# Patient Record
Sex: MALE | Race: WHITE | Employment: OTHER | URBAN - METROPOLITAN AREA
[De-identification: names, ages, dates, MRNs, and addresses within clinical notes are randomized per-mention and may not be internally consistent; named-entity substitution may affect disease eponyms.]

---

## 2017-01-18 ENCOUNTER — OFFICE VISIT (OUTPATIENT)
Dept: BARIATRICS/WEIGHT MGMT | Age: 47
End: 2017-01-18

## 2017-01-18 VITALS
HEIGHT: 66 IN | SYSTOLIC BLOOD PRESSURE: 139 MMHG | DIASTOLIC BLOOD PRESSURE: 79 MMHG | HEART RATE: 118 BPM | OXYGEN SATURATION: 97 % | BODY MASS INDEX: 32.14 KG/M2 | WEIGHT: 199.96 LBS

## 2017-01-18 DIAGNOSIS — L72.3 INFECTED SEBACEOUS CYST OF SKIN: ICD-10-CM

## 2017-01-18 DIAGNOSIS — Z89.511 STATUS POST BELOW KNEE AMPUTATION OF RIGHT LOWER EXTREMITY (HCC): Primary | ICD-10-CM

## 2017-01-18 DIAGNOSIS — L08.9 INFECTED SEBACEOUS CYST OF SKIN: ICD-10-CM

## 2017-01-18 PROCEDURE — 99213 OFFICE O/P EST LOW 20 MIN: CPT | Performed by: SURGERY

## 2017-01-18 RX ORDER — SULFAMETHOXAZOLE AND TRIMETHOPRIM 800; 160 MG/1; MG/1
1 TABLET ORAL 2 TIMES DAILY
Qty: 14 TABLET | Refills: 0 | Status: SHIPPED | OUTPATIENT
Start: 2017-01-18 | End: 2017-01-25

## 2017-01-18 RX ORDER — IBUPROFEN 200 MG
TABLET ORAL
Qty: 1 TUBE | Refills: 2 | Status: ON HOLD | OUTPATIENT
Start: 2017-01-18 | End: 2017-07-22 | Stop reason: ALTCHOICE

## 2017-01-24 ASSESSMENT — ENCOUNTER SYMPTOMS
WHEEZING: 0
TROUBLE SWALLOWING: 0
DIARRHEA: 0
SHORTNESS OF BREATH: 0
ABDOMINAL PAIN: 0
COLOR CHANGE: 0
BLOOD IN STOOL: 0
CONSTIPATION: 0
NAUSEA: 0
SORE THROAT: 0
PHOTOPHOBIA: 0
ANAL BLEEDING: 0
VOICE CHANGE: 0
COUGH: 0
VOMITING: 0

## 2017-02-01 ENCOUNTER — OFFICE VISIT (OUTPATIENT)
Dept: BARIATRICS/WEIGHT MGMT | Age: 47
End: 2017-02-01

## 2017-02-01 VITALS — RESPIRATION RATE: 16 BRPM | SYSTOLIC BLOOD PRESSURE: 118 MMHG | DIASTOLIC BLOOD PRESSURE: 72 MMHG | HEART RATE: 68 BPM

## 2017-02-01 DIAGNOSIS — Z89.511 STATUS POST BELOW KNEE AMPUTATION OF RIGHT LOWER EXTREMITY (HCC): Primary | ICD-10-CM

## 2017-02-01 DIAGNOSIS — L72.3 SEBACEOUS CYST: ICD-10-CM

## 2017-02-01 PROCEDURE — 99214 OFFICE O/P EST MOD 30 MIN: CPT | Performed by: SURGERY

## 2017-02-06 ENCOUNTER — TELEPHONE (OUTPATIENT)
Dept: BARIATRICS/WEIGHT MGMT | Age: 47
End: 2017-02-06

## 2017-02-06 ASSESSMENT — ENCOUNTER SYMPTOMS
COLOR CHANGE: 0
WHEEZING: 0
VOMITING: 0
SORE THROAT: 0
CONSTIPATION: 0
ABDOMINAL PAIN: 0
ANAL BLEEDING: 0
DIARRHEA: 0
VOICE CHANGE: 0
COUGH: 0
SHORTNESS OF BREATH: 0
TROUBLE SWALLOWING: 0
BLOOD IN STOOL: 0
PHOTOPHOBIA: 0
NAUSEA: 0

## 2017-02-22 ENCOUNTER — OFFICE VISIT (OUTPATIENT)
Dept: BARIATRICS/WEIGHT MGMT | Age: 47
End: 2017-02-22

## 2017-02-22 VITALS
DIASTOLIC BLOOD PRESSURE: 66 MMHG | SYSTOLIC BLOOD PRESSURE: 108 MMHG | BODY MASS INDEX: 35.36 KG/M2 | HEART RATE: 90 BPM | WEIGHT: 220.02 LBS | HEIGHT: 66 IN

## 2017-02-22 DIAGNOSIS — L72.3 INFECTED SEBACEOUS CYST: Primary | ICD-10-CM

## 2017-02-22 DIAGNOSIS — L08.9 INFECTED SEBACEOUS CYST: Primary | ICD-10-CM

## 2017-02-22 PROCEDURE — 99213 OFFICE O/P EST LOW 20 MIN: CPT | Performed by: SURGERY

## 2017-02-23 ENCOUNTER — OFFICE VISIT (OUTPATIENT)
Dept: BARIATRICS/WEIGHT MGMT | Age: 47
End: 2017-02-23

## 2017-02-23 VITALS
TEMPERATURE: 98.5 F | DIASTOLIC BLOOD PRESSURE: 76 MMHG | SYSTOLIC BLOOD PRESSURE: 137 MMHG | HEART RATE: 109 BPM | BODY MASS INDEX: 35.36 KG/M2 | RESPIRATION RATE: 16 BRPM | WEIGHT: 220 LBS

## 2017-02-23 DIAGNOSIS — L03.811 CELLULITIS OF HEAD EXCEPT FACE: Primary | ICD-10-CM

## 2017-02-23 DIAGNOSIS — L72.3 SEBACEOUS CYST: ICD-10-CM

## 2017-02-23 PROCEDURE — 99024 POSTOP FOLLOW-UP VISIT: CPT | Performed by: SURGERY

## 2017-02-23 RX ORDER — DOXYCYCLINE HYCLATE 100 MG/1
100 CAPSULE ORAL 2 TIMES DAILY
Qty: 20 CAPSULE | Refills: 0 | Status: SHIPPED | OUTPATIENT
Start: 2017-02-23 | End: 2017-03-01 | Stop reason: SDUPTHER

## 2017-03-01 ENCOUNTER — OFFICE VISIT (OUTPATIENT)
Dept: BARIATRICS/WEIGHT MGMT | Age: 47
End: 2017-03-01

## 2017-03-01 VITALS
HEART RATE: 90 BPM | DIASTOLIC BLOOD PRESSURE: 78 MMHG | HEIGHT: 66 IN | BODY MASS INDEX: 35.36 KG/M2 | WEIGHT: 220.02 LBS | SYSTOLIC BLOOD PRESSURE: 130 MMHG

## 2017-03-01 DIAGNOSIS — L72.3 SEBACEOUS CYST: Primary | ICD-10-CM

## 2017-03-01 DIAGNOSIS — L03.811 CELLULITIS OF HEAD EXCEPT FACE: ICD-10-CM

## 2017-03-01 PROCEDURE — 99213 OFFICE O/P EST LOW 20 MIN: CPT | Performed by: SURGERY

## 2017-03-01 RX ORDER — DOXYCYCLINE HYCLATE 100 MG/1
100 CAPSULE ORAL 2 TIMES DAILY
Qty: 20 CAPSULE | Refills: 0 | Status: SHIPPED | OUTPATIENT
Start: 2017-03-01 | End: 2017-03-11

## 2017-03-09 ENCOUNTER — HOSPITAL ENCOUNTER (OUTPATIENT)
Dept: PHYSICAL THERAPY | Age: 47
Discharge: HOME | End: 2017-03-31
Attending: SURGERY | Admitting: SURGERY

## 2017-03-09 DIAGNOSIS — Z89.511 STATUS POST BELOW KNEE AMPUTATION OF RIGHT LOWER EXTREMITY (HCC): ICD-10-CM

## 2017-03-09 DIAGNOSIS — R26.9 GAIT DISTURBANCE: Primary | ICD-10-CM

## 2017-03-13 ENCOUNTER — TELEPHONE (OUTPATIENT)
Dept: BARIATRICS/WEIGHT MGMT | Age: 47
End: 2017-03-13

## 2017-03-15 ENCOUNTER — OFFICE VISIT (OUTPATIENT)
Dept: BARIATRICS/WEIGHT MGMT | Age: 47
End: 2017-03-15

## 2017-03-15 VITALS
SYSTOLIC BLOOD PRESSURE: 124 MMHG | HEART RATE: 80 BPM | BODY MASS INDEX: 35.36 KG/M2 | HEIGHT: 66 IN | WEIGHT: 220.02 LBS | DIASTOLIC BLOOD PRESSURE: 76 MMHG

## 2017-03-15 DIAGNOSIS — L72.3 SEBACEOUS CYST: ICD-10-CM

## 2017-03-15 DIAGNOSIS — E11.42 TYPE 2 DIABETES MELLITUS WITH DIABETIC POLYNEUROPATHY, WITH LONG-TERM CURRENT USE OF INSULIN (HCC): Primary | ICD-10-CM

## 2017-03-15 DIAGNOSIS — Z89.511 STATUS POST BELOW KNEE AMPUTATION OF RIGHT LOWER EXTREMITY (HCC): ICD-10-CM

## 2017-03-15 DIAGNOSIS — L97.519 DIABETIC ULCER OF RIGHT FOOT ASSOCIATED WITH TYPE 2 DIABETES MELLITUS (HCC): ICD-10-CM

## 2017-03-15 DIAGNOSIS — E11.621 DIABETIC ULCER OF RIGHT FOOT ASSOCIATED WITH TYPE 2 DIABETES MELLITUS (HCC): ICD-10-CM

## 2017-03-15 DIAGNOSIS — Z79.4 TYPE 2 DIABETES MELLITUS WITH DIABETIC POLYNEUROPATHY, WITH LONG-TERM CURRENT USE OF INSULIN (HCC): Primary | ICD-10-CM

## 2017-03-15 PROCEDURE — 99213 OFFICE O/P EST LOW 20 MIN: CPT | Performed by: SURGERY

## 2017-04-01 ENCOUNTER — HOSPITAL ENCOUNTER (OUTPATIENT)
Dept: OTHER | Age: 47
Discharge: HOME | End: 2017-04-30
Attending: SURGERY | Admitting: SURGERY

## 2017-07-22 ENCOUNTER — HOSPITAL ENCOUNTER (INPATIENT)
Age: 47
LOS: 16 days | Discharge: HOME | DRG: 885 | End: 2017-08-07
Attending: PSYCHIATRY & NEUROLOGY | Admitting: PSYCHIATRY & NEUROLOGY
Payer: MEDICARE

## 2017-07-22 LAB — GLUCOSE BLD-MCNC: 286 MG/DL (ref 70–108)

## 2017-07-22 PROCEDURE — 6370000000 HC RX 637 (ALT 250 FOR IP): Performed by: PSYCHIATRY & NEUROLOGY

## 2017-07-22 PROCEDURE — 82948 REAGENT STRIP/BLOOD GLUCOSE: CPT

## 2017-07-22 PROCEDURE — 6370000000 HC RX 637 (ALT 250 FOR IP)

## 2017-07-22 PROCEDURE — 1240000000 HC EMOTIONAL WELLNESS R&B

## 2017-07-22 RX ORDER — SERTRALINE HYDROCHLORIDE 100 MG/1
100 TABLET, FILM COATED ORAL DAILY
Status: DISCONTINUED | OUTPATIENT
Start: 2017-07-23 | End: 2017-08-04

## 2017-07-22 RX ORDER — HYDROXYZINE PAMOATE 25 MG/1
25 CAPSULE ORAL 3 TIMES DAILY PRN
Status: DISCONTINUED | OUTPATIENT
Start: 2017-07-22 | End: 2017-08-07 | Stop reason: HOSPADM

## 2017-07-22 RX ORDER — DOXYCYCLINE HYCLATE 100 MG
100 TABLET ORAL EVERY 12 HOURS SCHEDULED
Status: COMPLETED | OUTPATIENT
Start: 2017-07-22 | End: 2017-07-27

## 2017-07-22 RX ORDER — TRAZODONE HYDROCHLORIDE 50 MG/1
TABLET ORAL
Status: COMPLETED
Start: 2017-07-22 | End: 2017-07-22

## 2017-07-22 RX ORDER — METHOCARBAMOL 500 MG/1
750 TABLET, FILM COATED ORAL 4 TIMES DAILY
Status: DISCONTINUED | OUTPATIENT
Start: 2017-07-22 | End: 2017-08-07 | Stop reason: HOSPADM

## 2017-07-22 RX ORDER — DEXTROSE MONOHYDRATE 50 MG/ML
100 INJECTION, SOLUTION INTRAVENOUS PRN
Status: DISCONTINUED | OUTPATIENT
Start: 2017-07-22 | End: 2017-08-07 | Stop reason: HOSPADM

## 2017-07-22 RX ORDER — ACETAMINOPHEN 325 MG/1
650 TABLET ORAL EVERY 4 HOURS PRN
Status: DISCONTINUED | OUTPATIENT
Start: 2017-07-22 | End: 2017-08-07 | Stop reason: HOSPADM

## 2017-07-22 RX ORDER — MAGNESIUM HYDROXIDE/ALUMINUM HYDROXICE/SIMETHICONE 120; 1200; 1200 MG/30ML; MG/30ML; MG/30ML
30 SUSPENSION ORAL PRN
Status: DISCONTINUED | OUTPATIENT
Start: 2017-07-22 | End: 2017-08-07 | Stop reason: HOSPADM

## 2017-07-22 RX ORDER — NICOTINE POLACRILEX 4 MG
15 LOZENGE BUCCAL PRN
Status: DISCONTINUED | OUTPATIENT
Start: 2017-07-22 | End: 2017-08-07 | Stop reason: HOSPADM

## 2017-07-22 RX ORDER — DEXTROSE MONOHYDRATE 25 G/50ML
12.5 INJECTION, SOLUTION INTRAVENOUS PRN
Status: DISCONTINUED | OUTPATIENT
Start: 2017-07-22 | End: 2017-08-07 | Stop reason: HOSPADM

## 2017-07-22 RX ORDER — OXYCODONE HYDROCHLORIDE AND ACETAMINOPHEN 5; 325 MG/1; MG/1
1 TABLET ORAL EVERY 4 HOURS PRN
Status: ON HOLD | COMMUNITY
End: 2017-08-18 | Stop reason: HOSPADM

## 2017-07-22 RX ORDER — TRAZODONE HYDROCHLORIDE 50 MG/1
50 TABLET ORAL ONCE
Status: COMPLETED | OUTPATIENT
Start: 2017-07-23 | End: 2017-07-22

## 2017-07-22 RX ORDER — METHOCARBAMOL 750 MG/1
750 TABLET, FILM COATED ORAL 4 TIMES DAILY
Status: ON HOLD | COMMUNITY
End: 2017-09-01 | Stop reason: HOSPADM

## 2017-07-22 RX ORDER — TRAZODONE HYDROCHLORIDE 50 MG/1
50 TABLET ORAL NIGHTLY PRN
Status: DISCONTINUED | OUTPATIENT
Start: 2017-07-23 | End: 2017-07-31

## 2017-07-22 RX ORDER — OXYCODONE HYDROCHLORIDE AND ACETAMINOPHEN 5; 325 MG/1; MG/1
1 TABLET ORAL EVERY 4 HOURS PRN
Status: DISCONTINUED | OUTPATIENT
Start: 2017-07-22 | End: 2017-07-22 | Stop reason: SDUPTHER

## 2017-07-22 RX ORDER — ARIPIPRAZOLE 5 MG/1
5 TABLET ORAL DAILY
Status: DISCONTINUED | OUTPATIENT
Start: 2017-07-23 | End: 2017-07-26

## 2017-07-22 RX ORDER — OXYCODONE HYDROCHLORIDE AND ACETAMINOPHEN 5; 325 MG/1; MG/1
2 TABLET ORAL EVERY 4 HOURS PRN
Status: DISCONTINUED | OUTPATIENT
Start: 2017-07-22 | End: 2017-08-07 | Stop reason: HOSPADM

## 2017-07-22 RX ORDER — INSULIN GLARGINE 100 [IU]/ML
15 INJECTION, SOLUTION SUBCUTANEOUS NIGHTLY
Status: DISCONTINUED | OUTPATIENT
Start: 2017-07-22 | End: 2017-08-07 | Stop reason: HOSPADM

## 2017-07-22 RX ORDER — OXYCODONE HYDROCHLORIDE AND ACETAMINOPHEN 5; 325 MG/1; MG/1
1 TABLET ORAL EVERY 4 HOURS PRN
Status: DISCONTINUED | OUTPATIENT
Start: 2017-07-22 | End: 2017-08-07 | Stop reason: HOSPADM

## 2017-07-22 RX ADMIN — OXYCODONE HYDROCHLORIDE AND ACETAMINOPHEN 2 TABLET: 5; 325 TABLET ORAL at 22:38

## 2017-07-22 RX ADMIN — TRAZODONE HYDROCHLORIDE 50 MG: 50 TABLET ORAL at 23:45

## 2017-07-22 RX ADMIN — METHOCARBAMOL 750 MG: 500 TABLET ORAL at 22:35

## 2017-07-22 RX ADMIN — INSULIN GLARGINE 15 UNITS: 100 INJECTION, SOLUTION SUBCUTANEOUS at 22:33

## 2017-07-22 RX ADMIN — DOXYCYCLINE HYCLATE 100 MG: 100 TABLET, COATED ORAL at 22:36

## 2017-07-22 RX ADMIN — Medication 2 UNITS: at 22:33

## 2017-07-22 ASSESSMENT — SLEEP AND FATIGUE QUESTIONNAIRES
AVERAGE NUMBER OF SLEEP HOURS: 4
SLEEP PATTERN: DIFFICULTY FALLING ASLEEP;DISTURBED/INTERRUPTED SLEEP
DO YOU USE A SLEEP AID: NO
RESTFUL SLEEP: NO
DIFFICULTY FALLING ASLEEP: YES
DO YOU HAVE DIFFICULTY SLEEPING: YES
DIFFICULTY ARISING: YES
DIFFICULTY STAYING ASLEEP: YES

## 2017-07-22 ASSESSMENT — PAIN DESCRIPTION - DESCRIPTORS: DESCRIPTORS: ACHING;CONSTANT;DISCOMFORT

## 2017-07-22 ASSESSMENT — PAIN DESCRIPTION - FREQUENCY: FREQUENCY: CONTINUOUS

## 2017-07-22 ASSESSMENT — PATIENT HEALTH QUESTIONNAIRE - PHQ9: SUM OF ALL RESPONSES TO PHQ QUESTIONS 1-9: 18

## 2017-07-22 ASSESSMENT — PAIN SCALES - GENERAL
PAINLEVEL_OUTOF10: 10
PAINLEVEL_OUTOF10: 5

## 2017-07-22 ASSESSMENT — PAIN DESCRIPTION - LOCATION: LOCATION: SHOULDER

## 2017-07-22 ASSESSMENT — PAIN DESCRIPTION - ORIENTATION: ORIENTATION: LEFT

## 2017-07-22 ASSESSMENT — PAIN DESCRIPTION - ONSET: ONSET: ON-GOING

## 2017-07-22 ASSESSMENT — PAIN DESCRIPTION - PAIN TYPE: TYPE: ACUTE PAIN

## 2017-07-22 ASSESSMENT — PAIN DESCRIPTION - PROGRESSION: CLINICAL_PROGRESSION: NOT CHANGED

## 2017-07-22 ASSESSMENT — LIFESTYLE VARIABLES: HISTORY_ALCOHOL_USE: NO

## 2017-07-23 LAB
GLUCOSE BLD-MCNC: 158 MG/DL (ref 70–108)
GLUCOSE BLD-MCNC: 205 MG/DL (ref 70–108)
GLUCOSE BLD-MCNC: 276 MG/DL (ref 70–108)
GLUCOSE BLD-MCNC: 284 MG/DL (ref 70–108)

## 2017-07-23 PROCEDURE — 82948 REAGENT STRIP/BLOOD GLUCOSE: CPT

## 2017-07-23 PROCEDURE — 6370000000 HC RX 637 (ALT 250 FOR IP): Performed by: PSYCHIATRY & NEUROLOGY

## 2017-07-23 PROCEDURE — 1240000000 HC EMOTIONAL WELLNESS R&B

## 2017-07-23 PROCEDURE — 90792 PSYCH DIAG EVAL W/MED SRVCS: CPT | Performed by: PSYCHIATRY & NEUROLOGY

## 2017-07-23 RX ADMIN — METHOCARBAMOL 750 MG: 500 TABLET ORAL at 17:12

## 2017-07-23 RX ADMIN — INSULIN GLARGINE 15 UNITS: 100 INJECTION, SOLUTION SUBCUTANEOUS at 21:15

## 2017-07-23 RX ADMIN — METHOCARBAMOL 750 MG: 500 TABLET ORAL at 08:22

## 2017-07-23 RX ADMIN — OXYCODONE HYDROCHLORIDE AND ACETAMINOPHEN 2 TABLET: 5; 325 TABLET ORAL at 21:24

## 2017-07-23 RX ADMIN — SERTRALINE 100 MG: 100 TABLET, FILM COATED ORAL at 08:23

## 2017-07-23 RX ADMIN — INSULIN LISPRO 3 UNITS: 100 INJECTION, SOLUTION INTRAVENOUS; SUBCUTANEOUS at 17:09

## 2017-07-23 RX ADMIN — METHOCARBAMOL 750 MG: 500 TABLET ORAL at 21:13

## 2017-07-23 RX ADMIN — OXYCODONE HYDROCHLORIDE AND ACETAMINOPHEN 2 TABLET: 5; 325 TABLET ORAL at 08:29

## 2017-07-23 RX ADMIN — INSULIN LISPRO 1 UNITS: 100 INJECTION, SOLUTION INTRAVENOUS; SUBCUTANEOUS at 08:20

## 2017-07-23 RX ADMIN — DOXYCYCLINE HYCLATE 100 MG: 100 TABLET, COATED ORAL at 21:14

## 2017-07-23 RX ADMIN — ARIPIPRAZOLE 5 MG: 5 TABLET ORAL at 08:23

## 2017-07-23 RX ADMIN — INSULIN LISPRO 2 UNITS: 100 INJECTION, SOLUTION INTRAVENOUS; SUBCUTANEOUS at 12:21

## 2017-07-23 RX ADMIN — METHOCARBAMOL 750 MG: 500 TABLET ORAL at 12:23

## 2017-07-23 RX ADMIN — DOXYCYCLINE HYCLATE 100 MG: 100 TABLET, COATED ORAL at 08:23

## 2017-07-23 RX ADMIN — Medication 2 UNITS: at 21:15

## 2017-07-23 ASSESSMENT — PAIN DESCRIPTION - LOCATION: LOCATION: SHOULDER

## 2017-07-23 ASSESSMENT — PAIN SCALES - GENERAL
PAINLEVEL_OUTOF10: 9
PAINLEVEL_OUTOF10: 3
PAINLEVEL_OUTOF10: 10
PAINLEVEL_OUTOF10: 9

## 2017-07-23 ASSESSMENT — SLEEP AND FATIGUE QUESTIONNAIRES
AVERAGE NUMBER OF SLEEP HOURS: 4
DIFFICULTY STAYING ASLEEP: YES
DO YOU HAVE DIFFICULTY SLEEPING: YES
DO YOU USE A SLEEP AID: NO
RESTFUL SLEEP: NO
DIFFICULTY FALLING ASLEEP: YES
SLEEP PATTERN: DIFFICULTY FALLING ASLEEP;DISTURBED/INTERRUPTED SLEEP
DIFFICULTY ARISING: YES

## 2017-07-23 ASSESSMENT — PAIN DESCRIPTION - DESCRIPTORS: DESCRIPTORS: ACHING;CONSTANT;DISCOMFORT

## 2017-07-23 ASSESSMENT — PAIN DESCRIPTION - FREQUENCY: FREQUENCY: CONTINUOUS

## 2017-07-23 ASSESSMENT — PAIN DESCRIPTION - ORIENTATION: ORIENTATION: LEFT

## 2017-07-23 ASSESSMENT — PAIN DESCRIPTION - PAIN TYPE: TYPE: ACUTE PAIN

## 2017-07-24 LAB
GLUCOSE BLD-MCNC: 205 MG/DL (ref 70–108)
GLUCOSE BLD-MCNC: 271 MG/DL (ref 70–108)
GLUCOSE BLD-MCNC: 305 MG/DL (ref 70–108)
GLUCOSE BLD-MCNC: 327 MG/DL (ref 70–108)

## 2017-07-24 PROCEDURE — 99231 SBSQ HOSP IP/OBS SF/LOW 25: CPT | Performed by: PHYSICIAN ASSISTANT

## 2017-07-24 PROCEDURE — 82948 REAGENT STRIP/BLOOD GLUCOSE: CPT

## 2017-07-24 PROCEDURE — 6370000000 HC RX 637 (ALT 250 FOR IP): Performed by: PSYCHIATRY & NEUROLOGY

## 2017-07-24 PROCEDURE — 1240000000 HC EMOTIONAL WELLNESS R&B

## 2017-07-24 RX ADMIN — METHOCARBAMOL 750 MG: 500 TABLET ORAL at 18:18

## 2017-07-24 RX ADMIN — SERTRALINE 100 MG: 100 TABLET, FILM COATED ORAL at 08:57

## 2017-07-24 RX ADMIN — METHOCARBAMOL 750 MG: 500 TABLET ORAL at 20:23

## 2017-07-24 RX ADMIN — METHOCARBAMOL 750 MG: 500 TABLET ORAL at 08:57

## 2017-07-24 RX ADMIN — METHOCARBAMOL 750 MG: 500 TABLET ORAL at 12:42

## 2017-07-24 RX ADMIN — INSULIN LISPRO 3 UNITS: 100 INJECTION, SOLUTION INTRAVENOUS; SUBCUTANEOUS at 18:10

## 2017-07-24 RX ADMIN — OXYCODONE HYDROCHLORIDE AND ACETAMINOPHEN 2 TABLET: 5; 325 TABLET ORAL at 14:39

## 2017-07-24 RX ADMIN — INSULIN LISPRO 2 UNITS: 100 INJECTION, SOLUTION INTRAVENOUS; SUBCUTANEOUS at 08:57

## 2017-07-24 RX ADMIN — DOXYCYCLINE HYCLATE 100 MG: 100 TABLET, COATED ORAL at 20:26

## 2017-07-24 RX ADMIN — Medication 2 UNITS: at 20:27

## 2017-07-24 RX ADMIN — ARIPIPRAZOLE 5 MG: 5 TABLET ORAL at 08:57

## 2017-07-24 RX ADMIN — DOXYCYCLINE HYCLATE 100 MG: 100 TABLET, COATED ORAL at 08:57

## 2017-07-24 RX ADMIN — OXYCODONE HYDROCHLORIDE AND ACETAMINOPHEN 2 TABLET: 5; 325 TABLET ORAL at 09:01

## 2017-07-24 RX ADMIN — INSULIN LISPRO 4 UNITS: 100 INJECTION, SOLUTION INTRAVENOUS; SUBCUTANEOUS at 12:42

## 2017-07-24 RX ADMIN — OXYCODONE HYDROCHLORIDE AND ACETAMINOPHEN 2 TABLET: 5; 325 TABLET ORAL at 20:32

## 2017-07-24 RX ADMIN — TRAZODONE HYDROCHLORIDE 50 MG: 50 TABLET ORAL at 20:32

## 2017-07-24 RX ADMIN — INSULIN GLARGINE 15 UNITS: 100 INJECTION, SOLUTION SUBCUTANEOUS at 20:26

## 2017-07-24 ASSESSMENT — PAIN DESCRIPTION - ORIENTATION: ORIENTATION: LEFT

## 2017-07-24 ASSESSMENT — PAIN SCALES - GENERAL
PAINLEVEL_OUTOF10: 9
PAINLEVEL_OUTOF10: 7
PAINLEVEL_OUTOF10: 7
PAINLEVEL_OUTOF10: 6
PAINLEVEL_OUTOF10: 7

## 2017-07-24 ASSESSMENT — PAIN DESCRIPTION - PAIN TYPE: TYPE: SURGICAL PAIN

## 2017-07-24 ASSESSMENT — PAIN DESCRIPTION - LOCATION: LOCATION: SHOULDER

## 2017-07-25 LAB
GLUCOSE BLD-MCNC: 207 MG/DL (ref 70–108)
GLUCOSE BLD-MCNC: 246 MG/DL (ref 70–108)
GLUCOSE BLD-MCNC: 334 MG/DL (ref 70–108)
GLUCOSE BLD-MCNC: 345 MG/DL (ref 70–108)

## 2017-07-25 PROCEDURE — 1240000000 HC EMOTIONAL WELLNESS R&B

## 2017-07-25 PROCEDURE — 6370000000 HC RX 637 (ALT 250 FOR IP): Performed by: PSYCHIATRY & NEUROLOGY

## 2017-07-25 PROCEDURE — 82948 REAGENT STRIP/BLOOD GLUCOSE: CPT

## 2017-07-25 PROCEDURE — 99231 SBSQ HOSP IP/OBS SF/LOW 25: CPT | Performed by: NURSE PRACTITIONER

## 2017-07-25 RX ADMIN — SERTRALINE 100 MG: 100 TABLET, FILM COATED ORAL at 08:42

## 2017-07-25 RX ADMIN — METHOCARBAMOL 750 MG: 500 TABLET ORAL at 17:11

## 2017-07-25 RX ADMIN — OXYCODONE HYDROCHLORIDE AND ACETAMINOPHEN 2 TABLET: 5; 325 TABLET ORAL at 08:45

## 2017-07-25 RX ADMIN — METHOCARBAMOL 750 MG: 500 TABLET ORAL at 22:00

## 2017-07-25 RX ADMIN — INSULIN GLARGINE 15 UNITS: 100 INJECTION, SOLUTION SUBCUTANEOUS at 21:59

## 2017-07-25 RX ADMIN — INSULIN LISPRO 2 UNITS: 100 INJECTION, SOLUTION INTRAVENOUS; SUBCUTANEOUS at 08:00

## 2017-07-25 RX ADMIN — INSULIN LISPRO 2 UNITS: 100 INJECTION, SOLUTION INTRAVENOUS; SUBCUTANEOUS at 12:10

## 2017-07-25 RX ADMIN — OXYCODONE HYDROCHLORIDE AND ACETAMINOPHEN 2 TABLET: 5; 325 TABLET ORAL at 22:04

## 2017-07-25 RX ADMIN — ARIPIPRAZOLE 5 MG: 5 TABLET ORAL at 08:42

## 2017-07-25 RX ADMIN — Medication 2 UNITS: at 21:58

## 2017-07-25 RX ADMIN — DOXYCYCLINE HYCLATE 100 MG: 100 TABLET, COATED ORAL at 08:42

## 2017-07-25 RX ADMIN — TRAZODONE HYDROCHLORIDE 50 MG: 50 TABLET ORAL at 22:01

## 2017-07-25 RX ADMIN — METHOCARBAMOL 750 MG: 500 TABLET ORAL at 12:12

## 2017-07-25 RX ADMIN — INSULIN LISPRO 4 UNITS: 100 INJECTION, SOLUTION INTRAVENOUS; SUBCUTANEOUS at 17:11

## 2017-07-25 RX ADMIN — METHOCARBAMOL 750 MG: 500 TABLET ORAL at 08:42

## 2017-07-25 RX ADMIN — DOXYCYCLINE HYCLATE 100 MG: 100 TABLET, COATED ORAL at 22:00

## 2017-07-25 ASSESSMENT — PAIN SCALES - GENERAL
PAINLEVEL_OUTOF10: 9
PAINLEVEL_OUTOF10: 3
PAINLEVEL_OUTOF10: 9
PAINLEVEL_OUTOF10: 9

## 2017-07-25 ASSESSMENT — PAIN DESCRIPTION - PAIN TYPE: TYPE: ACUTE PAIN

## 2017-07-25 ASSESSMENT — PAIN DESCRIPTION - FREQUENCY: FREQUENCY: CONTINUOUS

## 2017-07-25 ASSESSMENT — PAIN DESCRIPTION - DESCRIPTORS: DESCRIPTORS: ACHING;DISCOMFORT

## 2017-07-25 ASSESSMENT — PAIN DESCRIPTION - ORIENTATION: ORIENTATION: LEFT

## 2017-07-25 ASSESSMENT — PAIN DESCRIPTION - LOCATION: LOCATION: SHOULDER

## 2017-07-25 ASSESSMENT — PAIN DESCRIPTION - PROGRESSION: CLINICAL_PROGRESSION: NOT CHANGED

## 2017-07-25 ASSESSMENT — PAIN DESCRIPTION - ONSET: ONSET: ON-GOING

## 2017-07-26 PROBLEM — E11.65 UNCONTROLLED TYPE 2 DIABETES MELLITUS WITH HYPERGLYCEMIA, WITH LONG-TERM CURRENT USE OF INSULIN (HCC): Chronic | Status: ACTIVE | Noted: 2017-07-26

## 2017-07-26 PROBLEM — Z79.4 UNCONTROLLED TYPE 2 DIABETES MELLITUS WITH HYPERGLYCEMIA, WITH LONG-TERM CURRENT USE OF INSULIN (HCC): Chronic | Status: ACTIVE | Noted: 2017-07-26

## 2017-07-26 LAB
GLUCOSE BLD-MCNC: 222 MG/DL (ref 70–108)
GLUCOSE BLD-MCNC: 288 MG/DL (ref 70–108)
GLUCOSE BLD-MCNC: 295 MG/DL (ref 70–108)
GLUCOSE BLD-MCNC: 451 MG/DL (ref 70–108)

## 2017-07-26 PROCEDURE — 82948 REAGENT STRIP/BLOOD GLUCOSE: CPT

## 2017-07-26 PROCEDURE — 6370000000 HC RX 637 (ALT 250 FOR IP): Performed by: PHYSICIAN ASSISTANT

## 2017-07-26 PROCEDURE — 6370000000 HC RX 637 (ALT 250 FOR IP): Performed by: NURSE PRACTITIONER

## 2017-07-26 PROCEDURE — 1240000000 HC EMOTIONAL WELLNESS R&B

## 2017-07-26 PROCEDURE — 6370000000 HC RX 637 (ALT 250 FOR IP): Performed by: PSYCHIATRY & NEUROLOGY

## 2017-07-26 PROCEDURE — 99253 IP/OBS CNSLTJ NEW/EST LOW 45: CPT | Performed by: NURSE PRACTITIONER

## 2017-07-26 PROCEDURE — 99231 SBSQ HOSP IP/OBS SF/LOW 25: CPT | Performed by: PHYSICIAN ASSISTANT

## 2017-07-26 RX ORDER — ARIPIPRAZOLE 5 MG/1
5 TABLET ORAL ONCE
Status: COMPLETED | OUTPATIENT
Start: 2017-07-26 | End: 2017-07-26

## 2017-07-26 RX ORDER — ARIPIPRAZOLE 10 MG/1
10 TABLET ORAL DAILY
Status: DISCONTINUED | OUTPATIENT
Start: 2017-07-27 | End: 2017-08-07 | Stop reason: HOSPADM

## 2017-07-26 RX ADMIN — DOXYCYCLINE HYCLATE 100 MG: 100 TABLET, COATED ORAL at 21:18

## 2017-07-26 RX ADMIN — METHOCARBAMOL 750 MG: 500 TABLET ORAL at 12:08

## 2017-07-26 RX ADMIN — INSULIN GLARGINE 15 UNITS: 100 INJECTION, SOLUTION SUBCUTANEOUS at 21:16

## 2017-07-26 RX ADMIN — OXYCODONE HYDROCHLORIDE AND ACETAMINOPHEN 2 TABLET: 5; 325 TABLET ORAL at 18:41

## 2017-07-26 RX ADMIN — METHOCARBAMOL 750 MG: 500 TABLET ORAL at 08:40

## 2017-07-26 RX ADMIN — TRAZODONE HYDROCHLORIDE 50 MG: 50 TABLET ORAL at 21:17

## 2017-07-26 RX ADMIN — INSULIN LISPRO 9 UNITS: 100 INJECTION, SOLUTION INTRAVENOUS; SUBCUTANEOUS at 17:58

## 2017-07-26 RX ADMIN — ARIPIPRAZOLE 5 MG: 5 TABLET ORAL at 08:40

## 2017-07-26 RX ADMIN — METHOCARBAMOL 750 MG: 500 TABLET ORAL at 18:11

## 2017-07-26 RX ADMIN — OXYCODONE HYDROCHLORIDE AND ACETAMINOPHEN 2 TABLET: 5; 325 TABLET ORAL at 08:40

## 2017-07-26 RX ADMIN — HYDROXYZINE PAMOATE 25 MG: 25 CAPSULE ORAL at 21:17

## 2017-07-26 RX ADMIN — INSULIN LISPRO 6 UNITS: 100 INJECTION, SOLUTION INTRAVENOUS; SUBCUTANEOUS at 12:07

## 2017-07-26 RX ADMIN — SERTRALINE 100 MG: 100 TABLET, FILM COATED ORAL at 08:40

## 2017-07-26 RX ADMIN — INSULIN LISPRO 5 UNITS: 100 INJECTION, SOLUTION INTRAVENOUS; SUBCUTANEOUS at 21:15

## 2017-07-26 RX ADMIN — HYDROXYZINE PAMOATE 25 MG: 25 CAPSULE ORAL at 08:40

## 2017-07-26 RX ADMIN — METHOCARBAMOL 750 MG: 500 TABLET ORAL at 21:17

## 2017-07-26 RX ADMIN — ARIPIPRAZOLE 5 MG: 5 TABLET ORAL at 12:07

## 2017-07-26 RX ADMIN — DOXYCYCLINE HYCLATE 100 MG: 100 TABLET, COATED ORAL at 08:40

## 2017-07-26 RX ADMIN — OXYCODONE HYDROCHLORIDE AND ACETAMINOPHEN 2 TABLET: 5; 325 TABLET ORAL at 21:19

## 2017-07-26 RX ADMIN — INSULIN LISPRO 2 UNITS: 100 INJECTION, SOLUTION INTRAVENOUS; SUBCUTANEOUS at 08:37

## 2017-07-26 ASSESSMENT — PAIN DESCRIPTION - DESCRIPTORS: DESCRIPTORS: ACHING;DISCOMFORT

## 2017-07-26 ASSESSMENT — PAIN DESCRIPTION - FREQUENCY: FREQUENCY: CONTINUOUS

## 2017-07-26 ASSESSMENT — PAIN DESCRIPTION - PROGRESSION: CLINICAL_PROGRESSION: NOT CHANGED

## 2017-07-26 ASSESSMENT — PAIN DESCRIPTION - ORIENTATION: ORIENTATION: LEFT

## 2017-07-26 ASSESSMENT — ACTIVITIES OF DAILY LIVING (ADL): EFFECT OF PAIN ON DAILY ACTIVITIES: DECREASED COMFORT

## 2017-07-26 ASSESSMENT — PAIN DESCRIPTION - ONSET: ONSET: ON-GOING

## 2017-07-26 ASSESSMENT — PAIN SCALES - GENERAL
PAINLEVEL_OUTOF10: 7
PAINLEVEL_OUTOF10: 0
PAINLEVEL_OUTOF10: 9
PAINLEVEL_OUTOF10: 9

## 2017-07-26 ASSESSMENT — PAIN DESCRIPTION - LOCATION: LOCATION: SHOULDER

## 2017-07-26 ASSESSMENT — PAIN DESCRIPTION - PAIN TYPE: TYPE: CHRONIC PAIN

## 2017-07-27 LAB
GLUCOSE BLD-MCNC: 172 MG/DL (ref 70–108)
GLUCOSE BLD-MCNC: 217 MG/DL (ref 70–108)
GLUCOSE BLD-MCNC: 258 MG/DL (ref 70–108)
GLUCOSE BLD-MCNC: 273 MG/DL (ref 70–108)

## 2017-07-27 PROCEDURE — 1240000000 HC EMOTIONAL WELLNESS R&B

## 2017-07-27 PROCEDURE — 99233 SBSQ HOSP IP/OBS HIGH 50: CPT | Performed by: NURSE PRACTITIONER

## 2017-07-27 PROCEDURE — 99232 SBSQ HOSP IP/OBS MODERATE 35: CPT | Performed by: PHYSICIAN ASSISTANT

## 2017-07-27 PROCEDURE — 6370000000 HC RX 637 (ALT 250 FOR IP): Performed by: NURSE PRACTITIONER

## 2017-07-27 PROCEDURE — 82948 REAGENT STRIP/BLOOD GLUCOSE: CPT

## 2017-07-27 PROCEDURE — 6370000000 HC RX 637 (ALT 250 FOR IP): Performed by: PHYSICIAN ASSISTANT

## 2017-07-27 PROCEDURE — 6370000000 HC RX 637 (ALT 250 FOR IP): Performed by: PSYCHIATRY & NEUROLOGY

## 2017-07-27 RX ORDER — LANCETS 30 GAUGE
EACH MISCELLANEOUS
Qty: 200 EACH | Refills: 2 | Status: ON HOLD | OUTPATIENT
Start: 2017-07-27 | End: 2018-03-29

## 2017-07-27 RX ORDER — INSULIN GLARGINE 100 [IU]/ML
15 INJECTION, SOLUTION SUBCUTANEOUS NIGHTLY
Qty: 1 VIAL | Refills: 3 | Status: SHIPPED | OUTPATIENT
Start: 2017-07-27 | End: 2018-03-24 | Stop reason: DRUGHIGH

## 2017-07-27 RX ORDER — BLOOD-GLUCOSE METER
1 KIT MISCELLANEOUS
Qty: 1 KIT | Refills: 0 | Status: SHIPPED | OUTPATIENT
Start: 2017-07-27 | End: 2018-08-23

## 2017-07-27 RX ADMIN — INSULIN LISPRO 6 UNITS: 100 INJECTION, SOLUTION INTRAVENOUS; SUBCUTANEOUS at 12:16

## 2017-07-27 RX ADMIN — METHOCARBAMOL 750 MG: 500 TABLET ORAL at 13:08

## 2017-07-27 RX ADMIN — Medication 6 UNITS: at 17:46

## 2017-07-27 RX ADMIN — ARIPIPRAZOLE 10 MG: 10 TABLET ORAL at 08:27

## 2017-07-27 RX ADMIN — INSULIN GLARGINE 15 UNITS: 100 INJECTION, SOLUTION SUBCUTANEOUS at 21:25

## 2017-07-27 RX ADMIN — DOXYCYCLINE HYCLATE 100 MG: 100 TABLET, COATED ORAL at 21:24

## 2017-07-27 RX ADMIN — INSULIN LISPRO 3 UNITS: 100 INJECTION, SOLUTION INTRAVENOUS; SUBCUTANEOUS at 21:25

## 2017-07-27 RX ADMIN — INSULIN LISPRO 3 UNITS: 100 INJECTION, SOLUTION INTRAVENOUS; SUBCUTANEOUS at 08:21

## 2017-07-27 RX ADMIN — METHOCARBAMOL 750 MG: 500 TABLET ORAL at 17:49

## 2017-07-27 RX ADMIN — METHOCARBAMOL 750 MG: 500 TABLET ORAL at 08:26

## 2017-07-27 RX ADMIN — SERTRALINE 100 MG: 100 TABLET, FILM COATED ORAL at 08:28

## 2017-07-27 RX ADMIN — METHOCARBAMOL 750 MG: 500 TABLET ORAL at 21:24

## 2017-07-27 RX ADMIN — INSULIN LISPRO 15 UNITS: 100 INJECTION, SOLUTION INTRAVENOUS; SUBCUTANEOUS at 17:49

## 2017-07-27 RX ADMIN — HYDROXYZINE PAMOATE 25 MG: 25 CAPSULE ORAL at 21:32

## 2017-07-27 RX ADMIN — TRAZODONE HYDROCHLORIDE 50 MG: 50 TABLET ORAL at 21:32

## 2017-07-27 RX ADMIN — OXYCODONE HYDROCHLORIDE AND ACETAMINOPHEN 2 TABLET: 5; 325 TABLET ORAL at 20:08

## 2017-07-27 RX ADMIN — DOXYCYCLINE HYCLATE 100 MG: 100 TABLET, COATED ORAL at 08:27

## 2017-07-27 ASSESSMENT — ACTIVITIES OF DAILY LIVING (ADL): EFFECT OF PAIN ON DAILY ACTIVITIES: DECREASED COMFORT

## 2017-07-27 ASSESSMENT — PAIN SCALES - GENERAL
PAINLEVEL_OUTOF10: 0
PAINLEVEL_OUTOF10: 3
PAINLEVEL_OUTOF10: 9
PAINLEVEL_OUTOF10: 9

## 2017-07-27 ASSESSMENT — PAIN DESCRIPTION - PROGRESSION: CLINICAL_PROGRESSION: NOT CHANGED

## 2017-07-27 ASSESSMENT — PAIN DESCRIPTION - ONSET: ONSET: ON-GOING

## 2017-07-27 ASSESSMENT — PAIN DESCRIPTION - LOCATION: LOCATION: SHOULDER

## 2017-07-27 ASSESSMENT — PAIN DESCRIPTION - FREQUENCY: FREQUENCY: CONTINUOUS

## 2017-07-27 ASSESSMENT — PAIN DESCRIPTION - DESCRIPTORS: DESCRIPTORS: ACHING;DISCOMFORT

## 2017-07-27 ASSESSMENT — PAIN DESCRIPTION - ORIENTATION: ORIENTATION: LEFT

## 2017-07-27 ASSESSMENT — PAIN DESCRIPTION - PAIN TYPE: TYPE: ACUTE PAIN

## 2017-07-28 LAB
GLUCOSE BLD-MCNC: 175 MG/DL (ref 70–108)
GLUCOSE BLD-MCNC: 201 MG/DL (ref 70–108)
GLUCOSE BLD-MCNC: 215 MG/DL (ref 70–108)
GLUCOSE BLD-MCNC: 222 MG/DL (ref 70–108)

## 2017-07-28 PROCEDURE — 6370000000 HC RX 637 (ALT 250 FOR IP): Performed by: PSYCHIATRY & NEUROLOGY

## 2017-07-28 PROCEDURE — 99231 SBSQ HOSP IP/OBS SF/LOW 25: CPT | Performed by: PSYCHIATRY & NEUROLOGY

## 2017-07-28 PROCEDURE — 6370000000 HC RX 637 (ALT 250 FOR IP): Performed by: PHYSICIAN ASSISTANT

## 2017-07-28 PROCEDURE — 82948 REAGENT STRIP/BLOOD GLUCOSE: CPT

## 2017-07-28 PROCEDURE — 1240000000 HC EMOTIONAL WELLNESS R&B

## 2017-07-28 RX ADMIN — METHOCARBAMOL 750 MG: 500 TABLET ORAL at 22:01

## 2017-07-28 RX ADMIN — INSULIN LISPRO 15 UNITS: 100 INJECTION, SOLUTION INTRAVENOUS; SUBCUTANEOUS at 17:30

## 2017-07-28 RX ADMIN — ARIPIPRAZOLE 10 MG: 10 TABLET ORAL at 09:16

## 2017-07-28 RX ADMIN — TRAZODONE HYDROCHLORIDE 50 MG: 50 TABLET ORAL at 22:01

## 2017-07-28 RX ADMIN — METHOCARBAMOL 750 MG: 500 TABLET ORAL at 17:30

## 2017-07-28 RX ADMIN — INSULIN LISPRO 2 UNITS: 100 INJECTION, SOLUTION INTRAVENOUS; SUBCUTANEOUS at 22:01

## 2017-07-28 RX ADMIN — INSULIN GLARGINE 15 UNITS: 100 INJECTION, SOLUTION SUBCUTANEOUS at 22:01

## 2017-07-28 RX ADMIN — INSULIN LISPRO 15 UNITS: 100 INJECTION, SOLUTION INTRAVENOUS; SUBCUTANEOUS at 12:22

## 2017-07-28 RX ADMIN — Medication 4 UNITS: at 12:20

## 2017-07-28 RX ADMIN — METHOCARBAMOL 750 MG: 500 TABLET ORAL at 09:16

## 2017-07-28 RX ADMIN — METHOCARBAMOL 750 MG: 500 TABLET ORAL at 12:22

## 2017-07-28 RX ADMIN — SERTRALINE 100 MG: 100 TABLET, FILM COATED ORAL at 09:16

## 2017-07-28 RX ADMIN — INSULIN LISPRO 15 UNITS: 100 INJECTION, SOLUTION INTRAVENOUS; SUBCUTANEOUS at 09:18

## 2017-07-28 RX ADMIN — Medication 2 UNITS: at 17:28

## 2017-07-28 RX ADMIN — Medication 4 UNITS: at 09:17

## 2017-07-28 ASSESSMENT — PAIN SCALES - GENERAL: PAINLEVEL_OUTOF10: 8

## 2017-07-29 LAB
GLUCOSE BLD-MCNC: 171 MG/DL (ref 70–108)
GLUCOSE BLD-MCNC: 232 MG/DL (ref 70–108)
GLUCOSE BLD-MCNC: 278 MG/DL (ref 70–108)
GLUCOSE BLD-MCNC: 287 MG/DL (ref 70–108)

## 2017-07-29 PROCEDURE — 99231 SBSQ HOSP IP/OBS SF/LOW 25: CPT | Performed by: NURSE PRACTITIONER

## 2017-07-29 PROCEDURE — 82948 REAGENT STRIP/BLOOD GLUCOSE: CPT

## 2017-07-29 PROCEDURE — 6370000000 HC RX 637 (ALT 250 FOR IP): Performed by: PSYCHIATRY & NEUROLOGY

## 2017-07-29 PROCEDURE — 6370000000 HC RX 637 (ALT 250 FOR IP): Performed by: PHYSICIAN ASSISTANT

## 2017-07-29 PROCEDURE — 1240000000 HC EMOTIONAL WELLNESS R&B

## 2017-07-29 RX ADMIN — METHOCARBAMOL 750 MG: 500 TABLET ORAL at 17:17

## 2017-07-29 RX ADMIN — INSULIN LISPRO 15 UNITS: 100 INJECTION, SOLUTION INTRAVENOUS; SUBCUTANEOUS at 12:12

## 2017-07-29 RX ADMIN — INSULIN LISPRO 15 UNITS: 100 INJECTION, SOLUTION INTRAVENOUS; SUBCUTANEOUS at 08:59

## 2017-07-29 RX ADMIN — Medication 2 UNITS: at 12:11

## 2017-07-29 RX ADMIN — METHOCARBAMOL 750 MG: 500 TABLET ORAL at 21:42

## 2017-07-29 RX ADMIN — OXYCODONE HYDROCHLORIDE AND ACETAMINOPHEN 2 TABLET: 5; 325 TABLET ORAL at 16:33

## 2017-07-29 RX ADMIN — TRAZODONE HYDROCHLORIDE 50 MG: 50 TABLET ORAL at 21:42

## 2017-07-29 RX ADMIN — SERTRALINE 100 MG: 100 TABLET, FILM COATED ORAL at 09:00

## 2017-07-29 RX ADMIN — Medication 6 UNITS: at 08:58

## 2017-07-29 RX ADMIN — ARIPIPRAZOLE 10 MG: 10 TABLET ORAL at 09:00

## 2017-07-29 RX ADMIN — INSULIN GLARGINE 15 UNITS: 100 INJECTION, SOLUTION SUBCUTANEOUS at 21:42

## 2017-07-29 RX ADMIN — HYDROXYZINE PAMOATE 25 MG: 25 CAPSULE ORAL at 21:42

## 2017-07-29 RX ADMIN — INSULIN LISPRO 15 UNITS: 100 INJECTION, SOLUTION INTRAVENOUS; SUBCUTANEOUS at 17:19

## 2017-07-29 RX ADMIN — Medication 4 UNITS: at 17:17

## 2017-07-29 RX ADMIN — METHOCARBAMOL 750 MG: 500 TABLET ORAL at 12:15

## 2017-07-29 RX ADMIN — INSULIN LISPRO 3 UNITS: 100 INJECTION, SOLUTION INTRAVENOUS; SUBCUTANEOUS at 21:42

## 2017-07-29 RX ADMIN — METHOCARBAMOL 750 MG: 500 TABLET ORAL at 09:01

## 2017-07-29 ASSESSMENT — PAIN DESCRIPTION - ORIENTATION: ORIENTATION: RIGHT;LEFT

## 2017-07-29 ASSESSMENT — PAIN DESCRIPTION - PROGRESSION: CLINICAL_PROGRESSION: NOT CHANGED

## 2017-07-29 ASSESSMENT — PAIN DESCRIPTION - FREQUENCY: FREQUENCY: INTERMITTENT

## 2017-07-29 ASSESSMENT — PAIN DESCRIPTION - LOCATION: LOCATION: SHOULDER

## 2017-07-29 ASSESSMENT — PAIN SCALES - GENERAL
PAINLEVEL_OUTOF10: 8
PAINLEVEL_OUTOF10: 8

## 2017-07-29 ASSESSMENT — PAIN DESCRIPTION - DESCRIPTORS: DESCRIPTORS: ACHING

## 2017-07-29 ASSESSMENT — PAIN DESCRIPTION - ONSET: ONSET: ON-GOING

## 2017-07-29 ASSESSMENT — PAIN DESCRIPTION - PAIN TYPE: TYPE: CHRONIC PAIN

## 2017-07-30 LAB
GLUCOSE BLD-MCNC: 226 MG/DL (ref 70–108)
GLUCOSE BLD-MCNC: 230 MG/DL (ref 70–108)
GLUCOSE BLD-MCNC: 239 MG/DL (ref 70–108)
GLUCOSE BLD-MCNC: 336 MG/DL (ref 70–108)

## 2017-07-30 PROCEDURE — A6222 GAUZE <=16 IN NO W/SAL W/O B: HCPCS

## 2017-07-30 PROCEDURE — 99231 SBSQ HOSP IP/OBS SF/LOW 25: CPT | Performed by: NURSE PRACTITIONER

## 2017-07-30 PROCEDURE — 6370000000 HC RX 637 (ALT 250 FOR IP): Performed by: PSYCHIATRY & NEUROLOGY

## 2017-07-30 PROCEDURE — 82948 REAGENT STRIP/BLOOD GLUCOSE: CPT

## 2017-07-30 PROCEDURE — 1240000000 HC EMOTIONAL WELLNESS R&B

## 2017-07-30 PROCEDURE — 6370000000 HC RX 637 (ALT 250 FOR IP): Performed by: PHYSICIAN ASSISTANT

## 2017-07-30 RX ADMIN — METHOCARBAMOL 750 MG: 500 TABLET ORAL at 21:38

## 2017-07-30 RX ADMIN — OXYCODONE HYDROCHLORIDE AND ACETAMINOPHEN 2 TABLET: 5; 325 TABLET ORAL at 20:22

## 2017-07-30 RX ADMIN — INSULIN GLARGINE 15 UNITS: 100 INJECTION, SOLUTION SUBCUTANEOUS at 20:22

## 2017-07-30 RX ADMIN — TRAZODONE HYDROCHLORIDE 50 MG: 50 TABLET ORAL at 21:38

## 2017-07-30 RX ADMIN — METHOCARBAMOL 750 MG: 500 TABLET ORAL at 12:38

## 2017-07-30 RX ADMIN — ARIPIPRAZOLE 10 MG: 10 TABLET ORAL at 09:02

## 2017-07-30 RX ADMIN — Medication 6 UNITS: at 12:35

## 2017-07-30 RX ADMIN — Medication 4 UNITS: at 17:07

## 2017-07-30 RX ADMIN — INSULIN LISPRO 15 UNITS: 100 INJECTION, SOLUTION INTRAVENOUS; SUBCUTANEOUS at 12:36

## 2017-07-30 RX ADMIN — HYDROXYZINE PAMOATE 25 MG: 25 CAPSULE ORAL at 21:37

## 2017-07-30 RX ADMIN — INSULIN LISPRO 15 UNITS: 100 INJECTION, SOLUTION INTRAVENOUS; SUBCUTANEOUS at 09:05

## 2017-07-30 RX ADMIN — METHOCARBAMOL 750 MG: 500 TABLET ORAL at 17:09

## 2017-07-30 RX ADMIN — SERTRALINE 100 MG: 100 TABLET, FILM COATED ORAL at 09:02

## 2017-07-30 RX ADMIN — INSULIN LISPRO 4 UNITS: 100 INJECTION, SOLUTION INTRAVENOUS; SUBCUTANEOUS at 20:22

## 2017-07-30 RX ADMIN — INSULIN LISPRO 15 UNITS: 100 INJECTION, SOLUTION INTRAVENOUS; SUBCUTANEOUS at 17:08

## 2017-07-30 RX ADMIN — METHOCARBAMOL 750 MG: 500 TABLET ORAL at 09:03

## 2017-07-30 RX ADMIN — Medication 4 UNITS: at 09:03

## 2017-07-30 ASSESSMENT — PAIN DESCRIPTION - PROGRESSION
CLINICAL_PROGRESSION: NOT CHANGED
CLINICAL_PROGRESSION: NOT CHANGED

## 2017-07-30 ASSESSMENT — PAIN DESCRIPTION - FREQUENCY
FREQUENCY: INTERMITTENT
FREQUENCY: INTERMITTENT

## 2017-07-30 ASSESSMENT — PAIN DESCRIPTION - DESCRIPTORS
DESCRIPTORS: ACHING;DISCOMFORT
DESCRIPTORS: ACHING;DISCOMFORT

## 2017-07-30 ASSESSMENT — PAIN DESCRIPTION - LOCATION
LOCATION: SHOULDER
LOCATION: SHOULDER

## 2017-07-30 ASSESSMENT — PAIN DESCRIPTION - ORIENTATION
ORIENTATION: RIGHT;LEFT
ORIENTATION: LEFT

## 2017-07-30 ASSESSMENT — PAIN DESCRIPTION - PAIN TYPE
TYPE: ACUTE PAIN
TYPE: CHRONIC PAIN

## 2017-07-30 ASSESSMENT — PAIN DESCRIPTION - ONSET
ONSET: ON-GOING
ONSET: ON-GOING

## 2017-07-30 ASSESSMENT — PAIN SCALES - GENERAL
PAINLEVEL_OUTOF10: 10
PAINLEVEL_OUTOF10: 10
PAINLEVEL_OUTOF10: 8

## 2017-07-31 LAB
GLUCOSE BLD-MCNC: 197 MG/DL (ref 70–108)
GLUCOSE BLD-MCNC: 214 MG/DL (ref 70–108)
GLUCOSE BLD-MCNC: 217 MG/DL (ref 70–108)
GLUCOSE BLD-MCNC: 232 MG/DL (ref 70–108)

## 2017-07-31 PROCEDURE — 6370000000 HC RX 637 (ALT 250 FOR IP): Performed by: PHYSICIAN ASSISTANT

## 2017-07-31 PROCEDURE — 6370000000 HC RX 637 (ALT 250 FOR IP): Performed by: PSYCHIATRY & NEUROLOGY

## 2017-07-31 PROCEDURE — 99232 SBSQ HOSP IP/OBS MODERATE 35: CPT | Performed by: PHYSICIAN ASSISTANT

## 2017-07-31 PROCEDURE — 6370000000 HC RX 637 (ALT 250 FOR IP): Performed by: NURSE PRACTITIONER

## 2017-07-31 PROCEDURE — 82948 REAGENT STRIP/BLOOD GLUCOSE: CPT

## 2017-07-31 PROCEDURE — 1240000000 HC EMOTIONAL WELLNESS R&B

## 2017-07-31 RX ORDER — MIRTAZAPINE 15 MG/1
15 TABLET, FILM COATED ORAL NIGHTLY
Status: DISCONTINUED | OUTPATIENT
Start: 2017-07-31 | End: 2017-08-07 | Stop reason: HOSPADM

## 2017-07-31 RX ADMIN — Medication 2 UNITS: at 18:33

## 2017-07-31 RX ADMIN — HYDROXYZINE PAMOATE 25 MG: 25 CAPSULE ORAL at 21:02

## 2017-07-31 RX ADMIN — INSULIN LISPRO 15 UNITS: 100 INJECTION, SOLUTION INTRAVENOUS; SUBCUTANEOUS at 13:25

## 2017-07-31 RX ADMIN — INSULIN LISPRO 15 UNITS: 100 INJECTION, SOLUTION INTRAVENOUS; SUBCUTANEOUS at 18:36

## 2017-07-31 RX ADMIN — INSULIN GLARGINE 15 UNITS: 100 INJECTION, SOLUTION SUBCUTANEOUS at 21:00

## 2017-07-31 RX ADMIN — METHOCARBAMOL 750 MG: 500 TABLET ORAL at 08:27

## 2017-07-31 RX ADMIN — SERTRALINE 100 MG: 100 TABLET, FILM COATED ORAL at 08:27

## 2017-07-31 RX ADMIN — OXYCODONE HYDROCHLORIDE AND ACETAMINOPHEN 2 TABLET: 5; 325 TABLET ORAL at 20:58

## 2017-07-31 RX ADMIN — Medication 4 UNITS: at 08:28

## 2017-07-31 RX ADMIN — Medication 4 UNITS: at 13:26

## 2017-07-31 RX ADMIN — METHOCARBAMOL 750 MG: 500 TABLET ORAL at 18:37

## 2017-07-31 RX ADMIN — INSULIN LISPRO 2 UNITS: 100 INJECTION, SOLUTION INTRAVENOUS; SUBCUTANEOUS at 21:00

## 2017-07-31 RX ADMIN — ARIPIPRAZOLE 10 MG: 10 TABLET ORAL at 08:27

## 2017-07-31 RX ADMIN — MIRTAZAPINE 15 MG: 15 TABLET, FILM COATED ORAL at 20:57

## 2017-07-31 RX ADMIN — INSULIN LISPRO 15 UNITS: 100 INJECTION, SOLUTION INTRAVENOUS; SUBCUTANEOUS at 08:24

## 2017-07-31 RX ADMIN — METHOCARBAMOL 750 MG: 500 TABLET ORAL at 20:58

## 2017-07-31 RX ADMIN — METHOCARBAMOL 750 MG: 500 TABLET ORAL at 14:14

## 2017-07-31 ASSESSMENT — PAIN DESCRIPTION - LOCATION: LOCATION: SHOULDER

## 2017-07-31 ASSESSMENT — PAIN DESCRIPTION - ONSET: ONSET: ON-GOING

## 2017-07-31 ASSESSMENT — PAIN DESCRIPTION - DESCRIPTORS: DESCRIPTORS: ACHING;DISCOMFORT

## 2017-07-31 ASSESSMENT — PAIN DESCRIPTION - PAIN TYPE: TYPE: SURGICAL PAIN

## 2017-07-31 ASSESSMENT — PAIN DESCRIPTION - ORIENTATION: ORIENTATION: LEFT

## 2017-07-31 ASSESSMENT — PAIN SCALES - GENERAL
PAINLEVEL_OUTOF10: 9
PAINLEVEL_OUTOF10: 9
PAINLEVEL_OUTOF10: 3

## 2017-07-31 ASSESSMENT — PAIN DESCRIPTION - PROGRESSION: CLINICAL_PROGRESSION: NOT CHANGED

## 2017-07-31 ASSESSMENT — PAIN DESCRIPTION - FREQUENCY: FREQUENCY: CONTINUOUS

## 2017-07-31 ASSESSMENT — ACTIVITIES OF DAILY LIVING (ADL): EFFECT OF PAIN ON DAILY ACTIVITIES: DECREASED COMFORT

## 2017-08-01 LAB
GLUCOSE BLD-MCNC: 124 MG/DL (ref 70–108)
GLUCOSE BLD-MCNC: 148 MG/DL (ref 70–108)
GLUCOSE BLD-MCNC: 229 MG/DL (ref 70–108)
GLUCOSE BLD-MCNC: 295 MG/DL (ref 70–108)
GLUCOSE BLD-MCNC: 316 MG/DL (ref 70–108)

## 2017-08-01 PROCEDURE — 6370000000 HC RX 637 (ALT 250 FOR IP): Performed by: PHYSICIAN ASSISTANT

## 2017-08-01 PROCEDURE — 1240000000 HC EMOTIONAL WELLNESS R&B

## 2017-08-01 PROCEDURE — 82948 REAGENT STRIP/BLOOD GLUCOSE: CPT

## 2017-08-01 PROCEDURE — 6370000000 HC RX 637 (ALT 250 FOR IP): Performed by: PSYCHIATRY & NEUROLOGY

## 2017-08-01 PROCEDURE — 99231 SBSQ HOSP IP/OBS SF/LOW 25: CPT | Performed by: PSYCHIATRY & NEUROLOGY

## 2017-08-01 RX ADMIN — INSULIN LISPRO 15 UNITS: 100 INJECTION, SOLUTION INTRAVENOUS; SUBCUTANEOUS at 08:00

## 2017-08-01 RX ADMIN — METHOCARBAMOL 750 MG: 500 TABLET ORAL at 20:29

## 2017-08-01 RX ADMIN — METHOCARBAMOL 750 MG: 500 TABLET ORAL at 17:08

## 2017-08-01 RX ADMIN — INSULIN LISPRO 15 UNITS: 100 INJECTION, SOLUTION INTRAVENOUS; SUBCUTANEOUS at 17:04

## 2017-08-01 RX ADMIN — METHOCARBAMOL 750 MG: 500 TABLET ORAL at 08:06

## 2017-08-01 RX ADMIN — MIRTAZAPINE 15 MG: 15 TABLET, FILM COATED ORAL at 20:29

## 2017-08-01 RX ADMIN — METHOCARBAMOL 750 MG: 500 TABLET ORAL at 12:48

## 2017-08-01 RX ADMIN — SERTRALINE 100 MG: 100 TABLET, FILM COATED ORAL at 08:06

## 2017-08-01 RX ADMIN — INSULIN LISPRO 3 UNITS: 100 INJECTION, SOLUTION INTRAVENOUS; SUBCUTANEOUS at 20:23

## 2017-08-01 RX ADMIN — INSULIN GLARGINE 15 UNITS: 100 INJECTION, SOLUTION SUBCUTANEOUS at 20:26

## 2017-08-01 RX ADMIN — OXYCODONE HYDROCHLORIDE AND ACETAMINOPHEN 2 TABLET: 5; 325 TABLET ORAL at 20:38

## 2017-08-01 RX ADMIN — ARIPIPRAZOLE 10 MG: 10 TABLET ORAL at 08:06

## 2017-08-01 RX ADMIN — Medication 4 UNITS: at 08:08

## 2017-08-01 RX ADMIN — Medication 8 UNITS: at 17:01

## 2017-08-01 RX ADMIN — Medication 2 UNITS: at 12:43

## 2017-08-01 ASSESSMENT — PAIN SCALES - GENERAL
PAINLEVEL_OUTOF10: 0
PAINLEVEL_OUTOF10: 8

## 2017-08-01 ASSESSMENT — PAIN DESCRIPTION - ORIENTATION: ORIENTATION: LEFT

## 2017-08-01 ASSESSMENT — PAIN DESCRIPTION - LOCATION: LOCATION: SHOULDER

## 2017-08-02 LAB
GLUCOSE BLD-MCNC: 129 MG/DL (ref 70–108)
GLUCOSE BLD-MCNC: 173 MG/DL (ref 70–108)
GLUCOSE BLD-MCNC: 179 MG/DL (ref 70–108)
GLUCOSE BLD-MCNC: 235 MG/DL (ref 70–108)

## 2017-08-02 PROCEDURE — 6370000000 HC RX 637 (ALT 250 FOR IP): Performed by: PHYSICIAN ASSISTANT

## 2017-08-02 PROCEDURE — 82948 REAGENT STRIP/BLOOD GLUCOSE: CPT

## 2017-08-02 PROCEDURE — 99231 SBSQ HOSP IP/OBS SF/LOW 25: CPT | Performed by: PHYSICIAN ASSISTANT

## 2017-08-02 PROCEDURE — A6222 GAUZE <=16 IN NO W/SAL W/O B: HCPCS

## 2017-08-02 PROCEDURE — 1240000000 HC EMOTIONAL WELLNESS R&B

## 2017-08-02 PROCEDURE — 6370000000 HC RX 637 (ALT 250 FOR IP): Performed by: PSYCHIATRY & NEUROLOGY

## 2017-08-02 RX ORDER — SERTRALINE HYDROCHLORIDE 100 MG/1
100 TABLET, FILM COATED ORAL DAILY
Qty: 14 TABLET | Refills: 1 | Status: SHIPPED | OUTPATIENT
Start: 2017-08-02 | End: 2017-08-07 | Stop reason: HOSPADM

## 2017-08-02 RX ORDER — ARIPIPRAZOLE 10 MG/1
10 TABLET ORAL DAILY
Qty: 14 TABLET | Refills: 1 | Status: ON HOLD | OUTPATIENT
Start: 2017-08-02 | End: 2017-09-01

## 2017-08-02 RX ADMIN — METHOCARBAMOL 750 MG: 500 TABLET ORAL at 08:46

## 2017-08-02 RX ADMIN — Medication 2 UNITS: at 08:47

## 2017-08-02 RX ADMIN — METHOCARBAMOL 750 MG: 500 TABLET ORAL at 18:41

## 2017-08-02 RX ADMIN — METHOCARBAMOL 750 MG: 500 TABLET ORAL at 12:31

## 2017-08-02 RX ADMIN — SERTRALINE 100 MG: 100 TABLET, FILM COATED ORAL at 08:46

## 2017-08-02 RX ADMIN — ARIPIPRAZOLE 10 MG: 10 TABLET ORAL at 08:46

## 2017-08-02 RX ADMIN — HYDROXYZINE PAMOATE 25 MG: 25 CAPSULE ORAL at 20:57

## 2017-08-02 RX ADMIN — MIRTAZAPINE 15 MG: 15 TABLET, FILM COATED ORAL at 20:57

## 2017-08-02 RX ADMIN — OXYCODONE HYDROCHLORIDE AND ACETAMINOPHEN 2 TABLET: 5; 325 TABLET ORAL at 20:57

## 2017-08-02 RX ADMIN — INSULIN LISPRO 15 UNITS: 100 INJECTION, SOLUTION INTRAVENOUS; SUBCUTANEOUS at 08:48

## 2017-08-02 RX ADMIN — INSULIN LISPRO 2 UNITS: 100 INJECTION, SOLUTION INTRAVENOUS; SUBCUTANEOUS at 21:09

## 2017-08-02 RX ADMIN — Medication 2 UNITS: at 12:30

## 2017-08-02 RX ADMIN — METHOCARBAMOL 750 MG: 500 TABLET ORAL at 20:57

## 2017-08-02 RX ADMIN — INSULIN LISPRO 15 UNITS: 100 INJECTION, SOLUTION INTRAVENOUS; SUBCUTANEOUS at 18:41

## 2017-08-02 RX ADMIN — OXYCODONE HYDROCHLORIDE AND ACETAMINOPHEN 2 TABLET: 5; 325 TABLET ORAL at 08:46

## 2017-08-02 RX ADMIN — INSULIN GLARGINE 15 UNITS: 100 INJECTION, SOLUTION SUBCUTANEOUS at 21:09

## 2017-08-02 RX ADMIN — INSULIN LISPRO 15 UNITS: 100 INJECTION, SOLUTION INTRAVENOUS; SUBCUTANEOUS at 12:31

## 2017-08-02 ASSESSMENT — PAIN SCALES - GENERAL
PAINLEVEL_OUTOF10: 6
PAINLEVEL_OUTOF10: 10
PAINLEVEL_OUTOF10: 8
PAINLEVEL_OUTOF10: 8

## 2017-08-02 ASSESSMENT — PAIN DESCRIPTION - LOCATION
LOCATION: SHOULDER
LOCATION: INCISION;SHOULDER

## 2017-08-02 ASSESSMENT — ACTIVITIES OF DAILY LIVING (ADL): EFFECT OF PAIN ON DAILY ACTIVITIES: DECREASED COMFORT

## 2017-08-02 ASSESSMENT — PAIN DESCRIPTION - ORIENTATION
ORIENTATION: LEFT
ORIENTATION: LEFT

## 2017-08-02 ASSESSMENT — PAIN DESCRIPTION - PAIN TYPE
TYPE: SURGICAL PAIN
TYPE: CHRONIC PAIN

## 2017-08-02 ASSESSMENT — PAIN DESCRIPTION - ONSET: ONSET: ON-GOING

## 2017-08-02 ASSESSMENT — PAIN DESCRIPTION - DESCRIPTORS: DESCRIPTORS: ACHING;DISCOMFORT

## 2017-08-02 ASSESSMENT — PAIN DESCRIPTION - PROGRESSION: CLINICAL_PROGRESSION: NOT CHANGED

## 2017-08-02 ASSESSMENT — PAIN DESCRIPTION - FREQUENCY: FREQUENCY: CONTINUOUS

## 2017-08-03 LAB
GLUCOSE BLD-MCNC: 165 MG/DL (ref 70–108)
GLUCOSE BLD-MCNC: 174 MG/DL (ref 70–108)
GLUCOSE BLD-MCNC: 179 MG/DL (ref 70–108)
GLUCOSE BLD-MCNC: 223 MG/DL (ref 70–108)

## 2017-08-03 PROCEDURE — 1240000000 HC EMOTIONAL WELLNESS R&B

## 2017-08-03 PROCEDURE — 82948 REAGENT STRIP/BLOOD GLUCOSE: CPT

## 2017-08-03 PROCEDURE — 6370000000 HC RX 637 (ALT 250 FOR IP): Performed by: PSYCHIATRY & NEUROLOGY

## 2017-08-03 PROCEDURE — 99231 SBSQ HOSP IP/OBS SF/LOW 25: CPT | Performed by: PHYSICIAN ASSISTANT

## 2017-08-03 PROCEDURE — 6370000000 HC RX 637 (ALT 250 FOR IP): Performed by: PHYSICIAN ASSISTANT

## 2017-08-03 RX ADMIN — METHOCARBAMOL 750 MG: 500 TABLET ORAL at 18:49

## 2017-08-03 RX ADMIN — METHOCARBAMOL 750 MG: 500 TABLET ORAL at 09:21

## 2017-08-03 RX ADMIN — INSULIN LISPRO 15 UNITS: 100 INJECTION, SOLUTION INTRAVENOUS; SUBCUTANEOUS at 18:49

## 2017-08-03 RX ADMIN — Medication 2 UNITS: at 09:19

## 2017-08-03 RX ADMIN — MIRTAZAPINE 15 MG: 15 TABLET, FILM COATED ORAL at 21:15

## 2017-08-03 RX ADMIN — INSULIN LISPRO 15 UNITS: 100 INJECTION, SOLUTION INTRAVENOUS; SUBCUTANEOUS at 09:20

## 2017-08-03 RX ADMIN — Medication 2 UNITS: at 13:09

## 2017-08-03 RX ADMIN — INSULIN LISPRO 15 UNITS: 100 INJECTION, SOLUTION INTRAVENOUS; SUBCUTANEOUS at 13:16

## 2017-08-03 RX ADMIN — METHOCARBAMOL 750 MG: 500 TABLET ORAL at 13:08

## 2017-08-03 RX ADMIN — SERTRALINE 100 MG: 100 TABLET, FILM COATED ORAL at 09:20

## 2017-08-03 RX ADMIN — INSULIN GLARGINE 15 UNITS: 100 INJECTION, SOLUTION SUBCUTANEOUS at 21:15

## 2017-08-03 RX ADMIN — ARIPIPRAZOLE 10 MG: 10 TABLET ORAL at 09:20

## 2017-08-03 RX ADMIN — METHOCARBAMOL 750 MG: 500 TABLET ORAL at 21:15

## 2017-08-03 RX ADMIN — Medication 2 UNITS: at 18:47

## 2017-08-03 RX ADMIN — INSULIN LISPRO 2 UNITS: 100 INJECTION, SOLUTION INTRAVENOUS; SUBCUTANEOUS at 21:16

## 2017-08-04 LAB
GLUCOSE BLD-MCNC: 136 MG/DL (ref 70–108)
GLUCOSE BLD-MCNC: 189 MG/DL (ref 70–108)
GLUCOSE BLD-MCNC: 191 MG/DL (ref 70–108)
GLUCOSE BLD-MCNC: 265 MG/DL (ref 70–108)

## 2017-08-04 PROCEDURE — 82948 REAGENT STRIP/BLOOD GLUCOSE: CPT

## 2017-08-04 PROCEDURE — 99231 SBSQ HOSP IP/OBS SF/LOW 25: CPT | Performed by: PHYSICIAN ASSISTANT

## 2017-08-04 PROCEDURE — 6370000000 HC RX 637 (ALT 250 FOR IP): Performed by: PSYCHIATRY & NEUROLOGY

## 2017-08-04 PROCEDURE — 1240000000 HC EMOTIONAL WELLNESS R&B

## 2017-08-04 PROCEDURE — 6370000000 HC RX 637 (ALT 250 FOR IP): Performed by: PHYSICIAN ASSISTANT

## 2017-08-04 RX ADMIN — ARIPIPRAZOLE 10 MG: 10 TABLET ORAL at 08:12

## 2017-08-04 RX ADMIN — SERTRALINE 100 MG: 100 TABLET, FILM COATED ORAL at 08:12

## 2017-08-04 RX ADMIN — METHOCARBAMOL 750 MG: 500 TABLET ORAL at 12:37

## 2017-08-04 RX ADMIN — OXYCODONE HYDROCHLORIDE AND ACETAMINOPHEN 2 TABLET: 5; 325 TABLET ORAL at 08:23

## 2017-08-04 RX ADMIN — MIRTAZAPINE 15 MG: 15 TABLET, FILM COATED ORAL at 21:18

## 2017-08-04 RX ADMIN — METHOCARBAMOL 750 MG: 500 TABLET ORAL at 08:12

## 2017-08-04 RX ADMIN — Medication 6 UNITS: at 12:35

## 2017-08-04 RX ADMIN — INSULIN LISPRO 15 UNITS: 100 INJECTION, SOLUTION INTRAVENOUS; SUBCUTANEOUS at 17:13

## 2017-08-04 RX ADMIN — INSULIN LISPRO 15 UNITS: 100 INJECTION, SOLUTION INTRAVENOUS; SUBCUTANEOUS at 12:37

## 2017-08-04 RX ADMIN — INSULIN LISPRO 15 UNITS: 100 INJECTION, SOLUTION INTRAVENOUS; SUBCUTANEOUS at 08:15

## 2017-08-04 RX ADMIN — Medication 2 UNITS: at 08:14

## 2017-08-04 RX ADMIN — INSULIN GLARGINE 15 UNITS: 100 INJECTION, SOLUTION SUBCUTANEOUS at 21:19

## 2017-08-04 RX ADMIN — METHOCARBAMOL 750 MG: 500 TABLET ORAL at 21:18

## 2017-08-04 RX ADMIN — METHOCARBAMOL 750 MG: 500 TABLET ORAL at 17:11

## 2017-08-04 RX ADMIN — Medication 2 UNITS: at 17:14

## 2017-08-04 ASSESSMENT — PAIN SCALES - GENERAL
PAINLEVEL_OUTOF10: 9
PAINLEVEL_OUTOF10: 6

## 2017-08-05 LAB
GLUCOSE BLD-MCNC: 181 MG/DL (ref 70–108)
GLUCOSE BLD-MCNC: 236 MG/DL (ref 70–108)
GLUCOSE BLD-MCNC: 285 MG/DL (ref 70–108)
GLUCOSE BLD-MCNC: 286 MG/DL (ref 70–108)

## 2017-08-05 PROCEDURE — 6370000000 HC RX 637 (ALT 250 FOR IP): Performed by: PHYSICIAN ASSISTANT

## 2017-08-05 PROCEDURE — 99231 SBSQ HOSP IP/OBS SF/LOW 25: CPT | Performed by: PSYCHIATRY & NEUROLOGY

## 2017-08-05 PROCEDURE — 82948 REAGENT STRIP/BLOOD GLUCOSE: CPT

## 2017-08-05 PROCEDURE — 1240000000 HC EMOTIONAL WELLNESS R&B

## 2017-08-05 PROCEDURE — 6370000000 HC RX 637 (ALT 250 FOR IP): Performed by: PSYCHIATRY & NEUROLOGY

## 2017-08-05 RX ADMIN — ARIPIPRAZOLE 10 MG: 10 TABLET ORAL at 09:13

## 2017-08-05 RX ADMIN — INSULIN LISPRO 15 UNITS: 100 INJECTION, SOLUTION INTRAVENOUS; SUBCUTANEOUS at 17:22

## 2017-08-05 RX ADMIN — INSULIN LISPRO 15 UNITS: 100 INJECTION, SOLUTION INTRAVENOUS; SUBCUTANEOUS at 12:30

## 2017-08-05 RX ADMIN — INSULIN LISPRO 3 UNITS: 100 INJECTION, SOLUTION INTRAVENOUS; SUBCUTANEOUS at 21:18

## 2017-08-05 RX ADMIN — Medication 6 UNITS: at 09:10

## 2017-08-05 RX ADMIN — Medication 4 UNITS: at 17:19

## 2017-08-05 RX ADMIN — METHOCARBAMOL 750 MG: 500 TABLET ORAL at 17:17

## 2017-08-05 RX ADMIN — INSULIN GLARGINE 15 UNITS: 100 INJECTION, SOLUTION SUBCUTANEOUS at 21:17

## 2017-08-05 RX ADMIN — METHOCARBAMOL 750 MG: 500 TABLET ORAL at 21:17

## 2017-08-05 RX ADMIN — METHOCARBAMOL 750 MG: 500 TABLET ORAL at 09:13

## 2017-08-05 RX ADMIN — METHOCARBAMOL 750 MG: 500 TABLET ORAL at 12:31

## 2017-08-05 RX ADMIN — SERTRALINE 150 MG: 100 TABLET, FILM COATED ORAL at 09:13

## 2017-08-05 RX ADMIN — Medication 2 UNITS: at 12:29

## 2017-08-05 RX ADMIN — INSULIN LISPRO 15 UNITS: 100 INJECTION, SOLUTION INTRAVENOUS; SUBCUTANEOUS at 09:12

## 2017-08-05 RX ADMIN — MIRTAZAPINE 15 MG: 15 TABLET, FILM COATED ORAL at 21:17

## 2017-08-05 ASSESSMENT — PAIN SCALES - GENERAL: PAINLEVEL_OUTOF10: 0

## 2017-08-06 LAB
GLUCOSE BLD-MCNC: 187 MG/DL (ref 70–108)
GLUCOSE BLD-MCNC: 197 MG/DL (ref 70–108)
GLUCOSE BLD-MCNC: 240 MG/DL (ref 70–108)
GLUCOSE BLD-MCNC: 248 MG/DL (ref 70–108)

## 2017-08-06 PROCEDURE — 6370000000 HC RX 637 (ALT 250 FOR IP): Performed by: NURSE PRACTITIONER

## 2017-08-06 PROCEDURE — 99231 SBSQ HOSP IP/OBS SF/LOW 25: CPT | Performed by: NURSE PRACTITIONER

## 2017-08-06 PROCEDURE — 6370000000 HC RX 637 (ALT 250 FOR IP): Performed by: PSYCHIATRY & NEUROLOGY

## 2017-08-06 PROCEDURE — 82948 REAGENT STRIP/BLOOD GLUCOSE: CPT

## 2017-08-06 PROCEDURE — 6370000000 HC RX 637 (ALT 250 FOR IP): Performed by: PHYSICIAN ASSISTANT

## 2017-08-06 PROCEDURE — 1240000000 HC EMOTIONAL WELLNESS R&B

## 2017-08-06 RX ADMIN — Medication 4 UNITS: at 09:13

## 2017-08-06 RX ADMIN — METHOCARBAMOL 750 MG: 500 TABLET ORAL at 17:46

## 2017-08-06 RX ADMIN — METHOCARBAMOL 750 MG: 500 TABLET ORAL at 13:24

## 2017-08-06 RX ADMIN — METHOCARBAMOL 750 MG: 500 TABLET ORAL at 09:14

## 2017-08-06 RX ADMIN — METHOCARBAMOL 750 MG: 500 TABLET ORAL at 21:19

## 2017-08-06 RX ADMIN — MIRTAZAPINE 15 MG: 15 TABLET, FILM COATED ORAL at 21:19

## 2017-08-06 RX ADMIN — ARIPIPRAZOLE 10 MG: 10 TABLET ORAL at 09:14

## 2017-08-06 RX ADMIN — INSULIN LISPRO 15 UNITS: 100 INJECTION, SOLUTION INTRAVENOUS; SUBCUTANEOUS at 13:23

## 2017-08-06 RX ADMIN — INSULIN LISPRO 2 UNITS: 100 INJECTION, SOLUTION INTRAVENOUS; SUBCUTANEOUS at 21:20

## 2017-08-06 RX ADMIN — SERTRALINE 150 MG: 100 TABLET, FILM COATED ORAL at 09:14

## 2017-08-06 RX ADMIN — HYDROXYZINE PAMOATE 25 MG: 25 CAPSULE ORAL at 23:11

## 2017-08-06 RX ADMIN — Medication 2 UNITS: at 13:21

## 2017-08-06 RX ADMIN — INSULIN LISPRO 15 UNITS: 100 INJECTION, SOLUTION INTRAVENOUS; SUBCUTANEOUS at 17:45

## 2017-08-06 RX ADMIN — INSULIN LISPRO 15 UNITS: 100 INJECTION, SOLUTION INTRAVENOUS; SUBCUTANEOUS at 09:13

## 2017-08-06 RX ADMIN — HYDROXYZINE PAMOATE 25 MG: 25 CAPSULE ORAL at 01:19

## 2017-08-06 RX ADMIN — Medication 2 UNITS: at 17:44

## 2017-08-06 RX ADMIN — INSULIN GLARGINE 15 UNITS: 100 INJECTION, SOLUTION SUBCUTANEOUS at 21:20

## 2017-08-06 ASSESSMENT — PAIN SCALES - GENERAL: PAINLEVEL_OUTOF10: 0

## 2017-08-07 VITALS
TEMPERATURE: 98.1 F | OXYGEN SATURATION: 98 % | HEART RATE: 99 BPM | DIASTOLIC BLOOD PRESSURE: 66 MMHG | RESPIRATION RATE: 18 BRPM | HEIGHT: 66 IN | SYSTOLIC BLOOD PRESSURE: 124 MMHG

## 2017-08-07 LAB
GLUCOSE BLD-MCNC: 202 MG/DL (ref 70–108)
GLUCOSE BLD-MCNC: 241 MG/DL (ref 70–108)

## 2017-08-07 PROCEDURE — 6370000000 HC RX 637 (ALT 250 FOR IP): Performed by: PHYSICIAN ASSISTANT

## 2017-08-07 PROCEDURE — 5130000000 HC BRIDGE APPOINTMENT

## 2017-08-07 PROCEDURE — 6370000000 HC RX 637 (ALT 250 FOR IP): Performed by: PSYCHIATRY & NEUROLOGY

## 2017-08-07 PROCEDURE — 82948 REAGENT STRIP/BLOOD GLUCOSE: CPT

## 2017-08-07 PROCEDURE — 99238 HOSP IP/OBS DSCHRG MGMT 30/<: CPT | Performed by: PSYCHIATRY & NEUROLOGY

## 2017-08-07 RX ORDER — MIRTAZAPINE 15 MG/1
15 TABLET, FILM COATED ORAL NIGHTLY
Qty: 30 TABLET | Refills: 0 | Status: ON HOLD | OUTPATIENT
Start: 2017-08-07 | End: 2017-09-01

## 2017-08-07 RX ADMIN — Medication 4 UNITS: at 08:53

## 2017-08-07 RX ADMIN — SERTRALINE 150 MG: 100 TABLET, FILM COATED ORAL at 08:54

## 2017-08-07 RX ADMIN — INSULIN LISPRO 15 UNITS: 100 INJECTION, SOLUTION INTRAVENOUS; SUBCUTANEOUS at 12:27

## 2017-08-07 RX ADMIN — METHOCARBAMOL 750 MG: 500 TABLET ORAL at 08:54

## 2017-08-07 RX ADMIN — METHOCARBAMOL 750 MG: 500 TABLET ORAL at 07:50

## 2017-08-07 RX ADMIN — INSULIN LISPRO 15 UNITS: 100 INJECTION, SOLUTION INTRAVENOUS; SUBCUTANEOUS at 08:51

## 2017-08-07 RX ADMIN — METHOCARBAMOL 750 MG: 500 TABLET ORAL at 12:38

## 2017-08-07 RX ADMIN — Medication 4 UNITS: at 12:25

## 2017-08-07 RX ADMIN — ARIPIPRAZOLE 10 MG: 10 TABLET ORAL at 08:54

## 2017-08-17 ENCOUNTER — HOSPITAL ENCOUNTER (INPATIENT)
Age: 47
LOS: 7 days | Discharge: HOME | DRG: 908 | End: 2017-08-24
Attending: ORTHOPAEDIC SURGERY | Admitting: ORTHOPAEDIC SURGERY
Payer: MEDICARE

## 2017-08-17 ENCOUNTER — APPOINTMENT (OUTPATIENT)
Dept: MRI IMAGING | Age: 47
DRG: 908 | End: 2017-08-17
Attending: ORTHOPAEDIC SURGERY
Payer: MEDICARE

## 2017-08-17 LAB — GLUCOSE BLD-MCNC: 308 MG/DL (ref 70–108)

## 2017-08-17 PROCEDURE — 87075 CULTR BACTERIA EXCEPT BLOOD: CPT

## 2017-08-17 PROCEDURE — 73221 MRI JOINT UPR EXTREM W/O DYE: CPT

## 2017-08-17 PROCEDURE — 2580000003 HC RX 258: Performed by: PHYSICIAN ASSISTANT

## 2017-08-17 PROCEDURE — 1200000000 HC SEMI PRIVATE

## 2017-08-17 PROCEDURE — 6370000000 HC RX 637 (ALT 250 FOR IP): Performed by: PHYSICIAN ASSISTANT

## 2017-08-17 PROCEDURE — 82948 REAGENT STRIP/BLOOD GLUCOSE: CPT

## 2017-08-17 PROCEDURE — 87070 CULTURE OTHR SPECIMN AEROBIC: CPT

## 2017-08-17 PROCEDURE — 87205 SMEAR GRAM STAIN: CPT

## 2017-08-17 RX ORDER — MORPHINE SULFATE 4 MG/ML
4 INJECTION, SOLUTION INTRAMUSCULAR; INTRAVENOUS
Status: DISCONTINUED | OUTPATIENT
Start: 2017-08-17 | End: 2017-08-24 | Stop reason: HOSPADM

## 2017-08-17 RX ORDER — SODIUM CHLORIDE 9 MG/ML
INJECTION, SOLUTION INTRAVENOUS CONTINUOUS
Status: DISCONTINUED | OUTPATIENT
Start: 2017-08-17 | End: 2017-08-24 | Stop reason: HOSPADM

## 2017-08-17 RX ORDER — ACETAMINOPHEN 325 MG/1
650 TABLET ORAL EVERY 4 HOURS PRN
Status: DISCONTINUED | OUTPATIENT
Start: 2017-08-17 | End: 2017-08-24 | Stop reason: HOSPADM

## 2017-08-17 RX ORDER — DIAZEPAM 5 MG/1
5 TABLET ORAL ONCE
Status: COMPLETED | OUTPATIENT
Start: 2017-08-17 | End: 2017-08-17

## 2017-08-17 RX ORDER — OXYCODONE HYDROCHLORIDE AND ACETAMINOPHEN 5; 325 MG/1; MG/1
2 TABLET ORAL EVERY 4 HOURS PRN
Status: DISCONTINUED | OUTPATIENT
Start: 2017-08-17 | End: 2017-08-24 | Stop reason: HOSPADM

## 2017-08-17 RX ORDER — BLOOD-GLUCOSE METER
1 KIT MISCELLANEOUS
Status: DISCONTINUED | OUTPATIENT
Start: 2017-08-17 | End: 2017-08-17 | Stop reason: CLARIF

## 2017-08-17 RX ORDER — SERTRALINE HYDROCHLORIDE 100 MG/1
200 TABLET, FILM COATED ORAL DAILY
Status: DISCONTINUED | OUTPATIENT
Start: 2017-08-17 | End: 2017-08-24 | Stop reason: HOSPADM

## 2017-08-17 RX ORDER — OXYCODONE HYDROCHLORIDE AND ACETAMINOPHEN 5; 325 MG/1; MG/1
1 TABLET ORAL EVERY 4 HOURS PRN
Status: DISCONTINUED | OUTPATIENT
Start: 2017-08-17 | End: 2017-08-24 | Stop reason: HOSPADM

## 2017-08-17 RX ORDER — MIRTAZAPINE 15 MG/1
15 TABLET, FILM COATED ORAL NIGHTLY
Status: DISCONTINUED | OUTPATIENT
Start: 2017-08-17 | End: 2017-08-24 | Stop reason: HOSPADM

## 2017-08-17 RX ORDER — SODIUM CHLORIDE 0.9 % (FLUSH) 0.9 %
10 SYRINGE (ML) INJECTION PRN
Status: DISCONTINUED | OUTPATIENT
Start: 2017-08-17 | End: 2017-08-24 | Stop reason: HOSPADM

## 2017-08-17 RX ORDER — ONDANSETRON 2 MG/ML
4 INJECTION INTRAMUSCULAR; INTRAVENOUS EVERY 6 HOURS PRN
Status: DISCONTINUED | OUTPATIENT
Start: 2017-08-17 | End: 2017-08-24 | Stop reason: HOSPADM

## 2017-08-17 RX ORDER — MORPHINE SULFATE 2 MG/ML
2 INJECTION, SOLUTION INTRAMUSCULAR; INTRAVENOUS
Status: DISCONTINUED | OUTPATIENT
Start: 2017-08-17 | End: 2017-08-24 | Stop reason: HOSPADM

## 2017-08-17 RX ORDER — INSULIN GLARGINE 100 [IU]/ML
15 INJECTION, SOLUTION SUBCUTANEOUS NIGHTLY
Status: DISCONTINUED | OUTPATIENT
Start: 2017-08-17 | End: 2017-08-24 | Stop reason: HOSPADM

## 2017-08-17 RX ORDER — SODIUM CHLORIDE 0.9 % (FLUSH) 0.9 %
10 SYRINGE (ML) INJECTION EVERY 12 HOURS SCHEDULED
Status: DISCONTINUED | OUTPATIENT
Start: 2017-08-17 | End: 2017-08-24 | Stop reason: HOSPADM

## 2017-08-17 RX ORDER — HYDROCODONE BITARTRATE AND ACETAMINOPHEN 5; 325 MG/1; MG/1
1 TABLET ORAL EVERY 4 HOURS PRN
Status: DISCONTINUED | OUTPATIENT
Start: 2017-08-17 | End: 2017-08-17

## 2017-08-17 RX ORDER — HYDROCODONE BITARTRATE AND ACETAMINOPHEN 5; 325 MG/1; MG/1
2 TABLET ORAL EVERY 4 HOURS PRN
Status: DISCONTINUED | OUTPATIENT
Start: 2017-08-17 | End: 2017-08-17

## 2017-08-17 RX ORDER — ARIPIPRAZOLE 10 MG/1
10 TABLET ORAL DAILY
Status: DISCONTINUED | OUTPATIENT
Start: 2017-08-17 | End: 2017-08-18

## 2017-08-17 RX ADMIN — MIRTAZAPINE 15 MG: 15 TABLET, FILM COATED ORAL at 21:38

## 2017-08-17 RX ADMIN — ARIPIPRAZOLE 10 MG: 10 TABLET ORAL at 21:38

## 2017-08-17 RX ADMIN — INSULIN GLARGINE 15 UNITS: 100 INJECTION, SOLUTION SUBCUTANEOUS at 21:51

## 2017-08-17 RX ADMIN — SODIUM CHLORIDE: 9 INJECTION, SOLUTION INTRAVENOUS at 21:39

## 2017-08-17 RX ADMIN — DIAZEPAM 5 MG: 5 TABLET ORAL at 21:39

## 2017-08-17 RX ADMIN — Medication 15 UNITS: at 21:52

## 2017-08-17 NOTE — IP AVS SNAPSHOT
Recommended Labs or Other Treatments After Discharge: ***    Physician Certification: I certify the above information and transfer of Odalis Bach  is necessary for the continuing treatment of the diagnosis listed and that he requires {Admit to Appropriate Level of Care:90528} for {GREATER/LESS:391670397} 30 days. Update Admission H&P: {CHP DME Changes in MALIKA:452890357}    PHYSICIAN SIGNATURE:  Electronically signed by Lew Almanza MD on 8/23/17 at 11:04 AM    CASE MANAGEMENT/SOCIAL WORK SECTION    Inpatient Status Date: ***    [facility] Readmission Risk Assessment Score:  Risk Score: 17   (Score > 14= high risk for readmission)    Discharging to Facility/ Agency   · Name:   · Address:  · Phone:  · Fax:    Dialysis Facility (if applicable)   · Name:  · Address:  · Dialysis Schedule:  · Phone:  · Fax:    / signature: {Esignature:[id number]}          Diet Instructions     ? Good nutrition is important when healing from an illness, injury, or surgery. Follow any nutrition recommendations given to you during your hospital stay. ? If you were given an oral nutrition supplement while in the hospital, continue to take this supplement at home. You can take it with meals, in-between meals, and/or before bedtime. These supplements can be purchased at most local grocery stores, pharmacies, and chain [employer]-stores. ? If you have any questions about your diet or nutrition, call the hospital and ask for the dietitian. Important information for a smoker       SMOKING: QUIT SMOKING. THIS IS THE MOST IMPORTANT ACTION YOU CAN TAKE TO IMPROVE YOUR CURRENT AND FUTURE HEALTH. Call the [facility] NOW ([phone])    Smoking harms nonsmokers. When nonsmokers are around people who smoke, they absorb nicotine, carbon monoxide, and other ingredients of tobacco smoke.      DO NOT SMOKE AROUND CHILDREN

## 2017-08-17 NOTE — H&P
History and Physical        CHIEF COMPLAINT:  Left Shoulder Pain with drainage from wound    HISTORY OF PRESENT ILLNESS:      The patient is a 50 y.o. male  who presents with ongoing left shoulder pain and drainage from prior surgical wound of left shoulder. Patient was initially seen by us in June 2017 for infected left shoulder. He was then brought back to the OR on July 20 2017 for closure of this wound. He had been doing well however recently noticed pus from the incision. Denies any fever or chills. No numbness or tingling. He has been in the psych unit here at [facility] secondary to suicidal tendencies. Patient was at [facility] where they evaluated his left shoulder. Determined the patient would need transferred to [facility] for definitive care by Dr Kunal Bone. Past Medical History:    Past Medical History:   Diagnosis Date    Bipolar 1 disorder ([facility] [address]     follows with therapist Lakshmi Douglas    Diabetes [facility])     dx  age 20- follows with Dr Godwin Sprague Diabetes mellitus [facility])     Diabetic foot ulcer ([facility] [address]     Diabetic ulcer of right foot associated with type 2 diabetes mellitus ([facility] [address] 12/10/2015    Heart murmur     denies any chest pain or palpitations    Hypertension     \"never been on b/p medication that I know of\"    WD-Skin ulcer of fourth toe of right foot with necrosis of bone ([facility] [address] 6/29/2016       Past Surgical History:    Past Surgical History:   Procedure Laterality Date    ABSCESS DRAINAGE Right     foot    FOOT DEBRIDEMENT Right 07/01/2016    I & D    LEG AMPUTATION BELOW KNEE Right 07/20/2016    OTHER SURGICAL HISTORY Right 1/14/15    sole of foot I&D    TOE AMPUTATION Right 1/16/15    2nd toe with wound vac applied    WISDOM TOOTH EXTRACTION  ? when       Medications Prior to Admission:   Prior to Admission medications    Medication Sig Start Date End Date Taking?  Authorizing Provider   mirtazapine (REMERON) 15 MG tablet Take 1 tablet by mouth nightly 8/7/17  Yes Celeste VALENZUELA

## 2017-08-18 ENCOUNTER — ANESTHESIA (OUTPATIENT)
Dept: OPERATING ROOM | Age: 47
DRG: 908 | End: 2017-08-18
Payer: MEDICARE

## 2017-08-18 ENCOUNTER — ANESTHESIA EVENT (OUTPATIENT)
Dept: OPERATING ROOM | Age: 47
DRG: 908 | End: 2017-08-18
Payer: MEDICARE

## 2017-08-18 VITALS
SYSTOLIC BLOOD PRESSURE: 100 MMHG | OXYGEN SATURATION: 100 % | DIASTOLIC BLOOD PRESSURE: 58 MMHG | RESPIRATION RATE: 12 BRPM

## 2017-08-18 LAB
ANION GAP SERPL CALCULATED.3IONS-SCNC: 12 MEQ/L (ref 8–16)
ANISOCYTOSIS: ABNORMAL
AVERAGE GLUCOSE: 183 MG/DL (ref 70–126)
BASOPHILS # BLD: 0.4 %
BASOPHILS ABSOLUTE: 0 THOU/MM3 (ref 0–0.1)
BUN BLDV-MCNC: 18 MG/DL (ref 7–22)
CALCIUM SERPL-MCNC: 9.2 MG/DL (ref 8.5–10.5)
CHLORIDE BLD-SCNC: 100 MEQ/L (ref 98–111)
CO2: 25 MEQ/L (ref 23–33)
CREAT SERPL-MCNC: 0.6 MG/DL (ref 0.4–1.2)
EOSINOPHIL # BLD: 4.5 %
EOSINOPHILS ABSOLUTE: 0.4 THOU/MM3 (ref 0–0.4)
GFR SERPL CREATININE-BSD FRML MDRD: > 90 ML/MIN/1.73M2
GLUCOSE BLD-MCNC: 142 MG/DL (ref 70–108)
GLUCOSE BLD-MCNC: 207 MG/DL (ref 70–108)
GLUCOSE BLD-MCNC: 223 MG/DL (ref 70–108)
HBA1C MFR BLD: 8.1 % (ref 4.4–6.4)
HCT VFR BLD CALC: 37.8 % (ref 42–52)
HEMOGLOBIN: 12.7 GM/DL (ref 14–18)
LYMPHOCYTES # BLD: 29.9 %
LYMPHOCYTES ABSOLUTE: 2.9 THOU/MM3 (ref 1–4.8)
MCH RBC QN AUTO: 28 PG (ref 27–31)
MCHC RBC AUTO-ENTMCNC: 33.6 GM/DL (ref 33–37)
MCV RBC AUTO: 83.4 FL (ref 80–94)
MONOCYTES # BLD: 10.8 %
MONOCYTES ABSOLUTE: 1 THOU/MM3 (ref 0.4–1.3)
NUCLEATED RED BLOOD CELLS: 0 /100 WBC
PDW BLD-RTO: 16.8 % (ref 11.5–14.5)
PLATELET # BLD: 297 THOU/MM3 (ref 130–400)
PMV BLD AUTO: 7.5 MCM (ref 7.4–10.4)
POTASSIUM SERPL-SCNC: 4.2 MEQ/L (ref 3.5–5.2)
RBC # BLD: 4.53 MILL/MM3 (ref 4.7–6.1)
RBC # BLD: NORMAL 10*6/UL
SEG NEUTROPHILS: 54.4 %
SEGMENTED NEUTROPHILS ABSOLUTE COUNT: 5.2 THOU/MM3 (ref 1.8–7.7)
SODIUM BLD-SCNC: 137 MEQ/L (ref 135–145)
WBC # BLD: 9.6 THOU/MM3 (ref 4.8–10.8)

## 2017-08-18 PROCEDURE — 1200000000 HC SEMI PRIVATE

## 2017-08-18 PROCEDURE — 7100000000 HC PACU RECOVERY - FIRST 15 MIN: Performed by: ORTHOPAEDIC SURGERY

## 2017-08-18 PROCEDURE — 6370000000 HC RX 637 (ALT 250 FOR IP): Performed by: NURSE PRACTITIONER

## 2017-08-18 PROCEDURE — 83036 HEMOGLOBIN GLYCOSYLATED A1C: CPT

## 2017-08-18 PROCEDURE — 3700000000 HC ANESTHESIA ATTENDED CARE: Performed by: ORTHOPAEDIC SURGERY

## 2017-08-18 PROCEDURE — 2580000003 HC RX 258: Performed by: PHYSICIAN ASSISTANT

## 2017-08-18 PROCEDURE — 3600000002 HC SURGERY LEVEL 2 BASE: Performed by: ORTHOPAEDIC SURGERY

## 2017-08-18 PROCEDURE — 7100000001 HC PACU RECOVERY - ADDTL 15 MIN: Performed by: ORTHOPAEDIC SURGERY

## 2017-08-18 PROCEDURE — A6446 CONFORM BAND S W>=3" <5"/YD: HCPCS | Performed by: ORTHOPAEDIC SURGERY

## 2017-08-18 PROCEDURE — 87070 CULTURE OTHR SPECIMN AEROBIC: CPT

## 2017-08-18 PROCEDURE — 3600000012 HC SURGERY LEVEL 2 ADDTL 15MIN: Performed by: ORTHOPAEDIC SURGERY

## 2017-08-18 PROCEDURE — 87102 FUNGUS ISOLATION CULTURE: CPT

## 2017-08-18 PROCEDURE — 6360000002 HC RX W HCPCS: Performed by: NURSE ANESTHETIST, CERTIFIED REGISTERED

## 2017-08-18 PROCEDURE — 99221 1ST HOSP IP/OBS SF/LOW 40: CPT | Performed by: PHYSICIAN ASSISTANT

## 2017-08-18 PROCEDURE — 0RBK0ZZ EXCISION OF LEFT SHOULDER JOINT, OPEN APPROACH: ICD-10-PCS | Performed by: ORTHOPAEDIC SURGERY

## 2017-08-18 PROCEDURE — 6370000000 HC RX 637 (ALT 250 FOR IP): Performed by: PHYSICIAN ASSISTANT

## 2017-08-18 PROCEDURE — 36415 COLL VENOUS BLD VENIPUNCTURE: CPT

## 2017-08-18 PROCEDURE — 2580000003 HC RX 258: Performed by: NURSE ANESTHETIST, CERTIFIED REGISTERED

## 2017-08-18 PROCEDURE — 85025 COMPLETE CBC W/AUTO DIFF WBC: CPT

## 2017-08-18 PROCEDURE — 2500000003 HC RX 250 WO HCPCS: Performed by: NURSE ANESTHETIST, CERTIFIED REGISTERED

## 2017-08-18 PROCEDURE — 2500000003 HC RX 250 WO HCPCS: Performed by: NURSE PRACTITIONER

## 2017-08-18 PROCEDURE — 87075 CULTR BACTERIA EXCEPT BLOOD: CPT

## 2017-08-18 PROCEDURE — 3700000001 HC ADD 15 MINUTES (ANESTHESIA): Performed by: ORTHOPAEDIC SURGERY

## 2017-08-18 PROCEDURE — 93005 ELECTROCARDIOGRAM TRACING: CPT

## 2017-08-18 PROCEDURE — 87205 SMEAR GRAM STAIN: CPT

## 2017-08-18 PROCEDURE — 82948 REAGENT STRIP/BLOOD GLUCOSE: CPT

## 2017-08-18 PROCEDURE — 80048 BASIC METABOLIC PNL TOTAL CA: CPT

## 2017-08-18 RX ORDER — DEXTROSE MONOHYDRATE 25 G/50ML
12.5 INJECTION, SOLUTION INTRAVENOUS PRN
Status: DISCONTINUED | OUTPATIENT
Start: 2017-08-18 | End: 2017-08-24 | Stop reason: HOSPADM

## 2017-08-18 RX ORDER — SODIUM CHLORIDE 9 MG/ML
INJECTION, SOLUTION INTRAVENOUS CONTINUOUS PRN
Status: DISCONTINUED | OUTPATIENT
Start: 2017-08-18 | End: 2017-08-18 | Stop reason: SDUPTHER

## 2017-08-18 RX ORDER — FENTANYL CITRATE 50 UG/ML
INJECTION, SOLUTION INTRAMUSCULAR; INTRAVENOUS PRN
Status: DISCONTINUED | OUTPATIENT
Start: 2017-08-18 | End: 2017-08-18 | Stop reason: SDUPTHER

## 2017-08-18 RX ORDER — DOXYCYCLINE HYCLATE 100 MG
100 TABLET ORAL EVERY 12 HOURS SCHEDULED
Status: DISCONTINUED | OUTPATIENT
Start: 2017-08-18 | End: 2017-08-24 | Stop reason: HOSPADM

## 2017-08-18 RX ORDER — LIDOCAINE HYDROCHLORIDE 20 MG/ML
INJECTION, SOLUTION INFILTRATION; PERINEURAL PRN
Status: DISCONTINUED | OUTPATIENT
Start: 2017-08-18 | End: 2017-08-18 | Stop reason: SDUPTHER

## 2017-08-18 RX ORDER — DIPHENHYDRAMINE HYDROCHLORIDE 50 MG/ML
25 INJECTION INTRAMUSCULAR; INTRAVENOUS EVERY 6 HOURS PRN
Status: DISCONTINUED | OUTPATIENT
Start: 2017-08-18 | End: 2017-08-24 | Stop reason: HOSPADM

## 2017-08-18 RX ORDER — PROPOFOL 10 MG/ML
INJECTION, EMULSION INTRAVENOUS PRN
Status: DISCONTINUED | OUTPATIENT
Start: 2017-08-18 | End: 2017-08-18 | Stop reason: SDUPTHER

## 2017-08-18 RX ORDER — OXYCODONE HYDROCHLORIDE AND ACETAMINOPHEN 5; 325 MG/1; MG/1
1-2 TABLET ORAL EVERY 4 HOURS PRN
Qty: 80 TABLET | Refills: 0 | Status: ON HOLD | OUTPATIENT
Start: 2017-08-18 | End: 2017-09-01 | Stop reason: HOSPADM

## 2017-08-18 RX ORDER — CLINDAMYCIN PHOSPHATE 150 MG/ML
INJECTION, SOLUTION INTRAVENOUS PRN
Status: DISCONTINUED | OUTPATIENT
Start: 2017-08-18 | End: 2017-08-18 | Stop reason: SDUPTHER

## 2017-08-18 RX ORDER — MIDAZOLAM HYDROCHLORIDE 1 MG/ML
INJECTION INTRAMUSCULAR; INTRAVENOUS PRN
Status: DISCONTINUED | OUTPATIENT
Start: 2017-08-18 | End: 2017-08-18 | Stop reason: SDUPTHER

## 2017-08-18 RX ORDER — ARIPIPRAZOLE 15 MG/1
15 TABLET ORAL DAILY
Status: DISCONTINUED | OUTPATIENT
Start: 2017-08-19 | End: 2017-08-24

## 2017-08-18 RX ORDER — NICOTINE POLACRILEX 4 MG
15 LOZENGE BUCCAL PRN
Status: DISCONTINUED | OUTPATIENT
Start: 2017-08-18 | End: 2017-08-24 | Stop reason: HOSPADM

## 2017-08-18 RX ORDER — CLINDAMYCIN PHOSPHATE 600 MG/50ML
600 INJECTION INTRAVENOUS
Status: DISCONTINUED | OUTPATIENT
Start: 2017-08-18 | End: 2017-08-18

## 2017-08-18 RX ORDER — DEXTROSE MONOHYDRATE 50 MG/ML
100 INJECTION, SOLUTION INTRAVENOUS PRN
Status: DISCONTINUED | OUTPATIENT
Start: 2017-08-18 | End: 2017-08-24 | Stop reason: HOSPADM

## 2017-08-18 RX ADMIN — PHENYLEPHRINE HYDROCHLORIDE 100 MCG: 10 INJECTION INTRAMUSCULAR; INTRAVENOUS; SUBCUTANEOUS at 13:36

## 2017-08-18 RX ADMIN — PHENYLEPHRINE HYDROCHLORIDE 100 MCG: 10 INJECTION INTRAMUSCULAR; INTRAVENOUS; SUBCUTANEOUS at 13:41

## 2017-08-18 RX ADMIN — MIDAZOLAM HYDROCHLORIDE 2 MG: 1 INJECTION INTRAMUSCULAR; INTRAVENOUS at 13:20

## 2017-08-18 RX ADMIN — Medication 10 ML: at 09:22

## 2017-08-18 RX ADMIN — PHENYLEPHRINE HYDROCHLORIDE 100 MCG: 10 INJECTION INTRAMUSCULAR; INTRAVENOUS; SUBCUTANEOUS at 13:29

## 2017-08-18 RX ADMIN — DOXYCYCLINE HYCLATE 100 MG: 100 TABLET, COATED ORAL at 21:32

## 2017-08-18 RX ADMIN — INSULIN GLARGINE 15 UNITS: 100 INJECTION, SOLUTION SUBCUTANEOUS at 21:32

## 2017-08-18 RX ADMIN — MIRTAZAPINE 15 MG: 15 TABLET, FILM COATED ORAL at 21:33

## 2017-08-18 RX ADMIN — LIDOCAINE HYDROCHLORIDE 50 MG: 20 INJECTION, SOLUTION INFILTRATION; PERINEURAL at 13:24

## 2017-08-18 RX ADMIN — SODIUM CHLORIDE: 9 INJECTION, SOLUTION INTRAVENOUS at 13:19

## 2017-08-18 RX ADMIN — CLINDAMYCIN PHOSPHATE 600 MG: 12 INJECTION, SOLUTION INTRAVENOUS at 19:08

## 2017-08-18 RX ADMIN — PROPOFOL 160 MG: 10 INJECTION, EMULSION INTRAVENOUS at 13:24

## 2017-08-18 RX ADMIN — FENTANYL CITRATE 50 MCG: 50 INJECTION INTRAMUSCULAR; INTRAVENOUS at 13:24

## 2017-08-18 RX ADMIN — SODIUM CHLORIDE: 9 INJECTION, SOLUTION INTRAVENOUS at 19:03

## 2017-08-18 RX ADMIN — CLINDAMYCIN PHOSPHATE 600 MG: 150 INJECTION, SOLUTION INTRAVENOUS at 13:34

## 2017-08-18 ASSESSMENT — PULMONARY FUNCTION TESTS
PIF_VALUE: 4
PIF_VALUE: 5
PIF_VALUE: 5
PIF_VALUE: 4
PIF_VALUE: 14
PIF_VALUE: 4
PIF_VALUE: 5
PIF_VALUE: 4
PIF_VALUE: 0
PIF_VALUE: 15
PIF_VALUE: 4
PIF_VALUE: 16
PIF_VALUE: 14
PIF_VALUE: 4
PIF_VALUE: 0
PIF_VALUE: 4
PIF_VALUE: 15
PIF_VALUE: 12
PIF_VALUE: 1
PIF_VALUE: 0
PIF_VALUE: 4
PIF_VALUE: 3
PIF_VALUE: 12
PIF_VALUE: 3
PIF_VALUE: 6
PIF_VALUE: 4
PIF_VALUE: 16
PIF_VALUE: 5
PIF_VALUE: 15
PIF_VALUE: 5
PIF_VALUE: 4
PIF_VALUE: 2
PIF_VALUE: 4

## 2017-08-18 ASSESSMENT — PAIN SCALES - GENERAL: PAINLEVEL_OUTOF10: 0

## 2017-08-18 NOTE — CONSULTS
pending, MRI of left shoulder pending, possible I&D per ortho surgery. Infectious disease consulted per surgery  2. DM-2, uncontrolled--Hgb A1c 10.1 on 6/30/16. Will obtain updated Hgb A1c. Continue Lantus 15 units nightly, sliding scale. No scheduled Humalog bolus as NPO. Add hypoglycemia protocol, glucose checks. 3. Normocytic Anemia--Hgb 12.7 with MCV of 83.4. Baseline 12.7-13.5.   4. S/P BKA, right sided 7/20/16  5. Bipolar Disorder--recently admitted to [address] [place] [address],  for suicidal ideations, discharged on 8/7/17. Current suicidal ideations, consult psychiatry. Dispo: EKG obtained and reviewed with Dr. Camron Lucas, no ischemic changes noted. Patient had moderate risk for surgery with known diabetes and infection; however the patient has had two prior surgeries on this shoulder from June 2017-July 2017 and tolerated them well. No rosita-operative complications per patient. May proceed with surgery. Thank you for the consultation.     I have discussed this patient's care and plan with Dr. Camron Lucas  Electronically signed by Marilou Loredo PA-C on 8/18/2017 at 6:59 AM

## 2017-08-18 NOTE — CONSULTS
Psychiatry Consult    Reason for Consult:   Concern Threat to self requiring 24 hour professional observation  The primary source(s) of information include(s):  patient:  and electronic record. HISTORY OF PRESENT ILLNESS:       Per ED note the patient is a 50 y.o. male  who presents with ongoing left shoulder pain and drainage from prior surgical wound of left shoulder. Patient was initially seen by us in June 2017 for infected left shoulder. He was then brought back to the OR on July 20 2017 for closure of this wound. He had been doing well however recently noticed pus from the incision. Denies any fever or chills. No numbness or tingling. He has been in the psych unit here at [address] [place] [address] secondary to suicidal tendencies. Patient was at [facility] where they evaluated his left shoulder. Determined the patient would need transferred to [address] [place] [address] for definitive care by Dr Angy Ramsey  Patient is a 50 y.o.single  male with significant past psych history of Bipolar Disorder who was seen today for suicidal thoughts with plan to slice his wrist. Patient was seen in [facility] in July of this year. Reported that he was at [facility] seeking help with abscessed  wound on his left shoulder. Had surgery on that shoulder in June at this hospital and have had to have additional 3 surgeries to drain the wound. Patient says he is here to have another surgery for same reason. While at [facility] he started to have thoughts to hurt self and was sent to [facility] behavioral for follow up before being finally transferred to [address] [place] [address] to follow up with his surgeons. Patient reported that he has been having worsening depressive symptoms since his was discharged from [facility]. Stated that his stressors stems from a mixture of everything ranging from his health issues to financial issues and to his living condition.  Reported sad feelings, constant thought to hurt by slicing wrist, anhedonia, low energy, no motivation, too much sleep and hopelessness. Reported that he takes all his medications as ordered but does not think that they are working well for him. Hx of mood swings but he denies any hallucinations or delusions. He denies any PTSD or OCD. He reported that he is currently suicidal and will harm self when he leaves here. Patient however is pleasant, smiling and making jokes, therefore mood is incongruent. The patient is not currently receiving care for the above psychiatric illness.     Past mental health outpatient care includes:  3 treatment centers.  Recovery center in [facility]     Past mental health hospitalizations: 6 admissions     [facility]          Anahi or Hypomania:  yes -      Panic Attacks:  yes -      Phobias:  no     Obsessions and Compulsions:  no     Body or Vocal Tics:  no     Hallucinations:  no     Delusions:  no     Past psychiatric medications include:  Zoloft, Abilify, Remeron     Adverse reactions from psychotropic medications:  none       Medications:   clindamycin (CLEOCIN) IV  600 mg Intravenous 60 Min Pre-Op    sodium chloride flush  10 mL Intravenous 2 times per day    ARIPiprazole  10 mg Oral Daily    insulin glargine  15 Units Subcutaneous Nightly    insulin lispro  15 Units Subcutaneous TID AC    mirtazapine  15 mg Oral Nightly    sertraline  200 mg Oral Daily     Continuous Infusions:   dextrose      sodium chloride 100 mL/hr at 08/17/17 2139     PRN Meds:glucose, dextrose, glucagon (rDNA), dextrose, diphenhydrAMINE, sodium chloride flush, acetaminophen, magnesium hydroxide, ondansetron, morphine **OR** morphine, oxyCODONE-acetaminophen, oxyCODONE-acetaminophen    Allergies:    Pcn [penicillins] and Clindamycin/lincomycin    Social History:  TOBACCO:  Never used tobacco   ETOH:  Never drank alcohol  DRUGS:  Never used recreational drugs  SEXUAL HISTORY:  Orientation:  Heterosexual  ACTIVITIES OF DAILY LIVING:  Patient is able to perform all

## 2017-08-18 NOTE — PROGRESS NOTES
motion without deformity. Pt can flex and extend Left fingers. Left shoulder drsg changed now dry and intact. Skin: Skin color, texture, turgor normal.  No rashes or lesions. Neurologic:  Neurovascularly intact without any focal sensory/motor deficits. Cranial nerves: II-XII intact. Sensation intact. Psychiatric: Thought content appropriate, normal insight  Capillary Refill: Brisk,< 3 seconds   Peripheral Pulses: +2 palpable, equal bilaterally    Data  CBC:   Lab Results   Component Value Date    WBC 9.6 08/18/2017    HGB 12.7 08/18/2017     08/18/2017     BMP:  Lab Results   Component Value Date     08/18/2017    K 4.2 08/18/2017     08/18/2017    CO2 25 08/18/2017    BUN 18 08/18/2017    CREATININE 0.6 08/18/2017    CALCIUM 9.2 08/18/2017    GLUCOSE 207 08/18/2017     Uric Acid:  No components found for: URIC  PT/INR:    Lab Results   Component Value Date    PROTIME 12.2 07/19/2016    INR 1.07 07/19/2016     Troponin:  No results found for: TROPONINI  Urine Culture:  No components found for: CURINE    ASSESSMENT:  Active Hospital Problems    Diagnosis Date Noted    Uncontrolled type 2 diabetes mellitus with hyperglycemia, with long-term current use of insulin ([facility]) [E11.65, Z79.4] 07/26/2017    Bipolar affective disorder, current episode depressed ([facility] [address] [F31.30]     Status post below knee amputation of right lower extremity ([facility] [address] [Z89.511] 07/26/2016       PLAN as per Dr. Kay Guzman  1. Dry drsg changes as needed   2. WBAT LUE  3. Continue current medical management   4. Plan for I&D shoulder today 1400  5. Consent Left shoulder I&D   6. NPO now  7.  ID consult for ABX left shoulder infection       Electronically signed by Renée Newby CNP on 8/18/2017 at 10:19 AM

## 2017-08-18 NOTE — PROGRESS NOTES
Nutrition Assessment    Type and Reason for Visit: Initial, Positive Nutrition Screen (wounds)    Nutrition Recommendations: Start oral diet post-op as able. RD will start ONS TID once diet is FL or greater. Recommend multivitamin. Malnutrition Assessment:  · Malnutrition Status: At risk for malnutrition    Nutrition Diagnosis:   · Problem: Inadequate oral intake  · Etiology: related to Acute injury/trauma     Signs and symptoms:  as evidenced by NPO status due to medical condition    Nutrition Assessment:  · Subjective Assessment: Patient admitted with postoperative infection, sequela. Patient currently in OR for drainage for incision and drainage of should wound (OR 6/20/17 for closure of  shoulder wound). Note patient with BKA 7/2016. Review records and patient was last seen by RD on 8/7/17 and was getting Glucerna TID and was acceptable of these. Importance of diet compliance was reinforced and patient did refuse diet review. Current meds include: Lantus, Humalog, Remeron, Zoloft. Current labs: Hbg A1C: 8.1, POC: 308 (8/17/17).     · Wound Type: Surgical Wound (shoulder closure 7/2017; incision and drainage 8/18/17)  · Current Nutrition Therapies:  · Oral Diet Orders: NPO   · Oral Diet intake: NPO  · Oral Nutrition Supplement (ONS) Orders: None  · ONS intake: NPO  · Anthropometric Measures:  · Ht: 5' 6\" (167.6 cm)   · Current Body Wt: 211 lb (95.7 kg)  · Admission Body Wt: 211 lb (95.7 kg)  · Usual Body Wt:  (~220# per EHR)  · Ideal Body Wt: 137 lb (62.1 kg) (adjusted for BKA), % Ideal Body 154%  · Adjusted Body Wt: 156 lb (70.8 kg), body weight adjusted for BKA, Obesity  · BMI Classification: BMI 35.0 - 39.9 Obese Class II (35.9 (adjusted for BKA))  · Comparative Standards (Estimated Nutrition Needs):  · Estimated Daily Total Kcal: 9044-9108  · Estimated Daily Protein (g): 107-142    Estimated Intake vs Estimated Needs: Intake Less Than Needs    Nutrition Risk Level: High    Nutrition

## 2017-08-18 NOTE — CARE COORDINATION
DISCHARGE BARRIERS  8/18/17, 9:44 AM    Reason for Referral:  Discharge plan  Mental Status: alert, oriented   Has sitter due to suicide precautions  Decision Making: makes own decisions   Family/Social/Home Environment:  Patient is from [place], has family in [place], [place], but they are not able to provide support. He lives alone, mentions he manages his own care and decisions. He has few supportive relationships. He has been at [facility] and hopes to return there at discharge. Current Services:  [facility]  Current Equipment: none   Payment Source: [employer] [place]  Concerns or Barriers to Discharge: wants to return to [facility] Financial of ECF/HH were provided: declined     Teach Back Method used with patient regarding care plan and discharge plan  Patient verbalizes understanding of the plan of care and contributes to goal setting. Anticipated Needs/Discharge Plan: spoke with patient about discharge plan. He hopes to return to [facility] at discharge. [facility]        Electronically signed by NEFTALI Carty on 8/18/2017 at 9:44 AM

## 2017-08-19 LAB
ANION GAP SERPL CALCULATED.3IONS-SCNC: 17 MEQ/L (ref 8–16)
ANISOCYTOSIS: ABNORMAL
BASOPHILS # BLD: 0.5 %
BASOPHILS ABSOLUTE: 0 THOU/MM3 (ref 0–0.1)
BUN BLDV-MCNC: 16 MG/DL (ref 7–22)
CALCIUM SERPL-MCNC: 9 MG/DL (ref 8.5–10.5)
CHLORIDE BLD-SCNC: 102 MEQ/L (ref 98–111)
CO2: 23 MEQ/L (ref 23–33)
CREAT SERPL-MCNC: 0.6 MG/DL (ref 0.4–1.2)
EOSINOPHIL # BLD: 4 %
EOSINOPHILS ABSOLUTE: 0.4 THOU/MM3 (ref 0–0.4)
GFR SERPL CREATININE-BSD FRML MDRD: > 90 ML/MIN/1.73M2
GLUCOSE BLD-MCNC: 134 MG/DL (ref 70–108)
GLUCOSE BLD-MCNC: 159 MG/DL (ref 70–108)
GLUCOSE BLD-MCNC: 187 MG/DL (ref 70–108)
GLUCOSE BLD-MCNC: 239 MG/DL (ref 70–108)
HCT VFR BLD CALC: 36.7 % (ref 42–52)
HEMOGLOBIN: 12.3 GM/DL (ref 14–18)
LYMPHOCYTES # BLD: 28.5 %
LYMPHOCYTES ABSOLUTE: 2.6 THOU/MM3 (ref 1–4.8)
MCH RBC QN AUTO: 28.2 PG (ref 27–31)
MCHC RBC AUTO-ENTMCNC: 33.6 GM/DL (ref 33–37)
MCV RBC AUTO: 84 FL (ref 80–94)
MONOCYTES # BLD: 9.4 %
MONOCYTES ABSOLUTE: 0.8 THOU/MM3 (ref 0.4–1.3)
NUCLEATED RED BLOOD CELLS: 0 /100 WBC
PDW BLD-RTO: 16.9 % (ref 11.5–14.5)
PLATELET # BLD: 287 THOU/MM3 (ref 130–400)
PMV BLD AUTO: 7.2 MCM (ref 7.4–10.4)
POTASSIUM SERPL-SCNC: 4.5 MEQ/L (ref 3.5–5.2)
RBC # BLD: 4.37 MILL/MM3 (ref 4.7–6.1)
RBC # BLD: NORMAL 10*6/UL
SEG NEUTROPHILS: 57.6 %
SEGMENTED NEUTROPHILS ABSOLUTE COUNT: 5.2 THOU/MM3 (ref 1.8–7.7)
SODIUM BLD-SCNC: 142 MEQ/L (ref 135–145)
WBC # BLD: 9 THOU/MM3 (ref 4.8–10.8)

## 2017-08-19 PROCEDURE — 6370000000 HC RX 637 (ALT 250 FOR IP): Performed by: PHYSICIAN ASSISTANT

## 2017-08-19 PROCEDURE — 82948 REAGENT STRIP/BLOOD GLUCOSE: CPT

## 2017-08-19 PROCEDURE — 36415 COLL VENOUS BLD VENIPUNCTURE: CPT

## 2017-08-19 PROCEDURE — 99232 SBSQ HOSP IP/OBS MODERATE 35: CPT | Performed by: NURSE PRACTITIONER

## 2017-08-19 PROCEDURE — 2580000003 HC RX 258: Performed by: PHYSICIAN ASSISTANT

## 2017-08-19 PROCEDURE — 6370000000 HC RX 637 (ALT 250 FOR IP): Performed by: NURSE PRACTITIONER

## 2017-08-19 PROCEDURE — 85025 COMPLETE CBC W/AUTO DIFF WBC: CPT

## 2017-08-19 PROCEDURE — 80048 BASIC METABOLIC PNL TOTAL CA: CPT

## 2017-08-19 PROCEDURE — 1200000000 HC SEMI PRIVATE

## 2017-08-19 RX ADMIN — SODIUM CHLORIDE: 9 INJECTION, SOLUTION INTRAVENOUS at 03:06

## 2017-08-19 RX ADMIN — MIRTAZAPINE 15 MG: 15 TABLET, FILM COATED ORAL at 21:45

## 2017-08-19 RX ADMIN — ARIPIPRAZOLE 15 MG: 15 TABLET ORAL at 08:31

## 2017-08-19 RX ADMIN — SERTRALINE 200 MG: 100 TABLET, FILM COATED ORAL at 08:31

## 2017-08-19 RX ADMIN — INSULIN GLARGINE 15 UNITS: 100 INJECTION, SOLUTION SUBCUTANEOUS at 21:51

## 2017-08-19 RX ADMIN — DOXYCYCLINE HYCLATE 100 MG: 100 TABLET, COATED ORAL at 21:45

## 2017-08-19 RX ADMIN — Medication 15 UNITS: at 21:46

## 2017-08-19 RX ADMIN — Medication 10 ML: at 21:47

## 2017-08-19 RX ADMIN — SODIUM CHLORIDE: 9 INJECTION, SOLUTION INTRAVENOUS at 12:17

## 2017-08-19 RX ADMIN — Medication 15 UNITS: at 16:33

## 2017-08-19 RX ADMIN — DOXYCYCLINE HYCLATE 100 MG: 100 TABLET, COATED ORAL at 08:31

## 2017-08-19 ASSESSMENT — PAIN SCALES - GENERAL: PAINLEVEL_OUTOF10: 0

## 2017-08-19 NOTE — OP NOTE
[address] [place] [address]                               OPERATIVE REPORT    PATIENT NAME: Justyn Serra                                :       1970  MED REC NO:   [id number]                                      ROOM:      0025  ACCOUNT NO:   [de-identified]                                      ADMISSION  DATE:  2017  PROVIDER:     Merlin Peak. Vinny Penaloza M.D.    Kasie Underwood:  2017    PREOPERATIVE DIAGNOSIS:  Septic left shoulder. POSTOPERATIVE DIAGNOSIS:  Septic left shoulder. OPERATION PERFORMED:  Arthrotomy, left shoulder for infection. SURGEON:  Merlin Peak. Vinny Penaloza MD    ASSISTANT:  Nikkie Mullen PA-C. ANESTHESIA:  General.    COMPLICATIONS:  None. INDICATIONS:  The patient is a 25-year-old, multiple medical  comorbidities, has had an abscess from the [facility] joint, glenohumeral  joint and debridements. We had wound closure, still has a little bit  of sinus. He would probably benefit from a repeat incision and  drainage with arthrotomy of the joint. The patient agreed. OPERATIVE PROCEDURE:  The patient was brought to the operating room  where he underwent general anesthetic. Left shoulder is prepped and  draped in normal sterile fashion. A timeout was taken. Consent was  confirmed. Using a sharp blade we lifted up that area down the joint. It was right down into the glenohumeral joint. We opened it up a bit  more, washed it out and thoroughly irrigated. Placed the Hemovac  drain, immobilized. Skin closed with nylon suture and dry dressings. The patient was then awakened, returned to recovery room in good  condition. POSTOPERATIVE PLAN:  Weightbearing as tolerated on that side. Dry  dressings p.r.n. His initial MRI did not show any signs of  osteomyelitis, but if his joint does not clear we will have to be  little more aggressive.   He also has significant pysch history, most  likely need to

## 2017-08-19 NOTE — PROGRESS NOTES
the clinical examination. There is a small fluid collection within the subcutaneous tissues    extending medially from the skin defect measuring 0.7 x 2.8 x 3.0 cm in longitudinal, transverse and AP dimensions. This fluid collection has a thin wall without adjacent inflammation. A seroma could have this appearance. No abscess is less likely. There    is subcutaneous edema along the superior aspect of the shoulder which may be reactive or related to a cellulitis. 2. There is resection of the distal left clavicle and acromion. 3. There is moderate nonspecific subcortical edema at the greater tuberosity suggesting a bone bruise. There is no soft tissue wound, cortical destruction or periostitis within this region to suggest osteomyelitis. 4. There is mild subchondral edema along the inferior margin of the humeral head which most likely is degenerative. There is no osseous destruction or periostitis to suggest osteomyelitis. 5. There is a high-grade bursal surface partial tear of the posterior margin of the distal supraspinatus tendon which is almost complete with a thin residual portion of tendon along the articular surface. There is tendinosis of the supraspinatus tendon. There is no significant retraction of the musculotendinous junction of the supraspinatus. 6. There is tendinosis of the subscapularis tendon. 7. There is a large amount of fluid within the long head of the biceps tendon sheath with an associated synovitis and thin septation consistent with a stenosing tenosynovitis. 8. There is a tear of the superior glenoid labrum, anterior to posterior. 9. There is a small joint effusion at the glenohumeral articulation with an associated synovitis. 10. There is a small amount of fluid within the subacromial-subdeltoid bursa with an associated synovitis. **This report has been created using voice recognition software.   It may contain minor errors which are inherent in voice recognition technology. **      Final report electronically signed by Dr. Navjot rBown on 8/18/2017 8:19 AM          Diet: DIET CARB CONTROL; Carb Control: 5 carbs/meal (approximate 2000 kcals/day)    DVT prophylaxis: [] Lovenox                                 [x] SCDs                                 [] SQ Heparin                                 [] Encourage ambulation           [] Already on Anticoagulation     Disposition:    [] Home       [] TCU       [] Rehab       [x] Psych       [] SNF       [] [place]       [] Other-    Code Status: Full Code    Assessment/Plan:    Anticipated Discharge in : Monday    PERRY/Marlen Espinoza 1106 Problems    Diagnosis Date Noted    Uncontrolled type 2 diabetes mellitus with hyperglycemia, with long-term current use of insulin ([facility] [address] [E11.65, Z79.4] 07/26/2017    Bipolar affective disorder, current episode depressed ([facility] [address] [F31.30]     Status post below knee amputation of right lower extremity ([facility] [address] [Z89.511] 07/26/2016       1. Septic arthritis left shoulder--I&D 8/18 with drain placement per ortho; cont doxycycline; sling; ID consulted per ortho  2. Osteomyelitis left shoulder--humeral head, lateral clavicle and acromion; doxy; ID consult  3. Uncontrolled type 2 DM with long term use of insulin--A1c 8.1 which is much improved from previous 10.1 (6/2016); cont lantus, prandial humalog; hypoglycemic protocol; carb control diet  4. Bipolar disorder, current episode depressed--patient was in psychiatric facility in [place]; current SI with plans to use a knife; psych consulted; suicide precautions; will need psychiatric admission, probably on Monday  5.  S/P right BKA--related to DM; has prosthesis    Dispo: await ID recommendations; follow    Electronically signed by Mark Adamson NP on 8/19/2017 at 2:11 PM

## 2017-08-20 LAB
GLUCOSE BLD-MCNC: 137 MG/DL (ref 70–108)
GLUCOSE BLD-MCNC: 140 MG/DL (ref 70–108)
GLUCOSE BLD-MCNC: 159 MG/DL (ref 70–108)
GLUCOSE BLD-MCNC: 178 MG/DL (ref 70–108)

## 2017-08-20 PROCEDURE — 6360000002 HC RX W HCPCS: Performed by: INTERNAL MEDICINE

## 2017-08-20 PROCEDURE — 2580000003 HC RX 258: Performed by: PHYSICIAN ASSISTANT

## 2017-08-20 PROCEDURE — 6370000000 HC RX 637 (ALT 250 FOR IP): Performed by: NURSE PRACTITIONER

## 2017-08-20 PROCEDURE — 82948 REAGENT STRIP/BLOOD GLUCOSE: CPT

## 2017-08-20 PROCEDURE — 99232 SBSQ HOSP IP/OBS MODERATE 35: CPT | Performed by: NURSE PRACTITIONER

## 2017-08-20 PROCEDURE — 1200000000 HC SEMI PRIVATE

## 2017-08-20 PROCEDURE — 6370000000 HC RX 637 (ALT 250 FOR IP): Performed by: PHYSICIAN ASSISTANT

## 2017-08-20 RX ADMIN — DOXYCYCLINE HYCLATE 100 MG: 100 TABLET, COATED ORAL at 21:35

## 2017-08-20 RX ADMIN — INSULIN GLARGINE 15 UNITS: 100 INJECTION, SOLUTION SUBCUTANEOUS at 21:42

## 2017-08-20 RX ADMIN — SERTRALINE 200 MG: 100 TABLET, FILM COATED ORAL at 08:59

## 2017-08-20 RX ADMIN — DOXYCYCLINE HYCLATE 100 MG: 100 TABLET, COATED ORAL at 08:59

## 2017-08-20 RX ADMIN — CEFAZOLIN SODIUM 1 G: 1 INJECTION, SOLUTION INTRAVENOUS at 21:36

## 2017-08-20 RX ADMIN — Medication 10 ML: at 21:36

## 2017-08-20 RX ADMIN — MIRTAZAPINE 15 MG: 15 TABLET, FILM COATED ORAL at 21:35

## 2017-08-20 RX ADMIN — ARIPIPRAZOLE 15 MG: 15 TABLET ORAL at 08:59

## 2017-08-20 RX ADMIN — Medication 15 UNITS: at 09:48

## 2017-08-20 RX ADMIN — Medication 15 UNITS: at 17:02

## 2017-08-20 ASSESSMENT — PAIN SCALES - GENERAL: PAINLEVEL_OUTOF10: 0

## 2017-08-20 NOTE — PROGRESS NOTES
Hospitalist Progress Note    Patient:  Christina Mccrary      Unit/Bed:7K-25/025-A    YOB: 1970    MRN: [id number]       Acct: [de-identified]     PCP: Neida Felix MD    Date of Admission: 8/17/2017    Chief Complaint:  Left shoulder pain/infection     Hospital Course: Transferred from [facility] in [place] to the care of Dr. Sara Roberson. Patient was in psychiatric facility in [place] for Pargi 1. He had initial shoulder surgery for septic shoulder 6/2017 with repeat surgery 7/20/17. He was treated at [facility] for 3 days of IV ATB without improvement and was sent here to his primary surgeon. MRI shows osteo left humeral head, lateral clavicle and acromion. Taken to surgery 8/18 for I&D of shoulder. He is on doxycycline po.  8/20--ID has not been called so will be recalled today. States suicidal thoughts are not as strong. Subjective: In chair with sitter in room. States pain to left shoulder is not too bad and suicidal thoughts are not as strong. No other pain or discomfort and no needs.        Medications:  Reviewed    Infusion Medications    dextrose      sodium chloride 100 mL/hr at 08/19/17 1217     Scheduled Medications    ARIPiprazole  15 mg Oral Daily    doxycycline hyclate  100 mg Oral 2 times per day    sodium chloride flush  10 mL Intravenous 2 times per day    insulin glargine  15 Units Subcutaneous Nightly    insulin lispro  15 Units Subcutaneous TID AC    mirtazapine  15 mg Oral Nightly    sertraline  200 mg Oral Daily     PRN Meds: glucose, dextrose, glucagon (rDNA), dextrose, diphenhydrAMINE, sodium chloride flush, acetaminophen, magnesium hydroxide, ondansetron, morphine **OR** morphine, oxyCODONE-acetaminophen, oxyCODONE-acetaminophen      Intake/Output Summary (Last 24 hours) at 08/20/17 1723  Last data filed at 08/20/17 1357   Gross per 24 hour   Intake             1670 ml   Output              315 ml   Net             1355 ml       Diet:  DIET CARB CONTROL; Carb Control: 5 carbs/meal a skin defect along the superior aspect of the left shoulder which may represent a wound or postoperative change. Correlation should be made with the clinical examination. There is a small fluid collection within the subcutaneous tissues    extending medially from the skin defect measuring 0.7 x 2.8 x 3.0 cm in longitudinal, transverse and AP dimensions. This fluid collection has a thin wall without adjacent inflammation. A seroma could have this appearance. No abscess is less likely. There    is subcutaneous edema along the superior aspect of the shoulder which may be reactive or related to a cellulitis. 2. There is resection of the distal left clavicle and acromion. 3. There is moderate nonspecific subcortical edema at the greater tuberosity suggesting a bone bruise. There is no soft tissue wound, cortical destruction or periostitis within this region to suggest osteomyelitis. 4. There is mild subchondral edema along the inferior margin of the humeral head which most likely is degenerative. There is no osseous destruction or periostitis to suggest osteomyelitis. 5. There is a high-grade bursal surface partial tear of the posterior margin of the distal supraspinatus tendon which is almost complete with a thin residual portion of tendon along the articular surface. There is tendinosis of the supraspinatus tendon. There is no significant retraction of the musculotendinous junction of the supraspinatus. 6. There is tendinosis of the subscapularis tendon. 7. There is a large amount of fluid within the long head of the biceps tendon sheath with an associated synovitis and thin septation consistent with a stenosing tenosynovitis. 8. There is a tear of the superior glenoid labrum, anterior to posterior. 9. There is a small joint effusion at the glenohumeral articulation with an associated synovitis.    10. There is a small amount of fluid within the subacromial-subdeltoid bursa with an associated synovitis. **This report has been created using voice recognition software. It may contain minor errors which are inherent in voice recognition technology. **      Final report electronically signed by Dr. Tatyana Bruner on 8/18/2017 8:19 AM          Diet: DIET CARB CONTROL; Carb Control: 5 carbs/meal (approximate 2000 kcals/day)    DVT prophylaxis: [] Lovenox                                 [x] SCDs                                 [] SQ Heparin                                 [] Encourage ambulation           [] Already on Anticoagulation     Disposition:    [] Home       [] TCU       [] Rehab       [x] Psych       [] SNF       [] [place]       [] Other-    Code Status: Full Code    Assessment/Plan:    Anticipated Discharge in : tomorrow    Active Hospital Problems    Diagnosis Date Noted    Uncontrolled type 2 diabetes mellitus with hyperglycemia, with long-term current use of insulin ([facility] [E11.65, Z79.4] 07/26/2017    Bipolar affective disorder, currently depressed, moderate ([address] [F31.32]     Status post below knee amputation of right lower extremity ([facility] [Z89.511] 07/26/2016     1. Septic arthritis left shoulder--I&D 8/18 with drain placement per ortho; cont doxycycline; sling; ID consulted per ortho  2. Osteomyelitis left shoulder--humeral head, lateral clavicle and acromion; doxy; ID consult  3. Uncontrolled type 2 DM with long term use of insulin--A1c 8.1 which is much improved from previous 10.1 (6/2016); cont lantus, prandial humalog; hypoglycemic protocol; carb control diet; BS last 24 hours 137-159.  4. Bipolar disorder, current episode depressed--patient was in psychiatric facility in [place]; suicidal thoughts are not as strong; suicide precautions; will need psychiatric admission, probably on Monday-patient hopes to return to [place].   5. S/P right BKA--related to DM; has prosthesis    Dispo:  Patient is stable from DM standpoint; will need psych admission for SI; will sign

## 2017-08-21 LAB
C-REACTIVE PROTEIN: 0.81 MG/DL (ref 0–1)
GLUCOSE BLD-MCNC: 154 MG/DL (ref 70–108)
GLUCOSE BLD-MCNC: 163 MG/DL (ref 70–108)
GLUCOSE BLD-MCNC: 236 MG/DL (ref 70–108)
GLUCOSE BLD-MCNC: 276 MG/DL (ref 70–108)

## 2017-08-21 PROCEDURE — 6370000000 HC RX 637 (ALT 250 FOR IP): Performed by: NURSE PRACTITIONER

## 2017-08-21 PROCEDURE — 6360000002 HC RX W HCPCS: Performed by: INTERNAL MEDICINE

## 2017-08-21 PROCEDURE — 6370000000 HC RX 637 (ALT 250 FOR IP): Performed by: PHYSICIAN ASSISTANT

## 2017-08-21 PROCEDURE — 86140 C-REACTIVE PROTEIN: CPT

## 2017-08-21 PROCEDURE — 36415 COLL VENOUS BLD VENIPUNCTURE: CPT

## 2017-08-21 PROCEDURE — 1200000000 HC SEMI PRIVATE

## 2017-08-21 PROCEDURE — 2580000003 HC RX 258: Performed by: PHYSICIAN ASSISTANT

## 2017-08-21 PROCEDURE — 82948 REAGENT STRIP/BLOOD GLUCOSE: CPT

## 2017-08-21 RX ORDER — CEPHALEXIN 500 MG/1
500 CAPSULE ORAL 4 TIMES DAILY
Qty: 112 CAPSULE | Refills: 0 | Status: ON HOLD | OUTPATIENT
Start: 2017-08-21 | End: 2017-09-01 | Stop reason: HOSPADM

## 2017-08-21 RX ADMIN — INSULIN GLARGINE 15 UNITS: 100 INJECTION, SOLUTION SUBCUTANEOUS at 20:51

## 2017-08-21 RX ADMIN — ARIPIPRAZOLE 15 MG: 15 TABLET ORAL at 10:23

## 2017-08-21 RX ADMIN — Medication 15 UNITS: at 10:23

## 2017-08-21 RX ADMIN — DOXYCYCLINE HYCLATE 100 MG: 100 TABLET, COATED ORAL at 10:23

## 2017-08-21 RX ADMIN — CEFAZOLIN SODIUM 1 G: 1 INJECTION, SOLUTION INTRAVENOUS at 10:28

## 2017-08-21 RX ADMIN — SERTRALINE 200 MG: 100 TABLET, FILM COATED ORAL at 10:23

## 2017-08-21 RX ADMIN — Medication 10 ML: at 10:24

## 2017-08-21 RX ADMIN — DOXYCYCLINE HYCLATE 100 MG: 100 TABLET, COATED ORAL at 20:41

## 2017-08-21 RX ADMIN — CEFAZOLIN SODIUM 1 G: 1 INJECTION, SOLUTION INTRAVENOUS at 20:48

## 2017-08-21 RX ADMIN — Medication 10 ML: at 20:43

## 2017-08-21 RX ADMIN — MIRTAZAPINE 15 MG: 15 TABLET, FILM COATED ORAL at 20:41

## 2017-08-21 RX ADMIN — CEFAZOLIN SODIUM 1 G: 1 INJECTION, SOLUTION INTRAVENOUS at 03:45

## 2017-08-21 NOTE — PROGRESS NOTES
Reinforced suicide precautions with patient. Reinforced that visitors will be screened and may be limited at the discretion of the nurse. Visitor belongings are subject to be searched. Belongings may not be allowed into the patient room. Reviewed any personal belongings from the previous shift believed to be a threat to patient safety removed from room. Belongings removed. Room screened for safety, items removed to create a safe environment include:   Blood pressure cuff   Loose or extra cords, tubing (not of medical necessity)   Extra Linens   Telephone   Toiletries   Trash Liners   Patient's cell phone and charging cord (must be removed from room)      Safety tray ordered: yes    Expectations were discussed with sitter (unit support aide). Sitter positioned near exit. Sitter reminded that patient should be observed at all times including toileting and bathing. Sitter has security radio and documentation sheet. Patient and sitter included in hourly rounds.

## 2017-08-21 NOTE — PROGRESS NOTES
Reinforced suicide precautions with patient. Reinforced that visitors will be screened and may be limited at the discretion of the nurse. Visitor belongings are subject to be searched. Belongings may not be allowed into the patient room. Reviewed any personal belongings from the previous shift believed to be a threat to patient safety removed from room. Room screened for safety, items removed to create a safe environment include:   Blood pressure cuff   Loose or extra cords, tubing (not of medical necessity)   Extra Linens   Telephone   Toiletries   Trash Liners   Patient's cell phone and charging cord (must be removed from room)      Safety tray ordered: yes  Expectations were discussed with sitter (unit support aide). Sitter positioned near exit. Sitter reminded that patient should be observed at all times including toileting and bathing. Sitter has security radio and documentation sheet. Patient and sitter included in hourly rounds.

## 2017-08-21 NOTE — CONSULTS
Acute blood loss anemia D62    Gait disturbance R26.9    Sebaceous cyst L72.3    Bipolar affective disorder, currently depressed, moderate ([facility]) F31.32    Uncontrolled type 2 diabetes mellitus with hyperglycemia, with long-term current use of insulin ([facility]) E11.65, Z79.4    Severe bipolar II disorder, recent episode major depressive, remission ([facility]) F31.81           ASSESSMENT AND PLAN   Left shoulder infection had incision and drainage. The culture is negative. We will place patient on oral Keflex 500 mg 4 times a day for 4 weeks. We will check his CRP  Will schedule follow-up visit at the wound clinic upon discharge. Thank you Rendall Cooks, MD for allowing me to participate in this patient's care.     Lynford Spatz, MD,FACP 8/21/2017 10:27 AM

## 2017-08-22 LAB
AEROBIC CULTURE: NORMAL
ANAEROBIC CULTURE: NORMAL
GLUCOSE BLD-MCNC: 181 MG/DL (ref 70–108)
GLUCOSE BLD-MCNC: 207 MG/DL (ref 70–108)
GLUCOSE BLD-MCNC: 219 MG/DL (ref 70–108)
GLUCOSE BLD-MCNC: 244 MG/DL (ref 70–108)
GRAM STAIN RESULT: NORMAL

## 2017-08-22 PROCEDURE — 6370000000 HC RX 637 (ALT 250 FOR IP): Performed by: NURSE PRACTITIONER

## 2017-08-22 PROCEDURE — 6370000000 HC RX 637 (ALT 250 FOR IP): Performed by: PHYSICIAN ASSISTANT

## 2017-08-22 PROCEDURE — 6360000002 HC RX W HCPCS: Performed by: INTERNAL MEDICINE

## 2017-08-22 PROCEDURE — 2580000003 HC RX 258: Performed by: PHYSICIAN ASSISTANT

## 2017-08-22 PROCEDURE — 1200000000 HC SEMI PRIVATE

## 2017-08-22 PROCEDURE — 82948 REAGENT STRIP/BLOOD GLUCOSE: CPT

## 2017-08-22 RX ADMIN — Medication 15 UNITS: at 16:50

## 2017-08-22 RX ADMIN — DOXYCYCLINE HYCLATE 100 MG: 100 TABLET, COATED ORAL at 09:51

## 2017-08-22 RX ADMIN — Medication 15 UNITS: at 09:52

## 2017-08-22 RX ADMIN — SERTRALINE 200 MG: 100 TABLET, FILM COATED ORAL at 09:52

## 2017-08-22 RX ADMIN — MIRTAZAPINE 15 MG: 15 TABLET, FILM COATED ORAL at 20:47

## 2017-08-22 RX ADMIN — ARIPIPRAZOLE 15 MG: 15 TABLET ORAL at 20:47

## 2017-08-22 RX ADMIN — CEFAZOLIN SODIUM 1 G: 1 INJECTION, SOLUTION INTRAVENOUS at 14:33

## 2017-08-22 RX ADMIN — Medication 10 ML: at 04:40

## 2017-08-22 RX ADMIN — CEFAZOLIN SODIUM 1 G: 1 INJECTION, SOLUTION INTRAVENOUS at 20:47

## 2017-08-22 RX ADMIN — Medication 15 UNITS: at 14:32

## 2017-08-22 RX ADMIN — INSULIN GLARGINE 15 UNITS: 100 INJECTION, SOLUTION SUBCUTANEOUS at 20:52

## 2017-08-22 RX ADMIN — DOXYCYCLINE HYCLATE 100 MG: 100 TABLET, COATED ORAL at 20:47

## 2017-08-22 RX ADMIN — CEFAZOLIN SODIUM 1 G: 1 INJECTION, SOLUTION INTRAVENOUS at 04:40

## 2017-08-22 NOTE — PROGRESS NOTES
Progress note: Infectious diseases    Patient - Carl Hankins,  Age - 50 y.o.    - 1970      Room Number - 7K-25/025-A   MRN -  [id number]   Acct # - [de-identified]  Date of Admission -  2017  5:01 PM    SUBJECTIVE:   No new complaints. Awaiting placement. OBJECTIVE   VITALS    height is 5' 6\" (1.676 m) and weight is 211 lb (95.7 kg). His oral temperature is 97 °F (36.1 °C). His blood pressure is 129/74 and his pulse is 96. His respiration is 18 and oxygen saturation is 99%. Wt Readings from Last 3 Encounters:   17 211 lb (95.7 kg)   03/15/17 220 lb 0.3 oz (99.8 kg)   17 220 lb 0.3 oz (99.8 kg)       I/O (24 Hours)    Intake/Output Summary (Last 24 hours) at 17 1040  Last data filed at 17 2318   Gross per 24 hour   Intake             3030 ml   Output              800 ml   Net             2230 ml       General Appearance  Awake, alert, oriented,  not  In acute distress  HEENT - normocephalic, atraumatic, pink conjunctiva,  anicteric sclera  Neck - Supple, no mass  Lungs -  Bilateral good air entry, no rhonchi, no wheeze  Cardiovascular - Heart sounds are normal.   Abdomen - soft, not distended, nontender,   Neurologic -Oriented  Skin - No bruising or bleeding  Extremities - clean dressing on his left shoulder. He had his prosthesis on the right leg below knee.     MEDICATIONS:      ceFAZolin  1 g Intravenous Q8H    ARIPiprazole  15 mg Oral Daily    doxycycline hyclate  100 mg Oral 2 times per day    sodium chloride flush  10 mL Intravenous 2 times per day    insulin glargine  15 Units Subcutaneous Nightly    insulin lispro  15 Units Subcutaneous TID AC    mirtazapine  15 mg Oral Nightly    sertraline  200 mg Oral Daily      dextrose      sodium chloride 100 mL/hr at 17 1217     glucose, dextrose, glucagon (rDNA), dextrose, diphenhydrAMINE, sodium chloride flush,

## 2017-08-22 NOTE — PROGRESS NOTES
PRN  ondansetron (ZOFRAN) injection 4 mg, 4 mg, Intravenous, Q6H PRN  0.9 % sodium chloride infusion, , Intravenous, Continuous  morphine injection 2 mg, 2 mg, Intravenous, Q2H PRN **OR** morphine (PF) injection 4 mg, 4 mg, Intravenous, Q2H PRN  insulin glargine (LANTUS) injection vial 15 Units, 15 Units, Subcutaneous, Nightly  insulin lispro (HUMALOG) injection vial 15 Units, 15 Units, Subcutaneous, TID AC  mirtazapine (REMERON) tablet 15 mg, 15 mg, Oral, Nightly  sertraline (ZOLOFT) tablet 200 mg, 200 mg, Oral, Daily  oxyCODONE-acetaminophen (PERCOCET) 5-325 MG per tablet 1 tablet, 1 tablet, Oral, Q4H PRN  oxyCODONE-acetaminophen (PERCOCET) 5-325 MG per tablet 2 tablet, 2 tablet, Oral, Q4H PRN        PLAN    Awaiting ECF. PT/OT for ROM.

## 2017-08-22 NOTE — PROGRESS NOTES
[facility] notified of results of the C-SSRS screening and the need for further evaluation of patient. Discussed suicide precautions with patient before implementation. Patient notified that they will be observed at all times, including toileting/bathing. Visitors will be screened and may be limited at the discretion of the nurse. Visitor belongings are subject to be searched. Belongings may not be allowed into the patient room. Personal belongings checked by Toni Shrestha RN in the presence of the patient. Belongings believed to be a threat to patient safety removed from room. Room screened for safety, items removed to create a safe environment include:   Blood pressure cuff   Loose or extra cords, tubing (not of medical necessity)   Extra Linens   Telephone   Toiletries   Trash liners   Patient's cell phone and charging cord (must be removed from room)       Safety tray ordered: yes     Expectations were discussed with sitter (unit support aide). Sitter positioned near exit. Sitter reminded that patient should be observed at all times including toileting and bathing. Sitter has security radio and documentation sheet. Patient and sitter included in hourly rounds.

## 2017-08-22 NOTE — PROGRESS NOTES
Nutrition Assessment    Type and Reason for Visit: Reassess (ONS FU)    Nutrition Recommendations: Continue diet and ONS as ordered. Malnutrition Assessment:  · Malnutrition Status: At risk for malnutrition    Nutrition Diagnosis:   · Problem: Increased nutrient needs  · Etiology: related to Acute injury/trauma     Signs and symptoms:  as evidenced by Presence of wounds    Nutrition Assessment:  · Subjective Assessment: Pt. seen; sitter at bedside; great appetite; pt. likes the glucerna but not the Pascual so will discontinue; chemstick 219, A1c 8.1%; pt. bipolar and awaiting placement; pt. states \"I know what to eat for my diabetes I just don't like to do it\"; encouraged healthy selections with extra protein for healing; meds include lantus,humalog,remeron. · Wound Type: Surgical Wound (shoulder closure 7/2017; incision and drainage 8/18/17)  · Current Nutrition Therapies:  · Oral Diet Orders: Carb Control 5 Carbs/Meal   · Oral Diet intake: %  · Oral Nutrition Supplement (ONS) Orders: Diabetic Oral Supplement (Glucerna TID (220 kcals, 10 grams protein/serving) - stopping the BID Pascual per pt. request today)  · ONS intake: % (Glucerna but none of the Pascual)  · Anthropometric Measures:  · Ht: 5' 6\" (167.6 cm)   · Current Body Wt: 211 lb (95.7 kg)  · Admission Body Wt: 211 lb (95.7 kg)  · Usual Body Wt:  (~220# per EHR)  · Ideal Body Wt: 137 lb (62.1 kg) (adjusted for BKA), % Ideal Body 154%  · Adjusted Body Wt: 156 lb (70.8 kg), body weight adjusted for BKA, Obesity  · BMI Classification: BMI 35.0 - 39.9 Obese Class II (35.9 (adjusted for BKA))  · Comparative Standards (Estimated Nutrition Needs):  · Estimated Daily Total Kcal: 4489-2442  · Estimated Daily Protein (g): 107-142    Estimated Intake vs Estimated Needs: Intake Meets Needs    Nutrition Risk Level:  Moderate    Nutrition Interventions:   Continue current diet, Modify current ONS  Continued Inpatient Monitoring, Education Initiated (encouraged healthy menu selections 8/22)    Nutrition Evaluation:   · Evaluation: Progressing toward goals   · Goals: Pt. will consume greater than 75% of meals during LOS    · Monitoring: Meal Intake, Supplement Intake, Diet Tolerance, Skin Integrity, Wound Healing, Weight, Pertinent Labs    See Adult Nutrition Doc Flowsheet for more detail.      Electronically signed by Shira Bean RD, LD on 8/22/17 at 2:31 PM    Contact Number: [phone]

## 2017-08-23 LAB
AEROBIC CULTURE: NORMAL
ANAEROBIC CULTURE: NORMAL
GLUCOSE BLD-MCNC: 172 MG/DL (ref 70–108)
GLUCOSE BLD-MCNC: 187 MG/DL (ref 70–108)
GLUCOSE BLD-MCNC: 217 MG/DL (ref 70–108)
GLUCOSE BLD-MCNC: 237 MG/DL (ref 70–108)
GRAM STAIN RESULT: NORMAL

## 2017-08-23 PROCEDURE — 2580000003 HC RX 258: Performed by: PHYSICIAN ASSISTANT

## 2017-08-23 PROCEDURE — 82948 REAGENT STRIP/BLOOD GLUCOSE: CPT

## 2017-08-23 PROCEDURE — 6370000000 HC RX 637 (ALT 250 FOR IP): Performed by: NURSE PRACTITIONER

## 2017-08-23 PROCEDURE — 6370000000 HC RX 637 (ALT 250 FOR IP): Performed by: PHYSICIAN ASSISTANT

## 2017-08-23 PROCEDURE — 6360000002 HC RX W HCPCS: Performed by: INTERNAL MEDICINE

## 2017-08-23 PROCEDURE — 1200000000 HC SEMI PRIVATE

## 2017-08-23 PROCEDURE — 6370000000 HC RX 637 (ALT 250 FOR IP): Performed by: INTERNAL MEDICINE

## 2017-08-23 RX ORDER — CEPHALEXIN 500 MG/1
500 CAPSULE ORAL EVERY 8 HOURS SCHEDULED
Status: DISCONTINUED | OUTPATIENT
Start: 2017-08-23 | End: 2017-08-24 | Stop reason: HOSPADM

## 2017-08-23 RX ADMIN — Medication 10 ML: at 08:42

## 2017-08-23 RX ADMIN — Medication 15 UNITS: at 13:10

## 2017-08-23 RX ADMIN — Medication 10 ML: at 13:19

## 2017-08-23 RX ADMIN — CEFAZOLIN SODIUM 1 G: 1 INJECTION, SOLUTION INTRAVENOUS at 13:19

## 2017-08-23 RX ADMIN — SERTRALINE 200 MG: 100 TABLET, FILM COATED ORAL at 08:42

## 2017-08-23 RX ADMIN — INSULIN GLARGINE 15 UNITS: 100 INJECTION, SOLUTION SUBCUTANEOUS at 21:44

## 2017-08-23 RX ADMIN — DOXYCYCLINE HYCLATE 100 MG: 100 TABLET, COATED ORAL at 08:42

## 2017-08-23 RX ADMIN — CEPHALEXIN 500 MG: 500 CAPSULE ORAL at 21:43

## 2017-08-23 RX ADMIN — DOXYCYCLINE HYCLATE 100 MG: 100 TABLET, COATED ORAL at 21:43

## 2017-08-23 RX ADMIN — MIRTAZAPINE 15 MG: 15 TABLET, FILM COATED ORAL at 21:43

## 2017-08-23 RX ADMIN — CEFAZOLIN SODIUM 1 G: 1 INJECTION, SOLUTION INTRAVENOUS at 04:26

## 2017-08-23 RX ADMIN — Medication 15 UNITS: at 17:12

## 2017-08-23 RX ADMIN — ARIPIPRAZOLE 15 MG: 15 TABLET ORAL at 08:42

## 2017-08-23 RX ADMIN — Medication 15 UNITS: at 09:26

## 2017-08-23 ASSESSMENT — PAIN SCALES - GENERAL
PAINLEVEL_OUTOF10: 0
PAINLEVEL_OUTOF10: 0

## 2017-08-23 NOTE — PLAN OF CARE
Problem: Nutrition  Goal: Optimal nutrition therapy  Outcome: Ongoing  Nutrition Problem: Inadequate oral intake  Intervention: Food and/or Nutrient Delivery:  (Sart oral diet post-op as able.   RD to start ONS TID once diet is FL or greater)  Nutritional Goals: Patient will receive adequate nutrition in 1-4 days
needs are met  Outcome: Ongoing  Plan is for patient to go to [facility] in [address]   is following on this. Problem: Falls - Risk of  Goal: Absence of falls  Outcome: Ongoing  Patient remains free from falls during shift. Continue with current interventions. Comments:   Care plan reviewed with patient. Patient verbalizes understanding of the plan of care and contributes to goal setting.

## 2017-08-24 ENCOUNTER — HOSPITAL ENCOUNTER (INPATIENT)
Age: 47
LOS: 9 days | Discharge: HOME | DRG: 908 | End: 2017-09-02
Attending: PSYCHIATRY & NEUROLOGY | Admitting: PSYCHIATRY & NEUROLOGY
Payer: MEDICARE

## 2017-08-24 VITALS
HEIGHT: 66 IN | BODY MASS INDEX: 33.91 KG/M2 | WEIGHT: 211 LBS | HEART RATE: 91 BPM | RESPIRATION RATE: 16 BRPM | DIASTOLIC BLOOD PRESSURE: 79 MMHG | TEMPERATURE: 97.9 F | SYSTOLIC BLOOD PRESSURE: 126 MMHG | OXYGEN SATURATION: 95 %

## 2017-08-24 DIAGNOSIS — F31.81 BIPOLAR II DISORDER (HCC): Primary | ICD-10-CM

## 2017-08-24 LAB
GLUCOSE BLD-MCNC: 155 MG/DL (ref 70–108)
GLUCOSE BLD-MCNC: 195 MG/DL (ref 70–108)
GLUCOSE BLD-MCNC: 219 MG/DL (ref 70–108)
GLUCOSE BLD-MCNC: 229 MG/DL (ref 70–108)

## 2017-08-24 PROCEDURE — 6370000000 HC RX 637 (ALT 250 FOR IP): Performed by: PHYSICIAN ASSISTANT

## 2017-08-24 PROCEDURE — 6370000000 HC RX 637 (ALT 250 FOR IP): Performed by: ORTHOPAEDIC SURGERY

## 2017-08-24 PROCEDURE — 82948 REAGENT STRIP/BLOOD GLUCOSE: CPT

## 2017-08-24 PROCEDURE — 6370000000 HC RX 637 (ALT 250 FOR IP): Performed by: INTERNAL MEDICINE

## 2017-08-24 PROCEDURE — 99221 1ST HOSP IP/OBS SF/LOW 40: CPT | Performed by: PSYCHIATRY & NEUROLOGY

## 2017-08-24 PROCEDURE — 6370000000 HC RX 637 (ALT 250 FOR IP): Performed by: NURSE PRACTITIONER

## 2017-08-24 PROCEDURE — 1240000000 HC EMOTIONAL WELLNESS R&B

## 2017-08-24 RX ORDER — INSULIN GLARGINE 100 [IU]/ML
15 INJECTION, SOLUTION SUBCUTANEOUS NIGHTLY
Status: DISCONTINUED | OUTPATIENT
Start: 2017-08-24 | End: 2017-09-02 | Stop reason: HOSPADM

## 2017-08-24 RX ORDER — DOXYCYCLINE HYCLATE 100 MG
100 TABLET ORAL EVERY 12 HOURS SCHEDULED
Status: DISCONTINUED | OUTPATIENT
Start: 2017-08-24 | End: 2017-08-24 | Stop reason: ALTCHOICE

## 2017-08-24 RX ORDER — ARIPIPRAZOLE 10 MG/1
20 TABLET ORAL DAILY
Status: DISCONTINUED | OUTPATIENT
Start: 2017-08-24 | End: 2017-08-24 | Stop reason: HOSPADM

## 2017-08-24 RX ORDER — MIRTAZAPINE 15 MG/1
15 TABLET, FILM COATED ORAL NIGHTLY
Status: CANCELLED | OUTPATIENT
Start: 2017-08-24

## 2017-08-24 RX ORDER — OXYCODONE HYDROCHLORIDE AND ACETAMINOPHEN 5; 325 MG/1; MG/1
1 TABLET ORAL EVERY 4 HOURS PRN
Status: CANCELLED | OUTPATIENT
Start: 2017-08-24

## 2017-08-24 RX ORDER — CEPHALEXIN 500 MG/1
500 CAPSULE ORAL EVERY 8 HOURS SCHEDULED
Status: DISCONTINUED | OUTPATIENT
Start: 2017-08-24 | End: 2017-09-02 | Stop reason: HOSPADM

## 2017-08-24 RX ORDER — MIRTAZAPINE 15 MG/1
15 TABLET, FILM COATED ORAL NIGHTLY
Status: DISCONTINUED | OUTPATIENT
Start: 2017-08-24 | End: 2017-09-02 | Stop reason: HOSPADM

## 2017-08-24 RX ORDER — INSULIN GLARGINE 100 [IU]/ML
15 INJECTION, SOLUTION SUBCUTANEOUS NIGHTLY
Status: CANCELLED | OUTPATIENT
Start: 2017-08-24

## 2017-08-24 RX ORDER — ACETAMINOPHEN 325 MG/1
650 TABLET ORAL EVERY 4 HOURS PRN
Status: DISCONTINUED | OUTPATIENT
Start: 2017-08-24 | End: 2017-09-02 | Stop reason: HOSPADM

## 2017-08-24 RX ORDER — OXYCODONE HYDROCHLORIDE AND ACETAMINOPHEN 5; 325 MG/1; MG/1
1 TABLET ORAL EVERY 4 HOURS PRN
Status: DISCONTINUED | OUTPATIENT
Start: 2017-08-24 | End: 2017-09-02 | Stop reason: HOSPADM

## 2017-08-24 RX ORDER — ARIPIPRAZOLE 15 MG/1
15 TABLET ORAL DAILY
Status: CANCELLED | OUTPATIENT
Start: 2017-08-24

## 2017-08-24 RX ORDER — CEPHALEXIN 500 MG/1
500 CAPSULE ORAL EVERY 8 HOURS SCHEDULED
Status: CANCELLED | OUTPATIENT
Start: 2017-08-24 | End: 2017-09-21

## 2017-08-24 RX ORDER — OXYCODONE HYDROCHLORIDE AND ACETAMINOPHEN 5; 325 MG/1; MG/1
2 TABLET ORAL EVERY 4 HOURS PRN
Status: DISCONTINUED | OUTPATIENT
Start: 2017-08-24 | End: 2017-09-02 | Stop reason: HOSPADM

## 2017-08-24 RX ORDER — DOXYCYCLINE HYCLATE 100 MG
100 TABLET ORAL EVERY 12 HOURS SCHEDULED
Status: CANCELLED | OUTPATIENT
Start: 2017-08-24

## 2017-08-24 RX ORDER — OXYCODONE HYDROCHLORIDE AND ACETAMINOPHEN 5; 325 MG/1; MG/1
2 TABLET ORAL EVERY 4 HOURS PRN
Status: CANCELLED | OUTPATIENT
Start: 2017-08-24

## 2017-08-24 RX ORDER — ACETAMINOPHEN 325 MG/1
650 TABLET ORAL EVERY 4 HOURS PRN
Status: CANCELLED | OUTPATIENT
Start: 2017-08-24

## 2017-08-24 RX ORDER — SERTRALINE HYDROCHLORIDE 100 MG/1
200 TABLET, FILM COATED ORAL DAILY
Status: CANCELLED | OUTPATIENT
Start: 2017-08-25

## 2017-08-24 RX ORDER — SERTRALINE HYDROCHLORIDE 100 MG/1
200 TABLET, FILM COATED ORAL DAILY
Status: DISCONTINUED | OUTPATIENT
Start: 2017-08-25 | End: 2017-09-02 | Stop reason: HOSPADM

## 2017-08-24 RX ADMIN — DOXYCYCLINE HYCLATE 100 MG: 100 TABLET, COATED ORAL at 09:15

## 2017-08-24 RX ADMIN — INSULIN GLARGINE 15 UNITS: 100 INJECTION, SOLUTION SUBCUTANEOUS at 21:39

## 2017-08-24 RX ADMIN — MIRTAZAPINE 15 MG: 15 TABLET, FILM COATED ORAL at 21:34

## 2017-08-24 RX ADMIN — SERTRALINE 200 MG: 100 TABLET, FILM COATED ORAL at 09:15

## 2017-08-24 RX ADMIN — Medication 15 UNITS: at 09:15

## 2017-08-24 RX ADMIN — CEPHALEXIN 500 MG: 500 CAPSULE ORAL at 21:34

## 2017-08-24 RX ADMIN — Medication 15 UNITS: at 12:58

## 2017-08-24 RX ADMIN — CEPHALEXIN 500 MG: 500 CAPSULE ORAL at 13:00

## 2017-08-24 RX ADMIN — Medication 15 UNITS: at 18:52

## 2017-08-24 RX ADMIN — CEPHALEXIN 500 MG: 500 CAPSULE ORAL at 05:23

## 2017-08-24 ASSESSMENT — PATIENT HEALTH QUESTIONNAIRE - PHQ9: SUM OF ALL RESPONSES TO PHQ QUESTIONS 1-9: 18

## 2017-08-24 ASSESSMENT — SLEEP AND FATIGUE QUESTIONNAIRES
DIFFICULTY ARISING: YES
SLEEP PATTERN: DIFFICULTY FALLING ASLEEP;DISTURBED/INTERRUPTED SLEEP
DO YOU HAVE DIFFICULTY SLEEPING: YES
DIFFICULTY FALLING ASLEEP: YES
DIFFICULTY STAYING ASLEEP: YES
DO YOU USE A SLEEP AID: YES
RESTFUL SLEEP: NO
AVERAGE NUMBER OF SLEEP HOURS: 4

## 2017-08-24 ASSESSMENT — LIFESTYLE VARIABLES: HISTORY_ALCOHOL_USE: NO

## 2017-08-24 ASSESSMENT — PAIN SCALES - GENERAL: PAINLEVEL_OUTOF10: 0

## 2017-08-24 NOTE — PROGRESS NOTES
Progress note: Infectious diseases    Patient - Soheila Madrid,  Age - 50 y.o.    - 1970      Room Number - 7K-25/025-A   MRN -  [id number]   Acct # - [de-identified]  Date of Admission -  2017  5:01 PM    SUBJECTIVE:   No new complaints. OBJECTIVE   VITALS    height is 5' 6\" (1.676 m) and weight is 211 lb (95.7 kg). His oral temperature is 97.9 °F (36.6 °C). His blood pressure is 126/79 and his pulse is 91. His respiration is 16 and oxygen saturation is 95%. Wt Readings from Last 3 Encounters:   17 211 lb (95.7 kg)   03/15/17 220 lb 0.3 oz (99.8 kg)   17 220 lb 0.3 oz (99.8 kg)       I/O (24 Hours)    Intake/Output Summary (Last 24 hours) at 17 1307  Last data filed at 17 8095   Gross per 24 hour   Intake             2860 ml   Output                2 ml   Net             2858 ml       General Appearance  Awake, alert, oriented,  not  In acute distress  HEENT - normocephalic, atraumatic, pink conjunctiva,  anicteric sclera  Neck - Supple, no mass  Lungs -  Bilateral good air entry, no rhonchi, no wheeze  Cardiovascular - Heart sounds are normal.   Abdomen - soft, not distended, nontender,   Neurologic -Oriented  Skin - No bruising or bleeding  Extremities - Left shoulder wound is clean and no drainage was noted.     MEDICATIONS:      cephALEXin  500 mg Oral 3 times per day    ARIPiprazole  15 mg Oral Daily    doxycycline hyclate  100 mg Oral 2 times per day    sodium chloride flush  10 mL Intravenous 2 times per day    insulin glargine  15 Units Subcutaneous Nightly    insulin lispro  15 Units Subcutaneous TID AC    mirtazapine  15 mg Oral Nightly    sertraline  200 mg Oral Daily      dextrose      sodium chloride 100 mL/hr at 17 1217     glucose, dextrose, glucagon (rDNA), dextrose, diphenhydrAMINE, sodium chloride flush, acetaminophen, magnesium hydroxide, ondansetron,

## 2017-08-24 NOTE — IP AVS SNAPSHOT
After Visit Summary    This summary was created for you. Thank you for entrusting your care to us. The following information includes details about your hospital/visit stay along with steps you should take to help with your recovery once you leave the hospital.  In this packet, you will find information about the topics listed below:    · Instructions about your medications including a list of your home medications  · A summary of your hospital visit  · Follow-up appointments once you have left the hospital  · Your care plan at home      You may receive a survey regarding the care you received during your stay. Your input is valuable to us. We encourage you to complete and return your survey in the envelope provided. We hope you will choose us in the future for your healthcare needs. Basic Information     Date Of Birth Sex Race Ethnicity Preferred Language Preferred Written Language    1970 Male White Non-/Non  English English      Vital Signs     Blood Pressure Pulse Temperature Respirations Height Weight    121/59 98 97.9 °F (36.6 °C) (Oral) 18 5' 6\" (1.676 m) 229 lb (103.9 kg)    Oxygen Saturation Body Mass Index Smoking Status             98% 36.96 kg/m2 Former Smoker         Care Provided at:     Name Address Phone       [address] [phone]       Psychiatric Advance Directive          Most Recent Value    Psychiatric Advance Directive  No    Power of  for Health Care             Your Visit    Here you will find information about your visit, including the reason for your visit. Please take this sheet with you when you visit your doctor or other health care provider in the future. It will help determine the best possible medical care for you at that time. If you have any questions once you leave the hospital, please call the department phone number listed below. ? If you were given an oral nutrition supplement while in the hospital, continue to take this supplement at home. You can take it with meals, in-between meals, and/or before bedtime. These supplements can be purchased at most local grocery stores, pharmacies, and chain super-stores. ? If you have any questions about your diet or nutrition, call the hospital and ask for the dietitian. Activity Instructions     As tolerated             Discharge Instructions       Keep all follow-up appointments and take medications as directed. Call the hope line if needed at :  [facility], and [facility] [phone]. [facility] and [place] [phone]. [facility] [phone]. [facility] [place] [address] [phone]. Roxana Lemus, [employer], and [place] [phone]    Symptoms to report to your Doctor:  Depression  Inability to eat, sleep, or have a bowel movement  Increased sleepiness and lethargy  Voices in your head  Any thoughts of harming self or others    Things to avoid:  Caffeine  Alcohol  No street drugs  Over the counter medications unless Ok'd by your physician or pharmacist.  Driving or operating machinery until full effects of your medications are known. Driving or operating machinery if dizzy or drowsy from medications. Use journal as directed. Education:  Illness and medication teaching was completed. Discharge Disposition: Patient was discharged to shelter and was transported by cab. Patient was accompanied by self.       Information sent to next level of care:    ____Admission orders to [facility] [address]    __x__Discharge instructions    ____Behavioral Services Assessment    ____Hand off Summary    ____History and Physical    ____Last dose MAR    ____Patient transfer form    ____Other         Important Information    If your condition worsens or if you have any concerns, call your doctor or 9. Click Sign Up. You can now view your medical record. Additional Information  If you have questions, please contact the physician practice where you receive care. Remember, [employer] is NOT to be used for urgent needs. For medical emergencies, dial 911. For questions regarding your [employer] account call [phone]. If you have a clinical question, please call your doctor's office. View your information online  ? Review your current list of  medications, immunization, and allergies. ? Review your future test results online . ? Review your discharge instructions provided by your caregivers at discharge    Certain functionality such as prescription refills, scheduling appointments or sending messages to your provider are not activated if your provider does not use [employer] in his/her office    For questions regarding your [employer] account call [phone]. If you have a clinical question, please call your doctor's office.

## 2017-08-24 NOTE — PROGRESS NOTES
Report called to [facility] RN on 4E, awaiting transport to arrive, patient to be escorted by security and floor staff member.

## 2017-08-25 LAB
EKG ATRIAL RATE: 85 BPM
EKG P AXIS: 35 DEGREES
EKG P-R INTERVAL: 166 MS
EKG Q-T INTERVAL: 384 MS
EKG QRS DURATION: 80 MS
EKG QTC CALCULATION (BAZETT): 456 MS
EKG R AXIS: 4 DEGREES
EKG T AXIS: 35 DEGREES
EKG VENTRICULAR RATE: 85 BPM
GLUCOSE BLD-MCNC: 126 MG/DL (ref 70–108)
GLUCOSE BLD-MCNC: 173 MG/DL (ref 70–108)
GLUCOSE BLD-MCNC: 207 MG/DL (ref 70–108)
GLUCOSE BLD-MCNC: 238 MG/DL (ref 70–108)

## 2017-08-25 PROCEDURE — 99231 SBSQ HOSP IP/OBS SF/LOW 25: CPT | Performed by: PSYCHIATRY & NEUROLOGY

## 2017-08-25 PROCEDURE — 1240000000 HC EMOTIONAL WELLNESS R&B

## 2017-08-25 PROCEDURE — 6370000000 HC RX 637 (ALT 250 FOR IP): Performed by: ORTHOPAEDIC SURGERY

## 2017-08-25 PROCEDURE — 97165 OT EVAL LOW COMPLEX 30 MIN: CPT

## 2017-08-25 PROCEDURE — 82948 REAGENT STRIP/BLOOD GLUCOSE: CPT

## 2017-08-25 RX ADMIN — MIRTAZAPINE 15 MG: 15 TABLET, FILM COATED ORAL at 21:09

## 2017-08-25 RX ADMIN — CEPHALEXIN 500 MG: 500 CAPSULE ORAL at 15:19

## 2017-08-25 RX ADMIN — Medication 15 UNITS: at 12:06

## 2017-08-25 RX ADMIN — CEPHALEXIN 500 MG: 500 CAPSULE ORAL at 06:39

## 2017-08-25 RX ADMIN — SERTRALINE 200 MG: 100 TABLET, FILM COATED ORAL at 09:44

## 2017-08-25 RX ADMIN — Medication 15 UNITS: at 09:45

## 2017-08-25 RX ADMIN — CEPHALEXIN 500 MG: 500 CAPSULE ORAL at 21:09

## 2017-08-25 RX ADMIN — INSULIN GLARGINE 15 UNITS: 100 INJECTION, SOLUTION SUBCUTANEOUS at 21:09

## 2017-08-25 ASSESSMENT — LIFESTYLE VARIABLES: HISTORY_ALCOHOL_USE: YES

## 2017-08-25 NOTE — PATIENT CARE CONFERENCE
[facility]  Initial Interdisciplinary Treatment Plan NOTE    Review Date & Time:  August 25, 2017    Patient was in treatment team    Admission Type:   Admission Type: Voluntary    Reason for admission:  Reason for Admission: suicidal ideation      Estimated Length of Stay Update:  3-5 Days  Estimated Discharge Date Update: TBD    PATIENT STRENGTHS:  Patient Strengths Strengths: Positive Support, Connection to output provider  Patient Strengths and Limitations:Limitations: Lacks leisure interests, Inappropriate/potentially harmful leisure interests  Addictive Behavior:Addictive Behavior  In the past 3 months, have you felt or has someone told you that you have a problem with:  : None  Do you have a history of Chemical Use?: No  Do you have a history of Alcohol Use?: No  Do you have a history of Street Drug Abuse?: No  Histroy of Prescripton Drug Abuse?: No  Medical Problems:  Past Medical History:   Diagnosis Date    Bipolar 1 disorder ([facility] [address]     follows with therapist Levi Silver    Diabetes [facility])     dx  age 20- follows with Dr Jerry Traylor Diabetes mellitus ([facility] [address]     Diabetic foot ulcer ([facility] [address]     Diabetic ulcer of right foot associated with type 2 diabetes mellitus ([facility] [address] 12/10/2015    Heart murmur     denies any chest pain or palpitations    Hypertension     \"never been on b/p medication that I know of\"    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 What is the most important thing that you need help with while you are here? \"Getting my mind right. \"    2. Who is your support system? Friends  3. Where will you be residing when you leave the hospital? Patient plans on getting transferred to [facility]. 4. Do you have follow-up providers?   Patient does have service with [facility], but will be looking for something in [place]. PLAN/TREATMENT RECOMMENDATIONS UPDATE: Psychiatry Services, Medication Management, Group Therapy, Group Psychotherapy, Nursing Education Groups, and Recreational Therapy.       SHORT-TERM GOALS UPDATE:   Time frame for Short-Term Goals: Daily and Ongoing    LONG-TERM GOALS UPDATE:   Time frame for Long-Term Goals: Ongoing  Members Present in Team Meeting: See Signature Sheet      Nino Beauchamp [facility]

## 2017-08-25 NOTE — BH NOTE
BHI Biopsychosocial Assessment    Current Level of Psychosocial Functioning     Independent XX  Dependent    Minimal Assist     Comments:      Psychosocial High Risk Factors (check all that apply)    Unable to obtain meds   Chronic illness/pain    Substance abuse   Lack of Family Support   Financial stress   Isolation   Inadequate Community Resources  Suicide attempt(s)  Not taking medications   Victim of crime   Developmental Delay  Unable to manage personal needs    Age 72 or older   Homeless XX  No transportation XX  Readmission within 30 days XX  Unemployment  Traumatic Event    Comments:   Sexual Orientation: Heterosexual    Patient Strengths: Patient is able to verbalize treatment options and patient is motivated. Patient Barriers: Patient is homeless at this time. Plan of Care: Medication management, group/individual therapies, family meetings, psycho -education, treatment team meetings to assist with stabilization    Initial Discharge Plan: Patient plans to get [facility] to [facility] in [place] (Transfer date to be determined but this clinician was informed that this may happen tomorrow, 08/26/2017). Clinical Summary:  Patient is a 50 y.o., single,  male, that currently is homeless at this time. Patient was transferred to the unit from the 7th floor after having had surgery on her shoulder. Patient had a big abscess in July and he had surgery to remove it, but then the abscess came back, and then the patient had another surgery because it was still infected. Patient reports that he was having suicidal thoughts while on the 7th floor and he reports that he is just \"tired of my health. \"  Patient reports that he is going to go to college.   Patient is very frustrated with his ongoing medical issues, with the loss of his mom a couple of years ago, problems with past legal issues still affecting his life today, as well as being homeless. Patient does wish to go back to school to get himself focused on his future. Patient does report feeling sad and depressed, having thoughts of suicide, having problems with feelings of hopelessness and worthlessness, and lack of energy. Patient denies problems with sleep and/or eating. Patient does not appear to have problems with concentration. Patient denies hallucinations and does not appear to be responding to any external stimuli. Patient does not present as delusional.  Patient's facial expressions are sad/flat and his affect is constricted. Patient's thoughts are circumstantial in nature.

## 2017-08-25 NOTE — BH NOTE
INPATIENT RECREATIONAL THERAPY  ADULT BEHAVIORAL SERVICES  EVALUATION    REFERRING PHYSICIAN:   Dr. Gabby Diallo  DIAGNOSIS:    Suicidal Ideation  PRECAUTIONS:  suicide precautions    HISTORY OF PRESENT ILLNESS/INJURY:  Patient was admitted to the unit due to suicidal ideation. Patient reported that he was having thoughts of cutting his wrists prior to admission. Patient stated that he has left shoulder pain from having an abscess after surgery. Patient also reported financial stress. Patient reported that he is also  starting at [employer] [facility] [place]. Patient was cooperative and pleasant at time of evaluation. PMH:  Please see medical chart for prior medical history, allergies, and medication    HISTORY OF PSYCHIATRIC TREATMENT:  IP: [facility]  OP:  PCP  DATE OF BIRTH:   [birth year]  GENDER:   male  MARITAL STATUS:       EMPLOYMENT STATUS:  disability  LIVING SITUATION/SUPPORT:  lives alone  EDUCATIONAL LEVEL:  Some college. Patient also reported that he is beginning college at [employer]. MEDICATION/DRUG USE:  History of noncompliance with medications. LEISURE INTERESTS:   Coloring and other arts/crafts, listening to [employer], activities with friends, reading, spiritual activities, watching TV/Movies, pet care  ACTIVITY PREFERENCE:  No preference  ACTIVITY TYPES:   Passive. Active. Indoor. Outdoor. COGNITION:  A&Ox4    COPING:  poor  ATTENTION:  fair  RELAXATION:   Appeared calm. SELF-ESTEEM:  poor  MOTIVATION:   fair    SOCIAL SKILLS:   Fair - guiarded  FRUSTRATION TOLERANCE:   No violent behavior noted in chart.   ATTENTION SEEKING:  None noted  COOPERATION:  cooperative  AFFECT:  flat  APPEARANCE:  appropriate    HEARING:   no problems noted  VISION:   No problems  VERBAL COMMUNICATION:  guarded  WRITTEN COMMUNICATION:   No problems noted    COORDINATION:   No problems noted   MOBILITY:  Ambulates independently   GOALS:   Increase socialization by attending at least 3 groups on the unit

## 2017-08-25 NOTE — H&P
mixture of everything ranging from his health issues to financial issues and to his living condition. Reported sad feelings, constant thought to hurt by slicing wrist, anhedonia, low energy, no motivation, too much sleep and hopelessness. Reported that he takes all his medications as ordered but does not think that they are working well for him. Hx of mood swings but he denies any hallucinations or delusions. He denies any PTSD or OCD. He reported that he is currently suicidal and will harm self when he leaves here. Patient however is pleasant, smiling and making jokes, therefore mood is incongruent.      On arrival to  :  Patient seen today and continues to state that he is depressed and still suicidal with plan to cut self with knife. Patient stating that he is sad because of his life's situation--is financially not okay and does not have any place to live. He reports that he is not sleeping as well, is taking his medications and does not feel that they are helping him. Patient is smiling as always--incongruent mood. He terra any hallucinations.        OBJECTIVE    Physical  /69  Pulse 101  Temp 97.7 °F (36.5 °C) (Oral)   Resp 22  SpO2 100%    Mental Status Examination:  Level of consciousness:  within normal limits  Appearance:  hospital attire, in chair, fair grooming and fair hygiene  Behavior/Motor:  no abnormalities noted  Attitude toward examiner:  cooperative, attentive and good eye contact  Speech:  normal rate, normal volume and well articulated  Mood:  Euthymic but says he is sad  Affect:  REactive  Thought processes:  coherent  Thought content:  Homocidal ideation denies  Suicidal Ideation:  active and with plan to cut  Delusions:  no evidence of delusions  Perceptual Disturbance:  denies any perceptual disturbance  Cognition:  oriented to person, place, and time  Concentration succeeded  Memory intact  IJudgment/Insight: poor     Medications  Current Facility-Administered Medications: acetaminophen (TYLENOL) tablet 650 mg, 650 mg, Oral, Q4H PRN  magnesium hydroxide (MILK OF MAGNESIA) 400 MG/5ML suspension 30 mL, 30 mL, Oral, Daily PRN  cephALEXin (KEFLEX) capsule 500 mg, 500 mg, Oral, 3 times per day  insulin glargine (LANTUS) injection vial 15 Units, 15 Units, Subcutaneous, Nightly  insulin lispro (HUMALOG) injection vial 15 Units, 15 Units, Subcutaneous, TID AC  mirtazapine (REMERON) tablet 15 mg, 15 mg, Oral, Nightly  oxyCODONE-acetaminophen (PERCOCET) 5-325 MG per tablet 1 tablet, 1 tablet, Oral, Q4H PRN  oxyCODONE-acetaminophen (PERCOCET) 5-325 MG per tablet 2 tablet, 2 tablet, Oral, Q4H PRN  [START ON 8/25/2017] sertraline (ZOLOFT) tablet 200 mg, 200 mg, Oral, Daily    ASSESSMENT  Bipolar II disorder; depressed episode    PLAN  Continue with present medications as adjusted while he was on medical floor  (Abilfy to 20 mg daily)  Encouraged to attend groups    Time Spent:  30 minutes    Physicians Signature:  Electronically signed by Gianni Kwong MD

## 2017-08-26 LAB
GLUCOSE BLD-MCNC: 203 MG/DL (ref 70–108)
GLUCOSE BLD-MCNC: 203 MG/DL (ref 70–108)
GLUCOSE BLD-MCNC: 206 MG/DL (ref 70–108)
GLUCOSE BLD-MCNC: 241 MG/DL (ref 70–108)

## 2017-08-26 PROCEDURE — 99231 SBSQ HOSP IP/OBS SF/LOW 25: CPT | Performed by: NURSE PRACTITIONER

## 2017-08-26 PROCEDURE — 1240000000 HC EMOTIONAL WELLNESS R&B

## 2017-08-26 PROCEDURE — 82948 REAGENT STRIP/BLOOD GLUCOSE: CPT

## 2017-08-26 PROCEDURE — 6370000000 HC RX 637 (ALT 250 FOR IP): Performed by: ORTHOPAEDIC SURGERY

## 2017-08-26 RX ADMIN — MIRTAZAPINE 15 MG: 15 TABLET, FILM COATED ORAL at 20:40

## 2017-08-26 RX ADMIN — INSULIN GLARGINE 15 UNITS: 100 INJECTION, SOLUTION SUBCUTANEOUS at 20:40

## 2017-08-26 RX ADMIN — Medication 15 UNITS: at 08:30

## 2017-08-26 RX ADMIN — CEPHALEXIN 500 MG: 500 CAPSULE ORAL at 13:40

## 2017-08-26 RX ADMIN — CEPHALEXIN 500 MG: 500 CAPSULE ORAL at 20:40

## 2017-08-26 RX ADMIN — Medication 15 UNITS: at 12:42

## 2017-08-26 RX ADMIN — SERTRALINE 200 MG: 100 TABLET, FILM COATED ORAL at 08:30

## 2017-08-26 RX ADMIN — CEPHALEXIN 500 MG: 500 CAPSULE ORAL at 06:21

## 2017-08-26 ASSESSMENT — PAIN SCALES - GENERAL: PAINLEVEL_OUTOF10: 0

## 2017-08-26 NOTE — PLAN OF CARE
planning  Outcome: Met This Shift  Patient is compliant with medications, unit protocols, and setting daily goals for improved mental and emotional health. Problem: Suicide risk  Goal: Provide patient with safe environment  Provide patient with safe environment   Outcome: Ongoing  Patient is in safe secured environment. He contracts to not act out on self harm but having passive suicidal thoughts no plan and or intent to act out on same. See above. Problem: Discharge Planning:  Goal: Discharged to appropriate level of care  Discharged to appropriate level of care   Outcome: Ongoing  Working with multidisciplinary treatment team for ongoing continuity of care and relapse prevention. Problem: Serum Glucose Level - Abnormal:  Goal: Ability to maintain appropriate glucose levels will improve  Ability to maintain appropriate glucose levels will improve   Outcome: Ongoing  Chem stick this am was 203. Problem: Falls - Risk of:  Goal: Will remain free from falls  Will remain free from falls   Outcome: Ongoing  Patient's gait is steady and slow and free of falls this shift and will continue to monitor. Problem: KNOWLEDGE DEFICIT  Goal: Knowledge - personal safety  Outcome: Ongoing  Patient is working toward completing their safety plan and their recovery program and resiliency. Problem: Anxiety:  Goal: Level of anxiety will decrease  Level of anxiety will decrease   Outcome: Ongoing  He is able to watch TV and distract self from anxiety. Problem: Skin Integrity:  Goal: Will show no infection signs and symptoms  Will show no infection signs and symptoms   Outcome: Ongoing  No signs or symptoms of infection, incision has small amount of drainage, no odor or infection noted. Problem: Nutrition  Goal: Optimal nutrition therapy  Outcome: Met This Shift  HE drank 360 cc and ate 100% of breakfast this am.    Comments:   Care plan reviewed with patient.   Patient verbalized understanding of the plan of care and contribute to goal setting.

## 2017-08-26 NOTE — PLAN OF CARE
Anxiety:  Goal: Level of anxiety will decrease  Level of anxiety will decrease   Outcome: Not Met This Shift  states he is feeling anxious this shift    Problem: Skin Integrity:  Goal: Will show no infection signs and symptoms  Will show no infection signs and symptoms   Outcome: Ongoing  Incision as scant amount of drainage with no odor    Problem: Nutrition  Goal: Optimal nutrition therapy  Outcome: Not Met This Shift

## 2017-08-26 NOTE — BH NOTE
Pt did not attend 0900 community meeting and goal group. Pt attended briefly but asked to meet with Nurse Practitioner. Pt did not attend 0930 healthy living and wellness group. Pt offered daily goal in 1:1 session:  \"To get my mind right\"

## 2017-08-26 NOTE — PROGRESS NOTES
Group Therapy Note    Date: 8/26/2017  Start Time: 2:30p  End Time:  3:00p  Number of Participants: 5    Type of Group: Relaxation    Wellness Binder Information  Module Name:    Session Number:      Patient's Goal:  Pt. Couldn't identify and goals     Notes:  Pt. Reports he is very tired     Status After Intervention:  Unchanged    Participation Level:  Active Listener    Participation Quality: Appropriate, Attentive and Supportive      Speech:  Clear      Thought Process/Content: Logical      Affective Functioning: Congruent      Mood: euphoric      Level of consciousness:  Drowsy      Response to Learning: Able to verbalize current knowledge/experience, Able to verbalize/acknowledge new learning, Able to retain information and Capable of insight      Endings: None Reported    Modes of Intervention: Socialization and Exploration      Discipline Responsible: /Counselor      Signature:  NEFTALI Betts

## 2017-08-26 NOTE — PROGRESS NOTES
Samantha Lopez PA-C from Dr. Hassie Kayser staff here to assess patient earlier in shift. Assessed patient's incision site and was not concerned at this point regarding the drainage. No new orders at this time.

## 2017-08-27 LAB
GLUCOSE BLD-MCNC: 188 MG/DL (ref 70–108)
GLUCOSE BLD-MCNC: 203 MG/DL (ref 70–108)
GLUCOSE BLD-MCNC: 224 MG/DL (ref 70–108)
GLUCOSE BLD-MCNC: 230 MG/DL (ref 70–108)

## 2017-08-27 PROCEDURE — 6370000000 HC RX 637 (ALT 250 FOR IP): Performed by: ORTHOPAEDIC SURGERY

## 2017-08-27 PROCEDURE — 1240000000 HC EMOTIONAL WELLNESS R&B

## 2017-08-27 PROCEDURE — 82948 REAGENT STRIP/BLOOD GLUCOSE: CPT

## 2017-08-27 RX ADMIN — CEPHALEXIN 500 MG: 500 CAPSULE ORAL at 06:50

## 2017-08-27 RX ADMIN — Medication 15 UNITS: at 12:00

## 2017-08-27 RX ADMIN — CEPHALEXIN 500 MG: 500 CAPSULE ORAL at 13:44

## 2017-08-27 RX ADMIN — OXYCODONE HYDROCHLORIDE AND ACETAMINOPHEN 2 TABLET: 5; 325 TABLET ORAL at 21:16

## 2017-08-27 RX ADMIN — INSULIN GLARGINE 15 UNITS: 100 INJECTION, SOLUTION SUBCUTANEOUS at 21:12

## 2017-08-27 RX ADMIN — CEPHALEXIN 500 MG: 500 CAPSULE ORAL at 21:11

## 2017-08-27 RX ADMIN — Medication 15 UNITS: at 17:15

## 2017-08-27 RX ADMIN — MIRTAZAPINE 15 MG: 15 TABLET, FILM COATED ORAL at 21:11

## 2017-08-27 RX ADMIN — Medication 15 UNITS: at 08:21

## 2017-08-27 RX ADMIN — SERTRALINE 200 MG: 100 TABLET, FILM COATED ORAL at 08:21

## 2017-08-27 ASSESSMENT — PAIN SCALES - GENERAL
PAINLEVEL_OUTOF10: 8
PAINLEVEL_OUTOF10: 0
PAINLEVEL_OUTOF10: 8

## 2017-08-27 ASSESSMENT — PAIN DESCRIPTION - FREQUENCY: FREQUENCY: CONTINUOUS

## 2017-08-27 ASSESSMENT — PAIN DESCRIPTION - LOCATION: LOCATION: SHOULDER

## 2017-08-27 ASSESSMENT — PAIN DESCRIPTION - PAIN TYPE: TYPE: CHRONIC PAIN

## 2017-08-27 ASSESSMENT — PAIN DESCRIPTION - PROGRESSION: CLINICAL_PROGRESSION: NOT CHANGED

## 2017-08-27 ASSESSMENT — PAIN DESCRIPTION - ORIENTATION: ORIENTATION: LEFT

## 2017-08-27 ASSESSMENT — PAIN DESCRIPTION - ONSET: ONSET: ON-GOING

## 2017-08-27 ASSESSMENT — PAIN DESCRIPTION - DESCRIPTORS: DESCRIPTORS: ACHING;DISCOMFORT

## 2017-08-27 NOTE — BH NOTE
Group Therapy Note    Date: 8/27/2017  Start Time: 0930  End Time:  1000  Number of Participants: 7    Type of Group: Healthy Living/Wellness    Wellness Binder Information  Module Name:  N/a  Session Number:  N/a    Patient's Goal:  Pt to identify at least 3 positive self-care activities to utilize as coping mechanisms and to increase leisure awareness. Notes:  Pt guarded during group but engaged more appropriately during this group than previous group. Pt able to identify 1 self-care activity. Status After Intervention:  Improved    Participation Level:  Active Listener and Interactive    Participation Quality: Appropriate, Attentive and Sharing      Speech:  normal and hesitant      Thought Process/Content: Logical      Affective Functioning: Flat      Mood: depressed      Level of consciousness:  Alert, Oriented x4 and Attentive      Response to Learning: Able to verbalize current knowledge/experience, Able to retain information, Capable of insight and Progressing to goal      Endings: None Reported    Modes of Intervention: Education, Support, Socialization, Exploration and Activity      Discipline Responsible: Psychoeducational Specialist      Signature:  Justin Guevara

## 2017-08-27 NOTE — BH NOTE
Group Therapy Note    Date: 8/27/2017  Start Time: 1500  End Time:  0174  Number of Participants: 15    Type of Group: Healthy Living/Wellness    Status After Intervention:  Improved    Participation Level:  Active Listener and Interactive    Participation Quality: Appropriate, Attentive, Sharing and Supportive      Speech:  normal      Thought Process/Content: Logical      Affective Functioning: Congruent and Flat      Mood: anxious and depressed      Level of consciousness:  Alert and Attentive      Response to Learning: Able to verbalize current knowledge/experience, Able to verbalize/acknowledge new learning, Able to retain information, Capable of insight, Able to change behavior and Progressing to goal      Endings: None Reported    Modes of Intervention: Education, Support, Exploration, Clarifying and Problem-solving      Discipline Responsible: Registered Nurse      Signature:  Mark Frias RN

## 2017-08-28 LAB
GLUCOSE BLD-MCNC: 192 MG/DL (ref 70–108)
GLUCOSE BLD-MCNC: 195 MG/DL (ref 70–108)
GLUCOSE BLD-MCNC: 221 MG/DL (ref 70–108)
GLUCOSE BLD-MCNC: 274 MG/DL (ref 70–108)

## 2017-08-28 PROCEDURE — 6370000000 HC RX 637 (ALT 250 FOR IP): Performed by: ORTHOPAEDIC SURGERY

## 2017-08-28 PROCEDURE — 99231 SBSQ HOSP IP/OBS SF/LOW 25: CPT | Performed by: PSYCHIATRY & NEUROLOGY

## 2017-08-28 PROCEDURE — 97110 THERAPEUTIC EXERCISES: CPT

## 2017-08-28 PROCEDURE — 82948 REAGENT STRIP/BLOOD GLUCOSE: CPT

## 2017-08-28 PROCEDURE — 1240000000 HC EMOTIONAL WELLNESS R&B

## 2017-08-28 RX ADMIN — CEPHALEXIN 500 MG: 500 CAPSULE ORAL at 20:48

## 2017-08-28 RX ADMIN — MIRTAZAPINE 15 MG: 15 TABLET, FILM COATED ORAL at 20:47

## 2017-08-28 RX ADMIN — Medication 15 UNITS: at 08:59

## 2017-08-28 RX ADMIN — Medication 15 UNITS: at 11:44

## 2017-08-28 RX ADMIN — CEPHALEXIN 500 MG: 500 CAPSULE ORAL at 15:16

## 2017-08-28 RX ADMIN — CEPHALEXIN 500 MG: 500 CAPSULE ORAL at 06:45

## 2017-08-28 RX ADMIN — INSULIN GLARGINE 15 UNITS: 100 INJECTION, SOLUTION SUBCUTANEOUS at 20:48

## 2017-08-28 RX ADMIN — SERTRALINE 200 MG: 100 TABLET, FILM COATED ORAL at 08:59

## 2017-08-28 NOTE — PROGRESS NOTES
Group Therapy Note    Date: 8/28/2017  Start Time: 10:00  End Time:  10:45  Number of Participants: 6    Type of Group: Psychotherapy    Wellness Binder Information  Module Name:    Session Number:    Patient's Goal:  To engage in the group process and identify healthy relationships. Notes:  Patient was engaged in the group process and identified the need to continue care for himself and improve communication. Status After Intervention:  Improved    Participation Level:  Active Listener and Interactive    Participation Quality: Appropriate, Attentive, Sharing and Supportive      Speech:  normal      Thought Process/Content: Logical      Affective Functioning: Congruent      Mood: euthymic      Level of consciousness:  Alert and Oriented x4      Response to Learning: Able to verbalize current knowledge/experience      Endings: None Reported    Modes of Intervention: Support, Socialization, Exploration, Clarifying and Problem-solving      Discipline Responsible: /Counselor      Signature:  NEFTALI Knowles

## 2017-08-28 NOTE — PROGRESS NOTES
ok'ed to leave HEP handout.  Suicidal precautions remained in place at [facility].)    Plan:  Times per week: 5x  Current Treatment Recommendations: ROM, Self-Care / ADL, Safety Education & Training    Goals:  Patient goals : decrease pain & stiffness LUE  Short term goals  Time Frame for Short term goals: 1 week  Short term goal 1: Complete supine dowel HEP with min vcs for technique to increase AROM L shoulder flexion >50 degrees  Short term goal 2: Complete UB dressing with adaptative strategies prn to increase ease & decrease pain with dressing  Long term goals  Time Frame for Long term goals : No LTG set d/t short ELOS

## 2017-08-28 NOTE — BH NOTE
The Group read Teo Claudio 11:28-30    The group members were asked to share one burden on their heart that would ask God to talk about. Castro Wadeami said he wanted God to take away his burden of fear. He said he worried about not knowing what tomorrow will be. He said the Bible test helped him know that he need to trust God and turn over his worry. At the end of the group, prayer was offered for Castro Patterson for God's peace to be upon him as he worked through his worry.

## 2017-08-28 NOTE — PROGRESS NOTES
living condition. Reported sad feelings, constant thought to hurt by slicing wrist, anhedonia, low energy, no motivation, too much sleep and hopelessness. Reported that he takes all his medications as ordered but does not think that they are working well for him. Hx of mood swings but he denies any hallucinations or delusions. He denies any PTSD or OCD. He reported that he is currently suicidal and will harm self when he leaves here. Patient however is pleasant, smiling and making jokes, therefore mood is incongruent.       On arrival to  :  Patient seen today and continues to state that he is depressed and still suicidal with plan to cut self with knife. Patient stating that he is sad because of his life's situation--is financially not okay and does not have any place to live. He reports that he is not sleeping as well, is taking his medications and does not feel that they are helping him. Patient is smiling as always--incongruent mood. He terra any hallucinations.        Progress: Joseph Me reports he continues to feel  Depressed but feeling better than before. He says he is getting close to being ready for discharge and making phone calls into where he can be discharged to. Mood appears good, he is sleeping and eating well, denies feelings of self harm today, taking his meds. Denies side effects. Verified slept 8.0 hours contniuous. States he is attending groups, .      MSE:  Level of consciousness: Alert  Appearance: hospital attire, in chair and fair grooming   Behavior/Motor: no abnormalities noted   Attitude toward examiner: cooperative   Speech: Normal volume, goal directed, NRR  Mood:  I feel better   Affect: Reactive  Thought processes: Linear and goal directed   Suicidal Ideation: denied  feelings of self harm. Homicidal ideation: Denies homicidal ideations  Delusions: No evidence of delusions is observed  Perceptual Disturbance: Denies AH/VH;  No evidence of psychosis is observed.   Cognition: Oriented to person, place, time and situation   Concentration fair   Memory intact   Insight: better   Judgment:fair      Assessment:  Bipolar II D/O Depressed     Plan:  Continue current meds as ordered  Continue to encourage group attendance.   For possible discharge in 1-2 days     Electronically signed by Margarette Espinosa on 8/28/2017 at 1:53 PM

## 2017-08-28 NOTE — PROGRESS NOTES
This RN has reviewed and agrees with Jodie Green LPN's data collection and has collaborated with this LPN regarding the patient's care plan.

## 2017-08-29 LAB
GLUCOSE BLD-MCNC: 184 MG/DL (ref 70–108)
GLUCOSE BLD-MCNC: 199 MG/DL (ref 70–108)
GLUCOSE BLD-MCNC: 251 MG/DL (ref 70–108)

## 2017-08-29 PROCEDURE — 99231 SBSQ HOSP IP/OBS SF/LOW 25: CPT | Performed by: NURSE PRACTITIONER

## 2017-08-29 PROCEDURE — 6370000000 HC RX 637 (ALT 250 FOR IP): Performed by: ORTHOPAEDIC SURGERY

## 2017-08-29 PROCEDURE — 82948 REAGENT STRIP/BLOOD GLUCOSE: CPT

## 2017-08-29 PROCEDURE — 97110 THERAPEUTIC EXERCISES: CPT

## 2017-08-29 PROCEDURE — 1240000000 HC EMOTIONAL WELLNESS R&B

## 2017-08-29 RX ADMIN — SERTRALINE 200 MG: 100 TABLET, FILM COATED ORAL at 08:12

## 2017-08-29 RX ADMIN — CEPHALEXIN 500 MG: 500 CAPSULE ORAL at 06:32

## 2017-08-29 RX ADMIN — MIRTAZAPINE 15 MG: 15 TABLET, FILM COATED ORAL at 21:47

## 2017-08-29 RX ADMIN — CEPHALEXIN 500 MG: 500 CAPSULE ORAL at 15:03

## 2017-08-29 RX ADMIN — Medication 15 UNITS: at 18:44

## 2017-08-29 RX ADMIN — CEPHALEXIN 500 MG: 500 CAPSULE ORAL at 21:47

## 2017-08-29 RX ADMIN — Medication 15 UNITS: at 08:12

## 2017-08-29 RX ADMIN — INSULIN GLARGINE 15 UNITS: 100 INJECTION, SOLUTION SUBCUTANEOUS at 21:48

## 2017-08-29 ASSESSMENT — PAIN SCALES - GENERAL: PAINLEVEL_OUTOF10: 8

## 2017-08-29 ASSESSMENT — PAIN DESCRIPTION - ORIENTATION: ORIENTATION: LEFT

## 2017-08-29 ASSESSMENT — PAIN DESCRIPTION - LOCATION: LOCATION: SHOULDER

## 2017-08-29 NOTE — PROGRESS NOTES
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 when           Subjective  Subjective: RN ok'd session. Pt. supine in bed extra time to wake up agreeable to Ot treatment       Pain:  Pain Assessment  Patient Currently in Pain: Yes  Pain Level: 8  Pain Location: Shoulder  Pain Orientation: Left       Objective  Overall Cognitive Status: WFL          Bed mobility  Supine to Sit: Independent  Sit to Supine: Independent            Additional Activities: Pt. reports he was able to don his shirt without assist    Comment: Issued and reviewed Parselyel naty HEP, patient completed x8 reps x1 set of BUE shoulder flexion/extension, chest press, horizontal ab/aduction, elbow flexion, minimal ER/IE in order to improve ROM for UB ADLs. Activity Tolerance:  Activity Tolerance: Patient limited by pain    Assessment:     Performance deficits / Impairments: Decreased ADL status, Decreased ROM, Decreased strength  Prognosis: Fair  Discharge Recommendations: Continue to assess pending progress    Patient Education:  Patient Education: dowel naty HEP  Barriers to Learning: none    Equipment Recommendations:  Equipment Needed: No    Safety:  Safety Devices in place: Yes  Type of devices:  All fall risk precautions in place, Left in bed    Plan:  Times per week: 5x  Current Treatment Recommendations: ROM, Self-Care / ADL, Safety Education & Training    Goals:  Patient goals : decrease pain & stiffness LUE    Short term goals  Time Frame for Short term goals: 1 week  Short term goal 1: Complete supine dowel HEP with min vcs for technique to increase AROM L shoulder flexion >50 degrees  Short term goal 2: Complete UB dressing with adaptative strategies prn to increase ease & decrease pain with dressing  Long term goals  Time Frame for Long term goals : No LTG set d/t short ELOS

## 2017-08-29 NOTE — PLAN OF CARE
Problem: Altered Mood, Depressive Behavior  Goal: LTG-Able to verbalize and/or display a decrease in depressive symptoms  Outcome: Ongoing  Patient is blunt and flat but brightens with interaction, he has good eye contact and states he is ready to go home. When asked if he is depressed he says he isn't. He states he is hopeful for his future and rates his mood 8 on scale of 1-10 with 10 happiest.   Goal: STG-Able to verbalize suicidal ideations  Outcome: Ongoing  Patient denies suicidal ideation, no plan or intent to harm self or others. Patient remains on 15 minute visual checks and was encouraged to seek this nurse if he has any thoughts of self harm. Goal: LTG-Absence of self-harm  Outcome: Ongoing  Patient maintained in a safe and secure environment with no injuries to self or others. Patient denies suicidal ideations at this time. Goal: Participation in care planning  Outcome: Ongoing  Care plan reviewed with patient. Patient verbalized understanding of the plan of care and contributed to goal setting. Problem: Discharge Planning:  Goal: Discharged to appropriate level of care  Discharged to appropriate level of care   Outcome: Ongoing  Discharge planner working with patient to achieve optimal discharge plan, specific to the needs of this patient. Problem: Serum Glucose Level - Abnormal:  Goal: Ability to maintain appropriate glucose levels will improve  Ability to maintain appropriate glucose levels will improve   Outcome: Ongoing  Patient's glucose levels are maintained with insulin and diet. Problem: Falls - Risk of:  Goal: Will remain free from falls  Will remain free from falls   Outcome: Ongoing  Steady gait with prosthetic leg and no falls this shift. Problem: Anxiety:  Goal: Level of anxiety will decrease  Level of anxiety will decrease   Outcome: Ongoing  No complaints of anxiety this shift.     Problem: Skin Integrity:  Goal: Will show no infection signs and

## 2017-08-29 NOTE — PROGRESS NOTES
This RN has reviewed and agrees with Bogdan Zhang LPN's data collection and has collaborated with this LPN regarding the patient's care plan.

## 2017-08-29 NOTE — PLAN OF CARE
Problem: Social interaction  Goal: Increased social interaction  Outcome: Ongoing  Patient has attended at least 2 groups this shift so far so he is working toward his socialization goal for today. Patient has been out of his room for social interaction with others today.

## 2017-08-29 NOTE — PROGRESS NOTES
Psychiatry Progress Note                                                                                      8-                                                                                                                                     HPI:  Per Consult Note      Per ED note the patient is a 50 y.o. male  who presents with ongoing left shoulder pain and drainage from prior surgical wound of left shoulder.  Patient was initially seen by us in June 2017 for infected left shoulder.  He was then brought back to the OR on July 20 2017 for closure of this wound. Eda Carlson had been doing well however recently noticed pus from the incision.  Denies any fever or chills.  No numbness or tingling.  He has been in the psych unit here at [facility] secondary to suicidal tendencies. Daljit Guevara was at [facility] where they evaluated his left shoulder.  Determined the patient would need transferred to [facility] for definitive care by Dr Butch Carranza:   Patient is a 50 y.o.single  male with significant past psych history of Bipolar Disorder who was seen today for suicidal thoughts with plan to slice his wrist. Patient was seen in [facility] E in July of this year. Reported that he was at [facility] seeking help with abscessed  wound on his left shoulder. Had surgery on that shoulder in June at this hospital and have had to have additional 3 surgeries to drain the wound. Patient says he is here to have another surgery for same reason.       While at [facility] he started to have thoughts to hurt self and was sent to [facility] behavioral for follow up before being finally transferred to [facility] to follow up with his surgeons. Patient reported that he has been having worsening depressive symptoms since his was discharged from [facility].  Stated that his stressors stems from a mixture of everything ranging from his health issues to financial issues

## 2017-08-29 NOTE — PROGRESS NOTES
Group Therapy Note    Date: 8/28/2017  Start Time: 2000  End Time:  2020    Type of Group: Wrap-Up/relaxation    Patient's Goal:  \"Talk to Pat to go home. \"    Notes:  Met goal    Status After Intervention:  Unchanged    Participation Level: Minimal    Participation Quality: Appropriate      Speech:  normal      Thought Process/Content: Linear      Affective Functioning: Congruent and Blunted      Mood: euthymic      Level of consciousness:  Alert, Oriented x4 and Attentive      Response to Learning: Able to verbalize current knowledge/experience      Endings: None Reported    Modes of Intervention: Education and Support      Discipline Responsible: Registered Nurse      Signature:  Yoel Kelley RN

## 2017-08-30 LAB
GLUCOSE BLD-MCNC: 177 MG/DL (ref 70–108)
GLUCOSE BLD-MCNC: 205 MG/DL (ref 70–108)
GLUCOSE BLD-MCNC: 214 MG/DL (ref 70–108)
GLUCOSE BLD-MCNC: 250 MG/DL (ref 70–108)

## 2017-08-30 PROCEDURE — 82948 REAGENT STRIP/BLOOD GLUCOSE: CPT

## 2017-08-30 PROCEDURE — 6370000000 HC RX 637 (ALT 250 FOR IP): Performed by: ORTHOPAEDIC SURGERY

## 2017-08-30 PROCEDURE — 97110 THERAPEUTIC EXERCISES: CPT

## 2017-08-30 PROCEDURE — 1240000000 HC EMOTIONAL WELLNESS R&B

## 2017-08-30 PROCEDURE — 99231 SBSQ HOSP IP/OBS SF/LOW 25: CPT | Performed by: NURSE PRACTITIONER

## 2017-08-30 RX ADMIN — SERTRALINE 200 MG: 100 TABLET, FILM COATED ORAL at 08:33

## 2017-08-30 RX ADMIN — Medication 15 UNITS: at 17:17

## 2017-08-30 RX ADMIN — CEPHALEXIN 500 MG: 500 CAPSULE ORAL at 13:07

## 2017-08-30 RX ADMIN — OXYCODONE HYDROCHLORIDE AND ACETAMINOPHEN 2 TABLET: 5; 325 TABLET ORAL at 09:57

## 2017-08-30 RX ADMIN — CEPHALEXIN 500 MG: 500 CAPSULE ORAL at 06:04

## 2017-08-30 RX ADMIN — Medication 15 UNITS: at 08:32

## 2017-08-30 RX ADMIN — INSULIN GLARGINE 15 UNITS: 100 INJECTION, SOLUTION SUBCUTANEOUS at 21:41

## 2017-08-30 RX ADMIN — Medication 15 UNITS: at 13:07

## 2017-08-30 RX ADMIN — MIRTAZAPINE 15 MG: 15 TABLET, FILM COATED ORAL at 21:39

## 2017-08-30 RX ADMIN — CEPHALEXIN 500 MG: 500 CAPSULE ORAL at 21:39

## 2017-08-30 ASSESSMENT — PAIN SCALES - GENERAL
PAINLEVEL_OUTOF10: 9
PAINLEVEL_OUTOF10: 0

## 2017-08-30 NOTE — PLAN OF CARE
Problem: Altered Mood, Depressive Behavior  Goal: LTG-Able to verbalize acceptance of life and situations over which he or she has no control  Outcome: Ongoing  Working toward acceptance   Goal: LTG-Able to verbalize and/or display a decrease in depressive symptoms  Outcome: Completed Date Met:  08/30/17  Pt. Blunt and flat. Good peer interaction noted. Goal: STG-Able to verbalize suicidal ideations  Outcome: Met This Shift  Denies thoughts. Goal: LTG-Absence of self-harm  Outcome: Met This Shift  Denies thoughts of self harm. Goal: Patient Specific Goal  Outcome: Ongoing  Pt. Able to set a goal.   Goal: Participation in care planning  Outcome: Ongoing  Takes part in care planning. Problem: Discharge Planning:  Goal: Discharged to appropriate level of care  Mamta Locke is scheduled for follow up care on 09/13/17 with Clearance Angus at [address] and [facility] will assist pt with housing needs as well. Outcome: Not Met This Shift  Ongoing. Problem: Serum Glucose Level - Abnormal:  Goal: Ability to maintain appropriate glucose levels will improve  Ability to maintain appropriate glucose levels will improve   Outcome: Ongoing  See labs     Problem: Falls - Risk of:  Goal: Will remain free from falls  Will remain free from falls   Outcome: Met This Shift  Up ad regan, gait steady. Problem: KNOWLEDGE DEFICIT  Goal: Knowledge - personal safety  Outcome: Ongoing    Problem: Anxiety:  Goal: Level of anxiety will decrease  Level of anxiety will decrease   Outcome: Met This Shift  Denies anxiety. Problem: Skin Integrity:  Goal: Will show no infection signs and symptoms  Will show no infection signs and symptoms   Outcome: Not Met This Shift  Moderate amount of serosanguinous drainage noted from left shoulder incision. See skin under head to toe for details. Problem: Nutrition  Goal: Optimal nutrition therapy  Outcome: Ongoing  Good appetite noted.     Problem: Pain:  Goal: Pain level will

## 2017-08-30 NOTE — PROGRESS NOTES
Group Therapy Note    Date: 8/30/2017  Start Time: 10:00  End Time:  10:45  Number of Participants: 10    Type of Group: Psychotherapy    Wellness Binder Information  Module Name:    Session Number:      Patient's Goal:  To engage in the group process and identify healthy relationships. Notes:  Patient began group therapy but was requested by the medical provider so he did not have the opportunity to share with peers. Status After Intervention:  Improved    Participation Level:  Active Listener and Interactive    Participation Quality: Appropriate, Attentive, Sharing and Supportive      Speech:  normal      Thought Process/Content: Logical      Affective Functioning: Congruent      Mood: anxious      Level of consciousness:  Alert and Oriented x4      Response to Learning: Able to verbalize current knowledge/experience      Endings: None Reported    Modes of Intervention: Support, Socialization, Exploration, Clarifying and Problem-solving      Discipline Responsible: /Counselor      Signature:  NEFTALI Contreras

## 2017-08-30 NOTE — PROGRESS NOTES
and to his living condition. Reported sad feelings, constant thought to hurt by slicing wrist, anhedonia, low energy, no motivation, too much sleep and hopelessness. Reported that he takes all his medications as ordered but does not think that they are working well for him. Hx of mood swings but he denies any hallucinations or delusions. He denies any PTSD or OCD. He reported that he is currently suicidal and will harm self when he leaves here. Patient however is pleasant, smiling and making jokes, therefore mood is incongruent.       On arrival to  :  Patient seen today and continues to state that he is depressed and still suicidal with plan to cut self with knife. Patient stating that he is sad because of his life's situation--is financially not okay and does not have any place to live. He reports that he is not sleeping as well, is taking his medications and does not feel that they are helping him. Patient is smiling as always--incongruent mood. He terra any hallucinations.        Progress: Dru Uribe reports he feels okay today. Rates his mood to be 7/10. Denies any thoughts to hurt self or depression. He says he is waiting to receive a call from his friend regarding moving in with him. Will wait for him to confirm that today before discharge. He is eating and sleeping well, and verified 7.5 hours of sleep last night. He is taking his medications and denies any side effects. States he is attending groups, .      MSE:  Level of consciousness: Alert  Appearance: hospital attire, in chair and fair grooming   Behavior/Motor: no abnormalities noted   Attitude toward examiner: cooperative   Speech: Normal volume, goal directed, NRR  Mood:  \"I am okay\"   Affect: Reactive  Thought processes: Linear and goal directed   Suicidal Ideation: denied  feelings of self harm.   Homicidal ideation: Denies homicidal ideations  Delusions: No evidence of delusions is observed  Perceptual Disturbance: Denies AH/VH;  No evidence of

## 2017-08-31 ENCOUNTER — APPOINTMENT (OUTPATIENT)
Dept: MRI IMAGING | Age: 47
DRG: 908 | End: 2017-08-31
Attending: PSYCHIATRY & NEUROLOGY
Payer: MEDICARE

## 2017-08-31 LAB
GLUCOSE BLD-MCNC: 168 MG/DL (ref 70–108)
GLUCOSE BLD-MCNC: 200 MG/DL (ref 70–108)
GLUCOSE BLD-MCNC: 209 MG/DL (ref 70–108)
GLUCOSE BLD-MCNC: 239 MG/DL (ref 70–108)

## 2017-08-31 PROCEDURE — 6370000000 HC RX 637 (ALT 250 FOR IP): Performed by: ORTHOPAEDIC SURGERY

## 2017-08-31 PROCEDURE — 99232 SBSQ HOSP IP/OBS MODERATE 35: CPT | Performed by: NURSE PRACTITIONER

## 2017-08-31 PROCEDURE — 6370000000 HC RX 637 (ALT 250 FOR IP): Performed by: PSYCHIATRY & NEUROLOGY

## 2017-08-31 PROCEDURE — 0H9CXZZ DRAINAGE OF LEFT UPPER ARM SKIN, EXTERNAL APPROACH: ICD-10-PCS | Performed by: PHYSICIAN ASSISTANT

## 2017-08-31 PROCEDURE — 73221 MRI JOINT UPR EXTREM W/O DYE: CPT

## 2017-08-31 PROCEDURE — 82948 REAGENT STRIP/BLOOD GLUCOSE: CPT

## 2017-08-31 PROCEDURE — 1240000000 HC EMOTIONAL WELLNESS R&B

## 2017-08-31 RX ORDER — LORAZEPAM 2 MG/1
2 TABLET ORAL ONCE
Status: COMPLETED | OUTPATIENT
Start: 2017-08-31 | End: 2017-08-31

## 2017-08-31 RX ADMIN — LORAZEPAM 2 MG: 2 TABLET ORAL at 14:01

## 2017-08-31 RX ADMIN — Medication 15 UNITS: at 08:16

## 2017-08-31 RX ADMIN — CEPHALEXIN 500 MG: 500 CAPSULE ORAL at 21:50

## 2017-08-31 RX ADMIN — Medication 15 UNITS: at 17:17

## 2017-08-31 RX ADMIN — CEPHALEXIN 500 MG: 500 CAPSULE ORAL at 06:38

## 2017-08-31 RX ADMIN — Medication 15 UNITS: at 12:06

## 2017-08-31 RX ADMIN — CEPHALEXIN 500 MG: 500 CAPSULE ORAL at 14:01

## 2017-08-31 RX ADMIN — MIRTAZAPINE 15 MG: 15 TABLET, FILM COATED ORAL at 21:50

## 2017-08-31 RX ADMIN — INSULIN GLARGINE 15 UNITS: 100 INJECTION, SOLUTION SUBCUTANEOUS at 21:50

## 2017-08-31 RX ADMIN — SERTRALINE 200 MG: 100 TABLET, FILM COATED ORAL at 08:16

## 2017-08-31 ASSESSMENT — PAIN SCALES - GENERAL: PAINLEVEL_OUTOF10: 0

## 2017-08-31 NOTE — PROGRESS NOTES
This RN has reviewed and agrees with Analy Mix LPN's data collection and has collaborated with this LPN regarding the patient's care plan.

## 2017-08-31 NOTE — BH NOTE
PLAN OF CARE:     Start Time: 0845  End Time:   0930    Group Topic:  Daily Goals    Group Type:   Goal Group    Intervention/Goal:  To increase support and identify daily goals    Attendance:  attended    Affect: flat    Behavior: cooperative but guarded    Response:  appropriate    Daily Goal:    Find somewhere to stay. Talk to friends.     Progress:  Progressing to goal

## 2017-08-31 NOTE — PLAN OF CARE
Problem: Altered Mood, Depressive Behavior  Goal: LTG-Able to verbalize acceptance of life and situations over which he or she has no control  Outcome: Ongoing  Pt is working towards acceptance of situations over which he has no control. Encouraged patient to attend group therapy. Goal: STG-Able to verbalize suicidal ideations  Outcome: Ongoing  Pt denies suicidal ideations, no plan or intent to harm self. Pt remains on suicidal precautions 15 checks for safety. Instructed to seek staff as needed for thoughts of self harm. Goal: LTG-Absence of self-harm  Outcome: Ongoing  No self harm behaviors were observed or reported so far this shift. Remains on every 15 minutes precautions for safety. Goal: Patient Specific Goal  Outcome: Ongoing  Pt reports goal for today is to \"talk to friends and find somewhere to live\". This was not met because pt has not found anywhere to live. Goal: Participation in care planning  Outcome: Ongoing  Pt discussed treatment plan with physician/medical staff, attending group, and complaint with medications. Problem: Discharge Planning:  Goal: Discharged to appropriate level of care  Jessika Hamilton is scheduled for follow up care on 09/13/17 with Al Armstrong at [address] [place] [address] and Wellness will assist pt with housing needs as well. Outcome: Ongoing  Pt voices he needs to find somewhere to live before discharge. Discharge planner working with patient to achieve optimal discharge plans, specific to individual needs. Problem: Serum Glucose Level - Abnormal:  Goal: Ability to maintain appropriate glucose levels will improve  Ability to maintain appropriate glucose levels will improve   Outcome: Ongoing  Pt's blood sugars ranging in low 200's this shift.     Problem: Falls - Risk of:  Goal: Will remain free from falls  Will remain free from falls   Outcome: Ongoing  No falls were observed or reported so far this shift, gait steady when ambulating and wears own

## 2017-08-31 NOTE — PROGRESS NOTES
Ortho    Patient was seen yesterday PM, well known to our practice    S/p left shoulder I and D    Continues to drain fluid from the left shoulder    Stitches in      D/W Dr Galan Linn who recommends MRI left shoulder to evaluate for osteomyelitis

## 2017-08-31 NOTE — PROGRESS NOTES
Group Therapy Note    Date: 8/31/2017  Start Time: 10:00  End Time:  10:45  Number of Participants: 10    Type of Group: Psychotherapy    Wellness Binder Information  Module Name:    Session Number:      Patient's Goal:  To engage in the group process and identify healthy relationships. Notes:  Patient was engaged in the group process and identified the need to care for himself and improve interaction with others. Status After Intervention:  Improved    Participation Level:  Active Listener and Interactive    Participation Quality: Appropriate, Attentive, Sharing and Supportive      Speech:  normal      Thought Process/Content: Logical      Affective Functioning: Congruent      Mood: anxious      Level of consciousness:  Alert and Oriented x4      Response to Learning: Able to verbalize current knowledge/experience      Endings: None Reported    Modes of Intervention: Support, Socialization, Exploration, Clarifying and Problem-solving      Discipline Responsible: /Counselor      Signature:  NEFTALI Arceo

## 2017-08-31 NOTE — FLOWSHEET NOTE
08/31/17 1335   Provider Notification   Reason for Communication Review case  (need order for ativan, for MRI)   Provider Name Dr. Luciana Haney   Provider Notification Physician   Method of Communication Call   Response See orders   Notification Time [phone]   Spoke with Dr. Luciana Haney about pt needing something for sedation for  MRI, orders received to give  Ativan 2 mg po x 1.

## 2017-09-01 VITALS
DIASTOLIC BLOOD PRESSURE: 59 MMHG | BODY MASS INDEX: 36.8 KG/M2 | HEIGHT: 66 IN | HEART RATE: 98 BPM | TEMPERATURE: 97.9 F | RESPIRATION RATE: 18 BRPM | SYSTOLIC BLOOD PRESSURE: 121 MMHG | WEIGHT: 229 LBS | OXYGEN SATURATION: 98 %

## 2017-09-01 LAB
GLUCOSE BLD-MCNC: 137 MG/DL (ref 70–108)
GLUCOSE BLD-MCNC: 181 MG/DL (ref 70–108)
GLUCOSE BLD-MCNC: 194 MG/DL (ref 70–108)
GLUCOSE BLD-MCNC: 196 MG/DL (ref 70–108)

## 2017-09-01 PROCEDURE — 1240000000 HC EMOTIONAL WELLNESS R&B

## 2017-09-01 PROCEDURE — 99231 SBSQ HOSP IP/OBS SF/LOW 25: CPT | Performed by: PSYCHIATRY & NEUROLOGY

## 2017-09-01 PROCEDURE — 6370000000 HC RX 637 (ALT 250 FOR IP): Performed by: ORTHOPAEDIC SURGERY

## 2017-09-01 PROCEDURE — 5130000000 HC BRIDGE APPOINTMENT

## 2017-09-01 PROCEDURE — 82948 REAGENT STRIP/BLOOD GLUCOSE: CPT

## 2017-09-01 RX ORDER — CEPHALEXIN 500 MG/1
500 CAPSULE ORAL 3 TIMES DAILY
Qty: 15 CAPSULE | Refills: 0 | Status: SHIPPED | OUTPATIENT
Start: 2017-09-01 | End: 2017-09-06

## 2017-09-01 RX ORDER — ARIPIPRAZOLE 10 MG/1
10 TABLET ORAL DAILY
Qty: 30 TABLET | Refills: 0 | Status: ON HOLD | OUTPATIENT
Start: 2017-09-01 | End: 2018-03-29

## 2017-09-01 RX ORDER — MIRTAZAPINE 15 MG/1
15 TABLET, FILM COATED ORAL NIGHTLY
Qty: 30 TABLET | Refills: 0 | Status: ON HOLD | OUTPATIENT
Start: 2017-09-01 | End: 2018-03-29

## 2017-09-01 RX ADMIN — CEPHALEXIN 500 MG: 500 CAPSULE ORAL at 21:57

## 2017-09-01 RX ADMIN — CEPHALEXIN 500 MG: 500 CAPSULE ORAL at 06:35

## 2017-09-01 RX ADMIN — Medication 15 UNITS: at 17:08

## 2017-09-01 RX ADMIN — MIRTAZAPINE 15 MG: 15 TABLET, FILM COATED ORAL at 21:57

## 2017-09-01 RX ADMIN — INSULIN GLARGINE 15 UNITS: 100 INJECTION, SOLUTION SUBCUTANEOUS at 21:57

## 2017-09-01 RX ADMIN — Medication 15 UNITS: at 08:04

## 2017-09-01 RX ADMIN — Medication 15 UNITS: at 12:26

## 2017-09-01 RX ADMIN — SERTRALINE 200 MG: 100 TABLET, FILM COATED ORAL at 08:06

## 2017-09-01 RX ADMIN — CEPHALEXIN 500 MG: 500 CAPSULE ORAL at 13:34

## 2017-09-01 ASSESSMENT — PAIN SCALES - GENERAL: PAINLEVEL_OUTOF10: 0

## 2017-09-02 LAB
GLUCOSE BLD-MCNC: 183 MG/DL (ref 70–108)
GLUCOSE BLD-MCNC: 187 MG/DL (ref 70–108)
GLUCOSE BLD-MCNC: 231 MG/DL (ref 70–108)

## 2017-09-02 PROCEDURE — A6446 CONFORM BAND S W>=3" <5"/YD: HCPCS

## 2017-09-02 PROCEDURE — 6370000000 HC RX 637 (ALT 250 FOR IP): Performed by: PSYCHIATRY & NEUROLOGY

## 2017-09-02 PROCEDURE — 82948 REAGENT STRIP/BLOOD GLUCOSE: CPT

## 2017-09-02 PROCEDURE — 99238 HOSP IP/OBS DSCHRG MGMT 30/<: CPT | Performed by: NURSE PRACTITIONER

## 2017-09-02 PROCEDURE — 6370000000 HC RX 637 (ALT 250 FOR IP): Performed by: ORTHOPAEDIC SURGERY

## 2017-09-02 PROCEDURE — 5130000000 HC BRIDGE APPOINTMENT

## 2017-09-02 RX ADMIN — CEPHALEXIN 500 MG: 500 CAPSULE ORAL at 15:43

## 2017-09-02 RX ADMIN — Medication 15 UNITS: at 11:01

## 2017-09-02 RX ADMIN — Medication 15 UNITS: at 15:43

## 2017-09-02 RX ADMIN — CEPHALEXIN 500 MG: 500 CAPSULE ORAL at 06:15

## 2017-09-02 RX ADMIN — SERTRALINE 200 MG: 100 TABLET, FILM COATED ORAL at 11:01

## 2017-09-02 NOTE — PROGRESS NOTES
Clinician made a second phone call to the [facility]. A message was left asking for a phone call back.

## 2017-09-02 NOTE — PROGRESS NOTES
Behavioral Health   Discharge Note    Pt discharged with followings belongings:   Dentures: None  Vision - Corrective Lenses: None  Hearing Aid: None  Jewelry: None  Body Piercings Removed: N/A  Clothing: Other (Comment)  Were All Patient Medications Collected?: Not Applicable  Other Valuables: None   Valuables sent home with N/A. Valuables retrieved from safe, Security envelope number:  N/A and returned to patient. Patient left department with Departure Mode: By self via Mobility at Departure: Ambulatory, discharged to Discharged to: Private Residence. \"An Important Message from [employer] About Your Rights\" form reviewed, signed yes. Patient education on aftercare instructions: yes  Bridge Appointment completed: Reviewed Discharge Instructions with patient. Patient verbalizes understanding and agreement with the discharge plan using the teachback method. Information faxed to [address] [place] [address] and [facility] by  Patient verbalize understanding of AVS:  yes. Status EXAM upon discharge:  Status and Exam  Normal: Yes  Facial Expression: Flat  Affect: Appropriate  Level of Consciousness: Alert  Mood:Normal: No  Mood: Depressed  Motor Activity:Normal: Yes  Motor Activity: Decreased  Interview Behavior: Cooperative  Preception: Rhodes to Person, Missy Latham to Time, Rhodes to Place, Rhodes to Situation  Attention:Normal: Yes  Attention: Distractible  Thought Processes: Circumstantial  Thought Content:Normal: Yes  Thought Content: Poverty of Content  Hallucinations: None  Delusions: No  Memory:Normal: No  Memory: Poor Recent  Insight and Judgment: No  Insight and Judgment: Poor Judgment, Poor Insight  Present Suicidal Ideation: No  Present Homicidal Ideation: No    Nadia Garcia LPN

## 2017-09-02 NOTE — BH NOTE
Group Therapy Note    Date: 9/2/2017  Start Time: 1500  End Time:  4885  Number of Participants: 12    Type of Group: Psychoeducation      Group Goal:  Increase Self Esteem and increase insight into positive affirmations. Notes:  Patient says \"pass\" when asked to given one positive self trait. Status After Intervention:  Improved    Participation Level: Active Listener    Participation Quality: Appropriate and Attentive and does participate in the discussion. Speech:  Normal quiet      Thought Process/Content: Logical      Affective Functioning: Blunted, Flat and Constricted/Restricted      Mood: depressed      Level of consciousness:  Alert and Attentive      Response to Learning: Able to verbalize current knowledge/experience, capable of improved insight and retain some content. Endings: None Reported    Modes of Intervention: Education, Support and Socialization      Discipline Responsible: Registered Nurse    Gave positive praise for coming to group today.      Signature:  EMMANUEL BeltranN

## 2017-09-02 NOTE — PROGRESS NOTES
Clinician had spoken with the patient about the discharge status. Patient did give an address where he could be taken to, [address] [place] [address], [facility] [place] [address]. Clinician notes that at this time, he called patient's insurance provider at [phone] to attempt to arrange transportation for the patient out of the hospital.  Manolo Munson with call center answered the phone. They could not pull him up in the system. Clinician then called for a [id number] ride for the patient to be dropped-off at the above address.

## 2017-09-25 LAB
FUNGUS IDENTIFIED: NORMAL
FUNGUS SMEAR: NORMAL

## 2018-03-24 ENCOUNTER — APPOINTMENT (OUTPATIENT)
Dept: GENERAL RADIOLOGY | Age: 48
DRG: 872 | End: 2018-03-24
Payer: MEDICARE

## 2018-03-24 ENCOUNTER — HOSPITAL ENCOUNTER (INPATIENT)
Age: 48
LOS: 5 days | Discharge: HOME HEALTH | DRG: 872 | End: 2018-03-29
Attending: INTERNAL MEDICINE | Admitting: INTERNAL MEDICINE
Payer: MEDICARE

## 2018-03-24 DIAGNOSIS — R65.10 SIRS (SYSTEMIC INFLAMMATORY RESPONSE SYNDROME) (HCC): Primary | ICD-10-CM

## 2018-03-24 DIAGNOSIS — E86.0 DEHYDRATION: ICD-10-CM

## 2018-03-24 PROBLEM — A41.9 SEPSIS (HCC): Status: ACTIVE | Noted: 2018-03-24

## 2018-03-24 LAB
ALBUMIN SERPL-MCNC: 3.7 G/DL (ref 3.5–5.1)
ALP BLD-CCNC: 99 U/L (ref 38–126)
ALT SERPL-CCNC: 14 U/L (ref 11–66)
ANION GAP SERPL CALCULATED.3IONS-SCNC: 19 MEQ/L (ref 8–16)
AST SERPL-CCNC: 24 U/L (ref 5–40)
AVERAGE GLUCOSE: 261 MG/DL (ref 70–126)
BACTERIA: ABNORMAL /HPF
BASOPHILS # BLD: 0.1 %
BASOPHILS ABSOLUTE: 0 THOU/MM3 (ref 0–0.1)
BETA-HYDROXYBUTYRATE: 0.85 MG/DL (ref 0.2–2.81)
BILIRUB SERPL-MCNC: 0.9 MG/DL (ref 0.3–1.2)
BILIRUBIN URINE: NEGATIVE
BLOOD, URINE: ABNORMAL
BUN BLDV-MCNC: 12 MG/DL (ref 7–22)
CALCIUM SERPL-MCNC: 9.3 MG/DL (ref 8.5–10.5)
CASTS 2: ABNORMAL /LPF
CASTS UA: ABNORMAL /LPF
CHARACTER, URINE: CLEAR
CHLORIDE BLD-SCNC: 88 MEQ/L (ref 98–111)
CO2: 22 MEQ/L (ref 23–33)
COLOR: YELLOW
CREAT SERPL-MCNC: 1 MG/DL (ref 0.4–1.2)
CRYSTALS, UA: ABNORMAL
EOSINOPHIL # BLD: 0.1 %
EOSINOPHILS ABSOLUTE: 0 THOU/MM3 (ref 0–0.4)
EPITHELIAL CELLS, UA: ABNORMAL /HPF
FLU A ANTIGEN: NEGATIVE
FLU B ANTIGEN: NEGATIVE
GFR SERPL CREATININE-BSD FRML MDRD: 79 ML/MIN/1.73M2
GLUCOSE BLD-MCNC: 286 MG/DL (ref 70–108)
GLUCOSE BLD-MCNC: 306 MG/DL (ref 70–108)
GLUCOSE BLD-MCNC: 322 MG/DL (ref 70–108)
GLUCOSE BLD-MCNC: 352 MG/DL (ref 70–108)
GLUCOSE BLD-MCNC: 440 MG/DL
GLUCOSE BLD-MCNC: 440 MG/DL (ref 70–108)
GLUCOSE URINE: >= 1000 MG/DL
HBA1C MFR BLD: 10.7 % (ref 4.4–6.4)
HCT VFR BLD CALC: 43.4 % (ref 42–52)
HEMOGLOBIN: 14.8 GM/DL (ref 14–18)
KETONES, URINE: ABNORMAL
LACTIC ACID, SEPSIS: 4.1 MMOL/L (ref 0.5–1.9)
LACTIC ACID: 2.2 MMOL/L (ref 0.5–2.2)
LEUKOCYTE ESTERASE, URINE: NEGATIVE
LIPASE: 15.4 U/L (ref 5.6–51.3)
LYMPHOCYTES # BLD: 9.9 %
LYMPHOCYTES ABSOLUTE: 1.1 THOU/MM3 (ref 1–4.8)
MAGNESIUM: 2 MG/DL (ref 1.6–2.4)
MCH RBC QN AUTO: 29.3 PG (ref 27–31)
MCHC RBC AUTO-ENTMCNC: 34 GM/DL (ref 33–37)
MCV RBC AUTO: 86 FL (ref 80–94)
MISCELLANEOUS 2: ABNORMAL
MONOCYTES # BLD: 6.6 %
MONOCYTES ABSOLUTE: 0.8 THOU/MM3 (ref 0.4–1.3)
NITRITE, URINE: NEGATIVE
NUCLEATED RED BLOOD CELLS: 0 /100 WBC
OSMOLALITY CALCULATION: 272.8 MOSMOL/KG (ref 275–300)
PDW BLD-RTO: 14.1 % (ref 11.5–14.5)
PH UA: 5.5
PH VENOUS: 7.35 (ref 7.33–7.43)
PLATELET # BLD: 253 THOU/MM3 (ref 130–400)
PMV BLD AUTO: 8 FL (ref 7.4–10.4)
POTASSIUM SERPL-SCNC: 4.3 MEQ/L (ref 3.5–5.2)
PRO-BNP: 85.8 PG/ML (ref 0–900)
PROCALCITONIN: 0.35 NG/ML (ref 0.01–0.09)
PROTEIN UA: NEGATIVE
RBC # BLD: 5.05 MILL/MM3 (ref 4.7–6.1)
RBC URINE: ABNORMAL /HPF
RENAL EPITHELIAL, UA: ABNORMAL
SEG NEUTROPHILS: 83.3 %
SEGMENTED NEUTROPHILS ABSOLUTE COUNT: 9.7 THOU/MM3 (ref 1.8–7.7)
SODIUM BLD-SCNC: 129 MEQ/L (ref 135–145)
SPECIFIC GRAVITY, URINE: > 1.03 (ref 1–1.03)
TOTAL PROTEIN: 8.6 G/DL (ref 6.1–8)
TROPONIN T: < 0.01 NG/ML
UROBILINOGEN, URINE: 1 EU/DL
WBC # BLD: 11.6 THOU/MM3 (ref 4.8–10.8)
WBC UA: ABNORMAL /HPF
YEAST: ABNORMAL

## 2018-03-24 PROCEDURE — 2580000003 HC RX 258: Performed by: PHYSICIAN ASSISTANT

## 2018-03-24 PROCEDURE — 71046 X-RAY EXAM CHEST 2 VIEWS: CPT

## 2018-03-24 PROCEDURE — 93005 ELECTROCARDIOGRAM TRACING: CPT | Performed by: INTERNAL MEDICINE

## 2018-03-24 PROCEDURE — 99285 EMERGENCY DEPT VISIT HI MDM: CPT

## 2018-03-24 PROCEDURE — 6370000000 HC RX 637 (ALT 250 FOR IP): Performed by: PHYSICIAN ASSISTANT

## 2018-03-24 PROCEDURE — 84145 PROCALCITONIN (PCT): CPT

## 2018-03-24 PROCEDURE — 6370000000 HC RX 637 (ALT 250 FOR IP): Performed by: INTERNAL MEDICINE

## 2018-03-24 PROCEDURE — 94640 AIRWAY INHALATION TREATMENT: CPT

## 2018-03-24 PROCEDURE — 87804 INFLUENZA ASSAY W/OPTIC: CPT

## 2018-03-24 PROCEDURE — 96365 THER/PROPH/DIAG IV INF INIT: CPT

## 2018-03-24 PROCEDURE — 80053 COMPREHEN METABOLIC PANEL: CPT

## 2018-03-24 PROCEDURE — 83036 HEMOGLOBIN GLYCOSYLATED A1C: CPT

## 2018-03-24 PROCEDURE — 82800 BLOOD PH: CPT

## 2018-03-24 PROCEDURE — 83735 ASSAY OF MAGNESIUM: CPT

## 2018-03-24 PROCEDURE — 83880 ASSAY OF NATRIURETIC PEPTIDE: CPT

## 2018-03-24 PROCEDURE — 83605 ASSAY OF LACTIC ACID: CPT

## 2018-03-24 PROCEDURE — 1200000000 HC SEMI PRIVATE

## 2018-03-24 PROCEDURE — 96361 HYDRATE IV INFUSION ADD-ON: CPT

## 2018-03-24 PROCEDURE — 2580000003 HC RX 258: Performed by: INTERNAL MEDICINE

## 2018-03-24 PROCEDURE — 84484 ASSAY OF TROPONIN QUANT: CPT

## 2018-03-24 PROCEDURE — 80307 DRUG TEST PRSMV CHEM ANLYZR: CPT

## 2018-03-24 PROCEDURE — 6360000002 HC RX W HCPCS: Performed by: INTERNAL MEDICINE

## 2018-03-24 PROCEDURE — 99223 1ST HOSP IP/OBS HIGH 75: CPT | Performed by: INTERNAL MEDICINE

## 2018-03-24 PROCEDURE — 82948 REAGENT STRIP/BLOOD GLUCOSE: CPT

## 2018-03-24 PROCEDURE — 6360000002 HC RX W HCPCS: Performed by: PHYSICIAN ASSISTANT

## 2018-03-24 PROCEDURE — 82010 KETONE BODYS QUAN: CPT

## 2018-03-24 PROCEDURE — 85025 COMPLETE CBC W/AUTO DIFF WBC: CPT

## 2018-03-24 PROCEDURE — G0480 DRUG TEST DEF 1-7 CLASSES: HCPCS

## 2018-03-24 PROCEDURE — 81001 URINALYSIS AUTO W/SCOPE: CPT

## 2018-03-24 PROCEDURE — 87040 BLOOD CULTURE FOR BACTERIA: CPT

## 2018-03-24 PROCEDURE — 36415 COLL VENOUS BLD VENIPUNCTURE: CPT

## 2018-03-24 PROCEDURE — 96372 THER/PROPH/DIAG INJ SC/IM: CPT

## 2018-03-24 PROCEDURE — 83690 ASSAY OF LIPASE: CPT

## 2018-03-24 RX ORDER — GUAIFENESIN/DEXTROMETHORPHAN 100-10MG/5
5 SYRUP ORAL EVERY 4 HOURS PRN
Status: DISCONTINUED | OUTPATIENT
Start: 2018-03-24 | End: 2018-03-29 | Stop reason: HOSPADM

## 2018-03-24 RX ORDER — INSULIN GLARGINE 100 [IU]/ML
20 INJECTION, SOLUTION SUBCUTANEOUS DAILY
Status: ON HOLD | COMMUNITY
End: 2018-03-29

## 2018-03-24 RX ORDER — SODIUM CHLORIDE 0.9 % (FLUSH) 0.9 %
10 SYRINGE (ML) INJECTION PRN
Status: DISCONTINUED | OUTPATIENT
Start: 2018-03-24 | End: 2018-03-29 | Stop reason: HOSPADM

## 2018-03-24 RX ORDER — DEXTROSE MONOHYDRATE 50 MG/ML
100 INJECTION, SOLUTION INTRAVENOUS PRN
Status: DISCONTINUED | OUTPATIENT
Start: 2018-03-24 | End: 2018-03-29 | Stop reason: HOSPADM

## 2018-03-24 RX ORDER — ONDANSETRON 2 MG/ML
4 INJECTION INTRAMUSCULAR; INTRAVENOUS EVERY 6 HOURS PRN
Status: DISCONTINUED | OUTPATIENT
Start: 2018-03-24 | End: 2018-03-24 | Stop reason: RX

## 2018-03-24 RX ORDER — SODIUM CHLORIDE 9 MG/ML
INJECTION, SOLUTION INTRAVENOUS CONTINUOUS
Status: DISCONTINUED | OUTPATIENT
Start: 2018-03-24 | End: 2018-03-24 | Stop reason: SDUPTHER

## 2018-03-24 RX ORDER — INSULIN GLARGINE 100 [IU]/ML
20 INJECTION, SOLUTION SUBCUTANEOUS NIGHTLY
Status: DISCONTINUED | OUTPATIENT
Start: 2018-03-24 | End: 2018-03-25

## 2018-03-24 RX ORDER — 0.9 % SODIUM CHLORIDE 0.9 %
1300 INTRAVENOUS SOLUTION INTRAVENOUS ONCE
Status: COMPLETED | OUTPATIENT
Start: 2018-03-24 | End: 2018-03-24

## 2018-03-24 RX ORDER — NICOTINE POLACRILEX 4 MG
15 LOZENGE BUCCAL PRN
Status: DISCONTINUED | OUTPATIENT
Start: 2018-03-24 | End: 2018-03-29 | Stop reason: HOSPADM

## 2018-03-24 RX ORDER — 0.9 % SODIUM CHLORIDE 0.9 %
2000 INTRAVENOUS SOLUTION INTRAVENOUS ONCE
Status: COMPLETED | OUTPATIENT
Start: 2018-03-24 | End: 2018-03-24

## 2018-03-24 RX ORDER — MIRTAZAPINE 15 MG/1
15 TABLET, FILM COATED ORAL NIGHTLY
Status: DISCONTINUED | OUTPATIENT
Start: 2018-03-24 | End: 2018-03-29 | Stop reason: HOSPADM

## 2018-03-24 RX ORDER — DEXTROSE MONOHYDRATE 25 G/50ML
12.5 INJECTION, SOLUTION INTRAVENOUS PRN
Status: DISCONTINUED | OUTPATIENT
Start: 2018-03-24 | End: 2018-03-29 | Stop reason: HOSPADM

## 2018-03-24 RX ORDER — ACETAMINOPHEN 500 MG
1000 TABLET ORAL ONCE
Status: COMPLETED | OUTPATIENT
Start: 2018-03-24 | End: 2018-03-24

## 2018-03-24 RX ORDER — ARIPIPRAZOLE 10 MG/1
10 TABLET ORAL DAILY
Status: DISCONTINUED | OUTPATIENT
Start: 2018-03-24 | End: 2018-03-29 | Stop reason: HOSPADM

## 2018-03-24 RX ORDER — ONDANSETRON 4 MG/1
4 TABLET, ORALLY DISINTEGRATING ORAL EVERY 6 HOURS PRN
Status: DISCONTINUED | OUTPATIENT
Start: 2018-03-24 | End: 2018-03-29 | Stop reason: HOSPADM

## 2018-03-24 RX ORDER — ACETAMINOPHEN 325 MG/1
650 TABLET ORAL EVERY 4 HOURS PRN
Status: DISCONTINUED | OUTPATIENT
Start: 2018-03-24 | End: 2018-03-29 | Stop reason: HOSPADM

## 2018-03-24 RX ORDER — SODIUM CHLORIDE 0.9 % (FLUSH) 0.9 %
10 SYRINGE (ML) INJECTION EVERY 12 HOURS SCHEDULED
Status: DISCONTINUED | OUTPATIENT
Start: 2018-03-24 | End: 2018-03-29 | Stop reason: HOSPADM

## 2018-03-24 RX ORDER — IPRATROPIUM BROMIDE AND ALBUTEROL SULFATE 2.5; .5 MG/3ML; MG/3ML
1 SOLUTION RESPIRATORY (INHALATION) ONCE
Status: COMPLETED | OUTPATIENT
Start: 2018-03-24 | End: 2018-03-24

## 2018-03-24 RX ORDER — SODIUM CHLORIDE 9 MG/ML
INJECTION, SOLUTION INTRAVENOUS CONTINUOUS
Status: DISCONTINUED | OUTPATIENT
Start: 2018-03-24 | End: 2018-03-27

## 2018-03-24 RX ADMIN — MIRTAZAPINE 15 MG: 15 TABLET, FILM COATED ORAL at 23:37

## 2018-03-24 RX ADMIN — INSULIN HUMAN 4 UNITS: 100 INJECTION, SOLUTION PARENTERAL at 17:39

## 2018-03-24 RX ADMIN — ARIPIPRAZOLE 10 MG: 10 TABLET ORAL at 23:37

## 2018-03-24 RX ADMIN — ACETAMINOPHEN 1000 MG: 500 TABLET ORAL at 14:50

## 2018-03-24 RX ADMIN — SODIUM CHLORIDE 1300 ML: 9 INJECTION, SOLUTION INTRAVENOUS at 18:15

## 2018-03-24 RX ADMIN — CEFEPIME HYDROCHLORIDE 2 G: 2 INJECTION, POWDER, FOR SOLUTION INTRAVENOUS at 18:37

## 2018-03-24 RX ADMIN — IPRATROPIUM BROMIDE AND ALBUTEROL SULFATE 1 AMPULE: .5; 3 SOLUTION RESPIRATORY (INHALATION) at 15:53

## 2018-03-24 RX ADMIN — VANCOMYCIN HYDROCHLORIDE 1500 MG: 1 INJECTION, POWDER, LYOPHILIZED, FOR SOLUTION INTRAVENOUS at 17:16

## 2018-03-24 RX ADMIN — Medication 3 UNITS: at 23:37

## 2018-03-24 RX ADMIN — ENOXAPARIN SODIUM 40 MG: 40 INJECTION SUBCUTANEOUS at 23:48

## 2018-03-24 RX ADMIN — SODIUM CHLORIDE 1000 ML: 9 INJECTION, SOLUTION INTRAVENOUS at 17:13

## 2018-03-24 RX ADMIN — ACETAMINOPHEN 650 MG: 325 TABLET ORAL at 23:36

## 2018-03-24 RX ADMIN — SODIUM CHLORIDE 1000 ML: 9 INJECTION, SOLUTION INTRAVENOUS at 16:06

## 2018-03-24 RX ADMIN — SODIUM CHLORIDE: 9 INJECTION, SOLUTION INTRAVENOUS at 23:36

## 2018-03-24 RX ADMIN — SODIUM CHLORIDE 1000 ML: 9 INJECTION, SOLUTION INTRAVENOUS at 15:06

## 2018-03-24 RX ADMIN — INSULIN GLARGINE 20 UNITS: 100 INJECTION, SOLUTION SUBCUTANEOUS at 23:48

## 2018-03-24 RX ADMIN — SERTRALINE 150 MG: 100 TABLET, FILM COATED ORAL at 23:37

## 2018-03-24 ASSESSMENT — ENCOUNTER SYMPTOMS
RHINORRHEA: 0
EYE REDNESS: 0
COUGH: 1
NAUSEA: 0
EYE DISCHARGE: 0
DIARRHEA: 0
VOMITING: 0
SHORTNESS OF BREATH: 1
ABDOMINAL PAIN: 0
SORE THROAT: 0
BACK PAIN: 0
WHEEZING: 0

## 2018-03-24 ASSESSMENT — PAIN DESCRIPTION - PAIN TYPE: TYPE: ACUTE PAIN

## 2018-03-24 ASSESSMENT — PAIN SCALES - GENERAL
PAINLEVEL_OUTOF10: 0
PAINLEVEL_OUTOF10: 0
PAINLEVEL_OUTOF10: 8

## 2018-03-24 ASSESSMENT — PAIN DESCRIPTION - LOCATION: LOCATION: CHEST;ARM

## 2018-03-24 ASSESSMENT — PAIN DESCRIPTION - DESCRIPTORS: DESCRIPTORS: PRESSURE

## 2018-03-24 ASSESSMENT — PAIN DESCRIPTION - ORIENTATION: ORIENTATION: MID;RIGHT

## 2018-03-24 ASSESSMENT — PAIN DESCRIPTION - FREQUENCY: FREQUENCY: CONTINUOUS

## 2018-03-25 PROBLEM — R65.10 SIRS (SYSTEMIC INFLAMMATORY RESPONSE SYNDROME) (HCC): Status: ACTIVE | Noted: 2018-03-24

## 2018-03-25 LAB
ADENOVIRUS F 40 41 PCR: NOT DETECTED
ANION GAP SERPL CALCULATED.3IONS-SCNC: 17 MEQ/L (ref 8–16)
ASTROVIRUS PCR: NOT DETECTED
BUN BLDV-MCNC: 11 MG/DL (ref 7–22)
CALCIUM SERPL-MCNC: 8.1 MG/DL (ref 8.5–10.5)
CAMPYLOBACTER PCR: NOT DETECTED
CHLORIDE BLD-SCNC: 96 MEQ/L (ref 98–111)
CLOSTRIDIUM DIFFICILE, PCR: NOT DETECTED
CO2: 19 MEQ/L (ref 23–33)
CREAT SERPL-MCNC: 0.8 MG/DL (ref 0.4–1.2)
CRYPTOSPORIDIUM PCR: NOT DETECTED
CYCLOSPORA CAYETANENSIS PCR: NOT DETECTED
E COLI 0157 PCR: NORMAL
E COLI ENTEROAGGREGATIVE PCR: NOT DETECTED
E COLI ENTEROPATHOGENIC PCR: NOT DETECTED
E COLI ENTEROTOXIGENIC PCR: NOT DETECTED
E COLI SHIGA LIKE TOXIN PCR: NOT DETECTED
E COLI SHIGELLA/ENTEROINVASIVE PCR: NOT DETECTED
E HISTOLYTICA GI FILM ARRAY: NOT DETECTED
EKG ATRIAL RATE: 129 BPM
EKG P AXIS: 64 DEGREES
EKG P-R INTERVAL: 146 MS
EKG Q-T INTERVAL: 296 MS
EKG QRS DURATION: 74 MS
EKG QTC CALCULATION (BAZETT): 433 MS
EKG R AXIS: 59 DEGREES
EKG T AXIS: 62 DEGREES
EKG VENTRICULAR RATE: 129 BPM
GFR SERPL CREATININE-BSD FRML MDRD: > 90 ML/MIN/1.73M2
GIARDIA LAMBLIA PCR: NOT DETECTED
GLUCOSE BLD-MCNC: 220 MG/DL (ref 70–108)
GLUCOSE BLD-MCNC: 225 MG/DL (ref 70–108)
GLUCOSE BLD-MCNC: 304 MG/DL (ref 70–108)
GLUCOSE BLD-MCNC: 313 MG/DL (ref 70–108)
GLUCOSE BLD-MCNC: 329 MG/DL (ref 70–108)
HCT VFR BLD CALC: 36.1 % (ref 42–52)
HEMOGLOBIN: 12.4 GM/DL (ref 14–18)
LACTIC ACID, SEPSIS: 1.5 MMOL/L (ref 0.5–1.9)
LACTIC ACID, SEPSIS: 1.6 MMOL/L (ref 0.5–1.9)
MCH RBC QN AUTO: 29.7 PG (ref 27–31)
MCHC RBC AUTO-ENTMCNC: 34.4 GM/DL (ref 33–37)
MCV RBC AUTO: 86.5 FL (ref 80–94)
NOROVIRUS GI GII PCR: NOT DETECTED
PDW BLD-RTO: 13.9 % (ref 11.5–14.5)
PLATELET # BLD: 215 THOU/MM3 (ref 130–400)
PLESIOMONAS SHIGELLOIDES PCR: NOT DETECTED
PMV BLD AUTO: 7.8 FL (ref 7.4–10.4)
POTASSIUM REFLEX MAGNESIUM: 4.2 MEQ/L (ref 3.5–5.2)
RBC # BLD: 4.17 MILL/MM3 (ref 4.7–6.1)
ROTAVIRUS A PCR: NOT DETECTED
SALMONELLA PCR: NOT DETECTED
SAPOVIRUS PCR: NOT DETECTED
SODIUM BLD-SCNC: 132 MEQ/L (ref 135–145)
VIBRIO CHOLERAE PCR: NOT DETECTED
VIBRIO PCR: NOT DETECTED
WBC # BLD: 14.6 THOU/MM3 (ref 4.8–10.8)
YERSINIA ENTEROCOLITICA PCR: NOT DETECTED

## 2018-03-25 PROCEDURE — 6370000000 HC RX 637 (ALT 250 FOR IP): Performed by: FAMILY MEDICINE

## 2018-03-25 PROCEDURE — 6360000002 HC RX W HCPCS: Performed by: INTERNAL MEDICINE

## 2018-03-25 PROCEDURE — 6360000002 HC RX W HCPCS

## 2018-03-25 PROCEDURE — 6370000000 HC RX 637 (ALT 250 FOR IP): Performed by: INTERNAL MEDICINE

## 2018-03-25 PROCEDURE — 36415 COLL VENOUS BLD VENIPUNCTURE: CPT

## 2018-03-25 PROCEDURE — 80048 BASIC METABOLIC PNL TOTAL CA: CPT

## 2018-03-25 PROCEDURE — 99253 IP/OBS CNSLTJ NEW/EST LOW 45: CPT | Performed by: PSYCHIATRY & NEUROLOGY

## 2018-03-25 PROCEDURE — 87070 CULTURE OTHR SPECIMN AEROBIC: CPT

## 2018-03-25 PROCEDURE — 99233 SBSQ HOSP IP/OBS HIGH 50: CPT | Performed by: FAMILY MEDICINE

## 2018-03-25 PROCEDURE — 1200000003 HC TELEMETRY R&B

## 2018-03-25 PROCEDURE — 87077 CULTURE AEROBIC IDENTIFY: CPT

## 2018-03-25 PROCEDURE — 85027 COMPLETE CBC AUTOMATED: CPT

## 2018-03-25 PROCEDURE — 87507 IADNA-DNA/RNA PROBE TQ 12-25: CPT

## 2018-03-25 PROCEDURE — 2580000003 HC RX 258

## 2018-03-25 PROCEDURE — 93010 ELECTROCARDIOGRAM REPORT: CPT | Performed by: NUCLEAR MEDICINE

## 2018-03-25 PROCEDURE — 87186 SC STD MICRODIL/AGAR DIL: CPT

## 2018-03-25 PROCEDURE — 82948 REAGENT STRIP/BLOOD GLUCOSE: CPT

## 2018-03-25 PROCEDURE — 87205 SMEAR GRAM STAIN: CPT

## 2018-03-25 PROCEDURE — 2580000003 HC RX 258: Performed by: INTERNAL MEDICINE

## 2018-03-25 PROCEDURE — 83605 ASSAY OF LACTIC ACID: CPT

## 2018-03-25 RX ORDER — NICOTINE 21 MG/24HR
1 PATCH, TRANSDERMAL 24 HOURS TRANSDERMAL DAILY
Status: DISCONTINUED | OUTPATIENT
Start: 2018-03-25 | End: 2018-03-29 | Stop reason: HOSPADM

## 2018-03-25 RX ORDER — INSULIN GLARGINE 100 [IU]/ML
40 INJECTION, SOLUTION SUBCUTANEOUS DAILY
Status: DISCONTINUED | OUTPATIENT
Start: 2018-03-25 | End: 2018-03-27

## 2018-03-25 RX ORDER — SODIUM HYPOCHLORITE 1.25 MG/ML
SOLUTION TOPICAL DAILY
Status: DISCONTINUED | OUTPATIENT
Start: 2018-03-25 | End: 2018-03-29 | Stop reason: HOSPADM

## 2018-03-25 RX ADMIN — VANCOMYCIN HYDROCHLORIDE 1500 MG: 1 INJECTION, POWDER, LYOPHILIZED, FOR SOLUTION INTRAVENOUS at 05:30

## 2018-03-25 RX ADMIN — INSULIN LISPRO 10 UNITS: 100 INJECTION, SOLUTION INTRAVENOUS; SUBCUTANEOUS at 12:11

## 2018-03-25 RX ADMIN — CEFEPIME HYDROCHLORIDE 2 G: 2 INJECTION, POWDER, FOR SOLUTION INTRAVENOUS at 03:00

## 2018-03-25 RX ADMIN — INSULIN GLARGINE 40 UNITS: 100 INJECTION, SOLUTION SUBCUTANEOUS at 10:36

## 2018-03-25 RX ADMIN — INSULIN LISPRO 4 UNITS: 100 INJECTION, SOLUTION INTRAVENOUS; SUBCUTANEOUS at 16:36

## 2018-03-25 RX ADMIN — INSULIN LISPRO 10 UNITS: 100 INJECTION, SOLUTION INTRAVENOUS; SUBCUTANEOUS at 16:43

## 2018-03-25 RX ADMIN — VANCOMYCIN HYDROCHLORIDE 1500 MG: 1 INJECTION, POWDER, LYOPHILIZED, FOR SOLUTION INTRAVENOUS at 16:41

## 2018-03-25 RX ADMIN — MIRTAZAPINE 15 MG: 15 TABLET, FILM COATED ORAL at 20:20

## 2018-03-25 RX ADMIN — CEFEPIME HYDROCHLORIDE 2 G: 2 INJECTION, POWDER, FOR SOLUTION INTRAVENOUS at 10:53

## 2018-03-25 RX ADMIN — ENOXAPARIN SODIUM 40 MG: 40 INJECTION SUBCUTANEOUS at 20:23

## 2018-03-25 RX ADMIN — INSULIN LISPRO 10 UNITS: 100 INJECTION, SOLUTION INTRAVENOUS; SUBCUTANEOUS at 10:36

## 2018-03-25 RX ADMIN — CEFEPIME HYDROCHLORIDE 2 G: 2 INJECTION, POWDER, FOR SOLUTION INTRAVENOUS at 18:20

## 2018-03-25 RX ADMIN — ARIPIPRAZOLE 10 MG: 10 TABLET ORAL at 10:53

## 2018-03-25 RX ADMIN — Medication 2 UNITS: at 20:25

## 2018-03-25 RX ADMIN — INSULIN LISPRO 8 UNITS: 100 INJECTION, SOLUTION INTRAVENOUS; SUBCUTANEOUS at 12:07

## 2018-03-25 RX ADMIN — SERTRALINE 150 MG: 100 TABLET, FILM COATED ORAL at 10:53

## 2018-03-25 RX ADMIN — DAKIN'S SOLUTION 0.125% (QUARTER STRENGTH): 0.12 SOLUTION at 15:37

## 2018-03-25 ASSESSMENT — PAIN SCALES - GENERAL
PAINLEVEL_OUTOF10: 0
PAINLEVEL_OUTOF10: 0

## 2018-03-26 LAB
ANION GAP SERPL CALCULATED.3IONS-SCNC: 14 MEQ/L (ref 8–16)
BASOPHILS # BLD: 0.3 %
BASOPHILS ABSOLUTE: 0 THOU/MM3 (ref 0–0.1)
BUN BLDV-MCNC: 8 MG/DL (ref 7–22)
CALCIUM SERPL-MCNC: 8.4 MG/DL (ref 8.5–10.5)
CHLORIDE BLD-SCNC: 102 MEQ/L (ref 98–111)
CO2: 21 MEQ/L (ref 23–33)
CREAT SERPL-MCNC: 0.6 MG/DL (ref 0.4–1.2)
EOSINOPHIL # BLD: 2.4 %
EOSINOPHILS ABSOLUTE: 0.3 THOU/MM3 (ref 0–0.4)
GFR SERPL CREATININE-BSD FRML MDRD: > 90 ML/MIN/1.73M2
GLUCOSE BLD-MCNC: 163 MG/DL (ref 70–108)
GLUCOSE BLD-MCNC: 182 MG/DL (ref 70–108)
GLUCOSE BLD-MCNC: 222 MG/DL (ref 70–108)
GLUCOSE BLD-MCNC: 234 MG/DL (ref 70–108)
GLUCOSE BLD-MCNC: 246 MG/DL (ref 70–108)
HCT VFR BLD CALC: 33 % (ref 42–52)
HEMOGLOBIN: 11.6 GM/DL (ref 14–18)
LYMPHOCYTES # BLD: 16 %
LYMPHOCYTES ABSOLUTE: 1.9 THOU/MM3 (ref 1–4.8)
MCH RBC QN AUTO: 29.7 PG (ref 27–31)
MCHC RBC AUTO-ENTMCNC: 35.1 GM/DL (ref 33–37)
MCV RBC AUTO: 84.8 FL (ref 80–94)
MONOCYTES # BLD: 10.2 %
MONOCYTES ABSOLUTE: 1.2 THOU/MM3 (ref 0.4–1.3)
NUCLEATED RED BLOOD CELLS: 0 /100 WBC
PDW BLD-RTO: 14.3 % (ref 11.5–14.5)
PLATELET # BLD: 213 THOU/MM3 (ref 130–400)
PMV BLD AUTO: 8.1 FL (ref 7.4–10.4)
POTASSIUM SERPL-SCNC: 3.5 MEQ/L (ref 3.5–5.2)
RBC # BLD: 3.9 MILL/MM3 (ref 4.7–6.1)
SEG NEUTROPHILS: 71.1 %
SEGMENTED NEUTROPHILS ABSOLUTE COUNT: 8.5 THOU/MM3 (ref 1.8–7.7)
SODIUM BLD-SCNC: 137 MEQ/L (ref 135–145)
TSH SERPL DL<=0.05 MIU/L-ACNC: 1.31 UIU/ML (ref 0.4–4.2)
VANCOMYCIN TROUGH: 9.7 UG/ML (ref 5–15)
WBC # BLD: 12 THOU/MM3 (ref 4.8–10.8)

## 2018-03-26 PROCEDURE — 84443 ASSAY THYROID STIM HORMONE: CPT

## 2018-03-26 PROCEDURE — 80048 BASIC METABOLIC PNL TOTAL CA: CPT

## 2018-03-26 PROCEDURE — 1200000003 HC TELEMETRY R&B

## 2018-03-26 PROCEDURE — 6360000002 HC RX W HCPCS

## 2018-03-26 PROCEDURE — 2580000003 HC RX 258

## 2018-03-26 PROCEDURE — 85025 COMPLETE CBC W/AUTO DIFF WBC: CPT

## 2018-03-26 PROCEDURE — 6360000002 HC RX W HCPCS: Performed by: INTERNAL MEDICINE

## 2018-03-26 PROCEDURE — 2580000003 HC RX 258: Performed by: INTERNAL MEDICINE

## 2018-03-26 PROCEDURE — 82948 REAGENT STRIP/BLOOD GLUCOSE: CPT

## 2018-03-26 PROCEDURE — 99232 SBSQ HOSP IP/OBS MODERATE 35: CPT | Performed by: FAMILY MEDICINE

## 2018-03-26 PROCEDURE — 99231 SBSQ HOSP IP/OBS SF/LOW 25: CPT | Performed by: PHYSICIAN ASSISTANT

## 2018-03-26 PROCEDURE — 6370000000 HC RX 637 (ALT 250 FOR IP): Performed by: FAMILY MEDICINE

## 2018-03-26 PROCEDURE — 36415 COLL VENOUS BLD VENIPUNCTURE: CPT

## 2018-03-26 PROCEDURE — 80202 ASSAY OF VANCOMYCIN: CPT

## 2018-03-26 PROCEDURE — 6370000000 HC RX 637 (ALT 250 FOR IP): Performed by: INTERNAL MEDICINE

## 2018-03-26 RX ADMIN — SODIUM CHLORIDE: 9 INJECTION, SOLUTION INTRAVENOUS at 00:11

## 2018-03-26 RX ADMIN — SODIUM CHLORIDE: 9 INJECTION, SOLUTION INTRAVENOUS at 22:42

## 2018-03-26 RX ADMIN — INSULIN LISPRO 4 UNITS: 100 INJECTION, SOLUTION INTRAVENOUS; SUBCUTANEOUS at 07:50

## 2018-03-26 RX ADMIN — DAKIN'S SOLUTION 0.125% (QUARTER STRENGTH): 0.12 SOLUTION at 12:08

## 2018-03-26 RX ADMIN — INSULIN GLARGINE 40 UNITS: 100 INJECTION, SOLUTION SUBCUTANEOUS at 07:52

## 2018-03-26 RX ADMIN — ARIPIPRAZOLE 10 MG: 10 TABLET ORAL at 07:53

## 2018-03-26 RX ADMIN — SODIUM CHLORIDE: 9 INJECTION, SOLUTION INTRAVENOUS at 07:53

## 2018-03-26 RX ADMIN — CEFEPIME HYDROCHLORIDE 2 G: 2 INJECTION, POWDER, FOR SOLUTION INTRAVENOUS at 20:50

## 2018-03-26 RX ADMIN — MIRTAZAPINE 15 MG: 15 TABLET, FILM COATED ORAL at 20:14

## 2018-03-26 RX ADMIN — INSULIN LISPRO 10 UNITS: 100 INJECTION, SOLUTION INTRAVENOUS; SUBCUTANEOUS at 12:07

## 2018-03-26 RX ADMIN — INSULIN LISPRO 2 UNITS: 100 INJECTION, SOLUTION INTRAVENOUS; SUBCUTANEOUS at 11:56

## 2018-03-26 RX ADMIN — VANCOMYCIN HYDROCHLORIDE 1500 MG: 1 INJECTION, POWDER, LYOPHILIZED, FOR SOLUTION INTRAVENOUS at 04:37

## 2018-03-26 RX ADMIN — CEFEPIME HYDROCHLORIDE 2 G: 2 INJECTION, POWDER, FOR SOLUTION INTRAVENOUS at 10:47

## 2018-03-26 RX ADMIN — ENOXAPARIN SODIUM 40 MG: 40 INJECTION SUBCUTANEOUS at 22:03

## 2018-03-26 RX ADMIN — CEFEPIME HYDROCHLORIDE 2 G: 2 INJECTION, POWDER, FOR SOLUTION INTRAVENOUS at 02:14

## 2018-03-26 RX ADMIN — INSULIN LISPRO 10 UNITS: 100 INJECTION, SOLUTION INTRAVENOUS; SUBCUTANEOUS at 16:56

## 2018-03-26 RX ADMIN — SERTRALINE 150 MG: 100 TABLET, FILM COATED ORAL at 07:53

## 2018-03-26 RX ADMIN — Medication 2 UNITS: at 20:17

## 2018-03-26 RX ADMIN — DEXTROSE MONOHYDRATE 1750 MG: 50 INJECTION, SOLUTION INTRAVENOUS at 16:20

## 2018-03-26 RX ADMIN — INSULIN LISPRO 4 UNITS: 100 INJECTION, SOLUTION INTRAVENOUS; SUBCUTANEOUS at 16:54

## 2018-03-26 RX ADMIN — INSULIN LISPRO 10 UNITS: 100 INJECTION, SOLUTION INTRAVENOUS; SUBCUTANEOUS at 07:52

## 2018-03-26 RX ADMIN — ONDANSETRON 4 MG: 4 TABLET, ORALLY DISINTEGRATING ORAL at 19:52

## 2018-03-26 ASSESSMENT — PAIN SCALES - GENERAL: PAINLEVEL_OUTOF10: 0

## 2018-03-27 LAB
ANION GAP SERPL CALCULATED.3IONS-SCNC: 12 MEQ/L (ref 8–16)
BASOPHILS # BLD: 0.4 %
BASOPHILS ABSOLUTE: 0 THOU/MM3 (ref 0–0.1)
BUN BLDV-MCNC: 6 MG/DL (ref 7–22)
CALCIUM SERPL-MCNC: 8.1 MG/DL (ref 8.5–10.5)
CHLORIDE BLD-SCNC: 103 MEQ/L (ref 98–111)
CO2: 24 MEQ/L (ref 23–33)
CREAT SERPL-MCNC: 0.7 MG/DL (ref 0.4–1.2)
EOSINOPHIL # BLD: 3.1 %
EOSINOPHILS ABSOLUTE: 0.3 THOU/MM3 (ref 0–0.4)
GFR SERPL CREATININE-BSD FRML MDRD: > 90 ML/MIN/1.73M2
GLUCOSE BLD-MCNC: 145 MG/DL (ref 70–108)
GLUCOSE BLD-MCNC: 147 MG/DL (ref 70–108)
GLUCOSE BLD-MCNC: 202 MG/DL (ref 70–108)
GLUCOSE BLD-MCNC: 209 MG/DL (ref 70–108)
GLUCOSE BLD-MCNC: 229 MG/DL (ref 70–108)
HCT VFR BLD CALC: 33.4 % (ref 42–52)
HEMOGLOBIN: 11.7 GM/DL (ref 14–18)
LYMPHOCYTES # BLD: 20.2 %
LYMPHOCYTES ABSOLUTE: 2.1 THOU/MM3 (ref 1–4.8)
MCH RBC QN AUTO: 30.3 PG (ref 27–31)
MCHC RBC AUTO-ENTMCNC: 35.2 GM/DL (ref 33–37)
MCV RBC AUTO: 86.2 FL (ref 80–94)
MONOCYTES # BLD: 9.1 %
MONOCYTES ABSOLUTE: 0.9 THOU/MM3 (ref 0.4–1.3)
NUCLEATED RED BLOOD CELLS: 0 /100 WBC
PDW BLD-RTO: 13.9 % (ref 11.5–14.5)
PLATELET # BLD: 228 THOU/MM3 (ref 130–400)
PMV BLD AUTO: 8.1 FL (ref 7.4–10.4)
POTASSIUM SERPL-SCNC: 3.7 MEQ/L (ref 3.5–5.2)
RBC # BLD: 3.87 MILL/MM3 (ref 4.7–6.1)
SEG NEUTROPHILS: 67.2 %
SEGMENTED NEUTROPHILS ABSOLUTE COUNT: 7 THOU/MM3 (ref 1.8–7.7)
SODIUM BLD-SCNC: 139 MEQ/L (ref 135–145)
WBC # BLD: 10.4 THOU/MM3 (ref 4.8–10.8)

## 2018-03-27 PROCEDURE — 85025 COMPLETE CBC W/AUTO DIFF WBC: CPT

## 2018-03-27 PROCEDURE — 2580000003 HC RX 258: Performed by: INTERNAL MEDICINE

## 2018-03-27 PROCEDURE — 36415 COLL VENOUS BLD VENIPUNCTURE: CPT

## 2018-03-27 PROCEDURE — 1200000003 HC TELEMETRY R&B

## 2018-03-27 PROCEDURE — 82948 REAGENT STRIP/BLOOD GLUCOSE: CPT

## 2018-03-27 PROCEDURE — 80048 BASIC METABOLIC PNL TOTAL CA: CPT

## 2018-03-27 PROCEDURE — 6360000002 HC RX W HCPCS: Performed by: INTERNAL MEDICINE

## 2018-03-27 PROCEDURE — 99232 SBSQ HOSP IP/OBS MODERATE 35: CPT | Performed by: FAMILY MEDICINE

## 2018-03-27 PROCEDURE — 6370000000 HC RX 637 (ALT 250 FOR IP): Performed by: FAMILY MEDICINE

## 2018-03-27 PROCEDURE — 99231 SBSQ HOSP IP/OBS SF/LOW 25: CPT | Performed by: PSYCHIATRY & NEUROLOGY

## 2018-03-27 PROCEDURE — 6370000000 HC RX 637 (ALT 250 FOR IP): Performed by: INTERNAL MEDICINE

## 2018-03-27 RX ORDER — INSULIN GLARGINE 100 [IU]/ML
45 INJECTION, SOLUTION SUBCUTANEOUS DAILY
Status: DISCONTINUED | OUTPATIENT
Start: 2018-03-27 | End: 2018-03-28

## 2018-03-27 RX ADMIN — INSULIN LISPRO 4 UNITS: 100 INJECTION, SOLUTION INTRAVENOUS; SUBCUTANEOUS at 07:56

## 2018-03-27 RX ADMIN — CEFEPIME HYDROCHLORIDE 2 G: 2 INJECTION, POWDER, FOR SOLUTION INTRAVENOUS at 13:11

## 2018-03-27 RX ADMIN — DEXTROSE MONOHYDRATE 1750 MG: 50 INJECTION, SOLUTION INTRAVENOUS at 17:41

## 2018-03-27 RX ADMIN — Medication 2 UNITS: at 20:03

## 2018-03-27 RX ADMIN — INSULIN GLARGINE 45 UNITS: 100 INJECTION, SOLUTION SUBCUTANEOUS at 08:10

## 2018-03-27 RX ADMIN — INSULIN LISPRO 2 UNITS: 100 INJECTION, SOLUTION INTRAVENOUS; SUBCUTANEOUS at 11:58

## 2018-03-27 RX ADMIN — ARIPIPRAZOLE 10 MG: 10 TABLET ORAL at 07:55

## 2018-03-27 RX ADMIN — INSULIN LISPRO 2 UNITS: 100 INJECTION, SOLUTION INTRAVENOUS; SUBCUTANEOUS at 16:40

## 2018-03-27 RX ADMIN — CEFEPIME HYDROCHLORIDE 2 G: 2 INJECTION, POWDER, FOR SOLUTION INTRAVENOUS at 04:44

## 2018-03-27 RX ADMIN — Medication 10 ML: at 21:22

## 2018-03-27 RX ADMIN — CEFEPIME HYDROCHLORIDE 2 G: 2 INJECTION, POWDER, FOR SOLUTION INTRAVENOUS at 21:24

## 2018-03-27 RX ADMIN — DAKIN'S SOLUTION 0.125% (QUARTER STRENGTH): 0.12 SOLUTION at 10:42

## 2018-03-27 RX ADMIN — DEXTROSE MONOHYDRATE 1750 MG: 50 INJECTION, SOLUTION INTRAVENOUS at 05:22

## 2018-03-27 RX ADMIN — ENOXAPARIN SODIUM 40 MG: 40 INJECTION SUBCUTANEOUS at 21:22

## 2018-03-27 RX ADMIN — MIRTAZAPINE 15 MG: 15 TABLET, FILM COATED ORAL at 21:22

## 2018-03-27 RX ADMIN — SERTRALINE 150 MG: 100 TABLET, FILM COATED ORAL at 07:55

## 2018-03-28 PROBLEM — L03.90 SEPSIS DUE TO CELLULITIS (HCC): Status: RESOLVED | Noted: 2018-03-24 | Resolved: 2018-03-28

## 2018-03-28 PROBLEM — A41.9 SEPSIS (HCC): Status: RESOLVED | Noted: 2018-03-24 | Resolved: 2018-03-28

## 2018-03-28 LAB
AEROBIC CULTURE: ABNORMAL
GLUCOSE BLD-MCNC: 133 MG/DL (ref 70–108)
GLUCOSE BLD-MCNC: 144 MG/DL (ref 70–108)
GLUCOSE BLD-MCNC: 171 MG/DL (ref 70–108)
GLUCOSE BLD-MCNC: 207 MG/DL (ref 70–108)
GRAM STAIN RESULT: ABNORMAL
ORGANISM: ABNORMAL
ORGANISM: ABNORMAL

## 2018-03-28 PROCEDURE — 82948 REAGENT STRIP/BLOOD GLUCOSE: CPT

## 2018-03-28 PROCEDURE — 6370000000 HC RX 637 (ALT 250 FOR IP): Performed by: INTERNAL MEDICINE

## 2018-03-28 PROCEDURE — 99233 SBSQ HOSP IP/OBS HIGH 50: CPT | Performed by: INTERNAL MEDICINE

## 2018-03-28 PROCEDURE — 6360000002 HC RX W HCPCS: Performed by: INTERNAL MEDICINE

## 2018-03-28 PROCEDURE — 6370000000 HC RX 637 (ALT 250 FOR IP): Performed by: FAMILY MEDICINE

## 2018-03-28 PROCEDURE — 2580000003 HC RX 258: Performed by: INTERNAL MEDICINE

## 2018-03-28 PROCEDURE — 1200000003 HC TELEMETRY R&B

## 2018-03-28 RX ORDER — POTASSIUM CHLORIDE 20MEQ/15ML
40 LIQUID (ML) ORAL PRN
Status: DISCONTINUED | OUTPATIENT
Start: 2018-03-28 | End: 2018-03-29 | Stop reason: HOSPADM

## 2018-03-28 RX ORDER — LINEZOLID 600 MG/1
600 TABLET, FILM COATED ORAL EVERY 12 HOURS SCHEDULED
Status: DISCONTINUED | OUTPATIENT
Start: 2018-03-28 | End: 2018-03-29 | Stop reason: HOSPADM

## 2018-03-28 RX ORDER — POTASSIUM CHLORIDE 7.45 MG/ML
10 INJECTION INTRAVENOUS PRN
Status: DISCONTINUED | OUTPATIENT
Start: 2018-03-28 | End: 2018-03-29 | Stop reason: HOSPADM

## 2018-03-28 RX ORDER — POTASSIUM CHLORIDE 20 MEQ/1
40 TABLET, EXTENDED RELEASE ORAL PRN
Status: DISCONTINUED | OUTPATIENT
Start: 2018-03-28 | End: 2018-03-29 | Stop reason: HOSPADM

## 2018-03-28 RX ORDER — INSULIN GLARGINE 100 [IU]/ML
50 INJECTION, SOLUTION SUBCUTANEOUS DAILY
Status: DISCONTINUED | OUTPATIENT
Start: 2018-03-29 | End: 2018-03-29

## 2018-03-28 RX ADMIN — SERTRALINE 150 MG: 100 TABLET, FILM COATED ORAL at 08:49

## 2018-03-28 RX ADMIN — CEFEPIME HYDROCHLORIDE 2 G: 2 INJECTION, POWDER, FOR SOLUTION INTRAVENOUS at 05:37

## 2018-03-28 RX ADMIN — Medication 10 ML: at 08:50

## 2018-03-28 RX ADMIN — INSULIN LISPRO 2 UNITS: 100 INJECTION, SOLUTION INTRAVENOUS; SUBCUTANEOUS at 11:44

## 2018-03-28 RX ADMIN — INSULIN LISPRO 4 UNITS: 100 INJECTION, SOLUTION INTRAVENOUS; SUBCUTANEOUS at 08:47

## 2018-03-28 RX ADMIN — DEXTROSE MONOHYDRATE 1750 MG: 50 INJECTION, SOLUTION INTRAVENOUS at 06:28

## 2018-03-28 RX ADMIN — ENOXAPARIN SODIUM 40 MG: 40 INJECTION SUBCUTANEOUS at 20:25

## 2018-03-28 RX ADMIN — INSULIN LISPRO 2 UNITS: 100 INJECTION, SOLUTION INTRAVENOUS; SUBCUTANEOUS at 16:57

## 2018-03-28 RX ADMIN — MIRTAZAPINE 15 MG: 15 TABLET, FILM COATED ORAL at 20:25

## 2018-03-28 RX ADMIN — Medication 10 ML: at 20:25

## 2018-03-28 RX ADMIN — INSULIN GLARGINE 45 UNITS: 100 INJECTION, SOLUTION SUBCUTANEOUS at 08:47

## 2018-03-28 RX ADMIN — LINEZOLID 600 MG: 600 TABLET, FILM COATED ORAL at 20:24

## 2018-03-28 RX ADMIN — DAKIN'S SOLUTION 0.125% (QUARTER STRENGTH): 0.12 SOLUTION at 08:49

## 2018-03-28 RX ADMIN — ARIPIPRAZOLE 10 MG: 10 TABLET ORAL at 08:49

## 2018-03-28 RX ADMIN — ACETAMINOPHEN 650 MG: 325 TABLET ORAL at 23:55

## 2018-03-28 ASSESSMENT — PAIN SCALES - GENERAL: PAINLEVEL_OUTOF10: 10

## 2018-03-29 ENCOUNTER — APPOINTMENT (OUTPATIENT)
Dept: GENERAL RADIOLOGY | Age: 48
DRG: 872 | End: 2018-03-29
Payer: MEDICARE

## 2018-03-29 VITALS
OXYGEN SATURATION: 98 % | BODY MASS INDEX: 35.6 KG/M2 | TEMPERATURE: 98.1 F | RESPIRATION RATE: 16 BRPM | WEIGHT: 221.5 LBS | HEIGHT: 66 IN | HEART RATE: 88 BPM | SYSTOLIC BLOOD PRESSURE: 127 MMHG | DIASTOLIC BLOOD PRESSURE: 69 MMHG

## 2018-03-29 LAB
ALBUMIN SERPL-MCNC: 2.9 G/DL (ref 3.5–5.1)
ALP BLD-CCNC: 86 U/L (ref 38–126)
ALT SERPL-CCNC: 7 U/L (ref 11–66)
ANION GAP SERPL CALCULATED.3IONS-SCNC: 15 MEQ/L (ref 8–16)
AST SERPL-CCNC: 15 U/L (ref 5–40)
BASOPHILS # BLD: 0.5 %
BASOPHILS ABSOLUTE: 0.1 THOU/MM3 (ref 0–0.1)
BILIRUB SERPL-MCNC: 0.4 MG/DL (ref 0.3–1.2)
BUN BLDV-MCNC: 7 MG/DL (ref 7–22)
CALCIUM SERPL-MCNC: 8.6 MG/DL (ref 8.5–10.5)
CHLORIDE BLD-SCNC: 94 MEQ/L (ref 98–111)
CO2: 25 MEQ/L (ref 23–33)
CREAT SERPL-MCNC: 0.6 MG/DL (ref 0.4–1.2)
EOSINOPHIL # BLD: 1 %
EOSINOPHILS ABSOLUTE: 0.2 THOU/MM3 (ref 0–0.4)
GFR SERPL CREATININE-BSD FRML MDRD: > 90 ML/MIN/1.73M2
GLUCOSE BLD-MCNC: 172 MG/DL (ref 70–108)
GLUCOSE BLD-MCNC: 215 MG/DL (ref 70–108)
GLUCOSE BLD-MCNC: 235 MG/DL (ref 70–108)
HCT VFR BLD CALC: 36 % (ref 42–52)
HEMOGLOBIN: 12.4 GM/DL (ref 14–18)
LACTIC ACID: 1.3 MMOL/L (ref 0.5–2.2)
LYMPHOCYTES # BLD: 9 %
LYMPHOCYTES ABSOLUTE: 1.4 THOU/MM3 (ref 1–4.8)
MAGNESIUM: 2 MG/DL (ref 1.6–2.4)
MCH RBC QN AUTO: 29.3 PG (ref 27–31)
MCHC RBC AUTO-ENTMCNC: 34.3 GM/DL (ref 33–37)
MCV RBC AUTO: 85.3 FL (ref 80–94)
MONOCYTES # BLD: 6.2 %
MONOCYTES ABSOLUTE: 1 THOU/MM3 (ref 0.4–1.3)
NUCLEATED RED BLOOD CELLS: 0 /100 WBC
PDW BLD-RTO: 13.8 % (ref 11.5–14.5)
PHOSPHORUS: 3.7 MG/DL (ref 2.4–4.7)
PLATELET # BLD: 341 THOU/MM3 (ref 130–400)
PMV BLD AUTO: 7.6 FL (ref 7.4–10.4)
POTASSIUM SERPL-SCNC: 3.8 MEQ/L (ref 3.5–5.2)
RBC # BLD: 4.22 MILL/MM3 (ref 4.7–6.1)
SEG NEUTROPHILS: 83.3 %
SEGMENTED NEUTROPHILS ABSOLUTE COUNT: 13.1 THOU/MM3 (ref 1.8–7.7)
SODIUM BLD-SCNC: 134 MEQ/L (ref 135–145)
TOTAL PROTEIN: 7.3 G/DL (ref 6.1–8)
WBC # BLD: 15.7 THOU/MM3 (ref 4.8–10.8)

## 2018-03-29 PROCEDURE — G8987 SELF CARE CURRENT STATUS: HCPCS

## 2018-03-29 PROCEDURE — G8978 MOBILITY CURRENT STATUS: HCPCS

## 2018-03-29 PROCEDURE — 97530 THERAPEUTIC ACTIVITIES: CPT

## 2018-03-29 PROCEDURE — 6360000002 HC RX W HCPCS: Performed by: INTERNAL MEDICINE

## 2018-03-29 PROCEDURE — 2580000003 HC RX 258: Performed by: INTERNAL MEDICINE

## 2018-03-29 PROCEDURE — 6370000000 HC RX 637 (ALT 250 FOR IP): Performed by: INTERNAL MEDICINE

## 2018-03-29 PROCEDURE — 85025 COMPLETE CBC W/AUTO DIFF WBC: CPT

## 2018-03-29 PROCEDURE — 97535 SELF CARE MNGMENT TRAINING: CPT

## 2018-03-29 PROCEDURE — 82948 REAGENT STRIP/BLOOD GLUCOSE: CPT

## 2018-03-29 PROCEDURE — 83735 ASSAY OF MAGNESIUM: CPT

## 2018-03-29 PROCEDURE — 36415 COLL VENOUS BLD VENIPUNCTURE: CPT

## 2018-03-29 PROCEDURE — 74018 RADEX ABDOMEN 1 VIEW: CPT

## 2018-03-29 PROCEDURE — G8988 SELF CARE GOAL STATUS: HCPCS

## 2018-03-29 PROCEDURE — 80053 COMPREHEN METABOLIC PANEL: CPT

## 2018-03-29 PROCEDURE — G8979 MOBILITY GOAL STATUS: HCPCS

## 2018-03-29 PROCEDURE — 83605 ASSAY OF LACTIC ACID: CPT

## 2018-03-29 PROCEDURE — 99239 HOSP IP/OBS DSCHRG MGMT >30: CPT | Performed by: INTERNAL MEDICINE

## 2018-03-29 PROCEDURE — 84100 ASSAY OF PHOSPHORUS: CPT

## 2018-03-29 PROCEDURE — 97166 OT EVAL MOD COMPLEX 45 MIN: CPT

## 2018-03-29 PROCEDURE — 97162 PT EVAL MOD COMPLEX 30 MIN: CPT

## 2018-03-29 RX ORDER — LANCETS 30 GAUGE
EACH MISCELLANEOUS
Qty: 200 EACH | Refills: 1 | Status: SHIPPED | OUTPATIENT
Start: 2018-03-29 | End: 2018-04-23 | Stop reason: SDUPTHER

## 2018-03-29 RX ORDER — NICOTINE 21 MG/24HR
1 PATCH, TRANSDERMAL 24 HOURS TRANSDERMAL DAILY
Qty: 14 PATCH | Refills: 0 | Status: SHIPPED | OUTPATIENT
Start: 2018-03-30 | End: 2018-08-23

## 2018-03-29 RX ORDER — INSULIN GLARGINE 100 [IU]/ML
30 INJECTION, SOLUTION SUBCUTANEOUS 2 TIMES DAILY
Qty: 3 VIAL | Refills: 0 | Status: ON HOLD | OUTPATIENT
Start: 2018-03-29 | End: 2018-04-05 | Stop reason: HOSPADM

## 2018-03-29 RX ORDER — ONDANSETRON 2 MG/ML
4 INJECTION INTRAMUSCULAR; INTRAVENOUS EVERY 6 HOURS PRN
Status: DISCONTINUED | OUTPATIENT
Start: 2018-03-29 | End: 2018-03-29

## 2018-03-29 RX ORDER — SERTRALINE HYDROCHLORIDE 100 MG/1
150 TABLET, FILM COATED ORAL DAILY
Qty: 45 TABLET | Refills: 0 | Status: SHIPPED | OUTPATIENT
Start: 2018-03-29 | End: 2018-04-23 | Stop reason: SDUPTHER

## 2018-03-29 RX ORDER — ARIPIPRAZOLE 10 MG/1
10 TABLET ORAL DAILY
Qty: 30 TABLET | Refills: 0 | Status: ON HOLD | OUTPATIENT
Start: 2018-03-29 | End: 2018-04-05 | Stop reason: HOSPADM

## 2018-03-29 RX ORDER — INSULIN GLARGINE 100 [IU]/ML
30 INJECTION, SOLUTION SUBCUTANEOUS 2 TIMES DAILY
Status: DISCONTINUED | OUTPATIENT
Start: 2018-03-29 | End: 2018-03-29 | Stop reason: HOSPADM

## 2018-03-29 RX ORDER — LINEZOLID 600 MG/1
600 TABLET, FILM COATED ORAL EVERY 12 HOURS SCHEDULED
Qty: 10 TABLET | Refills: 0 | Status: ON HOLD | OUTPATIENT
Start: 2018-03-29 | End: 2018-04-05 | Stop reason: HOSPADM

## 2018-03-29 RX ORDER — MIRTAZAPINE 15 MG/1
15 TABLET, FILM COATED ORAL NIGHTLY
Qty: 30 TABLET | Refills: 0 | Status: SHIPPED | OUTPATIENT
Start: 2018-03-29 | End: 2018-04-23 | Stop reason: SDUPTHER

## 2018-03-29 RX ADMIN — INSULIN LISPRO 4 UNITS: 100 INJECTION, SOLUTION INTRAVENOUS; SUBCUTANEOUS at 08:59

## 2018-03-29 RX ADMIN — Medication 10 ML: at 08:59

## 2018-03-29 RX ADMIN — LINEZOLID 600 MG: 600 TABLET, FILM COATED ORAL at 08:58

## 2018-03-29 RX ADMIN — SERTRALINE 150 MG: 100 TABLET, FILM COATED ORAL at 08:58

## 2018-03-29 RX ADMIN — DAKIN'S SOLUTION 0.125% (QUARTER STRENGTH): 0.12 SOLUTION at 08:59

## 2018-03-29 RX ADMIN — INSULIN GLARGINE 30 UNITS: 100 INJECTION, SOLUTION SUBCUTANEOUS at 09:01

## 2018-03-29 RX ADMIN — INSULIN LISPRO 2 UNITS: 100 INJECTION, SOLUTION INTRAVENOUS; SUBCUTANEOUS at 11:59

## 2018-03-29 RX ADMIN — HYDROMORPHONE HYDROCHLORIDE 1 MG: 1 INJECTION, SOLUTION INTRAMUSCULAR; INTRAVENOUS; SUBCUTANEOUS at 01:56

## 2018-03-29 RX ADMIN — ONDANSETRON 4 MG: 4 TABLET, ORALLY DISINTEGRATING ORAL at 00:28

## 2018-03-29 RX ADMIN — ARIPIPRAZOLE 10 MG: 10 TABLET ORAL at 08:58

## 2018-03-29 ASSESSMENT — PAIN SCALES - GENERAL
PAINLEVEL_OUTOF10: 9
PAINLEVEL_OUTOF10: 9

## 2018-03-30 LAB
BLOOD CULTURE, ROUTINE: NORMAL
BLOOD CULTURE, ROUTINE: NORMAL
URINE DRUG PROFILE: NORMAL

## 2018-04-03 ENCOUNTER — APPOINTMENT (OUTPATIENT)
Dept: GENERAL RADIOLOGY | Age: 48
DRG: 885 | End: 2018-04-03
Payer: MEDICARE

## 2018-04-03 ENCOUNTER — HOSPITAL ENCOUNTER (INPATIENT)
Age: 48
LOS: 2 days | Discharge: HOME HEALTH | DRG: 885 | End: 2018-04-05
Attending: FAMILY MEDICINE | Admitting: INTERNAL MEDICINE
Payer: MEDICARE

## 2018-04-03 ENCOUNTER — TELEPHONE (OUTPATIENT)
Dept: INTERNAL MEDICINE CLINIC | Age: 48
End: 2018-04-03

## 2018-04-03 DIAGNOSIS — T87.43 INFECTION OF ABOVE KNEE AMPUTATION STUMP OF RIGHT LEG (HCC): Primary | ICD-10-CM

## 2018-04-03 DIAGNOSIS — D72.829 LEUKOCYTOSIS, UNSPECIFIED TYPE: ICD-10-CM

## 2018-04-03 DIAGNOSIS — R06.09 OTHER FORM OF DYSPNEA: ICD-10-CM

## 2018-04-03 PROBLEM — L03.90 SEPSIS DUE TO CELLULITIS (HCC): Status: ACTIVE | Noted: 2018-04-03

## 2018-04-03 PROBLEM — L03.90 SEPSIS DUE TO CELLULITIS (HCC): Status: ACTIVE | Noted: 2018-03-24

## 2018-04-03 PROBLEM — R65.10 SIRS (SYSTEMIC INFLAMMATORY RESPONSE SYNDROME) (HCC): Status: ACTIVE | Noted: 2018-04-03

## 2018-04-03 PROBLEM — A41.9 SEPSIS DUE TO CELLULITIS (HCC): Status: ACTIVE | Noted: 2018-04-03

## 2018-04-03 LAB
ALLEN TEST: POSITIVE
ANION GAP SERPL CALCULATED.3IONS-SCNC: 16 MEQ/L (ref 8–16)
BASE EXCESS (CALCULATED): 2.7 MMOL/L (ref -2.5–2.5)
BASOPHILS # BLD: 0.4 %
BASOPHILS ABSOLUTE: 0.1 THOU/MM3 (ref 0–0.1)
BETA-HYDROXYBUTYRATE: 0.46 MG/DL (ref 0.2–2.81)
BUN BLDV-MCNC: 11 MG/DL (ref 7–22)
CALCIUM SERPL-MCNC: 8.9 MG/DL (ref 8.5–10.5)
CHLORIDE BLD-SCNC: 93 MEQ/L (ref 98–111)
CO2: 25 MEQ/L (ref 23–33)
COLLECTED BY:: ABNORMAL
CREAT SERPL-MCNC: 0.8 MG/DL (ref 0.4–1.2)
DEVICE: ABNORMAL
EKG ATRIAL RATE: 93 BPM
EKG P AXIS: 25 DEGREES
EKG P-R INTERVAL: 162 MS
EKG Q-T INTERVAL: 370 MS
EKG QRS DURATION: 82 MS
EKG QTC CALCULATION (BAZETT): 460 MS
EKG R AXIS: 30 DEGREES
EKG T AXIS: 38 DEGREES
EKG VENTRICULAR RATE: 93 BPM
EOSINOPHIL # BLD: 2 %
EOSINOPHILS ABSOLUTE: 0.3 THOU/MM3 (ref 0–0.4)
FLU A ANTIGEN: NEGATIVE
FLU B ANTIGEN: NEGATIVE
GFR SERPL CREATININE-BSD FRML MDRD: > 90 ML/MIN/1.73M2
GLUCOSE BLD-MCNC: 397 MG/DL (ref 70–108)
HCO3: 27 MMOL/L (ref 23–28)
HCT VFR BLD CALC: 33.7 % (ref 42–52)
HEMOGLOBIN: 11.5 GM/DL (ref 14–18)
LACTIC ACID, SEPSIS: 3.3 MMOL/L (ref 0.5–1.9)
LYMPHOCYTES # BLD: 15.4 %
LYMPHOCYTES ABSOLUTE: 2.5 THOU/MM3 (ref 1–4.8)
MCH RBC QN AUTO: 28.9 PG (ref 27–31)
MCHC RBC AUTO-ENTMCNC: 34 GM/DL (ref 33–37)
MCV RBC AUTO: 84.8 FL (ref 80–94)
MONOCYTES # BLD: 6.9 %
MONOCYTES ABSOLUTE: 1.1 THOU/MM3 (ref 0.4–1.3)
NUCLEATED RED BLOOD CELLS: 0 /100 WBC
O2 SATURATION: 94 %
OSMOLALITY CALCULATION: 284.2 MOSMOL/KG (ref 275–300)
PCO2: 38 MMHG (ref 35–45)
PDW BLD-RTO: 14.3 % (ref 11.5–14.5)
PH BLOOD GAS: 7.46 (ref 7.35–7.45)
PLATELET # BLD: 388 THOU/MM3 (ref 130–400)
PMV BLD AUTO: 7.3 FL (ref 7.4–10.4)
PO2: 66 MMHG (ref 71–104)
POTASSIUM SERPL-SCNC: 3.7 MEQ/L (ref 3.5–5.2)
PRO-BNP: 77.4 PG/ML (ref 0–900)
RBC # BLD: 3.97 MILL/MM3 (ref 4.7–6.1)
SEG NEUTROPHILS: 75.3 %
SEGMENTED NEUTROPHILS ABSOLUTE COUNT: 12 THOU/MM3 (ref 1.8–7.7)
SODIUM BLD-SCNC: 134 MEQ/L (ref 135–145)
SOURCE, BLOOD GAS: ABNORMAL
TROPONIN T: < 0.01 NG/ML
WBC # BLD: 16 THOU/MM3 (ref 4.8–10.8)

## 2018-04-03 PROCEDURE — 83880 ASSAY OF NATRIURETIC PEPTIDE: CPT

## 2018-04-03 PROCEDURE — 71045 X-RAY EXAM CHEST 1 VIEW: CPT

## 2018-04-03 PROCEDURE — 99223 1ST HOSP IP/OBS HIGH 75: CPT | Performed by: INTERNAL MEDICINE

## 2018-04-03 PROCEDURE — 6360000002 HC RX W HCPCS: Performed by: FAMILY MEDICINE

## 2018-04-03 PROCEDURE — 36600 WITHDRAWAL OF ARTERIAL BLOOD: CPT

## 2018-04-03 PROCEDURE — 85025 COMPLETE CBC W/AUTO DIFF WBC: CPT

## 2018-04-03 PROCEDURE — 1200000003 HC TELEMETRY R&B

## 2018-04-03 PROCEDURE — 99284 EMERGENCY DEPT VISIT MOD MDM: CPT

## 2018-04-03 PROCEDURE — 87804 INFLUENZA ASSAY W/OPTIC: CPT

## 2018-04-03 PROCEDURE — 82803 BLOOD GASES ANY COMBINATION: CPT

## 2018-04-03 PROCEDURE — 36415 COLL VENOUS BLD VENIPUNCTURE: CPT

## 2018-04-03 PROCEDURE — 80048 BASIC METABOLIC PNL TOTAL CA: CPT

## 2018-04-03 PROCEDURE — 2580000003 HC RX 258: Performed by: FAMILY MEDICINE

## 2018-04-03 PROCEDURE — 93005 ELECTROCARDIOGRAM TRACING: CPT | Performed by: FAMILY MEDICINE

## 2018-04-03 PROCEDURE — 82010 KETONE BODYS QUAN: CPT

## 2018-04-03 PROCEDURE — 83605 ASSAY OF LACTIC ACID: CPT

## 2018-04-03 PROCEDURE — 87040 BLOOD CULTURE FOR BACTERIA: CPT

## 2018-04-03 PROCEDURE — 84484 ASSAY OF TROPONIN QUANT: CPT

## 2018-04-03 RX ORDER — 0.9 % SODIUM CHLORIDE 0.9 %
1000 INTRAVENOUS SOLUTION INTRAVENOUS ONCE
Status: COMPLETED | OUTPATIENT
Start: 2018-04-03 | End: 2018-04-03

## 2018-04-03 RX ORDER — ONDANSETRON 2 MG/ML
4 INJECTION INTRAMUSCULAR; INTRAVENOUS EVERY 6 HOURS PRN
Status: DISCONTINUED | OUTPATIENT
Start: 2018-04-03 | End: 2018-04-05 | Stop reason: HOSPADM

## 2018-04-03 RX ORDER — NICOTINE 21 MG/24HR
1 PATCH, TRANSDERMAL 24 HOURS TRANSDERMAL DAILY
Status: DISCONTINUED | OUTPATIENT
Start: 2018-04-04 | End: 2018-04-05 | Stop reason: HOSPADM

## 2018-04-03 RX ORDER — SODIUM CHLORIDE 0.9 % (FLUSH) 0.9 %
10 SYRINGE (ML) INJECTION PRN
Status: DISCONTINUED | OUTPATIENT
Start: 2018-04-03 | End: 2018-04-05 | Stop reason: HOSPADM

## 2018-04-03 RX ORDER — ACETAMINOPHEN 325 MG/1
650 TABLET ORAL EVERY 4 HOURS PRN
Status: DISCONTINUED | OUTPATIENT
Start: 2018-04-03 | End: 2018-04-05 | Stop reason: HOSPADM

## 2018-04-03 RX ORDER — DEXTROSE MONOHYDRATE 50 MG/ML
100 INJECTION, SOLUTION INTRAVENOUS PRN
Status: DISCONTINUED | OUTPATIENT
Start: 2018-04-03 | End: 2018-04-05 | Stop reason: HOSPADM

## 2018-04-03 RX ORDER — LINEZOLID 2 MG/ML
600 INJECTION, SOLUTION INTRAVENOUS EVERY 12 HOURS
Status: DISCONTINUED | OUTPATIENT
Start: 2018-04-04 | End: 2018-04-05 | Stop reason: HOSPADM

## 2018-04-03 RX ORDER — SODIUM CHLORIDE 0.9 % (FLUSH) 0.9 %
10 SYRINGE (ML) INJECTION EVERY 12 HOURS SCHEDULED
Status: DISCONTINUED | OUTPATIENT
Start: 2018-04-04 | End: 2018-04-05 | Stop reason: HOSPADM

## 2018-04-03 RX ORDER — MIRTAZAPINE 15 MG/1
15 TABLET, FILM COATED ORAL NIGHTLY
Status: DISCONTINUED | OUTPATIENT
Start: 2018-04-04 | End: 2018-04-05 | Stop reason: HOSPADM

## 2018-04-03 RX ORDER — ARIPIPRAZOLE 10 MG/1
10 TABLET ORAL DAILY
Status: DISCONTINUED | OUTPATIENT
Start: 2018-04-04 | End: 2018-04-04

## 2018-04-03 RX ORDER — IPRATROPIUM BROMIDE AND ALBUTEROL SULFATE 2.5; .5 MG/3ML; MG/3ML
1 SOLUTION RESPIRATORY (INHALATION) EVERY 4 HOURS PRN
Status: DISCONTINUED | OUTPATIENT
Start: 2018-04-03 | End: 2018-04-05 | Stop reason: HOSPADM

## 2018-04-03 RX ORDER — DEXTROSE MONOHYDRATE 25 G/50ML
12.5 INJECTION, SOLUTION INTRAVENOUS PRN
Status: DISCONTINUED | OUTPATIENT
Start: 2018-04-03 | End: 2018-04-05 | Stop reason: HOSPADM

## 2018-04-03 RX ORDER — INSULIN GLARGINE 100 [IU]/ML
30 INJECTION, SOLUTION SUBCUTANEOUS 2 TIMES DAILY
Status: DISCONTINUED | OUTPATIENT
Start: 2018-04-04 | End: 2018-04-05 | Stop reason: HOSPADM

## 2018-04-03 RX ORDER — NICOTINE POLACRILEX 4 MG
15 LOZENGE BUCCAL PRN
Status: DISCONTINUED | OUTPATIENT
Start: 2018-04-03 | End: 2018-04-05 | Stop reason: HOSPADM

## 2018-04-03 RX ORDER — SODIUM CHLORIDE 9 MG/ML
INJECTION, SOLUTION INTRAVENOUS CONTINUOUS
Status: ACTIVE | OUTPATIENT
Start: 2018-04-04 | End: 2018-04-05

## 2018-04-03 RX ORDER — 0.9 % SODIUM CHLORIDE 0.9 %
20 INTRAVENOUS SOLUTION INTRAVENOUS ONCE
Status: DISCONTINUED | OUTPATIENT
Start: 2018-04-03 | End: 2018-04-04

## 2018-04-03 RX ADMIN — VANCOMYCIN HYDROCHLORIDE 1500 MG: 1 INJECTION, POWDER, LYOPHILIZED, FOR SOLUTION INTRAVENOUS at 23:18

## 2018-04-03 RX ADMIN — SODIUM CHLORIDE 1986 ML: 9 INJECTION, SOLUTION INTRAVENOUS at 23:06

## 2018-04-03 RX ADMIN — SODIUM CHLORIDE 1000 ML: 9 INJECTION, SOLUTION INTRAVENOUS at 22:35

## 2018-04-03 ASSESSMENT — ENCOUNTER SYMPTOMS
WHEEZING: 0
SORE THROAT: 0
VOMITING: 0
EYE DISCHARGE: 0
SHORTNESS OF BREATH: 0
COUGH: 1
BACK PAIN: 0
ABDOMINAL PAIN: 0
DIARRHEA: 0
EYE REDNESS: 0
RHINORRHEA: 0
NAUSEA: 0

## 2018-04-03 ASSESSMENT — PAIN SCALES - GENERAL: PAINLEVEL_OUTOF10: 0

## 2018-04-04 ENCOUNTER — APPOINTMENT (OUTPATIENT)
Dept: CT IMAGING | Age: 48
DRG: 885 | End: 2018-04-04
Payer: MEDICARE

## 2018-04-04 LAB
ALBUMIN SERPL-MCNC: 2.8 G/DL (ref 3.5–5.1)
ANION GAP SERPL CALCULATED.3IONS-SCNC: 15 MEQ/L (ref 8–16)
ANISOCYTOSIS: ABNORMAL
AVERAGE GLUCOSE: 249 MG/DL (ref 70–126)
BASOPHILS # BLD: 0.2 %
BASOPHILS ABSOLUTE: 0 THOU/MM3 (ref 0–0.1)
BUN BLDV-MCNC: 8 MG/DL (ref 7–22)
CALCIUM SERPL-MCNC: 8.4 MG/DL (ref 8.5–10.5)
CHLORIDE BLD-SCNC: 96 MEQ/L (ref 98–111)
CO2: 23 MEQ/L (ref 23–33)
CREAT SERPL-MCNC: 0.7 MG/DL (ref 0.4–1.2)
EOSINOPHIL # BLD: 2.7 %
EOSINOPHILS ABSOLUTE: 0.4 THOU/MM3 (ref 0–0.4)
GFR SERPL CREATININE-BSD FRML MDRD: > 90 ML/MIN/1.73M2
GLUCOSE BLD-MCNC: 199 MG/DL (ref 70–108)
GLUCOSE BLD-MCNC: 221 MG/DL (ref 70–108)
GLUCOSE BLD-MCNC: 260 MG/DL (ref 70–108)
GLUCOSE BLD-MCNC: 294 MG/DL (ref 70–108)
GLUCOSE BLD-MCNC: 303 MG/DL (ref 70–108)
GLUCOSE BLD-MCNC: 368 MG/DL (ref 70–108)
HBA1C MFR BLD: 10.3 % (ref 4.4–6.4)
HCT VFR BLD CALC: 32.6 % (ref 42–52)
HEMOGLOBIN: 11.3 GM/DL (ref 14–18)
LACTIC ACID: 1.9 MMOL/L (ref 0.5–2.2)
LYMPHOCYTES # BLD: 14.1 %
LYMPHOCYTES ABSOLUTE: 2 THOU/MM3 (ref 1–4.8)
MAGNESIUM: 2.1 MG/DL (ref 1.6–2.4)
MCH RBC QN AUTO: 29.2 PG (ref 27–31)
MCHC RBC AUTO-ENTMCNC: 34.6 GM/DL (ref 33–37)
MCV RBC AUTO: 84.5 FL (ref 80–94)
MONOCYTES # BLD: 7.1 %
MONOCYTES ABSOLUTE: 1 THOU/MM3 (ref 0.4–1.3)
NUCLEATED RED BLOOD CELLS: 0 /100 WBC
PDW BLD-RTO: 14.6 % (ref 11.5–14.5)
PHOSPHORUS: 2.6 MG/DL (ref 2.4–4.7)
PLATELET # BLD: 369 THOU/MM3 (ref 130–400)
PMV BLD AUTO: 7.2 FL (ref 7.4–10.4)
POTASSIUM SERPL-SCNC: 3.5 MEQ/L (ref 3.5–5.2)
RBC # BLD: 3.85 MILL/MM3 (ref 4.7–6.1)
SEG NEUTROPHILS: 75.9 %
SEGMENTED NEUTROPHILS ABSOLUTE COUNT: 10.9 THOU/MM3 (ref 1.8–7.7)
SODIUM BLD-SCNC: 134 MEQ/L (ref 135–145)
WBC # BLD: 14.4 THOU/MM3 (ref 4.8–10.8)

## 2018-04-04 PROCEDURE — 6360000002 HC RX W HCPCS: Performed by: INTERNAL MEDICINE

## 2018-04-04 PROCEDURE — 93010 ELECTROCARDIOGRAM REPORT: CPT | Performed by: INTERNAL MEDICINE

## 2018-04-04 PROCEDURE — 85025 COMPLETE CBC W/AUTO DIFF WBC: CPT

## 2018-04-04 PROCEDURE — 36415 COLL VENOUS BLD VENIPUNCTURE: CPT

## 2018-04-04 PROCEDURE — 99253 IP/OBS CNSLTJ NEW/EST LOW 45: CPT | Performed by: PSYCHIATRY & NEUROLOGY

## 2018-04-04 PROCEDURE — 83735 ASSAY OF MAGNESIUM: CPT

## 2018-04-04 PROCEDURE — 6370000000 HC RX 637 (ALT 250 FOR IP): Performed by: PSYCHIATRY & NEUROLOGY

## 2018-04-04 PROCEDURE — 6370000000 HC RX 637 (ALT 250 FOR IP): Performed by: INTERNAL MEDICINE

## 2018-04-04 PROCEDURE — 83036 HEMOGLOBIN GLYCOSYLATED A1C: CPT

## 2018-04-04 PROCEDURE — 80069 RENAL FUNCTION PANEL: CPT

## 2018-04-04 PROCEDURE — 82948 REAGENT STRIP/BLOOD GLUCOSE: CPT

## 2018-04-04 PROCEDURE — 6370000000 HC RX 637 (ALT 250 FOR IP): Performed by: FAMILY MEDICINE

## 2018-04-04 PROCEDURE — 73701 CT LOWER EXTREMITY W/DYE: CPT

## 2018-04-04 PROCEDURE — 6360000004 HC RX CONTRAST MEDICATION: Performed by: INTERNAL MEDICINE

## 2018-04-04 PROCEDURE — 83605 ASSAY OF LACTIC ACID: CPT

## 2018-04-04 PROCEDURE — 1200000003 HC TELEMETRY R&B

## 2018-04-04 PROCEDURE — 99233 SBSQ HOSP IP/OBS HIGH 50: CPT | Performed by: FAMILY MEDICINE

## 2018-04-04 PROCEDURE — 2580000003 HC RX 258: Performed by: INTERNAL MEDICINE

## 2018-04-04 RX ORDER — PANTOPRAZOLE SODIUM 40 MG/1
40 TABLET, DELAYED RELEASE ORAL
Status: DISCONTINUED | OUTPATIENT
Start: 2018-04-04 | End: 2018-04-05 | Stop reason: HOSPADM

## 2018-04-04 RX ORDER — BENZONATATE 100 MG/1
100 CAPSULE ORAL 3 TIMES DAILY PRN
Status: DISCONTINUED | OUTPATIENT
Start: 2018-04-04 | End: 2018-04-05 | Stop reason: HOSPADM

## 2018-04-04 RX ORDER — QUETIAPINE FUMARATE 100 MG/1
100 TABLET, FILM COATED ORAL NIGHTLY
Status: DISCONTINUED | OUTPATIENT
Start: 2018-04-04 | End: 2018-04-05 | Stop reason: HOSPADM

## 2018-04-04 RX ADMIN — BENZONATATE 100 MG: 100 CAPSULE ORAL at 09:48

## 2018-04-04 RX ADMIN — Medication 2 UNITS: at 16:45

## 2018-04-04 RX ADMIN — INSULIN LISPRO 5 UNITS: 100 INJECTION, SOLUTION INTRAVENOUS; SUBCUTANEOUS at 01:28

## 2018-04-04 RX ADMIN — Medication 6 UNITS: at 07:27

## 2018-04-04 RX ADMIN — Medication 17 UNITS: at 16:45

## 2018-04-04 RX ADMIN — ARIPIPRAZOLE 10 MG: 10 TABLET ORAL at 09:03

## 2018-04-04 RX ADMIN — Medication 17 UNITS: at 11:29

## 2018-04-04 RX ADMIN — LINEZOLID 600 MG: 600 INJECTION, SOLUTION INTRAVENOUS at 12:59

## 2018-04-04 RX ADMIN — IOPAMIDOL 85 ML: 755 INJECTION, SOLUTION INTRAVENOUS at 08:34

## 2018-04-04 RX ADMIN — SODIUM CHLORIDE: 9 INJECTION, SOLUTION INTRAVENOUS at 14:11

## 2018-04-04 RX ADMIN — ENOXAPARIN SODIUM 40 MG: 40 INJECTION SUBCUTANEOUS at 09:03

## 2018-04-04 RX ADMIN — Medication 6 UNITS: at 11:28

## 2018-04-04 RX ADMIN — PANTOPRAZOLE SODIUM 40 MG: 40 TABLET, DELAYED RELEASE ORAL at 09:48

## 2018-04-04 RX ADMIN — SERTRALINE 150 MG: 100 TABLET, FILM COATED ORAL at 09:03

## 2018-04-04 RX ADMIN — INSULIN GLARGINE 30 UNITS: 100 INJECTION, SOLUTION SUBCUTANEOUS at 01:28

## 2018-04-04 RX ADMIN — INSULIN GLARGINE 30 UNITS: 100 INJECTION, SOLUTION SUBCUTANEOUS at 21:16

## 2018-04-04 RX ADMIN — Medication 10 ML: at 21:15

## 2018-04-04 RX ADMIN — INSULIN LISPRO 2 UNITS: 100 INJECTION, SOLUTION INTRAVENOUS; SUBCUTANEOUS at 21:16

## 2018-04-04 RX ADMIN — Medication 17 UNITS: at 07:26

## 2018-04-04 RX ADMIN — LINEZOLID 600 MG: 600 INJECTION, SOLUTION INTRAVENOUS at 01:32

## 2018-04-04 RX ADMIN — INSULIN GLARGINE 30 UNITS: 100 INJECTION, SOLUTION SUBCUTANEOUS at 07:26

## 2018-04-04 RX ADMIN — QUETIAPINE FUMARATE 100 MG: 100 TABLET ORAL at 21:15

## 2018-04-04 RX ADMIN — MIRTAZAPINE 15 MG: 15 TABLET, FILM COATED ORAL at 21:15

## 2018-04-04 RX ADMIN — MIRTAZAPINE 15 MG: 15 TABLET, FILM COATED ORAL at 01:25

## 2018-04-04 ASSESSMENT — PAIN SCALES - GENERAL: PAINLEVEL_OUTOF10: 0

## 2018-04-05 ENCOUNTER — TELEPHONE (OUTPATIENT)
Dept: INTERNAL MEDICINE CLINIC | Age: 48
End: 2018-04-05

## 2018-04-05 VITALS
TEMPERATURE: 98.2 F | HEART RATE: 91 BPM | RESPIRATION RATE: 14 BRPM | BODY MASS INDEX: 34.97 KG/M2 | HEIGHT: 66 IN | WEIGHT: 217.6 LBS | DIASTOLIC BLOOD PRESSURE: 63 MMHG | OXYGEN SATURATION: 97 % | SYSTOLIC BLOOD PRESSURE: 115 MMHG

## 2018-04-05 LAB
ANION GAP SERPL CALCULATED.3IONS-SCNC: 13 MEQ/L (ref 8–16)
BASOPHILS # BLD: 0.6 %
BASOPHILS ABSOLUTE: 0.1 THOU/MM3 (ref 0–0.1)
BUN BLDV-MCNC: 9 MG/DL (ref 7–22)
CALCIUM SERPL-MCNC: 8.9 MG/DL (ref 8.5–10.5)
CHLORIDE BLD-SCNC: 102 MEQ/L (ref 98–111)
CO2: 23 MEQ/L (ref 23–33)
CREAT SERPL-MCNC: 0.7 MG/DL (ref 0.4–1.2)
EOSINOPHIL # BLD: 3.8 %
EOSINOPHILS ABSOLUTE: 0.5 THOU/MM3 (ref 0–0.4)
GFR SERPL CREATININE-BSD FRML MDRD: > 90 ML/MIN/1.73M2
GLUCOSE BLD-MCNC: 241 MG/DL (ref 70–108)
GLUCOSE BLD-MCNC: 247 MG/DL (ref 70–108)
GLUCOSE BLD-MCNC: 283 MG/DL (ref 70–108)
HCT VFR BLD CALC: 33.2 % (ref 42–52)
HEMOGLOBIN: 11.4 GM/DL (ref 14–18)
LYMPHOCYTES # BLD: 22.7 %
LYMPHOCYTES ABSOLUTE: 2.7 THOU/MM3 (ref 1–4.8)
MCH RBC QN AUTO: 29.3 PG (ref 27–31)
MCHC RBC AUTO-ENTMCNC: 34.3 GM/DL (ref 33–37)
MCV RBC AUTO: 85.5 FL (ref 80–94)
MONOCYTES # BLD: 6.1 %
MONOCYTES ABSOLUTE: 0.7 THOU/MM3 (ref 0.4–1.3)
NUCLEATED RED BLOOD CELLS: 0 /100 WBC
PDW BLD-RTO: 14.4 % (ref 11.5–14.5)
PLATELET # BLD: 355 THOU/MM3 (ref 130–400)
PMV BLD AUTO: 7.3 FL (ref 7.4–10.4)
POTASSIUM SERPL-SCNC: 3.7 MEQ/L (ref 3.5–5.2)
RBC # BLD: 3.89 MILL/MM3 (ref 4.7–6.1)
SEG NEUTROPHILS: 66.8 %
SEGMENTED NEUTROPHILS ABSOLUTE COUNT: 8.1 THOU/MM3 (ref 1.8–7.7)
SODIUM BLD-SCNC: 138 MEQ/L (ref 135–145)
WBC # BLD: 12.1 THOU/MM3 (ref 4.8–10.8)

## 2018-04-05 PROCEDURE — 82948 REAGENT STRIP/BLOOD GLUCOSE: CPT

## 2018-04-05 PROCEDURE — 80048 BASIC METABOLIC PNL TOTAL CA: CPT

## 2018-04-05 PROCEDURE — 2580000003 HC RX 258: Performed by: INTERNAL MEDICINE

## 2018-04-05 PROCEDURE — 6370000000 HC RX 637 (ALT 250 FOR IP): Performed by: INTERNAL MEDICINE

## 2018-04-05 PROCEDURE — 6370000000 HC RX 637 (ALT 250 FOR IP): Performed by: FAMILY MEDICINE

## 2018-04-05 PROCEDURE — 36415 COLL VENOUS BLD VENIPUNCTURE: CPT

## 2018-04-05 PROCEDURE — 99239 HOSP IP/OBS DSCHRG MGMT >30: CPT | Performed by: FAMILY MEDICINE

## 2018-04-05 PROCEDURE — 6360000002 HC RX W HCPCS: Performed by: INTERNAL MEDICINE

## 2018-04-05 PROCEDURE — 85025 COMPLETE CBC W/AUTO DIFF WBC: CPT

## 2018-04-05 RX ORDER — BENZONATATE 100 MG/1
100 CAPSULE ORAL 3 TIMES DAILY PRN
Qty: 30 CAPSULE | Refills: 0 | Status: SHIPPED | OUTPATIENT
Start: 2018-04-05 | End: 2018-04-15

## 2018-04-05 RX ORDER — PANTOPRAZOLE SODIUM 40 MG/1
40 TABLET, DELAYED RELEASE ORAL
Qty: 30 TABLET | Refills: 0 | Status: SHIPPED | OUTPATIENT
Start: 2018-04-06 | End: 2018-04-23 | Stop reason: ALTCHOICE

## 2018-04-05 RX ORDER — QUETIAPINE FUMARATE 100 MG/1
100 TABLET, FILM COATED ORAL NIGHTLY
Qty: 30 TABLET | Refills: 0 | Status: SHIPPED | OUTPATIENT
Start: 2018-04-05 | End: 2018-04-23 | Stop reason: SDUPTHER

## 2018-04-05 RX ADMIN — LINEZOLID 600 MG: 600 INJECTION, SOLUTION INTRAVENOUS at 01:00

## 2018-04-05 RX ADMIN — LINEZOLID 600 MG: 600 INJECTION, SOLUTION INTRAVENOUS at 12:11

## 2018-04-05 RX ADMIN — Medication 4 UNITS: at 07:29

## 2018-04-05 RX ADMIN — Medication 6 UNITS: at 11:34

## 2018-04-05 RX ADMIN — ENOXAPARIN SODIUM 40 MG: 40 INJECTION SUBCUTANEOUS at 08:48

## 2018-04-05 RX ADMIN — Medication 17 UNITS: at 11:35

## 2018-04-05 RX ADMIN — Medication 17 UNITS: at 07:30

## 2018-04-05 RX ADMIN — INSULIN GLARGINE 30 UNITS: 100 INJECTION, SOLUTION SUBCUTANEOUS at 07:28

## 2018-04-05 RX ADMIN — PANTOPRAZOLE SODIUM 40 MG: 40 TABLET, DELAYED RELEASE ORAL at 05:00

## 2018-04-05 RX ADMIN — SERTRALINE 150 MG: 100 TABLET, FILM COATED ORAL at 08:47

## 2018-04-05 RX ADMIN — Medication 10 ML: at 08:47

## 2018-04-05 ASSESSMENT — PAIN SCALES - GENERAL: PAINLEVEL_OUTOF10: 0

## 2018-04-09 LAB
BLOOD CULTURE, ROUTINE: NORMAL
BLOOD CULTURE, ROUTINE: NORMAL

## 2018-04-11 ENCOUNTER — PRE-PROCEDURE TELEPHONE (OUTPATIENT)
Dept: WOUND CARE | Age: 48
End: 2018-04-11

## 2018-04-17 ENCOUNTER — OFFICE VISIT (OUTPATIENT)
Dept: INTERNAL MEDICINE CLINIC | Age: 48
End: 2018-04-17
Payer: MEDICAID

## 2018-04-17 VITALS
HEIGHT: 66 IN | HEART RATE: 94 BPM | WEIGHT: 221 LBS | DIASTOLIC BLOOD PRESSURE: 80 MMHG | SYSTOLIC BLOOD PRESSURE: 120 MMHG | BODY MASS INDEX: 35.52 KG/M2

## 2018-04-17 DIAGNOSIS — L60.2 OVERGROWN TOENAILS: ICD-10-CM

## 2018-04-17 DIAGNOSIS — F31.9 BIPOLAR 1 DISORDER (HCC): ICD-10-CM

## 2018-04-17 DIAGNOSIS — L03.90 WOUND CELLULITIS: ICD-10-CM

## 2018-04-17 DIAGNOSIS — Z89.511 HX OF RIGHT BKA (HCC): ICD-10-CM

## 2018-04-17 DIAGNOSIS — D72.829 LEUKOCYTOSIS, UNSPECIFIED TYPE: ICD-10-CM

## 2018-04-17 LAB — GLUCOSE BLD-MCNC: 315 MG/DL

## 2018-04-17 PROCEDURE — 99495 TRANSJ CARE MGMT MOD F2F 14D: CPT | Performed by: NURSE PRACTITIONER

## 2018-04-17 PROCEDURE — 36416 COLLJ CAPILLARY BLOOD SPEC: CPT | Performed by: NURSE PRACTITIONER

## 2018-04-17 PROCEDURE — 82962 GLUCOSE BLOOD TEST: CPT | Performed by: NURSE PRACTITIONER

## 2018-04-17 RX ORDER — GLUCOSAMINE HCL/CHONDROITIN SU 500-400 MG
CAPSULE ORAL
Qty: 400 STRIP | Refills: 1 | Status: SHIPPED | OUTPATIENT
Start: 2018-04-17 | End: 2018-08-23

## 2018-04-17 RX ORDER — BENZONATATE 100 MG/1
100 CAPSULE ORAL 3 TIMES DAILY PRN
COMMUNITY
End: 2018-08-23

## 2018-04-17 RX ORDER — LANCETS 30 GAUGE
EACH MISCELLANEOUS
Qty: 100 EACH | Refills: 3 | Status: SHIPPED | OUTPATIENT
Start: 2018-04-17 | End: 2018-08-23

## 2018-04-17 ASSESSMENT — PATIENT HEALTH QUESTIONNAIRE - PHQ9
SUM OF ALL RESPONSES TO PHQ QUESTIONS 1-9: 0
SUM OF ALL RESPONSES TO PHQ9 QUESTIONS 1 & 2: 0
1. LITTLE INTEREST OR PLEASURE IN DOING THINGS: 0
2. FEELING DOWN, DEPRESSED OR HOPELESS: 0

## 2018-04-17 ASSESSMENT — ENCOUNTER SYMPTOMS
WHEEZING: 0
ABDOMINAL PAIN: 0
CHEST TIGHTNESS: 0
PHOTOPHOBIA: 0
SHORTNESS OF BREATH: 0
STRIDOR: 0
COUGH: 0
EYE PAIN: 0
ABDOMINAL DISTENTION: 0

## 2018-04-18 ENCOUNTER — TELEPHONE (OUTPATIENT)
Dept: FAMILY MEDICINE CLINIC | Age: 48
End: 2018-04-18

## 2018-04-18 ENCOUNTER — HOSPITAL ENCOUNTER (OUTPATIENT)
Dept: WOUND CARE | Age: 48
Discharge: HOME | End: 2018-04-18
Payer: MEDICARE

## 2018-04-18 ENCOUNTER — TELEPHONE (OUTPATIENT)
Dept: INTERNAL MEDICINE CLINIC | Age: 48
End: 2018-04-18

## 2018-04-18 VITALS
DIASTOLIC BLOOD PRESSURE: 74 MMHG | HEART RATE: 109 BPM | OXYGEN SATURATION: 100 % | TEMPERATURE: 98.2 F | RESPIRATION RATE: 18 BRPM | SYSTOLIC BLOOD PRESSURE: 171 MMHG

## 2018-04-18 PROCEDURE — 99213 OFFICE O/P EST LOW 20 MIN: CPT

## 2018-04-18 ASSESSMENT — PAIN DESCRIPTION - ORIENTATION: ORIENTATION: RIGHT

## 2018-04-18 ASSESSMENT — PAIN DESCRIPTION - ONSET: ONSET: ON-GOING

## 2018-04-18 ASSESSMENT — PAIN DESCRIPTION - PROGRESSION: CLINICAL_PROGRESSION: NOT CHANGED

## 2018-04-18 ASSESSMENT — PAIN DESCRIPTION - PAIN TYPE: TYPE: CHRONIC PAIN

## 2018-04-18 ASSESSMENT — PAIN DESCRIPTION - FREQUENCY: FREQUENCY: CONTINUOUS

## 2018-04-18 ASSESSMENT — PAIN DESCRIPTION - LOCATION: LOCATION: LEG

## 2018-04-18 ASSESSMENT — PAIN SCALES - GENERAL: PAINLEVEL_OUTOF10: 9

## 2018-04-18 ASSESSMENT — PAIN DESCRIPTION - DESCRIPTORS: DESCRIPTORS: ACHING

## 2018-04-20 ENCOUNTER — TELEPHONE (OUTPATIENT)
Dept: INTERNAL MEDICINE CLINIC | Age: 48
End: 2018-04-20

## 2018-04-23 ENCOUNTER — TELEPHONE (OUTPATIENT)
Dept: INTERNAL MEDICINE CLINIC | Age: 48
End: 2018-04-23

## 2018-04-23 ENCOUNTER — OFFICE VISIT (OUTPATIENT)
Dept: INTERNAL MEDICINE CLINIC | Age: 48
End: 2018-04-23
Payer: MEDICAID

## 2018-04-23 VITALS
BODY MASS INDEX: 36.08 KG/M2 | HEIGHT: 66 IN | RESPIRATION RATE: 16 BRPM | HEART RATE: 99 BPM | WEIGHT: 224.5 LBS | DIASTOLIC BLOOD PRESSURE: 60 MMHG | SYSTOLIC BLOOD PRESSURE: 128 MMHG

## 2018-04-23 DIAGNOSIS — E11.65 POORLY CONTROLLED TYPE 2 DIABETES MELLITUS (HCC): Primary | ICD-10-CM

## 2018-04-23 DIAGNOSIS — F31.81 BIPOLAR 2 DISORDER (HCC): ICD-10-CM

## 2018-04-23 PROCEDURE — 1036F TOBACCO NON-USER: CPT | Performed by: NURSE PRACTITIONER

## 2018-04-23 PROCEDURE — 2022F DILAT RTA XM EVC RTNOPTHY: CPT | Performed by: NURSE PRACTITIONER

## 2018-04-23 PROCEDURE — G8417 CALC BMI ABV UP PARAM F/U: HCPCS | Performed by: NURSE PRACTITIONER

## 2018-04-23 PROCEDURE — 3017F COLORECTAL CA SCREEN DOC REV: CPT | Performed by: NURSE PRACTITIONER

## 2018-04-23 PROCEDURE — G8427 DOCREV CUR MEDS BY ELIG CLIN: HCPCS | Performed by: NURSE PRACTITIONER

## 2018-04-23 PROCEDURE — 1111F DSCHRG MED/CURRENT MED MERGE: CPT | Performed by: NURSE PRACTITIONER

## 2018-04-23 PROCEDURE — 3046F HEMOGLOBIN A1C LEVEL >9.0%: CPT | Performed by: NURSE PRACTITIONER

## 2018-04-23 PROCEDURE — 99214 OFFICE O/P EST MOD 30 MIN: CPT | Performed by: NURSE PRACTITIONER

## 2018-04-23 RX ORDER — MIRTAZAPINE 15 MG/1
15 TABLET, FILM COATED ORAL NIGHTLY
Qty: 30 TABLET | Refills: 0 | Status: SHIPPED | OUTPATIENT
Start: 2018-04-23 | End: 2019-04-01 | Stop reason: ALTCHOICE

## 2018-04-23 RX ORDER — SERTRALINE HYDROCHLORIDE 100 MG/1
150 TABLET, FILM COATED ORAL DAILY
Qty: 45 TABLET | Refills: 0 | Status: ON HOLD | OUTPATIENT
Start: 2018-04-23 | End: 2019-04-30 | Stop reason: SDUPTHER

## 2018-04-23 RX ORDER — QUETIAPINE FUMARATE 100 MG/1
100 TABLET, FILM COATED ORAL NIGHTLY
Qty: 30 TABLET | Refills: 0 | Status: SHIPPED | OUTPATIENT
Start: 2018-04-23 | End: 2018-08-23

## 2018-04-23 ASSESSMENT — ENCOUNTER SYMPTOMS
DIARRHEA: 1
SHORTNESS OF BREATH: 0
CHEST TIGHTNESS: 0
WHEEZING: 0
ABDOMINAL PAIN: 0
EYE PAIN: 0
PHOTOPHOBIA: 0
BLURRED VISION: 0
STRIDOR: 0
COUGH: 0
ABDOMINAL DISTENTION: 0

## 2018-04-24 ENCOUNTER — TELEPHONE (OUTPATIENT)
Dept: INTERNAL MEDICINE CLINIC | Age: 48
End: 2018-04-24

## 2018-04-24 DIAGNOSIS — E11.65 POORLY CONTROLLED TYPE 2 DIABETES MELLITUS (HCC): Primary | ICD-10-CM

## 2018-04-24 PROBLEM — A41.9 SEPSIS DUE TO CELLULITIS (HCC): Status: RESOLVED | Noted: 2018-03-24 | Resolved: 2018-04-24

## 2018-04-24 PROBLEM — L03.90 SEPSIS DUE TO CELLULITIS (HCC): Status: RESOLVED | Noted: 2018-03-24 | Resolved: 2018-04-24

## 2018-04-24 PROBLEM — A41.9 SEPSIS DUE TO CELLULITIS (HCC): Status: RESOLVED | Noted: 2018-04-03 | Resolved: 2018-04-24

## 2018-04-24 PROBLEM — L03.90 SEPSIS DUE TO CELLULITIS (HCC): Status: RESOLVED | Noted: 2018-04-03 | Resolved: 2018-04-24

## 2018-04-24 RX ORDER — PANTOPRAZOLE SODIUM 40 MG/1
40 TABLET, DELAYED RELEASE ORAL DAILY
Qty: 90 TABLET | Refills: 0 | Status: SHIPPED | OUTPATIENT
Start: 2018-04-24 | End: 2018-08-23

## 2018-04-26 ENCOUNTER — TELEPHONE (OUTPATIENT)
Dept: INTERNAL MEDICINE CLINIC | Age: 48
End: 2018-04-26

## 2018-05-15 ENCOUNTER — TELEPHONE (OUTPATIENT)
Dept: INTERNAL MEDICINE CLINIC | Age: 48
End: 2018-05-15

## 2018-05-25 ENCOUNTER — TELEPHONE (OUTPATIENT)
Dept: WOUND CARE | Age: 48
End: 2018-05-25

## 2018-08-23 ENCOUNTER — HOSPITAL ENCOUNTER (EMERGENCY)
Age: 48
Discharge: HOME | End: 2018-08-23
Payer: MEDICARE

## 2018-08-23 VITALS
DIASTOLIC BLOOD PRESSURE: 47 MMHG | TEMPERATURE: 98.3 F | HEART RATE: 97 BPM | WEIGHT: 224 LBS | SYSTOLIC BLOOD PRESSURE: 110 MMHG | BODY MASS INDEX: 36 KG/M2 | HEIGHT: 66 IN | OXYGEN SATURATION: 97 % | RESPIRATION RATE: 18 BRPM

## 2018-08-23 DIAGNOSIS — R73.9 HYPERGLYCEMIA: Primary | ICD-10-CM

## 2018-08-23 DIAGNOSIS — E11.65 POORLY CONTROLLED TYPE 2 DIABETES MELLITUS (HCC): ICD-10-CM

## 2018-08-23 DIAGNOSIS — E11.65 POORLY CONTROLLED DIABETES MELLITUS (HCC): ICD-10-CM

## 2018-08-23 DIAGNOSIS — Z91.14 HISTORY OF MEDICATION NONCOMPLIANCE: ICD-10-CM

## 2018-08-23 DIAGNOSIS — R74.8 ELEVATED LIPASE: ICD-10-CM

## 2018-08-23 LAB
ALBUMIN SERPL-MCNC: 4 G/DL (ref 3.5–5.1)
ALP BLD-CCNC: 66 U/L (ref 38–126)
ALT SERPL-CCNC: 21 U/L (ref 11–66)
AMPHETAMINE+METHAMPHETAMINE URINE SCREEN: NEGATIVE
ANION GAP SERPL CALCULATED.3IONS-SCNC: 18 MEQ/L (ref 8–16)
AST SERPL-CCNC: 23 U/L (ref 5–40)
AVERAGE GLUCOSE: 306 MG/DL (ref 70–126)
BACTERIA: ABNORMAL /HPF
BARBITURATE QUANTITATIVE URINE: NEGATIVE
BASE EXCESS MIXED: 0.2 MMOL/L (ref -2–3)
BASOPHILS # BLD: 0.4 %
BASOPHILS ABSOLUTE: 0 THOU/MM3 (ref 0–0.1)
BENZODIAZEPINE QUANTITATIVE URINE: NEGATIVE
BETA-HYDROXYBUTYRATE: 1.43 MG/DL (ref 0.2–2.81)
BILIRUB SERPL-MCNC: 0.3 MG/DL (ref 0.3–1.2)
BILIRUBIN DIRECT: < 0.2 MG/DL (ref 0–0.3)
BILIRUBIN URINE: NEGATIVE
BLOOD, URINE: NEGATIVE
BUN BLDV-MCNC: 18 MG/DL (ref 7–22)
CALCIUM SERPL-MCNC: 9.4 MG/DL (ref 8.5–10.5)
CANNABINOID QUANTITATIVE URINE: NEGATIVE
CASTS UA: ABNORMAL /LPF
CHARACTER, URINE: CLEAR
CHLORIDE BLD-SCNC: 93 MEQ/L (ref 98–111)
CO2: 21 MEQ/L (ref 23–33)
COCAINE METABOLITE QUANTITATIVE URINE: NEGATIVE
COLLECTED BY:: ABNORMAL
COLOR: YELLOW
CREAT SERPL-MCNC: 0.9 MG/DL (ref 0.4–1.2)
CRYSTALS, UA: ABNORMAL
DEVICE: ABNORMAL
EOSINOPHIL # BLD: 3 %
EOSINOPHILS ABSOLUTE: 0.3 THOU/MM3 (ref 0–0.4)
EPITHELIAL CELLS, UA: ABNORMAL /HPF
ERYTHROCYTE [DISTWIDTH] IN BLOOD BY AUTOMATED COUNT: 14.2 % (ref 11.5–14.5)
ERYTHROCYTE [DISTWIDTH] IN BLOOD BY AUTOMATED COUNT: 45.2 FL (ref 35–45)
ETHYL ALCOHOL, SERUM: < 0.01 %
GFR SERPL CREATININE-BSD FRML MDRD: 89 ML/MIN/1.73M2
GLUCOSE BLD-MCNC: 358 MG/DL (ref 70–108)
GLUCOSE BLD-MCNC: 379 MG/DL (ref 70–108)
GLUCOSE BLD-MCNC: 523 MG/DL (ref 70–108)
GLUCOSE BLD-MCNC: 531 MG/DL (ref 70–108)
GLUCOSE URINE: >= 1000 MG/DL
HBA1C MFR BLD: 12.2 % (ref 4.4–6.4)
HCO3, MIXED: 25 MMOL/L (ref 23–28)
HCT VFR BLD CALC: 42.1 % (ref 42–52)
HEMOGLOBIN: 14.8 GM/DL (ref 14–18)
IMMATURE GRANS (ABS): 0.09 THOU/MM3 (ref 0–0.07)
IMMATURE GRANULOCYTES: 0.8 %
KETONES, URINE: NEGATIVE
LEUKOCYTE ESTERASE, URINE: NEGATIVE
LIPASE: 265.4 U/L (ref 5.6–51.3)
LYMPHOCYTES # BLD: 22.5 %
LYMPHOCYTES ABSOLUTE: 2.6 THOU/MM3 (ref 1–4.8)
MAGNESIUM: 2 MG/DL (ref 1.6–2.4)
MCH RBC QN AUTO: 30.5 PG (ref 26–33)
MCHC RBC AUTO-ENTMCNC: 35.2 GM/DL (ref 32.2–35.5)
MCV RBC AUTO: 86.8 FL (ref 80–94)
MONOCYTES # BLD: 8.2 %
MONOCYTES ABSOLUTE: 1 THOU/MM3 (ref 0.4–1.3)
NITRITE, URINE: NEGATIVE
NUCLEATED RED BLOOD CELLS: 0 /100 WBC
O2 SAT, MIXED: 78 %
OPIATES, URINE: NEGATIVE
OSMOLALITY CALCULATION: 290 MOSMOL/KG (ref 275–300)
OXYCODONE: NEGATIVE
PCO2, MIXED VENOUS: 40 MMHG (ref 41–51)
PH UA: 5
PH, MIXED: 7.4 (ref 7.31–7.41)
PHENCYCLIDINE QUANTITATIVE URINE: NEGATIVE
PLATELET # BLD: 254 THOU/MM3 (ref 130–400)
PMV BLD AUTO: 10 FL (ref 9.4–12.4)
PO2 MIXED: 42 MMHG (ref 25–40)
POTASSIUM SERPL-SCNC: 4.4 MEQ/L (ref 3.5–5.2)
PROTEIN UA: NEGATIVE
RBC # BLD: 4.85 MILL/MM3 (ref 4.7–6.1)
RBC URINE: ABNORMAL /HPF
SEG NEUTROPHILS: 65.1 %
SEGMENTED NEUTROPHILS ABSOLUTE COUNT: 7.6 THOU/MM3 (ref 1.8–7.7)
SODIUM BLD-SCNC: 132 MEQ/L (ref 135–145)
SPECIFIC GRAVITY, URINE: > 1.03 (ref 1–1.03)
TOTAL PROTEIN: 8 G/DL (ref 6.1–8)
UROBILINOGEN, URINE: 0.2 EU/DL
WBC # BLD: 11.6 THOU/MM3 (ref 4.8–10.8)
WBC UA: ABNORMAL /HPF
YEAST: ABNORMAL

## 2018-08-23 PROCEDURE — 81001 URINALYSIS AUTO W/SCOPE: CPT

## 2018-08-23 PROCEDURE — 85025 COMPLETE CBC W/AUTO DIFF WBC: CPT

## 2018-08-23 PROCEDURE — 36415 COLL VENOUS BLD VENIPUNCTURE: CPT

## 2018-08-23 PROCEDURE — 83735 ASSAY OF MAGNESIUM: CPT

## 2018-08-23 PROCEDURE — 83036 HEMOGLOBIN GLYCOSYLATED A1C: CPT

## 2018-08-23 PROCEDURE — 82010 KETONE BODYS QUAN: CPT

## 2018-08-23 PROCEDURE — 6370000000 HC RX 637 (ALT 250 FOR IP): Performed by: PHYSICIAN ASSISTANT

## 2018-08-23 PROCEDURE — 82248 BILIRUBIN DIRECT: CPT

## 2018-08-23 PROCEDURE — 96374 THER/PROPH/DIAG INJ IV PUSH: CPT

## 2018-08-23 PROCEDURE — 83690 ASSAY OF LIPASE: CPT

## 2018-08-23 PROCEDURE — 99283 EMERGENCY DEPT VISIT LOW MDM: CPT

## 2018-08-23 PROCEDURE — 2580000003 HC RX 258: Performed by: PHYSICIAN ASSISTANT

## 2018-08-23 PROCEDURE — G0480 DRUG TEST DEF 1-7 CLASSES: HCPCS

## 2018-08-23 PROCEDURE — 82948 REAGENT STRIP/BLOOD GLUCOSE: CPT

## 2018-08-23 PROCEDURE — 80053 COMPREHEN METABOLIC PANEL: CPT

## 2018-08-23 PROCEDURE — 80307 DRUG TEST PRSMV CHEM ANLYZR: CPT

## 2018-08-23 RX ORDER — BLOOD-GLUCOSE METER
1 KIT MISCELLANEOUS
Qty: 1 KIT | Refills: 0 | Status: SHIPPED | OUTPATIENT
Start: 2018-08-23 | End: 2018-10-11 | Stop reason: SDUPTHER

## 2018-08-23 RX ORDER — LANCETS 30 GAUGE
EACH MISCELLANEOUS
Qty: 100 EACH | Refills: 3 | Status: ON HOLD | OUTPATIENT
Start: 2018-08-23 | End: 2019-04-30 | Stop reason: SDUPTHER

## 2018-08-23 RX ORDER — 0.9 % SODIUM CHLORIDE 0.9 %
1000 INTRAVENOUS SOLUTION INTRAVENOUS ONCE
Status: COMPLETED | OUTPATIENT
Start: 2018-08-23 | End: 2018-08-23

## 2018-08-23 RX ORDER — GLUCOSAMINE HCL/CHONDROITIN SU 500-400 MG
CAPSULE ORAL
Qty: 400 STRIP | Refills: 1 | Status: SHIPPED | OUTPATIENT
Start: 2018-08-23 | End: 2018-10-23 | Stop reason: SDUPTHER

## 2018-08-23 RX ADMIN — SODIUM CHLORIDE 1000 ML: 9 INJECTION, SOLUTION INTRAVENOUS at 17:23

## 2018-08-23 RX ADMIN — Medication 10 UNITS: at 17:28

## 2018-08-23 ASSESSMENT — ENCOUNTER SYMPTOMS
VOMITING: 0
CONSTIPATION: 0
EYE DISCHARGE: 0
COUGH: 0
DIARRHEA: 0
NAUSEA: 0
BACK PAIN: 0
WHEEZING: 0
EYE REDNESS: 0
SHORTNESS OF BREATH: 0
COLOR CHANGE: 0
ABDOMINAL PAIN: 0
SORE THROAT: 0
RHINORRHEA: 0

## 2018-08-23 NOTE — ED PROVIDER NOTES
[facility] EMERGENCY DEPT      CHIEF COMPLAINT       Chief Complaint   Patient presents with    Blood Sugar Problem       Nurses Notes reviewed and I agree except as noted in the HPI. HISTORY OF PRESENT ILLNESS    Ashwin Vickers is a 46 y.o. male who presents to the emergency department for evaluation of his blood sugar. The patient reports a history of diabetes for 25 years. He states that he has not checked his blood sugar for the past 3 months. He states that he has not been using his insulin medications for the past 2 months because he states that he is out of his medications and he does not have a glucose meter. The patient states that for the past couple of days, he has been very thirsty, has had a dry mouth, has been urinating more frequently, has been sweaty and fatigued, and has had a decreased appetite. He denies any chest pain, shortness of breath, abdominal pain, nausea, vomiting, diarrhea, constipation, fevers, or headaches. 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 He has no additional complaints at this time. REVIEW OF SYSTEMS     Review of Systems   Constitutional: Positive for appetite change, diaphoresis and fatigue (decreased). Negative for chills and fever. HENT: Negative for congestion, ear pain, rhinorrhea and sore throat. Eyes: Negative for discharge and redness. Respiratory: Negative for cough, shortness of breath and wheezing. Cardiovascular: Negative for chest pain and palpitations. Gastrointestinal: Negative for abdominal pain, constipation, diarrhea, nausea and vomiting. Endocrine: Positive for polydipsia and polyuria. Negative for polyphagia. Genitourinary: Positive for frequency. Negative for decreased urine volume, difficulty urinating and dysuria. Musculoskeletal: Negative for arthralgias, back pain, myalgias, neck pain and neck stiffness.    Skin: Negative for HISTORY     indicated that his mother is . He reported the following about his father: unknown. He indicated that the status of his maternal grandmother is unknown. He indicated that the status of his maternal grandfather is unknown.    family history includes Arthritis in his maternal grandfather; Depression in his mother; Diabetes in his mother; Early Death in his mother; Heart Disease in his maternal grandfather; High Blood Pressure in his mother; High Cholesterol in his mother; Other in his mother; Vision Loss in his maternal grandmother. SOCIAL HISTORY      reports that he quit smoking about 4 months ago. He has a 6.50 pack-year smoking history. He has never used smokeless tobacco. He reports that he does not drink alcohol or use drugs. PHYSICAL EXAM     INITIAL VITALS:  height is 5' 6\" (1.676 m) and weight is 224 lb (101.6 kg). His oral temperature is 98.3 °F (36.8 °C). His blood pressure is 110/47 (abnormal) and his pulse is 97. His respiration is 18 and oxygen saturation is 97%. Physical Exam   Constitutional: He is oriented to person, place, and time. He appears well-developed and well-nourished. No distress. HENT:   Head: Normocephalic and atraumatic. Right Ear: External ear normal.   Left Ear: External ear normal.   Nose: Nose normal.   Mouth/Throat: Oropharynx is clear and moist.   Eyes: Pupils are equal, round, and reactive to light. Conjunctivae and EOM are normal. Right eye exhibits no discharge. Left eye exhibits no discharge. No scleral icterus. Neck: Normal range of motion. Neck supple. Cardiovascular: Normal rate, regular rhythm and normal heart sounds. Exam reveals no gallop and no friction rub. No murmur heard. Pulmonary/Chest: Effort normal and breath sounds normal. No respiratory distress. He has no wheezes. He has no rales. He exhibits no tenderness. Abdominal: Soft. Bowel sounds are normal. He exhibits no distension and no mass. There is no tenderness.  There is no rebound and no guarding. Musculoskeletal: Normal range of motion. He exhibits no edema. Lymphadenopathy:     He has no cervical adenopathy. Neurological: He is alert and oriented to person, place, and time. He exhibits normal muscle tone. Coordination normal. GCS eye subscore is 4. GCS verbal subscore is 5. GCS motor subscore is 6. Skin: Skin is warm and dry. No rash noted. He is not diaphoretic. No erythema. No pallor. Psychiatric: He has a normal mood and affect. His behavior is normal. Thought content normal.   Nursing note and vitals reviewed. DIFFERENTIAL DIAGNOSIS:   Includes but not limited to: Medication noncompliance, poorly controlled diabetes, DKA    DIAGNOSTIC RESULTS     EKG: All EKG's are interpreted by the Emergency Department Physician who either signs or Co-signs this chart in the absence of a cardiologist.  None    RADIOLOGY: non-plain film images(s) such as CT, Ultrasound and MRI are read by the radiologist.  Plain radiographic images are visualized and preliminarily interpreted by the emergency physician unless otherwise stated below.   No orders to display       LABS:   Labs Reviewed   CBC WITH AUTO DIFFERENTIAL - Abnormal; Notable for the following:        Result Value    WBC 11.6 (*)     RDW-SD 45.2 (*)     Immature Grans (Abs) 0.09 (*)     All other components within normal limits   BASIC METABOLIC PANEL - Abnormal; Notable for the following:     Sodium 132 (*)     Chloride 93 (*)     CO2 21 (*)     Glucose 523 (*)     All other components within normal limits   LIPASE - Abnormal; Notable for the following:     Lipase 265.4 (*)     All other components within normal limits   BLOOD GAS, VENOUS - Abnormal; Notable for the following:     PCO2, MIXED VENOUS 40 (*)     PO2, Mixed 42 (*)     All other components within normal limits   HEMOGLOBIN A1C - Abnormal; Notable for the following:     Hemoglobin A1C 12.2 (*)     AVERAGE GLUCOSE 306 (*)     All other components within normal limits   ANION GAP - Abnormal; Notable for the following: Anion Gap 18.0 (*)     All other components within normal limits   GLOMERULAR FILTRATION RATE, ESTIMATED - Abnormal; Notable for the following:     Est, Glom Filt Rate 89 (*)     All other components within normal limits   URINE WITH REFLEXED MICRO - Abnormal; Notable for the following:     Glucose, Ur >= 1000 (*)     Specific Gravity, Urine > 1.030 (*)     All other components within normal limits   POCT GLUCOSE - Abnormal; Notable for the following:     POC Glucose 531 (*)     All other components within normal limits   POCT GLUCOSE - Abnormal; Notable for the following:     POC Glucose 379 (*)     All other components within normal limits   POCT GLUCOSE - Abnormal; Notable for the following:     POC Glucose 358 (*)     All other components within normal limits   HEPATIC FUNCTION PANEL   MAGNESIUM   URINE DRUG SCREEN   ETHANOL   BETA-HYDROXYBUTYRATE   OSMOLALITY       EMERGENCY DEPARTMENT COURSE:   Vitals:    Vitals:    08/23/18 1708 08/23/18 1816 08/23/18 1851   BP: 138/83 (!) 112/54 (!) 110/47   Pulse: 107 101 97   Resp: 20 18 18   Temp: 98.3 °F (36.8 °C)     TempSrc: Oral     SpO2: 98% 98% 97%   Weight: 224 lb (101.6 kg)     Height: 5' 6\" (1.676 m)       The patient was seen and evaluated within the ED today with hyperglycemia. Within the department, I observed the patient's vital signs to be within acceptable range, and he was afebrile. On exam, I appreciated clear lung sounds. There was no chest wall or abdominal tenderness. Laboratory work revealed a lipase of 265.4, but he has no abdominal pain or tenderness, nausea, or vomiting, and he tolerates PO fluids well. BG on arrival was 531, followed by repeat readings of 379 and 358 following insulin administration. Beta-hydroxybutyrate is 1.43. Hgb A1C is 12.2. Within the department, the patient was treated with IV saline and 10 units of insulin.   I observed the patient's condition to improve during the duration of the stay. I explained my proposed course of treatment to the patient, who was amenable to my treatment and discharge decisions. He was discharged home in stable condition with prescriptions for Basalar Kwikpen, Humalog, and BG meter, lancets, and test strips, and the patient will return to the ED if the symptoms become more severe in nature or otherwise change. He was instructed to check his BG and take his medications regularly as prescribed. CRITICAL CARE:   None    CONSULTS:  Discussed the case with my attending physician in the Emergency Department, who agreed with my workup, treatment, and disposition decisions. PROCEDURES:  None    FINAL IMPRESSION      1. Hyperglycemia    2. Poorly controlled diabetes mellitus ([address]    3. History of medication noncompliance    4. Uncontrolled type 2 diabetes mellitus with insulin therapy ([address]    5. Poorly controlled type 2 diabetes mellitus (HCC)    6. Elevated lipase          DISPOSITION/PLAN     1. Hyperglycemia    2. Poorly controlled diabetes mellitus ([address]    3. History of medication noncompliance    4. Uncontrolled type 2 diabetes mellitus with insulin therapy ([address]    5. Poorly controlled type 2 diabetes mellitus (HCC)    6. Elevated lipase       I have given the patient strict written and verbal instructions about care at home, follow-up, and signs and symptoms of worsening of condition, and the patient did verbalize understanding of these instructions. Patient was discharged in stable condition. Will return if symptoms change or worsen, or for any sign or symptom deemed emergent by the patient or family members. Follow up as an outpatient or sooner if symptoms warrant.      PATIENT REFERRED TO:  [address] [place] [address]  [phone]  Call in 3 days  As needed, If symptoms worsen, For re-check      DISCHARGE MEDICATIONS:  Current Discharge Medication List          (Please note that portions of this note were completed with a voice recognition program.  Efforts were made to edit the dictations but occasionally words are mis-transcribed.)    CATHI Marie PA-C  08/23/18 [address] Ramirez PA-C  08/23/18 7610

## 2018-09-17 ENCOUNTER — APPOINTMENT (OUTPATIENT)
Dept: GENERAL RADIOLOGY | Age: 48
End: 2018-09-17
Payer: MEDICARE

## 2018-09-17 ENCOUNTER — HOSPITAL ENCOUNTER (EMERGENCY)
Age: 48
Discharge: HOME | End: 2018-09-17
Payer: MEDICARE

## 2018-09-17 ENCOUNTER — APPOINTMENT (OUTPATIENT)
Dept: CT IMAGING | Age: 48
End: 2018-09-17
Payer: MEDICARE

## 2018-09-17 VITALS
OXYGEN SATURATION: 100 % | BODY MASS INDEX: 35.84 KG/M2 | HEART RATE: 118 BPM | HEIGHT: 66 IN | DIASTOLIC BLOOD PRESSURE: 80 MMHG | SYSTOLIC BLOOD PRESSURE: 135 MMHG | WEIGHT: 223 LBS | TEMPERATURE: 98.5 F | RESPIRATION RATE: 18 BRPM

## 2018-09-17 DIAGNOSIS — Z86.2 HISTORY OF LEUKOCYTOSIS: ICD-10-CM

## 2018-09-17 DIAGNOSIS — Z79.4 UNCONTROLLED TYPE 2 DIABETES MELLITUS WITH HYPERGLYCEMIA, WITH LONG-TERM CURRENT USE OF INSULIN (HCC): Primary | ICD-10-CM

## 2018-09-17 DIAGNOSIS — E11.65 TYPE 2 DIABETES MELLITUS WITH HYPERGLYCEMIA, UNSPECIFIED WHETHER LONG TERM INSULIN USE (HCC): ICD-10-CM

## 2018-09-17 DIAGNOSIS — E11.65 UNCONTROLLED TYPE 2 DIABETES MELLITUS WITH HYPERGLYCEMIA, WITH LONG-TERM CURRENT USE OF INSULIN (HCC): Primary | ICD-10-CM

## 2018-09-17 DIAGNOSIS — E11.65 POORLY CONTROLLED DIABETES MELLITUS (HCC): ICD-10-CM

## 2018-09-17 LAB
ALBUMIN SERPL-MCNC: 3.7 G/DL (ref 3.5–5.1)
ALP BLD-CCNC: 87 U/L (ref 38–126)
ALT SERPL-CCNC: 7 U/L (ref 11–66)
ANION GAP SERPL CALCULATED.3IONS-SCNC: 21 MEQ/L (ref 8–16)
AST SERPL-CCNC: 9 U/L (ref 5–40)
BACTERIA: ABNORMAL /HPF
BASE EXCESS MIXED: -1.2 MMOL/L (ref -2–3)
BASOPHILS # BLD: 0.2 %
BASOPHILS ABSOLUTE: 0 THOU/MM3 (ref 0–0.1)
BETA-HYDROXYBUTYRATE: 1.9 MG/DL (ref 0.2–2.81)
BILIRUB SERPL-MCNC: 0.7 MG/DL (ref 0.3–1.2)
BILIRUBIN URINE: NEGATIVE
BLOOD, URINE: ABNORMAL
BUN BLDV-MCNC: 18 MG/DL (ref 7–22)
CALCIUM SERPL-MCNC: 9 MG/DL (ref 8.5–10.5)
CASTS 2: ABNORMAL /LPF
CASTS UA: ABNORMAL /LPF
CHARACTER, URINE: CLEAR
CHLORIDE BLD-SCNC: 88 MEQ/L (ref 98–111)
CO2: 18 MEQ/L (ref 23–33)
COLLECTED BY:: ABNORMAL
COLOR: YELLOW
CREAT SERPL-MCNC: 0.9 MG/DL (ref 0.4–1.2)
CRYSTALS, UA: ABNORMAL
D-DIMER QUANTITATIVE: 1227 NG/ML FEU (ref 0–500)
EKG ATRIAL RATE: 128 BPM
EKG P AXIS: 38 DEGREES
EKG P-R INTERVAL: 150 MS
EKG Q-T INTERVAL: 308 MS
EKG QRS DURATION: 74 MS
EKG QTC CALCULATION (BAZETT): 449 MS
EKG R AXIS: 21 DEGREES
EKG T AXIS: 58 DEGREES
EKG VENTRICULAR RATE: 128 BPM
EOSINOPHIL # BLD: 0.4 %
EOSINOPHILS ABSOLUTE: 0.1 THOU/MM3 (ref 0–0.4)
EPITHELIAL CELLS, UA: ABNORMAL /HPF
ERYTHROCYTE [DISTWIDTH] IN BLOOD BY AUTOMATED COUNT: 13.7 % (ref 11.5–14.5)
ERYTHROCYTE [DISTWIDTH] IN BLOOD BY AUTOMATED COUNT: 41.7 FL (ref 35–45)
ETHYL ALCOHOL, SERUM: < 0.01 %
GFR SERPL CREATININE-BSD FRML MDRD: 89 ML/MIN/1.73M2
GLUCOSE BLD-MCNC: 357 MG/DL (ref 70–108)
GLUCOSE BLD-MCNC: 404 MG/DL (ref 70–108)
GLUCOSE BLD-MCNC: 465 MG/DL (ref 70–108)
GLUCOSE BLD-MCNC: 653 MG/DL (ref 70–108)
GLUCOSE BLD-MCNC: > 600 MG/DL (ref 70–108)
GLUCOSE URINE: >= 1000 MG/DL
HCO3, MIXED: 23 MMOL/L (ref 23–28)
HCT VFR BLD CALC: 40.6 % (ref 42–52)
HEMOGLOBIN: 14.6 GM/DL (ref 14–18)
IMMATURE GRANS (ABS): 0.16 THOU/MM3 (ref 0–0.07)
IMMATURE GRANULOCYTES: 0.9 %
KETONES, URINE: 15
LEUKOCYTE ESTERASE, URINE: ABNORMAL
LYMPHOCYTES # BLD: 6.3 %
LYMPHOCYTES ABSOLUTE: 1.1 THOU/MM3 (ref 1–4.8)
MCH RBC QN AUTO: 30.4 PG (ref 26–33)
MCHC RBC AUTO-ENTMCNC: 36 GM/DL (ref 32.2–35.5)
MCV RBC AUTO: 84.4 FL (ref 80–94)
MISCELLANEOUS 2: ABNORMAL
MONOCYTES # BLD: 6.7 %
MONOCYTES ABSOLUTE: 1.2 THOU/MM3 (ref 0.4–1.3)
NITRITE, URINE: NEGATIVE
NUCLEATED RED BLOOD CELLS: 0 /100 WBC
O2 SAT, MIXED: 70 %
OSMOLALITY CALCULATION: 287.9 MOSMOL/KG (ref 275–300)
PCO2, MIXED VENOUS: 38 MMHG (ref 41–51)
PH UA: 5
PH, MIXED: 7.4 (ref 7.31–7.41)
PLATELET # BLD: 213 THOU/MM3 (ref 130–400)
PMV BLD AUTO: 10.1 FL (ref 9.4–12.4)
PO2 MIXED: 36 MMHG (ref 25–40)
POTASSIUM SERPL-SCNC: 4.3 MEQ/L (ref 3.5–5.2)
PROCALCITONIN: 0.39 NG/ML (ref 0.01–0.09)
PROTEIN UA: NEGATIVE
RBC # BLD: 4.81 MILL/MM3 (ref 4.7–6.1)
RBC URINE: ABNORMAL /HPF
RENAL EPITHELIAL, UA: ABNORMAL
SEG NEUTROPHILS: 85.5 %
SEGMENTED NEUTROPHILS ABSOLUTE COUNT: 15.3 THOU/MM3 (ref 1.8–7.7)
SODIUM BLD-SCNC: 127 MEQ/L (ref 135–145)
SPECIFIC GRAVITY, URINE: > 1.03 (ref 1–1.03)
TOTAL PROTEIN: 8.1 G/DL (ref 6.1–8)
TROPONIN T: < 0.01 NG/ML
UROBILINOGEN, URINE: 0.2 EU/DL
WBC # BLD: 17.9 THOU/MM3 (ref 4.8–10.8)
WBC UA: ABNORMAL /HPF
YEAST: ABNORMAL

## 2018-09-17 PROCEDURE — 99285 EMERGENCY DEPT VISIT HI MDM: CPT

## 2018-09-17 PROCEDURE — 36415 COLL VENOUS BLD VENIPUNCTURE: CPT

## 2018-09-17 PROCEDURE — 82010 KETONE BODYS QUAN: CPT

## 2018-09-17 PROCEDURE — 85379 FIBRIN DEGRADATION QUANT: CPT

## 2018-09-17 PROCEDURE — 84484 ASSAY OF TROPONIN QUANT: CPT

## 2018-09-17 PROCEDURE — 71275 CT ANGIOGRAPHY CHEST: CPT

## 2018-09-17 PROCEDURE — 2580000003 HC RX 258: Performed by: PHYSICIAN ASSISTANT

## 2018-09-17 PROCEDURE — 71046 X-RAY EXAM CHEST 2 VIEWS: CPT

## 2018-09-17 PROCEDURE — 87077 CULTURE AEROBIC IDENTIFY: CPT

## 2018-09-17 PROCEDURE — 81001 URINALYSIS AUTO W/SCOPE: CPT

## 2018-09-17 PROCEDURE — 84145 PROCALCITONIN (PCT): CPT

## 2018-09-17 PROCEDURE — 6360000004 HC RX CONTRAST MEDICATION: Performed by: PHYSICIAN ASSISTANT

## 2018-09-17 PROCEDURE — 87086 URINE CULTURE/COLONY COUNT: CPT

## 2018-09-17 PROCEDURE — 82948 REAGENT STRIP/BLOOD GLUCOSE: CPT

## 2018-09-17 PROCEDURE — 85025 COMPLETE CBC W/AUTO DIFF WBC: CPT

## 2018-09-17 PROCEDURE — 93005 ELECTROCARDIOGRAM TRACING: CPT | Performed by: PHYSICIAN ASSISTANT

## 2018-09-17 PROCEDURE — 80053 COMPREHEN METABOLIC PANEL: CPT

## 2018-09-17 PROCEDURE — 6370000000 HC RX 637 (ALT 250 FOR IP): Performed by: PHYSICIAN ASSISTANT

## 2018-09-17 PROCEDURE — 80305 DRUG TEST PRSMV DIR OPT OBS: CPT

## 2018-09-17 PROCEDURE — G0480 DRUG TEST DEF 1-7 CLASSES: HCPCS

## 2018-09-17 RX ORDER — 0.9 % SODIUM CHLORIDE 0.9 %
1000 INTRAVENOUS SOLUTION INTRAVENOUS ONCE
Status: COMPLETED | OUTPATIENT
Start: 2018-09-17 | End: 2018-09-17

## 2018-09-17 RX ADMIN — SODIUM CHLORIDE 1000 ML: 9 INJECTION, SOLUTION INTRAVENOUS at 13:34

## 2018-09-17 RX ADMIN — IOPAMIDOL 80 ML: 755 INJECTION, SOLUTION INTRAVENOUS at 13:25

## 2018-09-17 RX ADMIN — SODIUM CHLORIDE 1000 ML: 9 INJECTION, SOLUTION INTRAVENOUS at 11:42

## 2018-09-17 RX ADMIN — Medication 10 UNITS: at 12:17

## 2018-09-17 ASSESSMENT — ENCOUNTER SYMPTOMS
SHORTNESS OF BREATH: 1
COUGH: 1
EYE DISCHARGE: 0
VOMITING: 0
ABDOMINAL PAIN: 0
DIARRHEA: 0
EYE REDNESS: 0
SORE THROAT: 0
RHINORRHEA: 0
WHEEZING: 0
NAUSEA: 1
BACK PAIN: 0

## 2018-09-17 NOTE — ED PROVIDER NOTES
I observed the patient's condition to improve during the duration of the stay. I explained my proposed course of treatment to the patient, who was amenable to my treatment and discharge decisions. He was discharged home in stable condition, and the patient will return to the ED if the symptoms become more severe in nature or otherwise change. He was counseled on the importance of being compliant with his diabetic medications.      Genevieve Weir PA-C  9/17/18  [id number]       Genevieve Weir PA-C  09/17/18 7420

## 2018-09-17 NOTE — ED TRIAGE NOTES
Pt to ED with c/o hyperglycemia of 428 this morning after taking his scheduled insulin. Pt also reports on Saturday he was having chest pain/palpitations. Pt reports he does not watch what he eats very well and that he was just started on insulin a few weeks ago.  Denies pain today, c/o sob with some lightheadedness

## 2018-09-17 NOTE — ED NOTES
radha Amezcua updated on pts blood sugar.  Second bolus ordered     Malinda Cooper RN  09/17/18 2637

## 2018-09-17 NOTE — CARE COORDINATION
Brief ED Social Work Assessment  9/17/18, 12:08 PM    Current services: None  Current equipment: None  Insurance: Has  Current PCP: Requested and provided an SRPs list  Is there anything that you need that would help you be successful at home? No  Information/referrals provided: No  Basic needs met:  Yes      Any issues affording medications?: No    Patient has transportation

## 2018-09-17 NOTE — ED PROVIDER NOTES
Negative for dizziness, syncope, weakness and numbness. Hematological: Negative for adenopathy. Psychiatric/Behavioral: Negative for confusion and suicidal ideas. The patient is not nervous/anxious. PAST MEDICAL HISTORY    has a past medical history of Bipolar 2 disorder ([facility] Diabetes mellitus type 2, uncontrolled ([facility] Diabetic foot ulcer ([facility] Diabetic polyneuropathy ([facility] Diabetic ulcer of right foot associated with type 2 diabetes mellitus ([facility] Essential hypertension; GERD (gastroesophageal reflux disease); Hammer toe of left foot; Heart murmur; History of tobacco abuse; Hx of BKA, right ([facility] Macular edema, diabetic, bilateral ([facility] Marijuana abuse in remission; Onychomycosis; and WD-Skin ulcer of fourth toe of right foot with necrosis of bone ([facility] SURGICAL HISTORY      has a past surgical history that includes other surgical history (Right, 1/14/15); Abscess Drainage (Right); Toe amputation (Right, 1/16/15); Foot Debridement (Right, 07/01/2016); Leg amputation below knee (Right, 07/20/2016); Lowber tooth extraction (?when); and pr drain infect shoulder bursa (Left, 8/18/2017).     CURRENT MEDICATIONS       Previous Medications    BLOOD GLUCOSE MONITOR STRIPS    Test blood sugars 4 times daily DX:E11.65    BLOOD GLUCOSE MONITORING SUPPL LIANNE    Use to test blood sugars DX:E11.65    GLUCOSE MONITORING KIT (FREESTYLE) MONITORING KIT    1 kit by Does not apply route 4 times daily (after meals and at bedtime)    INSULIN GLARGINE (BASAGLAR KWIKPEN) 100 UNIT/ML INJECTION PEN    Inject 40 Units into the skin 2 times daily    INSULIN LISPRO (HUMALOG) 100 UNIT/ML INJECTION VIAL    Inject 20 Units into the skin 3 times daily (before meals) Patient also said he does additional units per a sliding scale if needed    INSULIN PEN NEEDLE 32G X 4 MM MISC    1 each by Does not apply route daily    INSULIN SYRINGE-NEEDLE U-100 29G X 1/2\" 0.3 ML MISC    1 each by Does not apply route 4 times daily (after film images(s) such as CT, Ultrasound and MRI are read by the radiologist.  Plain radiographic images are visualized and preliminarily interpreted by the emergency physician unless otherwise stated below. CTA Chest W WO Contrast   Final Result       No pulmonary emboli or pulmonary infiltrates. **This report has been created using voice recognition software. It may contain minor errors which are inherent in voice recognition technology. **      Final report electronically signed by Dr. Argenis Garrett on 9/17/2018 1:54 PM      XR CHEST STANDARD (2 VW)   Final Result   No acute disease. **This report has been created using voice recognition software. It may contain minor errors which are inherent in voice recognition technology. **      Final report electronically signed by Dr. Argenis Garrett on 9/17/2018 12:18 PM          LABS:   Labs Reviewed   CBC WITH AUTO DIFFERENTIAL - Abnormal; Notable for the following:        Result Value    WBC 17.9 (*)     Hematocrit 40.6 (*)     MCHC 36.0 (*)     Segs Absolute 15.3 (*)     Immature Grans (Abs) 0.16 (*)     All other components within normal limits   COMPREHENSIVE METABOLIC PANEL - Abnormal; Notable for the following:     Glucose 653 (*)     Sodium 127 (*)     Chloride 88 (*)     CO2 18 (*)     Total Protein 8.1 (*)     ALT 7 (*)     All other components within normal limits   PROCALCITONIN - Abnormal; Notable for the following:     Procalcitonin 0.39 (*)     All other components within normal limits   D-DIMER, QUANTITATIVE - Abnormal; Notable for the following:     D-Dimer, Quant 1227.00 (*)     All other components within normal limits   URINE WITH REFLEXED MICRO - Abnormal; Notable for the following:     Glucose, Ur >= 1000 (*)     Ketones, Urine 15 (*)     Specific Gravity, Urine > 1.030 (*)     Blood, Urine TRACE (*)     Leukocyte Esterase, Urine SMALL (*)     All other components within normal limits   ANION GAP - Abnormal; Notable for the following: Anion Gap 21.0 (*)     All other components within normal limits   GLOMERULAR FILTRATION RATE, ESTIMATED - Abnormal; Notable for the following:     Est, Glom Filt Rate 89 (*)     All other components within normal limits   POCT GLUCOSE - Abnormal; Notable for the following:     POC Glucose 404 (*)     All other components within normal limits   POCT GLUCOSE - Abnormal; Notable for the following:     POC Glucose >600 (*)     All other components within normal limits   POCT GLUCOSE - Abnormal; Notable for the following:     POC Glucose 465 (*)     All other components within normal limits   URINE CULTURE, REFLEXED    Narrative:     Source: urine, clean catch       Site: clean void          Current Antibiotics: not stated   TROPONIN   ETHANOL   RAPID DRUG SCREEN, URINE   OSMOLALITY   BETA-HYDROXYBUTYRATE   POCT GLUCOSE   POCT GLUCOSE   POCT GLUCOSE       EMERGENCY DEPARTMENT COURSE:   Vitals:    Vitals:    09/17/18 1116 09/17/18 1221 09/17/18 1334   BP: (!) 148/83  132/69   Pulse: 126 116 112   Resp: 18 18 18   Temp: 98.5 °F (36.9 °C)     TempSrc: Oral     SpO2: 98% 98% 98%   Weight: 223 lb (101.2 kg)     Height: 5' 6\" (1.676 m)       Patient seen initially by me for multiple complaints. Accu check was noted at 404. Workup was ordered including insulin and saline. At the end of my shift, I transferred the patient over to one of my colleagues Arti Piper PA-C for further care. I discussed with her his case and she was amenable to take over care. CRITICAL CARE:   None    CONSULTS:  None    PROCEDURES:  None    FINAL IMPRESSION      1. Uncontrolled type 2 diabetes mellitus with hyperglycemia, with long-term current use of insulin ([facility])          DISPOSITION/PLAN     1. Uncontrolled type 2 diabetes mellitus with hyperglycemia, with long-term current use of insulin ([facility])        PATIENT REFERRED TO:  No follow-up provider specified.     DISCHARGE MEDICATIONS:  New Prescriptions    No medications on file (Please note that portions of this note were completed with a voice recognition program.  Efforts were made to edit the dictations but occasionally words are mis-transcribed.)    Scribe:  Cortney Senior 9/17/18 11:43 AM Scribing for and in the presence of CATHI Cortes. Signed by: Denia Gates, 09/17/18 2:18 PM    Provider:  I personally performed the services described in the documentation, reviewed and edited the documentation which was dictated to the scribe in my presence, and it accurately records my words and actions.     CATHI Cortes 09/17/18 2:18 PM    CATHI Cortes [place]  09/17/18 7882

## 2018-09-17 NOTE — ED NOTES
[address], pa made aware of blood sugar 465, second bolus started. Pt back from CT.  No new orders at this time      Saad Longoria, BEA  09/17/18 7011

## 2018-09-18 ENCOUNTER — APPOINTMENT (OUTPATIENT)
Dept: GENERAL RADIOLOGY | Age: 48
DRG: 854 | End: 2018-09-18
Payer: MEDICARE

## 2018-09-18 ENCOUNTER — NURSE TRIAGE (OUTPATIENT)
Dept: ADMINISTRATIVE | Age: 48
End: 2018-09-18

## 2018-09-18 ENCOUNTER — HOSPITAL ENCOUNTER (INPATIENT)
Age: 48
LOS: 3 days | Discharge: NURSING, REHAB OR LONG-TERM CARE | DRG: 854 | End: 2018-09-21
Attending: EMERGENCY MEDICINE | Admitting: INTERNAL MEDICINE
Payer: MEDICARE

## 2018-09-18 DIAGNOSIS — A41.9 SEPSIS, DUE TO UNSPECIFIED ORGANISM: Primary | ICD-10-CM

## 2018-09-18 DIAGNOSIS — L03.90 WOUND CELLULITIS: ICD-10-CM

## 2018-09-18 LAB
ALBUMIN SERPL-MCNC: 3.2 G/DL (ref 3.5–5.1)
ALP BLD-CCNC: 88 U/L (ref 38–126)
ALT SERPL-CCNC: < 5 U/L (ref 11–66)
AMPHETAMINE+METHAMPHETAMINE URINE SCREEN: NEGATIVE
ANION GAP SERPL CALCULATED.3IONS-SCNC: 16 MEQ/L (ref 8–16)
AST SERPL-CCNC: 18 U/L (ref 5–40)
AVERAGE GLUCOSE: 270 MG/DL (ref 70–126)
BACTERIA: ABNORMAL
BARBITURATE QUANTITATIVE URINE: NEGATIVE
BASOPHILS # BLD: 0.4 %
BASOPHILS ABSOLUTE: 0.1 THOU/MM3 (ref 0–0.1)
BENZODIAZEPINE QUANTITATIVE URINE: NEGATIVE
BILIRUB SERPL-MCNC: 0.9 MG/DL (ref 0.3–1.2)
BILIRUBIN DIRECT: < 0.2 MG/DL (ref 0–0.3)
BILIRUBIN URINE: NEGATIVE
BLOOD, URINE: ABNORMAL
BUN BLDV-MCNC: 9 MG/DL (ref 7–22)
CALCIUM SERPL-MCNC: 8 MG/DL (ref 8.5–10.5)
CANNABINOID QUANTITATIVE URINE: NEGATIVE
CASTS: ABNORMAL /LPF
CASTS: ABNORMAL /LPF
CHARACTER, URINE: CLEAR
CHLORIDE BLD-SCNC: 93 MEQ/L (ref 98–111)
CO2: 20 MEQ/L (ref 23–33)
COCAINE METABOLITE QUANTITATIVE URINE: NEGATIVE
COLOR: YELLOW
CREAT SERPL-MCNC: 0.7 MG/DL (ref 0.4–1.2)
CRYSTALS: ABNORMAL
EKG ATRIAL RATE: 121 BPM
EKG P AXIS: 42 DEGREES
EKG P-R INTERVAL: 146 MS
EKG Q-T INTERVAL: 324 MS
EKG QRS DURATION: 80 MS
EKG QTC CALCULATION (BAZETT): 460 MS
EKG R AXIS: 24 DEGREES
EKG T AXIS: 51 DEGREES
EKG VENTRICULAR RATE: 121 BPM
EOSINOPHIL # BLD: 0.2 %
EOSINOPHILS ABSOLUTE: 0 THOU/MM3 (ref 0–0.4)
EPITHELIAL CELLS, UA: ABNORMAL /HPF
ERYTHROCYTE [DISTWIDTH] IN BLOOD BY AUTOMATED COUNT: 13.5 % (ref 11.5–14.5)
ERYTHROCYTE [DISTWIDTH] IN BLOOD BY AUTOMATED COUNT: 42 FL (ref 35–45)
GFR SERPL CREATININE-BSD FRML MDRD: > 90 ML/MIN/1.73M2
GLUCOSE BLD-MCNC: 296 MG/DL (ref 70–108)
GLUCOSE BLD-MCNC: 306 MG/DL (ref 70–108)
GLUCOSE BLD-MCNC: 347 MG/DL (ref 70–108)
GLUCOSE BLD-MCNC: 349 MG/DL (ref 70–108)
GLUCOSE, URINE: >= 1000 MG/DL
HBA1C MFR BLD: 11 % (ref 4.4–6.4)
HCT VFR BLD CALC: 37.9 % (ref 42–52)
HEMOGLOBIN: 13.3 GM/DL (ref 14–18)
IMMATURE GRANS (ABS): 0.22 THOU/MM3 (ref 0–0.07)
IMMATURE GRANULOCYTES: 1.2 %
KETONES, URINE: 15
LACTIC ACID, SEPSIS: 2.2 MMOL/L (ref 0.5–1.9)
LACTIC ACID: 1.8 MMOL/L (ref 0.5–2.2)
LEUKOCYTE ESTERASE, URINE: ABNORMAL
LIPASE: 55.4 U/L (ref 5.6–51.3)
LYMPHOCYTES # BLD: 6.8 %
LYMPHOCYTES ABSOLUTE: 1.3 THOU/MM3 (ref 1–4.8)
MCH RBC QN AUTO: 30.2 PG (ref 26–33)
MCHC RBC AUTO-ENTMCNC: 35.1 GM/DL (ref 32.2–35.5)
MCV RBC AUTO: 85.9 FL (ref 80–94)
MISCELLANEOUS LAB TEST RESULT: ABNORMAL
MONOCYTES # BLD: 9 %
MONOCYTES ABSOLUTE: 1.7 THOU/MM3 (ref 0.4–1.3)
NITRITE, URINE: NEGATIVE
NUCLEATED RED BLOOD CELLS: 0 /100 WBC
OPIATES, URINE: NEGATIVE
ORGANISM: ABNORMAL
OSMOLALITY CALCULATION: 271.5 MOSMOL/KG (ref 275–300)
OXYCODONE: NEGATIVE
PH UA: 5.5
PHENCYCLIDINE QUANTITATIVE URINE: NEGATIVE
PLATELET # BLD: 208 THOU/MM3 (ref 130–400)
PMV BLD AUTO: 10.1 FL (ref 9.4–12.4)
POTASSIUM SERPL-SCNC: 4.5 MEQ/L (ref 3.5–5.2)
PROCALCITONIN: 0.43 NG/ML (ref 0.01–0.09)
PROTEIN UA: ABNORMAL MG/DL
RBC # BLD: 4.41 MILL/MM3 (ref 4.7–6.1)
RBC URINE: ABNORMAL /HPF
RENAL EPITHELIAL, UA: ABNORMAL
SEDIMENTATION RATE, ERYTHROCYTE: 104 MM/HR (ref 0–10)
SEG NEUTROPHILS: 82.4 %
SEGMENTED NEUTROPHILS ABSOLUTE COUNT: 15.6 THOU/MM3 (ref 1.8–7.7)
SODIUM BLD-SCNC: 129 MEQ/L (ref 135–145)
SPECIFIC GRAVITY UA: > 1.03 (ref 1–1.03)
TOTAL PROTEIN: 6.6 G/DL (ref 6.1–8)
TROPONIN T: < 0.01 NG/ML
URINE CULTURE REFLEX: ABNORMAL
UROBILINOGEN, URINE: 1 EU/DL
WBC # BLD: 18.9 THOU/MM3 (ref 4.8–10.8)
WBC UA: ABNORMAL /HPF
YEAST: ABNORMAL

## 2018-09-18 PROCEDURE — 84484 ASSAY OF TROPONIN QUANT: CPT

## 2018-09-18 PROCEDURE — 83036 HEMOGLOBIN GLYCOSYLATED A1C: CPT

## 2018-09-18 PROCEDURE — 73552 X-RAY EXAM OF FEMUR 2/>: CPT

## 2018-09-18 PROCEDURE — 71045 X-RAY EXAM CHEST 1 VIEW: CPT

## 2018-09-18 PROCEDURE — 87070 CULTURE OTHR SPECIMN AEROBIC: CPT

## 2018-09-18 PROCEDURE — 84145 PROCALCITONIN (PCT): CPT

## 2018-09-18 PROCEDURE — 73590 X-RAY EXAM OF LOWER LEG: CPT

## 2018-09-18 PROCEDURE — 83690 ASSAY OF LIPASE: CPT

## 2018-09-18 PROCEDURE — 6370000000 HC RX 637 (ALT 250 FOR IP): Performed by: INTERNAL MEDICINE

## 2018-09-18 PROCEDURE — 87186 SC STD MICRODIL/AGAR DIL: CPT

## 2018-09-18 PROCEDURE — 36415 COLL VENOUS BLD VENIPUNCTURE: CPT

## 2018-09-18 PROCEDURE — 99285 EMERGENCY DEPT VISIT HI MDM: CPT

## 2018-09-18 PROCEDURE — 87147 CULTURE TYPE IMMUNOLOGIC: CPT

## 2018-09-18 PROCEDURE — 2580000003 HC RX 258: Performed by: EMERGENCY MEDICINE

## 2018-09-18 PROCEDURE — 87075 CULTR BACTERIA EXCEPT BLOOD: CPT

## 2018-09-18 PROCEDURE — 87205 SMEAR GRAM STAIN: CPT

## 2018-09-18 PROCEDURE — 96366 THER/PROPH/DIAG IV INF ADDON: CPT

## 2018-09-18 PROCEDURE — 1200000000 HC SEMI PRIVATE

## 2018-09-18 PROCEDURE — 87077 CULTURE AEROBIC IDENTIFY: CPT

## 2018-09-18 PROCEDURE — 2709999900 HC NON-CHARGEABLE SUPPLY

## 2018-09-18 PROCEDURE — 6360000002 HC RX W HCPCS: Performed by: INTERNAL MEDICINE

## 2018-09-18 PROCEDURE — 82248 BILIRUBIN DIRECT: CPT

## 2018-09-18 PROCEDURE — 99221 1ST HOSP IP/OBS SF/LOW 40: CPT | Performed by: SURGERY

## 2018-09-18 PROCEDURE — 81001 URINALYSIS AUTO W/SCOPE: CPT

## 2018-09-18 PROCEDURE — 85651 RBC SED RATE NONAUTOMATED: CPT

## 2018-09-18 PROCEDURE — 2580000003 HC RX 258: Performed by: INTERNAL MEDICINE

## 2018-09-18 PROCEDURE — 82948 REAGENT STRIP/BLOOD GLUCOSE: CPT

## 2018-09-18 PROCEDURE — 94760 N-INVAS EAR/PLS OXIMETRY 1: CPT

## 2018-09-18 PROCEDURE — 85025 COMPLETE CBC W/AUTO DIFF WBC: CPT

## 2018-09-18 PROCEDURE — 93010 ELECTROCARDIOGRAM REPORT: CPT | Performed by: INTERNAL MEDICINE

## 2018-09-18 PROCEDURE — 87040 BLOOD CULTURE FOR BACTERIA: CPT

## 2018-09-18 PROCEDURE — 83605 ASSAY OF LACTIC ACID: CPT

## 2018-09-18 PROCEDURE — 96365 THER/PROPH/DIAG IV INF INIT: CPT

## 2018-09-18 PROCEDURE — 6370000000 HC RX 637 (ALT 250 FOR IP): Performed by: EMERGENCY MEDICINE

## 2018-09-18 PROCEDURE — 99223 1ST HOSP IP/OBS HIGH 75: CPT | Performed by: INTERNAL MEDICINE

## 2018-09-18 PROCEDURE — 6360000002 HC RX W HCPCS: Performed by: EMERGENCY MEDICINE

## 2018-09-18 PROCEDURE — 80053 COMPREHEN METABOLIC PANEL: CPT

## 2018-09-18 PROCEDURE — 93005 ELECTROCARDIOGRAM TRACING: CPT | Performed by: EMERGENCY MEDICINE

## 2018-09-18 RX ORDER — INSULIN GLARGINE 100 [IU]/ML
40 INJECTION, SOLUTION SUBCUTANEOUS 2 TIMES DAILY
Status: DISCONTINUED | OUTPATIENT
Start: 2018-09-18 | End: 2018-09-20

## 2018-09-18 RX ORDER — POTASSIUM CHLORIDE 20 MEQ/1
40 TABLET, EXTENDED RELEASE ORAL PRN
Status: DISCONTINUED | OUTPATIENT
Start: 2018-09-18 | End: 2018-09-21 | Stop reason: HOSPADM

## 2018-09-18 RX ORDER — SODIUM CHLORIDE 0.9 % (FLUSH) 0.9 %
10 SYRINGE (ML) INJECTION PRN
Status: DISCONTINUED | OUTPATIENT
Start: 2018-09-18 | End: 2018-09-21 | Stop reason: HOSPADM

## 2018-09-18 RX ORDER — POTASSIUM CHLORIDE 7.45 MG/ML
10 INJECTION INTRAVENOUS PRN
Status: DISCONTINUED | OUTPATIENT
Start: 2018-09-18 | End: 2018-09-21 | Stop reason: HOSPADM

## 2018-09-18 RX ORDER — ACETAMINOPHEN 325 MG/1
650 TABLET ORAL EVERY 4 HOURS PRN
Status: DISCONTINUED | OUTPATIENT
Start: 2018-09-18 | End: 2018-09-21 | Stop reason: HOSPADM

## 2018-09-18 RX ORDER — 0.9 % SODIUM CHLORIDE 0.9 %
30 INTRAVENOUS SOLUTION INTRAVENOUS ONCE
Status: COMPLETED | OUTPATIENT
Start: 2018-09-18 | End: 2018-09-18

## 2018-09-18 RX ORDER — SODIUM CHLORIDE 0.9 % (FLUSH) 0.9 %
10 SYRINGE (ML) INJECTION EVERY 12 HOURS SCHEDULED
Status: DISCONTINUED | OUTPATIENT
Start: 2018-09-18 | End: 2018-09-21 | Stop reason: HOSPADM

## 2018-09-18 RX ORDER — NICOTINE POLACRILEX 4 MG
15 LOZENGE BUCCAL PRN
Status: DISCONTINUED | OUTPATIENT
Start: 2018-09-18 | End: 2018-09-21 | Stop reason: HOSPADM

## 2018-09-18 RX ORDER — NICOTINE 21 MG/24HR
1 PATCH, TRANSDERMAL 24 HOURS TRANSDERMAL EVERY 24 HOURS
Status: DISCONTINUED | OUTPATIENT
Start: 2018-09-18 | End: 2018-09-21 | Stop reason: HOSPADM

## 2018-09-18 RX ORDER — TRAMADOL HYDROCHLORIDE 50 MG/1
50 TABLET ORAL EVERY 6 HOURS PRN
Status: DISCONTINUED | OUTPATIENT
Start: 2018-09-18 | End: 2018-09-21 | Stop reason: HOSPADM

## 2018-09-18 RX ORDER — DEXTROSE MONOHYDRATE 50 MG/ML
100 INJECTION, SOLUTION INTRAVENOUS PRN
Status: DISCONTINUED | OUTPATIENT
Start: 2018-09-18 | End: 2018-09-21 | Stop reason: HOSPADM

## 2018-09-18 RX ORDER — SODIUM CHLORIDE 9 MG/ML
INJECTION, SOLUTION INTRAVENOUS CONTINUOUS
Status: DISCONTINUED | OUTPATIENT
Start: 2018-09-18 | End: 2018-09-21

## 2018-09-18 RX ORDER — ACETAMINOPHEN 500 MG
1000 TABLET ORAL ONCE
Status: COMPLETED | OUTPATIENT
Start: 2018-09-18 | End: 2018-09-18

## 2018-09-18 RX ORDER — POTASSIUM CHLORIDE 20MEQ/15ML
40 LIQUID (ML) ORAL PRN
Status: DISCONTINUED | OUTPATIENT
Start: 2018-09-18 | End: 2018-09-21 | Stop reason: HOSPADM

## 2018-09-18 RX ORDER — BENZONATATE 100 MG/1
200 CAPSULE ORAL 3 TIMES DAILY PRN
Status: DISCONTINUED | OUTPATIENT
Start: 2018-09-18 | End: 2018-09-21 | Stop reason: HOSPADM

## 2018-09-18 RX ORDER — LACTOBACILLUS RHAMNOSUS GG 10B CELL
1 CAPSULE ORAL
Status: DISCONTINUED | OUTPATIENT
Start: 2018-09-19 | End: 2018-09-21 | Stop reason: HOSPADM

## 2018-09-18 RX ORDER — ONDANSETRON 2 MG/ML
4 INJECTION INTRAMUSCULAR; INTRAVENOUS EVERY 6 HOURS PRN
Status: DISCONTINUED | OUTPATIENT
Start: 2018-09-18 | End: 2018-09-21 | Stop reason: HOSPADM

## 2018-09-18 RX ORDER — DEXTROSE MONOHYDRATE 25 G/50ML
12.5 INJECTION, SOLUTION INTRAVENOUS PRN
Status: DISCONTINUED | OUTPATIENT
Start: 2018-09-18 | End: 2018-09-21 | Stop reason: HOSPADM

## 2018-09-18 RX ORDER — ACETAMINOPHEN 500 MG
1000 TABLET ORAL EVERY 6 HOURS PRN
Status: ON HOLD | COMMUNITY
End: 2019-06-06

## 2018-09-18 RX ORDER — MIRTAZAPINE 15 MG/1
15 TABLET, FILM COATED ORAL NIGHTLY
Status: DISCONTINUED | OUTPATIENT
Start: 2018-09-18 | End: 2018-09-21 | Stop reason: HOSPADM

## 2018-09-18 RX ADMIN — INSULIN GLARGINE 40 UNITS: 100 INJECTION, SOLUTION SUBCUTANEOUS at 20:22

## 2018-09-18 RX ADMIN — SODIUM CHLORIDE: 9 INJECTION, SOLUTION INTRAVENOUS at 16:55

## 2018-09-18 RX ADMIN — Medication 10 ML: at 20:20

## 2018-09-18 RX ADMIN — VANCOMYCIN HYDROCHLORIDE 2000 MG: 1 INJECTION, POWDER, LYOPHILIZED, FOR SOLUTION INTRAVENOUS at 12:16

## 2018-09-18 RX ADMIN — Medication 20 UNITS: at 18:18

## 2018-09-18 RX ADMIN — Medication 10 UNITS: at 13:50

## 2018-09-18 RX ADMIN — MIRTAZAPINE 15 MG: 15 TABLET, FILM COATED ORAL at 20:19

## 2018-09-18 RX ADMIN — ACETAMINOPHEN 650 MG: 325 TABLET ORAL at 16:56

## 2018-09-18 RX ADMIN — VANCOMYCIN HYDROCHLORIDE 1250 MG: 5 INJECTION, POWDER, LYOPHILIZED, FOR SOLUTION INTRAVENOUS at 20:35

## 2018-09-18 RX ADMIN — ACETAMINOPHEN 1000 MG: 500 TABLET, FILM COATED ORAL at 11:19

## 2018-09-18 RX ADMIN — SODIUM CHLORIDE 4395 ML: 9 INJECTION, SOLUTION INTRAVENOUS at 11:19

## 2018-09-18 RX ADMIN — ACETAMINOPHEN 650 MG: 325 TABLET ORAL at 23:32

## 2018-09-18 RX ADMIN — MEROPENEM 500 MG: 1 INJECTION, POWDER, FOR SOLUTION INTRAVENOUS at 17:00

## 2018-09-18 ASSESSMENT — PAIN DESCRIPTION - PAIN TYPE
TYPE: ACUTE PAIN

## 2018-09-18 ASSESSMENT — PAIN DESCRIPTION - LOCATION
LOCATION: LEG

## 2018-09-18 ASSESSMENT — PAIN DESCRIPTION - ORIENTATION
ORIENTATION: RIGHT

## 2018-09-18 ASSESSMENT — ENCOUNTER SYMPTOMS
ABDOMINAL PAIN: 0
BACK PAIN: 0
WHEEZING: 0
SORE THROAT: 0
COUGH: 0
RHINORRHEA: 0
EYE DISCHARGE: 0
DIARRHEA: 0
EYE REDNESS: 0
VOMITING: 0
NAUSEA: 0
SHORTNESS OF BREATH: 0

## 2018-09-18 ASSESSMENT — PAIN DESCRIPTION - DESCRIPTORS: DESCRIPTORS: ACHING;DISCOMFORT

## 2018-09-18 ASSESSMENT — PAIN SCALES - GENERAL
PAINLEVEL_OUTOF10: 5
PAINLEVEL_OUTOF10: 0
PAINLEVEL_OUTOF10: 3
PAINLEVEL_OUTOF10: 0
PAINLEVEL_OUTOF10: 10
PAINLEVEL_OUTOF10: 0

## 2018-09-18 ASSESSMENT — PAIN DESCRIPTION - FREQUENCY: FREQUENCY: INTERMITTENT

## 2018-09-18 NOTE — PROGRESS NOTES
Pharmacy Note  Vancomycin Consult    Pierre Mccann is a 46 y.o. male started on Vancomycin for sepsis secondary to diabetic foot ulcer cellulitis; consult received from Dr. Cecile Fuentes to manage therapy. Also receiving the following antibiotics: Merrem. Patient Active Problem List   Diagnosis    Status post below knee amputation of right lower extremity (HCC)    Acute blood loss anemia    Gait disturbance    Sebaceous cyst    Uncontrolled type 2 diabetes mellitus with insulin therapy ([facility]    Severe bipolar II disorder, recent episode major depressive, remission ([facility]    Bipolar II disorder with melancholic features ([facility]    Leukocytosis    Lactic acidosis    Normocytic anemia    Obesity (BMI 30-39. 9)    Infection of above knee amputation stump of right leg (HCC)    History of noncompliance with medical treatment    Essential hypertension    Sepsis ([facility]       Allergies:  Pcn [penicillins] and Clindamycin/lincomycin     Temp max: 102.1    Recent Labs      09/17/18   1159  09/18/18   1205   BUN  18  9       Recent Labs      09/17/18   1159  09/18/18   1205   CREATININE  0.9  0.7       Recent Labs      09/17/18   1159  09/18/18   1205   WBC  17.9*  18.9*       No intake or output data in the 24 hours ending 09/18/18 1600    Culture Date Source Results   9/18/18 Blood x 2 sent   9/18/18 Leg sent       Ht Readings from Last 1 Encounters:   09/17/18 5' 6\" (1.676 m)        Wt Readings from Last 1 Encounters:   09/18/18 (!) 323 lb (146.5 kg)         Body mass index is 52.13 kg/m². CrCl: 113.9 mL/min (ideal body weight)      Assessment/Plan:  Patient received loading dose of vancomycin 2000 mg IVPB x 1 dose in ER 9/18 @ 1216. Will initiate vancomycin 1250 mg IV every 8 hours per protocol for obese patient < 48years old with adequate renal function. Will order trough prior to 4th overall dose of vancomycin 9/19/18 @ 1200. Thank you for the consult. Will continue to follow.     Nick Buckley, PharmD,

## 2018-09-18 NOTE — CONSULTS
shortness of breath. No abdominal pain. No other skin lesions or wounds that he is aware of. Recent x-rays did not demonstrate any osteomyelitis. Past Medical History:      Diagnosis Date    Bipolar 2 disorder ([address]     previously followed with Dr. Ha Mesa and Gabrielle Eng in [facility] Diabetes mellitus type 2, uncontrolled ([address]     Diabetic foot ulcer ([address]     Diabetic polyneuropathy ([address]     Diabetic ulcer of right foot associated with type 2 diabetes mellitus ([address] 12/10/2015    Essential hypertension     \"never been on b/p medication that I know of\"    GERD (gastroesophageal reflux disease)     Hammer toe of left foot     Heart murmur     denies any chest pain or palpitations    History of tobacco abuse     Hx of BKA, right (HCC)     Macular edema, diabetic, bilateral ([address] 05/04/2018    Dr. 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 when     Medications:  Prior to Admission medications    Medication Sig Start Date End Date Taking?  Authorizing Provider   acetaminophen (TYLENOL) 500 MG tablet Take 1,000 mg by mouth every 6 hours as needed for Pain   Yes Historical Provider, MD   insulin lispro (HUMALOG) 100 UNIT/ML injection vial Inject 20 Units into the skin 3 times daily (before meals) Patient also said he does additional units per a sliding scale if needed 18  Current Pulse Ox (24h):  SpO2: 97 %  Pulse Ox Range (24h):  SpO2  Av %  Min: 96 %  Max: 98 %  Oxygen Amount and Delivery:    CONSTITUTIONAL: Alert and oriented times 3, no acute distress and cooperative to examination with proper mood and affect. SKIN: Skin color, texture, turgor normal. No rashes or lesions. LYMPH: no cervical nodes, no inguinal nodes  HEENT: Head is normocephalic, atraumatic. EOMI, PERRLA. NECK: Supple, symmetrical, trachea midline, no adenopathy, thyroid symmetric, not enlarged and no tenderness, skin normal.  CHEST/LUNGS: chest symmetric with normal A/P diameter, normal respiratory rate and rhythm, lungs clear to auscultation without wheezes, rales or rhonchi. No accessory muscle use. Scars None   CARDIOVASCULAR: Heart sounds are normal.  Regular rate and rhythm. Normal S1 and S2.  ABDOMEN: Normal shape. Normal bowel sounds. Soft, nondistended, no masses or organomegaly. No evidence of incarcerated/strangulated hernia. Percussion: Normal without hepatosplenomegally. Tenderness: absent. RECTAL: deferred, not clinically indicated  NEUROLOGIC: There are no new acute focalizing motor or sensory deficits. CN II-XII are grossly intact. EXTREMITIES: no cyanosis, no clubbing. Right lower extremity BKA stump swollen and slightly erythematous. Mild yellow drainage from anterior and lateral wounds. Some necrosis on wounds. No obvious abscess/fluid collection seen. Mild foul smell. No current bleeding.   LABS:     Recent Labs      18   1158  18   1159  18   1205   WBC   --   17.9*  18.9*   HGB   --   14.6  13.3*   HCT   --   40.6*  37.9*   PLT   --   213  208   NA   --   127*  129*   K   --   4.3  4.5   CL   --   88*  93*   CO2   --   18*  20*   BUN   --   18  9   CREATININE   --   0.9  0.7   CALCIUM   --   9.0  8.0*   AST   --   9  18   ALT   --   7*  <5*   BILITOT   --   0.7  0.9   BILIDIR   --    --   <0.2   LIPASE   --    --   55.4*   NITRU  NEGATIVE   --    -- COLORU  YELLOW   --    --    BACTERIA  NONE   --    --      Cultures: Pending  RADIOLOGY:   I have personally reviewed the following films:    Narrative   PROCEDURE: XR CHEST PORTABLE       CLINICAL INFORMATION: fever, .       COMPARISON: Multiple previous most recent 9/17/2018       TECHNIQUE: AP upright view of the chest.       FINDINGS: Heart size is normal. There is no definite infiltrate.               Impression   Unremarkable portable chest.         Narrative   PROCEDURE: XR FEMUR RIGHT (MIN 2 VIEWS), XR TIBIA FIBULA RIGHT (2 VIEWS)       CLINICAL INFORMATION: osteomyelitis,  .       COMPARISON: No prior study.       TECHNIQUE: AP and lateral projections of the femur and remaining portion of the tibia and fibula       FINDINGS: Prior amputation below-knee. Images are underpenetrated. There is no evidence of acute fracture or bone destruction. There is questionable subcutaneous edema or soft tissue swelling around the distal soft tissues. There is no periostitis or    cortical bone destruction to suggest active osseous myelitis. There is a very small suprapatellar effusion.           Impression   Possible soft tissue swelling distal tibia fibula/scalp. No standard radiographic evidence of active osteomyelitis     Narrative   PROCEDURE: XR FEMUR RIGHT (MIN 2 VIEWS), XR TIBIA FIBULA RIGHT (2 VIEWS)       CLINICAL INFORMATION: osteomyelitis,  .       COMPARISON: No prior study.       TECHNIQUE: AP and lateral projections of the femur and remaining portion of the tibia and fibula       FINDINGS: Prior amputation below-knee. Images are underpenetrated. There is no evidence of acute fracture or bone destruction. There is questionable subcutaneous edema or soft tissue swelling around the distal soft tissues. There is no periostitis or    cortical bone destruction to suggest active osseous myelitis.  There is a very small suprapatellar effusion.           Impression   Possible soft tissue swelling distal tibia fibula/scalp. No standard radiographic evidence of active osteomyelitis           Thank you for the interesting evaluation. Further recommendations to follow.     Electronically signed by Annie Ahuja MD on 9/18/2018 at 5:54 PM

## 2018-09-18 NOTE — ED TRIAGE NOTES
Pt to ED room 6 per EMS with c/o an infected right stump. He reports that he was here yesterday with hyperglycemia and noted his BG to be over 300 this AM. He states that he did not correct his glucose at all today. 2 Areas of purulent drainage open noted on his right lower stump.

## 2018-09-18 NOTE — ED PROVIDER NOTES
[facility]  eMERGENCY dEPARTMENT eNCOUnter          CHIEF COMPLAINT       Chief Complaint   Patient presents with    Fever    Wound Infection     Right stump       Nurses Notes reviewed and I agree except as noted in the HPI. HISTORY OF PRESENT ILLNESS    Irma Up is a 46 y.o. male who presents to the Emergency Department via EMS for the evaluation of fever and infection of wounds on right stump. Patient reports that he has not been evaluated by anyone for wounds on right stump. He states that he noticed he was febrile (102) this morning. Patient reports purulent drainage from wounds. He denies any nausea, emesis, or chills. He rates his pain at 5/10 in severity. Patient reports that he was evaluate in the ED yesterday for uncontrolled diabetes. He was discharged home in stable condition. Patient denies any further complaints at this time. The HPI was provided by the patient. REVIEW OF SYSTEMS     Review of Systems   Constitutional: Positive for fever (102). Negative for appetite change, chills and fatigue. HENT: Negative for congestion, ear pain, rhinorrhea and sore throat. Eyes: Negative for discharge, redness and visual disturbance. Respiratory: Negative for cough, shortness of breath and wheezing. Cardiovascular: Negative for chest pain, palpitations and leg swelling. Gastrointestinal: Negative for abdominal pain, diarrhea, nausea and vomiting. Musculoskeletal: Negative for arthralgias, back pain, joint swelling and neck pain. Skin: Positive for wound (Right amputated lower limb). Negative for pallor and rash. Drainage from wounds   Allergic/Immunologic: Negative for environmental allergies. Neurological: Negative for dizziness, syncope, weakness, light-headedness, numbness and headaches. Hematological: Negative for adenopathy. Psychiatric/Behavioral: Negative for confusion and suicidal ideas. The patient is not nervous/anxious.         PAST MEDICAL HISTORY    has a past medical history of Bipolar 2 disorder ([facility] Diabetes mellitus type 2, uncontrolled ([facility] Diabetic foot ulcer ([facility] Diabetic polyneuropathy ([facility] Diabetic ulcer of right foot associated with type 2 diabetes mellitus ([facility] Essential hypertension; GERD (gastroesophageal reflux disease); Hammer toe of left foot; Heart murmur; History of tobacco abuse; Hx of BKA, right ([facility] Macular edema, diabetic, bilateral ([facility] Marijuana abuse in remission; Onychomycosis; and 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 SURGICAL HISTORY      has a past surgical history that includes other surgical history (Right, 1/14/15); Abscess Drainage (Right); Toe amputation (Right, 1/16/15); Foot Debridement (Right, 07/01/2016); Leg amputation below knee (Right, 07/20/2016); [place] tooth extraction (?when); and pr drain infect shoulder bursa (Left, 8/18/2017).     CURRENT MEDICATIONS       Current Discharge Medication List      CONTINUE these medications which have NOT CHANGED    Details   acetaminophen (TYLENOL) 500 MG tablet Take 1,000 mg by mouth every 6 hours as needed for Pain      insulin lispro (HUMALOG) 100 UNIT/ML injection vial Inject 20 Units into the skin 3 times daily (before meals) Patient also said he does additional units per a sliding scale if needed  Qty: 3 vial, Refills: 0    Associated Diagnoses: Poorly controlled type 2 diabetes mellitus (HCC)      insulin glargine (BASAGLAR KWIKPEN) 100 UNIT/ML injection pen Inject 40 Units into the skin 2 times daily  Qty: 5 pen, Refills: 0    Associated Diagnoses: Poorly controlled type 2 diabetes mellitus (HCC)      mirtazapine (REMERON) 15 MG tablet Take 1 tablet by mouth nightly  Qty: 30 tablet, Refills: 0    Associated Diagnoses: Bipolar 2 disorder (HCC)      sertraline (ZOLOFT) 100 MG tablet Take 1.5 tablets by mouth daily  Qty: 45 tablet, Refills: 0    Associated Diagnoses: Bipolar 2 disorder (HCC)      Lancets MISC Use to test blood sugars 4 times daily DX:E11.65  Qty: 100 each, Refills: 3    Associated Diagnoses: Uncontrolled type 2 diabetes mellitus with insulin therapy ([facility])      Insulin Syringe-Needle U-100 29G X 1/2\" 0.3 ML MISC 1 each by Does not apply route 4 times daily (after meals and at bedtime)  Qty: 100 each, Refills: 1      Insulin Pen Needle 32G X 4 MM MISC 1 each by Does not apply route daily  Qty: 100 each, Refills: 0      glucose monitoring kit (FREESTYLE) monitoring kit 1 kit by Does not apply route 4 times daily (after meals and at bedtime)  Qty: 1 kit, Refills: 0      blood glucose monitor strips Test blood sugars 4 times daily DX:E11.65  Qty: 400 strip, Refills: 1    Associated Diagnoses: Uncontrolled type 2 diabetes mellitus with insulin therapy (HCC)      Blood Glucose Monitoring Suppl LIANNE Use to test blood sugars DX:E11.65  Qty: 1 Device, Refills: 0    Associated Diagnoses: Uncontrolled type 2 diabetes mellitus with insulin therapy ([facility]             ALLERGIES     is allergic to pcn [penicillins] and clindamycin/lincomycin. FAMILY HISTORY     indicated that his mother is . He reported the following about his father: unknown. He indicated that the status of his maternal grandmother is unknown. He indicated that the status of his maternal grandfather is unknown.    family history includes Arthritis in his maternal grandfather; Depression in his mother; Diabetes in his mother; Early Death in his mother; Heart Disease in his maternal grandfather; High Blood Pressure in his mother; High Cholesterol in his mother; Other in his mother; Vision Loss in his maternal grandmother. SOCIAL HISTORY      reports that he has been smoking Cigars. He has a 65.00 pack-year smoking history. He has never used smokeless tobacco. He reports that he does not drink alcohol or use drugs. PHYSICAL EXAM     INITIAL VITALS:  height is 5' 6\" (1.676 m) and weight is 235 lb (106.6 kg).  His oral temperature is 100 °F (37.8 °C). His blood pressure is 138/72 and his pulse is 120. His respiration is 16 and oxygen saturation is 98%. Physical Exam   Constitutional: He is oriented to person, place, and time. He appears well-developed and well-nourished. Non-toxic appearance. HENT:   Head: Normocephalic and atraumatic. Right Ear: Tympanic membrane and external ear normal.   Left Ear: Tympanic membrane and external ear normal.   Nose: Nose normal.   Mouth/Throat: Oropharynx is clear and moist and mucous membranes are normal. No oropharyngeal exudate, posterior oropharyngeal edema or posterior oropharyngeal erythema. Eyes: Conjunctivae and EOM are normal.   Neck: Normal range of motion. Neck supple. No JVD present. Cardiovascular: Normal rate, regular rhythm, normal heart sounds, intact distal pulses and normal pulses. Exam reveals no gallop and no friction rub. No murmur heard. Pulmonary/Chest: Effort normal and breath sounds normal. No respiratory distress. He has no decreased breath sounds. He has no wheezes. He has no rhonchi. He has no rales. Abdominal: Soft. Bowel sounds are normal. He exhibits no distension. There is no tenderness. There is no rebound, no guarding and no CVA tenderness. Musculoskeletal: Normal range of motion. He exhibits no edema. Neurological: He is alert and oriented to person, place, and time. He exhibits normal muscle tone. Coordination normal.   Skin: Skin is warm and dry. No rash noted. He is not diaphoretic. Patient has three open wounds on right stump that are circular in shape with purulent drainage and surrounding erythema. Nursing note and vitals reviewed. DIFFERENTIAL DIAGNOSIS:   Including but not limited to: osteomyelitis, cellulitis     DIAGNOSTIC RESULTS     EKG: All EKG's are interpreted by the Emergency Department Physician who either signs or Co-signs this chart in the absence of a cardiologist.  EKG interpreted by Terra Harada, DO:  [place].  Rate: 121 bpm  MI interval: 146 ms  QRS duration: 80 ms  QTc: 460 ms  P-R-T axes: 42, 24, 51  Sinus tachycardia  Compared to old EKG on 17-Sep-2018      RADIOLOGY: non-plain film images(s) such as CT, Ultrasound and MRI are read by the radiologist.    XR CHEST PORTABLE   Final Result   Unremarkable portable chest.            **This report has been created using voice recognition software. It may contain minor errors which are inherent in voice recognition technology. **      Final report electronically signed by Dr. Tonya Felix on 9/18/2018 1:20 PM      XR FEMUR RIGHT (MIN 2 VIEWS)   Final Result   Possible soft tissue swelling distal tibia fibula/scalp. No standard radiographic evidence of active osteomyelitis            **This report has been created using voice recognition software. It may contain minor errors which are inherent in voice recognition technology. **      Final report electronically signed by Dr. Stanford Jordan on 9/18/2018 12:20 PM      XR TIBIA FIBULA RIGHT (2 VIEWS)   Final Result   Possible soft tissue swelling distal tibia fibula/scalp. No standard radiographic evidence of active osteomyelitis            **This report has been created using voice recognition software. It may contain minor errors which are inherent in voice recognition technology. **      Final report electronically signed by Dr. Stanford Jordan on 9/18/2018 12:20 PM          LABS:   Labs Reviewed   CBC WITH AUTO DIFFERENTIAL - Abnormal; Notable for the following:        Result Value    WBC 18.9 (*)     RBC 4.41 (*)     Hemoglobin 13.3 (*)     Hematocrit 37.9 (*)     Segs Absolute 15.6 (*)     Monocytes # 1.7 (*)     Immature Grans (Abs) 0.22 (*)     All other components within normal limits   BASIC METABOLIC PANEL - Abnormal; Notable for the following:     Sodium 129 (*)     Chloride 93 (*)     CO2 20 (*)     Glucose 349 (*)     Calcium 8.0 (*)     All other components within normal limits   HEPATIC FUNCTION PANEL - Abnormal; Notable for the Efforts were made to edit the dictations but occasionally words are mis-transcribed.)    Scribe:  Mike Jacobo 9/18/18 11:16 AM Scribing for and in the presence of Mike Jacobo DO. Signed by: Denia Teran, 09/18/18 8:23 PM    Provider:  I personally performed the services described in the documentation, reviewed and edited the documentation which was dictated to the scribe in my presence, and it accurately records my words and actions.     Mike Jacobo DO 9/18/18 8:23 PM       Mike Jacobo DO  09/18/18 2023

## 2018-09-18 NOTE — H&P
meals and at bedtime) 8/23/18   Josué Khan PA-C   Insulin Pen Needle 32G X 4 MM MISC 1 each by Does not apply route daily 8/23/18   Josué Khan PA-C   insulin lispro (HUMALOG) 100 UNIT/ML injection vial Inject 20 Units into the skin 3 times daily (before meals) Patient also said he does additional units per a sliding scale if needed 8/23/18   Josué Khan PA-C   insulin glargine (BASAGLAR KWIKPEN) 100 UNIT/ML injection pen Inject 40 Units into the skin 2 times daily 8/23/18   Josué Khan PA-C   glucose monitoring kit (FREESTYLE) monitoring kit 1 kit by Does not apply route 4 times daily (after meals and at bedtime) 8/23/18   Josué Khan PA-C   blood glucose monitor strips Test blood sugars 4 times daily DX:E11.65 8/23/18   Josué Khan PA-C   mirtazapine (REMERON) 15 MG tablet Take 1 tablet by mouth nightly 4/23/18   Garr Bosworth, APRN - CNP   sertraline (ZOLOFT) 100 MG tablet Take 1.5 tablets by mouth daily 4/23/18   Garr Bosworth, APRN - CNP   Blood Glucose Monitoring Suppl LIANNE Use to test blood sugars DX:E11.65 4/17/18   Garr Bosworth, APRN - CNP         Allergies     Pcn [penicillins] and Clindamycin/lincomycin      Family History       Family History   Problem Relation Age of Onset    Diabetes Mother     Other Mother         pneumonia, H1N1    Depression Mother     Early Death Mother     High Blood Pressure Mother     High Cholesterol Mother     Vision Loss Maternal Grandmother     Arthritis Maternal Grandfather     Heart Disease Maternal Grandfather        Social History       Social History     Social History    Marital status:      Spouse name: N/A    Number of children: 2    Years of education: 15     Social History Main Topics    Smoking status: Current Every Day Smoker     Packs/day: 5.00     Years: 13.00     Types: Cigars     Last attempt to quit: 4/9/2018    Smokeless tobacco: Never Used      Comment: 5 cigars a day    Alcohol use No      Comment: last drink \"several years ago\"    Drug use: No      Comment: as a teenager only    Sexual activity: Yes     Partners: Female     Other Topics Concern    None     Social History Narrative    None         TOBACCO:   reports that he has been smoking Cigars. He has a 65.00 pack-year smoking history. He has never used smokeless tobacco.  ETOH:   reports that he does not drink alcohol. Review of systems   Pertinent positives as noted in the HPI. All other systems reviewed and negative. ROS    Physical examination     /74   Pulse 114   Temp 102.1 °F (38.9 °C) (Oral)   Resp 18   Wt (!) 323 lb (146.5 kg)   SpO2 97%   BMI 52.13 kg/m²     General appearance:  No apparent distress, appears stated age and cooperative. Patient is sitting up in bed eating and does not appear to be in acute distress. HEENT:  Normocephalic. Pupils equal, round, and reactive to light. Extraocular motion intact. Nose symmetric without evidence of discharge. Oral mucosa slightly dry. Neck: Supple, with full range of motion. No lymph node swelling. Cardiovascular:  Tachycardia. S1/S2 heard. I was unable to appreciate any murmurs, rubs or gallops. Respiratory:  Normal respiratory effort. Clear to auscultation, bilaterally without Rales/Wheezes/Rhonchi. Abdomen: Soft, non-tender, non-distended with normal bowel sounds. Musculoskeletal: Right below the knee amputation. Range of motion preserved at the right knee and hip joint. Neurologic: No focal sensory/motor deficits. Cranial nerves: II-XII intact, grossly non-focal.  Lymphatic: No cervical lymphadenopathy. Psychiatric:  Alert and oriented. Normal judgement. Conversational.  Vascular: Dorsalis pedis and radial pulses palpable bilaterally 2+. Brisk capillary refill. <3 seconds at the bilateral radial pulses. Diminished left dorsalis pedis pulse. Genitourinary: Deferred.    Skin: Increased erythema, edema tenderness to palpation at the right anterior lower extremity raised ulcer just distal to knee  with foul-smelling odor emanating from it. Dark colored drainage noted. At least 1 cm in diameter  Lateral raised  ulcer at right lower extremity with granulomatous tissue. Labs and imaging     Recent Labs      09/17/18   1159  09/18/18   1205   WBC  17.9*  18.9*   HGB  14.6  13.3*   HCT  40.6*  37.9*   PLT  213  208     Recent Labs      09/17/18   1159  09/18/18   1205   NA  127*  129*   K  4.3  4.5   CL  88*  93*   CO2  18*  20*   BUN  18  9   CREATININE  0.9  0.7   CALCIUM  9.0  8.0*     Recent Labs      09/17/18   1159  09/18/18   1205   AST  9  18   ALT  7*  <5*   BILIDIR   --   <0.2   BILITOT  0.7  0.9   ALKPHOS  87  88     No results for input(s): INR in the last 72 hours. No results for input(s): Nicolle Nap in the last 72 hours. Urinalysis:      Lab Results   Component Value Date    NITRU NEGATIVE 09/17/2018    WBCUA 25-50 09/17/2018    BACTERIA NONE 09/17/2018    RBCUA 0-2 09/17/2018    BLOODU TRACE 09/17/2018    SPECGRAV 1.021 08/24/2016    GLUCOSEU >= 1000 09/17/2018       Radiology:     CXR: I have reviewed the CXR with the following interpretation: No acute cardiopulmonary process with  atelactasis at the base of lungs. EKG:  I have reviewed the EKG with the following interpretation: Sinus tachycardia with HR to 121. Xr Chest Standard (2 Vw)    Result Date: 9/17/2018  PROCEDURE: XR CHEST (2 VW) CLINICAL INFORMATION: Cough and shortness of breath, . COMPARISON: Multiple previous most recent 4/3/18 TECHNIQUE: PA and lateral views the chest. FINDINGS: Mildly low lung volumes. Heart size appears normal for technique. No pleural effusions. No acute infiltrate. No acute disease. **This report has been created using voice recognition software. It may contain minor errors which are inherent in voice recognition technology. ** Final report electronically signed by Dr. Dilma Collins on 9/17/2018 12:18 PM    Xr Femur Right (min 2 Views)    Result Date: 9/18/2018  PROCEDURE: XR FEMUR RIGHT (MIN 2 VIEWS), XR TIBIA FIBULA RIGHT (2 VIEWS) CLINICAL INFORMATION: osteomyelitis,  . COMPARISON: No prior study. TECHNIQUE: AP and lateral projections of the femur and remaining portion of the tibia and fibula FINDINGS: Prior amputation below-knee. Images are underpenetrated. There is no evidence of acute fracture or bone destruction. There is questionable subcutaneous edema or soft tissue swelling around the distal soft tissues. There is no periostitis or cortical bone destruction to suggest active osseous myelitis. There is a very small suprapatellar effusion. Possible soft tissue swelling distal tibia fibula/scalp. No standard radiographic evidence of active osteomyelitis **This report has been created using voice recognition software. It may contain minor errors which are inherent in voice recognition technology. ** Final report electronically signed by Dr. Leon Han on 9/18/2018 12:20 PM    Xr Tibia Fibula Right (2 Views)    Result Date: 9/18/2018  PROCEDURE: XR FEMUR RIGHT (MIN 2 VIEWS), XR TIBIA FIBULA RIGHT (2 VIEWS) CLINICAL INFORMATION: osteomyelitis,  . COMPARISON: No prior study. TECHNIQUE: AP and lateral projections of the femur and remaining portion of the tibia and fibula FINDINGS: Prior amputation below-knee. Images are underpenetrated. There is no evidence of acute fracture or bone destruction. There is questionable subcutaneous edema or soft tissue swelling around the distal soft tissues. There is no periostitis or cortical bone destruction to suggest active osseous myelitis. There is a very small suprapatellar effusion. Possible soft tissue swelling distal tibia fibula/scalp. No standard radiographic evidence of active osteomyelitis **This report has been created using voice recognition software. It may contain minor errors which are inherent in voice recognition technology. ** Final report electronically signed by Dr. Leon Han on 9/18/2018 12:20 PM    Cta Chest W Wo Contrast    Result Date: 9/17/2018  PROCEDURE: CTA CHEST W WO CONTRAST CLINICAL INFORMATION: intermittent chest pain and SOB, elevated d-dimer, attention PE. COMPARISON: Chest x-ray 9/17/2018 TECHNIQUE: 3 mm axial images were obtained through the chest after the administration of IV contrast.  A non-contrast localizer was obtained. 3D reconstructions were performed on the scanner to include MIP images through the right and left pulmonary arteries and sagittal images through the chest. Isovue was the intravenous contrast utilized. All CT scans at this facility use dose modulation, iterative reconstruction, and/or weight-based dosing when appropriate to reduce radiation dose to as low as reasonably achievable. FINDINGS: Pulmonary artery enhancement is mildly suboptimal. There are no gross pulmonary emboli. The aorta is within acceptable limits. The heart size is normal. There is no pericardial or pleural effusion. There is no mediastinal, axillary or hilar adenopathy. Dependent atelectasis is suggested. No definite confluent infiltrate. No suspicious osseous lesions are present. There are no suspicious findings in the imaged aspects of the upper abdomen. No pulmonary emboli or pulmonary infiltrates. **This report has been created using voice recognition software. It may contain minor errors which are inherent in voice recognition technology. ** Final report electronically signed by Dr. Harsh Okeefe on 9/17/2018 1:54 PM    Xr Chest Portable    Result Date: 9/18/2018  PROCEDURE: XR CHEST PORTABLE CLINICAL INFORMATION: fever, . COMPARISON: Multiple previous most recent 9/17/2018 TECHNIQUE: AP upright view of the chest. FINDINGS: Heart size is normal. There is no definite infiltrate. Unremarkable portable chest. **This report has been created using voice recognition software. It may contain minor errors which are inherent in voice recognition technology. ** Final report electronically

## 2018-09-18 NOTE — CONSULTS
Consults                                      CONSULTATION NOTE :ID       Patient - Alec Tapia,  Age - 46 y.o.    - 1970      Room Number - 6L-12/937-A   N -  [id number]   [facility]  Date of Admission -  2018 10:59 AM  Patient's PCP: No primary care provider on file. Requesting Physician: Shikha Sharif MD    REASON FOR CONSULTATION   prulent drainage from the right BKA stump    HISTORY OF PRESENT ILLNESS       This is a very pleasant 46 y.o. male who was admitted to the hospital with a chief complaints of drainage from the right BKA. He is known to me from his past hospitalization. Compliance has been an issue, he had a wound on the stump before related to the prosthesis. He was never taking off his prosthesis as the result had wound and draiange. He has pain, the stump has open wound and was noted to have prulent drainage. Started on iv antibiotic.  Seen by surgeon    PAST MEDICAL AND SURGICAL HISTORY       Past Medical History:   Diagnosis Date    Bipolar 2 disorder ([address]     previously followed with Dr. Anayeli Ferris and Dequan Fernandez in \[facility] [place] [facility]\"" Diabetes mellitus type 2, uncontrolled ([address]     Diabetic foot ulcer ([address]     Diabetic polyneuropathy ([address]     Diabetic ulcer of right foot associated with type 2 diabetes mellitus ([address] 12/10/2015    Essential hypertension     \"never been on b/p medication that I know of\"    GERD (gastroesophageal reflux disease)     Hammer toe of left foot     Heart murmur     denies any chest pain or palpitations    History of tobacco abuse     Hx of BKA, right (HCC)     Macular edema, diabetic, bilateral ([address] 2018    Dr. Marylin Rosa referred to retina specialist for 2nd opinion    Marijuana abuse in remission     Onychomycosis     WD-Skin ulcer of fourth toe of right foot with necrosis of bone ([address] 2016       Past Surgical History:   Procedure Laterality Date    ABSCESS DRAINAGE Right     foot    FOOT last 72 hours. Glucose:  Recent Labs      09/17/18   1441  09/18/18   1348  09/18/18   1717   POCGLU  357*  347*  306*     HgbA1C:   Recent Labs      09/18/18   1741   LABA1C  11.0*     INR: No results for input(s): INR in the last 72 hours. Hepatic:   Recent Labs      09/18/18   1205   ALKPHOS  88   ALT  <5*   AST  18   PROT  6.6   BILITOT  0.9   BILIDIR  <0.2   LABALBU  3.2*     Amylase and Lipase:  Recent Labs      09/18/18   1741   LACTA  1.8     Lactic Acid:   Recent Labs      09/18/18   1741   LACTA  1.8     Troponin: No results for input(s): CKTOTAL, CKMB, TROPONINI in the last 72 hours. BNP: No results for input(s): BNP in the last 72 hours. Lipids: No results for input(s): CHOL, TRIG, HDL, LDLCALC in the last 72 hours.     Invalid input(s): LDL  ABGs: No results found for: PHART, PO2ART, ADH6YXH, OJN6XMA, BEART    Cultures:      CXR:       UA:   Recent Labs      09/17/18   1158  09/18/18   1713   SPECGRAV   --   >1.030*   PHUR  5.0  5.5   COLORU  YELLOW  YELLOW   PROTEINU  NEGATIVE  TRACE*   BLOODU  TRACE*  TRACE*   RBCUA  0-2  0-2   WBCUA  25-50  50-75   BACTERIA  NONE  NONE   NITRU  NEGATIVE  NEGATIVE   GLUCOSEU  >= 1000*   --    BILIRUBINUR  NEGATIVE  NEGATIVE   UROBILINOGEN  0.2  1.0   KETUA  15*  15*   LABCAST   --   NONE SEEN  NONE SEEN         IMAGING:    Micro:   Lab Results   Component Value Date    BC No growth-preliminary  No growth   04/03/2018       Problem list of patient      Patient Active Problem List   Diagnosis Code    Status post below knee amputation of right lower extremity (HCC) Z89.511    Acute blood loss anemia D62    Gait disturbance R26.9    Sebaceous cyst L72.3    Uncontrolled type 2 diabetes mellitus with insulin therapy ([facility]) E11.65, Z79.4    Severe bipolar II disorder, recent episode major depressive, remission ([facility]) F31.81    Bipolar affective disorder ([facility]) F31.9    Leukocytosis D72.829    Lactic acidosis E87.2    Dehydration E86.0    Hyponatremia E87.1   

## 2018-09-19 ENCOUNTER — TELEPHONE (OUTPATIENT)
Dept: FAMILY MEDICINE CLINIC | Age: 48
End: 2018-09-19

## 2018-09-19 ENCOUNTER — TELEPHONE (OUTPATIENT)
Dept: INTERNAL MEDICINE | Age: 48
End: 2018-09-19

## 2018-09-19 LAB
ANION GAP SERPL CALCULATED.3IONS-SCNC: 12 MEQ/L (ref 8–16)
BASOPHILS # BLD: 0.4 %
BASOPHILS ABSOLUTE: 0.1 THOU/MM3 (ref 0–0.1)
BUN BLDV-MCNC: 7 MG/DL (ref 7–22)
CALCIUM SERPL-MCNC: 7.4 MG/DL (ref 8.5–10.5)
CHLORIDE BLD-SCNC: 98 MEQ/L (ref 98–111)
CO2: 18 MEQ/L (ref 23–33)
CREAT SERPL-MCNC: 0.7 MG/DL (ref 0.4–1.2)
DOHLE BODIES: PRESENT
EOSINOPHIL # BLD: 0.3 %
EOSINOPHILS ABSOLUTE: 0.1 THOU/MM3 (ref 0–0.4)
ERYTHROCYTE [DISTWIDTH] IN BLOOD BY AUTOMATED COUNT: 13.6 % (ref 11.5–14.5)
ERYTHROCYTE [DISTWIDTH] IN BLOOD BY AUTOMATED COUNT: 42.9 FL (ref 35–45)
GFR SERPL CREATININE-BSD FRML MDRD: > 90 ML/MIN/1.73M2
GLUCOSE BLD-MCNC: 235 MG/DL (ref 70–108)
GLUCOSE BLD-MCNC: 238 MG/DL (ref 70–108)
GLUCOSE BLD-MCNC: 246 MG/DL (ref 70–108)
GLUCOSE BLD-MCNC: 287 MG/DL (ref 70–108)
GLUCOSE BLD-MCNC: 294 MG/DL (ref 70–108)
HCT VFR BLD CALC: 37 % (ref 42–52)
HEMOGLOBIN: 13 GM/DL (ref 14–18)
IMMATURE GRANS (ABS): 0.19 THOU/MM3 (ref 0–0.07)
IMMATURE GRANULOCYTES: 1.1 %
LACTIC ACID: 1.4 MMOL/L (ref 0.5–2.2)
LYMPHOCYTES # BLD: 10.2 %
LYMPHOCYTES ABSOLUTE: 1.8 THOU/MM3 (ref 1–4.8)
MCH RBC QN AUTO: 30.4 PG (ref 26–33)
MCHC RBC AUTO-ENTMCNC: 35.1 GM/DL (ref 32.2–35.5)
MCV RBC AUTO: 86.4 FL (ref 80–94)
MONOCYTES # BLD: 10.3 %
MONOCYTES ABSOLUTE: 1.8 THOU/MM3 (ref 0.4–1.3)
NUCLEATED RED BLOOD CELLS: 0 /100 WBC
PATHOLOGIST REVIEW: ABNORMAL
PLATELET # BLD: 207 THOU/MM3 (ref 130–400)
PMV BLD AUTO: 9.7 FL (ref 9.4–12.4)
POTASSIUM SERPL-SCNC: 3.7 MEQ/L (ref 3.5–5.2)
RBC # BLD: 4.28 MILL/MM3 (ref 4.7–6.1)
SCAN OF BLOOD SMEAR: NORMAL
SEG NEUTROPHILS: 77.7 %
SEGMENTED NEUTROPHILS ABSOLUTE COUNT: 13.5 THOU/MM3 (ref 1.8–7.7)
SODIUM BLD-SCNC: 128 MEQ/L (ref 135–145)
VANCOMYCIN TROUGH: 14.1 UG/ML (ref 5–15)
WBC # BLD: 17.4 THOU/MM3 (ref 4.8–10.8)

## 2018-09-19 PROCEDURE — APPSS30 APP SPLIT SHARED TIME 16-30 MINUTES: Performed by: NURSE PRACTITIONER

## 2018-09-19 PROCEDURE — 83605 ASSAY OF LACTIC ACID: CPT

## 2018-09-19 PROCEDURE — G8988 SELF CARE GOAL STATUS: HCPCS

## 2018-09-19 PROCEDURE — 99233 SBSQ HOSP IP/OBS HIGH 50: CPT | Performed by: INTERNAL MEDICINE

## 2018-09-19 PROCEDURE — 85025 COMPLETE CBC W/AUTO DIFF WBC: CPT

## 2018-09-19 PROCEDURE — 93010 ELECTROCARDIOGRAM REPORT: CPT | Performed by: INTERNAL MEDICINE

## 2018-09-19 PROCEDURE — 80048 BASIC METABOLIC PNL TOTAL CA: CPT

## 2018-09-19 PROCEDURE — 82948 REAGENT STRIP/BLOOD GLUCOSE: CPT

## 2018-09-19 PROCEDURE — 2709999900 HC NON-CHARGEABLE SUPPLY

## 2018-09-19 PROCEDURE — 97166 OT EVAL MOD COMPLEX 45 MIN: CPT

## 2018-09-19 PROCEDURE — 6360000002 HC RX W HCPCS: Performed by: INTERNAL MEDICINE

## 2018-09-19 PROCEDURE — G8978 MOBILITY CURRENT STATUS: HCPCS

## 2018-09-19 PROCEDURE — 99232 SBSQ HOSP IP/OBS MODERATE 35: CPT | Performed by: SURGERY

## 2018-09-19 PROCEDURE — 6370000000 HC RX 637 (ALT 250 FOR IP): Performed by: INTERNAL MEDICINE

## 2018-09-19 PROCEDURE — 97535 SELF CARE MNGMENT TRAINING: CPT

## 2018-09-19 PROCEDURE — 1200000000 HC SEMI PRIVATE

## 2018-09-19 PROCEDURE — G8979 MOBILITY GOAL STATUS: HCPCS

## 2018-09-19 PROCEDURE — 36415 COLL VENOUS BLD VENIPUNCTURE: CPT

## 2018-09-19 PROCEDURE — 6370000000 HC RX 637 (ALT 250 FOR IP): Performed by: FAMILY MEDICINE

## 2018-09-19 PROCEDURE — 80202 ASSAY OF VANCOMYCIN: CPT

## 2018-09-19 PROCEDURE — 2580000003 HC RX 258: Performed by: INTERNAL MEDICINE

## 2018-09-19 PROCEDURE — 97162 PT EVAL MOD COMPLEX 30 MIN: CPT

## 2018-09-19 PROCEDURE — 97530 THERAPEUTIC ACTIVITIES: CPT

## 2018-09-19 PROCEDURE — G8987 SELF CARE CURRENT STATUS: HCPCS

## 2018-09-19 RX ORDER — OYSTER SHELL CALCIUM WITH VITAMIN D 500; 200 MG/1; [IU]/1
1 TABLET, FILM COATED ORAL 2 TIMES DAILY
Status: DISCONTINUED | OUTPATIENT
Start: 2018-09-19 | End: 2018-09-21 | Stop reason: HOSPADM

## 2018-09-19 RX ADMIN — VANCOMYCIN HYDROCHLORIDE 1250 MG: 5 INJECTION, POWDER, LYOPHILIZED, FOR SOLUTION INTRAVENOUS at 03:50

## 2018-09-19 RX ADMIN — TRAMADOL HYDROCHLORIDE 50 MG: 50 TABLET, FILM COATED ORAL at 12:15

## 2018-09-19 RX ADMIN — ACETAMINOPHEN 650 MG: 325 TABLET ORAL at 08:16

## 2018-09-19 RX ADMIN — CALCIUM CARBONATE-VITAMIN D TAB 500 MG-200 UNIT 1 TABLET: 500-200 TAB at 15:54

## 2018-09-19 RX ADMIN — ENOXAPARIN SODIUM 40 MG: 40 INJECTION SUBCUTANEOUS at 15:58

## 2018-09-19 RX ADMIN — INSULIN GLARGINE 40 UNITS: 100 INJECTION, SOLUTION SUBCUTANEOUS at 22:46

## 2018-09-19 RX ADMIN — ACETAMINOPHEN 650 MG: 325 TABLET ORAL at 20:39

## 2018-09-19 RX ADMIN — MEROPENEM 500 MG: 1 INJECTION, POWDER, FOR SOLUTION INTRAVENOUS at 08:30

## 2018-09-19 RX ADMIN — SODIUM CHLORIDE: 9 INJECTION, SOLUTION INTRAVENOUS at 01:23

## 2018-09-19 RX ADMIN — ONDANSETRON 4 MG: 2 INJECTION INTRAMUSCULAR; INTRAVENOUS at 20:39

## 2018-09-19 RX ADMIN — VANCOMYCIN HYDROCHLORIDE 1250 MG: 5 INJECTION, POWDER, LYOPHILIZED, FOR SOLUTION INTRAVENOUS at 13:00

## 2018-09-19 RX ADMIN — BENZONATATE 200 MG: 100 CAPSULE ORAL at 22:46

## 2018-09-19 RX ADMIN — TRAMADOL HYDROCHLORIDE 50 MG: 50 TABLET, FILM COATED ORAL at 22:46

## 2018-09-19 RX ADMIN — Medication 20 UNITS: at 12:37

## 2018-09-19 RX ADMIN — Medication 10 ML: at 08:30

## 2018-09-19 RX ADMIN — CALCIUM CARBONATE-VITAMIN D TAB 500 MG-200 UNIT 1 TABLET: 500-200 TAB at 22:46

## 2018-09-19 RX ADMIN — BENZONATATE 200 MG: 100 CAPSULE ORAL at 01:19

## 2018-09-19 RX ADMIN — INSULIN GLARGINE 40 UNITS: 100 INJECTION, SOLUTION SUBCUTANEOUS at 08:25

## 2018-09-19 RX ADMIN — SODIUM CHLORIDE: 9 INJECTION, SOLUTION INTRAVENOUS at 16:47

## 2018-09-19 RX ADMIN — SERTRALINE 150 MG: 100 TABLET, FILM COATED ORAL at 08:30

## 2018-09-19 RX ADMIN — MEROPENEM 500 MG: 1 INJECTION, POWDER, FOR SOLUTION INTRAVENOUS at 17:24

## 2018-09-19 RX ADMIN — Medication 20 UNITS: at 16:46

## 2018-09-19 RX ADMIN — Medication 20 UNITS: at 08:23

## 2018-09-19 RX ADMIN — Medication 1 CAPSULE: at 08:30

## 2018-09-19 RX ADMIN — MEROPENEM 500 MG: 1 INJECTION, POWDER, FOR SOLUTION INTRAVENOUS at 01:19

## 2018-09-19 RX ADMIN — ACETAMINOPHEN 650 MG: 325 TABLET ORAL at 13:43

## 2018-09-19 RX ADMIN — MIRTAZAPINE 15 MG: 15 TABLET, FILM COATED ORAL at 22:46

## 2018-09-19 ASSESSMENT — PAIN DESCRIPTION - ORIENTATION
ORIENTATION: RIGHT

## 2018-09-19 ASSESSMENT — PAIN DESCRIPTION - FREQUENCY
FREQUENCY: INTERMITTENT
FREQUENCY: INTERMITTENT

## 2018-09-19 ASSESSMENT — PAIN DESCRIPTION - DESCRIPTORS
DESCRIPTORS: ACHING;DISCOMFORT
DESCRIPTORS: ACHING;DISCOMFORT

## 2018-09-19 ASSESSMENT — PAIN SCALES - GENERAL
PAINLEVEL_OUTOF10: 8
PAINLEVEL_OUTOF10: 7
PAINLEVEL_OUTOF10: 9
PAINLEVEL_OUTOF10: 9
PAINLEVEL_OUTOF10: 0
PAINLEVEL_OUTOF10: 8
PAINLEVEL_OUTOF10: 9
PAINLEVEL_OUTOF10: 6
PAINLEVEL_OUTOF10: 3
PAINLEVEL_OUTOF10: 3

## 2018-09-19 ASSESSMENT — PAIN DESCRIPTION - PAIN TYPE
TYPE: ACUTE PAIN

## 2018-09-19 ASSESSMENT — PAIN DESCRIPTION - PROGRESSION: CLINICAL_PROGRESSION: GRADUALLY WORSENING

## 2018-09-19 ASSESSMENT — PAIN DESCRIPTION - LOCATION
LOCATION: LEG

## 2018-09-19 ASSESSMENT — PAIN DESCRIPTION - ONSET: ONSET: ON-GOING

## 2018-09-19 NOTE — PROGRESS NOTES
Restrictions/Precautions:  Fall Risk                    Other position/activity restrictions: R BKA with drainage from stump, don't use prosthesis until plan determined       Subjective:  Chart Reviewed: Yes  Patient assessed for rehabilitation services?: Yes  Family / Caregiver Present: No  Subjective: RN approved PT session. Patient sitting in bed upon PT arrival, agreeable to therapy with encouragement. General:  Overall Orientation Status: Within Normal Limits  Follows Commands: Within Functional Limits    Vision: Within Functional Limits    Hearing: Within functional limits         Pain:  Yes. Pain Assessment  Pain Level: 9  Pain Type: Acute pain  Pain Location: Leg  Pain Orientation: Right       Social/Functional History:    Lives With: Alone  Type of Home: Facility  Home Layout: One level  Home Access: Ramped entrance     Bathroom Shower/Tub: Tub/Shower unit  Bathroom Equipment: Shower chair       ADL Assistance: Independent  Homemaking Assistance: Independent  Ambulation Assistance: Independent  Transfer Assistance: Independent    Active : Yes     Additional Comments: Patient staying at [facility] prior to admit, which he states he has been there the past year and plans on going back there. He has a prosthesis in which he states that he wears all the time and does not otherwise use an assistive device for mobility.       Objective:  RLE AROM: WNL  LLE AROM : WNL      Strength RLE:  (strength grossly 4-/5)    Strength LLE:  (strength grossly 4/5)    Sensation  Overall Sensation Status: Impaired  Light Touch: Partial deficits in the LLE (pt reports constant numbness in his L leg)    RLE Tone: Normotonic  LLE Tone: Normotonic       Balance  Posture: Good  Sitting - Static: Good  Sitting - Dynamic: Good    Supine to Sit: Stand by assistance (with HOB raised + use of bed rail)    Transfers  Sit to Stand: Contact guard assistance (verbal cues for hand placement, and for upright posture once Training, Patient/Caregiver Education & Training    Goals:  Patient goals : Go home  Short term goals  Time Frame for Short term goals: 2 weeks  Short term goal 1: Patient will transfer supine <--> sit with mod I in order to get into/out of bed. Short term goal 2: Patient will transfer sit <--> stand with mod I in order to get up to ambulate. Short term goal 3: Patient will tolerate a gait assessment when able/agreeable. Long term goals  Time Frame for Long term goals : No LTGs due to short ELOS    Evaluation Complexity: Based on the findings of patient history, examination, clinical presentation, and decision making during this evaluation, the evaluation of Alec Tapia  is of medium complexity. PT G-Codes  Functional Limitation: Mobility: Walking and moving around  Mobility: Walking and Moving Around Current Status (): At least 40 percent but less than 60 percent impaired, limited or restricted  Mobility: Walking and Moving Around Goal Status ():  At least 1 percent but less than 20 percent impaired, limited or restricted       AM-PAC Inpatient Mobility without Stair Climbing Raw Score : 15  AM-PAC Inpatient without Stair Climbing T-Scale Score : 43.03  Mobility Inpatient CMS 0-100% Score: 47.43  Mobility Inpatient without Stair CMS G-Code Modifier : ANTONINA Sumner, PT, DPT

## 2018-09-19 NOTE — PROGRESS NOTES
Nutrition Assessment    Type and Reason for Visit: Initial, Positive Nutrition Screen, Consult, Patient Education (wound, diet education)    Nutrition Recommendations: Consider a multivitamin to assist with wound healing. Continue current diet and ONS. Malnutrition Assessment:  · Malnutrition Status: No malnutrition    Nutrition Diagnosis:   · Problem: Increased nutrient needs  · Etiology: related to Increased demand for energy/nutrients due to (wound healing)     Signs and symptoms:  as evidenced by Presence of wounds    Nutrition Assessment:  · Subjective Assessment: Pt seen, known to writer from previous admit 4/4/18, carb controlled, cardiac, weight reduction diet was reinforced with pt at that time. Pt seen today, HgbA1c 11% 9/18/18. He resides at [facility], he can choose the foods he wants to eat. Appetite good. Weight~ 220#. Medications: lantus, humalog, merrem, vancomycin, culturelle, remeron. He agreed to Pascual BID to assist with wound healing and yogurt TID for extra protein/probioitic benefit. Diet reinforced again today. · Wound Type:  (coccyx, right stump)  · Current Nutrition Therapies:  · Oral Diet Orders:  (Carb Control)   · Oral Diet intake: %  · Oral Nutrition Supplement (ONS) Orders: Wound Healing Supplement (Pascual BID and yogurt TID)  · ONS intake: Unable to assess (just initiated)  · Anthropometric Measures:  · Ht: 5' 6\" (167.6 cm)   · Current Body Wt: 235 lb (106.6 kg) (9/18/18 +2 RLE)  · Admission Body Wt: 235 lb (106.6 kg) (9/18/18 +2 RLE)  · Usual Body Wt:  (~ 220# per pt. Per EMR: 4/3/18: 220#1. 6oz)  · Ideal Body Wt: 134 lb (60.8 kg) (adjusted for BKA),   · BMI Classification: BMI > or equal to 40.0 Obese Class III (40 adjusted for BKA)  · Comparative Standards (Estimated Nutrition Needs):  · Estimated Daily Total Kcal: 1646-9258 kcals  · Estimated Daily Protein (g): 73 grams or more    Estimated Intake vs Estimated Needs: Intake Improving    Nutrition Risk Level: Moderate    Nutrition Interventions:   Continue current diet, Continue current ONS  Continued Inpatient Monitoring, Education Completed, Coordination of Care (Carb controlled, cardiac, weight reduction diet reinforced, handouts provided as well 9/19/18. )    Nutrition Evaluation:   · Evaluation: Goals set   · Goals: Pt will consume 75% of more of meals to assist with wound healing during LOS. · Monitoring: Meal Intake, Supplement Intake, Diet Tolerance, Wound Healing, Ascites/Edema, Weight, Pertinent Labs    See Adult Nutrition Doc Flowsheet for more detail.      Electronically signed by Hong Dominique RD, LD on 9/19/18 at 12:19 PM    Contact Number: [phone]

## 2018-09-19 NOTE — PROGRESS NOTES
[facility]  Daily Progress Note    Pt Name: Samuel Cristobal Record Number: [id number]  Date of Birth 1970   Today's Date: 9/19/2018    Hospital day # 1     ASSESSMENT   1. Right lower extremity BKA stump infected wounds  2. Uncontrolled diabetes mellitus  3. Morbid obesity (BMI 52)  4. Fevers  5. Sepsis  6. Tobacco dependence/abuse  7. History osteomyelitis   has a past medical history of Bipolar 2 disorder ([address] Diabetes mellitus type 2, uncontrolled ([address] Diabetic foot ulcer ([address] Diabetic polyneuropathy ([address] Diabetic ulcer of right foot associated with type 2 diabetes mellitus ([address] Essential hypertension; GERD (gastroesophageal reflux disease); Hammer toe of left foot; Heart murmur; History of tobacco abuse; Hx of BKA, right ([address] Macular edema, diabetic, bilateral ([address] Marijuana abuse in remission; Onychomycosis; and 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 PLAN   1. Diet as tolerated. NPO at midnight. 2. Hospitalist following. Okay to proceed with surgery from their standpoint. 3. IV hydration  4. Pain control with Ultram   5. Close monitoring of blood sugars   6. Nicotine patch   7. Antibiotic therapy. Following wound cultures. ID following. 8. Informed consent  9. Labs in am   10. Routine wound care  11. At this time infectious disease would like us to proceed with excisional debridement. Surgical intervention discussed with patient. Patient is okay with proceeding. Plan is tomorrow afternoon. Questions answered. SUBJECTIVE   Chief complaint: Right BKA stump pain     Patient was stable overnight. Chart reviewed. Fevers better. WBC better than yesterday. Updated by nursing staff. Denies chest discomfort or dyspnea. No N/V; (+) belching, flatus. Watery stools. Tolerating DIET CARB CONTROL;   Dietary Nutrition Supplements: Other Oral Supplement (see comment)  Dietary Nutrition Supplements: Wound Hospital Lab   Streptococcus species       RADIOLOGY     PROCEDURE: XR CHEST PORTABLE       CLINICAL INFORMATION: fever, .       COMPARISON: Multiple previous most recent 9/17/2018       TECHNIQUE: AP upright view of the chest.       FINDINGS: Heart size is normal. There is no definite infiltrate.               Impression   Unremarkable portable chest.               **This report has been created using voice recognition software. It may contain minor errors which are inherent in voice recognition technology. **       Final report electronically signed by Dr. Tonya Felix on 9/18/2018 1:20 PM     Electronically signed by MARILYN Jamil CNP on 9/19/2018 at 4:06 PM     Patient seen and examined independently by me earlier today. Above discussed and I agree with Brenna Gooden CNP. See my additional comments below for updated orders and plan. Labs, cultures, and radiographs where available were reviewed. I discussed patient concerns with the patient's nurse and instructions were given. Please see our orders for the updated patient care plan. - Nothing by mouth after midnight. Consent. Risks, benefits and alternatives discussed with patient in regards to I&D with debridement. All questions answered. He agrees to proceed with surgery. Plan on surgery tomorrow. Antibiotics per infectious disease.     Electronically signed by Kyaw Garcia MD on 9/19/2018 at 4:20 PM

## 2018-09-19 NOTE — FLOWSHEET NOTE
09/18/18 1597   Provider Notification   Reason for Communication New orders   Provider Name Dr. China Baldwin   Provider Notification Physician   Method of Communication Secure Message   Response See orders   Notification Time Richard Broad Dr. China Baldwin that patient has non-productive cough and is requesting some medication for it. Orders placed per Dr. China Baldwin.

## 2018-09-19 NOTE — PROGRESS NOTES
Barringtonchance Cherelle 58  INPATIENT OCCUPATIONAL THERAPY  [facility] ONC MED 5K  EVALUATION    Time:  Time In: 2442  Time Out: 1421  Timed Code Treatment Minutes: 15 Minutes  Minutes: 28          Date: 2018  Patient Name: Dena Ireland,   Gender: male      MRN: [id number]  : 1970  (46 y.o.)  Referring Practitioner: Dr. Samanta Miller  Diagnosis: sepsis  Additional Pertinent Hx: 45 y/o male admitted with sepsis secondary to right LE infected diabetic ulcer. No evidence for osteomyelitis. History of R BKA.      Restrictions/Precautions:  Fall Risk         Other position/activity restrictions: R BKA with drainage from stump, don't use prosthesis until plan determined       Past Medical History:   Diagnosis Date    Bipolar 2 disorder ([address]     previously followed with Dr. Sharon Fonseca and Joaquina Lepe in [facility] Diabetes mellitus type 2, uncontrolled ([address]     Diabetic foot ulcer ([address]     Diabetic polyneuropathy ([address]     Diabetic ulcer of right foot associated with type 2 diabetes mellitus ([address] 12/10/2015    Essential hypertension     \"never been on b/p medication that I know of\"    GERD (gastroesophageal reflux disease)     Hammer toe of left foot     Heart murmur     denies any chest pain or palpitations    History of tobacco abuse     Hx of BKA, right (HCC)     Macular edema, diabetic, bilateral ([address] 2018    Dr. Rajni Deal referred to retina specialist for 2nd opinion    Marijuana abuse in remission     Onychomycosis     WD-Skin ulcer of fourth toe of right foot with necrosis of bone ([address] 2016     Past Surgical History:   Procedure Laterality Date    ABSCESS DRAINAGE Right     foot    FOOT DEBRIDEMENT Right 2016    I & D    LEG AMPUTATION BELOW KNEE Right 2016    OTHER SURGICAL HISTORY Right 1/14/15    sole of foot I&D    SC DRAIN INFECT SHOULDER BURSA Left 2017    LEFT SHOULDER INCISION AND DRAINAGE performed by Kell Carver MD at [address] [place] [address] Recommendations:  Equipment Needed: Yes  Other: Pt would benefit from w/c & LH AE    Safety:  Safety Devices in place: Yes  Type of devices: All fall risk precautions in place, Gait belt, Call light within reach, Bed alarm in place, Nurse notified    Plan:  Times per week: 3-5x  Current Treatment Recommendations: Balance Training, Functional Mobility Training, Endurance Training, Safety Education & Training, Self-Care / ADL    Goals:  Patient goals : get back to PLOF    Short term goals  Time Frame for Short term goals: 2 weeks  Short term goal 1: Complete 2 min standing with CGA & alternating 1 UE support to increase balance for standing during toileting  Short term goal 2: Complete sit-pivot t/f to w/c & drop arm BSC with S & 0-2 vcs for safety  Short term goal 3: Complete BUE green theraband HEP with S & 0 vcs for technique to increase UB strength to wheel w/c  Short term goal 4: Complete LE dressing with min A & LH AE prn  Long term goals  Time Frame for Long term goals : No LTG set d/t short ELOS    Evaluation Complexity: Based on the findings of patient history, examination, clinical presentation, and decision making during this evaluation, this patient is of medium complexity. OT G-codes  Functional Limitation: Self care  Self Care Current Status (): At least 40 percent but less than 60 percent impaired, limited or restricted  Self Care Goal Status ():  At least 40 percent but less than 60 percent impaired, limited or restricted  AM-[facility] Inpatient Daily Activity Raw Score: 15  AM-PAC Inpatient ADL T-Scale Score : 34.69  ADL Inpatient CMS 0-100% Score: 56.46  ADL Inpatient CMS G-Code Modifier : ANTONINA

## 2018-09-19 NOTE — CARE COORDINATION
Kiki Winter to address. Note on chart. Smoking status:  reports that he has been smoking Cigars. He has a 65.00 pack-year smoking history. He has never used smokeless tobacco.   Influenza Vaccination Screening Completed: n/a  Pneumonia Vaccination Screening Completed: yes  PCP: No primary care provider on file. Readmission: No  Readmission Risk Score: 31%  Discharge Planning  Current Residence:  Private Residence  Living Arrangements:  Alone   Support Systems:  Family Members, [employer]  Current Services PTA:     Potential Assistance Needed:  Home Care  Potential Assistance Purchasing Medications:  No  Does patient want to participate in local refill/ meds to beds program?  Yes  Type of Home Care Services:  None  Patient expects to be discharged to:  home alone  Expected Discharge date:  09/25/18  Follow Up Appointment: Best Day/ Time: Tuesday AM  Discharge Plan: Met with Joceline Kee. He states he resides at [facility] with 13 other men. He plans to return there at discharge. Joceline Kee states he has no PCP and gets his prescriptions filled here at [facility]. He has been seen by Jonah Bean CNP in the past and has a hospital follow-up appointment scheduled for him at discharge with her. He would like to get into [facility]. He has been a \"no show\" in the past with one of the [facility] physicians and therefore the  states she will have the office call this CM to notify if they will accept patient to their practice or not. Joceline Kee states he has had [facility] in the past and he would like to have [facility] at discharge.     Evaluation: yes

## 2018-09-19 NOTE — PROGRESS NOTES
Progress note: Infectious diseases    Patient - Dennise Mendes,  Age - 46 y.o.    - 1970      Room Number - 6P-01/778-G   MRN -  [id number]   [facility]  Date of Admission -  2018 10:59 AM    SUBJECTIVE:   No new issues  Plan for surgery tomorrow  Has prulent drainage from the stump  OBJECTIVE   VITALS    height is 5' 6\" (1.676 m) and weight is 235 lb (106.6 kg). His oral temperature is 98.9 °F (37.2 °C). His blood pressure is 141/80 (abnormal) and his pulse is 106. His respiration is 16 and oxygen saturation is 96%.        Wt Readings from Last 3 Encounters:   18 235 lb (106.6 kg)   18 223 lb (101.2 kg)   18 224 lb (101.6 kg)       I/O (24 Hours)    Intake/Output Summary (Last 24 hours) at 18 1759  Last data filed at 18 1726   Gross per 24 hour   Intake         09100.32 ml   Output             2825 ml   Net          9542.32 ml       General Appearance  Awake, alert, oriented,  not  In acute distress  HEENT - normocephalic, atraumatic, pink conjunctiva,  anicteric sclera  Neck - Supple, no mass  Lungs -  Bilateral good air entry, no rhonchi, no wheeze  Cardiovascular - Heart sounds are normal.    Abdomen - soft, not distended, nontender,   Neurologic -oriented  Skin - No bruising or bleeding  Extremities - right BKA stump with open wound with prulent drainage    MEDICATIONS:      enoxaparin  40 mg Subcutaneous Daily    calcium-vitamin D  1 tablet Oral BID    mirtazapine  15 mg Oral Nightly    sertraline  150 mg Oral Daily    insulin lispro  20 Units Subcutaneous TID AC    insulin glargine  40 Units Subcutaneous BID    sodium chloride flush  10 mL Intravenous 2 times per day    meropenem  500 mg Intravenous Q8H    lactobacillus  1 capsule Oral Daily with breakfast    nicotine  1 patch Transdermal Q24H      dextrose      sodium chloride 100 mL/hr at 18 2499

## 2018-09-19 NOTE — CARE COORDINATION
DISCHARGE BARRIERS  9/19/18, 11:20 AM    Reason for Referral: will probably need wound/HH care set up  Mental Status: alert and oriented  Decision Making: patient is able to make his own decisions. Family/Social/Home Environment: Spoke with patient re: home situation and possible discharge needs. Patient is a 46year old,  male. He states that he has been a resident of [facility] for approximately 1 year. There are 13 other residents in the home. He states that home is 2 story with stairs to enter from the front and a ramp from the back. Patient has his own room which is located on the first floor. Residents share a bathroom (also located on the first floor). It has a tub/shower combination. Patient states that he is able to get in and out without difficulty and uses a shower chair when completing personal care. Patient states that there is a cook who prepares the evening meal for residents. They are on their own for the other 2 meals but, are able to have their own food items and use the kitchen facilities. He is responsible for keeping his own room clean and doing his own laundry. Patient states that he is able to drive and has his own vehicle. Patient admitted due to an infected diabetic ulcer on his right stump. He does not have a PCP and has been set up to be seen by ALEJANDRO Leon for a discharge follow up appointment. Current Services: none PTA  Current Equipment: shower chair  Payment Source: medicare and medicaid-patient states that he gets his prescriptions filled at [facility] and co-pays are minimal.   Concerns or Barriers to Discharge: none indicated  Collabrative List of ECF/HH were provided: N/A-patient has used [facility] in the past and states that his preference is to use them again. Teach Back Method used with patient regarding care plan   Patient verbalize understanding of the plan of care and contribute to goal setting.        Anticipated Needs/Discharge Plan: patient plans home at discharge. Will need home health for wound care needs and is agreeable.      Electronically signed by NEFTALI Kimbrough on 9/19/2018 at 11:20 AM

## 2018-09-19 NOTE — TELEPHONE ENCOUNTER
9/19/18 . Transition of Care visit scheduled.  9/25/18  ]Patient is being discharged to home from [facility]

## 2018-09-19 NOTE — PROGRESS NOTES
Hospitalist Progress Note    Patient:  Parveen Rodrigez      Unit/Bed:5K-17/017-A    YOB: 1970    MRN: [id number]       Acct: [de-identified]     PCP: No primary care provider on file. Date of Admission: 9/18/2018    Chief Complaint: right lower leg wound, fever    Patient presented to the emergency room with chief complaint of fever at home 102, and also has wound on the right stumpWith discharge that started on the day of presentation. Did not have any chest pain or shortness of breath and has been having pain in the stump 5/10. Patient was evaluated one a prior in the emergency room for elevated blood sugar In 400s and was started on insulin just a few weeks ago. Interestingly in the emergency room the wound was not noted  And 4 elevated d-dimer CT scan was done and was negative for PE.and the patient also stated that he did not notice discharge until the day of presentation but has been having worsening Pain though. Patient uses a prosthetic leg and walks. Admits to smoking Cigars. Patient had fever of 102.1, Heart rate 122, blood pressure 142/76, Labs showed sodium 1129, blood sugar 349, WBC 18,900,     Hospital Course:   Admitted to the hospital and started on IV fluids, antibiotics and x-ray of the right femurs shows soft tissue swelling distal to feet tibia fibula. Infectious disease was consulted and felt that the patient has Infected right BKA stump with abscess and general surgery is also on board for incision and drainage. IND is being planned for 9/20.   Patient currently having soft stools are not watery, will monitor        Subjective:  Having soft stools several, not watery, no abdominal pain      Medications:  Reviewed    Infusion Medications    dextrose      sodium chloride 100 mL/hr at 09/19/18 0123     Scheduled Medications    mirtazapine  15 mg Oral Nightly    sertraline  150 mg Oral Daily    insulin lispro  20 Units Subcutaneous TID AC    insulin glargine 129*  128*   K  4.3  4.5  3.7   CL  88*  93*  98   CO2  18*  20*  18*   BUN  18  9  7   CREATININE  0.9  0.7  0.7   CALCIUM  9.0  8.0*  7.4*     Recent Labs      09/17/18   1159  09/18/18   1205   AST  9  18   ALT  7*  <5*   BILIDIR   --   <0.2   BILITOT  0.7  0.9   ALKPHOS  87  88     No results for input(s): INR in the last 72 hours. No results for input(s): Dena Sneddon in the last 72 hours. Urinalysis:    Lab Results   Component Value Date    NITRU NEGATIVE 09/18/2018    WBCUA 50-75 09/18/2018    BACTERIA NONE 09/18/2018    RBCUA 0-2 09/18/2018    BLOODU TRACE 09/18/2018    SPECGRAV >1.030 09/18/2018    GLUCOSEU >= 1000 09/17/2018       Radiology:  XR CHEST PORTABLE   Final Result   Unremarkable portable chest.            **This report has been created using voice recognition software. It may contain minor errors which are inherent in voice recognition technology. **      Final report electronically signed by Dr. Elo Smith on 9/18/2018 1:20 PM      XR FEMUR RIGHT (MIN 2 VIEWS)   Final Result   Possible soft tissue swelling distal tibia fibula/scalp. No standard radiographic evidence of active osteomyelitis            **This report has been created using voice recognition software. It may contain minor errors which are inherent in voice recognition technology. **      Final report electronically signed by Dr. Timothy Vasquez on 9/18/2018 12:20 PM      XR TIBIA FIBULA RIGHT (2 VIEWS)   Final Result   Possible soft tissue swelling distal tibia fibula/scalp. No standard radiographic evidence of active osteomyelitis            **This report has been created using voice recognition software. It may contain minor errors which are inherent in voice recognition technology. **      Final report electronically signed by Dr. Timothy Vasquez on 9/18/2018 12:20 PM          Diet: DIET CARB CONTROL;    DVT prophylaxis: [x] Lovenox                                 [] SCDs                                 [] SQ Heparin

## 2018-09-19 NOTE — TELEPHONE ENCOUNTER
Per office policy we do not accept patients that have been discharged for new patient no show. Please approve or deny.

## 2018-09-19 NOTE — FLOWSHEET NOTE
Report handed off to Gavin Green Pt in bed watching tv, with call light within reach.     Electronically signed by Kathy Senior on 9/19/2018 at 2:19 PM

## 2018-09-20 ENCOUNTER — ANESTHESIA EVENT (OUTPATIENT)
Dept: OPERATING ROOM | Age: 48
DRG: 854 | End: 2018-09-20
Payer: MEDICARE

## 2018-09-20 ENCOUNTER — ANESTHESIA (OUTPATIENT)
Dept: OPERATING ROOM | Age: 48
DRG: 854 | End: 2018-09-20
Payer: MEDICARE

## 2018-09-20 VITALS
TEMPERATURE: 100.4 F | SYSTOLIC BLOOD PRESSURE: 105 MMHG | OXYGEN SATURATION: 96 % | DIASTOLIC BLOOD PRESSURE: 65 MMHG | RESPIRATION RATE: 4 BRPM

## 2018-09-20 LAB
AEROBIC CULTURE: ABNORMAL
ANION GAP SERPL CALCULATED.3IONS-SCNC: 16 MEQ/L (ref 8–16)
BUN BLDV-MCNC: 7 MG/DL (ref 7–22)
CALCIUM SERPL-MCNC: 8.6 MG/DL (ref 8.5–10.5)
CHLORIDE BLD-SCNC: 102 MEQ/L (ref 98–111)
CO2: 19 MEQ/L (ref 23–33)
CREAT SERPL-MCNC: 0.6 MG/DL (ref 0.4–1.2)
ERYTHROCYTE [DISTWIDTH] IN BLOOD BY AUTOMATED COUNT: 14 % (ref 11.5–14.5)
ERYTHROCYTE [DISTWIDTH] IN BLOOD BY AUTOMATED COUNT: 47 FL (ref 35–45)
GFR SERPL CREATININE-BSD FRML MDRD: > 90 ML/MIN/1.73M2
GLUCOSE BLD-MCNC: 216 MG/DL (ref 70–108)
GLUCOSE BLD-MCNC: 220 MG/DL (ref 70–108)
GLUCOSE BLD-MCNC: 232 MG/DL (ref 70–108)
GLUCOSE BLD-MCNC: 234 MG/DL (ref 70–108)
GLUCOSE BLD-MCNC: 271 MG/DL (ref 70–108)
GLUCOSE BLD-MCNC: 333 MG/DL (ref 70–108)
GRAM STAIN RESULT: ABNORMAL
HCT VFR BLD CALC: 38.9 % (ref 42–52)
HEMOGLOBIN: 12.6 GM/DL (ref 14–18)
MAGNESIUM: 2.2 MG/DL (ref 1.6–2.4)
MCH RBC QN AUTO: 29.8 PG (ref 26–33)
MCHC RBC AUTO-ENTMCNC: 32.4 GM/DL (ref 32.2–35.5)
MCV RBC AUTO: 92 FL (ref 80–94)
ORGANISM: ABNORMAL
PLATELET # BLD: 233 THOU/MM3 (ref 130–400)
PMV BLD AUTO: 10.2 FL (ref 9.4–12.4)
POTASSIUM SERPL-SCNC: 3.8 MEQ/L (ref 3.5–5.2)
RBC # BLD: 4.23 MILL/MM3 (ref 4.7–6.1)
SODIUM BLD-SCNC: 137 MEQ/L (ref 135–145)
WBC # BLD: 14.7 THOU/MM3 (ref 4.8–10.8)

## 2018-09-20 PROCEDURE — 7100000000 HC PACU RECOVERY - FIRST 15 MIN: Performed by: SURGERY

## 2018-09-20 PROCEDURE — 99232 SBSQ HOSP IP/OBS MODERATE 35: CPT | Performed by: INTERNAL MEDICINE

## 2018-09-20 PROCEDURE — 97110 THERAPEUTIC EXERCISES: CPT

## 2018-09-20 PROCEDURE — 87070 CULTURE OTHR SPECIMN AEROBIC: CPT

## 2018-09-20 PROCEDURE — 87186 SC STD MICRODIL/AGAR DIL: CPT

## 2018-09-20 PROCEDURE — 6370000000 HC RX 637 (ALT 250 FOR IP): Performed by: INTERNAL MEDICINE

## 2018-09-20 PROCEDURE — 3600000013 HC SURGERY LEVEL 3 ADDTL 15MIN: Performed by: SURGERY

## 2018-09-20 PROCEDURE — 87075 CULTR BACTERIA EXCEPT BLOOD: CPT

## 2018-09-20 PROCEDURE — 99231 SBSQ HOSP IP/OBS SF/LOW 25: CPT | Performed by: SURGERY

## 2018-09-20 PROCEDURE — 3700000000 HC ANESTHESIA ATTENDED CARE: Performed by: SURGERY

## 2018-09-20 PROCEDURE — 2580000003 HC RX 258: Performed by: INTERNAL MEDICINE

## 2018-09-20 PROCEDURE — 6360000002 HC RX W HCPCS: Performed by: INTERNAL MEDICINE

## 2018-09-20 PROCEDURE — 83735 ASSAY OF MAGNESIUM: CPT

## 2018-09-20 PROCEDURE — 2709999900 HC NON-CHARGEABLE SUPPLY

## 2018-09-20 PROCEDURE — 6360000002 HC RX W HCPCS

## 2018-09-20 PROCEDURE — 2500000003 HC RX 250 WO HCPCS

## 2018-09-20 PROCEDURE — 36415 COLL VENOUS BLD VENIPUNCTURE: CPT

## 2018-09-20 PROCEDURE — 6370000000 HC RX 637 (ALT 250 FOR IP)

## 2018-09-20 PROCEDURE — 1200000000 HC SEMI PRIVATE

## 2018-09-20 PROCEDURE — 3700000001 HC ADD 15 MINUTES (ANESTHESIA): Performed by: SURGERY

## 2018-09-20 PROCEDURE — 0KBT0ZZ EXCISION OF LEFT LOWER LEG MUSCLE, OPEN APPROACH: ICD-10-PCS | Performed by: SURGERY

## 2018-09-20 PROCEDURE — 85027 COMPLETE CBC AUTOMATED: CPT

## 2018-09-20 PROCEDURE — 2709999900 HC NON-CHARGEABLE SUPPLY: Performed by: SURGERY

## 2018-09-20 PROCEDURE — 82948 REAGENT STRIP/BLOOD GLUCOSE: CPT

## 2018-09-20 PROCEDURE — 6360000002 HC RX W HCPCS: Performed by: ANESTHESIOLOGY

## 2018-09-20 PROCEDURE — 3600000003 HC SURGERY LEVEL 3 BASE: Performed by: SURGERY

## 2018-09-20 PROCEDURE — 87077 CULTURE AEROBIC IDENTIFY: CPT

## 2018-09-20 PROCEDURE — 11043 DBRDMT MUSC&/FSCA 1ST 20/<: CPT | Performed by: SURGERY

## 2018-09-20 PROCEDURE — 7100000001 HC PACU RECOVERY - ADDTL 15 MIN: Performed by: SURGERY

## 2018-09-20 PROCEDURE — APPSS30 APP SPLIT SHARED TIME 16-30 MINUTES: Performed by: NURSE PRACTITIONER

## 2018-09-20 PROCEDURE — 99024 POSTOP FOLLOW-UP VISIT: CPT | Performed by: NURSE PRACTITIONER

## 2018-09-20 PROCEDURE — 87205 SMEAR GRAM STAIN: CPT

## 2018-09-20 PROCEDURE — 97530 THERAPEUTIC ACTIVITIES: CPT

## 2018-09-20 PROCEDURE — 2580000003 HC RX 258

## 2018-09-20 PROCEDURE — 80048 BASIC METABOLIC PNL TOTAL CA: CPT

## 2018-09-20 RX ORDER — ROCURONIUM BROMIDE 10 MG/ML
INJECTION, SOLUTION INTRAVENOUS PRN
Status: DISCONTINUED | OUTPATIENT
Start: 2018-09-20 | End: 2018-09-20 | Stop reason: SDUPTHER

## 2018-09-20 RX ORDER — FENTANYL CITRATE 50 UG/ML
50 INJECTION, SOLUTION INTRAMUSCULAR; INTRAVENOUS EVERY 5 MIN PRN
Status: DISCONTINUED | OUTPATIENT
Start: 2018-09-20 | End: 2018-09-20 | Stop reason: HOSPADM

## 2018-09-20 RX ORDER — LIDOCAINE HYDROCHLORIDE 20 MG/ML
INJECTION, SOLUTION EPIDURAL; INFILTRATION; INTRACAUDAL; PERINEURAL PRN
Status: DISCONTINUED | OUTPATIENT
Start: 2018-09-20 | End: 2018-09-20 | Stop reason: SDUPTHER

## 2018-09-20 RX ORDER — HYDROCODONE BITARTRATE AND ACETAMINOPHEN 5; 325 MG/1; MG/1
2 TABLET ORAL EVERY 4 HOURS PRN
Status: DISCONTINUED | OUTPATIENT
Start: 2018-09-20 | End: 2018-09-21 | Stop reason: HOSPADM

## 2018-09-20 RX ORDER — ONDANSETRON 2 MG/ML
4 INJECTION INTRAMUSCULAR; INTRAVENOUS
Status: DISCONTINUED | OUTPATIENT
Start: 2018-09-20 | End: 2018-09-20 | Stop reason: HOSPADM

## 2018-09-20 RX ORDER — PROPOFOL 10 MG/ML
INJECTION, EMULSION INTRAVENOUS PRN
Status: DISCONTINUED | OUTPATIENT
Start: 2018-09-20 | End: 2018-09-20 | Stop reason: SDUPTHER

## 2018-09-20 RX ORDER — IPRATROPIUM BROMIDE AND ALBUTEROL SULFATE 2.5; .5 MG/3ML; MG/3ML
1 SOLUTION RESPIRATORY (INHALATION) ONCE
Status: COMPLETED | OUTPATIENT
Start: 2018-09-20 | End: 2018-09-20

## 2018-09-20 RX ORDER — SODIUM CHLORIDE, SODIUM LACTATE, POTASSIUM CHLORIDE, CALCIUM CHLORIDE 600; 310; 30; 20 MG/100ML; MG/100ML; MG/100ML; MG/100ML
INJECTION, SOLUTION INTRAVENOUS CONTINUOUS PRN
Status: DISCONTINUED | OUTPATIENT
Start: 2018-09-20 | End: 2018-09-20 | Stop reason: SDUPTHER

## 2018-09-20 RX ORDER — MEPERIDINE HYDROCHLORIDE 25 MG/ML
12.5 INJECTION INTRAMUSCULAR; INTRAVENOUS; SUBCUTANEOUS EVERY 5 MIN PRN
Status: DISCONTINUED | OUTPATIENT
Start: 2018-09-20 | End: 2018-09-20 | Stop reason: HOSPADM

## 2018-09-20 RX ORDER — INSULIN GLARGINE 100 [IU]/ML
25 INJECTION, SOLUTION SUBCUTANEOUS ONCE
Status: DISCONTINUED | OUTPATIENT
Start: 2018-09-20 | End: 2018-09-21

## 2018-09-20 RX ORDER — NEOSTIGMINE METHYLSULFATE 5 MG/5 ML
SYRINGE (ML) INTRAVENOUS PRN
Status: DISCONTINUED | OUTPATIENT
Start: 2018-09-20 | End: 2018-09-20 | Stop reason: SDUPTHER

## 2018-09-20 RX ORDER — HYDROCODONE BITARTRATE AND ACETAMINOPHEN 5; 325 MG/1; MG/1
1 TABLET ORAL EVERY 4 HOURS PRN
Status: DISCONTINUED | OUTPATIENT
Start: 2018-09-20 | End: 2018-09-21 | Stop reason: HOSPADM

## 2018-09-20 RX ORDER — ONDANSETRON 2 MG/ML
INJECTION INTRAMUSCULAR; INTRAVENOUS PRN
Status: DISCONTINUED | OUTPATIENT
Start: 2018-09-20 | End: 2018-09-20 | Stop reason: SDUPTHER

## 2018-09-20 RX ORDER — MORPHINE SULFATE 4 MG/ML
4 INJECTION, SOLUTION INTRAMUSCULAR; INTRAVENOUS
Status: DISCONTINUED | OUTPATIENT
Start: 2018-09-20 | End: 2018-09-21 | Stop reason: HOSPADM

## 2018-09-20 RX ORDER — MIDAZOLAM HYDROCHLORIDE 1 MG/ML
INJECTION INTRAMUSCULAR; INTRAVENOUS PRN
Status: DISCONTINUED | OUTPATIENT
Start: 2018-09-20 | End: 2018-09-20 | Stop reason: SDUPTHER

## 2018-09-20 RX ORDER — DEXAMETHASONE SODIUM PHOSPHATE 4 MG/ML
INJECTION, SOLUTION INTRA-ARTICULAR; INTRALESIONAL; INTRAMUSCULAR; INTRAVENOUS; SOFT TISSUE PRN
Status: DISCONTINUED | OUTPATIENT
Start: 2018-09-20 | End: 2018-09-20 | Stop reason: SDUPTHER

## 2018-09-20 RX ORDER — FENTANYL CITRATE 50 UG/ML
INJECTION, SOLUTION INTRAMUSCULAR; INTRAVENOUS PRN
Status: DISCONTINUED | OUTPATIENT
Start: 2018-09-20 | End: 2018-09-20 | Stop reason: SDUPTHER

## 2018-09-20 RX ORDER — MORPHINE SULFATE 2 MG/ML
2 INJECTION, SOLUTION INTRAMUSCULAR; INTRAVENOUS
Status: DISCONTINUED | OUTPATIENT
Start: 2018-09-20 | End: 2018-09-21 | Stop reason: HOSPADM

## 2018-09-20 RX ORDER — IPRATROPIUM BROMIDE AND ALBUTEROL SULFATE 2.5; .5 MG/3ML; MG/3ML
SOLUTION RESPIRATORY (INHALATION)
Status: COMPLETED
Start: 2018-09-20 | End: 2018-09-20

## 2018-09-20 RX ORDER — LABETALOL HYDROCHLORIDE 5 MG/ML
5 INJECTION, SOLUTION INTRAVENOUS EVERY 10 MIN PRN
Status: DISCONTINUED | OUTPATIENT
Start: 2018-09-20 | End: 2018-09-20 | Stop reason: HOSPADM

## 2018-09-20 RX ORDER — INSULIN GLARGINE 100 [IU]/ML
50 INJECTION, SOLUTION SUBCUTANEOUS 2 TIMES DAILY
Status: DISCONTINUED | OUTPATIENT
Start: 2018-09-20 | End: 2018-09-20

## 2018-09-20 RX ORDER — GLYCOPYRROLATE 1 MG/5 ML
SYRINGE (ML) INTRAVENOUS PRN
Status: DISCONTINUED | OUTPATIENT
Start: 2018-09-20 | End: 2018-09-20 | Stop reason: SDUPTHER

## 2018-09-20 RX ORDER — INSULIN GLARGINE 100 [IU]/ML
50 INJECTION, SOLUTION SUBCUTANEOUS 2 TIMES DAILY
Status: DISCONTINUED | OUTPATIENT
Start: 2018-09-20 | End: 2018-09-21 | Stop reason: HOSPADM

## 2018-09-20 RX ADMIN — PHENYLEPHRINE HYDROCHLORIDE 200 MCG: 10 INJECTION INTRAVENOUS at 13:27

## 2018-09-20 RX ADMIN — FENTANYL CITRATE 50 MCG: 50 INJECTION INTRAMUSCULAR; INTRAVENOUS at 13:30

## 2018-09-20 RX ADMIN — DEXAMETHASONE SODIUM PHOSPHATE 4 MG: 4 INJECTION, SOLUTION INTRAMUSCULAR; INTRAVENOUS at 13:25

## 2018-09-20 RX ADMIN — MIDAZOLAM HYDROCHLORIDE 2 MG: 1 INJECTION, SOLUTION INTRAMUSCULAR; INTRAVENOUS at 13:00

## 2018-09-20 RX ADMIN — PROPOFOL 200 MG: 10 INJECTION, EMULSION INTRAVENOUS at 13:11

## 2018-09-20 RX ADMIN — CALCIUM CARBONATE-VITAMIN D TAB 500 MG-200 UNIT 1 TABLET: 500-200 TAB at 21:36

## 2018-09-20 RX ADMIN — SODIUM CHLORIDE, POTASSIUM CHLORIDE, SODIUM LACTATE AND CALCIUM CHLORIDE: 600; 310; 30; 20 INJECTION, SOLUTION INTRAVENOUS at 13:04

## 2018-09-20 RX ADMIN — METOPROLOL TARTRATE 12.5 MG: 25 TABLET ORAL at 21:36

## 2018-09-20 RX ADMIN — MEROPENEM 500 MG: 1 INJECTION, POWDER, FOR SOLUTION INTRAVENOUS at 00:38

## 2018-09-20 RX ADMIN — SODIUM CHLORIDE: 9 INJECTION, SOLUTION INTRAVENOUS at 03:58

## 2018-09-20 RX ADMIN — FENTANYL CITRATE 50 MCG: 50 INJECTION INTRAMUSCULAR; INTRAVENOUS at 14:28

## 2018-09-20 RX ADMIN — SODIUM CHLORIDE: 9 INJECTION, SOLUTION INTRAVENOUS at 21:37

## 2018-09-20 RX ADMIN — FENTANYL CITRATE 50 MCG: 50 INJECTION INTRAMUSCULAR; INTRAVENOUS at 14:23

## 2018-09-20 RX ADMIN — TRAMADOL HYDROCHLORIDE 50 MG: 50 TABLET, FILM COATED ORAL at 10:35

## 2018-09-20 RX ADMIN — INSULIN LISPRO 3 UNITS: 100 INJECTION, SOLUTION INTRAVENOUS; SUBCUTANEOUS at 17:26

## 2018-09-20 RX ADMIN — ROCURONIUM BROMIDE 30 MG: 10 INJECTION INTRAVENOUS at 13:10

## 2018-09-20 RX ADMIN — IPRATROPIUM BROMIDE AND ALBUTEROL SULFATE 1 AMPULE: 2.5; .5 SOLUTION RESPIRATORY (INHALATION) at 13:50

## 2018-09-20 RX ADMIN — FENTANYL CITRATE 100 MCG: 50 INJECTION INTRAMUSCULAR; INTRAVENOUS at 13:23

## 2018-09-20 RX ADMIN — IPRATROPIUM BROMIDE AND ALBUTEROL SULFATE 1 AMPULE: .5; 3 SOLUTION RESPIRATORY (INHALATION) at 13:50

## 2018-09-20 RX ADMIN — Medication 0.8 MG: at 13:36

## 2018-09-20 RX ADMIN — ONDANSETRON HYDROCHLORIDE 4 MG: 4 INJECTION, SOLUTION INTRAMUSCULAR; INTRAVENOUS at 13:10

## 2018-09-20 RX ADMIN — LIDOCAINE HYDROCHLORIDE 80 MG: 20 INJECTION, SOLUTION EPIDURAL; INFILTRATION; INTRACAUDAL; PERINEURAL at 13:05

## 2018-09-20 RX ADMIN — Medication 5 MG: at 13:36

## 2018-09-20 RX ADMIN — INSULIN GLARGINE 50 UNITS: 100 INJECTION, SOLUTION SUBCUTANEOUS at 10:37

## 2018-09-20 RX ADMIN — MEROPENEM 500 MG: 1 INJECTION, POWDER, FOR SOLUTION INTRAVENOUS at 17:54

## 2018-09-20 RX ADMIN — METOPROLOL TARTRATE 12.5 MG: 25 TABLET ORAL at 08:42

## 2018-09-20 RX ADMIN — FENTANYL CITRATE 50 MCG: 50 INJECTION INTRAMUSCULAR; INTRAVENOUS at 14:37

## 2018-09-20 RX ADMIN — INSULIN GLARGINE 50 UNITS: 100 INJECTION, SOLUTION SUBCUTANEOUS at 21:36

## 2018-09-20 RX ADMIN — MEROPENEM 500 MG: 1 INJECTION, POWDER, FOR SOLUTION INTRAVENOUS at 10:05

## 2018-09-20 RX ADMIN — FENTANYL CITRATE 100 MCG: 50 INJECTION INTRAMUSCULAR; INTRAVENOUS at 13:00

## 2018-09-20 RX ADMIN — MIRTAZAPINE 15 MG: 15 TABLET, FILM COATED ORAL at 21:36

## 2018-09-20 ASSESSMENT — PULMONARY FUNCTION TESTS
PIF_VALUE: 32
PIF_VALUE: 28
PIF_VALUE: 4
PIF_VALUE: 28
PIF_VALUE: 28
PIF_VALUE: 27
PIF_VALUE: 28
PIF_VALUE: 24
PIF_VALUE: 27
PIF_VALUE: 1
PIF_VALUE: 28
PIF_VALUE: 43
PIF_VALUE: 2
PIF_VALUE: 27
PIF_VALUE: 17
PIF_VALUE: 27
PIF_VALUE: 8
PIF_VALUE: 0
PIF_VALUE: 2
PIF_VALUE: 27
PIF_VALUE: 27
PIF_VALUE: 2
PIF_VALUE: 2
PIF_VALUE: 28
PIF_VALUE: 29
PIF_VALUE: 27
PIF_VALUE: 28
PIF_VALUE: 28
PIF_VALUE: 3
PIF_VALUE: 27
PIF_VALUE: 27
PIF_VALUE: 28
PIF_VALUE: 35
PIF_VALUE: 29
PIF_VALUE: 28
PIF_VALUE: 28
PIF_VALUE: 25
PIF_VALUE: 4
PIF_VALUE: 27

## 2018-09-20 ASSESSMENT — PAIN DESCRIPTION - PROGRESSION
CLINICAL_PROGRESSION: GRADUALLY IMPROVING
CLINICAL_PROGRESSION: NOT CHANGED
CLINICAL_PROGRESSION: NOT CHANGED

## 2018-09-20 ASSESSMENT — PAIN DESCRIPTION - LOCATION
LOCATION: LEG
LOCATION: LEG

## 2018-09-20 ASSESSMENT — PAIN SCALES - GENERAL
PAINLEVEL_OUTOF10: 10
PAINLEVEL_OUTOF10: 0
PAINLEVEL_OUTOF10: 9
PAINLEVEL_OUTOF10: 0
PAINLEVEL_OUTOF10: 3
PAINLEVEL_OUTOF10: 0
PAINLEVEL_OUTOF10: 5
PAINLEVEL_OUTOF10: 9
PAINLEVEL_OUTOF10: 0
PAINLEVEL_OUTOF10: 9
PAINLEVEL_OUTOF10: 10
PAINLEVEL_OUTOF10: 6
PAINLEVEL_OUTOF10: 10
PAINLEVEL_OUTOF10: 3

## 2018-09-20 ASSESSMENT — PAIN DESCRIPTION - PAIN TYPE
TYPE: SURGICAL PAIN
TYPE: ACUTE PAIN

## 2018-09-20 ASSESSMENT — PAIN DESCRIPTION - ONSET
ONSET: ON-GOING

## 2018-09-20 ASSESSMENT — PAIN DESCRIPTION - ORIENTATION
ORIENTATION: RIGHT
ORIENTATION: RIGHT;LOWER

## 2018-09-20 ASSESSMENT — PAIN DESCRIPTION - DESCRIPTORS: DESCRIPTORS: ACHING

## 2018-09-20 ASSESSMENT — PAIN DESCRIPTION - FREQUENCY: FREQUENCY: INTERMITTENT

## 2018-09-20 NOTE — PROGRESS NOTES
capsule Oral Daily with breakfast    nicotine  1 patch Transdermal Q24H      dextrose      sodium chloride 100 mL/hr at 09/20/18 0358     glucose, dextrose, glucagon (rDNA), dextrose, sodium chloride flush, magnesium hydroxide, ondansetron, potassium chloride **OR** potassium chloride **OR** potassium chloride, potassium chloride, magnesium sulfate, acetaminophen, traMADol, benzonatate      LABS:     CBC:   Recent Labs      09/18/18   1205  09/19/18   0340  09/20/18   0548   WBC  18.9*  17.4*  14.7*   HGB  13.3*  13.0*  12.6*   PLT  208  207  233     BMP:    Recent Labs      09/17/18   1159  09/18/18   1205  09/19/18   0340   NA  127*  129*  128*   K  4.3  4.5  3.7   CL  88*  93*  98   CO2  18*  20*  18*   BUN  18  9  7   CREATININE  0.9  0.7  0.7   GLUCOSE  653*  349*  246*     Calcium:  Recent Labs      09/19/18   0340   CALCIUM  7.4*     Ionized Calcium:No results for input(s): IONCA in the last 72 hours. Magnesium:No results for input(s): MG in the last 72 hours. Phosphorus:No results for input(s): PHOS in the last 72 hours. BNP:No results for input(s): BNP in the last 72 hours. Glucose:  Recent Labs      09/19/18   1621  09/19/18   2000  09/20/18   0740   POCGLU  294*  238*  232*     HgbA1C:   Recent Labs      09/18/18   1741   LABA1C  11.0*     INR: No results for input(s): INR in the last 72 hours. Hepatic:   Recent Labs      09/17/18   1159  09/18/18   1205   ALKPHOS  87  88   ALT  7*  <5*   AST  9  18   PROT  8.1*  6.6   BILITOT  0.7  0.9   BILIDIR   --   <0.2   LABALBU  3.7  3.2*     Amylase and Lipase:  Recent Labs      09/19/18   0340   LACTA  1.4     Lactic Acid:   Recent Labs      09/19/18   0340   LACTA  1.4     Troponin: No results for input(s): CKTOTAL, CKMB, TROPONINI in the last 72 hours. BNP: No results for input(s): BNP in the last 72 hours.     CULTURES:   UA:   Recent Labs      09/17/18   1158  09/18/18   1713   SPECGRAV   --   >1.030*   PHUR  5.0  5.5   COLORU  YELLOW  YELLOW

## 2018-09-20 NOTE — PROGRESS NOTES
[facility]  Daily Progress Note    Pt Name: Deejay Noble Record Number: [id number]  Date of Birth 1970   Today's Date: 9/20/2018    Hospital day # 2     ASSESSMENT   1. Right lower extremity BKA stump infected wounds  2. Wound with streptococcus species   3. Uncontrolled diabetes mellitus  4. Morbid obesity (BMI 52)  5. Fevers  6. Sepsis  7. Tobacco dependence/abuse  8. History osteomyelitis   has a past medical history of Bipolar 2 disorder ([address] Diabetes mellitus type 2, uncontrolled ([address] Diabetic foot ulcer ([address] Diabetic polyneuropathy ([address] Diabetic ulcer of right foot associated with type 2 diabetes mellitus ([address] Essential hypertension; GERD (gastroesophageal reflux disease); Hammer toe of left foot; Heart murmur; History of tobacco abuse; Hx of BKA, right ([address] Macular edema, diabetic, bilateral ([address] Marijuana abuse in remission; Onychomycosis; and 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 PLAN   1. NPO for surgery  2. Hospitalist following. Okay to proceed with surgery from their standpoint. 3. IV hydration  4. Pain control with Ultram   5. Close monitoring of blood sugars   6. Nicotine patch   7. DVT prophylaxis - hold Lovenox today   8. Antibiotic therapy. Following wound cultures. ID following. 9. Informed consent  10. Labs in process  11. Routine wound care  12. Plan for excisional debridement this afternoon. Hospitalist and ID on board. No questions. Patient would like to proceed with surgery. SUBJECTIVE   Chief complaint: Right BKA stump pain     Patient was stable overnight. Chart reviewed. Fevers off and on. WBC in process. Updated by nursing staff. Denies chest discomfort or dyspnea. No N/V; (+) belching, flatus. Watery stools.  Tolerating Dietary Nutrition Supplements: Other Oral Supplement (see comment)  Dietary Nutrition Supplements: Wound Healing Oral Supplement  Diet NPO, After pairs and chains. Organism  (Abnormal) 09/18/2018  6:50  [facility]   Streptococcus species       RADIOLOGY   No new imaging    Electronically signed by MARILYN Smyth - CNP on 9/20/2018 at 8:13 AM     Patient seen and examined independently by me earlier this AM. Above discussed and I agree with Isabell Pino CNP. See my additional comments below for updated orders and plan. Labs, cultures, and radiographs where available were reviewed. I discussed patient concerns with the patient's nurse and instructions were given. Please see our orders for the updated patient care plan. - Planning I&D with possible debridement later today. Nothing by mouth. Consent. Risks, benefits and alternatives discussed and patient. He agrees to proceed. Antibiotic per infectious disease.     Electronically signed by Marli Troncoso MD on 9/20/2018 at 10:28 AM

## 2018-09-20 NOTE — PROGRESS NOTES
organomegaly, BS+  Neurological - alert, oriented, normal speech, sensations intact and able to move all extremities , right BKA- bandage present- pictures by wound care  Extremities - peripheral pulses normal, no pedal edema,  Capillary refill less than 3 sec  Skin - normal coloration and turgor, no rashes        Labs:   Recent Labs      09/18/18   1205  09/19/18   0340  09/20/18   0548   WBC  18.9*  17.4*  14.7*   HGB  13.3*  13.0*  12.6*   HCT  37.9*  37.0*  38.9*   PLT  208  207  233     Recent Labs      09/18/18   1205  09/19/18   0340  09/20/18   0945   NA  129*  128*  137   K  4.5  3.7  3.8   CL  93*  98  102   CO2  20*  18*  19*   BUN  9  7  7   CREATININE  0.7  0.7  0.6   CALCIUM  8.0*  7.4*  8.6     Recent Labs      09/18/18   1205   AST  18   ALT  <5*   BILIDIR  <0.2   BILITOT  0.9   ALKPHOS  88     No results for input(s): INR in the last 72 hours. No results for input(s): [place] in the last 72 hours. Urinalysis:      Lab Results   Component Value Date    NITRU NEGATIVE 09/18/2018    WBCUA 50-75 09/18/2018    BACTERIA NONE 09/18/2018    RBCUA 0-2 09/18/2018    BLOODU TRACE 09/18/2018    SPECGRAV >1.030 09/18/2018    GLUCOSEU >= 1000 09/17/2018       Radiology:  XR CHEST PORTABLE   Final Result   Unremarkable portable chest.            **This report has been created using voice recognition software. It may contain minor errors which are inherent in voice recognition technology. **      Final report electronically signed by Dr. Tonya Felix on 9/18/2018 1:20 PM      XR FEMUR RIGHT (MIN 2 VIEWS)   Final Result   Possible soft tissue swelling distal tibia fibula/scalp. No standard radiographic evidence of active osteomyelitis            **This report has been created using voice recognition software. It may contain minor errors which are inherent in voice recognition technology. **      Final report electronically signed by Dr. Stanford Jordan on 9/18/2018 12:20 PM      XR TIBIA FIBULA RIGHT (2 VIEWS)   Final Result   Possible soft tissue swelling distal tibia fibula/scalp. No standard radiographic evidence of active osteomyelitis            **This report has been created using voice recognition software. It may contain minor errors which are inherent in voice recognition technology. **      Final report electronically signed by Dr. Stanford Jordan on 9/18/2018 12:20 PM          Diet: Dietary Nutrition Supplements: Other Oral Supplement (see comment)  Dietary Nutrition Supplements: Wound Healing Oral Supplement  Diet NPO, After Midnight    DVT prophylaxis: [x] Lovenox                                 [] SCDs                                 [] SQ Heparin                                 [] Encourage ambulation           [] Already on Anticoagulation     Disposition:    [] Home       [] TCU       [] Rehab       [] Psych       [x] SNF       [] [place]       [] Other-    Code Status: Full Code    PT/OT Eval Status:     Assessment/Plan:    Anticipated Discharge in : ongoing       Sepsis secondary to infected amputation stump with abscess with necrotic wound: Continue antibiotics vancomycin and meropenem, infectious disease and general surgery  Did I&D right lower extremity BKA stump abscess with excisional debridement skin, subcutaneous tissue & muscle/fascia necrotic wound (3x4 cm)      Diabetes mellitus type 2, uncontrolled  With hyperglycemia, insulin-dependent, Peripheral neuropathy  Lab Results   Component Value Date    LABA1C 11.0 (H) 09/18/2018     Lab Results   Component Value Date     06/30/2016     increase Lantus 50 units twice a day along with pre-meal insulin,18 Units BID, SSI    Dehydration and hyponatremia : Continue IV fluids    Tobacco abuse- currently smoking cigars, counseling done    Gastroesophageal reflux disease- Not needing any medications currently    Bipolar disorder, in remission- Continue Remeron and Zoloft    History of marijuana use    History of osteomyelitis of

## 2018-09-20 NOTE — ANESTHESIA PRE PROCEDURE
Department of Anesthesiology  Preprocedure Note       Name:  Tracy Damon   Age:  46 y.o.  :  1970                                          MRN:  [id number]         Date:  2018      Surgeon: Zac Forbes):  Silver Darden MD    Procedure: Procedure(s):  EXCISIONAL DEBRIDEMENT RIGHT BKA STUMP    Medications prior to admission:   Prior to Admission medications    Medication Sig Start Date End Date Taking?  Authorizing Provider   acetaminophen (TYLENOL) 500 MG tablet Take 1,000 mg by mouth every 6 hours as needed for Pain   Yes Historical Provider, MD   insulin lispro (HUMALOG) 100 UNIT/ML injection vial Inject 20 Units into the skin 3 times daily (before meals) Patient also said he does additional units per a sliding scale if needed 18  Yes Court Melendez PA-C   insulin glargine (BASAGLAR KWIKPEN) 100 UNIT/ML injection pen Inject 40 Units into the skin 2 times daily 18  Yes Goldie Alvarado PA-C   mirtazapine (REMERON) 15 MG tablet Take 1 tablet by mouth nightly 18  Yes MARILYN Foy CNP   sertraline (ZOLOFT) 100 MG tablet Take 1.5 tablets by mouth daily 18  Yes MARILYN Foy CNP   Lancets MISC Use to test blood sugars 4 times daily DX:E11.65 18   Court Melendez PA-C   Insulin Syringe-Needle U-100 29G X 1/2\" 0.3 ML MISC 1 each by Does not apply route 4 times daily (after meals and at bedtime) 18   Court Melendez PA-C   Insulin Pen Needle 32G X 4 MM MISC 1 each by Does not apply route daily 18   Court Melendez PA-C   glucose monitoring kit (FREESTYLE) monitoring kit 1 kit by Does not apply route 4 times daily (after meals and at bedtime) 18   Court Melendez PA-C   blood glucose monitor strips Test blood sugars 4 times daily DX:E11.65 18   Court Melendez PA-C   Blood Glucose Monitoring Suppl LIANNE Use to test blood sugars DX:E11.65 18   MARILYN Foy CNP       Current medications:    Current Facility-Administered Medications Medication Dose Route Frequency Provider Last Rate Last Dose    metoprolol tartrate (LOPRESSOR) tablet 12.5 mg  12.5 mg Oral BID mAanda Hernandez MD   12.5 mg at 09/20/18 0842    insulin lispro (HUMALOG) injection vial 18 Units  18 Units Subcutaneous TID AC Amanda Hernandez MD        insulin lispro (HUMALOG) injection vial 0-6 Units  0-6 Units Subcutaneous TID WC Amanda Hernandez MD        insulin lispro (HUMALOG) injection vial 18 Units  18 Units Subcutaneous Once Jefferson Michael MD        insulin glargine (LANTUS) injection vial 25 Units  25 Units Subcutaneous Once Jefferson Michael MD        insulin glargine (LANTUS) injection vial 50 Units  50 Units Subcutaneous BID Amanda Hernandez MD   50 Units at 09/20/18 1037    enoxaparin (LOVENOX) injection 40 mg  40 mg Subcutaneous Daily Amanda Hernandez MD   40 mg at 09/19/18 1558    calcium-vitamin D (OSCAL-500) 500-200 MG-UNIT per tablet 1 tablet  1 tablet Oral BID Jefferson Michael MD   1 tablet at 09/19/18 2246    mirtazapine (REMERON) tablet 15 mg  15 mg Oral Nightly Roselyn Davenport MD   15 mg at 09/19/18 2246    sertraline (ZOLOFT) tablet 150 mg  150 mg Oral Daily Roselyn Davenport MD   150 mg at 09/19/18 0830    glucose (GLUTOSE) 40 % oral gel 15 g  15 g Oral PRN Roselyn Davenport MD        dextrose 50 % solution 12.5 g  12.5 g Intravenous PRN Roselyn Davenport MD        glucagon (rDNA) injection 1 mg  1 mg Intramuscular PRN Roselyn Davenport MD        dextrose 5 % solution  100 mL/hr Intravenous PRN Roselyn Davenport MD        sodium chloride flush 0.9 % injection 10 mL  10 mL Intravenous 2 times per day Roselyn Davenport MD   10 mL at 09/19/18 0830    sodium chloride flush 0.9 % injection 10 mL  10 mL Intravenous PRN Roselyn Davenport MD        magnesium hydroxide (MILK OF MAGNESIA) 400 MG/5ML suspension 30 mL  30 mL Oral Daily PRN Roselyn Davenport MD        ondansetron (ZOFRAN) injection 4 mg  4 mg Intravenous Q6H PRN Roselyn Davenport MD   4 mg at 09/19/18 2039    potassium chloride (KLOR-CON M) extended release tablet 40 mEq  40 mEq Oral PRN Roxann Tate MD        Or    potassium chloride 20 MEQ/15ML (10%) oral solution 40 mEq  40 mEq Oral PRN Roxann Tate MD        Or   Irma Antonio potassium chloride 10 mEq/100 mL IVPB (Peripheral Line)  10 mEq Intravenous PRN Roxann Tate MD        potassium chloride 10 mEq/100 mL IVPB (Peripheral Line)  10 mEq Intravenous PRN Roxann Tate MD        magnesium sulfate 1 g in dextrose 5 % 100 mL IVPB  1 g Intravenous PRN Roxann Tate MD        meropenem (MERREM) 500 mg in sodium chloride 0.9 % 100 mL IVPB  500 mg Intravenous Nadeen Serna MD   Stopped at 09/20/18 1043    lactobacillus (CULTURELLE) capsule 1 capsule  1 capsule Oral Daily with breakfast Roxann Tate MD   1 capsule at 09/19/18 0830    0.9 % sodium chloride infusion   Intravenous Continuous Roxann Tate  mL/hr at 09/20/18 0358      nicotine (NICODERM CQ) 21 MG/24HR 1 patch  1 patch Transdermal Q24H Roxann Tate MD        acetaminophen (TYLENOL) tablet 650 mg  650 mg Oral Q4H PRN Roxann Tate MD   650 mg at 09/19/18 2039    traMADol (ULTRAM) tablet 50 mg  50 mg Oral Q6H PRN Roxann Tate MD   50 mg at 09/20/18 1035    benzonatate (TESSALON) capsule 200 mg  200 mg Oral TID PRN Court Udeagbala, DO   200 mg at 09/19/18 2246       Allergies:     Allergies   Allergen Reactions    Pcn [Penicillins] Shortness Of Breath, Other (See Comments) and Nausea And Vomiting     Does not remember reactions    Clindamycin/Lincomycin Itching       Problem List:    Patient Active Problem List   Diagnosis Code    Status post below knee amputation of right lower extremity ([facility]) Z89.511    Acute blood loss anemia D62    Gait disturbance R26.9    Sebaceous cyst L72.3    Uncontrolled type 2 diabetes mellitus with hyperglycemia, with long-term current use of insulin ([facility]) E11.65, Z79.4    Severe bipolar II disorder, recent episode major depressive, remission ([facility]) F31.81    Bipolar affective disorder ([address] F31.9    Leukocytosis D72.829    Lactic acidosis E87.2    Dehydration E86.0    Dehydration with hyponatremia E87.1    Normocytic anemia D64.9    Obesity (BMI 30-39. 9) E66.9    Infection of below knee amputation stump ([facility]) T87.40    History of noncompliance with medical treatment Z91.19    Essential hypertension I10    Sepsis ([address] A41.9    Diabetic ulcer of lower leg ([facility]) E11.622, L97.909    Uncontrolled type 2 diabetes mellitus with foot ulcer, with long-term current use of insulin ([facility]) E11.621, E11.65, L97.509, Z79.4    History of right below knee amputation ([address] Z89.511    Tobacco dependence F17.200    History of osteomyelitis Z87.39    Gastroesophageal reflux disease without esophagitis K21.9    History of marijuana use Z87.898    Infection due to Gram positive bacteria A49.9    Abscess of right leg L02.415       Past Medical History:        Diagnosis Date    Bipolar 2 disorder ([facility])     previously followed with Dr. Lorna Selby and Ac[facility] Diabetes mellitus type 2, uncontrolled ([address]     Diabetic foot ulcer ([address]     Diabetic polyneuropathy ([address]     Diabetic ulcer of right foot associated with type 2 diabetes mellitus ([address] 12/10/2015    Essential hypertension     \"never been on b/p medication that I know of\"    GERD (gastroesophageal reflux disease)     Hammer toe of left foot     Heart murmur     denies any chest pain or palpitations    History of tobacco abuse     Hx of BKA, right ([facility])     Macular edema, diabetic, bilateral ([address] 05/04/2018    Dr. Peyman Martinez referred to retina specialist for 2nd opinion    Marijuana abuse in remission     Onychomycosis     WD-Skin ulcer of fourth toe of right foot with necrosis of bone ([address] 6/29/2016       Past Surgical History:        Procedure Laterality Date    ABSCESS DRAINAGE Right     foot    FOOT DEBRIDEMENT Right 07/01/2016 I & D    LEG AMPUTATION BELOW KNEE Right 07/20/2016    OTHER SURGICAL HISTORY Right 1/14/15    sole of foot I&D    MS DRAIN INFECT SHOULDER BURSA Left 8/18/2017    LEFT SHOULDER INCISION AND DRAINAGE performed by Jamie Bradford MD at [address] [place] [address] Drive Right 1/16/15    2nd toe with wound vac applied    WISDOM TOOTH EXTRACTION  ? when       Social History:    Social History   Substance Use Topics    Smoking status: Current Every Day Smoker     Packs/day: 5.00     Years: 13.00     Types: Cigars     Last attempt to quit: 4/9/2018    Smokeless tobacco: Never Used      Comment: 5 cigars a day    Alcohol use No      Comment: last drink \"several years ago\"                                Ready to quit: Not Answered  Counseling given: No      Vital Signs (Current):   Vitals:    09/19/18 2116 09/20/18 0354 09/20/18 0800 09/20/18 1130   BP: (!) 152/84 133/77 (!) 143/89 125/72   Pulse: 110 100 102 94   Resp: 20 18 18 8   Temp: 100 °F (37.8 °C) 98.8 °F (37.1 °C) 99 °F (37.2 °C) 97.6 °F (36.4 °C)   TempSrc: Oral Oral Oral Oral   SpO2: 95% 93% 93% 94%   Weight:       Height:                                                  BP Readings from Last 3 Encounters:   09/20/18 125/72   09/17/18 135/80   08/23/18 (!) 110/47       NPO Status:                                                                                 BMI:   Wt Readings from Last 3 Encounters:   09/18/18 235 lb (106.6 kg)   09/17/18 223 lb (101.2 kg)   08/23/18 224 lb (101.6 kg)     Body mass index is 37.93 kg/m².     CBC:   Lab Results   Component Value Date    WBC 14.7 09/20/2018    RBC 4.23 09/20/2018    HGB 12.6 09/20/2018    HCT 38.9 09/20/2018    MCV 92.0 09/20/2018    RDW 14.4 04/05/2018     09/20/2018       CMP:   Lab Results   Component Value Date     09/20/2018    K 3.8 09/20/2018    K 4.2 03/25/2018     09/20/2018    CO2 19 09/20/2018    BUN 7 09/20/2018    CREATININE 0.6 09/20/2018    GFRAA >60 08/24/2016    LABGLOM >90 09/20/2018    GLUCOSE 234 09/20/2018    PROT 6.6 09/18/2018    CALCIUM 8.6 09/20/2018    BILITOT 0.9 09/18/2018    ALKPHOS 88 09/18/2018    AST 18 09/18/2018    ALT <5 09/18/2018       POC Tests:   Recent Labs      09/20/18   1129   POCGLU  220*       Coags:   Lab Results   Component Value Date    PROTIME 12.2 07/19/2016    INR 1.07 07/19/2016    APTT 28.6 07/19/2016       HCG (If Applicable): No results found for: PREGTESTUR, PREGSERUM, HCG, HCGQUANT     ABGs: No results found for: PHART, PO2ART, CSJ0MWO, ZEV5ZFR, BEART, S5NJPLQV     Type & Screen (If Applicable):  No results found for: LABABO, [address]    Anesthesia Evaluation  Patient summary reviewed and Nursing notes reviewed no history of anesthetic complications:   Airway: Mallampati: II  TM distance: >3 FB   Neck ROM: full  Mouth opening: > = 3 FB Dental:    (+) edentulous      Pulmonary:Negative Pulmonary ROS   (+) decreased breath sounds,                             Cardiovascular:  Exercise tolerance: good (>4 METS),   (+) hypertension:,       ECG reviewed                        Neuro/Psych:   (+) neuromuscular disease:, psychiatric history:            GI/Hepatic/Renal:   (+) GERD:,           Endo/Other:    (+) DiabetesType II DM, poorly controlled, using insulin, . Pt had no PAT visit       Abdominal:   (+) obese,     Abdomen: soft. Vascular: negative vascular ROS. Anesthesia Plan      general     ASA 3       Induction: intravenous and rapid sequence. MIPS: Postoperative opioids intended and Prophylactic antiemetics administered. Anesthetic plan and risks discussed with patient. Plan discussed with CRNA.                   Domenic Gore DO   9/20/2018

## 2018-09-20 NOTE — PROGRESS NOTES
Other position/activity restrictions: R BKA with drainage from stump, don't use prosthesis until plan determined       Prior Level of Function:  ADL Assistance: Independent  Homemaking Assistance: Independent  Ambulation Assistance: Independent  Transfer Assistance: Independent  Additional Comments: Patient staying at [facility] prior to admit, which he states he has been there the past year and plans on going back there. He has a prosthesis in which he states that he wears all the time and does not otherwise use an assistive device for mobility. Subjective       Subjective: Josef Sims RN stating pt is to leave around noon for surgery. Pt lying in bed and agreeabel to UE ex only since he is leaving for surgery soon. Pain:  Pain Assessment  Patient Currently in Pain: Yes  Pain Level: 9  Pain Type: Acute pain  Pain Location: Leg  Pain Orientation: Right  Pain Descriptors: Aching  Pain Frequency: Intermittent  Pain Intervention(s): RN notified       Objective                                                                                                                                              Comment: Initiated education on UE strengthening HEP with green theraband and handout. Pt demo each ex with vcs for tech. Pt educated on completing HEP at least 1x day with pt verbalizing understanding.

## 2018-09-20 NOTE — PROGRESS NOTES
[address] [address]  INPATIENT PHYSICAL THERAPY  DAILYNOTE  [facility] ONC MED 5K - 5K-17/017-A    Time In: 1013  Time Out: 1036  Timed Code Treatment Minutes: 23 Minutes  Minutes: 23          Date: 2018  Patient Name: Jonathan Ball,  Gender:  male        MRN: [id number]  : 1970  (46 y.o.)  Referral Date : 18  Referring Practitioner: Esperanza Cardenas MD  Diagnosis: Sepsis  Additional Pertinent Hx: 47 y/o male admitted with sepsis secondary to right LE infected diabetic ulcer. No evidence for osteomyelitis. History of R BKA. Past Medical History:   Diagnosis Date    Bipolar 2 disorder ([address]     previously followed with Dr. Nino Bailey and Lulu Beal in [facility] Diabetes mellitus type 2, uncontrolled ([address]     Diabetic foot ulcer ([address]     Diabetic polyneuropathy ([address]     Diabetic ulcer of right foot associated with type 2 diabetes mellitus ([address] 12/10/2015    Essential hypertension     \"never been on b/p medication that I know of\"    GERD (gastroesophageal reflux disease)     Hammer toe of left foot     Heart murmur     denies any chest pain or palpitations    History of tobacco abuse     Hx of BKA, right ([facility])     Macular edema, diabetic, bilateral ([address] 2018    Dr. Marlen Cantu referred to retina specialist for 2nd opinion    Marijuana abuse in remission     Onychomycosis     WD-Skin ulcer of fourth toe of right foot with necrosis of bone ([address] 2016     Past Surgical History:   Procedure Laterality Date    ABSCESS DRAINAGE Right     foot    FOOT DEBRIDEMENT Right 2016    I & D    LEG AMPUTATION BELOW KNEE Right 2016    OTHER SURGICAL HISTORY Right 1/14/15    sole of foot I&D    MD DRAIN INFECT SHOULDER BURSA Left 2017    LEFT SHOULDER INCISION AND DRAINAGE performed by Veto Lacy MD at [address] [place] [address] Drive Right 1/16/15    2nd toe with wound vac applied    WISDOM TOOTH EXTRACTION  ? when Restrictions/Precautions:  Fall Risk           Other position/activity restrictions: R BKA with drainage from stump, don't use prosthesis until plan determined       Prior Level of Function:  ADL Assistance: Independent  Homemaking Assistance: Independent  Ambulation Assistance: Independent  Transfer Assistance: Independent  Additional Comments: Patient staying at [facility] prior to admit, which he states he has been there the past year and plans on going back there. He has a prosthesis in which he states that he wears all the time and does not otherwise use an assistive device for mobility. Subjective:  Chart Reviewed: Yes  Subjective: cooperative, per nursing pt going for surgery for I&D on RLE this pm, once pt up to w/c he wanted to stay up with nursing ok-to get washed up    Pain:  Yes. Pain Assessment  Pain Level: 9 (RLE)       Social/Functional:  Lives With: Alone  Type of Home: Facility ([facility])  Home Layout: Two level, Able to Live on Main level with bedroom/bathroom  Home Access: Ramped entrance  Home Equipment: Rolling walker     Objective:  Rolling to Right: Modified independent  Supine to Sit: Supervision  Scooting: Supervision    Transfers  Sit to Stand: Stand by assistance (edge of bed)  Stand to sit: Stand by assistance  Bed to Chair: Stand by assistance (bed to w/c, transferred to left), BUE support on bed or w/c      Balance  Sitting - Static: Good  Sitting - Dynamic: Good  Standing - Static: Fair      Exercises:  Exercises  Comments: sup LLE ankle pumps, BLE quad set, glut set, S.L.R. , hip ABD/ADD x10 reps with cues to hold isometrics to inc strength for mobility         Activity Tolerance:  Activity Tolerance: Patient Tolerated treatment well;Patient limited by pain    Assessment:   Body structures, Functions, Activity limitations: Decreased functional mobility , Decreased strength, Decreased endurance, Decreased balance  Assessment: pt agreeable to assess transfers

## 2018-09-21 ENCOUNTER — HOSPITAL ENCOUNTER (INPATIENT)
Age: 48
LOS: 13 days | Discharge: HOME HEALTH | DRG: 566 | End: 2018-10-04
Attending: FAMILY MEDICINE | Admitting: FAMILY MEDICINE
Payer: MEDICARE

## 2018-09-21 ENCOUNTER — TELEPHONE (OUTPATIENT)
Dept: FAMILY MEDICINE CLINIC | Age: 48
End: 2018-09-21

## 2018-09-21 VITALS
DIASTOLIC BLOOD PRESSURE: 68 MMHG | BODY MASS INDEX: 38.18 KG/M2 | HEART RATE: 84 BPM | SYSTOLIC BLOOD PRESSURE: 121 MMHG | WEIGHT: 237.6 LBS | HEIGHT: 66 IN | RESPIRATION RATE: 20 BRPM | TEMPERATURE: 97.8 F | OXYGEN SATURATION: 97 %

## 2018-09-21 DIAGNOSIS — T87.40 INFECTION OF BELOW KNEE AMPUTATION STUMP (HCC): Primary | ICD-10-CM

## 2018-09-21 DIAGNOSIS — E11.65 POORLY CONTROLLED TYPE 2 DIABETES MELLITUS (HCC): ICD-10-CM

## 2018-09-21 PROBLEM — R53.81 PHYSICAL DEBILITY: Status: ACTIVE | Noted: 2018-09-21

## 2018-09-21 PROBLEM — E66.01 CLASS 2 SEVERE OBESITY DUE TO EXCESS CALORIES WITH SERIOUS COMORBIDITY AND BODY MASS INDEX (BMI) OF 38.0 TO 38.9 IN ADULT (HCC): Status: ACTIVE | Noted: 2018-09-21

## 2018-09-21 PROBLEM — E66.812 CLASS 2 SEVERE OBESITY DUE TO EXCESS CALORIES WITH SERIOUS COMORBIDITY AND BODY MASS INDEX (BMI) OF 38.0 TO 38.9 IN ADULT: Status: ACTIVE | Noted: 2018-09-21

## 2018-09-21 LAB
AEROBIC CULTURE: ABNORMAL
AEROBIC CULTURE: ABNORMAL
ANAEROBIC CULTURE: ABNORMAL
ANION GAP SERPL CALCULATED.3IONS-SCNC: 14 MEQ/L (ref 8–16)
BUN BLDV-MCNC: 15 MG/DL (ref 7–22)
CALCIUM SERPL-MCNC: 8.4 MG/DL (ref 8.5–10.5)
CHLORIDE BLD-SCNC: 102 MEQ/L (ref 98–111)
CO2: 21 MEQ/L (ref 23–33)
CREAT SERPL-MCNC: 0.7 MG/DL (ref 0.4–1.2)
ERYTHROCYTE [DISTWIDTH] IN BLOOD BY AUTOMATED COUNT: 13.8 % (ref 11.5–14.5)
ERYTHROCYTE [DISTWIDTH] IN BLOOD BY AUTOMATED COUNT: 45.1 FL (ref 35–45)
GFR SERPL CREATININE-BSD FRML MDRD: > 90 ML/MIN/1.73M2
GLUCOSE BLD-MCNC: 162 MG/DL (ref 70–108)
GLUCOSE BLD-MCNC: 162 MG/DL (ref 70–108)
GLUCOSE BLD-MCNC: 334 MG/DL (ref 70–108)
GLUCOSE BLD-MCNC: 361 MG/DL (ref 70–108)
GLUCOSE BLD-MCNC: 396 MG/DL (ref 70–108)
GRAM STAIN RESULT: ABNORMAL
HCT VFR BLD CALC: 38.4 % (ref 42–52)
HEMOGLOBIN: 12.8 GM/DL (ref 14–18)
MCH RBC QN AUTO: 29.7 PG (ref 26–33)
MCHC RBC AUTO-ENTMCNC: 33.3 GM/DL (ref 32.2–35.5)
MCV RBC AUTO: 89.1 FL (ref 80–94)
ORGANISM: ABNORMAL
PLATELET # BLD: 283 THOU/MM3 (ref 130–400)
PMV BLD AUTO: 10.1 FL (ref 9.4–12.4)
POTASSIUM REFLEX MAGNESIUM: 4.6 MEQ/L (ref 3.5–5.2)
POTASSIUM SERPL-SCNC: 4.6 MEQ/L (ref 3.5–5.2)
RBC # BLD: 4.31 MILL/MM3 (ref 4.7–6.1)
SODIUM BLD-SCNC: 137 MEQ/L (ref 135–145)
WBC # BLD: 12.8 THOU/MM3 (ref 4.8–10.8)

## 2018-09-21 PROCEDURE — 6370000000 HC RX 637 (ALT 250 FOR IP): Performed by: INTERNAL MEDICINE

## 2018-09-21 PROCEDURE — 6370000000 HC RX 637 (ALT 250 FOR IP): Performed by: FAMILY MEDICINE

## 2018-09-21 PROCEDURE — 80048 BASIC METABOLIC PNL TOTAL CA: CPT

## 2018-09-21 PROCEDURE — 2580000003 HC RX 258: Performed by: INTERNAL MEDICINE

## 2018-09-21 PROCEDURE — 36415 COLL VENOUS BLD VENIPUNCTURE: CPT

## 2018-09-21 PROCEDURE — 82948 REAGENT STRIP/BLOOD GLUCOSE: CPT

## 2018-09-21 PROCEDURE — 6360000002 HC RX W HCPCS: Performed by: INTERNAL MEDICINE

## 2018-09-21 PROCEDURE — 2709999900 HC NON-CHARGEABLE SUPPLY

## 2018-09-21 PROCEDURE — 99024 POSTOP FOLLOW-UP VISIT: CPT | Performed by: NURSE PRACTITIONER

## 2018-09-21 PROCEDURE — 6360000002 HC RX W HCPCS: Performed by: SURGERY

## 2018-09-21 PROCEDURE — APPSS30 APP SPLIT SHARED TIME 16-30 MINUTES: Performed by: NURSE PRACTITIONER

## 2018-09-21 PROCEDURE — 99239 HOSP IP/OBS DSCHRG MGMT >30: CPT | Performed by: INTERNAL MEDICINE

## 2018-09-21 PROCEDURE — 0220000000 HC SKILLED NURSING FACILITY

## 2018-09-21 PROCEDURE — 6370000000 HC RX 637 (ALT 250 FOR IP): Performed by: SURGERY

## 2018-09-21 PROCEDURE — 85027 COMPLETE CBC AUTOMATED: CPT

## 2018-09-21 PROCEDURE — 1290000000 HC SEMI PRIVATE OTHER R&B

## 2018-09-21 RX ORDER — NICOTINE POLACRILEX 4 MG
15 LOZENGE BUCCAL PRN
Status: CANCELLED | OUTPATIENT
Start: 2018-09-21

## 2018-09-21 RX ORDER — SENNA PLUS 8.6 MG/1
1 TABLET ORAL NIGHTLY
Status: CANCELLED | OUTPATIENT
Start: 2018-09-21

## 2018-09-21 RX ORDER — HYDROCODONE BITARTRATE AND ACETAMINOPHEN 5; 325 MG/1; MG/1
2 TABLET ORAL EVERY 4 HOURS PRN
Status: DISCONTINUED | OUTPATIENT
Start: 2018-09-21 | End: 2018-09-29

## 2018-09-21 RX ORDER — NICOTINE 21 MG/24HR
1 PATCH, TRANSDERMAL 24 HOURS TRANSDERMAL EVERY 24 HOURS
Status: CANCELLED | OUTPATIENT
Start: 2018-09-21

## 2018-09-21 RX ORDER — DOCUSATE SODIUM 100 MG/1
100 CAPSULE, LIQUID FILLED ORAL 2 TIMES DAILY
Status: DISCONTINUED | OUTPATIENT
Start: 2018-09-22 | End: 2018-10-04 | Stop reason: HOSPADM

## 2018-09-21 RX ORDER — DOCUSATE SODIUM 100 MG/1
100 CAPSULE, LIQUID FILLED ORAL 2 TIMES DAILY
Status: DISCONTINUED | OUTPATIENT
Start: 2018-09-21 | End: 2018-09-21 | Stop reason: HOSPADM

## 2018-09-21 RX ORDER — HYDROCODONE BITARTRATE AND ACETAMINOPHEN 5; 325 MG/1; MG/1
1 TABLET ORAL EVERY 4 HOURS PRN
Status: DISCONTINUED | OUTPATIENT
Start: 2018-09-21 | End: 2018-09-29

## 2018-09-21 RX ORDER — DEXTROSE MONOHYDRATE 25 G/50ML
12.5 INJECTION, SOLUTION INTRAVENOUS PRN
Status: DISCONTINUED | OUTPATIENT
Start: 2018-09-21 | End: 2018-10-04 | Stop reason: HOSPADM

## 2018-09-21 RX ORDER — SENNA PLUS 8.6 MG/1
1 TABLET ORAL NIGHTLY
Status: DISCONTINUED | OUTPATIENT
Start: 2018-09-21 | End: 2018-09-21 | Stop reason: HOSPADM

## 2018-09-21 RX ORDER — POTASSIUM CHLORIDE 7.45 MG/ML
10 INJECTION INTRAVENOUS PRN
Status: DISCONTINUED | OUTPATIENT
Start: 2018-09-21 | End: 2018-10-04 | Stop reason: HOSPADM

## 2018-09-21 RX ORDER — ONDANSETRON 4 MG/1
4 TABLET, FILM COATED ORAL EVERY 8 HOURS PRN
Status: DISCONTINUED | OUTPATIENT
Start: 2018-09-21 | End: 2018-10-04 | Stop reason: HOSPADM

## 2018-09-21 RX ORDER — DEXTROSE MONOHYDRATE 50 MG/ML
100 INJECTION, SOLUTION INTRAVENOUS PRN
Status: CANCELLED | OUTPATIENT
Start: 2018-09-21

## 2018-09-21 RX ORDER — BENZONATATE 100 MG/1
200 CAPSULE ORAL 3 TIMES DAILY PRN
Status: DISCONTINUED | OUTPATIENT
Start: 2018-09-21 | End: 2018-09-23

## 2018-09-21 RX ORDER — INSULIN GLARGINE 100 [IU]/ML
50 INJECTION, SOLUTION SUBCUTANEOUS 2 TIMES DAILY
Status: DISCONTINUED | OUTPATIENT
Start: 2018-09-21 | End: 2018-09-30

## 2018-09-21 RX ORDER — LACTOBACILLUS RHAMNOSUS GG 10B CELL
1 CAPSULE ORAL
Status: DISCONTINUED | OUTPATIENT
Start: 2018-09-22 | End: 2018-10-04 | Stop reason: HOSPADM

## 2018-09-21 RX ORDER — FAMOTIDINE 20 MG/1
20 TABLET, FILM COATED ORAL DAILY
Status: DISCONTINUED | OUTPATIENT
Start: 2018-09-22 | End: 2018-10-04 | Stop reason: HOSPADM

## 2018-09-21 RX ORDER — INSULIN GLARGINE 100 [IU]/ML
50 INJECTION, SOLUTION SUBCUTANEOUS 2 TIMES DAILY
Status: CANCELLED | OUTPATIENT
Start: 2018-09-21

## 2018-09-21 RX ORDER — LACTOBACILLUS RHAMNOSUS GG 10B CELL
1 CAPSULE ORAL
Status: CANCELLED | OUTPATIENT
Start: 2018-09-22

## 2018-09-21 RX ORDER — BENZONATATE 100 MG/1
200 CAPSULE ORAL 3 TIMES DAILY PRN
Status: CANCELLED | OUTPATIENT
Start: 2018-09-21

## 2018-09-21 RX ORDER — SODIUM CHLORIDE 0.9 % (FLUSH) 0.9 %
10 SYRINGE (ML) INJECTION PRN
Status: DISCONTINUED | OUTPATIENT
Start: 2018-09-21 | End: 2018-10-04 | Stop reason: HOSPADM

## 2018-09-21 RX ORDER — SENNA PLUS 8.6 MG/1
1 TABLET ORAL NIGHTLY
Status: DISCONTINUED | OUTPATIENT
Start: 2018-09-21 | End: 2018-10-04 | Stop reason: HOSPADM

## 2018-09-21 RX ORDER — OYSTER SHELL CALCIUM WITH VITAMIN D 500; 200 MG/1; [IU]/1
1 TABLET, FILM COATED ORAL 2 TIMES DAILY
Status: CANCELLED | OUTPATIENT
Start: 2018-09-21

## 2018-09-21 RX ORDER — TRAMADOL HYDROCHLORIDE 50 MG/1
50 TABLET ORAL EVERY 6 HOURS PRN
Status: DISCONTINUED | OUTPATIENT
Start: 2018-09-21 | End: 2018-10-04 | Stop reason: HOSPADM

## 2018-09-21 RX ORDER — POTASSIUM CHLORIDE 20 MEQ/1
40 TABLET, EXTENDED RELEASE ORAL PRN
Status: DISCONTINUED | OUTPATIENT
Start: 2018-09-21 | End: 2018-10-04 | Stop reason: HOSPADM

## 2018-09-21 RX ORDER — POTASSIUM CHLORIDE 20MEQ/15ML
40 LIQUID (ML) ORAL PRN
Status: DISCONTINUED | OUTPATIENT
Start: 2018-09-21 | End: 2018-10-04 | Stop reason: HOSPADM

## 2018-09-21 RX ORDER — ACETAMINOPHEN 325 MG/1
650 TABLET ORAL EVERY 4 HOURS PRN
Status: DISCONTINUED | OUTPATIENT
Start: 2018-09-21 | End: 2018-10-04 | Stop reason: HOSPADM

## 2018-09-21 RX ORDER — OYSTER SHELL CALCIUM WITH VITAMIN D 500; 200 MG/1; [IU]/1
1 TABLET, FILM COATED ORAL 2 TIMES DAILY
Status: DISCONTINUED | OUTPATIENT
Start: 2018-09-21 | End: 2018-10-04 | Stop reason: HOSPADM

## 2018-09-21 RX ORDER — SODIUM CHLORIDE 0.9 % (FLUSH) 0.9 %
10 SYRINGE (ML) INJECTION PRN
Status: CANCELLED | OUTPATIENT
Start: 2018-09-21

## 2018-09-21 RX ORDER — ACETAMINOPHEN 325 MG/1
650 TABLET ORAL EVERY 4 HOURS PRN
Status: CANCELLED | OUTPATIENT
Start: 2018-09-21

## 2018-09-21 RX ORDER — DEXTROSE MONOHYDRATE 25 G/50ML
12.5 INJECTION, SOLUTION INTRAVENOUS PRN
Status: CANCELLED | OUTPATIENT
Start: 2018-09-21

## 2018-09-21 RX ORDER — TRAMADOL HYDROCHLORIDE 50 MG/1
50 TABLET ORAL EVERY 6 HOURS PRN
Status: CANCELLED | OUTPATIENT
Start: 2018-09-21

## 2018-09-21 RX ORDER — NICOTINE 21 MG/24HR
1 PATCH, TRANSDERMAL 24 HOURS TRANSDERMAL EVERY 24 HOURS
Status: DISCONTINUED | OUTPATIENT
Start: 2018-09-22 | End: 2018-09-23

## 2018-09-21 RX ORDER — POTASSIUM CHLORIDE 7.45 MG/ML
10 INJECTION INTRAVENOUS PRN
Status: CANCELLED | OUTPATIENT
Start: 2018-09-21

## 2018-09-21 RX ORDER — MIRTAZAPINE 15 MG/1
15 TABLET, FILM COATED ORAL NIGHTLY
Status: DISCONTINUED | OUTPATIENT
Start: 2018-09-21 | End: 2018-10-04 | Stop reason: HOSPADM

## 2018-09-21 RX ORDER — POTASSIUM CHLORIDE 20MEQ/15ML
40 LIQUID (ML) ORAL PRN
Status: CANCELLED | OUTPATIENT
Start: 2018-09-21

## 2018-09-21 RX ORDER — SODIUM CHLORIDE 0.9 % (FLUSH) 0.9 %
10 SYRINGE (ML) INJECTION EVERY 12 HOURS SCHEDULED
Status: DISCONTINUED | OUTPATIENT
Start: 2018-09-21 | End: 2018-10-04 | Stop reason: HOSPADM

## 2018-09-21 RX ORDER — HYDROCODONE BITARTRATE AND ACETAMINOPHEN 5; 325 MG/1; MG/1
2 TABLET ORAL EVERY 4 HOURS PRN
Status: CANCELLED | OUTPATIENT
Start: 2018-09-21

## 2018-09-21 RX ORDER — ONDANSETRON 2 MG/ML
4 INJECTION INTRAMUSCULAR; INTRAVENOUS EVERY 6 HOURS PRN
Status: DISCONTINUED | OUTPATIENT
Start: 2018-09-21 | End: 2018-09-21

## 2018-09-21 RX ORDER — HYDROCODONE BITARTRATE AND ACETAMINOPHEN 5; 325 MG/1; MG/1
1 TABLET ORAL EVERY 4 HOURS PRN
Status: CANCELLED | OUTPATIENT
Start: 2018-09-21

## 2018-09-21 RX ORDER — DOCUSATE SODIUM 100 MG/1
100 CAPSULE, LIQUID FILLED ORAL 2 TIMES DAILY
Status: CANCELLED | OUTPATIENT
Start: 2018-09-21

## 2018-09-21 RX ORDER — POTASSIUM CHLORIDE 20 MEQ/1
40 TABLET, EXTENDED RELEASE ORAL PRN
Status: CANCELLED | OUTPATIENT
Start: 2018-09-21

## 2018-09-21 RX ORDER — SODIUM CHLORIDE 0.9 % (FLUSH) 0.9 %
10 SYRINGE (ML) INJECTION EVERY 12 HOURS SCHEDULED
Status: CANCELLED | OUTPATIENT
Start: 2018-09-21

## 2018-09-21 RX ORDER — NICOTINE POLACRILEX 4 MG
15 LOZENGE BUCCAL PRN
Status: DISCONTINUED | OUTPATIENT
Start: 2018-09-21 | End: 2018-10-04 | Stop reason: HOSPADM

## 2018-09-21 RX ORDER — POLYETHYLENE GLYCOL 3350 17 G/17G
17 POWDER, FOR SOLUTION ORAL DAILY PRN
Status: DISCONTINUED | OUTPATIENT
Start: 2018-09-21 | End: 2018-10-04 | Stop reason: HOSPADM

## 2018-09-21 RX ORDER — MIRTAZAPINE 15 MG/1
15 TABLET, FILM COATED ORAL NIGHTLY
Status: CANCELLED | OUTPATIENT
Start: 2018-09-21

## 2018-09-21 RX ORDER — DEXTROSE MONOHYDRATE 50 MG/ML
100 INJECTION, SOLUTION INTRAVENOUS PRN
Status: DISCONTINUED | OUTPATIENT
Start: 2018-09-21 | End: 2018-10-04 | Stop reason: HOSPADM

## 2018-09-21 RX ORDER — ONDANSETRON 2 MG/ML
4 INJECTION INTRAMUSCULAR; INTRAVENOUS EVERY 6 HOURS PRN
Status: CANCELLED | OUTPATIENT
Start: 2018-09-21

## 2018-09-21 RX ADMIN — CEFTRIAXONE SODIUM 2 G: 2 INJECTION, POWDER, FOR SOLUTION INTRAMUSCULAR; INTRAVENOUS at 15:11

## 2018-09-21 RX ADMIN — INSULIN LISPRO 5 UNITS: 100 INJECTION, SOLUTION INTRAVENOUS; SUBCUTANEOUS at 08:53

## 2018-09-21 RX ADMIN — CALCIUM CARBONATE-VITAMIN D TAB 500 MG-200 UNIT 1 TABLET: 500-200 TAB at 08:44

## 2018-09-21 RX ADMIN — MEROPENEM 500 MG: 1 INJECTION, POWDER, FOR SOLUTION INTRAVENOUS at 09:01

## 2018-09-21 RX ADMIN — INSULIN GLARGINE 50 UNITS: 100 INJECTION, SOLUTION SUBCUTANEOUS at 21:55

## 2018-09-21 RX ADMIN — SERTRALINE 150 MG: 100 TABLET, FILM COATED ORAL at 08:43

## 2018-09-21 RX ADMIN — BENZONATATE 200 MG: 100 CAPSULE ORAL at 06:42

## 2018-09-21 RX ADMIN — MEROPENEM 500 MG: 1 INJECTION, POWDER, FOR SOLUTION INTRAVENOUS at 01:06

## 2018-09-21 RX ADMIN — CALCIUM CARBONATE-VITAMIN D TAB 500 MG-200 UNIT 1 TABLET: 500-200 TAB at 21:54

## 2018-09-21 RX ADMIN — HYDROCODONE BITARTRATE AND ACETAMINOPHEN 2 TABLET: 5; 325 TABLET ORAL at 21:54

## 2018-09-21 RX ADMIN — HYDROCODONE BITARTRATE AND ACETAMINOPHEN 2 TABLET: 5; 325 TABLET ORAL at 09:13

## 2018-09-21 RX ADMIN — Medication 1 CAPSULE: at 08:47

## 2018-09-21 RX ADMIN — ENOXAPARIN SODIUM 40 MG: 40 INJECTION SUBCUTANEOUS at 08:44

## 2018-09-21 RX ADMIN — SENNOSIDES 8.6 MG: 8.6 TABLET, FILM COATED ORAL at 21:54

## 2018-09-21 RX ADMIN — MIRTAZAPINE 15 MG: 15 TABLET, FILM COATED ORAL at 21:54

## 2018-09-21 RX ADMIN — HYDROCODONE BITARTRATE AND ACETAMINOPHEN 2 TABLET: 5; 325 TABLET ORAL at 15:30

## 2018-09-21 RX ADMIN — INSULIN LISPRO 5 UNITS: 100 INJECTION, SOLUTION INTRAVENOUS; SUBCUTANEOUS at 12:34

## 2018-09-21 RX ADMIN — INSULIN GLARGINE 50 UNITS: 100 INJECTION, SOLUTION SUBCUTANEOUS at 08:48

## 2018-09-21 RX ADMIN — METOPROLOL TARTRATE 12.5 MG: 25 TABLET ORAL at 08:43

## 2018-09-21 RX ADMIN — SODIUM CHLORIDE: 9 INJECTION, SOLUTION INTRAVENOUS at 08:39

## 2018-09-21 RX ADMIN — Medication 10 ML: at 21:54

## 2018-09-21 ASSESSMENT — PAIN SCALES - GENERAL
PAINLEVEL_OUTOF10: 4
PAINLEVEL_OUTOF10: 7
PAINLEVEL_OUTOF10: 7
PAINLEVEL_OUTOF10: 0
PAINLEVEL_OUTOF10: 0
PAINLEVEL_OUTOF10: 7
PAINLEVEL_OUTOF10: 0
PAINLEVEL_OUTOF10: 6
PAINLEVEL_OUTOF10: 4

## 2018-09-21 ASSESSMENT — PAIN DESCRIPTION - LOCATION: LOCATION: LEG

## 2018-09-21 ASSESSMENT — PAIN DESCRIPTION - PAIN TYPE: TYPE: SURGICAL PAIN

## 2018-09-21 ASSESSMENT — PAIN DESCRIPTION - ORIENTATION: ORIENTATION: RIGHT

## 2018-09-21 NOTE — PROGRESS NOTES
Patient is noted not to have VAC in place. Spoke with primary RN, primary RN to speak with Dr. Fawad Mckeon in regards for dressing changes. Will sign off. Staff to call with any concerns or questions.

## 2018-09-21 NOTE — OP NOTE
[address] [place] [address]                                 OPERATIVE REPORT    PATIENT NAME: Mady Lee                     :        1970  MED REC NO:   [id number]                           ROOM:       0017  ACCOUNT NO:   [de-identified]                           ADMIT DATE: 2018  PROVIDER:     Mariano Méndez M.D.    Rika Lino OF PROCEDURE:  2018    PREOPERATIVE DIAGNOSES:  1. Right lower extremity BKA stump, infected wound with necrosis. 2.  Uncontrolled diabetes mellitus. POSTOPERATIVE DIAGNOSES:  1. Right lower extremity BKA stump, infected wound with necrosis. 2.  Uncontrolled diabetes mellitus. PROCEDURE:  I and D, right lower extremity BKA stump abscess with  excisional debridement of skin, subcutaneous tissue, and muscle/fascia of  necrotic wound (3 x 4 cm). SURGEON:  Mariano Méndez M.D.    ASSISTANT:  Leobardo Garcia. JEFF Baxter    ANESTHESIA:  General.    ESTIMATED BLOOD LOSS:  10 mL. DRAINS:  None. COMPLICATIONS:  None. DISPOSITION:  Stable to the recovery room. INDICATIONS:  The patient is a 61-year-old gentleman who has right lower  extremity BKA from 2016. He has uncontrolled diabetes. Found to have  cellulitis of the stump with abscess. There was some necrosis on the  lateral posterior wound. Both operative and nonoperative intervention  plans were discussed. He understood and wished to proceed with surgery. Risks of surgery were further discussed. Some of the risks included but  were not limited to bleeding, the need for reoperation, severe chronic  postoperative pain or numbness, major vascular or nerve injury,  cardiopulmonary complications, anesthetic complications, seroma or hematoma  formation, wound breakdown, failure of the wound to heal, death.   After all  of the questions were answered in their entirety and the patient was  completely aware of the current situation, he

## 2018-09-21 NOTE — PROGRESS NOTES
Progress note: Infectious diseases    Patient - Sabas Vieira,  Age - 46 y.o.    - 1970      Room Number - 1U-72/335-C   MRN -  [id number]   Acct # - [de-identified]  Date of Admission -  2018 10:59 AM    SUBJECTIVE:   He had I and D  Area looks clean, packing removed  OBJECTIVE   VITALS    height is 5' 6\" (1.676 m) and weight is 237 lb 9.6 oz (107.8 kg). His oral temperature is 97.4 °F (36.3 °C). His blood pressure is 148/75 (abnormal) and his pulse is 81. His respiration is 20 and oxygen saturation is 95%. Wt Readings from Last 3 Encounters:   18 237 lb 9.6 oz (107.8 kg)   18 223 lb (101.2 kg)   18 224 lb (101.6 kg)       I/O (24 Hours)    Intake/Output Summary (Last 24 hours) at 18 0929  Last data filed at 18 0640   Gross per 24 hour   Intake           3686.7 ml   Output             2980 ml   Net            706.7 ml       General Appearance  Awake, alert, oriented,  not  In acute distress  HEENT - normocephalic, atraumatic, pink conjunctiva,  anicteric sclera  Neck - Supple, no mass. Lungs -  Bilateral good air entry, no rhonchi, no wheeze. Cardiovascular - Heart sounds are normal.    Abdomen - soft, not distended, nontender. Neurologic -oriented. Skin - No bruising or bleeding. Extremities -the swelling and redness is better.  Clean wound    MEDICATIONS:      cefTRIAXone (ROCEPHIN) IV  2 g Intravenous Q24H    metoprolol tartrate  12.5 mg Oral BID    insulin lispro  18 Units Subcutaneous TID AC    insulin lispro  0-6 Units Subcutaneous TID WC    insulin lispro  18 Units Subcutaneous Once    insulin glargine  25 Units Subcutaneous Once    insulin glargine  50 Units Subcutaneous BID    enoxaparin  40 mg Subcutaneous Q24H    calcium-vitamin D  1 tablet Oral BID    mirtazapine  15 mg Oral Nightly    sertraline  150 mg Oral Daily    sodium chloride flush  10 mL tomorrow  Carlyn Morales MD, FACP 9/21/2018 9:29 AM

## 2018-09-21 NOTE — PROGRESS NOTES
TCU consult received, chart reviewed, need to know the expected length of IV antibiotics. Notified Griselda BAILEY. She states pt plans to return to the [facility] from [facility] and that they will accept him back.

## 2018-09-21 NOTE — CARE COORDINATION
Phone call placed to Physician's scheduling regarding patient requesting to be established with [facility]. Spoke to Agatha Roman. Agatha Roman states the chart notes they attempted to call Jayce Wasserman on his phone. They did not contact this CM as they were supposed to per previous conversation with [facility] physician scheduling. They state they will not accept him into their practice because he has been a \"no show\" with [facility] physicians in the past. Explained to Agatha Roman this is a problem because Jayce Wasserman is in need of a new prosthetic device and he is unable to get one without a PCP. Jayce Wasserman has followed up with Nupur Hendrix CNP in the past and has an appointment at discharge with her as well, however this does not help Jayce Wasserman as they did not get him established with a PCP after his last appointment with her. Agatha Roman states she will review his chart and follow up with this CM.  Electronically signed by Charanjit Prajapati RN on 9/21/18 at 1:16 PM

## 2018-09-21 NOTE — PROGRESS NOTES
Randal Troncoso  Daily Progress Note    Pt Name: Joana Mejia Record Number: [id number]  Date of Birth 1970   Today's Date: 9/21/2018    Hospital day # 3     ASSESSMENT   1. Right lower extremity BKA stump infected wounds - POD # 1 Status post excisional debridement and I&D of RLE BKA stump  2. Wound with streptococcus species   3. Uncontrolled diabetes mellitus  4. Morbid obesity (BMI 52)  5. Fevers  6. Sepsis  7. Tobacco dependence/abuse  8. History osteomyelitis   has a past medical history of Bipolar 2 disorder ([address] Diabetes mellitus type 2, uncontrolled ([address] Diabetic foot ulcer ([address] Diabetic polyneuropathy ([address] Diabetic ulcer of right foot associated with type 2 diabetes mellitus ([address] Essential hypertension; GERD (gastroesophageal reflux disease); Hammer toe of left foot; Heart murmur; History of tobacco abuse; Hx of BKA, right ([address] Macular edema, diabetic, bilateral ([address] Marijuana abuse in remission; Onychomycosis; and 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 PLAN   1. Diet as tolerated   2. Pain control with Ultram   3. Close monitoring of blood sugars   4. Nicotine patch   5. DVT prophylaxis   6. Antibiotic therapy. Following wound cultures. ID following. 7. Wound packing changes and care per ID. Discussed with Dr. Ty Helton. Daily packing with Dakin's solution. 8. Clinically, doing well. Dr. Ty Helton would like him to go to TCU/Rehab or ECF for wound care prior to returning home and using prothesis. Will follow along. SUBJECTIVE   Chief complaint: Right BKA stump pain     Patient was stable overnight. Chart reviewed. No fevers. Updated by nursing staff. Denies chest discomfort or dyspnea. No N/V; (+) belching, flatus. Tolerating diet. Pain controlled with analgesia. Up with assistance. Right BKA stump wounds as pictured below. No bleeding. Packing changed.    CURRENT MEDICATIONS   Scheduled Meds:  

## 2018-09-21 NOTE — PROGRESS NOTES
Hospitalist Progress Note    Patient:  Shawanda Waters      Unit/Bed:5K-17/017-A    YOB: 1970    MRN: [id number]       Acct: [de-identified]     PCP: No primary care provider on file. Date of Admission: 9/18/2018    Chief Complaint: right lower leg wound, fever    Patient presented to the emergency room with chief complaint of fever at home 102, and also has wound on the right stumpWith discharge that started on the day of presentation. Did not have any chest pain or shortness of breath and has been having pain in the stump 5/10. Patient was evaluated one a prior in the emergency room for elevated blood sugar In 400s and was started on insulin just a few weeks ago. Interestingly in the emergency room the wound was not noted  And 4 elevated d-dimer CT scan was done and was negative for PE.and the patient also stated that he did not notice discharge until the day of presentation but has been having worsening Pain though. Patient uses a prosthetic leg and walks. Admits to smoking Cigars. Patient had fever of 102.1, Heart rate 122, blood pressure 142/76, Labs showed sodium 1129, blood sugar 349, WBC 18,900,     Hospital Course:   Admitted to the hospital and started on IV fluids, antibiotics and x-ray of the right femurs shows soft tissue swelling distal to feet tibia fibula. Infectious disease was consulted and felt that the patient has Infected right BKA stump with abscess and general surgery is also on board for incision and drainage. IND is being planned for 9/20.   Patient currently having soft stools are not watery, will monitor    9/20- I&D right lower extremity BKA stump abscess with excisional debridement skin, subcutaneous tissue & muscle/fascia necrotic wound (3x4 cm), done by surgery, added low dose metoprolol  Prior to preop, increased lantus to 50 BID and premeal to 18 TID and SSI, follow cultures    9/21- cultures growing Streptococcus agalactiae, change antibiotics to ceftriaxone 2 g IV every 24 hours, discussed with infectious disease, recommend antibiotics for 2-3 weeks, continue wound care,   PT and OT, discharged to skilled nursing facility, blood sugars still elevated, diet change to 60 g carb  Resume oral meds  No bowel movement, start stool softeners,      Subjective:    Denies any chest pain or cough today, passing gas no bowel movement since yesterday morning, wants to try stool softeners, pain well controlled with medications    Medications:  Reviewed    Infusion Medications    dextrose      sodium chloride 100 mL/hr at 09/20/18 2137     Scheduled Medications    metoprolol tartrate  12.5 mg Oral BID    insulin lispro  18 Units Subcutaneous TID AC    insulin lispro  0-6 Units Subcutaneous TID WC    insulin lispro  18 Units Subcutaneous Once    insulin glargine  25 Units Subcutaneous Once    insulin glargine  50 Units Subcutaneous BID    enoxaparin  40 mg Subcutaneous Q24H    calcium-vitamin D  1 tablet Oral BID    mirtazapine  15 mg Oral Nightly    sertraline  150 mg Oral Daily    sodium chloride flush  10 mL Intravenous 2 times per day    meropenem  500 mg Intravenous Q8H    lactobacillus  1 capsule Oral Daily with breakfast    nicotine  1 patch Transdermal Q24H     PRN Meds: morphine **OR** morphine, HYDROcodone 5 mg - acetaminophen **OR** HYDROcodone 5 mg - acetaminophen, glucose, dextrose, glucagon (rDNA), dextrose, sodium chloride flush, magnesium hydroxide, ondansetron, potassium chloride **OR** potassium chloride **OR** potassium chloride, potassium chloride, magnesium sulfate, acetaminophen, traMADol, benzonatate      Intake/Output Summary (Last 24 hours) at 09/21/18 0802  Last data filed at 09/21/18 0640   Gross per 24 hour   Intake           3686.7 ml   Output             2980 ml   Net            706.7 ml       Diet:  Dietary Nutrition Supplements: Other Oral Supplement (see comment)  Dietary Nutrition Supplements: Wound Healing Oral weeks      Diabetes mellitus type 2, uncontrolled  With hyperglycemia, insulin-dependent, Peripheral neuropathy  Lab Results   Component Value Date    LABA1C 11.0 (H) 09/18/2018     Lab Results   Component Value Date     06/30/2016     increase Lantus 50 units twice a day along with pre-meal insulin,18 Units BID, SSI   resum eoral meds    Dehydration and hyponatremia : well hydrated, stop IVF    Tobacco abuse- currently smoking cigars, counseling done    Gastroesophageal reflux disease- Not needing any medications currently    Bipolar disorder, in remission- Continue Remeron and Zoloft    History of marijuana use    History of osteomyelitis of right lower extremity status post amputation        Electronically signed by Dorris Buerger, MD on 9/21/2018 at 8:02 AM

## 2018-09-21 NOTE — CONSULTS
Class III (40.3)  · Comparative Standards (Estimated Nutrition Needs):  · Estimated Daily Total Kcal: 1202-8217 kcals  · Estimated Daily Protein (g): 73 grams or more    Estimated Intake vs Estimated Needs: Intake Improving    Nutrition Risk Level: Moderate    Nutrition Interventions:   Continue current diet, Continue current ONS, Vitamin Supplement  Continued Inpatient Monitoring, Education Completed, Coordination of Care (Heart Healthy Carbohydrate Controlled diet education completed on 9/19 & 9/21. RD contact information provided. Answered all questions at this time)    Nutrition Evaluation:   · Evaluation: Progressing toward goals   · Goals: Pt will consume 75% of more of meals to assist with wound healing during LOS. · Monitoring: Meal Intake, Supplement Intake, Diet Tolerance, Skin Integrity, Wound Healing, Ascites/Edema, Weight, Pertinent Labs    See Adult Nutrition Doc Flowsheet for more detail.      Electronically signed by Martir Szymanski RD, LD on 9/21/18 at 10:33 AM    Contact Number: [phone]

## 2018-09-21 NOTE — PLAN OF CARE
Problem: Falls - Risk of:  Goal: Will remain free from falls  Will remain free from falls   Outcome: Ongoing  No falls so far this shift. Fall precautions in place, call light within reach, bed in lowest position, side rails up x2, bed alarm on. Hourly rounding in place. Problem: Risk for Impaired Skin Integrity  Goal: Tissue integrity - skin and mucous membranes  Structural intactness and normal physiological function of skin and  mucous membranes. Outcome: Ongoing  Patient turns self in bed. Pillows for support. Redness to buttock. Wounds and redness to RLE (BKA). Problem: Pain Control  Goal: Maintain pain level at or below patient's acceptable level (or 5 if patient is unable to determine acceptable level)  Outcome: Ongoing  Patient denies any pain so far this shift. Continue to monitor. Problem: Discharge Planning:  Goal: Discharged to appropriate level of care  Discharged to appropriate level of care   Outcome: Ongoing  Patient on 5K for care. Discharge planning in process. Patient lives at home alone. Problem: Infection, Septic Shock:  Goal: Will show no infection signs and symptoms  Will show no infection signs and symptoms   Outcome: Ongoing  Last temp 100.0 and WBC 18.9. Patient is receiving IV vancomycin and merrem. Problem: Serum Glucose Level - Abnormal:  Goal: Ability to maintain appropriate glucose levels will improve  Ability to maintain appropriate glucose levels will improve   Outcome: Ongoing  Blood glucose level was 296 at HS. Lantus 40 units given. Comments: Care plan reviewed with patient. Patient verbalized understanding of the plan of care and contribute to goal setting.
Problem: Falls - Risk of:  Goal: Will remain free from falls  Will remain free from falls   Outcome: Ongoing  Patient free from falls this shift. Patient using call light appropriately. Call light in reach, hourly rounding, nonskid footwear on when out of bed, fall sign posted, bed alarm on, bed rails up x2. Patient at risk for falls due to inability to wear prosthetic because of wounds on R leg and weakness following surgery. Problem: Risk for Impaired Skin Integrity  Goal: Tissue integrity - skin and mucous membranes  Structural intactness and normal physiological function of skin and  mucous membranes. Outcome: Ongoing  Patient encouraged to turn and reposition every 2 hours. Skin assessments ongoing. Problem: Pain Control  Goal: Maintain pain level at or below patient's acceptable level (or 5 if patient is unable to determine acceptable level)  Outcome: Ongoing      Problem: Serum Glucose Level - Abnormal:  Goal: Ability to maintain appropriate glucose levels will improve  Ability to maintain appropriate glucose levels will improve   Outcome: Ongoing  Chems ac and hs and insulin given as ordered. Dietary consult placed for patient for education about diet for DM. Problem: Pain:  Goal: Pain level will decrease  Pain level will decrease   Outcome: Ongoing  Patient reporting pain of 0/10 with goal of no pain. Patient satisfied with current interventions. Pain assessments ongoing. Problem: Discharge Planning:  Goal: Patients continuum of care needs are met  Patients continuum of care needs are met   Outcome: Ongoing  Discharge plan to home discussed with patient. Problem: Nutrition  Goal: Optimal nutrition therapy  Outcome: Ongoing  Patient on carb control diet with chems ac and hs. Problem: Musculor/Skeletal Functional Status  Goal: Highest potential functional level  Outcome: Ongoing  Patient assisted with ADLs as needed. Comments: Care plan reviewed with patient.   Patient
Problem: Falls - Risk of:  Goal: Will remain free from falls  Will remain free from falls   Outcome: Ongoing  UP IN WHEELCHAIR PER PT,  HAS RIGHT  BKA STUMP    Problem: Pain Control  Goal: Maintain pain level at or below patient's acceptable level (or 5 if patient is unable to determine acceptable level)  TOOK NORCO FOR PAIN WITH DRESSING CHANGE    Problem: Skin Integrity/Risk  Goal: No skin breakdown during hospitalization  Outcome: Ongoing  MONITER     Problem: Pain:  Goal: Pain level will decrease  Pain level will decrease   Outcome: Ongoing  TOOK NORCO FOR PAIN WITH RELIEF    Problem: Discharge Planning:  Goal: Patients continuum of care needs are met  Patients continuum of care needs are met   Outcome: Ongoing  PLAN TCU ORDERED    Comments: Care plan reviewed with patient and . Patient and  verbalize understanding of the plan of care and contribute to goal setting.
Strong 9/18/2018  8:12 PM   Margins Unattached edges 9/19/2018 10:00 AM   Louise-wound Assessment Calloused 9/19/2018 10:00 AM   Paxton%Wound Bed 25 9/19/2018 10:00 AM   Red%Wound Bed 25 9/19/2018 10:00 AM   Yellow%Wound Bed 25 9/19/2018 10:00 AM   Number of days: 178       Wound 03/25/18 Knee Right;Lateral 2 (Active)   Wound Image   9/19/2018 10:00 AM   Wound Type Wound 9/19/2018 10:00 AM   Wound Unstageable 9/19/2018 10:00 AM   Dressing Status Changed 9/19/2018 10:00 AM   Dressing Changed Changed/New 9/19/2018 10:00 AM   Dressing/Treatment Dry dressing 9/19/2018 10:00 AM   Wound Cleansed Rinsed/Irrigated with saline 9/19/2018 10:00 AM   Wound Length (cm) 2 cm 9/19/2018 10:00 AM   Wound Width (cm) 2.5 cm 9/19/2018 10:00 AM   Wound Depth (cm)  0.5 9/19/2018 10:00 AM   Calculated Wound Size (cm^2) (l*w) 5 cm^2 9/19/2018 10:00 AM   Change in Wound Size % (l*w) -25 9/19/2018 10:00 AM   Wound Assessment Red;Slough 9/19/2018 10:00 AM   Drainage Amount Moderate 9/19/2018 10:00 AM   Drainage Description Foul purulent;Serosanguinous; Tan 9/19/2018 10:00 AM   Odor Mild 9/19/2018 10:00 AM   Margins Attached edges; Defined edges 9/19/2018 10:00 AM   Louise-wound Assessment Blanchable erythema 9/19/2018 10:00 AM   Red%Wound Bed 75 9/19/2018 10:00 AM   Yellow%Wound Bed 25 9/19/2018 10:00 AM   Number of days: 178           Comments:   Right BKA anterior    Right BKA lateral and posterior

## 2018-09-21 NOTE — CARE COORDINATION
Message left for Yesenia Palacios, TCU liaison, antibiotics planned for 2-3 weeks per Dr. Mel Aranda. Electronically signed by Daljit Ascencio RN on 9/21/18 at 12:44 PM

## 2018-09-21 NOTE — FLOWSHEET NOTE
SPOKE TO  DR Aries Tavares REGARDING  PATIENT HAS NOT HAD LOOSE STOOLS STATES WILL DISC  CDIFF ORDER

## 2018-09-21 NOTE — DISCHARGE SUMMARY
Hospital Medicine Discharge Summary      Patient Identification:   Didi Verdin   : 1970  MRN: [id number]   Account: [de-identified]      Patient's PCP: No primary care provider on file. Admit Date: 2018     Discharge Date:   2018     Admitting Physician: Rosa Hankins MD     Discharge Physician: Esme Javier MD     Discharge Diagnoses:  Sepsis due to infected amputation stump on right BKA site with abscess and necrotic wound of skin, soft tissue and  muscle secondary to Streptococcus agalactiae status post incision and debridement  Uncontrolled diabetes mellitus type 2 with hyperglycemia, insulin-dependent, peripheral neuropathy  Dehydration and hyponatremia  Tobacco abuse  Gastroesophageal reflux disease  Bipolar disorder in remission  History of marijuana use  History of osteomyelitis of the right lower extremity status post below knee amputation      The patient was seen and examined on day of discharge and this discharge summary is in conjunction with any daily progress note from day of discharge. Hospital Course:   Didi Verdin is a 46 y.o. male admitted to [facility] on 2018 for right lower leg wound, fever     Patient presented to the emergency room with chief complaint of fever at home 102, and also has wound on the right stumpWith discharge that started on the day of presentation. Did not have any chest pain or shortness of breath and has been having pain in the stump 5/10. Patient was evaluated one a prior in the emergency room for elevated blood sugar In 400s and was started on insulin just a few weeks ago. Interestingly in the emergency room the wound was not noted  And 4 elevated d-dimer CT scan was done and was negative for PE.and the patient also stated that he did not notice discharge until the day of presentation but has been having worsening Pain though. Patient uses a prosthetic leg and walks.   Admits to smoking Cigars.      Patient had see progress notes      Labs: For convenience and continuity at follow-up the following most recent labs are provided:      CBC:    Lab Results   Component Value Date    WBC 12.8 09/21/2018    HGB 12.8 09/21/2018    HCT 38.4 09/21/2018     09/21/2018       Renal:  Lab Results   Component Value Date     09/21/2018    K 4.6 09/21/2018    K 4.6 09/21/2018     09/21/2018    CO2 21 09/21/2018    BUN 15 09/21/2018    CREATININE 0.7 09/21/2018    CALCIUM 8.4 09/21/2018    PHOS 2.6 04/04/2018         Significant Diagnostic Studies    Radiology:   XR CHEST PORTABLE   Final Result   Unremarkable portable chest.            **This report has been created using voice recognition software. It may contain minor errors which are inherent in voice recognition technology. **      Final report electronically signed by Dr. Jonah Matias on 9/18/2018 1:20 PM      XR FEMUR RIGHT (MIN 2 VIEWS)   Final Result   Possible soft tissue swelling distal tibia fibula/scalp. No standard radiographic evidence of active osteomyelitis            **This report has been created using voice recognition software. It may contain minor errors which are inherent in voice recognition technology. **      Final report electronically signed by Dr. Theodore Rankin on 9/18/2018 12:20 PM      XR TIBIA FIBULA RIGHT (2 VIEWS)   Final Result   Possible soft tissue swelling distal tibia fibula/scalp. No standard radiographic evidence of active osteomyelitis            **This report has been created using voice recognition software. It may contain minor errors which are inherent in voice recognition technology. **      Final report electronically signed by Dr. Theodore Rankin on 9/18/2018 12:20 PM             Consults:     IP CONSULT TO SOCIAL WORK  IP CONSULT TO GENERAL SURGERY  IP CONSULT TO INFECTIOUS DISEASES  PHARMACY TO DOSE VANCOMYCIN  IP CONSULT TO DIETITIAN  IP CONSULT TO DIETITIAN  IP CONSULT TO REHAB/TCU ADMISSION COORDINATOR  IP CONSULT TO

## 2018-09-22 LAB
GLUCOSE BLD-MCNC: 103 MG/DL (ref 70–108)
GLUCOSE BLD-MCNC: 114 MG/DL (ref 70–108)
GLUCOSE BLD-MCNC: 139 MG/DL (ref 70–108)
GLUCOSE BLD-MCNC: 83 MG/DL (ref 70–108)

## 2018-09-22 PROCEDURE — 6360000002 HC RX W HCPCS: Performed by: INTERNAL MEDICINE

## 2018-09-22 PROCEDURE — 2709999900 HC NON-CHARGEABLE SUPPLY

## 2018-09-22 PROCEDURE — 1290000000 HC SEMI PRIVATE OTHER R&B

## 2018-09-22 PROCEDURE — 82948 REAGENT STRIP/BLOOD GLUCOSE: CPT

## 2018-09-22 PROCEDURE — 2580000003 HC RX 258: Performed by: INTERNAL MEDICINE

## 2018-09-22 PROCEDURE — 6370000000 HC RX 637 (ALT 250 FOR IP): Performed by: INTERNAL MEDICINE

## 2018-09-22 PROCEDURE — 2500000003 HC RX 250 WO HCPCS: Performed by: INTERNAL MEDICINE

## 2018-09-22 PROCEDURE — 6370000000 HC RX 637 (ALT 250 FOR IP): Performed by: FAMILY MEDICINE

## 2018-09-22 PROCEDURE — 97161 PT EVAL LOW COMPLEX 20 MIN: CPT

## 2018-09-22 PROCEDURE — 97110 THERAPEUTIC EXERCISES: CPT

## 2018-09-22 RX ADMIN — CEFTRIAXONE SODIUM 2 G: 2 INJECTION, POWDER, FOR SOLUTION INTRAMUSCULAR; INTRAVENOUS at 15:50

## 2018-09-22 RX ADMIN — HYOSCYAMINE SULFATE: 16 SOLUTION at 13:35

## 2018-09-22 RX ADMIN — Medication 5 UNITS: at 08:25

## 2018-09-22 RX ADMIN — HYDROCODONE BITARTRATE AND ACETAMINOPHEN 2 TABLET: 5; 325 TABLET ORAL at 09:55

## 2018-09-22 RX ADMIN — INSULIN GLARGINE 50 UNITS: 100 INJECTION, SOLUTION SUBCUTANEOUS at 21:06

## 2018-09-22 RX ADMIN — CALCIUM CARBONATE-VITAMIN D TAB 500 MG-200 UNIT 1 TABLET: 500-200 TAB at 08:24

## 2018-09-22 RX ADMIN — ENOXAPARIN SODIUM 40 MG: 40 INJECTION SUBCUTANEOUS at 08:24

## 2018-09-22 RX ADMIN — Medication 10 ML: at 21:01

## 2018-09-22 RX ADMIN — Medication 1 CAPSULE: at 08:24

## 2018-09-22 RX ADMIN — MIRTAZAPINE 15 MG: 15 TABLET, FILM COATED ORAL at 21:00

## 2018-09-22 RX ADMIN — HYDROCODONE BITARTRATE AND ACETAMINOPHEN 2 TABLET: 5; 325 TABLET ORAL at 21:00

## 2018-09-22 RX ADMIN — Medication 10 ML: at 13:37

## 2018-09-22 RX ADMIN — CALCIUM CARBONATE-VITAMIN D TAB 500 MG-200 UNIT 1 TABLET: 500-200 TAB at 21:00

## 2018-09-22 RX ADMIN — INSULIN GLARGINE 50 UNITS: 100 INJECTION, SOLUTION SUBCUTANEOUS at 08:29

## 2018-09-22 RX ADMIN — FAMOTIDINE 20 MG: 20 TABLET ORAL at 08:24

## 2018-09-22 RX ADMIN — SERTRALINE 150 MG: 100 TABLET, FILM COATED ORAL at 08:24

## 2018-09-22 ASSESSMENT — PAIN SCALES - GENERAL
PAINLEVEL_OUTOF10: 8

## 2018-09-22 ASSESSMENT — PAIN DESCRIPTION - ORIENTATION
ORIENTATION: RIGHT
ORIENTATION: RIGHT

## 2018-09-22 ASSESSMENT — PAIN DESCRIPTION - LOCATION
LOCATION: LEG
LOCATION: LEG

## 2018-09-22 ASSESSMENT — PAIN DESCRIPTION - PAIN TYPE
TYPE: SURGICAL PAIN
TYPE: SURGICAL PAIN

## 2018-09-22 ASSESSMENT — PAIN DESCRIPTION - ONSET: ONSET: ON-GOING

## 2018-09-22 ASSESSMENT — PAIN DESCRIPTION - FREQUENCY: FREQUENCY: INTERMITTENT

## 2018-09-22 ASSESSMENT — PAIN DESCRIPTION - DESCRIPTORS: DESCRIPTORS: ACHING;DISCOMFORT;SHARP

## 2018-09-22 NOTE — PROGRESS NOTES
DEBRIDEMENT RIGHT BKA STUMP performed by rAnol Rodriguez MD at [facility] [address] Right 1/16/15    2nd toe with wound vac applied    WISDOM TOOTH EXTRACTION  ? when       Restrictions/Precautions:  Fall Risk, General Precautions          Other position/activity restrictions: R BKA with drainage from stump, don't use prosthesis until cleared by doctor       Subjective:  Chart Reviewed: Yes  Patient assessed for rehabilitation services?: Yes  Subjective: Pt resting in bed and agrees to therapy. General:  Overall Orientation Status: Within Normal Limits    Vision: Impaired (limited vision in R eye)    Hearing: Within functional limits       Pain:  Yes. Pain Assessment  Pain Assessment: 0-10  Pain Level: 8  Pain Type: Surgical pain  Pain Location: Leg  Pain Orientation: Right       Social/Functional History:    Lives With: Alone  Type of Home: Facility ([facility] -  Pt is the )  Home Layout: Two level, Able to Live on Main level with bedroom/bathroom  Home Access: Ramped entrance  Home Equipment:  (Pt reports that he might have a wheelchair, but not sure.)         Ambulation Assistance: Independent (with BKA prosthetic)  Transfer Assistance: Independent        Additional Comments: Pt reports that he lives at and helps with management at [facility] prior to admit, which he states he has been there the past year and plans on going back there. He has a prosthesis in which he states that he wears all the time and does not otherwise use an assistive device for mobility.       Objective:     RLE AROM: WNL     LLE AROM : WNL        Strength RLE: Exception  Comment: hip 4/5; knee NT due to pain    Strength LLE: WFL  Comment: grossly 4/5       Sensation  Overall Sensation Status: Impaired (Pt reports some neuropathy in hands)  Light Touch: Partial deficits in the LLE (numbness in L LE)       Supine to Sit: Supervision (with HOB up about 20-30 deg and some use of

## 2018-09-23 LAB
BACTERIA: ABNORMAL /HPF
BILIRUBIN URINE: NEGATIVE
BLOOD, URINE: NEGATIVE
CASTS 2: ABNORMAL /LPF
CASTS UA: ABNORMAL /LPF
CHARACTER, URINE: CLEAR
COLOR: YELLOW
CRYSTALS, UA: ABNORMAL
EPITHELIAL CELLS, UA: ABNORMAL /HPF
GLUCOSE BLD-MCNC: 132 MG/DL (ref 70–108)
GLUCOSE BLD-MCNC: 138 MG/DL (ref 70–108)
GLUCOSE BLD-MCNC: 265 MG/DL (ref 70–108)
GLUCOSE BLD-MCNC: 62 MG/DL (ref 70–108)
GLUCOSE BLD-MCNC: 98 MG/DL (ref 70–108)
GLUCOSE URINE: NEGATIVE MG/DL
KETONES, URINE: NEGATIVE
LEUKOCYTE ESTERASE, URINE: ABNORMAL
MISCELLANEOUS 2: ABNORMAL
NITRITE, URINE: NEGATIVE
PH UA: 6
PROTEIN UA: NEGATIVE
RBC URINE: ABNORMAL /HPF
RENAL EPITHELIAL, UA: ABNORMAL
SPECIFIC GRAVITY, URINE: 1.01 (ref 1–1.03)
UROBILINOGEN, URINE: 0.2 EU/DL
WBC UA: ABNORMAL /HPF
YEAST: ABNORMAL

## 2018-09-23 PROCEDURE — 6370000000 HC RX 637 (ALT 250 FOR IP): Performed by: FAMILY MEDICINE

## 2018-09-23 PROCEDURE — 6360000002 HC RX W HCPCS: Performed by: INTERNAL MEDICINE

## 2018-09-23 PROCEDURE — 97530 THERAPEUTIC ACTIVITIES: CPT

## 2018-09-23 PROCEDURE — 1290000000 HC SEMI PRIVATE OTHER R&B

## 2018-09-23 PROCEDURE — 2580000003 HC RX 258: Performed by: INTERNAL MEDICINE

## 2018-09-23 PROCEDURE — 2709999900 HC NON-CHARGEABLE SUPPLY

## 2018-09-23 PROCEDURE — 97166 OT EVAL MOD COMPLEX 45 MIN: CPT

## 2018-09-23 PROCEDURE — 87086 URINE CULTURE/COLONY COUNT: CPT

## 2018-09-23 PROCEDURE — 81001 URINALYSIS AUTO W/SCOPE: CPT

## 2018-09-23 PROCEDURE — 6370000000 HC RX 637 (ALT 250 FOR IP): Performed by: INTERNAL MEDICINE

## 2018-09-23 PROCEDURE — 82948 REAGENT STRIP/BLOOD GLUCOSE: CPT

## 2018-09-23 PROCEDURE — 97535 SELF CARE MNGMENT TRAINING: CPT

## 2018-09-23 RX ORDER — TIZANIDINE 4 MG/1
4 TABLET ORAL EVERY 6 HOURS PRN
Status: DISCONTINUED | OUTPATIENT
Start: 2018-09-23 | End: 2018-09-25

## 2018-09-23 RX ADMIN — SERTRALINE 150 MG: 100 TABLET, FILM COATED ORAL at 09:21

## 2018-09-23 RX ADMIN — HYOSCYAMINE SULFATE: 16 SOLUTION at 09:29

## 2018-09-23 RX ADMIN — CEFTRIAXONE SODIUM 2 G: 2 INJECTION, POWDER, FOR SOLUTION INTRAMUSCULAR; INTRAVENOUS at 14:23

## 2018-09-23 RX ADMIN — Medication 1 CAPSULE: at 09:21

## 2018-09-23 RX ADMIN — Medication 10 ML: at 09:22

## 2018-09-23 RX ADMIN — Medication 10 ML: at 21:13

## 2018-09-23 RX ADMIN — INSULIN GLARGINE 50 UNITS: 100 INJECTION, SOLUTION SUBCUTANEOUS at 09:23

## 2018-09-23 RX ADMIN — CALCIUM CARBONATE-VITAMIN D TAB 500 MG-200 UNIT 1 TABLET: 500-200 TAB at 21:10

## 2018-09-23 RX ADMIN — CALCIUM CARBONATE-VITAMIN D TAB 500 MG-200 UNIT 1 TABLET: 500-200 TAB at 09:21

## 2018-09-23 RX ADMIN — ENOXAPARIN SODIUM 40 MG: 40 INJECTION SUBCUTANEOUS at 09:22

## 2018-09-23 RX ADMIN — MIRTAZAPINE 15 MG: 15 TABLET, FILM COATED ORAL at 21:10

## 2018-09-23 RX ADMIN — FAMOTIDINE 20 MG: 20 TABLET ORAL at 09:21

## 2018-09-23 RX ADMIN — DOCUSATE SODIUM 100 MG: 100 CAPSULE, LIQUID FILLED ORAL at 09:21

## 2018-09-23 RX ADMIN — HYDROCODONE BITARTRATE AND ACETAMINOPHEN 2 TABLET: 5; 325 TABLET ORAL at 09:14

## 2018-09-23 RX ADMIN — Medication 18 UNITS: at 09:24

## 2018-09-23 RX ADMIN — INSULIN GLARGINE 50 UNITS: 100 INJECTION, SOLUTION SUBCUTANEOUS at 21:15

## 2018-09-23 RX ADMIN — HYDROCODONE BITARTRATE AND ACETAMINOPHEN 2 TABLET: 5; 325 TABLET ORAL at 21:11

## 2018-09-23 ASSESSMENT — PAIN SCALES - GENERAL
PAINLEVEL_OUTOF10: 8
PAINLEVEL_OUTOF10: 9
PAINLEVEL_OUTOF10: 0
PAINLEVEL_OUTOF10: 8
PAINLEVEL_OUTOF10: 9
PAINLEVEL_OUTOF10: 0

## 2018-09-23 ASSESSMENT — PAIN DESCRIPTION - DESCRIPTORS: DESCRIPTORS: ACHING;DISCOMFORT

## 2018-09-23 ASSESSMENT — PAIN DESCRIPTION - FREQUENCY: FREQUENCY: INTERMITTENT

## 2018-09-23 ASSESSMENT — PAIN DESCRIPTION - PAIN TYPE
TYPE: SURGICAL PAIN
TYPE: SURGICAL PAIN

## 2018-09-23 ASSESSMENT — PAIN DESCRIPTION - LOCATION
LOCATION: LEG
LOCATION: LEG

## 2018-09-23 ASSESSMENT — PAIN DESCRIPTION - ONSET: ONSET: ON-GOING

## 2018-09-23 ASSESSMENT — PAIN DESCRIPTION - ORIENTATION
ORIENTATION: RIGHT
ORIENTATION: RIGHT

## 2018-09-23 NOTE — PROGRESS NOTES
Patient: Adrianna Saravia  Unit/Bed: 8E-68/068-A  YOB: 1970  MRN: [id number] Acct: [de-identified]   Admitting Diagnosis: Physical debility [R53.81]  Admit Date:  9/21/2018  Hospital Day: 2    Assessment:     Active Problems:    Uncontrolled type 2 diabetes mellitus with hyperglycemia, with long-term current use of insulin ([facility])    Bipolar affective disorder (HCC)    Infection of below knee amputation stump ([facility]    Essential hypertension    Diabetic ulcer of lower leg ([facility]    Uncontrolled type 2 diabetes mellitus with foot ulcer, with long-term current use of insulin ([facility]    History of right below knee amputation ([facility])    Tobacco dependence    Gastroesophageal reflux disease without esophagitis    Necrosis of amputation stump ([facility])    Physical debility    Class 2 severe obesity due to excess calories with serious comorbidity and body mass index (BMI) of 38.0 to 38.9 in [facility])  Resolved Problems:    * No resolved hospital problems. *      Plan:     Stop the nicotine patch as he wants to quit on his own  Change the diet to general as the carb numbers at home are greater and he is having episodes of low CS in the AM the past 2 days  Follow up UA  Continue ATB and therapies        Subjective:     Patient has no complaint of CP, SOB, or GI upset.    Medication side effects: none    Scheduled Meds:   cefTRIAXone (ROCEPHIN) IV  2 g Intravenous Q24H    enoxaparin  40 mg Subcutaneous Q24H    sodium chloride flush  10 mL Intravenous 2 times per day    calcium-vitamin D  1 tablet Oral BID    docusate sodium  100 mg Oral BID    insulin glargine  50 Units Subcutaneous BID    insulin lispro  18 Units Subcutaneous TID AC    insulin lispro  0-6 Units Subcutaneous TID WC    lactobacillus  1 capsule Oral Daily with breakfast    mirtazapine  15 mg Oral Nightly    [START ON 9/24/2018] ppd   Does not apply Weekly    senna  1 tablet Oral Nightly    sertraline  150 mg Oral Daily    sodium hypochlorite

## 2018-09-24 LAB
BLOOD CULTURE, ROUTINE: NORMAL
BLOOD CULTURE, ROUTINE: NORMAL
GLUCOSE BLD-MCNC: 109 MG/DL (ref 70–108)
GLUCOSE BLD-MCNC: 152 MG/DL (ref 70–108)
GLUCOSE BLD-MCNC: 161 MG/DL (ref 70–108)
GLUCOSE BLD-MCNC: 233 MG/DL (ref 70–108)
GLUCOSE BLD-MCNC: 67 MG/DL (ref 70–108)
GLUCOSE BLD-MCNC: 70 MG/DL (ref 70–108)

## 2018-09-24 PROCEDURE — 97110 THERAPEUTIC EXERCISES: CPT

## 2018-09-24 PROCEDURE — 97530 THERAPEUTIC ACTIVITIES: CPT

## 2018-09-24 PROCEDURE — 2580000003 HC RX 258: Performed by: INTERNAL MEDICINE

## 2018-09-24 PROCEDURE — 97116 GAIT TRAINING THERAPY: CPT

## 2018-09-24 PROCEDURE — 6360000002 HC RX W HCPCS: Performed by: INTERNAL MEDICINE

## 2018-09-24 PROCEDURE — 6370000000 HC RX 637 (ALT 250 FOR IP): Performed by: FAMILY MEDICINE

## 2018-09-24 PROCEDURE — 82948 REAGENT STRIP/BLOOD GLUCOSE: CPT

## 2018-09-24 PROCEDURE — 6370000000 HC RX 637 (ALT 250 FOR IP): Performed by: INTERNAL MEDICINE

## 2018-09-24 PROCEDURE — 1290000000 HC SEMI PRIVATE OTHER R&B

## 2018-09-24 PROCEDURE — 2709999900 HC NON-CHARGEABLE SUPPLY

## 2018-09-24 RX ADMIN — SERTRALINE 150 MG: 100 TABLET, FILM COATED ORAL at 10:03

## 2018-09-24 RX ADMIN — INSULIN GLARGINE 50 UNITS: 100 INJECTION, SOLUTION SUBCUTANEOUS at 10:06

## 2018-09-24 RX ADMIN — HYDROCODONE BITARTRATE AND ACETAMINOPHEN 2 TABLET: 5; 325 TABLET ORAL at 09:10

## 2018-09-24 RX ADMIN — Medication 18 UNITS: at 10:14

## 2018-09-24 RX ADMIN — TIZANIDINE 4 MG: 4 TABLET ORAL at 17:43

## 2018-09-24 RX ADMIN — FAMOTIDINE 20 MG: 20 TABLET ORAL at 10:03

## 2018-09-24 RX ADMIN — TIZANIDINE 4 MG: 4 TABLET ORAL at 10:04

## 2018-09-24 RX ADMIN — Medication 10 ML: at 16:02

## 2018-09-24 RX ADMIN — HYOSCYAMINE SULFATE: 16 SOLUTION at 12:57

## 2018-09-24 RX ADMIN — CEFTRIAXONE SODIUM 2 G: 2 INJECTION, POWDER, FOR SOLUTION INTRAMUSCULAR; INTRAVENOUS at 16:01

## 2018-09-24 RX ADMIN — HYDROCODONE BITARTRATE AND ACETAMINOPHEN 2 TABLET: 5; 325 TABLET ORAL at 13:34

## 2018-09-24 RX ADMIN — CALCIUM CARBONATE-VITAMIN D TAB 500 MG-200 UNIT 1 TABLET: 500-200 TAB at 10:03

## 2018-09-24 RX ADMIN — Medication 10 ML: at 10:10

## 2018-09-24 RX ADMIN — CALCIUM CARBONATE-VITAMIN D TAB 500 MG-200 UNIT 1 TABLET: 500-200 TAB at 20:52

## 2018-09-24 RX ADMIN — MIRTAZAPINE 15 MG: 15 TABLET, FILM COATED ORAL at 20:51

## 2018-09-24 RX ADMIN — Medication 10 ML: at 20:52

## 2018-09-24 RX ADMIN — Medication 18 UNITS: at 12:54

## 2018-09-24 RX ADMIN — INSULIN GLARGINE 50 UNITS: 100 INJECTION, SOLUTION SUBCUTANEOUS at 22:50

## 2018-09-24 RX ADMIN — Medication 1 CAPSULE: at 10:03

## 2018-09-24 RX ADMIN — ENOXAPARIN SODIUM 40 MG: 40 INJECTION SUBCUTANEOUS at 10:03

## 2018-09-24 ASSESSMENT — PAIN SCALES - GENERAL
PAINLEVEL_OUTOF10: 8
PAINLEVEL_OUTOF10: 0
PAINLEVEL_OUTOF10: 8

## 2018-09-24 ASSESSMENT — PAIN DESCRIPTION - ONSET: ONSET: ON-GOING

## 2018-09-24 ASSESSMENT — PAIN DESCRIPTION - FREQUENCY: FREQUENCY: INTERMITTENT

## 2018-09-24 ASSESSMENT — PAIN DESCRIPTION - PAIN TYPE
TYPE: SURGICAL PAIN
TYPE: ACUTE PAIN

## 2018-09-24 ASSESSMENT — PAIN DESCRIPTION - ORIENTATION
ORIENTATION: RIGHT;DISTAL
ORIENTATION: RIGHT

## 2018-09-24 ASSESSMENT — PAIN DESCRIPTION - DESCRIPTORS
DESCRIPTORS: HEAVINESS;TIGHTNESS
DESCRIPTORS: ACHING

## 2018-09-24 ASSESSMENT — PAIN DESCRIPTION - LOCATION
LOCATION: OTHER (COMMENT)
LOCATION: LEG

## 2018-09-24 NOTE — PROGRESS NOTES
any assistance. Patient's goal is to return to his position at the [facility] [employer] once discharged from [address]:     Weight:  Weight: has been stable     Wounds/Incisions/Ulcers:  Skin/Wound Issues: daily dressing changes  Bowel: continent  Bladder:continent     Pain: Patient's pain is currently controlled with tramadol, norco, and tylenol    Education Provided:  Diabetic:  Yes on insulin  Peg Tube:No    Mitchell Care:  Education:NA  Removal Necessary:  NA  Supplies Needed: NA     Anticoagulant Use:  Patient on lovenox for anticoagulation, which is being managed by Primary Care Physician    Antibiotic Use:  Patient on antibiotics with a stop date of rocephin to stop 10/4/18    Psychotropic Medication Use:  Patient on remeron and zoloft     Oxygen Use: No    Home Medications Reconciled: N/A    Physical Therapy:   Bed Mobility  Scooting: Supervision  Transfers  Sit to Stand: Contact guard assistance  Stand to sit: Contact guard assistance  Ambulation 1  Surface: level tile  Device: Standard Walker  Assistance: Contact guard assistance  Quality of Gait: 3-4 ft  PT Equipment Recommendations  Other: continue to assess - likely will need wheelchair and SW    Occupational  Therapy:   ADL  Feeding: Independent  Grooming: Setup, Stand by assistance (Sponge bath while sitting EOB. )  LE Bathing: Maximum assistance  UE Dressing: Setup, Stand by assistance (To don tshirt. )  LE Dressing: Maximum assistance (To don underwear and shorts. )  Toileting: Maximum assistance (For clothing management. )       Speech Therapy:       Nutrition:    Weight: 236 lb 4.8 oz (107.2 kg) / Body mass index is 38.16 kg/m². Please see nutrition note for details.     Family Education: No family available for education  Fall Risk:  Falling star program initiated    Goal Achievement:   Patient Continues to progress toward goal achievement    Discharge Plan   Estimated Length of Stay: re eval  Destination: discharge home with

## 2018-09-25 LAB
AEROBIC CULTURE: ABNORMAL
AEROBIC CULTURE: ABNORMAL
ANAEROBIC CULTURE: ABNORMAL
ANAEROBIC CULTURE: ABNORMAL
GLUCOSE BLD-MCNC: 121 MG/DL (ref 70–108)
GLUCOSE BLD-MCNC: 132 MG/DL (ref 70–108)
GLUCOSE BLD-MCNC: 170 MG/DL (ref 70–108)
GLUCOSE BLD-MCNC: 64 MG/DL (ref 70–108)
GRAM STAIN RESULT: ABNORMAL
GRAM STAIN RESULT: ABNORMAL
ORGANISM: ABNORMAL
URINE CULTURE REFLEX: ABNORMAL

## 2018-09-25 PROCEDURE — 1290000000 HC SEMI PRIVATE OTHER R&B

## 2018-09-25 PROCEDURE — 82948 REAGENT STRIP/BLOOD GLUCOSE: CPT

## 2018-09-25 PROCEDURE — 6370000000 HC RX 637 (ALT 250 FOR IP): Performed by: INTERNAL MEDICINE

## 2018-09-25 PROCEDURE — 6360000002 HC RX W HCPCS: Performed by: INTERNAL MEDICINE

## 2018-09-25 PROCEDURE — 6370000000 HC RX 637 (ALT 250 FOR IP): Performed by: FAMILY MEDICINE

## 2018-09-25 PROCEDURE — 97535 SELF CARE MNGMENT TRAINING: CPT

## 2018-09-25 PROCEDURE — 2709999900 HC NON-CHARGEABLE SUPPLY

## 2018-09-25 PROCEDURE — 97530 THERAPEUTIC ACTIVITIES: CPT

## 2018-09-25 PROCEDURE — 97110 THERAPEUTIC EXERCISES: CPT

## 2018-09-25 PROCEDURE — 2580000003 HC RX 258: Performed by: INTERNAL MEDICINE

## 2018-09-25 RX ORDER — TIZANIDINE 4 MG/1
4 TABLET ORAL 4 TIMES DAILY
Status: DISCONTINUED | OUTPATIENT
Start: 2018-09-25 | End: 2018-10-04 | Stop reason: HOSPADM

## 2018-09-25 RX ADMIN — Medication 10 ML: at 10:15

## 2018-09-25 RX ADMIN — Medication 10 ML: at 21:59

## 2018-09-25 RX ADMIN — HYDROCODONE BITARTRATE AND ACETAMINOPHEN 2 TABLET: 5; 325 TABLET ORAL at 15:35

## 2018-09-25 RX ADMIN — CALCIUM CARBONATE-VITAMIN D TAB 500 MG-200 UNIT 1 TABLET: 500-200 TAB at 09:39

## 2018-09-25 RX ADMIN — Medication 1 CAPSULE: at 09:39

## 2018-09-25 RX ADMIN — TIZANIDINE 4 MG: 4 TABLET ORAL at 10:16

## 2018-09-25 RX ADMIN — Medication 18 UNITS: at 18:03

## 2018-09-25 RX ADMIN — FAMOTIDINE 20 MG: 20 TABLET ORAL at 09:39

## 2018-09-25 RX ADMIN — CEFTRIAXONE SODIUM 2 G: 2 INJECTION, POWDER, FOR SOLUTION INTRAMUSCULAR; INTRAVENOUS at 15:13

## 2018-09-25 RX ADMIN — MIRTAZAPINE 15 MG: 15 TABLET, FILM COATED ORAL at 21:58

## 2018-09-25 RX ADMIN — ENOXAPARIN SODIUM 40 MG: 40 INJECTION SUBCUTANEOUS at 09:39

## 2018-09-25 RX ADMIN — SERTRALINE 150 MG: 100 TABLET, FILM COATED ORAL at 09:39

## 2018-09-25 RX ADMIN — HYOSCYAMINE SULFATE: 16 SOLUTION at 15:20

## 2018-09-25 RX ADMIN — Medication 18 UNITS: at 10:12

## 2018-09-25 RX ADMIN — INSULIN GLARGINE 50 UNITS: 100 INJECTION, SOLUTION SUBCUTANEOUS at 10:13

## 2018-09-25 RX ADMIN — TIZANIDINE 4 MG: 4 TABLET ORAL at 21:58

## 2018-09-25 RX ADMIN — HYDROCODONE BITARTRATE AND ACETAMINOPHEN 2 TABLET: 5; 325 TABLET ORAL at 09:30

## 2018-09-25 RX ADMIN — INSULIN GLARGINE 50 UNITS: 100 INJECTION, SOLUTION SUBCUTANEOUS at 21:58

## 2018-09-25 RX ADMIN — Medication 18 UNITS: at 14:04

## 2018-09-25 RX ADMIN — TIZANIDINE 4 MG: 4 TABLET ORAL at 17:25

## 2018-09-25 RX ADMIN — CALCIUM CARBONATE-VITAMIN D TAB 500 MG-200 UNIT 1 TABLET: 500-200 TAB at 21:58

## 2018-09-25 ASSESSMENT — PAIN SCALES - GENERAL
PAINLEVEL_OUTOF10: 8
PAINLEVEL_OUTOF10: 7
PAINLEVEL_OUTOF10: 2
PAINLEVEL_OUTOF10: 8
PAINLEVEL_OUTOF10: 6
PAINLEVEL_OUTOF10: 5
PAINLEVEL_OUTOF10: 6
PAINLEVEL_OUTOF10: 8

## 2018-09-25 ASSESSMENT — PAIN DESCRIPTION - LOCATION: LOCATION: LEG

## 2018-09-25 ASSESSMENT — PAIN DESCRIPTION - ORIENTATION: ORIENTATION: RIGHT

## 2018-09-25 ASSESSMENT — PAIN DESCRIPTION - PAIN TYPE: TYPE: ACUTE PAIN

## 2018-09-25 NOTE — TELEPHONE ENCOUNTER
Pt is in [address] [place] [address] at [address] [place] [address] Dr. Berman still as of today.

## 2018-09-25 NOTE — PROGRESS NOTES
functional transfers and ADl routine   Activity Tolerance:  Activity Tolerance: Patient limited by pain    Assessment:     Performance deficits / Impairments: Decreased functional mobility , Decreased endurance, Decreased ADL status, Decreased balance, Decreased safe awareness, Decreased high-level IADLs  Prognosis: Fair  Discharge Recommendations: Patient would benefit from continued therapy after discharge    Patient Education:  Patient Education: UE exercise, importance of moving and transfers    Equipment Recommendations:  Equipment Needed: Yes  Other: Pt would benefit from w/c & LHAE    Safety:  Safety Devices in place: Yes  Type of devices: All fall risk precautions in place, Call light within reach, Gait belt, Patient at risk for falls, Left in chair, Chair alarm in place    Plan:  Times per week: 6x  Current Treatment Recommendations: Balance Training, Functional Mobility Training, Endurance Training, Safety Education & Training, Self-Care / ADL, Home Management Training, Patient/Caregiver Education & Training, Equipment Evaluation, Education, & procurement, Strengthening    Goals:  Patient goals : Get back to [facility] term goals  Time Frame for Short term goals: 1 weeks  Short term goal 1: Pt complete 2 min standing with CGA & alternating 1 UE support to increase balance for standing during toileting. Short term goal 2: Pt will complete short distance with CGA and min vc for safety to increase indep and safety with all self cares. Short term goal 3: Pt complete BUE green theraband HEP with S & 0 vcs for technique to increase UB strength to wheel w/c and transfers. Short term goal 4: Pt complete LE dressing with min A & LH AE prn to increase indep and safety within home environment. Long term goals  Time Frame for Long term goals : 2 weeks   Long term goal 1: Pt will complete standing tolerance x 4 minutes with 2 UE release and SBA to increase indep and safety with IADL tasks.    Long term goal 2: Pt will complete BADL routine with CGA including with AE PRN and min vc for safety to increase indep and safety within home environment.

## 2018-09-26 LAB
GLUCOSE BLD-MCNC: 100 MG/DL (ref 70–108)
GLUCOSE BLD-MCNC: 126 MG/DL (ref 70–108)
GLUCOSE BLD-MCNC: 143 MG/DL (ref 70–108)
GLUCOSE BLD-MCNC: 145 MG/DL (ref 70–108)
GLUCOSE BLD-MCNC: 69 MG/DL (ref 70–108)

## 2018-09-26 PROCEDURE — 1290000000 HC SEMI PRIVATE OTHER R&B

## 2018-09-26 PROCEDURE — 6370000000 HC RX 637 (ALT 250 FOR IP): Performed by: INTERNAL MEDICINE

## 2018-09-26 PROCEDURE — 2709999900 HC NON-CHARGEABLE SUPPLY

## 2018-09-26 PROCEDURE — 97530 THERAPEUTIC ACTIVITIES: CPT

## 2018-09-26 PROCEDURE — 2580000003 HC RX 258: Performed by: INTERNAL MEDICINE

## 2018-09-26 PROCEDURE — 82948 REAGENT STRIP/BLOOD GLUCOSE: CPT

## 2018-09-26 PROCEDURE — 97110 THERAPEUTIC EXERCISES: CPT

## 2018-09-26 PROCEDURE — 6370000000 HC RX 637 (ALT 250 FOR IP): Performed by: FAMILY MEDICINE

## 2018-09-26 PROCEDURE — 6360000002 HC RX W HCPCS: Performed by: INTERNAL MEDICINE

## 2018-09-26 RX ADMIN — TIZANIDINE 4 MG: 4 TABLET ORAL at 13:48

## 2018-09-26 RX ADMIN — ENOXAPARIN SODIUM 40 MG: 40 INJECTION SUBCUTANEOUS at 08:44

## 2018-09-26 RX ADMIN — Medication 18 UNITS: at 08:48

## 2018-09-26 RX ADMIN — HYOSCYAMINE SULFATE: 16 SOLUTION at 08:55

## 2018-09-26 RX ADMIN — Medication 10 ML: at 20:20

## 2018-09-26 RX ADMIN — TIZANIDINE 4 MG: 4 TABLET ORAL at 20:20

## 2018-09-26 RX ADMIN — FAMOTIDINE 20 MG: 20 TABLET ORAL at 08:44

## 2018-09-26 RX ADMIN — Medication 10 ML: at 08:49

## 2018-09-26 RX ADMIN — INSULIN GLARGINE 50 UNITS: 100 INJECTION, SOLUTION SUBCUTANEOUS at 08:49

## 2018-09-26 RX ADMIN — CALCIUM CARBONATE-VITAMIN D TAB 500 MG-200 UNIT 1 TABLET: 500-200 TAB at 20:20

## 2018-09-26 RX ADMIN — CALCIUM CARBONATE-VITAMIN D TAB 500 MG-200 UNIT 1 TABLET: 500-200 TAB at 08:44

## 2018-09-26 RX ADMIN — INSULIN GLARGINE 50 UNITS: 100 INJECTION, SOLUTION SUBCUTANEOUS at 20:24

## 2018-09-26 RX ADMIN — HYDROCODONE BITARTRATE AND ACETAMINOPHEN 2 TABLET: 5; 325 TABLET ORAL at 18:21

## 2018-09-26 RX ADMIN — Medication 18 UNITS: at 13:49

## 2018-09-26 RX ADMIN — TIZANIDINE 4 MG: 4 TABLET ORAL at 08:44

## 2018-09-26 RX ADMIN — Medication 18 UNITS: at 18:19

## 2018-09-26 RX ADMIN — TIZANIDINE 4 MG: 4 TABLET ORAL at 18:19

## 2018-09-26 RX ADMIN — Medication 1 CAPSULE: at 08:44

## 2018-09-26 RX ADMIN — CEFTRIAXONE SODIUM 2 G: 2 INJECTION, POWDER, FOR SOLUTION INTRAMUSCULAR; INTRAVENOUS at 18:19

## 2018-09-26 RX ADMIN — SERTRALINE 150 MG: 100 TABLET, FILM COATED ORAL at 08:44

## 2018-09-26 RX ADMIN — HYDROCODONE BITARTRATE AND ACETAMINOPHEN 2 TABLET: 5; 325 TABLET ORAL at 13:48

## 2018-09-26 RX ADMIN — MIRTAZAPINE 15 MG: 15 TABLET, FILM COATED ORAL at 20:20

## 2018-09-26 RX ADMIN — HYDROCODONE BITARTRATE AND ACETAMINOPHEN 2 TABLET: 5; 325 TABLET ORAL at 08:44

## 2018-09-26 ASSESSMENT — PAIN SCALES - GENERAL
PAINLEVEL_OUTOF10: 8
PAINLEVEL_OUTOF10: 6
PAINLEVEL_OUTOF10: 8
PAINLEVEL_OUTOF10: 6
PAINLEVEL_OUTOF10: 8
PAINLEVEL_OUTOF10: 8
PAINLEVEL_OUTOF10: 6

## 2018-09-26 ASSESSMENT — PAIN DESCRIPTION - DESCRIPTORS: DESCRIPTORS: ACHING;DISCOMFORT

## 2018-09-26 ASSESSMENT — PAIN DESCRIPTION - ONSET: ONSET: ON-GOING

## 2018-09-26 ASSESSMENT — PAIN DESCRIPTION - LOCATION: LOCATION: LEG

## 2018-09-26 ASSESSMENT — PAIN DESCRIPTION - ORIENTATION: ORIENTATION: RIGHT

## 2018-09-26 ASSESSMENT — PAIN DESCRIPTION - FREQUENCY: FREQUENCY: INTERMITTENT

## 2018-09-26 NOTE — PROGRESS NOTES
Physical Therapy   [address] [address]  INPATIENT PHYSICAL THERAPY  DAILYNOTE  [facility] 8E - 8E-68/068-A    Time In: 0915  Time Out: 1000  Timed Code Treatment Minutes: 45 Minutes  Minutes: 45          Date: 2018  Patient Name: Bro Lanier,  Gender:  male        MRN: [id number]  : 1970  (46 y.o.)     Referring Practitioner: Ordering: Abigail Cooper MD; Attending: D. Nicholes Boas, MD  Diagnosis: Physical Debility  Additional Pertinent Hx: Pt is a 45 y/o male admitted with sepsis secondary to right LE infected diabetic ulcer. No evidence for osteomyelitis. History of R BKA. Pt is s/p I&D of R LE ulcer.      Past Medical History:   Diagnosis Date    Bipolar 2 disorder ([address]     previously followed with Dr. Sunny Han and Constance Pisano in [facility] Diabetes mellitus type 2, uncontrolled ([address]     Diabetic foot ulcer ([address]     Diabetic polyneuropathy ([address]     Diabetic ulcer of right foot associated with type 2 diabetes mellitus ([address] 12/10/2015    Essential hypertension     \"never been on b/p medication that I know of\"    GERD (gastroesophageal reflux disease)     Hammer toe of left foot     Heart murmur     denies any chest pain or palpitations    History of tobacco abuse     Hx of BKA, right (HCC)     Macular edema, diabetic, bilateral ([address] 2018    Dr. 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 Right 2018 EXCISIONAL DEBRIDEMENT RIGHT BKA STUMP performed by Estle Gilford, MD at [address] [place] [address] Drive Right 1/16/15    2nd toe with wound vac applied    WISDOM TOOTH EXTRACTION  ? when       Restrictions/Precautions:  Fall Risk, General Precautions                    Other position/activity restrictions: R BKA with drainage from stump, don't use prosthesis until cleared by doctor       Prior Level of Function:  ADL Assistance: Independent  Homemaking Assistance: Independent  Ambulation Assistance: Independent  Transfer Assistance: Independent  Additional Comments: Pt reports that he lives at and helps with management at [facility] prior to admit, which he states he has been there the past year and plans on going back there. He has a prosthesis in which he states that he wears all the time and does not otherwise use an assistive device for mobility. Subjective:  Chart Reviewed: Yes  Subjective: Patient resting in bed upon arrival and agreeable to therapy. Patient reporting 6/10 pain in RLE. Pain:   .  Pain Assessment  Pain Level: 6       Social/Functional:  Lives With: Alone  Type of Home: Facility ([facility]- Pt is . )  Home Layout: Two level, Able to Live on Main level with bedroom/bathroom  Home Access: Ramped entrance  Home Equipment:  (Pt reports that he might have a wheelchair, but not sure.)     Objective:  Supine to Sit: Supervision (with use of bed rail )    Transfers  Sit to Stand: Stand by assistance (from various surfaces )  Stand to sit: Stand by assistance (to various surfaces )  Stand Pivot Transfers: Contact guard assistance (required CGA with transfer from bed to wheelchair and SBA with transfer from wheelchair to mat table.  Patient required verbal cues on proper use of walker )    WB Status: NWB R LE  Ambulation 1  Surface: level tile  Device: Standard Walker  Assistance: Contact guard assistance  Quality of Gait: decreased aiyana, decreased hop height, Patient would benefit from continued therapy to increase strength, ROM. balance, endurance, and functional mobility. Prognosis: Excellent     Discharge Recommendations: Continue to assess pending progress    Patient Education:  Patient Education: POC, transfers, safety, gait, W/C    Equipment Recommendations: Other: continue to assess - likely will need wheelchair and SW    Safety:  Type of devices: All fall risk precautions in place, Left in chair, Call light within reach, Chair alarm in place, Gait belt, Patient at risk for falls, Nurse notified    Plan:  Times per week: 6x  Times per day: Daily  Current Treatment Recommendations: Strengthening, Balance Training, Functional Mobility Training, Equipment Evaluation, Education, & procurement, Safety Education & Training, Transfer Training, Patient/Caregiver Education & Training, Endurance Training, Home Exercise Program, Wheelchair Mobility Training, Gait Training    Goals:  Patient goals : Go home    Short term goals  Time Frame for Short term goals: 2 weeks  Short term goal 1: Pt to be Mod I for supine <--> sit to get into/out of bed. Short term goal 2: Pt to be Mod I for sit <--> stand to get up to ambulate.   Short term goal 3: Pt to be Mod I for pivot transfer between surfaces for safety with mobility at home and community  Short term goal 4: Pt to ambulate 25 ft with SW with S for short navigation needs at home  Short term goal 5: patient to propel wheelchair community distances at mod I for functional mobility    Long term goals  Time Frame for Long term goals : No LTGs due to short ELOS

## 2018-09-27 LAB
GLUCOSE BLD-MCNC: 128 MG/DL (ref 70–108)
GLUCOSE BLD-MCNC: 135 MG/DL (ref 70–108)
GLUCOSE BLD-MCNC: 188 MG/DL (ref 70–108)
GLUCOSE BLD-MCNC: 92 MG/DL (ref 70–108)

## 2018-09-27 PROCEDURE — 1290000000 HC SEMI PRIVATE OTHER R&B

## 2018-09-27 PROCEDURE — 6370000000 HC RX 637 (ALT 250 FOR IP): Performed by: FAMILY MEDICINE

## 2018-09-27 PROCEDURE — 2580000003 HC RX 258: Performed by: INTERNAL MEDICINE

## 2018-09-27 PROCEDURE — 97530 THERAPEUTIC ACTIVITIES: CPT

## 2018-09-27 PROCEDURE — 6360000002 HC RX W HCPCS: Performed by: INTERNAL MEDICINE

## 2018-09-27 PROCEDURE — 6370000000 HC RX 637 (ALT 250 FOR IP): Performed by: INTERNAL MEDICINE

## 2018-09-27 PROCEDURE — 97542 WHEELCHAIR MNGMENT TRAINING: CPT

## 2018-09-27 PROCEDURE — APPSS30 APP SPLIT SHARED TIME 16-30 MINUTES: Performed by: NURSE PRACTITIONER

## 2018-09-27 PROCEDURE — 99024 POSTOP FOLLOW-UP VISIT: CPT | Performed by: NURSE PRACTITIONER

## 2018-09-27 PROCEDURE — 97110 THERAPEUTIC EXERCISES: CPT

## 2018-09-27 PROCEDURE — 2709999900 HC NON-CHARGEABLE SUPPLY

## 2018-09-27 PROCEDURE — 82948 REAGENT STRIP/BLOOD GLUCOSE: CPT

## 2018-09-27 RX ADMIN — INSULIN GLARGINE 50 UNITS: 100 INJECTION, SOLUTION SUBCUTANEOUS at 22:19

## 2018-09-27 RX ADMIN — ENOXAPARIN SODIUM 40 MG: 40 INJECTION SUBCUTANEOUS at 08:12

## 2018-09-27 RX ADMIN — CEFTRIAXONE SODIUM 2 G: 2 INJECTION, POWDER, FOR SOLUTION INTRAMUSCULAR; INTRAVENOUS at 15:12

## 2018-09-27 RX ADMIN — Medication 10 ML: at 22:15

## 2018-09-27 RX ADMIN — Medication 18 UNITS: at 12:41

## 2018-09-27 RX ADMIN — CALCIUM CARBONATE-VITAMIN D TAB 500 MG-200 UNIT 1 TABLET: 500-200 TAB at 22:15

## 2018-09-27 RX ADMIN — Medication 18 UNITS: at 17:06

## 2018-09-27 RX ADMIN — FAMOTIDINE 20 MG: 20 TABLET ORAL at 08:05

## 2018-09-27 RX ADMIN — TIZANIDINE 4 MG: 4 TABLET ORAL at 22:15

## 2018-09-27 RX ADMIN — TIZANIDINE 4 MG: 4 TABLET ORAL at 17:05

## 2018-09-27 RX ADMIN — HYDROCODONE BITARTRATE AND ACETAMINOPHEN 2 TABLET: 5; 325 TABLET ORAL at 22:15

## 2018-09-27 RX ADMIN — TIZANIDINE 4 MG: 4 TABLET ORAL at 08:05

## 2018-09-27 RX ADMIN — Medication 10 ML: at 08:49

## 2018-09-27 RX ADMIN — HYDROCODONE BITARTRATE AND ACETAMINOPHEN 2 TABLET: 5; 325 TABLET ORAL at 08:43

## 2018-09-27 RX ADMIN — CALCIUM CARBONATE-VITAMIN D TAB 500 MG-200 UNIT 1 TABLET: 500-200 TAB at 08:05

## 2018-09-27 RX ADMIN — HYDROCODONE BITARTRATE AND ACETAMINOPHEN 2 TABLET: 5; 325 TABLET ORAL at 15:11

## 2018-09-27 RX ADMIN — MIRTAZAPINE 15 MG: 15 TABLET, FILM COATED ORAL at 22:15

## 2018-09-27 RX ADMIN — Medication 1 CAPSULE: at 08:05

## 2018-09-27 RX ADMIN — INSULIN GLARGINE 50 UNITS: 100 INJECTION, SOLUTION SUBCUTANEOUS at 08:05

## 2018-09-27 RX ADMIN — HYOSCYAMINE SULFATE: 16 SOLUTION at 10:24

## 2018-09-27 RX ADMIN — TIZANIDINE 4 MG: 4 TABLET ORAL at 12:39

## 2018-09-27 RX ADMIN — Medication 18 UNITS: at 08:44

## 2018-09-27 RX ADMIN — SERTRALINE 150 MG: 100 TABLET, FILM COATED ORAL at 08:05

## 2018-09-27 ASSESSMENT — PAIN SCALES - GENERAL
PAINLEVEL_OUTOF10: 7
PAINLEVEL_OUTOF10: 7
PAINLEVEL_OUTOF10: 4
PAINLEVEL_OUTOF10: 4
PAINLEVEL_OUTOF10: 7
PAINLEVEL_OUTOF10: 8
PAINLEVEL_OUTOF10: 7
PAINLEVEL_OUTOF10: 5
PAINLEVEL_OUTOF10: 8

## 2018-09-27 ASSESSMENT — PAIN DESCRIPTION - PAIN TYPE
TYPE: ACUTE PAIN
TYPE: ACUTE PAIN
TYPE: SURGICAL PAIN

## 2018-09-27 ASSESSMENT — PAIN DESCRIPTION - ORIENTATION
ORIENTATION: RIGHT
ORIENTATION: RIGHT;POSTERIOR
ORIENTATION: RIGHT

## 2018-09-27 ASSESSMENT — PAIN DESCRIPTION - FREQUENCY: FREQUENCY: INTERMITTENT

## 2018-09-27 ASSESSMENT — PAIN DESCRIPTION - DESCRIPTORS: DESCRIPTORS: ACHING;DISCOMFORT

## 2018-09-27 ASSESSMENT — PAIN DESCRIPTION - ONSET: ONSET: ON-GOING

## 2018-09-27 ASSESSMENT — PAIN DESCRIPTION - LOCATION
LOCATION: LEG
LOCATION: LEG
LOCATION: KNEE

## 2018-09-27 NOTE — PLAN OF CARE
Problem: Falls - Risk of:  Goal: Will remain free from falls  Will remain free from falls   Outcome: Ongoing  Pt will remain free of falls call light and personal belongings within reach. Bed and chair alarm active. Will continue to hourly round on pt. Goal: Absence of physical injury  Absence of physical injury   Outcome: Ongoing  Pt remains absent of physical injury. Call light and personal belongings within reach. Will continue to hourly round. Problem: Risk for Impaired Skin Integrity  Goal: Tissue integrity - skin and mucous membranes  Structural intactness and normal physiological function of skin and  mucous membranes. Outcome: Ongoing  Skin and mucous membranes remain normal and intact. Wound is healing on right stump. Will continue to monitor. Problem: Pain:  Goal: Pain level will decrease  Pain level will decrease   Outcome: Ongoing  Pain level will continue to decrease. Pt taking oral medications for control of pain level. Goal: Control of acute pain  Control of acute pain   Outcome: Ongoing  Pt taking oral medications for acute pain. Goal: Control of chronic pain  Control of chronic pain   Outcome: Ongoing      Problem: Infection - Surgical Site:  Goal: Will show no infection signs and symptoms  Will show no infection signs and symptoms   Outcome: Ongoing  Pt presents with no signs or symptoms of infection. Will continue to monitor. Problem: Mobility - Impaired:  Goal: Mobility will improve  Mobility will improve   Outcome: Ongoing  Mobility will continue to improve as pt continues to progress throughout his therapy stay. Problem: Bleeding:  Goal: Will show no signs and symptoms of excessive bleeding  Will show no signs and symptoms of excessive bleeding   Outcome: Ongoing  Pt presents with no signs or symptoms of excessive bleeding.     Problem: Serum Glucose Level - Abnormal:  Goal: Ability to maintain appropriate glucose levels has stabilized  Ability to maintain appropriate glucose

## 2018-09-27 NOTE — PROGRESS NOTES
from eob, mat however mod a of 1 and min a of 1 from std. ht toilet seat)  Stand to sit: Stand by assistance  Bed to Chair: Stand by assistance  Stand Pivot Transfers: Contact guard assistance (cga from toilet seat)    Toilet transfer without ad, low toilet seat, with cga->min a stand to sit mod a of 1 and min a of 1 sit to stand, dependent with hygiene following a bm and assist needed with clothing management cga with stand pivot transfer from toilet seat-> w/c       Ambulation 1  Surface: level tile  Device: Parallel Bars  Assistance: Stand by assistance  Quality of Gait: decreased aiyana, decreased hop height, unsteady, patient refusing to attempt further distance due to increased fatigue  Distance: 8' x 1  Comments: cues for safety awareness and to focus on consistency and fluidity of gait not velocity. Wheelchair Activities  Propulsion: Yes  Propulsion 1  Propulsion: Manual  Level: Level Tile  Method: RUE;LUE;LLE  Level of Assistance: Supervision  Description/ Details:  rather fast pace  good stroke length, pt fatigued quickly and needed 1  rest break   Distance: 50' x 2       Exercises:  Exercises  Comments:  standing in // bars with b ue support, Patient completed  LE therapeutic exercises 15x each with the performance of  L LE heel/toe raises, R LE hip abduction/hip adduction, R LE hip flexion/extension, R LE marching hip flexion, L LE  partial squats( x 5 reps only)  and supine on mat 15 reps each L LE ap's, b le quad/glute sets, pt. then reported he had to go to the bathroom for a BM, therefore pt. transported back to his room via w/c for toileting. all ther/. ex  completed for strength/endurance for improvement with functional mobility. Activity Tolerance:  Activity Tolerance: Patient limited by fatigue;Patient limited by pain; Patient limited by endurance  Activity Tolerance: pt. very quiet throughout session.  co-operative however showed decreased motivation    Assessment:     Discharge Recommendations: Continue to assess pending progress    Patient Education:  Patient Education: POC, transfers, safety, gait, W/C    Equipment Recommendations: Other: continue to assess - likely will need wheelchair and SW    Safety:  Type of devices: All fall risk precautions in place, Patient at risk for falls, Call light within reach, Left in chair, Chair alarm in place, Gait belt (breakfast arrived)    Plan:  Times per week: 6x  Times per day: Daily  Current Treatment Recommendations: Strengthening, Balance Training, Functional Mobility Training, Equipment Evaluation, Education, & procurement, Safety Education & Training, Transfer Training, Patient/Caregiver Education & Training, Endurance Training, Home Exercise Program, Wheelchair Mobility Training, Gait Training    Goals:  Patient goals : Go home    Short term goals  Time Frame for Short term goals: 2 weeks  Short term goal 1: Pt to be Mod I for supine <--> sit to get into/out of bed. Short term goal 2: Pt to be Mod I for sit <--> stand to get up to ambulate.   Short term goal 3: Pt to be Mod I for pivot transfer between surfaces for safety with mobility at home and community  Short term goal 4: Pt to ambulate 25 ft with SW with S for short navigation needs at home  Short term goal 5: patient to propel wheelchair community distances at mod I for functional mobility    Long term goals  Time Frame for Long term goals : No LTGs due to short ELOS

## 2018-09-27 NOTE — PROGRESS NOTES
UE release and SBA to increase indep and safety with IADL tasks. Long term goal 2: Pt will complete BADL routine with CGA including with AE PRN and min vc for safety to increase indep and safety within home environment.

## 2018-09-27 NOTE — PROGRESS NOTES
Patient Name: Natasha Wu        MRN: [id number]    : 1970  (46 y.o.)  Gender: male   Principal Problem: <principal problem not specified>    Section D - Mood     Should Resident Mood Interview be Conducted?  Attempt to conduct interview with all residents   0. No (resident is rarely/never understood) à Skip to and complete -, Staff Assessment of Mood (PHQ 9-OV)  1. Yes à Continue to , Resident Mood Interview (PHQ-9) Enter Code    1   . Resident Mood Interview (PHQ-9)   Say to resident: Mac Martínez the last 2 weeks, have you been bothered by any of the following problems?    If symptom is present, enter 1(yes) in column 1, Symptom Presence. Then move to column 2, Symptom Frequency, and indicate symptom frequency. 1. Symptom Presence                   2. Symptom                                                                  Frequency  0. No (enter 0 in column 2)          0. Never or 1 day          1. Yes (enter 0-3 in column 2)      1. 2-6 days           9. No Response                               2.  7-11 days                                                 3.  12-14 days   1. Symptom Presence 2. Symptom  Frequency        Enter Scores in boxes below   A. Little interest or pleasure in doing things 0 0   B. Feeling down, depressed, or hopeless 1 1   C. Trouble falling or staying asleep, or sleeping too much 1 1   D. Feeling tired or having little energy 1 1   E. Poor appetite or overeating 1 1   F. Feeling bad about yourself  or that you are a failure, or have let your family down 1 1   G. Trouble concentrating on things, such as reading the newspaper or watching television 0 0   H. Moving or speaking so slowly that other people have noticed. Or the opposite-being so fidgety or restless that s/he has been moving around a lot more than usual   0   0   I. Thoughts that you would be better off dead, or of hurting yourself in some way.  0 0              Patient Name: Jocelinepam Laurie

## 2018-09-28 ENCOUNTER — APPOINTMENT (OUTPATIENT)
Dept: INTERVENTIONAL RADIOLOGY/VASCULAR | Age: 48
DRG: 566 | End: 2018-09-28
Attending: FAMILY MEDICINE
Payer: MEDICARE

## 2018-09-28 LAB
GLUCOSE BLD-MCNC: 115 MG/DL (ref 70–108)
GLUCOSE BLD-MCNC: 161 MG/DL (ref 70–108)
GLUCOSE BLD-MCNC: 161 MG/DL (ref 70–108)
GLUCOSE BLD-MCNC: 210 MG/DL (ref 70–108)

## 2018-09-28 PROCEDURE — 6370000000 HC RX 637 (ALT 250 FOR IP): Performed by: FAMILY MEDICINE

## 2018-09-28 PROCEDURE — 6360000002 HC RX W HCPCS: Performed by: INTERNAL MEDICINE

## 2018-09-28 PROCEDURE — 2709999900 HC NON-CHARGEABLE SUPPLY

## 2018-09-28 PROCEDURE — 2580000003 HC RX 258: Performed by: INTERNAL MEDICINE

## 2018-09-28 PROCEDURE — 82948 REAGENT STRIP/BLOOD GLUCOSE: CPT

## 2018-09-28 PROCEDURE — 0220000000 HC SKILLED NURSING FACILITY

## 2018-09-28 PROCEDURE — 6370000000 HC RX 637 (ALT 250 FOR IP): Performed by: NURSE PRACTITIONER

## 2018-09-28 PROCEDURE — 97110 THERAPEUTIC EXERCISES: CPT

## 2018-09-28 PROCEDURE — 97116 GAIT TRAINING THERAPY: CPT

## 2018-09-28 PROCEDURE — 97542 WHEELCHAIR MNGMENT TRAINING: CPT

## 2018-09-28 PROCEDURE — 93971 EXTREMITY STUDY: CPT

## 2018-09-28 PROCEDURE — 97535 SELF CARE MNGMENT TRAINING: CPT

## 2018-09-28 PROCEDURE — 97530 THERAPEUTIC ACTIVITIES: CPT

## 2018-09-28 PROCEDURE — 1290000000 HC SEMI PRIVATE OTHER R&B

## 2018-09-28 PROCEDURE — 6370000000 HC RX 637 (ALT 250 FOR IP): Performed by: INTERNAL MEDICINE

## 2018-09-28 RX ADMIN — SERTRALINE 150 MG: 100 TABLET, FILM COATED ORAL at 08:17

## 2018-09-28 RX ADMIN — HYDROCODONE BITARTRATE AND ACETAMINOPHEN 2 TABLET: 5; 325 TABLET ORAL at 13:05

## 2018-09-28 RX ADMIN — FAMOTIDINE 20 MG: 20 TABLET ORAL at 08:17

## 2018-09-28 RX ADMIN — ENOXAPARIN SODIUM 40 MG: 40 INJECTION SUBCUTANEOUS at 08:33

## 2018-09-28 RX ADMIN — HYOSCYAMINE SULFATE: 16 SOLUTION at 13:11

## 2018-09-28 RX ADMIN — HYDROCODONE BITARTRATE AND ACETAMINOPHEN 2 TABLET: 5; 325 TABLET ORAL at 08:18

## 2018-09-28 RX ADMIN — Medication 18 UNITS: at 18:39

## 2018-09-28 RX ADMIN — MIRTAZAPINE 15 MG: 15 TABLET, FILM COATED ORAL at 21:24

## 2018-09-28 RX ADMIN — INSULIN GLARGINE 50 UNITS: 100 INJECTION, SOLUTION SUBCUTANEOUS at 08:19

## 2018-09-28 RX ADMIN — TIZANIDINE 4 MG: 4 TABLET ORAL at 13:05

## 2018-09-28 RX ADMIN — Medication 18 UNITS: at 08:19

## 2018-09-28 RX ADMIN — HYDROCODONE BITARTRATE AND ACETAMINOPHEN 2 TABLET: 5; 325 TABLET ORAL at 17:08

## 2018-09-28 RX ADMIN — TIZANIDINE 4 MG: 4 TABLET ORAL at 21:24

## 2018-09-28 RX ADMIN — CALCIUM CARBONATE-VITAMIN D TAB 500 MG-200 UNIT 1 TABLET: 500-200 TAB at 21:24

## 2018-09-28 RX ADMIN — TIZANIDINE 4 MG: 4 TABLET ORAL at 17:10

## 2018-09-28 RX ADMIN — Medication 10 ML: at 08:33

## 2018-09-28 RX ADMIN — CALCIUM CARBONATE-VITAMIN D TAB 500 MG-200 UNIT 1 TABLET: 500-200 TAB at 08:17

## 2018-09-28 RX ADMIN — Medication 1 CAPSULE: at 08:17

## 2018-09-28 RX ADMIN — TIZANIDINE 4 MG: 4 TABLET ORAL at 08:18

## 2018-09-28 RX ADMIN — INSULIN GLARGINE 50 UNITS: 100 INJECTION, SOLUTION SUBCUTANEOUS at 21:24

## 2018-09-28 RX ADMIN — Medication 18 UNITS: at 13:08

## 2018-09-28 RX ADMIN — CEFTRIAXONE SODIUM 2 G: 2 INJECTION, POWDER, FOR SOLUTION INTRAMUSCULAR; INTRAVENOUS at 17:46

## 2018-09-28 ASSESSMENT — PAIN DESCRIPTION - LOCATION
LOCATION: LEG
LOCATION: KNEE;LEG
LOCATION: KNEE;LEG

## 2018-09-28 ASSESSMENT — PAIN SCALES - GENERAL
PAINLEVEL_OUTOF10: 9
PAINLEVEL_OUTOF10: 10
PAINLEVEL_OUTOF10: 9
PAINLEVEL_OUTOF10: 8
PAINLEVEL_OUTOF10: 9
PAINLEVEL_OUTOF10: 9

## 2018-09-28 ASSESSMENT — PAIN DESCRIPTION - DESCRIPTORS: DESCRIPTORS: ACHING;ITCHING;SORE

## 2018-09-28 ASSESSMENT — PAIN DESCRIPTION - PAIN TYPE
TYPE: SURGICAL PAIN
TYPE: SURGICAL PAIN

## 2018-09-28 ASSESSMENT — PAIN DESCRIPTION - FREQUENCY: FREQUENCY: CONTINUOUS

## 2018-09-28 ASSESSMENT — PAIN DESCRIPTION - PROGRESSION: CLINICAL_PROGRESSION: NOT CHANGED

## 2018-09-28 ASSESSMENT — PAIN DESCRIPTION - ORIENTATION: ORIENTATION: RIGHT;POSTERIOR

## 2018-09-28 ASSESSMENT — PAIN DESCRIPTION - ONSET: ONSET: ON-GOING

## 2018-09-28 NOTE — PROGRESS NOTES
[address] [address]  INPATIENT PHYSICAL THERAPY  DAILY NOTE  [facility] TCU 8E - 8E-68/068-A    Time In: 5361  Time Out: 0163  Timed Code Treatment Minutes: 48 Minutes  Minutes: 48          Date: 2018  Patient Name: Ivonne Malik,  Gender:  male        MRN: [id number]  : 1970  (46 y.o.)     Referring Practitioner: Ordering: Juan Grigsby MD; Attending: LIDIA Weller MD  Diagnosis: Physical Debility  Additional Pertinent Hx: Pt is a 47 y/o male admitted with sepsis secondary to right LE infected diabetic ulcer. No evidence for osteomyelitis. History of R BKA. Pt is s/p I&D of R LE ulcer.      Past Medical History:   Diagnosis Date    Bipolar 2 disorder ([address]     previously followed with Dr. Lennie Yao and  Alycia in [facility] Diabetes mellitus type 2, uncontrolled ([address]     Diabetic foot ulcer ([address]     Diabetic polyneuropathy ([address]     Diabetic ulcer of right foot associated with type 2 diabetes mellitus ([address] 12/10/2015    Essential hypertension     \"never been on b/p medication that I know of\"    GERD (gastroesophageal reflux disease)     Hammer toe of left foot     Heart murmur     denies any chest pain or palpitations    History of tobacco abuse     Hx of BKA, right (HCC)     Macular edema, diabetic, bilateral ([address] 2018    Dr. Faith Jiang referred to retina specialist for 2nd opinion    Marijuana abuse in remission     Onychomycosis     WD-Skin ulcer of fourth toe of right foot with necrosis of bone ([address] 2016     Past Surgical History:   Procedure Laterality Date    ABSCESS DRAINAGE Right     foot    FOOT DEBRIDEMENT Right 2016    I & D    LEG AMPUTATION BELOW KNEE Right 2016    OTHER SURGICAL HISTORY Right 1/14/15    sole of foot I&D    [place] DRAIN INFECT SHOULDER BURSA Left 2017    LEFT SHOULDER INCISION AND DRAINAGE performed by Piotr Roberson MD at [address] OFFICE/OUTPT [address] [facility] Right 2018    EXCISIONAL

## 2018-09-29 LAB
GLUCOSE BLD-MCNC: 125 MG/DL (ref 70–108)
GLUCOSE BLD-MCNC: 143 MG/DL (ref 70–108)
GLUCOSE BLD-MCNC: 167 MG/DL (ref 70–108)
GLUCOSE BLD-MCNC: 209 MG/DL (ref 70–108)

## 2018-09-29 PROCEDURE — 6370000000 HC RX 637 (ALT 250 FOR IP): Performed by: INTERNAL MEDICINE

## 2018-09-29 PROCEDURE — 6360000002 HC RX W HCPCS: Performed by: INTERNAL MEDICINE

## 2018-09-29 PROCEDURE — 6370000000 HC RX 637 (ALT 250 FOR IP): Performed by: FAMILY MEDICINE

## 2018-09-29 PROCEDURE — 2709999900 HC NON-CHARGEABLE SUPPLY

## 2018-09-29 PROCEDURE — 2580000003 HC RX 258: Performed by: INTERNAL MEDICINE

## 2018-09-29 PROCEDURE — 82948 REAGENT STRIP/BLOOD GLUCOSE: CPT

## 2018-09-29 PROCEDURE — 6370000000 HC RX 637 (ALT 250 FOR IP): Performed by: NURSE PRACTITIONER

## 2018-09-29 PROCEDURE — 2500000003 HC RX 250 WO HCPCS: Performed by: INTERNAL MEDICINE

## 2018-09-29 PROCEDURE — 1290000000 HC SEMI PRIVATE OTHER R&B

## 2018-09-29 RX ADMIN — CEFTRIAXONE SODIUM 2 G: 2 INJECTION, POWDER, FOR SOLUTION INTRAMUSCULAR; INTRAVENOUS at 15:36

## 2018-09-29 RX ADMIN — HYOSCYAMINE SULFATE: 16 SOLUTION at 09:30

## 2018-09-29 RX ADMIN — FAMOTIDINE 20 MG: 20 TABLET ORAL at 09:17

## 2018-09-29 RX ADMIN — TRAMADOL HYDROCHLORIDE 50 MG: 50 TABLET, FILM COATED ORAL at 20:36

## 2018-09-29 RX ADMIN — TIZANIDINE 4 MG: 4 TABLET ORAL at 09:17

## 2018-09-29 RX ADMIN — Medication 1 CAPSULE: at 09:19

## 2018-09-29 RX ADMIN — DOCUSATE SODIUM 100 MG: 100 CAPSULE, LIQUID FILLED ORAL at 09:17

## 2018-09-29 RX ADMIN — CALCIUM CARBONATE-VITAMIN D TAB 500 MG-200 UNIT 1 TABLET: 500-200 TAB at 20:36

## 2018-09-29 RX ADMIN — ENOXAPARIN SODIUM 40 MG: 40 INJECTION SUBCUTANEOUS at 11:17

## 2018-09-29 RX ADMIN — TIZANIDINE 4 MG: 4 TABLET ORAL at 20:36

## 2018-09-29 RX ADMIN — INSULIN GLARGINE 50 UNITS: 100 INJECTION, SOLUTION SUBCUTANEOUS at 20:35

## 2018-09-29 RX ADMIN — Medication 18 UNITS: at 09:22

## 2018-09-29 RX ADMIN — TIZANIDINE 4 MG: 4 TABLET ORAL at 17:41

## 2018-09-29 RX ADMIN — TIZANIDINE 4 MG: 4 TABLET ORAL at 13:09

## 2018-09-29 RX ADMIN — INSULIN GLARGINE 50 UNITS: 100 INJECTION, SOLUTION SUBCUTANEOUS at 11:17

## 2018-09-29 RX ADMIN — CALCIUM CARBONATE-VITAMIN D TAB 500 MG-200 UNIT 1 TABLET: 500-200 TAB at 09:17

## 2018-09-29 RX ADMIN — MIRTAZAPINE 15 MG: 15 TABLET, FILM COATED ORAL at 20:36

## 2018-09-29 RX ADMIN — Medication 18 UNITS: at 17:42

## 2018-09-29 RX ADMIN — SERTRALINE 150 MG: 100 TABLET, FILM COATED ORAL at 09:18

## 2018-09-29 RX ADMIN — HYDROCODONE BITARTRATE AND ACETAMINOPHEN 2 TABLET: 5; 325 TABLET ORAL at 09:19

## 2018-09-29 RX ADMIN — Medication 5 UNITS: at 17:41

## 2018-09-29 RX ADMIN — Medication 18 UNITS: at 13:04

## 2018-09-29 ASSESSMENT — PAIN DESCRIPTION - ONSET: ONSET: ON-GOING

## 2018-09-29 ASSESSMENT — PAIN DESCRIPTION - DESCRIPTORS: DESCRIPTORS: ACHING

## 2018-09-29 ASSESSMENT — PAIN DESCRIPTION - FREQUENCY: FREQUENCY: CONTINUOUS

## 2018-09-29 ASSESSMENT — PAIN SCALES - GENERAL
PAINLEVEL_OUTOF10: 9
PAINLEVEL_OUTOF10: 9
PAINLEVEL_OUTOF10: 0
PAINLEVEL_OUTOF10: 7
PAINLEVEL_OUTOF10: 0

## 2018-09-29 ASSESSMENT — PAIN DESCRIPTION - LOCATION: LOCATION: LEG

## 2018-09-29 ASSESSMENT — PAIN DESCRIPTION - PROGRESSION: CLINICAL_PROGRESSION: NOT CHANGED

## 2018-09-29 ASSESSMENT — PAIN DESCRIPTION - ORIENTATION: ORIENTATION: RIGHT

## 2018-09-29 ASSESSMENT — PAIN DESCRIPTION - PAIN TYPE: TYPE: SURGICAL PAIN

## 2018-09-29 NOTE — PROGRESS NOTES
acetaminophen, magnesium hydroxide, magnesium sulfate, potassium chloride **OR** potassium chloride **OR** potassium chloride, potassium chloride, sodium chloride flush, dextrose, dextrose, glucagon (rDNA), glucose, acetaminophen, traMADol, polyethylene glycol, ondansetron    Review of Systems  Pertinent items are noted in HPI. Objective:     No data found. I/O last 3 completed shifts: In: 240 [P.O.:240]  Out: -   No intake/output data recorded. /69   Pulse 110   Temp 98 °F (36.7 °C) (Oral)   Resp 18   Ht 5' 5.98\" (1.676 m)   Wt 227 lb 8 oz (103.2 kg)   SpO2 98%   BMI 36.74 kg/m²     /69   Pulse 110   Temp 98 °F (36.7 °C) (Oral)   Resp 18   Ht 5' 5.98\" (1.676 m)   Wt 227 lb 8 oz (103.2 kg)   SpO2 98%   BMI 36.74 kg/m²   General appearance: alert, appears stated age and cooperative  Head: Normocephalic, without obvious abnormality, atraumatic  Lungs: clear to auscultation bilaterally  Heart: regular rate and rhythm, S1, S2 normal, no murmur, click, rub or gallop  Abdomen: soft, non-tender; bowel sounds normal; no masses,  no organomegaly  Extremities: dressing on right stump. no redness to leg seen  Skin: Skin color, texture, turgor normal. No rashes or lesions  Neurologic: Grossly normal    Data Review:   Results for Marcelo Vasquez (MRN [id number]) as of 9/29/2018 18:05   Ref.  Range 9/28/2018 07:57 9/28/2018 11:32 9/28/2018 15:50 9/28/2018 17:14 9/28/2018 21:16   POC Glucose Latest Ref Range: 70 - 108 mg/dl 161 (H) 161 (H)  115 (H) 210 (H)       Electronically signed by Carmen Villegas MD on 9/29/2018 at 6:03 PM

## 2018-09-30 LAB
GLUCOSE BLD-MCNC: 206 MG/DL (ref 70–108)
GLUCOSE BLD-MCNC: 253 MG/DL (ref 70–108)
GLUCOSE BLD-MCNC: 284 MG/DL (ref 70–108)
GLUCOSE BLD-MCNC: 351 MG/DL (ref 70–108)

## 2018-09-30 PROCEDURE — 6360000002 HC RX W HCPCS: Performed by: INTERNAL MEDICINE

## 2018-09-30 PROCEDURE — 2709999900 HC NON-CHARGEABLE SUPPLY

## 2018-09-30 PROCEDURE — 2580000003 HC RX 258: Performed by: INTERNAL MEDICINE

## 2018-09-30 PROCEDURE — 6370000000 HC RX 637 (ALT 250 FOR IP): Performed by: INTERNAL MEDICINE

## 2018-09-30 PROCEDURE — 1290000000 HC SEMI PRIVATE OTHER R&B

## 2018-09-30 PROCEDURE — 82948 REAGENT STRIP/BLOOD GLUCOSE: CPT

## 2018-09-30 PROCEDURE — 97542 WHEELCHAIR MNGMENT TRAINING: CPT

## 2018-09-30 PROCEDURE — 97530 THERAPEUTIC ACTIVITIES: CPT

## 2018-09-30 PROCEDURE — 97110 THERAPEUTIC EXERCISES: CPT

## 2018-09-30 PROCEDURE — 6370000000 HC RX 637 (ALT 250 FOR IP): Performed by: FAMILY MEDICINE

## 2018-09-30 RX ORDER — INSULIN GLARGINE 100 [IU]/ML
52 INJECTION, SOLUTION SUBCUTANEOUS 2 TIMES DAILY
Status: DISCONTINUED | OUTPATIENT
Start: 2018-09-30 | End: 2018-10-04 | Stop reason: HOSPADM

## 2018-09-30 RX ADMIN — CEFTRIAXONE SODIUM 2 G: 2 INJECTION, POWDER, FOR SOLUTION INTRAMUSCULAR; INTRAVENOUS at 15:25

## 2018-09-30 RX ADMIN — SERTRALINE 150 MG: 100 TABLET, FILM COATED ORAL at 09:42

## 2018-09-30 RX ADMIN — CALCIUM CARBONATE-VITAMIN D TAB 500 MG-200 UNIT 1 TABLET: 500-200 TAB at 21:37

## 2018-09-30 RX ADMIN — INSULIN GLARGINE 52 UNITS: 100 INJECTION, SOLUTION SUBCUTANEOUS at 21:36

## 2018-09-30 RX ADMIN — Medication 18 UNITS: at 10:02

## 2018-09-30 RX ADMIN — ENOXAPARIN SODIUM 40 MG: 40 INJECTION SUBCUTANEOUS at 09:45

## 2018-09-30 RX ADMIN — TIZANIDINE 4 MG: 4 TABLET ORAL at 18:01

## 2018-09-30 RX ADMIN — CALCIUM CARBONATE-VITAMIN D TAB 500 MG-200 UNIT 1 TABLET: 500-200 TAB at 09:42

## 2018-09-30 RX ADMIN — MIRTAZAPINE 15 MG: 15 TABLET, FILM COATED ORAL at 21:37

## 2018-09-30 RX ADMIN — FAMOTIDINE 20 MG: 20 TABLET ORAL at 09:42

## 2018-09-30 RX ADMIN — TIZANIDINE 4 MG: 4 TABLET ORAL at 21:37

## 2018-09-30 RX ADMIN — Medication 18 UNITS: at 18:06

## 2018-09-30 RX ADMIN — TIZANIDINE 4 MG: 4 TABLET ORAL at 09:42

## 2018-09-30 RX ADMIN — TRAMADOL HYDROCHLORIDE 50 MG: 50 TABLET, FILM COATED ORAL at 09:43

## 2018-09-30 RX ADMIN — Medication 18 UNITS: at 13:11

## 2018-09-30 RX ADMIN — Medication 1 CAPSULE: at 09:42

## 2018-09-30 RX ADMIN — TIZANIDINE 4 MG: 4 TABLET ORAL at 13:12

## 2018-09-30 RX ADMIN — Medication 10 ML: at 21:41

## 2018-09-30 RX ADMIN — INSULIN GLARGINE 50 UNITS: 100 INJECTION, SOLUTION SUBCUTANEOUS at 10:03

## 2018-09-30 RX ADMIN — HYOSCYAMINE SULFATE: 16 SOLUTION at 10:08

## 2018-09-30 ASSESSMENT — PAIN DESCRIPTION - PAIN TYPE
TYPE: ACUTE PAIN
TYPE: ACUTE PAIN

## 2018-09-30 ASSESSMENT — PAIN SCALES - GENERAL
PAINLEVEL_OUTOF10: 8
PAINLEVEL_OUTOF10: 8
PAINLEVEL_OUTOF10: 0

## 2018-09-30 ASSESSMENT — PAIN DESCRIPTION - ONSET: ONSET: ON-GOING

## 2018-09-30 ASSESSMENT — PAIN DESCRIPTION - ORIENTATION
ORIENTATION: RIGHT
ORIENTATION: RIGHT

## 2018-09-30 ASSESSMENT — PAIN DESCRIPTION - LOCATION
LOCATION: LEG
LOCATION: LEG

## 2018-09-30 ASSESSMENT — PAIN DESCRIPTION - FREQUENCY: FREQUENCY: INTERMITTENT

## 2018-09-30 ASSESSMENT — PAIN DESCRIPTION - PROGRESSION: CLINICAL_PROGRESSION: NOT CHANGED

## 2018-10-01 LAB
GLUCOSE BLD-MCNC: 153 MG/DL (ref 70–108)
GLUCOSE BLD-MCNC: 189 MG/DL (ref 70–108)
GLUCOSE BLD-MCNC: 200 MG/DL (ref 70–108)

## 2018-10-01 PROCEDURE — 1290000000 HC SEMI PRIVATE OTHER R&B

## 2018-10-01 PROCEDURE — 6370000000 HC RX 637 (ALT 250 FOR IP): Performed by: FAMILY MEDICINE

## 2018-10-01 PROCEDURE — 97110 THERAPEUTIC EXERCISES: CPT

## 2018-10-01 PROCEDURE — 2580000003 HC RX 258: Performed by: INTERNAL MEDICINE

## 2018-10-01 PROCEDURE — 6370000000 HC RX 637 (ALT 250 FOR IP): Performed by: INTERNAL MEDICINE

## 2018-10-01 PROCEDURE — 6360000002 HC RX W HCPCS: Performed by: INTERNAL MEDICINE

## 2018-10-01 PROCEDURE — 82948 REAGENT STRIP/BLOOD GLUCOSE: CPT

## 2018-10-01 PROCEDURE — 97530 THERAPEUTIC ACTIVITIES: CPT

## 2018-10-01 PROCEDURE — 6370000000 HC RX 637 (ALT 250 FOR IP): Performed by: NURSE PRACTITIONER

## 2018-10-01 PROCEDURE — 97542 WHEELCHAIR MNGMENT TRAINING: CPT

## 2018-10-01 PROCEDURE — 97535 SELF CARE MNGMENT TRAINING: CPT

## 2018-10-01 PROCEDURE — 2709999900 HC NON-CHARGEABLE SUPPLY

## 2018-10-01 RX ADMIN — CALCIUM CARBONATE-VITAMIN D TAB 500 MG-200 UNIT 1 TABLET: 500-200 TAB at 09:33

## 2018-10-01 RX ADMIN — Medication 10 ML: at 09:36

## 2018-10-01 RX ADMIN — CEFTRIAXONE SODIUM 2 G: 2 INJECTION, POWDER, FOR SOLUTION INTRAMUSCULAR; INTRAVENOUS at 15:11

## 2018-10-01 RX ADMIN — SERTRALINE 150 MG: 100 TABLET, FILM COATED ORAL at 09:34

## 2018-10-01 RX ADMIN — Medication 18 UNITS: at 09:41

## 2018-10-01 RX ADMIN — Medication 18 UNITS: at 17:29

## 2018-10-01 RX ADMIN — TIZANIDINE 4 MG: 4 TABLET ORAL at 17:29

## 2018-10-01 RX ADMIN — INSULIN GLARGINE 52 UNITS: 100 INJECTION, SOLUTION SUBCUTANEOUS at 09:41

## 2018-10-01 RX ADMIN — TIZANIDINE 4 MG: 4 TABLET ORAL at 23:56

## 2018-10-01 RX ADMIN — FAMOTIDINE 20 MG: 20 TABLET ORAL at 09:34

## 2018-10-01 RX ADMIN — ENOXAPARIN SODIUM 40 MG: 40 INJECTION SUBCUTANEOUS at 09:33

## 2018-10-01 RX ADMIN — Medication 1 CAPSULE: at 09:34

## 2018-10-01 RX ADMIN — Medication 10 ML: at 23:59

## 2018-10-01 RX ADMIN — DOCUSATE SODIUM 100 MG: 100 CAPSULE, LIQUID FILLED ORAL at 09:35

## 2018-10-01 RX ADMIN — INSULIN GLARGINE 52 UNITS: 100 INJECTION, SOLUTION SUBCUTANEOUS at 23:55

## 2018-10-01 RX ADMIN — ACETAMINOPHEN 650 MG: 325 TABLET ORAL at 09:34

## 2018-10-01 RX ADMIN — POTASSIUM CHLORIDE 40 MEQ: 20 TABLET, EXTENDED RELEASE ORAL at 09:33

## 2018-10-01 RX ADMIN — TIZANIDINE 4 MG: 4 TABLET ORAL at 12:41

## 2018-10-01 RX ADMIN — Medication 18 UNITS: at 12:41

## 2018-10-01 RX ADMIN — CALCIUM CARBONATE-VITAMIN D TAB 500 MG-200 UNIT 1 TABLET: 500-200 TAB at 23:56

## 2018-10-01 RX ADMIN — HYOSCYAMINE SULFATE: 16 SOLUTION at 09:36

## 2018-10-01 RX ADMIN — MIRTAZAPINE 15 MG: 15 TABLET, FILM COATED ORAL at 23:56

## 2018-10-01 RX ADMIN — TIZANIDINE 4 MG: 4 TABLET ORAL at 09:35

## 2018-10-01 ASSESSMENT — PAIN SCALES - GENERAL
PAINLEVEL_OUTOF10: 0
PAINLEVEL_OUTOF10: 1
PAINLEVEL_OUTOF10: 0

## 2018-10-01 NOTE — PROGRESS NOTES
tile  Device: Standard Walker  Other Apparatus: Wheelchair follow  Assistance: Contact guard assistance  Quality of Gait: decreased aiyana, decreased hop height, , patient refusing to attempt further distance due to increased fatigue\"have the wheelchair behind me\"  Distance: 10' x 1  Comments: cues for safety awareness and to focus on consistency and fluidity of gait not velocity. pt. impulsive at times. not much motivation  PT Equipment Recommendations  Other: continue to assess - likely will need wheelchair and SW    Occupational  Therapy:   ADL  Feeding: Independent  Grooming: Setup, Contact guard assistance (standing to use mouth wash )  LE Bathing: Maximum assistance  UE Dressing: Setup (Changed shirt x 2 events during session)  LE Dressing: Stand by assistance (to don and doff slipper sock on LLE sitting in w/c )  Toileting: Maximum assistance (For clothing management. )  Additional Comments: pt delcined to pracitice yosi Martins stating he can do it himself . Light Housekeeping  Light Housekeeping Level: Wheelchair  Light Housekeeping Level of Assistance: Contact guard assistance  Light Housekeeping: pt part in standing at washer from w/c to load washer of his clothing. Pt demo fair balance with leaning against washer for balance. Pt able to complete task with 1 hand release from washer. Speech Therapy:       Nutrition:    Weight: 219 lb 6.4 oz (99.5 kg) / Body mass index is 35.43 kg/m². Please see nutrition note for details.     Family Education: Need to make contact with family to initiate education  Fall Risk:  Falling star program initiated    Goal Achievement:   Patient Continues to progress toward goal achievement    Discharge Plan   Estimated Length of Stay: 10/4/18  Destination: discharge home with supervision  Services at Discharge: [facility], Occupational Therapy and Nursing 3x week  Equipment at Discharge: Needs: Chevy Martins, wheelchair, walker with or without walker  Factors

## 2018-10-01 NOTE — PROGRESS NOTES
[address]  INPATIENT OCCUPATIONAL THERAPY  [facility] TCU 8E  DAILY NOTE    Time:  Time In:   Time Out: 1415  Timed Code Treatment Minutes: 52 Minutes  Minutes: 49          Date: 10/1/2018  Patient Name: Aishwarya Hernandez,   Gender: male      Room: -68/068-A  MRN: [id number]  : 1970  (46 y.o.)  Referring Practitioner: Ordering: Melisa Weiss MD Attending: Dr. Barry Bailey  Diagnosis: Physical Debility  Additional Pertinent Hx: 45 y/o male admitted with sepsis secondary to right LE infected diabetic ulcer. No evidence for osteomyelitis. History of R BKA. Admitted to TCU on 18.      Past Medical History:   Diagnosis Date    Bipolar 2 disorder ([address]     previously followed with Dr. Luisa Sicard and Rupal Rodriguez in [facility] Diabetes mellitus type 2, uncontrolled ([address]     Diabetic foot ulcer ([address]     Diabetic polyneuropathy ([address]     Diabetic ulcer of right foot associated with type 2 diabetes mellitus ([address] 12/10/2015    Essential hypertension     \"never been on b/p medication that I know of\"    GERD (gastroesophageal reflux disease)     Hammer toe of left foot     Heart murmur     denies any chest pain or palpitations    History of tobacco abuse     Hx of BKA, right (HCC)     Macular edema, diabetic, bilateral ([address] 2018    Dr. Stephani Rodriguez referred to retina specialist for 2nd opinion    Marijuana abuse in remission     Onychomycosis     WD-Skin ulcer of fourth toe of right foot with necrosis of bone ([address] 2016     Past Surgical History:   Procedure Laterality Date    ABSCESS DRAINAGE Right     foot    FOOT DEBRIDEMENT Right 2016    I & D    LEG AMPUTATION BELOW KNEE Right 2016    OTHER SURGICAL HISTORY Right 1/14/15    sole of foot I&D    [place] DRAIN INFECT SHOULDER BURSA Left 2017    LEFT SHOULDER INCISION AND DRAINAGE performed by Krishna Brandt MD at [address] OFFICE/OUTPT [address] [facility] Right 2018    EXCISIONAL

## 2018-10-01 NOTE — PROGRESS NOTES
DEBRIDEMENT RIGHT BKA STUMP performed by Jennifer Malone MD at [facility] 112 Right 1/16/15    2nd toe with wound vac applied    WISDOM TOOTH EXTRACTION  ? when       Restrictions/Precautions:  Fall Risk, General Precautions                    Other position/activity restrictions: R BKA with drainage from stump, don't use prosthesis until cleared by doctor       Prior Level of Function:  ADL Assistance: Independent  Homemaking Assistance: Independent  Ambulation Assistance: Independent  Transfer Assistance: Independent  Additional Comments: Pt reports that he lives at and helps with management at [facility] prior to admit, which he states he has been there the past year and plans on going back there. He has a prosthesis in which he states that he wears all the time and does not otherwise use an assistive device for mobility. Subjective:  Response To Previous Treatment: Patient with no complaints from previous session. Family / Caregiver Present: No  Comments:  freq. vc to slow pace with activity  Subjective: Patient lying in bed upon arrival. pleasant, co-operative however seems to get in a hurry with activity so to complete session and return to his room    Pain:0   .           Social/Functional:  Lives With: Alone  Type of Home: Facility ([facility]- Pt is . )  Home Layout: Two level, Able to Live on Main level with bedroom/bathroom  Home Access: [address] [place] [address] Avenue:  (Pt reports that he might have a wheelchair, but not sure.)     Objective:  Rolling to Left: Modified independent (mat)  Rolling to Right: Modified independent (mat)  Supine to Sit: Modified independent  Sit to Supine: Modified independent  Scooting: Modified independent    Transfers  Stand Pivot Transfers: Supervision (no difficulties with transfer from surface to surface without ad transferring from pt's left side, (mod i able))    Propulsion 1  Propulsion: Manual  Level: Level Tile  Method: ambulate 25 ft with SW with S for short navigation needs at home  Short term goal 5: patient to propel wheelchair community distances at [facility] for functional mobility    Long term goals  Time Frame for Long term goals : No LTGs due to short ELOS

## 2018-10-02 ENCOUNTER — TELEPHONE (OUTPATIENT)
Dept: INTERNAL MEDICINE CLINIC | Age: 48
End: 2018-10-02

## 2018-10-02 LAB
GLUCOSE BLD-MCNC: 219 MG/DL (ref 70–108)
GLUCOSE BLD-MCNC: 226 MG/DL (ref 70–108)
GLUCOSE BLD-MCNC: 243 MG/DL (ref 70–108)
GLUCOSE BLD-MCNC: 333 MG/DL (ref 70–108)

## 2018-10-02 PROCEDURE — 6360000002 HC RX W HCPCS: Performed by: INTERNAL MEDICINE

## 2018-10-02 PROCEDURE — 6370000000 HC RX 637 (ALT 250 FOR IP): Performed by: FAMILY MEDICINE

## 2018-10-02 PROCEDURE — 82948 REAGENT STRIP/BLOOD GLUCOSE: CPT

## 2018-10-02 PROCEDURE — 97110 THERAPEUTIC EXERCISES: CPT

## 2018-10-02 PROCEDURE — 6370000000 HC RX 637 (ALT 250 FOR IP): Performed by: INTERNAL MEDICINE

## 2018-10-02 PROCEDURE — 2709999900 HC NON-CHARGEABLE SUPPLY

## 2018-10-02 PROCEDURE — 1290000000 HC SEMI PRIVATE OTHER R&B

## 2018-10-02 PROCEDURE — 2580000003 HC RX 258: Performed by: INTERNAL MEDICINE

## 2018-10-02 PROCEDURE — 6370000000 HC RX 637 (ALT 250 FOR IP): Performed by: NURSE PRACTITIONER

## 2018-10-02 PROCEDURE — 97530 THERAPEUTIC ACTIVITIES: CPT

## 2018-10-02 PROCEDURE — 97535 SELF CARE MNGMENT TRAINING: CPT

## 2018-10-02 RX ADMIN — FAMOTIDINE 20 MG: 20 TABLET ORAL at 09:50

## 2018-10-02 RX ADMIN — Medication 18 UNITS: at 08:29

## 2018-10-02 RX ADMIN — Medication 1 CAPSULE: at 09:50

## 2018-10-02 RX ADMIN — TIZANIDINE 4 MG: 4 TABLET ORAL at 17:39

## 2018-10-02 RX ADMIN — TIZANIDINE 4 MG: 4 TABLET ORAL at 12:11

## 2018-10-02 RX ADMIN — TIZANIDINE 4 MG: 4 TABLET ORAL at 09:50

## 2018-10-02 RX ADMIN — Medication 18 UNITS: at 17:35

## 2018-10-02 RX ADMIN — INSULIN GLARGINE 52 UNITS: 100 INJECTION, SOLUTION SUBCUTANEOUS at 23:24

## 2018-10-02 RX ADMIN — Medication 10 ML: at 23:37

## 2018-10-02 RX ADMIN — TIZANIDINE 4 MG: 4 TABLET ORAL at 23:37

## 2018-10-02 RX ADMIN — HYOSCYAMINE SULFATE: 16 SOLUTION at 09:50

## 2018-10-02 RX ADMIN — Medication 18 UNITS: at 12:09

## 2018-10-02 RX ADMIN — ENOXAPARIN SODIUM 40 MG: 40 INJECTION SUBCUTANEOUS at 09:50

## 2018-10-02 RX ADMIN — CALCIUM CARBONATE-VITAMIN D TAB 500 MG-200 UNIT 1 TABLET: 500-200 TAB at 23:37

## 2018-10-02 RX ADMIN — SERTRALINE 150 MG: 100 TABLET, FILM COATED ORAL at 09:50

## 2018-10-02 RX ADMIN — INSULIN GLARGINE 52 UNITS: 100 INJECTION, SOLUTION SUBCUTANEOUS at 08:29

## 2018-10-02 RX ADMIN — MIRTAZAPINE 15 MG: 15 TABLET, FILM COATED ORAL at 23:37

## 2018-10-02 RX ADMIN — Medication 10 ML: at 09:50

## 2018-10-02 RX ADMIN — CALCIUM CARBONATE-VITAMIN D TAB 500 MG-200 UNIT 1 TABLET: 500-200 TAB at 09:50

## 2018-10-02 RX ADMIN — CEFTRIAXONE SODIUM 2 G: 2 INJECTION, POWDER, FOR SOLUTION INTRAMUSCULAR; INTRAVENOUS at 12:08

## 2018-10-02 ASSESSMENT — PAIN SCALES - GENERAL
PAINLEVEL_OUTOF10: 0
PAINLEVEL_OUTOF10: 0

## 2018-10-02 NOTE — PLAN OF CARE
Problem: DISCHARGE BARRIERS  Goal: Patient's continuum of care needs are met    Intervention: INVOLVE PATIENT/S.O. IN DISCHARGE PLANNING PROCESS  The TCU team meeting was held today, 10/2. The patient continues on IVAB, which will be completed on 10/4, and he is doing well in therapies. Team recommendation made to discharge on 10/4 with hh nurse, OT, & PT. Following the meeting, the [facility] and  met with patient and provided update. He was pleased with the news of returning home soon. He has used the services of [address] [place] [address] and would like this agency to be contacted. He will require a wheelchair, and claims that he does not have one at the [facility] where he lives (even though one was issued to him after being hospitalized back in the spring). [facility] rep was contacted regarding wheelchair and is looking into the matter, as patient may have different insurance that may cover. In the event that there is no payor source for wheelchair, this worker will authorize the use of [employer]. A referral has been made to [address] [place] [address] for needed services, and a call was made to the [facility] at [facility] to schedule new patient appointment with Ryan Cheney CNP. This CNP will be out of office for the next several weeks, and an appointment has been scheduled for 10/22. In the interim, the patient has been scheduled with Nadya Flores CNP of [place] [address] for 10/9. The patient will be strongly encouraged to keep both appointments to establish a medical home. Will continue to assist with discharge plans.  NEFTALI Danielle

## 2018-10-02 NOTE — PROGRESS NOTES
DEBRIDEMENT RIGHT BKA STUMP performed by Arnol Rodriguez MD at [address] [place] [address] Drive Right 1/16/15    2nd toe with wound vac applied    WISDOM TOOTH EXTRACTION  ? when       Restrictions/Precautions:  Fall Risk, General Precautions   Other position/activity restrictions: R BKA with drainage from stump, don't use prosthesis until cleared by doctor       Prior Level of Function:  ADL Assistance: Independent  Homemaking Assistance: Independent  Ambulation Assistance: Independent  Transfer Assistance: Independent  Additional Comments: Pt reports that he lives at and helps with management at [facility] prior to admit, which he states he has been there the past year and plans on going back there. He has a prosthesis in which he states that he wears all the time and does not otherwise use an assistive device for mobility. Subjective   Subjective: Up in chair upon arrival, agreeable to OT session    Overall Orientation Status: Within Normal Limits       Pain:  Pain Assessment  Patient Currently in Pain: Denies       Objective  Overall Cognitive Status: WFL  Cognition Comment: Decreased Insight      ADL  UE Bathing: Supervision (Completed in shower)  LE Bathing: Minimal assistance (close SBA when standing to wash buttocks and rosita area, A for washing L foot)  UE Dressing: Setup (Donned sitting in w/c)  LE Dressing: Moderate assistance (donned sitting in w/c)     Transfers  Stand Pivot Transfers: Supervision (bedside chair>w/c)  Tub Transfers  Tub - Transfer From: Wheelchair  Tub - Transfer To:  Transfer tub bench  Tub Transfers: Supervision            Functional Mobility  Functional - Mobility Device: Wheelchair  Assist Level: Stand by assistance  Functional Mobility Comments: to/from shower room      Activity Tolerance:  Activity Tolerance: Patient Tolerated treatment well    Assessment:     Performance deficits / Impairments: Decreased functional mobility , Decreased endurance, Decreased ADL status,

## 2018-10-02 NOTE — PROGRESS NOTES
No changes from initial assessment, patients dressing is dry clean and intact with no drainage noted. /70, HR 93, 96% room air. Denies pain at this time. Patient is in bed resting with eyes closed. Call light within reach. Bed in lowest position.      [facility] student

## 2018-10-03 LAB
GLUCOSE BLD-MCNC: 148 MG/DL (ref 70–108)
GLUCOSE BLD-MCNC: 179 MG/DL (ref 70–108)
GLUCOSE BLD-MCNC: 210 MG/DL (ref 70–108)
GLUCOSE BLD-MCNC: 216 MG/DL (ref 70–108)

## 2018-10-03 PROCEDURE — 6370000000 HC RX 637 (ALT 250 FOR IP): Performed by: INTERNAL MEDICINE

## 2018-10-03 PROCEDURE — 97530 THERAPEUTIC ACTIVITIES: CPT

## 2018-10-03 PROCEDURE — 1290000000 HC SEMI PRIVATE OTHER R&B

## 2018-10-03 PROCEDURE — 6360000002 HC RX W HCPCS: Performed by: INTERNAL MEDICINE

## 2018-10-03 PROCEDURE — 97542 WHEELCHAIR MNGMENT TRAINING: CPT

## 2018-10-03 PROCEDURE — 2580000003 HC RX 258: Performed by: INTERNAL MEDICINE

## 2018-10-03 PROCEDURE — 82948 REAGENT STRIP/BLOOD GLUCOSE: CPT

## 2018-10-03 PROCEDURE — 6370000000 HC RX 637 (ALT 250 FOR IP): Performed by: FAMILY MEDICINE

## 2018-10-03 RX ORDER — TIZANIDINE 4 MG/1
4 TABLET ORAL 4 TIMES DAILY
Qty: 60 TABLET | Refills: 0 | Status: SHIPPED | OUTPATIENT
Start: 2018-10-04 | End: 2018-10-24 | Stop reason: ALTCHOICE

## 2018-10-03 RX ORDER — OYSTER SHELL CALCIUM WITH VITAMIN D 500; 200 MG/1; [IU]/1
1 TABLET, FILM COATED ORAL 2 TIMES DAILY
COMMUNITY
Start: 2018-10-04 | End: 2018-12-18 | Stop reason: ALTCHOICE

## 2018-10-03 RX ADMIN — CALCIUM CARBONATE-VITAMIN D TAB 500 MG-200 UNIT 1 TABLET: 500-200 TAB at 10:14

## 2018-10-03 RX ADMIN — Medication 1 CAPSULE: at 10:13

## 2018-10-03 RX ADMIN — Medication 18 UNITS: at 12:58

## 2018-10-03 RX ADMIN — HYOSCYAMINE SULFATE: 16 SOLUTION at 10:15

## 2018-10-03 RX ADMIN — CALCIUM CARBONATE-VITAMIN D TAB 500 MG-200 UNIT 1 TABLET: 500-200 TAB at 20:35

## 2018-10-03 RX ADMIN — Medication 10 ML: at 10:15

## 2018-10-03 RX ADMIN — TIZANIDINE 4 MG: 4 TABLET ORAL at 12:58

## 2018-10-03 RX ADMIN — TIZANIDINE 4 MG: 4 TABLET ORAL at 10:13

## 2018-10-03 RX ADMIN — INSULIN GLARGINE 52 UNITS: 100 INJECTION, SOLUTION SUBCUTANEOUS at 20:35

## 2018-10-03 RX ADMIN — Medication 18 UNITS: at 18:17

## 2018-10-03 RX ADMIN — Medication 18 UNITS: at 10:17

## 2018-10-03 RX ADMIN — MIRTAZAPINE 15 MG: 15 TABLET, FILM COATED ORAL at 20:35

## 2018-10-03 RX ADMIN — CEFTRIAXONE SODIUM 2 G: 2 INJECTION, POWDER, FOR SOLUTION INTRAMUSCULAR; INTRAVENOUS at 12:57

## 2018-10-03 RX ADMIN — FAMOTIDINE 20 MG: 20 TABLET ORAL at 10:14

## 2018-10-03 RX ADMIN — TIZANIDINE 4 MG: 4 TABLET ORAL at 20:35

## 2018-10-03 RX ADMIN — ENOXAPARIN SODIUM 40 MG: 40 INJECTION SUBCUTANEOUS at 10:14

## 2018-10-03 RX ADMIN — Medication 10 ML: at 22:30

## 2018-10-03 RX ADMIN — TIZANIDINE 4 MG: 4 TABLET ORAL at 18:17

## 2018-10-03 RX ADMIN — INSULIN GLARGINE 52 UNITS: 100 INJECTION, SOLUTION SUBCUTANEOUS at 10:17

## 2018-10-03 RX ADMIN — SERTRALINE 150 MG: 100 TABLET, FILM COATED ORAL at 10:13

## 2018-10-03 ASSESSMENT — PAIN SCALES - GENERAL
PAINLEVEL_OUTOF10: 0
PAINLEVEL_OUTOF10: 0

## 2018-10-03 NOTE — PROGRESS NOTES
Progress note: Infectious diseases    Patient - Kiley Lovell,  Age - 46 y.o.    - 1970      Room Number - 8E-68/068-A   MRN -  [id number]   Acct # - [de-identified]  Date of Admission -  2018  4:30 PM    SUBJECTIVE:   He feels good  The wound is getting better. OBJECTIVE   VITALS    height is 5' 5.98\" (1.676 m) and weight is 219 lb 6.4 oz (99.5 kg). His oral temperature is 97.9 °F (36.6 °C). His blood pressure is 158/95 (abnormal) and his pulse is 103. His respiration is 16 and oxygen saturation is 99%. Wt Readings from Last 3 Encounters:   10/01/18 219 lb 6.4 oz (99.5 kg)   18 237 lb 9.6 oz (107.8 kg)   18 223 lb (101.2 kg)       I/O (24 Hours)    Intake/Output Summary (Last 24 hours) at 10/03/18 1813  Last data filed at 10/03/18 1230   Gross per 24 hour   Intake              710 ml   Output                0 ml   Net              710 ml       General Appearance  Awake, alert, oriented,  not  In acute distress  HEENT - normocephalic, atraumatic, pink conjunctiva,  anicteric sclera  Neck - Supple, no mass  Lungs -  Bilateral good air entry, no rhonchi, no wheeze  Cardiovascular - Heart sounds are normal.    Abdomen - soft, not distended, nontender,   Neurologic -oriented  Skin - No bruising or bleeding  Extremities - right BKA. , open wound on the lateral stump, much improved from before    MEDICATIONS:      insulin glargine  52 Units Subcutaneous BID    sodium hypochlorite   Irrigation Daily    tiZANidine  4 mg Oral 4x Daily    cefTRIAXone (ROCEPHIN) IV  2 g Intravenous Q24H    enoxaparin  40 mg Subcutaneous Q24H    sodium chloride flush  10 mL Intravenous 2 times per day    calcium-vitamin D  1 tablet Oral BID    docusate sodium  100 mg Oral BID    insulin lispro  18 Units Subcutaneous TID AC    insulin lispro  0-6 Units Subcutaneous TID WC    lactobacillus  1 capsule Oral Daily with

## 2018-10-04 VITALS
HEIGHT: 66 IN | DIASTOLIC BLOOD PRESSURE: 81 MMHG | OXYGEN SATURATION: 100 % | SYSTOLIC BLOOD PRESSURE: 135 MMHG | BODY MASS INDEX: 35.26 KG/M2 | RESPIRATION RATE: 18 BRPM | TEMPERATURE: 97.7 F | HEART RATE: 96 BPM | WEIGHT: 219.4 LBS

## 2018-10-04 LAB
GLUCOSE BLD-MCNC: 147 MG/DL (ref 70–108)
GLUCOSE BLD-MCNC: 177 MG/DL (ref 70–108)

## 2018-10-04 PROCEDURE — 2580000003 HC RX 258: Performed by: INTERNAL MEDICINE

## 2018-10-04 PROCEDURE — 6370000000 HC RX 637 (ALT 250 FOR IP): Performed by: FAMILY MEDICINE

## 2018-10-04 PROCEDURE — 6370000000 HC RX 637 (ALT 250 FOR IP): Performed by: INTERNAL MEDICINE

## 2018-10-04 PROCEDURE — 2709999900 HC NON-CHARGEABLE SUPPLY

## 2018-10-04 PROCEDURE — 6360000002 HC RX W HCPCS: Performed by: INTERNAL MEDICINE

## 2018-10-04 PROCEDURE — 82948 REAGENT STRIP/BLOOD GLUCOSE: CPT

## 2018-10-04 PROCEDURE — 6370000000 HC RX 637 (ALT 250 FOR IP): Performed by: NURSE PRACTITIONER

## 2018-10-04 RX ADMIN — TIZANIDINE 4 MG: 4 TABLET ORAL at 12:36

## 2018-10-04 RX ADMIN — Medication 18 UNITS: at 09:08

## 2018-10-04 RX ADMIN — ENOXAPARIN SODIUM 40 MG: 40 INJECTION SUBCUTANEOUS at 08:57

## 2018-10-04 RX ADMIN — SERTRALINE 150 MG: 100 TABLET, FILM COATED ORAL at 08:57

## 2018-10-04 RX ADMIN — Medication 18 UNITS: at 12:38

## 2018-10-04 RX ADMIN — CALCIUM CARBONATE-VITAMIN D TAB 500 MG-200 UNIT 1 TABLET: 500-200 TAB at 08:57

## 2018-10-04 RX ADMIN — Medication 10 ML: at 09:00

## 2018-10-04 RX ADMIN — Medication 1 CAPSULE: at 08:57

## 2018-10-04 RX ADMIN — HYOSCYAMINE SULFATE: 16 SOLUTION at 09:12

## 2018-10-04 RX ADMIN — CEFTRIAXONE SODIUM 2 G: 2 INJECTION, POWDER, FOR SOLUTION INTRAMUSCULAR; INTRAVENOUS at 09:00

## 2018-10-04 RX ADMIN — FAMOTIDINE 20 MG: 20 TABLET ORAL at 08:57

## 2018-10-04 RX ADMIN — INSULIN GLARGINE 52 UNITS: 100 INJECTION, SOLUTION SUBCUTANEOUS at 09:08

## 2018-10-04 RX ADMIN — TIZANIDINE 4 MG: 4 TABLET ORAL at 08:57

## 2018-10-04 ASSESSMENT — PAIN SCALES - GENERAL: PAINLEVEL_OUTOF10: 0

## 2018-10-04 NOTE — DISCHARGE SUMMARY
Creatinine Latest Ref Range: 0.4 - 1.2 mg/dL 0.7   Anion Gap Latest Ref Range: 8.0 - 16.0 meq/L 14.0   Est, Glom Filt Rate Latest Units: ml/min/1.73m2 >90   Glucose Latest Ref Range: 70 - 108 mg/dL 334 (H)   Calcium Latest Ref Range: 8.5 - 10.5 mg/dL 8.4 (L)   WBC Latest Ref Range: 4.8 - 10.8 thou/mm3 12.8 (H)   RBC Latest Ref Range: 4.70 - 6.10 mill/mm3 4.31 (L)   Hemoglobin Quant Latest Ref Range: 14.0 - 18.0 gm/dl 12.8 (L)   Hematocrit Latest Ref Range: 42.0 - 52.0 % 38.4 (L)   MCV Latest Ref Range: 80.0 - 94.0 fL 89.1   MCH Latest Ref Range: 26.0 - 33.0 pg 29.7   MCHC Latest Ref Range: 32.2 - 35.5 gm/dl 33.3   MPV Latest Ref Range: 9.4 - 12.4 fL 10.1   RDW-CV Latest Ref Range: 11.5 - 14.5 % 13.8   RDW-SD Latest Ref Range: 35.0 - 45.0 fL 45.1 (H)   Platelet Count Latest Ref Range: 130 - 400 thou/mm3 283       Recent Labs      10/03/18   1206  10/03/18   1708  10/03/18   2034   POCGLU  216*  179*  210*       Patient Instructions:       Follow-up visits: See after visit summary from hospitalization    Discharge Medications:   Rhoda Alvarado   [place] Medication Instructions K:[id number]    Printed on:10/03/18 2254   Medication Information                      acetaminophen (TYLENOL) 500 MG tablet  Take 1,000 mg by mouth every 6 hours as needed for Pain             blood glucose monitor strips  Test blood sugars 4 times daily DX:E11.65             Blood Glucose Monitoring Suppl LIANNE  Use to test blood sugars DX:E11.65             calcium-vitamin D (OSCAL-500) 500-200 MG-UNIT per tablet  Take 1 tablet by mouth 2 times daily             glucose monitoring kit (FREESTYLE) monitoring kit  1 kit by Does not apply route 4 times daily (after meals and at bedtime)             insulin glargine (BASAGLAR KWIKPEN) 100 UNIT/ML injection pen  Inject 52 Units into the skin 2 times daily             insulin lispro (HUMALOG) 100 UNIT/ML injection vial  Inject 18 Units into the skin 3 times daily (before meals)

## 2018-10-09 ENCOUNTER — TELEPHONE (OUTPATIENT)
Dept: INTERNAL MEDICINE CLINIC | Age: 48
End: 2018-10-09

## 2018-10-09 ENCOUNTER — OFFICE VISIT (OUTPATIENT)
Dept: INTERNAL MEDICINE CLINIC | Age: 48
End: 2018-10-09
Payer: MEDICARE

## 2018-10-09 VITALS
DIASTOLIC BLOOD PRESSURE: 56 MMHG | WEIGHT: 228 LBS | RESPIRATION RATE: 12 BRPM | SYSTOLIC BLOOD PRESSURE: 112 MMHG | HEIGHT: 66 IN | BODY MASS INDEX: 36.64 KG/M2 | HEART RATE: 99 BPM | TEMPERATURE: 98.4 F

## 2018-10-09 DIAGNOSIS — E11.65 UNCONTROLLED TYPE 2 DIABETES MELLITUS WITH HYPERGLYCEMIA, WITH LONG-TERM CURRENT USE OF INSULIN (HCC): Chronic | ICD-10-CM

## 2018-10-09 DIAGNOSIS — E11.622 DIABETIC ULCER OF LOWER LEG (HCC): Primary | ICD-10-CM

## 2018-10-09 DIAGNOSIS — L97.909 DIABETIC ULCER OF LOWER LEG (HCC): Primary | ICD-10-CM

## 2018-10-09 DIAGNOSIS — R61 DIAPHORESIS: ICD-10-CM

## 2018-10-09 DIAGNOSIS — Z79.4 UNCONTROLLED TYPE 2 DIABETES MELLITUS WITH HYPERGLYCEMIA, WITH LONG-TERM CURRENT USE OF INSULIN (HCC): Chronic | ICD-10-CM

## 2018-10-09 DIAGNOSIS — R73.9 HYPERGLYCEMIA: ICD-10-CM

## 2018-10-09 DIAGNOSIS — Z89.511 STATUS POST BELOW KNEE AMPUTATION OF RIGHT LOWER EXTREMITY (HCC): ICD-10-CM

## 2018-10-09 LAB — GLUCOSE BLD-MCNC: 370 MG/DL

## 2018-10-09 PROCEDURE — G8484 FLU IMMUNIZE NO ADMIN: HCPCS | Performed by: NURSE PRACTITIONER

## 2018-10-09 PROCEDURE — 1111F DSCHRG MED/CURRENT MED MERGE: CPT | Performed by: NURSE PRACTITIONER

## 2018-10-09 PROCEDURE — 3017F COLORECTAL CA SCREEN DOC REV: CPT | Performed by: NURSE PRACTITIONER

## 2018-10-09 PROCEDURE — 82962 GLUCOSE BLOOD TEST: CPT | Performed by: NURSE PRACTITIONER

## 2018-10-09 PROCEDURE — 3046F HEMOGLOBIN A1C LEVEL >9.0%: CPT | Performed by: NURSE PRACTITIONER

## 2018-10-09 PROCEDURE — 1036F TOBACCO NON-USER: CPT | Performed by: NURSE PRACTITIONER

## 2018-10-09 PROCEDURE — G8417 CALC BMI ABV UP PARAM F/U: HCPCS | Performed by: NURSE PRACTITIONER

## 2018-10-09 PROCEDURE — 99214 OFFICE O/P EST MOD 30 MIN: CPT | Performed by: NURSE PRACTITIONER

## 2018-10-09 PROCEDURE — G8427 DOCREV CUR MEDS BY ELIG CLIN: HCPCS | Performed by: NURSE PRACTITIONER

## 2018-10-09 PROCEDURE — 2022F DILAT RTA XM EVC RTNOPTHY: CPT | Performed by: NURSE PRACTITIONER

## 2018-10-09 ASSESSMENT — ENCOUNTER SYMPTOMS
SORE THROAT: 0
EYE PAIN: 0
PHOTOPHOBIA: 0
NAUSEA: 0
COLOR CHANGE: 0
SINUS PRESSURE: 0
DIARRHEA: 0
BACK PAIN: 0
SHORTNESS OF BREATH: 0
BLOOD IN STOOL: 0
ABDOMINAL PAIN: 0
CONSTIPATION: 0
VOMITING: 0
COUGH: 0
ABDOMINAL DISTENTION: 0
CHEST TIGHTNESS: 0

## 2018-10-09 NOTE — PROGRESS NOTES
as diabetes due to psychosocial barriers. I anticipate he will need close monitoring as outpatient to prevent any complications. Return in about 2 days (around 10/11/2018) for hyperglycemia. Obtain labs today. Drop off your glucometer for my review so we can safely make adjustments to your insulin. Take your insulin as ordered, as soon as you get home:  Basaglar (long-acting insulin) 52 units every 12 hours. Humalog (short-acting insulin) 18 units 3x daily with meals. Change your dressing as soon as you get home. Check with home health nurse about ABD pads. Follow up with me in 2 days and bring your glucometer to appointment. Change dressing as directed by Dr. Jodeen Brittle and home health nursing staff. Follow up with retina eye specialist for diabetic macular edema. Follow up with Dr. Jodeen Brittle and Dr. Keiko Williamson as scheduled. Future Appointments  Date Time Provider Ebenezer Carmichael   10/11/2018 8:00 AM MARILYN Johnson - CNP [facility] [place]   10/17/2018 9:45 AM Chet Rincon MD STR[facility] WOUND None   10/22/2018 1:00 PM MARILYN De La Rosa - CNP [facility] WALL [facility] [place]   10/23/2018 1:00 PM Sofia Vogt RN [facility] [place]       HPI:     [facility] 9/18/2018-9/21/2018 and in TCU 9/21/18-10/4/18. Patient is not new to me. I evaluated him 6 months ago in [facility] hospitalization for similar issues. He was also in [facility] 8/23/18 and 9/17/18 for hyperglycemia, uncontrolled T2DM, and h/o medication noncompliance.  Most recent hospitalization was for:    Physical debility [R53.81] 09/21/2018     Class 2 severe obesity due to excess calories with serious comorbidity and body mass index (BMI) of 38.0 to 38.9 in adult [facility]) [E66.01, Z68.38] 09/21/2018    Necrosis of amputation stump (HCC) [T87.50]      Diabetic ulcer of lower leg ([facility] [Q24.014, L97.909]      Uncontrolled type 2 diabetes mellitus with foot ulcer, months ago (CBC, BMP, cpeptide, FLP, HIV, hepatitis panel, urine microalbumin creatinine ratio). He was evaluated by Dr. Berry Bias 5/2018 for diabetic eye exam and had been referred to retina specialist due to bilateral macular edema - he has not yet followed up. For Bipolar he says he's only been taking Zoloft regularly and is no longer following with psych at this time. He lives in [facility]. Past Medical History:   Diagnosis Date    Bipolar 2 disorder ([address]     previously followed with Dr. Sunny Han and Constance Pisano in [facility] Diabetes mellitus type 2, uncontrolled ([address]     Diabetic foot ulcer ([address]     Diabetic polyneuropathy ([address]     Diabetic ulcer of right foot associated with type 2 diabetes mellitus ([address] 12/10/2015    Essential hypertension     \"never been on b/p medication that I know of\"    GERD (gastroesophageal reflux disease)     Hammer toe of left foot     Heart murmur     denies any chest pain or palpitations    History of tobacco abuse     Hx of BKA, right (HCC)     Macular edema, diabetic, bilateral ([address] 05/04/2018    Dr. Neda Dumont referred to retina specialist for 2nd opinion    Marijuana abuse in remission     Onychomycosis     WD-Skin ulcer of fourth toe of right foot with necrosis of bone ([address] 6/29/2016      Past Surgical History:   Procedure Laterality Date    ABSCESS DRAINAGE Right     foot    FOOT DEBRIDEMENT Right 07/01/2016    I & D    LEG AMPUTATION BELOW KNEE Right 07/20/2016    OTHER SURGICAL HISTORY Right 1/14/15    sole of foot I&D    KS DRAIN INFECT SHOULDER BURSA Left 8/18/2017    LEFT SHOULDER INCISION AND DRAINAGE performed by Karen Lott MD at [facility] OFFICE/OUTPT [facility] Right 9/20/2018    EXCISIONAL DEBRIDEMENT RIGHT BKA STUMP [facility] 1[facility]16[facility]15    2nd toe with wound vac applied    WISDOM TOOTH EXTRACTION  ? when     Family History

## 2018-10-10 ENCOUNTER — HOSPITAL ENCOUNTER (OUTPATIENT)
Age: 48
Discharge: HOME | End: 2018-10-10
Payer: MEDICARE

## 2018-10-10 ENCOUNTER — TELEPHONE (OUTPATIENT)
Dept: INTERNAL MEDICINE CLINIC | Age: 48
End: 2018-10-10

## 2018-10-10 LAB
ANION GAP SERPL CALCULATED.3IONS-SCNC: 17 MEQ/L (ref 8–16)
BASOPHILS # BLD: 0.8 %
BASOPHILS ABSOLUTE: 0.1 THOU/MM3 (ref 0–0.1)
BUN BLDV-MCNC: 13 MG/DL (ref 7–22)
CALCIUM SERPL-MCNC: 9.7 MG/DL (ref 8.5–10.5)
CHLORIDE BLD-SCNC: 94 MEQ/L (ref 98–111)
CHOLESTEROL, TOTAL: 161 MG/DL (ref 100–199)
CO2: 23 MEQ/L (ref 23–33)
CREAT SERPL-MCNC: 0.7 MG/DL (ref 0.4–1.2)
CREATININE, URINE: 89 MG/DL
EOSINOPHIL # BLD: 5.5 %
EOSINOPHILS ABSOLUTE: 0.6 THOU/MM3 (ref 0–0.4)
ERYTHROCYTE [DISTWIDTH] IN BLOOD BY AUTOMATED COUNT: 14.7 % (ref 11.5–14.5)
ERYTHROCYTE [DISTWIDTH] IN BLOOD BY AUTOMATED COUNT: 45.3 FL (ref 35–45)
GFR SERPL CREATININE-BSD FRML MDRD: > 90 ML/MIN/1.73M2
GLUCOSE BLD-MCNC: 286 MG/DL (ref 70–108)
HAV IGM SER IA-ACNC: NEGATIVE
HCT VFR BLD CALC: 42.7 % (ref 42–52)
HDLC SERPL-MCNC: 30 MG/DL
HEMOGLOBIN: 14.6 GM/DL (ref 14–18)
HEPATITIS B CORE IGM ANTIBODY: NEGATIVE
HEPATITIS B SURFACE ANTIGEN: NEGATIVE
HEPATITIS C ANTIBODY: NEGATIVE
IMMATURE GRANS (ABS): 0.07 THOU/MM3 (ref 0–0.07)
IMMATURE GRANULOCYTES: 0.7 %
LDL CHOLESTEROL CALCULATED: 65 MG/DL
LYMPHOCYTES # BLD: 28.3 %
LYMPHOCYTES ABSOLUTE: 2.9 THOU/MM3 (ref 1–4.8)
MCH RBC QN AUTO: 29.4 PG (ref 26–33)
MCHC RBC AUTO-ENTMCNC: 34.2 GM/DL (ref 32.2–35.5)
MCV RBC AUTO: 86.1 FL (ref 80–94)
MICROALBUMIN UR-MCNC: 1.21 MG/DL
MICROALBUMIN/CREAT UR-RTO: 14 MG/G (ref 0–30)
MONOCYTES # BLD: 8.5 %
MONOCYTES ABSOLUTE: 0.9 THOU/MM3 (ref 0.4–1.3)
NUCLEATED RED BLOOD CELLS: 0 /100 WBC
PLATELET # BLD: 317 THOU/MM3 (ref 130–400)
PMV BLD AUTO: 9.7 FL (ref 9.4–12.4)
POTASSIUM SERPL-SCNC: 4.8 MEQ/L (ref 3.5–5.2)
RBC # BLD: 4.96 MILL/MM3 (ref 4.7–6.1)
SEG NEUTROPHILS: 56.2 %
SEGMENTED NEUTROPHILS ABSOLUTE COUNT: 5.7 THOU/MM3 (ref 1.8–7.7)
SODIUM BLD-SCNC: 134 MEQ/L (ref 135–145)
TRIGL SERPL-MCNC: 328 MG/DL (ref 0–199)
TSH SERPL DL<=0.05 MIU/L-ACNC: 2.83 UIU/ML (ref 0.4–4.2)
WBC # BLD: 10.2 THOU/MM3 (ref 4.8–10.8)

## 2018-10-10 PROCEDURE — 80048 BASIC METABOLIC PNL TOTAL CA: CPT

## 2018-10-10 PROCEDURE — 85025 COMPLETE CBC W/AUTO DIFF WBC: CPT

## 2018-10-10 PROCEDURE — 84681 ASSAY OF C-PEPTIDE: CPT

## 2018-10-10 PROCEDURE — 87389 HIV-1 AG W/HIV-1&-2 AB AG IA: CPT

## 2018-10-10 PROCEDURE — 82043 UR ALBUMIN QUANTITATIVE: CPT

## 2018-10-10 PROCEDURE — 36415 COLL VENOUS BLD VENIPUNCTURE: CPT

## 2018-10-10 PROCEDURE — 80074 ACUTE HEPATITIS PANEL: CPT

## 2018-10-10 PROCEDURE — 84443 ASSAY THYROID STIM HORMONE: CPT

## 2018-10-10 PROCEDURE — 80061 LIPID PANEL: CPT

## 2018-10-11 ENCOUNTER — APPOINTMENT (OUTPATIENT)
Dept: CT IMAGING | Age: 48
End: 2018-10-11
Payer: MEDICARE

## 2018-10-11 ENCOUNTER — OFFICE VISIT (OUTPATIENT)
Dept: INTERNAL MEDICINE CLINIC | Age: 48
End: 2018-10-11
Payer: MEDICARE

## 2018-10-11 ENCOUNTER — HOSPITAL ENCOUNTER (EMERGENCY)
Age: 48
Discharge: HOME | End: 2018-10-11
Attending: EMERGENCY MEDICINE
Payer: MEDICARE

## 2018-10-11 VITALS
WEIGHT: 220 LBS | SYSTOLIC BLOOD PRESSURE: 147 MMHG | HEIGHT: 66 IN | TEMPERATURE: 98.2 F | RESPIRATION RATE: 17 BRPM | BODY MASS INDEX: 35.36 KG/M2 | OXYGEN SATURATION: 98 % | DIASTOLIC BLOOD PRESSURE: 86 MMHG | HEART RATE: 96 BPM

## 2018-10-11 VITALS
TEMPERATURE: 98.2 F | HEART RATE: 106 BPM | DIASTOLIC BLOOD PRESSURE: 88 MMHG | HEIGHT: 66 IN | SYSTOLIC BLOOD PRESSURE: 140 MMHG | WEIGHT: 231.6 LBS | BODY MASS INDEX: 37.22 KG/M2

## 2018-10-11 DIAGNOSIS — E11.65 UNCONTROLLED TYPE 2 DIABETES MELLITUS WITH HYPERGLYCEMIA, WITH LONG-TERM CURRENT USE OF INSULIN (HCC): Primary | Chronic | ICD-10-CM

## 2018-10-11 DIAGNOSIS — R79.89 ELEVATED LACTIC ACID LEVEL: ICD-10-CM

## 2018-10-11 DIAGNOSIS — R19.7 DIARRHEA, UNSPECIFIED TYPE: ICD-10-CM

## 2018-10-11 DIAGNOSIS — R42 DIZZINESS: ICD-10-CM

## 2018-10-11 DIAGNOSIS — R19.7 DIARRHEA, UNSPECIFIED TYPE: Primary | ICD-10-CM

## 2018-10-11 DIAGNOSIS — Z89.511 STATUS POST BELOW KNEE AMPUTATION OF RIGHT LOWER EXTREMITY (HCC): ICD-10-CM

## 2018-10-11 DIAGNOSIS — T87.40 INFECTION OF BELOW KNEE AMPUTATION STUMP (HCC): ICD-10-CM

## 2018-10-11 DIAGNOSIS — R10.30 LOWER ABDOMINAL PAIN: ICD-10-CM

## 2018-10-11 DIAGNOSIS — Z79.4 UNCONTROLLED TYPE 2 DIABETES MELLITUS WITH HYPERGLYCEMIA, WITH LONG-TERM CURRENT USE OF INSULIN (HCC): Primary | Chronic | ICD-10-CM

## 2018-10-11 DIAGNOSIS — R14.2 ERUCTATION: ICD-10-CM

## 2018-10-11 LAB
ADENOVIRUS F 40 41 PCR: NOT DETECTED
ALBUMIN SERPL-MCNC: 3.9 G/DL (ref 3.5–5.1)
ALP BLD-CCNC: 67 U/L (ref 38–126)
ALT SERPL-CCNC: 21 U/L (ref 11–66)
ANION GAP SERPL CALCULATED.3IONS-SCNC: 12 MEQ/L (ref 8–16)
AST SERPL-CCNC: 28 U/L (ref 5–40)
ASTROVIRUS PCR: NOT DETECTED
BACTERIA: ABNORMAL /HPF
BASOPHILS # BLD: 0.5 %
BASOPHILS ABSOLUTE: 0.1 THOU/MM3 (ref 0–0.1)
BILIRUB SERPL-MCNC: 0.2 MG/DL (ref 0.3–1.2)
BILIRUBIN DIRECT: < 0.2 MG/DL (ref 0–0.3)
BILIRUBIN URINE: NEGATIVE
BLOOD, URINE: NEGATIVE
BUN BLDV-MCNC: 10 MG/DL (ref 7–22)
C-PEPTIDE: 5.5 NG/ML (ref 1.1–4.4)
CALCIUM SERPL-MCNC: 9.2 MG/DL (ref 8.5–10.5)
CAMPYLOBACTER PCR: NOT DETECTED
CASTS 2: ABNORMAL /LPF
CASTS UA: ABNORMAL /LPF
CHARACTER, URINE: CLEAR
CHLORIDE BLD-SCNC: 97 MEQ/L (ref 98–111)
CLOSTRIDIUM DIFFICILE, PCR: NOT DETECTED
CO2: 25 MEQ/L (ref 23–33)
COLOR: YELLOW
CREAT SERPL-MCNC: 0.6 MG/DL (ref 0.4–1.2)
CRYPTOSPORIDIUM PCR: NOT DETECTED
CRYSTALS, UA: ABNORMAL
CYCLOSPORA CAYETANENSIS PCR: NOT DETECTED
E COLI 0157 PCR: NORMAL
E COLI ENTEROAGGREGATIVE PCR: NOT DETECTED
E COLI ENTEROPATHOGENIC PCR: NOT DETECTED
E COLI ENTEROTOXIGENIC PCR: NOT DETECTED
E COLI SHIGA LIKE TOXIN PCR: NOT DETECTED
E COLI SHIGELLA/ENTEROINVASIVE PCR: NOT DETECTED
E HISTOLYTICA GI FILM ARRAY: NOT DETECTED
EKG ATRIAL RATE: 101 BPM
EKG P AXIS: 43 DEGREES
EKG P-R INTERVAL: 156 MS
EKG Q-T INTERVAL: 352 MS
EKG QRS DURATION: 76 MS
EKG QTC CALCULATION (BAZETT): 456 MS
EKG R AXIS: 15 DEGREES
EKG T AXIS: 34 DEGREES
EKG VENTRICULAR RATE: 101 BPM
EOSINOPHIL # BLD: 5.6 %
EOSINOPHILS ABSOLUTE: 0.6 THOU/MM3 (ref 0–0.4)
EPITHELIAL CELLS, UA: ABNORMAL /HPF
ERYTHROCYTE [DISTWIDTH] IN BLOOD BY AUTOMATED COUNT: 14.7 % (ref 11.5–14.5)
ERYTHROCYTE [DISTWIDTH] IN BLOOD BY AUTOMATED COUNT: 44.8 FL (ref 35–45)
GFR SERPL CREATININE-BSD FRML MDRD: > 90 ML/MIN/1.73M2
GIARDIA LAMBLIA PCR: NOT DETECTED
GLUCOSE BLD-MCNC: 307 MG/DL (ref 70–108)
GLUCOSE BLD-MCNC: 314 MG/DL
GLUCOSE URINE: 500 MG/DL
HCT VFR BLD CALC: 38.4 % (ref 42–52)
HEMOGLOBIN: 13.4 GM/DL (ref 14–18)
IMMATURE GRANS (ABS): 0.09 THOU/MM3 (ref 0–0.07)
IMMATURE GRANULOCYTES: 0.9 %
KETONES, URINE: NEGATIVE
LACTIC ACID, SEPSIS: 2.5 MMOL/L (ref 0.5–1.9)
LACTIC ACID, SEPSIS: 2.9 MMOL/L (ref 0.5–1.9)
LEUKOCYTE ESTERASE, URINE: NEGATIVE
LIPASE: 31.1 U/L (ref 5.6–51.3)
LYMPHOCYTES # BLD: 26.7 %
LYMPHOCYTES ABSOLUTE: 2.7 THOU/MM3 (ref 1–4.8)
MAGNESIUM: 1.9 MG/DL (ref 1.6–2.4)
MCH RBC QN AUTO: 29.6 PG (ref 26–33)
MCHC RBC AUTO-ENTMCNC: 34.9 GM/DL (ref 32.2–35.5)
MCV RBC AUTO: 85 FL (ref 80–94)
MISCELLANEOUS 2: ABNORMAL
MONOCYTES # BLD: 8.9 %
MONOCYTES ABSOLUTE: 0.9 THOU/MM3 (ref 0.4–1.3)
NITRITE, URINE: NEGATIVE
NOROVIRUS GI GII PCR: NOT DETECTED
NUCLEATED RED BLOOD CELLS: 0 /100 WBC
OSMOLALITY CALCULATION: 278.9 MOSMOL/KG (ref 275–300)
PH UA: 5
PLATELET # BLD: 255 THOU/MM3 (ref 130–400)
PLESIOMONAS SHIGELLOIDES PCR: NOT DETECTED
PMV BLD AUTO: 9.4 FL (ref 9.4–12.4)
POTASSIUM SERPL-SCNC: 4.6 MEQ/L (ref 3.5–5.2)
PROTEIN UA: NEGATIVE
RBC # BLD: 4.52 MILL/MM3 (ref 4.7–6.1)
RBC URINE: ABNORMAL /HPF
RENAL EPITHELIAL, UA: ABNORMAL
ROTAVIRUS A PCR: NOT DETECTED
SALMONELLA PCR: NOT DETECTED
SAPOVIRUS PCR: NOT DETECTED
SEG NEUTROPHILS: 57.4 %
SEGMENTED NEUTROPHILS ABSOLUTE COUNT: 5.7 THOU/MM3 (ref 1.8–7.7)
SODIUM BLD-SCNC: 134 MEQ/L (ref 135–145)
SPECIFIC GRAVITY, URINE: <= 1.005 (ref 1–1.03)
TOTAL PROTEIN: 8.2 G/DL (ref 6.1–8)
UROBILINOGEN, URINE: 0.2 EU/DL
VIBRIO CHOLERAE PCR: NOT DETECTED
VIBRIO PCR: NOT DETECTED
WBC # BLD: 10 THOU/MM3 (ref 4.8–10.8)
WBC UA: ABNORMAL /HPF
YEAST: ABNORMAL
YERSINIA ENTEROCOLITICA PCR: NOT DETECTED

## 2018-10-11 PROCEDURE — 85025 COMPLETE CBC W/AUTO DIFF WBC: CPT

## 2018-10-11 PROCEDURE — 83605 ASSAY OF LACTIC ACID: CPT

## 2018-10-11 PROCEDURE — 99214 OFFICE O/P EST MOD 30 MIN: CPT | Performed by: NURSE PRACTITIONER

## 2018-10-11 PROCEDURE — 2022F DILAT RTA XM EVC RTNOPTHY: CPT | Performed by: NURSE PRACTITIONER

## 2018-10-11 PROCEDURE — G8417 CALC BMI ABV UP PARAM F/U: HCPCS | Performed by: NURSE PRACTITIONER

## 2018-10-11 PROCEDURE — 93010 ELECTROCARDIOGRAM REPORT: CPT | Performed by: INTERNAL MEDICINE

## 2018-10-11 PROCEDURE — 2709999900 HC NON-CHARGEABLE SUPPLY

## 2018-10-11 PROCEDURE — 99284 EMERGENCY DEPT VISIT MOD MDM: CPT

## 2018-10-11 PROCEDURE — 36415 COLL VENOUS BLD VENIPUNCTURE: CPT

## 2018-10-11 PROCEDURE — 74177 CT ABD & PELVIS W/CONTRAST: CPT

## 2018-10-11 PROCEDURE — 2580000003 HC RX 258: Performed by: PHYSICIAN ASSISTANT

## 2018-10-11 PROCEDURE — 3046F HEMOGLOBIN A1C LEVEL >9.0%: CPT | Performed by: NURSE PRACTITIONER

## 2018-10-11 PROCEDURE — G8427 DOCREV CUR MEDS BY ELIG CLIN: HCPCS | Performed by: NURSE PRACTITIONER

## 2018-10-11 PROCEDURE — 93005 ELECTROCARDIOGRAM TRACING: CPT | Performed by: PHYSICIAN ASSISTANT

## 2018-10-11 PROCEDURE — 1111F DSCHRG MED/CURRENT MED MERGE: CPT | Performed by: NURSE PRACTITIONER

## 2018-10-11 PROCEDURE — 1036F TOBACCO NON-USER: CPT | Performed by: NURSE PRACTITIONER

## 2018-10-11 PROCEDURE — 6360000004 HC RX CONTRAST MEDICATION: Performed by: PHYSICIAN ASSISTANT

## 2018-10-11 PROCEDURE — 96361 HYDRATE IV INFUSION ADD-ON: CPT

## 2018-10-11 PROCEDURE — 96360 HYDRATION IV INFUSION INIT: CPT

## 2018-10-11 PROCEDURE — 80053 COMPREHEN METABOLIC PANEL: CPT

## 2018-10-11 PROCEDURE — 87507 IADNA-DNA/RNA PROBE TQ 12-25: CPT

## 2018-10-11 PROCEDURE — G8484 FLU IMMUNIZE NO ADMIN: HCPCS | Performed by: NURSE PRACTITIONER

## 2018-10-11 PROCEDURE — 83735 ASSAY OF MAGNESIUM: CPT

## 2018-10-11 PROCEDURE — 87086 URINE CULTURE/COLONY COUNT: CPT

## 2018-10-11 PROCEDURE — 82248 BILIRUBIN DIRECT: CPT

## 2018-10-11 PROCEDURE — 83690 ASSAY OF LIPASE: CPT

## 2018-10-11 PROCEDURE — 82962 GLUCOSE BLOOD TEST: CPT | Performed by: NURSE PRACTITIONER

## 2018-10-11 PROCEDURE — 81001 URINALYSIS AUTO W/SCOPE: CPT

## 2018-10-11 PROCEDURE — 3017F COLORECTAL CA SCREEN DOC REV: CPT | Performed by: NURSE PRACTITIONER

## 2018-10-11 RX ORDER — SODIUM CHLORIDE 9 MG/ML
INJECTION, SOLUTION INTRAVENOUS CONTINUOUS
Status: DISCONTINUED | OUTPATIENT
Start: 2018-10-11 | End: 2018-10-11 | Stop reason: HOSPADM

## 2018-10-11 RX ORDER — DIPHENOXYLATE HYDROCHLORIDE AND ATROPINE SULFATE 2.5; .025 MG/1; MG/1
1 TABLET ORAL 4 TIMES DAILY PRN
Qty: 12 TABLET | Refills: 0 | Status: SHIPPED | OUTPATIENT
Start: 2018-10-11 | End: 2018-10-14

## 2018-10-11 RX ORDER — 0.9 % SODIUM CHLORIDE 0.9 %
1000 INTRAVENOUS SOLUTION INTRAVENOUS ONCE
Status: COMPLETED | OUTPATIENT
Start: 2018-10-11 | End: 2018-10-11

## 2018-10-11 RX ADMIN — SODIUM CHLORIDE: 9 INJECTION, SOLUTION INTRAVENOUS at 10:04

## 2018-10-11 RX ADMIN — SODIUM CHLORIDE 1000 ML: 9 INJECTION, SOLUTION INTRAVENOUS at 13:37

## 2018-10-11 RX ADMIN — IOPAMIDOL 80 ML: 755 INJECTION, SOLUTION INTRAVENOUS at 10:19

## 2018-10-11 ASSESSMENT — ENCOUNTER SYMPTOMS
WHEEZING: 0
DIARRHEA: 1
VOMITING: 0
CHEST TIGHTNESS: 0
COUGH: 0
ABDOMINAL DISTENTION: 0
EYE PAIN: 0
NAUSEA: 1
RHINORRHEA: 0
DIARRHEA: 1
PHOTOPHOBIA: 0
STRIDOR: 0
ABDOMINAL PAIN: 1
CHEST TIGHTNESS: 0
VOMITING: 0
EYE PAIN: 0
NAUSEA: 0
COUGH: 0
ABDOMINAL PAIN: 1
SHORTNESS OF BREATH: 0
EYE DISCHARGE: 0
BACK PAIN: 0
WHEEZING: 0

## 2018-10-11 NOTE — PATIENT INSTRUCTIONS
Obtain labs today. Drop off your glucometer for my review on Monday, 10/15, so we can safely make adjustments to your insulin. Take your insulin as ordered:  Basaglar (long-acting insulin) 52 units every 12 hours. Humalog (short-acting insulin) 18 units 3x daily with meals. Follow up with me in 6 days and bring your glucometer to appointment. Change dressing as directed by Dr. Natalie Perrin and home health nursing staff. Follow up with retina eye specialist for diabetic macular edema. Follow up with Dr. Natalie Perrin and Dr. Drew Renee as scheduled.

## 2018-10-11 NOTE — PROGRESS NOTES
 Diabetes mellitus type 2, uncontrolled ([address]     Diabetic foot ulcer ([address]     Diabetic polyneuropathy ([address]     Diabetic ulcer of right foot associated with type 2 diabetes mellitus ([address] 12/10/2015    Essential hypertension     \"never been on b/p medication that I know of\"    GERD (gastroesophageal reflux disease)     Hammer toe of left foot     Heart murmur     denies any chest pain or palpitations    History of tobacco abuse     Hx of BKA, right (HCC)     Macular edema, diabetic, bilateral ([address] 05/04/2018    Dr. Nathalia Preston referred to retina specialist for 2nd opinion    Marijuana abuse in remission     Onychomycosis     WD-Skin ulcer of fourth toe of right foot with necrosis of bone ([address] 6/29/2016      Past Surgical History:   Procedure Laterality Date    ABSCESS DRAINAGE Right     foot    FOOT DEBRIDEMENT Right 07/01/2016    I & D    LEG AMPUTATION BELOW KNEE Right 07/20/2016    OTHER SURGICAL HISTORY Right 1/14/15    sole of foot I&D    OR DRAIN INFECT SHOULDER BURSA Left 8/18/2017    LEFT SHOULDER INCISION AND DRAINAGE performed by Suzi iWlliam MD at [address] [place] [address] OFFICE/OUTPT [facility] Right 9/20/2018    EXCISIONAL DEBRIDEMENT RIGHT BKA STUMP performed by Mary Basilio MD at [address] Right 1/16/15    2nd toe with wound vac applied    WISDOM TOOTH EXTRACTION  ? when     Family History   Problem Relation Age of Onset    Diabetes Mother     Other Mother         pneumonia, H1N1    Depression Mother     Early Death Mother     High Blood Pressure Mother     High Cholesterol Mother     Vision Loss Maternal Grandmother     Arthritis Maternal Grandfather     Heart Disease Maternal Grandfather      Social History   Substance Use Topics    Smoking status: Former Smoker     Packs/day: 5.00     Years: 13.00     Types: Cigars     Start date: 3/1/2018     Quit date: 9/1/2018    Smokeless tobacco: Never Used      Comment: 5 cigars a day    Alcohol use

## 2018-10-12 LAB
HIV-2 AB: NEGATIVE
ORGANISM: ABNORMAL
URINE CULTURE REFLEX: ABNORMAL

## 2018-10-15 ENCOUNTER — OFFICE VISIT (OUTPATIENT)
Dept: INTERNAL MEDICINE CLINIC | Age: 48
End: 2018-10-15
Payer: MEDICARE

## 2018-10-15 ENCOUNTER — TELEPHONE (OUTPATIENT)
Dept: INTERNAL MEDICINE CLINIC | Age: 48
End: 2018-10-15

## 2018-10-15 VITALS
BODY MASS INDEX: 37.38 KG/M2 | SYSTOLIC BLOOD PRESSURE: 134 MMHG | TEMPERATURE: 98.4 F | HEIGHT: 66 IN | HEART RATE: 80 BPM | DIASTOLIC BLOOD PRESSURE: 78 MMHG | RESPIRATION RATE: 16 BRPM | WEIGHT: 232.6 LBS

## 2018-10-15 DIAGNOSIS — E11.65 POORLY CONTROLLED TYPE 2 DIABETES MELLITUS (HCC): ICD-10-CM

## 2018-10-15 DIAGNOSIS — R19.7 DIARRHEA, UNSPECIFIED TYPE: ICD-10-CM

## 2018-10-15 DIAGNOSIS — K62.9 ABNORMALITY OF RECTUM: ICD-10-CM

## 2018-10-15 DIAGNOSIS — E87.20 LACTIC ACIDOSIS: ICD-10-CM

## 2018-10-15 DIAGNOSIS — Z89.511 STATUS POST BELOW KNEE AMPUTATION OF RIGHT LOWER EXTREMITY (HCC): ICD-10-CM

## 2018-10-15 DIAGNOSIS — R19.5 DARK STOOLS: ICD-10-CM

## 2018-10-15 DIAGNOSIS — T87.40 INFECTION OF BELOW KNEE AMPUTATION STUMP (HCC): ICD-10-CM

## 2018-10-15 DIAGNOSIS — Z79.4 UNCONTROLLED TYPE 2 DIABETES MELLITUS WITH HYPERGLYCEMIA, WITH LONG-TERM CURRENT USE OF INSULIN (HCC): Primary | Chronic | ICD-10-CM

## 2018-10-15 DIAGNOSIS — E11.65 UNCONTROLLED TYPE 2 DIABETES MELLITUS WITH HYPERGLYCEMIA, WITH LONG-TERM CURRENT USE OF INSULIN (HCC): Primary | Chronic | ICD-10-CM

## 2018-10-15 LAB — GLUCOSE BLD-MCNC: 245 MG/DL

## 2018-10-15 PROCEDURE — G8427 DOCREV CUR MEDS BY ELIG CLIN: HCPCS | Performed by: NURSE PRACTITIONER

## 2018-10-15 PROCEDURE — 1036F TOBACCO NON-USER: CPT | Performed by: NURSE PRACTITIONER

## 2018-10-15 PROCEDURE — 82962 GLUCOSE BLOOD TEST: CPT | Performed by: NURSE PRACTITIONER

## 2018-10-15 PROCEDURE — 2022F DILAT RTA XM EVC RTNOPTHY: CPT | Performed by: NURSE PRACTITIONER

## 2018-10-15 PROCEDURE — 3046F HEMOGLOBIN A1C LEVEL >9.0%: CPT | Performed by: NURSE PRACTITIONER

## 2018-10-15 PROCEDURE — 1111F DSCHRG MED/CURRENT MED MERGE: CPT | Performed by: NURSE PRACTITIONER

## 2018-10-15 PROCEDURE — 3017F COLORECTAL CA SCREEN DOC REV: CPT | Performed by: NURSE PRACTITIONER

## 2018-10-15 PROCEDURE — G8484 FLU IMMUNIZE NO ADMIN: HCPCS | Performed by: NURSE PRACTITIONER

## 2018-10-15 PROCEDURE — G8417 CALC BMI ABV UP PARAM F/U: HCPCS | Performed by: NURSE PRACTITIONER

## 2018-10-15 PROCEDURE — 99214 OFFICE O/P EST MOD 30 MIN: CPT | Performed by: NURSE PRACTITIONER

## 2018-10-15 RX ORDER — LOPERAMIDE HYDROCHLORIDE 2 MG/1
2 CAPSULE ORAL 4 TIMES DAILY PRN
COMMUNITY
End: 2018-10-17 | Stop reason: ALTCHOICE

## 2018-10-15 RX ORDER — DIPHENOXYLATE HYDROCHLORIDE AND ATROPINE SULFATE 2.5; .025 MG/1; MG/1
1 TABLET ORAL 4 TIMES DAILY
Qty: 12 TABLET | Refills: 0 | Status: SHIPPED | OUTPATIENT
Start: 2018-10-15 | End: 2018-10-18

## 2018-10-15 RX ORDER — ATORVASTATIN CALCIUM 40 MG/1
40 TABLET, FILM COATED ORAL DAILY
Qty: 30 TABLET | Refills: 0 | Status: CANCELLED | OUTPATIENT
Start: 2018-10-15

## 2018-10-15 ASSESSMENT — ENCOUNTER SYMPTOMS
ABDOMINAL PAIN: 0
CONSTIPATION: 0
BLOOD IN STOOL: 0
VOMITING: 0
COUGH: 0
EYE PAIN: 0
PHOTOPHOBIA: 0
WHEEZING: 0
NAUSEA: 1
CHEST TIGHTNESS: 0
STRIDOR: 0
SHORTNESS OF BREATH: 0
ABDOMINAL DISTENTION: 0
DIARRHEA: 1

## 2018-10-15 NOTE — PATIENT INSTRUCTIONS
with screening tests. Depending on the type of test, any polyps found may be removed during the tests. Colorectal cancer usually does not cause symptoms at first. But regular tests can help find it early, before it spreads and becomes harder to treat. Experts advise routine tests for colon cancer for people starting at age 48. And they advise people with a higher risk of colon cancer to get tested sooner. Talk with your doctor about when you should start testing. Discuss which tests you need. Follow-up care is a key part of your treatment and safety. Be sure to make and go to all appointments, and call your doctor if you are having problems. It's also a good idea to know your test results and keep a list of the medicines you take. What are the main screening tests for colon cancer? · Stool tests. These include the fecal immunochemical test (FIT) and the fecal occult blood test (FOBT). These tests check stool samples for signs of cancer. If your test is positive, you will need to have a colonoscopy. · Sigmoidoscopy. This test lets your doctor look at the lining of your rectum and the lowest part of your colon. Your doctor uses a lighted tube called a sigmoidoscope. This test can't find cancers or polyps in the upper part of your colon. In some cases, polyps that are found can be removed. But if your doctor finds polyps, you will need to have a colonoscopy to check the upper part of your colon. · Colonoscopy. This test lets your doctor look at the lining of your rectum and your entire colon. The doctor uses a thin, flexible tool called a colonoscope. It can also be used to remove polyps or get a tissue sample (biopsy). What tests do you need? The following guidelines are for people age 48 and over who are not at high risk for colorectal cancer. You may have at least one of these tests as directed by your doctor.   · Fecal immunochemical test (FIT) or fecal occult blood test (FOBT) every year  · Sigmoidoscopy

## 2018-10-15 NOTE — PROGRESS NOTES
with Dr. Mehdi Patel in 2 days. Continue to change dressing daily as directed by Dr. Mehdi Patel and home health nurse. 7. H/o elevated lactate    He is not sickly appearing. Reassured that last BMP in ED showed WNL CO2, slight elevation in anion gap 17. Repeat BMP if new or worsening symptoms. Hold off on Metformin at this time due to chronically elevated BS and risk for acidosis. 8. Noncompliance    Concerned about psychosocial barriers and his ability to adhere to plan of care. With all of his issues and risk for complications, recommend close outpatient f/u. No Follow-up on file. Future Appointments  Date Time Provider Ebenezer Carmichael   10/17/2018 9:45 AM Mercy Zuniga MD [facility] WOUND None   10/22/2018 1:00 PM Samson Schlatter, APRN - CNP [facility] WALL [facility] [place]   10/23/2018 1:00 PM Dipti Lutz RN [facility] [place]   10/24/2018 10:15 AM Arabella Bernal MD [facility] [place]   10/25/2018 9:00 AM Edwin Tarango, APRN - CNP [facility]       HPI:     3 day ED follow for diarrhea and poorly controlled T2DM. ED Course  Patient given IV fluids. GI bacterial pathogens by PCR unremarkable, negative cdiff. CT abdomen/pelvis unremarkable except for mildly thickened rectum. His lactate was elevated, however this was not thought to be due to sepsis. He was prescribed Lomotil and advised outpatient f/u. HPI    Diarrhea  Onset: 10/7/18 (9 days ago). Stool described as \"runny, alternating light and dark brown, foul-smelling, like it's mixed with blood. \" He had 7 episodes yesterday. Unrelieved with Lomotil and Imodium that he has been taking sporadically. He feels no better or worse since last evaluated. Associated with nausea and polyuria. Patient denies diet changes or high fiber diet. He reports eating tacos, cheese puffs, and cottage cheese yesterday, and he drank 3 bottles of water and 2 diet pepsi's. He denies sick contact.  He says, \"I'm eating the same food as others at the [facility], and no one else is having diarrhea. \" He denies fever, chills, abdominal pain, hematochezia, melena, vomiting, dysuria, hematuria, polyphagia, polydipsia, dizziness, weakness, chest pain. He's never had colonoscopy. Type 2 Diabetes  Onset of T2DM: ? unknown - previously followed with endocrinologist, Dr. Anayeli Sinha, in [facility]. His disease course has been getting worse since being discharged from [address] [place] [address]. Last A1C 11.0% when checked 4 weeks ago. H/o compliance issues. He's checked BS 3 times in the past 3-4 days since being the ED (, AC lunch 312, AC supper 306). He states he is adherent taking his insulin regimen (Basalgar 52 units twice daily and Humalog 18 units 3x daily AC with sliding scale) despite not regularly checking his BS. He is not on Metformin or statin. He has diabetic macular edema bilaterally, and Dr. Rosa Kim has referred him to retina specialist - he has not gone yet. Following with Dr. Natalie Perrin for right BKA stump wound, and [address] [place] [address] comes regularly to help with wound care. Recent lab results:  Low HDL 30, mild elevation in . HIV and hepatitis panel negative. cpeptide confirms T2DM. CBC, tsh and urine microalbuminuria WNL.     Recent imaging:  CT abdomen pelvis:  Narrative   PROCEDURE: CT ABDOMEN PELVIS W IV CONTRAST       CLINICAL INFORMATION: abdominal pain diarrhea .       COMPARISON: None.       TECHNIQUE: 5 mm axial CT images were obtained through the abdomen and pelvis after the administration of intravenous and oral contrast. Coronal and sagittal reconstructions were obtained.       All CT scans at this facility use dose modulation, iterative reconstruction, and/or weight-based dosing when appropriate to reduce radiation dose to as low as reasonably achievable.       FINDINGS   Lung base: clear.       Liver and spleen: homogeneous attenuation, no masses seen.       Biliary: normal; no calculi.       Pancreas: head, body, and tail

## 2018-10-16 NOTE — PROGRESS NOTES
CLINICAL PHARMACY NOTE: MEDS TO [address] Select Patient?: No  Total # of Prescriptions Filled: 3   The following medications were delivered to the patient:  · Basaglar kwikpen 100 unit/mL  · Tizanidine 4 mg  · Hysept 0.25% solution  Total # of Interventions Completed: 2  Time Spent (min): 30    Additional Documentation:

## 2018-10-17 ENCOUNTER — TELEPHONE (OUTPATIENT)
Dept: INTERNAL MEDICINE CLINIC | Age: 48
End: 2018-10-17

## 2018-10-17 ENCOUNTER — HOSPITAL ENCOUNTER (EMERGENCY)
Age: 48
Discharge: HOME | End: 2018-10-17
Payer: MEDICARE

## 2018-10-17 ENCOUNTER — HOSPITAL ENCOUNTER (OUTPATIENT)
Dept: WOUND CARE | Age: 48
Discharge: HOME | End: 2018-10-17
Payer: MEDICARE

## 2018-10-17 VITALS
OXYGEN SATURATION: 100 % | BODY MASS INDEX: 37.28 KG/M2 | DIASTOLIC BLOOD PRESSURE: 75 MMHG | WEIGHT: 232 LBS | HEART RATE: 107 BPM | HEIGHT: 66 IN | RESPIRATION RATE: 18 BRPM | TEMPERATURE: 98.1 F | SYSTOLIC BLOOD PRESSURE: 154 MMHG

## 2018-10-17 VITALS
OXYGEN SATURATION: 97 % | BODY MASS INDEX: 37.12 KG/M2 | WEIGHT: 230 LBS | RESPIRATION RATE: 18 BRPM | TEMPERATURE: 98.2 F | SYSTOLIC BLOOD PRESSURE: 144 MMHG | DIASTOLIC BLOOD PRESSURE: 86 MMHG | HEART RATE: 101 BPM

## 2018-10-17 DIAGNOSIS — T87.50: Primary | ICD-10-CM

## 2018-10-17 DIAGNOSIS — R42 LIGHTHEADEDNESS: ICD-10-CM

## 2018-10-17 DIAGNOSIS — E86.0 DEHYDRATION: ICD-10-CM

## 2018-10-17 DIAGNOSIS — R73.9 HYPERGLYCEMIA: Primary | ICD-10-CM

## 2018-10-17 LAB
ALBUMIN SERPL-MCNC: 4 G/DL (ref 3.5–5.1)
ALP BLD-CCNC: 66 U/L (ref 38–126)
ALT SERPL-CCNC: 24 U/L (ref 11–66)
AMPHETAMINE+METHAMPHETAMINE URINE SCREEN: NEGATIVE
ANION GAP SERPL CALCULATED.3IONS-SCNC: 15 MEQ/L (ref 8–16)
AST SERPL-CCNC: 25 U/L (ref 5–40)
BACTERIA: ABNORMAL
BARBITURATE QUANTITATIVE URINE: NEGATIVE
BASOPHILS # BLD: 0.5 %
BASOPHILS ABSOLUTE: 0 THOU/MM3 (ref 0–0.1)
BENZODIAZEPINE QUANTITATIVE URINE: NEGATIVE
BILIRUB SERPL-MCNC: 0.3 MG/DL (ref 0.3–1.2)
BILIRUBIN URINE: NEGATIVE
BLOOD, URINE: NEGATIVE
BUN BLDV-MCNC: 9 MG/DL (ref 7–22)
CALCIUM SERPL-MCNC: 9.4 MG/DL (ref 8.5–10.5)
CANNABINOID QUANTITATIVE URINE: NEGATIVE
CASTS: ABNORMAL /LPF
CASTS: ABNORMAL /LPF
CHARACTER, URINE: CLEAR
CHLORIDE BLD-SCNC: 96 MEQ/L (ref 98–111)
CO2: 22 MEQ/L (ref 23–33)
COCAINE METABOLITE QUANTITATIVE URINE: NEGATIVE
COLOR: YELLOW
CREAT SERPL-MCNC: 0.7 MG/DL (ref 0.4–1.2)
CRYSTALS: ABNORMAL
EKG ATRIAL RATE: 107 BPM
EKG P AXIS: 34 DEGREES
EKG P-R INTERVAL: 154 MS
EKG Q-T INTERVAL: 342 MS
EKG QRS DURATION: 78 MS
EKG QTC CALCULATION (BAZETT): 456 MS
EKG R AXIS: 12 DEGREES
EKG T AXIS: 44 DEGREES
EKG VENTRICULAR RATE: 107 BPM
EOSINOPHIL # BLD: 6.5 %
EOSINOPHILS ABSOLUTE: 0.6 THOU/MM3 (ref 0–0.4)
EPITHELIAL CELLS, UA: ABNORMAL /HPF
ERYTHROCYTE [DISTWIDTH] IN BLOOD BY AUTOMATED COUNT: 15.4 % (ref 11.5–14.5)
ERYTHROCYTE [DISTWIDTH] IN BLOOD BY AUTOMATED COUNT: 47.5 FL (ref 35–45)
GFR SERPL CREATININE-BSD FRML MDRD: > 90 ML/MIN/1.73M2
GLUCOSE BLD-MCNC: 188 MG/DL (ref 70–108)
GLUCOSE BLD-MCNC: 356 MG/DL (ref 70–108)
GLUCOSE BLD-MCNC: 395 MG/DL (ref 70–108)
GLUCOSE BLD-MCNC: 399 MG/DL (ref 70–108)
GLUCOSE, URINE: >= 1000 MG/DL
HCT VFR BLD CALC: 39.1 % (ref 42–52)
HEMOGLOBIN: 13.7 GM/DL (ref 14–18)
IMMATURE GRANS (ABS): 0.09 THOU/MM3 (ref 0–0.07)
IMMATURE GRANULOCYTES: 1 %
KETONES, URINE: NEGATIVE
LACTIC ACID: 2.4 MMOL/L (ref 0.5–2.2)
LACTIC ACID: 3.3 MMOL/L (ref 0.5–2.2)
LEUKOCYTE ESTERASE, URINE: NEGATIVE
LYMPHOCYTES # BLD: 25.9 %
LYMPHOCYTES ABSOLUTE: 2.3 THOU/MM3 (ref 1–4.8)
MAGNESIUM: 1.7 MG/DL (ref 1.6–2.4)
MCH RBC QN AUTO: 30 PG (ref 26–33)
MCHC RBC AUTO-ENTMCNC: 35 GM/DL (ref 32.2–35.5)
MCV RBC AUTO: 85.7 FL (ref 80–94)
MISCELLANEOUS LAB TEST RESULT: ABNORMAL
MONOCYTES # BLD: 8.9 %
MONOCYTES ABSOLUTE: 0.8 THOU/MM3 (ref 0.4–1.3)
NITRITE, URINE: NEGATIVE
NUCLEATED RED BLOOD CELLS: 0 /100 WBC
OPIATES, URINE: NEGATIVE
OSMOLALITY CALCULATION: 279.4 MOSMOL/KG (ref 275–300)
OXYCODONE: NEGATIVE
PH UA: 5
PHENCYCLIDINE QUANTITATIVE URINE: NEGATIVE
PLATELET # BLD: 226 THOU/MM3 (ref 130–400)
PMV BLD AUTO: 9.5 FL (ref 9.4–12.4)
POTASSIUM SERPL-SCNC: 4 MEQ/L (ref 3.5–5.2)
PROTEIN UA: NEGATIVE MG/DL
RBC # BLD: 4.56 MILL/MM3 (ref 4.7–6.1)
RBC URINE: ABNORMAL /HPF
RENAL EPITHELIAL, UA: ABNORMAL
SEG NEUTROPHILS: 57.2 %
SEGMENTED NEUTROPHILS ABSOLUTE COUNT: 5.1 THOU/MM3 (ref 1.8–7.7)
SODIUM BLD-SCNC: 133 MEQ/L (ref 135–145)
SPECIFIC GRAVITY UA: > 1.03 (ref 1–1.03)
T4 FREE: 1.2 NG/DL (ref 0.93–1.76)
TOTAL PROTEIN: 7.8 G/DL (ref 6.1–8)
TSH SERPL DL<=0.05 MIU/L-ACNC: 5.54 UIU/ML (ref 0.4–4.2)
UROBILINOGEN, URINE: 0.2 EU/DL
WBC # BLD: 8.9 THOU/MM3 (ref 4.8–10.8)
WBC UA: ABNORMAL /HPF
YEAST: ABNORMAL

## 2018-10-17 PROCEDURE — 36415 COLL VENOUS BLD VENIPUNCTURE: CPT

## 2018-10-17 PROCEDURE — 83605 ASSAY OF LACTIC ACID: CPT

## 2018-10-17 PROCEDURE — 80053 COMPREHEN METABOLIC PANEL: CPT

## 2018-10-17 PROCEDURE — 93005 ELECTROCARDIOGRAM TRACING: CPT | Performed by: NURSE PRACTITIONER

## 2018-10-17 PROCEDURE — 96372 THER/PROPH/DIAG INJ SC/IM: CPT

## 2018-10-17 PROCEDURE — 6370000000 HC RX 637 (ALT 250 FOR IP): Performed by: NURSE PRACTITIONER

## 2018-10-17 PROCEDURE — 80307 DRUG TEST PRSMV CHEM ANLYZR: CPT

## 2018-10-17 PROCEDURE — 96361 HYDRATE IV INFUSION ADD-ON: CPT

## 2018-10-17 PROCEDURE — 99285 EMERGENCY DEPT VISIT HI MDM: CPT

## 2018-10-17 PROCEDURE — 82948 REAGENT STRIP/BLOOD GLUCOSE: CPT

## 2018-10-17 PROCEDURE — 81001 URINALYSIS AUTO W/SCOPE: CPT

## 2018-10-17 PROCEDURE — 85025 COMPLETE CBC W/AUTO DIFF WBC: CPT

## 2018-10-17 PROCEDURE — 93010 ELECTROCARDIOGRAM REPORT: CPT | Performed by: NUCLEAR MEDICINE

## 2018-10-17 PROCEDURE — 84443 ASSAY THYROID STIM HORMONE: CPT

## 2018-10-17 PROCEDURE — 99213 OFFICE O/P EST LOW 20 MIN: CPT

## 2018-10-17 PROCEDURE — 96360 HYDRATION IV INFUSION INIT: CPT

## 2018-10-17 PROCEDURE — 83735 ASSAY OF MAGNESIUM: CPT

## 2018-10-17 PROCEDURE — 2580000003 HC RX 258: Performed by: NURSE PRACTITIONER

## 2018-10-17 PROCEDURE — 84439 ASSAY OF FREE THYROXINE: CPT

## 2018-10-17 RX ORDER — 0.9 % SODIUM CHLORIDE 0.9 %
1000 INTRAVENOUS SOLUTION INTRAVENOUS ONCE
Status: COMPLETED | OUTPATIENT
Start: 2018-10-17 | End: 2018-10-17

## 2018-10-17 RX ADMIN — SODIUM CHLORIDE 1000 ML: 9 INJECTION, SOLUTION INTRAVENOUS at 11:38

## 2018-10-17 RX ADMIN — Medication 10 UNITS: at 11:37

## 2018-10-17 RX ADMIN — SODIUM CHLORIDE 1000 ML: 9 INJECTION, SOLUTION INTRAVENOUS at 10:45

## 2018-10-17 ASSESSMENT — ENCOUNTER SYMPTOMS
EYE REDNESS: 0
BACK PAIN: 0
VOICE CHANGE: 0
COUGH: 0
SHORTNESS OF BREATH: 0
NAUSEA: 1
VOMITING: 0
SINUS PRESSURE: 0
BLOOD IN STOOL: 0
ABDOMINAL PAIN: 0
RHINORRHEA: 0
COLOR CHANGE: 0
SORE THROAT: 0
CONSTIPATION: 0
ABDOMINAL DISTENTION: 1
CHEST TIGHTNESS: 0
PHOTOPHOBIA: 0
DIARRHEA: 0
WHEEZING: 0

## 2018-10-17 ASSESSMENT — PAIN SCALES - GENERAL: PAINLEVEL_OUTOF10: 0

## 2018-10-17 NOTE — PROGRESS NOTES
tiZANidine (ZANAFLEX) 4 MG tablet Take 1 tablet by mouth 4 times daily 60 tablet 0    mirtazapine (REMERON) 15 MG tablet Take 1 tablet by mouth nightly 30 tablet 0    sertraline (ZOLOFT) 100 MG tablet Take 1.5 tablets by mouth daily 45 tablet 0    acetaminophen (TYLENOL) 500 MG tablet Take 1,000 mg by mouth every 6 hours as needed for Pain      Lancets MISC Use to test blood sugars 4 times daily DX:E11.65 100 each 3    Insulin Syringe-Needle U-100 29G X 1/2\" 0.3 ML MISC 1 each by Does not apply route 4 times daily (after meals and at bedtime) 100 each 1    Insulin Pen Needle 32G X 4 MM MISC 1 each by Does not apply route daily 100 each 0    blood glucose monitor strips Test blood sugars 4 times daily DX:E11.65 400 strip 1    Blood Glucose Monitoring Suppl LIANNE Use to test blood sugars DX:E11.65 1 Device 0     No current facility-administered medications on file prior to encounter. REVIEW OF SYSTEMS:     Constitutional: no fever, no night sweats, no fatigue. Head: no head ache , no head injury, no migranes. Eye: no blurring of vision, no double vision. Ears: no hearing difficulty, no tinnitus  Mouth/throat: no ulceration, dental caries , dysphagia  Lungs: no cough, no shortness of breath, no wheeze  CVS: no palpitation, no chest pain, no shortness of breath  GI: no abdominal pain, no nausea , no vomiting, no constipation  ISMAEL: no dysuria, frequency and urgency, no hematuria, no kidney stones  Musculoskeletal: Right below-knee amputation   Endocrine: no polyuria, polydipsia, no cold or heat intolerance  Hematology: no anemia, no easy brusing or bleeding, no hx of clotting disorder  Dermatology: no skin rash, no eczema, no pruritis,  Psychiatry: History of bipolar disorder        PHYSICAL EXAM      height is 5' 6\" (1.676 m) and weight is 232 lb (105.2 kg). His oral temperature is 98.1 °F (36.7 °C). His blood pressure is 170/79 (abnormal) and his pulse is 109.  His respiration is 18 and oxygen Drainage Amount None 9/20/2018  2:57 PM   Drainage Description CODY 9/21/2018  5:08 PM   Odor None 9/22/2018  2:22 PM   Dressing/Treatment Ace Wrap;Dry dressing;Packing 10/4/2018  7:59 AM   Dressing Changed Changed/New 9/30/2018  9:30 PM   Dressing Status Clean;Dry; Intact 10/4/2018  7:59 AM   Dressing Change Due 09/28/18 9/30/2018  9:30 PM   Number of days: 26       LABS      Lab Results   Component Value Date    BC No growth-preliminary  No growth   09/18/2018       Assessment:    abscess right BKA stump: the wound is clean and granular, will continue moist dresssing  Advised against use of the prosthesis until the wound heals  Follow up will be scehduled  Due to dizziness and tachycardia, he was advised to go to ER. blood glucose was 340   Patient Active Problem List   Diagnosis Code    Status post below knee amputation of right lower extremity ([facility]) Z89.511    Acute blood loss anemia D62    Gait disturbance R26.9    Sebaceous cyst L72.3    Uncontrolled type 2 diabetes mellitus with hyperglycemia, with long-term current use of insulin ([facility]) E11.65, Z79.4    Severe bipolar II disorder, recent episode major depressive, remission ([facility]) F31.81    Bipolar affective disorder ([facility]) F31.9    Leukocytosis D72.829    Lactic acidosis E87.2    Dehydration E86.0    Dehydration with hyponatremia E87.1    Normocytic anemia D64.9    Obesity (BMI 30-39. 9) E66.9    Infection of below knee amputation stump ([facility]) T87.40    History of noncompliance with medical treatment Z91.19    Essential hypertension I10    Sepsis ([facility] A41.9    Diabetic ulcer of lower leg ([facility]) E11.622, L97.909    Uncontrolled type 2 diabetes mellitus with foot ulcer, with long-term current use of insulin ([facility]) E11.621, E11.65, L97.509, Z79.4    History of right below knee amputation ([facility]) Z89.511    Tobacco dependence F17.200    History of osteomyelitis Z87.39    Gastroesophageal reflux disease without esophagitis K21.9    History of

## 2018-10-17 NOTE — ED PROVIDER NOTES
[address]        Chief Complaint   Patient presents with    Hyperglycemia    Palpitations    Hypertension       Nurses Notesreviewed and I agree except as noted in the HPI. HISTORY OF PRESENT ILLNESS   Milly Sexton is a 46 y.o. male who presents to the EDfor evaluation of dizziness and heart palpitations. The patient was here seeing Dr. Modesto Reed when he started feeling dizzy, lightheaded, having heart palpitations and his HR and BP was elevated. Patient was then sent here for further evaluation. He states he has had diarrhea for 8 days. Patient states his diarrhea have been loose and watery. He states it has been brown and black, and smells like it could have been mixed with blood, but denies seeing blood. The patient states he has tried Imodium with no relief. He reports associated abdominal bloating, chills and nausea. He denies vomiting, fever, cough, or cold symptoms. The patient states he is still making urine and his urine is really light. The patient states he had a BKA in 2016. He now has an healing open wound on his knee that he is seeing Dr. Modesto Reed for. Patient states he was on recent IV antibiotics. Patient states he has diabetes and his sugar have been in the 400's. He states his doctor increased his insulin which did not decrease his sugars. The patient states he has been in and out of TCU for rehab of his leg. No further complaints at the time of the initial encounter. Paindescription:  Onset: today  Location: dizziness and palpitations  Duration: intermittent  Aggravating factors: denies  Radiation: denies  Severity: no pain    Experienced previously: No    HPI was provided by the patient. REVIEW OF SYSTEMS     Review of Systems   Constitutional: Positive for chills. Negative for appetite change, diaphoresis, fatigue, fever and unexpected weight change.    HENT: Negative for congestion, hearing loss, postnasal drip, rhinorrhea, sinus pressure, sore throat and voice change. Eyes: Negative for photophobia, redness and visual disturbance. Respiratory: Negative for cough, chest tightness, shortness of breath and wheezing. Cardiovascular: Positive for palpitations. Negative for chest pain. Gastrointestinal: Positive for abdominal distention (bloating) and nausea. Negative for abdominal pain, blood in stool, constipation, diarrhea and vomiting. Endocrine: Negative for cold intolerance, heat intolerance, polydipsia, polyphagia and polyuria. Genitourinary: Negative for decreased urine volume, difficulty urinating, dysuria, flank pain and frequency. Musculoskeletal: Negative for arthralgias, back pain, gait problem, joint swelling, neck pain and neck stiffness. Skin: Negative for color change and rash. Allergic/Immunologic: Negative for immunocompromised state. Neurological: Positive for dizziness and light-headedness. Negative for tremors, weakness, numbness and headaches. Hematological: Does not bruise/bleed easily. Psychiatric/Behavioral: Negative for behavioral problems, confusion, decreased concentration, hallucinations, self-injury and suicidal ideas. The patient is not nervous/anxious. PAST MEDICAL HISTORY    has a past medical history of Bipolar 2 disorder ([address] Diabetes mellitus type 2, uncontrolled ([address] Diabetic foot ulcer ([address] Diabetic polyneuropathy ([address] Diabetic ulcer of right foot associated with type 2 diabetes mellitus ([address] Essential hypertension; GERD (gastroesophageal reflux disease); Hammer toe of left foot; Heart murmur; History of tobacco abuse; Hx of BKA, right ([address] Macular edema, diabetic, bilateral ([address] Marijuana abuse in remission; Onychomycosis; and 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 SURGICAL HISTORY      has a past surgical history that includes other surgical history (Right, 1/14/15); Abscess Drainage (Right); Toe amputation (Right, 1/16/15);  Foot Musculoskeletal: Normal range of motion. Neurological: He is alert and oriented to person, place, and time. Skin: Skin is warm and dry. No rash noted. No erythema. No pallor. Psychiatric: His behavior is normal. Judgment and thought content normal.   Nursing note and vitals reviewed. DIFFERENTIAL DIAGNOSIS:   Dehydration, electrolyte abnormality, hyperglycemia, dysrhythmia     DIAGNOSTIC RESULTS     EKG: All EKG's are interpreted by the Emergency Department Physician who either signs or Co-signs this chart in the absence of a cardiologist.    EKG read and interpreted by myself gives impression of sinus tachycardia with heart rate of 107; interval 154; QRS 78;QTc 456; axis 12. No STEMI     RADIOLOGY: non-plain film images(s) such as CT, Ultrasound and MRI are read by the radiologist.  Plain radiographic imagesare visualized and preliminarily interpreted by the emergency physician unless otherwise stated below.   No orders to display         LABS:   Labs Reviewed   CBC WITH AUTO DIFFERENTIAL - Abnormal; Notable for the following:        Result Value    RBC 4.56 (*)     Hemoglobin 13.7 (*)     Hematocrit 39.1 (*)     RDW-CV 15.4 (*)     RDW-SD 47.5 (*)     Eosinophils # 0.6 (*)     Immature Grans (Abs) 0.09 (*)     All other components within normal limits   COMPREHENSIVE METABOLIC PANEL - Abnormal; Notable for the following:     Glucose 356 (*)     Sodium 133 (*)     Chloride 96 (*)     CO2 22 (*)     All other components within normal limits   LACTIC ACID, PLASMA - Abnormal; Notable for the following:     Lactic Acid 3.3 (*)     All other components within normal limits   TSH WITHOUT REFLEX - Abnormal; Notable for the following:     TSH 5.540 (*)     All other components within normal limits   URINALYSIS WITH MICROSCOPIC - Abnormal; Notable for the following:     Glucose, Urine >= 1000 (*)     Specific Gravity, UA >1.030 (*)     All other components within normal limits   LACTIC ACID, PLASMA - Abnormal; Notable for the following:     Lactic Acid 2.4 (*)     All other components within normal limits   POCT GLUCOSE - Abnormal; Notable for the following:     POC Glucose 395 (*)     All other components within normal limits   POCT GLUCOSE - Abnormal; Notable for the following:     POC Glucose 188 (*)     All other components within normal limits   MAGNESIUM   URINE DRUG SCREEN   ANION GAP   GLOMERULAR FILTRATION RATE, ESTIMATED   OSMOLALITY   T4, FREE   POCT GLUCOSE   POCT GLUCOSE   POCT GLUCOSE   POCT GLUCOSE       EMERGENCY DEPARTMENT COURSE:   Vitals:    Vitals:    10/17/18 1001 10/17/18 1045 10/17/18 1319   BP: (!) 153/81 131/73 (!) 144/86   Pulse: 109 105 101   Resp: 20  18   Temp: 98.2 °F (36.8 °C)     TempSrc: Oral     SpO2: 98% 97%    Weight: 230 lb (104.3 kg)         MDM  The patient presents to the ED with complaints of dizziness, lightheadedness and heart palpitations. The patient was not in any acute distress. The patient's physical exam showed abdominal bloating, but soft non-tender abdomen. Patient was tachycardiac. Patient's status improved during the duration of their stay. Labs were ordered, and reviewed with the patient. TSH was elevated at 5.540. With normal t4. Lactic acid was elevated at 3.3 and then went down to 2.4. POC glucose was 399 then went down to 188. EKG showed sinus tachycardia. Previous records reviewed HR averages around 90. Patient was asymptomatic upon reevaluation. I explained my proposed course of treatment with the patient, and they were amenable to my decision. The patient will be discharged home, and will need to follow-up with MARILYN De La Rosa CNP in 2 days. The patient will need to come back to the ER if their symptoms worsen, or become more severe in nature.      Medications   0.9 % sodium chloride bolus (0 mLs Intravenous Stopped 10/17/18 1319)   insulin regular (HUMULIN R;NOVOLIN R) injection 10 Units (10 Units Subcutaneous Given 10/17/18 1137)   0.9 % sodium chloride

## 2018-10-17 NOTE — PLAN OF CARE
Problem: Wound:  Goal: Will show signs of wound healing; wound closure and no evidence of infection  Will show signs of wound healing; wound closure and no evidence of infection   Outcome: Ongoing  Pt presents to wound clinic for new return of previous right stump wound that has not healed. Wound measures smaller in size form last appt. No s/s of infection. Pt was afebrile. No new skin breakdown noted. Faxed and emailed [facility] new orders. Cleansed wound with normal saline and gauze. Patted dry with clean gauze. Applied saline moist gauze. Covered with abd pad. Pt advised to Change daily. Pt taken to ER by wheelchair for symptoms noted in nursing progress note. Next appt in 1 month. Comments:     Care plan reviewed with patient. Patient verbalize understanding of the plan of care and contribute to goal setting.

## 2018-10-18 ENCOUNTER — TELEPHONE (OUTPATIENT)
Dept: INTERNAL MEDICINE CLINIC | Age: 48
End: 2018-10-18

## 2018-10-20 ENCOUNTER — HOSPITAL ENCOUNTER (OUTPATIENT)
Age: 48
Setting detail: SPECIMEN
Discharge: HOME | End: 2018-10-20
Payer: MEDICARE

## 2018-10-20 PROCEDURE — 87205 SMEAR GRAM STAIN: CPT

## 2018-10-20 PROCEDURE — 87070 CULTURE OTHR SPECIMN AEROBIC: CPT

## 2018-10-20 PROCEDURE — 87147 CULTURE TYPE IMMUNOLOGIC: CPT

## 2018-10-20 PROCEDURE — 87186 SC STD MICRODIL/AGAR DIL: CPT

## 2018-10-20 PROCEDURE — 87077 CULTURE AEROBIC IDENTIFY: CPT

## 2018-10-22 ENCOUNTER — APPOINTMENT (OUTPATIENT)
Dept: GENERAL RADIOLOGY | Age: 48
End: 2018-10-22
Payer: MEDICARE

## 2018-10-22 ENCOUNTER — OFFICE VISIT (OUTPATIENT)
Dept: FAMILY MEDICINE CLINIC | Age: 48
End: 2018-10-22
Payer: MEDICARE

## 2018-10-22 ENCOUNTER — HOSPITAL ENCOUNTER (EMERGENCY)
Age: 48
Discharge: HOME | End: 2018-10-22
Attending: EMERGENCY MEDICINE
Payer: MEDICARE

## 2018-10-22 VITALS
WEIGHT: 233.6 LBS | HEART RATE: 109 BPM | SYSTOLIC BLOOD PRESSURE: 126 MMHG | BODY MASS INDEX: 37.54 KG/M2 | OXYGEN SATURATION: 97 % | HEIGHT: 66 IN | DIASTOLIC BLOOD PRESSURE: 68 MMHG | RESPIRATION RATE: 12 BRPM

## 2018-10-22 VITALS
TEMPERATURE: 98.4 F | HEART RATE: 111 BPM | WEIGHT: 232 LBS | HEIGHT: 66 IN | SYSTOLIC BLOOD PRESSURE: 148 MMHG | RESPIRATION RATE: 17 BRPM | DIASTOLIC BLOOD PRESSURE: 76 MMHG | BODY MASS INDEX: 37.28 KG/M2 | OXYGEN SATURATION: 96 %

## 2018-10-22 DIAGNOSIS — E08.65 DIABETES MELLITUS DUE TO UNDERLYING CONDITION WITH HYPERGLYCEMIA, WITH LONG-TERM CURRENT USE OF INSULIN (HCC): ICD-10-CM

## 2018-10-22 DIAGNOSIS — E11.9 INSULIN-REQUIRING OR DEPENDENT TYPE II DIABETES MELLITUS (HCC): ICD-10-CM

## 2018-10-22 DIAGNOSIS — Z79.4 DIABETES MELLITUS DUE TO UNDERLYING CONDITION WITH HYPERGLYCEMIA, WITH LONG-TERM CURRENT USE OF INSULIN (HCC): ICD-10-CM

## 2018-10-22 DIAGNOSIS — E11.65 UNCONTROLLED TYPE 2 DIABETES MELLITUS WITH HYPERGLYCEMIA, WITH LONG-TERM CURRENT USE OF INSULIN (HCC): Primary | Chronic | ICD-10-CM

## 2018-10-22 DIAGNOSIS — L02.415 ABSCESS OF RIGHT LEG: ICD-10-CM

## 2018-10-22 DIAGNOSIS — E11.622 DIABETIC ULCER OF LOWER LEG (HCC): Primary | ICD-10-CM

## 2018-10-22 DIAGNOSIS — L97.909 DIABETIC ULCER OF LOWER LEG (HCC): Primary | ICD-10-CM

## 2018-10-22 DIAGNOSIS — Z79.4 UNCONTROLLED TYPE 2 DIABETES MELLITUS WITH HYPERGLYCEMIA, WITH LONG-TERM CURRENT USE OF INSULIN (HCC): Primary | Chronic | ICD-10-CM

## 2018-10-22 DIAGNOSIS — Z79.4 INSULIN-REQUIRING OR DEPENDENT TYPE II DIABETES MELLITUS (HCC): ICD-10-CM

## 2018-10-22 LAB
AEROBIC CULTURE: ABNORMAL
ANION GAP SERPL CALCULATED.3IONS-SCNC: 18 MEQ/L (ref 8–16)
AVERAGE GLUCOSE: 234 MG/DL (ref 70–126)
BASOPHILS # BLD: 0.6 %
BASOPHILS ABSOLUTE: 0.1 THOU/MM3 (ref 0–0.1)
BETA-HYDROXYBUTYRATE: 2.15 MG/DL (ref 0.2–2.81)
BUN BLDV-MCNC: 15 MG/DL (ref 7–22)
CALCIUM SERPL-MCNC: 9.2 MG/DL (ref 8.5–10.5)
CHLORIDE BLD-SCNC: 90 MEQ/L (ref 98–111)
CO2: 23 MEQ/L (ref 23–33)
CREAT SERPL-MCNC: 0.8 MG/DL (ref 0.4–1.2)
EOSINOPHIL # BLD: 4.1 %
EOSINOPHILS ABSOLUTE: 0.5 THOU/MM3 (ref 0–0.4)
ERYTHROCYTE [DISTWIDTH] IN BLOOD BY AUTOMATED COUNT: 15.9 % (ref 11.5–14.5)
ERYTHROCYTE [DISTWIDTH] IN BLOOD BY AUTOMATED COUNT: 52.3 FL (ref 35–45)
GFR SERPL CREATININE-BSD FRML MDRD: > 90 ML/MIN/1.73M2
GLUCOSE BLD-MCNC: 392 MG/DL
GLUCOSE BLD-MCNC: 526 MG/DL (ref 70–108)
GRAM STAIN RESULT: ABNORMAL
HBA1C MFR BLD: 9.8 % (ref 4.4–6.4)
HCT VFR BLD CALC: 43.1 % (ref 42–52)
HEMOGLOBIN: 14.2 GM/DL (ref 14–18)
IMMATURE GRANS (ABS): 0.08 THOU/MM3 (ref 0–0.07)
IMMATURE GRANULOCYTES: 0.7 %
LYMPHOCYTES # BLD: 24.4 %
LYMPHOCYTES ABSOLUTE: 2.7 THOU/MM3 (ref 1–4.8)
MCH RBC QN AUTO: 29.9 PG (ref 26–33)
MCHC RBC AUTO-ENTMCNC: 32.9 GM/DL (ref 32.2–35.5)
MCV RBC AUTO: 90.7 FL (ref 80–94)
MONOCYTES # BLD: 7.9 %
MONOCYTES ABSOLUTE: 0.9 THOU/MM3 (ref 0.4–1.3)
NUCLEATED RED BLOOD CELLS: 0 /100 WBC
ORGANISM: ABNORMAL
ORGANISM: ABNORMAL
OSMOLALITY CALCULATION: 287.2 MOSMOL/KG (ref 275–300)
PLATELET # BLD: 227 THOU/MM3 (ref 130–400)
PMV BLD AUTO: 9.7 FL (ref 9.4–12.4)
POTASSIUM SERPL-SCNC: 4.9 MEQ/L (ref 3.5–5.2)
RBC # BLD: 4.75 MILL/MM3 (ref 4.7–6.1)
SEG NEUTROPHILS: 62.3 %
SEGMENTED NEUTROPHILS ABSOLUTE COUNT: 6.9 THOU/MM3 (ref 1.8–7.7)
SODIUM BLD-SCNC: 131 MEQ/L (ref 135–145)
WBC # BLD: 11.1 THOU/MM3 (ref 4.8–10.8)

## 2018-10-22 PROCEDURE — 3017F COLORECTAL CA SCREEN DOC REV: CPT | Performed by: NURSE PRACTITIONER

## 2018-10-22 PROCEDURE — 1111F DSCHRG MED/CURRENT MED MERGE: CPT | Performed by: NURSE PRACTITIONER

## 2018-10-22 PROCEDURE — 82010 KETONE BODYS QUAN: CPT

## 2018-10-22 PROCEDURE — G8427 DOCREV CUR MEDS BY ELIG CLIN: HCPCS | Performed by: NURSE PRACTITIONER

## 2018-10-22 PROCEDURE — 99213 OFFICE O/P EST LOW 20 MIN: CPT | Performed by: NURSE PRACTITIONER

## 2018-10-22 PROCEDURE — 73560 X-RAY EXAM OF KNEE 1 OR 2: CPT

## 2018-10-22 PROCEDURE — G8417 CALC BMI ABV UP PARAM F/U: HCPCS | Performed by: NURSE PRACTITIONER

## 2018-10-22 PROCEDURE — 1036F TOBACCO NON-USER: CPT | Performed by: NURSE PRACTITIONER

## 2018-10-22 PROCEDURE — 80048 BASIC METABOLIC PNL TOTAL CA: CPT

## 2018-10-22 PROCEDURE — 99283 EMERGENCY DEPT VISIT LOW MDM: CPT

## 2018-10-22 PROCEDURE — 3046F HEMOGLOBIN A1C LEVEL >9.0%: CPT | Performed by: NURSE PRACTITIONER

## 2018-10-22 PROCEDURE — 83036 HEMOGLOBIN GLYCOSYLATED A1C: CPT

## 2018-10-22 PROCEDURE — 82962 GLUCOSE BLOOD TEST: CPT | Performed by: NURSE PRACTITIONER

## 2018-10-22 PROCEDURE — G8484 FLU IMMUNIZE NO ADMIN: HCPCS | Performed by: NURSE PRACTITIONER

## 2018-10-22 PROCEDURE — 6370000000 HC RX 637 (ALT 250 FOR IP): Performed by: EMERGENCY MEDICINE

## 2018-10-22 PROCEDURE — 85025 COMPLETE CBC W/AUTO DIFF WBC: CPT

## 2018-10-22 PROCEDURE — 36415 COLL VENOUS BLD VENIPUNCTURE: CPT

## 2018-10-22 PROCEDURE — 2022F DILAT RTA XM EVC RTNOPTHY: CPT | Performed by: NURSE PRACTITIONER

## 2018-10-22 RX ORDER — CIPROFLOXACIN 500 MG/1
500 TABLET, FILM COATED ORAL ONCE
Status: COMPLETED | OUTPATIENT
Start: 2018-10-22 | End: 2018-10-22

## 2018-10-22 RX ORDER — CEPHALEXIN 500 MG/1
500 CAPSULE ORAL 3 TIMES DAILY
Qty: 30 CAPSULE | Refills: 0 | Status: SHIPPED | OUTPATIENT
Start: 2018-10-22 | End: 2018-11-07 | Stop reason: ALTCHOICE

## 2018-10-22 RX ORDER — CEPHALEXIN 250 MG/1
500 CAPSULE ORAL ONCE
Status: COMPLETED | OUTPATIENT
Start: 2018-10-22 | End: 2018-10-22

## 2018-10-22 RX ORDER — CIPROFLOXACIN 500 MG/1
500 TABLET, FILM COATED ORAL 2 TIMES DAILY
Qty: 20 TABLET | Refills: 0 | Status: SHIPPED | OUTPATIENT
Start: 2018-10-22 | End: 2018-11-01

## 2018-10-22 RX ADMIN — CEPHALEXIN 500 MG: 250 CAPSULE ORAL at 18:58

## 2018-10-22 RX ADMIN — MUPIROCIN: 20 OINTMENT TOPICAL at 18:59

## 2018-10-22 RX ADMIN — CIPROFLOXACIN HYDROCHLORIDE 500 MG: 500 TABLET, FILM COATED ORAL at 18:59

## 2018-10-22 ASSESSMENT — ENCOUNTER SYMPTOMS
NAUSEA: 0
EYE PAIN: 0
VOMITING: 0
COUGH: 0
EYE DISCHARGE: 0
SORE THROAT: 0
CONSTIPATION: 0
ABDOMINAL PAIN: 0
VOMITING: 0
WHEEZING: 0
CONSTIPATION: 0
BACK PAIN: 0
SORE THROAT: 0
BACK PAIN: 0
RHINORRHEA: 0
DIARRHEA: 0
COLOR CHANGE: 0
DIARRHEA: 0
NAUSEA: 0
WHEEZING: 0
STRIDOR: 0
COUGH: 0
SHORTNESS OF BREATH: 0
BLOOD IN STOOL: 0
ABDOMINAL PAIN: 0
RHINORRHEA: 0
SHORTNESS OF BREATH: 0

## 2018-10-22 NOTE — PROGRESS NOTES
Does not apply route 4 times daily (after meals and at bedtime) 100 each 1    Insulin Pen Needle 32G X 4 MM MISC 1 each by Does not apply route daily 100 each 0    blood glucose monitor strips Test blood sugars 4 times daily DX:E11.65 400 strip 1    mirtazapine (REMERON) 15 MG tablet Take 1 tablet by mouth nightly 30 tablet 0    sertraline (ZOLOFT) 100 MG tablet Take 1.5 tablets by mouth daily 45 tablet 0    Blood Glucose Monitoring Suppl LIANNE Use to test blood sugars DX:E11.65 1 Device 0        Medications patient taking asof now reconciled against medications ordered at time of hospital discharge: yes    Chief Complaint   Patient presents with    Follow-Up from [address] [place] [address] 10/17/2018 for abscess on Rt leg with infection and swelling with redness    Other     needs okay for new prosthetic leg    Hyperglycemia     states cannot get blood sugar under control; fluctuating from 300's to 500's       HPI    Inpatient course: Discharge summary reviewed- see chart. Interval history/Current status: Stable    Review of Systems   Constitutional: Negative for activity change, appetite change, chills, fatigue and fever. HENT: Negative for congestion, ear pain, rhinorrhea and sore throat. Eyes: Negative for pain and discharge. Respiratory: Negative for cough, shortness of breath, wheezing and stridor. Cardiovascular: Negative for chest pain. Gastrointestinal: Negative for abdominal pain, constipation, diarrhea, nausea and vomiting. Genitourinary: Negative for dysuria, flank pain and frequency. Musculoskeletal: Negative for arthralgias, back pain, myalgias and neck pain. Skin: Positive for wound. Negative for color change and rash. Allergic/Immunologic: Negative for immunocompromised state. Neurological: Negative for dizziness, weakness and headaches. Psychiatric/Behavioral: Negative.         Vitals:    10/22/18 1232   BP: 126/68   Site: Right Upper Arm   Position: Sitting   Cuff Size: treatment is indicated at this time for the patient as he has an appointment with infectious disease in 2 days. He also has a psychiatric physician appointment coming up within the next week. He states that he is doing well with his bipolar status with no depression or anxiety. Patient is also attending the wound clinic sessions for follow-up on his knee wound. He also has an appointment for gastroenterology for colonoscopy. Due to patient's ongoing hyperglycemia in light of his proper use of the insulin sliding scale, we will add metformin 1000 mg twice a day to see if this will help bring him back into normal range. Patient states he's been on metformin before and it seemed to be effective with little side effect.     Medical Decision Making: moderate complexity           I

## 2018-10-22 NOTE — ED PROVIDER NOTES
blood sugars 4 times daily DX:E11.65      Insulin Syringe-Needle U-100 29G X 1/2\" 0.3 ML MISC 4 TIMES DAILY AFTER MEALS AND BEFORE BEDTIME Starting Thu 2018, Disp-100 each, R-1, Print      Insulin Pen Needle 32G X 4 MM MISC DAILY Starting u 2018, Disp-100 each, R-0, Print      blood glucose monitor strips Test blood sugars 4 times daily DX:E11.65, Disp-400 strip, R-1, Print      mirtazapine (REMERON) 15 MG tablet Take 1 tablet by mouth nightly, Disp-30 tablet, R-0Normal      sertraline (ZOLOFT) 100 MG tablet Take 1.5 tablets by mouth daily, Disp-45 tablet, R-0Normal      Blood Glucose Monitoring Suppl LIANNE Disp-1 Device, R-0, NormalUse to test blood sugars DX:E11.65             ALLERGIES    is allergic to pcn [penicillins] and clindamycin/lincomycin. FAMILY HISTORY    indicated that his mother is . He reported the following about his father: unknown. He indicated that the status of his maternal grandmother is unknown. He indicated that the status of his maternal grandfather is unknown.    family history includes Arthritis in his maternal grandfather; Depression in his mother; Diabetes in his mother; Early Death in his mother; Heart Disease in his maternal grandfather; High Blood Pressure in his mother; High Cholesterol in his mother; Other in his mother; Vision Loss in his maternal grandmother. SOCIAL HISTORY     reports that he quit smoking about 2 months ago. His smoking use included Cigars. He started smoking about 8 months ago. He has a 65.00 pack-year smoking history. He has never used smokeless tobacco. He reports that he does not drink alcohol or use drugs. PHYSICAL EXAM      INITIAL VITALS: BP (!) 148/76   Pulse 111   Temp 98.4 °F (36.9 °C) (Oral)   Resp 17   Ht 5' 6\" (1.676 m)   Wt 232 lb (105.2 kg)   SpO2 96%   BMI 37.45 kg/m² Estimated body mass index is 37.45 kg/m² as calculated from the following:    Height as of this encounter: 5' 6\" (1.676 m).     Weight as of this

## 2018-10-23 ENCOUNTER — OFFICE VISIT (OUTPATIENT)
Dept: INTERNAL MEDICINE CLINIC | Age: 48
End: 2018-10-23
Payer: MEDICARE

## 2018-10-23 ENCOUNTER — TELEPHONE (OUTPATIENT)
Dept: INTERNAL MEDICINE CLINIC | Age: 48
End: 2018-10-23

## 2018-10-23 VITALS — BODY MASS INDEX: 37.61 KG/M2 | WEIGHT: 233 LBS

## 2018-10-23 DIAGNOSIS — Z79.4 UNCONTROLLED TYPE 2 DIABETES MELLITUS WITH HYPERGLYCEMIA, WITH LONG-TERM CURRENT USE OF INSULIN (HCC): Chronic | ICD-10-CM

## 2018-10-23 DIAGNOSIS — E11.65 POORLY CONTROLLED TYPE 2 DIABETES MELLITUS (HCC): ICD-10-CM

## 2018-10-23 DIAGNOSIS — E11.65 UNCONTROLLED TYPE 2 DIABETES MELLITUS WITH HYPERGLYCEMIA, WITH LONG-TERM CURRENT USE OF INSULIN (HCC): Chronic | ICD-10-CM

## 2018-10-23 PROCEDURE — G0108 DIAB MANAGE TRN  PER INDIV: HCPCS | Performed by: INTERNAL MEDICINE

## 2018-10-23 PROCEDURE — 99999 PR OFFICE/OUTPT VISIT,PROCEDURE ONLY: CPT | Performed by: INTERNAL MEDICINE

## 2018-10-23 RX ORDER — GLUCOSAMINE HCL/CHONDROITIN SU 500-400 MG
CAPSULE ORAL
Qty: 400 STRIP | Refills: 5 | Status: SHIPPED | OUTPATIENT
Start: 2018-10-23 | End: 2019-10-08 | Stop reason: SDUPTHER

## 2018-10-23 NOTE — PROGRESS NOTES
generally unhealthy (states he never talked to a RD) diet. Diabetic meal planning: had been drinking reg pop and eating sweets. He has not had a previous visit with a dietitian. He participates in exercise intermittently. He monitors blood glucose at home 3-4 x per day. Blood glucose monitoring compliance: no records. His home blood glucose trend is increasing steadily. An ACE inhibitor/angiotensin II receptor blocker is not being taken. He does not see a podiatrist.Eye exam is current. Focus:     MEAL PLAN:  9a-10a skips                Sausage, pancakes eggs, water                 occ donut  1p peanut butter or other sandwich       Soup/crackers(8-9)       2 frozen pizzas       [employer] double cheeseburgers       Sub way- flatbread/tuna salad       Diet /reg pop  4p  Snack cake         Can of pears  5p   2 cans of soup         Spaghetti          Steak/corn/potatoes  8p shot ( may take 3rd shot of novolog if skipped a meal or BS is high)    Rosamond Paget was sent to the ED yesterday after seeing PCP for wound infection and hyperglycemia. Was released and is here for scheduled Diabetes Clinic visit. BS is 366mg after lunch. He is out of test strips so he has not metered today. He states his BS have been over 300mg, consistently during infection. Is seeing wound clinic and GI tomorrow. Contacted PCP and office to request insulin increases and refill for testing strips so correction scale for humalog can be used correctly. After last admission was to be started on metformin, Rosamond Paget never started. Currently having a lot of pain in his wound. Is walking on prosthesis. Taking antibiotics, but patient is very concerned. Teaching done on low carbohydrate diet and healthy food choices to better manage BS control, insulin resistance, BS goals, and prevention of DKA. States he has not had formal diabetes education in the past. Agreed to plan.         DSME PLAN:   Discussed general issues about diabetes pathophysiology and

## 2018-10-24 ENCOUNTER — HOSPITAL ENCOUNTER (OUTPATIENT)
Dept: WOUND CARE | Age: 48
Discharge: HOME | End: 2018-10-24
Payer: MEDICARE

## 2018-10-24 ENCOUNTER — TELEPHONE (OUTPATIENT)
Dept: INTERNAL MEDICINE CLINIC | Age: 48
End: 2018-10-24

## 2018-10-24 ENCOUNTER — OFFICE VISIT (OUTPATIENT)
Dept: INTERNAL MEDICINE CLINIC | Age: 48
End: 2018-10-24
Payer: MEDICARE

## 2018-10-24 VITALS
RESPIRATION RATE: 12 BRPM | DIASTOLIC BLOOD PRESSURE: 76 MMHG | HEART RATE: 100 BPM | HEIGHT: 66 IN | SYSTOLIC BLOOD PRESSURE: 142 MMHG | BODY MASS INDEX: 37.09 KG/M2 | WEIGHT: 230.8 LBS

## 2018-10-24 VITALS
BODY MASS INDEX: 37.45 KG/M2 | HEIGHT: 66 IN | DIASTOLIC BLOOD PRESSURE: 77 MMHG | HEART RATE: 96 BPM | RESPIRATION RATE: 18 BRPM | WEIGHT: 233 LBS | OXYGEN SATURATION: 100 % | SYSTOLIC BLOOD PRESSURE: 176 MMHG | TEMPERATURE: 97.6 F

## 2018-10-24 DIAGNOSIS — Z79.4 TYPE 2 DIABETES MELLITUS WITH COMPLICATION, WITH LONG-TERM CURRENT USE OF INSULIN (HCC): Primary | ICD-10-CM

## 2018-10-24 DIAGNOSIS — L02.415 ABSCESS OF RIGHT LEG: ICD-10-CM

## 2018-10-24 DIAGNOSIS — R19.7 DIARRHEA, UNSPECIFIED TYPE: ICD-10-CM

## 2018-10-24 DIAGNOSIS — T87.50: Primary | ICD-10-CM

## 2018-10-24 DIAGNOSIS — T87.40 INFECTION OF BELOW KNEE AMPUTATION STUMP (HCC): ICD-10-CM

## 2018-10-24 DIAGNOSIS — E11.8 TYPE 2 DIABETES MELLITUS WITH COMPLICATION, WITH LONG-TERM CURRENT USE OF INSULIN (HCC): Primary | ICD-10-CM

## 2018-10-24 DIAGNOSIS — R35.1 NOCTURIA: Primary | ICD-10-CM

## 2018-10-24 PROCEDURE — 1111F DSCHRG MED/CURRENT MED MERGE: CPT | Performed by: INTERNAL MEDICINE

## 2018-10-24 PROCEDURE — 99212 OFFICE O/P EST SF 10 MIN: CPT

## 2018-10-24 PROCEDURE — 99203 OFFICE O/P NEW LOW 30 MIN: CPT | Performed by: INTERNAL MEDICINE

## 2018-10-24 PROCEDURE — G8427 DOCREV CUR MEDS BY ELIG CLIN: HCPCS | Performed by: INTERNAL MEDICINE

## 2018-10-24 PROCEDURE — 1036F TOBACCO NON-USER: CPT | Performed by: INTERNAL MEDICINE

## 2018-10-24 PROCEDURE — G8484 FLU IMMUNIZE NO ADMIN: HCPCS | Performed by: INTERNAL MEDICINE

## 2018-10-24 PROCEDURE — G8417 CALC BMI ABV UP PARAM F/U: HCPCS | Performed by: INTERNAL MEDICINE

## 2018-10-24 PROCEDURE — 3017F COLORECTAL CA SCREEN DOC REV: CPT | Performed by: INTERNAL MEDICINE

## 2018-10-24 RX ORDER — POLYETHYLENE GLYCOL 3350 17 G/17G
250 POWDER, FOR SOLUTION ORAL DAILY
Qty: 250 BOTTLE | Refills: 0 | Status: SHIPPED | OUTPATIENT
Start: 2018-10-24 | End: 2018-10-25

## 2018-10-24 ASSESSMENT — PAIN SCALES - GENERAL: PAINLEVEL_OUTOF10: 0

## 2018-10-24 NOTE — TELEPHONE ENCOUNTER
Alexis Grimes seeing Dr. Sherita Linder in Internal med office. CLAUDINE made him aware from ysterday's appt with the Diabetes Clinic that scripts for glucose testing strips and insulin were sent to [facility]. He states there is a copy of 6.70 that he cannot afford. Checked with Kelli Max and he agreed to sign for a mercy action. Call to Jagruti in [facility] and she explained sgm strip would need to go to a different community pharmacy that can bill his part B insurance. They will give Alexis Grimes an agametrix kit with 10 strips to check his BS today. Was going to make Alexis Grimes aware of this and check his BS, but he left the office. CLAUDINE left message on his VM to call clinic and return [facility] for assistance. Jagruti in pharmacy is aware.

## 2018-10-24 NOTE — PROGRESS NOTES
[facility]  [employer] Naveen Campbell MD, [facility]  Gastroenterology  [address], [facility] [employer] [facility]  Dept: [phone]  Dept Fax: [phone]     Patient: Christina Mccrary : 1970   Med Rec#: [id number] Acc#: ,[id number]    Primary Care Provider MARILYN Salamanca - CNP   HISTORY OF PRESENT ILLNESS:    The patient is a 46 y.o.   male with significant past medical history of DM x 25 years in poor control post BK amputation  Rt. Side. With diarrhea. Has abscess on multiple antibiotics . Neg stool studies for infection and C diff. who presents with complains of diarrhea. Onset of diarrhea was several weeks ago. Diarrhea is occurring approximately 6 times per day. Patient describes diarrhea as watery. Diarrhea has been associated with eating . Patient denies blood in stool. Previous visits for diarrhea: none. Evaluation to date: none. Wt Readings from Last 3 Encounters:   10/24/18 230 lb 12.8 oz (104.7 kg)   10/24/18 233 lb (105.7 kg)   10/23/18 233 lb (105.7 kg)   . The patient does not have a family history of colon cancer. Past Medical History:      has a past medical history of Bipolar 2 disorder ([address] Diabetes mellitus type 2, uncontrolled ([address] Diabetic foot ulcer ([address] Diabetic polyneuropathy ([address] Diabetic ulcer of right foot associated with type 2 diabetes mellitus ([address] Essential hypertension; GERD (gastroesophageal reflux disease); Hammer toe of left foot; Heart murmur; History of tobacco abuse; Hx of BKA, right ([address] Macular edema, diabetic, bilateral ([address] Marijuana abuse in remission; Onychomycosis; and 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 Past Surgical History:      has a past surgical history that includes other surgical history (Right, 1/14/15); Abscess Drainage (Right); Toe amputation (Right, 1/16/15); Foot Debridement (Right, 2016); Leg amputation below knee (Right, 2016);  [place]

## 2018-10-24 NOTE — PROGRESS NOTES
SHOULDER INCISION AND DRAINAGE performed by Krishna Brandt MD at [facility] OFFICE/OUTPT [address] Right 9/20/2018    EXCISIONAL DEBRIDEMENT RIGHT BKA STUMP performed by Ellsworth Harada, MD at [address] [place] [address] Drive Right 1/16/15    2nd toe with wound vac applied    WISDOM TOOTH EXTRACTION  ? when     FAMILY HISTORY         Family History   Problem Relation Age of Onset    Diabetes Mother     Other Mother         pneumonia, H1N1    Depression Mother     Early Death Mother     High Blood Pressure Mother     High Cholesterol Mother     Vision Loss Maternal Grandmother     Arthritis Maternal Grandfather     Heart Disease Maternal Grandfather      SOCIAL HISTORY       Social History   Substance Use Topics    Smoking status: Former Smoker     Packs/day: 5.00     Years: 13.00     Types: Cigars     Start date: 3/1/2018     Quit date: 9/1/2018    Smokeless tobacco: Never Used      Comment: 5 cigars a day    Alcohol use No      Comment: last drink \"several years ago\"     ALLERGIES         Allergies   Allergen Reactions    Pcn [Penicillins] Shortness Of Breath, Other (See Comments) and Nausea And Vomiting     Does not remember reactions    Clindamycin/Lincomycin Itching     MEDICATIONS         Current Outpatient Prescriptions on File Prior to Encounter   Medication Sig Dispense Refill    blood glucose monitor strips Accucheck Sheila Test Strips Test blood sugars 4 times daily DX:E11.65 400 strip 5    insulin glargine (BASAGLAR KWIKPEN) 100 UNIT/ML injection pen Inject 60 Units into the skin 2 times daily 108 mL 3    insulin lispro (HUMALOG) 100 UNIT/ML injection vial Inject 20-25 Units into the skin 3 times daily (before meals) Plus scale-max 150 units per day 6750 Units 3    ciprofloxacin (CIPRO) 500 MG tablet Take 1 tablet by mouth 2 times daily for 10 days 20 tablet 0    cephALEXin (KEFLEX) 500 MG capsule Take 1 capsule by mouth 3 times daily 30 capsule 0    sodium hypochlorite

## 2018-10-24 NOTE — TELEPHONE ENCOUNTER
Humalog is not covered on insurance. Insurance prefers [employer]. Can this be switched? Please advise.

## 2018-10-28 ENCOUNTER — NURSE TRIAGE (OUTPATIENT)
Dept: ADMINISTRATIVE | Age: 48
End: 2018-10-28

## 2018-10-31 ENCOUNTER — TELEPHONE (OUTPATIENT)
Dept: FAMILY MEDICINE CLINIC | Age: 48
End: 2018-10-31

## 2018-11-06 ENCOUNTER — OFFICE VISIT (OUTPATIENT)
Dept: FAMILY MEDICINE CLINIC | Age: 48
End: 2018-11-06
Payer: MEDICARE

## 2018-11-06 VITALS
RESPIRATION RATE: 18 BRPM | HEART RATE: 96 BPM | DIASTOLIC BLOOD PRESSURE: 68 MMHG | SYSTOLIC BLOOD PRESSURE: 131 MMHG | WEIGHT: 230.2 LBS | HEIGHT: 67 IN | BODY MASS INDEX: 36.13 KG/M2

## 2018-11-06 DIAGNOSIS — T87.40 INFECTION OF BELOW KNEE AMPUTATION STUMP (HCC): ICD-10-CM

## 2018-11-06 DIAGNOSIS — G56.92 NEUROPATHY OF HAND, LEFT: ICD-10-CM

## 2018-11-06 DIAGNOSIS — E11.65 UNCONTROLLED TYPE 2 DIABETES MELLITUS WITH HYPERGLYCEMIA, WITH LONG-TERM CURRENT USE OF INSULIN (HCC): ICD-10-CM

## 2018-11-06 DIAGNOSIS — Z79.4 UNCONTROLLED TYPE 2 DIABETES MELLITUS WITH HYPERGLYCEMIA, WITH LONG-TERM CURRENT USE OF INSULIN (HCC): ICD-10-CM

## 2018-11-06 DIAGNOSIS — G56.91 NEUROPATHY OF HAND, RIGHT: ICD-10-CM

## 2018-11-06 DIAGNOSIS — Z09 HOSPITAL DISCHARGE FOLLOW-UP: Primary | ICD-10-CM

## 2018-11-06 PROCEDURE — 99214 OFFICE O/P EST MOD 30 MIN: CPT | Performed by: NURSE PRACTITIONER

## 2018-11-06 PROCEDURE — 3046F HEMOGLOBIN A1C LEVEL >9.0%: CPT | Performed by: NURSE PRACTITIONER

## 2018-11-06 PROCEDURE — G8417 CALC BMI ABV UP PARAM F/U: HCPCS | Performed by: NURSE PRACTITIONER

## 2018-11-06 PROCEDURE — G8427 DOCREV CUR MEDS BY ELIG CLIN: HCPCS | Performed by: NURSE PRACTITIONER

## 2018-11-06 PROCEDURE — 1111F DSCHRG MED/CURRENT MED MERGE: CPT | Performed by: NURSE PRACTITIONER

## 2018-11-06 PROCEDURE — 2022F DILAT RTA XM EVC RTNOPTHY: CPT | Performed by: NURSE PRACTITIONER

## 2018-11-06 PROCEDURE — 1036F TOBACCO NON-USER: CPT | Performed by: NURSE PRACTITIONER

## 2018-11-06 PROCEDURE — 3017F COLORECTAL CA SCREEN DOC REV: CPT | Performed by: NURSE PRACTITIONER

## 2018-11-06 PROCEDURE — G8484 FLU IMMUNIZE NO ADMIN: HCPCS | Performed by: NURSE PRACTITIONER

## 2018-11-06 RX ORDER — FLASH GLUCOSE SENSOR
1 KIT MISCELLANEOUS CONTINUOUS
Qty: 1 DEVICE | Refills: 0 | Status: ON HOLD | OUTPATIENT
Start: 2018-11-06 | End: 2019-04-30 | Stop reason: SDUPTHER

## 2018-11-06 RX ORDER — GABAPENTIN 300 MG/1
300 CAPSULE ORAL 2 TIMES DAILY
Qty: 60 CAPSULE | Refills: 0 | Status: SHIPPED | OUTPATIENT
Start: 2018-11-06 | End: 2018-12-18 | Stop reason: ALTCHOICE

## 2018-11-06 ASSESSMENT — ENCOUNTER SYMPTOMS
COUGH: 0
BACK PAIN: 0
CONSTIPATION: 0
COLOR CHANGE: 0
WHEEZING: 0
EYE PAIN: 0
SHORTNESS OF BREATH: 0
STRIDOR: 0
SORE THROAT: 0
RHINORRHEA: 0
EYE DISCHARGE: 0
ABDOMINAL PAIN: 0
NAUSEA: 0
DIARRHEA: 0
VOMITING: 0

## 2018-11-06 NOTE — PROGRESS NOTES
Other (See Comments) and Nausea And Vomiting     Does not remember reactions    Clindamycin/Lincomycin Itching       Medications listed as ordered at the time of discharge from [address] Medication Instructions TRICE:    Printed on:11/06/18 3845   Medication Information                      acetaminophen (TYLENOL) 500 MG tablet  Take 1,000 mg by mouth every 6 hours as needed for Pain             bisacodyl (DULCOLAX) 5 MG EC tablet  Take 2 tablets by mouth daily Take day before colonoscopy in the afternoon             blood glucose monitor strips  Accucheck Sheila Test Strips Test blood sugars 4 times daily DX:E11.65             Blood Glucose Monitoring Suppl LIANNE  Use to test blood sugars DX:E11.65             calcium-vitamin D (OSCAL-500) 500-200 MG-UNIT per tablet  Take 1 tablet by mouth 2 times daily             cephALEXin (KEFLEX) 500 MG capsule  Take 1 capsule by mouth 3 times daily             Continuous Blood Gluc  (FREESTYLE MARIANELA READER) LIANNE  1 Units by Does not apply route continuous             insulin aspart (NOVOLOG) 100 UNIT/ML injection vial  Inject 20-25 Units into the skin 3 times daily (before meals) Plus scale-max 150 units per day             insulin glargine (BASAGLAR KWIKPEN) 100 UNIT/ML injection pen  Inject 60 Units into the skin 2 times daily             Insulin Pen Needle 32G X 4 MM MISC  1 each by Does not apply route daily             Insulin Syringe-Needle U-100 29G X 1/2\" 0.3 ML MISC  1 each by Does not apply route 4 times daily (after meals and at bedtime)             Lancets MISC  Use to test blood sugars 4 times daily DX:E11.65             linagliptin (TRADJENTA) 5 MG tablet  Take 1 tablet by mouth daily             mirtazapine (REMERON) 15 MG tablet  Take 1 tablet by mouth nightly             sertraline (ZOLOFT) 100 MG tablet  Take 1.5 tablets by mouth daily             sodium hypochlorite (DAKINS) 0.25 % SOLN  Use on the abscessed area on the right stump                   Medications marked \"taking\" at this time  Outpatient Prescriptions Marked as Taking for the 11/6/18 encounter (Office Visit) with MARILYN Ambrose - CNP   Medication Sig Dispense Refill    Continuous Blood Gluc  (FREESTYLE MARIANELA READER) LIANNE 1 Units by Does not apply route continuous 1 Device 0    linagliptin (TRADJENTA) 5 MG tablet Take 1 tablet by mouth daily 30 tablet 0    insulin aspart (NOVOLOG) 100 UNIT/ML injection vial Inject 20-25 Units into the skin 3 times daily (before meals) Plus scale-max 150 units per day 67.5 mL 3    bisacodyl (DULCOLAX) 5 MG EC tablet Take 2 tablets by mouth daily Take day before colonoscopy in the afternoon 2 tablet 0    blood glucose monitor strips Accucheck Sheila Test Strips Test blood sugars 4 times daily DX:E11.65 400 strip 5    insulin glargine (BASAGLAR KWIKPEN) 100 UNIT/ML injection pen Inject 60 Units into the skin 2 times daily 108 mL 3    cephALEXin (KEFLEX) 500 MG capsule Take 1 capsule by mouth 3 times daily 30 capsule 0    sodium hypochlorite (DAKINS) 0.25 % SOLN Use on the abscessed area on the right stump 1 Bottle 0    calcium-vitamin D (OSCAL-500) 500-200 MG-UNIT per tablet Take 1 tablet by mouth 2 times daily      acetaminophen (TYLENOL) 500 MG tablet Take 1,000 mg by mouth every 6 hours as needed for Pain      Lancets MISC Use to test blood sugars 4 times daily DX:E11.65 100 each 3    Insulin Syringe-Needle U-100 29G X 1/2\" 0.3 ML MISC 1 each by Does not apply route 4 times daily (after meals and at bedtime) 100 each 1    Insulin Pen Needle 32G X 4 MM MISC 1 each by Does not apply route daily 100 each 0    mirtazapine (REMERON) 15 MG tablet Take 1 tablet by mouth nightly 30 tablet 0    sertraline (ZOLOFT) 100 MG tablet Take 1.5 tablets by mouth daily 45 tablet 0    Blood Glucose Monitoring Suppl LIANNE Use to test blood sugars DX:E11.65 1 Device 0        Medications patient taking as of now reconciled against complains of gradually worsening numbness in both hands and his left foot getting worse over the last several months. He will be prescribed Neurontin for this. Patient has an upcoming appointment with Dr. Priyanka Jones and is planning to talk to him about a possible insulin pump.     Medical Decision Making: moderate complexity      Electronically signed by MARILYN Brown CNP on 11/6/2018 at 11:48 AM

## 2018-11-07 ENCOUNTER — HOSPITAL ENCOUNTER (OUTPATIENT)
Dept: WOUND CARE | Age: 48
Discharge: HOME | End: 2018-11-07
Payer: MEDICARE

## 2018-11-07 PROCEDURE — 17250 CHEM CAUT OF GRANLTJ TISSUE: CPT

## 2018-11-07 PROCEDURE — 6370000000 HC RX 637 (ALT 250 FOR IP): Performed by: INTERNAL MEDICINE

## 2018-11-07 RX ADMIN — SILVER NITRATE APPLICATORS 1 EACH: 25; 75 STICK TOPICAL at 11:27

## 2018-11-07 NOTE — PROGRESS NOTES
[facility]          Progress Note and Procedure Note      [facility] RECORD NUMBER:  [id number]  AGE: 46 y.o. GENDER: male  : 1970  EPISODE DATE:  2018    Subjective:     SUBJECTIVE     Chief Complaint   Patient presents with    Wound Check     right stump           HISTORY OF PRESENT ILLNESS    He was seen for follow-up visit he has right below knee amputation and had abscess on the stump requiring incision and drainage. He was seen for his follow-up visit. The wound is slowly getting better.   He has home health coming once a wk.     PAST MEDICAL HISTORY         Diagnosis Date    Bipolar 2 disorder [facility])     previously followed with Dr. Yarelis Krishnan and Forest Page in [facility] Diabetes mellitus type 2, uncontrolled ([address]     Diabetic foot ulcer ([address]     Diabetic polyneuropathy ([address]     Diabetic ulcer of right foot associated with type 2 diabetes mellitus ([address] 12/10/2015    Essential hypertension     \"never been on b/p medication that I know of\"    GERD (gastroesophageal reflux disease)     Hammer toe of left foot     Heart murmur     denies any chest pain or palpitations    History of tobacco abuse     Hx of BKA, right (HCC)     Macular edema, diabetic, bilateral ([address] 2018    Dr. Roverto Harper referred to retina specialist for 2nd opinion    Marijuana abuse in remission     Onychomycosis     WD-Skin ulcer of fourth toe of right foot with necrosis of bone ([address] 2016         PAST SURGICAL HISTORY     Past Surgical History:   Procedure Laterality Date    ABSCESS DRAINAGE Right     foot    FOOT DEBRIDEMENT Right 2016    I & D    LEG AMPUTATION BELOW KNEE Right 2016    OTHER SURGICAL HISTORY Right 1/14/15    sole of foot I&D    NC DRAIN INFECT SHOULDER BURSA Left 2017    LEFT SHOULDER INCISION AND DRAINAGE performed by Nelida Lang MD at [address] OFFICE/OUTPT [address] [facility] Right 2018 Dressing/Treatment Moist to dry 10/24/2018  8:41 AM   Wound Cleansed Rinsed/Irrigated with saline 11/7/2018 10:57 AM   Wound Length (cm) 1.5 cm 11/7/2018 10:57 AM   Wound Width (cm) 2.3 cm 11/7/2018 10:57 AM   Wound Depth (cm)  0.05 11/7/2018 10:57 AM   Calculated Wound Size (cm^2) (l*w) 3.45 cm^2 11/7/2018 10:57 AM   Change in Wound Size % (l*w) 76.69 11/7/2018 10:57 AM   Undermining Starts ___ O'Clock 10 11/7/2018 10:57 AM   Undermining Ends___ O'Clock 2 11/7/2018 10:57 AM   Undermining Maxium Distance (cm) 0.3 11/7/2018 10:57 AM   Wound Assessment Red;Black 11/7/2018 10:57 AM   Drainage Amount Moderate 11/7/2018 10:57 AM   Drainage Description Serosanguinous 11/7/2018 10:57 AM   Odor None 11/7/2018 10:57 AM   Margins Attached edges; Unattached edges 11/7/2018 10:57 AM   Louise-wound Assessment Maceration; White 11/7/2018 10:57 AM   Red%Wound Bed 90 11/7/2018 10:57 AM   Black%Wound Bed 10 11/7/2018 10:57 AM   Number of days: 44       LABS      Lab Results   Component Value Date    BC No growth-preliminary  No growth   09/18/2018       Assessment:    -non healing wound on the stump: continue local wound care   -Silver nitrate cautery was applied to the wound. Patient Active Problem List   Diagnosis Code    Status post below knee amputation of right lower extremity ([facility]) Z89.511    Acute blood loss anemia D62    Gait disturbance R26.9    Sebaceous cyst L72.3    Uncontrolled type 2 diabetes mellitus with hyperglycemia, with long-term current use of insulin ([facility]) E11.65, Z79.4    Severe bipolar II disorder, recent episode major depressive, remission ([facility]) F31.81    Bipolar affective disorder ([facility]) F31.9    Leukocytosis D72.829    Lactic acidosis E87.2    Normocytic anemia D64.9    Obesity (BMI 30-39. 9) E66.9    Infection of below knee amputation stump ([facility]) T87.40    History of noncompliance with medical treatment Z91.19    Essential hypertension I10    Sepsis ([facility] A41.9    Diabetic ulcer of lower

## 2018-11-28 ENCOUNTER — TELEPHONE (OUTPATIENT)
Dept: INTERNAL MEDICINE CLINIC | Age: 48
End: 2018-11-28

## 2018-11-28 NOTE — TELEPHONE ENCOUNTER
Just wanted to make you aware that Pr[facility] Harris is not scheduling [facility]. Last seen 10/23/18. Thank you.

## 2018-12-06 ENCOUNTER — HOSPITAL ENCOUNTER (EMERGENCY)
Age: 48
Discharge: HOME | End: 2018-12-06
Attending: FAMILY MEDICINE
Payer: MEDICARE

## 2018-12-06 ENCOUNTER — HOSPITAL ENCOUNTER (OUTPATIENT)
Dept: WOUND CARE | Age: 48
Discharge: HOME | End: 2018-12-06
Payer: MEDICARE

## 2018-12-06 ENCOUNTER — APPOINTMENT (OUTPATIENT)
Dept: GENERAL RADIOLOGY | Age: 48
End: 2018-12-06
Payer: MEDICARE

## 2018-12-06 VITALS
DIASTOLIC BLOOD PRESSURE: 67 MMHG | BODY MASS INDEX: 36.96 KG/M2 | HEIGHT: 66 IN | TEMPERATURE: 97.8 F | WEIGHT: 230 LBS | SYSTOLIC BLOOD PRESSURE: 121 MMHG | RESPIRATION RATE: 16 BRPM | OXYGEN SATURATION: 97 % | HEART RATE: 98 BPM

## 2018-12-06 VITALS
OXYGEN SATURATION: 98 % | RESPIRATION RATE: 18 BRPM | WEIGHT: 213.2 LBS | DIASTOLIC BLOOD PRESSURE: 87 MMHG | BODY MASS INDEX: 33.39 KG/M2 | SYSTOLIC BLOOD PRESSURE: 132 MMHG | HEART RATE: 113 BPM | TEMPERATURE: 98.2 F

## 2018-12-06 DIAGNOSIS — E11.621 DIABETIC ULCER OF LEFT FIFTH TOE (HCC): ICD-10-CM

## 2018-12-06 DIAGNOSIS — L97.529 DIABETIC ULCER OF LEFT FIFTH TOE (HCC): ICD-10-CM

## 2018-12-06 DIAGNOSIS — Z51.89 VISIT FOR WOUND CHECK: ICD-10-CM

## 2018-12-06 DIAGNOSIS — E11.621 DIABETIC ULCER OF LEFT FIFTH TOE (HCC): Primary | ICD-10-CM

## 2018-12-06 DIAGNOSIS — L03.032 CELLULITIS OF FIFTH TOE, LEFT: ICD-10-CM

## 2018-12-06 DIAGNOSIS — L97.529 DIABETIC ULCER OF LEFT FIFTH TOE (HCC): Primary | ICD-10-CM

## 2018-12-06 LAB
ANION GAP SERPL CALCULATED.3IONS-SCNC: 18 MEQ/L (ref 8–16)
BASOPHILS # BLD: 0.7 %
BASOPHILS ABSOLUTE: 0.1 THOU/MM3 (ref 0–0.1)
BUN BLDV-MCNC: 12 MG/DL (ref 7–22)
CALCIUM SERPL-MCNC: 9.9 MG/DL (ref 8.5–10.5)
CHLORIDE BLD-SCNC: 91 MEQ/L (ref 98–111)
CO2: 22 MEQ/L (ref 23–33)
CREAT SERPL-MCNC: 0.9 MG/DL (ref 0.4–1.2)
EOSINOPHIL # BLD: 3.4 %
EOSINOPHILS ABSOLUTE: 0.4 THOU/MM3 (ref 0–0.4)
ERYTHROCYTE [DISTWIDTH] IN BLOOD BY AUTOMATED COUNT: 14.2 % (ref 11.5–14.5)
ERYTHROCYTE [DISTWIDTH] IN BLOOD BY AUTOMATED COUNT: 43.4 FL (ref 35–45)
GFR SERPL CREATININE-BSD FRML MDRD: 89 ML/MIN/1.73M2
GLUCOSE BLD-MCNC: 395 MG/DL (ref 70–108)
HCT VFR BLD CALC: 43.3 % (ref 42–52)
HEMOGLOBIN: 15.3 GM/DL (ref 14–18)
IMMATURE GRANS (ABS): 0.06 THOU/MM3 (ref 0–0.07)
IMMATURE GRANULOCYTES: 0.6 %
LYMPHOCYTES # BLD: 24.5 %
LYMPHOCYTES ABSOLUTE: 2.6 THOU/MM3 (ref 1–4.8)
MCH RBC QN AUTO: 30.2 PG (ref 26–33)
MCHC RBC AUTO-ENTMCNC: 35.3 GM/DL (ref 32.2–35.5)
MCV RBC AUTO: 85.6 FL (ref 80–94)
MONOCYTES # BLD: 7 %
MONOCYTES ABSOLUTE: 0.7 THOU/MM3 (ref 0.4–1.3)
NUCLEATED RED BLOOD CELLS: 0 /100 WBC
OSMOLALITY CALCULATION: 278.9 MOSMOL/KG (ref 275–300)
PLATELET # BLD: 246 THOU/MM3 (ref 130–400)
PMV BLD AUTO: 9.1 FL (ref 9.4–12.4)
POTASSIUM SERPL-SCNC: 4.4 MEQ/L (ref 3.5–5.2)
RBC # BLD: 5.06 MILL/MM3 (ref 4.7–6.1)
SEG NEUTROPHILS: 63.8 %
SEGMENTED NEUTROPHILS ABSOLUTE COUNT: 6.7 THOU/MM3 (ref 1.8–7.7)
SODIUM BLD-SCNC: 131 MEQ/L (ref 135–145)
WBC # BLD: 10.5 THOU/MM3 (ref 4.8–10.8)

## 2018-12-06 PROCEDURE — 73620 X-RAY EXAM OF FOOT: CPT

## 2018-12-06 PROCEDURE — 99212 OFFICE O/P EST SF 10 MIN: CPT

## 2018-12-06 PROCEDURE — 2709999900 HC NON-CHARGEABLE SUPPLY

## 2018-12-06 PROCEDURE — 99213 OFFICE O/P EST LOW 20 MIN: CPT | Performed by: NURSE PRACTITIONER

## 2018-12-06 PROCEDURE — 80048 BASIC METABOLIC PNL TOTAL CA: CPT

## 2018-12-06 PROCEDURE — 11720 DEBRIDE NAIL 1-5: CPT | Performed by: NURSE PRACTITIONER

## 2018-12-06 PROCEDURE — 85025 COMPLETE CBC W/AUTO DIFF WBC: CPT

## 2018-12-06 PROCEDURE — 36415 COLL VENOUS BLD VENIPUNCTURE: CPT

## 2018-12-06 PROCEDURE — 99283 EMERGENCY DEPT VISIT LOW MDM: CPT

## 2018-12-06 RX ORDER — SULFAMETHOXAZOLE AND TRIMETHOPRIM 800; 160 MG/1; MG/1
1 TABLET ORAL 2 TIMES DAILY
Qty: 20 TABLET | Refills: 0 | Status: SHIPPED | OUTPATIENT
Start: 2018-12-06 | End: 2018-12-12 | Stop reason: SDUPTHER

## 2018-12-06 ASSESSMENT — ENCOUNTER SYMPTOMS
RHINORRHEA: 0
NAUSEA: 0
DIARRHEA: 0
SHORTNESS OF BREATH: 0
COUGH: 0
BACK PAIN: 0
CONSTIPATION: 0
WHEEZING: 0
SORE THROAT: 0
ABDOMINAL PAIN: 0
BLOOD IN STOOL: 0
COLOR CHANGE: 1
VOMITING: 0

## 2018-12-06 NOTE — ED PROVIDER NOTES
sodium hypochlorite (DAKINS) 0.25 % SOLN Use on the abscessed area on the right stump, Disp-1 Bottle, R-0Normal      calcium-vitamin D (OSCAL-500) 500-200 MG-UNIT per tablet Take 1 tablet by mouth 2 times dailyOTC      acetaminophen (TYLENOL) 500 MG tablet Take 1,000 mg by mouth every 6 hours as needed for PainHistorical Med      Lancets MISC Disp-100 each, R-3, PrintUse to test blood sugars 4 times daily DX:E11.65      Insulin Syringe-Needle U-100 29G X 1/2\" 0.3 ML MISC 4 TIMES DAILY AFTER MEALS AND BEFORE BEDTIME Starting Thu 2018, Disp-100 each, R-1, Print      Insulin Pen Needle 32G X 4 MM MISC DAILY Starting Thu 2018, Disp-100 each, R-0, Print      mirtazapine (REMERON) 15 MG tablet Take 1 tablet by mouth nightly, Disp-30 tablet, R-0Normal      sertraline (ZOLOFT) 100 MG tablet Take 1.5 tablets by mouth daily, Disp-45 tablet, R-0Normal      Blood Glucose Monitoring Suppl LIANNE Disp-1 Device, R-0, NormalUse to test blood sugars DX:E11.65             ALLERGIES     is allergic to pcn [penicillins] and clindamycin/lincomycin. FAMILY HISTORY     indicated that his mother is . He reported the following about his father: unknown. He indicated that the status of his maternal grandmother is unknown. He indicated that the status of his maternal grandfather is unknown.    family history includes Arthritis in his maternal grandfather; Depression in his mother; Diabetes in his mother; Early Death in his mother; Heart Disease in his maternal grandfather; High Blood Pressure in his mother; High Cholesterol in his mother; Other in his mother; Vision Loss in his maternal grandmother. SOCIAL HISTORY      reports that he quit smoking about 3 months ago. His smoking use included Cigars. He started smoking about 9 months ago. He has a 65.00 pack-year smoking history. He has never used smokeless tobacco. He reports that he does not drink alcohol or use drugs.     PHYSICAL EXAM     INITIAL VITALS:  height is 5' MEDICATIONS:  Discharge Medication List as of 12/6/2018  3:41 PM      START taking these medications    Details   sulfamethoxazole-trimethoprim (BACTRIM DS) 800-160 MG per tablet Take 1 tablet by mouth 2 times daily for 10 days, Disp-20 tablet, R-0Print             (Please note that portions of this note were completed with a voice recognition program.  Efforts were made to edit the dictations but occasionally words are mis-transcribed.)    Scribe:  Leta Eason 12/6/18 12:53 PM Scribing for and in the presence of Domenic Zamudio MD.    Signed by: Denia Delgado, 12/06/18 6:44 PM    Provider:  I personally performed the services described in the documentation, reviewed and edited the documentation which was dictated to the scribe in my presence, and it accurately records my words and actions.     Domenic Zamudio MD 12/6/18 6:44 PM       Domenic Zamudio MD  12/06/18 [id number]

## 2018-12-06 NOTE — CONSULTS
Podiatric Surgery Consult    CC:  Left fifth digit wound     HPI:  The patient is a 46 y.o. male with significant past medical history of uncontrolled DM and right BKA who was advised to come to the ED from his wound care appointment. Patient is being seen by Nhung Yu for his right stump wound. Patient states he noticed a wound on the lateral aspect of his left 5th digit two days ago. Patient states the wound has gotten worse but is causing him no pain. Patient denies any purulence or malodor. Patient denies any F/C/N/V/SOB/CP. Patient states his last A1c was 9.8 and his blood glucose is usually above 300. Patient states he has not done anything for the wound as far as topical treatment.      Past Medical History:        Diagnosis Date    Bipolar 2 disorder ([address]     previously followed with Dr. Bhupinder Reed and Levi Silver in [facility] Diabetes mellitus type 2, uncontrolled ([address]     Diabetic foot ulcer ([address]     Diabetic polyneuropathy ([address]     Diabetic ulcer of right foot associated with type 2 diabetes mellitus ([address] 12/10/2015    Essential hypertension     \"never been on b/p medication that I know of\"    GERD (gastroesophageal reflux disease)     Hammer toe of left foot     Heart murmur     denies any chest pain or palpitations    History of tobacco abuse     Hx of BKA, right (HCC)     Macular edema, diabetic, bilateral ([address] 05/04/2018    Dr. Rosalina Seymour referred to retina specialist for 2nd opinion    Marijuana abuse in remission     Onychomycosis     WD-Skin ulcer of fourth toe of right foot with necrosis of bone ([address] 6/29/2016       Past Surgical History:        Procedure Laterality Date    ABSCESS DRAINAGE Right     foot    FOOT DEBRIDEMENT Right 07/01/2016    I & D    LEG AMPUTATION BELOW KNEE Right 07/20/2016    OTHER SURGICAL HISTORY Right 1/14/15    sole of foot I&D    HI DRAIN INFECT SHOULDER BURSA Left 8/18/2017    LEFT SHOULDER INCISION AND DRAINAGE

## 2018-12-06 NOTE — PROGRESS NOTES
[address] and Procedure Note      [address] [facility] RECORD NUMBER:  [id number]  AGE: 46 y.o. GENDER: male  : 1970  EPISODE DATE:  2018    Subjective:     Chief Complaint   Patient presents with    Wound Check     left foot         HISTORY of PRESENT ILLNESS HPI     Christina Mccrary is a 46 y.o. male New patient referred by Dr. John Lee, who presents today for wound/ulcer evaluation. History of Wound Context: Patient has an ulcer to his left fifth toe which he noticed approximately 2 days ago. He denies any drainage or fevers however he has redness, swelling, and warmth he has an ulcer to the right stump which is improving from a previous abscess and has been following with Dr. John Lee for it. He is a previous amputation of the right lower leg due to a diabetic ulcer in the past.  He states that his glucose levels have been elevated higher than usual for the last week. Hemoglobin A1c from 10/22/2018 was 9.8.    Wound/Ulcer Pain Timing/Severity: none  Quality of pain: N/A  Severity:  0 / 10   Modifying Factors: None  Associated Signs/Symptoms: edema and erythema        PAST MEDICAL HISTORY        Diagnosis Date    Bipolar 2 disorder (HCC)     previously followed with Dr. Marcelo Mcdonnell and Radha Fuentes in [facility] Diabetes mellitus type 2, uncontrolled ([address]     Diabetic foot ulcer ([address]     Diabetic polyneuropathy ([address]     Diabetic ulcer of right foot associated with type 2 diabetes mellitus ([address] 12/10/2015    Essential hypertension     \"never been on b/p medication that I know of\"    GERD (gastroesophageal reflux disease)     Hammer toe of left foot     Heart murmur     denies any chest pain or palpitations    History of tobacco abuse     Hx of BKA, right (HCC)     Macular edema, diabetic, bilateral ([address] 2018    Dr. Vlad Cardona referred to retina specialist for 2nd opinion    Marijuana abuse in remission     Onychomycosis nausea and vomiting  Integument/breast: positive for right stump ulcer and left 5th toe ulcer  Musculoskeletal:negative for muscle weakness  Neurological: negative for gait problems, memory problems and speech problems  Behavioral/Psych: positive for good mood  Endocrine: positive for diabetic symptoms including foot ulcerations and neuropathy    Objective:      /87   Pulse 113   Temp 98.2 °F (36.8 °C) (Oral)   Resp 18   Wt 213 lb 3.2 oz (96.7 kg)   SpO2 98%   BMI 33.39 kg/m²     Wt Readings from Last 3 Encounters:   12/06/18 230 lb (104.3 kg)   12/06/18 213 lb 3.2 oz (96.7 kg)   11/06/18 230 lb 3.2 oz (104.4 kg)       PHYSICAL EXAM    General Appearance: alert and oriented to person, place and time  Skin: warm and dry  Head: normocephalic and atraumatic  Eyes: pupils equal, round, and reactive to light  ENT: hearing grossly normal bilaterally  Pulmonary/Chest: clear to auscultation bilaterally- no wheezes, rales or rhonchi, normal air movement, no respiratory distress  Cardiovascular: normal rate, regular rhythm and intact distal pulses  Abdomen: soft, non-tender and bowel sounds normal  Extremities: no cyanosis, no clubbing and left lower leg and foot is hairless with rin discoloration; long, thick, painful, dystrophic nails ×5 to the left foot  Musculoskeletal: normal range of motion of extremities x 3, no joint swelling, deformity or tenderness and right below the knee amputation-wears prosthesis  Neurologic: gait and coordination normal and speech normal  Right stump: Patient refused assessment today, ulcer is managed by Dr. Sandy Green  Left 5th toe: Ulcer is 90% black eschar and 10% yellow slough. Scant drainage noted. Periulcer skin is purple/red and warm. Toe has a sausage like appearance.       Left 5th toe    Wound 09/23/18 Other (Comment) Knee Right;Lateral;Anterior red open area (Active)   Wound Image   10/24/2018  8:41 AM   Wound Type Wound 11/7/2018 10:57 AM   Dressing Status Changed 11/7/2018 09/21/2018    CL 90 10/22/2018    CO2 23 10/22/2018    PHOS 2.6 04/04/2018    BUN 15 10/22/2018    CREATININE 0.8 10/22/2018     PT/INR:   Lab Results   Component Value Date    PROTIME 12.2 07/19/2016    INR 1.07 07/19/2016     Prealbumin: No results found for: PREALBUMIN  Albumin:  Lab Results   Component Value Date    LABALBU 4.0 10/17/2018     Sed Rate:  Lab Results   Component Value Date    SEDRATE 104 09/18/2018     CRP:   Lab Results   Component Value Date    CRP 0.81 08/21/2017     Micro:   Lab Results   Component Value Date    BC No growth-preliminary  No growth   09/18/2018      Hemoglobin A1C:   Lab Results   Component Value Date    LABA1C 9.8 10/22/2018       Assessment:     Cellulitis of fifth toe, left  Diabetic ulcer of left fifth toe    Contributing Factors: diabetes, poor glucose control and arterial insufficiency    Patient Active Problem List   Diagnosis Code    Status post below knee amputation of right lower extremity ([facility]) Z89.511    Acute blood loss anemia D62    Gait disturbance R26.9    Sebaceous cyst L72.3    Uncontrolled type 2 diabetes mellitus with hyperglycemia, with long-term current use of insulin ([facility]) E11.65, Z79.4    Severe bipolar II disorder, recent episode major depressive, remission ([facility]) F31.81    Bipolar affective disorder ([facility]) F31.9    Leukocytosis D72.829    Lactic acidosis E87.2    Normocytic anemia D64.9    Obesity (BMI 30-39. 9) E66.9    Infection of below knee amputation stump ([facility]) T87.40    History of noncompliance with medical treatment Z91.19    Essential hypertension I10    Sepsis ([facility] A41.9    Diabetic ulcer of lower leg ([facility] E11.622, L97.909    History of right below knee amputation ([facility]) Z89.511    Tobacco dependence F17.200    History of osteomyelitis Z87.39    Gastroesophageal reflux disease without esophagitis K21.9    History of marijuana use Z87.898    Abscess of right leg L02.415    Physical debility R53.81    Class 2 severe

## 2018-12-06 NOTE — ED TRIAGE NOTES
Pt sent by wound clinic NP for evaluation of 5th toe on left foot wound. Pt is diabetic, denies sensation in feet which is normal for him due to neuropathy. Wound edges are pink, with wound yellow and bed is eschar. Pt states wounds has been there for a couple days per pt report, but states the NP stated it had to be there \"for awhile. \"

## 2018-12-07 ENCOUNTER — HOSPITAL ENCOUNTER (OUTPATIENT)
Dept: WOUND CARE | Age: 48
Discharge: HOME | End: 2018-12-07
Payer: MEDICARE

## 2018-12-07 VITALS
TEMPERATURE: 97.9 F | WEIGHT: 230 LBS | HEIGHT: 66 IN | HEART RATE: 116 BPM | DIASTOLIC BLOOD PRESSURE: 75 MMHG | OXYGEN SATURATION: 99 % | BODY MASS INDEX: 36.96 KG/M2 | RESPIRATION RATE: 18 BRPM | SYSTOLIC BLOOD PRESSURE: 162 MMHG

## 2018-12-07 PROCEDURE — 99213 OFFICE O/P EST LOW 20 MIN: CPT

## 2018-12-07 ASSESSMENT — PAIN SCALES - GENERAL: PAINLEVEL_OUTOF10: 0

## 2018-12-07 NOTE — PLAN OF CARE
Problem: Wound:  Goal: Will show signs of wound healing; wound closure and no evidence of infection  Will show signs of wound healing; wound closure and no evidence of infection   Outcome: Ongoing  Patient presents to wound clinic for evaluation and treatment of new left fifth toe wound per Dr. Eliezer Rhoades. Dr. Eliezer Rhoades discussed surgical and wound care options with patient. Dr. Lisa Brewster office will contact patient regarding additional information, surgery date/time/location. Patient to make appt with family dr to get surgical clearance. Left fifth toe wound dressed with betadine, gauze, paradise- patient to change dressing daily. Follow up to be scheduled after surgery. Comments: Care plan reviewed with patient. Patient verbalize understanding of the plan of care and contribute to goal setting.

## 2018-12-08 NOTE — H&P
with necrosis of bone ([facility] [address] 6/29/2016       PAST SURGICAL HISTORY    Past Surgical History:   Procedure Laterality Date    ABSCESS DRAINAGE Right     foot    FOOT DEBRIDEMENT Right 07/01/2016    I & D    LEG AMPUTATION BELOW KNEE Right 07/20/2016    OTHER SURGICAL HISTORY Right 1/14/15    sole of foot I&D    NH DRAIN INFECT SHOULDER BURSA Left 8/18/2017    LEFT SHOULDER INCISION AND DRAINAGE performed by Lyle Birch MD at [facility] OFFICE/OUTPT [facility] Right 9/20/2018    EXCISIONAL DEBRIDEMENT RIGHT BKA STUMP performed by Aida Salcedo MD at [address] Right 1/16/15    2nd toe with wound vac applied    WISDOM TOOTH EXTRACTION  ? when       FAMILY HISTORY    Family History   Problem Relation Age of Onset    Diabetes Mother     Other Mother         pneumonia, H1N1    Depression Mother     Early Death Mother     High Blood Pressure Mother     High Cholesterol Mother     Vision Loss Maternal Grandmother     Arthritis Maternal Grandfather     Heart Disease Maternal Grandfather        SOCIAL HISTORY    Social History   Substance Use Topics    Smoking status: Former Smoker     Packs/day: 5.00     Years: 13.00     Types: Cigars     Start date: 3/1/2018     Quit date: 9/1/2018    Smokeless tobacco: Never Used    Alcohol use No      Comment: last drink \"several years ago\"       ALLERGIES    Allergies   Allergen Reactions    Pcn [Penicillins] Shortness Of Breath, Other (See Comments) and Nausea And Vomiting     Does not remember reactions    Clindamycin/Lincomycin Itching       MEDICATIONS    Current Outpatient Prescriptions on File Prior to Encounter   Medication Sig Dispense Refill    metoprolol tartrate (LOPRESSOR) 25 MG tablet Take 25 mg by mouth daily      Continuous Blood Gluc  (FREESTYLE MARIANELA READER) LIANNE 1 Units by Does not apply route continuous 1 Device 0    linagliptin (TRADJENTA) 5 MG tablet Take 1 tablet by mouth daily 30 tablet 0   

## 2018-12-08 NOTE — CONSULTS
kg)   12/06/18 230 lb (104.3 kg)   12/06/18 213 lb 3.2 oz (96.7 kg)       PHYSICAL EXAMINATION  CONSTITUTIONAL:  awake, alert, cooperative, no apparent distress, and appears stated age  EYES:  pupils equal, round and reactive to light, extra ocular muscles intact, sclera clear, conjunctiva normal  ENT:  normocepalic, without obvious abnormality  NECK:  Supple, symmetrical, trachea midline, no adenopathy, thyroid symmetric, not enlarged and no tenderness, skin normal  LUNGS:  No increased work of breathing, good air exchange  CARDIOVASCULAR:  regular rate and rhythm  Extremities (lower extremity examination in detail)  Vascular: Dorsalis pedis and posterior tibial pulses are palpable left lower extremity. Skin temperature is warm to warm, proximal tibial to distal digits. CFT sluggish, but intact to toes 1-5. Dermatologic: refer to wound documentation. Stable full thickness eschar overlying the proximal interphalangeal joint of the left 5th digit. No further signs of infection present. Neurovascular: epicritic and protopathic sensations are grossly diminished. Musculoskeletal: Right below knee amputation. Mild pain with palpation/manipulation of left 5th digit.      Wound 09/23/18 Other (Comment) Knee Right;Lateral;Anterior red open area (Active)   Wound Image   10/24/2018  8:41 AM   Wound Type Wound 11/7/2018 10:57 AM   Dressing Status Changed 11/7/2018 10:57 AM   Dressing Changed Changed/New 11/7/2018 10:57 AM   Dressing/Treatment Moist to dry 10/24/2018  8:41 AM   Wound Cleansed Rinsed/Irrigated with saline 11/7/2018 10:57 AM   Wound Length (cm) 1.5 cm 11/7/2018 10:57 AM   Wound Width (cm) 2.3 cm 11/7/2018 10:57 AM   Wound Depth (cm)  0.05 11/7/2018 10:57 AM   Calculated Wound Size (cm^2) (l*w) 3.45 cm^2 11/7/2018 10:57 AM   Change in Wound Size % (l*w) 76.69 11/7/2018 10:57 AM   Undermining Starts ___ O'Clock 10 11/7/2018 10:57 AM   Undermining Ends___ O'Clock 2 11/7/2018 10:57 AM   Undermining Maxium DPM on 12/8/2018 at 1:30 PM

## 2018-12-12 ENCOUNTER — OFFICE VISIT (OUTPATIENT)
Dept: FAMILY MEDICINE CLINIC | Age: 48
End: 2018-12-12
Payer: MEDICARE

## 2018-12-12 VITALS
TEMPERATURE: 97.8 F | WEIGHT: 229 LBS | HEART RATE: 78 BPM | BODY MASS INDEX: 36.96 KG/M2 | DIASTOLIC BLOOD PRESSURE: 65 MMHG | RESPIRATION RATE: 20 BRPM | SYSTOLIC BLOOD PRESSURE: 125 MMHG

## 2018-12-12 DIAGNOSIS — L97.529 DIABETIC ULCER OF LEFT FIFTH TOE (HCC): ICD-10-CM

## 2018-12-12 DIAGNOSIS — Z01.818 PRE-OP EXAMINATION: Primary | ICD-10-CM

## 2018-12-12 DIAGNOSIS — E11.65 UNCONTROLLED TYPE 2 DIABETES MELLITUS WITH HYPERGLYCEMIA, WITH LONG-TERM CURRENT USE OF INSULIN (HCC): ICD-10-CM

## 2018-12-12 DIAGNOSIS — E11.621 DIABETIC ULCER OF LEFT FIFTH TOE (HCC): ICD-10-CM

## 2018-12-12 DIAGNOSIS — Z79.4 UNCONTROLLED TYPE 2 DIABETES MELLITUS WITH HYPERGLYCEMIA, WITH LONG-TERM CURRENT USE OF INSULIN (HCC): ICD-10-CM

## 2018-12-12 PROBLEM — E66.812 CLASS 2 SEVERE OBESITY DUE TO EXCESS CALORIES WITH SERIOUS COMORBIDITY AND BODY MASS INDEX (BMI) OF 38.0 TO 38.9 IN ADULT: Status: RESOLVED | Noted: 2018-09-21 | Resolved: 2018-12-12

## 2018-12-12 PROBLEM — A41.9 SEPSIS (HCC): Status: RESOLVED | Noted: 2018-09-18 | Resolved: 2018-12-12

## 2018-12-12 PROBLEM — E66.01 CLASS 2 SEVERE OBESITY DUE TO EXCESS CALORIES WITH SERIOUS COMORBIDITY AND BODY MASS INDEX (BMI) OF 38.0 TO 38.9 IN ADULT (HCC): Status: RESOLVED | Noted: 2018-09-21 | Resolved: 2018-12-12

## 2018-12-12 PROCEDURE — G8427 DOCREV CUR MEDS BY ELIG CLIN: HCPCS | Performed by: NURSE PRACTITIONER

## 2018-12-12 PROCEDURE — 1036F TOBACCO NON-USER: CPT | Performed by: NURSE PRACTITIONER

## 2018-12-12 PROCEDURE — 2022F DILAT RTA XM EVC RTNOPTHY: CPT | Performed by: NURSE PRACTITIONER

## 2018-12-12 PROCEDURE — 3017F COLORECTAL CA SCREEN DOC REV: CPT | Performed by: NURSE PRACTITIONER

## 2018-12-12 PROCEDURE — G8484 FLU IMMUNIZE NO ADMIN: HCPCS | Performed by: NURSE PRACTITIONER

## 2018-12-12 PROCEDURE — G8417 CALC BMI ABV UP PARAM F/U: HCPCS | Performed by: NURSE PRACTITIONER

## 2018-12-12 PROCEDURE — 99213 OFFICE O/P EST LOW 20 MIN: CPT | Performed by: NURSE PRACTITIONER

## 2018-12-12 PROCEDURE — 3046F HEMOGLOBIN A1C LEVEL >9.0%: CPT | Performed by: NURSE PRACTITIONER

## 2018-12-12 RX ORDER — HYDROCODONE BITARTRATE AND ACETAMINOPHEN 5; 325 MG/1; MG/1
1 TABLET ORAL EVERY 6 HOURS PRN
Qty: 12 TABLET | Refills: 0 | Status: SHIPPED | OUTPATIENT
Start: 2018-12-12 | End: 2018-12-15

## 2018-12-12 RX ORDER — SULFAMETHOXAZOLE AND TRIMETHOPRIM 800; 160 MG/1; MG/1
1 TABLET ORAL 2 TIMES DAILY
Qty: 20 TABLET | Refills: 0 | Status: SHIPPED | OUTPATIENT
Start: 2018-12-12 | End: 2018-12-18 | Stop reason: ALTCHOICE

## 2018-12-12 ASSESSMENT — ENCOUNTER SYMPTOMS
NAUSEA: 0
WHEEZING: 0
BACK PAIN: 0
STRIDOR: 0
EYE DISCHARGE: 0
CONSTIPATION: 0
COLOR CHANGE: 0
SHORTNESS OF BREATH: 0
EYE PAIN: 0
RHINORRHEA: 0
DIARRHEA: 0
COUGH: 0
ABDOMINAL PAIN: 0
VOMITING: 0
SORE THROAT: 0

## 2018-12-12 NOTE — PROGRESS NOTES
[address]   [address] [facility]  [address]  Dept: [phone]  Dept Fax: [phone]    SUBJECTIVE         Pt presents for PRE-OP physical exam and review of his diabetic status. Surgery is scheduled for removal of left little toe with Dr. Linda Morgan at [facility]; no date set yet. General anesthesia is planned. Current symptoms include chronic infection secondary to long-term diabetes type 1 while on insulin. CURRENT EXERCISE REGIMEN:none    PREVIOUS ANESTHESIA PROBLEMS? none    Current medical problems include   Patient Active Problem List   Diagnosis    Status post below knee amputation of right lower extremity (HCC)    Gait disturbance    Sebaceous cyst    Uncontrolled type 2 diabetes mellitus with hyperglycemia, with long-term current use of insulin (HCC)    Severe bipolar II disorder, recent episode major depressive, remission ([address]    Bipolar affective disorder (HCC)    Normocytic anemia    Obesity (BMI 30-39. 9)    Infection of below knee amputation stump (HCC)    History of noncompliance with medical treatment    Essential hypertension    Diabetic ulcer of lower leg ([address]    History of right below knee amputation ([address]    Tobacco dependence    History of osteomyelitis    Gastroesophageal reflux disease without esophagitis    History of marijuana use    Abscess of right leg    Physical debility    Diabetic ulcer of left fifth toe (HCC)    Cellulitis of fifth toe, left       Past Medical History:   Diagnosis Date    Bipolar 2 disorder ([address]     previously followed with Dr. Rock Matthews and Hemanth Stinson in [facility] Diabetes mellitus type 2, uncontrolled ([address]     Diabetic foot ulcer ([address]     Diabetic polyneuropathy ([address]     Diabetic ulcer of right foot associated with type 2 diabetes mellitus ([address] 12/10/2015    Essential hypertension     \"never been on b/p medication that I know of\"    GERD (gastroesophageal reflux disease)     Hammer toe of left

## 2018-12-18 ENCOUNTER — HOSPITAL ENCOUNTER (EMERGENCY)
Age: 48
Discharge: HOME | End: 2018-12-18
Payer: MEDICARE

## 2018-12-18 ENCOUNTER — APPOINTMENT (OUTPATIENT)
Dept: GENERAL RADIOLOGY | Age: 48
End: 2018-12-18
Payer: MEDICARE

## 2018-12-18 VITALS
HEIGHT: 66 IN | OXYGEN SATURATION: 98 % | BODY MASS INDEX: 36.8 KG/M2 | RESPIRATION RATE: 14 BRPM | HEART RATE: 89 BPM | WEIGHT: 229 LBS | TEMPERATURE: 98 F | DIASTOLIC BLOOD PRESSURE: 95 MMHG | SYSTOLIC BLOOD PRESSURE: 167 MMHG

## 2018-12-18 DIAGNOSIS — E11.621 TYPE 2 DIABETES MELLITUS WITH LEFT DIABETIC FOOT ULCER (HCC): Primary | ICD-10-CM

## 2018-12-18 DIAGNOSIS — L97.529 TYPE 2 DIABETES MELLITUS WITH LEFT DIABETIC FOOT ULCER (HCC): Primary | ICD-10-CM

## 2018-12-18 LAB
ANION GAP SERPL CALCULATED.3IONS-SCNC: 17 MEQ/L (ref 8–16)
BASOPHILS # BLD: 0.6 %
BASOPHILS ABSOLUTE: 0 THOU/MM3 (ref 0–0.1)
BUN BLDV-MCNC: 16 MG/DL (ref 7–22)
CALCIUM SERPL-MCNC: 9.1 MG/DL (ref 8.5–10.5)
CHLORIDE BLD-SCNC: 93 MEQ/L (ref 98–111)
CHP ED QC CHECK: YES
CO2: 22 MEQ/L (ref 23–33)
CREAT SERPL-MCNC: 0.8 MG/DL (ref 0.4–1.2)
EOSINOPHIL # BLD: 5.2 %
EOSINOPHILS ABSOLUTE: 0.4 THOU/MM3 (ref 0–0.4)
ERYTHROCYTE [DISTWIDTH] IN BLOOD BY AUTOMATED COUNT: 14 % (ref 11.5–14.5)
ERYTHROCYTE [DISTWIDTH] IN BLOOD BY AUTOMATED COUNT: 44.9 FL (ref 35–45)
GFR SERPL CREATININE-BSD FRML MDRD: > 90 ML/MIN/1.73M2
GLUCOSE BLD-MCNC: 291 MG/DL (ref 70–108)
GLUCOSE BLD-MCNC: 403 MG/DL
GLUCOSE BLD-MCNC: 403 MG/DL (ref 70–108)
GLUCOSE BLD-MCNC: 497 MG/DL (ref 70–108)
HCT VFR BLD CALC: 42.4 % (ref 42–52)
HEMOGLOBIN: 14.6 GM/DL (ref 14–18)
IMMATURE GRANS (ABS): 0.04 THOU/MM3 (ref 0–0.07)
IMMATURE GRANULOCYTES: 0.5 %
LACTIC ACID, SEPSIS: 2.2 MMOL/L (ref 0.5–1.9)
LACTIC ACID, SEPSIS: 4.7 MMOL/L (ref 0.5–1.9)
LYMPHOCYTES # BLD: 29.7 %
LYMPHOCYTES ABSOLUTE: 2.3 THOU/MM3 (ref 1–4.8)
MCH RBC QN AUTO: 30.3 PG (ref 26–33)
MCHC RBC AUTO-ENTMCNC: 34.4 GM/DL (ref 32.2–35.5)
MCV RBC AUTO: 88 FL (ref 80–94)
MONOCYTES # BLD: 8.1 %
MONOCYTES ABSOLUTE: 0.6 THOU/MM3 (ref 0.4–1.3)
NUCLEATED RED BLOOD CELLS: 0 /100 WBC
OSMOLALITY CALCULATION: 287.8 MOSMOL/KG (ref 275–300)
PLATELET # BLD: 203 THOU/MM3 (ref 130–400)
PMV BLD AUTO: 9.6 FL (ref 9.4–12.4)
POTASSIUM SERPL-SCNC: 4.6 MEQ/L (ref 3.5–5.2)
PROCALCITONIN: 0.07 NG/ML (ref 0.01–0.09)
RBC # BLD: 4.82 MILL/MM3 (ref 4.7–6.1)
SEDIMENTATION RATE, ERYTHROCYTE: 22 MM/HR (ref 0–10)
SEG NEUTROPHILS: 55.9 %
SEGMENTED NEUTROPHILS ABSOLUTE COUNT: 4.4 THOU/MM3 (ref 1.8–7.7)
SODIUM BLD-SCNC: 132 MEQ/L (ref 135–145)
WBC # BLD: 7.8 THOU/MM3 (ref 4.8–10.8)

## 2018-12-18 PROCEDURE — 84145 PROCALCITONIN (PCT): CPT

## 2018-12-18 PROCEDURE — 96365 THER/PROPH/DIAG IV INF INIT: CPT

## 2018-12-18 PROCEDURE — 2580000003 HC RX 258: Performed by: PHYSICIAN ASSISTANT

## 2018-12-18 PROCEDURE — 85025 COMPLETE CBC W/AUTO DIFF WBC: CPT

## 2018-12-18 PROCEDURE — 73630 X-RAY EXAM OF FOOT: CPT

## 2018-12-18 PROCEDURE — 87147 CULTURE TYPE IMMUNOLOGIC: CPT

## 2018-12-18 PROCEDURE — 96366 THER/PROPH/DIAG IV INF ADDON: CPT

## 2018-12-18 PROCEDURE — 99283 EMERGENCY DEPT VISIT LOW MDM: CPT

## 2018-12-18 PROCEDURE — 83605 ASSAY OF LACTIC ACID: CPT

## 2018-12-18 PROCEDURE — 85651 RBC SED RATE NONAUTOMATED: CPT

## 2018-12-18 PROCEDURE — 87040 BLOOD CULTURE FOR BACTERIA: CPT

## 2018-12-18 PROCEDURE — 87801 DETECT AGNT MULT DNA AMPLI: CPT

## 2018-12-18 PROCEDURE — 82948 REAGENT STRIP/BLOOD GLUCOSE: CPT

## 2018-12-18 PROCEDURE — 96367 TX/PROPH/DG ADDL SEQ IV INF: CPT

## 2018-12-18 PROCEDURE — 6360000002 HC RX W HCPCS: Performed by: PHYSICIAN ASSISTANT

## 2018-12-18 PROCEDURE — 36415 COLL VENOUS BLD VENIPUNCTURE: CPT

## 2018-12-18 PROCEDURE — 80048 BASIC METABOLIC PNL TOTAL CA: CPT

## 2018-12-18 RX ORDER — SULFAMETHOXAZOLE AND TRIMETHOPRIM 800; 160 MG/1; MG/1
1 TABLET ORAL 2 TIMES DAILY
Qty: 20 TABLET | Refills: 0 | Status: SHIPPED | OUTPATIENT
Start: 2018-12-18 | End: 2018-12-28

## 2018-12-18 RX ORDER — CEPHALEXIN 500 MG/1
500 CAPSULE ORAL 4 TIMES DAILY
Qty: 40 CAPSULE | Refills: 0 | Status: SHIPPED | OUTPATIENT
Start: 2018-12-18 | End: 2018-12-24

## 2018-12-18 RX ORDER — 0.9 % SODIUM CHLORIDE 0.9 %
1000 INTRAVENOUS SOLUTION INTRAVENOUS ONCE
Status: COMPLETED | OUTPATIENT
Start: 2018-12-18 | End: 2018-12-18

## 2018-12-18 RX ORDER — TRAMADOL HYDROCHLORIDE 50 MG/1
50 TABLET ORAL EVERY 6 HOURS PRN
Qty: 12 TABLET | Refills: 0 | Status: SHIPPED | OUTPATIENT
Start: 2018-12-18 | End: 2018-12-21

## 2018-12-18 RX ADMIN — SODIUM CHLORIDE 1000 ML: 9 INJECTION, SOLUTION INTRAVENOUS at 12:52

## 2018-12-18 RX ADMIN — SODIUM CHLORIDE 1000 ML: 9 INJECTION, SOLUTION INTRAVENOUS at 10:29

## 2018-12-18 RX ADMIN — CEFTRIAXONE SODIUM 1 G: 1 INJECTION, POWDER, FOR SOLUTION INTRAMUSCULAR; INTRAVENOUS at 10:58

## 2018-12-18 RX ADMIN — VANCOMYCIN HYDROCHLORIDE 1500 MG: 1 INJECTION, POWDER, LYOPHILIZED, FOR SOLUTION INTRAVENOUS at 12:52

## 2018-12-18 ASSESSMENT — ENCOUNTER SYMPTOMS
SORE THROAT: 0
COUGH: 0
WHEEZING: 0
BACK PAIN: 0
EYE DISCHARGE: 0
ABDOMINAL PAIN: 0
NAUSEA: 0
EYE REDNESS: 0
DIARRHEA: 0
SHORTNESS OF BREATH: 0
VOMITING: 0
RHINORRHEA: 0

## 2018-12-18 ASSESSMENT — PAIN DESCRIPTION - LOCATION
LOCATION: TOE (COMMENT WHICH ONE)
LOCATION: TOE (COMMENT WHICH ONE)

## 2018-12-18 ASSESSMENT — PAIN SCALES - GENERAL
PAINLEVEL_OUTOF10: 5
PAINLEVEL_OUTOF10: 3
PAINLEVEL_OUTOF10: 8
PAINLEVEL_OUTOF10: 3

## 2018-12-18 ASSESSMENT — PAIN DESCRIPTION - FREQUENCY: FREQUENCY: CONTINUOUS

## 2018-12-18 ASSESSMENT — PAIN DESCRIPTION - DIRECTION: RADIATING_TOWARDS: LEFT LITTLE TOE

## 2018-12-18 ASSESSMENT — PAIN DESCRIPTION - PAIN TYPE
TYPE: ACUTE PAIN
TYPE: ACUTE PAIN

## 2018-12-18 ASSESSMENT — PAIN DESCRIPTION - ORIENTATION
ORIENTATION: LEFT
ORIENTATION: LEFT

## 2018-12-18 ASSESSMENT — PAIN DESCRIPTION - DESCRIPTORS: DESCRIPTORS: SHARP

## 2018-12-18 NOTE — ED TRIAGE NOTES
Pt with necrotic center ulcer to left outer little toe that is painful. . Pt states that he knows it is infected because was x-rayed. Pt states that is on Bactrim. Pt states getting worse. Pain is unbearable especially when walks.

## 2018-12-18 NOTE — ED PROVIDER NOTES
well-nourished. No distress. HENT:   Head: Normocephalic and atraumatic. Right Ear: External ear normal.   Left Ear: External ear normal.   Eyes: Conjunctivae are normal.   Neck: Normal range of motion. Neck supple. No thyromegaly present. Cardiovascular: Normal rate, regular rhythm and normal heart sounds. No murmur heard. Pulmonary/Chest: Effort normal and breath sounds normal. No respiratory distress. He has no decreased breath sounds. He has no wheezes. He has no rhonchi. He has no rales. He exhibits no tenderness. Abdominal: Soft. He exhibits no distension. There is no tenderness. Musculoskeletal: Normal range of motion. He exhibits no edema. Neurological: He is alert and oriented to person, place, and time. No cranial nerve deficit. Skin: Skin is warm and dry. No rash noted. He is not diaphoretic. No erythema. No pallor. Ulcer on lateral aspect of left pinky toe. Psychiatric: He has a normal mood and affect. His behavior is normal. Judgment and thought content normal.   Nursing note and vitals reviewed.           DIFFERENTIAL DIAGNOSIS:   Osteomyelitis, diabetic ulcer    DIAGNOSTIC RESULTS     EKG: All EKG's are interpreted by the Emergency Department Physician who either signs or Co-signs this chart in the absence of a cardiologist.    None    RADIOLOGY: non-plainfilm images(s) such as CT, Ultrasound and MRI are read by the radiologist.    None    LABS:     Labs Reviewed   BASIC METABOLIC PANEL - Abnormal; Notable for the following:        Result Value    Sodium 132 (*)     Chloride 93 (*)     CO2 22 (*)     Glucose 497 (*)     All other components within normal limits   SEDIMENTATION RATE - Abnormal; Notable for the following:     Sed Rate 22 (*)     All other components within normal limits   LACTATE, SEPSIS - Abnormal; Notable for the following:     Lactic Acid, Sepsis 4.7 (*)     All other components within normal limits   LACTATE, SEPSIS - Abnormal; Notable for the following: Lactic Acid, Sepsis 2.2 (*)     All other components within normal limits   ANION GAP - Abnormal; Notable for the following: Anion Gap 17.0 (*)     All other components within normal limits   POCT GLUCOSE - Abnormal; Notable for the following:     POC Glucose 403 (*)     All other components within normal limits   POCT GLUCOSE - Abnormal; Notable for the following:     POC Glucose 291 (*)     All other components within normal limits   POCT GLUCOSE - Normal   CULTURE BLOOD #1   CULTURE BLOOD #2   CBC WITH AUTO DIFFERENTIAL   PROCALCITONIN   OSMOLALITY   GLOMERULAR FILTRATION RATE, ESTIMATED       EMERGENCY DEPARTMENT COURSE:   Vitals:    Vitals:    12/18/18 1132 12/18/18 1251 12/18/18 1459 12/18/18 1513   BP: 136/85 131/76 (!) 167/95    Pulse: 98 96 92 89   Resp: 20 16 14    Temp:       TempSrc:       SpO2: 97% 97% 98%    Weight:       Height:           10:03 AM: The patient was seen and evaluated. MDM:  The patient presents to the ED with complaints of wound on left pink toe. The patient was afebrile and not in any acute distress. The patient's physical exam showed an ulcer on the lateral aspect of left pinky toe. Within the department, the patient was treated with Vancocin, IV bolus and Rocephin. Patient's status improved during the duration of their stay. Labs and imaging were ordered, and reviewed with the patient. POC glucose went from 403 down to 291. ESR was 22. CBC was normal. Lacticsepsis went from 4.7 down to 2.2. XR left foot showed a subtle skin deformity along the lateral margin of the 5th toe centered at the level of the 5th proximal interphalangeal articulation which may correspond to the clinically described soft tissue ulcer. There is no osseous destruction or aggressive periostitis within this region to suggest acute osteomyelitis. I explained my proposed course of treatment with the patient, and they were amenable to my decision.  The patient will be discharged home with Ana Child and Ultram, and will need to follow-up with Benjamin Negro MD in 1 day. The patient will need to come back to the ER if their symptoms worsen, or become more severe in nature. Case was staffed with Dr Cesar Mata. Pt was given 2 liters of NS, Rocephin and Vancomycin. Case discussed with Dr Eric Cristobal. Lactic acid trended down. The HR trended down to 89. Dr Eric Cristobal can see in the clinic tomorrow. Pt has no other infectious complaints at this time. CRITICAL CARE:   None     CONSULTS:  Dr Valverde Slot:  None    FINAL IMPRESSION      1. Type 2 diabetes mellitus with left diabetic foot ulcer ([facility] [address]          DISPOSITION/PLAN   Discharged    PATIENTREFERRED TO:  Benjamin Negro, [address]  [address]  [phone]    In 1 day  Call office in for appt tomorrow. He will see you in clinic      DISCHARGE MEDICATIONS:  Discharge Medication List as of 12/18/2018  3:19 PM      START taking these medications    Details   sulfamethoxazole-trimethoprim (BACTRIM DS) 800-160 MG per tablet Take 1 tablet by mouth 2 times daily for 10 days, Disp-20 tablet, R-0Print      cephALEXin (KEFLEX) 500 MG capsule Take 1 capsule by mouth 4 times daily for 10 days, Disp-40 capsule, R-0Print      traMADol (ULTRAM) 50 MG tablet Take 1 tablet by mouth every 6 hours as needed for Pain for up to 3 days. ., Disp-12 tablet, R-0Print             (Please note that portions of this note were completed with a voice recognition program.  Efforts were made to edit the dictations but occasionally words are mis-transcribed.)    The patient was given an opportunity to see the Emergency Attending. The patient voiced understanding that I was a Mid-Level Provider and was inagreement with being seen independently by myself.      Scribe:  Alexa Mari 12/23/16 10:31 AM Scribing for and in the presence of Teodoro Hicks, [facility].     Signed by: Denia Curtis, 12/18/18 3:57 PM      Provider:  I personally

## 2018-12-19 ENCOUNTER — HOSPITAL ENCOUNTER (OUTPATIENT)
Dept: WOUND CARE | Age: 48
Discharge: HOME | End: 2018-12-19
Payer: MEDICARE

## 2018-12-19 VITALS
RESPIRATION RATE: 18 BRPM | DIASTOLIC BLOOD PRESSURE: 67 MMHG | TEMPERATURE: 98.3 F | HEART RATE: 91 BPM | OXYGEN SATURATION: 98 % | SYSTOLIC BLOOD PRESSURE: 150 MMHG

## 2018-12-19 LAB
ACINETOBACTER BAUMANNII FILM ARRAY: NOT DETECTED
BOTTLE TYPE: ABNORMAL
CANDIDA ALBICANS FILM ARRAY: NOT DETECTED
CANDIDA GLABRATA FILM ARRAY: NOT DETECTED
CANDIDA KRUSEI FILM ARRAY: NOT DETECTED
CANDIDA PARAPSILOSIS FILM ARRAY: NOT DETECTED
CANDIDA TROPICALIS FILM ARRAY: NOT DETECTED
CARBAPENEM RESITANT FILM ARRAY: ABNORMAL
ENTERBACTER CLOACAE FILM ARRAY: NOT DETECTED
ENTERBACTERIACEAE FILM ARRAY: NOT DETECTED
ENTEROCOCCUS FILM ARRAY: NOT DETECTED
ESCHERICHIA COLI FILM ARRAY: NOT DETECTED
HAEMOPHILUS INFLUENZA FILM ARRAY: NOT DETECTED
KLEBSIELLA OXYTOCA FILM ARRAY: NOT DETECTED
KLEBSIELLA PNEUMONIAE FILM ARRAY: NOT DETECTED
LISTERIA MONOCYTOGENES FILM ARRAY: NOT DETECTED
METHICILLIN RESISTANT FILM ARRAY: NOT DETECTED
NEISSERIA MENIGITIDIS FILM ARRAY: NOT DETECTED
PROTEUS FILM ARRAY: NOT DETECTED
PSEUDOMONAS AERUGINOSA FILM ARRAY: NOT DETECTED
SERRATIA MARCESCENS FILM ARRAY: NOT DETECTED
SOURCE OF BLOOD CULTURE: ABNORMAL
STAPH AUREUS FILM ARRAY: NOT DETECTED
STAPHYLOCOCCUS FILM ARRAY: DETECTED
STREP AGALACTIAE FILM ARRAY: NOT DETECTED
STREP PNEUMONIAE FILM ARRAY: NOT DETECTED
STREP PYOCGENES FILM ARRAY: NOT DETECTED
STREPTOCOCCUS FILM ARRAY: NOT DETECTED
VANCOMYCIN RESISTANT FILM ARRAY: ABNORMAL

## 2018-12-19 PROCEDURE — 99213 OFFICE O/P EST LOW 20 MIN: CPT

## 2018-12-19 ASSESSMENT — PAIN DESCRIPTION - ORIENTATION: ORIENTATION: LEFT

## 2018-12-19 ASSESSMENT — PAIN DESCRIPTION - DESCRIPTORS: DESCRIPTORS: CONSTANT

## 2018-12-19 ASSESSMENT — PAIN DESCRIPTION - PAIN TYPE: TYPE: ACUTE PAIN

## 2018-12-19 ASSESSMENT — PAIN DESCRIPTION - LOCATION: LOCATION: FOOT

## 2018-12-19 ASSESSMENT — PAIN SCALES - GENERAL: PAINLEVEL_OUTOF10: 8

## 2018-12-19 NOTE — PROGRESS NOTES
07/20/2016    OTHER SURGICAL HISTORY Right 1/14/15    sole of foot I&D    SD DRAIN INFECT SHOULDER BURSA Left 8/18/2017    LEFT SHOULDER INCISION AND DRAINAGE performed by Lucio eMier MD at [facility] OFFICE/OUTPT [facility] Right 9/20/2018    EXCISIONAL DEBRIDEMENT RIGHT BKA STUMP performed by Monique Chou MD at [address] Right 1/16/15    2nd toe with wound vac applied    WISDOM TOOTH EXTRACTION  ? when     FAMILY HISTORY         Family History   Problem Relation Age of Onset    Diabetes Mother     Other Mother         pneumonia, H1N1    Depression Mother     Early Death Mother     High Blood Pressure Mother     High Cholesterol Mother     Vision Loss Maternal Grandmother     Arthritis Maternal Grandfather     Heart Disease Maternal Grandfather      SOCIAL HISTORY       Social History   Substance Use Topics    Smoking status: Former Smoker     Packs/day: 5.00     Years: 13.00     Types: Cigars     Start date: 3/1/2018     Quit date: 9/1/2018    Smokeless tobacco: Never Used    Alcohol use No      Comment: last drink \"several years ago\"     ALLERGIES         Allergies   Allergen Reactions    Pcn [Penicillins] Shortness Of Breath, Other (See Comments) and Nausea And Vomiting     Does not remember reactions    Clindamycin/Lincomycin Itching     MEDICATIONS         Current Outpatient Prescriptions on File Prior to Encounter   Medication Sig Dispense Refill    insulin glargine (BASAGLAR KWIKPEN) 100 UNIT/ML injection pen Inject 55 Units into the skin 2 times daily      sulfamethoxazole-trimethoprim (BACTRIM DS) 800-160 MG per tablet Take 1 tablet by mouth 2 times daily for 10 days 20 tablet 0    traMADol (ULTRAM) 50 MG tablet Take 1 tablet by mouth every 6 hours as needed for Pain for up to 3 days. . 12 tablet 0    Continuous Blood Gluc  (FREESTYLE MARIANELA READER) LIANNE 1 Units by Does not apply route continuous 1 Device 0    insulin aspart (NOVOLOG) 100

## 2018-12-20 LAB
BLOOD CULTURE, ROUTINE: ABNORMAL
BLOOD CULTURE, ROUTINE: ABNORMAL
ORGANISM: ABNORMAL

## 2018-12-23 LAB — BLOOD CULTURE, ROUTINE: NORMAL

## 2018-12-24 ENCOUNTER — HOSPITAL ENCOUNTER (EMERGENCY)
Age: 48
Discharge: HOME | End: 2018-12-24
Payer: MEDICARE

## 2018-12-24 VITALS
OXYGEN SATURATION: 100 % | HEIGHT: 66 IN | BODY MASS INDEX: 36.8 KG/M2 | HEART RATE: 103 BPM | SYSTOLIC BLOOD PRESSURE: 163 MMHG | RESPIRATION RATE: 16 BRPM | TEMPERATURE: 97.8 F | WEIGHT: 229 LBS | DIASTOLIC BLOOD PRESSURE: 77 MMHG

## 2018-12-24 DIAGNOSIS — E11.621 DIABETIC ULCER OF LEFT FIFTH TOE (HCC): ICD-10-CM

## 2018-12-24 DIAGNOSIS — Z51.89 VISIT FOR WOUND CHECK: Primary | ICD-10-CM

## 2018-12-24 DIAGNOSIS — L97.529 DIABETIC ULCER OF LEFT FIFTH TOE (HCC): ICD-10-CM

## 2018-12-24 PROCEDURE — 99282 EMERGENCY DEPT VISIT SF MDM: CPT

## 2018-12-24 PROCEDURE — 2709999900 HC NON-CHARGEABLE SUPPLY

## 2018-12-24 NOTE — ED PROVIDER NOTES
[address]  Pt Name: Clifton Blackman  MRN: [id number]  [place] 1970  Date of evaluation: 12/24/2018  Provider: MARILYN Hernandez CNP    CHIEF COMPLAINT       Chief Complaint   Patient presents with    Wound Infection    Toe Pain       NursesNotes reviewed and I agree except as noted in the HPI. HISTORY OF PRESENT ILLNESS    Clifton Blackman is a 46 y.o. male who presents to the emergency department from home for the evaluation of a bleeding diabetic ulcer to his left foot. The patient states that he came home from [place] this evening and took off his shoe. He states that he believes a scab on the ulcer got caught on his sock and was ripped off when he took his sock off. The patient states that his ulcer bled and he was concerned that something was wrong. The patient states that he follows with Kirti Murillo (podiatrist), and Dr. Manish Anderson, (infectious disease specialist). The patient states that his toe was x-rayed 5-6 days ago and no osteomyelitis was noted. The patient is currently on bactrim. He denies any other signs or symptoms at this time. Triage notesand Nursing notes were reviewed by myself. Any discrepancies are addressed above. REVIEW OF SYSTEMS     Review of Systems   Skin: Positive for wound (bleeding diabetic ulcer to the left foot). All pertinent systems were reviewed and are negative unless indicated in the HPI. PAST MEDICAL HISTORY    has a past medical history of Bipolar 2 disorder ([address] Diabetes mellitus type 2, uncontrolled ([address] Diabetic foot ulcer ([address] Diabetic polyneuropathy ([address] Diabetic ulcer of right foot associated with type 2 diabetes mellitus ([address] Essential hypertension; GERD (gastroesophageal reflux disease); Hammer toe of left foot; Heart murmur; History of tobacco abuse; Hx of BKA, right ([address] Macular edema, diabetic, bilateral ([address] Marijuana abuse in remission;  Onychomycosis; and WD-Skin ulcer of fourth toe of right pulses were intact to the left DP and PT pulses. The patient had a right below the knee amputation. I did not feel imaging studies were warranted due to the recent x-ray studies performed as an outpatient. The wound has been cultured by his podiatrist and Dr. Re Correa, infectious diseases. Within the department, the patient's wound was dressed with antibiotic ointment and a non adherent dressing. The patient responded well to treatment, and I noted his condition to remain stable. I decided that the patient could be discharged home with instructions to follow up with his podiatrist and Dr. Re Correa, infectious disease specialist as written below. The patient was being treated with bactrim based on cultures by Dr. Re Correa, and I did not feel it was necessary to change his medications. I explained my proposed course of discharge to the patient, and he verbalized understanding and agreement with my proposed plan. All his questions were addressed at bedside. The patient will return to the emergency department for any new or worsening symptoms. Strict return precautions and follow up instructions were discussed with the patient with which the patient agrees     Physical assessment findings, diagnostic testing(s) if applicable, and vital signs reviewed with patient/patient representative. Questions answered. Medications as directed, including OTC medications for supportivecare. Education provided on medications. Differential diagnosis(s) discussed with patient/patient representative. Home care/self care instructions reviewed with patient/patient representative. Patient is tofollow-up with family care provider in 2-3 days if no improvement. Patient is to go to the emergency department if symptoms worsen. Patient/patient representative is aware of care plan, questions answered, verbalizesunderstanding and is in agreement.      ED Medications administered this visit:  Medications - No data to display        I have given the patient strict written and verbal instructions about care at home, follow-up, and signsand symptoms of worsening of condition and they did verbalize understanding. Patient was seen independently by myself. The Patient's finalimpression and disposition and plan was determined by myself. CRITICAL CARE:   None    CONSULTS:  None    PROCEDURES:  None    FINAL IMPRESSION      1. Visit for wound check    2. Diabetic ulcer of left fifth toe [facility])          DISPOSITION/PLAN   DISPOSITION Decision To Discharge 12/24/2018 01:13:37 AM        PATIENT REFERRED TO:  Belinda Howard, [address] [facility] [place] [facility]  [address] [facility]  [phone]    Call on 12/26/2018  For follow up    Haylee Quinones, [address] [facility]  [phone]    Call on 12/26/2018  For follow up      DISCHARGE MEDICATIONS:  Discharge Medication List as of 12/24/2018  1:34 AM          (Please note that portions of this note were completed witha voice recognition program.  Efforts were made to edit the dictations but occasionally words are mis-transcribed.)    MARILYN Smith CNP            Scribe:  Katherine Hatch 12/24/18 1:16 AM Scribing for and in the presence of Klaudia Champagne CNP. Signed by: Denia Recio12/25/18 4:21 AM    Provider:  I personally performed the services described in the documentation, reviewed and edited the documentation which was dictated to the scribe in mypresence, and it accurately records my words and actions.     Klaudia Champagne CNP 12/24/18 4:21 AM        MARILYN Smith CNP, APRN - CNP  12/25/18 [id number]

## 2019-01-03 LAB
INR BLD: 1.06
PROTIME: 12.2 SECONDS

## 2019-02-01 ENCOUNTER — APPOINTMENT (OUTPATIENT)
Dept: GENERAL RADIOLOGY | Age: 49
End: 2019-02-01
Payer: MEDICARE

## 2019-02-01 ENCOUNTER — HOSPITAL ENCOUNTER (OUTPATIENT)
Age: 49
Setting detail: OBSERVATION
Discharge: HOME | End: 2019-02-02
Attending: INTERNAL MEDICINE | Admitting: INTERNAL MEDICINE
Payer: MEDICARE

## 2019-02-01 DIAGNOSIS — R77.8 ELEVATED TROPONIN: Primary | ICD-10-CM

## 2019-02-01 DIAGNOSIS — R00.0 TACHYCARDIA: ICD-10-CM

## 2019-02-01 PROBLEM — R79.89 ELEVATED TROPONIN: Status: ACTIVE | Noted: 2019-02-01

## 2019-02-01 LAB
ALBUMIN SERPL-MCNC: 4.1 G/DL (ref 3.5–5.1)
ALLEN TEST: POSITIVE
ALP BLD-CCNC: 82 U/L (ref 38–126)
ALT SERPL-CCNC: 28 U/L (ref 11–66)
ANION GAP SERPL CALCULATED.3IONS-SCNC: 17 MEQ/L (ref 8–16)
AST SERPL-CCNC: 35 U/L (ref 5–40)
BASE EXCESS (CALCULATED): -1 MMOL/L (ref -2.5–2.5)
BASOPHILS # BLD: 0.5 %
BASOPHILS ABSOLUTE: 0 THOU/MM3 (ref 0–0.1)
BILIRUB SERPL-MCNC: 0.4 MG/DL (ref 0.3–1.2)
BILIRUBIN DIRECT: < 0.2 MG/DL (ref 0–0.3)
BILIRUBIN URINE: NEGATIVE
BLOOD, URINE: NEGATIVE
BUN BLDV-MCNC: 20 MG/DL (ref 7–22)
CALCIUM SERPL-MCNC: 8.8 MG/DL (ref 8.5–10.5)
CHARACTER, URINE: CLEAR
CHLORIDE BLD-SCNC: 89 MEQ/L (ref 98–111)
CO2: 21 MEQ/L (ref 23–33)
COLLECTED BY:: ABNORMAL
COLOR: YELLOW
CREAT SERPL-MCNC: 0.8 MG/DL (ref 0.4–1.2)
DEVICE: ABNORMAL
EKG ATRIAL RATE: 110 BPM
EKG P AXIS: 59 DEGREES
EKG P-R INTERVAL: 160 MS
EKG Q-T INTERVAL: 328 MS
EKG QRS DURATION: 76 MS
EKG QTC CALCULATION (BAZETT): 443 MS
EKG R AXIS: 53 DEGREES
EKG T AXIS: 74 DEGREES
EKG VENTRICULAR RATE: 110 BPM
EOSINOPHIL # BLD: 2.8 %
EOSINOPHILS ABSOLUTE: 0.3 THOU/MM3 (ref 0–0.4)
ERYTHROCYTE [DISTWIDTH] IN BLOOD BY AUTOMATED COUNT: 13.8 % (ref 11.5–14.5)
ERYTHROCYTE [DISTWIDTH] IN BLOOD BY AUTOMATED COUNT: 42.6 FL (ref 35–45)
FLU A ANTIGEN: NEGATIVE
FLU B ANTIGEN: NEGATIVE
GFR SERPL CREATININE-BSD FRML MDRD: > 90 ML/MIN/1.73M2
GLUCOSE BLD-MCNC: 251 MG/DL (ref 70–108)
GLUCOSE BLD-MCNC: 359 MG/DL (ref 70–108)
GLUCOSE BLD-MCNC: 381 MG/DL (ref 70–108)
GLUCOSE BLD-MCNC: 388 MG/DL (ref 70–108)
GLUCOSE BLD-MCNC: 447 MG/DL (ref 70–108)
GLUCOSE URINE: >= 1000 MG/DL
HCO3: 23 MMOL/L (ref 23–28)
HCT VFR BLD CALC: 42.9 % (ref 42–52)
HEMOGLOBIN: 15.1 GM/DL (ref 14–18)
IMMATURE GRANS (ABS): 0.06 THOU/MM3 (ref 0–0.07)
IMMATURE GRANULOCYTES: 0.7 %
KETONES, URINE: NEGATIVE
LACTIC ACID: 2.1 MMOL/L (ref 0.5–2.2)
LEUKOCYTE ESTERASE, URINE: NEGATIVE
LYMPHOCYTES # BLD: 15.3 %
LYMPHOCYTES ABSOLUTE: 1.4 THOU/MM3 (ref 1–4.8)
MCH RBC QN AUTO: 29.8 PG (ref 26–33)
MCHC RBC AUTO-ENTMCNC: 35.2 GM/DL (ref 32.2–35.5)
MCV RBC AUTO: 84.6 FL (ref 80–94)
MONOCYTES # BLD: 12.4 %
MONOCYTES ABSOLUTE: 1.1 THOU/MM3 (ref 0.4–1.3)
NITRITE, URINE: NEGATIVE
NUCLEATED RED BLOOD CELLS: 0 /100 WBC
O2 SATURATION: 94 %
OSMOLALITY CALCULATION: 277.2 MOSMOL/KG (ref 275–300)
PCO2: 35 MMHG (ref 35–45)
PH BLOOD GAS: 7.43 (ref 7.35–7.45)
PH UA: 5
PLATELET # BLD: 226 THOU/MM3 (ref 130–400)
PMV BLD AUTO: 9.2 FL (ref 9.4–12.4)
PO2: 68 MMHG (ref 71–104)
POTASSIUM SERPL-SCNC: 4.6 MEQ/L (ref 3.5–5.2)
PRO-BNP: 11.5 PG/ML (ref 0–900)
PROCALCITONIN: 0.12 NG/ML (ref 0.01–0.09)
PROTEIN UA: NEGATIVE
RBC # BLD: 5.07 MILL/MM3 (ref 4.7–6.1)
SEG NEUTROPHILS: 68.3 %
SEGMENTED NEUTROPHILS ABSOLUTE COUNT: 6.3 THOU/MM3 (ref 1.8–7.7)
SODIUM BLD-SCNC: 127 MEQ/L (ref 135–145)
SOURCE, BLOOD GAS: ABNORMAL
SPECIFIC GRAVITY, URINE: > 1.03 (ref 1–1.03)
TOTAL PROTEIN: 7.7 G/DL (ref 6.1–8)
TROPONIN T: 0.01 NG/ML
TROPONIN T: < 0.01 NG/ML
UROBILINOGEN, URINE: 0.2 EU/DL
WBC # BLD: 9.2 THOU/MM3 (ref 4.8–10.8)

## 2019-02-01 PROCEDURE — 96372 THER/PROPH/DIAG INJ SC/IM: CPT

## 2019-02-01 PROCEDURE — 6370000000 HC RX 637 (ALT 250 FOR IP): Performed by: INTERNAL MEDICINE

## 2019-02-01 PROCEDURE — 36415 COLL VENOUS BLD VENIPUNCTURE: CPT

## 2019-02-01 PROCEDURE — 83605 ASSAY OF LACTIC ACID: CPT

## 2019-02-01 PROCEDURE — 2709999900 HC NON-CHARGEABLE SUPPLY

## 2019-02-01 PROCEDURE — 87804 INFLUENZA ASSAY W/OPTIC: CPT

## 2019-02-01 PROCEDURE — G0378 HOSPITAL OBSERVATION PER HR: HCPCS

## 2019-02-01 PROCEDURE — 82803 BLOOD GASES ANY COMBINATION: CPT

## 2019-02-01 PROCEDURE — 80076 HEPATIC FUNCTION PANEL: CPT

## 2019-02-01 PROCEDURE — 36600 WITHDRAWAL OF ARTERIAL BLOOD: CPT

## 2019-02-01 PROCEDURE — 84484 ASSAY OF TROPONIN QUANT: CPT

## 2019-02-01 PROCEDURE — 93010 ELECTROCARDIOGRAM REPORT: CPT | Performed by: INTERNAL MEDICINE

## 2019-02-01 PROCEDURE — 83880 ASSAY OF NATRIURETIC PEPTIDE: CPT

## 2019-02-01 PROCEDURE — 81003 URINALYSIS AUTO W/O SCOPE: CPT

## 2019-02-01 PROCEDURE — 6360000002 HC RX W HCPCS: Performed by: INTERNAL MEDICINE

## 2019-02-01 PROCEDURE — 6370000000 HC RX 637 (ALT 250 FOR IP): Performed by: EMERGENCY MEDICINE

## 2019-02-01 PROCEDURE — 96374 THER/PROPH/DIAG INJ IV PUSH: CPT

## 2019-02-01 PROCEDURE — 87040 BLOOD CULTURE FOR BACTERIA: CPT

## 2019-02-01 PROCEDURE — 80048 BASIC METABOLIC PNL TOTAL CA: CPT

## 2019-02-01 PROCEDURE — 93005 ELECTROCARDIOGRAM TRACING: CPT | Performed by: EMERGENCY MEDICINE

## 2019-02-01 PROCEDURE — 99223 1ST HOSP IP/OBS HIGH 75: CPT | Performed by: INTERNAL MEDICINE

## 2019-02-01 PROCEDURE — 96365 THER/PROPH/DIAG IV INF INIT: CPT

## 2019-02-01 PROCEDURE — 84145 PROCALCITONIN (PCT): CPT

## 2019-02-01 PROCEDURE — 99285 EMERGENCY DEPT VISIT HI MDM: CPT

## 2019-02-01 PROCEDURE — 96375 TX/PRO/DX INJ NEW DRUG ADDON: CPT

## 2019-02-01 PROCEDURE — 2580000003 HC RX 258: Performed by: EMERGENCY MEDICINE

## 2019-02-01 PROCEDURE — 71046 X-RAY EXAM CHEST 2 VIEWS: CPT

## 2019-02-01 PROCEDURE — 85025 COMPLETE CBC W/AUTO DIFF WBC: CPT

## 2019-02-01 PROCEDURE — 82948 REAGENT STRIP/BLOOD GLUCOSE: CPT

## 2019-02-01 RX ORDER — SODIUM CHLORIDE 0.9 % (FLUSH) 0.9 %
10 SYRINGE (ML) INJECTION PRN
Status: DISCONTINUED | OUTPATIENT
Start: 2019-02-01 | End: 2019-02-02 | Stop reason: HOSPADM

## 2019-02-01 RX ORDER — INSULIN GLARGINE 100 [IU]/ML
55 INJECTION, SOLUTION SUBCUTANEOUS 2 TIMES DAILY
Status: DISCONTINUED | OUTPATIENT
Start: 2019-02-01 | End: 2019-02-02 | Stop reason: HOSPADM

## 2019-02-01 RX ORDER — 0.9 % SODIUM CHLORIDE 0.9 %
500 INTRAVENOUS SOLUTION INTRAVENOUS ONCE
Status: COMPLETED | OUTPATIENT
Start: 2019-02-01 | End: 2019-02-01

## 2019-02-01 RX ORDER — ONDANSETRON 2 MG/ML
4 INJECTION INTRAMUSCULAR; INTRAVENOUS EVERY 6 HOURS PRN
Status: DISCONTINUED | OUTPATIENT
Start: 2019-02-01 | End: 2019-02-02 | Stop reason: HOSPADM

## 2019-02-01 RX ORDER — SODIUM CHLORIDE 0.9 % (FLUSH) 0.9 %
10 SYRINGE (ML) INJECTION EVERY 12 HOURS SCHEDULED
Status: DISCONTINUED | OUTPATIENT
Start: 2019-02-01 | End: 2019-02-02 | Stop reason: HOSPADM

## 2019-02-01 RX ORDER — LEVOFLOXACIN 5 MG/ML
750 INJECTION, SOLUTION INTRAVENOUS EVERY 24 HOURS
Status: DISCONTINUED | OUTPATIENT
Start: 2019-02-01 | End: 2019-02-02 | Stop reason: HOSPADM

## 2019-02-01 RX ORDER — SODIUM CHLORIDE 9 MG/ML
INJECTION, SOLUTION INTRAVENOUS CONTINUOUS
Status: DISCONTINUED | OUTPATIENT
Start: 2019-02-01 | End: 2019-02-02 | Stop reason: HOSPADM

## 2019-02-01 RX ORDER — MIRTAZAPINE 15 MG/1
15 TABLET, FILM COATED ORAL NIGHTLY
Status: DISCONTINUED | OUTPATIENT
Start: 2019-02-01 | End: 2019-02-02 | Stop reason: HOSPADM

## 2019-02-01 RX ORDER — DEXTROSE MONOHYDRATE 25 G/50ML
12.5 INJECTION, SOLUTION INTRAVENOUS PRN
Status: DISCONTINUED | OUTPATIENT
Start: 2019-02-01 | End: 2019-02-02 | Stop reason: HOSPADM

## 2019-02-01 RX ORDER — DEXTROSE MONOHYDRATE 50 MG/ML
100 INJECTION, SOLUTION INTRAVENOUS PRN
Status: DISCONTINUED | OUTPATIENT
Start: 2019-02-01 | End: 2019-02-02 | Stop reason: HOSPADM

## 2019-02-01 RX ORDER — SODIUM CHLORIDE 450 MG/100ML
INJECTION, SOLUTION INTRAVENOUS CONTINUOUS
Status: DISCONTINUED | OUTPATIENT
Start: 2019-02-01 | End: 2019-02-01

## 2019-02-01 RX ORDER — 0.9 % SODIUM CHLORIDE 0.9 %
1000 INTRAVENOUS SOLUTION INTRAVENOUS ONCE
Status: COMPLETED | OUTPATIENT
Start: 2019-02-01 | End: 2019-02-01

## 2019-02-01 RX ORDER — FAMOTIDINE 20 MG/1
20 TABLET, FILM COATED ORAL 2 TIMES DAILY
Status: DISCONTINUED | OUTPATIENT
Start: 2019-02-01 | End: 2019-02-02 | Stop reason: HOSPADM

## 2019-02-01 RX ORDER — NICOTINE POLACRILEX 4 MG
15 LOZENGE BUCCAL PRN
Status: DISCONTINUED | OUTPATIENT
Start: 2019-02-01 | End: 2019-02-02 | Stop reason: HOSPADM

## 2019-02-01 RX ORDER — ACETAMINOPHEN 500 MG
1000 TABLET ORAL EVERY 6 HOURS PRN
Status: DISCONTINUED | OUTPATIENT
Start: 2019-02-01 | End: 2019-02-02 | Stop reason: HOSPADM

## 2019-02-01 RX ADMIN — INSULIN LISPRO 5 UNITS: 100 INJECTION, SOLUTION INTRAVENOUS; SUBCUTANEOUS at 20:59

## 2019-02-01 RX ADMIN — FAMOTIDINE 20 MG: 20 TABLET ORAL at 20:59

## 2019-02-01 RX ADMIN — Medication 10 UNITS: at 18:04

## 2019-02-01 RX ADMIN — INSULIN GLARGINE 55 UNITS: 100 INJECTION, SOLUTION SUBCUTANEOUS at 20:58

## 2019-02-01 RX ADMIN — LEVOFLOXACIN 750 MG: 5 INJECTION, SOLUTION INTRAVENOUS at 18:02

## 2019-02-01 RX ADMIN — Medication 10 UNITS: at 14:15

## 2019-02-01 RX ADMIN — ENOXAPARIN SODIUM 40 MG: 40 INJECTION SUBCUTANEOUS at 18:03

## 2019-02-01 RX ADMIN — MIRTAZAPINE 15 MG: 15 TABLET, FILM COATED ORAL at 21:23

## 2019-02-01 RX ADMIN — SODIUM CHLORIDE 500 ML: 9 INJECTION, SOLUTION INTRAVENOUS at 13:32

## 2019-02-01 RX ADMIN — SODIUM CHLORIDE 1000 ML: 9 INJECTION, SOLUTION INTRAVENOUS at 11:50

## 2019-02-01 RX ADMIN — SODIUM CHLORIDE 500 ML: 9 INJECTION, SOLUTION INTRAVENOUS at 15:33

## 2019-02-01 ASSESSMENT — ENCOUNTER SYMPTOMS
NAUSEA: 1
DIARRHEA: 1
SHORTNESS OF BREATH: 0
VOMITING: 1
COLOR CHANGE: 0
COUGH: 1
SORE THROAT: 0

## 2019-02-01 ASSESSMENT — PAIN DESCRIPTION - ONSET: ONSET: ON-GOING

## 2019-02-01 ASSESSMENT — PAIN DESCRIPTION - DESCRIPTORS: DESCRIPTORS: THROBBING

## 2019-02-01 ASSESSMENT — PAIN DESCRIPTION - LOCATION: LOCATION: FOOT

## 2019-02-01 ASSESSMENT — PAIN DESCRIPTION - PAIN TYPE: TYPE: ACUTE PAIN

## 2019-02-01 ASSESSMENT — PAIN SCALES - GENERAL
PAINLEVEL_OUTOF10: 0
PAINLEVEL_OUTOF10: 8
PAINLEVEL_OUTOF10: 0

## 2019-02-01 ASSESSMENT — PAIN DESCRIPTION - ORIENTATION: ORIENTATION: LEFT

## 2019-02-01 ASSESSMENT — PAIN DESCRIPTION - FREQUENCY: FREQUENCY: INTERMITTENT

## 2019-02-01 ASSESSMENT — PAIN DESCRIPTION - PROGRESSION: CLINICAL_PROGRESSION: NOT CHANGED

## 2019-02-02 ENCOUNTER — APPOINTMENT (OUTPATIENT)
Dept: GENERAL RADIOLOGY | Age: 49
End: 2019-02-02
Payer: MEDICARE

## 2019-02-02 VITALS
DIASTOLIC BLOOD PRESSURE: 70 MMHG | BODY MASS INDEX: 36 KG/M2 | HEIGHT: 66 IN | HEART RATE: 95 BPM | RESPIRATION RATE: 18 BRPM | TEMPERATURE: 98.2 F | WEIGHT: 224 LBS | SYSTOLIC BLOOD PRESSURE: 134 MMHG | OXYGEN SATURATION: 97 %

## 2019-02-02 LAB
ALBUMIN SERPL-MCNC: 3.3 G/DL (ref 3.5–5.1)
ALP BLD-CCNC: 66 U/L (ref 38–126)
ALT SERPL-CCNC: 20 U/L (ref 11–66)
ANION GAP SERPL CALCULATED.3IONS-SCNC: 10 MEQ/L (ref 8–16)
AST SERPL-CCNC: 26 U/L (ref 5–40)
BASOPHILS # BLD: 0.5 %
BASOPHILS ABSOLUTE: 0 THOU/MM3 (ref 0–0.1)
BILIRUB SERPL-MCNC: 0.4 MG/DL (ref 0.3–1.2)
BILIRUBIN DIRECT: < 0.2 MG/DL (ref 0–0.3)
BUN BLDV-MCNC: 10 MG/DL (ref 7–22)
CALCIUM SERPL-MCNC: 8.7 MG/DL (ref 8.5–10.5)
CHLORIDE BLD-SCNC: 105 MEQ/L (ref 98–111)
CO2: 22 MEQ/L (ref 23–33)
CREAT SERPL-MCNC: 0.6 MG/DL (ref 0.4–1.2)
EKG ATRIAL RATE: 96 BPM
EKG P AXIS: 39 DEGREES
EKG P-R INTERVAL: 154 MS
EKG Q-T INTERVAL: 356 MS
EKG QRS DURATION: 78 MS
EKG QTC CALCULATION (BAZETT): 449 MS
EKG R AXIS: 8 DEGREES
EKG T AXIS: 65 DEGREES
EKG VENTRICULAR RATE: 96 BPM
EOSINOPHIL # BLD: 3.8 %
EOSINOPHILS ABSOLUTE: 0.3 THOU/MM3 (ref 0–0.4)
ERYTHROCYTE [DISTWIDTH] IN BLOOD BY AUTOMATED COUNT: 14.1 % (ref 11.5–14.5)
ERYTHROCYTE [DISTWIDTH] IN BLOOD BY AUTOMATED COUNT: 44.2 FL (ref 35–45)
GFR SERPL CREATININE-BSD FRML MDRD: > 90 ML/MIN/1.73M2
GLUCOSE BLD-MCNC: 196 MG/DL (ref 70–108)
GLUCOSE BLD-MCNC: 204 MG/DL (ref 70–108)
GLUCOSE BLD-MCNC: 271 MG/DL (ref 70–108)
HCT VFR BLD CALC: 39.5 % (ref 42–52)
HEMOGLOBIN: 13.5 GM/DL (ref 14–18)
IMMATURE GRANS (ABS): 0.04 THOU/MM3 (ref 0–0.07)
IMMATURE GRANULOCYTES: 0.5 %
LYMPHOCYTES # BLD: 28.8 %
LYMPHOCYTES ABSOLUTE: 2.2 THOU/MM3 (ref 1–4.8)
MCH RBC QN AUTO: 29.7 PG (ref 26–33)
MCHC RBC AUTO-ENTMCNC: 34.2 GM/DL (ref 32.2–35.5)
MCV RBC AUTO: 86.8 FL (ref 80–94)
MONOCYTES # BLD: 12.5 %
MONOCYTES ABSOLUTE: 1 THOU/MM3 (ref 0.4–1.3)
NUCLEATED RED BLOOD CELLS: 0 /100 WBC
PLATELET # BLD: 190 THOU/MM3 (ref 130–400)
PMV BLD AUTO: 9.1 FL (ref 9.4–12.4)
POTASSIUM REFLEX MAGNESIUM: 4.1 MEQ/L (ref 3.5–5.2)
PROCALCITONIN: 0.09 NG/ML (ref 0.01–0.09)
RBC # BLD: 4.55 MILL/MM3 (ref 4.7–6.1)
SEG NEUTROPHILS: 53.9 %
SEGMENTED NEUTROPHILS ABSOLUTE COUNT: 4.2 THOU/MM3 (ref 1.8–7.7)
SODIUM BLD-SCNC: 137 MEQ/L (ref 135–145)
TOTAL PROTEIN: 6.8 G/DL (ref 6.1–8)
WBC # BLD: 7.7 THOU/MM3 (ref 4.8–10.8)

## 2019-02-02 PROCEDURE — 93010 ELECTROCARDIOGRAM REPORT: CPT | Performed by: INTERNAL MEDICINE

## 2019-02-02 PROCEDURE — 36415 COLL VENOUS BLD VENIPUNCTURE: CPT

## 2019-02-02 PROCEDURE — 85025 COMPLETE CBC W/AUTO DIFF WBC: CPT

## 2019-02-02 PROCEDURE — 2580000003 HC RX 258: Performed by: INTERNAL MEDICINE

## 2019-02-02 PROCEDURE — 99238 HOSP IP/OBS DSCHRG MGMT 30/<: CPT | Performed by: HOSPITALIST

## 2019-02-02 PROCEDURE — 6370000000 HC RX 637 (ALT 250 FOR IP): Performed by: HOSPITALIST

## 2019-02-02 PROCEDURE — 84145 PROCALCITONIN (PCT): CPT

## 2019-02-02 PROCEDURE — 2709999900 HC NON-CHARGEABLE SUPPLY

## 2019-02-02 PROCEDURE — 80048 BASIC METABOLIC PNL TOTAL CA: CPT

## 2019-02-02 PROCEDURE — 93005 ELECTROCARDIOGRAM TRACING: CPT | Performed by: INTERNAL MEDICINE

## 2019-02-02 PROCEDURE — G0378 HOSPITAL OBSERVATION PER HR: HCPCS

## 2019-02-02 PROCEDURE — 80076 HEPATIC FUNCTION PANEL: CPT

## 2019-02-02 PROCEDURE — 6370000000 HC RX 637 (ALT 250 FOR IP): Performed by: INTERNAL MEDICINE

## 2019-02-02 PROCEDURE — 71046 X-RAY EXAM CHEST 2 VIEWS: CPT

## 2019-02-02 PROCEDURE — 82948 REAGENT STRIP/BLOOD GLUCOSE: CPT

## 2019-02-02 RX ORDER — LOPERAMIDE HYDROCHLORIDE 2 MG/1
2 CAPSULE ORAL ONCE
Status: COMPLETED | OUTPATIENT
Start: 2019-02-02 | End: 2019-02-02

## 2019-02-02 RX ORDER — LEVOFLOXACIN 750 MG/1
750 TABLET ORAL DAILY
Qty: 6 TABLET | Refills: 0 | Status: SHIPPED | OUTPATIENT
Start: 2019-02-02 | End: 2019-02-12

## 2019-02-02 RX ADMIN — Medication 6 UNITS: at 12:33

## 2019-02-02 RX ADMIN — INSULIN GLARGINE 55 UNITS: 100 INJECTION, SOLUTION SUBCUTANEOUS at 09:08

## 2019-02-02 RX ADMIN — Medication 4 UNITS: at 09:11

## 2019-02-02 RX ADMIN — FAMOTIDINE 20 MG: 20 TABLET ORAL at 08:23

## 2019-02-02 RX ADMIN — SERTRALINE 150 MG: 100 TABLET, FILM COATED ORAL at 08:24

## 2019-02-02 RX ADMIN — SODIUM CHLORIDE: 9 INJECTION, SOLUTION INTRAVENOUS at 00:43

## 2019-02-02 RX ADMIN — LOPERAMIDE HYDROCHLORIDE 2 MG: 2 CAPSULE ORAL at 13:08

## 2019-02-04 ENCOUNTER — TELEPHONE (OUTPATIENT)
Dept: FAMILY MEDICINE CLINIC | Age: 49
End: 2019-02-04

## 2019-02-07 LAB
BLOOD CULTURE, ROUTINE: NORMAL
BLOOD CULTURE, ROUTINE: NORMAL

## 2019-02-14 ENCOUNTER — NURSE TRIAGE (OUTPATIENT)
Dept: ADMINISTRATIVE | Age: 49
End: 2019-02-14

## 2019-02-14 ENCOUNTER — APPOINTMENT (OUTPATIENT)
Dept: GENERAL RADIOLOGY | Age: 49
DRG: 492 | End: 2019-02-14
Payer: MEDICARE

## 2019-02-14 ENCOUNTER — HOSPITAL ENCOUNTER (INPATIENT)
Age: 49
LOS: 5 days | Discharge: NURSING, REHAB OR LONG-TERM CARE | DRG: 492 | End: 2019-02-20
Attending: EMERGENCY MEDICINE | Admitting: INTERNAL MEDICINE
Payer: MEDICARE

## 2019-02-14 DIAGNOSIS — T87.40 AMPUTATION STUMP INFECTION (HCC): Primary | ICD-10-CM

## 2019-02-14 DIAGNOSIS — Z98.890 STATUS POST INCISION AND DRAINAGE: ICD-10-CM

## 2019-02-14 LAB
BASOPHILS # BLD: 0.4 %
BASOPHILS ABSOLUTE: 0.1 THOU/MM3 (ref 0–0.1)
EKG ATRIAL RATE: 104 BPM
EKG P AXIS: 29 DEGREES
EKG P-R INTERVAL: 152 MS
EKG Q-T INTERVAL: 368 MS
EKG QRS DURATION: 86 MS
EKG QTC CALCULATION (BAZETT): 483 MS
EKG R AXIS: 63 DEGREES
EKG T AXIS: 30 DEGREES
EKG VENTRICULAR RATE: 104 BPM
EOSINOPHIL # BLD: 1.2 %
EOSINOPHILS ABSOLUTE: 0.2 THOU/MM3 (ref 0–0.4)
ERYTHROCYTE [DISTWIDTH] IN BLOOD BY AUTOMATED COUNT: 13.8 % (ref 11.5–14.5)
ERYTHROCYTE [DISTWIDTH] IN BLOOD BY AUTOMATED COUNT: 43.2 FL (ref 35–45)
HCT VFR BLD CALC: 35 % (ref 42–52)
HEMOGLOBIN: 12 GM/DL (ref 14–18)
IMMATURE GRANS (ABS): 0.08 THOU/MM3 (ref 0–0.07)
IMMATURE GRANULOCYTES: 0.6 %
LACTIC ACID, SEPSIS: 1.8 MMOL/L (ref 0.5–1.9)
LYMPHOCYTES # BLD: 7.3 %
LYMPHOCYTES ABSOLUTE: 0.9 THOU/MM3 (ref 1–4.8)
MCH RBC QN AUTO: 29.1 PG (ref 26–33)
MCHC RBC AUTO-ENTMCNC: 34.3 GM/DL (ref 32.2–35.5)
MCV RBC AUTO: 85 FL (ref 80–94)
MONOCYTES # BLD: 10.2 %
MONOCYTES ABSOLUTE: 1.3 THOU/MM3 (ref 0.4–1.3)
NUCLEATED RED BLOOD CELLS: 0 /100 WBC
PLATELET # BLD: 293 THOU/MM3 (ref 130–400)
PMV BLD AUTO: 9.7 FL (ref 9.4–12.4)
RBC # BLD: 4.12 MILL/MM3 (ref 4.7–6.1)
SEG NEUTROPHILS: 80.3 %
SEGMENTED NEUTROPHILS ABSOLUTE COUNT: 10 THOU/MM3 (ref 1.8–7.7)
WBC # BLD: 12.5 THOU/MM3 (ref 4.8–10.8)

## 2019-02-14 PROCEDURE — 85651 RBC SED RATE NONAUTOMATED: CPT

## 2019-02-14 PROCEDURE — 2580000003 HC RX 258: Performed by: EMERGENCY MEDICINE

## 2019-02-14 PROCEDURE — 96375 TX/PRO/DX INJ NEW DRUG ADDON: CPT

## 2019-02-14 PROCEDURE — 93005 ELECTROCARDIOGRAM TRACING: CPT | Performed by: EMERGENCY MEDICINE

## 2019-02-14 PROCEDURE — 99284 EMERGENCY DEPT VISIT MOD MDM: CPT

## 2019-02-14 PROCEDURE — 80048 BASIC METABOLIC PNL TOTAL CA: CPT

## 2019-02-14 PROCEDURE — 86140 C-REACTIVE PROTEIN: CPT

## 2019-02-14 PROCEDURE — 0QBJ0ZZ EXCISION OF RIGHT FIBULA, OPEN APPROACH: ICD-10-PCS | Performed by: ORTHOPAEDIC SURGERY

## 2019-02-14 PROCEDURE — 87077 CULTURE AEROBIC IDENTIFY: CPT

## 2019-02-14 PROCEDURE — 73590 X-RAY EXAM OF LOWER LEG: CPT

## 2019-02-14 PROCEDURE — 36415 COLL VENOUS BLD VENIPUNCTURE: CPT

## 2019-02-14 PROCEDURE — 87801 DETECT AGNT MULT DNA AMPLI: CPT

## 2019-02-14 PROCEDURE — 87040 BLOOD CULTURE FOR BACTERIA: CPT

## 2019-02-14 PROCEDURE — 6360000002 HC RX W HCPCS: Performed by: EMERGENCY MEDICINE

## 2019-02-14 PROCEDURE — 87186 SC STD MICRODIL/AGAR DIL: CPT

## 2019-02-14 PROCEDURE — 85025 COMPLETE CBC W/AUTO DIFF WBC: CPT

## 2019-02-14 PROCEDURE — 83605 ASSAY OF LACTIC ACID: CPT

## 2019-02-14 PROCEDURE — 96365 THER/PROPH/DIAG IV INF INIT: CPT

## 2019-02-14 RX ORDER — 0.9 % SODIUM CHLORIDE 0.9 %
30 INTRAVENOUS SOLUTION INTRAVENOUS ONCE
Status: COMPLETED | OUTPATIENT
Start: 2019-02-14 | End: 2019-02-15

## 2019-02-14 RX ORDER — SODIUM CHLORIDE 9 MG/ML
INJECTION, SOLUTION INTRAVENOUS CONTINUOUS
Status: DISCONTINUED | OUTPATIENT
Start: 2019-02-14 | End: 2019-02-15 | Stop reason: DRUGHIGH

## 2019-02-14 RX ORDER — ONDANSETRON 2 MG/ML
4 INJECTION INTRAMUSCULAR; INTRAVENOUS EVERY 30 MIN PRN
Status: DISCONTINUED | OUTPATIENT
Start: 2019-02-14 | End: 2019-02-15 | Stop reason: ALTCHOICE

## 2019-02-14 RX ORDER — FENTANYL CITRATE 50 UG/ML
50 INJECTION, SOLUTION INTRAMUSCULAR; INTRAVENOUS
Status: DISCONTINUED | OUTPATIENT
Start: 2019-02-14 | End: 2019-02-15 | Stop reason: ALTCHOICE

## 2019-02-14 RX ADMIN — ONDANSETRON 4 MG: 2 INJECTION INTRAMUSCULAR; INTRAVENOUS at 23:49

## 2019-02-14 RX ADMIN — FENTANYL CITRATE 50 MCG: 50 INJECTION, SOLUTION INTRAMUSCULAR; INTRAVENOUS at 23:49

## 2019-02-14 RX ADMIN — VANCOMYCIN HYDROCHLORIDE 1500 MG: 1 INJECTION, POWDER, LYOPHILIZED, FOR SOLUTION INTRAVENOUS at 23:49

## 2019-02-14 RX ADMIN — SODIUM CHLORIDE 1914 ML: 9 INJECTION, SOLUTION INTRAVENOUS at 23:49

## 2019-02-14 ASSESSMENT — ENCOUNTER SYMPTOMS
NAUSEA: 0
COUGH: 0
BACK PAIN: 0
WHEEZING: 0
VOMITING: 0
ABDOMINAL PAIN: 1
SORE THROAT: 0
SHORTNESS OF BREATH: 1
BLOOD IN STOOL: 0
DIARRHEA: 0

## 2019-02-14 ASSESSMENT — PAIN SCALES - GENERAL
PAINLEVEL_OUTOF10: 10
PAINLEVEL_OUTOF10: 10

## 2019-02-14 ASSESSMENT — PAIN DESCRIPTION - LOCATION: LOCATION: LEG

## 2019-02-14 ASSESSMENT — PAIN DESCRIPTION - ORIENTATION: ORIENTATION: RIGHT

## 2019-02-14 ASSESSMENT — PAIN DESCRIPTION - PAIN TYPE: TYPE: ACUTE PAIN

## 2019-02-15 ENCOUNTER — APPOINTMENT (OUTPATIENT)
Dept: CT IMAGING | Age: 49
DRG: 492 | End: 2019-02-15
Payer: MEDICARE

## 2019-02-15 ENCOUNTER — APPOINTMENT (OUTPATIENT)
Dept: GENERAL RADIOLOGY | Age: 49
DRG: 492 | End: 2019-02-15
Payer: MEDICARE

## 2019-02-15 PROBLEM — L03.115 CELLULITIS OF RIGHT LOWER EXTREMITY: Status: ACTIVE | Noted: 2019-02-15

## 2019-02-15 PROBLEM — T87.40 AMPUTATION STUMP INFECTION (HCC): Status: ACTIVE | Noted: 2019-02-15

## 2019-02-15 PROBLEM — E11.59 TYPE 2 DIABETES MELLITUS WITH CIRCULATORY DISORDER, WITH LONG-TERM CURRENT USE OF INSULIN (HCC): Status: ACTIVE | Noted: 2019-02-15

## 2019-02-15 PROBLEM — E87.8 ELECTROLYTE IMBALANCE: Status: ACTIVE | Noted: 2019-02-15

## 2019-02-15 PROBLEM — Z79.4 TYPE 2 DIABETES MELLITUS WITH CIRCULATORY DISORDER, WITH LONG-TERM CURRENT USE OF INSULIN (HCC): Status: ACTIVE | Noted: 2019-02-15

## 2019-02-15 PROBLEM — D64.9 ANEMIA: Status: ACTIVE | Noted: 2019-02-15

## 2019-02-15 LAB
ANION GAP SERPL CALCULATED.3IONS-SCNC: 16 MEQ/L (ref 8–16)
BACTERIA: ABNORMAL /HPF
BILIRUBIN URINE: NEGATIVE
BLOOD, URINE: ABNORMAL
BUN BLDV-MCNC: 12 MG/DL (ref 7–22)
C-REACTIVE PROTEIN: 27.15 MG/DL (ref 0–1)
CALCIUM SERPL-MCNC: 8.4 MG/DL (ref 8.5–10.5)
CASTS 2: ABNORMAL /LPF
CASTS UA: ABNORMAL /LPF
CHARACTER, URINE: CLEAR
CHLORIDE BLD-SCNC: 94 MEQ/L (ref 98–111)
CO2: 20 MEQ/L (ref 23–33)
COLOR: YELLOW
CREAT SERPL-MCNC: 0.7 MG/DL (ref 0.4–1.2)
CRYSTALS, UA: ABNORMAL
EPITHELIAL CELLS, UA: ABNORMAL /HPF
GFR SERPL CREATININE-BSD FRML MDRD: > 90 ML/MIN/1.73M2
GLUCOSE BLD-MCNC: 176 MG/DL (ref 70–108)
GLUCOSE BLD-MCNC: 288 MG/DL (ref 70–108)
GLUCOSE BLD-MCNC: 289 MG/DL (ref 70–108)
GLUCOSE BLD-MCNC: 308 MG/DL (ref 70–108)
GLUCOSE BLD-MCNC: 317 MG/DL (ref 70–108)
GLUCOSE BLD-MCNC: 327 MG/DL (ref 70–108)
GLUCOSE URINE: >= 1000 MG/DL
KETONES, URINE: 80
LACTIC ACID, SEPSIS: 1.3 MMOL/L (ref 0.5–1.9)
LEUKOCYTE ESTERASE, URINE: NEGATIVE
MISCELLANEOUS 2: ABNORMAL
NITRITE, URINE: NEGATIVE
OSMOLALITY CALCULATION: 273.3 MOSMOL/KG (ref 275–300)
PH UA: 5.5
POTASSIUM REFLEX MAGNESIUM: 4.2 MEQ/L (ref 3.5–5.2)
PROTEIN UA: ABNORMAL
RBC URINE: ABNORMAL /HPF
RENAL EPITHELIAL, UA: ABNORMAL
SEDIMENTATION RATE, ERYTHROCYTE: 88 MM/HR (ref 0–10)
SODIUM BLD-SCNC: 130 MEQ/L (ref 135–145)
SPECIFIC GRAVITY, URINE: > 1.03 (ref 1–1.03)
UROBILINOGEN, URINE: 1 EU/DL
WBC UA: ABNORMAL /HPF
YEAST: ABNORMAL

## 2019-02-15 PROCEDURE — 2580000003 HC RX 258: Performed by: INTERNAL MEDICINE

## 2019-02-15 PROCEDURE — 87205 SMEAR GRAM STAIN: CPT

## 2019-02-15 PROCEDURE — 71045 X-RAY EXAM CHEST 1 VIEW: CPT

## 2019-02-15 PROCEDURE — 99223 1ST HOSP IP/OBS HIGH 75: CPT | Performed by: INTERNAL MEDICINE

## 2019-02-15 PROCEDURE — 93010 ELECTROCARDIOGRAM REPORT: CPT | Performed by: INTERNAL MEDICINE

## 2019-02-15 PROCEDURE — 2580000003 HC RX 258: Performed by: EMERGENCY MEDICINE

## 2019-02-15 PROCEDURE — 6360000002 HC RX W HCPCS: Performed by: INTERNAL MEDICINE

## 2019-02-15 PROCEDURE — 81001 URINALYSIS AUTO W/SCOPE: CPT

## 2019-02-15 PROCEDURE — 83605 ASSAY OF LACTIC ACID: CPT

## 2019-02-15 PROCEDURE — 6370000000 HC RX 637 (ALT 250 FOR IP): Performed by: HOSPITALIST

## 2019-02-15 PROCEDURE — 6370000000 HC RX 637 (ALT 250 FOR IP): Performed by: INTERNAL MEDICINE

## 2019-02-15 PROCEDURE — 6360000004 HC RX CONTRAST MEDICATION: Performed by: INTERNAL MEDICINE

## 2019-02-15 PROCEDURE — 87077 CULTURE AEROBIC IDENTIFY: CPT

## 2019-02-15 PROCEDURE — 87186 SC STD MICRODIL/AGAR DIL: CPT

## 2019-02-15 PROCEDURE — 6360000002 HC RX W HCPCS: Performed by: EMERGENCY MEDICINE

## 2019-02-15 PROCEDURE — 73701 CT LOWER EXTREMITY W/DYE: CPT

## 2019-02-15 PROCEDURE — 82948 REAGENT STRIP/BLOOD GLUCOSE: CPT

## 2019-02-15 PROCEDURE — 94760 N-INVAS EAR/PLS OXIMETRY 1: CPT

## 2019-02-15 PROCEDURE — 1200000003 HC TELEMETRY R&B

## 2019-02-15 PROCEDURE — 87070 CULTURE OTHR SPECIMN AEROBIC: CPT

## 2019-02-15 PROCEDURE — 99233 SBSQ HOSP IP/OBS HIGH 50: CPT | Performed by: HOSPITALIST

## 2019-02-15 PROCEDURE — 94640 AIRWAY INHALATION TREATMENT: CPT

## 2019-02-15 PROCEDURE — 97530 THERAPEUTIC ACTIVITIES: CPT

## 2019-02-15 PROCEDURE — 36415 COLL VENOUS BLD VENIPUNCTURE: CPT

## 2019-02-15 PROCEDURE — 97162 PT EVAL MOD COMPLEX 30 MIN: CPT

## 2019-02-15 RX ORDER — SODIUM CHLORIDE 0.9 % (FLUSH) 0.9 %
10 SYRINGE (ML) INJECTION EVERY 12 HOURS SCHEDULED
Status: DISCONTINUED | OUTPATIENT
Start: 2019-02-15 | End: 2019-02-20 | Stop reason: HOSPADM

## 2019-02-15 RX ORDER — INSULIN GLARGINE 100 [IU]/ML
55 INJECTION, SOLUTION SUBCUTANEOUS 2 TIMES DAILY
Status: DISCONTINUED | OUTPATIENT
Start: 2019-02-15 | End: 2019-02-15

## 2019-02-15 RX ORDER — GUAIFENESIN 600 MG/1
600 TABLET, EXTENDED RELEASE ORAL 2 TIMES DAILY
Status: DISCONTINUED | OUTPATIENT
Start: 2019-02-15 | End: 2019-02-20 | Stop reason: HOSPADM

## 2019-02-15 RX ORDER — ONDANSETRON 2 MG/ML
4 INJECTION INTRAMUSCULAR; INTRAVENOUS EVERY 6 HOURS PRN
Status: DISCONTINUED | OUTPATIENT
Start: 2019-02-15 | End: 2019-02-20 | Stop reason: HOSPADM

## 2019-02-15 RX ORDER — GUAIFENESIN/DEXTROMETHORPHAN 100-10MG/5
5 SYRUP ORAL EVERY 4 HOURS PRN
Status: DISCONTINUED | OUTPATIENT
Start: 2019-02-15 | End: 2019-02-20 | Stop reason: HOSPADM

## 2019-02-15 RX ORDER — ACETAMINOPHEN 325 MG/1
650 TABLET ORAL EVERY 4 HOURS PRN
Status: DISCONTINUED | OUTPATIENT
Start: 2019-02-15 | End: 2019-02-16

## 2019-02-15 RX ORDER — HYDROCODONE BITARTRATE AND ACETAMINOPHEN 7.5; 325 MG/1; MG/1
1 TABLET ORAL EVERY 6 HOURS PRN
Status: DISCONTINUED | OUTPATIENT
Start: 2019-02-15 | End: 2019-02-16

## 2019-02-15 RX ORDER — SODIUM CHLORIDE 9 MG/ML
INJECTION, SOLUTION INTRAVENOUS CONTINUOUS
Status: DISCONTINUED | OUTPATIENT
Start: 2019-02-15 | End: 2019-02-16

## 2019-02-15 RX ORDER — INSULIN GLARGINE 100 [IU]/ML
60 INJECTION, SOLUTION SUBCUTANEOUS NIGHTLY
Status: DISCONTINUED | OUTPATIENT
Start: 2019-02-16 | End: 2019-02-15

## 2019-02-15 RX ORDER — NICOTINE POLACRILEX 4 MG
15 LOZENGE BUCCAL PRN
Status: DISCONTINUED | OUTPATIENT
Start: 2019-02-15 | End: 2019-02-20 | Stop reason: HOSPADM

## 2019-02-15 RX ORDER — LISINOPRIL 10 MG/1
10 TABLET ORAL DAILY
Status: DISCONTINUED | OUTPATIENT
Start: 2019-02-15 | End: 2019-02-20 | Stop reason: HOSPADM

## 2019-02-15 RX ORDER — INSULIN GLARGINE 100 [IU]/ML
75 INJECTION, SOLUTION SUBCUTANEOUS NIGHTLY
Status: DISCONTINUED | OUTPATIENT
Start: 2019-02-16 | End: 2019-02-20 | Stop reason: HOSPADM

## 2019-02-15 RX ORDER — IPRATROPIUM BROMIDE AND ALBUTEROL SULFATE 2.5; .5 MG/3ML; MG/3ML
1 SOLUTION RESPIRATORY (INHALATION)
Status: DISCONTINUED | OUTPATIENT
Start: 2019-02-15 | End: 2019-02-20

## 2019-02-15 RX ORDER — DEXTROSE MONOHYDRATE 50 MG/ML
100 INJECTION, SOLUTION INTRAVENOUS PRN
Status: DISCONTINUED | OUTPATIENT
Start: 2019-02-15 | End: 2019-02-20 | Stop reason: HOSPADM

## 2019-02-15 RX ORDER — SODIUM CHLORIDE 0.9 % (FLUSH) 0.9 %
10 SYRINGE (ML) INJECTION PRN
Status: DISCONTINUED | OUTPATIENT
Start: 2019-02-15 | End: 2019-02-20 | Stop reason: HOSPADM

## 2019-02-15 RX ORDER — DEXTROSE MONOHYDRATE 25 G/50ML
12.5 INJECTION, SOLUTION INTRAVENOUS PRN
Status: DISCONTINUED | OUTPATIENT
Start: 2019-02-15 | End: 2019-02-20 | Stop reason: HOSPADM

## 2019-02-15 RX ORDER — MIRTAZAPINE 15 MG/1
15 TABLET, FILM COATED ORAL NIGHTLY
Status: DISCONTINUED | OUTPATIENT
Start: 2019-02-15 | End: 2019-02-20 | Stop reason: HOSPADM

## 2019-02-15 RX ADMIN — GUAIFENESIN AND DEXTROMETHORPHAN 5 ML: 100; 10 SYRUP ORAL at 08:53

## 2019-02-15 RX ADMIN — HYDROCODONE BITARTRATE AND ACETAMINOPHEN 1 TABLET: 7.5; 325 TABLET ORAL at 22:36

## 2019-02-15 RX ADMIN — Medication 3 UNITS: at 12:47

## 2019-02-15 RX ADMIN — INSULIN GLARGINE 55 UNITS: 100 INJECTION, SOLUTION SUBCUTANEOUS at 08:48

## 2019-02-15 RX ADMIN — CEFEPIME HYDROCHLORIDE 1 G: 1 INJECTION, POWDER, FOR SOLUTION INTRAMUSCULAR; INTRAVENOUS at 16:36

## 2019-02-15 RX ADMIN — CEFEPIME HYDROCHLORIDE 2 G: 2 INJECTION, POWDER, FOR SOLUTION INTRAVENOUS at 01:57

## 2019-02-15 RX ADMIN — Medication 3 UNITS: at 08:49

## 2019-02-15 RX ADMIN — GUAIFENESIN 600 MG: 600 TABLET, EXTENDED RELEASE ORAL at 03:06

## 2019-02-15 RX ADMIN — ENOXAPARIN SODIUM 40 MG: 40 INJECTION SUBCUTANEOUS at 08:53

## 2019-02-15 RX ADMIN — GUAIFENESIN AND DEXTROMETHORPHAN 5 ML: 100; 10 SYRUP ORAL at 03:19

## 2019-02-15 RX ADMIN — Medication 10 ML: at 20:26

## 2019-02-15 RX ADMIN — INSULIN LISPRO 8 UNITS: 100 INJECTION, SOLUTION INTRAVENOUS; SUBCUTANEOUS at 16:45

## 2019-02-15 RX ADMIN — SODIUM CHLORIDE: 9 INJECTION, SOLUTION INTRAVENOUS at 11:46

## 2019-02-15 RX ADMIN — INSULIN LISPRO 1 UNITS: 100 INJECTION, SOLUTION INTRAVENOUS; SUBCUTANEOUS at 20:26

## 2019-02-15 RX ADMIN — INSULIN LISPRO 25 UNITS: 100 INJECTION, SOLUTION INTRAVENOUS; SUBCUTANEOUS at 16:45

## 2019-02-15 RX ADMIN — LISINOPRIL 10 MG: 10 TABLET ORAL at 16:39

## 2019-02-15 RX ADMIN — IPRATROPIUM BROMIDE AND ALBUTEROL SULFATE 1 AMPULE: .5; 3 SOLUTION RESPIRATORY (INHALATION) at 16:02

## 2019-02-15 RX ADMIN — IPRATROPIUM BROMIDE AND ALBUTEROL SULFATE 1 AMPULE: .5; 3 SOLUTION RESPIRATORY (INHALATION) at 20:13

## 2019-02-15 RX ADMIN — IPRATROPIUM BROMIDE AND ALBUTEROL SULFATE 1 AMPULE: .5; 3 SOLUTION RESPIRATORY (INHALATION) at 11:18

## 2019-02-15 RX ADMIN — VANCOMYCIN HYDROCHLORIDE 1500 MG: 5 INJECTION, POWDER, LYOPHILIZED, FOR SOLUTION INTRAVENOUS at 11:46

## 2019-02-15 RX ADMIN — INSULIN GLARGINE 55 UNITS: 100 INJECTION, SOLUTION SUBCUTANEOUS at 02:29

## 2019-02-15 RX ADMIN — SERTRALINE 150 MG: 100 TABLET, FILM COATED ORAL at 08:54

## 2019-02-15 RX ADMIN — IOPAMIDOL 80 ML: 755 INJECTION, SOLUTION INTRAVENOUS at 12:38

## 2019-02-15 RX ADMIN — HYDROCODONE BITARTRATE AND ACETAMINOPHEN 1 TABLET: 7.5; 325 TABLET ORAL at 16:36

## 2019-02-15 RX ADMIN — ONDANSETRON 4 MG: 2 INJECTION INTRAMUSCULAR; INTRAVENOUS at 11:50

## 2019-02-15 RX ADMIN — ACETAMINOPHEN 650 MG: 325 TABLET ORAL at 03:06

## 2019-02-15 RX ADMIN — IPRATROPIUM BROMIDE AND ALBUTEROL SULFATE 1 AMPULE: .5; 3 SOLUTION RESPIRATORY (INHALATION) at 07:28

## 2019-02-15 RX ADMIN — GUAIFENESIN 600 MG: 600 TABLET, EXTENDED RELEASE ORAL at 08:53

## 2019-02-15 RX ADMIN — MIRTAZAPINE 15 MG: 15 TABLET, FILM COATED ORAL at 20:25

## 2019-02-15 RX ADMIN — GUAIFENESIN 600 MG: 600 TABLET, EXTENDED RELEASE ORAL at 20:25

## 2019-02-15 RX ADMIN — HYDROCODONE BITARTRATE AND ACETAMINOPHEN 1 TABLET: 7.5; 325 TABLET ORAL at 08:53

## 2019-02-15 RX ADMIN — SODIUM CHLORIDE: 9 INJECTION, SOLUTION INTRAVENOUS at 02:02

## 2019-02-15 ASSESSMENT — PAIN SCALES - GENERAL
PAINLEVEL_OUTOF10: 8
PAINLEVEL_OUTOF10: 4
PAINLEVEL_OUTOF10: 8
PAINLEVEL_OUTOF10: 8
PAINLEVEL_OUTOF10: 10
PAINLEVEL_OUTOF10: 5
PAINLEVEL_OUTOF10: 7
PAINLEVEL_OUTOF10: 8
PAINLEVEL_OUTOF10: 9
PAINLEVEL_OUTOF10: 10

## 2019-02-15 ASSESSMENT — PAIN DESCRIPTION - ONSET
ONSET: ON-GOING

## 2019-02-15 ASSESSMENT — PAIN DESCRIPTION - ORIENTATION
ORIENTATION: RIGHT;LOWER
ORIENTATION: LOWER
ORIENTATION: LOWER
ORIENTATION: RIGHT;LEFT;LOWER

## 2019-02-15 ASSESSMENT — PAIN DESCRIPTION - LOCATION
LOCATION: ABDOMEN

## 2019-02-15 ASSESSMENT — PAIN DESCRIPTION - PROGRESSION
CLINICAL_PROGRESSION: NOT CHANGED

## 2019-02-15 ASSESSMENT — PAIN DESCRIPTION - DESCRIPTORS
DESCRIPTORS: ACHING
DESCRIPTORS: SHARP
DESCRIPTORS: SHARP
DESCRIPTORS: ACHING

## 2019-02-15 ASSESSMENT — PAIN DESCRIPTION - PAIN TYPE
TYPE: ACUTE PAIN

## 2019-02-15 ASSESSMENT — PAIN - FUNCTIONAL ASSESSMENT
PAIN_FUNCTIONAL_ASSESSMENT: PREVENTS OR INTERFERES SOME ACTIVE ACTIVITIES AND ADLS

## 2019-02-15 ASSESSMENT — PAIN DESCRIPTION - FREQUENCY
FREQUENCY: CONTINUOUS

## 2019-02-16 ENCOUNTER — APPOINTMENT (OUTPATIENT)
Dept: CT IMAGING | Age: 49
DRG: 492 | End: 2019-02-16
Payer: MEDICARE

## 2019-02-16 LAB
ACINETOBACTER BAUMANNII FILM ARRAY: NOT DETECTED
ALBUMIN SERPL-MCNC: 2.5 G/DL (ref 3.5–5.1)
ANION GAP SERPL CALCULATED.3IONS-SCNC: 15 MEQ/L (ref 8–16)
BOTTLE TYPE: ABNORMAL
BUN BLDV-MCNC: 9 MG/DL (ref 7–22)
CALCIUM IONIZED: 1.07 MMOL/L (ref 1.12–1.32)
CALCIUM SERPL-MCNC: 7.9 MG/DL (ref 8.5–10.5)
CANDIDA ALBICANS FILM ARRAY: NOT DETECTED
CANDIDA GLABRATA FILM ARRAY: NOT DETECTED
CANDIDA KRUSEI FILM ARRAY: NOT DETECTED
CANDIDA PARAPSILOSIS FILM ARRAY: NOT DETECTED
CANDIDA TROPICALIS FILM ARRAY: NOT DETECTED
CARBAPENEM RESITANT FILM ARRAY: ABNORMAL
CHLORIDE BLD-SCNC: 94 MEQ/L (ref 98–111)
CO2: 20 MEQ/L (ref 23–33)
CREAT SERPL-MCNC: 0.7 MG/DL (ref 0.4–1.2)
CREATININE URINE: 81.5 MG/DL
ENTERBACTER CLOACAE FILM ARRAY: NOT DETECTED
ENTERBACTERIACEAE FILM ARRAY: NOT DETECTED
ENTEROCOCCUS FILM ARRAY: NOT DETECTED
ERYTHROCYTE [DISTWIDTH] IN BLOOD BY AUTOMATED COUNT: 14.2 % (ref 11.5–14.5)
ERYTHROCYTE [DISTWIDTH] IN BLOOD BY AUTOMATED COUNT: 43.5 FL (ref 35–45)
ESCHERICHIA COLI FILM ARRAY: NOT DETECTED
GFR SERPL CREATININE-BSD FRML MDRD: > 90 ML/MIN/1.73M2
GLUCOSE BLD-MCNC: 114 MG/DL (ref 70–108)
GLUCOSE BLD-MCNC: 131 MG/DL (ref 70–108)
GLUCOSE BLD-MCNC: 185 MG/DL (ref 70–108)
GLUCOSE BLD-MCNC: 249 MG/DL (ref 70–108)
GLUCOSE BLD-MCNC: 286 MG/DL (ref 70–108)
HAEMOPHILUS INFLUENZA FILM ARRAY: NOT DETECTED
HCT VFR BLD CALC: 31.1 % (ref 42–52)
HEMOGLOBIN: 10.5 GM/DL (ref 14–18)
KLEBSIELLA OXYTOCA FILM ARRAY: NOT DETECTED
KLEBSIELLA PNEUMONIAE FILM ARRAY: NOT DETECTED
LISTERIA MONOCYTOGENES FILM ARRAY: NOT DETECTED
MCH RBC QN AUTO: 28.5 PG (ref 26–33)
MCHC RBC AUTO-ENTMCNC: 33.8 GM/DL (ref 32.2–35.5)
MCV RBC AUTO: 84.3 FL (ref 80–94)
METHICILLIN RESISTANT FILM ARRAY: ABNORMAL
NEISSERIA MENIGITIDIS FILM ARRAY: NOT DETECTED
OSMOLALITY URINE: 442 MOSMOL/KG (ref 250–750)
OSMOLALITY: 277 MOSMOL/KG (ref 275–295)
PHOSPHORUS: 2.6 MG/DL (ref 2.4–4.7)
PLATELET # BLD: 267 THOU/MM3 (ref 130–400)
PMV BLD AUTO: 9.5 FL (ref 9.4–12.4)
POTASSIUM SERPL-SCNC: 3.4 MEQ/L (ref 3.5–5.2)
PROTEUS FILM ARRAY: NOT DETECTED
PSEUDOMONAS AERUGINOSA FILM ARRAY: NOT DETECTED
RBC # BLD: 3.69 MILL/MM3 (ref 4.7–6.1)
SERRATIA MARCESCENS FILM ARRAY: NOT DETECTED
SODIUM BLD-SCNC: 129 MEQ/L (ref 135–145)
SODIUM URINE: 36 MEQ/L
SOURCE OF BLOOD CULTURE: ABNORMAL
STAPH AUREUS FILM ARRAY: NOT DETECTED
STAPHYLOCOCCUS FILM ARRAY: NOT DETECTED
STREP AGALACTIAE FILM ARRAY: DETECTED
STREP PNEUMONIAE FILM ARRAY: NOT DETECTED
STREP PYOCGENES FILM ARRAY: NOT DETECTED
STREPTOCOCCUS FILM ARRAY: DETECTED
VANCOMYCIN RESISTANT FILM ARRAY: ABNORMAL
WBC # BLD: 11.4 THOU/MM3 (ref 4.8–10.8)

## 2019-02-16 PROCEDURE — 83930 ASSAY OF BLOOD OSMOLALITY: CPT

## 2019-02-16 PROCEDURE — 99233 SBSQ HOSP IP/OBS HIGH 50: CPT | Performed by: HOSPITALIST

## 2019-02-16 PROCEDURE — 6370000000 HC RX 637 (ALT 250 FOR IP): Performed by: INTERNAL MEDICINE

## 2019-02-16 PROCEDURE — 94640 AIRWAY INHALATION TREATMENT: CPT

## 2019-02-16 PROCEDURE — 82570 ASSAY OF URINE CREATININE: CPT

## 2019-02-16 PROCEDURE — 84300 ASSAY OF URINE SODIUM: CPT

## 2019-02-16 PROCEDURE — 36415 COLL VENOUS BLD VENIPUNCTURE: CPT

## 2019-02-16 PROCEDURE — 85027 COMPLETE CBC AUTOMATED: CPT

## 2019-02-16 PROCEDURE — 2580000003 HC RX 258: Performed by: INTERNAL MEDICINE

## 2019-02-16 PROCEDURE — 97165 OT EVAL LOW COMPLEX 30 MIN: CPT

## 2019-02-16 PROCEDURE — 74177 CT ABD & PELVIS W/CONTRAST: CPT

## 2019-02-16 PROCEDURE — 6360000004 HC RX CONTRAST MEDICATION: Performed by: HOSPITALIST

## 2019-02-16 PROCEDURE — 82948 REAGENT STRIP/BLOOD GLUCOSE: CPT

## 2019-02-16 PROCEDURE — 6370000000 HC RX 637 (ALT 250 FOR IP): Performed by: HOSPITALIST

## 2019-02-16 PROCEDURE — 82330 ASSAY OF CALCIUM: CPT

## 2019-02-16 PROCEDURE — 80069 RENAL FUNCTION PANEL: CPT

## 2019-02-16 PROCEDURE — 6360000002 HC RX W HCPCS: Performed by: INTERNAL MEDICINE

## 2019-02-16 PROCEDURE — 1200000003 HC TELEMETRY R&B

## 2019-02-16 PROCEDURE — 2580000003 HC RX 258: Performed by: HOSPITALIST

## 2019-02-16 PROCEDURE — 83935 ASSAY OF URINE OSMOLALITY: CPT

## 2019-02-16 RX ORDER — ACETAMINOPHEN 325 MG/1
650 TABLET ORAL EVERY 6 HOURS
Status: DISCONTINUED | OUTPATIENT
Start: 2019-02-16 | End: 2019-02-20 | Stop reason: HOSPADM

## 2019-02-16 RX ORDER — OXYCODONE HYDROCHLORIDE 5 MG/1
10 TABLET ORAL EVERY 6 HOURS PRN
Status: DISCONTINUED | OUTPATIENT
Start: 2019-02-16 | End: 2019-02-20 | Stop reason: HOSPADM

## 2019-02-16 RX ORDER — SODIUM CHLORIDE 9 MG/ML
INJECTION, SOLUTION INTRAVENOUS CONTINUOUS
Status: DISCONTINUED | OUTPATIENT
Start: 2019-02-16 | End: 2019-02-18

## 2019-02-16 RX ADMIN — ACETAMINOPHEN 650 MG: 325 TABLET ORAL at 20:53

## 2019-02-16 RX ADMIN — INSULIN LISPRO 25 UNITS: 100 INJECTION, SOLUTION INTRAVENOUS; SUBCUTANEOUS at 17:36

## 2019-02-16 RX ADMIN — IPRATROPIUM BROMIDE AND ALBUTEROL SULFATE 1 AMPULE: .5; 3 SOLUTION RESPIRATORY (INHALATION) at 08:01

## 2019-02-16 RX ADMIN — INSULIN GLARGINE 75 UNITS: 100 INJECTION, SOLUTION SUBCUTANEOUS at 20:55

## 2019-02-16 RX ADMIN — SERTRALINE 150 MG: 100 TABLET, FILM COATED ORAL at 09:58

## 2019-02-16 RX ADMIN — IPRATROPIUM BROMIDE AND ALBUTEROL SULFATE 1 AMPULE: .5; 3 SOLUTION RESPIRATORY (INHALATION) at 12:07

## 2019-02-16 RX ADMIN — LISINOPRIL 10 MG: 10 TABLET ORAL at 09:58

## 2019-02-16 RX ADMIN — IOPAMIDOL 80 ML: 755 INJECTION, SOLUTION INTRAVENOUS at 09:12

## 2019-02-16 RX ADMIN — IPRATROPIUM BROMIDE AND ALBUTEROL SULFATE 1 AMPULE: .5; 3 SOLUTION RESPIRATORY (INHALATION) at 20:31

## 2019-02-16 RX ADMIN — SODIUM CHLORIDE: 9 INJECTION, SOLUTION INTRAVENOUS at 03:00

## 2019-02-16 RX ADMIN — SODIUM CHLORIDE: 9 INJECTION, SOLUTION INTRAVENOUS at 23:10

## 2019-02-16 RX ADMIN — GUAIFENESIN 600 MG: 600 TABLET, EXTENDED RELEASE ORAL at 09:58

## 2019-02-16 RX ADMIN — INSULIN LISPRO 6 UNITS: 100 INJECTION, SOLUTION INTRAVENOUS; SUBCUTANEOUS at 17:35

## 2019-02-16 RX ADMIN — CEFTRIAXONE SODIUM 2 G: 2 INJECTION, POWDER, FOR SOLUTION INTRAMUSCULAR; INTRAVENOUS at 10:07

## 2019-02-16 RX ADMIN — HYDROCODONE BITARTRATE AND ACETAMINOPHEN 1 TABLET: 7.5; 325 TABLET ORAL at 05:37

## 2019-02-16 RX ADMIN — OXYCODONE HYDROCHLORIDE 10 MG: 5 TABLET ORAL at 15:52

## 2019-02-16 RX ADMIN — CEFEPIME HYDROCHLORIDE 1 G: 1 INJECTION, POWDER, FOR SOLUTION INTRAMUSCULAR; INTRAVENOUS at 02:00

## 2019-02-16 RX ADMIN — HYDROCODONE BITARTRATE AND ACETAMINOPHEN 1 TABLET: 7.5; 325 TABLET ORAL at 11:38

## 2019-02-16 RX ADMIN — GUAIFENESIN AND DEXTROMETHORPHAN 5 ML: 100; 10 SYRUP ORAL at 09:58

## 2019-02-16 RX ADMIN — GUAIFENESIN 600 MG: 600 TABLET, EXTENDED RELEASE ORAL at 20:53

## 2019-02-16 RX ADMIN — ACETAMINOPHEN 650 MG: 325 TABLET ORAL at 15:53

## 2019-02-16 RX ADMIN — IPRATROPIUM BROMIDE AND ALBUTEROL SULFATE 1 AMPULE: .5; 3 SOLUTION RESPIRATORY (INHALATION) at 16:25

## 2019-02-16 RX ADMIN — VANCOMYCIN HYDROCHLORIDE 1500 MG: 5 INJECTION, POWDER, LYOPHILIZED, FOR SOLUTION INTRAVENOUS at 00:11

## 2019-02-16 RX ADMIN — MIRTAZAPINE 15 MG: 15 TABLET, FILM COATED ORAL at 20:53

## 2019-02-16 RX ADMIN — INSULIN LISPRO 2 UNITS: 100 INJECTION, SOLUTION INTRAVENOUS; SUBCUTANEOUS at 20:55

## 2019-02-16 RX ADMIN — Medication 10 ML: at 21:15

## 2019-02-16 RX ADMIN — ENOXAPARIN SODIUM 40 MG: 40 INJECTION SUBCUTANEOUS at 09:58

## 2019-02-16 ASSESSMENT — PAIN SCALES - GENERAL
PAINLEVEL_OUTOF10: 7
PAINLEVEL_OUTOF10: 8
PAINLEVEL_OUTOF10: 8
PAINLEVEL_OUTOF10: 6
PAINLEVEL_OUTOF10: 5
PAINLEVEL_OUTOF10: 9
PAINLEVEL_OUTOF10: 6
PAINLEVEL_OUTOF10: 6
PAINLEVEL_OUTOF10: 5
PAINLEVEL_OUTOF10: 8
PAINLEVEL_OUTOF10: 9

## 2019-02-16 ASSESSMENT — PAIN DESCRIPTION - DESCRIPTORS
DESCRIPTORS: ACHING;DISCOMFORT;TENDER;THROBBING
DESCRIPTORS: ACHING;DISCOMFORT;TENDER;THROBBING
DESCRIPTORS: SHARP

## 2019-02-16 ASSESSMENT — PAIN DESCRIPTION - ORIENTATION
ORIENTATION: RIGHT;LOWER;MID
ORIENTATION: RIGHT;LOWER;MID
ORIENTATION: RIGHT;LOWER

## 2019-02-16 ASSESSMENT — PAIN DESCRIPTION - PAIN TYPE
TYPE: ACUTE PAIN
TYPE: ACUTE PAIN;CHRONIC PAIN
TYPE: ACUTE PAIN
TYPE: ACUTE PAIN;CHRONIC PAIN

## 2019-02-16 ASSESSMENT — PAIN - FUNCTIONAL ASSESSMENT: PAIN_FUNCTIONAL_ASSESSMENT: ACTIVITIES ARE NOT PREVENTED

## 2019-02-16 ASSESSMENT — PAIN DESCRIPTION - LOCATION
LOCATION: ABDOMEN

## 2019-02-17 LAB
ANION GAP SERPL CALCULATED.3IONS-SCNC: 15 MEQ/L (ref 8–16)
BUN BLDV-MCNC: 10 MG/DL (ref 7–22)
CALCIUM SERPL-MCNC: 7.7 MG/DL (ref 8.5–10.5)
CHLORIDE BLD-SCNC: 100 MEQ/L (ref 98–111)
CO2: 21 MEQ/L (ref 23–33)
CREAT SERPL-MCNC: 0.7 MG/DL (ref 0.4–1.2)
GFR SERPL CREATININE-BSD FRML MDRD: > 90 ML/MIN/1.73M2
GLUCOSE BLD-MCNC: 109 MG/DL (ref 70–108)
GLUCOSE BLD-MCNC: 169 MG/DL (ref 70–108)
GLUCOSE BLD-MCNC: 173 MG/DL (ref 70–108)
GLUCOSE BLD-MCNC: 197 MG/DL (ref 70–108)
GLUCOSE BLD-MCNC: 230 MG/DL (ref 70–108)
POTASSIUM SERPL-SCNC: 4 MEQ/L (ref 3.5–5.2)
SODIUM BLD-SCNC: 136 MEQ/L (ref 135–145)

## 2019-02-17 PROCEDURE — 94640 AIRWAY INHALATION TREATMENT: CPT

## 2019-02-17 PROCEDURE — 6370000000 HC RX 637 (ALT 250 FOR IP): Performed by: HOSPITALIST

## 2019-02-17 PROCEDURE — 1200000003 HC TELEMETRY R&B

## 2019-02-17 PROCEDURE — 2580000003 HC RX 258: Performed by: INTERNAL MEDICINE

## 2019-02-17 PROCEDURE — 6370000000 HC RX 637 (ALT 250 FOR IP): Performed by: INTERNAL MEDICINE

## 2019-02-17 PROCEDURE — 6360000002 HC RX W HCPCS: Performed by: INTERNAL MEDICINE

## 2019-02-17 PROCEDURE — 82948 REAGENT STRIP/BLOOD GLUCOSE: CPT

## 2019-02-17 PROCEDURE — 80048 BASIC METABOLIC PNL TOTAL CA: CPT

## 2019-02-17 PROCEDURE — 36415 COLL VENOUS BLD VENIPUNCTURE: CPT

## 2019-02-17 PROCEDURE — 99233 SBSQ HOSP IP/OBS HIGH 50: CPT | Performed by: HOSPITALIST

## 2019-02-17 RX ADMIN — OXYCODONE HYDROCHLORIDE 10 MG: 5 TABLET ORAL at 00:05

## 2019-02-17 RX ADMIN — ACETAMINOPHEN 650 MG: 325 TABLET ORAL at 09:20

## 2019-02-17 RX ADMIN — LISINOPRIL 10 MG: 10 TABLET ORAL at 09:22

## 2019-02-17 RX ADMIN — IPRATROPIUM BROMIDE AND ALBUTEROL SULFATE 1 AMPULE: .5; 3 SOLUTION RESPIRATORY (INHALATION) at 08:31

## 2019-02-17 RX ADMIN — INSULIN LISPRO 25 UNITS: 100 INJECTION, SOLUTION INTRAVENOUS; SUBCUTANEOUS at 09:21

## 2019-02-17 RX ADMIN — ACETAMINOPHEN 650 MG: 325 TABLET ORAL at 03:33

## 2019-02-17 RX ADMIN — IPRATROPIUM BROMIDE AND ALBUTEROL SULFATE 1 AMPULE: .5; 3 SOLUTION RESPIRATORY (INHALATION) at 12:45

## 2019-02-17 RX ADMIN — INSULIN GLARGINE 75 UNITS: 100 INJECTION, SOLUTION SUBCUTANEOUS at 20:55

## 2019-02-17 RX ADMIN — ENOXAPARIN SODIUM 40 MG: 40 INJECTION SUBCUTANEOUS at 09:20

## 2019-02-17 RX ADMIN — ACETAMINOPHEN 650 MG: 325 TABLET ORAL at 20:54

## 2019-02-17 RX ADMIN — ACETAMINOPHEN 650 MG: 325 TABLET ORAL at 16:14

## 2019-02-17 RX ADMIN — INSULIN LISPRO 1 UNITS: 100 INJECTION, SOLUTION INTRAVENOUS; SUBCUTANEOUS at 20:55

## 2019-02-17 RX ADMIN — GUAIFENESIN 600 MG: 600 TABLET, EXTENDED RELEASE ORAL at 09:22

## 2019-02-17 RX ADMIN — INSULIN LISPRO 4 UNITS: 100 INJECTION, SOLUTION INTRAVENOUS; SUBCUTANEOUS at 11:37

## 2019-02-17 RX ADMIN — INSULIN LISPRO 25 UNITS: 100 INJECTION, SOLUTION INTRAVENOUS; SUBCUTANEOUS at 11:37

## 2019-02-17 RX ADMIN — CEFTRIAXONE SODIUM 2 G: 2 INJECTION, POWDER, FOR SOLUTION INTRAMUSCULAR; INTRAVENOUS at 09:21

## 2019-02-17 RX ADMIN — IPRATROPIUM BROMIDE AND ALBUTEROL SULFATE 1 AMPULE: .5; 3 SOLUTION RESPIRATORY (INHALATION) at 16:01

## 2019-02-17 RX ADMIN — OXYCODONE HYDROCHLORIDE 10 MG: 5 TABLET ORAL at 09:21

## 2019-02-17 RX ADMIN — SERTRALINE 150 MG: 100 TABLET, FILM COATED ORAL at 09:22

## 2019-02-17 RX ADMIN — MIRTAZAPINE 15 MG: 15 TABLET, FILM COATED ORAL at 20:54

## 2019-02-17 RX ADMIN — IPRATROPIUM BROMIDE AND ALBUTEROL SULFATE 1 AMPULE: .5; 3 SOLUTION RESPIRATORY (INHALATION) at 20:06

## 2019-02-17 RX ADMIN — GUAIFENESIN 600 MG: 600 TABLET, EXTENDED RELEASE ORAL at 20:54

## 2019-02-17 RX ADMIN — INSULIN LISPRO 2 UNITS: 100 INJECTION, SOLUTION INTRAVENOUS; SUBCUTANEOUS at 09:20

## 2019-02-17 RX ADMIN — OXYCODONE HYDROCHLORIDE 10 MG: 5 TABLET ORAL at 18:36

## 2019-02-17 ASSESSMENT — PAIN DESCRIPTION - ONSET
ONSET: ON-GOING
ONSET: ON-GOING

## 2019-02-17 ASSESSMENT — PAIN SCALES - GENERAL
PAINLEVEL_OUTOF10: 2
PAINLEVEL_OUTOF10: 2
PAINLEVEL_OUTOF10: 9
PAINLEVEL_OUTOF10: 7
PAINLEVEL_OUTOF10: 6
PAINLEVEL_OUTOF10: 6
PAINLEVEL_OUTOF10: 0
PAINLEVEL_OUTOF10: 0
PAINLEVEL_OUTOF10: 6
PAINLEVEL_OUTOF10: 9
PAINLEVEL_OUTOF10: 8
PAINLEVEL_OUTOF10: 5

## 2019-02-17 ASSESSMENT — PAIN DESCRIPTION - ORIENTATION
ORIENTATION: RIGHT;LOWER

## 2019-02-17 ASSESSMENT — PAIN DESCRIPTION - FREQUENCY
FREQUENCY: CONTINUOUS
FREQUENCY: CONTINUOUS

## 2019-02-17 ASSESSMENT — PAIN DESCRIPTION - PAIN TYPE
TYPE: ACUTE PAIN;CHRONIC PAIN
TYPE: ACUTE PAIN
TYPE: ACUTE PAIN;CHRONIC PAIN
TYPE: ACUTE PAIN

## 2019-02-17 ASSESSMENT — PAIN - FUNCTIONAL ASSESSMENT
PAIN_FUNCTIONAL_ASSESSMENT: PREVENTS OR INTERFERES SOME ACTIVE ACTIVITIES AND ADLS

## 2019-02-17 ASSESSMENT — PAIN DESCRIPTION - DESCRIPTORS
DESCRIPTORS: ACHING;DISCOMFORT;TENDER
DESCRIPTORS: ACHING;DISCOMFORT
DESCRIPTORS: ACHING
DESCRIPTORS: CONSTANT;DISCOMFORT;SHARP;SHOOTING

## 2019-02-17 ASSESSMENT — PAIN DESCRIPTION - LOCATION
LOCATION: ABDOMEN
LOCATION: LEG
LOCATION: ABDOMEN

## 2019-02-17 ASSESSMENT — PAIN DESCRIPTION - PROGRESSION
CLINICAL_PROGRESSION: NOT CHANGED
CLINICAL_PROGRESSION: NOT CHANGED

## 2019-02-18 ENCOUNTER — ANESTHESIA EVENT (OUTPATIENT)
Dept: OPERATING ROOM | Age: 49
DRG: 492 | End: 2019-02-18
Payer: MEDICARE

## 2019-02-18 ENCOUNTER — ANESTHESIA (OUTPATIENT)
Dept: OPERATING ROOM | Age: 49
DRG: 492 | End: 2019-02-18
Payer: MEDICARE

## 2019-02-18 VITALS
OXYGEN SATURATION: 100 % | DIASTOLIC BLOOD PRESSURE: 71 MMHG | RESPIRATION RATE: 12 BRPM | SYSTOLIC BLOOD PRESSURE: 123 MMHG

## 2019-02-18 LAB
AEROBIC CULTURE: ABNORMAL
AEROBIC CULTURE: ABNORMAL
ANION GAP SERPL CALCULATED.3IONS-SCNC: 14 MEQ/L (ref 8–16)
BLOOD CULTURE, ROUTINE: ABNORMAL
BUN BLDV-MCNC: 6 MG/DL (ref 7–22)
CALCIUM SERPL-MCNC: 8.3 MG/DL (ref 8.5–10.5)
CHLORIDE BLD-SCNC: 102 MEQ/L (ref 98–111)
CO2: 23 MEQ/L (ref 23–33)
CREAT SERPL-MCNC: 0.6 MG/DL (ref 0.4–1.2)
GFR SERPL CREATININE-BSD FRML MDRD: > 90 ML/MIN/1.73M2
GLUCOSE BLD-MCNC: 273 MG/DL (ref 70–108)
GLUCOSE BLD-MCNC: 455 MG/DL (ref 70–108)
GLUCOSE BLD-MCNC: 70 MG/DL (ref 70–108)
GLUCOSE BLD-MCNC: 81 MG/DL (ref 70–108)
GLUCOSE BLD-MCNC: 87 MG/DL (ref 70–108)
GRAM STAIN RESULT: ABNORMAL
ORGANISM: ABNORMAL
POTASSIUM SERPL-SCNC: 3.4 MEQ/L (ref 3.5–5.2)
SODIUM BLD-SCNC: 139 MEQ/L (ref 135–145)

## 2019-02-18 PROCEDURE — 2580000003 HC RX 258: Performed by: INTERNAL MEDICINE

## 2019-02-18 PROCEDURE — 97110 THERAPEUTIC EXERCISES: CPT

## 2019-02-18 PROCEDURE — 3600000003 HC SURGERY LEVEL 3 BASE: Performed by: ORTHOPAEDIC SURGERY

## 2019-02-18 PROCEDURE — 87075 CULTR BACTERIA EXCEPT BLOOD: CPT

## 2019-02-18 PROCEDURE — 3700000000 HC ANESTHESIA ATTENDED CARE: Performed by: ORTHOPAEDIC SURGERY

## 2019-02-18 PROCEDURE — 94640 AIRWAY INHALATION TREATMENT: CPT

## 2019-02-18 PROCEDURE — 36415 COLL VENOUS BLD VENIPUNCTURE: CPT

## 2019-02-18 PROCEDURE — 2709999900 HC NON-CHARGEABLE SUPPLY: Performed by: ORTHOPAEDIC SURGERY

## 2019-02-18 PROCEDURE — 2580000003 HC RX 258: Performed by: NURSE ANESTHETIST, CERTIFIED REGISTERED

## 2019-02-18 PROCEDURE — 6360000002 HC RX W HCPCS: Performed by: INTERNAL MEDICINE

## 2019-02-18 PROCEDURE — 7100000001 HC PACU RECOVERY - ADDTL 15 MIN: Performed by: ORTHOPAEDIC SURGERY

## 2019-02-18 PROCEDURE — 97530 THERAPEUTIC ACTIVITIES: CPT

## 2019-02-18 PROCEDURE — 3600000013 HC SURGERY LEVEL 3 ADDTL 15MIN: Performed by: ORTHOPAEDIC SURGERY

## 2019-02-18 PROCEDURE — 6370000000 HC RX 637 (ALT 250 FOR IP): Performed by: INTERNAL MEDICINE

## 2019-02-18 PROCEDURE — 87205 SMEAR GRAM STAIN: CPT

## 2019-02-18 PROCEDURE — 1200000003 HC TELEMETRY R&B

## 2019-02-18 PROCEDURE — 2500000003 HC RX 250 WO HCPCS: Performed by: NURSE ANESTHETIST, CERTIFIED REGISTERED

## 2019-02-18 PROCEDURE — 2580000003 HC RX 258: Performed by: HOSPITALIST

## 2019-02-18 PROCEDURE — 6360000002 HC RX W HCPCS: Performed by: ANESTHESIOLOGY

## 2019-02-18 PROCEDURE — 6370000000 HC RX 637 (ALT 250 FOR IP): Performed by: HOSPITALIST

## 2019-02-18 PROCEDURE — 6360000002 HC RX W HCPCS: Performed by: NURSE ANESTHETIST, CERTIFIED REGISTERED

## 2019-02-18 PROCEDURE — 7100000000 HC PACU RECOVERY - FIRST 15 MIN: Performed by: ORTHOPAEDIC SURGERY

## 2019-02-18 PROCEDURE — 82948 REAGENT STRIP/BLOOD GLUCOSE: CPT

## 2019-02-18 PROCEDURE — 87070 CULTURE OTHR SPECIMN AEROBIC: CPT

## 2019-02-18 PROCEDURE — 80048 BASIC METABOLIC PNL TOTAL CA: CPT

## 2019-02-18 PROCEDURE — 3700000001 HC ADD 15 MINUTES (ANESTHESIA): Performed by: ORTHOPAEDIC SURGERY

## 2019-02-18 PROCEDURE — 99233 SBSQ HOSP IP/OBS HIGH 50: CPT | Performed by: HOSPITALIST

## 2019-02-18 RX ORDER — SODIUM CHLORIDE 9 MG/ML
INJECTION, SOLUTION INTRAVENOUS CONTINUOUS PRN
Status: DISCONTINUED | OUTPATIENT
Start: 2019-02-18 | End: 2019-02-18 | Stop reason: SDUPTHER

## 2019-02-18 RX ORDER — FENTANYL CITRATE 50 UG/ML
INJECTION, SOLUTION INTRAMUSCULAR; INTRAVENOUS PRN
Status: DISCONTINUED | OUTPATIENT
Start: 2019-02-18 | End: 2019-02-18 | Stop reason: SDUPTHER

## 2019-02-18 RX ORDER — DEXAMETHASONE SODIUM PHOSPHATE 4 MG/ML
INJECTION, SOLUTION INTRA-ARTICULAR; INTRALESIONAL; INTRAMUSCULAR; INTRAVENOUS; SOFT TISSUE PRN
Status: DISCONTINUED | OUTPATIENT
Start: 2019-02-18 | End: 2019-02-18 | Stop reason: SDUPTHER

## 2019-02-18 RX ORDER — MIDAZOLAM HYDROCHLORIDE 1 MG/ML
INJECTION INTRAMUSCULAR; INTRAVENOUS PRN
Status: DISCONTINUED | OUTPATIENT
Start: 2019-02-18 | End: 2019-02-18 | Stop reason: SDUPTHER

## 2019-02-18 RX ORDER — LABETALOL HYDROCHLORIDE 5 MG/ML
5 INJECTION, SOLUTION INTRAVENOUS EVERY 10 MIN PRN
Status: DISCONTINUED | OUTPATIENT
Start: 2019-02-18 | End: 2019-02-18 | Stop reason: HOSPADM

## 2019-02-18 RX ORDER — FENTANYL CITRATE 50 UG/ML
50 INJECTION, SOLUTION INTRAMUSCULAR; INTRAVENOUS EVERY 5 MIN PRN
Status: DISCONTINUED | OUTPATIENT
Start: 2019-02-18 | End: 2019-02-18 | Stop reason: HOSPADM

## 2019-02-18 RX ORDER — SUCCINYLCHOLINE CHLORIDE 20 MG/ML
INJECTION INTRAMUSCULAR; INTRAVENOUS PRN
Status: DISCONTINUED | OUTPATIENT
Start: 2019-02-18 | End: 2019-02-18 | Stop reason: SDUPTHER

## 2019-02-18 RX ORDER — MEPERIDINE HYDROCHLORIDE 25 MG/ML
12.5 INJECTION INTRAMUSCULAR; INTRAVENOUS; SUBCUTANEOUS EVERY 5 MIN PRN
Status: DISCONTINUED | OUTPATIENT
Start: 2019-02-18 | End: 2019-02-18 | Stop reason: HOSPADM

## 2019-02-18 RX ORDER — ONDANSETRON 2 MG/ML
INJECTION INTRAMUSCULAR; INTRAVENOUS PRN
Status: DISCONTINUED | OUTPATIENT
Start: 2019-02-18 | End: 2019-02-18 | Stop reason: SDUPTHER

## 2019-02-18 RX ORDER — FENTANYL CITRATE 50 UG/ML
25 INJECTION, SOLUTION INTRAMUSCULAR; INTRAVENOUS EVERY 5 MIN PRN
Status: DISCONTINUED | OUTPATIENT
Start: 2019-02-18 | End: 2019-02-18 | Stop reason: HOSPADM

## 2019-02-18 RX ORDER — LIDOCAINE HYDROCHLORIDE 20 MG/ML
INJECTION, SOLUTION INFILTRATION; PERINEURAL PRN
Status: DISCONTINUED | OUTPATIENT
Start: 2019-02-18 | End: 2019-02-18 | Stop reason: SDUPTHER

## 2019-02-18 RX ORDER — OXYCODONE HYDROCHLORIDE 10 MG/1
10 TABLET ORAL EVERY 6 HOURS PRN
Qty: 30 TABLET | Refills: 0 | Status: ON HOLD | OUTPATIENT
Start: 2019-02-18 | End: 2019-03-12 | Stop reason: HOSPADM

## 2019-02-18 RX ORDER — PROPOFOL 10 MG/ML
INJECTION, EMULSION INTRAVENOUS PRN
Status: DISCONTINUED | OUTPATIENT
Start: 2019-02-18 | End: 2019-02-18 | Stop reason: SDUPTHER

## 2019-02-18 RX ORDER — PROMETHAZINE HYDROCHLORIDE 25 MG/ML
12.5 INJECTION, SOLUTION INTRAMUSCULAR; INTRAVENOUS
Status: DISCONTINUED | OUTPATIENT
Start: 2019-02-18 | End: 2019-02-18 | Stop reason: HOSPADM

## 2019-02-18 RX ADMIN — SODIUM CHLORIDE: 9 INJECTION, SOLUTION INTRAVENOUS at 09:41

## 2019-02-18 RX ADMIN — OXYCODONE HYDROCHLORIDE 10 MG: 5 TABLET ORAL at 16:22

## 2019-02-18 RX ADMIN — INSULIN LISPRO 6 UNITS: 100 INJECTION, SOLUTION INTRAVENOUS; SUBCUTANEOUS at 16:23

## 2019-02-18 RX ADMIN — INSULIN LISPRO 25 UNITS: 100 INJECTION, SOLUTION INTRAVENOUS; SUBCUTANEOUS at 16:23

## 2019-02-18 RX ADMIN — ACETAMINOPHEN 650 MG: 325 TABLET ORAL at 16:22

## 2019-02-18 RX ADMIN — PROPOFOL 150 MG: 10 INJECTION, EMULSION INTRAVENOUS at 12:22

## 2019-02-18 RX ADMIN — SODIUM CHLORIDE: 9 INJECTION, SOLUTION INTRAVENOUS at 12:16

## 2019-02-18 RX ADMIN — IPRATROPIUM BROMIDE AND ALBUTEROL SULFATE 1 AMPULE: .5; 3 SOLUTION RESPIRATORY (INHALATION) at 20:43

## 2019-02-18 RX ADMIN — SERTRALINE 150 MG: 100 TABLET, FILM COATED ORAL at 09:30

## 2019-02-18 RX ADMIN — ONDANSETRON HYDROCHLORIDE 4 MG: 4 INJECTION, SOLUTION INTRAMUSCULAR; INTRAVENOUS at 12:22

## 2019-02-18 RX ADMIN — LISINOPRIL 10 MG: 10 TABLET ORAL at 09:30

## 2019-02-18 RX ADMIN — INSULIN LISPRO 6 UNITS: 100 INJECTION, SOLUTION INTRAVENOUS; SUBCUTANEOUS at 20:24

## 2019-02-18 RX ADMIN — OXYCODONE HYDROCHLORIDE 10 MG: 5 TABLET ORAL at 09:30

## 2019-02-18 RX ADMIN — LIDOCAINE HYDROCHLORIDE 100 MG: 20 INJECTION, SOLUTION INFILTRATION; PERINEURAL at 12:22

## 2019-02-18 RX ADMIN — FENTANYL CITRATE 50 MCG: 50 INJECTION, SOLUTION INTRAMUSCULAR; INTRAVENOUS at 13:00

## 2019-02-18 RX ADMIN — Medication 10 ML: at 20:19

## 2019-02-18 RX ADMIN — MIDAZOLAM HYDROCHLORIDE 2 MG: 1 INJECTION, SOLUTION INTRAMUSCULAR; INTRAVENOUS at 12:16

## 2019-02-18 RX ADMIN — FENTANYL CITRATE 50 MCG: 50 INJECTION INTRAMUSCULAR; INTRAVENOUS at 12:22

## 2019-02-18 RX ADMIN — FENTANYL CITRATE 50 MCG: 50 INJECTION, SOLUTION INTRAMUSCULAR; INTRAVENOUS at 13:10

## 2019-02-18 RX ADMIN — CEFTRIAXONE SODIUM 2 G: 2 INJECTION, POWDER, FOR SOLUTION INTRAMUSCULAR; INTRAVENOUS at 09:31

## 2019-02-18 RX ADMIN — INSULIN GLARGINE 75 UNITS: 100 INJECTION, SOLUTION SUBCUTANEOUS at 20:23

## 2019-02-18 RX ADMIN — DEXAMETHASONE SODIUM PHOSPHATE 10 MG: 4 INJECTION, SOLUTION INTRAMUSCULAR; INTRAVENOUS at 12:22

## 2019-02-18 RX ADMIN — MIRTAZAPINE 15 MG: 15 TABLET, FILM COATED ORAL at 20:19

## 2019-02-18 RX ADMIN — IPRATROPIUM BROMIDE AND ALBUTEROL SULFATE 1 AMPULE: .5; 3 SOLUTION RESPIRATORY (INHALATION) at 09:39

## 2019-02-18 RX ADMIN — IPRATROPIUM BROMIDE AND ALBUTEROL SULFATE 1 AMPULE: .5; 3 SOLUTION RESPIRATORY (INHALATION) at 16:11

## 2019-02-18 RX ADMIN — Medication 10 ML: at 09:31

## 2019-02-18 RX ADMIN — PHENYLEPHRINE HYDROCHLORIDE 100 MCG: 10 INJECTION INTRAVENOUS at 12:32

## 2019-02-18 RX ADMIN — GUAIFENESIN 600 MG: 600 TABLET, EXTENDED RELEASE ORAL at 20:19

## 2019-02-18 RX ADMIN — ACETAMINOPHEN 650 MG: 325 TABLET ORAL at 09:30

## 2019-02-18 RX ADMIN — ACETAMINOPHEN 650 MG: 325 TABLET ORAL at 20:19

## 2019-02-18 RX ADMIN — GUAIFENESIN 600 MG: 600 TABLET, EXTENDED RELEASE ORAL at 09:30

## 2019-02-18 RX ADMIN — SUCCINYLCHOLINE CHLORIDE 120 MG: 20 INJECTION, SOLUTION INTRAMUSCULAR; INTRAVENOUS at 12:22

## 2019-02-18 ASSESSMENT — PAIN DESCRIPTION - DESCRIPTORS
DESCRIPTORS: ACHING;DISCOMFORT
DESCRIPTORS: CONSTANT;DISCOMFORT;SHARP;SHOOTING

## 2019-02-18 ASSESSMENT — PAIN DESCRIPTION - LOCATION
LOCATION: LEG
LOCATION: ABDOMEN
LOCATION: LEG

## 2019-02-18 ASSESSMENT — PAIN SCALES - GENERAL
PAINLEVEL_OUTOF10: 7
PAINLEVEL_OUTOF10: 8
PAINLEVEL_OUTOF10: 5
PAINLEVEL_OUTOF10: 0
PAINLEVEL_OUTOF10: 0
PAINLEVEL_OUTOF10: 2
PAINLEVEL_OUTOF10: 7
PAINLEVEL_OUTOF10: 0
PAINLEVEL_OUTOF10: 10
PAINLEVEL_OUTOF10: 8
PAINLEVEL_OUTOF10: 1
PAINLEVEL_OUTOF10: 9
PAINLEVEL_OUTOF10: 0

## 2019-02-18 ASSESSMENT — PAIN DESCRIPTION - PAIN TYPE
TYPE: ACUTE PAIN;SURGICAL PAIN
TYPE: SURGICAL PAIN
TYPE: ACUTE PAIN

## 2019-02-18 ASSESSMENT — PULMONARY FUNCTION TESTS
PIF_VALUE: 23
PIF_VALUE: 22
PIF_VALUE: 4
PIF_VALUE: 22
PIF_VALUE: 25
PIF_VALUE: 28
PIF_VALUE: 23
PIF_VALUE: 23
PIF_VALUE: 21
PIF_VALUE: 23
PIF_VALUE: 24
PIF_VALUE: 23
PIF_VALUE: 25
PIF_VALUE: 30
PIF_VALUE: 0
PIF_VALUE: 26
PIF_VALUE: 24
PIF_VALUE: 23
PIF_VALUE: 2
PIF_VALUE: 23
PIF_VALUE: 28
PIF_VALUE: 4
PIF_VALUE: 0
PIF_VALUE: 22

## 2019-02-18 ASSESSMENT — PAIN DESCRIPTION - ONSET: ONSET: ON-GOING

## 2019-02-18 ASSESSMENT — PAIN DESCRIPTION - PROGRESSION: CLINICAL_PROGRESSION: NOT CHANGED

## 2019-02-18 ASSESSMENT — PAIN DESCRIPTION - ORIENTATION
ORIENTATION: RIGHT
ORIENTATION: RIGHT;LOWER
ORIENTATION: RIGHT

## 2019-02-18 ASSESSMENT — PAIN DESCRIPTION - FREQUENCY
FREQUENCY: CONTINUOUS
FREQUENCY: CONTINUOUS

## 2019-02-19 LAB
ANION GAP SERPL CALCULATED.3IONS-SCNC: 13 MEQ/L (ref 8–16)
ANISOCYTOSIS: PRESENT
BASOPHILIA: ABNORMAL
BASOPHILS # BLD: 0 %
BASOPHILS ABSOLUTE: 0 THOU/MM3 (ref 0–0.1)
BUN BLDV-MCNC: 8 MG/DL (ref 7–22)
CALCIUM SERPL-MCNC: 8.6 MG/DL (ref 8.5–10.5)
CHLORIDE BLD-SCNC: 99 MEQ/L (ref 98–111)
CO2: 23 MEQ/L (ref 23–33)
CREAT SERPL-MCNC: 0.6 MG/DL (ref 0.4–1.2)
DIFFERENTIAL, MANUAL: NORMAL
EOSINOPHIL # BLD: 1 %
EOSINOPHILS ABSOLUTE: 0.1 THOU/MM3 (ref 0–0.4)
ERYTHROCYTE [DISTWIDTH] IN BLOOD BY AUTOMATED COUNT: 14.2 % (ref 11.5–14.5)
ERYTHROCYTE [DISTWIDTH] IN BLOOD BY AUTOMATED COUNT: 46.3 FL (ref 35–45)
GFR SERPL CREATININE-BSD FRML MDRD: > 90 ML/MIN/1.73M2
GLUCOSE BLD-MCNC: 157 MG/DL (ref 70–108)
GLUCOSE BLD-MCNC: 161 MG/DL (ref 70–108)
GLUCOSE BLD-MCNC: 272 MG/DL (ref 70–108)
GLUCOSE BLD-MCNC: 309 MG/DL (ref 70–108)
GLUCOSE BLD-MCNC: 318 MG/DL (ref 70–108)
HCT VFR BLD CALC: 36.1 % (ref 42–52)
HEMOGLOBIN: 11.4 GM/DL (ref 14–18)
LYMPHOCYTES # BLD: 16 %
LYMPHOCYTES ABSOLUTE: 2 THOU/MM3 (ref 1–4.8)
MCH RBC QN AUTO: 28.3 PG (ref 26–33)
MCHC RBC AUTO-ENTMCNC: 31.6 GM/DL (ref 32.2–35.5)
MCV RBC AUTO: 89.6 FL (ref 80–94)
METAMYELOCYTES: 2 %
MONOCYTES # BLD: 3 %
MONOCYTES ABSOLUTE: 0.4 THOU/MM3 (ref 0.4–1.3)
MYELOCYTES: 1 %
NUCLEATED RED BLOOD CELLS: 0 /100 WBC
PLATELET # BLD: 365 THOU/MM3 (ref 130–400)
PMV BLD AUTO: 9.3 FL (ref 9.4–12.4)
POTASSIUM SERPL-SCNC: 4.2 MEQ/L (ref 3.5–5.2)
RBC # BLD: 4.03 MILL/MM3 (ref 4.7–6.1)
SEG NEUTROPHILS: 77 %
SEGMENTED NEUTROPHILS ABSOLUTE COUNT: 9.5 THOU/MM3 (ref 1.8–7.7)
SODIUM BLD-SCNC: 135 MEQ/L (ref 135–145)
SPHEROCYTES: ABNORMAL
TOXIC GRANULATION: PRESENT
WBC # BLD: 12.3 THOU/MM3 (ref 4.8–10.8)

## 2019-02-19 PROCEDURE — 1200000003 HC TELEMETRY R&B

## 2019-02-19 PROCEDURE — 36415 COLL VENOUS BLD VENIPUNCTURE: CPT

## 2019-02-19 PROCEDURE — 6360000002 HC RX W HCPCS: Performed by: INTERNAL MEDICINE

## 2019-02-19 PROCEDURE — 6370000000 HC RX 637 (ALT 250 FOR IP): Performed by: INTERNAL MEDICINE

## 2019-02-19 PROCEDURE — 2580000003 HC RX 258: Performed by: INTERNAL MEDICINE

## 2019-02-19 PROCEDURE — 97116 GAIT TRAINING THERAPY: CPT

## 2019-02-19 PROCEDURE — 80048 BASIC METABOLIC PNL TOTAL CA: CPT

## 2019-02-19 PROCEDURE — 94640 AIRWAY INHALATION TREATMENT: CPT

## 2019-02-19 PROCEDURE — 99233 SBSQ HOSP IP/OBS HIGH 50: CPT | Performed by: HOSPITALIST

## 2019-02-19 PROCEDURE — 6370000000 HC RX 637 (ALT 250 FOR IP): Performed by: HOSPITALIST

## 2019-02-19 PROCEDURE — 85025 COMPLETE CBC W/AUTO DIFF WBC: CPT

## 2019-02-19 PROCEDURE — 94760 N-INVAS EAR/PLS OXIMETRY 1: CPT

## 2019-02-19 PROCEDURE — 82948 REAGENT STRIP/BLOOD GLUCOSE: CPT

## 2019-02-19 PROCEDURE — 97110 THERAPEUTIC EXERCISES: CPT

## 2019-02-19 RX ORDER — BISACODYL 10 MG
10 SUPPOSITORY, RECTAL RECTAL DAILY PRN
Status: DISCONTINUED | OUTPATIENT
Start: 2019-02-19 | End: 2019-02-20 | Stop reason: HOSPADM

## 2019-02-19 RX ADMIN — GUAIFENESIN 600 MG: 600 TABLET, EXTENDED RELEASE ORAL at 21:17

## 2019-02-19 RX ADMIN — INSULIN LISPRO 2 UNITS: 100 INJECTION, SOLUTION INTRAVENOUS; SUBCUTANEOUS at 17:31

## 2019-02-19 RX ADMIN — INSULIN LISPRO 25 UNITS: 100 INJECTION, SOLUTION INTRAVENOUS; SUBCUTANEOUS at 11:22

## 2019-02-19 RX ADMIN — INSULIN LISPRO 1 UNITS: 100 INJECTION, SOLUTION INTRAVENOUS; SUBCUTANEOUS at 21:18

## 2019-02-19 RX ADMIN — Medication 10 ML: at 09:03

## 2019-02-19 RX ADMIN — OXYCODONE HYDROCHLORIDE 10 MG: 5 TABLET ORAL at 05:11

## 2019-02-19 RX ADMIN — IPRATROPIUM BROMIDE AND ALBUTEROL SULFATE 1 AMPULE: .5; 3 SOLUTION RESPIRATORY (INHALATION) at 21:22

## 2019-02-19 RX ADMIN — GUAIFENESIN 600 MG: 600 TABLET, EXTENDED RELEASE ORAL at 08:59

## 2019-02-19 RX ADMIN — ACETAMINOPHEN 650 MG: 325 TABLET ORAL at 05:10

## 2019-02-19 RX ADMIN — BISACODYL 10 MG: 10 SUPPOSITORY RECTAL at 13:00

## 2019-02-19 RX ADMIN — INSULIN GLARGINE 75 UNITS: 100 INJECTION, SOLUTION SUBCUTANEOUS at 21:18

## 2019-02-19 RX ADMIN — ENOXAPARIN SODIUM 40 MG: 40 INJECTION SUBCUTANEOUS at 08:57

## 2019-02-19 RX ADMIN — MAGNESIUM HYDROXIDE 30 ML: 400 SUSPENSION ORAL at 12:20

## 2019-02-19 RX ADMIN — IPRATROPIUM BROMIDE AND ALBUTEROL SULFATE 1 AMPULE: .5; 3 SOLUTION RESPIRATORY (INHALATION) at 10:14

## 2019-02-19 RX ADMIN — IPRATROPIUM BROMIDE AND ALBUTEROL SULFATE 1 AMPULE: .5; 3 SOLUTION RESPIRATORY (INHALATION) at 06:10

## 2019-02-19 RX ADMIN — INSULIN LISPRO 25 UNITS: 100 INJECTION, SOLUTION INTRAVENOUS; SUBCUTANEOUS at 17:32

## 2019-02-19 RX ADMIN — LISINOPRIL 10 MG: 10 TABLET ORAL at 09:00

## 2019-02-19 RX ADMIN — ACETAMINOPHEN 650 MG: 325 TABLET ORAL at 11:24

## 2019-02-19 RX ADMIN — INSULIN LISPRO 25 UNITS: 100 INJECTION, SOLUTION INTRAVENOUS; SUBCUTANEOUS at 08:16

## 2019-02-19 RX ADMIN — INSULIN LISPRO 8 UNITS: 100 INJECTION, SOLUTION INTRAVENOUS; SUBCUTANEOUS at 08:07

## 2019-02-19 RX ADMIN — IPRATROPIUM BROMIDE AND ALBUTEROL SULFATE 1 AMPULE: .5; 3 SOLUTION RESPIRATORY (INHALATION) at 15:28

## 2019-02-19 RX ADMIN — MIRTAZAPINE 15 MG: 15 TABLET, FILM COATED ORAL at 21:18

## 2019-02-19 RX ADMIN — OXYCODONE HYDROCHLORIDE 10 MG: 5 TABLET ORAL at 21:18

## 2019-02-19 RX ADMIN — CEFTRIAXONE SODIUM 2 G: 2 INJECTION, POWDER, FOR SOLUTION INTRAMUSCULAR; INTRAVENOUS at 09:04

## 2019-02-19 RX ADMIN — SERTRALINE 150 MG: 100 TABLET, FILM COATED ORAL at 09:01

## 2019-02-19 RX ADMIN — ACETAMINOPHEN 650 MG: 325 TABLET ORAL at 17:30

## 2019-02-19 RX ADMIN — INSULIN LISPRO 6 UNITS: 100 INJECTION, SOLUTION INTRAVENOUS; SUBCUTANEOUS at 11:16

## 2019-02-19 ASSESSMENT — PAIN DESCRIPTION - DESCRIPTORS
DESCRIPTORS: SHARP

## 2019-02-19 ASSESSMENT — PAIN DESCRIPTION - FREQUENCY
FREQUENCY: INTERMITTENT

## 2019-02-19 ASSESSMENT — PAIN DESCRIPTION - PAIN TYPE
TYPE: ACUTE PAIN

## 2019-02-19 ASSESSMENT — PAIN DESCRIPTION - LOCATION
LOCATION: LEG

## 2019-02-19 ASSESSMENT — PAIN SCALES - GENERAL
PAINLEVEL_OUTOF10: 4
PAINLEVEL_OUTOF10: 6
PAINLEVEL_OUTOF10: 8
PAINLEVEL_OUTOF10: 8
PAINLEVEL_OUTOF10: 5
PAINLEVEL_OUTOF10: 5
PAINLEVEL_OUTOF10: 4
PAINLEVEL_OUTOF10: 4
PAINLEVEL_OUTOF10: 5
PAINLEVEL_OUTOF10: 6
PAINLEVEL_OUTOF10: 6

## 2019-02-19 ASSESSMENT — PAIN DESCRIPTION - ORIENTATION
ORIENTATION: RIGHT

## 2019-02-20 ENCOUNTER — HOSPITAL ENCOUNTER (INPATIENT)
Age: 49
LOS: 20 days | Discharge: HOME HEALTH | DRG: 564 | End: 2019-03-12
Attending: FAMILY MEDICINE | Admitting: FAMILY MEDICINE
Payer: MEDICARE

## 2019-02-20 VITALS
TEMPERATURE: 97.8 F | OXYGEN SATURATION: 94 % | WEIGHT: 217.8 LBS | BODY MASS INDEX: 35 KG/M2 | HEIGHT: 66 IN | SYSTOLIC BLOOD PRESSURE: 144 MMHG | DIASTOLIC BLOOD PRESSURE: 70 MMHG | HEART RATE: 86 BPM | RESPIRATION RATE: 18 BRPM

## 2019-02-20 DIAGNOSIS — Z79.4 TYPE 2 DIABETES MELLITUS WITH OTHER CIRCULATORY COMPLICATION, WITH LONG-TERM CURRENT USE OF INSULIN (HCC): ICD-10-CM

## 2019-02-20 DIAGNOSIS — E11.59 TYPE 2 DIABETES MELLITUS WITH OTHER CIRCULATORY COMPLICATION, WITH LONG-TERM CURRENT USE OF INSULIN (HCC): ICD-10-CM

## 2019-02-20 DIAGNOSIS — T87.40 INFECTION OF BELOW KNEE AMPUTATION STUMP (HCC): Primary | ICD-10-CM

## 2019-02-20 PROBLEM — R53.1 WEAKNESS: Status: ACTIVE | Noted: 2019-02-20

## 2019-02-20 LAB
GLUCOSE BLD-MCNC: 130 MG/DL (ref 70–108)
GLUCOSE BLD-MCNC: 236 MG/DL (ref 70–108)
GLUCOSE BLD-MCNC: 247 MG/DL (ref 70–108)
GLUCOSE BLD-MCNC: 266 MG/DL (ref 70–108)
GLUCOSE BLD-MCNC: 55 MG/DL (ref 70–108)
GLUCOSE BLD-MCNC: 56 MG/DL (ref 70–108)
GLUCOSE BLD-MCNC: 65 MG/DL (ref 70–108)
GLUCOSE BLD-MCNC: 73 MG/DL (ref 70–108)

## 2019-02-20 PROCEDURE — 2580000003 HC RX 258: Performed by: INTERNAL MEDICINE

## 2019-02-20 PROCEDURE — 97530 THERAPEUTIC ACTIVITIES: CPT

## 2019-02-20 PROCEDURE — 82948 REAGENT STRIP/BLOOD GLUCOSE: CPT

## 2019-02-20 PROCEDURE — 6370000000 HC RX 637 (ALT 250 FOR IP): Performed by: INTERNAL MEDICINE

## 2019-02-20 PROCEDURE — 6370000000 HC RX 637 (ALT 250 FOR IP): Performed by: HOSPITALIST

## 2019-02-20 PROCEDURE — 6360000002 HC RX W HCPCS: Performed by: INTERNAL MEDICINE

## 2019-02-20 PROCEDURE — 97116 GAIT TRAINING THERAPY: CPT

## 2019-02-20 PROCEDURE — 99239 HOSP IP/OBS DSCHRG MGMT >30: CPT | Performed by: NURSE PRACTITIONER

## 2019-02-20 PROCEDURE — 94640 AIRWAY INHALATION TREATMENT: CPT

## 2019-02-20 PROCEDURE — 0220000000 HC SKILLED NURSING FACILITY

## 2019-02-20 PROCEDURE — 1290000000 HC SEMI PRIVATE OTHER R&B

## 2019-02-20 PROCEDURE — 6370000000 HC RX 637 (ALT 250 FOR IP): Performed by: FAMILY MEDICINE

## 2019-02-20 PROCEDURE — 97110 THERAPEUTIC EXERCISES: CPT

## 2019-02-20 PROCEDURE — 2709999900 HC NON-CHARGEABLE SUPPLY

## 2019-02-20 RX ORDER — GUAIFENESIN/DEXTROMETHORPHAN 100-10MG/5
5 SYRUP ORAL EVERY 4 HOURS PRN
Status: CANCELLED | OUTPATIENT
Start: 2019-02-20

## 2019-02-20 RX ORDER — SODIUM CHLORIDE 0.9 % (FLUSH) 0.9 %
10 SYRINGE (ML) INJECTION PRN
Status: DISCONTINUED | OUTPATIENT
Start: 2019-02-20 | End: 2019-03-12 | Stop reason: HOSPADM

## 2019-02-20 RX ORDER — OXYCODONE HYDROCHLORIDE 5 MG/1
10 TABLET ORAL EVERY 6 HOURS PRN
Status: DISCONTINUED | OUTPATIENT
Start: 2019-02-20 | End: 2019-02-23

## 2019-02-20 RX ORDER — MIRTAZAPINE 15 MG/1
15 TABLET, FILM COATED ORAL NIGHTLY
Status: CANCELLED | OUTPATIENT
Start: 2019-02-20

## 2019-02-20 RX ORDER — DEXTROSE MONOHYDRATE 50 MG/ML
100 INJECTION, SOLUTION INTRAVENOUS PRN
Status: CANCELLED | OUTPATIENT
Start: 2019-02-20

## 2019-02-20 RX ORDER — SODIUM CHLORIDE 0.9 % (FLUSH) 0.9 %
10 SYRINGE (ML) INJECTION EVERY 12 HOURS SCHEDULED
Status: DISCONTINUED | OUTPATIENT
Start: 2019-02-20 | End: 2019-02-23

## 2019-02-20 RX ORDER — ONDANSETRON 2 MG/ML
4 INJECTION INTRAMUSCULAR; INTRAVENOUS EVERY 6 HOURS PRN
Status: DISCONTINUED | OUTPATIENT
Start: 2019-02-20 | End: 2019-03-02

## 2019-02-20 RX ORDER — IPRATROPIUM BROMIDE AND ALBUTEROL SULFATE 2.5; .5 MG/3ML; MG/3ML
1 SOLUTION RESPIRATORY (INHALATION) EVERY 4 HOURS PRN
Status: DISCONTINUED | OUTPATIENT
Start: 2019-02-20 | End: 2019-03-12 | Stop reason: HOSPADM

## 2019-02-20 RX ORDER — NICOTINE POLACRILEX 4 MG
15 LOZENGE BUCCAL PRN
Status: CANCELLED | OUTPATIENT
Start: 2019-02-20

## 2019-02-20 RX ORDER — FAMOTIDINE 20 MG/1
20 TABLET, FILM COATED ORAL DAILY
Status: DISCONTINUED | OUTPATIENT
Start: 2019-02-21 | End: 2019-03-12 | Stop reason: HOSPADM

## 2019-02-20 RX ORDER — ACETAMINOPHEN 325 MG/1
650 TABLET ORAL EVERY 6 HOURS
Status: CANCELLED | OUTPATIENT
Start: 2019-02-20

## 2019-02-20 RX ORDER — LISINOPRIL 10 MG/1
10 TABLET ORAL DAILY
Status: CANCELLED | OUTPATIENT
Start: 2019-02-20

## 2019-02-20 RX ORDER — IPRATROPIUM BROMIDE AND ALBUTEROL SULFATE 2.5; .5 MG/3ML; MG/3ML
1 SOLUTION RESPIRATORY (INHALATION) EVERY 4 HOURS PRN
Status: DISCONTINUED | OUTPATIENT
Start: 2019-02-20 | End: 2019-02-20 | Stop reason: HOSPADM

## 2019-02-20 RX ORDER — SODIUM CHLORIDE 0.9 % (FLUSH) 0.9 %
10 SYRINGE (ML) INJECTION PRN
Status: CANCELLED | OUTPATIENT
Start: 2019-02-20

## 2019-02-20 RX ORDER — INSULIN GLARGINE 100 [IU]/ML
60 INJECTION, SOLUTION SUBCUTANEOUS NIGHTLY
Status: DISCONTINUED | OUTPATIENT
Start: 2019-02-20 | End: 2019-02-20

## 2019-02-20 RX ORDER — INSULIN GLARGINE 100 [IU]/ML
40 INJECTION, SOLUTION SUBCUTANEOUS NIGHTLY
Status: DISCONTINUED | OUTPATIENT
Start: 2019-02-20 | End: 2019-03-02

## 2019-02-20 RX ORDER — NICOTINE POLACRILEX 4 MG
15 LOZENGE BUCCAL PRN
Status: DISCONTINUED | OUTPATIENT
Start: 2019-02-20 | End: 2019-03-12 | Stop reason: HOSPADM

## 2019-02-20 RX ORDER — DEXTROSE MONOHYDRATE 25 G/50ML
12.5 INJECTION, SOLUTION INTRAVENOUS PRN
Status: DISCONTINUED | OUTPATIENT
Start: 2019-02-20 | End: 2019-03-12 | Stop reason: HOSPADM

## 2019-02-20 RX ORDER — GUAIFENESIN 600 MG/1
600 TABLET, EXTENDED RELEASE ORAL 2 TIMES DAILY
Status: CANCELLED | OUTPATIENT
Start: 2019-02-20

## 2019-02-20 RX ORDER — DEXTROSE MONOHYDRATE 25 G/50ML
12.5 INJECTION, SOLUTION INTRAVENOUS PRN
Status: CANCELLED | OUTPATIENT
Start: 2019-02-20

## 2019-02-20 RX ORDER — DEXTROSE MONOHYDRATE 50 MG/ML
100 INJECTION, SOLUTION INTRAVENOUS PRN
Status: DISCONTINUED | OUTPATIENT
Start: 2019-02-20 | End: 2019-03-12 | Stop reason: HOSPADM

## 2019-02-20 RX ORDER — ACETAMINOPHEN 325 MG/1
650 TABLET ORAL EVERY 6 HOURS
Status: DISCONTINUED | OUTPATIENT
Start: 2019-02-20 | End: 2019-02-26

## 2019-02-20 RX ORDER — GUAIFENESIN/DEXTROMETHORPHAN 100-10MG/5
5 SYRUP ORAL EVERY 4 HOURS PRN
Status: DISCONTINUED | OUTPATIENT
Start: 2019-02-20 | End: 2019-03-12 | Stop reason: HOSPADM

## 2019-02-20 RX ORDER — SODIUM CHLORIDE 0.9 % (FLUSH) 0.9 %
10 SYRINGE (ML) INJECTION EVERY 12 HOURS SCHEDULED
Status: CANCELLED | OUTPATIENT
Start: 2019-02-20

## 2019-02-20 RX ORDER — INSULIN GLARGINE 100 [IU]/ML
60 INJECTION, SOLUTION SUBCUTANEOUS NIGHTLY
Status: CANCELLED | OUTPATIENT
Start: 2019-02-20

## 2019-02-20 RX ORDER — GUAIFENESIN 600 MG/1
600 TABLET, EXTENDED RELEASE ORAL 2 TIMES DAILY
Status: DISCONTINUED | OUTPATIENT
Start: 2019-02-20 | End: 2019-02-23

## 2019-02-20 RX ORDER — SENNA PLUS 8.6 MG/1
1 TABLET ORAL NIGHTLY PRN
Status: DISCONTINUED | OUTPATIENT
Start: 2019-02-21 | End: 2019-03-12 | Stop reason: HOSPADM

## 2019-02-20 RX ORDER — MIRTAZAPINE 15 MG/1
15 TABLET, FILM COATED ORAL NIGHTLY
Status: DISCONTINUED | OUTPATIENT
Start: 2019-02-20 | End: 2019-03-12 | Stop reason: HOSPADM

## 2019-02-20 RX ORDER — LISINOPRIL 10 MG/1
10 TABLET ORAL DAILY
Status: DISCONTINUED | OUTPATIENT
Start: 2019-02-21 | End: 2019-03-12 | Stop reason: HOSPADM

## 2019-02-20 RX ORDER — ONDANSETRON 2 MG/ML
4 INJECTION INTRAMUSCULAR; INTRAVENOUS EVERY 6 HOURS PRN
Status: CANCELLED | OUTPATIENT
Start: 2019-02-20

## 2019-02-20 RX ORDER — POLYETHYLENE GLYCOL 3350 17 G/17G
17 POWDER, FOR SOLUTION ORAL DAILY
Status: DISCONTINUED | OUTPATIENT
Start: 2019-02-21 | End: 2019-03-12 | Stop reason: HOSPADM

## 2019-02-20 RX ORDER — IPRATROPIUM BROMIDE AND ALBUTEROL SULFATE 2.5; .5 MG/3ML; MG/3ML
1 SOLUTION RESPIRATORY (INHALATION) EVERY 4 HOURS PRN
Status: CANCELLED | OUTPATIENT
Start: 2019-02-20

## 2019-02-20 RX ORDER — BISACODYL 10 MG
10 SUPPOSITORY, RECTAL RECTAL DAILY PRN
Status: DISCONTINUED | OUTPATIENT
Start: 2019-02-20 | End: 2019-03-12 | Stop reason: HOSPADM

## 2019-02-20 RX ORDER — BISACODYL 10 MG
10 SUPPOSITORY, RECTAL RECTAL DAILY PRN
Status: CANCELLED | OUTPATIENT
Start: 2019-02-20

## 2019-02-20 RX ORDER — DOCUSATE SODIUM 100 MG/1
100 CAPSULE, LIQUID FILLED ORAL DAILY PRN
Status: DISCONTINUED | OUTPATIENT
Start: 2019-02-20 | End: 2019-03-12 | Stop reason: HOSPADM

## 2019-02-20 RX ORDER — OXYCODONE HYDROCHLORIDE 5 MG/1
10 TABLET ORAL EVERY 6 HOURS PRN
Status: CANCELLED | OUTPATIENT
Start: 2019-02-20

## 2019-02-20 RX ADMIN — GUAIFENESIN 600 MG: 600 TABLET, EXTENDED RELEASE ORAL at 08:43

## 2019-02-20 RX ADMIN — ENOXAPARIN SODIUM 40 MG: 40 INJECTION SUBCUTANEOUS at 08:51

## 2019-02-20 RX ADMIN — INSULIN GLARGINE 40 UNITS: 100 INJECTION, SOLUTION SUBCUTANEOUS at 22:13

## 2019-02-20 RX ADMIN — OXYCODONE HYDROCHLORIDE 10 MG: 5 TABLET ORAL at 11:44

## 2019-02-20 RX ADMIN — Medication 6 UNITS: at 19:07

## 2019-02-20 RX ADMIN — Medication 10 ML: at 19:58

## 2019-02-20 RX ADMIN — GUAIFENESIN 600 MG: 600 TABLET, EXTENDED RELEASE ORAL at 19:58

## 2019-02-20 RX ADMIN — SERTRALINE 150 MG: 100 TABLET, FILM COATED ORAL at 08:44

## 2019-02-20 RX ADMIN — LISINOPRIL 10 MG: 10 TABLET ORAL at 08:43

## 2019-02-20 RX ADMIN — CEFTRIAXONE SODIUM 2 G: 2 INJECTION, POWDER, FOR SOLUTION INTRAMUSCULAR; INTRAVENOUS at 09:54

## 2019-02-20 RX ADMIN — ACETAMINOPHEN 650 MG: 325 TABLET ORAL at 11:43

## 2019-02-20 RX ADMIN — ACETAMINOPHEN 650 MG: 325 TABLET ORAL at 19:04

## 2019-02-20 RX ADMIN — OXYCODONE HYDROCHLORIDE 10 MG: 5 TABLET ORAL at 05:24

## 2019-02-20 RX ADMIN — INSULIN LISPRO 25 UNITS: 100 INJECTION, SOLUTION INTRAVENOUS; SUBCUTANEOUS at 08:45

## 2019-02-20 RX ADMIN — MIRTAZAPINE 15 MG: 15 TABLET, FILM COATED ORAL at 22:13

## 2019-02-20 RX ADMIN — INSULIN LISPRO 2 UNITS: 100 INJECTION, SOLUTION INTRAVENOUS; SUBCUTANEOUS at 22:14

## 2019-02-20 RX ADMIN — Medication 10 ML: at 10:03

## 2019-02-20 RX ADMIN — IPRATROPIUM BROMIDE AND ALBUTEROL SULFATE 1 AMPULE: .5; 3 SOLUTION RESPIRATORY (INHALATION) at 07:56

## 2019-02-20 ASSESSMENT — PAIN DESCRIPTION - PROGRESSION
CLINICAL_PROGRESSION: NOT CHANGED

## 2019-02-20 ASSESSMENT — PAIN DESCRIPTION - ONSET
ONSET: ON-GOING
ONSET: ON-GOING

## 2019-02-20 ASSESSMENT — PAIN SCALES - GENERAL
PAINLEVEL_OUTOF10: 6
PAINLEVEL_OUTOF10: 8
PAINLEVEL_OUTOF10: 5
PAINLEVEL_OUTOF10: 7

## 2019-02-20 ASSESSMENT — PAIN DESCRIPTION - DESCRIPTORS
DESCRIPTORS: SHARP
DESCRIPTORS: SHARP

## 2019-02-20 ASSESSMENT — PAIN DESCRIPTION - LOCATION
LOCATION: LEG
LOCATION: LEG

## 2019-02-20 ASSESSMENT — PAIN DESCRIPTION - ORIENTATION
ORIENTATION: RIGHT
ORIENTATION: RIGHT

## 2019-02-20 ASSESSMENT — PAIN DESCRIPTION - FREQUENCY
FREQUENCY: INTERMITTENT
FREQUENCY: INTERMITTENT

## 2019-02-20 ASSESSMENT — PAIN DESCRIPTION - PAIN TYPE
TYPE: ACUTE PAIN
TYPE: ACUTE PAIN

## 2019-02-20 ASSESSMENT — PAIN - FUNCTIONAL ASSESSMENT
PAIN_FUNCTIONAL_ASSESSMENT: PREVENTS OR INTERFERES SOME ACTIVE ACTIVITIES AND ADLS
PAIN_FUNCTIONAL_ASSESSMENT: PREVENTS OR INTERFERES SOME ACTIVE ACTIVITIES AND ADLS

## 2019-02-21 LAB
GLUCOSE BLD-MCNC: 105 MG/DL (ref 70–108)
GLUCOSE BLD-MCNC: 124 MG/DL (ref 70–108)
GLUCOSE BLD-MCNC: 171 MG/DL (ref 70–108)
GLUCOSE BLD-MCNC: 94 MG/DL (ref 70–108)

## 2019-02-21 PROCEDURE — 82948 REAGENT STRIP/BLOOD GLUCOSE: CPT

## 2019-02-21 PROCEDURE — 2709999900 HC NON-CHARGEABLE SUPPLY

## 2019-02-21 PROCEDURE — 6370000000 HC RX 637 (ALT 250 FOR IP): Performed by: INTERNAL MEDICINE

## 2019-02-21 PROCEDURE — 97166 OT EVAL MOD COMPLEX 45 MIN: CPT

## 2019-02-21 PROCEDURE — 97162 PT EVAL MOD COMPLEX 30 MIN: CPT

## 2019-02-21 PROCEDURE — 6360000002 HC RX W HCPCS: Performed by: INTERNAL MEDICINE

## 2019-02-21 PROCEDURE — 97530 THERAPEUTIC ACTIVITIES: CPT

## 2019-02-21 PROCEDURE — 6370000000 HC RX 637 (ALT 250 FOR IP): Performed by: FAMILY MEDICINE

## 2019-02-21 PROCEDURE — 2580000003 HC RX 258: Performed by: INTERNAL MEDICINE

## 2019-02-21 PROCEDURE — 1290000000 HC SEMI PRIVATE OTHER R&B

## 2019-02-21 PROCEDURE — 97110 THERAPEUTIC EXERCISES: CPT

## 2019-02-21 RX ADMIN — INSULIN LISPRO 20 UNITS: 100 INJECTION, SOLUTION INTRAVENOUS; SUBCUTANEOUS at 08:51

## 2019-02-21 RX ADMIN — ACETAMINOPHEN 650 MG: 325 TABLET ORAL at 12:29

## 2019-02-21 RX ADMIN — GUAIFENESIN 600 MG: 600 TABLET, EXTENDED RELEASE ORAL at 09:18

## 2019-02-21 RX ADMIN — SERTRALINE 150 MG: 100 TABLET, FILM COATED ORAL at 09:18

## 2019-02-21 RX ADMIN — ACETAMINOPHEN 650 MG: 325 TABLET ORAL at 17:23

## 2019-02-21 RX ADMIN — Medication 10 ML: at 09:19

## 2019-02-21 RX ADMIN — CEFTRIAXONE SODIUM 2 G: 2 INJECTION, POWDER, FOR SOLUTION INTRAMUSCULAR; INTRAVENOUS at 11:40

## 2019-02-21 RX ADMIN — INSULIN LISPRO 20 UNITS: 100 INJECTION, SOLUTION INTRAVENOUS; SUBCUTANEOUS at 12:31

## 2019-02-21 RX ADMIN — INSULIN GLARGINE 40 UNITS: 100 INJECTION, SOLUTION SUBCUTANEOUS at 21:09

## 2019-02-21 RX ADMIN — INSULIN LISPRO 1 UNITS: 100 INJECTION, SOLUTION INTRAVENOUS; SUBCUTANEOUS at 21:08

## 2019-02-21 RX ADMIN — FAMOTIDINE 20 MG: 20 TABLET ORAL at 09:18

## 2019-02-21 RX ADMIN — INSULIN LISPRO 20 UNITS: 100 INJECTION, SOLUTION INTRAVENOUS; SUBCUTANEOUS at 16:54

## 2019-02-21 RX ADMIN — GUAIFENESIN 600 MG: 600 TABLET, EXTENDED RELEASE ORAL at 21:08

## 2019-02-21 RX ADMIN — LISINOPRIL 10 MG: 10 TABLET ORAL at 09:18

## 2019-02-21 RX ADMIN — ENOXAPARIN SODIUM 40 MG: 40 INJECTION SUBCUTANEOUS at 09:18

## 2019-02-21 ASSESSMENT — PAIN SCALES - GENERAL
PAINLEVEL_OUTOF10: 5
PAINLEVEL_OUTOF10: 8
PAINLEVEL_OUTOF10: 8

## 2019-02-21 ASSESSMENT — PAIN DESCRIPTION - PROGRESSION
CLINICAL_PROGRESSION: NOT CHANGED

## 2019-02-21 ASSESSMENT — PAIN DESCRIPTION - LOCATION: LOCATION: LEG

## 2019-02-21 ASSESSMENT — PAIN DESCRIPTION - ORIENTATION: ORIENTATION: RIGHT

## 2019-02-22 LAB
GLUCOSE BLD-MCNC: 119 MG/DL (ref 70–108)
GLUCOSE BLD-MCNC: 137 MG/DL (ref 70–108)
GLUCOSE BLD-MCNC: 172 MG/DL (ref 70–108)
GLUCOSE BLD-MCNC: 89 MG/DL (ref 70–108)

## 2019-02-22 PROCEDURE — 97110 THERAPEUTIC EXERCISES: CPT

## 2019-02-22 PROCEDURE — 97530 THERAPEUTIC ACTIVITIES: CPT

## 2019-02-22 PROCEDURE — 2709999900 HC NON-CHARGEABLE SUPPLY

## 2019-02-22 PROCEDURE — 1290000000 HC SEMI PRIVATE OTHER R&B

## 2019-02-22 PROCEDURE — 6360000002 HC RX W HCPCS: Performed by: INTERNAL MEDICINE

## 2019-02-22 PROCEDURE — 6370000000 HC RX 637 (ALT 250 FOR IP): Performed by: INTERNAL MEDICINE

## 2019-02-22 PROCEDURE — 2580000003 HC RX 258: Performed by: INTERNAL MEDICINE

## 2019-02-22 PROCEDURE — 6370000000 HC RX 637 (ALT 250 FOR IP): Performed by: FAMILY MEDICINE

## 2019-02-22 PROCEDURE — 82948 REAGENT STRIP/BLOOD GLUCOSE: CPT

## 2019-02-22 PROCEDURE — 97542 WHEELCHAIR MNGMENT TRAINING: CPT

## 2019-02-22 RX ORDER — SODIUM CHLORIDE 0.9 % (FLUSH) 0.9 %
10 SYRINGE (ML) INJECTION PRN
Status: DISCONTINUED | OUTPATIENT
Start: 2019-02-22 | End: 2019-03-12 | Stop reason: HOSPADM

## 2019-02-22 RX ORDER — SODIUM CHLORIDE 0.9 % (FLUSH) 0.9 %
10 SYRINGE (ML) INJECTION EVERY 12 HOURS SCHEDULED
Status: DISCONTINUED | OUTPATIENT
Start: 2019-02-22 | End: 2019-03-12 | Stop reason: HOSPADM

## 2019-02-22 RX ADMIN — FAMOTIDINE 20 MG: 20 TABLET ORAL at 08:52

## 2019-02-22 RX ADMIN — INSULIN LISPRO 20 UNITS: 100 INJECTION, SOLUTION INTRAVENOUS; SUBCUTANEOUS at 13:45

## 2019-02-22 RX ADMIN — SERTRALINE 150 MG: 100 TABLET, FILM COATED ORAL at 08:51

## 2019-02-22 RX ADMIN — GUAIFENESIN 600 MG: 600 TABLET, EXTENDED RELEASE ORAL at 21:42

## 2019-02-22 RX ADMIN — CEFTRIAXONE SODIUM 2 G: 2 INJECTION, POWDER, FOR SOLUTION INTRAMUSCULAR; INTRAVENOUS at 12:08

## 2019-02-22 RX ADMIN — MIRTAZAPINE 15 MG: 15 TABLET, FILM COATED ORAL at 21:42

## 2019-02-22 RX ADMIN — INSULIN LISPRO 20 UNITS: 100 INJECTION, SOLUTION INTRAVENOUS; SUBCUTANEOUS at 08:52

## 2019-02-22 RX ADMIN — LISINOPRIL 10 MG: 10 TABLET ORAL at 08:51

## 2019-02-22 RX ADMIN — INSULIN LISPRO 20 UNITS: 100 INJECTION, SOLUTION INTRAVENOUS; SUBCUTANEOUS at 17:44

## 2019-02-22 RX ADMIN — ENOXAPARIN SODIUM 40 MG: 40 INJECTION SUBCUTANEOUS at 08:52

## 2019-02-22 RX ADMIN — Medication 10 ML: at 22:43

## 2019-02-22 RX ADMIN — INSULIN GLARGINE 40 UNITS: 100 INJECTION, SOLUTION SUBCUTANEOUS at 21:43

## 2019-02-22 RX ADMIN — Medication 10 ML: at 12:09

## 2019-02-22 RX ADMIN — INSULIN LISPRO 1 UNITS: 100 INJECTION, SOLUTION INTRAVENOUS; SUBCUTANEOUS at 21:42

## 2019-02-22 RX ADMIN — Medication 10 ML: at 12:47

## 2019-02-22 RX ADMIN — GUAIFENESIN 600 MG: 600 TABLET, EXTENDED RELEASE ORAL at 08:52

## 2019-02-22 ASSESSMENT — PAIN DESCRIPTION - PROGRESSION
CLINICAL_PROGRESSION: NOT CHANGED

## 2019-02-22 ASSESSMENT — PAIN SCALES - GENERAL: PAINLEVEL_OUTOF10: 0

## 2019-02-23 LAB
AEROBIC CULTURE: NORMAL
ANAEROBIC CULTURE: NORMAL
GLUCOSE BLD-MCNC: 116 MG/DL (ref 70–108)
GLUCOSE BLD-MCNC: 147 MG/DL (ref 70–108)
GLUCOSE BLD-MCNC: 160 MG/DL (ref 70–108)
GLUCOSE BLD-MCNC: 183 MG/DL (ref 70–108)
GLUCOSE BLD-MCNC: 81 MG/DL (ref 70–108)
GRAM STAIN RESULT: NORMAL

## 2019-02-23 PROCEDURE — 6360000002 HC RX W HCPCS: Performed by: INTERNAL MEDICINE

## 2019-02-23 PROCEDURE — 6370000000 HC RX 637 (ALT 250 FOR IP): Performed by: FAMILY MEDICINE

## 2019-02-23 PROCEDURE — 2580000003 HC RX 258: Performed by: INTERNAL MEDICINE

## 2019-02-23 PROCEDURE — 6370000000 HC RX 637 (ALT 250 FOR IP): Performed by: INTERNAL MEDICINE

## 2019-02-23 PROCEDURE — 82948 REAGENT STRIP/BLOOD GLUCOSE: CPT

## 2019-02-23 PROCEDURE — 1290000000 HC SEMI PRIVATE OTHER R&B

## 2019-02-23 RX ADMIN — Medication 2 UNITS: at 13:41

## 2019-02-23 RX ADMIN — Medication 15 UNITS: at 18:39

## 2019-02-23 RX ADMIN — GUAIFENESIN 600 MG: 600 TABLET, EXTENDED RELEASE ORAL at 10:16

## 2019-02-23 RX ADMIN — SERTRALINE 150 MG: 100 TABLET, FILM COATED ORAL at 10:15

## 2019-02-23 RX ADMIN — LISINOPRIL 10 MG: 10 TABLET ORAL at 10:15

## 2019-02-23 RX ADMIN — Medication 10 ML: at 10:23

## 2019-02-23 RX ADMIN — FAMOTIDINE 20 MG: 20 TABLET ORAL at 10:16

## 2019-02-23 RX ADMIN — INSULIN LISPRO 20 UNITS: 100 INJECTION, SOLUTION INTRAVENOUS; SUBCUTANEOUS at 10:16

## 2019-02-23 RX ADMIN — INSULIN LISPRO 20 UNITS: 100 INJECTION, SOLUTION INTRAVENOUS; SUBCUTANEOUS at 13:38

## 2019-02-23 RX ADMIN — Medication 10 ML: at 21:04

## 2019-02-23 RX ADMIN — ENOXAPARIN SODIUM 40 MG: 40 INJECTION SUBCUTANEOUS at 10:16

## 2019-02-23 RX ADMIN — INSULIN GLARGINE 40 UNITS: 100 INJECTION, SOLUTION SUBCUTANEOUS at 21:01

## 2019-02-23 RX ADMIN — MIRTAZAPINE 15 MG: 15 TABLET, FILM COATED ORAL at 21:00

## 2019-02-23 RX ADMIN — CEFTRIAXONE SODIUM 2 G: 2 INJECTION, POWDER, FOR SOLUTION INTRAMUSCULAR; INTRAVENOUS at 10:16

## 2019-02-23 ASSESSMENT — PAIN SCALES - GENERAL
PAINLEVEL_OUTOF10: 0

## 2019-02-24 LAB
ANION GAP SERPL CALCULATED.3IONS-SCNC: 14 MEQ/L (ref 8–16)
ANISOCYTOSIS: PRESENT
BASOPHILIA: ABNORMAL
BASOPHILS # BLD: 0 %
BASOPHILS ABSOLUTE: 0 THOU/MM3 (ref 0–0.1)
BUN BLDV-MCNC: 15 MG/DL (ref 7–22)
CALCIUM SERPL-MCNC: 9.4 MG/DL (ref 8.5–10.5)
CHLORIDE BLD-SCNC: 100 MEQ/L (ref 98–111)
CO2: 24 MEQ/L (ref 23–33)
CREAT SERPL-MCNC: 0.7 MG/DL (ref 0.4–1.2)
DIFFERENTIAL, MANUAL: NORMAL
EOSINOPHIL # BLD: 1 %
EOSINOPHILS ABSOLUTE: 0.1 THOU/MM3 (ref 0–0.4)
ERYTHROCYTE [DISTWIDTH] IN BLOOD BY AUTOMATED COUNT: 14.9 % (ref 11.5–14.5)
ERYTHROCYTE [DISTWIDTH] IN BLOOD BY AUTOMATED COUNT: 46.7 FL (ref 35–45)
GFR SERPL CREATININE-BSD FRML MDRD: > 90 ML/MIN/1.73M2
GLUCOSE BLD-MCNC: 124 MG/DL (ref 70–108)
GLUCOSE BLD-MCNC: 184 MG/DL (ref 70–108)
GLUCOSE BLD-MCNC: 190 MG/DL (ref 70–108)
GLUCOSE BLD-MCNC: 217 MG/DL (ref 70–108)
GLUCOSE BLD-MCNC: 95 MG/DL (ref 70–108)
HCT VFR BLD CALC: 38.7 % (ref 42–52)
HEMOGLOBIN: 12.5 GM/DL (ref 14–18)
LYMPHOCYTES # BLD: 28 %
LYMPHOCYTES ABSOLUTE: 4.1 THOU/MM3 (ref 1–4.8)
MCH RBC QN AUTO: 28.6 PG (ref 26–33)
MCHC RBC AUTO-ENTMCNC: 32.3 GM/DL (ref 32.2–35.5)
MCV RBC AUTO: 88.6 FL (ref 80–94)
MONOCYTES # BLD: 3 %
MONOCYTES ABSOLUTE: 0.4 THOU/MM3 (ref 0.4–1.3)
MYELOCYTES: 3 %
NUCLEATED RED BLOOD CELLS: 0 /100 WBC
PLATELET # BLD: 447 THOU/MM3 (ref 130–400)
PLATELET ESTIMATE: ABNORMAL
PMV BLD AUTO: 9 FL (ref 9.4–12.4)
POTASSIUM SERPL-SCNC: 4.5 MEQ/L (ref 3.5–5.2)
PROMYELOCYTES: 1 %
RBC # BLD: 4.37 MILL/MM3 (ref 4.7–6.1)
SEG NEUTROPHILS: 64 %
SEGMENTED NEUTROPHILS ABSOLUTE COUNT: 9.3 THOU/MM3 (ref 1.8–7.7)
SODIUM BLD-SCNC: 138 MEQ/L (ref 135–145)
WBC # BLD: 14.5 THOU/MM3 (ref 4.8–10.8)

## 2019-02-24 PROCEDURE — 6370000000 HC RX 637 (ALT 250 FOR IP): Performed by: INTERNAL MEDICINE

## 2019-02-24 PROCEDURE — 82948 REAGENT STRIP/BLOOD GLUCOSE: CPT

## 2019-02-24 PROCEDURE — 97110 THERAPEUTIC EXERCISES: CPT

## 2019-02-24 PROCEDURE — 2580000003 HC RX 258: Performed by: INTERNAL MEDICINE

## 2019-02-24 PROCEDURE — 6360000002 HC RX W HCPCS: Performed by: INTERNAL MEDICINE

## 2019-02-24 PROCEDURE — 1290000000 HC SEMI PRIVATE OTHER R&B

## 2019-02-24 PROCEDURE — 6370000000 HC RX 637 (ALT 250 FOR IP): Performed by: FAMILY MEDICINE

## 2019-02-24 PROCEDURE — 97530 THERAPEUTIC ACTIVITIES: CPT

## 2019-02-24 PROCEDURE — 36415 COLL VENOUS BLD VENIPUNCTURE: CPT

## 2019-02-24 PROCEDURE — 80048 BASIC METABOLIC PNL TOTAL CA: CPT

## 2019-02-24 PROCEDURE — 85025 COMPLETE CBC W/AUTO DIFF WBC: CPT

## 2019-02-24 RX ADMIN — FAMOTIDINE 20 MG: 20 TABLET ORAL at 08:54

## 2019-02-24 RX ADMIN — ENOXAPARIN SODIUM 40 MG: 40 INJECTION SUBCUTANEOUS at 08:54

## 2019-02-24 RX ADMIN — INSULIN GLARGINE 40 UNITS: 100 INJECTION, SOLUTION SUBCUTANEOUS at 21:39

## 2019-02-24 RX ADMIN — MIRTAZAPINE 15 MG: 15 TABLET, FILM COATED ORAL at 21:38

## 2019-02-24 RX ADMIN — Medication 10 ML: at 21:38

## 2019-02-24 RX ADMIN — Medication 10 ML: at 08:55

## 2019-02-24 RX ADMIN — Medication 15 UNITS: at 08:57

## 2019-02-24 RX ADMIN — LISINOPRIL 10 MG: 10 TABLET ORAL at 08:54

## 2019-02-24 RX ADMIN — Medication 15 UNITS: at 12:26

## 2019-02-24 RX ADMIN — CEFTRIAXONE SODIUM 2 G: 2 INJECTION, POWDER, FOR SOLUTION INTRAMUSCULAR; INTRAVENOUS at 09:07

## 2019-02-24 RX ADMIN — SERTRALINE 150 MG: 100 TABLET, FILM COATED ORAL at 08:54

## 2019-02-24 RX ADMIN — Medication 15 UNITS: at 17:37

## 2019-02-24 ASSESSMENT — PAIN SCALES - GENERAL
PAINLEVEL_OUTOF10: 0

## 2019-02-25 LAB
GLUCOSE BLD-MCNC: 136 MG/DL (ref 70–108)
GLUCOSE BLD-MCNC: 140 MG/DL (ref 70–108)
GLUCOSE BLD-MCNC: 153 MG/DL (ref 70–108)
GLUCOSE BLD-MCNC: 217 MG/DL (ref 70–108)

## 2019-02-25 PROCEDURE — 97530 THERAPEUTIC ACTIVITIES: CPT

## 2019-02-25 PROCEDURE — 05H533Z INSERTION OF INFUSION DEVICE INTO RIGHT SUBCLAVIAN VEIN, PERCUTANEOUS APPROACH: ICD-10-PCS | Performed by: FAMILY MEDICINE

## 2019-02-25 PROCEDURE — 6360000002 HC RX W HCPCS: Performed by: INTERNAL MEDICINE

## 2019-02-25 PROCEDURE — 2580000003 HC RX 258: Performed by: INTERNAL MEDICINE

## 2019-02-25 PROCEDURE — 97110 THERAPEUTIC EXERCISES: CPT

## 2019-02-25 PROCEDURE — 6370000000 HC RX 637 (ALT 250 FOR IP): Performed by: FAMILY MEDICINE

## 2019-02-25 PROCEDURE — 6370000000 HC RX 637 (ALT 250 FOR IP): Performed by: INTERNAL MEDICINE

## 2019-02-25 PROCEDURE — C1751 CATH, INF, PER/CENT/MIDLINE: HCPCS

## 2019-02-25 PROCEDURE — 97116 GAIT TRAINING THERAPY: CPT

## 2019-02-25 PROCEDURE — 1290000000 HC SEMI PRIVATE OTHER R&B

## 2019-02-25 PROCEDURE — 82948 REAGENT STRIP/BLOOD GLUCOSE: CPT

## 2019-02-25 PROCEDURE — 97535 SELF CARE MNGMENT TRAINING: CPT

## 2019-02-25 PROCEDURE — 2709999900 HC NON-CHARGEABLE SUPPLY

## 2019-02-25 PROCEDURE — 76937 US GUIDE VASCULAR ACCESS: CPT

## 2019-02-25 PROCEDURE — 36569 INSJ PICC 5 YR+ W/O IMAGING: CPT

## 2019-02-25 RX ADMIN — ENOXAPARIN SODIUM 40 MG: 40 INJECTION SUBCUTANEOUS at 09:01

## 2019-02-25 RX ADMIN — SENNOSIDES 8.6 MG: 8.6 TABLET, FILM COATED ORAL at 09:02

## 2019-02-25 RX ADMIN — INSULIN GLARGINE 40 UNITS: 100 INJECTION, SOLUTION SUBCUTANEOUS at 21:05

## 2019-02-25 RX ADMIN — MIRTAZAPINE 15 MG: 15 TABLET, FILM COATED ORAL at 21:10

## 2019-02-25 RX ADMIN — LISINOPRIL 10 MG: 10 TABLET ORAL at 09:02

## 2019-02-25 RX ADMIN — SERTRALINE 150 MG: 100 TABLET, FILM COATED ORAL at 09:02

## 2019-02-25 RX ADMIN — FAMOTIDINE 20 MG: 20 TABLET ORAL at 09:01

## 2019-02-25 RX ADMIN — Medication 15 UNITS: at 17:29

## 2019-02-25 RX ADMIN — Medication 10 ML: at 21:24

## 2019-02-25 RX ADMIN — CEFTRIAXONE SODIUM 2 G: 2 INJECTION, POWDER, FOR SOLUTION INTRAMUSCULAR; INTRAVENOUS at 10:49

## 2019-02-25 RX ADMIN — Medication 15 UNITS: at 12:30

## 2019-02-25 RX ADMIN — Medication 10 ML: at 09:16

## 2019-02-25 RX ADMIN — Medication 15 UNITS: at 09:07

## 2019-02-25 ASSESSMENT — PAIN SCALES - GENERAL
PAINLEVEL_OUTOF10: 0
PAINLEVEL_OUTOF10: 0

## 2019-02-26 LAB
GLUCOSE BLD-MCNC: 100 MG/DL (ref 70–108)
GLUCOSE BLD-MCNC: 132 MG/DL (ref 70–108)
GLUCOSE BLD-MCNC: 139 MG/DL (ref 70–108)
GLUCOSE BLD-MCNC: 337 MG/DL (ref 70–108)

## 2019-02-26 PROCEDURE — 2580000003 HC RX 258: Performed by: INTERNAL MEDICINE

## 2019-02-26 PROCEDURE — 6360000002 HC RX W HCPCS: Performed by: INTERNAL MEDICINE

## 2019-02-26 PROCEDURE — 97530 THERAPEUTIC ACTIVITIES: CPT

## 2019-02-26 PROCEDURE — 6370000000 HC RX 637 (ALT 250 FOR IP): Performed by: INTERNAL MEDICINE

## 2019-02-26 PROCEDURE — 6370000000 HC RX 637 (ALT 250 FOR IP): Performed by: FAMILY MEDICINE

## 2019-02-26 PROCEDURE — 97116 GAIT TRAINING THERAPY: CPT

## 2019-02-26 PROCEDURE — 97110 THERAPEUTIC EXERCISES: CPT

## 2019-02-26 PROCEDURE — 97542 WHEELCHAIR MNGMENT TRAINING: CPT

## 2019-02-26 PROCEDURE — 1290000000 HC SEMI PRIVATE OTHER R&B

## 2019-02-26 PROCEDURE — 82948 REAGENT STRIP/BLOOD GLUCOSE: CPT

## 2019-02-26 RX ORDER — ACETAMINOPHEN 325 MG/1
650 TABLET ORAL EVERY 6 HOURS PRN
Status: DISCONTINUED | OUTPATIENT
Start: 2019-02-26 | End: 2019-03-12 | Stop reason: HOSPADM

## 2019-02-26 RX ADMIN — FAMOTIDINE 20 MG: 20 TABLET ORAL at 08:28

## 2019-02-26 RX ADMIN — Medication 15 UNITS: at 08:28

## 2019-02-26 RX ADMIN — ENOXAPARIN SODIUM 40 MG: 40 INJECTION SUBCUTANEOUS at 08:27

## 2019-02-26 RX ADMIN — MIRTAZAPINE 15 MG: 15 TABLET, FILM COATED ORAL at 20:46

## 2019-02-26 RX ADMIN — SERTRALINE 150 MG: 100 TABLET, FILM COATED ORAL at 08:28

## 2019-02-26 RX ADMIN — INSULIN GLARGINE 40 UNITS: 100 INJECTION, SOLUTION SUBCUTANEOUS at 20:46

## 2019-02-26 RX ADMIN — Medication 15 UNITS: at 12:56

## 2019-02-26 RX ADMIN — CEFTRIAXONE SODIUM 2 G: 2 INJECTION, POWDER, FOR SOLUTION INTRAMUSCULAR; INTRAVENOUS at 11:27

## 2019-02-26 RX ADMIN — Medication 10 ML: at 08:27

## 2019-02-26 RX ADMIN — LISINOPRIL 10 MG: 10 TABLET ORAL at 08:28

## 2019-02-26 RX ADMIN — Medication 10 ML: at 20:46

## 2019-02-26 ASSESSMENT — PAIN SCALES - GENERAL
PAINLEVEL_OUTOF10: 0

## 2019-02-27 LAB
GLUCOSE BLD-MCNC: 100 MG/DL (ref 70–108)
GLUCOSE BLD-MCNC: 123 MG/DL (ref 70–108)
GLUCOSE BLD-MCNC: 172 MG/DL (ref 70–108)
GLUCOSE BLD-MCNC: 172 MG/DL (ref 70–108)

## 2019-02-27 PROCEDURE — 6360000002 HC RX W HCPCS: Performed by: INTERNAL MEDICINE

## 2019-02-27 PROCEDURE — 97110 THERAPEUTIC EXERCISES: CPT

## 2019-02-27 PROCEDURE — 97530 THERAPEUTIC ACTIVITIES: CPT

## 2019-02-27 PROCEDURE — 6370000000 HC RX 637 (ALT 250 FOR IP): Performed by: FAMILY MEDICINE

## 2019-02-27 PROCEDURE — 2580000003 HC RX 258: Performed by: INTERNAL MEDICINE

## 2019-02-27 PROCEDURE — 0220000000 HC SKILLED NURSING FACILITY

## 2019-02-27 PROCEDURE — 97116 GAIT TRAINING THERAPY: CPT

## 2019-02-27 PROCEDURE — 6370000000 HC RX 637 (ALT 250 FOR IP): Performed by: INTERNAL MEDICINE

## 2019-02-27 PROCEDURE — 1290000000 HC SEMI PRIVATE OTHER R&B

## 2019-02-27 PROCEDURE — 97535 SELF CARE MNGMENT TRAINING: CPT

## 2019-02-27 PROCEDURE — 82948 REAGENT STRIP/BLOOD GLUCOSE: CPT

## 2019-02-27 RX ADMIN — Medication 10 ML: at 08:53

## 2019-02-27 RX ADMIN — FAMOTIDINE 20 MG: 20 TABLET ORAL at 08:40

## 2019-02-27 RX ADMIN — CEFTRIAXONE SODIUM 2 G: 2 INJECTION, POWDER, FOR SOLUTION INTRAMUSCULAR; INTRAVENOUS at 10:50

## 2019-02-27 RX ADMIN — Medication 15 UNITS: at 11:36

## 2019-02-27 RX ADMIN — SERTRALINE 150 MG: 100 TABLET, FILM COATED ORAL at 08:38

## 2019-02-27 RX ADMIN — Medication 15 UNITS: at 08:41

## 2019-02-27 RX ADMIN — MIRTAZAPINE 15 MG: 15 TABLET, FILM COATED ORAL at 20:15

## 2019-02-27 RX ADMIN — INSULIN GLARGINE 40 UNITS: 100 INJECTION, SOLUTION SUBCUTANEOUS at 21:38

## 2019-02-27 RX ADMIN — LISINOPRIL 10 MG: 10 TABLET ORAL at 08:38

## 2019-02-27 RX ADMIN — ENOXAPARIN SODIUM 40 MG: 40 INJECTION SUBCUTANEOUS at 08:40

## 2019-02-27 RX ADMIN — Medication 15 UNITS: at 17:51

## 2019-02-27 RX ADMIN — Medication 10 ML: at 20:15

## 2019-02-27 ASSESSMENT — PAIN SCALES - GENERAL
PAINLEVEL_OUTOF10: 0

## 2019-02-28 ENCOUNTER — APPOINTMENT (OUTPATIENT)
Dept: INTERVENTIONAL RADIOLOGY/VASCULAR | Age: 49
DRG: 564 | End: 2019-02-28
Attending: FAMILY MEDICINE
Payer: MEDICARE

## 2019-02-28 LAB
GLUCOSE BLD-MCNC: 155 MG/DL (ref 70–108)
GLUCOSE BLD-MCNC: 183 MG/DL (ref 70–108)
GLUCOSE BLD-MCNC: 188 MG/DL (ref 70–108)
GLUCOSE BLD-MCNC: 218 MG/DL (ref 70–108)

## 2019-02-28 PROCEDURE — 02HV33Z INSERTION OF INFUSION DEVICE INTO SUPERIOR VENA CAVA, PERCUTANEOUS APPROACH: ICD-10-PCS | Performed by: FAMILY MEDICINE

## 2019-02-28 PROCEDURE — 97530 THERAPEUTIC ACTIVITIES: CPT

## 2019-02-28 PROCEDURE — 6370000000 HC RX 637 (ALT 250 FOR IP): Performed by: INTERNAL MEDICINE

## 2019-02-28 PROCEDURE — 97110 THERAPEUTIC EXERCISES: CPT

## 2019-02-28 PROCEDURE — 36568 INSJ PICC <5 YR W/O IMAGING: CPT

## 2019-02-28 PROCEDURE — 6370000000 HC RX 637 (ALT 250 FOR IP): Performed by: FAMILY MEDICINE

## 2019-02-28 PROCEDURE — 36569 INSJ PICC 5 YR+ W/O IMAGING: CPT

## 2019-02-28 PROCEDURE — 2580000003 HC RX 258: Performed by: INTERNAL MEDICINE

## 2019-02-28 PROCEDURE — 82948 REAGENT STRIP/BLOOD GLUCOSE: CPT

## 2019-02-28 PROCEDURE — C1751 CATH, INF, PER/CENT/MIDLINE: HCPCS

## 2019-02-28 PROCEDURE — 6360000002 HC RX W HCPCS: Performed by: INTERNAL MEDICINE

## 2019-02-28 PROCEDURE — 76937 US GUIDE VASCULAR ACCESS: CPT

## 2019-02-28 PROCEDURE — 93971 EXTREMITY STUDY: CPT

## 2019-02-28 PROCEDURE — 1290000000 HC SEMI PRIVATE OTHER R&B

## 2019-02-28 RX ORDER — SODIUM CHLORIDE 0.9 % (FLUSH) 0.9 %
10 SYRINGE (ML) INJECTION PRN
Status: DISCONTINUED | OUTPATIENT
Start: 2019-02-28 | End: 2019-02-28 | Stop reason: SDUPTHER

## 2019-02-28 RX ORDER — LIDOCAINE HYDROCHLORIDE 10 MG/ML
5 INJECTION, SOLUTION EPIDURAL; INFILTRATION; INTRACAUDAL; PERINEURAL ONCE
Status: DISCONTINUED | OUTPATIENT
Start: 2019-02-28 | End: 2019-03-02

## 2019-02-28 RX ORDER — SODIUM CHLORIDE 0.9 % (FLUSH) 0.9 %
10 SYRINGE (ML) INJECTION EVERY 12 HOURS SCHEDULED
Status: DISCONTINUED | OUTPATIENT
Start: 2019-02-28 | End: 2019-02-28 | Stop reason: SDUPTHER

## 2019-02-28 RX ADMIN — Medication 15 UNITS: at 08:53

## 2019-02-28 RX ADMIN — INSULIN GLARGINE 40 UNITS: 100 INJECTION, SOLUTION SUBCUTANEOUS at 21:48

## 2019-02-28 RX ADMIN — Medication 15 UNITS: at 12:40

## 2019-02-28 RX ADMIN — ENOXAPARIN SODIUM 40 MG: 40 INJECTION SUBCUTANEOUS at 09:21

## 2019-02-28 RX ADMIN — SERTRALINE 150 MG: 100 TABLET, FILM COATED ORAL at 09:21

## 2019-02-28 RX ADMIN — CEFTRIAXONE SODIUM 2 G: 2 INJECTION, POWDER, FOR SOLUTION INTRAMUSCULAR; INTRAVENOUS at 10:30

## 2019-02-28 RX ADMIN — FAMOTIDINE 20 MG: 20 TABLET ORAL at 09:21

## 2019-02-28 RX ADMIN — MIRTAZAPINE 15 MG: 15 TABLET, FILM COATED ORAL at 21:48

## 2019-02-28 RX ADMIN — Medication 10 ML: at 21:48

## 2019-02-28 RX ADMIN — Medication 15 UNITS: at 17:33

## 2019-02-28 RX ADMIN — Medication 10 ML: at 09:22

## 2019-02-28 RX ADMIN — LISINOPRIL 10 MG: 10 TABLET ORAL at 09:21

## 2019-02-28 ASSESSMENT — PAIN SCALES - GENERAL
PAINLEVEL_OUTOF10: 0

## 2019-03-01 LAB
GLUCOSE BLD-MCNC: 180 MG/DL (ref 70–108)
GLUCOSE BLD-MCNC: 214 MG/DL (ref 70–108)
GLUCOSE BLD-MCNC: 257 MG/DL (ref 70–108)
GLUCOSE BLD-MCNC: 289 MG/DL (ref 70–108)

## 2019-03-01 PROCEDURE — 2580000003 HC RX 258: Performed by: INTERNAL MEDICINE

## 2019-03-01 PROCEDURE — 6360000002 HC RX W HCPCS: Performed by: INTERNAL MEDICINE

## 2019-03-01 PROCEDURE — 97110 THERAPEUTIC EXERCISES: CPT

## 2019-03-01 PROCEDURE — 97535 SELF CARE MNGMENT TRAINING: CPT

## 2019-03-01 PROCEDURE — 1290000000 HC SEMI PRIVATE OTHER R&B

## 2019-03-01 PROCEDURE — 6370000000 HC RX 637 (ALT 250 FOR IP): Performed by: INTERNAL MEDICINE

## 2019-03-01 PROCEDURE — 6370000000 HC RX 637 (ALT 250 FOR IP): Performed by: FAMILY MEDICINE

## 2019-03-01 PROCEDURE — 97530 THERAPEUTIC ACTIVITIES: CPT

## 2019-03-01 PROCEDURE — 82948 REAGENT STRIP/BLOOD GLUCOSE: CPT

## 2019-03-01 RX ADMIN — MIRTAZAPINE 15 MG: 15 TABLET, FILM COATED ORAL at 22:15

## 2019-03-01 RX ADMIN — SERTRALINE 150 MG: 100 TABLET, FILM COATED ORAL at 09:59

## 2019-03-01 RX ADMIN — INSULIN GLARGINE 40 UNITS: 100 INJECTION, SOLUTION SUBCUTANEOUS at 22:15

## 2019-03-01 RX ADMIN — Medication 10 ML: at 10:00

## 2019-03-01 RX ADMIN — CEFTRIAXONE SODIUM 2 G: 2 INJECTION, POWDER, FOR SOLUTION INTRAMUSCULAR; INTRAVENOUS at 10:10

## 2019-03-01 RX ADMIN — LISINOPRIL 10 MG: 10 TABLET ORAL at 09:59

## 2019-03-01 RX ADMIN — ENOXAPARIN SODIUM 40 MG: 40 INJECTION SUBCUTANEOUS at 09:59

## 2019-03-01 RX ADMIN — Medication 15 UNITS: at 17:20

## 2019-03-01 RX ADMIN — Medication 10 ML: at 22:15

## 2019-03-01 RX ADMIN — Medication 15 UNITS: at 12:19

## 2019-03-01 RX ADMIN — FAMOTIDINE 20 MG: 20 TABLET ORAL at 09:59

## 2019-03-01 RX ADMIN — Medication 15 UNITS: at 10:00

## 2019-03-01 ASSESSMENT — PAIN SCALES - GENERAL: PAINLEVEL_OUTOF10: 0

## 2019-03-02 LAB
GLUCOSE BLD-MCNC: 136 MG/DL (ref 70–108)
GLUCOSE BLD-MCNC: 193 MG/DL (ref 70–108)
GLUCOSE BLD-MCNC: 215 MG/DL (ref 70–108)

## 2019-03-02 PROCEDURE — 2580000003 HC RX 258: Performed by: INTERNAL MEDICINE

## 2019-03-02 PROCEDURE — 6360000002 HC RX W HCPCS: Performed by: INTERNAL MEDICINE

## 2019-03-02 PROCEDURE — 1290000000 HC SEMI PRIVATE OTHER R&B

## 2019-03-02 PROCEDURE — 82948 REAGENT STRIP/BLOOD GLUCOSE: CPT

## 2019-03-02 PROCEDURE — 6370000000 HC RX 637 (ALT 250 FOR IP): Performed by: FAMILY MEDICINE

## 2019-03-02 PROCEDURE — 6370000000 HC RX 637 (ALT 250 FOR IP): Performed by: INTERNAL MEDICINE

## 2019-03-02 RX ORDER — ONDANSETRON 4 MG/1
4 TABLET, FILM COATED ORAL EVERY 8 HOURS PRN
Status: DISCONTINUED | OUTPATIENT
Start: 2019-03-02 | End: 2019-03-12 | Stop reason: HOSPADM

## 2019-03-02 RX ORDER — INSULIN GLARGINE 100 [IU]/ML
44 INJECTION, SOLUTION SUBCUTANEOUS NIGHTLY
Status: DISCONTINUED | OUTPATIENT
Start: 2019-03-02 | End: 2019-03-10

## 2019-03-02 RX ADMIN — MIRTAZAPINE 15 MG: 15 TABLET, FILM COATED ORAL at 22:35

## 2019-03-02 RX ADMIN — Medication 10 ML: at 10:46

## 2019-03-02 RX ADMIN — SERTRALINE 150 MG: 100 TABLET, FILM COATED ORAL at 09:41

## 2019-03-02 RX ADMIN — Medication 15 UNITS: at 09:40

## 2019-03-02 RX ADMIN — Medication 15 UNITS: at 17:50

## 2019-03-02 RX ADMIN — Medication 10 ML: at 10:34

## 2019-03-02 RX ADMIN — ENOXAPARIN SODIUM 40 MG: 40 INJECTION SUBCUTANEOUS at 09:41

## 2019-03-02 RX ADMIN — FAMOTIDINE 20 MG: 20 TABLET ORAL at 09:41

## 2019-03-02 RX ADMIN — Medication 15 UNITS: at 12:46

## 2019-03-02 RX ADMIN — LISINOPRIL 10 MG: 10 TABLET ORAL at 09:41

## 2019-03-02 RX ADMIN — INSULIN GLARGINE 44 UNITS: 100 INJECTION, SOLUTION SUBCUTANEOUS at 22:35

## 2019-03-02 RX ADMIN — CEFTRIAXONE SODIUM 2 G: 2 INJECTION, POWDER, FOR SOLUTION INTRAMUSCULAR; INTRAVENOUS at 10:34

## 2019-03-02 RX ADMIN — Medication 10 ML: at 22:34

## 2019-03-02 ASSESSMENT — PAIN SCALES - GENERAL: PAINLEVEL_OUTOF10: 0

## 2019-03-03 PROBLEM — R77.8 ELEVATED TROPONIN: Status: RESOLVED | Noted: 2019-02-01 | Resolved: 2019-03-03

## 2019-03-03 PROBLEM — R79.89 ELEVATED TROPONIN: Status: RESOLVED | Noted: 2019-02-01 | Resolved: 2019-03-03

## 2019-03-03 LAB
ANION GAP SERPL CALCULATED.3IONS-SCNC: 12 MEQ/L (ref 8–16)
BASOPHILS # BLD: 0.9 %
BASOPHILS ABSOLUTE: 0.1 THOU/MM3 (ref 0–0.1)
BUN BLDV-MCNC: 19 MG/DL (ref 7–22)
CALCIUM SERPL-MCNC: 9.3 MG/DL (ref 8.5–10.5)
CHLORIDE BLD-SCNC: 100 MEQ/L (ref 98–111)
CO2: 24 MEQ/L (ref 23–33)
CREAT SERPL-MCNC: 0.7 MG/DL (ref 0.4–1.2)
EOSINOPHIL # BLD: 3.6 %
EOSINOPHILS ABSOLUTE: 0.3 THOU/MM3 (ref 0–0.4)
ERYTHROCYTE [DISTWIDTH] IN BLOOD BY AUTOMATED COUNT: 15.2 % (ref 11.5–14.5)
ERYTHROCYTE [DISTWIDTH] IN BLOOD BY AUTOMATED COUNT: 48.1 FL (ref 35–45)
GFR SERPL CREATININE-BSD FRML MDRD: > 90 ML/MIN/1.73M2
GLUCOSE BLD-MCNC: 168 MG/DL (ref 70–108)
GLUCOSE BLD-MCNC: 171 MG/DL (ref 70–108)
GLUCOSE BLD-MCNC: 184 MG/DL (ref 70–108)
GLUCOSE BLD-MCNC: 212 MG/DL (ref 70–108)
GLUCOSE BLD-MCNC: 213 MG/DL (ref 70–108)
HCT VFR BLD CALC: 37.3 % (ref 42–52)
HEMOGLOBIN: 12.4 GM/DL (ref 14–18)
IMMATURE GRANS (ABS): 0.04 THOU/MM3 (ref 0–0.07)
IMMATURE GRANULOCYTES: 0.5 %
LYMPHOCYTES # BLD: 29.3 %
LYMPHOCYTES ABSOLUTE: 2.5 THOU/MM3 (ref 1–4.8)
MCH RBC QN AUTO: 28.9 PG (ref 26–33)
MCHC RBC AUTO-ENTMCNC: 33.2 GM/DL (ref 32.2–35.5)
MCV RBC AUTO: 86.9 FL (ref 80–94)
MONOCYTES # BLD: 10.5 %
MONOCYTES ABSOLUTE: 0.9 THOU/MM3 (ref 0.4–1.3)
NUCLEATED RED BLOOD CELLS: 0 /100 WBC
PLATELET # BLD: 325 THOU/MM3 (ref 130–400)
PMV BLD AUTO: 9.2 FL (ref 9.4–12.4)
POTASSIUM SERPL-SCNC: 4.1 MEQ/L (ref 3.5–5.2)
RBC # BLD: 4.29 MILL/MM3 (ref 4.7–6.1)
SEG NEUTROPHILS: 55.2 %
SEGMENTED NEUTROPHILS ABSOLUTE COUNT: 4.7 THOU/MM3 (ref 1.8–7.7)
SODIUM BLD-SCNC: 136 MEQ/L (ref 135–145)
WBC # BLD: 8.5 THOU/MM3 (ref 4.8–10.8)

## 2019-03-03 PROCEDURE — 80048 BASIC METABOLIC PNL TOTAL CA: CPT

## 2019-03-03 PROCEDURE — 6360000002 HC RX W HCPCS: Performed by: INTERNAL MEDICINE

## 2019-03-03 PROCEDURE — 97110 THERAPEUTIC EXERCISES: CPT

## 2019-03-03 PROCEDURE — 6370000000 HC RX 637 (ALT 250 FOR IP): Performed by: FAMILY MEDICINE

## 2019-03-03 PROCEDURE — 36415 COLL VENOUS BLD VENIPUNCTURE: CPT

## 2019-03-03 PROCEDURE — 85025 COMPLETE CBC W/AUTO DIFF WBC: CPT

## 2019-03-03 PROCEDURE — 6370000000 HC RX 637 (ALT 250 FOR IP): Performed by: INTERNAL MEDICINE

## 2019-03-03 PROCEDURE — 82948 REAGENT STRIP/BLOOD GLUCOSE: CPT

## 2019-03-03 PROCEDURE — 1290000000 HC SEMI PRIVATE OTHER R&B

## 2019-03-03 PROCEDURE — 2580000003 HC RX 258: Performed by: INTERNAL MEDICINE

## 2019-03-03 RX ADMIN — Medication 10 ML: at 22:01

## 2019-03-03 RX ADMIN — SERTRALINE 150 MG: 100 TABLET, FILM COATED ORAL at 09:41

## 2019-03-03 RX ADMIN — Medication 15 UNITS: at 08:33

## 2019-03-03 RX ADMIN — MIRTAZAPINE 15 MG: 15 TABLET, FILM COATED ORAL at 22:00

## 2019-03-03 RX ADMIN — INSULIN GLARGINE 44 UNITS: 100 INJECTION, SOLUTION SUBCUTANEOUS at 22:01

## 2019-03-03 RX ADMIN — ENOXAPARIN SODIUM 40 MG: 40 INJECTION SUBCUTANEOUS at 09:42

## 2019-03-03 RX ADMIN — Medication 15 UNITS: at 17:29

## 2019-03-03 RX ADMIN — CEFTRIAXONE SODIUM 2 G: 2 INJECTION, POWDER, FOR SOLUTION INTRAMUSCULAR; INTRAVENOUS at 09:42

## 2019-03-03 RX ADMIN — Medication 10 ML: at 09:43

## 2019-03-03 RX ADMIN — Medication 15 UNITS: at 12:52

## 2019-03-03 RX ADMIN — FAMOTIDINE 20 MG: 20 TABLET ORAL at 09:41

## 2019-03-03 RX ADMIN — LISINOPRIL 10 MG: 10 TABLET ORAL at 09:41

## 2019-03-03 ASSESSMENT — PAIN SCALES - GENERAL
PAINLEVEL_OUTOF10: 0
PAINLEVEL_OUTOF10: 0

## 2019-03-04 LAB
GLUCOSE BLD-MCNC: 153 MG/DL (ref 70–108)
GLUCOSE BLD-MCNC: 192 MG/DL (ref 70–108)
GLUCOSE BLD-MCNC: 305 MG/DL (ref 70–108)

## 2019-03-04 PROCEDURE — 2580000003 HC RX 258: Performed by: INTERNAL MEDICINE

## 2019-03-04 PROCEDURE — 6370000000 HC RX 637 (ALT 250 FOR IP): Performed by: INTERNAL MEDICINE

## 2019-03-04 PROCEDURE — 6370000000 HC RX 637 (ALT 250 FOR IP): Performed by: FAMILY MEDICINE

## 2019-03-04 PROCEDURE — 97530 THERAPEUTIC ACTIVITIES: CPT

## 2019-03-04 PROCEDURE — 6360000002 HC RX W HCPCS: Performed by: INTERNAL MEDICINE

## 2019-03-04 PROCEDURE — 1290000000 HC SEMI PRIVATE OTHER R&B

## 2019-03-04 PROCEDURE — 82948 REAGENT STRIP/BLOOD GLUCOSE: CPT

## 2019-03-04 PROCEDURE — 97110 THERAPEUTIC EXERCISES: CPT

## 2019-03-04 RX ADMIN — Medication 10 ML: at 09:53

## 2019-03-04 RX ADMIN — Medication 15 UNITS: at 09:53

## 2019-03-04 RX ADMIN — ENOXAPARIN SODIUM 40 MG: 40 INJECTION SUBCUTANEOUS at 09:53

## 2019-03-04 RX ADMIN — ACETAMINOPHEN 650 MG: 325 TABLET ORAL at 09:53

## 2019-03-04 RX ADMIN — Medication 10 ML: at 20:22

## 2019-03-04 RX ADMIN — Medication 15 UNITS: at 17:51

## 2019-03-04 RX ADMIN — FAMOTIDINE 20 MG: 20 TABLET ORAL at 09:52

## 2019-03-04 RX ADMIN — Medication 15 UNITS: at 13:38

## 2019-03-04 RX ADMIN — SERTRALINE 150 MG: 100 TABLET, FILM COATED ORAL at 09:52

## 2019-03-04 RX ADMIN — CEFTRIAXONE SODIUM 2 G: 2 INJECTION, POWDER, FOR SOLUTION INTRAMUSCULAR; INTRAVENOUS at 09:53

## 2019-03-04 RX ADMIN — LISINOPRIL 10 MG: 10 TABLET ORAL at 09:52

## 2019-03-04 RX ADMIN — INSULIN GLARGINE 44 UNITS: 100 INJECTION, SOLUTION SUBCUTANEOUS at 20:30

## 2019-03-04 RX ADMIN — MIRTAZAPINE 15 MG: 15 TABLET, FILM COATED ORAL at 20:21

## 2019-03-04 ASSESSMENT — PAIN SCALES - GENERAL
PAINLEVEL_OUTOF10: 0
PAINLEVEL_OUTOF10: 0
PAINLEVEL_OUTOF10: 3

## 2019-03-05 LAB
GLUCOSE BLD-MCNC: 177 MG/DL (ref 70–108)
GLUCOSE BLD-MCNC: 180 MG/DL (ref 70–108)
GLUCOSE BLD-MCNC: 201 MG/DL (ref 70–108)
GLUCOSE BLD-MCNC: 275 MG/DL (ref 70–108)

## 2019-03-05 PROCEDURE — 6370000000 HC RX 637 (ALT 250 FOR IP): Performed by: INTERNAL MEDICINE

## 2019-03-05 PROCEDURE — 6360000002 HC RX W HCPCS: Performed by: INTERNAL MEDICINE

## 2019-03-05 PROCEDURE — 82948 REAGENT STRIP/BLOOD GLUCOSE: CPT

## 2019-03-05 PROCEDURE — 1290000000 HC SEMI PRIVATE OTHER R&B

## 2019-03-05 PROCEDURE — 2580000003 HC RX 258: Performed by: INTERNAL MEDICINE

## 2019-03-05 PROCEDURE — 0220000000 HC SKILLED NURSING FACILITY

## 2019-03-05 PROCEDURE — 6370000000 HC RX 637 (ALT 250 FOR IP): Performed by: FAMILY MEDICINE

## 2019-03-05 RX ADMIN — Medication 15 UNITS: at 11:59

## 2019-03-05 RX ADMIN — Medication 10 ML: at 21:58

## 2019-03-05 RX ADMIN — Medication 10 ML: at 09:43

## 2019-03-05 RX ADMIN — Medication 15 UNITS: at 09:36

## 2019-03-05 RX ADMIN — MIRTAZAPINE 15 MG: 15 TABLET, FILM COATED ORAL at 21:58

## 2019-03-05 RX ADMIN — SERTRALINE 150 MG: 100 TABLET, FILM COATED ORAL at 09:36

## 2019-03-05 RX ADMIN — FAMOTIDINE 20 MG: 20 TABLET ORAL at 09:36

## 2019-03-05 RX ADMIN — ENOXAPARIN SODIUM 40 MG: 40 INJECTION SUBCUTANEOUS at 09:36

## 2019-03-05 RX ADMIN — LISINOPRIL 10 MG: 10 TABLET ORAL at 09:36

## 2019-03-05 RX ADMIN — INSULIN GLARGINE 44 UNITS: 100 INJECTION, SOLUTION SUBCUTANEOUS at 21:45

## 2019-03-05 RX ADMIN — CEFTRIAXONE SODIUM 2 G: 2 INJECTION, POWDER, FOR SOLUTION INTRAMUSCULAR; INTRAVENOUS at 10:05

## 2019-03-05 RX ADMIN — Medication 15 UNITS: at 17:56

## 2019-03-05 ASSESSMENT — PAIN SCALES - GENERAL
PAINLEVEL_OUTOF10: 0

## 2019-03-06 LAB
GLUCOSE BLD-MCNC: 189 MG/DL (ref 70–108)
GLUCOSE BLD-MCNC: 205 MG/DL (ref 70–108)
GLUCOSE BLD-MCNC: 214 MG/DL (ref 70–108)
GLUCOSE BLD-MCNC: 245 MG/DL (ref 70–108)

## 2019-03-06 PROCEDURE — 1290000000 HC SEMI PRIVATE OTHER R&B

## 2019-03-06 PROCEDURE — 6360000002 HC RX W HCPCS: Performed by: INTERNAL MEDICINE

## 2019-03-06 PROCEDURE — 2580000003 HC RX 258: Performed by: INTERNAL MEDICINE

## 2019-03-06 PROCEDURE — 82948 REAGENT STRIP/BLOOD GLUCOSE: CPT

## 2019-03-06 PROCEDURE — 6370000000 HC RX 637 (ALT 250 FOR IP): Performed by: INTERNAL MEDICINE

## 2019-03-06 PROCEDURE — 6370000000 HC RX 637 (ALT 250 FOR IP): Performed by: FAMILY MEDICINE

## 2019-03-06 RX ADMIN — ENOXAPARIN SODIUM 40 MG: 40 INJECTION SUBCUTANEOUS at 09:25

## 2019-03-06 RX ADMIN — Medication 15 UNITS: at 09:26

## 2019-03-06 RX ADMIN — CEFTRIAXONE SODIUM 2 G: 2 INJECTION, POWDER, FOR SOLUTION INTRAMUSCULAR; INTRAVENOUS at 09:25

## 2019-03-06 RX ADMIN — MIRTAZAPINE 15 MG: 15 TABLET, FILM COATED ORAL at 20:27

## 2019-03-06 RX ADMIN — SERTRALINE 150 MG: 100 TABLET, FILM COATED ORAL at 09:25

## 2019-03-06 RX ADMIN — FAMOTIDINE 20 MG: 20 TABLET ORAL at 09:25

## 2019-03-06 RX ADMIN — Medication 10 ML: at 09:26

## 2019-03-06 RX ADMIN — Medication 15 UNITS: at 14:02

## 2019-03-06 RX ADMIN — Medication 15 UNITS: at 18:14

## 2019-03-06 RX ADMIN — Medication 10 ML: at 20:27

## 2019-03-06 RX ADMIN — INSULIN GLARGINE 44 UNITS: 100 INJECTION, SOLUTION SUBCUTANEOUS at 20:27

## 2019-03-06 RX ADMIN — LISINOPRIL 10 MG: 10 TABLET ORAL at 09:25

## 2019-03-06 ASSESSMENT — PAIN SCALES - GENERAL
PAINLEVEL_OUTOF10: 0
PAINLEVEL_OUTOF10: 0

## 2019-03-07 LAB
GLUCOSE BLD-MCNC: 160 MG/DL (ref 70–108)
GLUCOSE BLD-MCNC: 207 MG/DL (ref 70–108)
GLUCOSE BLD-MCNC: 265 MG/DL (ref 70–108)
GLUCOSE BLD-MCNC: 284 MG/DL (ref 70–108)

## 2019-03-07 PROCEDURE — 6360000002 HC RX W HCPCS: Performed by: INTERNAL MEDICINE

## 2019-03-07 PROCEDURE — 82948 REAGENT STRIP/BLOOD GLUCOSE: CPT

## 2019-03-07 PROCEDURE — 2580000003 HC RX 258: Performed by: INTERNAL MEDICINE

## 2019-03-07 PROCEDURE — 6370000000 HC RX 637 (ALT 250 FOR IP): Performed by: FAMILY MEDICINE

## 2019-03-07 PROCEDURE — 1290000000 HC SEMI PRIVATE OTHER R&B

## 2019-03-07 PROCEDURE — 6370000000 HC RX 637 (ALT 250 FOR IP): Performed by: INTERNAL MEDICINE

## 2019-03-07 RX ADMIN — CEFTRIAXONE SODIUM 2 G: 2 INJECTION, POWDER, FOR SOLUTION INTRAMUSCULAR; INTRAVENOUS at 10:45

## 2019-03-07 RX ADMIN — FAMOTIDINE 20 MG: 20 TABLET ORAL at 08:50

## 2019-03-07 RX ADMIN — Medication 10 ML: at 11:19

## 2019-03-07 RX ADMIN — LISINOPRIL 10 MG: 10 TABLET ORAL at 08:50

## 2019-03-07 RX ADMIN — ENOXAPARIN SODIUM 40 MG: 40 INJECTION SUBCUTANEOUS at 08:50

## 2019-03-07 RX ADMIN — MIRTAZAPINE 15 MG: 15 TABLET, FILM COATED ORAL at 20:20

## 2019-03-07 RX ADMIN — Medication 10 ML: at 20:21

## 2019-03-07 RX ADMIN — INSULIN GLARGINE 44 UNITS: 100 INJECTION, SOLUTION SUBCUTANEOUS at 20:21

## 2019-03-07 RX ADMIN — Medication 15 UNITS: at 17:37

## 2019-03-07 RX ADMIN — Medication 15 UNITS: at 12:52

## 2019-03-07 RX ADMIN — Medication 15 UNITS: at 08:50

## 2019-03-07 RX ADMIN — SERTRALINE 150 MG: 100 TABLET, FILM COATED ORAL at 08:50

## 2019-03-07 ASSESSMENT — PAIN SCALES - GENERAL
PAINLEVEL_OUTOF10: 0
PAINLEVEL_OUTOF10: 0

## 2019-03-08 LAB
GLUCOSE BLD-MCNC: 121 MG/DL (ref 70–108)
GLUCOSE BLD-MCNC: 157 MG/DL (ref 70–108)
GLUCOSE BLD-MCNC: 168 MG/DL (ref 70–108)
GLUCOSE BLD-MCNC: 234 MG/DL (ref 70–108)

## 2019-03-08 PROCEDURE — 1290000000 HC SEMI PRIVATE OTHER R&B

## 2019-03-08 PROCEDURE — 82948 REAGENT STRIP/BLOOD GLUCOSE: CPT

## 2019-03-08 PROCEDURE — 2580000003 HC RX 258: Performed by: INTERNAL MEDICINE

## 2019-03-08 PROCEDURE — 6360000002 HC RX W HCPCS: Performed by: INTERNAL MEDICINE

## 2019-03-08 PROCEDURE — 6370000000 HC RX 637 (ALT 250 FOR IP): Performed by: INTERNAL MEDICINE

## 2019-03-08 PROCEDURE — 6370000000 HC RX 637 (ALT 250 FOR IP): Performed by: FAMILY MEDICINE

## 2019-03-08 RX ADMIN — ENOXAPARIN SODIUM 40 MG: 40 INJECTION SUBCUTANEOUS at 10:33

## 2019-03-08 RX ADMIN — Medication 15 UNITS: at 17:44

## 2019-03-08 RX ADMIN — Medication 15 UNITS: at 12:49

## 2019-03-08 RX ADMIN — Medication 10 ML: at 21:36

## 2019-03-08 RX ADMIN — SERTRALINE 150 MG: 100 TABLET, FILM COATED ORAL at 10:34

## 2019-03-08 RX ADMIN — FAMOTIDINE 20 MG: 20 TABLET ORAL at 10:45

## 2019-03-08 RX ADMIN — CEFTRIAXONE SODIUM 2 G: 2 INJECTION, POWDER, FOR SOLUTION INTRAMUSCULAR; INTRAVENOUS at 10:33

## 2019-03-08 RX ADMIN — Medication 15 UNITS: at 10:32

## 2019-03-08 RX ADMIN — LISINOPRIL 10 MG: 10 TABLET ORAL at 10:34

## 2019-03-08 RX ADMIN — INSULIN GLARGINE 44 UNITS: 100 INJECTION, SOLUTION SUBCUTANEOUS at 21:44

## 2019-03-08 RX ADMIN — Medication 10 ML: at 10:34

## 2019-03-08 RX ADMIN — MIRTAZAPINE 15 MG: 15 TABLET, FILM COATED ORAL at 21:36

## 2019-03-08 ASSESSMENT — PAIN SCALES - GENERAL: PAINLEVEL_OUTOF10: 0

## 2019-03-09 LAB
GLUCOSE BLD-MCNC: 163 MG/DL (ref 70–108)
GLUCOSE BLD-MCNC: 189 MG/DL (ref 70–108)
GLUCOSE BLD-MCNC: 264 MG/DL (ref 70–108)

## 2019-03-09 PROCEDURE — 2580000003 HC RX 258: Performed by: INTERNAL MEDICINE

## 2019-03-09 PROCEDURE — 6370000000 HC RX 637 (ALT 250 FOR IP): Performed by: FAMILY MEDICINE

## 2019-03-09 PROCEDURE — 6370000000 HC RX 637 (ALT 250 FOR IP): Performed by: INTERNAL MEDICINE

## 2019-03-09 PROCEDURE — 6360000002 HC RX W HCPCS: Performed by: INTERNAL MEDICINE

## 2019-03-09 PROCEDURE — 82948 REAGENT STRIP/BLOOD GLUCOSE: CPT

## 2019-03-09 PROCEDURE — 1290000000 HC SEMI PRIVATE OTHER R&B

## 2019-03-09 RX ADMIN — Medication 15 UNITS: at 08:09

## 2019-03-09 RX ADMIN — MIRTAZAPINE 15 MG: 15 TABLET, FILM COATED ORAL at 21:10

## 2019-03-09 RX ADMIN — Medication 10 ML: at 12:20

## 2019-03-09 RX ADMIN — CEFTRIAXONE SODIUM 2 G: 2 INJECTION, POWDER, FOR SOLUTION INTRAMUSCULAR; INTRAVENOUS at 12:20

## 2019-03-09 RX ADMIN — LISINOPRIL 10 MG: 10 TABLET ORAL at 08:03

## 2019-03-09 RX ADMIN — Medication 15 UNITS: at 17:55

## 2019-03-09 RX ADMIN — Medication 10 ML: at 21:40

## 2019-03-09 RX ADMIN — INSULIN GLARGINE 44 UNITS: 100 INJECTION, SOLUTION SUBCUTANEOUS at 21:11

## 2019-03-09 RX ADMIN — FAMOTIDINE 20 MG: 20 TABLET ORAL at 08:03

## 2019-03-09 RX ADMIN — Medication 15 UNITS: at 12:27

## 2019-03-09 RX ADMIN — SERTRALINE 150 MG: 100 TABLET, FILM COATED ORAL at 08:02

## 2019-03-09 RX ADMIN — ENOXAPARIN SODIUM 40 MG: 40 INJECTION SUBCUTANEOUS at 08:08

## 2019-03-09 ASSESSMENT — PAIN SCALES - GENERAL: PAINLEVEL_OUTOF10: 0

## 2019-03-10 LAB
GLUCOSE BLD-MCNC: 159 MG/DL (ref 70–108)
GLUCOSE BLD-MCNC: 193 MG/DL (ref 70–108)
GLUCOSE BLD-MCNC: 204 MG/DL (ref 70–108)
GLUCOSE BLD-MCNC: 310 MG/DL (ref 70–108)
GLUCOSE BLD-MCNC: 349 MG/DL (ref 70–108)
GLUCOSE BLD-MCNC: 385 MG/DL (ref 70–108)

## 2019-03-10 PROCEDURE — 6370000000 HC RX 637 (ALT 250 FOR IP): Performed by: FAMILY MEDICINE

## 2019-03-10 PROCEDURE — 6360000002 HC RX W HCPCS: Performed by: INTERNAL MEDICINE

## 2019-03-10 PROCEDURE — 6370000000 HC RX 637 (ALT 250 FOR IP): Performed by: INTERNAL MEDICINE

## 2019-03-10 PROCEDURE — 2580000003 HC RX 258: Performed by: INTERNAL MEDICINE

## 2019-03-10 PROCEDURE — 1290000000 HC SEMI PRIVATE OTHER R&B

## 2019-03-10 PROCEDURE — 82948 REAGENT STRIP/BLOOD GLUCOSE: CPT

## 2019-03-10 RX ORDER — LOPERAMIDE HYDROCHLORIDE 2 MG/1
2 CAPSULE ORAL 4 TIMES DAILY PRN
Status: DISCONTINUED | OUTPATIENT
Start: 2019-03-10 | End: 2019-03-12 | Stop reason: HOSPADM

## 2019-03-10 RX ORDER — INSULIN GLARGINE 100 [IU]/ML
48 INJECTION, SOLUTION SUBCUTANEOUS NIGHTLY
Status: DISCONTINUED | OUTPATIENT
Start: 2019-03-10 | End: 2019-03-12 | Stop reason: HOSPADM

## 2019-03-10 RX ADMIN — MIRTAZAPINE 15 MG: 15 TABLET, FILM COATED ORAL at 21:46

## 2019-03-10 RX ADMIN — FAMOTIDINE 20 MG: 20 TABLET ORAL at 08:30

## 2019-03-10 RX ADMIN — Medication 15 UNITS: at 13:02

## 2019-03-10 RX ADMIN — Medication 15 UNITS: at 17:49

## 2019-03-10 RX ADMIN — ENOXAPARIN SODIUM 40 MG: 40 INJECTION SUBCUTANEOUS at 08:30

## 2019-03-10 RX ADMIN — Medication 15 UNITS: at 08:30

## 2019-03-10 RX ADMIN — SERTRALINE 150 MG: 100 TABLET, FILM COATED ORAL at 08:29

## 2019-03-10 RX ADMIN — CEFTRIAXONE SODIUM 2 G: 2 INJECTION, POWDER, FOR SOLUTION INTRAMUSCULAR; INTRAVENOUS at 11:33

## 2019-03-10 RX ADMIN — LISINOPRIL 10 MG: 10 TABLET ORAL at 08:30

## 2019-03-10 RX ADMIN — INSULIN GLARGINE 48 UNITS: 100 INJECTION, SOLUTION SUBCUTANEOUS at 21:47

## 2019-03-10 RX ADMIN — Medication 10 ML: at 22:36

## 2019-03-10 RX ADMIN — Medication 10 ML: at 08:32

## 2019-03-10 ASSESSMENT — PAIN SCALES - GENERAL
PAINLEVEL_OUTOF10: 0
PAINLEVEL_OUTOF10: 0

## 2019-03-11 LAB
GLUCOSE BLD-MCNC: 153 MG/DL (ref 70–108)
GLUCOSE BLD-MCNC: 213 MG/DL (ref 70–108)
GLUCOSE BLD-MCNC: 217 MG/DL (ref 70–108)
GLUCOSE BLD-MCNC: 229 MG/DL (ref 70–108)

## 2019-03-11 PROCEDURE — 6370000000 HC RX 637 (ALT 250 FOR IP): Performed by: INTERNAL MEDICINE

## 2019-03-11 PROCEDURE — 6360000002 HC RX W HCPCS: Performed by: INTERNAL MEDICINE

## 2019-03-11 PROCEDURE — 6370000000 HC RX 637 (ALT 250 FOR IP): Performed by: FAMILY MEDICINE

## 2019-03-11 PROCEDURE — 2580000003 HC RX 258: Performed by: INTERNAL MEDICINE

## 2019-03-11 PROCEDURE — 1290000000 HC SEMI PRIVATE OTHER R&B

## 2019-03-11 PROCEDURE — 82948 REAGENT STRIP/BLOOD GLUCOSE: CPT

## 2019-03-11 RX ORDER — AMOXICILLIN 500 MG/1
500 CAPSULE ORAL EVERY 8 HOURS SCHEDULED
Status: DISCONTINUED | OUTPATIENT
Start: 2019-03-11 | End: 2019-03-12 | Stop reason: HOSPADM

## 2019-03-11 RX ADMIN — ENOXAPARIN SODIUM 40 MG: 40 INJECTION SUBCUTANEOUS at 09:02

## 2019-03-11 RX ADMIN — MIRTAZAPINE 15 MG: 15 TABLET, FILM COATED ORAL at 21:28

## 2019-03-11 RX ADMIN — Medication 15 UNITS: at 09:05

## 2019-03-11 RX ADMIN — Medication 10 ML: at 09:03

## 2019-03-11 RX ADMIN — CEFTRIAXONE SODIUM 2 G: 2 INJECTION, POWDER, FOR SOLUTION INTRAMUSCULAR; INTRAVENOUS at 14:38

## 2019-03-11 RX ADMIN — SERTRALINE 150 MG: 100 TABLET, FILM COATED ORAL at 09:03

## 2019-03-11 RX ADMIN — INSULIN GLARGINE 48 UNITS: 100 INJECTION, SOLUTION SUBCUTANEOUS at 21:34

## 2019-03-11 RX ADMIN — LISINOPRIL 10 MG: 10 TABLET ORAL at 09:03

## 2019-03-11 RX ADMIN — AMOXICILLIN 500 MG: 500 CAPSULE ORAL at 21:28

## 2019-03-11 RX ADMIN — Medication 15 UNITS: at 18:34

## 2019-03-11 RX ADMIN — FAMOTIDINE 20 MG: 20 TABLET ORAL at 09:03

## 2019-03-11 RX ADMIN — Medication 15 UNITS: at 12:29

## 2019-03-11 ASSESSMENT — PAIN SCALES - GENERAL: PAINLEVEL_OUTOF10: 0

## 2019-03-12 ENCOUNTER — TELEPHONE (OUTPATIENT)
Dept: FAMILY MEDICINE CLINIC | Age: 49
End: 2019-03-12

## 2019-03-12 VITALS
SYSTOLIC BLOOD PRESSURE: 128 MMHG | BODY MASS INDEX: 35.69 KG/M2 | WEIGHT: 222.1 LBS | RESPIRATION RATE: 18 BRPM | OXYGEN SATURATION: 99 % | TEMPERATURE: 98.3 F | HEART RATE: 91 BPM | HEIGHT: 66 IN | DIASTOLIC BLOOD PRESSURE: 69 MMHG

## 2019-03-12 LAB
GLUCOSE BLD-MCNC: 158 MG/DL (ref 70–108)
GLUCOSE BLD-MCNC: 209 MG/DL (ref 70–108)

## 2019-03-12 PROCEDURE — 6360000002 HC RX W HCPCS: Performed by: INTERNAL MEDICINE

## 2019-03-12 PROCEDURE — 6370000000 HC RX 637 (ALT 250 FOR IP): Performed by: INTERNAL MEDICINE

## 2019-03-12 PROCEDURE — 6370000000 HC RX 637 (ALT 250 FOR IP): Performed by: FAMILY MEDICINE

## 2019-03-12 PROCEDURE — 82948 REAGENT STRIP/BLOOD GLUCOSE: CPT

## 2019-03-12 RX ORDER — AMOXICILLIN 500 MG/1
500 CAPSULE ORAL EVERY 8 HOURS SCHEDULED
Qty: 39 CAPSULE | Refills: 0 | Status: SHIPPED | OUTPATIENT
Start: 2019-03-12 | End: 2019-03-25

## 2019-03-12 RX ORDER — LISINOPRIL 10 MG/1
10 TABLET ORAL DAILY
Qty: 10 TABLET | Refills: 0 | Status: SHIPPED | OUTPATIENT
Start: 2019-03-12 | End: 2019-04-01 | Stop reason: ALTCHOICE

## 2019-03-12 RX ADMIN — AMOXICILLIN 500 MG: 500 CAPSULE ORAL at 13:39

## 2019-03-12 RX ADMIN — FAMOTIDINE 20 MG: 20 TABLET ORAL at 10:33

## 2019-03-12 RX ADMIN — Medication 15 UNITS: at 10:33

## 2019-03-12 RX ADMIN — ENOXAPARIN SODIUM 40 MG: 40 INJECTION SUBCUTANEOUS at 10:33

## 2019-03-12 RX ADMIN — SERTRALINE 150 MG: 100 TABLET, FILM COATED ORAL at 10:33

## 2019-03-12 RX ADMIN — Medication 15 UNITS: at 13:36

## 2019-03-12 RX ADMIN — AMOXICILLIN 500 MG: 500 CAPSULE ORAL at 06:48

## 2019-03-12 RX ADMIN — LISINOPRIL 10 MG: 10 TABLET ORAL at 10:33

## 2019-03-12 ASSESSMENT — PAIN SCALES - GENERAL
PAINLEVEL_OUTOF10: 0
PAINLEVEL_OUTOF10: 0

## 2019-03-27 ENCOUNTER — HOSPITAL ENCOUNTER (OUTPATIENT)
Dept: WOUND CARE | Age: 49
Discharge: HOME | End: 2019-03-27
Payer: MEDICARE

## 2019-03-27 VITALS
RESPIRATION RATE: 18 BRPM | OXYGEN SATURATION: 100 % | SYSTOLIC BLOOD PRESSURE: 138 MMHG | HEART RATE: 99 BPM | DIASTOLIC BLOOD PRESSURE: 69 MMHG | TEMPERATURE: 97.9 F

## 2019-03-27 PROCEDURE — 2709999900 HC NON-CHARGEABLE SUPPLY

## 2019-03-27 PROCEDURE — 99213 OFFICE O/P EST LOW 20 MIN: CPT

## 2019-04-01 ENCOUNTER — APPOINTMENT (OUTPATIENT)
Dept: CT IMAGING | Age: 49
DRG: 493 | End: 2019-04-01
Payer: MEDICARE

## 2019-04-01 ENCOUNTER — NURSE TRIAGE (OUTPATIENT)
Dept: ADMINISTRATIVE | Age: 49
End: 2019-04-01

## 2019-04-01 ENCOUNTER — HOSPITAL ENCOUNTER (EMERGENCY)
Age: 49
Discharge: HOME | DRG: 493 | End: 2019-04-01
Payer: MEDICARE

## 2019-04-01 VITALS
WEIGHT: 222 LBS | HEART RATE: 100 BPM | SYSTOLIC BLOOD PRESSURE: 109 MMHG | DIASTOLIC BLOOD PRESSURE: 53 MMHG | HEIGHT: 66 IN | BODY MASS INDEX: 35.68 KG/M2 | RESPIRATION RATE: 18 BRPM | OXYGEN SATURATION: 98 %

## 2019-04-01 DIAGNOSIS — L03.115 CELLULITIS OF RIGHT LOWER EXTREMITY: Primary | ICD-10-CM

## 2019-04-01 LAB
ALBUMIN SERPL-MCNC: 3.5 G/DL (ref 3.5–5.1)
ALP BLD-CCNC: 84 U/L (ref 38–126)
ALT SERPL-CCNC: 8 U/L (ref 11–66)
ANION GAP SERPL CALCULATED.3IONS-SCNC: 11 MEQ/L (ref 8–16)
AST SERPL-CCNC: 9 U/L (ref 5–40)
BASOPHILS # BLD: 0.5 %
BASOPHILS ABSOLUTE: 0.1 THOU/MM3 (ref 0–0.1)
BILIRUB SERPL-MCNC: 0.4 MG/DL (ref 0.3–1.2)
BUN BLDV-MCNC: 12 MG/DL (ref 7–22)
C-REACTIVE PROTEIN: 6 MG/DL (ref 0–1)
CALCIUM SERPL-MCNC: 9.2 MG/DL (ref 8.5–10.5)
CHLORIDE BLD-SCNC: 95 MEQ/L (ref 98–111)
CO2: 25 MEQ/L (ref 23–33)
CREAT SERPL-MCNC: 0.8 MG/DL (ref 0.4–1.2)
EOSINOPHIL # BLD: 1.9 %
EOSINOPHILS ABSOLUTE: 0.3 THOU/MM3 (ref 0–0.4)
ERYTHROCYTE [DISTWIDTH] IN BLOOD BY AUTOMATED COUNT: 15.4 % (ref 11.5–14.5)
ERYTHROCYTE [DISTWIDTH] IN BLOOD BY AUTOMATED COUNT: 46.7 FL (ref 35–45)
GFR SERPL CREATININE-BSD FRML MDRD: > 90 ML/MIN/1.73M2
GLUCOSE BLD-MCNC: 409 MG/DL (ref 70–108)
HCT VFR BLD CALC: 38.6 % (ref 42–52)
HEMOGLOBIN: 13.2 GM/DL (ref 14–18)
IMMATURE GRANS (ABS): 0.1 THOU/MM3 (ref 0–0.07)
IMMATURE GRANULOCYTES: 0.7 %
LACTIC ACID: 2.1 MMOL/L (ref 0.5–2.2)
LYMPHOCYTES # BLD: 12.5 %
LYMPHOCYTES ABSOLUTE: 1.8 THOU/MM3 (ref 1–4.8)
MCH RBC QN AUTO: 29 PG (ref 26–33)
MCHC RBC AUTO-ENTMCNC: 34.2 GM/DL (ref 32.2–35.5)
MCV RBC AUTO: 84.8 FL (ref 80–94)
MONOCYTES # BLD: 9.1 %
MONOCYTES ABSOLUTE: 1.3 THOU/MM3 (ref 0.4–1.3)
NUCLEATED RED BLOOD CELLS: 0 /100 WBC
OSMOLALITY CALCULATION: 279.7 MOSMOL/KG (ref 275–300)
PLATELET # BLD: 240 THOU/MM3 (ref 130–400)
PMV BLD AUTO: 9.4 FL (ref 9.4–12.4)
POTASSIUM SERPL-SCNC: 4.4 MEQ/L (ref 3.5–5.2)
RBC # BLD: 4.55 MILL/MM3 (ref 4.7–6.1)
SEDIMENTATION RATE, ERYTHROCYTE: 44 MM/HR (ref 0–10)
SEG NEUTROPHILS: 75.3 %
SEGMENTED NEUTROPHILS ABSOLUTE COUNT: 10.9 THOU/MM3 (ref 1.8–7.7)
SODIUM BLD-SCNC: 131 MEQ/L (ref 135–145)
TOTAL PROTEIN: 7.7 G/DL (ref 6.1–8)
WBC # BLD: 14.5 THOU/MM3 (ref 4.8–10.8)

## 2019-04-01 PROCEDURE — 36415 COLL VENOUS BLD VENIPUNCTURE: CPT

## 2019-04-01 PROCEDURE — 86140 C-REACTIVE PROTEIN: CPT

## 2019-04-01 PROCEDURE — 2580000003 HC RX 258: Performed by: STUDENT IN AN ORGANIZED HEALTH CARE EDUCATION/TRAINING PROGRAM

## 2019-04-01 PROCEDURE — 87070 CULTURE OTHR SPECIMN AEROBIC: CPT

## 2019-04-01 PROCEDURE — 87040 BLOOD CULTURE FOR BACTERIA: CPT

## 2019-04-01 PROCEDURE — 87205 SMEAR GRAM STAIN: CPT

## 2019-04-01 PROCEDURE — 85651 RBC SED RATE NONAUTOMATED: CPT

## 2019-04-01 PROCEDURE — 87077 CULTURE AEROBIC IDENTIFY: CPT

## 2019-04-01 PROCEDURE — 85025 COMPLETE CBC W/AUTO DIFF WBC: CPT

## 2019-04-01 PROCEDURE — 83605 ASSAY OF LACTIC ACID: CPT

## 2019-04-01 PROCEDURE — 99284 EMERGENCY DEPT VISIT MOD MDM: CPT

## 2019-04-01 PROCEDURE — 96366 THER/PROPH/DIAG IV INF ADDON: CPT

## 2019-04-01 PROCEDURE — 87075 CULTR BACTERIA EXCEPT BLOOD: CPT

## 2019-04-01 PROCEDURE — 6360000002 HC RX W HCPCS: Performed by: STUDENT IN AN ORGANIZED HEALTH CARE EDUCATION/TRAINING PROGRAM

## 2019-04-01 PROCEDURE — 73701 CT LOWER EXTREMITY W/DYE: CPT

## 2019-04-01 PROCEDURE — 80053 COMPREHEN METABOLIC PANEL: CPT

## 2019-04-01 PROCEDURE — 6360000004 HC RX CONTRAST MEDICATION: Performed by: STUDENT IN AN ORGANIZED HEALTH CARE EDUCATION/TRAINING PROGRAM

## 2019-04-01 PROCEDURE — 2709999900 HC NON-CHARGEABLE SUPPLY

## 2019-04-01 PROCEDURE — 6370000000 HC RX 637 (ALT 250 FOR IP): Performed by: STUDENT IN AN ORGANIZED HEALTH CARE EDUCATION/TRAINING PROGRAM

## 2019-04-01 PROCEDURE — 96365 THER/PROPH/DIAG IV INF INIT: CPT

## 2019-04-01 RX ORDER — OXYCODONE HYDROCHLORIDE AND ACETAMINOPHEN 5; 325 MG/1; MG/1
1 TABLET ORAL ONCE
Status: COMPLETED | OUTPATIENT
Start: 2019-04-01 | End: 2019-04-01

## 2019-04-01 RX ORDER — MIRTAZAPINE 15 MG/1
15 TABLET, FILM COATED ORAL NIGHTLY PRN
Status: ON HOLD | COMMUNITY
End: 2019-04-30 | Stop reason: HOSPADM

## 2019-04-01 RX ORDER — AMOXICILLIN AND CLAVULANATE POTASSIUM 875; 125 MG/1; MG/1
1 TABLET, FILM COATED ORAL 2 TIMES DAILY
Qty: 20 TABLET | Refills: 0 | Status: ON HOLD | OUTPATIENT
Start: 2019-04-01 | End: 2019-04-08 | Stop reason: HOSPADM

## 2019-04-01 RX ORDER — OXYCODONE HYDROCHLORIDE 10 MG/1
5 TABLET ORAL EVERY 6 HOURS PRN
Qty: 12 TABLET | Refills: 0 | Status: ON HOLD | OUTPATIENT
Start: 2019-04-01 | End: 2019-04-10 | Stop reason: HOSPADM

## 2019-04-01 RX ADMIN — OXYCODONE AND ACETAMINOPHEN 1 TABLET: 5; 325 TABLET ORAL at 15:45

## 2019-04-01 RX ADMIN — IOPAMIDOL 80 ML: 755 INJECTION, SOLUTION INTRAVENOUS at 14:15

## 2019-04-01 RX ADMIN — VANCOMYCIN HYDROCHLORIDE 1500 MG: 5 INJECTION, POWDER, LYOPHILIZED, FOR SOLUTION INTRAVENOUS at 15:59

## 2019-04-01 ASSESSMENT — PAIN SCALES - GENERAL
PAINLEVEL_OUTOF10: 10
PAINLEVEL_OUTOF10: 10
PAINLEVEL_OUTOF10: 6

## 2019-04-01 ASSESSMENT — ENCOUNTER SYMPTOMS
SHORTNESS OF BREATH: 0
DIARRHEA: 0
SORE THROAT: 0
EYE REDNESS: 0
COUGH: 0
BACK PAIN: 0
COLOR CHANGE: 1
NAUSEA: 0
RHINORRHEA: 0
ABDOMINAL PAIN: 0
EYE DISCHARGE: 0
WHEEZING: 0
VOMITING: 0

## 2019-04-01 ASSESSMENT — PAIN DESCRIPTION - LOCATION: LOCATION: LEG

## 2019-04-01 NOTE — ED NOTES
tracy updated on vanc administration run to. Patient aware of plan of care.        Marcia Issa RN  04/01/19 [id number]

## 2019-04-01 NOTE — ED NOTES
patient updated that we are awaiting antibiotic from pharmacy.       Rahel Bernardo RN  04/01/19 [id number]

## 2019-04-01 NOTE — ED PROVIDER NOTES
[facility]  eMERGENCY dEPARTMENT eNCOUnter          CHIEF COMPLAINT       Chief Complaint   Patient presents with    Wound Infection       Nurses Notes reviewed and I agree except as noted in the HPI. HISTORY OF PRESENT ILLNESS    Manuela Pradhan is a 44 y.o. male who presents to the Emergency Department with a medical history of DM for the evaluation of a wound infection. The patient states that his RLE BKA was completed on July 20,2016. Patient admits to have experiencing multiple infections of his BKA since his initial surgery. He states that on 2-20-19 that an I&D was completed by Dr. Ailyn Baird (orthopedic surgery). Patient states that he noticed increased swelling of his RLE 2 days ago. He reports that home health nursing, who evaluates the patient daily, states that the patient wound was warm and presented with erythema causing him to be sent to the ED for evaluation. He rates his current pain as a 10/10 in severity and aching in character. He states that his wound is packed daily. Patient's denies any fever or chills. His infectious disease specialist is Dr. Otf Rhodes. The patient reports no additional symptoms or complaints at this time. The HPI was provided by the patient. REVIEW OF SYSTEMS     Review of Systems   Constitutional: Negative for appetite change, chills, fatigue and fever. HENT: Negative for congestion, ear pain, rhinorrhea and sore throat. Eyes: Negative for discharge, redness and visual disturbance. Respiratory: Negative for cough, shortness of breath and wheezing. Cardiovascular: Negative for chest pain, palpitations and leg swelling. Gastrointestinal: Negative for abdominal pain, diarrhea, nausea and vomiting. Genitourinary: Negative for decreased urine volume, difficulty urinating, dysuria and hematuria. Musculoskeletal: Negative for arthralgias, back pain, joint swelling and neck pain.    Skin: Positive for color change (erythema of RLE) and wound every 6 hours as needed for PainHistorical Med      sertraline (ZOLOFT) 100 MG tablet Take 1.5 tablets by mouth daily, Disp-45 tablet, R-0Normal      Continuous Blood Gluc  (FREESTYLE MARIANELA READER) LIANNE 1 Units by Does not apply route continuous, Disp-1 Device, R-0Normal      blood glucose monitor strips Accucheck Sheila Test Strips Test blood sugars 4 times daily DX:E11.65, Disp-400 strip, R-5, Normal      Lancets MISC Disp-100 each, R-3, PrintUse to test blood sugars 4 times daily DX:E11.65      Insulin Syringe-Needle U-100 29G X 1/2\" 0.3 ML MISC 4 TIMES DAILY AFTER MEALS AND BEFORE BEDTIME Starting u 2018, Disp-100 each, R-1, Print      Insulin Pen Needle 32G X 4 MM MISC DAILY Starting u 2018, Disp-100 each, R-0, Print      Blood Glucose Monitoring Suppl LIANNE Disp-1 Device, R-0, NormalUse to test blood sugars DX:E11.65             ALLERGIES     is allergic to pcn [penicillins] and clindamycin/lincomycin. FAMILY HISTORY      indicated that his mother is . He reported the following about his father: unknown. He indicated that the status of his maternal grandmother is unknown. He indicated that the status of his maternal grandfather is unknown.   family history includes Arthritis in his maternal grandfather; Depression in his mother; Diabetes in his mother; Early Death in his mother; Heart Disease in his maternal grandfather; High Blood Pressure in his mother; High Cholesterol in his mother; Other in his mother; Vision Loss in his maternal grandmother. SOCIAL HISTORY      reports that he quit smoking about 34 years ago. His smoking use included cigars. He has a 65.00 pack-year smoking history. He has never used smokeless tobacco. He reports that he drank alcohol. He reports that he does not use drugs. PHYSICAL EXAM     INITIAL VITALS:  height is 5' 6\" (1.676 m) and weight is 222 lb (100.7 kg). His blood pressure is 109/53 (abnormal) and his pulse is 100.  His respiration is 18 and oxygen saturation is 98%. Physical Exam   Constitutional: He is oriented to person, place, and time. He appears well-developed and well-nourished. Non-toxic appearance. HENT:   Head: Normocephalic and atraumatic. Right Ear: Tympanic membrane and external ear normal.   Left Ear: Tympanic membrane and external ear normal.   Nose: Nose normal.   Mouth/Throat: Oropharynx is clear and moist and mucous membranes are normal. No oropharyngeal exudate, posterior oropharyngeal edema or posterior oropharyngeal erythema. Eyes: Conjunctivae and EOM are normal.   Neck: Normal range of motion. Neck supple. No JVD present. Cardiovascular: Regular rhythm, normal heart sounds, intact distal pulses and normal pulses. Tachycardia present. Exam reveals no gallop and no friction rub. No murmur heard. Pulmonary/Chest: Effort normal and breath sounds normal. No respiratory distress. He has no decreased breath sounds. He has no wheezes. He has no rhonchi. He has no rales. Abdominal: Soft. Bowel sounds are normal. He exhibits no distension. There is no tenderness. There is no rebound, no guarding and no CVA tenderness. Musculoskeletal: Normal range of motion. He exhibits no edema. Neurological: He is alert and oriented to person, place, and time. He exhibits normal muscle tone. Coordination normal.   Skin: Skin is warm and dry. No rash noted. He is not diaphoretic. Patient has a right lower extremity BKA which presented with a 10 cm area of erythema, firmness, tenderness, and warmth secondary to palpation. His wound is packed without any signs of drainage. Nursing note and vitals reviewed.       DIFFERENTIAL DIAGNOSIS:   Infection, sepsis, continual infection    DIAGNOSTIC RESULTS     EKG: All EKG's are interpreted by the Emergency Department Physician who either signs or Co-signs this chart in theabsence of a cardiologist.    None    RADIOLOGY:non-plain film images(s) such as CT, Ultrasound and MRI are read by the radiologist.    [id number] [facility]   Final Result   1. No evidence for osteomyelitis. Persistent findings of cellulitis involving the stump. No abscess or drainable fluid collection is seen. 2. Note that there is a deep wound along the lateral aspect of the fibular head, which was not present previously, suggesting possible wound dehiscence. Clinical correlation recommended. **This report has been created using voice recognition software. It may contain minor errors which are inherent in voice recognition technology. **      Final report electronically signed by Dr. Aubree Ruiz on 4/1/2019 2:36 PM          LABS:     Labs Reviewed   ANAEROBIC AND AEROBIC CULTURE - Abnormal; Notable for the following components:       Result Value    Organism Streptococcus agalactiae - (Group B) (*)     All other components within normal limits    Narrative:     Source: leg       Site: swab right lateral leg           Current Antibiotics: Amoxicillin/CA   CBC WITH AUTO DIFFERENTIAL - Abnormal; Notable for the following components:    WBC 14.5 (*)     RBC 4.55 (*)     Hemoglobin 13.2 (*)     Hematocrit 38.6 (*)     RDW-CV 15.4 (*)     RDW-SD 46.7 (*)     Segs Absolute 10.9 (*)     Immature Grans (Abs) 0.10 (*)     All other components within normal limits   COMPREHENSIVE METABOLIC PANEL - Abnormal; Notable for the following components:    Glucose 409 (*)     Sodium 131 (*)     Chloride 95 (*)     ALT 8 (*)     All other components within normal limits   C-REACTIVE PROTEIN - Abnormal; Notable for the following components:    CRP 6.00 (*)     All other components within normal limits   SEDIMENTATION RATE - Abnormal; Notable for the following components:    Sed Rate 44 (*)     All other components within normal limits   CULTURE BLOOD #1    Narrative:     Source: blood-Adult-suboptimal <5.5oz./set volume       Site: Peripheral Vein            Current Antibiotics: not stated   CULTURE BLOOD #2 Narrative:     Source: blood-Adult-suboptimal <5.5oz./set volume       Site: Peripheral Vein            Current Antibiotics: not stated   LACTIC ACID, PLASMA   ANION GAP   OSMOLALITY   GLOMERULAR FILTRATION RATE, ESTIMATED       EMERGENCY DEPARTMENT COURSE:   Vitals:    Vitals:    04/01/19 1505 04/01/19 1546 04/01/19 1648 04/01/19 1748   BP: 136/75 129/77 (!) 146/81 (!) 109/53   Pulse:  105 103 100   Resp:  18 18 18   TempSrc:       SpO2:  99% 99% 98%   Weight:       Height:           1:03 PM: The patient was seen and evaluated. MDM:  Labs show 14.5 WBC. Afebrile, normal lactic, and CT +for infection/cellulitis, negative for abscess. Given vancomycin and percocet in ED and Dr. Ridge Campbell consulted. He advised wound repacking, culture of wound, and discharge home with outpatient follow up on Wednesday. Patient amenable to plan of discharge. CRITICAL CARE:   None     CONSULTS:  Dr. Declan Castaneda patient be discharged and follow up on wound clinic Wednesday    PROCEDURES:  None    FINAL IMPRESSION      1. Cellulitis of right lower extremity          DISPOSITION/PLAN   discharge    PATIENT REFERRED TO:  Hayden Damon, [address]  [phone]    Call   office tomorrow to set up appointment in wound clinic on Wednesday, per Dr. Kareem Maloney:  Discharge Medication List as of 4/1/2019  5:05 PM      START taking these medications    Details   amoxicillin-clavulanate (AUGMENTIN) 875-125 MG per tablet Take 1 tablet by mouth 2 times daily for 10 days, Disp-20 tablet, R-0Print      oxyCODONE (OXY-IR) 10 MG immediate release tablet Take 0.5 tablets by mouth every 6 hours as needed for Pain for up to 3 doses. , Disp-12 tablet, R-0Print             (Please note that portions of this note were completed with a voice recognition program.Efforts were made to edit the dictations but occasionally words are mis-transcribed.)    The patient was given an opportunity

## 2019-04-01 NOTE — PROGRESS NOTES
Pharmacy Medication History Note      List of current medications patient is taking is complete. Source of information: Patient, [address] made to medication list:  Medications removed (include reason, ex. therapy complete or physician discontinued):  Lisinopril- patient reports it was discontinued months ago    Medications added/doses adjusted:  Humalog 100units/mL 18 units TID with meals    Other notes (ex. Recent course of antibiotics, Coumadin dosing):  Patient last took Insulin this morning with breakfast.   Denies use of other OTC or herbal medications.       Allergies reviewed      Electronically signed by Shine Reddy on 4/1/2019 at 3:02 PM

## 2019-04-01 NOTE — ED NOTES
Patient updated on plan of care, awaiting MD. Call light in reach.        Rahel Bernardo RN  04/01/19 4016

## 2019-04-01 NOTE — TELEPHONE ENCOUNTER
Reason for Disposition   [1] MODERATE leg swelling (e.g., swelling extends up to knees) AND [2] new onset or worsening    Answer Assessment - Initial Assessment Questions  1. ONSET: \"When did the swelling start? \" (e.g., minutes, hours, days)      Yesterday a little- R stump area   2. LOCATION: \"What part of the leg is swollen? \"  \"Are both legs swollen or just one leg? \"      Knee and above -amputation below the knee   3. SEVERITY: \"How bad is the swelling? \" (e.g., localized; mild, moderate, severe)   - Localized - small area of swelling localized to one leg   - MILD pedal edema - swelling limited to foot and ankle, pitting edema < 1/4 inch (6 mm) deep, rest and elevation eliminate most or all swelling   - MODERATE edema - swelling of lower leg to knee, pitting edema > 1/4 inch (6 mm) deep, rest and elevation only partially reduce swelling   - SEVERE edema - swelling extends above knee, facial or hand swelling present       Severe according to caller    4. REDNESS: \"Does the swelling look red or infected? \"      Red, but no odor or infection   5. PAIN: \"Is the swelling painful to touch? \" If so, ask: \"How painful is it? \"   (Scale 1-10; mild, moderate or severe)      10  6. FEVER: \"Do you have a fever? \" If so, ask: \"What is it, how was it measured, and when did it start? \"       none  7. CAUSE: \"What do you think is causing the leg swelling? \"      Not sure   8. MEDICAL HISTORY: \"Do you have a history of heart failure, kidney disease, liver failure, or cancer? \"     Surgery   9. RECURRENT SYMPTOM: \"Have you had leg swelling before? \" If so, ask: \"When was the last time? \" \"What happened that time? \"        10. OTHER SYMPTOMS: \"Do you have any other symptoms? \" (e.g., chest pain, difficulty breathing)        2/20   11. PREGNANCY: \"Is there any chance you are pregnant? \" \"When was your last menstrual period? \"        na    Protocols used: LEG SWELLING AND EDEMA-ADULT-AH

## 2019-04-01 NOTE — TELEPHONE ENCOUNTER
Lyssamart states that he had a surgery on Feb 20 on his stump-R-leg. Was a whole in the side of the stump and Dr Jannet Nance sewed it shut. Was to the Dr on Monday and was in no pain and also there was no swelling. Home health coming everyday to change the dressing. Noted yesterday that it was a little swollen, but much more today and very sore (10). Was told that she would come to the ER/ Cannot get a hold of Dr(infectious) . Discussed that he could call the surgeon to see if he could see him,otherwise have someone drive him to the ER.

## 2019-04-02 ENCOUNTER — NURSE TRIAGE (OUTPATIENT)
Dept: ADMINISTRATIVE | Age: 49
End: 2019-04-02

## 2019-04-02 ENCOUNTER — HOSPITAL ENCOUNTER (INPATIENT)
Age: 49
LOS: 8 days | Discharge: HOME HEALTH | DRG: 493 | End: 2019-04-10
Attending: FAMILY MEDICINE | Admitting: INTERNAL MEDICINE
Payer: MEDICARE

## 2019-04-02 DIAGNOSIS — L03.119 CELLULITIS AND ABSCESS OF LEG: Primary | ICD-10-CM

## 2019-04-02 DIAGNOSIS — Z89.9 S/P AMPUTATION: ICD-10-CM

## 2019-04-02 DIAGNOSIS — T87.40 INFECTION OF BELOW KNEE AMPUTATION STUMP (HCC): ICD-10-CM

## 2019-04-02 DIAGNOSIS — L02.419 CELLULITIS AND ABSCESS OF LEG: Primary | ICD-10-CM

## 2019-04-02 PROBLEM — T81.42XA DEEP POSTOPERATIVE WOUND INFECTION: Status: ACTIVE | Noted: 2019-04-02

## 2019-04-02 LAB
ALBUMIN SERPL-MCNC: 3.5 G/DL (ref 3.5–5.1)
ALP BLD-CCNC: 84 U/L (ref 38–126)
ALT SERPL-CCNC: 6 U/L (ref 11–66)
ANION GAP SERPL CALCULATED.3IONS-SCNC: 13 MEQ/L (ref 8–16)
AST SERPL-CCNC: 9 U/L (ref 5–40)
AVERAGE GLUCOSE: 213 MG/DL (ref 70–126)
BASOPHILS # BLD: 0.3 %
BASOPHILS ABSOLUTE: 0 THOU/MM3 (ref 0–0.1)
BILIRUB SERPL-MCNC: 0.5 MG/DL (ref 0.3–1.2)
BILIRUBIN DIRECT: < 0.2 MG/DL (ref 0–0.3)
BUN BLDV-MCNC: 11 MG/DL (ref 7–22)
CALCIUM SERPL-MCNC: 9.1 MG/DL (ref 8.5–10.5)
CHLORIDE BLD-SCNC: 92 MEQ/L (ref 98–111)
CO2: 23 MEQ/L (ref 23–33)
CREAT SERPL-MCNC: 0.7 MG/DL (ref 0.4–1.2)
EOSINOPHIL # BLD: 1.9 %
EOSINOPHILS ABSOLUTE: 0.3 THOU/MM3 (ref 0–0.4)
ERYTHROCYTE [DISTWIDTH] IN BLOOD BY AUTOMATED COUNT: 15 % (ref 11.5–14.5)
ERYTHROCYTE [DISTWIDTH] IN BLOOD BY AUTOMATED COUNT: 46.8 FL (ref 35–45)
GFR SERPL CREATININE-BSD FRML MDRD: > 90 ML/MIN/1.73M2
GLUCOSE BLD-MCNC: 361 MG/DL (ref 70–108)
GLUCOSE BLD-MCNC: 408 MG/DL (ref 70–108)
HBA1C MFR BLD: 9.1 % (ref 4.4–6.4)
HCT VFR BLD CALC: 36.7 % (ref 42–52)
HEMOGLOBIN: 12.7 GM/DL (ref 14–18)
IMMATURE GRANS (ABS): 0.07 THOU/MM3 (ref 0–0.07)
IMMATURE GRANULOCYTES: 0.5 %
LACTIC ACID: 1.7 MMOL/L (ref 0.5–2.2)
LYMPHOCYTES # BLD: 15.4 %
LYMPHOCYTES ABSOLUTE: 2.2 THOU/MM3 (ref 1–4.8)
MCH RBC QN AUTO: 29.4 PG (ref 26–33)
MCHC RBC AUTO-ENTMCNC: 34.6 GM/DL (ref 32.2–35.5)
MCV RBC AUTO: 85 FL (ref 80–94)
MONOCYTES # BLD: 8.6 %
MONOCYTES ABSOLUTE: 1.2 THOU/MM3 (ref 0.4–1.3)
NUCLEATED RED BLOOD CELLS: 0 /100 WBC
OSMOLALITY CALCULATION: 273.7 MOSMOL/KG (ref 275–300)
PLATELET # BLD: 250 THOU/MM3 (ref 130–400)
PMV BLD AUTO: 9.3 FL (ref 9.4–12.4)
POTASSIUM SERPL-SCNC: 4.2 MEQ/L (ref 3.5–5.2)
RBC # BLD: 4.32 MILL/MM3 (ref 4.7–6.1)
SEG NEUTROPHILS: 73.3 %
SEGMENTED NEUTROPHILS ABSOLUTE COUNT: 10.3 THOU/MM3 (ref 1.8–7.7)
SODIUM BLD-SCNC: 128 MEQ/L (ref 135–145)
TOTAL PROTEIN: 7.8 G/DL (ref 6.1–8)
WBC # BLD: 14 THOU/MM3 (ref 4.8–10.8)

## 2019-04-02 PROCEDURE — 82948 REAGENT STRIP/BLOOD GLUCOSE: CPT

## 2019-04-02 PROCEDURE — 87040 BLOOD CULTURE FOR BACTERIA: CPT

## 2019-04-02 PROCEDURE — 99284 EMERGENCY DEPT VISIT MOD MDM: CPT

## 2019-04-02 PROCEDURE — 2580000003 HC RX 258: Performed by: FAMILY MEDICINE

## 2019-04-02 PROCEDURE — 2580000003 HC RX 258: Performed by: INTERNAL MEDICINE

## 2019-04-02 PROCEDURE — 80076 HEPATIC FUNCTION PANEL: CPT

## 2019-04-02 PROCEDURE — 1200000000 HC SEMI PRIVATE

## 2019-04-02 PROCEDURE — 83605 ASSAY OF LACTIC ACID: CPT

## 2019-04-02 PROCEDURE — 6360000002 HC RX W HCPCS: Performed by: INTERNAL MEDICINE

## 2019-04-02 PROCEDURE — 2500000003 HC RX 250 WO HCPCS: Performed by: INTERNAL MEDICINE

## 2019-04-02 PROCEDURE — 99222 1ST HOSP IP/OBS MODERATE 55: CPT | Performed by: INTERNAL MEDICINE

## 2019-04-02 PROCEDURE — 85025 COMPLETE CBC W/AUTO DIFF WBC: CPT

## 2019-04-02 PROCEDURE — 6370000000 HC RX 637 (ALT 250 FOR IP): Performed by: INTERNAL MEDICINE

## 2019-04-02 PROCEDURE — 83036 HEMOGLOBIN GLYCOSYLATED A1C: CPT

## 2019-04-02 PROCEDURE — 36415 COLL VENOUS BLD VENIPUNCTURE: CPT

## 2019-04-02 PROCEDURE — 96375 TX/PRO/DX INJ NEW DRUG ADDON: CPT

## 2019-04-02 PROCEDURE — 6360000002 HC RX W HCPCS: Performed by: FAMILY MEDICINE

## 2019-04-02 PROCEDURE — 96374 THER/PROPH/DIAG INJ IV PUSH: CPT

## 2019-04-02 PROCEDURE — 80048 BASIC METABOLIC PNL TOTAL CA: CPT

## 2019-04-02 RX ORDER — ONDANSETRON 2 MG/ML
4 INJECTION INTRAMUSCULAR; INTRAVENOUS ONCE
Status: COMPLETED | OUTPATIENT
Start: 2019-04-02 | End: 2019-04-02

## 2019-04-02 RX ORDER — POTASSIUM CHLORIDE 7.45 MG/ML
10 INJECTION INTRAVENOUS PRN
Status: DISCONTINUED | OUTPATIENT
Start: 2019-04-02 | End: 2019-04-10 | Stop reason: HOSPADM

## 2019-04-02 RX ORDER — FAMOTIDINE 20 MG/1
20 TABLET, FILM COATED ORAL 2 TIMES DAILY
Status: DISCONTINUED | OUTPATIENT
Start: 2019-04-02 | End: 2019-04-10 | Stop reason: HOSPADM

## 2019-04-02 RX ORDER — DEXTROSE MONOHYDRATE 25 G/50ML
12.5 INJECTION, SOLUTION INTRAVENOUS PRN
Status: DISCONTINUED | OUTPATIENT
Start: 2019-04-02 | End: 2019-04-10 | Stop reason: HOSPADM

## 2019-04-02 RX ORDER — DEXTROSE MONOHYDRATE 50 MG/ML
100 INJECTION, SOLUTION INTRAVENOUS PRN
Status: DISCONTINUED | OUTPATIENT
Start: 2019-04-02 | End: 2019-04-10 | Stop reason: HOSPADM

## 2019-04-02 RX ORDER — ONDANSETRON 2 MG/ML
4 INJECTION INTRAMUSCULAR; INTRAVENOUS EVERY 6 HOURS PRN
Status: DISCONTINUED | OUTPATIENT
Start: 2019-04-02 | End: 2019-04-10 | Stop reason: HOSPADM

## 2019-04-02 RX ORDER — NICOTINE POLACRILEX 4 MG
15 LOZENGE BUCCAL PRN
Status: DISCONTINUED | OUTPATIENT
Start: 2019-04-02 | End: 2019-04-10 | Stop reason: HOSPADM

## 2019-04-02 RX ORDER — 0.9 % SODIUM CHLORIDE 0.9 %
30 INTRAVENOUS SOLUTION INTRAVENOUS ONCE
Status: COMPLETED | OUTPATIENT
Start: 2019-04-02 | End: 2019-04-02

## 2019-04-02 RX ORDER — MIRTAZAPINE 15 MG/1
15 TABLET, FILM COATED ORAL NIGHTLY
Status: DISCONTINUED | OUTPATIENT
Start: 2019-04-02 | End: 2019-04-10 | Stop reason: HOSPADM

## 2019-04-02 RX ORDER — ACETAMINOPHEN 325 MG/1
650 TABLET ORAL EVERY 4 HOURS PRN
Status: DISCONTINUED | OUTPATIENT
Start: 2019-04-02 | End: 2019-04-10 | Stop reason: HOSPADM

## 2019-04-02 RX ORDER — MORPHINE SULFATE 4 MG/ML
4 INJECTION, SOLUTION INTRAMUSCULAR; INTRAVENOUS ONCE
Status: COMPLETED | OUTPATIENT
Start: 2019-04-02 | End: 2019-04-02

## 2019-04-02 RX ORDER — POTASSIUM CHLORIDE 20 MEQ/1
40 TABLET, EXTENDED RELEASE ORAL PRN
Status: DISCONTINUED | OUTPATIENT
Start: 2019-04-02 | End: 2019-04-10 | Stop reason: HOSPADM

## 2019-04-02 RX ORDER — SODIUM CHLORIDE 0.9 % (FLUSH) 0.9 %
10 SYRINGE (ML) INJECTION PRN
Status: DISCONTINUED | OUTPATIENT
Start: 2019-04-02 | End: 2019-04-10 | Stop reason: HOSPADM

## 2019-04-02 RX ORDER — SODIUM CHLORIDE 9 MG/ML
INJECTION, SOLUTION INTRAVENOUS CONTINUOUS
Status: DISCONTINUED | OUTPATIENT
Start: 2019-04-02 | End: 2019-04-03

## 2019-04-02 RX ORDER — SODIUM CHLORIDE 0.9 % (FLUSH) 0.9 %
10 SYRINGE (ML) INJECTION EVERY 12 HOURS SCHEDULED
Status: DISCONTINUED | OUTPATIENT
Start: 2019-04-02 | End: 2019-04-10 | Stop reason: HOSPADM

## 2019-04-02 RX ADMIN — FAMOTIDINE 20 MG: 20 TABLET ORAL at 20:56

## 2019-04-02 RX ADMIN — MIRTAZAPINE 15 MG: 15 TABLET, FILM COATED ORAL at 20:56

## 2019-04-02 RX ADMIN — VANCOMYCIN HYDROCHLORIDE 1500 MG: 5 INJECTION, POWDER, LYOPHILIZED, FOR SOLUTION INTRAVENOUS at 19:09

## 2019-04-02 RX ADMIN — MORPHINE SULFATE 4 MG: 4 INJECTION INTRAVENOUS at 17:25

## 2019-04-02 RX ADMIN — SODIUM CHLORIDE: 9 INJECTION, SOLUTION INTRAVENOUS at 21:00

## 2019-04-02 RX ADMIN — ONDANSETRON 4 MG: 2 INJECTION INTRAMUSCULAR; INTRAVENOUS at 17:25

## 2019-04-02 RX ADMIN — AZTREONAM 1 G: 1 INJECTION, POWDER, LYOPHILIZED, FOR SOLUTION INTRAMUSCULAR; INTRAVENOUS at 21:43

## 2019-04-02 RX ADMIN — INSULIN LISPRO 6 UNITS: 100 INJECTION, SOLUTION INTRAVENOUS; SUBCUTANEOUS at 20:56

## 2019-04-02 RX ADMIN — SODIUM CHLORIDE 1914 ML: 9 INJECTION, SOLUTION INTRAVENOUS at 17:25

## 2019-04-02 RX ADMIN — ENOXAPARIN SODIUM 40 MG: 40 INJECTION SUBCUTANEOUS at 20:56

## 2019-04-02 ASSESSMENT — PAIN DESCRIPTION - FREQUENCY
FREQUENCY: CONTINUOUS
FREQUENCY: CONTINUOUS

## 2019-04-02 ASSESSMENT — PAIN DESCRIPTION - DESCRIPTORS
DESCRIPTORS: SHOOTING
DESCRIPTORS: SORE

## 2019-04-02 ASSESSMENT — PAIN DESCRIPTION - LOCATION
LOCATION: LEG

## 2019-04-02 ASSESSMENT — PAIN DESCRIPTION - PROGRESSION
CLINICAL_PROGRESSION: NOT CHANGED
CLINICAL_PROGRESSION: NOT CHANGED

## 2019-04-02 ASSESSMENT — PAIN SCALES - GENERAL
PAINLEVEL_OUTOF10: 5
PAINLEVEL_OUTOF10: 10
PAINLEVEL_OUTOF10: 0

## 2019-04-02 ASSESSMENT — PAIN DESCRIPTION - PAIN TYPE
TYPE: ACUTE PAIN

## 2019-04-02 ASSESSMENT — ENCOUNTER SYMPTOMS
SHORTNESS OF BREATH: 0
ABDOMINAL PAIN: 0

## 2019-04-02 ASSESSMENT — PAIN DESCRIPTION - ORIENTATION
ORIENTATION: RIGHT

## 2019-04-02 ASSESSMENT — PAIN DESCRIPTION - ONSET
ONSET: ON-GOING
ONSET: ON-GOING

## 2019-04-02 NOTE — TELEPHONE ENCOUNTER
Alexa states he went to the ER last night, and he had no infection in the wound, the ER felt, but on the skin, also had no fever, States that the nurse noted this morning that he had more redness on the stump and a fever of 100.2 Took some Tylenol-did not help the pain that is worse than yesterday, stump swelling continues and not decreasing with the elevation. Fever is still up. Dr will see pt in office in AM unless he feels he needs to go to the ER tonight, Nurse this morning, told him to go to the ER if he needed to with redness or fever, Discussed taking another does of Tylenol, use some cloth covered ice on the stump and go in if the fever increases, or pus noted, or pain increases.

## 2019-04-02 NOTE — ED TRIAGE NOTES
Pt to Ed today via triage desk with c/o swelling of amputation site R knee. Pt states issues with this site since 2018 and recent I&D in February. Pt states he was seen yesterday at our ED for same issue, prescribed antibiotics and received antibiotics through IV. Pt states increase in pain to \"10/10\" and pt states increase in warmth of knee, change in color to purple, and increase in pain. Pt states fever at home with chills.  Pt afebrile upon arrival.

## 2019-04-02 NOTE — ED NOTES
Pt resting quietly in room no needs expressed. Side rails up x2 with call light in reach. Will continue to monitor.        Matilde Del Castillo, [facility]  04/02/19 1386

## 2019-04-02 NOTE — ED PROVIDER NOTES
[facility]  eMERGENCY dEPARTMENT eNCOUnter          CHIEF COMPLAINT       Chief Complaint   Patient presents with    Wound Infection       Nurses Notes reviewed and I agree except as noted in the HPI. HISTORY OF PRESENT ILLNESS    Victoriano Woodruff is a 44 y.o. male who presents with right leg below-knee amputation stump infection     Location/Symptom: Infection right leg  Timing/Onset: He had an abscess in his right below-knee amputation stump and had incision and drainage 2/20/19  Context/Setting: Diabetic  Chronic right stump infection  Quality: He has a chronic wound on the right lateral stump with a tunnel. He had incision and drainage 2/20/19  Please spent weeks and transitional care on IV antibiotics  Wound never really cleared  And got worse again with more redness and swelling  In the ER yesterday CT scan showed no osteomyelitis, cultures showed some gram-positive  He was given vancomycin and continue with outpatient treatment  Returns today with increased redness and swelling and no improvement, fever 100.2 at home  Duration: Prominent with infection for the last 2 weeks  Modifying Factors: He's been on antibiotics  Severity: 7/10    REVIEW OF SYSTEMS     Review of Systems   Constitutional: Positive for fever. HENT: Negative for congestion. Respiratory: Negative for shortness of breath. Cardiovascular: Negative for chest pain. Gastrointestinal: Negative for abdominal pain. Genitourinary: Negative for dysuria. Musculoskeletal:        Right leg stump redness swelling pain   Skin: Positive for wound. Right lateral BKA stump with a wound draining   Neurological: Negative for weakness and numbness. Hematological: Negative for adenopathy. Psychiatric/Behavioral: Negative for confusion.           PAST MEDICAL HISTORY    has a past medical history of Bipolar 2 disorder ([facility] [address] Diabetes mellitus type 2, uncontrolled ([facility] [address] Diabetic foot ulcer ([facility] [address] Diabetic polyneuropathy ([facility] [address] Diabetic ulcer of right foot associated with type 2 diabetes mellitus ([facility] [address] Essential hypertension, GERD (gastroesophageal reflux disease), Hammer toe of left foot, Heart murmur, History of tobacco abuse, Hx of BKA, right ([facility] [address] Macular edema, diabetic, bilateral ([facility] [address] Marijuana abuse in remission, Onychomycosis, and 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 SURGICAL HISTORY      has a past surgical history that includes other surgical history (Right, 1/14/15); Abscess Drainage (Right); Toe amputation (Right, 1/16/15); Foot Debridement (Right, 07/01/2016); Leg amputation below knee (Right, 07/20/2016); [place] tooth extraction (?when); pr drain infect shoulder bursa (Left, 8/18/2017); pr office/outpt visit,procedure only (Right, 9/20/2018); and incision and drainage (Right, 2/18/2019).     CURRENT MEDICATIONS       Previous Medications    ACETAMINOPHEN (TYLENOL) 500 MG TABLET    Take 1,000 mg by mouth every 6 hours as needed for Pain    AMOXICILLIN-CLAVULANATE (AUGMENTIN) 875-125 MG PER TABLET    Take 1 tablet by mouth 2 times daily for 10 days    BLOOD GLUCOSE MONITOR STRIPS    Accucheck Sheila Test Strips Test blood sugars 4 times daily DX:E11.65    BLOOD GLUCOSE MONITORING SUPPL LIANNE    Use to test blood sugars DX:E11.65    CONTINUOUS BLOOD GLUC  (FREESTYLE MARIANELA READER) LIANNE    1 Units by Does not apply route continuous    INSULIN GLARGINE (BASAGLAR KWIKPEN) 100 UNIT/ML INJECTION PEN    Inject 55 Units into the skin 2 times daily    INSULIN LISPRO (HUMALOG) 100 UNIT/ML INJECTION VIAL    Inject 18 Units into the skin 3 times daily (before meals)    INSULIN PEN NEEDLE 32G X 4 MM MISC    1 each by Does not apply route daily    INSULIN SYRINGE-NEEDLE U-100 29G X 1/2\" 0.3 ML MISC    1 each by Does not apply route 4 times daily (after meals and at bedtime)    LANCETS MISC    Use to test blood sugars 4 times daily DX:E11.65    MIRTAZAPINE (REMERON) 15 MG TABLET    Take 15 mg by mouth nightly as needed    OXYCODONE (OXY-IR) 10 MG IMMEDIATE RELEASE TABLET    Take 0.5 tablets by mouth every 6 hours as needed for Pain for up to 3 doses. SERTRALINE (ZOLOFT) 100 MG TABLET    Take 1.5 tablets by mouth daily       ALLERGIES     is allergic to pcn [penicillins] and clindamycin/lincomycin. FAMILY HISTORY     indicated that his mother is . He reported the following about his father: unknown. He indicated that the status of his maternal grandmother is unknown. He indicated that the status of his maternal grandfather is unknown.   family history includes Arthritis in his maternal grandfather; Depression in his mother; Diabetes in his mother; Early Death in his mother; Heart Disease in his maternal grandfather; High Blood Pressure in his mother; High Cholesterol in his mother; Other in his mother; Vision Loss in his maternal grandmother. SOCIAL HISTORY      reports that he quit smoking about 34 years ago. His smoking use included cigars. He has a 65.00 pack-year smoking history. He has never used smokeless tobacco. He reports that he drank alcohol. He reports that he does not use drugs. PHYSICAL EXAM     INITIAL VITALS:  height is 5' 6\" (1.676 m) and weight is 222 lb (100.7 kg). His oral temperature is 98.2 °F (36.8 °C). His blood pressure is 128/77 and his pulse is 100. His respiration is 16 and oxygen saturation is 99%. Physical Exam   Constitutional: He is oriented to person, place, and time. He appears well-developed and well-nourished. GCS 15   HENT:   Head: Normocephalic and atraumatic. Eyes: Pupils are equal, round, and reactive to light. EOM are normal. No scleral icterus. Neck: Normal range of motion. No JVD present. Cardiovascular: Normal rate and regular rhythm. Pulmonary/Chest: Effort normal and breath sounds normal.   Abdominal: Soft. There is no tenderness.    Musculoskeletal:   Right below-knee amputation stump    Open wound lateral

## 2019-04-02 NOTE — ED NOTES
Called Floor. Transporting pt to [id number]   On cart.      [address], [place]  80/[id number]/[id number]

## 2019-04-02 NOTE — H&P
History & Physical        Patient:  Martín Singh  YOB: 1970    MRN: [id number]     Acct: [de-identified]    PCP: MARILYN Villalba CNP    Date of Admission: 4/2/2019    Date of Service: Pt seen/examined on 4/2/2019 and Admitted to Inpatient with expected LOS greater than two midnights due to medical therapy. Chief Complaint:    Chief Complaint   Patient presents with    Wound Infection         History Of Present Illness:      44 y.o. male who presented to [facility] with \"evaluation of a wound infection. The patient states that his RLE BKA was completed on July 20,2016. Patient admits to have experiencing multiple infections of his BKA since his initial surgery. He states that on 2-20-19 that an I&D was completed by Dr. Elvira Lazar (orthopedic surgery). Patient states that he noticed increased swelling of his RLE 2 days ago. He reports that home health nursing, who evaluates the patient daily, states that the patient wound was warm and presented with erythema causing him to be sent to the ED for evaluation. He rates his current pain as a 10/10 in severity and aching in character. He states that his wound is packed daily. Patient's denies any fever or chills. His infectious disease specialist is Dr. Ayaz Weber. The patient reports no additional symptoms or complaints at this time. \"-per er note and confirmed by myself    Addendum: patient is\"frustrated with the wound not healing, its getting worse\"        Past Medical History:          Diagnosis Date    Bipolar 2 disorder ([address]     previously followed with Dr. Isma Coyle and Steff Lowe in [facility] Diabetes mellitus type 2, uncontrolled ([address]     Diabetic foot ulcer ([address]     Diabetic polyneuropathy ([address]     Diabetic ulcer of right foot associated with type 2 diabetes mellitus ([address] 12/10/2015    Essential hypertension     \"never been on b/p medication that I know of\"    GERD (gastroesophageal reflux disease)     Hammer toe of left foot     Heart murmur     denies any chest pain or palpitations    History of tobacco abuse     Hx of BKA, right (HCC)     Macular edema, diabetic, bilateral ([address] 05/04/2018    Dr. 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 OFFICE/OUTPT [address] [facility] Right 9/20/2018    EXCISIONAL DEBRIDEMENT RIGHT BKA STUMP performed by Jana Sheth MD at [place] [address] Right 1/16/15    2nd toe with wound vac applied    WISDOM TOOTH EXTRACTION  ? when       Medications Prior to Admission:      Prior to Admission medications    Medication Sig Start Date End Date Taking? Authorizing Provider   insulin lispro (HUMALOG) 100 UNIT/ML injection vial Inject 18 Units into the skin 3 times daily (before meals)    Historical Provider, MD   mirtazapine (REMERON) 15 MG tablet Take 15 mg by mouth nightly as needed    Historical Provider, MD   amoxicillin-clavulanate (AUGMENTIN) 875-125 MG per tablet Take 1 tablet by mouth 2 times daily for 10 days 4/1/19 4/11/19  Jordan Metzger PA-C   oxyCODONE (OXY-IR) 10 MG immediate release tablet Take 0.5 tablets by mouth every 6 hours as needed for Pain for up to 3 doses.  4/1/19 4/4/19  Jordan Metzger PA-C   insulin glargine (BASAGLAR KWIKPEN) 100 UNIT/ML injection pen Inject 55 Units into the skin 2 times daily Historical Provider, MD   Continuous Blood Gluc  (FREESTYLE MARIANELA READER) LIANNE 1 Units by Does not apply route continuous 11/6/18   MARILYN Ugalde CNP   blood glucose monitor strips Accucheck Sheila Test Strips Test blood sugars 4 times daily DX:E11.65 10/23/18   MARILYN Ugalde CNP   acetaminophen (TYLENOL) 500 MG tablet Take 1,000 mg by mouth every 6 hours as needed for Pain    Historical Provider, MD   Lancets MISC Use to test blood sugars 4 times daily DX:E11.65 8/23/18   Uma Sanabria PA-C   Insulin Syringe-Needle U-100 29G X 1/2\" 0.3 ML MISC 1 each by Does not apply route 4 times daily (after meals and at bedtime) 8/23/18   Uma Sanabria PA-C   Insulin Pen Needle 32G X 4 MM MISC 1 each by Does not apply route daily 8/23/18   Uma Sanabria PA-C   sertraline (ZOLOFT) 100 MG tablet Take 1.5 tablets by mouth daily 4/23/18   MARILYN Ball CNP   Blood Glucose Monitoring Suppl LIANNE Use to test blood sugars DX:E11.65 4/17/18   MARILYN Ball CNP       Allergies:  Pcn [penicillins] and Clindamycin/lincomycin    Social History:      The patient currently lives home  TOBACCO:   reports that he quit smoking about 34 years ago. His smoking use included cigars. He has a 65.00 pack-year smoking history. He has never used smokeless tobacco.  ETOH:   reports that he drank alcohol. Family History:       Reviewed in detail and negative for DM, CAD, Cancer, CVA. Positive as follows:        Problem Relation Age of Onset    Diabetes Mother     Other Mother         pneumonia, H1N1    Depression Mother     Early Death Mother     High Blood Pressure Mother     High Cholesterol Mother     Vision Loss Maternal Grandmother     Arthritis Maternal Grandfather     Heart Disease Maternal Grandfather        Diet:  No diet orders on file    REVIEW OF SYSTEMS:   Pertinent positives as noted in the HPI. All other systems reviewed and negative.     PHYSICAL EXAM:    /78   Pulse 102 Temp 98.2 °F (36.8 °C) (Oral)   Resp 19   Ht 5' 6\" (1.676 m)   Wt 222 lb (100.7 kg)   SpO2 99%   BMI 35.83 kg/m²     General appearance:  No apparent distress, appears stated age and cooperative. HEENT:  Normal cephalic, atraumatic without obvious deformity. Pupils equal, round, and reactive to light. Extra ocular muscles intact. Conjunctivae/corneas clear. Neck: Supple, with full range of motion. no jugular venous distention. Trachea midline. no carotid bruits  Respiratory:  Normal respiratory effort. Clear to auscultation, bilaterally without Rales/Wheezes/Rhonchi. Breath sounds equal bilaterally  Cardiovascular:  Regular rate and rhythm with normal S1/S2 without murmurs, rubs or gallops. PMI non displaced  Abdomen: Soft, non-tender, non-distended with normal bowel sounds. No guarding, rebound. Musculoskeletal:  No clubbing, cyanosis or edema bilaterally. Full range of motion without deformity. erythema in stump and around wound  Skin: Skin color, texture, turgor normal.  No rashes or lesions, or suspicious lesions. Neurologic:  Neurovascularly intact without any focal sensory/motor deficits. Cranial nerves: II-XII intact, grossly non-focal.  Psychiatric:  Alert and oriented, thought content appropriate, normal insight  Capillary Refill: Brisk,< 2 seconds   Peripheral Pulses: +2 palpable, equal bilaterally upper and lower extremities  Lymphatics: no lymphadenopathy      Labs:     Recent Labs     04/01/19 1316 04/02/19  1626   WBC 14.5* 14.0*   HGB 13.2* 12.7*   HCT 38.6* 36.7*    250     Recent Labs     04/01/19  1316 04/02/19  1626   * 128*   K 4.4 4.2   CL 95* 92*   CO2 25 23   BUN 12 11   CREATININE 0.8 0.7   CALCIUM 9.2 9.1     Recent Labs     04/01/19  1316 04/02/19  1626   AST 9 9   ALT 8* 6*   BILIDIR  --  <0.2   BILITOT 0.4 0.5   ALKPHOS 84 84     No results for input(s): INR in the last 72 hours. No results for input(s): Delcie Socorro in the last 72 hours.     Urinalysis: minor errors which are inherent in voice recognition technology. ** Final report electronically signed by Dr. Chelle Jones on 4/1/2019 2:36 PM           DVT prophylaxis: [x] Lovenox                                 [] SCDs                                 [] SQ Heparin                                 [] Encourage ambulation           [] Already on Anticoagulation    Code Status: Prior      PT/OT Eval Status:     Disposition:    [x] Home       [] TCU       [] Rehab       [] Psych       [] SNF       [] [place]       [] Other-    ASSESSMENT:    C/Marlen Espinoza [address] Problems    Diagnosis Date Noted    Deep postoperative wound infection [T81.42XA] 04/02/2019    Type 2 diabetes mellitus with circulatory disorder, with long-term current use of insulin ([facility] [E11.59, Z79.4] 02/15/2019    Cellulitis of right lower extremity [L03.115] 02/15/2019    Essential hypertension [I10]     Infection of below knee amputation stump (HCC) [T87.40]     Obesity (BMI 30-39. 9) [E66.9]     Bipolar affective disorder ([facility] [F31.9]        PLAN:    1. Admit  2. Ortho consulted  3. ivf  4. Iv abx  5. Resume home meds        Thank you Chadwick Bell APRN - CNP for the opportunity to be involved in this patient's care.     Electronically signed by Kasia Fiore DO on 4/2/2019 at 6:30 PM

## 2019-04-03 ENCOUNTER — APPOINTMENT (OUTPATIENT)
Dept: MRI IMAGING | Age: 49
DRG: 493 | End: 2019-04-03
Payer: MEDICARE

## 2019-04-03 PROBLEM — T87.43 RIGHT BKA INFECTION (HCC): Status: ACTIVE | Noted: 2019-02-15

## 2019-04-03 LAB
ANION GAP SERPL CALCULATED.3IONS-SCNC: 10 MEQ/L (ref 8–16)
BASOPHILS # BLD: 0.5 %
BASOPHILS ABSOLUTE: 0.1 THOU/MM3 (ref 0–0.1)
BUN BLDV-MCNC: 10 MG/DL (ref 7–22)
CALCIUM SERPL-MCNC: 8.6 MG/DL (ref 8.5–10.5)
CHLORIDE BLD-SCNC: 99 MEQ/L (ref 98–111)
CO2: 24 MEQ/L (ref 23–33)
CREAT SERPL-MCNC: 0.7 MG/DL (ref 0.4–1.2)
EOSINOPHIL # BLD: 3.7 %
EOSINOPHILS ABSOLUTE: 0.4 THOU/MM3 (ref 0–0.4)
ERYTHROCYTE [DISTWIDTH] IN BLOOD BY AUTOMATED COUNT: 15.1 % (ref 11.5–14.5)
ERYTHROCYTE [DISTWIDTH] IN BLOOD BY AUTOMATED COUNT: 47.8 FL (ref 35–45)
GFR SERPL CREATININE-BSD FRML MDRD: > 90 ML/MIN/1.73M2
GLUCOSE BLD-MCNC: 270 MG/DL (ref 70–108)
GLUCOSE BLD-MCNC: 288 MG/DL (ref 70–108)
GLUCOSE BLD-MCNC: 291 MG/DL (ref 70–108)
GLUCOSE BLD-MCNC: 331 MG/DL (ref 70–108)
GLUCOSE BLD-MCNC: 333 MG/DL (ref 70–108)
HCT VFR BLD CALC: 35.2 % (ref 42–52)
HEMOGLOBIN: 12.1 GM/DL (ref 14–18)
IMMATURE GRANS (ABS): 0.08 THOU/MM3 (ref 0–0.07)
IMMATURE GRANULOCYTES: 0.8 %
LYMPHOCYTES # BLD: 21.9 %
LYMPHOCYTES ABSOLUTE: 2.3 THOU/MM3 (ref 1–4.8)
MAGNESIUM: 2 MG/DL (ref 1.6–2.4)
MCH RBC QN AUTO: 29.6 PG (ref 26–33)
MCHC RBC AUTO-ENTMCNC: 34.4 GM/DL (ref 32.2–35.5)
MCV RBC AUTO: 86.1 FL (ref 80–94)
MONOCYTES # BLD: 9.4 %
MONOCYTES ABSOLUTE: 1 THOU/MM3 (ref 0.4–1.3)
NUCLEATED RED BLOOD CELLS: 0 /100 WBC
PLATELET # BLD: 222 THOU/MM3 (ref 130–400)
PMV BLD AUTO: 9.4 FL (ref 9.4–12.4)
POTASSIUM REFLEX MAGNESIUM: 4.2 MEQ/L (ref 3.5–5.2)
RBC # BLD: 4.09 MILL/MM3 (ref 4.7–6.1)
SEG NEUTROPHILS: 63.7 %
SEGMENTED NEUTROPHILS ABSOLUTE COUNT: 6.6 THOU/MM3 (ref 1.8–7.7)
SODIUM BLD-SCNC: 133 MEQ/L (ref 135–145)
WBC # BLD: 10.4 THOU/MM3 (ref 4.8–10.8)

## 2019-04-03 PROCEDURE — 97530 THERAPEUTIC ACTIVITIES: CPT

## 2019-04-03 PROCEDURE — 6370000000 HC RX 637 (ALT 250 FOR IP): Performed by: INTERNAL MEDICINE

## 2019-04-03 PROCEDURE — 1200000000 HC SEMI PRIVATE

## 2019-04-03 PROCEDURE — 2500000003 HC RX 250 WO HCPCS: Performed by: INTERNAL MEDICINE

## 2019-04-03 PROCEDURE — 82948 REAGENT STRIP/BLOOD GLUCOSE: CPT

## 2019-04-03 PROCEDURE — 6360000002 HC RX W HCPCS: Performed by: INTERNAL MEDICINE

## 2019-04-03 PROCEDURE — 6370000000 HC RX 637 (ALT 250 FOR IP): Performed by: NURSE PRACTITIONER

## 2019-04-03 PROCEDURE — 97162 PT EVAL MOD COMPLEX 30 MIN: CPT

## 2019-04-03 PROCEDURE — 99233 SBSQ HOSP IP/OBS HIGH 50: CPT | Performed by: INTERNAL MEDICINE

## 2019-04-03 PROCEDURE — 85025 COMPLETE CBC W/AUTO DIFF WBC: CPT

## 2019-04-03 PROCEDURE — 83735 ASSAY OF MAGNESIUM: CPT

## 2019-04-03 PROCEDURE — 73718 MRI LOWER EXTREMITY W/O DYE: CPT

## 2019-04-03 PROCEDURE — 36415 COLL VENOUS BLD VENIPUNCTURE: CPT

## 2019-04-03 PROCEDURE — 2580000003 HC RX 258: Performed by: INTERNAL MEDICINE

## 2019-04-03 PROCEDURE — 80048 BASIC METABOLIC PNL TOTAL CA: CPT

## 2019-04-03 PROCEDURE — 6360000002 HC RX W HCPCS: Performed by: PHARMACIST

## 2019-04-03 PROCEDURE — 97110 THERAPEUTIC EXERCISES: CPT

## 2019-04-03 PROCEDURE — 97165 OT EVAL LOW COMPLEX 30 MIN: CPT

## 2019-04-03 PROCEDURE — 2580000003 HC RX 258: Performed by: PHARMACIST

## 2019-04-03 RX ORDER — INSULIN GLARGINE 100 [IU]/ML
30 INJECTION, SOLUTION SUBCUTANEOUS NIGHTLY
Status: DISCONTINUED | OUTPATIENT
Start: 2019-04-03 | End: 2019-04-04

## 2019-04-03 RX ORDER — HYDROCODONE BITARTRATE AND ACETAMINOPHEN 5; 325 MG/1; MG/1
2 TABLET ORAL EVERY 4 HOURS PRN
Status: DISCONTINUED | OUTPATIENT
Start: 2019-04-03 | End: 2019-04-10 | Stop reason: HOSPADM

## 2019-04-03 RX ORDER — HYDROCODONE BITARTRATE AND ACETAMINOPHEN 5; 325 MG/1; MG/1
1 TABLET ORAL EVERY 4 HOURS PRN
Status: DISCONTINUED | OUTPATIENT
Start: 2019-04-03 | End: 2019-04-10 | Stop reason: HOSPADM

## 2019-04-03 RX ADMIN — MIRTAZAPINE 15 MG: 15 TABLET, FILM COATED ORAL at 21:01

## 2019-04-03 RX ADMIN — Medication 10 ML: at 21:01

## 2019-04-03 RX ADMIN — CEFTRIAXONE SODIUM 2 G: 2 INJECTION, POWDER, FOR SOLUTION INTRAMUSCULAR; INTRAVENOUS at 21:26

## 2019-04-03 RX ADMIN — FAMOTIDINE 20 MG: 20 TABLET ORAL at 21:01

## 2019-04-03 RX ADMIN — INSULIN LISPRO 6 UNITS: 100 INJECTION, SOLUTION INTRAVENOUS; SUBCUTANEOUS at 21:33

## 2019-04-03 RX ADMIN — FAMOTIDINE 20 MG: 20 TABLET ORAL at 08:53

## 2019-04-03 RX ADMIN — HYDROCODONE BITARTRATE AND ACETAMINOPHEN 2 TABLET: 5; 325 TABLET ORAL at 22:00

## 2019-04-03 RX ADMIN — INSULIN LISPRO 9 UNITS: 100 INJECTION, SOLUTION INTRAVENOUS; SUBCUTANEOUS at 11:41

## 2019-04-03 RX ADMIN — AZTREONAM 1 G: 1 INJECTION, POWDER, LYOPHILIZED, FOR SOLUTION INTRAMUSCULAR; INTRAVENOUS at 17:55

## 2019-04-03 RX ADMIN — HYDROCODONE BITARTRATE AND ACETAMINOPHEN 1 TABLET: 5; 325 TABLET ORAL at 08:55

## 2019-04-03 RX ADMIN — SERTRALINE 150 MG: 100 TABLET, FILM COATED ORAL at 08:53

## 2019-04-03 RX ADMIN — INSULIN LISPRO 9 UNITS: 100 INJECTION, SOLUTION INTRAVENOUS; SUBCUTANEOUS at 08:53

## 2019-04-03 RX ADMIN — HYDROCODONE BITARTRATE AND ACETAMINOPHEN 2 TABLET: 5; 325 TABLET ORAL at 17:54

## 2019-04-03 RX ADMIN — VANCOMYCIN HYDROCHLORIDE 1500 MG: 1 INJECTION, POWDER, LYOPHILIZED, FOR SOLUTION INTRAVENOUS at 12:15

## 2019-04-03 RX ADMIN — INSULIN LISPRO 12 UNITS: 100 INJECTION, SOLUTION INTRAVENOUS; SUBCUTANEOUS at 19:24

## 2019-04-03 RX ADMIN — AZTREONAM 1 G: 1 INJECTION, POWDER, LYOPHILIZED, FOR SOLUTION INTRAMUSCULAR; INTRAVENOUS at 04:35

## 2019-04-03 RX ADMIN — INSULIN GLARGINE 30 UNITS: 100 INJECTION, SOLUTION SUBCUTANEOUS at 21:34

## 2019-04-03 ASSESSMENT — PAIN SCALES - GENERAL
PAINLEVEL_OUTOF10: 6
PAINLEVEL_OUTOF10: 0
PAINLEVEL_OUTOF10: 6
PAINLEVEL_OUTOF10: 7
PAINLEVEL_OUTOF10: 10
PAINLEVEL_OUTOF10: 6

## 2019-04-03 NOTE — CARE COORDINATION
4/3/19, 7:38 AM      Mar Rg       Admitted from: ED    4/2/2019/ [address] [place] [address] day: 1   [facility]: [facility] Reason for admit: Deep postoperative wound infection [T81.42XA] Status: Inpatient    Admit order signed?: yes  PMH:  has a past medical history of Bipolar 2 disorder ([address] Diabetes mellitus type 2, uncontrolled ([address] Diabetic foot ulcer ([address] Diabetic polyneuropathy ([address] Diabetic ulcer of right foot associated with type 2 diabetes mellitus ([address] Essential hypertension, GERD (gastroesophageal reflux disease), Hammer toe of left foot, Heart murmur, History of tobacco abuse, Hx of BKA, right ([address] Macular edema, diabetic, bilateral ([address] Marijuana abuse in remission, Onychomycosis, and 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 Procedure: na    Pertinent abnormal Imaging:  na  Medications:  Scheduled Meds:   mirtazapine  15 mg Oral Nightly    sertraline  150 mg Oral Daily    sodium chloride flush  10 mL Intravenous 2 times per day    enoxaparin  40 mg Subcutaneous Q24H    famotidine  20 mg Oral BID    insulin lispro  0-18 Units Subcutaneous TID WC    insulin lispro  0-9 Units Subcutaneous Nightly    aztreonam  1 g Intravenous Q8H    vancomycin (VANCOCIN) intermittent dosing (placeholder)   Other RX Placeholder    vancomycin  1,500 mg Intravenous Q12H     Continuous Infusions:   dextrose      sodium chloride 75 mL/hr at 04/02/19 2100      Pertinent Info/Orders/Treatment Plan:  IVF, accu checks as ordered, IV antibiotics,       Diet: DIET CARB CONTROL;   Smoking status:  reports that he quit smoking about 34 years ago. His smoking use included cigars. He has a 65.00 pack-year smoking history. He has never used smokeless tobacco.   PCP: MARILYN Patterson CNP  Readmission: yes    Patient has been readmitted within   21  days. Patient went to f/u appointment? yes  If yes, was it within 7 days? yes  Patient was able to fill prescriptions?  yes  Patient is taking medications as prescribed? yes  Cause for readmission? Infected amputee site    Readmission Risk Score: 38%    Discharge Planning  Current Residence:  Shelter  Living Arrangements:  Other (Comment)(shelter)   Support Systems:  Friends/Neighbors, [facility])  Current Services PTA:   [facility]  Potential Assistance Needed:  Home Care  Potential Assistance Purchasing Medications:  Yes  Does patient want to participate in local refill/ meds to beds program?  Yes  Type of Home Care Services:  Nursing Services([facility] home care)  Patient expects to be discharged to:  [facility] [place], [facility]  Expected Discharge date:  04/05/19  Follow Up Appointment: Best Day/ Time: Tuesday PM    Discharge Plan: Met with Bonnie Reeder; he is current with [facility]. Spoke with Carri from [facility] so she was aware. Patient wheel chair is in poor condition; PT called this CM and suggested we assist with new W/C. Spoke with Lashanda Argueta and called [facility] at [phone] - no answer so left message. Patient shared he no longer uses NOLBERTO Ratliff CNP as his PCP; new PCP list given to patient.            Evaluation: yes

## 2019-04-03 NOTE — CONSULTS
Orthopedic Consult    Requesting Physician: Dr. Nathalia Obrien:  Right stump cellulitis    HISTORY OF PRESENT ILLNESS:      The patient is a 44 y.o. male  With multiple medical comorbidities who was admitted last evening for cellulitis of the right stump. Patient known to our practice, he underwent I and D 02/15/2019 with Dr. Adrianna Mena, has been following with Dr. Martha Jarrett and wound care by home health. He has been taking PO abx. And doing reasonably well. Patient states he developed more redness and spiked a fever yesterday prompting him to go to the ER. Temp max 101. He denies increased drainage, numbness, tingling, nausea, vomiting. CT right tib/fib shows no evidence of osteomyelitis or drainable fluid pocket, tunneling wound at the head of the fibula. +wound culture.  He has been afebrile, being treated with Vancomycin and Azactam.       Past Medical History:    Past Medical History:   Diagnosis Date    Bipolar 2 disorder ([address]     previously followed with Dr. Nella Selby and Kenyetta Smith in [facility] Diabetes mellitus type 2, uncontrolled ([address]     Diabetic foot ulcer ([address]     Diabetic polyneuropathy ([address]     Diabetic ulcer of right foot associated with type 2 diabetes mellitus ([address] 12/10/2015    Essential hypertension     \"never been on b/p medication that I know of\"    GERD (gastroesophageal reflux disease)     Hammer toe of left foot     Heart murmur     denies any chest pain or palpitations    History of tobacco abuse     Hx of BKA, right (HCC)     Macular edema, diabetic, bilateral ([address] 05/04/2018    Dr. 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 ([address] 6/29/2016       Past Surgical History:    Past Surgical History:   Procedure Laterality Date    ABSCESS DRAINAGE Right     foot    FOOT DEBRIDEMENT Right 07/01/2016    I & D    INCISION AND DRAINAGE Right 2/18/2019    I&D RIGHT STUMP performed by Marisela Goldman MD at [address] [facility] Right 07/20/2016    OTHER SURGICAL HISTORY Right 1/14/15    sole of foot I&D    VT DRAIN INFECT SHOULDER BURSA Left 8/18/2017    LEFT SHOULDER INCISION AND DRAINAGE performed by Marisela Goldman MD at [address]PT [address] [facility] [address] Right 9/20/2018    EXCISIONAL DEBRIDEMENT RIGHT BKA STUMP performed by Vicky Soares MD at [address] Right 1/16/15    2nd toe with wound vac applied    WISDOM TOOTH EXTRACTION  ? when       Medications Prior to Admission:   Current Facility-Administered Medications   Medication Dose Route Frequency Provider Last Rate Last Dose    mirtazapine (REMERON) tablet 15 mg  15 mg Oral Nightly Gee Cristianine, DO   15 mg at 04/02/19 2056    sertraline (ZOLOFT) tablet 150 mg  150 mg Oral Daily Gee Cristianine, DO        sodium chloride flush 0.9 % injection 10 mL  10 mL Intravenous 2 times per day Gerard Leon, DO        sodium chloride flush 0.9 % injection 10 mL  10 mL Intravenous PRN Gerard Fosterine, DO        magnesium hydroxide (MILK OF MAGNESIA) 400 MG/5ML suspension 30 mL  30 mL Oral Daily PRN Gee Bonine, DO        ondansetron [facility]) injection 4 mg  4 mg Intravenous Q6H PRN Gee Bonine, DO        enoxaparin (LOVENOX) injection 40 mg  40 mg Subcutaneous Q24H Franck Shepard, DO   40 mg at 04/02/19 2056    glucose (GLUTOSE) 40 % oral gel 15 g  15 g Oral PRN Franck Tompkinsbeck, DO        dextrose 50 % solution 12.5 g  12.5 g Intravenous PRN Gerard Fosterine, DO        glucagon (rDNA) injection 1 mg  1 mg Intramuscular PRN Gee Bonine, DO        dextrose 5 % solution  100 mL/hr Intravenous PRN Gerard Fosterine, DO        0.9 % sodium chloride infusion   Intravenous Continuous Franck Shepard, DO 75 mL/hr at 04/02/19 2100      magnesium sulfate 1 g in dextrose 5 % 100 mL IVPB  1 g Intravenous PRN Gerard Fosterine, DO       [facility] potassium chloride (KLOR-CON M) extended release tablet 40 mEq  40 mEq Oral PRN Mayra Seek, DO        Or    potassium bicarb-citric acid (EFFER-K) effervescent tablet 40 mEq  40 mEq Oral PRN Mayra Seek, DO        Or    potassium chloride 10 mEq/100 mL IVPB (Peripheral Line)  10 mEq Intravenous PRN Mayra Seek, DO        famotidine (PEPCID) tablet 20 mg  20 mg Oral BID Mayra Seek, DO   20 mg at 19    acetaminophen (TYLENOL) tablet 650 mg  650 mg Oral Q4H PRN Mayra Seek, DO        insulin lispro (HUMALOG) injection vial 0-18 Units  0-18 Units Subcutaneous TID WC Franck Shepard, DO        insulin lispro (HUMALOG) injection vial 0-9 Units  0-9 Units Subcutaneous Nightly Mayra Seek, DO   6 Units at 19    aztreonam (AZACTAM) 1 g IVPB minibag  1 g Intravenous Q8H Mayra Seek, DO   Stopped at 19    vancomycin (VANCOCIN) intermittent dosing (placeholder)   Other RX Placeholder Mayra Seek, DO        vancomycin (VANCOCIN) 1,500 mg in dextrose 5 % 500 mL IVPB  1,500 mg Intravenous Q12H Frantz Hoang [facility]           Allergies:  Pcn [penicillins] and Clindamycin/lincomycin    Social History:   Social History     Tobacco Use   Smoking Status Former Smoker    Packs/day: 5.00    Years: 13.00    Pack years: 65.00    Types: Cigars    Last attempt to quit: Santosh Bighorn Years since quittin.2   Smokeless Tobacco Never Used     Social History     Substance and Sexual Activity   Alcohol Use Not Currently    Alcohol/week: 0.0 oz     Social History     Substance and Sexual Activity   Drug Use No    Comment: as a teenager only       Family History:  Family History   Problem Relation Age of Onset    Diabetes Mother     Other Mother         pneumonia, H1N1    Depression Mother     Early Death Mother     High Blood Pressure Mother     High Cholesterol Mother     Vision Loss Maternal Grandmother     Arthritis Maternal Grandfather     Heart Disease Maternal Grandfather        REVIEW OF SYSTEMS:  Constitutional: Denies any fever, chills. Derm: Denies any rash or skin color change. Eyes: Denies blurred or decreased in vision. Ent: Denies any tinnitus or vertigo. Resp: Denies any cough or shortness of breath. CV: Denies any syncope, palpitations or chest pain. GI:  Denies any abdominal pain, nausea, vomiting, constipation or diarrhea. : Denies any hematuria, hesitancy, or dysuria. Heme/Lymph: Denies any bleeding. Musculoskeletal: Positive for right stump erythema and tenderness. Neuro: Denies any dizziness, paresthesia or weakness. PHYSICAL EXAM:  Patient Vitals for the past 24 hrs:   BP Temp Temp src Pulse Resp SpO2 Height Weight   04/03/19 0431 125/67 97.7 °F (36.5 °C) Oral 92 18 95 % -- --   04/02/19 2302 (!) 126/59 98.4 °F (36.9 °C) Oral 98 18 95 % -- --   04/02/19 2003 (!) 142/74 98.5 °F (36.9 °C) Oral 97 18 98 % 5' 6\" (1.676 m) 222 lb (100.7 kg)   04/02/19 1836 126/66 -- -- 106 18 99 % -- --   04/02/19 1731 136/78 -- -- 102 19 99 % -- --   04/02/19 1650 128/77 -- -- 100 16 99 % -- --   04/02/19 1609 (!) 141/64 98.2 °F (36.8 °C) Oral 103 18 98 % 5' 6\" (1.676 m) 222 lb (100.7 kg)     General appearance:  Alert and oriented x 3. No apparent distress, appears stated age and cooperative. HEENT:  Normal cephalic, atraumatic without obvious deformity. Pupils equal, round, and reactive to light. Conjunctivae/corneas clear. Neck: Supple, with full range of motion. No jugular venous distention. Trachea midline. Respiratory:  Normal respiratory effort. No audible Wheezes or Rhonchi. Cardiovascular:  Regular rate and rhythm. Abdomen: Soft, non-tender, non-distended. Musculoskeletal:  Right stump slight erythema, TTP. Flex and extends his stump. No odor, drainage or warmth. Wound posterolateral aspect of fibula, 2 cm diameter and tunneling at 12. Skin: Skin color, texture, turgor normal.  No rashes or lesions.   Neurologic:  Neurovascularly intact noted subcutaneous emphysema on the prior study has resolved. .     1. No evidence for osteomyelitis. Persistent findings of cellulitis involving the stump. No abscess or drainable fluid collection is seen. 2. Note that there is a deep wound along the lateral aspect of the fibular head, which was not present previously, suggesting possible wound dehiscence. Clinical correlation recommended. **This report has been created using voice recognition software. It may contain minor errors which are inherent in voice recognition technology. ** Final report electronically signed by Dr. Chelle Jones on 4/1/2019 2:36 PM      ASSESSMENT:Principal Problem:    Infection of below knee amputation stump ([facility]  Active Problems:    Bipolar affective disorder ([facility]    Obesity (BMI 30-39. 9)    Essential hypertension    Cellulitis of right lower extremity    Type 2 diabetes mellitus with circulatory disorder, with long-term current use of insulin (HCC)    Deep postoperative wound infection  Resolved Problems:    * No resolved hospital problems. *       PLAN as discussed with Dr. Velasquez Cleverly:    MRI of right tib/fib. Pending results, will make NPO after MN for possible I and D vs AKA. Consult ID.  Pain management per primary team.     Electronically signed by MARILYN Ryan CNP on 4/3/2019 at 7:53 AM

## 2019-04-03 NOTE — PLAN OF CARE
Problem: Discharge Planning:  Goal: Participates in care planning  Description  Participates in care planning  Outcome: Ongoing  Note:   Pt participates in care planning to the best of ability. Will continue to include in care planning. Goal: Discharged to appropriate level of care  Description  Discharged to appropriate level of care  Outcome: Ongoing  Note:   Pt plans to be discharged to [facility], where he lives. Will continue to monitor for further discharge needs. Problem: Activity Intolerance:  Goal: Ability to tolerate increased activity will improve  Description  Ability to tolerate increased activity will improve  Outcome: Ongoing  Note:   Pt able to transfer self from bed to chair without staff assistance. Pt uses wheelchair at home. Will continue to monitor. Problem: Anxiety/Stress:  Goal: Level of anxiety will decrease  Description  Level of anxiety will decrease  Outcome: Ongoing  Note:   Some anxiety expressed this shift. Pt updated on plan of care. Will continue to monitor. Problem: Pain:  Goal: Pain level will decrease  Description  Pain level will decrease  Outcome: Ongoing  Note:   Pt denied pain this shift. Will continue to monitor and manage pain appropriately. Problem: Serum Glucose Level - Abnormal:  Goal: Ability to maintain appropriate glucose levels will improve to within specified parameters  Description  Ability to maintain appropriate glucose levels will improve to within specified parameters  Outcome: Ongoing  Note:   Pt glucose 361 this shift. Pt covered with 6 units of Humalog per nightly sliding scale. Will continue to monitor.      Problem: Skin Integrity - Impaired:  Goal: Will show no infection signs and symptoms  Description  Will show no infection signs and symptoms  Outcome: Ongoing  Note:   Vitals:    04/02/19 2302   BP: (!) 126/59   Pulse: 98   Resp: 18   Temp: 98.4 °F (36.9 °C)   SpO2: 95%       Pt on vancomycin IV and Azactam IV for right lower extremity stump infection. Will continue to monitor. Goal: Absence of new skin breakdown  Description  Absence of new skin breakdown  Outcome: Ongoing  Note:   No new signs of skin breakdown this shift. Pt is continent and able to turn self. Will continue to monitor. Problem: Infection:  Goal: Will remain free from infection  Description  Will remain free from infection  Outcome: Ongoing  Note:   Vitals:    04/02/19 2302   BP: (!) 126/59   Pulse: 98   Resp: 18   Temp: 98.4 °F (36.9 °C)   SpO2: 95%     Pt on vancomycin IV and Azactam IV for right lower extremity stump infection. Will continue to monitor. Problem: Daily Care:  Goal: Daily care needs are met  Description  Daily care needs are met  Outcome: Ongoing  Note:   Daily care needs are met through purposeful hourly rounding. Will continue to monitor. Problem: Falls - Risk of:  Goal: Will remain free from falls  Description  Will remain free from falls  Outcome: Ongoing  Note:   Pt free from falls this shift. Bed alarm on, 2/4 side rails up, call light in reach, fall band on, nonskid socks on, clear pathway, bed in locked and lowest position. Will continue to monitor. Goal: Absence of physical injury  Description  Absence of physical injury  Outcome: Ongoing  Note:   Pt free from physical injury this shift. Bed alarm on, 2/4 side rails up, call light in reach, fall band on, nonskid socks on, clear pathway, bed in locked and lowest position. Will continue to monitor. Care plan reviewed with patient. Patient verbalizes understanding of the plan of care and contribute to goal setting.

## 2019-04-03 NOTE — PROGRESS NOTES
Wound ostomy consulted for stump wound. Pt established pt of Dr. Martha Jarrett who is currently on case. Dr Martha Jarrett will recommend dressing treatment. Call wound ostomy if needed.

## 2019-04-03 NOTE — PROGRESS NOTES
[facility]  [facility]PAT[facility]ERAPY  [facility] ORTHOPEDICS 7K  EVALUATION    Time:  Time In: 8830  Time Out: 1412  Timed Code Treatment Minutes: 17 Minutes  Minutes: 27    Date: 4/3/2019  Patient Name: Arthur Gtz,   Gender: male      MRN: [id number]  : 1970  (44 y.o.)  Referring Practitioner: [facility],   Diagnosis: Deep postoperative wound infection   Additional Pertinent Hx: 44 y.o. male who presented to [address] [place] [address] with \"evaluation of a wound infection. The patient states that his RLE BKA was completed on . Patient admits to have experiencing multiple infections of his BKA since his initial surgery. He states that on 19 that an I&D was completed by Dr. Chato Bellamy (orthopedic surgery). Patient states that he noticed increased swelling of his RLE 2 days ago. He reports that home health nursing, who evaluates the patient daily, states that the patient wound was warm and presented with erythema causing him to be sent to the ED for evaluation. He rates his current pain as a 10/10 in severity and aching in character. He states that his wound is packed daily. Patient's denies any fever or chills. His infectious disease specialist is Dr. Hipolito Hill. The patient reports no additional symptoms or complaints at this time. \"-per er note and confirmed by myself    Restrictions/Precautions:  General Precautions(Right BKA)      Past Medical History:   Diagnosis Date    Bipolar 2 disorder ([facility] [address]     previously followed with Dr. Zain Muir and Isaura Han in [facility] Diabetes mellitus type 2, uncontrolled ([address]     Diabetic foot ulcer ([address]     Diabetic polyneuropathy ([address]     Diabetic ulcer of right foot associated with type 2 diabetes mellitus ([address] 12/10/2015    Essential hypertension     \"never been on b/p medication that I know of\"    GERD (gastroesophageal reflux disease)     Hammer toe of left foot     Heart murmur     denies any chest pain or palpitations    History of tobacco abuse     Hx of BKA, right (HCC)     Macular edema, diabetic, bilateral ([address] 05/04/2018    Dr. Noah Chicas referred to retina specialist for 2nd opinion    Marijuana abuse in remission     Onychomycosis     WD-Skin ulcer of fourth toe of right foot with necrosis of bone ([facility] [address] 6/29/2016     Past Surgical History:   Procedure Laterality Date    ABSCESS DRAINAGE Right     foot    FOOT DEBRIDEMENT Right 07/01/2016    I & D    INCISION AND DRAINAGE Right 2/18/2019    I&D RIGHT STUMP performed by Cahy Alexander MD at [address] [facility] Right 07/20/2016    OTHER SURGICAL HISTORY Right 1/14/15    sole of foot I&D    RI DRAIN INFECT SHOULDER BURSA Left 8/18/2017    LEFT SHOULDER INCISION AND DRAINAGE performed by Chay Alexander MD at [facility] OFFICE/OUTPT [address] Right 9/20/2018    EXCISIONAL DEBRIDEMENT RIGHT BKA STUMP performed by Jabier Baldwin MD at [address] Right 1/16/15    2nd toe with wound vac applied    WISDOM TOOTH EXTRACTION  ? when           Subjective  Chart Reviewed: Yes  Patient assessed for rehabilitation services?: Yes    Subjective: RN okayed OT session. Upon arrival patient was sitting in w/c. Pt was agreeable to OT session. General:  Overall Orientation Status: Within Functional Limits    Vision: Within Functional Limits    Hearing: Within functional limits    Pain:  Pain Assessment  Patient Currently in Pain: Denies     Social/Functional History:  Lives With: (Roomate )  Type of Home: House([facility])  Home Layout: One level  Home Access: Ramped entrance  Home Equipment: Rolling walker, Wheelchair-manual(Pt needs new WC, current WC was donated from a friend and is not in good shape.  Pt will benefit from [facility] fitting in an outpatient setting.)     Bathroom Shower/Tub: Tub/Shower unit  Friendship Toilet: Standard  Bathroom Equipment: Shower chair       ADL Assistance: Independent  Homemaking Assistance: Independent       Ambulation Assistance: Independent(mostly uses WC)  Transfer Assistance: Independent(short distance with 2WW)    Active : No     Additional Comments: Pt reports I prior to admission. Pt was recently d/c from TCU at the beginning of March. Pt notes he was mobilizing while prior to recent admission. Objective        Overall Cognitive Status: WFL          LUE AROM (degrees)  LUE AROM : WNL     RUE AROM (degrees)  RUE AROM : WNL     LUE Strength  Gross LUE Strength: WNL  L Hand Grasp: 5/5  L Hand Release: 5/5     RUE Strength  Gross RUE Strength: WNL  R Hand Grasp: 5/5  R Hand Release: 5/5    ADL  Grooming: Modified independent (at w/c level )     Transfers  Stand Pivot Transfers: Modified independent    Balance  Sitting Balance: Modified independent        Functional Mobility  Functional - Mobility Device: Wheelchair  Activity: Other; To/from bathroom  Assist Level: Modified independent   Functional Mobility Comments: Pt completed w/c mobility over 1,000 feet, Mod Indep. Activity Tolerance:  Activity Tolerance: Patient Tolerated treatment well    Treatment Initiated:  Pt completed B UE exs to increase strength required to complete ADL routine, x 1 set, x 20 reps, 5lb resistance: shoulder flexion, chest press, bicep curls, horizontal AB/ADduction. slow pace, exhibits min fatigue, requires 0 RBs during exs. Assessment:  Assessment: This 44year old male is at baseline and requires no further OT. Prognosis: Good    Clinical Decision Making: Clinical Decision making was of Low Complexity as the result of analysis of data from a problem focused assessment, a consideration of a limited number of treatment options, no significant comorbidities affecting the plan of care and no modification or assistance required to complete the evaluation.     Discharge Recommendations:  Discharge Recommendations: Home with assist PRN    Patient Education:  Patient Education: OT Role, OT POC, Transfer safety, HEP. Barriers to Learning: None    Equipment Recommendations:  Equipment Needed: No    Safety:  Safety Devices in place: Yes  Type of devices: Gait belt, Patient at risk for falls(left in w/c. )    Plan:  Times per week: N/A    Goals:  Patient goals : Go Home    Short term goals  Time Frame for Short term goals: N/A-pt is at baseline. Evaluation Complexity: Based on the findings of patient history, examination, clinical presentation, and decision making during this evaluation, this patient is of low complexity.

## 2019-04-03 NOTE — PROGRESS NOTES
Pharmacy Note  Vancomycin Consult    Yolanda Jerome is a 44 y.o. male started on Vancomycin for cellulits of RLE; consult received from Dr. Jin Glass to manage therapy. Also receiving the following antibiotics: aztreonam.    Patient Active Problem List   Diagnosis    Status post below knee amputation of right lower extremity (HCC)    Gait disturbance    Sebaceous cyst    Uncontrolled type 2 diabetes mellitus with hyperglycemia, with long-term current use of insulin ([facility])    Severe bipolar II disorder, recent episode major depressive, remission ([facility])    Bipolar affective disorder ([facility])    Leukocytosis    Normocytic anemia    Obesity (BMI 30-39. 9)    Infection of below knee amputation stump ([address]    History of noncompliance with medical treatment    Essential hypertension    Diabetic ulcer of lower leg ([address]    History of right below knee amputation ([address]    Tobacco dependence    History of osteomyelitis    Gastroesophageal reflux disease without esophagitis    History of marijuana use    Abscess of right leg    Physical debility    Diabetic ulcer of left fifth toe ([facility])    Cellulitis of fifth toe, left    Amputation stump infection ([address]    Cellulitis of right lower extremity    Anemia    Electrolyte imbalance    Type 2 diabetes mellitus with circulatory disorder, with long-term current use of insulin ([facility])    Weakness    Deep postoperative wound infection       Allergies:  Pcn [penicillins] and Clindamycin/lincomycin     Temp max: 98.5    Recent Labs     04/01/19  1316 04/02/19  1626   BUN 12 11       Recent Labs     04/01/19  1316 04/02/19  1626   CREATININE 0.8 0.7       Recent Labs     04/01/19  1316 04/02/19  1626   WBC 14.5* 14.0*         Intake/Output Summary (Last 24 hours) at 4/2/2019 2016  Last data filed at 4/2/2019 1755  Gross per 24 hour   Intake 1914 ml   Output --   Net 1914 ml       Culture Date Source Results   4/2/19  Blood x 2                         Ht Readings from Last 1 Encounters:   04/02/19

## 2019-04-03 NOTE — PROGRESS NOTES
Patient up in chair watching TV. No needs requested at this time, call light within reach.  Retha Collet

## 2019-04-03 NOTE — PROGRESS NOTES
Hospitalist Progress Note    Patient:  Martin Setting  YOB: 1970  MRN: [id number]   PCP: No primary care provider on file. Acct: [de-identified]  Unit/Bed: 7K-18/018-A    Date of Admission: 4/2/2019      ASSESSMENT       1. Diabetic ulcer, cellulitis/myostiis, acute on chronic osteomyelitis of the fibula of right lower extremity at BKA  1. Seen on MRI on 4/3  2. Followed by Dr. Jamir Dye  3. Previous cxs on 4/1show Group B strep  4. On aztreonam and Vancomycin and antibiotic changed to Ceftriaxone on 4/3  5. Wound care  6. Per Dr. Stu Baez, extended care will best be performed at [facility] or [facility]  2. Uncontrolled DM Type 2, HgbA1C 9.1  1. Introduce Lantus at 30 units qhs and titrate upwards to home dose of 55 units bid, especially since may be NPO for procedure tomorrow  3. Chronic co-morbidities:  1. Essential HTN  2. Bipolar disorder  3. History of R BKA    PLAN     1. Lantus 30 units qhs for now   2. Stop IV fluids  3. Awaiting Orthopedic Surgery consult  4. Dr. Stu Baez, ID following. Continue IV antibiotics per Dr. Stu Baez      Anticipated Discharge in : 3 to 7 days    Code Status: Full Code    Electronically signed by Dong Wallace MD on 4/3/2019 at 11:03 PM      Chief Complaint     Right BKA  Infection      SUBJECTIVE     The patient is a 44 y.o. Nelda Never yo male who was admitted on 4/2/2019 for increased erythema, swelling and pain at right BKA for 3 days. Previous cxs grew Group B Strep    6/10 pain at right lower extremity at stump. No pain at knee joint no swelling at knee joint.     OBJECTIVE     Medications:  Reviewed    Infusion Medications    dextrose       Scheduled Medications    insulin glargine  30 Units Subcutaneous Nightly    cefTRIAXone (ROCEPHIN) IV  2 g Intravenous Q24H    mirtazapine  15 mg Oral Nightly    sertraline  150 mg Oral Daily    sodium chloride flush  10 mL Intravenous 2 times per day    [Held by provider] enoxaparin  40 mg Subcutaneous Q24H    famotidine  20 mg Oral BID  insulin lispro  0-18 Units Subcutaneous TID     insulin lispro  0-9 Units Subcutaneous Nightly     PRN Meds: HYDROcodone 5 mg - acetaminophen **OR** HYDROcodone 5 mg - acetaminophen, sodium chloride flush, magnesium hydroxide, ondansetron, glucose, dextrose, glucagon (rDNA), dextrose, magnesium sulfate, potassium chloride **OR** potassium alternative oral replacement **OR** potassium chloride, acetaminophen    Ins and outs:      Intake/Output Summary (Last 24 hours) at 4/3/2019 2303  Last data filed at 4/3/2019 2237  Gross per 24 hour   Intake 4385.98 ml   Output 2650 ml   Net 1735.98 ml       Physical Examination     BP (!) 147/72   Pulse 94   Temp 97.6 °F (36.4 °C) (Oral)   Resp 17   Ht 5' 6\" (1.676 m)   Wt 222 lb (100.7 kg)   SpO2 96%   BMI 35.83 kg/m²     General appearance: No apparent distress. HEENT: Extraocular motion intact. Neck: Supple  Respiratory: Decreased breath sounds at bilateral lung bases. Cardiovascular: RRR with normal S1/S2 without murmurs, rubs or gallops. Abdomen: Soft, non-tender, non-distended with normal bowel sounds. Musculoskeletal: Right BKA. Mild erythema at stump. Increased edema and increased pain with palpation  Neurologic: Grossly non focal. CN: II-XII intact  Psychiatric: Alert and oriented  Vascular: Radial pulses palpable bilaterally. Skin:  No visible rashes or lesions. Labs     Recent Labs     04/01/19  1316 04/02/19  1626 04/03/19  0618   WBC 14.5* 14.0* 10.4   HGB 13.2* 12.7* 12.1*   HCT 38.6* 36.7* 35.2*    250 222     Recent Labs     04/01/19  1316 04/02/19  1626 04/03/19  0618   * 128* 133*   K 4.4 4.2 4.2   CL 95* 92* 99   CO2 25 23 24   BUN 12 11 10   CREATININE 0.8 0.7 0.7   CALCIUM 9.2 9.1 8.6     Recent Labs     04/01/19  1316 04/02/19  1626   AST 9 9   ALT 8* 6*   BILIDIR  --  <0.2   BILITOT 0.4 0.5   ALKPHOS 84 84     No results for input(s): INR in the last 72 hours.   No results for input(s): Felipe Risser in the last 72 hours. Urinalysis:      Lab Results   Component Value Date    NITRU NEGATIVE 02/15/2019    WBCUA 0-2 02/15/2019    BACTERIA NONE 02/15/2019    RBCUA 0-2 02/15/2019    BLOODU SMALL 02/15/2019    SPECGRAV >1.030 10/17/2018    GLUCOSEU >= 1000 02/15/2019       Diagnostic imaging/procedures       Ct Tibia Fibula Right W Contrast    Result Date: 4/1/2019  PROCEDURE: NONCONTRAST CT RIGHT LOWER EXTREMITY: CLINICAL INFORMATION: Prior below-the-knee interpretation. Recurrent cellulitis at the amputation site. TECHNIQUE: Multiple axial 3 mm images of the right lower extremity (with attention to the stomach) were obtained following the administration of intravenous contrast material. Computer generated sagittal and coronal images of the right lower extremity were also reconstructed. ALL CT SCANS AT THIS FACILITY use dose modulation, iterative reconstruction, and/or weight-based dosing when appropriate to reduce radiation dose to as low as reasonably achievable. FINDINGS: There is an open wound along the lateral aspect of the fibular head which was not \"on the prior study from February 15, suggesting possible wound dehiscence. There is persistent soft tissue swelling along the lateral aspect of the knee and extending into the stomach, as on the prior study, consistent with cellulitis. No definite drainable fluid collection is seen. No bone destruction is seen. The previous noted subcutaneous emphysema on the prior study has resolved. .     1. No evidence for osteomyelitis. Persistent findings of cellulitis involving the stump. No abscess or drainable fluid collection is seen. 2. Note that there is a deep wound along the lateral aspect of the fibular head, which was not present previously, suggesting possible wound dehiscence. Clinical correlation recommended. **This report has been created using voice recognition software. It may contain minor errors which are inherent in voice recognition technology. ** Final report electronically signed by Dr. Emilee Bashir on 4/1/2019 2:36 PM    Mri Tibula Fibula Right Wo Contrast    Result Date: 4/3/2019  PROCEDURE: MRI TIBIA FIBULA RIGHT WO CONTRAST CLINICAL INFORMATION: INFECTION, open wound, erythema and swelling on the BKA stump COMPARISON: No prior study. TECHNIQUE: Coronal T1 and STIR bilateral lower legs, axial T1, FST2 and STIR lower leg and sagittal T1 and STIR lower leg. FINDINGS: ALIGNMENT: Anatomic. MARROW/MARROW EDEMA/FRACTURE: Moderate marrow edema within the residual fibula suspicious for osteomyelitis. No definite tibial marrow edema. OSSEOUS DESTRUCTION/PERIOSTITIS: Small area of cortical destruction within the fibular stump. KNEE JOINTS: Moderate joint effusion at the knee. TENDONS: Unremarkable. MUSCULATURE: Marked muscular edema involving the anterolateral muscle group of the lower leg. There is severe such change in the lateral gastrocnemius. NEUROVASCULAR: Unremarkable. SOFT TISSUES: There is a large soft tissue ulcer laterally near the stump of the fibula. Marked soft tissue inflammation is seen throughout this region. Myositis and deep cellulitis are favored. There are a few areas of susceptibility artifact suspicious  for gas-forming infection. This extends down to the fibular stump. It abuts the tibial stump laterally without definite tibial marrow edema. OTHER: None. Moderate osteomyelitis within the residual fibula adjacent to an ulcer and marked adjacent cellulitis and myositis. **This report has been created using voice recognition software. It may contain minor errors which are inherent in voice recognition technology. ** Final report electronically signed by Dr. Yeimy Carr on 4/3/2019 5:07 PM      DVT prophylaxis: ? Lovenox                                 ? SCDs                                 ? SQ Heparin                                 ? Encourage ambulation           ? Already on Anticoagulation     Disposition:    ? Home       ? TCU       ? Rehab       ? Psych       ? SNF       ? [place]       ?  Other-

## 2019-04-03 NOTE — PROGRESS NOTES
Patient up in chair. Heart rate and rhythm regular. Radial pulse +2 strength equal bilaterally. R femoral pulse +3 strength. L posterior tibialis and dorsalis pedis pulses +2 strength. Lungs clear, regular rate and moderate depth. Skin turgor and capillary refill reactive <3 seconds. No further needs stated at this time. Call light within reach.  Katina Looney

## 2019-04-03 NOTE — PROGRESS NOTES
Pt admitted to  [phone] from ED and via cart/stretcher. Complaints: infection of right BKA site. IV normal saline infusing into the hand left, condition patent and no redness at a rate of 75 mls/ hour with about 100 mls in the bag still. IV site free of s/s of infection or infiltration. Vital signs obtained. Assessment and data collection initiated. Two nurse skin assessment performed by Kelli Valerio RN and Annie FIGUEREDO. Oriented to room. Policies and procedures for 6K explained. All questions answered with no further questions at this time. Fall prevention and safety brochure discussed with patient. Bed alarm on. Call light in reach. The best day to schedule a follow up Dr appointment is:  Tuesday p.m.

## 2019-04-03 NOTE — PROGRESS NOTES
Nutrition Assessment    Type and Reason for Visit: Initial, Positive Nutrition Screen(wound, weight loss)    Nutrition Recommendations: Weigh patient as able. Recommend multivitamin. Continue current diet. ONS initiated. Nutrition Assessment:   Pt. nutritionally compromised AEB stump wound infection. At risk for further nutrition compromise r/t increased nutrient needs for healing, history of diabetes. Patient report pleased with the weight loss he has had in the past 2 months and is interested in the weight management program provided by [facility]. [employer]; provided weight management solutions information flyer. Will provide unflavored Pascual BID with coffee and Glucerna TID (strawberry/ vanilla). Malnutrition Assessment:  · Malnutrition Status:  At risk for malnutrition    Nutrition Risk Level: High    Nutrient Needs:  · Estimated Daily Total Kcal: 0354-1341 kcals (30-33 kcal/kgm - ideal 61kgm)  · Estimated Daily Protein (g):  grams (1.5-2 grams protein/kgm - ideal 61kgm)    Nutrition Diagnosis:   · Problem: Increased nutrient needs  · Etiology: related to Increased demand for energy/nutrients     Signs and symptoms:  as evidenced by Presence of wounds    Objective Information:  · Nutrition-Focused Physical Findings: denies nausea or pain, denies difficulty chewing or swallowing; on humalog, remeron, vancomycin; glucose 270, hemoglobin A1C (4/2/19) 9.1%  · Wound Type: (right stump wound; 2/18/19 I & D stump)  · Current Nutrition Therapies:  · Oral Diet Orders: (carbohydrate control)   · Oral Diet intake: %(breakfast)  · Oral Nutrition Supplement (ONS) Orders: Diabetic Oral Supplement, Wound Healing Oral Supplement(glucerna TID and pascual BID)  · ONS intake: Unable to assess(to begin, reports had good acceptance on past admission)  · Anthropometric Measures:  · Ht: 5' 6\" (167.6 cm)   · Current Body Wt: 222 lb (100.7 kg)  · Admission Body Wt: 222 lb (100.7 kg)(stated 4/2/19; no edema)  · Usual Body Wt: (235# Feb 2019 per patient; per EHR: 3/14/19 217# 1.6oz, 2/21/19 222# 7.1oz, 9/21/18 219# 6.4oz)  · % Weight Change:  ,  await admission weight  · Ideal Body Wt: 134 lb (60.8 kg),   · BMI Classification: BMI > or equal to 40.0 Obese Class III(adjusted for BKA: 40.3 (based on stated weight))    Nutrition Interventions:   Continue current diet, Start ONS  Continued Inpatient Monitoring, Coordination of Care, Education Initiated(provided weight management solutions information flyer per patient request; encouraged intake at best effort, encouraged glucose control to aid in healing)    Nutrition Evaluation:   · Evaluation: Goals set   · Goals: Patient will consume 75% or more of meals to aid in healing during LOS.      · Monitoring: Meal Intake, Supplement Intake, Diet Tolerance, Skin Integrity, Wound Healing, Weight, Pertinent Labs, Monitor Bowel Function      Electronically signed by Emilia Rogers RD, LD on 4/3/19 at 2:27 PM    Contact Number: [phone]

## 2019-04-03 NOTE — PROGRESS NOTES
[facility]  INPATIENT PHYSICAL THERAPY  EVALUATION  [facility] ORTHOPEDICS 7K - 7K-18/018-A    Time In: 4041  Time Out: 8771  Timed Code Treatment Minutes: 8 Minutes  Minutes: 29          Date: 4/3/2019  Patient Name: Kaye Ayala,  Gender:  male        MRN: [id number]  : 1970  (44 y.o.)      Referring Practitioner: Leonidas Mosher DO  Diagnosis: deep postoperative wound infection  Additional Pertinent Hx: Per EMR: \"48 y.o. male who presented to [facility] with \"evaluation of a wound infection. The patient states that his RLE BKA was completed on . Patient admits to have experiencing multiple infections of his BKA since his initial surgery. He states that on 19 that an I&D was completed by Dr. Anh Quiroz (orthopedic surgery). Patient states that he noticed increased swelling of his RLE 2 days ago. He reports that home health nursing, who evaluates the patient daily, states that the patient wound was warm and presented with erythema causing him to be sent to the ED for evaluation. He rates his current pain as a 10/10 in severity and aching in character. He states that his wound is packed daily. Patient's denies any fever or chills. His infectious disease specialist is Dr. Kinza Castillo patient reports no additional symptoms or complaints at this time. \"-per er note and confirmed by myself\"     Past Medical History:   Diagnosis Date    Bipolar 2 disorder ([address]     previously followed with Dr. Quita Mejía and Magdiel Hamm in [facility] Diabetes mellitus type 2, uncontrolled ([address]     Diabetic foot ulcer ([address]     Diabetic polyneuropathy ([address]     Diabetic ulcer of right foot associated with type 2 diabetes mellitus ([address] 12/10/2015    Essential hypertension     \"never been on b/p medication that I know of\"    GERD (gastroesophageal reflux disease)     Hammer toe of left foot     Heart murmur     denies any chest pain or palpitations    History of tobacco abuse  Hx of BKA, right ([address]     Macular edema, diabetic, bilateral ([address] 05/04/2018    Dr. Alfred Alatorre referred to retina specialist for 2nd opinion    Marijuana abuse in remission     Onychomycosis     WD-Skin ulcer of fourth toe of right foot with necrosis of bone ([address] 6/29/2016     Past Surgical History:   Procedure Laterality Date    ABSCESS DRAINAGE Right     foot    FOOT DEBRIDEMENT Right 07/01/2016    I & D    INCISION AND DRAINAGE Right 2/18/2019    I&D RIGHT STUMP performed by Dalia Ojeda MD at [address] [facility] [address] Right 07/20/2016    OTHER SURGICAL HISTORY Right 1/14/15    sole of foot I&D    [place] DRAIN INFECT SHOULDER BURSA Left 8/18/2017    LEFT SHOULDER INCISION AND DRAINAGE performed by Dalia Ojeda MD at [address] [facility] [address]PT [address] Right 9/20/2018    EXCISIONAL DEBRIDEMENT RIGHT BKA STUMP performed by No Anderson MD at [address] Right 1/16/15    2nd toe with wound vac applied    WISDOM TOOTH EXTRACTION  ? when       Restrictions/Precautions:  General Precautions(R BKA)       Subjective:  Chart Reviewed: Yes  Patient assessed for rehabilitation services?: Yes  Family / Caregiver Present: No  Subjective: Pt received in W/C and was agreeable to PT interventions. Post session, pt in room in W/C. General:  Overall Orientation Status: Within Functional Limits  Follows Commands: Within Functional Limits    Vision: Within Functional Limits    Hearing: Within functional limits         Pain:  Denies. Social/Functional History:    Lives With: (roommate)  Type of Home: House(restoration home)  Home Layout: One level  Home Access: Ramped entrance  Home Equipment: Rolling walker, Wheelchair-manual(Pt needs new WC, current WC was donated from a friend and is not in good shape.  Pt will benefit from [facility] fitting in an outpatient setting.)     Bathroom Shower/Tub: Tub/Shower unit  Bathroom Toilet: Standard  Bathroom Equipment: Shower Assessment: Body structures, Functions, Activity limitations: Decreased balance, Decreased endurance  Assessment: Pt would benefit from skilled PT services during admission and post hospital d/c for improvements in functional mobility. Pt would benefit from [facility] evaluation in outpatient setting. Prognosis: Good    Clinical Presentation: Moderate - Evolving with Changing Characteristics:   Pt would benefit from skilled PT services during admission and post hospital d/c for improvements in functional mobility. Pt would benefit from [facility] evaluation in outpatient setting. Decision Making: Moderate Complexitybased on patient assessment and decision making process of determining plan of care and establishing reasonable expectations for measurable functional outcomes    REQUIRES PT FOLLOW UP: Yes    Discharge Recommendations:  Discharge Recommendations: Continue to assess pending progress, Patient would benefit from continued therapy after discharge, Home with assist PRN    Patient Education:  Patient Education: role of PT, plan of care, stair/curb negotiation    Equipment Recommendations:  Equipment Needed: Yes  Mobility Devices: Wheelchair  Wheelchair: Light Weight(WC cushion/pressure relief, leg rests)    Safety:  Type of devices:  All fall risk precautions in place, Gait belt, Left in chair, Nurse notified    Plan:  Times per week: 1-3xGM  Specific instructions for Next Treatment: advance as tolerated   Current Treatment Recommendations: Transfer Training, Strengthening, Endurance Training, ROM, Balance Training, Functional Mobility Training, Stair training, IADL Training, Gait Training, Home Exercise Program    Goals:  Patient goals : return home  Short term goals  Time Frame for Short term goals: 2 weeks  Short term goal 1: pt to ambulate 50ft with use of LRAD and supervision A   Short term goal 2: pt to negotiate curb step with stand-by assist and 2WW for safe mobility in the community   Long term goals  Time Frame for Long term goals : N/A secondary to short ELOS    Evaluation Complexity: Based on the findings of patient history, examination, clinical presentation, and decision making during this evaluation, the evaluation of Tanmay Cowart  is of medium complexity.             AM-PAC Inpatient Mobility without Stair Climbing Raw Score : 20  AM-PAC Inpatient without Stair Climbing T-Scale Score : 60.57  Mobility Inpatient CMS 0-100% Score: 0  Mobility Inpatient without Stair CMS G-Code Modifier : [facility] [place]

## 2019-04-03 NOTE — PROGRESS NOTES
Patient in bed, awake, alert and oriented x4. Speech clear and appropriate. Mucous membranes pink and moist. Heart rate and rhythm regular. Radial pulse +2 strength equal bilaterally. R femoral pulse +3 strength. L posterior tibialis and dorsalis pedis pulses +2 strength. Lungs clear, regular rate and moderate depth. Abdomen round, firm non-tender. Bowel sounds active x4. PERRLA with variance of 4mm-3mm. Hand grasp strong and equal bilaterally. L pedal push and pull strong. Skin turgor and capillary refill reactive <3 seconds. No further needs requested at this time. Call light within reach, bed in low position.  Alyssia Rosales

## 2019-04-04 ENCOUNTER — ANESTHESIA (OUTPATIENT)
Dept: OPERATING ROOM | Age: 49
DRG: 493 | End: 2019-04-04
Payer: MEDICARE

## 2019-04-04 ENCOUNTER — ANESTHESIA EVENT (OUTPATIENT)
Dept: OPERATING ROOM | Age: 49
DRG: 493 | End: 2019-04-04
Payer: MEDICARE

## 2019-04-04 VITALS
DIASTOLIC BLOOD PRESSURE: 53 MMHG | RESPIRATION RATE: 1 BRPM | OXYGEN SATURATION: 100 % | SYSTOLIC BLOOD PRESSURE: 94 MMHG

## 2019-04-04 LAB
ANION GAP SERPL CALCULATED.3IONS-SCNC: 13 MEQ/L (ref 8–16)
BUN BLDV-MCNC: 11 MG/DL (ref 7–22)
CALCIUM SERPL-MCNC: 8.7 MG/DL (ref 8.5–10.5)
CHLORIDE BLD-SCNC: 98 MEQ/L (ref 98–111)
CO2: 24 MEQ/L (ref 23–33)
CREAT SERPL-MCNC: 0.7 MG/DL (ref 0.4–1.2)
ERYTHROCYTE [DISTWIDTH] IN BLOOD BY AUTOMATED COUNT: 15 % (ref 11.5–14.5)
ERYTHROCYTE [DISTWIDTH] IN BLOOD BY AUTOMATED COUNT: 45.8 FL (ref 35–45)
GFR SERPL CREATININE-BSD FRML MDRD: > 90 ML/MIN/1.73M2
GLUCOSE BLD-MCNC: 254 MG/DL (ref 70–108)
GLUCOSE BLD-MCNC: 309 MG/DL (ref 70–108)
GLUCOSE BLD-MCNC: 313 MG/DL (ref 70–108)
GLUCOSE BLD-MCNC: 314 MG/DL (ref 70–108)
GLUCOSE BLD-MCNC: 325 MG/DL (ref 70–108)
GLUCOSE BLD-MCNC: 365 MG/DL (ref 70–108)
HCT VFR BLD CALC: 35.7 % (ref 42–52)
HEMOGLOBIN: 12.1 GM/DL (ref 14–18)
MCH RBC QN AUTO: 28.7 PG (ref 26–33)
MCHC RBC AUTO-ENTMCNC: 33.9 GM/DL (ref 32.2–35.5)
MCV RBC AUTO: 84.8 FL (ref 80–94)
PLATELET # BLD: 248 THOU/MM3 (ref 130–400)
PMV BLD AUTO: 9.4 FL (ref 9.4–12.4)
POTASSIUM SERPL-SCNC: 4 MEQ/L (ref 3.5–5.2)
RBC # BLD: 4.21 MILL/MM3 (ref 4.7–6.1)
SODIUM BLD-SCNC: 135 MEQ/L (ref 135–145)
WBC # BLD: 9 THOU/MM3 (ref 4.8–10.8)

## 2019-04-04 PROCEDURE — 3700000000 HC ANESTHESIA ATTENDED CARE: Performed by: ORTHOPAEDIC SURGERY

## 2019-04-04 PROCEDURE — 85027 COMPLETE CBC AUTOMATED: CPT

## 2019-04-04 PROCEDURE — 6370000000 HC RX 637 (ALT 250 FOR IP): Performed by: NURSE PRACTITIONER

## 2019-04-04 PROCEDURE — 6360000002 HC RX W HCPCS: Performed by: ANESTHESIOLOGY

## 2019-04-04 PROCEDURE — 3600000003 HC SURGERY LEVEL 3 BASE: Performed by: ORTHOPAEDIC SURGERY

## 2019-04-04 PROCEDURE — 2580000003 HC RX 258: Performed by: ANESTHESIOLOGY

## 2019-04-04 PROCEDURE — 2709999900 HC NON-CHARGEABLE SUPPLY: Performed by: ORTHOPAEDIC SURGERY

## 2019-04-04 PROCEDURE — 0QBJ0ZZ EXCISION OF RIGHT FIBULA, OPEN APPROACH: ICD-10-PCS | Performed by: ORTHOPAEDIC SURGERY

## 2019-04-04 PROCEDURE — 7100000001 HC PACU RECOVERY - ADDTL 15 MIN: Performed by: ORTHOPAEDIC SURGERY

## 2019-04-04 PROCEDURE — 87075 CULTR BACTERIA EXCEPT BLOOD: CPT

## 2019-04-04 PROCEDURE — 36415 COLL VENOUS BLD VENIPUNCTURE: CPT

## 2019-04-04 PROCEDURE — 6360000002 HC RX W HCPCS: Performed by: NURSE PRACTITIONER

## 2019-04-04 PROCEDURE — 80048 BASIC METABOLIC PNL TOTAL CA: CPT

## 2019-04-04 PROCEDURE — 87205 SMEAR GRAM STAIN: CPT

## 2019-04-04 PROCEDURE — 6370000000 HC RX 637 (ALT 250 FOR IP): Performed by: INTERNAL MEDICINE

## 2019-04-04 PROCEDURE — 99232 SBSQ HOSP IP/OBS MODERATE 35: CPT | Performed by: INTERNAL MEDICINE

## 2019-04-04 PROCEDURE — 6370000000 HC RX 637 (ALT 250 FOR IP): Performed by: ANESTHESIOLOGY

## 2019-04-04 PROCEDURE — 3700000001 HC ADD 15 MINUTES (ANESTHESIA): Performed by: ORTHOPAEDIC SURGERY

## 2019-04-04 PROCEDURE — 0QBG0ZZ EXCISION OF RIGHT TIBIA, OPEN APPROACH: ICD-10-PCS | Performed by: ORTHOPAEDIC SURGERY

## 2019-04-04 PROCEDURE — 2580000003 HC RX 258: Performed by: NURSE PRACTITIONER

## 2019-04-04 PROCEDURE — 87070 CULTURE OTHR SPECIMN AEROBIC: CPT

## 2019-04-04 PROCEDURE — 87186 SC STD MICRODIL/AGAR DIL: CPT

## 2019-04-04 PROCEDURE — 1200000000 HC SEMI PRIVATE

## 2019-04-04 PROCEDURE — 7100000000 HC PACU RECOVERY - FIRST 15 MIN: Performed by: ORTHOPAEDIC SURGERY

## 2019-04-04 PROCEDURE — 87077 CULTURE AEROBIC IDENTIFY: CPT

## 2019-04-04 PROCEDURE — 3600000013 HC SURGERY LEVEL 3 ADDTL 15MIN: Performed by: ORTHOPAEDIC SURGERY

## 2019-04-04 PROCEDURE — 82948 REAGENT STRIP/BLOOD GLUCOSE: CPT

## 2019-04-04 RX ORDER — PROPOFOL 10 MG/ML
INJECTION, EMULSION INTRAVENOUS PRN
Status: DISCONTINUED | OUTPATIENT
Start: 2019-04-04 | End: 2019-04-04 | Stop reason: SDUPTHER

## 2019-04-04 RX ORDER — MORPHINE SULFATE 4 MG/ML
4 INJECTION, SOLUTION INTRAMUSCULAR; INTRAVENOUS
Status: DISCONTINUED | OUTPATIENT
Start: 2019-04-04 | End: 2019-04-10 | Stop reason: HOSPADM

## 2019-04-04 RX ORDER — MEPERIDINE HYDROCHLORIDE 25 MG/ML
12.5 INJECTION INTRAMUSCULAR; INTRAVENOUS; SUBCUTANEOUS EVERY 5 MIN PRN
Status: DISCONTINUED | OUTPATIENT
Start: 2019-04-04 | End: 2019-04-04 | Stop reason: HOSPADM

## 2019-04-04 RX ORDER — INSULIN GLARGINE 100 [IU]/ML
30 INJECTION, SOLUTION SUBCUTANEOUS 2 TIMES DAILY
Status: DISCONTINUED | OUTPATIENT
Start: 2019-04-04 | End: 2019-04-04

## 2019-04-04 RX ORDER — SODIUM CHLORIDE 9 MG/ML
INJECTION, SOLUTION INTRAVENOUS CONTINUOUS PRN
Status: DISCONTINUED | OUTPATIENT
Start: 2019-04-04 | End: 2019-04-04 | Stop reason: SDUPTHER

## 2019-04-04 RX ORDER — LABETALOL HYDROCHLORIDE 5 MG/ML
5 INJECTION, SOLUTION INTRAVENOUS EVERY 10 MIN PRN
Status: DISCONTINUED | OUTPATIENT
Start: 2019-04-04 | End: 2019-04-04 | Stop reason: HOSPADM

## 2019-04-04 RX ORDER — INSULIN GLARGINE 100 [IU]/ML
50 INJECTION, SOLUTION SUBCUTANEOUS 2 TIMES DAILY
Status: DISCONTINUED | OUTPATIENT
Start: 2019-04-04 | End: 2019-04-06

## 2019-04-04 RX ORDER — HYDROCODONE BITARTRATE AND ACETAMINOPHEN 5; 325 MG/1; MG/1
1-2 TABLET ORAL
Qty: 30 TABLET | Refills: 0 | Status: SHIPPED | OUTPATIENT
Start: 2019-04-04 | End: 2019-04-11

## 2019-04-04 RX ORDER — FENTANYL CITRATE 50 UG/ML
50 INJECTION, SOLUTION INTRAMUSCULAR; INTRAVENOUS EVERY 5 MIN PRN
Status: DISCONTINUED | OUTPATIENT
Start: 2019-04-04 | End: 2019-04-04 | Stop reason: HOSPADM

## 2019-04-04 RX ORDER — MORPHINE SULFATE 2 MG/ML
2 INJECTION, SOLUTION INTRAMUSCULAR; INTRAVENOUS
Status: DISCONTINUED | OUTPATIENT
Start: 2019-04-04 | End: 2019-04-10 | Stop reason: HOSPADM

## 2019-04-04 RX ORDER — CYCLOBENZAPRINE HCL 10 MG
10 TABLET ORAL 3 TIMES DAILY PRN
Status: DISCONTINUED | OUTPATIENT
Start: 2019-04-04 | End: 2019-04-10 | Stop reason: HOSPADM

## 2019-04-04 RX ORDER — FENTANYL CITRATE 50 UG/ML
INJECTION, SOLUTION INTRAMUSCULAR; INTRAVENOUS PRN
Status: DISCONTINUED | OUTPATIENT
Start: 2019-04-04 | End: 2019-04-04 | Stop reason: SDUPTHER

## 2019-04-04 RX ORDER — ONDANSETRON 2 MG/ML
4 INJECTION INTRAMUSCULAR; INTRAVENOUS
Status: DISCONTINUED | OUTPATIENT
Start: 2019-04-04 | End: 2019-04-04 | Stop reason: HOSPADM

## 2019-04-04 RX ADMIN — MORPHINE SULFATE 4 MG: 4 INJECTION INTRAVENOUS at 20:14

## 2019-04-04 RX ADMIN — FENTANYL CITRATE 100 MCG: 50 INJECTION INTRAMUSCULAR; INTRAVENOUS at 10:02

## 2019-04-04 RX ADMIN — HYDROCODONE BITARTRATE AND ACETAMINOPHEN 2 TABLET: 5; 325 TABLET ORAL at 08:33

## 2019-04-04 RX ADMIN — HYDROCODONE BITARTRATE AND ACETAMINOPHEN 2 TABLET: 5; 325 TABLET ORAL at 19:14

## 2019-04-04 RX ADMIN — Medication 10 ML: at 20:14

## 2019-04-04 RX ADMIN — CEFTRIAXONE SODIUM 2 G: 2 INJECTION, POWDER, FOR SOLUTION INTRAMUSCULAR; INTRAVENOUS at 20:13

## 2019-04-04 RX ADMIN — FAMOTIDINE 20 MG: 20 TABLET ORAL at 20:14

## 2019-04-04 RX ADMIN — INSULIN LISPRO 5 UNITS: 100 INJECTION, SOLUTION INTRAVENOUS; SUBCUTANEOUS at 21:30

## 2019-04-04 RX ADMIN — SODIUM CHLORIDE: 9 INJECTION, SOLUTION INTRAVENOUS at 09:54

## 2019-04-04 RX ADMIN — PROPOFOL 170 MG: 10 INJECTION, EMULSION INTRAVENOUS at 10:02

## 2019-04-04 RX ADMIN — INSULIN GLARGINE 30 UNITS: 100 INJECTION, SOLUTION SUBCUTANEOUS at 11:50

## 2019-04-04 RX ADMIN — INSULIN LISPRO 15 UNITS: 100 INJECTION, SOLUTION INTRAVENOUS; SUBCUTANEOUS at 16:59

## 2019-04-04 RX ADMIN — INSULIN GLARGINE 50 UNITS: 100 INJECTION, SOLUTION SUBCUTANEOUS at 21:30

## 2019-04-04 RX ADMIN — FAMOTIDINE 20 MG: 20 TABLET ORAL at 11:50

## 2019-04-04 RX ADMIN — INSULIN HUMAN 8 UNITS: 100 INJECTION, SOLUTION PARENTERAL at 11:05

## 2019-04-04 RX ADMIN — MORPHINE SULFATE 4 MG: 4 INJECTION INTRAVENOUS at 11:46

## 2019-04-04 RX ADMIN — MIRTAZAPINE 15 MG: 15 TABLET, FILM COATED ORAL at 20:13

## 2019-04-04 RX ADMIN — SERTRALINE 150 MG: 100 TABLET, FILM COATED ORAL at 11:50

## 2019-04-04 RX ADMIN — CYCLOBENZAPRINE HYDROCHLORIDE 10 MG: 10 TABLET, FILM COATED ORAL at 19:54

## 2019-04-04 RX ADMIN — FENTANYL CITRATE 50 MCG: 50 INJECTION, SOLUTION INTRAMUSCULAR; INTRAVENOUS at 10:35

## 2019-04-04 RX ADMIN — HYDROMORPHONE HYDROCHLORIDE 1 MG: 1 INJECTION, SOLUTION INTRAMUSCULAR; INTRAVENOUS; SUBCUTANEOUS at 10:35

## 2019-04-04 RX ADMIN — HYDROCODONE BITARTRATE AND ACETAMINOPHEN 2 TABLET: 5; 325 TABLET ORAL at 14:26

## 2019-04-04 ASSESSMENT — PULMONARY FUNCTION TESTS
PIF_VALUE: 13
PIF_VALUE: 13
PIF_VALUE: 1
PIF_VALUE: 1
PIF_VALUE: 15
PIF_VALUE: 12
PIF_VALUE: 2
PIF_VALUE: 0
PIF_VALUE: 2
PIF_VALUE: 12
PIF_VALUE: 12
PIF_VALUE: 13
PIF_VALUE: 1
PIF_VALUE: 1
PIF_VALUE: 12
PIF_VALUE: 4
PIF_VALUE: 1
PIF_VALUE: 13
PIF_VALUE: 13
PIF_VALUE: 12
PIF_VALUE: 12
PIF_VALUE: 13
PIF_VALUE: 22
PIF_VALUE: 13
PIF_VALUE: 0
PIF_VALUE: 3
PIF_VALUE: 14
PIF_VALUE: 13
PIF_VALUE: 13
PIF_VALUE: 0
PIF_VALUE: 12
PIF_VALUE: 19
PIF_VALUE: 14
PIF_VALUE: 2
PIF_VALUE: 13

## 2019-04-04 ASSESSMENT — PAIN SCALES - GENERAL
PAINLEVEL_OUTOF10: 10
PAINLEVEL_OUTOF10: 7
PAINLEVEL_OUTOF10: 10
PAINLEVEL_OUTOF10: 5
PAINLEVEL_OUTOF10: 10
PAINLEVEL_OUTOF10: 6
PAINLEVEL_OUTOF10: 10
PAINLEVEL_OUTOF10: 8
PAINLEVEL_OUTOF10: 10

## 2019-04-04 ASSESSMENT — PAIN DESCRIPTION - LOCATION
LOCATION: LEG

## 2019-04-04 ASSESSMENT — PAIN DESCRIPTION - FREQUENCY
FREQUENCY: CONTINUOUS

## 2019-04-04 ASSESSMENT — PAIN DESCRIPTION - PROGRESSION
CLINICAL_PROGRESSION: GRADUALLY IMPROVING
CLINICAL_PROGRESSION: NOT CHANGED

## 2019-04-04 ASSESSMENT — PAIN DESCRIPTION - PAIN TYPE
TYPE: SURGICAL PAIN
TYPE: ACUTE PAIN

## 2019-04-04 ASSESSMENT — PAIN DESCRIPTION - DESCRIPTORS
DESCRIPTORS: SHARP
DESCRIPTORS: SHARP
DESCRIPTORS: THROBBING;SHOOTING
DESCRIPTORS: SHOOTING

## 2019-04-04 ASSESSMENT — PAIN DESCRIPTION - ONSET
ONSET: ON-GOING

## 2019-04-04 ASSESSMENT — PAIN - FUNCTIONAL ASSESSMENT: PAIN_FUNCTIONAL_ASSESSMENT: ACTIVITIES ARE NOT PREVENTED

## 2019-04-04 ASSESSMENT — PAIN DESCRIPTION - ORIENTATION
ORIENTATION: RIGHT
ORIENTATION: LOWER

## 2019-04-04 NOTE — PROGRESS NOTES
Patient is sitting in chair, alert and oriented X4. Heart rhythm is regular. Lung sounds are clear, non-labored, chest rise symmetrical. Abdomen is soft, round, non-tender. Bowel sounds present in all four quadrants. Left leg is pink, dry, and warm. Pedal push and pull is strong. Unable to access right side push and pull. Right stump is pink, dry, warm. Patient rates pain at 6/10, offered repositioning. Patient denies any other needs. Call light and bed side table within reach. Vadim Wang SN/RSC.

## 2019-04-04 NOTE — PROGRESS NOTES
Patient voiced concerned about the swelling and redness of his stump. He stated he has had numerous infections in this leg and they have never been this swollen or red. He was encouraged to keep stump elevated. Stump is warm and swollen, but still soft.  Ortho to see in AM

## 2019-04-04 NOTE — BRIEF OP NOTE
Brief Postoperative Note  ______________________________________________________________    Patient: Arthur Gtz  YOB: 1970  MRN: [id number]  Date of Procedure: 4/4/2019    Pre-Op Diagnosis: infection    Post-Op Diagnosis: Same       Procedure(s):  I&D AND REVISION OF AMPUTATION RIGHT LEG    Anesthesia: General    Surgeon(s):  Vivian Heath MD    Staff:  Scrub Person First: Denver Isaac; Giovany Martell  Scrub Person Second: Nesha Bean  Physician Assistant: Philip Morgan PA-C; CINDY Larson     Estimated Blood Loss: * No values recorded between 4/4/2019  9:53 AM and 4/4/2019 95:38 AM *    Complications: None    Specimens:   ID Type Source Tests Collected by Time Destination   1 : right knee tissue  Tissue Joint, Knee ANAEROBIC AND AEROBIC CULTURE Vivian Heath MD 4/4/2019 1020        Implants:  * No implants in log *      Drains: * No LDAs found *    Findings: confirmed     MARILYN Emanuel - CNP  Date: 4/4/2019  Time: 10:35 AM

## 2019-04-04 NOTE — ANESTHESIA PRE PROCEDURE
Department of Anesthesiology  Preprocedure Note       Name:  Kaye Ayala   Age:  44 y.o.  :  1970                                          MRN:  [id number]         Date:  2019      Surgeon: Man Wakefield):  Tessy Tate MD    Procedure: I&D AND REVISION OF AMPUTATION RIGHT LEG (Right )    Medications prior to admission:   Prior to Admission medications    Medication Sig Start Date End Date Taking? Authorizing Provider   insulin lispro (HUMALOG) 100 UNIT/ML injection vial Inject 18 Units into the skin 3 times daily (before meals)   Yes Historical Provider, MD   mirtazapine (REMERON) 15 MG tablet Take 15 mg by mouth nightly as needed   Yes Historical Provider, MD   insulin glargine (BASAGLAR KWIKPEN) 100 UNIT/ML injection pen Inject 55 Units into the skin 2 times daily   Yes Historical Provider, MD   acetaminophen (TYLENOL) 500 MG tablet Take 1,000 mg by mouth every 6 hours as needed for Pain   Yes Historical Provider, MD   sertraline (ZOLOFT) 100 MG tablet Take 1.5 tablets by mouth daily 18  Yes MARILYN Valverde CNP   amoxicillin-clavulanate (AUGMENTIN) 875-125 MG per tablet Take 1 tablet by mouth 2 times daily for 10 days 19  Fay Bryan PA-C   oxyCODONE (OXY-IR) 10 MG immediate release tablet Take 0.5 tablets by mouth every 6 hours as needed for Pain for up to 3 doses.  19  Fay Bryan PA-C   Continuous Blood Gluc  (FREESTYLE MARIANELA READER) LIANNE 1 Units by Does not apply route continuous 18   MARILYN Sutherland CNP   blood glucose monitor strips Accucheck Sheila Test Strips Test blood sugars 4 times daily DX:E11.65 10/23/18   Uma GramMARILYN CNP   Lancets MISC Use to test blood sugars 4 times daily DX:E11.65 18   Javier De Leon PA-C   Insulin Syringe-Needle U-100 29G X 1/2\" 0.3 ML MISC 1 each by Does not apply route 4 times daily (after meals and at bedtime) 18   Javier De Leon PA-C   Insulin Pen Needle 32G X 4 MM MISC dextrose 5 % 100 mL IVPB  1 g Intravenous PRN Marie Royal, DO        potassium chloride (KLOR-CON M) extended release tablet 40 mEq  40 mEq Oral PRN Marie Royal, DO        Or    potassium bicarb-citric acid (EFFER-K) effervescent tablet 40 mEq  40 mEq Oral PRN Marie Royal, DO        Or    potassium chloride 10 mEq/100 mL IVPB (Peripheral Line)  10 mEq Intravenous PRN Marie Royal, DO        famotidine (PEPCID) tablet 20 mg  20 mg Oral BID Marie Royal, DO   20 mg at 04/03/19 2101    acetaminophen (TYLENOL) tablet 650 mg  650 mg Oral Q4H PRN Marie Royal, DO        insulin lispro (HUMALOG) injection vial 0-18 Units  0-18 Units Subcutaneous TID WC Marie Washington, DO   12 Units at 04/03/19 1924    insulin lispro (HUMALOG) injection vial 0-9 Units  0-9 Units Subcutaneous Nightly Marie Washington, DO   6 Units at 04/03/19 2133       Allergies: Allergies   Allergen Reactions    Pcn [Penicillins] Shortness Of Breath, Nausea And Vomiting and Other (See Comments)     Does not remember reactions. Has TOLERATED amoxicillin and several different cephalosporins.  Clindamycin/Lincomycin Itching       Problem List:    Patient Active Problem List   Diagnosis Code    Status post below knee amputation of right lower extremity ([address] Z89.511    Gait disturbance R26.9    Sebaceous cyst L72.3    Uncontrolled type 2 diabetes mellitus with hyperglycemia, with long-term current use of insulin ([facility]) E11.65, Z79.4    Severe bipolar II disorder, recent episode major depressive, remission ([facility]) F31.81    Bipolar 1 disorder ([facility]) F31.9    Leukocytosis D72.829    Normocytic anemia D64.9    Obesity (BMI 30-39. 9) E66.9    Infection of below knee amputation stump ([facility]) T87.40    History of noncompliance with medical treatment Z91.19    Essential hypertension I10    Diabetic ulcer of right lower leg ([facility]) E11.622, L97.919    Hx of right BKA ([facility]) Z89.511    Tobacco dependence F17.200    History of osteomyelitis Z87.39    Gastroesophageal reflux disease without esophagitis K21.9    History of marijuana use Z87.898    Abscess of right leg L02.415    BKA stump complication ([facility]) C77.6    Physical debility R53.81    Diabetic ulcer of left fifth toe ([facility]) E11.621, L97.529    Cellulitis of fifth toe, left L03.032    Right BKA infection ([facility]) T87.43    Cellulitis of right lower extremity L03.115    Anemia D64.9    Electrolyte imbalance E87.8    DM type 2, uncontrolled, with lower extremity ulcer ([facility]) E11.622, E11.65, L97.909    Weakness R53.1    Deep postoperative wound infection T81.42XA    Infective myositis of right lower leg M60.061    Pyogenic inflammation of bone ([facility]) M86.9       Past Medical History:        Diagnosis Date    Bipolar 2 disorder ([facility])     previously followed with Dr. Gisella Alvarez and Eli Storm in [facility] Diabetes mellitus type 2, uncontrolled ([address]     Diabetic foot ulcer ([address]     Diabetic polyneuropathy ([address]     Diabetic ulcer of right foot associated with type 2 diabetes mellitus ([address] 12/10/2015    Essential hypertension     \"never been on b/p medication that I know of\"    GERD (gastroesophageal reflux disease)     Hammer toe of left foot     Heart murmur     denies any chest pain or palpitations    History of tobacco abuse     Hx of BKA, right ([facility])     Macular edema, diabetic, bilateral ([address] 05/04/2018    Dr. Migdalia High referred to retina specialist for 2nd opinion    Marijuana abuse in remission     Onychomycosis     WD-Skin ulcer of fourth toe of right foot with necrosis of bone ([address] 6/29/2016       Past Surgical History:        Procedure Laterality Date    ABSCESS DRAINAGE Right     foot    FOOT DEBRIDEMENT Right 07/01/2016    I & D    INCISION AND DRAINAGE Right 2/18/2019    I&D RIGHT STUMP performed by Di Monroy MD at [address] Right 07/20/2016    OTHER SURGICAL HISTORY Right 1/14/15    sole of foot I&D    [place] DRAIN INFECT SHOULDER BURSA Left 2017    LEFT SHOULDER INCISION AND DRAINAGE performed by Marisela Goldman MD at [address] [place] [address] OFFICE/OUTPT [address] Right 2018    EXCISIONAL DEBRIDEMENT RIGHT BKA STUMP performed by Vicky Soares MD at [address] Right 1/16/15    2nd toe with wound vac applied    WISDOM TOOTH EXTRACTION  ? when       Social History:    Social History     Tobacco Use    Smoking status: Former Smoker     Packs/day: 5.00     Years: 13.00     Pack years: 65.00     Types: Cigars     Last attempt to quit:      Years since quittin.2    Smokeless tobacco: Never Used   Substance Use Topics    Alcohol use: Not Currently     Alcohol/week: 0.0 oz                                Counseling given: Not Answered      Vital Signs (Current):   Vitals:    19 2038 19 2350 19 0346 19 0745   BP: (!) 147/72 133/69 (!) 120/59 136/73   Pulse: 94 89 86 90   Resp: 17 15 14 14   Temp: 97.6 °F (36.4 °C) 96.7 °F (35.9 °C) 97.5 °F (36.4 °C) 96.2 °F (35.7 °C)   TempSrc: Oral Oral Oral Oral   SpO2: 96% 93% 93% 95%   Weight:       Height:                                                  BP Readings from Last 3 Encounters:   19 136/73   19 (!) 109/53   19 138/69       NPO Status:                                                                                 BMI:   Wt Readings from Last 3 Encounters:   19 222 lb (100.7 kg)   19 222 lb (100.7 kg)   19 222 lb 1.6 oz (100.7 kg)     Body mass index is 35.83 kg/m².     CBC:   Lab Results   Component Value Date    WBC 9.0 2019    RBC 4.21 2019    HGB 12.1 2019    HCT 35.7 2019    MCV 84.8 2019    RDW 14.4 2018     2019       CMP:   Lab Results   Component Value Date     2019    K 4.0 2019    K 4.2 2019    CL 98 2019    CO2 24 2019    BUN 11 2019    CREATININE 0.7 2019 GFRAA >60 08/24/2016    LABGLOM >90 04/04/2019    GLUCOSE 314 04/04/2019    PROT 7.8 04/02/2019    CALCIUM 8.7 04/04/2019    BILITOT 0.5 04/02/2019    ALKPHOS 84 04/02/2019    AST 9 04/02/2019    ALT 6 04/02/2019       POC Tests:   Recent Labs     04/04/19  0853   POCGLU 313*       Coags:   Lab Results   Component Value Date    PROTIME 12.2 01/03/2019    INR 1.06 01/03/2019    APTT 28.6 07/19/2016       HCG (If Applicable): No results found for: PREGTESTUR, PREGSERUM, HCG, HCGQUANT     ABGs: No results found for: PHART, PO2ART, FBM9OTH, EUE8HTG, BEART, W2OPJWWZ     Type & Screen (If Applicable):  No results found for: LABABO, [address]    Anesthesia Evaluation  Patient summary reviewed and Nursing notes reviewed no history of anesthetic complications:   Airway: Mallampati: III  TM distance: >3 FB   Neck ROM: full  Mouth opening: > = 3 FB Dental:    (+) upper dentures and lower dentures      Pulmonary:Negative Pulmonary ROS and normal exam  breath sounds clear to auscultation                             Cardiovascular:  Exercise tolerance: good (>4 METS),   (+) hypertension:,       ECG reviewed                        Neuro/Psych:   (+) neuromuscular disease:, psychiatric history:            GI/Hepatic/Renal:   (+) GERD: well controlled,          ROS comment: obesity. Endo/Other:    (+) DiabetesType II DM, poorly controlled, using insulin, . Pt had no PAT visit       Abdominal:   (+) obese,     Abdomen: soft. Vascular: negative vascular ROS. Anesthesia Plan      general     ASA 3       Induction: intravenous. MIPS: Postoperative opioids intended and Prophylactic antiemetics administered. Anesthetic plan and risks discussed with patient. Plan discussed with CRNA.                   López Elias DO   4/4/2019

## 2019-04-04 NOTE — PLAN OF CARE
unattainable due to new needs post op. Problem: Pain:  Goal: Control of acute pain  Description  Control of acute pain  Note:   Patient has PRN pain medication for his acute symptoms. Problem: Skin Integrity - Impaired:  Intervention: Heel elevation  Note:   Patient has an open wound on his right stump. He is encouraged to keep it elevated at this time. Care plan reviewed with patient. Patient verbalize understanding of the plan of care and contribute to goal setting.

## 2019-04-04 NOTE — CARE COORDINATION
4/4/19, 2:59 PM      Sumaya Shields day: 2  Location: [facility]18/018 Reason for admit: Deep postoperative wound infection [T81.42XA]     Procedure: 04/04/19  I&D AND REVISION OF AMPUTATION RIGHT LEG by Dr. Andra Lara of Care: wound dressing care,pain management,    plan is for wound VAC placed tomorrow, ID following. PCP: No primary care provider on file. requested Alice Mom    Readmission Risk Score: 42%    Discharge Plan: spoke with Nichole Sheppard; he had I&D today, patient want to go home with [facility] IV infusion. 1500 pm Guy Soriano CM called [facility] for eval as suggested by Dr. Martha Jarrett.

## 2019-04-04 NOTE — OP NOTE
[address] [id number]                                OPERATIVE REPORT    PATIENT NAME: Reynolds Councilman                     :        1970  MED REC NO:   [id number]                           ROOM:       0018  ACCOUNT NO:   [de-identified]                           ADMIT DATE: 2019  PROVIDER:     Azael Frey. Angelica Turner M.D.    Cyndy Estebanon:  2019    PREOPERATIVE DIAGNOSIS:  Osteomyelitis, right fibula. POSTOPERATIVE DIAGNOSIS:  Osteomyelitis, right fibula. SURGEON:  Tory Page MD    ASSISTANT:  Amber Montelongo PA-C    SECOND ASSISTANT:  Timi Liao. CTAHI Horta    ANESTHESIA:  General.    COMPLICATIONS:  None. PROCEDURE:  Saucerization, right tibia and fibula. INDICATIONS:  The patient is a 24-year-old poorly controlled diabetic,  had below-the-knee amputation, has some wound problems. He had an  MRI-proven osteomyelitis of the fibula. We felt he would benefit from a  saucerization. Discussed with him, if this does not work, we are  looking above-the-knee amputation, he agreed. NARRATIVE:  The patient was taken to the operating room, underwent a  general anesthetic. Right knee was prepped and draped in normal sterile  fashion. Timeout was taken. Consent was confirmed. Used a sharp blade  and began opening up that site. Getting into the fibula, saucerized  that removing bone. We also used an osteotome on that lateral side of  the tibia, removing bone. We sent this off for culture. Got good  bleeding tissue. Once that was thoroughly irrigated, we cauterized  bleeders. After thorough irrigation with chlorhexidine irrigation and  then packed the wound, dry dressing. The patient was then awakened and  returned to recovery room in good condition. POSTOPERATIVE PLAN:  He will be nonweightbearing on that side.   We will  get a wound VAC placed tomorrow and let that fill and if this does not  improving, we will consider going above the knee.         Da Sanon M.D.    D: 04/04/2019 10:32:04       T: 04/04/2019 13:50:58     [email or web address]  Job#: [id number]     Doc#: [id number]    CC:

## 2019-04-04 NOTE — PROGRESS NOTES
Patient is sitting in wheel chair. He is alert and oriented X4. Upper extremities are warm, dry, and pink. Hand grasp is strong bilaterally. Lung sounds are clear, depth is normal, chest rise is symmetrical. Heart is regular, S1 & S2 sounds. Abdomen is soft, round, and non-tender. Bowel sounds are active. Pulses are +2 bilaterally. Left leg pedal push and pull is strong, unable to access right pedal push and pull. Right stump appears to be pink, warm, and dry. Numbness and tingling stated in both upper and lower extremities. Patient states he has diabetic neuropathy. Patient rates pain 5/10, offered pillow for comfort measures, patient denies. Patient denies any other needs. Call light and bed side table within reach. Jose Meade SN/RSC.

## 2019-04-04 NOTE — PROGRESS NOTES
Hospitalist Progress Note    Patient:  Tanmay Cowart  YOB: 1970  MRN: [id number]   PCP: No primary care provider on file. Acct: [de-identified]  Unit/Bed: 7K-18/018-A    Date of Admission: 4/2/2019      ASSESSMENT     1. Diabetic ulcer, cellulitis/myostiis, acute on chronic osteomyelitis of the fibula of right lower extremity at [facility] I&d on 4/4  1. Seen on MRI on 4/3  2. Being followed by Dr. Isabel Romero. Appreciate recommendations  3. Previous cxs on 4/1 show Group B strep  4. On aztreonam and Vancomycin and antibiotic changed to Ceftriaxone on 4/3  5. Had I&D with Orthopedic Surgery on 4/4.   6. Wound care- possible wound vac? 7. Per Dr. Isabel Romero, extended care will best be performed at [facility] or [facility]. Patient does not want to go to [facility],  [place] is being considered. Otherwise patient wants to go home with IV antibiotic administration  2. Hyponatremia  1. Improved  3. Uncontrolled DM Type 2, HgbA1C 9.1  1. Increase Lantus to 50 units bid and titrate upwards to home dose of 55 units bid,  4. Chronic co-morbidities:  1. Essential HTN  2. Bipolar disorder  3. History of R BKA    PLAN     1. Increase Lantus to 50 units bid  2. Continue Ceftriaxone per Dr. Isabel Romero  3. Will f/u on cultures      Anticipated Discharge in : 2 to 5 days    Code Status: Full Code    Electronically signed by Blanche Arellano MD on 4/4/2019 at 4:30 PM      Chief Complaint     Right BKA  Infection      SUBJECTIVE     The patient is a 44 y.o. [facility] male who was admitted on 4/2/2019 for increased erythema, swelling and pain at right BKA for 3 days. Previous cxs grew Group B Strep    Patient had I&D of left lower extremity without amputation today.      OBJECTIVE     Medications:  Reviewed    Infusion Medications    dextrose       Scheduled Medications    insulin glargine  50 Units Subcutaneous BID    cefTRIAXone (ROCEPHIN) IV  2 g Intravenous Q24H    mirtazapine  15 mg Oral Nightly    sertraline  150 mg Oral Daily    sodium chloride flush  10 mL Intravenous 2 times per day    [Held by provider] enoxaparin  40 mg Subcutaneous Q24H    famotidine  20 mg Oral BID    insulin lispro  0-18 Units Subcutaneous TID     insulin lispro  0-9 Units Subcutaneous Nightly     PRN Meds: morphine **OR** morphine, cyclobenzaprine, HYDROcodone 5 mg - acetaminophen **OR** HYDROcodone 5 mg - acetaminophen, sodium chloride flush, magnesium hydroxide, ondansetron, glucose, dextrose, glucagon (rDNA), dextrose, magnesium sulfate, potassium chloride **OR** potassium alternative oral replacement **OR** potassium chloride, acetaminophen    Ins and outs:      Intake/Output Summary (Last 24 hours) at 4/4/2019 1630  Last data filed at 4/4/2019 1410  Gross per 24 hour   Intake 1171 ml   Output 1550 ml   Net -379 ml       Physical Examination     BP (!) 123/58   Pulse 95   Temp 98.1 °F (36.7 °C) (Oral)   Resp 14   Ht 5' 6\" (1.676 m)   Wt 222 lb (100.7 kg)   SpO2 96%   BMI 35.83 kg/m²     General appearance: No apparent distress. HEENT: Extraocular motion intact. Neck: Supple  Respiratory: Decreased breath sounds at bilateral lung bases. Cardiovascular: RRR with normal S1/S2 without murmurs, rubs or gallops. Abdomen: Soft, non-tender, non-distended with normal bowel sounds. Musculoskeletal: Right BKA. Right lower extremity in dressing  Neurologic: Grossly non focal. CN: II-XII intact  Psychiatric: Alert and oriented  Vascular: Radial pulses palpable bilaterally. Skin:  No visible rashes or lesions.     Labs     Recent Labs     04/02/19  1626 04/03/19 0618 04/04/19 0614   WBC 14.0* 10.4 9.0   HGB 12.7* 12.1* 12.1*   HCT 36.7* 35.2* 35.7*    222 248     Recent Labs     04/02/19  1626 04/03/19  0618 04/04/19  0614   * 133* 135   K 4.2 4.2 4.0   CL 92* 99 98   CO2 23 24 24   BUN 11 10 11   CREATININE 0.7 0.7 0.7   CALCIUM 9.1 8.6 8.7     Recent Labs     04/02/19  1626   AST 9   ALT 6*   BILIDIR <0.2   BILITOT 0.5   ALKPHOS 84     No results for input(s): INR in the last 72 hours. No results for input(s): Nam Gal in the last 72 hours. Urinalysis:      Lab Results   Component Value Date    NITRU NEGATIVE 02/15/2019    WBCUA 0-2 02/15/2019    BACTERIA NONE 02/15/2019    RBCUA 0-2 02/15/2019    BLOODU SMALL 02/15/2019    SPECGRAV >1.030 10/17/2018    GLUCOSEU >= 1000 02/15/2019       Diagnostic imaging/procedures       Ct Tibia Fibula Right W Contrast    Result Date: 4/1/2019  PROCEDURE: NONCONTRAST CT RIGHT LOWER EXTREMITY: CLINICAL INFORMATION: Prior below-the-knee interpretation. Recurrent cellulitis at the amputation site. TECHNIQUE: Multiple axial 3 mm images of the right lower extremity (with attention to the stomach) were obtained following the administration of intravenous contrast material. Computer generated sagittal and coronal images of the right lower extremity were also reconstructed. ALL CT SCANS AT THIS FACILITY use dose modulation, iterative reconstruction, and/or weight-based dosing when appropriate to reduce radiation dose to as low as reasonably achievable. FINDINGS: There is an open wound along the lateral aspect of the fibular head which was not \"on the prior study from February 15, suggesting possible wound dehiscence. There is persistent soft tissue swelling along the lateral aspect of the knee and extending into the stomach, as on the prior study, consistent with cellulitis. No definite drainable fluid collection is seen. No bone destruction is seen. The previous noted subcutaneous emphysema on the prior study has resolved. .     1. No evidence for osteomyelitis. Persistent findings of cellulitis involving the stump. No abscess or drainable fluid collection is seen. 2. Note that there is a deep wound along the lateral aspect of the fibular head, which was not present previously, suggesting possible wound dehiscence. Clinical correlation recommended.  **This report has been created using voice recognition software. It may contain minor errors which are inherent in voice recognition technology. ** Final report electronically signed by Dr. Judith Child on 4/1/2019 2:36 PM    Mri Tibula Fibula Right Wo Contrast    Result Date: 4/3/2019  PROCEDURE: MRI TIBIA FIBULA RIGHT WO CONTRAST CLINICAL INFORMATION: INFECTION, open wound, erythema and swelling on the BKA stump COMPARISON: No prior study. TECHNIQUE: Coronal T1 and STIR bilateral lower legs, axial T1, FST2 and STIR lower leg and sagittal T1 and STIR lower leg. FINDINGS: ALIGNMENT: Anatomic. MARROW/MARROW EDEMA/FRACTURE: Moderate marrow edema within the residual fibula suspicious for osteomyelitis. No definite tibial marrow edema. OSSEOUS DESTRUCTION/PERIOSTITIS: Small area of cortical destruction within the fibular stump. KNEE JOINTS: Moderate joint effusion at the knee. TENDONS: Unremarkable. MUSCULATURE: Marked muscular edema involving the anterolateral muscle group of the lower leg. There is severe such change in the lateral gastrocnemius. NEUROVASCULAR: Unremarkable. SOFT TISSUES: There is a large soft tissue ulcer laterally near the stump of the fibula. Marked soft tissue inflammation is seen throughout this region. Myositis and deep cellulitis are favored. There are a few areas of susceptibility artifact suspicious  for gas-forming infection. This extends down to the fibular stump. It abuts the tibial stump laterally without definite tibial marrow edema. OTHER: None. Moderate osteomyelitis within the residual fibula adjacent to an ulcer and marked adjacent cellulitis and myositis. **This report has been created using voice recognition software. It may contain minor errors which are inherent in voice recognition technology. ** Final report electronically signed by Dr. Agusto Wen on 4/3/2019 5:07 PM      DVT prophylaxis: ? Lovenox                                 ? SCDs                                 ? SQ Heparin                                 ? Encourage ambulation           ? Already on Anticoagulation     Disposition:    ? Home?       ? TCU       ? Rehab       ? Psych       ? SNF       ? [place]?        ? Other-

## 2019-04-04 NOTE — CONSULTS
Consults                                  CONSULTATION NOTE :ID       Patient - Brian Bautista,  Age - 44 y.o.    - 1970      Room Number - 7K-18/018-A   N -  [id number]   [facility]  Date of Admission -  2019  4:11 PM  Patient's PCP: No primary care provider on file. Requesting Physician: Lyndsey Bates MD    REASON FOR CONSULTATION     Infected right below knee amputation stump. HISTORY OF PRESENT ILLNESS       This is a very pleasant 44 y.o. male who was admitted to the hospital with a chief complaints of redness swelling of his right below-knee amputation stump. He has a nonhealing wound on his right lateral aspect of his below-knee amputation stump for which she has been following at the wound clinic. He had multiple debridements on the stump, he had previous debridement of the bone and was treated for infection. He noted over the last 3 days that there was more drainage, pain and swelling. He was advised to come to Hospital by his home health and was noted to have infection with group B streptococcus. He reports that his blood sugar has been running in the 300 and has not been very compliant with his medications. He has diabetes with neuropathy, hypertension and has history of bipolar disorder he was started on IV vancomycin and aztreonam he was seen by orthopedic [facility] planning to do incision and drainage. The MRI report was reviewed. Patient has extensive infection with associated osteomyelitis.     PAST MEDICAL  HISTORY       Past Medical History:   Diagnosis Date    Bipolar 2 disorder [facility])     previously followed with Dr. Rica Sharp and Silver Hernandez in hospitals Diabetes mellitus type 2, uncontrolled ([address]     Diabetic foot ulcer ([address]     Diabetic polyneuropathy ([address]     Diabetic ulcer of right foot associated with type 2 diabetes mellitus ([address] 12/10/2015    Essential hypertension     \"never been on b/p medication that I know of\"    GERD (gastroesophageal reflux disease)     Hammer toe of left foot     Heart murmur     denies any chest pain or palpitations    History of tobacco abuse     Hx of BKA, right (HCC)     Macular edema, diabetic, bilateral ([address] 05/04/2018    Dr. 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 ([facility] 6/29/2016       PAST SURGICAL HISTORY     Past Surgical History:   Procedure Laterality Date    ABSCESS DRAINAGE Right     foot    FOOT DEBRIDEMENT Right 07/01/2016    I & D    INCISION AND DRAINAGE Right 2/18/2019    I&D RIGHT STUMP performed by Tim Matthews MD at [address] [facility] Right 07/20/2016    OTHER SURGICAL HISTORY Right 1/14/15    sole of foot I&D    AR DRAIN INFECT SHOULDER BURSA Left 8/18/2017    LEFT SHOULDER INCISION AND DRAINAGE performed by Tim Matthews MD at [facility] OFFICE/OUTPT [facility] Right 9/20/2018    EXCISIONAL DEBRIDEMENT RIGHT BKA STUMP performed by Angus Veliz MD at [address] Right 1/16/15    2nd toe with wound vac applied    WISDOM TOOTH EXTRACTION  ? when         MEDICATIONS:       Scheduled Meds:   insulin glargine  30 Units Subcutaneous Nightly    cefTRIAXone (ROCEPHIN) IV  2 g Intravenous Q24H    mirtazapine  15 mg Oral Nightly    sertraline  150 mg Oral Daily    sodium chloride flush  10 mL Intravenous 2 times per day    [Held by provider] enoxaparin  40 mg Subcutaneous Q24H    famotidine  20 mg Oral BID    insulin lispro  0-18 Units Subcutaneous TID     insulin lispro  0-9 Units Subcutaneous Nightly     Continuous Infusions:   dextrose       PRN Meds:HYDROcodone 5 mg - acetaminophen **OR** HYDROcodone 5 mg - acetaminophen, sodium chloride flush, magnesium hydroxide, ondansetron, glucose, dextrose, glucagon (rDNA), dextrose, magnesium sulfate, potassium chloride **OR** potassium gallop. Abdomen:  Soft, non tender to palpation. Extremities:  He has a right below-knee amputation, there is open wound full-thickness on the lateral aspect of the stump, the stump was swollen red warm to touch   Skin:  Warm and dry. CNS: Oriented. LABS:     CBC:   Recent Labs     04/01/19  1316 04/02/19  1626 04/03/19  0618   WBC 14.5* 14.0* 10.4   HGB 13.2* 12.7* 12.1*    250 222     BMP:    Recent Labs     04/01/19  1316 04/02/19  1626 04/03/19  0618   * 128* 133*   K 4.4 4.2 4.2   CL 95* 92* 99   CO2 25 23 24   BUN 12 11 10   CREATININE 0.8 0.7 0.7   GLUCOSE 409* 408* 270*     Calcium:  Recent Labs     04/03/19  0618   CALCIUM 8.6     Ionized Calcium:No results for input(s): IONCA in the last 72 hours. Magnesium:  Recent Labs     04/03/19  0618   MG 2.0     Phosphorus:No results for input(s): PHOS in the last 72 hours. BNP:No results for input(s): BNP in the last 72 hours. Glucose:  Recent Labs     04/03/19  0622 04/03/19  1139 04/03/19  1916   POCGLU 288* 291* 333*     HgbA1C:   Recent Labs     04/02/19  1626   LABA1C 9.1*     INR: No results for input(s): INR in the last 72 hours.   Hepatic:   Recent Labs     04/02/19  1626   ALKPHOS 84   ALT 6*   AST 9   PROT 7.8   BILITOT 0.5   BILIDIR <0.2   LABALBU 3.5     Amylase and Lipase:  Recent Labs     04/02/19  1626   LACTA 1.7     Lactic Acid:   Recent Labs     04/02/19  1626   LACTA 1.7   IMAGING:    Micro:   Lab Results   Component Value Date    BC No growth-preliminary 04/02/2019       Problem list of patient      Patient Active Problem List   Diagnosis Code    Status post below knee amputation of right lower extremity ([facility] Z89.511    Gait disturbance R26.9    Sebaceous cyst L72.3    Uncontrolled type 2 diabetes mellitus with hyperglycemia, with long-term current use of insulin (HCC) E11.65, Z79.4    Severe bipolar II disorder, recent episode major depressive, remission ([facility]) F31.81    Bipolar affective disorder ([facility] F31.9    Leukocytosis D72.829    Normocytic anemia D64.9    Obesity (BMI 30-39. 9) E66.9    Infection of below knee amputation stump (HCC) T87.40    History of noncompliance with medical treatment Z91.19    Essential hypertension I10    Diabetic ulcer of lower leg (HCC) E11.622, L97.909    History of right below knee amputation (HCC) Z89.511    Tobacco dependence F17.200    History of osteomyelitis Z87.39    Gastroesophageal reflux disease without esophagitis K21.9    History of marijuana use Z87.898    Abscess of right leg L02.415    Physical debility R53.81    Diabetic ulcer of left fifth toe (HCC) E11.621, L97.529    Cellulitis of fifth toe, left L03.032    Amputation stump infection ([facility]) T87.40    Cellulitis of right lower extremity L03.115    Anemia D64.9    Electrolyte imbalance E87.8    Type 2 diabetes mellitus with circulatory disorder, with long-term current use of insulin ([facility]) E11.59, Z79.4    Weakness R53.1    Deep postoperative wound infection T81.42XA           Impression and Recommendation:   · Infected right below-knee amputation stump with group B streptococcus  · Chronic osteomyelitis of the stump with abscess. · Poorly controlled diabetes mellitus  · History of bipolar disorder  · Medical noncompliance. 1. Antibiotic was changed to IV ceftriaxone 2 g IV daily. Vancomycin and aztreonam was stopped. 2. We will continue packing of the wound daily. Patient will need surgical debridement of the stump. 3. Patient needs good control of his diabetes. I explained to patient that healing is impaired and the risk of infection is highly if diabetes is not well controlled. Infection Control:   · Standard precautions. Discharge Planning (Coordination of out patient care:   · He will need IV antibiotics. I'm thinking of sending him to an extended care facility or TCU. He needs education on medical compliance.     Thank you Yumiko Gan MD for allowing me to participate in this patient's care.     Cathi Duong MD,FACP 4/3/2019 8:45 PM

## 2019-04-04 NOTE — ANESTHESIA POSTPROCEDURE EVALUATION
Department of Anesthesiology  Postprocedure Note    Patient: Martín Singh  MRN: [id number]  YOB: 1970  Date of evaluation: 4/4/2019  Time:  6:15 PM     Procedure Summary     Date:  04/04/19 Room / Location:  [address] [place]    Anesthesia Start:  0954 Anesthesia Stop:  1030    Procedure:  I&D AND REVISION OF AMPUTATION RIGHT LEG (Right ) Diagnosis:  (infection)    Surgeon:  Junaid Dillard MD Responsible Provider:  Chema Davis DO    Anesthesia Type:  general ASA Status:  3          Anesthesia Type: general    Everette Phase I: Everette Score: 10    Everette Phase II:      Last vitals: Reviewed and per EMR flowsheets.        Anesthesia Post Evaluation    Patient location during evaluation: PACU  Patient participation: complete - patient participated  Level of consciousness: awake and alert  Airway patency: patent  Nausea & Vomiting: no nausea  Complications: no  Cardiovascular status: blood pressure returned to baseline and hemodynamically stable  Respiratory status: acceptable and spontaneous ventilation  Hydration status: euvolemic

## 2019-04-04 NOTE — PROGRESS NOTES
Progress note: Infectious diseases    Patient - Thanh Cruz,  Age - 44 y.o.    - 1970      Room Number - 7K-18/018-A   MRN -  [id number]   Acct # - [de-identified]  Date of Admission -  2019  4:11 PM    SUBJECTIVE:   He had surgery today. He has no complaints  He doesn't want to go to nursing home: he doesn't want to lose his apartment  OBJECTIVE   VITALS    height is 5' 6\" (1.676 m) and weight is 222 lb (100.7 kg). His oral temperature is 98.1 °F (36.7 °C). His blood pressure is 123/58 (abnormal) and his pulse is 95. His respiration is 14 and oxygen saturation is 96%. Wt Readings from Last 3 Encounters:   19 222 lb (100.7 kg)   19 222 lb (100.7 kg)   19 222 lb 1.6 oz (100.7 kg)       I/O (24 Hours)    Intake/Output Summary (Last 24 hours) at 2019 1443  Last data filed at 2019 1410  Gross per 24 hour   Intake 1856.98 ml   Output 1550 ml   Net 306.98 ml       General Appearance  Awake, alert, oriented,  not  In acute distress  HEENT - normocephalic, atraumatic, pink conjunctiva,  anicteric sclera  Neck - Supple, no mass  Lungs -  Bilateral good air entry, no rhonchi, no wheeze  Cardiovascular - Heart sounds are normal.  Regular rate and rhythm without murmur, gallop or rub.   Abdomen - soft, not distended, nontender,   Neurologic - oriented  Skin - No bruising or bleeding  Extremities - dressed right BKA    MEDICATIONS:      insulin glargine  30 Units Subcutaneous BID    cefTRIAXone (ROCEPHIN) IV  2 g Intravenous Q24H    mirtazapine  15 mg Oral Nightly    sertraline  150 mg Oral Daily    sodium chloride flush  10 mL Intravenous 2 times per day    [Held by provider] enoxaparin  40 mg Subcutaneous Q24H    famotidine  20 mg Oral BID    insulin lispro  0-18 Units Subcutaneous TID WC    insulin lispro  0-9 Units Subcutaneous Nightly      dextrose       morphine **OR** morphine, D64.9    Obesity (BMI 30-39. 9) E66.9    Infection of below knee amputation stump (HCC) T87.40    History of noncompliance with medical treatment Z91.19    Essential hypertension I10    Diabetic ulcer of right lower leg ([facility]) E11.622, L97.919    Hx of right BKA ([facility]) Z89.511    Tobacco dependence F17.200    History of osteomyelitis Z87.39    Gastroesophageal reflux disease without esophagitis K21.9    History of marijuana use Z87.898    Abscess of right leg L02.415    BKA stump complication ([facility]) W84.8    Physical debility R53.81    Diabetic ulcer of left fifth toe ([facility]) E11.621, L97.529    Cellulitis of fifth toe, left L03.032    Right BKA infection ([facility]) T87.43    Cellulitis of right lower extremity L03.115    Anemia D64.9    Electrolyte imbalance E87.8    DM type 2, uncontrolled, with lower extremity ulcer ([facility]) E11.622, E11.65, L97.909    Weakness R53.1    Deep postoperative wound infection T81.42XA    Infective myositis of right lower leg M60.061    Pyogenic inflammation of bone ([facility]) M86.9         ASSESSMENT/PLAN     · Infected right BKA stump: had debridment and revision of the stump  · Poorly controlled diabetes  · Will ask Estrellita evaluation: I am concerned of his non compliance with his diabetes which is contributing to the infection.   · Will arrange for iv antibiotic    Hayden Damon MD, [facility] 4/4/2019 2:43 PM

## 2019-04-04 NOTE — PROGRESS NOTES
Patient is resting in bed, watching television. Patient states trying to nap. Patient denies needs. Call light and bed side table within reach. K.Ahrns SN/RSC.

## 2019-04-04 NOTE — CARE COORDINATION
4/4/19, 12:06 PM    DISCHARGE BARRIERS    Patient is unable to go to TCU due to already using his days. Vee confirmed. Dr. Arron Watson aware.

## 2019-04-04 NOTE — CARE COORDINATION
4/4/19, 9:23 AM    DISCHARGE BARRIERS    Spoke with patient and he stated that he spoke to the [facility] and if he goes to an [facility] he could lose his room. Patient stated that he will not go to [facility]. Look at [facility], [place], operated by [facility] and patient has used his Medicare days and that not had a 60 day well break so TCU will likely not take patient. Patient stated that he will talk with ID about antibiotic options.

## 2019-04-05 LAB
ANION GAP SERPL CALCULATED.3IONS-SCNC: 11 MEQ/L (ref 8–16)
BASOPHILS # BLD: 0.4 %
BASOPHILS ABSOLUTE: 0 THOU/MM3 (ref 0–0.1)
BUN BLDV-MCNC: 12 MG/DL (ref 7–22)
CALCIUM SERPL-MCNC: 8.3 MG/DL (ref 8.5–10.5)
CHLORIDE BLD-SCNC: 101 MEQ/L (ref 98–111)
CO2: 23 MEQ/L (ref 23–33)
CREAT SERPL-MCNC: 0.7 MG/DL (ref 0.4–1.2)
EOSINOPHIL # BLD: 4.5 %
EOSINOPHILS ABSOLUTE: 0.4 THOU/MM3 (ref 0–0.4)
ERYTHROCYTE [DISTWIDTH] IN BLOOD BY AUTOMATED COUNT: 14.8 % (ref 11.5–14.5)
ERYTHROCYTE [DISTWIDTH] IN BLOOD BY AUTOMATED COUNT: 46.6 FL (ref 35–45)
GFR SERPL CREATININE-BSD FRML MDRD: > 90 ML/MIN/1.73M2
GLUCOSE BLD-MCNC: 151 MG/DL (ref 70–108)
GLUCOSE BLD-MCNC: 183 MG/DL (ref 70–108)
GLUCOSE BLD-MCNC: 202 MG/DL (ref 70–108)
GLUCOSE BLD-MCNC: 212 MG/DL (ref 70–108)
GLUCOSE BLD-MCNC: 266 MG/DL (ref 70–108)
HCT VFR BLD CALC: 31.5 % (ref 42–52)
HEMOGLOBIN: 10.6 GM/DL (ref 14–18)
IMMATURE GRANS (ABS): 0.06 THOU/MM3 (ref 0–0.07)
IMMATURE GRANULOCYTES: 0.6 %
LYMPHOCYTES # BLD: 16.8 %
LYMPHOCYTES ABSOLUTE: 1.6 THOU/MM3 (ref 1–4.8)
MCH RBC QN AUTO: 28.8 PG (ref 26–33)
MCHC RBC AUTO-ENTMCNC: 33.7 GM/DL (ref 32.2–35.5)
MCV RBC AUTO: 85.6 FL (ref 80–94)
MONOCYTES # BLD: 9.3 %
MONOCYTES ABSOLUTE: 0.9 THOU/MM3 (ref 0.4–1.3)
NUCLEATED RED BLOOD CELLS: 0 /100 WBC
PLATELET # BLD: 265 THOU/MM3 (ref 130–400)
PMV BLD AUTO: 9.2 FL (ref 9.4–12.4)
POTASSIUM SERPL-SCNC: 4.2 MEQ/L (ref 3.5–5.2)
RBC # BLD: 3.68 MILL/MM3 (ref 4.7–6.1)
SEG NEUTROPHILS: 68.4 %
SEGMENTED NEUTROPHILS ABSOLUTE COUNT: 6.4 THOU/MM3 (ref 1.8–7.7)
SODIUM BLD-SCNC: 135 MEQ/L (ref 135–145)
WBC # BLD: 9.3 THOU/MM3 (ref 4.8–10.8)

## 2019-04-05 PROCEDURE — 6370000000 HC RX 637 (ALT 250 FOR IP): Performed by: NURSE PRACTITIONER

## 2019-04-05 PROCEDURE — 1200000000 HC SEMI PRIVATE

## 2019-04-05 PROCEDURE — 6360000002 HC RX W HCPCS: Performed by: NURSE PRACTITIONER

## 2019-04-05 PROCEDURE — 97605 NEG PRS WND THER DME<=50SQCM: CPT

## 2019-04-05 PROCEDURE — 6370000000 HC RX 637 (ALT 250 FOR IP): Performed by: INTERNAL MEDICINE

## 2019-04-05 PROCEDURE — 82948 REAGENT STRIP/BLOOD GLUCOSE: CPT

## 2019-04-05 PROCEDURE — 2580000003 HC RX 258: Performed by: NURSE PRACTITIONER

## 2019-04-05 PROCEDURE — 99232 SBSQ HOSP IP/OBS MODERATE 35: CPT | Performed by: INTERNAL MEDICINE

## 2019-04-05 PROCEDURE — 80048 BASIC METABOLIC PNL TOTAL CA: CPT

## 2019-04-05 PROCEDURE — 36415 COLL VENOUS BLD VENIPUNCTURE: CPT

## 2019-04-05 PROCEDURE — 85025 COMPLETE CBC W/AUTO DIFF WBC: CPT

## 2019-04-05 RX ADMIN — HYDROCODONE BITARTRATE AND ACETAMINOPHEN 2 TABLET: 5; 325 TABLET ORAL at 13:24

## 2019-04-05 RX ADMIN — Medication 10 ML: at 21:35

## 2019-04-05 RX ADMIN — MIRTAZAPINE 15 MG: 15 TABLET, FILM COATED ORAL at 21:35

## 2019-04-05 RX ADMIN — HYDROCODONE BITARTRATE AND ACETAMINOPHEN 2 TABLET: 5; 325 TABLET ORAL at 21:43

## 2019-04-05 RX ADMIN — INSULIN GLARGINE 50 UNITS: 100 INJECTION, SOLUTION SUBCUTANEOUS at 09:06

## 2019-04-05 RX ADMIN — HYDROCODONE BITARTRATE AND ACETAMINOPHEN 2 TABLET: 5; 325 TABLET ORAL at 00:15

## 2019-04-05 RX ADMIN — Medication 10 ML: at 12:23

## 2019-04-05 RX ADMIN — CYCLOBENZAPRINE HYDROCHLORIDE 10 MG: 10 TABLET, FILM COATED ORAL at 04:46

## 2019-04-05 RX ADMIN — FAMOTIDINE 20 MG: 20 TABLET ORAL at 09:04

## 2019-04-05 RX ADMIN — FAMOTIDINE 20 MG: 20 TABLET ORAL at 21:35

## 2019-04-05 RX ADMIN — HYDROCODONE BITARTRATE AND ACETAMINOPHEN 2 TABLET: 5; 325 TABLET ORAL at 04:46

## 2019-04-05 RX ADMIN — INSULIN LISPRO 3 UNITS: 100 INJECTION, SOLUTION INTRAVENOUS; SUBCUTANEOUS at 17:25

## 2019-04-05 RX ADMIN — HYDROCODONE BITARTRATE AND ACETAMINOPHEN 2 TABLET: 5; 325 TABLET ORAL at 09:04

## 2019-04-05 RX ADMIN — HYDROCODONE BITARTRATE AND ACETAMINOPHEN 2 TABLET: 5; 325 TABLET ORAL at 17:25

## 2019-04-05 RX ADMIN — MORPHINE SULFATE 2 MG: 2 INJECTION, SOLUTION INTRAMUSCULAR; INTRAVENOUS at 01:52

## 2019-04-05 RX ADMIN — INSULIN LISPRO 3 UNITS: 100 INJECTION, SOLUTION INTRAVENOUS; SUBCUTANEOUS at 21:35

## 2019-04-05 RX ADMIN — INSULIN LISPRO 9 UNITS: 100 INJECTION, SOLUTION INTRAVENOUS; SUBCUTANEOUS at 12:23

## 2019-04-05 RX ADMIN — INSULIN GLARGINE 50 UNITS: 100 INJECTION, SOLUTION SUBCUTANEOUS at 21:35

## 2019-04-05 RX ADMIN — CEFTRIAXONE SODIUM 2 G: 2 INJECTION, POWDER, FOR SOLUTION INTRAMUSCULAR; INTRAVENOUS at 21:35

## 2019-04-05 RX ADMIN — INSULIN LISPRO 6 UNITS: 100 INJECTION, SOLUTION INTRAVENOUS; SUBCUTANEOUS at 09:05

## 2019-04-05 RX ADMIN — SERTRALINE 150 MG: 100 TABLET, FILM COATED ORAL at 09:04

## 2019-04-05 ASSESSMENT — PAIN DESCRIPTION - ONSET: ONSET: ON-GOING

## 2019-04-05 ASSESSMENT — PAIN SCALES - GENERAL
PAINLEVEL_OUTOF10: 10
PAINLEVEL_OUTOF10: 6
PAINLEVEL_OUTOF10: 8
PAINLEVEL_OUTOF10: 7
PAINLEVEL_OUTOF10: 7
PAINLEVEL_OUTOF10: 10
PAINLEVEL_OUTOF10: 10
PAINLEVEL_OUTOF10: 9
PAINLEVEL_OUTOF10: 7
PAINLEVEL_OUTOF10: 0
PAINLEVEL_OUTOF10: 10

## 2019-04-05 ASSESSMENT — PAIN DESCRIPTION - LOCATION
LOCATION: LEG

## 2019-04-05 ASSESSMENT — PAIN DESCRIPTION - PAIN TYPE
TYPE: SURGICAL PAIN

## 2019-04-05 ASSESSMENT — PAIN DESCRIPTION - PROGRESSION: CLINICAL_PROGRESSION: NOT CHANGED

## 2019-04-05 ASSESSMENT — PAIN DESCRIPTION - ORIENTATION
ORIENTATION: RIGHT

## 2019-04-05 ASSESSMENT — PAIN DESCRIPTION - DESCRIPTORS: DESCRIPTORS: THROBBING;SHOOTING

## 2019-04-05 ASSESSMENT — PAIN DESCRIPTION - FREQUENCY: FREQUENCY: CONTINUOUS

## 2019-04-05 ASSESSMENT — PAIN - FUNCTIONAL ASSESSMENT: PAIN_FUNCTIONAL_ASSESSMENT: ACTIVITIES ARE NOT PREVENTED

## 2019-04-05 NOTE — CARE COORDINATION
Discharge Planning Update Note:   [facility] Masters, is taking this case to [facility] - accept to [facility] for 2 weeks? Awaiting answer. Shaun Rodriguez and Dr Larissa Knight discussed case.

## 2019-04-05 NOTE — PROGRESS NOTES
Hospitalist Progress Note    Patient:  Tanmay Cowart  YOB: 1970  MRN: [id number]   PCP: No primary care provider on file. Acct: [de-identified]  Unit/Bed: 7K-18/018-A    Date of Admission: 4/2/2019      ASSESSMENT     1. 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 Seen on MRI on 4/3  2. Being followed by Dr. Isabel Romero. Appreciate recommendations  3. Previous cxs on 4/1 show Group B strep, new cx from 4/4 growing gram positive cocci  4. Was on  aztreonam and Vancomycin and antibiotic changed to Ceftriaxone on 4/3  5. Had I&D with Orthopedic Surgery on 4/4.   6. Wound care- possible wound vac? 7. Per Dr. Isabel Romero, extended care will best be performed at [facility] or [facility]. Patient does not want to go to [facility],  [place] is being considered. Otherwise patient wants to go home with IV antibiotic administration  2. Hyponatremia  1. Improved  3. Uncontrolled DM Type 2, HgbA1C 9.1  1. Increase Lantus to 50 units bid and titrate upwards to home dose of 55 units bid,  4. Chronic co-morbidities:  1. Essential HTN  2. Bipolar disorder  3. History of R BKA    PLAN     1. Plan to discharge to [place] if possible. Case is currently being reviewed  2. Continue Ceftriaxone per Dr. Isabel Romero  3. Will f/u on cultures      Anticipated Discharge in : 2 to 3 days    Code Status: Full Code    Electronically signed by Blanche Arellano MD on 4/5/2019 at 4:38 PM      Chief Complaint     Right BKA  Infection      SUBJECTIVE     The patient is a 44 y.o. Silver Waldroner yo male who was admitted on 4/2/2019 for increased erythema, swelling and pain at right BKA for 3 days. Previous cxs grew Group B Strep    - Patient stable.  Reports pain 101/10 at right lower extremity    OBJECTIVE     Medications:  Reviewed    Infusion Medications    dextrose       Scheduled Medications    insulin glargine  50 Units Subcutaneous BID    cefTRIAXone (ROCEPHIN) IV  2 g Intravenous Q24H  mirtazapine  15 mg Oral Nightly    sertraline  150 mg Oral Daily    sodium chloride flush  10 mL Intravenous 2 times per day    [Held by provider] enoxaparin  40 mg Subcutaneous Q24H    famotidine  20 mg Oral BID    insulin lispro  0-18 Units Subcutaneous TID WC    insulin lispro  0-9 Units Subcutaneous Nightly     PRN Meds: morphine **OR** morphine, cyclobenzaprine, HYDROcodone 5 mg - acetaminophen **OR** HYDROcodone 5 mg - acetaminophen, sodium chloride flush, magnesium hydroxide, ondansetron, glucose, dextrose, glucagon (rDNA), dextrose, magnesium sulfate, potassium chloride **OR** potassium alternative oral replacement **OR** potassium chloride, acetaminophen    Ins and outs:      Intake/Output Summary (Last 24 hours) at 4/5/2019 1638  Last data filed at 4/5/2019 1408  Gross per 24 hour   Intake 1350 ml   Output 1575 ml   Net -225 ml       Physical Examination     BP (!) 101/57   Pulse 99   Temp 98.4 °F (36.9 °C) (Oral)   Resp 18   Ht 5' 6\" (1.676 m)   Wt 222 lb (100.7 kg)   SpO2 96%   BMI 35.83 kg/m²     General appearance: No apparent distress. HEENT: Extraocular motion intact. Neck: Supple  Respiratory: Decreased breath sounds at bilateral lung bases. Cardiovascular: RRR with normal S1/S2 without murmurs, rubs or gallops. Abdomen: Soft, non-tender, non-distended with normal bowel sounds. Musculoskeletal: Right BKA. Right lower extremity in dressing  Neurologic: Grossly non focal. CN: II-XII intact  Psychiatric: Alert and oriented  Vascular: Radial pulses palpable bilaterally. Skin:  No visible rashes or lesions.     Labs     Recent Labs     04/03/19 0618 04/04/19 0614 04/05/19  0645   WBC 10.4 9.0 9.3   HGB 12.1* 12.1* 10.6*   HCT 35.2* 35.7* 31.5*    248 265     Recent Labs     04/03/19 0618 04/04/19  0614 04/05/19  0645   * 135 135   K 4.2 4.0 4.2   CL 99 98 101   CO2 24 24 23   BUN 10 11 12   CREATININE 0.7 0.7 0.7   CALCIUM 8.6 8.7 8.3*     No results for ? SQ Heparin                                 ? Encourage ambulation           ? Already on Anticoagulation     Disposition:    ? Home?       ? TCU       ? Rehab       ? Psych       ? SNF       ? [place]? [place]?        ? Other-

## 2019-04-05 NOTE — PLAN OF CARE
Problem: Discharge Planning:  Goal: Participates in care planning  Description  Participates in care planning  Outcome: Met This Shift  Note:   Pt was from a group home plans are for him to return possibly with home health     Problem: Activity Intolerance:  Goal: Ability to tolerate increased activity will improve  Description  Ability to tolerate increased activity will improve  Outcome: Met This Shift  Note:   Pt does very well with transfers from wc to bed      Problem: Anxiety/Stress:  Goal: Level of anxiety will decrease  Description  Level of anxiety will decrease  Outcome: Met This Shift  Note:   Pt has done well this shift thus far currently resting with eyes closed with no distress noted     Problem: Pain:  Goal: Ability to notify healthcare provider of pain before it becomes unmanageable or unbearable will improve  Description  Ability to notify healthcare provider of pain before it becomes unmanageable or unbearable will improve  Outcome: Ongoing  Note:   Pt is tolerating oral pain medication with restful periods     Problem: Skin Integrity - Impaired:  Goal: Will show no infection signs and symptoms  Description  Will show no infection signs and symptoms  Outcome: Met This Shift  Note:   Pt has no breakdown to coccyx area     Problem: Infection:  Goal: Will remain free from infection  Description  Will remain free from infection  Outcome: Met This Shift  Note:   Pt is showing no s/s of infection     Problem: Daily Care:  Goal: Daily care needs are met  Description  Daily care needs are met  Outcome: Met This Shift  Note:   Patient assessed as independent to perform ADLs. Patient voicing needs appropriately. Encouraging and promoting as much  independence as possible per patient ability and assisting with ADLS and hygiene needs as needed. Skin and mucous membranes appropriate. Continuing to assess daily care needs.       Problem: Falls - Risk of:  Goal: Will remain free from falls  Description  Will remain free from falls  Outcome: Met This Shift  Note:   Patient free from falls this shift. Patient fall risk r/t. Continues on falling star program, siderails upx2-3, bed alarm on, call light in reach, bed in low and locked position. Hourly checks preformed, potty, position, pain, pathway and possessions assessed. Continuing to monitor. Problem: Falls - Risk of:  Goal: Absence of physical injury  Description  Absence of physical injury  Outcome: Met This Shift  Note:   Patient free from injury/harm this shift. Complying well with medical devices and following safety precautions. Fall risk continues to be assessed. Fall precautions in place, 5 P's used to provide safe environment. Bed in low and locked position, call light in reach, side rails upx2-3. Needs being met. Continuing to monitor. Problem: Nutrition  Goal: Optimal nutrition therapy  Outcome: Met This Shift  Note:   Pt is eating and drinking without nausea   Care plan reviewed with patient. Patient does verbalize understanding of the plan of care and contribute to goal setting.

## 2019-04-05 NOTE — PROGRESS NOTES
Present to pt room for order to apply wound vac to right stump wound. Pt s/p saucerization right stump with post surgical dressing intact. Dressing removed. Wound assessment per flowsheet below. Cleansed wound with normal saline and gauze. Applied skin prep to rosita wound. Applied stoma wafer to rosita wound to protect good skin. Applied snaked foam x 1 piece to wound bed followed by clear drape. Cut quarter size hole in center of drape over sponge area and apply vac suction. Wound vac settings at 125 mmHg continuous suction. Staff to apply extra clear drape as needed if leaks noted and to change canister as needed when full. Wrapped right stump with kerlix followed by lightly wrapped ACE. Wound ostomy to change wound vac three times weekly. Pt in bed, call light in reach. Bed in low. Updated primary nurse. Wound Care Documentation:  Wound 02/20/19 Other (Comment) Right;Lateral (Active)   Wound Image   4/5/2019  1:24 PM   Wound Non-Healing Surgical 4/5/2019  1:24 PM   Dressing Status Changed 4/5/2019  1:24 PM   Dressing Changed Changed/New 4/5/2019  1:24 PM   Dressing/Treatment Vacuum dressing 4/5/2019  1:24 PM   Wound Cleansed Rinsed/Irrigated with saline 4/5/2019  1:24 PM   Dressing Change Due 04/08/19 4/5/2019  1:24 PM   Wound Length (cm) 10.5 cm 4/5/2019  1:24 PM   Wound Width (cm) 3.5 cm 4/5/2019  1:24 PM   Wound Depth (cm) 2.7 cm 4/5/2019  1:24 PM   Wound Surface Area (cm^2) 36.75 cm^2 4/5/2019  1:24 PM   Change in Wound Size % (l*w) -3728.12 4/5/2019  1:24 PM   Wound Volume (cm^3) 99.22 cm^3 4/5/2019  1:24 PM   Wound Healing % -7250 4/5/2019  1:24 PM   Distance Tunneling (cm) 1.7 cm 3/27/2019 10:59 AM   Tunneling Position ___ O'Clock 12 3/27/2019 10:59 AM   Wound Assessment Black;Red;Yellow 4/5/2019  1:24 PM   Drainage Amount Moderate 4/5/2019  1:24 PM   Drainage Description Serosanguinous 4/5/2019  1:24 PM   Odor None 4/5/2019  1:24 PM   Margins Attached edges; Unattached edges 3/27/2019 10:59 AM   Rosita-wound Assessment Maceration;Pink 4/5/2019  1:24 PM   Red%Wound Bed 30 4/5/2019  1:24 PM   Yellow%Wound Bed 30 4/5/2019  1:24 PM   Black%Wound Bed 40 4/5/2019  1:24 PM   Number of days: 43

## 2019-04-05 NOTE — PROGRESS NOTES
Progress note: Infectious diseases    Patient - Missy Rahman,  Age - 44 y.o.    - 1970      Room Number - 7K-18/018-A   MRN -  [id number]   Acct # - [de-identified]  Date of Admission -  2019  4:11 PM    SUBJECTIVE:   He has no new complaints. He is tolerating the IV antibiotic. OBJECTIVE   VITALS    height is 5' 6\" (1.676 m) and weight is 222 lb (100.7 kg). His oral temperature is 98.5 °F (36.9 °C). His blood pressure is 118/61 and his pulse is 89. His respiration is 18 and oxygen saturation is 95%. Wt Readings from Last 3 Encounters:   19 222 lb (100.7 kg)   19 222 lb (100.7 kg)   19 222 lb 1.6 oz (100.7 kg)       I/O (24 Hours)    Intake/Output Summary (Last 24 hours) at 2019 1251  Last data filed at 2019 0849  Gross per 24 hour   Intake 1201 ml   Output 1575 ml   Net -374 ml       General Appearance  Awake, alert, oriented,  not  In acute distress  HEENT - normocephalic, atraumatic, pink conjunctiva,  anicteric sclera  Neck - Supple, no mass  Lungs -  Bilateral good air entry, no rhonchi, no wheeze  Cardiovascular - Heart sounds are normal.  Regular rate and rhythm without murmur, gallop or rub.   Abdomen - soft, not distended, nontender,   Neurologic - oriented  Skin - No bruising or bleeding  Extremities - dressed right BKA    MEDICATIONS:      insulin glargine  50 Units Subcutaneous BID    cefTRIAXone (ROCEPHIN) IV  2 g Intravenous Q24H    mirtazapine  15 mg Oral Nightly    sertraline  150 mg Oral Daily    sodium chloride flush  10 mL Intravenous 2 times per day    [Held by provider] enoxaparin  40 mg Subcutaneous Q24H    famotidine  20 mg Oral BID    insulin lispro  0-18 Units Subcutaneous TID     insulin lispro  0-9 Units Subcutaneous Nightly      dextrose       morphine **OR** morphine, cyclobenzaprine, HYDROcodone 5 mg - acetaminophen **OR** HYDROcodone 5 mg -

## 2019-04-05 NOTE — PROGRESS NOTES
Orthopaedic Progress Note      SUBJECTIVE   Mr. Antonino Asif is post op day # 1 s/p Saucerization, right tibia and fibula  Dry drsg on R stump, plan for wound vac to be placed today and changes per protocol   Pt states pain is tolerable   hgb 10.6   Monitoring cultures- negative thus far   ID following for abx, possible plan for [facility] at discharge for IV abx and wound vac changes   Pt known for medical non-compliance, continued elevated glucose   If infection and wounds do not improve did discuss with pt possibility of an AKA         Allergies:     Allergies as of 04/02/2019 - Review Complete 04/02/2019   Allergen Reaction Noted    Pcn [penicillins] Shortness Of Breath, Other (See Comments), and Nausea And Vomiting 11/15/2014    Clindamycin/lincomycin Itching 09/29/2015     Current Inpatient Medications:  Current Facility-Administered Medications: morphine (PF) injection 2 mg, 2 mg, Intravenous, Q2H PRN **OR** morphine injection 4 mg, 4 mg, Intravenous, Q2H PRN  cyclobenzaprine (FLEXERIL) tablet 10 mg, 10 mg, Oral, TID PRN  insulin glargine (LANTUS) injection vial 50 Units, 50 Units, Subcutaneous, BID  HYDROcodone-acetaminophen (NORCO) 5-325 MG per tablet 1 tablet, 1 tablet, Oral, Q4H PRN **OR** HYDROcodone-acetaminophen (NORCO) 5-325 MG per tablet 2 tablet, 2 tablet, Oral, Q4H PRN  cefTRIAXone (ROCEPHIN) 2 g IVPB in D5W 50ml minibag, 2 g, Intravenous, Q24H  mirtazapine (REMERON) tablet 15 mg, 15 mg, Oral, Nightly  sertraline (ZOLOFT) tablet 150 mg, 150 mg, Oral, Daily  sodium chloride flush 0.9 % injection 10 mL, 10 mL, Intravenous, 2 times per day  sodium chloride flush 0.9 % injection 10 mL, 10 mL, Intravenous, PRN  magnesium hydroxide (MILK OF MAGNESIA) 400 MG/5ML suspension 30 mL, 30 mL, Oral, Daily PRN  ondansetron (ZOFRAN) injection 4 mg, 4 mg, Intravenous, Q6H PRN  [Held by provider] enoxaparin (LOVENOX) injection 40 mg, 40 mg, Subcutaneous, Q24H  glucose (GLUTOSE) 40 % oral gel 15 g, 15 g, Oral, PRN  dextrose 15 100 %   19 1050 117/68 -- -- 86 17 100 %   19 1045 129/70 -- -- 86 12 100 %   19 1040 127/68 -- -- 91 9 93 %   19 1035 125/66 -- -- 91 13 93 %   19 1030 121/65 -- -- 88 12 93 %   19 1026 121/65 98.4 °F (36.9 °C) Temporal 90 16 96 %     INTAKE/OUTPUT:    Intake/Output Summary (Last 24 hours) at 2019 0917  Last data filed at 2019 0849  Gross per 24 hour   Intake 1451 ml   Output 1575 ml   Net -124 ml     I/O last 3 completed shifts: In: 3718 [P.O.:750; I.V.:651; IV Piggyback:50]  Out: 1550 [UYXA]    PHYSICAL EXAM:  General appearance:  Alert and oriented x 3. No apparent distress, appears stated age and cooperative. HEENT:  Normal cephalic, atraumatic without obvious deformity. Conjunctivae/corneas clear. Neck: Supple, with full range of motion. Trachea midline. Respiratory:  Normal respiratory effort. No audible Wheezes and Rhonchi. Cardiovascular:  Regular rate and rhythm. Abdomen: Soft, non-tender, non-distended. Musculoskeletal: RLE:  Denies calf pain to palpation. good range of motion without deformity R knee. Pt can flex and extend right knee. R stump drsg dry and intact. No redness to skin above drsg. Skin: Skin color normal.  No rashes or lesions. Neurologic:  Neurovascularly intact without any focal sensory/motor deficits. Sensation intact.    Capillary Refill: Brisk,< 3 seconds   Peripheral Pulses: +2 palpable, equal bilaterally    Data  CBC:   Lab Results   Component Value Date    WBC 9.3 2019    HGB 10.6 2019     2019     BMP:    Lab Results   Component Value Date     2019    K 4.2 2019    K 4.2 2019     2019    CO2 23 2019    BUN 12 2019    CREATININE 0.7 2019    CALCIUM 8.3 2019    GLUCOSE 183 2019     Uric Acid:  No components found for: URIC  PT/INR:    Lab Results   Component Value Date    PROTIME 12.2 2019    INR 1.06 2019 Troponin:  No results found for: TROPONINI  Urine Culture:  No components found for: AIDEEINE    ASSESSMENT:  Active Hospital Problems    Diagnosis Date Noted    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]     Pyogenic inflammation of bone (HCC) [M86.9]     Deep postoperative wound infection [T81.42XA] 04/02/2019    DM type 2, uncontrolled, with lower extremity ulcer ([address] [W01.691, E11.65, L97.909] 02/15/2019    Cellulitis of right lower extremity [L03.115] 02/15/2019    Right BKA infection ([address] [T87.43] 02/15/2019    BKA stump complication ([address] [T92.9]     Diabetic ulcer of right lower leg ([address] [C82.087, L97.919]     Hx of right BKA ([address] [Z89.511]     Essential hypertension [I10]     Infection of below knee amputation stump (HCC) [T87.40]     Obesity (BMI 30-39. 9) [E66.9]     Hyponatremia [E87.1]     Bipolar 1 disorder ([address] [F31.9]        PLAN  1. Dry drsg changes as needed, wound vac to be placed today then changes M-W-F    2. NWB RLE  3. PT/OT to eval and treat   4. Continue current medical management   5. ID following cultures and abx   6.  [facility] eval     Electronically signed by MARILYN Ramachandran CNP on 4/5/2019 at 9:14 AM

## 2019-04-05 NOTE — PLAN OF CARE
Problem: Nutrition  Goal: Optimal nutrition therapy  4/5/2019 1007 by Corie Ruby RD, LD  Outcome: Ongoing  4/5/2019 0038 by Kirt Jaramillo RN  Outcome: Met This Shift  Note:   Pt is eating and drinking without nausea   Nutrition Problem: Increased nutrient needs  Intervention: Food and/or Nutrient Delivery: Modify current ONS  Nutritional Goals: Patient will consume 75% or more of meals to aid in healing during LOS.

## 2019-04-05 NOTE — PROGRESS NOTES
Nutrition Assessment    Type and Reason for Visit: Reassess(po/wt. check)    Nutrition Recommendations: Will reduce glucerna to 4oz. Pt. agreeable. Continue current diet. Consider MVI. Suggest weigh pt. Nutrition Assessment  Pt. with no improvement from a nutritional standpoint AEB good po intake. Remains at risk for further nutritional compromise r/t increased nutrient needs for healing, hx of DM. Will reduce Glucerna to 4oz TID. Offered diet education, pt. declined. Encouraged lean protein choice with meals to aid with wound healing.   : Labs: (4/5) Glucose 185, chemsticks 202-254, Hemoglobin 10.6. (4/2) A1C 9. 1. Meds: Lantus, humalog, Insulin Regular, milk of magnesia, Zofran, Klor-con M. Malnutrition Assessment:  · Malnutrition Status: No malnutrition    Nutrition Risk Level: Moderate    Nutrient Needs:  · Estimated Daily Total Kcal: 8421-0683 kcals (30-33 kcal/kgm - ideal 61kgm)  · Estimated Daily Protein (g):  grams (1.5-2 grams protein/kgm - ideal 61kgm)    Nutrition Diagnosis:   · Problem: Increased nutrient needs  · Etiology: related to Increased demand for energy/nutrients     Signs and symptoms:  as evidenced by Presence of wounds    Objective Information:  · Nutrition-Focused Physical Findings: Pt. seen, denies n/v/d/c. He mentions had a BM 4/3 ( not documented). Good appetite, Pt. adament we still send Glucerna despite elevated blood sugars but was willing to reduce it by 1/2 ( dislikes Pascual/proteinex), I reduced carb choices/meal He refuses to have carb servings reduced at meals.   · Wound Type: (right stump wound; 2/18/19 I & D stump)  · Current Nutrition Therapies:  · Oral Diet Orders: Carb Control 4 Carbs/Meal   · Oral Diet intake: %  · Oral Nutrition Supplement (ONS) Orders: Diabetic Oral Supplement( 4oz Glucerna TID (instead of 8 oz-new) )  · ONS intake: %  · Anthropometric Measures:  · Ht: 5' 6\" (167.6 cm)   · Current Body Wt: 222 lb 0.1 oz (100.7 kg)  · Admission Body Wt: 222 lb (100.7 kg)(stated 4/2/19; no edema)  · Usual Body Wt: (235# Feb 2019 per patient; per EHR: 3/14/19 217# 1.6oz, 2/21/19 222# 7.1oz, 9/21/18 219# 6.4oz)  · % Weight Change:  ,  unable to determine, no wt. · Ideal Body Wt: 134 lb (60.8 kg),BMI Classification: BMI > or equal to 40.0 Obese Class III(adjusted for BKA: 40.3 (based on stated weight))    Nutrition Interventions:   Modify current ONS  Continued Inpatient Monitoring, Coordination of Care    Nutrition Evaluation:   · Evaluation: Progressing toward goals   · Goals: Patient will consume 75% or more of meals to aid in healing during LOS.      · Monitoring: Meal Intake, Supplement Intake, Diet Tolerance, Skin Integrity, Wound Healing, Weight, Pertinent Labs, Monitor Bowel Function      Electronically signed by Thien Humphreys RD, LD on 4/5/19 at 10:10 AM    Contact Number: [phone]

## 2019-04-06 LAB
GLUCOSE BLD-MCNC: 137 MG/DL (ref 70–108)
GLUCOSE BLD-MCNC: 171 MG/DL (ref 70–108)
GLUCOSE BLD-MCNC: 190 MG/DL (ref 70–108)
GLUCOSE BLD-MCNC: 276 MG/DL (ref 70–108)

## 2019-04-06 PROCEDURE — 2580000003 HC RX 258: Performed by: NURSE PRACTITIONER

## 2019-04-06 PROCEDURE — 82948 REAGENT STRIP/BLOOD GLUCOSE: CPT

## 2019-04-06 PROCEDURE — 6360000002 HC RX W HCPCS: Performed by: NURSE PRACTITIONER

## 2019-04-06 PROCEDURE — 1200000000 HC SEMI PRIVATE

## 2019-04-06 PROCEDURE — 6370000000 HC RX 637 (ALT 250 FOR IP): Performed by: NURSE PRACTITIONER

## 2019-04-06 PROCEDURE — 6370000000 HC RX 637 (ALT 250 FOR IP): Performed by: INTERNAL MEDICINE

## 2019-04-06 PROCEDURE — 99232 SBSQ HOSP IP/OBS MODERATE 35: CPT | Performed by: INTERNAL MEDICINE

## 2019-04-06 RX ORDER — INSULIN GLARGINE 100 [IU]/ML
55 INJECTION, SOLUTION SUBCUTANEOUS 2 TIMES DAILY
Status: DISCONTINUED | OUTPATIENT
Start: 2019-04-06 | End: 2019-04-10 | Stop reason: HOSPADM

## 2019-04-06 RX ADMIN — MORPHINE SULFATE 2 MG: 2 INJECTION, SOLUTION INTRAMUSCULAR; INTRAVENOUS at 11:14

## 2019-04-06 RX ADMIN — FAMOTIDINE 20 MG: 20 TABLET ORAL at 09:27

## 2019-04-06 RX ADMIN — Medication 10 ML: at 09:28

## 2019-04-06 RX ADMIN — HYDROCODONE BITARTRATE AND ACETAMINOPHEN 2 TABLET: 5; 325 TABLET ORAL at 09:27

## 2019-04-06 RX ADMIN — MIRTAZAPINE 15 MG: 15 TABLET, FILM COATED ORAL at 21:01

## 2019-04-06 RX ADMIN — INSULIN LISPRO 3 UNITS: 100 INJECTION, SOLUTION INTRAVENOUS; SUBCUTANEOUS at 09:31

## 2019-04-06 RX ADMIN — CEFTRIAXONE SODIUM 2 G: 2 INJECTION, POWDER, FOR SOLUTION INTRAMUSCULAR; INTRAVENOUS at 21:02

## 2019-04-06 RX ADMIN — INSULIN GLARGINE 55 UNITS: 100 INJECTION, SOLUTION SUBCUTANEOUS at 09:31

## 2019-04-06 RX ADMIN — INSULIN LISPRO 5 UNITS: 100 INJECTION, SOLUTION INTRAVENOUS; SUBCUTANEOUS at 21:02

## 2019-04-06 RX ADMIN — HYDROCODONE BITARTRATE AND ACETAMINOPHEN 2 TABLET: 5; 325 TABLET ORAL at 21:01

## 2019-04-06 RX ADMIN — INSULIN LISPRO 3 UNITS: 100 INJECTION, SOLUTION INTRAVENOUS; SUBCUTANEOUS at 11:14

## 2019-04-06 RX ADMIN — SERTRALINE 150 MG: 100 TABLET, FILM COATED ORAL at 09:28

## 2019-04-06 RX ADMIN — FAMOTIDINE 20 MG: 20 TABLET ORAL at 21:01

## 2019-04-06 RX ADMIN — HYDROCODONE BITARTRATE AND ACETAMINOPHEN 2 TABLET: 5; 325 TABLET ORAL at 05:02

## 2019-04-06 RX ADMIN — Medication 10 ML: at 21:01

## 2019-04-06 RX ADMIN — INSULIN GLARGINE 55 UNITS: 100 INJECTION, SOLUTION SUBCUTANEOUS at 21:01

## 2019-04-06 ASSESSMENT — PAIN SCALES - GENERAL
PAINLEVEL_OUTOF10: 10
PAINLEVEL_OUTOF10: 10
PAINLEVEL_OUTOF10: 0
PAINLEVEL_OUTOF10: 0
PAINLEVEL_OUTOF10: 4
PAINLEVEL_OUTOF10: 6
PAINLEVEL_OUTOF10: 2
PAINLEVEL_OUTOF10: 6

## 2019-04-06 ASSESSMENT — PAIN DESCRIPTION - DESCRIPTORS
DESCRIPTORS: THROBBING

## 2019-04-06 ASSESSMENT — PAIN DESCRIPTION - FREQUENCY
FREQUENCY: INTERMITTENT
FREQUENCY: INTERMITTENT

## 2019-04-06 ASSESSMENT — PAIN DESCRIPTION - ONSET
ONSET: ON-GOING
ONSET: ON-GOING

## 2019-04-06 ASSESSMENT — PAIN DESCRIPTION - PROGRESSION
CLINICAL_PROGRESSION: GRADUALLY WORSENING

## 2019-04-06 ASSESSMENT — PAIN DESCRIPTION - PAIN TYPE
TYPE: SURGICAL PAIN
TYPE: ACUTE PAIN;SURGICAL PAIN

## 2019-04-06 ASSESSMENT — PAIN - FUNCTIONAL ASSESSMENT
PAIN_FUNCTIONAL_ASSESSMENT: 0-10
PAIN_FUNCTIONAL_ASSESSMENT: ACTIVITIES ARE NOT PREVENTED
PAIN_FUNCTIONAL_ASSESSMENT: ACTIVITIES ARE NOT PREVENTED

## 2019-04-06 ASSESSMENT — PAIN DESCRIPTION - LOCATION
LOCATION: LEG
LOCATION: LEG

## 2019-04-06 ASSESSMENT — PAIN DESCRIPTION - ORIENTATION
ORIENTATION: RIGHT
ORIENTATION: RIGHT

## 2019-04-06 NOTE — PLAN OF CARE
Problem: Discharge Planning:  Goal: Participates in care planning  Description  Participates in care planning  4/6/2019 0229 by Edie Villegas RN  Outcome: Ongoing  Note:   Pt able to participate in discharge planning     Problem: Discharge Planning:  Goal: Discharged to appropriate level of care  Description  Discharged to appropriate level of care  4/6/2019 0229 by Edie Villegas RN  Outcome: Ongoing  Note:   Pt may go to [place] at discharge     Problem: Activity Intolerance:  Goal: Ability to tolerate increased activity will improve  Description  Ability to tolerate increased activity will improve  4/6/2019 0229 by Edie Villegas RN  Outcome: Ongoing  Note:   Pt able to get into wheelchair by himself     Problem: Anxiety/Stress:  Goal: Level of anxiety will decrease  Description  Level of anxiety will decrease  4/6/2019 0229 by Edie Villegas RN  Outcome: Ongoing  Note:   Pt denies anxiety     Problem: Pain:  Goal: Ability to notify healthcare provider of pain before it becomes unmanageable or unbearable will improve  Description  Ability to notify healthcare provider of pain before it becomes unmanageable or unbearable will improve  4/6/2019 0229 by Edie Villegas RN  Outcome: Ongoing  Note:   Pt notifies staff when in need of pain meds     Problem: Serum Glucose Level - Abnormal:  Goal: Ability to maintain appropriate glucose levels will improve to within specified parameters  Description  Ability to maintain appropriate glucose levels will improve to within specified parameters  4/6/2019 0229 by Edie Villegas RN  Outcome: Ongoing  Note:   Pt receiving insulin when needed for high blood sugars     Problem: Skin Integrity - Impaired:  Goal: Will show no infection signs and symptoms  Description  Will show no infection signs and symptoms  4/6/2019 0229 by Edie Villegas RN  Outcome: Ongoing  Note:   Rt BKA dressing is D & I. No skin breakdown noted.      Problem: Skin Integrity - Impaired:  Goal: Absence of new skin breakdown  Description  Absence of new skin breakdown  4/6/2019 0229 by Meena Anglin RN  Outcome: Ongoing  Note:   Pt has no skin breakdown     Problem: Infection:  Goal: Will remain free from infection  Description  Will remain free from infection  4/6/2019 0229 by Meena Anglin RN  Outcome: Ongoing  Note:   Pt has been afebrile. Problem: Daily Care:  Goal: Daily care needs are met  Description  Daily care needs are met  4/6/2019 0229 by Meena Anglin RN  Outcome: Ongoing  Note:   Pt needs minimal assistance with ADLs     Problem: Falls - Risk of:  Goal: Will remain free from falls  Description  Will remain free from falls  4/6/2019 0229 by Meena Anglin RN  Outcome: Ongoing  Note:   No falls this shift. Pt using call light appropriately to call for assistance with ambulation to the bathroom and to chair. Pt is also compliant with use of non-skid slippers. Pt reports understanding of fall prevention when discussed. Problem: Falls - Risk of:  Goal: Absence of physical injury  Description  Absence of physical injury  4/6/2019 0229 by Meena Anglin RN  Outcome: Ongoing  Note:   Pt free from injury this shift     Problem: DISCHARGE BARRIERS  Goal: Patient's continuum of care needs are met  4/6/2019 0229 by Meena Anglin RN  Outcome: Ongoing  Note:   Discharge planning in process, may go to [place]     Problem: Nutrition  Goal: Optimal nutrition therapy  4/6/2019 0229 by Meena Anglin RN  Outcome: Ongoing  Note:   Pt tolerating carb control diet    Care plan reviewed with patient. Patient verbalize understanding of the plan of care and contribute to goal setting.

## 2019-04-06 NOTE — PROGRESS NOTES
Orthopaedic Progress Note      SUBJECTIVE   Mr. Leslie Rich is post op day # 2     Patient notes s/p I/D right stump wound  Wound vac in place       OBJECTIVE      Physical    VITALS:  /70   Pulse 85   Temp 97.8 °F (36.6 °C) (Oral)   Resp 18   Ht 5' 6\" (1.676 m)   Wt 222 lb (100.7 kg)   SpO2 94%   BMI 35.83 kg/m²   I/O last 3 completed shifts: In: 1110 [P.O.:1100;  I.V.:10]  Out: 1100 [Urine:1100]      Wound vac in place   Minimal ouput  Pain back of the knee       Data  CBC:   Lab Results   Component Value Date    WBC 9.3 04/05/2019    HGB 10.6 04/05/2019     04/05/2019     BMP:    Lab Results   Component Value Date     04/05/2019    K 4.2 04/05/2019    K 4.2 04/03/2019     04/05/2019    CO2 23 04/05/2019    BUN 12 04/05/2019    CREATININE 0.7 04/05/2019    CALCIUM 8.3 04/05/2019    GLUCOSE 183 04/05/2019     Uric Acid:  No components found for: URIC  PT/INR:    Lab Results   Component Value Date    PROTIME 12.2 01/03/2019    INR 1.06 01/03/2019     Troponin:  No results found for: TROPONINI  Urine Culture:  No components found for: CURINE      Current Inpatient Medications    Current Facility-Administered Medications: morphine (PF) injection 2 mg, 2 mg, Intravenous, Q2H PRN **OR** morphine injection 4 mg, 4 mg, Intravenous, Q2H PRN  cyclobenzaprine (FLEXERIL) tablet 10 mg, 10 mg, Oral, TID PRN  insulin glargine (LANTUS) injection vial 50 Units, 50 Units, Subcutaneous, BID  HYDROcodone-acetaminophen (NORCO) 5-325 MG per tablet 1 tablet, 1 tablet, Oral, Q4H PRN **OR** HYDROcodone-acetaminophen (NORCO) 5-325 MG per tablet 2 tablet, 2 tablet, Oral, Q4H PRN  cefTRIAXone (ROCEPHIN) 2 g IVPB in D5W 50ml minibag, 2 g, Intravenous, Q24H  mirtazapine (REMERON) tablet 15 mg, 15 mg, Oral, Nightly  sertraline (ZOLOFT) tablet 150 mg, 150 mg, Oral, Daily  sodium chloride flush 0.9 % injection 10 mL, 10 mL, Intravenous, 2 times per day  sodium chloride flush 0.9 % injection 10 mL, 10 mL, Intravenous, PRN  magnesium hydroxide (MILK OF MAGNESIA) 400 MG/5ML suspension 30 mL, 30 mL, Oral, Daily PRN  ondansetron (ZOFRAN) injection 4 mg, 4 mg, Intravenous, Q6H PRN  [Held by provider] enoxaparin (LOVENOX) injection 40 mg, 40 mg, Subcutaneous, Q24H  glucose (GLUTOSE) 40 % oral gel 15 g, 15 g, Oral, PRN  dextrose 50 % solution 12.5 g, 12.5 g, Intravenous, PRN  glucagon (rDNA) injection 1 mg, 1 mg, Intramuscular, PRN  dextrose 5 % solution, 100 mL/hr, Intravenous, PRN  magnesium sulfate 1 g in dextrose 5 % 100 mL IVPB, 1 g, Intravenous, PRN  potassium chloride (KLOR-CON M) extended release tablet 40 mEq, 40 mEq, Oral, PRN **OR** potassium bicarb-citric acid (EFFER-K) effervescent tablet 40 mEq, 40 mEq, Oral, PRN **OR** potassium chloride 10 mEq/100 mL IVPB (Peripheral Line), 10 mEq, Intravenous, PRN  famotidine (PEPCID) tablet 20 mg, 20 mg, Oral, BID  acetaminophen (TYLENOL) tablet 650 mg, 650 mg, Oral, Q4H PRN  insulin lispro (HUMALOG) injection vial 0-18 Units, 0-18 Units, Subcutaneous, TID WC  insulin lispro (HUMALOG) injection vial 0-9 Units, 0-9 Units, Subcutaneous, Nightly        PLAN    Continue wound vac  Dressing as needed  Ok for discharge from ortho standpoint

## 2019-04-06 NOTE — PROGRESS NOTES
Progress note: Infectious diseases    Patient - Victoriano Woodruff,  Age - 44 y.o.    - 1970      Room Number - 7K-18/018-A   MRN -  [id number]   Acct # - [de-identified]  Date of Admission -  2019  4:11 PM    SUBJECTIVE:   No new complaints. He was started on wound vacuum therapy. OBJECTIVE   VITALS    height is 5' 6\" (1.676 m) and weight is 222 lb (100.7 kg). His oral temperature is 98.2 °F (36.8 °C). His blood pressure is 122/62 and his pulse is 88. His respiration is 18 and oxygen saturation is 99%. Wt Readings from Last 3 Encounters:   19 222 lb (100.7 kg)   19 222 lb (100.7 kg)   19 222 lb 1.6 oz (100.7 kg)       I/O (24 Hours)    Intake/Output Summary (Last 24 hours) at 2019 1454  Last data filed at 2019 1404  Gross per 24 hour   Intake 1290 ml   Output 1650 ml   Net -360 ml       General Appearance  Awake, alert, oriented,  not  In acute distress  HEENT - normocephalic, atraumatic, pink conjunctiva,  anicteric sclera  Neck - Supple, no mass  Lungs -  Bilateral good air entry, no rhonchi, no wheeze  Cardiovascular - Heart sounds are normal.  Regular rate and rhythm without murmur, gallop or rub. Abdomen - soft, not distended, nontender,   Neurologic - oriented  Skin - No bruising or bleeding  Extremities - wound VAC over his right below knee amputation stump.         MEDICATIONS:      insulin glargine  55 Units Subcutaneous BID    cefTRIAXone (ROCEPHIN) IV  2 g Intravenous Q24H    mirtazapine  15 mg Oral Nightly    sertraline  150 mg Oral Daily    sodium chloride flush  10 mL Intravenous 2 times per day    [Held by provider] enoxaparin  40 mg Subcutaneous Q24H    famotidine  20 mg Oral BID    insulin lispro  0-18 Units Subcutaneous TID     insulin lispro  0-9 Units Subcutaneous Nightly      dextrose       morphine **OR** morphine, cyclobenzaprine, HYDROcodone 5 mg - acetaminophen **OR** HYDROcodone 5 mg - acetaminophen, sodium chloride flush, magnesium hydroxide, ondansetron, glucose, dextrose, glucagon (rDNA), dextrose, magnesium sulfate, potassium chloride **OR** potassium alternative oral replacement **OR** potassium chloride, acetaminophen      LABS:     CBC:   Recent Labs     04/04/19  0614 04/05/19  0645   WBC 9.0 9.3   HGB 12.1* 10.6*    265     BMP:    Recent Labs     04/04/19  0614 04/05/19  0645    135   K 4.0 4.2   CL 98 101   CO2 24 23   BUN 11 12   CREATININE 0.7 0.7   GLUCOSE 314* 183*     Calcium:  Recent Labs     04/05/19  0645   CALCIUM 8.3*   Glucose:  Recent Labs     04/05/19  2054 04/06/19  0609 04/06/19  1037   POCGLU 212* 190* 171*       Problem list of patient:     Patient Active Problem List   Diagnosis Code    Status post below knee amputation of right lower [facility] Z89.511    Gait disturbance R26.9    Sebaceous cyst L72.3    Uncontrolled type 2 diabetes mellitus with hyperglycemia, with long-term current use of insulin ([facility]) E11.65, Z79.4    Severe bipolar II disorder, recent episode major depressive, remission ([facility]) F31.81    Bipolar 1 disorder ([facility]) F31.9    Leukocytosis D72.829    Hyponatremia E87.1    Normocytic anemia D64.9    Obesity (BMI 30-39. 9) E66.9    Infection of below knee amputation stump ([facility]) T87.40    History of noncompliance with medical treatment Z91.19    Essential hypertension I10    Diabetic ulcer of right lower leg ([facility]) E11.622, L97.919    Hx of right BKA ([facility]) Z89.511    Tobacco dependence F17.200    History of osteomyelitis Z87.39    Gastroesophageal reflux disease without esophagitis K21.9    History of marijuana use Z87.898    Abscess of right leg L02.415    BKA stump complication ([facility]) D17.5    Physical debility R53.81    Diabetic ulcer of left fifth toe ([facility]) E11.621, L97.529    Cellulitis of fifth toe, left L03.032    Right BKA infection ([facility]) T87.43    Cellulitis of right lower extremity L03.115    Anemia D64.9    Electrolyte imbalance E87.8    DM type 2, uncontrolled, with lower extremity ulcer ([facility]) E11.622, E11.65, L97.909    Weakness R53.1    Deep postoperative wound infection T81.42XA    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    · Continue current IV antibiotics    Donna Miramontes MD, FACP 4/6/2019 2:54 PM

## 2019-04-06 NOTE — PROGRESS NOTES
cefTRIAXone (ROCEPHIN) IV  2 g Intravenous Q24H    mirtazapine  15 mg Oral Nightly    sertraline  150 mg Oral Daily    sodium chloride flush  10 mL Intravenous 2 times per day    [Held by provider] enoxaparin  40 mg Subcutaneous Q24H    famotidine  20 mg Oral BID    insulin lispro  0-18 Units Subcutaneous TID WC    insulin lispro  0-9 Units Subcutaneous Nightly     PRN Meds: morphine **OR** morphine, cyclobenzaprine, HYDROcodone 5 mg - acetaminophen **OR** HYDROcodone 5 mg - acetaminophen, sodium chloride flush, magnesium hydroxide, ondansetron, glucose, dextrose, glucagon (rDNA), dextrose, magnesium sulfate, potassium chloride **OR** potassium alternative oral replacement **OR** potassium chloride, acetaminophen    Ins and outs:      Intake/Output Summary (Last 24 hours) at 4/6/2019 0829  Last data filed at 4/6/2019 7202  Gross per 24 hour   Intake 1110 ml   Output 1100 ml   Net 10 ml       Physical Examination     /70   Pulse 85   Temp 97.8 °F (36.6 °C) (Oral)   Resp 18   Ht 5' 6\" (1.676 m)   Wt 222 lb (100.7 kg)   SpO2 94%   BMI 35.83 kg/m²     General appearance: No apparent distress. HEENT: Extraocular motion intact. Neck: Supple  Respiratory: Decreased breath sounds at bilateral lung bases. Cardiovascular: RRR with normal S1/S2 without murmurs, rubs or gallops. Abdomen: Soft, non-tender, non-distended with normal bowel sounds. Musculoskeletal: Right BKA. Right lower extremity in dressing. Interval placement of wound vac  Neurologic: Grossly non focal. CN: II-XII intact  Psychiatric: Alert and oriented  Vascular: Radial pulses palpable bilaterally. Skin:  No visible rashes or lesions.     Labs     Recent Labs     04/04/19  0614 04/05/19  0645   WBC 9.0 9.3   HGB 12.1* 10.6*   HCT 35.7* 31.5*    265     Recent Labs     04/04/19  0614 04/05/19  0645    135   K 4.0 4.2   CL 98 101   CO2 24 23   BUN 11 12   CREATININE 0.7 0.7   CALCIUM 8.7 8.3*     No results for input(s): AST, ALT, BILIDIR, BILITOT, ALKPHOS in the last 72 hours. No results for input(s): INR in the last 72 hours. No results for input(s): [place] in the last 72 hours. Urinalysis:      Lab Results   Component Value Date    NITRU NEGATIVE 02/15/2019    WBCUA 0-2 02/15/2019    BACTERIA NONE 02/15/2019    RBCUA 0-2 02/15/2019    BLOODU SMALL 02/15/2019    SPECGRAV >1.030 10/17/2018    GLUCOSEU >= 1000 02/15/2019       Diagnostic imaging/procedures       Ct Tibia Fibula Right W Contrast    Result Date: 4/1/2019  PROCEDURE: NONCONTRAST CT RIGHT LOWER EXTREMITY: CLINICAL INFORMATION: Prior below-the-knee interpretation. Recurrent cellulitis at the amputation site. TECHNIQUE: Multiple axial 3 mm images of the right lower extremity (with attention to the stomach) were obtained following the administration of intravenous contrast material. Computer generated sagittal and coronal images of the right lower extremity were also reconstructed. ALL CT SCANS AT THIS FACILITY use dose modulation, iterative reconstruction, and/or weight-based dosing when appropriate to reduce radiation dose to as low as reasonably achievable. FINDINGS: There is an open wound along the lateral aspect of the fibular head which was not \"on the prior study from February 15, suggesting possible wound dehiscence. There is persistent soft tissue swelling along the lateral aspect of the knee and extending into the stomach, as on the prior study, consistent with cellulitis. No definite drainable fluid collection is seen. No bone destruction is seen. The previous noted subcutaneous emphysema on the prior study has resolved. .     1. No evidence for osteomyelitis. Persistent findings of cellulitis involving the stump. No abscess or drainable fluid collection is seen. 2. Note that there is a deep wound along the lateral aspect of the fibular head, which was not present previously, suggesting possible wound dehiscence.  Clinical correlation recommended. **This report has been created using voice recognition software. It may contain minor errors which are inherent in voice recognition technology. ** Final report electronically signed by Dr. Kwabena Cruz on 4/1/2019 2:36 PM    Mri Tibula Fibula Right Wo Contrast    Result Date: 4/3/2019  PROCEDURE: MRI TIBIA FIBULA RIGHT WO CONTRAST CLINICAL INFORMATION: INFECTION, open wound, erythema and swelling on the BKA stump COMPARISON: No prior study. TECHNIQUE: Coronal T1 and STIR bilateral lower legs, axial T1, FST2 and STIR lower leg and sagittal T1 and STIR lower leg. FINDINGS: ALIGNMENT: Anatomic. MARROW/MARROW EDEMA/FRACTURE: Moderate marrow edema within the residual fibula suspicious for osteomyelitis. No definite tibial marrow edema. OSSEOUS DESTRUCTION/PERIOSTITIS: Small area of cortical destruction within the fibular stump. KNEE JOINTS: Moderate joint effusion at the knee. TENDONS: Unremarkable. MUSCULATURE: Marked muscular edema involving the anterolateral muscle group of the lower leg. There is severe such change in the lateral gastrocnemius. NEUROVASCULAR: Unremarkable. SOFT TISSUES: There is a large soft tissue ulcer laterally near the stump of the fibula. Marked soft tissue inflammation is seen throughout this region. Myositis and deep cellulitis are favored. There are a few areas of susceptibility artifact suspicious  for gas-forming infection. This extends down to the fibular stump. It abuts the tibial stump laterally without definite tibial marrow edema. OTHER: None. Moderate osteomyelitis within the residual fibula adjacent to an ulcer and marked adjacent cellulitis and myositis. **This report has been created using voice recognition software. It may contain minor errors which are inherent in voice recognition technology. ** Final report electronically signed by Dr. Alphonse Clark on 4/3/2019 5:07 PM      DVT prophylaxis: ? Lovenox                                 ?  SCDs ? SQ Heparin                                 ? Encourage ambulation           ? Already on Anticoagulation     Disposition:    ? Home?       ? TCU       ? Rehab       ? Psych       ? SNF       ? [place]? [place]?        ? Other-

## 2019-04-06 NOTE — PLAN OF CARE
unbearable will improve  4/6/2019 1025 by Pratima Hare RN  Outcome: Ongoing  Note:   Patient did not call out for pain medications, when writer went into room and questioned patient he stated his pain was a 10/10.   4/6/2019 0229 by Tramaine Hilton RN  Outcome: Ongoing  Note:   Pt notifies staff when in need of pain meds  4/6/2019 0148 by Tramaine Hilton RN  Outcome: Ongoing     Problem: Serum Glucose Level - Abnormal:  Goal: Ability to maintain appropriate glucose levels will improve to within specified parameters  Description  Ability to maintain appropriate glucose levels will improve to within specified parameters  4/6/2019 1025 by Pratima Hare RN  Outcome: Ongoing  Note:   Patient's morning blood glucose was 190.   4/6/2019 0229 by Tramaine Hilton RN  Outcome: Ongoing  Note:   Pt receiving insulin when needed for high blood sugars  4/6/2019 0148 by Tramaine Hilton RN  Outcome: Ongoing     Problem: Skin Integrity - Impaired:  Goal: Absence of new skin breakdown  Description  Absence of new skin breakdown  4/6/2019 1025 by Pratima Hare RN  Outcome: Ongoing  Note:   Patient's skin is appropriate for ethnicity, warm, and dry, with no new skin issues noted this shift. Mucous membranes are pink, moist, and intact. 4/6/2019 0229 by Tramaine Hilton RN  Outcome: Ongoing  Note:   Pt has no skin breakdown  4/6/2019 0148 by Tramaine Hilton RN  Outcome: Ongoing     Problem: Infection:  Goal: Will remain free from infection  Description  Will remain free from infection  4/6/2019 1025 by Pratima Hare RN  Outcome: Ongoing  Note:   Patient denies fever / chills, VS remain WNL. 4/6/2019 0229 by Tramaine Hilton RN  Outcome: Ongoing  Note:   Pt has been afebrile.   4/6/2019 0148 by Tramaine Hilton RN  Outcome: Ongoing     Problem: Daily Care:  Goal: Daily care needs are met  Description  Daily care needs are met  4/6/2019 1025 by Pratima Hare RN  Outcome: Ongoing  Note:   Patient's daily care needs continue to be met. 4/6/2019 0229 by Tita Brennan RN  Outcome: Ongoing  Note:   Pt needs minimal assistance with ADLs  4/6/2019 0148 by Tita Brennan RN  Outcome: Ongoing     Problem: Falls - Risk of:  Goal: Will remain free from falls  Description  Will remain free from falls  4/6/2019 1025 by Colt Sy RN  Outcome: Ongoing  Note:   No falls noted this shift, patient uses call light appropriately and waits for staff prior to transferring self. Patient able to voice needs appropriately. Gait is steady with staff assistance. Patient is limited assistance with most ADLs. 4/6/2019 0229 by Tita Brennan RN  Outcome: Ongoing  Note:   No falls this shift. Pt using call light appropriately to call for assistance with ambulation to the bathroom and to chair. Pt is also compliant with use of non-skid slippers. Pt reports understanding of fall prevention when discussed. 4/6/2019 0148 by Tita Brennan RN  Outcome: Ongoing  Goal: Absence of physical injury  Description  Absence of physical injury  4/6/2019 1025 by Colt Sy RN  Outcome: Ongoing  Note:   No new physical injury noted this shift so far. 4/6/2019 0229 by Tita Brennan RN  Outcome: Ongoing  Note:   Pt free from injury this shift  4/6/2019 0148 by Tita Brennan RN  Outcome: Ongoing     Problem: DISCHARGE BARRIERS  Goal: Patient's continuum of care needs are met  4/6/2019 1025 by Colt Sy RN  Outcome: Ongoing  Note:   Patient's continuum of care needs continue to be met. 4/6/2019 0229 by Tita Brennan RN  Outcome: Ongoing  Note:   Discharge planning in process, may go to [place]  4/6/2019 0148 by Tita Brennan RN  Outcome: Ongoing     Problem: Nutrition  Goal: Optimal nutrition therapy  4/6/2019 1025 by Colt Sy RN  Outcome: Ongoing  Note:   Patient verbalizes appropriate appetite, denies n/v. Able to consume food and drink with no issues noted. Patient is on a carb control diet.      4/6/2019 0229 by

## 2019-04-06 NOTE — PLAN OF CARE
Problem: Pain Control  Goal: Maintain pain level at or below patient's acceptable level (or 5 if patient is unable to determine acceptable level)  Outcome: Ongoing  Flowsheets (Taken 4/6/2019 0021)  Patient's Stated Pain Goal: 4  Note:   Pt's rt BKA has been 0-7/10 on pain scale. Pt pain goal is 4. Pt tolerating PO pain meds and getting periods of rest    Care plan reviewed with patient. Patient verbalize understanding of the plan of care and contribute to goal setting.

## 2019-04-07 LAB
AEROBIC CULTURE: ABNORMAL
ANAEROBIC CULTURE: ABNORMAL
BLOOD CULTURE, ROUTINE: NORMAL
BLOOD CULTURE, ROUTINE: NORMAL
GLUCOSE BLD-MCNC: 151 MG/DL (ref 70–108)
GLUCOSE BLD-MCNC: 224 MG/DL (ref 70–108)
GLUCOSE BLD-MCNC: 233 MG/DL (ref 70–108)
GLUCOSE BLD-MCNC: 306 MG/DL (ref 70–108)
GRAM STAIN RESULT: ABNORMAL
ORGANISM: ABNORMAL

## 2019-04-07 PROCEDURE — 6360000002 HC RX W HCPCS: Performed by: NURSE PRACTITIONER

## 2019-04-07 PROCEDURE — 2580000003 HC RX 258: Performed by: NURSE PRACTITIONER

## 2019-04-07 PROCEDURE — 82948 REAGENT STRIP/BLOOD GLUCOSE: CPT

## 2019-04-07 PROCEDURE — 6370000000 HC RX 637 (ALT 250 FOR IP): Performed by: NURSE PRACTITIONER

## 2019-04-07 PROCEDURE — 1200000000 HC SEMI PRIVATE

## 2019-04-07 PROCEDURE — 6370000000 HC RX 637 (ALT 250 FOR IP): Performed by: INTERNAL MEDICINE

## 2019-04-07 PROCEDURE — 99232 SBSQ HOSP IP/OBS MODERATE 35: CPT | Performed by: INTERNAL MEDICINE

## 2019-04-07 RX ADMIN — INSULIN LISPRO 12 UNITS: 100 INJECTION, SOLUTION INTRAVENOUS; SUBCUTANEOUS at 16:13

## 2019-04-07 RX ADMIN — MIRTAZAPINE 15 MG: 15 TABLET, FILM COATED ORAL at 20:22

## 2019-04-07 RX ADMIN — Medication 10 ML: at 11:06

## 2019-04-07 RX ADMIN — CEFTRIAXONE SODIUM 2 G: 2 INJECTION, POWDER, FOR SOLUTION INTRAMUSCULAR; INTRAVENOUS at 21:33

## 2019-04-07 RX ADMIN — MORPHINE SULFATE 2 MG: 2 INJECTION, SOLUTION INTRAMUSCULAR; INTRAVENOUS at 14:16

## 2019-04-07 RX ADMIN — FAMOTIDINE 20 MG: 20 TABLET ORAL at 07:52

## 2019-04-07 RX ADMIN — INSULIN GLARGINE 55 UNITS: 100 INJECTION, SOLUTION SUBCUTANEOUS at 20:22

## 2019-04-07 RX ADMIN — INSULIN GLARGINE 55 UNITS: 100 INJECTION, SOLUTION SUBCUTANEOUS at 07:52

## 2019-04-07 RX ADMIN — SERTRALINE 150 MG: 100 TABLET, FILM COATED ORAL at 07:52

## 2019-04-07 RX ADMIN — Medication 10 ML: at 19:28

## 2019-04-07 RX ADMIN — HYDROCODONE BITARTRATE AND ACETAMINOPHEN 2 TABLET: 5; 325 TABLET ORAL at 11:24

## 2019-04-07 RX ADMIN — HYDROCODONE BITARTRATE AND ACETAMINOPHEN 2 TABLET: 5; 325 TABLET ORAL at 19:28

## 2019-04-07 RX ADMIN — FAMOTIDINE 20 MG: 20 TABLET ORAL at 20:21

## 2019-04-07 RX ADMIN — INSULIN LISPRO 3 UNITS: 100 INJECTION, SOLUTION INTRAVENOUS; SUBCUTANEOUS at 07:52

## 2019-04-07 RX ADMIN — INSULIN LISPRO 3 UNITS: 100 INJECTION, SOLUTION INTRAVENOUS; SUBCUTANEOUS at 20:21

## 2019-04-07 RX ADMIN — INSULIN LISPRO 6 UNITS: 100 INJECTION, SOLUTION INTRAVENOUS; SUBCUTANEOUS at 11:07

## 2019-04-07 ASSESSMENT — PAIN DESCRIPTION - DESCRIPTORS
DESCRIPTORS: THROBBING
DESCRIPTORS: SHARP

## 2019-04-07 ASSESSMENT — PAIN SCALES - GENERAL
PAINLEVEL_OUTOF10: 8
PAINLEVEL_OUTOF10: 0
PAINLEVEL_OUTOF10: 0
PAINLEVEL_OUTOF10: 7
PAINLEVEL_OUTOF10: 1
PAINLEVEL_OUTOF10: 5
PAINLEVEL_OUTOF10: 8

## 2019-04-07 ASSESSMENT — PAIN DESCRIPTION - ORIENTATION: ORIENTATION: RIGHT

## 2019-04-07 ASSESSMENT — PAIN - FUNCTIONAL ASSESSMENT
PAIN_FUNCTIONAL_ASSESSMENT: ACTIVITIES ARE NOT PREVENTED
PAIN_FUNCTIONAL_ASSESSMENT: 0-10

## 2019-04-07 ASSESSMENT — PAIN DESCRIPTION - DIRECTION: RADIATING_TOWARDS: POSTERIOR

## 2019-04-07 ASSESSMENT — PAIN DESCRIPTION - LOCATION: LOCATION: LEG

## 2019-04-07 ASSESSMENT — PAIN DESCRIPTION - FREQUENCY: FREQUENCY: CONTINUOUS

## 2019-04-07 ASSESSMENT — PAIN DESCRIPTION - PAIN TYPE: TYPE: SURGICAL PAIN

## 2019-04-07 ASSESSMENT — PAIN DESCRIPTION - ONSET: ONSET: ON-GOING

## 2019-04-07 ASSESSMENT — PAIN DESCRIPTION - PROGRESSION: CLINICAL_PROGRESSION: NOT CHANGED

## 2019-04-07 NOTE — PLAN OF CARE
Problem: Skin Integrity - Impaired:  Goal: Absence of new skin breakdown  Description  Absence of new skin breakdown  4/6/2019 2344 by Patrica Alan RN  Outcome: Met This Shift  Note:   No skin breakdown noted to patient this shift. Patient is able to reposition himself in bed in order to prevent skin breakdown. Problem: Infection:  Goal: Will remain free from infection  Description  Will remain free from infection  4/6/2019 2344 by Patrica Alan RN  Outcome: Met This Shift  Note:   No signs or symptoms of infection noted to patient this shift. Patient afebrile and vitals stable. Dressing over surgical incision is dry and intact without any drainage noted. Will monitor. Problem: Falls - Risk of:  Goal: Will remain free from falls  Description  Will remain free from falls  4/6/2019 2344 by Patrica Alan RN  Outcome: Met This Shift  Note:   Patient has not had any falls during this shift. Bed in lowest position with wheels locked and call light in reach. Patient compliant with using call light to request assistance from staff when needed. Fall prevention measures in place and patient compliant. Hourly rounding in place and bed alarm on. Problem: Nutrition  Goal: Optimal nutrition therapy  4/6/2019 2344 by Patrica Alan RN  Outcome: Met This Shift  Note:   Patient on carb control diet and tolerating well. No nausea/vomiting noted to patient this shift. Problem: Discharge Planning:  Goal: Discharged to appropriate level of care  Description  Discharged to appropriate level of care  4/6/2019 2344 by Patrica Alan RN  Outcome: Ongoing  Note:   Patient planning discharge to [place], possible Monday.  assisting patient with discharge needs. Problem:  Activity Intolerance:  Goal: Ability to tolerate increased activity will improve  Description  Ability to tolerate increased activity will improve  4/6/2019 2344 by Patrica Alan RN  Outcome: Ongoing  Note:   Patient up to wheelchair himself and tolerating well. Problem: Serum Glucose Level - Abnormal:  Goal: Ability to maintain appropriate glucose levels will improve to within specified parameters  Description  Ability to maintain appropriate glucose levels will improve to within specified parameters  4/6/2019 2344 by Ervin Goodman RN  Outcome: Ongoing  Note:   Blood sugar tonight 276. [place] ACHS and sliding scale as appropriate. Problem: Daily Care:  Goal: Daily care needs are met  Description  Daily care needs are met  4/6/2019 2344 by Ervin Goodman RN  Outcome: Ongoing  Note:   Patient requires assistance from staff with ADLs. Patient is able to use call light to request assistance when needed. Will continue to address patient's needs as they arise. Problem: Musculor/Skeletal Functional Status  Goal: Highest potential functional level  Outcome: Ongoing  Note:   Patient up to wheelchair and tolerating well. Problem: Pain Control  Goal: Maintain pain level at or below patient's acceptable level (or 5 if patient is unable to determine acceptable level)  4/6/2019 2344 by Ervin Goodman RN  Outcome: Ongoing  Flowsheets (Taken 4/6/2019 2344)  Patient's Stated Pain Goal: 4  Note:   Patient with stated pain goal of 4/10. Patient verbalizing pain to right BKA this shift. Patient taking norco every four hours PRN for pain and voices relief of pain. Periods of rest noted to patient after administering pain medications. Right leg elevated for comfort. Will continue to assess patient for pain and administer pain medication as appropriate. Problem: Discharge Planning:  Goal: Participates in care planning  Description  Participates in care planning  4/6/2019 2344 by Ervin Goodman RN  Outcome: Completed  Note:   Patient is able to participate in care planning.       Problem: Anxiety/Stress:  Goal: Level of anxiety will decrease  Description  Level of anxiety will decrease  4/6/2019 2344 by Ervin Goodman RN  Outcome: Completed  Note:   No anxiety noted to patient this shift. Problem: Falls - Risk of:  Goal: Absence of physical injury  Description  Absence of physical injury  4/6/2019 2344 by Trevor Gauthier RN  Outcome: Completed  Note:   No accidental physical injury to patient this shift. Bed in lowest position with wheels locked and call light in reach. Problem: DISCHARGE BARRIERS  Goal: Patient's continuum of care needs are met  4/6/2019 2344 by Trevor Gauthier RN  Outcome: Completed  Note:   Repeat      Care plan reviewed with patient. Patient verbalizes understanding of plan of care and contributes to goal setting.

## 2019-04-07 NOTE — PROGRESS NOTES
Hospitalist Progress Note    Patient:  Lakshmi Pickard  YOB: 1970  MRN: [id number]   PCP: No primary care provider on file. Acct: [de-identified]  Unit/Bed: 7K-18/018-A    Date of Admission: 4/2/2019      ASSESSMENT     1. Strep agalactiae diabetic ulcer, cellulitis/myositis, acute on chronic osteomyelitis of the fibula of right lower extremity at BKA S/P I&d on 4/4  1. Seen on MRI on 4/3  2. Being followed by Dr. María Mattson. Appreciate recommendations  3. Previous cxs on 4/1 show Group B strep, new cx from 4/4 growing Strep agalactiae  4. Was on  aztreonam and Vancomycin and antibiotic changed to Ceftriaxone on 4/3  5. Had I&D with Orthopedic Surgery on 4/4.   6. Wound care- Wound vac placed on 4/5  7. Per Dr. María Mattson, extended care will best be performed at [place] or [facility]. Patient does not want to go to [facility],  [place] is being considered. Otherwise patient wants to go home with IV antibiotic administration. Rehab is also a consideration  2. Hyponatremia  1. Improved  3. Uncontrolled DM Type 2, HgbA1C 9.1  1. On Lantus 55 units bid,  4. Chronic co-morbidities:  1. Essential HTN  2. Bipolar disorder  3. History of R BKA    PLAN     1. Plan to discharge to [place] if possible. Case is currently being reviewed. Otherwise 7E may be considered. 2. Continue Ceftriaxone per Dr. Mraía Mattson  3. Continue to monitor BG      Anticipated Discharge in :  1 to 2 days    Code Status: Full Code    Electronically signed by Goldie Michael MD on 4/7/2019 at 3:50 PM      Chief Complaint     Right BKA  Infection      SUBJECTIVE     The patient is a 44 y.o. [place] Nishi yo male who was admitted on 4/2/2019 for increased erythema, swelling and pain at right BKA for 3 days. Previous cxs grew Group B Strep    - Patient stable.  Reports pain 8/10 at right lower extremity- Wound vac tubing got displaced and had to be replaced     OBJECTIVE     Medications:  Reviewed    Infusion Medications    dextrose       Scheduled Medications    [START ON 4/8/2019] enoxaparin  40 mg Subcutaneous Q24H    insulin glargine  55 Units Subcutaneous BID    cefTRIAXone (ROCEPHIN) IV  2 g Intravenous Q24H    mirtazapine  15 mg Oral Nightly    sertraline  150 mg Oral Daily    sodium chloride flush  10 mL Intravenous 2 times per day    famotidine  20 mg Oral BID    insulin lispro  0-18 Units Subcutaneous TID WC    insulin lispro  0-9 Units Subcutaneous Nightly     PRN Meds: morphine **OR** morphine, cyclobenzaprine, HYDROcodone 5 mg - acetaminophen **OR** HYDROcodone 5 mg - acetaminophen, sodium chloride flush, magnesium hydroxide, ondansetron, glucose, dextrose, glucagon (rDNA), dextrose, magnesium sulfate, potassium chloride **OR** potassium alternative oral replacement **OR** potassium chloride, acetaminophen    Ins and outs:      Intake/Output Summary (Last 24 hours) at 4/7/2019 1550  Last data filed at 4/7/2019 1502  Gross per 24 hour   Intake 1711.62 ml   Output 2525 ml   Net -813.38 ml       Physical Examination     /65   Pulse 96   Temp 98.8 °F (37.1 °C) (Oral)   Resp 18   Ht 5' 6\" (1.676 m)   Wt 222 lb (100.7 kg)   SpO2 100%   BMI 35.83 kg/m²     General appearance: No apparent distress. HEENT: Extraocular motion intact. Neck: Supple  Respiratory: Decreased breath sounds at bilateral lung bases. Cardiovascular: RRR with normal S1/S2 without murmurs, rubs or gallops. Abdomen: Soft, non-tender, non-distended with normal bowel sounds. Musculoskeletal: Right BKA. Erythema and swelling at right lower extremity stump improved. Wound vac in place with brownish drainage  Neurologic: Grossly non focal. CN: II-XII intact  Psychiatric: Alert and oriented  Vascular: Radial pulses palpable bilaterally. Skin:  No visible rashes or lesions.     Labs     Recent Labs     04/05/19  0645   WBC 9.3   HGB 10.6*   HCT 31.5*        Recent Labs     04/05/19  0645      K 4.2      CO2 23   BUN 12   CREATININE 0.7   CALCIUM 8.3*     No results for input(s): AST, ALT, BILIDIR, BILITOT, ALKPHOS in the last 72 hours. No results for input(s): INR in the last 72 hours. No results for input(s): Evern Eros in the last 72 hours. Urinalysis:      Lab Results   Component Value Date    NITRU NEGATIVE 02/15/2019    WBCUA 0-2 02/15/2019    BACTERIA NONE 02/15/2019    RBCUA 0-2 02/15/2019    BLOODU SMALL 02/15/2019    SPECGRAV >1.030 10/17/2018    GLUCOSEU >= 1000 02/15/2019       Diagnostic imaging/procedures       Ct Tibia Fibula Right W Contrast    Result Date: 4/1/2019  PROCEDURE: NONCONTRAST CT RIGHT LOWER EXTREMITY: CLINICAL INFORMATION: Prior below-the-knee interpretation. Recurrent cellulitis at the amputation site. TECHNIQUE: Multiple axial 3 mm images of the right lower extremity (with attention to the stomach) were obtained following the administration of intravenous contrast material. Computer generated sagittal and coronal images of the right lower extremity were also reconstructed. ALL CT SCANS AT THIS FACILITY use dose modulation, iterative reconstruction, and/or weight-based dosing when appropriate to reduce radiation dose to as low as reasonably achievable. FINDINGS: There is an open wound along the lateral aspect of the fibular head which was not \"on the prior study from February 15, suggesting possible wound dehiscence. There is persistent soft tissue swelling along the lateral aspect of the knee and extending into the stomach, as on the prior study, consistent with cellulitis. No definite drainable fluid collection is seen. No bone destruction is seen. The previous noted subcutaneous emphysema on the prior study has resolved. .     1. No evidence for osteomyelitis. Persistent findings of cellulitis involving the stump. No abscess or drainable fluid collection is seen. 2. Note that there is a deep wound along the lateral aspect of the fibular head, which was not present previously, suggesting possible wound dehiscence.  Clinical correlation recommended. **This report has been created using voice recognition software. It may contain minor errors which are inherent in voice recognition technology. ** Final report electronically signed by Dr. Indra Hodges on 4/1/2019 2:36 PM    Mri Tibula Fibula Right Wo Contrast    Result Date: 4/3/2019  PROCEDURE: MRI TIBIA FIBULA RIGHT WO CONTRAST CLINICAL INFORMATION: INFECTION, open wound, erythema and swelling on the BKA stump COMPARISON: No prior study. TECHNIQUE: Coronal T1 and STIR bilateral lower legs, axial T1, FST2 and STIR lower leg and sagittal T1 and STIR lower leg. FINDINGS: ALIGNMENT: Anatomic. MARROW/MARROW EDEMA/FRACTURE: Moderate marrow edema within the residual fibula suspicious for osteomyelitis. No definite tibial marrow edema. OSSEOUS DESTRUCTION/PERIOSTITIS: Small area of cortical destruction within the fibular stump. KNEE JOINTS: Moderate joint effusion at the knee. TENDONS: Unremarkable. MUSCULATURE: Marked muscular edema involving the anterolateral muscle group of the lower leg. There is severe such change in the lateral gastrocnemius. NEUROVASCULAR: Unremarkable. SOFT TISSUES: There is a large soft tissue ulcer laterally near the stump of the fibula. Marked soft tissue inflammation is seen throughout this region. Myositis and deep cellulitis are favored. There are a few areas of susceptibility artifact suspicious  for gas-forming infection. This extends down to the fibular stump. It abuts the tibial stump laterally without definite tibial marrow edema. OTHER: None. Moderate osteomyelitis within the residual fibula adjacent to an ulcer and marked adjacent cellulitis and myositis. **This report has been created using voice recognition software. It may contain minor errors which are inherent in voice recognition technology. ** Final report electronically signed by Dr. Myla Fleming on 4/3/2019 5:07 PM      DVT prophylaxis: ? Lovenox                                 ?  SCDs ? SQ Heparin                                 ? Encourage ambulation           ? Already on Anticoagulation     Disposition:    ? Home with IV antibiotics? ? TCU       ? Rehab       ? Psych       ? SNF       ? [place]? [place]?  Rehab?       ? Other-

## 2019-04-07 NOTE — PLAN OF CARE
Problem: Discharge Planning:  Goal: Discharged to appropriate level of care  Description  Discharged to appropriate level of care  4/7/2019 0941 by Elaine Morejon RN  Outcome: Ongoing  Note:   Patient's discharge planning continues, patient plans of being discharged to [facility]. Needs ATB plan. 4/6/2019 2344 by Ken Lucas RN  Outcome: Ongoing  Note:   Patient planning discharge to [facility], possible Monday.  assisting patient with discharge needs. Problem: Activity Intolerance:  Goal: Ability to tolerate increased activity will improve  Description  Ability to tolerate increased activity will improve  4/7/2019 0941 by Elaine Morejon RN  Outcome: Ongoing  Note:   No falls noted this shift, patient uses call light appropriately and waits for staff prior to transferring self. Patient able to voice needs appropriately. Transfers self with staff supervision. Patient is limited assistance with most ADLs. 4/6/2019 2344 by Ken Lucas RN  Outcome: Ongoing  Note:   Patient up to wheelchair himself and tolerating well. Problem: Serum Glucose Level - Abnormal:  Goal: Ability to maintain appropriate glucose levels will improve to within specified parameters  Description  Ability to maintain appropriate glucose levels will improve to within specified parameters  4/7/2019 0941 by Elaine Morejon RN  Outcome: Ongoing  Note:   Blood sugar remains under 200.   4/6/2019 2344 by Ken Lucas RN  Outcome: Ongoing  Note:   Blood sugar tonight 276. [place] ACHS and sliding scale as appropriate. Problem: Skin Integrity - Impaired:  Goal: Absence of new skin breakdown  Description  Absence of new skin breakdown  4/7/2019 0941 by Elaine Morejon RN  Outcome: Ongoing  Note:   Patient's skin is appropriate for ethnicity, warm, and dry, with no new skin issues noted this shift. Mucous membranes are pink, moist, and intact.     4/6/2019 2344 by Ken Lucas RN  Outcome: Met This potential functional level  4/7/2019 0941 by Hunter Mcclendon RN  Outcome: Ongoing  Note:   No falls noted this shift, patient uses call light appropriately and waits for staff prior to transferring self. Patient able to voice needs appropriately. Transfers self with staff supervision. Patient is limited assistance with most ADLs. 4/6/2019 2344 by Kaitlin Fragoso RN  Outcome: Ongoing  Note:   Patient up to wheelchair and tolerating well. Problem: Nutrition  Goal: Optimal nutrition therapy  4/7/2019 0941 by Hunter Mcclendon RN  Outcome: Ongoing  Note:   Patient verbalizes appropriate appetite, denies n/v. Able to consume food and drink with no issues noted. Patient is on a carb control diet. 4/6/2019 2344 by Kaitlin Fragoso RN  Outcome: Met This Shift  Note:   Patient on carb control diet and tolerating well. No nausea/vomiting noted to patient this shift. Problem: Pain Control  Goal: Maintain pain level at or below patient's acceptable level (or 5 if patient is unable to determine acceptable level)  4/7/2019 0941 by Hunter Mcclendon RN  Outcome: Ongoing  Flowsheets (Taken 4/7/2019 0800)  Patient's Stated Pain Goal: 4  Note:   Patient denies pain so far this shift. 4/6/2019 2344 by Kaitlin Fragoso RN  Outcome: Ongoing  Flowsheets (Taken 4/6/2019 2344)  Patient's Stated Pain Goal: 4  Note:   Patient with stated pain goal of 4/10. Patient verbalizing pain to right BKA this shift. Patient taking norco every four hours PRN for pain and voices relief of pain. Periods of rest noted to patient after administering pain medications. Right leg elevated for comfort. Will continue to assess patient for pain and administer pain medication as appropriate. Care plan reviewed with patient. Patient does verbalize understanding of the plan of care and contribute to goal setting.

## 2019-04-07 NOTE — PROGRESS NOTES
Progress note: Infectious diseases    Patient - Frances Mabry,  Age - 44 y.o.    - 1970      Room Number - 7K-18/018-A   MRN -  [id number]   Acct # - [de-identified]  Date of Admission -  2019  4:11 PM    SUBJECTIVE:   No new complaints. OBJECTIVE   VITALS    height is 5' 6\" (1.676 m) and weight is 222 lb (100.7 kg). His oral temperature is 98.4 °F (36.9 °C). His blood pressure is 114/73 and his pulse is 90. His respiration is 18 and oxygen saturation is 98%. Wt Readings from Last 3 Encounters:   19 222 lb (100.7 kg)   19 222 lb (100.7 kg)   19 222 lb 1.6 oz (100.7 kg)       I/O (24 Hours)    Intake/Output Summary (Last 24 hours) at 2019 1201  Last data filed at 2019 1106  Gross per 24 hour   Intake 1394.62 ml   Output 925 ml   Net 469.62 ml       General Appearance  Awake, alert, oriented,  not  In acute distress  HEENT - normocephalic, atraumatic, pink conjunctiva,  anicteric sclera  Neck - Supple, no mass  Lungs -  Bilateral good air entry, no rhonchi, no wheeze  Cardiovascular - Heart sounds are normal.  Regular rate and rhythm without murmur, gallop or rub. Abdomen - soft, not distended, nontender,   Neurologic - oriented  Skin - No bruising or bleeding  Extremities - wound VAC over his right below knee amputation stump.          MEDICATIONS:      insulin glargine  55 Units Subcutaneous BID    cefTRIAXone (ROCEPHIN) IV  2 g Intravenous Q24H    mirtazapine  15 mg Oral Nightly    sertraline  150 mg Oral Daily    sodium chloride flush  10 mL Intravenous 2 times per day    [Held by provider] enoxaparin  40 mg Subcutaneous Q24H    famotidine  20 mg Oral BID    insulin lispro  0-18 Units Subcutaneous TID     insulin lispro  0-9 Units Subcutaneous Nightly      dextrose       morphine **OR** morphine, cyclobenzaprine, HYDROcodone 5 mg - acetaminophen **OR** HYDROcodone 5 mg - acetaminophen, sodium chloride flush, magnesium hydroxide, ondansetron, glucose, dextrose, glucagon (rDNA), dextrose, magnesium sulfate, potassium chloride **OR** potassium alternative oral replacement **OR** potassium chloride, acetaminophen      LABS:     CBC:   Recent Labs     04/05/19  0645   WBC 9.3   HGB 10.6*        BMP:    Recent Labs     04/05/19  0645      K 4.2      CO2 23   BUN 12   CREATININE 0.7   GLUCOSE 183*     Calcium:  Recent Labs     04/05/19  0645   CALCIUM 8.3*   Glucose:  Recent Labs     04/06/19  2048 04/07/19  0625 04/07/19  1103   POCGLU 276* 151* 233*       Problem list of patient:     Patient Active Problem List   Diagnosis Code    Status post below knee amputation of right lower extremity [facility] Z89.511    Gait disturbance R26.9    Sebaceous cyst L72.3    Uncontrolled type 2 diabetes mellitus with hyperglycemia, with long-term current use of insulin ([facility]) E11.65, Z79.4    Severe bipolar II disorder, recent episode major depressive, remission ([facility]) F31.81    Bipolar 1 disorder ([facility]) F31.9    Leukocytosis D72.829    Hyponatremia E87.1    Normocytic anemia D64.9    Obesity (BMI 30-39. 9) E66.9    Infection of below knee amputation stump ([facility]) T87.40    History of noncompliance with medical treatment Z91.19    Essential hypertension I10    Diabetic ulcer of right lower leg ([facility]) E11.622, L97.919    Hx of right BKA ([facility]) Z89.511    Tobacco dependence F17.200    History of osteomyelitis Z87.39    Gastroesophageal reflux disease without esophagitis K21.9    History of marijuana use Z87.898    Abscess of right leg L02.415    BKA stump complication ([facility]) W68.9    Physical debility R53.81    Diabetic ulcer of left fifth toe ([facility]) E11.621, L97.529    Cellulitis of fifth toe, left L03.032    Right BKA infection ([facility]) T87.43    Cellulitis of right lower extremity L03.115    Anemia D64.9    Electrolyte imbalance E87.8    DM type 2, uncontrolled, with lower extremity ulcer ([facility]) E11.622, E11.65, L97.909    Weakness R53.1    Deep postoperative wound infection T81.42XA    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  (7E if he qualifies  · Continue current IV antibiotics: arrange picc line at am  ·     Sami Sheppard MD, FACP 4/7/2019 12:01 PM

## 2019-04-07 NOTE — FLOWSHEET NOTE
04/07/19 1503   Negative Pressure Wound Therapy Leg Right;Lateral   Placement Date/Time: 04/05/19 1400   Location: Leg  Wound Location Orientation: Right;Lateral   Wound Type Surgical   Dressing Type Black foam   Cycle Continuous   Target Pressure (mmHg) 125   Canister changed? Yes   Dressing Status Clean;Dry; Intact   Wound Assessment CODY   Louise-wound Assessment CODY       Canister changed, total amount of drainage in old canister was 700 mL sero-sang. Patient tolerated procedure well and dressing compresses appropriately and wound vac remains patent.

## 2019-04-08 LAB
BLOOD CULTURE, ROUTINE: NORMAL
BLOOD CULTURE, ROUTINE: NORMAL
GLUCOSE BLD-MCNC: 139 MG/DL (ref 70–108)
GLUCOSE BLD-MCNC: 214 MG/DL (ref 70–108)
GLUCOSE BLD-MCNC: 224 MG/DL (ref 70–108)
GLUCOSE BLD-MCNC: 227 MG/DL (ref 70–108)

## 2019-04-08 PROCEDURE — 6370000000 HC RX 637 (ALT 250 FOR IP): Performed by: NURSE PRACTITIONER

## 2019-04-08 PROCEDURE — 1200000000 HC SEMI PRIVATE

## 2019-04-08 PROCEDURE — 6360000002 HC RX W HCPCS: Performed by: NURSE PRACTITIONER

## 2019-04-08 PROCEDURE — 82948 REAGENT STRIP/BLOOD GLUCOSE: CPT

## 2019-04-08 PROCEDURE — 2580000003 HC RX 258: Performed by: NURSE PRACTITIONER

## 2019-04-08 PROCEDURE — 97530 THERAPEUTIC ACTIVITIES: CPT

## 2019-04-08 PROCEDURE — 97542 WHEELCHAIR MNGMENT TRAINING: CPT

## 2019-04-08 PROCEDURE — 6370000000 HC RX 637 (ALT 250 FOR IP): Performed by: INTERNAL MEDICINE

## 2019-04-08 PROCEDURE — 99232 SBSQ HOSP IP/OBS MODERATE 35: CPT | Performed by: INTERNAL MEDICINE

## 2019-04-08 PROCEDURE — 97605 NEG PRS WND THER DME<=50SQCM: CPT

## 2019-04-08 RX ORDER — CYCLOBENZAPRINE HCL 10 MG
10 TABLET ORAL 3 TIMES DAILY PRN
Qty: 90 TABLET | Refills: 0 | Status: SHIPPED | OUTPATIENT
Start: 2019-04-08 | End: 2019-04-10

## 2019-04-08 RX ADMIN — FAMOTIDINE 20 MG: 20 TABLET ORAL at 23:05

## 2019-04-08 RX ADMIN — INSULIN GLARGINE 55 UNITS: 100 INJECTION, SOLUTION SUBCUTANEOUS at 08:51

## 2019-04-08 RX ADMIN — FAMOTIDINE 20 MG: 20 TABLET ORAL at 08:50

## 2019-04-08 RX ADMIN — Medication 10 ML: at 23:10

## 2019-04-08 RX ADMIN — Medication 10 ML: at 10:05

## 2019-04-08 RX ADMIN — INSULIN LISPRO 6 UNITS: 100 INJECTION, SOLUTION INTRAVENOUS; SUBCUTANEOUS at 16:27

## 2019-04-08 RX ADMIN — CEFTRIAXONE SODIUM 2 G: 2 INJECTION, POWDER, FOR SOLUTION INTRAMUSCULAR; INTRAVENOUS at 23:06

## 2019-04-08 RX ADMIN — MIRTAZAPINE 15 MG: 15 TABLET, FILM COATED ORAL at 23:05

## 2019-04-08 RX ADMIN — INSULIN LISPRO 3 UNITS: 100 INJECTION, SOLUTION INTRAVENOUS; SUBCUTANEOUS at 23:03

## 2019-04-08 RX ADMIN — INSULIN GLARGINE 55 UNITS: 100 INJECTION, SOLUTION SUBCUTANEOUS at 23:03

## 2019-04-08 RX ADMIN — MORPHINE SULFATE 2 MG: 2 INJECTION, SOLUTION INTRAMUSCULAR; INTRAVENOUS at 10:05

## 2019-04-08 RX ADMIN — HYDROCODONE BITARTRATE AND ACETAMINOPHEN 2 TABLET: 5; 325 TABLET ORAL at 08:50

## 2019-04-08 RX ADMIN — SERTRALINE 150 MG: 100 TABLET, FILM COATED ORAL at 08:49

## 2019-04-08 ASSESSMENT — PAIN SCALES - GENERAL
PAINLEVEL_OUTOF10: 6
PAINLEVEL_OUTOF10: 7
PAINLEVEL_OUTOF10: 4
PAINLEVEL_OUTOF10: 7
PAINLEVEL_OUTOF10: 5

## 2019-04-08 ASSESSMENT — PAIN DESCRIPTION - ORIENTATION: ORIENTATION: RIGHT

## 2019-04-08 ASSESSMENT — PAIN DESCRIPTION - PAIN TYPE: TYPE: SURGICAL PAIN

## 2019-04-08 ASSESSMENT — PAIN DESCRIPTION - LOCATION: LOCATION: LEG

## 2019-04-08 NOTE — PLAN OF CARE
Problem: Discharge Planning:  Goal: Discharged to appropriate level of care  Description  Discharged to appropriate level of care  4/8/2019 1955 by Francesca Ho RN  Outcome: Ongoing  4/8/2019 1951 by Francesca Ho RN  Note:   Discharge planning to home with home health for IV ATB. Problem: Serum Glucose Level - Abnormal:  Goal: Ability to maintain appropriate glucose levels will improve to within specified parameters  Description  Ability to maintain appropriate glucose levels will improve to within specified parameters  4/8/2019 1955 by Francesca Ho RN  Outcome: Ongoing  4/8/2019 1951 by rFancesca Ho RN  Note:   Chem sticks AC/HS. Insulin coverage per order. Problem: Skin Integrity - Impaired:  Goal: Absence of new skin breakdown  Description  Absence of new skin breakdown  4/8/2019 1955 by Francesca Ho RN  Outcome: Ongoing  4/8/2019 1951 by Francesca Ho RN  Note:   No new open areas noted. Turns self in bed. Problem: Infection:  Goal: Will remain free from infection  Description  Will remain free from infection  4/8/2019 1955 by Francesca Ho RN  Outcome: Ongoing  4/8/2019 1951 by Francesca Ho RN  Note:   Afebrile. VS stable. Problem: Daily Care:  Goal: Daily care needs are met  Description  Daily care needs are met  4/8/2019 1955 by Francesca Ho RN  Outcome: Ongoing  4/8/2019 1951 by Francesca Ho RN  Note:   Able to do most self care. Assist given when needed. Problem: Falls - Risk of:  Goal: Will remain free from falls  Description  Will remain free from falls  4/8/2019 1955 by Francesca Ho RN  Outcome: Ongoing  4/8/2019 1951 by Francesca Ho RN  Note:   No falls this shift.  Ambulates with assist. Gait steay     Problem: Musculor/Skeletal Functional Status  Goal: Highest potential functional level  Outcome: Ongoing     Problem: Nutrition  Goal: Optimal nutrition therapy  4/8/2019 1955 by Tashia Montague RN  Outcome: Ongoing  4/8/2019 1951 by Tashia Montague RN  Note:   Taking po well. Problem: Pain Control  Goal: Maintain pain level at or below patient's acceptable level (or 5 if patient is unable to determine acceptable level)  4/8/2019 1955 by Tashia Montague RN  Outcome: Ongoing  4/8/2019 1951 by Tashia Montague RN  Flowsheets (Taken 4/8/2019 1951)  Patient's Stated Pain Goal: 5  Note:   Taking po pain meds prn. Periods of rest noted after taking.

## 2019-04-08 NOTE — PROGRESS NOTES
Physical Therapy   [address] 7K - 7K-18/018-A    Time In: 1150  Time Out: 3179  Timed Code Treatment Minutes: 45 Minutes  Minutes: 38          Date: 2019  Patient Name: Tex De La Rosa,  Gender:  male        MRN: [id number]  : 1970  (44 y.o.)     Referring Practitioner: Hunter Conteh DO  Diagnosis: deep postoperative wound infection  Additional Pertinent Hx: Per EMR: \"48 y.o. male who presented to [facility] with \"evaluation of a wound infection. The patient states that his RLE BKA was completed on . Patient admits to have experiencing multiple infections of his BKA since his initial surgery. He states that on 19 that an I&D was completed by Dr. Rustam Perales (orthopedic surgery). Patient states that he noticed increased swelling of his RLE 2 days ago. He reports that home health nursing, who evaluates the patient daily, states that the patient wound was warm and presented with erythema causing him to be sent to the ED for evaluation. He rates his current pain as a 10/10 in severity and aching in character. He states that his wound is packed daily. Patient's denies any fever or chills. His infectious disease specialist is Dr. Jensen Rubalcava patient reports no additional symptoms or complaints at this time. \"-per er note and confirmed by myself\"     Past Medical History:   Diagnosis Date    Bipolar 2 disorder ([address]     previously followed with Dr. Aldia Lara and Jia Courser in [facility] Diabetes mellitus type 2, uncontrolled ([address]     Diabetic foot ulcer ([address]     Diabetic polyneuropathy ([address]     Diabetic ulcer of right foot associated with type 2 diabetes mellitus ([address] 12/10/2015    Essential hypertension     \"never been on b/p medication that I know of\"    GERD (gastroesophageal reflux disease)     Hammer toe of left foot     Heart murmur     denies any chest pain or palpitations    History of tobacco abuse     Hx of BKA, right ([address]     Macular edema, diabetic, bilateral ([address] 05/04/2018    Dr. Keerthi Rodriguez referred to retina specialist for 2nd opinion    Marijuana abuse in remission     Onychomycosis     WD-Skin ulcer of fourth toe of right foot with necrosis of bone ([address] 6/29/2016     Past Surgical History:   Procedure Laterality Date    ABSCESS DRAINAGE Right     foot    FOOT DEBRIDEMENT Right 07/01/2016    I & D    INCISION AND DRAINAGE Right 2/18/2019    I&D RIGHT STUMP performed by Tania Khan MD at [address] [facility] [facility] Right 07/20/2016    LEG AMPUTATION BELOW KNEE Right 4/4/2019    I&D AND REVISION OF AMPUTATION RIGHT LEG performed by Tania Khan MD at [address] [facility] Right 1/14/15    sole of foot I&D    NH DRAIN INFECT SHOULDER BURSA Left 8/18/2017    LEFT SHOULDER INCISION AND DRAINAGE performed by Tania Khan MD at [address] OFFICE/OUTPT [address] [facility] Right 9/20/2018    EXCISIONAL DEBRIDEMENT RIGHT BKA STUMP performed by Lynda Valera MD at [address] Right 1/16/15    2nd toe with wound vac applied    WISDOM TOOTH EXTRACTION  ? when       Restrictions/Precautions:  General Precautions(Right BKA)                            Prior Level of Function:  ADL Assistance: Independent  Homemaking Assistance: Independent  Ambulation Assistance: Independent(mostly uses WC)  Transfer Assistance: Independent(short distance with 2WW)  Additional Comments: Pt reports I prior to admission. Pt was recently d/c from TCU at the beginning of March. Pt notes he was mobilizing while prior to recent admission. Subjective:     Subjective: RN approved session.  Pt up in wc at arrival. Pt agreeable to session, pt very talkative and request to wc around hospital.     Pain:   .  Pain Assessment  Pain Level: 4       Social/Functional:  Lives With: (Roomate )  Type of Home: House(restoration home)  Home Layout: One level  Home Access: Ramped entrance  Home Equipment: Rolling walker, Wheelchair-manual(Pt needs new WC, current WC was donated from a friend and is not in good shape. Pt will benefit from [facility] fitting in an outpatient setting.)     Objective:       Transfers  Sit to Stand: Modified independent  Stand to sit: Modified independent(therapist holds on to wc d/t poor brakes)       Ambulation 1  Surface: level tile  Device: Rolling Walker  Other Apparatus: (wound vac to RLE)  Assistance: Modified Independent  Quality of Gait: good hop-to gait pattern, steady mobility, no LOB  Distance: 40ftx1    Stairs  # Steps : 1  Rails: None  Curbs: 6\"  Device: Rolling walker  Assistance: Contact guard assistance  Comment: pt performs retro, performs well, good hop clearance         Wheelchair Activities  Wheelchair Type: Standard  Wheelchair Cushion: None  Level of Assistance for pressure relief activities: Independent  Propulsion: Yes  Propulsion 1  Propulsion: Manual  Level: Level Tile  Method: RUE;LUE;LLE  Level of Assistance: Independent  Distance: ~[id number](all over 7k unit, rehab unit and TCU unit)    Exercises:  Exercises  Comments: none at this time             Activity Tolerance:  Activity Tolerance: Patient Tolerated treatment well;Patient limited by fatigue;Patient limited by endurance    Assessment: Body structures, Functions, Activity limitations: Decreased balance, Decreased endurance  Assessment: Pt tolerated session well at this time. Minor c/o increase pain with standing. Pt mod I with most task. Pt moving well at this time. Pt may benefit from cont therapy once vac gone and pros able to be worn.    Prognosis: Good     REQUIRES PT FOLLOW UP: Yes    Discharge Recommendations:  Discharge Recommendations: Continue to assess pending progress, Patient would benefit from continued therapy after discharge, Home with assist PRN    Patient Education:  Patient Education: curb step, POC    Equipment Recommendations:  Equipment Needed: Yes  Mobility Devices: Wheelchair  Wheelchair: Light Weight(WC cushion/pressure relief, leg rests)    Safety:  Type of devices: All fall risk precautions in place, Gait belt, Left in chair, Nurse notified    Plan:  Times per week: 1-3xGM  Specific instructions for Next Treatment: advance as tolerated   Current Treatment Recommendations: Transfer Training, Strengthening, Endurance Training, ROM, Balance Training, Functional Mobility Training, Stair training, IADL Training, Gait Training, Home Exercise Program    Goals:  Patient goals : return home    Short term goals  Time Frame for Short term goals: 2 weeks  Short term goal 1: pt to ambulate 50ft with use of LRAD and supervision A   Short term goal 2: pt to negotiate curb step with stand-by assist and 2WW for safe mobility in the community     Long term goals  Time Frame for Long term goals : N/A secondary to short ELOS  If patient is discharged prior to progress note completion, this note is to serve as the discharge note with all goals being unmet unless indicated otherwise.          AM-PAC Inpatient Mobility Raw Score : 23  AM-PAC Inpatient T-Scale Score : 56.93  Mobility Inpatient CMS 0-100% Score: 11.2  Mobility Inpatient CMS G-Code Modifier : CI

## 2019-04-08 NOTE — PROGRESS NOTES
in the last 72 hours. No results for input(s): INR in the last 72 hours. No results for input(s): Clement Reasoner in the last 72 hours. Urinalysis:      Lab Results   Component Value Date    NITRU NEGATIVE 02/15/2019    WBCUA 0-2 02/15/2019    BACTERIA NONE 02/15/2019    RBCUA 0-2 02/15/2019    BLOODU SMALL 02/15/2019    SPECGRAV >1.030 10/17/2018    GLUCOSEU >= 1000 02/15/2019       Diagnostic imaging/procedures       Ct Tibia Fibula Right W Contrast    Result Date: 4/1/2019  PROCEDURE: NONCONTRAST CT RIGHT LOWER EXTREMITY: CLINICAL INFORMATION: Prior below-the-knee interpretation. Recurrent cellulitis at the amputation site. TECHNIQUE: Multiple axial 3 mm images of the right lower extremity (with attention to the stomach) were obtained following the administration of intravenous contrast material. Computer generated sagittal and coronal images of the right lower extremity were also reconstructed. ALL CT SCANS AT THIS FACILITY use dose modulation, iterative reconstruction, and/or weight-based dosing when appropriate to reduce radiation dose to as low as reasonably achievable. FINDINGS: There is an open wound along the lateral aspect of the fibular head which was not \"on the prior study from February 15, suggesting possible wound dehiscence. There is persistent soft tissue swelling along the lateral aspect of the knee and extending into the stomach, as on the prior study, consistent with cellulitis. No definite drainable fluid collection is seen. No bone destruction is seen. The previous noted subcutaneous emphysema on the prior study has resolved. .     1. No evidence for osteomyelitis. Persistent findings of cellulitis involving the stump. No abscess or drainable fluid collection is seen. 2. Note that there is a deep wound along the lateral aspect of the fibular head, which was not present previously, suggesting possible wound dehiscence. Clinical correlation recommended.  **This report has been created using voice recognition software. It may contain minor errors which are inherent in voice recognition technology. ** Final report electronically signed by Dr. Wicho Freeman on 4/1/2019 2:36 PM    Mri Tibula Fibula Right Wo Contrast    Result Date: 4/3/2019  PROCEDURE: MRI TIBIA FIBULA RIGHT WO CONTRAST CLINICAL INFORMATION: INFECTION, open wound, erythema and swelling on the BKA stump COMPARISON: No prior study. TECHNIQUE: Coronal T1 and STIR bilateral lower legs, axial T1, FST2 and STIR lower leg and sagittal T1 and STIR lower leg. FINDINGS: ALIGNMENT: Anatomic. MARROW/MARROW EDEMA/FRACTURE: Moderate marrow edema within the residual fibula suspicious for osteomyelitis. No definite tibial marrow edema. OSSEOUS DESTRUCTION/PERIOSTITIS: Small area of cortical destruction within the fibular stump. KNEE JOINTS: Moderate joint effusion at the knee. TENDONS: Unremarkable. MUSCULATURE: Marked muscular edema involving the anterolateral muscle group of the lower leg. There is severe such change in the lateral gastrocnemius. NEUROVASCULAR: Unremarkable. SOFT TISSUES: There is a large soft tissue ulcer laterally near the stump of the fibula. Marked soft tissue inflammation is seen throughout this region. Myositis and deep cellulitis are favored. There are a few areas of susceptibility artifact suspicious  for gas-forming infection. This extends down to the fibular stump. It abuts the tibial stump laterally without definite tibial marrow edema. OTHER: None. Moderate osteomyelitis within the residual fibula adjacent to an ulcer and marked adjacent cellulitis and myositis. **This report has been created using voice recognition software. It may contain minor errors which are inherent in voice recognition technology. ** Final report electronically signed by Dr. Kirsten Hamm on 4/3/2019 5:07 PM      DVT prophylaxis: ? Lovenox                                 ? SCDs                                 ?  SQ Heparin ? Encourage ambulation           ? Already on Anticoagulation     Disposition:    ? Home with IV antibiotics? ? TCU       ? Rehab       ? Psych       ? SNF       ? [facility]? [facility]?  Rehab?       ? Other-

## 2019-04-08 NOTE — PROGRESS NOTES
Progress note: Infectious diseases    Patient - Arthur Gtz,  Age - 44 y.o.    - 1970      Room Number - 7K-18/018-A   MRN -  [id number]   Acct # - [de-identified]  Date of Admission -  2019  4:11 PM    SUBJECTIVE:   No new issues. OBJECTIVE   VITALS    height is 5' 6\" (1.676 m) and weight is 222 lb (100.7 kg). His oral temperature is 98.2 °F (36.8 °C). His blood pressure is 121/57 (abnormal) and his pulse is 96. His respiration is 18 and oxygen saturation is 99%. Wt Readings from Last 3 Encounters:   19 222 lb (100.7 kg)   19 222 lb (100.7 kg)   19 222 lb 1.6 oz (100.7 kg)       I/O (24 Hours)    Intake/Output Summary (Last 24 hours) at 2019 1323  Last data filed at 2019 0742  Gross per 24 hour   Intake 1926.56 ml   Output 2375 ml   Net -448. 44 ml       General Appearance  Awake, alert, oriented,  not  In acute distress  HEENT - normocephalic, atraumatic, pink conjunctiva,  anicteric sclera  Neck - Supple, no mass  Lungs -  Bilateral good air entry, no rhonchi, no wheeze  Cardiovascular - Heart sounds are normal.    Abdomen - soft, not distended, nontender,   Neurologic - oriented  Skin - No bruising or bleeding  Extremities - wound VAC over his right below knee amputation stump.              MEDICATIONS:      enoxaparin  40 mg Subcutaneous Q24H    insulin glargine  55 Units Subcutaneous BID    cefTRIAXone (ROCEPHIN) IV  2 g Intravenous Q24H    mirtazapine  15 mg Oral Nightly    sertraline  150 mg Oral Daily    sodium chloride flush  10 mL Intravenous 2 times per day    famotidine  20 mg Oral BID    insulin lispro  0-18 Units Subcutaneous TID     insulin lispro  0-9 Units Subcutaneous Nightly      dextrose       morphine **OR** morphine, cyclobenzaprine, HYDROcodone 5 mg - acetaminophen **OR** HYDROcodone 5 mg - acetaminophen, sodium chloride flush, magnesium hydroxide, postoperative wound infection T81.42XA    Infective myositis of right lower leg M60.061    Pyogenic inflammation of bone (HCC) M86.9         ASSESSMENT/PLAN     · Infected right BKA stump: had debridment and revision of the stump: Now on wound vacuum therapy  · Poorly controlled diabetes: Needs better control of his blood sugar.   · Continue current IV antibiotic  · PICC line to be place it today    Radha Richardson MD, FACP 4/8/2019 1:23 PM

## 2019-04-08 NOTE — PROGRESS NOTES
Consult received. Chart reviewed. Spoke with Dr. Brian Golden and patient is doing too well with therapy for inpatient rehab.

## 2019-04-08 NOTE — PROGRESS NOTES
Present to pt room for right stump wound check and wound vac change. Removed old dressing. Wound appears smaller in size. Wound assessment per flowsheet below. Cleansed wound with normal saline and gauze. Applied skin prep to rosita wound. Applied stoma wafer to rosita wound to protect good skin. Applied snaked foam x 1 piece to wound bed followed by clear drape. Cut quarter size hole in center of drape over sponge area and apply vac suction. Wound vac settings at 125 mmHg continuous suction. Staff to apply extra clear drape as needed if leaks noted and to change canister as needed when full. Wound ostomy to change wound vac three times weekly. Pt bed in low, call light in reach. Wound Care Documentation:  Wound 02/20/19 Other (Comment) Right;Lateral (Active)   Wound Image   4/8/2019 10:33 AM   Wound Non-Healing Surgical 4/8/2019 10:33 AM   Dressing Status Changed 4/8/2019 10:33 AM   Dressing Changed Changed/New 4/8/2019 10:33 AM   Dressing/Treatment Vacuum dressing 4/8/2019 10:33 AM   Wound Cleansed Rinsed/Irrigated with saline 4/8/2019 10:33 AM   Dressing Change Due 04/10/19 4/8/2019 10:33 AM   Wound Length (cm) 10 cm 4/8/2019 10:33 AM   Wound Width (cm) 3.6 cm 4/8/2019 10:33 AM   Wound Depth (cm) 2.2 cm 4/8/2019 10:33 AM   Wound Surface Area (cm^2) 36 cm^2 4/8/2019 10:33 AM   Change in Wound Size % (l*w) -3650 4/8/2019 10:33 AM   Wound Volume (cm^3) 79.2 cm^3 4/8/2019 10:33 AM   Wound Healing % -5767 4/8/2019 10:33 AM   Distance Tunneling (cm) 1.7 cm 3/27/2019 10:59 AM   Tunneling Position ___ O'Clock 12 3/27/2019 10:59 AM   Wound Assessment Black; Yellow;Red;Pink 4/8/2019 10:33 AM   Drainage Amount Moderate 4/8/2019 10:33 AM   Drainage Description Serosanguinous 4/8/2019 10:33 AM   Odor None 4/8/2019 10:33 AM   Margins Attached edges 4/8/2019 10:33 AM   Rosita-wound Assessment Dry; Intact 4/8/2019 10:33 AM   Whiteface%Wound Bed 50 4/8/2019 10:33 AM   Red%Wound Bed 20 4/8/2019 10:33 AM   Yellow%Wound Bed 20 4/8/2019 10:33 AM   Black%Wound Bed 10 4/8/2019 10:33 AM   Number of days: 46

## 2019-04-08 NOTE — PLAN OF CARE
Problem: Skin Integrity - Impaired:  Goal: Absence of new skin breakdown  Description  Absence of new skin breakdown  4/7/2019 2335 by Mehul Mejía RN  Outcome: Met This Shift  Note:   No skin breakdown noted to patient this shift. Patient is able to reposition himself in bed in order to prevent skin breakdown. Will monitor. Problem: Infection:  Goal: Will remain free from infection  Description  Will remain free from infection  4/7/2019 2335 by Mehul Mejía RN  Outcome: Met This Shift  Note:   No signs or symptoms of infection noted to patient this shift. Patient afebrile and vitals stable. Problem: Falls - Risk of:  Goal: Will remain free from falls  Description  Will remain free from falls  4/7/2019 2335 by Mehul Mejía RN  Outcome: Met This Shift  Note:   Patient has not had any falls during this shift. Bed in lowest position with wheels locked and call light in reach. Patient compliant with using call light to request assistance from staff when needed. Fall prevention measures in place and patient compliant. Hourly rounding in place and bed alarm on. Problem: Musculor/Skeletal Functional Status  Goal: Highest potential functional level  4/7/2019 2335 by Mehul Mejía RN  Outcome: Met This Shift  Note:   Patient up to wheelchair independently and tolerating well. Problem: Nutrition  Goal: Optimal nutrition therapy  4/7/2019 2335 by Mehul Mejía RN  Outcome: Met This Shift  Note:   Patient on carb control diet and tolerating well. No nausea/vomiting noted to patient this shift. Problem: Discharge Planning:  Goal: Discharged to appropriate level of care  Description  Discharged to appropriate level of care  4/7/2019 2335 by Mehul Mejía RN  Outcome: Ongoing  Note:   Patient planning [facility] at discharge.  assisting patient with discharge needs.       Problem: Serum Glucose Level - Abnormal:  Goal: Ability to maintain appropriate glucose levels will improve to within specified parameters  Description  Ability to maintain appropriate glucose levels will improve to within specified parameters  4/7/2019 2335 by Bird Wild RN  Outcome: Ongoing  Note:   Patient's chems remain elevated. Insulin given per orders. Problem: Daily Care:  Goal: Daily care needs are met  Description  Daily care needs are met  4/7/2019 2335 by Bird Wild RN  Outcome: Ongoing  Note:   Patient requires assistance from staff with ADLs. Patient is able to use call light to request assistance when needed. Will continue to address patient's needs as they arise. Problem: Pain Control  Goal: Maintain pain level at or below patient's acceptable level (or 5 if patient is unable to determine acceptable level)  4/7/2019 2335 by Bird Wild RN  Outcome: Ongoing  Note:   Patient with stated pain goal of 4/10. Patient verbalizing pain to right BKA this shift. Patient taking norco every four hours PRN for pain and voices relief of pain. Periods of rest noted to patient after administering pain medications. Right leg elevated for comfort. Will continue to assess patient for pain and administer pain medication as appropriate. Problem: Activity Intolerance:  Goal: Ability to tolerate increased activity will improve  Description  Ability to tolerate increased activity will improve  4/7/2019 2335 by Bird Wild RN  Outcome: Completed  Note:   Patient tolerating activity without any problems. Care plan reviewed with patient. Patient verbalizes understanding of plan of care and contributes to goal setting.

## 2019-04-08 NOTE — PROGRESS NOTES
Consult received. Chart reviewed. Await therapy evals and Physiatry recommendations. Patient is not a TCU candidate.

## 2019-04-08 NOTE — PROGRESS NOTES
Arrived to patient's room for midline insertion procedure. Assessed right arm to find right basilic vein. Right basilic vein chosen. Patient draped in sterile procedure garb. Right basilic vein accessed with needle. Blood return noted. Guide wire threaded. Unable to advance. Instructed patient to take deep breaths. Attempted to advance guide wire further and unsuccessful. Attempted to re access right basilic vein again and unsuccessful. Needle removed and patient cleaned up. Primary nurse notified and will contact physician.

## 2019-04-09 ENCOUNTER — APPOINTMENT (OUTPATIENT)
Dept: INTERVENTIONAL RADIOLOGY/VASCULAR | Age: 49
DRG: 493 | End: 2019-04-09
Payer: MEDICARE

## 2019-04-09 LAB
AEROBIC CULTURE: ABNORMAL
ANAEROBIC CULTURE: ABNORMAL
GLUCOSE BLD-MCNC: 128 MG/DL (ref 70–108)
GLUCOSE BLD-MCNC: 144 MG/DL (ref 70–108)
GLUCOSE BLD-MCNC: 180 MG/DL (ref 70–108)
GLUCOSE BLD-MCNC: 187 MG/DL (ref 70–108)
GRAM STAIN RESULT: ABNORMAL
ORGANISM: ABNORMAL

## 2019-04-09 PROCEDURE — 6360000002 HC RX W HCPCS: Performed by: NURSE PRACTITIONER

## 2019-04-09 PROCEDURE — 2580000003 HC RX 258: Performed by: NURSE PRACTITIONER

## 2019-04-09 PROCEDURE — 99232 SBSQ HOSP IP/OBS MODERATE 35: CPT | Performed by: INTERNAL MEDICINE

## 2019-04-09 PROCEDURE — 02HV33Z INSERTION OF INFUSION DEVICE INTO SUPERIOR VENA CAVA, PERCUTANEOUS APPROACH: ICD-10-PCS | Performed by: RADIOLOGY

## 2019-04-09 PROCEDURE — 36573 INSJ PICC RS&I 5 YR+: CPT | Performed by: RADIOLOGY

## 2019-04-09 PROCEDURE — 6370000000 HC RX 637 (ALT 250 FOR IP): Performed by: INTERNAL MEDICINE

## 2019-04-09 PROCEDURE — 6360000002 HC RX W HCPCS: Performed by: INTERNAL MEDICINE

## 2019-04-09 PROCEDURE — 82948 REAGENT STRIP/BLOOD GLUCOSE: CPT

## 2019-04-09 PROCEDURE — 2500000003 HC RX 250 WO HCPCS

## 2019-04-09 PROCEDURE — 1200000000 HC SEMI PRIVATE

## 2019-04-09 PROCEDURE — 6370000000 HC RX 637 (ALT 250 FOR IP): Performed by: NURSE PRACTITIONER

## 2019-04-09 PROCEDURE — C1751 CATH, INF, PER/CENT/MIDLINE: HCPCS

## 2019-04-09 PROCEDURE — 6360000002 HC RX W HCPCS

## 2019-04-09 PROCEDURE — 2709999900 HC NON-CHARGEABLE SUPPLY

## 2019-04-09 RX ADMIN — MORPHINE SULFATE 2 MG: 2 INJECTION, SOLUTION INTRAMUSCULAR; INTRAVENOUS at 21:39

## 2019-04-09 RX ADMIN — INSULIN GLARGINE 55 UNITS: 100 INJECTION, SOLUTION SUBCUTANEOUS at 08:17

## 2019-04-09 RX ADMIN — MIRTAZAPINE 15 MG: 15 TABLET, FILM COATED ORAL at 21:39

## 2019-04-09 RX ADMIN — ENOXAPARIN SODIUM 40 MG: 40 INJECTION SUBCUTANEOUS at 08:16

## 2019-04-09 RX ADMIN — FAMOTIDINE 20 MG: 20 TABLET ORAL at 08:11

## 2019-04-09 RX ADMIN — CEFTRIAXONE SODIUM 2 G: 2 INJECTION, POWDER, FOR SOLUTION INTRAMUSCULAR; INTRAVENOUS at 21:38

## 2019-04-09 RX ADMIN — Medication 10 ML: at 21:40

## 2019-04-09 RX ADMIN — INSULIN LISPRO 2 UNITS: 100 INJECTION, SOLUTION INTRAVENOUS; SUBCUTANEOUS at 21:39

## 2019-04-09 RX ADMIN — SERTRALINE 150 MG: 100 TABLET, FILM COATED ORAL at 08:12

## 2019-04-09 RX ADMIN — INSULIN LISPRO 3 UNITS: 100 INJECTION, SOLUTION INTRAVENOUS; SUBCUTANEOUS at 12:34

## 2019-04-09 RX ADMIN — INSULIN LISPRO 3 UNITS: 100 INJECTION, SOLUTION INTRAVENOUS; SUBCUTANEOUS at 17:28

## 2019-04-09 RX ADMIN — HYDROCODONE BITARTRATE AND ACETAMINOPHEN 2 TABLET: 5; 325 TABLET ORAL at 08:10

## 2019-04-09 RX ADMIN — INSULIN GLARGINE 55 UNITS: 100 INJECTION, SOLUTION SUBCUTANEOUS at 21:39

## 2019-04-09 RX ADMIN — HYDROCODONE BITARTRATE AND ACETAMINOPHEN 2 TABLET: 5; 325 TABLET ORAL at 12:32

## 2019-04-09 RX ADMIN — MORPHINE SULFATE 2 MG: 2 INJECTION, SOLUTION INTRAMUSCULAR; INTRAVENOUS at 15:44

## 2019-04-09 RX ADMIN — FAMOTIDINE 20 MG: 20 TABLET ORAL at 21:39

## 2019-04-09 ASSESSMENT — PAIN DESCRIPTION - ONSET
ONSET: ON-GOING
ONSET: ON-GOING

## 2019-04-09 ASSESSMENT — PAIN DESCRIPTION - DESCRIPTORS
DESCRIPTORS: SHARP

## 2019-04-09 ASSESSMENT — PAIN DESCRIPTION - PAIN TYPE: TYPE: SURGICAL PAIN;ACUTE PAIN

## 2019-04-09 ASSESSMENT — PAIN DESCRIPTION - FREQUENCY
FREQUENCY: CONTINUOUS
FREQUENCY: INTERMITTENT

## 2019-04-09 ASSESSMENT — PAIN DESCRIPTION - ORIENTATION
ORIENTATION: RIGHT

## 2019-04-09 ASSESSMENT — PAIN SCALES - GENERAL
PAINLEVEL_OUTOF10: 6
PAINLEVEL_OUTOF10: 8
PAINLEVEL_OUTOF10: 5
PAINLEVEL_OUTOF10: 4
PAINLEVEL_OUTOF10: 7

## 2019-04-09 ASSESSMENT — PAIN DESCRIPTION - LOCATION
LOCATION: LEG

## 2019-04-09 ASSESSMENT — PAIN - FUNCTIONAL ASSESSMENT
PAIN_FUNCTIONAL_ASSESSMENT: ACTIVITIES ARE NOT PREVENTED
PAIN_FUNCTIONAL_ASSESSMENT: ACTIVITIES ARE NOT PREVENTED

## 2019-04-09 NOTE — PROGRESS NOTES
1015 pt to specials per bed. Procedure explained and consent signed per dr Maria R Serrano. 1020 assist pt to table with wound vac in place to right leg. Site rite at bedside. 1025 procedure started to right arm area per dr Maria R Serrano  1032 procedure completed per dr. Epstein Asp and opsite applied. No bleeding,swelling noted. Flushes well with good blood return. posiflow attached  [id number] return to bed. Hob elevated. Dressing dry and intact.    1043 to floor per transport

## 2019-04-09 NOTE — PROGRESS NOTES
Hospitalist Progress Note    Patient:  Wendi Dickey  YOB: 1970  MRN: [id number]   PCP: No primary care provider on file. Acct: [de-identified]  Unit/Bed: 7K-18/018-A    Date of Admission: 4/2/2019      ASSESSMENT     1. Strep agalactiae diabetic ulcer, cellulitis/myositis, acute on chronic osteomyelitis of the fibula of right lower extremity at A S/P I&d on 4/4  1. Seen on MRI on 4/3  2. Being followed by Dr. Eugenia Najera. Appreciate recommendations  3. Previous cxs on 4/1 show Group B strep, new cx from 4/4 growing Strep agalactiae  4. Was on  aztreonam and Vancomycin and antibiotic changed to Ceftriaxone on 4/3  5. Had I&D with Orthopedic Surgery on 4/4.   6. Wound care- Wound vac placed on 4/5  7. Patient currently looking at [facility] placement. Declined for [place]. Considering [address]. 8. PICC line placed on 4/9 for Abx administration. 2. Hyponatremia  1. Improved  3. Uncontrolled DM Type 2, HgbA1C 9.1  1. On Lantus 55 units bid, stable  4. Chronic co-morbidities:  1. Essential HTN  2. Bipolar disorder  3. History of R BKA    PLAN     1. Waiting for placement at [facility]. 2. PICC line placed for IV antibiotic administration on 4/9  3. Continue to monitor    Anticipated Discharge in :  1 to 2 days    Code Status: Full Code    Electronically signed by Migdalia Armijo MD on 4/9/2019 at 2:57 PM      Chief Complaint     Right BKA  Infection      SUBJECTIVE     The patient is a 44 y.o. Tiki Ayala yo male who was admitted on 4/2/2019 for increased erythema, swelling and pain at right BKA for 3 days. Previous cxs grew Group B Strep    - Patient stable.  Pain about 5/10 at posterior stump at right lower extremity.   - PICC line placed today    OBJECTIVE     Medications:  Reviewed    Infusion Medications    dextrose       Scheduled Medications    enoxaparin  40 mg Subcutaneous Q24H    insulin glargine  55 Units Subcutaneous BID    cefTRIAXone (ROCEPHIN) IV  2 g Intravenous Q24H    mirtazapine  15 mg Oral Nightly    sertraline  150 mg Oral Daily    sodium chloride flush  10 mL Intravenous 2 times per day    famotidine  20 mg Oral BID    insulin lispro  0-18 Units Subcutaneous TID WC    insulin lispro  0-9 Units Subcutaneous Nightly     PRN Meds: morphine **OR** morphine, cyclobenzaprine, HYDROcodone 5 mg - acetaminophen **OR** HYDROcodone 5 mg - acetaminophen, sodium chloride flush, magnesium hydroxide, ondansetron, glucose, dextrose, glucagon (rDNA), dextrose, magnesium sulfate, potassium chloride **OR** potassium alternative oral replacement **OR** potassium chloride, acetaminophen    Ins and outs:      Intake/Output Summary (Last 24 hours) at 4/9/2019 1457  Last data filed at 4/9/2019 1355  Gross per 24 hour   Intake 1183.88 ml   Output 2370 ml   Net -1186.12 ml       Physical Examination     BP (!) 121/58   Pulse 106   Temp 98.4 °F (36.9 °C) (Oral)   Resp 16   Ht 5' 6\" (1.676 m)   Wt 222 lb (100.7 kg)   SpO2 97%   BMI 35.83 kg/m²     General appearance: No apparent distress. HEENT: Extraocular motion intact. Neck: Supple  Respiratory: Decreased breath sounds at bilateral lung bases. Cardiovascular: RRR with normal S1/S2. 2/6 FIGUEROA without rubs or gallops. Abdomen: Soft, non-tender, non-distended with normal bowel sounds. Musculoskeletal: Right BKA. Erythema and swelling at right lower extremity stump improved. Wound vac in place with  drainage  Neurologic: Grossly non focal. CN: II-XII intact  Psychiatric: Alert and oriented  Vascular: Radial pulses palpable bilaterally. Skin:  Erythema and swelling at right lower extremity stump improved. Wound vac in place with brownish drainage  LINE: PICC line at right upper extremity      Labs     No results for input(s): WBC, HGB, HCT, PLT in the last 72 hours. No results for input(s): NA, K, CL, CO2, BUN, CREATININE, CALCIUM, PHOS in the last 72 hours.     Invalid input(s): MAGNES  No results for input(s): AST, ALT, BILIDIR, BILITOT, ALKPHOS in the last 72 hours. No results for input(s): INR in the last 72 hours. No results for input(s): [place] in the last 72 hours. Urinalysis:      Lab Results   Component Value Date    NITRU NEGATIVE 02/15/2019    WBCUA 0-2 02/15/2019    BACTERIA NONE 02/15/2019    RBCUA 0-2 02/15/2019    BLOODU SMALL 02/15/2019    SPECGRAV >1.030 10/17/2018    GLUCOSEU >= 1000 02/15/2019       Diagnostic imaging/procedures       Ct Tibia Fibula Right W Contrast    Result Date: 4/1/2019  PROCEDURE: NONCONTRAST CT RIGHT LOWER EXTREMITY: CLINICAL INFORMATION: Prior below-the-knee interpretation. Recurrent cellulitis at the amputation site. TECHNIQUE: Multiple axial 3 mm images of the right lower extremity (with attention to the stomach) were obtained following the administration of intravenous contrast material. Computer generated sagittal and coronal images of the right lower extremity were also reconstructed. ALL CT SCANS AT THIS FACILITY use dose modulation, iterative reconstruction, and/or weight-based dosing when appropriate to reduce radiation dose to as low as reasonably achievable. FINDINGS: There is an open wound along the lateral aspect of the fibular head which was not \"on the prior study from February 15, suggesting possible wound dehiscence. There is persistent soft tissue swelling along the lateral aspect of the knee and extending into the stomach, as on the prior study, consistent with cellulitis. No definite drainable fluid collection is seen. No bone destruction is seen. The previous noted subcutaneous emphysema on the prior study has resolved. .     1. No evidence for osteomyelitis. Persistent findings of cellulitis involving the stump. No abscess or drainable fluid collection is seen. 2. Note that there is a deep wound along the lateral aspect of the fibular head, which was not present previously, suggesting possible wound dehiscence. Clinical correlation recommended.  **This report has been created using voice ? Encourage ambulation           ? Already on Anticoagulation     Disposition:    ? Home with IV antibiotics? ? TCU       ? Rehab       ? Psych       ? SNF       ? [facility]?  Rehab?       ? Other-

## 2019-04-09 NOTE — CARE COORDINATION
4/9/19, 10:19 AM    DISCHARGE BARRIERS      Spoke with Bettina Hernandez about discharge plan. Explained that he does not have medicare days remaining for TCU, as TCU does not accept medicaid, and that inpt rehab has declined. Discussed ecf, but Bettina Ayalaby prefers to go home with home infusion, rather than go to an ecf. Discussed with RN and care manager. He is current with [facility].

## 2019-04-09 NOTE — PROGRESS NOTES
Progress note: Infectious diseases    Patient - Martín Singh,  Age - 44 y.o.    - 1970      Room Number - 7K-18/018-A   MRN -  [id number]   Acct # - [de-identified]  Date of Admission -  2019  4:11 PM    SUBJECTIVE:   No new issues. OBJECTIVE   VITALS    height is 5' 6\" (1.676 m) and weight is 222 lb (100.7 kg). His oral temperature is 98.4 °F (36.9 °C). His blood pressure is 121/58 (abnormal) and his pulse is 106. His respiration is 16 and oxygen saturation is 97%. Wt Readings from Last 3 Encounters:   19 222 lb (100.7 kg)   19 222 lb (100.7 kg)   19 222 lb 1.6 oz (100.7 kg)       I/O (24 Hours)    Intake/Output Summary (Last 24 hours) at 2019 1543  Last data filed at 2019 1355  Gross per 24 hour   Intake 1183.88 ml   Output 2370 ml   Net -1186.12 ml       General Appearance  Awake, alert, oriented,  not  In acute distress  HEENT - normocephalic, atraumatic, pink conjunctiva,  anicteric sclera  Neck - Supple, no mass  Lungs -  Bilateral good air entry, no rhonchi, no wheeze  Cardiovascular - Heart sounds are normal.    Abdomen - soft, not distended, nontender,   Neurologic - oriented  Skin - No bruising or bleeding  Extremities - wound VAC over his right below knee amputation stump.           MEDICATIONS:      enoxaparin  40 mg Subcutaneous Q24H    insulin glargine  55 Units Subcutaneous BID    cefTRIAXone (ROCEPHIN) IV  2 g Intravenous Q24H    mirtazapine  15 mg Oral Nightly    sertraline  150 mg Oral Daily    sodium chloride flush  10 mL Intravenous 2 times per day    famotidine  20 mg Oral BID    insulin lispro  0-18 Units Subcutaneous TID     insulin lispro  0-9 Units Subcutaneous Nightly      dextrose       morphine **OR** morphine, cyclobenzaprine, HYDROcodone 5 mg - acetaminophen **OR** HYDROcodone 5 mg - acetaminophen, sodium chloride flush, magnesium hydroxide, ondansetron, glucose, dextrose, glucagon (rDNA), dextrose, magnesium sulfate, potassium chloride **OR** potassium alternative oral replacement **OR** potassium chloride, acetaminophen       Problem list of patient:     Patient Active Problem List   Diagnosis Code    Status post below knee amputation of right lower extremity [facility] Z89.511    Gait disturbance R26.9    Sebaceous cyst L72.3    Uncontrolled type 2 diabetes mellitus with hyperglycemia, with long-term current use of insulin ([facility]) E11.65, Z79.4    Severe bipolar II disorder, recent episode major depressive, remission ([facility]) F31.81    Bipolar 1 disorder ([facility]) F31.9    Leukocytosis D72.829    Hyponatremia E87.1    Normocytic anemia D64.9    Obesity (BMI 30-39. 9) E66.9    Infection of below knee amputation stump ([facility]) T87.40    History of noncompliance with medical treatment Z91.19    Essential hypertension I10    Diabetic ulcer of right lower leg ([facility]) E11.622, L97.919    Hx of right BKA ([facility]) Z89.511    Tobacco dependence F17.200    History of osteomyelitis Z87.39    Gastroesophageal reflux disease without esophagitis K21.9    History of marijuana use Z87.898    Abscess of right leg L02.415    BKA stump complication ([facility]) H86.7    Physical debility R53.81    Diabetic ulcer of left fifth toe ([facility]) E11.621, L97.529    Cellulitis of fifth toe, left L03.032    Right BKA infection ([facility]) T87.43    Cellulitis of right lower extremity L03.115    Anemia D64.9    Electrolyte imbalance E87.8    DM type 2, uncontrolled, with lower extremity ulcer ([facility]) E11.622, E11.65, L97.909    Weakness R53.1    Deep postoperative wound infection T81.42XA    Infective myositis of right lower leg M60.061    Pyogenic inflammation of bone ([facility]) M86.9         ASSESSMENT/PLAN     · Infected right BKA stump: had debridment and revision of the stump: Now on wound vacuum therapy  · Poorly controlled diabetes: Needs better control of his blood sugar.   · Continue

## 2019-04-09 NOTE — PLAN OF CARE
Problem: Discharge Planning:  Goal: Discharged to appropriate level of care  Description  Discharged to appropriate level of care  4/9/2019 0146 by Juan Ferguson RN  Outcome: Ongoing  Note:   Plan is currently home with PICC and long term antibiotic use. Will follow. Problem: Serum Glucose Level - Abnormal:  Goal: Ability to maintain appropriate glucose levels will improve to within specified parameters  Description  Ability to maintain appropriate glucose levels will improve to within specified parameters  4/9/2019 0146 by Juan Ferguson RN  Outcome: Ongoing  Note:   Chem this evening was 224. Humalog and Lantus given. Problem: Skin Integrity - Impaired:  Goal: Absence of new skin breakdown  Description  Absence of new skin breakdown  4/9/2019 0146 by Juan Ferguson RN  Outcome: Ongoing  Note:   No new skin breakdown noted. Patient is able to repositioning by self. Dressing is clean, dry and intact without drainage. Problem: Infection:  Goal: Will remain free from infection  Description  Will remain free from infection  4/9/2019 0146 by Juan Ferguson RN  Outcome: Ongoing  Note:   Afebrile and stable vital signs. Problem: Daily Care:  Goal: Daily care needs are met  Description  Daily care needs are met  4/9/2019 0146 by Juan Ferguson RN  Outcome: Ongoing  Note:   All daily care needs met so far this shift. Will monitor. Problem: Falls - Risk of:  Goal: Will remain free from falls  Description  Will remain free from falls  4/9/2019 0146 by Juan Ferguson RN  Outcome: Ongoing  Note:   Patient remained free of falls this shift. Fall precautions in place. Items within reach, call light within reach and side rails up x2. Hourly rounding in place. Patient verbalizes understanding of using call light to ask for assistance as needed. Will monitor.        Problem: Musculor/Skeletal Functional Status  Goal: Highest potential functional level  4/9/2019 0146 by Juan Ferguson RN  Outcome:

## 2019-04-09 NOTE — CARE COORDINATION
4/9/19, 11:43 AM    DISCHARGE BARRIERS      Spoke with Nichole Sheppard, who now wants to go to an ecf. He requested [facility]. Referral made, but no bed available. His second choice is [facility]. Referral made to [facility]. Facility will review.

## 2019-04-09 NOTE — CARE COORDINATION
4/9/19, 2:51 PM    DISCHARGE BARRIERS    SW spoke with Essence Cr at [facility]. Patient is a corporate denial due to background check. SW updated LEO Carballo and advised that this will be an issue with all facilities and patient will likely need to discharge to home with previously arranged services.

## 2019-04-09 NOTE — CARE COORDINATION
4/9/19, 12:08 PM    DISCHARGE BARRIERS      Spoke with [facility]. Facility has declined due to background check. Skylar Miramontes called [address] [place] [address] himself, and asked for referral to be completed. Referral made to [address] [place] [address] for review.

## 2019-04-09 NOTE — CARE COORDINATION
Discharge Planning Update Note:   To get PICC line per IR today. He is requesting to go home at discharge. Lives at the [facility] [facility]. Will need home wound VAC and Home IV Infusion/ Home Health Nursing. I sent application to [facility] for home wound VAC. [facility] does not supply home IV antibiotics for Medicaid/ Medicare patients. I called referral for Home IV antibiotics to [facility] phone [phone] and faxed requested information. Still need antibiotics Rx from Dr Surekha Irwin to fax to [facility]. [phone]. I called referral to [facility] for nursing for wound VAC management and Home IV infusion.    Electronically signed by Jacqueline Saldana RN on 4/9/19 at 11:12 AM

## 2019-04-09 NOTE — CONSULTS
Patient seen and examined for I&D and revision of an infected RIGHT BKA. Note to follow. Patient is a good candidate for rehab admission and is better candidate for Home Health or ECF while he completes his IV antibiotics. He is already modified independent with ambulation and transfers and has gone 3000' in his [facility] today; thus he greatly exceeds demonstrated need for the acute inpatient rehabilitation unit. Thank you for the consult.     Malu Woodson MD

## 2019-04-10 VITALS
RESPIRATION RATE: 16 BRPM | BODY MASS INDEX: 35.68 KG/M2 | DIASTOLIC BLOOD PRESSURE: 52 MMHG | WEIGHT: 222 LBS | OXYGEN SATURATION: 94 % | TEMPERATURE: 98.6 F | SYSTOLIC BLOOD PRESSURE: 110 MMHG | HEART RATE: 90 BPM | HEIGHT: 66 IN

## 2019-04-10 LAB
GLUCOSE BLD-MCNC: 115 MG/DL (ref 70–108)
GLUCOSE BLD-MCNC: 159 MG/DL (ref 70–108)
GLUCOSE BLD-MCNC: 160 MG/DL (ref 70–108)

## 2019-04-10 PROCEDURE — 2580000003 HC RX 258: Performed by: NURSE PRACTITIONER

## 2019-04-10 PROCEDURE — 6360000002 HC RX W HCPCS: Performed by: NURSE PRACTITIONER

## 2019-04-10 PROCEDURE — 6370000000 HC RX 637 (ALT 250 FOR IP): Performed by: INTERNAL MEDICINE

## 2019-04-10 PROCEDURE — 6360000002 HC RX W HCPCS: Performed by: INTERNAL MEDICINE

## 2019-04-10 PROCEDURE — 97605 NEG PRS WND THER DME<=50SQCM: CPT

## 2019-04-10 PROCEDURE — 99239 HOSP IP/OBS DSCHRG MGMT >30: CPT | Performed by: FAMILY MEDICINE

## 2019-04-10 PROCEDURE — 82948 REAGENT STRIP/BLOOD GLUCOSE: CPT

## 2019-04-10 PROCEDURE — 6370000000 HC RX 637 (ALT 250 FOR IP): Performed by: NURSE PRACTITIONER

## 2019-04-10 RX ORDER — CEFTRIAXONE 1 G/1
2 INJECTION, POWDER, FOR SOLUTION INTRAMUSCULAR; INTRAVENOUS EVERY 24 HOURS
Qty: 52 G | Refills: 0 | Status: ON HOLD | OUTPATIENT
Start: 2019-04-10 | End: 2019-04-30 | Stop reason: HOSPADM

## 2019-04-10 RX ORDER — CYCLOBENZAPRINE HCL 10 MG
10 TABLET ORAL 3 TIMES DAILY PRN
Qty: 40 TABLET | Refills: 0 | Status: ON HOLD | OUTPATIENT
Start: 2019-04-10 | End: 2019-04-18 | Stop reason: HOSPADM

## 2019-04-10 RX ADMIN — ENOXAPARIN SODIUM 40 MG: 40 INJECTION SUBCUTANEOUS at 08:16

## 2019-04-10 RX ADMIN — SERTRALINE 150 MG: 100 TABLET, FILM COATED ORAL at 08:15

## 2019-04-10 RX ADMIN — HYDROCODONE BITARTRATE AND ACETAMINOPHEN 2 TABLET: 5; 325 TABLET ORAL at 08:17

## 2019-04-10 RX ADMIN — Medication 10 ML: at 08:15

## 2019-04-10 RX ADMIN — INSULIN GLARGINE 55 UNITS: 100 INJECTION, SOLUTION SUBCUTANEOUS at 08:19

## 2019-04-10 RX ADMIN — FAMOTIDINE 20 MG: 20 TABLET ORAL at 08:16

## 2019-04-10 RX ADMIN — CEFTRIAXONE SODIUM 2 G: 2 INJECTION, POWDER, FOR SOLUTION INTRAMUSCULAR; INTRAVENOUS at 14:57

## 2019-04-10 RX ADMIN — INSULIN LISPRO 3 UNITS: 100 INJECTION, SOLUTION INTRAVENOUS; SUBCUTANEOUS at 11:06

## 2019-04-10 RX ADMIN — INSULIN LISPRO 3 UNITS: 100 INJECTION, SOLUTION INTRAVENOUS; SUBCUTANEOUS at 16:02

## 2019-04-10 ASSESSMENT — PAIN SCALES - GENERAL
PAINLEVEL_OUTOF10: 8
PAINLEVEL_OUTOF10: 6

## 2019-04-10 NOTE — PROGRESS NOTES
In to see patient to change his VAC dressing on his right stump wound and connect him to his home VAC machine. Pt was in his wheelchair upon our arrival and did transfer to the bed for the dressing change. The old dressing was removed. He did have a pocket on the proximal end of the wound the was leaking a moderate amount of serous fluid. The wound base was 60% red with 40% tan slough noted at the proximal end. The depth of the wound was 3cm. The edges were intact and there was an area of yellow slough noted on the edge at the 2:00 position. The wound was cleansed with saline and dry gauze. Skin prep and premium wafer were placed around the wound edges. The wound was packed with black foam and covered with transparent drape and connected to -125mmHg continuous pressure with the home VAC. He denied need for kerlix or ACE wrap at this time. Instructed pt in use of home VAC. Pt verbalized understanding of instructions.

## 2019-04-10 NOTE — PROGRESS NOTES
CLINICAL PHARMACY NOTE: MEDS TO [address] Select Patient?: No  Total # of Prescriptions Filled: 2   The following medications were delivered to the patient:  Total # of Interventions Completed: 1  Time Spent (min): 15    Additional Documentation: None

## 2019-04-10 NOTE — PLAN OF CARE
Problem: Discharge Planning:  Goal: Discharged to appropriate level of care  Description  Discharged to appropriate level of care  4/9/2019 2345 by Trino Pineda RN  Outcome: Ongoing  Note:   Patient is planning dc to ECF. Problem: Serum Glucose Level - Abnormal:  Goal: Ability to maintain appropriate glucose levels will improve to within specified parameters  Description  Ability to maintain appropriate glucose levels will improve to within specified parameters  4/9/2019 2345 by Trino Pineda RN  Outcome: Ongoing  Note:   CHEM ACHS. Lantus and Humalog usage in place. Problem: Skin Integrity - Impaired:  Goal: Absence of new skin breakdown  Description  Absence of new skin breakdown  4/9/2019 2345 by Trino Pineda RN  Outcome: Ongoing  Note:   No new skin breakdown noted. Dressing is clean, dry and intact. Problem: Infection:  Goal: Will remain free from infection  Description  Will remain free from infection  4/9/2019 2345 by Trino Pineda RN  Outcome: Ongoing  Note:   Afebrile and stable Vs.      Problem: Daily Care:  Goal: Daily care needs are met  Description  Daily care needs are met  Outcome: Ongoing  Note:   All daily care needs met so far. Patient able to verbalize needs. Problem: Falls - Risk of:  Goal: Will remain free from falls  Description  Will remain free from falls  Outcome: Ongoing  Note:   Patient remained free of falls this shift. Fall precautions in place. Items within reach, call light within reach and side rails up x2. Hourly rounding in place. Patient verbalizes understanding of using call light to ask for assistance as needed. Will monitor. Problem: Musculor/Skeletal Functional Status  Goal: Highest potential functional level  Outcome: Ongoing  Note:   Patient is up independently to wheelchair. Problem: Nutrition  Goal: Optimal nutrition therapy  Outcome: Ongoing  Note:   Patient is on carb control diet. Tolerating well.       Problem: Pain Control  Goal: Maintain pain level at or below patient's acceptable level (or 5 if patient is unable to determine acceptable level)  Outcome: Ongoing  Note:   Pain during last assessment was 6/10. IV and oral pain medication in use. Will monitor. Care plan reviewed with patient. Patient verbalize understanding of the plan of care and contribute to goal setting.

## 2019-04-10 NOTE — CARE COORDINATION
4/10/19, 1:48 PM    DISCHARGE BARRIERS      Spoke with Constantino Walls about discharge plan. He is aware that [facility], [facility] and [facility] have declined. He is planning home with [facility] and home infusion. He has asked about getting a new wheelchair, and care manager has addressed this. 4/10/19, 1:51 PM    Discharge plan discussed by  and . Discharge plan reviewed with patient/ family. Patient/ family verbalize understanding of discharge plan and are in agreement with plan. Understanding was demonstrated using the teach back method.    Services After Discharge  Services At/After Discharge: PT, OT, Nursing Services, IV Therapy   IMM Letter  IMM Letter given to Patient/Family/Significant other/Guardian/POA/by[de-identified]   IMM Letter date given[de-identified] 04/10/19  IMM Letter time given[de-identified] [phone]

## 2019-04-10 NOTE — PROGRESS NOTES
Progress note: Infectious diseases    Patient - Yolanda Jerome,  Age - 44 y.o.    - 1970      Room Number - 7K-18/018-A   MRN -  [id number]   Acct # - [de-identified]  Date of Admission -  2019  4:11 PM    SUBJECTIVE:   No new issues. plan for discharge home. OBJECTIVE   VITALS    height is 5' 6\" (1.676 m) and weight is 222 lb (100.7 kg). His oral temperature is 98.6 °F (37 °C). His blood pressure is 110/52 (abnormal) and his pulse is 90. His respiration is 16 and oxygen saturation is 94%. Wt Readings from Last 3 Encounters:   19 222 lb (100.7 kg)   19 222 lb (100.7 kg)   19 222 lb 1.6 oz (100.7 kg)       I/O (24 Hours)    Intake/Output Summary (Last 24 hours) at 4/10/2019 1409  Last data filed at 4/10/2019 1258  Gross per 24 hour   Intake 1490 ml   Output 1550 ml   Net -60 ml       General Appearance  Awake, alert, oriented,  not  In acute distress  HEENT - normocephalic, atraumatic, pink conjunctiva,  anicteric sclera  Neck - Supple, no mass  Lungs -  Bilateral good air entry, no rhonchi, no wheeze  Cardiovascular - Heart sounds are normal.    Abdomen - soft, not distended, nontender,   Neurologic - oriented  Skin - No bruising or bleeding  Extremities - wound VAC over his right below knee amputation stump.           MEDICATIONS:      enoxaparin  40 mg Subcutaneous Q24H    insulin glargine  55 Units Subcutaneous BID    cefTRIAXone (ROCEPHIN) IV  2 g Intravenous Q24H    mirtazapine  15 mg Oral Nightly    sertraline  150 mg Oral Daily    sodium chloride flush  10 mL Intravenous 2 times per day    famotidine  20 mg Oral BID    insulin lispro  0-18 Units Subcutaneous TID     insulin lispro  0-9 Units Subcutaneous Nightly      dextrose       morphine **OR** morphine, cyclobenzaprine, HYDROcodone 5 mg - acetaminophen **OR** HYDROcodone 5 mg - acetaminophen, sodium chloride flush, magnesium hydroxide, ondansetron, glucose, dextrose, glucagon (rDNA), dextrose, magnesium sulfate, potassium chloride **OR** potassium alternative oral replacement **OR** potassium chloride, acetaminophen       Problem list of patient:     Patient Active Problem List   Diagnosis Code    Status post below knee [facility] right lower [facility] Z89.511    Gait disturbance R26.9    Sebaceous cyst L72.3    Uncontrolled type 2 diabetes mellitus with hyperglycemia, with long-term current use of insulin ([facility]) E11.65, Z79.4    Severe bipolar II disorder, recent episode major depressive, remission ([facility]) F31.81    Bipolar 1 disorder ([facility]) F31.9    Leukocytosis D72.829    Hyponatremia E87.1    Normocytic anemia D64.9    Obesity (BMI 30-39. 9) E66.9    Infection of below knee amputation stump ([facility]) T87.40    History of noncompliance with medical treatment Z91.19    Essential hypertension I10    Diabetic ulcer of right lower leg ([facility]) E11.622, L97.919    Hx of right BKA ([facility]) Z89.511    Tobacco dependence F17.200    History of osteomyelitis Z87.39    Gastroesophageal reflux disease without esophagitis K21.9    History of marijuana use Z87.898    Abscess of right leg L02.415    BKA stump complication ([facility]) P84.0    Physical debility R53.81    Diabetic ulcer of left fifth toe ([facility]) E11.621, L97.529    Cellulitis of fifth toe, left L03.032    Right BKA infection ([facility]) T87.43    Cellulitis of right lower extremity L03.115    Anemia D64.9    Electrolyte imbalance E87.8    DM type 2, uncontrolled, with lower extremity ulcer ([facility]) E11.622, E11.65, L97.909    Weakness R53.1    Deep postoperative wound infection T81.42XA    Infective myositis of right lower leg M60.061    Pyogenic inflammation of bone ([facility]) M86.9    Cellulitis and abscess of leg L03.119, L02.419         ASSESSMENT/PLAN     · Infected right BKA stump: had debridment and revision of the stump: Now on wound vacuum therapy  · Poorly controlled diabetes: Needs better control of his blood sugar.   · Continue current IV antibiotic  · PICC line to be placed  · Unable to get placement    Serafin Norwood MD, [address] 4/10/2019 2:09 PM

## 2019-04-10 NOTE — DISCHARGE SUMMARY
Hospitalist Discharge Summary     Patient Identification:  Jordan Quiroz  : 1970  MRN: [id number]   Account: [de-identified]     Admit date: 2019  Discharge date: 4/10/2019    Attending provider: Joanne Purvis MD        Primary care provider: No primary care provider on file. Discharge Diagnoses:     1. Strep agalactiae diabetic ulcer, cellulitis/myositis, acute on chronic osteomyelitis of the fibula of right lower extremity at BKA S/P I&d and revision of BKA on 19  2. Hyponatremia  3. Uncontrolled DM Type 2  4. Essential HTN  5. Normocytic anemia  6. Bipolar disorder  7. History of R BKA  8. Obesity Body mass index is 35.83 kg/m². Hospital Course:   Jordan Quiroz is a 44 y.o. male admitted to [facility] on 2019 for wound infection at BKA site. See H&P by Jeremías Herrera DO on 19 for admitting details. Pt only seen day of d/c by this provider. Information for d/c summary from note and     Per Details from Dr. Fontenot Session note 19 states with additions/modifications added by this provider. 9. Strep agalactiae diabetic ulcer, cellulitis/myositis, acute on chronic osteomyelitis of the fibula of right lower extremity at BKA S/P I&d on   1. Seen on MRI on 4/3  2. Being followed by Dr. Bishop Tavares. Appreciate recommendations  3. Previous cxs on  show Group B strep, new cx from  growing Strep agalactiae  4. Was on  aztreonam and Vancomycin and antibiotic changed to Ceftriaxone on 4/3 per ID and plan 4 total weeks of IV atbx. PICC placed 19  5. Had I&D and revision of BKA with Orthopedic Surgery on 19.   6. Wound care- Wound vac placed on  and changed M/W/F.    10. Hyponatremia  1. Improved -- last check  WNL -- asx  11. Uncontrolled DM Type 2, HgbA1C 9.1  1. On Lantus 55 units bid, stable --> resumed at d/c along with pre-meal boluses. BS improved. 12. Chronic co-morbidities:  1. Essential HTN -- no current meds - BP stable  2.  Bipolar disorder -- cont home meds  3. History of R BKA  12. Normocytic anemia -- no signs of bleeding - ?due to blood loss from surgery as dropped from 12's to 10.6 post op - no further h/h done and pt asx and ready for d/c when pt seen. Jayce Wasserman was turned down by multiple ECF's for treatment as well as [place]. He was doing too well for inpatient rehab. Thus he was cleared for d/c by consultants. He was d/c home with [facility] RN for IV atbx infusion of rocephin daily and assistance with wound vac changes on M/W/F. He will f/u with Dr. Anthony Us for further atbx and wound care. Labs per him for d/c. Discharge Medications:   Adriano Becker   Home Medication Instructions IOV:[id number]    Printed on:04/10/19 9514   Medication Information                      acetaminophen (TYLENOL) 500 MG tablet  Take 1,000 mg by mouth every 6 hours as needed for Pain             blood glucose monitor strips  Accucheck Sheila Test Strips Test blood sugars 4 times daily DX:E11.65             Blood Glucose Monitoring Suppl LIANNE  Use to test blood sugars DX:E11.65             Continuous Blood Gluc  (FREESTYLE MARIANELA READER) LIANNE  1 Units by Does not apply route continuous             cyclobenzaprine (FLEXERIL) 10 MG tablet  Take 1 tablet by mouth 3 times daily as needed for Muscle spasms             HYDROcodone-acetaminophen (NORCO) 5-325 MG per tablet  Take 1-2 tablets by mouth every 4-6 hours as needed for Pain for up to 7 days.              insulin glargine (BASAGLAR KWIKPEN) 100 UNIT/ML injection pen  Inject 55 Units into the skin 2 times daily             insulin lispro (HUMALOG) 100 UNIT/ML injection vial  Inject 18 Units into the skin 3 times daily (before meals)             Insulin Pen Needle 32G X 4 MM MISC  1 each by Does not apply route daily             Insulin Syringe-Needle U-100 29G X 1/2\" 0.3 ML MISC  1 each by Does not apply route 4 times daily (after meals and at bedtime)             Lancets MISC  Use to test blood sugars 4 times daily DX:E11.65             mirtazapine (REMERON) 15 MG tablet  Take 15 mg by mouth nightly as needed             sertraline (ZOLOFT) 100 MG tablet  Take 1.5 tablets by mouth daily                 Patient Instructions:    Discharge lab work: per Dr. Bates Day  Activity: activity as tolerated  Diet: DIET CARB CONTROL; Carb Control: 5 carb choices (75 gms)/meal  Dietary Nutrition Supplements: Diabetic Oral Supplement    Code Status: Full Code    Follow-up visits:   Dmitriy Hicks, APRN - CNP  [id number] [id number] [address] [facility] [id number]  [address]  [phone]      post hospitalization for acute on chronic osteomyelitis       Procedures:   --  4/4/19 by Dr. Leslie Batista  Procedure(s):  I&D AND REVISION OF AMPUTATION RIGHT LEG        Consults:   PHARMACY TO DOSE VANCOMYCIN  STR ED TO IP CONSULT  IP CONSULT TO ORTHOPEDIC SURGERY  IP CONSULT TO SOCIAL WORK  IP CONSULT TO INFECTIOUS DISEASES  IP CONSULT TO PHYSICAL MEDICINE REHAB  IP CONSULT TO REHAB/TCU ADMISSION COORDINATOR  IP CONSULT TO HOME CARE NEEDS      Examination:  Vitals:  Vitals:    04/09/19 0800 04/09/19 1227 04/09/19 2130 04/10/19 0445   BP: 119/60 (!) 121/58 127/65 (!) 103/57   Pulse: 98 106 90 96   Resp: 18 16 16 14   Temp: 97.4 °F (36.3 °C) 98.4 °F (36.9 °C) 98.6 °F (37 °C) 98.4 °F (36.9 °C)   TempSrc: Oral Oral Oral Oral   SpO2:  97% 96% 93%   Weight:       Height:         Weight: Weight: 222 lb (100.7 kg)     24 hour intake/output:    Intake/Output Summary (Last 24 hours) at 4/10/2019 0858  Last data filed at 4/10/2019 0840  Gross per 24 hour   Intake 1250 ml   Output 1070 ml   Net 180 ml       General appearance - alert, well appearing, and in no distress and oriented to person, place, and time  Chest - clear to auscultation, no wheezes, rales or rhonchi, symmetric air entry, no tachypnea, retractions or cyanosis  Heart - normal rate, regular rhythm, normal S1, S2, no murmurs, rubs, clicks or gallops  Abdomen - soft, nontender, nondistended, no masses or organomegaly  bowel sounds normal  Obese: Yes; Protuberant: No   Neurological - alert, oriented, normal speech, no focal findings or movement disorder noted, cranial nerves II through XII intact  Extremities - right BKA with wound vac on lateral stump without surrounding induration. Moves all extremities. LLE w/o calf TT. Skin - normal coloration and turgor, no rashes, no suspicious skin lesions noted - wound vac lateral right stump w/o induration    Significant Diagnostics:   Radiology:   IR FLUORO GUIDED CVA DEVICE PLMT/REPLACE/REMOVAL   Final Result   Status post successful PICC line insertion. **This report has been created using voice recognition software. It may contain minor errors which are inherent in voice recognition technology. **      Final report electronically signed by Dr. Amber Tejeda on 4/9/2019 10:54 AM      MRI TIBULA FIBULA RIGHT WO CONTRAST   Final Result   Moderate osteomyelitis within the residual fibula adjacent to an ulcer and marked adjacent cellulitis and myositis. **This report has been created using voice recognition software. It may contain minor errors which are inherent in voice recognition technology. **      Final report electronically signed by Dr. Alphonse Clark on 4/3/2019 5:07 PM          Labs:   Recent Results (from the past 72 hour(s))   POCT glucose    Collection Time: 04/07/19 11:03 AM   Result Value Ref Range    POC Glucose 233 (H) 70 - 108 mg/dl   POCT glucose    Collection Time: 04/07/19  3:38 PM   Result Value Ref Range    POC Glucose 306 (H) 70 - 108 mg/dl   POCT glucose    Collection Time: 04/07/19  8:20 PM   Result Value Ref Range    POC Glucose 224 (H) 70 - 108 mg/dl   POCT glucose    Collection Time: 04/08/19  6:36 AM   Result Value Ref Range    POC Glucose 139 (H) 70 - 108 mg/dl   POCT glucose    Collection Time: 04/08/19 10:35 AM   Result Value Ref Range    POC Glucose 227 (H) 70 - 108 mg/dl   POCT glucose    Collection Time: 04/08/19  3:49 PM   Result Value Ref Range    POC Glucose 214 (H) 70 - 108 mg/dl   POCT glucose    Collection Time: 04/08/19  8:34 PM   Result Value Ref Range    POC Glucose 224 (H) 70 - 108 mg/dl   POCT glucose    Collection Time: 04/09/19  6:29 AM   Result Value Ref Range    POC Glucose 128 (H) 70 - 108 mg/dl   POCT glucose    Collection Time: 04/09/19 10:53 AM   Result Value Ref Range    POC Glucose 180 (H) 70 - 108 mg/dl   POCT glucose    Collection Time: 04/09/19  3:28 PM   Result Value Ref Range    POC Glucose 144 (H) 70 - 108 mg/dl   POCT glucose    Collection Time: 04/09/19  8:19 PM   Result Value Ref Range    POC Glucose 187 (H) 70 - 108 mg/dl   POCT glucose    Collection Time: 04/10/19  6:12 AM   Result Value Ref Range    POC Glucose 115 (H) 70 - 108 mg/dl       Discharge condition: stable  Disposition: Home with [address] [place] [address]  Time spent on discharge: 40 min        Electronically signed by Carliss Shone, MD on 4/10/2019 at 7:33 AM

## 2019-04-12 ENCOUNTER — HOSPITAL ENCOUNTER (EMERGENCY)
Age: 49
Discharge: HOME | End: 2019-04-12
Payer: MEDICARE

## 2019-04-12 VITALS
RESPIRATION RATE: 16 BRPM | HEART RATE: 95 BPM | HEIGHT: 66 IN | TEMPERATURE: 98.5 F | BODY MASS INDEX: 35.68 KG/M2 | OXYGEN SATURATION: 98 % | WEIGHT: 222 LBS | SYSTOLIC BLOOD PRESSURE: 118 MMHG | DIASTOLIC BLOOD PRESSURE: 71 MMHG

## 2019-04-12 DIAGNOSIS — T82.898A OCCLUSION OF PERIPHERALLY INSERTED CENTRAL CATHETER (PICC) LINE, INITIAL ENCOUNTER (HCC): Primary | ICD-10-CM

## 2019-04-12 PROCEDURE — 96374 THER/PROPH/DIAG INJ IV PUSH: CPT

## 2019-04-12 PROCEDURE — 6360000002 HC RX W HCPCS: Performed by: NURSE PRACTITIONER

## 2019-04-12 PROCEDURE — 36593 DECLOT VASCULAR DEVICE: CPT

## 2019-04-12 PROCEDURE — 99283 EMERGENCY DEPT VISIT LOW MDM: CPT

## 2019-04-12 RX ADMIN — ALTEPLASE 1 MG: 2.2 INJECTION, POWDER, LYOPHILIZED, FOR SOLUTION INTRAVENOUS at 14:53

## 2019-04-12 ASSESSMENT — PAIN DESCRIPTION - DESCRIPTORS: DESCRIPTORS: ACHING

## 2019-04-12 ASSESSMENT — ENCOUNTER SYMPTOMS
VOMITING: 0
NAUSEA: 0
SHORTNESS OF BREATH: 0
COLOR CHANGE: 0

## 2019-04-12 ASSESSMENT — PAIN DESCRIPTION - FREQUENCY: FREQUENCY: CONTINUOUS

## 2019-04-12 ASSESSMENT — PAIN DESCRIPTION - LOCATION: LOCATION: ARM

## 2019-04-12 ASSESSMENT — PAIN DESCRIPTION - ONSET: ONSET: GRADUAL

## 2019-04-12 ASSESSMENT — PAIN DESCRIPTION - ORIENTATION: ORIENTATION: RIGHT

## 2019-04-12 NOTE — ED NOTES
Latasha Lewis RN to assess picc line. Pt tolerating well. Vitals updated. Will continue to monitor for safety and comfort.       Meena Olvera RN  04/12/19 0970

## 2019-04-12 NOTE — ED PROVIDER NOTES
Essential hypertension, GERD (gastroesophageal reflux disease), Hammer toe of left foot, Heart murmur, History of tobacco abuse, Hx of BKA, right ([address] Macular edema, diabetic, bilateral ([address] Marijuana abuse in remission, Onychomycosis, and 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 SURGICAL HISTORY      has a past surgical history that includes other surgical history (Right, 1/14/15); Abscess Drainage (Right); Toe amputation (Right, 1/16/15); Foot Debridement (Right, 07/01/2016); Leg amputation below knee (Right, 07/20/2016); [place] tooth extraction (?when); pr drain infect shoulder bursa (Left, 8/18/2017); pr office/outpt visit,procedure only (Right, 9/20/2018); incision and drainage (Right, 2/18/2019); and Leg amputation below knee (Right, 4/4/2019).     CURRENT MEDICATIONS       Discharge Medication List as of 4/12/2019  3:04 PM      CONTINUE these medications which have NOT CHANGED    Details   cyclobenzaprine (FLEXERIL) 10 MG tablet Take 1 tablet by mouth 3 times daily as needed for Muscle spasms, Disp-40 tablet, R-0Normal      cefTRIAXone (ROCEPHIN) 1 g injection Infuse 2 g intravenously every 24 hours for 26 days, Disp-52 g, R-0Print      insulin lispro (HUMALOG) 100 UNIT/ML injection vial Inject 18 Units into the skin 3 times daily (before meals)Historical Med      mirtazapine (REMERON) 15 MG tablet Take 15 mg by mouth nightly as neededHistorical Med      insulin glargine (BASAGLAR KWIKPEN) 100 UNIT/ML injection pen Inject 55 Units into the skin 2 times dailyHistorical Med      Continuous Blood Gluc  (FREESTYLE MARIANELA READER) LIANNE 1 Units by Does not apply route continuous, Disp-1 Device, R-0Normal      blood glucose monitor strips Accucheck Sheila Test Strips Test blood sugars 4 times daily DX:E11.65, Disp-400 strip, R-5, Normal      acetaminophen (TYLENOL) 500 MG tablet Take 1,000 mg by mouth every 6 hours as needed for PainHistorical Med      Lancets MISC Disp-100 each, R-3, PrintUse to test blood sugars 4 times daily DX:E11.65      Insulin Syringe-Needle U-100 29G X 1/2\" 0.3 ML MISC 4 TIMES DAILY AFTER MEALS AND BEFORE BEDTIME Starting Thu 2018, Disp-100 each, R-1, Print      Insulin Pen Needle 32G X 4 MM MISC DAILY Starting Thu 2018, Disp-100 each, R-0, Print      sertraline (ZOLOFT) 100 MG tablet Take 1.5 tablets by mouth daily, Disp-45 tablet, R-0Normal      Blood Glucose Monitoring Suppl LIANNE Disp-1 Device, R-0, NormalUse to test blood sugars DX:E11.65             ALLERGIES     is allergic to pcn [penicillins] and clindamycin/lincomycin. FAMILY HISTORY     indicated that his mother is . He reported the following about his father: unknown. He indicated that the status of his maternal grandmother is unknown. He indicated that the status of his maternal grandfather is unknown.   family history includes Arthritis in his maternal grandfather; Depression in his mother; Diabetes in his mother; Early Death in his mother; Heart Disease in his maternal grandfather; High Blood Pressure in his mother; High Cholesterol in his mother; Other in his mother; Vision Loss in his maternal grandmother. SOCIALHISTORY      reports that he quit smoking about 34 years ago. His smoking use included cigars. He has a 65.00 pack-year smoking history. He has never used smokeless tobacco. He reports that he drank alcohol. He reports that he does not use drugs. PHYSICAL EXAM     INITIAL VITALS:  height is 5' 6\" (1.676 m) and weight is 222 lb (100.7 kg). His oral temperature is 98.5 °F (36.9 °C). His blood pressure is 118/71 and his pulse is 95. His respiration is 16 and oxygen saturation is 98%. Physical Exam   Constitutional: He appears well-developed and well-nourished. HENT:   Head: Atraumatic. Eyes: EOM are normal.   Neck: Normal range of motion. Pulmonary/Chest: No respiratory distress. Musculoskeletal: Normal range of motion.    PICC line in RUE with blood present in the tubing. Unable to be flushed at bedside. Right BKA. Neurological: He is alert. Skin: Skin is warm and dry. Nursing note and vitals reviewed. DIFFERENTIAL DIAGNOSIS:   PICC line dysfunction     DIAGNOSTIC RESULTS     RADIOLOGY: non-plain film images(s) such as CT, Ultrasound and MRI are read by the radiologist.  Plain radiographic images are visualized andpreliminarily interpreted by the emergencyphysician unless otherwise stated below. No orders to display         LABS:   Labs Reviewed - No data to display    EMERGENCYDEPARTMENT COURSE:   Vitals:    Vitals:    04/12/19 1347 04/12/19 1454   BP: 129/77 118/71   Pulse: 96 95   Resp: 18 16   Temp: 98.5 °F (36.9 °C)    TempSrc: Oral    SpO2: 100% 98%   Weight: 222 lb (100.7 kg)    Height: 5' 6\" (1.676 m)        MDM    Medications   alteplase (CATHFLO) injection 1 mg (1 mg Intracatheter Given 4/12/19 1453)     Patient was seen and evaluated in the ED for a PICC line issue. Patient presented to the ED in no acute distress. PICC line unable to be flushed by nursing staff. Cathflo ordered and administered by nursing staff. PICC was able to be flushed after the cathflo. Patient discharged home in st able condition. He was instructed to follow up with his PCP and ID specialist as needed. The patient was amenable with all discharge and follow up instructions. All questions were addressed and answered. Return precautions were given for new or worsening symptoms. Patient was seen independently by myself. The patient's final impression and disposition and plan was determined by myself. CRITICAL CARE:   None    CONSULTS:  None    PROCEDURES:  None    FINAL IMPRESSION      1.  Occlusion of peripherally inserted central catheter (PICC) line, initial encounter [facility])          DISPOSITION/PLAN   Discharged     PATIENT REFERRED TO:  [facility] [address] [address] EMERGENCY DEPT  [address] [address]  [phone]  Go to   If symptoms

## 2019-04-12 NOTE — ED NOTES
Pharmacy called for update on medication; rprts medication will be hand deliver to [facility].       Erika Conner RN  04/12/19 0154

## 2019-04-12 NOTE — ED TRIAGE NOTES
Pt to ED for the evaluation of picc line; clotted off following administration of rocephin iv. Pt rprs arm pain at the site; rates 6/10. No redness or swelling noted.

## 2019-04-15 ENCOUNTER — HOSPITAL ENCOUNTER (OUTPATIENT)
Age: 49
Setting detail: SPECIMEN
Discharge: HOME | DRG: 475 | End: 2019-04-15
Payer: MEDICARE

## 2019-04-15 ENCOUNTER — NURSE TRIAGE (OUTPATIENT)
Dept: ADMINISTRATIVE | Age: 49
End: 2019-04-15

## 2019-04-15 LAB
ALBUMIN SERPL-MCNC: 3.7 G/DL (ref 3.5–5.1)
ALP BLD-CCNC: 89 U/L (ref 38–126)
ALT SERPL-CCNC: 6 U/L (ref 11–66)
ANION GAP SERPL CALCULATED.3IONS-SCNC: 13 MEQ/L (ref 8–16)
AST SERPL-CCNC: 12 U/L (ref 5–40)
BASOPHILS # BLD: 0.7 %
BASOPHILS ABSOLUTE: 0.1 THOU/MM3 (ref 0–0.1)
BILIRUB SERPL-MCNC: 0.2 MG/DL (ref 0.3–1.2)
BILIRUBIN DIRECT: < 0.2 MG/DL (ref 0–0.3)
BUN BLDV-MCNC: 11 MG/DL (ref 7–22)
C-REACTIVE PROTEIN: 2.69 MG/DL (ref 0–1)
CALCIUM SERPL-MCNC: 9.1 MG/DL (ref 8.5–10.5)
CHLORIDE BLD-SCNC: 93 MEQ/L (ref 98–111)
CO2: 25 MEQ/L (ref 23–33)
CREAT SERPL-MCNC: 0.8 MG/DL (ref 0.4–1.2)
EOSINOPHIL # BLD: 3.6 %
EOSINOPHILS ABSOLUTE: 0.4 THOU/MM3 (ref 0–0.4)
ERYTHROCYTE [DISTWIDTH] IN BLOOD BY AUTOMATED COUNT: 15.3 % (ref 11.5–14.5)
ERYTHROCYTE [DISTWIDTH] IN BLOOD BY AUTOMATED COUNT: 46.2 FL (ref 35–45)
GFR SERPL CREATININE-BSD FRML MDRD: > 90 ML/MIN/1.73M2
GLUCOSE BLD-MCNC: 377 MG/DL (ref 70–108)
HCT VFR BLD CALC: 36.4 % (ref 42–52)
HEMOGLOBIN: 12.2 GM/DL (ref 14–18)
IMMATURE GRANS (ABS): 0.1 THOU/MM3 (ref 0–0.07)
IMMATURE GRANULOCYTES: 0.8 %
LYMPHOCYTES # BLD: 22.2 %
LYMPHOCYTES ABSOLUTE: 2.6 THOU/MM3 (ref 1–4.8)
MCH RBC QN AUTO: 28.4 PG (ref 26–33)
MCHC RBC AUTO-ENTMCNC: 33.5 GM/DL (ref 32.2–35.5)
MCV RBC AUTO: 84.7 FL (ref 80–94)
MONOCYTES # BLD: 6.4 %
MONOCYTES ABSOLUTE: 0.8 THOU/MM3 (ref 0.4–1.3)
NUCLEATED RED BLOOD CELLS: 0 /100 WBC
PLATELET # BLD: 464 THOU/MM3 (ref 130–400)
PMV BLD AUTO: 9 FL (ref 9.4–12.4)
POTASSIUM SERPL-SCNC: 4.1 MEQ/L (ref 3.5–5.2)
RBC # BLD: 4.3 MILL/MM3 (ref 4.7–6.1)
SEG NEUTROPHILS: 66.3 %
SEGMENTED NEUTROPHILS ABSOLUTE COUNT: 7.8 THOU/MM3 (ref 1.8–7.7)
SODIUM BLD-SCNC: 131 MEQ/L (ref 135–145)
TOTAL PROTEIN: 8 G/DL (ref 6.1–8)
WBC # BLD: 11.8 THOU/MM3 (ref 4.8–10.8)

## 2019-04-15 PROCEDURE — 82248 BILIRUBIN DIRECT: CPT

## 2019-04-15 PROCEDURE — 85025 COMPLETE CBC W/AUTO DIFF WBC: CPT

## 2019-04-15 PROCEDURE — 86140 C-REACTIVE PROTEIN: CPT

## 2019-04-15 PROCEDURE — 80053 COMPREHEN METABOLIC PANEL: CPT

## 2019-04-16 ENCOUNTER — APPOINTMENT (OUTPATIENT)
Dept: GENERAL RADIOLOGY | Age: 49
DRG: 475 | End: 2019-04-16
Payer: MEDICARE

## 2019-04-16 ENCOUNTER — HOSPITAL ENCOUNTER (EMERGENCY)
Age: 49
Discharge: HOME | DRG: 475 | End: 2019-04-16
Attending: EMERGENCY MEDICINE
Payer: MEDICARE

## 2019-04-16 VITALS
DIASTOLIC BLOOD PRESSURE: 63 MMHG | TEMPERATURE: 98.2 F | OXYGEN SATURATION: 97 % | BODY MASS INDEX: 35.83 KG/M2 | SYSTOLIC BLOOD PRESSURE: 126 MMHG | RESPIRATION RATE: 19 BRPM | HEART RATE: 106 BPM | WEIGHT: 222 LBS

## 2019-04-16 DIAGNOSIS — L02.419 CELLULITIS AND ABSCESS OF LEG: ICD-10-CM

## 2019-04-16 DIAGNOSIS — Z89.511 HX OF RIGHT BKA (HCC): ICD-10-CM

## 2019-04-16 DIAGNOSIS — Z87.39 HISTORY OF OSTEOMYELITIS: Primary | ICD-10-CM

## 2019-04-16 DIAGNOSIS — T87.43 RIGHT BKA INFECTION (HCC): ICD-10-CM

## 2019-04-16 DIAGNOSIS — L03.119 CELLULITIS AND ABSCESS OF LEG: ICD-10-CM

## 2019-04-16 LAB
ALBUMIN SERPL-MCNC: 3.4 G/DL (ref 3.5–5.1)
ALP BLD-CCNC: 88 U/L (ref 38–126)
ALT SERPL-CCNC: 6 U/L (ref 11–66)
AMPHETAMINE+METHAMPHETAMINE URINE SCREEN: NEGATIVE
ANION GAP SERPL CALCULATED.3IONS-SCNC: 12 MEQ/L (ref 8–16)
AST SERPL-CCNC: 10 U/L (ref 5–40)
BACTERIA: ABNORMAL
BARBITURATE QUANTITATIVE URINE: NEGATIVE
BASOPHILS # BLD: 0.5 %
BASOPHILS ABSOLUTE: 0.1 THOU/MM3 (ref 0–0.1)
BENZODIAZEPINE QUANTITATIVE URINE: NEGATIVE
BILIRUB SERPL-MCNC: 0.3 MG/DL (ref 0.3–1.2)
BILIRUBIN URINE: NEGATIVE
BLOOD, URINE: NEGATIVE
BUN BLDV-MCNC: 12 MG/DL (ref 7–22)
CALCIUM SERPL-MCNC: 9.1 MG/DL (ref 8.5–10.5)
CANNABINOID QUANTITATIVE URINE: NEGATIVE
CASTS: ABNORMAL /LPF
CASTS: ABNORMAL /LPF
CHARACTER, URINE: CLEAR
CHLORIDE BLD-SCNC: 95 MEQ/L (ref 98–111)
CO2: 24 MEQ/L (ref 23–33)
COCAINE METABOLITE QUANTITATIVE URINE: NEGATIVE
COLOR: YELLOW
CREAT SERPL-MCNC: 0.8 MG/DL (ref 0.4–1.2)
CRYSTALS: ABNORMAL
EOSINOPHIL # BLD: 2.7 %
EOSINOPHILS ABSOLUTE: 0.4 THOU/MM3 (ref 0–0.4)
EPITHELIAL CELLS, UA: ABNORMAL /HPF
ERYTHROCYTE [DISTWIDTH] IN BLOOD BY AUTOMATED COUNT: 15 % (ref 11.5–14.5)
ERYTHROCYTE [DISTWIDTH] IN BLOOD BY AUTOMATED COUNT: 45.4 FL (ref 35–45)
ETHYL ALCOHOL, SERUM: < 0.01 %
GFR SERPL CREATININE-BSD FRML MDRD: > 90 ML/MIN/1.73M2
GLUCOSE BLD-MCNC: 353 MG/DL (ref 70–108)
GLUCOSE, URINE: >= 1000 MG/DL
HCT VFR BLD CALC: 33.9 % (ref 42–52)
HEMOGLOBIN: 11.7 GM/DL (ref 14–18)
IMMATURE GRANS (ABS): 0.12 THOU/MM3 (ref 0–0.07)
IMMATURE GRANULOCYTES: 0.9 %
KETONES, URINE: NEGATIVE
LEUKOCYTE ESTERASE, URINE: NEGATIVE
LYMPHOCYTES # BLD: 19.9 %
LYMPHOCYTES ABSOLUTE: 2.6 THOU/MM3 (ref 1–4.8)
MAGNESIUM: 1.8 MG/DL (ref 1.6–2.4)
MCH RBC QN AUTO: 28.8 PG (ref 26–33)
MCHC RBC AUTO-ENTMCNC: 34.5 GM/DL (ref 32.2–35.5)
MCV RBC AUTO: 83.5 FL (ref 80–94)
MISCELLANEOUS LAB TEST RESULT: ABNORMAL
MONOCYTES # BLD: 8.1 %
MONOCYTES ABSOLUTE: 1.1 THOU/MM3 (ref 0.4–1.3)
NITRITE, URINE: NEGATIVE
NUCLEATED RED BLOOD CELLS: 0 /100 WBC
OPIATES, URINE: NEGATIVE
OSMOLALITY CALCULATION: 276.6 MOSMOL/KG (ref 275–300)
OXYCODONE: NEGATIVE
PH UA: 5.5 (ref 5–9)
PHENCYCLIDINE QUANTITATIVE URINE: NEGATIVE
PLATELET # BLD: 418 THOU/MM3 (ref 130–400)
PMV BLD AUTO: 8.8 FL (ref 9.4–12.4)
POTASSIUM SERPL-SCNC: 4.3 MEQ/L (ref 3.5–5.2)
PROTEIN UA: NEGATIVE MG/DL
RBC # BLD: 4.06 MILL/MM3 (ref 4.7–6.1)
RBC URINE: ABNORMAL /HPF
RENAL EPITHELIAL, UA: ABNORMAL
SEG NEUTROPHILS: 67.9 %
SEGMENTED NEUTROPHILS ABSOLUTE COUNT: 8.9 THOU/MM3 (ref 1.8–7.7)
SODIUM BLD-SCNC: 131 MEQ/L (ref 135–145)
SPECIFIC GRAVITY UA: 1.03 (ref 1–1.03)
TOTAL PROTEIN: 7.7 G/DL (ref 6.1–8)
UROBILINOGEN, URINE: 0.2 EU/DL (ref 0–1)
WBC # BLD: 13.1 THOU/MM3 (ref 4.8–10.8)
WBC UA: ABNORMAL /HPF
YEAST: ABNORMAL

## 2019-04-16 PROCEDURE — 80053 COMPREHEN METABOLIC PANEL: CPT

## 2019-04-16 PROCEDURE — 80307 DRUG TEST PRSMV CHEM ANLYZR: CPT

## 2019-04-16 PROCEDURE — 36415 COLL VENOUS BLD VENIPUNCTURE: CPT

## 2019-04-16 PROCEDURE — 96374 THER/PROPH/DIAG INJ IV PUSH: CPT

## 2019-04-16 PROCEDURE — 85025 COMPLETE CBC W/AUTO DIFF WBC: CPT

## 2019-04-16 PROCEDURE — G0480 DRUG TEST DEF 1-7 CLASSES: HCPCS

## 2019-04-16 PROCEDURE — 87040 BLOOD CULTURE FOR BACTERIA: CPT

## 2019-04-16 PROCEDURE — 83735 ASSAY OF MAGNESIUM: CPT

## 2019-04-16 PROCEDURE — 73552 X-RAY EXAM OF FEMUR 2/>: CPT

## 2019-04-16 PROCEDURE — 6360000002 HC RX W HCPCS: Performed by: EMERGENCY MEDICINE

## 2019-04-16 PROCEDURE — 81001 URINALYSIS AUTO W/SCOPE: CPT

## 2019-04-16 PROCEDURE — 73060 X-RAY EXAM OF HUMERUS: CPT

## 2019-04-16 PROCEDURE — 99284 EMERGENCY DEPT VISIT MOD MDM: CPT

## 2019-04-16 PROCEDURE — 71046 X-RAY EXAM CHEST 2 VIEWS: CPT

## 2019-04-16 RX ORDER — IBUPROFEN 600 MG/1
600 TABLET ORAL EVERY 6 HOURS PRN
Qty: 40 TABLET | Refills: 0 | Status: ON HOLD | OUTPATIENT
Start: 2019-04-16 | End: 2019-04-18

## 2019-04-16 RX ORDER — HYDROCODONE BITARTRATE AND ACETAMINOPHEN 5; 325 MG/1; MG/1
1 TABLET ORAL EVERY 6 HOURS PRN
Qty: 12 TABLET | Refills: 0 | Status: ON HOLD | OUTPATIENT
Start: 2019-04-16 | End: 2019-04-18 | Stop reason: HOSPADM

## 2019-04-16 RX ORDER — KETOROLAC TROMETHAMINE 30 MG/ML
15 INJECTION, SOLUTION INTRAMUSCULAR; INTRAVENOUS ONCE
Status: COMPLETED | OUTPATIENT
Start: 2019-04-16 | End: 2019-04-16

## 2019-04-16 RX ADMIN — KETOROLAC TROMETHAMINE 15 MG: 30 INJECTION, SOLUTION INTRAMUSCULAR at 03:58

## 2019-04-16 ASSESSMENT — ENCOUNTER SYMPTOMS
CHOKING: 0
CONSTIPATION: 0
PHOTOPHOBIA: 0
WHEEZING: 0
VOMITING: 0
TROUBLE SWALLOWING: 0
SHORTNESS OF BREATH: 0
SINUS PRESSURE: 0
CHEST TIGHTNESS: 0
EYE ITCHING: 0
ABDOMINAL PAIN: 0
BACK PAIN: 0
RHINORRHEA: 0
DIARRHEA: 0
SORE THROAT: 0
COUGH: 0
EYE REDNESS: 0
NAUSEA: 0
BLOOD IN STOOL: 0
VOICE CHANGE: 0
EYE DISCHARGE: 0
EYE PAIN: 0
ABDOMINAL DISTENTION: 0

## 2019-04-16 ASSESSMENT — PAIN SCALES - GENERAL
PAINLEVEL_OUTOF10: 4
PAINLEVEL_OUTOF10: 8

## 2019-04-16 NOTE — TELEPHONE ENCOUNTER
well and urinating well  9. EXPOSURE: \"Have you traveled to a foreign country recently? \" \"Have you been exposed to anyone with diarrhea? \" \"Could you have eaten any food that was spoiled? \"      no  10. ANTIBIOTIC USE: \"Are you taking antibiotics now or have you taken antibiotics in the past 2 months? \"        Has had I and D of wound, s/p of amputation  11. OTHER SYMPTOMS: \"Do you have any other symptoms? \" (e.g., fever, blood in stool)        S/p amputation, yellow drainage with wound vac, more fluid than he has ever seen come out, blood sugar 377  12. PREGNANCY: \"Is there any chance you are pregnant? \" \"When was your last menstrual period? \"        male    Answer Assessment - Initial Assessment Questions  1. BLOOD GLUCOSE: \"What is your blood glucose level? \"       377  2. ONSET: \"When did you check the blood glucose? \"      today  3. USUAL RANGE: \"What is your glucose level usually? \" (e.g., usual fasting morning value, usual evening value)      Has been higher since I and D, and amputation  4. KETONES: \"Do you check for ketones (urine or blood test strips)? \" If yes, ask: \"What does the test show now? \"       Does not check them  5. TYPE 1 or 2:  \"Do you know what type of diabetes you have? \"  (e.g., Type 1, Type 2, Gestational; doesn't know)       2  6. INSULIN: \"Do you take insulin? \" If yes, ask: \"Have you missed any shots recently? \"     Uses humalog  7. DIABETES PILLS: \"Do you take any pills for your diabetes? \" If yes, ask: \"Have you missed taking any pills recently? \"      No changes  8. OTHER SYMPTOMS: \"Do you have any symptoms? \" (e.g., fever, frequent urination, difficulty breathing, dizziness, weakness, vomiting)      Complicated health history  9. PREGNANCY: \"Is there any chance you are pregnant? \" \"When was your last menstrual period? \"      male    Protocols used: DIARRHEA-ADULT-, DIABETES - HIGH BLOOD SUGAR-ADULT-

## 2019-04-16 NOTE — ED PROVIDER NOTES
[facility]  eMERGENCY dEPARTMENT eNCOUnter          CHIEF COMPLAINT       Chief Complaint   Patient presents with    Other     abnormal labs       Nurses Notes reviewed and I agreeexcept as noted in the HPI. HISTORY OF PRESENT ILLNESS    Jordan Quiroz is a 44 y.o. male who presents to the Emergency Department for the evaluation of pain in the stump of his amputated right lower extremity. The patient states that his home health nurse informed him of an elevated WBC and suggested being evaluated. The patient called \"call a nurse\" who instructed him to come to the ED for evaluation. The patient has been being treated with IV rocephin through a PICC line for cellulitis for the past 2 weeks. The patient has a wound vacuum to the amputated stump of the right leg where his cellulitis has been present. The patient denies any fever but does report an elevated temperature of 99.9 degrees farenheit. The patient denies any other signs or symptoms at this time. The patient follows with Dr. Bishop Tavares, infectious disease specialist. He has a history of a right below the knee amputation. The HPI was provided by the patient. REVIEW OF SYSTEMS      Review of Systems   Constitutional: Negative for activity change, appetite change, diaphoresis, fatigue and unexpected weight change. HENT: Negative for congestion, ear discharge, ear pain, hearing loss, rhinorrhea, sinus pressure, sore throat, trouble swallowing and voice change. Eyes: Negative for photophobia, pain, discharge, redness and itching. Respiratory: Negative for cough, choking, chest tightness, shortness of breath and wheezing. Cardiovascular: Negative for chest pain, palpitations and leg swelling. Gastrointestinal: Negative for abdominal distention, abdominal pain, blood in stool, constipation, diarrhea, nausea and vomiting. Endocrine: Negative for polydipsia, polyphagia and polyuria.    Genitourinary: Negative for decreased urine volume, difficulty urinating, dysuria, enuresis, frequency, hematuria and urgency. Musculoskeletal: Positive for arthralgias (pain to the amputated stump of the right hand). Negative for back pain, gait problem, myalgias, neck pain and neck stiffness. Skin: Negative for pallor and rash. Allergic/Immunologic: Negative for immunocompromised state. Neurological: Negative for dizziness, tremors, seizures, syncope, facial asymmetry, weakness, light-headedness, numbness and headaches. Hematological: Negative for adenopathy. Does not bruise/bleed easily. Psychiatric/Behavioral: Negative for agitation, hallucinations and suicidal ideas. The patient is not nervous/anxious. PAST MEDICAL HISTORY    has a past medical history of Bipolar 2 disorder ([address] Diabetes mellitus type 2, uncontrolled ([address] Diabetic foot ulcer ([address] Diabetic polyneuropathy ([address] Diabetic ulcer of right foot associated with type 2 diabetes mellitus ([address] Essential hypertension, GERD (gastroesophageal reflux disease), Hammer toe of left foot, Heart murmur, History of tobacco abuse, Hx of BKA, right ([address] Macular edema, diabetic, bilateral ([address] Marijuana abuse in remission, Onychomycosis, and 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 SURGICAL HISTORY      has a past surgical history that includes other surgical history (Right, 1/14/15); Abscess Drainage (Right); Toe amputation (Right, 1/16/15); Foot Debridement (Right, 07/01/2016); Leg amputation below knee (Right, 07/20/2016); [place] tooth extraction (?when); pr drain infect shoulder bursa (Left, 8/18/2017); pr office/outpt visit,procedure only (Right, 9/20/2018); incision and drainage (Right, 2/18/2019); and Leg amputation below knee (Right, 4/4/2019).     CURRENT MEDICATIONS       Discharge Medication List as of 4/16/2019  5:20 AM      CONTINUE these medications which have NOT CHANGED    Details   cyclobenzaprine (FLEXERIL) 10 MG tablet Take 1 tablet by mouth 3 times daily as needed for Muscle spasms, Disp-40 tablet, R-0Normal      cefTRIAXone (ROCEPHIN) 1 g injection Infuse 2 g intravenously every 24 hours for 26 days, Disp-52 g, R-0Print      insulin lispro (HUMALOG) 100 UNIT/ML injection vial Inject 18 Units into the skin 3 times daily (before meals)Historical Med      mirtazapine (REMERON) 15 MG tablet Take 15 mg by mouth nightly as neededHistorical Med      insulin glargine (BASAGLAR KWIKPEN) 100 UNIT/ML injection pen Inject 55 Units into the skin 2 times dailyHistorical Med      Continuous Blood Gluc  (FREESTYLE MARIANELA READER) LIANNE 1 Units by Does not apply route continuous, Disp-1 Device, R-0Normal      blood glucose monitor strips Accucheck Sheila Test Strips Test blood sugars 4 times daily DX:E11.65, Disp-400 strip, R-5, Normal      acetaminophen (TYLENOL) 500 MG tablet Take 1,000 mg by mouth every 6 hours as needed for PainHistorical Med      Lancets MISC Disp-100 each, R-3, PrintUse to test blood sugars 4 times daily DX:E11.65      Insulin Syringe-Needle U-100 29G X 1/2\" 0.3 ML MISC 4 TIMES DAILY AFTER MEALS AND BEFORE BEDTIME Starting Thu 2018, Disp-100 each, R-1, Print      Insulin Pen Needle 32G X 4 MM MISC DAILY Starting u 2018, Disp-100 each, R-0, Print      sertraline (ZOLOFT) 100 MG tablet Take 1.5 tablets by mouth daily, Disp-45 tablet, R-0Normal      Blood Glucose Monitoring Suppl LIANNE Disp-1 Device, R-0, NormalUse to test blood sugars DX:E11.65             ALLERGIES     is allergic to pcn [penicillins] and clindamycin/lincomycin. FAMILY HISTORY     indicated that his mother is . He reported the following about his father: unknown. He indicated that the status of his maternal grandmother is unknown.  He indicated that the status of his maternal grandfather is unknown.   family history includes Arthritis in his maternal grandfather; Depression in his mother; Diabetes in his mother; Early Death in his mother; recognition software. It may contain minor errors which are inherent in voice recognition technology. **      Final report electronically signed by Dr. Porsche Canales on 4/16/2019 4:21 AM          LABS:   Labs Reviewed   CBC WITH AUTO DIFFERENTIAL - Abnormal; Notable for the following components:       Result Value    WBC 13.1 (*)     RBC 4.06 (*)     Hemoglobin 11.7 (*)     Hematocrit 33.9 (*)     RDW-CV 15.0 (*)     RDW-SD 45.4 (*)     Platelets 416 (*)     MPV 8.8 (*)     Segs Absolute 8.9 (*)     Immature Grans (Abs) 0.12 (*)     All other components within normal limits   COMPREHENSIVE METABOLIC PANEL - Abnormal; Notable for the following components:    Glucose 353 (*)     Sodium 131 (*)     Chloride 95 (*)     Alb 3.4 (*)     ALT 6 (*)     All other components within normal limits   URINALYSIS WITH MICROSCOPIC - Abnormal; Notable for the following components:    Glucose, Urine >= 1000 (*)     All other components within normal limits   CULTURE BLOOD #1   CULTURE BLOOD #2   MAGNESIUM   ETHANOL   URINE DRUG SCREEN   ANION GAP   GLOMERULAR FILTRATION RATE, ESTIMATED   OSMOLALITY       EMERGENCY DEPARTMENT COURSE:   Vitals:    Vitals:    04/16/19 0303 04/16/19 0601   BP: (!) 146/75 126/63   Pulse: 105 106   Resp: 17 19   Temp: 98.2 °F (36.8 °C)    TempSrc: Oral    SpO2: 100% 97%   Weight: 222 lb (100.7 kg)      3:12 AM: The patient was seen andevaluated. Appropriate labs were ordered and reviewed. I discussed the case with  [facility], infectious disease specialist who recommended obtaining blood cultures and sending the patient back to his rehab facility. The patient was seen and evaluated in a timely manner for right lower extremity pain. His vital signs were stable with some hypertension and slight tachycardia noted. During the physical exam I noted the patient has pain without erythema to the lateral stump of his amputated right leg.  The patient has a wound vacuum in place and a PICC line in the right

## 2019-04-17 ENCOUNTER — HOSPITAL ENCOUNTER (OUTPATIENT)
Dept: WOUND CARE | Age: 49
Discharge: HOME | DRG: 475 | End: 2019-04-17
Payer: MEDICARE

## 2019-04-17 VITALS
OXYGEN SATURATION: 98 % | DIASTOLIC BLOOD PRESSURE: 79 MMHG | SYSTOLIC BLOOD PRESSURE: 143 MMHG | RESPIRATION RATE: 16 BRPM | TEMPERATURE: 98.4 F | HEART RATE: 109 BPM

## 2019-04-17 DIAGNOSIS — T87.9 BKA STUMP COMPLICATION (HCC): ICD-10-CM

## 2019-04-17 DIAGNOSIS — T81.42XA DEEP POSTOPERATIVE WOUND INFECTION: Primary | ICD-10-CM

## 2019-04-17 PROCEDURE — 99212 OFFICE O/P EST SF 10 MIN: CPT

## 2019-04-17 ASSESSMENT — PAIN DESCRIPTION - ORIENTATION: ORIENTATION: RIGHT

## 2019-04-17 ASSESSMENT — PAIN DESCRIPTION - FREQUENCY: FREQUENCY: CONTINUOUS

## 2019-04-17 ASSESSMENT — PAIN DESCRIPTION - LOCATION: LOCATION: LEG

## 2019-04-17 ASSESSMENT — PAIN DESCRIPTION - PAIN TYPE: TYPE: ACUTE PAIN

## 2019-04-17 ASSESSMENT — PAIN SCALES - GENERAL: PAINLEVEL_OUTOF10: 6

## 2019-04-17 ASSESSMENT — PAIN DESCRIPTION - DESCRIPTORS: DESCRIPTORS: ACHING

## 2019-04-17 NOTE — PROGRESS NOTES
Past Surgical History:   Procedure Laterality Date    ABSCESS DRAINAGE Right     foot    FOOT DEBRIDEMENT Right 2016    I & D    INCISION AND DRAINAGE Right 2019    I&D RIGHT STUMP performed by Elio Shaffer MD at [address] [facility] Right 2016    LEG AMPUTATION BELOW KNEE Right 2019    I&D AND REVISION OF AMPUTATION RIGHT LEG performed by Elio Shaffer MD at [address] [facility] Right 1/14/15    sole of foot I&D    [place] DRAIN INFECT SHOULDER BURSA Left 2017    LEFT SHOULDER INCISION AND DRAINAGE performed by Elio Shaffer MD at [address] [facility] OFFICE/OUTPT [facility] Right 2018    EXCISIONAL DEBRIDEMENT RIGHT BKA STUMP performed by Kalpana Cunningham MD at [address] Right 1/16/15    2nd toe with wound vac applied    WISDOM TOOTH EXTRACTION  ? when     FAMILY HISTORY         Family History   Problem Relation Age of Onset    Diabetes Mother     Other Mother         pneumonia, H1N1    Depression Mother     Early Death Mother     High Blood Pressure Mother     High Cholesterol Mother     Vision Loss Maternal Grandmother     Arthritis Maternal Grandfather     Heart Disease Maternal Grandfather      SOCIAL HISTORY       Social History     Tobacco Use    Smoking status: Former Smoker     Packs/day: 5.00     Years: 13.00     Pack years: 65.00     Types: Cigars     Last attempt to quit:      Years since quittin.3    Smokeless tobacco: Never Used   Substance Use Topics    Alcohol use: Not Currently     Alcohol/week: 0.0 oz    Drug use: No     Comment: as a teenager only     ALLERGIES         Allergies   Allergen Reactions    Pcn [Penicillins] Shortness Of Breath, Nausea And Vomiting and Other (See Comments)     Does not remember reactions. Has TOLERATED amoxicillin and several different cephalosporins.      Clindamycin/Lincomycin Itching     MEDICATIONS         Current Outpatient Medications on File Prior to Encounter   Medication Sig Dispense Refill    HYDROcodone-acetaminophen (NORCO) 5-325 MG per tablet Take 1 tablet by mouth every 6 hours as needed for Pain for up to 3 days. 12 tablet 0    ibuprofen (IBU) 600 MG tablet Take 1 tablet by mouth every 6 hours as needed for Pain 40 tablet 0    cyclobenzaprine (FLEXERIL) 10 MG tablet Take 1 tablet by mouth 3 times daily as needed for Muscle spasms 40 tablet 0    cefTRIAXone (ROCEPHIN) 1 g injection Infuse 2 g intravenously every 24 hours for 26 days 52 g 0    insulin lispro (HUMALOG) 100 UNIT/ML injection vial Inject 18 Units into the skin 3 times daily (before meals)      mirtazapine (REMERON) 15 MG tablet Take 15 mg by mouth nightly as needed      insulin glargine (BASAGLAR KWIKPEN) 100 UNIT/ML injection pen Inject 55 Units into the skin 2 times daily      Continuous Blood Gluc  (FREESTYLE MARIANELA READER) LIANNE 1 Units by Does not apply route continuous 1 Device 0    blood glucose monitor strips Accucheck Sheila Test Strips Test blood sugars 4 times daily DX:E11.65 400 strip 5    acetaminophen (TYLENOL) 500 MG tablet Take 1,000 mg by mouth every 6 hours as needed for Pain      Lancets MISC Use to test blood sugars 4 times daily DX:E11.65 100 each 3    Insulin Syringe-Needle U-100 29G X 1/2\" 0.3 ML MISC 1 each by Does not apply route 4 times daily (after meals and at bedtime) 100 each 1    Insulin Pen Needle 32G X 4 MM MISC 1 each by Does not apply route daily 100 each 0    sertraline (ZOLOFT) 100 MG tablet Take 1.5 tablets by mouth daily 45 tablet 0    Blood Glucose Monitoring Suppl LIANNE Use to test blood sugars DX:E11.65 1 Device 0     No current facility-administered medications on file prior to encounter. REVIEW OF SYSTEMS:     Constitutional: no fever, no night sweats, no fatigue. Head: no head ache , no head injury, no migranes. Eye: no blurring of vision, no double vision.   Ears: no hearing difficulty, no tinnitus  Mouth/throat: no ulceration, dental caries , dysphagia  Lungs: no cough, no shortness of breath, no wheeze  CVS: no palpitation, no chest pain, no shortness of breath  GI: no abdominal pain, no nausea , no vomiting, no constipation  ISMAEL: no dysuria, frequency and urgency, no hematuria, no kidney stones  Musculoskeletal: As noted in HPI.,  Endocrine: no polyuria, polydipsia, no cold or heat intolerance  Hematology: no anemia, no easy brusing or bleeding, no hx of clotting disorder  Dermatology: no skin rash, no eczema, no pruritis,  Psychiatry: History of bipolar disorder    PHYSICAL EXAM      oral temperature is 98.4 °F (36.9 °C). His blood pressure is 143/79 (abnormal) and his pulse is 109. His respiration is 16 and oxygen saturation is 98%. Wt Readings from Last 3 Encounters:   04/16/19 222 lb (100.7 kg)   04/12/19 222 lb (100.7 kg)   04/02/19 222 lb (100.7 kg)       General Appearance  Awake, alert, oriented,  not  In acute distress  HEENT - normocephalic, atraumatic, pink conjunctiva,  anicteric sclera  Neck - Supple, no mass  Lungs -  Bilateral good air entry, no rhonchi, no wheeze  Cardiovascular - Heart sounds are normal.    Abdomen - soft, not distended, nontender, no organomegally,  Neurologic -  oriented  Skin - No bruising or bleeding  Extremities - right below knee amputation. There is a full-thickness open wound on the lateral aspect of the stump there was jellylike material draining from the wound there was fibrous tissue covering the wound bed. There was no redness or swelling on the stump      Negative Pressure Wound Therapy Leg Right;Lateral (Active)   Wound Type Surgical 4/10/2019  2:00 PM   Dressing Type Black foam 4/10/2019  2:00 PM   Cycle Continuous 4/10/2019  2:00 PM   Target Pressure (mmHg) 125 4/10/2019  2:00 PM   Canister changed? No 4/8/2019 11:10 PM   Dressing Status Clean;Dry; Intact 4/10/2019  2:00 PM   Drainage Amount Scant 4/6/2019  4:30 PM   Output (ml) 0 ml AM   Wound Volume (cm^3) 114 cm^3 4/17/2019 10:57 AM   Wound Assessment Yellow;Red 4/17/2019 10:46 AM   Drainage Amount Copious 4/17/2019 10:46 AM   Drainage Description Purulent;Yellow 4/17/2019 10:46 AM   Odor None 4/17/2019 10:46 AM   Margins Attached edges 4/17/2019 10:46 AM   Louise-wound Assessment Clean;Dry;Pink 4/17/2019 10:57 AM   Red%Wound Bed 70 4/17/2019 10:57 AM   Yellow%Wound Bed 30 4/17/2019 10:57 AM   Number of days: 0       LABS      Lab Results   Component Value Date    BC No growth-preliminary 04/16/2019       Assessment:   ·  Infected right below-knee amputation stump. His draining jellylike material likely coming from the synovial fluid. We'll hold the wound VAC at this time. We will apply dressing with gauze and Kerlix. · Diabetes with neuropathy  · We will continue IV antibiotic. Patient will need more debridement of the wound or possible above-knee amputation. Patient will discuss with Dr. Audi Chung this afternoon. Patient Active Problem List   Diagnosis Code    Status post below knee amputation of right lower extremity ([facility] Z89.511    Gait disturbance R26.9    Sebaceous cyst L72.3    Uncontrolled type 2 diabetes mellitus with hyperglycemia, with long-term current use of insulin ([facility]) E11.65, Z79.4    Severe bipolar II disorder, recent episode major depressive, remission ([facility]) F31.81    Bipolar 1 disorder ([facility]) F31.9    Leukocytosis D72.829    Hyponatremia E87.1    Normocytic anemia D64.9    Obesity (BMI 30-39. 9) E66.9    Infection of below knee amputation stump ([facility]) T87.40    History of noncompliance with medical treatment Z91.19    Essential hypertension I10    Diabetic ulcer of right lower leg ([facility]) E11.622, L97.919    Hx of right BKA ([facility]) Z89.511    Tobacco dependence F17.200    History of osteomyelitis Z87.39    Gastroesophageal reflux disease without esophagitis K21.9    History of marijuana use Z87.898    Abscess of right leg L02.415    BKA stump complication ([facility] T87.9    Physical debility R53.81    Diabetic ulcer of left fifth toe (HCC) E11.621, L97.529    Cellulitis of fifth toe, left L03.032    Right BKA infection (HCC) T87.43    Cellulitis of right lower extremity L03.115    Anemia D64.9    Electrolyte imbalance E87.8    DM type 2, uncontrolled, with lower extremity ulcer (HCC) E11.622, E11.65, L97.909    Weakness R53.1    Deep postoperative wound infection T81.42XA    Infective myositis of right lower leg M60.061    Pyogenic inflammation of bone (HCC) M86.9    Cellulitis and abscess of leg L03.119, L02.419       Plan:     Patient examined and evaluated    Treatment: No orders of the defined types were placed in this encounter. Antibiotics: IV ceftriaxone     Please see attached Discharge Instructions    Written patient dismissal instructions given to patient and signed by patient or POA. Discharge Instructions       Visit Discharge/Physician Orders:    Home Care: [address] [place] [address] [address]     Wound Location: Right BKA SITE     Dressing orders:     1) Gather wound care supplies and arrange on clean table. 2) Wash your hands with soap and water or use alcohol based hand  for 20 seconds (sing \"Happy Birthday\" twice). 3) Cleanse wounds with normal saline or wound cleanser and gauze. Pat dry with clean gauze. 4)       Keep all dressings clean & dry. Do not shower, take baths or get wound wet, unless otherwise instructed by your Wound Care doctor. Follow up visit:   2 Weeks    Keep next scheduled appointment. Please give 24 hour notice if unable to keep appointment. [phone]    If you experience any of the following, please call the Wound Care Service during business hours: Monday through Friday 8:00 am - 4:30 pm  ([phone]).    *Increase in pain   *Temperature over 101   *Increase in drainage from your wound or a foul odor   *Uncontrolled swelling   *Need for compression bandage changes due to slippage, breakthrough drainage    If you need medical attention outside of business hours, please contact your Primary Care Doctor or go to the nearest emergency room.                    Electronically signed by Morgan Gibson MD on 4/17/2019 at 11:16 AM

## 2019-04-18 ENCOUNTER — ANESTHESIA EVENT (OUTPATIENT)
Dept: OPERATING ROOM | Age: 49
DRG: 475 | End: 2019-04-18
Payer: MEDICARE

## 2019-04-18 ENCOUNTER — ANESTHESIA (OUTPATIENT)
Dept: OPERATING ROOM | Age: 49
DRG: 475 | End: 2019-04-18
Payer: MEDICARE

## 2019-04-18 ENCOUNTER — HOSPITAL ENCOUNTER (INPATIENT)
Age: 49
LOS: 4 days | Discharge: NURSING, REHAB OR LONG-TERM CARE | DRG: 475 | End: 2019-04-22
Attending: ORTHOPAEDIC SURGERY | Admitting: ORTHOPAEDIC SURGERY
Payer: MEDICARE

## 2019-04-18 VITALS
SYSTOLIC BLOOD PRESSURE: 123 MMHG | RESPIRATION RATE: 1 BRPM | OXYGEN SATURATION: 100 % | DIASTOLIC BLOOD PRESSURE: 65 MMHG

## 2019-04-18 DIAGNOSIS — Z89.611 STATUS POST ABOVE KNEE AMPUTATION OF RIGHT LOWER EXTREMITY: Primary | ICD-10-CM

## 2019-04-18 PROBLEM — T87.43 INFECTION OF ABOVE KNEE AMPUTATION STUMP OF RIGHT LEG (HCC): Status: ACTIVE | Noted: 2019-04-18

## 2019-04-18 LAB
GLUCOSE BLD-MCNC: 306 MG/DL (ref 70–108)
GLUCOSE BLD-MCNC: 324 MG/DL (ref 70–108)
GLUCOSE BLD-MCNC: 362 MG/DL (ref 70–108)

## 2019-04-18 PROCEDURE — 2580000003 HC RX 258: Performed by: ORTHOPAEDIC SURGERY

## 2019-04-18 PROCEDURE — 7100000001 HC PACU RECOVERY - ADDTL 15 MIN: Performed by: ORTHOPAEDIC SURGERY

## 2019-04-18 PROCEDURE — 2709999900 HC NON-CHARGEABLE SUPPLY: Performed by: ORTHOPAEDIC SURGERY

## 2019-04-18 PROCEDURE — 7100000000 HC PACU RECOVERY - FIRST 15 MIN: Performed by: ORTHOPAEDIC SURGERY

## 2019-04-18 PROCEDURE — 6360000002 HC RX W HCPCS: Performed by: NURSE ANESTHETIST, CERTIFIED REGISTERED

## 2019-04-18 PROCEDURE — 3600000004 HC SURGERY LEVEL 4 BASE: Performed by: ORTHOPAEDIC SURGERY

## 2019-04-18 PROCEDURE — 6370000000 HC RX 637 (ALT 250 FOR IP): Performed by: PHYSICIAN ASSISTANT

## 2019-04-18 PROCEDURE — 3600000014 HC SURGERY LEVEL 4 ADDTL 15MIN: Performed by: ORTHOPAEDIC SURGERY

## 2019-04-18 PROCEDURE — 99231 SBSQ HOSP IP/OBS SF/LOW 25: CPT | Performed by: PHYSICIAN ASSISTANT

## 2019-04-18 PROCEDURE — 6370000000 HC RX 637 (ALT 250 FOR IP): Performed by: NURSE PRACTITIONER

## 2019-04-18 PROCEDURE — 82948 REAGENT STRIP/BLOOD GLUCOSE: CPT

## 2019-04-18 PROCEDURE — 2500000003 HC RX 250 WO HCPCS: Performed by: NURSE ANESTHETIST, CERTIFIED REGISTERED

## 2019-04-18 PROCEDURE — 6360000002 HC RX W HCPCS: Performed by: ANESTHESIOLOGY

## 2019-04-18 PROCEDURE — 3700000001 HC ADD 15 MINUTES (ANESTHESIA): Performed by: ORTHOPAEDIC SURGERY

## 2019-04-18 PROCEDURE — 0Y6C0Z3 DETACHMENT AT RIGHT UPPER LEG, LOW, OPEN APPROACH: ICD-10-PCS | Performed by: ORTHOPAEDIC SURGERY

## 2019-04-18 PROCEDURE — 88307 TISSUE EXAM BY PATHOLOGIST: CPT

## 2019-04-18 PROCEDURE — 1200000000 HC SEMI PRIVATE

## 2019-04-18 PROCEDURE — 6360000002 HC RX W HCPCS: Performed by: NURSE PRACTITIONER

## 2019-04-18 PROCEDURE — 2580000003 HC RX 258: Performed by: NURSE PRACTITIONER

## 2019-04-18 PROCEDURE — 3700000000 HC ANESTHESIA ATTENDED CARE: Performed by: ORTHOPAEDIC SURGERY

## 2019-04-18 RX ORDER — FENTANYL CITRATE 50 UG/ML
INJECTION, SOLUTION INTRAMUSCULAR; INTRAVENOUS PRN
Status: DISCONTINUED | OUTPATIENT
Start: 2019-04-18 | End: 2019-04-18 | Stop reason: SDUPTHER

## 2019-04-18 RX ORDER — HYDROCODONE BITARTRATE AND ACETAMINOPHEN 5; 325 MG/1; MG/1
2 TABLET ORAL EVERY 4 HOURS PRN
Status: DISCONTINUED | OUTPATIENT
Start: 2019-04-18 | End: 2019-04-20

## 2019-04-18 RX ORDER — INSULIN GLARGINE 100 [IU]/ML
55 INJECTION, SOLUTION SUBCUTANEOUS 2 TIMES DAILY
Status: DISCONTINUED | OUTPATIENT
Start: 2019-04-18 | End: 2019-04-22 | Stop reason: HOSPADM

## 2019-04-18 RX ORDER — PROPOFOL 10 MG/ML
INJECTION, EMULSION INTRAVENOUS PRN
Status: DISCONTINUED | OUTPATIENT
Start: 2019-04-18 | End: 2019-04-18 | Stop reason: SDUPTHER

## 2019-04-18 RX ORDER — FENTANYL CITRATE 50 UG/ML
100 INJECTION, SOLUTION INTRAMUSCULAR; INTRAVENOUS ONCE
Status: COMPLETED | OUTPATIENT
Start: 2019-04-18 | End: 2019-04-18

## 2019-04-18 RX ORDER — SODIUM CHLORIDE 9 MG/ML
INJECTION, SOLUTION INTRAVENOUS CONTINUOUS
Status: DISCONTINUED | OUTPATIENT
Start: 2019-04-18 | End: 2019-04-18

## 2019-04-18 RX ORDER — SODIUM CHLORIDE 0.9 % (FLUSH) 0.9 %
10 SYRINGE (ML) INJECTION EVERY 12 HOURS SCHEDULED
Status: DISCONTINUED | OUTPATIENT
Start: 2019-04-18 | End: 2019-04-18 | Stop reason: HOSPADM

## 2019-04-18 RX ORDER — LIDOCAINE HYDROCHLORIDE 20 MG/ML
INJECTION, SOLUTION INTRAVENOUS PRN
Status: DISCONTINUED | OUTPATIENT
Start: 2019-04-18 | End: 2019-04-18 | Stop reason: SDUPTHER

## 2019-04-18 RX ORDER — ACETAMINOPHEN 325 MG/1
650 TABLET ORAL EVERY 4 HOURS PRN
Status: DISCONTINUED | OUTPATIENT
Start: 2019-04-18 | End: 2019-04-22 | Stop reason: HOSPADM

## 2019-04-18 RX ORDER — ONDANSETRON 2 MG/ML
INJECTION INTRAMUSCULAR; INTRAVENOUS PRN
Status: DISCONTINUED | OUTPATIENT
Start: 2019-04-18 | End: 2019-04-18 | Stop reason: SDUPTHER

## 2019-04-18 RX ORDER — SUCCINYLCHOLINE/SOD CL,ISO/PF 200MG/10ML
SYRINGE (ML) INTRAVENOUS PRN
Status: DISCONTINUED | OUTPATIENT
Start: 2019-04-18 | End: 2019-04-18 | Stop reason: SDUPTHER

## 2019-04-18 RX ORDER — CYCLOBENZAPRINE HCL 10 MG
10 TABLET ORAL 3 TIMES DAILY PRN
Status: DISCONTINUED | OUTPATIENT
Start: 2019-04-18 | End: 2019-04-22 | Stop reason: HOSPADM

## 2019-04-18 RX ORDER — MIDAZOLAM HYDROCHLORIDE 1 MG/ML
INJECTION INTRAMUSCULAR; INTRAVENOUS PRN
Status: DISCONTINUED | OUTPATIENT
Start: 2019-04-18 | End: 2019-04-18 | Stop reason: SDUPTHER

## 2019-04-18 RX ORDER — SODIUM CHLORIDE 9 MG/ML
INJECTION, SOLUTION INTRAVENOUS CONTINUOUS
Status: DISCONTINUED | OUTPATIENT
Start: 2019-04-18 | End: 2019-04-22 | Stop reason: HOSPADM

## 2019-04-18 RX ORDER — LABETALOL HYDROCHLORIDE 5 MG/ML
5 INJECTION, SOLUTION INTRAVENOUS EVERY 10 MIN PRN
Status: DISCONTINUED | OUTPATIENT
Start: 2019-04-18 | End: 2019-04-18 | Stop reason: HOSPADM

## 2019-04-18 RX ORDER — MORPHINE SULFATE 4 MG/ML
4 INJECTION, SOLUTION INTRAMUSCULAR; INTRAVENOUS
Status: DISCONTINUED | OUTPATIENT
Start: 2019-04-18 | End: 2019-04-20

## 2019-04-18 RX ORDER — SODIUM CHLORIDE 0.9 % (FLUSH) 0.9 %
10 SYRINGE (ML) INJECTION PRN
Status: DISCONTINUED | OUTPATIENT
Start: 2019-04-18 | End: 2019-04-18 | Stop reason: HOSPADM

## 2019-04-18 RX ORDER — MEPERIDINE HYDROCHLORIDE 25 MG/ML
12.5 INJECTION INTRAMUSCULAR; INTRAVENOUS; SUBCUTANEOUS EVERY 5 MIN PRN
Status: DISCONTINUED | OUTPATIENT
Start: 2019-04-18 | End: 2019-04-18 | Stop reason: HOSPADM

## 2019-04-18 RX ORDER — DOCUSATE SODIUM 100 MG/1
100 CAPSULE, LIQUID FILLED ORAL DAILY
Status: DISCONTINUED | OUTPATIENT
Start: 2019-04-18 | End: 2019-04-22 | Stop reason: HOSPADM

## 2019-04-18 RX ORDER — CYCLOBENZAPRINE HCL 10 MG
10 TABLET ORAL 3 TIMES DAILY PRN
Qty: 40 TABLET | Refills: 0 | Status: ON HOLD | OUTPATIENT
Start: 2019-04-18 | End: 2019-04-30

## 2019-04-18 RX ORDER — PROMETHAZINE HYDROCHLORIDE 25 MG/ML
6.25 INJECTION, SOLUTION INTRAMUSCULAR; INTRAVENOUS
Status: DISCONTINUED | OUTPATIENT
Start: 2019-04-18 | End: 2019-04-18 | Stop reason: HOSPADM

## 2019-04-18 RX ORDER — FENTANYL CITRATE 50 UG/ML
50 INJECTION, SOLUTION INTRAMUSCULAR; INTRAVENOUS EVERY 5 MIN PRN
Status: DISCONTINUED | OUTPATIENT
Start: 2019-04-18 | End: 2019-04-18 | Stop reason: HOSPADM

## 2019-04-18 RX ORDER — MORPHINE SULFATE 2 MG/ML
2 INJECTION, SOLUTION INTRAMUSCULAR; INTRAVENOUS
Status: DISCONTINUED | OUTPATIENT
Start: 2019-04-18 | End: 2019-04-20

## 2019-04-18 RX ORDER — ONDANSETRON 2 MG/ML
4 INJECTION INTRAMUSCULAR; INTRAVENOUS EVERY 6 HOURS PRN
Status: DISCONTINUED | OUTPATIENT
Start: 2019-04-18 | End: 2019-04-22 | Stop reason: HOSPADM

## 2019-04-18 RX ORDER — HYDROCODONE BITARTRATE AND ACETAMINOPHEN 5; 325 MG/1; MG/1
1-2 TABLET ORAL
Qty: 40 TABLET | Refills: 0 | Status: ON HOLD | OUTPATIENT
Start: 2019-04-18 | End: 2019-04-30 | Stop reason: HOSPADM

## 2019-04-18 RX ORDER — ONDANSETRON 2 MG/ML
4 INJECTION INTRAMUSCULAR; INTRAVENOUS
Status: DISCONTINUED | OUTPATIENT
Start: 2019-04-18 | End: 2019-04-18 | Stop reason: HOSPADM

## 2019-04-18 RX ORDER — HYDROCODONE BITARTRATE AND ACETAMINOPHEN 5; 325 MG/1; MG/1
1 TABLET ORAL EVERY 4 HOURS PRN
Status: DISCONTINUED | OUTPATIENT
Start: 2019-04-18 | End: 2019-04-20

## 2019-04-18 RX ORDER — FENTANYL CITRATE 50 UG/ML
25 INJECTION, SOLUTION INTRAMUSCULAR; INTRAVENOUS EVERY 5 MIN PRN
Status: DISCONTINUED | OUTPATIENT
Start: 2019-04-18 | End: 2019-04-18 | Stop reason: HOSPADM

## 2019-04-18 RX ORDER — MIRTAZAPINE 15 MG/1
15 TABLET, FILM COATED ORAL NIGHTLY
Status: DISCONTINUED | OUTPATIENT
Start: 2019-04-18 | End: 2019-04-22 | Stop reason: HOSPADM

## 2019-04-18 RX ORDER — CEFTRIAXONE 1 G/1
2 INJECTION, POWDER, FOR SOLUTION INTRAMUSCULAR; INTRAVENOUS EVERY 24 HOURS
Status: DISCONTINUED | OUTPATIENT
Start: 2019-04-18 | End: 2019-04-18

## 2019-04-18 RX ADMIN — DOCUSATE SODIUM 100 MG: 100 CAPSULE, LIQUID FILLED ORAL at 13:30

## 2019-04-18 RX ADMIN — HYDROMORPHONE HYDROCHLORIDE 0.5 MG: 1 INJECTION, SOLUTION INTRAMUSCULAR; INTRAVENOUS; SUBCUTANEOUS at 11:50

## 2019-04-18 RX ADMIN — MIRTAZAPINE 15 MG: 15 TABLET, FILM COATED ORAL at 21:14

## 2019-04-18 RX ADMIN — CYCLOBENZAPRINE HYDROCHLORIDE 10 MG: 10 TABLET, FILM COATED ORAL at 13:30

## 2019-04-18 RX ADMIN — INSULIN GLARGINE 55 UNITS: 100 INJECTION, SOLUTION SUBCUTANEOUS at 21:15

## 2019-04-18 RX ADMIN — FENTANYL CITRATE 100 MCG: 50 INJECTION INTRAMUSCULAR; INTRAVENOUS at 10:44

## 2019-04-18 RX ADMIN — SODIUM CHLORIDE: 9 INJECTION, SOLUTION INTRAVENOUS at 09:45

## 2019-04-18 RX ADMIN — MORPHINE SULFATE 2 MG: 2 INJECTION, SOLUTION INTRAMUSCULAR; INTRAVENOUS at 16:20

## 2019-04-18 RX ADMIN — HYDROMORPHONE HYDROCHLORIDE 0.5 MG: 1 INJECTION, SOLUTION INTRAMUSCULAR; INTRAVENOUS; SUBCUTANEOUS at 12:05

## 2019-04-18 RX ADMIN — ONDANSETRON HYDROCHLORIDE 4 MG: 4 INJECTION, SOLUTION INTRAMUSCULAR; INTRAVENOUS at 11:12

## 2019-04-18 RX ADMIN — FENTANYL CITRATE 100 MCG: 50 INJECTION INTRAMUSCULAR; INTRAVENOUS at 10:39

## 2019-04-18 RX ADMIN — MIDAZOLAM HYDROCHLORIDE 2 MG: 1 INJECTION, SOLUTION INTRAMUSCULAR; INTRAVENOUS at 10:31

## 2019-04-18 RX ADMIN — SODIUM CHLORIDE: 9 INJECTION, SOLUTION INTRAVENOUS at 13:30

## 2019-04-18 RX ADMIN — HYDROMORPHONE HYDROCHLORIDE 0.5 MG: 1 INJECTION, SOLUTION INTRAMUSCULAR; INTRAVENOUS; SUBCUTANEOUS at 11:35

## 2019-04-18 RX ADMIN — SERTRALINE 150 MG: 100 TABLET, FILM COATED ORAL at 16:20

## 2019-04-18 RX ADMIN — HYDROCODONE BITARTRATE AND ACETAMINOPHEN 2 TABLET: 5; 325 TABLET ORAL at 13:30

## 2019-04-18 RX ADMIN — FENTANYL CITRATE 100 MCG: 50 INJECTION, SOLUTION INTRAMUSCULAR; INTRAVENOUS at 11:35

## 2019-04-18 RX ADMIN — SODIUM CHLORIDE: 9 INJECTION, SOLUTION INTRAVENOUS at 10:31

## 2019-04-18 RX ADMIN — PROPOFOL 200 MG: 10 INJECTION, EMULSION INTRAVENOUS at 10:39

## 2019-04-18 RX ADMIN — CEFTRIAXONE SODIUM 2 G: 2 INJECTION, POWDER, FOR SOLUTION INTRAMUSCULAR; INTRAVENOUS at 16:20

## 2019-04-18 RX ADMIN — FENTANYL CITRATE 100 MCG: 50 INJECTION INTRAMUSCULAR; INTRAVENOUS at 10:55

## 2019-04-18 RX ADMIN — HYDROCODONE BITARTRATE AND ACETAMINOPHEN 2 TABLET: 5; 325 TABLET ORAL at 21:14

## 2019-04-18 RX ADMIN — Medication 2 G: at 10:42

## 2019-04-18 RX ADMIN — INSULIN LISPRO 18 UNITS: 100 INJECTION, SOLUTION INTRAVENOUS; SUBCUTANEOUS at 21:14

## 2019-04-18 RX ADMIN — Medication 140 MG: at 10:39

## 2019-04-18 RX ADMIN — HYDROMORPHONE HYDROCHLORIDE 0.5 MG: 1 INJECTION, SOLUTION INTRAMUSCULAR; INTRAVENOUS; SUBCUTANEOUS at 11:30

## 2019-04-18 RX ADMIN — LIDOCAINE HYDROCHLORIDE 100 MG: 20 INJECTION, SOLUTION INTRAVENOUS at 10:39

## 2019-04-18 ASSESSMENT — PULMONARY FUNCTION TESTS
PIF_VALUE: 5
PIF_VALUE: 17
PIF_VALUE: 19
PIF_VALUE: 17
PIF_VALUE: 15
PIF_VALUE: 22
PIF_VALUE: 6
PIF_VALUE: 1
PIF_VALUE: 20
PIF_VALUE: 20
PIF_VALUE: 17
PIF_VALUE: 5
PIF_VALUE: 2
PIF_VALUE: 1
PIF_VALUE: 3
PIF_VALUE: 3
PIF_VALUE: 4
PIF_VALUE: 21
PIF_VALUE: 21
PIF_VALUE: 18
PIF_VALUE: 1
PIF_VALUE: 17
PIF_VALUE: 1
PIF_VALUE: 16
PIF_VALUE: 18
PIF_VALUE: 1
PIF_VALUE: 21
PIF_VALUE: 16
PIF_VALUE: 15
PIF_VALUE: 17
PIF_VALUE: 4
PIF_VALUE: 19
PIF_VALUE: 4
PIF_VALUE: 15
PIF_VALUE: 21
PIF_VALUE: 23
PIF_VALUE: 8
PIF_VALUE: 1
PIF_VALUE: 22
PIF_VALUE: 21
PIF_VALUE: 5
PIF_VALUE: 4
PIF_VALUE: 3
PIF_VALUE: 30
PIF_VALUE: 19

## 2019-04-18 ASSESSMENT — PAIN DESCRIPTION - LOCATION
LOCATION: OTHER (COMMENT)
LOCATION: LEG
LOCATION: LEG

## 2019-04-18 ASSESSMENT — PAIN DESCRIPTION - PROGRESSION
CLINICAL_PROGRESSION: NOT CHANGED
CLINICAL_PROGRESSION: RAPIDLY WORSENING
CLINICAL_PROGRESSION: RAPIDLY WORSENING
CLINICAL_PROGRESSION: NOT CHANGED

## 2019-04-18 ASSESSMENT — PAIN SCALES - GENERAL
PAINLEVEL_OUTOF10: 5
PAINLEVEL_OUTOF10: 10
PAINLEVEL_OUTOF10: 4
PAINLEVEL_OUTOF10: 4
PAINLEVEL_OUTOF10: 10
PAINLEVEL_OUTOF10: 6
PAINLEVEL_OUTOF10: 8
PAINLEVEL_OUTOF10: 10
PAINLEVEL_OUTOF10: 9

## 2019-04-18 ASSESSMENT — PAIN DESCRIPTION - DESCRIPTORS
DESCRIPTORS: ACHING
DESCRIPTORS: ACHING;DISCOMFORT
DESCRIPTORS: SORE;ACHING
DESCRIPTORS: ACHING

## 2019-04-18 ASSESSMENT — PAIN DESCRIPTION - PAIN TYPE
TYPE: SURGICAL PAIN
TYPE: ACUTE PAIN;SURGICAL PAIN
TYPE: ACUTE PAIN;SURGICAL PAIN

## 2019-04-18 ASSESSMENT — PAIN DESCRIPTION - ONSET
ONSET: ON-GOING

## 2019-04-18 ASSESSMENT — PAIN DESCRIPTION - ORIENTATION
ORIENTATION: RIGHT

## 2019-04-18 ASSESSMENT — PAIN DESCRIPTION - FREQUENCY
FREQUENCY: CONTINUOUS

## 2019-04-18 ASSESSMENT — PAIN DESCRIPTION - DIRECTION
RADIATING_TOWARDS: POSTERIOR
RADIATING_TOWARDS: POSTERIOR

## 2019-04-18 ASSESSMENT — PAIN - FUNCTIONAL ASSESSMENT
PAIN_FUNCTIONAL_ASSESSMENT: 0-10
PAIN_FUNCTIONAL_ASSESSMENT: PREVENTS OR INTERFERES SOME ACTIVE ACTIVITIES AND ADLS

## 2019-04-18 NOTE — ANESTHESIA PRE PROCEDURE
foot    FOOT DEBRIDEMENT Right 2016    I & D    INCISION AND DRAINAGE Right 2019    I&D RIGHT STUMP performed by Lizzette Arriaga MD at [address] [facility] Right 2016    LEG AMPUTATION BELOW KNEE Right 2019    I&D AND REVISION OF AMPUTATION RIGHT LEG performed by Lizzette Arriaga MD at [address] [facility] Right 1/14/15    sole of foot I&D    [place] DRAIN INFECT SHOULDER BURSA Left 2017    LEFT SHOULDER INCISION AND DRAINAGE performed by Lizzette Arriaga MD at [address] OFFICE/OUTPT [address] [facility] Right 2018    EXCISIONAL DEBRIDEMENT RIGHT BKA STUMP performed by Bailee So MD at [address] Right 1/16/15    2nd toe with wound vac applied    WISDOM TOOTH EXTRACTION  ? when       Social History:    Social History     Tobacco Use    Smoking status: Former Smoker     Packs/day: 5.00     Years: 13.00     Pack years: 65.00     Types: Cigars     Last attempt to quit: 1985     Years since quittin.3    Smokeless tobacco: Never Used   Substance Use Topics    Alcohol use: Not Currently     Alcohol/week: 0.0 oz                                Counseling given: Not Answered      Vital Signs (Current): There were no vitals filed for this visit.                                            BP Readings from Last 3 Encounters:   19 136/65   19 (!) 143/79   19 126/63       NPO Status:                                                                                 BMI:   Wt Readings from Last 3 Encounters:   19 222 lb (100.7 kg)   19 222 lb (100.7 kg)   19 222 lb (100.7 kg)     There is no height or weight on file to calculate BMI.    CBC:   Lab Results   Component Value Date    WBC 13.1 2019    RBC 4.06 2019    HGB 11.7 2019    HCT 33.9 2019    MCV 83.5 2019    RDW 14.4 2018     2019       CMP:   Lab Results   Component Value Date     04/16/2019    K 4.3 04/16/2019    K 4.2 04/03/2019    CL 95 04/16/2019    CO2 24 04/16/2019    BUN 12 04/16/2019    CREATININE 0.8 04/16/2019    GFRAA >60 08/24/2016    LABGLOM >90 04/16/2019    GLUCOSE 353 04/16/2019    PROT 7.7 04/16/2019    CALCIUM 9.1 04/16/2019    BILITOT 0.3 04/16/2019    ALKPHOS 88 04/16/2019    AST 10 04/16/2019    ALT 6 04/16/2019       POC Tests:   No results for input(s): POCGLU, POCNA, POCK, POCCL, POCBUN, POCHEMO, POCHCT in the last 72 hours. Coags:   Lab Results   Component Value Date    PROTIME 12.2 01/03/2019    INR 1.06 01/03/2019    APTT 28.6 07/19/2016       HCG (If Applicable): No results found for: PREGTESTUR, PREGSERUM, HCG, HCGQUANT     ABGs: No results found for: PHART, PO2ART, HIC9QBS, OPX1OKK, BEART, G7UVEAQA     Type & Screen (If Applicable):  No results found for: LABABO, [address]    Anesthesia Evaluation  Patient summary reviewed and Nursing notes reviewed no history of anesthetic complications:   Airway: Mallampati: III  TM distance: >3 FB   Neck ROM: full  Mouth opening: > = 3 FB Dental:    (+) upper dentures and lower dentures      Pulmonary:Negative Pulmonary ROS and normal exam  breath sounds clear to auscultation            Patient did not smoke on day of surgery. Cardiovascular:  Exercise tolerance: good (>4 METS),   (+) hypertension:,       ECG reviewed                        Neuro/Psych:   (+) neuromuscular disease:, psychiatric history:            GI/Hepatic/Renal:   (+) GERD: well controlled,          ROS comment: obesity. Endo/Other:    (+) DiabetesType II DM, poorly controlled, using insulin, . Pt had no PAT visit       Abdominal:   (+) obese,     Abdomen: soft. Vascular: negative vascular ROS. Anesthesia Plan      general     ASA 3       Induction: intravenous. MIPS: Postoperative opioids intended and Prophylactic antiemetics administered.   Anesthetic plan and risks discussed with patient. Plan discussed with CRNA.                   Griselda Eason MD   4/18/2019

## 2019-04-18 NOTE — H&P
History and Physical Update    Pt Name: Enrique Herrera  MRN: [id number]  YOB: 1970  Date of evaluation: 4/18/2019    [x] I have examined the patient and reviewed the H&P/Consult and there are no changes to the patient or plans.     [] I have examined the patient and reviewed the H&P/Consult and have noted the following changes:        MARILYN Friedman CNP   Electronically signed 4/18/2019 at 10:31 AM

## 2019-04-18 NOTE — CONSULTS
Consult History & Physical      Patient:  Kaye Ayala  YOB: 1970    MRN: [id number]     Acct: [de-identified]      Chief Complaint:  Uncontrolled Type 2 DM     Date of Service: Pt seen/examined in consultation on 4/18/2019    History Of Present Illness:      44 y.o. male who we are asked to see/evaluate by Tessy Tate MD for medical management of uncontrolled type 2. The patient states that he is compliant with his medications. He says that he checks his blood sugar everyday. However, his HgbA1c 9.1. The patient states that he cannot figure out how to get his sugar in a controlled range. The patient was also eating a cheeseburger with a white bread bun.      Past Medical History:        Diagnosis Date    Bipolar 2 disorder ([address]     previously followed with Dr. Quita Mejía and Magdiel Hamm [facility] [place] Diabetes mellitus type 2, uncontrolled ([address]     Diabetic foot ulcer ([address]     Diabetic polyneuropathy ([address]     Diabetic ulcer of right foot associated with type 2 diabetes mellitus ([address] 12/10/2015    Essential hypertension     \"never been on b/p medication that I know of\"    GERD (gastroesophageal reflux disease)     Hammer toe of left foot     Heart murmur     denies any chest pain or palpitations    History of tobacco abuse     Hx of BKA, right (HCC)     Macular edema, diabetic, bilateral ([address] 05/04/2018    Dr. 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 ([address] 6/29/2016       Past Surgical History:        Procedure Laterality Date    ABSCESS DRAINAGE Right     foot    AMPUTATION ABOVE KNEE Right 4/18/2019    RIGHT ABOVE KNEE AMPUTATION performed by Tessy Tate MD at [facility] [address] Right 07/01/2016    I & D    INCISION AND DRAINAGE Right 2/18/2019    I&D RIGHT STUMP performed by Tessy Tate MD at [place] [address] Test blood sugars 4 times daily DX:E11.65 10/23/18  Yes MARILYN Pruitt CNP   Lancets MISC Use to test blood sugars 4 times daily DX:E11.65 8/23/18  Yes Goldie Alvarado PA-C   Insulin Syringe-Needle U-100 29G X 1/2\" 0.3 ML MISC 1 each by Does not apply route 4 times daily (after meals and at bedtime) 8/23/18  Yes Elisha Palacios PA-C   Insulin Pen Needle 32G X 4 MM MISC 1 each by Does not apply route daily 8/23/18  Yes Goldie Alvarado PA-C   sertraline (ZOLOFT) 100 MG tablet Take 1.5 tablets by mouth daily 4/23/18  Yes MARILYN Rodas CNP   Blood Glucose Monitoring Suppl LIANNE Use to test blood sugars DX:E11.65 4/17/18  Yes MARILYN Rodas CNP   acetaminophen (TYLENOL) 500 MG tablet Take 1,000 mg by mouth every 6 hours as needed for Pain    Historical Provider, MD       Allergies:  Pcn [penicillins] and Clindamycin/lincomycin    Social History:      The patient currently lives at home    TOBACCO:   reports that he quit smoking about 34 years ago. His smoking use included cigars. He has a 65.00 pack-year smoking history. He has never used smokeless tobacco.  ETOH:   reports that he drank alcohol. Family History:      Reviewed in detail and negative for DM, CAD, Cancer, CVA. Positive as follows:        Problem Relation Age of Onset    Diabetes Mother     Other Mother         pneumonia, H1N1    Depression Mother     Early Death Mother     High Blood Pressure Mother     High Cholesterol Mother     Vision Loss Maternal Grandmother     Arthritis Maternal Grandfather     Heart Disease Maternal Grandfather        Diet:  DIET CARB CONTROL; Carb Control: 5 carbs/meal (approximate 2000 kcals/day)    REVIEW OF SYSTEMS:   Pertinent positives as noted in the HPI. All other systems reviewed and negative.     PHYSICAL EXAM:  BP (!) 167/81   Pulse 98   Temp 98.4 °F (36.9 °C) (Oral)   Resp 12   Ht 5' 6\" (1.676 m)   Wt 222 lb (100.7 kg) Comment: taken 2/2019  SpO2 94%   BMI 35.83 kg/m²   General appearance: No apparent distress, appears stated age and cooperative. HEENT: Normal cephalic, atraumatic without obvious deformity. Pupils equal, round, and reactive to light. Extra ocular muscles intact. Conjunctivae/corneas clear. Neck: Supple, with full range of motion. No jugular venous distention. Trachea midline. Respiratory:  Normal respiratory effort on RA. Clear to auscultation, bilaterally without Rales/Wheezes/Rhonchi. Cardiovascular: Regular rate and rhythm with normal S1/S2 without murmurs, rubs or gallops. Abdomen: Soft, non-tender, non-distended with normal bowel sounds. Musculoskeletal: BKA, right. Dressing c/d/i. No clubbing, cyanosis or edema bilaterally. Skin: Skin color, texture, turgor normal.  No rashes or lesions. Neurologic:  Neurovascularly intact without any focal sensory/motor deficits. Cranial nerves: II-XII intact, grossly non-focal.  Psychiatric: Alert and oriented, thought content appropriate, normal insight  Capillary Refill: Brisk,< 3 seconds   Peripheral Pulses: +2 palpable, equal bilaterally     Labs:     Recent Labs     04/16/19 0314   WBC 13.1*   HGB 11.7*   HCT 33.9*   *     Recent Labs     04/16/19 0314   *   K 4.3   CL 95*   CO2 24   BUN 12   CREATININE 0.8   CALCIUM 9.1     Recent Labs     04/16/19 0314   AST 10   ALT 6*   BILITOT 0.3   ALKPHOS 88     No results for input(s): INR in the last 72 hours. No results for input(s): [place] in the last 72 hours.     Urinalysis:    Lab Results   Component Value Date    NITRU NEGATIVE 04/16/2019    WBCUA 2-4 04/16/2019    BACTERIA NONE 04/16/2019    RBCUA 0-2 04/16/2019    BLOODU NEGATIVE 04/16/2019    SPECGRAV 1.027 04/16/2019    GLUCOSEU >= 1000 02/15/2019         DVT prophylaxis: [] Lovenox                                 [] SCDs                                 [] SQ Heparin                                 [] Encourage ambulation           [x] Already on Anticoagulation      Code Status: Full

## 2019-04-18 NOTE — BRIEF OP NOTE
Brief Postoperative Note  ______________________________________________________________    Patient: Fawad Morgan  YOB: 1970  MRN: [id number]  Date of Procedure: 4/18/2019    Pre-Op Diagnosis: INFECTION RIGHT STUMP    Post-Op Diagnosis: Same       Procedure(s):  RIGHT ABOVE KNEE AMPUTATION    Anesthesia: General    Surgeon(s):  Irina Powell MD    Staff:  Scrub Person First: Felisa Beck  Scrub Person Second: Kennedy Jara  Physician Assistant: Dani Hodgson PA-C     Estimated Blood Loss: 50 (units unknown)    Complications: None    Specimens:   ID Type Source Tests Collected by Time Destination   A : Right Above Knee Amputation Tissue Leg 2495 Mervin Bean MD 4/18/2019 1058        Implants:  * No implants in log *      Drains:   [REMOVED] Negative Pressure Wound Therapy Leg Right;Lateral (Removed)   Wound Type Surgical 4/10/2019  2:00 PM   Dressing Type Black foam 4/10/2019  2:00 PM   Cycle Continuous 4/10/2019  2:00 PM   Target Pressure (mmHg) 125 4/10/2019  2:00 PM   Canister changed? No 4/8/2019 11:10 PM   Dressing Status Clean;Dry; Intact 4/10/2019  2:00 PM   Drainage Amount Scant 4/6/2019  4:30 PM   Output (ml) 0 ml 4/10/2019 12:58 PM   Wound Assessment CODY 4/9/2019  4:45 AM   Louise-wound Assessment CODY 4/9/2019  4:45 AM       Findings: confirmed     MARILYN Goldman - CNP  Date: 4/18/2019  Time: 11:27 AM

## 2019-04-18 NOTE — FLOWSHEET NOTE
04/18/19 1259   Patient Goal of the Day(Whiteboard)   Patient Daily Goal reviewed with Patient   Patient's reason for admission R. AKA   Precautions   Precautions Fall risk   Negative Pressure Room No   Positive Pressure Room No   Safe Environment   Arm Bands On ID; Allergies; Fall;Limb Alert   Patient has limb restriction? Yes   Affected limb RUE   What restriction? BP cuff;IV/Venipuncture   Reason for restriction? Other (comment)  (Right PICC)   Call Light Within Reach Yes   Overbed Table Within Reach Yes   Bed In Lowest Position Yes   Bed Wheels Locked Yes   Siderails Up 2/4   Fall Risk Interventions   Hourly Visual Checks Awake; In bed   Fall Visual Posted Armband; Fall sign posted   Room Door Open Yes   Alarm On Bed   Mobility   Activity In bed   Repositioned Turns self;Semi fowlers;Pillow support   Head of Bed Elevated  Self regulated   Heels/Feet Foot of bed elevated; Heel(s) elevated off bed   Range of Motion Active; All extremities   Patient Arrival To Unit 1250   Transport Method Stretcher   Pain Assessment   Pain Assessment 0-10   Pain Level 10   Patient's Stated Pain Goal 4   Pain Type Acute pain;Surgical pain   Pain Location Leg   Pain Orientation Right   Pain Radiating Towards posterior   Pain Descriptors Aching   Pain Frequency Continuous   Pain Onset On-going   Clinical Progression Rapidly worsening   Non-Pharmaceutical Pain Intervention(s) Repositioned;Rest;Elevation       Pt arrived in 7K 9 From PACU via stretcher. Complaints: Pain 10+/10 in right stump. IV normal saline infusing into the Right PICC at a rate of 100 mls/ hour with about 200 mls remaining in the bag. The best day to schedule a follow up Dr appointment is:  Monday a.m. The patient is interested in [facility] to beds program?:  Yes    Patient is breathing regular and unlabored, lung sounds are clear and diminished throughout, denies SOB, SpO2 remains above 93% on room air.   Patient denies pain to left calf,

## 2019-04-18 NOTE — ANESTHESIA POSTPROCEDURE EVALUATION
Department of Anesthesiology  Postprocedure Note    Patient: Victoriano Woodruff  MRN: [id number]  YOB: 1970  Date of evaluation: 4/18/2019  Time:  12:53 PM     Procedure Summary     Date:  04/18/19 Room / Location:  [address] [facility] [place]    Anesthesia Start:  1031 Anesthesia Stop:  1532    Procedure:  RIGHT ABOVE KNEE AMPUTATION (Right ) Diagnosis:  (INFECTION RIGHT STUMP)    Surgeon:  Franco Pike MD Responsible Provider:  Tana Quan MD    Anesthesia Type:  general ASA Status:  3          Anesthesia Type: general    Everette Phase I: Everette Score: 10    Everette Phase II:      Last vitals: Reviewed and per EMR flowsheets.        Anesthesia Post Evaluation    Patient location during evaluation: PACU  Patient participation: complete - patient participated  Level of consciousness: awake and alert  Airway patency: patent  Nausea & Vomiting: no nausea  Complications: no  Cardiovascular status: blood pressure returned to baseline and hemodynamically stable  Respiratory status: acceptable and spontaneous ventilation  Hydration status: euvolemic

## 2019-04-18 NOTE — CARE COORDINATION
4/18/19, 2:29 PM      Júnior Mueller       Admitted from: OR     4/18/2019/ [address] [facility] day: 0   Location: 7K-09/009-A Reason for admit: Infection of above knee amputation stump of right leg ([address] [T87.43] Status: Inpatient  Admit order signed?: yes  PMH:  has a past medical history of Bipolar 2 disorder ([address] Diabetes mellitus type 2, uncontrolled ([address] Diabetic foot ulcer ([address] Diabetic polyneuropathy ([address] Diabetic ulcer of right foot associated with type 2 diabetes mellitus ([address] Essential hypertension, GERD (gastroesophageal reflux disease), Hammer toe of left foot, Heart murmur, History of tobacco abuse, Hx of BKA, right ([address] Macular edema, diabetic, bilateral ([address] Marijuana abuse in remission, Onychomycosis, and 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 Procedure: 04/18/19 by Dr. Blas Morning        Pertinent abnormal Imaging:na  Medications:  Scheduled Meds:   docusate sodium  100 mg Oral Daily    sertraline  150 mg Oral Daily    cefTRIAXone (ROCEPHIN) IV  2 g Intravenous Q24H     Continuous Infusions:   sodium chloride 100 mL/hr at 04/18/19 1330     Pertinent Info/Orders/Treatment Plan:  IVF, N/V checks, dressing care, ice to right stump, pain management, PT and OT evals,  IV Rocephin, has PICC line, accu checks as ordered with insulin as ordered,hospitalist consulted to follow, ID consulted, Flexeril, ice as ordered. Diet: DIET CARB CONTROL; Carb Control: 5 carbs/meal (approximate 2000 kcals/day)   Smoking status:  reports that he quit smoking about 34 years ago. His smoking use included cigars. He has a 65.00 pack-year smoking history. He has never used smokeless tobacco.   PCP: Alice Walker MD  Readmission: yes    Patient has been readmitted within 8 days. Patient went to f/u appointment? No   If yes, was it within 7 days? no  Patient was able to fill prescriptions? yes  Patient is taking medications as prescribed?  Yes  Home IV antbs  Cause for readmission? Surgery today    Readmission Risk Score: 30%    Discharge Planning  Current Residence:  Other (Comment)  Living Arrangements:  Other (Comment)   Support Systems:  Friends/Neighbors  Current Services PTA:   yes [facility] for IV antbx  Potential Assistance Needed:  Home Care  Potential Assistance Purchasing Medications:  Yes  Does patient want to participate in local refill/ meds to beds program?  Yes  Type of Home Care Services:  Nursing Services  Patient expects to be discharged to:  Home  Expected Discharge date:  04/20/19  Follow Up Appointment: Best Day/ Time: Monday AM    Discharge Plan: Met with Madhu Canales; he lives home with a room mate, has wheel chair that needs some repair/replaced, has crutches, currently has [facility] for home IV antibiotics thru [facility],  has glucometer, set up with PCP today first choice was Nusrat Kingston MD. Patient requesting to go to IP rehab post OR and will need transportation at discharge.      Evaluation: no

## 2019-04-19 LAB
ANION GAP SERPL CALCULATED.3IONS-SCNC: 12 MEQ/L (ref 8–16)
BASOPHILS # BLD: 0.4 %
BASOPHILS ABSOLUTE: 0.1 THOU/MM3 (ref 0–0.1)
BUN BLDV-MCNC: 10 MG/DL (ref 7–22)
CALCIUM SERPL-MCNC: 8.6 MG/DL (ref 8.5–10.5)
CHLORIDE BLD-SCNC: 97 MEQ/L (ref 98–111)
CO2: 24 MEQ/L (ref 23–33)
CREAT SERPL-MCNC: 0.6 MG/DL (ref 0.4–1.2)
EOSINOPHIL # BLD: 0.6 %
EOSINOPHILS ABSOLUTE: 0.1 THOU/MM3 (ref 0–0.4)
ERYTHROCYTE [DISTWIDTH] IN BLOOD BY AUTOMATED COUNT: 14.9 % (ref 11.5–14.5)
ERYTHROCYTE [DISTWIDTH] IN BLOOD BY AUTOMATED COUNT: 44.9 FL (ref 35–45)
GFR SERPL CREATININE-BSD FRML MDRD: > 90 ML/MIN/1.73M2
GLUCOSE BLD-MCNC: 204 MG/DL (ref 70–108)
GLUCOSE BLD-MCNC: 204 MG/DL (ref 70–108)
GLUCOSE BLD-MCNC: 229 MG/DL (ref 70–108)
GLUCOSE BLD-MCNC: 241 MG/DL (ref 70–108)
GLUCOSE BLD-MCNC: 86 MG/DL (ref 70–108)
HCT VFR BLD CALC: 31.9 % (ref 42–52)
HEMOGLOBIN: 11 GM/DL (ref 14–18)
IMMATURE GRANS (ABS): 0.06 THOU/MM3 (ref 0–0.07)
IMMATURE GRANULOCYTES: 0.5 %
LYMPHOCYTES # BLD: 11.9 %
LYMPHOCYTES ABSOLUTE: 1.6 THOU/MM3 (ref 1–4.8)
MCH RBC QN AUTO: 28.6 PG (ref 26–33)
MCHC RBC AUTO-ENTMCNC: 34.5 GM/DL (ref 32.2–35.5)
MCV RBC AUTO: 82.9 FL (ref 80–94)
MONOCYTES # BLD: 9.6 %
MONOCYTES ABSOLUTE: 1.3 THOU/MM3 (ref 0.4–1.3)
NUCLEATED RED BLOOD CELLS: 0 /100 WBC
PLATELET # BLD: 370 THOU/MM3 (ref 130–400)
PMV BLD AUTO: 8.6 FL (ref 9.4–12.4)
POTASSIUM SERPL-SCNC: 4 MEQ/L (ref 3.5–5.2)
RBC # BLD: 3.85 MILL/MM3 (ref 4.7–6.1)
SEG NEUTROPHILS: 77 %
SEGMENTED NEUTROPHILS ABSOLUTE COUNT: 10.2 THOU/MM3 (ref 1.8–7.7)
SODIUM BLD-SCNC: 133 MEQ/L (ref 135–145)
WBC # BLD: 13.2 THOU/MM3 (ref 4.8–10.8)

## 2019-04-19 PROCEDURE — 80048 BASIC METABOLIC PNL TOTAL CA: CPT

## 2019-04-19 PROCEDURE — 36415 COLL VENOUS BLD VENIPUNCTURE: CPT

## 2019-04-19 PROCEDURE — 1200000000 HC SEMI PRIVATE

## 2019-04-19 PROCEDURE — 97110 THERAPEUTIC EXERCISES: CPT

## 2019-04-19 PROCEDURE — 85025 COMPLETE CBC W/AUTO DIFF WBC: CPT

## 2019-04-19 PROCEDURE — 2580000003 HC RX 258: Performed by: NURSE PRACTITIONER

## 2019-04-19 PROCEDURE — 97166 OT EVAL MOD COMPLEX 45 MIN: CPT

## 2019-04-19 PROCEDURE — 6370000000 HC RX 637 (ALT 250 FOR IP): Performed by: PHYSICIAN ASSISTANT

## 2019-04-19 PROCEDURE — 97530 THERAPEUTIC ACTIVITIES: CPT

## 2019-04-19 PROCEDURE — 6360000002 HC RX W HCPCS: Performed by: NURSE PRACTITIONER

## 2019-04-19 PROCEDURE — 82948 REAGENT STRIP/BLOOD GLUCOSE: CPT

## 2019-04-19 PROCEDURE — 97535 SELF CARE MNGMENT TRAINING: CPT

## 2019-04-19 PROCEDURE — 97161 PT EVAL LOW COMPLEX 20 MIN: CPT

## 2019-04-19 PROCEDURE — 6370000000 HC RX 637 (ALT 250 FOR IP): Performed by: NURSE PRACTITIONER

## 2019-04-19 RX ORDER — DEXTROSE MONOHYDRATE 50 MG/ML
100 INJECTION, SOLUTION INTRAVENOUS PRN
Status: DISCONTINUED | OUTPATIENT
Start: 2019-04-19 | End: 2019-04-22 | Stop reason: HOSPADM

## 2019-04-19 RX ORDER — NICOTINE POLACRILEX 4 MG
15 LOZENGE BUCCAL PRN
Status: DISCONTINUED | OUTPATIENT
Start: 2019-04-19 | End: 2019-04-22 | Stop reason: HOSPADM

## 2019-04-19 RX ORDER — DEXTROSE MONOHYDRATE 25 G/50ML
12.5 INJECTION, SOLUTION INTRAVENOUS PRN
Status: DISCONTINUED | OUTPATIENT
Start: 2019-04-19 | End: 2019-04-22 | Stop reason: HOSPADM

## 2019-04-19 RX ADMIN — SERTRALINE 150 MG: 100 TABLET, FILM COATED ORAL at 07:51

## 2019-04-19 RX ADMIN — HYDROCODONE BITARTRATE AND ACETAMINOPHEN 2 TABLET: 5; 325 TABLET ORAL at 18:40

## 2019-04-19 RX ADMIN — CEFTRIAXONE SODIUM 2 G: 2 INJECTION, POWDER, FOR SOLUTION INTRAMUSCULAR; INTRAVENOUS at 15:27

## 2019-04-19 RX ADMIN — HYDROCODONE BITARTRATE AND ACETAMINOPHEN 2 TABLET: 5; 325 TABLET ORAL at 05:27

## 2019-04-19 RX ADMIN — CYCLOBENZAPRINE HYDROCHLORIDE 10 MG: 10 TABLET, FILM COATED ORAL at 09:37

## 2019-04-19 RX ADMIN — CYCLOBENZAPRINE HYDROCHLORIDE 10 MG: 10 TABLET, FILM COATED ORAL at 18:40

## 2019-04-19 RX ADMIN — HYDROCODONE BITARTRATE AND ACETAMINOPHEN 2 TABLET: 5; 325 TABLET ORAL at 22:51

## 2019-04-19 RX ADMIN — INSULIN LISPRO 18 UNITS: 100 INJECTION, SOLUTION INTRAVENOUS; SUBCUTANEOUS at 08:03

## 2019-04-19 RX ADMIN — INSULIN LISPRO 18 UNITS: 100 INJECTION, SOLUTION INTRAVENOUS; SUBCUTANEOUS at 12:00

## 2019-04-19 RX ADMIN — MIRTAZAPINE 15 MG: 15 TABLET, FILM COATED ORAL at 20:28

## 2019-04-19 RX ADMIN — MORPHINE SULFATE 4 MG: 4 INJECTION INTRAVENOUS at 10:26

## 2019-04-19 RX ADMIN — HYDROCODONE BITARTRATE AND ACETAMINOPHEN 2 TABLET: 5; 325 TABLET ORAL at 13:56

## 2019-04-19 RX ADMIN — MORPHINE SULFATE 2 MG: 2 INJECTION, SOLUTION INTRAMUSCULAR; INTRAVENOUS at 17:03

## 2019-04-19 RX ADMIN — MORPHINE SULFATE 2 MG: 2 INJECTION, SOLUTION INTRAMUSCULAR; INTRAVENOUS at 07:51

## 2019-04-19 RX ADMIN — INSULIN GLARGINE 55 UNITS: 100 INJECTION, SOLUTION SUBCUTANEOUS at 08:02

## 2019-04-19 RX ADMIN — HYDROCODONE BITARTRATE AND ACETAMINOPHEN 2 TABLET: 5; 325 TABLET ORAL at 09:37

## 2019-04-19 RX ADMIN — INSULIN GLARGINE 55 UNITS: 100 INJECTION, SOLUTION SUBCUTANEOUS at 22:00

## 2019-04-19 RX ADMIN — MORPHINE SULFATE 2 MG: 2 INJECTION, SOLUTION INTRAMUSCULAR; INTRAVENOUS at 20:28

## 2019-04-19 RX ADMIN — HYDROCODONE BITARTRATE AND ACETAMINOPHEN 2 TABLET: 5; 325 TABLET ORAL at 01:23

## 2019-04-19 ASSESSMENT — PAIN DESCRIPTION - LOCATION
LOCATION: OTHER (COMMENT)

## 2019-04-19 ASSESSMENT — PAIN DESCRIPTION - ONSET
ONSET: ON-GOING

## 2019-04-19 ASSESSMENT — PAIN SCALES - GENERAL
PAINLEVEL_OUTOF10: 4
PAINLEVEL_OUTOF10: 8
PAINLEVEL_OUTOF10: 8
PAINLEVEL_OUTOF10: 10
PAINLEVEL_OUTOF10: 5
PAINLEVEL_OUTOF10: 1
PAINLEVEL_OUTOF10: 9
PAINLEVEL_OUTOF10: 5
PAINLEVEL_OUTOF10: 5
PAINLEVEL_OUTOF10: 3
PAINLEVEL_OUTOF10: 5
PAINLEVEL_OUTOF10: 9
PAINLEVEL_OUTOF10: 0
PAINLEVEL_OUTOF10: 8
PAINLEVEL_OUTOF10: 9
PAINLEVEL_OUTOF10: 8
PAINLEVEL_OUTOF10: 6
PAINLEVEL_OUTOF10: 7
PAINLEVEL_OUTOF10: 10
PAINLEVEL_OUTOF10: 8
PAINLEVEL_OUTOF10: 8

## 2019-04-19 ASSESSMENT — PAIN DESCRIPTION - FREQUENCY
FREQUENCY: CONTINUOUS

## 2019-04-19 ASSESSMENT — PAIN DESCRIPTION - PAIN TYPE
TYPE: SURGICAL PAIN
TYPE: SURGICAL PAIN;ACUTE PAIN
TYPE: ACUTE PAIN;SURGICAL PAIN
TYPE: ACUTE PAIN;SURGICAL PAIN

## 2019-04-19 ASSESSMENT — PAIN DESCRIPTION - DESCRIPTORS
DESCRIPTORS: ACHING

## 2019-04-19 ASSESSMENT — PAIN DESCRIPTION - ORIENTATION
ORIENTATION: RIGHT

## 2019-04-19 ASSESSMENT — PAIN DESCRIPTION - PROGRESSION
CLINICAL_PROGRESSION: NOT CHANGED
CLINICAL_PROGRESSION: GRADUALLY IMPROVING

## 2019-04-19 ASSESSMENT — PAIN - FUNCTIONAL ASSESSMENT: PAIN_FUNCTIONAL_ASSESSMENT: PREVENTS OR INTERFERES SOME ACTIVE ACTIVITIES AND ADLS

## 2019-04-19 NOTE — CONSULTS
Consults                                  CONSULTATION NOTE :ID       Patient - Júnior Mueller,  Age - 44 y.o.    - 1970      Room Number - 0Y-35/935-M   MRN -  [id number]   Acct # - [de-identified]  Date of Admission -  2019  7:40 AM  Patient's PCP: Alice Walker MD     Requesting Physician: Annabel Devlin MD    REASON FOR CONSULTATION   osteomyelites of right tibia and fibula now has AKA    HISTORY OF PRESENT ILLNESS       This is a very pleasant 44 y.o. male who was admitted to the hospital for right right above knee amputation. He had Osteomyelites of the stump of the BKA. Despite surgery he continued to have drainage and a decision was made for right AKA. He has been on iv ceftriaxone for OM and group B strep infection. He is diabetic and has poor control. He feels good at this time.  He denies any fever or chills    PAST MEDICAL  HISTORY       Past Medical History:   Diagnosis Date    Bipolar 2 disorder ([address]     previously followed with Dr. Samantha Luna and Erica Almeida in [facility] Diabetes mellitus type 2, uncontrolled ([address]     Diabetic foot ulcer ([address]     Diabetic polyneuropathy ([address]     Diabetic ulcer of right foot associated with type 2 diabetes mellitus ([address] 12/10/2015    Essential hypertension     \"never been on b/p medication that I know of\"    GERD (gastroesophageal reflux disease)     Hammer toe of left foot     Heart murmur     denies any chest pain or palpitations    History of tobacco abuse     Hx of BKA, right (HCC)     Macular edema, diabetic, bilateral ([address] 2018    Dr. 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 ([address] 2016       PAST SURGICAL HISTORY     Past Surgical History:   Procedure Laterality Date    ABSCESS DRAINAGE Right     foot    AMPUTATION ABOVE KNEE Right 2019    RIGHT ABOVE KNEE AMPUTATION  Vision Loss Maternal Grandmother     Arthritis Maternal Grandfather     Heart Disease Maternal Grandfather        REVIEW OF SYSTEMS:     Constitutional: no fever, no night sweats, no fatigue. Head: no head ache , no head injury, no migranes. Eye: no blurring of vision, no double vision. Ears: no hearing difficulty, no tinnitus  Mouth/throat: no ulceration, dental caries , dysphagia  Lungs: no cough no chest pain  CVS: no palpitation, no chest pain, no shortness of breath  GI: no abdominal pain, no nausea , no vomiting, no constipation  ISMAEL: no dysuria, frequency and urgency, no hematuria, no kidney stones  Musculoskeletal: no joint pain, swelling , stiffness,  Endocrine: no polyuria, polydipsia, no cold or heat intolerance  Hematology: no anemia, no easy brusing or bleeding, no hx of clotting disorder  Dermatology: no skin rash, no eczema, no pruritis,  Psychiatry: hx of bipolar disorder    PHYSICAL EXAM:     BP (!) 155/77   Pulse 110   Temp 98.5 °F (36.9 °C) (Oral)   Resp 16   Ht 5' 6\" (1.676 m)   Wt 222 lb (100.7 kg) Comment: taken 2/2019  SpO2 94%   BMI 35.83 kg/m²   General:  Awake, alert, not in distress. HEENT: pink conjunctiva, unicteric sclera, moist oral mucosa. Chest:   clear  Cardiovascular:  RRR ,S1S2, no murmur or gallop. Abdomen:  Soft, non tender to palpation. Extremities: dressed right above knee amputation  Skin:  Warm and dry. CNS: oriented.         LABS:     CBC:   Recent Labs     04/19/19  0800   WBC 13.2*   HGB 11.0*       Glucose:  Recent Labs     04/18/19  1129 04/18/19  2008 04/19/19  0621   POCGLU 306* 362* 241*     Micro:   Lab Results   Component Value Date    BC No growth-preliminary 04/16/2019       Problem list of patient      Patient Active Problem List   Diagnosis Code    Status post below knee amputation of right lower extremity ([facility] Z89.511    Gait disturbance R26.9    Sebaceous cyst L72.3    Uncontrolled type 2 diabetes mellitus with hyperglycemia, with long-term current use of insulin ([facility]) E11.65, Z79.4    Severe bipolar II disorder, recent episode major depressive, remission ([facility]) F31.81    Bipolar 1 disorder ([facility]) F31.9    Leukocytosis D72.829    Hyponatremia E87.1    Normocytic anemia D64.9    Obesity (BMI 30-39. 9) E66.9    Infection of below knee amputation stump ([facility]) T87.40    History of noncompliance with medical treatment Z91.19    Essential hypertension I10    Diabetic ulcer of right lower leg ([facility]) E11.622, L97.919    Hx of right BKA ([facility]) Z89.511    Tobacco dependence F17.200    History of osteomyelitis Z87.39    Gastroesophageal reflux disease without esophagitis K21.9    History of marijuana use Z87.898    Abscess of right leg L02.415    BKA stump complication ([facility]) I16.4    Physical debility R53.81    Diabetic ulcer of left fifth toe ([facility]) E11.621, L97.529    Cellulitis of fifth toe, left L03.032    Right BKA infection ([facility]) T87.43    Cellulitis of right lower extremity L03.115    Anemia D64.9    Electrolyte imbalance E87.8    DM type 2, uncontrolled, with lower extremity ulcer ([facility]) E11.622, E11.65, L97.909    Weakness R53.1    Deep postoperative wound infection T81.42XA    Infective myositis of right lower leg M60.061    Pyogenic inflammation of bone ([facility]) M86.9    Cellulitis and abscess of leg L03.119, L02.419    Infection of above knee amputation stump of right leg ([facility]) T87.43           Impression and Recommendation:   Right AKA due to non healing stump with OM: continue current antibiotic  Recent streptococcus bacteremia  DM not well controlled  Infection Control:   Standard precautions  Discharge Planning (Coordination of out patient care:   He needs rehabilitation prior to going home  Thank you Ekta Srivastava MD for allowing me to participate in this patient's care.     Donna Miramontes MD,FACP 4/19/2019 8:24 AM

## 2019-04-19 NOTE — PROGRESS NOTES
Units, Subcutaneous, TID AC  mirtazapine (REMERON) tablet 15 mg, 15 mg, Oral, Nightly    REVIEW OF SYSTEMS:  Constitutional: Denies any fever, chills. Derm: Denies any rash or skin color change. Eyes: Denies blurred or decreased in vision. Ent: Denies any tinnitus or vertigo. Resp: Denies any cough or shortness of breath. CV: Denies any syncope, palpitations or chest pain. GI:  Denies any abdominal pain, nausea, vomiting, constipation or diarrhea. : Denies any hematuria, hesitancy, or dysuria. Heme/Lymph: Denies any bleeding. Musculoskeletal: Denies numbness and tingling RLE, pain R stump   Neuro: Denies any dizziness, paresthesia or weakness.     OBJECTIVE    Patient Vitals for the past 24 hrs:   BP Temp Temp src Pulse Resp SpO2   04/19/19 0751 (!) 155/77 98.5 °F (36.9 °C) Oral 110 16 94 %   04/19/19 0400 (!) 160/74 98.3 °F (36.8 °C) Oral 106 16 96 %   04/19/19 0000 (!) 140/66 98.6 °F (37 °C) Oral 105 16 97 %   04/18/19 2000 (!) 153/78 98.4 °F (36.9 °C) Oral 99 18 97 %   04/18/19 1400 (!) 169/87 -- -- 99 -- 94 %   04/18/19 1345 (!) 162/86 -- -- 96 -- 96 %   04/18/19 1330 (!) 159/86 -- -- 96 -- 91 %   04/18/19 1315 (!) 161/85 -- -- 98 -- 96 %   04/18/19 1259 (!) 167/81 98.4 °F (36.9 °C) Oral 98 12 94 %   04/18/19 1235 (!) 146/70 -- -- 94 11 99 %   04/18/19 1230 (!) 146/68 -- -- 94 12 98 %   04/18/19 1225 (!) 152/64 -- -- 95 14 94 %   04/18/19 1220 132/72 -- -- 95 14 100 %   04/18/19 1215 (!) 149/81 -- -- 93 9 100 %   04/18/19 1210 (!) 150/79 -- -- 93 9 99 %   04/18/19 1205 (!) 153/82 -- -- 93 8 99 %   04/18/19 1200 (!) 154/83 -- -- 93 12 99 %   04/18/19 1155 (!) 168/83 -- -- 93 13 100 %   04/18/19 1150 (!) 160/81 -- -- 92 8 99 %   04/18/19 1145 (!) 160/78 -- -- 92 11 100 %   04/18/19 1140 133/65 -- -- 92 9 100 %   04/18/19 1135 136/69 -- -- 91 9 98 %   04/18/19 1130 136/63 -- -- 91 15 98 %   04/18/19 1125 -- -- -- -- -- 99 %   04/18/19 1121 (!) 166/81 99 °F (37.2 °C) Temporal 89 16 99 %     INTAKE/OUTPUT: Intake/Output Summary (Last 24 hours) at 4/19/2019 1109  Last data filed at 4/19/2019 0600  Gross per 24 hour   Intake 2601.1 ml   Output 650 ml   Net 1951.1 ml     I/O last 3 completed shifts: In: 2601.1 [P.O.:900; I.V.:1701.1]  Out: 650 [Urine:600; Blood:50]    PHYSICAL EXAM:  General appearance:  Alert and oriented x 3. No apparent distress, appears stated age and cooperative. HEENT:  Normal cephalic, atraumatic without obvious deformity. Conjunctivae/corneas clear. Neck: Supple, with full range of motion. Trachea midline. Respiratory:  Normal respiratory effort. No audible Wheezes and Rhonchi. Cardiovascular:  Regular rate and rhythm. Abdomen: Soft, non-tender, non-distended. Musculoskeletal: RLE:   good range of motion without deformity right hip. Pt can flex and extend right hip. Right stump drsg dry and intact. Skin: Skin color normal.  No rashes or lesions. Neurologic:  Neurovascularly intact without any focal sensory/motor deficits. Sensation intact. Capillary Refill: Brisk,< 3 seconds   Peripheral Pulses: +2 palpable, equal bilaterally    Data  CBC:   Lab Results   Component Value Date    WBC 13.2 04/19/2019    HGB 11.0 04/19/2019     04/19/2019     BMP:    Lab Results   Component Value Date     04/19/2019    K 4.0 04/19/2019    K 4.2 04/03/2019    CL 97 04/19/2019    CO2 24 04/19/2019    BUN 10 04/19/2019    CREATININE 0.6 04/19/2019    CALCIUM 8.6 04/19/2019    GLUCOSE 229 04/19/2019     Uric Acid:  No components found for: URIC  PT/INR:    Lab Results   Component Value Date    PROTIME 12.2 01/03/2019    INR 1.06 01/03/2019     Troponin:  No results found for: TROPONINI  Urine Culture:  No components found for: CURINE    ASSESSMENT:  Active Hospital Problems    Diagnosis Date Noted    Infection of above knee amputation stump of right leg ([facility] [T87.43] 04/18/2019       PLAN  1. Dry drsg changes as needed   2. NWB RLE  3. PT/OT to eval and treat   4.  Continue current medical management   5.  Post-op plan rehab vs TCU      Electronically signed by MARILYN Sandoval CNP on 4/19/2019 at 11:05 AM

## 2019-04-19 NOTE — PROGRESS NOTES
Lenyenidchance Villarreal 58  INPATIENT OCCUPATIONAL THERAPY  [facility] ORTHOPEDICS 7K  EVALUATION    Time:  Time In: 820  Time Out: 0751  Timed Code Treatment Minutes: 25 Minutes  Minutes: 38          Date: 2019  Patient Name: Briana Hines,   Gender: male      MRN: [id number]  : 1970  (44 y.o.)  Referring Practitioner: DEBORA Moss CNP  Diagnosis: infection of above knee amputation stump of right leg  Additional Pertinent Hx: Pt s/p right AKA on 19.      Restrictions/Precautions:  Fall Risk, Weight Bearing     Right Lower Extremity Weight Bearing: Non Weight Bearing              Other position/activity restrictions: right AKA        Past Medical History:   Diagnosis Date    Bipolar 2 disorder ([address]     previously followed with Dr. Cata Luu and Saloni Montague in [facility] Diabetes mellitus type 2, uncontrolled ([address]     Diabetic foot ulcer ([address]     Diabetic polyneuropathy ([address]     Diabetic ulcer of right foot associated with type 2 diabetes mellitus ([address] 12/10/2015    Essential hypertension     \"never been on b/p medication that I know of\"    GERD (gastroesophageal reflux disease)     Hammer toe of left foot     Heart murmur     denies any chest pain or palpitations    History of tobacco abuse     Hx of BKA, right (HCC)     Macular edema, diabetic, bilateral ([address] 2018    Dr. 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 Objective        Cognition Comment: Pt with decreased safety awareness wanting to complete tasks his way in no regards to therapist education on safety. Sensation  Overall Sensation Status: WNL                           LUE AROM (degrees)  LUE AROM : WNL          RUE AROM (degrees)  RUE AROM : WNL       LUE Strength  Gross LUE Strength: WFL                RUE Strength  Gross RUE Strength: WFL                     RUE Tone: Normotonic  LUE Tone: Normotonic                    ADL  Grooming: Stand by assistance(sitting EOB )  UE Dressing: Stand by assistance(sitting EOB donning overhead shirt)  LE Dressing: Moderate assistance(assistance to thread clothing (shorts and depends)  over left leg )  Toileting: Stand by assistance(use of urinal; sitting EOB )     Bed mobility  Supine to Sit: Stand by assistance(going out to the right side of bed)    Transfers  Stand Pivot Transfers: Contact guard assistance(from bed to wheelchair leading to pts left side  with pt refusing to allow therapist to don gait belt. )    Balance  Sitting Balance: Stand by assistance  Standing Balance: Contact guard assistance(with 1 UE support on bed rail )     Time: x 30 sec x 2 trials   Activity: clothign management  Comment: Pt refusing to use walker to stand at this time, Pt using right UE on bed rail during clothign management                                                                           Activity Tolerance:  Activity Tolerance: Patient Tolerated treatment well  Activity Tolerance: Pt educatedo ns afety awareness while sitting EOB completing ADL tasks. Pt rqeuiring max cues to follow through with pt having no regards to education. Pt requiring assistance for LB ADL atsks and balance during clothign mangement. Assessment:  Assessment: Pt s/p AKA with decreased ability tocomplete ADL tasks and mobility at [facility]. Pt demo decreased safety awareness during session as well.  tp would benefit from skilled OTs ervices to increase his indep wiht ADL atsks and to educate on safety awarenss during ADL tasks and transfers. Performance deficits / Impairments: Decreased ADL status, Decreased safe awareness, Decreased balance  Prognosis: Good    Clinical Decision Making: Clinical Decision making was of Moderate Complexity as the result of analysis of data from a detailed assessment, a consideration of several treatment options, the presence of comorbidities affecting the plan of care and the need for minimal to moderate modifications or assistance required to complete the evaluation. Discharge Recommendations:  Discharge Recommendations: IP Rehab    Patient Education:  Patient Education: OT POC, safety with ADL tasks and tranfers   Barriers to Learning: decreased insight to safety awareness     Equipment Recommendations:  Equipment Needed: No    Safety:  Safety Devices in place: Yes  Type of devices: All fall risk precautions in place, Nurse notified, Gait belt, Call light within reach, Patient at risk for falls(left in wheelchair with therapist educated RN on concerns for pt attempting to transfe himself.  RN stating she will put a alarm on him if needed. )    Plan:  Times per week: 5x  Current Treatment Recommendations: Patient/Caregiver Education & Training, Strengthening, Balance Training, Functional Mobility Training, Safety Education & Training, Self-Care / ADL    Goals:  Patient goals : get stronger to go home     Short term goals  Time Frame for Short term goals: 2 weeks   Short term goal 1: Pt to compelte LB dressing with min A   Short term goal 2: Pt to dmeo toilet transfer and tasks with min A   Short term goal 3: pt to complete pivot transfers from various surfaces with CGA and no vcs for safety to icnrease indepw ith ADL tasks   Long term goals  Time Frame for Long term goals : not est d/t ELOS     Evaluation Complexity: Based on the findings of patient history, examination, clinical presentation, and decision making during this evaluation, this patient is of medium complexity.

## 2019-04-19 NOTE — PLAN OF CARE
Problem: Falls - Risk of:  Goal: Will remain free from falls  Description  Will remain free from falls  4/19/2019 0932 by Pepe Campoverde RN  Outcome: Ongoing  Note:   No falls noted this shift, patient uses call light appropriately and waits for staff prior to transferring self (so far this shift). Patient able to voice needs appropriately. Transfers are steady with staff assistance. Patient is independent with set-up assistance with most ADLs. 4/19/2019 0447 by Jossue Virgen RN  Outcome: Ongoing  Note:   Has not been up out of bed,no falls, and he has not set off bed alarm this shift     Problem: Pain:  Goal: Pain level will decrease  Description  Pain level will decrease  4/19/2019 0932 by Pepe Campoverde RN  Outcome: Ongoing  Note:   Patient verbalizes pain is able to be controlled with prn pain medications, pain goal of 4 / 10 is achieved. Receiving PO PRN Norco, PO PRN Flexeril, and IV PRN Morphine. Repositioning and elevation are also effective for pain control. 4/19/2019 0447 by Jossue Virgen RN  Outcome: Ongoing  Note:   Having post op pain in the right stump taking oral pain medications and states he is meeting his pain goal of a 5/10     Problem: Neurological  Goal: Maximum potential motor/sensory/cognitive function  4/19/2019 0932 by Pepe Campoverde RN  Outcome: Ongoing  Note:   Patient is A+O x 4, states numbness and tingling to all extremities, and is able to move all extremities equal and moderately strong. 4/19/2019 0447 by Jossue Virgen RN  Outcome: Ongoing  Note:   He is alert and oriented      Problem: Cardiovascular  Goal: No DVT, peripheral vascular complications  9/67/7018 0932 by Pepe Campoverde RN  Outcome: Ongoing  Note:   Patient denies pain to left calf, no redness, swelling, nor warmth noted. Patient is compliant with SCDs.     4/19/2019 0447 by Jossue Virgen RN  Outcome: Ongoing  Note:   Denies any chest or calf pain no SOB at rest Problem: Respiratory  Goal: No pulmonary complications  2/45/6626 0932 by Ana Stephens RN  Outcome: Ongoing  Note:   Patient is breathing regular and unlabored, lung sounds are clear and diminished throughout, denies SOB, SpO2 remains above 92% on room air. 4/19/2019 0447 by Ortiz Grajeda RN  Outcome: Ongoing  Note:   Lungs clear and dim in bases. Sat's in the mid to upper 90's on room air     Problem: GI  Goal: No bowel complications  1/32/9283 0932 by Ana Stephens RN  Outcome: Ongoing  Note:   Patient denies n/v/d, no distention noted, bowel sounds are hypoactive x 4. Patient states is passing flatus. 4/19/2019 0447 by Ortiz Grajeda RN  Outcome: Ongoing  Note:   Abdomen soft active bowel sounds passing flatus no BM postop      Problem:   Goal: No urinary complication  7/62/0099 6701 by Ana Stephens RN  Outcome: Ongoing  Note:   Patient voiding adequate amounts of clear yellow urine, patient is continent. 4/19/2019 0447 by Ortiz Grajeda RN  Outcome: Ongoing  Note:   Voiding adequate amounts of clear yellow urine      Problem: Nutrition  Goal: Optimal nutrition therapy  4/19/2019 0932 by Ana Stephens RN  Outcome: Ongoing  Note:   Patient verbalizes appropriate appetite, denies n/v. Able to consume food and drink with no issues noted. Patient is on a carb control diet. 4/19/2019 0447 by Ortiz Grajeda RN  Outcome: Ongoing  Note:   Taking in adequate amounts of nutrition without difficulty      Problem: Skin Integrity/Risk  Goal: No skin breakdown during hospitalization  4/19/2019 0932 by Ana Stephens RN  Outcome: Ongoing  Note:   Patient's skin is appropriate for ethnicity, warm, and dry, with no new skin issues noted this shift. Mucous membranes are pink, moist, and intact.     4/19/2019 0447 by Ortiz Grajeda RN  Outcome: Ongoing  Note:   Surgical incision to the right stump dressing in dry and intact     Problem: DISCHARGE BARRIERS  Goal: Patient's continuum of care needs are met  4/19/2019 0932 by Elijah Nunn RN  Outcome: Ongoing  Note:   Patient's continuum of care needs continue to be met. 4/19/2019 0452 by Hardeep Montoya RN  Outcome: Ongoing  Note:   Planning inpatient rehab at discharge        Care plan reviewed with patient. Patient does verbalize understanding of the plan of care and contribute to goal setting.

## 2019-04-19 NOTE — CARE COORDINATION
Discharge planning update note:   POD 1 right above the knee amputation by Dr Leslie Harrison wants to go to [facility] IP [facility]hab. Vee, admissions coordinator consulted and is following. Will need PT/OT before physiatry will be able to eval. Continue IV antibiotics per Dr Mac Hylton. and wound VAC. Medical management per hospitalist. Pain management measures.

## 2019-04-19 NOTE — PROGRESS NOTES
[facility] [id number]    Time In: 5283  Time Out: 0945  Timed Code Treatment Minutes: 8 Minutes  Minutes: 18          Date: 2019  Patient Name: Nabil Barraza,  Gender:  male        MRN: [id number]  : 1970  (44 y.o.)      Referring Practitioner: PIPO Rojas  Diagnosis: Rt AKA  Treatment Diagnosis: Rt AKA  Additional Pertinent Hx: HX:  BKA with wound over fibula. Underwent multiple debridements with no success. Pt admitted for AKA, performed 2019.    Hx:  Db     Past Medical History:   Diagnosis Date    Bipolar 2 disorder ([address]     previously followed with Dr. Darcie Yan and Merlene Eugene in [facility] Diabetes mellitus type 2, uncontrolled ([address]     Diabetic foot ulcer ([address]     Diabetic polyneuropathy ([address]     Diabetic ulcer of right foot associated with type 2 diabetes mellitus ([address] 12/10/2015    Essential hypertension     \"never been on b/p medication that I know of\"    GERD (gastroesophageal reflux disease)     Hammer toe of left foot     Heart murmur     denies any chest pain or palpitations    History of tobacco abuse     Hx of BKA, right (HCC)     Macular edema, diabetic, bilateral ([address] 2018    Dr. Arun Valera referred to retina specialist for 2nd opinion    Marijuana abuse in remission     Onychomycosis     WD-Skin ulcer of fourth toe of right foot with necrosis of bone ([address] 2016     Past Surgical History:   Procedure Laterality Date    ABSCESS DRAINAGE Right     foot    AMPUTATION ABOVE KNEE Right 2019    RIGHT ABOVE KNEE AMPUTATION performed by Brandie Hannon MD at [address] Right 2016    I & D    INCISION AND DRAINAGE Right 2019    I&D RIGHT STUMP performed by Brandie Hannon MD at [address] Right 2016    LEG AMPUTATION BELOW KNEE Right 2019    I&D AND REVISION OF AMPUTATION RIGHT LEG performed by Liliya Jay MD at [address] Right 1/14/15    sole of foot I&D    GA DRAIN INFECT SHOULDER BURSA Left 8/18/2017    LEFT SHOULDER INCISION AND DRAINAGE performed by Liliya Jay MD at [address] OFFICE/OUTPT [facility] Right 9/20/2018    EXCISIONAL DEBRIDEMENT RIGHT BKA STUMP performed by Candelaria Poole MD at [address] Right 1/16/15    2nd toe with wound vac applied    WISDOM TOOTH EXTRACTION  ? when       Restrictions/Precautions:  Fall Risk, Weight Bearing     Right Lower Extremity Weight Bearing: Non Weight Bearing              Other position/activity restrictions: right AKA        Subjective:  Chart Reviewed: Yes  Patient assessed for rehabilitation services?: Yes  Subjective: Pt seated in w/c. Cooperative, but limited due to c/o Rt stump pain. Nurse notified for pain meds. General:  Overall Orientation Status: Within Normal Limits  Follows Commands: Within Functional Limits    Vision: Within Functional Limits    Hearing: Within functional limits         Pain:   .  Pain Assessment  Pain Level: 9(Received pain meds and flexeril during session. )       Social/Functional History:    Lives With: Other (comment)(roommate)  Type of Home: House  Home Layout: Two level, Able to Live on Main level with bedroom/bathroom  Home Access: Ramped entrance  Home Equipment: [address] Shower/Tub: Tub/Shower unit  Bathroom Toilet: Standard  Bathroom Equipment: Tub transfer bench  Bathroom Accessibility: Accessible    Receives Help From: Home health  ADL Assistance: [address]: Independent  Ambulation Assistance: (Non ambulatory)  Transfer Assistance: Independent          Additional Comments: Pt reporting he was on TCU back in Feb 2019. Pt stating he was indep with ADL tasks at home. His wheelchair will fit through all his doorways at home.        Objective:       ROM RLE: WFL  ROM LLE: [facility]    Strength RLE: Exception  Comment: Not tested due to pain    Strength LLE: WFL                     RLE Tone: Normotonic  LLE Tone: Normotonic       Balance  Sitting - Dynamic: Good(sitting edge of bed. Mod I. )         Transfers  Stand Pivot Transfers: Contact guard assistance(partial stand/pivot from w/c <->bed. Therapist supported the w/c to keep from sliding on the floor, CGA for safety.  )       Wheelchair Activities  Propulsion: Yes    Propulsion 1  Propulsion: Manual  Level: Level Tile  Method: DI ROJAS  Level of Assistance: Modified independent  Description/ Details: slow pace, due to c/o Rt stump soreness. Pt was mod I with positioning w/c and locking brakes in prep for transfer  Distance: 150 ft,       Other activities:  Discussed and demo'd the need for pt to perform side leans while seated in w/c for pressure relief. Waffle cushion obtained and put in pt's w/c. Activity Tolerance:  Activity Tolerance: Patient limited by pain    Treatment Initiated: 2nd transfer trial from bed to w/c. Pressure relief education and use of seat cushion noted above. Assessment: Body structures, Functions, Activity limitations: Decreased functional mobility   Assessment: Pt limited with mobility this session due to the pain in Rt stump. He would benefit from skilled PT to further progress his transfers, bed mobility,  initiate RLE exercises for ROM and strengthening in prep for prosthesis for gait.     Prognosis: Good    Clinical Presentation: Moderate - Evolving with Changing Characteristics: based on diagnosis, history and level of assist with evaluation    Decision Making: Low Complexitybased on patient assessment and decision making process of determining plan of care and establishing reasonable expectations for measurable functional outcomes    REQUIRES PT FOLLOW UP: Yes    Discharge Recommendations:  Discharge Recommendations: Continue to assess pending progress, Patient would benefit from continued therapy after discharge, IP Rehab    Patient Education:  Patient Education: review of side leans for pressure relief in w/c.  POC    Equipment Recommendations:  Equipment Needed: Yes(Pt has manual w/c. May need walker)    Safety:  Type of devices: Gait belt, Nurse notified, Call light within reach, Left in chair    Plan:  Times per week: 6x- O  Specific instructions for Next Treatment: Rt LE ex in prep for prosthesis, transfers, bed mobility, w/c on ramp  Current Treatment Recommendations: Strengthening, Transfer Training, Wheelchair Mobility Training, Patient/Caregiver Education & Training, Functional Mobility Training, Safety Education & Training, Positioning    Goals:  Patient goals : go to [facility] or rehab  Short term goals  Time Frame for Short term goals: 1 wk  Short term goal 1: Pt to go supine <->sit, Mod I, to get in/out of bed. Short term goal 2: Pt to get up/down to stand +1 min assist to progress in standing tolerance  Short term goal 3: Pt to perform bed <->w/c. transfers, SBA, to get in/out of w/c. Short term goal 4: Pt to propel w/c up/down 25 ft ramp, SBA for home entry. Long term goals  Time Frame for Long term goals : NA due to expected short LOS    Evaluation Complexity: Based on the findings of patient history, examination, clinical presentation, and decision making during this evaluation, the evaluation of Wendi Dickey  is of low complexity.             AM-PAC Inpatient Mobility without Stair Climbing Raw Score : 13  AM-PAC Inpatient without Stair Climbing T-Scale Score : 38.96  Mobility Inpatient CMS 0-100% Score: 58.44  Mobility Inpatient without Stair CMS G-Code Modifier : CK

## 2019-04-19 NOTE — OP NOTE
[address] [id number]                                OPERATIVE REPORT    PATIENT NAME: Faith Hills                     :        1970  MED REC NO:   [id number]                           ROOM:       0009  ACCOUNT NO:   [de-identified]                           ADMIT DATE: 2019  PROVIDER:     Siena Perez M.D.    Errol Null:  2019    PREOPERATIVE DIAGNOSIS:  Osteomyelitis, right stump, lower extremity. POSTOPERATIVE DIAGNOSIS:  Osteomyelitis, right stump, lower extremity. SURGEON:  Siena Perez M.D.    ASSISTANT:  Paradise Poole P.A.-C.    ANESTHESIA:  General.    COMPLICATIONS:  None. PROCEDURE:  Above-knee amputation. INDICATIONS:  The patient is a 49-year-old who had a below-knee  amputation in the past.  Subsequently developed a wound over the fibula. He is a poorly controlled diabetic. We did multiple debridements,  unfortunately still looks poor. I felt he would benefit from above-knee  amputation. The patient agreed. NARRATIVE:  The patient was taken to the operating room, underwent a  general anesthetic. Right lower extremity was prepped and draped in  normal sterile fashion. Timeout was taken. Consent was confirmed. Tourniquet inflated to 300 mmHg. Made a fish-mouth incision over the  distal femur. Skin knife followed by electrocautery down to the  extensor mechanism. We isolated the femur, made a cut more proximally,  removed a napkin ring of bone. We started working on our amputation  more posteriorly. We ligated the saphenous vein. We identified the  femoral artery and vein, those were double tied. Nerve was pulled, cut,  and allowed to retract. We then completed the amputation posteriorly. We felt we had an adequate amount of soft tissue. Tourniquet was let  down. Bleeders were cauterized.   We then closed deep tissue with some  absorbable suture followed by skin staples. Dry dressings. The patient  was then awakened and returned to the recovery room in good condition. POSTOPERATIVE PLAN:  He will be nonweightbearing on that side. Dry  dressings as necessary. First postop visit will be in three weeks,  clinical exam, no x-rays. MELISSA Gonzalez, assisted throughout the procedure with  positioning, draping, retraction, wound closure, dressing, and splint  application.         Greg Cooper M.D.    D: 04/18/2019 11:16:46       T: 04/18/2019 13:22:24     [email or web address]  Job#: [id number]     Doc#: [id number]    CC:

## 2019-04-20 LAB
GLUCOSE BLD-MCNC: 158 MG/DL (ref 70–108)
GLUCOSE BLD-MCNC: 159 MG/DL (ref 70–108)
GLUCOSE BLD-MCNC: 166 MG/DL (ref 70–108)
GLUCOSE BLD-MCNC: 313 MG/DL (ref 70–108)

## 2019-04-20 PROCEDURE — 6370000000 HC RX 637 (ALT 250 FOR IP): Performed by: ORTHOPAEDIC SURGERY

## 2019-04-20 PROCEDURE — 82948 REAGENT STRIP/BLOOD GLUCOSE: CPT

## 2019-04-20 PROCEDURE — 6370000000 HC RX 637 (ALT 250 FOR IP): Performed by: PHYSICIAN ASSISTANT

## 2019-04-20 PROCEDURE — 6360000002 HC RX W HCPCS: Performed by: NURSE PRACTITIONER

## 2019-04-20 PROCEDURE — 1200000000 HC SEMI PRIVATE

## 2019-04-20 PROCEDURE — 6370000000 HC RX 637 (ALT 250 FOR IP): Performed by: NURSE PRACTITIONER

## 2019-04-20 PROCEDURE — 6360000002 HC RX W HCPCS: Performed by: PHYSICIAN ASSISTANT

## 2019-04-20 PROCEDURE — 2580000003 HC RX 258: Performed by: NURSE PRACTITIONER

## 2019-04-20 RX ORDER — MORPHINE SULFATE 2 MG/ML
2 INJECTION, SOLUTION INTRAMUSCULAR; INTRAVENOUS
Status: DISCONTINUED | OUTPATIENT
Start: 2019-04-20 | End: 2019-04-22 | Stop reason: HOSPADM

## 2019-04-20 RX ORDER — OXYCODONE HYDROCHLORIDE AND ACETAMINOPHEN 5; 325 MG/1; MG/1
1 TABLET ORAL EVERY 4 HOURS PRN
Status: DISCONTINUED | OUTPATIENT
Start: 2019-04-20 | End: 2019-04-22 | Stop reason: HOSPADM

## 2019-04-20 RX ORDER — OXYCODONE HYDROCHLORIDE AND ACETAMINOPHEN 5; 325 MG/1; MG/1
2 TABLET ORAL EVERY 4 HOURS PRN
Status: DISCONTINUED | OUTPATIENT
Start: 2019-04-20 | End: 2019-04-22 | Stop reason: HOSPADM

## 2019-04-20 RX ADMIN — MORPHINE SULFATE 2 MG: 2 INJECTION, SOLUTION INTRAMUSCULAR; INTRAVENOUS at 11:01

## 2019-04-20 RX ADMIN — CYCLOBENZAPRINE HYDROCHLORIDE 10 MG: 10 TABLET, FILM COATED ORAL at 17:28

## 2019-04-20 RX ADMIN — HYDROCODONE BITARTRATE AND ACETAMINOPHEN 2 TABLET: 5; 325 TABLET ORAL at 04:04

## 2019-04-20 RX ADMIN — CYCLOBENZAPRINE HYDROCHLORIDE 10 MG: 10 TABLET, FILM COATED ORAL at 09:18

## 2019-04-20 RX ADMIN — DOCUSATE SODIUM 100 MG: 100 CAPSULE, LIQUID FILLED ORAL at 09:06

## 2019-04-20 RX ADMIN — OXYCODONE HYDROCHLORIDE AND ACETAMINOPHEN 2 TABLET: 5; 325 TABLET ORAL at 21:46

## 2019-04-20 RX ADMIN — SERTRALINE 150 MG: 100 TABLET, FILM COATED ORAL at 09:07

## 2019-04-20 RX ADMIN — INSULIN GLARGINE 55 UNITS: 100 INJECTION, SOLUTION SUBCUTANEOUS at 21:47

## 2019-04-20 RX ADMIN — MIRTAZAPINE 15 MG: 15 TABLET, FILM COATED ORAL at 21:46

## 2019-04-20 RX ADMIN — CEFTRIAXONE SODIUM 2 G: 2 INJECTION, POWDER, FOR SOLUTION INTRAMUSCULAR; INTRAVENOUS at 16:15

## 2019-04-20 RX ADMIN — INSULIN LISPRO 18 UNITS: 100 INJECTION, SOLUTION INTRAVENOUS; SUBCUTANEOUS at 16:15

## 2019-04-20 RX ADMIN — MORPHINE SULFATE 2 MG: 2 INJECTION, SOLUTION INTRAMUSCULAR; INTRAVENOUS at 19:52

## 2019-04-20 RX ADMIN — INSULIN GLARGINE 55 UNITS: 100 INJECTION, SOLUTION SUBCUTANEOUS at 09:10

## 2019-04-20 RX ADMIN — HYDROCODONE BITARTRATE AND ACETAMINOPHEN 2 TABLET: 5; 325 TABLET ORAL at 17:28

## 2019-04-20 RX ADMIN — HYDROCODONE BITARTRATE AND ACETAMINOPHEN 2 TABLET: 5; 325 TABLET ORAL at 09:06

## 2019-04-20 RX ADMIN — HYDROCODONE BITARTRATE AND ACETAMINOPHEN 2 TABLET: 5; 325 TABLET ORAL at 13:42

## 2019-04-20 RX ADMIN — INSULIN LISPRO 18 UNITS: 100 INJECTION, SOLUTION INTRAVENOUS; SUBCUTANEOUS at 11:57

## 2019-04-20 ASSESSMENT — PAIN DESCRIPTION - DESCRIPTORS: DESCRIPTORS: ACHING;DISCOMFORT

## 2019-04-20 ASSESSMENT — PAIN DESCRIPTION - FREQUENCY: FREQUENCY: CONTINUOUS

## 2019-04-20 ASSESSMENT — PAIN SCALES - GENERAL
PAINLEVEL_OUTOF10: 8
PAINLEVEL_OUTOF10: 8
PAINLEVEL_OUTOF10: 10
PAINLEVEL_OUTOF10: 9
PAINLEVEL_OUTOF10: 10
PAINLEVEL_OUTOF10: 8
PAINLEVEL_OUTOF10: 10
PAINLEVEL_OUTOF10: 7
PAINLEVEL_OUTOF10: 8
PAINLEVEL_OUTOF10: 8
PAINLEVEL_OUTOF10: 10
PAINLEVEL_OUTOF10: 7
PAINLEVEL_OUTOF10: 10

## 2019-04-20 ASSESSMENT — PAIN DESCRIPTION - PROGRESSION: CLINICAL_PROGRESSION: GRADUALLY IMPROVING

## 2019-04-20 ASSESSMENT — PAIN DESCRIPTION - LOCATION: LOCATION: OTHER (COMMENT)

## 2019-04-20 ASSESSMENT — PAIN - FUNCTIONAL ASSESSMENT: PAIN_FUNCTIONAL_ASSESSMENT: PREVENTS OR INTERFERES SOME ACTIVE ACTIVITIES AND ADLS

## 2019-04-20 ASSESSMENT — PAIN DESCRIPTION - ORIENTATION: ORIENTATION: RIGHT

## 2019-04-20 ASSESSMENT — PAIN DESCRIPTION - ONSET: ONSET: ON-GOING

## 2019-04-20 ASSESSMENT — PAIN DESCRIPTION - PAIN TYPE: TYPE: SURGICAL PAIN

## 2019-04-20 NOTE — PLAN OF CARE
Problem: Falls - Risk of:  Goal: Will remain free from falls  Description  Will remain free from falls  Outcome: Ongoing  Note:   Transfers to the wheelchair with minimal assist no falls this shift using call light and hourly rounding when assistance is needed     Problem: Pain:  Goal: Pain level will decrease  Description  Pain level will decrease  Outcome: Ongoing  Note:   Having postop pain in the right stump taking oral medications and states this is helping to reach pain goal of a 5/10     Problem: Neurological  Goal: Maximum potential motor/sensory/cognitive function  Outcome: Ongoing  Note:   Awake and alert no neuro deficits noted      Problem: Cardiovascular  Goal: No DVT, peripheral vascular complications  Outcome: Ongoing  Note:   Denies chest or calf pain no SOB     Problem: Respiratory  Goal: No pulmonary complications  Outcome: Ongoing  Note:   Lungs clear T/O sat's mid to high 90's on room air      Problem: GI  Goal: No bowel complications  Outcome: Ongoing  Note:   Abdomen soft active bowel sounds passing flatus no bm yet postop      Problem:   Goal: No urinary complication  Outcome: Ongoing  Note:   Voiding adequate amounts of clear yellow urine      Problem: Nutrition  Goal: Optimal nutrition therapy  Outcome: Ongoing  Note:   Taking in adequate amounts of nutrition      Problem: Skin Integrity/Risk  Goal: No skin breakdown during hospitalization  Outcome: Ongoing  Note:   Surgical incision to the right stump no evidence of drainage noted on the dressing     Problem: DISCHARGE BARRIERS  Goal: Patient's continuum of care needs are met  Outcome: Ongoing  Note:   Planning rehab at discharge   Care plan reviewed with patient. Patient verbalize understanding of the plan of care and contribute to goal setting.

## 2019-04-20 NOTE — FLOWSHEET NOTE
04/20/19 1610   Encounter Summary   Services provided to: Patient   Referral/Consult From: [address] Family members; [employer]/bety community   [facility]   Contact [employer] Completed   Continue Visiting Yes  (4/20 continue support)   Complexity of Encounter Low   Length of Encounter 15 minutes   Spiritual/Mandaen   Type Spiritual support   Assessment Approachable   Intervention Active listening;Explored feelings, thoughts, concerns;Explored coping resources;Nurtured hope;Prayer   Outcome Expressed gratitude;Engaged in conversation;Coping     Pt was seating in wheelchair. Pt shared that he had 3 surgeries done on his leg. The last surgery was to amputate above the knee. He shared he is feeling better. He is ready to start rehab and hopes this will happen soon. Spiritual Care will continue to provide support to pt.

## 2019-04-20 NOTE — PLAN OF CARE
Patient voiding adequate amounts of urine per shift. 4/20/2019 0230 by Gabriel Mcmanus RN  Outcome: Ongoing  Note:   Voiding adequate amounts of clear yellow urine      Problem: Nutrition  Goal: Optimal nutrition therapy  4/20/2019 0230 by Gabriel Mcmanus RN  Outcome: Ongoing  Note:   Taking in adequate amounts of nutrition      Problem: Skin Integrity/Risk  Goal: No skin breakdown during hospitalization  4/20/2019 1007 by Manoj Arellano RN  Outcome: Ongoing  Note:   Dressing dry and intact. Unable to visualize surgical incision due to surgical dressing. Dressing not to be removed today. No fevers, tachycardia, hypotension noted. Pt denies any complaints   4/20/2019 0230 by Gabriel Mcmanus RN  Outcome: Ongoing  Note:   Surgical incision to the right stump no evidence of drainage noted on the dressing     Problem: DISCHARGE BARRIERS  Goal: Patient's continuum of care needs are met  4/20/2019 1007 by Manoj Arellano RN  Outcome: Ongoing  Note:   Pt plans rehab at discharge. Care manager and social working helping with discharge needs. 4/20/2019 0230 by Gabriel Mcmanus RN  Outcome: Ongoing  Note:   Planning rehab at discharge     Care plan reviewed with patient. Patient verbalize understanding of the plan of care and contribute to goal setting.

## 2019-04-20 NOTE — PROGRESS NOTES
Orthopedic Progress Note    Amanda Huang  4/20/2019    Subjective:     Post-Operative Day # 2 s/p right above knee amputation. Systemic or Specific Complaints: No unusual complaints    Objective:     /66   Pulse 112   Temp 98 °F (36.7 °C) (Oral)   Resp 16   Ht 5' 6\" (1.676 m)   Wt 222 lb (100.7 kg) Comment: taken 2/2019  SpO2 95%   BMI 35.83 kg/m²     Intake/Output Summary (Last 24 hours) at 4/20/2019 8292  Last data filed at 4/20/2019 0600  Gross per 24 hour   Intake 680 ml   Output 1250 ml   Net -570 ml     DRAIN/TUBE OUTPUT:       General: alert, appears stated age and cooperative   Wound: Wound clean and dry, no evidence of infection   Extremity: Distal NVI   DVT Exam: No evidence of DVT by physical exam     Data Review  CBC:   Lab Results   Component Value Date    WBC 13.2 04/19/2019    RBC 3.85 04/19/2019    HGB 11.0 04/19/2019    HCT 31.9 04/19/2019     04/19/2019     BMP:    Lab Results   Component Value Date     04/19/2019    K 4.0 04/19/2019    K 4.2 04/03/2019    CL 97 04/19/2019    CO2 24 04/19/2019    BUN 10 04/19/2019    CREATININE 0.6 04/19/2019    CALCIUM 8.6 04/19/2019    GLUCOSE 229 04/19/2019     PT/INR:    Lab Results   Component Value Date    PROTIME 12.2 01/03/2019    INR 1.06 01/03/2019       Radiology: See electronic record to view reports. Reports reviewed. Assessment:     Status Post right above knee amputation surgery, doing well, stable. Plan:      1: Continues current post-op course   2:  Continue pain control  3:  Physical therapy as per protocol, NWB RLE  4:  Anticipate discharge to rehab or TCU  5: ID on board, IV antibiotics per ID, appreciate recommendations. 6: Dry dressing changes as needed.     Electronically signed by Yanet Alatorre PA-C on 4/20/2019 at 9:37 AM

## 2019-04-21 LAB
BLOOD CULTURE, ROUTINE: NORMAL
BLOOD CULTURE, ROUTINE: NORMAL
GLUCOSE BLD-MCNC: 106 MG/DL (ref 70–108)
GLUCOSE BLD-MCNC: 189 MG/DL (ref 70–108)
GLUCOSE BLD-MCNC: 223 MG/DL (ref 70–108)
GLUCOSE BLD-MCNC: 235 MG/DL (ref 70–108)
GLUCOSE BLD-MCNC: 82 MG/DL (ref 70–108)

## 2019-04-21 PROCEDURE — 82948 REAGENT STRIP/BLOOD GLUCOSE: CPT

## 2019-04-21 PROCEDURE — 6370000000 HC RX 637 (ALT 250 FOR IP): Performed by: NURSE PRACTITIONER

## 2019-04-21 PROCEDURE — 6370000000 HC RX 637 (ALT 250 FOR IP): Performed by: PHYSICIAN ASSISTANT

## 2019-04-21 PROCEDURE — 6360000002 HC RX W HCPCS: Performed by: NURSE PRACTITIONER

## 2019-04-21 PROCEDURE — 6360000002 HC RX W HCPCS: Performed by: PHYSICIAN ASSISTANT

## 2019-04-21 PROCEDURE — 2580000003 HC RX 258: Performed by: NURSE PRACTITIONER

## 2019-04-21 PROCEDURE — 1200000000 HC SEMI PRIVATE

## 2019-04-21 PROCEDURE — 6370000000 HC RX 637 (ALT 250 FOR IP): Performed by: ORTHOPAEDIC SURGERY

## 2019-04-21 RX ADMIN — MIRTAZAPINE 15 MG: 15 TABLET, FILM COATED ORAL at 20:23

## 2019-04-21 RX ADMIN — INSULIN LISPRO 18 UNITS: 100 INJECTION, SOLUTION INTRAVENOUS; SUBCUTANEOUS at 09:21

## 2019-04-21 RX ADMIN — OXYCODONE HYDROCHLORIDE AND ACETAMINOPHEN 2 TABLET: 5; 325 TABLET ORAL at 12:19

## 2019-04-21 RX ADMIN — INSULIN GLARGINE 55 UNITS: 100 INJECTION, SOLUTION SUBCUTANEOUS at 21:21

## 2019-04-21 RX ADMIN — MORPHINE SULFATE 2 MG: 2 INJECTION, SOLUTION INTRAMUSCULAR; INTRAVENOUS at 09:21

## 2019-04-21 RX ADMIN — MORPHINE SULFATE 2 MG: 2 INJECTION, SOLUTION INTRAMUSCULAR; INTRAVENOUS at 23:46

## 2019-04-21 RX ADMIN — OXYCODONE HYDROCHLORIDE AND ACETAMINOPHEN 2 TABLET: 5; 325 TABLET ORAL at 06:04

## 2019-04-21 RX ADMIN — CYCLOBENZAPRINE HYDROCHLORIDE 10 MG: 10 TABLET, FILM COATED ORAL at 01:47

## 2019-04-21 RX ADMIN — SERTRALINE 150 MG: 100 TABLET, FILM COATED ORAL at 09:21

## 2019-04-21 RX ADMIN — OXYCODONE HYDROCHLORIDE AND ACETAMINOPHEN 2 TABLET: 5; 325 TABLET ORAL at 01:47

## 2019-04-21 RX ADMIN — INSULIN GLARGINE 55 UNITS: 100 INJECTION, SOLUTION SUBCUTANEOUS at 09:21

## 2019-04-21 RX ADMIN — OXYCODONE HYDROCHLORIDE AND ACETAMINOPHEN 2 TABLET: 5; 325 TABLET ORAL at 20:23

## 2019-04-21 RX ADMIN — CEFTRIAXONE SODIUM 2 G: 2 INJECTION, POWDER, FOR SOLUTION INTRAMUSCULAR; INTRAVENOUS at 15:53

## 2019-04-21 RX ADMIN — OXYCODONE HYDROCHLORIDE AND ACETAMINOPHEN 2 TABLET: 5; 325 TABLET ORAL at 16:19

## 2019-04-21 RX ADMIN — INSULIN LISPRO 18 UNITS: 100 INJECTION, SOLUTION INTRAVENOUS; SUBCUTANEOUS at 12:16

## 2019-04-21 ASSESSMENT — PAIN SCALES - GENERAL
PAINLEVEL_OUTOF10: 8
PAINLEVEL_OUTOF10: 8
PAINLEVEL_OUTOF10: 7
PAINLEVEL_OUTOF10: 7
PAINLEVEL_OUTOF10: 9
PAINLEVEL_OUTOF10: 8
PAINLEVEL_OUTOF10: 7
PAINLEVEL_OUTOF10: 8

## 2019-04-21 ASSESSMENT — PAIN DESCRIPTION - PAIN TYPE: TYPE: SURGICAL PAIN

## 2019-04-21 NOTE — PROGRESS NOTES
Progress note: Infectious diseases    Patient - Amanda Huang,  Age - 44 y.o.    - 1970      Room Number - 5N-45/368-W   MRN -  [id number]   [facility]  Date of Admission -  2019  7:40 AM    SUBJECTIVE:   No new issues  OBJECTIVE   VITALS    height is 5' 6\" (1.676 m) and weight is 222 lb (100.7 kg). His oral temperature is 98.4 °F (36.9 °C). His blood pressure is 106/63 and his pulse is 114. His respiration is 18 and oxygen saturation is 96%. Wt Readings from Last 3 Encounters:   19 222 lb (100.7 kg)   19 222 lb (100.7 kg)   19 222 lb (100.7 kg)       I/O (24 Hours)    Intake/Output Summary (Last 24 hours) at 2019 1100  Last data filed at 2019 1039  Gross per 24 hour   Intake 700 ml   Output 3700 ml   Net -3000 ml       General Appearance  Awake, alert, oriented,  not  In acute distress  HEENT - normocephalic, atraumatic, pink conjunctiva,  anicteric sclera  Neck - Supple, no mass  Lungs -  Bilateral   air entry,   Cardiovascular - Heart sounds are normal.  Regular rate and rhythm without murmur, gallop or rub.   Abdomen - soft, not distended, nontender,   Neurologic - oriented  Skin - No bruising or bleeding  Extremities - dressed right above knee amputation    MEDICATIONS:      docusate sodium  100 mg Oral Daily    sertraline  150 mg Oral Daily    cefTRIAXone (ROCEPHIN) IV  2 g Intravenous Q24H    insulin glargine  55 Units Subcutaneous BID    insulin lispro  18 Units Subcutaneous TID AC    mirtazapine  15 mg Oral Nightly      dextrose      sodium chloride Stopped (19 0121)     morphine, oxyCODONE-acetaminophen, oxyCODONE-acetaminophen, glucose, dextrose, glucagon (rDNA), dextrose, cyclobenzaprine, ondansetron, magnesium hydroxide, acetaminophen      LABS:     CBC:   Recent Labs     19  0800   WBC 13.2*   HGB 11.0*        BMP:    Recent Labs 04/19/19  0800   *   K 4.0   CL 97*   CO2 24   BUN 10   CREATININE 0.6   GLUCOSE 229*     Calcium:  Recent Labs     04/19/19  0800   CALCIUM 8.6         Problem list of patient:     Patient Active Problem List   Diagnosis Code    Status post below knee am[facility] of right lower [facility] Z89.511    Gait disturbance R26.9    Sebaceous cyst L72.3    Uncontrolled type 2 diabetes mellitus with hyperglycemia, with long-term current use of insulin ([facility]) E11.65, Z79.4    Severe bipolar II disorder, recent episode major depressive, remission ([facility]) F31.81    Bipolar 1 disorder ([facility]) F31.9    Leukocytosis D72.829    Hyponatremia E87.1    Normocytic anemia D64.9    Obesity (BMI 30-39. 9) E66.9    Infection of below knee amputation stump ([facility]) T87.40    History of noncompliance with medical treatment Z91.19    Essential hypertension I10    Diabetic ulcer of right lower leg ([facility]) E11.622, L97.919    Hx of right BKA ([facility]) Z89.511    Tobacco dependence F17.200    History of osteomyelitis Z87.39    Gastroesophageal reflux disease without esophagitis K21.9    History of marijuana use Z87.898    Abscess of right leg L02.415    BKA stump complication ([facility]) R19.1    Physical debility R53.81    Diabetic ulcer of left fifth toe ([facility]) E11.621, L97.529    Cellulitis of fifth toe, left L03.032    Right BKA infection ([facility]) T87.43    Cellulitis of right lower extremity L03.115    Anemia D64.9    Electrolyte imbalance E87.8    DM type 2, uncontrolled, with lower extremity ulcer ([facility]) E11.622, E11.65, L97.909    Weakness R53.1    Deep postoperative wound infection T81.42XA    Infective myositis of right lower leg M60.061    Pyogenic inflammation of bone ([facility]) M86.9    Cellulitis and abscess of leg L03.119, L02.419    Infection of above knee amputation stump of right leg ([facility]) T87.43         ASSESSMENT/PLAN     Right above knee amputation due to Osteomyelites of stump(BKA)  Streptococcus bacteremia  Continue current treatment.       Radha Richardson MD, FACP 4/21/2019 11:00 AM

## 2019-04-21 NOTE — PROGRESS NOTES
Orthopedic Progress Note    Miguel Isaac  4/21/2019    Subjective:     Post-Operative Day # 3 s/p right above knee amputation. Systemic or Specific Complaints: No unusual complaints    Objective:     /63   Pulse 114   Temp 98.4 °F (36.9 °C) (Oral)   Resp 18   Ht 5' 6\" (1.676 m)   Wt 222 lb (100.7 kg) Comment: taken 2/2019  SpO2 96%   BMI 35.83 kg/m²     Intake/Output Summary (Last 24 hours) at 4/21/2019 1544  Last data filed at 4/21/2019 0600  Gross per 24 hour   Intake 700 ml   Output 3525 ml   Net -2825 ml     DRAIN/TUBE OUTPUT:       General: alert, appears stated age and cooperative   Wound: Wound clean and dry, no evidence of infection   Extremity: Distal NVI   DVT Exam: No evidence of DVT by physical exam     Data Review  CBC:   Lab Results   Component Value Date    WBC 13.2 04/19/2019    RBC 3.85 04/19/2019    HGB 11.0 04/19/2019    HCT 31.9 04/19/2019     04/19/2019     BMP:    Lab Results   Component Value Date     04/19/2019    K 4.0 04/19/2019    K 4.2 04/03/2019    CL 97 04/19/2019    CO2 24 04/19/2019    BUN 10 04/19/2019    CREATININE 0.6 04/19/2019    CALCIUM 8.6 04/19/2019    GLUCOSE 229 04/19/2019     PT/INR:    Lab Results   Component Value Date    PROTIME 12.2 01/03/2019    INR 1.06 01/03/2019       Radiology: See electronic record to view reports. Reports reviewed. Assessment:     Status Post right above knee amputation surgery, doing well, stable. Plan:      1: Continue current post-operative management   2:  Continue pain control  3:  Physical therapy as per protocol, NWB RLE  4:  Anticipate discharge to rehab or TCU  5: ID on board, IV antibiotics per ID, appreciate recommendations. 6: Dry dressing changes as needed.      Electronically signed by Felice Hartley PA-C on 4/21/2019 at 9:26 AM

## 2019-04-21 NOTE — PLAN OF CARE
Problem: Falls - Risk of:  Goal: Will remain free from falls  Description  Will remain free from falls  4/20/2019 2255 by Hilda Barker RN  Outcome: Ongoing  Note:   Transfers to W/C with little assistance using call light and hourly rounding when assistance is needed no falls this shift      Problem: Pain:  Goal: Pain level will decrease  Description  Pain level will decrease  4/20/2019 2255 by Hilda Barker RN  Outcome: Ongoing  Note:   Having pain in the right stump taking oral and IV medciaitons and states he is achieving some comfort pain goal 6/10     Problem: Neurological  Goal: Maximum potential motor/sensory/cognitive function  Outcome: Ongoing  Note:   Alert and oriented flat affect. No other neuro deficits noted     Problem: Cardiovascular  Goal: No DVT, peripheral vascular complications  Outcome: Ongoing  Note:   Denies chest or calf pain no SOB     Problem: Respiratory  Goal: No pulmonary complications  Outcome: Ongoing  Note:   Lungs clear T/O sat's mid to high 90's on room air     Problem: GI  Goal: No bowel complications  7/33/5864 2255 by Hilda Barker RN  Outcome: Ongoing  Note:   Abdomen soft active bowel sounds passing flatus no BM yet postop      Problem:   Goal: No urinary complication  3/02/8359 3987 by Hilda Barker RN  Outcome: Ongoing  Note:   Voiding adequate amounts of clear yellow urine      Problem: Skin Integrity/Risk  Goal: No skin breakdown during hospitalization  4/20/2019 2255 by Hilda Barker RN  Outcome: Ongoing  Note:   Surgical incision to the right stump dressing in place no drainage noted      Problem: DISCHARGE BARRIERS  Goal: Patient's continuum of care needs are met  4/20/2019 2255 by Hilda Barker RN  Outcome: Ongoing  Note:   Planning rehab at discharge    Care plan reviewed with patient. Patient verbalize understanding of the plan of care and contribute to goal setting.

## 2019-04-22 ENCOUNTER — HOSPITAL ENCOUNTER (INPATIENT)
Age: 49
LOS: 9 days | Discharge: HOME | DRG: 560 | End: 2019-05-01
Attending: PHYSICAL MEDICINE & REHABILITATION | Admitting: PHYSICAL MEDICINE & REHABILITATION
Payer: MEDICARE

## 2019-04-22 VITALS
TEMPERATURE: 97.7 F | RESPIRATION RATE: 18 BRPM | DIASTOLIC BLOOD PRESSURE: 64 MMHG | BODY MASS INDEX: 35.68 KG/M2 | HEART RATE: 103 BPM | HEIGHT: 66 IN | SYSTOLIC BLOOD PRESSURE: 131 MMHG | WEIGHT: 222 LBS | OXYGEN SATURATION: 100 %

## 2019-04-22 DIAGNOSIS — S78.111A ABOVE KNEE AMPUTATION OF RIGHT LOWER EXTREMITY (HCC): Primary | ICD-10-CM

## 2019-04-22 DIAGNOSIS — E11.65 UNCONTROLLED TYPE 2 DIABETES MELLITUS WITH HYPERGLYCEMIA, WITH LONG-TERM CURRENT USE OF INSULIN (HCC): ICD-10-CM

## 2019-04-22 DIAGNOSIS — Z79.4 UNCONTROLLED TYPE 2 DIABETES MELLITUS WITH HYPERGLYCEMIA, WITH LONG-TERM CURRENT USE OF INSULIN (HCC): ICD-10-CM

## 2019-04-22 DIAGNOSIS — F31.81 BIPOLAR 2 DISORDER (HCC): ICD-10-CM

## 2019-04-22 PROBLEM — T87.43 RIGHT BKA INFECTION (HCC): Status: RESOLVED | Noted: 2019-02-15 | Resolved: 2019-04-22

## 2019-04-22 PROBLEM — E87.8 ELECTROLYTE IMBALANCE: Status: RESOLVED | Noted: 2019-02-15 | Resolved: 2019-04-22

## 2019-04-22 PROBLEM — T81.42XA DEEP POSTOPERATIVE WOUND INFECTION: Status: RESOLVED | Noted: 2019-04-02 | Resolved: 2019-04-22

## 2019-04-22 LAB
GLUCOSE BLD-MCNC: 131 MG/DL (ref 70–108)
GLUCOSE BLD-MCNC: 141 MG/DL (ref 70–108)
GLUCOSE BLD-MCNC: 143 MG/DL (ref 70–108)
GLUCOSE BLD-MCNC: 212 MG/DL (ref 70–108)

## 2019-04-22 PROCEDURE — 1180000000 HC REHAB R&B

## 2019-04-22 PROCEDURE — 82948 REAGENT STRIP/BLOOD GLUCOSE: CPT

## 2019-04-22 PROCEDURE — 6360000002 HC RX W HCPCS: Performed by: NURSE PRACTITIONER

## 2019-04-22 PROCEDURE — 6370000000 HC RX 637 (ALT 250 FOR IP): Performed by: PHYSICIAN ASSISTANT

## 2019-04-22 PROCEDURE — 2580000003 HC RX 258: Performed by: NURSE PRACTITIONER

## 2019-04-22 PROCEDURE — 6360000002 HC RX W HCPCS: Performed by: PHYSICIAN ASSISTANT

## 2019-04-22 PROCEDURE — 6370000000 HC RX 637 (ALT 250 FOR IP): Performed by: ORTHOPAEDIC SURGERY

## 2019-04-22 PROCEDURE — 97110 THERAPEUTIC EXERCISES: CPT

## 2019-04-22 PROCEDURE — 6370000000 HC RX 637 (ALT 250 FOR IP): Performed by: NURSE PRACTITIONER

## 2019-04-22 PROCEDURE — 97542 WHEELCHAIR MNGMENT TRAINING: CPT

## 2019-04-22 RX ORDER — OXYCODONE HYDROCHLORIDE AND ACETAMINOPHEN 5; 325 MG/1; MG/1
2 TABLET ORAL EVERY 4 HOURS PRN
Status: DISCONTINUED | OUTPATIENT
Start: 2019-04-22 | End: 2019-04-29

## 2019-04-22 RX ORDER — ONDANSETRON 2 MG/ML
4 INJECTION INTRAMUSCULAR; INTRAVENOUS EVERY 6 HOURS PRN
Status: DISCONTINUED | OUTPATIENT
Start: 2019-04-22 | End: 2019-04-30

## 2019-04-22 RX ORDER — DEXTROSE MONOHYDRATE 25 G/50ML
12.5 INJECTION, SOLUTION INTRAVENOUS PRN
Status: DISCONTINUED | OUTPATIENT
Start: 2019-04-22 | End: 2019-05-01 | Stop reason: HOSPADM

## 2019-04-22 RX ORDER — MIRTAZAPINE 15 MG/1
15 TABLET, FILM COATED ORAL NIGHTLY
Status: DISCONTINUED | OUTPATIENT
Start: 2019-04-22 | End: 2019-05-01 | Stop reason: HOSPADM

## 2019-04-22 RX ORDER — DOCUSATE SODIUM 100 MG/1
100 CAPSULE, LIQUID FILLED ORAL DAILY
Status: CANCELLED | OUTPATIENT
Start: 2019-04-23

## 2019-04-22 RX ORDER — INSULIN GLARGINE 100 [IU]/ML
55 INJECTION, SOLUTION SUBCUTANEOUS 2 TIMES DAILY
Status: DISCONTINUED | OUTPATIENT
Start: 2019-04-22 | End: 2019-04-30

## 2019-04-22 RX ORDER — NICOTINE POLACRILEX 4 MG
15 LOZENGE BUCCAL PRN
Status: DISCONTINUED | OUTPATIENT
Start: 2019-04-22 | End: 2019-05-01 | Stop reason: HOSPADM

## 2019-04-22 RX ORDER — DOCUSATE SODIUM 100 MG/1
100 CAPSULE, LIQUID FILLED ORAL DAILY
Status: DISCONTINUED | OUTPATIENT
Start: 2019-04-23 | End: 2019-04-22

## 2019-04-22 RX ORDER — OXYCODONE HYDROCHLORIDE AND ACETAMINOPHEN 5; 325 MG/1; MG/1
1 TABLET ORAL EVERY 4 HOURS PRN
Status: CANCELLED | OUTPATIENT
Start: 2019-04-22

## 2019-04-22 RX ORDER — OXYCODONE HYDROCHLORIDE AND ACETAMINOPHEN 5; 325 MG/1; MG/1
1 TABLET ORAL EVERY 4 HOURS PRN
Status: DISCONTINUED | OUTPATIENT
Start: 2019-04-22 | End: 2019-04-29

## 2019-04-22 RX ORDER — NICOTINE POLACRILEX 4 MG
15 LOZENGE BUCCAL PRN
Status: CANCELLED | OUTPATIENT
Start: 2019-04-22

## 2019-04-22 RX ORDER — DOCUSATE SODIUM 100 MG/1
100 CAPSULE, LIQUID FILLED ORAL 2 TIMES DAILY
Status: DISCONTINUED | OUTPATIENT
Start: 2019-04-22 | End: 2019-05-01 | Stop reason: HOSPADM

## 2019-04-22 RX ORDER — ACETAMINOPHEN 325 MG/1
650 TABLET ORAL EVERY 4 HOURS PRN
Status: CANCELLED | OUTPATIENT
Start: 2019-04-22

## 2019-04-22 RX ORDER — CYCLOBENZAPRINE HCL 10 MG
10 TABLET ORAL 3 TIMES DAILY PRN
Status: DISCONTINUED | OUTPATIENT
Start: 2019-04-22 | End: 2019-05-01 | Stop reason: HOSPADM

## 2019-04-22 RX ORDER — OXYCODONE HYDROCHLORIDE AND ACETAMINOPHEN 5; 325 MG/1; MG/1
2 TABLET ORAL EVERY 4 HOURS PRN
Status: CANCELLED | OUTPATIENT
Start: 2019-04-22

## 2019-04-22 RX ORDER — DEXTROSE MONOHYDRATE 50 MG/ML
100 INJECTION, SOLUTION INTRAVENOUS PRN
Status: CANCELLED | OUTPATIENT
Start: 2019-04-22

## 2019-04-22 RX ORDER — DEXTROSE MONOHYDRATE 50 MG/ML
100 INJECTION, SOLUTION INTRAVENOUS PRN
Status: DISCONTINUED | OUTPATIENT
Start: 2019-04-22 | End: 2019-05-01 | Stop reason: HOSPADM

## 2019-04-22 RX ORDER — ONDANSETRON 2 MG/ML
4 INJECTION INTRAMUSCULAR; INTRAVENOUS EVERY 6 HOURS PRN
Status: CANCELLED | OUTPATIENT
Start: 2019-04-22

## 2019-04-22 RX ORDER — DEXTROSE MONOHYDRATE 25 G/50ML
12.5 INJECTION, SOLUTION INTRAVENOUS PRN
Status: CANCELLED | OUTPATIENT
Start: 2019-04-22

## 2019-04-22 RX ORDER — ACETAMINOPHEN 325 MG/1
650 TABLET ORAL EVERY 4 HOURS PRN
Status: DISCONTINUED | OUTPATIENT
Start: 2019-04-22 | End: 2019-05-01 | Stop reason: HOSPADM

## 2019-04-22 RX ORDER — CYCLOBENZAPRINE HCL 10 MG
10 TABLET ORAL 3 TIMES DAILY PRN
Status: CANCELLED | OUTPATIENT
Start: 2019-04-22

## 2019-04-22 RX ORDER — SENNA PLUS 8.6 MG/1
2 TABLET ORAL NIGHTLY
Status: DISCONTINUED | OUTPATIENT
Start: 2019-04-22 | End: 2019-05-01 | Stop reason: HOSPADM

## 2019-04-22 RX ORDER — INSULIN GLARGINE 100 [IU]/ML
55 INJECTION, SOLUTION SUBCUTANEOUS 2 TIMES DAILY
Status: CANCELLED | OUTPATIENT
Start: 2019-04-22

## 2019-04-22 RX ORDER — MIRTAZAPINE 15 MG/1
15 TABLET, FILM COATED ORAL NIGHTLY
Status: CANCELLED | OUTPATIENT
Start: 2019-04-22

## 2019-04-22 RX ORDER — ONDANSETRON 4 MG/1
4 TABLET, FILM COATED ORAL EVERY 8 HOURS PRN
Status: DISCONTINUED | OUTPATIENT
Start: 2019-04-22 | End: 2019-05-01 | Stop reason: HOSPADM

## 2019-04-22 RX ADMIN — OXYCODONE HYDROCHLORIDE AND ACETAMINOPHEN 2 TABLET: 5; 325 TABLET ORAL at 16:10

## 2019-04-22 RX ADMIN — OXYCODONE HYDROCHLORIDE AND ACETAMINOPHEN 2 TABLET: 5; 325 TABLET ORAL at 06:33

## 2019-04-22 RX ADMIN — CYCLOBENZAPRINE HYDROCHLORIDE 10 MG: 10 TABLET, FILM COATED ORAL at 06:33

## 2019-04-22 RX ADMIN — INSULIN LISPRO 18 UNITS: 100 INJECTION, SOLUTION INTRAVENOUS; SUBCUTANEOUS at 16:23

## 2019-04-22 RX ADMIN — OXYCODONE AND ACETAMINOPHEN 2 TABLET: 5; 325 TABLET ORAL at 23:04

## 2019-04-22 RX ADMIN — MORPHINE SULFATE 2 MG: 2 INJECTION, SOLUTION INTRAMUSCULAR; INTRAVENOUS at 09:03

## 2019-04-22 RX ADMIN — INSULIN GLARGINE 55 UNITS: 100 INJECTION, SOLUTION SUBCUTANEOUS at 09:02

## 2019-04-22 RX ADMIN — MIRTAZAPINE 15 MG: 15 TABLET, FILM COATED ORAL at 23:04

## 2019-04-22 RX ADMIN — INSULIN LISPRO 18 UNITS: 100 INJECTION, SOLUTION INTRAVENOUS; SUBCUTANEOUS at 12:11

## 2019-04-22 RX ADMIN — SERTRALINE 150 MG: 100 TABLET, FILM COATED ORAL at 09:03

## 2019-04-22 RX ADMIN — CYCLOBENZAPRINE HYDROCHLORIDE 10 MG: 10 TABLET, FILM COATED ORAL at 23:06

## 2019-04-22 RX ADMIN — CEFTRIAXONE SODIUM 2 G: 2 INJECTION, POWDER, FOR SOLUTION INTRAMUSCULAR; INTRAVENOUS at 16:06

## 2019-04-22 RX ADMIN — INSULIN GLARGINE 55 UNITS: 100 INJECTION, SOLUTION SUBCUTANEOUS at 23:08

## 2019-04-22 RX ADMIN — DOCUSATE SODIUM 100 MG: 100 CAPSULE, LIQUID FILLED ORAL at 09:03

## 2019-04-22 ASSESSMENT — PAIN DESCRIPTION - ORIENTATION
ORIENTATION: RIGHT

## 2019-04-22 ASSESSMENT — PAIN SCALES - GENERAL
PAINLEVEL_OUTOF10: 6
PAINLEVEL_OUTOF10: 8
PAINLEVEL_OUTOF10: 8
PAINLEVEL_OUTOF10: 6
PAINLEVEL_OUTOF10: 8
PAINLEVEL_OUTOF10: 10
PAINLEVEL_OUTOF10: 8

## 2019-04-22 ASSESSMENT — PAIN DESCRIPTION - DESCRIPTORS
DESCRIPTORS: ACHING;THROBBING
DESCRIPTORS: ACHING
DESCRIPTORS: ACHING

## 2019-04-22 ASSESSMENT — PAIN DESCRIPTION - LOCATION
LOCATION: LEG

## 2019-04-22 ASSESSMENT — PAIN DESCRIPTION - PAIN TYPE
TYPE: SURGICAL PAIN

## 2019-04-22 ASSESSMENT — PAIN - FUNCTIONAL ASSESSMENT: PAIN_FUNCTIONAL_ASSESSMENT: ACTIVITIES ARE NOT PREVENTED

## 2019-04-22 ASSESSMENT — PAIN DESCRIPTION - PROGRESSION
CLINICAL_PROGRESSION: NOT CHANGED
CLINICAL_PROGRESSION: NOT CHANGED

## 2019-04-22 ASSESSMENT — PAIN DESCRIPTION - ONSET
ONSET: ON-GOING
ONSET: ON-GOING

## 2019-04-22 ASSESSMENT — PAIN DESCRIPTION - FREQUENCY
FREQUENCY: CONTINUOUS
FREQUENCY: INTERMITTENT
FREQUENCY: CONTINUOUS

## 2019-04-22 NOTE — PROGRESS NOTES
[facility]  Acute Inpatient Rehab Preadmission Assessment    Patient Name: Patti Phillips        MRN: [id number]    : 1970  (44 y.o.)  Gender: male     Admitted from:[address]  Initial Assessment    Date of admission to the hospital: 2019  7:40 AM  Date patient eligible for admission:2019    Primary Diagnosis: S/P Right AKA  Did patient have surgery? yes - Right AKA    Physicians: Marisela Goldman MD, Chun Maza    Risk for clinical complications/co-morbidities:   Past Medical History:   Diagnosis Date    Bipolar 2 disorder [facility])     previously followed with Dr. Shakila Hoffman and Kya Antonio in [facility] Diabetes mellitus type 2, uncontrolled ([address]     Diabetic foot ulcer ([address]     Diabetic polyneuropathy ([address]     Diabetic ulcer of right foot associated with type 2 diabetes mellitus ([address] 12/10/2015    Essential hypertension     \"never been on b/p medication that I know of\"    GERD (gastroesophageal reflux disease)     Hammer toe of left foot     Heart murmur     denies any chest pain or palpitations    History of tobacco abuse     Hx of BKA, right ([facility])     Macular edema, diabetic, bilateral ([address] 2018    Dr. Corky Arana referred to retina specialist for 2nd opinion    Marijuana abuse in remission     Onychomycosis     WD-Skin ulcer of fourth toe of right foot with necrosis of bone ([address] 2016       Financial Information  Primary insurance: Medicare    Secondary Insurance:  Medicaid    Has the patient had two or more falls in the past year or any fall with injury in the past year? yes    Did the patient have major surgery during the 100 days prior to admission?   yes    Precautions:   falls, infections and skin  Restrictions/Precautions: Fall Risk, Weight Bearing  Other position/activity restrictions: right AKA   Right Lower Extremity Weight Bearing: Non Weight Bearing    Isolation Precautions: None       Physiatrist:  Juan    Patients Occupation: Retired  Reviewed Lab and Diagnostic reports from Current Admission: Yes    Patients Prior Functional  Level: Prior Function  Lives With: Other (comment)(roommate)  Receives Help From: Home health  ADL Assistance: Independent  Homemaking Assistance: Independent  Ambulation Assistance: (Non ambulatory)  Transfer Assistance: Independent  Additional Comments: Pt reporting he was on TCU back in Feb 2019. Pt stating he was indep with ADL tasks at home. His wheelchair will fit through all his doorways at home. Current functional status for upper extremity ADLs  UE Dressing: Stand by assistance(sitting EOB donning overhead shirt)    Current functional status for lower extremity ADLs  LE Dressing:  Moderate assistance(assistance to thread clothing (shorts and depends)  over left leg )    Current functional status for bed, chair, wheelchair transfers  Contact guard assistance   Current functional status for toilet transfers   contact guard assistance     Current functional status for bladder management: Complete independence    Current functional status for bowel management:Complete independence    Current functional status for comprehension: Complete independence    Current functional status for expression: Complete independence    Current functional status for social interaction: Complete independence    Current functional status for problem solving: Complete independence    Current functional status for memory: Complete independence    Expected level of Improvement in Self-Care:  Complete independence    Expected level of Improvement in Sphincter Control:  Complete independence    Expected level of Improvement in Transfers: Complete independence    Expected level of Improvement in Locomotion:  Complete independence    Expected level of Improvement in Communication and Social Cognition: Complete independence    Expected length of time to achieve that level of improvement: 2 weeks    Current rehab issues: ADL dysfunction,bladder management,bowel management,carry over of therapy techniques, discharge planning, disease and co-morbidity management, gait/mobility dysfunction, medication management, nutrition and hydration management,Ongoing assessment of safety, Pain management, Patient and family education, Prevention of secondary complications, Skin Integrity,   Required therapy: Physical Therapy, Occupational Therapy3 hours per day, 5-6 days per week. Recreational Therapy 1 hour per week. Expected Discharge Destination: Home    Expected Post Discharge Treatments: Home Care    Other information relevant to the care needs:     Acute Inpatient Rehabilitation Disclosure Statement provided to patient. Patient verbalized understanding. I have reviewed and concur with the findings and results of the pre-admission screening assessment completed by the Inpatient Rehabilitation Admissions Coordinator.     Damon Arias MD

## 2019-04-22 NOTE — PLAN OF CARE
Problem: Falls - Risk of:  Goal: Will remain free from falls  Description  Will remain free from falls  4/22/2019 1114 by Christel De La Torre RN  Outcome: Met This Shift  Note:   No falls noted this shift. Patient transfers to his own wheelchair by himself. Bed kept in low position. Safe environment maintained. Bedside table & call light in reach. Uses call light appropriately when needing assistance. Problem: Cardiovascular  Goal: No DVT, peripheral vascular complications  3/57/6002 1114 by Christel De La Torre RN  Outcome: Met This Shift  Note:   Pt without s/s of DVT. Pt has scd  to help prevent development of DVT. Problem: GI  Goal: No bowel complications  5/09/3733 1114 by Christel De La Torre RN  Outcome: Met This Shift  Note:   Pt with bowel sounds,  passing flatus, and without nausea. Taking prescribed medications to assist with BM      Problem: Skin Integrity/Risk  Goal: No skin breakdown during hospitalization  4/22/2019 1114 by Christel De La Torre RN  Outcome: Met This Shift  Note:   No new skin breakdown this shift     Problem: DISCHARGE BARRIERS  Goal: Patient's continuum of care needs are met  4/22/2019 1114 by Christel De La Torre RN  Outcome: Met This Shift  Note:   Planning Rehab at discharge  Care plan reviewed with patient. Patient verbalize understanding of the plan of care and contribute to goal setting.

## 2019-04-22 NOTE — PROGRESS NOTES
AMPUTATION RIGHT LEG performed by Tish Perez MD at [address] Right 1/14/15    sole of foot I&D    MA DRAIN INFECT SHOULDER BURSA Left 8/18/2017    LEFT SHOULDER INCISION AND DRAINAGE performed by Tish Perez MD at [facility] OFFICE/OUTPT [address] Right 9/20/2018    EXCISIONAL DEBRIDEMENT RIGHT BKA STUMP performed by Clint Dean MD at [address] Right 1/16/15    2nd toe with wound vac applied    WISDOM TOOTH EXTRACTION  ? when       Restrictions/Precautions:  Fall Risk, Weight Bearing     Right Lower Extremity Weight Bearing: Non Weight Bearing              Other position/activity restrictions: right AKA        Prior Level of Function:  ADL Assistance: Independent  Homemaking Assistance: Independent  Ambulation Assistance: (Non ambulatory)  Transfer Assistance: Independent  Additional Comments: Pt reporting he was on TCU back in Feb 2019. Pt stating he was indep with ADL tasks at home. His wheelchair will fit through all his doorways at home. Subjective:     Subjective: Pt in wc at arrival. Agreeable to session, states he having a lot of stump pain. RN approved session. Pain:   .  Pain Assessment  Pain Level: 8       Social/Functional:  Lives With: Other (comment)(roommate)  Type of Home: House  Home Layout: Two level, Able to Live on Main level with bedroom/bathroom  Home Access: Ramped entrance  Home Equipment: Wheelchair-manual     Objective:       Transfers  Sit to Stand: Stand by assistance  Stand to sit: Stand by assistance             Propulsion 1  Propulsion: Manual  Level: Level Tile  Method: RUE;LUE  Level of Assistance: Modified independent  Description/ Details: slow pace, due to c/o Rt stump soreness.   Pt was mod I with positioning w/c and locking brakes in prep for transfer  Distance: [id number]          Exercises:  Exercises  Comments: pt completed LLE seated ex x15;heel/toe raises, glut set, marches, hip abd/add, and long arch quads all to increase LE strength for improved functional mobility, pt refusing to perform therex with RLE. Activity Tolerance:  Activity Tolerance: Patient limited by pain    Assessment: Body structures, Functions, Activity limitations: Decreased functional mobility   Assessment: Pt tolerated session well despite limted d/t pain. Pt remained in wc at end of session, did adjust height of walker. Pt would benefit from cont therapy. Prognosis: Good     REQUIRES PT FOLLOW UP: Yes    Discharge Recommendations:  Discharge Recommendations: Continue to assess pending progress, Patient would benefit from continued therapy after discharge    Patient Education:  Patient Education: POC    Equipment Recommendations:  Equipment Needed: Yes(Pt has manual w/c. May need walker)    Safety:  Type of devices: Gait belt, Nurse notified, Call light within reach, Left in chair    Plan:  Times per week: 6x- O  Specific instructions for Next Treatment: Rt LE ex in prep for prosthesis, transfers, bed mobility, w/c on ramp  Current Treatment Recommendations: Strengthening, Transfer Training, Wheelchair Mobility Training, Patient/Caregiver Education & Training, Functional Mobility Training, Safety Education & Training, Positioning    Goals:  Patient goals : go to [facility] or rehab    Short term goals  Time Frame for Short term goals: 1 wk  Short term goal 1: Pt to go supine <->sit, Mod I, to get in/out of bed. Short term goal 2: Pt to get up/down to stand +1 min assist to progress in standing tolerance  Short term goal 3: Pt to perform bed <->w/c. transfers, SBA, to get in/out of w/c. Short term goal 4: Pt to propel w/c up/down 25 ft ramp, SBA for home entry. Long term goals  Time Frame for Long term goals : NA due to expected short LOS  If patient is discharged prior to progress note completion, this note is to serve as the discharge note with all goals being unmet unless indicated otherwise.             [facility] Inpatient

## 2019-04-22 NOTE — PROGRESS NOTES
Spoke with Nik Beltran in regards to patient he feels patient is appropriate for rehab. Plan rehab today. Elizabeth Hancock and Jocelyne FIGUEREDO made aware. Patient will go to [phone].

## 2019-04-22 NOTE — PROGRESS NOTES
Orthopaedic Progress Note      SUBJECTIVE:  No chief complaint on file. POD #4 right AKA  Doing well up in chair  Pain well controlled        Physical    Vitals:    04/21/19 2346   BP: (!) 140/77   Pulse: 103   Resp: 16   Temp: 98.6 °F (37 °C)   SpO2: 99%         OBJECTIVE  Right AKA stump dry dressing intact, moves well.        Data  CBC:   Lab Results   Component Value Date    WBC 13.2 04/19/2019    RBC 3.85 04/19/2019    HGB 11.0 04/19/2019    HCT 31.9 04/19/2019    MCV 82.9 04/19/2019    MCH 28.6 04/19/2019    MCHC 34.5 04/19/2019    RDW 14.4 04/05/2018     04/19/2019    MPV 8.6 04/19/2019     BMP:    Lab Results   Component Value Date     04/19/2019    K 4.0 04/19/2019    K 4.2 04/03/2019    CL 97 04/19/2019    CO2 24 04/19/2019    BUN 10 04/19/2019    LABALBU 3.4 04/16/2019    CREATININE 0.6 04/19/2019    CALCIUM 8.6 04/19/2019    GFRAA >60 08/24/2016    LABGLOM >90 04/19/2019    GLUCOSE 229 04/19/2019     Uric Acid:  No components found for: URIC  PT/INR:    Lab Results   Component Value Date    PROTIME 12.2 01/03/2019    INR 1.06 01/03/2019     PTT:    Lab Results   Component Value Date    APTT 28.6 07/19/2016   [APTT  Troponin:  No results found for: TROPONINI  Urine Culture:  No components found for: CURINE    Current Inpatient Medications    Current Facility-Administered Medications: morphine (PF) injection 2 mg, 2 mg, Intravenous, Q2H PRN  oxyCODONE-acetaminophen (PERCOCET) 5-325 MG per tablet 1 tablet, 1 tablet, Oral, Q4H PRN  oxyCODONE-acetaminophen (PERCOCET) 5-325 MG per tablet 2 tablet, 2 tablet, Oral, Q4H PRN  glucose (GLUTOSE) 40 % oral gel 15 g, 15 g, Oral, PRN  dextrose 50 % solution 12.5 g, 12.5 g, Intravenous, PRN  glucagon (rDNA) injection 1 mg, 1 mg, Intramuscular, PRN  dextrose 5 % solution, 100 mL/hr, Intravenous, PRN  0.9 % sodium chloride infusion, , Intravenous, Continuous  cyclobenzaprine (FLEXERIL) tablet 10 mg, 10 mg, Oral, TID PRN  ondansetron (ZOFRAN) injection 4 mg, 4 mg, Intravenous, Q6H PRN  docusate sodium (COLACE) capsule 100 mg, 100 mg, Oral, Daily  magnesium hydroxide (MILK OF MAGNESIA) 400 MG/5ML suspension 30 mL, 30 mL, Oral, Daily PRN  acetaminophen (TYLENOL) tablet 650 mg, 650 mg, Oral, Q4H PRN  sertraline (ZOLOFT) tablet 150 mg, 150 mg, Oral, Daily  cefTRIAXone (ROCEPHIN) 2 g IVPB in D5W 50ml minibag, 2 g, Intravenous, Q24H  insulin glargine (LANTUS) injection vial 55 Units, 55 Units, Subcutaneous, BID  insulin lispro (HUMALOG) injection vial 18 Units, 18 Units, Subcutaneous, TID AC  mirtazapine (REMERON) tablet 15 mg, 15 mg, Oral, Nightly    . ASSESSMENT AND PLAN    Dry dressings prn  Continue abx per ID  PM & R consult for rehab, possibly transfer today if okay with all services.

## 2019-04-22 NOTE — PLAN OF CARE
Problem: Falls - Risk of:  Goal: Will remain free from falls  Description  Will remain free from falls  4/21/2019 2248 by Shalini Oneill RN  Outcome: Ongoing  Note:   Patient using call light appropriately to call for assistance. Patient is compliant with use of non-skid slippers. Patient reports understanding of fall prevention when discussed. Problem: Pain:  Goal: Pain level will decrease  Description  Pain level will decrease  4/21/2019 2248 by Shalini Oneill RN  Outcome: Ongoing  Note:   Patient reports pain at an 8 on scale. Patient states oral medication helping to achieve pain goal of a 4 on scale. Patient repositions self for comfort. Problem: Neurological  Goal: Maximum potential motor/sensory/cognitive function  4/21/2019 2248 by Shalini Oneill RN  Outcome: Ongoing  Note:   Patient is alert and oriented x4, numbness and tingling noted, and skin is warm and dry. Problem: Cardiovascular  Goal: No DVT, peripheral vascular complications  8/28/8049 2327 by Shalini Oneill RN  Outcome: Ongoing  Note:   Patient without s/s of DVT. Patient able to take prescribed anticoagulants to help prevent development of DVT. Problem: Respiratory  Goal: No pulmonary complications  4/08/1584 2327 by Shalini Oneill RN  Outcome: Ongoing  Note:   Patient 96% on room air. Patient denies any shortness of breath at this time. Lung sounds clear. Problem: GI  Goal: No bowel complications  1/60/2568 2327 by Shalini Oneill RN  Outcome: Ongoing  Note:   Patient with bowel sounds, passing flatus, and without nausea. Taking prescribed medications to assist with bowel movement. Problem:   Goal: No urinary complication  9/31/7061 6412 by Shalini Oneill RN  Outcome: Ongoing  Note:   Patient has adequate urinary output at this time. Denies any frequency or burning upon urination. Urine is yellow and clear.      Problem: Skin Integrity/Risk  Goal: No skin breakdown during hospitalization  4/21/2019 2327 by France Gonzales RN  Outcome: Ongoing  Note:   Patients dressing to right stump surgical incision is dry and intact. No other skin impairments noted. Patient understands and demonstrates the importance of frequent repositioning in order to prevent any skin breakdown. Problem: DISCHARGE BARRIERS  Goal: Patient's continuum of care needs are met  4/21/2019 2327 by France Gonzales RN  Outcome: Ongoing  Note:   Patient plans potentially rehab at discharge.  assisting with discharge needs. Care plan reviewed with patient. Patient verbalizes understanding of the plan of care and contributes to goal setting.

## 2019-04-22 NOTE — PROGRESS NOTES
sole of foot I&D    VA DRAIN INFECT SHOULDER BURSA Left 8/18/2017    LEFT SHOULDER INCISION AND DRAINAGE performed by Tessy Tate MD at [facility] OFFICE/OUTPT [address] Right 9/20/2018    EXCISIONAL DEBRIDEMENT RIGHT BKA STUMP performed by Gagandeep Epps MD at [address] Right 1/16/15    2nd toe with wound vac applied    WISDOM TOOTH EXTRACTION  ? when       Restrictions/Precautions:  Fall Risk, Weight Bearing     Right Lower Extremity Weight Bearing: Non Weight Bearing              Other position/activity restrictions: right AKA        Prior Level of Function:  ADL Assistance: Independent  Homemaking Assistance: Independent  Ambulation Assistance: (Non ambulatory)  Transfer Assistance: Independent  Additional Comments: Pt reporting he was on TCU back in Feb 2019. Pt stating he was indep with ADL tasks at home. His wheelchair will fit through all his doorways at home. Subjective       Subjective: Pt sitting up in wheelchair this date. Pt stating he was doing ok but still needs rehab to help take care of him. Pain:  Pain Assessment  Patient Currently in Pain: Yes  Pain Assessment: 0-10  Pain Level: 6  Pain Type: Surgical pain  Pain Location: (stump )  Pain Descriptors: Aching  Pain Frequency: Continuous       Objective                                                                      ADL  Grooming: Stand by assistance(seated at sink in wheelchair )  Additional Comments: Pt reporting he still needed assistance for dressing this date. Pt able to [facility] his wheelchair into bathroom for sinkside ADL asks in a seated position.  Pt delcining any further ADL tasks                                                                          Comment: Pt educated on use of blue theraband for bilateral UE strengthening HEP to icnerase endurance and strength for his ADL transfer such as to the BSC/toilet Activity Tolerance:  Activity Tolerance: Patient Tolerated treatment well  Activity Tolerance: session shortened this date d/t pt having lunch  in room upon therapist arrival.     Assessment:     Performance deficits / Impairments: Decreased ADL status, Decreased safe awareness, Decreased balance  Prognosis: Good    Discharge Recommendations:  Discharge Recommendations: IP Rehab    Patient Education:  Patient Education: UE strengthening with blue theraband   Barriers to Learning: decreased insight to safety awareness     Equipment Recommendations:  Equipment Needed: No    Safety:  Safety Devices in place: Yes  Type of devices: Call light within reach, Patient at risk for falls(left sitting in wheelchair eating lunch)    Plan:  Times per week: 5x  Current Treatment Recommendations: Patient/Caregiver Education & Training, Strengthening, Balance Training, Functional Mobility Training, Safety Education & Training, Self-Care / ADL    Goals:  Patient goals : get stronger to go home     Short term goals  Time Frame for Short term goals: 2 weeks   Short term goal 1: Pt to compelte LB dressing with min A   Short term goal 2: Pt to dmeo toilet transfer and tasks with min A   Short term goal 3: pt to complete pivot transfers from various surfaces with CGA and no vcs for safety to icnrease indepw ith ADL tasks   Long term goals  Time Frame for Long term goals : not est d/t ELOS   If patient is discharged prior to progress note completion, this note is to serve as the discharge note with all goals being unmet unless indicated otherwise.

## 2019-04-22 NOTE — CONSULTS
Physical Medicine and Rehabilitation Consult Note     Wendi Dickey  [id number]    Reason for Consult: evaluation for rehabilitation needs s/p RIGHT above knee amputation    Impression/Recommendations:     Impression:  1. S/p RIGHT above knee amputation for osteomyelitis of prior below knee amputation non healing wound by Dr. Galina Chamberlain on 4/18/2019.  2. Gait instability. 3. S/p incision and drainage with saucerization of RIGHT tibia and fibula with poorly healing wound and osteomyelitis with Streptococcus agalactiae by Dr. Galina Chamberlain on 4/4/2019.  4. S/p RIGHT below knee amputation for osteomyelitis of the RIGHT foot on 7/20/2016.  5. Typ 2 DM, poorly controlled. Last hemoglobin A1c was 9.1 on 4/2/2019. Range 8.1 to 12.2 from 2015 to 2019. 6. Hyponatremia. 7. Leukocytosis. 8. Diabetic polyneuropathy. 9. Hypertension. 10. Diabetic macular edema bilaterally. 11. Gastroesophageal reflux disease. 12. Bipolar 2 disorder. 13. Non compliance with medical treatment. 14. History of marijuana abuse. Recommendations:  Patient is a good candidate for IPR and is agreeable to do so. Continue PT/OT. Can transfer once medically stable. It was my pleasure to evaluate Wendi Dickey today. Please call with questions. Saumya Telles MD       History:   History of Present Illness:  Wendi Dickey is a 44 y.o. 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   He was admitted 4/18/2019 for for complaint of pain secondary to a wound in his prior below knee amputation. He originally had a RIGHT below-knee amputation for persistent osteomyelitis in his RIGHT foot on 7/20/2016 and subsequently developed a poorly healing wound and osteomyelitis with Streptococcus agalactiae. On 4/4/2019 he underwent incision and drainage with saucerization of the RIGHT tibia and fibula by Dr. Stephani Dash and a wound VAC was placed and he was discharged to home with home health with IV Rocephin. He had low-grade temperatures and leukocytosis noted at home by the home health nurse and he was referred to come back into the emergency department for evaluation. On 4/18/2019 he was agreeable to having a RIGHT above-knee amputation by Dr. Stephani Dash. The patient is a poorly controlled type II diabetic and has not had controlled hemoglobin A1c values at any time from 2015 2 2019. At time of initial consultation patient is resting comfortably in the hospital bed. He states that his current pain level is 8 out of 10 and that his current pain regimen is working for him. The surgical site is intact, with staple closure. He states that he is agreeable to coming to the acute inpatient rehabilitation unit to improve his function and ability for self-care. He states that his last bowel movement was on 4/20. Prior Function  Lives With: Other (comment)(roommate)  Receives Help From: Home health  ADL Assistance: Independent  Homemaking Assistance: Independent  Ambulation Assistance: (Non ambulatory)  Transfer Assistance: Independent  Additional Comments: Pt reporting he was on TCU back in Feb 2019. Pt stating he was indep with ADL tasks at home. His wheelchair will fit through all his doorways at home. Previously was independent of ADLs and used Wheelchair AD for mobility prior to recent admission. Initially has not ambulated with PT. With therapy is SBA for bed mobility, SBA/CGA for transfers, and SBA/CGA with balance.     ROM restrictions, WB restrictions, IADL History:  Lift/Transfer Equipment Utilized: None    Past Medical, Surgical, and Family History:    Medical:   Past Medical History:   Diagnosis Date    Bipolar 2 disorder ([address]     previously followed with Dr. Juan Borussard and Adam Bullard in [facility] Diabetes mellitus type 2, uncontrolled ([address]     HgbA1c on 4/2/2019 was 9.1.     Diabetic polyneuropathy ([address]     Diabetic ulcer of right foot associated with type 2 diabetes mellitus ([address] 12/10/2015    Essential hypertension     \"never been on b/p medication that I know of\"    GERD (gastroesophageal reflux disease)     Hammer toe of left foot     Heart murmur     denies any chest pain or palpitations    History of tobacco abuse     Hx of AKA (above knee amputation), right ([address] 04/18/2019    Dr. Falguni Thomas edema, diabetic, bilateral ([address] 05/04/2018    Dr. Cruz Negro referred to retina specialist for 2nd opinion    Marijuana abuse in remission     Onychomycosis     WD-Skin ulcer of fourth toe of right foot with necrosis of bone ([address] 6/29/2016     Surgical:   Past Surgical History:   Procedure Laterality Date    ABSCESS DRAINAGE Right     foot    AMPUTATION ABOVE KNEE Right 4/18/2019    RIGHT ABOVE KNEE AMPUTATION performed by Ekta Srivastava MD at [facility] Right 07/01/2016    I & D    INCISION AND DRAINAGE Right 2/18/2019    I&D RIGHT STUMP performed by Ekta Srivastava MD at [place] [address] Right 07/20/2016    LEG AMPUTATION BELOW KNEE Right 4/4/2019    I&D AND REVISION OF AMPUTATION RIGHT LEG performed by Ekta Srivastava MD at [address] Right 1/14/15    sole of foot I&D    NE DRAIN INFECT SHOULDER BURSA Left 8/18/2017    LEFT SHOULDER INCISION AND DRAINAGE performed by Ekta Srivastava MD at [address] [place] [address] OFFICE/OUTPT [address] [facility] Right 9/20/2018    EXCISIONAL DEBRIDEMENT RIGHT BKA STUMP performed by Krishna Coles MD at [address] [place] [address] AMPUTATION Right 1/16/15    2nd toe with wound vac applied    WISDOM TOOTH EXTRACTION  ? when     Family:   Family History   Problem Relation Age of Onset    Diabetes Mother     Other Mother         pneumonia, H1N1    Depression Mother     Early Death Mother     High Blood Pressure Mother     High Cholesterol Mother     Vision Loss Maternal Grandmother     Arthritis Maternal Grandfather     Heart Disease Maternal Grandfather        Social History:   reports that he quit smoking about 34 years ago. His smoking use included cigars. He has a 65.00 pack-year smoking history. He has never used smokeless tobacco. He reports that he drank alcohol. He reports that he does not use drugs. [facility] [place] [address]. Review of Systems     CONSTITUTIONAL:  negative  EYES:  positive for  visual disturbance  HEENT:  negative  RESPIRATORY:  negative  CARDIOVASCULAR:  negative  GASTROINTESTINAL:  positive for constipation  GENITOURINARY:  negative  SKIN:  negative  HEMATOLOGIC/LYMPHATIC:  negative  MUSCULOSKELETAL:  positive for  myalgias, arthralgias, pain and bone pain  NEUROLOGICAL:  positive for visual disturbance, pain and tingling  BEHAVIOR/PSYCH:  positive for Bipolar 1 disorder  10 point system review otherwise negative    Medications     Reviewed in EMR    PRNs:     Allergies: Allergies   Allergen Reactions    Pcn [Penicillins] Shortness Of Breath, Nausea And Vomiting and Other (See Comments)     Does not remember reactions. Has TOLERATED amoxicillin and several different cephalosporins.      Clindamycin/Lincomycin Itching       Physical Exam:     Physical Exam:  /64   Pulse 103   Temp 97.7 °F (36.5 °C) (Oral)   Resp 18   Ht 5' 6\" (1.676 m)   Wt 222 lb (100.7 kg) Comment: taken 2/2019  SpO2 100%   BMI 35.83 kg/m²     awake  Orientation:   person, place, time, situation  Mood: dysthymic  Affect: flat  General appearance: Patient is well nourished, well developed, well groomed and in no acute distress, overweight, appearing stated age, RIGHT AKA    Memory:   grossly normal  Attention/Concentration: normal  Language:  normal    Cranial Nerves:  cranial nerves II-XII are grossly intact  ROM:  normal NWB in RLL  Motor Exam:  Motor exam is 5 out of 5 all extremities with the exception of only RIGHT hip being testable    Tone:  normal  Muscle bulk: within normal limits  Sensory:  Touch:  Left Lower Extremity:  abnormal - over LEFT foot  Coordination:   normal  Deep Tendon Reflexes:  Right Bicep:  1+  Left Bicep:  1+  Left Knee:  1+    Skin: warm and dry, no rash or erythema and RIGHT thigh incision with staple closure C/D/I  Peripheral vascular: Pulses: Normal upper and lower extremity pulses with RIGHT LL not tetstable; Edema: 1+    Relevant Studies:     Diagnostics:  Recent Results (from the past 24 hour(s))   POCT glucose    Collection Time: 04/22/19  6:30 AM   Result Value Ref Range    POC Glucose 141 (H) 70 - 108 mg/dl   POCT glucose    Collection Time: 04/22/19 11:10 AM   Result Value Ref Range    POC Glucose 212 (H) 70 - 108 mg/dl   POCT glucose    Collection Time: 04/22/19  3:53 PM   Result Value Ref Range    POC Glucose 143 (H) 70 - 108 mg/dl     Lab Results   Component Value Date    LABA1C 9.1 (H) 04/02/2019     Lab Results   Component Value Date     06/30/2016     Recent Labs     04/19/19  0800 04/16/19  0314 04/15/19  1515   WBC 13.2* 13.1* 11.8*   HGB 11.0* 11.7* 12.2*   HCT 31.9* 33.9* 36.4*   MCV 82.9 83.5 84.7    418* 464*       Imaging  Xr Chest Standard (2 Vw)    Result Date: 4/16/2019  PROCEDURE: XR CHEST (2 VW) CLINICAL INFORMATION: Pain status post PICC placement. COMPARISON: Chest x-ray dated 2/15/2019 TECHNIQUE: PA and lateral views of the chest. FINDINGS: Lungs/pleura: No pneumonia, pulmonary edema, or obvious mass. No pleural effusion. No pneumothorax. Heart: Heart size is normal. Mediastinum/david: No obvious mass or adenopathy.  Skeleton: There is again deformity of the distal left clavicle. Lines/tubes/devices: There is a right upper extremity PICC line, tip in the region of the mid SVC in satisfactory position. No acute cardiopulmonary disease. Right upper extremity PICC line, tip in the region of the mid SVC in satisfactory position. **This report has been created using voice recognition software. It may contain minor errors which are inherent in voice recognition technology. ** Final report electronically signed by Dr. Mallika Dubon on 4/16/2019 4:21 AM    Xr Humerus Right (min 2 Views)    Result Date: 4/16/2019  PROCEDURE: XR HUMERUS RIGHT (MIN 2 VIEWS) CLINICAL INFORMATION: Pain status post PICC placement . COMPARISON: No prior study. TECHNIQUE: AP and lateral views of the right humerus. FINDINGS: Bones/joints: No fracture or dislocation. There is mild right AC joint DJD. Soft tissues: No significant soft tissue swelling. There is a small calcification at the posterior aspect of the humeral head adjacent to the greater tuberosity consistent with rotator cuff calcific tendinitis. Lines/tubes/devices: There is a right upper extremity PICC line which courses into the region of the right subclavian vein, distal portion not imaged. No acute bone or joint abnormality. Right upper extremity PICC line, distal portion not imaged. Rotator cuff calcific tendinitis. **This report has been created using voice recognition software. It may contain minor errors which are inherent in voice recognition technology. ** Final report electronically signed by Dr. Mallika Dubon on 4/16/2019 4:24 AM    Xr Femur Right (min 2 Views)    Result Date: 4/16/2019  PROCEDURE: XR FEMUR RIGHT (MIN 2 VIEWS) CLINICAL INFORMATION: Pain, amputation BKA, wound VAC, treated for osteomyelitis. . COMPARISON: Right femur series dated 9/18/2018 TECHNIQUE: AP and lateral views of the right femur  FINDINGS: Bones/joints: Patient is status post below-knee amputation. No fracture or dislocation.  No hip or knee joint space narrowing/DJD. The lateral cortex of the fibular head is indistinct suspicious for acute osteomyelitis. Soft tissues: There is air in the soft tissues of the BKA stump     Status post right below-knee amputation with air in the soft tissues of the stump. Findings suspicious for acute osteomyelitis of the lateral fibular head. **This report has been created using voice recognition software. It may contain minor errors which are inherent in voice recognition technology. ** Final report electronically signed by Dr. James Anton on 4/16/2019 4:19 AM    Ir Whiteside Dopp Guided Cva Device Plmt/replace/removal    Result Date: 4/9/2019  PICC LINE INSERTION WITH ULTRASOUND AND FLUOROSCOPIC GUIDANCE: CLINICAL INFORMATION: PICC line placement for IV ATB. IV team unsuccessful at bedside. PERFORMED BY: Kindra Golden MD APPROACH:  Right basilic Vein CATHETER: 5 Hebrew single lumen power PICC CATHETER LENGTH: 47 cm CATHETER TIP: Cavoatrial junction Fluoro Time: 0.4 Dose (mGy): 8 Fluoroscopy images: 1 PROCEDURE:  Signed informed consent was obtained prior to performing this procedure. The patient was not sedated during this procedure. Following local anesthesia and utilizing aseptic technique, the vein listed above was punctured with a micropuncture needle, utilizing ultrasound guidance, and a sonographic image was obtained for confirmation. A .018 wire was passed through this needle, followed by insertion of a 4 Western Rhina dilator. A peel-away sheath of the appropriate size was then advanced over the 018 wire. The PICC line was cut to the appropriate length and then advanced through the peel-away sheath, and the sheath was removed. This was all performed with fluoroscopic guidance. A spot film of the chest was obtained to document appropriate catheter position. The lumens of the catheter were flushed with saline, and positive pressure calves were applied.   The hub of the  catheter was then stabilized to the technology. ** Final report electronically signed by Dr. Xiomara Headley on 4/1/2019 2:36 PM    Mri Tibula Fibula Right Wo Contrast    Result Date: 4/3/2019  PROCEDURE: MRI TIBIA FIBULA RIGHT WO CONTRAST CLINICAL INFORMATION: INFECTION, open wound, erythema and swelling on the BKA stump COMPARISON: No prior study. TECHNIQUE: Coronal T1 and STIR bilateral lower legs, axial T1, FST2 and STIR lower leg and sagittal T1 and STIR lower leg. FINDINGS: ALIGNMENT: Anatomic. MARROW/MARROW EDEMA/FRACTURE: Moderate marrow edema within the residual fibula suspicious for osteomyelitis. No definite tibial marrow edema. OSSEOUS DESTRUCTION/PERIOSTITIS: Small area of cortical destruction within the fibular stump. KNEE JOINTS: Moderate joint effusion at the knee. TENDONS: Unremarkable. MUSCULATURE: Marked muscular edema involving the anterolateral muscle group of the lower leg. There is severe such change in the lateral gastrocnemius. NEUROVASCULAR: Unremarkable. SOFT TISSUES: There is a large soft tissue ulcer laterally near the stump of the fibula. Marked soft tissue inflammation is seen throughout this region. Myositis and deep cellulitis are favored. There are a few areas of susceptibility artifact suspicious  for gas-forming infection. This extends down to the fibular stump. It abuts the tibial stump laterally without definite tibial marrow edema. OTHER: None. Moderate osteomyelitis within the residual fibula adjacent to an ulcer and marked adjacent cellulitis and myositis. **This report has been created using voice recognition software. It may contain minor errors which are inherent in voice recognition technology. ** Final report electronically signed by Dr. Tammy Alberto on 4/3/2019 5:07 PM

## 2019-04-22 NOTE — CARE COORDINATION
Discharge Planning Update Note:   POD 4 right above the knee amputation by Dr [place] Face wants to go to [facility] IP Rehab. Vee, admissions coordinator consulted and is following. PT/OT. Physiatry Consult today. Continue IV antibiotics per Dr Mae Royal. and wound VAC. Medical management per hospitalist. Pain management measures. Await Dr Ade Stone evaluation and recommendation. 1500 Update: Vee said Dr Burris Come accepted to [facility] today. 4/22/19, 3:01 PM    Discharge plan discussed by  and . Discharge plan reviewed with patient/ family. Patient/ family verbalize understanding of discharge plan and are in agreement with plan. Understanding was demonstrated using the teach back method.    Services After Discharge  Services At/After Discharge: Nursing Services, PT, Skilled Therapy   IMM Letter  IMM Letter given to Patient/Family/Significant other/Guardian/POA/by[de-identified] cm  IMM Letter date given[de-identified] 04/22/19  IMM Letter time given[de-identified] 1500

## 2019-04-22 NOTE — PROGRESS NOTES
Progress note: Infectious diseases    Patient - Barbra Pabol,  Age - 44 y.o.    - 1970      Room Number - 5F-51/048-U   MRN -  [id number]   Acct # - [de-identified]  Date of Admission -  2019  7:40 AM    SUBJECTIVE:   No new complaints  OBJECTIVE   VITALS    height is 5' 6\" (1.676 m) and weight is 222 lb (100.7 kg). His oral temperature is 98.3 °F (36.8 °C). His blood pressure is 111/67 and his pulse is 116. His respiration is 18 and oxygen saturation is 95%. Wt Readings from Last 3 Encounters:   19 222 lb (100.7 kg)   19 222 lb (100.7 kg)   19 222 lb (100.7 kg)       I/O (24 Hours)    Intake/Output Summary (Last 24 hours) at 2019 1002  Last data filed at 2019 2107  Gross per 24 hour   Intake 600 ml   Output 2025 ml   Net -1425 ml       General Appearance  Awake, alert, oriented,  not  In acute distress  HEENT - normocephalic, atraumatic, pink conjunctiva,  anicteric sclera  Neck - Supple, no mass  Lungs -  Bilateral   air entry,   Cardiovascular - Heart sounds are normal.  Regular rate and rhythm without murmur, gallop or rub. Abdomen - soft, not distended, nontender,   Neurologic - oriented  Skin - No bruising or bleeding  Extremities - right AKA. Stump intact. staples in place.  bethadine applied    MEDICATIONS:      docusate sodium  100 mg Oral Daily    sertraline  150 mg Oral Daily    cefTRIAXone (ROCEPHIN) IV  2 g Intravenous Q24H    insulin glargine  55 Units Subcutaneous BID    insulin lispro  18 Units Subcutaneous TID AC    mirtazapine  15 mg Oral Nightly      dextrose      sodium chloride Stopped (19 0121)     morphine, oxyCODONE-acetaminophen, oxyCODONE-acetaminophen, glucose, dextrose, glucagon (rDNA), dextrose, cyclobenzaprine, ondansetron, magnesium hydroxide, acetaminophen      LABS:     CBC:   No results for input(s): WBC, HGB, PLT in the last 72 hours.  BMP:    No results for input(s): NA, K, CL, CO2, BUN, CREATININE, GLUCOSE in the last 72 hours. Calcium:  No results for input(s): CALCIUM in the last 72 hours. Problem list of patient:     Patient Active Problem List   Diagnosis Code    Status post below knee amputation of right lower extremity [facility] Z89.511    Gait disturbance R26.9    Sebaceous cyst L72.3    Uncontrolled type 2 diabetes mellitus with hyperglycemia, with long-term current use of insulin ([facility]) E11.65, Z79.4    Severe bipolar II disorder, recent episode major depressive, remission ([facility]) F31.81    Bipolar 1 disorder ([facility]) F31.9    Leukocytosis D72.829    Hyponatremia E87.1    Normocytic anemia D64.9    Obesity (BMI 30-39. 9) E66.9    Infection of below knee amputation stump ([facility]) T87.40    History of noncompliance with medical treatment Z91.19    Essential hypertension I10    Diabetic ulcer of right lower leg ([facility]) E11.622, L97.919    Hx of right BKA ([facility]) Z89.511    Tobacco dependence F17.200    History of osteomyelitis Z87.39    Gastroesophageal reflux disease without esophagitis K21.9    History of marijuana use Z87.898    Abscess of right leg L02.415    BKA stump complication ([facility]) J03.8    Physical debility R53.81    Diabetic ulcer of left fifth toe ([facility]) E11.621, L97.529    Cellulitis of fifth toe, left L03.032    Right BKA infection ([facility]) T87.43    Cellulitis of right lower extremity L03.115    Anemia D64.9    Electrolyte imbalance E87.8    DM type 2, uncontrolled, with lower extremity ulcer ([facility]) E11.622, E11.65, L97.909    Weakness R53.1    Deep postoperative wound infection T81.42XA    Infective myositis of right lower leg M60.061    Pyogenic inflammation of bone ([facility]) M86.9    Cellulitis and abscess of leg L03.119, L02.419    Infection of above knee amputation stump of right leg ([facility]) T87.43         ASSESSMENT/PLAN     Right above knee amputation due to Osteomyelites of stump(BKA)  Streptococcus bacteremia  Continue current treatment.       Joe Wilson MD, FACP 4/22/2019 10:02 AM

## 2019-04-23 LAB
ALBUMIN SERPL-MCNC: 3.1 G/DL (ref 3.5–5.1)
ALP BLD-CCNC: 72 U/L (ref 38–126)
ALT SERPL-CCNC: < 5 U/L (ref 11–66)
ANION GAP SERPL CALCULATED.3IONS-SCNC: 13 MEQ/L (ref 8–16)
AST SERPL-CCNC: 10 U/L (ref 5–40)
BILIRUB SERPL-MCNC: 0.2 MG/DL (ref 0.3–1.2)
BUN BLDV-MCNC: 12 MG/DL (ref 7–22)
CALCIUM SERPL-MCNC: 9 MG/DL (ref 8.5–10.5)
CHLORIDE BLD-SCNC: 102 MEQ/L (ref 98–111)
CO2: 24 MEQ/L (ref 23–33)
CREAT SERPL-MCNC: 0.6 MG/DL (ref 0.4–1.2)
ERYTHROCYTE [DISTWIDTH] IN BLOOD BY AUTOMATED COUNT: 14.8 % (ref 11.5–14.5)
ERYTHROCYTE [DISTWIDTH] IN BLOOD BY AUTOMATED COUNT: 44.9 FL (ref 35–45)
GFR SERPL CREATININE-BSD FRML MDRD: > 90 ML/MIN/1.73M2
GLUCOSE BLD-MCNC: 103 MG/DL (ref 70–108)
GLUCOSE BLD-MCNC: 121 MG/DL (ref 70–108)
GLUCOSE BLD-MCNC: 122 MG/DL (ref 70–108)
GLUCOSE BLD-MCNC: 204 MG/DL (ref 70–108)
GLUCOSE BLD-MCNC: 89 MG/DL (ref 70–108)
HCT VFR BLD CALC: 31.8 % (ref 42–52)
HEMOGLOBIN: 10.5 GM/DL (ref 14–18)
MCH RBC QN AUTO: 27.6 PG (ref 26–33)
MCHC RBC AUTO-ENTMCNC: 33 GM/DL (ref 32.2–35.5)
MCV RBC AUTO: 83.7 FL (ref 80–94)
PLATELET # BLD: 377 THOU/MM3 (ref 130–400)
PMV BLD AUTO: 8.6 FL (ref 9.4–12.4)
POTASSIUM SERPL-SCNC: 4 MEQ/L (ref 3.5–5.2)
RBC # BLD: 3.8 MILL/MM3 (ref 4.7–6.1)
SODIUM BLD-SCNC: 139 MEQ/L (ref 135–145)
TOTAL PROTEIN: 7.5 G/DL (ref 6.1–8)
VITAMIN D 25-HYDROXY: 9 NG/ML (ref 30–100)
WBC # BLD: 10.1 THOU/MM3 (ref 4.8–10.8)

## 2019-04-23 PROCEDURE — 6370000000 HC RX 637 (ALT 250 FOR IP): Performed by: ORTHOPAEDIC SURGERY

## 2019-04-23 PROCEDURE — 97110 THERAPEUTIC EXERCISES: CPT

## 2019-04-23 PROCEDURE — 2709999900 HC NON-CHARGEABLE SUPPLY

## 2019-04-23 PROCEDURE — 97530 THERAPEUTIC ACTIVITIES: CPT

## 2019-04-23 PROCEDURE — 85027 COMPLETE CBC AUTOMATED: CPT

## 2019-04-23 PROCEDURE — 6370000000 HC RX 637 (ALT 250 FOR IP): Performed by: PHYSICAL MEDICINE & REHABILITATION

## 2019-04-23 PROCEDURE — 1180000000 HC REHAB R&B

## 2019-04-23 PROCEDURE — 82948 REAGENT STRIP/BLOOD GLUCOSE: CPT

## 2019-04-23 PROCEDURE — 97165 OT EVAL LOW COMPLEX 30 MIN: CPT

## 2019-04-23 PROCEDURE — 2580000003 HC RX 258: Performed by: INTERNAL MEDICINE

## 2019-04-23 PROCEDURE — 82306 VITAMIN D 25 HYDROXY: CPT

## 2019-04-23 PROCEDURE — 36415 COLL VENOUS BLD VENIPUNCTURE: CPT

## 2019-04-23 PROCEDURE — 97535 SELF CARE MNGMENT TRAINING: CPT

## 2019-04-23 PROCEDURE — 97542 WHEELCHAIR MNGMENT TRAINING: CPT

## 2019-04-23 PROCEDURE — 97163 PT EVAL HIGH COMPLEX 45 MIN: CPT

## 2019-04-23 PROCEDURE — 80053 COMPREHEN METABOLIC PANEL: CPT

## 2019-04-23 PROCEDURE — 6360000002 HC RX W HCPCS: Performed by: INTERNAL MEDICINE

## 2019-04-23 RX ORDER — FAMOTIDINE 20 MG/1
20 TABLET, FILM COATED ORAL DAILY
Status: DISCONTINUED | OUTPATIENT
Start: 2019-04-24 | End: 2019-05-01 | Stop reason: HOSPADM

## 2019-04-23 RX ADMIN — DOCUSATE SODIUM 100 MG: 100 CAPSULE, LIQUID FILLED ORAL at 08:04

## 2019-04-23 RX ADMIN — CEFTRIAXONE SODIUM 2 G: 2 INJECTION, POWDER, FOR SOLUTION INTRAMUSCULAR; INTRAVENOUS at 17:20

## 2019-04-23 RX ADMIN — OXYCODONE AND ACETAMINOPHEN 2 TABLET: 5; 325 TABLET ORAL at 21:20

## 2019-04-23 RX ADMIN — SERTRALINE 150 MG: 100 TABLET, FILM COATED ORAL at 08:05

## 2019-04-23 RX ADMIN — OXYCODONE AND ACETAMINOPHEN 2 TABLET: 5; 325 TABLET ORAL at 14:20

## 2019-04-23 RX ADMIN — INSULIN LISPRO 18 UNITS: 100 INJECTION, SOLUTION INTRAVENOUS; SUBCUTANEOUS at 13:32

## 2019-04-23 RX ADMIN — CYCLOBENZAPRINE HYDROCHLORIDE 10 MG: 10 TABLET, FILM COATED ORAL at 08:03

## 2019-04-23 RX ADMIN — OXYCODONE AND ACETAMINOPHEN 2 TABLET: 5; 325 TABLET ORAL at 10:17

## 2019-04-23 RX ADMIN — INSULIN GLARGINE 55 UNITS: 100 INJECTION, SOLUTION SUBCUTANEOUS at 21:13

## 2019-04-23 RX ADMIN — VITAMIN D, TAB 1000IU (100/BT) 5000 UNITS: 25 TAB at 10:21

## 2019-04-23 RX ADMIN — VITAMIN D, TAB 1000IU (100/BT) 5000 UNITS: 25 TAB at 21:17

## 2019-04-23 RX ADMIN — SENNOSIDES 17.2 MG: 8.6 TABLET, FILM COATED ORAL at 21:17

## 2019-04-23 RX ADMIN — DOCUSATE SODIUM 100 MG: 100 CAPSULE, LIQUID FILLED ORAL at 21:17

## 2019-04-23 RX ADMIN — MIRTAZAPINE 15 MG: 15 TABLET, FILM COATED ORAL at 21:17

## 2019-04-23 RX ADMIN — OXYCODONE AND ACETAMINOPHEN 2 TABLET: 5; 325 TABLET ORAL at 05:07

## 2019-04-23 RX ADMIN — INSULIN GLARGINE 55 UNITS: 100 INJECTION, SOLUTION SUBCUTANEOUS at 08:06

## 2019-04-23 ASSESSMENT — PAIN DESCRIPTION - DESCRIPTORS
DESCRIPTORS: ACHING
DESCRIPTORS: ACHING;THROBBING

## 2019-04-23 ASSESSMENT — PAIN DESCRIPTION - ONSET: ONSET: ON-GOING

## 2019-04-23 ASSESSMENT — PAIN SCALES - GENERAL
PAINLEVEL_OUTOF10: 10
PAINLEVEL_OUTOF10: 7
PAINLEVEL_OUTOF10: 8
PAINLEVEL_OUTOF10: 5
PAINLEVEL_OUTOF10: 10
PAINLEVEL_OUTOF10: 4
PAINLEVEL_OUTOF10: 8
PAINLEVEL_OUTOF10: 9
PAINLEVEL_OUTOF10: 10
PAINLEVEL_OUTOF10: 0
PAINLEVEL_OUTOF10: 10

## 2019-04-23 ASSESSMENT — PAIN DESCRIPTION - PAIN TYPE
TYPE: SURGICAL PAIN

## 2019-04-23 ASSESSMENT — PAIN DESCRIPTION - LOCATION
LOCATION: LEG

## 2019-04-23 ASSESSMENT — PAIN DESCRIPTION - ORIENTATION
ORIENTATION: RIGHT

## 2019-04-23 ASSESSMENT — PAIN DESCRIPTION - PROGRESSION: CLINICAL_PROGRESSION: NOT CHANGED

## 2019-04-23 ASSESSMENT — PAIN DESCRIPTION - FREQUENCY: FREQUENCY: INTERMITTENT

## 2019-04-23 NOTE — PROGRESS NOTES
Nutrition Assessment    Type and Reason for Visit: Initial, Positive Nutrition Screen, Consult(New TCU Admit)    Nutrition Recommendations:   Trial Pascual BID  Please obtain actual weight  Honor food prefs as able    Nutrition Assessment:   Pt. nutritionally compromised AEB increased nutrition needs to promote wound healing. At risk for further nutrition compromise r/t uncontrolled DM, new AKA, psych hx, HTN, GERD. Will honor food prefs as able and trial Pascual BID to promote wound healing. Malnutrition Assessment:  · Malnutrition Status: At risk for malnutrition    Nutrition Risk Level: Moderate    Nutrient Needs:  · Estimated Daily Total Kcal: 3409-6008 bautista/d (17-20 bautista/kg UBW)(Using stated weight 101 kg)  · Estimated Daily Protein (g):  gm pro/d (1.7-2 gm pro/kg IBW)(54 kg)  · Estimated Daily Total Fluid (ml/day): Per Physician    Nutrition Diagnosis:   · Problem: Increased nutrient needs  · Etiology: related to Increased demand for energy/nutrients     Signs and symptoms:  as evidenced by Presence of wounds    Objective Information:  · Nutrition-Focused Physical Findings: Pt with new AKA (4/18/19). No BM reported.  (4/2/19) Hgb A1c 9.1  Declines DM educ today.   Meds include abx, colace, lantus, SSI, snokot, Vitamin D.   · Current Nutrition Therapies:  · Oral Diet Orders: Carb Control 4 Carbs/Meal   · Oral Diet intake: %  · Oral Nutrition Supplement (ONS) Orders: Wound Healing Oral Supplement(Trial Pascual BID)  · Anthropometric Measures:  · Ht: 5' 6\" (167.6 cm)   · Current Body Wt: 222 lb (100.7 kg)(2/2019 Stated weight)  · Admission Body Wt: 222 lb (100.7 kg)(stated weight)  · Usual Body Wt: 222 lb (100.7 kg)(Stated per patient)  · % Weight Change:  ,  Unsure, only stated weights available  · Ideal Body Wt: 119 lb (54 kg)(Adjusted for AKA), % Ideal Body 187%(Adjusted for AKA)  · BMI Classification: BMI > or equal to 40.0 Obese Class III(42.3 Adjusted for AKA)    Nutrition Interventions:

## 2019-04-23 NOTE — PROGRESS NOTES
[facility]    Time In: 1330  Time Out: 1400  Timed Code Treatment Minutes: 30 Minutes  Minutes: 30        Date: 2019  Patient Name: Jesus Solis,  Gender:  male        MRN: [id number]  : 1970  (44 y.o.)     Referring Practitioner: LIDIA Marvin MD  Diagnosis: above knee amputation of right lower extremity  Treatment Diagnosis: R AKA  Additional Pertinent Hx: HX:  BKA with wound over fibula. Underwent multiple debridements with no success. Pt admitted for AKA, performed 2019. To [facility] on      Past Medical History:   Diagnosis Date    Bipolar 2 disorder [facility])     previously followed with Dr. Vasyl Pradhan and Carrie Maza in [facility] Diabetes mellitus type 2, uncontrolled ([address]     HgbA1c on 2019 was 9.1.     Diabetic polyneuropathy ([address]     Diabetic ulcer of right foot associated with type 2 diabetes mellitus ([address] 12/10/2015    Essential hypertension     \"never been on b/p medication that I know of\"    GERD (gastroesophageal reflux disease)     Hammer toe of left foot     Heart murmur     denies any chest pain or palpitations    History of tobacco abuse     Hx of AKA (above knee amputation), right ([address] 2019    Dr. Saul Carlson edema, diabetic, bilateral ([address] 2018    Dr. Huy Harris referred to retina specialist for 2nd opinion    Marijuana abuse in remission     Onychomycosis     WD-Skin ulcer of fourth toe of right foot with necrosis of bone ([address] 2016     Past Surgical History:   Procedure Laterality Date    ABSCESS DRAINAGE Right     foot    AMPUTATION ABOVE KNEE Right 2019    RIGHT ABOVE KNEE AMPUTATION performed by Halle Hopper MD at [address] Right 2016    I & D    INCISION AND DRAINAGE Right 2019    I&D RIGHT STUMP performed by Halle Hopper MD at [facility] Right 07/20/2016    LEG AMPUTATION BELOW KNEE Right 4/4/2019    I&D AND REVISION OF AMPUTATION RIGHT LEG performed by Bushra Ann MD at [address] Right 1/14/15    sole of foot I&D    OR DRAIN INFECT SHOULDER BURSA Left 8/18/2017    LEFT SHOULDER INCISION AND DRAINAGE performed by Bushra Ann MD at [address] OFFICE/OUTPT [address] Right 9/20/2018    EXCISIONAL DEBRIDEMENT RIGHT BKA STUMP performed by Doyne Heimlich, MD at [address] [facility] Right 1/16/15    2nd toe with wound vac applied    WISDOM TOOTH EXTRACTION  ? when       Restrictions/Precautions:  Fall Risk, Weight Bearing, General Precautions     Right Lower Extremity Weight Bearing: Non Weight Bearing              Other position/activity restrictions: right AKA        Prior Level of Function:  ADL Assistance: Independent  Homemaking Assistance: Independent  Ambulation Assistance: Independent(wheelchair)  Transfer Assistance: Independent  Additional Comments: Pt reporting he was on TCU back in Feb 2019. Pt stating he was indep with ADL tasks at home. His wheelchair will fit through all his doorways at home. Subjective:  Chart Reviewed: Yes  Family / Caregiver Present: No  Subjective: pt in w/c on arrival, pleasant and agrees to therapy, reports incresaed soreness in stump    Pain:  Yes.   Pain Assessment  Pain Assessment: 0-10  Pain Level: 8  Pain Type: Surgical pain  Pain Location: Leg(stump)  Pain Orientation: Right  Pain Descriptors: Aching       Social/Functional:  Lives With: Other (comment)(Roommate)  Type of Home: House  Home Layout: Two level, Able to Live on Main level with bedroom/bathroom  Home Access: Ramped entrance  Home Equipment: Wheelchair-manual, Rolling walker(wheelchair not in good shape)     Objective:    Sit to Supine: Stand by assistance(mat)  Scooting: Stand by assistance    Transfers  Sit to Stand: Contact guard assistance  Stand to sit: Contact guard assistance  Stand Pivot Transfers: Contact guard assistance(with RW)              Balance  Sitting - Static: Good  Sitting - Dynamic: Good  Standing - Static: Fair  Standing - Dynamic: Fair  Comments: dynamic standing task grasping rings, performed with each UEs with increased difficulty reaching with R UE, CGA for safety. Fatigued in ~1 minute requesting to sit    Wheelchair Activities  Propulsion: Yes  Propulsion 1  Propulsion: Manual  Level: Level Tile  Method: RUE;LUE;LLE  Level of Assistance: Modified independent  Description/ Details: good pace and safety awareness, difficulty in tight spaces requiring cues to negotiate safely  Distance: 200ft         Exercises:  Exercises  Comments: supine heel slides, hip abd, straight leg raises, and quad sets x15 reps each L LE with 2# ankle weight to increase strength and improve mobility              Activity Tolerance:  Activity Tolerance: Patient limited by pain; Patient Tolerated treatment well    Assessment: Body structures, Functions, Activity limitations: Decreased functional mobility , Decreased ADL status, Decreased strength, Decreased endurance, Decreased balance  Assessment: Pt with improved safety awareness during transfers this date.  he continues to fatigue quickly especially at L LE requiring frequent rest breaks  Prognosis: Good          Discharge Recommendations:  Discharge Recommendations: Continue to assess pending progress    Patient Education:  Patient Education: POC, AKA, transfers, gait, safety awareness    Equipment Recommendations:  Equipment Needed: Yes(w/c, has RW)    Safety:  Type of devices: Gait belt, All fall risk precautions in place, Positioning belt, Patient at risk for falls(left on mat table with OT)    Plan:  Times per week: 5x/wk 90 min, 1x/wk 30 min  Current Treatment Recommendations: Strengthening, Transfer Training, Wheelchair Mobility Training, Patient/Caregiver Education & Training, Functional Mobility Training, Safety Education & Training, Positioning, Gait Training, Balance Training, Endurance Training, ADL/Self-care Training, Stair training, Home Exercise Program, Equipment Evaluation, Education, & procurement    Goals:  Patient goals : go home    Short term goals  Time Frame for Short term goals: 1 week  Short term goal 1: Pt to perform supine to/from sit at mod I to get in and out of bed  Short term goal 2: Pt to perform sit to/from stand at S with proper hand placement to rise from bed or chair  Short term goal 3: Pt to perform stand pivot with RW at SBA to get to and from bed and wheelchair  Short term goal 4: Pt to ambulate 30ft with RW at SBA to walk to and from restroom  Short term goal 5: Pt to propel wheelchair community distances and up and down 25 ft ramp at mod I for functional mobility    Long term goals  Time Frame for Long term goals : 2 weeks  Long term goal 1: Pt to perform sit to/from stand at mod I with proper hand placement to rise from bed or chair  Long term goal 2: Pt to perform stand pivot with RW at mod I to get to and from bed and wheelchair  Long term goal 3: Pt to ambulate 50ft with RW at mod I to walk to and from restroom  Long term goal 4: Pt to negotiate car transfer at S for transport to and from medical appts  If patient is discharged prior to progress note completion, this note is to serve as the discharge note with all goals being unmet unless indicated otherwise.

## 2019-04-23 NOTE — PROGRESS NOTES
[address] [address]  Recreational Therapy  Evaluation  Inpatient Rehabilitation Unit      Time Spent with Patient: 15 minutes    Date:  4/23/2019       Patient Name: Dena Ireland      MRN: [id number]       YOB: 1970 (44 y.o.)       Gender: male  Diagnosis: Above knee amputation of right lower extremity   Referring Practitioner: Dr. Kelly Garvin     RESTRICTIONS/PRECAUTIONS:  Restrictions/Precautions: Fall Risk, Weight Bearing  Vision: Within Functional Limits  Hearing: Within functional limits    PAIN: 8-R LE    SUBJECTIVE:  Pt lives at the [facility] with 15 other guys at this time and will be going back there at discharge-he has 2 sons that do not live close by and he is close to his [employer] from [employer]    VISION:  Within Normal Limit    HEARING: Within Normal Limit    LEISURE INTERESTS:   He likes to watch tv, lifts weights in his room, reads the paper and likes to play euchre-does not play euchre at home-does not initiate much conversation-states his pain is at an 8/10-nurse notified -enjoys being outside -pt is MI in w/c around unit     BARRIERS TO LEISURE INTERESTS:    Decreased endurance, Limited family support and Pain      Social Interaction: 6 - Patient requires medication for mood and/or effect    Patient Education  New Education Provided: Importance of Leisure, RT Plan of Care    Plan:  Continue to follow patient through this admission  Include patient in appropriate groups  See patient individually    Electronically signed by: GERMANIA Perry  Date: 4/23/2019

## 2019-04-23 NOTE — PLAN OF CARE
Individualized Plan of [facility] Inpatient Rehabilitation Unit    Rehabilitation physician: Vinny Bernardo MD     Admit Date: 4/22/2019     Rehabilitation Diagnosis: Above knee amputation of right lower extremity [facility]) [Z89.611]     Rehabilitation impairments: self care, mobility, bowel/bladder management, pain management and safety    Factors facilitating achievement of predicted outcomes: Friend support, Motivated, Cooperative, Sense of humor, Good insight into deficits, Has needed Durable Medical Equipment at home, Home is wheelchair accessible and Knowledge about rehab  Barriers to the achievement of predicted outcomes: Pain, Impulsivity, Depression, Wound Care and Medication managment    Patient Goals: Improve independence with mobility, Improvement of mobility at a wheelchair level, Increase overall strength and endurance, Increase balance, Increase independence with activities of daily living, Increase self-awareness, Increase safety awareness, Increase community integration, Increase socialization, Functional communication with caregivers, Integrate appropriate pain management plan, Assure adequate nutritional option for discharge, Continence of bowel and bladder and Provide appropriate patient and family education    NURSING:  Nursing goals for Barbra Pablo while on the rehabilitation unit will include:  Continence of bowel and bladder, Adequate number of bowel movements, Maintain O2 SATs at an acceptable level during stay, Effective pain management while on the rehabilitation unit, Establish adequate pain control plan for discharge, Absence of skin breakdown while on the rehabilitation unit, Improved skin integrity via assessments including wound measurements, Avoidance of any hospital acquired infections, No signs/symptoms of infection at the wound site, Freedom from injury during hospitalization and Complete education with patient/family with understanding demonstrated regarding disease process and resultant impairment     In order to achieve these goals, nursing interventions may include bowel/bladder training, education for medical assistive devices, medication education, O2 saturation management, energy conservation, stress management techniques, fall prevention, alarms protocol, seating and positioning, skin/wound care, pressure relief instruction, dressing changes, infection protection, DVT prophylaxis, assistance with safe transfers , and/or assistance with bathroom activities and hygiene.      PHYSICAL THERAPY:  Goals:        Short term goals  Time Frame for Short term goals: 1 week  Short term goal 1: Pt to perform supine to/from sit at mod I to get in and out of bed  Short term goal 2: Pt to perform sit to/from stand at S with proper hand placement to rise from bed or chair  Short term goal 3: Pt to perform stand pivot with RW at SBA to get to and from bed and wheelchair  Short term goal 4: Pt to ambulate 30ft with RW at SBA to walk to and from restroom  Short term goal 5: Pt to propel wheelchair community distances and up and down 25 ft ramp at mod I for functional mobility  Long term goals  Time Frame for Long term goals : 2 weeks  Long term goal 1: Pt to perform sit to/from stand at mod I with proper hand placement to rise from bed or chair  Long term goal 2: Pt to perform stand pivot with RW at mod I to get to and from bed and wheelchair  Long term goal 3: Pt to ambulate 50ft with RW at mod I to walk to and from restroom  Long term goal 4: Pt to negotiate car transfer at S for transport to and from medical appts    Plan of Care: Pt to be seen by physical therapy services 1 Hour 30 Minutes per day at least 5 out of 7 days per week for a total of 10 days during estimated length of stay    Anticipated interventions may include therapeutic exercises, gait training, neuromuscular re-ed, transfer training, community reintegration, bed mobility, w/c mobility and training. OCCUPATIONAL THERAPY:  Goals:             Short term goals  Time Frame for Short term goals: 1 week   Short term goal 1: Pt will tolerate standing 1 min with S while releasing 1 UE from walker to improve balance needed to manage clothing. Short term goal 2: Pt will complete sit pivot transfer using safe technique with SBA to improve safety when transferring to toilet. Short term goal 3: Pt will don shorts with Mod I to improve indep with getting dressed at home. Short term goal 4: Pt will preform IADL task from wheelchair level with mod I and no cues for safety to improve indep within home. Long term goals  Time Frame for Long term goals : 2 weeks   Long term goal 1: Pt will complete shower and dressing routine with mod I to return to prior level of independence. Long term goal 2: Pt will complete BUE HEP using handout with Mod I to improve UB strength to 5/5. Plan of Care: Patient to be seen by occupational therapy services 1 Hour 30 Minutes per day at least 5 out of 7 days per week for a total of 10 days during estimated length of stay    Anticipated interventions may include ADL and IADL retraining, strengthening, safety education and training, patient/caregiver education and training, equipment evaluation/ training/procurement, neuromuscular reeducation, wheelchair mobility training. SPEECH THERAPY:   Goals:  N/A    CASE MANAGEMENT:  Goals:   Assist patient/family with discharge planning, patient/family counseling,  and coordination with insurance during the inpatient rehabilitation stay. Other members of the multidisciplinary rehabilitation team that will be involved in the patient's plan of care include recreational therapy, dietary, respiratory therapy, and neuropsychology.     Medical issues being managed closely and that require 24 hour availability of a physician:  Bowel/Bladder function, Weight bearing precautions, Wound care, Pain management, Infection protection, DVT prophylaxis, Fall precautions, Fluid/Electrolyte balance, Nutritional status, Respiratory needs, Anemia and History of heart disease                                           Physician anticipated functional outcomes: Improved independence with functional measures   Estimated length of stay for this admission 12 days. Medical Prognosis: Fair  Anticipated disposition: Home with [facility]. The potential to achieve the above medical and rehabilitative goals is very good. This plan of care has been developed with the assistance and input of the multidisciplinary rehabilitation team.  The plan was reviewed with the patient. The patient has had the opportunity to provide input to the therapy team.    I have reviewed this Individualized Plan of Care and agree with its contents. Above documentation has been expanded, modified, adjusted to reflect the findings of my evaluations and goals for the patient.     Physician:  Jj Sharif MD

## 2019-04-23 NOTE — PROGRESS NOTES
[facility]  INPATIENT OCCUPATIONAL THERAPY  [address] [facility],[address]  DAILY NOTE    Time:  Time In: 1401  Time Out: 1431  Timed Code Treatment Minutes: 30 Minutes  Minutes: 30    Date: 2019  Patient Name: Amanda Huang,   Gender: male      Room: [facility]/068-A  MRN: [id number]  : 1970  (44 y.o.)  Referring Practitioner: Dr. Bethany Vann   Diagnosis: Above knee amputation of right lower extremity   Additional Pertinent Hx: 44 y.o. 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 He was admitted 2019 for for complaint of pain secondary to a wound in his prior below knee amputation. He originally had a RIGHT below-knee amputation for persistent osteomyelitis in his RIGHT foot on 2016 and subsequently developed a poorly healing wound and osteomyelitis with Streptococcus agalactiae. On 2019 he underwent incision and drainage with saucerization of the RIGHT tibia and fibula by Dr. Ron Ch and a wound VAC was placed and he was discharged to home with home health with IV Rocephin. He had low-grade temperatures and leukocytosis noted at home by the home health nurse and he was referred to come back into the emergency department for evaluation. On 2019 he was agreeable to having a RIGHT above-knee amputation by Dr. Ron Ch. The patient is a poorly controlled type II diabetic and has not had controlled hemoglobin A1c values at any time from  2 .     Past Medical Bearing, General Precautions     Right Lower Extremity Weight Bearing: Non Weight Bearing     Other position/activity restrictions: right AKA     Prior Level of Function:  ADL Assistance: Independent  Homemaking Assistance: Independent  Ambulation Assistance: Independent(wheelchair)  Transfer Assistance: Independent  Additional Comments: Pt reporting he was on TCU back in Feb 2019. Pt stating he was indep with ADL tasks at home. His wheelchair will fit through all his doorways at home. Subjective  Subjective: Patient cooperative. Flat affect, appears disengaged. Responds in 'yes/no', not talkative. Pain:  Pain Assessment  Patient Currently in Pain: Yes  Pain Level: 10  Pain Type: Surgical pain  Pain Location: Leg  Pain Orientation: Right       Objective  Cognition Comment: Appropriate for conversation, demo's some decreased insight, judgement and safety awareness. Transfers  Sit Pivot Transfers: Contact guard assistance  Sit to stand: Contact guard assistance  Stand to sit: Contact guard assistance     Balance  Sitting Balance: Supervision  Standing Balance: Contact guard assistance     Functional Mobility  Functional - Mobility Device: Wheelchair  Assist Level: Modified independent   Functional Mobility Comments: From gym to room. Requested A to go to TCU due to decreased endurance. Would benefit from endurance building. Edu on w/c mobility HEP in evenings to assist with endurance. Exercises   Comment: Patient completed UE strengthening with a max resistive band x15 reps x1 set with mod rest breaks for bilateral UE strengthening HEP to icnerase endurance and strength for his ADL transfer such as to the BSC/toilet. Activity Tolerance:  Activity Tolerance: Patient limited by fatigue  Activity Tolerance: Intermittent rest breaks needed during treatment due to fatigue. Pt reports he wouldnt usually get this tired during activity.       Assessment:  Assessment: Pt s/p AKA with decreased ability tocomplete ADL tasks and mobility at [facility]. Pt demo decreased safety awareness during session as well. tp would benefit from skilled OTs ervices to increase his indep wiht ADL atsks and to educate on safety awarenss during ADL tasks and transfers. Performance deficits / Impairments: Decreased ADL status, Decreased safe awareness, Decreased balance, Decreased endurance, Decreased strength  Prognosis: Good    Discharge Recommendations:  Discharge Recommendations: Home with assist PRN    Patient Education:  Patient Education: HEP     Equipment Recommendations: Other: Pt has a tub transfer bench. Uses a standard toilet, but denies needs. Recommend grab bar near toilet instead of using towel bar. Safety:  Safety Devices in place: Yes  Type of devices: All fall risk precautions in place, Gait belt, Call light within reach, Left in chair    Plan:  Times per week: 5x/wk for 90 min and 1x/wk for 30 min    Current Treatment Recommendations: Patient/Caregiver Education & Training, Strengthening, Balance Training, Functional Mobility Training, Safety Education & Training, Self-Care / ADL, Endurance Training    Goals:  Patient goals : Improve my strength and become steadier     Short term goals  Time Frame for Short term goals: 1 week   Short term goal 1: Pt will tolerate standing 1 min while releasing 1 UE from walker to improve balance needed to manage clothing. Short term goal 2: Pt will complete sit pivot transfer using safe technique with SBA to improve safety when transferring to toilet. Short term goal 3: Pt will complete LB dressing tasks with supervision to improve indep with self care at home. Short term goal 4: Pt will preform IADL task from wheelchair level with mod I and no cues for safety to improve indep within home. Long term goals  Time Frame for Long term goals : 2 weeks   Long term goal 1: Pt will complete shower and dressing routine with mod I to return to prior level of independence.    Long

## 2019-04-23 NOTE — PROGRESS NOTES
[facility]  Individualized Disclosure Statement      Patient: Jericho Hadley      Scope of Service  [facility] provides 24 hour individualized service to patients with functional limitations due to, but not limited to: stroke, brain injury, spinal cord injury, major multiple trauma, fractures, amputation, and neurological disorders. The [address] [place] [address] provides rehabilitative nursing and medical services as well as physical, occupational, speech, and recreation therapies. [address] [facility] is fully accredited by the Commission on Accreditation of Rehabilitation Facilities (CARF) as a comprehensive provider of rehabilitation services. Patients admitted to the [address] [place] [address] receive a minimum of three hours of therapy per day, at least six days per week, with a revised therapy schedule on weekends and holidays. Physical therapy, occupational therapy, and speech therapy are provided seven days per week including holidays. Other therapeutic services are available on weekends and evenings as needed or scheduled. Intensity of Treatment  Your treatment program will consist of Nursing Care and:  1.5 hours of Physical Therapy, per day  1.5    hours of Occupational Therapy, per day  1 hours of Recreational Therapy, per week    [address] [address] maintains contracts with most insurance plans. Depending on the type of coverage, the insurance may impose limits on the coverage for rehabilitation care. Coverage is based on the premise that you are able to fully participate in the rehabilitation program and show continued progress. Please verify your own insurance information A copy of this was given to the patient/ family on this date.   Insurance Coverage  Your insurance company has made the following determination relative to the length of your stay:   Your estimated length of stay is 14 days   Your insurance Coverage has been verified as follows:    Primary Insurance:medicare    Deductible:1364 Coverage:active   Secondary Insurance:medicaid ;secondary insurance policies often cover co-pay amounts, but to ensure payment please contact your insurance company.     Alternative Resources: Please ask the  for more information [phone]

## 2019-04-23 NOTE — PLAN OF CARE
Problem: Pain:  Goal: Pain level will decrease  Description  Pain level will decrease  Outcome: Ongoing    Patient will have a decrease in pain during this admission as evidenced by patient report related to pain   Goal: Control of acute pain  Description  Control of acute pain  Outcome: Ongoing    Patient will have a decrease in pain during this admission as evidenced by patient report related to pain   Goal: Control of chronic pain  Description  Control of chronic pain  Outcome: Ongoing     Patient will have a decrease in pain during this admission as evidenced by patient report related to pain     Problem: Falls - Risk of:  Goal: Will remain free from falls  Description  Will remain free from falls  Outcome: Ongoing    Patient will remain free from falls during this admission as evidenced by no falls during admission, patient and staff will use safety devices and techniques for transfers   Goal: Absence of physical injury  Description  Absence of physical injury  Outcome: Ongoing    Patient will remain free from physical injury during this admission as evidenced by no physical injury during admission, patient and staff will use safety devices and techniques for transfers      Problem: Risk for Impaired Skin Integrity  Goal: Tissue integrity - skin and mucous membranes  Description  Structural intactness and normal physiological function of skin and  mucous membranes.   Outcome: Ongoing     Patient will remain free from skin breakdown during this admission     Problem: Infection - Surgical Site:  Goal: Will show no infection signs and symptoms  Description  Will show no infection signs and symptoms  Outcome: Ongoing     Problem: Mood - Altered:  Goal: Mood stable  Description  Mood stable  Outcome: Ongoing

## 2019-04-23 NOTE — DISCHARGE SUMMARY
with no LOB. Pt. limited by pain and ill-feeling this session and declines further ambulation. Distance: 30' x 1  Comments: Pt. limited by pain this session, unable to complete longer distance, Stairs  # Steps : 1  Rails: Bilateral(// bars)  Curbs: 6\"  Assistance: Contact guard assistance  Comment: Pt. ascended/descended 6\" platform step in // bars. Pt. hesitant to complete. Slightly unsteady but no LOB. Mobility: Bed, Chair, Wheel Chair: 6 - Requires assistive device (slide rail)  Walk: 2 - Maximal Assistance Requires up to Maximal Assistance AND requires assistance of one person to walk between  feet (Patient performs 25-49% of locomotion effort or goes between  feet)  Distance Walked: 50'  Wheel Chair: 6 - [address] Operates wheelchair at least 150 feet with an ambulatory device, orthosis or prosthesis OR requires extra amount of time OR there is concern for safety  Distance Traveled in Wheel Chair: 500'  Stairs: 1- Total Assistance perfoms less than 25% of the effort, or requirs the assistance of two people, or goes up and down fewer than 4 stairs, PT Equipment Recommendations  Equipment Needed: Yes(w/c, has RW), Assessment: Pt. tolerated session fairly well. Pt. pleasant and cooperative but limited by upset stomach and pain. Pt. not current on pain meds during session. Pt. unable to tolerate further ambulation distance this session.      Occupational therapy: Eatin - Patient feeds self  Groomin - Patient independent with all grooming tasks(washing face and hands, using baby powder, underarm deoderant )  Bathin - Able to bathe all 10 areas with setup/sup/cues  Dressing-Upper: 7 - Patient independently dresses upper body(donning and doffing pullover shirt)  Dressing-Lower: 3 - Requires assist with 2-3 parts of dressing(Donning Sock, pulling pants up over hips and Shoe)  Toiletin - Requires device (grab bar/walker/etc.)(per RN staff)  Toilet Transfer: 6 - Independent with wound VAC was placed and he was discharged to home with home health with IV Rocephin.  He had low-grade temperatures and leukocytosis noted at home by the home health nurse and he was referred to come back into the emergency department for evaluation.  On 4/18/2019 he was agreeable to having a RIGHT above-knee amputation by Dr. Charlyn Fothergill.  The patient is a poorly controlled type II diabetic and has not had controlled hemoglobin A1c values at any time from 2015 to 2019.     At time of initial consultation patient is resting comfortably in the hospital bed. Bj rTaylor states that his current pain level is 8 out of 10 and that his current pain regimen is working for him. Chinmay Recinos surgical site is intact, with staple closure.  He states that he is agreeable to coming to the acute inpatient rehabilitation unit to improve his function and ability for self-care. Bj Traylor states that his last bowel movement was on 4/20.     In the interim his pain is controlled. Appetite is good. He is tolerating his offered therapies. Had a bowel movement today. Low vitamin D level discussed and he is agreeable to supplementation. The patient was discharged with stable vital signs with exception of mild asymptomatic hypertension; pain was well controlled at time of discharge. Medical course on the inpatient rehabilitation unit was notable for hyperglycemia secondary to poorly controlled diabetes, postoperative anemia, less than optimal pain control, lower limb edema, and a severely low vitamin D level. For the hypoglycemia endocrinology was consulted to help with adjustment of the patient's insulin regimen as he had freqeant episodes of hypoglycemia on his home insulin regimen. Postoperative anemia was mildly improved without use of supplemental oral iron from 10.5 to 10.9.   To improve pain control several items were addressed, including slightly increasing the Percocet dosing, addressing the lower limb edema of the residual limb which appeared to be contributing to his pain over the superior portion of the surgical incision, and lastly contacting the orthopedic surgery service to obtain timing on when Ace wrapping could be initiated for the residual limb; at least 3 items his pain was improved by time of discharge. In regards to the lower limb edema he was placed on a daily dose of 20 mg of Lasix and 20 mEq of potassium with very good results in reducing the dependent edema both in the residual limb and the intact left leg by time of discharge. For the severely low vitamin D level of 9 cholecalciferol was started at a dosing of 5000 international units twice a day with improvement to a level of 16 over just 6 days. He was discharged home with a prescription to continue the 5000 international unit dosing for one month. Details are provided below. The patient progressed well with the offered therapies and was discharged to home with a Physical Therapy and Occupational Therapy provided HEP. Issues addressed during rehabilitation stay and medication changes:   Narcotic usage:  Percocet Last 24 hour usage 30mg. Increased. Last BM:  Stool Amount: Medium (04/28/19 1939)     Acute/Rehabilitation Problems:  1. S/p RIGHT above knee amputation for osteomyelitis of prior below knee amputation non healing wound by Dr. Hanna on 4/18/2019.  1. Pain Control. 1. Percocet. Dosing increased to 7.5/15mg on 4/29.  2. Tylenol. 3. Flexeril. 2. Wound care. 1. Call in to Dr. Theresa Peraza to ask when/if limb can be wrapped. 2. Service saw patient 4/26.  3. Will add morphine for severe pain for 2 days only and then discontinue. 4. Dry dressing changes as needed. 5. Stump  or compression dressing to RIGHT stump for molding and swelling. Ace wraps place 4/29. 3. Antibiotics. 1. Ceftriaxone q24H.  End 4/25.  4. Have Angelia Vila ([phone]) from [facility] to see patient. Saw patient on 4/24.   Recommended to determine if limb can be wrapped to blood pressure greater than 960 or diastolic blood pressure greater than 90. Hyperlipidemia was considered and the patient was started or continued on a statin medication for any LDL>100. Appropriate DVT prophylaxis options were considered throughout rehabilitation stay. DME:  Thanh Cruz was evaluated today and a DME order was entered for a lightweight wheelchair because he requires this to successfully complete daily living tasks of bathing, toileting, personal cares, grooming and hygiene. A lightweight wheelchair is necessary due to the patient's impaired ambulation and mobility restrictions. The patient would not be able to resolve these daily living tasks using a cane or walker. The patient can self-propel a lightweight wheelchair safely in their home and can maneuver within their home with adequate access. The patient cannot self-propel in a standard wheelchair. The need for this equipment was discussed with the patient and he understands, is in agreement, and has not expressed an unwillingness to use the wheelchair. Thanh Cruz can self propel a wheelchair and needs anti-rollback device because of ramps. The patient requires a standard cushion for his wheelchair due to prolonged time in the wheelchair during the day to prevent skin breakdown.     Consults:   Orthopedic Surgery, Infectious Disease, Endocrinology, Physical Medicine    Significant Diagnostics:     Lab Results   Component Value Date     04/29/2019    K 4.6 04/29/2019    CL 99 04/29/2019    CO2 26 04/29/2019    BUN 20 04/29/2019    CREATININE 0.7 04/29/2019    GLUCOSE 209 (H) 04/29/2019    CALCIUM 9.5 04/29/2019    PROT 7.7 04/29/2019    LABALBU 3.8 04/29/2019    BILITOT 0.2 (L) 04/29/2019    ALKPHOS 72 04/29/2019    AST 9 04/29/2019    ALT <5 (L) 04/29/2019    LABGLOM >90 04/29/2019    GFRAA >60 08/24/2016       Recent Labs     04/29/19  0430 04/23/19  0457 04/19/19  0800   WBC 11.0* 10.1 13.2*   HGB 10.9* 10.5* 11.0* HCT 33.8* 31.8* 31.9*   MCV 84.7 83.7 82.9    377 370       Recent Labs     04/30/19  2203 05/01/19  0236 05/01/19  0754   POCGLU 73 125* 179*     Results for Bee Lord (MRN [id number]) as of 5/5/2019 21:33   Ref.  Range 3/24/2018 15:45 4/4/2018 00:27 8/23/2018 17:44 9/18/2018 17:41 10/22/2018 17:54 4/2/2019 16:26   Hemoglobin A1C Latest Ref Range: 4.4 - 6.4 % 10.7 (H) 10.3 (H) 12.2 (H) 11.0 (H) 9.8 (H) 9.1 (H)     Patient Instructions:    See AVS  Follow-up visits: See after visit summary from hospitalization    Discharge Medications:   Kacey Napier [address] [place] [address] Medication Instructions [id number]    Printed on:05/01/19 1026   Medication Information                      acetaminophen (TYLENOL) 500 MG tablet  Take 1,000 mg by mouth every 6 hours as needed for Pain             blood glucose monitor kit and supplies  Use to test blood sugars DX:E11.65             blood glucose monitor strips  Accucheck Sheila Test Strips Test blood sugars 4 times daily DX:E11.65             Continuous Blood Gluc  (FREESTYLE MARIANELA READER) LIANNE  1 Units by Does not apply route continuous             cyclobenzaprine (FLEXERIL) 10 MG tablet  Take 1 tablet by mouth 3 times daily as needed for Muscle spasms             insulin glargine (BASAGLAR KWIKPEN) 100 UNIT/ML injection pen  Inject 48 Units into the skin every morning             insulin lispro (HUMALOG) 100 UNIT/ML injection vial  Inject 5-20 Units into the skin 3 times daily (before meals) One unit for 10 grams of carbs plus low dose sliding scale TID AC             insulin lispro (HUMALOG) 100 UNIT/ML injection vial  Inject 0-6 Units into the skin 3 times daily (with meals)             insulin lispro (HUMALOG) 100 UNIT/ML injection vial  Inject 0-3 Units into the skin nightly             Insulin Syringe-Needle U-100 29G X 1/2\" 0.3 ML MISC  1 each by Does not apply route 4 times daily (after meals and at bedtime)             Lancets MISC  Use to test blood

## 2019-04-23 NOTE — PROGRESS NOTES
[address]  EVALUATION    Time:  Time In: 0830  Time Out: 0930  Timed Code Treatment Minutes: 39 Minutes  Minutes: 60          Date: 2019  Patient Name: Missy Rahman,   Gender: male      MRN: [id number]  : 1970  (44 y.o.)  Referring Practitioner: Dr. Aleta Jewell   Diagnosis: Above knee amputation of right lower extremity   Additional Pertinent Hx: 44 y.o. 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 He was admitted 2019 for for complaint of pain secondary to a wound in his prior below knee amputation. He originally had a RIGHT below-knee amputation for persistent osteomyelitis in his RIGHT foot on 2016 and subsequently developed a poorly healing wound and osteomyelitis with Streptococcus agalactiae. On 2019 he underwent incision and drainage with saucerization of the RIGHT tibia and fibula by Dr. Adrianna Mena and a wound VAC was placed and he was discharged to home with home health with IV Rocephin. He had low-grade temperatures and leukocytosis noted at home by the home health nurse and he was referred to come back into the emergency department for evaluation. On 2019 he was agreeable to having a RIGHT above-knee amputation by Dr. Adrianna Mena.   The patient is a poorly controlled type II diabetic and has not had controlled hemoglobin A1c values at any time from  2 2019.    Restrictions/Precautions:  Fall Risk, Weight Bearing     Right Lower Extremity Weight Bearing: Non Weight Bearing              Other position/activity restrictions: right AKA        Past Medical History:   Diagnosis Date    Bipolar 2 disorder ([facility]     previously followed with Dr. Sylvie Mccann and Burton Martinez in [facility] Diabetes mellitus type 2, uncontrolled ([address]     HgbA1c on 4/2/2019 was 9.1.     Diabetic polyneuropathy ([address]     Diabetic ulcer of right foot associated with type 2 diabetes mellitus ([address] 12/10/2015    Essential hypertension     \"never been on b/p medication that I know of\"    GERD (gastroesophageal reflux disease)     Hammer toe of left foot     Heart murmur     denies any chest pain or palpitations    History of tobacco abuse     Hx of AKA (above knee amputation), right ([address] 04/18/2019    Dr. Onofre Held edema, diabetic, bilateral ([facility] 05/04/2018    Dr. 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 ([facility] 6/29/2016     Past Surgical History:   Procedure Laterality Date    ABSCESS DRAINAGE Right     foot    AMPUTATION ABOVE KNEE Right 4/18/2019    RIGHT ABOVE KNEE AMPUTATION performed by Jonatan Corona MD at [address] Right 07/01/2016    I & D    INCISION AND DRAINAGE Right 2/18/2019    I&D RIGHT STUMP performed by Jonatan Corona MD at [address] [facility] Right 07/20/2016    LEG AMPUTATION BELOW KNEE Right 4/4/2019    I&D AND REVISION OF AMPUTATION RIGHT LEG performed by Jonatan Corona MD at [address] [facility] Right 1/14/15    sole of foot I&D    IA DRAIN INFECT SHOULDER BURSA Left 8/18/2017    LEFT SHOULDER INCISION AND DRAINAGE performed by Jonatan Corona MD at [address] [facility] OFFICE/OUTPT [address] [facility] Right 9/20/2018    EXCISIONAL DEBRIDEMENT RIGHT BKA STUMP performed by Pily Rincon MD at [facility] [address] Right 1/16/15    2nd toe with wound vac applied    WISDOM TOOTH EXTRACTION  ? when           Subjective  Chart Reviewed: Yes(Orders, notes,  )  Patient assessed for rehabilitation services?: Yes    Subjective: Pt cooperative, flat affect. General:  Overall Orientation Status: Within Normal Limits    Vision: Within Functional Limits    Hearing: Within functional limits         Pain:  Pain Assessment  Patient Currently in Pain: Yes  Pain Level: 7  Pain Type: Surgical pain  Pain Location: Leg  Pain Orientation: Right       Social/Functional History:  Lives With: Other (comment)(Roommate )  Type of Home: House  Home Layout: Two level, Able to Live on Main level with bedroom/bathroom  Home Access: Ramped entrance  Home Equipment: Wheelchair-manual, Rolling walker     Bathroom Shower/Tub: Tub/Shower unit  Bathroom Toilet: Standard  Bathroom Equipment: Tub transfer bench  Bathroom Accessibility: Wheelchair accessible  IADL Comments: Friends assist patient with running errands and getting groceries. Receives Help From: Home health(Nursing )  ADL Assistance: Independent  Homemaking Assistance: Independent       Ambulation Assistance: Independent(in wheelchair, non-ambulatory )  Transfer Assistance: Independent    Active : No  Occupation: On disability  Additional Comments: Pt reporting he was on TCU back in Feb 2019. Pt stating he was indep with ADL tasks at home. His wheelchair will fit through all his doorways at home. Objective      Cognition Comment: Decreased safety awareness- pt completed sit pivot transfer in 180 degrees,   declined use of gait belt despite education. Pt prefers to complete tasks his way.       Perception  Overall Perceptual Status: Impaired(Pt reports having phantom limb pain- reports feelings of R knee and foot present )    Sensation  Overall Sensation Status: Impaired(Pt reports numbness and tingling in jas hands and feet- reports hx of neuropathy )      LUE AROM (degrees)  LUE AROM : WFL    RUE AROM (degrees)  RUE AROM : WFL     LUE Strength  LUE Strength Comment: 4+/5 grosssly. Pt reports he feels UB is weaker than prior to hospitalization      RUE Strength  RUE Strength Comment: 4+/5 grosssly. Pt reports he feels UB is weaker than prior to hospitalization      RUE Tone: Normotonic  LUE Tone: Normotonic    Transfers  Sit to stand: Contact guard assistance  Stand to sit: Contact guard assistance    Balance  Sitting Balance: Stand by assistance  Standing Balance: Contact guard assistance(unsteady, able to release 1 UE only briefly )     Time: x 30 sec x 2 trials   Activity: clothign management  Comment: Pt used RW to stand, reports he doesnt typically use RW to manage clothing at home. He states he uses the wheelchair       Functional Mobility  Functional - Mobility Device: Wheelchair  Activity: Retrieve items; To/from bathroom  Assist Level: Modified independent   Functional Mobility Comments: Pt reports he used the wheelchair as primary mode of mobilty at home.   Tennis shoe on     Eatin - Patient feeds self   Groomin - Requires setup/cues to do all tasks(set up to use mouthwash )   Bathin - Able to bathe 8-9 areas(CGA while standing to wash bottom, A to wash L foot )   Dressing-Upper: 5 - Requires setup/supervision/cues and/or requires assist with presthesis/brace only(SBA to don shirt seated in wheelchair )   Dressing-Lower: 3 - Requires assist with 2-3 parts of dressing(A to thread clothing, A with L shoe, A to pull up pants )   Toiletin - Requires setup/supervision/cues(Pt used urinal with set up )   Toilet Transfer: 4 - Requires steadying assistance only < 25% assist(CGA sit pivot to and from standard toilet using grab bar (pt does not have one at home) )   Tub Transfer: 0 - Activity does not occur       Activity Tolerance:  Activity Tolerance: Patient limited by fatigue  Activity Tolerance: Intermittent rest breaks needed during treatment due to fatigue. Pt reports he wouldnt usually get this tired during activity. Treatment Initiated:  Pt completed ADL routine as above, see FIM scores for details. Pt completed IADL laundry task seated in wheelchair to load clothing in washing machine, then transferrring and standing with CGA while releasing 1 UE to set washing machine. Pt propelled wheelchair to and from rehab apartment with supervision, needing 2 rest breaks due to UB fatigue. Assessment:  Assessment: Pt s/p AKA with decreased ability to complete ADL tasks and mobility at [facility]. Pt demo decreased safety awareness during session as well. Patient would benefit from skilled OT services to increase his indep with ADL tasks and to educate on safety awarenss during ADL tasks and transfers. Performance deficits / Impairments: Decreased ADL status, Decreased safe awareness, Decreased balance, Decreased endurance, Decreased strength  Prognosis: Good    Clinical Decision Making: Clinical Decision making was of Low Complexity as the result of analysis of data from a problem focused assessment, a consideration of a limited number of treatment options, no significant comorbidities affecting the plan of care and no modification or assistance required to complete the evaluation. Discharge Recommendations:  Discharge Recommendations: Home with assist PRN    Patient Education:  Patient Education: OT POC, rehab schedule, safety with transfers, goals, team conference     Equipment Recommendations: Other: Pt has a tub transfer bench. Uses a standard toilet, but denies needs. Recommend grab bar near toilet instead of using towel bar. Safety:  Safety Devices in place: Yes  Type of devices:  All fall risk precautions in place, Gait belt, Call light within reach, Left in chair    Plan:  Times per week: 5x/wk for 90 min and 1x/wk for 30 min    Current Treatment Recommendations: Patient/Caregiver Education & Training, Strengthening, Balance Training, Functional Mobility Training, Safety Education & Training, Self-Care / ADL, Endurance Training    Goals:  Patient goals : Improve my strength and become steadier     Short term goals  Time Frame for Short term goals: 1 week   Short term goal 1: Pt will tolerate standing 1 min while releasing 1 UE from walker to improve balance needed to manage clothing. Short term goal 2: Pt will complete sit pivot transfer using safe technique with SBA to improve safety when transferring to toilet. Short term goal 3: Pt will complete LB dressing tasks with supervision to improve indep with self care at home. Short term goal 4: Pt will preform IADL task from wheelchair level with mod I and no cues for safety to improve indep within home. Long term goals  Time Frame for Long term goals : 2 weeks   Long term goal 1: Pt will complete shower and dressing routine with mod I to return to prior level of independence. Long term goal 2: Pt will complete BUE HEP using handout with Mod I to improve UB strength to 5/5. Evaluation Complexity: Based on the findings of patient history, examination, clinical presentation, and decision making during this evaluation, this patient is of low complexity.

## 2019-04-23 NOTE — PROGRESS NOTES
[facility]  INPATIENT PHYSICAL THERAPY  EVALUATION  [facility]    Time In: 1130  Time Out: 1230  Timed Code Treatment Minutes: 45 Minutes  Minutes: 60      Date: 2019  Patient Name: Kaye Ayala,  Gender:  male        MRN: [id number]  : 1970  (44 y.o.)      Referring Practitioner: LIDIA Santillan MD  Diagnosis: above knee amputation of right lower extremity  Treatment Diagnosis: R AKA  Additional Pertinent Hx: HX:  BKA with wound over fibula. Underwent multiple debridements with no success. Pt admitted for AKA, performed 2019. To [facility] on      Past Medical History:   Diagnosis Date    Bipolar 2 disorder [facility])     previously followed with Dr. Quita Mejía and Magdiel Hamm in [facility] Diabetes mellitus type 2, uncontrolled ([address]     HgbA1c on 2019 was 9.1.     Diabetic polyneuropathy ([address]     Diabetic ulcer of right foot associated with type 2 diabetes mellitus ([address] 12/10/2015    Essential hypertension     \"never been on b/p medication that I know of\"    GERD (gastroesophageal reflux disease)     Hammer toe of left foot     Heart murmur     denies any chest pain or palpitations    History of tobacco abuse     Hx of AKA (above knee amputation), right ([address] 2019    Dr. Oren Zuñiga edema, diabetic, bilateral ([address] 2018    Dr. 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 ([address] 2016     Past Surgical History:   Procedure Laterality Date    ABSCESS DRAINAGE Right     foot    AMPUTATION ABOVE KNEE Right 2019    RIGHT ABOVE KNEE AMPUTATION performed by Tessy Tate MD at [address] Right 2016    I & D    INCISION AND DRAINAGE Right 2019    I&D RIGHT STUMP performed by Tessy Tate MD at [address] [facility] Right 07/20/2016    LEG AMPUTATION BELOW KNEE Right 4/4/2019    I&D AND REVISION OF AMPUTATION RIGHT LEG performed by Rena Cantrell MD at [address] [facility] Right 1/14/15    sole of foot I&D    CA DRAIN INFECT SHOULDER BURSA Left 8/18/2017    LEFT SHOULDER INCISION AND DRAINAGE performed by Rena Cantrell MD at [address] [facility] [address]PT [address] Right 9/20/2018    EXCISIONAL DEBRIDEMENT RIGHT BKA STUMP performed by Jerson Colin MD at [address] Right 1/16/15    2nd toe with wound vac applied    WISDOM TOOTH EXTRACTION  ? when       Restrictions/Precautions:  Fall Risk, Weight Bearing, General Precautions     Right Lower Extremity Weight Bearing: Non Weight Bearing         Other position/activity restrictions: right AKA        Subjective:  Chart Reviewed: Yes  Patient assessed for rehabilitation services?: Yes  Family / Caregiver Present: No  Subjective: pt in w/c on arrival, pleasant and agrees to therapy    General:  Overall Orientation Status: Within Normal Limits  Follows Commands: Within Functional Limits    Vision: Within Functional Limits    Hearing: Within functional limits     Pain:  Yes. Pain Assessment  Pain Assessment: 0-10  Pain Level: 5  Pain Type: Surgical pain  Pain Location: Leg(stump)  Pain Orientation: Right  Pain Descriptors: Aching       Social/Functional History:    Lives With: Other (comment)(Roommate)  Type of Home: House  Home Layout: Two level, Able to Live on Main level with bedroom/bathroom  Home Access: Ramped entrance  Home Equipment: Wheelchair-manual, Rolling walker(wheelchair not in good shape)     Bathroom Shower/Tub: Tub/Shower unit  Bathroom Toilet: Standard  Bathroom Equipment: Tub transfer bench  Bathroom Accessibility: Wheelchair accessible  IADL Comments: Friends assist patient with running errands and getting groceries.      Receives Help From: Home health(nursing)  ADL Assistance: Independent  Homemaking Assistance: Independent  Ambulation Assistance: Independent(wheelchair)  Transfer Assistance: Independent    Active : No  Occupation: On disability  Additional Comments: Pt reporting he was on TCU back in Feb 2019. Pt stating he was indep with ADL tasks at home. His wheelchair will fit through all his doorways at home.        Objective:       RLE AROM: WFL  RLE General AROM: hip, R AKA         LLE AROM : WFL                                  Strength RLE: Exception  Comment: not formally assessed    Strength LLE: WFL       Sensation  Overall Sensation Status: Impaired(neuropathy in L foot and B hands)    Balance  Sitting - Static: Good  Sitting - Dynamic: Good  Standing - Static: Fair  Standing - Dynamic: Fair    Rolling to Left: Stand by assistance  Rolling to Right: Stand by assistance  Supine to Sit: Minimal assistance(pt grasps this PTs hand to assist to pull trunk to midline)  Sit to Supine: Stand by assistance  Scooting: Stand by assistance    Transfers  Sit to Stand: Contact guard assistance(cues for hand placement)  Stand to sit: Contact guard assistance(cues for hand placement)  Stand Pivot Transfers: Contact guard assistance  Car Transfer: Contact guard assistance       Ambulation 1  Surface: level tile  Device: Rolling Walker  Assistance: Contact guard assistance  Quality of Gait: decreased velocity and aiyana, varying hop length on L, increased reliance and WB through UEs, trunk sway in all directions  Distance: 12ft  Comments: refused further distance secondary to pain in R stump                   Wheelchair Activities  Propulsion: Yes    Propulsion 1  Propulsion: Manual  Level: Level Tile  Method: DI ROJAS LLE  Level of Assistance: Modified independent  Description/ Details: good pace and safety awareness, difficulty in tight spaces requiring cues to negotiate safely  Distance: 200ft x2, 15ft ramp    Exercises:  Comments: seated ankle pumps, marches B, glute sets B, and hip abd B. also seated long arc quads, hamstring curls with tband x15 reps each to increase strength and improve mobility         Activity Tolerance:  Activity Tolerance: Patient limited by pain; Patient Tolerated treatment well    Treatment Initiated: see above mobility and therex    Assessment: Body structures, Functions, Activity limitations: Decreased functional mobility , Decreased ADL status, Decreased strength, Decreased endurance, Decreased balance  Assessment: Pt presents this date with new R AKA and decreased tolerance to activity secondary to pain in R stump with all movement especially walking. He requires cues for proper sfaety during transfers at this time.  he will benefit from skilled PT to maximize independence and mobility prior to returning home  Prognosis: Good    Clinical Presentation: High - Unstable with Unpredictable Characteristics: varying pain levels, new AKA, fall risk, I PTA, poor safety awareness:    Decision Making: High Complexitybased on patient assessment and decision making process of determining plan of care and establishing reasonable expectations for measurable functional outcomes         Discharge Recommendations:  Discharge Recommendations: Continue to assess pending progress    Patient Education:  Patient Education: POC, AKA, transfers, gait, safety awareness    Equipment Recommendations:  Equipment Needed: Yes(w/c, has RW)    Safety:  Type of devices: Gait belt, Nurse notified, Call light within reach, Left in chair, All fall risk precautions in place, Positioning belt, Patient at risk for falls(w/c)    Plan:  Times per week: 5x/wk 90 min, 1x/wk 30 min  Current Treatment Recommendations: Strengthening, Transfer Training, Wheelchair Mobility Training, Patient/Caregiver Education & Training, Functional Mobility Training, Safety Education & Training, Positioning, Gait Training, Balance Training, Endurance Training, ADL/Self-care Training, Stair training, Home Exercise Program, Equipment Evaluation, Education, & procurement    Goals:  Patient goals : go home  Short term goals  Time Frame for Short term goals: 1 week  Short term goal 1: Pt to perform supine to/from sit at mod I to get in and out of bed  Short term goal 2: Pt to perform sit to/from stand at S with proper hand placement to rise from bed or chair  Short term goal 3: Pt to perform stand pivot with RW at SBA to get to and from bed and wheelchair  Short term goal 4: Pt to ambulate 30ft with RW at SBA to walk to and from restroom  Short term goal 5: Pt to propel wheelchair community distances and up and down 25 ft ramp at mod I for functional mobility  Long term goals  Time Frame for Long term goals : 2 weeks  Long term goal 1: Pt to perform sit to/from stand at mod I with proper hand placement to rise from bed or chair  Long term goal 2: Pt to perform stand pivot with RW at mod I to get to and from bed and wheelchair  Long term goal 3: Pt to ambulate 50ft with RW at mod I to walk to and from restroom  Long term goal 4: Pt to negotiate car transfer at S for transport to and from medical appts    Evaluation Complexity: Based on the findings of patient history, examination, clinical presentation, and decision making during this evaluation, the evaluation of Missy Rahman  is of high complexity.

## 2019-04-23 NOTE — PROGRESS NOTES
[facility]  Recreational Therapy  Daily Note  [address]    Time Spent with Patient: 120 minutes    Date:  4/23/2019       Patient Name: Fawad Morgan      MRN: [id number]      YOB: 1970 (44 y.o.)       Gender: male  Diagnosis: Above knee amputation of right lower extremity   Referring Practitioner: Dr. Chapis Quintanilla     RESTRICTIONS/PRECAUTIONS:  Restrictions/Precautions: Fall Risk, Weight Bearing, General Precautions  Vision: Within Functional Limits  Hearing: Within functional limits    PAIN: 8    SUBJECTIVE:  I will play    OBJECTIVE:  Pt self propelled w/c with MI from rehab unit to TCU to play cards with peers-affect brightened throughout-at the beginning pt did not initiate conversation but as the games went on and his pain became a little more tolerable he conversed more-enjoyed getting out of his room-    Social Interaction: 6 - Patient requires medication for mood and/or effect    Patient Education  New Education Provided: Importance of Leisure,     Electronically signed by: GERMANIA Merrill  Date: 4/23/2019

## 2019-04-23 NOTE — PLAN OF CARE
Problem: Nutrition  Goal: Optimal nutrition therapy  Outcome: Ongoing  Note:   Nutrition Problem: Increased nutrient needs  Intervention: Food and/or Nutrient Delivery: Continue current diet, Start ONS(Pascual BID.   Obtain actual weight)  Nutritional Goals: Pt to consume 75% or more most meals during LOS

## 2019-04-23 NOTE — H&P
Physical Medicine & Rehabilitation  History and Physical      Chief Complaint and Reason for Rehabilitation Admission: RIGHT above knee amputation    History of Present Illness:  Talon Owens is a 44 y.o. 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 He was admitted 4/22/2019 to the inpatient rehabilitation unit. Original admission 4/18/2019 for for complaint of pain secondary to a wound in his prior below knee amputation. He originally had a RIGHT below-knee amputation for persistent osteomyelitis in his RIGHT foot on 7/20/2016 and subsequently developed a poorly healing wound and osteomyelitis with Streptococcus agalactiae. On 4/4/2019 he underwent incision and drainage with saucerization of the RIGHT tibia and fibula by Dr. Marcia Gonsalves and a wound VAC was placed and he was discharged to home with home health with IV Rocephin. He had low-grade temperatures and leukocytosis noted at home by the home health nurse and he was referred to come back into the emergency department for evaluation. On 4/18/2019 he was agreeable to having a RIGHT above-knee amputation by Dr. Marcia Gonsalves. The patient is a poorly controlled type II diabetic and has not had controlled hemoglobin A1c values at any time from 2015 2 2019.     At time of initial consultation patient is resting comfortably in the hospital bed. He states that his current pain level is 8 out of 10 and that his current pain regimen is working for him.   The surgical site is intact, with staple closure. He states that he is agreeable to coming to the acute inpatient rehabilitation unit to improve his function and ability for self-care. He states that his last bowel movement was on 4/20. In the interim his pain is controlled. Appetite is good. He is tolerating his offered therapies. Had a bowel movement today. Low vitamin D level discussed and he is agreeable to supplementation. Previously was independent of ADLs and used Wheelchair AD for mobility prior to recent admission. Initially has ambulated 15' with RW at [facility] with PT. With therapy is SBA for bed mobility, SBA/CGA for transfers, and SBA/CGA with balance.     ROM restrictions, WB restrictions, precautions: Restrictions/Precautions: Fall Risk, Weight Bearing, General Precautions  Other position/activity restrictions: right AKA   Right Lower Extremity Weight Bearing: Non Weight Bearing    Fall Risk    Current Rehabilitation Assessments:  Physical therapy: FIMS:  Bed Mobility: Scooting: Stand by assistance    Transfers: Sit to Stand: Contact guard assistance  Stand to sit: Contact guard assistance  Stand Pivot Transfers: Contact guard assistance(with RW), Ambulation 1  Surface: level tile  Device: Rolling Walker  Assistance: Contact guard assistance  Quality of Gait: decreased velocity and aiyana, varying hop length on L, increased reliance and WB through UEs, trunk sway in all directions  Distance: 12ft  Comments: refused further distance secondary to pain in R stump,      FIMS: Bed, Chair, Wheel Chair: 4 - Requires steadying assistance only <25% assist  and/or requires assist with one leg only  Walk: 0 - Activity Not Assessed/Does Not Occur  Wheel Chair: 5 - Supervision Requires standby supervision or cuing to operate wheelchair at least 150 feet  Distance Traveled in Wheel Chair: 200ft  Stairs: 0 - Activity Does not Occur ( 0 only for the admission assessment), PT Equipment Recommendations  Equipment Needed: Yes(w/c, has RW), Assessment: Pt with improved safety awareness during transfers this date. he continues to fatigue quickly especially at L LE requiring frequent rest breaks    Occupational therapy: FIMS:  Eatin - Patient feeds self  Groomin - Requires setup/cues to do all tasks  Bathin - Able to bathe 8-9 areas(CGA while standing to wash bottom, A to wash L foot )  Dressing-Upper: 5 - Requires setup/supervision/cues and/or requires assist with presthesis/brace only(SBA to don shirt seated in wheelchair )  Dressing-Lower: 3 - Requires assist with 2-3 parts of dressing(A to thread clothing, A with L shoe, A to pull up pants )  Toiletin - Requires setup/supervision/cues(Pt used urinal with set up )  Toilet Transfer: 4 - Requires steadying assistance only < 25% assist  Tub Transfer: 0 - Activity does not occur,  , Assessment: Pt s/p AKA with decreased ability tocomplete ADL tasks and mobility at [facility]. Pt demo decreased safety awareness during session as well. tp would benefit from skilled OTs ervices to increase his indep [facility] ADL atsks and to educate on safety awarenss during ADL tasks and transfers. Speech therapy: FIMS: Comprehension: 5 - Patient understands basic needs (hungry/hot/pain)  Expression: 6 - Device used to express complex ideas/needs  Social Interaction: 6 - Patient requires medication for mood and/or effect  Problem Solvin - Patient able to solve simple/routine tasks  Memory: 6 - Patient requires device to recall (e.g. memory book)    Past Medical History:      Diagnosis Date    Bipolar 2 disorder ([facility]     previously followed with Dr. Alida Lara and Jia Courser in [facility] Diabetes mellitus type 2, uncontrolled ([facility]     HgbA1c on 2019 was 9.1.     Diabetic polyneuropathy ([facility]     Diabetic ulcer of right foot associated with type 2 diabetes mellitus ([facility] 12/10/2015    Essential hypertension     \"never been on b/p medication that I know of\"    GERD PRN  acetaminophen (TYLENOL) tablet 650 mg, 650 mg, Oral, Q4H PRN  cefTRIAXone (ROCEPHIN) 2 g IVPB in D5W 50ml minibag, 2 g, Intravenous, Q24H  cyclobenzaprine (FLEXERIL) tablet 10 mg, 10 mg, Oral, TID PRN  insulin glargine (LANTUS) injection vial 55 Units, 55 Units, Subcutaneous, BID  insulin lispro (HUMALOG) injection vial 18 Units, 18 Units, Subcutaneous, TID AC  magnesium hydroxide (MILK OF MAGNESIA) 400 MG/5ML suspension 30 mL, 30 mL, Oral, Daily PRN  mirtazapine (REMERON) tablet 15 mg, 15 mg, Oral, Nightly  ondansetron (ZOFRAN) injection 4 mg, 4 mg, Intravenous, Q6H PRN  oxyCODONE-acetaminophen (PERCOCET) 5-325 MG per tablet 1 tablet, 1 tablet, Oral, Q4H PRN  oxyCODONE-acetaminophen (PERCOCET) 5-325 MG per tablet 2 tablet, 2 tablet, Oral, Q4H PRN  sertraline (ZOLOFT) tablet 150 mg, 150 mg, Oral, Daily  docusate sodium (COLACE) capsule 100 mg, 100 mg, Oral, BID  senna (SENOKOT) tablet 17.2 mg, 2 tablet, Oral, Nightly  ondansetron (ZOFRAN) tablet 4 mg, 4 mg, Oral, Q8H PRN     Social History:   reports that he quit smoking about 34 years ago. His smoking use included cigars. He has a 65.00 pack-year smoking history. He has never used smokeless tobacco. He reports that he drank alcohol. He reports that he does not use drugs. [facility] [place] [id number]. Social/Functional History  Lives With: Other (comment)(Roommate)  Type of Home: House  Home Layout: Two level, Able to Live on Main level with bedroom/bathroom  Home Access: Ramped entrance  Bathroom Shower/Tub: Tub/Shower unit  Bathroom Toilet: Standard  Bathroom Equipment: Tub transfer bench  Bathroom Accessibility: Wheelchair accessible  Home Equipment: Wheelchair-manual, Rolling walker(wheelchair not in good shape)  Receives Help From: Home health(nursing)  ADL Assistance: Independent  Homemaking Assistance: Independent  Ambulation Assistance: Independent(wheelchair)  Transfer Assistance: Independent  Active : No  Occupation:  On disability  IADL Comments: Friends assist patient with running errands and getting groceries. Additional Comments: Pt reporting he was on TCU back in Feb 2019. Pt stating he was indep with ADL tasks at home. His wheelchair will fit through all his doorways at home. Functional History:  Prior Function  Lives With: Other (comment)(Roommate)  Receives Help From: Home health(nursing)  ADL Assistance: Independent  Homemaking Assistance: Independent  Ambulation Assistance: Independent(wheelchair)  Transfer Assistance: Independent  Additional Comments: Pt reporting he was on TCU back in Feb 2019. Pt stating he was indep with ADL tasks at home. His wheelchair will fit through all his doorways at home.    IADL History:  Lift/Transfer Equipment Utilized: None    Family History:       Problem Relation Age of Onset    Diabetes Mother     Other Mother         pneumonia, H1N1    Depression Mother     Early Death Mother     High Blood Pressure Mother     High Cholesterol Mother     Vision Loss Maternal Grandmother     Arthritis Maternal Grandfather     Heart Disease Maternal Grandfather      Review of Systems:  CONSTITUTIONAL:  negative  EYES:  positive for  visual disturbance  HEENT:  negative  RESPIRATORY:  negative  CARDIOVASCULAR:  negative  GASTROINTESTINAL:  positive for constipation  GENITOURINARY:  negative  SKIN:  negative  HEMATOLOGIC/LYMPHATIC:  negative  MUSCULOSKELETAL:  positive for  myalgias, arthralgias, pain and bone pain  NEUROLOGICAL:  positive for visual disturbance, pain and tingling  BEHAVIOR/PSYCH:  positive for Bipolar 1 disorder  10 point system review otherwise negative    Physical Exam:  /73   Pulse 94   Temp 97.8 °F (36.6 °C) (Oral)   Resp 18   Ht 5' 6\" (1.676 m)   SpO2 94%   BMI 35.83 kg/m²     awake  Orientation:   person, place, time, situation  Mood: dysthymic  Affect: flat  General appearance: Patient is well nourished, well developed, well groomed and in no acute distress, overweight, appearing stated age, RIGHT AKA, up in [facility] eating breakfast     Memory:   grossly normal  Attention/Concentration: normal  Language:  normal     Cranial Nerves:  cranial nerves II-XII are grossly intact  ROM:  normal NWB in RLL  Motor Exam:  Motor exam is 5 out of 5 all extremities with the exception of only RIGHT hip being testable     Tone:  normal  Muscle bulk: within normal limits  Sensory:  Touch:  Left Lower Extremity:  abnormal - over LEFT foot  Coordination:   normal  Deep Tendon Reflexes:  Right Bicep:  1+  Left Bicep:  1+  Left Knee:  1+     Skin: warm and dry, no rash or erythema and RIGHT thigh incision with staple closure C/D/I  Peripheral vascular: Pulses: Normal upper and lower extremity pulses with RIGHT LL not tetstable; Edema: 1+    Diagnostics:  Recent Results (from the past 24 hour(s))   POCT glucose    Collection Time: 04/22/19 11:07 PM   Result Value Ref Range    POC Glucose 131 (H) 70 - 108 mg/dl   CBC    Collection Time: 04/23/19  4:57 AM   Result Value Ref Range    WBC 10.1 4.8 - 10.8 thou/mm3    RBC 3.80 (L) 4.70 - 6.10 mill/mm3    Hemoglobin 10.5 (L) 14.0 - 18.0 gm/dl    Hematocrit 31.8 (L) 42.0 - 52.0 %    MCV 83.7 80.0 - 94.0 fL    MCH 27.6 26.0 - 33.0 pg    MCHC 33.0 32.2 - 35.5 gm/dl    RDW-CV 14.8 (H) 11.5 - 14.5 %    RDW-SD 44.9 35.0 - 45.0 fL    Platelets 825 561 - 254 thou/mm3    MPV 8.6 (L) 9.4 - 12.4 fL   Comprehensive Metabolic Panel    Collection Time: 04/23/19  4:57 AM   Result Value Ref Range    Glucose 122 (H) 70 - 108 mg/dL    CREATININE 0.6 0.4 - 1.2 mg/dL    BUN 12 7 - 22 mg/dL    Sodium 139 135 - 145 meq/L    Potassium 4.0 3.5 - 5.2 meq/L    Chloride 102 98 - 111 meq/L    CO2 24 23 - 33 meq/L    Calcium 9.0 8.5 - 10.5 mg/dL    AST 10 5 - 40 U/L    Alkaline Phosphatase 72 38 - 126 U/L    Total Protein 7.5 6.1 - 8.0 g/dL    Alb 3.1 (L) 3.5 - 5.1 g/dL    Total Bilirubin 0.2 (L) 0.3 - 1.2 mg/dL    ALT <5 (L) 11 - 66 U/L   Vitamin D 25 Hydroxy Collection Time: 04/23/19  4:57 AM   Result Value Ref Range    Vit D, 25-Hydroxy 9 (L) 30 - 100 ng/ml   Anion Gap    Collection Time: 04/23/19  4:57 AM   Result Value Ref Range    Anion Gap 13.0 8.0 - 16.0 meq/L   Glomerular Filtration Rate, Estimated    Collection Time: 04/23/19  4:57 AM   Result Value Ref Range    Est, Glom Filt Rate >90 ml/min/1.73m2   POCT glucose    Collection Time: 04/23/19  8:03 AM   Result Value Ref Range    POC Glucose 121 (H) 70 - 108 mg/dl   POCT glucose    Collection Time: 04/23/19 12:31 PM   Result Value Ref Range    POC Glucose 103 70 - 108 mg/dl   POCT glucose    Collection Time: 04/23/19  5:16 PM   Result Value Ref Range    POC Glucose 89 70 - 108 mg/dl      Lab Results   Component Value Date    LABA1C 9.1 (H) 04/02/2019     Lab Results   Component Value Date     06/30/2016     Recent Labs     04/23/19  0457 04/19/19  0800 04/16/19  0314   WBC 10.1 13.2* 13.1*   HGB 10.5* 11.0* 11.7*   HCT 31.8* 31.9* 33.9*   MCV 83.7 82.9 83.5    370 418*     Impression:  1. S/p RIGHT above knee amputation for osteomyelitis of prior below knee amputation non healing wound by Dr. Ailyn Baird on 4/18/2019.  2. Gait instability. 3. S/p incision and drainage with saucerization of RIGHT tibia and fibula with poorly healing wound and osteomyelitis with Streptococcus agalactiae by Dr. Ailyn Baird on 4/4/2019.  4. S/p RIGHT below knee amputation for osteomyelitis of the RIGHT foot on 7/20/2016.  5. Type 2 DM, poorly controlled. Last hemoglobin A1c was 9.1 on 4/2/2019. Range 8.1 to 12.2 from 2015 to 2019. 6. Post operative blood loss anemia with initial Hgb of 11.7 on 4/16 down to 10.5 on 4/23.  7. Hyponatremia. 8. Leukocytosis. 9. Diabetic polyneuropathy. 10. Hypertension. 11. Diabetic macular edema bilaterally. 12. Gastroesophageal reflux disease. 13. Bipolar 2 disorder. 14. Non compliance with medical treatment. 15. History of marijuana abuse. 16. Hypovitaminosis D.      Plan:   The patient demonstrates good potential to participate in an inpatient rehabilitation program involving at least 3 hours per day, 5 days per week of intensive rehabilitation. Rehabilitation services will include PT and OT/RT in order to improve functional status prior to discharge. Family education and training will be completed. Equipment evaluations and recommendations will be completed as appropriate. Medical management: Per primary team.    Consultants:  Orthopedic Surgery, Infectious Disease, Physical Medicine    Narcotic usage:  Percocet Last 24 hour usage 40mg. Last BM:     Stool Assessment  Incontinence: No (04/23/19 0517)    Acute/Rehabilitation Problems:  1. S/p RIGHT above knee amputation for osteomyelitis of prior below knee amputation non healing wound by Dr. [place] Anthony on 4/18/2019.  1. Pain Control. 1. Percocet. 2. Tylenol. 3. Flexeril. 2. Wound care. 3. Antibiotics. 1. Ceftriaxone q24H. End 4/25.  2. Gait instability. 1. PT/OT  3. Type 2 DM, poorly controlled. 1. Last hemoglobin A1c was 9.1 on 4/2/2019. Range 8.1 to 12.2 from 2015 to 2019.  2. Lantus 55units BID. 3. Humalog 18units before meals. 4. DIET CARB CONTROL. 5. Dietary Nutrition Supplements: Wound Healing Oral Supplement. 4. Post operative blood loss anemia with initial Hgb of 11.7 on 4/16 down to 10.5 on 4/23. 1. Monitor. 5. Hyponatremia. 1. 139 on 4/23. Resolved. 6. Leukocytosis. 1. WBC 10.1 on 4/23. Resolved. 7. Nutrition:  Consultation to dietician for nutritional counseling and recommendations. 8. TotP 7.5, alb 3.1, Vitamin 25OH level of 9 on 4/23. 1. Cholecalciferol 5000IU BID. 9. Electrolytes. 1. Normal on 4/23. 10. Bladder: No issues. 11. Bowel: Senna, colace, MOM  12. Rehabilitation nursing will be involved for bowel, bladder, skin, and pain management. Nursing will also provide education and training to patient and family. 13. Prophylaxis:  DVT: SCDs LEFT leg. GI: Pepcid.   14.  and case management consultations for coordination of care and discharge planning. Chronic Problems:  1. Diabetic polyneuropathy. 2. Hypertension. 1. No current medications. 3. Diabetic macular edema bilaterally. 4. Gastroesophageal reflux disease. 1. Pepcid. 5. Bipolar 2 disorder. 1. Sertraline. 2. Mirtazapine for sleep. 6. Non compliance with medical treatment. Labs:  1. 4/23. Infectious Disease:  1. Ceftriaxone    The main medical problem(s) and comorbidities being actively managed by the physicians and requiring 24 hour rehabilitation nursing care during this stay include Wound Care, pain control, DM, HTN, antibiotic management, anemia, electrolyte management. The domains of functional impairment present in this patient which will require an intensive and interdisciplinary rehabilitation environment include self care, mobility, bowel/bladder management, pain management and safety. Estimated length of stay for this admission 10 days. Anticipated disposition: Home with [address] [place] [address]. The potential to achieve that is very good. The post admission physician evaluation (TK) is consistent with the pre-admission assessment. See above findings to reflect the elements required in the TK. Patient's admitting condition is consistent with the findings of the preadmission assessment by the rehabilitation admissions coordinator.     Vinny Bernardo MD

## 2019-04-23 NOTE — PLAN OF CARE
Problem: Pain:  Goal: Pain level will decrease  Description  Pain level will decrease  Outcome: Ongoing    Patient will have a decrease in pain during this admission as evidenced by patient report related to pain   Goal: Control of acute pain  Description  Control of acute pain  Outcome: Ongoing    Patient will have a decrease in pain during this admission as evidenced by patient report related to pain   Goal: Control of chronic pain  Description  Control of chronic pain  Outcome: Ongoing    Patient will have a decrease in pain during this admission as evidenced by patient report related to pain      Problem: Falls - Risk of:  Goal: Will remain free from falls  Description  Will remain free from falls  Outcome: Ongoing    Patient will remain free from falls during this admission as evidenced by no falls during this admission patient and staff will use safety devices and techniques   Goal: Absence of physical injury  Description  Absence of physical injury  Outcome: Ongoing     Problem: Risk for Impaired Skin Integrity  Goal: Tissue integrity - skin and mucous membranes  Description  Structural intactness and normal physiological function of skin and  mucous membranes.   Outcome: Ongoing     Problem: Infection - Surgical Site:  Goal: Will show no infection signs and symptoms  Description  Will show no infection signs and symptoms  Outcome: Ongoing     Problem: Mood - Altered:  Goal: Mood stable  Description  Mood stable  Outcome: Ongoing

## 2019-04-24 LAB
GLUCOSE BLD-MCNC: 121 MG/DL (ref 70–108)
GLUCOSE BLD-MCNC: 140 MG/DL (ref 70–108)
GLUCOSE BLD-MCNC: 140 MG/DL (ref 70–108)
GLUCOSE BLD-MCNC: 166 MG/DL (ref 70–108)
GLUCOSE BLD-MCNC: 71 MG/DL (ref 70–108)

## 2019-04-24 PROCEDURE — 82948 REAGENT STRIP/BLOOD GLUCOSE: CPT

## 2019-04-24 PROCEDURE — 1180000000 HC REHAB R&B

## 2019-04-24 PROCEDURE — 97530 THERAPEUTIC ACTIVITIES: CPT

## 2019-04-24 PROCEDURE — 6370000000 HC RX 637 (ALT 250 FOR IP): Performed by: ORTHOPAEDIC SURGERY

## 2019-04-24 PROCEDURE — 6370000000 HC RX 637 (ALT 250 FOR IP): Performed by: PHYSICAL MEDICINE & REHABILITATION

## 2019-04-24 PROCEDURE — 6360000002 HC RX W HCPCS: Performed by: INTERNAL MEDICINE

## 2019-04-24 PROCEDURE — 97110 THERAPEUTIC EXERCISES: CPT

## 2019-04-24 PROCEDURE — 2709999900 HC NON-CHARGEABLE SUPPLY

## 2019-04-24 PROCEDURE — 2580000003 HC RX 258: Performed by: INTERNAL MEDICINE

## 2019-04-24 PROCEDURE — 97535 SELF CARE MNGMENT TRAINING: CPT

## 2019-04-24 PROCEDURE — 97542 WHEELCHAIR MNGMENT TRAINING: CPT

## 2019-04-24 PROCEDURE — 97116 GAIT TRAINING THERAPY: CPT

## 2019-04-24 RX ORDER — FUROSEMIDE 20 MG/1
20 TABLET ORAL DAILY
Status: COMPLETED | OUTPATIENT
Start: 2019-04-24 | End: 2019-05-01

## 2019-04-24 RX ORDER — POTASSIUM CHLORIDE 20 MEQ/1
20 TABLET, EXTENDED RELEASE ORAL
Status: COMPLETED | OUTPATIENT
Start: 2019-04-25 | End: 2019-05-01

## 2019-04-24 RX ADMIN — FAMOTIDINE 20 MG: 20 TABLET ORAL at 07:51

## 2019-04-24 RX ADMIN — OXYCODONE AND ACETAMINOPHEN 2 TABLET: 5; 325 TABLET ORAL at 16:32

## 2019-04-24 RX ADMIN — OXYCODONE AND ACETAMINOPHEN 2 TABLET: 5; 325 TABLET ORAL at 07:18

## 2019-04-24 RX ADMIN — CYCLOBENZAPRINE HYDROCHLORIDE 10 MG: 10 TABLET, FILM COATED ORAL at 20:35

## 2019-04-24 RX ADMIN — CEFTRIAXONE SODIUM 2 G: 2 INJECTION, POWDER, FOR SOLUTION INTRAMUSCULAR; INTRAVENOUS at 16:21

## 2019-04-24 RX ADMIN — DOCUSATE SODIUM 100 MG: 100 CAPSULE, LIQUID FILLED ORAL at 20:36

## 2019-04-24 RX ADMIN — DOCUSATE SODIUM 100 MG: 100 CAPSULE, LIQUID FILLED ORAL at 07:51

## 2019-04-24 RX ADMIN — SERTRALINE 150 MG: 100 TABLET, FILM COATED ORAL at 07:51

## 2019-04-24 RX ADMIN — OXYCODONE AND ACETAMINOPHEN 2 TABLET: 5; 325 TABLET ORAL at 11:22

## 2019-04-24 RX ADMIN — MIRTAZAPINE 15 MG: 15 TABLET, FILM COATED ORAL at 20:35

## 2019-04-24 RX ADMIN — VITAMIN D, TAB 1000IU (100/BT) 5000 UNITS: 25 TAB at 20:35

## 2019-04-24 RX ADMIN — FUROSEMIDE 20 MG: 20 TABLET ORAL at 16:21

## 2019-04-24 RX ADMIN — INSULIN GLARGINE 55 UNITS: 100 INJECTION, SOLUTION SUBCUTANEOUS at 20:40

## 2019-04-24 RX ADMIN — INSULIN LISPRO 18 UNITS: 100 INJECTION, SOLUTION INTRAVENOUS; SUBCUTANEOUS at 09:14

## 2019-04-24 RX ADMIN — VITAMIN D, TAB 1000IU (100/BT) 5000 UNITS: 25 TAB at 07:51

## 2019-04-24 RX ADMIN — INSULIN LISPRO 18 UNITS: 100 INJECTION, SOLUTION INTRAVENOUS; SUBCUTANEOUS at 18:12

## 2019-04-24 RX ADMIN — INSULIN LISPRO 18 UNITS: 100 INJECTION, SOLUTION INTRAVENOUS; SUBCUTANEOUS at 12:55

## 2019-04-24 RX ADMIN — OXYCODONE AND ACETAMINOPHEN 2 TABLET: 5; 325 TABLET ORAL at 20:36

## 2019-04-24 RX ADMIN — INSULIN GLARGINE 55 UNITS: 100 INJECTION, SOLUTION SUBCUTANEOUS at 09:14

## 2019-04-24 ASSESSMENT — PAIN SCALES - GENERAL
PAINLEVEL_OUTOF10: 6
PAINLEVEL_OUTOF10: 7
PAINLEVEL_OUTOF10: 10
PAINLEVEL_OUTOF10: 5
PAINLEVEL_OUTOF10: 10
PAINLEVEL_OUTOF10: 5
PAINLEVEL_OUTOF10: 10
PAINLEVEL_OUTOF10: 8
PAINLEVEL_OUTOF10: 6
PAINLEVEL_OUTOF10: 7
PAINLEVEL_OUTOF10: 6

## 2019-04-24 ASSESSMENT — PAIN DESCRIPTION - FREQUENCY: FREQUENCY: CONTINUOUS

## 2019-04-24 ASSESSMENT — PAIN DESCRIPTION - PAIN TYPE
TYPE: SURGICAL PAIN
TYPE: ACUTE PAIN
TYPE: SURGICAL PAIN

## 2019-04-24 ASSESSMENT — PAIN DESCRIPTION - ORIENTATION
ORIENTATION: RIGHT
ORIENTATION: RIGHT;ANTERIOR
ORIENTATION: RIGHT
ORIENTATION: RIGHT;UPPER;ANTERIOR

## 2019-04-24 ASSESSMENT — PAIN DESCRIPTION - ONSET: ONSET: ON-GOING

## 2019-04-24 ASSESSMENT — PAIN DESCRIPTION - LOCATION
LOCATION: LEG

## 2019-04-24 ASSESSMENT — PAIN DESCRIPTION - DESCRIPTORS
DESCRIPTORS: ACHING
DESCRIPTORS: TIGHTNESS;ACHING

## 2019-04-24 ASSESSMENT — PAIN DESCRIPTION - PROGRESSION: CLINICAL_PROGRESSION: NOT CHANGED

## 2019-04-24 NOTE — PLAN OF CARE
Problem: DISCHARGE BARRIERS  Goal: Patient's continuum of care needs are met  Note:   [address] [address]  Physical Medicine Case Management Assessment    ? Inpatient Rehabilitation Unit  ? Transitional Care Unit    Patient Name: Frances Mabry        MRN: [id number]    : 1970  (44 y.o.)  Gender: male     Date of Admission: 2019      Family/Social/Home Environment: Prior to hospitalization, patient was independent with ADL's and personal care. Patient lives in a type of group home/alan home (restoration home). Patient has multiple male roommates. Patient lives on main level of home and reports home is wheelchair accessible for all areas he needs. Patient has ramped entrance into home. Patient reports roommates will assist with wheelchair mobility behind home, as it is all gravel. Patient reports roommates will assist with transportation for errands and doctor appointments. Patient has rolling walker, wheelchair, and tub transfer bench. Patient reports wheelchair was given to him, not purchased with insurance. Patient was receiving home health care services for nursing, but patient reports he was able to complete IV antibiotic infusion. Patient reports meals are provided at the house, but if he wants anything else he is responsible. Patient manages own medications and finances. Patient reports checking blood sugar three to four times a day. Patient reports having all supplies and working glucometer. Patient administers own insulin (insulin pens). Patient reports using [facility]. Patient was diagnosed with diabetes at the age of 20. Patient reports goal to regain strength and be able to return to home as independent as before.      Social/Functional History  Lives With: Other (comment)(Roommate)  Type of Home: House  Home Layout: Two level, Able to Live on Main level with bedroom/bathroom  Home Access: Ramped entrance  Bathroom Shower/Tub: Tub/Shower unit  Bathroom Toilet: Standard  Bathroom Equipment: Tub transfer bench  Bathroom Accessibility: Wheelchair accessible  Home Equipment: Wheelchair-manual, Rolling walker(wheelchair not in good shape)  Receives Help From: Home health(nursing)  ADL Assistance: Independent  Homemaking Assistance: Independent  Ambulation Assistance: Independent(wheelchair)  Transfer Assistance: Independent  Active : No  Occupation: On disability  IADL Comments: Friends assist patient with running errands and getting groceries. Additional Comments: Pt reporting he was on TCU back in Feb 2019. Pt stating he was indep with ADL tasks at home. His wheelchair will fit through all his doorways at home. Contact/Guardian Information: Emergency Contacts  Healthcare Agent Appointed: Luci Castro Agent's Name: Maryam Ramirez Agent's Phone Number: doesn't have their phone numbers    [email or web address] Resources Utilized: [place] (nursing for IV infusions). Sexuality/Intimacy: No issues or concerns noted at time of SW assessment. Complementary Health Approaches: Patient with no current interest in complementary health approaches at time of SW assessment. Anticipated Needs/Discharge Plans: Anticipate patient would benefit from medical equipment and possible continued home health care if IV antibiotics still needed at discharge. SW met with patient to introduce self and explain role, complete SW assessment, and initiate discharge planning. Prior to hospitalization, patient was independent with ADL's and personal care. Patient lives in a type of group home/alan home (restoration home). Patient has multiple male roommates. Patient lives on main level of home and reports home is wheelchair accessible for all areas he needs. Patient has ramped entrance into home. Patient reports roommates will assist with wheelchair mobility behind home, as it is all gravel.  Patient reports roommates will assist with transportation for errands and doctor appointments. Patient has rolling walker, wheelchair, and tub transfer bench. Patient reports wheelchair was given to him, not purchased with insurance. Patient was receiving home health care services for nursing, but patient reports he was able to complete IV antibiotic infusion. Patient reports meals are provided at the house, but if he wants anything else he is responsible. Patient manages own medications and finances. Patient reports checking blood sugar three to four times a day. Patient reports having all supplies and working glucometer. Patient administers own insulin (insulin pens). Patient reports using [facility]. Patient was diagnosed with diabetes at the age of 20. Patient reports goal to regain strength and be able to return to home as independent as before. Anticipate patient would benefit from medical equipment and possible continued home health care if IV antibiotics still needed at discharge. SW reviewed with patient team conference on Thursday, 04/25/2019, to further discuss progress and discharge recommendations. SW to follow and maintain involvement in discharge planning.            Discharge Planning  Living Arrangements: Other (Comment)(room mate)  Support Systems: Family Members  Potential Assistance Needed: Durable Medical Equipment, Home Care  Potential Assistance Purchasing Medications: Yes  Meds-to-Beds: Does the patient want to have any new prescriptions delivered to bedside prior to discharge?: Yes  DME: Walker  Type of [address] Nursing Services  Patient expects to be discharged to[de-identified] group home  Expected Discharge Date: (Undetermined)    Electronically signed by NEFTALI Whitaker on 4/24/2019 at 11:47 AM

## 2019-04-24 NOTE — PROGRESS NOTES
health care if IV antibiotics still needed at discharge. SW reviewed with patient team conference on Thursday, 04/25/2019, to further discuss progress and discharge recommendations. SW to follow and maintain involvement in discharge planning. PHYSICAL THERAPY  The pt has met 0 STGs during his short stay on IPR. The pt's mobility and safety awareness continue to improve during his stay. He is limited by weakness, decreased balance, and poor tolerance to activity at this time. At this time he requires SBA for bed mobility and CGA for sit to/from stand and ambulation for short distances up to 25ft prior to fatigue. Pt mod I for wheelchair mobility on flat surface and CGA for ramp negotiation in wheelchair. He continues to require skilled PT services to maximize independence and mobility prior to returning home. Equipment Needed: Yes(w/c, has RW)    SPEECH THERAPY  N/A    OCCUPATIONAL THERAPY  Pt progressing towards goals meeting 1/4 STGs already during short stay on IP rehab. Pt is motivated to regain strength and balance to return home. He requires rest breaks due to fatigue and deconditioning from hospital stay,. But has already shown improvements from initial evaluation. Laron Hammans has a tendency to complete transfers his way, and would benefit from additional OT to improve safety and technique to ensure safety at home. Other: Pt has a tub transfer bench. Uses a standard toilet, but denies needs. Recommend grab bar near toilet instead of using towel bar. RECREATIONAL THERAPY  Patient has been offered participation in recreational therapy activities and participates as able. He likes to watch tv, lifts weights in his room, reads the paper and likes to play euchre-does not play euchre at home-does not initiate much conversation-states his pain is at an 8/10-nurse notified -enjoys being outside -pt is MI in w/c around unit -Pt self propelled w/c with MI from rehab unit to TCU to play cards with peers-affect brightened throughout-at the beginning pt did not initiate conversation but as the games went on and his pain became a little more tolerable he conversed more-enjoyed getting out of his room-Patient enjoyed spending time with our pet therapy dogs. Pt sitting up in his w/c. He immediately brightened when RTs brought dogs in his room. Pt appreciative of visit. -Via w/c and MI pt came to the [employer] dining room to play euchre with peers-affect brightened throughout games-at beginning of game pt c/o low blood sugar-tech came to check it and gave him some orange juice to drink-no other complaints during game-via w/c pt went back to his room with MI     NUTRITION  Weight: 211 lb 2 oz (95.8 kg) / Body mass index is 34.08 kg/m². Current diet: DIET CARB CONTROL; Dietary Nutrition Supplements: Wound Healing Oral Supplement  Please see nutrition note for details.     NURSING  Continent of Bowel and Bladder: Yes  Pain is Managed:  Yes  Signs and Symptoms of Infection:  No  Signs and Symptoms of Skin Breakdown:  No  Injury and Fall Free during Inpatient Rehabilitation Admission: Yes    Consultations/Labs/X-rays: Orthopedic Surgery, Infectious Disease, Physical Medicine    Family Education: Need to make contact with family to initiate education  Fall Risk:  Falling star program initiated  Is the patient appropriate for a stay in the functional apartment? no    Discharge Plan   Estimated Discharge Date: 5/1/2019   Destination: Home  Services at Discharge: Physical Therapy and Occupational Therapy HEP  Is patient appropriate for an outpatient driving evaluation? no  Equipment at Discharge: WC  Factors facilitating achievement of predicted outcomes: Friend support, Motivated, Cooperative, Sense of humor, Good insight into deficits, Has needed [address] at home, Home is wheelchair accessible and Knowledge about rehab  Barriers to the achievement of predicted outcomes: Pain, Impulsivity, Depression, Wound Care and Medication managment    Team Members Present at Conference:  :Pearl Whitfield [facility]  Occupational Therapist: Ridge Garcia OTR/L [id number]. Physical Therapist: Linwood Hyde  Speech Therapist: DANTE De León . Nurse: Verena Dunaway, BEA   Psychologist: Jacquelyn Peng, PhD.    I approve the established interdisciplinary plan of care as documented within the medical record of Leann Key.     Kevyn Ace

## 2019-04-24 NOTE — PLAN OF CARE
Problem: Pain:  Goal: Pain level will decrease  Description  Pain level will decrease  4/23/2019 2236 by Carlyn Palma RN  Outcome: Ongoing  Note:   Patient complaining of pain this shift. Prn pain medication available. Will monitor. Problem: Pain:  Goal: Control of acute pain  Description  Control of acute pain  4/23/2019 2236 by Carlyn Palma RN  Outcome: Ongoing  Note:   Patient complaining of pain this shift. Prn pain medication available. Will monitor. Problem: Pain:  Goal: Control of chronic pain  Description  Control of chronic pain  4/23/2019 2236 by Carlyn Palma RN  Outcome: Ongoing  Note:   Patient complaining of pain this shift. Prn pain medication available. Will monitor. Problem: Falls - Risk of:  Goal: Will remain free from falls  Description  Will remain free from falls  4/23/2019 2236 by Carlyn Palma RN  Outcome: Ongoing  Note:   No falls noted this shift. Continue falling star program. Bed alarm on, bed in low position. Call light and personal belongings in reach. Patient uses call light appropriately. Problem: Falls - Risk of:  Goal: Absence of physical injury  Description  Absence of physical injury  4/23/2019 2236 by Carlyn Palma RN  Outcome: Ongoing  Note:   No physical injury noted this shift. Safe environment provided. Will monitor. Problem: Risk for Impaired Skin Integrity  Goal: Tissue integrity - skin and mucous membranes  Description  Structural intactness and normal physiological function of skin and  mucous membranes. 4/23/2019 2236 by Carlyn Palma RN  Outcome: Ongoing  Note:   No skin break down noted at this time. Encouraged patient to reposition self in bed.        Problem: Infection - Surgical Site:  Goal: Will show no infection signs and symptoms  Description  Will show no infection signs and symptoms  4/23/2019 2236 by Carlyn Palma RN  Outcome: Ongoing  Note:   No s/s of infection along surgical

## 2019-04-24 NOTE — PROGRESS NOTES
with buttons/zippers only and/or requires assist with one arm only(Pt asked for assist to pull shirt down in back )  Dressing-Lower: 3 - Requires assist with 2-3 parts of dressing(Pt asked for help to pull down shirt, A with doff and razia sock and shoe. Pt able to pull pants up while standing with close SBA )  Toiletin - Requires setup/supervision/cues  Toilet Transfer: 4 - Requires steadying assistance only < 25% assist  Tub Transfer: 4 - Minimal contact assistance, pt. expends 75% or more effort(CGA squat pivot to and from shower bench ),  , Assessment: Pt progressing towards goals meeting 1/4 STGs already during short stay on IP rehab. Pt is motivated to regain strength and balance to return home. He requires rest breaks due to fatigue and deconditioning from hospital stay,. But has already shown improvements from initial evaluation. Arabella Lopez has a tendency to complete transfers his way, and would benefit from additional OT to improve safety and technique to ensure safety at home.     Speech therapy: FIMS: Comprehension: 5 - Patient understands basic needs (hungry/hot/pain)  Expression: 6 - Device used to express complex ideas/needs  Social Interaction: 6 - Patient requires medication for mood and/or effect  Problem Solvin - Patient able to solve simple/routine tasks  Memory: 6 - Patient requires device to recall (e.g. memory book)    Review of Systems:  CONSTITUTIONAL:  negative  EYES:  positive for  visual disturbance  HEENT:  negative  RESPIRATORY:  negative  CARDIOVASCULAR:  negative  GASTROINTESTINAL:  positive for constipation  GENITOURINARY:  negative  SKIN:  negative  HEMATOLOGIC/LYMPHATIC:  positive for edema  MUSCULOSKELETAL:  positive for  myalgias, arthralgias, pain and bone pain  NEUROLOGICAL:  positive for visual disturbance, pain and tingling  BEHAVIOR/PSYCH:  positive for Bipolar 1 disorder  10 point system review otherwise negative    Objective:  BP (!) 117/45   Pulse 95   Temp 98 °F (36.7 °C) (Oral)   Resp 16   Ht 5' 6\" (1.676 m)   Wt 211 lb 2 oz (95.8 kg)   SpO2 98%   BMI 34.08 kg/m²     awake  Orientation:   person, place, time, situation  Mood: within normal limits  Affect: flat  General appearance:  in no acute distress, RIGHT AKA, up in [facility]     Memory:   grossly normal  Attention/Concentration: normal  Language:  normal     ROM:  normal NWB in RLL  Motor Exam:  Motor exam is 5 out of 5 all extremities with the exception of only RIGHT hip being testable     Sensory:  Touch:  Left Lower Extremity:  abnormal - over LEFT foot  Coordination:   normal     Skin: warm and dry, no rash or erythema and RIGHT thigh incision with staple closure C/D/I  Peripheral vascular: Pulses: Normal upper and lower extremity pulses with RIGHT LL not tetstable; Edema: 2+ in RIGHT LL    Diagnostics:   Recent Results (from the past 24 hour(s))   POCT glucose    Collection Time: 04/24/19  7:36 AM   Result Value Ref Range    POC Glucose 166 (H) 70 - 108 mg/dl   POCT glucose    Collection Time: 04/24/19 11:21 AM   Result Value Ref Range    POC Glucose 140 (H) 70 - 108 mg/dl   POCT glucose    Collection Time: 04/24/19  3:39 PM   Result Value Ref Range    POC Glucose 71 70 - 108 mg/dl   POCT Glucose    Collection Time: 04/24/19  5:17 PM   Result Value Ref Range    POC Glucose 140 (H) 70 - 108 mg/dl   POCT glucose    Collection Time: 04/24/19  8:34 PM   Result Value Ref Range    POC Glucose 121 (H) 70 - 108 mg/dl     Impression:  1. S/p RIGHT above knee amputation for osteomyelitis of prior below knee amputation non healing wound by Dr. Hanna on 4/18/2019.  2. Gait instability.   3. S/p incision and drainage with saucerization of RIGHT tibia and fibula with poorly healing wound and osteomyelitis with Streptococcus agalactiae by Dr. Albert Perez on 4/4/2019.  4. S/p RIGHT below knee amputation for osteomyelitis of the RIGHT foot on 7/20/2016.  5. Type 2 DM, poorly controlled.  Last hemoglobin A1c was 9.1 on 4/2/2019.  Range 8.1 20mEq daily  11. Bladder: No issues. 12. Bowel: Senna, colace, MOM  13. Rehabilitation nursing will be involved for bowel, bladder, skin, and pain management. Nursing will also provide education and training to patient and family. 14. Prophylaxis:  DVT: SCDs LEFT leg. GI: Pepcid. 15.  and case management consultations for coordination of care and discharge planning.     Chronic Problems:  1. Diabetic polyneuropathy. 2. Hypertension. 1. No current medications. 3. Diabetic macular edema bilaterally. 4. Gastroesophageal reflux disease. 1. Pepcid. 5. Bipolar 2 disorder. 1. Sertraline. 2. Mirtazapine for sleep. 6. Non compliance with medical treatment.     Labs:  1. 4/23.     Infectious Disease:  1. Ceftriaxone.     Missed Therapy Time:  None     DME:    Discharge Plan:    Myles Hilliard MD

## 2019-04-24 NOTE — PLAN OF CARE
Problem: Pain:  Goal: Pain level will decrease  Description  Pain level will decrease  Outcome: Ongoing  Note:   Pain level fluctuates throughout the day. Pain medication taken as well as elevation of extremity has good effect. Problem: Pain:  Goal: Control of acute pain  Description  Control of acute pain  Outcome: Ongoing     Problem: Pain:  Goal: Control of chronic pain  Description  Control of chronic pain  Outcome: Ongoing     Problem: Falls - Risk of:  Goal: Will remain free from falls  Description  Will remain free from falls  Outcome: Ongoing  Note:   1 assist to stand pivot for transfers. Tolerates well. No c/o voiced. Problem: Falls - Risk of:  Goal: Absence of physical injury  Description  Absence of physical injury  Outcome: Ongoing     Problem: Risk for Impaired Skin Integrity  Goal: Tissue integrity - skin and mucous membranes  Description  Structural intactness and normal physiological function of skin and  mucous membranes. Outcome: Ongoing  Note:   Staples intact, no drainage noted. Problem: Infection - Surgical Site:  Goal: Will show no infection signs and symptoms  Description  Will show no infection signs and symptoms  Outcome: Ongoing  Note:   No s/sx infection noted at surgical site. Afebrile. ATB cont. Problem:  Bowel/Gastric:  Goal: Ability to achieve a regular elimination pattern will improve  Description  Ability to achieve a regular elimination pattern will improve  Outcome: Ongoing     Problem: Serum Glucose Level - Abnormal:  Goal: Ability to maintain appropriate glucose levels will improve  Description  Ability to maintain appropriate glucose levels will improve  Outcome: Ongoing     Problem: Mood - Altered:  Goal: Mood stable  Description  Mood stable  Outcome: Ongoing  Note:   Mood stable patient has flat affect

## 2019-04-24 NOTE — PROGRESS NOTES
[facility]  Recreational Therapy  Daily Note  [address]    Time Spent with Patient: 30 minutes    Date:  4/24/2019       Patient Name: Jordan Quiroz      MRN: [id number]      YOB: 1970 (44 y.o.)       Gender: male  Diagnosis: Above knee amputation of right lower extremity   Referring Practitioner: Dr. Justine Tanner     RESTRICTIONS/PRECAUTIONS:  Restrictions/Precautions: Fall Risk, Weight Bearing, General Precautions  Vision: Within Functional Limits  Hearing: Within functional limits    PAIN: 0-no c/o pain     SUBJECTIVE:  I feel like my blood sugar is low     OBJECTIVE:  Via w/c and MI pt came to the [employer] dining room to play [employer] with peers-affect brightened throughout games-at beginning of game pt c/o low blood sugar-tech came to check it and gave him some orange juice to drink-no other complaints during game-via w/c pt went back to his room with MI          Patient Education  New Education Provided: Importance of Leisure,     Electronically signed by: GERMANIA Guerrero  Date: 4/24/2019

## 2019-04-24 NOTE — PROGRESS NOTES
[address] [address]  Diagnosis List for Inpatient Rehab facility (IRF) - Patient Assessment Instrument (ARIANE)    Patient Name: Sho Antonio        MRN: [id number]    : 1970  (44 y.o.)  Gender: male     Primary impairment requiring rehabilitation: 5.3 Unilateral Lower Limb Above the Knee Amputation     Etiologic Diagnosis that led to the condition: Osteomyelitis, right stump, lower extremity. Comorbid conditions affecting rehabilitation:  1. S/p RIGHT above knee amputation for osteomyelitis of prior below knee amputation non healing wound by Dr. Hanna on 2019.  2. Gait instability. 3. S/p incision and drainage with saucerization of RIGHT tibia and fibula with poorly healing wound and osteomyelitis with Streptococcus agalactiae by Dr. Ashanti Westfall on 2019.  4. S/p RIGHT below knee amputation for osteomyelitis of the RIGHT foot on 2016.  5. *Type 2 DM, poorly controlled.  Last hemoglobin A1c was 9.1 on 2019.   6. Post operative blood loss anemia with initial Hgb of 11.7 on  down to 10.5 on .  7. Hyponatremia. 8. Leukocytosis. 9. *Diabetic polyneuropathy. 10. Hypertension. 11. *Diabetic macular edema bilaterally. 12. Gastroesophageal reflux disease. 13. Bipolar 2 disorder. 14. Non compliance with medical treatment. 15. History of marijuana abuse. 16. Hypovitaminosis D. 17. Lower limb edema.     Marvell Dakins, MD

## 2019-04-24 NOTE — PROGRESS NOTES
Bearing, General Precautions     Right Lower Extremity Weight Bearing: Non Weight Bearing     Other position/activity restrictions: right AKA      Prior Level of Function:  ADL Assistance: Independent  Homemaking Assistance: Independent  Ambulation Assistance: Independent(wheelchair)  Transfer Assistance: Independent  Additional Comments: Pt reporting he was on TCU back in Feb 2019. Pt stating he was indep with ADL tasks at home. His wheelchair will fit through all his doorways at home. Subjective  Subjective: Pt brighter affect today, very agreeable to a shower. Pain:  Pain Assessment  Patient Currently in Pain: Yes  Pain Level: 6  Pain Type: Surgical pain    Objective  Overall Cognitive Status: WFL    Transfers  Sit to stand: Stand by assistance  Stand to sit: Stand by assistance     Balance  Sitting Balance: Modified independent   Standing Balance: Stand by assistance(Close SBA )     Functional Mobility  Functional - Mobility Device: Wheelchair  Assist Level: Modified independent   Functional Mobility Comments: Within unit         Additional Activities: Pt completed dynamic standing task this date standing x2.5 min while requiring SBA for balance - task was graded to involve unilateral release from RW. Completed in order to challenge dynamic standing balance for ADL clothing mgmt     Exercises   Comment: Patient completed UE strengthening with a max resistive band x15 reps x1 set with mod rest breaks for bilateral UE strengthening HEP to icnerase endurance and strength for his ADL transfer such as to the BSC/toilet. Activity Tolerance:  Activity Tolerance: Patient Tolerated treatment well    Assessment:  Assessment: Pt progressing towards goals meeting 1/4 STGs already during short stay on IP rehab. Pt is motivated to regain strength and balance to return home. He requires rest breaks due to fatigue and deconditioning from hospital stay,. But has already shown improvements from initial evaluation. Nicholas Goldstein has a tendency to complete transfers his way, and would benefit from additional OT to improve safety and technique to ensure safety at home. Performance deficits / Impairments: Decreased ADL status, Decreased safe awareness, Decreased balance, Decreased endurance, Decreased strength  Prognosis: Good    Discharge Recommendations:  Discharge Recommendations: Home with assist PRN    Patient Education:  Patient Education: Shower transfers, home set up     Equipment Recommendations: Other: Pt has a tub transfer bench. Uses a standard toilet, but denies needs. Recommend grab bar near toilet instead of using towel bar. Safety:  Safety Devices in place: Yes  Type of devices: All fall risk precautions in place, Call light within reach, Left in chair    Plan:  Times per week: 5x/wk for 90 min and 1x/wk for 30 min    Current Treatment Recommendations: Patient/Caregiver Education & Training, Strengthening, Balance Training, Functional Mobility Training, Safety Education & Training, Self-Care / ADL, Endurance Training    Goals:  Patient goals : Improve my strength and become steadier     Short term goals  Time Frame for Short term goals: 1 week   Short term goal 1: Pt will tolerate standing 1 min with S while releasing 1 UE from walker to improve balance needed to manage clothing. Short term goal 2: Pt will complete sit pivot transfer using safe technique with SBA to improve safety when transferring to toilet. Short term goal 3: Pt will don shorts with Mod I to improve indep with getting dressed at home. Short term goal 4: Pt will preform IADL task from wheelchair level with mod I and no cues for safety to improve indep within home. Long term goals  Time Frame for Long term goals : 2 weeks   Long term goal 1: Pt will complete shower and dressing routine with mod I to return to prior level of independence. Long term goal 2: Pt will complete BUE HEP using handout with Mod I to improve UB strength to 5/5.

## 2019-04-24 NOTE — PROGRESS NOTES
[place] [id number],    Time In: 0700  Time Out: 0730  Timed Code Treatment Minutes: 30 Minutes  Minutes: 30          Date: 2019  Patient Name: Tanmay Cowart,  Gender:  male        MRN: [id number]  : 1970  (44 y.o.)     Referring Practitioner: LIDIA Hernandez MD  Diagnosis: above knee amputation of right lower extremity  Treatment Diagnosis: R AKA  Additional Pertinent Hx: HX:  BKA with wound over fibula. Underwent multiple debridements with no success. Pt admitted for AKA, performed 2019. To [facility] on      Past Medical History:   Diagnosis Date    Bipolar 2 disorder [facility])     previously followed with Dr. Alfred Combs and Yemi Johnson in [facility] Diabetes mellitus type 2, uncontrolled ([address]     HgbA1c on 2019 was 9.1.     Diabetic polyneuropathy ([address]     Diabetic ulcer of right foot associated with type 2 diabetes mellitus ([address] 12/10/2015    Essential hypertension     \"never been on b/p medication that I know of\"    GERD (gastroesophageal reflux disease)     Hammer toe of left foot     Heart murmur     denies any chest pain or palpitations    History of tobacco abuse     Hx of AKA (above knee amputation), right ([address] 2019    Dr. Lashanda Prater edema, diabetic, bilateral ([address] 2018    Dr. 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 ([address] 2016     Past Surgical History:   Procedure Laterality Date    ABSCESS DRAINAGE Right     foot    AMPUTATION ABOVE KNEE Right 2019    RIGHT ABOVE KNEE AMPUTATION performed by Sylvie Valdivia MD at [address] Right 2016    I & D    INCISION AND DRAINAGE Right 2019    I&D RIGHT STUMP performed by Sylvie Valdivia MD at [address] [facility] [facility] Right 07/20/2016    LEG AMPUTATION BELOW KNEE Right 4/4/2019    I&D AND REVISION OF AMPUTATION RIGHT LEG performed by Halle Hopper MD at [facility] [facility] Right 1/14/15    sole of foot I&D    NH DRAIN INFECT SHOULDER BURSA Left 8/18/2017    LEFT SHOULDER INCISION AND DRAINAGE performed by Halle Hopper MD at [address] OFFICE/OUTPT [facility] Right 9/20/2018    EXCISIONAL DEBRIDEMENT RIGHT BKA STUMP performed by Mark Guillen MD at [address] Right 1/16/15    2nd toe with wound vac applied    WISDOM TOOTH EXTRACTION  ? when       Restrictions/Precautions:  Fall Risk, Weight Bearing, General Precautions     Right Lower Extremity Weight Bearing: Non Weight Bearing              Other position/activity restrictions: right AKA        Prior Level of Function:  ADL Assistance: Independent  Homemaking Assistance: Independent  Ambulation Assistance: Independent(wheelchair)  Transfer Assistance: Independent  Additional Comments: Pt reporting he was on TCU back in Feb 2019. Pt stating he was indep with ADL tasks at home. His wheelchair will fit through all his doorways at home. Subjective:     Subjective: Pt. asleep in bed upon arrival and agreeable to therapy session. Pt. requires extra time to arouse. Pt. reports increased pain in stump and requests pain meds at beginning of session. Pain:  Yes.   Pain Assessment  Pain Assessment: 0-10  Pain Level: 10  Pain Type: Surgical pain  Pain Location: Leg  Pain Orientation: Right       Social/Functional:  Lives With: Other (comment)(Roommate)  Type of Home: House  Home Layout: Two level, Able to Live on Main level with bedroom/bathroom  Home Access: Ramped entrance  Home Equipment: Wheelchair-manual, Rolling walker(wheelchair not in good shape)     Objective:  Rolling to Right: Stand by assistance  Supine to Sit: Stand by assistance  Scooting: Stand by assistance    Transfers  Sit to Stand: Contact guard assistance  Stand to sit: Contact guard assistance  Stand Pivot Transfers: Contact guard assistance(With RW)            Propulsion 1  Propulsion: Manual  Level: Level Tile  Method: RUE;LUE;LLE  Level of Assistance: Modified independent  Description/ Details: good pace and safety awareness, Good manueverability in tight spaces this date  Distance: 200' x 2       Exercises:  Exercises  Comments: Seated B LE ther ex 10-15x each consisting of marches, hip abd/add AROM, glute sets, L LE ankle pumps, long arc quads, resisted hip abd/add, resisted HS curls, and standing heel raises all to increase strength and improve functional mobility. Activity Tolerance:  Activity Tolerance: Patient limited by pain; Patient Tolerated treatment well    Assessment: Body structures, Functions, Activity limitations: Decreased functional mobility , Decreased ADL status, Decreased strength, Decreased endurance, Decreased balance  Assessment: Pt. tolerated session fairly well. Pt. pleasant and cooperative throughout session but limited by pain. Pt. demos good technique and safety awareness with transfers this date. Pt. would benefit from continued skilled PT to increase strength and endurance to enhance functional mobility.    Prognosis: Good          Discharge Recommendations:  Discharge Recommendations: Continue to assess pending progress    Patient Education:  Patient Education: POC, transfers, ther ex    Equipment Recommendations:  Equipment Needed: Yes(w/c, has RW)    Safety:  Type of devices: Gait belt, All fall risk precautions in place, Positioning belt, Patient at risk for falls, Left in chair    Plan:  Times per week: 5x/wk 90 min, 1x/wk 30 min  Current Treatment Recommendations: Strengthening, Transfer Training, Wheelchair Mobility Training, Patient/Caregiver Education & Training, Functional Mobility Training, Safety Education & Training, Positioning, Gait Training, Balance Training, Endurance Training, ADL/Self-care Training, Stair training, Home Exercise Program, Equipment Evaluation, Education, & procurement    Goals:  Patient goals : go home    Short term goals  Time Frame for Short term goals: 1 week  Short term goal 1: Pt to perform supine to/from sit at mod I to get in and out of bed  Short term goal 2: Pt to perform sit to/from stand at S with proper hand placement to rise from bed or chair  Short term goal 3: Pt to perform stand pivot with RW at SBA to get to and from bed and wheelchair  Short term goal 4: Pt to ambulate 30ft with RW at SBA to walk to and from restroom  Short term goal 5: Pt to propel wheelchair community distances and up and down 25 ft ramp at mod I for functional mobility    Long term goals  Time Frame for Long term goals : 2 weeks  Long term goal 1: Pt to perform sit to/from stand at mod I with proper hand placement to rise from bed or chair  Long term goal 2: Pt to perform stand pivot with RW at mod I to get to and from bed and wheelchair  Long term goal 3: Pt to ambulate 50ft with RW at mod I to walk to and from restroom  Long term goal 4: Pt to negotiate car transfer at S for transport to and from medical appts  If patient is discharged prior to progress note completion, this note is to serve as the discharge note with all goals being unmet unless indicated otherwise.

## 2019-04-24 NOTE — PROGRESS NOTES
[facility] [place] [facility] right leg stump or not.

## 2019-04-24 NOTE — PROGRESS NOTES
[address]    Time In: 1130  Time Out: 1230  Timed Code Treatment Minutes: 60 Minutes  Minutes: 60          Date: 2019  Patient Name: Maykel Leger,  Gender:  male        MRN: [id number]  : 1970  (44 y.o.)     Referring Practitioner: LIDIA Gates MD  Diagnosis: above knee amputation of right lower extremity  Treatment Diagnosis: R AKA  Additional Pertinent Hx: HX:  BKA with wound over fibula. Underwent multiple debridements with no success. Pt admitted for AKA, performed 2019. To [facility] on      Past Medical History:   Diagnosis Date    Bipolar 2 disorder [facility])     previously followed with Dr. Chance Sharif and Pilar Cardona in \[facility] [place] [facility]\"" Diabetes mellitus type 2, uncontrolled ([address]     HgbA1c on 2019 was 9.1.     Diabetic polyneuropathy ([address]     Diabetic ulcer of right foot associated with type 2 diabetes mellitus ([address] 12/10/2015    Essential hypertension     \"never been on b/p medication that I know of\"    GERD (gastroesophageal reflux disease)     Hammer toe of left foot     Heart murmur     denies any chest pain or palpitations    History of tobacco abuse     Hx of AKA (above knee amputation), right ([address] 2019    Dr. Gloria Simpson edema, diabetic, bilateral ([address] 2018    Dr. 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 ([address] 2016     Past Surgical History:   Procedure Laterality Date    ABSCESS DRAINAGE Right     foot    AMPUTATION ABOVE KNEE Right 2019    RIGHT ABOVE KNEE AMPUTATION performed by Lizzette Arriaga MD at [address] Right 2016    I & D    INCISION AND DRAINAGE Right 2019    I&D RIGHT STUMP performed by Lizzette Arriaga MD at [facility] [facility] Right 07/20/2016    LEG AMPUTATION BELOW KNEE Right 4/4/2019    I&D AND REVISION OF AMPUTATION RIGHT LEG performed by Fiorella Cee MD at [address] Right 1/14/15    sole of foot I&D    MD DRAIN INFECT SHOULDER BURSA Left 8/18/2017    LEFT SHOULDER INCISION AND DRAINAGE performed by Fiorella Cee MD at [address] OFFICE/OUTPT [facility] Right 9/20/2018    EXCISIONAL DEBRIDEMENT RIGHT BKA STUMP performed by López Almonte MD at [address] Right 1/16/15    2nd toe with wound vac applied    WISDOM TOOTH EXTRACTION  ? when       Restrictions/Precautions:  Fall Risk, Weight Bearing, General Precautions     Right Lower Extremity Weight Bearing: Non Weight Bearing              Other position/activity restrictions: right AKA        Prior Level of Function:  ADL Assistance: Independent  Homemaking Assistance: Independent  Ambulation Assistance: Independent(wheelchair)  Transfer Assistance: Independent  Additional Comments: Pt reporting he was on TCU back in Feb 2019. Pt stating he was indep with ADL tasks at home. His wheelchair will fit through all his doorways at home. Subjective:     Subjective: Pt. seated in WC upon arrival and pleasantly agrees to therapy session. Pt. with c/o pain throughout session. Ice pack applied to R thigh at end of session for pain relief. Pain:  Yes. Pain Assessment  Pain Assessment: 0-10  Pain Level: 10  Pain Type: Acute pain  Pain Location: Leg  Pain Orientation: Right;Upper; Anterior  Pain Descriptors: Aching       Social/Functional:  Lives With: Other (comment)(Roommate)  Type of Home: House  Home Layout: Two level, Able to Live on Main level with bedroom/bathroom  Home Access: Ramped entrance  Home Equipment: Wheelchair-manual, Rolling walker(wheelchair not in good shape)     Objective:  Supine to Sit: Stand by assistance  Sit to Supine: Stand by assistance    Transfers  Sit to Stand: Contact guard assistance  Stand to stand pivot with RW at mod I to get to and from bed and wheelchair  Long term goal 3: Pt to ambulate 50ft with RW at mod I to walk to and from restroom  Long term goal 4: Pt to negotiate car transfer at S for transport to and from medical appts  If patient is discharged prior to progress note completion, this note is to serve as the discharge note with all goals being unmet unless indicated otherwise. Treatment session and note completed by Stephani Don PTA.   Assessment and goal revision of Progress Note completed by Adrian Carrero PT.

## 2019-04-24 NOTE — PROGRESS NOTES
Medical History:   Diagnosis Date    Bipolar 2 disorder [facility])     previously followed with Dr. Rica Sharp and Silver Hernandez in [facility] Diabetes mellitus type 2, uncontrolled ([address]     HgbA1c on 4/2/2019 was 9.1.  Diabetic polyneuropathy ([address]     Diabetic ulcer of right foot associated with type 2 diabetes mellitus ([address] 12/10/2015    Essential hypertension     \"never been on b/p medication that I know of\"    GERD (gastroesophageal reflux disease)     Hammer toe of left foot     Heart murmur     denies any chest pain or palpitations    History of tobacco abuse     Hx of AKA (above knee amputation), right ([address] 04/18/2019    Dr. Dee Garnett edema, diabetic, bilateral ([address] 05/04/2018    Dr. Keerthi Rodriguez referred to retina specialist for 2nd opinion    Marijuana abuse in remission     Onychomycosis     WD-Skin ulcer of fourth toe of right foot with necrosis of bone ([address] 6/29/2016     Past Surgical History:   Procedure Laterality Date    ABSCESS DRAINAGE Right     foot    AMPUTATION ABOVE KNEE Right 4/18/2019    RIGHT ABOVE KNEE AMPUTATION performed by Tania Khan MD at [facility] Right 07/01/2016    I & D    INCISION AND DRAINAGE Right 2/18/2019    I&D RIGHT STUMP performed by Tania Khan MD at [address] Right 07/20/2016    LEG AMPUTATION BELOW KNEE Right 4/4/2019    I&D AND REVISION OF AMPUTATION RIGHT LEG performed by Tania Khan MD at [place] [address] Right 1/14/15    sole of foot I&D    [facility] DRAIN INFECT SHOULDER BURSA Left 8/18/2017    LEFT SHOULDER INCISION AND DRAINAGE performed by Tania Khan MD at [address] OFFICE/OUTPT [address] [facility] Right 9/20/2018    EXCISIONAL DEBRIDEMENT RIGHT BKA STUMP performed by Lynda Valera MD at [address] [place] [address] Drive Right 1/16/15    2nd toe with wound vac applied    WISDOM TOOTH EXTRACTION  ? when       Restrictions/Precautions:  Fall Risk, Weight Bearing, General Precautions     Right Lower Extremity Weight Bearing: Non Weight Bearing              Other position/activity restrictions: right AKA        Prior Level of Function:  ADL Assistance: Independent  Homemaking Assistance: Independent  Ambulation Assistance: Independent(wheelchair)  Transfer Assistance: Independent  Additional Comments: Pt reporting he was on TCU back in 2019. Pt stating he was indep with ADL tasks at home. His wheelchair will fit through all his doorways at home. Subjective  Subjective: Pt brighter affect today, very agreeable to a shower. Pain:  Pain Assessment  Patient Currently in Pain: Yes  Pain Level: 6  Pain Type: Surgical pain       Objective  Overall Cognitive Status: WFL    Transfers  Stand Pivot Transfers: Contact guard assistance(Squat pivot transfer )  Sit pivot transfer: sba   Sit to stand: Stand by assistance  Stand to sit: Stand by assistance   Pt declined use of walker to stand, stating he holds onto w/c at home. Completed like home set up while pt released 1 UE from w/c to manage pants        Balance  Sitting Balance: Modified independent   Standing Balance: Stand by assistance(Close SBA )     Time: x 30 sec x 2 trials   Activity: clothing management     Functional Mobility  Functional - Mobility Device: Wheelchair  Activity: Retrieve items;Transport items  Assist Level: Modified independent   Functional Mobility Comments:  To and from shower                                                                                    Groomin - Requires setup/cues to do all tasks(seated to shave face, set up to wash face in shower )                                                                                Bathin - Able to bathe all 10 areas with setup/sup/cues                                                                              Dressing-Upper: 4 - Requires assist with buttons/zippers only and/or requires assist with one arm only(Pt asked for assist to pull shirt down in back )                                                                               Dressing-Lower: 3 - Requires assist with 2-3 parts of dressing(Pt asked for help to pull down shirt, A with doff and razia sock and shoe.    Pt able to pull pants up while standing with close SBA )                                                                                                                                                                                                                                       Tub Transfer: 4 - Minimal contact assistance, pt. expends 75% or more effort(CGA squat pivot to and from shower bench )                                                                                                                                                                                                                                    OT FIM ASSESSMENT:     Admission score Current score Goal Goal Status   EATING 7 - Patient feeds self 7 - Patient feeds self 7 Met and Continue   GROOMING 5 - Requires setup/cues to do all tasks(set up to use mouthwash ) 5 - Requires setup/cues to do all tasks(seated to shave face, set up to wash face in shower ) 7 Not Met and Continue   BATHING 4 - Able to bathe 8-9 areas(CGA while standing to wash bottom, A to wash L foot ) 5 - Able to bathe all 10 areas with setup/sup/cues 6 Not Met and Continue   UPPER EXTREMITY DRESSING 5 - Requires setup/supervision/cues and/or requires assist with presthesis/brace only(SBA to don shirt seated in wheelchair ) 4 - Requires assist with buttons/zippers only and/or requires assist with one arm only(Pt asked for assist to pull shirt down in back ) 7 Not Met and Continue   LOWER EXTREMITY DRESSING 3 - Requires assist with 2-3 parts of dressing(A to thread clothing, A with L shoe, A to pull up pants ) 3 - Requires assist with 2-3 parts of dressing(Pt asked for help to pull down shirt, A with doff and razia sock and shoe.   Pt able to pull pants up while standing with close SBA ) 6 Not Met and Continue   TOILETING 5 - Requires setup/supervision/cues(Pt used urinal with set up ) 5 - Requires setup/supervision/cues(Pt used urinal with set up ) 6 Not Met and Continue   TOILET TRANSFER 4 - Requires steadying assistance only < 25% assist(CGA sit pivot to and from standard toilet using grab bar (pt does not have one at home) ) 4 - Requires steadying assistance only < 25% assist(CGA sit pivot to and from standard toilet using grab bar (pt does not have one at home) ) 6 Not Met and Continue   TUB/SHOWER TRANSFER 0 - Activity does not occur         4 - Minimal contact assistance, pt. expends 75% or more effort(CGA squat pivot to and from shower bench )         6 Not Met and Continue       Activity Tolerance:  Activity Tolerance: Patient Tolerated treatment well    Assessment:  Pt progressing towards goals meeting 1/4 STGs already during short stay on IP rehab. Pt is motivated to regain strength and balance to return home. He requires rest breaks due to fatigue and deconditioning from hospital stay,. But has already shown improvements from initial evaluation. Zeeshan Onofre has a tendency to complete transfers his way, and would benefit from additional OT to improve safety and technique to ensure safety at home. Performance deficits / Impairments: Decreased ADL status, Decreased safe awareness, Decreased balance, Decreased endurance, Decreased strength  Prognosis: Good    Discharge Recommendations:  Discharge Recommendations: Home with assist PRN    Patient Education:  Patient Education: Shower transfers, home set up     Equipment Recommendations: Other: Pt has a tub transfer bench. Uses a standard toilet, but denies needs. Recommend grab bar near toilet instead of using towel bar. Safety:  Safety Devices in place: Yes  Type of devices:  All fall risk precautions in place, Call light within reach, Left in chair    Plan:  Times per week: 5x/wk for 90 min and 1x/wk for 30 min    Current Treatment Recommendations: Patient/Caregiver Education & Training, Strengthening, Balance Training, Functional Mobility Training, Safety Education & Training, Self-Care / ADL, Endurance Training    Goals:  Patient goals : Improve my strength and become steadier     Short term goals  Time Frame for Short term goals: 1 week   Short term goal 1: Pt will tolerate standing 1 min while releasing 1 UE from walker to improve balance needed to manage clothing. NOT MET, CONTINUE  Short term goal 2: Pt will complete sit pivot transfer using safe technique with SBA to improve safety when transferring to toilet. MET, REVISE  Short term goal 3: Pt will complete LB dressing tasks with supervision to improve indep with self care at home. NOT MET, REVISE   Short term goal 4: Pt will preform IADL task from wheelchair level with mod I and no cues for safety to improve indep within home. NOT MET, CONTINUE  Long term goals  Time Frame for Long term goals : 2 weeks   Long term goal 1: Pt will complete shower and dressing routine with mod I to return to prior level of independence. NOT MET CONTNIUE  Long term goal 2: Pt will complete BUE HEP using handout with Mod I to improve UB strength to 5/5. NOT MET, CONTINUE   If patient is discharged prior to progress note completion, this note is to serve as the discharge note with all goals being unmet unless indicated otherwise. Revised Short-Term Goals  Short term goals  Time Frame for Short term goals: 1 week   Short term goal 1: Pt will tolerate standing 1 min with S while releasing 1 UE from walker to improve balance needed to manage clothing. Short term goal 2: Pt will complete sit pivot transfer using safe technique with SBA to improve safety when transferring to toilet. Short term goal 3: Pt will don shorts with Mod I to improve indep with getting dressed at home.    Short term goal 4: Pt will preform IADL task from wheelchair level with mod I and no cues for safety to improve indep within home. Long term goals  Time Frame for Long term goals : 2 weeks   Long term goal 1: Pt will complete shower and dressing routine with mod I to return to prior level of independence. Long term goal 2: Pt will complete BUE HEP using handout with Mod I to improve UB strength to 5/5.

## 2019-04-25 LAB
GLUCOSE BLD-MCNC: 101 MG/DL (ref 70–108)
GLUCOSE BLD-MCNC: 103 MG/DL (ref 70–108)
GLUCOSE BLD-MCNC: 119 MG/DL (ref 70–108)
GLUCOSE BLD-MCNC: 125 MG/DL (ref 70–108)
GLUCOSE BLD-MCNC: 145 MG/DL (ref 70–108)

## 2019-04-25 PROCEDURE — 6360000002 HC RX W HCPCS: Performed by: INTERNAL MEDICINE

## 2019-04-25 PROCEDURE — 6370000000 HC RX 637 (ALT 250 FOR IP): Performed by: ORTHOPAEDIC SURGERY

## 2019-04-25 PROCEDURE — 97110 THERAPEUTIC EXERCISES: CPT

## 2019-04-25 PROCEDURE — 97530 THERAPEUTIC ACTIVITIES: CPT

## 2019-04-25 PROCEDURE — 2580000003 HC RX 258: Performed by: INTERNAL MEDICINE

## 2019-04-25 PROCEDURE — 97535 SELF CARE MNGMENT TRAINING: CPT

## 2019-04-25 PROCEDURE — 6370000000 HC RX 637 (ALT 250 FOR IP): Performed by: PHYSICAL MEDICINE & REHABILITATION

## 2019-04-25 PROCEDURE — 1180000000 HC REHAB R&B

## 2019-04-25 PROCEDURE — 97542 WHEELCHAIR MNGMENT TRAINING: CPT

## 2019-04-25 PROCEDURE — 82948 REAGENT STRIP/BLOOD GLUCOSE: CPT

## 2019-04-25 PROCEDURE — 97116 GAIT TRAINING THERAPY: CPT

## 2019-04-25 PROCEDURE — 2709999900 HC NON-CHARGEABLE SUPPLY

## 2019-04-25 RX ADMIN — FAMOTIDINE 20 MG: 20 TABLET ORAL at 08:21

## 2019-04-25 RX ADMIN — MIRTAZAPINE 15 MG: 15 TABLET, FILM COATED ORAL at 22:16

## 2019-04-25 RX ADMIN — VITAMIN D, TAB 1000IU (100/BT) 5000 UNITS: 25 TAB at 08:21

## 2019-04-25 RX ADMIN — VITAMIN D, TAB 1000IU (100/BT) 5000 UNITS: 25 TAB at 22:16

## 2019-04-25 RX ADMIN — OXYCODONE AND ACETAMINOPHEN 2 TABLET: 5; 325 TABLET ORAL at 13:00

## 2019-04-25 RX ADMIN — CYCLOBENZAPRINE HYDROCHLORIDE 10 MG: 10 TABLET, FILM COATED ORAL at 22:16

## 2019-04-25 RX ADMIN — FUROSEMIDE 20 MG: 20 TABLET ORAL at 08:21

## 2019-04-25 RX ADMIN — INSULIN LISPRO 18 UNITS: 100 INJECTION, SOLUTION INTRAVENOUS; SUBCUTANEOUS at 13:07

## 2019-04-25 RX ADMIN — OXYCODONE AND ACETAMINOPHEN 2 TABLET: 5; 325 TABLET ORAL at 22:16

## 2019-04-25 RX ADMIN — CEFTRIAXONE SODIUM 2 G: 2 INJECTION, POWDER, FOR SOLUTION INTRAMUSCULAR; INTRAVENOUS at 16:57

## 2019-04-25 RX ADMIN — SERTRALINE 150 MG: 100 TABLET, FILM COATED ORAL at 08:21

## 2019-04-25 RX ADMIN — POTASSIUM CHLORIDE 20 MEQ: 20 TABLET, EXTENDED RELEASE ORAL at 08:21

## 2019-04-25 RX ADMIN — INSULIN GLARGINE 55 UNITS: 100 INJECTION, SOLUTION SUBCUTANEOUS at 22:18

## 2019-04-25 RX ADMIN — OXYCODONE AND ACETAMINOPHEN 2 TABLET: 5; 325 TABLET ORAL at 08:21

## 2019-04-25 ASSESSMENT — PAIN SCALES - GENERAL
PAINLEVEL_OUTOF10: 10
PAINLEVEL_OUTOF10: 10
PAINLEVEL_OUTOF10: 6
PAINLEVEL_OUTOF10: 0
PAINLEVEL_OUTOF10: 7
PAINLEVEL_OUTOF10: 10
PAINLEVEL_OUTOF10: 10
PAINLEVEL_OUTOF10: 7

## 2019-04-25 ASSESSMENT — PAIN DESCRIPTION - LOCATION
LOCATION: LEG
LOCATION: LEG

## 2019-04-25 ASSESSMENT — PAIN DESCRIPTION - PAIN TYPE: TYPE: ACUTE PAIN

## 2019-04-25 ASSESSMENT — PAIN DESCRIPTION - ORIENTATION: ORIENTATION: RIGHT;ANTERIOR

## 2019-04-25 NOTE — PROGRESS NOTES
History:   Diagnosis Date    Bipolar 2 disorder ([facility]     previously followed with Dr. Gabbie Pride and Liliana See in [place] Diabetes mellitus type 2, uncontrolled ([facility]     HgbA1c on 4/2/2019 was 9.1.  Diabetic polyneuropathy ([facility]     Diabetic ulcer of right foot associated with type 2 diabetes mellitus ([facility] 12/10/2015    Essential hypertension     \"never been on b/p medication that I know of\"    GERD (gastroesophageal reflux disease)     Hammer toe of left foot     Heart murmur     denies any chest pain or palpitations    History of tobacco abuse     Hx of AKA (above knee amputation), right ([facility] 04/18/2019    Dr. Espino Leak edema, diabetic, [facility] 05/04/2018    Dr. 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 ([facility] 6/29/2016     Past Surgical History:   Procedure Laterality Date    ABSCESS DRAINAGE Right     foot    AMPUTATION ABOVE KNEE Right 4/18/2019    RIGHT ABOVE KNEE AMPUTATION performed by Tim Matthews MD at [address] Right 07/01/2016    I & D    INCISION AND DRAINAGE Right 2/18/2019    I&D RIGHT STUMP performed by Tim Matthews MD at [address] [facility] Right 07/20/2016    LEG AMPUTATION BELOW KNEE Right 4/4/2019    I&D AND REVISION OF AMPUTATION RIGHT LEG performed by Tim Matthews MD at [facility] Right 1/14/15    sole of foot I&D    OR DRAIN INFECT SHOULDER BURSA Left 8/18/2017    LEFT SHOULDER INCISION AND DRAINAGE performed by Tim Matthews MD at [facility] OFFICE/OUTPT [facility] Right 9/20/2018    EXCISIONAL DEBRIDEMENT RIGHT BKA STUMP performed by Angus Veliz MD at [address] [place] [address] Drive Right 1/16/15    2nd toe with wound vac applied    WISDOM TOOTH EXTRACTION  ? when       Restrictions/Precautions:  Fall Risk, Weight setup/supervision/cues and/or staff applies TEDS/prosthesis/brace only                                                                               Toiletin - Requires setup/supervision/cues                                                                               Toilet Transfer: 5 - Requires setup/supervision/cues                                                                            Activity Tolerance:  Activity Tolerance: Patient Tolerated treatment well    Assessment:  Performance deficits / Impairments: Decreased ADL status, Decreased safe awareness, Decreased balance, Decreased endurance, Decreased strength  Prognosis: Good    Discharge Recommendations:  Discharge Recommendations: Home with assist PRN    Patient Education:  Patient Education: pivots, dressing, IADL    Equipment Recommendations: Other: Pt has a tub transfer bench. Uses a standard toilet, but denies needs. Recommend grab bar near toilet instead of using towel bar. Safety:  Safety Devices in place: Yes  Type of devices: All fall risk precautions in place, Call light within reach, Left in chair    Plan:  Times per week: 5x/wk for 90 min and 1x/wk for 30 min    Current Treatment Recommendations: Patient/Caregiver Education & Training, Strengthening, Balance Training, Functional Mobility Training, Safety Education & Training, Self-Care / ADL, Endurance Training    Goals:  Patient goals : Improve my strength and become steadier     Short term goals  Time Frame for Short term goals: 1 week   Short term goal 1: Pt will tolerate standing 1 min with S while releasing 1 UE from walker to improve balance needed to manage clothing. Short term goal 2: Pt will complete sit pivot transfer using safe technique with SBA to improve safety when transferring to toilet. Short term goal 3: Pt will don shorts with Mod I to improve indep with getting dressed at home.    Short term goal 4: Pt will preform IADL task from wheelchair level with mod

## 2019-04-25 NOTE — PLAN OF CARE
Problem: Pain:  Description  Pain management should include both nonpharmacologic and pharmacologic interventions. Goal: Pain level will decrease  Description  Pain level will decrease  4/25/2019 1809 by Silver Rubalcava RN  Outcome: Ongoing  4/25/2019 1347 by Silver Rubalcava RN  Outcome: Ongoing  4/25/2019 1347 by Debbie Bhardwaj RN  Outcome: Ongoing  Goal: Control of acute pain  Description  Control of acute pain  4/25/2019 1809 by Silver Rubalcava RN  Outcome: Ongoing  4/25/2019 1347 by Silver Rubalcava RN  Outcome: Ongoing  4/25/2019 1347 by Debbie Bhardwaj RN  Outcome: Ongoing  Goal: Control of chronic pain  Description  Control of chronic pain  4/25/2019 1809 by Silver Rubalcava RN  Outcome: Ongoing  4/25/2019 1347 by Silver Rubalcava RN  Outcome: Ongoing  4/25/2019 1347 by Debbie Bhardwaj RN  Outcome: Ongoing     Problem: Falls - Risk of:  Goal: Will remain free from falls  Description  Will remain free from falls  4/25/2019 1809 by Silver Rubalcava RN  Outcome: Ongoing  4/25/2019 1347 by Silver Rubalcava RN  Outcome: Ongoing  4/25/2019 1347 by Debbie Bhardwaj RN  Outcome: Ongoing  Goal: Absence of physical injury  Description  Absence of physical injury  4/25/2019 1809 by Silver Rubalcava RN  Outcome: Ongoing  4/25/2019 1347 by Debbie Bhardwaj RN  Outcome: Ongoing  4/25/2019 1347 by Debbie Bhardwaj RN  Outcome: Ongoing     Problem: Risk for Impaired Skin Integrity  Goal: Tissue integrity - skin and mucous membranes  Description  Structural intactness and normal physiological function of skin and  mucous membranes.   4/25/2019 1809 by Silver Rubalcava RN  Outcome: Ongoing  4/25/2019 1347 by Silver Rubalcava RN  Outcome: Ongoing  4/25/2019 1347 by Debbie Bhardwaj RN  Outcome: Ongoing     Problem: Infection - Surgical Site:  Goal: Will show no infection signs and symptoms  Description  Will show no infection signs and symptoms  4/25/2019 1809 by Rachel Hope RN  Outcome: Ongoing  4/25/2019 1347 by Dolores Reyna RN  Outcome: Ongoing  4/25/2019 1347 by Myrna Bhardwaj RN  Outcome: Ongoing     Problem: Bowel/Gastric:  Goal: Ability to achieve a regular elimination pattern will improve  Description  Ability to achieve a regular elimination pattern will improve  4/25/2019 1809 by Dolores Reyna RN  Outcome: Ongoing  4/25/2019 1347 by Myrna Bhardwaj RN  Outcome: Ongoing  4/25/2019 1347 by Myrna Bhardwaj RN  Outcome: Ongoing     Problem: Nutritional:  Goal: Ability to follow a diet with enough fiber (20 to 30 grams) for normal bowel function will improve  Description  Ability to follow a diet with enough fiber (20 to 30 grams) for normal bowel function will improve  4/25/2019 1809 by Dolores Reyna RN  Outcome: Ongoing  4/25/2019 1347 by Myrna Bhardwaj RN  Outcome: Ongoing  4/25/2019 1347 by Myrna Bhardwaj RN  Outcome: Ongoing     Problem: Serum Glucose Level - Abnormal:  Goal: Ability to maintain appropriate glucose levels will improve  Description  Ability to maintain appropriate glucose levels will improve  4/25/2019 1809 by Dolores Reyna RN  Outcome: Ongoing  4/25/2019 1347 by Myrna Bhardwaj RN  Outcome: Ongoing  4/25/2019 1347 by Myrna Bhardwaj RN  Outcome: Ongoing     Problem: Mood - Altered:  Goal: Mood stable  Description  Mood stable  4/25/2019 1809 by Dolores Reyna RN  Outcome: Ongoing  4/25/2019 1347 by Myrna Bhardwaj RN  Outcome: Ongoing  4/25/2019 1347 by Myrna Bhardwaj RN  Outcome: Ongoing     Problem: Nutrition  Goal: Optimal nutrition therapy  4/25/2019 1809 by Dolores Reyna RN  Outcome: Ongoing  4/25/2019 1347 by Myrna Bhardwaj RN  Outcome: Ongoing  4/25/2019 1347 by Myrna Bhardwaj RN  Outcome: Ongoing     Care plan reviewed with patient. Patient verbalizes understanding of the plan of care and contribute to goal setting.

## 2019-04-25 NOTE — PROGRESS NOTES
[facility]    Time In: 1050  Time Out: 1150  Timed Code Treatment Minutes: 60 Minutes  Minutes: 60          Date: 2019  Patient Name: Sho Antonio,  Gender:  male        MRN: [id number]  : 1970  (44 y.o.)     Referring Practitioner: LIDIA Stoelo MD  Diagnosis: above knee amputation of right lower extremity  Treatment Diagnosis: R AKA  Additional Pertinent Hx: HX:  BKA with wound over fibula. Underwent multiple debridements with no success. Pt admitted for AKA, performed 2019. To [facility] on      Past Medical History:   Diagnosis Date    Bipolar 2 disorder [facility])     previously followed with Dr. Estrada Mcdaniel and Anders Najera in [facility] Diabetes mellitus type 2, uncontrolled ([address]     HgbA1c on 2019 was 9.1.     Diabetic polyneuropathy ([address]     Diabetic ulcer of right foot associated with type 2 diabetes mellitus ([address] 12/10/2015    Essential hypertension     \"never been on b/p medication that I know of\"    GERD (gastroesophageal reflux disease)     Hammer toe of left foot     Heart murmur     denies any chest pain or palpitations    History of tobacco abuse     Hx of AKA (above knee amputation), right ([address] 2019    Dr. Carmela Davis edema, diabetic, bilateral ([address] 2018    Dr. 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 ([address] 2016     Past Surgical History:   Procedure Laterality Date    ABSCESS DRAINAGE Right     foot    AMPUTATION ABOVE KNEE Right 2019    RIGHT ABOVE KNEE AMPUTATION performed by Wong Eason MD at [address] Right 2016    I & D    INCISION AND DRAINAGE Right 2019    I&D RIGHT STUMP performed by Wong Eason MD at [address] [place] [address],[address] Right 07/20/2016    LEG AMPUTATION BELOW KNEE Right 4/4/2019    I&D AND REVISION OF AMPUTATION RIGHT LEG performed by Caprice Kenny MD at [address] Right 1/14/15    sole of foot I&D    [place] DRAIN INFECT SHOULDER BURSA Left 8/18/2017    LEFT SHOULDER INCISION AND DRAINAGE performed by Caprice Kenny MD at [facility] OFFICE/OUTPT [address] Right 9/20/2018    EXCISIONAL DEBRIDEMENT RIGHT BKA STUMP performed by Rafaela Osler, MD at [address] Right 1/16/15    2nd toe with wound vac applied    WISDOM TOOTH EXTRACTION  ? when       Restrictions/Precautions:  Fall Risk, Weight Bearing, General Precautions     Right Lower Extremity Weight Bearing: Non Weight Bearing              Other position/activity restrictions: right AKA        Prior Level of Function:  ADL Assistance: Independent  Homemaking Assistance: Independent  Ambulation Assistance: Independent(wheelchair)  Transfer Assistance: Independent  Additional Comments: Pt reporting he was on TCU back in Feb 2019. Pt stating he was indep with ADL tasks at home. His wheelchair will fit through all his doorways at home. Subjective:     Subjective: Pt. seated in WC upon arrival and pleasantly agrees to therapy session. Pt. motivated but continues to be limited by R LE pain. Pain:  Yes. Pain Assessment  Pain Assessment: Faces  Pain Level: (not rated)  Pain Type: Acute pain  Pain Location: Leg  Pain Orientation: Right; Anterior       Social/Functional:  Lives With: Other (comment)(Roommate)  Type of Home: House  Home Layout: Two level, Able to Live on Main level with bedroom/bathroom  Home Access: Ramped entrance  Home Equipment: Wheelchair-manual, Rolling walker(wheelchair not in good shape)     Objective:  Rolling to Right: Stand by assistance  Supine to Sit: Minimal assistance  Sit to Supine: Stand by assistance  Scooting: Stand by assistance    Transfers  Sit to Stand: Contact guard assistance  Stand to sit: Contact guard assistance       Ambulation 1  Surface: level tile  Device: Rolling Walker  Assistance: Contact guard assistance  Quality of Gait: Pt. slightly unsteady but with no LOB. Decreased hop length and height. Distance: Multiple short distances. Balance  Comments: Pt. stood at [facility] while completing dynamic balance activity reaching outside [place] for bean bags and tossing them to target. Pt. slightly unsteady and fatigues easily requiring rest breaks between bouts. Pt. completed 4x for total of approximately 10' standing time. Wheelchair Activities  Propulsion: Yes  Propulsion 1  Propulsion: Manual  Level: Ramp  Method: RUE;LUE;LLE  Level of Assistance: Modified independent  Description/ Details: Good speed and manueverability. Distance: 150' x 1, 225' x 1         Exercises:  Exercises  Comments: Supine L LE heel slides, quad sets, ankle pumps, B LE hip abd/add, glute sets, and straight leg raises all completed 10x each. Seated B LE marches, hip abd/add, L LE ankle pumps, long arc quads, resisted HS curls, and resisted hip abd/add all completed 15x each all to increase strength and improve functional mobility. Positional stretch laying supine with R LE in neutral postion 4x60\" also completed to decrease risk of contracture. Activity Tolerance:  Activity Tolerance: Patient limited by pain; Patient Tolerated treatment well    Assessment: Body structures, Functions, Activity limitations: Decreased functional mobility , Decreased ADL status, Decreased strength, Decreased endurance, Decreased balance  Assessment: Pt. tolerated session fairly well. Pt. pleasant and cooperative throughout session but limited by pain. Pt. demos decreased standing tolerance and fatigues quickly. Pt. would benefit from continued skilled PT to enhance functional mobility. Prognosis: Good  The pt has met 0 STGs during his short stay on IPR.  The pt's mobility and safety awareness continue to improve during his stay. He is limited by weakness, decreased balance, and poor tolerance to activity at this time. At this time he requires SBA for bed mobility and CGA for sit to/from stand and ambulation for short distances up to 25ft prior to fatigue. Pt mod I for wheelchair mobility on flat surface and CGA for ramp negotiation in wheelchair. He continues to require skilled PT services to maximize independence and mobility prior to returning home.         Discharge Recommendations:  Discharge Recommendations: Continue to assess pending progress    Patient Education:  Patient Education: POC, therex, transfers, gait    Equipment Recommendations:  Equipment Needed: Yes(w/c, has RW)    Safety:  Type of devices: Gait belt, All fall risk precautions in place, Positioning belt, Patient at risk for falls, Left in chair    Plan:  Times per week: 5x/wk 90 min, 1x/wk 30 min  Current Treatment Recommendations: Strengthening, Transfer Training, Wheelchair Mobility Training, Patient/Caregiver Education & Training, Functional Mobility Training, Safety Education & Training, Positioning, Gait Training, Balance Training, Endurance Training, ADL/Self-care Training, Stair training, Home Exercise Program, Equipment Evaluation, Education, & procurement    Goals:  Patient goals : go home    Short term goals  Time Frame for Short term goals: 1 week  Short term goal 1: Pt to perform supine to/from sit at mod I to get in and out of bed  Short term goal 2: Pt to perform sit to/from stand at S with proper hand placement to rise from bed or chair  Short term goal 3: Pt to perform stand pivot with RW at SBA to get to and from bed and wheelchair  Short term goal 4: Pt to ambulate 30ft with RW at SBA to walk to and from restroom  Short term goal 5: Pt to propel wheelchair community distances and up and down 25 ft ramp at mod I for functional mobility    Long term goals  Time Frame for Long term goals : 2 weeks  Long term goal 1: Pt to perform sit to/from stand at mod I with proper hand placement to rise from bed or chair  Long term goal 2: Pt to perform stand pivot with RW at mod I to get to and from bed and wheelchair  Long term goal 3: Pt to ambulate 50ft with RW at mod I to walk to and from restroom  Long term goal 4: Pt to negotiate car transfer at S for transport to and from medical appts  If patient is discharged prior to progress note completion, this note is to serve as the discharge note with all goals being unmet unless indicated otherwise.

## 2019-04-25 NOTE — PROGRESS NOTES
[place] [address]  INPATIENT OCCUPATIONAL THERAPY  - [address] [facility],[address]  DAILY NOTE    Time:  Time In: 1430  Time Out: 1500  Timed Code Treatment Minutes: 30 Minutes  Minutes: 30    Date: 2019  Patient Name: Talon Owens,   Gender: male      Room: [facility]68/068-A  MRN: [id number]  : 1970  (44 y.o.)  Referring Practitioner: Dr. Annita Wooten   Diagnosis: Above knee amputation of right lower extremity   Additional Pertinent Hx: 44 y.o. 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 He was admitted 2019 for for complaint of pain secondary to a wound in his prior below knee amputation. He originally had a RIGHT below-knee amputation for persistent osteomyelitis in his RIGHT foot on 2016 and subsequently developed a poorly healing wound and osteomyelitis with Streptococcus agalactiae. On 2019 he underwent incision and drainage with saucerization of the RIGHT tibia and fibula by Dr. Marcia Gonsalves and a wound VAC was placed and he was discharged to home with home health with IV Rocephin. He had low-grade temperatures and leukocytosis noted at home by the home health nurse and he was referred to come back into the emergency department for evaluation. On 2019 he was agreeable to having a RIGHT above-knee amputation by Dr. Marcia Gonsalves. The patient is a poorly controlled type II diabetic and has not had controlled hemoglobin A1c values at any time from  2 .     Past Medical History:   Diagnosis Date    Bipolar 2 disorder [facility]     previously followed with Dr. Estrada Mcdaniel and Anders Najera in [facility] Diabetes mellitus type 2, uncontrolled ([address]     HgbA1c on 4/2/2019 was 9.1.  Diabetic polyneuropathy ([address]     Diabetic ulcer of right foot associated with type 2 diabetes mellitus ([address] 12/10/2015    Essential hypertension     \"never been on b/p medication that I know of\"    GERD (gastroesophageal reflux disease)     Hammer toe of left foot     Heart murmur     denies any chest pain or palpitations    History of tobacco abuse     Hx of AKA (above knee amputation), right ([address] 04/18/2019    Dr. Carmela Davis edema, diabetic, bilateral ([facility] 05/04/2018    Dr. 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 ([facility] 6/29/2016     Past Surgical History:   Procedure Laterality Date    ABSCESS DRAINAGE Right     foot    AMPUTATION ABOVE KNEE Right 4/18/2019    RIGHT ABOVE KNEE AMPUTATION performed by Wong Eason MD at [place] [address] Right 07/01/2016    I & D    INCISION AND DRAINAGE Right 2/18/2019    I&D RIGHT STUMP performed by Wong Eason MD at [address] [place] [address],[address] Right 07/20/2016    LEG AMPUTATION BELOW KNEE Right 4/4/2019    I&D AND REVISION OF AMPUTATION RIGHT LEG performed by Wong Eason MD at [place] [address] Right 1/14/15    sole of foot I&D    [place] DRAIN INFECT SHOULDER BURSA Left 8/18/2017    LEFT SHOULDER INCISION AND DRAINAGE performed by Wong Eason MD at [address] [facility] OFFICE/OUTPT [address] [facility] Right 9/20/2018    EXCISIONAL DEBRIDEMENT RIGHT BKA STUMP performed by Kwadwo Diaz MD at [address] [place] [address] Drive Right 1/16/15    2nd toe with wound vac applied    WISDOM TOOTH EXTRACTION  ? when       Restrictions/Precautions:  Fall Risk, Weight

## 2019-04-25 NOTE — PROGRESS NOTES
[address] [place] [address]  Recreational Therapy  Daily Note  [address]    Time Spent with Patient: 90 minutes    Date:  4/25/2019       Patient Name: Fawad Morgan      MRN: [id number]      YOB: 1970 (44 y.o.)       Gender: male  Diagnosis: Above knee amputation of right lower extremity   Referring Practitioner: Dr. Chapis Quintanilla     RESTRICTIONS/PRECAUTIONS:  Restrictions/Precautions: Fall Risk, Weight Bearing, General Precautions  Vision: Within Functional Limits  Hearing: Within functional limits    PAIN: 0-no c/o pain     SUBJECTIVE:  Can we play cards this afternoon     OBJECTIVE:  Pt propelled w/c to [employer] with MI to play cards with peers-affect bright and social-enjoyed getting out of his room and meeting peers-appreciative of activity         Patient Education  New Education Provided: Importance of Leisure,     Electronically signed by: GERMANIA Merrill  Date: 4/25/2019

## 2019-04-25 NOTE — PROGRESS NOTES
[facility]  INPATIENT PHYSICAL THERAPY  DAILY NOTE  [facility]    Time In: 1400  Time Out: 1430  Timed Code Treatment Minutes: 30 Minutes  Minutes: 30          Date: 2019  Patient Name: Júnior Mueller,  Gender:  male        MRN: [id number]  : 1970  (44 y.o.)     Referring Practitioner: LIDIA Mariee MD  Diagnosis: above knee amputation of right lower extremity  Treatment Diagnosis: R AKA  Additional Pertinent Hx: HX:  BKA with wound over fibula. Underwent multiple debridements with no success. Pt admitted for AKA, performed 2019. To [facility] on      Past Medical History:   Diagnosis Date    Bipolar 2 disorder [facility])     previously followed with Dr. Samantha Luna and Erica Almeida in [facility] Diabetes mellitus type 2, uncontrolled ([address]     HgbA1c on 2019 was 9.1.     Diabetic polyneuropathy ([address]     Diabetic ulcer of right foot associated with type 2 diabetes mellitus ([address] 12/10/2015    Essential hypertension     \"never been on b/p medication that I know of\"    GERD (gastroesophageal reflux disease)     Hammer toe of left foot     Heart murmur     denies any chest pain or palpitations    History of tobacco abuse     Hx of AKA (above knee amputation), right ([address] 2019    Dr. Martinez Smart edema, diabetic, bilateral ([address] 2018    Dr. 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 ([address] 2016     Past Surgical History:   Procedure Laterality Date    ABSCESS DRAINAGE Right     foot    AMPUTATION ABOVE KNEE Right 2019    RIGHT ABOVE KNEE AMPUTATION performed by Annabel Devlin MD at [address] Right 2016    I & D    INCISION AND DRAINAGE Right 2019    I&D RIGHT STUMP performed by Annabel Devlin MD at [address] [place] [address],[address] Right 07/20/2016    LEG AMPUTATION BELOW KNEE Right 4/4/2019    I&D AND REVISION OF AMPUTATION RIGHT [facility]    sole of foot I&D    [place] DRAIN INFECT SHOULDER BURSA Left 8/18/2017    LEFT SHOULDER INCISION AND DRAINAGE performed by Elio Shaffer MD at [address] [place] [address] OFFICE/OUTPT [address] Right 9/20/2018    EXCISIONAL DEBRIDEMENT RIGHT BKA STUMP performed by Kalpana Cunningham MD at [facility] [address] Right 1/16/15    2nd toe with wound vac applied    WISDOM TOOTH EXTRACTION  ? when       Restrictions/Precautions:  Fall Risk, Weight Bearing, General Precautions     Right Lower Extremity Weight Bearing: Non Weight Bearing              Other position/activity restrictions: right AKA        Prior Level of Function:  ADL Assistance: Independent  Homemaking Assistance: Independent  Ambulation Assistance: Independent(wheelchair)  Transfer Assistance: Independent  Additional Comments: Pt reporting he was on TCU back in Feb 2019. Pt stating he was indep with ADL tasks at home. His wheelchair will fit through all his doorways at home. Subjective:     Subjective: RN approved session. pt in [facility] upon arrival very pleasant and cooperative, motivated. Pain:  Yes.   Pain Assessment  Pain Assessment: 0-10  Pain Level: 6(R stump/ant thigh)       Social/Functional:  Lives With: Other (comment)(Roommate)  Type of Home: House  Home Layout: Two level, Able to Live on Main level with bedroom/bathroom  Home Access: Ramped entrance  Home Equipment: Wheelchair-manual, Rolling walker(wheelchair not in good shape)     Objective:       Transfers  Sit to Stand: Contact guard assistance  Stand to sit: Contact guard assistance       Ambulation 1  Surface: level tile  Device: Rolling Walker  Assistance: Contact guard assistance  Quality of Gait: cues to slow down for safety, min unsteady, fatigued quickly, decreased hop height  Distance: 23'x1 12'x1       Balance  Comments: dynamic reaching activity to challenge standing olerance and balance on feet. pt reaching in all directions with L UE and tossing item at target. pt fatigued after ~2-3 mins x2 trials. CGA throughout 1 mild LOB with CGA to correct, pt requestingt os it beteen trials    Propulsion 1  Propulsion: Manual  Level: Ramp  Method: RUE;LUE;LLE  Level of Assistance: Modified independent;Contact guard assistance(MI on level tile, SBA/CGA on ramp)  Description/ Details: good velocity, control with turns on level tile, close SBA/CGA for safety on ramp  Distance: 300'x2 125'x2 25' ramp    Exercises:  Exercises  Comments: seated BLE marching, LAQ, hs curls L, hip ab/add, glute sets, heel/toe raises L for improved strengh and mobility     Activity Tolerance:  Activity Tolerance: Patient limited by pain; Patient Tolerated treatment well    Assessment: Body structures, Functions, Activity limitations: Decreased functional mobility , Decreased ADL status, Decreased strength, Decreased endurance, Decreased balance  Assessment: pt tolerated session well. pt demos decreased strength, tolerance to standing, endurance and will benefit from cont skilled PT to improve these deficits  Prognosis: Good  The pt has met 0 STGs during his short stay on IPR. The pt's mobility and safety awareness continue to improve during his stay. He is limited by weakness, decreased balance, and poor tolerance to activity at this time. At this time he requires SBA for bed mobility and CGA for sit to/from stand and ambulation for short distances up to 25ft prior to fatigue. Pt mod I for wheelchair mobility on flat surface and CGA for ramp negotiation in wheelchair. He continues to require skilled PT services to maximize independence and mobility prior to returning home.         Discharge Recommendations:  Discharge Recommendations: Continue to assess pending progress    Patient Education:  Patient Education: POC, therex, transfers,

## 2019-04-26 LAB
ANION GAP SERPL CALCULATED.3IONS-SCNC: 13 MEQ/L (ref 8–16)
BUN BLDV-MCNC: 15 MG/DL (ref 7–22)
CALCIUM SERPL-MCNC: 9.2 MG/DL (ref 8.5–10.5)
CHLORIDE BLD-SCNC: 100 MEQ/L (ref 98–111)
CO2: 25 MEQ/L (ref 23–33)
CREAT SERPL-MCNC: 0.7 MG/DL (ref 0.4–1.2)
GFR SERPL CREATININE-BSD FRML MDRD: > 90 ML/MIN/1.73M2
GLUCOSE BLD-MCNC: 107 MG/DL (ref 70–108)
GLUCOSE BLD-MCNC: 153 MG/DL (ref 70–108)
GLUCOSE BLD-MCNC: 153 MG/DL (ref 70–108)
GLUCOSE BLD-MCNC: 157 MG/DL (ref 70–108)
GLUCOSE BLD-MCNC: 183 MG/DL (ref 70–108)
GLUCOSE BLD-MCNC: 52 MG/DL (ref 70–108)
POTASSIUM SERPL-SCNC: 4 MEQ/L (ref 3.5–5.2)
SODIUM BLD-SCNC: 138 MEQ/L (ref 135–145)

## 2019-04-26 PROCEDURE — 80048 BASIC METABOLIC PNL TOTAL CA: CPT

## 2019-04-26 PROCEDURE — 6360000002 HC RX W HCPCS: Performed by: NURSE PRACTITIONER

## 2019-04-26 PROCEDURE — 97535 SELF CARE MNGMENT TRAINING: CPT

## 2019-04-26 PROCEDURE — 97530 THERAPEUTIC ACTIVITIES: CPT

## 2019-04-26 PROCEDURE — 36415 COLL VENOUS BLD VENIPUNCTURE: CPT

## 2019-04-26 PROCEDURE — 6370000000 HC RX 637 (ALT 250 FOR IP): Performed by: PHYSICAL MEDICINE & REHABILITATION

## 2019-04-26 PROCEDURE — 97110 THERAPEUTIC EXERCISES: CPT

## 2019-04-26 PROCEDURE — 6370000000 HC RX 637 (ALT 250 FOR IP): Performed by: ORTHOPAEDIC SURGERY

## 2019-04-26 PROCEDURE — 82948 REAGENT STRIP/BLOOD GLUCOSE: CPT

## 2019-04-26 PROCEDURE — 97542 WHEELCHAIR MNGMENT TRAINING: CPT

## 2019-04-26 PROCEDURE — 1180000000 HC REHAB R&B

## 2019-04-26 PROCEDURE — 2709999900 HC NON-CHARGEABLE SUPPLY

## 2019-04-26 RX ORDER — MORPHINE SULFATE 2 MG/ML
2 INJECTION, SOLUTION INTRAMUSCULAR; INTRAVENOUS EVERY 4 HOURS PRN
Status: DISPENSED | OUTPATIENT
Start: 2019-04-26 | End: 2019-04-28

## 2019-04-26 RX ADMIN — INSULIN GLARGINE 55 UNITS: 100 INJECTION, SOLUTION SUBCUTANEOUS at 20:40

## 2019-04-26 RX ADMIN — SERTRALINE 150 MG: 100 TABLET, FILM COATED ORAL at 08:22

## 2019-04-26 RX ADMIN — INSULIN LISPRO 18 UNITS: 100 INJECTION, SOLUTION INTRAVENOUS; SUBCUTANEOUS at 12:59

## 2019-04-26 RX ADMIN — VITAMIN D, TAB 1000IU (100/BT) 5000 UNITS: 25 TAB at 08:22

## 2019-04-26 RX ADMIN — FAMOTIDINE 20 MG: 20 TABLET ORAL at 08:25

## 2019-04-26 RX ADMIN — MORPHINE SULFATE 2 MG: 2 INJECTION, SOLUTION INTRAMUSCULAR; INTRAVENOUS at 11:34

## 2019-04-26 RX ADMIN — POTASSIUM CHLORIDE 20 MEQ: 20 TABLET, EXTENDED RELEASE ORAL at 08:25

## 2019-04-26 RX ADMIN — INSULIN LISPRO 18 UNITS: 100 INJECTION, SOLUTION INTRAVENOUS; SUBCUTANEOUS at 17:42

## 2019-04-26 RX ADMIN — INSULIN GLARGINE 55 UNITS: 100 INJECTION, SOLUTION SUBCUTANEOUS at 08:26

## 2019-04-26 RX ADMIN — OXYCODONE AND ACETAMINOPHEN 2 TABLET: 5; 325 TABLET ORAL at 18:52

## 2019-04-26 RX ADMIN — MORPHINE SULFATE 2 MG: 2 INJECTION, SOLUTION INTRAMUSCULAR; INTRAVENOUS at 22:36

## 2019-04-26 RX ADMIN — VITAMIN D, TAB 1000IU (100/BT) 5000 UNITS: 25 TAB at 20:40

## 2019-04-26 RX ADMIN — OXYCODONE AND ACETAMINOPHEN 2 TABLET: 5; 325 TABLET ORAL at 10:45

## 2019-04-26 RX ADMIN — MIRTAZAPINE 15 MG: 15 TABLET, FILM COATED ORAL at 20:40

## 2019-04-26 RX ADMIN — INSULIN LISPRO 18 UNITS: 100 INJECTION, SOLUTION INTRAVENOUS; SUBCUTANEOUS at 08:26

## 2019-04-26 RX ADMIN — FUROSEMIDE 20 MG: 20 TABLET ORAL at 08:22

## 2019-04-26 ASSESSMENT — PAIN SCALES - GENERAL
PAINLEVEL_OUTOF10: 10
PAINLEVEL_OUTOF10: 10
PAINLEVEL_OUTOF10: 6
PAINLEVEL_OUTOF10: 8
PAINLEVEL_OUTOF10: 8
PAINLEVEL_OUTOF10: 7
PAINLEVEL_OUTOF10: 0
PAINLEVEL_OUTOF10: 8
PAINLEVEL_OUTOF10: 10
PAINLEVEL_OUTOF10: 10
PAINLEVEL_OUTOF10: 8

## 2019-04-26 ASSESSMENT — PAIN DESCRIPTION - LOCATION
LOCATION: LEG
LOCATION: LEG

## 2019-04-26 ASSESSMENT — PAIN DESCRIPTION - PAIN TYPE
TYPE: ACUTE PAIN
TYPE: ACUTE PAIN

## 2019-04-26 ASSESSMENT — PAIN DESCRIPTION - ORIENTATION
ORIENTATION: RIGHT;ANTERIOR
ORIENTATION: RIGHT

## 2019-04-26 NOTE — DISCHARGE SUMMARY
Orthopaedic Discharge Summary     Patient ID:  Enrique Herrera  [id number]  54 y.o.  1970    Admit date: 4/18/2019    Discharge date and time: 4/22/2019  5:07 PM     Admitting Physician: Milad Aly MD     Discharge Physician: Albert Perez    Admission Diagnoses: Infection of above knee amputation stump of right leg [facility]) [T87.43]    Discharge Diagnoses: sme    Admission Condition: good    Discharged Condition: good    Indication for Admission: s/p amputation    Surgical procedure: AKA    Consults: hospitalist        Disposition: TCC    Patient Instructions:   Discharge Medication List as of 4/22/2019  5:07 PM      CONTINUE these medications which have CHANGED    Details   HYDROcodone-acetaminophen (NORCO) 5-325 MG per tablet Take 1-2 tablets by mouth every 4-6 hours as needed for Pain for up to 7 days. , Disp-40 tablet, R-0Print      cyclobenzaprine (FLEXERIL) 10 MG tablet Take 1 tablet by mouth 3 times daily as needed for Muscle spasms, Disp-40 tablet, R-0Print         CONTINUE these medications which have NOT CHANGED    Details   cefTRIAXone (ROCEPHIN) 1 g injection Infuse 2 g intravenously every 24 hours for 26 days, Disp-52 g, R-0Print      insulin lispro (HUMALOG) 100 UNIT/ML injection vial Inject 18 Units into the skin 3 times daily (before meals)Historical Med      mirtazapine (REMERON) 15 MG tablet Take 15 mg by mouth nightly as neededHistorical Med      insulin glargine (BASAGLAR KWIKPEN) 100 UNIT/ML injection pen Inject 55 Units into the skin 2 times dailyHistorical Med      Continuous Blood Gluc  (FREESTYLE MARIANELA READER) LIANNE 1 Units by Does not apply route continuous, Disp-1 Device, R-0Normal      blood glucose monitor strips Accucheck Sheila Test Strips Test blood sugars 4 times daily DX:E11.65, Disp-400 strip, R-5, Normal      acetaminophen (TYLENOL) 500 MG tablet Take 1,000 mg by mouth every 6 hours as needed for PainHistorical Med      Lancets MISC Disp-100 each, R-3, PrintUse to test blood sugars 4 times daily DX:E11.65      Insulin Syringe-Needle U-100 29G X 1/2\" 0.3 ML MISC 4 TIMES DAILY AFTER MEALS AND BEFORE BEDTIME Starting u 8/23/2018, Disp-100 each, R-1, Print      Insulin Pen Needle 32G X 4 MM MISC DAILY Starting Thu 8/23/2018, Disp-100 each, R-0, Print      sertraline (ZOLOFT) 100 MG tablet Take 1.5 tablets by mouth daily, Disp-45 tablet, R-0Normal      Blood Glucose Monitoring Suppl LIANNE Disp-1 Device, R-0, NormalUse to test blood sugars DX:E11.65         STOP taking these medications       ibuprofen (IBU) 600 MG tablet Comments:   Reason for Stopping:             Activity: NWB R stump  Diet: diabetic diet  Wound Care: keep wound clean and dry    Follow-up with Jacques in 3 weeks.     SignedEnrigue Shorts  4/26/2019  2:34 PM

## 2019-04-26 NOTE — PLAN OF CARE
Problem: DISCHARGE BARRIERS  Goal: Patient's continuum of care needs are met  4/26/2019 1327 by NEFTALI Pena  Note:   Team conference held Thursday, 04/25/2019. Recommendations of the team were explained to the patient by NEFTALI Crystal, and Dr. Monroe Santillan. Team is recommending that patient continue on acute inpatient rehab for six more days, with expected discharge date of Wednesday, 05/01/2019. Following discharge, team is recommending patient continue therapy with home exercise program until able to receive new prosthesis. Care plan reviewed with patient. Patient verbalized understanding of the plan of care and contributed to goal setting. SW contacted Antonino Rivera at [address] [place] [address] to inform patient will no longer need services upon discharge. SW contacted [facility] with Option 1 (IV antibiotics) to provide update on patient's progress and IV antibiotic treatment completed on 04/25/2019; left message. SW to follow and maintain involvement in discharge planning. 4/26/2019 1034 by Matt Ledezma RN  Outcome: Met This Shift  Note:   Patient speaking openly about discharge plan. Patient up in room as tolerated, ambulating and tolerating well, preforming ADLs. Pt tolerating oral medications. Nutritional status and needs discussed, tolerating PO well. Patient participating in plan of care, discussing options, needs and concerns. Pt will be discharged home with family support.

## 2019-04-26 NOTE — PROGRESS NOTES
Orthopedic Surgery      Orthopaedic Attending Note    Patient seen and evaluated     Discussed the current issue 1 week s/p R AKA    Pt is complaining of pain to anterior right thigh. Minimal amount of serosanguinous drainage noted from center of the wound. incison well approximated with staples, no redness or significant drainage noted. Pt states he is working with therapy 3 hours a day and leg becomes painful after. Informed pt he will still have pain until this heals. Will add morphine for severe pain for 2 days only and then discontinue. Dry drsg changes as needed. Stump  or compression drsg to R stump for molding and swelling. Will see as needed for continued issues.      Electronically signed by MARILYN Jones CNP on 4/26/2019 at 10:48 AM

## 2019-04-26 NOTE — FLOWSHEET NOTE
04/25/19 2009   Encounter Summary   Services provided to: Patient   Referral/Consult From: [facility] Family members   Place of [facility] can [employer] Completed   Continue Visiting Yes  (4/25/19 contniue support )   Complexity of Encounter Moderate   Length of Encounter 15 minutes   Spiritual/Taoist   Type Spiritual support     Jacinto Hansen is a 44year old male. Patient is resting in his bed awake and engaged in conversation with this . During conversation between patient and , patient shared with  that he had surgery where his leg was amputated beneath the kneel. Patient Patient told me, \"Over all, I am doing fine, learning to move around in a different way, but will make it through. I am hopeful and trust God for strength. \" Patient shared with me that his bety in God is what helping him to go through life a new again. Patient is a member of  can [address] [place] [address] [facility]  provided active listening, emotional and spiritual support.  also nurtured hope and shared words of encouragement to patient. Patient was receptive to  visit and appeared to be taking this life altering situation well. Patient displayed incredible bety during this encounter.  will follow up for continue support as needed.

## 2019-04-26 NOTE — PROGRESS NOTES
[place] [address]  INPATIENT OCCUPATIONAL THERAPY  - [facility],[address]  DAILY NOTE    Time:  Time In: 1430  Time Out: 1500  Timed Code Treatment Minutes: 30 Minutes  Minutes: 30    Date: 2019  Patient Name: Jericho Hadley,   Gender: male      Room: [facility]/068-A  MRN: [id number]  : 1970  (44 y.o.)  Referring Practitioner: Dr. Joselin Bryant   Diagnosis: Above knee amputation of right lower extremity   Additional Pertinent Hx: 44 y.o. 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 He was admitted 2019 for for complaint of pain secondary to a wound in his prior below knee amputation. He originally had a RIGHT below-knee amputation for persistent osteomyelitis in his RIGHT foot on 2016 and subsequently developed a poorly healing wound and osteomyelitis with Streptococcus agalactiae. On 2019 he underwent incision and drainage with saucerization of the RIGHT tibia and fibula by Dr. Audi Chung and a wound VAC was placed and he was discharged to home with home health with IV Rocephin. He had low-grade temperatures and leukocytosis noted at home by the home health nurse and he was referred to come back into the emergency department for evaluation. On 2019 he was agreeable to having a RIGHT above-knee amputation by Dr. Audi Chung. The patient is a poorly controlled type II diabetic and has not had controlled hemoglobin A1c values at any time from  2 .     Past Medical History:   Diagnosis Date    Bipolar 2 disorder ([facility]     previously followed with Dr. Quita Mejía and Magdiel Hamm in [facility] Diabetes mellitus type 2, uncontrolled ([facility]     HgbA1c on 4/2/2019 was 9.1.  Diabetic polyneuropathy ([address]     Diabetic ulcer of right foot associated with type 2 diabetes mellitus ([address] 12/10/2015    Essential hypertension     \"never been on b/p medication that I know of\"    GERD (gastroesophageal reflux disease)     Hammer toe of left foot     Heart murmur     denies any chest pain or palpitations    History of tobacco abuse     Hx of AKA (above knee amputation), right ([facility] 04/18/2019    Dr. Oren Zuñiga edema, diabetic, bilateral ([facility] 05/04/2018    Dr. 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 ([facility] 6/29/2016     Past Surgical History:   Procedure Laterality Date    ABSCESS DRAINAGE Right     foot    AMPUTATION ABOVE KNEE Right 4/18/2019    RIGHT ABOVE KNEE AMPUTATION performed by Tessy Tate MD at [facility] Right 07/01/2016    I & D    INCISION AND DRAINAGE Right 2/18/2019    I&D RIGHT STUMP performed by Tessy Tate MD at [address],[address] Right 07/20/2016    LEG AMPUTATION BELOW KNEE Right 4/4/2019    I&D AND REVISION OF AMPUTATION RIGHT LEG performed by Tessy Tate MD at [address] Right 1/14/15    sole of foot I&D    [place] DRAIN INFECT SHOULDER BURSA Left 8/18/2017    LEFT SHOULDER INCISION AND DRAINAGE performed by Tessy Tate MD at [address] OFFICE/OUTPT [facility] Right 9/20/2018    EXCISIONAL DEBRIDEMENT RIGHT BKA STUMP performed by Gagandeep Epps MD at [place] [address] Right 1/16/15    2nd toe with wound vac applied    WISDOM TOOTH EXTRACTION  ? when       Restrictions/Precautions:  Fall Risk, Weight Bearing, General Precautions     Right Lower Extremity Weight Bearing: Non Weight Bearing     Other position/activity restrictions: right AKA        Prior Level of Function:  ADL Assistance: Independent  Homemaking Assistance: Independent  Ambulation Assistance: Independent(wheelchair)  Transfer Assistance: Independent  Additional Comments: Pt reporting he was on TCU back in Feb 2019. Pt stating he was indep with ADL tasks at home. His wheelchair will fit through all his doorways at home. Subjective  Family / Caregiver Present: No    Subjective: Pt seated in w/c upon arrival, agreeable to OT session. Overall Orientation Status: Within Normal Limits    Pain:  Pain Assessment  Patient Currently in Pain: Denies  Pain Assessment: 0-10  Pain Level: 10  Pain Type: Acute pain  Pain Location: Leg  Pain Orientation: Right    Objective  Overall Cognitive Status: WFL    Instrumental ADLs: Yes  Light Housekeeping  Light Housekeeping Level: Wheelchair  Light Housekeeping Level of Assistance: Modified independent  Light Housekeeping: Pt retrieved laundry at Mod I requiring no cues for safety or technique. Good safety awareness. Transfers  Sit to stand: Supervision  Stand to sit: Supervision  Transfer Comments: to/from w/c    Balance  Sitting Balance: Modified independent   Standing Balance: (Mod I at laundry with 1UE supported, SBA for donning shorts at W/C unsupported)     Time: x45 seconds x2 events  Activity: clothing managemetn and IADLs     Functional Mobility  Functional - Mobility Device: Wheelchair  Activity: Other  Assist Level: Modified independent   Functional Mobility Comments: Completed functional mobility propelling w/c to/from apartment and to 1st floor library at quick pace, one rest break required.      Activity Tolerance:  Activity Tolerance: Patient Tolerated treatment well  Activity Tolerance: significnat RLE pain this date requiring inc rest breaks    Assessment:  Performance deficits / Impairments: Decreased ADL status, Decreased safe awareness, Decreased balance, Decreased endurance, Decreased strength  Prognosis: Good    Discharge Recommendations:  Discharge Recommendations: Home with assist PRN(UB strengthening HEP )    Patient Education:  Patient Education: IADL strategies, w/c mobility. Equipment Recommendations: Other: Pt has a tub transfer bench. Uses a standard toilet, but denies needs. Recommend grab bar near toilet instead of using towel bar. Safety:  Safety Devices in place: Yes  Type of devices: All fall risk precautions in place, Call light within reach, Left in chair    Plan:  Times per week: 5x/wk for 90 min and 1x/wk for 30 min    Current Treatment Recommendations: Patient/Caregiver Education & Training, Strengthening, Balance Training, Functional Mobility Training, Safety Education & Training, Self-Care / ADL, Endurance Training    Goals:  Patient goals : Improve my strength and become steadier     Short term goals  Time Frame for Short term goals: 1 week   Short term goal 1: Pt will tolerate standing 1 min with S while releasing 1 UE from walker to improve balance needed to manage clothing. Short term goal 2: Pt will complete sit pivot transfer using safe technique with SBA to improve safety when transferring to toilet. Short term goal 3: Pt will don shorts with Mod I to improve indep with getting dressed at home. Short term goal 4: Pt will preform IADL task from wheelchair level with mod I and no cues for safety to improve indep within home. Long term goals  Time Frame for Long term goals : 2 weeks   Long term goal 1: Pt will complete shower and dressing routine with mod I to return to prior level of independence. Long term goal 2: Pt will complete BUE HEP using handout with Mod I to improve UB strength to 5/5.    If patient is discharged prior to progress note completion, this note is to serve as the discharge note with all goals being unmet unless indicated otherwise.

## 2019-04-26 NOTE — PROGRESS NOTES
[facility]    Time In: 0830  Time Out: 0930  Timed Code Treatment Minutes: 60 Minutes  Minutes: 60          Date: 2019  Patient Name: Newcomb Judith,  Gender:  male        MRN: [id number]  : 1970  (44 y.o.)     Referring Practitioner: LIDIA John MD  Diagnosis: above knee amputation of right lower extremity  Treatment Diagnosis: R AKA  Additional Pertinent Hx: HX:  BKA with wound over fibula. Underwent multiple debridements with no success. Pt admitted for AKA, performed 2019. To [facility] on      Past Medical History:   Diagnosis Date    Bipolar 2 disorder [facility])     previously followed with Dr. Day Leos and Valerio Johnson [facility] [place] Diabetes mellitus type 2, uncontrolled ([address]     HgbA1c on 2019 was 9.1.     Diabetic polyneuropathy ([address]     Diabetic ulcer of right foot associated with type 2 diabetes mellitus ([address] 12/10/2015    Essential hypertension     \"never been on b/p medication that I know of\"    GERD (gastroesophageal reflux disease)     Hammer toe of left foot     Heart murmur     denies any chest pain or palpitations    History of tobacco abuse     Hx of AKA (above knee amputation), right ([address] 2019    Dr. Álvarez Mac edema, diabetic, bilateral ([address] 2018    Dr. Niels ePna referred to retina specialist for 2nd opinion    Marijuana abuse in remission     Onychomycosis     WD-Skin ulcer of fourth toe of right foot with necrosis of bone ([address] 2016     Past Surgical History:   Procedure Laterality Date    ABSCESS DRAINAGE Right     foot    AMPUTATION ABOVE KNEE Right 2019    RIGHT ABOVE KNEE AMPUTATION performed by Chichi Rodriguez MD at [address] Right 2016    I & D    INCISION AND DRAINAGE Right 2019    I&D RIGHT STUMP performed by Chichi Rodriguez MD at [address] [place] [address],[address] Right 07/20/2016    LEG AMPUTATION BELOW KNEE Right 4/4/2019    I&D AND REVISION OF AMPUTATION RIGHT LEG performed by Franco Pike MD at [address] [facility] Right 1/14/15    sole of foot I&D    [place] DRAIN INFECT SHOULDER BURSA Left 8/18/2017    LEFT SHOULDER INCISION AND DRAINAGE performed by Franco Pike MD at [facility] OFFICE/OUTPT [address] Right 9/20/2018    EXCISIONAL DEBRIDEMENT RIGHT BKA STUMP performed by Jana Sheth MD at [address] Right 1/16/15    2nd toe with wound vac applied    WISDOM TOOTH EXTRACTION  ? when       Restrictions/Precautions:  Fall Risk, Weight Bearing, General Precautions     Right Lower Extremity Weight Bearing: Non Weight Bearing              Other position/activity restrictions: right AKA        Prior Level of Function:  ADL Assistance: Independent  Homemaking Assistance: Independent  Ambulation Assistance: Independent(wheelchair)  Transfer Assistance: Independent  Additional Comments: Pt reporting he was on TCU back in Feb 2019. Pt stating he was indep with ADL tasks at home. His wheelchair will fit through all his doorways at home. Subjective:     Subjective: Pt. seated in WC upon arrival and agrees to therapy session. Pt. quiet this date and reports that he did not sleep well overnight secondary to pain. Pain:  Yes. Pain Assessment  Pain Assessment: 0-10  Pain Level: 8  Pain Type: Acute pain  Pain Location: Leg  Pain Orientation: Right; Anterior       Social/Functional:  Lives With: Other (comment)(Roommate)  Type of Home: House  Home Layout: Two level, Able to Live on Main level with bedroom/bathroom  Home Access: Ramped entrance  Home Equipment: Wheelchair-manual, Rolling walker(wheelchair not in good shape)     Objective:  Rolling to Left: Stand by assistance  Rolling to Right: Stand by assistance  Supine to Sit: Stand by assistance  Sit to Supine: Stand by assistance  Scooting: Stand by assistance    Transfers  Sit to Stand: Contact guard assistance(Multiple times during session. )  Stand to sit: Contact guard assistance  Stand Pivot Transfers: Contact guard assistance(Using RW. )       Ambulation 1  Surface: level tile  Device: Rolling Walker  Assistance: Contact guard assistance  Quality of Gait: Decreased hop height. Slightly unsteady at times requiring cues to slow down. fatigues easily. Distance: 45' x 1         Balance  Comments: Pt. sat on dynadisk on edge of mat while completing dynamic seated balance activity for approximately 15' with rest 3 rest breaks needed. Pt. slightly unsteady at times but requires no physical assistance. Propulsion 1  Propulsion: Manual  Level: Level Tile  Method: RUE;LUE;LLE  Level of Assistance: Modified independent;Stand by assistance(close SBA for ramp. )  Description/ Details: Good speed manueverability. Distance: 150' x 1, Community distances including 25' ramp            Exercises:  Exercises  Comments: Seated B LE ther ex 10x each consisting of marches, hip abd/add AROM, glute sets, L LE long arc quads, ankle pumps, resisted hip abd/add, and resisted HS curls all to increase strength and improve functional mobility             Activity Tolerance:  Activity Tolerance: Patient limited by pain; Patient Tolerated treatment well    Assessment: Body structures, Functions, Activity limitations: Decreased functional mobility , Decreased ADL status, Decreased strength, Decreased endurance, Decreased balance  Assessment: Pt. tolerated session fairly well. Pt. pleasant and cooperative throughout session but limited by pain and fatigue. Pt. demos good mobility with WC. Pt. slightly unsteady during gait and fatigues easily. Pt. would benefit from continued skilled PT to increase strength and endurance to enhance functional mobility.    Prognosis: Good          Discharge Recommendations:  Discharge Recommendations: Continue to assess pending progress    Patient Education:  Patient Education: POC, Ther ex, ambulation, transfers    Equipment Recommendations:  Equipment Needed: Yes(w/c, has RW)    Safety:  Type of devices: Gait belt, All fall risk precautions in place, Positioning belt, Patient at risk for falls, Left in chair    Plan:  Times per week: 5x/wk 90 min, 1x/wk 30 min  Current Treatment Recommendations: Strengthening, Transfer Training, Wheelchair Mobility Training, Patient/Caregiver Education & Training, Functional Mobility Training, Safety Education & Training, Positioning, Gait Training, Balance Training, Endurance Training, ADL/Self-care Training, Stair training, Home Exercise Program, Equipment Evaluation, Education, & procurement    Goals:  Patient goals : go home    Short term goals  Time Frame for Short term goals: 1 week  Short term goal 1: Pt to perform supine to/from sit at mod I to get in and out of bed  Short term goal 2: Pt to perform sit to/from stand at S with proper hand placement to rise from bed or chair  Short term goal 3: Pt to perform stand pivot with RW at SBA to get to and from bed and wheelchair  Short term goal 4: Pt to ambulate 30ft with RW at SBA to walk to and from restroom  Short term goal 5: Pt to propel wheelchair community distances and up and down 25 ft ramp at mod I for functional mobility    Long term goals  Time Frame for Long term goals : 2 weeks  Long term goal 1: Pt to perform sit to/from stand at mod I with proper hand placement to rise from bed or chair  Long term goal 2: Pt to perform stand pivot with RW at mod I to get to and from bed and wheelchair  Long term goal 3: Pt to ambulate 50ft with RW at mod I to walk to and from restroom  Long term goal 4: Pt to negotiate car transfer at S for transport to and from medical appts  If patient is discharged prior to progress note completion, this note is to serve as the discharge note with all goals being unmet unless indicated otherwise.

## 2019-04-26 NOTE — PROGRESS NOTES
Physical Medicine & Rehabilitation  Progress Note    Chief Complaint:  RIGHT above knee amputation    Subjective:  Care conference held today with proposed discharge date on  to home with HEP. Family not present. He is agreeable to plan and asks if he can get a new WC. Pain controlled. No specific concerns. Rehabilitation:  Physical therapy: FIMS:  Bed Mobility: Scooting: Stand by assistance    Transfers: Sit to Stand: Contact guard assistance  Stand to sit: Contact guard assistance  Stand Pivot Transfers: Contact guard assistance, Ambulation 1  Surface: level tile  Device: Rolling Walker  Assistance: Contact guard assistance  Quality of Gait: Pt. slightly unsteady but with no LOB. Decreased hop length and height. Distance: Multiple short distances. Comments: refused further distance secondary to pain in R stump,      FIMS: Bed, Chair, Wheel Chair: 4 - Requires steadying assistance only <25% assist  and/or requires assist with one leg only  Walk: 0 - Activity Not Assessed/Does Not Occur  Wheel Chair: 6 - Modified [place] Operates wheelchair at least 150 feet with an ambulatory device, orthosis or prosthesis OR requires extra amount of time OR there is concern for safety  Distance Traveled in Wheel Chair: 200'  Stairs: 0 - Activity Does not Occur ( 0 only for the admission assessment), PT Equipment Recommendations  Equipment Needed: Yes(w/c, has RW), Assessment: Pt. tolerated session fairly well. Pt. pleasant and cooperative throughout session but limited by pain. Pt. demos decreased standing tolerance and fatigues quickly. Pt. would benefit from continued skilled PT to enhance functional mobility.      Occupational therapy: FIMS:  Eatin - Patient feeds self  Groomin - Requires setup/cues to do all tasks  Bathin - Able to bathe all 10 areas with setup/sup/cues  Dressing-Upper: 5 - Requires setup/supervision/cues and/or requires assist with presthesis/brace only  Dressing-Lower: 5 - Requires setup/supervision/cues and/or staff applies TEDS/prosthesis/brace only  Toiletin - Requires setup/supervision/cues  Toilet Transfer: 5 - Requires setup/supervision/cues  Tub Transfer: 4 - Minimal contact assistance, pt. expends 75% or more effort(CGA squat pivot to and from shower bench ),  , Assessment: Pt progressing towards goals meeting 1/4 STGs already during short stay on IP rehab. Pt is motivated to regain strength and balance to return home. He requires rest breaks due to fatigue and deconditioning from hospital stay,. But has already shown improvements from initial evaluation. Toy Moritz has a tendency to complete transfers his way, and would benefit from additional OT to improve safety and technique to ensure safety at home.     Speech therapy: FIMS: Comprehension: 5 - Patient understands basic needs (hungry/hot/pain)  Expression: 6 - Device used to express complex ideas/needs  Social Interaction: 6 - Patient requires medication for mood and/or effect  Problem Solvin - Patient able to solve simple/routine tasks  Memory: 6 - Patient requires device to recall (e.g. memory book)    Review of Systems:  CONSTITUTIONAL:  negative  EYES:  positive for  visual disturbance  HEENT:  negative  RESPIRATORY:  negative  CARDIOVASCULAR:  negative  GASTROINTESTINAL:  positive for constipation  GENITOURINARY:  negative  SKIN:  negative  HEMATOLOGIC/LYMPHATIC:  positive for edema  MUSCULOSKELETAL:  positive for  myalgias, arthralgias, pain and bone pain  NEUROLOGICAL:  positive for visual disturbance, pain and tingling  BEHAVIOR/PSYCH:  positive for Bipolar 1 disorder  10 point system review otherwise negative    Objective:  BP (!) 145/65   Pulse 105   Temp 97.9 °F (36.6 °C) (Axillary)   Resp 18   Ht 5' 6\" (1.676 m)   Wt 211 lb 2 oz (95.8 kg)   SpO2 97%   BMI 34.08 kg/m²     awake  Orientation:   person, place, time, situation  Mood: within normal limits  Affect: flat  General appearance:  in no acute distress, RIGHT AKA, up in [facility]     Memory:   grossly normal  Attention/Concentration: normal  Language:  normal     ROM:  normal NWB in RLL  Motor Exam:  Motor exam is 5 out of 5 all extremities with the exception of only RIGHT hip being testable     Sensory:  Touch:  Left Lower Extremity:  abnormal - over LEFT foot  Coordination:   normal     Skin: warm and dry, no rash or erythema and RIGHT thigh incision with staple closure C/D/I  Peripheral vascular: Pulses: Normal upper and lower extremity pulses with RIGHT LL not tetstable; Edema: 2+ in RIGHT LL    Diagnostics:   Recent Results (from the past 24 hour(s))   POCT glucose    Collection Time: 04/25/19  8:12 AM   Result Value Ref Range    POC Glucose 103 70 - 108 mg/dl   POCT glucose    Collection Time: 04/25/19 12:12 PM   Result Value Ref Range    POC Glucose 119 (H) 70 - 108 mg/dl   POCT glucose    Collection Time: 04/25/19  2:03 PM   Result Value Ref Range    POC Glucose 145 (H) 70 - 108 mg/dl   POCT Glucose    Collection Time: 04/25/19  5:28 PM   Result Value Ref Range    POC Glucose 101 70 - 108 mg/dl   POCT Glucose    Collection Time: 04/25/19  7:57 PM   Result Value Ref Range    POC Glucose 125 (H) 70 - 108 mg/dl     Impression:  1. S/p RIGHT above knee amputation for osteomyelitis of prior below knee amputation non healing wound by Dr. Hanna on 4/18/2019.  2. Gait instability. 3. S/p incision and drainage with saucerization of RIGHT tibia and fibula with poorly healing wound and osteomyelitis with Streptococcus agalactiae by Dr. Roel Lee on 4/4/2019.  4. S/p RIGHT below knee amputation for osteomyelitis of the RIGHT foot on 7/20/2016.  5. Type 2 DM, poorly controlled.  Last hemoglobin A1c was 9.1 on 4/2/2019.  Range 8.1 to 12.2 from 2015 to 2019. 6. Post operative blood loss anemia with initial Hgb of 11.7 on 4/16 down to 10.5 on 4/23.  7. Hyponatremia. 8. Leukocytosis. 9. Diabetic polyneuropathy. 10. Hypertension.   11. Diabetic macular edema bilaterally. 12. Gastroesophageal reflux disease. 13. Bipolar 2 disorder. 14. Non compliance with medical treatment. 15. History of marijuana abuse. 16. Hypovitaminosis D. 17. Lower limb edema. Plan:     ical management: Per primary team.     Consultants:  Orthopedic Surgery, Infectious Disease, Physical Medicine     Narcotic usage:  Percocet Last 24 hour usage 40mg. Stable. Last BM:     Ambulation/Mobility:  Quality of Gait: Pt. slightly unsteady but with no LOB. Decreased hop length and height. Distance: Multiple short distances. Wheelchair Activities  Propulsion: Yes  Distance: 150' x 1, 225' x 1    Acute/Rehabilitation Problems:  1. S/p RIGHT above knee amputation for osteomyelitis of prior below knee amputation non healing wound by Dr. Hanna on 4/18/2019.  1. Pain Control. 1. Percocet. 2. Tylenol. 3. Flexeril. 2. Wound care. 1. Call in to Dr. Rustam Perales to ask when/if limb can be wrapped. 3. Antibiotics. 1. Ceftriaxone q24H. End 4/25.  4. Have Stacia Esqueda ([phone]) from [facility] to see patient. Saw patient on 4/24. Recommended to determine if limb can be wrapped to reduce swelling. 2. Gait instability. 1. PT/OT  3. Type 2 DM, poorly controlled. 1. Last hemoglobin A1c was 9.1 on 4/2/2019.  Range 8.1 to 12.2 from 2015 to 2019.  2. Lantus 55units BID. 3. Humalog 18units before meals. 4. DIET CARB CONTROL. 5. Dietary Nutrition Supplements: Wound Healing Oral Supplement. 4. Post operative blood loss anemia with initial Hgb of 11.7 on 4/16 down to 10.5 on 4/23. 1. Monitor. 5. Hyponatremia. 1. 139 on 4/23. Resolved. 6. Leukocytosis. 1. WBC 10.1 on 4/23. Resolved. 7. Nutrition:  Consultation to dietician for nutritional counseling and recommendations. 8. TotP 7.5, alb 3.1, Vitamin 25OH level of 9 on 4/23. 1. Cholecalciferol 5000IU BID. 9. Electrolytes. 1. Normal on 4/23. 10. Lower limb edema. 1. Lasix 20mg daily. 1. K 20mEq daily  11.  Bladder: No issues. 12. Bowel: Senna, colace, MOM  13. Rehabilitation nursing will be involved for bowel, bladder, skin, and pain management. Nursing will also provide education and training to patient and family. 14. Prophylaxis:  DVT: SCDs LEFT leg. GI: Pepcid. 15.  and case management consultations for coordination of care and discharge planning.     Chronic Problems:  1. Diabetic polyneuropathy. 2. Hypertension. 1. No current medications. 3. Diabetic macular edema bilaterally. 4. Gastroesophageal reflux disease. 1. Pepcid. 5. Bipolar 2 disorder. 1. Sertraline. 2. Mirtazapine for sleep. 6. Non compliance with medical treatment.     Labs:  1. 4/23.     Infectious Disease:  1. Ceftriaxone.     Missed Therapy Time:  None     DME:    Discharge Plan:  Estimated Discharge Date: 5/1/2019   Destination: Home  Services at Discharge: Physical Therapy and Occupational Therapy HEP  Is patient appropriate for an outpatient driving evaluation? no  Equipment at Discharge: Sofiya Salamanca MD

## 2019-04-26 NOTE — CARE COORDINATION
Ray Yepez was evaluated today and a DME order was entered for a lightweight wheelchair because he requires this to successfully complete daily living tasks of bathing, toileting, personal cares, grooming and hygiene. A lightweight wheelchair is necessary due to the patient's impaired ambulation and mobility restrictions. The patient would not be able to resolve these daily living tasks using a cane or walker. The patient can self-propel a lightweight wheelchair safely in their home and can maneuver within their home with adequate access. The patient cannot self-propel in a standard wheelchair. The need for this equipment was discussed with the patient and he understands, is in agreement, and has not expressed an unwillingness to use the wheelchair. Ray Yepez can self propel a wheelchair and needs anti-rollback device because of ramps. The patient requires a standard cushion for his wheelchair due to prolonged time in the wheelchair during the day to prevent skin breakdown.

## 2019-04-26 NOTE — PROGRESS NOTES
[facility]  INPATIENT PHYSICAL THERAPY  DAILY NOTE  [address]    Time In: 1200  Time Out: 1230  Timed Code Treatment Minutes: 30 Minutes  Minutes: 30          Date: 2019  Patient Name: Ania Jamison,  Gender:  male        MRN: [id number]  : 1970  (44 y.o.)     Referring Practitioner: LIDIA Self MD  Diagnosis: above knee amputation of right lower extremity  Treatment Diagnosis: R AKA  Additional Pertinent Hx: HX:  BKA with wound over fibula. Underwent multiple debridements with no success. Pt admitted for AKA, performed 2019. To [facility] on      Past Medical History:   Diagnosis Date    Bipolar 2 disorder [facility])     previously followed with Dr. Jess Fontenot and Jeannie Castaneda in [facility] Diabetes mellitus type 2, uncontrolled ([address]     HgbA1c on 2019 was 9.1.     Diabetic polyneuropathy ([address]     Diabetic ulcer of right foot associated with type 2 diabetes mellitus ([address] 12/10/2015    Essential hypertension     \"never been on b/p medication that I know of\"    GERD (gastroesophageal reflux disease)     Hammer toe of left foot     Heart murmur     denies any chest pain or palpitations    History of tobacco abuse     Hx of AKA (above knee amputation), right ([address] 2019    Dr. Jose Rafael Ibarra edema, diabetic, bilateral ([address] 2018    Dr. Felicita Mendez referred to retina specialist for 2nd opinion    Marijuana abuse in remission     Onychomycosis     WD-Skin ulcer of fourth toe of right foot with necrosis of bone ([address] 2016     Past Surgical History:   Procedure Laterality Date    ABSCESS DRAINAGE Right     foot    AMPUTATION ABOVE KNEE Right 2019    RIGHT ABOVE KNEE AMPUTATION performed by Ernestine Gandara MD at [address] Right 2016    I & D    INCISION AND DRAINAGE Right 2019    I&D RIGHT STUMP performed by Ernestine Gandara MD at [address] [facility] Right 07/20/2016    LEG AMPUTATION BELOW KNEE Right 4/4/2019    I&D AND REVISION OF AMPUTATION RIGHT LEG performed by Di Monroy MD at [facility] Right 1/14/15    sole of foot I&D    MO DRAIN INFECT SHOULDER BURSA Left 8/18/2017    LEFT SHOULDER INCISION AND DRAINAGE performed by Di Monroy MD at [facility] OFFICE/OUTPT [address] Right 9/20/2018    EXCISIONAL DEBRIDEMENT RIGHT BKA STUMP performed by Lynna Claude, MD at [address] Right 1/16/15    2nd toe with wound vac applied    WISDOM TOOTH EXTRACTION  ? when       Restrictions/Precautions:  Fall Risk, Weight Bearing, General Precautions     Right Lower Extremity Weight Bearing: Non Weight Bearing              Other position/activity restrictions: right AKA        Prior Level of Function:  ADL Assistance: Independent  Homemaking Assistance: Independent  Ambulation Assistance: Independent(wheelchair)  Transfer Assistance: Independent  Additional Comments: Pt reporting he was on TCU back in Feb 2019. Pt stating he was indep with ADL tasks at home. His wheelchair will fit through all his doorways at home. Subjective:     Subjective: Pt in bed upon arrival and agrees to therapy. pt plesant and cooperative. c/o pain in R ant thigh/knots/cramping    Pain:  Yes.   Pain Assessment  Pain Assessment: 0-10  Pain Level: 8(R ant thigh)       Social/Functional:  Lives With: Other (comment)(Roommate)  Type of Home: House  Home Layout: Two level, Able to Live on Main level with bedroom/bathroom  Home Access: Ramped entrance  Home Equipment: Wheelchair-manual, Rolling walker(wheelchair not in good shape)     Objective:  Rolling to Right: Stand by assistance  Supine to Sit: Stand by assistance  Scooting: Stand by assistance    Transfers  Sit to Stand: Contact guard assistance  Stand to sit: Contact guard assistance  Stand Pivot Transfers: Contact guard assistance(EOB > WC using AD)       Ambulation 1  Surface: level tile  Device: Rolling Walker  Assistance: Contact guard assistance  Quality of Gait: Pt. slightly unsteady but with no LOB. decreasedd hop height, cues for shorter hop lenghts to improve stability   Distance: 8'x1         Balance  Comments: static standing at AD pt reaching for objects and tossing at target to improve stanidng tolerance, balance and stability with reachign. pt reaching in all directions min unsteady, fatigued quickly    Propulsion 1  Propulsion: Manual  Level: Level Tile  Method: RUE;LUE;LLE  Level of Assistance: Modified independent  Description/ Details: Good speed and manueverability. Distance: 150'x2       Exercises:  Exercises  Comments: none        Activity Tolerance:  Activity Tolerance: Patient limited by pain; Patient Tolerated treatment well    Assessment: Body structures, Functions, Activity limitations: Decreased functional mobility , Decreased ADL status, Decreased strength, Decreased endurance, Decreased balance  Assessment: pt tolerated session faiely well. pt dmeos decreased strength, endurance, fatigued quickly and was llimited by pain.  pt will benefit from cont skilled PT  Prognosis: Good          Discharge Recommendations:  Discharge Recommendations: Continue to assess pending progress    Patient Education:  Patient Education: POC, therex, gait, trnasfers, balance    Equipment Recommendations:  Equipment Needed: Yes(w/c, has RW)    Safety:  Type of devices: Gait belt, All fall risk precautions in place, Positioning belt, Patient at risk for falls, Left in chair    Plan:  Times per week: 5x/wk 90 min, 1x/wk 30 min  Current Treatment Recommendations: Strengthening, Transfer Training, Wheelchair Mobility Training, Patient/Caregiver Education & Training, Functional Mobility Training, Safety Education & Training, Positioning, Gait Training, Balance Training, Endurance Training, ADL/Self-care Training, Stair training, [employer], Equipment Evaluation,

## 2019-04-26 NOTE — PROGRESS NOTES
Medical History:   Diagnosis Date    Bipolar 2 disorder [facility])     previously followed with Dr. Zain Muir and Isaura Han in [facility] Diabetes mellitus type 2, uncontrolled ([facility]     HgbA1c on 4/2/2019 was 9.1.  Diabetic polyneuropathy ([address]     Diabetic ulcer of right foot associated with type 2 diabetes mellitus ([address] 12/10/2015    Essential hypertension     \"never been on b/p medication that I know of\"    GERD (gastroesophageal reflux disease)     Hammer toe of left foot     Heart murmur     denies any chest pain or palpitations    History of tobacco abuse     Hx of AKA (above knee amputation), right ([facility] 04/18/2019    Dr. Jodi Bryan edema, diabetic, bilateral ([facility] 05/04/2018    Dr. Ashley Salgado referred to retina specialist for 2nd opinion    Marijuana abuse in remission     Onychomycosis     WD-Skin ulcer of fourth toe of right foot with necrosis of bone ([facility] 6/29/2016     Past Surgical History:   Procedure Laterality Date    ABSCESS DRAINAGE Right     foot    AMPUTATION ABOVE KNEE Right 4/18/2019    RIGHT ABOVE KNEE AMPUTATION performed by Vivian Heath MD at [address] [facility] Right 07/01/2016    I & D    INCISION AND DRAINAGE Right 2/18/2019    I&D RIGHT STUMP performed by Vivian Heath MD at [address] [place] [address],[address] Right 07/20/2016    LEG AMPUTATION BELOW KNEE Right 4/4/2019    I&D AND REVISION OF AMPUTATION RIGHT LEG performed by Vivian Heath MD at [facility] Right 1/14/15    sole of foot I&D    [place] DRAIN INFECT SHOULDER BURSA Left 8/18/2017    LEFT SHOULDER INCISION AND DRAINAGE performed by Vivian Heath MD at [address] OFFICE/OUTPT [address] Right 9/20/2018    EXCISIONAL DEBRIDEMENT RIGHT BKA STUMP performed by Edenilson San MD at [address] Right 1/16/15    2nd toe with wound vac applied    WISDOM TOOTH EXTRACTION  ? when       Restrictions/Precautions:  Fall Risk, improve safety when transferring to toilet. Short term goal 3: Pt will don shorts with Mod I to improve indep with getting dressed at home. Short term goal 4: Pt will preform IADL task from wheelchair level with mod I and no cues for safety to improve indep within home. Long term goals  Time Frame for Long term goals : 2 weeks   Long term goal 1: Pt will complete shower and dressing routine with mod I to return to prior level of independence. Long term goal 2: Pt will complete BUE HEP using handout with Mod I to improve UB strength to 5/5. If patient is discharged prior to progress note completion, this note is to serve as the discharge note with all goals being unmet unless indicated otherwise.

## 2019-04-26 NOTE — PLAN OF CARE
Problem: Pain:  Goal: Pain level will decrease  Description  Pain level will decrease  Outcome: Met This Shift  Note:   Pain Assessment: 0-10  Pain Level: 10   Pain goal:  7  Is pain goal met at this time? Yes     Additional interventions to be implemented: medications  , position change and rest        Problem: Pain:  Goal: Control of acute pain  Description  Control of acute pain  Outcome: Met This Shift     Problem: Falls - Risk of:  Goal: Will remain free from falls  Description  Will remain free from falls  Outcome: Met This Shift  Note:   Call light, overbed table, and belongings within reach. Bed/ chair alarm activated. Up with 1 assist. Patient included in hourly rounds. Problem: Falls - Risk of:  Goal: Absence of physical injury  Description  Absence of physical injury  Outcome: Met This Shift     Problem: DISCHARGE BARRIERS  Goal: Patient's continuum of care needs are met  Outcome: Met This Shift  Note:   Patient speaking openly about discharge plan. Patient up in room as tolerated, ambulating and tolerating well, preforming ADLs. Pt tolerating oral medications. Nutritional status and needs discussed, tolerating PO well. Patient participating in plan of care, discussing options, needs and concerns. Pt will be discharged home with family support. Problem: Risk for Impaired Skin Integrity  Goal: Tissue integrity - skin and mucous membranes  Description  Structural intactness and normal physiological function of skin and  mucous membranes. Note:   No signs of skin breakdown. Skin warm, dry, intact. Mucous membranes pink, moist.  Continuing to monitor integrity and using precautions to protect skin against breakdown, friction and sheering. Turning and repositioning pt Q2H for protection and comfort. Pillows used for turning and elevating bony prominences. Assistance with turns/ambulation provided PRN. Incision well approximated and intact. No redness noted.   Bathed with cholrehexidine soap.      Problem: Bowel/Gastric:  Goal: Ability to achieve a regular elimination pattern will improve  Description  Ability to achieve a regular elimination pattern will improve  Note:   Active bowel sound. Last bm 4/25  pt refusing colace   Pt aware of plan of care, continuing to update and work with patient to address needs and expectations.

## 2019-04-27 LAB
GLUCOSE BLD-MCNC: 104 MG/DL (ref 70–108)
GLUCOSE BLD-MCNC: 124 MG/DL (ref 70–108)
GLUCOSE BLD-MCNC: 149 MG/DL (ref 70–108)
GLUCOSE BLD-MCNC: 235 MG/DL (ref 70–108)

## 2019-04-27 PROCEDURE — 82948 REAGENT STRIP/BLOOD GLUCOSE: CPT

## 2019-04-27 PROCEDURE — 97542 WHEELCHAIR MNGMENT TRAINING: CPT

## 2019-04-27 PROCEDURE — 6370000000 HC RX 637 (ALT 250 FOR IP): Performed by: ORTHOPAEDIC SURGERY

## 2019-04-27 PROCEDURE — 97110 THERAPEUTIC EXERCISES: CPT

## 2019-04-27 PROCEDURE — 1180000000 HC REHAB R&B

## 2019-04-27 PROCEDURE — 97116 GAIT TRAINING THERAPY: CPT

## 2019-04-27 PROCEDURE — 97535 SELF CARE MNGMENT TRAINING: CPT

## 2019-04-27 PROCEDURE — 6360000002 HC RX W HCPCS: Performed by: NURSE PRACTITIONER

## 2019-04-27 PROCEDURE — 6370000000 HC RX 637 (ALT 250 FOR IP): Performed by: PHYSICAL MEDICINE & REHABILITATION

## 2019-04-27 PROCEDURE — 97530 THERAPEUTIC ACTIVITIES: CPT

## 2019-04-27 RX ADMIN — MORPHINE SULFATE 2 MG: 2 INJECTION, SOLUTION INTRAMUSCULAR; INTRAVENOUS at 12:11

## 2019-04-27 RX ADMIN — DOCUSATE SODIUM 100 MG: 100 CAPSULE, LIQUID FILLED ORAL at 09:30

## 2019-04-27 RX ADMIN — FUROSEMIDE 20 MG: 20 TABLET ORAL at 09:30

## 2019-04-27 RX ADMIN — OXYCODONE AND ACETAMINOPHEN 2 TABLET: 5; 325 TABLET ORAL at 23:12

## 2019-04-27 RX ADMIN — CYCLOBENZAPRINE HYDROCHLORIDE 10 MG: 10 TABLET, FILM COATED ORAL at 23:12

## 2019-04-27 RX ADMIN — INSULIN LISPRO 18 UNITS: 100 INJECTION, SOLUTION INTRAVENOUS; SUBCUTANEOUS at 17:58

## 2019-04-27 RX ADMIN — INSULIN GLARGINE 55 UNITS: 100 INJECTION, SOLUTION SUBCUTANEOUS at 09:31

## 2019-04-27 RX ADMIN — OXYCODONE AND ACETAMINOPHEN 2 TABLET: 5; 325 TABLET ORAL at 17:58

## 2019-04-27 RX ADMIN — FAMOTIDINE 20 MG: 20 TABLET ORAL at 09:30

## 2019-04-27 RX ADMIN — OXYCODONE AND ACETAMINOPHEN 2 TABLET: 5; 325 TABLET ORAL at 08:57

## 2019-04-27 RX ADMIN — INSULIN LISPRO 18 UNITS: 100 INJECTION, SOLUTION INTRAVENOUS; SUBCUTANEOUS at 13:59

## 2019-04-27 RX ADMIN — VITAMIN D, TAB 1000IU (100/BT) 5000 UNITS: 25 TAB at 09:30

## 2019-04-27 RX ADMIN — INSULIN LISPRO 18 UNITS: 100 INJECTION, SOLUTION INTRAVENOUS; SUBCUTANEOUS at 09:31

## 2019-04-27 RX ADMIN — POTASSIUM CHLORIDE 20 MEQ: 20 TABLET, EXTENDED RELEASE ORAL at 09:30

## 2019-04-27 RX ADMIN — MIRTAZAPINE 15 MG: 15 TABLET, FILM COATED ORAL at 23:13

## 2019-04-27 RX ADMIN — VITAMIN D, TAB 1000IU (100/BT) 5000 UNITS: 25 TAB at 23:13

## 2019-04-27 RX ADMIN — SERTRALINE 150 MG: 100 TABLET, FILM COATED ORAL at 09:30

## 2019-04-27 ASSESSMENT — PAIN SCALES - GENERAL
PAINLEVEL_OUTOF10: 10
PAINLEVEL_OUTOF10: 8

## 2019-04-27 NOTE — PROGRESS NOTES
Physical Medicine & Rehabilitation  Progress Note    Chief Complaint:  RIGHT above knee amputation    Subjective:  Orthopedic Surgery saw patient, wound evaluated, cleared to start , and temporary addition of IV morphine for improved pain control. Lower limb edema in LEFT leg is better. No specific concerns. Rehabilitation:  Physical therapy: FIMS:  Bed Mobility: Scooting: Stand by assistance    Transfers: Sit to Stand: Contact guard assistance(Multiple times during session. )  Stand to sit: Contact guard assistance  Stand Pivot Transfers: Contact guard assistance(Using RW. ), Ambulation 1  Surface: level tile  Device: Rolling Walker  Assistance: Contact guard assistance  Quality of Gait: Decreased hop height. Slightly unsteady at times requiring cues to slow down. fatigues easily. Distance: 45' x 1  Comments: refused further distance secondary to pain in R stump,      FIMS: Bed, Chair, Wheel Chair: 4 - Requires steadying assistance only <25% assist  and/or requires assist with one leg only  Walk: 0 - Activity Not Assessed/Does Not Occur  Wheel Chair: 6 - Modified [place] Operates wheelchair at least 150 feet with an ambulatory device, orthosis or prosthesis OR requires extra amount of time OR there is concern for safety  Distance Traveled in Wheel Chair: 200'  Stairs: 0 - Activity Does not Occur ( 0 only for the admission assessment), PT Equipment Recommendations  Equipment Needed: Yes(w/c, has RW), Assessment: Pt. tolerated session fairly well. Pt. pleasant and cooperative throughout session but limited by pain and fatigue. Pt. demos good mobility with WC. Pt. slightly unsteady during gait and fatigues easily. Pt. would benefit from continued skilled PT to increase strength and endurance to enhance functional mobility.      Occupational therapy: FIMS:  Eatin - Patient feeds self  Groomin - Requires setup/cues to do all tasks  Bathin - Able to bathe all 10 areas with Impression:  1. S/p RIGHT above knee amputation for osteomyelitis of prior below knee amputation non healing wound by Dr. Hanna on 4/18/2019.  2. Gait instability. 3. S/p incision and drainage with saucerization of RIGHT tibia and fibula with poorly healing wound and osteomyelitis with Streptococcus agalactiae by Dr. Stephani Dash on 4/4/2019.  4. S/p RIGHT below knee amputation for osteomyelitis of the RIGHT foot on 7/20/2016.  5. Type 2 DM, poorly controlled.  Last hemoglobin A1c was 9.1 on 4/2/2019.  Range 8.1 to 12.2 from 2015 to 2019. 6. Post operative blood loss anemia with initial Hgb of 11.7 on 4/16 down to 10.5 on 4/23.  7. Hyponatremia. 8. Leukocytosis. 9. Diabetic polyneuropathy. 10. Hypertension. 11. Diabetic macular edema bilaterally. 12. Gastroesophageal reflux disease. 13. Bipolar 2 disorder. 14. Non compliance with medical treatment. 15. History of marijuana abuse. 16. Hypovitaminosis D. 17. Lower limb edema. Plan:     ical management: Per primary team.     Consultants:  Orthopedic Surgery, Infectious Disease, Physical Medicine     Narcotic usage:  Percocet Last 24 hour usage 20mg. Decreased. Morphine Last 24 hour usage 4mg. Last BM:     Ambulation/Mobility:  Quality of Gait: Decreased hop height. Slightly unsteady at times requiring cues to slow down. fatigues easily. Distance: 45' x 1    Wheelchair Activities  Propulsion: Yes  Distance: Community distances including 25' ramp    Acute/Rehabilitation Problems:  1. S/p RIGHT above knee amputation for osteomyelitis of prior below knee amputation non healing wound by Dr. Hanna on 4/18/2019.  1. Pain Control. 1. Percocet. 2. Tylenol. 3. Flexeril. 2. Wound care. 1. Call in to Dr. Stephani Dash to ask when/if limb can be wrapped. 2. Service saw patient 4/26.  1. Will add morphine for severe pain for 2 days only and then discontinue. 2. Dry dressing changes as needed.   3. Stump  or compression dressing to RIGHT stump for molding and swelling. 3. Antibiotics. 1. Ceftriaxone q24H. End 4/25.  4. Have Jefe Do ([phone]) from [facility] to see patient. Saw patient on 4/24. Recommended to determine if limb can be wrapped to reduce swelling. 2. Gait instability. 1. PT/OT  3. Type 2 DM, poorly controlled. 1. Last hemoglobin A1c was 9.1 on 4/2/2019.  Range 8.1 to 12.2 from 2015 to 2019.  2. Lantus 55units BID. 3. Humalog 18units before meals. 4. DIET CARB CONTROL. 5. Dietary Nutrition Supplements: Wound Healing Oral Supplement. 4. Post operative blood loss anemia with initial Hgb of 11.7 on 4/16 down to 10.5 on 4/23. 1. Monitor. 5. Hyponatremia. 1. 139 on 4/23. Resolved. 6. Leukocytosis. 1. WBC 10.1 on 4/23. Resolved. 7. Nutrition:  Consultation to dietician for nutritional counseling and recommendations. 8. TotP 7.5, alb 3.1, Vitamin 25OH level of 9 on 4/23. 1. Cholecalciferol 5000IU BID. 9. Electrolytes. 1. Normal on 4/23. 10. Lower limb edema. 1. Lasix 20mg daily. 1. K 20mEq daily  2. Edema is improving as of 4/26. 11. Bladder: No issues. 12. Bowel: Senna, colace, MOM  13. Rehabilitation nursing will be involved for bowel, bladder, skin, and pain management. Nursing will also provide education and training to patient and family. 14. Prophylaxis:  DVT: SCDs LEFT leg. GI: Pepcid. 15.  and case management consultations for coordination of care and discharge planning.     Chronic Problems:  1. Diabetic polyneuropathy. 2. Hypertension. 1. No current medications. 3. Diabetic macular edema bilaterally. 4. Gastroesophageal reflux disease. 1. Pepcid. 5. Bipolar 2 disorder. 1. Sertraline. 2. Mirtazapine for sleep. 6. Non compliance with medical treatment.     Labs:  1. 4/23.     Infectious Disease:  1. Ceftriaxone.     Missed Therapy Time:  None     DME:  Wendi Dickey was evaluated today and a DME order was entered for a lightweight wheelchair because he requires this to successfully complete daily living tasks of bathing, toileting, personal cares, grooming and hygiene. A lightweight wheelchair is necessary due to the patient's impaired ambulation and mobility restrictions. The patient would not be able to resolve these daily living tasks using a cane or walker. The patient can self-propel a lightweight wheelchair safely in their home and can maneuver within their home with adequate access. The patient cannot self-propel in a standard wheelchair. The need for this equipment was discussed with the patient and he understands, is in agreement, and has not expressed an unwillingness to use the wheelchair. Tex De La Rosa can self propel a wheelchair and needs anti-rollback device because of ramps. The patient requires a standard cushion for his wheelchair due to prolonged time in the wheelchair during the day to prevent skin breakdown.     Discharge Plan:  Estimated Discharge Date: 5/1/2019   Destination: Home  Services at Discharge: Physical Therapy and Occupational Therapy HEP  Is patient appropriate for an outpatient driving evaluation? no  Equipment at Discharge: Yaa Robles MD

## 2019-04-27 NOTE — PROGRESS NOTES
[facility]  [address] [facility]    Time In: 8061  Time Out: 0935  Timed Code Treatment Minutes: 60 Minutes  Minutes: 60          Date: 2019  Patient Name: Dena Ireland,  Gender:  male        MRN: [id number]  : 1970  (44 y.o.)     Referring Practitioner: LIDIA Reyez MD  Diagnosis: above knee amputation of right lower extremity  Treatment Diagnosis: R AKA  Additional Pertinent Hx: HX:  BKA with wound over fibula. Underwent multiple debridements with no success. Pt admitted for AKA, performed 2019. To [facility] on      Past Medical History:   Diagnosis Date    Bipolar 2 disorder [facility])     previously followed with Dr. Sharon Fonseca and Joaquina Lepe in [facility] Diabetes mellitus type 2, uncontrolled ([address]     HgbA1c on 2019 was 9.1.     Diabetic polyneuropathy ([address]     Diabetic ulcer of right foot associated with type 2 diabetes mellitus ([address] 12/10/2015    Essential hypertension     \"never been on b/p medication that I know of\"    GERD (gastroesophageal reflux disease)     Hammer toe of left foot     Heart murmur     denies any chest pain or palpitations    History of tobacco abuse     Hx of AKA (above knee amputation), right ([address] 2019    Dr. Pam Mari edema, diabetic, bilateral ([address] 2018    Dr. Rajni Deal referred to retina specialist for 2nd opinion    Marijuana abuse in remission     Onychomycosis     WD-Skin ulcer of fourth toe of right foot with necrosis of bone ([address] 2016     Past Surgical History:   Procedure Laterality Date    ABSCESS DRAINAGE Right     foot    AMPUTATION ABOVE KNEE Right 2019    RIGHT ABOVE KNEE AMPUTATION performed by Kell Carver MD at [address] [facility] Right 2016    I & D    INCISION AND DRAINAGE Right 2019    I&D RIGHT STUMP performed by Kell Carver MD at [address] [place] [address],[address] Right 07/20/2016    LEG AMPUTATION BELOW KNEE Right 4/4/2019    I&D AND REVISION OF AMPUTATION RIGHT LEG performed by Milad Aly MD at [address] [facility] Right 1/14/15    sole of foot I&D    TN DRAIN INFECT SHOULDER BURSA Left 8/18/2017    LEFT SHOULDER INCISION AND DRAINAGE performed by Milad Aly MD at [address] [facility] OFFICE/OUTPT [facility] Right 9/20/2018    EXCISIONAL DEBRIDEMENT RIGHT BKA STUMP performed by Amairani Goyal MD at [place] [address] Right 1/16/15    2nd toe with wound vac applied    WISDOM TOOTH EXTRACTION  ? when       Restrictions/Precautions:  Fall Risk, Weight Bearing, General Precautions     Right Lower Extremity Weight Bearing: Non Weight Bearing              Other position/activity restrictions: right AKA        Prior Level of Function:  ADL Assistance: Independent  Homemaking Assistance: Independent  Ambulation Assistance: Independent(wheelchair)  Transfer Assistance: Independent  Additional Comments: Pt reporting he was on TCU back in Feb 2019. Pt stating he was indep with ADL tasks at home. His wheelchair will fit through all his doorways at home. Subjective:     Subjective: Pt very pleasant and cooperative. Motivated.      Pain:   .      8/10- stump end- pt medicated during session with obvious relief     Social/Functional:  Lives With: Other (comment)(Roommate)  Type of Home: House  Home Layout: Two level, Able to Live on Main level with bedroom/bathroom  Home Access: Ramped entrance  Home Equipment: Wheelchair-manual, Rolling walker(wheelchair not in good shape)     Objective:       Transfers  Sit to Stand: Contact guard assistance(often SBA x 1 especially more towards end of session)  Stand to sit: Contact guard assistance(w/c, and toilet )  Stand Pivot Transfers: Contact guard assistance(pt bernstein w/c in front of toilet and turns using secure handholds to and from toilet)       Ambulation 1  Surface: level tile  Device: Rolling Walker  Assistance: Stand by assistance  Quality of Gait: steady and fair pace, low foot clearance but always did clear floor, wearing shoe   Distance: ~20 feet x 1          Balance  Comments: pt stood at toilet to manage pants up and down; pt perform in incriments with some extra time needed; pt performed own pericare in sitting following BM;     pt stood #1 for 3 mins moving rings from one side of halved hulla hoop to the other;  stand #2 moving rings again for improved weight shifting- 4 mins and performed some right hip extensions ~10 reps ;   stand #3 for about 3 mins perfroming some minisquats on left leg and hip extensions ~10-20 reps each ;    stand #4 approx 2 mins with hip ABD ~10 reps ;  PTA at light CGA for all , likely SBA x 1 next time - all done to improve standing tolerance/endurance     Propulsion 1  Propulsion: Manual  Level: Level Tile  Method: RUE;LUE;LLE  Level of Assistance: Stand by assistance  Description/ Details: CGA on 7K ramp for safety but was not needed in any way; pt managed control of chair very well on ramp; pt executes doorways , bernstein chair well and can back into spaces     Distance: approx 180 feet x 1 , 250 feet x 1 and several <50 feet distances                                    Exercises:  Exercises  Comments: seated therex x 20 reps each AP, LAQ, marches, glut sets and HS curls ; 2 sets for LAQ and marches ; done to improve ROM                                  Activity Tolerance:  Activity Tolerance: Patient Tolerated treatment well    Assessment:   Body structures, Functions, Activity limitations: Decreased functional mobility , Decreased ADL status, Decreased strength, Decreased endurance, Decreased balance  Assessment: rec cont skilled therapy for improved functional mobility strength and endurance   Prognosis: Good          Discharge Recommendations:  Discharge Recommendations: Continue to assess pending progress    Patient Education:  Patient Education: posture in standing    Equipment Recommendations:  Equipment Needed: Yes(w/c, has RW)    Safety:  Type of devices: All fall risk precautions in place, Left in chair, Call light within reach, Chair alarm in place, Nurse notified    Plan:  Times per week: 5x/wk 90 min, 1x/wk 30 min  Current Treatment Recommendations: Strengthening, Transfer Training, Wheelchair Mobility Training, Patient/Caregiver Education & Training, Functional Mobility Training, Safety Education & Training, Positioning, Gait Training, Balance Training, Endurance Training, ADL/Self-care Training, Stair training, Home Exercise Program, Equipment Evaluation, Education, & procurement    Goals:  Patient goals : go home    Short term goals  Time Frame for Short term goals: 1 week  Short term goal 1: Pt to perform supine to/from sit at mod I to get in and out of bed  Short term goal 2: Pt to perform sit to/from stand at S with proper hand placement to rise from bed or chair  Short term goal 3: Pt to perform stand pivot with RW at SBA to get to and from bed and wheelchair  Short term goal 4: Pt to ambulate 30ft with RW at SBA to walk to and from restroom  Short term goal 5: Pt to propel wheelchair community distances and up and down 25 ft ramp at mod I for functional mobility    Long term goals  Time Frame for Long term goals : 2 weeks  Long term goal 1: Pt to perform sit to/from stand at mod I with proper hand placement to rise from bed or chair  Long term goal 2: Pt to perform stand pivot with RW at mod I to get to and from bed and wheelchair  Long term goal 3: Pt to ambulate 50ft with RW at mod I to walk to and from restroom  Long term goal 4: Pt to negotiate car transfer at S for transport to and from medical appts  If patient is discharged prior to progress note completion, this note is to serve as the discharge note with all goals being unmet unless indicated otherwise.

## 2019-04-27 NOTE — PROGRESS NOTES
[address]  DAILY NOTE    Time:  Time In: 1100  Time Out: 1230  Timed Code Treatment Minutes: 90 Minutes  Minutes: 90          Date: 2019  Patient Name: Sho Antonio,   Gender: male      Room: -68/068-A  MRN: [id number]  : 1970  (44 y.o.)  Referring Practitioner: Dr. Sartah Hodgson   Diagnosis: Above knee amputation of right lower extremity   Additional Pertinent Hx: 44 y.o. 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 He was admitted 2019 for for complaint of pain secondary to a wound in his prior below knee amputation. He originally had a RIGHT below-knee amputation for persistent osteomyelitis in his RIGHT foot on 2016 and subsequently developed a poorly healing wound and osteomyelitis with Streptococcus agalactiae. On 2019 he underwent incision and drainage with saucerization of the RIGHT tibia and fibula by Dr. [address] and a wound VAC was placed and he was discharged to home with home health with IV Rocephin. He had low-grade temperatures and leukocytosis noted at home by the home health nurse and he was referred to come back into the emergency department for evaluation. On 2019 he was agreeable to having a RIGHT above-knee amputation by Dr. Ketty Brody. The patient is a poorly controlled type II diabetic and has not had controlled hemoglobin A1c values at any time from  2 .     Past Weight Bearing, General Precautions     Right Lower Extremity Weight Bearing: Non Weight Bearing         Other position/activity restrictions: right AKA      Prior Level of Function:  ADL Assistance: Independent  Homemaking Assistance: Independent  Ambulation Assistance: Independent(wheelchair)  Transfer Assistance: Independent  Additional Comments: Pt reporting he was on TCU back in Feb 2019. Pt stating he was indep with ADL tasks at home. His wheelchair will fit through all his doorways at home. Subjective     Subjective: Pt seated in w/c upon arrival, agreeable to OT session. Pain:    8/10 pain given, Patient requested RN staff give him Morphine  Objective       Transfers  Stand Pivot Transfers: Contact guard assistance(squat pivot to/from mat in therapy gym)  Sit to stand: Supervision  Stand to sit: Supervision  Transfer Comments: to/from w/c       Balance  Sitting Balance: Modified independent   Edge of mat    Standing Balance: Modified independent (dynamic standing with ring toss, standing at edge of mat.)   Activity: At mat, Patient reported that his Legs were really starting to bother him. Functional Mobility  Functional - Mobility Device: Wheelchair  Assist Level: Modified independent   Functional Mobility Comments: Patient completed ambulation wheel chair mobility in unit and to/from apartment and down to 1st floor lobby. with x 3 rest breaks patient reported his LLE was bothering him. Additional Activities: In apartment with retrival of cup and transporting to microwave for warming up water for small meal preparation activity. Patient demonstrated task from wheel chair level. Education given on standing at microwave for safety with placing and taking item out of the microwave. Patient completed wheel chair mobility around room with clean up and organizing for improved IADLs.  Patient dropping paper on floor with patient requiring CGA for retrival of paper and discussion on Functional Mobility Training, Safety Education & Training, Self-Care / ADL, Endurance Training    Goals:  Patient goals : Improve my strength and become steadier     Short term goals  Time Frame for Short term goals: 1 week   Short term goal 1: Pt will tolerate standing 1 min with S while releasing 1 UE from walker to improve balance needed to manage clothing. Short term goal 2: Pt will complete sit pivot transfer using safe technique with SBA to improve safety when transferring to toilet. Short term goal 3: Pt will don shorts with Mod I to improve indep with getting dressed at home. Short term goal 4: Pt will preform IADL task from wheelchair level with mod I and no cues for safety to improve indep within home. Long term goals  Time Frame for Long term goals : 2 weeks   Long term goal 1: Pt will complete shower and dressing routine with mod I to return to prior level of independence. Long term goal 2: Pt will complete BUE HEP using handout with Mod I to improve UB strength to 5/5. If patient is discharged prior to progress note completion, this note is to serve as the discharge note with all goals being unmet unless indicated otherwise.

## 2019-04-27 NOTE — PROGRESS NOTES
Right 07/20/2016    LEG AMPUTATION BELOW KNEE Right 4/4/2019    I&D AND REVISION OF AMPUTATION RIGHT LEG performed by Tim Matthews MD at [address] Right 1/14/15    sole of foot I&D    ME DRAIN INFECT SHOULDER BURSA Left 8/18/2017    LEFT SHOULDER INCISION AND DRAINAGE performed by Tim Matthews MD at [facility] OFFICE/OUTPT [address] Right 9/20/2018    EXCISIONAL DEBRIDEMENT RIGHT BKA STUMP performed by Angus Veliz MD at [address] Right 1/16/15    2nd toe with wound vac applied    WISDOM TOOTH EXTRACTION  ? when       Restrictions/Precautions:  Fall Risk, Weight Bearing, General Precautions     Right Lower Extremity Weight Bearing: Non Weight Bearing              Other position/activity restrictions: right AKA        Prior Level of Function:  ADL Assistance: Independent  Homemaking Assistance: Independent  Ambulation Assistance: Independent(wheelchair)  Transfer Assistance: Independent  Additional Comments: Pt reporting he was on TCU back in Feb 2019. Pt stating he was indep with ADL tasks at home. His wheelchair will fit through all his doorways at home. Subjective:     Subjective: Pt very pleasnat and cooerpative. Motivated. Pain:   .       \"not bad\" - no pain behaviors     Social/Functional:  Lives With: Other (comment)(Roommate)  Type of Home: House  Home Layout: Two level, Able to Live on Main level with bedroom/bathroom  Home Access: Ramped entrance  Home Equipment: Wheelchair-manual, Rolling walker(wheelchair not in good shape)     Objective:       Transfers  Sit to Stand: Stand by assistance(done 6 times total )  Stand to sit: Stand by assistance                Balance  Comments: pt stood at scale to get weight per his request; pt did not hop up onto scale but put his foot on the scale at start of t-josh and sat directly to chair from it;   pt stood twice up to // bars for beanbag toss pt at light CGA to nearly SBA x 1  approx 3-4 minutes each time; pt reaching to his left side both times;  pt stood up to walker twice for right limb therex and standing tolerance; PTA at close SBA to light CGA x 1   All stands ~2-4 minutes each time  Propulsion 1  Propulsion: Manual  Level: Level Tile  Method: RUE;LUE  Level of Assistance: Supervision  Description/ Details: forward propulsion, thru doorways, backing into areas; parking w/c for activites   Distance: ~ 180 feet x 2 and <30 feet x 2                                    Exercises:  Exercises  Comments: standing right limb 4 -way hip ~20 reps each in 2 sets ; standing minisquats over left leg ~10 reps each ; for improved ROM and bloodflow to legs                                  Activity Tolerance:  Activity Tolerance: Patient Tolerated treatment well    Assessment: Body structures, Functions, Activity limitations: Decreased functional mobility , Decreased ADL status, Decreased strength, Decreased endurance, Decreased balance  Assessment: rec cont skilled therapy for improved functional mobility strength and endurance   Prognosis: Good          Discharge Recommendations:  Discharge Recommendations: Continue to assess pending progress    Patient Education:  Patient Education: 4 way hip     Equipment Recommendations:  Equipment Needed: Yes(w/c, has RW)    Safety:  Type of devices:  All fall risk precautions in place, Left in chair, Call light within reach, Chair alarm in place, Nurse notified    Plan:  Times per week: 5x/wk 90 min, 1x/wk 30 min  Current Treatment Recommendations: Strengthening, Transfer Training, Wheelchair Mobility Training, Patient/Caregiver Education & Training, Functional Mobility Training, Safety Education & Training, Positioning, Gait Training, Balance Training, Endurance Training, ADL/Self-care Training, Stair training, Home Exercise Program, Equipment Evaluation, Education, & procurement    Goals:  Patient goals : go home    Short term goals  Time Frame for Short term goals: 1 week  Short term goal 1: Pt to perform supine to/from sit at mod I to get in and out of bed  Short term goal 2: Pt to perform sit to/from stand at S with proper hand placement to rise from bed or chair  Short term goal 3: Pt to perform stand pivot with RW at SBA to get to and from bed and wheelchair  Short term goal 4: Pt to ambulate 30ft with RW at SBA to walk to and from restroom  Short term goal 5: Pt to propel wheelchair community distances and up and down 25 ft ramp at mod I for functional mobility    Long term goals  Time Frame for Long term goals : 2 weeks  Long term goal 1: Pt to perform sit to/from stand at mod I with proper hand placement to rise from bed or chair  Long term goal 2: Pt to perform stand pivot with RW at mod I to get to and from bed and wheelchair  Long term goal 3: Pt to ambulate 50ft with RW at mod I to walk to and from restroom  Long term goal 4: Pt to negotiate car transfer at S for transport to and from medical appts  If patient is discharged prior to progress note completion, this note is to serve as the discharge note with all goals being unmet unless indicated otherwise.

## 2019-04-28 LAB
GLUCOSE BLD-MCNC: 110 MG/DL (ref 70–108)
GLUCOSE BLD-MCNC: 110 MG/DL (ref 70–108)
GLUCOSE BLD-MCNC: 130 MG/DL (ref 70–108)
GLUCOSE BLD-MCNC: 135 MG/DL (ref 70–108)
GLUCOSE BLD-MCNC: 169 MG/DL (ref 70–108)

## 2019-04-28 PROCEDURE — 6370000000 HC RX 637 (ALT 250 FOR IP): Performed by: ORTHOPAEDIC SURGERY

## 2019-04-28 PROCEDURE — 6370000000 HC RX 637 (ALT 250 FOR IP): Performed by: PHYSICAL MEDICINE & REHABILITATION

## 2019-04-28 PROCEDURE — 1180000000 HC REHAB R&B

## 2019-04-28 PROCEDURE — 82948 REAGENT STRIP/BLOOD GLUCOSE: CPT

## 2019-04-28 RX ADMIN — SERTRALINE 150 MG: 100 TABLET, FILM COATED ORAL at 09:58

## 2019-04-28 RX ADMIN — FUROSEMIDE 20 MG: 20 TABLET ORAL at 09:58

## 2019-04-28 RX ADMIN — OXYCODONE AND ACETAMINOPHEN 2 TABLET: 5; 325 TABLET ORAL at 10:11

## 2019-04-28 RX ADMIN — VITAMIN D, TAB 1000IU (100/BT) 5000 UNITS: 25 TAB at 20:30

## 2019-04-28 RX ADMIN — INSULIN LISPRO 18 UNITS: 100 INJECTION, SOLUTION INTRAVENOUS; SUBCUTANEOUS at 12:38

## 2019-04-28 RX ADMIN — FAMOTIDINE 20 MG: 20 TABLET ORAL at 09:58

## 2019-04-28 RX ADMIN — INSULIN LISPRO 18 UNITS: 100 INJECTION, SOLUTION INTRAVENOUS; SUBCUTANEOUS at 17:29

## 2019-04-28 RX ADMIN — INSULIN GLARGINE 55 UNITS: 100 INJECTION, SOLUTION SUBCUTANEOUS at 10:00

## 2019-04-28 RX ADMIN — POTASSIUM CHLORIDE 20 MEQ: 20 TABLET, EXTENDED RELEASE ORAL at 09:58

## 2019-04-28 RX ADMIN — OXYCODONE AND ACETAMINOPHEN 2 TABLET: 5; 325 TABLET ORAL at 15:39

## 2019-04-28 RX ADMIN — MIRTAZAPINE 15 MG: 15 TABLET, FILM COATED ORAL at 20:30

## 2019-04-28 RX ADMIN — VITAMIN D, TAB 1000IU (100/BT) 5000 UNITS: 25 TAB at 09:58

## 2019-04-28 RX ADMIN — INSULIN GLARGINE 55 UNITS: 100 INJECTION, SOLUTION SUBCUTANEOUS at 20:30

## 2019-04-28 ASSESSMENT — PAIN DESCRIPTION - FREQUENCY
FREQUENCY: CONTINUOUS

## 2019-04-28 ASSESSMENT — PAIN DESCRIPTION - PAIN TYPE
TYPE: ACUTE PAIN

## 2019-04-28 ASSESSMENT — PAIN SCALES - GENERAL
PAINLEVEL_OUTOF10: 0
PAINLEVEL_OUTOF10: 9
PAINLEVEL_OUTOF10: 9
PAINLEVEL_OUTOF10: 8
PAINLEVEL_OUTOF10: 3
PAINLEVEL_OUTOF10: 3
PAINLEVEL_OUTOF10: 7

## 2019-04-28 ASSESSMENT — PAIN DESCRIPTION - ORIENTATION
ORIENTATION: RIGHT

## 2019-04-28 ASSESSMENT — PAIN DESCRIPTION - LOCATION
LOCATION: LEG

## 2019-04-28 ASSESSMENT — PAIN DESCRIPTION - ONSET: ONSET: ON-GOING

## 2019-04-28 ASSESSMENT — PAIN DESCRIPTION - DESCRIPTORS
DESCRIPTORS: SHARP

## 2019-04-28 NOTE — PLAN OF CARE
Problem: Pain:  Goal: Pain level will decrease  Description  Pt report pain at 10 on scale. Pt states oral medication helping to achieve pain goal of a 3  Outcome: Ongoing    Problem: Falls - Risk of:  Goal: Will remain free from falls  Description  Pt using call light appropriately to call for assistance with ambulation to the bathroom and to chair. Pt is also compliant with use of non-skid slippers. Pt reports understanding of fall prevention when discussed. Outcome: Ongoing      Problem: Falls - Risk of:  Goal: Absence of physical injury  Description  Absence of physical injury  Outcome: Ongoing     Problem: Risk for Impaired Skin Integrity  Goal: Tissue integrity - skin and mucous membranes  Description  Structural intactness and normal physiological function of skin and  mucous membranes. Outcome: Ongoing    Problem: Serum Glucose Level - Abnormal:  Goal: Ability to maintain appropriate glucose levels will improve  Description  Ability to maintain appropriate glucose levels will improve  Outcome: Ongoing        Problem: Mood - Altered:  Goal: Mood stable  Description  Flat affect. Outcome: Ongoing   Care plan reviewed with patient. Patient verbalize understanding of the plan of care and contribute to goal setting.

## 2019-04-29 LAB
ALBUMIN SERPL-MCNC: 3.8 G/DL (ref 3.5–5.1)
ALP BLD-CCNC: 72 U/L (ref 38–126)
ALT SERPL-CCNC: < 5 U/L (ref 11–66)
ANION GAP SERPL CALCULATED.3IONS-SCNC: 13 MEQ/L (ref 8–16)
AST SERPL-CCNC: 9 U/L (ref 5–40)
BILIRUB SERPL-MCNC: 0.2 MG/DL (ref 0.3–1.2)
BUN BLDV-MCNC: 20 MG/DL (ref 7–22)
CALCIUM SERPL-MCNC: 9.5 MG/DL (ref 8.5–10.5)
CHLORIDE BLD-SCNC: 99 MEQ/L (ref 98–111)
CO2: 26 MEQ/L (ref 23–33)
CREAT SERPL-MCNC: 0.7 MG/DL (ref 0.4–1.2)
ERYTHROCYTE [DISTWIDTH] IN BLOOD BY AUTOMATED COUNT: 15.8 % (ref 11.5–14.5)
ERYTHROCYTE [DISTWIDTH] IN BLOOD BY AUTOMATED COUNT: 47.2 FL (ref 35–45)
GFR SERPL CREATININE-BSD FRML MDRD: > 90 ML/MIN/1.73M2
GLUCOSE BLD-MCNC: 114 MG/DL (ref 70–108)
GLUCOSE BLD-MCNC: 138 MG/DL (ref 70–108)
GLUCOSE BLD-MCNC: 161 MG/DL (ref 70–108)
GLUCOSE BLD-MCNC: 209 MG/DL (ref 70–108)
GLUCOSE BLD-MCNC: 84 MG/DL (ref 70–108)
HCT VFR BLD CALC: 33.8 % (ref 42–52)
HEMOGLOBIN: 10.9 GM/DL (ref 14–18)
MCH RBC QN AUTO: 27.3 PG (ref 26–33)
MCHC RBC AUTO-ENTMCNC: 32.2 GM/DL (ref 32.2–35.5)
MCV RBC AUTO: 84.7 FL (ref 80–94)
PLATELET # BLD: 337 THOU/MM3 (ref 130–400)
PMV BLD AUTO: 8.7 FL (ref 9.4–12.4)
POTASSIUM SERPL-SCNC: 4.6 MEQ/L (ref 3.5–5.2)
RBC # BLD: 3.99 MILL/MM3 (ref 4.7–6.1)
SODIUM BLD-SCNC: 138 MEQ/L (ref 135–145)
TOTAL PROTEIN: 7.7 G/DL (ref 6.1–8)
VITAMIN D 25-HYDROXY: 16 NG/ML (ref 30–100)
WBC # BLD: 11 THOU/MM3 (ref 4.8–10.8)

## 2019-04-29 PROCEDURE — 36415 COLL VENOUS BLD VENIPUNCTURE: CPT

## 2019-04-29 PROCEDURE — 97530 THERAPEUTIC ACTIVITIES: CPT

## 2019-04-29 PROCEDURE — 1180000000 HC REHAB R&B

## 2019-04-29 PROCEDURE — 6370000000 HC RX 637 (ALT 250 FOR IP): Performed by: PHYSICAL MEDICINE & REHABILITATION

## 2019-04-29 PROCEDURE — 2580000003 HC RX 258: Performed by: PHYSICAL MEDICINE & REHABILITATION

## 2019-04-29 PROCEDURE — 2709999900 HC NON-CHARGEABLE SUPPLY

## 2019-04-29 PROCEDURE — 97542 WHEELCHAIR MNGMENT TRAINING: CPT

## 2019-04-29 PROCEDURE — 6370000000 HC RX 637 (ALT 250 FOR IP): Performed by: ORTHOPAEDIC SURGERY

## 2019-04-29 PROCEDURE — 80053 COMPREHEN METABOLIC PANEL: CPT

## 2019-04-29 PROCEDURE — 97535 SELF CARE MNGMENT TRAINING: CPT

## 2019-04-29 PROCEDURE — 97110 THERAPEUTIC EXERCISES: CPT

## 2019-04-29 PROCEDURE — 82306 VITAMIN D 25 HYDROXY: CPT

## 2019-04-29 PROCEDURE — 85027 COMPLETE CBC AUTOMATED: CPT

## 2019-04-29 PROCEDURE — 82948 REAGENT STRIP/BLOOD GLUCOSE: CPT

## 2019-04-29 PROCEDURE — 97116 GAIT TRAINING THERAPY: CPT

## 2019-04-29 RX ORDER — SODIUM CHLORIDE 0.9 % (FLUSH) 0.9 %
10 SYRINGE (ML) INJECTION 2 TIMES DAILY
Status: DISCONTINUED | OUTPATIENT
Start: 2019-04-29 | End: 2019-05-01 | Stop reason: HOSPADM

## 2019-04-29 RX ORDER — OXYCODONE AND ACETAMINOPHEN 7.5; 325 MG/1; MG/1
2 TABLET ORAL EVERY 4 HOURS PRN
Status: DISCONTINUED | OUTPATIENT
Start: 2019-04-29 | End: 2019-05-01 | Stop reason: HOSPADM

## 2019-04-29 RX ORDER — OXYCODONE AND ACETAMINOPHEN 7.5; 325 MG/1; MG/1
1 TABLET ORAL EVERY 4 HOURS PRN
Status: DISCONTINUED | OUTPATIENT
Start: 2019-04-29 | End: 2019-05-01 | Stop reason: HOSPADM

## 2019-04-29 RX ADMIN — VITAMIN D, TAB 1000IU (100/BT) 5000 UNITS: 25 TAB at 08:49

## 2019-04-29 RX ADMIN — MIRTAZAPINE 15 MG: 15 TABLET, FILM COATED ORAL at 22:50

## 2019-04-29 RX ADMIN — INSULIN GLARGINE 55 UNITS: 100 INJECTION, SOLUTION SUBCUTANEOUS at 08:51

## 2019-04-29 RX ADMIN — INSULIN LISPRO 18 UNITS: 100 INJECTION, SOLUTION INTRAVENOUS; SUBCUTANEOUS at 08:51

## 2019-04-29 RX ADMIN — INSULIN LISPRO 18 UNITS: 100 INJECTION, SOLUTION INTRAVENOUS; SUBCUTANEOUS at 12:51

## 2019-04-29 RX ADMIN — VITAMIN D, TAB 1000IU (100/BT) 5000 UNITS: 25 TAB at 22:50

## 2019-04-29 RX ADMIN — FUROSEMIDE 20 MG: 20 TABLET ORAL at 08:49

## 2019-04-29 RX ADMIN — CYCLOBENZAPRINE HYDROCHLORIDE 10 MG: 10 TABLET, FILM COATED ORAL at 02:08

## 2019-04-29 RX ADMIN — POTASSIUM CHLORIDE 20 MEQ: 20 TABLET, EXTENDED RELEASE ORAL at 08:49

## 2019-04-29 RX ADMIN — OXYCODONE HYDROCHLORIDE AND ACETAMINOPHEN 2 TABLET: 7.5; 325 TABLET ORAL at 22:52

## 2019-04-29 RX ADMIN — OXYCODONE AND ACETAMINOPHEN 2 TABLET: 5; 325 TABLET ORAL at 02:03

## 2019-04-29 RX ADMIN — INSULIN GLARGINE 55 UNITS: 100 INJECTION, SOLUTION SUBCUTANEOUS at 22:45

## 2019-04-29 RX ADMIN — CYCLOBENZAPRINE HYDROCHLORIDE 10 MG: 10 TABLET, FILM COATED ORAL at 07:14

## 2019-04-29 RX ADMIN — SENNOSIDES 17.2 MG: 8.6 TABLET, FILM COATED ORAL at 22:50

## 2019-04-29 RX ADMIN — DOCUSATE SODIUM 100 MG: 100 CAPSULE, LIQUID FILLED ORAL at 08:49

## 2019-04-29 RX ADMIN — Medication 10 ML: at 09:22

## 2019-04-29 RX ADMIN — SERTRALINE 150 MG: 100 TABLET, FILM COATED ORAL at 08:48

## 2019-04-29 RX ADMIN — FAMOTIDINE 20 MG: 20 TABLET ORAL at 08:49

## 2019-04-29 RX ADMIN — OXYCODONE AND ACETAMINOPHEN 2 TABLET: 5; 325 TABLET ORAL at 07:14

## 2019-04-29 RX ADMIN — OXYCODONE AND ACETAMINOPHEN 2 TABLET: 5; 325 TABLET ORAL at 12:53

## 2019-04-29 ASSESSMENT — PAIN DESCRIPTION - FREQUENCY: FREQUENCY: INTERMITTENT

## 2019-04-29 ASSESSMENT — PAIN SCALES - GENERAL
PAINLEVEL_OUTOF10: 8
PAINLEVEL_OUTOF10: 0
PAINLEVEL_OUTOF10: 8
PAINLEVEL_OUTOF10: 10
PAINLEVEL_OUTOF10: 8
PAINLEVEL_OUTOF10: 8
PAINLEVEL_OUTOF10: 5
PAINLEVEL_OUTOF10: 8
PAINLEVEL_OUTOF10: 5
PAINLEVEL_OUTOF10: 8
PAINLEVEL_OUTOF10: 0
PAINLEVEL_OUTOF10: 0

## 2019-04-29 ASSESSMENT — PAIN DESCRIPTION - PAIN TYPE
TYPE: ACUTE PAIN

## 2019-04-29 ASSESSMENT — PAIN DESCRIPTION - DESCRIPTORS
DESCRIPTORS: ACHING
DESCRIPTORS: SHARP;ACHING

## 2019-04-29 ASSESSMENT — PAIN DESCRIPTION - ORIENTATION
ORIENTATION: RIGHT
ORIENTATION: RIGHT
ORIENTATION: RIGHT;ANTERIOR
ORIENTATION: RIGHT

## 2019-04-29 ASSESSMENT — PAIN DESCRIPTION - LOCATION
LOCATION: LEG

## 2019-04-29 NOTE — PROGRESS NOTES
[facility]    Time In: 1330  Time Out: 1400  Timed Code Treatment Minutes: 30 Minutes  Minutes: 30          Date: 2019  Patient Name: Amanda Huang,  Gender:  male        MRN: [id number]  : 1970  (44 y.o.)     Referring Practitioner: LIDIA Golden MD  Diagnosis: above knee amputation of right lower extremity  Treatment Diagnosis: R AKA  Additional Pertinent Hx: HX:  BKA with wound over fibula. Underwent multiple debridements with no success. Pt admitted for AKA, performed 2019. To [facility] on      Past Medical History:   Diagnosis Date    Bipolar 2 disorder [facility])     previously followed with Dr. Gisella Alvarez and Eli Storm in [facility] Diabetes mellitus type 2, uncontrolled ([address]     HgbA1c on 2019 was 9.1.     Diabetic polyneuropathy ([address]     Diabetic ulcer of right foot associated with type 2 diabetes mellitus ([address] 12/10/2015    Essential hypertension     \"never been on b/p medication that I know of\"    GERD (gastroesophageal reflux disease)     Hammer toe of left foot     Heart murmur     denies any chest pain or palpitations    History of tobacco abuse     Hx of AKA (above knee amputation), right ([address] 2019    Dr. Reuben Orozco edema, diabetic, bilateral ([address] 2018    Dr. 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 Cues for slowed aiyana at times for safety. Pt. motivated and requests to complete further ambulation. Distance: 77' x 1, 35' x 1, 25' x 1         Balance  Comments: Pt. completed static standing at RW with varrying amounts of UE support. Pt. with difficulty maintaining balance with no UE support with multiple LOBs to R side requiring assistance to correct. Pt. completed multiple trials throughout session. Propulsion 1  Propulsion: Manual  Level: Level Tile  Method: RUE;LUE  Level of Assistance: Modified independent  Description/ Details: Good propulsion speed and manueverability. Distance: 150' x 1, 25' x 2      Exercises:  Exercises  Comments: none       Activity Tolerance:  Activity Tolerance: Patient Tolerated treatment well;Patient limited by fatigue;Patient limited by endurance    Assessment: Body structures, Functions, Activity limitations: Decreased functional mobility , Decreased ADL status, Decreased strength, Decreased endurance, Decreased balance  Assessment: Pt. tolerated session well. Pt. motivated for therapy session requesting to complete further ambulation this session. Pt. demos decreased pain with mobility this date. Pt. would benefit from continued skilled PT to increase strength and endurance to enhance functional mobility  Prognosis: Good          Discharge Recommendations:  Discharge Recommendations: Continue to assess pending progress    Patient Education:  Patient Education: gait, transfers, ambulation, balance    Equipment Recommendations:  Equipment Needed: Yes(w/c, has RW)    Safety:  Type of devices:  All fall risk precautions in place, Left in chair, Call light within reach, Nurse notified, Gait belt    Plan:  Times per week: 5x/wk 90 min, 1x/wk 30 min  Current Treatment Recommendations: Strengthening, Transfer Training, Wheelchair Mobility Training, Patient/Caregiver Education & Training, Functional Mobility Training, Safety Education & Training, Positioning,

## 2019-04-29 NOTE — PLAN OF CARE
Care plan reviewed with patient and verbalize understanding of the plan of care and contribute to goal setting. Problem: Pain:  Description  Pain management should include both nonpharmacologic and pharmacologic interventions. Goal: Pain level will decrease  Description  Pain level will decrease  Outcome: Met This Shift  Note:   With meds     Problem: Pain:  Description  Pain management should include both nonpharmacologic and pharmacologic interventions.   Goal: Control of acute pain  Description  Control of acute pain  Outcome: Met This Shift  Note:   With meds     Problem: Falls - Risk of:  Goal: Will remain free from falls  Description  Will remain free from falls  Outcome: Met This Shift  Note:   Uses call light     Problem: Falls - Risk of:  Goal: Absence of physical injury  Description  Absence of physical injury  Outcome: Met This Shift  Note:   Uses call light

## 2019-04-29 NOTE — PROGRESS NOTES
assistance         Ambulation 1  Surface: level tile  Device: Rolling Walker  Assistance: Stand by assistance;Contact guard assistance(Close SBA-CGA)  Quality of Gait: Pt. wearing shoe. Decreased aiyana. Pt. steady with no LOB. Pt. fatigues easily and requests to sit at this distance. Distance: 36' x 1         Balance  Comments: Pt. sat on dynadisk on edge of mat approximately 15' total while completed dynamic balance activity. Pt. reaching outside CRISTO throughout. Slightly unsteady but able to correct all LOBs. Pt. also completed standing dynamic balance activity with single UE support on walker approximately 4' x 2. All completed to improve balance and trunk strength for improved function at home. Propulsion 1  Propulsion: Manual  Level: Level Tile  Method: RUE;LUE  Level of Assistance: Modified independent;Contact guard assistance(CGA for ramp.)  Description/ Details: Good propulsion speed and manueverability. Distance: 150' x 1, Community distance (>500') including 25' ramp    Exercises:  Exercises  Comments: Seated B LE ther ex 10x each consisting of marches, hip abd/add AROM, glute sets, L LE long arc quads, ankle pumps, resisted hip abd/add, and resisted HS curls. Pt. also completed standing R LE 4 way hip exercises all to increase strength and improve functional mobility       Activity Tolerance:  Activity Tolerance: Patient Tolerated treatment well    Assessment: Body structures, Functions, Activity limitations: Decreased functional mobility , Decreased ADL status, Decreased strength, Decreased endurance, Decreased balance  Assessment: Pt. tolerated session well. Pt. motivated for therapy. Pt. continues to report pain in R anterior thigh throughout session. Pt. would benefit from continued skilled PT for increased strength and balance to enhance functional mobility.    Prognosis: Good          Discharge Recommendations:  Discharge Recommendations: Continue to assess pending progress    Patient Education:  Patient Education: Gait, transfers, ther ex     Equipment Recommendations:  Equipment Needed: Yes(w/c, has RW)    Safety:  Type of devices: All fall risk precautions in place, Left in chair, Call light within reach, Nurse notified, Gait belt    Plan:  Times per week: 5x/wk 90 min, 1x/wk 30 min  Current Treatment Recommendations: Strengthening, Transfer Training, Wheelchair Mobility Training, Patient/Caregiver Education & Training, Functional Mobility Training, Safety Education & Training, Positioning, Gait Training, Balance Training, Endurance Training, ADL/Self-care Training, Stair training, Home Exercise Program, Equipment Evaluation, Education, & procurement    Goals:  Patient goals : go home    Short term goals  Time Frame for Short term goals: 1 week  Short term goal 1: Pt to perform supine to/from sit at mod I to get in and out of bed  Short term goal 2: Pt to perform sit to/from stand at S with proper hand placement to rise from bed or chair  Short term goal 3: Pt to perform stand pivot with RW at SBA to get to and from bed and wheelchair  Short term goal 4: Pt to ambulate 30ft with RW at SBA to walk to and from restroom  Short term goal 5: Pt to propel wheelchair community distances and up and down 25 ft ramp at mod I for functional mobility    Long term goals  Time Frame for Long term goals : 2 weeks  Long term goal 1: Pt to perform sit to/from stand at mod I with proper hand placement to rise from bed or chair  Long term goal 2: Pt to perform stand pivot with RW at mod I to get to and from bed and wheelchair  Long term goal 3: Pt to ambulate 50ft with RW at mod I to walk to and from restroom  Long term goal 4: Pt to negotiate car transfer at S for transport to and from medical appts  If patient is discharged prior to progress note completion, this note is to serve as the discharge note with all goals being unmet unless indicated otherwise.

## 2019-04-29 NOTE — PROGRESS NOTES
Nutrition Assessment    Type and Reason for Visit: Reassess(PO Monitor)    Nutrition Recommendations:  Recommend a Multivitamin w/minerals daily to aid in wound healing. *Started Ensure High Protein TID. *Continue current diet and Pascual TID. Nutrition Assessment: Pt improving from a nutritional standpoint AEB pt report of good appetite and intake of meals over the past week and consuming x1 Pascual daily. Remains at risk for further nutritional compromise r/t increased nutrient needs to aid in wound healing and hx uncontrolled DM. Provided pt with Heart Healthy Carbohyrate Controlled diet education today and provided patient with Carbohydrate Counting booklet today per request. Claudia Rivero all questions at this time. Will continue Pascual TID. Started Ensure High Protein TID per pt request. Recommend a Multivitamin w/minerals daily to aid in wound healing. Malnutrition Assessment:  · Malnutrition Status: No malnutrition    Nutrition Risk Level: Moderate    Nutrient Needs:  · Estimated Daily Total Kcal: 8463-9623 kcal/day (15-18 kcal/kg - 96.8 kg on 4/29)  · Estimated Daily Protein (g):  gm pro/d (1.7-2 gm pro/kg IBW)(54 kg)    Nutrition Diagnosis:   · Problem: Increased nutrient needs  · Etiology: related to Increased demand for energy/nutrients     Signs and symptoms:  as evidenced by Presence of wounds    Objective Information:  · Nutrition-Focused Physical Findings: pt with new AKA on 4/18/19; pt denies N/V/D/C- last BM x1 on 4/28.  Meds include: Remeron, Colace, Lasix, Senna, Vitamin D, Lantus, & Humalog  · Wound Type: Surgical Wound(s/p right AKA on 4/18/19)  · Current Nutrition Therapies:  · Oral Diet Orders: Carb Control 4 Carbs/Meal   · Oral Diet intake: %  · Oral Nutrition Supplement (ONS) Orders: Wound Healing Oral Supplement, Low Calorie High Protein Supplement(Orange Pascual TID; Ensure High Protein TID (Vanilla) per pt request)  · ONS intake: (pt reports consuming x1 Pascual daily)  · Anthropometric Measures:  · Ht: 5' 6\" (167.6 cm)   · Current Body Wt: 213 lb 7 oz (96.8 kg)(4/29; no edema noted)  · Admission Body Wt: 211 lb 2 oz (95.8 kg)(4/24; no edema noted)  · Usual Body Wt: 222 lb (100.7 kg)(Stated per patient)  · % Weight Change:  no weight loss in one week since admission  · Ideal Body Wt: 119 lb (54 kg)(Adjusted for AKA), % Ideal Body 187%(Adjusted for AKA)  · BMI Classification: BMI 35.0 - 39.9 Obese Class II(37.9)    Nutrition Interventions:   Continue current diet, Continue current ONS, Vitamin Supplement(Started Ensure High Protein TID. Continue Pascual TID. Recommend a Multivitamin w/minerals daily to aid in wound healing.)  Continued Inpatient Monitoring, Education Completed, Coordination of Care(Completed Heart Healthy Carbohydrate Controlled diet education on 4/29/19 and provided patient with Carbohydrate Counting Booklet. RD contact information provided.  Encouraged lean protein sources and good blood sugar control to aid in wound healing.)    Nutrition Evaluation:   · Evaluation: Progressing toward goals   · Goals: Pt to consume 75% or more most meals during LOS    · Monitoring: Nutrition Progression, Meal Intake, Supplement Intake, Skin Integrity, Wound Healing, Weight, Pertinent Labs      Electronically signed by Mati Addison RD, LD on 4/29/19 at 11:37 AM    Contact Number: [phone]

## 2019-04-29 NOTE — PROGRESS NOTES
[address]  DAILY NOTE    Time:  Time In: 1000  Time Out: 1130  Timed Code Treatment Minutes: 90 Minutes  Minutes: 90          Date: 2019  Patient Name: Barbra Pablo,   Gender: male      Room: -68/068-A  MRN: [id number]  : 1970  (44 y.o.)  Referring Practitioner: Dr. Marcelo Jones   Diagnosis: Above knee amputation of right lower extremity   Additional Pertinent Hx: 44 y.o. 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 He was admitted 2019 for for complaint of pain secondary to a wound in his prior below knee amputation. He originally had a RIGHT below-knee amputation for persistent osteomyelitis in his RIGHT foot on 2016 and subsequently developed a poorly healing wound and osteomyelitis with Streptococcus agalactiae. On 2019 he underwent incision and drainage with saucerization of the RIGHT tibia and fibula by Dr. Katie Tapia and a wound VAC was placed and he was discharged to home with home health with IV Rocephin. He had low-grade temperatures and leukocytosis noted at home by the home health nurse and he was referred to come back into the emergency department for evaluation. On 2019 he was agreeable to having a RIGHT above-knee amputation by Dr. Katie Tapia. The patient is a poorly controlled type II diabetic and has not had controlled hemoglobin A1c values at any time from  2 .     Past Weight Bearing, General Precautions     Right Lower Extremity Weight Bearing: Non Weight Bearing              Other position/activity restrictions: right AKA        Prior Level of Function:  ADL Assistance: Independent  Homemaking Assistance: Independent  Ambulation Assistance: Independent(wheelchair)  Transfer Assistance: Independent  Additional Comments: Pt reporting he was on TCU back in Feb 2019. Pt stating he was indep with ADL tasks at home. His wheelchair will fit through all his doorways at home. Subjective     Subjective: Patient seated in wheel chair       Pain:  Pain Assessment  Patient Currently in Pain: Yes(7/10)       Objective        ADL  Toileting: Stand by assistance(seated with urination)     Balance  Sitting Balance: Modified independent     Standing Balance: Supervision   At washing machine for setting dials         Functional Mobility  Functional - Mobility Device: Wheelchair  Assist Level: Modified independent   Functional Mobility Comments: patient completed functional mobility in wheel chair to/from apratment and therapy gym x 2 events due to needing to use the bathroom. Fair pace good pathfinding. Patient completed wheel chair mobility for improved functional mobility with activities. Patient was agreeable to wheel chair mobility to/from 2nd floor garden. Patietn was fatiguing and therapist completed wheel chair mobility back to room. Patient completed wheel chair mobility needed for improved B UE  strengthening for endurance building needed for improved functional mobility in wheel chair. Patient required some assistance for going up and down ramps due to leg fatiguing. Additional Activities: Patient completed seated in wheel chiar laundry activity of loading laundry and getting laundry washing and loading laundry into dryer for needed for improved IADLs.    Patient completed small meal preparation with  with retrival and transporting items from wheel chair level with min A for demonstration of how to use machine needed for improved IADLs       Comment: Patient completed B UE AROM exercises with 3# dowel naty and # 3 weight and #2 pound medicine ball in all joints and planes needed for improved strengthening and endurance. Education on form and technique       Groomin - Able to perform 3-4 tasks with touching help(seated with electric shaver for head with assistance for back of head)                                                                                                                                                               Dressing-Upper: 5 - Requires setup/supervision/cues and/or requires assist with presthesis/brace only                                                                                                                                                              Toiletin - Requires setup/supervision/cues(seated in wheel chair with use of urinal. )                                                                                 Activity Tolerance:   Patient tolerated well    Assessment:     Performance deficits / Impairments: Decreased ADL status, Decreased safe awareness, Decreased balance, Decreased endurance, Decreased strength  Prognosis: Good    Discharge Recommendations:  Discharge Recommendations: Home with assist PRN(UB strengthening HEP )    Patient Education:  Patient Education: Safety in kitchen with transporting items, exercise form and technique,    Equipment Recommendations: Other: Pt has a tub transfer bench. Uses a standard toilet, but denies needs. Recommend grab bar near toilet instead of using towel bar. Safety:  Safety Devices in place: Yes  Type of devices:  All fall risk precautions in place, Call light within reach, Left in chair       Plan:  Times per week: 5x/wk for 90 min and 1x/wk for 30 min    Current Treatment Recommendations: Patient/Caregiver Education & Training, Strengthening, Balance Training, Functional Mobility Training, Safety Education & Training, Self-Care / ADL, Endurance Training    Goals:  Patient goals : Improve my strength and become steadier     Short term goals  Time Frame for Short term goals: 1 week   Short term goal 1: Pt will tolerate standing 1 min with S while releasing 1 UE from walker to improve balance needed to manage clothing. Short term goal 2: Pt will complete sit pivot transfer using safe technique with SBA to improve safety when transferring to toilet. Short term goal 3: Pt will don shorts with Mod I to improve indep with getting dressed at home. Short term goal 4: Pt will preform IADL task from wheelchair level with mod I and no cues for safety to improve indep within home. Long term goals  Time Frame for Long term goals : 2 weeks   Long term goal 1: Pt will complete shower and dressing routine with mod I to return to prior level of independence. Long term goal 2: Pt will complete BUE HEP using handout with Mod I to improve UB strength to 5/5. If patient is discharged prior to progress note completion, this note is to serve as the discharge note with all goals being unmet unless indicated otherwise.

## 2019-04-29 NOTE — DISCHARGE INSTR - COC
Continuity of Care Form    Patient Name: Amanda Huang   :  1970  MRN:  [id number]    Admit date:  2019  Discharge date:  ***    Code Status Order: Full Code   Advance Directives:   Advance Care Flowsheet Documentation     Date/Time Healthcare Directive Type of Healthcare Directive Copy in [address] Agent's Name Healthcare Agent's Phone Number    [phone]  Yes, patient has an advance directive for healthcare treatment  Living will;Durable power of  for health care  Yes, copy in chart  Adult Children  [facility] [employer] [facility]  doesn't have their phone numbers          Admitting Physician:  Bandar Pope MD  PCP: No primary care provider on file.     Discharging Nurse: [facility] Unit/Room#: 7E-68/068-A  Discharging Unit Phone Number: ***    Emergency Contact:   Extended Emergency Contact Information  Primary Emergency Contact: Frantz Rand   [place] [address] [address]  Phone: [phone]  Relation: Aunt/Uncle    Past Surgical History:  Past Surgical History:   Procedure Laterality Date    ABSCESS DRAINAGE Right     foot    AMPUTATION ABOVE KNEE Right 2019    RIGHT ABOVE KNEE AMPUTATION performed by Di Monroy MD at [address] Right 2016    I & D    INCISION AND DRAINAGE Right 2019    I&D RIGHT STUMP performed by Di Monroy MD at [address] [facility] Right 2016    LEG AMPUTATION BELOW KNEE Right 2019    I&D AND REVISION OF AMPUTATION RIGHT LEG performed by Di Monroy MD at [address] Right 1/14/15    sole of foot I&D    MT DRAIN INFECT SHOULDER BURSA Left 2017    LEFT SHOULDER INCISION AND DRAINAGE performed by Di Monroy MD at [address] OFFICE/OUTPT [facility] Right 2018    EXCISIONAL DEBRIDEMENT RIGHT BKA STUMP performed by Lynna Claude, MD at [address] [place] [address] Drive Right 1/16/15    2nd toe with wound vac applied    WISDOM TOOTH EXTRACTION  ? when       Immunization History:   Immunization History   Administered Date(s) Administered    Influenza Virus Vaccine 10/06/2014, 10/01/2017    PPD Test 09/22/2018, 09/29/2018    Pneumococcal Conjugate 7-valent 10/15/2014    Pneumococcal Polysaccharide ([id number]) 10/06/2014    Rabies 11/16/2015, 11/19/2015, 11/23/2015, 11/30/2015    Rabies Immune Globulin 11/16/2015       Active Problems:  Patient Active Problem List   Diagnosis Code    Status post below knee amputation of right lower extremity ([facility] Z89.511    Gait disturbance R26.9    Sebaceous cyst L72.3    Uncontrolled type 2 diabetes mellitus with hyperglycemia, with long-term current use of insulin ([facility]) E11.65, Z79.4    Severe bipolar II disorder, recent episode major depressive, remission ([facility]) F31.81    Bipolar 1 disorder ([facility]) F31.9    Leukocytosis D72.829    Hyponatremia E87.1    Normocytic anemia D64.9    Obesity (BMI 30-39. 9) E66.9    History of noncompliance with medical treatment Z91.19    Essential hypertension I10    Tobacco dependence F17.200    History of osteomyelitis Z87.39    Gastroesophageal reflux disease without esophagitis K21.9    History of marijuana use Z87.898    Physical debility R53.81    Diabetic ulcer of left fifth toe ([facility]) E11.621, L97.529    Cellulitis of fifth toe, left L03.032    Cellulitis of right lower extremity L03.115    Anemia D64.9    DM type 2, uncontrolled, with lower extremity ulcer ([facility]) E11.622, E11.65, L97.909    Weakness R53.1    Infective myositis of right lower leg M60.061    Pyogenic inflammation of bone ([facility]) M86.9    Cellulitis and abscess of leg L03.119, L02.419    Infection of above knee amputation stump of right leg ([facility]) T87.43    Above knee amputation of right lower extremity ([facility] Z89.611       Isolation/Infection:   Isolation          No Isolation            Nurse Assessment:  Last Vital Signs: BP (!) 119/56 {Done Not Done TKAK:795498940}    Treatments at the Time of Hospital Discharge:   Respiratory Treatments: ***  Oxygen Therapy:  {Therapy; copd oxygen:08723}  Ventilator:    {[facility] Vent NZQD:377296361}    Rehab Therapies: {THERAPEUTIC INTERVENTION:1828944492}  Weight Bearing Status/Restrictions: {[facility] Weight Bearin}  Other Medical Equipment (for information only, NOT a DME order):  {EQUIPMENT:516294033}  Other Treatments: ***    Patient's personal belongings (please select all that are sent with patient):  {[facility] DME Belongings:194174072}    RN SIGNATURE:  {Esignature:310541142}    CASE MANAGEMENT/SOCIAL WORK SECTION    Inpatient Status Date: ***    Readmission Risk Assessment Score:  Readmission Risk              Risk of Unplanned Readmission:        40           Discharging to Facility/ Agency   · Name:   · Address:  · Phone:  · Fax:    Dialysis Facility (if applicable)   · Name:  · Address:  · Dialysis Schedule:  · Phone:  · Fax:    / signature: {Esignature:664879202}    PHYSICIAN SECTION    Prognosis: {Prognosis:5957738477}    Condition at Discharge: [address] [place] [address] Patient Condition:221107711}    Rehab Potential (if transferring to Rehab): {Prognosis:6008808281}    Recommended Labs or Other Treatments After Discharge: ***    Physician Certification: I certify the above information and transfer of Rox Coil  is necessary for the continuing treatment of the diagnosis listed and that he requires {Admit to Appropriate Level of Care:28878} for {GREATER/LESS:815662543} 30 days.      Update Admission H&P: {[facility] DME Changes in TRDJF:373955399}    PHYSICIAN SIGNATURE:  {Esignature:505663491}

## 2019-04-29 NOTE — PROGRESS NOTES
Assessed patient PICC dressing noticed PICC stat loc uncovered. Encouraged patient full dressing change needed pt refused. Stating PICC will be removed on Wednesday May 1st.  and dressing was changed on Friday 4/26/19. Site covered with new transparent dressing over stat loc.

## 2019-04-29 NOTE — PROGRESS NOTES
[facility]  Recreational Therapy  Daily Note  [address]    Time Spent with Patient: 60 minutes    Date:  4/29/2019       Patient Name: Maykel Leger      MRN: [id number]      YOB: 1970 (44 y.o.)       Gender: male  Diagnosis: Above knee amputation of right lower extremity   Referring Practitioner: Dr. Honey Royal     RESTRICTIONS/PRECAUTIONS:  Restrictions/Precautions: Fall Risk, Weight Bearing, General Precautions  Vision: Within Functional Limits  Hearing: Within functional limits    PAIN: no c/o pain    SUBJECTIVE:  \"I guess\"    OBJECTIVE:  Pt in w/c upon arrival. Had to convince pt to follow through and join group on TCU to paint - pt had flat affect. Pt picked out a saying and able to complete project independently. Pt stated that he felt his saying fit his life and situation well. Wheeled pt back to his room - all comfort measures given. Pt appreciative for inviting him.      FIMS:  Social Interaction: 6 - Patient requires medication for mood and/or effect    Patient Education  New Education Provided: Importance of Leisure    Electronically signed by: GABRIEL Rocha Student  Date: 4/29/2019

## 2019-04-29 NOTE — PROGRESS NOTES
Pt .clarice at 0730 and voices slept fair due to ongoing pain in stump keeping him awake. Encouraged to discuss with Dr. John Ruiz. Stump incision remains approx. And shivam with staples intact.

## 2019-04-30 PROBLEM — L97.529 DIABETIC ULCER OF LEFT FIFTH TOE (HCC): Status: RESOLVED | Noted: 2018-12-06 | Resolved: 2019-04-30

## 2019-04-30 PROBLEM — L03.032 CELLULITIS OF FIFTH TOE, LEFT: Status: RESOLVED | Noted: 2018-12-06 | Resolved: 2019-04-30

## 2019-04-30 PROBLEM — E11.621 DIABETIC ULCER OF LEFT FIFTH TOE (HCC): Status: RESOLVED | Noted: 2018-12-06 | Resolved: 2019-04-30

## 2019-04-30 PROBLEM — L03.115 CELLULITIS OF RIGHT LOWER EXTREMITY: Status: RESOLVED | Noted: 2019-02-15 | Resolved: 2019-04-30

## 2019-04-30 LAB
GLUCOSE BLD-MCNC: 114 MG/DL (ref 70–108)
GLUCOSE BLD-MCNC: 128 MG/DL (ref 70–108)
GLUCOSE BLD-MCNC: 166 MG/DL (ref 70–108)
GLUCOSE BLD-MCNC: 58 MG/DL (ref 70–108)
GLUCOSE BLD-MCNC: 73 MG/DL (ref 70–108)
GLUCOSE BLD-MCNC: 73 MG/DL (ref 70–108)

## 2019-04-30 PROCEDURE — 97116 GAIT TRAINING THERAPY: CPT

## 2019-04-30 PROCEDURE — 97542 WHEELCHAIR MNGMENT TRAINING: CPT

## 2019-04-30 PROCEDURE — 97110 THERAPEUTIC EXERCISES: CPT

## 2019-04-30 PROCEDURE — 1180000000 HC REHAB R&B

## 2019-04-30 PROCEDURE — 82948 REAGENT STRIP/BLOOD GLUCOSE: CPT

## 2019-04-30 PROCEDURE — 2709999900 HC NON-CHARGEABLE SUPPLY

## 2019-04-30 PROCEDURE — 2580000003 HC RX 258: Performed by: PHYSICAL MEDICINE & REHABILITATION

## 2019-04-30 PROCEDURE — 6370000000 HC RX 637 (ALT 250 FOR IP): Performed by: PHYSICAL MEDICINE & REHABILITATION

## 2019-04-30 PROCEDURE — 97535 SELF CARE MNGMENT TRAINING: CPT

## 2019-04-30 PROCEDURE — 97530 THERAPEUTIC ACTIVITIES: CPT

## 2019-04-30 PROCEDURE — 6370000000 HC RX 637 (ALT 250 FOR IP): Performed by: ORTHOPAEDIC SURGERY

## 2019-04-30 RX ORDER — FLASH GLUCOSE SENSOR
1 KIT MISCELLANEOUS CONTINUOUS
Qty: 1 DEVICE | Refills: 0 | Status: SHIPPED | OUTPATIENT
Start: 2019-04-30 | End: 2019-06-04

## 2019-04-30 RX ORDER — INSULIN GLARGINE 100 [IU]/ML
50 INJECTION, SOLUTION SUBCUTANEOUS EVERY MORNING
Status: DISCONTINUED | OUTPATIENT
Start: 2019-05-01 | End: 2019-05-01

## 2019-04-30 RX ORDER — OXYCODONE AND ACETAMINOPHEN 7.5; 325 MG/1; MG/1
1 TABLET ORAL EVERY 4 HOURS PRN
Qty: 42 TABLET | Refills: 0 | Status: SHIPPED | OUTPATIENT
Start: 2019-04-30 | End: 2019-05-07

## 2019-04-30 RX ORDER — LANCETS 30 GAUGE
EACH MISCELLANEOUS
Qty: 200 EACH | Refills: 0 | Status: SHIPPED | OUTPATIENT
Start: 2019-04-30 | End: 2019-10-08 | Stop reason: SDUPTHER

## 2019-04-30 RX ORDER — MIRTAZAPINE 15 MG/1
15 TABLET, FILM COATED ORAL NIGHTLY
Qty: 30 TABLET | Refills: 0 | Status: SHIPPED | OUTPATIENT
Start: 2019-04-30 | End: 2019-06-04

## 2019-04-30 RX ORDER — CYCLOBENZAPRINE HCL 10 MG
10 TABLET ORAL 3 TIMES DAILY PRN
Qty: 20 TABLET | Refills: 0 | Status: SHIPPED | OUTPATIENT
Start: 2019-04-30 | End: 2019-05-10

## 2019-04-30 RX ORDER — SERTRALINE HYDROCHLORIDE 100 MG/1
150 TABLET, FILM COATED ORAL DAILY
Qty: 45 TABLET | Refills: 0 | Status: ON HOLD | OUTPATIENT
Start: 2019-04-30 | End: 2019-10-17 | Stop reason: HOSPADM

## 2019-04-30 RX ADMIN — SERTRALINE 150 MG: 100 TABLET, FILM COATED ORAL at 08:35

## 2019-04-30 RX ADMIN — VITAMIN D, TAB 1000IU (100/BT) 5000 UNITS: 25 TAB at 22:08

## 2019-04-30 RX ADMIN — Medication 10 ML: at 22:10

## 2019-04-30 RX ADMIN — OXYCODONE HYDROCHLORIDE AND ACETAMINOPHEN 2 TABLET: 7.5; 325 TABLET ORAL at 13:32

## 2019-04-30 RX ADMIN — DOCUSATE SODIUM 100 MG: 100 CAPSULE, LIQUID FILLED ORAL at 08:35

## 2019-04-30 RX ADMIN — POTASSIUM CHLORIDE 20 MEQ: 20 TABLET, EXTENDED RELEASE ORAL at 08:35

## 2019-04-30 RX ADMIN — MIRTAZAPINE 15 MG: 15 TABLET, FILM COATED ORAL at 22:08

## 2019-04-30 RX ADMIN — Medication 10 ML: at 22:12

## 2019-04-30 RX ADMIN — VITAMIN D, TAB 1000IU (100/BT) 5000 UNITS: 25 TAB at 08:35

## 2019-04-30 RX ADMIN — FUROSEMIDE 20 MG: 20 TABLET ORAL at 08:35

## 2019-04-30 RX ADMIN — Medication 10 ML: at 08:37

## 2019-04-30 RX ADMIN — OXYCODONE HYDROCHLORIDE AND ACETAMINOPHEN 2 TABLET: 7.5; 325 TABLET ORAL at 22:09

## 2019-04-30 RX ADMIN — OXYCODONE HYDROCHLORIDE AND ACETAMINOPHEN 2 TABLET: 7.5; 325 TABLET ORAL at 08:35

## 2019-04-30 RX ADMIN — OXYCODONE HYDROCHLORIDE AND ACETAMINOPHEN 2 TABLET: 7.5; 325 TABLET ORAL at 17:24

## 2019-04-30 RX ADMIN — INSULIN LISPRO 18 UNITS: 100 INJECTION, SOLUTION INTRAVENOUS; SUBCUTANEOUS at 08:39

## 2019-04-30 RX ADMIN — INSULIN GLARGINE 55 UNITS: 100 INJECTION, SOLUTION SUBCUTANEOUS at 08:41

## 2019-04-30 RX ADMIN — INSULIN LISPRO 18 UNITS: 100 INJECTION, SOLUTION INTRAVENOUS; SUBCUTANEOUS at 17:24

## 2019-04-30 RX ADMIN — FAMOTIDINE 20 MG: 20 TABLET ORAL at 08:35

## 2019-04-30 ASSESSMENT — PAIN SCALES - GENERAL
PAINLEVEL_OUTOF10: 0
PAINLEVEL_OUTOF10: 6
PAINLEVEL_OUTOF10: 6
PAINLEVEL_OUTOF10: 9
PAINLEVEL_OUTOF10: 0
PAINLEVEL_OUTOF10: 6
PAINLEVEL_OUTOF10: 7
PAINLEVEL_OUTOF10: 8
PAINLEVEL_OUTOF10: 6

## 2019-04-30 ASSESSMENT — PAIN DESCRIPTION - FREQUENCY: FREQUENCY: INTERMITTENT

## 2019-04-30 ASSESSMENT — PAIN DESCRIPTION - ONSET: ONSET: ON-GOING

## 2019-04-30 ASSESSMENT — PAIN DESCRIPTION - LOCATION
LOCATION: LEG

## 2019-04-30 ASSESSMENT — PAIN DESCRIPTION - ORIENTATION
ORIENTATION: RIGHT
ORIENTATION: RIGHT;ANTERIOR
ORIENTATION: RIGHT

## 2019-04-30 ASSESSMENT — PAIN DESCRIPTION - DESCRIPTORS: DESCRIPTORS: ACHING

## 2019-04-30 ASSESSMENT — PAIN DESCRIPTION - PAIN TYPE
TYPE: ACUTE PAIN

## 2019-04-30 NOTE — DISCHARGE INSTR - DIET
sugar under control. If you use insulin, counting carbs helps you match the right amount of insulin to the number of grams of carbs in a meal. Then you can change your diet and insulin dose as needed. Testing your blood sugar several times a day can help you learn how carbs affect your blood sugar. A registered dietitian or certified diabetes educator can help you plan meals and snacks. Follow-up care is a key part of your treatment and safety. Be sure to make and go to all appointments, and call your doctor if you are having problems. It's also a good idea to know your test results and keep a list of the medicines you take. How can you care for yourself at home? Know your daily amount of carbohydrates  Your daily amount depends on several things, such as your weight, how active you are, which diabetes medicines you take, and what your goals are for your blood sugar levels. A registered dietitian or certified diabetes educator can help you plan how many carbs to include in each meal and snack. For most adults, a guideline for the daily amount of carbs is:  · 45 to 60 grams at each meal. That's about the same as 3 to 4 carbohydrate servings. · 15 to 20 grams at each snack. That's about the same as 1 carbohydrate serving. Count carbs  Counting carbs lets you know how much rapid-acting insulin to take before you eat. If you use an insulin pump, you get a constant rate of insulin during the day. So the pump must be programmed at meals. This gives you extra insulin to cover the rise in blood sugar after meals. If you take insulin:  · Learn your own insulin-to-carb ratio. You and your diabetes health professional will figure out the ratio. You can do this by testing your blood sugar after meals. For example, you may need a certain amount of insulin for every 15 grams of carbs.   · Add up the carb grams in a meal. Then you can figure out how many units of insulin to take based on your insulin-to-carb ratio.  · Exercise lowers blood sugar. You can use less insulin than you would if you were not doing exercise. Keep in mind that timing matters. If you exercise within 1 hour after a meal, your body may need less insulin for that meal than it would if you exercised 3 hours after the meal. Test your blood sugar to find out how exercise affects your need for insulin. If you do or don't take insulin:  · Look at labels on packaged foods. This can tell you how many carbs are in a serving. You can also use guides from the American Diabetes Association. · Be aware of portions, or serving sizes. If a package has two servings and you eat the whole package, you need to double the number of grams of carbohydrate listed for one serving. · Protein, fat, and fiber do not raise blood sugar as much as carbs do. If you eat a lot of these nutrients in a meal, your blood sugar will rise more slowly than it would otherwise. Eat from all food groups  · Eat at least three meals a day. · Plan meals to include food from all the food groups. The food groups include grains, fruits, dairy, proteins, and vegetables. · Talk to your dietitian or diabetes educator about ways to add limited amounts of sweets into your meal plan. · If you drink alcohol, talk to your doctor. It may not be recommended when you are taking certain diabetes medicines. Learning About the [address] Diet  What is the Mediterranean diet? The Mediterranean diet is a style of eating rather than a diet plan. It features foods eaten in [place], [place], [place] and [place], and other countries along the [place]. It emphasizes eating foods like fish, fruits, vegetables, beans, high-fiber breads and whole grains, nuts, and olive oil. This style of eating includes limited red meat, cheese, and sweets. Why choose the Mediterranean diet? A Mediterranean-style diet may improve heart health. It contains more fat than other heart-healthy diets.  But the fats are mainly from nuts, unsaturated oils (such as fish oils and olive oil), and certain nut or seed oils (such as canola, soybean, or flaxseed oil). These fats may help protect the heart and blood vessels. How can you get started on the Mediterranean diet? Here are some things you can do to switch to a more Mediterranean way of eating. What to eat  · Eat a variety of fruits and vegetables each day, such as grapes, blueberries, tomatoes, broccoli, peppers, figs, olives, spinach, eggplant, beans, lentils, and chickpeas. · Eat a variety of whole-grain foods each day, such as oats, brown rice, and whole wheat bread, pasta, and couscous. · Eat fish at least 2 times a week. Try tuna, salmon, mackerel, lake trout, herring, or sardines. · Eat moderate amounts of low-fat dairy products, such as milk, cheese, or yogurt. · Eat moderate amounts of poultry and eggs. · Choose healthy (unsaturated) fats, such as nuts, olive oil, and certain nut or seed oils like canola, soybean, and flaxseed. · Limit unhealthy (saturated) fats, such as butter, palm oil, and coconut oil. And limit fats found in animal products, such as meat and dairy products made with whole milk. Try to eat red meat only a few times a month in very small amounts. · Limit sweets and desserts to only a few times a week. This includes sugar-sweetened drinks like soda. The Mediterranean diet may also include red wine with your meal--1 glass each day for women and up to 2 glasses a day for men. Tips for eating at home  · Use herbs, spices, garlic, lemon zest, and citrus juice instead of salt to add flavor to foods. · Add avocado slices to your sandwich instead of ohara. · Have fish for lunch or dinner instead of red meat. Brush the fish with olive oil, and broil or grill it. · Sprinkle your salad with seeds or nuts instead of cheese. · Cook with olive or canola oil instead of butter or oils that are high in saturated fat.   · Switch from 2% milk or whole milk to 1% or fat-free milk. · Dip raw vegetables in a vinaigrette dressing or hummus instead of dips made from mayonnaise or sour cream.  · Have a piece of fruit for dessert instead of a piece of cake. Try baked apples, or have some dried fruit. Tips for eating out  · Try broiled, grilled, baked, or poached fish instead of having it fried or breaded. · Ask your  to have your meals prepared with olive oil instead of butter. · Order dishes made with marinara sauce or sauces made from olive oil. Avoid sauces made from cream or mayonnaise. · Choose whole-grain breads, whole wheat pasta and pizza crust, brown rice, beans, and lentils. · Cut back on butter or margarine on bread. Instead, you can dip your bread in a small amount of olive oil. · Ask for a side salad or grilled vegetables instead of french fries or chips.

## 2019-04-30 NOTE — CONSULTS
Inpatient consult to Endocrinology  Consult performed by: Greg Liang MD  Consult ordered by: Hui De La Rosa MD        No chief complaint on file. Above knee amputation of right lower extremity ([facility] [Z89.611]   Patient Active Problem List   Diagnosis Code    Status post below knee amputation of right lower extremity ([facility] Z89.511    Gait disturbance R26.9    Sebaceous cyst L72.3    Uncontrolled type 2 diabetes mellitus with hyperglycemia, with long-term current use of insulin ([facility]) E11.65, Z79.4    Severe bipolar II disorder, recent episode major depressive, remission ([facility]) F31.81    Bipolar 1 disorder ([facility]) F31.9    Leukocytosis D72.829    Hyponatremia E87.1    Normocytic anemia D64.9    Obesity (BMI 30-39. 9) E66.9    History of noncompliance with medical treatment Z91.19    Essential hypertension I10    Tobacco dependence F17.200    History of osteomyelitis Z87.39    Gastroesophageal reflux disease without esophagitis K21.9    History of marijuana use Z87.898    Physical debility R53.81    Diabetic ulcer of left fifth toe ([facility]) E11.621, L97.529    Cellulitis of fifth toe, left L03.032    Cellulitis of right lower extremity L03.115    Anemia D64.9    DM type 2, uncontrolled, with lower extremity ulcer ([facility]) E11.622, E11.65, L97.909    Weakness R53.1    Infective myositis of right lower leg M60.061    Pyogenic inflammation of bone ([facility]) M86.9    Cellulitis and abscess of leg L03.119, L02.419    Infection of above knee amputation stump of right leg ([facility]) T87.43    Above knee amputation of right lower extremity ([facility] Z89.611     <principal problem not specified>  4/22/2019  5:30 PM  Admission weight: 211 lb 2 oz (95.8 kg)  [id number]  [id number]  [facility]68/068-A  He has been referred by Dr Akosua Benitez for evaluation of Diabetes Mellitus type 2  Subjective:   Ania Jamison  is a 44 y.o.  y.o. male  who has been referred for evaluation of Type 2 diabetes mellitus.   He is on 55 U Lantus BID and Humalog 18 U TID AC . He gets a lot of hypoglycemia. Review of Systems  Review of Systems - General ROS: negative   Psychological ROS: negative   Hematological and Lymphatic ROS: No history of blood clots or bleeding disorder. Respiratory ROS: no cough, shortness of breath, or wheezing   Cardiovascular ROS: no chest pain or dyspnea on exertion   Gastrointestinal ROS: no abdominal pain, change in bowel habits, or black or bloody stools   Genito-Urinary ROS: no dysuria, trouble voiding, or hematuria   Musculoskeletal ROS: negative   Neurological ROS: no TIA or stroke symptoms   Dermatological ROS: negative    Past Medical, Surgical, Family, Social and Allergy Histories:  I have reviewed the patient's medical history in detail and updated the computerized patient record. has a past medical history of Bipolar 2 disorder ([address] Diabetes mellitus type 2, uncontrolled ([address] Diabetic polyneuropathy ([address] Diabetic ulcer of right foot associated with type 2 diabetes mellitus ([address] Essential hypertension, GERD (gastroesophageal reflux disease), Hammer toe of left foot, Heart murmur, History of tobacco abuse, Hx of AKA (above knee amputation), right ([address] Macular edema, diabetic, bilateral ([address] Marijuana abuse in remission, Onychomycosis, and 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 has a past surgical history that includes other surgical history (Right, 1/14/15); Abscess Drainage (Right); Toe amputation (Right, 1/16/15); Foot Debridement (Right, 07/01/2016); Leg amputation below knee (Right, 07/20/2016); [place] tooth extraction (?when); pr drain infect shoulder bursa (Left, 8/18/2017); pr office/outpt visit,procedure only (Right, 9/20/2018); incision and drainage (Right, 2/18/2019); Leg amputation below knee (Right, 4/4/2019); and AMPUTATION ABOVE KNEE (Right, 4/18/2019). reports that he quit smoking about 34 years ago. His smoking use included cigars.  He has a 65.00 pack-year smoking history. He has never used smokeless tobacco. He reports that he drank alcohol. He reports that he does not use drugs. family history includes Arthritis in his maternal grandfather; Depression in his mother; Diabetes in his mother; Early Death in his mother; Heart Disease in his maternal grandfather; High Blood Pressure in his mother; High Cholesterol in his mother; Other in his mother; Vision Loss in his maternal grandmother. Allergies   Allergen Reactions    Pcn [Penicillins] Shortness Of Breath, Nausea And Vomiting and Other (See Comments)     Does not remember reactions. Has TOLERATED amoxicillin and several different cephalosporins.      Clindamycin/Lincomycin Itching     Current Facility-Administered Medications   Medication Dose Route Frequency Provider Last Rate Last Dose    sodium chloride flush 0.9 % injection 10 mL  10 mL Intravenous BID Myles Hilliard MD   10 mL at 04/30/19 0837    oxyCODONE-acetaminophen (PERCOCET) 7.5-325 MG per tablet 1 tablet  1 tablet Oral Q4H PRN Myles Hilliard MD        Or    oxyCODONE-acetaminophen (PERCOCET) 7.5-325 MG per tablet 2 tablet  2 tablet Oral Q4H PRN Myles Hilliard MD   2 tablet at 04/30/19 1724    furosemide (LASIX) tablet 20 mg  20 mg Oral Daily Myles Hilliard MD   20 mg at 04/30/19 9895    potassium chloride (KLOR-CON M) extended release tablet 20 mEq  20 mEq Oral Daily with breakfast Myles Hilliard MD   20 mEq at 04/30/19 6194    vitamin D (CHOLECALCIFEROL) tablet 5,000 Units  5,000 Units Oral BID Myles Hilliard MD   5,000 Units at 04/30/19 5313    famotidine (PEPCID) tablet 20 mg  20 mg Oral Daily Myles Hilliard MD   20 mg at 04/30/19 0835    dextrose 5 % solution  100 mL/hr Intravenous PRN Irina Powell MD        dextrose 50 % solution 12.5 g  12.5 g Intravenous PRN Irina Powell MD        glucagon (rDNA) injection 1 mg  1 mg Intramuscular PRN Irina Powell MD        glucose (GLUTOSE) 40 % oral gel 15 g 15 g Oral PRN Junaid Dillard MD        acetaminophen (TYLENOL) tablet 650 mg  650 mg Oral Q4H PRN Junaid Dillard MD        cyclobenzaprine (FLEXERIL) tablet 10 mg  10 mg Oral TID PRN Junaid Dillard MD   10 mg at 04/29/19 0714    insulin glargine (LANTUS) injection vial 55 Units  55 Units Subcutaneous BID Junaid Dillard MD   55 Units at 04/30/19 0841    insulin lispro (HUMALOG) injection vial 18 Units  18 Units Subcutaneous TID AC Junaid Dillard MD   18 Units at 04/30/19 1724    magnesium hydroxide (MILK OF MAGNESIA) 400 MG/5ML suspension 30 mL  30 mL Oral Daily PRN Junaid Dillard MD        mirtazapine (REMERON) tablet 15 mg  15 mg Oral Nightly Junaid Dillard MD   15 mg at 04/29/19 2250    ondansetron (ZOFRAN) injection 4 mg  4 mg Intravenous Q6H PRN Junaid Dillard MD        sertraline (ZOLOFT) tablet 150 mg  150 mg Oral Daily Junaid Dillard MD   150 mg at 04/30/19 2922    docusate sodium (COLACE) capsule 100 mg  100 mg Oral BID Belle Shaw MD   100 mg at 04/30/19 7919    senna (SENOKOT) tablet 17.2 mg  2 tablet Oral Nightly Belle Shaw MD   17.2 mg at 04/29/19 2250    ondansetron (ZOFRAN) tablet 4 mg  4 mg Oral Q8H PRN Belle Shaw MD           Objective:     Vitals:    04/30/19 0830   BP: 125/66   Pulse: 104   Resp: 20   Temp: 95.4 °F (35.2 °C)   SpO2: 98%    [unfilled] [unfilled] Body mass index is 34.45 kg/m². Wt Readings from Last 3 Encounters:   04/29/19 213 lb 7 oz (96.8 kg)   04/18/19 222 lb (100.7 kg)   04/16/19 222 lb (100.7 kg)    Admission weight: 211 lb 2 oz (95.8 kg)    General:  alert, appears stated age and cooperative   Oropharynx: lips, mucosa, and tongue normal; teeth and gums normal    Eyes:  conjunctivae/corneas clear. PERRL, EOM's intact. Fundi benign.     Ears:  normal TM's and external ear canals both ears   Neck: no adenopathy, no carotid bruit, no JVD, supple, symmetrical, trachea midline and thyroid not enlarged, symmetric, no tenderness/mass/nodules Thyroid:  no palpable nodule   Lung: clear to auscultation bilaterallyPercussion: Normal   Heart:  regular rate and rhythm, S1, S2 normal, no murmur, click, rub or gallopApical impulse normal   Abdomen: soft, non-tender; bowel sounds normal; no masses,  no organomegaly   Extremities: Right AK amputation   Skin: warm and dry, no hyperpigmentation, vitiligo, or suspicious lesions   Pulses: 2+ and symmetric   Neuro: normal without focal findings, mental status, speech normal, alert and oriented x3, HUMBERTO and reflexes normal and symmetric     Lab Review  Results for Alverna Hammans (MRN [id number]) as of 4/30/2019 17:48   Ref. Range 4/30/2019 08:29 4/30/2019 11:21 4/30/2019 11:39 4/30/2019 12:27 4/30/2019 17:15   POC Glucose Latest Ref Range: 70 - 108 mg/dl 114 (H) 58 (L) 73 128 (H) 166 (H)     Lab Results   Component Value Date    TSH 5.540 (H) 10/17/2018    T4FREE 1.20 10/17/2018   Results for Thomas PENA (MRN [id number]) as of 4/30/2019 17:48   Ref. Range 10/10/2018 09:40   C-Peptide Latest Ref Range: 1.1 - 4.4 ng/mL 5.5 (H)   Results for Alverna Hammans (MRN [id number]) as of 4/30/2019 17:48   Ref.  Range 10/10/2018 09:40   C-Peptide Latest Ref Range: 1.1 - 4.4 ng/mL 5.5 (H)     No results found for: TESTOSTERONE  CBC:  Lab Results   Component Value Date    WBC 11.0 04/29/2019    RBC 3.99 04/29/2019    HGB 10.9 04/29/2019    HCT 33.8 04/29/2019    MCV 84.7 04/29/2019    MCH 27.3 04/29/2019    MCHC 32.2 04/29/2019    RDW 14.4 04/05/2018     04/29/2019    MPV 8.7 04/29/2019     CMP:    Lab Results   Component Value Date     04/29/2019    K 4.6 04/29/2019    K 4.2 04/03/2019    CL 99 04/29/2019    CO2 26 04/29/2019    BUN 20 04/29/2019    CREATININE 0.7 04/29/2019    GFRAA >60 08/24/2016    LABGLOM >90 04/29/2019    GLUCOSE 209 04/29/2019    PROT 7.7 04/29/2019    LABALBU 3.8 04/29/2019    CALCIUM 9.5 04/29/2019    BILITOT 0.2 04/29/2019    ALKPHOS 72 04/29/2019    AST 9 04/29/2019    ALT <5 04/29/2019 Hepatic Function Panel:    Lab Results   Component Value Date    ALKPHOS 72 04/29/2019    ALT <5 04/29/2019    AST 9 04/29/2019    PROT 7.7 04/29/2019    BILITOT 0.2 04/29/2019    BILIDIR <0.2 04/15/2019    IBILI 0.1 03/02/2015    LABALBU 3.8 04/29/2019     Magnesium:    Lab Results   Component Value Date    MG 1.8 04/16/2019     PT/INR:    Lab Results   Component Value Date    PROTIME 12.2 01/03/2019    INR 1.06 01/03/2019     HgBA1c:    Lab Results   Component Value Date    LABA1C 9.1 04/02/2019     TSH:    Lab Results   Component Value Date    TSH 5.540 10/17/2018   ,   No results for input(s): CKTOTAL, CKMB, CKMBINDEX, TROPONINI in the last 72 hours. FLP:    Lab Results   Component Value Date    TRIG 328 10/10/2018    HDL 30 10/10/2018    LDLCALC 65 10/10/2018     DIET: DIET CARB CONTROL; Dietary Nutrition Supplements: Wound Healing Oral Supplement  Dietary Nutrition Supplements: Low Calorie High Protein Supplement  Assessment:   Active Problems:    Above knee amputation of right lower extremity (HCC)  Resolved Problems:    * No resolved hospital problems. *       Diabetes Mellitus type 2, under poor control. Hypothyroid  Plan:      Goals        Blood Pressure     Blood Pressure < 140/90        Diet     Reduce carbohydrate intake to 180-200gms per day        Lifestyle     Check the blood sguar 4x per day        Result Component     HEMOGLOBIN A1C < 7.6         \"Thank you for asking us to see this complex patient. \"    Haroldine Beets 50 U in AM; THis has to be regulated to control fasting blood sugar.  Keep it between 100 to 130 mg  Take 60 grams of carbs with each meal. Take plate method for portion control  One unit fo Humalog to 7 grams of carbs plus low dose sliding scale Aide

## 2019-04-30 NOTE — PROGRESS NOTES
PORTABLE PATIENT PROFILE  Ray Yepez  [id number]    MEDICAL DIAGNOSIS/CONDITION:   Patient Active Problem List   Diagnosis    Status post below knee amputation of right lower extremity (HCC)    Gait disturbance    Sebaceous cyst    Uncontrolled type 2 diabetes mellitus with hyperglycemia, with long-term current use of insulin (HCC)    Severe bipolar II disorder, recent episode major depressive, remission (HCC)    Bipolar 1 disorder (HCC)    Leukocytosis    Hyponatremia    Normocytic anemia    Obesity (BMI 30-39. 9)    History of noncompliance with medical treatment    Essential hypertension    Tobacco dependence    History of osteomyelitis    Gastroesophageal reflux disease without esophagitis    History of marijuana use    Physical debility    Diabetic ulcer of left fifth toe (HCC)    Cellulitis of fifth toe, left    Cellulitis of right lower extremity    Anemia    DM type 2, uncontrolled, with lower extremity ulcer (HCC)    Weakness    Infective myositis of right lower leg    Pyogenic inflammation of bone (HCC)    Cellulitis and abscess of leg    Infection of above knee amputation stump of right leg ([address]    Above knee amputation of right lower extremity ([facility]       INSURANCE INFORMATION:  Payor: MEDICARE /  /  /     ADVANCED DIRECTIVES:   Advance Directives (For Healthcare)  Pre-existing DNR Comfort Care/DNR Arrest/DNI Order: No  Healthcare Directive: Yes, patient has an advance directive for healthcare treatment  Type of Healthcare Directive: Living will, Durable power of  for health care  Copy in Chart: Yes, copy in chart  Chart Copy Status [de-identified] Active  Date Reviewed and Current[de-identified] 04/02/19  Information on Healthcare Directives Requested: No  Patient Requests Assistance: No  Advance Directives: Copy in paper lite chart  Healthcare Agent Appointed: Luci Castro Agent's Name: [address] [place] [address] Agent's Phone Number: doesn't have their phone numbers  If you are unable to speak for yourself, does your Healthcare Agent or Legal Spokesperson know your healthcare wishes?: Yes  Full Code     EMERGENCY CONTACT:       RISK FACTORS:   Social History     Tobacco Use    Smoking status: Former Smoker     Packs/day: 5.00     Years: 13.00     Pack years: 65.00     Types: Cigars     Last attempt to quit:      Years since quittin.3    Smokeless tobacco: Never Used   Substance Use Topics    Alcohol use: Not Currently     Alcohol/week: 0.0 oz       ALLERGIES:  Allergies   Allergen Reactions    Pcn [Penicillins] Shortness Of Breath, Nausea And Vomiting and Other (See Comments)     Does not remember reactions. Has TOLERATED amoxicillin and several different cephalosporins.  Clindamycin/Lincomycin Itching       FUNCTIONAL STATUS:  Eatin - Patient feeds self  Groomin - Able to perform 3-4 tasks with touching help(seated with electric shaver for head with assistance for back of head)  Bathin - Able to bathe all 10 areas with setup/sup/cues  Dressing-Upper: 5 - Requires setup/supervision/cues and/or requires assist with presthesis/brace only  Dressing-Lower: 5 - Requires setup/supervision/cues and/or staff applies TEDS/prosthesis/brace only(supinein bed with doffing and donning shorts.  Additional time required to thread LLE through shorts)  Toiletin - Requires setup/supervision/cues(seated in wheel chair with use of urinal. )    SPHINCTER CONTROL  Bladder: 5 - Voids, but staff empties urinal/BSC and/or requires setup/sup/cues to urinate (e.g. timed voids/self cath) (no accidents, no touching) (help w/I&O cath)  Bowel: 6 - Uses toilet independently with device or oral medication(s)    Bed, Chair, Wheel Chair: 4 - Requires steadying assistance only <25% assist  and/or requires assist with one leg only  Toilet Transfer: 5 - Requires setup/supervision/cues  Tub Transfer: 4 - Minimal contact assistance, pt. expends 75% or more effort(CGA squat pivot to and from shower bench )    LOCOMOTION  Primary Mode: Wheel Chair  Distance Walked: 77'  Distance Traveled in Wheel Chair: 150'  Walk: 2 - Maximal Assistance Requires up to [address] requires assistance of one person to walk between  feet (Patient performs 25-49% of locomotion effort or goes between  feet)  Wheel Chair: 6 - Modified [place] Operates wheelchair at least 150 feet with an ambulatory device, orthosis or prosthesis OR requires extra amount of time OR there is concern for safety  Stairs: 0 - Activity Does not Occur ( 0 only for the admission assessment)    COMMUNICATION  Comprehension: 5 - Patient understands basic needs (hungry/hot/pain)  Expression: 6 - Device used to express complex ideas/needs    SOCIAL COGNITION  Social Interaction: 6 - Patient requires medication for mood and/or effect  Problem Solvin - Patient able to solve simple/routine tasks  Memory: 6 - Patient requires device to recall (e.g. memory book)    EQUIPMENT AND DEVICES:  Assistive Devices  Assistive Devices: Wheelchair, Walker    SWALLOWING:   Difficulty Chewing or Swallowing Food: No    VISION AND HEARING:   Sensory Problems  Visual impairment: None    PHYSICIANS INVOLVED WITH CARE:    Justine Hennessy MD  No ref. provider found  No primary care provider on file.   Justine Hennessy MD      Immunization History   Administered Date(s) Administered    Influenza Virus Vaccine 10/06/2014, 10/01/2017    PPD Test 2018, 2018    Pneumococcal Conjugate 7-valent 10/15/2014    Pneumococcal Polysaccharide ([id number]) 10/06/2014    Rabies 2015, 2015, 2015, 2015    Rabies Immune Globulin 2015         Portable Patient Profile was provided to patient and/or family on 2019

## 2019-04-30 NOTE — PROGRESS NOTES
[address] [address]  Recreational Therapy  Daily Note  [address]    Time Spent with Patient: 90 minutes    Date:  4/30/2019       Patient Name: Patti Phillips      MRN: [id number]      YOB: 1970 (44 y.o.)       Gender: male  Diagnosis: Above knee amputation of right lower extremity   Referring Practitioner: Dr. Omkar Cabalelro     RESTRICTIONS/PRECAUTIONS:  Restrictions/Precautions: Fall Risk, Weight Bearing, General Precautions  Vision: Within Functional Limits  Hearing: Within functional limits    PAIN: 0    SUBJECTIVE:  I like rummy     OBJECTIVE:  Could not find an extra player to play [employer] so pt and peer and RT played rummy this afternoon-pt enjoyed game and getting out of his room-MI with w/c propulsion and was going to get his laundry out of dryer after activity-pleasant and ready to go home tomorrow-    Social Interaction: 6 - Patient requires medication for mood and/or effect    Patient Education  New Education Provided: Importance of Leisure,     Electronically signed by: GERMANIA Sorensen  Date: 4/30/2019

## 2019-04-30 NOTE — PROGRESS NOTES
slow down for safety. Pt. slightly unsteady at times but able to correct all LOBs. Pt. limited by fatigue and pain. Distance: 50' x 1    Stairs  # Steps : 1  Rails: Bilateral(// bars)  Curbs: 6\"  Assistance: Contact guard assistance  Comment: Pt. ascended/descended 6\" platform step in // bars. Pt. hesitant to complete. Slightly unsteady but no LOB. Balance  Comments: Pt. completed static stand at walker with attempts to decrease UE support. Pt. able to tolerate standing with single UE support but unable to achieve sufficient stand with no UE support. Wheelchair Activities  Propulsion: Yes  Propulsion 1  Propulsion: Manual  Level: Level Tile(; Ramp)  Method: RUE;LUE  Level of Assistance: Modified independent;Stand by assistance  Description/ Details: Good propulsion speed and manueverability. SBA for safety when completing ramp  Distance: 150' x 1, 500' x 1 including 25' ramp          Exercises:  Exercises  Comments: Seated B LE ther ex 15x each consisting of marches, hip abd/add AROM, glute sets, L LE long arc quads, and ankle pumps all to increase strength and improve functional mobility. Activity Tolerance:  Activity Tolerance: Patient Tolerated treatment well;Patient limited by fatigue;Patient limited by endurance; Patient limited by pain    Assessment: Body structures, Functions, Activity limitations: Decreased functional mobility , Decreased ADL status, Decreased strength, Decreased endurance, Decreased balance  Assessment: Pt. tolerated session fairly well. Pt. pleasant and cooperative throughout session. Pt. slightly unsteady on feet at times requiring SBA for safety during gait. Pt. demos good WC mobility throughout session. Pt. would benefit from continued skilled PT to enhance functional mobility.   Prognosis: Good          Discharge Recommendations:  Discharge Recommendations: Continue to assess pending progress    Patient Education:  Patient Education: gait, ther ex, POC,

## 2019-04-30 NOTE — PLAN OF CARE
Problem: Pain:  Goal: Pain level will decrease  Description  Pain level will decrease  Outcome: Ongoing   C/o pain to Right stump. Wrapped with Ace wrap today to assist with edema. PRN percocet available. Problem: Falls - Risk of:  Goal: Will remain free from falls  Description  Will remain free from falls  Outcome: Ongoing   No falls sustained at this time. Alert to call light. Problem: Risk for Impaired Skin Integrity  Goal: Tissue integrity - skin and mucous membranes  Description  Structural intactness and normal physiological function of skin and  mucous membranes. Outcome: Ongoing   No new skin issues noted  Problem: Infection - Surgical Site:  Goal: Will show no infection signs and symptoms  Description  Will show no infection signs and symptoms  Outcome: Ongoing   Site free from s/s infections. Scant drainage noted. Ace wrap in place. Problem: Serum Glucose Level - Abnormal:  Goal: Ability to maintain appropriate glucose levels will improve  Description  Ability to maintain appropriate glucose levels will improve  Outcome: Ongoing   AC/HS chems Administered scheduled covereage with breakfast, before lunch patient expressed that he knew his sugar was low. Chem checked and patient given a snack. Chem rechecked WNL before lunch served. Patient declined scheduled insulin for lunch. Appointment made with endocrinologist for after discharge for diabetes management. Problem: Mood - Altered:  Goal: Mood stable  Description  Mood stable  Outcome: Ongoing   No adverse behaviors noted. Pleasant and cooperative with staff. Problem: DISCHARGE BARRIERS  Goal: Patient's continuum of care needs are met  Outcome: Ongoing   Discharge planned for tomorrow, 5/1 with HEP.

## 2019-04-30 NOTE — PROGRESS NOTES
[place] [address]  INPATIENT OCCUPATIONAL THERAPY  - [address] [facility],4Th Floor  DAILY NOTE    Time:  Time In: 1400  Time Out: 1430  Timed Code Treatment Minutes: 30 Minutes  Minutes: 30          Date: 2019  Patient Name: Martín Singh,   Gender: male      Room: [facility]68/068-A  MRN: [id number]  : 1970  (44 y.o.)  Referring Practitioner: Dr. Warner Cali   Diagnosis: Above knee amputation of right lower extremity   Additional Pertinent Hx: 44 y.o. 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 He was admitted 2019 for for complaint of pain secondary to a wound in his prior below knee amputation. He originally had a RIGHT below-knee amputation for persistent osteomyelitis in his RIGHT foot on 2016 and subsequently developed a poorly healing wound and osteomyelitis with Streptococcus agalactiae. On 2019 he underwent incision and drainage with saucerization of the RIGHT tibia and fibula by Dr. Elvira Lazar and a wound VAC was placed and he was discharged to home with home health with IV Rocephin. He had low-grade temperatures and leukocytosis noted at home by the home health nurse and he was referred to come back into the emergency department for evaluation. On 2019 he was agreeable to having a RIGHT above-knee amputation by Dr. Elvira Lazar. The patient is a poorly controlled type II diabetic and has not had controlled hemoglobin A1c values at any time from  2 .     Past Weight Bearing, General Precautions     Right Lower Extremity Weight Bearing: Non Weight Bearing      Other position/activity restrictions: right AKA      Prior Level of Function:  ADL Assistance: Independent  Homemaking Assistance: Independent  Ambulation Assistance: Independent(wheelchair)  Transfer Assistance: Independent  Additional Comments: Pt reporting he was on TCU back in Feb 2019. Pt stating he was indep with ADL tasks at home. His wheelchair will fit through all his doorways at home. Subjective   Subjective: Patient seated in wheel chair     Pain:    No complaints  Objective    Transfers  Stand Pivot Transfers: Stand by assistance  Sit to stand: Supervision  Stand to sit: Supervision  Transfer Comments: to/from w/c       Balance  Sitting Balance: Independent(In wheel chair)    Standing Balance: Modified independent    Time: standing at table with R UE supported on table with reaching activity needed for improved ADLs   Patient with CGA attempted to release RUE off table with reaching activity and stated \"no I cant do that\". Reaching activity needed for improved standing balance task     Functional Mobility  Functional - Mobility Device: Wheelchair  Assist Level: Modified independent   Functional Mobility Comments: Patient completed functional mobility from therapy gym to room            Comment: Patient completed B UE AROM exercises with 3# dowel naty and # 3 weight and #2 pound medicine ball in all joints and planes needed for improved strengthening and endurance. Education on form and technique      Additional activity: Patient Mod I standing at washing machine with unloading clothes to top of dry then seated in wheel chair loading clothes into dry for IADls. Activity Tolerance:  Activity Tolerance: Patient Tolerated treatment well    Assessment:  Assessment: Patient is able to complete shower transfers to/from extended bench with Mod Independent using grab bars.  The patient is Mod I and able to independence. Long term goal 2: Pt will complete BUE HEP using handout with Mod I to improve UB strength to 5/5. If patient is discharged prior to progress note completion, this note is to serve as the discharge note with all goals being unmet unless indicated otherwise.

## 2019-04-30 NOTE — PROGRESS NOTES
Right 07/20/2016    LEG AMPUTATION BELOW KNEE Right 4/4/2019    I&D AND REVISION OF AMPUTATION RIGHT LEG performed by Tuan Morejon MD at [facility] Right 1/14/15    sole of foot I&D    MA DRAIN INFECT SHOULDER BURSA Left 8/18/2017    LEFT SHOULDER INCISION AND DRAINAGE performed by Tuan Morejon MD at [address] [place] OFFICE/OUTPT [address] Right 9/20/2018    EXCISIONAL DEBRIDEMENT RIGHT BKA STUMP performed by Pily Rincon MD at [place] [address] Right 1/16/15    2nd toe with wound vac applied    WISDOM TOOTH EXTRACTION  ? when       Restrictions/Precautions:  Fall Risk, Weight Bearing, General Precautions     Right Lower Extremity Weight Bearing: Non Weight Bearing              Other position/activity restrictions: right AKA        Prior Level of Function:  ADL Assistance: Independent  Homemaking Assistance: Independent  Ambulation Assistance: Independent(wheelchair)  Transfer Assistance: Independent  Additional Comments: Pt reporting he was on TCU back in Feb 2019. Pt stating he was indep with ADL tasks at home. His wheelchair will fit through all his doorways at home. Subjective:     Subjective: Pt. seated in WC upon arrival and pleasantly agrees to therapy session. Pt. reports increased pain this date. Pt. not current on pain meds. Pain:  Yes. Pain Assessment  Pain Assessment: 0-10  Pain Level:  6(Reports 6/10 at beginning of session, worsens as session progresses)  Pain Type: Acute pain  Pain Location: Leg  Pain Orientation: Right       Social/Functional:  Lives With: Other (comment)(Roommate)  Type of Home: House  Home Layout: Two level, Able to Live on Main level with bedroom/bathroom  Home Access: Ramped entrance  Home Equipment: Wheelchair-manual, Rolling walker(wheelchair not in good shape)     Objective:  Rolling to Left: Modified independent  Rolling to Right: Modified independent  Supine to Sit: Modified independent  Sit to Supine: Modified independent  Scooting: Modified independent    Transfers  Sit to Stand: Modified independent  Stand to sit: Modified independent  Bed to Chair: Modified independent  Lateral Transfers: Modified independent       Ambulation 1  Surface: level tile  Device: Rolling Walker  Assistance: Modified Independent;Stand by assistance  Distance: 10' x 2 at mod I; 25', 40', [facility]  Comments: Pt. limited by pain this session, unable to complete longer distance  Ambulation 2  Surface - 2: uneven  Device 2: [address] 2: Contact guard assistance  Quality of Gait 2: Pt. ambulating on gravel path. Cues for correct technique and safety. Pt. slightly unsteady but with no LOB. Cues to slow down for safety. Distance: 12' x 1       Balance  Comments: Pt. completed multiple standing trials at walker with varrying amounts of UE support. Pt. with difficulty maintaining balance without R UE support secondary to L sided lean. Wheelchair Activities  Propulsion: Yes  Propulsion 1  Propulsion: Manual  Level: Level Tile  Method: RUE;LUE  Level of Assistance: Modified independent  Description/ Details: Good propulsion speed and manueverability. Distance: 150' x 2, 75' x 1       Other Activities  Comment: Pt. able to pick object up from floor using a long handled reacher with SBA. Exercises:  Exercises  Comments: Seated B LE ther ex 15x each consisting of marches, hip abd/add AROM, glute sets, L LE long arc quads, ankle pumps, resisted hip abd/add, and resisted HS curls. Pt. also completed standing R LE 3 way hip exercises 10x each all to increase strength and improve functional mobility. Activity Tolerance:  Activity Tolerance: Patient Tolerated treatment well;Patient limited by fatigue;Patient limited by endurance    Assessment:   Body structures, Functions, Activity limitations: Decreased functional mobility , Decreased ADL status, Decreased strength, Decreased endurance, Decreased balance  Assessment: Pt. tolerated session fairly well. Pt. limited by increased pain this session and unable to tolerate further gait. Pt. motivated. Pt. would benefit from continued skilled PT to increase strength and endurance to enhance functional mobility. Prognosis: Good          Discharge Recommendations:  Discharge Recommendations: Continue to assess pending progress    Patient Education:  Patient Education: gait, ther ex, POC    Equipment Recommendations:  Equipment Needed: Yes(w/c, has RW)    Safety:  Type of devices:  All fall risk precautions in place, Left in chair, Call light within reach, Nurse notified, Gait belt    Plan:  Times per week: 5x/wk 90 min, 1x/wk 30 min  Current Treatment Recommendations: Strengthening, Transfer Training, Wheelchair Mobility Training, Patient/Caregiver Education & Training, Functional Mobility Training, Safety Education & Training, Positioning, Gait Training, Balance Training, Endurance Training, ADL/Self-care Training, Stair training, Home Exercise Program, Equipment Evaluation, Education, & procurement    Goals:  Patient goals : go home    Short term goals  Time Frame for Short term goals: 1 week  Short term goal 1: Pt to perform supine to/from sit at mod I to get in and out of bed  Short term goal 2: Pt to perform sit to/from stand at S with proper hand placement to rise from bed or chair  Short term goal 3: Pt to perform stand pivot with RW at SBA to get to and from bed and wheelchair  Short term goal 4: Pt to ambulate 30ft with RW at SBA to walk to and from restroom  Short term goal 5: Pt to propel wheelchair community distances and up and down 25 ft ramp at mod I for functional mobility    Long term goals  Time Frame for Long term goals : 2 weeks  Long term goal 1: Pt to perform sit to/from stand at mod I with proper hand placement to rise from bed or chair  Long term goal 2: Pt to perform stand pivot with RW at mod I to get to and from bed and wheelchair  Long term goal 3: Pt to ambulate 50ft with RW at mod I to walk to and from restroom  Long term goal 4: Pt to negotiate car transfer at S for transport to and from medical appts  If patient is discharged prior to progress note completion, this note is to serve as the discharge note with all goals being unmet unless indicated otherwise.

## 2019-05-01 VITALS
HEART RATE: 104 BPM | WEIGHT: 217.7 LBS | TEMPERATURE: 98 F | BODY MASS INDEX: 34.99 KG/M2 | RESPIRATION RATE: 16 BRPM | DIASTOLIC BLOOD PRESSURE: 81 MMHG | HEIGHT: 66 IN | OXYGEN SATURATION: 100 % | SYSTOLIC BLOOD PRESSURE: 155 MMHG

## 2019-05-01 LAB
GLUCOSE BLD-MCNC: 125 MG/DL (ref 70–108)
GLUCOSE BLD-MCNC: 179 MG/DL (ref 70–108)

## 2019-05-01 PROCEDURE — 97542 WHEELCHAIR MNGMENT TRAINING: CPT

## 2019-05-01 PROCEDURE — 6370000000 HC RX 637 (ALT 250 FOR IP): Performed by: INTERNAL MEDICINE

## 2019-05-01 PROCEDURE — 6370000000 HC RX 637 (ALT 250 FOR IP): Performed by: ORTHOPAEDIC SURGERY

## 2019-05-01 PROCEDURE — 97110 THERAPEUTIC EXERCISES: CPT

## 2019-05-01 PROCEDURE — 6370000000 HC RX 637 (ALT 250 FOR IP): Performed by: PHYSICAL MEDICINE & REHABILITATION

## 2019-05-01 PROCEDURE — 2709999900 HC NON-CHARGEABLE SUPPLY

## 2019-05-01 PROCEDURE — 82948 REAGENT STRIP/BLOOD GLUCOSE: CPT

## 2019-05-01 RX ORDER — INSULIN GLARGINE 100 [IU]/ML
48 INJECTION, SOLUTION SUBCUTANEOUS EVERY MORNING
Status: DISCONTINUED | OUTPATIENT
Start: 2019-05-01 | End: 2019-05-01 | Stop reason: HOSPADM

## 2019-05-01 RX ADMIN — SERTRALINE 150 MG: 100 TABLET, FILM COATED ORAL at 08:38

## 2019-05-01 RX ADMIN — VITAMIN D, TAB 1000IU (100/BT) 5000 UNITS: 25 TAB at 08:38

## 2019-05-01 RX ADMIN — DOCUSATE SODIUM 100 MG: 100 CAPSULE, LIQUID FILLED ORAL at 08:38

## 2019-05-01 RX ADMIN — POTASSIUM CHLORIDE 20 MEQ: 20 TABLET, EXTENDED RELEASE ORAL at 08:38

## 2019-05-01 RX ADMIN — INSULIN GLARGINE 48 UNITS: 100 INJECTION, SOLUTION SUBCUTANEOUS at 08:41

## 2019-05-01 RX ADMIN — INSULIN LISPRO 1 UNITS: 100 INJECTION, SOLUTION INTRAVENOUS; SUBCUTANEOUS at 08:41

## 2019-05-01 RX ADMIN — FUROSEMIDE 20 MG: 20 TABLET ORAL at 08:38

## 2019-05-01 RX ADMIN — OXYCODONE HYDROCHLORIDE AND ACETAMINOPHEN 2 TABLET: 7.5; 325 TABLET ORAL at 08:38

## 2019-05-01 RX ADMIN — FAMOTIDINE 20 MG: 20 TABLET ORAL at 08:38

## 2019-05-01 RX ADMIN — INSULIN LISPRO 5 UNITS: 100 INJECTION, SOLUTION INTRAVENOUS; SUBCUTANEOUS at 08:41

## 2019-05-01 ASSESSMENT — PAIN DESCRIPTION - PAIN TYPE: TYPE: ACUTE PAIN

## 2019-05-01 ASSESSMENT — PAIN SCALES - GENERAL
PAINLEVEL_OUTOF10: 7
PAINLEVEL_OUTOF10: 7

## 2019-05-01 ASSESSMENT — PAIN DESCRIPTION - LOCATION: LOCATION: LEG

## 2019-05-01 ASSESSMENT — PAIN DESCRIPTION - ORIENTATION: ORIENTATION: RIGHT

## 2019-05-01 NOTE — PROGRESS NOTES
Right 07/20/2016    LEG AMPUTATION BELOW KNEE Right 4/4/2019    I&D AND REVISION OF AMPUTATION RIGHT LEG performed by Sylvie Valdivia MD at [address] Right 1/14/15    sole of foot I&D    DC DRAIN INFECT SHOULDER BURSA Left 8/18/2017    LEFT SHOULDER INCISION AND DRAINAGE performed by Sylvie Valdivia MD at [address] [facility] OFFICE/OUTPT [address] Right 9/20/2018    EXCISIONAL DEBRIDEMENT RIGHT BKA STUMP performed by Oracio Lambert MD at [address] Right 1/16/15    2nd toe with wound vac applied    WISDOM TOOTH EXTRACTION  ? when       Restrictions/Precautions:  Fall Risk, Weight Bearing, General Precautions     Right Lower Extremity Weight Bearing: Non Weight Bearing              Other position/activity restrictions: right AKA        Prior Level of Function:  ADL Assistance: Independent  Homemaking Assistance: Independent  Ambulation Assistance: Independent(wheelchair)  Transfer Assistance: Independent  Additional Comments: Pt reporting he was on TCU back in Feb 2019. Pt stating he was indep with ADL tasks at home. His wheelchair will fit through all his doorways at home. Subjective:     Subjective: Pt. in [facility] upon arrival and agrees to therapy session. Pt. states he is not feeling well this morning and has had diarrhea since waking up. Pt. slow moving this session requiring extra time to complete tasks. Pain:  Yes.   Pain Assessment  Pain Assessment: (Not rated)  Pain Type: Acute pain  Pain Location: Leg  Pain Orientation: Right       Social/Functional:  Lives With: Other (comment)(Roommate)  Type of Home: House  Home Layout: Two level, Able to Live on Main level with bedroom/bathroom  Home Access: Ramped entrance  Home Equipment: Wheelchair-manual, Rolling walker(wheelchair not in good shape)     Objective:  Rolling to Left: Modified independent  Supine to Sit: Modified independent  Sit to Supine: Modified independent    Transfers  Sit to Stand: Modified independent  Stand to sit: Modified independent  Stand Pivot Transfers: Modified independent  Car Transfer: Modified independent       Ambulation 1  Surface: level tile  Device: Rolling Walker  Assistance: Modified Independent  Quality of Gait: Good aiyana. Slight foward flexed posture. Pt. with no LOB. Pt. limited by pain and ill-feeling this session and declines further ambulation. Distance: 30' x 1              Wheelchair Activities  Propulsion: Yes  Propulsion 1  Propulsion: Manual  Level: Level Tile  Method: RUE;LUE  Level of Assistance: Modified independent  Description/ Details: Good propulsion speed and manueverability. Distance: 150' x 1, Community distance  Propulsion 2  Propulsion: Manual  Level: Ramp  Level of Assistance: Modified independent  Description/ Details: Decreased propulsion speed. Pt. fatigued after completion. Good technique  Distance: 25' ramp       Exercises:  Exercises  Comments: Reviewed and completed HEP consisting of seated L LE ankle pumps and long arc quads, B LE marches, hip abd/add, and glute sets, supine B LE hip flexion, hip abd/add, glut sets, positional stretch laying flat, and sidelying R hip abd. Pt. unable to lay prone secondary to upset stomach. All exercises completed to increase strength and improve functional mobility. Activity Tolerance:  Activity Tolerance: Patient Tolerated treatment well;Patient limited by fatigue;Patient limited by endurance; Patient limited by pain    Assessment: Body structures, Functions, Activity limitations: Decreased functional mobility , Decreased ADL status, Decreased strength, Decreased endurance, Decreased balance  Assessment: Pt. tolerated session fairly well. Pt. pleasant and cooperative but limited by upset stomach and pain. Pt. not current on pain meds during session. Pt. unable to tolerate further ambulation distance this session. Prognosis: Good      PT Assessment: Pt has met 5/5 STGs and 3/4 LTGs.  He has made substantial progress with all mobility during his stay and has been able to safely complete all mobility required within his home. The pt will be discharged to home with an HEP for self performance to further improve strength and mobility in prep for future prosthetic. The pt voiced no concerns with returning home at this time and agreed with this plan during his stay    Discharge Recommendations:  Discharge Recommendations: Continue to assess pending progress    Patient Education:  Patient Education: gait, HEP, POC    Equipment Recommendations:  Equipment Needed: Yes(w/c, has RW)    Safety:  Type of devices:  All fall risk precautions in place, Left in chair, Call light within reach, Nurse notified, Gait belt    Plan:  Times per week: 5x/wk 90 min, 1x/wk 30 min  Current Treatment Recommendations: Strengthening, Transfer Training, Wheelchair Mobility Training, Patient/Caregiver Education & Training, Functional Mobility Training, Safety Education & Training, Positioning, Gait Training, Balance Training, Endurance Training, ADL/Self-care Training, Stair training, Home Exercise Program, Equipment Evaluation, Education, & procurement    Goals:  Patient goals : go home    Short term goals  Time Frame for Short term goals: 1 week  Short term goal 1: Pt to perform supine to/from sit at mod I to get in and out of bed  MET, SEE LTG  Short term goal 2: Pt to perform sit to/from stand at S with proper hand placement to rise from bed or chair  MET, SEE LTG  Short term goal 3: Pt to perform stand pivot with RW at SBA to get to and from bed and wheelchair  MET, SEE LTG  Short term goal 4: Pt to ambulate 30ft with RW at SBA to walk to and from restroom  MET, SEE LTG  Short term goal 5: Pt to propel wheelchair community distances and up and down 25 ft ramp at mod I for functional mobility  MET    Long term goals  Time Frame for Long term goals : 2 weeks  Long term goal 1: Pt to perform sit to/from stand at mod I with proper hand placement to rise from bed or chair  MET  Long term goal 2: Pt to perform stand pivot with RW at mod I to get to and from bed and wheelchair  MET  Long term goal 3: Pt to ambulate 50ft with RW at mod I to walk to and from restroom  NOT MET  Long term goal 4: Pt to negotiate car transfer at S for transport to and from medical appts  MET  If patient is discharged prior to progress note completion, this note is to serve as the discharge note with all goals being unmet unless indicated otherwise. Treatment session and note completed by Nik Don PTA.   Assessment and goal revision of Discharge Note completed by Adrian Carrero PT.

## 2019-05-01 NOTE — PROGRESS NOTES
Physical Medicine & Rehabilitation  Progress Note    Late entry for 4/29    Chief Complaint:  RIGHT above knee amputation    Subjective:  Does not feel pain is well controlled; especially above incision. He is agreeable to have Dr. Gabe Zambrano see him to make recommendations for his insulin regimen. No specific concerns. Rehabilitation:  Physical therapy: FIMS:  Bed Mobility: Scooting: Modified independent    Transfers: Sit to Stand: Modified independent  Stand to sit: Modified independent  Bed to Chair: Modified independent  Stand Pivot Transfers: Contact guard assistance(pt bernstein w/c in front of toilet and turns using secure handholds to and from toilet)  Comment: Pt. able to pick object up from floor using a long handled reacher with SBA. , Ambulation 1  Surface: level tile  Device: Rolling Walker  Assistance: Stand by assistance  Quality of Gait: Quick aiyana with cues to slow down for safety. Pt. slightly unsteady at times but able to correct all LOBs. Pt. limited by fatigue and pain. Distance: 48' x 1  Comments: Pt. limited by pain this session, unable to complete longer distance, Stairs  # Steps : 1  Rails: Bilateral(// bars)  Curbs: 6\"  Assistance: Contact guard assistance  Comment: Pt. ascended/descended 6\" platform step in // bars. Pt. hesitant to complete. Slightly unsteady but no LOB.      FIMS: Bed, Chair, Wheel Chair: 6 - Requires assistive device (slide rail)  Walk: 2 - Maximal Assistance Requires up to Maximal Assistance AND requires assistance of one person to walk between  feet (Patient performs 25-49% of locomotion effort or goes between  feet)  Distance Walked: 50'  Wheel Chair: 6 - [facility] Operates wheelchair at least 150 feet with an ambulatory device, orthosis or prosthesis OR requires extra amount of time OR there is concern for safety  Distance Traveled in Wheel Chair: 500'  Stairs: 1- Total Assistance perfoms less than 25% of the effort, or requirs the assistance Value Ref Range    POC Glucose 128 (H) 70 - 108 mg/dl   POCT Glucose    Collection Time: 04/30/19  5:15 PM   Result Value Ref Range    POC Glucose 166 (H) 70 - 108 mg/dl     Impression:  1. S/p RIGHT above knee amputation for osteomyelitis of prior below knee amputation non healing wound by Dr. Hanna on 4/18/2019.  2. Gait instability. 3. S/p incision and drainage with saucerization of RIGHT tibia and fibula with poorly healing wound and osteomyelitis with Streptococcus agalactiae by Dr. Ashley Carr on 4/4/2019.  4. S/p RIGHT below knee amputation for osteomyelitis of the RIGHT foot on 7/20/2016.  5. Type 2 DM, poorly controlled.  Last hemoglobin A1c was 9.1 on 4/2/2019.  Range 8.1 to 12.2 from 2015 to 2019. 6. Post operative blood loss anemia with initial Hgb of 11.7 on 4/16 down to 10.5 on 4/23.  7. Hyponatremia. 8. Leukocytosis. 9. Diabetic polyneuropathy. 10. Hypertension. 11. Diabetic macular edema bilaterally. 12. Gastroesophageal reflux disease. 13. Bipolar 2 disorder. 14. Non compliance with medical treatment. 15. History of marijuana abuse. 16. Hypovitaminosis D. 17. Lower limb edema. Plan:     Medical management: Per primary team.     Consultants:  Orthopedic Surgery, Infectious Disease, Physical Medicine     Narcotic usage:  Percocet Last 24 hour usage 30mg. Increased. Last BM:  Stool Amount: Medium (04/28/19 1939)  Ambulation/Mobility:  Quality of Gait: Quick aiyana with cues to slow down for safety. Pt. slightly unsteady at times but able to correct all LOBs. Pt. limited by fatigue and pain. Distance: 48' x 1  Quality of Gait 2: Pt. ambulating on gravel path. Cues for correct technique and safety. Pt. slightly unsteady but with no LOB. Cues to slow down for safety. Distance: 12' x 1    Wheelchair Activities  Propulsion: Yes  Distance: 150' x 1, 500' x 1 including 25' ramp     Acute/Rehabilitation Problems:  1.  S/p RIGHT above knee amputation for osteomyelitis of prior below knee

## 2019-05-01 NOTE — PROGRESS NOTES
Physical Medicine & Rehabilitation  Progress Note      Chief Complaint:  RIGHT above knee amputation    Subjective:  Pain better today with ace wrap on residual limb and with increase in pain medications. Looking forward to going home. \"My sugars bottomed out again today. \"  Very minimal RLL edema. No specific concerns. Rehabilitation:  Physical therapy: FIMS:  Bed Mobility: Scooting: Modified independent    Transfers: Sit to Stand: Modified independent  Stand to sit: Modified independent  Bed to Chair: Modified independent  Stand Pivot Transfers: Contact guard assistance(pt bernstein w/c in front of toilet and turns using secure handholds to and from toilet)  Comment: Pt. able to pick object up from floor using a long handled reacher with SBA. , Ambulation 1  Surface: level tile  Device: Rolling Walker  Assistance: Stand by assistance  Quality of Gait: Quick aiyana with cues to slow down for safety. Pt. slightly unsteady at times but able to correct all LOBs. Pt. limited by fatigue and pain. Distance: 48' x 1  Comments: Pt. limited by pain this session, unable to complete longer distance, Stairs  # Steps : 1  Rails: Bilateral(// bars)  Curbs: 6\"  Assistance: Contact guard assistance  Comment: Pt. ascended/descended 6\" platform step in // bars. Pt. hesitant to complete. Slightly unsteady but no LOB.      FIMS: Bed, Chair, Wheel Chair: 6 - Requires assistive device (slide rail)  Walk: 2 - Maximal Assistance Requires up to Maximal Assistance AND requires assistance of one person to walk between  feet (Patient performs 25-49% of locomotion effort or goes between  feet)  Distance Walked: 50'  Wheel Chair: 6 - [address] [place] Operates wheelchair at least 150 feet with an ambulatory device, orthosis or prosthesis OR requires extra amount of time OR there is concern for safety  Distance Traveled in Wheel Chair: 500'  Stairs: 1- Total Assistance perfoms less than 25% of the effort, or requirs the Systems:  CONSTITUTIONAL:  negative  EYES:  positive for visual disturbance  HEENT:  negative  RESPIRATORY:  negative  CARDIOVASCULAR:  negative  GASTROINTESTINAL:  negative  GENITOURINARY:  negative  SKIN:  negative  HEMATOLOGIC/LYMPHATIC:  negative  MUSCULOSKELETAL:  positive for  myalgias, arthralgias, pain and bone pain  NEUROLOGICAL:  positive for visual disturbance, pain and tingling  BEHAVIOR/PSYCH:  positive for Bipolar 2 disorder  10 point system review otherwise negative    Objective:  /66   Pulse 104   Temp 95.4 °F (35.2 °C) (Oral)   Resp 20   Ht 5' 6\" (1.676 m)   Wt 213 lb 7 oz (96.8 kg)   SpO2 98%   BMI 34.45 kg/m²     awake  Orientation:   person, place, time, situation  Mood: within normal limits  Affect: flat  General appearance:  in no acute distress, RIGHT AKA, up in [facility]     Memory:   grossly normal  Attention/Concentration: normal  Language:  normal     ROM:  normal NWB in RLL  Motor Exam:  Motor exam is 5 out of 5 all extremities with the exception of only RIGHT hip being testable     Sensory:  Touch:  Left Lower Extremity:  abnormal - over LEFT foot  Coordination:   normal     Skin: warm and dry, no rash or erythema and RIGHT thigh incision with staple closure C/D/I  Peripheral vascular: Pulses: Normal upper and lower extremity pulses with RIGHT LL not testable; Edema: trace in RIGHT LL starting just above ankle    Diagnostics:   Recent Results (from the past 24 hour(s))   POCT glucose    Collection Time: 04/29/19  9:48 PM   Result Value Ref Range    POC Glucose 161 (H) 70 - 108 mg/dl   POCT glucose    Collection Time: 04/30/19  8:29 AM   Result Value Ref Range    POC Glucose 114 (H) 70 - 108 mg/dl   POCT glucose    Collection Time: 04/30/19 11:21 AM   Result Value Ref Range    POC Glucose 58 (L) 70 - 108 mg/dl   POCT Glucose    Collection Time: 04/30/19 11:39 AM   Result Value Ref Range    POC Glucose 73 70 - 108 mg/dl   POCT glucose    Collection Time: 04/30/19 12:27 PM   Result planning.     Chronic Problems:  1. Diabetic polyneuropathy. 2. Hypertension. 1. No current medications. 3. Diabetic macular edema bilaterally. 4. Gastroesophageal reflux disease. 1. Pepcid. 5. Bipolar 2 disorder. 1. Sertraline. 2. Mirtazapine for sleep. 6. Non compliance with medical treatment.     Labs:  1. 4/23.  2. 4/29.     Infectious Disease:  1. N/A. Missed Therapy Time:  None     DME:  Dena Ireland was evaluated today and a DME order was entered for a lightweight wheelchair because he requires this to successfully complete daily living tasks of bathing, toileting, personal cares, grooming and hygiene. A lightweight wheelchair is necessary due to the patient's impaired ambulation and mobility restrictions. The patient would not be able to resolve these daily living tasks using a cane or walker. The patient can self-propel a lightweight wheelchair safely in their home and can maneuver within their home with adequate access. The patient cannot self-propel in a standard wheelchair. The need for this equipment was discussed with the patient and he understands, is in agreement, and has not expressed an unwillingness to use the wheelchair. Dena Ireland can self propel a wheelchair and needs anti-rollback device because of ramps. The patient requires a standard cushion for his wheelchair due to prolonged time in the wheelchair during the day to prevent skin breakdown.     Discharge Plan:  Estimated Discharge Date: 5/1/2019   Destination: Home  Services at Discharge: Physical Therapy and Occupational Therapy HEP  Is patient appropriate for an outpatient driving evaluation? no  Equipment at Discharge: Yves Morales MD

## 2019-05-01 NOTE — PROGRESS NOTES
[facility]  Recreational Therapy  Discharge Note  Inpatient Rehabilitation Unit           Date:  5/1/2019       Patient Name: Rox Reveles      MRN: [id number]       YOB: 1970 (44 y.o.)       Gender: male  Diagnosis: Above knee amputation of right lower extremity   Referring Practitioner: Dr. Ekaterina Khan     Patient discharged from Recreational Therapy at this time. See recreational therapy notes for details.     Electronically signed by: GERMANIA Newby  Date: 5/1/2019

## 2019-05-01 NOTE — FLOWSHEET NOTE
[address]  DISCHARGE NOTE    Date: 2019  Patient Name: Thanh Cruz,   Gender: male      MRN: [id number]  : 1970  (44 y.o.)  Referring Practitioner: Dr. Elzbieta Rogers   Diagnosis: Above knee amputation of right lower extremity   Additional Pertinent Hx: 44 y.o. 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 He was admitted 2019 for for complaint of pain secondary to a wound in his prior below knee amputation. He originally had a RIGHT below-knee amputation for persistent osteomyelitis in his RIGHT foot on 2016 and subsequently developed a poorly healing wound and osteomyelitis with Streptococcus agalactiae. On 2019 he underwent incision and drainage with saucerization of the RIGHT tibia and fibula by Dr. Kassi Strange and a wound VAC was placed and he was discharged to home with home health with IV Rocephin. He had low-grade temperatures and leukocytosis noted at home by the home health nurse and he was referred to come back into the emergency department for evaluation. On 2019 he was agreeable to having a RIGHT above-knee amputation by Dr. Kassi Strange. The patient is a poorly controlled type II diabetic and has not had controlled hemoglobin A1c values at any time from  2 .     Restrictions/Precautions:  Restrictions/Precautions: Fall Risk, Weight Bearing, General Precautions    Right Lower Extremity Weight Bearing: Non Weight Bearing    Position Activity Restriction  Other position/activity restrictions: right AKA        Past Medical History:   Diagnosis Date    Bipolar 2 disorder ([address]     previously followed with Dr. Baudilio Villafuerte and Rama Castaneda in [facility] Diabetes mellitus type 2, uncontrolled ([address]     HgbA1c on 4/2/2019 was 9.1.     Diabetic polyneuropathy ([address]     Diabetic ulcer of right foot associated with type 2 diabetes mellitus ([address] 12/10/2015    Essential hypertension     \"never been on b/p medication that I know of\"    GERD (gastroesophageal reflux disease)     Hammer toe of left foot     Heart murmur     denies any chest pain or palpitations    History of tobacco abuse     Hx of AKA (above knee amputation), right ([address] 04/18/2019    Dr. Blevins Both edema, diabetic, bilateral ([address] 05/04/2018    Dr. 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 ([address] 6/29/2016     Past Surgical History:   Procedure Laterality Date    ABSCESS DRAINAGE Right     foot    AMPUTATION ABOVE KNEE Right 4/18/2019    RIGHT ABOVE KNEE AMPUTATION performed by Bushra Ann MD at [address] Right 07/01/2016    I & D    INCISION AND DRAINAGE Right 2/18/2019    I&D RIGHT STUMP performed by Bushra Ann MD at [address] [facility] Right 07/20/2016    LEG AMPUTATION BELOW KNEE Right 4/4/2019    I&D AND REVISION OF AMPUTATION RIGHT LEG performed by Bushra Ann MD at [address] [facility] [address] Right 1/14/15    sole of foot I&D    ME DRAIN INFECT SHOULDER BURSA Left 8/18/2017    LEFT SHOULDER INCISION AND DRAINAGE performed by Bushra Ann MD at [address] [facility] OFFICE/OUTPT [facility] Right 9/20/2018    EXCISIONAL DEBRIDEMENT RIGHT BKA STUMP performed by Doyne Heimlich, MD at [address] Right 1/16/15    2nd toe with wound vac applied    WISDOM TOOTH EXTRACTION  ? when        OT FIM ASSESSMENT:   Admission score Current score Goal Goal Status   EATING 7 - Patient feeds self 7 - Patient feeds self 7 Met     GROOMING 5 - Requires setup/cues to do all tasks(set up to use mouthwash ) 7 - Patient independent with all grooming tasks(washing face and hands, using baby powder, underarm deoderant ) 7 Met     BATHING 4 - Able to bathe 8-9 areas(CGA while standing to wash bottom, A to wash L foot ) 5 - Able to bathe all 10 areas with setup/sup/cues 6 Not Met     UPPER EXTREMITY DRESSING 5 - Requires setup/supervision/cues and/or requires assist with presthesis/brace only(SBA to don shirt seated in wheelchair ) 7 - Patient independently dresses upper body(donning and doffing pullover shirt) 7 Met     LOWER EXTREMITY DRESSING 3 - Requires assist with 2-3 parts of dressing(A to thread clothing, A with L shoe, A to pull up pants ) 3 - Requires assist with 2-3 parts of dressing(Donning Sock, pulling pants up over hips and Shoe) 6 Not Met     TOILETING 5 - Requires setup/supervision/cues(Pt used urinal with set up ) 6 - Requires device (grab bar/walker/etc.)(per RN staff) 6 Met     TOILET TRANSFER 4 - Requires steadying assistance only < 25% assist(CGA sit pivot to and from standard toilet using grab bar (pt does not have one at home) ) 6 - Independent with device (grab bar/walker/slide bar)(Per Rn staff) 6 Met     TUB/SHOWER TRANSFER 0 - Activity does not occur         6 - Modified independence(using grab bars stand pivoting into and out of shower to extended shower bench)         6 Met       Assessment:  Assessment: Patient is currently completing functional transfers with mod I completes UB ADLs  for LB ADLs. Patient has made good progress on rehab, but is currently limited by endurance at times. Pt to be discharged home with an HEP that he demo's comptency in. Equipment Recommendations:   Other: Pt has a tub transfer bench. Uses a standard toilet, but denies needs. Recommend grab bar near toilet instead of using towel bar. Plan:  Discharge patient to home with HEP    Goals:  Short term goals  Time Frame for Short term goals: 1 week   Short term goal 1: Pt will tolerate standing 1 min with S while releasing 1 UE from walker to improve balance needed to manage clothing. GOAL MET   Short term goal 2: Pt will complete sit pivot transfer using safe technique with SBA to improve safety when transferring to toilet. GOAL MET   Short term goal 3: Pt will don shorts with Mod I to improve indep with getting dressed at home. GOAL NOT MET   Short term goal 4: Pt will preform IADL task from wheelchair level with mod I and no cues for safety to improve indep within home. GOAL MET     Long term goals  Time Frame for Long term goals : 2 weeks   Long term goal 1: Pt will complete shower and dressing routine with mod I to return to prior level of independence. GOAL MET   Long term goal 2: Pt will complete BUE HEP using handout with Mod I to improve UB strength to 5/5.   GOAL MET

## 2019-05-01 NOTE — PLAN OF CARE
Problem: DISCHARGE BARRIERS  Goal: Patient's continuum of care needs are met  Note:   Patient signed, \"An Important Message from Medicare About Your Rights\". Patient aware that Judit Dorantes from [id number] [place] [address] will be following up to proceed with stump shaping and prosthesis. Patient familiar with Judit Dorantes from previous amputation.

## 2019-05-01 NOTE — PROGRESS NOTES
Department of Internal Medicine  Section of Endocrinology   [address]    [address] , [address] [place], [address]  Office Phone Shay[address]  ([address])  ([address])  [address] Swetha Brink MD, Fellow of [employer] of Endocrinology   Progress Note    Admit Date: 4/22/2019    Reviewed the chart of the last 24 hours:   SUBJECTIVE:   No chief complaint on file. Above knee amputation of right lower extremity ([facility] [Z89.611]   Patient Active Problem List   Diagnosis Code    Status post below knee amputation of right lower extremity ([facility] Z89.511    Gait disturbance R26.9    Sebaceous cyst L72.3    Uncontrolled type 2 diabetes mellitus with hyperglycemia, with long-term current use of insulin ([facility]) E11.65, Z79.4    Severe bipolar II disorder, recent episode major depressive, remission ([facility]) F31.81    Bipolar 1 disorder ([facility]) F31.9    Normocytic anemia D64.9    Obesity (BMI 30-39. 9) E66.9    History of noncompliance with medical treatment Z91.19    Essential hypertension I10    Tobacco dependence F17.200    Gastroesophageal reflux disease without esophagitis K21.9    History of marijuana use Z87.898    Physical debility R53.81    Anemia D64.9    Weakness R53.1    Infection of above knee amputation stump of right leg ([facility]) T87.43    Above knee amputation of right lower extremity ([facility] Z89.611     <principal problem not specified>  4/22/2019  5:30 PM  Admission weight: 211 lb 2 oz (95.8 kg)  [id number]  [id number]  [id number]/068-A     Patient has  complaint of hypoglycemia. .   Medication side effects: none  Patient is eating 100%    Review of Systems  Review of Systems - General ROS: negative   Psychological ROS: negative   Hematological and Lymphatic ROS: No history of blood clots or bleeding disorder.    Respiratory ROS: no cough, shortness of breath, or wheezing maternal grandfather; High Blood Pressure in his mother; High Cholesterol in his mother; Other in his mother; Vision Loss in his maternal grandmother. Allergies   Allergen Reactions    Pcn [Penicillins] Shortness Of Breath, Nausea And Vomiting and Other (See Comments)     Does not remember reactions. Has TOLERATED amoxicillin and several different cephalosporins.      Clindamycin/Lincomycin Itching     Current Facility-Administered Medications   Medication Dose Route Frequency Provider Last Rate Last Dose    insulin glargine (LANTUS) injection vial 48 Units  48 Units Subcutaneous QAM Robson Pathak MD        insulin lispro (HUMALOG) injection vial 5-20 Units  5-20 Units Subcutaneous TID AC Robson Pathak MD        insulin lispro (HUMALOG) injection vial 0-6 Units  0-6 Units Subcutaneous TID WC Robson Pathak MD        insulin lispro (HUMALOG) injection vial 0-3 Units  0-3 Units Subcutaneous Nightly Robson Pathak MD        sodium chloride flush 0.9 % injection 10 mL  10 mL Intravenous BID Silvana Li MD   10 mL at 04/30/19 2212    oxyCODONE-acetaminophen (PERCOCET) 7.5-325 MG per tablet 1 tablet  1 tablet Oral Q4H PRN Silvana Li MD        Or    oxyCODONE-acetaminophen (PERCOCET) 7.5-325 MG per tablet 2 tablet  2 tablet Oral Q4H PRN Silvana Li MD   2 tablet at 04/30/19 2209    furosemide (LASIX) tablet 20 mg  20 mg Oral Daily Silvana Li MD   20 mg at 04/30/19 4742    potassium chloride (KLOR-CON M) extended release tablet 20 mEq  20 mEq Oral Daily with breakfast Silvana Li MD   20 mEq at 04/30/19 1527    vitamin D (CHOLECALCIFEROL) tablet 5,000 Units  5,000 Units Oral BID Silvana Li MD   5,000 Units at 04/30/19 2208    famotidine (PEPCID) tablet 20 mg  20 mg Oral Daily Silvana Li MD   20 mg at 04/30/19 0835    dextrose 5 % solution  100 mL/hr Intravenous PRN Rena Cantrell MD        dextrose 50 % solution 12.5 g  12.5 g Intravenous PRN Marilou Valdivia MD Jacques        glucagon (rDNA) injection 1 mg  1 mg Intramuscular PRN Tish Perez MD        glucose (GLUTOSE) 40 % oral gel 15 g  15 g Oral PRN Tish Perez MD        acetaminophen (TYLENOL) tablet 650 mg  650 mg Oral Q4H PRN Tish Perez MD        cyclobenzaprine (FLEXERIL) tablet 10 mg  10 mg Oral TID PRN Tish Perez MD   10 mg at 04/29/19 0714    magnesium hydroxide (MILK OF MAGNESIA) 400 MG/5ML suspension 30 mL  30 mL Oral Daily PRN Tish Perez MD        mirtazapine (REMERON) tablet 15 mg  15 mg Oral Nightly Tish Perez MD   15 mg at 04/30/19 2208    sertraline (ZOLOFT) tablet 150 mg  150 mg Oral Daily Tish Perez MD   150 mg at 04/30/19 0392    docusate sodium (COLACE) capsule 100 mg  100 mg Oral BID Rik Fiore MD   100 mg at 04/30/19 3811    senna (SENOKOT) tablet 17.2 mg  2 tablet Oral Nightly Rik Fiore MD   17.2 mg at 04/29/19 2250    ondansetron (ZOFRAN) tablet 4 mg  4 mg Oral Q8H PRN Rik Fiore MD         Home Meds:   Prior to Admission medications    Medication Sig Start Date End Date Taking?  Authorizing Provider   mirtazapine (REMERON) 15 MG tablet Take 1 tablet by mouth nightly 4/30/19  Yes Rik Fiore MD   sertraline (ZOLOFT) 100 MG tablet Take 1.5 tablets by mouth daily 4/30/19  Yes Rik Fiore MD   insulin glargine [facility]) 100 UNIT/ML injection pen Inject 55 Units into the skin 2 times daily 4/30/19  Yes Rik Fiore MD   insulin lispro (HUMALOG) 100 UNIT/ML injection vial Inject 18 Units into the skin 3 times daily (before meals) 4/30/19  Yes Rik Fiore MD   blood glucose monitor kit and supplies Use to test blood sugars DX:E11.65 4/30/19  Yes Rik Fiore MD   Continuous Blood Gluc  (FREESTYLE MARIANELA READER) LIANNE 1 Units by Does not apply route continuous 4/30/19  Yes Rik Fiore MD   Lancets MISC Use to test blood sugars 4 times daily DX:E11.65 4/30/19  Yes Rik Fiore MD   Insulin Syringe-Needle U-100 29G X 1/2\" 0.3 ML MISC 1 each by Does not apply route 4 times daily (after meals and at bedtime) 4/30/19  Yes Aurelio Hughes MD   cyclobenzaprine (FLEXERIL) 10 MG tablet Take 1 tablet by mouth 3 times daily as needed for Muscle spasms 4/30/19 5/10/19 Yes Aurelio Hughes MD   vitamin D-3 (CHOLECALCIFEROL) 5000 units TABS Take 1 tablet by mouth daily 4/30/19  Yes Aurelio Hughes MD   oxyCODONE-acetaminophen (PERCOCET) 7.5-325 MG per tablet Take 1 tablet by mouth every 4 hours as needed for Pain for up to 7 days.  4/30/19 5/7/19 Yes Aurelio Hughes MD   blood glucose monitor strips Accucheck Sheila Test Strips Test blood sugars 4 times daily DX:E11.65 10/23/18   MARILYN Pineda - CNP   acetaminophen (TYLENOL) 500 MG tablet Take 1,000 mg by mouth every 6 hours as needed for Pain    Historical Provider, MD     Scheduled Meds:   insulin glargine  48 Units Subcutaneous QAM    insulin lispro  5-20 Units Subcutaneous TID AC    insulin lispro  0-6 Units Subcutaneous TID WC    insulin lispro  0-3 Units Subcutaneous Nightly    sodium chloride flush  10 mL Intravenous BID    furosemide  20 mg Oral Daily    potassium chloride  20 mEq Oral Daily with breakfast    vitamin D  5,000 Units Oral BID    famotidine  20 mg Oral Daily    mirtazapine  15 mg Oral Nightly    sertraline  150 mg Oral Daily    docusate sodium  100 mg Oral BID    senna  2 tablet Oral Nightly     Continuous Infusions:   dextrose       PRN Meds:oxyCODONE-acetaminophen **OR** oxyCODONE-acetaminophen, dextrose, dextrose, glucagon (rDNA), glucose, acetaminophen, cyclobenzaprine, magnesium hydroxide, ondansetron    OBJECTIVE        Physical    VITALS:  /73   Pulse 100   Temp 98.1 °F (36.7 °C)   Resp 12   Ht 5' 6\" (1.676 m)   Wt 217 lb 11.2 oz (98.7 kg)   SpO2 91%   BMI 35.14 kg/m²   CONSTITUTIONAL:  awake, alert, cooperative, no apparent distress, and appears stated age    DATA  TSH:    Lab Results   Component gms)/meal  Impression:    He had hypoglycemia yesterday. Will reduce Insulin further.  Lantus 48 U daily, Humalog 1 unit for 10 grams of carbs plus low dose sliding scale TID AC  I will order a Arturo Glucose sensor for him    ASSESSMENT AND PLAN

## 2019-05-01 NOTE — PROGRESS NOTES
CLINICAL PHARMACY NOTE: MEDS TO [address] Select Patient?: No  Total # of Prescriptions Filled: 9   The following medications were delivered to the patient:    Total # of Interventions Completed: 2  Time Spent (min): 60    Additional Documentation:

## 2019-05-28 ENCOUNTER — NURSE TRIAGE (OUTPATIENT)
Dept: ADMINISTRATIVE | Age: 49
End: 2019-05-28

## 2019-05-29 NOTE — TELEPHONE ENCOUNTER
Summary: blood sugar    Blood sugar out of control, doing everything he's suppose to, gets dizzy laying in bed          Reason for Disposition   Blood glucose > 400 mg/dl (22 mmol/l)    Answer Assessment - Initial Assessment Questions  1. BLOOD GLUCOSE: \"What is your blood glucose level? \"       429. States he has been drinking water all day. 2. ONSET: \"When did you check the blood glucose? \"     All day today   3. USUAL RANGE: \"What is your glucose level usually? \" (e.g., usual fasting morning value, usual evening value)      200.   4. KETONES: \"Do you check for ketones (urine or blood test strips)? \" If yes, ask: \"What does the test show now? \"      No   5. TYPE 1 or 2:  \"Do you know what type of diabetes you have? \"  (e.g., Type 1, Type 2, Gestational; doesn't know)       Type 2   6. INSULIN: \"Do you take insulin? \" If yes, ask: \"Have you missed any shots recently? \"      Humalog  7. DIABETES PILLS: \"Do you take any pills for your diabetes? \" If yes, ask: \"Have you missed taking any pills recently? \"      No.   8. OTHER SYMPTOMS: \"Do you have any symptoms? \" (e.g., fever, frequent urination, difficulty breathing, dizziness, weakness, vomiting)     Feeling very dizzy at times. 9. PREGNANCY: \"Is there any chance you are pregnant? \" \"When was your last menstrual period? \"      Male    Protocols used: DIABET[facility] - [facility] BLOOD SUG[facility]

## 2019-06-03 ENCOUNTER — HOSPITAL ENCOUNTER (EMERGENCY)
Age: 49
Discharge: HOME | DRG: 564 | End: 2019-06-03
Payer: MEDICARE

## 2019-06-03 ENCOUNTER — APPOINTMENT (OUTPATIENT)
Dept: GENERAL RADIOLOGY | Age: 49
DRG: 564 | End: 2019-06-03
Payer: MEDICARE

## 2019-06-03 VITALS
DIASTOLIC BLOOD PRESSURE: 73 MMHG | WEIGHT: 215 LBS | SYSTOLIC BLOOD PRESSURE: 128 MMHG | RESPIRATION RATE: 16 BRPM | TEMPERATURE: 98.3 F | HEART RATE: 99 BPM | OXYGEN SATURATION: 95 % | BODY MASS INDEX: 34.7 KG/M2

## 2019-06-03 DIAGNOSIS — L03.115 CELLULITIS OF RIGHT LEG WITHOUT FOOT: Primary | ICD-10-CM

## 2019-06-03 DIAGNOSIS — E11.65 UNCONTROLLED TYPE 2 DIABETES MELLITUS WITH HYPERGLYCEMIA (HCC): ICD-10-CM

## 2019-06-03 LAB
ALBUMIN SERPL-MCNC: 3.7 G/DL (ref 3.5–5.1)
ALP BLD-CCNC: 112 U/L (ref 38–126)
ALT SERPL-CCNC: 8 U/L (ref 11–66)
ANION GAP SERPL CALCULATED.3IONS-SCNC: 19 MEQ/L (ref 8–16)
AST SERPL-CCNC: 11 U/L (ref 5–40)
BASOPHILS # BLD: 0.4 %
BASOPHILS ABSOLUTE: 0.1 THOU/MM3 (ref 0–0.1)
BILIRUB SERPL-MCNC: 0.3 MG/DL (ref 0.3–1.2)
BUN BLDV-MCNC: 11 MG/DL (ref 7–22)
CALCIUM SERPL-MCNC: 9.2 MG/DL (ref 8.5–10.5)
CHLORIDE BLD-SCNC: 93 MEQ/L (ref 98–111)
CO2: 21 MEQ/L (ref 23–33)
CREAT SERPL-MCNC: 0.8 MG/DL (ref 0.4–1.2)
EOSINOPHIL # BLD: 2 %
EOSINOPHILS ABSOLUTE: 0.3 THOU/MM3 (ref 0–0.4)
ERYTHROCYTE [DISTWIDTH] IN BLOOD BY AUTOMATED COUNT: 14.7 % (ref 11.5–14.5)
ERYTHROCYTE [DISTWIDTH] IN BLOOD BY AUTOMATED COUNT: 44.7 FL (ref 35–45)
GFR SERPL CREATININE-BSD FRML MDRD: > 90 ML/MIN/1.73M2
GLUCOSE BLD-MCNC: 368 MG/DL (ref 70–108)
GLUCOSE BLD-MCNC: 441 MG/DL (ref 70–108)
HCT VFR BLD CALC: 40.6 % (ref 42–52)
HEMOGLOBIN: 13.4 GM/DL (ref 14–18)
IMMATURE GRANS (ABS): 0.07 THOU/MM3 (ref 0–0.07)
IMMATURE GRANULOCYTES: 0.6 %
LYMPHOCYTES # BLD: 20.4 %
LYMPHOCYTES ABSOLUTE: 2.6 THOU/MM3 (ref 1–4.8)
MCH RBC QN AUTO: 27.6 PG (ref 26–33)
MCHC RBC AUTO-ENTMCNC: 33 GM/DL (ref 32.2–35.5)
MCV RBC AUTO: 83.5 FL (ref 80–94)
MONOCYTES # BLD: 7.6 %
MONOCYTES ABSOLUTE: 1 THOU/MM3 (ref 0.4–1.3)
NUCLEATED RED BLOOD CELLS: 0 /100 WBC
OSMOLALITY CALCULATION: 284.8 MOSMOL/KG (ref 275–300)
PLATELET # BLD: 251 THOU/MM3 (ref 130–400)
PMV BLD AUTO: 9.7 FL (ref 9.4–12.4)
POTASSIUM SERPL-SCNC: 4.3 MEQ/L (ref 3.5–5.2)
RBC # BLD: 4.86 MILL/MM3 (ref 4.7–6.1)
SEG NEUTROPHILS: 69 %
SEGMENTED NEUTROPHILS ABSOLUTE COUNT: 8.7 THOU/MM3 (ref 1.8–7.7)
SODIUM BLD-SCNC: 133 MEQ/L (ref 135–145)
TOTAL PROTEIN: 8.1 G/DL (ref 6.1–8)
WBC # BLD: 12.6 THOU/MM3 (ref 4.8–10.8)

## 2019-06-03 PROCEDURE — 73552 X-RAY EXAM OF FEMUR 2/>: CPT

## 2019-06-03 PROCEDURE — 96372 THER/PROPH/DIAG INJ SC/IM: CPT

## 2019-06-03 PROCEDURE — 36415 COLL VENOUS BLD VENIPUNCTURE: CPT

## 2019-06-03 PROCEDURE — 85025 COMPLETE CBC W/AUTO DIFF WBC: CPT

## 2019-06-03 PROCEDURE — 82948 REAGENT STRIP/BLOOD GLUCOSE: CPT

## 2019-06-03 PROCEDURE — 6370000000 HC RX 637 (ALT 250 FOR IP): Performed by: EMERGENCY MEDICINE

## 2019-06-03 PROCEDURE — 99284 EMERGENCY DEPT VISIT MOD MDM: CPT

## 2019-06-03 PROCEDURE — 80053 COMPREHEN METABOLIC PANEL: CPT

## 2019-06-03 RX ORDER — SULFAMETHOXAZOLE AND TRIMETHOPRIM 800; 160 MG/1; MG/1
1 TABLET ORAL 2 TIMES DAILY
Qty: 20 TABLET | Refills: 0 | Status: SHIPPED | OUTPATIENT
Start: 2019-06-03 | End: 2019-06-13

## 2019-06-03 RX ORDER — CEPHALEXIN 500 MG/1
500 CAPSULE ORAL 4 TIMES DAILY
Qty: 40 CAPSULE | Refills: 0 | Status: SHIPPED | OUTPATIENT
Start: 2019-06-03 | End: 2019-07-15 | Stop reason: ALTCHOICE

## 2019-06-03 RX ORDER — ACETAMINOPHEN 325 MG/1
650 TABLET ORAL ONCE
Status: COMPLETED | OUTPATIENT
Start: 2019-06-03 | End: 2019-06-03

## 2019-06-03 RX ADMIN — ACETAMINOPHEN 650 MG: 325 TABLET ORAL at 09:50

## 2019-06-03 RX ADMIN — Medication 8 UNITS: at 11:06

## 2019-06-03 ASSESSMENT — PAIN DESCRIPTION - PROGRESSION: CLINICAL_PROGRESSION: GRADUALLY WORSENING

## 2019-06-03 ASSESSMENT — PAIN DESCRIPTION - LOCATION: LOCATION: OTHER (COMMENT);LEG

## 2019-06-03 ASSESSMENT — PAIN DESCRIPTION - ONSET: ONSET: ON-GOING

## 2019-06-03 ASSESSMENT — PAIN SCALES - GENERAL
PAINLEVEL_OUTOF10: 8

## 2019-06-03 ASSESSMENT — PAIN DESCRIPTION - PAIN TYPE: TYPE: ACUTE PAIN

## 2019-06-03 ASSESSMENT — ENCOUNTER SYMPTOMS
COUGH: 0
ABDOMINAL PAIN: 0
SHORTNESS OF BREATH: 0
COLOR CHANGE: 0

## 2019-06-03 ASSESSMENT — PAIN DESCRIPTION - DESCRIPTORS: DESCRIPTORS: SHARP

## 2019-06-03 ASSESSMENT — PAIN DESCRIPTION - ORIENTATION: ORIENTATION: RIGHT

## 2019-06-03 ASSESSMENT — PAIN DESCRIPTION - FREQUENCY: FREQUENCY: CONTINUOUS

## 2019-06-03 NOTE — CARE COORDINATION
ED Care Transition    6/3/2019    Patient Name: Ania Jamison   : 1970  MRN: [id number]    RUBINA Score: 3  PCP: No primary care provider on file. Specialist: yes - Dr. Trey Leung, Dr. Mac Hylton, Dr. Wang Offer ACC/CTC: no, [facility] referral - Diabetes                        Utilization Review:  ED visits: 12    6/3/19 -   19 - Right BKA cellulitis   19 - PICC line occlusion   19 - Right stump cellulitis   18 - wound infection - left fifth toe   18 - left fifth toe pain, diabetic wound   18 - Wound check left fifth toe   10/22/18 - Wound check Right BKA  10/17/18 - Hyperglycemia, HTN, palpitations   10/11/18 - Diarrhea   18 - hyperglycemia, SOB  18 - hyperglycemia     Admissions: 5    19-19 - Rehab   19-19 - S/p right AKA for osteomyelitis of prior BKA  19-4/10/19 - Infection right BKA stump   19-19 - Right BKA stump wound with cellulitis   19 - 3/12/19 - TCU  19-19 - RLL pneumonia   18-18 - Sepsis d/t infected amputation Right BKA, Hyperglycemia 18-10/4/18 - TCU     Problem List:  Patient Active Problem List   Diagnosis    Status post below knee amputation of right lower extremity ([address]    Gait disturbance    Sebaceous cyst    Uncontrolled type 2 diabetes mellitus with hyperglycemia, with long-term current use of insulin (HCC)    Severe bipolar II disorder, recent episode major depressive, remission ([address]    Bipolar 1 disorder (HCC)    Normocytic anemia    Obesity (BMI 30-39. 9)    History of noncompliance with medical treatment    Essential hypertension    Tobacco dependence    Gastroesophageal reflux disease without esophagitis    History of marijuana use    Physical debility    Anemia    Weakness    Infection of above knee amputation stump of right leg (HCC)    Above knee amputation of right lower extremity ([address]   Summary:  Met with Jacinto Hansen, introduced self/role.  Presented to ED for evaluation of

## 2019-06-03 NOTE — ED PROVIDER NOTES
to test blood sugars 4 times daily DX:E11.65      Insulin Syringe-Needle U-100 29G X 1/2\" 0.3 ML MISC 4 TIMES DAILY AFTER MEALS AND BEFORE BEDTIME Starting 2019, Disp-200 each, R-0, Normal      vitamin D-3 (CHOLECALCIFEROL) 5000 units TABS Take 1 tablet by mouth daily, Disp-30 tablet, R-0Normal      blood glucose monitor strips Accucheck Sheila Test Strips Test blood sugars 4 times daily DX:E11.65, Disp-400 strip, R-5, Normal      acetaminophen (TYLENOL) 500 MG tablet Take 1,000 mg by mouth every 6 hours as needed for PainHistorical Med       !! - Potential duplicate medications found. Please discuss with provider. ALLERGIES     is allergic to pcn [penicillins] and clindamycin/lincomycin. FAMILY HISTORY     indicated that his mother is . He reported the following about his father: unknown. He indicated that the status of his maternal grandmother is unknown. He indicated that the status of his maternal grandfather is unknown.   family history includes Arthritis in his maternal grandfather; Depression in his mother; Diabetes in his mother; Early Death in his mother; Heart Disease in his maternal grandfather; High Blood Pressure in his mother; High Cholesterol in his mother; Other in his mother; Vision Loss in his maternal grandmother. SOCIAL HISTORY      reports that he quit smoking about 34 years ago. His smoking use included cigars. He has a 65.00 pack-year smoking history. He has never used smokeless tobacco. He reports that he drank alcohol. He reports that he does not use drugs. PHYSICAL EXAM     INITIAL VITALS:  weight is 215 lb (97.5 kg). His oral temperature is 98.3 °F (36.8 °C). His blood pressure is 128/73 and his pulse is 99. His respiration is 16 and oxygen saturation is 95%. Physical Exam   Constitutional: He is oriented to person, place, and time. He appears well-developed and well-nourished. No distress. HENT:   Head: Normocephalic.    Eyes: Pupils are equal, round, and reactive to light. Neck: Normal range of motion. Cardiovascular: Normal rate, regular rhythm and normal heart sounds. Pulmonary/Chest: Effort normal and breath sounds normal.   Abdominal: Soft. Bowel sounds are normal.   Musculoskeletal:        Legs:  Neurological: He is alert and oriented to person, place, and time. Skin: Skin is warm and dry. Capillary refill takes less than 2 seconds. Psychiatric: He has a normal mood and affect. His behavior is normal.        DIFFERENTIAL DIAGNOSIS:   Cellulitis,abscess, low suspicion for osteomyelitis    DIAGNOSTIC RESULTS     EKG: All EKG's are interpreted by the Emergency Department Physician who either signs or Co-signs this chart in the absence of a cardiologist.    None    RADIOLOGY: non-plainfilm images(s) such as CT, Ultrasound and MRI are read by the radiologist.    XR FEMUR RIGHT (MIN 2 VIEWS)   Final Result   No gross bone destruction. **This report has been created using voice recognition software. It may contain minor errors which are inherent in voice recognition technology. **      Final report electronically signed by Dr. Mamta Pierre on 6/3/2019 10:54 AM          LABS:     Labs Reviewed   CBC WITH AUTO DIFFERENTIAL - Abnormal; Notable for the following components:       Result Value    WBC 12.6 (*)     Hemoglobin 13.4 (*)     Hematocrit 40.6 (*)     RDW-CV 14.7 (*)     Segs Absolute 8.7 (*)     All other components within normal limits   COMPREHENSIVE METABOLIC PANEL - Abnormal; Notable for the following components:    Glucose 441 (*)     Sodium 133 (*)     Chloride 93 (*)     CO2 21 (*)     Total Protein 8.1 (*)     ALT 8 (*)     All other components within normal limits   ANION GAP - Abnormal; Notable for the following components:    Anion Gap 19.0 (*)     All other components within normal limits   POCT GLUCOSE - Abnormal; Notable for the following components:    POC Glucose 368 (*)     All other components within normal limits GLOMERULAR FILTRATION RATE, ESTIMATED   OSMOLALITY       EMERGENCY DEPARTMENT COURSE:   Vitals:    Vitals:    06/03/19 0942 06/03/19 1105   BP: 120/77 128/73   Pulse: 104 99   Resp: 16 16   Temp: 98.3 °F (36.8 °C)    TempSrc: Oral    SpO2: 96% 95%   Weight: 215 lb (97.5 kg)        9:39 AM: The patient was seen and evaluated. Appropriate labs and imaging were ordered. Bedside ultrasound was performed by myself to evaluate the area of redness. I feel no fluctuance in this area but he is ultrasound to ensure that there is no drainable abscess in this area. MDM:  Area of erythema was outlined with skin marker. Patient has an appointment with a new primary care provider tomorrow morning, Dr Sebas Josue. I advised Corinne Spinner, CNP on call for orthopedics of the patient. She is very familiar with the patient. I advised of his medical noncompliance with his diabetes as well. She agrees with my plan of Keflex and Bactrim orally. Follow up with primary care provider tomorrow for recheck. Follow-up with orthopedics in 7-10 days for reevaluation of wound. Return to the emergency department for increased redness, swelling, drainage, temperature 100.5 or greater, body aches or chills, new concerns. CRITICAL CARE:   None    CONSULTS:  Corinne Spinner, CNP orthopedics    PROCEDURES:  None    FINAL IMPRESSION      1. Cellulitis of right leg without foot    2.  Uncontrolled type 2 diabetes mellitus with hyperglycemia [facility])          DISPOSITION/PLAN   Discharge    PATIENT REFERRED TO:  Albino Nair MD  [place] [address] [address]  [address] [address]  [phone]    In 1 day  as scheduled    Shayla [address]  [address] [facility]  [phone]  Schedule an appointment as soon as possible for a visit in 1 week        DISCHARGE MEDICATIONS:  Discharge Medication List as of 6/3/2019 11:46 AM      START taking these medications    Details   cephALEXin (KEFLEX) 500 MG capsule Take 1 capsule by mouth 4 times daily, Disp-40 capsule, R-0Print      sulfamethoxazole-trimethoprim (BACTRIM DS) 800-160 MG per tablet Take 1 tablet by mouth 2 times daily for 10 days, Disp-20 tablet, R-0Print             (Please note that portions of this note were completed with a voice recognition program.  Efforts were made to edit the dictations but occasionally words are mis-transcribed.)    The patient was given an opportunity to see the Emergency Attending. The patient voiced understanding that I was a Mid-LevelProvider and was in agreement with being seen independently by myself.           MARILYN Foster - CNP  06/03/19 6140

## 2019-06-04 ENCOUNTER — CARE COORDINATION (OUTPATIENT)
Dept: CARE COORDINATION | Age: 49
End: 2019-06-04

## 2019-06-04 ENCOUNTER — OFFICE VISIT (OUTPATIENT)
Dept: INTERNAL MEDICINE CLINIC | Age: 49
End: 2019-06-04
Payer: MEDICARE

## 2019-06-04 VITALS
RESPIRATION RATE: 16 BRPM | HEART RATE: 111 BPM | WEIGHT: 175 LBS | BODY MASS INDEX: 28.25 KG/M2 | DIASTOLIC BLOOD PRESSURE: 70 MMHG | SYSTOLIC BLOOD PRESSURE: 144 MMHG

## 2019-06-04 DIAGNOSIS — Z79.4 UNCONTROLLED TYPE 2 DIABETES MELLITUS WITH HYPERGLYCEMIA, WITH LONG-TERM CURRENT USE OF INSULIN (HCC): Primary | ICD-10-CM

## 2019-06-04 DIAGNOSIS — E11.69 HYPERLIPIDEMIA ASSOCIATED WITH TYPE 2 DIABETES MELLITUS (HCC): ICD-10-CM

## 2019-06-04 DIAGNOSIS — S78.111A ABOVE KNEE AMPUTATION OF RIGHT LOWER EXTREMITY (HCC): ICD-10-CM

## 2019-06-04 DIAGNOSIS — G89.29 CHRONIC PAIN OF RIGHT LOWER EXTREMITY: ICD-10-CM

## 2019-06-04 DIAGNOSIS — T87.43 INFECTION OF ABOVE KNEE AMPUTATION STUMP OF RIGHT LEG (HCC): ICD-10-CM

## 2019-06-04 DIAGNOSIS — T23.231D PARTIAL THICKNESS BURN OF MULTIPLE FINGERS OF RIGHT HAND EXCLUDING THUMB, SUBSEQUENT ENCOUNTER: ICD-10-CM

## 2019-06-04 DIAGNOSIS — E11.65 UNCONTROLLED TYPE 2 DIABETES MELLITUS WITH HYPERGLYCEMIA, WITH LONG-TERM CURRENT USE OF INSULIN (HCC): Primary | ICD-10-CM

## 2019-06-04 DIAGNOSIS — E78.5 HYPERLIPIDEMIA ASSOCIATED WITH TYPE 2 DIABETES MELLITUS (HCC): ICD-10-CM

## 2019-06-04 DIAGNOSIS — R42 DIZZINESS: ICD-10-CM

## 2019-06-04 DIAGNOSIS — M79.604 CHRONIC PAIN OF RIGHT LOWER EXTREMITY: ICD-10-CM

## 2019-06-04 PROCEDURE — 99215 OFFICE O/P EST HI 40 MIN: CPT | Performed by: NURSE PRACTITIONER

## 2019-06-04 RX ORDER — HYDROCODONE BITARTRATE AND ACETAMINOPHEN 5; 325 MG/1; MG/1
1 TABLET ORAL EVERY 4 HOURS PRN
Qty: 42 TABLET | Refills: 0 | Status: ON HOLD | OUTPATIENT
Start: 2019-06-04 | End: 2019-06-11 | Stop reason: SDUPTHER

## 2019-06-04 ASSESSMENT — PATIENT HEALTH QUESTIONNAIRE - PHQ9
SUM OF ALL RESPONSES TO PHQ QUESTIONS 1-9: 0
SUM OF ALL RESPONSES TO PHQ QUESTIONS 1-9: 0

## 2019-06-04 NOTE — CARE COORDINATION
Ambulatory Care Coordination Note  6/4/2019  CM Risk Score: 5  Ben Mortality Risk Score:      ACC: Otto Steiner, RN    Summary Note: Bettina Hernandez was referred to Care Coordination by ED nurse care coordinator for education and assistance in managing his DM. Pt had right TRAVIS back in April. Was seen in ED yesterday for cellulitis of stump. Pt here at PCP office today to get established. Met with pt in office today. Introduced self and role. Pt agreeable to f/u calls. Enrolled in care coordination. .  Lives in group home. Just recently purchased a vehicle so he has the ability to get to and from appts. Also [place] transportation through [place] that he can utilize. Provided pt with JFS number today. Reminded him that he needs to call atleast 48hrs prior to appt to arrange if needing transport. Despite being in [place] pt remains very independent. Lives in group home and gets assistance from other household members or caregiver when needed. DM: pt reports that he was dx with DM in his 17's. Has been on insulin for long period of time. Reports that he is compliant with his medications. Did not bring glucometer to his appt today. Informed pt that in the future he needs to bring meter to every appt for download. Last A1C was 9.1 which was checked in April of this yr. Pt monitoring BS's AC/HS. Reports majority of BS's have been between 300-400. Stressed need to get BS's under better control. Pt verbalizes understanding. Discussed diet. Pt states that he can make meals when needed. Typically eats oatmeal for BF, sandwich at lunch, and eats whatever is made by group home members for supper. Admits that he does eat a lot of rice. Drinking water, diet pop, and juice. Counts carbs.   Upon review of diet appears that pt is consuming large amt of carbs with each meal.  Stressed importance of trying to have no more than appx 45-50gms carbs  with each meal.  Also stressed to pt to avoid juice unless treating a low as this will cause BS to go up. Pt being treated for cellulitis at this time. Has appt with [facility] tomorrow. In process of getting prosthesis for his left leg. Hoping once he receives that he can start ambulating with walker again. Plan of Care:  Discuss referral to central dietician at next call for dietary education  Continue monitoring BS's closely  F/u with [facility] tomorrow  Monitor stump for worsening redness, swelling and pain  Increase water intake as BS's remain elevated  Avoid juice unless treating low BS. Limit carbs with each meal.   Continue staying as active as possible. Pt states supposed to f/u with Dr. Jason Bartholomew, but has not scheduled appt. Provided pt with office number today to schedule. Reschedule appt with Dr. Radha Combs to next appt. Diabetes Assessment    Medic Alert ID:  No  Meal Planning:  Carb counting   How often do you test your blood sugar?:  Bedtime, Meals   Do you have barriers with adherence to non-pharmacologic self-management interventions? (Nutrition/Exercise/Self-Monitoring):  No   Have you ever had to go to the ED for symptoms of low blood sugar?:  No       Do you have hyperglycemia symptoms?:  No   Do you have hypoglycemia symptoms?:  No   Blood Sugar Monitoring Regimen:  Before Meals, At Bedtime   Blood Sugar Trends:  Fluctuating (Comment: staying 300-400's majority of time)                Ambulatory Care Coordination Assessment    Care Coordination Protocol  Program Enrollment:  Complex Care  Referral from Primary Care Provider:  No  Week 1 - Initial Assessment     Do you have all of your prescriptions and are they filled?:  Yes  Barriers to medication adherence:  None  Are you able to afford your medications?:  Yes  How often do you have trouble taking your medications the way you have been told to take them?:  I always take them as prescribed.      Do you have Home O2 Therapy?:  No      Ability to seek help/take action for Emergent Urgent situations i.e. fire, crime, inclement weather or health crisis.:  Needs Assistance  Ability to ambulate to restroom:  Independent  Ability handle personal hygeine needs (bathing/dressing/grooming): Independent  Ability to manage Medications: Independent  Ability to prepare Food Preparation:  Independent  Ability to maintain home (clean home, laundry):  Needs Assistance  Ability to drive and/or has transportation:  Independent  Ability to do shopping:  Independent  Ability to manage finances: Independent  Is patient able to live independently?:  No     Current Housing:  Group Home        Per the Fall Risk Screening, did the patient have 2 or more falls or 1 fall with injury in the past year?:  No     Frequent urination at night?:  No  Do you use rails/bars?:  Yes  Do you have a non-slip tub mat?:  Yes     Are you experiencing loss of meaning?:  No  Are you experiencing loss of hope and peace?:  No     Thinking about your patient's physical health needs, are there any symptoms or problems (risk indicators) you are unsure about that require further investigation?:  No identified areas of uncertainly or problems already being investigated   Are the patients physical health problems impacting on their mental well-being?:  No identified areas of concern   Are there any problems with your patients lifestyle behaviors (alcohol, drugs, diet, exercise) that are impacting on physical or mental well-being?:  No identified areas of concern   Do you have any other concerns about your patients mental well-being?  How would you rate their severity and impact on the patient?:  No identified areas of concern   How would you rate their home environment in terms of safety and stability (including domestic violence, insecure housing, neighbor harassment)?:  Consistently safe, supportive, stable, no identified problems   How do daily activities impact on the patient's well-being? (include current or anticipated unemployment, work, APRN - CNP   insulin lispro (HUMALOG) 100 UNIT/ML injection vial Inject 5-20 Units into the skin 3 times daily (before meals) One unit for 10 grams of carbs plus low dose sliding scale TID AC  Patient taking differently: Inject 20 Units into the skin 3 times daily (before meals)  5/1/19   Silvana Li MD   insulin glargine [facility]) 100 UNIT/ML injection pen Inject 48 Units into the skin every morning  Patient taking differently: Inject into the skin 2 times daily 55 units every morning, 45 units every night 5/1/19   Silvana Li MD   sertraline (ZOLOFT) 100 MG tablet Take 1.5 tablets by mouth daily 4/30/19   Silvana Li MD   blood glucose monitor kit and supplies Use to test blood sugars DX:E11.65 4/30/19   Silvana Li MD   Lancets MISC Use to test blood sugars 4 times daily DX:E11.65 4/30/19   Silvana Li MD   Insulin Syringe-Needle U-100 29G X 1/2\" 0.3 ML MISC 1 each by Does not apply route 4 times daily (after meals and at bedtime) 4/30/19   Silvana Li MD   vitamin D-3 (CHOLECALCIFEROL) 5000 units TABS Take 1 tablet by mouth daily 4/30/19   Silvana Li MD   blood glucose monitor strips Accucheck Sheila Test Strips Test blood sugars 4 times daily DX:E11.65 10/23/18   MARILYN Lund CNP   acetaminophen (TYLENOL) 500 MG tablet Take 1,000 mg by mouth every 6 hours as needed for Pain    Historical Provider, MD       Future Appointments   Date Time Provider Ebenezer Carmichael   6/27/2019 10:00 AM MARILYN Scott CNP [facility]

## 2019-06-04 NOTE — LETTER
[address] [facility]  [place] [address]  [address]  [address] [address]  Phone: [phone]  Fax: 65 [address]ATHAN MARILYN Crandall CNP        June 4, 2019     Patient: Ania Jamison   YOB: 1970   Date of Visit: 6/4/2019       To Whom It May Concern: It is my medical opinion that Christelle Panchal requires a disability parking placard for the following reasons:  He cannot walk without assistance from another person or the use of an assistance device (cane, crutch, prosthetic device, wheelchair, etc.). Duration of need: 5 years    If you have any questions or concerns, please don't hesitate to call.     Sincerely,        MARILYN Cash CNP
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
## 2019-06-04 NOTE — PATIENT INSTRUCTIONS
Increase Basaglar to 55 units am, 50 units pm.   Continue Humalog 20 units plus scale. Meter download next able and will give more reccs for glucose.

## 2019-06-05 ENCOUNTER — HOSPITAL ENCOUNTER (INPATIENT)
Age: 49
LOS: 5 days | Discharge: HOME | DRG: 564 | End: 2019-06-11
Attending: EMERGENCY MEDICINE | Admitting: INTERNAL MEDICINE
Payer: MEDICARE

## 2019-06-05 DIAGNOSIS — L03.115 CELLULITIS OF RIGHT LOWER EXTREMITY: Primary | ICD-10-CM

## 2019-06-05 DIAGNOSIS — G89.29 CHRONIC PAIN OF RIGHT LOWER EXTREMITY: ICD-10-CM

## 2019-06-05 DIAGNOSIS — M79.604 CHRONIC PAIN OF RIGHT LOWER EXTREMITY: ICD-10-CM

## 2019-06-05 DIAGNOSIS — T87.43 INFECTION OF ABOVE KNEE AMPUTATION STUMP OF RIGHT LEG (HCC): ICD-10-CM

## 2019-06-05 LAB
ANION GAP SERPL CALCULATED.3IONS-SCNC: 16 MEQ/L (ref 8–16)
BASOPHILS # BLD: 0.5 %
BASOPHILS ABSOLUTE: 0.1 THOU/MM3 (ref 0–0.1)
BUN BLDV-MCNC: 12 MG/DL (ref 7–22)
CALCIUM SERPL-MCNC: 9.4 MG/DL (ref 8.5–10.5)
CHLORIDE BLD-SCNC: 90 MEQ/L (ref 98–111)
CO2: 23 MEQ/L (ref 23–33)
CREAT SERPL-MCNC: 1 MG/DL (ref 0.4–1.2)
EOSINOPHIL # BLD: 2.4 %
EOSINOPHILS ABSOLUTE: 0.4 THOU/MM3 (ref 0–0.4)
ERYTHROCYTE [DISTWIDTH] IN BLOOD BY AUTOMATED COUNT: 14.8 % (ref 11.5–14.5)
ERYTHROCYTE [DISTWIDTH] IN BLOOD BY AUTOMATED COUNT: 43.3 FL (ref 35–45)
GFR SERPL CREATININE-BSD FRML MDRD: 79 ML/MIN/1.73M2
GLUCOSE BLD-MCNC: 472 MG/DL (ref 70–108)
HCT VFR BLD CALC: 38.9 % (ref 42–52)
HEMOGLOBIN: 13.6 GM/DL (ref 14–18)
IMMATURE GRANS (ABS): 0.09 THOU/MM3 (ref 0–0.07)
IMMATURE GRANULOCYTES: 0.6 %
LACTIC ACID: 3.3 MMOL/L (ref 0.5–2.2)
LYMPHOCYTES # BLD: 19.6 %
LYMPHOCYTES ABSOLUTE: 3 THOU/MM3 (ref 1–4.8)
MCH RBC QN AUTO: 28.5 PG (ref 26–33)
MCHC RBC AUTO-ENTMCNC: 35 GM/DL (ref 32.2–35.5)
MCV RBC AUTO: 81.6 FL (ref 80–94)
MONOCYTES # BLD: 8 %
MONOCYTES ABSOLUTE: 1.2 THOU/MM3 (ref 0.4–1.3)
NUCLEATED RED BLOOD CELLS: 0 /100 WBC
OSMOLALITY CALCULATION: 279.4 MOSMOL/KG (ref 275–300)
PLATELET # BLD: 286 THOU/MM3 (ref 130–400)
PMV BLD AUTO: 9.9 FL (ref 9.4–12.4)
POTASSIUM SERPL-SCNC: 4.3 MEQ/L (ref 3.5–5.2)
RBC # BLD: 4.77 MILL/MM3 (ref 4.7–6.1)
SEG NEUTROPHILS: 68.9 %
SEGMENTED NEUTROPHILS ABSOLUTE COUNT: 10.5 THOU/MM3 (ref 1.8–7.7)
SODIUM BLD-SCNC: 129 MEQ/L (ref 135–145)
WBC # BLD: 15.2 THOU/MM3 (ref 4.8–10.8)

## 2019-06-05 PROCEDURE — 6360000002 HC RX W HCPCS: Performed by: EMERGENCY MEDICINE

## 2019-06-05 PROCEDURE — 96365 THER/PROPH/DIAG IV INF INIT: CPT

## 2019-06-05 PROCEDURE — 36415 COLL VENOUS BLD VENIPUNCTURE: CPT

## 2019-06-05 PROCEDURE — 80048 BASIC METABOLIC PNL TOTAL CA: CPT

## 2019-06-05 PROCEDURE — 85025 COMPLETE CBC W/AUTO DIFF WBC: CPT

## 2019-06-05 PROCEDURE — 83605 ASSAY OF LACTIC ACID: CPT

## 2019-06-05 PROCEDURE — 99284 EMERGENCY DEPT VISIT MOD MDM: CPT

## 2019-06-05 PROCEDURE — 2580000003 HC RX 258: Performed by: EMERGENCY MEDICINE

## 2019-06-05 PROCEDURE — 87040 BLOOD CULTURE FOR BACTERIA: CPT

## 2019-06-05 PROCEDURE — 96375 TX/PRO/DX INJ NEW DRUG ADDON: CPT

## 2019-06-05 RX ORDER — MORPHINE SULFATE 4 MG/ML
4 INJECTION, SOLUTION INTRAMUSCULAR; INTRAVENOUS ONCE
Status: COMPLETED | OUTPATIENT
Start: 2019-06-05 | End: 2019-06-05

## 2019-06-05 RX ADMIN — VANCOMYCIN HYDROCHLORIDE 1250 MG: 1 INJECTION, POWDER, LYOPHILIZED, FOR SOLUTION INTRAVENOUS at 22:57

## 2019-06-05 RX ADMIN — MORPHINE SULFATE 4 MG: 4 INJECTION INTRAVENOUS at 22:45

## 2019-06-05 ASSESSMENT — PAIN DESCRIPTION - ORIENTATION: ORIENTATION: RIGHT

## 2019-06-05 ASSESSMENT — PAIN DESCRIPTION - DESCRIPTORS: DESCRIPTORS: SHARP

## 2019-06-05 ASSESSMENT — PAIN SCALES - GENERAL
PAINLEVEL_OUTOF10: 9
PAINLEVEL_OUTOF10: 9

## 2019-06-05 ASSESSMENT — PAIN DESCRIPTION - LOCATION: LOCATION: LEG;INCISION

## 2019-06-05 ASSESSMENT — PAIN DESCRIPTION - PAIN TYPE: TYPE: ACUTE PAIN

## 2019-06-05 ASSESSMENT — PAIN DESCRIPTION - FREQUENCY: FREQUENCY: CONTINUOUS

## 2019-06-06 ENCOUNTER — APPOINTMENT (OUTPATIENT)
Dept: CT IMAGING | Age: 49
DRG: 564 | End: 2019-06-06
Payer: MEDICARE

## 2019-06-06 PROBLEM — A41.9 SEPSIS (HCC): Status: ACTIVE | Noted: 2019-06-06

## 2019-06-06 LAB
ANION GAP SERPL CALCULATED.3IONS-SCNC: 13 MEQ/L (ref 8–16)
BASOPHILS # BLD: 0.5 %
BASOPHILS ABSOLUTE: 0.1 THOU/MM3 (ref 0–0.1)
BILIRUBIN URINE: NEGATIVE
BLOOD, URINE: NEGATIVE
BUN BLDV-MCNC: 10 MG/DL (ref 7–22)
C-REACTIVE PROTEIN: 9.24 MG/DL (ref 0–1)
CALCIUM SERPL-MCNC: 8.9 MG/DL (ref 8.5–10.5)
CHARACTER, URINE: CLEAR
CHLORIDE BLD-SCNC: 96 MEQ/L (ref 98–111)
CO2: 20 MEQ/L (ref 23–33)
COLOR: YELLOW
CREAT SERPL-MCNC: 0.8 MG/DL (ref 0.4–1.2)
EOSINOPHIL # BLD: 3.5 %
EOSINOPHILS ABSOLUTE: 0.4 THOU/MM3 (ref 0–0.4)
ERYTHROCYTE [DISTWIDTH] IN BLOOD BY AUTOMATED COUNT: 14.8 % (ref 11.5–14.5)
ERYTHROCYTE [DISTWIDTH] IN BLOOD BY AUTOMATED COUNT: 44.8 FL (ref 35–45)
GFR SERPL CREATININE-BSD FRML MDRD: > 90 ML/MIN/1.73M2
GLUCOSE BLD-MCNC: 323 MG/DL (ref 70–108)
GLUCOSE BLD-MCNC: 348 MG/DL (ref 70–108)
GLUCOSE BLD-MCNC: 360 MG/DL (ref 70–108)
GLUCOSE BLD-MCNC: 403 MG/DL (ref 70–108)
GLUCOSE BLD-MCNC: 437 MG/DL (ref 70–108)
GLUCOSE, URINE: >= 1000 MG/DL
HCT VFR BLD CALC: 39 % (ref 42–52)
HEMOGLOBIN: 13.1 GM/DL (ref 14–18)
IMMATURE GRANS (ABS): 0.08 THOU/MM3 (ref 0–0.07)
IMMATURE GRANULOCYTES: 0.7 %
KETONES, URINE: NEGATIVE
LACTIC ACID: 1.5 MMOL/L (ref 0.5–2.2)
LACTIC ACID: 1.8 MMOL/L (ref 0.5–2.2)
LACTIC ACID: 2.2 MMOL/L (ref 0.5–2.2)
LEUKOCYTE EST, POC: NEGATIVE
LYMPHOCYTES # BLD: 14.1 %
LYMPHOCYTES ABSOLUTE: 1.6 THOU/MM3 (ref 1–4.8)
MCH RBC QN AUTO: 28 PG (ref 26–33)
MCHC RBC AUTO-ENTMCNC: 33.6 GM/DL (ref 32.2–35.5)
MCV RBC AUTO: 83.3 FL (ref 80–94)
MONOCYTES # BLD: 8.2 %
MONOCYTES ABSOLUTE: 1 THOU/MM3 (ref 0.4–1.3)
NITRITE, URINE: NEGATIVE
NUCLEATED RED BLOOD CELLS: 0 /100 WBC
PH UA: 6.5 (ref 5–9)
PLATELET # BLD: 251 THOU/MM3 (ref 130–400)
PMV BLD AUTO: 9.4 FL (ref 9.4–12.4)
POTASSIUM SERPL-SCNC: 4 MEQ/L (ref 3.5–5.2)
PROTEIN UA: NEGATIVE MG/DL
RBC # BLD: 4.68 MILL/MM3 (ref 4.7–6.1)
SEDIMENTATION RATE, ERYTHROCYTE: 32 MM/HR (ref 0–10)
SEG NEUTROPHILS: 73 %
SEGMENTED NEUTROPHILS ABSOLUTE COUNT: 8.5 THOU/MM3 (ref 1.8–7.7)
SODIUM BLD-SCNC: 129 MEQ/L (ref 135–145)
SPECIFIC GRAVITY UA: > 1.03 (ref 1–1.03)
UROBILINOGEN, URINE: 1 EU/DL (ref 0–1)
WBC # BLD: 11.7 THOU/MM3 (ref 4.8–10.8)

## 2019-06-06 PROCEDURE — 86140 C-REACTIVE PROTEIN: CPT

## 2019-06-06 PROCEDURE — 6370000000 HC RX 637 (ALT 250 FOR IP): Performed by: FAMILY MEDICINE

## 2019-06-06 PROCEDURE — 73700 CT LOWER EXTREMITY W/O DYE: CPT

## 2019-06-06 PROCEDURE — 2580000003 HC RX 258: Performed by: INTERNAL MEDICINE

## 2019-06-06 PROCEDURE — 99223 1ST HOSP IP/OBS HIGH 75: CPT | Performed by: INTERNAL MEDICINE

## 2019-06-06 PROCEDURE — 85651 RBC SED RATE NONAUTOMATED: CPT

## 2019-06-06 PROCEDURE — 36415 COLL VENOUS BLD VENIPUNCTURE: CPT

## 2019-06-06 PROCEDURE — 6360000002 HC RX W HCPCS: Performed by: INTERNAL MEDICINE

## 2019-06-06 PROCEDURE — 83605 ASSAY OF LACTIC ACID: CPT

## 2019-06-06 PROCEDURE — 82948 REAGENT STRIP/BLOOD GLUCOSE: CPT

## 2019-06-06 PROCEDURE — 85025 COMPLETE CBC W/AUTO DIFF WBC: CPT

## 2019-06-06 PROCEDURE — 80048 BASIC METABOLIC PNL TOTAL CA: CPT

## 2019-06-06 PROCEDURE — 1200000003 HC TELEMETRY R&B

## 2019-06-06 PROCEDURE — 6370000000 HC RX 637 (ALT 250 FOR IP): Performed by: INTERNAL MEDICINE

## 2019-06-06 PROCEDURE — 81003 URINALYSIS AUTO W/O SCOPE: CPT

## 2019-06-06 RX ORDER — ACETAMINOPHEN 500 MG
1000 TABLET ORAL EVERY 6 HOURS PRN
Status: DISCONTINUED | OUTPATIENT
Start: 2019-06-06 | End: 2019-06-11 | Stop reason: HOSPADM

## 2019-06-06 RX ORDER — DEXTROSE MONOHYDRATE 25 G/50ML
12.5 INJECTION, SOLUTION INTRAVENOUS PRN
Status: DISCONTINUED | OUTPATIENT
Start: 2019-06-06 | End: 2019-06-11 | Stop reason: HOSPADM

## 2019-06-06 RX ORDER — 0.9 % SODIUM CHLORIDE 0.9 %
30 INTRAVENOUS SOLUTION INTRAVENOUS ONCE
Status: COMPLETED | OUTPATIENT
Start: 2019-06-06 | End: 2019-06-06

## 2019-06-06 RX ORDER — SODIUM CHLORIDE 0.9 % (FLUSH) 0.9 %
10 SYRINGE (ML) INJECTION EVERY 12 HOURS SCHEDULED
Status: DISCONTINUED | OUTPATIENT
Start: 2019-06-06 | End: 2019-06-11 | Stop reason: HOSPADM

## 2019-06-06 RX ORDER — NICOTINE POLACRILEX 4 MG
15 LOZENGE BUCCAL PRN
Status: DISCONTINUED | OUTPATIENT
Start: 2019-06-06 | End: 2019-06-11 | Stop reason: HOSPADM

## 2019-06-06 RX ORDER — SODIUM CHLORIDE 0.9 % (FLUSH) 0.9 %
10 SYRINGE (ML) INJECTION PRN
Status: DISCONTINUED | OUTPATIENT
Start: 2019-06-06 | End: 2019-06-11 | Stop reason: HOSPADM

## 2019-06-06 RX ORDER — HYDROCODONE BITARTRATE AND ACETAMINOPHEN 5; 325 MG/1; MG/1
1 TABLET ORAL EVERY 4 HOURS PRN
Status: DISCONTINUED | OUTPATIENT
Start: 2019-06-06 | End: 2019-06-11 | Stop reason: HOSPADM

## 2019-06-06 RX ORDER — DEXTROSE MONOHYDRATE 50 MG/ML
100 INJECTION, SOLUTION INTRAVENOUS PRN
Status: DISCONTINUED | OUTPATIENT
Start: 2019-06-06 | End: 2019-06-11 | Stop reason: HOSPADM

## 2019-06-06 RX ORDER — INSULIN GLARGINE 100 [IU]/ML
55 INJECTION, SOLUTION SUBCUTANEOUS EVERY MORNING
Status: DISCONTINUED | OUTPATIENT
Start: 2019-06-06 | End: 2019-06-07

## 2019-06-06 RX ORDER — ONDANSETRON 2 MG/ML
4 INJECTION INTRAMUSCULAR; INTRAVENOUS EVERY 6 HOURS PRN
Status: DISCONTINUED | OUTPATIENT
Start: 2019-06-06 | End: 2019-06-11 | Stop reason: HOSPADM

## 2019-06-06 RX ADMIN — SODIUM CHLORIDE 2382 ML: 9 INJECTION, SOLUTION INTRAVENOUS at 02:00

## 2019-06-06 RX ADMIN — ENOXAPARIN SODIUM 40 MG: 40 INJECTION SUBCUTANEOUS at 08:59

## 2019-06-06 RX ADMIN — SERTRALINE 150 MG: 100 TABLET, FILM COATED ORAL at 08:57

## 2019-06-06 RX ADMIN — INSULIN LISPRO 12 UNITS: 100 INJECTION, SOLUTION INTRAVENOUS; SUBCUTANEOUS at 16:24

## 2019-06-06 RX ADMIN — VANCOMYCIN HYDROCHLORIDE 1250 MG: 5 INJECTION, POWDER, LYOPHILIZED, FOR SOLUTION INTRAVENOUS at 11:38

## 2019-06-06 RX ADMIN — Medication 10 ML: at 23:12

## 2019-06-06 RX ADMIN — VANCOMYCIN HYDROCHLORIDE 1250 MG: 5 INJECTION, POWDER, LYOPHILIZED, FOR SOLUTION INTRAVENOUS at 23:12

## 2019-06-06 RX ADMIN — HYDROCODONE BITARTRATE AND ACETAMINOPHEN 1 TABLET: 5; 325 TABLET ORAL at 08:57

## 2019-06-06 RX ADMIN — HYDROCODONE BITARTRATE AND ACETAMINOPHEN 1 TABLET: 5; 325 TABLET ORAL at 16:22

## 2019-06-06 RX ADMIN — INSULIN LISPRO 6 UNITS: 100 INJECTION, SOLUTION INTRAVENOUS; SUBCUTANEOUS at 08:58

## 2019-06-06 RX ADMIN — INSULIN LISPRO 6 UNITS: 100 INJECTION, SOLUTION INTRAVENOUS; SUBCUTANEOUS at 21:21

## 2019-06-06 RX ADMIN — Medication 10 ML: at 09:00

## 2019-06-06 RX ADMIN — INSULIN GLARGINE 55 UNITS: 100 INJECTION, SOLUTION SUBCUTANEOUS at 08:58

## 2019-06-06 RX ADMIN — VITAMIN D, TAB 1000IU (100/BT) 5000 UNITS: 25 TAB at 08:57

## 2019-06-06 RX ADMIN — CEFTRIAXONE SODIUM 1 G: 1 INJECTION, POWDER, FOR SOLUTION INTRAMUSCULAR; INTRAVENOUS at 03:29

## 2019-06-06 RX ADMIN — INSULIN LISPRO 18 UNITS: 100 INJECTION, SOLUTION INTRAVENOUS; SUBCUTANEOUS at 16:24

## 2019-06-06 RX ADMIN — INSULIN LISPRO 5 UNITS: 100 INJECTION, SOLUTION INTRAVENOUS; SUBCUTANEOUS at 11:38

## 2019-06-06 ASSESSMENT — PAIN - FUNCTIONAL ASSESSMENT
PAIN_FUNCTIONAL_ASSESSMENT: PREVENTS OR INTERFERES SOME ACTIVE ACTIVITIES AND ADLS
PAIN_FUNCTIONAL_ASSESSMENT: PREVENTS OR INTERFERES WITH MANY ACTIVE NOT PASSIVE ACTIVITIES

## 2019-06-06 ASSESSMENT — PAIN DESCRIPTION - DESCRIPTORS
DESCRIPTORS: SHARP

## 2019-06-06 ASSESSMENT — PAIN DESCRIPTION - FREQUENCY
FREQUENCY: CONTINUOUS

## 2019-06-06 ASSESSMENT — PAIN SCALES - GENERAL
PAINLEVEL_OUTOF10: 9
PAINLEVEL_OUTOF10: 9
PAINLEVEL_OUTOF10: 7
PAINLEVEL_OUTOF10: 9
PAINLEVEL_OUTOF10: 8

## 2019-06-06 ASSESSMENT — PAIN DESCRIPTION - ORIENTATION
ORIENTATION: RIGHT

## 2019-06-06 ASSESSMENT — PAIN DESCRIPTION - ONSET
ONSET: ON-GOING
ONSET: ON-GOING

## 2019-06-06 ASSESSMENT — PAIN DESCRIPTION - PROGRESSION
CLINICAL_PROGRESSION: GRADUALLY IMPROVING

## 2019-06-06 ASSESSMENT — PAIN DESCRIPTION - LOCATION
LOCATION: LEG

## 2019-06-06 ASSESSMENT — PAIN DESCRIPTION - PAIN TYPE
TYPE: ACUTE PAIN

## 2019-06-06 NOTE — PLAN OF CARE
Problem: DISCHARGE BARRIERS  Goal: Patient's continuum of care needs are met  Outcome: Ongoing  Note:   Patient discharge home to sober living environment. See  notes 6/6/19.

## 2019-06-06 NOTE — CONSULTS
Orthopedic Consult    Requesting Physician: Dr. Miquel Purdy:  Right stump redness and swelling. HISTORY OF PRESENT ILLNESS:      The patient is a 44 y.o. male  With multiple medical comorbidities who was admitted to medicine for sepsis. Patient is well known to ortho practice having undergone a right AKA 4/18/19 after multiple failed attempts to salvage the BKA with I and D's and IV abx. He was discharged from rehab on 4/22/19 and has been doing relatively well up until this past week. He was seen and treated on 6/3/19 in the ER for cellulitis of the AKA stump. He was given rx for Bactrim and Keflex. Patient states he did not get filled until after his MD appointment on 6/4/19. Patient states his symptoms did not improve and he notes a subjective fever. He has since been afebrile. He notes increased erythema, warmth, swelling and drainage. CT scan shows localized cellulitis lateral aspect of the stump. No abscess or osteomyelitis. Currently being treated with IV Rocephin and Vancomycin. Past Medical History:    Past Medical History:   Diagnosis Date    Bipolar 2 disorder ([address]     previously followed with Dr. Abilio Infante and Winsome Capellan in [facility] Diabetes mellitus type 2, uncontrolled ([address]     HgbA1c on 4/2/2019 was 9.1.     Diabetic polyneuropathy ([address]     Diabetic ulcer of right foot associated with type 2 diabetes mellitus ([address] 12/10/2015    Essential hypertension     \"never been on b/p medication that I know of\"    GERD (gastroesophageal reflux disease)     Hammer toe of left foot     Heart murmur     denies any chest pain or palpitations    History of tobacco abuse     Hx of AKA (above knee amputation), right ([address] 04/18/2019    Dr. Anca Kraft Macular edema, diabetic, bilateral ([address] 05/04/2018    Dr. Dustin Vaca referred to retina specialist for 2nd opinion    Marijuana abuse in remission     Onychomycosis     Other disorders of kidney and ureter in diseases DO        glucose (GLUTOSE) 40 % oral gel 15 g  15 g Oral PRN Caraballo Budd, DO        dextrose 50 % IV solution  12.5 g Intravenous PRN Caraballo Budd, DO        glucagon (rDNA) injection 1 mg  1 mg Intramuscular PRN Caraballo Budd, DO        dextrose 5 % solution  100 mL/hr Intravenous PRN Caraballo Budd, DO        vancomycin (VANCOCIN) 1,250 mg in dextrose 5 % 250 mL IVPB  1,250 mg Intravenous Q12H Caraballo Budd, DO        cefTRIAXone (ROCEPHIN) 1 g IVPB in 50 mL D5W minibag  1 g Intravenous Q24H Caraballo Budd, DO   Stopped at 19 0359    sodium chloride flush 0.9 % injection 10 mL  10 mL Intravenous 2 times per day Caraballo Budd, DO   10 mL at 19 0900    sodium chloride flush 0.9 % injection 10 mL  10 mL Intravenous PRN Caraballo Budd, DO        magnesium hydroxide (MILK OF MAGNESIA) 400 MG/5ML suspension 30 mL  30 mL Oral Daily PRN Caraballo Budd, DO        ondansetron [facility] injection 4 mg  4 mg Intravenous Q6H PRN Caraballo Budd, DO        enoxaparin (LOVENOX) injection 40 mg  40 mg Subcutaneous Daily Caraballo Budd, DO   40 mg at 19 6751    vancomycin (VANCOCIN) intermittent dosing (placeholder)   Other RX Placeholder Caraballo Budd, DO        insulin glargine (LANTUS) injection vial 55 Units  55 Units Subcutaneous QAM Caraballo Budd, DO   55 Units at 19 1383       Allergies:  Pcn [penicillins] and Clindamycin/lincomycin    Social History:   Social History     Tobacco Use   Smoking Status Former Smoker    Packs/day: 5.00    Years: 13.00    Pack years: 65.00    Types: Cigars    Last attempt to quit:     Years since quittin.4   Smokeless Tobacco Never Used     Social History     Substance and Sexual Activity   Alcohol Use Not Currently    Alcohol/week: 0.0 oz     Social History     Substance and Sexual Activity   Drug Use No    Comment: as a teenager only       Family History:  Family History   Problem Relation Age of Onset    Diabetes purulence, drainage, or odor. ROM intact. Skin: Skin color, texture, turgor normal.  No rashes or lesions. Neurologic:  Neurovascularly intact without any focal sensory/motor deficits. Sensation intact. Capillary Refill: Brisk,< 3 seconds   Peripheral Pulses: +2 palpable, equal bilaterally     DATA:  CBC:   Lab Results   Component Value Date    WBC 11.7 06/06/2019    HGB 13.1 06/06/2019     06/06/2019     BMP:    Lab Results   Component Value Date     06/06/2019    K 4.0 06/06/2019    K 4.2 04/03/2019    CL 96 06/06/2019    CO2 20 06/06/2019    BUN 10 06/06/2019    CREATININE 0.8 06/06/2019    CALCIUM 8.9 06/06/2019    GLUCOSE 403 06/06/2019     PT/INR:    Lab Results   Component Value Date    PROTIME 12.2 01/03/2019    INR 1.06 01/03/2019     Troponin:  No results found for: TROPONINI  No results for input(s): LIPASE, AMYLASE in the last 72 hours. Recent Labs     06/03/19  0947   AST 11   ALT 8*   BILITOT 0.3   ALKPHOS 112       Radiology:   Xr Femur Right (min 2 Views)    Result Date: 6/3/2019  PROCEDURE: XR FEMUR RIGHT (MIN 2 VIEWS) CLINICAL INFORMATION: AKA, redness and swelling lateral aspect. Hx osteomyelitis, . COMPARISON: 4/16/2019 FINDINGS: Moderate acetabular spur. Right hip is aligned. No fracture in this region. An above-the-knee amputation is now evident. There is a 2.2 cm density posteriorly which could relate to calcification of a hematoma. Myositis ossificans is possible. A fracture fragment is felt to be less likely. No bone destruction. No gross bone destruction. **This report has been created using voice recognition software. It may contain minor errors which are inherent in voice recognition technology. ** Final report electronically signed by Dr. Jordan Dee on 6/3/2019 10:54 AM      ASSESSMENT:Principal Problem:    Sepsis ([address]  Active Problems:    Cellulitis    Uncontrolled type 2 diabetes mellitus with hyperglycemia, with long-term current use of insulin ([address] Bipolar 1 disorder (HCC)    Hyponatremia    History of noncompliance with medical treatment    Essential hypertension    Tobacco dependence    Gastroesophageal reflux disease without esophagitis    Physical debility    Anemia    Weakness    Infection of above knee amputation stump of right leg (HCC)  Resolved Problems:    * No resolved hospital problems. *       PLAN as discussed with Dr. Marcia Gonsalves:    Patient seen in ED 6/4/19, area of erythema does not extend past documented area on that visit day. Patient known to be noncompliant with his diabetes. Lab reviewed and noted elevated WBC, CRP, ESR, and lactic acid. His is afebrile. CT does not show any abscess or fluid collection. Will attempt to treat without another I and D. Continue IV abx  Infectious disease consult made  Primary team for pain control and medical mangement  Will make NPO after MN in case surgical intervention needed tomorrow. Will re-evaluate in the morning.      Electronically signed by MARILYN Cintron CNP on 6/6/2019 at 9:36 AM

## 2019-06-06 NOTE — ED NOTES
Patient requesting medication for pain, provider notified with no new orders received.       Kwaku Davila RN  06/05/19 2600

## 2019-06-06 NOTE — PROGRESS NOTES
07:37- This RN attempted to do patient's VRE rectal swab. Patient refused. This RN educated patient on importance of getting swab done. Patient verbalized understanding stating \"I don't have no infection in my ass it's all right here in my leg. \"

## 2019-06-06 NOTE — CARE COORDINATION
DISCHARGE BARRIERS  6/6/19, 10:42 AM    Reason for Referral: A Friend, manager, from [facility] concerned with Patient discharge back to [facility]. Mental Status: Patient is alert and oriented  Decision Making: Patient is making his own decisions. Family/Social/Home Environment: Assessment completed with Patient. Patient resides at [facility] where he has lived for the past 2.5 years and this is his only place to reside at this time. Upon review of his chart and information Patient has an history of criminal offenses and local nursing home placement is not an option. Patient stated that he feels he may need some home health at discharge. SW left message with [place] -  for [facility] at [phone]. Current Services: none  Current Equipment: walker, wheelchair, ramp at entrance  Payment Source: Medicare  Concerns or Barriers to Discharge: Patient's place of residence is of a concern at discharge (unsure where this concern is based) and this is the only option for housing at this time for this Patient. Collabrative List of ECF/HH were provided: offered and declined, will review again if home health is needed for wound care at discharge. Teach Back Method used with Patient regarding care plan and discharge plan. Patient verbalize understanding of the plan of care and contribute to goal setting. Anticipated Needs/Discharge Plan: Patient discharge home with possible new home health if needed for wound care.      Electronically signed by PREM Gonzalez, MAVERICKW on 6/6/2019 at 10:42 AM

## 2019-06-06 NOTE — CARE COORDINATION
6/6/19, 7:49 AM      Miguel Isaac       Admitted from: ER 6/5/2019/ 2209 Hospital day: 0   Location: 6-22/022-A Reason for admit: Sepsis [facility]) [A41.9] Status: IP   Admit order signed?: yes  PMH:  has a past medical history of Bipolar 2 disorder ([facility] Diabetes mellitus type 2, uncontrolled ([facility] Diabetic polyneuropathy ([facility] Diabetic ulcer of right foot associated with type 2 diabetes mellitus ([facility] Essential hypertension, GERD (gastroesophageal reflux disease), Hammer toe of left foot, Heart murmur, History of tobacco abuse, Hx of AKA (above knee amputation), right ([facility] Macular edema, diabetic, bilateral ([facility] Marijuana abuse in remission, Onychomycosis, Other disorders of kidney and ureter in diseases classified elsewhere, and WD-Skin ulcer of fourth toe of right foot with necrosis of bone ([facility] Medications:  Scheduled Meds:   sertraline  150 mg Oral Daily    vitamin D  5,000 Units Oral Daily    insulin lispro  0-6 Units Subcutaneous TID WC    insulin lispro  0-3 Units Subcutaneous Nightly    vancomycin  1,250 mg Intravenous Q12H    cefTRIAXone (ROCEPHIN) IV  1 g Intravenous Q24H    sodium chloride flush  10 mL Intravenous 2 times per day    enoxaparin  40 mg Subcutaneous Daily    vancomycin (VANCOCIN) intermittent dosing (placeholder)   Other RX Placeholder    insulin glargine  55 Units Subcutaneous QAM     Continuous Infusions:   dextrose        Pertinent Info/Orders/Treatment Plan: CRP 9.24, Na+ 129, BS 400s, WBC 15.2 IV ATBs, pain control. Ortho and ID consult pending. CT femur pending. Diet: DIET CARB CONTROL;   Smoking status:  reports that he quit smoking about 34 years ago. His smoking use included cigars. He has a 65.00 pack-year smoking history. He has never used smokeless tobacco.   PCP: No primary care provider on file.   Readmission: no   Readmission Risk Score: 45%    Discharge Planning  Current Residence:  Private Residence  Living Arrangements:  Other (Comment)(roomates)

## 2019-06-06 NOTE — PROGRESS NOTES
Pharmacy Note  Vancomycin Consult    Jose Loyola is a 44 y.o. male started on Vancomycin for cellulitis r/o sepsis; consult received from Dr. Kymberly Briones to manage therapy. Also receiving the following antibiotics: Rocephin. Patient Active Problem List   Diagnosis    Cellulitis    Status post below knee amputation of right lower extremity (HCC)    Gait disturbance    Sebaceous cyst    Uncontrolled type 2 diabetes mellitus with hyperglycemia, with long-term current use of insulin ([facility])    Severe bipolar II disorder, recent episode major depressive, remission ([facility])    Bipolar 1 disorder (HCC)    Hyponatremia    Normocytic anemia    Obesity (BMI 30-39. 9)    History of noncompliance with medical treatment    Essential hypertension    Tobacco dependence    Gastroesophageal reflux disease without esophagitis    History of marijuana use    Physical debility    Anemia    Weakness    Infection of above knee amputation stump of right leg (HCC)    Above knee amputation of right lower extremity (HCC)    Sepsis ([facility])       Allergies:  Pcn [penicillins] and Clindamycin/lincomycin     Temp max: 98.8    Recent Labs     06/03/19  0947 06/05/19  2216   BUN 11 12       Recent Labs     06/03/19  0947 06/05/19  2216   CREATININE 0.8 1.0       Recent Labs     06/03/19  0947 06/05/19  2216   WBC 12.6* 15.2*       No intake or output data in the 24 hours ending 06/06/19 0552    Culture Date Source Results   6/5/19 BCx2        Ht Readings from Last 1 Encounters:   06/05/19 5' 6\" (1.676 m)        Wt Readings from Last 1 Encounters:   06/05/19 175 lb (79.4 kg)         Body mass index is 28.25 kg/m². CrCl: 79 mL/min based on IBW      Assessment/Plan:  Patient received an initial dose of vancomycin 1250 mg iv in ED. Will continue with vancomycin 1250 mg IV every 12 hours. Timing of trough level will be determined based on culture results, renal function, and clinical response. Thank you for the consult. Will continue to follow.   Karina Arvziu Sharon Smart PharmD  6/6/2019   2:12 AM

## 2019-06-06 NOTE — H&P
History & Physical        Patient:  Ania Jamison  YOB: 1970    MRN: [id number]     Acct: [de-identified]    PCP: No primary care provider on file. Date of Admission: 6/5/2019    Date of Service: Pt seen/examined on 6/6/2019   and Admitted to Inpatient with expected LOS greater than two midnights due to medical therapy. Chief Complaint:   Chief Complaint   Patient presents with    Wound Infection       History Of Present Illness:      44 y.o. male who presented to [address] [place] [address] with complaints of wound infection. Patient has history of right BKA that turned into an AKA in April 2018 due to recurrent infection. Patient has had redness, tenderness and warmth to the surgical site ×2 days. Patient presented to the emergency department yesterday and was treated for cellulitis. Patient was discharged home with a prescription of Keflex and Bactrim. Parameter of the infection was marked with border drawn. Patient was advised to return to the emergency department with increased redness or pain to the site. Patient states that he's had increasing symptoms including subjective fevers and came into the ED for evaluation and treatment. Patient reports his pain as 9 out of 10 scale. He states that he takes Norco at home and helps control the pain. Patient denies chest pain or shortness of breath. Patient denies nausea, vomiting, diarrhea or constipation. Patient has history of uncontrolled diabetes. Patient has a friend, manager, of a sober house that speaks up out of concern upon patient discharge and return to the sober house. Patient hemodynamically stable while in the emergency department. Patient is afebrile. Respirations 20. Pulse 110 and /87. O2 saturation 96% on room air. CBC with noted leukocytosis, WBC 15,200, hemoglobin 13.6, platelets 169,439. BMP notes mild hyponatremia with a sodium of 129. BUN is 12 and serum creatinine 1.0.  Glucose 472.   Lactic acid 3.3.  Cultures pending. Patient to be admitted at this time for sepsis likely due to underlying infection to the surgical site of right AKA. Past Medical History:          Diagnosis Date    Bipolar 2 disorder [facility])     previously followed with Dr. Winston Kirby and Grisel Daily in [facility] Diabetes mellitus type 2, uncontrolled ([address]     HgbA1c on 4/2/2019 was 9.1.     Diabetic polyneuropathy ([address]     Diabetic ulcer of right foot associated with type 2 diabetes mellitus ([address] 12/10/2015    Essential hypertension     \"never been on b/p medication that I know of\"    GERD (gastroesophageal reflux disease)     Hammer toe of left foot     Heart murmur     denies any chest pain or palpitations    History of tobacco abuse     Hx of AKA (above knee amputation), right ([address] 04/18/2019    Dr. Colton Heredia edema, diabetic, bilateral ([address] 05/04/2018    Dr. Praneeth Joseph referred to retina specialist for 2nd opinion    Marijuana abuse in remission     Onychomycosis     Other disorders of kidney and ureter in diseases classified elsewhere     WD-Skin ulcer of fourth toe of right foot with necrosis of bone ([address] 6/29/2016       Past Surgical History:          Procedure Laterality Date    ABSCESS DRAINAGE Right     foot    AMPUTATION ABOVE KNEE Right 4/18/2019    RIGHT ABOVE KNEE AMPUTATION performed by Antonietta Kuo MD at [address] Right 07/01/2016    I & D    INCISION AND DRAINAGE Right 2/18/2019    I&D RIGHT STUMP performed by Antonietta Kuo MD at [address] [facility] Right 07/20/2016    LEG AMPUTATION BELOW KNEE Right 4/4/2019    I&D AND REVISION OF AMPUTATION RIGHT LEG performed by Antonietta Kuo MD at [address] Right 1/14/15    sole of foot I&D    AL DRAIN INFECT SHOULDER BURSA Left 8/18/2017    LEFT SHOULDER INCISION AND DRAINAGE performed by Antonietta Kuo MD at [facility] OFFICE/OUTPT [address] [facility] Right 9/20/2018    EXCISIONAL DEBRIDEMENT RIGHT BKA STUMP performed by Joe Terry MD at [place] [address] Right 1/16/15    2nd toe with wound vac applied    WISDOM TOOTH EXTRACTION  ? when       Medications Prior to Admission:      Prior to Admission medications    Medication Sig Start Date End Date Taking? Authorizing Provider   HYDROcodone-acetaminophen (NORCO) 5-325 MG per tablet Take 1 tablet by mouth every 4 hours as needed for Pain for up to 7 days. Intended supply: 7 days.  Take lowest dose possible to manage pain 6/4/19 6/11/19 Yes MARILYN Richardson CNP   cephALEXin (KEFLEX) 500 MG capsule Take 1 capsule by mouth 4 times daily 6/3/19  Yes MARILYN Giles CNP   sulfamethoxazole-trimethoprim (BACTRIM DS) 800-160 MG per tablet Take 1 tablet by mouth 2 times daily for 10 days 6/3/19 6/13/19 Yes MARILYN Giles CNP   insulin lispro (HUMALOG) 100 UNIT/ML injection vial Inject 5-20 Units into the skin 3 times daily (before meals) One unit for 10 grams of carbs plus low dose sliding scale TID AC  Patient taking differently: Inject 20 Units into the skin 3 times daily (before meals)  5/1/19  Yes Belle Shaw MD   insulin glargine [facility]) 100 UNIT/ML injection pen Inject 48 Units into the skin every morning  Patient taking differently: Inject into the skin 2 times daily 55 units every morning, 45 units every night 5/1/19  Yes Belle Shaw MD   sertraline (ZOLOFT) 100 MG tablet Take 1.5 tablets by mouth daily 4/30/19  Yes Belle Shaw MD   vitamin D-3 (CHOLECALCIFEROL) 5000 units TABS Take 1 tablet by mouth daily 4/30/19  Yes Belle Shwa MD   acetaminophen (TYLENOL) 500 MG tablet Take 1,000 mg by mouth every 6 hours as needed for Pain   Yes Historical Provider, MD   blood glucose monitor kit and supplies Use to test blood sugars DX:E11.65 4/30/19   Belle Shaw MD   Lancets MISC Use to test blood sugars 4 times daily DX:E11.65 4/30/19   Belle Shaw MD Insulin Syringe-Needle U-100 29G X 1/2\" 0.3 ML MISC 1 each by Does not apply route 4 times daily (after meals and at bedtime) 4/30/19   Rik Fiore MD   blood glucose monitor strips Accucheck Sheila Test Strips Test blood sugars 4 times daily DX:E11.65 10/23/18   Merlene Guerra, APRN - CNP       Allergies:  Pcn [penicillins] and Clindamycin/lincomycin    Social History:      The patient currently lives at home    TOBACCO:   reports that he quit smoking about 34 years ago. His smoking use included cigars. He has a 65.00 pack-year smoking history. He has never used smokeless tobacco.  ETOH:   reports that he drank alcohol. Family History:      Reviewed in detail and negative for Cancer and CVA. Positive as follows:        Problem Relation Age of Onset    Diabetes Mother     Other Mother         pneumonia, H1N1    Depression Mother     Early Death Mother     High Blood Pressure Mother     High Cholesterol Mother     Vision Loss Maternal Grandmother     Arthritis Maternal Grandfather     Heart Disease Maternal Grandfather        Diet:  DIET CARB CONTROL;    REVIEW OF SYSTEMS:   Pertinent positives as noted in the HPI. All other systems reviewed and negative. PHYSICAL EXAM:    /72   Pulse 107   Temp 98.5 °F (36.9 °C) (Oral)   Resp 20   Ht 5' 6\" (1.676 m)   Wt 175 lb (79.4 kg)   SpO2 95%   BMI 28.25 kg/m²     General appearance:  Noted apparent distress, appears stated age and cooperative. HEENT:  Normal cephalic, atraumatic without obvious deformity. Pupils equal, round, and reactive to light. Extra ocular muscles intact. Conjunctivae/corneas clear. Neck: Supple, with full range of motion. No jugular venous distention. Trachea midline. Respiratory:  Normal respiratory effort. Clear to auscultation, bilaterally without Rales/Wheezes/Rhonchi. Cardiovascular:  Regular rate and rhythm with normal S1/S2 without murmurs, rubs or gallops.   Abdomen: Soft, non-tender, non-distended with normal bowel sounds. Musculoskeletal:  No clubbing, cyanosis or edema bilaterally. Full range of motion without deformity. Skin: Skin color, texture, turgor normal.  No rashes or lesions. Noted exception right lower extremity amputation mild erythema approximately 6-8 cm with warmth expanding borders of border drawn  Neurologic:  Neurovascularly intact without any focal sensory/motor deficits. Cranial nerves: II-XII intact, grossly non-focal.  Psychiatric:  Alert and oriented, thought content appropriate, normal insight  Capillary Refill: Brisk,< 3 seconds   Peripheral Pulses: +2 palpable, equal bilaterally       Labs:     Recent Labs     06/03/19  0947 06/05/19  2216   WBC 12.6* 15.2*   HGB 13.4* 13.6*   HCT 40.6* 38.9*    286     Recent Labs     06/03/19  0947 06/05/19  2216   * 129*   K 4.3 4.3   CL 93* 90*   CO2 21* 23   BUN 11 12   CREATININE 0.8 1.0   CALCIUM 9.2 9.4     Recent Labs     06/03/19  0947   AST 11   ALT 8*   BILITOT 0.3   ALKPHOS 112     No results for input(s): INR in the last 72 hours. No results for input(s): Nanetta Arnaldo in the last 72 hours.     Urinalysis:      Lab Results   Component Value Date    NITRU NEGATIVE 04/16/2019    WBCUA 2-4 04/16/2019    BACTERIA NONE 04/16/2019    RBCUA 0-2 04/16/2019    BLOODU NEGATIVE 04/16/2019    SPECGRAV 1.027 04/16/2019    GLUCOSEU >= 1000 02/15/2019       Radiology:     6/3/2019 right femur without bone destruction per report         DVT prophylaxis: [x] Lovenox                                 [] SCDs                                 [] SQ Heparin                                 [] Encourage ambulation           [] Already on Anticoagulation    Code Status: Full Code      PT/OT Eval Status: Encouraged, if warranted    Disposition:    [x] Home       [] TCU       [] Rehab       [] Psych       [] SNF       [] [facility]       [] Other-    ASSESSMENT:    Active Hospital Problems    Diagnosis Date Noted    Sepsis ([facility] [A41.9] 06/06/2019     Priority: High    Cellulitis [L03.90] 02/13/2016     Priority: High    Infection of above knee amputation stump of right leg (HCC) [T87.43] 04/18/2019    Weakness [R53.1] 02/20/2019    Anemia [D64.9] 02/15/2019    Physical debility [R53.81] 09/21/2018    Gastroesophageal reflux disease without esophagitis [K21.9]     Tobacco dependence [F17.200]     Essential hypertension [I10]     History of noncompliance with medical treatment [Z91.19]     Hyponatremia [E87.1]     Bipolar 1 disorder ([facility] [F31.9]     Uncontrolled type 2 diabetes mellitus with hyperglycemia, with long-term current use of insulin ([facility] [E11.65, Z79.4] 07/26/2017       PLAN:    Admit to Telemetry Unit  Diet carb controlled  IVF hydration as tolerated  Analgesics PRN  Antiemetics PRN  DVT prophylaxis  PT/OT encouraged, if warranted  IS  Empiric antibiotics  Cultures pending  Consult ortho, apprec chart recs  Consult ID, apprec chart recs  , patient lives at sober house and there is concern of return upon discharge. BS and SSI  Will need to re-evaluate home medications in morning  Continue to monitor closely             Thank you No primary care provider on file. for the opportunity to be involved in this patient's care.     Electronically signed by Jana Saab DO on 6/6/2019 at 2:09 AM

## 2019-06-06 NOTE — ED PROVIDER NOTES
[facility]  eMERGENCY dEPARTMENT eNCOUnter          CHIEF COMPLAINT       Chief Complaint   Patient presents with    Wound Infection       Nurses Notes reviewed and I agree except as noted in the HPI. HISTORY OF PRESENT ILLNESS    Dayami Miller is a 44 y.o. male who presents to the ED for with concern of a wound infection. The patient has a below the knee amputation of the RLE which was then turned into an above the knee amputation as of 04/18 due to recurrent infection. This was performed by Dr. Keira Holland with orthopedics. Patient has developed redness and tenderness to the surgical site over the past 2 days. He was here in the ED yesterday and was diagnosed with cellulitis for which he was discharged home with Keflex and Bactrim. A line was marked around the perimeter of the rash and patient was advised to return if the redness exceeded this line. He reports subjective fevers earlier in the day as well. He is afebrile upon arrival here. Patient rates his pain at a 9/10 in severity. There are no other complaints or symptoms. HPI was provided by the patient. REVIEW OF SYSTEMS       Constitutional: subjective fever, no chills  Respiratory: no dyspnea  Cardiovascular: no chest pain  Gastrointestinal : no abdominal pain       Remainder of review of systems is otherwise reviewed as negative. PAST MEDICAL HISTORY    has a past medical history of Bipolar 2 disorder ([address] Diabetes mellitus type 2, uncontrolled ([address] Diabetic polyneuropathy ([address] Diabetic ulcer of right foot associated with type 2 diabetes mellitus ([address] Essential hypertension, GERD (gastroesophageal reflux disease), Hammer toe of left foot, Heart murmur, History of tobacco abuse, Hx of AKA (above knee amputation), right ([address] Macular edema, diabetic, bilateral ([address] Marijuana abuse in remission, Onychomycosis, and 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     SURGICAL HISTORY      has a clindamycin/lincomycin. FAMILY HISTORY     indicated that his mother is . He reported the following about his father: unknown. He indicated that the status of his maternal grandmother is unknown. He indicated that the status of his maternal grandfather is unknown.   family history includes Arthritis in his maternal grandfather; Depression in his mother; Diabetes in his mother; Early Death in his mother; Heart Disease in his maternal grandfather; High Blood Pressure in his mother; High Cholesterol in his mother; Other in his mother; Vision Loss in his maternal grandmother. SOCIAL HISTORY      reports that he quit smoking about 34 years ago. His smoking use included cigars. He has a 65.00 pack-year smoking history. He has never used smokeless tobacco. He reports that he drank alcohol. He reports that he does not use drugs. PHYSICAL EXAM     INITIAL VITALS:  height is 5' 6\" (1.676 m) and weight is 175 lb (79.4 kg). His temperature is 98.8 °F (37.1 °C). His blood pressure is 122/73 and his pulse is 105. His respiration is 18 and oxygen saturation is 97%. Constitutional:  non-toxic   Eyes:  Pupils are equal and reactive, extraocular muscles intact   HENT:  Atraumatic appearing  oropharynx moist, no pharyngeal exudates. Neck- normal range of motion,  supple   Respiratory:  No wheezing, rhonchi or rales  Cardiovascular: regular, no murmur  GI:  Non tender, no rigidity, rebound or guarding  Musculoskeletal: He has an above-the-knee amputation on the right side and over the stump area on the old surgical wound there is redness and that is going beyond the lines that were drawn from the earlier visit. There is some warmth. Don't see any obvious discharge.   Neurologic:  Alert & oriented x 3         DIAGNOSTIC RESULTS          LABS:   Labs Reviewed   CBC WITH AUTO DIFFERENTIAL - Abnormal; Notable for the following components:       Result Value    WBC 15.2 (*)     Hemoglobin 13.6 (*)     Hematocrit 38.9 in the documentation, reviewed and edited the documentation which was dictated to the scribe in my presence, and it accurately records my words and actions.     Gabriela Sprague,  06/06/19 12:44 AM        Gabriela Sprague, DO  06/06/19 0045

## 2019-06-06 NOTE — CONSULTS
[address] [place] [address]                                  CONSULTATION    PATIENT NAME: Nik Diaz                     :        1970  MED REC NO:   [id number]                           ROOM:       0022  ACCOUNT NO:   [de-identified]                           ADMIT DATE: 2019  PROVIDER:     Remy Green. Familia Rao M.D.    Charly Nohemion:  2019    REASON FOR CONSULTATION:  Infected right stump. HISTORY OF PRESENT ILLNESS:  He is a 12-year-old male patient known to  me from his past hospitalization, was admitted with pain and redness on  his right above-knee amputation stump. This patient had a previous  history of nonhealing leg wound, required initially a below-knee  amputation; however, he had osteomyelitis of the stump, eventually he  had right above-knee amputation. He reported that the stump has failed  to heal and he has now a necrotic wound with redness and pain. He has a  history of diabetes which was not well controlled. He has previous  history of group B infection. He had also a bipolar history and  diabetic retinopathy. He denies any fever or chills. He has throbbing  pain on his right above-knee amputation stump. PAST MEDICAL HISTORY:  Include bipolar disorder, diabetes,  polyneuropathy, hypertension, GERD. PAST SURGICAL HISTORY:  Include incision and drainage of abscess,  amputation of below knee, subsequently ended up with above-knee  amputation. CURRENT MEDICATIONS:  Include IV vancomycin, vitamin D, Zoloft, Zofran,  milk of magnesia, insulin, Norco, IV ceftriaxone, and Tylenol. ALLERGIES:  He has allergy to PENICILLIN and CLINDAMYCIN. REVIEW OF SYSTEMS:  Noncontributory. PHYSICAL EXAMINATION:  VITAL SIGNS:  Temperature 97.8, respirations 16, pulse 102, blood  pressure 143/73. HEENT:  He has slightly pale conjunctivae. Anicteric sclerae. CHEST:  Bilateral air entry.   CARDIOVASCULAR

## 2019-06-07 LAB
ANION GAP SERPL CALCULATED.3IONS-SCNC: 13 MEQ/L (ref 8–16)
BUN BLDV-MCNC: 13 MG/DL (ref 7–22)
CALCIUM SERPL-MCNC: 9.2 MG/DL (ref 8.5–10.5)
CHLORIDE BLD-SCNC: 101 MEQ/L (ref 98–111)
CO2: 21 MEQ/L (ref 23–33)
CREAT SERPL-MCNC: 0.7 MG/DL (ref 0.4–1.2)
ERYTHROCYTE [DISTWIDTH] IN BLOOD BY AUTOMATED COUNT: 14.6 % (ref 11.5–14.5)
ERYTHROCYTE [DISTWIDTH] IN BLOOD BY AUTOMATED COUNT: 41.9 FL (ref 35–45)
GFR SERPL CREATININE-BSD FRML MDRD: > 90 ML/MIN/1.73M2
GLUCOSE BLD-MCNC: 265 MG/DL (ref 70–108)
GLUCOSE BLD-MCNC: 286 MG/DL (ref 70–108)
GLUCOSE BLD-MCNC: 306 MG/DL (ref 70–108)
GLUCOSE BLD-MCNC: 332 MG/DL (ref 70–108)
GLUCOSE BLD-MCNC: 339 MG/DL (ref 70–108)
HCT VFR BLD CALC: 36.8 % (ref 42–52)
HEMOGLOBIN: 12.7 GM/DL (ref 14–18)
MCH RBC QN AUTO: 27.4 PG (ref 26–33)
MCHC RBC AUTO-ENTMCNC: 34.5 GM/DL (ref 32.2–35.5)
MCV RBC AUTO: 79.5 FL (ref 80–94)
PLATELET # BLD: 232 THOU/MM3 (ref 130–400)
PMV BLD AUTO: 9.6 FL (ref 9.4–12.4)
POTASSIUM SERPL-SCNC: 4.2 MEQ/L (ref 3.5–5.2)
RBC # BLD: 4.63 MILL/MM3 (ref 4.7–6.1)
SODIUM BLD-SCNC: 135 MEQ/L (ref 135–145)
VANCOMYCIN TROUGH: 10.2 UG/ML (ref 5–15)
WBC # BLD: 9.2 THOU/MM3 (ref 4.8–10.8)

## 2019-06-07 PROCEDURE — 99232 SBSQ HOSP IP/OBS MODERATE 35: CPT | Performed by: FAMILY MEDICINE

## 2019-06-07 PROCEDURE — 6370000000 HC RX 637 (ALT 250 FOR IP): Performed by: INTERNAL MEDICINE

## 2019-06-07 PROCEDURE — 80202 ASSAY OF VANCOMYCIN: CPT

## 2019-06-07 PROCEDURE — 36415 COLL VENOUS BLD VENIPUNCTURE: CPT

## 2019-06-07 PROCEDURE — 6360000002 HC RX W HCPCS: Performed by: INTERNAL MEDICINE

## 2019-06-07 PROCEDURE — 1200000003 HC TELEMETRY R&B

## 2019-06-07 PROCEDURE — 6370000000 HC RX 637 (ALT 250 FOR IP): Performed by: FAMILY MEDICINE

## 2019-06-07 PROCEDURE — 80048 BASIC METABOLIC PNL TOTAL CA: CPT

## 2019-06-07 PROCEDURE — 2580000003 HC RX 258: Performed by: INTERNAL MEDICINE

## 2019-06-07 PROCEDURE — 85027 COMPLETE CBC AUTOMATED: CPT

## 2019-06-07 PROCEDURE — 82948 REAGENT STRIP/BLOOD GLUCOSE: CPT

## 2019-06-07 RX ORDER — INSULIN GLARGINE 100 [IU]/ML
55 INJECTION, SOLUTION SUBCUTANEOUS 2 TIMES DAILY
Status: DISCONTINUED | OUTPATIENT
Start: 2019-06-07 | End: 2019-06-11 | Stop reason: HOSPADM

## 2019-06-07 RX ADMIN — ENOXAPARIN SODIUM 40 MG: 40 INJECTION SUBCUTANEOUS at 08:37

## 2019-06-07 RX ADMIN — SERTRALINE 150 MG: 100 TABLET, FILM COATED ORAL at 08:37

## 2019-06-07 RX ADMIN — Medication 10 ML: at 21:00

## 2019-06-07 RX ADMIN — INSULIN GLARGINE 55 UNITS: 100 INJECTION, SOLUTION SUBCUTANEOUS at 20:59

## 2019-06-07 RX ADMIN — VANCOMYCIN HYDROCHLORIDE 1250 MG: 5 INJECTION, POWDER, LYOPHILIZED, FOR SOLUTION INTRAVENOUS at 11:33

## 2019-06-07 RX ADMIN — CEFTRIAXONE SODIUM 1 G: 1 INJECTION, POWDER, FOR SOLUTION INTRAMUSCULAR; INTRAVENOUS at 03:46

## 2019-06-07 RX ADMIN — INSULIN LISPRO 9 UNITS: 100 INJECTION, SOLUTION INTRAVENOUS; SUBCUTANEOUS at 16:54

## 2019-06-07 RX ADMIN — INSULIN GLARGINE 55 UNITS: 100 INJECTION, SOLUTION SUBCUTANEOUS at 08:37

## 2019-06-07 RX ADMIN — INSULIN LISPRO 18 UNITS: 100 INJECTION, SOLUTION INTRAVENOUS; SUBCUTANEOUS at 11:34

## 2019-06-07 RX ADMIN — INSULIN LISPRO 18 UNITS: 100 INJECTION, SOLUTION INTRAVENOUS; SUBCUTANEOUS at 16:54

## 2019-06-07 RX ADMIN — VITAMIN D, TAB 1000IU (100/BT) 5000 UNITS: 25 TAB at 08:37

## 2019-06-07 RX ADMIN — INSULIN LISPRO 12 UNITS: 100 INJECTION, SOLUTION INTRAVENOUS; SUBCUTANEOUS at 11:33

## 2019-06-07 ASSESSMENT — PAIN DESCRIPTION - PROGRESSION

## 2019-06-07 ASSESSMENT — PAIN SCALES - GENERAL: PAINLEVEL_OUTOF10: 0

## 2019-06-07 NOTE — PLAN OF CARE
Problem: Pain:  Goal: Pain level will decrease  Description  Pain level will decrease  6/7/2019 0024 by Brenda Lemus RN  Outcome: Ongoing  Note:   Patient complains of pain rating 7/10. Pain goal 4/10. Problem: Pain:  Goal: Control of acute pain  Description  Control of acute pain  6/7/2019 0024 by Brenda Lemus RN  Outcome: Ongoing  Note:   Patient complaining of pain rating 7/10. Offered ice and repositioning to assist with pain. Denies need for pain medication at this time. PRN Norco available. Problem: Pain:  Goal: Control of chronic pain  Description  Control of chronic pain  6/7/2019 0024 by Brenda Lemus RN  Outcome: Ongoing  Note:   Denies chronic pain. Problem: Infection:  Goal: Will remain free from infection  Description  Will remain free from infection  6/7/2019 0024 by Brenda Lemus RN  Outcome: Ongoing  Note:   Patient has infection present in R amputated lower extremity. Receiving IV Vancomycin and Rocephin. Problem: Skin Integrity:  Goal: Skin integrity will stabilize  Description  Skin integrity will stabilize  6/7/2019 0024 by Brenda Lemus RN  Outcome: Ongoing  Note:   Patient turns and repositions self in bed, no new skin breakdown at this time. Problem: Discharge Planning:  Goal: Patients continuum of care needs are met  Description  Patients continuum of care needs are met  6/7/2019 0024 by Brenda eLmus RN  Outcome: Ongoing  Note:   Patient able to assist in identifying goals to achieve throughout the shift, updated on plan of care. Problem: Falls - Risk of:  Goal: Will remain free from falls  Description  Will remain free from falls  6/7/2019 0024 by Brenda Lemus RN  Outcome: Ongoing  Note:   Patient in bed with bed alarm on, call light within reach and being used appropriately.       Problem: Serum Glucose Level - Abnormal:  Goal: Ability to maintain appropriate glucose levels will improve  Description  Ability to maintain appropriate glucose levels will improve  6/7/2019 0024 by Suzanne Arrieta RN  Outcome: Ongoing  Note:   Patient's blood glucose being checked with meals and before bedtime. Insulin per sliding scale being given, 55 units of lantus being given in AM.    Care plan reviewed with patient. Patient verbalize understanding of the plan of care and contribute to goal setting.

## 2019-06-07 NOTE — PROGRESS NOTES
Pharmacy Vancomycin Consult     Vancomycin Day: 3  Current Dosin mg q12h    Temp max:  98.2    Recent Labs     19  0744 19  0609   BUN 10 13       Recent Labs     19  0744 19  0609   CREATININE 0.8 0.7       Recent Labs     19  0744 19  0609   WBC 11.7* 9.2         Intake/Output Summary (Last 24 hours) at 2019 1239  Last data filed at 2019 2525  Gross per 24 hour   Intake 1123.51 ml   Output 3330 ml   Net -2206.49 ml       Culture Date      Source                       Results  19  BCx2   ngtd    Ht Readings from Last 1 Encounters:   19 5' 6\" (1.676 m)        Wt Readings from Last 1 Encounters:   19 220 lb 3.2 oz (99.9 kg)         Body mass index is 35.54 kg/m². Estimated Creatinine Clearance: 138 mL/min (based on SCr of 0.7 mg/dL). Trough: 10.2 mcg/ml    Assessment/Plan:    Will increase vancomycin dose to 1500 mg q12h because of a prior history of osteomyeltis.

## 2019-06-07 NOTE — PROGRESS NOTES
Discussed with Dr. Beverley Goodman who will evaluate the patient. Possible I and D tomorrow right AKA stump.   Continue abx and pain control per primary team.   Electronically signed by MARILYN Henderson CNP on 6/7/2019 at 11:44 AM

## 2019-06-07 NOTE — CARE COORDINATION
6/7/19, 2:49 PM      Betsey Kuhn day: 1     Location: [facility]22/022-A Reason for admit: Sepsis [facility]) [A41.9]       Treatment Plan of Care: IV antibiotics continued, ID following, accu checks /insulin as ordered, WBC 9.2 (was 11.2), Na improved 135, planning NPO after MN tonite, afebrile, stump open to air. PCP: MARILYN Luna CNP  Readmission Risk Score: 46%      Discharge Plan: Updated patients PCP, Radhika Naidu will be re-evaluated Sat and possibly have OR on his stump Sat per [employer]. Will follow for home care needs/antibiotics.

## 2019-06-07 NOTE — PLAN OF CARE
Problem: Pain:  Goal: Pain level will decrease  Description  Pain level will decrease  Outcome: Ongoing  Note:   Pain in right AKA from infection, norco per MAR. Problem: Infection:  Goal: Will remain free from infection  Description  Will remain free from infection  Outcome: Ongoing  Note:   On Vanc and Rocephin IV for infection in right AKA      Problem: Skin Integrity:  Goal: Skin integrity will stabilize  Description  Skin integrity will stabilize  Outcome: Ongoing  Note:   Right AKA with infection from recent surgery. No new signs or symptoms of skin breakdown noted this shift, encouraging patient to turn and reposition self in bed q2h        Problem: Discharge Planning:  Goal: Patients continuum of care needs are met  Description  Patients continuum of care needs are met  Outcome: Ongoing  Note:   Plans home to sober living house      Problem: Falls - Risk of:  Goal: Will remain free from falls  Description  Will remain free from falls  Outcome: Ongoing  Note:   No falls noted this shift. Continue falling star program. Bed alarm on, bed in low position. Call light and personal belongings in reach. Patient uses call light appropriately. Problem: Serum Glucose Level - Abnormal:  Goal: Ability to maintain appropriate glucose levels will improve  Description  Ability to maintain appropriate glucose levels will improve  Outcome: Ongoing  Note:   Chem ACHS with SS Insulin + 18 units with meals     Care plan reviewed with patient. Patient verbalize understanding of the plan of care and contribute to goal setting.

## 2019-06-07 NOTE — PROGRESS NOTES
Hospitalist Progress Note      Patient:  Manuela Pradhan      Unit/Bed:6K-22/022-A    YOB: 1970    MRN: [id number]       Acct: [de-identified]     PCP: No primary care provider on file. Date of Admission: 6/5/2019    Assessment/Plan:    1. Sepsis with tachycardia, leukocytosis, lactic acidosis, right stump/leg cellulitis -- apprec ID and ortho assist -- cont rocephin 6/6, vanc 6/5 --> ortho eval daily for possible need for I&D   -- ID Dr. Yuri Diggs returns 6/11 for further decision on atbx for d/c - unable to to IV at d/c due to living situation  -- CT right femur 6/6/19 = soft tissue swelling lateral right thigh and stump c/w cellulitis -> no bone destruction to suggest OM, no abscess  -- LA 3.3 on admission 6/5 and improved to WNL 6/6 with IVF, atbx;  WBC improving to WNL as of 6/7 from 15.2 on admission 6/5 --> cont monitor - fevers improved  -- Blood cx 6/5 (-) to date -- No wound culture obtained -> hx of strep and enterococcus in past  -- cellulitis right stump improving --> ortho following for possible I&D but site opened on own per pt on 6/7  -- u/a (-) 6/6  2. Right stump AKA cellulitis, swelling and open wound -- per ID and ortho -- CT right stump 6/6/19 = soft tissue swelling lateral right thigh and stump c/w cellulitis -> no bone destruction to suggest OM, no abscess  -- site broke open after hitting it on his WC per pt 6/7 --> NO culture able to be obtained at the time of when was draining   -- prior admits 4/2019 with strep and in 4/2018 with strep and enterococcus  3. Lactic/metabolic acidosis -- due to sepsis - ?due to tissue damage from cellulitis -- s/p IVF boluses in ER  4.  Hyponatremia -- due to hypovolemia, hyperglycemia -- improved from 129 on 6/5 and 6/6 to normal 135 6/7 --> s/p IVF and improvement of BS -- monitor - asx.  5. Type 2 DM, uncontrolled -- adjusted pt's insulin to home regimen 6/7 of lantus 55 units bid, 18 units with meals +high SSI -- cont monitor and adjust prn - ZSD 07,357, hemoglobin 13.6, platelets 955,920.  BMP notes mild hyponatremia with a sodium of 129.  BUN is 12 and serum creatinine 1.0.  Glucose 472.  Lactic acid 3.3.  Cultures pending.  Patient to be admitted for sepsis due to underlying infection to the surgical site of right AKA.     -- ID Dr. Mago Sanchez consulted - cont on rocephin and vancomycin;  Ortho Dr. Theresa Peraza consulted (knows pt well) for possible surgical I&D. -- pt \"bumped\" the lateral stump area on his WC on 6/7 and it \"burst open\" and per pt drained a lot of purulent material and serosanguinous material.        Subjective:   -- 6/7 --> pt states he hit the lateral aspect of his stump on the side of his wheelchair and it opened up and he stated Erlinda stuff squirted all over the floor. \"  SInce the pressure has improved and redness is improving. Still draining bloody yellow drainage. Still sore and using percocet. Denies cp, sob. Denies abd pain, n/v.  Denies f/c.  Doni po. On RA. Afebrile.         Medications:  Reviewed    Infusion Medications    dextrose       Scheduled Medications    insulin glargine  55 Units Subcutaneous BID    sertraline  150 mg Oral Daily    vitamin D  5,000 Units Oral Daily    vancomycin  1,250 mg Intravenous Q12H    cefTRIAXone (ROCEPHIN) IV  1 g Intravenous Q24H    sodium chloride flush  10 mL Intravenous 2 times per day    enoxaparin  40 mg Subcutaneous Daily    vancomycin (VANCOCIN) intermittent dosing (placeholder)   Other RX Placeholder    insulin lispro  0-18 Units Subcutaneous TID     insulin lispro  0-9 Units Subcutaneous Nightly    insulin lispro  18 Units Subcutaneous TID      PRN Meds: acetaminophen, HYDROcodone-acetaminophen, glucose, dextrose, glucagon (rDNA), dextrose, sodium chloride flush, magnesium hydroxide, ondansetron      Intake/Output Summary (Last 24 hours) at 6/7/2019 0736  Last data filed at 6/7/2019 0491  Gross per 24 hour   Intake 2643.51 ml   Output 4630 ml   Net -1986.49 ml Diet:  Diet NPO, After Midnight    Exam:  /69   Pulse 85   Temp 98.4 °F (36.9 °C) (Oral)   Resp 16   Ht 5' 6\" (1.676 m)   Wt 220 lb 3.2 oz (99.9 kg)   SpO2 95%   BMI 35.54 kg/m²     General appearance: No apparent distress, appears older than stated age and cooperative. HEENT: Pupils equal, round, and reactive to light. Conjunctivae/corneas clear. Neck: Supple, with full range of motion. No jugular venous distention. Trachea midline. Respiratory:  Normal respiratory effort. Clear to auscultation, bilaterally without Rales/Wheezes/Rhonchi. No respiratory distress or accessory muscle use. Cardiovascular: Regular rate and rhythm with normal S1/S2 without murmurs, rubs or gallops. Abdomen: Soft, non-tender, non-distended with normal bowel sounds. No rebound or guarding  Musculoskeletal: right AKA -> erythema much improved with only trace erythema along open area in lateral incision area with serosang drainage now, slight edema, minimal TTP. Skin: Skin color, texture, turgor normal.  No rashes or lesions. Neurologic:  Neurovascularly intact without any focal sensory/motor deficits. Cranial nerves: II-XII intact, grossly non-focal.  Psychiatric: Alert and oriented, thought content appropriate, normal insight  Cap refill <3 sec      Labs:   Recent Labs     06/05/19 2216 06/06/19  0744 06/07/19  0609   WBC 15.2* 11.7* 9.2   HGB 13.6* 13.1* 12.7*   HCT 38.9* 39.0* 36.8*    251 232     Recent Labs     06/05/19 2216 06/06/19  0744 06/07/19  0609   * 129* 135   K 4.3 4.0 4.2   CL 90* 96* 101   CO2 23 20* 21*   BUN 12 10 13   CREATININE 1.0 0.8 0.7   CALCIUM 9.4 8.9 9.2     No results for input(s): AST, ALT, BILIDIR, BILITOT, ALKPHOS in the last 72 hours. No results for input(s): INR in the last 72 hours. No results for input(s): [place] in the last 72 hours.     Urinalysis:      Lab Results   Component Value Date    NITRU NEGATIVE 06/06/2019    WBCUA 2-4 04/16/2019

## 2019-06-08 LAB
GLUCOSE BLD-MCNC: 245 MG/DL (ref 70–108)
GLUCOSE BLD-MCNC: 277 MG/DL (ref 70–108)
GLUCOSE BLD-MCNC: 284 MG/DL (ref 70–108)

## 2019-06-08 PROCEDURE — 6370000000 HC RX 637 (ALT 250 FOR IP): Performed by: INTERNAL MEDICINE

## 2019-06-08 PROCEDURE — 2580000003 HC RX 258: Performed by: INTERNAL MEDICINE

## 2019-06-08 PROCEDURE — 6360000002 HC RX W HCPCS: Performed by: INTERNAL MEDICINE

## 2019-06-08 PROCEDURE — 82948 REAGENT STRIP/BLOOD GLUCOSE: CPT

## 2019-06-08 PROCEDURE — 99232 SBSQ HOSP IP/OBS MODERATE 35: CPT | Performed by: FAMILY MEDICINE

## 2019-06-08 PROCEDURE — 6370000000 HC RX 637 (ALT 250 FOR IP): Performed by: FAMILY MEDICINE

## 2019-06-08 PROCEDURE — 1200000003 HC TELEMETRY R&B

## 2019-06-08 RX ADMIN — HYDROCODONE BITARTRATE AND ACETAMINOPHEN 1 TABLET: 5; 325 TABLET ORAL at 15:51

## 2019-06-08 RX ADMIN — Medication 10 ML: at 08:26

## 2019-06-08 RX ADMIN — VANCOMYCIN HYDROCHLORIDE 1500 MG: 5 INJECTION, POWDER, LYOPHILIZED, FOR SOLUTION INTRAVENOUS at 00:06

## 2019-06-08 RX ADMIN — VANCOMYCIN HYDROCHLORIDE 1500 MG: 5 INJECTION, POWDER, LYOPHILIZED, FOR SOLUTION INTRAVENOUS at 15:20

## 2019-06-08 RX ADMIN — INSULIN GLARGINE 55 UNITS: 100 INJECTION, SOLUTION SUBCUTANEOUS at 20:44

## 2019-06-08 RX ADMIN — INSULIN GLARGINE 55 UNITS: 100 INJECTION, SOLUTION SUBCUTANEOUS at 08:23

## 2019-06-08 RX ADMIN — SERTRALINE 150 MG: 100 TABLET, FILM COATED ORAL at 08:24

## 2019-06-08 RX ADMIN — HYDROCODONE BITARTRATE AND ACETAMINOPHEN 1 TABLET: 5; 325 TABLET ORAL at 08:24

## 2019-06-08 RX ADMIN — VITAMIN D, TAB 1000IU (100/BT) 5000 UNITS: 25 TAB at 08:24

## 2019-06-08 RX ADMIN — CEFTRIAXONE SODIUM 1 G: 1 INJECTION, POWDER, FOR SOLUTION INTRAMUSCULAR; INTRAVENOUS at 03:42

## 2019-06-08 RX ADMIN — Medication 10 ML: at 20:44

## 2019-06-08 RX ADMIN — INSULIN LISPRO 5 UNITS: 100 INJECTION, SOLUTION INTRAVENOUS; SUBCUTANEOUS at 20:45

## 2019-06-08 RX ADMIN — INSULIN LISPRO 9 UNITS: 100 INJECTION, SOLUTION INTRAVENOUS; SUBCUTANEOUS at 16:53

## 2019-06-08 RX ADMIN — INSULIN LISPRO 18 UNITS: 100 INJECTION, SOLUTION INTRAVENOUS; SUBCUTANEOUS at 16:53

## 2019-06-08 ASSESSMENT — PAIN DESCRIPTION - PROGRESSION: CLINICAL_PROGRESSION: NOT CHANGED

## 2019-06-08 ASSESSMENT — PAIN SCALES - GENERAL
PAINLEVEL_OUTOF10: 7
PAINLEVEL_OUTOF10: 7
PAINLEVEL_OUTOF10: 4
PAINLEVEL_OUTOF10: 4
PAINLEVEL_OUTOF10: 5
PAINLEVEL_OUTOF10: 8

## 2019-06-08 ASSESSMENT — PAIN - FUNCTIONAL ASSESSMENT: PAIN_FUNCTIONAL_ASSESSMENT: PREVENTS OR INTERFERES SOME ACTIVE ACTIVITIES AND ADLS

## 2019-06-08 ASSESSMENT — PAIN DESCRIPTION - PAIN TYPE: TYPE: ACUTE PAIN

## 2019-06-08 ASSESSMENT — PAIN DESCRIPTION - FREQUENCY: FREQUENCY: CONTINUOUS

## 2019-06-08 ASSESSMENT — PAIN DESCRIPTION - ORIENTATION: ORIENTATION: RIGHT

## 2019-06-08 ASSESSMENT — PAIN DESCRIPTION - DESCRIPTORS: DESCRIPTORS: SHARP

## 2019-06-08 ASSESSMENT — PAIN DESCRIPTION - ONSET: ONSET: ON-GOING

## 2019-06-08 ASSESSMENT — PAIN DESCRIPTION - LOCATION: LOCATION: LEG

## 2019-06-08 NOTE — PLAN OF CARE
Problem: Pain:  Goal: Pain level will decrease  Description  Pain level will decrease  6/8/2019 0012 by Samantha Valenzuela RN  Outcome: Ongoing  Note:   Pt denies pain so far this shift. Will continue to monitor. Problem: Pain:  Goal: Control of acute pain  Description  Control of acute pain  6/8/2019 0012 by Samantha Valenzuela RN  Outcome: Ongoing     Problem: Pain:  Goal: Control of chronic pain  Description  Control of chronic pain  6/8/2019 0012 by Samantha Valenzuela RN  Outcome: Ongoing     Problem: Infection:  Goal: Will remain free from infection  Description  Will remain free from infection  6/8/2019 0012 by Samantha Valenzuela RN  Outcome: Ongoing  Note:   IV antibiotics on MAR. ID on case. Continues to have drainage from open area to right stump. Problem: Skin Integrity:  Goal: Skin integrity will stabilize  Description  Skin integrity will stabilize  6/8/2019 0012 by Samantha Valenzuela RN  Outcome: Ongoing  Note:   No new skin breakdown noted this shift. Pt able to reposition independently. Repositions frequently. Problem: Discharge Planning:  Goal: Patients continuum of care needs are met  Description  Patients continuum of care needs are met  6/8/2019 0012 by Samantha Valenzuela RN  Outcome: Ongoing  Note:   Discharge planning continues. Plan is home. Problem: Falls - Risk of:  Goal: Will remain free from falls  Description  Will remain free from falls  6/8/2019 0012 by Samantha Valenzuela RN  Outcome: Ongoing  Note:   Pt able to stand and pivot to wheelchair. No falls this shift. Patient educated on not getting up without help. Falling star protocol in place. Call light in reach. Nonskid footwear on when patient OOB. Bed in lowest position. Bed alarm set. Patient visually checked on hourly rounds.        Problem: Falls - Risk of:  Goal: Absence of physical injury  Description  Absence of physical injury  6/8/2019 0012 by Samantha Valenzuela RN  Outcome: Ongoing     Problem: Serum Glucose Level - Abnormal:  Goal: Ability to maintain appropriate glucose levels will improve  Description  Ability to maintain appropriate glucose levels will improve  6/8/2019 0012 by Balbina Estrada RN  Outcome: Ongoing  Note:   Monitoring blood sugar AC and HS. Sliding scale insulin and lantus on MAR. Problem: DISCHARGE BARRIERS  Goal: Patient's continuum of care needs are met  6/8/2019 0012 by Balbina Estrada RN  Outcome: Ongoing     Care plan reviewed with patient. Patient verbalizes understanding of the plan of care and contribute to goal setting.

## 2019-06-08 NOTE — PROGRESS NOTES
Pt pain mild, no other complaints    A and O x 3  VSS  Right AKA no drainage, No erythema, Small 1 inch area opening with no drainage.      A: Right AKA  P: NO OR, continue to monitor    Jose Rahman PA-C with Dr. Roselyn Green

## 2019-06-08 NOTE — PROGRESS NOTES
Hospitalist Progress Note      Patient:  Jordan Quiroz      Unit/Bed:6K-22/022-A    YOB: 1970    MRN: [id number]       Acct: [de-identified]     PCP: MARILYN Boyle CNP    Date of Admission: 6/5/2019    Assessment/Plan:    1. Sepsis with tachycardia, leukocytosis, lactic acidosis, right stump/leg cellulitis -- apprec ID and ortho assist -- cont rocephin 6/6, vanc 6/5 --> ortho eval daily for possible need for I&D - canceled 6/7 and awaiting I&D 6/8  -- ID Dr. Bishop Tavares returns 6/11 for further decision on atbx for d/c - unable to to IV at d/c due to living situation  -- CT right femur 6/6/19 = soft tissue swelling lateral right thigh and stump c/w cellulitis -> no bone destruction to suggest OM, no abscess  -- LA 3.3 on admission 6/5 and improved to WNL 6/6 with IVF, atbx;  WBC improving to WNL as of 6/7 from 15.2 on admission 6/5 --> cont monitor - fevers improved  -- Blood cx 6/5 (-) to date -- No wound culture obtained -> hx of strep and enterococcus in past  -- cellulitis right stump improving --> ortho following for possible I&D but site opened on own per pt on 6/7  -- u/a (-) 6/6  2. Right stump AKA cellulitis, swelling and open wound -- per ID and ortho -- CT right stump 6/6/19 = soft tissue swelling lateral right thigh and stump c/w cellulitis -> no bone destruction to suggest OM, no abscess  -- site broke open after hitting it on his WC per pt 6/7 --> NO culture able to be obtained at the time of when was draining   -- prior admits 4/2019 with strep and in 4/2018 with strep and enterococcus  3. Lactic/metabolic acidosis -- due to sepsis - ?due to tissue damage from cellulitis -- s/p IVF boluses in ER  4.  Hyponatremia -- due to hypovolemia, hyperglycemia -- improved from 129 on 6/5 and 6/6 to normal 135 6/7 --> s/p IVF and improvement of BS -- monitor - asx.  5. Type 2 DM, uncontrolled -- adjusted pt's insulin to home regimen 6/7 of lantus 55 units bid, 18 units with meals +high SSI -- cont monitor and adjust prn - last A1C 4/2 = 9.1   6. Hx Essential HTN -- no meds prior to admission -- BP controlled and stable w/o medication  7. Normocytic anemia -- chronic -- 12.7 on 6/7 --> down from 13.6 on admission 6/5 -- has been 10 to 13 since 2/2019 -- monitor with above  8. Diastolic dysfunction -- per echo from [facility] 10/2018 -- EF 50-55%, no valve abn -- monitor fluid status  9. Bipolar d/o -- cont zoloft -- mood stable  10. Hx multiple foot wounds and OM due to DM and s/p right AKA - per ID and ortho  11. Vitamin d deficiency -- last 4/29/19 low at 16 --> cont po supplement  12. Obesity Body mass index is 35.2 kg/m². 13. Tobacco abuse -- counseled on cessation  14. Hx marijuana abuse -- UDS (-) since 9/2018 at least - none done this admission  15. Hx non-compliance -- cont educate - improving    Dispo  -- 6/8 --> apprec consultants -- I&D canceled yesterday and per ortho prelim plan for I&D today - NPO -- cont monitor lytes, renal fxn, WBC, BS, h/h. Pt low risk for surgery. -- 6/7 --> apprec consultants -- cont rocephin, vanc -- awaiting ortho recs to see if need I&D - pt self I&D by hitting the area and it broke open and drained it self -> ?need further I&D and wound closure/irrigation. ID OOT until 6/11 - no cx done when wound broke open and drained. Sepsis improving - afeb, WBC improved. Resume home lantus as bid, cont pre-meal bolus and SSI.       Chief Complaint: right stump redness, swelling    Hospital Course: Talon Owens is a 44 y.o. male with DM2, bipolar d/o, HTN, GERD, hx right AKA for wound infections from DM presenting with redness, tenderness and warmth to the surgical site ×2 days.  Patient presented to the emergency department day prior to admission and was treated for cellulitis. Virgilio Sella was discharged home with a prescription of Keflex and Bactrim.  Parameter of the infection was marked with border drawn.  Patient was advised to return to the emergency department with increased redness or pain to the site.  Patient states that he's had increasing symptoms including subjective fevers and came into the ED for evaluation and treatment. In ER, CBC with noted leukocytosis, XDY 05,099, hemoglobin 13.6, platelets 088,624.  BMP notes mild hyponatremia with a sodium of 129.  BUN is 12 and serum creatinine 1.0.  Glucose 472.  Lactic acid 3.3.  Cultures pending.  Patient to be admitted for sepsis due to underlying infection to the surgical site of right AKA.     -- ID Dr. Claudette Milian consulted - cont on rocephin and vancomycin;  Ortho Dr. Saint Anthony consulted (knows pt well) for possible surgical I&D. -- pt \"bumped\" the lateral stump area on his WC on 6/7 and it \"burst open\" and per pt drained a lot of purulent material and serosanguinous material.      Subjective:   -- 6/8 --> pt NPO again waiting for ortho to decide if going to I&D - not done yesterday. Less drainage from open area on lateral stump but still with some at times. Denies cp, sob. Denies abd pain, n/v.  Denies f/c.  Doni po. On RA. Afebrile. Moving at his baseline.       Medications:  Reviewed    Infusion Medications    dextrose       Scheduled Medications    insulin glargine  55 Units Subcutaneous BID    vancomycin  1,500 mg Intravenous Q12H    sertraline  150 mg Oral Daily    vitamin D  5,000 Units Oral Daily    cefTRIAXone (ROCEPHIN) IV  1 g Intravenous Q24H    sodium chloride flush  10 mL Intravenous 2 times per day    enoxaparin  40 mg Subcutaneous Daily    vancomycin (VANCOCIN) intermittent dosing (placeholder)   Other RX Placeholder    insulin lispro  0-18 Units Subcutaneous TID     insulin lispro  0-9 Units Subcutaneous Nightly    insulin lispro  18 Units Subcutaneous TID WC     PRN Meds: acetaminophen, HYDROcodone-acetaminophen, glucose, dextrose, glucagon (rDNA), dextrose, sodium chloride flush, magnesium hydroxide, ondansetron      Intake/Output Summary (Last 24 hours) at 6/8/2019 1120  Last data filed at 6/8/2019 0828  Gross per 24 hour   Intake 1573.74 ml   Output 3280 ml   Net -1706.26 ml       Diet:  Diet NPO, After Midnight    Exam:  /77   Pulse 85   Temp 97.8 °F (36.6 °C) (Oral)   Resp 16   Ht 5' 6\" (1.676 m)   Wt 217 lb 1.6 oz (98.5 kg)   SpO2 99%   BMI 35.04 kg/m²     General appearance: No apparent distress, appears older than stated age and cooperative. HEENT: Pupils equal, round, and reactive to light. Conjunctivae/corneas clear. Neck: Supple, with full range of motion. No jugular venous distention. Trachea midline. Respiratory:  Normal respiratory effort. Clear to auscultation, bilaterally without Rales/Wheezes/Rhonchi. No respiratory distress or accessory muscle use. Cardiovascular: Regular rate and rhythm with normal S1/S2 without murmurs, rubs or gallops. Abdomen: Soft, non-tender, non-distended with normal bowel sounds. No rebound or guarding  Musculoskeletal: right stump with erythema nearly gone from osiel and only minimal redness around wound on lateral edge of healed incision. Mild TTP. Open wound lateral right stump. Full range of motion without deformity. No calf tenderness palpation  Skin: Skin color, texture, turgor normal.  No rashes or lesions. Open wound lateral stump along incision line. Wounds on right 1st and middle finger. Neurologic:  Neurovascularly intact without any focal sensory/motor deficits.  Cranial nerves: II-XII intact, grossly non-focal.  Psychiatric: Alert and oriented, thought content appropriate, normal insight  Capillary Refill: Brisk,< 3 seconds         Labs:   Recent Labs     06/05/19 2216 06/06/19  0744 06/07/19  0609   WBC 15.2* 11.7* 9.2   HGB 13.6* 13.1* 12.7*   HCT 38.9* 39.0* 36.8*    251 232     Recent Labs     06/05/19 2216 06/06/19  0744 06/07/19  0609   * 129* 135   K 4.3 4.0 4.2   CL 90* 96* 101   CO2 23 20* 21*   BUN 12 10 13   CREATININE 1.0 0.8 0.7   CALCIUM 9.4 8.9 9.2     No results for input(s): AST, ALT, BILIDIR,

## 2019-06-08 NOTE — PROGRESS NOTES
Spoke to surgery this morning they said patient was on for Surgery today at [phone] with Dr. Roselyn Green. Called OR again at noon and they state Genell Born evaluated patient and surgery does not need to be done today. As far as I have been told, Surgery is on hold until Dr. Diane Jay evaluates on Monday.

## 2019-06-09 LAB
GLUCOSE BLD-MCNC: 185 MG/DL (ref 70–108)
GLUCOSE BLD-MCNC: 207 MG/DL (ref 70–108)
GLUCOSE BLD-MCNC: 241 MG/DL (ref 70–108)
GLUCOSE BLD-MCNC: 255 MG/DL (ref 70–108)

## 2019-06-09 PROCEDURE — 6370000000 HC RX 637 (ALT 250 FOR IP): Performed by: FAMILY MEDICINE

## 2019-06-09 PROCEDURE — 99232 SBSQ HOSP IP/OBS MODERATE 35: CPT | Performed by: FAMILY MEDICINE

## 2019-06-09 PROCEDURE — 2580000003 HC RX 258: Performed by: INTERNAL MEDICINE

## 2019-06-09 PROCEDURE — 6360000002 HC RX W HCPCS: Performed by: INTERNAL MEDICINE

## 2019-06-09 PROCEDURE — 1200000003 HC TELEMETRY R&B

## 2019-06-09 PROCEDURE — 82948 REAGENT STRIP/BLOOD GLUCOSE: CPT

## 2019-06-09 PROCEDURE — 6370000000 HC RX 637 (ALT 250 FOR IP): Performed by: INTERNAL MEDICINE

## 2019-06-09 RX ADMIN — INSULIN GLARGINE 55 UNITS: 100 INJECTION, SOLUTION SUBCUTANEOUS at 19:57

## 2019-06-09 RX ADMIN — INSULIN LISPRO 18 UNITS: 100 INJECTION, SOLUTION INTRAVENOUS; SUBCUTANEOUS at 09:00

## 2019-06-09 RX ADMIN — VANCOMYCIN HYDROCHLORIDE 1500 MG: 5 INJECTION, POWDER, LYOPHILIZED, FOR SOLUTION INTRAVENOUS at 04:07

## 2019-06-09 RX ADMIN — INSULIN LISPRO 6 UNITS: 100 INJECTION, SOLUTION INTRAVENOUS; SUBCUTANEOUS at 11:40

## 2019-06-09 RX ADMIN — INSULIN GLARGINE 55 UNITS: 100 INJECTION, SOLUTION SUBCUTANEOUS at 08:59

## 2019-06-09 RX ADMIN — INSULIN LISPRO 6 UNITS: 100 INJECTION, SOLUTION INTRAVENOUS; SUBCUTANEOUS at 08:59

## 2019-06-09 RX ADMIN — SERTRALINE 150 MG: 100 TABLET, FILM COATED ORAL at 09:00

## 2019-06-09 RX ADMIN — ENOXAPARIN SODIUM 40 MG: 40 INJECTION SUBCUTANEOUS at 08:59

## 2019-06-09 RX ADMIN — INSULIN LISPRO 18 UNITS: 100 INJECTION, SOLUTION INTRAVENOUS; SUBCUTANEOUS at 16:36

## 2019-06-09 RX ADMIN — CEFTRIAXONE SODIUM 1 G: 1 INJECTION, POWDER, FOR SOLUTION INTRAMUSCULAR; INTRAVENOUS at 03:34

## 2019-06-09 RX ADMIN — VANCOMYCIN HYDROCHLORIDE 1500 MG: 5 INJECTION, POWDER, LYOPHILIZED, FOR SOLUTION INTRAVENOUS at 15:30

## 2019-06-09 RX ADMIN — INSULIN LISPRO 5 UNITS: 100 INJECTION, SOLUTION INTRAVENOUS; SUBCUTANEOUS at 19:58

## 2019-06-09 RX ADMIN — Medication 10 ML: at 09:00

## 2019-06-09 RX ADMIN — VITAMIN D, TAB 1000IU (100/BT) 5000 UNITS: 25 TAB at 09:00

## 2019-06-09 RX ADMIN — Medication 10 ML: at 19:59

## 2019-06-09 RX ADMIN — INSULIN LISPRO 3 UNITS: 100 INJECTION, SOLUTION INTRAVENOUS; SUBCUTANEOUS at 16:36

## 2019-06-09 RX ADMIN — INSULIN LISPRO 18 UNITS: 100 INJECTION, SOLUTION INTRAVENOUS; SUBCUTANEOUS at 11:40

## 2019-06-09 ASSESSMENT — PAIN SCALES - GENERAL: PAINLEVEL_OUTOF10: 4

## 2019-06-09 NOTE — PLAN OF CARE
Goal: Control of acute pain  Description  Control of acute pain  6/9/2019 1013 by Shelton Wing RN  Outcome: Ongoing  Note:   Pain of 8 in Right AKA stump. Controlled with Norco. Will continue to monitor. Patient's pain goal is a 5. Problem: Infection:  Goal: Will remain free from infection  Description  Will remain free from infection  6/9/2019 1013 by Shelton Wing RN  Outcome: Ongoing  Note:   Afebrile, WBC 9.2. Will continue to monitor. Problem: Skin Integrity:  Goal: Skin integrity will stabilize  Description  Skin integrity will stabilize  6/9/2019 1013 by Shelton Wing RN  Outcome: Ongoing  Note:   No new skin breakdown, still small open area to Right AKA stump. Patient moves/turns frequently. Problem: Discharge Planning:  Goal: Patients continuum of care needs are met  Description  Patients continuum of care needs are met  6/9/2019 1013 by Shelton Wing RN  Outcome: Ongoing  Note:   Planning discharge home once medically stable. Dr. Elisha Starkey to determine length of need for IV antibiotics. Problem: Falls - Risk of:  Goal: Will remain free from falls  Description  Will remain free from falls  6/9/2019 1013 by Shelton Wing RN  Outcome: Ongoing  Note:   Up to wheelchair today without difficulty. Educated on importance for assistance when ambulating. Problem: Serum Glucose Level - Abnormal:  Goal: Ability to maintain appropriate glucose levels will improve  Description  Ability to maintain appropriate glucose levels will improve  6/9/2019 1013 by Shelton Wing RN  Outcome: Ongoing  Note:   Glucose in the 200's not all Insulin was given yesterday due to NPO status. Will continue to monitor today. Care plan reviewed with patient and family. Patient and family verbalize understanding of the plan of care and contribute to goal setting.

## 2019-06-09 NOTE — PLAN OF CARE
physical injury  Outcome: Ongoing     Problem: Serum Glucose Level - Abnormal:  Goal: Ability to maintain appropriate glucose levels will improve  Description  Ability to maintain appropriate glucose levels will improve  6/8/2019 2103 by Katelyn Sanchez RN  Outcome: Ongoing  Note:   Monitoring blood sugar. Sliding scale insulin and lantus on MAR. Pt educated on decreasing sugar intake. Problem: DISCHARGE BARRIERS  Goal: Patient's continuum of care needs are met  Outcome: Ongoing  Note:   [facility] medically [facility]. Care plan reviewed with patient. Patient verbalizes understanding of the plan of care and contribute to goal setting.

## 2019-06-09 NOTE — PROGRESS NOTES
Pt no pain, no other complaints    A and O x 3  VSS  Right AKA no drainage, No erythema, Small 1 inch area opening with no drainage.      A: Right AKA  P: NO OR  Follow up with Dr. Ciera Bone in 1 to 2 weeks  Continue antibiotics per Dr. Kevin Chen    Will sign off at this time    Brandon Richard PA-C with Dr. Real Morales

## 2019-06-09 NOTE — PROGRESS NOTES
Hospitalist Progress Note      Patient:  Ray Yepez      Unit/Bed:6K-22/022-A    YOB: 1970    MRN: [id number]       Acct: [de-identified]     PCP: MARILYN Sigala CNP    Date of Admission: 6/5/2019    Assessment/Plan:    1. Sepsis with tachycardia, leukocytosis, lactic acidosis, right stump/leg cellulitis -- apprec ID and ortho assist -- cont rocephin 6/6, vanc 6/5 --> ortho eval daily for possible need for I&D and Dr. Charlyn Fothergill to see 6/10 for final decision if surgical I&D needed   -- ID Dr. Cristina Bolivar returns 6/11 for further decision on atbx for d/c - unable to to IV at d/c due to living situation  -- CT right femur 6/6/19 = soft tissue swelling lateral right thigh and stump c/w cellulitis -> no bone destruction to suggest OM, no abscess  -- LA 3.3 on admission 6/5 and improved to WNL 6/6 with IVF, atbx;  WBC improving to WNL as of 6/7 from 15.2 on admission 6/5 --> cont monitor - fevers improved  -- Blood cx 6/5 (-) to date -- No wound culture obtained -> hx of strep and enterococcus in past  -- cellulitis right stump improving --> ortho following for possible I&D but site opened on own per pt on 6/7  -- u/a (-) 6/6  2. Right stump AKA cellulitis, swelling and open wound -- per ID and ortho -- CT right stump 6/6/19 = soft tissue swelling lateral right thigh and stump c/w cellulitis -> no bone destruction to suggest OM, no abscess  -- site broke open after hitting it on his WC per pt 6/7 --> NO culture able to be obtained at the time of when was draining -- prior admits 4/2019 with strep and in 4/2018 with strep and enterococcus  3. Lactic/metabolic acidosis -- due to sepsis - ?due to tissue damage from cellulitis  4.  Hyponatremia -- due to hypovolemia, hyperglycemia -- improved from 129 on 6/5 and 6/6 to normal 135 6/7 --> s/p IVF and improvement of BS -- monitor - asx.  5. Type 2 DM, uncontrolled -- adjusted pt's insulin to home regimen 6/7 of lantus 55 units bid, 18 units with meals +high SSI -- cont monitor and adjust prn - last A1C 4/2 = 9.1   6. Hx Essential HTN -- no meds prior to admission -- BP controlled and stable  7. Normocytic anemia -- chronic -- 12.7 on 6/7 --> down from 13.6 on admission 6/5 -- has been 10 to 13 since 2/2019 -- monitor with above  8. Diastolic dysfunction -- per echo from [facility] 10/2018 -- EF 50-55%, no valve abn -- monitor fluid status  9. Bipolar d/o -- cont zoloft -- mood stable  10. Hx multiple foot wounds and OM due to DM and s/p right AKA - per ID and ortho  11. Vitamin d deficiency -- last 4/29/19 low at 16 --> cont po supplement  12. Obesity Body mass index is 35.2 kg/m². 13. Tobacco abuse -- counseled on cessation  14. Hx marijuana abuse -- UDS (-) since 9/2018 at least - none done this admission  15. Hx non-compliance -- cont educate - improving    Dispo  -- 6/9 --> apprec consultants -- awaiting final ortho decision if site needs surgical I&D - per RN this will be done by Dr. Ashanti Westfall tomorrow; No cx obtained from infected site/drainage obtained - cont rocephin, vanc -- await Dr. Mary Last ID return 6/11 for final recs for atbx for d/c. Limited d/c option due to pt's prior hx - SS following. Cont monitor BS, BP, lytes, renal fxn, h/h. Overall much improved. -- 6/8 --> apprec consultants -- I&D canceled yesterday and per ortho prelim plan for I&D today - NPO -- cont monitor lytes, renal fxn, WBC, BS, h/h. Pt low risk for surgery. -- 6/7 --> apprec consultants -- cont rocephin, vanc -- awaiting ortho recs to see if need I&D - pt self I&D by hitting the area and it broke open and drained it self -> ?need further I&D and wound closure/irrigation. ID OOT until 6/11 - no cx done when wound broke open and drained. Sepsis improving - afeb, WBC improved. Resume home lantus as bid, cont pre-meal bolus and SSI.       Chief Complaint: right AKA infection    Hospital Course: Sho Antonio is a 44 y.o. male with DM2, bipolar d/o, HTN, GERD, hx right AKA for wound infections from DM presenting with redness, tenderness and warmth to the surgical site ×2 days. Patient presented to the emergency department day prior to admission and was treated for cellulitis. Patient was discharged home with a prescription of Keflex and Bactrim. Parameter of the infection was marked with border drawn. Patient was advised to return to the emergency department with increased redness or pain to the site. Patient states that he's had increasing symptoms including subjective fevers and came into the ED for evaluation and treatment. In ER, CBC with noted leukocytosis, WBC 15,200, hemoglobin 13.6, platelets 709,030. BMP notes mild hyponatremia with a sodium of 129. BUN is 12 and serum creatinine 1.0.  Glucose 472. Lactic acid 3.3. Cultures pending. Patient to be admitted for sepsis due to underlying infection to the surgical site of right AKA. -- ID Dr. Magno Robert consulted - cont on rocephin and vancomycin;  Ortho Dr. Chato Bellamy consulted (knows pt well) for possible surgical I&D. -- pt \"bumped\" the lateral stump area on his WC on 6/7 and it \"burst open\" and per pt drained a lot of purulent material and serosanguinous material and since then site has greatly improved in redness, edema, pain. Subjective:   -- 6/9 --> pt continues to feel much better - right stump has decreased drainage but has open area long old incision area. Redness has gone away. Pain is much better but still sore. Swelling is better. .  Denies cp, sob. Denies abd pain, n/v.  Denies f/c.  Doni po. On RA. Afebrile. No BM since admission.       Medications:  Reviewed    Infusion Medications    dextrose       Scheduled Medications    insulin glargine  55 Units Subcutaneous BID    vancomycin  1,500 mg Intravenous Q12H    sertraline  150 mg Oral Daily    vitamin D  5,000 Units Oral Daily    cefTRIAXone (ROCEPHIN) IV  1 g Intravenous Q24H    sodium chloride flush  10 mL Intravenous 2 times per day    enoxaparin  40 mg Subcutaneous Daily    vancomycin (VANCOCIN) intermittent dosing (placeholder)   Other RX Placeholder    insulin lispro  0-18 Units Subcutaneous TID     insulin lispro  0-9 Units Subcutaneous Nightly    insulin lispro  18 Units Subcutaneous TID      PRN Meds: acetaminophen, HYDROcodone-acetaminophen, glucose, dextrose, glucagon (rDNA), dextrose, sodium chloride flush, magnesium hydroxide, ondansetron      Intake/Output Summary (Last 24 hours) at 6/9/2019 1039  Last data filed at 6/9/2019 0816  Gross per 24 hour   Intake 3079.21 ml   Output 1970 ml   Net 1109.21 ml       Diet:  DIET CARB CONTROL; Exam:  /63   Pulse 86   Temp 98 °F (36.7 °C) (Oral)   Resp 18   Ht 5' 6\" (1.676 m)   Wt 217 lb 1.6 oz (98.5 kg)   SpO2 99%   BMI 35.04 kg/m²     General appearance: No apparent distress, appears older than stated age and cooperative. Up in his WC. HEENT: Pupils equal, round, and reactive to light. Conjunctivae/corneas clear. Neck: Supple, with full range of motion. No jugular venous distention. Trachea midline. Respiratory:  Normal respiratory effort. Clear to auscultation, bilaterally without Rales/Wheezes/Rhonchi. No respiratory distress or accessory muscle use. Cardiovascular: Regular rate and rhythm with normal S1/S2 without murmurs, rubs or gallops. Abdomen: Soft, non-tender, non-distended with normal bowel sounds. No rebound or guarding  Musculoskeletal: right AKA - open area along lateral incision without any drainage, pink with some crusting - no further induration and softer, less TTP, less edema. LLE no edema or lesions  Skin: open area lateral right AKA incision without induration, +crusting, no drainage. No other open lesions or rashes  Neurologic:  Neurovascularly intact without any focal sensory/motor deficits.  Cranial nerves: II-XII intact, grossly non-focal.  Psychiatric: Alert and oriented, thought content appropriate, normal insight  Capillary Refill: Brisk,< 3 seconds Labs:   Recent Labs     06/07/19  0609   WBC 9.2   HGB 12.7*   HCT 36.8*        Recent Labs     06/07/19  0609      K 4.2      CO2 21*   BUN 13   CREATININE 0.7   CALCIUM 9.2     No results for input(s): AST, ALT, BILIDIR, BILITOT, ALKPHOS in the last 72 hours. No results for input(s): INR in the last 72 hours. No results for input(s): [place] in the last 72 hours. Urinalysis:      Lab Results   Component Value Date    NITRU NEGATIVE 06/06/2019    WBCUA 2-4 04/16/2019    BACTERIA NONE 04/16/2019    RBCUA 0-2 04/16/2019    BLOODU NEGATIVE 06/06/2019    SPECGRAV >1.030 06/06/2019    GLUCOSEU >= 1000 02/15/2019       Radiology:  CT FEMUR RIGHT WO CONTRAST   Final Result   Soft tissue swelling of the lateral aspect right thigh and the right stump, consistent with cellulitis. There is no evidence of bone destruction to suggest osteomyelitis. **This report has been created using voice recognition software. It may contain minor errors which are inherent in voice recognition technology. **      Final report electronically signed by Dr. Indra Hodges on 6/6/2019 9:54 AM          Diet: DIET CARB CONTROL;     Mitchell: no    Microbiology:  Blood cx 6/5 (-) to date    Tele:  SR, no arrhythmias    DVT prophylaxis: [x] Lovenox                                 [] SCDs                                 [] SQ Heparin                                 [] Encourage ambulation           [] Already on Anticoagulation     Disposition:    [x] Home       [] TCU       [] Rehab       [] Psych       [] SNF       [] [facility]       [] Other-    Code Status: Full Code    PT/OT Eval Status: not needed      Electronically signed by Jackie Castellon MD on 6/9/2019 at 10:39 AM when pt evaluated - final note filed late

## 2019-06-10 LAB
ANION GAP SERPL CALCULATED.3IONS-SCNC: 15 MEQ/L (ref 8–16)
BASOPHILS # BLD: 0.8 %
BASOPHILS ABSOLUTE: 0.1 THOU/MM3 (ref 0–0.1)
BUN BLDV-MCNC: 13 MG/DL (ref 7–22)
CALCIUM SERPL-MCNC: 9.6 MG/DL (ref 8.5–10.5)
CHLORIDE BLD-SCNC: 100 MEQ/L (ref 98–111)
CO2: 23 MEQ/L (ref 23–33)
CREAT SERPL-MCNC: 0.6 MG/DL (ref 0.4–1.2)
EOSINOPHIL # BLD: 5.1 %
EOSINOPHILS ABSOLUTE: 0.6 THOU/MM3 (ref 0–0.4)
ERYTHROCYTE [DISTWIDTH] IN BLOOD BY AUTOMATED COUNT: 14.7 % (ref 11.5–14.5)
ERYTHROCYTE [DISTWIDTH] IN BLOOD BY AUTOMATED COUNT: 44.7 FL (ref 35–45)
GFR SERPL CREATININE-BSD FRML MDRD: > 90 ML/MIN/1.73M2
GLUCOSE BLD-MCNC: 117 MG/DL (ref 70–108)
GLUCOSE BLD-MCNC: 133 MG/DL (ref 70–108)
GLUCOSE BLD-MCNC: 147 MG/DL (ref 70–108)
GLUCOSE BLD-MCNC: 180 MG/DL (ref 70–108)
GLUCOSE BLD-MCNC: 211 MG/DL (ref 70–108)
HCT VFR BLD CALC: 41.2 % (ref 42–52)
HEMOGLOBIN: 13.6 GM/DL (ref 14–18)
IMMATURE GRANS (ABS): 0.15 THOU/MM3 (ref 0–0.07)
IMMATURE GRANULOCYTES: 1.3 %
LYMPHOCYTES # BLD: 22.9 %
LYMPHOCYTES ABSOLUTE: 2.7 THOU/MM3 (ref 1–4.8)
MCH RBC QN AUTO: 27.8 PG (ref 26–33)
MCHC RBC AUTO-ENTMCNC: 33 GM/DL (ref 32.2–35.5)
MCV RBC AUTO: 84.1 FL (ref 80–94)
MONOCYTES # BLD: 7.8 %
MONOCYTES ABSOLUTE: 0.9 THOU/MM3 (ref 0.4–1.3)
NUCLEATED RED BLOOD CELLS: 0 /100 WBC
PLATELET # BLD: 308 THOU/MM3 (ref 130–400)
PMV BLD AUTO: 8.9 FL (ref 9.4–12.4)
POTASSIUM SERPL-SCNC: 4.1 MEQ/L (ref 3.5–5.2)
RBC # BLD: 4.9 MILL/MM3 (ref 4.7–6.1)
SEG NEUTROPHILS: 62.1 %
SEGMENTED NEUTROPHILS ABSOLUTE COUNT: 7.3 THOU/MM3 (ref 1.8–7.7)
SODIUM BLD-SCNC: 138 MEQ/L (ref 135–145)
WBC # BLD: 11.8 THOU/MM3 (ref 4.8–10.8)

## 2019-06-10 PROCEDURE — 80048 BASIC METABOLIC PNL TOTAL CA: CPT

## 2019-06-10 PROCEDURE — 6370000000 HC RX 637 (ALT 250 FOR IP): Performed by: INTERNAL MEDICINE

## 2019-06-10 PROCEDURE — 36415 COLL VENOUS BLD VENIPUNCTURE: CPT

## 2019-06-10 PROCEDURE — 99231 SBSQ HOSP IP/OBS SF/LOW 25: CPT | Performed by: NURSE PRACTITIONER

## 2019-06-10 PROCEDURE — 6370000000 HC RX 637 (ALT 250 FOR IP): Performed by: FAMILY MEDICINE

## 2019-06-10 PROCEDURE — 6360000002 HC RX W HCPCS: Performed by: INTERNAL MEDICINE

## 2019-06-10 PROCEDURE — 2580000003 HC RX 258: Performed by: INTERNAL MEDICINE

## 2019-06-10 PROCEDURE — 85025 COMPLETE CBC W/AUTO DIFF WBC: CPT

## 2019-06-10 PROCEDURE — 82948 REAGENT STRIP/BLOOD GLUCOSE: CPT

## 2019-06-10 PROCEDURE — 1200000003 HC TELEMETRY R&B

## 2019-06-10 RX ADMIN — INSULIN LISPRO 3 UNITS: 100 INJECTION, SOLUTION INTRAVENOUS; SUBCUTANEOUS at 20:59

## 2019-06-10 RX ADMIN — VANCOMYCIN HYDROCHLORIDE 1500 MG: 5 INJECTION, POWDER, LYOPHILIZED, FOR SOLUTION INTRAVENOUS at 15:12

## 2019-06-10 RX ADMIN — INSULIN LISPRO 18 UNITS: 100 INJECTION, SOLUTION INTRAVENOUS; SUBCUTANEOUS at 08:42

## 2019-06-10 RX ADMIN — VANCOMYCIN HYDROCHLORIDE 1500 MG: 5 INJECTION, POWDER, LYOPHILIZED, FOR SOLUTION INTRAVENOUS at 03:53

## 2019-06-10 RX ADMIN — INSULIN LISPRO 3 UNITS: 100 INJECTION, SOLUTION INTRAVENOUS; SUBCUTANEOUS at 16:37

## 2019-06-10 RX ADMIN — INSULIN LISPRO 18 UNITS: 100 INJECTION, SOLUTION INTRAVENOUS; SUBCUTANEOUS at 16:37

## 2019-06-10 RX ADMIN — CEFTRIAXONE SODIUM 1 G: 1 INJECTION, POWDER, FOR SOLUTION INTRAMUSCULAR; INTRAVENOUS at 03:20

## 2019-06-10 RX ADMIN — INSULIN GLARGINE 55 UNITS: 100 INJECTION, SOLUTION SUBCUTANEOUS at 08:41

## 2019-06-10 RX ADMIN — VITAMIN D, TAB 1000IU (100/BT) 5000 UNITS: 25 TAB at 08:41

## 2019-06-10 RX ADMIN — Medication 10 ML: at 08:41

## 2019-06-10 RX ADMIN — ENOXAPARIN SODIUM 40 MG: 40 INJECTION SUBCUTANEOUS at 08:41

## 2019-06-10 RX ADMIN — SERTRALINE 150 MG: 100 TABLET, FILM COATED ORAL at 08:41

## 2019-06-10 RX ADMIN — INSULIN LISPRO 18 UNITS: 100 INJECTION, SOLUTION INTRAVENOUS; SUBCUTANEOUS at 12:12

## 2019-06-10 RX ADMIN — INSULIN GLARGINE 55 UNITS: 100 INJECTION, SOLUTION SUBCUTANEOUS at 20:59

## 2019-06-10 ASSESSMENT — PAIN SCALES - GENERAL: PAINLEVEL_OUTOF10: 0

## 2019-06-10 NOTE — CARE COORDINATION
6/10/19, 11:01 AM      Denice Long day: 4  Location: [facility]22/022-A Reason for admit: Sepsis [facility]) [A41.9]       Procedure: na  Treatment Plan of Care: continue IV Vancomycin, ortho and ID following, IV Rocephin, accu checks as ordered with ISS, Lovenox, stump incision with scab and open to air, no drainage from rt stump. PCP: MARILYN Gregorio CNP       Readmission Risk Score: 44%      Discharge Plan: Spoke with Nicholas Goldstein; awaiting ortho and ID plan for discharge.

## 2019-06-10 NOTE — CARE COORDINATION
[address] Transitions Interview     6/10/2019    Patient: Yolanda Jerome Patient : 1970   MRN: [id number]  Reason for Admission: Sepsis ([facility] [address] [A41.9]  RARS: Readmission Risk Score: 44    Met with: Day Brody for inpatient Care Transition interview. Identified self/role. Explained CTC process, verbalized understanding and agreeable to one time CTC call before being transferred back to established [facility]. Readmission Risk  Patient Active Problem List   Diagnosis    Cellulitis    Status post below knee amputation of right lower extremity (HCC)    Gait disturbance    Sebaceous cyst    Uncontrolled type 2 diabetes mellitus with hyperglycemia, with long-term current use of insulin (HCC)    Severe bipolar II disorder, recent episode major depressive, remission (HCC)    Bipolar 1 disorder (HCC)    Leukocytosis    Lactic acidosis    Hyponatremia    Normocytic anemia    Obesity (BMI 30-39. 9)    History of noncompliance with medical treatment    Essential hypertension    Tobacco dependence    Gastroesophageal reflux disease without esophagitis    History of marijuana use    Physical debility    Anemia    Weakness    Infection of above knee amputation stump of right leg (HCC)    Above knee amputation of right lower extremity (HCC)    Sepsis ([facility] [address]    Vitamin D deficiency       Inpatient Assessment  Care Transitions Summary    Care Transitions Inpatient Review  Medication Review  Do you have all of your prescriptions and are they filled?:  Yes   Barriers to Medication Adherence:  None  Are you able to afford your medications?:  Yes  How often do you have difficulty taking your medications?:  I always take them as prescribed.   Housing Review  Who do you live with?:  Other Caregiver  Are you an active caregiver in your home?:  No  Social Support  Do you have a ?:  Yes   Name:  [address] [place] [address]   Contact Info:  [phone]  Do you have a Home Health Coordinator?:  No  Durable Medical Equipment  Patient DME:  Shower chair, Walker, Wheelchair, Other  Other Patient DME:  Entrance Ramp  Functional Review  Ability to seek help/take action for Emergent/Urgent situations i.e. fire, crime, inclement weather or health crisis.:  Needs Assistance  Ability handle personal hygiene needs (bathing/dressing/grooming): Independent  Ability to manage medications: Independent  Ability to prepare food:  Needs Assistance  Ability to maintain home (clean home, laundry):  Needs Assistance  Ability to drive and/or has transportation:  Dependent  Ability to do shopping:  Needs Assistance  Ability to manage finances: Independent  Is patient able to live independently?:  No  Hearing and Vision  Visual Impairment:  None  Hearing Impairment:  None  Care Transitions Interventions  No Identified Needs       Patient presented to the ED on 06/06/2019 with report of redness and tenderness to RLE. Patient had a BKA converted to AKA on 04/18/2019. Patient presented to ED on 06/05/2019, diagnosed with cellulitis, and prescribed Keflex and Bactrim. PMH includes DM, HTN, and GERD. Lab elevations in WBC, CRP, ESR, and lactic acid. Patient admitted for Sepsis ([facility] [address] [A41.9]. Consults with Infectious Disease and Orthopedics. Case management and social work following. [facility]. Patient states he is independent with ADL's and managing medications. Staff assist with cooking and cleaning. [facility] Patient denies additional needs or concerns at this time. Business card with contact information for CTC provided. Patient understands CTC will follow upon discharge. Agatha, patient's established ACC, updated on patient admission and POC.      Follow Up  Future Appointments   Date Time Provider Ebenezer Carmichael   6/27/2019 10:00 AM Da Ching, [address] [facility] Maintenance  There are no preventive care reminders to display for this patient.     Efrain Acosta RN  Care Transition Coordinator  (u) [phone] (x) [phone]

## 2019-06-10 NOTE — PLAN OF CARE
Evan Arzate RN  Outcome: Ongoing  Note:   Monitoring blood sugar AC and HS. Sliding scale insulin and lantus on MAR. Problem: DISCHARGE BARRIERS  Goal: Patient's continuum of care needs are met  Outcome: Ongoing     Care plan reviewed with patient. Patient verbalizes understanding of the plan of care and contribute to goal setting.

## 2019-06-10 NOTE — PROGRESS NOTES
Hospitalist Progress Note      Patient:  Thanh Cruz      Unit/Bed:6K-22/022-A    YOB: 1970    MRN: [id number]       Acct: [de-identified]     PCP: MARILYN Colbert CNP    Date of Admission: 6/5/2019    Assessment/Plan:    1. Sepsis secondary to right stump/leg cellulitis. + tachycardia, leukocytosis, lactic acidosis. CT no OM or abscess. ID and ortho following -- cont rocephin 6/6, vanc 6/5. Ortho seen today. No need for surgical I&D. Will follow up in 1-2 weeks. 2. Right stump AKA cellulitis, swelling and open wound -- per ID and ortho -- CT right stump 6/6/19 = soft tissue swelling lateral right thigh and stump c/w cellulitis. no bone destruction to suggest OM, no abscess. -- site broke open after hitting it on his WC per pt 6/7 --> NO culture able to be obtained at the time of when was draining -- prior admits 4/2019 with strep and in 4/2018 with strep and enterococcus  3. Lactic/metabolic acidosis -- due to sepsis - ?due to tissue damage from cellulitis  4. Hyponatremia -- due to hypovolemia, hyperglycemia -- improved from 129 on 6/5 and 6/6 to normal 135 6/7 --> s/p IVF and improvement of BS -- monitor - asx.  5. Type 2 DM, uncontrolled -- adjusted pt's insulin to home regimen 6/7 of lantus 55 units bid, 18 units with meals +high SSI -- cont monitor and adjust prn - last A1C 4/2 = 9.1   6. Hx Essential HTN -- no meds prior to admission -- BP controlled and stable  7. Normocytic anemia -- chronic -- 12.7 on 6/7 --> down from 13.6 on admission 6/5 -- has been 10 to 13 since 2/2019 -- monitor with above  8. Diastolic dysfunction -- per echo from [facility] 10/2018 -- EF 50-55%, no valve abn -- monitor fluid status  9. Bipolar d/o -- zoloft -- mood stable  10. Hx multiple foot wounds and OM due to DM and s/p right AKA - per ID and ortho  11. Vitamin d deficiency -- last 4/29/19 low at 16 --> cont po supplement  12. Obesity Body mass index is 35.2 kg/m².    13. Tobacco abuse -- Anticoagulation     Disposition:    [x] Home       [] TCU       [] Rehab       [] Psych       [] SNF       [] [facility]       [] Other-    Code Status: Full Code    PT/OT Eval Status: not needed      Electronically signed by MARILYN Peck CNP on 6/10/2019 at 2:54 PM

## 2019-06-11 ENCOUNTER — TELEPHONE (OUTPATIENT)
Dept: INTERNAL MEDICINE CLINIC | Age: 49
End: 2019-06-11

## 2019-06-11 VITALS
WEIGHT: 218.1 LBS | HEART RATE: 86 BPM | BODY MASS INDEX: 35.05 KG/M2 | TEMPERATURE: 98.3 F | RESPIRATION RATE: 16 BRPM | HEIGHT: 66 IN | OXYGEN SATURATION: 100 % | SYSTOLIC BLOOD PRESSURE: 135 MMHG | DIASTOLIC BLOOD PRESSURE: 68 MMHG

## 2019-06-11 LAB
BLOOD CULTURE, ROUTINE: NORMAL
BLOOD CULTURE, ROUTINE: NORMAL
GLUCOSE BLD-MCNC: 142 MG/DL (ref 70–108)
GLUCOSE BLD-MCNC: 164 MG/DL (ref 70–108)
GLUCOSE BLD-MCNC: 174 MG/DL (ref 70–108)

## 2019-06-11 PROCEDURE — 6370000000 HC RX 637 (ALT 250 FOR IP): Performed by: INTERNAL MEDICINE

## 2019-06-11 PROCEDURE — 2580000003 HC RX 258: Performed by: INTERNAL MEDICINE

## 2019-06-11 PROCEDURE — 6370000000 HC RX 637 (ALT 250 FOR IP): Performed by: FAMILY MEDICINE

## 2019-06-11 PROCEDURE — 6360000002 HC RX W HCPCS: Performed by: INTERNAL MEDICINE

## 2019-06-11 PROCEDURE — 82948 REAGENT STRIP/BLOOD GLUCOSE: CPT

## 2019-06-11 PROCEDURE — 99239 HOSP IP/OBS DSCHRG MGMT >30: CPT | Performed by: NURSE PRACTITIONER

## 2019-06-11 RX ORDER — HYDROCODONE BITARTRATE AND ACETAMINOPHEN 5; 325 MG/1; MG/1
1 TABLET ORAL EVERY 8 HOURS PRN
Qty: 10 TABLET | Refills: 0 | Status: SHIPPED | OUTPATIENT
Start: 2019-06-11 | End: 2019-06-18

## 2019-06-11 RX ADMIN — VITAMIN D, TAB 1000IU (100/BT) 5000 UNITS: 25 TAB at 07:47

## 2019-06-11 RX ADMIN — INSULIN LISPRO 3 UNITS: 100 INJECTION, SOLUTION INTRAVENOUS; SUBCUTANEOUS at 07:46

## 2019-06-11 RX ADMIN — INSULIN LISPRO 18 UNITS: 100 INJECTION, SOLUTION INTRAVENOUS; SUBCUTANEOUS at 12:15

## 2019-06-11 RX ADMIN — INSULIN GLARGINE 55 UNITS: 100 INJECTION, SOLUTION SUBCUTANEOUS at 08:03

## 2019-06-11 RX ADMIN — SERTRALINE 150 MG: 100 TABLET, FILM COATED ORAL at 07:47

## 2019-06-11 RX ADMIN — INSULIN LISPRO 18 UNITS: 100 INJECTION, SOLUTION INTRAVENOUS; SUBCUTANEOUS at 07:47

## 2019-06-11 RX ADMIN — CEFTRIAXONE SODIUM 1 G: 1 INJECTION, POWDER, FOR SOLUTION INTRAMUSCULAR; INTRAVENOUS at 06:10

## 2019-06-11 RX ADMIN — ENOXAPARIN SODIUM 40 MG: 40 INJECTION SUBCUTANEOUS at 07:47

## 2019-06-11 RX ADMIN — VANCOMYCIN HYDROCHLORIDE 1500 MG: 5 INJECTION, POWDER, LYOPHILIZED, FOR SOLUTION INTRAVENOUS at 06:10

## 2019-06-11 ASSESSMENT — PAIN SCALES - GENERAL
PAINLEVEL_OUTOF10: 0
PAINLEVEL_OUTOF10: 0

## 2019-06-11 NOTE — PROGRESS NOTES
Discharge teaching and instructions for diagnosis/procedure of sepsis completed with patient using teachback method. AVS reviewed. Printed prescriptions given to patient. Patient voiced understanding regarding prescriptions, follow up appointments, and care of self at home.  Discharged in a wheelchair to  home with support per EMS transportation

## 2019-06-11 NOTE — PROGRESS NOTES
Progress note: Infectious diseases    Patient - Tanmay Cowart,  Age - 44 y.o.    - 1970      Room Number - 6K-21/200-A   MRN -  [id number]   Acct # - [de-identified]  Date of Admission -  2019 10:09 PM    SUBJECTIVE:   He has no new complaints  The wound spontaneously drained prulent material  There is no redness  No fever  OBJECTIVE   VITALS    height is 5' 6\" (1.676 m) and weight is 218 lb 1.6 oz (98.9 kg). His oral temperature is 98.3 °F (36.8 °C). His blood pressure is 135/68 and his pulse is 86. His respiration is 16 and oxygen saturation is 100%.        Wt Readings from Last 3 Encounters:   06/10/19 218 lb 1.6 oz (98.9 kg)   19 175 lb (79.4 kg)   19 215 lb (97.5 kg)       I/O (24 Hours)    Intake/Output Summary (Last 24 hours) at 2019 1235  Last data filed at 2019 0610  Gross per 24 hour   Intake 2355.88 ml   Output 3050 ml   Net -694.12 ml       General Appearance  Awake, alert, oriented,  not  In acute distress  HEENT - normocephalic, atraumatic, pink conjunctiva,  anicteric sclera  Neck - Supple, no mass  Lungs -  Bilateral good air entry, no rhonchi, no wheeze  Cardiovascular - Heart sounds are normal.     Abdomen - soft, not distended, nontender,   Neurologic - oriented  Skin - No bruising or bleeding  Extremities - right AKA, stump is scabbed, no drainage or redness    MEDICATIONS:      insulin glargine  55 Units Subcutaneous BID    vancomycin  1,500 mg Intravenous Q12H    sertraline  150 mg Oral Daily    vitamin D  5,000 Units Oral Daily    cefTRIAXone (ROCEPHIN) IV  1 g Intravenous Q24H    sodium chloride flush  10 mL Intravenous 2 times per day    enoxaparin  40 mg Subcutaneous Daily    vancomycin (VANCOCIN) intermittent dosing (placeholder)   Other RX Placeholder    insulin lispro  0-18 Units Subcutaneous TID WC    insulin lispro  0-9 Units Subcutaneous Nightly    insulin

## 2019-06-11 NOTE — PLAN OF CARE
Problem: Pain:  Goal: Pain level will decrease  Description  Pain level will decrease  Outcome: Ongoing  Note:   Patient pain 0/10. Pain goal is 4/10. Goal: Control of acute pain  Description  Control of acute pain  Outcome: Ongoing  Note:   Patient does not complain of pain this shift. Will continue to monitor. Goal: Control of chronic pain  Description  Control of chronic pain  Outcome: Ongoing  Note:   Patient has no complaints of pain, will continue to monitor. Problem: Infection:  Goal: Will remain free from infection  Description  Will remain free from infection  Outcome: Ongoing  Note:   Patient being treated for sepsis, IV antibiotics-see MAR. No redness or warmth noted. Problem: Skin Integrity:  Goal: Skin integrity will stabilize  Description  Skin integrity will stabilize  Outcome: Ongoing  Note:   No new signs or symptoms of skin breakdown noted this shift, encouraging patient to turn and reposition self in bed q2h       Problem: Discharge Planning:  Goal: Patients continuum of care needs are met  Description  Patients continuum of care needs are met  Outcome: Ongoing  Note:   Patient able to voice needs appropriately and is call light appropriate. Problem: Falls - Risk of:  Goal: Will remain free from falls  Description  Will remain free from falls  Outcome: Ongoing  Note:   No falls noted this shift. Continue falling star program. Bed alarm on, bed in low position. Call light and personal belongings in reach. Patient uses call light appropriately. Goal: Absence of physical injury  Description  Absence of physical injury  Outcome: Ongoing  Note:   No self harm noted. Safe environment provided, hourly rounding. Will continue to assess.         Problem: Serum Glucose Level - Abnormal:  Goal: Ability to maintain appropriate glucose levels will improve  Description  Ability to maintain appropriate glucose levels will improve  Outcome: Ongoing  Note:   BG being checked ACHS, sliding scale and lantus given when appropriate. Problem: DISCHARGE BARRIERS  Goal: Patient's continuum of care needs are met  Outcome: Ongoing  Note:   Patient able to voice needs appropriately and is call light appropriate. Care plan reviewed with patient. Patient verbalize understanding of the plan of care and contribute to goal setting.

## 2019-06-11 NOTE — DISCHARGE INSTR - DIET
foods. Cutting back on your intake of high fat food can help reduce body weight and cholesterol levels. Reduce intake of fried food, ohara, sausage, luncheon meat, gravy, sour cream, cheese, egg yolks and margarine/butter. Limit your intake of alcohol. Drink alcohol only with permission of your doctor. Never drink alcohol on an empty stomach. Be more active. Regular exercise is an important part of your diabetes care as exercise can help lower your blood sugar levels. The type and amount of exercise that is right for you should be discussed with your doctor.

## 2019-06-11 NOTE — CARE COORDINATION
6/11/19, 3:07 PM    Discharge plan discussed by  and . Discharge plan reviewed with patient/ family. Patient/ family verbalize understanding of discharge plan and are in agreement with plan. Understanding was demonstrated using the teach back method. IMM Letter  IMM Letter given to Patient/Family/Significant other/Guardian/POA/by[de-identified] LEO Umaña  IMM Letter date given[de-identified] 06/11/19  IMM Letter time given[de-identified] 4430     Discharge back to [facility]. LACP [place] for transportation, RN [employer]. Pt denied any additional needs.

## 2019-06-11 NOTE — CARE COORDINATION
06/11/19  2:20 PM  Clinical update: Hospitalist and ID following. Planning home on PO atb. Spoke with patient, he does not feel HH is needed. Potential discharge later today [facility].

## 2019-06-11 NOTE — PROGRESS NOTES
CLINICAL PHARMACY: DISCHARGE MED RECONCILIATION/REVIEW    [facility]) Select Patient?: Yes  Total # of Interventions Recommended: 0   -   Total # Interventions Accepted: 0  Intervention Severity:   - Level 1 Intervention Present?: No   - Level 2 #: 0   - Level 3 #: 0   Time Spent (min): 5    Additional Documentation:  AVS reviewed for discharge.

## 2019-06-11 NOTE — DISCHARGE SUMMARY
Hospital Medicine Discharge Summary      Patient Identification:   Victoriano Woodruff   : 1970  MRN: [id number]   Account: [de-identified]      Patient's PCP: MARILYN Guerra CNP    Admit Date: 2019     Discharge Date:   2019    Admitting Physician: Jeri Reeves DO     Discharge Physician: Agatha Vasquez, MARILYN - CNP     Discharge Diagnoses:    1. Sepsis secondary to right stump/leg cellulitis. + tachycardia, leukocytosis, lactic acidosis. CT no OM or abscess. ID and ortho seen. Treated with IV rocephin and Vanc . Ortho seen no need for surgical I&D. Will follow up in 1-2 weeks in the OP clinic. Ok to discharge from ID standpoint. Home back on Keflex and Bactrim. 3. Right stump AKA cellulitis, swelling and open wound. CT right stump 19 = soft tissue swelling lateral right thigh and stump c/w cellulitis. no bone destruction to suggest OM, no abscess. Site broke open after hitting it on his WC per pt  .  4. Lactic/metabolic acidosis, resolved. 5. Hyponatremia, resolved. 6. Type 2 DM, uncontrolled. Back to home basal bolus regimen on discharge. 7. Hx Essential HTN -- no meds prior to admission -- BP controlled and stable  8. Normocytic anemia -- chronic. Hbg remained stable. 9. Diastolic dysfunction -- per echo from [facility] 10/2018 -- EF 50-55%, no valve abn. No signs of acute decompensation. 10. Bipolar d/o -- zoloft -- mood stable  11. Hx multiple foot wounds and OM due to DM and s/p right AKA. 12. Vitamin d deficiency -- last 19 low at 16 --> cont po supplement  13. Obesity Body mass index is 35.3 kg/m². 14. Tobacco abuse -- counseled. 15. Hx marijuana abuse -- UDS (-) since 2018 at least - none done this admission  16. Hx non-compliance. The patient was seen and examined on day of discharge and this discharge summary is in conjunction with any daily progress note from day of discharge.     Hospital Course:         Victoriano Woodruff is a 44 y.o. male with DM2, bipolar d/o, HTN, GERD, hx right AKA for wound infections from DM presenting with redness, tenderness and warmth to the surgical site ×2 days. Patient presented to the emergency department day prior to admission and was treated for cellulitis. Danis Hidalgo was discharged home with a prescription of Keflex and Bactrim.  Parameter of the infection was marked with border drawn.  Patient was advised to return to the emergency department with increased redness or pain to the site.  Patient states that he's had increasing symptoms including subjective fevers and came into the ED for evaluation and treatment.     In ER, CBC with noted leukocytosis, NICK 20,676, hemoglobin 13.6, platelets 269,658.  BMP notes mild hyponatremia with a sodium of 129.  BUN is 12 and serum creatinine 1.0.  Glucose 472.  Lactic acid 3.3.  Patient to be admitted for sepsis due to underlying infection to the surgical site of right AKA.     ID Dr. Miguelina Bonilla consulted - cont on IV rocephin and vancomycin;  Ortho Dr. Lulu Barrios consulted (knows pt well) for possible surgical I&D. No surgical procedure was felt to be needed after several days of close monitoring. This decision was made on 6 10. Pt \"bumped\" the lateral stumparea on his WC on 6/7 and it \"burst open\" and per pt drained a lot of purulent material and serosanguinous material and since then site has greatly improved in redness, edema, pain. ID seen today, day of discharge. Ok to go home with Keflex and Bactrim previously prescribed prior to admission.          Exam:     Vitals:  Vitals:    06/11/19 0005 06/11/19 0300 06/11/19 0730 06/11/19 1105   BP: (!) 115/58 (!) 112/59 (!) 132/92 135/68   Pulse: 89 82 83 86   Resp: 16 16 17 16   Temp: 97.5 °F (36.4 °C) 97.7 °F (36.5 °C) 97.9 °F (36.6 °C) 98.3 °F (36.8 °C)   TempSrc: Oral Oral Oral Oral   SpO2: 94% 95% 98% 100%   Weight:       Height:         Weight: Weight: 218 lb 1.6 oz (98.9 kg)     24 hour intake/output:    Intake/Output Summary (Last 24 hours) at 6/11/2019 1457  Last data filed at 6/11/2019 1320  Gross per 24 hour   Intake 1608.7 ml   Output 2850 ml   Net -1241.3 ml            General appearance: No apparent distress, appears older than stated age and cooperative. Up in his WC. HEENT: Pupils equal, round, and reactive to light. Conjunctivae/corneas clear. Neck: Supple, with full range of motion. No jugular venous distention. Trachea midline. Respiratory:  Normal respiratory effort. Clear to auscultation, bilaterally without Rales/Wheezes/Rhonchi. No respiratory distress or accessory muscle use. Cardiovascular: Regular rate and rhythm with normal S1/S2 without murmurs, rubs or gallops. Abdomen: Soft, non-tender, non-distended with normal bowel sounds. No rebound or guarding  Musculoskeletal: right AKA - open area along lateral incision without any drainage, pink with some crusting - no further induration and softer, less TTP, less edema. LLE no edema or lesions  Skin: open area lateral right AKA incision without induration, +crusting, no drainage. No other open lesions or rashes  Neurologic:  Neurovascularly intact without any focal sensory/motor deficits. Cranial nerves: II-XII intact, grossly non-focal.  Psychiatric: Alert and oriented, thought content appropriate, normal insight  Capillary Refill: Brisk,< 3 seconds          Labs:  For convenience and continuity at follow-up the following most recent labs are provided:      CBC:    Lab Results   Component Value Date    WBC 11.8 06/10/2019    HGB 13.6 06/10/2019    HCT 41.2 06/10/2019     06/10/2019       Renal:    Lab Results   Component Value Date     06/10/2019    K 4.1 06/10/2019    K 4.2 04/03/2019     06/10/2019    CO2 23 06/10/2019    BUN 13 06/10/2019    CREATININE 0.6 06/10/2019    CALCIUM 9.6 06/10/2019    PHOS 2.6 02/16/2019         Significant Diagnostic Studies    Radiology:   CT FEMUR RIGHT WO CONTRAST   Final Result   Soft tissue swelling of the lateral aspect right thigh and the right stump, consistent with cellulitis. There is no evidence of bone destruction to suggest osteomyelitis. **This report has been created using voice recognition software. It may contain minor errors which are inherent in voice recognition technology. **      Final report electronically signed by Dr. Adriana Vaughan on 6/6/2019 9:54 AM             Consults:     IP CONSULT TO PHARMACY  IP CONSULT TO INFECTIOUS DISEASES  IP CONSULT TO ORTHOPEDIC SURGERY  IP CONSULT TO SOCIAL WORK    Disposition: Home  Condition at Discharge: Stable    Code Status:  Full Code     Patient Instructions: Activity: activity as tolerated  Diet: DIET CARB CONTROL; Follow-up visits:   Cathi Duong MD  [facility] [address]    On 6/26/2019  [phone]    10:30am     Nai John, [address] [address]  [phone]    On 6/19/2019  12:50pm     [address] [place] [address]  [phone]    On 6/14/2019  9:00am          Discharge Medications:      Yaa Rdz   Home Medication Instructions [id number]    Printed on:06/11/19 1080   Medication Information                      blood glucose monitor kit and supplies  Use to test blood sugars DX:E11.65             blood glucose monitor strips  Accucheck Sheila Test Strips Test blood sugars 4 times daily DX:E11.65             cephALEXin (KEFLEX) 500 MG capsule  Take 1 capsule by mouth 4 times daily             HYDROcodone-acetaminophen (NORCO) 5-325 MG per tablet  Take 1 tablet by mouth every 8 hours as needed for Pain for up to 7 days. Intended supply: 7 days.  Take lowest dose possible to manage pain             insulin aspart (NOVOLOG) 100 UNIT/ML injection vial  Inject 20 Units into the skin 3 times daily (before meals)             insulin glargine (LANTUS SOLOSTAR) 100 UNIT/ML injection pen  Inject 55 Units into the skin 2 times daily             Insulin Syringe-Needle U-100 29G X 1/2\" 0.3 ML MISC  1 each by Does not apply route 4 times daily (after meals and at bedtime)             Lancets MISC  Use to test blood sugars 4 times daily DX:E11.65             sertraline (ZOLOFT) 100 MG tablet  Take 1.5 tablets by mouth daily             sulfamethoxazole-trimethoprim (BACTRIM DS) 800-160 MG per tablet  Take 1 tablet by mouth 2 times daily for 10 days             vitamin D-3 (CHOLECALCIFEROL) 5000 units TABS  Take 1 tablet by mouth daily                 Time Spent on discharge is more than 35 minutes in the examination, evaluation, counseling and review of medications and discharge plan. Signed: Thank you MARILYN Deng CNP for the opportunity to be involved in this patient's care.     Electronically signed by MARILYN Talley CNP on 6/11/2019 at 2:57 PM

## 2019-06-12 ENCOUNTER — TELEPHONE (OUTPATIENT)
Dept: INTERNAL MEDICINE CLINIC | Age: 49
End: 2019-06-12

## 2019-06-12 ENCOUNTER — CARE COORDINATION (OUTPATIENT)
Dept: CASE MANAGEMENT | Age: 49
End: 2019-06-12

## 2019-06-12 NOTE — TELEPHONE ENCOUNTER
Have the medications prescribed at discharge been filled? yes  Does the patient understand what the medications are for? yes  Has the patient experienced any new symptoms or have previous symptoms worsened? no  Is the patient experiencing any pain? no If yes, is the pain well controlled? n/a  We want to be sure the patient has the best recovery possible. Does the patient understand all the discharge instructions? yes  Did someone talk to the patient and/or family about the patient needs prior to being discharged?  yes  Did the patient's doctor communicate well? yes  Did the patient's nurse communicate well? yes  Was the patient satisfied with the services and care received at [address] [place] [address]? yes      [facility] 45 Transitions Initial Follow Up Call    Call within 2 business days of discharge: Yes     Patient: Thanh Cruz Patient : 1970   MRN: [id number]  Reason for Admission: sepsis  Discharge Date: 19 RARS: Readmission Risk Score: 45       Spoke with: patient    Discharge department/facility: [facility]    Non-face-to-face services provided:  Scheduled appointment with PCP-Alejandra Anguiano    Follow Up  Future Appointments   Date Time Provider Ebenezer Carmichael   2019  9:00 AM MARILYN Colbert CNP [facility] Physic [facility] [facility]   2019 10:30 AM [facility] None   2019 10:00 AM MARILYN Colbert CNP SRPX Physic [address] [facility]

## 2019-06-12 NOTE — PROGRESS NOTES
CLINICAL PHARMACY NOTE: MEDS TO [address] Select Patient?: Yes  Total # of Prescriptions Filled: 1   The following medications were delivered to the patient:    Total # of Interventions Completed: 4  Time Spent (min): 60    Additional Documentation:

## 2019-06-12 NOTE — CARE COORDINATION
Lanie 45 Transitions Initial Follow Up Call    Call within 2 business days of discharge: Yes    Patient: Miguel Isaac Patient : 1970   MRN: [id number]  Reason for Admission: Cellulitis of right lower extremity  Discharge Date: 19 RARS: Readmission Risk Score: 45      Last Discharge [facility]       Complaint Diagnosis Description Type Department Provider    19 Wound Infection Cellulitis of right lower extremity . .. ED to Hosp-Admission (Discharged) (ADMITTED) BLANK TapiaK ARTHUR Morin. .. Called pt for the care transition initial follow up call. Left message to call transition care coordinator from [facility] @ [phone].     Follow Up  Future Appointments   Date Time Provider Ebenezer Carmichael   2019  9:00 AM MARILYN Hernandez CNP Physic [facility] [place]   2019 10:30 AM MD BLANK Dash WOUND None   2019 10:00 AM MARILYN Hernandez CNP Physic [facility] - [place] Phillips RN  Care Transition Coordinator  [phone]

## 2019-06-13 ENCOUNTER — CARE COORDINATION (OUTPATIENT)
Dept: CASE MANAGEMENT | Age: 49
End: 2019-06-13

## 2019-06-14 ENCOUNTER — CARE COORDINATION (OUTPATIENT)
Dept: CASE MANAGEMENT | Age: 49
End: 2019-06-14

## 2019-06-14 NOTE — CARE COORDINATION
[facility] 45 Transitions Initial Follow Up Call    Call within 2 business days of discharge: Yes    Patient: Wendi Dickey Patient : 1970   MRN: [id number]  Reason for Admission: Cellulitis of right lower extremity   Discharge Date: 19 RARS: Readmission Risk Score: 45      Last Discharge [facility]       Complaint Diagnosis Description Type Department Provider    19 Wound Infection Cellulitis of right lower extremity . .. ED to Hosp-Admission (Discharged) (ADMITTED) BLANK Kumar DO; Angus Prater. .. Third and final attempt to contact patient for initial Care Transition follow up. Message left for patient to return call. Contact information for CTC provided. Encounter routed to patient's established ACC to resume care.      Facility: [address] [place] [address] Up  Future Appointments   Date Time Provider Ebenezer Carmichael   2019 10:30 AM MD BLANK Malin WOUND None   2019 10:00 AM Chun Lee, APRN - CNP [facility] Physic [facility] RN  Care Transition Coordinator  (X) [phone]  21

## 2019-06-14 NOTE — PLAN OF CARE
Documentation for Prosthesis. Victoriano Woodruff, 1970    Diagnosis: Above knee am[facility] of right lower extremity [facility] [Z89.611]    1. Patient has expressed a desire to ambulate with a prosthesis and is regularly calling his prosthetist to proceed with the new DME. 2. Current K Level is K3.  3. His Pavet score indicates he is a good candidate and should be a K3 AK Prostheic user as well. NOTE: If current and expected K-levels differ, the reason for the difference must be explained). 4. This physician is in agreement of the proposed prosthetic services. Please see attached paperwork. Patient has a history of doing well and benefiting from a prior below knee  Prosthesis. 5. Are there any comorbid conditions that will affect ambulation or the patients ability to use the prosthesis? Explain. Impaired wound healing secondary to diabetes mellitus. Being addressed by visits with wound clinic appointments. 1. Overview of the medical history:    Victoriano Woodruff is a 44 y.o. male admitted to inpatient rehabilitation on 4/22/2019.   Original admission 4/18/2019 for for complaint of pain secondary to a wound in his prior below knee amputation. Gisele Spurling originally had a RIGHT below-knee amputation for persistent osteomyelitis in his RIGHT foot on 7/20/2016 and subsequently developed a poorly healing wound and osteomyelitis with Streptococcus agalactiae.  On 4/4/2019 he underwent incision and drainage with saucerization of the RIGHT tibia and fibula by Dr. Leslie Batista and a wound VAC was placed and he was discharged to home with home health with IV Rocephin.  He had low-grade temperatures and leukocytosis noted at home by the home health nurse and he was referred to come back into the emergency department for evaluation.  On 4/18/2019 he was agreeable to having a RIGHT above-knee amputation by Dr. Leslie Batista.  The patient is a poorly controlled type II diabetic and has not had controlled hemoglobin A1c values at any time from 2015 to 2019.     The patient was discharged from acute inpatient rehabilitation with stable vital signs with exception of mild asymptomatic hypertension; pain was well controlled at time of discharge. Medical course on the inpatient rehabilitation unit was notable for hyperglycemia secondary to poorly controlled diabetes, postoperative anemia, less than optimal pain control, lower limb edema, and a severely low vitamin D level. For the hypoglycemia endocrinology was consulted to help with adjustment of the patient's insulin regimen as he had freqeant episodes of hypoglycemia on his home insulin regimen. Postoperative anemia was mildly improved without use of supplemental oral iron from 10.5 to 10.9. To improve pain control several items were addressed, including slightly increasing the Percocet dosing, addressing the lower limb edema of the residual limb which appeared to be contributing to his pain over the superior portion of the surgical incision, and lastly contacting the orthopedic surgery service to obtain timing on when Ace wrapping could be initiated for the residual limb; at least 3 items his pain was improved by time of discharge. In regards to the lower limb edema he was placed on a daily dose of 20 mg of Lasix and 20 mEq of potassium with very good results in reducing the dependent edema both in the residual limb and the intact left leg by time of discharge. For the severely low vitamin D level of 9 cholecalciferol was started at a dosing of 5000 international units twice a day with improvement to a level of 16 over just 6 days. He was discharged home with a prescription to continue the 5000 international unit dosing for one month. The patient progressed well with the offered therapies and was discharged to home with a Physical Therapy and Occupational Therapy provided HEP. NOTE: Per CMS, the detailed written order (DWO) is not considered to be part of the medical record.      Myles Hilliard MD, FAAPMR, MS, [facility]

## 2019-06-18 ENCOUNTER — CARE COORDINATION (OUTPATIENT)
Dept: CARE COORDINATION | Age: 49
End: 2019-06-18

## 2019-06-28 ENCOUNTER — CARE COORDINATION (OUTPATIENT)
Dept: CARE COORDINATION | Age: 49
End: 2019-06-28

## 2019-07-01 ENCOUNTER — TELEPHONE (OUTPATIENT)
Dept: WOUND CARE | Age: 49
End: 2019-07-01

## 2019-07-09 ENCOUNTER — TELEPHONE (OUTPATIENT)
Dept: HYPERBARIC MEDICINE | Age: 49
End: 2019-07-09

## 2019-07-10 ENCOUNTER — CARE COORDINATION (OUTPATIENT)
Dept: CARE COORDINATION | Age: 49
End: 2019-07-10

## 2019-07-10 NOTE — CARE COORDINATION
Ambulatory Care Coordination Note  7/10/2019  CM Risk Score: 5  Charlson 10 Year Mortality Risk Score: 10%     ACC: Berenice Gregg, RN    Summary Note: Shayy Whitfield is being followed by Care Coordination for education and assistance in managing his DM. Recent AKA amputation. Had recent hospitalization for cellulitis of stump and sepsis in June. Reports that he has been doing ok since d/c. Unfortunately pt either cancelled or no showed to all f/u appts. Discussed importance of keeping f/u appts or calling to reschedule if needing to change appt date and times. Pt verbalizes understanding. States that he did not feel well which is why he didn't go to appts. Was to have f/u with ortho on 6/19. Did not go. Pt states \"I don't see any reason to go. Everything is healed. I don't have any redness. \"  Stressed need to reschedule regardless as f/u is important. Verbalizes understanding. Has rescheduled wound clinic appt for 7/17. Pt again states \"I don't know why I need to go to this appt. I don't have any open wounds. \"  Encouraged to keep appt for evaluation d/t recent hospitalization for sepsis/cellulitis. Pt no showed for hospital f/u with PCP. Attempted to reschedule f/u appt this week. Pt declines. States \"I'm too busy this week. It will have to be next week. \"  appt scheduled for 7/15. Pt very short and brief on phone today. Mentioned need to get some oupt therapy for assistance with his prosthesis. Contacted Severo with [facility]. Informed me that pt cancelled his last appt with them. Stressed need that pt needs to reschedule so if adjustments need to be made with prosthesis they can be done. States that he did recommend pt get outpt therapy for gait training with new prosthesis. Contacted Jamie mansfield. Informed him that he needs to reschedule appt with  ortho. Pt states \"I had planned on doing that today. \"  Discussed oupt therapy.   Pt states that he wants to do therapy at [facility]. supplies Use to test blood sugars DX:E11.65 4/30/19   Donny Benitez MD   Lancets MISC Use to test blood sugars 4 times daily DX:E11.65 4/30/19   Donny Benitez MD   Insulin Syringe-Needle U-100 29G X 1/2\" 0.3 ML MISC 1 each by Does not apply route 4 times daily (after meals and at bedtime) 4/30/19   Donny Benitez MD   vitamin D-3 (CHOLECALCIFEROL) 5000 units TABS Take 1 tablet by mouth daily 4/30/19   Donny Benitez MD   blood glucose monitor strips Accucheck Sheial Test Strips Test blood sugars 4 times daily DX:E11.65 10/23/18   MARILYN Falk CNP       Future Appointments   Date Time Provider Department Center   7/15/2019 11:20 AM MARILYN Garrido CNP [facility] Physic [facility]   7/17/2019  9:15 AM Jayleen Wisdom MD [facility] WOUND None

## 2019-07-15 ENCOUNTER — HOSPITAL ENCOUNTER (EMERGENCY)
Age: 49
Discharge: HOME | End: 2019-07-15
Attending: EMERGENCY MEDICINE
Payer: MEDICARE

## 2019-07-15 ENCOUNTER — OFFICE VISIT (OUTPATIENT)
Dept: INTERNAL MEDICINE CLINIC | Age: 49
End: 2019-07-15
Payer: MEDICARE

## 2019-07-15 VITALS
RESPIRATION RATE: 16 BRPM | SYSTOLIC BLOOD PRESSURE: 127 MMHG | HEIGHT: 66 IN | BODY MASS INDEX: 30.37 KG/M2 | WEIGHT: 189 LBS | OXYGEN SATURATION: 97 % | DIASTOLIC BLOOD PRESSURE: 81 MMHG | HEART RATE: 94 BPM | TEMPERATURE: 98.6 F

## 2019-07-15 VITALS
HEART RATE: 110 BPM | WEIGHT: 190 LBS | BODY MASS INDEX: 30.53 KG/M2 | DIASTOLIC BLOOD PRESSURE: 76 MMHG | HEIGHT: 66 IN | RESPIRATION RATE: 18 BRPM | SYSTOLIC BLOOD PRESSURE: 113 MMHG

## 2019-07-15 DIAGNOSIS — Z79.4 UNCONTROLLED TYPE 2 DIABETES MELLITUS WITH HYPERGLYCEMIA, WITH LONG-TERM CURRENT USE OF INSULIN (HCC): Primary | ICD-10-CM

## 2019-07-15 DIAGNOSIS — S78.111A ABOVE KNEE AMPUTATION OF RIGHT LOWER EXTREMITY (HCC): ICD-10-CM

## 2019-07-15 DIAGNOSIS — E11.65 UNCONTROLLED TYPE 2 DIABETES MELLITUS WITH HYPERGLYCEMIA, WITH LONG-TERM CURRENT USE OF INSULIN (HCC): Primary | ICD-10-CM

## 2019-07-15 DIAGNOSIS — E11.65 TYPE 2 DIABETES MELLITUS WITH HYPERGLYCEMIA, WITH LONG-TERM CURRENT USE OF INSULIN (HCC): Primary | ICD-10-CM

## 2019-07-15 DIAGNOSIS — Z79.4 TYPE 2 DIABETES MELLITUS WITH HYPERGLYCEMIA, WITH LONG-TERM CURRENT USE OF INSULIN (HCC): Primary | ICD-10-CM

## 2019-07-15 DIAGNOSIS — R26.81 GAIT INSTABILITY: ICD-10-CM

## 2019-07-15 LAB
ALBUMIN SERPL-MCNC: 3.8 G/DL (ref 3.5–5.1)
ALP BLD-CCNC: 87 U/L (ref 38–126)
ALT SERPL-CCNC: 16 U/L (ref 11–66)
ANION GAP SERPL CALCULATED.3IONS-SCNC: 14 MEQ/L (ref 8–16)
AST SERPL-CCNC: 19 U/L (ref 5–40)
AVERAGE GLUCOSE: 258 MG/DL (ref 70–126)
BASOPHILS # BLD: 0.7 %
BASOPHILS ABSOLUTE: 0.1 THOU/MM3 (ref 0–0.1)
BETA-HYDROXYBUTYRATE: 1.14 MG/DL (ref 0.2–2.81)
BILIRUB SERPL-MCNC: 0.3 MG/DL (ref 0.3–1.2)
BILIRUBIN DIRECT: < 0.2 MG/DL (ref 0–0.3)
BUN BLDV-MCNC: 15 MG/DL (ref 7–22)
CALCIUM SERPL-MCNC: 9.7 MG/DL (ref 8.5–10.5)
CHLORIDE BLD-SCNC: 92 MEQ/L (ref 98–111)
CHP ED QC CHECK: ABNORMAL
CHP ED QC CHECK: NORMAL
CO2: 26 MEQ/L (ref 23–33)
CREAT SERPL-MCNC: 0.9 MG/DL (ref 0.4–1.2)
EKG ATRIAL RATE: 98 BPM
EKG P AXIS: 43 DEGREES
EKG P-R INTERVAL: 156 MS
EKG Q-T INTERVAL: 350 MS
EKG QRS DURATION: 78 MS
EKG QTC CALCULATION (BAZETT): 446 MS
EKG R AXIS: 26 DEGREES
EKG T AXIS: 63 DEGREES
EKG VENTRICULAR RATE: 98 BPM
EOSINOPHIL # BLD: 3.9 %
EOSINOPHILS ABSOLUTE: 0.4 THOU/MM3 (ref 0–0.4)
ERYTHROCYTE [DISTWIDTH] IN BLOOD BY AUTOMATED COUNT: 15.9 % (ref 11.5–14.5)
ERYTHROCYTE [DISTWIDTH] IN BLOOD BY AUTOMATED COUNT: 47.1 FL (ref 35–45)
GFR SERPL CREATININE-BSD FRML MDRD: 89 ML/MIN/1.73M2
GLUCOSE BLD-MCNC: 264 MG/DL
GLUCOSE BLD-MCNC: 264 MG/DL (ref 70–108)
GLUCOSE BLD-MCNC: 276 MG/DL (ref 70–108)
GLUCOSE BLD-MCNC: 284 MG/DL (ref 70–108)
GLUCOSE BLD-MCNC: 418 MG/DL
GLUCOSE BLD-MCNC: 418 MG/DL (ref 70–108)
GLUCOSE BLD-MCNC: 443 MG/DL (ref 70–108)
HBA1C MFR BLD: 10.6 % (ref 4.4–6.4)
HBA1C MFR BLD: 10.8 % (ref 4.3–5.7)
HCT VFR BLD CALC: 43.8 % (ref 42–52)
HEMOGLOBIN: 15.2 GM/DL (ref 14–18)
IMMATURE GRANS (ABS): 0.09 THOU/MM3 (ref 0–0.07)
IMMATURE GRANULOCYTES: 0.8 %
LYMPHOCYTES # BLD: 23 %
LYMPHOCYTES ABSOLUTE: 2.6 THOU/MM3 (ref 1–4.8)
MAGNESIUM: 2 MG/DL (ref 1.6–2.4)
MCH RBC QN AUTO: 28.7 PG (ref 26–33)
MCHC RBC AUTO-ENTMCNC: 34.7 GM/DL (ref 32.2–35.5)
MCV RBC AUTO: 82.6 FL (ref 80–94)
MONOCYTES # BLD: 7.5 %
MONOCYTES ABSOLUTE: 0.9 THOU/MM3 (ref 0.4–1.3)
NUCLEATED RED BLOOD CELLS: 0 /100 WBC
OSMOLALITY CALCULATION: 283.1 MOSMOL/KG (ref 275–300)
PLATELET # BLD: 280 THOU/MM3 (ref 130–400)
PMV BLD AUTO: 9.6 FL (ref 9.4–12.4)
POTASSIUM SERPL-SCNC: 4.8 MEQ/L (ref 3.5–5.2)
PRO-BNP: < 5 PG/ML (ref 0–900)
RBC # BLD: 5.3 MILL/MM3 (ref 4.7–6.1)
SEG NEUTROPHILS: 64.1 %
SEGMENTED NEUTROPHILS ABSOLUTE COUNT: 7.4 THOU/MM3 (ref 1.8–7.7)
SODIUM BLD-SCNC: 132 MEQ/L (ref 135–145)
TOTAL PROTEIN: 8 G/DL (ref 6.1–8)
TROPONIN T: < 0.01 NG/ML
WBC # BLD: 11.5 THOU/MM3 (ref 4.8–10.8)

## 2019-07-15 PROCEDURE — 85025 COMPLETE CBC W/AUTO DIFF WBC: CPT

## 2019-07-15 PROCEDURE — 83735 ASSAY OF MAGNESIUM: CPT

## 2019-07-15 PROCEDURE — 82962 GLUCOSE BLOOD TEST: CPT | Performed by: NURSE PRACTITIONER

## 2019-07-15 PROCEDURE — 82248 BILIRUBIN DIRECT: CPT

## 2019-07-15 PROCEDURE — 99214 OFFICE O/P EST MOD 30 MIN: CPT | Performed by: NURSE PRACTITIONER

## 2019-07-15 PROCEDURE — 36415 COLL VENOUS BLD VENIPUNCTURE: CPT

## 2019-07-15 PROCEDURE — 2580000003 HC RX 258: Performed by: PHYSICIAN ASSISTANT

## 2019-07-15 PROCEDURE — 80053 COMPREHEN METABOLIC PANEL: CPT

## 2019-07-15 PROCEDURE — 84484 ASSAY OF TROPONIN QUANT: CPT

## 2019-07-15 PROCEDURE — 6370000000 HC RX 637 (ALT 250 FOR IP): Performed by: PHYSICIAN ASSISTANT

## 2019-07-15 PROCEDURE — 83036 HEMOGLOBIN GLYCOSYLATED A1C: CPT | Performed by: NURSE PRACTITIONER

## 2019-07-15 PROCEDURE — 93005 ELECTROCARDIOGRAM TRACING: CPT | Performed by: EMERGENCY MEDICINE

## 2019-07-15 PROCEDURE — 99285 EMERGENCY DEPT VISIT HI MDM: CPT

## 2019-07-15 PROCEDURE — 82948 REAGENT STRIP/BLOOD GLUCOSE: CPT

## 2019-07-15 PROCEDURE — 83036 HEMOGLOBIN GLYCOSYLATED A1C: CPT

## 2019-07-15 PROCEDURE — 82010 KETONE BODYS QUAN: CPT

## 2019-07-15 PROCEDURE — 83880 ASSAY OF NATRIURETIC PEPTIDE: CPT

## 2019-07-15 PROCEDURE — 93010 ELECTROCARDIOGRAM REPORT: CPT | Performed by: NUCLEAR MEDICINE

## 2019-07-15 PROCEDURE — 96374 THER/PROPH/DIAG INJ IV PUSH: CPT

## 2019-07-15 RX ORDER — 0.9 % SODIUM CHLORIDE 0.9 %
1000 INTRAVENOUS SOLUTION INTRAVENOUS ONCE
Status: COMPLETED | OUTPATIENT
Start: 2019-07-15 | End: 2019-07-15

## 2019-07-15 RX ADMIN — Medication 10 UNITS: at 14:00

## 2019-07-15 RX ADMIN — SODIUM CHLORIDE 1000 ML: 9 INJECTION, SOLUTION INTRAVENOUS at 14:00

## 2019-07-15 ASSESSMENT — ENCOUNTER SYMPTOMS
BACK PAIN: 0
ABDOMINAL PAIN: 0
CONSTIPATION: 0
VOMITING: 0
EYE DISCHARGE: 0
NAUSEA: 0
SHORTNESS OF BREATH: 0
DIARRHEA: 0
EYE REDNESS: 0
WHEEZING: 0
COUGH: 0
COLOR CHANGE: 0
RHINORRHEA: 0
SORE THROAT: 0

## 2019-07-15 NOTE — ED PROVIDER NOTES
[facility]  eMERGENCY dEPARTMENT eNCOUnter          CHIEF COMPLAINT       Chief Complaint   Patient presents with    Tachycardia    Hyperglycemia       Nurses Notes reviewed and I agree except as noted in the HPI. HISTORY OF PRESENT ILLNESS    Pollo Mcclelland is a 44 y.o. male who presents to the Emergency Department for the evaluation of hyperglycemia and an elevated heart rate. The patient states that he saw his PCP this morning at a routine check up, and it was noted that his BG was in the 400s and his heart rate was reportedly 115. The patient has a history of diabetes and has been noted to be uncontrolled and have medical noncompliance in the past. The patient reports using his Lantus and Novolog as instructed and states that he checks his BG 5 times daily. The patient denies any chest pain, shortness of breath, nausea, vomiting, abdominal pain, fever, or chills. He reports sweating but states that this is normal for him. The patient states that his blood glucose was 414 when he arrived to the ED. The patient also has a history of polyneuropathy, essential hypertension, and right AKA. The patient has no further concerns or symptoms at this time. The HPI was provided by the patient. REVIEW OF SYSTEMS     Review of Systems   Constitutional: Positive for diaphoresis (intermittent, chronic). Negative for chills, fatigue and fever. HENT: Negative for congestion, ear pain, rhinorrhea and sore throat. Eyes: Negative for discharge and redness. Respiratory: Negative for cough, shortness of breath and wheezing. Cardiovascular: Negative for chest pain and palpitations. Gastrointestinal: Negative for abdominal pain, constipation, diarrhea, nausea and vomiting. Endocrine:        Hyperglycemia   Genitourinary: Negative for decreased urine volume, difficulty urinating and dysuria. Musculoskeletal: Negative for arthralgias, back pain, myalgias, neck pain and neck stiffness.    Skin:

## 2019-07-15 NOTE — ED NOTES
Patient presents to the ED stating that his family doctor told him to come in for hyperglycemia and tachycardia.       Shaina Wallis, PRINCESS  55/68/86 [id number]

## 2019-07-15 NOTE — PROGRESS NOTES
friction rub. No murmur heard.    Pulmonary/Chest: Effort normal and breath sounds normal. No respiratory distress. He has no wheezes. He has no rales. Abdominal: Soft. He exhibits no distension. There is no tenderness. Musculoskeletal: He exhibits no edema or tenderness. Right AKA   Neurological: He is alert and oriented to person, place, and time. No cranial nerve deficit. Skin: Skin is warm. He is diaphoretic. No erythema. No pallor. Vitals reviewed. Labs Reviewed 7/15/2019:    Lab Results   Component Value Date    WBC 11.5 (H) 07/15/2019    HGB 15.2 07/15/2019    HCT 43.8 07/15/2019     07/15/2019    CHOL 161 10/10/2018    TRIG 328 (H) 10/10/2018    HDL 30 10/10/2018    ALT 16 07/15/2019    AST 19 07/15/2019     (L) 07/15/2019    K 4.8 07/15/2019    CL 92 (L) 07/15/2019    CREATININE 0.9 07/15/2019    BUN 15 07/15/2019    CO2 26 07/15/2019    TSH 5.540 (H) 10/17/2018    INR 1.06 01/03/2019    GLUF 179 (H) 08/24/2016    LABA1C 10.6 (H) 07/15/2019    LABMICR 1.21 10/10/2018       Assessment/Plan      1. Uncontrolled type 2 diabetes mellitus with hyperglycemia, with long-term current use of insulin (HCC)   Check blood sugar 4 times daily. Increase basaglar to 60 units twice daily  Increase Humalog to 22 units with meals. Call me sugars in 3 days. See diabetic educator/dietician as soon as possible    Due to elevated HR, glucose 418 in the office today, polyuria/polydipsia, will send to ED for labs and fluids. Call placed to ED and report given. - POCT glycosylated hemoglobin (Hb A1C)  - 49945 - Collection Capillary Blood Specimen  - POCT Glucose    2. Above knee amputation of right lower extremity ([address]  PT for gait training due to AKA. - [facility]    3. Gait instability  - [facility]      Return in about 4 weeks (around 8/12/2019). Patient given educational materials - see patient instructions.   Discussed use,

## 2019-07-23 ENCOUNTER — CARE COORDINATION (OUTPATIENT)
Dept: CARE COORDINATION | Age: 49
End: 2019-07-23

## 2019-07-23 NOTE — CARE COORDINATION
Pt continues to have elevated BS's. Had not increased dose of novolog to 22 units. Will start today. Taking correct dose of Basaglar. Diet continues to be very carb heavy. Referral made to our central dietician. She will be contacting him by phone. Pt no showed to DM clinic appt on Monday. Pt refused PCP  appt tomorrow as he is going out of town. appt arranged with you on 7/25. Below is list of most recent BS readings:    7/19-unable to give BS readings. [facility] BS's were in the 300's all day  7/20: AM-350 L-395 PM-425 HS-424  7/21: did not check at all that day. [facility] was too busy. 7/22: AM-415 L-430 PM-435 HS-500  7/23: AM-450 L-424 PM-435  I have advised pt to increase water consumption.    Please advise

## 2019-07-24 ENCOUNTER — TELEPHONE (OUTPATIENT)
Dept: WOUND CARE | Age: 49
End: 2019-07-24

## 2019-07-24 NOTE — TELEPHONE ENCOUNTER
Received call back from [facility]. Pt has been taking Lantus 60 units twice daily as discussed at last appt. Continues to only take 20 units of Humalog even though I informed pt that he was advised to increase to 22 units. Pt is not using the scale. States \"it doesn't work! I am all ready taking too much insulin and I am not going to take anymore than what I have been doing. \"  Asked pt if he was refusing to do the scale. Pt states \"no I am not saying that. All I'm saying is I was on a scale before and it didn't work. I still lost my leg. \"  Attempted to educate pt that the medication was not the reason. Pt unsure what scale he was put on. Informed him that I will check and will call back with sliding scale for him to start. Informed pt on importance of monitoring BS's closely and to check BS's before meals and at bedtime and to  call when BS's are not controlled vs waiting for next appt. Pt very short on phone. Reminded pt of appt with PCP tomorrow. Attempted to ask pt what BS's have been today. Pt avoided answering. Please clarify which group sliding scale pt is to be using. Please advise. Do you want pt to increase Lantus dose since he has been on 60 units twice daily since last office visit. Please advise.

## 2019-07-24 NOTE — CARE COORDINATION
Increase Lantus to 60 units twice daily. See me tomorrow. Has to check glucose as advised and cover all meals with his 22 units plus scale.

## 2019-07-25 ENCOUNTER — CARE COORDINATION (OUTPATIENT)
Dept: CARE COORDINATION | Age: 49
End: 2019-07-25

## 2019-07-26 ENCOUNTER — CARE COORDINATION (OUTPATIENT)
Dept: CARE COORDINATION | Age: 49
End: 2019-07-26

## 2019-07-26 NOTE — CARE COORDINATION
[address] and left voicemail regarding Dietitian referral. Left call back number and will follow up as appropriate.          Camille Jaramillo MS, [address] [facility], [place]  [phone]

## 2019-07-28 ASSESSMENT — ENCOUNTER SYMPTOMS
NAUSEA: 0
COUGH: 0
SHORTNESS OF BREATH: 0
SORE THROAT: 0
DIARRHEA: 0
TROUBLE SWALLOWING: 0
VOMITING: 0
ABDOMINAL PAIN: 0

## 2019-08-01 ENCOUNTER — TELEPHONE (OUTPATIENT)
Dept: WOUND CARE | Age: 49
End: 2019-08-01

## 2019-08-05 ENCOUNTER — TELEPHONE (OUTPATIENT)
Dept: WOUND CARE | Age: 49
End: 2019-08-05

## 2019-08-08 ENCOUNTER — CARE COORDINATION (OUTPATIENT)
Dept: CARE COORDINATION | Age: 49
End: 2019-08-08

## 2019-08-15 ENCOUNTER — CARE COORDINATION (OUTPATIENT)
Dept: CARE COORDINATION | Age: 49
End: 2019-08-15

## 2019-08-20 ENCOUNTER — TELEPHONE (OUTPATIENT)
Dept: INTERNAL MEDICINE CLINIC | Age: 49
End: 2019-08-20

## 2019-08-22 ENCOUNTER — HOSPITAL ENCOUNTER (EMERGENCY)
Age: 49
Discharge: HOME | End: 2019-08-22
Attending: FAMILY MEDICINE
Payer: MEDICARE

## 2019-08-22 ENCOUNTER — APPOINTMENT (OUTPATIENT)
Dept: GENERAL RADIOLOGY | Age: 49
End: 2019-08-22
Payer: MEDICARE

## 2019-08-22 VITALS
TEMPERATURE: 98.3 F | HEART RATE: 89 BPM | BODY MASS INDEX: 29.73 KG/M2 | WEIGHT: 185 LBS | DIASTOLIC BLOOD PRESSURE: 67 MMHG | HEIGHT: 66 IN | OXYGEN SATURATION: 98 % | RESPIRATION RATE: 15 BRPM | SYSTOLIC BLOOD PRESSURE: 118 MMHG

## 2019-08-22 DIAGNOSIS — M25.472 LEFT ANKLE SWELLING: ICD-10-CM

## 2019-08-22 DIAGNOSIS — M25.572 ACUTE LEFT ANKLE PAIN: Primary | ICD-10-CM

## 2019-08-22 LAB
ALBUMIN SERPL-MCNC: 3.8 G/DL (ref 3.5–5.1)
ALP BLD-CCNC: 82 U/L (ref 38–126)
ALT SERPL-CCNC: 16 U/L (ref 11–66)
ANION GAP SERPL CALCULATED.3IONS-SCNC: 14 MEQ/L (ref 8–16)
AST SERPL-CCNC: 20 U/L (ref 5–40)
BASOPHILS # BLD: 0.8 %
BASOPHILS ABSOLUTE: 0.1 THOU/MM3 (ref 0–0.1)
BILIRUB SERPL-MCNC: 0.2 MG/DL (ref 0.3–1.2)
BUN BLDV-MCNC: 11 MG/DL (ref 7–22)
CALCIUM SERPL-MCNC: 9.3 MG/DL (ref 8.5–10.5)
CHLORIDE BLD-SCNC: 97 MEQ/L (ref 98–111)
CO2: 25 MEQ/L (ref 23–33)
CREAT SERPL-MCNC: 0.9 MG/DL (ref 0.4–1.2)
EOSINOPHIL # BLD: 3.8 %
EOSINOPHILS ABSOLUTE: 0.3 THOU/MM3 (ref 0–0.4)
ERYTHROCYTE [DISTWIDTH] IN BLOOD BY AUTOMATED COUNT: 15.1 % (ref 11.5–14.5)
ERYTHROCYTE [DISTWIDTH] IN BLOOD BY AUTOMATED COUNT: 50.2 FL (ref 35–45)
GFR SERPL CREATININE-BSD FRML MDRD: 89 ML/MIN/1.73M2
GLUCOSE BLD-MCNC: 378 MG/DL (ref 70–108)
GLUCOSE BLD-MCNC: 410 MG/DL (ref 70–108)
HCT VFR BLD CALC: 44.1 % (ref 42–52)
HEMOGLOBIN: 14 GM/DL (ref 14–18)
IMMATURE GRANS (ABS): 0.04 THOU/MM3 (ref 0–0.07)
IMMATURE GRANULOCYTES: 1 %
LYMPHOCYTES # BLD: 26.4 %
LYMPHOCYTES ABSOLUTE: 2.3 THOU/MM3 (ref 1–4.8)
MCH RBC QN AUTO: 29 PG (ref 26–33)
MCHC RBC AUTO-ENTMCNC: 31.7 GM/DL (ref 32.2–35.5)
MCV RBC AUTO: 91.5 FL (ref 80–94)
MONOCYTES # BLD: 7.2 %
MONOCYTES ABSOLUTE: 0.6 THOU/MM3 (ref 0.4–1.3)
NUCLEATED RED BLOOD CELLS: 0 /100 WBC
OSMOLALITY CALCULATION: 286.9 MOSMOL/KG (ref 275–300)
PLATELET # BLD: 223 THOU/MM3 (ref 130–400)
PMV BLD AUTO: 9.7 FL (ref 9.4–12.4)
POTASSIUM SERPL-SCNC: 4.6 MEQ/L (ref 3.5–5.2)
RBC # BLD: 4.82 MILL/MM3 (ref 4.7–6.1)
SEG NEUTROPHILS: 61.3 %
SEGMENTED NEUTROPHILS ABSOLUTE COUNT: 5.3 THOU/MM3 (ref 1.8–7.7)
SODIUM BLD-SCNC: 136 MEQ/L (ref 135–145)
TOTAL PROTEIN: 7.5 G/DL (ref 6.1–8)
WBC # BLD: 8.7 THOU/MM3 (ref 4.8–10.8)

## 2019-08-22 PROCEDURE — 80053 COMPREHEN METABOLIC PANEL: CPT

## 2019-08-22 PROCEDURE — 36415 COLL VENOUS BLD VENIPUNCTURE: CPT

## 2019-08-22 PROCEDURE — 82948 REAGENT STRIP/BLOOD GLUCOSE: CPT

## 2019-08-22 PROCEDURE — 85025 COMPLETE CBC W/AUTO DIFF WBC: CPT

## 2019-08-22 PROCEDURE — 99284 EMERGENCY DEPT VISIT MOD MDM: CPT

## 2019-08-22 PROCEDURE — 73610 X-RAY EXAM OF ANKLE: CPT

## 2019-08-22 ASSESSMENT — ENCOUNTER SYMPTOMS
SHORTNESS OF BREATH: 0
BACK PAIN: 0
VOMITING: 0
NAUSEA: 0
ABDOMINAL PAIN: 0
CONSTIPATION: 0
WHEEZING: 0
BLOOD IN STOOL: 0
COUGH: 0
CHEST TIGHTNESS: 0
DIARRHEA: 0

## 2019-08-22 ASSESSMENT — PAIN DESCRIPTION - ORIENTATION: ORIENTATION: LEFT

## 2019-08-22 ASSESSMENT — PAIN DESCRIPTION - PAIN TYPE: TYPE: ACUTE PAIN

## 2019-08-22 ASSESSMENT — PAIN SCALES - GENERAL: PAINLEVEL_OUTOF10: 10

## 2019-08-22 ASSESSMENT — PAIN DESCRIPTION - LOCATION: LOCATION: FOOT

## 2019-08-22 NOTE — ED NOTES
Lying in bed resting eyes closed respirations easy and unlabored.      Olena Ortega RN  08/22/19 9115

## 2019-08-22 NOTE — ED PROVIDER NOTES
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 SURGICAL HISTORY      has a past surgical history that includes other surgical history (Right, 1/14/15); Abscess Drainage (Right); Toe amputation (Right, 1/16/15); Foot Debridement (Right, 07/01/2016); Leg amputation below knee (Right, 07/20/2016); [place] tooth extraction (?when); pr drain infect shoulder bursa (Left, 8/18/2017); pr office/outpt visit,procedure only (Right, 9/20/2018); incision and drainage (Right, 2/18/2019); Leg amputation below knee (Right, 4/4/2019); and AMPUTATION ABOVE KNEE (Right, 4/18/2019). CURRENT MEDICATIONS       Discharge Medication List as of 8/22/2019  5:57 PM      CONTINUE these medications which have NOT CHANGED    Details   insulin glargine (LANTUS SOLOSTAR) 100 UNIT/ML injection pen Inject 55 Units into the skin 2 times dailyHistorical Med      insulin aspart (NOVOLOG) 100 UNIT/ML injection vial Inject 20 Units into the skin 3 times daily (before meals) Indications: increased to 22 units on 7/18, but pt never increased dose.  Historical Med      sertraline (ZOLOFT) 100 MG tablet Take 1.5 tablets by mouth daily, Disp-45 tablet, R-0Normal      blood glucose monitor kit and supplies Use to test blood sugars DX:E11.65, Disp-1 kit, R-0, Normal      Lancets MISC Disp-200 each, R-0, NormalUse to test blood sugars 4 times daily DX:E11.65      Insulin Syringe-Needle U-100 29G X 1/2\" 0.3 ML MISC 4 TIMES DAILY AFTER MEALS AND BEFORE BEDTIME Starting Tue 4/30/2019, Disp-200 each, R-0, Normal      vitamin D-3 (CHOLECALCIFEROL) 5000 units TABS Take 1 tablet by mouth daily, Disp-30 tablet, R-0Normal      blood glucose monitor strips Accucheck Sheila Test Strips Test blood sugars 4 times daily DX:E11.65, Disp-400 strip, R-5, Normal             ALLERGIES     is allergic to pcn [penicillins] and clindamycin/lincomycin. FAMILY HISTORY      He indicated that his mother is . He reported the following about his father: unknown. He indicated that the status of his maternal grandmother is unknown. He indicated that the status of his maternal grandfather is unknown.   family history includes Arthritis in his maternal grandfather; Depression in his mother; Diabetes in his mother; Early Death in his mother; Heart Disease in his maternal grandfather; High Blood Pressure in his mother; High Cholesterol in his mother; Other in his mother; Vision Loss in his maternal grandmother. SOCIAL HISTORY      reports that he quit smoking about 34 years ago. His smoking use included cigars. He has a 65.00 pack-year smoking history. He has never used smokeless tobacco. He reports that he drank alcohol. He reports that he does not use drugs. PHYSICAL EXAM     INITIAL VITALS:  height is 5' 6\" (1.676 m) and weight is 185 lb (83.9 kg). His oral temperature is 98.3 °F (36.8 °C). His blood pressure is 118/67 and his pulse is 89. His respiration is 15 and oxygen saturation is 98%. Physical Exam   Constitutional: He is oriented to person, place, and time. He appears well-developed and well-nourished. HENT:   Head: Normocephalic and atraumatic. Right Ear: External ear normal.   Left Ear: External ear normal.   Nose: Nose normal.   Mouth/Throat: Oropharynx is clear and moist.   Eyes: Pupils are equal, round, and reactive to light. Conjunctivae and EOM are normal.   Neck: Normal range of motion. Neck supple. Cardiovascular: Normal rate and regular rhythm. Pulmonary/Chest: Effort normal and breath sounds normal.   Abdominal: Soft. Bowel sounds are normal.   Musculoskeletal:        Left ankle: He exhibits swelling. He exhibits normal range of motion, no ecchymosis, no deformity, no laceration and normal pulse. Resp: 18 15   Temp: 98.3 °F (36.8 °C)    TempSrc: Oral    SpO2:  98%   Weight: 185 lb (83.9 kg)    Height: 5' 6\" (1.676 m)        MDM  Number of Diagnoses or Management Options  Acute left ankle pain:   Left ankle swelling:   Diagnosis management comments: The pt was seen and evaluated by me. Within the department, I observed the pt's vital signs to be within acceptable range. Laboratory and Radiological studies were performed, results were reviewed with the patient. observed the pt's condition to remain stable during the duration of their stay. Encouraged to begin wearing compression stockings. Education materials were given. Reassurance was provided and anticipatory guidance was discussed. I explained my proposed course of treatment to the pt, and they were amenable to my decision. They were discharged home, and they will return to the ED if their symptoms become more severe in nature, or otherwise change. 4:21 PM: The patient was seen and evaluated. CRITICAL CARE:        CONSULTS:  None    PROCEDURES:  None     FINAL IMPRESSION     1. Acute left ankle pain    2.  Left ankle swelling          DISPOSITION/PLAN   Discharge     PATIENT REFERRED TO:  MARILYN Garrido - CNP  [facility] [address]  [place] [address]  [phone]    Schedule an appointment as soon as possible for a visit in 1 week        DISCHARGE MEDICATIONS:     Discharge Medication List as of 8/22/2019  5:57 PM          (Please note that portions of this note were completed with a voice recognition program.  Efforts were made to edit thedictations but occasionally words are mis-transcribed.)    Gaye Acosta MD 8/22/19 9:47 PM                            Gaye Acosta MD  08/22/19 [id number]

## 2019-08-23 ENCOUNTER — CARE COORDINATION (OUTPATIENT)
Dept: CARE COORDINATION | Age: 49
End: 2019-08-23

## 2019-08-23 NOTE — CARE COORDINATION
daily Indications: 60 units    Yes Historical Provider, MD   insulin aspart (NOVOLOG) 100 UNIT/ML injection vial Inject 20 Units into the skin 3 times daily (before meals) Indications: increased to 22 units on 7/18, but pt never increased dose.     Yes Historical Provider, MD   sertraline (ZOLOFT) 100 MG tablet Take 1.5 tablets by mouth daily 4/30/19  Yes Jon Lopez MD   vitamin D-3 (CHOLECALCIFEROL) 5000 units TABS Take 1 tablet by mouth daily 4/30/19  Yes Jon Lopez MD   blood glucose monitor kit and supplies Use to test blood sugars DX:E11.65 4/30/19   Jon Lopez MD   Lancets MISC Use to test blood sugars 4 times daily DX:E11.65 4/30/19   Jon Lopez MD   Insulin Syringe-Needle U-100 29G X 1/2\" 0.3 ML MISC 1 each by Does not apply route 4 times daily (after meals and at bedtime) 4/30/19   Jon Lopez MD   blood glucose monitor strips Accucheck Sheila Test Strips Test blood sugars 4 times daily DX:E11.65 10/23/18   MARILYN Mcguire CNP       Future Appointments   Date Time Provider Ebenezer Holcombi   8/26/2019 11:40 AM MARILYN Gibson CNP [facility] [address] [facility]

## 2019-08-26 ENCOUNTER — OFFICE VISIT (OUTPATIENT)
Dept: INTERNAL MEDICINE CLINIC | Age: 49
End: 2019-08-26
Payer: MEDICARE

## 2019-08-26 VITALS
HEIGHT: 66 IN | SYSTOLIC BLOOD PRESSURE: 131 MMHG | DIASTOLIC BLOOD PRESSURE: 60 MMHG | BODY MASS INDEX: 29.86 KG/M2 | HEART RATE: 94 BPM

## 2019-08-26 DIAGNOSIS — E78.5 HYPERLIPIDEMIA ASSOCIATED WITH TYPE 2 DIABETES MELLITUS (HCC): ICD-10-CM

## 2019-08-26 DIAGNOSIS — M25.472 LEFT ANKLE SWELLING: ICD-10-CM

## 2019-08-26 DIAGNOSIS — E11.69 HYPERLIPIDEMIA ASSOCIATED WITH TYPE 2 DIABETES MELLITUS (HCC): ICD-10-CM

## 2019-08-26 DIAGNOSIS — I10 ESSENTIAL HYPERTENSION: ICD-10-CM

## 2019-08-26 DIAGNOSIS — E11.40 DIABETIC NEUROPATHY, PAINFUL (HCC): ICD-10-CM

## 2019-08-26 DIAGNOSIS — Z79.4 UNCONTROLLED TYPE 2 DIABETES MELLITUS WITH HYPERGLYCEMIA, WITH LONG-TERM CURRENT USE OF INSULIN (HCC): Primary | ICD-10-CM

## 2019-08-26 DIAGNOSIS — S78.111A ABOVE KNEE AMPUTATION OF RIGHT LOWER EXTREMITY (HCC): ICD-10-CM

## 2019-08-26 DIAGNOSIS — E11.65 UNCONTROLLED TYPE 2 DIABETES MELLITUS WITH HYPERGLYCEMIA, WITH LONG-TERM CURRENT USE OF INSULIN (HCC): Primary | ICD-10-CM

## 2019-08-26 PROCEDURE — 99214 OFFICE O/P EST MOD 30 MIN: CPT | Performed by: NURSE PRACTITIONER

## 2019-08-26 RX ORDER — GABAPENTIN 300 MG/1
300 CAPSULE ORAL 2 TIMES DAILY
Qty: 60 CAPSULE | Refills: 2 | Status: SHIPPED | OUTPATIENT
Start: 2019-08-26 | End: 2019-09-03

## 2019-08-26 ASSESSMENT — ENCOUNTER SYMPTOMS
DIARRHEA: 0
COUGH: 1
TROUBLE SWALLOWING: 0
VOMITING: 0
EYE ITCHING: 0
CONSTIPATION: 0
SORE THROAT: 0
CHOKING: 0
SHORTNESS OF BREATH: 0
ABDOMINAL PAIN: 0
NAUSEA: 0

## 2019-08-26 NOTE — PATIENT INSTRUCTIONS
Increase Lantus to 62 units twice daily and Novolog to 22 units three times daily. Call in sugars in 3 days for next increase. Would like to increase approx every 3 days until glucose lower than 200.      Will start Gabapentin 300 mg

## 2019-08-26 NOTE — PROGRESS NOTES
Shayla Salcido  INTERNAL MEDICINE  [address] [facility] [address]  Dept: [phone]  Dept Fax: 24 : [phone]     Visit Date:  8/26/2019    Patient:  Francis Carrillo  YOB: 1970    HPI:     Chief Complaint   Patient presents with    Follow-Up from Hospital       HPI  DM - Last A1C 10.8 7/15/19. He did not increase his mealtime insulin as advised last time I saw him and has missed multiple appointments since that date. Glucose in 300's routinely. Did not bring meter today. Using a friends. Agreeable to increase by 2 units Lantus and Novolog dosing. States he drinks diet pop 2 liter every 3 days and water. No SSB consumption. Denies dietary indiscretion. Dinner last night beef and rice, hasn't eaten today. He has alrady had right AKA. He is reluctant to change insulin as he states \"I'm on too much already\". Explained to pt that he needs improved glucose control to help with his other symptoms, he is will to increase to 2 units every 3 days until glucose <200. Pain - Right stump constant 8-9/10 sharp pain, wakes him up at night. Left ankles is swollen slightly, xray negative in ED. XR with soft tissue swelling of left ankle in ED. Pain in left upper calf and knee, constant, 8-9/10 sharp. Left leg is numb, from knee down to toes. Right hand numb more than left, stump is numb. States even painful at rest at night at times. No wounds to any painful areas. Controlled Substance Monitoring:    Acute and Chronic Pain Monitoring:   RX Monitoring 8/26/2019   Attestation -   Acute Pain Prescriptions -   Periodic Controlled Substance Monitoring Possible medication side effects, risk of tolerance/dependence & alternative treatments discussed. ;No signs of potential drug abuse or diversion identified. ;Assessed functional status. ;Random urine drug screen sent today.        Medications    Current Outpatient Medications:     insulin aspart (NOVOLOG) 100 UNIT/ML injection vial, Inject 22 Units into the skin 3 times daily (before meals) Indications: increased to 22 units on 7/18, but pt never increased dose., Disp: 3 vial, Rfl: 3    gabapentin (NEURONTIN) 300 MG capsule, Take 1 capsule by mouth 2 times daily for 90 days. Intended supply: 30 days, Disp: 60 capsule, Rfl: 2    insulin glargine (LANTUS SOLOSTAR) 100 UNIT/ML injection pen, Inject 62 Units into the skin 2 times daily, Disp: 5 pen, Rfl:     sertraline (ZOLOFT) 100 MG tablet, Take 1.5 tablets by mouth daily, Disp: 45 tablet, Rfl: 0    blood glucose monitor kit and supplies, Use to test blood sugars DX:E11.65, Disp: 1 kit, Rfl: 0    Lancets MISC, Use to test blood sugars 4 times daily DX:E11.65, Disp: 200 each, Rfl: 0    Insulin Syringe-Needle U-100 29G X 1/2\" 0.3 ML MISC, 1 each by Does not apply route 4 times daily (after meals and at bedtime), Disp: 200 each, Rfl: 0    vitamin D-3 (CHOLECALCIFEROL) 5000 units TABS, Take 1 tablet by mouth daily, Disp: 30 tablet, Rfl: 0    blood glucose monitor strips, Accucheck Sheila Test Strips Test blood sugars 4 times daily DX:E11.65, Disp: 400 strip, Rfl: 5    The patient is allergic to pcn [penicillins] and clindamycin/lincomycin. Past Medical History  Saji Mills  has a past medical history of Bipolar 2 disorder ([facility] [address] Diabetes mellitus type 2, uncontrolled ([facility] [address] Diabetic polyneuropathy ([facility] [address] Diabetic ulcer of right foot associated with type 2 diabetes mellitus ([facility] [address] [facility] Essential hypertension, GERD (gastroesophageal reflux disease), Hammer toe of left foot, Heart murmur, History of tobacco abuse, Hx of AKA (above knee amputation), right ([address] Macular edema, diabetic, bilateral ([facility] [address] [facility] Marijuana abuse in remission, Onychomycosis, Other disorders of kidney and ureter in diseases classified elsewhere, and 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     Past Surgical History  The patient  has a past surgical history that

## 2019-09-02 ENCOUNTER — APPOINTMENT (OUTPATIENT)
Dept: GENERAL RADIOLOGY | Age: 49
DRG: 202 | End: 2019-09-02
Payer: MEDICARE

## 2019-09-02 ENCOUNTER — HOSPITAL ENCOUNTER (EMERGENCY)
Age: 49
Discharge: HOME | DRG: 202 | End: 2019-09-02
Payer: MEDICARE

## 2019-09-02 VITALS
OXYGEN SATURATION: 98 % | DIASTOLIC BLOOD PRESSURE: 75 MMHG | WEIGHT: 170 LBS | RESPIRATION RATE: 18 BRPM | TEMPERATURE: 98.3 F | HEART RATE: 92 BPM | HEIGHT: 66 IN | SYSTOLIC BLOOD PRESSURE: 140 MMHG | BODY MASS INDEX: 27.32 KG/M2

## 2019-09-02 DIAGNOSIS — J10.1 INFLUENZA B: Primary | ICD-10-CM

## 2019-09-02 LAB
ALBUMIN SERPL-MCNC: 3.6 G/DL (ref 3.5–5.1)
ALP BLD-CCNC: 72 U/L (ref 38–126)
ALT SERPL-CCNC: 17 U/L (ref 11–66)
AMPHETAMINE+METHAMPHETAMINE URINE SCREEN: NEGATIVE
ANION GAP SERPL CALCULATED.3IONS-SCNC: 14 MEQ/L (ref 8–16)
AST SERPL-CCNC: 20 U/L (ref 5–40)
BACTERIA: ABNORMAL /HPF
BARBITURATE QUANTITATIVE URINE: NEGATIVE
BASOPHILS # BLD: 0.6 %
BASOPHILS ABSOLUTE: 0.1 THOU/MM3 (ref 0–0.1)
BENZODIAZEPINE QUANTITATIVE URINE: NEGATIVE
BILIRUB SERPL-MCNC: 0.3 MG/DL (ref 0.3–1.2)
BILIRUBIN DIRECT: < 0.2 MG/DL (ref 0–0.3)
BILIRUBIN URINE: NEGATIVE
BLOOD, URINE: NEGATIVE
BUN BLDV-MCNC: 11 MG/DL (ref 7–22)
CALCIUM SERPL-MCNC: 9.1 MG/DL (ref 8.5–10.5)
CANNABINOID QUANTITATIVE URINE: NEGATIVE
CASTS 2: ABNORMAL /LPF
CASTS UA: ABNORMAL /LPF
CHARACTER, URINE: ABNORMAL
CHLORIDE BLD-SCNC: 93 MEQ/L (ref 98–111)
CO2: 23 MEQ/L (ref 23–33)
COCAINE METABOLITE QUANTITATIVE URINE: NEGATIVE
COLOR: YELLOW
CREAT SERPL-MCNC: 0.9 MG/DL (ref 0.4–1.2)
CRYSTALS, UA: ABNORMAL
EKG ATRIAL RATE: 95 BPM
EKG P AXIS: 42 DEGREES
EKG P-R INTERVAL: 170 MS
EKG Q-T INTERVAL: 352 MS
EKG QRS DURATION: 80 MS
EKG QTC CALCULATION (BAZETT): 442 MS
EKG R AXIS: 17 DEGREES
EKG T AXIS: 40 DEGREES
EKG VENTRICULAR RATE: 95 BPM
EOSINOPHIL # BLD: 4.8 %
EOSINOPHILS ABSOLUTE: 0.5 THOU/MM3 (ref 0–0.4)
EPITHELIAL CELLS, UA: ABNORMAL /HPF
ERYTHROCYTE [DISTWIDTH] IN BLOOD BY AUTOMATED COUNT: 14.8 % (ref 11.5–14.5)
ERYTHROCYTE [DISTWIDTH] IN BLOOD BY AUTOMATED COUNT: 45.4 FL (ref 35–45)
FLU A ANTIGEN: NEGATIVE
FLU B ANTIGEN: POSITIVE
GFR SERPL CREATININE-BSD FRML MDRD: 89 ML/MIN/1.73M2
GLUCOSE BLD-MCNC: 212 MG/DL (ref 70–108)
GLUCOSE BLD-MCNC: 391 MG/DL (ref 70–108)
GLUCOSE BLD-MCNC: 410 MG/DL (ref 70–108)
GLUCOSE BLD-MCNC: 413 MG/DL (ref 70–108)
GLUCOSE URINE: >= 1000 MG/DL
GROUP A STREP CULTURE, REFLEX: NEGATIVE
HCT VFR BLD CALC: 42.8 % (ref 42–52)
HEMOGLOBIN: 14.6 GM/DL (ref 14–18)
IMMATURE GRANS (ABS): 0.07 THOU/MM3 (ref 0–0.07)
IMMATURE GRANULOCYTES: 1 %
KETONES, URINE: NEGATIVE
LACTIC ACID, SEPSIS: 2.2 MMOL/L (ref 0.5–1.9)
LACTIC ACID, SEPSIS: 3.7 MMOL/L (ref 0.5–1.9)
LACTIC ACID: 2.2 MMOL/L (ref 0.5–2.2)
LEUKOCYTE ESTERASE, URINE: ABNORMAL
LYMPHOCYTES # BLD: 25.5 %
LYMPHOCYTES ABSOLUTE: 2.5 THOU/MM3 (ref 1–4.8)
MCH RBC QN AUTO: 29.2 PG (ref 26–33)
MCHC RBC AUTO-ENTMCNC: 34.1 GM/DL (ref 32.2–35.5)
MCV RBC AUTO: 85.6 FL (ref 80–94)
MISCELLANEOUS 2: ABNORMAL
MONOCYTES # BLD: 7.6 %
MONOCYTES ABSOLUTE: 0.7 THOU/MM3 (ref 0.4–1.3)
NITRITE, URINE: NEGATIVE
NUCLEATED RED BLOOD CELLS: 0 /100 WBC
OPIATES, URINE: NEGATIVE
OSMOLALITY CALCULATION: 277.5 MOSMOL/KG (ref 275–300)
OXYCODONE: NEGATIVE
PH UA: 7 (ref 5–9)
PHENCYCLIDINE QUANTITATIVE URINE: NEGATIVE
PLATELET # BLD: 235 THOU/MM3 (ref 130–400)
PMV BLD AUTO: 9.4 FL (ref 9.4–12.4)
POTASSIUM SERPL-SCNC: 3.9 MEQ/L (ref 3.5–5.2)
PROCALCITONIN: 0.06 NG/ML (ref 0.01–0.09)
PROTEIN UA: NEGATIVE
RBC # BLD: 5 MILL/MM3 (ref 4.7–6.1)
RBC URINE: ABNORMAL /HPF
REFLEX THROAT C + S: NORMAL
RENAL EPITHELIAL, UA: ABNORMAL
SEG NEUTROPHILS: 60.8 %
SEGMENTED NEUTROPHILS ABSOLUTE COUNT: 5.9 THOU/MM3 (ref 1.8–7.7)
SODIUM BLD-SCNC: 130 MEQ/L (ref 135–145)
SPECIFIC GRAVITY, URINE: > 1.03 (ref 1–1.03)
TOTAL PROTEIN: 7 G/DL (ref 6.1–8)
TROPONIN T: < 0.01 NG/ML
UROBILINOGEN, URINE: 0.2 EU/DL (ref 0–1)
WBC # BLD: 9.7 THOU/MM3 (ref 4.8–10.8)
WBC UA: ABNORMAL /HPF
YEAST: ABNORMAL

## 2019-09-02 PROCEDURE — 87040 BLOOD CULTURE FOR BACTERIA: CPT

## 2019-09-02 PROCEDURE — 87070 CULTURE OTHR SPECIMN AEROBIC: CPT

## 2019-09-02 PROCEDURE — 96374 THER/PROPH/DIAG INJ IV PUSH: CPT

## 2019-09-02 PROCEDURE — 93005 ELECTROCARDIOGRAM TRACING: CPT | Performed by: PHYSICIAN ASSISTANT

## 2019-09-02 PROCEDURE — 84145 PROCALCITONIN (PCT): CPT

## 2019-09-02 PROCEDURE — 80053 COMPREHEN METABOLIC PANEL: CPT

## 2019-09-02 PROCEDURE — 99284 EMERGENCY DEPT VISIT MOD MDM: CPT

## 2019-09-02 PROCEDURE — 6370000000 HC RX 637 (ALT 250 FOR IP): Performed by: PHYSICIAN ASSISTANT

## 2019-09-02 PROCEDURE — 80307 DRUG TEST PRSMV CHEM ANLYZR: CPT

## 2019-09-02 PROCEDURE — 84484 ASSAY OF TROPONIN QUANT: CPT

## 2019-09-02 PROCEDURE — 81001 URINALYSIS AUTO W/SCOPE: CPT

## 2019-09-02 PROCEDURE — 85025 COMPLETE CBC W/AUTO DIFF WBC: CPT

## 2019-09-02 PROCEDURE — 87880 STREP A ASSAY W/OPTIC: CPT

## 2019-09-02 PROCEDURE — 83605 ASSAY OF LACTIC ACID: CPT

## 2019-09-02 PROCEDURE — 82948 REAGENT STRIP/BLOOD GLUCOSE: CPT

## 2019-09-02 PROCEDURE — 71046 X-RAY EXAM CHEST 2 VIEWS: CPT

## 2019-09-02 PROCEDURE — 87804 INFLUENZA ASSAY W/OPTIC: CPT

## 2019-09-02 PROCEDURE — 36415 COLL VENOUS BLD VENIPUNCTURE: CPT

## 2019-09-02 PROCEDURE — 87086 URINE CULTURE/COLONY COUNT: CPT

## 2019-09-02 PROCEDURE — 2580000003 HC RX 258: Performed by: PHYSICIAN ASSISTANT

## 2019-09-02 PROCEDURE — 82248 BILIRUBIN DIRECT: CPT

## 2019-09-02 RX ORDER — 0.9 % SODIUM CHLORIDE 0.9 %
1313 INTRAVENOUS SOLUTION INTRAVENOUS ONCE
Status: COMPLETED | OUTPATIENT
Start: 2019-09-02 | End: 2019-09-02

## 2019-09-02 RX ORDER — SODIUM CHLORIDE 9 MG/ML
INJECTION, SOLUTION INTRAVENOUS CONTINUOUS
Status: DISCONTINUED | OUTPATIENT
Start: 2019-09-02 | End: 2019-09-02 | Stop reason: HOSPADM

## 2019-09-02 RX ORDER — OSELTAMIVIR PHOSPHATE 75 MG/1
75 CAPSULE ORAL 2 TIMES DAILY
Qty: 10 CAPSULE | Refills: 0 | Status: ON HOLD | OUTPATIENT
Start: 2019-09-02 | End: 2019-09-04 | Stop reason: SDUPTHER

## 2019-09-02 RX ORDER — DEXTROMETHORPHAN POLISTIREX 30 MG/5ML
60 SUSPENSION ORAL 2 TIMES DAILY PRN
Qty: 148 ML | Refills: 0 | Status: SHIPPED | OUTPATIENT
Start: 2019-09-02 | End: 2019-09-12

## 2019-09-02 RX ORDER — ACETAMINOPHEN 500 MG
1000 TABLET ORAL ONCE
Status: COMPLETED | OUTPATIENT
Start: 2019-09-02 | End: 2019-09-02

## 2019-09-02 RX ORDER — ACETAMINOPHEN 500 MG
500 TABLET ORAL EVERY 6 HOURS PRN
Qty: 120 TABLET | Refills: 0 | Status: ON HOLD | OUTPATIENT
Start: 2019-09-02 | End: 2019-10-17 | Stop reason: HOSPADM

## 2019-09-02 RX ORDER — 0.9 % SODIUM CHLORIDE 0.9 %
500 INTRAVENOUS SOLUTION INTRAVENOUS ONCE
Status: DISCONTINUED | OUTPATIENT
Start: 2019-09-02 | End: 2019-09-02 | Stop reason: HOSPADM

## 2019-09-02 RX ADMIN — SODIUM CHLORIDE 1313 ML: 9 INJECTION, SOLUTION INTRAVENOUS at 10:45

## 2019-09-02 RX ADMIN — INSULIN HUMAN 10 UNITS: 100 INJECTION, SOLUTION PARENTERAL at 11:13

## 2019-09-02 RX ADMIN — ACETAMINOPHEN 1000 MG: 500 TABLET, FILM COATED ORAL at 14:36

## 2019-09-02 ASSESSMENT — PAIN SCALES - GENERAL
PAINLEVEL_OUTOF10: 8
PAINLEVEL_OUTOF10: 8

## 2019-09-02 ASSESSMENT — PAIN DESCRIPTION - PAIN TYPE: TYPE: ACUTE PAIN

## 2019-09-02 ASSESSMENT — PAIN DESCRIPTION - DESCRIPTORS: DESCRIPTORS: ACHING

## 2019-09-02 ASSESSMENT — ENCOUNTER SYMPTOMS
COUGH: 1
RHINORRHEA: 0
WHEEZING: 0
SINUS PAIN: 0
SHORTNESS OF BREATH: 0
CONSTIPATION: 0
DIARRHEA: 0
VOMITING: 0
ABDOMINAL PAIN: 0
SINUS PRESSURE: 0
BACK PAIN: 0
SORE THROAT: 1
NAUSEA: 0

## 2019-09-02 ASSESSMENT — PAIN DESCRIPTION - LOCATION: LOCATION: GENERALIZED

## 2019-09-02 ASSESSMENT — PAIN DESCRIPTION - FREQUENCY: FREQUENCY: CONTINUOUS

## 2019-09-02 NOTE — ED NOTES
Patient presents to ED for sore throat, cough, generalized body aches, and nasal congestion. States the cough and congestion began two days ago. Generalized body aches and fever began yesterday. Reports she is unsure how high his temperature has been because he has not check it. Rates pain 8/10. Shows no signs of distress. Skin warm and dry. Respirations easy and unlabored.       Delfino Quiroz RN  09/02/19 [id number]

## 2019-09-02 NOTE — ED PROVIDER NOTES
Negative for arthralgias, back pain, myalgias, neck pain and neck stiffness. Hematological: Negative for adenopathy. Psychiatric/Behavioral: Negative for confusion. PAST MEDICAL HISTORY    has a past medical history of Bipolar 2 disorder ([address] Diabetes mellitus type 2, uncontrolled ([address] Diabetic polyneuropathy ([address] Diabetic ulcer of right foot associated with type 2 diabetes mellitus ([address] Essential hypertension, GERD (gastroesophageal reflux disease), Hammer toe of left foot, Heart murmur, History of tobacco abuse, Hx of AKA (above knee amputation), right ([address] Macular edema, diabetic, bilateral ([address] Marijuana abuse in remission, Onychomycosis, Other disorders of kidney and ureter in diseases classified elsewhere, and 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 SURGICAL HISTORY      has a past surgical history that includes other surgical history (Right, 1/14/15); Abscess Drainage (Right); Toe amputation (Right, 1/16/15); Foot Debridement (Right, 07/01/2016); Leg amputation below knee (Right, 07/20/2016); [place] tooth extraction (?when); pr drain infect shoulder bursa (Left, 8/18/2017); pr office/outpt visit,procedure only (Right, 9/20/2018); incision and drainage (Right, 2/18/2019); Leg amputation below knee (Right, 4/4/2019); and AMPUTATION ABOVE KNEE (Right, 4/18/2019). CURRENT MEDICATIONS       Previous Medications    BLOOD GLUCOSE MONITOR KIT AND SUPPLIES    Use to test blood sugars DX:E11.65    BLOOD GLUCOSE MONITOR STRIPS    Accucheck Sheila Test Strips Test blood sugars 4 times daily DX:E11.65    GABAPENTIN (NEURONTIN) 300 MG CAPSULE    Take 1 capsule by mouth 2 times daily for 90 days. Intended supply: 30 days    INSULIN ASPART (NOVOLOG) 100 UNIT/ML INJECTION VIAL    Inject 22 Units into the skin 3 times daily (before meals) Indications: increased to 22 units on 7/18, but pt never increased dose.     INSULIN GLARGINE (LANTUS SOLOSTAR) 100 UNIT/ML INJECTION Effort normal and breath sounds normal. No stridor. No respiratory distress. He has no wheezes. He has no rales. He exhibits no tenderness. Abdominal: Soft. Bowel sounds are normal. He exhibits no distension and no mass. There is no tenderness. There is no rebound and no guarding. No hernia. Musculoskeletal: Normal range of motion. He exhibits no edema. Lymphadenopathy:     He has no cervical adenopathy. Neurological: He is alert and oriented to person, place, and time. He exhibits normal muscle tone. Coordination normal. GCS eye subscore is 4. GCS verbal subscore is 5. GCS motor subscore is 6. Skin: Skin is warm and dry. No rash noted. He is not diaphoretic. No erythema. No pallor. Psychiatric: He has a normal mood and affect. His behavior is normal. Thought content normal.   Nursing note and vitals reviewed. DIFFERENTIAL DIAGNOSIS:   Includes but not limited to: Viral URI, influenza, bronchitis, pneumonia, sepsis, strep versus viral pharyngitis, sinusitis, rhinitis    DIAGNOSTIC RESULTS     EKG: All EKG's are interpreted by the Emergency Department Physician who either signs or Co-signsthis chart in the absence of a cardiologist.  EKG Emergency (Preliminary result)    Component (Lab Inquiry)   Collection Time Result Time Ventricular Rate Atrial Rate P-R Interval QRS Duration Q-T Interval   09/02/19 10:48:25 09/02/19 10:49:13 95 95 170 80 352       Collection Time Result Time QTc Calculation (Bazett) P Axis R Axis T Axis   09/02/19 10:48:25 09/02/19 10:49:13 442 42 17 40         Preliminary result                Narrative:    Normal sinus rhythm  Normal ECG  When compared with ECG of 15-JUL-2019 13:19,  No significant change was found              RADIOLOGY: non-plain film images(s) such as CT, Ultrasound and MRI are read by the radiologist.  Plainradiographic images are visualized and preliminarily interpreted by the emergency physician unless otherwise stated below.   XR CHEST STANDARD (2 I explained to the patient that I believe his URI symptoms to be viral in origin and that I do not believe the use of antibiotics is appropriate or likely to be beneficial. The patient vocalized understanding of and agreement with this assessment. The patient will continue symptomatic treatment and ensure adequate hydration and food intake. I explained my proposed course of treatment to the patient, who was amenable to my treatment and discharge decisions. The patient was discharged home in stable condition with prescriptions for Tamiflu, Robitussin, phenol sore throat spray, and Tylenol, and the patient will return to the ED if the symptoms become more severe in nature or otherwise change. Return precautions were given. CRITICAL CARE:   None    CONSULTS:  Discussed the case with my attending physician in the Emergency Department, Dr. George Morgan, who agreed with my workup, treatment, and disposition decisions. Regarding the patient's lactic acidosis, Dr. George Morgan advises IV saline and recheck of lactic acid levels. She states that the patient may be discharged to home if lactic acid improves, and the patient was amenable with this plan. PROCEDURES:  None    FINAL IMPRESSION      1. Influenza B          DISPOSITION/PLAN     1. Influenza B       I have given the patient strict written and verbal instructions about care at home, follow-up, and signs and symptoms of worsening of condition, and the patient did verbalize understanding of these instructions. Patient was discharged in stable condition. Will return if symptoms change or worsen, or for any sign or symptom deemed emergent by the patient or family members. Follow up as an outpatient or sooner if symptoms warrant.      PATIENT REFERRED TO:  Jenny العلي, MARILYN - CNP  [address]  [place] [address]  [phone]    Call in 3 days  As needed, If symptoms worsen    [facility] EMERGENCY DEPT  [address]  Go

## 2019-09-03 ENCOUNTER — APPOINTMENT (OUTPATIENT)
Dept: GENERAL RADIOLOGY | Age: 49
DRG: 202 | End: 2019-09-03
Payer: MEDICARE

## 2019-09-03 ENCOUNTER — HOSPITAL ENCOUNTER (INPATIENT)
Age: 49
LOS: 1 days | Discharge: HOME | DRG: 202 | End: 2019-09-04
Attending: INTERNAL MEDICINE | Admitting: INTERNAL MEDICINE
Payer: MEDICARE

## 2019-09-03 ENCOUNTER — CARE COORDINATION (OUTPATIENT)
Dept: CARE COORDINATION | Age: 49
End: 2019-09-03

## 2019-09-03 DIAGNOSIS — E11.65 UNCONTROLLED TYPE 2 DIABETES MELLITUS WITH HYPERGLYCEMIA, WITH LONG-TERM CURRENT USE OF INSULIN (HCC): ICD-10-CM

## 2019-09-03 DIAGNOSIS — Z79.4 UNCONTROLLED TYPE 2 DIABETES MELLITUS WITH HYPERGLYCEMIA, WITH LONG-TERM CURRENT USE OF INSULIN (HCC): ICD-10-CM

## 2019-09-03 DIAGNOSIS — J44.1 COPD EXACERBATION (HCC): Primary | ICD-10-CM

## 2019-09-03 PROBLEM — J10.1 INFLUENZA B: Status: ACTIVE | Noted: 2019-09-03

## 2019-09-03 LAB
ALBUMIN SERPL-MCNC: 3.8 G/DL (ref 3.5–5.1)
ALP BLD-CCNC: 78 U/L (ref 38–126)
ALT SERPL-CCNC: 19 U/L (ref 11–66)
ANION GAP SERPL CALCULATED.3IONS-SCNC: 14 MEQ/L (ref 8–16)
AST SERPL-CCNC: 24 U/L (ref 5–40)
BASOPHILS # BLD: 0.4 %
BASOPHILS ABSOLUTE: 0 THOU/MM3 (ref 0–0.1)
BILIRUB SERPL-MCNC: 0.3 MG/DL (ref 0.3–1.2)
BILIRUBIN DIRECT: < 0.2 MG/DL (ref 0–0.3)
BUN BLDV-MCNC: 9 MG/DL (ref 7–22)
CALCIUM SERPL-MCNC: 9 MG/DL (ref 8.5–10.5)
CHLORIDE BLD-SCNC: 95 MEQ/L (ref 98–111)
CO2: 22 MEQ/L (ref 23–33)
CREAT SERPL-MCNC: 0.7 MG/DL (ref 0.4–1.2)
EOSINOPHIL # BLD: 4.3 %
EOSINOPHILS ABSOLUTE: 0.4 THOU/MM3 (ref 0–0.4)
ERYTHROCYTE [DISTWIDTH] IN BLOOD BY AUTOMATED COUNT: 14.6 % (ref 11.5–14.5)
ERYTHROCYTE [DISTWIDTH] IN BLOOD BY AUTOMATED COUNT: 45.1 FL (ref 35–45)
GFR SERPL CREATININE-BSD FRML MDRD: > 90 ML/MIN/1.73M2
GLUCOSE BLD-MCNC: 335 MG/DL (ref 70–108)
GLUCOSE BLD-MCNC: 371 MG/DL (ref 70–108)
HCT VFR BLD CALC: 38.8 % (ref 42–52)
HEMOGLOBIN: 13.2 GM/DL (ref 14–18)
IMMATURE GRANS (ABS): 0.05 THOU/MM3 (ref 0–0.07)
IMMATURE GRANULOCYTES: 1 %
LACTIC ACID: 2.8 MMOL/L (ref 0.5–2.2)
LYMPHOCYTES # BLD: 20.3 %
LYMPHOCYTES ABSOLUTE: 1.9 THOU/MM3 (ref 1–4.8)
MCH RBC QN AUTO: 29.1 PG (ref 26–33)
MCHC RBC AUTO-ENTMCNC: 34 GM/DL (ref 32.2–35.5)
MCV RBC AUTO: 85.5 FL (ref 80–94)
MONOCYTES # BLD: 10.2 %
MONOCYTES ABSOLUTE: 0.9 THOU/MM3 (ref 0.4–1.3)
NUCLEATED RED BLOOD CELLS: 0 /100 WBC
ORGANISM: ABNORMAL
OSMOLALITY CALCULATION: 276.5 MOSMOL/KG (ref 275–300)
PLATELET # BLD: 209 THOU/MM3 (ref 130–400)
PMV BLD AUTO: 9.3 FL (ref 9.4–12.4)
POTASSIUM SERPL-SCNC: 4.3 MEQ/L (ref 3.5–5.2)
RBC # BLD: 4.54 MILL/MM3 (ref 4.7–6.1)
SEG NEUTROPHILS: 64.3 %
SEGMENTED NEUTROPHILS ABSOLUTE COUNT: 5.9 THOU/MM3 (ref 1.8–7.7)
SODIUM BLD-SCNC: 131 MEQ/L (ref 135–145)
TOTAL PROTEIN: 7.3 G/DL (ref 6.1–8)
TROPONIN T: < 0.01 NG/ML
TROPONIN T: < 0.01 NG/ML
URINE CULTURE REFLEX: ABNORMAL
WBC # BLD: 9.2 THOU/MM3 (ref 4.8–10.8)

## 2019-09-03 PROCEDURE — 94640 AIRWAY INHALATION TREATMENT: CPT

## 2019-09-03 PROCEDURE — 85025 COMPLETE CBC W/AUTO DIFF WBC: CPT

## 2019-09-03 PROCEDURE — 1200000003 HC TELEMETRY R&B

## 2019-09-03 PROCEDURE — 93010 ELECTROCARDIOGRAM REPORT: CPT | Performed by: INTERNAL MEDICINE

## 2019-09-03 PROCEDURE — 87040 BLOOD CULTURE FOR BACTERIA: CPT

## 2019-09-03 PROCEDURE — 80053 COMPREHEN METABOLIC PANEL: CPT

## 2019-09-03 PROCEDURE — 81001 URINALYSIS AUTO W/SCOPE: CPT

## 2019-09-03 PROCEDURE — 93005 ELECTROCARDIOGRAM TRACING: CPT | Performed by: INTERNAL MEDICINE

## 2019-09-03 PROCEDURE — 99223 1ST HOSP IP/OBS HIGH 75: CPT | Performed by: INTERNAL MEDICINE

## 2019-09-03 PROCEDURE — 6370000000 HC RX 637 (ALT 250 FOR IP): Performed by: INTERNAL MEDICINE

## 2019-09-03 PROCEDURE — 83605 ASSAY OF LACTIC ACID: CPT

## 2019-09-03 PROCEDURE — 6370000000 HC RX 637 (ALT 250 FOR IP)

## 2019-09-03 PROCEDURE — 87086 URINE CULTURE/COLONY COUNT: CPT

## 2019-09-03 PROCEDURE — 84484 ASSAY OF TROPONIN QUANT: CPT

## 2019-09-03 PROCEDURE — 82248 BILIRUBIN DIRECT: CPT

## 2019-09-03 PROCEDURE — 2580000003 HC RX 258: Performed by: INTERNAL MEDICINE

## 2019-09-03 PROCEDURE — 82948 REAGENT STRIP/BLOOD GLUCOSE: CPT

## 2019-09-03 PROCEDURE — 36415 COLL VENOUS BLD VENIPUNCTURE: CPT

## 2019-09-03 PROCEDURE — 71045 X-RAY EXAM CHEST 1 VIEW: CPT

## 2019-09-03 PROCEDURE — 99285 EMERGENCY DEPT VISIT HI MDM: CPT

## 2019-09-03 PROCEDURE — 6360000002 HC RX W HCPCS: Performed by: INTERNAL MEDICINE

## 2019-09-03 RX ORDER — NICOTINE POLACRILEX 4 MG
15 LOZENGE BUCCAL PRN
Status: DISCONTINUED | OUTPATIENT
Start: 2019-09-03 | End: 2019-09-04 | Stop reason: HOSPADM

## 2019-09-03 RX ORDER — SODIUM CHLORIDE 0.9 % (FLUSH) 0.9 %
10 SYRINGE (ML) INJECTION PRN
Status: DISCONTINUED | OUTPATIENT
Start: 2019-09-03 | End: 2019-09-04 | Stop reason: HOSPADM

## 2019-09-03 RX ORDER — DEXTROSE MONOHYDRATE 25 G/50ML
12.5 INJECTION, SOLUTION INTRAVENOUS PRN
Status: DISCONTINUED | OUTPATIENT
Start: 2019-09-03 | End: 2019-09-04 | Stop reason: HOSPADM

## 2019-09-03 RX ORDER — IPRATROPIUM BROMIDE AND ALBUTEROL SULFATE 2.5; .5 MG/3ML; MG/3ML
1 SOLUTION RESPIRATORY (INHALATION)
Status: DISCONTINUED | OUTPATIENT
Start: 2019-09-04 | End: 2019-09-04 | Stop reason: HOSPADM

## 2019-09-03 RX ORDER — OSELTAMIVIR PHOSPHATE 75 MG/1
CAPSULE ORAL
Status: COMPLETED
Start: 2019-09-03 | End: 2019-09-03

## 2019-09-03 RX ORDER — DEXTROSE MONOHYDRATE 50 MG/ML
100 INJECTION, SOLUTION INTRAVENOUS PRN
Status: DISCONTINUED | OUTPATIENT
Start: 2019-09-03 | End: 2019-09-04 | Stop reason: HOSPADM

## 2019-09-03 RX ORDER — IPRATROPIUM BROMIDE AND ALBUTEROL SULFATE 2.5; .5 MG/3ML; MG/3ML
1 SOLUTION RESPIRATORY (INHALATION) ONCE
Status: COMPLETED | OUTPATIENT
Start: 2019-09-03 | End: 2019-09-03

## 2019-09-03 RX ORDER — ACETAMINOPHEN 325 MG/1
650 TABLET ORAL EVERY 4 HOURS PRN
Status: DISCONTINUED | OUTPATIENT
Start: 2019-09-03 | End: 2019-09-04 | Stop reason: HOSPADM

## 2019-09-03 RX ORDER — OSELTAMIVIR PHOSPHATE 75 MG/1
75 CAPSULE ORAL 2 TIMES DAILY
Status: DISCONTINUED | OUTPATIENT
Start: 2019-09-03 | End: 2019-09-03 | Stop reason: HOSPADM

## 2019-09-03 RX ORDER — ONDANSETRON 2 MG/ML
4 INJECTION INTRAMUSCULAR; INTRAVENOUS EVERY 6 HOURS PRN
Status: DISCONTINUED | OUTPATIENT
Start: 2019-09-03 | End: 2019-09-04 | Stop reason: HOSPADM

## 2019-09-03 RX ORDER — OSELTAMIVIR PHOSPHATE 75 MG/1
75 CAPSULE ORAL 2 TIMES DAILY
Status: DISCONTINUED | OUTPATIENT
Start: 2019-09-04 | End: 2019-09-04 | Stop reason: HOSPADM

## 2019-09-03 RX ORDER — SODIUM CHLORIDE 0.9 % (FLUSH) 0.9 %
10 SYRINGE (ML) INJECTION EVERY 12 HOURS SCHEDULED
Status: DISCONTINUED | OUTPATIENT
Start: 2019-09-03 | End: 2019-09-04 | Stop reason: HOSPADM

## 2019-09-03 RX ORDER — DEXTROMETHORPHAN POLISTIREX 30 MG/5ML
60 SUSPENSION ORAL 2 TIMES DAILY PRN
Status: DISCONTINUED | OUTPATIENT
Start: 2019-09-03 | End: 2019-09-04

## 2019-09-03 RX ORDER — METHYLPREDNISOLONE SODIUM SUCCINATE 40 MG/ML
40 INJECTION, POWDER, LYOPHILIZED, FOR SOLUTION INTRAMUSCULAR; INTRAVENOUS DAILY
Status: DISCONTINUED | OUTPATIENT
Start: 2019-09-04 | End: 2019-09-04 | Stop reason: HOSPADM

## 2019-09-03 RX ORDER — INSULIN GLARGINE 100 [IU]/ML
62 INJECTION, SOLUTION SUBCUTANEOUS 2 TIMES DAILY
Status: DISCONTINUED | OUTPATIENT
Start: 2019-09-03 | End: 2019-09-04 | Stop reason: HOSPADM

## 2019-09-03 RX ADMIN — AZITHROMYCIN MONOHYDRATE 500 MG: 500 INJECTION, POWDER, LYOPHILIZED, FOR SOLUTION INTRAVENOUS at 23:01

## 2019-09-03 RX ADMIN — INSULIN GLARGINE 62 UNITS: 100 INJECTION, SOLUTION SUBCUTANEOUS at 22:56

## 2019-09-03 RX ADMIN — INSULIN LISPRO 2 UNITS: 100 INJECTION, SOLUTION INTRAVENOUS; SUBCUTANEOUS at 22:56

## 2019-09-03 RX ADMIN — OSELTAMIVIR PHOSPHATE 75 MG: 75 CAPSULE ORAL at 18:36

## 2019-09-03 RX ADMIN — IPRATROPIUM BROMIDE AND ALBUTEROL SULFATE 1 AMPULE: .5; 3 SOLUTION RESPIRATORY (INHALATION) at 17:26

## 2019-09-03 RX ADMIN — Medication 10 ML: at 23:01

## 2019-09-03 ASSESSMENT — ENCOUNTER SYMPTOMS
COUGH: 1
VOMITING: 0
DIARRHEA: 0
ABDOMINAL PAIN: 0
EYE DISCHARGE: 0
SHORTNESS OF BREATH: 1
RHINORRHEA: 0
NAUSEA: 0
BACK PAIN: 0
SORE THROAT: 0
WHEEZING: 0
EYE REDNESS: 0

## 2019-09-03 ASSESSMENT — PAIN SCALES - GENERAL
PAINLEVEL_OUTOF10: 0
PAINLEVEL_OUTOF10: 0

## 2019-09-03 ASSESSMENT — PAIN DESCRIPTION - LOCATION: LOCATION: CHEST

## 2019-09-03 ASSESSMENT — PAIN DESCRIPTION - PAIN TYPE: TYPE: ACUTE PAIN

## 2019-09-03 ASSESSMENT — PAIN DESCRIPTION - DESCRIPTORS: DESCRIPTORS: SHARP

## 2019-09-03 NOTE — ED PROVIDER NOTES
that portions of this note were completed with a voice recognition program.  Efforts were made to edit the dictations but occasionally words are mis-transcribed.)    Scribe:  Mayra Mena 9/3/19 5:04 PM Scribing for and in the presence of Lázaro Birmingham MD.    Signed by: Denia Rojas, 09/03/19 6:56 PM    Provider:  I personally performed the services described in the documentation, reviewed and edited the documentation which was dictated to the scribe in my presence, and it accurately records my words and actions.     Lázaro Birmingham MD 9/3/19 6:56 PM       Lázaro Birmingham MD  09/03/19 [id number]

## 2019-09-03 NOTE — H&P
History & Physical        Patient:  Baylee Hicks  YOB: 1970    MRN: [id number]     Acct: [de-identified]    PCP: MARILYN Fowler CNP    Date of Admission: 9/3/2019    Date of Service: Pt seen/examined on 9/3/2019   and Admitted to Inpatient with expected LOS greater than two midnights due to medical therapy. Chief Complaint:   Chief Complaint   Patient presents with    Cough    Shortness of Breath    Influenza       History Of Present Illness:      44 y.o. male who presented to [facility] with complaints of cough and shortness of breath. Patient has influenza B. Patient states that he has been coughing up green mucus. He states that he is diaphoretic and has a fever today. He was diagnosed with influenza B yesterday in the ED and was sent home with prescriptions for Tamiflu, Robitussin and throat spray. Patient return to the emergency department as he became increasingly symptomatic. Patient states that he has no chest pain. He denies nausea, vomiting, diarrhea or constipation. Past medical history includes diabetes, hypertension, GERD and AKA of the right. Patient hemodynamically stable while in emergency department. Patient afebrile with a temperature 97.8 °F. CBC essentially normal.  BMP with a sodium 131, potassium 4.3, chloride 95 and CO2 22. BUN is 9 and serum creatinine 0.7. Glucose is 371. Chest x-ray noting low lung volumes, mild central vessel fullness otherwise nonacute. Patient admitted at this time for COPD exacerbation and influenza B. Past Medical History:          Diagnosis Date    Bipolar 2 disorder [facility])     previously followed with Dr. Micaela Ren and Rachid Oquendo in [facility] Diabetes mellitus type 2, uncontrolled ([facility]     HgbA1c on 4/2/2019 was 9.1.     Diabetic polyneuropathy ([facility]     Diabetic ulcer of right foot associated with type 2 diabetes mellitus ([facility] 12/10/2015    Essential hypertension     \"never Grandfather        Diet:  ADA    REVIEW OF SYSTEMS:   Pertinent positives as noted in the HPI. All other systems reviewed and negative. PHYSICAL EXAM:    BP (!) 146/71   Pulse 110   Temp 98.2 °F (36.8 °C)   Resp 22   Ht 5' 6\" (1.676 m)   Wt 170 lb (77.1 kg)   SpO2 95%   BMI 27.44 kg/m²     General appearance:  Noted apparent distress, appears stated age and cooperative. HEENT:  Normal cephalic, atraumatic without obvious deformity. Pupils equal, round, and reactive to light. Extra ocular muscles intact. Conjunctivae/corneas clear. Neck: Supple, with full range of motion. No jugular venous distention. Trachea midline. Respiratory:  Normal respiratory effort. Clear to auscultation, bilaterally without Rales/Wheezes/Rhonchi; Decreased bases bilaterally  Cardiovascular:    S1/S2 without, rubs or gallops. Abdomen: Soft, non-tender, non-distended with normal bowel sounds. Musculoskeletal:  No clubbing, cyanosis or edema bilaterally. Full range of motion without deformity. Skin: Skin color, texture, turgor normal.  No rashes or lesions. Neurologic:  Neurovascularly intact without any focal sensory/motor deficits. Cranial nerves: II-XII intact, grossly non-focal.  Psychiatric:  Alert and oriented, thought content appropriate, normal insight  Capillary Refill: Brisk,< 3 seconds   Peripheral Pulses: +2 palpable, equal bilaterally       Labs:     Recent Labs     09/02/19 0918 09/03/19  1759   WBC 9.7 9.2   HGB 14.6 13.2*   HCT 42.8 38.8*    209     Recent Labs     09/02/19 0918 09/03/19  1759   * 131*   K 3.9 4.3   CL 93* 95*   CO2 23 22*   BUN 11 9   CREATININE 0.9 0.7   CALCIUM 9.1 9.0     Recent Labs     09/02/19  0918 09/03/19  1759   AST 20 24   ALT 17 19   BILIDIR <0.2 <0.2   BILITOT 0.3 0.3   ALKPHOS 72 78     No results for input(s): INR in the last 72 hours. No results for input(s): [place] in the last 72 hours.     Urinalysis:      Lab Results   Component Value Date

## 2019-09-04 ENCOUNTER — TELEPHONE (OUTPATIENT)
Dept: INTERNAL MEDICINE CLINIC | Age: 49
End: 2019-09-04

## 2019-09-04 VITALS
SYSTOLIC BLOOD PRESSURE: 148 MMHG | TEMPERATURE: 98.4 F | OXYGEN SATURATION: 99 % | WEIGHT: 220 LBS | RESPIRATION RATE: 17 BRPM | HEART RATE: 105 BPM | DIASTOLIC BLOOD PRESSURE: 72 MMHG | HEIGHT: 66 IN | BODY MASS INDEX: 35.36 KG/M2

## 2019-09-04 LAB
ANION GAP SERPL CALCULATED.3IONS-SCNC: 13 MEQ/L (ref 8–16)
BACTERIA: ABNORMAL /HPF
BILIRUBIN URINE: NEGATIVE
BLOOD, URINE: NEGATIVE
BUN BLDV-MCNC: 9 MG/DL (ref 7–22)
CALCIUM SERPL-MCNC: 9.1 MG/DL (ref 8.5–10.5)
CASTS 2: ABNORMAL /LPF
CASTS UA: ABNORMAL /LPF
CHARACTER, URINE: CLEAR
CHLORIDE BLD-SCNC: 100 MEQ/L (ref 98–111)
CO2: 22 MEQ/L (ref 23–33)
COLOR: YELLOW
CREAT SERPL-MCNC: 0.8 MG/DL (ref 0.4–1.2)
CRYSTALS, UA: ABNORMAL
EKG ATRIAL RATE: 98 BPM
EKG P AXIS: 42 DEGREES
EKG P-R INTERVAL: 176 MS
EKG Q-T INTERVAL: 348 MS
EKG QRS DURATION: 78 MS
EKG QTC CALCULATION (BAZETT): 444 MS
EKG R AXIS: 10 DEGREES
EKG T AXIS: 42 DEGREES
EKG VENTRICULAR RATE: 98 BPM
EPITHELIAL CELLS, UA: ABNORMAL /HPF
ERYTHROCYTE [DISTWIDTH] IN BLOOD BY AUTOMATED COUNT: 14.7 % (ref 11.5–14.5)
ERYTHROCYTE [DISTWIDTH] IN BLOOD BY AUTOMATED COUNT: 45.2 FL (ref 35–45)
GFR SERPL CREATININE-BSD FRML MDRD: > 90 ML/MIN/1.73M2
GLUCOSE BLD-MCNC: 264 MG/DL (ref 70–108)
GLUCOSE BLD-MCNC: 271 MG/DL (ref 70–108)
GLUCOSE BLD-MCNC: 382 MG/DL (ref 70–108)
GLUCOSE URINE: >= 1000 MG/DL
HCT VFR BLD CALC: 38.1 % (ref 42–52)
HEMOGLOBIN: 13.1 GM/DL (ref 14–18)
KETONES, URINE: NEGATIVE
LEUKOCYTE ESTERASE, URINE: ABNORMAL
MCH RBC QN AUTO: 29.2 PG (ref 26–33)
MCHC RBC AUTO-ENTMCNC: 34.4 GM/DL (ref 32.2–35.5)
MCV RBC AUTO: 84.9 FL (ref 80–94)
MISCELLANEOUS 2: ABNORMAL
NITRITE, URINE: NEGATIVE
PH UA: 5.5 (ref 5–9)
PLATELET # BLD: 202 THOU/MM3 (ref 130–400)
PMV BLD AUTO: 9.2 FL (ref 9.4–12.4)
POTASSIUM SERPL-SCNC: 4 MEQ/L (ref 3.5–5.2)
PROTEIN UA: NEGATIVE
RBC # BLD: 4.49 MILL/MM3 (ref 4.7–6.1)
RBC URINE: ABNORMAL /HPF
RENAL EPITHELIAL, UA: ABNORMAL
SODIUM BLD-SCNC: 135 MEQ/L (ref 135–145)
SPECIFIC GRAVITY, URINE: 1.02 (ref 1–1.03)
THROAT/NOSE CULTURE: NORMAL
TROPONIN T: < 0.01 NG/ML
UROBILINOGEN, URINE: 0.2 EU/DL (ref 0–1)
WBC # BLD: 10.4 THOU/MM3 (ref 4.8–10.8)
WBC UA: ABNORMAL /HPF
YEAST: ABNORMAL

## 2019-09-04 PROCEDURE — 80048 BASIC METABOLIC PNL TOTAL CA: CPT

## 2019-09-04 PROCEDURE — 93010 ELECTROCARDIOGRAM REPORT: CPT | Performed by: INTERNAL MEDICINE

## 2019-09-04 PROCEDURE — 2580000003 HC RX 258: Performed by: INTERNAL MEDICINE

## 2019-09-04 PROCEDURE — 85027 COMPLETE CBC AUTOMATED: CPT

## 2019-09-04 PROCEDURE — 99238 HOSP IP/OBS DSCHRG MGMT 30/<: CPT | Performed by: INTERNAL MEDICINE

## 2019-09-04 PROCEDURE — 94640 AIRWAY INHALATION TREATMENT: CPT

## 2019-09-04 PROCEDURE — 6360000002 HC RX W HCPCS: Performed by: INTERNAL MEDICINE

## 2019-09-04 PROCEDURE — 87449 NOS EACH ORGANISM AG IA: CPT

## 2019-09-04 PROCEDURE — 6370000000 HC RX 637 (ALT 250 FOR IP): Performed by: INTERNAL MEDICINE

## 2019-09-04 PROCEDURE — 6370000000 HC RX 637 (ALT 250 FOR IP): Performed by: PHYSICIAN ASSISTANT

## 2019-09-04 PROCEDURE — 84484 ASSAY OF TROPONIN QUANT: CPT

## 2019-09-04 PROCEDURE — 36415 COLL VENOUS BLD VENIPUNCTURE: CPT

## 2019-09-04 PROCEDURE — 87899 AGENT NOS ASSAY W/OPTIC: CPT

## 2019-09-04 PROCEDURE — 93005 ELECTROCARDIOGRAM TRACING: CPT | Performed by: INTERNAL MEDICINE

## 2019-09-04 PROCEDURE — 82948 REAGENT STRIP/BLOOD GLUCOSE: CPT

## 2019-09-04 RX ORDER — ALBUTEROL SULFATE 90 UG/1
1 AEROSOL, METERED RESPIRATORY (INHALATION) EVERY 6 HOURS PRN
Qty: 1 INHALER | Refills: 3 | Status: SHIPPED | OUTPATIENT
Start: 2019-09-04 | End: 2020-01-28

## 2019-09-04 RX ORDER — BENZONATATE 100 MG/1
100 CAPSULE ORAL 3 TIMES DAILY PRN
Status: DISCONTINUED | OUTPATIENT
Start: 2019-09-04 | End: 2019-09-04 | Stop reason: HOSPADM

## 2019-09-04 RX ORDER — GABAPENTIN 300 MG/1
300 CAPSULE ORAL 2 TIMES DAILY
Status: ON HOLD | COMMUNITY
End: 2019-10-17 | Stop reason: HOSPADM

## 2019-09-04 RX ORDER — PREDNISONE 10 MG/1
40 TABLET ORAL DAILY
Qty: 8 TABLET | Refills: 0 | Status: SHIPPED | OUTPATIENT
Start: 2019-09-04 | End: 2019-09-06

## 2019-09-04 RX ORDER — GUAIFENESIN/DEXTROMETHORPHAN 100-10MG/5
10 SYRUP ORAL EVERY 4 HOURS PRN
Status: DISCONTINUED | OUTPATIENT
Start: 2019-09-04 | End: 2019-09-04 | Stop reason: HOSPADM

## 2019-09-04 RX ORDER — AZITHROMYCIN 250 MG/1
250 TABLET, FILM COATED ORAL DAILY
Qty: 4 TABLET | Refills: 0 | Status: SHIPPED | OUTPATIENT
Start: 2019-09-04 | End: 2019-09-08

## 2019-09-04 RX ORDER — OSELTAMIVIR PHOSPHATE 75 MG/1
75 CAPSULE ORAL 2 TIMES DAILY
Qty: 8 CAPSULE | Refills: 0 | Status: SHIPPED | OUTPATIENT
Start: 2019-09-04 | End: 2019-09-08

## 2019-09-04 RX ORDER — LEVALBUTEROL TARTRATE 45 UG/1
1 AEROSOL, METERED ORAL EVERY 6 HOURS PRN
Status: DISCONTINUED | OUTPATIENT
Start: 2019-09-04 | End: 2019-09-04 | Stop reason: CLARIF

## 2019-09-04 RX ORDER — ALBUTEROL SULFATE 90 UG/1
1 AEROSOL, METERED RESPIRATORY (INHALATION) EVERY 6 HOURS PRN
Status: DISCONTINUED | OUTPATIENT
Start: 2019-09-04 | End: 2019-09-04 | Stop reason: HOSPADM

## 2019-09-04 RX ADMIN — Medication 1 PUFF: at 01:35

## 2019-09-04 RX ADMIN — BENZONATATE 100 MG: 100 CAPSULE ORAL at 02:06

## 2019-09-04 RX ADMIN — IPRATROPIUM BROMIDE AND ALBUTEROL SULFATE 1 AMPULE: .5; 3 SOLUTION RESPIRATORY (INHALATION) at 09:52

## 2019-09-04 RX ADMIN — INSULIN LISPRO 22 UNITS: 100 INJECTION, SOLUTION INTRAVENOUS; SUBCUTANEOUS at 11:30

## 2019-09-04 RX ADMIN — SERTRALINE 150 MG: 100 TABLET, FILM COATED ORAL at 08:40

## 2019-09-04 RX ADMIN — Medication 1 PUFF: at 14:09

## 2019-09-04 RX ADMIN — INSULIN LISPRO 5 UNITS: 100 INJECTION, SOLUTION INTRAVENOUS; SUBCUTANEOUS at 11:32

## 2019-09-04 RX ADMIN — ACETAMINOPHEN 650 MG: 325 TABLET ORAL at 11:32

## 2019-09-04 RX ADMIN — OSELTAMIVIR PHOSPHATE 75 MG: 75 CAPSULE ORAL at 08:41

## 2019-09-04 RX ADMIN — BENZONATATE 100 MG: 100 CAPSULE ORAL at 08:41

## 2019-09-04 RX ADMIN — INSULIN LISPRO 3 UNITS: 100 INJECTION, SOLUTION INTRAVENOUS; SUBCUTANEOUS at 08:34

## 2019-09-04 RX ADMIN — METHYLPREDNISOLONE SODIUM SUCCINATE 40 MG: 40 INJECTION, POWDER, FOR SOLUTION INTRAMUSCULAR; INTRAVENOUS at 08:41

## 2019-09-04 RX ADMIN — ENOXAPARIN SODIUM 40 MG: 40 INJECTION SUBCUTANEOUS at 08:40

## 2019-09-04 RX ADMIN — INSULIN GLARGINE 62 UNITS: 100 INJECTION, SOLUTION SUBCUTANEOUS at 08:33

## 2019-09-04 RX ADMIN — Medication 10 ML: at 08:42

## 2019-09-04 RX ADMIN — INSULIN LISPRO 22 UNITS: 100 INJECTION, SOLUTION INTRAVENOUS; SUBCUTANEOUS at 08:39

## 2019-09-04 ASSESSMENT — PAIN SCALES - GENERAL
PAINLEVEL_OUTOF10: 0
PAINLEVEL_OUTOF10: 0
PAINLEVEL_OUTOF10: 7

## 2019-09-04 NOTE — PROGRESS NOTES
Discharge teaching and instructions for diagnosis/procedure of COPD exacerbation completed with patient using teachback method. AVS reviewed. Printed prescriptions given to patient. Patient voiced understanding regarding prescriptions, follow up appointments, and care of self at home.  Discharged in a wheelchair to  home with support per friend

## 2019-09-04 NOTE — CARE COORDINATION
9/4/19, 2:22 PM      Henrique Mesa       Admitted from: ER 9/3/2019/ [address]  day: 1   Location: [facility]/027-A Reason for admit: COPD exacerbation ([facility] [J44.1] Status: Inpt  Admit order signed?: yes  PMH:  has a past medical history of Bipolar 2 disorder ([facility] Diabetes mellitus type 2, uncontrolled ([facility] Diabetic polyneuropathy ([facility] Diabetic ulcer of right foot associated with type 2 diabetes mellitus ([facility] Essential hypertension, GERD (gastroesophageal reflux disease), Hammer toe of left foot, Heart murmur, History of tobacco abuse, Hx of AKA (above knee amputation), right ([facility] Macular edema, diabetic, bilateral ([facility] Marijuana abuse in remission, Onychomycosis, Other disorders of kidney and ureter in diseases classified elsewhere, and WD-Skin ulcer of fourth toe of right foot with necrosis of bone ([facility] Procedure: No  Pertinent abnormal Imaging:No  Medications:  Scheduled Meds:   sodium chloride flush  10 mL Intravenous 2 times per day    enoxaparin  40 mg Subcutaneous Daily    insulin lispro  0-6 Units Subcutaneous TID WC    insulin lispro  0-3 Units Subcutaneous Nightly    insulin lispro  22 Units Subcutaneous TID WC    insulin glargine  62 Units Subcutaneous BID    oseltamivir  75 mg Oral BID    sertraline  150 mg Oral Daily    ipratropium-albuterol  1 ampule Inhalation Q4H WA    methylPREDNISolone  40 mg Intravenous Daily    azithromycin  500 mg Intravenous Q24H     Continuous Infusions:   dextrose        Pertinent Info/Orders/Treatment Plan: Presented to ED for sob, sore throat, cough, body aches. Hx AKA, DM Htn. Telemetry. IV Solumedrol and Zithromax. Breathing treatments. Manage diabetes. Diet: DIET CARB CONTROL;   Smoking status:  reports that he quit smoking about 34 years ago. His smoking use included cigars. He has a 65.00 pack-year smoking history.  He has never used smokeless tobacco.   PCP: MARILYN Burris - CNP  Readmission: No  Readmission Risk Score:

## 2019-09-04 NOTE — PLAN OF CARE
Problem: Falls - Risk of:  Goal: Will remain free from falls  Description  Will remain free from falls  Outcome: Ongoing  Call light within reach. Side rails up x2. Bed alarm on. Non skid slippers available. Problem: Falls - Risk of:  Goal: Absence of physical injury  Description  Absence of physical injury  Outcome: Ongoing    Problem: Risk for Impaired Skin Integrity  Goal: Tissue integrity - skin and mucous membranes  Description  Structural intactness and normal physiological function of skin and  mucous membranes. Outcome: Ongoing  Patient free from skin breakdown. Patient turns self and makes frequent positional changes. Will continue to monitor. Problem: Discharge Planning:  Goal: Discharged to appropriate level of care  Description  Discharged to appropriate level of care  Outcome: Ongoing  Patient plans to be discharged home when medically stable. Patient included in discharge planning. Problem: Pain:  Goal: Pain level will decrease  Description  Pain level will decrease  Outcome: Ongoing  Patient free from pain this shift. Pain rated on 0-10 pain rating scale. Patient's pain goal is a 3/10. Will continue to reassess. Care plan reviewed with patient. Patient verbalize understanding of the plan of care and contribute to goal setting.

## 2019-09-04 NOTE — PLAN OF CARE
Problem: Falls - Risk of:  Goal: Will remain free from falls  Description  Will remain free from falls  9/4/2019 1255 by Farrukh Roth RN  Outcome: Ongoing  Note:   Call light within reach. Side rails up x2. Bed alarm on. Non skid slippers available. Problem: Risk for Impaired Skin Integrity  Goal: Tissue integrity - skin and mucous membranes  Description  Structural intactness and normal physiological function of skin and  mucous membranes. 9/4/2019 1255 by Farrukh Roth RN  Outcome: Ongoing  Note:   Patient free from skin breakdown. Patient turns self and makes frequent positional changes. Will continue to monitor. Problem: Discharge Planning:  Goal: Discharged to appropriate level of care  Description  Discharged to appropriate level of care  9/4/2019 1255 by Farrukh Roth RN  Outcome: Ongoing  Note:   Patient plans to be discharged to home when medically stable. Problem: Pain:  Goal: Pain level will decrease  Description  Pain level will decrease  9/4/2019 1255 by Farrukh Roth RN  Outcome: Ongoing  Note:   Patient free from pain this shift. Pain rated on 0-10 pain rating scale. Will continue to reassess. Care plan reviewed with patient. Patient verbalize understanding of the plan of care and contribute to goal setting.

## 2019-09-04 NOTE — DISCHARGE SUMMARY
increased by PCP as per the patient. Once the steroids were given patient's sugars were trending up to the 300s. Patient denied any COPD diagnosis in the past and I highly suspect the patient has bronchitis with bronchospasm secondary to influenza B and was very quick to improve and wheezing has resolved completely and is not needing any oxygen and is being discharged home to complete the course of Tamiflu and also 2 more days of steroids and empiric antibiotics because of suspicion for possible secondary bacterial bronchitis. Patient improved rapidly than anticipated. Did recommend the patient to monitor his blood sugar closely and follow with PCP. will give albuterol inhaler for pRN use. Exam:     Vitals:  Vitals:    09/03/19 2345 09/04/19 0515 09/04/19 0832 09/04/19 1123   BP: (!) 133/58 128/60 128/71 (!) 148/72   Pulse: 115 106 98 105   Resp: 18 18 18 17   Temp: 98.1 °F (36.7 °C) 97.6 °F (36.4 °C) 97.9 °F (36.6 °C) 98.4 °F (36.9 °C)   TempSrc: Oral Oral Oral Oral   SpO2: 97% 99% 99% 99%   Weight:       Height:         Weight: Weight: 220 lb (99.8 kg)     24 hour intake/output:    Intake/Output Summary (Last 24 hours) at 9/4/2019 1439  Last data filed at 9/4/2019 1250  Gross per 24 hour   Intake 1690.68 ml   Output 500 ml   Net 1190.68 ml       Physical Examination:   General appearance - alert, awake  and in no distress, has cough  HEENT: Normocephalic, Atraumatic, pupils reactive, mucous membranes moist  Chest - moving equally to respiration,symmetric air entry,fairly clear to auscultation  Heart - normal rate, regular rhythm, normal S1, S2, no murmurs,  Abdomen - soft, nontender, distended, no masses or organomegaly, BS+  Neurological - alert, oriented, normal speech, sensations intact and able to move all extremities   Extremities - peripheral pulses normal,, right BKA+, no pedal edema,  Capillary refill less than 3 sec  Skin - normal coloration and turgor, no rashes      Labs:  For convenience and continuity at follow-up the following most recent labs are provided:      CBC:    Lab Results   Component Value Date    WBC 10.4 09/04/2019    HGB 13.1 09/04/2019    HCT 38.1 09/04/2019     09/04/2019       Renal:    Lab Results   Component Value Date     09/04/2019    K 4.0 09/04/2019    K 4.2 04/03/2019     09/04/2019    CO2 22 09/04/2019    BUN 9 09/04/2019    CREATININE 0.8 09/04/2019    CALCIUM 9.1 09/04/2019    PHOS 2.6 02/16/2019         Significant Diagnostic Studies    Radiology:   XR CHEST PORTABLE   Final Result   Low lung volumes. Mild central vessel fullness, correlate for pulmonary vascular congestion. **This report has been created using voice recognition software. It may contain minor errors which are inherent in voice recognition technology. **      Final report electronically signed by Dr. Art Seay on 9/3/2019 6:20 PM             Consults:     None    Disposition: Home  Condition at Discharge: Stable    Code Status:  Full Code     Patient Instructions:    Discharge lab work: none  Activity: activity as tolerated  Diet: DIET CARB CONTROL;       Follow-up visits:   Black Nolasco, APRN - CNP  [address]  [facility] [address] [phone]               Discharge Medications:      Abigail Zamora   Home Medication Instructions [id number]    Printed on:09/04/19 1439   Medication Information                      acetaminophen (APAP EXTRA STRENGTH) 500 MG tablet  Take 1 tablet by mouth every 6 hours as needed for Pain             albuterol sulfate  (90 Base) MCG/ACT inhaler  Inhale 1 puff into the lungs every 6 hours as needed for Wheezing             azithromycin (ZITHROMAX) 250 MG tablet  Take 1 tablet by mouth daily for 4 days             blood glucose monitor kit and supplies  Use to test blood sugars DX:E11.65             blood glucose monitor strips  Accucheck Sheila Test Strips Test blood sugars 4 times daily DX:E11.65

## 2019-09-04 NOTE — PROGRESS NOTES
Pt admitted to  6K27 from ED. Complaints: flu symptoms  IV none infusing into the hand right, condition patent and no redness. IV site free of s/s of infection or infiltration. Vital signs obtained. Assessment and data collection initiated. Two nurse skin [facility] and Stephanie Borrero RN. Oriented to room. Policies and procedures for 6K explained. All questions answered with no further questions at this time. Fall prevention and safety brochure discussed with patient. Bed alarm on. Call light in reach. The best day to schedule a follow up Dr appointment is: Wednesday p.m.

## 2019-09-05 ENCOUNTER — CARE COORDINATION (OUTPATIENT)
Dept: CASE MANAGEMENT | Age: 49
End: 2019-09-05

## 2019-09-05 ENCOUNTER — CARE COORDINATION (OUTPATIENT)
Dept: CARE COORDINATION | Age: 49
End: 2019-09-05

## 2019-09-05 LAB
EKG ATRIAL RATE: 91 BPM
EKG P AXIS: 56 DEGREES
EKG P-R INTERVAL: 180 MS
EKG Q-T INTERVAL: 352 MS
EKG QRS DURATION: 80 MS
EKG QTC CALCULATION (BAZETT): 432 MS
EKG R AXIS: 9 DEGREES
EKG T AXIS: 35 DEGREES
EKG VENTRICULAR RATE: 91 BPM
ORGANISM: ABNORMAL
STREP PNEUMO AG, UR: NEGATIVE
URINE CULTURE REFLEX: ABNORMAL

## 2019-09-05 PROCEDURE — 93010 ELECTROCARDIOGRAM REPORT: CPT | Performed by: INTERNAL MEDICINE

## 2019-09-05 ASSESSMENT — ENCOUNTER SYMPTOMS: DYSPNEA ASSOCIATED WITH: EXERTION

## 2019-09-05 NOTE — CARE COORDINATION
Pt was seen by PCP on 8/26. BS's continue to be elevated. Lantus and Novolog were increased and pt was to call in every 3 days to report BS's for further insulin adjustments to work towards getting BS's below 200. Pt recent dx of influenza. Admitted to hospital. [facility] 9/3 [phone]. BS yesterday evening-329.  9/5 AM: 375. Pt has not started prednisone. Once obtains scripts from pharmacy he will start. Concerned BS's will continue to climb. Current dosing is Lantus 62 units twice daily and Novolog 22 units with meals. Please advise.

## 2019-09-06 ENCOUNTER — CARE COORDINATION (OUTPATIENT)
Dept: CASE MANAGEMENT | Age: 49
End: 2019-09-06

## 2019-09-06 LAB — LEGIONELLA URINARY AG: NEGATIVE

## 2019-09-06 NOTE — CARE COORDINATION
Lanie [facility] Transitions Initial Follow Up Call    Call within 2 business days of discharge: Yes    Patient: Brendan Richard Patient : 1970   MRN: [id number]  Reason for Admission: COPD  Discharge Date: 19 RARS: Readmission Risk Score: 52      Last Discharge [facility]       Complaint Diagnosis Description Type Department Provider    9/3/19 Cough; Shortness of Breath; Influenza COPD exacerbation ([facility] . .. ED to Hosp-Admission (Discharged) (ADMITTED) BLANK 6K Najma Pablo DO; Lázaro Birmingham,. ..           2nd attempt for 24 hour call. Left message to return call. Will move patient on to ACM as well.     Care Transitions 24 Hour Call    Do you have all of your prescriptions and are they filled?:  Yes  Patient DME:  Shower chair, Walker, Wheelchair, Other  Other Patient DME:  Entrance Ramp  Do you have support at home?:  Other Caregiver  Are you an active caregiver in your home?:  No  Care Transitions Interventions         Follow Up  Future Appointments   Date Time Provider Ebenezer Carmichael   2019 11:40 AM MARILYN Burns CNP [facility] [facility]   10/8/2019 10:00 AM Mere Downs RN [address] [facility]   10/21/2019  1:00 PM MARILYN Richardson CNP [facility] 88 Naila Wilder RN

## 2019-09-07 LAB
BLOOD CULTURE, ROUTINE: NORMAL
BLOOD CULTURE, ROUTINE: NORMAL

## 2019-09-09 LAB
BLOOD CULTURE, ROUTINE: NORMAL
BLOOD CULTURE, ROUTINE: NORMAL

## 2019-09-09 NOTE — CARE COORDINATION
Patient has been in contact with Crista Centeno following ED visit. I will not contact any further at this time.

## 2019-09-11 ENCOUNTER — CARE COORDINATION (OUTPATIENT)
Dept: CARE COORDINATION | Age: 49
End: 2019-09-11

## 2019-09-18 ENCOUNTER — CARE COORDINATION (OUTPATIENT)
Dept: CARE COORDINATION | Age: 49
End: 2019-09-18

## 2019-09-19 ENCOUNTER — CARE COORDINATION (OUTPATIENT)
Dept: CARE COORDINATION | Age: 49
End: 2019-09-19

## 2019-09-24 ENCOUNTER — APPOINTMENT (OUTPATIENT)
Dept: INTERVENTIONAL RADIOLOGY/VASCULAR | Age: 49
End: 2019-09-24
Payer: MEDICARE

## 2019-09-24 ENCOUNTER — OFFICE VISIT (OUTPATIENT)
Dept: INTERNAL MEDICINE CLINIC | Age: 49
End: 2019-09-24
Payer: MEDICARE

## 2019-09-24 ENCOUNTER — HOSPITAL ENCOUNTER (EMERGENCY)
Age: 49
Discharge: HOME | End: 2019-09-24
Payer: MEDICARE

## 2019-09-24 VITALS
WEIGHT: 220 LBS | HEIGHT: 66 IN | DIASTOLIC BLOOD PRESSURE: 80 MMHG | BODY MASS INDEX: 35.36 KG/M2 | OXYGEN SATURATION: 99 % | SYSTOLIC BLOOD PRESSURE: 120 MMHG | HEART RATE: 108 BPM

## 2019-09-24 VITALS
RESPIRATION RATE: 18 BRPM | BODY MASS INDEX: 35.36 KG/M2 | HEART RATE: 107 BPM | OXYGEN SATURATION: 99 % | SYSTOLIC BLOOD PRESSURE: 130 MMHG | HEIGHT: 66 IN | WEIGHT: 220 LBS | TEMPERATURE: 98.3 F | DIASTOLIC BLOOD PRESSURE: 78 MMHG

## 2019-09-24 DIAGNOSIS — R05.9 COUGH: ICD-10-CM

## 2019-09-24 DIAGNOSIS — L03.116 CELLULITIS OF LEFT LOWER EXTREMITY: Primary | ICD-10-CM

## 2019-09-24 DIAGNOSIS — E11.65 TYPE 2 DIABETES MELLITUS WITH HYPERGLYCEMIA, WITH LONG-TERM CURRENT USE OF INSULIN (HCC): ICD-10-CM

## 2019-09-24 DIAGNOSIS — Z79.4 UNCONTROLLED TYPE 2 DIABETES MELLITUS WITH HYPERGLYCEMIA, WITH LONG-TERM CURRENT USE OF INSULIN (HCC): Primary | ICD-10-CM

## 2019-09-24 DIAGNOSIS — Z79.4 TYPE 2 DIABETES MELLITUS WITH HYPERGLYCEMIA, WITH LONG-TERM CURRENT USE OF INSULIN (HCC): ICD-10-CM

## 2019-09-24 DIAGNOSIS — E11.65 UNCONTROLLED TYPE 2 DIABETES MELLITUS WITH HYPERGLYCEMIA, WITH LONG-TERM CURRENT USE OF INSULIN (HCC): Primary | ICD-10-CM

## 2019-09-24 DIAGNOSIS — M79.89 LEFT LEG SWELLING: ICD-10-CM

## 2019-09-24 LAB
ALBUMIN SERPL-MCNC: 4.5 G/DL (ref 3.5–5.1)
ALP BLD-CCNC: 74 U/L (ref 38–126)
ALT SERPL-CCNC: 21 U/L (ref 11–66)
ANION GAP SERPL CALCULATED.3IONS-SCNC: 17 MEQ/L (ref 8–16)
APTT: 34.8 SECONDS (ref 22–38)
AST SERPL-CCNC: 17 U/L (ref 5–40)
BASOPHILS # BLD: 0.8 %
BASOPHILS ABSOLUTE: 0.1 THOU/MM3 (ref 0–0.1)
BILIRUB SERPL-MCNC: 0.4 MG/DL (ref 0.3–1.2)
BUN BLDV-MCNC: 14 MG/DL (ref 7–22)
CALCIUM SERPL-MCNC: 10.2 MG/DL (ref 8.5–10.5)
CHLORIDE BLD-SCNC: 92 MEQ/L (ref 98–111)
CHP ED QC CHECK: ABNORMAL
CO2: 24 MEQ/L (ref 23–33)
CREAT SERPL-MCNC: 0.8 MG/DL (ref 0.4–1.2)
EOSINOPHIL # BLD: 4.7 %
EOSINOPHILS ABSOLUTE: 0.5 THOU/MM3 (ref 0–0.4)
ERYTHROCYTE [DISTWIDTH] IN BLOOD BY AUTOMATED COUNT: 14.6 % (ref 11.5–14.5)
ERYTHROCYTE [DISTWIDTH] IN BLOOD BY AUTOMATED COUNT: 44.5 FL (ref 35–45)
GFR SERPL CREATININE-BSD FRML MDRD: > 90 ML/MIN/1.73M2
GLUCOSE BLD-MCNC: 353 MG/DL (ref 70–108)
GLUCOSE BLD-MCNC: 484 MG/DL (ref 70–108)
GLUCOSE BLD-MCNC: 497 MG/DL
HCT VFR BLD CALC: 46.5 % (ref 42–52)
HEMOGLOBIN: 16.1 GM/DL (ref 14–18)
IMMATURE GRANS (ABS): 0.07 THOU/MM3 (ref 0–0.07)
IMMATURE GRANULOCYTES: 0.7 %
INR BLD: 0.93 (ref 0.85–1.13)
LACTIC ACID: 2.8 MMOL/L (ref 0.5–2.2)
LYMPHOCYTES # BLD: 21.7 %
LYMPHOCYTES ABSOLUTE: 2.1 THOU/MM3 (ref 1–4.8)
MCH RBC QN AUTO: 29.3 PG (ref 26–33)
MCHC RBC AUTO-ENTMCNC: 34.6 GM/DL (ref 32.2–35.5)
MCV RBC AUTO: 84.7 FL (ref 80–94)
MONOCYTES # BLD: 6.7 %
MONOCYTES ABSOLUTE: 0.7 THOU/MM3 (ref 0.4–1.3)
NUCLEATED RED BLOOD CELLS: 0 /100 WBC
OSMOLALITY CALCULATION: 288.3 MOSMOL/KG (ref 275–300)
PLATELET # BLD: 261 THOU/MM3 (ref 130–400)
PMV BLD AUTO: 9.4 FL (ref 9.4–12.4)
POTASSIUM SERPL-SCNC: 4.6 MEQ/L (ref 3.5–5.2)
RBC # BLD: 5.49 MILL/MM3 (ref 4.7–6.1)
SEG NEUTROPHILS: 65.4 %
SEGMENTED NEUTROPHILS ABSOLUTE COUNT: 6.4 THOU/MM3 (ref 1.8–7.7)
SODIUM BLD-SCNC: 133 MEQ/L (ref 135–145)
TOTAL PROTEIN: 8.5 G/DL (ref 6.1–8)
TROPONIN T: < 0.01 NG/ML
WBC # BLD: 9.8 THOU/MM3 (ref 4.8–10.8)

## 2019-09-24 PROCEDURE — 36415 COLL VENOUS BLD VENIPUNCTURE: CPT

## 2019-09-24 PROCEDURE — 99214 OFFICE O/P EST MOD 30 MIN: CPT | Performed by: NURSE PRACTITIONER

## 2019-09-24 PROCEDURE — 85610 PROTHROMBIN TIME: CPT

## 2019-09-24 PROCEDURE — 93971 EXTREMITY STUDY: CPT

## 2019-09-24 PROCEDURE — 3017F COLORECTAL CA SCREEN DOC REV: CPT | Performed by: NURSE PRACTITIONER

## 2019-09-24 PROCEDURE — 82962 GLUCOSE BLOOD TEST: CPT | Performed by: NURSE PRACTITIONER

## 2019-09-24 PROCEDURE — 87040 BLOOD CULTURE FOR BACTERIA: CPT

## 2019-09-24 PROCEDURE — 6370000000 HC RX 637 (ALT 250 FOR IP): Performed by: NURSE PRACTITIONER

## 2019-09-24 PROCEDURE — 85025 COMPLETE CBC W/AUTO DIFF WBC: CPT

## 2019-09-24 PROCEDURE — 82948 REAGENT STRIP/BLOOD GLUCOSE: CPT

## 2019-09-24 PROCEDURE — G8427 DOCREV CUR MEDS BY ELIG CLIN: HCPCS | Performed by: NURSE PRACTITIONER

## 2019-09-24 PROCEDURE — G8417 CALC BMI ABV UP PARAM F/U: HCPCS | Performed by: NURSE PRACTITIONER

## 2019-09-24 PROCEDURE — 96360 HYDRATION IV INFUSION INIT: CPT

## 2019-09-24 PROCEDURE — 99284 EMERGENCY DEPT VISIT MOD MDM: CPT

## 2019-09-24 PROCEDURE — 1036F TOBACCO NON-USER: CPT | Performed by: NURSE PRACTITIONER

## 2019-09-24 PROCEDURE — 3046F HEMOGLOBIN A1C LEVEL >9.0%: CPT | Performed by: NURSE PRACTITIONER

## 2019-09-24 PROCEDURE — 1111F DSCHRG MED/CURRENT MED MERGE: CPT | Performed by: NURSE PRACTITIONER

## 2019-09-24 PROCEDURE — 2022F DILAT RTA XM EVC RTNOPTHY: CPT | Performed by: NURSE PRACTITIONER

## 2019-09-24 PROCEDURE — 85730 THROMBOPLASTIN TIME PARTIAL: CPT

## 2019-09-24 PROCEDURE — 2580000003 HC RX 258: Performed by: NURSE PRACTITIONER

## 2019-09-24 PROCEDURE — 83605 ASSAY OF LACTIC ACID: CPT

## 2019-09-24 PROCEDURE — 80053 COMPREHEN METABOLIC PANEL: CPT

## 2019-09-24 PROCEDURE — 84484 ASSAY OF TROPONIN QUANT: CPT

## 2019-09-24 RX ORDER — CEPHALEXIN 500 MG/1
500 CAPSULE ORAL 4 TIMES DAILY
Qty: 28 CAPSULE | Refills: 0 | Status: SHIPPED | OUTPATIENT
Start: 2019-09-24 | End: 2019-10-01

## 2019-09-24 RX ORDER — 0.9 % SODIUM CHLORIDE 0.9 %
1000 INTRAVENOUS SOLUTION INTRAVENOUS ONCE
Status: COMPLETED | OUTPATIENT
Start: 2019-09-24 | End: 2019-09-24

## 2019-09-24 RX ADMIN — Medication 8 UNITS: at 16:57

## 2019-09-24 RX ADMIN — SODIUM CHLORIDE 1000 ML: 9 INJECTION, SOLUTION INTRAVENOUS at 16:49

## 2019-09-24 ASSESSMENT — PAIN SCALES - GENERAL: PAINLEVEL_OUTOF10: 8

## 2019-09-24 ASSESSMENT — PAIN DESCRIPTION - LOCATION: LOCATION: LEG

## 2019-09-24 ASSESSMENT — ENCOUNTER SYMPTOMS
ABDOMINAL PAIN: 0
CHEST TIGHTNESS: 1
NAUSEA: 0
SORE THROAT: 0
RHINORRHEA: 0
SHORTNESS OF BREATH: 1
WHEEZING: 0
PHOTOPHOBIA: 0
COLOR CHANGE: 1
COUGH: 1
SHORTNESS OF BREATH: 0
DIARRHEA: 0
CONSTIPATION: 0
TROUBLE SWALLOWING: 0
VOMITING: 0
DIARRHEA: 0
NAUSEA: 0
CHEST TIGHTNESS: 1
ABDOMINAL PAIN: 0
COUGH: 0
VOMITING: 0

## 2019-09-24 ASSESSMENT — PAIN DESCRIPTION - PAIN TYPE: TYPE: ACUTE PAIN

## 2019-09-24 NOTE — ED PROVIDER NOTES
[facility] Emergency Department    CHIEF COMPLAINT       Chief Complaint   Patient presents with    Hyperglycemia    Cellulitis     possible       Nurses Notes reviewed and I agree except as noted in the HPI. HISTORY OF PRESENT ILLNESS    Amara Quinn derrick 44 y.o. male who presents to the ED for evaluation of hyperglycemia with possible infection in his left leg. He was visiting his PCP Mya Witt and POC glucose was 497, he was then advised come go to the ER. He mentions his sugars for the past few days have run 300-400's. He states he has tolerated food and drinks well, without nausea of vomiting. This morning he took 70 units of insulin and 25 units of humalog. His left leg is not painful but swollen. His frequency of urination has increased. He states he has neuropathy in his right foot and cannot feel anything. He states associated symptoms include chest pain that has been ongoing for the past two days, unrelieved with albuterol. He states his right leg was amputated in 2016 after an ulcer became infectious. He mentions that two weeks ago he was hospitalized for one day due to influenza but he denies similar symptoms today. He states he continues to smoke 2 cigars a day and desires to quit. Experienced previously: Yes    HPI was provided by the patient. REVIEW OF SYSTEMS     Review of Systems   Constitutional: Negative for appetite change, chills, fatigue and fever. Eyes: Negative for photophobia and visual disturbance. Respiratory: Positive for chest tightness. Negative for cough, shortness of breath and wheezing. Cardiovascular: Positive for leg swelling. Negative for chest pain and palpitations. Gastrointestinal: Negative for abdominal pain, constipation, diarrhea, nausea and vomiting. Endocrine: Positive for polyuria. Genitourinary: Positive for frequency. Negative for difficulty urinating, dysuria, flank pain and hematuria. Skin: Positive for color change and wound. R-0NO PRINT      insulin aspart (NOVOLOG) 100 UNIT/ML injection vial Inject 30 Units into the skin 3 times daily (before meals) Indications: increased to 22 units on , but pt never increased dose., Disp-3 vial, R-3NO PRINT      gabapentin (NEURONTIN) 300 MG capsule Take 300 mg by mouth 2 times daily. Historical Med      albuterol sulfate  (90 Base) MCG/ACT inhaler Inhale 1 puff into the lungs every 6 hours as needed for Wheezing, Disp-1 Inhaler, R-3Print      acetaminophen (APAP EXTRA STRENGTH) 500 MG tablet Take 1 tablet by mouth every 6 hours as needed for Pain, Disp-120 tablet, R-0Print      sertraline (ZOLOFT) 100 MG tablet Take 1.5 tablets by mouth daily, Disp-45 tablet, R-0Normal      blood glucose monitor kit and supplies Use to test blood sugars DX:E11.65, Disp-1 kit, R-0, Normal      Lancets MISC Disp-200 each, R-0, NormalUse to test blood sugars 4 times daily DX:E11.65      Insulin Syringe-Needle U-100 29G X 1/2\" 0.3 ML MISC 4 TIMES DAILY AFTER MEALS AND BEFORE BEDTIME Starting 2019, Disp-200 each, R-0, Normal      blood glucose monitor strips Accucheck Sheila Test Strips Test blood sugars 4 times daily DX:E11.65, Disp-400 strip, R-5, Normal             ALLERGIES     is allergic to pcn [penicillins] and clindamycin/lincomycin. FAMILY HISTORY     He indicated that his mother is . He reported the following about his father: unknown. He indicated that the status of his maternal grandmother is unknown. He indicated that the status of his maternal grandfather is unknown.   family history includes Arthritis in his maternal grandfather; Depression in his mother; Diabetes in his mother; Early Death in his mother; Heart Disease in his maternal grandfather; High Blood Pressure in his mother; High Cholesterol in his mother; Other in his mother; Vision Loss in his maternal grandmother.     SOCIAL HISTORY       Social History     Socioeconomic History    Marital status:      Spouse

## 2019-09-25 ENCOUNTER — TELEPHONE (OUTPATIENT)
Dept: INTERNAL MEDICINE CLINIC | Age: 49
End: 2019-09-25

## 2019-09-26 ENCOUNTER — CARE COORDINATION (OUTPATIENT)
Dept: CARE COORDINATION | Age: 49
End: 2019-09-26

## 2019-09-30 LAB
BLOOD CULTURE, ROUTINE: NORMAL
BLOOD CULTURE, ROUTINE: NORMAL

## 2019-10-01 ENCOUNTER — CARE COORDINATION (OUTPATIENT)
Dept: CARE COORDINATION | Age: 49
End: 2019-10-01

## 2019-10-01 ASSESSMENT — ENCOUNTER SYMPTOMS: DYSPNEA ASSOCIATED WITH: EXERTION

## 2019-10-03 ENCOUNTER — CARE COORDINATION (OUTPATIENT)
Dept: CARE COORDINATION | Age: 49
End: 2019-10-03

## 2019-10-08 ENCOUNTER — OFFICE VISIT (OUTPATIENT)
Dept: INTERNAL MEDICINE CLINIC | Age: 49
End: 2019-10-08
Payer: MEDICARE

## 2019-10-08 VITALS
HEIGHT: 66 IN | BODY MASS INDEX: 33.91 KG/M2 | WEIGHT: 211 LBS | SYSTOLIC BLOOD PRESSURE: 128 MMHG | HEART RATE: 100 BPM | DIASTOLIC BLOOD PRESSURE: 70 MMHG

## 2019-10-08 DIAGNOSIS — E11.65 UNCONTROLLED TYPE 2 DIABETES MELLITUS WITH HYPERGLYCEMIA, WITH LONG-TERM CURRENT USE OF INSULIN (HCC): Chronic | ICD-10-CM

## 2019-10-08 DIAGNOSIS — Z79.4 UNCONTROLLED TYPE 2 DIABETES MELLITUS WITH HYPERGLYCEMIA, WITH LONG-TERM CURRENT USE OF INSULIN (HCC): Chronic | ICD-10-CM

## 2019-10-08 PROCEDURE — G0108 DIAB MANAGE TRN  PER INDIV: HCPCS | Performed by: INTERNAL MEDICINE

## 2019-10-08 RX ORDER — GLUCOSAMINE HCL/CHONDROITIN SU 500-400 MG
CAPSULE ORAL
Qty: 400 STRIP | Refills: 5 | Status: SHIPPED | OUTPATIENT
Start: 2019-10-08 | End: 2020-07-06 | Stop reason: SDUPTHER

## 2019-10-08 RX ORDER — LANCETS 30 GAUGE
EACH MISCELLANEOUS
Qty: 200 EACH | Refills: 0 | Status: SHIPPED | OUTPATIENT
Start: 2019-10-08 | End: 2020-02-19 | Stop reason: SDUPTHER

## 2019-10-10 ENCOUNTER — CARE COORDINATION (OUTPATIENT)
Dept: CARE COORDINATION | Age: 49
End: 2019-10-10

## 2019-10-10 ASSESSMENT — ENCOUNTER SYMPTOMS: DYSPNEA ASSOCIATED WITH: EXERTION

## 2019-10-15 ENCOUNTER — CARE COORDINATION (OUTPATIENT)
Dept: CARE COORDINATION | Age: 49
End: 2019-10-15

## 2019-10-15 ENCOUNTER — HOSPITAL ENCOUNTER (EMERGENCY)
Age: 49
Discharge: OTHER INSTITUTION | End: 2019-10-16
Payer: MEDICARE

## 2019-10-15 VITALS
TEMPERATURE: 98.3 F | HEART RATE: 88 BPM | SYSTOLIC BLOOD PRESSURE: 138 MMHG | DIASTOLIC BLOOD PRESSURE: 85 MMHG | RESPIRATION RATE: 18 BRPM | WEIGHT: 211 LBS | OXYGEN SATURATION: 99 % | BODY MASS INDEX: 33.91 KG/M2 | HEIGHT: 66 IN

## 2019-10-15 DIAGNOSIS — F39 MOOD DISORDER (HCC): Primary | ICD-10-CM

## 2019-10-15 LAB
ACETAMINOPHEN LEVEL: < 5 UG/ML (ref 0–20)
ALBUMIN SERPL-MCNC: 4.2 G/DL (ref 3.5–5.1)
ALP BLD-CCNC: 72 U/L (ref 38–126)
ALT SERPL-CCNC: 21 U/L (ref 11–66)
AMPHETAMINE+METHAMPHETAMINE URINE SCREEN: NEGATIVE
ANION GAP SERPL CALCULATED.3IONS-SCNC: 15 MEQ/L (ref 8–16)
AST SERPL-CCNC: 25 U/L (ref 5–40)
BACTERIA: ABNORMAL /HPF
BARBITURATE QUANTITATIVE URINE: NEGATIVE
BASOPHILS # BLD: 0.6 %
BASOPHILS ABSOLUTE: 0.1 THOU/MM3 (ref 0–0.1)
BENZODIAZEPINE QUANTITATIVE URINE: NEGATIVE
BILIRUB SERPL-MCNC: 0.4 MG/DL (ref 0.3–1.2)
BILIRUBIN DIRECT: < 0.2 MG/DL (ref 0–0.3)
BILIRUBIN URINE: NEGATIVE
BLOOD, URINE: NEGATIVE
BUN BLDV-MCNC: 11 MG/DL (ref 7–22)
CALCIUM SERPL-MCNC: 9.8 MG/DL (ref 8.5–10.5)
CANNABINOID QUANTITATIVE URINE: NEGATIVE
CASTS 2: ABNORMAL /LPF
CASTS UA: ABNORMAL /LPF
CHARACTER, URINE: CLEAR
CHLORIDE BLD-SCNC: 98 MEQ/L (ref 98–111)
CO2: 23 MEQ/L (ref 23–33)
COCAINE METABOLITE QUANTITATIVE URINE: NEGATIVE
COLOR: YELLOW
CREAT SERPL-MCNC: 0.8 MG/DL (ref 0.4–1.2)
CRYSTALS, UA: ABNORMAL
EOSINOPHIL # BLD: 3.9 %
EOSINOPHILS ABSOLUTE: 0.4 THOU/MM3 (ref 0–0.4)
EPITHELIAL CELLS, UA: ABNORMAL /HPF
ERYTHROCYTE [DISTWIDTH] IN BLOOD BY AUTOMATED COUNT: 14.3 % (ref 11.5–14.5)
ERYTHROCYTE [DISTWIDTH] IN BLOOD BY AUTOMATED COUNT: 44.7 FL (ref 35–45)
ETHYL ALCOHOL, SERUM: < 0.01 %
GFR SERPL CREATININE-BSD FRML MDRD: > 90 ML/MIN/1.73M2
GLUCOSE BLD-MCNC: 256 MG/DL (ref 70–108)
GLUCOSE BLD-MCNC: 271 MG/DL (ref 70–108)
GLUCOSE URINE: >= 1000 MG/DL
HCT VFR BLD CALC: 44.5 % (ref 42–52)
HEMOGLOBIN: 15.2 GM/DL (ref 14–18)
IMMATURE GRANS (ABS): 0.04 THOU/MM3 (ref 0–0.07)
IMMATURE GRANULOCYTES: 0.4 %
KETONES, URINE: NEGATIVE
LEUKOCYTE ESTERASE, URINE: ABNORMAL
LYMPHOCYTES # BLD: 27.6 %
LYMPHOCYTES ABSOLUTE: 2.8 THOU/MM3 (ref 1–4.8)
MCH RBC QN AUTO: 29.6 PG (ref 26–33)
MCHC RBC AUTO-ENTMCNC: 34.2 GM/DL (ref 32.2–35.5)
MCV RBC AUTO: 86.7 FL (ref 80–94)
MISCELLANEOUS 2: ABNORMAL
MONOCYTES # BLD: 6.9 %
MONOCYTES ABSOLUTE: 0.7 THOU/MM3 (ref 0.4–1.3)
NITRITE, URINE: NEGATIVE
NUCLEATED RED BLOOD CELLS: 0 /100 WBC
OPIATES, URINE: NEGATIVE
OSMOLALITY CALCULATION: 280.9 MOSMOL/KG (ref 275–300)
OXYCODONE: NEGATIVE
PH UA: 6.5 (ref 5–9)
PHENCYCLIDINE QUANTITATIVE URINE: NEGATIVE
PLATELET # BLD: 238 THOU/MM3 (ref 130–400)
PMV BLD AUTO: 9 FL (ref 9.4–12.4)
POTASSIUM SERPL-SCNC: 4.1 MEQ/L (ref 3.5–5.2)
PROTEIN UA: NEGATIVE
RBC # BLD: 5.13 MILL/MM3 (ref 4.7–6.1)
RBC URINE: ABNORMAL /HPF
RENAL EPITHELIAL, UA: ABNORMAL
SALICYLATE, SERUM: 0.3 MG/DL (ref 2–10)
SEG NEUTROPHILS: 60.6 %
SEGMENTED NEUTROPHILS ABSOLUTE COUNT: 6.2 THOU/MM3 (ref 1.8–7.7)
SODIUM BLD-SCNC: 136 MEQ/L (ref 135–145)
SPECIFIC GRAVITY, URINE: 1.02 (ref 1–1.03)
TOTAL PROTEIN: 8.1 G/DL (ref 6.1–8)
TSH SERPL DL<=0.05 MIU/L-ACNC: 2.7 UIU/ML (ref 0.4–4.2)
UROBILINOGEN, URINE: 1 EU/DL (ref 0–1)
WBC # BLD: 10.3 THOU/MM3 (ref 4.8–10.8)
WBC UA: ABNORMAL /HPF
YEAST: ABNORMAL

## 2019-10-15 PROCEDURE — 81001 URINALYSIS AUTO W/SCOPE: CPT

## 2019-10-15 PROCEDURE — G0480 DRUG TEST DEF 1-7 CLASSES: HCPCS

## 2019-10-15 PROCEDURE — 36415 COLL VENOUS BLD VENIPUNCTURE: CPT

## 2019-10-15 PROCEDURE — 2709999900 HC NON-CHARGEABLE SUPPLY

## 2019-10-15 PROCEDURE — 80307 DRUG TEST PRSMV CHEM ANLYZR: CPT

## 2019-10-15 PROCEDURE — 84443 ASSAY THYROID STIM HORMONE: CPT

## 2019-10-15 PROCEDURE — 99285 EMERGENCY DEPT VISIT HI MDM: CPT

## 2019-10-15 PROCEDURE — 85025 COMPLETE CBC W/AUTO DIFF WBC: CPT

## 2019-10-15 PROCEDURE — 82248 BILIRUBIN DIRECT: CPT

## 2019-10-15 PROCEDURE — 80053 COMPREHEN METABOLIC PANEL: CPT

## 2019-10-15 PROCEDURE — 82948 REAGENT STRIP/BLOOD GLUCOSE: CPT

## 2019-10-15 ASSESSMENT — SLEEP AND FATIGUE QUESTIONNAIRES
RESTFUL SLEEP: NO
DO YOU HAVE DIFFICULTY SLEEPING: YES
AVERAGE NUMBER OF SLEEP HOURS: 3
DIFFICULTY STAYING ASLEEP: YES
DIFFICULTY FALLING ASLEEP: YES
DIFFICULTY ARISING: NO

## 2019-10-15 ASSESSMENT — PATIENT HEALTH QUESTIONNAIRE - PHQ9: SUM OF ALL RESPONSES TO PHQ QUESTIONS 1-9: 26

## 2019-10-16 ENCOUNTER — HOSPITAL ENCOUNTER (INPATIENT)
Age: 49
LOS: 1 days | Discharge: HOME | DRG: 885 | End: 2019-10-17
Attending: PSYCHIATRY & NEUROLOGY | Admitting: PSYCHIATRY & NEUROLOGY
Payer: MEDICARE

## 2019-10-16 PROBLEM — F33.9 MAJOR DEPRESSIVE DISORDER, RECURRENT (HCC): Status: ACTIVE | Noted: 2019-10-16

## 2019-10-16 PROBLEM — F33.9 DEPRESSION, RECURRENT (HCC): Status: ACTIVE | Noted: 2019-10-16

## 2019-10-16 LAB
GLUCOSE BLD-MCNC: 242 MG/DL (ref 75–110)
GLUCOSE BLD-MCNC: 279 MG/DL (ref 75–110)
GLUCOSE BLD-MCNC: 285 MG/DL (ref 75–110)
GLUCOSE BLD-MCNC: 294 MG/DL (ref 75–110)
GLUCOSE BLD-MCNC: 305 MG/DL (ref 75–110)

## 2019-10-16 PROCEDURE — 90792 PSYCH DIAG EVAL W/MED SRVCS: CPT | Performed by: NURSE PRACTITIONER

## 2019-10-16 PROCEDURE — 6370000000 HC RX 637 (ALT 250 FOR IP): Performed by: NURSE PRACTITIONER

## 2019-10-16 PROCEDURE — 6370000000 HC RX 637 (ALT 250 FOR IP): Performed by: INTERNAL MEDICINE

## 2019-10-16 PROCEDURE — 1240000000 HC EMOTIONAL WELLNESS R&B

## 2019-10-16 PROCEDURE — 99222 1ST HOSP IP/OBS MODERATE 55: CPT | Performed by: INTERNAL MEDICINE

## 2019-10-16 PROCEDURE — 82947 ASSAY GLUCOSE BLOOD QUANT: CPT

## 2019-10-16 RX ORDER — LAMOTRIGINE 25 MG/1
25 TABLET ORAL DAILY
Status: DISCONTINUED | OUTPATIENT
Start: 2019-10-16 | End: 2019-10-17 | Stop reason: HOSPADM

## 2019-10-16 RX ORDER — MIRTAZAPINE 15 MG/1
15 TABLET, FILM COATED ORAL NIGHTLY
Status: DISCONTINUED | OUTPATIENT
Start: 2019-10-16 | End: 2019-10-17 | Stop reason: HOSPADM

## 2019-10-16 RX ORDER — DEXTROSE MONOHYDRATE 25 G/50ML
12.5 INJECTION, SOLUTION INTRAVENOUS PRN
Status: DISCONTINUED | OUTPATIENT
Start: 2019-10-16 | End: 2019-10-17 | Stop reason: HOSPADM

## 2019-10-16 RX ORDER — INSULIN GLARGINE 100 [IU]/ML
90 INJECTION, SOLUTION SUBCUTANEOUS 2 TIMES DAILY
Status: DISCONTINUED | OUTPATIENT
Start: 2019-10-16 | End: 2019-10-17 | Stop reason: HOSPADM

## 2019-10-16 RX ORDER — INSULIN GLARGINE 100 [IU]/ML
80 INJECTION, SOLUTION SUBCUTANEOUS 2 TIMES DAILY
Status: DISCONTINUED | OUTPATIENT
Start: 2019-10-16 | End: 2019-10-16

## 2019-10-16 RX ORDER — DEXTROSE MONOHYDRATE 50 MG/ML
100 INJECTION, SOLUTION INTRAVENOUS PRN
Status: DISCONTINUED | OUTPATIENT
Start: 2019-10-16 | End: 2019-10-17 | Stop reason: HOSPADM

## 2019-10-16 RX ORDER — TRAZODONE HYDROCHLORIDE 50 MG/1
50 TABLET ORAL NIGHTLY PRN
Status: DISCONTINUED | OUTPATIENT
Start: 2019-10-16 | End: 2019-10-17 | Stop reason: HOSPADM

## 2019-10-16 RX ORDER — NICOTINE POLACRILEX 4 MG
15 LOZENGE BUCCAL PRN
Status: DISCONTINUED | OUTPATIENT
Start: 2019-10-16 | End: 2019-10-17 | Stop reason: HOSPADM

## 2019-10-16 RX ADMIN — INSULIN GLARGINE 90 UNITS: 100 INJECTION, SOLUTION SUBCUTANEOUS at 20:54

## 2019-10-16 RX ADMIN — SERTRALINE HYDROCHLORIDE 50 MG: 50 TABLET ORAL at 14:33

## 2019-10-16 RX ADMIN — INSULIN LISPRO 6 UNITS: 100 INJECTION, SOLUTION INTRAVENOUS; SUBCUTANEOUS at 03:40

## 2019-10-16 RX ADMIN — INSULIN LISPRO 6 UNITS: 100 INJECTION, SOLUTION INTRAVENOUS; SUBCUTANEOUS at 11:35

## 2019-10-16 RX ADMIN — INSULIN GLARGINE 80 UNITS: 100 INJECTION, SOLUTION SUBCUTANEOUS at 09:00

## 2019-10-16 RX ADMIN — INSULIN LISPRO 4 UNITS: 100 INJECTION, SOLUTION INTRAVENOUS; SUBCUTANEOUS at 20:57

## 2019-10-16 RX ADMIN — MIRTAZAPINE 15 MG: 15 TABLET, FILM COATED ORAL at 20:54

## 2019-10-16 RX ADMIN — LAMOTRIGINE 25 MG: 25 TABLET ORAL at 14:33

## 2019-10-16 RX ADMIN — INSULIN LISPRO 4 UNITS: 100 INJECTION, SOLUTION INTRAVENOUS; SUBCUTANEOUS at 09:00

## 2019-10-16 RX ADMIN — INSULIN LISPRO 6 UNITS: 100 INJECTION, SOLUTION INTRAVENOUS; SUBCUTANEOUS at 17:57

## 2019-10-16 ASSESSMENT — SLEEP AND FATIGUE QUESTIONNAIRES
DO YOU USE A SLEEP AID: NO
DIFFICULTY FALLING ASLEEP: YES
RESTFUL SLEEP: NO
DO YOU HAVE DIFFICULTY SLEEPING: YES
SLEEP PATTERN: RESTLESSNESS;DISTURBED/INTERRUPTED SLEEP
DIFFICULTY ARISING: NO
AVERAGE NUMBER OF SLEEP HOURS: 4
DIFFICULTY STAYING ASLEEP: YES

## 2019-10-16 ASSESSMENT — PAIN SCALES - GENERAL: PAINLEVEL_OUTOF10: 0

## 2019-10-16 ASSESSMENT — PATIENT HEALTH QUESTIONNAIRE - PHQ9: SUM OF ALL RESPONSES TO PHQ QUESTIONS 1-9: 8

## 2019-10-16 ASSESSMENT — LIFESTYLE VARIABLES
HISTORY_ALCOHOL_USE: NO
HISTORY_ALCOHOL_USE: NO

## 2019-10-17 VITALS
HEIGHT: 66 IN | RESPIRATION RATE: 16 BRPM | DIASTOLIC BLOOD PRESSURE: 64 MMHG | SYSTOLIC BLOOD PRESSURE: 137 MMHG | WEIGHT: 211 LBS | BODY MASS INDEX: 33.91 KG/M2 | TEMPERATURE: 97.6 F | HEART RATE: 90 BPM | OXYGEN SATURATION: 100 %

## 2019-10-17 LAB
GLUCOSE BLD-MCNC: 197 MG/DL (ref 75–110)
GLUCOSE BLD-MCNC: 218 MG/DL (ref 75–110)

## 2019-10-17 PROCEDURE — 5130000000 HC BRIDGE APPOINTMENT

## 2019-10-17 PROCEDURE — 82947 ASSAY GLUCOSE BLOOD QUANT: CPT

## 2019-10-17 PROCEDURE — 6370000000 HC RX 637 (ALT 250 FOR IP): Performed by: NURSE PRACTITIONER

## 2019-10-17 PROCEDURE — 99238 HOSP IP/OBS DSCHRG MGMT 30/<: CPT | Performed by: NURSE PRACTITIONER

## 2019-10-17 PROCEDURE — 6370000000 HC RX 637 (ALT 250 FOR IP): Performed by: INTERNAL MEDICINE

## 2019-10-17 PROCEDURE — 99231 SBSQ HOSP IP/OBS SF/LOW 25: CPT | Performed by: INTERNAL MEDICINE

## 2019-10-17 RX ORDER — MIRTAZAPINE 15 MG/1
15 TABLET, FILM COATED ORAL NIGHTLY
Qty: 21 TABLET | Refills: 0 | Status: SHIPPED | OUTPATIENT
Start: 2019-10-17 | End: 2019-12-02 | Stop reason: SINTOL

## 2019-10-17 RX ORDER — INSULIN GLARGINE 100 [IU]/ML
90 INJECTION, SOLUTION SUBCUTANEOUS 2 TIMES DAILY
Qty: 1 VIAL | Refills: 0 | Status: SHIPPED | OUTPATIENT
Start: 2019-10-17 | End: 2019-11-06 | Stop reason: SDUPTHER

## 2019-10-17 RX ORDER — LAMOTRIGINE 25 MG/1
25 TABLET ORAL DAILY
Qty: 21 TABLET | Refills: 0 | Status: SHIPPED | OUTPATIENT
Start: 2019-10-18 | End: 2019-12-02

## 2019-10-17 RX ORDER — TRAZODONE HYDROCHLORIDE 50 MG/1
50 TABLET ORAL NIGHTLY PRN
Qty: 21 TABLET | Refills: 0 | Status: SHIPPED | OUTPATIENT
Start: 2019-10-17 | End: 2019-12-02 | Stop reason: SINTOL

## 2019-10-17 RX ADMIN — INSULIN LISPRO 2 UNITS: 100 INJECTION, SOLUTION INTRAVENOUS; SUBCUTANEOUS at 08:46

## 2019-10-17 RX ADMIN — INSULIN GLARGINE 90 UNITS: 100 INJECTION, SOLUTION SUBCUTANEOUS at 08:46

## 2019-10-17 RX ADMIN — INSULIN LISPRO 4 UNITS: 100 INJECTION, SOLUTION INTRAVENOUS; SUBCUTANEOUS at 11:56

## 2019-10-17 RX ADMIN — SERTRALINE HYDROCHLORIDE 50 MG: 50 TABLET ORAL at 08:45

## 2019-10-17 RX ADMIN — LAMOTRIGINE 25 MG: 25 TABLET ORAL at 08:45

## 2019-10-17 ASSESSMENT — ENCOUNTER SYMPTOMS
DIARRHEA: 0
VOMITING: 0
CONSTIPATION: 0
RHINORRHEA: 0
TROUBLE SWALLOWING: 0
WHEEZING: 0
ABDOMINAL PAIN: 0
SINUS PAIN: 0
SHORTNESS OF BREATH: 0
PHOTOPHOBIA: 0
COUGH: 0
NAUSEA: 0

## 2019-10-18 ENCOUNTER — CARE COORDINATION (OUTPATIENT)
Dept: CARE COORDINATION | Age: 49
End: 2019-10-18

## 2019-10-21 ENCOUNTER — CARE COORDINATION (OUTPATIENT)
Dept: CARE COORDINATION | Age: 49
End: 2019-10-21

## 2019-10-24 ENCOUNTER — CARE COORDINATION (OUTPATIENT)
Dept: CARE COORDINATION | Age: 49
End: 2019-10-24

## 2019-10-29 ENCOUNTER — CARE COORDINATION (OUTPATIENT)
Dept: CARE COORDINATION | Age: 49
End: 2019-10-29

## 2019-10-30 ENCOUNTER — CARE COORDINATION (OUTPATIENT)
Dept: CARE COORDINATION | Age: 49
End: 2019-10-30

## 2019-10-30 ASSESSMENT — ENCOUNTER SYMPTOMS: DYSPNEA ASSOCIATED WITH: EXERTION

## 2019-11-04 ENCOUNTER — CARE COORDINATION (OUTPATIENT)
Dept: CARE COORDINATION | Age: 49
End: 2019-11-04

## 2019-11-04 ASSESSMENT — ENCOUNTER SYMPTOMS: DYSPNEA ASSOCIATED WITH: EXERTION

## 2019-11-05 ENCOUNTER — OFFICE VISIT (OUTPATIENT)
Dept: INTERNAL MEDICINE CLINIC | Age: 49
End: 2019-11-05
Payer: MEDICARE

## 2019-11-05 VITALS
HEIGHT: 66 IN | DIASTOLIC BLOOD PRESSURE: 72 MMHG | WEIGHT: 221 LBS | HEART RATE: 100 BPM | BODY MASS INDEX: 35.52 KG/M2 | SYSTOLIC BLOOD PRESSURE: 132 MMHG

## 2019-11-05 DIAGNOSIS — M79.89 LEFT LEG SWELLING: Primary | ICD-10-CM

## 2019-11-05 DIAGNOSIS — Z79.4 TYPE 2 DIABETES MELLITUS WITH HYPERGLYCEMIA, WITH LONG-TERM CURRENT USE OF INSULIN (HCC): ICD-10-CM

## 2019-11-05 DIAGNOSIS — E11.65 TYPE 2 DIABETES MELLITUS WITH HYPERGLYCEMIA, WITH LONG-TERM CURRENT USE OF INSULIN (HCC): ICD-10-CM

## 2019-11-05 DIAGNOSIS — R20.9 COLD LEFT FOOT: ICD-10-CM

## 2019-11-05 PROCEDURE — 3017F COLORECTAL CA SCREEN DOC REV: CPT | Performed by: NURSE PRACTITIONER

## 2019-11-05 PROCEDURE — G8484 FLU IMMUNIZE NO ADMIN: HCPCS | Performed by: NURSE PRACTITIONER

## 2019-11-05 PROCEDURE — G8427 DOCREV CUR MEDS BY ELIG CLIN: HCPCS | Performed by: NURSE PRACTITIONER

## 2019-11-05 PROCEDURE — 3046F HEMOGLOBIN A1C LEVEL >9.0%: CPT | Performed by: NURSE PRACTITIONER

## 2019-11-05 PROCEDURE — 2022F DILAT RTA XM EVC RTNOPTHY: CPT | Performed by: NURSE PRACTITIONER

## 2019-11-05 PROCEDURE — G8417 CALC BMI ABV UP PARAM F/U: HCPCS | Performed by: NURSE PRACTITIONER

## 2019-11-05 PROCEDURE — 99214 OFFICE O/P EST MOD 30 MIN: CPT | Performed by: NURSE PRACTITIONER

## 2019-11-05 PROCEDURE — 1036F TOBACCO NON-USER: CPT | Performed by: NURSE PRACTITIONER

## 2019-11-05 PROCEDURE — 1111F DSCHRG MED/CURRENT MED MERGE: CPT | Performed by: NURSE PRACTITIONER

## 2019-11-06 ENCOUNTER — CARE COORDINATION (OUTPATIENT)
Dept: CARE COORDINATION | Age: 49
End: 2019-11-06

## 2019-11-06 ENCOUNTER — APPOINTMENT (OUTPATIENT)
Dept: INTERVENTIONAL RADIOLOGY/VASCULAR | Age: 49
End: 2019-11-06
Payer: MEDICARE

## 2019-11-06 ENCOUNTER — HOSPITAL ENCOUNTER (EMERGENCY)
Age: 49
Discharge: HOME | End: 2019-11-06
Attending: FAMILY MEDICINE
Payer: MEDICARE

## 2019-11-06 VITALS
DIASTOLIC BLOOD PRESSURE: 70 MMHG | HEART RATE: 98 BPM | OXYGEN SATURATION: 98 % | HEIGHT: 66 IN | SYSTOLIC BLOOD PRESSURE: 124 MMHG | WEIGHT: 211 LBS | RESPIRATION RATE: 18 BRPM | TEMPERATURE: 98.1 F | BODY MASS INDEX: 33.91 KG/M2

## 2019-11-06 LAB
ALBUMIN SERPL-MCNC: 3.8 G/DL (ref 3.5–5.1)
ALP BLD-CCNC: 73 U/L (ref 38–126)
ALT SERPL-CCNC: 23 U/L (ref 11–66)
ANION GAP SERPL CALCULATED.3IONS-SCNC: 17 MEQ/L (ref 8–16)
APTT: 34.9 SECONDS (ref 22–38)
AST SERPL-CCNC: 29 U/L (ref 5–40)
BASOPHILS # BLD: 0.7 %
BASOPHILS ABSOLUTE: 0.1 THOU/MM3 (ref 0–0.1)
BILIRUB SERPL-MCNC: 0.4 MG/DL (ref 0.3–1.2)
BILIRUBIN DIRECT: < 0.2 MG/DL (ref 0–0.3)
BUN BLDV-MCNC: 21 MG/DL (ref 7–22)
C-REACTIVE PROTEIN: 1.4 MG/DL (ref 0–1)
CALCIUM SERPL-MCNC: 9.5 MG/DL (ref 8.5–10.5)
CHLORIDE BLD-SCNC: 93 MEQ/L (ref 98–111)
CO2: 21 MEQ/L (ref 23–33)
CREAT SERPL-MCNC: 0.8 MG/DL (ref 0.4–1.2)
EKG ATRIAL RATE: 101 BPM
EKG P AXIS: 49 DEGREES
EKG P-R INTERVAL: 156 MS
EKG Q-T INTERVAL: 348 MS
EKG QRS DURATION: 78 MS
EKG QTC CALCULATION (BAZETT): 451 MS
EKG R AXIS: 17 DEGREES
EKG T AXIS: 63 DEGREES
EKG VENTRICULAR RATE: 101 BPM
EOSINOPHIL # BLD: 3.1 %
EOSINOPHILS ABSOLUTE: 0.4 THOU/MM3 (ref 0–0.4)
ERYTHROCYTE [DISTWIDTH] IN BLOOD BY AUTOMATED COUNT: 14.3 % (ref 11.5–14.5)
ERYTHROCYTE [DISTWIDTH] IN BLOOD BY AUTOMATED COUNT: 45.2 FL (ref 35–45)
GFR SERPL CREATININE-BSD FRML MDRD: > 90 ML/MIN/1.73M2
GLUCOSE BLD-MCNC: 298 MG/DL (ref 70–108)
HCT VFR BLD CALC: 43.4 % (ref 42–52)
HEMOGLOBIN: 14.8 GM/DL (ref 14–18)
IMMATURE GRANS (ABS): 0.1 THOU/MM3 (ref 0–0.07)
IMMATURE GRANULOCYTES: 0.7 %
INR BLD: 0.97 (ref 0.85–1.13)
LYMPHOCYTES # BLD: 22.1 %
LYMPHOCYTES ABSOLUTE: 3 THOU/MM3 (ref 1–4.8)
MCH RBC QN AUTO: 29.7 PG (ref 26–33)
MCHC RBC AUTO-ENTMCNC: 34.1 GM/DL (ref 32.2–35.5)
MCV RBC AUTO: 87.1 FL (ref 80–94)
MONOCYTES # BLD: 8.2 %
MONOCYTES ABSOLUTE: 1.1 THOU/MM3 (ref 0.4–1.3)
NUCLEATED RED BLOOD CELLS: 0 /100 WBC
OSMOLALITY CALCULATION: 276.7 MOSMOL/KG (ref 275–300)
PLATELET # BLD: 260 THOU/MM3 (ref 130–400)
PMV BLD AUTO: 8.9 FL (ref 9.4–12.4)
POTASSIUM SERPL-SCNC: 4.5 MEQ/L (ref 3.5–5.2)
RBC # BLD: 4.98 MILL/MM3 (ref 4.7–6.1)
SEG NEUTROPHILS: 65.2 %
SEGMENTED NEUTROPHILS ABSOLUTE COUNT: 8.7 THOU/MM3 (ref 1.8–7.7)
SODIUM BLD-SCNC: 131 MEQ/L (ref 135–145)
TOTAL CK: 164 U/L (ref 55–170)
TOTAL PROTEIN: 8.1 G/DL (ref 6.1–8)
WBC # BLD: 13.4 THOU/MM3 (ref 4.8–10.8)

## 2019-11-06 PROCEDURE — 85025 COMPLETE CBC W/AUTO DIFF WBC: CPT

## 2019-11-06 PROCEDURE — 99284 EMERGENCY DEPT VISIT MOD MDM: CPT

## 2019-11-06 PROCEDURE — 85610 PROTHROMBIN TIME: CPT

## 2019-11-06 PROCEDURE — 86140 C-REACTIVE PROTEIN: CPT

## 2019-11-06 PROCEDURE — 93010 ELECTROCARDIOGRAM REPORT: CPT | Performed by: NUCLEAR MEDICINE

## 2019-11-06 PROCEDURE — 85730 THROMBOPLASTIN TIME PARTIAL: CPT

## 2019-11-06 PROCEDURE — 93926 LOWER EXTREMITY STUDY: CPT

## 2019-11-06 PROCEDURE — 36415 COLL VENOUS BLD VENIPUNCTURE: CPT

## 2019-11-06 PROCEDURE — 82248 BILIRUBIN DIRECT: CPT

## 2019-11-06 PROCEDURE — 82550 ASSAY OF CK (CPK): CPT

## 2019-11-06 PROCEDURE — 80053 COMPREHEN METABOLIC PANEL: CPT

## 2019-11-06 PROCEDURE — 2580000003 HC RX 258: Performed by: FAMILY MEDICINE

## 2019-11-06 PROCEDURE — 93005 ELECTROCARDIOGRAM TRACING: CPT | Performed by: FAMILY MEDICINE

## 2019-11-06 RX ORDER — SODIUM CHLORIDE 9 MG/ML
INJECTION, SOLUTION INTRAVENOUS CONTINUOUS
Status: DISCONTINUED | OUTPATIENT
Start: 2019-11-06 | End: 2019-11-06 | Stop reason: HOSPADM

## 2019-11-06 RX ORDER — INSULIN GLARGINE 100 [IU]/ML
100 INJECTION, SOLUTION SUBCUTANEOUS 2 TIMES DAILY
Qty: 1 VIAL | Refills: 0 | Status: SHIPPED | OUTPATIENT
Start: 2019-11-06 | End: 2020-01-06 | Stop reason: SDUPTHER

## 2019-11-06 RX ADMIN — SODIUM CHLORIDE: 9 INJECTION, SOLUTION INTRAVENOUS at 17:12

## 2019-11-06 ASSESSMENT — PAIN DESCRIPTION - LOCATION: LOCATION: FOOT

## 2019-11-06 ASSESSMENT — ENCOUNTER SYMPTOMS
ABDOMINAL PAIN: 0
COLOR CHANGE: 1
SHORTNESS OF BREATH: 0
EYE DISCHARGE: 0

## 2019-11-06 ASSESSMENT — PAIN DESCRIPTION - ORIENTATION: ORIENTATION: LEFT

## 2019-11-06 ASSESSMENT — PAIN SCALES - GENERAL: PAINLEVEL_OUTOF10: 7

## 2019-11-07 ENCOUNTER — CARE COORDINATION (OUTPATIENT)
Dept: CARE COORDINATION | Age: 49
End: 2019-11-07

## 2019-11-07 ENCOUNTER — TELEPHONE (OUTPATIENT)
Dept: INTERNAL MEDICINE CLINIC | Age: 49
End: 2019-11-07

## 2019-11-07 ASSESSMENT — ENCOUNTER SYMPTOMS: DYSPNEA ASSOCIATED WITH: EXERTION

## 2019-11-10 ASSESSMENT — ENCOUNTER SYMPTOMS
CHOKING: 0
NAUSEA: 0
SORE THROAT: 0
COUGH: 0
SHORTNESS OF BREATH: 0
DIARRHEA: 0
ABDOMINAL PAIN: 0
TROUBLE SWALLOWING: 0
EYE ITCHING: 0
CONSTIPATION: 0
VOMITING: 0

## 2019-11-14 ENCOUNTER — CARE COORDINATION (OUTPATIENT)
Dept: CARE COORDINATION | Age: 49
End: 2019-11-14

## 2019-11-26 ENCOUNTER — CARE COORDINATION (OUTPATIENT)
Dept: CARE COORDINATION | Age: 49
End: 2019-11-26

## 2019-11-26 ASSESSMENT — ENCOUNTER SYMPTOMS: DYSPNEA ASSOCIATED WITH: EXERTION

## 2019-12-02 ENCOUNTER — OFFICE VISIT (OUTPATIENT)
Dept: INTERNAL MEDICINE CLINIC | Age: 49
End: 2019-12-02
Payer: MEDICARE

## 2019-12-02 VITALS — DIASTOLIC BLOOD PRESSURE: 74 MMHG | SYSTOLIC BLOOD PRESSURE: 172 MMHG | HEART RATE: 104 BPM

## 2019-12-02 DIAGNOSIS — G62.9 NEUROPATHY: ICD-10-CM

## 2019-12-02 DIAGNOSIS — F31.9 BIPOLAR 1 DISORDER (HCC): ICD-10-CM

## 2019-12-02 DIAGNOSIS — M62.541 MUSCLE WASTING AND ATROPHY, NOT ELSEWHERE CLASSIFIED, RIGHT HAND: ICD-10-CM

## 2019-12-02 DIAGNOSIS — F33.2 SEVERE EPISODE OF RECURRENT MAJOR DEPRESSIVE DISORDER, WITHOUT PSYCHOTIC FEATURES (HCC): ICD-10-CM

## 2019-12-02 DIAGNOSIS — L89.890: ICD-10-CM

## 2019-12-02 LAB — HBA1C MFR BLD: 11.3 % (ref 4.3–5.7)

## 2019-12-02 PROCEDURE — G8417 CALC BMI ABV UP PARAM F/U: HCPCS | Performed by: NURSE PRACTITIONER

## 2019-12-02 PROCEDURE — G8427 DOCREV CUR MEDS BY ELIG CLIN: HCPCS | Performed by: NURSE PRACTITIONER

## 2019-12-02 PROCEDURE — 2022F DILAT RTA XM EVC RTNOPTHY: CPT | Performed by: NURSE PRACTITIONER

## 2019-12-02 PROCEDURE — 83036 HEMOGLOBIN GLYCOSYLATED A1C: CPT | Performed by: NURSE PRACTITIONER

## 2019-12-02 PROCEDURE — 3017F COLORECTAL CA SCREEN DOC REV: CPT | Performed by: NURSE PRACTITIONER

## 2019-12-02 PROCEDURE — G8484 FLU IMMUNIZE NO ADMIN: HCPCS | Performed by: NURSE PRACTITIONER

## 2019-12-02 PROCEDURE — 1036F TOBACCO NON-USER: CPT | Performed by: NURSE PRACTITIONER

## 2019-12-02 PROCEDURE — 3046F HEMOGLOBIN A1C LEVEL >9.0%: CPT | Performed by: NURSE PRACTITIONER

## 2019-12-02 PROCEDURE — 99214 OFFICE O/P EST MOD 30 MIN: CPT | Performed by: NURSE PRACTITIONER

## 2019-12-02 RX ORDER — CEPHALEXIN 500 MG/1
500 CAPSULE ORAL 3 TIMES DAILY
Qty: 30 CAPSULE | Refills: 0 | Status: SHIPPED | OUTPATIENT
Start: 2019-12-02 | End: 2019-12-12

## 2019-12-02 RX ORDER — GABAPENTIN 100 MG/1
100 CAPSULE ORAL 2 TIMES DAILY
Qty: 60 CAPSULE | Refills: 0 | Status: SHIPPED | OUTPATIENT
Start: 2019-12-02 | End: 2019-12-13

## 2019-12-02 ASSESSMENT — ENCOUNTER SYMPTOMS
COUGH: 0
SORE THROAT: 0
CHOKING: 0
NAUSEA: 0
SHORTNESS OF BREATH: 0
EYE ITCHING: 0
TROUBLE SWALLOWING: 0
ABDOMINAL PAIN: 0
CONSTIPATION: 0
VOMITING: 0
DIARRHEA: 0

## 2019-12-04 ENCOUNTER — TELEPHONE (OUTPATIENT)
Dept: WOUND CARE | Age: 49
End: 2019-12-04

## 2019-12-04 ENCOUNTER — APPOINTMENT (OUTPATIENT)
Dept: GENERAL RADIOLOGY | Age: 49
DRG: 638 | End: 2019-12-04
Payer: MEDICARE

## 2019-12-04 ENCOUNTER — TELEPHONE (OUTPATIENT)
Dept: INTERNAL MEDICINE CLINIC | Age: 49
End: 2019-12-04

## 2019-12-04 ENCOUNTER — HOSPITAL ENCOUNTER (INPATIENT)
Age: 49
LOS: 2 days | Discharge: HOME | DRG: 638 | End: 2019-12-06
Attending: INTERNAL MEDICINE | Admitting: INTERNAL MEDICINE
Payer: MEDICARE

## 2019-12-04 ENCOUNTER — APPOINTMENT (OUTPATIENT)
Dept: ULTRASOUND IMAGING | Age: 49
DRG: 638 | End: 2019-12-04
Payer: MEDICARE

## 2019-12-04 DIAGNOSIS — L03.116 CELLULITIS OF LEFT LOWER EXTREMITY: Primary | ICD-10-CM

## 2019-12-04 LAB
ANION GAP SERPL CALCULATED.3IONS-SCNC: 16 MEQ/L (ref 8–16)
AVERAGE GLUCOSE: 282 MG/DL (ref 70–126)
BASOPHILS # BLD: 0.8 %
BASOPHILS ABSOLUTE: 0.1 THOU/MM3 (ref 0–0.1)
BUN BLDV-MCNC: 14 MG/DL (ref 7–22)
CALCIUM SERPL-MCNC: 9.5 MG/DL (ref 8.5–10.5)
CHLORIDE BLD-SCNC: 92 MEQ/L (ref 98–111)
CO2: 22 MEQ/L (ref 23–33)
CREAT SERPL-MCNC: 0.7 MG/DL (ref 0.4–1.2)
EOSINOPHIL # BLD: 4.2 %
EOSINOPHILS ABSOLUTE: 0.4 THOU/MM3 (ref 0–0.4)
ERYTHROCYTE [DISTWIDTH] IN BLOOD BY AUTOMATED COUNT: 13.9 % (ref 11.5–14.5)
ERYTHROCYTE [DISTWIDTH] IN BLOOD BY AUTOMATED COUNT: 44.3 FL (ref 35–45)
GFR SERPL CREATININE-BSD FRML MDRD: > 90 ML/MIN/1.73M2
GLUCOSE BLD-MCNC: 334 MG/DL (ref 70–108)
GLUCOSE BLD-MCNC: 439 MG/DL (ref 70–108)
GLUCOSE BLD-MCNC: 490 MG/DL (ref 70–108)
GLUCOSE BLD-MCNC: 496 MG/DL (ref 70–108)
HBA1C MFR BLD: 11.4 % (ref 4.4–6.4)
HCT VFR BLD CALC: 43.7 % (ref 42–52)
HEMOGLOBIN: 14.9 GM/DL (ref 14–18)
IMMATURE GRANS (ABS): 0.09 THOU/MM3 (ref 0–0.07)
IMMATURE GRANULOCYTES: 0.9 %
LYMPHOCYTES # BLD: 22.4 %
LYMPHOCYTES ABSOLUTE: 2.2 THOU/MM3 (ref 1–4.8)
MCH RBC QN AUTO: 29.6 PG (ref 26–33)
MCHC RBC AUTO-ENTMCNC: 34.1 GM/DL (ref 32.2–35.5)
MCV RBC AUTO: 86.7 FL (ref 80–94)
MONOCYTES # BLD: 7.6 %
MONOCYTES ABSOLUTE: 0.8 THOU/MM3 (ref 0.4–1.3)
NUCLEATED RED BLOOD CELLS: 0 /100 WBC
OSMOLALITY CALCULATION: 280.2 MOSMOL/KG (ref 275–300)
PLATELET # BLD: 260 THOU/MM3 (ref 130–400)
PMV BLD AUTO: 9.9 FL (ref 9.4–12.4)
POTASSIUM SERPL-SCNC: 5.6 MEQ/L (ref 3.5–5.2)
RBC # BLD: 5.04 MILL/MM3 (ref 4.7–6.1)
REASON FOR REJECTION: NORMAL
REJECTED TEST: NORMAL
SEG NEUTROPHILS: 64.1 %
SEGMENTED NEUTROPHILS ABSOLUTE COUNT: 6.3 THOU/MM3 (ref 1.8–7.7)
SODIUM BLD-SCNC: 130 MEQ/L (ref 135–145)
WBC # BLD: 9.9 THOU/MM3 (ref 4.8–10.8)

## 2019-12-04 PROCEDURE — 87040 BLOOD CULTURE FOR BACTERIA: CPT

## 2019-12-04 PROCEDURE — 2580000003 HC RX 258: Performed by: EMERGENCY MEDICINE

## 2019-12-04 PROCEDURE — 6370000000 HC RX 637 (ALT 250 FOR IP): Performed by: PHYSICIAN ASSISTANT

## 2019-12-04 PROCEDURE — 83036 HEMOGLOBIN GLYCOSYLATED A1C: CPT

## 2019-12-04 PROCEDURE — 94760 N-INVAS EAR/PLS OXIMETRY 1: CPT

## 2019-12-04 PROCEDURE — 1200000000 HC SEMI PRIVATE

## 2019-12-04 PROCEDURE — 87507 IADNA-DNA/RNA PROBE TQ 12-25: CPT

## 2019-12-04 PROCEDURE — 6360000002 HC RX W HCPCS: Performed by: PHYSICIAN ASSISTANT

## 2019-12-04 PROCEDURE — 99223 1ST HOSP IP/OBS HIGH 75: CPT | Performed by: PHYSICIAN ASSISTANT

## 2019-12-04 PROCEDURE — 85025 COMPLETE CBC W/AUTO DIFF WBC: CPT

## 2019-12-04 PROCEDURE — 6360000002 HC RX W HCPCS: Performed by: EMERGENCY MEDICINE

## 2019-12-04 PROCEDURE — 99285 EMERGENCY DEPT VISIT HI MDM: CPT

## 2019-12-04 PROCEDURE — 82948 REAGENT STRIP/BLOOD GLUCOSE: CPT

## 2019-12-04 PROCEDURE — 76882 US LMTD JT/FCL EVL NVASC XTR: CPT

## 2019-12-04 PROCEDURE — 80048 BASIC METABOLIC PNL TOTAL CA: CPT

## 2019-12-04 PROCEDURE — 2580000003 HC RX 258: Performed by: PHYSICIAN ASSISTANT

## 2019-12-04 PROCEDURE — 36415 COLL VENOUS BLD VENIPUNCTURE: CPT

## 2019-12-04 PROCEDURE — 2709999900 HC NON-CHARGEABLE SUPPLY

## 2019-12-04 PROCEDURE — 73590 X-RAY EXAM OF LOWER LEG: CPT

## 2019-12-04 RX ORDER — ALBUTEROL SULFATE 90 UG/1
1 AEROSOL, METERED RESPIRATORY (INHALATION) EVERY 6 HOURS PRN
Status: DISCONTINUED | OUTPATIENT
Start: 2019-12-04 | End: 2019-12-06 | Stop reason: HOSPADM

## 2019-12-04 RX ORDER — SODIUM CHLORIDE 9 MG/ML
INJECTION, SOLUTION INTRAVENOUS CONTINUOUS
Status: DISCONTINUED | OUTPATIENT
Start: 2019-12-04 | End: 2019-12-06 | Stop reason: HOSPADM

## 2019-12-04 RX ORDER — SODIUM CHLORIDE 0.9 % (FLUSH) 0.9 %
10 SYRINGE (ML) INJECTION EVERY 12 HOURS SCHEDULED
Status: DISCONTINUED | OUTPATIENT
Start: 2019-12-04 | End: 2019-12-06 | Stop reason: HOSPADM

## 2019-12-04 RX ORDER — DEXTROSE MONOHYDRATE 25 G/50ML
12.5 INJECTION, SOLUTION INTRAVENOUS PRN
Status: DISCONTINUED | OUTPATIENT
Start: 2019-12-04 | End: 2019-12-06 | Stop reason: HOSPADM

## 2019-12-04 RX ORDER — OXYCODONE HYDROCHLORIDE AND ACETAMINOPHEN 5; 325 MG/1; MG/1
1 TABLET ORAL ONCE
Status: COMPLETED | OUTPATIENT
Start: 2019-12-04 | End: 2019-12-04

## 2019-12-04 RX ORDER — INSULIN GLARGINE 100 [IU]/ML
100 INJECTION, SOLUTION SUBCUTANEOUS 2 TIMES DAILY
Status: DISCONTINUED | OUTPATIENT
Start: 2019-12-04 | End: 2019-12-06 | Stop reason: HOSPADM

## 2019-12-04 RX ORDER — ONDANSETRON 2 MG/ML
4 INJECTION INTRAMUSCULAR; INTRAVENOUS EVERY 6 HOURS PRN
Status: DISCONTINUED | OUTPATIENT
Start: 2019-12-04 | End: 2019-12-06 | Stop reason: HOSPADM

## 2019-12-04 RX ORDER — POTASSIUM CHLORIDE 7.45 MG/ML
10 INJECTION INTRAVENOUS PRN
Status: DISCONTINUED | OUTPATIENT
Start: 2019-12-04 | End: 2019-12-06 | Stop reason: HOSPADM

## 2019-12-04 RX ORDER — SODIUM CHLORIDE 0.9 % (FLUSH) 0.9 %
10 SYRINGE (ML) INJECTION PRN
Status: DISCONTINUED | OUTPATIENT
Start: 2019-12-04 | End: 2019-12-06 | Stop reason: HOSPADM

## 2019-12-04 RX ORDER — DEXTROSE MONOHYDRATE 50 MG/ML
100 INJECTION, SOLUTION INTRAVENOUS PRN
Status: DISCONTINUED | OUTPATIENT
Start: 2019-12-04 | End: 2019-12-06 | Stop reason: HOSPADM

## 2019-12-04 RX ORDER — KETOROLAC TROMETHAMINE 30 MG/ML
15 INJECTION, SOLUTION INTRAMUSCULAR; INTRAVENOUS EVERY 6 HOURS PRN
Status: DISCONTINUED | OUTPATIENT
Start: 2019-12-04 | End: 2019-12-06 | Stop reason: HOSPADM

## 2019-12-04 RX ORDER — ACETAMINOPHEN 325 MG/1
650 TABLET ORAL EVERY 4 HOURS PRN
Status: DISCONTINUED | OUTPATIENT
Start: 2019-12-04 | End: 2019-12-06 | Stop reason: HOSPADM

## 2019-12-04 RX ORDER — GABAPENTIN 100 MG/1
100 CAPSULE ORAL 2 TIMES DAILY
Status: DISCONTINUED | OUTPATIENT
Start: 2019-12-04 | End: 2019-12-06 | Stop reason: HOSPADM

## 2019-12-04 RX ORDER — NICOTINE POLACRILEX 4 MG
15 LOZENGE BUCCAL PRN
Status: DISCONTINUED | OUTPATIENT
Start: 2019-12-04 | End: 2019-12-06 | Stop reason: HOSPADM

## 2019-12-04 RX ORDER — PANTOPRAZOLE SODIUM 40 MG/1
40 TABLET, DELAYED RELEASE ORAL
Status: DISCONTINUED | OUTPATIENT
Start: 2019-12-05 | End: 2019-12-06 | Stop reason: HOSPADM

## 2019-12-04 RX ADMIN — INSULIN LISPRO 32 UNITS: 100 INJECTION, SOLUTION INTRAVENOUS; SUBCUTANEOUS at 18:17

## 2019-12-04 RX ADMIN — CEFEPIME 2 G: 2 INJECTION, POWDER, FOR SOLUTION INTRAMUSCULAR; INTRAVENOUS at 22:06

## 2019-12-04 RX ADMIN — ENOXAPARIN SODIUM 40 MG: 40 INJECTION SUBCUTANEOUS at 17:14

## 2019-12-04 RX ADMIN — OXYCODONE HYDROCHLORIDE AND ACETAMINOPHEN 1 TABLET: 5; 325 TABLET ORAL at 11:48

## 2019-12-04 RX ADMIN — INSULIN GLARGINE 100 UNITS: 100 INJECTION, SOLUTION SUBCUTANEOUS at 22:09

## 2019-12-04 RX ADMIN — GABAPENTIN 100 MG: 100 CAPSULE ORAL at 22:06

## 2019-12-04 RX ADMIN — SERTRALINE 50 MG: 50 TABLET, FILM COATED ORAL at 18:16

## 2019-12-04 RX ADMIN — SODIUM CHLORIDE: 9 INJECTION, SOLUTION INTRAVENOUS at 17:10

## 2019-12-04 RX ADMIN — VANCOMYCIN HYDROCHLORIDE 1500 MG: 5 INJECTION, POWDER, LYOPHILIZED, FOR SOLUTION INTRAVENOUS at 17:10

## 2019-12-04 ASSESSMENT — PAIN DESCRIPTION - PAIN TYPE
TYPE: ACUTE PAIN

## 2019-12-04 ASSESSMENT — ENCOUNTER SYMPTOMS
EYE DISCHARGE: 0
RHINORRHEA: 0
BACK PAIN: 0
WHEEZING: 0
SORE THROAT: 0
COUGH: 0
NAUSEA: 0
VOMITING: 0
ABDOMINAL PAIN: 0
EYE REDNESS: 0
SHORTNESS OF BREATH: 0
DIARRHEA: 0

## 2019-12-04 ASSESSMENT — PAIN SCALES - GENERAL
PAINLEVEL_OUTOF10: 0
PAINLEVEL_OUTOF10: 10
PAINLEVEL_OUTOF10: 3
PAINLEVEL_OUTOF10: 9
PAINLEVEL_OUTOF10: 5

## 2019-12-04 ASSESSMENT — PAIN DESCRIPTION - PROGRESSION
CLINICAL_PROGRESSION: NOT CHANGED
CLINICAL_PROGRESSION: GRADUALLY IMPROVING

## 2019-12-04 ASSESSMENT — PAIN DESCRIPTION - ORIENTATION
ORIENTATION: LEFT
ORIENTATION: LEFT

## 2019-12-04 ASSESSMENT — PAIN DESCRIPTION - FREQUENCY
FREQUENCY: CONTINUOUS
FREQUENCY: CONTINUOUS

## 2019-12-04 ASSESSMENT — PAIN DESCRIPTION - ONSET
ONSET: ON-GOING
ONSET: GRADUAL

## 2019-12-04 ASSESSMENT — PAIN DESCRIPTION - DESCRIPTORS
DESCRIPTORS: ACHING
DESCRIPTORS: ACHING

## 2019-12-04 ASSESSMENT — PAIN DESCRIPTION - LOCATION
LOCATION: FOOT
LOCATION: FOOT

## 2019-12-04 NOTE — PROGRESS NOTES
Pt called stating \" right leg wound is red, warm, swollen and, painful. Want appointment moved from next week to today. \" Bertha Taylor. Per Dr. Luis Enrique Taylor - no openings today, pt needs to go to ER per Dr. Luis Enrique Taylor. Pt advised. Pt voiced understanding.

## 2019-12-05 LAB
ADENOVIRUS F 40 41 PCR: NOT DETECTED
ANION GAP SERPL CALCULATED.3IONS-SCNC: 15 MEQ/L (ref 8–16)
ASTROVIRUS PCR: NOT DETECTED
BUN BLDV-MCNC: 16 MG/DL (ref 7–22)
CALCIUM SERPL-MCNC: 8.9 MG/DL (ref 8.5–10.5)
CAMPYLOBACTER PCR: NOT DETECTED
CHLORIDE BLD-SCNC: 97 MEQ/L (ref 98–111)
CLOSTRIDIUM DIFFICILE, PCR: NOT DETECTED
CO2: 20 MEQ/L (ref 23–33)
CREAT SERPL-MCNC: 0.6 MG/DL (ref 0.4–1.2)
CRYPTOSPORIDIUM PCR: NOT DETECTED
CYCLOSPORA CAYETANENSIS PCR: NOT DETECTED
E COLI 0157 PCR: NORMAL
E COLI ENTEROAGGREGATIVE PCR: NOT DETECTED
E COLI ENTEROPATHOGENIC PCR: NOT DETECTED
E COLI ENTEROTOXIGENIC PCR: NOT DETECTED
E COLI SHIGA LIKE TOXIN PCR: NOT DETECTED
E COLI SHIGELLA/ENTEROINVASIVE PCR: NOT DETECTED
E HISTOLYTICA GI FILM ARRAY: NOT DETECTED
ERYTHROCYTE [DISTWIDTH] IN BLOOD BY AUTOMATED COUNT: 14 % (ref 11.5–14.5)
ERYTHROCYTE [DISTWIDTH] IN BLOOD BY AUTOMATED COUNT: 44.8 FL (ref 35–45)
GFR SERPL CREATININE-BSD FRML MDRD: > 90 ML/MIN/1.73M2
GIARDIA LAMBLIA PCR: NOT DETECTED
GLUCOSE BLD-MCNC: 228 MG/DL (ref 70–108)
GLUCOSE BLD-MCNC: 269 MG/DL (ref 70–108)
GLUCOSE BLD-MCNC: 291 MG/DL (ref 70–108)
GLUCOSE BLD-MCNC: 296 MG/DL (ref 70–108)
GLUCOSE BLD-MCNC: 429 MG/DL (ref 70–108)
HCT VFR BLD CALC: 42.4 % (ref 42–52)
HEMOGLOBIN: 14.1 GM/DL (ref 14–18)
MCH RBC QN AUTO: 29.4 PG (ref 26–33)
MCHC RBC AUTO-ENTMCNC: 33.3 GM/DL (ref 32.2–35.5)
MCV RBC AUTO: 88.5 FL (ref 80–94)
NOROVIRUS GI GII PCR: NOT DETECTED
PLATELET # BLD: 215 THOU/MM3 (ref 130–400)
PLESIOMONAS SHIGELLOIDES PCR: NOT DETECTED
PMV BLD AUTO: 9.4 FL (ref 9.4–12.4)
POTASSIUM REFLEX MAGNESIUM: 3.9 MEQ/L (ref 3.5–5.2)
RBC # BLD: 4.79 MILL/MM3 (ref 4.7–6.1)
ROTAVIRUS A PCR: NOT DETECTED
SALMONELLA PCR: NOT DETECTED
SAPOVIRUS PCR: NOT DETECTED
SODIUM BLD-SCNC: 132 MEQ/L (ref 135–145)
VIBRIO CHOLERAE PCR: NOT DETECTED
VIBRIO PCR: NOT DETECTED
WBC # BLD: 10 THOU/MM3 (ref 4.8–10.8)
YERSINIA ENTEROCOLITICA PCR: NOT DETECTED

## 2019-12-05 PROCEDURE — 6370000000 HC RX 637 (ALT 250 FOR IP): Performed by: PHYSICIAN ASSISTANT

## 2019-12-05 PROCEDURE — 2580000003 HC RX 258: Performed by: PHYSICIAN ASSISTANT

## 2019-12-05 PROCEDURE — 94760 N-INVAS EAR/PLS OXIMETRY 1: CPT

## 2019-12-05 PROCEDURE — 36415 COLL VENOUS BLD VENIPUNCTURE: CPT

## 2019-12-05 PROCEDURE — 2709999900 HC NON-CHARGEABLE SUPPLY

## 2019-12-05 PROCEDURE — 6360000002 HC RX W HCPCS: Performed by: PHYSICIAN ASSISTANT

## 2019-12-05 PROCEDURE — 6360000002 HC RX W HCPCS: Performed by: INTERNAL MEDICINE

## 2019-12-05 PROCEDURE — 99233 SBSQ HOSP IP/OBS HIGH 50: CPT | Performed by: PHYSICIAN ASSISTANT

## 2019-12-05 PROCEDURE — 1200000000 HC SEMI PRIVATE

## 2019-12-05 PROCEDURE — 82948 REAGENT STRIP/BLOOD GLUCOSE: CPT

## 2019-12-05 PROCEDURE — 80048 BASIC METABOLIC PNL TOTAL CA: CPT

## 2019-12-05 PROCEDURE — 2580000003 HC RX 258: Performed by: INTERNAL MEDICINE

## 2019-12-05 PROCEDURE — 85027 COMPLETE CBC AUTOMATED: CPT

## 2019-12-05 RX ORDER — AMLODIPINE BESYLATE 2.5 MG/1
2.5 TABLET ORAL DAILY
Status: DISCONTINUED | OUTPATIENT
Start: 2019-12-05 | End: 2019-12-06 | Stop reason: HOSPADM

## 2019-12-05 RX ADMIN — INSULIN LISPRO 32 UNITS: 100 INJECTION, SOLUTION INTRAVENOUS; SUBCUTANEOUS at 18:17

## 2019-12-05 RX ADMIN — INSULIN GLARGINE 100 UNITS: 100 INJECTION, SOLUTION SUBCUTANEOUS at 20:10

## 2019-12-05 RX ADMIN — ACETAMINOPHEN 650 MG: 325 TABLET ORAL at 20:10

## 2019-12-05 RX ADMIN — GABAPENTIN 100 MG: 100 CAPSULE ORAL at 08:52

## 2019-12-05 RX ADMIN — PANTOPRAZOLE SODIUM 40 MG: 40 TABLET, DELAYED RELEASE ORAL at 08:52

## 2019-12-05 RX ADMIN — SERTRALINE 50 MG: 50 TABLET, FILM COATED ORAL at 14:13

## 2019-12-05 RX ADMIN — GABAPENTIN 100 MG: 100 CAPSULE ORAL at 20:16

## 2019-12-05 RX ADMIN — KETOROLAC TROMETHAMINE 15 MG: 30 INJECTION, SOLUTION INTRAMUSCULAR at 08:52

## 2019-12-05 RX ADMIN — ENOXAPARIN SODIUM 40 MG: 40 INJECTION SUBCUTANEOUS at 16:44

## 2019-12-05 RX ADMIN — SODIUM CHLORIDE: 9 INJECTION, SOLUTION INTRAVENOUS at 14:19

## 2019-12-05 RX ADMIN — INSULIN LISPRO 32 UNITS: 100 INJECTION, SOLUTION INTRAVENOUS; SUBCUTANEOUS at 14:13

## 2019-12-05 RX ADMIN — INSULIN LISPRO 32 UNITS: 100 INJECTION, SOLUTION INTRAVENOUS; SUBCUTANEOUS at 08:57

## 2019-12-05 RX ADMIN — VANCOMYCIN HYDROCHLORIDE 1500 MG: 5 INJECTION, POWDER, LYOPHILIZED, FOR SOLUTION INTRAVENOUS at 18:17

## 2019-12-05 RX ADMIN — VANCOMYCIN HYDROCHLORIDE 1500 MG: 5 INJECTION, POWDER, LYOPHILIZED, FOR SOLUTION INTRAVENOUS at 05:35

## 2019-12-05 RX ADMIN — INSULIN GLARGINE 100 UNITS: 100 INJECTION, SOLUTION SUBCUTANEOUS at 08:57

## 2019-12-05 RX ADMIN — PANTOPRAZOLE SODIUM 40 MG: 40 TABLET, DELAYED RELEASE ORAL at 05:35

## 2019-12-05 RX ADMIN — CEFEPIME 2 G: 2 INJECTION, POWDER, FOR SOLUTION INTRAMUSCULAR; INTRAVENOUS at 05:35

## 2019-12-05 RX ADMIN — AMLODIPINE BESYLATE 2.5 MG: 2.5 TABLET ORAL at 16:44

## 2019-12-05 ASSESSMENT — PAIN DESCRIPTION - PROGRESSION

## 2019-12-05 ASSESSMENT — PAIN SCALES - GENERAL
PAINLEVEL_OUTOF10: 2
PAINLEVEL_OUTOF10: 3
PAINLEVEL_OUTOF10: 8
PAINLEVEL_OUTOF10: 8

## 2019-12-05 ASSESSMENT — PAIN DESCRIPTION - ONSET
ONSET: ON-GOING
ONSET: ON-GOING

## 2019-12-05 ASSESSMENT — PAIN DESCRIPTION - LOCATION
LOCATION: FOOT
LOCATION: LEG

## 2019-12-05 ASSESSMENT — PAIN DESCRIPTION - ORIENTATION
ORIENTATION: LEFT
ORIENTATION: LEFT

## 2019-12-05 ASSESSMENT — PAIN DESCRIPTION - DESCRIPTORS
DESCRIPTORS: ACHING
DESCRIPTORS: ACHING

## 2019-12-05 ASSESSMENT — PAIN DESCRIPTION - PAIN TYPE
TYPE: ACUTE PAIN
TYPE: ACUTE PAIN

## 2019-12-05 ASSESSMENT — PAIN DESCRIPTION - FREQUENCY
FREQUENCY: CONTINUOUS
FREQUENCY: CONTINUOUS

## 2019-12-06 VITALS
TEMPERATURE: 97.9 F | SYSTOLIC BLOOD PRESSURE: 156 MMHG | WEIGHT: 225.6 LBS | HEIGHT: 66 IN | OXYGEN SATURATION: 100 % | RESPIRATION RATE: 18 BRPM | DIASTOLIC BLOOD PRESSURE: 68 MMHG | BODY MASS INDEX: 36.26 KG/M2 | HEART RATE: 96 BPM

## 2019-12-06 LAB
ANION GAP SERPL CALCULATED.3IONS-SCNC: 13 MEQ/L (ref 8–16)
BUN BLDV-MCNC: 15 MG/DL (ref 7–22)
CALCIUM SERPL-MCNC: 8.9 MG/DL (ref 8.5–10.5)
CHLORIDE BLD-SCNC: 102 MEQ/L (ref 98–111)
CO2: 22 MEQ/L (ref 23–33)
CREAT SERPL-MCNC: 0.7 MG/DL (ref 0.4–1.2)
ERYTHROCYTE [DISTWIDTH] IN BLOOD BY AUTOMATED COUNT: 14 % (ref 11.5–14.5)
ERYTHROCYTE [DISTWIDTH] IN BLOOD BY AUTOMATED COUNT: 45.1 FL (ref 35–45)
GFR SERPL CREATININE-BSD FRML MDRD: > 90 ML/MIN/1.73M2
GLUCOSE BLD-MCNC: 187 MG/DL (ref 70–108)
GLUCOSE BLD-MCNC: 223 MG/DL (ref 70–108)
HCT VFR BLD CALC: 40.8 % (ref 42–52)
HEMOGLOBIN: 13.7 GM/DL (ref 14–18)
MCH RBC QN AUTO: 29.7 PG (ref 26–33)
MCHC RBC AUTO-ENTMCNC: 33.6 GM/DL (ref 32.2–35.5)
MCV RBC AUTO: 88.3 FL (ref 80–94)
PLATELET # BLD: 218 THOU/MM3 (ref 130–400)
PMV BLD AUTO: 9.4 FL (ref 9.4–12.4)
POTASSIUM SERPL-SCNC: 3.9 MEQ/L (ref 3.5–5.2)
RBC # BLD: 4.62 MILL/MM3 (ref 4.7–6.1)
SODIUM BLD-SCNC: 137 MEQ/L (ref 135–145)
VANCOMYCIN TROUGH: 12.3 UG/ML (ref 5–15)
WBC # BLD: 9.8 THOU/MM3 (ref 4.8–10.8)

## 2019-12-06 PROCEDURE — 80202 ASSAY OF VANCOMYCIN: CPT

## 2019-12-06 PROCEDURE — 82948 REAGENT STRIP/BLOOD GLUCOSE: CPT

## 2019-12-06 PROCEDURE — 6360000002 HC RX W HCPCS: Performed by: PHYSICIAN ASSISTANT

## 2019-12-06 PROCEDURE — 80048 BASIC METABOLIC PNL TOTAL CA: CPT

## 2019-12-06 PROCEDURE — 36415 COLL VENOUS BLD VENIPUNCTURE: CPT

## 2019-12-06 PROCEDURE — 6360000002 HC RX W HCPCS: Performed by: INTERNAL MEDICINE

## 2019-12-06 PROCEDURE — 6370000000 HC RX 637 (ALT 250 FOR IP): Performed by: PHYSICIAN ASSISTANT

## 2019-12-06 PROCEDURE — 99238 HOSP IP/OBS DSCHRG MGMT 30/<: CPT | Performed by: PHYSICIAN ASSISTANT

## 2019-12-06 PROCEDURE — 2580000003 HC RX 258: Performed by: PHYSICIAN ASSISTANT

## 2019-12-06 PROCEDURE — 2580000003 HC RX 258: Performed by: INTERNAL MEDICINE

## 2019-12-06 PROCEDURE — 85027 COMPLETE CBC AUTOMATED: CPT

## 2019-12-06 RX ORDER — AMLODIPINE BESYLATE 2.5 MG/1
2.5 TABLET ORAL DAILY
Qty: 30 TABLET | Refills: 3 | Status: SHIPPED | OUTPATIENT
Start: 2019-12-07 | End: 2020-02-19 | Stop reason: SDUPTHER

## 2019-12-06 RX ORDER — PANTOPRAZOLE SODIUM 40 MG/1
40 TABLET, DELAYED RELEASE ORAL
Qty: 30 TABLET | Refills: 3 | Status: SHIPPED | OUTPATIENT
Start: 2019-12-07 | End: 2020-02-19 | Stop reason: SDUPTHER

## 2019-12-06 RX ORDER — SULFAMETHOXAZOLE AND TRIMETHOPRIM 800; 160 MG/1; MG/1
1 TABLET ORAL 2 TIMES DAILY
Qty: 14 TABLET | Refills: 0 | Status: SHIPPED | OUTPATIENT
Start: 2019-12-06 | End: 2019-12-13

## 2019-12-06 RX ADMIN — AMLODIPINE BESYLATE 2.5 MG: 2.5 TABLET ORAL at 08:37

## 2019-12-06 RX ADMIN — PANTOPRAZOLE SODIUM 40 MG: 40 TABLET, DELAYED RELEASE ORAL at 05:22

## 2019-12-06 RX ADMIN — SODIUM CHLORIDE: 9 INJECTION, SOLUTION INTRAVENOUS at 05:22

## 2019-12-06 RX ADMIN — SERTRALINE 50 MG: 50 TABLET, FILM COATED ORAL at 08:37

## 2019-12-06 RX ADMIN — INSULIN GLARGINE 100 UNITS: 100 INJECTION, SOLUTION SUBCUTANEOUS at 08:44

## 2019-12-06 RX ADMIN — VANCOMYCIN HYDROCHLORIDE 1500 MG: 5 INJECTION, POWDER, LYOPHILIZED, FOR SOLUTION INTRAVENOUS at 05:22

## 2019-12-06 RX ADMIN — KETOROLAC TROMETHAMINE 15 MG: 30 INJECTION, SOLUTION INTRAMUSCULAR at 08:37

## 2019-12-06 RX ADMIN — GABAPENTIN 100 MG: 100 CAPSULE ORAL at 08:37

## 2019-12-06 RX ADMIN — INSULIN LISPRO 32 UNITS: 100 INJECTION, SOLUTION INTRAVENOUS; SUBCUTANEOUS at 08:45

## 2019-12-06 ASSESSMENT — PAIN DESCRIPTION - LOCATION: LOCATION: LEG

## 2019-12-06 ASSESSMENT — PAIN DESCRIPTION - PROGRESSION
CLINICAL_PROGRESSION: GRADUALLY IMPROVING

## 2019-12-06 ASSESSMENT — PAIN DESCRIPTION - PAIN TYPE: TYPE: ACUTE PAIN

## 2019-12-06 ASSESSMENT — PAIN DESCRIPTION - ONSET: ONSET: ON-GOING

## 2019-12-06 ASSESSMENT — PAIN DESCRIPTION - FREQUENCY: FREQUENCY: CONTINUOUS

## 2019-12-06 ASSESSMENT — PAIN SCALES - GENERAL
PAINLEVEL_OUTOF10: 0
PAINLEVEL_OUTOF10: 9

## 2019-12-06 ASSESSMENT — PAIN DESCRIPTION - DESCRIPTORS: DESCRIPTORS: ACHING;SHARP

## 2019-12-06 ASSESSMENT — PAIN DESCRIPTION - ORIENTATION: ORIENTATION: LEFT

## 2019-12-07 ENCOUNTER — CARE COORDINATION (OUTPATIENT)
Dept: CASE MANAGEMENT | Age: 49
End: 2019-12-07

## 2019-12-08 ENCOUNTER — CARE COORDINATION (OUTPATIENT)
Dept: CASE MANAGEMENT | Age: 49
End: 2019-12-08

## 2019-12-09 ENCOUNTER — OFFICE VISIT (OUTPATIENT)
Dept: INTERNAL MEDICINE CLINIC | Age: 49
End: 2019-12-09
Payer: MEDICARE

## 2019-12-09 VITALS
BODY MASS INDEX: 36.25 KG/M2 | SYSTOLIC BLOOD PRESSURE: 122 MMHG | DIASTOLIC BLOOD PRESSURE: 80 MMHG | OXYGEN SATURATION: 99 % | HEART RATE: 99 BPM | WEIGHT: 225.53 LBS | RESPIRATION RATE: 14 BRPM | HEIGHT: 66 IN

## 2019-12-09 DIAGNOSIS — L89.890: Primary | ICD-10-CM

## 2019-12-09 DIAGNOSIS — M79.89 LEFT LEG SWELLING: ICD-10-CM

## 2019-12-09 DIAGNOSIS — G47.30 OBSERVED SLEEP APNEA: ICD-10-CM

## 2019-12-09 DIAGNOSIS — G62.9 NEUROPATHY: ICD-10-CM

## 2019-12-09 PROCEDURE — G8484 FLU IMMUNIZE NO ADMIN: HCPCS | Performed by: NURSE PRACTITIONER

## 2019-12-09 PROCEDURE — 99214 OFFICE O/P EST MOD 30 MIN: CPT | Performed by: NURSE PRACTITIONER

## 2019-12-09 PROCEDURE — 3017F COLORECTAL CA SCREEN DOC REV: CPT | Performed by: NURSE PRACTITIONER

## 2019-12-09 PROCEDURE — G8417 CALC BMI ABV UP PARAM F/U: HCPCS | Performed by: NURSE PRACTITIONER

## 2019-12-09 PROCEDURE — 1111F DSCHRG MED/CURRENT MED MERGE: CPT | Performed by: NURSE PRACTITIONER

## 2019-12-09 PROCEDURE — G8427 DOCREV CUR MEDS BY ELIG CLIN: HCPCS | Performed by: NURSE PRACTITIONER

## 2019-12-09 PROCEDURE — 1036F TOBACCO NON-USER: CPT | Performed by: NURSE PRACTITIONER

## 2019-12-10 LAB — BLOOD CULTURE, ROUTINE: NORMAL

## 2019-12-11 ENCOUNTER — HOSPITAL ENCOUNTER (OUTPATIENT)
Dept: WOUND CARE | Age: 49
Discharge: HOME | End: 2019-12-11
Payer: MEDICARE

## 2019-12-11 ENCOUNTER — TELEPHONE (OUTPATIENT)
Dept: INTERNAL MEDICINE CLINIC | Age: 49
End: 2019-12-11

## 2019-12-11 VITALS
DIASTOLIC BLOOD PRESSURE: 69 MMHG | HEART RATE: 105 BPM | OXYGEN SATURATION: 99 % | SYSTOLIC BLOOD PRESSURE: 146 MMHG | RESPIRATION RATE: 18 BRPM | TEMPERATURE: 98.2 F

## 2019-12-11 LAB — BLOOD CULTURE, ROUTINE: NORMAL

## 2019-12-11 PROCEDURE — 11720 DEBRIDE NAIL 1-5: CPT

## 2019-12-11 PROCEDURE — 2709999900 HC NON-CHARGEABLE SUPPLY

## 2019-12-11 ASSESSMENT — PAIN SCALES - GENERAL: PAINLEVEL_OUTOF10: 9

## 2019-12-12 ENCOUNTER — CARE COORDINATION (OUTPATIENT)
Dept: CARE COORDINATION | Age: 49
End: 2019-12-12

## 2019-12-13 ENCOUNTER — CARE COORDINATION (OUTPATIENT)
Dept: INTERNAL MEDICINE CLINIC | Age: 49
End: 2019-12-13

## 2019-12-13 DIAGNOSIS — Z79.4 UNCONTROLLED TYPE 2 DIABETES MELLITUS WITH HYPERGLYCEMIA, WITH LONG-TERM CURRENT USE OF INSULIN (HCC): Primary | Chronic | ICD-10-CM

## 2019-12-13 DIAGNOSIS — E11.65 UNCONTROLLED TYPE 2 DIABETES MELLITUS WITH HYPERGLYCEMIA, WITH LONG-TERM CURRENT USE OF INSULIN (HCC): Primary | Chronic | ICD-10-CM

## 2019-12-13 DIAGNOSIS — G62.9 NEUROPATHY: ICD-10-CM

## 2019-12-13 RX ORDER — GABAPENTIN 300 MG/1
300 CAPSULE ORAL 3 TIMES DAILY
Qty: 90 CAPSULE | Refills: 0 | Status: SHIPPED | OUTPATIENT
Start: 2019-12-13 | End: 2019-12-13

## 2019-12-13 RX ORDER — GABAPENTIN 300 MG/1
300 CAPSULE ORAL 3 TIMES DAILY
Qty: 90 CAPSULE | Refills: 0 | Status: SHIPPED | OUTPATIENT
Start: 2019-12-13 | End: 2019-12-30

## 2019-12-13 ASSESSMENT — ENCOUNTER SYMPTOMS: DYSPNEA ASSOCIATED WITH: EXERTION

## 2019-12-17 ENCOUNTER — CARE COORDINATION (OUTPATIENT)
Dept: CARE COORDINATION | Age: 49
End: 2019-12-17

## 2019-12-18 ENCOUNTER — OFFICE VISIT (OUTPATIENT)
Dept: INTERNAL MEDICINE CLINIC | Age: 49
End: 2019-12-18
Payer: MEDICARE

## 2019-12-18 ENCOUNTER — APPOINTMENT (OUTPATIENT)
Dept: GENERAL RADIOLOGY | Age: 49
DRG: 638 | End: 2019-12-18
Payer: MEDICARE

## 2019-12-18 ENCOUNTER — CARE COORDINATION (OUTPATIENT)
Dept: CARE COORDINATION | Age: 49
End: 2019-12-18

## 2019-12-18 ENCOUNTER — HOSPITAL ENCOUNTER (INPATIENT)
Age: 49
LOS: 4 days | Discharge: HOME | DRG: 638 | End: 2019-12-22
Attending: INTERNAL MEDICINE | Admitting: INTERNAL MEDICINE
Payer: MEDICARE

## 2019-12-18 ENCOUNTER — APPOINTMENT (OUTPATIENT)
Dept: INTERVENTIONAL RADIOLOGY/VASCULAR | Age: 49
DRG: 638 | End: 2019-12-18
Payer: MEDICARE

## 2019-12-18 ENCOUNTER — TELEPHONE (OUTPATIENT)
Dept: EMERGENCY DEPT | Age: 49
End: 2019-12-18

## 2019-12-18 VITALS
HEART RATE: 113 BPM | HEIGHT: 66 IN | TEMPERATURE: 98.2 F | WEIGHT: 225 LBS | DIASTOLIC BLOOD PRESSURE: 64 MMHG | SYSTOLIC BLOOD PRESSURE: 137 MMHG | BODY MASS INDEX: 36.16 KG/M2

## 2019-12-18 DIAGNOSIS — L97.922 ULCER OF LEFT LOWER LEG, WITH FAT LAYER EXPOSED (HCC): ICD-10-CM

## 2019-12-18 DIAGNOSIS — S78.111A ABOVE KNEE AMPUTATION OF RIGHT LOWER EXTREMITY (HCC): ICD-10-CM

## 2019-12-18 DIAGNOSIS — E11.65 UNCONTROLLED TYPE 2 DIABETES MELLITUS WITH HYPERGLYCEMIA, WITH LONG-TERM CURRENT USE OF INSULIN (HCC): ICD-10-CM

## 2019-12-18 DIAGNOSIS — L03.116 CELLULITIS OF LEFT LOWER EXTREMITY: Primary | ICD-10-CM

## 2019-12-18 DIAGNOSIS — Z79.4 UNCONTROLLED TYPE 2 DIABETES MELLITUS WITH HYPERGLYCEMIA, WITH LONG-TERM CURRENT USE OF INSULIN (HCC): ICD-10-CM

## 2019-12-18 DIAGNOSIS — L03.116 CELLULITIS OF LEFT LEG: Primary | ICD-10-CM

## 2019-12-18 DIAGNOSIS — M79.89 LEFT LEG SWELLING: ICD-10-CM

## 2019-12-18 DIAGNOSIS — R79.89 ELEVATED LACTIC ACID LEVEL: ICD-10-CM

## 2019-12-18 PROBLEM — L02.416 CELLULITIS AND ABSCESS OF LEFT LEG: Status: ACTIVE | Noted: 2019-12-18

## 2019-12-18 LAB
ALP BLD-CCNC: 69 U/L (ref 38–126)
ANION GAP SERPL CALCULATED.3IONS-SCNC: 16 MEQ/L (ref 8–16)
BASOPHILS # BLD: 1 %
BASOPHILS ABSOLUTE: 0.1 THOU/MM3 (ref 0–0.1)
BUN BLDV-MCNC: 16 MG/DL (ref 7–22)
CALCIUM SERPL-MCNC: 10.9 MG/DL (ref 8.5–10.5)
CHLORIDE BLD-SCNC: 90 MEQ/L (ref 98–111)
CO2: 26 MEQ/L (ref 23–33)
CREAT SERPL-MCNC: 0.9 MG/DL (ref 0.4–1.2)
EOSINOPHIL # BLD: 3 %
EOSINOPHILS ABSOLUTE: 0.4 THOU/MM3 (ref 0–0.4)
ERYTHROCYTE [DISTWIDTH] IN BLOOD BY AUTOMATED COUNT: 13.9 % (ref 11.5–14.5)
ERYTHROCYTE [DISTWIDTH] IN BLOOD BY AUTOMATED COUNT: 44.6 FL (ref 35–45)
GFR SERPL CREATININE-BSD FRML MDRD: 89 ML/MIN/1.73M2
GLUCOSE BLD-MCNC: 309 MG/DL (ref 70–108)
GLUCOSE BLD-MCNC: 432 MG/DL (ref 70–108)
HCT VFR BLD CALC: 47.7 % (ref 42–52)
HEMOGLOBIN: 15.9 GM/DL (ref 14–18)
IMMATURE GRANS (ABS): 0.13 THOU/MM3 (ref 0–0.07)
IMMATURE GRANULOCYTES: 1 %
LACTIC ACID, SEPSIS: 3.9 MMOL/L (ref 0.5–1.9)
LACTIC ACID, SEPSIS: 4.2 MMOL/L (ref 0.5–1.9)
LYMPHOCYTES # BLD: 27.7 %
LYMPHOCYTES ABSOLUTE: 3.6 THOU/MM3 (ref 1–4.8)
MCH RBC QN AUTO: 29.5 PG (ref 26–33)
MCHC RBC AUTO-ENTMCNC: 33.3 GM/DL (ref 32.2–35.5)
MCV RBC AUTO: 88.5 FL (ref 80–94)
MONOCYTES # BLD: 6.8 %
MONOCYTES ABSOLUTE: 0.9 THOU/MM3 (ref 0.4–1.3)
NUCLEATED RED BLOOD CELLS: 0 /100 WBC
OSMOLALITY CALCULATION: 277.4 MOSMOL/KG (ref 275–300)
PLATELET # BLD: 309 THOU/MM3 (ref 130–400)
PMV BLD AUTO: 9.7 FL (ref 9.4–12.4)
POTASSIUM SERPL-SCNC: 4.8 MEQ/L (ref 3.5–5.2)
PROCALCITONIN: 0.07 NG/ML (ref 0.01–0.09)
PROCALCITONIN: 0.07 NG/ML (ref 0.01–0.09)
RBC # BLD: 5.39 MILL/MM3 (ref 4.7–6.1)
SEG NEUTROPHILS: 60.5 %
SEGMENTED NEUTROPHILS ABSOLUTE COUNT: 7.9 THOU/MM3 (ref 1.8–7.7)
SODIUM BLD-SCNC: 132 MEQ/L (ref 135–145)
WBC # BLD: 13 THOU/MM3 (ref 4.8–10.8)

## 2019-12-18 PROCEDURE — 82948 REAGENT STRIP/BLOOD GLUCOSE: CPT

## 2019-12-18 PROCEDURE — 83605 ASSAY OF LACTIC ACID: CPT

## 2019-12-18 PROCEDURE — 80048 BASIC METABOLIC PNL TOTAL CA: CPT

## 2019-12-18 PROCEDURE — 6360000002 HC RX W HCPCS: Performed by: PHYSICIAN ASSISTANT

## 2019-12-18 PROCEDURE — 6370000000 HC RX 637 (ALT 250 FOR IP): Performed by: INTERNAL MEDICINE

## 2019-12-18 PROCEDURE — 2709999900 HC NON-CHARGEABLE SUPPLY

## 2019-12-18 PROCEDURE — 1200000003 HC TELEMETRY R&B

## 2019-12-18 PROCEDURE — 87040 BLOOD CULTURE FOR BACTERIA: CPT

## 2019-12-18 PROCEDURE — 83036 HEMOGLOBIN GLYCOSYLATED A1C: CPT

## 2019-12-18 PROCEDURE — 2580000003 HC RX 258: Performed by: PHYSICIAN ASSISTANT

## 2019-12-18 PROCEDURE — 99285 EMERGENCY DEPT VISIT HI MDM: CPT

## 2019-12-18 PROCEDURE — G8484 FLU IMMUNIZE NO ADMIN: HCPCS | Performed by: NURSE PRACTITIONER

## 2019-12-18 PROCEDURE — 85025 COMPLETE CBC W/AUTO DIFF WBC: CPT

## 2019-12-18 PROCEDURE — 3017F COLORECTAL CA SCREEN DOC REV: CPT | Performed by: NURSE PRACTITIONER

## 2019-12-18 PROCEDURE — 6370000000 HC RX 637 (ALT 250 FOR IP): Performed by: PHYSICIAN ASSISTANT

## 2019-12-18 PROCEDURE — 2580000003 HC RX 258: Performed by: INTERNAL MEDICINE

## 2019-12-18 PROCEDURE — 1111F DSCHRG MED/CURRENT MED MERGE: CPT | Performed by: NURSE PRACTITIONER

## 2019-12-18 PROCEDURE — 71046 X-RAY EXAM CHEST 2 VIEWS: CPT

## 2019-12-18 PROCEDURE — 84075 ASSAY ALKALINE PHOSPHATASE: CPT

## 2019-12-18 PROCEDURE — G8427 DOCREV CUR MEDS BY ELIG CLIN: HCPCS | Performed by: NURSE PRACTITIONER

## 2019-12-18 PROCEDURE — 36415 COLL VENOUS BLD VENIPUNCTURE: CPT

## 2019-12-18 PROCEDURE — 84145 PROCALCITONIN (PCT): CPT

## 2019-12-18 PROCEDURE — 99214 OFFICE O/P EST MOD 30 MIN: CPT | Performed by: NURSE PRACTITIONER

## 2019-12-18 PROCEDURE — G8417 CALC BMI ABV UP PARAM F/U: HCPCS | Performed by: NURSE PRACTITIONER

## 2019-12-18 PROCEDURE — 1036F TOBACCO NON-USER: CPT | Performed by: NURSE PRACTITIONER

## 2019-12-18 PROCEDURE — 3046F HEMOGLOBIN A1C LEVEL >9.0%: CPT | Performed by: NURSE PRACTITIONER

## 2019-12-18 PROCEDURE — 2022F DILAT RTA XM EVC RTNOPTHY: CPT | Performed by: NURSE PRACTITIONER

## 2019-12-18 PROCEDURE — 96365 THER/PROPH/DIAG IV INF INIT: CPT

## 2019-12-18 PROCEDURE — 93971 EXTREMITY STUDY: CPT

## 2019-12-18 PROCEDURE — 99223 1ST HOSP IP/OBS HIGH 75: CPT | Performed by: INTERNAL MEDICINE

## 2019-12-18 RX ORDER — DEXTROSE MONOHYDRATE 25 G/50ML
12.5 INJECTION, SOLUTION INTRAVENOUS PRN
Status: DISCONTINUED | OUTPATIENT
Start: 2019-12-18 | End: 2019-12-22 | Stop reason: HOSPADM

## 2019-12-18 RX ORDER — DEXTROSE MONOHYDRATE 50 MG/ML
100 INJECTION, SOLUTION INTRAVENOUS PRN
Status: DISCONTINUED | OUTPATIENT
Start: 2019-12-18 | End: 2019-12-22 | Stop reason: HOSPADM

## 2019-12-18 RX ORDER — SODIUM CHLORIDE 0.9 % (FLUSH) 0.9 %
10 SYRINGE (ML) INJECTION EVERY 12 HOURS SCHEDULED
Status: DISCONTINUED | OUTPATIENT
Start: 2019-12-18 | End: 2019-12-22 | Stop reason: HOSPADM

## 2019-12-18 RX ORDER — POTASSIUM CHLORIDE 20 MEQ/1
40 TABLET, EXTENDED RELEASE ORAL PRN
Status: DISCONTINUED | OUTPATIENT
Start: 2019-12-18 | End: 2019-12-22 | Stop reason: HOSPADM

## 2019-12-18 RX ORDER — ONDANSETRON 2 MG/ML
4 INJECTION INTRAMUSCULAR; INTRAVENOUS EVERY 6 HOURS PRN
Status: DISCONTINUED | OUTPATIENT
Start: 2019-12-18 | End: 2019-12-22 | Stop reason: HOSPADM

## 2019-12-18 RX ORDER — PANTOPRAZOLE SODIUM 40 MG/1
40 TABLET, DELAYED RELEASE ORAL
Status: DISCONTINUED | OUTPATIENT
Start: 2019-12-19 | End: 2019-12-22 | Stop reason: HOSPADM

## 2019-12-18 RX ORDER — NICOTINE POLACRILEX 4 MG
15 LOZENGE BUCCAL PRN
Status: DISCONTINUED | OUTPATIENT
Start: 2019-12-18 | End: 2019-12-22 | Stop reason: HOSPADM

## 2019-12-18 RX ORDER — POTASSIUM CHLORIDE 7.45 MG/ML
10 INJECTION INTRAVENOUS PRN
Status: DISCONTINUED | OUTPATIENT
Start: 2019-12-18 | End: 2019-12-22 | Stop reason: HOSPADM

## 2019-12-18 RX ORDER — GABAPENTIN 300 MG/1
300 CAPSULE ORAL 3 TIMES DAILY
Status: DISCONTINUED | OUTPATIENT
Start: 2019-12-18 | End: 2019-12-22 | Stop reason: HOSPADM

## 2019-12-18 RX ORDER — AMLODIPINE BESYLATE 2.5 MG/1
2.5 TABLET ORAL DAILY
Status: DISCONTINUED | OUTPATIENT
Start: 2019-12-19 | End: 2019-12-22 | Stop reason: HOSPADM

## 2019-12-18 RX ORDER — INSULIN GLARGINE 100 [IU]/ML
100 INJECTION, SOLUTION SUBCUTANEOUS 2 TIMES DAILY
Status: DISCONTINUED | OUTPATIENT
Start: 2019-12-18 | End: 2019-12-22 | Stop reason: HOSPADM

## 2019-12-18 RX ORDER — SODIUM CHLORIDE 9 MG/ML
INJECTION, SOLUTION INTRAVENOUS CONTINUOUS
Status: DISCONTINUED | OUTPATIENT
Start: 2019-12-18 | End: 2019-12-22 | Stop reason: HOSPADM

## 2019-12-18 RX ORDER — SODIUM CHLORIDE 0.9 % (FLUSH) 0.9 %
10 SYRINGE (ML) INJECTION PRN
Status: DISCONTINUED | OUTPATIENT
Start: 2019-12-18 | End: 2019-12-22 | Stop reason: HOSPADM

## 2019-12-18 RX ORDER — 0.9 % SODIUM CHLORIDE 0.9 %
1000 INTRAVENOUS SOLUTION INTRAVENOUS ONCE
Status: COMPLETED | OUTPATIENT
Start: 2019-12-18 | End: 2019-12-18

## 2019-12-18 RX ORDER — ACETAMINOPHEN 325 MG/1
650 TABLET ORAL EVERY 4 HOURS PRN
Status: DISCONTINUED | OUTPATIENT
Start: 2019-12-18 | End: 2019-12-22 | Stop reason: HOSPADM

## 2019-12-18 RX ORDER — POLYETHYLENE GLYCOL 3350 17 G/17G
17 POWDER, FOR SOLUTION ORAL DAILY PRN
Status: DISCONTINUED | OUTPATIENT
Start: 2019-12-18 | End: 2019-12-22 | Stop reason: HOSPADM

## 2019-12-18 RX ORDER — HYDROCODONE BITARTRATE AND ACETAMINOPHEN 5; 325 MG/1; MG/1
1 TABLET ORAL ONCE
Status: COMPLETED | OUTPATIENT
Start: 2019-12-18 | End: 2019-12-18

## 2019-12-18 RX ADMIN — Medication 10 ML: at 23:41

## 2019-12-18 RX ADMIN — INSULIN GLARGINE 100 UNITS: 100 INJECTION, SOLUTION SUBCUTANEOUS at 23:38

## 2019-12-18 RX ADMIN — SODIUM CHLORIDE: 9 INJECTION, SOLUTION INTRAVENOUS at 23:40

## 2019-12-18 RX ADMIN — HYDROCODONE BITARTRATE AND ACETAMINOPHEN 1 TABLET: 5; 325 TABLET ORAL at 18:08

## 2019-12-18 RX ADMIN — SODIUM CHLORIDE 1000 ML: 9 INJECTION, SOLUTION INTRAVENOUS at 19:19

## 2019-12-18 RX ADMIN — GABAPENTIN 300 MG: 300 CAPSULE ORAL at 23:39

## 2019-12-18 RX ADMIN — CEFTRIAXONE SODIUM 1 G: 1 INJECTION, POWDER, FOR SOLUTION INTRAMUSCULAR; INTRAVENOUS at 18:08

## 2019-12-18 ASSESSMENT — ENCOUNTER SYMPTOMS
SHORTNESS OF BREATH: 0
EYE REDNESS: 0
WHEEZING: 0
EYE DISCHARGE: 0
DIARRHEA: 0
ABDOMINAL PAIN: 0
VOMITING: 0
SORE THROAT: 0
RHINORRHEA: 0
NAUSEA: 0
BACK PAIN: 0
COUGH: 0

## 2019-12-18 ASSESSMENT — PAIN SCALES - GENERAL
PAINLEVEL_OUTOF10: 8
PAINLEVEL_OUTOF10: 2
PAINLEVEL_OUTOF10: 8
PAINLEVEL_OUTOF10: 8

## 2019-12-18 ASSESSMENT — PAIN DESCRIPTION - ORIENTATION: ORIENTATION: LEFT

## 2019-12-18 ASSESSMENT — PAIN DESCRIPTION - LOCATION: LOCATION: LEG

## 2019-12-18 ASSESSMENT — PAIN DESCRIPTION - PAIN TYPE
TYPE: ACUTE PAIN
TYPE: ACUTE PAIN

## 2019-12-19 ENCOUNTER — APPOINTMENT (OUTPATIENT)
Dept: CT IMAGING | Age: 49
DRG: 638 | End: 2019-12-19
Payer: MEDICARE

## 2019-12-19 LAB
ANION GAP SERPL CALCULATED.3IONS-SCNC: 13 MEQ/L (ref 8–16)
AVERAGE GLUCOSE: 282 MG/DL (ref 70–126)
BASOPHILS # BLD: 0.5 %
BASOPHILS ABSOLUTE: 0.1 THOU/MM3 (ref 0–0.1)
BUN BLDV-MCNC: 14 MG/DL (ref 7–22)
CALCIUM SERPL-MCNC: 8.9 MG/DL (ref 8.5–10.5)
CHLORIDE BLD-SCNC: 92 MEQ/L (ref 98–111)
CO2: 23 MEQ/L (ref 23–33)
CREAT SERPL-MCNC: 0.7 MG/DL (ref 0.4–1.2)
EOSINOPHIL # BLD: 3.3 %
EOSINOPHILS ABSOLUTE: 0.3 THOU/MM3 (ref 0–0.4)
ERYTHROCYTE [DISTWIDTH] IN BLOOD BY AUTOMATED COUNT: 13.9 % (ref 11.5–14.5)
ERYTHROCYTE [DISTWIDTH] IN BLOOD BY AUTOMATED COUNT: 44.4 FL (ref 35–45)
GFR SERPL CREATININE-BSD FRML MDRD: > 90 ML/MIN/1.73M2
GLUCOSE BLD-MCNC: 190 MG/DL (ref 70–108)
GLUCOSE BLD-MCNC: 212 MG/DL (ref 70–108)
GLUCOSE BLD-MCNC: 242 MG/DL (ref 70–108)
GLUCOSE BLD-MCNC: 260 MG/DL (ref 70–108)
GLUCOSE BLD-MCNC: 289 MG/DL (ref 70–108)
HBA1C MFR BLD: 11.4 % (ref 4.4–6.4)
HCT VFR BLD CALC: 42.3 % (ref 42–52)
HEMOGLOBIN: 14.2 GM/DL (ref 14–18)
IMMATURE GRANS (ABS): 0.09 THOU/MM3 (ref 0–0.07)
IMMATURE GRANULOCYTES: 0.8 %
LYMPHOCYTES # BLD: 34.7 %
LYMPHOCYTES ABSOLUTE: 3.7 THOU/MM3 (ref 1–4.8)
MAGNESIUM: 1.7 MG/DL (ref 1.6–2.4)
MCH RBC QN AUTO: 29.7 PG (ref 26–33)
MCHC RBC AUTO-ENTMCNC: 33.6 GM/DL (ref 32.2–35.5)
MCV RBC AUTO: 88.5 FL (ref 80–94)
MONOCYTES # BLD: 6.2 %
MONOCYTES ABSOLUTE: 0.7 THOU/MM3 (ref 0.4–1.3)
NUCLEATED RED BLOOD CELLS: 0 /100 WBC
PLATELET # BLD: 215 THOU/MM3 (ref 130–400)
PMV BLD AUTO: 9.5 FL (ref 9.4–12.4)
POTASSIUM REFLEX MAGNESIUM: 3.6 MEQ/L (ref 3.5–5.2)
RBC # BLD: 4.78 MILL/MM3 (ref 4.7–6.1)
SEG NEUTROPHILS: 54.5 %
SEGMENTED NEUTROPHILS ABSOLUTE COUNT: 5.8 THOU/MM3 (ref 1.8–7.7)
SODIUM BLD-SCNC: 128 MEQ/L (ref 135–145)
WBC # BLD: 10.6 THOU/MM3 (ref 4.8–10.8)

## 2019-12-19 PROCEDURE — 73700 CT LOWER EXTREMITY W/O DYE: CPT

## 2019-12-19 PROCEDURE — 85025 COMPLETE CBC W/AUTO DIFF WBC: CPT

## 2019-12-19 PROCEDURE — 80048 BASIC METABOLIC PNL TOTAL CA: CPT

## 2019-12-19 PROCEDURE — 94760 N-INVAS EAR/PLS OXIMETRY 1: CPT

## 2019-12-19 PROCEDURE — 6370000000 HC RX 637 (ALT 250 FOR IP): Performed by: INTERNAL MEDICINE

## 2019-12-19 PROCEDURE — 6360000002 HC RX W HCPCS: Performed by: NURSE PRACTITIONER

## 2019-12-19 PROCEDURE — 2580000003 HC RX 258: Performed by: INTERNAL MEDICINE

## 2019-12-19 PROCEDURE — 6360000002 HC RX W HCPCS: Performed by: PHYSICIAN ASSISTANT

## 2019-12-19 PROCEDURE — 83735 ASSAY OF MAGNESIUM: CPT

## 2019-12-19 PROCEDURE — 99232 SBSQ HOSP IP/OBS MODERATE 35: CPT | Performed by: NURSE PRACTITIONER

## 2019-12-19 PROCEDURE — 2709999900 HC NON-CHARGEABLE SUPPLY

## 2019-12-19 PROCEDURE — 36415 COLL VENOUS BLD VENIPUNCTURE: CPT

## 2019-12-19 PROCEDURE — 6370000000 HC RX 637 (ALT 250 FOR IP): Performed by: NURSE PRACTITIONER

## 2019-12-19 PROCEDURE — 1200000003 HC TELEMETRY R&B

## 2019-12-19 PROCEDURE — 6360000002 HC RX W HCPCS: Performed by: INTERNAL MEDICINE

## 2019-12-19 PROCEDURE — 82948 REAGENT STRIP/BLOOD GLUCOSE: CPT

## 2019-12-19 RX ORDER — DIPHENHYDRAMINE HYDROCHLORIDE 50 MG/ML
25 INJECTION INTRAMUSCULAR; INTRAVENOUS ONCE
Status: COMPLETED | OUTPATIENT
Start: 2019-12-19 | End: 2019-12-19

## 2019-12-19 RX ORDER — HYDROCODONE BITARTRATE AND ACETAMINOPHEN 5; 325 MG/1; MG/1
1 TABLET ORAL ONCE
Status: COMPLETED | OUTPATIENT
Start: 2019-12-19 | End: 2019-12-19

## 2019-12-19 RX ADMIN — GABAPENTIN 300 MG: 300 CAPSULE ORAL at 20:39

## 2019-12-19 RX ADMIN — INSULIN GLARGINE 100 UNITS: 100 INJECTION, SOLUTION SUBCUTANEOUS at 09:24

## 2019-12-19 RX ADMIN — INSULIN LISPRO 9 UNITS: 100 INJECTION, SOLUTION INTRAVENOUS; SUBCUTANEOUS at 00:34

## 2019-12-19 RX ADMIN — Medication 10 ML: at 20:40

## 2019-12-19 RX ADMIN — CEFTRIAXONE SODIUM 1 G: 1 INJECTION, POWDER, FOR SOLUTION INTRAMUSCULAR; INTRAVENOUS at 17:18

## 2019-12-19 RX ADMIN — PANTOPRAZOLE SODIUM 40 MG: 40 TABLET, DELAYED RELEASE ORAL at 06:23

## 2019-12-19 RX ADMIN — Medication 10 ML: at 09:12

## 2019-12-19 RX ADMIN — SERTRALINE 50 MG: 50 TABLET, FILM COATED ORAL at 09:11

## 2019-12-19 RX ADMIN — AMLODIPINE BESYLATE 2.5 MG: 2.5 TABLET ORAL at 09:11

## 2019-12-19 RX ADMIN — HYDROMORPHONE HYDROCHLORIDE 1 MG: 1 INJECTION, SOLUTION INTRAMUSCULAR; INTRAVENOUS; SUBCUTANEOUS at 09:23

## 2019-12-19 RX ADMIN — SODIUM CHLORIDE: 9 INJECTION, SOLUTION INTRAVENOUS at 17:18

## 2019-12-19 RX ADMIN — DIPHENHYDRAMINE HYDROCHLORIDE 25 MG: 50 INJECTION, SOLUTION INTRAMUSCULAR; INTRAVENOUS at 17:17

## 2019-12-19 RX ADMIN — VANCOMYCIN HYDROCHLORIDE 1500 MG: 5 INJECTION, POWDER, LYOPHILIZED, FOR SOLUTION INTRAVENOUS at 13:30

## 2019-12-19 RX ADMIN — INSULIN LISPRO 6 UNITS: 100 INJECTION, SOLUTION INTRAVENOUS; SUBCUTANEOUS at 09:25

## 2019-12-19 RX ADMIN — INSULIN LISPRO 3 UNITS: 100 INJECTION, SOLUTION INTRAVENOUS; SUBCUTANEOUS at 03:00

## 2019-12-19 RX ADMIN — GABAPENTIN 300 MG: 300 CAPSULE ORAL at 15:10

## 2019-12-19 RX ADMIN — INSULIN LISPRO 9 UNITS: 100 INJECTION, SOLUTION INTRAVENOUS; SUBCUTANEOUS at 13:28

## 2019-12-19 RX ADMIN — INSULIN LISPRO 3 UNITS: 100 INJECTION, SOLUTION INTRAVENOUS; SUBCUTANEOUS at 20:39

## 2019-12-19 RX ADMIN — HYDROCODONE BITARTRATE AND ACETAMINOPHEN 1 TABLET: 5; 325 TABLET ORAL at 17:52

## 2019-12-19 RX ADMIN — ENOXAPARIN SODIUM 40 MG: 40 INJECTION SUBCUTANEOUS at 09:23

## 2019-12-19 RX ADMIN — GABAPENTIN 300 MG: 300 CAPSULE ORAL at 09:11

## 2019-12-19 RX ADMIN — VANCOMYCIN HYDROCHLORIDE 1500 MG: 5 INJECTION, POWDER, LYOPHILIZED, FOR SOLUTION INTRAVENOUS at 00:35

## 2019-12-19 ASSESSMENT — PAIN DESCRIPTION - PAIN TYPE
TYPE: ACUTE PAIN
TYPE: ACUTE PAIN

## 2019-12-19 ASSESSMENT — PAIN DESCRIPTION - ONSET: ONSET: ON-GOING

## 2019-12-19 ASSESSMENT — ENCOUNTER SYMPTOMS
EYE ITCHING: 0
SORE THROAT: 0
NAUSEA: 0
ABDOMINAL PAIN: 0
TROUBLE SWALLOWING: 0
CONSTIPATION: 0
CHOKING: 0
VOMITING: 0
DIARRHEA: 0
COUGH: 0
COLOR CHANGE: 1
SHORTNESS OF BREATH: 0

## 2019-12-19 ASSESSMENT — PAIN DESCRIPTION - PROGRESSION
CLINICAL_PROGRESSION: GRADUALLY IMPROVING

## 2019-12-19 ASSESSMENT — PAIN DESCRIPTION - DESCRIPTORS: DESCRIPTORS: ACHING

## 2019-12-19 ASSESSMENT — PAIN SCALES - GENERAL
PAINLEVEL_OUTOF10: 9
PAINLEVEL_OUTOF10: 7
PAINLEVEL_OUTOF10: 0

## 2019-12-19 ASSESSMENT — PAIN DESCRIPTION - ORIENTATION
ORIENTATION: LEFT
ORIENTATION: LEFT

## 2019-12-19 ASSESSMENT — PAIN DESCRIPTION - LOCATION
LOCATION: LEG
LOCATION: LEG

## 2019-12-19 ASSESSMENT — PAIN DESCRIPTION - FREQUENCY: FREQUENCY: CONTINUOUS

## 2019-12-20 LAB
GLUCOSE BLD-MCNC: 209 MG/DL (ref 70–108)
GLUCOSE BLD-MCNC: 228 MG/DL (ref 70–108)
GLUCOSE BLD-MCNC: 241 MG/DL (ref 70–108)
GLUCOSE BLD-MCNC: 354 MG/DL (ref 70–108)
VANCOMYCIN TROUGH: 10.3 UG/ML (ref 5–15)

## 2019-12-20 PROCEDURE — 99232 SBSQ HOSP IP/OBS MODERATE 35: CPT | Performed by: NURSE PRACTITIONER

## 2019-12-20 PROCEDURE — 6360000002 HC RX W HCPCS: Performed by: INTERNAL MEDICINE

## 2019-12-20 PROCEDURE — 6370000000 HC RX 637 (ALT 250 FOR IP): Performed by: NURSE PRACTITIONER

## 2019-12-20 PROCEDURE — 6360000002 HC RX W HCPCS: Performed by: NURSE PRACTITIONER

## 2019-12-20 PROCEDURE — 1200000003 HC TELEMETRY R&B

## 2019-12-20 PROCEDURE — 36415 COLL VENOUS BLD VENIPUNCTURE: CPT

## 2019-12-20 PROCEDURE — 82948 REAGENT STRIP/BLOOD GLUCOSE: CPT

## 2019-12-20 PROCEDURE — 6370000000 HC RX 637 (ALT 250 FOR IP): Performed by: INTERNAL MEDICINE

## 2019-12-20 PROCEDURE — 2709999900 HC NON-CHARGEABLE SUPPLY

## 2019-12-20 PROCEDURE — 2580000003 HC RX 258: Performed by: INTERNAL MEDICINE

## 2019-12-20 PROCEDURE — 80202 ASSAY OF VANCOMYCIN: CPT

## 2019-12-20 RX ORDER — DIPHENHYDRAMINE HYDROCHLORIDE 50 MG/ML
25 INJECTION INTRAMUSCULAR; INTRAVENOUS EVERY 6 HOURS PRN
Status: DISCONTINUED | OUTPATIENT
Start: 2019-12-20 | End: 2019-12-22 | Stop reason: HOSPADM

## 2019-12-20 RX ORDER — HYDROCODONE BITARTRATE AND ACETAMINOPHEN 5; 325 MG/1; MG/1
1 TABLET ORAL EVERY 6 HOURS PRN
Status: DISCONTINUED | OUTPATIENT
Start: 2019-12-20 | End: 2019-12-22 | Stop reason: HOSPADM

## 2019-12-20 RX ADMIN — DIPHENHYDRAMINE HYDROCHLORIDE 25 MG: 50 INJECTION INTRAMUSCULAR; INTRAVENOUS at 11:06

## 2019-12-20 RX ADMIN — VANCOMYCIN HYDROCHLORIDE 1500 MG: 5 INJECTION, POWDER, LYOPHILIZED, FOR SOLUTION INTRAVENOUS at 00:00

## 2019-12-20 RX ADMIN — AMLODIPINE BESYLATE 2.5 MG: 2.5 TABLET ORAL at 08:35

## 2019-12-20 RX ADMIN — DIPHENHYDRAMINE HYDROCHLORIDE 25 MG: 50 INJECTION INTRAMUSCULAR; INTRAVENOUS at 18:45

## 2019-12-20 RX ADMIN — INSULIN LISPRO 7 UNITS: 100 INJECTION, SOLUTION INTRAVENOUS; SUBCUTANEOUS at 21:11

## 2019-12-20 RX ADMIN — INSULIN LISPRO 6 UNITS: 100 INJECTION, SOLUTION INTRAVENOUS; SUBCUTANEOUS at 10:09

## 2019-12-20 RX ADMIN — SERTRALINE 50 MG: 50 TABLET, FILM COATED ORAL at 08:35

## 2019-12-20 RX ADMIN — ENOXAPARIN SODIUM 40 MG: 40 INJECTION SUBCUTANEOUS at 08:35

## 2019-12-20 RX ADMIN — SODIUM CHLORIDE: 9 INJECTION, SOLUTION INTRAVENOUS at 12:49

## 2019-12-20 RX ADMIN — HYDROCODONE BITARTRATE AND ACETAMINOPHEN 1 TABLET: 5; 325 TABLET ORAL at 18:45

## 2019-12-20 RX ADMIN — CEFTRIAXONE SODIUM 1 G: 1 INJECTION, POWDER, FOR SOLUTION INTRAMUSCULAR; INTRAVENOUS at 18:45

## 2019-12-20 RX ADMIN — ACETAMINOPHEN 650 MG: 325 TABLET ORAL at 08:35

## 2019-12-20 RX ADMIN — GABAPENTIN 300 MG: 300 CAPSULE ORAL at 14:52

## 2019-12-20 RX ADMIN — INSULIN GLARGINE 100 UNITS: 100 INJECTION, SOLUTION SUBCUTANEOUS at 21:10

## 2019-12-20 RX ADMIN — INSULIN GLARGINE 100 UNITS: 100 INJECTION, SOLUTION SUBCUTANEOUS at 10:09

## 2019-12-20 RX ADMIN — GABAPENTIN 300 MG: 300 CAPSULE ORAL at 21:10

## 2019-12-20 RX ADMIN — INSULIN LISPRO 6 UNITS: 100 INJECTION, SOLUTION INTRAVENOUS; SUBCUTANEOUS at 12:53

## 2019-12-20 RX ADMIN — GABAPENTIN 300 MG: 300 CAPSULE ORAL at 08:35

## 2019-12-20 RX ADMIN — PANTOPRAZOLE SODIUM 40 MG: 40 TABLET, DELAYED RELEASE ORAL at 06:13

## 2019-12-20 RX ADMIN — INSULIN LISPRO 6 UNITS: 100 INJECTION, SOLUTION INTRAVENOUS; SUBCUTANEOUS at 18:45

## 2019-12-20 RX ADMIN — HYDROCODONE BITARTRATE AND ACETAMINOPHEN 1 TABLET: 5; 325 TABLET ORAL at 11:06

## 2019-12-20 RX ADMIN — VANCOMYCIN HYDROCHLORIDE 1500 MG: 5 INJECTION, POWDER, LYOPHILIZED, FOR SOLUTION INTRAVENOUS at 12:49

## 2019-12-20 ASSESSMENT — PAIN DESCRIPTION - PROGRESSION
CLINICAL_PROGRESSION: GRADUALLY IMPROVING
CLINICAL_PROGRESSION: GRADUALLY IMPROVING
CLINICAL_PROGRESSION: GRADUALLY WORSENING
CLINICAL_PROGRESSION: GRADUALLY IMPROVING
CLINICAL_PROGRESSION: GRADUALLY IMPROVING
CLINICAL_PROGRESSION: GRADUALLY WORSENING
CLINICAL_PROGRESSION: GRADUALLY WORSENING
CLINICAL_PROGRESSION: GRADUALLY IMPROVING
CLINICAL_PROGRESSION: GRADUALLY IMPROVING

## 2019-12-20 ASSESSMENT — PAIN - FUNCTIONAL ASSESSMENT
PAIN_FUNCTIONAL_ASSESSMENT: ACTIVITIES ARE NOT PREVENTED

## 2019-12-20 ASSESSMENT — PAIN DESCRIPTION - LOCATION
LOCATION: LEG

## 2019-12-20 ASSESSMENT — PAIN DESCRIPTION - ONSET
ONSET: ON-GOING

## 2019-12-20 ASSESSMENT — PAIN DESCRIPTION - PAIN TYPE
TYPE: ACUTE PAIN

## 2019-12-20 ASSESSMENT — PAIN SCALES - GENERAL
PAINLEVEL_OUTOF10: 6
PAINLEVEL_OUTOF10: 4
PAINLEVEL_OUTOF10: 5
PAINLEVEL_OUTOF10: 8
PAINLEVEL_OUTOF10: 5
PAINLEVEL_OUTOF10: 4
PAINLEVEL_OUTOF10: 8
PAINLEVEL_OUTOF10: 7
PAINLEVEL_OUTOF10: 8

## 2019-12-20 ASSESSMENT — PAIN DESCRIPTION - ORIENTATION
ORIENTATION: LEFT

## 2019-12-20 ASSESSMENT — PAIN DESCRIPTION - FREQUENCY
FREQUENCY: CONTINUOUS

## 2019-12-20 ASSESSMENT — PAIN DESCRIPTION - DESCRIPTORS
DESCRIPTORS: SHARP
DESCRIPTORS: ACHING
DESCRIPTORS: SHARP

## 2019-12-21 PROBLEM — L03.116 CELLULITIS AND ABSCESS OF LEFT LEG: Status: RESOLVED | Noted: 2019-12-18 | Resolved: 2019-12-21

## 2019-12-21 PROBLEM — L02.416 CELLULITIS AND ABSCESS OF LEFT LEG: Status: RESOLVED | Noted: 2019-12-18 | Resolved: 2019-12-21

## 2019-12-21 LAB
GLUCOSE BLD-MCNC: 166 MG/DL (ref 70–108)
GLUCOSE BLD-MCNC: 206 MG/DL (ref 70–108)
GLUCOSE BLD-MCNC: 223 MG/DL (ref 70–108)
GLUCOSE BLD-MCNC: 258 MG/DL (ref 70–108)

## 2019-12-21 PROCEDURE — 6370000000 HC RX 637 (ALT 250 FOR IP): Performed by: INTERNAL MEDICINE

## 2019-12-21 PROCEDURE — 1200000003 HC TELEMETRY R&B

## 2019-12-21 PROCEDURE — 99233 SBSQ HOSP IP/OBS HIGH 50: CPT | Performed by: INTERNAL MEDICINE

## 2019-12-21 PROCEDURE — 6370000000 HC RX 637 (ALT 250 FOR IP): Performed by: NURSE PRACTITIONER

## 2019-12-21 PROCEDURE — 2580000003 HC RX 258: Performed by: INTERNAL MEDICINE

## 2019-12-21 PROCEDURE — 2709999900 HC NON-CHARGEABLE SUPPLY

## 2019-12-21 PROCEDURE — 94760 N-INVAS EAR/PLS OXIMETRY 1: CPT

## 2019-12-21 PROCEDURE — 82948 REAGENT STRIP/BLOOD GLUCOSE: CPT

## 2019-12-21 PROCEDURE — 6360000002 HC RX W HCPCS: Performed by: INTERNAL MEDICINE

## 2019-12-21 RX ORDER — CEPHALEXIN 500 MG/1
500 CAPSULE ORAL 3 TIMES DAILY
Qty: 30 CAPSULE | Refills: 0 | Status: SHIPPED | OUTPATIENT
Start: 2019-12-21 | End: 2019-12-22 | Stop reason: HOSPADM

## 2019-12-21 RX ORDER — PIOGLITAZONEHYDROCHLORIDE 15 MG/1
15 TABLET ORAL DAILY
Status: DISCONTINUED | OUTPATIENT
Start: 2019-12-21 | End: 2019-12-22 | Stop reason: HOSPADM

## 2019-12-21 RX ADMIN — SODIUM CHLORIDE: 9 INJECTION, SOLUTION INTRAVENOUS at 12:03

## 2019-12-21 RX ADMIN — GABAPENTIN 300 MG: 300 CAPSULE ORAL at 14:05

## 2019-12-21 RX ADMIN — SERTRALINE 50 MG: 50 TABLET, FILM COATED ORAL at 09:16

## 2019-12-21 RX ADMIN — AMLODIPINE BESYLATE 2.5 MG: 2.5 TABLET ORAL at 09:16

## 2019-12-21 RX ADMIN — HYDROCODONE BITARTRATE AND ACETAMINOPHEN 1 TABLET: 5; 325 TABLET ORAL at 09:16

## 2019-12-21 RX ADMIN — INSULIN GLARGINE 100 UNITS: 100 INJECTION, SOLUTION SUBCUTANEOUS at 09:18

## 2019-12-21 RX ADMIN — INSULIN LISPRO 6 UNITS: 100 INJECTION, SOLUTION INTRAVENOUS; SUBCUTANEOUS at 18:40

## 2019-12-21 RX ADMIN — ENOXAPARIN SODIUM 40 MG: 40 INJECTION SUBCUTANEOUS at 09:16

## 2019-12-21 RX ADMIN — LINAGLIPTIN 5 MG: 5 TABLET, FILM COATED ORAL at 14:05

## 2019-12-21 RX ADMIN — VANCOMYCIN HYDROCHLORIDE 1500 MG: 5 INJECTION, POWDER, LYOPHILIZED, FOR SOLUTION INTRAVENOUS at 00:15

## 2019-12-21 RX ADMIN — GABAPENTIN 300 MG: 300 CAPSULE ORAL at 20:50

## 2019-12-21 RX ADMIN — INSULIN LISPRO 2 UNITS: 100 INJECTION, SOLUTION INTRAVENOUS; SUBCUTANEOUS at 20:49

## 2019-12-21 RX ADMIN — PANTOPRAZOLE SODIUM 40 MG: 40 TABLET, DELAYED RELEASE ORAL at 06:30

## 2019-12-21 RX ADMIN — INSULIN LISPRO 9 UNITS: 100 INJECTION, SOLUTION INTRAVENOUS; SUBCUTANEOUS at 14:04

## 2019-12-21 RX ADMIN — INSULIN GLARGINE 100 UNITS: 100 INJECTION, SOLUTION SUBCUTANEOUS at 20:48

## 2019-12-21 RX ADMIN — PIOGLITAZONE 15 MG: 15 TABLET ORAL at 14:05

## 2019-12-21 RX ADMIN — HYDROCODONE BITARTRATE AND ACETAMINOPHEN 1 TABLET: 5; 325 TABLET ORAL at 16:53

## 2019-12-21 RX ADMIN — INSULIN LISPRO 6 UNITS: 100 INJECTION, SOLUTION INTRAVENOUS; SUBCUTANEOUS at 09:18

## 2019-12-21 RX ADMIN — GABAPENTIN 300 MG: 300 CAPSULE ORAL at 09:16

## 2019-12-21 RX ADMIN — CEFTRIAXONE SODIUM 1 G: 1 INJECTION, POWDER, FOR SOLUTION INTRAMUSCULAR; INTRAVENOUS at 18:41

## 2019-12-21 ASSESSMENT — PAIN DESCRIPTION - FREQUENCY
FREQUENCY: CONTINUOUS

## 2019-12-21 ASSESSMENT — PAIN - FUNCTIONAL ASSESSMENT
PAIN_FUNCTIONAL_ASSESSMENT: ACTIVITIES ARE NOT PREVENTED

## 2019-12-21 ASSESSMENT — PAIN DESCRIPTION - ONSET
ONSET: ON-GOING

## 2019-12-21 ASSESSMENT — PAIN DESCRIPTION - PROGRESSION
CLINICAL_PROGRESSION: RAPIDLY WORSENING
CLINICAL_PROGRESSION: GRADUALLY IMPROVING
CLINICAL_PROGRESSION: GRADUALLY IMPROVING
CLINICAL_PROGRESSION: RAPIDLY WORSENING
CLINICAL_PROGRESSION: GRADUALLY IMPROVING

## 2019-12-21 ASSESSMENT — PAIN DESCRIPTION - DESCRIPTORS
DESCRIPTORS: SHARP

## 2019-12-21 ASSESSMENT — PAIN DESCRIPTION - LOCATION
LOCATION: LEG

## 2019-12-21 ASSESSMENT — PAIN DESCRIPTION - ORIENTATION
ORIENTATION: LEFT

## 2019-12-21 ASSESSMENT — PAIN SCALES - GENERAL
PAINLEVEL_OUTOF10: 8
PAINLEVEL_OUTOF10: 6
PAINLEVEL_OUTOF10: 9
PAINLEVEL_OUTOF10: 6
PAINLEVEL_OUTOF10: 6

## 2019-12-21 ASSESSMENT — PAIN DESCRIPTION - PAIN TYPE
TYPE: ACUTE PAIN

## 2019-12-22 VITALS
HEIGHT: 66 IN | RESPIRATION RATE: 18 BRPM | DIASTOLIC BLOOD PRESSURE: 89 MMHG | BODY MASS INDEX: 35.58 KG/M2 | WEIGHT: 221.4 LBS | HEART RATE: 92 BPM | SYSTOLIC BLOOD PRESSURE: 134 MMHG | OXYGEN SATURATION: 97 % | TEMPERATURE: 98 F

## 2019-12-22 LAB — GLUCOSE BLD-MCNC: 174 MG/DL (ref 70–108)

## 2019-12-22 PROCEDURE — 82948 REAGENT STRIP/BLOOD GLUCOSE: CPT

## 2019-12-22 PROCEDURE — 6370000000 HC RX 637 (ALT 250 FOR IP): Performed by: INTERNAL MEDICINE

## 2019-12-22 PROCEDURE — 6370000000 HC RX 637 (ALT 250 FOR IP): Performed by: NURSE PRACTITIONER

## 2019-12-22 PROCEDURE — 6360000002 HC RX W HCPCS: Performed by: INTERNAL MEDICINE

## 2019-12-22 PROCEDURE — 2580000003 HC RX 258: Performed by: INTERNAL MEDICINE

## 2019-12-22 RX ORDER — DOXYCYCLINE HYCLATE 100 MG
100 TABLET ORAL 2 TIMES DAILY
Qty: 14 TABLET | Refills: 0 | Status: SHIPPED | OUTPATIENT
Start: 2019-12-22 | End: 2019-12-29

## 2019-12-22 RX ORDER — PIOGLITAZONEHYDROCHLORIDE 15 MG/1
15 TABLET ORAL DAILY
Qty: 30 TABLET | Refills: 0 | Status: SHIPPED | OUTPATIENT
Start: 2019-12-23 | End: 2020-01-22

## 2019-12-22 RX ADMIN — INSULIN LISPRO 3 UNITS: 100 INJECTION, SOLUTION INTRAVENOUS; SUBCUTANEOUS at 09:03

## 2019-12-22 RX ADMIN — PIOGLITAZONE 15 MG: 15 TABLET ORAL at 09:03

## 2019-12-22 RX ADMIN — GABAPENTIN 300 MG: 300 CAPSULE ORAL at 09:03

## 2019-12-22 RX ADMIN — LINAGLIPTIN 5 MG: 5 TABLET, FILM COATED ORAL at 09:03

## 2019-12-22 RX ADMIN — INSULIN GLARGINE 100 UNITS: 100 INJECTION, SOLUTION SUBCUTANEOUS at 09:03

## 2019-12-22 RX ADMIN — AMLODIPINE BESYLATE 2.5 MG: 2.5 TABLET ORAL at 09:03

## 2019-12-22 RX ADMIN — SERTRALINE 50 MG: 50 TABLET, FILM COATED ORAL at 09:03

## 2019-12-22 RX ADMIN — PANTOPRAZOLE SODIUM 40 MG: 40 TABLET, DELAYED RELEASE ORAL at 06:39

## 2019-12-22 RX ADMIN — ENOXAPARIN SODIUM 40 MG: 40 INJECTION SUBCUTANEOUS at 09:03

## 2019-12-22 RX ADMIN — SODIUM CHLORIDE: 9 INJECTION, SOLUTION INTRAVENOUS at 09:04

## 2019-12-22 RX ADMIN — HYDROCODONE BITARTRATE AND ACETAMINOPHEN 1 TABLET: 5; 325 TABLET ORAL at 09:03

## 2019-12-22 ASSESSMENT — PAIN DESCRIPTION - ORIENTATION: ORIENTATION: LEFT

## 2019-12-22 ASSESSMENT — PAIN DESCRIPTION - PROGRESSION
CLINICAL_PROGRESSION: GRADUALLY IMPROVING

## 2019-12-22 ASSESSMENT — PAIN DESCRIPTION - DESCRIPTORS: DESCRIPTORS: SHARP

## 2019-12-22 ASSESSMENT — PAIN - FUNCTIONAL ASSESSMENT: PAIN_FUNCTIONAL_ASSESSMENT: ACTIVITIES ARE NOT PREVENTED

## 2019-12-22 ASSESSMENT — PAIN DESCRIPTION - LOCATION: LOCATION: LEG

## 2019-12-22 ASSESSMENT — PAIN SCALES - GENERAL
PAINLEVEL_OUTOF10: 7
PAINLEVEL_OUTOF10: 4

## 2019-12-22 ASSESSMENT — PAIN DESCRIPTION - FREQUENCY: FREQUENCY: CONTINUOUS

## 2019-12-22 ASSESSMENT — PAIN DESCRIPTION - PAIN TYPE: TYPE: ACUTE PAIN

## 2019-12-22 ASSESSMENT — PAIN DESCRIPTION - ONSET: ONSET: ON-GOING

## 2019-12-23 ENCOUNTER — CARE COORDINATION (OUTPATIENT)
Dept: CASE MANAGEMENT | Age: 49
End: 2019-12-23

## 2019-12-24 ENCOUNTER — CARE COORDINATION (OUTPATIENT)
Dept: CASE MANAGEMENT | Age: 49
End: 2019-12-24

## 2019-12-24 LAB
BLOOD CULTURE, ROUTINE: NORMAL
BLOOD CULTURE, ROUTINE: NORMAL

## 2019-12-26 ENCOUNTER — CARE COORDINATION (OUTPATIENT)
Dept: CARE COORDINATION | Age: 49
End: 2019-12-26

## 2019-12-26 ASSESSMENT — ENCOUNTER SYMPTOMS: DYSPNEA ASSOCIATED WITH: EXERTION

## 2019-12-29 ASSESSMENT — ENCOUNTER SYMPTOMS
SORE THROAT: 0
SHORTNESS OF BREATH: 0
COUGH: 0
NAUSEA: 0
DIARRHEA: 0
ABDOMINAL PAIN: 0
TROUBLE SWALLOWING: 0
EYE ITCHING: 0
CHOKING: 0
VOMITING: 0
CONSTIPATION: 0

## 2019-12-30 ENCOUNTER — OFFICE VISIT (OUTPATIENT)
Dept: INTERNAL MEDICINE CLINIC | Age: 49
End: 2019-12-30
Payer: MEDICARE

## 2019-12-30 VITALS
HEART RATE: 92 BPM | BODY MASS INDEX: 35.52 KG/M2 | HEIGHT: 66 IN | WEIGHT: 221 LBS | DIASTOLIC BLOOD PRESSURE: 64 MMHG | SYSTOLIC BLOOD PRESSURE: 130 MMHG

## 2019-12-30 DIAGNOSIS — H54.7 VISUAL ACUITY REDUCED: ICD-10-CM

## 2019-12-30 DIAGNOSIS — E11.65 UNCONTROLLED TYPE 2 DIABETES MELLITUS WITH HYPERGLYCEMIA, WITH LONG-TERM CURRENT USE OF INSULIN (HCC): Primary | ICD-10-CM

## 2019-12-30 DIAGNOSIS — E78.5 HYPERLIPIDEMIA ASSOCIATED WITH TYPE 2 DIABETES MELLITUS (HCC): ICD-10-CM

## 2019-12-30 DIAGNOSIS — G62.9 NEUROPATHY: ICD-10-CM

## 2019-12-30 DIAGNOSIS — E11.69 HYPERLIPIDEMIA ASSOCIATED WITH TYPE 2 DIABETES MELLITUS (HCC): ICD-10-CM

## 2019-12-30 DIAGNOSIS — Z79.4 UNCONTROLLED TYPE 2 DIABETES MELLITUS WITH HYPERGLYCEMIA, WITH LONG-TERM CURRENT USE OF INSULIN (HCC): Primary | ICD-10-CM

## 2019-12-30 DIAGNOSIS — I10 ESSENTIAL HYPERTENSION: ICD-10-CM

## 2019-12-30 DIAGNOSIS — M62.541 MUSCLE WASTING AND ATROPHY, NOT ELSEWHERE CLASSIFIED, RIGHT HAND: ICD-10-CM

## 2019-12-30 DIAGNOSIS — M25.511 ACUTE PAIN OF RIGHT SHOULDER: ICD-10-CM

## 2019-12-30 DIAGNOSIS — L03.116 CELLULITIS OF LEFT LEG: ICD-10-CM

## 2019-12-30 DIAGNOSIS — L97.922 ULCER OF LEFT LOWER LEG, WITH FAT LAYER EXPOSED (HCC): ICD-10-CM

## 2019-12-30 PROCEDURE — 99495 TRANSJ CARE MGMT MOD F2F 14D: CPT | Performed by: NURSE PRACTITIONER

## 2019-12-30 PROCEDURE — 1111F DSCHRG MED/CURRENT MED MERGE: CPT | Performed by: NURSE PRACTITIONER

## 2019-12-30 RX ORDER — DOXYCYCLINE HYCLATE 100 MG/1
100 CAPSULE ORAL 2 TIMES DAILY
COMMUNITY
End: 2020-01-08 | Stop reason: ALTCHOICE

## 2019-12-30 ASSESSMENT — ENCOUNTER SYMPTOMS
DIARRHEA: 0
SHORTNESS OF BREATH: 1
TROUBLE SWALLOWING: 0
VOMITING: 0
ABDOMINAL DISTENTION: 0
SORE THROAT: 0
COUGH: 0
NAUSEA: 0

## 2020-01-04 ENCOUNTER — HOSPITAL ENCOUNTER (EMERGENCY)
Age: 50
Discharge: HOME | End: 2020-01-04
Attending: EMERGENCY MEDICINE
Payer: MEDICARE

## 2020-01-04 VITALS
BODY MASS INDEX: 35.52 KG/M2 | WEIGHT: 221 LBS | TEMPERATURE: 98.2 F | SYSTOLIC BLOOD PRESSURE: 144 MMHG | OXYGEN SATURATION: 98 % | HEIGHT: 66 IN | RESPIRATION RATE: 17 BRPM | HEART RATE: 98 BPM | DIASTOLIC BLOOD PRESSURE: 73 MMHG

## 2020-01-04 LAB
GLUCOSE BLD-MCNC: 130 MG/DL (ref 70–108)
GLUCOSE BLD-MCNC: 196 MG/DL (ref 70–108)

## 2020-01-04 PROCEDURE — 82948 REAGENT STRIP/BLOOD GLUCOSE: CPT

## 2020-01-04 PROCEDURE — 99285 EMERGENCY DEPT VISIT HI MDM: CPT

## 2020-01-04 ASSESSMENT — ENCOUNTER SYMPTOMS
VOMITING: 0
NAUSEA: 0
ABDOMINAL PAIN: 0
SHORTNESS OF BREATH: 0

## 2020-01-04 NOTE — ED TRIAGE NOTES
Pt comes to the ED via EMS for low blood sugar. Pt states he took his normal insulin today but started to feel shaky. EMS got a blood sugar of 106 en route. Pt states he's normally 300-400. Pt given oral glucose and states he feels better. Pt still eating oral glucose.

## 2020-01-04 NOTE — ED PROVIDER NOTES
that includes other surgical history (Right, 1/14/15); Abscess Drainage (Right); Toe amputation (Right, 1/16/15); Foot Debridement (Right, 07/01/2016); Leg amputation below knee (Right, 07/20/2016); [place] tooth extraction (?when); pr drain infect shoulder bursa (Left, 8/18/2017); pr office/outpt visit,procedure only (Right, 9/20/2018); incision and drainage (Right, 2/18/2019); Leg amputation below knee (Right, 4/4/2019); and AMPUTATION ABOVE KNEE (Right, 4/18/2019). CURRENT MEDICATIONS       Discharge Medication List as of 1/4/2020  3:24 PM      CONTINUE these medications which have NOT CHANGED    Details   gabapentin (NEURONTIN) 300 MG capsule TAKE 1 CAPSULE BY MOUTH 3 TIMES A DAY, Disp-90 capsule, R-1Normal      doxycycline hyclate (VIBRAMYCIN) 100 MG capsule Take 100 mg by mouth 2 times dailyHistorical Med      linagliptin (TRADJENTA) 5 MG tablet Take 1 tablet by mouth daily, Disp-30 tablet, R-0Normal      pioglitazone (ACTOS) 15 MG tablet Take 1 tablet by mouth daily, Disp-30 tablet, R-0Normal      metFORMIN (GLUCOPHAGE) 500 MG tablet Take 1 tablet by mouth 2 times daily (with meals), Disp-60 tablet, R-5Normal      amLODIPine (NORVASC) 2.5 MG tablet Take 1 tablet by mouth daily, Disp-30 tablet, R-3Print      pantoprazole (PROTONIX) 40 MG tablet Take 1 tablet by mouth every morning (before breakfast), Disp-30 tablet, R-3Print      sertraline (ZOLOFT) 50 MG tablet Take 1 tablet by mouth daily, Disp-30 tablet, R-5Normal      insulin glargine (LANTUS) 100 UNIT/ML injection vial Inject 100 Units into the skin 2 times daily Please send up pen for patient he has needles at home, Disp-1 vial, R-0Print      AMINO ACIDS COMPLEX PO Take 1 each by mouth daily Daily AA drinkHistorical Med      Lancets MISC Disp-200 each, R-0, NormalUse to test blood sugars 4 times daily DX:E11.65      blood glucose monitor kit and supplies Accu Chek Sheila meter.  Use to test blood sugars DX:E11.65, Disp-1 kit, R-0, Normal      blood glucose monitor strips Accucheck Sheila Test Strips Test blood sugars 4 times daily DX:E11.65, Disp-400 strip, R-5, Normal      insulin aspart (NOVOLOG) 100 UNIT/ML injection vial Inject 30 Units into the skin 3 times daily (before meals) Indications: increased to 22 units on , but pt never increased dose., Disp-3 vial, R-3NO PRINT      albuterol sulfate  (90 Base) MCG/ACT inhaler Inhale 1 puff into the lungs every 6 hours as needed for Wheezing, Disp-1 Inhaler, R-3Print      Insulin Syringe-Needle U-100 29G X 1/2\" 0.3 ML MISC 4 TIMES DAILY AFTER MEALS AND BEFORE BEDTIME Starting 2019, Disp-200 each, R-0, Normal             ALLERGIES     is allergic to pcn [penicillins] and clindamycin/lincomycin. FAMILY HISTORY     He indicated that his mother is . He reported the following about his father: unknown. He indicated that the status of his maternal grandmother is unknown. He indicated that the status of his maternal grandfather is unknown.   family history includes Arthritis in his maternal grandfather; Depression in his mother; Diabetes in his mother; Early Death in his mother; Heart Disease in his maternal grandfather; High Blood Pressure in his mother; High Cholesterol in his mother; Other in his mother; Vision Loss in his maternal grandmother. SOCIAL HISTORY      reports that he quit smoking about 35 years ago. His smoking use included cigars. He has a 65.00 pack-year smoking history. He has never used smokeless tobacco. He reports previous alcohol use. He reports that he does not use drugs. PHYSICAL EXAM     INITIAL VITALS:  height is 5' 6\" (1.676 m) and weight is 221 lb (100.2 kg). His oral temperature is 98.2 °F (36.8 °C). His blood pressure is 144/73 (abnormal) and his pulse is 98. His respiration is 17 and oxygen saturation is 98%. Physical Exam  Vitals signs and nursing note reviewed. Constitutional:       Appearance: He is well-developed.  He is not toxic-appearing or Notable for the following components:    POC Glucose 196 (*)     All other components within normal limits       EMERGENCY DEPARTMENT COURSE:   Vitals:    Vitals:    01/04/20 1427   BP: (!) 144/73   Pulse: 98   Resp: 17   Temp: 98.2 °F (36.8 °C)   TempSrc: Oral   SpO2: 98%   Weight: 221 lb (100.2 kg)   Height: 5' 6\" (1.676 m)       2:36 PM: The patient was seen and evaluated. MDM:  Patient presenting with complaint of feeling shaky, states that he thought his was hypoglycemic. Patient's blood sugar within normal limits. Patient state that he feels better. Patient discharged home. CRITICAL CARE:   None     CONSULTS:      PROCEDURES:  None     FINAL IMPRESSION      1. Shakes          DISPOSITION/PLAN       PATIENT REFERRED TO:  No follow-up provider specified. DISCHARGE MEDICATIONS:  Discharge Medication List as of 1/4/2020  3:24 PM          (Please note that portions of this note were completed with a voice recognition program.  Efforts were made to edit the dictations but occasionally words are mis-transcribed.)    Scribe:  Vilma Burton   1/4/20 2:36 PM Scribing for and in the presence of Kiko Leo DO    Signed by: Denia Campbell, 01/04/20 4:26 PM    Provider:  I personally performed the services described in the documentation, reviewed and edited the documentation which was dictated to the scribe in my presence, and it accurately records my words and actions.     Kiko Leo DO 1/4/20 4:26 PM         Kiko Leo DO  01/04/20 2558

## 2020-01-06 ENCOUNTER — CARE COORDINATION (OUTPATIENT)
Dept: CARE COORDINATION | Age: 50
End: 2020-01-06

## 2020-01-06 ENCOUNTER — TELEPHONE (OUTPATIENT)
Dept: INTERNAL MEDICINE CLINIC | Age: 50
End: 2020-01-06

## 2020-01-06 RX ORDER — INSULIN GLARGINE 100 [IU]/ML
80 INJECTION, SOLUTION SUBCUTANEOUS 2 TIMES DAILY
Qty: 1 VIAL | Refills: 0 | Status: SHIPPED | OUTPATIENT
Start: 2020-01-06 | End: 2020-01-30

## 2020-01-06 NOTE — CARE COORDINATION
Ambulatory Care Coordination Note  1/7/2020  CM Risk Score: 6  [place] 10 Year Mortality Risk Score: 23%     ACC: Susan White, RN    Summary Note: Paula Tabor is being followed by care coordination for education and assistance in managing his DM. Spoke with Paula Tabor today for f/u. Pt was seen in ED on 1/4 for symptoms of hypoglycemia. BS on arrival was 106. Spoke with pt today. Pt reports that he work up that morning and took his BS which was 195. Took his Lantus and meal time insulin, but did not eat. Became symptomatic shortly after so he called the squad. Pt has been educated in past on s/s of hypoglycemia and that symptoms will most likely occur even if BS is not truly low b/c he has ran so high for such a long period of time. Pt was educated again in ED re: this. Reviewed with pt again on phone today as well as reviewed what to treat and how much when experiencing these symptoms. Pt reports that he typically has orange juice readily available. Pt aware to drink small amt and recheck BS 15 min if symptomatic. Treat again if BS still not up. Pt was d/c with hypoglycemia ed sheet. Advised pt to refer back to this as well. Discussed other options to treat low such as hard candies, regular pop, etc.  Stressed to pt that he cannot be skipping meals. Reinforced that mealtime insulin is to be taken with his meal.   Attempted to review BS's. With pt. Pt initially just giving me some random numbers. Asked pt if he could go and obtain exact BS readings as PCP needs to know exact readings. Pt placed me on hold to retrieve BS readings. 1/3: AM-180, Lunch-265 PM-235  1/4: AM-195-did not eat breakfast. BS on arrival to , pt reports BS at d/c was 193  1/5: Severa Payor, PM-305  1/6: AM-185  Advised pt to bring meter by office on 1/8 while he is here for wound clinic appt so meter can be downloaded for PCP to review. Pt states that he will do that.    Since ED d/c pt denies having anymore s/s of hypoglycemia. Pt reports that he needs new scripts for Lantus and Novolog as he is almost out and has no refills. Med request sent to PCP. Continues to f/u with wound clinic for wound to lle. Pt reports area around is still a little red, but improved from previously. LLE continues to have edema, but edema improved per pt. Encouraged close f/u with wound clinic and follow wound care instructions. Plan of Care:  Monitor BS readings. Keep a record of BS readings and call in to office every 2 wks for PCP to review. Check BS before all meals. Spoke with PCP. Requesting pt drop of meter on 1/8 when he is at wound clinic appt so meter can be downloaded. Do not skip meals. Be sure to eat when taking meal time insulin  Reviewed s/s of hypoglycemia and ways to treat. Recheck BS 15 min after tx. If symptomatic still or BS remains low re treat again and re check BS in 15 min. Refer back to ed sheets if needed. Discussed appropriate utilization. Keep quick carb with you at all times. Message sent to PCP re: need for insulin scripts. F/u with wound clinic on 1/8    Diabetes Assessment    Medic Alert ID:  No  Meal Planning:  Carb counting   How often do you test your blood sugar?:  Bedtime, Meals   Do you have barriers with adherence to non-pharmacologic self-management interventions? (Nutrition/Exercise/Self-Monitoring):  No   Have you ever had to go to the ED for symptoms of low blood sugar?:  No           and   General Assessment                 Care Coordination Interventions    Program Enrollment:  Complex Care  Referral from Primary Care Provider:  No  Suggested Interventions and [facility] (Comment: pt was advised to f/u with GUI.  have reinforced to pt several times. continues to not f/u.  12/13-discussed again today. reminded walk in clinic for diagnostic assess.  pt states that he will go. )  Diabetes Education:  Completed  Disease Specific

## 2020-01-06 NOTE — CARE COORDINATION
Attempted to reach Paula Tabor on home number and work number listed. Message left on work number requesting return call.

## 2020-01-06 NOTE — CARE COORDINATION
Attempted to reach patient for ED follow up and education. Patient was unavailable at the time of my call, and a generic voicemail message was left asking patient to return my call at [phone].

## 2020-01-07 ENCOUNTER — CARE COORDINATION (OUTPATIENT)
Dept: CARE COORDINATION | Age: 50
End: 2020-01-07

## 2020-01-07 NOTE — CARE COORDINATION
Looks like he needs to reduce his Humalog, take 28 with meals, and report if any lows with that change. WIll review meter download when he drops that by so I can make further changes.

## 2020-01-07 NOTE — CARE COORDINATION
Pt called today states that his BS dropped around 1AM to 89. He reports that he ate some oranges and was able to get BS up to 120. Reviewed diet last evening. Pt reports BS around 2030 was 200. He took 32 units of Novolog and 80 units of Lantus. Ate 2 brats with 2 buns for his evening meal.  Did not have snack after that. I encouraged pt to have a snack around 11pm-midnight consisting of protein and carb to help keep BS up. Pt reports that he stays up all night most nights. Reviewed ways to treat hypoglycemic episode. BS this  and at 1200-235  Advised pt again to bring meter for download to office tomorrow before of after wound clinic appt. Pt reports that he will. Reassured pt that symptoms will improve over time once his body adjusts to improved BS readings.

## 2020-01-07 NOTE — TELEPHONE ENCOUNTER
Steven Nails. Informed him to decrease Novolog dose to 28 units with meals. Advised to report any low BS readings and reminded again to bring meter to office for download tomorrow. Verbalizes understanding.

## 2020-01-08 ENCOUNTER — TELEPHONE (OUTPATIENT)
Dept: INTERNAL MEDICINE CLINIC | Age: 50
End: 2020-01-08

## 2020-01-08 ENCOUNTER — CARE COORDINATION (OUTPATIENT)
Dept: CARE COORDINATION | Age: 50
End: 2020-01-08

## 2020-01-08 ENCOUNTER — HOSPITAL ENCOUNTER (OUTPATIENT)
Dept: WOUND CARE | Age: 50
Discharge: HOME | End: 2020-01-08
Payer: MEDICARE

## 2020-01-08 VITALS
RESPIRATION RATE: 16 BRPM | SYSTOLIC BLOOD PRESSURE: 161 MMHG | OXYGEN SATURATION: 98 % | TEMPERATURE: 98.5 F | HEART RATE: 108 BPM | DIASTOLIC BLOOD PRESSURE: 70 MMHG

## 2020-01-08 PROCEDURE — 99212 OFFICE O/P EST SF 10 MIN: CPT

## 2020-01-08 PROCEDURE — 2709999900 HC NON-CHARGEABLE SUPPLY

## 2020-01-08 ASSESSMENT — PAIN SCALES - GENERAL: PAINLEVEL_OUTOF10: 10

## 2020-01-08 ASSESSMENT — PAIN DESCRIPTION - PAIN TYPE: TYPE: ACUTE PAIN

## 2020-01-08 ASSESSMENT — PAIN DESCRIPTION - ORIENTATION: ORIENTATION: LEFT

## 2020-01-08 ASSESSMENT — PAIN DESCRIPTION - LOCATION: LOCATION: LEG

## 2020-01-08 NOTE — CARE COORDINATION
Received text message from pt reporting that he is having bad diarrhea today. Attempted to contact pt. No answer. Message left to return call. Reviewed chart- appears pt is at wound clinic appt at present time. Will attempt to reach later today. Pt returned call. Reports that he just finished with wound clinic appt. Pt reports that appt went well. States that he does not have to f/u again at this time. Encouraged pt to closely monitor skin and to call if any new wounds should develop. Pt states that he is back home at present time. Reports that he forgot to bring meter with him, but will have roommate bring meter to office today for download. Discussed with pt if he picked up his scripts at pharmacy while he was at hospital.  Pt reports that he forgot to do that as well. Stressed the need for him to pick meds up as soon as possible. Encouraged him to have roommate pick them up when they bring meter to office today. Verbalizes understanding. Pt states that diarrhea has improved. Woke up with it this AM.  Pt states after being up for a few hours symptoms improved on their own. Feels it was something he ate last night that caused it. Encouraged to stay hydrated and continue to monitor. Advised if symptoms persist or do not continue to improve to call or notify PCP office. Reviewed BS's. Pt reports last evenings BS was 185. Did not eat snack at HS. BS this AM was 200. Pt now doing 28 units of Novolog with meals and 80 units of Lantus BID. Pt reported earlier this wk that he was to have [facility] appt today as well. Has not rescheduled yet at this time. States \"I'm about to right now. \"  Encouraged him to do so for his prosthetic. Denies further needs at this time.

## 2020-01-08 NOTE — CARE COORDINATION
Patient has been in contact with Maria Teresa Hodges. I will not contact any further at this time re: ED visit and education.     Future Appointments   Date Time Provider Ebenezer Carmichael   1/27/2020  2:00 PM MARILYN Steele - CNP [facility]   2/11/2020 11:30 AM Fernando Orr

## 2020-01-08 NOTE — PROGRESS NOTES
[facility]          Progress Note and Procedure Note      [facility] RECORD NUMBER:  [id number]  AGE: 44 y.o. GENDER: male  : 1970  EPISODE DATE:  2020    Subjective:     SUBJECTIVE     Chief Complaint   Patient presents with    Wound Check     left leg           HISTORY OF PRESENT ILLNESS   He was seen for follow-up visit. He had left leg cellulitis he is diabetic he has poorly controlled diabetes. He has right above-knee amputation. The wound on his left leg has scabbed over has developed a new blister on his shin there is no drainage. He denies any fever or chills blood glucose has recently been better. Neuropathy related to diabetes     PAST MEDICAL HISTORY         Diagnosis Date    Bipolar 2 disorder [facility])     previously followed with Dr. Salvatore Schulz and Artemio Morrissey in [facility] Diabetes mellitus type 2, uncontrolled ([address]     HgbA1c on 2019 was 9.1.     Diabetic polyneuropathy ([address]     Diabetic ulcer of right foot associated with type 2 diabetes mellitus ([address] 12/10/2015    Essential hypertension     \"never been on b/p medication that I know of\"    GERD (gastroesophageal reflux disease)     Hammer toe of left foot     Heart murmur     denies any chest pain or palpitations    History of tobacco abuse     Hx of AKA (above knee amputation), right ([address] 2019    Dr. Hosey Goldberg edema, diabetic, bilateral ([address] 2018    Dr. Laura Tapia referred to retina specialist for 2nd opinion    Marijuana abuse in remission     Onychomycosis     Other disorders of kidney and ureter in diseases classified elsewhere     WD-Skin ulcer of fourth toe of right foot with necrosis of bone ([address] 2016         PAST SURGICAL HISTORY     Past Surgical History:   Procedure Laterality Date    ABSCESS DRAINAGE Right     foot    AMPUTATION ABOVE KNEE Right 2019    RIGHT ABOVE KNEE AMPUTATION performed by Prachi Page MD at [address] [place] [address] UNIT/ML injection vial Inject 30 Units into the skin 3 times daily (before meals) Indications: increased to 22 units on 7/18, but pt never increased dose. 3 vial 3    insulin glargine (LANTUS) 100 UNIT/ML injection vial Inject 80 Units into the skin 2 times daily Please send up pen for patient he has needles at home 1 vial 0    gabapentin (NEURONTIN) 300 MG capsule TAKE 1 CAPSULE BY MOUTH 3 TIMES A DAY 90 capsule 1    linagliptin (TRADJENTA) 5 MG tablet Take 1 tablet by mouth daily 30 tablet 0    pioglitazone (ACTOS) 15 MG tablet Take 1 tablet by mouth daily 30 tablet 0    metFORMIN (GLUCOPHAGE) 500 MG tablet Take 1 tablet by mouth 2 times daily (with meals) 60 tablet 5    amLODIPine (NORVASC) 2.5 MG tablet Take 1 tablet by mouth daily 30 tablet 3    pantoprazole (PROTONIX) 40 MG tablet Take 1 tablet by mouth every morning (before breakfast) 30 tablet 3    sertraline (ZOLOFT) 50 MG tablet Take 1 tablet by mouth daily 30 tablet 5    AMINO ACIDS COMPLEX PO Take 1 each by mouth daily Daily AA drink      Lancets MISC Use to test blood sugars 4 times daily DX:E11.65 200 each 0    blood glucose monitor kit and supplies Accu Chek Sheila meter. Use to test blood sugars DX:E11.65 1 kit 0    blood glucose monitor strips Accucheck Sheila Test Strips Test blood sugars 4 times daily DX:E11.65 400 strip 5    albuterol sulfate  (90 Base) MCG/ACT inhaler Inhale 1 puff into the lungs every 6 hours as needed for Wheezing 1 Inhaler 3    Insulin Syringe-Needle U-100 29G X 1/2\" 0.3 ML MISC 1 each by Does not apply route 4 times daily (after meals and at bedtime) 200 each 0     No current facility-administered medications on file prior to encounter. REVIEW OF SYSTEMS:     Constitutional: no fever, no night sweats, no fatigue. Head: no head ache , no head injury, no migranes. Eye: no blurring of vision, no double vision.   Ears: no hearing difficulty, no tinnitus  Mouth/throat: no ulceration, dental caries , AM   Distance Tunneling (cm) 1.7 cm 3/27/2019 10:59 AM   Tunneling Position ___ O'Clock 12 3/27/2019 10:59 AM   Wound Assessment Other (Comment) 3/27/2019 10:59 AM   Drainage Amount Small 3/27/2019 10:59 AM   Drainage Description Serous 3/27/2019 10:59 AM   Odor None 3/27/2019 10:59 AM   Margins Attached edges; Unattached edges 3/27/2019 10:59 AM   Louise-wound Assessment Dry; Induration; Red 3/27/2019 10:59 AM   Red%Wound Bed 50 3/27/2019 10:59 AM   Yellow%Wound Bed 50 3/27/2019 10:59 AM   Number of days: 321       Wound 04/17/19 Leg Proximal;Right #1 RIght BKA SITE  (Active)   Wound Image   4/17/2019 10:57 AM   Dressing Changed Changed/New 4/17/2019 10:57 AM   Dressing/Treatment Dry Dressing 4/17/2019 10:57 AM   Wound Cleansed Rinsed/Irrigated with saline 4/17/2019 10:57 AM   Wound Length (cm) 9.5 cm 4/17/2019 10:57 AM   Wound Width (cm) 4 cm 4/17/2019 10:57 AM   Wound Depth (cm) 3 cm 4/17/2019 10:57 AM   Wound Surface Area (cm^2) 38 cm^2 4/17/2019 10:57 AM   Wound Volume (cm^3) 114 cm^3 4/17/2019 10:57 AM   Wound Assessment Yellow;Red 4/17/2019 10:46 AM   Drainage Amount Copious 4/17/2019 10:46 AM   Drainage Description Purulent;Yellow 4/17/2019 10:46 AM   Odor None 4/17/2019 10:46 AM   Margins Attached edges 4/17/2019 10:46 AM   Louise-wound Assessment Clean;Dry;Pink 4/17/2019 10:57 AM   Red%Wound Bed 70 4/17/2019 10:57 AM   Yellow%Wound Bed 30 4/17/2019 10:57 AM   Number of days: 266       Wound 09/03/19 Sacrum Mid non blanchable redness (Active)   Number of days: 126       Wound 12/11/19 Leg Left;Posterior (Active)   Wound Image   12/11/2019 10:24 AM   Wound Other 12/20/2019  8:25 AM   Dressing Status Changed 12/11/2019 10:24 AM   Dressing Changed Changed/New 12/11/2019 10:24 AM   Dressing/Treatment Open to air 12/22/2019  8:45 AM   Wound Cleansed Rinsed/Irrigated with saline 12/19/2019  9:07 AM   Wound Length (cm) 0 cm 1/8/2020 10:30 AM   Wound Width (cm) 0 cm 1/8/2020 10:30 AM   Wound Depth (cm) 0 cm 1/8/2020 10:30 AM   Wound Surface Area (cm^2) 0 cm^2 1/8/2020 10:30 AM   Change in Wound Size % (l*w) 100 1/8/2020 10:30 AM   Wound Volume (cm^3) 0 cm^3 1/8/2020 10:30 AM   Wound Healing % 100 1/8/2020 10:30 AM   Wound Assessment Dry; Intact;Painful;Red 12/22/2019  8:45 AM   Drainage Amount None 12/22/2019  8:45 AM   Drainage Description Serosanguinous 12/11/2019 10:24 AM   Odor None 12/20/2019 12:21 PM   Margins Attached edges 12/11/2019 10:24 AM   Louise-wound Assessment Dry;Red;Swelling 12/11/2019 10:24 AM   Red%Wound Bed 10 12/11/2019 10:24 AM   Yellow%Wound Bed 10 12/11/2019 10:24 AM   Other%Wound Bed 80 brown 12/11/2019 10:24 AM   Number of days: 27       Wound 01/08/20 Leg Left; Anterior #2 (Active)   Wound Length (cm) 2 cm 1/8/2020 10:30 AM   Wound Width (cm) 2 cm 1/8/2020 10:30 AM   Wound Depth (cm) 0.1 cm 1/8/2020 10:30 AM   Wound Surface Area (cm^2) 4 cm^2 1/8/2020 10:30 AM   Wound Volume (cm^3) 0.4 cm^3 1/8/2020 10:30 AM   Wound Assessment Red 1/8/2020 10:30 AM   Drainage Amount Small 1/8/2020 10:30 AM   Drainage Description Yellow 1/8/2020 10:30 AM   Odor None 1/8/2020 10:30 AM   Margins Attached edges 1/8/2020 10:30 AM   Louise-wound Assessment Dry 1/8/2020 10:30 AM   Red%Wound Bed 100 1/8/2020 10:30 AM   Number of days: 0       LABS      Lab Results   Component Value Date    BC No growth-preliminary No growth  12/18/2019       Assessment:     Left lower leg cellulities: improved. Open blister on the left shin: will apply foam dressing  Advised on good diabetes control.   Patient Active Problem List   Diagnosis Code    Cellulitis L03.90    Status post below knee amputation of right lower extremity ([facility] Z89.511    Gait disturbance R26.9    Sebaceous cyst L72.3    Uncontrolled type 2 diabetes mellitus with hyperglycemia, with long-term current use of insulin ([facility]) E11.65, Z79.4    Severe bipolar II disorder, recent episode major depressive, remission ([facility]) F31.81    Bipolar 1 disorder ([facility]) F31.9    Leukocytosis D72.829    Lactic acidosis E87.2    Hyponatremia E87.1    Normocytic anemia D64.9    Obesity (BMI 30-39. 9) E66.9    History of noncompliance with medical treatment Z91.19    Essential hypertension I10    Tobacco dependence F17.200    Gastroesophageal reflux disease without esophagitis K21.9    History of marijuana use Z87.898    Physical debility R53.81    Anemia D64.9    Electrolyte imbalance E87.8    Weakness R53.1    Infection of above knee amputation stump of right leg (HCC) T87.43    Above knee amputation of right lower extremity (HCC) W76.168O    Sepsis (HCC) A41.9    Vitamin D deficiency E55.9    COPD exacerbation ([facility]) J44.1    Influenza B J10.1    Depression, recurrent ([facility]) F33.9    Major depressive disorder, recurrent ([facility] F33.9          Plan:     Patient examined and evaluated    Treatment: No orders of the defined types were placed in this encounter. Please see attached Discharge Instructions    Written patient dismissal instructions given to patient and signed by patient or POA. Discharge Instructions         Discharge Instructions        Visit Discharge/Physician Orders:     Wound Location: Left leg      Dressing orders:      1) Gather wound care supplies and arrange on clean table.      2) Wash your hands with soap and water or use alcohol based hand  for 20 seconds (sing \"Happy Birthday\" twice).    3) Cleanse wounds with normal saline or wound cleanser and gauze. Pat dry with clean gauze.    4) Left leg- Apply a silicone bordered foam to the open areas. Leave in place for 4-5 days then change and apply new as needed.        Keep all dressings clean & dry.     Do not shower, take baths or get wound wet, unless otherwise instructed by your Wound Care doctor.      Follow up visit:   discharge call with issues or concerns     Keep next scheduled appointment. Please give 24 hour notice if unable to keep appointment.  [phone]     If you experience any of the following, please call the Wound Care Service during business hours: Monday through Friday 8:00 am - 4:30 pm  ([phone]).               *Increase in pain              *Temperature over 101              *Increase in drainage from your wound or a foul odor              *Uncontrolled swelling              *Need for compression bandage changes due to slippage, breakthrough drainage     If you need medical attention outside of business hours, please contact your Primary Care Doctor or go to the nearest emergency room.              Electronically signed by Jj Mohan MD on 1/8/2020 at 10:36 AM

## 2020-01-09 ENCOUNTER — CARE COORDINATION (OUTPATIENT)
Dept: CARE COORDINATION | Age: 50
End: 2020-01-09

## 2020-01-10 ENCOUNTER — CARE COORDINATION (OUTPATIENT)
Dept: CARE COORDINATION | Age: 50
End: 2020-01-10

## 2020-01-10 NOTE — CARE COORDINATION
[facility] order placed for prosthetic gait training s/p right AKA. Please send to [facility]  PT for scheduling.

## 2020-01-20 ENCOUNTER — HOSPITAL ENCOUNTER (OUTPATIENT)
Dept: PHYSICAL THERAPY | Age: 50
Setting detail: THERAPIES SERIES
Discharge: HOME | End: 2020-01-20
Payer: MEDICARE

## 2020-01-20 NOTE — PROGRESS NOTES
[facility]  PHYSICAL THERAPY MISSED TREATMENT NOTE  OUTPATIENT REHABILITATION    Date: 2020  Patient Name: Dawit Aparicio        MRN: [id number]   : 1970  (44 y.o.)  Gender: male   Referring Practitioner: Ruby JULIAN - CNP  Diagnosis: Z89.511 (ICD-10-CM) - Status post below knee amputation of right lower extremity ([facility] R26.9 (ICD-10-CM) - Gait disturbance    PT Visit Information  PT Insurance Information: MEDICARE - Unlimited visits based on medical necessity. Aquatics and modalities are covered except ionto and hot/cold packs. No Show: 1    REASON FOR MISSED TREATMENT:  No Show/No Call. This was evaluation appointment. Saroj Young  [id number], [address] [facility]

## 2020-01-21 ENCOUNTER — CARE COORDINATION (OUTPATIENT)
Dept: INTERNAL MEDICINE CLINIC | Age: 50
End: 2020-01-21

## 2020-01-21 ASSESSMENT — ENCOUNTER SYMPTOMS: DYSPNEA ASSOCIATED WITH: EXERTION

## 2020-01-21 NOTE — CARE COORDINATION
left left. Reports wound has healed. Still has some edema in lower ext's at time, but improves with elevation. COPD: reports that breathing is at baseline. Denies cough or congestion. Has not received blister packs for meds at this time. Advised pt to f/u with Didier at [place] [address]. I spoke with her. She is currently working on getting them ready. Does not have them in blister packs yet. Informed her that I am sending message to PCP re: metformin so she may want to double check on that prior to filling blister packs. Advised if having problems reaching pt to call as she will need to reach him for method of payment prior to mailing. Plan of care:   Message sent to PCP re: diarrhea with metformin persisting  Message sent for request for script for replacement socket. Pt to reschedule outpt therapy eval  F/u with GUI on 1/30 at 1pm.  Arrange transportation through [address] [place] [address]. Number provided  F/u with PCP on 1/27-bring meter to appt  Continue working with Dylan Farias at [facility] for prosthesis. Stressed importance of keeping all appts. Care Coordination Interventions    Program Enrollment:  Complex Care  Referral from Primary Care Provider:  No  Suggested Interventions and Community Resources  Behavorial Health: In Process (Comment: pt was advised to f/u with GUI.  have reinforced to pt several times. continues to not f/u.  12/13-discussed again today. reminded walk in clinic for diagnostic assess. pt states that he will go. )  Diabetes Education:  Completed  Disease Specific Clinic:  Completed (Comment: referred to DM clinic.  seeing CDE and dietician. 12/13- pt declines further f/u. DM clinic attempted to reach out previously and no response. now f/u with wound clinic for cellulitis of lle. has appt 1/8)  Medi Set or Pill Pack: In Process (Comment: 1/9 referral to oupt pharmacy for blister packs. )  Physical Therapy: In Process (Comment: PT was ordered. pt never went.   1/9 called today-went to f/u with Pam Murillo.  will work on new order for outpt therapy order. )  Registered Dietician:  Completed (Comment: following with dietician at DM clinic 12/13-discussed arranging dietician appt. pt declines. states \"I don't want to see her right now. I know what I need to do it's just doing it. \" )  Social Work:  Completed (Comment:  delivered glucometer to pt.  denies other needs from  at this time. )  Transportation Support:  Completed  Zone Management Tools:  Completed  Other Services or Interventions:  referred to Endo-Dr. Pathak was seen in hospital. pt dismissed from practice d/t no shows. PCP managing DM at this time         Goals Addressed    None         Prior to Admission medications    Medication Sig Start Date End Date Taking? Authorizing Provider   insulin detemir (LEVEMIR FLEXTOUCH) 100 UNIT/ML injection pen Inject 80 Units into the skin 2 times daily 1/9/20   Nereida Maxtejinder, APRN - CNP   insulin aspart (NOVOLOG) 100 UNIT/ML injection vial Inject 30 Units into the skin 3 times daily (before meals) Indications: increased to 22 units on 7/18, but pt never increased dose.  1/6/20   Nereida Maxcy, APRN - CNP   insulin glargine (LANTUS) 100 UNIT/ML injection vial Inject 80 Units into the skin 2 times daily Please send up pen for patient he has needles at home 1/6/20   Nereida Maxcy, APRN - CNP   gabapentin (NEURONTIN) 300 MG capsule TAKE 1 CAPSULE BY MOUTH 3 TIMES A DAY 12/30/19 6/27/20  Nereida Maxtejinder, APRN - CNP   linagliptin (TRADJENTA) 5 MG tablet Take 1 tablet by mouth daily 12/23/19   Ruth Marshall MD   pioglitazone (ACTOS) 15 MG tablet Take 1 tablet by mouth daily 12/23/19   Ruth Marshall MD   metFORMIN (GLUCOPHAGE) 500 MG tablet Take 1 tablet by mouth 2 times daily (with meals) 12/13/19   Nereida Tim, APRN - CNP   amLODIPine (NORVASC) 2.5 MG tablet Take 1 tablet by mouth daily 12/7/19   Jen Gomez PA-C   pantoprazole (PROTONIX) 40 MG tablet Take 1 tablet by mouth every morning (before breakfast) 12/7/19   Yeni Ocampo PA-C   sertraline (ZOLOFT) 50 MG tablet Take 1 tablet by mouth daily 12/2/19   MARILYN Rosales CNP   AMINO ACIDS COMPLEX PO Take 1 each by mouth daily Daily AA drink    Historical Provider, MD   Lancets MISC Use to test blood sugars 4 times daily DX:E11.65 10/8/19   MARILYN Rosales CNP   blood glucose monitor kit and supplies Accu Chek Sheila meter.  Use to test blood sugars DX:E11.65 10/8/19   MARILYN Rosales CNP   blood glucose monitor strips Accucheck Sheila Test Strips Test blood sugars 4 times daily DX:E11.65 10/8/19   MARILYN Rosales CNP   albuterol sulfate  (90 Base) MCG/ACT inhaler Inhale 1 puff into the lungs every 6 hours as needed for Wheezing 9/4/19   Amanda Williamson MD   Insulin Syringe-Needle U-100 29G X 1/2\" 0.3 ML MISC 1 each by Does not apply route 4 times daily (after meals and at bedtime) 4/30/19   Jerson Andrea MD       Future Appointments   Date Time Provider Ebenezer Carmichael   1/27/2020  2:00 PM MARILYN Rosales CNP [facility] - [id number]   2/11/2020 11:30 AM Fernando Cordova

## 2020-01-22 RX ORDER — METFORMIN HYDROCHLORIDE 500 MG/1
500 TABLET, EXTENDED RELEASE ORAL
Qty: 30 TABLET | Refills: 3 | Status: SHIPPED
Start: 2020-01-22 | End: 2020-02-19

## 2020-01-22 RX ORDER — PIOGLITAZONEHYDROCHLORIDE 30 MG/1
30 TABLET ORAL DAILY
Qty: 30 TABLET | Refills: 2 | Status: SHIPPED | OUTPATIENT
Start: 2020-01-22 | End: 2020-04-20 | Stop reason: DRUGHIGH

## 2020-01-22 NOTE — CARE COORDINATION
Yes I sent Metformin XR and reduce to 500 mg daily for a while to see if that improves his GI symptoms. We can increase the Actos to 30 mg daily as well. Sent to [facility] placed for [facility] as requested. Ang- This order needs faxed to [address].

## 2020-01-22 NOTE — TELEPHONE ENCOUNTER
Spoke with pt. Informed him Metformin changed to Metformin XR and dose will now be 500mg daily with breakfast.  Script sent to pharmacy. Also informed pt Actos is being increased to 30mg daily and new script was sent to pharmacy. Reminded pt that Brooke Pimentel from pharmacy will be contacting him tomorrow for a form of pmt so blister packs can be filled. Verbalizes understanding. Informed him script for replacement socket is being sent over to [facility]. Pt did not reschedule outpt therapy appt. Reminded pt again to do so. States that he will tomorrow. Reminded pt of appt on Monday with PCP 1/27 and stressed the need to bring his meter with him to this appt. Verbalizes understanding.

## 2020-01-23 ENCOUNTER — CARE COORDINATION (OUTPATIENT)
Dept: CARE COORDINATION | Age: 50
End: 2020-01-23

## 2020-01-28 ENCOUNTER — OFFICE VISIT (OUTPATIENT)
Dept: INTERNAL MEDICINE CLINIC | Age: 50
End: 2020-01-28
Payer: MEDICARE

## 2020-01-28 ENCOUNTER — CARE COORDINATION (OUTPATIENT)
Dept: CARE COORDINATION | Age: 50
End: 2020-01-28

## 2020-01-28 VITALS
HEIGHT: 66 IN | SYSTOLIC BLOOD PRESSURE: 124 MMHG | HEART RATE: 98 BPM | WEIGHT: 228 LBS | BODY MASS INDEX: 36.64 KG/M2 | DIASTOLIC BLOOD PRESSURE: 72 MMHG

## 2020-01-28 PROCEDURE — 99214 OFFICE O/P EST MOD 30 MIN: CPT | Performed by: NURSE PRACTITIONER

## 2020-01-28 PROCEDURE — 1036F TOBACCO NON-USER: CPT | Performed by: NURSE PRACTITIONER

## 2020-01-28 PROCEDURE — G8484 FLU IMMUNIZE NO ADMIN: HCPCS | Performed by: NURSE PRACTITIONER

## 2020-01-28 PROCEDURE — 3046F HEMOGLOBIN A1C LEVEL >9.0%: CPT | Performed by: NURSE PRACTITIONER

## 2020-01-28 PROCEDURE — G8427 DOCREV CUR MEDS BY ELIG CLIN: HCPCS | Performed by: NURSE PRACTITIONER

## 2020-01-28 PROCEDURE — 3017F COLORECTAL CA SCREEN DOC REV: CPT | Performed by: NURSE PRACTITIONER

## 2020-01-28 PROCEDURE — 2022F DILAT RTA XM EVC RTNOPTHY: CPT | Performed by: NURSE PRACTITIONER

## 2020-01-28 PROCEDURE — G8417 CALC BMI ABV UP PARAM F/U: HCPCS | Performed by: NURSE PRACTITIONER

## 2020-01-28 RX ORDER — DOXYCYCLINE HYCLATE 100 MG
100 TABLET ORAL 2 TIMES DAILY
Qty: 10 TABLET | Refills: 0 | Status: SHIPPED | OUTPATIENT
Start: 2020-01-28 | End: 2020-02-07

## 2020-01-28 NOTE — PROGRESS NOTES
with necrosis of bone ([facility] Past Surgical History  The patient  has a past surgical history that includes other surgical history (Right, 1/14/15); Abscess Drainage (Right); Toe amputation (Right, 1/16/15); Foot Debridement (Right, 07/01/2016); Leg amputation below knee (Right, 07/20/2016); [place] tooth extraction (?when); pr drain infect shoulder bursa (Left, 8/18/2017); pr office/outpt visit,procedure only (Right, 9/20/2018); incision and drainage (Right, 2/18/2019); Leg amputation below knee (Right, 4/4/2019); and AMPUTATION ABOVE KNEE (Right, 4/18/2019). Family History  This patient's family history includes Arthritis in his maternal grandfather; Depression in his mother; Diabetes in his mother; Early Death in his mother; Heart Disease in his maternal grandfather; High Blood Pressure in his mother; High Cholesterol in his mother; Other in his mother; Vision Loss in his maternal grandmother. Social History  Kelsea Ramírez  reports that he quit smoking about 35 years ago. His smoking use included cigars. He has a 65.00 pack-year smoking history. He has never used smokeless tobacco. He reports previous alcohol use. He reports that he does not use drugs.     Health Maintenance:    Health Maintenance   Topic Date Due    DTaP/Tdap/Td vaccine (1 - Tdap) 01/23/1979    Hepatitis B vaccine (1 of 3 - Risk 3-dose series) 01/23/1987    Shingles Vaccine (1 of 2) 01/23/2018    Colon cancer screen colonoscopy  01/23/2018    Diabetic retinal exam  05/09/2019    Annual Wellness Visit (AWV)  06/04/2019    Flu vaccine (1) 09/01/2019    Diabetic microalbuminuria test  10/10/2019    Lipid screen  10/10/2019    Diabetic foot exam  02/01/2020    A1C test (Diabetic or Prediabetic)  03/18/2020    Potassium monitoring  12/19/2020    Creatinine monitoring  12/19/2020    Pneumococcal 0-64 years Vaccine  Completed    HIV screen  Completed       Subjective:      Review of Systems   Constitutional: Negative for chills, fatigue and fever. HENT: Negative for sore throat and trouble swallowing. Eyes: Negative for itching and visual disturbance. Respiratory: Negative for cough, choking and shortness of breath. Cardiovascular: Negative for chest pain and leg swelling. Gastrointestinal: Negative for abdominal pain, constipation, diarrhea, nausea and vomiting. Endocrine: Negative for cold intolerance and heat intolerance. Genitourinary: Negative for difficulty urinating and dysuria. Musculoskeletal: Negative for arthralgias and myalgias. Skin: Positive for wound (Left shin, states draining green purulent material, painful). Negative for rash. Neurological: Negative for dizziness, tremors, weakness, light-headedness, numbness and headaches. Psychiatric/Behavioral: Negative for agitation, dysphoric mood, sleep disturbance and suicidal ideas. The patient is not nervous/anxious. Objective:     /72 (Site: Left Upper Arm, Position: Sitting, Cuff Size: Large Adult)   Pulse 98   Ht 5' 5.98\" (1.676 m)   Wt 228 lb (103.4 kg)   BMI 36.82 kg/m²     Physical Exam  Vitals signs reviewed. Constitutional:       General: He is not in acute distress. Appearance: He is well-developed. He is not diaphoretic. HENT:      Head: Normocephalic and atraumatic. Mouth/Throat:      Pharynx: No oropharyngeal exudate. Eyes:      General: No scleral icterus. Right eye: No discharge. Left eye: No discharge. Pupils: Pupils are equal, round, and reactive to light. Neck:      Musculoskeletal: Normal range of motion and neck supple. Thyroid: No thyromegaly. Cardiovascular:      Rate and Rhythm: Normal rate and regular rhythm. Heart sounds: Normal heart sounds. No murmur. No friction rub. No gallop. Pulmonary:      Effort: Pulmonary effort is normal. No respiratory distress. Breath sounds: Normal breath sounds. No wheezing or rales. Abdominal:      General: There is no distension. Palpations: Abdomen is soft. Tenderness: There is no abdominal tenderness. Musculoskeletal: Normal range of motion. General: Swelling (Left leg, 1+ pitting pre-tibial) present. No tenderness or deformity. Lymphadenopathy:      Cervical: No cervical adenopathy. Skin:     General: Skin is warm and dry. Coloration: Skin is not pale. Findings: No erythema or rash. Comments: Wound left shin, swollen and pink around scab, slightly warm, exquisitely tender to touch   Neurological:      Mental Status: He is alert and oriented to person, place, and time. Cranial Nerves: No cranial nerve deficit. Labs Reviewed 1/28/2020:    Lab Results   Component Value Date    WBC 10.6 12/19/2019    HGB 14.2 12/19/2019    HCT 42.3 12/19/2019     12/19/2019    CHOL 161 10/10/2018    TRIG 328 (H) 10/10/2018    HDL 30 10/10/2018    ALT 23 11/06/2019    AST 29 11/06/2019     (L) 12/19/2019    K 3.6 12/19/2019    CL 92 (L) 12/19/2019    CREATININE 0.7 12/19/2019    BUN 14 12/19/2019    CO2 23 12/19/2019    TSH 2.700 10/15/2019    INR 0.97 11/06/2019    GLUF 179 (H) 08/24/2016    LABA1C 11.4 (H) 12/18/2019    LABMICR 1.21 10/10/2018       Assessment/Plan      1. Uncontrolled type 2 diabetes mellitus with hyperglycemia, with long-term current use of insulin (HCC)  Reduce lantus to 75 units twice daily and Novolog 25 units plus scale with meals. Call if any further lows. Glucose results in one week for adjustment  - insulin detemir (LEVEMIR FLEXTOUCH) 100 UNIT/ML injection pen; Inject 75 Units into the skin 2 times daily  Dispense: 30 pen; Refill: 5  - insulin aspart (NOVOLOG) 100 UNIT/ML injection vial; Inject 25 Units into the skin 3 times daily (before meals) Indications: increased to 22 units on 7/18, but pt never increased dose.  (Patient taking differently: Inject 25 Units into the skin 3 times daily (before meals) )  Dispense: 3 vial; Refill: 3  - Basic Metabolic Panel; mouth 2 times daily for 10 days  Dispense: 10 tablet; Refill: 0  - Echocardiogram complete; Future      Return in about 6 days (around 2/3/2020). Patient given educational materials - see patient instructions. Discussed use, benefit, and side effects of prescribed medications. All patient questions answered. Pt voiced understanding. Reviewed health maintenance.        Electronically signed MARILYN Montana CNP on 1/28/20 at 1:51 PM

## 2020-01-29 ENCOUNTER — HOSPITAL ENCOUNTER (OUTPATIENT)
Dept: WOUND CARE | Age: 50
Discharge: HOME | End: 2020-01-29
Payer: MEDICARE

## 2020-01-29 VITALS
BODY MASS INDEX: 40.4 KG/M2 | TEMPERATURE: 97.9 F | RESPIRATION RATE: 18 BRPM | DIASTOLIC BLOOD PRESSURE: 73 MMHG | OXYGEN SATURATION: 98 % | HEIGHT: 63 IN | WEIGHT: 228 LBS | SYSTOLIC BLOOD PRESSURE: 156 MMHG | HEART RATE: 97 BPM

## 2020-01-29 PROCEDURE — 99213 OFFICE O/P EST LOW 20 MIN: CPT

## 2020-01-29 PROCEDURE — 2709999900 HC NON-CHARGEABLE SUPPLY

## 2020-01-29 ASSESSMENT — PAIN DESCRIPTION - LOCATION: LOCATION: LEG

## 2020-01-29 ASSESSMENT — PAIN SCALES - GENERAL: PAINLEVEL_OUTOF10: 9

## 2020-01-29 ASSESSMENT — PAIN DESCRIPTION - ORIENTATION: ORIENTATION: LEFT

## 2020-01-29 ASSESSMENT — PAIN DESCRIPTION - PAIN TYPE: TYPE: ACUTE PAIN

## 2020-01-29 NOTE — PROGRESS NOTES
[facility]          Progress Note and Procedure Note      [facility] RECORD NUMBER:  [id number]  AGE: 44 y.o. GENDER: male  : 1970  EPISODE DATE:  2020    Subjective:     SUBJECTIVE     Chief Complaint   Patient presents with    Wound Check     left leg           HISTORY OF PRESENT ILLNESS   He was seen for follow-up visit. He has a non-healing left leg wound. He is diabetic which is not well controlled he has right above-knee amputation. His left leg cellulitis has resolved at the wound is more granular and clean he has still swelling on his left leg he denies any fever or chills. PAST MEDICAL HISTORY         Diagnosis Date    Bipolar 2 disorder [facility])     previously followed with Dr. Gerardo Alex and Paz Gaxiola in [facility] Diabetes mellitus type 2, uncontrolled ([address]     HgbA1c on 2019 was 9.1.     Diabetic polyneuropathy ([address]     Diabetic ulcer of right foot associated with type 2 diabetes mellitus ([address] 12/10/2015    Essential hypertension     \"never been on b/p medication that I know of\"    GERD (gastroesophageal reflux disease)     Hammer toe of left foot     Heart murmur     denies any chest pain or palpitations    History of tobacco abuse     Hx of AKA (above knee amputation), right ([address] 2019    Dr. Severino Majestic edema, diabetic, bilateral ([address] 2018    Dr. Harjinder Renee referred to retina specialist for 2nd opinion    Marijuana abuse in remission     Onychomycosis     Other disorders of kidney and ureter in diseases classified elsewhere     WD-Skin ulcer of fourth toe of right foot with necrosis of bone ([address] 2016         PAST SURGICAL HISTORY     Past Surgical History:   Procedure Laterality Date    ABSCESS DRAINAGE Right     foot    AMPUTATION ABOVE KNEE Right 2019    RIGHT ABOVE KNEE AMPUTATION performed by Jamil Chaparro MD at [address] Right 2016    I & D    INCISION AND DRAINAGE Right 2019    I&D RIGHT STUMP performed by Shaye Dallas MD at [address] [facility] [facility] Right 2016    LEG AMPUTATION BELOW KNEE Right 2019    I&D AND REVISION OF AMPUTATION RIGHT LEG performed by Shaye Dallas MD at [address] [facility] Right 1/14/15    sole of foot I&D    AZ DRAIN INFECT SHOULDER BURSA Left 2017    LEFT SHOULDER INCISION AND DRAINAGE performed by Shaye Dallas MD at [address] OFFICE/OUTPT [address] Right 2018    EXCISIONAL DEBRIDEMENT RIGHT BKA STUMP performed by Candice Paulino MD at [address] Right 1/16/15    2nd toe with wound vac applied    WISDOM TOOTH EXTRACTION  ? when     FAMILY HISTORY         Family History   Problem Relation Age of Onset    Diabetes Mother     Other Mother         pneumonia, H1N1    Depression Mother     Early Death Mother     High Blood Pressure Mother     High Cholesterol Mother     Vision Loss Maternal Grandmother     Arthritis Maternal Grandfather     Heart Disease Maternal Grandfather      SOCIAL HISTORY       Social History     Tobacco Use    Smoking status: Former Smoker     Packs/day: 5.00     Years: 13.00     Pack years: 65.00     Types: Cigars     Last attempt to quit:      Years since quittin.0    Smokeless tobacco: Never Used   Substance Use Topics    Alcohol use: Not Currently     Alcohol/week: 0.0 standard drinks    Drug use: No     Comment: as a teenager only     ALLERGIES         Allergies   Allergen Reactions    Pcn [Penicillins] Shortness Of Breath, Nausea And Vomiting and Other (See Comments)     Does not remember reactions. Has TOLERATED amoxicillin and several different cephalosporins.      Clindamycin/Lincomycin Itching     MEDICATIONS         Current Outpatient Medications on File Prior to Encounter   Medication Sig Dispense Refill    insulin detemir (LEVEMIR FLEXTOUCH) 100 UNIT/ML injection pen Inject 75 Units into the skin 2 times daily 30 pen 5    insulin aspart (NOVOLOG) 100 UNIT/ML injection vial Inject 25 Units into the skin 3 times daily (before meals) Indications: increased to 22 units on 7/18, but pt never increased dose. 3 vial 3    pioglitazone (ACTOS) 30 MG tablet Take 1 tablet by mouth daily 30 tablet 2    metFORMIN (GLUCOPHAGE XR) 500 MG extended release tablet Take 1 tablet by mouth daily (with breakfast) 30 tablet 3    insulin glargine (LANTUS) 100 UNIT/ML injection vial Inject 80 Units into the skin 2 times daily Please send up pen for patient he has needles at home 1 vial 0    gabapentin (NEURONTIN) 300 MG capsule TAKE 1 CAPSULE BY MOUTH 3 TIMES A DAY 90 capsule 1    linagliptin (TRADJENTA) 5 MG tablet Take 1 tablet by mouth daily 30 tablet 0    amLODIPine (NORVASC) 2.5 MG tablet Take 1 tablet by mouth daily 30 tablet 3    pantoprazole (PROTONIX) 40 MG tablet Take 1 tablet by mouth every morning (before breakfast) 30 tablet 3    sertraline (ZOLOFT) 50 MG tablet Take 1 tablet by mouth daily 30 tablet 5    AMINO ACIDS COMPLEX PO Take 1 each by mouth daily Daily AA drink      doxycycline hyclate (VIBRA-TABS) 100 MG tablet Take 1 tablet by mouth 2 times daily for 10 days 10 tablet 0    Lancets MISC Use to test blood sugars 4 times daily DX:E11.65 200 each 0    blood glucose monitor kit and supplies Accu Chek Sheila meter. Use to test blood sugars DX:E11.65 1 kit 0    blood glucose monitor strips Accucheck Sheila Test Strips Test blood sugars 4 times daily DX:E11.65 400 strip 5    Insulin Syringe-Needle U-100 29G X 1/2\" 0.3 ML MISC 1 each by Does not apply route 4 times daily (after meals and at bedtime) 200 each 0     No current facility-administered medications on file prior to encounter. REVIEW OF SYSTEMS:     Constitutional: no fever, no night sweats, no fatigue. Head: no head ache , no head injury, no migranes. Eye: no blurring of vision, no double vision.   Ears: no hearing difficulty, no Wound Size % (l*w) 6.25 3/27/2019 10:59 AM   Wound Volume (cm^3) 1.35 cm^3 3/27/2019 10:59 AM   Distance Tunneling (cm) 1.7 cm 3/27/2019 10:59 AM   Tunneling Position ___ O'Clock 12 3/27/2019 10:59 AM   Wound Assessment Other (Comment) 3/27/2019 10:59 AM   Drainage Amount Small 3/27/2019 10:59 AM   Drainage Description Serous 3/27/2019 10:59 AM   Odor None 3/27/2019 10:59 AM   Margins Attached edges; Unattached edges 3/27/2019 10:59 AM   Louise-wound Assessment Dry; Induration; Red 3/27/2019 10:59 AM   Red%Wound Bed 50 3/27/2019 10:59 AM   Yellow%Wound Bed 50 3/27/2019 10:59 AM   Number of days: 321       Wound 04/17/19 Leg Proximal;Right #1 RIght BKA SITE  (Active)   Wound Image   4/17/2019 10:57 AM   Dressing Changed Changed/New 4/17/2019 10:57 AM   Dressing/Treatment Dry Dressing 4/17/2019 10:57 AM   Wound Cleansed Rinsed/Irrigated with saline 4/17/2019 10:57 AM   Wound Length (cm) 9.5 cm 4/17/2019 10:57 AM   Wound Width (cm) 4 cm 4/17/2019 10:57 AM   Wound Depth (cm) 3 cm 4/17/2019 10:57 AM   Wound Surface Area (cm^2) 38 cm^2 4/17/2019 10:57 AM   Wound Volume (cm^3) 114 cm^3 4/17/2019 10:57 AM   Wound Assessment Yellow;Red 4/17/2019 10:46 AM   Drainage Amount Copious 4/17/2019 10:46 AM   Drainage Description Purulent;Yellow 4/17/2019 10:46 AM   Odor None 4/17/2019 10:46 AM   Margins Attached edges 4/17/2019 10:46 AM   Louise-wound Assessment Clean;Dry;Pink 4/17/2019 10:57 AM   Red%Wound Bed 70 4/17/2019 10:57 AM   Yellow%Wound Bed 30 4/17/2019 10:57 AM   Number of days: 266       Wound 09/03/19 Sacrum Mid non blanchable redness (Active)   Number of days: 126       Wound 12/11/19 Leg Left;Posterior (Active)   Wound Image   12/11/2019 10:24 AM   Wound Other 12/20/2019  8:25 AM   Dressing Status Changed 12/11/2019 10:24 AM   Dressing Changed Changed/New 12/11/2019 10:24 AM   Dressing/Treatment Open to air 12/22/2019  8:45 AM   Wound Cleansed Rinsed/Irrigated with saline 12/19/2019  9:07 AM   Wound Length (cm) 0 cm 1/8/2020 10:30 AM   Wound Width (cm) 0 cm 1/8/2020 10:30 AM   Wound Depth (cm) 0 cm 1/8/2020 10:30 AM   Wound Surface Area (cm^2) 0 cm^2 1/8/2020 10:30 AM   Change in Wound Size % (l*w) 100 1/8/2020 10:30 AM   Wound Volume (cm^3) 0 cm^3 1/8/2020 10:30 AM   Wound Healing % 100 1/8/2020 10:30 AM   Wound Assessment Dry; Intact;Painful;Red 12/22/2019  8:45 AM   Drainage Amount None 12/22/2019  8:45 AM   Drainage Description Serosanguinous 12/11/2019 10:24 AM   Odor None 12/20/2019 12:21 PM   Margins Attached edges 12/11/2019 10:24 AM   Louise-wound Assessment Dry;Red;Swelling 12/11/2019 10:24 AM   Red%Wound Bed 10 12/11/2019 10:24 AM   Yellow%Wound Bed 10 12/11/2019 10:24 AM   Other%Wound Bed 80 brown 12/11/2019 10:24 AM   Number of days: 27       Wound 01/08/20 Leg Left; Anterior #2 (Active)   Wound Length (cm) 2 cm 1/8/2020 10:30 AM   Wound Width (cm) 2 cm 1/8/2020 10:30 AM   Wound Depth (cm) 0.1 cm 1/8/2020 10:30 AM   Wound Surface Area (cm^2) 4 cm^2 1/8/2020 10:30 AM   Wound Volume (cm^3) 0.4 cm^3 1/8/2020 10:30 AM   Wound Assessment Red 1/8/2020 10:30 AM   Drainage Amount Small 1/8/2020 10:30 AM   Drainage Description Yellow 1/8/2020 10:30 AM   Odor None 1/8/2020 10:30 AM   Margins Attached edges 1/8/2020 10:30 AM   Louise-wound Assessment Dry 1/8/2020 10:30 AM   Red%Wound Bed 100 1/8/2020 10:30 AM   Number of days: 0       LABS      Lab Results   Component Value Date    BC No growth-preliminary No growth  12/18/2019       Assessment:     Left lower leg cellulities: improved. he has open granular wound.   Continue compression wrap  Foam dressing to left leg  Follow up will be scheduled  Patient Active Problem List   Diagnosis Code    Cellulitis L03.90    Status post below knee amputation of right lower extremity ([facility] Z89.511    Gait disturbance R26.9    Sebaceous cyst L72.3    Uncontrolled type 2 diabetes mellitus with hyperglycemia, with long-term current use of insulin ([facility]) E11.65, Z79.4    Severe bipolar II disorder, recent episode major depressive, remission ([facility] F31.81    Bipolar 1 disorder ([facility] F31.9    Leukocytosis D72.829    Lactic acidosis E87.2    Hyponatremia E87.1    Normocytic anemia D64.9    Obesity (BMI 30-39. 9) E66.9    History of noncompliance with medical treatment Z91.19    Essential hypertension I10    Tobacco dependence F17.200    Gastroesophageal reflux disease without esophagitis K21.9    History of marijuana use Z87.898    Physical debility R53.81    Anemia D64.9    Electrolyte imbalance E87.8    Weakness R53.1    Infection of above knee amputation stump of right leg (HCC) T87.43    Above knee amputation of right lower extremity ([facility]) K23.269T    Sepsis ([facility]) A41.9    Vitamin D deficiency E55.9    COPD exacerbation ([facility]) J44.1    Influenza B J10.1    Depression, recurrent ([facility]) F33.9    Major depressive disorder, recurrent ([facility] F33.9          Plan:     Patient examined and evaluated    Treatment: No orders of the defined types were placed in this encounter. Please see attached Discharge Instructions    Written patient dismissal instructions given to patient and signed by patient or POA. Discharge Instructions       Visit Discharge/Physician Orders:     Wound Location: Left leg      Dressing orders:      1) Gather wound care supplies and arrange on clean table.      2) Wash your hands with soap and water or use alcohol based hand  for 20 seconds (sing \"Happy Birthday\" twice).    3) Cleanse wounds with normal saline or wound cleanser and gauze. Pat dry with clean gauze.    4) Left leg- Apply a silicone bordered foam to the open areas. Leave in place for 4-5 days then change and apply new as needed.     Keep all dressings clean & dry.     Do not shower, take baths or get wound wet, unless otherwise instructed by your Wound Care doctor.      Follow up visit:   discharge call with issues or concerns     Keep next scheduled appointment.  Please give 24 hour

## 2020-01-29 NOTE — PLAN OF CARE
Problem: Wound:  Goal: Will show signs of wound healing; wound closure and no evidence of infection  Description  Will show signs of wound healing; wound closure and no evidence of infection  Outcome: Ongoing   Patient presents to wound clinic for follow up of left leg wound. No s/s of infection. Patient afebrile. Dressing supplies and compression stocking ordered through [facility]. Patient may switch from ace bandage/coban to compression stocking once received- apply daily in the morning and remove at night. Patient to continue to elevate left leg and keep blood sugars well controlled to help with wound healing. Left lower leg- Apply a silicone bordered foam to the open areas. Wrap with ace bandage then coban over top. Change every 3 days. Follow up visit:  4 weeks on Wednesday February 26th at 11:15 am.    Care plan reviewed with patient. Patient verbalize understanding of the plan of care and contribute to goal setting.

## 2020-01-30 ASSESSMENT — ENCOUNTER SYMPTOMS
CONSTIPATION: 0
DIARRHEA: 0
CHOKING: 0
SHORTNESS OF BREATH: 0
SORE THROAT: 0
TROUBLE SWALLOWING: 0
VOMITING: 0
ABDOMINAL PAIN: 0
EYE ITCHING: 0
NAUSEA: 0
COUGH: 0

## 2020-02-03 ENCOUNTER — CARE COORDINATION (OUTPATIENT)
Dept: CARE COORDINATION | Age: 50
End: 2020-02-03

## 2020-02-04 ENCOUNTER — HOSPITAL ENCOUNTER (OUTPATIENT)
Dept: PHYSICAL THERAPY | Age: 50
Setting detail: THERAPIES SERIES
Discharge: HOME | End: 2020-02-04
Payer: MEDICARE

## 2020-02-04 PROCEDURE — 97110 THERAPEUTIC EXERCISES: CPT

## 2020-02-04 PROCEDURE — 97161 PT EVAL LOW COMPLEX 20 MIN: CPT

## 2020-02-04 NOTE — PROGRESS NOTES
7/20/16 and then had infection and did not heal and then in April of 2019 had AK amputation. Patient still has occasional phantom pain at Left  LE. Patient states he received  AK prosthesis 2 days ago. Patient did not bring prosthesis with him today. I forgot to bring it. States when he has his BK prosthesis he did very well with walking and driving and activities until the infection in which he had AK amputation. Denies of any open areas at left residual AK. He has been working on upper body strengthening at home with resistance bands. Patient states independent at home with his transfers and w/c mobility. He does have friends who help transport him to appointment. Patient interested in lining up transportation to and from therapy. 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 note scab over the area. No drainage. Note left AK residual limb with mobile healed scar. Note no scar tissue adheriing incision. ROM RLE: Residual AKA: hip extension 15 degrees past neutral WNLS, hip flexion WNLS 100 degrees, abduction/adduction WNLS. No contractures noted. Hip extension WNLs.     ROM LLE: LLE SLR to 70 degrees, SKTC WNLS, knee ROM WNLS,     Comment: Strength RLE: hip extension 5/5, hip abduction 5/5, adduction 4+/5, Strength LLE hip flexors, abductors and extensors 5/5, knee flexors and extensors 5/5, ankle dorsiflexion 3-/5 slight foot drop noted      Tone RLE  RLE Tone: Normotonic  Tone LLE  LLE Tone: Normotonic       Special Tests: Patient has difficulty completing a left single heelraise with decreased heel clearance. Patient able to complete partial single LLE squat in // bars with UE support. Overall Sensation Status: Impaired(left foot secondary to neuropathy )           Rolling to Left: Independent  Rolling to Right: Independent  Supine to Sit: Independent  Sit to Supine: Independent                Transfers  Sit to Stand: Modified independent  Stand to sit: Modified independent  Bed to Chair: Modified independent  Stand Pivot Transfers: Modified independent  Lateral Transfers: Modified independent  Comment: Patient independent with transfers with modified stand pivot and lateral transfers without prosthesis on right residual limb. Ambulation 1  Device: (parallel bars)  Assistance: Stand by assistance  Quality of Gait: ambulated in // bars with bilateral UE support on bars with wb LLE and without prosthesis 20 feet. Note moderate fatigue level with ambulation in // bars with mild shortness of breath. Distance: 20 feet                   Balance  Sitting - Static: Good  Sitting - Dynamic: Good  Comments: Patient right AK amputation who just received prosthesis on Monday through [facility]. Did not bring prosthesis to PT today. Exercises  Exercise 1: evaluation completed. Exercise 2: ambulation between // bars with SBA of one person for 20 feet. Full wb through LLE without right AK prosthesis. Moderate fatigue noted. Exercise 3: transfers and bed mobility modified independent. Exercise 4: // bars: left single squat x5, left single heelraise x5 with decreased heelclearance and ankle instability noted. with tendency to roll out and supinate forefoot.            Activity Tolerance:  Activity Tolerance: Patient Tolerated treatment well  Activity Tolerance: Patient very motivated and strength at UE WN

## 2020-02-07 ENCOUNTER — CARE COORDINATION (OUTPATIENT)
Dept: CARE COORDINATION | Age: 50
End: 2020-02-07

## 2020-02-07 NOTE — CARE COORDINATION
Contacted Jammie Hill with outpt pharmacy today as blister packs were being set up for pt. Informed that pt has started blister packs. Pt has option for them to be mailed to him if he chooses. They will contact pt each month when his packs are due. Attempted to reach pt today. No answer. Message left to return call. Pt did attend PT eval on 2/4 for training for his prosthesis. Has been working with Zandra Peña at [facility] for fitting of new socket. Pt was to have appt with PCP but no showed on 2/3 and 2/6. Have arranged multiple appts for pt in the past.  Pt has transport to appts through [address] [place] [address] Road and friends that live with him at [facility] also assist with transportation. Pt did f/u with wound clinic for evaluation of small wound lle. Has f/u on 2/26. Now receiving blister packs to assist in better mgmt of medications. Had appt that was arranged through Lauren Lipoma at [facility] for diagnostic assess as pt never followed up through walk in clinic. appt was arranged for 1/30. Unfortunately pt did not show for that appt. Pt has been provided with [employer] contact info as well as CM for transportation through [employer]. Pt has been in care coordination for prolonged period of tie. Last ED visit on 1/4 for \"shakes. \" last inpt admission was 12/18 for cellulitis. Pt has been educated on availability of same day appts. pt has been in care coordination for extended period of time. Connected with multiple resources.   Will graduate from care coordination upon PCP approval.

## 2020-02-10 ENCOUNTER — HOSPITAL ENCOUNTER (OUTPATIENT)
Dept: PHYSICAL THERAPY | Age: 50
Setting detail: THERAPIES SERIES
Discharge: HOME | End: 2020-02-10
Payer: MEDICARE

## 2020-02-10 PROCEDURE — 97530 THERAPEUTIC ACTIVITIES: CPT

## 2020-02-10 PROCEDURE — 97110 THERAPEUTIC EXERCISES: CPT

## 2020-02-10 NOTE — PROGRESS NOTES
[place]     Time In: 1181  Time Out: 9092  Minutes: 41  Timed Code Treatment Minutes: 41 Minutes     Date: 2/10/2020  Patient Name: Byron Cotton,  Gender:  male        CSN: [id number]   : 1970  (44 y.o.)       Referring Practitioner: MARILYN Dubois CNP      Diagnosis: Z89.511 (ICD-10-CM) - Status post below knee amputation of right lower extremity ([facility] R26.9 (ICD-10-CM) - Gait disturbance  Treatment Diagnosis: right AK amputation with gait training with AK prosthesis and functional mobility. Additional Pertinent Hx: comorbidities: right above knee amputation 19, , DM, bipolar        General:  PT Visit Information  Onset Date: 20  PT Insurance Information: MEDICARE - Unlimited visits based on medical necessity. Aquatics and modalities are covered except ionto and hot/cold packs. Total # of Visits to Date: 2  Plan of Care/Certification Expiration Date: 20  Progress Note Counter: 2/10 for PN              Subjective:  Family / Caregiver Present: Yes  Comments: Follow up with Isaura Tatum CNP on 20   Other (Comment): prosthetic gait training -     Subjective: Patient came with prosthesis today. States he was supposed to go to [place] today but missed that appointment because he had therapy. Patient states he hasn't had his prosthesis on since he got it. Pain:  Patient Currently in Pain: No     Objective    Exercises  Exercise 1: Spent time working on donning and doffing prosthesis. Patient was unclear on correct process. When prosthesis was donned, Right foot was internally rotated. Instructed patient to contact  to reschedule his appointment. Exercise 3: Transfers and bed mobility modified independent.    Exercise 4: // bars: left single squat x5, left single heelraise x5 with decreased heel clearance and ankle instability noted with tendency to roll out and supinate forefoot. Exercise 5: OT UBE for endurance x5 minutes work load 120 with patient sitting in wheelchair. Exercise 6: Ambulation with prosthesis and rolling walker 3 feet with min assist x2 but prosthesis was not donned correctly so stopped for safety. Activity Tolerance:  Activity Tolerance: Patient Tolerated treatment well  Activity Tolerance: Patient very motivated and strength at UE WNLS as well as at right LE residual limb and left LE. Assessment: Body structures, Functions, Activity limitations: Decreased functional mobility , Decreased sensation, Decreased strength, Decreased balance  Assessment: Spent majority of session working with patient to don and doff prosthesis correctly. Unable to get a good fit and instructed patient to reschedule appointment with Adalberto. Patient is motivated. Prognosis: Good  Discharge Recommendations: Continue to assess pending progress    Patient Education:  PT Education: Goals, PT Role, Plan of Care, Gait Training, Transfer Training, Home Exercise Program  Patient Education: Reschedule appointment with Pantera Young at [place] for correct donning/doffing of prosthesis. Continue strengthening and ROM exercises. Plan:  Times per week: 2x  Plan weeks: 12 weeks  Specific instructions for Next Treatment: instruction donning /doffing prosthesis, static stance in // bars for balance, gait training initiate in // bars and progress to roller walker when able, Progress as able. UE UBE for endurance, USE OT BIODEX. UE strengthening program as well update. Current Treatment Recommendations: Strengthening, Balance Training, Home Exercise Program, Functional Mobility Training, Transfer Training, Gait Training, Endurance Training, Patient/Caregiver Education & Training  Plan Comment: Continue with current POC    Goals:  Patient goals : To walk with prosthesis and be more mobile with resuming activities during day as before.      Short term goals  Time Frame for Short term

## 2020-02-11 ENCOUNTER — INITIAL CONSULT (OUTPATIENT)
Dept: PULMONOLOGY | Age: 50
End: 2020-02-11
Payer: MEDICARE

## 2020-02-11 VITALS
OXYGEN SATURATION: 99 % | HEIGHT: 63 IN | SYSTOLIC BLOOD PRESSURE: 128 MMHG | WEIGHT: 228 LBS | DIASTOLIC BLOOD PRESSURE: 78 MMHG | BODY MASS INDEX: 40.4 KG/M2 | HEART RATE: 91 BPM

## 2020-02-11 PROCEDURE — 99203 OFFICE O/P NEW LOW 30 MIN: CPT | Performed by: INTERNAL MEDICINE

## 2020-02-11 PROCEDURE — 1036F TOBACCO NON-USER: CPT | Performed by: INTERNAL MEDICINE

## 2020-02-11 PROCEDURE — G8484 FLU IMMUNIZE NO ADMIN: HCPCS | Performed by: INTERNAL MEDICINE

## 2020-02-11 PROCEDURE — G8427 DOCREV CUR MEDS BY ELIG CLIN: HCPCS | Performed by: INTERNAL MEDICINE

## 2020-02-11 PROCEDURE — G8417 CALC BMI ABV UP PARAM F/U: HCPCS | Performed by: INTERNAL MEDICINE

## 2020-02-11 PROCEDURE — 3017F COLORECTAL CA SCREEN DOC REV: CPT | Performed by: INTERNAL MEDICINE

## 2020-02-11 NOTE — LETTER
[address] [facility]  [address] [facility],[address]  Phone: [phone]  Fax: [phone]    Manish Michael MD        February 11, 2020     Ruby July, APRN - [facility]  [address] [address]    Patient: Dawit Aparicio  MR Number: [id number]  YOB: 1970  Date of Visit: 2/11/2020    Dear Dr. Beltran July: Thank you for the request for consultation for Marsha Avilez to me for the evaluation of MURALI. Below are the relevant portions of my assessment and plan of care. New Sleep Patient H/P    Presentation:  Diamond Broderick is referred by Reji Yepez for  excessive daytime somnolence    Diamond Broderick c/o EDS, ex wife reports period of apnea during sleep, wakes with gasping respirations, morbidly obese BMI 40 (AKA status), difficulty maintaining sleep frequent arousals. Works 3 shift leaves work at 8 am but does not go to sleep until 4 pm.    Symptoms began:  a few years ago. Symptoms include: snoring, choking, gasping, periods of not breathing, excessive daytime sleepiness, falling asleep while reading, watching television, disrupted sleep, naps    HTN on Amlodipine, DM. Previous evaluation and treatment? No        Time in Bed:   Bedtime: 4 p.m. Awakens  9 p.m. Different on weekends? No       Diamond Broderick falls asleep in 10  minutes. Any awakenings? Yes  Difficulty Falling back to sleep? Yes    Naps:  Any naps? Yes and are they helpful Yes    Snoring and Apneas:  Do you snore or been told you a snore? Yes  How long have known about your snoring? years  Any witnessed apneas? Yes  Any awakenings with choking or gasping? Yes    Dreams:  Any recurring dreams? No  Hallucinations? No  Sleep Paralysis? No  Symptoms of Cataplexy? No    Driving History:    Does not dive    Weight:  Any change in weight over the past year? No   How about past 5 years?  Yes  How much? 50 lbs    Other Compliants :Diamond Broderick complains of decreased memory, decreased

## 2020-02-11 NOTE — COMMUNICATION BODY
New Sleep Patient H/P    Presentation:  Gabi Simpson is referred by Santo Bashir for  excessive daytime somnolence    Gabi Simpson c/o EDS, ex wife reports period of apnea during sleep, wakes with gasping respirations, morbidly obese BMI 40 (AKA status), difficulty maintaining sleep frequent arousals. Works 3 shift leaves work at 8 am but does not go to sleep until 4 pm.    Symptoms began:  a few years ago. Symptoms include: snoring, choking, gasping, periods of not breathing, excessive daytime sleepiness, falling asleep while reading, watching television, disrupted sleep, naps    HTN on Amlodipine, DM. Previous evaluation and treatment? No        Time in Bed:   Bedtime: 4 p.m. Awakens  9 p.m. Different on weekends? No       Gabi Simpson falls asleep in 10  minutes. Any awakenings? Yes  Difficulty Falling back to sleep? Yes    Naps:  Any naps? Yes and are they helpful Yes    Snoring and Apneas:  Do you snore or been told you a snore? Yes  How long have known about your snoring? years  Any witnessed apneas? Yes  Any awakenings with choking or gasping? Yes    Dreams:  Any recurring dreams? No  Hallucinations? No  Sleep Paralysis? No  Symptoms of Cataplexy? No    Driving History:    Does not dive    Weight:  Any change in weight over the past year? No   How about past 5 years? Yes  How much? 50 lbs    Other Compliants :Gabi Simpson complains of decreased memory, decreased concentration as well. Past Medical History:   Diagnosis Date    Bipolar 2 disorder [facility])     previously followed with Dr. Tiffanie Nation and Javier Mosqueda [facility] Diabetes mellitus type 2, uncontrolled ([facility]     HgbA1c on 4/2/2019 was 9.1.     Diabetic polyneuropathy ([facility]     Diabetic ulcer of right foot associated with type 2 diabetes mellitus ([facility] 12/10/2015    Essential hypertension     \"never been on b/p medication that I know of\"    GERD (gastroesophageal reflux disease)     Hammer toe of left foot     Heart murmur     denies any chest pain or palpitations    History of tobacco abuse     Hx of AKA (above knee amputation), right ([facility] 2019    Dr. Fletcher Landau edema, diabetic, bilateral ([facility] 2018    Dr. Barrett Collins referred to retina specialist for 2nd opinion    Marijuana abuse in remission     Onychomycosis     Other disorders of kidney and ureter in diseases classified elsewhere     WD-Skin ulcer of fourth toe of right foot with necrosis of bone ([facility] 2016       Past Surgical History:   Procedure Laterality Date    ABSCESS DRAINAGE Right     foot    AMPUTATION ABOVE KNEE Right 2019    RIGHT ABOVE KNEE AMPUTATION performed by Magda Rich MD at [address] Right 2016    I & D    INCISION AND DRAINAGE Right 2019    I&D RIGHT STUMP performed by Magda Rich MD at [address] [facility],[address] Right 2016    LEG AMPUTATION BELOW KNEE Right 2019    I&D AND REVISION OF AMPUTATION RIGHT LEG performed by Magda Rich MD at [address] [facility] [address] Right 1/14/15    sole of foot I&D    [place] DRAIN INFECT SHOULDER BURSA Left 2017    LEFT SHOULDER INCISION AND DRAINAGE performed by Magda Rich MD at [address] [facility] OFFICE/OUTPT [address] Right 2018    EXCISIONAL DEBRIDEMENT RIGHT BKA STUMP performed by Leeroy Ballard MD at [address] [place] [address] Drive Right 1/16/15    2nd toe with wound vac applied    WISDOM TOOTH EXTRACTION  ? when       Social History     Tobacco Use    Smoking status: Former Smoker     Packs/day: 5.00     Years: 13.00     Pack years: 65.00     Types: Cigars     Last attempt to quit: 1985     Years since quittin.1    Smokeless tobacco: Never Used   Substance Use Topics    Alcohol use: Not Currently     Alcohol/week: 0.0 standard drinks    Drug use: No     Comment: as a teenager only       Allergies   Allergen Reactions    Pcn [Penicillins] Shortness Of Breath, Nausea And Vomiting and Other (See Comments) Does not remember reactions. Has TOLERATED amoxicillin and several different cephalosporins.  Clindamycin/Lincomycin Itching       Current Outpatient Medications   Medication Sig Dispense Refill    insulin detemir (LEVEMIR FLEXTOUCH) 100 UNIT/ML injection pen Inject 75 Units into the skin 2 times daily 30 pen 5    insulin aspart (NOVOLOG FLEXPEN) 100 UNIT/ML injection pen Inject 25 Units into the skin 3 times daily (before meals) 5 pen 3    pioglitazone (ACTOS) 30 MG tablet Take 1 tablet by mouth daily 30 tablet 2    metFORMIN (GLUCOPHAGE XR) 500 MG extended release tablet Take 1 tablet by mouth daily (with breakfast) 30 tablet 3    gabapentin (NEURONTIN) 300 MG capsule TAKE 1 CAPSULE BY MOUTH 3 TIMES A DAY 90 capsule 1    linagliptin (TRADJENTA) 5 MG tablet Take 1 tablet by mouth daily 30 tablet 0    amLODIPine (NORVASC) 2.5 MG tablet Take 1 tablet by mouth daily 30 tablet 3    pantoprazole (PROTONIX) 40 MG tablet Take 1 tablet by mouth every morning (before breakfast) 30 tablet 3    sertraline (ZOLOFT) 50 MG tablet Take 1 tablet by mouth daily 30 tablet 5    AMINO ACIDS COMPLEX PO Take 1 each by mouth daily Daily AA drink      Lancets MISC Use to test blood sugars 4 times daily DX:E11.65 200 each 0    blood glucose monitor kit and supplies Accu Chek Sheila meter. Use to test blood sugars DX:E11.65 1 kit 0    blood glucose monitor strips Accucheck Sheila Test Strips Test blood sugars 4 times daily DX:E11.65 400 strip 5    Insulin Syringe-Needle U-100 29G X 1/2\" 0.3 ML MISC 1 each by Does not apply route 4 times daily (after meals and at bedtime) 200 each 0     No current facility-administered medications for this visit.         Family History   Problem Relation Age of Onset    Diabetes Mother     Other Mother         pneumonia, H1N1    Depression Mother     Early Death Mother     High Blood Pressure Mother     High Cholesterol Mother     Vision Loss Maternal Grandmother     Arthritis Maternal Grandfather     Heart Disease Maternal Grandfather         Any family history of any sleep problems or any one in your family on CPAP? Yes    Caffeine Intake: How much soda (pop), coffee, tea, power drinks do you ingest per day? 2 per day. Employment History:  Where do you work? [employer]  What are your shifts? 12 am to 8 am (3 rd shift)    Any recent changes in shifts or hours? No    Physical Exam:    HEIGHTHeight: 5' 3\" (160 cm) WEIGHTWeight: 228 lb (103.4 kg)(patient reported unable to stand on scale)    BMI:  Body mass index is 40.39 kg/m². Neck Size: 19  Oxygen Sat: room air    ESS: 14   Vitals: /78 (Site: Right Lower Arm, Position: Sitting, Cuff Size: Medium Adult)   Pulse 91   Ht 5' 3\" (1.6 m)   Wt 228 lb (103.4 kg) Comment: patient reported unable to stand on scale  SpO2 99% Comment: on room air at rest  BMI 40.39 kg/m²        Mallampati Score: 4    General appearance: alert and oriented to person, place and time and MO BMI 40.39  Nose: Nares normal. Septum midline. Mucosa normal. No drainage or sinus tenderness. Oropharynx: Large tongue, crowded pharynx, mallampati class 4, tonsils grade 1  Lungs: clear to auscultation bilaterally  Heart: regular rate and rhythm, S1, S2 normal, no murmur, click, rub or gallop  Extremities: Right AKA  Neurologic: Mental status: Alert, oriented, thought content appropriate      Assessment    Diagnosis Orders   1. Hypersomnia  Baseline Diagnostic Sleep Study   2. Snoring  Baseline Diagnostic Sleep Study   3. Witnessed episode of apnea  Baseline Diagnostic Sleep Study   4.  Essential hypertension  Baseline Diagnostic Sleep Study       Plan   Orders Placed This Encounter   Procedures    Baseline Diagnostic Sleep Study     Standing Status:   Future     Standing Expiration Date:   2/11/2021     Order Specific Question:   Adult or Pediatric     Answer:   Adult Study (>7 Years)     Order Specific Question:   Location For Sleep Study     Answer:   [facility] Order Specific Question:   Select Sleep Lab Location     Answer:   [facility]      Desensitization and Pre study teaching? No  Weight Loss Information Given? Yes  Sleep Hygiene Discussed?  Yes

## 2020-02-11 NOTE — PROGRESS NOTES
pain or palpitations    History of tobacco abuse     Hx of AKA (above knee amputation), right ([facility] 2019    Dr. Power Heath edema, diabetic, bilateral ([facility] [address] [facility] 2018    Dr. Kapil Grant referred to retina specialist for 2nd opinion    Marijuana abuse in remission     Onychomycosis     Other disorders of kidney and ureter in diseases classified elsewhere     WD-Skin ulcer of fourth toe of right foot with necrosis of bone ([facility] [address] [facility] 2016       Past Surgical History:   Procedure Laterality Date    ABSCESS DRAINAGE Right     foot    AMPUTATION ABOVE KNEE Right 2019    RIGHT ABOVE KNEE AMPUTATION performed by Judah Nuno MD at [facility] Right 2016    I & D    INCISION AND DRAINAGE Right 2019    I&D RIGHT STUMP performed by Judah Nuno MD at [address] [place] [address] Floor Right 2016    LEG AMPUTATION BELOW KNEE Right 2019    I&D AND REVISION OF AMPUTATION RIGHT LEG performed by Judah Nuno MD at [address] [place] [address] Right 1/14/15    sole of foot I&D    [place] DRAIN INFECT SHOULDER BURSA Left 2017    LEFT SHOULDER INCISION AND DRAINAGE performed by Judah Nuno MD at [address] [facility] OFFICE/OUTPT [address] [place] [address] Right 2018    EXCISIONAL DEBRIDEMENT RIGHT BKA STUMP performed by Kirti Benavides MD at [place] [address] Right 1/16/15    2nd toe with wound vac applied    WISDOM TOOTH EXTRACTION  ? when       Social History     Tobacco Use    Smoking status: Former Smoker     Packs/day: 5.00     Years: 13.00     Pack years: 65.00     Types: Cigars     Last attempt to quit: 1985     Years since quittin.1    Smokeless tobacco: Never Used   Substance Use Topics    Alcohol use: Not Currently     Alcohol/week: 0.0 standard drinks    Drug use: No     Comment: as a teenager only       Allergies   Allergen Reactions    Pcn [Penicillins] Shortness Of Breath, Nausea And Vomiting and Other (See Comments)

## 2020-02-12 ENCOUNTER — TELEPHONE (OUTPATIENT)
Dept: PULMONOLOGY | Age: 50
End: 2020-02-12

## 2020-02-16 ENCOUNTER — HOSPITAL ENCOUNTER (OUTPATIENT)
Dept: SLEEP CENTER | Age: 50
Discharge: HOME | End: 2020-02-18
Payer: MEDICARE

## 2020-02-16 PROCEDURE — 95810 POLYSOM 6/> YRS 4/> PARAM: CPT

## 2020-02-17 ENCOUNTER — TELEPHONE (OUTPATIENT)
Dept: PULMONOLOGY | Age: 50
End: 2020-02-17

## 2020-02-17 ENCOUNTER — HOSPITAL ENCOUNTER (OUTPATIENT)
Dept: PHYSICAL THERAPY | Age: 50
Setting detail: THERAPIES SERIES
End: 2020-02-17
Payer: MEDICARE

## 2020-02-17 LAB — STATUS: NORMAL

## 2020-02-18 ENCOUNTER — CARE COORDINATION (OUTPATIENT)
Dept: CARE COORDINATION | Age: 50
End: 2020-02-18

## 2020-02-18 NOTE — CARE COORDINATION
Received call from Kelsea Ramírez today. States that he has tried taking the Metformin XR, but continues to have diarrhea with this also. Took last dose yesterday. Opting to not continue d/t diarrhea. Pt had recent sleep study and states that he has sleep apnea and will be getting a CPAP machine. Reviewed notes and reminded pt that he needs to contact office as he wants to proceed with getting titration study set up. Reports that BS's have been doing very well. Has had a couple of BS's in the 80's causing him to feel symptomatic. Reminded pt to take only small amt of carb/sugar to get BS up enough so symptoms resolve. BS this AM was 165. Requesting to reschedule PCP appt. appt arranged for tomorrow at 240pm pt reports that he will have transportation. Using blister packs for medications and feels this is very helpful. Going through outpt PT currently and continues to work with Chris Farmer at [facility] for prosthesis. .  Encouraged to call with any new concerns.

## 2020-02-18 NOTE — TELEPHONE ENCOUNTER
Phoned patient, patient was transferred to sleep lab to schedule. Titration scheduled for 2/19/20. Patient to f/u 8wks with Mayur Burkett.

## 2020-02-19 ENCOUNTER — OFFICE VISIT (OUTPATIENT)
Dept: INTERNAL MEDICINE CLINIC | Age: 50
End: 2020-02-19
Payer: MEDICARE

## 2020-02-19 ENCOUNTER — HOSPITAL ENCOUNTER (OUTPATIENT)
Dept: SLEEP CENTER | Age: 50
Discharge: HOME | End: 2020-02-21
Payer: MEDICARE

## 2020-02-19 VITALS
HEIGHT: 63 IN | SYSTOLIC BLOOD PRESSURE: 137 MMHG | HEART RATE: 98 BPM | DIASTOLIC BLOOD PRESSURE: 89 MMHG | WEIGHT: 228 LBS | BODY MASS INDEX: 40.4 KG/M2

## 2020-02-19 PROCEDURE — 99214 OFFICE O/P EST MOD 30 MIN: CPT | Performed by: NURSE PRACTITIONER

## 2020-02-19 PROCEDURE — 1036F TOBACCO NON-USER: CPT | Performed by: NURSE PRACTITIONER

## 2020-02-19 PROCEDURE — 95811 POLYSOM 6/>YRS CPAP 4/> PARM: CPT

## 2020-02-19 PROCEDURE — G8484 FLU IMMUNIZE NO ADMIN: HCPCS | Performed by: NURSE PRACTITIONER

## 2020-02-19 PROCEDURE — 3046F HEMOGLOBIN A1C LEVEL >9.0%: CPT | Performed by: NURSE PRACTITIONER

## 2020-02-19 PROCEDURE — 3017F COLORECTAL CA SCREEN DOC REV: CPT | Performed by: NURSE PRACTITIONER

## 2020-02-19 PROCEDURE — G8417 CALC BMI ABV UP PARAM F/U: HCPCS | Performed by: NURSE PRACTITIONER

## 2020-02-19 PROCEDURE — G8427 DOCREV CUR MEDS BY ELIG CLIN: HCPCS | Performed by: NURSE PRACTITIONER

## 2020-02-19 PROCEDURE — 2022F DILAT RTA XM EVC RTNOPTHY: CPT | Performed by: NURSE PRACTITIONER

## 2020-02-19 RX ORDER — PANTOPRAZOLE SODIUM 40 MG/1
40 TABLET, DELAYED RELEASE ORAL 2 TIMES DAILY
Qty: 30 TABLET | Refills: 3 | Status: ON HOLD
Start: 2020-02-19 | End: 2020-03-03 | Stop reason: HOSPADM

## 2020-02-19 RX ORDER — LANCETS 30 GAUGE
EACH MISCELLANEOUS
Qty: 200 EACH | Refills: 0 | Status: SHIPPED | OUTPATIENT
Start: 2020-02-19 | End: 2020-07-06 | Stop reason: SDUPTHER

## 2020-02-19 RX ORDER — AMLODIPINE BESYLATE 2.5 MG/1
2.5 TABLET ORAL DAILY
Qty: 30 TABLET | Refills: 3 | Status: ON HOLD
Start: 2020-02-19 | End: 2020-05-14 | Stop reason: HOSPADM

## 2020-02-19 ASSESSMENT — ENCOUNTER SYMPTOMS
DIARRHEA: 1
CONSTIPATION: 0
PHOTOPHOBIA: 0
CHOKING: 0
COUGH: 0
BLOOD IN STOOL: 0
TROUBLE SWALLOWING: 0
VOMITING: 0
NAUSEA: 1
ABDOMINAL PAIN: 0
SHORTNESS OF BREATH: 0

## 2020-02-19 NOTE — PATIENT INSTRUCTIONS
stitch the pocket shut. This will help keep you from sleeping on your back. · Avoid alcohol and medicines such as sleeping pills and sedatives before bed. · Do not smoke. Smoking can make sleep apnea worse. If you need help quitting, talk to your doctor about stop-smoking programs and medicines. These can increase your chances of quitting for good. · Prop up the head of your bed 4 to 6 inches by putting bricks under the legs of the bed. · Treat breathing problems, such as a stuffy nose, caused by a cold or allergies. · Try a continuous positive airway pressure (CPAP) breathing machine if your doctor recommends it. The machine keeps your airway open when you sleep. · If CPAP does not work for you, ask your doctor if you can try other breathing machines. A bilevel positive airway pressure machine uses one type of air pressure for breathing in and another type for breathing out. Another device raises or lowers air pressure as needed while you breathe. · Talk to your doctor if:  ? Your nose feels dry or bleeds when you use one of these machines. You may need to increase moisture in the air. A humidifier may help. ? Your nose is runny or stuffy from using a breathing machine. Decongestants or a corticosteroid nasal spray may help. ? You are sleepy during the day and it gets in the way of the normal things you do. Do not drive when you are drowsy. When should you call for help? Watch closely for changes in your health, and be sure to contact your doctor if:    · You still have sleep apnea even though you have made lifestyle changes.     · You are thinking of trying a device such as CPAP.     · You are having problems using a CPAP or similar machine. Where can you learn more? Go to [email or web address] org and sign in to your [employer] account. Enter Q287 in the [employer] box to learn more about \"Sleep Apnea: Care Instructions. \"     If you do not have an account, please click on the \"Sign Up Now\" link. Current as of: June 9, 2019  Content Version: 12.3  © 5448-5882 Healthwise, Incorporated. Care instructions adapted under license by [facility] ([facility]). If you have questions about a medical condition or this instruction, always ask your healthcare professional. [place] [address] any warranty or liability for your use of this information.

## 2020-02-19 NOTE — PROGRESS NOTES
injection pen, Inject 80 Units into the skin 2 times daily, Disp: 30 pen, Rfl: 5    pioglitazone (ACTOS) 30 MG tablet, Take 1 tablet by mouth daily, Disp: 30 tablet, Rfl: 2    gabapentin (NEURONTIN) 300 MG capsule, TAKE 1 CAPSULE BY MOUTH 3 TIMES A DAY, Disp: 90 capsule, Rfl: 1    AMINO ACIDS COMPLEX PO, Take 1 each by mouth daily Daily AA drink, Disp: , Rfl:     blood glucose monitor kit and supplies, Accu Chek Sheila meter. Use to test blood sugars DX:E11.65, Disp: 1 kit, Rfl: 0    blood glucose monitor strips, Accucheck Sheila Test Strips Test blood sugars 4 times daily DX:E11.65, Disp: 400 strip, Rfl: 5    Insulin Syringe-Needle U-100 29G X 1/2\" 0.3 ML MISC, 1 each by Does not apply route 4 times daily (after meals and at bedtime), Disp: 200 each, Rfl: 0    The patient is allergic to pcn [penicillins] and clindamycin/lincomycin. Past Medical History  Lana Arredondo  has a past medical history of Bipolar 2 disorder ([address] Diabetes mellitus type 2, uncontrolled ([address] Diabetic polyneuropathy ([address] Diabetic ulcer of right foot associated with type 2 diabetes mellitus ([address] Essential hypertension, GERD (gastroesophageal reflux disease), Hammer toe of left foot, Heart murmur, History of tobacco abuse, Hx of AKA (above knee amputation), right ([address] Macular edema, diabetic, bilateral ([address] Marijuana abuse in remission, Onychomycosis, Other disorders of kidney and ureter in diseases classified elsewhere, and 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 Past Surgical History  The patient  has a past surgical history that includes other surgical history (Right, 1/14/15); Abscess Drainage (Right); Toe amputation (Right, 1/16/15); Foot Debridement (Right, 07/01/2016); Leg amputation below knee (Right, 07/20/2016);  [place] tooth extraction (?when); pr drain infect shoulder bursa (Left, 8/18/2017); pr office/outpt visit,procedure only (Right, 9/20/2018); incision and drainage (Right, 2/18/2019); Leg amputation below knee (Right, 4/4/2019); and AMPUTATION ABOVE KNEE (Right, 4/18/2019). Family History  This patient's family history includes Arthritis in his maternal grandfather; Depression in his mother; Diabetes in his mother; Early Death in his mother; Heart Disease in his maternal grandfather; High Blood Pressure in his mother; High Cholesterol in his mother; Other in his mother; Vision Loss in his maternal grandmother. Social History  Jase Tom  reports that he quit smoking about 35 years ago. His smoking use included cigars. He has a 65.00 pack-year smoking history. He has never used smokeless tobacco. He reports previous alcohol use. He reports that he does not use drugs. Health Maintenance:    Health Maintenance   Topic Date Due    DTaP/Tdap/Td vaccine (1 - Tdap) 01/23/1979    Hepatitis B vaccine (1 of 3 - Risk 3-dose series) 01/23/1987    Shingles Vaccine (1 of 2) 01/23/2018    Colon cancer screen colonoscopy  01/23/2018    Diabetic retinal exam  05/09/2019    Annual Wellness Visit (AWV)  06/04/2019    Flu vaccine (1) 09/01/2019    Diabetic microalbuminuria test  10/10/2019    Lipid screen  10/10/2019    Diabetic foot exam  02/01/2020    A1C test (Diabetic or Prediabetic)  03/18/2020    Potassium monitoring  12/19/2020    Creatinine monitoring  12/19/2020    Pneumococcal 0-64 years Vaccine  Completed    HIV screen  Completed    Hepatitis A vaccine  Aged Out    Hib vaccine  Aged Out    Meningococcal (ACWY) vaccine  Aged Out       Subjective:      Review of Systems   Constitutional: Negative for chills, fatigue and fever. HENT: Negative for trouble swallowing. Eyes: Negative for photophobia and visual disturbance. Respiratory: Negative for cough, choking and shortness of breath. Cardiovascular: Negative for chest pain, palpitations and leg swelling. Gastrointestinal: Positive for diarrhea and nausea.  Negative for abdominal pain, blood in stool, constipation and vomiting. Black Stools  Gas   Endocrine: Negative for cold intolerance, heat intolerance, polydipsia, polyphagia and polyuria. Genitourinary: Negative for difficulty urinating, dysuria and hematuria. Musculoskeletal: Negative for arthralgias and myalgias. Skin: Negative for rash and wound. Neurological: Negative for dizziness, tremors, weakness, light-headedness, numbness and headaches. Psychiatric/Behavioral: Negative for agitation, sleep disturbance and suicidal ideas. The patient is not nervous/anxious. Objective:     /89 (Site: Right Upper Arm, Position: Sitting, Cuff Size: Large Adult)   Pulse 98   Ht 5' 3\" (1.6 m)   Wt 228 lb (103.4 kg)   BMI 40.39 kg/m²     Physical Exam  Vitals signs reviewed. Constitutional:       General: He is not in acute distress. Appearance: He is well-developed. He is not ill-appearing or diaphoretic. HENT:      Head: Normocephalic and atraumatic. Eyes:      General: No scleral icterus. Right eye: No discharge. Left eye: No discharge. Pupils: Pupils are equal, round, and reactive to light. Neck:      Musculoskeletal: Normal range of motion and neck supple. Cardiovascular:      Rate and Rhythm: Normal rate and regular rhythm. Pulses:           Radial pulses are 2+ on the right side and 2+ on the left side. Right popliteal pulse not accessible. Dorsalis pedis pulses are 1+ on the left side. Right dorsalis pedis pulse not accessible. Right posterior tibial pulse not accessible. Heart sounds: Normal heart sounds. No murmur. No friction rub. No gallop. Pulmonary:      Effort: Pulmonary effort is normal. No respiratory distress. Breath sounds: Normal breath sounds. No decreased breath sounds, wheezing or rales. Abdominal:      General: There is no distension. Palpations: Abdomen is soft. Tenderness: There is no abdominal tenderness. There is no guarding.    Musculoskeletal: Normal range of motion. General: No tenderness or deformity. Left lower le+ Pitting Edema present. Feet:      Right foot:      Amputation: Right leg is amputated above knee. Left foot:      Amputation: (Left 5th toe)     Protective Sensation: 8 sites tested. 0 sites sensed. Skin:     General: Skin is warm and dry. Coloration: Skin is not pale. Findings: No erythema or rash. Neurological:      Mental Status: He is alert and oriented to person, place, and time. GCS: GCS eye subscore is 4. GCS verbal subscore is 5. GCS motor subscore is 6. Cranial Nerves: No cranial nerve deficit. Labs Reviewed 2020:    Lab Results   Component Value Date    WBC 10.6 2019    HGB 14.2 2019    HCT 42.3 2019     2019    CHOL 161 10/10/2018    TRIG 328 (H) 10/10/2018    HDL 30 10/10/2018    ALT 23 2019    AST 29 2019     (L) 2019    K 3.6 2019    CL 92 (L) 2019    CREATININE 0.7 2019    BUN 14 2019    CO2 23 2019    TSH 2.700 10/15/2019    INR 0.97 2019    GLUF 179 (H) 2016    LABA1C 11.4 (H) 2019    LABMICR 1.21 10/10/2018       Assessment/Plan      1. Uncontrolled type 2 diabetes mellitus with hyperglycemia, with long-term current use of insulin (HCC)  Decrease mealtime insulin to 20 units with meals. Bring meter for download in 1 week. - linagliptin (TRADJENTA) 5 MG tablet; Take 1 tablet by mouth daily  Dispense: 30 tablet; Refill: 5  - Microalbumin / Creatinine Urine Ratio; Future  - insulin aspart (NOVOLOG FLEXPEN) 100 UNIT/ML injection pen; Inject 20 Units into the skin 3 times daily (before meals)  Dispense: 5 pen; Refill: 3  - insulin detemir (LEVEMIR FLEXTOUCH) 100 UNIT/ML injection pen; Inject 80 Units into the skin 2 times daily  Dispense: 30 pen; Refill: 5  - Lancets MISC; Use to test blood sugars 4 times daily DX:E11.65  Dispense: 200 each; Refill: 0    2.

## 2020-02-20 LAB — STATUS: NORMAL

## 2020-02-21 ENCOUNTER — HOSPITAL ENCOUNTER (OUTPATIENT)
Dept: PHYSICAL THERAPY | Age: 50
Setting detail: THERAPIES SERIES
End: 2020-02-21
Payer: MEDICARE

## 2020-02-23 NOTE — PROGRESS NOTES
[address] [place] [address]                               SLEEP STUDY REPORT    PATIENT NAME: Donovan Krause                     :        1970  MED REC NO:   [id number]                           ROOM:  ACCOUNT NO:   [de-identified]                           ADMIT DATE: 2020  PROVIDER:     RICK Oneal STUDY:  2020    REFERRING PROVIDER:  Nancy Perdue CNP    HISTORY OF PRESENT ILLNESS:  The patient is a 70-year-old gentleman  recently diagnosed with severe obstructive sleep apnea. He is brought  back to the sleep lab for PAP titration polysomnogram.  Weight 228  pounds, height 63 inches, BMI 40.4. METHODS:  The patient underwent digital polysomnography in compliance  with the standards and specifications from the AASM Manual including the  simultaneous recording of 3 EEG channels (F4-M1, C4-M1, and O2-M1 with  back up electrodes F3-M2, C3-M2, and O1-M2), 2 EOG channels (E1-M2, and  E2-M1,), EMG (chin, left & right leg), EKG, Nonin pulse oximetry with  less than 2 second averaging time, body position, airflow recorded by  oral-nasal thermal sensor and nasal air pressure transducer, plus  respiratory effort recorded by calibrated respiratory inductance  plethysmography (RIP), flow volume loop, sound and video. Sleep staging  and scoring followed the standard put forth by the American Academy of  Sleep Medicine and utilized the 4A obstructive hypopnea event  desaturation of 4 percent or greater. DETAILS OF THE STUDY:  Lights out 09:39 p.m. Lights on 05:11 a.m. Total recording time 452 minutes, sleep period time 441 minutes, total  sleep time 440.9 minutes, sleep efficiency 97%, latency to sleep 11  minutes, wake after sleep onset 0.5 minutes, latency to  minutes.     SLEEP STAGING:  The patient slept 6 minutes in N1 sleep, 262 minutes in  N2 sleep, 31 minutes in N3 sleep, 140 minutes in REM sleep.    BODY POSITION SUMMARY:  168 minutes of supine sleep, 235 minutes of left  lateral sleep, 36 minutes of right lateral sleep. LIMB MOVEMENT SUMMARY:  There were none. ECG SUMMARY:  The patient remained in normal sinus rhythm through the  night. PAP TITRATION SUMMARY:  CPAP was initiated at 5 cm of water pressure and  increased to a maximal pressure of 13. This pressure was insufficient  to control the obstructive events with an AHI of 11.8 and the patient  did not tolerate further pressure increases. At that time, he was  switched to BiPAP 16/12 and increased to a maximal pressure of 20/16  although the final pressure of 20/16 controlled some of the obstructive  events compared with the diagnostic polysomnogram (AHI 35.2). The residual  AHI was still 11.7 wand  did not completely control the obstructive  events. ASSESSMENT:  1. Severe obstructive sleep apnea. 2.  Unsuccessful PAP titration with neither CPAP or BiPAP controlling the obstructive  events at the pressures used during this titration procedure. RECOMMENDATIONS:  To initiate auto BiPAP with a maximal IPAP of 25 cm of  water, minimal EPAP of 8 cm of water, pressure support of 4. Chosen  interface was F20 large size full face interface. Follow up in the  sleep clinic eight weeks after initiating PAP therapies to review the  download.         Mustapha Iniguez M.D.    D: 02/21/2020 22:44:58       T: 02/22/2020 3:05:43     [email or web address]  Job#: [id number]     Doc#: [id number]    CC:

## 2020-02-24 ENCOUNTER — APPOINTMENT (OUTPATIENT)
Dept: PHYSICAL THERAPY | Age: 50
End: 2020-02-24
Payer: MEDICARE

## 2020-02-24 ENCOUNTER — HOSPITAL ENCOUNTER (OUTPATIENT)
Dept: PHYSICAL THERAPY | Age: 50
Setting detail: THERAPIES SERIES
Discharge: HOME | End: 2020-02-24
Payer: MEDICARE

## 2020-02-24 PROCEDURE — 97110 THERAPEUTIC EXERCISES: CPT

## 2020-02-24 NOTE — PROGRESS NOTES
[place]     Time In: 1300  Time Out: 6110  Minutes: 45  Timed Code Treatment Minutes: 45 Minutes     Date: 2020  Patient Name: Ilsa Sacks,  Gender:  male        CSN: [id number]   : 1970  (44 y.o.)       Referring Practitioner: MARILYN Chang CNP      Diagnosis: Z89.511 (ICD-10-CM) - Status post below knee amputation of right lower extremity ([facility] [address] R26.9 (ICD-10-CM) - Gait disturbance  Treatment Diagnosis: right AK amputation with gait training with AK prosthesis and functional mobility. Additional Pertinent Hx: comorbidities: right above knee amputation 19, , DM, bipolar        General:  PT Visit Information  Onset Date: 20  PT Insurance Information: MEDICARE - Unlimited visits based on medical necessity. Aquatics and modalities are covered except ionto and hot/cold packs. Total # of Visits to Date: 3  Plan of Care/Certification Expiration Date: 20  Progress Note Counter: 3/10 for PN                Subjective:  Family / Caregiver Present: Yes  Comments: Follow up with Cruzito Ferrari CNP on 20   Other (Comment): prosthetic gait training -     Subjective: Patient does not have prosthesis today because it is getting a new socket put on. Patient plans to get it back tomorrow. Pain:  Patient Currently in Pain: No     Objective  Exercises  Exercise 3: Transfers and bed mobility modified independent. Exercise 4: // bars: left single squat 2x5, left single heel raise x5 with decreased heel clearance and ankle instability noted with tendency to roll out and supinate forefoot. Exercise 5: OT UBE for endurance x5 minutes work load 120 with patient sitting in wheelchair.    Exercise 7: LE strengthening: supine hip flexion, prone hip extension, sidelying hip abduction and adduction 15x each bilateral, prone hamstring curl 15x Left,   Exercise 8: Supine single leg bridge 10x 5 seconds. Exercise 9: Left ankle 4-way ankle 10x (attempted with orange band but unable to move.) Seated heel/toe raises 10x Left. Exercise 10: Seated hip abduction with purple band 10x, seated Left LE hamstring curl with purple band 10x. Activity Tolerance:  Activity Tolerance: Patient Tolerated treatment well    Assessment: Body structures, Functions, Activity limitations: Decreased functional mobility , Decreased sensation, Decreased strength, Decreased balance  Assessment: Weakness noted with prone hip extension bilaterally and sidelying hip adduction. Significant weakness also noted at Left ankle in all planes. Focused session today on strengthening as patient does not get his prosthesis back until tomorrow. Prognosis: Good  Discharge Recommendations: Continue to assess pending progress    Patient Education:  PT Education: Goals, PT Role, Plan of Care, Gait Training, Transfer Training, Home Exercise Program  Patient Education: Continue strengthening and ROM exercises. Plan:  Times per week: 2x  Plan weeks: 12 weeks  Specific instructions for Next Treatment: instruction donning /doffing prosthesis, static stance in // bars for balance, gait training initiate in // bars and progress to roller walker when able, Progress as able. UE UBE for endurance, USE OT BIODEX. UE strengthening program as well update. Current Treatment Recommendations: Strengthening, Balance Training, Home Exercise Program, Functional Mobility Training, Transfer Training, Gait Training, Endurance Training, Patient/Caregiver Education & Training  Plan Comment: Continue with current POC    Goals:  Patient goals : To walk with prosthesis and be more mobile with resuming activities during day as before.      Short term goals  Time Frame for Short term goals: 4 weeks   Short term goal 1: Patient to demonstrate increased balance with prosthetic Right Above knee prosthesis with ability to stand without support of // bars in stance

## 2020-02-26 ENCOUNTER — HOSPITAL ENCOUNTER (OUTPATIENT)
Dept: WOUND CARE | Age: 50
Discharge: HOME | End: 2020-02-26
Payer: MEDICARE

## 2020-02-26 VITALS
TEMPERATURE: 98.4 F | RESPIRATION RATE: 16 BRPM | HEIGHT: 63 IN | HEART RATE: 91 BPM | BODY MASS INDEX: 40.4 KG/M2 | WEIGHT: 228 LBS | DIASTOLIC BLOOD PRESSURE: 75 MMHG | OXYGEN SATURATION: 98 % | SYSTOLIC BLOOD PRESSURE: 136 MMHG

## 2020-02-26 PROCEDURE — 99212 OFFICE O/P EST SF 10 MIN: CPT

## 2020-02-26 ASSESSMENT — PAIN DESCRIPTION - ORIENTATION: ORIENTATION: RIGHT;LEFT

## 2020-02-26 ASSESSMENT — PAIN DESCRIPTION - DESCRIPTORS: DESCRIPTORS: SHARP;STABBING

## 2020-02-26 ASSESSMENT — PAIN DESCRIPTION - PAIN TYPE: TYPE: CHRONIC PAIN

## 2020-02-26 ASSESSMENT — PAIN DESCRIPTION - FREQUENCY: FREQUENCY: CONTINUOUS

## 2020-02-26 ASSESSMENT — PAIN DESCRIPTION - ONSET: ONSET: ON-GOING

## 2020-02-26 ASSESSMENT — PAIN SCALES - GENERAL: PAINLEVEL_OUTOF10: 9

## 2020-02-26 ASSESSMENT — PAIN DESCRIPTION - PROGRESSION: CLINICAL_PROGRESSION: NOT CHANGED

## 2020-02-26 ASSESSMENT — PAIN DESCRIPTION - LOCATION: LOCATION: HAND

## 2020-02-26 NOTE — PROGRESS NOTES
[address] [facility]          Progress Note and Procedure Note      [address] [facility] RECORD NUMBER:  [id number]  AGE: 44 y.o. GENDER: male  : 1970  EPISODE DATE:  2020    Subjective:     SUBJECTIVE     Chief Complaint   Patient presents with    Wound Check     left leg            HISTORY OF PRESENT ILLNESS      Edwina Workman is a 44 y.o. male who presents today for wound/ulcer evaluation. History of Wound Context: He is a 49-year-old male patient with history of diabetes previous history of right above-knee amputation had a nonhealing wound on his left leg with cellulitis today he was seen for follow-up visit the left leg cellulitis and open wound has completely healed he has no new complaints  Taking his medications regularly. PAST MEDICAL HISTORY         Diagnosis Date    Bipolar 2 disorder [facility])     previously followed with Dr. Lakeshia Giang and Brent Tavares in [facility] Diabetes mellitus type 2, uncontrolled ([address]     HgbA1c on 2019 was 9.1.     Diabetic polyneuropathy ([address]     Diabetic ulcer of right foot associated with type 2 diabetes mellitus ([address] 12/10/2015    Essential hypertension     \"never been on b/p medication that I know of\"    GERD (gastroesophageal reflux disease)     Hammer toe of left foot     Heart murmur     denies any chest pain or palpitations    History of tobacco abuse     Hx of AKA (above knee amputation), right ([address] 2019    Dr. Gregory Hall edema, diabetic, bilateral ([address] 2018    Dr. Leeroy St referred to retina specialist for 2nd opinion    Marijuana abuse in remission     Onychomycosis     Other disorders of kidney and ureter in diseases classified elsewhere     WD-Skin ulcer of fourth toe of right foot with necrosis of bone ([address] 2016         PAST SURGICAL HISTORY     Past Surgical History:   Procedure Laterality Date    ABSCESS DRAINAGE Right     foot    AMPUTATION ABOVE KNEE Right Gastroesophageal reflux disease without esophagitis K21.9    History of marijuana use Z87.898    Physical debility R53.81    Anemia D64.9    Electrolyte imbalance E87.8    Weakness R53.1    Infection of above knee amputation stump of right leg ([facility]) T87.43    Above knee amputation of right lower extremity ([facility]) B58.818C    Sepsis (HCC) A41.9    Vitamin D deficiency E55.9    COPD exacerbation ([facility]) J44.1    Influenza B J10.1    Depression, recurrent ([facility]) F33.9    Major depressive disorder, recurrent ([facility] [address] F33.9       Plan:     Patient examined and evaluated    Treatment: No orders of the defined types were placed in this encounter. Please see attached Discharge Instructions    Written patient dismissal instructions given to patient and signed by patient or POA. Discharge Instructions       Visit Discharge/Physician Orders:  - Apply moisturizer to left leg/foot once daily. Do not apply between toes      Wound Location: Left leg - healed      Follow up visit:   discharged    Keep next scheduled appointment. Please give 24 hour notice if unable to keep appointment. [phone]     If you experience any of the following, please call the Wound Care Service during business hours: Monday through Friday 8:00 am - 4:30 pm  ([phone]).               *Increase in pain              *Temperature over 101              *Increase in drainage from your wound or a foul odor              *Uncontrolled swelling              *Need for compression bandage changes due to slippage, breakthrough drainage     If you need medical attention outside of business hours, please contact your Primary Care Doctor or go to the nearest emergency room        Electronically signed by Aleida Chandler MD on 2/26/2020 at 11:45 AM

## 2020-02-26 NOTE — PLAN OF CARE
Problem: Wound:  Goal: Will show signs of wound healing; wound closure and no evidence of infection  Description  Will show signs of wound healing; wound closure and no evidence of infection  Outcome: Completed   Pt presents to wound clinic for follow up of left leg wound. Wound is healed. No s/s of infection. Pt was afebrile. See avs for discharge instructions. Pt discharged. Care plan reviewed with patient. Patient verbalize understanding of the plan of care and contribute to goal setting.

## 2020-02-28 ENCOUNTER — HOSPITAL ENCOUNTER (OUTPATIENT)
Dept: PHYSICAL THERAPY | Age: 50
Setting detail: THERAPIES SERIES
End: 2020-02-28
Payer: MEDICARE

## 2020-03-02 ENCOUNTER — HOSPITAL ENCOUNTER (INPATIENT)
Age: 50
LOS: 2 days | Discharge: HOME | DRG: 378 | End: 2020-03-04
Attending: INTERNAL MEDICINE | Admitting: INTERNAL MEDICINE
Payer: MEDICARE

## 2020-03-02 ENCOUNTER — APPOINTMENT (OUTPATIENT)
Dept: CT IMAGING | Age: 50
DRG: 378 | End: 2020-03-02
Payer: MEDICARE

## 2020-03-02 PROBLEM — K92.2 GI BLEED: Status: ACTIVE | Noted: 2020-03-02

## 2020-03-02 LAB
ABO: NORMAL
ALBUMIN SERPL-MCNC: 4.3 G/DL (ref 3.5–5.1)
ALP BLD-CCNC: 76 U/L (ref 38–126)
ALT SERPL-CCNC: 18 U/L (ref 11–66)
ANION GAP SERPL CALCULATED.3IONS-SCNC: 15 MEQ/L (ref 8–16)
ANTIBODY SCREEN: NORMAL
AST SERPL-CCNC: 24 U/L (ref 5–40)
BACTERIA: ABNORMAL /HPF
BASOPHILS # BLD: 0.7 %
BASOPHILS ABSOLUTE: 0.1 THOU/MM3 (ref 0–0.1)
BILIRUB SERPL-MCNC: 0.4 MG/DL (ref 0.3–1.2)
BILIRUBIN URINE: NEGATIVE
BLOOD, URINE: NEGATIVE
BUN BLDV-MCNC: 13 MG/DL (ref 7–22)
CALCIUM SERPL-MCNC: 9.3 MG/DL (ref 8.5–10.5)
CASTS 2: ABNORMAL /LPF
CASTS UA: ABNORMAL /LPF
CHARACTER, URINE: CLEAR
CHLORIDE BLD-SCNC: 96 MEQ/L (ref 98–111)
CO2: 20 MEQ/L (ref 23–33)
COLOR: YELLOW
CREAT SERPL-MCNC: 0.8 MG/DL (ref 0.4–1.2)
CRYSTALS, UA: ABNORMAL
EKG ATRIAL RATE: 92 BPM
EKG P AXIS: 47 DEGREES
EKG P-R INTERVAL: 166 MS
EKG Q-T INTERVAL: 356 MS
EKG QRS DURATION: 80 MS
EKG QTC CALCULATION (BAZETT): 440 MS
EKG R AXIS: 23 DEGREES
EKG T AXIS: 34 DEGREES
EKG VENTRICULAR RATE: 92 BPM
EOSINOPHIL # BLD: 5.4 %
EOSINOPHILS ABSOLUTE: 0.7 THOU/MM3 (ref 0–0.4)
EPITHELIAL CELLS, UA: ABNORMAL /HPF
ERYTHROCYTE [DISTWIDTH] IN BLOOD BY AUTOMATED COUNT: 13.8 % (ref 11.5–14.5)
ERYTHROCYTE [DISTWIDTH] IN BLOOD BY AUTOMATED COUNT: 43.7 FL (ref 35–45)
GFR SERPL CREATININE-BSD FRML MDRD: > 90 ML/MIN/1.73M2
GLUCOSE BLD-MCNC: 149 MG/DL (ref 70–108)
GLUCOSE BLD-MCNC: 260 MG/DL (ref 70–108)
GLUCOSE URINE: >= 1000 MG/DL
HCT VFR BLD CALC: 46.5 % (ref 42–52)
HEMOCCULT STL QL: NEGATIVE
HEMOGLOBIN: 14.6 GM/DL (ref 14–18)
HEMOGLOBIN: 15.7 GM/DL (ref 14–18)
IMMATURE GRANS (ABS): 0.11 THOU/MM3 (ref 0–0.07)
IMMATURE GRANULOCYTES: 0.8 %
KETONES, URINE: ABNORMAL
LACTIC ACID: 1.4 MMOL/L (ref 0.5–2.2)
LACTIC ACID: 3.8 MMOL/L (ref 0.5–2.2)
LEUKOCYTE ESTERASE, URINE: NEGATIVE
LIPASE: 43.6 U/L (ref 5.6–51.3)
LYMPHOCYTES # BLD: 21 %
LYMPHOCYTES ABSOLUTE: 2.9 THOU/MM3 (ref 1–4.8)
MCH RBC QN AUTO: 29.4 PG (ref 26–33)
MCHC RBC AUTO-ENTMCNC: 33.8 GM/DL (ref 32.2–35.5)
MCV RBC AUTO: 87.1 FL (ref 80–94)
MISCELLANEOUS 2: ABNORMAL
MONOCYTES # BLD: 8 %
MONOCYTES ABSOLUTE: 1.1 THOU/MM3 (ref 0.4–1.3)
NITRITE, URINE: NEGATIVE
NUCLEATED RED BLOOD CELLS: 0 /100 WBC
OSMOLALITY CALCULATION: 271.7 MOSMOL/KG (ref 275–300)
PH UA: 5 (ref 5–9)
PLATELET # BLD: 266 THOU/MM3 (ref 130–400)
PMV BLD AUTO: 9.5 FL (ref 9.4–12.4)
POTASSIUM SERPL-SCNC: 4.5 MEQ/L (ref 3.5–5.2)
PROTEIN UA: 30
RBC # BLD: 5.34 MILL/MM3 (ref 4.7–6.1)
RBC URINE: ABNORMAL /HPF
RENAL EPITHELIAL, UA: ABNORMAL
RH FACTOR: NORMAL
SEG NEUTROPHILS: 64.1 %
SEGMENTED NEUTROPHILS ABSOLUTE COUNT: 8.7 THOU/MM3 (ref 1.8–7.7)
SODIUM BLD-SCNC: 131 MEQ/L (ref 135–145)
SPECIFIC GRAVITY, URINE: > 1.03 (ref 1–1.03)
TOTAL PROTEIN: 8.6 G/DL (ref 6.1–8)
TROPONIN T: < 0.01 NG/ML
UROBILINOGEN, URINE: 0.2 EU/DL (ref 0–1)
WBC # BLD: 13.6 THOU/MM3 (ref 4.8–10.8)
WBC UA: ABNORMAL /HPF
YEAST: ABNORMAL

## 2020-03-02 PROCEDURE — C9113 INJ PANTOPRAZOLE SODIUM, VIA: HCPCS | Performed by: PHYSICIAN ASSISTANT

## 2020-03-02 PROCEDURE — 6360000002 HC RX W HCPCS: Performed by: INTERNAL MEDICINE

## 2020-03-02 PROCEDURE — 96374 THER/PROPH/DIAG INJ IV PUSH: CPT

## 2020-03-02 PROCEDURE — 36415 COLL VENOUS BLD VENIPUNCTURE: CPT

## 2020-03-02 PROCEDURE — 82948 REAGENT STRIP/BLOOD GLUCOSE: CPT

## 2020-03-02 PROCEDURE — C9113 INJ PANTOPRAZOLE SODIUM, VIA: HCPCS | Performed by: STUDENT IN AN ORGANIZED HEALTH CARE EDUCATION/TRAINING PROGRAM

## 2020-03-02 PROCEDURE — 99223 1ST HOSP IP/OBS HIGH 75: CPT | Performed by: PHYSICIAN ASSISTANT

## 2020-03-02 PROCEDURE — 85025 COMPLETE CBC W/AUTO DIFF WBC: CPT

## 2020-03-02 PROCEDURE — 2580000003 HC RX 258: Performed by: INTERNAL MEDICINE

## 2020-03-02 PROCEDURE — 2580000003 HC RX 258: Performed by: PHYSICIAN ASSISTANT

## 2020-03-02 PROCEDURE — 93010 ELECTROCARDIOGRAM REPORT: CPT | Performed by: NUCLEAR MEDICINE

## 2020-03-02 PROCEDURE — C9113 INJ PANTOPRAZOLE SODIUM, VIA: HCPCS | Performed by: INTERNAL MEDICINE

## 2020-03-02 PROCEDURE — 82272 OCCULT BLD FECES 1-3 TESTS: CPT

## 2020-03-02 PROCEDURE — 2709999900 HC NON-CHARGEABLE SUPPLY

## 2020-03-02 PROCEDURE — 83690 ASSAY OF LIPASE: CPT

## 2020-03-02 PROCEDURE — 6360000002 HC RX W HCPCS: Performed by: PHYSICIAN ASSISTANT

## 2020-03-02 PROCEDURE — 81001 URINALYSIS AUTO W/SCOPE: CPT

## 2020-03-02 PROCEDURE — 84484 ASSAY OF TROPONIN QUANT: CPT

## 2020-03-02 PROCEDURE — 1200000003 HC TELEMETRY R&B

## 2020-03-02 PROCEDURE — 86901 BLOOD TYPING SEROLOGIC RH(D): CPT

## 2020-03-02 PROCEDURE — 83605 ASSAY OF LACTIC ACID: CPT

## 2020-03-02 PROCEDURE — 74177 CT ABD & PELVIS W/CONTRAST: CPT

## 2020-03-02 PROCEDURE — 6370000000 HC RX 637 (ALT 250 FOR IP): Performed by: STUDENT IN AN ORGANIZED HEALTH CARE EDUCATION/TRAINING PROGRAM

## 2020-03-02 PROCEDURE — 86900 BLOOD TYPING SEROLOGIC ABO: CPT

## 2020-03-02 PROCEDURE — 86850 RBC ANTIBODY SCREEN: CPT

## 2020-03-02 PROCEDURE — 6360000002 HC RX W HCPCS: Performed by: STUDENT IN AN ORGANIZED HEALTH CARE EDUCATION/TRAINING PROGRAM

## 2020-03-02 PROCEDURE — 85018 HEMOGLOBIN: CPT

## 2020-03-02 PROCEDURE — 6360000004 HC RX CONTRAST MEDICATION: Performed by: STUDENT IN AN ORGANIZED HEALTH CARE EDUCATION/TRAINING PROGRAM

## 2020-03-02 PROCEDURE — 99284 EMERGENCY DEPT VISIT MOD MDM: CPT

## 2020-03-02 PROCEDURE — 93005 ELECTROCARDIOGRAM TRACING: CPT | Performed by: STUDENT IN AN ORGANIZED HEALTH CARE EDUCATION/TRAINING PROGRAM

## 2020-03-02 PROCEDURE — 80053 COMPREHEN METABOLIC PANEL: CPT

## 2020-03-02 RX ORDER — SODIUM CHLORIDE 9 MG/ML
10 INJECTION INTRAVENOUS EVERY 12 HOURS
Status: DISCONTINUED | OUTPATIENT
Start: 2020-03-02 | End: 2020-03-02

## 2020-03-02 RX ORDER — PANTOPRAZOLE SODIUM 40 MG/10ML
40 INJECTION, POWDER, LYOPHILIZED, FOR SOLUTION INTRAVENOUS ONCE
Status: COMPLETED | OUTPATIENT
Start: 2020-03-02 | End: 2020-03-02

## 2020-03-02 RX ORDER — SODIUM CHLORIDE 9 MG/ML
INJECTION, SOLUTION INTRAVENOUS CONTINUOUS
Status: DISCONTINUED | OUTPATIENT
Start: 2020-03-02 | End: 2020-03-03

## 2020-03-02 RX ORDER — SODIUM CHLORIDE 0.9 % (FLUSH) 0.9 %
10 SYRINGE (ML) INJECTION PRN
Status: DISCONTINUED | OUTPATIENT
Start: 2020-03-02 | End: 2020-03-04 | Stop reason: HOSPADM

## 2020-03-02 RX ORDER — PANTOPRAZOLE SODIUM 40 MG/10ML
40 INJECTION, POWDER, LYOPHILIZED, FOR SOLUTION INTRAVENOUS EVERY 12 HOURS
Status: DISCONTINUED | OUTPATIENT
Start: 2020-03-02 | End: 2020-03-02

## 2020-03-02 RX ORDER — PROMETHAZINE HYDROCHLORIDE 25 MG/1
12.5 TABLET ORAL EVERY 6 HOURS PRN
Status: DISCONTINUED | OUTPATIENT
Start: 2020-03-02 | End: 2020-03-04 | Stop reason: HOSPADM

## 2020-03-02 RX ORDER — HYDRALAZINE HYDROCHLORIDE 20 MG/ML
5 INJECTION INTRAMUSCULAR; INTRAVENOUS EVERY 6 HOURS PRN
Status: DISCONTINUED | OUTPATIENT
Start: 2020-03-02 | End: 2020-03-04 | Stop reason: HOSPADM

## 2020-03-02 RX ORDER — ONDANSETRON 2 MG/ML
4 INJECTION INTRAMUSCULAR; INTRAVENOUS EVERY 6 HOURS PRN
Status: DISCONTINUED | OUTPATIENT
Start: 2020-03-02 | End: 2020-03-04 | Stop reason: HOSPADM

## 2020-03-02 RX ORDER — ACETAMINOPHEN 325 MG/1
650 TABLET ORAL EVERY 4 HOURS PRN
Status: DISCONTINUED | OUTPATIENT
Start: 2020-03-02 | End: 2020-03-04 | Stop reason: HOSPADM

## 2020-03-02 RX ORDER — SODIUM CHLORIDE 0.9 % (FLUSH) 0.9 %
10 SYRINGE (ML) INJECTION EVERY 12 HOURS SCHEDULED
Status: DISCONTINUED | OUTPATIENT
Start: 2020-03-02 | End: 2020-03-04 | Stop reason: HOSPADM

## 2020-03-02 RX ADMIN — HYDROMORPHONE HYDROCHLORIDE 0.5 MG: 1 INJECTION, SOLUTION INTRAMUSCULAR; INTRAVENOUS; SUBCUTANEOUS at 18:03

## 2020-03-02 RX ADMIN — PANTOPRAZOLE SODIUM 40 MG: 40 INJECTION, POWDER, FOR SOLUTION INTRAVENOUS at 18:05

## 2020-03-02 RX ADMIN — SODIUM CHLORIDE: 9 INJECTION, SOLUTION INTRAVENOUS at 18:03

## 2020-03-02 RX ADMIN — Medication 10 ML: at 18:03

## 2020-03-02 RX ADMIN — PANTOPRAZOLE SODIUM 40 MG: 40 INJECTION, POWDER, FOR SOLUTION INTRAVENOUS at 11:07

## 2020-03-02 RX ADMIN — IOPAMIDOL 80 ML: 755 INJECTION, SOLUTION INTRAVENOUS at 12:21

## 2020-03-02 RX ADMIN — SODIUM CHLORIDE 8 MG/HR: 9 INJECTION, SOLUTION INTRAVENOUS at 21:21

## 2020-03-02 RX ADMIN — LIDOCAINE HYDROCHLORIDE: 20 SOLUTION ORAL; TOPICAL at 11:07

## 2020-03-02 ASSESSMENT — ENCOUNTER SYMPTOMS
EYE PAIN: 0
EYE REDNESS: 0
CHEST TIGHTNESS: 0
WHEEZING: 0
VOMITING: 1
SHORTNESS OF BREATH: 0
DIARRHEA: 0
DIARRHEA: 1
ABDOMINAL DISTENTION: 0
NAUSEA: 1
PHOTOPHOBIA: 0
ABDOMINAL PAIN: 1
RECTAL PAIN: 0
VOMITING: 0
COUGH: 0
CHOKING: 0
COLOR CHANGE: 0
BLOOD IN STOOL: 1
ANAL BLEEDING: 0
ABDOMINAL DISTENTION: 1
RHINORRHEA: 0
CONSTIPATION: 0
SORE THROAT: 0

## 2020-03-02 ASSESSMENT — PAIN DESCRIPTION - PAIN TYPE
TYPE: ACUTE PAIN
TYPE: ACUTE PAIN

## 2020-03-02 ASSESSMENT — PAIN SCALES - GENERAL
PAINLEVEL_OUTOF10: 7
PAINLEVEL_OUTOF10: 8
PAINLEVEL_OUTOF10: 6
PAINLEVEL_OUTOF10: 7

## 2020-03-02 ASSESSMENT — PAIN DESCRIPTION - ONSET: ONSET: ON-GOING

## 2020-03-02 ASSESSMENT — PAIN DESCRIPTION - LOCATION
LOCATION: ABDOMEN
LOCATION: ABDOMEN

## 2020-03-02 ASSESSMENT — PAIN DESCRIPTION - PROGRESSION: CLINICAL_PROGRESSION: GRADUALLY WORSENING

## 2020-03-02 NOTE — H&P
better. He states the pain is a sharp, stabbing pain that comes and goes. He states the pain does not radiate, and he rates it 8/10. He states he has never had a similar experience before. He states that it has been a few days since his last black, tarry stool, but now his stools are more loose like diarrhea. Today he also admits to increased flatus. He denies BRBPR, abdominal distension, vomiting. In the ED the patient was given IV protonix as well as a GI cocktail. A CT abdomen pelvis with contrast was obtained. The patient denied a rectal exam, so FOBT was obtained from a stool sample. Past Medical History:        Diagnosis Date    Bipolar 2 disorder [facility])     previously followed with Dr. Letty Jeffrey and Kennedy Ford in [facility] Diabetes mellitus type 2, uncontrolled ([address]     HgbA1c on 4/2/2019 was 9.1.     Diabetic polyneuropathy ([address]     Diabetic ulcer of right foot associated with type 2 diabetes mellitus ([address] 12/10/2015    Essential hypertension     \"never been on b/p medication that I know of\"    GERD (gastroesophageal reflux disease)     Hammer toe of left foot     Heart murmur     denies any chest pain or palpitations    History of tobacco abuse     Hx of AKA (above knee amputation), right ([address] 04/18/2019    Dr. Karine Vera edema, diabetic, bilateral ([address] 05/04/2018    Dr. Yunior Triplett referred to retina specialist for 2nd opinion    Marijuana abuse in remission     Onychomycosis     Other disorders of kidney and ureter in diseases classified elsewhere     WD-Skin ulcer of fourth toe of right foot with necrosis of bone ([address] 6/29/2016       Past Surgical History:        Procedure Laterality Date    ABSCESS DRAINAGE Right     foot    AMPUTATION ABOVE KNEE Right 4/18/2019    RIGHT ABOVE KNEE AMPUTATION performed by Villa Carballo MD at [facility] Right 07/01/2016    I & D    INCISION AND DRAINAGE Right 2/18/2019    I&D RIGHT STUMP performed by DX: E11.65 400 strip 5    Insulin Syringe-Needle U-100 29G X 1/2\" 0.3 ML MISC 1 each by Does not apply route 4 times daily (after meals and at bedtime) 200 each 0       Allergies:    Pcn [penicillins] and Clindamycin/lincomycin    Social History:    reports that he quit smoking about 35 years ago. His smoking use included cigars. He has a 65.00 pack-year smoking history. He has never used smokeless tobacco. He reports previous alcohol use. He reports that he does not use drugs. Family History:       Problem Relation Age of Onset    Diabetes Mother     Other Mother         pneumonia, H1N1    Depression Mother     Early Death Mother     High Blood Pressure Mother     High Cholesterol Mother     Vision Loss Maternal Grandmother     Arthritis Maternal Grandfather     Heart Disease Maternal Grandfather        Diet:  No diet orders on file    Review of systems:   Review of Systems   Constitutional: Positive for chills. Negative for activity change, appetite change and fever. HENT: Negative for congestion, ear pain, hearing loss, rhinorrhea and sore throat. Eyes: Negative for photophobia, pain and redness. Respiratory: Negative for cough, choking and shortness of breath. Cardiovascular: Negative for chest pain, palpitations and leg swelling. Gastrointestinal: Positive for abdominal pain, blood in stool and diarrhea. Negative for abdominal distention, anal bleeding, constipation and vomiting. Genitourinary: Negative for difficulty urinating, dysuria, frequency and urgency. Musculoskeletal: Positive for gait problem (patient hx of right BKA. ). Negative for arthralgias and myalgias. Skin: Negative for color change, rash and wound. Neurological: Negative for dizziness, syncope and headaches. Psychiatric/Behavioral: Negative for confusion and hallucinations. The patient is not nervous/anxious.             PHYSICAL EXAM:  /67   Pulse 100   Temp 98 °F (36.7 °C) (Oral)   Resp 18   Wt 228 lb (103.4 kg)   SpO2 95%   BMI 40.39 kg/m²   General appearance: Obese. No apparent distress, appears stated age and cooperative. HEENT: Poor dentition. Normal cephalic, atraumatic without obvious deformity. Pupils equal, round, and reactive to light. Extra ocular muscles intact. Conjunctivae/corneas clear. Neck: Supple, with full range of motion. No jugular venous distention. Trachea midline. Respiratory:  Normal respiratory effort. Clear to auscultation, bilaterally without Rales/Wheezes/Rhonchi. Cardiovascular: Regular rate and rhythm with normal S1/S2 without murmurs, rubs or gallops. Abdomen: diffuse tenderness to palpation. Soft, non-distended with normal bowel sounds. Musculoskeletal:  RLE BKA. No clubbing, cyanosis or edema bilaterally. Skin: Surgical scars present. Skin color, texture, turgor normal.  No rashes or lesions. Neurologic:  Neurovascularly intact without any focal sensory/motor deficits. Cranial nerves: II-XII intact, grossly non-focal.  Psychiatric: Alert and oriented, thought content appropriate, normal insight  Capillary Refill: Brisk,< 3 seconds   Peripheral Pulses: +2 palpable, equal bilaterally     Labs:   Recent Labs     03/02/20  1101   WBC 13.6*   HGB 15.7   HCT 46.5        Recent Labs     03/02/20  1101   *   K 4.5   CL 96*   CO2 20*   BUN 13   CREATININE 0.8   CALCIUM 9.3     Recent Labs     03/02/20  1101   AST 24   ALT 18   BILITOT 0.4   ALKPHOS 76     Urinalysis:    Lab Results   Component Value Date    NITRU NEGATIVE 03/02/2020    WBCUA 0-2 03/02/2020    BACTERIA NONE SEEN 03/02/2020    RBCUA 0-2 03/02/2020    BLOODU NEGATIVE 03/02/2020    SPECGRAV >1.030 06/06/2019    GLUCOSEU >= 1000 03/02/2020       Radiology:   CT ABDOMEN PELVIS W IV CONTRAST Additional Contrast? None   Final Result   Partially decompressed bladder. Wall thickening which may be artifactual. No significant perivesicular infiltration. Correlate with urinalysis is advised.    Moderate scattered stool throughout the colon. Correlate for constipation. No bowel wall thickening or obstruction. **This report has been created using voice recognition software. It may contain minor errors which are inherent in voice recognition technology. **      Final report electronically signed by Dr. Dee Carballo on 3/2/2020 12:49 PM          Electronically signed by Corinne Campbell on 3/2/2020 at 3:04 PM   Electronically signed by CINDY Park on 3/2/2020 at 3:46 PM

## 2020-03-02 NOTE — ED PROVIDER NOTES
[facility]  eMERGENCY dEPARTMENT eNCOUnter          CHIEF COMPLAINT       Chief Complaint   Patient presents with    Bloated     x2 weeks       Nurses Notes reviewed and I agree except as noted in the HPI. HISTORY OF PRESENT ILLNESS    Paula Hagen is a 44 y.o. male who presents to the Emergency Department for the evaluation of abdominal pain. He has been feeling bloated for the past 2 weeks. Aggravating factors include any food. He has had nausea, fever, chills, feeling lightheaded and increased gas with generalized abdominal pain . He has noticed black stools since Thursday, multiple times a day with decreasing frequency. He had red in his stool today. REVIEW OF SYSTEMS     Review of Systems   Constitutional: Positive for appetite change, chills, fatigue and fever. Negative for activity change, diaphoresis and unexpected weight change. Respiratory: Negative for cough, chest tightness, shortness of breath and wheezing. Cardiovascular: Negative for chest pain, palpitations and leg swelling. Gastrointestinal: Positive for abdominal distention, abdominal pain, blood in stool, nausea and vomiting. Negative for constipation, diarrhea and rectal pain. Genitourinary: Negative for decreased urine volume, difficulty urinating, dysuria, flank pain, genital sores, hematuria and urgency. Skin: Negative for color change, pallor, rash and wound. Neurological: Positive for light-headedness. Negative for dizziness, weakness and headaches.         PAST MEDICAL HISTORY    has a past medical history of Bipolar 2 disorder ([address] Diabetes mellitus type 2, uncontrolled ([address] Diabetic polyneuropathy ([facility] [address] Diabetic ulcer of right foot associated with type 2 diabetes mellitus ([facility] [address] Essential hypertension, GERD (gastroesophageal reflux disease), Hammer toe of left foot, Heart murmur, History of tobacco abuse, Hx of AKA (above knee amputation), right ([address] Macular edema, diabetic, bilateral 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 SURGICAL HISTORY      has a past surgical history that includes other surgical history (Right, 1/14/15); Abscess Drainage (Right); Toe amputation (Right, 1/16/15); Foot Debridement (Right, 07/01/2016); Leg amputation below knee (Right, 07/20/2016); [place] tooth extraction (?when); pr drain infect shoulder bursa (Left, 8/18/2017); pr office/outpt visit,procedure only (Right, 9/20/2018); incision and drainage (Right, 2/18/2019); Leg amputation below knee (Right, 4/4/2019); and AMPUTATION ABOVE KNEE (Right, 4/18/2019).     CURRENT MEDICATIONS       Current Discharge Medication List      CONTINUE these medications which have NOT CHANGED    Details   linagliptin (TRADJENTA) 5 MG tablet Take 1 tablet by mouth daily  Qty: 30 tablet, Refills: 5    Associated Diagnoses: Uncontrolled type 2 diabetes mellitus with hyperglycemia, with long-term current use of insulin ([facility])      amLODIPine (NORVASC) 2.5 MG tablet Take 1 tablet by mouth daily  Qty: 30 tablet, Refills: 3    Associated Diagnoses: Essential hypertension      pantoprazole (PROTONIX) 40 MG tablet Take 1 tablet by mouth 2 times daily  Qty: 30 tablet, Refills: 3    Associated Diagnoses: Gastroesophageal reflux disease without esophagitis      sertraline (ZOLOFT) 50 MG tablet Take 1 tablet by mouth daily  Qty: 30 tablet, Refills: 5    Associated Diagnoses: Severe episode of recurrent major depressive disorder, without psychotic features ([facility])      insulin aspart (NOVOLOG FLEXPEN) 100 UNIT/ML injection pen Inject 20 Units into the skin 3 times daily (before meals)  Qty: 5 pen, Refills: 3    Associated Diagnoses: Uncontrolled type 2 diabetes mellitus with hyperglycemia, with long-term current use of insulin ([facility])      insulin detemir (LEVEMIR FLEXTOUCH) 100 images(s) such as CT, Ultrasound and MRI are readby theradiologist.    CT ABDOMEN PELVIS W IV CONTRAST Additional Contrast? None   Final Result   Partially decompressed bladder. Wall thickening which may be artifactual. No significant perivesicular infiltration. Correlate with urinalysis is advised. Moderate scattered stool throughout the colon. Correlate for constipation. No bowel wall thickening or obstruction. **This report has been created using voice recognition software. It may contain minor errors which are inherent in voice recognition technology. **      Final report electronically signed by Dr. Yao Estes on 3/2/2020 12:49 PM            LABS:   Labs Reviewed   COMPREHENSIVE METABOLIC PANEL - Abnormal; Notable for the following components:       Result Value    Glucose 260 (*)     Sodium 131 (*)     Chloride 96 (*)     CO2 20 (*)     Total Protein 8.6 (*)     All other components within normal limits   CBC WITH AUTO DIFFERENTIAL - Abnormal; Notable for the following components:    WBC 13.6 (*)     Segs Absolute 8.7 (*)     Eosinophils Absolute 0.7 (*)     Immature Grans (Abs) 0.11 (*)     All other components within normal limits   LACTIC ACID, PLASMA - Abnormal; Notable for the following components:    Lactic Acid 3.8 (*)     All other components within normal limits   URINE WITH REFLEXED MICRO - Abnormal; Notable for the following components:    Glucose, Ur >= 1000 (*)     Ketones, Urine TRACE (*)     Specific Gravity, Urine > 1.030 (*)     Protein, UA 30 (*)     All other components within normal limits   OSMOLALITY - Abnormal; Notable for the following components:    Osmolality Calc 271.7 (*)     All other components within normal limits   TROPONIN   LIPASE   BLOOD OCCULT STOOL SCREEN #1   ANION GAP   GLOMERULAR FILTRATION RATE, ESTIMATED   HEMOGLOBIN   LACTIC ACID, PLASMA   HEMOGLOBIN   BASIC METABOLIC PANEL W/ REFLEX TO MG FOR LOW K   CBC   HEMOGLOBIN A1C   HEMOGLOBIN   TYPE AND SCREEN EMERGENCYDEPARTMENTCOURSE:   Vitals:    Vitals:    03/02/20 1326 03/02/20 1525 03/02/20 1727 03/02/20 2004   BP: 129/67 128/73 (!) 141/69 121/82   Pulse: 100 82 91 89   Resp: 18 18 18 18   Temp:   98.7 °F (37.1 °C) 98.1 °F (36.7 °C)   TempSrc:   Oral Oral   SpO2: 95% 97% 92% 95%   Weight:         Patient was seen history physical exam was performed. See disposition below    MDM:  The patient's examination shows diffuse abdominal tenderness with guarding and bloating. He has a positive lactic acid level concerning for GI bleed but normal findings for remainder of labs. His CT does not show any diffuse thickening or signs of diverticulitis or specific significant abnormal findings. He states he feels very uncomfortable and medications I gave him did help but he is concerned about going home. I discussed with the patient that there is a possibility that he does have a GI bleed somewhere and that we can try outpatient with follow-up tomorrow with the GI specialist for upper and lower scopes but he prefers to stay in the hospital for urgent follow up and he is graciously admitted to the hospitalist service by Dr. Bakari Sanf:   None    CONSULTS:  Dr. Shad Dewitt, hospitalist    PROCEDURES:  None    FINAL IMPRESSION    GI bleed     DISPOSITION/PLAN   admit    PATIENT REFERREDTO:  Ruby July, APRN - [facility]  [address] [place] [address]            DISCHARGE MEDICATIONS:  Current Discharge Medication List          (Please note that portions of this note were completed with a voice recognition program.  Efforts were made to edit the dictations but occasionally words are mis-transcribed.)    The patient was given an opportunity to see the Emergency Attending. The patient voiced understanding that I was a Mid-Level Provider and was in agreement with being seen independently by myself.        Jania Hassan PA-C 3/2/20 9:49 PM    Trino Buckley PA-C Lalito Jones PA-C  03/02/20 [id number]

## 2020-03-02 NOTE — ED NOTES
ED nurse-to-nurse bedside report    Chief Complaint   Patient presents with    Bloated     x2 weeks      LOC: alert and orientated to name, place, date     Vital signs   Vitals:    03/02/20 1101 03/02/20 1112 03/02/20 1326 03/02/20 1525   BP:  135/79 129/67 128/73   Pulse:  93 100 82   Resp:  18 18 18   Temp:       TempSrc:       SpO2:  97% 95% 97%   Weight: 228 lb (103.4 kg)         Pain:  7/10  Pain Interventions: IV protonix, GI cocktail   Pain Goal: 4/10  Oxygen: No    Current needs required- Pain medications    Telemetry: No  LDAs:   Peripheral IV 03/02/20 Right Forearm (Active)   Site Assessment Clean;Dry; Intact 3/2/2020 11:01 AM   Line Status Blood return noted;Brisk blood return;Flushed;Capped;Normal saline locked;Specimen collected 3/2/2020 11:01 AM   Dressing Status Clean;Dry; Intact 3/2/2020 11:01 AM   Dressing Intervention New 3/2/2020 11:01 AM     Continuous Infusions:    sodium chloride       Mobility: Independent, Wheelchair   [place] Fall Risk Score:    Fall Risk 6/4/2019   2 or more falls in past year? no   Fall with injury in past year? no     Fall Interventions: N/A  Report given to: Clari Wu RN  03/02/20 0572

## 2020-03-02 NOTE — ED NOTES
[facility], [place] in room to discuss results with patient at this time     Marcelo Jones RN  03/02/20 [id number]

## 2020-03-03 ENCOUNTER — HOSPITAL ENCOUNTER (OUTPATIENT)
Dept: PHYSICAL THERAPY | Age: 50
Setting detail: THERAPIES SERIES
End: 2020-03-03
Payer: MEDICARE

## 2020-03-03 ENCOUNTER — ANESTHESIA (OUTPATIENT)
Dept: ENDOSCOPY | Age: 50
DRG: 378 | End: 2020-03-03
Payer: MEDICARE

## 2020-03-03 ENCOUNTER — ANESTHESIA EVENT (OUTPATIENT)
Dept: ENDOSCOPY | Age: 50
DRG: 378 | End: 2020-03-03
Payer: MEDICARE

## 2020-03-03 ENCOUNTER — APPOINTMENT (OUTPATIENT)
Dept: PHYSICAL THERAPY | Age: 50
End: 2020-03-03
Payer: MEDICARE

## 2020-03-03 VITALS
RESPIRATION RATE: 17 BRPM | DIASTOLIC BLOOD PRESSURE: 90 MMHG | SYSTOLIC BLOOD PRESSURE: 152 MMHG | OXYGEN SATURATION: 100 %

## 2020-03-03 LAB
ANION GAP SERPL CALCULATED.3IONS-SCNC: 13 MEQ/L (ref 8–16)
AVERAGE GLUCOSE: 189 MG/DL (ref 70–126)
BUN BLDV-MCNC: 14 MG/DL (ref 7–22)
CALCIUM SERPL-MCNC: 8.6 MG/DL (ref 8.5–10.5)
CHLORIDE BLD-SCNC: 103 MEQ/L (ref 98–111)
CO2: 23 MEQ/L (ref 23–33)
CREAT SERPL-MCNC: 0.8 MG/DL (ref 0.4–1.2)
EKG ATRIAL RATE: 77 BPM
EKG P AXIS: 51 DEGREES
EKG P-R INTERVAL: 180 MS
EKG Q-T INTERVAL: 402 MS
EKG QRS DURATION: 82 MS
EKG QTC CALCULATION (BAZETT): 454 MS
EKG R AXIS: 25 DEGREES
EKG T AXIS: 53 DEGREES
EKG VENTRICULAR RATE: 77 BPM
ERYTHROCYTE [DISTWIDTH] IN BLOOD BY AUTOMATED COUNT: 14 % (ref 11.5–14.5)
ERYTHROCYTE [DISTWIDTH] IN BLOOD BY AUTOMATED COUNT: 44.3 FL (ref 35–45)
GFR SERPL CREATININE-BSD FRML MDRD: > 90 ML/MIN/1.73M2
GLUCOSE BLD-MCNC: 167 MG/DL (ref 70–108)
GLUCOSE BLD-MCNC: 174 MG/DL (ref 70–108)
GLUCOSE BLD-MCNC: 190 MG/DL (ref 70–108)
GLUCOSE BLD-MCNC: 295 MG/DL (ref 70–108)
HBA1C MFR BLD: 8.3 % (ref 4.4–6.4)
HCT VFR BLD CALC: 42.1 % (ref 42–52)
HEMOGLOBIN: 14 GM/DL (ref 14–18)
HEMOGLOBIN: 14.2 GM/DL (ref 14–18)
HEMOGLOBIN: 14.5 GM/DL (ref 14–18)
MCH RBC QN AUTO: 29.2 PG (ref 26–33)
MCHC RBC AUTO-ENTMCNC: 33.3 GM/DL (ref 32.2–35.5)
MCV RBC AUTO: 87.7 FL (ref 80–94)
PLATELET # BLD: 233 THOU/MM3 (ref 130–400)
PMV BLD AUTO: 9.3 FL (ref 9.4–12.4)
POTASSIUM REFLEX MAGNESIUM: 3.8 MEQ/L (ref 3.5–5.2)
RBC # BLD: 4.8 MILL/MM3 (ref 4.7–6.1)
SODIUM BLD-SCNC: 139 MEQ/L (ref 135–145)
WBC # BLD: 12.1 THOU/MM3 (ref 4.8–10.8)

## 2020-03-03 PROCEDURE — 2500000003 HC RX 250 WO HCPCS: Performed by: NURSE ANESTHETIST, CERTIFIED REGISTERED

## 2020-03-03 PROCEDURE — 2580000003 HC RX 258: Performed by: PHYSICIAN ASSISTANT

## 2020-03-03 PROCEDURE — 1200000003 HC TELEMETRY R&B

## 2020-03-03 PROCEDURE — 2580000003 HC RX 258: Performed by: INTERNAL MEDICINE

## 2020-03-03 PROCEDURE — 99232 SBSQ HOSP IP/OBS MODERATE 35: CPT | Performed by: FAMILY MEDICINE

## 2020-03-03 PROCEDURE — 3609012700 HC EGD DILATION SAVORY: Performed by: INTERNAL MEDICINE

## 2020-03-03 PROCEDURE — 7100000001 HC PACU RECOVERY - ADDTL 15 MIN: Performed by: INTERNAL MEDICINE

## 2020-03-03 PROCEDURE — 85027 COMPLETE CBC AUTOMATED: CPT

## 2020-03-03 PROCEDURE — 80048 BASIC METABOLIC PNL TOTAL CA: CPT

## 2020-03-03 PROCEDURE — 3700000000 HC ANESTHESIA ATTENDED CARE: Performed by: INTERNAL MEDICINE

## 2020-03-03 PROCEDURE — 36415 COLL VENOUS BLD VENIPUNCTURE: CPT

## 2020-03-03 PROCEDURE — 6360000002 HC RX W HCPCS: Performed by: NURSE ANESTHETIST, CERTIFIED REGISTERED

## 2020-03-03 PROCEDURE — 93005 ELECTROCARDIOGRAM TRACING: CPT | Performed by: PHYSICIAN ASSISTANT

## 2020-03-03 PROCEDURE — 82948 REAGENT STRIP/BLOOD GLUCOSE: CPT

## 2020-03-03 PROCEDURE — 0D778ZZ DILATION OF STOMACH, PYLORUS, VIA NATURAL OR ARTIFICIAL OPENING ENDOSCOPIC: ICD-10-PCS | Performed by: INTERNAL MEDICINE

## 2020-03-03 PROCEDURE — 6370000000 HC RX 637 (ALT 250 FOR IP): Performed by: FAMILY MEDICINE

## 2020-03-03 PROCEDURE — 6370000000 HC RX 637 (ALT 250 FOR IP): Performed by: INTERNAL MEDICINE

## 2020-03-03 PROCEDURE — 85018 HEMOGLOBIN: CPT

## 2020-03-03 PROCEDURE — 3700000001 HC ADD 15 MINUTES (ANESTHESIA): Performed by: INTERNAL MEDICINE

## 2020-03-03 PROCEDURE — 83036 HEMOGLOBIN GLYCOSYLATED A1C: CPT

## 2020-03-03 PROCEDURE — 6360000002 HC RX W HCPCS: Performed by: INTERNAL MEDICINE

## 2020-03-03 PROCEDURE — 93010 ELECTROCARDIOGRAM REPORT: CPT | Performed by: NUCLEAR MEDICINE

## 2020-03-03 PROCEDURE — 6360000002 HC RX W HCPCS: Performed by: PHYSICIAN ASSISTANT

## 2020-03-03 PROCEDURE — 7100000000 HC PACU RECOVERY - FIRST 15 MIN: Performed by: INTERNAL MEDICINE

## 2020-03-03 PROCEDURE — C9113 INJ PANTOPRAZOLE SODIUM, VIA: HCPCS | Performed by: INTERNAL MEDICINE

## 2020-03-03 PROCEDURE — 0DJ08ZZ INSPECTION OF UPPER INTESTINAL TRACT, VIA NATURAL OR ARTIFICIAL OPENING ENDOSCOPIC: ICD-10-PCS | Performed by: INTERNAL MEDICINE

## 2020-03-03 RX ORDER — BISACODYL 10 MG
10 SUPPOSITORY, RECTAL RECTAL DAILY PRN
Status: DISCONTINUED | OUTPATIENT
Start: 2020-03-03 | End: 2020-03-04 | Stop reason: HOSPADM

## 2020-03-03 RX ORDER — PROPOFOL 10 MG/ML
INJECTION, EMULSION INTRAVENOUS PRN
Status: DISCONTINUED | OUTPATIENT
Start: 2020-03-03 | End: 2020-03-03 | Stop reason: SDUPTHER

## 2020-03-03 RX ORDER — PANTOPRAZOLE SODIUM 40 MG/1
40 TABLET, DELAYED RELEASE ORAL
Status: DISCONTINUED | OUTPATIENT
Start: 2020-03-03 | End: 2020-03-04 | Stop reason: HOSPADM

## 2020-03-03 RX ORDER — POLYETHYLENE GLYCOL 3350 17 G/17G
17 POWDER, FOR SOLUTION ORAL DAILY PRN
Status: DISCONTINUED | OUTPATIENT
Start: 2020-03-03 | End: 2020-03-04 | Stop reason: HOSPADM

## 2020-03-03 RX ORDER — POLYETHYLENE GLYCOL 3350 17 G/17G
17 POWDER, FOR SOLUTION ORAL DAILY PRN
Qty: 527 G | Refills: 0 | Status: SHIPPED | OUTPATIENT
Start: 2020-03-03 | End: 2020-04-03

## 2020-03-03 RX ORDER — SUCRALFATE 1 G/1
1 TABLET ORAL
Status: DISCONTINUED | OUTPATIENT
Start: 2020-03-03 | End: 2020-03-04 | Stop reason: HOSPADM

## 2020-03-03 RX ORDER — DOCUSATE SODIUM 100 MG/1
100 CAPSULE, LIQUID FILLED ORAL DAILY
Status: DISCONTINUED | OUTPATIENT
Start: 2020-03-03 | End: 2020-03-04 | Stop reason: HOSPADM

## 2020-03-03 RX ORDER — LIDOCAINE HYDROCHLORIDE 20 MG/ML
INJECTION, SOLUTION INFILTRATION; PERINEURAL PRN
Status: DISCONTINUED | OUTPATIENT
Start: 2020-03-03 | End: 2020-03-03 | Stop reason: SDUPTHER

## 2020-03-03 RX ORDER — PANTOPRAZOLE SODIUM 40 MG/1
40 TABLET, DELAYED RELEASE ORAL
Qty: 60 TABLET | Refills: 0 | Status: SHIPPED | OUTPATIENT
Start: 2020-03-03 | End: 2020-06-18

## 2020-03-03 RX ORDER — SUCRALFATE 1 G/1
1 TABLET ORAL
Qty: 120 TABLET | Refills: 0 | Status: SHIPPED | OUTPATIENT
Start: 2020-03-03 | End: 2020-06-18

## 2020-03-03 RX ADMIN — Medication 10 ML: at 20:03

## 2020-03-03 RX ADMIN — SUCRALFATE 1 G: 1 TABLET ORAL at 20:03

## 2020-03-03 RX ADMIN — LIDOCAINE HYDROCHLORIDE 100 MG: 20 INJECTION, SOLUTION INFILTRATION; PERINEURAL at 11:18

## 2020-03-03 RX ADMIN — PANTOPRAZOLE SODIUM 40 MG: 40 TABLET, DELAYED RELEASE ORAL at 18:03

## 2020-03-03 RX ADMIN — HYDROMORPHONE HYDROCHLORIDE 0.5 MG: 1 INJECTION, SOLUTION INTRAMUSCULAR; INTRAVENOUS; SUBCUTANEOUS at 10:12

## 2020-03-03 RX ADMIN — SODIUM CHLORIDE 8 MG/HR: 9 INJECTION, SOLUTION INTRAVENOUS at 07:33

## 2020-03-03 RX ADMIN — POLYETHYLENE GLYCOL 3350 17 G: 17 POWDER, FOR SOLUTION ORAL at 17:54

## 2020-03-03 RX ADMIN — PROPOFOL 200 MG: 10 INJECTION, EMULSION INTRAVENOUS at 11:20

## 2020-03-03 ASSESSMENT — PAIN SCALES - GENERAL
PAINLEVEL_OUTOF10: 0
PAINLEVEL_OUTOF10: 7
PAINLEVEL_OUTOF10: 0
PAINLEVEL_OUTOF10: 7
PAINLEVEL_OUTOF10: 0
PAINLEVEL_OUTOF10: 7

## 2020-03-03 ASSESSMENT — PAIN DESCRIPTION - ONSET: ONSET: ON-GOING

## 2020-03-03 ASSESSMENT — PAIN - FUNCTIONAL ASSESSMENT: PAIN_FUNCTIONAL_ASSESSMENT: 0-10

## 2020-03-03 ASSESSMENT — PAIN DESCRIPTION - LOCATION: LOCATION: ABDOMEN

## 2020-03-03 ASSESSMENT — PAIN DESCRIPTION - ORIENTATION: ORIENTATION: MID

## 2020-03-03 ASSESSMENT — PAIN DESCRIPTION - DESCRIPTORS: DESCRIPTORS: SHARP;CRAMPING

## 2020-03-03 ASSESSMENT — PAIN DESCRIPTION - PAIN TYPE: TYPE: ACUTE PAIN

## 2020-03-03 ASSESSMENT — PAIN DESCRIPTION - FREQUENCY: FREQUENCY: CONTINUOUS

## 2020-03-03 NOTE — PLAN OF CARE
Problem: Falls - Risk of:  Goal: Will remain free from falls  Description  Will remain free from falls  Outcome: Ongoing  Note:   Patient remains free from falls this shift. Fall sign posted. Bed alarm on. Bed in lowest position. 2/4 side rails up. Nonskid sock on left foot, right lower extremity amputated in 2016. Call light and all possessions within reach. Hourly rounding. Transfers to wheelchair standby assist.        Problem: Discharge Planning:  Goal: Discharged to appropriate level of care  Description  Discharged to appropriate level of care  Outcome: Ongoing  Note:   From home with friends. Plans to return home at discharge. Problem: Pain:  Goal: Control of acute pain  Description  Control of acute pain  Outcome: Ongoing  Note:   Pain goal 4/10. Report of 7/10 pain in abdomen early this shift. Rest and repositioning. Refused pain medication. PRN dilaudid available every three hours. Problem: Nutrition Deficit:  Goal: Ability to achieve adequate nutritional intake will improve  Description  Ability to achieve adequate nutritional intake will improve  Outcome: Ongoing  Note:   Patient NPO at this time. 0.9 NaCL at 125. Will continue to monitor. Problem: Metabolic:  Goal: Ability to maintain appropriate glucose levels will improve  Description  Ability to maintain appropriate glucose levels will improve  Outcome: Ongoing  Note:   Chem q6h while NPO. No orders for insulin at this time. Problem: Activity:  Goal: Risk for activity intolerance will decrease  Description  Risk for activity intolerance will decrease  Note:   Patient RLE amputated in 2016. Transfers from wheelchair to chair and bed standby assist.     Care plan reviewed with patient. Patient verbalizes understanding of the plan of care and contributes to goal setting.

## 2020-03-03 NOTE — ANESTHESIA PRE PROCEDURE
Department of Anesthesiology  Preprocedure Note       Name:  Paula Hagen   Age:  44 y.o.  :  1970                                          MRN:  [id number]         Date:  3/3/2020      Surgeon: Connie Vazquez):  Simi Bullard MD    Procedure: EGD ESOPHAGOGASTRODUODENOSCOPY (N/A )    Medications prior to admission:   Prior to Admission medications    Medication Sig Start Date End Date Taking? Authorizing Provider   linagliptin (TRADJENTA) 5 MG tablet Take 1 tablet by mouth daily 20  Yes MARILYN Seals CNP   amLODIPine (NORVASC) 2.5 MG tablet Take 1 tablet by mouth daily 20  Yes MARILYN Seals CNP   pantoprazole (PROTONIX) 40 MG tablet Take 1 tablet by mouth 2 times daily 20  Yes MARILYN Seals CNP   sertraline (ZOLOFT) 50 MG tablet Take 1 tablet by mouth daily 20  Yes MARILYN Seals CNP   insulin aspart (NOVOLOG FLEXPEN) 100 UNIT/ML injection pen Inject 20 Units into the skin 3 times daily (before meals) 20  Yes MARILYN Seals CNP   insulin detemir (LEVEMIR FLEXTOUCH) 100 UNIT/ML injection pen Inject 80 Units into the skin 2 times daily 20  Yes MARILYN Seals CNP   pioglitazone (ACTOS) 30 MG tablet Take 1 tablet by mouth daily 20  Yes MARILYN Seals CNP   gabapentin (NEURONTIN) 300 MG capsule TAKE 1 CAPSULE BY MOUTH 3 TIMES A DAY 19 Yes Debe Sensor [place]fleetMARILYN CNP   AMINO ACIDS COMPLEX PO Take 1 each by mouth daily Daily AA drink   Yes Historical Provider, MD   Lancets MISC Use to test blood sugars 4 times daily DX:E11.65 20   MARILYN Seals CNP   blood glucose monitor kit and supplies Accu Chek Sheila meter.  Use to test blood sugars DX:E11.65 10/8/19   MARILYN Seals CNP   blood glucose monitor strips Accucheck Sheila Test Strips Test blood sugars 4 times daily DX:E11.65 10/8/19   Vallorie Lank, APRN - CNP   Insulin Syringe-Needle U-100 29G X 1/2\" 0.3 ML MISC 1 each by Does not apply route 4 times daily (after meals and at bedtime) 4/30/19   Lior Hernandez MD       Current medications:    Current Facility-Administered Medications   Medication Dose Route Frequency Provider Last Rate Last Dose    sucralfate (CARAFATE) tablet 1 g  1 g Oral 4x Daily AC & HS Carmela West MD        polyethylene glycol (GLYCOLAX) packet 17 g  17 g Oral Daily PRN Carmela West MD        docusate sodium (COLACE) capsule 100 mg  100 mg Oral Daily Carmela West MD        bisacodyl (DULCOLAX) suppository 10 mg  10 mg Rectal Daily PRN Carmela West MD        sodium chloride flush 0.9 % injection 10 mL  10 mL Intravenous 2 times per day [employer], [address]        sodium chloride flush 0.9 % injection 10 mL  10 mL Intravenous PRN [employer], [place]        acetaminophen (TYLENOL) tablet 650 mg  650 mg Oral Q4H PRN CINDY Sigala        promethazine (PHENERGAN) tablet 12.5 mg  12.5 mg Oral Q6H PRN CINDY Sigala        Or    ondansetron (ZOFRAN) injection 4 mg  4 mg Intravenous Q6H PRN CINDY Sigala        0.9 % sodium chloride infusion   Intravenous Continuous CINDY Sigala 150 mL/hr at 03/02/20 1803      HYDROmorphone (DILAUDID) injection 0.25 mg  0.25 mg Intravenous Q3H PRN CINDY Sigala        Or    HYDROmorphone (DILAUDID) injection 0.5 mg  0.5 mg Intravenous Q3H PRN CINDY Sigala   0.5 mg at 03/03/20 1012    hydrALAZINE (APRESOLINE) injection 5 mg  5 mg Intravenous Q6H PRN [employer], [employer]        calcium replacement protocol   Other RX Placeholder Jonny Harper        magnesium replacement protocol   Other RX Placeholder Jonny Harper        potassium replacement protocol   Other RX Placeholder Jonny Harper        pantoprazole (PROTONIX) 80 mg in sodium chloride 0.9 % 100 mL infusion  8 mg/hr Intravenous Continuous Bernie Morales MD 10 mL/hr at 03/03/20 0733 8 mg/hr at 03/03/20 9358       Allergies: standard drinks                                Counseling given: Not Answered      Vital Signs (Current):   Vitals:    03/02/20 2004 03/03/20 0327 03/03/20 0845 03/03/20 1029   BP: 121/82 (!) 115/56 (!) 145/67 139/66   Pulse: 89 81 80 79   Resp: 18 18 18 18   Temp: 36.7 °C (98.1 °F) 36.6 °C (97.8 °F) 36.5 °C (97.7 °F)    TempSrc: Oral Oral Oral    SpO2: 95% 97% 97% 96%   Weight:                                                  BP Readings from Last 3 Encounters:   03/03/20 139/66   02/26/20 136/75   02/19/20 137/89       NPO Status: Time of last liquid consumption: 0900                        Time of last solid consumption: 0800                        Date of last liquid consumption: 03/02/20                        Date of last solid food consumption: 03/02/20    BMI:   Wt Readings from Last 3 Encounters:   03/02/20 228 lb (103.4 kg)   02/26/20 228 lb (103.4 kg)   02/19/20 228 lb (103.4 kg)     Body mass index is 40.39 kg/m².     CBC:   Lab Results   Component Value Date    WBC 12.1 03/03/2020    RBC 4.80 03/03/2020    HGB 14.5 03/03/2020    HCT 42.1 03/03/2020    MCV 87.7 03/03/2020    RDW 14.4 04/05/2018     03/03/2020       CMP:   Lab Results   Component Value Date     03/03/2020    K 3.8 03/03/2020     03/03/2020    CO2 23 03/03/2020    BUN 14 03/03/2020    CREATININE 0.8 03/03/2020    GFRAA >60 08/24/2016    LABGLOM >90 03/03/2020    GLUCOSE 167 03/03/2020    PROT 8.6 03/02/2020    CALCIUM 8.6 03/03/2020    BILITOT 0.4 03/02/2020    ALKPHOS 76 03/02/2020    AST 24 03/02/2020    ALT 18 03/02/2020       POC Tests:   Recent Labs     03/03/20  0816   POCGLU 190*       Coags:   Lab Results   Component Value Date    PROTIME 12.2 01/03/2019    INR 0.97 11/06/2019    APTT 34.9 11/06/2019       HCG (If Applicable): No results found for: PREGTESTUR, PREGSERUM, HCG, HCGQUANT     ABGs: No results found for: PHART, PO2ART, GES2WCD, JHZ2AYW, BEART, F7TPVMDY     Type & Screen (If Applicable):  Lab Results

## 2020-03-03 NOTE — CARE COORDINATION
3/3/20, 2:00 PM  DISCHARGE PLANNING EVALUATION:    Meg Browning       Admitted from: ER, patient presented with abdominal pain. 3/2/2020/ [place] 53 day: 1   Location: -11/011-A Reason for admit: GI bleed [K92.2]  GI bleed [K92.2] Status: Inpt. Admit order signed?: yes  PMH:  has a past medical history of Bipolar 2 disorder ([address] Diabetes mellitus type 2, uncontrolled ([address] Diabetic polyneuropathy ([address] Diabetic ulcer of right foot associated with type 2 diabetes mellitus ([address] Essential hypertension, GERD (gastroesophageal reflux disease), Hammer toe of left foot, Heart murmur, History of tobacco abuse, Hx of AKA (above knee amputation), right ([address] Macular edema, diabetic, bilateral ([address] Marijuana abuse in remission, Onychomycosis, Other disorders of kidney and ureter in diseases classified elsewhere, Sleep apnea, and 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 Procedure: 3/03/2020: EGD with dilation. Pertinent abnormal Imaging:CT of abdomen:   Partially decompressed bladder. Wall thickening which may be artifactual. No significant perivesicular infiltration. Correlate with urinalysis is advised. Moderate scattered stool throughout the colon. Correlate for constipation. No bowel wall thickening or obstruction       Medications:  Scheduled Meds:   sucralfate  1 g Oral 4x Daily AC & HS    docusate sodium  100 mg Oral Daily    pantoprazole  40 mg Oral BID AC    sodium chloride flush  10 mL Intravenous 2 times per day    calcium replacement protocol   Other RX Placeholder    magnesium replacement protocol   Other RX Placeholder    potassium replacement protocol   Other RX Placeholder     Continuous Infusions:   Pertinent Info/Orders/Treatment Plan:  Wbc 12.1, h/h 14.0/42. 1. GI consult, electrolyte replacement protocol, Protonix, Colace,  prn Tylenol, Dilaudid, Dulcolax, Glycolax, Zofran and Phenergan, telemetry, up with assistance.    Diet: DIET CARB CONTROL;

## 2020-03-03 NOTE — PROGRESS NOTES
Hospitalist Progress Note      Patient:  Shahriar Moses    Unit/Bed:5K-11/011-A  YOB: 1970  MRN: [id number]   Acct: [de-identified]   PCP: Glenda Severs, APRN - CNP  Date of Admission: 3/2/2020    Assessment and Plan:        1. Abdominal pain: Likely secondary to stool impaction (constipation) per CT abdomen/pelvis with IV contrast as of 3/2/2020, otherwise no concerns for bowel obstruction as per CT scan. FOBT was negative. Patient was n.p.o. overnight, GI consulted for EGD due to Melena. Continue PPI infusion, add carafate q6h for 2 weeks. 2. Upper GI bleed: represented with melena, treatment as in #1  3. Melena: On PPIs infusion, n.p.o. overnight, GI consulted for scope. 4. Constipation: MiraLAX, Colace, Dulcolax, monitor closely. 5. Chronic hyponatremia: likely 2/2 to uncontrolled glucose levels. Patient euvolemic, asymptomatic, fluid restriction < 1500 ml/day  6. Lactic acidosis on admission: Resolved as of 3/3/2020.  7. Type II DM uncontrolled: last A1c 8.3 3/2/20, on lantus 80 units bid with 20 units WALDEMAR tid. BS levels in the past 24 hours ranges between 149-190, patient currently NPO, may adjust insuline doses after EGD when patient starts eating by tomorrow if needed. Start this regimen after the scope as BS levels before the scope under control. 8. Essential hypertension controlled: continue home antihypertensive medications. 9. Bipolar disorder  10. 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 Disposition: awaiting for EGD, GI recs, advance diet after the scope, we will follow tomorrow. PMH, PSH, SH, FHX reviewed in chart review snap shot in EPIC    CC:  Abdominal pain, Melena  HPI: Initial admission HPI reviewed as below:  Patient is a 43 yo  male presenting to the ED today with complaints of abdominal pain that started about 2 weeks ago. The patient states the pain is generalized across his entire abdomin.  The pain is worse with eating, and he states nothing seems to make it better. He states the pain is a sharp, stabbing pain that comes and goes. He states the pain does not radiate, and he rates it 8/10. He states he has never had a similar experience before. He states that it has been a few days since his last black, tarry stool, but now his stools are more loose like diarrhea. Today he also admits to increased flatus. He denies BRBPR, abdominal distension, vomiting.     In the ED the patient was given IV protonix as well as a GI cocktail. A CT abdomen pelvis with contrast was obtained. The patient denied a rectal exam, so FOBT was obtained from a stool sample. For further details and updates please refer to assessment and plan at the beginning of the note. ROS (10 point review of systems completed. Pertinent positives noted. Otherwise ROS is negative) : abdominal pain  PMH:  Per HPI and reviewed in the chart  SHX: Reviewed in the chart, also the patient  FHX: Reviewed in the chart, also with the patient  Allergies: Reviewed in the chart  Medications:     sodium chloride 150 mL/hr at 03/02/20 1803    pantoprozole (PROTONIX) infusion 8 mg/hr (03/02/20 2121)      sodium chloride flush  10 mL Intravenous 2 times per day    calcium replacement protocol   Other RX Placeholder    magnesium replacement protocol   Other RX Placeholder    potassium replacement protocol   Other RX Placeholder   Subjective: mentioned his abdominal pain with acid reflex still ongoing, no fever, chills, or any other constitutional sxs. Nursing notes, vitals and labs reviewed.     Vital Signs:   Vitals reviewed and compared with the previous ones  BP (!) 115/56   Pulse 81   Temp 97.8 °F (36.6 °C) (Oral)   Resp 18   Wt 228 lb (103.4 kg)   SpO2 97%   BMI 40.39 kg/m²      Intake/Output Summary (Last 24 hours) at 3/3/2020 2939  Last data filed at 3/2/2020 2211  Gross per 24 hour   Intake 0 ml   Output --   Net 0 ml        General:   Age-appropriate, in no acute distress  HEENT:  normocephalic and atraumatic. No scleral icterus. PERRLA. Neck: supple. No thyromegaly. Lungs: clear to auscultation. No abnormal breathing sounds appreciated. Cardiac: S1, S2, RRR without murmur. No JVD. Abdomen: soft. Increased abdominal girth, Nontender. Bowel sounds positive. Extremities: Right AKA, pulses normal left LE and upper extremities. Vasculature: capillary refill < 3 seconds. Normal pulses LLE  Skin:  warm and dry. Not clammy. Psych:  Alert to time, person and place. Communicable. Affect appropriate  Lymph:  No supraclavicular ,and no anterior cervical lymphadenopathy. Neurologic:  No focal deficit. CN II-XII grossly intact. Motor and Sensory capacity intact in all extremities. Labs:   Recent Labs     03/02/20  1101 03/02/20  2110 03/03/20  0246   WBC 13.6*  --  12.1*   HGB 15.7 14.6 14.0   HCT 46.5  --  42.1     --  233     Recent Labs     03/02/20  1101 03/03/20  0246   * 139   K 4.5 3.8   CL 96* 103   CO2 20* 23   BUN 13 14   CREATININE 0.8 0.8   CALCIUM 9.3 8.6     Recent Labs     03/02/20  1101   AST 24   ALT 18   BILITOT 0.4   ALKPHOS 76     No results for input(s): INR in the last 72 hours. No results for input(s): Eulene Sarks in the last 72 hours. Microbiology:    Blood culture #1:   Lab Results   Component Value Date    BC No growth-preliminary No growth  12/18/2019       Blood culture #2:No results found for: [place]    Organism:  Lab Results   Component Value Date    ORG Mixed Growth 09/03/2019         Lab Results   Component Value Date    LABGRAM  04/04/2019     No segmented neutrophils observed. No epithelial cells observed. No bacteria seen.          MRSA culture only:No results found for: [facility]    Urine culture: No results found for: LABURIN    Respiratory culture: No results found for: CULTRESP    Aerobic and Anaerobic :  Lab Results   Component Value Date    LABAERO  04/04/2019     light growth  Group B streptococci are colon. No bowel wall thickening or obstruction. Partially decompressed bladder. Wall thickening which may be artifactual. No significant perivesicular infiltration. Correlate with urinalysis is advised. No acute osseous findings. Partially decompressed bladder. Wall thickening which may be artifactual. No significant perivesicular infiltration. Correlate with urinalysis is advised. Moderate scattered stool throughout the colon. Correlate for constipation. No bowel wall thickening or obstruction. **This report has been created using voice recognition software. It may contain minor errors which are inherent in voice recognition technology. ** Final report electronically signed by Dr. Camron Romero on 3/2/2020 12:49 PM      Discussed plan with patient. Patient verbalized understanding and agree. All questions addressed with concerns. Please excuse my TYPOS!     Electronically signed by Scott Metzger MD on 3/3/2020 at 7:22 AM

## 2020-03-03 NOTE — CONSULTS
[address] [place] [address]                                  CONSULTATION    PATIENT NAME: Ro Rivas                     :        1970  MED REC NO:   [id number]                           ROOM:       0011  ACCOUNT NO:   [de-identified]                           ADMIT DATE: 2020  PROVIDER:     Kaveh Stewart. Aliza Snyder M.D.    Anne-Marie Daniels:  2020    REASON FOR CONSULTATION:  The patient is a 78-year-old white male who  was admitted to the hospital because of recent history of black stool  and current symptoms of dyspepsia. GI consultation is requested for  assistance in evaluating this problem. The patient states that beginning approximately two weeks prior to  presentation, he began noting a substernal burning every time he would  eat. He would notice this especially as the food was passing through  his esophagus. After the food had reached his stomach, he continued to  feel \"gassy. \"  He also described a sharp pain in the epigastrium. He  experienced nausea without vomiting. He did note some dysphagia for  food such as hamburgers and cheeseburgers. He stated that he would have  to clear his throat because the food seemed to pass more slowly. The  patient denied nocturnal awakening. He also notes no recent change in  his weight. He notes that from 2020 to 2020 that he passed three to four  black-appearing stools per day. His stools have since become brown,  although he states that he is experiencing some loose stools after  eating. He denies use of aspirin or NSAIDs. Laboratory studies include a sodium 139, potassium 3.8, chloride 103,  CO2 of 23, BUN 14, creatinine 0.8, glucose 167. White count is 12,100;  hematocrit 42.1 with platelets 459,938. The hematocrit of 42.1 today compares with a value of 46.5 yesterday but  a value of 42.3 on 2019 and 47.7 on 2019.     [facility] also told the patient that I will consider him a candidate for  colonoscopy as well. However, this can be addressed after dealing with  his presenting problem.         Walter Monroy M.D.    D: 03/03/2020 9:44:18       T: 03/03/2020 10:16:52     [email or web address]  Job#: [id number]     Doc#: [id number]    CC:  Angus Mishra C.N.P.

## 2020-03-03 NOTE — PROGRESS NOTES
Drowsy. Arouses to verbal stimuli. Dr Christian Javier in to speak with pt regarding findings and plan of care.

## 2020-03-03 NOTE — BRIEF OP NOTE
Brief Postoperative Note  ______________________________________________________________    Patient: Myla Benitez  YOB: 1970  MRN: [id number]  Date of Procedure: 3/3/2020    Pre-Op Diagnosis: GI BLEED    Post-Op Diagnosis: Same       Procedure(s):  EGD DILATION GENARO    Anesthesia: Monitor Anesthesia Care    Surgeon(s):  Vanessa Noble MD    Assistant: none    Estimated Blood Loss (mL): less than 50     Complications: None    Specimens:   * No specimens in log *    Implants:  * No implants in log *      Drains: * No LDAs found *    Findings: normal; dilation to 47 F    Vanessa Noble MD  Date: 3/3/2020  Time: 11:40 AM

## 2020-03-03 NOTE — PROCEDURES
[address] [place] [address]                                 PROCEDURE NOTE    PATIENT NAME: Madhu Nobles                     :        1970  MED REC NO:   [id number]                           ROOM:  ACCOUNT NO:   [de-identified]                           ADMIT DATE: 2020  PROVIDER:     RICK Torres Pod:  2020    EGD AND DILATION NOTE    HISTORY:  A 49-year-old white male who presents today for EGD and  dilation because of symptoms of substernal burning discomfort, melena,  and dysphagia. The patient has had diabetes for 26 years. He has been taking Protonix  40 mg b.i.d. at home. During the two weeks preceding admission, he  complained of burning substernal pain and gassiness with eating. EGD  and dilation were planned as a part of the evaluation of these problems. ASA class is III. SEDATION:  Please see the sedation record of the anesthesiology  department. DESCRIPTION OF PROCEDURE:  The patient was placed in the left lateral  decubitus position, and the Olympus GIF-H190 video endoscope was  introduced. The esophagus was normal.  The The esophagogastric junction  was seen at 40 cm. It was examined with standard light narrow-band  imaging. It was considered normal.  The stomach was normal in  appearance. The pylorus, duodenal bulb, and descending duodenum were  all within normal limits. Retroflexion view of the cardia and fundus  showed no other changes. The endoscope was then repositioned to the distal portion of the gastric  antrum. Under direct vision, guidewire from the American dilating  system was introduced through the biopsy channel of the endoscope and  advanced into the gastric antrum. The guidewire remained in the antrum  while the endoscope was removed over the guidewire.   The patient was  then serially dilated using American dilators sizes 45, 48, 51, and  54-Uzbek. With each dilator, there was no resistance, blood, or pain. Following passage of 54-Uzbek dilator, both guidewire and bougie were  removed together. Estimated blood loss was <50cc. ASSESSMENT:  1. Normal EGD. 2.  Dilation to size 54-Uzbek American bougie. COMMENT AND RECOMMENDATIONS:  The findings on today's exam were very  normal and certainly do not explain the patient's symptoms nor did they  explain melena. The patient has never had a colonoscopy, and I  certainly think he should be considered a candidate for one. Since he  has no evidence of active bleeding, I do not see an indication for him  to remain in the hospital.    I will schedule the patient for an outpatient colonoscopy exam.  I would  place him on Carafate liquid 1 gm a.c. and h.s. to take in addition to  Protonix 40 mg b.i.d. If the colonoscopy does not reveal any obvious  pathology, then I would further recommend a capsule endoscopy study or a  magnetic resonance enterography. Sheridan Aadn M.D.    D: 03/03/2020 11:52:34       T: 03/03/2020 12:15:45     [email or web address]  Job#: [id number]     Doc#: [id number]    CC:   Hospitalist Service       Maverick Cristina C.N.P.

## 2020-03-04 VITALS
OXYGEN SATURATION: 96 % | TEMPERATURE: 98 F | DIASTOLIC BLOOD PRESSURE: 95 MMHG | SYSTOLIC BLOOD PRESSURE: 138 MMHG | HEART RATE: 94 BPM | WEIGHT: 228 LBS | BODY MASS INDEX: 40.39 KG/M2 | RESPIRATION RATE: 18 BRPM

## 2020-03-04 LAB
ANION GAP SERPL CALCULATED.3IONS-SCNC: 15 MEQ/L (ref 8–16)
BASOPHILS # BLD: 0.4 %
BASOPHILS ABSOLUTE: 0 THOU/MM3 (ref 0–0.1)
BUN BLDV-MCNC: 13 MG/DL (ref 7–22)
CALCIUM SERPL-MCNC: 9.3 MG/DL (ref 8.5–10.5)
CHLORIDE BLD-SCNC: 101 MEQ/L (ref 98–111)
CO2: 21 MEQ/L (ref 23–33)
CREAT SERPL-MCNC: 0.7 MG/DL (ref 0.4–1.2)
EOSINOPHIL # BLD: 6.3 %
EOSINOPHILS ABSOLUTE: 0.6 THOU/MM3 (ref 0–0.4)
ERYTHROCYTE [DISTWIDTH] IN BLOOD BY AUTOMATED COUNT: 13.7 % (ref 11.5–14.5)
ERYTHROCYTE [DISTWIDTH] IN BLOOD BY AUTOMATED COUNT: 43.4 FL (ref 35–45)
GFR SERPL CREATININE-BSD FRML MDRD: > 90 ML/MIN/1.73M2
GLUCOSE BLD-MCNC: 285 MG/DL (ref 70–108)
HCT VFR BLD CALC: 41.7 % (ref 42–52)
HEMOGLOBIN: 14.1 GM/DL (ref 14–18)
IMMATURE GRANS (ABS): 0.04 THOU/MM3 (ref 0–0.07)
IMMATURE GRANULOCYTES: 0.4 %
LYMPHOCYTES # BLD: 20.8 %
LYMPHOCYTES ABSOLUTE: 1.9 THOU/MM3 (ref 1–4.8)
MCH RBC QN AUTO: 29.5 PG (ref 26–33)
MCHC RBC AUTO-ENTMCNC: 33.8 GM/DL (ref 32.2–35.5)
MCV RBC AUTO: 87.2 FL (ref 80–94)
MONOCYTES # BLD: 8.2 %
MONOCYTES ABSOLUTE: 0.8 THOU/MM3 (ref 0.4–1.3)
NUCLEATED RED BLOOD CELLS: 0 /100 WBC
PLATELET # BLD: 200 THOU/MM3 (ref 130–400)
PMV BLD AUTO: 9.6 FL (ref 9.4–12.4)
POTASSIUM SERPL-SCNC: 4.2 MEQ/L (ref 3.5–5.2)
RBC # BLD: 4.78 MILL/MM3 (ref 4.7–6.1)
SEG NEUTROPHILS: 63.9 %
SEGMENTED NEUTROPHILS ABSOLUTE COUNT: 5.9 THOU/MM3 (ref 1.8–7.7)
SODIUM BLD-SCNC: 137 MEQ/L (ref 135–145)
WBC # BLD: 9.2 THOU/MM3 (ref 4.8–10.8)

## 2020-03-04 PROCEDURE — 36415 COLL VENOUS BLD VENIPUNCTURE: CPT

## 2020-03-04 PROCEDURE — 85025 COMPLETE CBC W/AUTO DIFF WBC: CPT

## 2020-03-04 PROCEDURE — 99238 HOSP IP/OBS DSCHRG MGMT 30/<: CPT | Performed by: FAMILY MEDICINE

## 2020-03-04 PROCEDURE — 6370000000 HC RX 637 (ALT 250 FOR IP): Performed by: FAMILY MEDICINE

## 2020-03-04 PROCEDURE — 2580000003 HC RX 258: Performed by: PHYSICIAN ASSISTANT

## 2020-03-04 PROCEDURE — 80048 BASIC METABOLIC PNL TOTAL CA: CPT

## 2020-03-04 PROCEDURE — 6370000000 HC RX 637 (ALT 250 FOR IP): Performed by: INTERNAL MEDICINE

## 2020-03-04 RX ADMIN — DOCUSATE SODIUM 100 MG: 100 CAPSULE, LIQUID FILLED ORAL at 08:43

## 2020-03-04 RX ADMIN — PANTOPRAZOLE SODIUM 40 MG: 40 TABLET, DELAYED RELEASE ORAL at 06:22

## 2020-03-04 RX ADMIN — Medication 10 ML: at 08:44

## 2020-03-04 RX ADMIN — SUCRALFATE 1 G: 1 TABLET ORAL at 06:22

## 2020-03-04 ASSESSMENT — PAIN SCALES - GENERAL
PAINLEVEL_OUTOF10: 5
PAINLEVEL_OUTOF10: 0

## 2020-03-04 ASSESSMENT — PAIN DESCRIPTION - PAIN TYPE: TYPE: ACUTE PAIN

## 2020-03-04 ASSESSMENT — PAIN DESCRIPTION - FREQUENCY: FREQUENCY: CONTINUOUS

## 2020-03-04 ASSESSMENT — PAIN DESCRIPTION - ORIENTATION: ORIENTATION: MID

## 2020-03-04 ASSESSMENT — PAIN DESCRIPTION - ONSET: ONSET: ON-GOING

## 2020-03-04 ASSESSMENT — PAIN DESCRIPTION - DESCRIPTORS: DESCRIPTORS: CRAMPING;SHARP

## 2020-03-04 ASSESSMENT — PAIN DESCRIPTION - LOCATION: LOCATION: ABDOMEN

## 2020-03-04 NOTE — DISCHARGE SUMMARY
Peripheral Pulses: +2 palpable, equal bilaterally       Labs: For convenience and continuity at follow-up the following most recent labs are provided:      CBC:    Lab Results   Component Value Date    WBC 9.2 03/04/2020    HGB 14.1 03/04/2020    HCT 41.7 03/04/2020     03/04/2020       Renal:    Lab Results   Component Value Date     03/04/2020    K 4.2 03/04/2020    K 3.8 03/03/2020     03/04/2020    CO2 21 03/04/2020    BUN 13 03/04/2020    CREATININE 0.7 03/04/2020    CALCIUM 9.3 03/04/2020    PHOS 2.6 02/16/2019         Significant Diagnostic Studies    Radiology:   CT ABDOMEN PELVIS W IV CONTRAST Additional Contrast? None   Final Result   Partially decompressed bladder. Wall thickening which may be artifactual. No significant perivesicular infiltration. Correlate with urinalysis is advised. Moderate scattered stool throughout the colon. Correlate for constipation. No bowel wall thickening or obstruction. **This report has been created using voice recognition software. It may contain minor errors which are inherent in voice recognition technology. **      Final report electronically signed by Dr. Yao Estes on 3/2/2020 12:49 PM             Consults:     IP CONSULT TO GI    Disposition:    [x] Home       [] TCU       [] Rehab       [] Psych       [] SNF       [] [place]       [] Other-    Condition at Discharge: Stable    Code Status:  Full Code     Patient Instructions:    Discharge lab work: Activity: activity as tolerated  Diet: DIET CARB CONTROL;       Follow-up visits:   MARILYN Steele - [facility]  [address]  [facility] [address] [phone]               Discharge Medications:      Stephanie First   Home Medication Instructions QTW:[id number]    Printed on:03/04/20 5908   Medication Information                      AMINO ACIDS COMPLEX PO  Take 1 each by mouth daily Daily AA drink             amLODIPine (NORVASC) 2.5 MG tablet  Take 1 tablet by mouth

## 2020-03-04 NOTE — CARE COORDINATION
Delay in discharge as patient refused yesterday requesting a new GI physician. He is discharged today with orders for outpatient GI follow-up. 3/4/20, 10:37 AM    Patient goals/plan/ treatment preferences discussed by  and . Patient goals/plan/ treatment preferences reviewed with patient/ family. Patient/ family verbalize understanding of discharge plan and are in agreement with goal/plan/treatment preferences. Understanding was demonstrated using the teach back method. AVS provided by RN at time of discharge, which includes all necessary medical information pertaining to the patients current course of illness, treatment, post-discharge goals of care, and treatment preferences.         IMM Letter  IMM Letter given to Patient/Family/Significant other/Guardian/POA/by[de-identified] staff  IMM Letter date given[de-identified] 03/02/20  IMM Letter time given[de-identified] 1975

## 2020-03-04 NOTE — PLAN OF CARE
Problem: Falls - Risk of:  Goal: Will remain free from falls  Description  Will remain free from falls  Outcome: Ongoing  Note:   Patient remains free from falls this shift. Fall sign posted. Bed alarm on. Bed in lowest position. 2/4 side rails up. Nonskid sock on left foot, right lower extremity amputated in 2016. Call light and all possessions within reach. Hourly rounding. Transfers to wheelchair standby assist.        Problem: Discharge Planning:  Goal: Discharged to appropriate level of care  Description  Discharged to appropriate level of care  Outcome: Ongoing  Note:   From home with friends. Plans to return home at discharge. Problem: Pain:  Goal: Control of acute pain  Description  Control of acute pain  Outcome: Ongoing  Note:   Pain goal 4/10. Reports no pain this shift. PRN dilaudid available every three hours. Problem: Nutrition Deficit:  Goal: Ability to achieve adequate nutritional intake will improve  Description  Ability to achieve adequate nutritional intake will improve  Outcome: Ongoing  Note:   Patient on Carb Control diet. No IV fluids running. Will continue to monitor. Problem: Activity:  Goal: Risk for activity intolerance will decrease  Description  Risk for activity intolerance will decrease  Note:   Patient RLE amputated in 2016. Transfers from wheelchair to chair and bed standby assist.     Care plan reviewed with patient. Patient verbalizes understanding of the plan of care and contributes to goal setting.

## 2020-03-04 NOTE — PROGRESS NOTES
All discharge instructions given to patient and family with no further questions at this time. Patient requests to make own appointments. Patient to be discharged off unit via wheelchair. Chart contents placed in yellow bin.    Electronically signed by Casie Haider RN on 3/4/2020 at 12:23 PM

## 2020-03-04 NOTE — PROGRESS NOTES
Spoke with patient regarding hopitalist and GI doctor wanting to discharge patient. GI doctor stated that patient could be discharged and do follow up colonoscopy as outpatient. Patient became very irate and yelling stating he wanted to sign out AMA. RN went into patients room and spoke to him regarding his frustrations and verbalized to patient that we would call the hospitalist to come see the patient and we would try to resolve this matter. Patient stated that he wanted his colonoscopy done this admission.  also came into room to speak to patient. Patient calmed down and stated he would stay but wanted a different GI doctor to see him tomorrow.

## 2020-03-05 ENCOUNTER — CARE COORDINATION (OUTPATIENT)
Dept: CASE MANAGEMENT | Age: 50
End: 2020-03-05

## 2020-03-05 ENCOUNTER — TELEPHONE (OUTPATIENT)
Dept: INTERNAL MEDICINE CLINIC | Age: 50
End: 2020-03-05

## 2020-03-05 NOTE — TELEPHONE ENCOUNTER
Patient called asking for new order for PT, he just got out of the hospital. I spoke with Kel Henry and she wants to know if the PT is just for his prosthesis. If there is more going on then Katia Hayes will need seen for hospital follow up.     I left msg on VM for patient to call office back

## 2020-03-06 ENCOUNTER — CARE COORDINATION (OUTPATIENT)
Dept: CASE MANAGEMENT | Age: 50
End: 2020-03-06

## 2020-03-07 ENCOUNTER — CARE COORDINATION (OUTPATIENT)
Dept: CASE MANAGEMENT | Age: 50
End: 2020-03-07

## 2020-03-07 NOTE — CARE COORDINATION
Lanie 45 Transitions Initial Follow Up Call    Call within 2 business days of discharge: Yes    Patient: Malena Dee Patient : 1970   MRN: [id number]  Reason for Admission:   Discharge Date: 3/4/20 RARS: Readmission Risk Score: 36      Last Discharge [facility]       Complaint Diagnosis Description Type Department Provider    3/2/20 [facility]  ED to Hosp-Admission (Discharged) (ADMITTED) BLANK 5K Saskia Ramires MD; Layton Melchor. ..           3rd attempt for 24 hour call. Left message to return call.     Follow Up  Future Appointments   Date Time Provider Ebenezer Carmichael   3/10/2020  9:30  [facility]   3/10/2020  1:40 PM Romana Sandy, APRN - CNP [facility] [place]   2020  2:00 PM Romana Sandy, APRN - CNP [facility] [place]   2020  2:30 PM PATRICIA MoralesC [address] Mehul Richey, RN

## 2020-03-08 ENCOUNTER — APPOINTMENT (OUTPATIENT)
Dept: CT IMAGING | Age: 50
End: 2020-03-08
Payer: MEDICARE

## 2020-03-08 ENCOUNTER — HOSPITAL ENCOUNTER (EMERGENCY)
Age: 50
Discharge: HOME | End: 2020-03-09
Attending: EMERGENCY MEDICINE
Payer: MEDICARE

## 2020-03-08 VITALS
DIASTOLIC BLOOD PRESSURE: 57 MMHG | TEMPERATURE: 97.5 F | OXYGEN SATURATION: 98 % | HEART RATE: 99 BPM | WEIGHT: 228 LBS | RESPIRATION RATE: 16 BRPM | SYSTOLIC BLOOD PRESSURE: 150 MMHG | HEIGHT: 63 IN | BODY MASS INDEX: 40.4 KG/M2

## 2020-03-08 LAB
ALBUMIN SERPL-MCNC: 3.9 G/DL (ref 3.5–5.1)
ALP BLD-CCNC: 72 U/L (ref 38–126)
ALT SERPL-CCNC: 22 U/L (ref 11–66)
AMPHETAMINE+METHAMPHETAMINE URINE SCREEN: NEGATIVE
ANION GAP SERPL CALCULATED.3IONS-SCNC: 17 MEQ/L (ref 8–16)
AST SERPL-CCNC: 24 U/L (ref 5–40)
BARBITURATE QUANTITATIVE URINE: NEGATIVE
BASOPHILS # BLD: 0.7 %
BASOPHILS ABSOLUTE: 0.1 THOU/MM3 (ref 0–0.1)
BENZODIAZEPINE QUANTITATIVE URINE: NEGATIVE
BILIRUB SERPL-MCNC: 0.2 MG/DL (ref 0.3–1.2)
BILIRUBIN DIRECT: < 0.2 MG/DL (ref 0–0.3)
BILIRUBIN URINE: NEGATIVE
BLOOD, URINE: NEGATIVE
BUN BLDV-MCNC: 11 MG/DL (ref 7–22)
CALCIUM SERPL-MCNC: 9.2 MG/DL (ref 8.5–10.5)
CANNABINOID QUANTITATIVE URINE: NEGATIVE
CHARACTER, URINE: CLEAR
CHLORIDE BLD-SCNC: 97 MEQ/L (ref 98–111)
CO2: 20 MEQ/L (ref 23–33)
COCAINE METABOLITE QUANTITATIVE URINE: NEGATIVE
COLOR: YELLOW
CREAT SERPL-MCNC: 0.8 MG/DL (ref 0.4–1.2)
EKG ATRIAL RATE: 103 BPM
EKG P AXIS: 52 DEGREES
EKG P-R INTERVAL: 156 MS
EKG Q-T INTERVAL: 370 MS
EKG QRS DURATION: 80 MS
EKG QTC CALCULATION (BAZETT): 484 MS
EKG R AXIS: 30 DEGREES
EKG T AXIS: 55 DEGREES
EKG VENTRICULAR RATE: 103 BPM
EOSINOPHIL # BLD: 5.6 %
EOSINOPHILS ABSOLUTE: 0.7 THOU/MM3 (ref 0–0.4)
ERYTHROCYTE [DISTWIDTH] IN BLOOD BY AUTOMATED COUNT: 13.8 % (ref 11.5–14.5)
ERYTHROCYTE [DISTWIDTH] IN BLOOD BY AUTOMATED COUNT: 43.5 FL (ref 35–45)
ETHYL ALCOHOL, SERUM: < 0.01 %
GFR SERPL CREATININE-BSD FRML MDRD: > 90 ML/MIN/1.73M2
GLUCOSE BLD-MCNC: 441 MG/DL (ref 70–108)
GLUCOSE URINE: >= 1000 MG/DL
HCT VFR BLD CALC: 43.7 % (ref 42–52)
HEMOGLOBIN: 14.9 GM/DL (ref 14–18)
IMMATURE GRANS (ABS): 0.08 THOU/MM3 (ref 0–0.07)
IMMATURE GRANULOCYTES: 0.7 %
KETONES, URINE: NEGATIVE
LEUKOCYTE ESTERASE, URINE: NEGATIVE
LIPASE: 20.6 U/L (ref 5.6–51.3)
LYMPHOCYTES # BLD: 24.3 %
LYMPHOCYTES ABSOLUTE: 2.9 THOU/MM3 (ref 1–4.8)
MAGNESIUM: 1.7 MG/DL (ref 1.6–2.4)
MCH RBC QN AUTO: 29.6 PG (ref 26–33)
MCHC RBC AUTO-ENTMCNC: 34.1 GM/DL (ref 32.2–35.5)
MCV RBC AUTO: 86.7 FL (ref 80–94)
MONOCYTES # BLD: 6.8 %
MONOCYTES ABSOLUTE: 0.8 THOU/MM3 (ref 0.4–1.3)
NITRITE, URINE: NEGATIVE
NUCLEATED RED BLOOD CELLS: 0 /100 WBC
OPIATES, URINE: NEGATIVE
OSMOLALITY CALCULATION: 286.7 MOSMOL/KG (ref 275–300)
OXYCODONE: NEGATIVE
PH UA: 5 (ref 5–9)
PHENCYCLIDINE QUANTITATIVE URINE: NEGATIVE
PLATELET # BLD: 263 THOU/MM3 (ref 130–400)
PMV BLD AUTO: 9.8 FL (ref 9.4–12.4)
POTASSIUM SERPL-SCNC: 4.1 MEQ/L (ref 3.5–5.2)
PROTEIN UA: NEGATIVE
RBC # BLD: 5.04 MILL/MM3 (ref 4.7–6.1)
SEG NEUTROPHILS: 61.9 %
SEGMENTED NEUTROPHILS ABSOLUTE COUNT: 7.4 THOU/MM3 (ref 1.8–7.7)
SODIUM BLD-SCNC: 134 MEQ/L (ref 135–145)
SPECIFIC GRAVITY, URINE: > 1.03 (ref 1–1.03)
TOTAL PROTEIN: 7.8 G/DL (ref 6.1–8)
TROPONIN T: < 0.01 NG/ML
UROBILINOGEN, URINE: 0.2 EU/DL (ref 0–1)
WBC # BLD: 11.9 THOU/MM3 (ref 4.8–10.8)

## 2020-03-08 PROCEDURE — 85025 COMPLETE CBC W/AUTO DIFF WBC: CPT

## 2020-03-08 PROCEDURE — 83735 ASSAY OF MAGNESIUM: CPT

## 2020-03-08 PROCEDURE — 2580000003 HC RX 258: Performed by: EMERGENCY MEDICINE

## 2020-03-08 PROCEDURE — 36415 COLL VENOUS BLD VENIPUNCTURE: CPT

## 2020-03-08 PROCEDURE — 96375 TX/PRO/DX INJ NEW DRUG ADDON: CPT

## 2020-03-08 PROCEDURE — 6360000004 HC RX CONTRAST MEDICATION: Performed by: EMERGENCY MEDICINE

## 2020-03-08 PROCEDURE — 6360000002 HC RX W HCPCS: Performed by: EMERGENCY MEDICINE

## 2020-03-08 PROCEDURE — 80053 COMPREHEN METABOLIC PANEL: CPT

## 2020-03-08 PROCEDURE — 81003 URINALYSIS AUTO W/O SCOPE: CPT

## 2020-03-08 PROCEDURE — 6370000000 HC RX 637 (ALT 250 FOR IP): Performed by: EMERGENCY MEDICINE

## 2020-03-08 PROCEDURE — 93005 ELECTROCARDIOGRAM TRACING: CPT | Performed by: EMERGENCY MEDICINE

## 2020-03-08 PROCEDURE — 80307 DRUG TEST PRSMV CHEM ANLYZR: CPT

## 2020-03-08 PROCEDURE — 96374 THER/PROPH/DIAG INJ IV PUSH: CPT

## 2020-03-08 PROCEDURE — 74177 CT ABD & PELVIS W/CONTRAST: CPT

## 2020-03-08 PROCEDURE — 83690 ASSAY OF LIPASE: CPT

## 2020-03-08 PROCEDURE — 82248 BILIRUBIN DIRECT: CPT

## 2020-03-08 PROCEDURE — 84484 ASSAY OF TROPONIN QUANT: CPT

## 2020-03-08 PROCEDURE — 99284 EMERGENCY DEPT VISIT MOD MDM: CPT

## 2020-03-08 PROCEDURE — G0480 DRUG TEST DEF 1-7 CLASSES: HCPCS

## 2020-03-08 RX ORDER — DICYCLOMINE HYDROCHLORIDE 10 MG/1
10 CAPSULE ORAL 4 TIMES DAILY
Qty: 360 CAPSULE | Refills: 1 | Status: SHIPPED | OUTPATIENT
Start: 2020-03-08 | End: 2020-03-11 | Stop reason: SDUPTHER

## 2020-03-08 RX ORDER — ONDANSETRON 4 MG/1
4 TABLET, ORALLY DISINTEGRATING ORAL ONCE
Status: COMPLETED | OUTPATIENT
Start: 2020-03-08 | End: 2020-03-08

## 2020-03-08 RX ORDER — DICYCLOMINE HYDROCHLORIDE 10 MG/1
10 CAPSULE ORAL ONCE
Status: COMPLETED | OUTPATIENT
Start: 2020-03-08 | End: 2020-03-08

## 2020-03-08 RX ORDER — METOCLOPRAMIDE 10 MG/1
10 TABLET ORAL 4 TIMES DAILY
Qty: 120 TABLET | Refills: 0 | Status: SHIPPED | OUTPATIENT
Start: 2020-03-08 | End: 2020-03-11 | Stop reason: SDUPTHER

## 2020-03-08 RX ORDER — METOCLOPRAMIDE HYDROCHLORIDE 5 MG/ML
10 INJECTION INTRAMUSCULAR; INTRAVENOUS ONCE
Status: COMPLETED | OUTPATIENT
Start: 2020-03-08 | End: 2020-03-08

## 2020-03-08 RX ORDER — 0.9 % SODIUM CHLORIDE 0.9 %
1000 INTRAVENOUS SOLUTION INTRAVENOUS ONCE
Status: COMPLETED | OUTPATIENT
Start: 2020-03-08 | End: 2020-03-09

## 2020-03-08 RX ORDER — TRAMADOL HYDROCHLORIDE 50 MG/1
50 TABLET ORAL EVERY 8 HOURS PRN
Qty: 6 TABLET | Refills: 0 | Status: SHIPPED | OUTPATIENT
Start: 2020-03-08 | End: 2020-03-10

## 2020-03-08 RX ORDER — HYDROCODONE BITARTRATE AND ACETAMINOPHEN 5; 325 MG/1; MG/1
1 TABLET ORAL ONCE
Status: COMPLETED | OUTPATIENT
Start: 2020-03-08 | End: 2020-03-08

## 2020-03-08 RX ORDER — PANTOPRAZOLE SODIUM 40 MG/1
40 TABLET, DELAYED RELEASE ORAL ONCE
Status: COMPLETED | OUTPATIENT
Start: 2020-03-08 | End: 2020-03-08

## 2020-03-08 RX ORDER — MORPHINE SULFATE 2 MG/ML
2 INJECTION, SOLUTION INTRAMUSCULAR; INTRAVENOUS ONCE
Status: COMPLETED | OUTPATIENT
Start: 2020-03-08 | End: 2020-03-08

## 2020-03-08 RX ADMIN — HYDROCODONE BITARTRATE AND ACETAMINOPHEN 1 TABLET: 5; 325 TABLET ORAL at 23:59

## 2020-03-08 RX ADMIN — METOCLOPRAMIDE 10 MG: 5 INJECTION, SOLUTION INTRAMUSCULAR; INTRAVENOUS at 23:59

## 2020-03-08 RX ADMIN — ONDANSETRON 4 MG: 4 TABLET, ORALLY DISINTEGRATING ORAL at 21:13

## 2020-03-08 RX ADMIN — SODIUM CHLORIDE 1000 ML: 9 INJECTION, SOLUTION INTRAVENOUS at 22:32

## 2020-03-08 RX ADMIN — IOPAMIDOL 85 ML: 755 INJECTION, SOLUTION INTRAVENOUS at 22:13

## 2020-03-08 RX ADMIN — Medication 6 UNITS: at 22:32

## 2020-03-08 RX ADMIN — MORPHINE SULFATE 2 MG: 2 INJECTION, SOLUTION INTRAMUSCULAR; INTRAVENOUS at 21:25

## 2020-03-08 RX ADMIN — LIDOCAINE HYDROCHLORIDE: 20 SOLUTION ORAL; TOPICAL at 21:13

## 2020-03-08 RX ADMIN — DICYCLOMINE HYDROCHLORIDE 10 MG: 10 CAPSULE ORAL at 23:59

## 2020-03-08 RX ADMIN — PANTOPRAZOLE SODIUM 40 MG: 40 TABLET, DELAYED RELEASE ORAL at 21:13

## 2020-03-08 ASSESSMENT — ENCOUNTER SYMPTOMS
EYE REDNESS: 0
WHEEZING: 0
EYE DISCHARGE: 0
VOICE CHANGE: 0
EYE PAIN: 0
COUGH: 0
VOMITING: 1
SINUS PRESSURE: 0
SHORTNESS OF BREATH: 0
BACK PAIN: 0
EYE ITCHING: 0
ABDOMINAL DISTENTION: 1
CONSTIPATION: 0
CHOKING: 0
CHEST TIGHTNESS: 0
DIARRHEA: 0
RHINORRHEA: 0
PHOTOPHOBIA: 0
TROUBLE SWALLOWING: 0
ABDOMINAL PAIN: 1
SORE THROAT: 0
BLOOD IN STOOL: 0
NAUSEA: 1

## 2020-03-08 ASSESSMENT — PAIN DESCRIPTION - PAIN TYPE
TYPE: ACUTE PAIN
TYPE: ACUTE PAIN;CHRONIC PAIN
TYPE: ACUTE PAIN

## 2020-03-08 ASSESSMENT — PAIN SCALES - GENERAL
PAINLEVEL_OUTOF10: 8
PAINLEVEL_OUTOF10: 9
PAINLEVEL_OUTOF10: 5
PAINLEVEL_OUTOF10: 8
PAINLEVEL_OUTOF10: 9
PAINLEVEL_OUTOF10: 5

## 2020-03-08 ASSESSMENT — PAIN DESCRIPTION - LOCATION
LOCATION: ABDOMEN

## 2020-03-08 ASSESSMENT — PAIN DESCRIPTION - ORIENTATION: ORIENTATION: LEFT;RIGHT;MID;UPPER

## 2020-03-09 PROCEDURE — 93010 ELECTROCARDIOGRAM REPORT: CPT | Performed by: NUCLEAR MEDICINE

## 2020-03-09 NOTE — ED PROVIDER NOTES
[facility]  eMERGENCY dEPARTMENT eNCOUnter          [address] [facility]       Chief Complaint   Patient presents with    Abdominal Pain    Bloated    Hematemesis     X 2 days       Nurses Notes reviewed and I agree except as noted in the HPI. HISTORY OF PRESENT ILLNESS    Rhonda Cox is a 44 y.o. male who presents diffuse abdominal pain and nausea and vomiting. Patient states he was in here last week. He saw Dr. Susan Mack. He had an EGD performed. They found nothing wrong. Patient does have uncontrolled diabetes. This is most likely diabetic gastroparesis. Patient has diffuse abdominal pain however it is not focal.  Patient is not complaining of any other physical complaints at this time. REVIEW OF SYSTEMS     Review of Systems   Constitutional: Negative for activity change, appetite change, diaphoresis, fatigue and unexpected weight change. HENT: Negative for congestion, ear discharge, ear pain, hearing loss, rhinorrhea, sinus pressure, sore throat, trouble swallowing and voice change. Eyes: Negative for photophobia, pain, discharge, redness and itching. Respiratory: Negative for cough, choking, chest tightness, shortness of breath and wheezing. Cardiovascular: Negative for chest pain, palpitations and leg swelling. Gastrointestinal: Positive for abdominal distention, abdominal pain, nausea and vomiting. Negative for blood in stool, constipation and diarrhea. Endocrine: Negative for polydipsia, polyphagia and polyuria. Genitourinary: Negative for decreased urine volume, difficulty urinating, dysuria, enuresis, frequency, hematuria and urgency. Musculoskeletal: Negative for arthralgias, back pain, gait problem, myalgias, neck pain and neck stiffness. Skin: Negative for pallor and rash. Allergic/Immunologic: Negative for immunocompromised state.    Neurological: Negative for dizziness, tremors, seizures, syncope, facial asymmetry, weakness, light-headedness, numbness and headaches. Hematological: Negative for adenopathy. Does not bruise/bleed easily. Psychiatric/Behavioral: Negative for agitation, hallucinations and suicidal ideas. The patient is not nervous/anxious. PAST MEDICAL HISTORY    has a past medical history of Bipolar 2 disorder ([address] Diabetes mellitus type 2, uncontrolled ([address] Diabetic polyneuropathy ([address] Diabetic ulcer of right foot associated with type 2 diabetes mellitus ([address] Essential hypertension, GERD (gastroesophageal reflux disease), Hammer toe of left foot, Heart murmur, History of tobacco abuse, Hx of AKA (above knee amputation), right ([address] Macular edema, diabetic, bilateral ([address] Marijuana abuse in remission, Onychomycosis, Other disorders of kidney and ureter in diseases classified elsewhere, Sleep apnea, and 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 SURGICAL HISTORY      has a past surgical history that includes other surgical history (Right, 1/14/15); Abscess Drainage (Right); Toe amputation (Right, 1/16/15); Foot Debridement (Right, 07/01/2016); Leg amputation below knee (Right, 07/20/2016); Lawrence tooth extraction (?when); pr drain infect shoulder bursa (Left, 8/18/2017); pr office/outpt visit,procedure only (Right, 9/20/2018); incision and drainage (Right, 2/18/2019); Leg amputation below knee (Right, 4/4/2019); AMPUTATION ABOVE KNEE (Right, 4/18/2019); and Upper gastrointestinal endoscopy (N/A, 3/3/2020).     CURRENT MEDICATIONS       Discharge Medication List as of 3/8/2020 11:53 PM      CONTINUE these medications which have NOT CHANGED    Details   polyethylene glycol (GLYCOLAX) packet Take 17 g by mouth daily as needed for Constipation, Disp-527 g, R-0Normal      pantoprazole (PROTONIX) 40 MG tablet Take 1 tablet by mouth 2 times daily (before meals), Disp-60 tablet, R-0Normal      sucralfate (CARAFATE) 1 GM tablet Take 1 tablet by mouth 4 times daily (before meals and nightly), Disp-120 tablet, R-0Normal      linagliptin (TRADJENTA) 5 MG tablet Take 1 tablet by mouth daily, Disp-30 tablet, R-5Normal      amLODIPine (NORVASC) 2.5 MG tablet Take 1 tablet by mouth daily, Disp-30 tablet, R-3Normal      sertraline (ZOLOFT) 50 MG tablet Take 1 tablet by mouth daily, Disp-30 tablet, R-5Normal      Lancets MISC Disp-200 each, R-0, NormalUse to test blood sugars 4 times daily DX:E11.65      insulin aspart (NOVOLOG FLEXPEN) 100 UNIT/ML injection pen Inject 20 Units into the skin 3 times daily (before meals), Disp-5 pen, R-3Normal      insulin detemir (LEVEMIR FLEXTOUCH) 100 UNIT/ML injection pen Inject 80 Units into the skin 2 times daily, Disp-30 pen, R-5Normal      pioglitazone (ACTOS) 30 MG tablet Take 1 tablet by mouth daily, Disp-30 tablet, R-2Normal      gabapentin (NEURONTIN) 300 MG capsule TAKE 1 CAPSULE BY MOUTH 3 TIMES A DAY, Disp-90 capsule, R-1Normal      AMINO ACIDS COMPLEX PO Take 1 each by mouth daily Daily AA drinkHistorical Med      blood glucose monitor kit and supplies Accu Chek Sheila meter. Use to test blood sugars DX:E11.65, Disp-1 kit, R-0, Normal      blood glucose monitor strips Accucheck Sheila Test Strips Test blood sugars 4 times daily DX:E11.65, Disp-400 strip, R-5, Normal      Insulin Syringe-Needle U-100 29G X 1/2\" 0.3 ML MISC 4 TIMES DAILY AFTER MEALS AND BEFORE BEDTIME Starting 2019, Disp-200 each, R-0, Normal             ALLERGIES     is allergic to pcn [penicillins] and clindamycin/lincomycin. FAMILY HISTORY     He indicated that his mother is . He reported the following about his father: unknown. He indicated that the status of his maternal grandmother is unknown.  He indicated that the status of his maternal grandfather is unknown.   family history includes Arthritis in his maternal grandfather; Depression in his mother; Diabetes in his mother; Early Death in his mother; Heart Disease in his maternal grandfather; High Blood Pressure in his mother; High Cholesterol in his mother; Other in his mother; Vision Loss in his maternal grandmother. SOCIAL HISTORY      reports that he quit smoking about 35 years ago. His smoking use included cigars. He has a 65.00 pack-year smoking history. He has never used smokeless tobacco. He reports previous alcohol use. He reports that he does not use drugs. PHYSICAL EXAM     INITIAL VITALS:  height is 5' 3\" (1.6 m) and weight is 228 lb (103.4 kg). His oral temperature is 97.5 °F (36.4 °C). His blood pressure is 150/57 (abnormal) and his pulse is 99. His respiration is 16 and oxygen saturation is 98%. Physical Exam  Vitals signs and nursing note reviewed. Constitutional:       General: He is not in acute distress. Appearance: He is well-developed. He is not diaphoretic. HENT:      Head: Normocephalic and atraumatic. Right Ear: External ear normal.      Left Ear: External ear normal.      Nose: Nose normal.   Eyes:      General: Lids are normal. No scleral icterus. Right eye: No discharge. Left eye: No discharge. Conjunctiva/sclera: Conjunctivae normal.      Right eye: No exudate. Left eye: No exudate. Pupils: Pupils are equal, round, and reactive to light. Neck:      Musculoskeletal: Normal range of motion and neck supple. Normal range of motion. Thyroid: No thyromegaly. Vascular: No JVD. Trachea: No tracheal deviation. Cardiovascular:      Rate and Rhythm: Normal rate and regular rhythm. Pulses: Normal pulses. Heart sounds: Normal heart sounds, S1 normal and S2 normal. No murmur. No friction rub. No gallop. Pulmonary:      Effort: Pulmonary effort is normal. No respiratory distress. Breath sounds: Normal breath sounds. No stridor. No wheezing or rales. Chest:      Chest wall: No tenderness. Abdominal:      General: Abdomen is protuberant. Bowel sounds are decreased. There is no distension or abdominal bruit.  There are no signs of injury. Palpations: Abdomen is soft. There is no mass. Tenderness: There is generalized abdominal tenderness. There is no guarding or rebound. Negative signs include Bernardo's sign, Rovsing's sign, McBurney's sign and psoas sign. Hernia: No hernia is present. Musculoskeletal: Normal range of motion. General: No tenderness. Right shoulder: He exhibits no tenderness, no bony tenderness, no crepitus and normal strength. Lymphadenopathy:      Cervical: No cervical adenopathy. Skin:     General: Skin is warm and dry. Findings: No bruising, ecchymosis, lesion or rash. Neurological:      Mental Status: He is alert and oriented to person, place, and time. Cranial Nerves: No cranial nerve deficit. Sensory: No sensory deficit. Coordination: Coordination normal.      Deep Tendon Reflexes: Reflexes are normal and symmetric. Psychiatric:         Speech: Speech normal.         Behavior: Behavior normal.         Thought Content: Thought content normal.         Judgment: Judgment normal.           DIFFERENTIAL DIAGNOSIS:   Differential diagnosis discussed extensively at bedside with the patient including but not limited to gastritis gastroenteritis diabetic gastroparesis less likely small bowel obstruction. DIAGNOSTIC RESULTS     EKG: All EKG's are interpreted by the Emergency Department Physician who either signs or Co-signs this chart in the absence of a cardiologist.  EKG revealed sinus tachycardia, ventricular rate of 103 FL interval 156 QRS duration of 80 QT interval 370 QTc of 44. When compared with EKG dated March 3, 2020 changes appreciated. RADIOLOGY: non-plain film images(s) such as CT, Ultrasound and MRI are read by the radiologist.  CT ABDOMEN PELVIS W IV CONTRAST Additional Contrast? None   Final Result   Fluid in the proximal colon suggestive of a diarrheal illness. Hepatic steatosis. Small gallstones in the gallbladder.  No findings to suggest acute Patient has poorly controlled diabetes. This most likely represents gastroparesis. Patient had no bouts of emesis while he was here. Patient remained stable throughout course. Labs were reassuring. CT scan showed no acute intra-abdominal processes. At this point I went back to bedside reexamined the patient is feeling much better. I told him the importance of maintaining good blood sugars. I discussed with him the probability that this may be a gastroparesis. He will be given medications for this at home as well as medications for pain. He is instructed to follow-up with his gastroenterologist and call today for an appointment. Patient understood and agreed with the plan. Patient is subsequently discharged home in improved condition. Patient has gastritis with probable gastroparesis because of uncontrolled diabetes. Patient is instructed to keep his diabetes under control. He has been given medications for gastroparesis instructed to take those as prescribed. He is instructed to follow-up with his gastroenterologist and call for an appointment within the next 1 to 2 days for follow-up. Patient is instructed to return to the nearest emergency room immediately for any new or worsening complaints. CRITICAL CARE:   None    CONSULTS:  None    PROCEDURES:  None    FINAL IMPRESSION      1.  Gastroparesis          DISPOSITION/PLAN   Discharge    PATIENT REFERRED TO:  Milly Peralta, MARILYN - CNP  [address] [facility] [phone]    Call in 1 day      [address]    Call in 1 day        DISCHARGE MEDICATIONS:  Discharge Medication List as of 3/8/2020 11:53 PM      START taking these medications    Details   metoclopramide (REGLAN) 10 MG tablet Take 1 tablet by mouth 4 times daily, Disp-120 tablet, R-0Print      dicyclomine (BENTYL) 10 MG capsule Take 1 capsule by mouth 4 times daily, Disp-360 capsule, R-1Print      traMADol

## 2020-03-10 ENCOUNTER — CARE COORDINATION (OUTPATIENT)
Dept: CARE COORDINATION | Age: 50
End: 2020-03-10

## 2020-03-10 ENCOUNTER — OFFICE VISIT (OUTPATIENT)
Dept: INTERNAL MEDICINE CLINIC | Age: 50
End: 2020-03-10
Payer: MEDICARE

## 2020-03-10 ENCOUNTER — NURSE ONLY (OUTPATIENT)
Dept: LAB | Age: 50
End: 2020-03-10

## 2020-03-10 ENCOUNTER — HOSPITAL ENCOUNTER (EMERGENCY)
Age: 50
Discharge: HOME | End: 2020-03-10
Payer: MEDICARE

## 2020-03-10 ENCOUNTER — CARE COORDINATION (OUTPATIENT)
Dept: CASE MANAGEMENT | Age: 50
End: 2020-03-10

## 2020-03-10 ENCOUNTER — HOSPITAL ENCOUNTER (OUTPATIENT)
Dept: PHYSICAL THERAPY | Age: 50
Setting detail: THERAPIES SERIES
Discharge: HOME | End: 2020-03-10
Payer: MEDICARE

## 2020-03-10 VITALS
HEART RATE: 82 BPM | RESPIRATION RATE: 17 BRPM | DIASTOLIC BLOOD PRESSURE: 110 MMHG | WEIGHT: 228 LBS | BODY MASS INDEX: 36.64 KG/M2 | SYSTOLIC BLOOD PRESSURE: 158 MMHG | TEMPERATURE: 98.4 F | HEIGHT: 66 IN | OXYGEN SATURATION: 100 %

## 2020-03-10 VITALS — SYSTOLIC BLOOD PRESSURE: 170 MMHG | DIASTOLIC BLOOD PRESSURE: 80 MMHG | HEART RATE: 86 BPM

## 2020-03-10 LAB
ABSOLUTE RETIC #: 139 THOU/MM3 (ref 20–115)
ALBUMIN SERPL-MCNC: 4.2 G/DL (ref 3.5–5.1)
ALP BLD-CCNC: 74 U/L (ref 38–126)
ALT SERPL-CCNC: 24 U/L (ref 11–66)
ANION GAP SERPL CALCULATED.3IONS-SCNC: 16 MEQ/L (ref 8–16)
AST SERPL-CCNC: 29 U/L (ref 5–40)
BASOPHILS # BLD: 0.6 %
BASOPHILS ABSOLUTE: 0.1 THOU/MM3 (ref 0–0.1)
BILIRUB SERPL-MCNC: 0.4 MG/DL (ref 0.3–1.2)
BUN BLDV-MCNC: 10 MG/DL (ref 7–22)
CALCIUM SERPL-MCNC: 9.6 MG/DL (ref 8.5–10.5)
CHLORIDE BLD-SCNC: 94 MEQ/L (ref 98–111)
CO2: 25 MEQ/L (ref 23–33)
CREAT SERPL-MCNC: 0.7 MG/DL (ref 0.4–1.2)
EOSINOPHIL # BLD: 4.2 %
EOSINOPHILS ABSOLUTE: 0.5 THOU/MM3 (ref 0–0.4)
ERYTHROCYTE [DISTWIDTH] IN BLOOD BY AUTOMATED COUNT: 13.9 % (ref 11.5–14.5)
ERYTHROCYTE [DISTWIDTH] IN BLOOD BY AUTOMATED COUNT: 43.9 FL (ref 35–45)
GFR SERPL CREATININE-BSD FRML MDRD: > 90 ML/MIN/1.73M2
GLUCOSE BLD-MCNC: 222 MG/DL (ref 70–108)
HCT VFR BLD CALC: 44.9 % (ref 42–52)
HEMOGLOBIN: 15.3 GM/DL (ref 14–18)
IMMATURE GRANS (ABS): 0.11 THOU/MM3 (ref 0–0.07)
IMMATURE GRANULOCYTES: 1 %
IMMATURE RETIC FRACT: 11.1 % (ref 2.3–13.4)
LYMPHOCYTES # BLD: 22.8 %
LYMPHOCYTES ABSOLUTE: 2.6 THOU/MM3 (ref 1–4.8)
MCH RBC QN AUTO: 29.6 PG (ref 26–33)
MCHC RBC AUTO-ENTMCNC: 34.1 GM/DL (ref 32.2–35.5)
MCV RBC AUTO: 86.8 FL (ref 80–94)
MONOCYTES # BLD: 7 %
MONOCYTES ABSOLUTE: 0.8 THOU/MM3 (ref 0.4–1.3)
NUCLEATED RED BLOOD CELLS: 0 /100 WBC
PLATELET # BLD: 242 THOU/MM3 (ref 130–400)
PMV BLD AUTO: 9.6 FL (ref 9.4–12.4)
POTASSIUM SERPL-SCNC: 4.3 MEQ/L (ref 3.5–5.2)
RBC # BLD: 5.17 MILL/MM3 (ref 4.7–6.1)
RETIC HEMOGLOBIN: 34.5 PG (ref 28.2–35.7)
RETICULOCYTE ABSOLUTE COUNT: 2.7 % (ref 0.5–2)
SEG NEUTROPHILS: 64.4 %
SEGMENTED NEUTROPHILS ABSOLUTE COUNT: 7.4 THOU/MM3 (ref 1.8–7.7)
SODIUM BLD-SCNC: 135 MEQ/L (ref 135–145)
TOTAL PROTEIN: 8.3 G/DL (ref 6.1–8)
WBC # BLD: 11.5 THOU/MM3 (ref 4.8–10.8)

## 2020-03-10 PROCEDURE — 99495 TRANSJ CARE MGMT MOD F2F 14D: CPT | Performed by: NURSE PRACTITIONER

## 2020-03-10 PROCEDURE — 1111F DSCHRG MED/CURRENT MED MERGE: CPT | Performed by: NURSE PRACTITIONER

## 2020-03-10 PROCEDURE — 97110 THERAPEUTIC EXERCISES: CPT

## 2020-03-10 PROCEDURE — 99282 EMERGENCY DEPT VISIT SF MDM: CPT

## 2020-03-10 ASSESSMENT — ENCOUNTER SYMPTOMS
EYE ITCHING: 0
BLOOD IN STOOL: 0
SORE THROAT: 0
CHEST TIGHTNESS: 0
NAUSEA: 1
RHINORRHEA: 0
COUGH: 0
WHEEZING: 0
ABDOMINAL DISTENTION: 1
RECTAL PAIN: 0
VOMITING: 1
ANAL BLEEDING: 0
DIARRHEA: 1
DIARRHEA: 0
CONSTIPATION: 0
BLOOD IN STOOL: 0
COUGH: 1
ABDOMINAL PAIN: 1
SORE THROAT: 0
ABDOMINAL PAIN: 1
VOMITING: 1
ABDOMINAL DISTENTION: 1
CONSTIPATION: 0
CHOKING: 0
TROUBLE SWALLOWING: 0
SHORTNESS OF BREATH: 1
NAUSEA: 1
SHORTNESS OF BREATH: 0
BACK PAIN: 0
ANAL BLEEDING: 0
APNEA: 0

## 2020-03-10 ASSESSMENT — PAIN DESCRIPTION - LOCATION
LOCATION: ANKLE;ABDOMEN
LOCATION: ABDOMEN

## 2020-03-10 ASSESSMENT — PAIN DESCRIPTION - ORIENTATION
ORIENTATION: LEFT
ORIENTATION: MID

## 2020-03-10 ASSESSMENT — PAIN SCALES - GENERAL
PAINLEVEL_OUTOF10: 8
PAINLEVEL_OUTOF10: 7

## 2020-03-10 ASSESSMENT — PAIN DESCRIPTION - PAIN TYPE
TYPE: CHRONIC PAIN
TYPE: ACUTE PAIN

## 2020-03-10 NOTE — CARE COORDINATION
Pt called back. Pt states \"I'm not worrying about it. They should just admit me and get it done if they are worried about it or they should have done it when I was in the hospital before. \"   Encouraged pt to get it done as scheduled. Advised that if unable to get prep he will need to reschedule. Verbalizes understanding. Reinforced to pt that GI office is providing prep to him free of charge all he has to do is pick it up or have someone pick it up for him. Updated clinical staff.

## 2020-03-10 NOTE — PROGRESS NOTES
weeks  Specific instructions for Next Treatment: instruction donning /doffing prosthesis, static stance in // bars for balance, gait training initiate in // bars and progress to roller walker when able, Progress as able. UE UBE for endurance, USE OT [employer]. UE strengthening program as well update. Current Treatment Recommendations: Strengthening, Balance Training, Home Exercise Program, Functional Mobility Training, Transfer Training, Gait Training, Endurance Training, Patient/Caregiver Education & Training  Plan Comment: Continue with current POC    Goals:  Patient goals : To walk with prosthesis and be more mobile with resuming activities during day as before. Short term goals  Time Frame for Short term goals: 4 weeks   Short term goal 1: Patient to demonstrate increased balance with prosthetic Right Above knee prosthesis with ability to stand without support of // bars in stance for 2 minutes. Short term goal 2: Patient to demonstrate ambulation in // bars with single UE support while wearing RIght AK prosthesis for 20 feet ambulation and SBA of one person. Short term goal 3: Patient to have noted improved endurance with monitored BP with ability to use UE ergometer for 3 minutes. Short term goal 4: Patient to demonstrate sit <> stands with right AK prosthesis with SBA of one person with light assit of UE on armrests of w/c. Long term goals  Time Frame for Long term goals : 12 weeks  Long term goal 1: Patient to ambulate with roller walker wearing right AK prosthesis with equal step length and stance time through RLE/LLE with CGA of one person for 50 feet. Long term goal 2: Patient  to demonstrate ability to negotiate single curb 4 inches in height with right AK prostheis and CGA of one person. Long term goal 3: Patient to demonstrate increased static standing balance with ability to maintain for 5-10 minutes while completing functional activity with UEs.    Long term goal 4: Patient to demonstrate independence in [facility] for strengthening, balance in order to improve gait with right AK prosthesis.      David Chinchilla, [address]

## 2020-03-10 NOTE — CARE COORDINATION
Pt called stating that he would like to talk with PCP. Pt states that he just ate a bologna sandwich short while ago and vomited up what he ate and noted a small amt of blood. Pt reports that he did not have anything red. Drank water with his sandwich. States that the amt of blood was appx 50 cent piece sized amt. Now reporting abd pain. States that pain occurs with eating and drinking. I again expressed to pt that he needs to have the colonoscopy. Pt states \"well my insurance won't pay for the prep so what am I supposed to do? \"  I explained to pt again as I did earlier that GI office was going to supply pt prep for procedure all he needed to do was go or have someone go to office to pick it up. Pt states \"well I cancelled my colonoscopy for  tomorrow. \"  Advised pt that he needs to reschedule this as soon as possible. Informed him that I would send PCP message as requested.

## 2020-03-10 NOTE — PROGRESS NOTES
Post-Discharge Transitional Care Management Services or Hospital Follow Up      Meg Browinng   YOB: 1970    Date of Office Visit:  3/10/2020  Date of Hospital Admission: 3/10/20  Date of Hospital Discharge: 3/10/20  Readmission Risk Score(high >=14%. Medium >=10%):Readmission Risk Score: 36      Care management risk score Rising risk (score 2-5) and Complex Care (Scores >=6): 14     Non face to face  following discharge, date last encounter closed (first attempt may have been earlier): 3/7/2020 12:29 PM 3/7/2020 12:29 PM    Call initiated 2 business days of discharge: Yes     Patient Active Problem List   Diagnosis    Cellulitis    Status post below knee amputation of right lower extremity ([facility]    Gait disturbance    Sebaceous cyst    Uncontrolled type 2 diabetes mellitus with hyperglycemia, with long-term current use of insulin ([facility])    Severe bipolar II disorder, recent episode major depressive, remission ([address]    Bipolar 1 disorder (HCC)    Leukocytosis    Lactic acidosis    Hyponatremia    Normocytic anemia    Obesity (BMI 30-39. 9)    History of noncompliance with medical treatment    Essential hypertension    Tobacco dependence    Gastroesophageal reflux disease without esophagitis    History of marijuana use    Physical debility    Anemia    Electrolyte imbalance    Weakness    Infection of above knee amputation stump of right leg (HCC)    Above knee amputation of right lower extremity (HCC)    Sepsis ([facility])    Vitamin D deficiency    COPD exacerbation (HCC)    Influenza B    Depression, recurrent (HCC)    Major depressive disorder, recurrent (HCC)    GI bleed       Allergies   Allergen Reactions    Pcn [Penicillins] Shortness Of Breath, Nausea And Vomiting and Other (See Comments)     Does not remember reactions. Has TOLERATED amoxicillin and several different cephalosporins.      Clindamycin/Lincomycin Itching       Medications listed as ordered at the time of discharge from [address] Medication Instructions TRICE:    Printed on:03/11/20 1010   Medication Information                      AMINO ACIDS COMPLEX PO  Take 1 each by mouth daily Daily AA drink             amLODIPine (NORVASC) 2.5 MG tablet  Take 1 tablet by mouth daily             blood glucose monitor kit and supplies  Accu Chek Sheila meter.  Use to test blood sugars DX:E11.65             blood glucose monitor strips  Accucheck Sheila Test Strips Test blood sugars 4 times daily DX:E11.65             dicyclomine (BENTYL) 10 MG capsule  Take 1 capsule by mouth 4 times daily             gabapentin (NEURONTIN) 300 MG capsule  TAKE 1 CAPSULE BY MOUTH 3 TIMES A DAY             insulin aspart (NOVOLOG FLEXPEN) 100 UNIT/ML injection pen  Inject 20 Units into the skin 3 times daily (before meals)             insulin detemir (LEVEMIR FLEXTOUCH) 100 UNIT/ML injection pen  Inject 80 Units into the skin 2 times daily             Insulin Syringe-Needle U-100 29G X 1/2\" 0.3 ML MISC  1 each by Does not apply route 4 times daily (after meals and at bedtime)             Lancets MISC  Use to test blood sugars 4 times daily DX:E11.65             linagliptin (TRADJENTA) 5 MG tablet  Take 1 tablet by mouth daily             metoclopramide (REGLAN) 10 MG tablet  Take 1 tablet by mouth 4 times daily             pantoprazole (PROTONIX) 40 MG tablet  Take 1 tablet by mouth 2 times daily (before meals)             pioglitazone (ACTOS) 30 MG tablet  Take 1 tablet by mouth daily             polyethylene glycol (GLYCOLAX) packet  Take 17 g by mouth daily as needed for Constipation             sertraline (ZOLOFT) 50 MG tablet  Take 1 tablet by mouth daily             sucralfate (CARAFATE) 1 GM tablet  Take 1 tablet by mouth 4 times daily (before meals and nightly)                   Medications marked \"taking\" at this time  Outpatient Medications Marked as Taking for the 3/10/20 encounter (Office Visit) with Amanda Jauregui Annmarie, APRN - CNP   Medication Sig Dispense Refill    metoclopramide (REGLAN) 10 MG tablet Take 1 tablet by mouth 4 times daily 120 tablet 0    dicyclomine (BENTYL) 10 MG capsule Take 1 capsule by mouth 4 times daily 360 capsule 1    [] traMADol (ULTRAM) 50 MG tablet Take 1 tablet by mouth every 8 hours as needed for Pain for up to 2 days. Intended supply: 3 days. Take lowest dose possible to manage pain 6 tablet 0    polyethylene glycol (GLYCOLAX) packet Take 17 g by mouth daily as needed for Constipation 527 g 0    pantoprazole (PROTONIX) 40 MG tablet Take 1 tablet by mouth 2 times daily (before meals) 60 tablet 0    sucralfate (CARAFATE) 1 GM tablet Take 1 tablet by mouth 4 times daily (before meals and nightly) 120 tablet 0    linagliptin (TRADJENTA) 5 MG tablet Take 1 tablet by mouth daily 30 tablet 5    amLODIPine (NORVASC) 2.5 MG tablet Take 1 tablet by mouth daily 30 tablet 3    sertraline (ZOLOFT) 50 MG tablet Take 1 tablet by mouth daily 30 tablet 5    Lancets MISC Use to test blood sugars 4 times daily DX:E11.65 200 each 0    insulin aspart (NOVOLOG FLEXPEN) 100 UNIT/ML injection pen Inject 20 Units into the skin 3 times daily (before meals) 5 pen 3    insulin detemir (LEVEMIR FLEXTOUCH) 100 UNIT/ML injection pen Inject 80 Units into the skin 2 times daily 30 pen 5    pioglitazone (ACTOS) 30 MG tablet Take 1 tablet by mouth daily 30 tablet 2    gabapentin (NEURONTIN) 300 MG capsule TAKE 1 CAPSULE BY MOUTH 3 TIMES A DAY 90 capsule 1    AMINO ACIDS COMPLEX PO Take 1 each by mouth daily Daily AA drink      blood glucose monitor kit and supplies Accu Chek Sheila meter.  Use to test blood sugars DX:E11.65 1 kit 0    blood glucose monitor strips Accucheck Sheila Test Strips Test blood sugars 4 times daily DX:E11.65 400 strip 5    Insulin Syringe-Needle U-100 29G X 1/2\" 0.3 ML MISC 1 each by Does not apply route 4 times daily (after meals and at bedtime) 200 each 0        Medications pancreatic ductal dilatation. No findings to suggest acute pancreatitis.       Adrenal glands: Unremarkable. No mass.       Kidneys and ureters: Unremarkable. No hydroureteronephrosis, mass, or cyst. No renal or ureteral calculi. No findings to suggest acute pyelonephritis.       Stomach, small bowel, and colon: There is a moderate amount of food in the stomach. .   There is fluid in the cecum and ascending and proximal transverse colon suggestive of a diarrheal illness. Small bowel and colon are otherwise unremarkable. No bowel wall thickening or bowel obstruction.       Appendix: Unremarkable. No findings to suggest acute appendicitis.        Omentum and mesentery: Unremarkable       Aorta, vascular: No aortic aneurysm or dissection. No significant vascular abnormality.       Reproductive: There is nonspecific enlargement of the prostate gland, correlate clinically.       Bladder: The wall of the urinary bladder is again thickened, which may be due to a combination of incomplete distension and partial outflow obstruction due to the enlarged prostate. While there is no stranding of the adjacent fat, acute or chronic    cystitis cannot be excluded. Correlate clinically. No calcified stones.       Intraperitoneal/retroperitoneal Space: There is no ascites, abscess, adenopathy, or mass. No pneumoperitoneum.       Abdominal and pelvic body wall soft tissues: There is a tiny fat-containing umbilical hernia.       Musculoskeletal structures: There are degenerative changes of the thoracolumbar spine.           Impression   Fluid in the proximal colon suggestive of a diarrheal illness. Hepatic steatosis. Small gallstones in the gallbladder. No findings to suggest acute cholecystitis or biliary dilatation. Persistent bladder wall thickening, correlate for acute or chronic cystitis. Enlarged prostate, correlate clinically.       **This report has been created using voice recognition software.  It may contain minor 4. Bloating - Stop Trajdenta to see if this is improving  Continue Bentyl and Reglan as advised.  Maybe DM gastroparesis        Medical Decision Making: high complexity

## 2020-03-10 NOTE — CARE COORDINATION
Provider asking about pt's blister packs. Pt is being seen in office today and states that he is out of medications but doesn't have any money to afford to get his blister packed medications. Pt thinks medication cost is appx $40.    [address] [facility] pharmacy. Spoke with Lili. She states that medications were mailed out to pt yesterday. PCP notified.

## 2020-03-10 NOTE — PATIENT INSTRUCTIONS
Stop Saul Gao. Continue Carafate, Protonix    Dr. Delgado Gathers outpatient colonoscopy tomorrow as planned, can get prep from his office.

## 2020-03-11 ENCOUNTER — HOSPITAL ENCOUNTER (EMERGENCY)
Age: 50
Discharge: HOME | End: 2020-03-11
Attending: EMERGENCY MEDICINE
Payer: MEDICARE

## 2020-03-11 ENCOUNTER — CARE COORDINATION (OUTPATIENT)
Dept: INTERNAL MEDICINE CLINIC | Age: 50
End: 2020-03-11

## 2020-03-11 ENCOUNTER — CARE COORDINATION (OUTPATIENT)
Dept: CARE COORDINATION | Age: 50
End: 2020-03-11

## 2020-03-11 VITALS
BODY MASS INDEX: 36.64 KG/M2 | RESPIRATION RATE: 16 BRPM | HEIGHT: 66 IN | TEMPERATURE: 97.3 F | HEART RATE: 93 BPM | SYSTOLIC BLOOD PRESSURE: 131 MMHG | WEIGHT: 228 LBS | DIASTOLIC BLOOD PRESSURE: 81 MMHG | OXYGEN SATURATION: 97 %

## 2020-03-11 LAB
ALBUMIN SERPL-MCNC: 4.2 G/DL (ref 3.5–5.1)
ALP BLD-CCNC: 72 U/L (ref 38–126)
ALT SERPL-CCNC: 22 U/L (ref 11–66)
ANION GAP SERPL CALCULATED.3IONS-SCNC: 14 MEQ/L (ref 8–16)
AST SERPL-CCNC: 23 U/L (ref 5–40)
BASOPHILS # BLD: 0.7 %
BASOPHILS ABSOLUTE: 0.1 THOU/MM3 (ref 0–0.1)
BILIRUB SERPL-MCNC: 0.3 MG/DL (ref 0.3–1.2)
BUN BLDV-MCNC: 14 MG/DL (ref 7–22)
CALCIUM SERPL-MCNC: 9.5 MG/DL (ref 8.5–10.5)
CHLORIDE BLD-SCNC: 96 MEQ/L (ref 98–111)
CO2: 23 MEQ/L (ref 23–33)
CREAT SERPL-MCNC: 0.8 MG/DL (ref 0.4–1.2)
EOSINOPHIL # BLD: 5.1 %
EOSINOPHILS ABSOLUTE: 0.6 THOU/MM3 (ref 0–0.4)
ERYTHROCYTE [DISTWIDTH] IN BLOOD BY AUTOMATED COUNT: 13.8 % (ref 11.5–14.5)
ERYTHROCYTE [DISTWIDTH] IN BLOOD BY AUTOMATED COUNT: 40.6 FL (ref 35–45)
GFR SERPL CREATININE-BSD FRML MDRD: > 90 ML/MIN/1.73M2
GLUCOSE BLD-MCNC: 285 MG/DL (ref 70–108)
HCT VFR BLD CALC: 42.1 % (ref 42–52)
HEMOCCULT STL QL: NEGATIVE
HEMOGLOBIN: 15.3 GM/DL (ref 14–18)
IMMATURE GRANS (ABS): 0.09 THOU/MM3 (ref 0–0.07)
IMMATURE GRANULOCYTES: 0.8 %
LIPASE: 145.4 U/L (ref 5.6–51.3)
LYMPHOCYTES # BLD: 23.5 %
LYMPHOCYTES ABSOLUTE: 2.7 THOU/MM3 (ref 1–4.8)
MCH RBC QN AUTO: 30.2 PG (ref 26–33)
MCHC RBC AUTO-ENTMCNC: 36.3 GM/DL (ref 32.2–35.5)
MCV RBC AUTO: 83.2 FL (ref 80–94)
MONOCYTES # BLD: 7.4 %
MONOCYTES ABSOLUTE: 0.9 THOU/MM3 (ref 0.4–1.3)
NUCLEATED RED BLOOD CELLS: 0 /100 WBC
OSMOLALITY CALCULATION: 277.2 MOSMOL/KG (ref 275–300)
PLATELET # BLD: 265 THOU/MM3 (ref 130–400)
PMV BLD AUTO: 9.6 FL (ref 9.4–12.4)
POTASSIUM SERPL-SCNC: 4.1 MEQ/L (ref 3.5–5.2)
RBC # BLD: 5.06 MILL/MM3 (ref 4.7–6.1)
SEG NEUTROPHILS: 62.5 %
SEGMENTED NEUTROPHILS ABSOLUTE COUNT: 7.3 THOU/MM3 (ref 1.8–7.7)
SODIUM BLD-SCNC: 133 MEQ/L (ref 135–145)
TOTAL PROTEIN: 7.8 G/DL (ref 6.1–8)
TROPONIN T: < 0.01 NG/ML
WBC # BLD: 11.7 THOU/MM3 (ref 4.8–10.8)

## 2020-03-11 PROCEDURE — 99285 EMERGENCY DEPT VISIT HI MDM: CPT

## 2020-03-11 PROCEDURE — 36415 COLL VENOUS BLD VENIPUNCTURE: CPT

## 2020-03-11 PROCEDURE — 83690 ASSAY OF LIPASE: CPT

## 2020-03-11 PROCEDURE — 82272 OCCULT BLD FECES 1-3 TESTS: CPT

## 2020-03-11 PROCEDURE — 84484 ASSAY OF TROPONIN QUANT: CPT

## 2020-03-11 PROCEDURE — 80053 COMPREHEN METABOLIC PANEL: CPT

## 2020-03-11 PROCEDURE — 6370000000 HC RX 637 (ALT 250 FOR IP): Performed by: EMERGENCY MEDICINE

## 2020-03-11 PROCEDURE — 85025 COMPLETE CBC W/AUTO DIFF WBC: CPT

## 2020-03-11 RX ORDER — DICYCLOMINE HYDROCHLORIDE 10 MG/1
10 CAPSULE ORAL 4 TIMES DAILY
Qty: 360 CAPSULE | Refills: 1 | Status: SHIPPED | OUTPATIENT
Start: 2020-03-11 | End: 2020-05-26 | Stop reason: SDUPTHER

## 2020-03-11 RX ORDER — SUCRALFATE 1 G/1
1 TABLET ORAL ONCE
Status: COMPLETED | OUTPATIENT
Start: 2020-03-11 | End: 2020-03-11

## 2020-03-11 RX ORDER — DICYCLOMINE HYDROCHLORIDE 10 MG/1
10 CAPSULE ORAL ONCE
Status: COMPLETED | OUTPATIENT
Start: 2020-03-11 | End: 2020-03-11

## 2020-03-11 RX ORDER — METOCLOPRAMIDE 10 MG/1
10 TABLET ORAL 4 TIMES DAILY
Qty: 120 TABLET | Refills: 0 | Status: SHIPPED | OUTPATIENT
Start: 2020-03-11 | End: 2020-05-21 | Stop reason: ALTCHOICE

## 2020-03-11 RX ADMIN — DICYCLOMINE HYDROCHLORIDE 10 MG: 10 CAPSULE ORAL at 18:07

## 2020-03-11 RX ADMIN — SUCRALFATE 1 G: 1 TABLET ORAL at 18:07

## 2020-03-11 ASSESSMENT — PAIN DESCRIPTION - ORIENTATION: ORIENTATION: LOWER

## 2020-03-11 ASSESSMENT — PAIN DESCRIPTION - LOCATION: LOCATION: ABDOMEN

## 2020-03-11 ASSESSMENT — PAIN DESCRIPTION - DESCRIPTORS: DESCRIPTORS: ACHING

## 2020-03-11 ASSESSMENT — PAIN DESCRIPTION - FREQUENCY: FREQUENCY: CONTINUOUS

## 2020-03-11 ASSESSMENT — PAIN SCALES - GENERAL: PAINLEVEL_OUTOF10: 8

## 2020-03-11 ASSESSMENT — PAIN DESCRIPTION - PAIN TYPE: TYPE: ACUTE PAIN

## 2020-03-11 NOTE — CARE COORDINATION
Tried to call brenda, he didn't answer. I noted in the ED record from last night, that he didn't  bentyl, reglan, which would treat his bloating and abdominal pain, did he ever get the carafate and protonix that were ordered? This will help his pain. I told him when he was here yesterday we could trial stopping the Tradjenta, to see if that could be a cause, however, I don't want his glucose to get out of control, so I would need glucose logs to monitor his sugars.

## 2020-03-11 NOTE — ED NOTES
ED nurse-to-nurse bedside report    Chief Complaint   Patient presents with    Rectal Bleeding    Abdominal Pain      LOC: alert and orientated to name, place, date  Vital signs   Vitals:    03/11/20 1622 03/11/20 1710 03/11/20 1808 03/11/20 1846   BP: (!) 151/86 (!) 155/77 124/77 131/81   Pulse: 96 93 88 93   Resp: 14 16 16 16   Temp: 97.3 °F (36.3 °C)      TempSrc: Oral      SpO2: 98% 96% 98% 97%   Weight: 228 lb (103.4 kg)      Height: 5' 6\" (1.676 m)         Pain:    Pain Interventions: Carafate and Bentyl  Pain Goal:   Oxygen: NA    Current needs required something for nausea   Telemetry: Yes  LDAs:    Continuous Infusions:   Mobility: Independent  Everardo Fall Risk Score:    Fall Risk 6/4/2019   2 or more falls in past year? no   Fall with injury in past year? no     Fall Interventions:   Report given to: Debo Esteban RN  03/11/20 7511

## 2020-03-11 NOTE — ED TRIAGE NOTES
Pt comes to the ED with multiple days of abd pain and rectal bleeding. Pt state his stools are black. Pt has been here multiple times this month. Per encounter hx pt hasn't filled his rx. Pt called GI to schedule colonoscopy and GI told him to come here.

## 2020-03-11 NOTE — ED NOTES
Upon first contact with patient this RN receives bedside shift report Gabriela Mayorga RN.        Andreas Norris RN  03/11/20 1934

## 2020-03-11 NOTE — ED NOTES
Presents to ER with complaints of vomiting up blood and abdominal pain. Pt states he was seen yesterday and is still having the same symptoms. States he needs a colonoscopy but his insurance will not cover \"the stuff I gotta drink. \" Pt states he just needs to see a GI doctor. RN explained to pt GI doctors are not in the ER. Pt states \"Then the doctor just needs to figure this out cause I'm tired of it. \" Respirations unlabored. Call light in reach.      Ladon Schirmer, RN  03/10/20 2005

## 2020-03-11 NOTE — ED PROVIDER NOTES
Negative for abdominal distention, constipation and diarrhea. Endocrine: Negative for cold intolerance, heat intolerance, polydipsia and polyphagia. Genitourinary: Negative for dysuria, flank pain, frequency and hematuria. Musculoskeletal: Negative for arthralgias, back pain, gait problem, myalgias, neck pain and neck stiffness. Skin: Negative for pallor, rash and wound. Allergic/Immunologic: Negative for environmental allergies and food allergies. Neurological: Negative for dizziness, tremors, syncope, weakness and headaches. Psychiatric/Behavioral: Negative for agitation, behavioral problems, confusion, self-injury, sleep disturbance and suicidal ideas. PAST MEDICAL HISTORY    has a past medical history of Bipolar 2 disorder ([address] Diabetes mellitus type 2, uncontrolled ([address] Diabetic polyneuropathy ([address] Diabetic ulcer of right foot associated with type 2 diabetes mellitus ([address] Essential hypertension, GERD (gastroesophageal reflux disease), Hammer toe of left foot, Heart murmur, History of tobacco abuse, Hx of AKA (above knee amputation), right ([address] Macular edema, diabetic, bilateral ([address] Marijuana abuse in remission, Onychomycosis, Other disorders of kidney and ureter in diseases classified elsewhere, Sleep apnea, and 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 SURGICAL HISTORY      has a past surgical history that includes other surgical history (Right, 1/14/15); Abscess Drainage (Right); Toe amputation (Right, 1/16/15); Foot Debridement (Right, 07/01/2016); Leg amputation below knee (Right, 07/20/2016); [place] tooth extraction (?when); pr drain infect shoulder bursa (Left, 8/18/2017); pr office/outpt visit,procedure only (Right, 9/20/2018); incision and drainage (Right, 2/18/2019); Leg amputation below knee (Right, 4/4/2019); AMPUTATION ABOVE KNEE (Right, 4/18/2019); and Upper gastrointestinal endoscopy (N/A, 3/3/2020).     CURRENT MEDICATIONS Discharge Medication List as of 3/11/2020  7:37 PM      CONTINUE these medications which have NOT CHANGED    Details   dicyclomine (BENTYL) 10 MG capsule Take 1 capsule by mouth 4 times daily, Disp-360 capsule, R-1Normal      metoclopramide (REGLAN) 10 MG tablet Take 1 tablet by mouth 4 times daily, Disp-120 tablet, R-0Normal      polyethylene glycol (GLYCOLAX) packet Take 17 g by mouth daily as needed for Constipation, Disp-527 g, R-0Normal      pantoprazole (PROTONIX) 40 MG tablet Take 1 tablet by mouth 2 times daily (before meals), Disp-60 tablet, R-0Normal      sucralfate (CARAFATE) 1 GM tablet Take 1 tablet by mouth 4 times daily (before meals and nightly), Disp-120 tablet, R-0Normal      linagliptin (TRADJENTA) 5 MG tablet Take 1 tablet by mouth daily, Disp-30 tablet, R-5Normal      amLODIPine (NORVASC) 2.5 MG tablet Take 1 tablet by mouth daily, Disp-30 tablet, R-3Normal      sertraline (ZOLOFT) 50 MG tablet Take 1 tablet by mouth daily, Disp-30 tablet, R-5Normal      Lancets MISC Disp-200 each, R-0, NormalUse to test blood sugars 4 times daily DX:E11.65      insulin aspart (NOVOLOG FLEXPEN) 100 UNIT/ML injection pen Inject 20 Units into the skin 3 times daily (before meals), Disp-5 pen, R-3Normal      insulin detemir (LEVEMIR FLEXTOUCH) 100 UNIT/ML injection pen Inject 80 Units into the skin 2 times daily, Disp-30 pen, R-5Normal      pioglitazone (ACTOS) 30 MG tablet Take 1 tablet by mouth daily, Disp-30 tablet, R-2Normal      gabapentin (NEURONTIN) 300 MG capsule TAKE 1 CAPSULE BY MOUTH 3 TIMES A DAY, Disp-90 capsule, R-1Normal      AMINO ACIDS COMPLEX PO Take 1 each by mouth daily Daily AA drinkHistorical Med      blood glucose monitor kit and supplies Accu Chek Sheila meter.  Use to test blood sugars DX:E11.65, Disp-1 kit, R-0, Normal      blood glucose monitor strips Accucheck Sheila Test Strips Test blood sugars 4 times daily DX:E11.65, Disp-400 strip, R-5, Normal      Insulin Syringe-Needle U-100 29G X 1/2\" 0.3 ML MISC 4 TIMES DAILY AFTER MEALS AND BEFORE BEDTIME Starting 2019, Disp-200 each, R-0, Normal             ALLERGIES     is allergic to pcn [penicillins] and clindamycin/lincomycin. FAMILY HISTORY     He indicated that his mother is . He reported the following about his father: unknown. He indicated that the status of his maternal grandmother is unknown. He indicated that the status of his maternal grandfather is unknown.   family history includes Arthritis in his maternal grandfather; Depression in his mother; Diabetes in his mother; Early Death in his mother; Heart Disease in his maternal grandfather; High Blood Pressure in his mother; High Cholesterol in his mother; Other in his mother; Vision Loss in his maternal grandmother. SOCIAL HISTORY      reports that he quit smoking about 35 years ago. His smoking use included cigars. He has a 65.00 pack-year smoking history. He has never used smokeless tobacco. He reports previous alcohol use. He reports that he does not use drugs. PHYSICAL EXAM     INITIAL VITALS:  height is 5' 6\" (1.676 m) and weight is 228 lb (103.4 kg). His oral temperature is 97.3 °F (36.3 °C). His blood pressure is 131/81 and his pulse is 93. His respiration is 16 and oxygen saturation is 97%. Physical Exam  Vitals signs and nursing note reviewed. Constitutional:       Appearance: He is well-developed. He is not diaphoretic. HENT:      Head: Normocephalic and atraumatic. Nose: Nose normal.   Eyes:      General: No scleral icterus. Right eye: No discharge. Left eye: No discharge. Conjunctiva/sclera: Conjunctivae normal.      Pupils: Pupils are equal, round, and reactive to light. Neck:      Musculoskeletal: Normal range of motion and neck supple. Vascular: No JVD. Trachea: No tracheal deviation. Cardiovascular:      Rate and Rhythm: Normal rate and regular rhythm. Heart sounds: Normal heart sounds. No murmur.  No friction rub. No gallop. Pulmonary:      Effort: Pulmonary effort is normal. No respiratory distress. Breath sounds: Normal breath sounds. No stridor. No wheezing or rales. Chest:      Chest wall: No tenderness. Abdominal:      General: Bowel sounds are normal. There is no distension. Palpations: Abdomen is soft. There is no mass. Tenderness: There is no abdominal tenderness. There is no guarding or rebound. Hernia: No hernia is present. Musculoskeletal:         General: No tenderness or deformity. Comments: RLE AKA changes. Lymphadenopathy:      Cervical: No cervical adenopathy. Skin:     General: Skin is warm and dry. Capillary Refill: Capillary refill takes less than 2 seconds. Coloration: Skin is not pale. Findings: No erythema or rash. Neurological:      Mental Status: He is alert and oriented to person, place, and time. Cranial Nerves: No cranial nerve deficit. Sensory: No sensory deficit. Motor: No abnormal muscle tone. Coordination: Coordination normal.      Deep Tendon Reflexes: Reflexes normal.   Psychiatric:         Behavior: Behavior normal.         Thought Content:  Thought content normal.         Judgment: Judgment normal.           DIFFERENTIAL DIAGNOSIS:   GI bleeding, peptic ulcer, chronic abdominal pain    DIAGNOSTIC RESULTS     EKG: All EKG's are interpreted by the Emergency Department Physician who either signs or Co-signsthis chart in the absence of a cardiologist.  None    RADIOLOGY: non-plain film images(s) such as CT, Ultrasound and MRI are read by the radiologist.    No orders to display       []Visualized and interpreted by me   [] Radiologist's Wet Read Report Reviewed   [] Discussed with Radiologist.    Marshall Emmanuel:   Results for orders placed or performed during the hospital encounter of 03/11/20   CBC Auto Differential   Result Value Ref Range    WBC 11.7 (H) 4.8 - 10.8 thou/mm3    RBC 5.06 4.70 - 6.10 mill/mm3 Hemoglobin 15.3 14.0 - 18.0 gm/dl    Hematocrit 42.1 42.0 - 52.0 %    MCV 83.2 80.0 - 94.0 fL    MCH 30.2 26.0 - 33.0 pg    MCHC 36.3 (H) 32.2 - 35.5 gm/dl    RDW-CV 13.8 11.5 - 14.5 %    RDW-SD 40.6 35.0 - 45.0 fL    Platelets 729 378 - 026 thou/mm3    MPV 9.6 9.4 - 12.4 fL    Seg Neutrophils 62.5 %    Lymphocytes 23.5 %    Monocytes 7.4 %    Eosinophils 5.1 %    Basophils 0.7 %    Immature Granulocytes 0.8 %    Segs Absolute 7.3 1.8 - 7.7 thou/mm3    Lymphocytes Absolute 2.7 1.0 - 4.8 thou/mm3    Monocytes Absolute 0.9 0.4 - 1.3 thou/mm3    Eosinophils Absolute 0.6 (H) 0.0 - 0.4 thou/mm3    Basophils Absolute 0.1 0.0 - 0.1 thou/mm3    Immature Grans (Abs) 0.09 (H) 0.00 - 0.07 thou/mm3    nRBC 0 /100 wbc   Comprehensive Metabolic Panel   Result Value Ref Range    Glucose 285 (H) 70 - 108 mg/dL    CREATININE 0.8 0.4 - 1.2 mg/dL    BUN 14 7 - 22 mg/dL    Sodium 133 (L) 135 - 145 meq/L    Potassium 4.1 3.5 - 5.2 meq/L    Chloride 96 (L) 98 - 111 meq/L    CO2 23 23 - 33 meq/L    Calcium 9.5 8.5 - 10.5 mg/dL    AST 23 5 - 40 U/L    Alkaline Phosphatase 72 38 - 126 U/L    Total Protein 7.8 6.1 - 8.0 g/dL    Alb 4.2 3.5 - 5.1 g/dL    Total Bilirubin 0.3 0.3 - 1.2 mg/dL    ALT 22 11 - 66 U/L   Lipase   Result Value Ref Range    Lipase 145.4 (H) 5.6 - 51.3 U/L   Troponin   Result Value Ref Range    Troponin T < 0.010 ng/ml   Blood occult stool screen #1   Result Value Ref Range    OCCULT BLOOD FECAL Negative    Anion Gap   Result Value Ref Range    Anion Gap 14.0 8.0 - 16.0 meq/L   Glomerular Filtration Rate, Estimated   Result Value Ref Range    Est, Glom Filt Rate >90 ml/min/1.73m2   Osmolality   Result Value Ref Range    Osmolality Calc 277.2 275.0 - 300 mOsmol/kg       EMERGENCY DEPARTMENT COURSE:   Vitals:    Vitals:    03/11/20 1622 03/11/20 1710 03/11/20 1808 03/11/20 1846   BP: (!) 151/86 (!) 155/77 124/77 131/81   Pulse: 96 93 88 93   Resp: 14 16 16 16   Temp: 97.3 °F (36.3 °C)      TempSrc: Oral      SpO2: 98% 96% 98% 97%   Weight: 228 lb (103.4 kg)      Height: 5' 6\" (1.676 m)          17: 10    Patient is seen and evaluated in a timely fashion. Action:     Large-bore IV, labs, no need to repeat CT abdomen pelvis as abdominal exam is benign, medications include Carafate and Bentyl    MedicalDecision Making    Reassessment:     Patient feels better with following ED medications. Medications   dicyclomine (BENTYL) capsule 10 mg (has no administration in time range)   sucralfate (CARAFATE) 1 GM/10ML suspension 1 g (has no administration in time range)       Labs are reassuring at his baselines. Stool occult blood negative. Patient has chronic abdomnal pain, I suggest close GI follow-up is the best for him. No suspicion patient's abdominal pain is caused by SBO, ruptured AAA, volvulus, intussusception, appendicitis, pancreatitis, or other catastrophic intra-abdominal event. Discharged with GI follow-up as soon as possible. He should call to reschedule appointment. CRITICAL CARE:   None    CONSULTS:  None    PROCEDURES:  None    FINAL IMPRESSION      1.  Chronic abdominal pain          DISPOSITION/PLAN   Home    PATIENT REFERRED TO:  Alejandra Solis MD  [address] [id number]  [phone]    In 3 days  call tomorrow to re-schedule an appointment      DISCHARGE MEDICATIONS:  Discharge Medication List as of 3/11/2020  7:37 PM          (Please note that portions of this note were completed with a voice recognition program.  Efforts were made to edit the dictations but occasionally words aremis-transcribed.)    MD Tiffanie Carreon MD  03/12/20 8260

## 2020-03-11 NOTE — ED PROVIDER NOTES
Ed for evaluation. Patient expresses frustration with medical staff over stating, \"I am in pain and on one cares. \" Patient states that he wants admitted and wants the colonoscopy done today. Triage notes and Nursing notes were reviewed by myself. Any discrepancies are addressed above. PAST MEDICAL HISTORY     Past Medical History:   Diagnosis Date    Bipolar 2 disorder [facility])     previously followed with Dr. Christina Bolanos and Flori Pruett in [facility] Diabetes mellitus type 2, uncontrolled ([address]     HgbA1c on 4/2/2019 was 9.1.     Diabetic polyneuropathy ([address]     Diabetic ulcer of right foot associated with type 2 diabetes mellitus ([address] 12/10/2015    Essential hypertension     \"never been on b/p medication that I know of\"    GERD (gastroesophageal reflux disease)     Hammer toe of left foot     Heart murmur     denies any chest pain or palpitations    History of tobacco abuse     Hx of AKA (above knee amputation), right ([address] 04/18/2019    Dr. Joey German edema, diabetic, bilateral ([address] 05/04/2018    Dr. 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 Right 07/20/2016    LEG AMPUTATION BELOW KNEE Right 4/4/2019    I&D AND REVISION OF AMPUTATION RIGHT LEG performed by Sharon Greenfield MD at [address] [facility] Right SERTRALINE (ZOLOFT) 50 MG TABLET    Take 1 tablet by mouth daily    SUCRALFATE (CARAFATE) 1 GM TABLET    Take 1 tablet by mouth 4 times daily (before meals and nightly)    TRAMADOL (ULTRAM) 50 MG TABLET    Take 1 tablet by mouth every 8 hours as needed for Pain for up to 2 days. Intended supply: 3 days.  Take lowest dose possible to manage pain       ALLERGIES     Pcn [penicillins] and Clindamycin/lincomycin    FAMILY HISTORY       Family History   Problem Relation Age of Onset    Diabetes Mother     Other Mother         pneumonia, H1N1    Depression Mother     Early Death Mother     High Blood Pressure Mother     High Cholesterol Mother     Vision Loss Maternal Grandmother     Arthritis Maternal Grandfather     Heart Disease Maternal Grandfather         SOCIAL HISTORY       Social History     Socioeconomic History    Marital status:      Spouse name: None    Number of children: 2    Years of education: 15    Highest education level: None   Occupational History    None   Social Needs    Financial resource strain: None    Food insecurity     Worry: None     Inability: None    Transportation needs     Medical: None     Non-medical: None   Tobacco Use    Smoking status: Former Smoker     Packs/day: 5.00     Years: 13.00     Pack years: 65.00     Types: Cigars     Last attempt to quit:      Years since quittin.2    Smokeless tobacco: Never Used   Substance and Sexual Activity    Alcohol use: Not Currently     Alcohol/week: 0.0 standard drinks    Drug use: No    Sexual activity: Yes     Partners: Female   Lifestyle    Physical activity     Days per week: None     Minutes per session: None    Stress: None   Relationships    Social connections     Talks on phone: None     Gets together: None     Attends [employer] service: None     Active member of club or organization: None     Attends meetings of clubs or organizations: None     Relationship status: None    Intimate partner Effort: Pulmonary effort is normal. No respiratory distress. Breath sounds: No stridor. Comments: Clear to auscultation  Abdominal:      General: There is no distension. Palpations: Abdomen is soft. Tenderness: There is no abdominal tenderness. There is no guarding or rebound. Musculoskeletal: Normal range of motion. Left lower leg: No edema. Feet:      Right foot:      Amputation: Right leg is amputated above knee. Skin:     General: Skin is warm and dry. Findings: No erythema. Neurological:      Mental Status: He is alert and oriented to person, place, and time. Motor: No abnormal muscle tone. Psychiatric:         Behavior: Behavior normal.         DIAGNOSTIC RESULTS     EKG:(none if blank)  All EKG's are interpreted by [facility] Physician who either signs or Co-signs this chart in the absence of a cardiologist.        RADIOLOGY: (none if blank)   Interpretation per the Radiologistbelow, if available at the time of this note:    No orders to display       LABS:  Labs Reviewed - No data to display    All other labs were within normal range or not returned as of this dictation. Please note, any cultures that may have been sent were not resulted at the time of this patient visit. EMERGENCY DEPARTMENT COURSE andMedical Decision Making:     MDM/   This is a 58-year-old male who presented to the ED today with a chief complaint of nausea and vomiting and hematemesis. Patient was recently in the ED in the past 48 hours and received a full work-up related to the same complaint and it was determined that he would require a colonoscopy to further evaluate a possible source of his reported GI bleed.   I suspect based on the patient's history and reported symptoms that he may be suffering from gastroparesis, however during my initial evaluation of the patient he grew upset that nobody was performing a colonoscopy on him in the ED and I formed him that this is something that I would be willing to help set up for him through an outpatient GI referral however he refused this. The patient was offered treatment for his nausea vomiting and pain however he declined to receive any medication and wished to leave the ED. I commended that the patient remain for further testing and treatment however the patient eloped at that time. ED Medications administered this visit:  Medications - No data to display      Procedures: (None if blank)       CLINICAL       1. Nausea and vomiting, intractability of vomiting not specified, unspecified vomiting type          DISPOSITION/PLAN   DISPOSITION Eloped - Left Before Treatment Complete 03/10/2020 09:15:33 PM      PATIENT REFERRED TO:  No follow-up provider specified. DISCHARGE MEDICATIONS:  New Prescriptions    No medications on file              (Please note that portions of this note were completed with a voice recognition program.  Efforts were made to edit the dictations but occasionallywords are mis-transcribed.)    Jermaine Mccoy PA-C   (electronically signed)    Scribe:  Henrietta Hensley 3/10/20 8:53 PM EDT Scribing for and in the presence of Jermaine Mccoy PA-C. Signed by: Denia Kearney, 03/10/20 9:46 PM    Provider:  I personally performed the services described in the documentation, reviewed and edited the documentation which was dictated to the scribe in my presence, and it accurately records my words and actions.     Jermaine Mccoy PA-C 03/10/20 9:46 PM        Jens Jimenez  03/11/20 1208

## 2020-03-11 NOTE — ED NOTES
Pt medicated per orders. Appears in no acute distress at this time. VSS- playing on cell phone w/ friend at bedside.       Angy Ladd RN  03/11/20 0584

## 2020-03-12 ENCOUNTER — APPOINTMENT (OUTPATIENT)
Dept: ULTRASOUND IMAGING | Age: 50
DRG: 445 | End: 2020-03-12
Attending: INTERNAL MEDICINE
Payer: MEDICARE

## 2020-03-12 ENCOUNTER — OFFICE VISIT (OUTPATIENT)
Dept: INTERNAL MEDICINE CLINIC | Age: 50
End: 2020-03-12
Payer: MEDICARE

## 2020-03-12 ENCOUNTER — NURSE ONLY (OUTPATIENT)
Dept: LAB | Age: 50
End: 2020-03-12

## 2020-03-12 ENCOUNTER — APPOINTMENT (OUTPATIENT)
Dept: PHYSICAL THERAPY | Age: 50
End: 2020-03-12
Payer: MEDICARE

## 2020-03-12 ENCOUNTER — CARE COORDINATION (OUTPATIENT)
Dept: CARE COORDINATION | Age: 50
End: 2020-03-12

## 2020-03-12 ENCOUNTER — HOSPITAL ENCOUNTER (INPATIENT)
Age: 50
LOS: 1 days | Discharge: NURSING, REHAB OR LONG-TERM CARE | DRG: 445 | End: 2020-03-13
Attending: INTERNAL MEDICINE | Admitting: INTERNAL MEDICINE
Payer: MEDICARE

## 2020-03-12 VITALS — HEART RATE: 95 BPM | DIASTOLIC BLOOD PRESSURE: 63 MMHG | SYSTOLIC BLOOD PRESSURE: 133 MMHG

## 2020-03-12 PROBLEM — R74.8 ELEVATED LIPASE: Status: ACTIVE | Noted: 2020-03-12

## 2020-03-12 PROBLEM — F19.10 OTHER PSYCHOACTIVE SUBSTANCE ABUSE, UNCOMPLICATED (HCC): Status: ACTIVE | Noted: 2020-03-12

## 2020-03-12 PROBLEM — L89.890: Status: ACTIVE | Noted: 2020-03-12

## 2020-03-12 LAB
ALBUMIN SERPL-MCNC: 4 G/DL (ref 3.5–5.1)
ALP BLD-CCNC: 74 U/L (ref 38–126)
ALT SERPL-CCNC: 28 U/L (ref 11–66)
AMYLASE: 38 U/L (ref 20–104)
ANION GAP SERPL CALCULATED.3IONS-SCNC: 18 MEQ/L (ref 8–16)
AST SERPL-CCNC: 29 U/L (ref 5–40)
BILIRUB SERPL-MCNC: 0.4 MG/DL (ref 0.3–1.2)
BILIRUBIN DIRECT: < 0.2 MG/DL (ref 0–0.3)
BUN BLDV-MCNC: 16 MG/DL (ref 7–22)
CALCIUM SERPL-MCNC: 9.5 MG/DL (ref 8.5–10.5)
CHLORIDE BLD-SCNC: 95 MEQ/L (ref 98–111)
CO2: 19 MEQ/L (ref 23–33)
CREAT SERPL-MCNC: 0.8 MG/DL (ref 0.4–1.2)
ERYTHROCYTE [DISTWIDTH] IN BLOOD BY AUTOMATED COUNT: 14.1 % (ref 11.5–14.5)
ERYTHROCYTE [DISTWIDTH] IN BLOOD BY AUTOMATED COUNT: 45 FL (ref 35–45)
GFR SERPL CREATININE-BSD FRML MDRD: > 90 ML/MIN/1.73M2
GLUCOSE BLD-MCNC: 212 MG/DL (ref 70–108)
GLUCOSE BLD-MCNC: 261 MG/DL (ref 70–108)
GLUCOSE BLD-MCNC: 324 MG/DL (ref 70–108)
HCT VFR BLD CALC: 44.9 % (ref 42–52)
HEMOGLOBIN: 15.1 GM/DL (ref 14–18)
LIPASE: 27.5 U/L (ref 5.6–51.3)
MCH RBC QN AUTO: 29.5 PG (ref 26–33)
MCHC RBC AUTO-ENTMCNC: 33.6 GM/DL (ref 32.2–35.5)
MCV RBC AUTO: 87.7 FL (ref 80–94)
PLATELET # BLD: 293 THOU/MM3 (ref 130–400)
PMV BLD AUTO: 10.1 FL (ref 9.4–12.4)
POTASSIUM SERPL-SCNC: 4.4 MEQ/L (ref 3.5–5.2)
RBC # BLD: 5.12 MILL/MM3 (ref 4.7–6.1)
SODIUM BLD-SCNC: 132 MEQ/L (ref 135–145)
TOTAL PROTEIN: 8.2 G/DL (ref 6.1–8)
WBC # BLD: 12.1 THOU/MM3 (ref 4.8–10.8)

## 2020-03-12 PROCEDURE — G8926 SPIRO NO PERF OR DOC: HCPCS | Performed by: NURSE PRACTITIONER

## 2020-03-12 PROCEDURE — 1036F TOBACCO NON-USER: CPT | Performed by: NURSE PRACTITIONER

## 2020-03-12 PROCEDURE — 99214 OFFICE O/P EST MOD 30 MIN: CPT | Performed by: NURSE PRACTITIONER

## 2020-03-12 PROCEDURE — 3017F COLORECTAL CA SCREEN DOC REV: CPT | Performed by: NURSE PRACTITIONER

## 2020-03-12 PROCEDURE — 6360000002 HC RX W HCPCS: Performed by: NURSE PRACTITIONER

## 2020-03-12 PROCEDURE — 2580000003 HC RX 258: Performed by: NURSE PRACTITIONER

## 2020-03-12 PROCEDURE — 6370000000 HC RX 637 (ALT 250 FOR IP): Performed by: NURSE PRACTITIONER

## 2020-03-12 PROCEDURE — 99223 1ST HOSP IP/OBS HIGH 75: CPT | Performed by: NURSE PRACTITIONER

## 2020-03-12 PROCEDURE — G8417 CALC BMI ABV UP PARAM F/U: HCPCS | Performed by: NURSE PRACTITIONER

## 2020-03-12 PROCEDURE — 1200000000 HC SEMI PRIVATE

## 2020-03-12 PROCEDURE — G8484 FLU IMMUNIZE NO ADMIN: HCPCS | Performed by: NURSE PRACTITIONER

## 2020-03-12 PROCEDURE — 36415 COLL VENOUS BLD VENIPUNCTURE: CPT

## 2020-03-12 PROCEDURE — 3052F HG A1C>EQUAL 8.0%<EQUAL 9.0%: CPT | Performed by: NURSE PRACTITIONER

## 2020-03-12 PROCEDURE — 94760 N-INVAS EAR/PLS OXIMETRY 1: CPT

## 2020-03-12 PROCEDURE — 3023F SPIROM DOC REV: CPT | Performed by: NURSE PRACTITIONER

## 2020-03-12 PROCEDURE — 85027 COMPLETE CBC AUTOMATED: CPT

## 2020-03-12 PROCEDURE — 82948 REAGENT STRIP/BLOOD GLUCOSE: CPT

## 2020-03-12 PROCEDURE — 2022F DILAT RTA XM EVC RTNOPTHY: CPT | Performed by: NURSE PRACTITIONER

## 2020-03-12 PROCEDURE — 80048 BASIC METABOLIC PNL TOTAL CA: CPT

## 2020-03-12 PROCEDURE — 76705 ECHO EXAM OF ABDOMEN: CPT

## 2020-03-12 PROCEDURE — 1111F DSCHRG MED/CURRENT MED MERGE: CPT | Performed by: NURSE PRACTITIONER

## 2020-03-12 PROCEDURE — G8427 DOCREV CUR MEDS BY ELIG CLIN: HCPCS | Performed by: NURSE PRACTITIONER

## 2020-03-12 RX ORDER — METOCLOPRAMIDE 10 MG/1
10 TABLET ORAL 4 TIMES DAILY
Status: DISCONTINUED | OUTPATIENT
Start: 2020-03-12 | End: 2020-03-13 | Stop reason: HOSPADM

## 2020-03-12 RX ORDER — SODIUM CHLORIDE 9 MG/ML
INJECTION, SOLUTION INTRAVENOUS CONTINUOUS
Status: DISCONTINUED | OUTPATIENT
Start: 2020-03-12 | End: 2020-03-13 | Stop reason: HOSPADM

## 2020-03-12 RX ORDER — DEXTROSE MONOHYDRATE 25 G/50ML
12.5 INJECTION, SOLUTION INTRAVENOUS PRN
Status: DISCONTINUED | OUTPATIENT
Start: 2020-03-12 | End: 2020-03-13 | Stop reason: HOSPADM

## 2020-03-12 RX ORDER — POTASSIUM CHLORIDE 7.45 MG/ML
10 INJECTION INTRAVENOUS PRN
Status: DISCONTINUED | OUTPATIENT
Start: 2020-03-12 | End: 2020-03-13 | Stop reason: HOSPADM

## 2020-03-12 RX ORDER — ACETAMINOPHEN 325 MG/1
650 TABLET ORAL EVERY 4 HOURS PRN
Status: DISCONTINUED | OUTPATIENT
Start: 2020-03-12 | End: 2020-03-13 | Stop reason: HOSPADM

## 2020-03-12 RX ORDER — NICOTINE POLACRILEX 4 MG
15 LOZENGE BUCCAL PRN
Status: DISCONTINUED | OUTPATIENT
Start: 2020-03-12 | End: 2020-03-13 | Stop reason: HOSPADM

## 2020-03-12 RX ORDER — PANTOPRAZOLE SODIUM 40 MG/1
40 TABLET, DELAYED RELEASE ORAL
Status: DISCONTINUED | OUTPATIENT
Start: 2020-03-12 | End: 2020-03-13 | Stop reason: HOSPADM

## 2020-03-12 RX ORDER — SUCRALFATE 1 G/1
1 TABLET ORAL
Status: DISCONTINUED | OUTPATIENT
Start: 2020-03-12 | End: 2020-03-13 | Stop reason: HOSPADM

## 2020-03-12 RX ORDER — INSULIN GLARGINE 100 [IU]/ML
80 INJECTION, SOLUTION SUBCUTANEOUS 2 TIMES DAILY
Status: DISCONTINUED | OUTPATIENT
Start: 2020-03-12 | End: 2020-03-13 | Stop reason: HOSPADM

## 2020-03-12 RX ORDER — SODIUM CHLORIDE 0.9 % (FLUSH) 0.9 %
10 SYRINGE (ML) INJECTION PRN
Status: DISCONTINUED | OUTPATIENT
Start: 2020-03-12 | End: 2020-03-13 | Stop reason: HOSPADM

## 2020-03-12 RX ORDER — DEXTROSE MONOHYDRATE 50 MG/ML
100 INJECTION, SOLUTION INTRAVENOUS PRN
Status: DISCONTINUED | OUTPATIENT
Start: 2020-03-12 | End: 2020-03-13 | Stop reason: HOSPADM

## 2020-03-12 RX ORDER — AMLODIPINE BESYLATE 2.5 MG/1
2.5 TABLET ORAL DAILY
Status: DISCONTINUED | OUTPATIENT
Start: 2020-03-13 | End: 2020-03-13 | Stop reason: HOSPADM

## 2020-03-12 RX ORDER — ONDANSETRON 2 MG/ML
4 INJECTION INTRAMUSCULAR; INTRAVENOUS EVERY 6 HOURS PRN
Status: DISCONTINUED | OUTPATIENT
Start: 2020-03-12 | End: 2020-03-13 | Stop reason: HOSPADM

## 2020-03-12 RX ORDER — MORPHINE SULFATE 2 MG/ML
2 INJECTION, SOLUTION INTRAMUSCULAR; INTRAVENOUS
Status: DISCONTINUED | OUTPATIENT
Start: 2020-03-12 | End: 2020-03-13 | Stop reason: HOSPADM

## 2020-03-12 RX ORDER — POTASSIUM CHLORIDE 20 MEQ/1
40 TABLET, EXTENDED RELEASE ORAL PRN
Status: DISCONTINUED | OUTPATIENT
Start: 2020-03-12 | End: 2020-03-13 | Stop reason: HOSPADM

## 2020-03-12 RX ORDER — POLYETHYLENE GLYCOL 3350 17 G/17G
17 POWDER, FOR SOLUTION ORAL DAILY PRN
Status: DISCONTINUED | OUTPATIENT
Start: 2020-03-12 | End: 2020-03-13 | Stop reason: HOSPADM

## 2020-03-12 RX ORDER — GABAPENTIN 300 MG/1
300 CAPSULE ORAL 3 TIMES DAILY
Status: DISCONTINUED | OUTPATIENT
Start: 2020-03-12 | End: 2020-03-13 | Stop reason: HOSPADM

## 2020-03-12 RX ORDER — HYDROCODONE BITARTRATE AND ACETAMINOPHEN 5; 325 MG/1; MG/1
1 TABLET ORAL EVERY 4 HOURS PRN
Status: DISCONTINUED | OUTPATIENT
Start: 2020-03-12 | End: 2020-03-13 | Stop reason: HOSPADM

## 2020-03-12 RX ORDER — MAGNESIUM SULFATE IN WATER 40 MG/ML
2 INJECTION, SOLUTION INTRAVENOUS PRN
Status: DISCONTINUED | OUTPATIENT
Start: 2020-03-12 | End: 2020-03-13 | Stop reason: HOSPADM

## 2020-03-12 RX ORDER — SODIUM CHLORIDE 0.9 % (FLUSH) 0.9 %
10 SYRINGE (ML) INJECTION EVERY 12 HOURS SCHEDULED
Status: DISCONTINUED | OUTPATIENT
Start: 2020-03-12 | End: 2020-03-13 | Stop reason: HOSPADM

## 2020-03-12 RX ORDER — DICYCLOMINE HYDROCHLORIDE 10 MG/1
10 CAPSULE ORAL 4 TIMES DAILY
Status: DISCONTINUED | OUTPATIENT
Start: 2020-03-12 | End: 2020-03-13 | Stop reason: HOSPADM

## 2020-03-12 RX ORDER — PROMETHAZINE HYDROCHLORIDE 25 MG/1
12.5 TABLET ORAL EVERY 6 HOURS PRN
Status: DISCONTINUED | OUTPATIENT
Start: 2020-03-12 | End: 2020-03-13 | Stop reason: HOSPADM

## 2020-03-12 RX ORDER — PIOGLITAZONEHYDROCHLORIDE 30 MG/1
30 TABLET ORAL DAILY
Status: DISCONTINUED | OUTPATIENT
Start: 2020-03-13 | End: 2020-03-13 | Stop reason: HOSPADM

## 2020-03-12 RX ADMIN — GABAPENTIN 300 MG: 300 CAPSULE ORAL at 21:32

## 2020-03-12 RX ADMIN — MORPHINE SULFATE 2 MG: 2 INJECTION, SOLUTION INTRAMUSCULAR; INTRAVENOUS at 21:34

## 2020-03-12 RX ADMIN — METOCLOPRAMIDE 10 MG: 10 TABLET ORAL at 23:00

## 2020-03-12 RX ADMIN — PANTOPRAZOLE SODIUM 40 MG: 40 TABLET, DELAYED RELEASE ORAL at 17:09

## 2020-03-12 RX ADMIN — ENOXAPARIN SODIUM 40 MG: 40 INJECTION SUBCUTANEOUS at 21:32

## 2020-03-12 RX ADMIN — DICYCLOMINE HYDROCHLORIDE 10 MG: 10 CAPSULE ORAL at 17:00

## 2020-03-12 RX ADMIN — MORPHINE SULFATE 2 MG: 2 INJECTION, SOLUTION INTRAMUSCULAR; INTRAVENOUS at 18:57

## 2020-03-12 RX ADMIN — DICYCLOMINE HYDROCHLORIDE 10 MG: 10 CAPSULE ORAL at 23:00

## 2020-03-12 RX ADMIN — INSULIN GLARGINE 80 UNITS: 100 INJECTION, SOLUTION SUBCUTANEOUS at 21:30

## 2020-03-12 RX ADMIN — SUCRALFATE 1 G: 1 TABLET ORAL at 17:00

## 2020-03-12 RX ADMIN — METOCLOPRAMIDE 10 MG: 10 TABLET ORAL at 17:00

## 2020-03-12 RX ADMIN — SODIUM CHLORIDE: 9 INJECTION, SOLUTION INTRAVENOUS at 15:11

## 2020-03-12 RX ADMIN — INSULIN LISPRO 8 UNITS: 100 INJECTION, SOLUTION INTRAVENOUS; SUBCUTANEOUS at 17:01

## 2020-03-12 RX ADMIN — SUCRALFATE 1 G: 1 TABLET ORAL at 23:00

## 2020-03-12 RX ADMIN — GABAPENTIN 300 MG: 300 CAPSULE ORAL at 17:00

## 2020-03-12 ASSESSMENT — PAIN DESCRIPTION - ONSET
ONSET: ON-GOING
ONSET: ON-GOING

## 2020-03-12 ASSESSMENT — PAIN DESCRIPTION - DESCRIPTORS
DESCRIPTORS: ACHING
DESCRIPTORS: ACHING;CRAMPING;SHARP;STABBING

## 2020-03-12 ASSESSMENT — ENCOUNTER SYMPTOMS
EYE ITCHING: 0
CHEST TIGHTNESS: 0
PHOTOPHOBIA: 0
BACK PAIN: 0
SORE THROAT: 0
WHEEZING: 0
EYE ITCHING: 0
ABDOMINAL PAIN: 1
COUGH: 0
ABDOMINAL PAIN: 1
NAUSEA: 0
DIARRHEA: 0
SORE THROAT: 0
SHORTNESS OF BREATH: 0
CONSTIPATION: 0
CONSTIPATION: 0
EYE DISCHARGE: 0
BLOOD IN STOOL: 1
COUGH: 0
VOMITING: 1
SHORTNESS OF BREATH: 0
ABDOMINAL DISTENTION: 0
VOMITING: 0
CHOKING: 0
TROUBLE SWALLOWING: 0
EYE REDNESS: 0
EYE PAIN: 0
RHINORRHEA: 0
DIARRHEA: 0
STRIDOR: 0
NAUSEA: 1

## 2020-03-12 ASSESSMENT — PAIN SCALES - GENERAL
PAINLEVEL_OUTOF10: 8

## 2020-03-12 ASSESSMENT — PAIN DESCRIPTION - ORIENTATION
ORIENTATION: INNER
ORIENTATION: INNER

## 2020-03-12 ASSESSMENT — PAIN DESCRIPTION - LOCATION
LOCATION: ABDOMEN
LOCATION: ABDOMEN

## 2020-03-12 ASSESSMENT — PAIN DESCRIPTION - PROGRESSION
CLINICAL_PROGRESSION: NOT CHANGED

## 2020-03-12 ASSESSMENT — PAIN DESCRIPTION - FREQUENCY
FREQUENCY: CONTINUOUS
FREQUENCY: CONTINUOUS

## 2020-03-12 ASSESSMENT — PAIN DESCRIPTION - PAIN TYPE
TYPE: ACUTE PAIN
TYPE: ACUTE PAIN

## 2020-03-12 NOTE — CARE COORDINATION
3/12/20, 3:14 PM EDT    DISCHARGE PLANNING EVALUATION      Order received for transportation concerns. Patient has a care coordination care manager who has been working on this for several weeks. Patient is busy with other staff. Will try to address transportation needs tomorrow.

## 2020-03-12 NOTE — H&P
AND DRAINAGE performed by Theodora Mckeon MD at [facility] OFFICE/OUTPT [address] [facility] Right 9/20/2018    EXCISIONAL DEBRIDEMENT RIGHT BKA STUMP performed by Larisa Almodovar MD at [address] Right 1/16/15    2nd toe with wound vac applied    UPPER GASTROINTESTINAL ENDOSCOPY N/A 3/3/2020    EGD DILATION SAVORY performed by Clint Go MD at [facility]  ? when       Medications Prior to Admission:      Prior to Admission medications    Medication Sig Start Date End Date Taking?  Authorizing Provider   dicyclomine (BENTYL) 10 MG capsule Take 1 capsule by mouth 4 times daily 3/11/20  Yes MARILYN Durán CNP   metoclopramide (REGLAN) 10 MG tablet Take 1 tablet by mouth 4 times daily 3/11/20  Yes MARILYN Durán CNP   polyethylene glycol (GLYCOLAX) packet Take 17 g by mouth daily as needed for Constipation 3/3/20 4/3/20 Yes Scott Metzger MD   pantoprazole (PROTONIX) 40 MG tablet Take 1 tablet by mouth 2 times daily (before meals) 3/3/20  Yes Scott Metzger MD   sucralfate (CARAFATE) 1 GM tablet Take 1 tablet by mouth 4 times daily (before meals and nightly) 3/3/20  Yes Scott Metzger MD   amLODIPine (NORVASC) 2.5 MG tablet Take 1 tablet by mouth daily 2/19/20  Yes MARLIYN Durán CNP   sertraline (ZOLOFT) 50 MG tablet Take 1 tablet by mouth daily 2/19/20  Yes MARILYN Durán CNP   insulin aspart (NOVOLOG FLEXPEN) 100 UNIT/ML injection pen Inject 20 Units into the skin 3 times daily (before meals) 2/19/20  Yes MARILYN Durán CNP   insulin detemir (LEVEMIR FLEXTOUCH) 100 UNIT/ML injection pen Inject 80 Units into the skin 2 times daily 2/19/20  Yes MARILYN Durán CNP   pioglitazone (ACTOS) 30 MG tablet Take 1 tablet by mouth daily 1/22/20  Yes MARILYN Durán CNP   gabapentin (NEURONTIN) 300 MG capsule TAKE 1 CAPSULE BY MOUTH 3 TIMES A DAY 12/30/19 6/27/20 Yes Maura Wolf, MARILYN - CNP 03/12/20  1206   AST 29 23 29   ALT 24 22 28   BILIDIR  --   --  <0.2   BILITOT 0.4 0.3 0.4   ALKPHOS 74 72 74     No results for input(s): INR in the last 72 hours. Recent Labs     03/11/20  1815   TROPONINT < 0.010     Procalcitonin:  No results for input(s): PROCAL in the last 72 hours. Lactic Acid: No results for input(s): LACTA in the last 72 hours. Urinalysis:      Lab Results   Component Value Date    NITRU NEGATIVE 03/08/2020    WBCUA 0-2 03/02/2020    BACTERIA NONE SEEN 03/02/2020    RBCUA 0-2 03/02/2020    BLOODU NEGATIVE 03/08/2020    SPECGRAV >1.030 06/06/2019    GLUCOSEU >= 1000 03/08/2020     Thank you MARILYN Barkley CNP for the opportunity to be involved in this patient's care.     Electronically signed by MARILYN Liz CNP on 3/12/2020 at 2:41 PM

## 2020-03-12 NOTE — CONSULTS
[id number] CONSULT      Patient: Ravinder Acevedo  : 1970  Acct#: [de-identified]  Consult seen for:   Ravinder Acevedo is a 44 y.o. , male with abdominal pain         ASSESSMENT:     1. Abdominal pain; chronic. Generalized   Just ate his entire supper; cheeseburger. No pain right now. No nausea   2. Uncontrolled DM2  3. Hepatic steatosis   4. \"severe\" bipolar per PCP notes  5. Cholelithiasis on CT  6. GERD  7. COPD  8. Hx + Marijuana use per PCP   9. Hx non compliance   10. Hx BKA          PLAN:   1. Negative FOBT  2. Getting US Gb tonight  3. PPI; protonix  4. Carafate   5. Bentyl 4 times a day  6. On Reglan  7. On Lovenox  8. EGD done with DR Camilla Collzao 3-03-20: normal and he had dilation with 54 Kinyarwanda dilator. He was supposed to get OP colonoscopy and then possible SBCE; he had melena at time of EGD. 9. We can get OP colonoscopy scheduled   10. Will follow. However, he could be discharged when attending feels it is ok; he is tolerating regular food. Not ill appearing        Ct A/P  Impression   Fluid in the proximal colon suggestive of a diarrheal illness. Hepatic steatosis. Small gallstones in the gallbladder. No findings to suggest acute cholecystitis or biliary dilatation. Persistent bladder wall thickening, correlate for acute or chronic cystitis. Enlarged prostate, correlate clinically. HISTORY OF PRESENT ILLNESS     patient sitting in chair. He is not ill appearing. He said that his pain is usually generalized. He has some nausea sometimes, but yet just ate his entire supper and had a cheeseburger. He is getting US gallbladder tonight. He is agreeable to getting colonoscopy as OP           PROBLEM LIST    does not have any pertinent problems on file.     PAST MEDICAL HISTORY     has a past medical history of Bipolar 2 disorder ([address] Diabetes mellitus type 2, uncontrolled ([address] Diabetic polyneuropathy ([address] Diabetic ulcer of right foot associated with type 2 [START ON 3/13/2020] pioglitazone  30 mg Oral Daily    [START ON 3/13/2020] sertraline  50 mg Oral Daily    sucralfate  1 g Oral 4x Daily AC & HS    sodium chloride flush  10 mL Intravenous 2 times per day    enoxaparin  40 mg Subcutaneous Q24H    insulin lispro  0-12 Units Subcutaneous TID WC    insulin lispro  0-6 Units Subcutaneous Nightly    insulin glargine  80 Units Subcutaneous BID     Continuous Infusions:   sodium chloride 75 mL/hr at 20 1511    dextrose       PRN Meds:.polyethylene glycol, sodium chloride flush, acetaminophen, promethazine **OR** ondansetron, potassium chloride **OR** potassium alternative oral replacement **OR** potassium chloride, magnesium sulfate, morphine, HYDROcodone 5 mg - acetaminophen, glucose, dextrose, glucagon (rDNA), dextrose    ALLERGIES   is allergic to pcn [penicillins] and clindamycin/lincomycin.     SOCIAL HISTORY    Social History  Social History     Socioeconomic History    Marital status:      Spouse name: Not on file    Number of children: 2    Years of education: 15    Highest education level: Not on file   Occupational History    Not on file   Social Needs    Financial resource strain: Not on file    Food insecurity     Worry: Not on file     Inability: Not on file   [employer] needs     Medical: Not on file     Non-medical: Not on file   Tobacco Use    Smoking status: Former Smoker     Packs/day: 5.00     Years: 13.00     Pack years: 65.00     Types: Cigars     Last attempt to quit:      Years since quittin.2    Smokeless tobacco: Never Used   Substance and Sexual Activity    Alcohol use: Not Currently     Alcohol/week: 0.0 standard drinks    Drug use: No    Sexual activity: Yes     Partners: Female   Lifestyle    Physical activity     Days per week: Not on file     Minutes per session: Not on file    Stress: Not on file   Relationships    Social connections     Talks on phone: Not on file     Gets together: Not on file     Attends Church service: Not on file     Active member of club or organization: Not on file     Attends meetings of clubs or organizations: Not on file     Relationship status: Not on file    Intimate partner violence     Fear of current or ex partner: Not on file     Emotionally abused: Not on file     Physically abused: Not on file     Forced sexual activity: Not on file   Other Topics Concern    Not on file   Social History Narrative    Not on file       FAMILY HISTORY    family history includes Arthritis in his maternal grandfather; Depression in his mother; Diabetes in his mother; Early Death in his mother; Heart Disease in his maternal grandfather; High Blood Pressure in his mother; High Cholesterol in his mother; Other in his mother; Vision Loss in his maternal grandmother. REVIEW OF DIAGNOSTIC TESTING AND LABS:        Hospitalist/Attending provider notes, consulting physician notes, laboratory results and procedure notes reviewed prior to seeing the patient. Note done in collaboration with DR Irasema Watson.    Electronically signed by MARILYN Pendleton CNP on 3/12/20 at 4:17 PM EDT

## 2020-03-12 NOTE — PROGRESS NOTES
[facility]  INTERNAL MEDICINE  [address] [facility] [address]  Dept: [phone]  Dept Fax: [phone] : [phone]     Visit Date:  3/12/2020    Patient:  Paula Hagen  YOB: 1970    HPI:     Chief Complaint   Patient presents with    Follow-Up from Hospital       Abdominal pain - Went back to ED last night. Had one dose of Bentyl, states it made his stomach hurt worse. He picked up Carafate and Protonix. CT abdomen from 3/2 and 3/8 shows small non obstructive gall stones, bladder wall thickening, and enlarged prostate. Fecal occult negative. Having abdominal pain, generalized. States when he eats then lies down, having right sided abdominal pain and tightness, and if he lies on his right side, his left side is painful. He has been having nausea/vomiting, some diarrhea at times. He has reported that his emesis was bloody but his CBC has been stable. During his hospitalization from     Ride issues to see Dr. Marion Berry, would prefer GI closer due to transportation issues. Glucose this am 155. Actos 30 mg, Levemir 80 units, Novolog 20 with meals. Stopped Tradjenta and Metformin to see if that helped with GI issues. Taking Zoloft, Gabapentin, Protonix, Carafate, Actos. Medications  No current outpatient medications on file. The patient is allergic to pcn [penicillins] and clindamycin/lincomycin.     Past Medical History  Jase Tom  has a past medical history of Bipolar 2 disorder ([facility] [address] Diabetes mellitus type 2, uncontrolled ([facility] [address] Diabetic polyneuropathy ([facility] [address] Diabetic ulcer of right foot associated with type 2 diabetes mellitus ([facility] [address] Essential hypertension, GERD (gastroesophageal reflux disease), Hammer toe of left foot, Heart murmur, History of tobacco abuse, Hx of AKA (above knee amputation), right ([address] Macular edema, diabetic, bilateral ([facility] [address] Marijuana abuse in remission, Onychomycosis, Other disorders Pneumococcal 0-64 years Vaccine  Completed    HIV screen  Completed    Hepatitis A vaccine  Aged Out    Hib vaccine  Aged Out    Meningococcal (ACWY) vaccine  Aged Out       Subjective:      Review of Systems   Constitutional: Negative for chills, fatigue and fever. HENT: Negative for sore throat and trouble swallowing. Eyes: Negative for itching and visual disturbance. Respiratory: Negative for cough, choking and shortness of breath. Cardiovascular: Negative for chest pain and leg swelling. Gastrointestinal: Positive for abdominal pain. Negative for constipation, diarrhea, nausea and vomiting. Endocrine: Negative for cold intolerance and heat intolerance. Genitourinary: Positive for difficulty urinating (Intermittent). Negative for dysuria. Musculoskeletal: Negative for arthralgias and myalgias. Skin: Negative for rash and wound. Neurological: Negative for dizziness, tremors, weakness, light-headedness, numbness and headaches. Psychiatric/Behavioral: Negative for agitation, dysphoric mood, sleep disturbance and suicidal ideas. The patient is not nervous/anxious. Objective:     /63 (Site: Right Upper Arm, Position: Sitting, Cuff Size: Large Adult)   Pulse 95     Physical Exam  Vitals signs reviewed. Constitutional:       General: He is not in acute distress. Appearance: He is well-developed. He is obese. He is not diaphoretic. HENT:      Head: Normocephalic and atraumatic. Mouth/Throat:      Pharynx: No oropharyngeal exudate. Eyes:      General: No scleral icterus. Right eye: No discharge. Left eye: No discharge. Pupils: Pupils are equal, round, and reactive to light. Neck:      Musculoskeletal: Normal range of motion and neck supple. Thyroid: No thyromegaly. Cardiovascular:      Rate and Rhythm: Normal rate and regular rhythm. Heart sounds: Normal heart sounds. No murmur. No friction rub. No gallop.     Pulmonary:      Effort: Pulmonary effort is normal. No respiratory distress. Breath sounds: Normal breath sounds. No wheezing or rales. Abdominal:      General: There is distension. Palpations: Abdomen is soft. Tenderness: There is abdominal tenderness (Diffuse, but greater RUQ). Musculoskeletal: Normal range of motion. General: No tenderness or deformity. Comments: Right AKA, stump clean, dry, intact   Lymphadenopathy:      Cervical: No cervical adenopathy. Skin:     General: Skin is warm and dry. Coloration: Skin is not pale. Findings: No erythema or rash. Neurological:      Mental Status: He is alert and oriented to person, place, and time. Cranial Nerves: No cranial nerve deficit. Labs Reviewed 3/12/2020:    Lab Results   Component Value Date    WBC 11.7 (H) 03/11/2020    HGB 15.3 03/11/2020    HCT 42.1 03/11/2020     03/11/2020    CHOL 161 10/10/2018    TRIG 328 (H) 10/10/2018    HDL 30 10/10/2018    ALT 22 03/11/2020    AST 23 03/11/2020     (L) 03/11/2020    K 4.1 03/11/2020    CL 96 (L) 03/11/2020    CREATININE 0.8 03/11/2020    BUN 14 03/11/2020    CO2 23 03/11/2020    TSH 2.700 10/15/2019    INR 0.97 11/06/2019    GLUF 179 (H) 08/24/2016    LABA1C 8.3 (H) 03/03/2020    LABMICR 1.21 10/10/2018     Results for Leroy PENAs (MRN [id number]) as of 3/12/2020 10:35   Ref.  Range 3/8/2020 21:24   Color, UA Latest Ref Range: STRAW-YELL  YELLOW   Glucose, UA Latest Ref Range: NEGATIVE mg/dl >= 1000 (A)   Bilirubin, Urine Latest Ref Range: NEGATIVE  NEGATIVE   Ketones, Urine Latest Ref Range: NEGATIVE  NEGATIVE   Blood, Urine Latest Ref Range: NEGATIVE  NEGATIVE   pH, UA Latest Ref Range: 5.0 - 9.0  5.0   Protein, UA Latest Ref Range: NEGATIVE  NEGATIVE   Urobilinogen, Urine Latest Ref Range: 0.0 - 1.0 eu/dl 0.2   Nitrite, Urine Latest Ref Range: NEGATIVE  NEGATIVE   Leukocyte Esterase, Urine Latest Ref Range: NEGATIVE  NEGATIVE   Character, Urine Latest Ref Range: CLEAR-SL C  CLEAR   URINE RT REFLEX TO CULTURE Unknown Rpt (A)   Specific Gravity, Urine Latest Ref Range: 1.002 - 1.03  > 1.030 (A)     3/2/2020 CT abdomen  Narrative   PROCEDURE: CT ABDOMEN PELVIS W IV CONTRAST       CLINICAL INFORMATION: diffuse abdominal pain, bloating, dark tarry stools .       COMPARISON: February 16, 2019       TECHNIQUE: 5 mm axial CT images were obtained through the abdomen and pelvis after the administration of intravenous and oral contrast. Coronal and sagittal reconstructions were obtained.       All CT scans at this facility use dose modulation, iterative reconstruction, and/or weight-based dosing when appropriate to reduce radiation dose to as low as reasonably achievable.       FINDINGS:        The lung bases are clear. Mild sludge or poorly calcified gallstones in the gallbladder. Mild fatty infiltration of the liver. Kidneys are symmetric without hydronephrosis. Pancreas, adrenal glands and spleen are unremarkable. Normal caliber abdominal aorta. Moderate scattered stool in the colon. No bowel wall thickening or obstruction. Partially decompressed bladder. Wall thickening which may be artifactual. No significant perivesicular infiltration. Correlate with urinalysis is advised. No acute osseous findings.               Impression   Partially decompressed bladder. Wall thickening which may be artifactual. No significant perivesicular infiltration. Correlate with urinalysis is advised. Moderate scattered stool throughout the colon. Correlate for constipation. No bowel wall thickening or obstruction.      3/8/2020 CT abd/pelv  Narrative   PROCEDURE: CT ABDOMEN PELVIS W IV CONTRAST       CLINICAL INFORMATION: Nausea vomiting hematemesis diffuse abdominal pain .       COMPARISON: Abdomen pelvis CT with contrast dated 3/2/2020       TECHNIQUE: 5 mm axial CT images were obtained through the abdomen and pelvis after the administration of intravenous contrast. Coronal and sagittal

## 2020-03-12 NOTE — ED NOTES
Pt left without paperwork.  Pt states he does not need his meds and he will just go to [facility] because, \"you guys are not and have not done anything for me\"     Elvie Burns RN  03/11/20 2004

## 2020-03-13 ENCOUNTER — TELEPHONE (OUTPATIENT)
Dept: INTERNAL MEDICINE CLINIC | Age: 50
End: 2020-03-13

## 2020-03-13 VITALS
DIASTOLIC BLOOD PRESSURE: 80 MMHG | HEART RATE: 76 BPM | SYSTOLIC BLOOD PRESSURE: 135 MMHG | RESPIRATION RATE: 16 BRPM | HEIGHT: 66 IN | BODY MASS INDEX: 36.64 KG/M2 | OXYGEN SATURATION: 96 % | WEIGHT: 228 LBS | TEMPERATURE: 97.4 F

## 2020-03-13 LAB
ALBUMIN SERPL-MCNC: 3.7 G/DL (ref 3.5–5.1)
ALP BLD-CCNC: 62 U/L (ref 38–126)
ALT SERPL-CCNC: 22 U/L (ref 11–66)
ANION GAP SERPL CALCULATED.3IONS-SCNC: 14 MEQ/L (ref 8–16)
AST SERPL-CCNC: 23 U/L (ref 5–40)
BILIRUB SERPL-MCNC: 0.3 MG/DL (ref 0.3–1.2)
BILIRUBIN DIRECT: < 0.2 MG/DL (ref 0–0.3)
BUN BLDV-MCNC: 19 MG/DL (ref 7–22)
CALCIUM SERPL-MCNC: 8.8 MG/DL (ref 8.5–10.5)
CHLORIDE BLD-SCNC: 101 MEQ/L (ref 98–111)
CO2: 20 MEQ/L (ref 23–33)
CREAT SERPL-MCNC: 0.9 MG/DL (ref 0.4–1.2)
GFR SERPL CREATININE-BSD FRML MDRD: 89 ML/MIN/1.73M2
GLUCOSE BLD-MCNC: 256 MG/DL (ref 70–108)
GLUCOSE BLD-MCNC: 273 MG/DL (ref 70–108)
GLUCOSE BLD-MCNC: 287 MG/DL (ref 70–108)
LIPASE: 16.9 U/L (ref 5.6–51.3)
POTASSIUM REFLEX MAGNESIUM: 3.6 MEQ/L (ref 3.5–5.2)
SODIUM BLD-SCNC: 135 MEQ/L (ref 135–145)
TOTAL PROTEIN: 7 G/DL (ref 6.1–8)

## 2020-03-13 PROCEDURE — 80076 HEPATIC FUNCTION PANEL: CPT

## 2020-03-13 PROCEDURE — 6360000002 HC RX W HCPCS: Performed by: NURSE PRACTITIONER

## 2020-03-13 PROCEDURE — 83690 ASSAY OF LIPASE: CPT

## 2020-03-13 PROCEDURE — 82948 REAGENT STRIP/BLOOD GLUCOSE: CPT

## 2020-03-13 PROCEDURE — 2580000003 HC RX 258: Performed by: NURSE PRACTITIONER

## 2020-03-13 PROCEDURE — 6370000000 HC RX 637 (ALT 250 FOR IP): Performed by: NURSE PRACTITIONER

## 2020-03-13 PROCEDURE — 80048 BASIC METABOLIC PNL TOTAL CA: CPT

## 2020-03-13 PROCEDURE — 36415 COLL VENOUS BLD VENIPUNCTURE: CPT

## 2020-03-13 PROCEDURE — 99239 HOSP IP/OBS DSCHRG MGMT >30: CPT | Performed by: FAMILY MEDICINE

## 2020-03-13 RX ADMIN — METOCLOPRAMIDE 10 MG: 10 TABLET ORAL at 12:45

## 2020-03-13 RX ADMIN — INSULIN LISPRO 6 UNITS: 100 INJECTION, SOLUTION INTRAVENOUS; SUBCUTANEOUS at 09:54

## 2020-03-13 RX ADMIN — INSULIN LISPRO 6 UNITS: 100 INJECTION, SOLUTION INTRAVENOUS; SUBCUTANEOUS at 12:46

## 2020-03-13 RX ADMIN — DICYCLOMINE HYDROCHLORIDE 10 MG: 10 CAPSULE ORAL at 09:53

## 2020-03-13 RX ADMIN — AMLODIPINE BESYLATE 2.5 MG: 2.5 TABLET ORAL at 09:53

## 2020-03-13 RX ADMIN — PIOGLITAZONE 30 MG: 30 TABLET ORAL at 09:53

## 2020-03-13 RX ADMIN — SODIUM CHLORIDE: 9 INJECTION, SOLUTION INTRAVENOUS at 04:35

## 2020-03-13 RX ADMIN — PANTOPRAZOLE SODIUM 40 MG: 40 TABLET, DELAYED RELEASE ORAL at 09:53

## 2020-03-13 RX ADMIN — HYDROCODONE BITARTRATE AND ACETAMINOPHEN 1 TABLET: 5; 325 TABLET ORAL at 09:53

## 2020-03-13 RX ADMIN — SUCRALFATE 1 G: 1 TABLET ORAL at 09:52

## 2020-03-13 RX ADMIN — INSULIN GLARGINE 80 UNITS: 100 INJECTION, SOLUTION SUBCUTANEOUS at 09:54

## 2020-03-13 RX ADMIN — GABAPENTIN 300 MG: 300 CAPSULE ORAL at 12:46

## 2020-03-13 RX ADMIN — MORPHINE SULFATE 2 MG: 2 INJECTION, SOLUTION INTRAMUSCULAR; INTRAVENOUS at 04:35

## 2020-03-13 RX ADMIN — SERTRALINE 50 MG: 50 TABLET, FILM COATED ORAL at 09:52

## 2020-03-13 RX ADMIN — DICYCLOMINE HYDROCHLORIDE 10 MG: 10 CAPSULE ORAL at 12:46

## 2020-03-13 RX ADMIN — SUCRALFATE 1 G: 1 TABLET ORAL at 12:46

## 2020-03-13 RX ADMIN — GABAPENTIN 300 MG: 300 CAPSULE ORAL at 09:53

## 2020-03-13 RX ADMIN — METOCLOPRAMIDE 10 MG: 10 TABLET ORAL at 09:53

## 2020-03-13 ASSESSMENT — PAIN DESCRIPTION - LOCATION
LOCATION: ABDOMEN
LOCATION: ABDOMEN

## 2020-03-13 ASSESSMENT — PAIN DESCRIPTION - ORIENTATION
ORIENTATION: INNER
ORIENTATION: INNER

## 2020-03-13 ASSESSMENT — PAIN DESCRIPTION - PROGRESSION

## 2020-03-13 ASSESSMENT — PAIN SCALES - GENERAL
PAINLEVEL_OUTOF10: 7
PAINLEVEL_OUTOF10: 6
PAINLEVEL_OUTOF10: 4
PAINLEVEL_OUTOF10: 0

## 2020-03-13 ASSESSMENT — PAIN DESCRIPTION - PAIN TYPE
TYPE: ACUTE PAIN
TYPE: ACUTE PAIN

## 2020-03-13 ASSESSMENT — PAIN DESCRIPTION - DESCRIPTORS
DESCRIPTORS: ACHING
DESCRIPTORS: ACHING

## 2020-03-13 ASSESSMENT — PAIN DESCRIPTION - ONSET
ONSET: ON-GOING
ONSET: ON-GOING

## 2020-03-13 ASSESSMENT — PAIN DESCRIPTION - FREQUENCY
FREQUENCY: CONTINUOUS
FREQUENCY: CONTINUOUS

## 2020-03-13 NOTE — DISCHARGE SUMMARY
Hospitalist Discharge Summary        Patient: Antonia Monique  YOB: 1970  MRN: [id number]   Acct: [de-identified]    Primary Care Physician: MARILYN Saba CNP    Admit date  3/12/2020    Discharge date:  3/13/2020 10:33 AM  Discharge Diagnoses: Active Hospital Problems    Diagnosis Date Noted    Elevated lipase [R74.8] 03/12/2020       Code Status:  Full Code    Chief Complaint on presentation :-  Abdominal pain      Initial admission HPI as below:-  Mr. Samantha Gay is a 43 yo male with a history of Type II DM, GERD, HTN, and depression who presented to the ED on 03/11/20 with complaint of abdominal pain. He states that pain started about 3 weeks ago and gets worse after he eats. Pain is generalized throughout the abdomen but worst in the RUQ. He reports having black tarry stools, but no BRBPR, nausea, or vomiting. He was evaluated by the ED and hospitalist service several times this month for the same complaint. GI was consulted - EGD with dilation on 03/03/20 showed no abnormalities, recommended Protonix BID and sucralfate QID. Most recent CT abd/pelvis on 03/08/20 showed fatty liver, enlarged prostate, small gallstones, no acute cholecystitis. FOBT was negative. Patient saw PCP yesterday for follow up and found to have lipase of 145.4 and was admitted on 03/12/20. On admission, he reported 8/10 pain in the RUQ that gets worse with eating, some nausea, no vomiting, and black tarry stools. Repeat lipase on the same day was 27.5. Gallbladder US showed fatty liver and small gravel-like stones, no dilation of CBD. Hemoglobin stable. Patient stated that he is interested in surgery to remove the gallbladder and reports that he has a consult scheduled with Dr. Sarai Sims on 03/24/20. GI consulted - continue Protonix, Carafate, Bentyl, stable to discharge, colonoscopy scheduled with Dr. Jannie Blanc on 03/27/20. Today lipase is 16.9.  He still complains of pain in the RUQ that worsens after --     Weight:   Weight: 228 lb (103.4 kg)   24 hour intake/output:     Intake/Output Summary (Last 24 hours) at 3/13/2020 1033  Last data filed at 3/12/2020 2152  Gross per 24 hour   Intake 793 ml   Output 700 ml   Net 93 ml       1. General appearance: No apparent distress, appears stated age and cooperative. 2. HEENT: Normal cephalic, atraumatic without obvious deformity. Pupils equal, round, and reactive to light. Extra ocular muscles intact. Conjunctivae/corneas clear. 3. Neck: Supple, with full range of motion. No jugular venous distention. Trachea midline. 4. Respiratory:  Normal respiratory effort. Clear to auscultation, bilaterally without Rales/Wheezes/Rhonchi. 5. Cardiovascular: Regular rate and rhythm with normal S1/S2 without murmurs, rubs or gallops. 6. Abdomen: Soft, non-distended with normal bowel sounds. Mild tenderness to palpation in the RUQ. 7. Musculoskeletal: R above knee amputee. No clubbing, cyanosis or edema in the LLE. 8. Skin: Skin color, texture, turgor normal.  No rashes or lesions. 9. Neurologic:  Neurovascularly intact without any focal sensory/motor deficits. Cranial nerves: II-XII intact, grossly non-focal.  10. Psychiatric: Alert and oriented, thought content appropriate, normal insight  11. Capillary Refill: Brisk,< 3 seconds   12. Peripheral Pulses: radial +2 palpable, equal bilaterally       Discharge Medications:-      Medication List      ASK your doctor about these medications    AMINO ACIDS COMPLEX PO     amLODIPine 2.5 MG tablet  Commonly known as:  NORVASC  Take 1 tablet by mouth daily     blood glucose monitor kit and supplies  Accu Chek Sheila meter.  Use to test blood sugars DX:E11.65     blood glucose test strips  Accucheck Sheila Test Strips Test blood sugars 4 times daily DX:E11.65     dicyclomine 10 MG capsule  Commonly known as:  BENTYL  Take 1 capsule by mouth 4 times daily     gabapentin 300 MG capsule  Commonly known as:  NEURONTIN  TAKE 1 CAPSULE BY MOUTH 1.3 thou/mm3    Eosinophils Absolute 0.5 (H) 0.0 - 0.4 thou/mm3    Basophils Absolute 0.1 0.0 - 0.1 thou/mm3    Immature Grans (Abs) 0.11 (H) 0.00 - 0.07 thou/mm3    nRBC 0 /100 wbc   Reticulocytes    Collection Time: 03/10/20  3:08 PM   Result Value Ref Range    Retic Ct Abs 2.7 (H) 0.5 - 2.0 %    Absolute Retic # 139.0 (H) 20.0 - 115.0 thou/mm3    Immature Retic Fract 11.1 2.3 - 13.4 %    Retic Hemoglobin 34.5 28.2 - 35.7 pg   Comprehensive Metabolic Panel    Collection Time: 03/10/20  3:08 PM   Result Value Ref Range    Glucose 222 (H) 70 - 108 mg/dL    CREATININE 0.7 0.4 - 1.2 mg/dL    BUN 10 7 - 22 mg/dL    Sodium 135 135 - 145 meq/L    Potassium 4.3 3.5 - 5.2 meq/L    Chloride 94 (L) 98 - 111 meq/L    CO2 25 23 - 33 meq/L    Calcium 9.6 8.5 - 10.5 mg/dL    AST 29 5 - 40 U/L    Alkaline Phosphatase 74 38 - 126 U/L    Total Protein 8.3 (H) 6.1 - 8.0 g/dL    Alb 4.2 3.5 - 5.1 g/dL    Total Bilirubin 0.4 0.3 - 1.2 mg/dL    ALT 24 11 - 66 U/L   Anion Gap    Collection Time: 03/10/20  3:08 PM   Result Value Ref Range    Anion Gap 16.0 8.0 - 16.0 meq/L   Glomerular Filtration Rate, Estimated    Collection Time: 03/10/20  3:08 PM   Result Value Ref Range    Est, Glom Filt Rate >90 ml/min/1.73m2   Blood occult stool screen #1    Collection Time: 03/11/20  5:14 PM   Result Value Ref Range    OCCULT BLOOD FECAL Negative    CBC Auto Differential    Collection Time: 03/11/20  5:43 PM   Result Value Ref Range    WBC 11.7 (H) 4.8 - 10.8 thou/mm3    RBC 5.06 4.70 - 6.10 mill/mm3    Hemoglobin 15.3 14.0 - 18.0 gm/dl    Hematocrit 42.1 42.0 - 52.0 %    MCV 83.2 80.0 - 94.0 fL    MCH 30.2 26.0 - 33.0 pg    MCHC 36.3 (H) 32.2 - 35.5 gm/dl    RDW-CV 13.8 11.5 - 14.5 %    RDW-SD 40.6 35.0 - 45.0 fL    Platelets 888 573 - 186 thou/mm3    MPV 9.6 9.4 - 12.4 fL    Seg Neutrophils 62.5 %    Lymphocytes 23.5 %    Monocytes 7.4 %    Eosinophils 5.1 %    Basophils 0.7 %    Immature Granulocytes 0.8 %    Segs Absolute 7.3 1.8 - 7.7 thou/mm3    Lymphocytes Absolute 2.7 1.0 - 4.8 thou/mm3    Monocytes Absolute 0.9 0.4 - 1.3 thou/mm3    Eosinophils Absolute 0.6 (H) 0.0 - 0.4 thou/mm3    Basophils Absolute 0.1 0.0 - 0.1 thou/mm3    Immature Grans (Abs) 0.09 (H) 0.00 - 0.07 thou/mm3    nRBC 0 /100 wbc   Comprehensive Metabolic Panel    Collection Time: 03/11/20  6:15 PM   Result Value Ref Range    Glucose 285 (H) 70 - 108 mg/dL    CREATININE 0.8 0.4 - 1.2 mg/dL    BUN 14 7 - 22 mg/dL    Sodium 133 (L) 135 - 145 meq/L    Potassium 4.1 3.5 - 5.2 meq/L    Chloride 96 (L) 98 - 111 meq/L    CO2 23 23 - 33 meq/L    Calcium 9.5 8.5 - 10.5 mg/dL    AST 23 5 - 40 U/L    Alkaline Phosphatase 72 38 - 126 U/L    Total Protein 7.8 6.1 - 8.0 g/dL    Alb 4.2 3.5 - 5.1 g/dL    Total Bilirubin 0.3 0.3 - 1.2 mg/dL    ALT 22 11 - 66 U/L   Lipase    Collection Time: 03/11/20  6:15 PM   Result Value Ref Range    Lipase 145.4 (H) 5.6 - 51.3 U/L   Troponin    Collection Time: 03/11/20  6:15 PM   Result Value Ref Range    Troponin T < 0.010 ng/ml   Anion Gap    Collection Time: 03/11/20  6:15 PM   Result Value Ref Range    Anion Gap 14.0 8.0 - 16.0 meq/L   Glomerular Filtration Rate, Estimated    Collection Time: 03/11/20  6:15 PM   Result Value Ref Range    Est, Glom Filt Rate >90 ml/min/1.73m2   Osmolality    Collection Time: 03/11/20  6:15 PM   Result Value Ref Range    Osmolality Calc 277.2 275.0 - 300 mOsmol/kg   Amylase    Collection Time: 03/12/20 12:06 PM   Result Value Ref Range    Amylase 38 20 - 104 U/L   Hepatic Function Panel    Collection Time: 03/12/20 12:06 PM   Result Value Ref Range    Alb 4.0 3.5 - 5.1 g/dL    Total Bilirubin 0.4 0.3 - 1.2 mg/dL    Bilirubin, Direct <0.2 0.0 - 0.3 mg/dL    Alkaline Phosphatase 74 38 - 126 U/L    AST 29 5 - 40 U/L    ALT 28 11 - 66 U/L    Total Protein 8.2 (H) 6.1 - 8.0 g/dL   Lipase    Collection Time: 03/12/20 12:06 PM   Result Value Ref Range    Lipase 27.5 5.6 - 51.3 U/L   CBC    Collection Time: 03/12/20  3:16 PM Result Value Ref Range    WBC 12.1 (H) 4.8 - 10.8 thou/mm3    RBC 5.12 4.70 - 6.10 mill/mm3    Hemoglobin 15.1 14.0 - 18.0 gm/dl    Hematocrit 44.9 42.0 - 52.0 %    MCV 87.7 80.0 - 94.0 fL    MCH 29.5 26.0 - 33.0 pg    MCHC 33.6 32.2 - 35.5 gm/dl    RDW-CV 14.1 11.5 - 14.5 %    RDW-SD 45.0 35.0 - 45.0 fL    Platelets 772 128 - 029 thou/mm3    MPV 10.1 9.4 - 12.4 fL   Basic Metabolic Panel    Collection Time: 03/12/20  3:16 PM   Result Value Ref Range    Sodium 132 (L) 135 - 145 meq/L    Potassium 4.4 3.5 - 5.2 meq/L    Chloride 95 (L) 98 - 111 meq/L    CO2 19 (L) 23 - 33 meq/L    Glucose 261 (H) 70 - 108 mg/dL    BUN 16 7 - 22 mg/dL    CREATININE 0.8 0.4 - 1.2 mg/dL    Calcium 9.5 8.5 - 10.5 mg/dL   Anion Gap    Collection Time: 03/12/20  3:16 PM   Result Value Ref Range    Anion Gap 18.0 (H) 8.0 - 16.0 meq/L   Glomerular Filtration Rate, Estimated    Collection Time: 03/12/20  3:16 PM   Result Value Ref Range    Est, Glom Filt Rate >90 ml/min/1.73m2   POCT glucose    Collection Time: 03/12/20  4:59 PM   Result Value Ref Range    POC Glucose 324 (H) 70 - 108 mg/dl   POCT glucose    Collection Time: 03/12/20  9:21 PM   Result Value Ref Range    POC Glucose 212 (H) 70 - 108 mg/dl   Basic Metabolic Panel w/ Reflex to MG    Collection Time: 03/13/20  4:29 AM   Result Value Ref Range    Sodium 135 135 - 145 meq/L    Potassium reflex Magnesium 3.6 3.5 - 5.2 meq/L    Chloride 101 98 - 111 meq/L    CO2 20 (L) 23 - 33 meq/L    Glucose 287 (H) 70 - 108 mg/dL    BUN 19 7 - 22 mg/dL    CREATININE 0.9 0.4 - 1.2 mg/dL    Calcium 8.8 8.5 - 10.5 mg/dL   Hepatic function panel    Collection Time: 03/13/20  4:29 AM   Result Value Ref Range    Alb 3.7 3.5 - 5.1 g/dL    Total Bilirubin 0.3 0.3 - 1.2 mg/dL    Bilirubin, Direct <0.2 0.0 - 0.3 mg/dL    Alkaline Phosphatase 62 38 - 126 U/L    AST 23 5 - 40 U/L    ALT 22 11 - 66 U/L    Total Protein 7.0 6.1 - 8.0 g/dL   Lipase    Collection Time: 03/13/20  4:29 AM   Result Value Ref Range Lipase 16.9 5.6 - 51.3 U/L   Anion Gap    Collection Time: 03/13/20  4:29 AM   Result Value Ref Range    Anion Gap 14.0 8.0 - 16.0 meq/L   Glomerular Filtration Rate, Estimated    Collection Time: 03/13/20  4:29 AM   Result Value Ref Range    Est, Glom Filt Rate 89 (A) ml/min/1.73m2   POCT glucose    Collection Time: 03/13/20  8:13 AM   Result Value Ref Range    POC Glucose 273 (H) 70 - 108 mg/dl        Microbiology:    Blood culture #1:   Lab Results   Component Value Date    BC No growth-preliminary No growth  12/18/2019       Blood culture #2:No results found for: BLOODCULT2    Organism:    Lab Results   Component Value Date    LABGRAM  04/04/2019     No segmented neutrophils observed. No epithelial cells observed. No bacteria seen. MRSA culture only:No results found for: [facility]    Urine culture: No results found for: Meryl Curiel  Lab Results   Component Value Date    ORG Mixed Growth 09/03/2019        Respiratory culture: No results found for: CULTRESP    Aerobic and Anaerobic :  Lab Results   Component Value Date    LABAERO  04/04/2019     light growth  Group B streptococci are suspectible to ampicillin,  penicillin and cefazolin. Lab Results   Component Value Date    LABANAE  04/04/2019     No anaerobes isolated- preliminary  No anaerobes isolated         Urinalysis:      Lab Results   Component Value Date    NITRU NEGATIVE 03/08/2020    WBCUA 0-2 03/02/2020    BACTERIA NONE SEEN 03/02/2020    RBCUA 0-2 03/02/2020    BLOODU NEGATIVE 03/08/2020    SPECGRAV >1.030 06/06/2019    GLUCOSEU >= 1000 03/08/2020       Radiology:-  Us Gallbladder Ruq    Result Date: 3/13/2020  PROCEDURE: US GALLBLADDER RUQ CLINICAL INFORMATION: abd pain, nausea. COMPARISON: CT abdomen pelvis March 8, 2020 TECHNIQUE: Multiplanar sonographic images were obtained of the structures in the right upper quadrant.  FINDINGS: The liver measures 16.8 cm with diffuse increased echogenicity correlate with fatty or other parenchymal UNIT/ML injection pen  Inject 20 Units into the skin 3 times daily (before meals)             insulin detemir (LEVEMIR FLEXTOUCH) 100 UNIT/ML injection pen  Inject 80 Units into the skin 2 times daily             Insulin Syringe-Needle U-100 29G X 1/2\" 0.3 ML MISC  1 each by Does not apply route 4 times daily (after meals and at bedtime)             Lancets MISC  Use to test blood sugars 4 times daily DX:E11.65             metoclopramide (REGLAN) 10 MG tablet  Take 1 tablet by mouth 4 times daily             pantoprazole (PROTONIX) 40 MG tablet  Take 1 tablet by mouth 2 times daily (before meals)             pioglitazone (ACTOS) 30 MG tablet  Take 1 tablet by mouth daily             polyethylene glycol (GLYCOLAX) packet  Take 17 g by mouth daily as needed for Constipation             sertraline (ZOLOFT) 50 MG tablet  Take 1 tablet by mouth daily             sucralfate (CARAFATE) 1 GM tablet  Take 1 tablet by mouth 4 times daily (before meals and nightly)                 Time Spent:- 20 minutes    Electronically signed by Mahesh Jacobson on 3/13/2020 at 10:33 AM

## 2020-03-13 NOTE — CARE COORDINATION
3/13/20, 12:42 PM EDT    Patient goals/plan/ treatment preferences discussed by  and . Patient goals/plan/ treatment preferences reviewed with patient/ family. Patient/ family verbalize understanding of discharge plan and are in agreement with goal/plan/treatment preferences. Understanding was demonstrated using the teach back method. AVS provided by RN at time of discharge, which includes all necessary medical information pertaining to the patients current course of illness, treatment, post-discharge goals of care, and treatment preferences.         IMM Letter  IMM Letter date given[de-identified] 03/12/20  IMM Letter time given[de-identified] 0100

## 2020-03-13 NOTE — PROGRESS NOTES
Patient being discharged at this time. All appointments reviewed with patient. IV removed. No tele. Patient is waiting on his ride.  ,
Acid: No results for input(s): LACTA in the last 72 hours. Physical Exam:  Vitals:    03/13/20 0430   BP: 132/69   Pulse: 78   Resp: 17   Temp: 98.2 °F (36.8 °C)   SpO2: 96%     GEN: Well nourished, well developed. LUNGS:  Clear to auscultation bilaterally. Chest rises equally on inspiration. CARDIOVASCULAR:  Regular rate and rhythm without murmurs, rubs or gallops. ABDOMEN:  Semi-Soft, moderately larger, and nondistended with normal bowel sounds. HEAD:  Normal cephalic/atraumatic. NECK:  Neck supple. Trachea midline      UPPER EXTREMITIES:  No cyanosis, clubbing, or edema. DERM:  No rash or jaundice. LOWER EXTREMITIES:  No cyanosis, clubbing, or edema. NEURO:  Alert and oriented x4. Patient moves all extremities and has gross sensation in all extremities.          Medications:  Scheduled Meds:   amLODIPine  2.5 mg Oral Daily    dicyclomine  10 mg Oral 4x Daily    gabapentin  300 mg Oral TID    insulin lispro  20 Units Subcutaneous TID WC    metoclopramide  10 mg Oral 4x Daily    pantoprazole  40 mg Oral BID AC    pioglitazone  30 mg Oral Daily    sertraline  50 mg Oral Daily    sucralfate  1 g Oral 4x Daily AC & HS    sodium chloride flush  10 mL Intravenous 2 times per day    enoxaparin  40 mg Subcutaneous Q24H    insulin lispro  0-12 Units Subcutaneous TID WC    insulin lispro  0-6 Units Subcutaneous Nightly    insulin glargine  80 Units Subcutaneous BID     Continuous Infusions:   sodium chloride 75 mL/hr at 03/13/20 0435    dextrose       PRN Meds:polyethylene glycol, sodium chloride flush, acetaminophen, promethazine **OR** ondansetron, potassium chloride **OR** potassium alternative oral replacement **OR** potassium chloride, magnesium sulfate, morphine, HYDROcodone 5 mg - acetaminophen, glucose, dextrose, glucagon (rDNA), dextrose    US GB/RUQ:     FINDINGS:   The liver measures 16.8 cm with diffuse increased echogenicity correlate with fatty or other

## 2020-03-13 NOTE — PLAN OF CARE
Problem: Pain:  Goal: Pain level will decrease  Description: Pain level will decrease  Outcome: Ongoing  Note: Patient at risk for increase in pain level d/t chronic pain. Patient's pain goal is 0/10. Current interventions in place to manage acute and/or chronic pain level include pain medication and repositioning. Patient accepting pain interventions without difficulty. Problem: Falls - Risk of:  Goal: Will remain free from falls  Description: Will remain free from falls  Outcome: Ongoing  Note: Patient at risk for falls due to iv therapy. Fall assessment completed. Patient uses call light for needs this shift. Fall band in place on patient's arm. Fall signs posted. Bed alarm in place and functioning properly. Care plan reviewed with patient. Patient verbalized understanding of the care plan and contribute to goal setting.

## 2020-03-13 NOTE — CARE COORDINATION
DISCHARGE/PLANNING EVALUATION  3/13/20, 10:26 AM EDT    Reason for Referral: Patient has issues with transportation to appointments. Mental Status: Alert and oriented to person, place and time. Decision Making: Independent with decision making. Family/Social/Home Environment: Lives in a sober living house with about 15 roommates. He has a limited support system. Current Services including food security, transportation and housekeeping: He has a  that helps with transportation to appointments. He however does not have his prosthetic leg at this time and needs wheelchair transport. Current Equipment:wheelchair, rolling walker, shower bench and ramp  Payment Source:Medicare and Medicaid  Concerns or Barriers to Discharge: No   Post acute provider list with quality measures, geographic area and applicable managed care information provided. Questions regarding selection process answered:NA    Teach Back Method used with Jaime Johnston  regarding care plan and discharge planning. Patient verbalized understanding of the plan of care and contribute to goal setting. Patient goals, treatment preferences and discharge plan: Jaime Johnston is having difficulty finding transportation. He has to go by wheelchair Argenis Roth due to his prosthetic leg being fixed. PÉREZ gave him information for the Find-A-Ride program.  PÉREZ suggested he call today if he knows of any appointments. He denies other needs at this time. Electronically signed by NEFTALI Pollard on 3/13/2020 at 10:26 AM

## 2020-03-14 ENCOUNTER — CARE COORDINATION (OUTPATIENT)
Dept: CASE MANAGEMENT | Age: 50
End: 2020-03-14

## 2020-03-14 NOTE — CARE COORDINATION
[facility] Transitions Initial Follow Up Call    Call within 2 business days of discharge: Yes    Patient: Susan Luu Patient : 1970   MRN: [id number]  Reason for Admission: Elevated Lipase [R74.8]  Discharge Date: 3/13/20 RARS: Readmission Risk Score: 62      Last Discharge [facility]       Complaint Diagnosis Description Type Department Provider    3/12/20   Admission (Discharged) Hakeem Najera MD           First attempt to contact patient for initial Care Transition follow up. Message left for patient to return call. Contact information for CTN provided. Reminded patient of scheduled PCP appointment on 2020. CTN will continue to follow.      Facility: [address] [place] [address]    Follow Up  Future Appointments   Date Time Provider Ebenezer Carmichael   3/17/2020 11:20 AM MARILYN Steele - CNP [facility] Physic [facility] [place]   3/17/2020  1:00 PM Aisha Sabillon PTA [facility] H[facility]   3/19/2020  1:00 PM Susan Pedersen PTA [facility]   3/23/2020  8:30 AM Helena Reyez MD [facility] Urology [facility]   3/24/2020 10:30 AM Leo Yoder MD [id number] [facility] [place]   3/24/2020  1:00 PM Aisha Sabillon PTA [facility] [place] [facility]   3/26/2020  1:00 PM Susan Pedersen PTA [facility] HOD   3/30/2020  1:00 PM Alexy Corey PT [facility] H[facility]   2020  2:00 PM MARILYN Steele CNP [facility] [place]   2020  2:30 PM Gracy Kraus PA-C [address], RN  Care Transition Nurse  [phone]  21

## 2020-03-16 ENCOUNTER — CARE COORDINATION (OUTPATIENT)
Dept: CARE COORDINATION | Age: 50
End: 2020-03-16

## 2020-03-16 ENCOUNTER — CARE COORDINATION (OUTPATIENT)
Dept: CASE MANAGEMENT | Age: 50
End: 2020-03-16

## 2020-03-16 PROCEDURE — 1111F DSCHRG MED/CURRENT MED MERGE: CPT | Performed by: NURSE PRACTITIONER

## 2020-03-16 NOTE — TELEPHONE ENCOUNTER
Pt notified that as of now routine f/u appts are being cancelled to reduce risk of exposure. Has had workup completed all ready by PCP and hospital regarding symptoms. PCP recommends continuing treatment as recommended by GI: continue  Protonix, Carafate, and Reglan. Also recommend taking the Bentyl as he was able to tolerate it in the hospital.  Advised to f/u with GI and surgery as scheduled. Pt very short on phone. Verbalizes understanding and ended call.

## 2020-03-16 NOTE — CARE COORDINATION
Received call from Diamond Broderick today upset that his appt for tomorrow was cancelled. Pt states that he continues to have diarrhea, RUQ pain, n/v, and states that he is still seeing blood in his emesis. Pt states that has has been able to keep food down. Has pain with eating. Taking Reglan. Continues to not take Bentyl as he states this makes his stomach hurt even more. Pt has colonoscopy scheduled for 3/27. Pt reports that he can come in to office for appt today or tomorrow. Please advise.

## 2020-03-16 NOTE — CARE COORDINATION
[facility] Up [facility]    Call within 2 business days of discharge: Yes    Patient: Dawit Aparicio Patient : 1970   MRN: [id number]  Reason for Admission: Elevated Lipase  Discharge Date: 3/13/20 RARS: Readmission Risk Score: 62      Last Discharge [facility]       Complaint Diagnosis Description Type Department Provider    3/12/20   Admission (Discharged) Francine Doss MD           Spoke with: Dawit Aparicio  Patient stated he still has right upper quadrant pain due to gallbladder stones. He has not been out of pain and doesn't now why he was discharged. Patient is also upset that he has been vomiting blood and blood in his BM and no one will do a colonoscopy because the hospital with not allow it. The doctor said he would do it but the hospital says no. Patient missed his colonoscopy appointment and it is rescheduled for 3/27/2020 at 1 pm. GI recommend him to take his PPI and Carafate with his bentyl. Patient has and appt with Dr Ton Garsia for his gallbladder on . Patient states they will not take his gallbladder out right now because of the COVID-19 going around and they are prolonging his surgery. Reviewed medications and completed 1111F. Asked patient 3 times what his FBS was this morning but he did not answer. He just was telling me he take neurontin daily and it doesn't help all his pain he has but no one will give him more. Advised patient to contact his PCP to discuss his issues. Patient states he checks his BS 4 times a day. Reviewed appts and patient states he has transportation. Patient denies any other concerns or issues at this time.     Facility: [facility]    Non-face-to-face services provided:  Obtained and reviewed discharge summary and/or continuity of care documents    Care Transitions 24 Hour Call    Do you have any ongoing symptoms?:  No  Do you have a copy of your discharge instructions?:  Yes  Do you have all of your prescriptions and are they filled?:  Yes  Have you been contacted by a [address]?:  No  Have you scheduled your follow up appointment?:  No  Were you discharged with any Home Care or Post Acute Services:  No  Patient DME:  Shower chair, Walker, Wheelchair, Other  Other Patient DME:  Entrance Ramp  Do you have support at home?:  Other Caregiver  Do you feel like you have everything you need to keep you well at home?:  No  Are you an active caregiver in your home?:  No  Care Transitions Interventions  No Identified Needs         Follow Up  Future Appointments   Date Time Provider Ebenezer Carmichael   3/17/2020  1:00 PM Milagro Richards, PTA STRZ PT [facility]   3/19/2020  1:00 PM Le Paz PTA STRZ PT [facility]OD   3/23/2020  8:30 AM Rao Mayfield MD [facility] Urology [facility]   3/24/2020 10:30 AM Charlee Berry MD [id number] [address]   3/24/2020  1:00 PM [facility]   3/26/2020  1:00 PM Le Paz, PTA STRZ PT [facility]   3/30/2020  1:00 PM Nona Marsh PT STRZ PT [facility]   4/1/2020  2:00 PM MARILYN Segal CNP [facility] Physic [address]   4/13/2020 11:20 AM MARILYN Segal CNP [facility] Physic [facility]   4/14/2020  2:30 PM Jannet Christiansen PA-C [address] Mehul Richey, RN

## 2020-03-16 NOTE — CARE COORDINATION
We are cancelling as many of the routine follow up as a possible to reduce risk for our patients with the current viral infection that is circulating. He has had everything worked up that is possible to workup both by me and the hospital for this concern. I would follow their recommendations. Continue Protonix, Carafate and Reglan. He had bentyl in the hospital x4 doses and appears he tolerated this, I would try this as advised by GI. Follow up with GI and surgery as planned.

## 2020-03-18 ENCOUNTER — TELEPHONE (OUTPATIENT)
Dept: INTERNAL MEDICINE CLINIC | Age: 50
End: 2020-03-18

## 2020-03-18 NOTE — TELEPHONE ENCOUNTER
Spoke with pt and he voiced he understood recommendations but will be coming in to see Winsome Montejo on 03/19/20 to discuss further

## 2020-03-18 NOTE — TELEPHONE ENCOUNTER
----- Message from MARILYN Summers CNP sent at 3/17/2020  2:30 PM EDT -----  Reviewed results from [facility]. Needs stool panel for pathogens and stool culture.

## 2020-03-19 ENCOUNTER — CARE COORDINATION (OUTPATIENT)
Dept: CARE COORDINATION | Age: 50
End: 2020-03-19

## 2020-03-19 ENCOUNTER — TELEPHONE (OUTPATIENT)
Dept: INTERNAL MEDICINE CLINIC | Age: 50
End: 2020-03-19

## 2020-03-19 ENCOUNTER — OFFICE VISIT (OUTPATIENT)
Dept: INTERNAL MEDICINE CLINIC | Age: 50
End: 2020-03-19
Payer: MEDICARE

## 2020-03-19 ENCOUNTER — HOSPITAL ENCOUNTER (OUTPATIENT)
Dept: PHYSICAL THERAPY | Age: 50
Setting detail: THERAPIES SERIES
Discharge: HOME | End: 2020-03-19
Payer: MEDICARE

## 2020-03-19 VITALS
SYSTOLIC BLOOD PRESSURE: 138 MMHG | HEIGHT: 66 IN | BODY MASS INDEX: 38.09 KG/M2 | RESPIRATION RATE: 12 BRPM | HEART RATE: 92 BPM | DIASTOLIC BLOOD PRESSURE: 82 MMHG | TEMPERATURE: 97.6 F | WEIGHT: 237 LBS | OXYGEN SATURATION: 99 %

## 2020-03-19 PROCEDURE — 99215 OFFICE O/P EST HI 40 MIN: CPT | Performed by: NURSE PRACTITIONER

## 2020-03-19 PROCEDURE — 1111F DSCHRG MED/CURRENT MED MERGE: CPT | Performed by: NURSE PRACTITIONER

## 2020-03-19 PROCEDURE — G8484 FLU IMMUNIZE NO ADMIN: HCPCS | Performed by: NURSE PRACTITIONER

## 2020-03-19 PROCEDURE — 1036F TOBACCO NON-USER: CPT | Performed by: NURSE PRACTITIONER

## 2020-03-19 PROCEDURE — G8417 CALC BMI ABV UP PARAM F/U: HCPCS | Performed by: NURSE PRACTITIONER

## 2020-03-19 PROCEDURE — 2022F DILAT RTA XM EVC RTNOPTHY: CPT | Performed by: NURSE PRACTITIONER

## 2020-03-19 PROCEDURE — G8427 DOCREV CUR MEDS BY ELIG CLIN: HCPCS | Performed by: NURSE PRACTITIONER

## 2020-03-19 PROCEDURE — 3052F HG A1C>EQUAL 8.0%<EQUAL 9.0%: CPT | Performed by: NURSE PRACTITIONER

## 2020-03-19 PROCEDURE — 3017F COLORECTAL CA SCREEN DOC REV: CPT | Performed by: NURSE PRACTITIONER

## 2020-03-19 RX ORDER — INSULIN ASPART 100 [IU]/ML
22 INJECTION, SOLUTION INTRAVENOUS; SUBCUTANEOUS
Qty: 5 PEN | Refills: 3 | Status: SHIPPED
Start: 2020-03-19 | End: 2020-04-20 | Stop reason: DRUGHIGH

## 2020-03-19 ASSESSMENT — ENCOUNTER SYMPTOMS
SHORTNESS OF BREATH: 0
NAUSEA: 0
SORE THROAT: 0
TROUBLE SWALLOWING: 0
CONSTIPATION: 0
VOMITING: 0
ABDOMINAL PAIN: 1
COUGH: 0
CHOKING: 0
DIARRHEA: 0
EYE ITCHING: 0

## 2020-03-19 NOTE — PROGRESS NOTES
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 Past Surgical History  The patient  has a past surgical history that includes other surgical history (Right, 1/14/15); Abscess Drainage (Right); Toe amputation (Right, 1/16/15); Foot Debridement (Right, 07/01/2016); Leg amputation below knee (Right, 07/20/2016); [place] tooth extraction (?when); pr drain infect shoulder bursa (Left, 8/18/2017); pr office/outpt visit,procedure only (Right, 9/20/2018); incision and drainage (Right, 2/18/2019); Leg amputation below knee (Right, 4/4/2019); AMPUTATION ABOVE KNEE (Right, 4/18/2019); and Upper gastrointestinal endoscopy (N/A, 3/3/2020). Family History  This patient's family history includes Arthritis in his maternal grandfather; Depression in his mother; Diabetes in his mother; Early Death in his mother; Heart Disease in his maternal grandfather; High Blood Pressure in his mother; High Cholesterol in his mother; Other in his mother; Vision Loss in his maternal grandmother. Social History  Huang Frazier  reports that he quit smoking about 35 years ago. His smoking use included cigars. He has a 65.00 pack-year smoking history. He has never used smokeless tobacco. He reports previous alcohol use. He reports that he does not use drugs.     Health Maintenance:    Health Maintenance   Topic Date Due    Hepatitis B vaccine (1 of 3 - Risk 3-dose series) 01/23/1987    DTaP/Tdap/Td vaccine (1 - Tdap) 01/23/1987    Shingles Vaccine (1 of 2) 01/23/2018    Colon cancer screen colonoscopy  01/23/2018    Diabetic retinal exam  05/09/2019    Annual Wellness Visit (AWV)  06/04/2019    Diabetic microalbuminuria test 10/10/2019    Lipid screen  10/10/2019    Diabetic foot exam  02/01/2020    A1C test (Diabetic or Prediabetic)  03/03/2021    Potassium monitoring  03/17/2021    Creatinine monitoring  03/17/2021    Flu vaccine  Completed    Pneumococcal 0-64 years Vaccine  Completed    HIV screen  Completed    Hepatitis A vaccine  Aged Out    Hib vaccine  Aged Out    Meningococcal (ACWY) vaccine  Aged Out       Subjective:      Review of Systems   Constitutional: Positive for fatigue. Negative for chills and fever. HENT: Negative for sore throat and trouble swallowing. Eyes: Negative for itching and visual disturbance. Respiratory: Negative for cough, choking and shortness of breath. Cardiovascular: Negative for chest pain and leg swelling. Gastrointestinal: Positive for abdominal pain (Mild LUQ/RUQ). Negative for constipation, diarrhea, nausea and vomiting. Endocrine: Negative for cold intolerance and heat intolerance. Genitourinary: Negative for difficulty urinating and dysuria. Musculoskeletal: Negative for arthralgias and myalgias. In wheelchair, s/p right leg AKA   Skin: Negative for rash and wound. Neurological: Negative for dizziness, tremors, weakness, light-headedness, numbness and headaches. Psychiatric/Behavioral: Negative for agitation, dysphoric mood, sleep disturbance and suicidal ideas. The patient is not nervous/anxious. Objective:     /82 (Site: Right Upper Arm, Position: Sitting, Cuff Size: Large Adult)   Pulse 92   Temp 97.6 °F (36.4 °C) (Oral)   Resp 12   Ht 5' 5.98\" (1.676 m)   Wt 237 lb (107.5 kg)   SpO2 99%   BMI 38.27 kg/m²     Physical Exam  Vitals signs reviewed. Constitutional:       General: He is not in acute distress. Appearance: He is well-developed. He is obese. He is not ill-appearing, toxic-appearing or diaphoretic. HENT:      Head: Normocephalic and atraumatic. Mouth/Throat:      Pharynx: No oropharyngeal exudate.    Eyes: having hematemesis  Intermittently watery stools, always loose, will check for infectious etiology. Continue with plan for GI.   - Clostridium Difficile Toxin/Antigen; Future  - Ova & Parasite Id/Count #1; Future  - Culture, Stool; Future    2. Activity intolerance related to fatigue  - ECHO Complete 2D W Doppler W Color; Future  - TSH With Reflex Ft4; Future    3. Shortness of breath  No edema today, no chest pain or SOB at rest.   Only with exertion.   - ECHO Complete 2D W Doppler W Color; Future  - TSH With Reflex Ft4; Future    4. Fatigue, unspecified type    - TSH With Reflex Ft4; Future    5. Acute maxillary sinusitis, recurrence not specified  Can use Mucinex DM for cough and to thin secretions if needed  Plenty of fluids, rest.   Tylenol for fever/body aches  Salt water gargles for sore throat  Saline nasal spray 2 sprays each nostril 2-3 times daily  Wait and see and if no improvement will treat with antibiotics. Suspect viral etiology  Call [phone] for screening due to reports of fever, cough    6. Uncontrolled type 2 diabetes mellitus with hyperglycemia, with long-term current use of insulin (HCC)  Increase Novolog to 22 units with meals. Glucose logs/call in sugars in two weeks, sooner if problems. .    - insulin aspart (NOVOLOG FLEXPEN) 100 UNIT/ML injection pen; Inject 22 Units into the skin 3 times daily (before meals)  Dispense: 5 pen; Refill: 3    7. Generalized abdominal pain  Improved with reduction in fatty, greasy, fried or spicy foods. Continue with Bentyl, Reglan, Carafate, Protonix, symptoms improving. GB EF 5% on HIDA from 3/18 at [facility]  Keep appt with Dr. Aylin Cardoso office  Follow up with GI when they reschedule with pt, to have colonoscopy    8. Essential hypertension  BP today 138/82. Stable    9. Status post below knee amputation of right lower extremity ([address]  Working with PT for prosthetic training and decondtioning    10.  Health education/counseling  Pt with questions

## 2020-03-20 ENCOUNTER — CARE COORDINATION (OUTPATIENT)
Dept: CARE COORDINATION | Age: 50
End: 2020-03-20

## 2020-03-20 ENCOUNTER — NURSE ONLY (OUTPATIENT)
Dept: LAB | Age: 50
End: 2020-03-20

## 2020-03-20 LAB
T4 FREE: 1.1 NG/DL (ref 0.93–1.76)
TSH SERPL DL<=0.05 MIU/L-ACNC: 9.23 UIU/ML (ref 0.4–4.2)

## 2020-03-20 NOTE — CARE COORDINATION
Received call from pt. Contacted pt back. Pt was seen by PCP yesterday and labs were ordered. Pt reports that he went and has them completed today. Reporting intermittent diarrhea-foul smelling dark green stool. Pt continues to report abd pain. States \"I know it's my gallbladder. Is there anyway I can have surgery to have it removed now so I don't have to go all weekend in pain. It hurts whenever I eat greasy food. \" pt admits that he has not been compliant with diet recommended. States that it is hard to so b/c majority of foods fixed at restoration house are spicy, fried, high fat foods. Pt was provided with other food resources by SW yesterday for him to obtain some additional  foods to allow him to make better diet choices. Pt does receive food stamps but states that it is on $27 per mth. Pt states \"I can't eat healthy all the time. Well I can I just don't want to spend hundreds of dollars on food. \"  Advised pt that he can still eat healthy on a budget. I asked pt if he had money from his income that he gets that to go and purchase some better food options. Pt states \"yes I have extra money to spend on food but I have to wait until I get paid again. \"  Pt reports that he ate spaghetti with red sauce last night making his stomach hurt. I advised pt try to eliminate sauce if eating pasta again. Pt aware that pasta should be limited d/t his diabetes. Pt reports that he is taking the medication that has been prescribed. Pt then became short on the phone. Pt has appt scheduled with Dr. Jose Toledo on 3/24. I advised pt to reach out to office to discuss symptoms and see if able to be seen sooner. Provided pt with office number.

## 2020-03-23 ENCOUNTER — CARE COORDINATION (OUTPATIENT)
Dept: CASE MANAGEMENT | Age: 50
End: 2020-03-23

## 2020-03-23 ENCOUNTER — TELEPHONE (OUTPATIENT)
Dept: INTERNAL MEDICINE CLINIC | Age: 50
End: 2020-03-23

## 2020-03-23 ENCOUNTER — OFFICE VISIT (OUTPATIENT)
Dept: UROLOGY | Age: 50
End: 2020-03-23
Payer: MEDICARE

## 2020-03-23 VITALS — TEMPERATURE: 97.6 F | BODY MASS INDEX: 38.09 KG/M2 | HEIGHT: 66 IN | WEIGHT: 237 LBS

## 2020-03-23 LAB — POST VOID RESIDUAL (PVR): 295 ML

## 2020-03-23 PROCEDURE — 1111F DSCHRG MED/CURRENT MED MERGE: CPT | Performed by: UROLOGY

## 2020-03-23 PROCEDURE — 3017F COLORECTAL CA SCREEN DOC REV: CPT | Performed by: UROLOGY

## 2020-03-23 PROCEDURE — 99204 OFFICE O/P NEW MOD 45 MIN: CPT | Performed by: UROLOGY

## 2020-03-23 PROCEDURE — G8484 FLU IMMUNIZE NO ADMIN: HCPCS | Performed by: UROLOGY

## 2020-03-23 PROCEDURE — 51798 US URINE CAPACITY MEASURE: CPT | Performed by: UROLOGY

## 2020-03-23 PROCEDURE — G8427 DOCREV CUR MEDS BY ELIG CLIN: HCPCS | Performed by: UROLOGY

## 2020-03-23 PROCEDURE — G8417 CALC BMI ABV UP PARAM F/U: HCPCS | Performed by: UROLOGY

## 2020-03-23 PROCEDURE — 1036F TOBACCO NON-USER: CPT | Performed by: UROLOGY

## 2020-03-23 RX ORDER — TAMSULOSIN HYDROCHLORIDE 0.4 MG/1
0.4 CAPSULE ORAL DAILY
Qty: 30 CAPSULE | Refills: 11 | Status: ON HOLD | OUTPATIENT
Start: 2020-03-23 | End: 2020-05-12

## 2020-03-23 RX ORDER — LEVOTHYROXINE SODIUM 0.05 MG/1
50 TABLET ORAL DAILY
Qty: 30 TABLET | Refills: 5 | Status: SHIPPED | OUTPATIENT
Start: 2020-03-23 | End: 2020-05-26 | Stop reason: SDUPTHER

## 2020-03-23 NOTE — CARE COORDINATION
[facility] 45 Transitions Follow Up Call    3/23/2020    Patient: Fox Fernandes  Patient : 1970   MRN: [id number]  Reason for Admission: Elevated Lipase  Discharge Date: 3/13/20 RARS: Readmission Risk Score: 62         Spoke with: Fox Fernandes  Patient stated he is doing ok. He is still having abd pain. He has an appt with his surgeon tomorrow. He is hoping to have is gallbladder surgery moved up. Denies concerns or issues. He just wants his surgery soon. Care Transitions Subsequent and Final Call    Subsequent and Final Calls  Do you have any ongoing symptoms?:  No  Have your medications changed?:  No  Do you have any questions related to your medications?:  No  Do you currently have any active services?:  No  Do you have any needs or concerns that I can assist you with?:  No  Identified Barriers:  None  Care Transitions Interventions  No Identified Needs  Other Interventions:             Follow Up  Future Appointments   Date Time Provider Ebenezer Carmichael   3/24/2020 10:30 AM Kashmir Pascual MD Adv Surg [facility] - [facility] [place] [address]   3/30/2020  1:00 PM Mando Baldwin, PT [facility]   2020  2:30 PM Ana Rees [facility] - [place]   2020 11:30 AM STR ECHO RM3 STRZ ECHO [place] [facility]   2020 10:00 AM MARILYN Lagos - CNP [facility] Physic [facility] [place]   2020 10:45 AM MD [facility] Urology P - Cathy Davis, RN

## 2020-03-23 NOTE — PROGRESS NOTES
High Blood Pressure Mother     High Cholesterol Mother     Vision Loss Maternal Grandmother     Arthritis Maternal Grandfather     Heart Disease Maternal Grandfather      Outpatient Medications Marked as Taking for the 3/23/20 encounter (Office Visit) with Maximo Fountain MD   Medication Sig Dispense Refill    insulin aspart (NOVOLOG FLEXPEN) 100 UNIT/ML injection pen Inject 22 Units into the skin 3 times daily (before meals) 5 pen 3    dicyclomine (BENTYL) 10 MG capsule Take 1 capsule by mouth 4 times daily 360 capsule 1    metoclopramide (REGLAN) 10 MG tablet Take 1 tablet by mouth 4 times daily 120 tablet 0    polyethylene glycol (GLYCOLAX) packet Take 17 g by mouth daily as needed for Constipation 527 g 0    pantoprazole (PROTONIX) 40 MG tablet Take 1 tablet by mouth 2 times daily (before meals) 60 tablet 0    sucralfate (CARAFATE) 1 GM tablet Take 1 tablet by mouth 4 times daily (before meals and nightly) 120 tablet 0    amLODIPine (NORVASC) 2.5 MG tablet Take 1 tablet by mouth daily 30 tablet 3    sertraline (ZOLOFT) 50 MG tablet Take 1 tablet by mouth daily 30 tablet 5    Lancets MISC Use to test blood sugars 4 times daily DX:E11.65 200 each 0    insulin detemir (LEVEMIR FLEXTOUCH) 100 UNIT/ML injection pen Inject 80 Units into the skin 2 times daily 30 pen 5    pioglitazone (ACTOS) 30 MG tablet Take 1 tablet by mouth daily 30 tablet 2    gabapentin (NEURONTIN) 300 MG capsule TAKE 1 CAPSULE BY MOUTH 3 TIMES A DAY 90 capsule 1    AMINO ACIDS COMPLEX PO Take 1 each by mouth daily Daily AA drink      blood glucose monitor kit and supplies Accu Chek Sheila meter.  Use to test blood sugars DX:E11.65 1 kit 0    blood glucose monitor strips Accucheck Sheila Test Strips Test blood sugars 4 times daily DX:E11.65 400 strip 5    Insulin Syringe-Needle U-100 29G X 1/2\" 0.3 ML MISC 1 each by Does not apply route 4 times daily (after meals and at bedtime) 200 each 0       Pcn [penicillins] and Clindamycin/lincomycin  Social History     Tobacco Use   Smoking Status Former Smoker    Packs/day: 5.00    Years: 13.00    Pack years: 65.00    Types: Cigars    Last attempt to quit: 1985    Years since quittin.2   Smokeless Tobacco Never Used       Social History     Substance and Sexual Activity   Alcohol Use Not Currently    Alcohol/week: 0.0 standard drinks       REVIEW OF SYSTEMS:  Constitutional: negative  Eyes: negative  Respiratory: negative  Cardiovascular: negative  Gastrointestinal: negative  Musculoskeletal: negative  Genitourinary: negative  Skin: negative   Neurological: negative  Hematological/Lymphatic: negative  Psychological: negative    Physical Exam:    This a 44 y.o. male   Vitals:    20 0828   Temp: 97.6 °F (36.4 °C)     Constitutional: Patient in no acute distress   Neuro: alert and oriented to person place and time. Psych: Mood and affect normal.  Head: atraumatic normocephalic  Eyes: EOMi  HEENT: neck supple, trachea midline  Lungs: Respiratory effort normal  Cardiovascular:  Normal peripheral pulses  Abdomen: Soft, non-tender, non-distended, No CVA  Bladder: non-tender and not distended. FROMx4, no cyanosis clubbing edema  Skin: warm and dry      Assessment and Plan      1. Urinary retention    2. Enlarged prostate           Plan:      No follow-ups on file.   Timed double voiding  Flomax  Will need BPH work up

## 2020-03-24 ENCOUNTER — APPOINTMENT (OUTPATIENT)
Dept: PHYSICAL THERAPY | Age: 50
End: 2020-03-24
Payer: MEDICARE

## 2020-03-24 ENCOUNTER — NURSE ONLY (OUTPATIENT)
Dept: LAB | Age: 50
End: 2020-03-24

## 2020-03-24 ENCOUNTER — PREP FOR PROCEDURE (OUTPATIENT)
Dept: SURGERY | Age: 50
End: 2020-03-24

## 2020-03-24 ENCOUNTER — TELEPHONE (OUTPATIENT)
Dept: SURGERY | Age: 50
End: 2020-03-24

## 2020-03-24 ENCOUNTER — OFFICE VISIT (OUTPATIENT)
Dept: SURGERY | Age: 50
End: 2020-03-24
Payer: MEDICARE

## 2020-03-24 ENCOUNTER — ANESTHESIA EVENT (OUTPATIENT)
Dept: OPERATING ROOM | Age: 50
End: 2020-03-24
Payer: MEDICARE

## 2020-03-24 VITALS
OXYGEN SATURATION: 99 % | DIASTOLIC BLOOD PRESSURE: 84 MMHG | RESPIRATION RATE: 18 BRPM | BODY MASS INDEX: 38.09 KG/M2 | SYSTOLIC BLOOD PRESSURE: 132 MMHG | WEIGHT: 237 LBS | TEMPERATURE: 97.5 F | HEIGHT: 66 IN | HEART RATE: 103 BPM

## 2020-03-24 LAB — PROSTATE SPECIFIC ANTIGEN: 0.78 NG/ML (ref 0–1)

## 2020-03-24 PROCEDURE — 99204 OFFICE O/P NEW MOD 45 MIN: CPT | Performed by: SURGERY

## 2020-03-24 RX ORDER — CIPROFLOXACIN 2 MG/ML
400 INJECTION, SOLUTION INTRAVENOUS
Status: CANCELLED | OUTPATIENT
Start: 2020-03-24

## 2020-03-24 RX ORDER — SODIUM CHLORIDE 9 MG/ML
INJECTION, SOLUTION INTRAVENOUS CONTINUOUS
Status: CANCELLED | OUTPATIENT
Start: 2020-03-24

## 2020-03-24 RX ORDER — ONDANSETRON 4 MG/1
4 TABLET, FILM COATED ORAL EVERY 8 HOURS PRN
Qty: 30 TABLET | Refills: 0 | Status: ON HOLD | OUTPATIENT
Start: 2020-03-24 | End: 2020-07-20

## 2020-03-24 NOTE — TELEPHONE ENCOUNTER
Jase Tom is tentatively scheduled for robot lap lorrie with Dr. Rhonda Michel 4/1 if the OR deems surgery a medical necessity. Would you give medical clearance for surgery? Thanks!

## 2020-03-25 ENCOUNTER — TELEPHONE (OUTPATIENT)
Dept: SURGERY | Age: 50
End: 2020-03-25

## 2020-03-25 NOTE — TELEPHONE ENCOUNTER
Pt's surgery has been approved as urgent/emergent by OR review. Left voicemail for patient to call office back to relay instructions.

## 2020-03-26 ENCOUNTER — PATIENT MESSAGE (OUTPATIENT)
Dept: INTERNAL MEDICINE CLINIC | Age: 50
End: 2020-03-26

## 2020-03-26 ENCOUNTER — APPOINTMENT (OUTPATIENT)
Dept: PHYSICAL THERAPY | Age: 50
End: 2020-03-26
Payer: MEDICARE

## 2020-03-26 ASSESSMENT — ENCOUNTER SYMPTOMS
BLOOD IN STOOL: 0
BACK PAIN: 1
VOMITING: 1
ABDOMINAL PAIN: 1
CONSTIPATION: 0
CHEST TIGHTNESS: 0
DIARRHEA: 0
COUGH: 0
TROUBLE SWALLOWING: 0
NAUSEA: 1
SHORTNESS OF BREATH: 0
SORE THROAT: 0

## 2020-03-26 NOTE — PROGRESS NOTES
Jaime Jang MD  [facility] H&P    Pt Name: Justyna Quiroga  MRN: [id number]  YOB: 1970  Date of evaluation: 3/26/2020  Primary Care Physician: MARILYN Cortes - CNP  Patient evaluated at the request of  MARILYN Arizmendi  Reason for evaluation: abdominal pain   IMPRESSIONS:       ICD-10-CM    1. Gallstones K80.20 LAPAROSCOPY CHOLECYSTECTOMY   1.   2. does not have any pertinent problems on file. PLANS:   1. Had a long discussion with the patient today about surgical treatment options. He is very symptomatic from his gallbladder, he states he is not able to eat anything and does not feel that he can postpone surgery. We reviewed that with the current pandemic of Natalie 19 that all elective cases are trying to be delayed until May. The patient states that his symptoms are so severe that he is not able to eat having chronic pain and persistent nausea that he does not feel he will be able to stay the emergency department due to his symptoms. The risk benefits and alternatives to surgery were discussed with the patient including bleeding infection damage to surrounding structures need for an open operation as well as increased risk of code exposure in the hospital setting. He understands these risks and wants to proceed    SUBJECTIVE:     CC:abdominal pain   History of Chief Complaint:    Kelsea Ramírez is a 44 y.o.male presents with 2 months of right-sided abdominal pain that is sharp stabbing in nature and he rates it a 8 out of 10. He also has severe associated nausea and vomiting anytime he eats. He used to not able to eat greasy or fatty foods however he now has severe attacks with any oral intake. He says the nausea and vomiting and is associated is so severe that he is not able to eat anything. He feels like he has lost weight. Usually he states the symptoms were intermittent but now they are persistent.   He had gallbladder ultrasound that demonstrated large gallstones within his gallbladder. As well as a CT scan and a HIDA scan, HIDA scan demonstrated less than 5% ejection fraction. Past Medical History  Past Medical History:   Diagnosis Date    Bipolar 2 disorder [facility])     previously followed with Dr. Amari Domínguez and Arnav Pereira in [facility] Diabetes mellitus type 2, uncontrolled ([address]     HgbA1c on 4/2/2019 was 9.1.     Diabetic polyneuropathy ([address]     Diabetic ulcer of right foot associated with type 2 diabetes mellitus ([address] 12/10/2015    Essential hypertension     \"never been on b/p medication that I know of\"    GERD (gastroesophageal reflux disease)     Hammer toe of left foot     Heart murmur     denies any chest pain or palpitations    History of tobacco abuse     Hx of AKA (above knee amputation), right ([address] 04/18/2019    Dr. Nino Fiore edema, diabetic, bilateral ([address] 05/04/2018    Dr. 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 ([address] 6/29/2016       Past Surgical History  Past Surgical History:   Procedure Laterality Date    ABSCESS DRAINAGE Right     foot    AMPUTATION ABOVE KNEE Right 4/18/2019    RIGHT ABOVE KNEE AMPUTATION performed by Jerry Chand MD at [facility] Right 07/01/2016    I & D    INCISION AND DRAINAGE Right 2/18/2019    I&D RIGHT STUMP performed by Jerry Chand MD at [address] [facility] Right 07/20/2016    LEG AMPUTATION BELOW KNEE Right 4/4/2019    I&D AND REVISION OF AMPUTATION RIGHT LEG performed by Jerry Chand MD at [address] Right 1/14/15    sole of foot I&D    CA DRAIN INFECT SHOULDER BURSA Left 8/18/2017    LEFT SHOULDER INCISION AND DRAINAGE performed by Jerry Chand MD at [address] [facility] OFFICE/OUTPT VISIT,PROCEDURE ONLY Right 9/20/2018    EXCISIONAL DEBRIDEMENT RIGHT BKA STUMP performed by Rafael Hernandez MD at [facility] Right 1/16/15    2nd toe with wound vac applied    UPPER GASTROINTESTINAL ENDOSCOPY N/A 3/3/2020    EGD DILATION SAVORY performed by Felix Cheadle, MD at [address] [place] [address]  ? when       Medications  Current Outpatient Medications on File Prior to Visit   Medication Sig Dispense Refill    tamsulosin (FLOMAX) 0.4 MG capsule Take 1 capsule by mouth daily 30 capsule 11    insulin aspart (NOVOLOG FLEXPEN) 100 UNIT/ML injection pen Inject 22 Units into the skin 3 times daily (before meals) 5 pen 3    dicyclomine (BENTYL) 10 MG capsule Take 1 capsule by mouth 4 times daily 360 capsule 1    metoclopramide (REGLAN) 10 MG tablet Take 1 tablet by mouth 4 times daily 120 tablet 0    polyethylene glycol (GLYCOLAX) packet Take 17 g by mouth daily as needed for Constipation 527 g 0    pantoprazole (PROTONIX) 40 MG tablet Take 1 tablet by mouth 2 times daily (before meals) 60 tablet 0    sucralfate (CARAFATE) 1 GM tablet Take 1 tablet by mouth 4 times daily (before meals and nightly) 120 tablet 0    amLODIPine (NORVASC) 2.5 MG tablet Take 1 tablet by mouth daily 30 tablet 3    sertraline (ZOLOFT) 50 MG tablet Take 1 tablet by mouth daily 30 tablet 5    insulin detemir (LEVEMIR FLEXTOUCH) 100 UNIT/ML injection pen Inject 80 Units into the skin 2 times daily 30 pen 5    pioglitazone (ACTOS) 30 MG tablet Take 1 tablet by mouth daily 30 tablet 2    gabapentin (NEURONTIN) 300 MG capsule TAKE 1 CAPSULE BY MOUTH 3 TIMES A DAY 90 capsule 1    AMINO ACIDS COMPLEX PO Take 1 each by mouth daily Daily AA drink      levothyroxine (SYNTHROID) 50 MCG tablet Take 1 tablet by mouth daily (Patient not taking: Reported on 3/24/2020) 30 tablet 5    Lancets MISC Use to test blood sugars 4 times daily DX:E11.65 (Patient not taking: Reported on 3/24/2020) 200 each 0    blood glucose monitor kit and supplies Accu Chek Sheila meter. Use to test blood sugars DX:E11.65 (Patient not taking: Reported on 3/24/2020) 1 kit 0    blood glucose monitor strips Accucheck Sheila Test Strips Test blood sugars 4 times daily DX:E11.65 (Patient not taking: Reported on 3/24/2020) 400 strip 5    Insulin Syringe-Needle U-100 29G X 1/2\" 0.3 ML MISC 1 each by Does not apply route 4 times daily (after meals and at bedtime) (Patient not taking: Reported on 3/24/2020) 200 each 0     No current facility-administered medications on file prior to visit.         Allergies  Pcn [penicillins] and Clindamycin/lincomycin    Family History      Problem Relation Age of Onset   Cintia Ibarra Diabetes Mother     Other Mother         pneumonia, H1N1    Depression Mother     Early Death Mother     High Blood Pressure Mother     High Cholesterol Mother     Vision Loss Maternal Grandmother     Arthritis Maternal Grandfather     Heart Disease Maternal Grandfather         Social History  Social History     Socioeconomic History    Marital status:      Spouse name: Not on file    Number of children: 2    Years of education: 15    Highest education level: Not on file   Occupational History    Not on file   Social Needs    Financial resource strain: Not on file    Food insecurity     Worry: Not on file     Inability: Not on file   [employer] needs     Medical: Not on file     Non-medical: Not on file   Tobacco Use    Smoking status: Former Smoker     Packs/day: 5.00     Years: 13.00     Pack years: 65.00     Types: Cigars     Last attempt to quit:      Years since quittin.2    Smokeless tobacco: Never Used   Substance and Sexual Activity    Alcohol use: Not Currently     Alcohol/week: 0.0 standard drinks    Drug use: No    Sexual activity: Yes     Partners: Female   Lifestyle    Physical activity     Days per week: Not on file     Minutes per session: Not on file    Stress: Not on file Relationships    Social connections     Talks on phone: Not on file     Gets together: Not on file     Attends Voodoo service: Not on file     Active member of club or organization: Not on file     Attends meetings of clubs or organizations: Not on file     Relationship status: Not on file    Intimate partner violence     Fear of current or ex partner: Not on file     Emotionally abused: Not on file     Physically abused: Not on file     Forced sexual activity: Not on file   Other Topics Concern    Not on file   Social History Narrative    Not on file        Review of Systems:  Review of Systems   Constitutional: Negative for appetite change, fatigue and fever. HENT: Negative for ear pain, sore throat and trouble swallowing. Respiratory: Negative for cough, chest tightness and shortness of breath. Cardiovascular: Negative for chest pain, palpitations and leg swelling. Gastrointestinal: Positive for abdominal pain, nausea and vomiting. Negative for blood in stool, constipation and diarrhea. Endocrine: Negative for cold intolerance. Genitourinary: Negative for difficulty urinating and urgency. Musculoskeletal: Positive for back pain. Negative for joint swelling. Skin: Negative for rash and wound. Allergic/Immunologic: Negative for immunocompromised state. Neurological: Negative for seizures and headaches. Psychiatric/Behavioral: Negative for hallucinations and suicidal ideas. The patient is not nervous/anxious. OBJECTIVE:   CURRENT VITALS:  height is 5' 6\" (1.676 m) and weight is 237 lb (107.5 kg). His temporal temperature is 97.5 °F (36.4 °C). His blood pressure is 132/84 and his pulse is 103. His respiration is 18 and oxygen saturation is 99%. Body mass index is 38.25 kg/m². Physical Exam  Vitals signs and nursing note reviewed. Constitutional:       Appearance: Normal appearance. He is well-developed. Comments: In wheelchair.    HENT:      Head: Normocephalic and

## 2020-03-27 ENCOUNTER — TELEPHONE (OUTPATIENT)
Dept: INTERNAL MEDICINE CLINIC | Age: 50
End: 2020-03-27

## 2020-03-27 NOTE — TELEPHONE ENCOUNTER
From: Paula Hagen  To: MARILYN Seals CNP  Sent: 3/26/2020 1:17 PM EDT  Subject: Test Results Question    I have a question about XR CHEST PA AND LATERAL resulted on 3/17/20.  So what did you find out

## 2020-03-27 NOTE — PROGRESS NOTES
[address] [facility]  Therapy Contact Note      Date: 3/27/2020  Patient Name: Bertha Sewell        MRN: [id number]    Account Number: [de-identified]  : 1970  (44 y.o.)  Gender: male           Contacted patient to follow up with regarding his status in HEP. Voice message left regarding cancellation of his appointment on 3/30/2020 due to COVID-19 restrictions. Will hold on appointments at this time and call patient to schedule further appointment when restrictions have been lifted.      Lotus Gongora, [address]

## 2020-03-30 ENCOUNTER — CARE COORDINATION (OUTPATIENT)
Dept: CASE MANAGEMENT | Age: 50
End: 2020-03-30

## 2020-03-30 ENCOUNTER — HOSPITAL ENCOUNTER (OUTPATIENT)
Dept: PHYSICAL THERAPY | Age: 50
Setting detail: THERAPIES SERIES
Discharge: HOME | End: 2020-03-30
Payer: MEDICARE

## 2020-03-30 NOTE — CARE COORDINATION
[facility] Transitions Follow Up Call    3/30/2020    Patient: Antonia Monique  Patient : 1970   MRN: [id number]  Reason for Admission:Elevated Lipase  Discharge Date: 3/13/20 RARS: Readmission Risk Score: 57       Attempted transition call. Left message to return call.     Follow Up  Future Appointments   Date Time Provider Ebenezer Carmichael   2020  9:15 AM Cl Quiroga  [address] HOD   2020  2:30 PM [facility] [address]   2020 11:30 AM STR ECHO RM3 STRZ ECHO [place] HOD   2020 10:00 AM MARILYN Saba - CNP SRPX Physic [facility]   2020 10:45 AM MD KATE Johnson AM [address] Urology [facility] - Ghazala Barker RN

## 2020-03-31 ENCOUNTER — CARE COORDINATION (OUTPATIENT)
Dept: CASE MANAGEMENT | Age: 50
End: 2020-03-31

## 2020-04-01 ENCOUNTER — ANESTHESIA (OUTPATIENT)
Dept: OPERATING ROOM | Age: 50
End: 2020-04-01
Payer: MEDICARE

## 2020-04-01 ENCOUNTER — HOSPITAL ENCOUNTER (OUTPATIENT)
Age: 50
Setting detail: OUTPATIENT SURGERY
Discharge: HOME | End: 2020-04-01
Attending: SURGERY | Admitting: SURGERY
Payer: MEDICARE

## 2020-04-01 VITALS
OXYGEN SATURATION: 98 % | BODY MASS INDEX: 38.09 KG/M2 | RESPIRATION RATE: 18 BRPM | HEART RATE: 118 BPM | TEMPERATURE: 97.8 F | DIASTOLIC BLOOD PRESSURE: 79 MMHG | WEIGHT: 237 LBS | SYSTOLIC BLOOD PRESSURE: 138 MMHG | HEIGHT: 66 IN

## 2020-04-01 VITALS — SYSTOLIC BLOOD PRESSURE: 166 MMHG | TEMPERATURE: 98.8 F | DIASTOLIC BLOOD PRESSURE: 75 MMHG | OXYGEN SATURATION: 98 %

## 2020-04-01 LAB
GLUCOSE BLD-MCNC: 327 MG/DL (ref 70–108)
GLUCOSE BLD-MCNC: 348 MG/DL (ref 70–108)
GLUCOSE BLD-MCNC: 393 MG/DL (ref 70–108)
POTASSIUM SERPL-SCNC: 4.1 MEQ/L (ref 3.5–5.2)

## 2020-04-01 PROCEDURE — 6370000000 HC RX 637 (ALT 250 FOR IP)

## 2020-04-01 PROCEDURE — 47562 LAPAROSCOPIC CHOLECYSTECTOMY: CPT | Performed by: SURGERY

## 2020-04-01 PROCEDURE — 2500000003 HC RX 250 WO HCPCS: Performed by: SURGERY

## 2020-04-01 PROCEDURE — 3600000009 HC SURGERY ROBOT BASE: Performed by: SURGERY

## 2020-04-01 PROCEDURE — 7100000010 HC PHASE II RECOVERY - FIRST 15 MIN: Performed by: SURGERY

## 2020-04-01 PROCEDURE — 6360000002 HC RX W HCPCS

## 2020-04-01 PROCEDURE — 6370000000 HC RX 637 (ALT 250 FOR IP): Performed by: SURGERY

## 2020-04-01 PROCEDURE — 99220 PR INITIAL OBSERVATION CARE/DAY 70 MINUTES: CPT | Performed by: INTERNAL MEDICINE

## 2020-04-01 PROCEDURE — 88304 TISSUE EXAM BY PATHOLOGIST: CPT

## 2020-04-01 PROCEDURE — 2580000003 HC RX 258: Performed by: REGISTERED NURSE

## 2020-04-01 PROCEDURE — 82948 REAGENT STRIP/BLOOD GLUCOSE: CPT

## 2020-04-01 PROCEDURE — 6360000002 HC RX W HCPCS: Performed by: ANESTHESIOLOGY

## 2020-04-01 PROCEDURE — 2500000003 HC RX 250 WO HCPCS: Performed by: REGISTERED NURSE

## 2020-04-01 PROCEDURE — 7100000011 HC PHASE II RECOVERY - ADDTL 15 MIN: Performed by: SURGERY

## 2020-04-01 PROCEDURE — 6360000002 HC RX W HCPCS: Performed by: SURGERY

## 2020-04-01 PROCEDURE — 2580000003 HC RX 258: Performed by: SURGERY

## 2020-04-01 PROCEDURE — 3700000001 HC ADD 15 MINUTES (ANESTHESIA): Performed by: SURGERY

## 2020-04-01 PROCEDURE — 6360000002 HC RX W HCPCS: Performed by: REGISTERED NURSE

## 2020-04-01 PROCEDURE — 7100000000 HC PACU RECOVERY - FIRST 15 MIN: Performed by: SURGERY

## 2020-04-01 PROCEDURE — C1894 INTRO/SHEATH, NON-LASER: HCPCS | Performed by: SURGERY

## 2020-04-01 PROCEDURE — 3600000019 HC SURGERY ROBOT ADDTL 15MIN: Performed by: SURGERY

## 2020-04-01 PROCEDURE — 2580000003 HC RX 258

## 2020-04-01 PROCEDURE — 84132 ASSAY OF SERUM POTASSIUM: CPT

## 2020-04-01 PROCEDURE — 2709999900 HC NON-CHARGEABLE SUPPLY: Performed by: SURGERY

## 2020-04-01 PROCEDURE — 36415 COLL VENOUS BLD VENIPUNCTURE: CPT

## 2020-04-01 PROCEDURE — 3700000000 HC ANESTHESIA ATTENDED CARE: Performed by: SURGERY

## 2020-04-01 PROCEDURE — 2780000010 HC IMPLANT OTHER: Performed by: SURGERY

## 2020-04-01 PROCEDURE — S2900 ROBOTIC SURGICAL SYSTEM: HCPCS | Performed by: SURGERY

## 2020-04-01 PROCEDURE — 6370000000 HC RX 637 (ALT 250 FOR IP): Performed by: ANESTHESIOLOGY

## 2020-04-01 PROCEDURE — 7100000001 HC PACU RECOVERY - ADDTL 15 MIN: Performed by: SURGERY

## 2020-04-01 DEVICE — Z DUP USE 2641840 CLIP INT L POLYMER LOK LIG HEM O LOK: Type: IMPLANTABLE DEVICE | Status: FUNCTIONAL

## 2020-04-01 RX ORDER — NEOSTIGMINE METHYLSULFATE 5 MG/5 ML
SYRINGE (ML) INTRAVENOUS PRN
Status: DISCONTINUED | OUTPATIENT
Start: 2020-04-01 | End: 2020-04-01 | Stop reason: SDUPTHER

## 2020-04-01 RX ORDER — SODIUM CHLORIDE 9 MG/ML
INJECTION, SOLUTION INTRAVENOUS CONTINUOUS PRN
Status: DISCONTINUED | OUTPATIENT
Start: 2020-04-01 | End: 2020-04-01 | Stop reason: SDUPTHER

## 2020-04-01 RX ORDER — DEXTROSE MONOHYDRATE 50 MG/ML
100 INJECTION, SOLUTION INTRAVENOUS PRN
Status: DISCONTINUED | OUTPATIENT
Start: 2020-04-01 | End: 2020-04-01 | Stop reason: HOSPADM

## 2020-04-01 RX ORDER — PROPOFOL 10 MG/ML
INJECTION, EMULSION INTRAVENOUS PRN
Status: DISCONTINUED | OUTPATIENT
Start: 2020-04-01 | End: 2020-04-01 | Stop reason: SDUPTHER

## 2020-04-01 RX ORDER — ONDANSETRON 2 MG/ML
4 INJECTION INTRAMUSCULAR; INTRAVENOUS
Status: DISCONTINUED | OUTPATIENT
Start: 2020-04-01 | End: 2020-04-01 | Stop reason: HOSPADM

## 2020-04-01 RX ORDER — PROMETHAZINE HYDROCHLORIDE 25 MG/ML
25 INJECTION, SOLUTION INTRAMUSCULAR; INTRAVENOUS ONCE
Status: COMPLETED | OUTPATIENT
Start: 2020-04-01 | End: 2020-04-01

## 2020-04-01 RX ORDER — ROCURONIUM BROMIDE 10 MG/ML
INJECTION, SOLUTION INTRAVENOUS PRN
Status: DISCONTINUED | OUTPATIENT
Start: 2020-04-01 | End: 2020-04-01 | Stop reason: SDUPTHER

## 2020-04-01 RX ORDER — DEXTROSE MONOHYDRATE 25 G/50ML
12.5 INJECTION, SOLUTION INTRAVENOUS PRN
Status: DISCONTINUED | OUTPATIENT
Start: 2020-04-01 | End: 2020-04-01 | Stop reason: HOSPADM

## 2020-04-01 RX ORDER — SODIUM CHLORIDE 9 MG/ML
INJECTION, SOLUTION INTRAVENOUS CONTINUOUS
Status: DISCONTINUED | OUTPATIENT
Start: 2020-04-01 | End: 2020-04-01 | Stop reason: HOSPADM

## 2020-04-01 RX ORDER — FENTANYL CITRATE 50 UG/ML
INJECTION, SOLUTION INTRAMUSCULAR; INTRAVENOUS PRN
Status: DISCONTINUED | OUTPATIENT
Start: 2020-04-01 | End: 2020-04-01 | Stop reason: SDUPTHER

## 2020-04-01 RX ORDER — HYDRALAZINE HYDROCHLORIDE 20 MG/ML
5 INJECTION INTRAMUSCULAR; INTRAVENOUS EVERY 10 MIN PRN
Status: DISCONTINUED | OUTPATIENT
Start: 2020-04-01 | End: 2020-04-01 | Stop reason: HOSPADM

## 2020-04-01 RX ORDER — ONDANSETRON 2 MG/ML
INJECTION INTRAMUSCULAR; INTRAVENOUS PRN
Status: DISCONTINUED | OUTPATIENT
Start: 2020-04-01 | End: 2020-04-01 | Stop reason: SDUPTHER

## 2020-04-01 RX ORDER — FENTANYL CITRATE 50 UG/ML
50 INJECTION, SOLUTION INTRAMUSCULAR; INTRAVENOUS EVERY 5 MIN PRN
Status: DISCONTINUED | OUTPATIENT
Start: 2020-04-01 | End: 2020-04-01 | Stop reason: HOSPADM

## 2020-04-01 RX ORDER — BUPIVACAINE HYDROCHLORIDE 5 MG/ML
INJECTION, SOLUTION EPIDURAL; INTRACAUDAL PRN
Status: DISCONTINUED | OUTPATIENT
Start: 2020-04-01 | End: 2020-04-01 | Stop reason: ALTCHOICE

## 2020-04-01 RX ORDER — DIPHENHYDRAMINE HYDROCHLORIDE 50 MG/ML
12.5 INJECTION INTRAMUSCULAR; INTRAVENOUS
Status: DISCONTINUED | OUTPATIENT
Start: 2020-04-01 | End: 2020-04-01 | Stop reason: HOSPADM

## 2020-04-01 RX ORDER — GLYCOPYRROLATE 1 MG/5 ML
SYRINGE (ML) INTRAVENOUS PRN
Status: DISCONTINUED | OUTPATIENT
Start: 2020-04-01 | End: 2020-04-01 | Stop reason: SDUPTHER

## 2020-04-01 RX ORDER — PROMETHAZINE HYDROCHLORIDE 25 MG/ML
INJECTION, SOLUTION INTRAMUSCULAR; INTRAVENOUS
Status: COMPLETED
Start: 2020-04-01 | End: 2020-04-01

## 2020-04-01 RX ORDER — LIDOCAINE HYDROCHLORIDE 20 MG/ML
INJECTION, SOLUTION INTRAVENOUS PRN
Status: DISCONTINUED | OUTPATIENT
Start: 2020-04-01 | End: 2020-04-01 | Stop reason: SDUPTHER

## 2020-04-01 RX ORDER — MEPERIDINE HYDROCHLORIDE 25 MG/ML
12.5 INJECTION INTRAMUSCULAR; INTRAVENOUS; SUBCUTANEOUS EVERY 5 MIN PRN
Status: DISCONTINUED | OUTPATIENT
Start: 2020-04-01 | End: 2020-04-01 | Stop reason: HOSPADM

## 2020-04-01 RX ORDER — INSULIN GLARGINE 100 [IU]/ML
20 INJECTION, SOLUTION SUBCUTANEOUS ONCE
Status: DISCONTINUED | OUTPATIENT
Start: 2020-04-01 | End: 2020-04-01 | Stop reason: HOSPADM

## 2020-04-01 RX ORDER — NICOTINE POLACRILEX 4 MG
15 LOZENGE BUCCAL PRN
Status: DISCONTINUED | OUTPATIENT
Start: 2020-04-01 | End: 2020-04-01 | Stop reason: HOSPADM

## 2020-04-01 RX ORDER — INDOCYANINE GREEN AND WATER 25 MG
1.25 KIT INJECTION ONCE
Status: COMPLETED | OUTPATIENT
Start: 2020-04-01 | End: 2020-04-01

## 2020-04-01 RX ORDER — HYDROCODONE BITARTRATE AND ACETAMINOPHEN 5; 325 MG/1; MG/1
1 TABLET ORAL EVERY 4 HOURS PRN
Status: DISCONTINUED | OUTPATIENT
Start: 2020-04-01 | End: 2020-04-01 | Stop reason: HOSPADM

## 2020-04-01 RX ORDER — MORPHINE SULFATE 2 MG/ML
2 INJECTION, SOLUTION INTRAMUSCULAR; INTRAVENOUS EVERY 5 MIN PRN
Status: DISCONTINUED | OUTPATIENT
Start: 2020-04-01 | End: 2020-04-01 | Stop reason: HOSPADM

## 2020-04-01 RX ORDER — HYDROCODONE BITARTRATE AND ACETAMINOPHEN 5; 325 MG/1; MG/1
1 TABLET ORAL EVERY 4 HOURS PRN
Qty: 18 TABLET | Refills: 0 | Status: SHIPPED | OUTPATIENT
Start: 2020-04-01 | End: 2020-04-04

## 2020-04-01 RX ORDER — HYDROMORPHONE HCL 110MG/55ML
PATIENT CONTROLLED ANALGESIA SYRINGE INTRAVENOUS PRN
Status: DISCONTINUED | OUTPATIENT
Start: 2020-04-01 | End: 2020-04-01 | Stop reason: SDUPTHER

## 2020-04-01 RX ADMIN — Medication 0.8 MG: at 08:39

## 2020-04-01 RX ADMIN — LIDOCAINE HYDROCHLORIDE 50 MG: 20 INJECTION, SOLUTION INTRAVENOUS at 07:54

## 2020-04-01 RX ADMIN — HYDRALAZINE HYDROCHLORIDE 5 MG: 20 INJECTION, SOLUTION INTRAMUSCULAR; INTRAVENOUS at 09:15

## 2020-04-01 RX ADMIN — FENTANYL CITRATE 100 MCG: 50 INJECTION, SOLUTION INTRAMUSCULAR; INTRAVENOUS at 07:54

## 2020-04-01 RX ADMIN — ONDANSETRON HYDROCHLORIDE 4 MG: 4 INJECTION, SOLUTION INTRAMUSCULAR; INTRAVENOUS at 07:54

## 2020-04-01 RX ADMIN — PROMETHAZINE HYDROCHLORIDE 25 MG: 25 INJECTION, SOLUTION INTRAMUSCULAR; INTRAVENOUS at 09:00

## 2020-04-01 RX ADMIN — Medication 8 UNITS: at 07:09

## 2020-04-01 RX ADMIN — CEFOXITIN 2 G: 2 INJECTION, POWDER, FOR SOLUTION INTRAVENOUS at 07:52

## 2020-04-01 RX ADMIN — INDOCYANINE GREEN AND WATER 1.25 MG: KIT at 07:29

## 2020-04-01 RX ADMIN — ROCURONIUM BROMIDE 50 MG: 10 INJECTION INTRAVENOUS at 07:54

## 2020-04-01 RX ADMIN — Medication 4 MG: at 08:39

## 2020-04-01 RX ADMIN — PROPOFOL 150 MG: 10 INJECTION, EMULSION INTRAVENOUS at 07:54

## 2020-04-01 RX ADMIN — PROMETHAZINE HYDROCHLORIDE 25 MG: 25 INJECTION INTRAMUSCULAR; INTRAVENOUS at 09:00

## 2020-04-01 RX ADMIN — ROCURONIUM BROMIDE 10 MG: 10 INJECTION INTRAVENOUS at 08:21

## 2020-04-01 RX ADMIN — Medication 8 UNITS: at 09:14

## 2020-04-01 RX ADMIN — HYDROCODONE BITARTRATE AND ACETAMINOPHEN 1 TABLET: 5; 325 TABLET ORAL at 11:36

## 2020-04-01 RX ADMIN — SODIUM CHLORIDE: 9 INJECTION, SOLUTION INTRAVENOUS at 07:20

## 2020-04-01 RX ADMIN — SODIUM CHLORIDE: 9 INJECTION, SOLUTION INTRAVENOUS at 07:38

## 2020-04-01 RX ADMIN — HYDROMORPHONE HYDROCHLORIDE 1 MG: 2 INJECTION INTRAMUSCULAR; INTRAVENOUS; SUBCUTANEOUS at 08:09

## 2020-04-01 ASSESSMENT — PULMONARY FUNCTION TESTS
PIF_VALUE: 3
PIF_VALUE: 32
PIF_VALUE: 33
PIF_VALUE: 32
PIF_VALUE: 30
PIF_VALUE: 24
PIF_VALUE: 30
PIF_VALUE: 3
PIF_VALUE: 24
PIF_VALUE: 20
PIF_VALUE: 0
PIF_VALUE: 30
PIF_VALUE: 30
PIF_VALUE: 33
PIF_VALUE: 32
PIF_VALUE: 33
PIF_VALUE: 3
PIF_VALUE: 20
PIF_VALUE: 19
PIF_VALUE: 32
PIF_VALUE: 30
PIF_VALUE: 3
PIF_VALUE: 33
PIF_VALUE: 1
PIF_VALUE: 2
PIF_VALUE: 1
PIF_VALUE: 3
PIF_VALUE: 32
PIF_VALUE: 20
PIF_VALUE: 24
PIF_VALUE: 20
PIF_VALUE: 32
PIF_VALUE: 28
PIF_VALUE: 30
PIF_VALUE: 31
PIF_VALUE: 11
PIF_VALUE: 2
PIF_VALUE: 24
PIF_VALUE: 24
PIF_VALUE: 10
PIF_VALUE: 31
PIF_VALUE: 32
PIF_VALUE: 0
PIF_VALUE: 30
PIF_VALUE: 32
PIF_VALUE: 0
PIF_VALUE: 33
PIF_VALUE: 32
PIF_VALUE: 19
PIF_VALUE: 1
PIF_VALUE: 32
PIF_VALUE: 12
PIF_VALUE: 3
PIF_VALUE: 25
PIF_VALUE: 23
PIF_VALUE: 32
PIF_VALUE: 30
PIF_VALUE: 10
PIF_VALUE: 32
PIF_VALUE: 31
PIF_VALUE: 20
PIF_VALUE: 26
PIF_VALUE: 31
PIF_VALUE: 22
PIF_VALUE: 32
PIF_VALUE: 32
PIF_VALUE: 30
PIF_VALUE: 0
PIF_VALUE: 0

## 2020-04-01 ASSESSMENT — PAIN DESCRIPTION - ORIENTATION: ORIENTATION: RIGHT

## 2020-04-01 ASSESSMENT — PAIN SCALES - GENERAL
PAINLEVEL_OUTOF10: 9
PAINLEVEL_OUTOF10: 10

## 2020-04-01 ASSESSMENT — PAIN DESCRIPTION - LOCATION: LOCATION: SHOULDER

## 2020-04-01 ASSESSMENT — PAIN DESCRIPTION - DESCRIPTORS: DESCRIPTORS: ACHING

## 2020-04-01 ASSESSMENT — PAIN DESCRIPTION - FREQUENCY: FREQUENCY: CONTINUOUS

## 2020-04-01 ASSESSMENT — PAIN DESCRIPTION - PAIN TYPE: TYPE: SURGICAL PAIN

## 2020-04-01 NOTE — PROGRESS NOTES
852 Awake and oriented on arrival to PACU , O2 switched to NC 3L , HOB elevated pt c/o pain medicated with dilaudid per CRNA [place] Pt BP elevated   900 pt c/o nausea , medicated with Phenergan 25 mg IM   915 eyes closed resp easy , BP remains elevated medicated with Hydralazine 5 mg IV   930 pt wakes up on and off and

## 2020-04-01 NOTE — PROGRESS NOTES
Pt has met discharge criteria and states he is ready for discharge to home. IV removed, gauze and tape applied. Dressed in own clothes and personal belongings gathered. Discharge instructions (with opioid medication education information) given to pt and family; pt verbalized understanding of discharge instructions, prescriptions and follow up appointments. Pt transported to discharge lobby by [facility] staff in his personal wheelchair.

## 2020-04-01 NOTE — H&P
drink        levothyroxine (SYNTHROID) 50 MCG tablet Take 1 tablet by mouth daily (Patient not taking: Reported on 3/24/2020) 30 tablet 5    Lancets MISC Use to test blood sugars 4 times daily DX:E11.65 (Patient not taking: Reported on 3/24/2020) 200 each 0    blood glucose monitor kit and supplies Accu Chek Sheila meter.  Use to test blood sugars DX:E11.65 (Patient not taking: Reported on 3/24/2020) 1 kit 0    blood glucose monitor strips Accucheck Sheila Test Strips Test blood sugars 4 times daily DX:E11.65 (Patient not taking: Reported on 3/24/2020) 400 strip 5    Insulin Syringe-Needle U-100 29G X 1/2\" 0.3 ML MISC 1 each by Does not apply route 4 times daily (after meals and at bedtime) (Patient not taking: Reported on 3/24/2020) 200 each 0      No current facility-administered medications on file prior to visit.          Allergies  Pcn [penicillins] and Clindamycin/lincomycin     Family History  Family History             Problem Relation Age of Onset    Diabetes Mother      Other Mother           pneumonia, H1N1    Depression Mother      Early Death Mother      High Blood Pressure Mother      High Cholesterol Mother      Vision Loss Maternal Grandmother      Arthritis Maternal Grandfather      Heart Disease Maternal Grandfather              Social History  Social History               Socioeconomic History    Marital status:        Spouse name: Not on file    Number of children: 2    Years of education: 15    Highest education level: Not on file   Occupational History    Not on file   Social Needs    Financial resource strain: Not on file    Food insecurity       Worry: Not on file       Inability: Not on file    Transportation needs       Medical: Not on file       Non-medical: Not on file   Tobacco Use    Smoking status: Former Smoker       Packs/day: 5.00       Years: 13.00       Pack years: 65.00       Types: Cigars       Last attempt to quit: 1985       Years since quitting: is 97.5 °F (36.4 °C). His blood pressure is 132/84 and his pulse is 103. His respiration is 18 and oxygen saturation is 99%. Body mass index is 38.25 kg/m². Physical Exam  Vitals signs and nursing note reviewed. Constitutional:       Appearance: Normal appearance. He is well-developed. Comments: In wheelchair. HENT:      Head: Normocephalic and atraumatic. Nose: Nose normal.   Eyes:      General: No scleral icterus. Pupils: Pupils are equal, round, and reactive to light. Neck:      Musculoskeletal: Normal range of motion. Thyroid: No thyromegaly. Cardiovascular:      Rate and Rhythm: Normal rate and regular rhythm. Pulmonary:      Effort: Pulmonary effort is normal.   Abdominal:      Palpations: Abdomen is soft. There is no mass. Tenderness: There is abdominal tenderness Jacobo Patience to palpation in right upper quadrant. ). There is no guarding or rebound. Hernia: No hernia is present. Musculoskeletal: Normal range of motion. Comments: Right lower extremity with above-the-knee amputation   Skin:     General: Skin is warm and dry. Findings: No erythema. Neurological:      Mental Status: He is alert and oriented to person, place, and time.          LABS:   No results for input(s): WBC, HGB, HCT, PLT, NA, K, CL, CO2, BUN, CREATININE, MG, PHOS, CALCIUM, PTT, INR, AST, ALT, BILITOT, BILIDIR, AMYLASE, LIPASE, LDH, LACTA, NITRU, COLORU, BACTERIA in the last 72 hours.     Invalid input(s): PT, WBCU, RBCU, LEUKOCYTESUA  RADIOLOGY:   I have personally reviewed the following films:  1. Coarse, increased echogenicity of the liver, correlate with fatty or other parenchymal changes. 2. Small gravel-like stones not excluded within the gallbladder, the detail is limited without luminal distention.             HIDA scan with 5% ejection fraction      Thank you for the interesting evaluation.  Further recommendations to follow.     Electronically signed by Marco A Ferris MD on

## 2020-04-01 NOTE — FLOWSHEET NOTE
Dr Chapito Herrera in [facility] to see pt. Pt ok to discharge to home. Pt should monitor blood sugars hourly, if over 400 can return to ED.

## 2020-04-01 NOTE — CONSULTS
Hospitalist - History & Physical      Patient: Vladimir Vizcaino    Unit/Bed:[facility] OR (General) POOL R*  YOB: 1970  MRN: [id number]   Acct: [de-identified]   PCP: MARILYN Felix - CNP    Date of Service: Pt seen/examined on 04/01/20  Reason for consult-high blood sugars    Chief Complaint: Abdominal pain    Assessment and Plan:-  1. Uncontrolled blood sugars-history of uncontrolled type 2 diabetes and peripheral neuropathy on insulin and Actos - likely secondary to not taking his insulin today being n.p.o. and being postsurgical-we will give 20 units of Lantus now he usually takes 80 units twice daily at home-also received 8 units of regular insulin in the OR-patient can be discharged home with close monitoring of blood sugar every hour while he is at home and recovering and once he starts to eat. Patient does have all insulin and glucose monitoring supplies at home-he was well educated on how to monitor his blood sugars at home-asked him to come back to the ER if the blood sugars remains above 400-should not if the patient takes his insulin once he starts eating. 2. Status post cholecystectomy laparoscopic 4/1-General surgery managing  3. Benign essential hypertension-uncontrolled currently likely secondary to pain on Norvasc  4. History of GERD on Protonix since Carafate-continue  5. History of hypothyroidism-on Synthyroid-continue    Thank you for the consult    History Of Present Illness:    Seen Patient in same day  Post Surgery area requested by Dr Katie Sanderson for high Blood sugars - mid 300s post surgery  Pt has been NPO, is a type II diabetic on Actos 30 mg daily, on NovoLog 22 units 3 times a day and Lantus 80 units 2 times daily.   Patient did not take any of his insulin today  Patient had a laparoscopic cholecystectomy by Dr Katie Sanderson today-patient complained of nausea vomiting and right-sided abdominal pain not able to keep anything oral and gallbladder ultrasound showed large gallstones and HIDA scan showed low ejection fraction-he underwent cholecystectomy after that on 4/1/2020  Seen patient postoperatively  Patient is still groggy from the anesthesia and pain medications  Complaining of surgical related abdominal pain. Spoke briefly about his blood sugar control at home and he sees usually it runs around in the high 100s and low 200s at home. Last hemoglobin A1c on 3/3/2020 is 8.3    Past Medical History:        Diagnosis Date    Bipolar 2 disorder [facility])     previously followed with Dr. Jesse Giron and Alonso Oneal in [facility] Diabetes mellitus type 2, uncontrolled ([address]     HgbA1c on 4/2/2019 was 9.1.     Diabetic polyneuropathy ([address]     Diabetic ulcer of right foot associated with type 2 diabetes mellitus ([address] 12/10/2015    Essential hypertension     \"never been on b/p medication that I know of\"    GERD (gastroesophageal reflux disease)     Hammer toe of left foot     Heart murmur     denies any chest pain or palpitations    History of tobacco abuse     Hx of AKA (above knee amputation), right ([address] 04/18/2019    Dr. Denny Ocampo edema, diabetic, bilateral ([address] 05/04/2018    Dr. Michell Naranjo referred to retina specialist for 2nd opinion    Marijuana abuse in remission     Onychomycosis     Other disorders of kidney and ureter in diseases classified elsewhere     Sleep apnea     no cpap    WD-Skin ulcer of fourth toe of right foot with necrosis of bone ([address] 6/29/2016       Past Surgical History:        Procedure Laterality Date    ABSCESS DRAINAGE Right     foot    AMPUTATION ABOVE KNEE Right 4/18/2019    RIGHT ABOVE KNEE AMPUTATION performed by Bee Walter MD at [address] Right 07/01/2016    I & D    INCISION AND DRAINAGE Right 2/18/2019    I&D RIGHT STUMP performed by Bee Walter MD at [address] [facility] Right 07/20/2016    LEG AMPUTATION BELOW KNEE Right 4/4/2019    I&D AND REVISION OF AMPUTATION RIGHT LEG performed by Alfredo Espitia MD at [facility] [address] Right 1/14/15    sole of foot I&D    RI DRAIN INFECT SHOULDER BURSA Left 8/18/2017    LEFT SHOULDER INCISION AND DRAINAGE performed by Alfredo Espitia MD at [address] [facility] OFFICE/OUTPT [address] Right 9/20/2018    EXCISIONAL DEBRIDEMENT RIGHT BKA STUMP performed by Genet Edwards MD at [address] Right 1/16/15    2nd toe with wound vac applied    UPPER GASTROINTESTINAL ENDOSCOPY N/A 3/3/2020    EGD DILATION SAVORY performed by Roxene Lombard, MD at [address]  ? when       Home Medications:   No current facility-administered medications on file prior to encounter.       Current Outpatient Medications on File Prior to Encounter   Medication Sig Dispense Refill    levothyroxine (SYNTHROID) 50 MCG tablet Take 1 tablet by mouth daily 30 tablet 5    tamsulosin (FLOMAX) 0.4 MG capsule Take 1 capsule by mouth daily 30 capsule 11    insulin aspart (NOVOLOG FLEXPEN) 100 UNIT/ML injection pen Inject 22 Units into the skin 3 times daily (before meals) 5 pen 3    dicyclomine (BENTYL) 10 MG capsule Take 1 capsule by mouth 4 times daily 360 capsule 1    metoclopramide (REGLAN) 10 MG tablet Take 1 tablet by mouth 4 times daily 120 tablet 0    pantoprazole (PROTONIX) 40 MG tablet Take 1 tablet by mouth 2 times daily (before meals) 60 tablet 0    sucralfate (CARAFATE) 1 GM tablet Take 1 tablet by mouth 4 times daily (before meals and nightly) 120 tablet 0    amLODIPine (NORVASC) 2.5 MG tablet Take 1 tablet by mouth daily 30 tablet 3    sertraline (ZOLOFT) 50 MG tablet Take 1 tablet by mouth daily 30 tablet 5    pioglitazone (ACTOS) 30 MG tablet Take 1 tablet by mouth daily 30 tablet 2    gabapentin (NEURONTIN) 300 MG capsule TAKE 1 CAPSULE BY MOUTH 3 TIMES A DAY 90 capsule 1    AMINO ACIDS COMPLEX PO Take 1 each by mouth daily Daily AA drink      polyethylene glycol (GLYCOLAX) packet Take 17 g by mouth daily as needed for Constipation 527 g 0    Lancets MISC Use to test blood sugars 4 times daily DX:E11.65 (Patient not taking: Reported on 3/24/2020) 200 each 0    insulin detemir (LEVEMIR FLEXTOUCH) 100 UNIT/ML injection pen Inject 80 Units into the skin 2 times daily 30 pen 5    blood glucose monitor kit and supplies Accu Chek Sheila meter. Use to test blood sugars DX:E11.65 (Patient not taking: Reported on 3/24/2020) 1 kit 0    blood glucose monitor strips Accucheck Sheila Test Strips Test blood sugars 4 times daily DX:E11.65 (Patient not taking: Reported on 3/24/2020) 400 strip 5    Insulin Syringe-Needle U-100 29G X 1/2\" 0.3 ML MISC 1 each by Does not apply route 4 times daily (after meals and at bedtime) (Patient not taking: Reported on 3/24/2020) 200 each 0       Allergies:    Pcn [penicillins] and Clindamycin/lincomycin    Social History:    reports that he quit smoking about 35 years ago. His smoking use included cigars. He has a 65.00 pack-year smoking history. He has never used smokeless tobacco. He reports previous alcohol use. He reports that he does not use drugs. Family History:       Problem Relation Age of Onset    Diabetes Mother     Other Mother         pneumonia, H1N1    Depression Mother     Early Death Mother     High Blood Pressure Mother     High Cholesterol Mother     Vision Loss Maternal Grandmother     Arthritis Maternal Grandfather     Heart Disease Maternal Grandfather        Diet:  No diet orders on file    Review of systems:   Pertinent positives as noted in the HPI. All other systems reviewed and negative. PHYSICAL EXAM:  BP (!) 153/72   Pulse 115   Temp 97.8 °F (36.6 °C) (Temporal)   Resp 18   Ht 5' 6\" (1.676 m)   Wt 237 lb (107.5 kg)   SpO2 98%   BMI 38.25 kg/m²   General appearance: In moderate distress secondary to pain, groggy  HEENT: Normal cephalic, atraumatic without obvious deformity. Pupils equal, round, and reactive to light.   Extra ocular muscles intact. Conjunctivae/corneas clear. Neck: Supple, with full range of motion. No jugular venous distention. Trachea midline. Respiratory:  Normal respiratory effort. Clear to auscultation, bilaterally without Rales/Wheezes/Rhonchi. Cardiovascular: Regular rate and rhythm with normal S1/S2 without murmurs, rubs or gallops. Abdomen: Soft, surgical related tenderness- +distended with faint bowel sounds. Musculoskeletal:  No clubbing, cyanosis or edema bilaterally. Right above-knee amputation  Skin: Skin color, texture, turgor normal.  No rashes or lesions. Neurologic:  Neurovascularly intact without any focal sensory/motor deficits. Cranial nerves: II-XII intact, grossly non-focal.  Psychiatric: Awake when woken up still recovering from anesthesia  Capillary Refill: Brisk,< 3 seconds   Peripheral Pulses: +2 palpable, equal bilaterally     Labs:   No results for input(s): WBC, HGB, HCT, PLT in the last 72 hours. Recent Labs     04/01/20  0620   K 4.1     No results for input(s): AST, ALT, BILIDIR, BILITOT, ALKPHOS in the last 72 hours. No results for input(s): INR in the last 72 hours. No results for input(s): [employer] in the last 72 hours. Urinalysis:    Lab Results   Component Value Date    NITRU NEGATIVE 03/08/2020    WBCUA 0-2 03/02/2020    BACTERIA NONE SEEN 03/02/2020    RBCUA 0-2 03/02/2020    BLOODU NEGATIVE 03/08/2020    SPECGRAV >1.030 06/06/2019    GLUCOSEU >= 1000 03/08/2020       Radiology:   No orders to display     No results found.         Electronically signed by Leverette Essex, MD on 4/1/2020 at 1:04 PM

## 2020-04-01 NOTE — ANESTHESIA POSTPROCEDURE EVALUATION
Department of Anesthesiology  Postprocedure Note    Patient: Fermín Quezada  MRN: [id number]  YOB: 1970  Date of evaluation: 4/1/2020  Time:  10:48 AM     Procedure Summary     Date:  04/01/20 Room / Location:  [facility]    Anesthesia Start:  9649 Anesthesia Stop:  0901    Procedure:  ROBOTIC CHOLECYSTECTOMY (N/A Abdomen) Diagnosis:  (GALLSTONES)    Surgeon:  Shelva Hashimoto, MD Responsible Provider:  Joselin Browne MD    Anesthesia Type:  general ASA Status:  4          Anesthesia Type: general    Everette Phase I: Everette Score: 10    Everette Phase II: Everette Score: 10    Last vitals: Reviewed and per EMR flowsheets. Anesthesia Post Evaluation   [facility]  POST-ANESTHESIA NOTE       Name:  Fermín Quezada                                         Age:  44 y.o.   MRN:  [id number]      Last Vitals:  BP (!) 171/79   Pulse 115   Temp 98.6 °F (37 °C) (Temporal)   Resp 18   Ht 5' 6\" (1.676 m)   Wt 237 lb (107.5 kg)   SpO2 94%   BMI 38.25 kg/m²   Patient Vitals for the past 4 hrs:   BP Temp Temp src Pulse Resp SpO2   04/01/20 1043 (!) 171/79 -- -- 115 18 94 %   04/01/20 1026 131/84 -- -- 118 18 95 %   04/01/20 0958 (!) 174/86 98.6 °F (37 °C) Temporal 112 18 99 %   04/01/20 0950 (!) 176/80 -- -- 109 26 94 %   04/01/20 0945 (!) 180/81 -- -- 109 18 92 %   04/01/20 0940 (!) 174/80 -- -- 109 22 93 %   04/01/20 0935 (!) 183/83 -- -- 107 21 97 %   04/01/20 0930 (!) 179/80 -- -- 104 18 97 %   04/01/20 0925 (!) 186/80 -- -- 103 24 96 %   04/01/20 0920 (!) 196/92 -- -- 103 15 98 %   04/01/20 0915 (!) 189/87 -- -- 101 22 96 %   04/01/20 0910 (!) 178/84 -- -- 101 18 98 %   04/01/20 0905 (!) 182/86 -- -- 100 20 98 %   04/01/20 0900 (!) 188/90 -- -- 100 16 99 %   04/01/20 0855 (!) 219/99 -- -- 103 21 96 %   04/01/20 0852 (!) 200/98 97.7 °F (36.5 °C) Temporal 100 18 97 %       Level of Consciousness:  Awake    Respiratory:  Stable    Oxygen Saturation:  Stable    Cardiovascular:

## 2020-04-01 NOTE — ANESTHESIA PRE PROCEDURE
drink   Yes Historical Provider, MD   polyethylene glycol (GLYCOLAX) packet Take 17 g by mouth daily as needed for Constipation 3/3/20 4/3/20  Douglas Blanc MD   Lancets MISC Use to test blood sugars 4 times daily DX:E11.65  Patient not taking: Reported on 3/24/2020 2/19/20   MARILYN Luna CNP   insulin detemir (LEVEMIR FLEXTOUCH) 100 UNIT/ML injection pen Inject 80 Units into the skin 2 times daily 2/19/20   MARILYN Luna CNP   blood glucose monitor kit and supplies Accu Chek Sheila meter. Use to test blood sugars DX:E11.65  Patient not taking: Reported on 3/24/2020 10/8/19   MARILYN Luna CNP   blood glucose monitor strips Accucheck Sheila Test Strips Test blood sugars 4 times daily DX:E11.65  Patient not taking: Reported on 3/24/2020 10/8/19   MARILYN Luna CNP   Insulin Syringe-Needle U-100 29G X 1/2\" 0.3 ML MISC 1 each by Does not apply route 4 times daily (after meals and at bedtime)  Patient not taking: Reported on 3/24/2020 4/30/19   Ronen Sarmiento MD       Current medications:    Current Facility-Administered Medications   Medication Dose Route Frequency Provider Last Rate Last Dose    cefOXitin (MEFOXIN) 2 g in dextrose 5% 50 mL (mini-bag)  2 g Intravenous 30 Keira Ricardo MD        0.9 % sodium chloride infusion   Intravenous Continuous Nadine Mooney LPN 20 mL/hr at 80/15/42 0720      indocyanine green (IC-GREEN) syringe 1.25 mg  1.25 mg Intravenous Once Randolph Benitez MD           Allergies: Allergies   Allergen Reactions    Pcn [Penicillins] Shortness Of Breath, Nausea And Vomiting and Other (See Comments)     Does not remember reactions. Has TOLERATED amoxicillin and several different cephalosporins.      Clindamycin/Lincomycin Itching       Problem List:    Patient Active Problem List   Diagnosis Code    Cellulitis L03.90    Status post below knee amputation of right lower extremity (HCC) Z89.511    Gait disturbance R26.9    Sebaceous cyst L72.3    Uncontrolled type 2 diabetes mellitus with hyperglycemia, with long-term current use of insulin ([facility]) E11.65, Z79.4    Severe bipolar II disorder, recent episode major depressive, remission ([facility]) F31.81    Bipolar 1 disorder ([facility]) F31.9    Leukocytosis D72.829    Lactic acidosis E87.2    Hyponatremia E87.1    Normocytic anemia D64.9    Obesity (BMI 30-39. 9) E66.9    History of noncompliance with medical treatment Z91.19    Essential hypertension I10    Tobacco dependence F17.200    Gastroesophageal reflux disease without esophagitis K21.9    History of marijuana use Z87.898    Physical debility R53.81    Anemia D64.9    Electrolyte imbalance E87.8    Weakness R53.1    Infection of above knee amputation stump of right leg ([facility]) T87.43    Above knee amputation of right lower extremity ([facility]) B79.737N    Sepsis ([facility]) A41.9    Vitamin D deficiency E55.9    COPD exacerbation ([facility]) J44.1    Influenza B J10.1    Depression, recurrent ([facility]) F33.9    Major depressive disorder, recurrent ([facility]) F33.9    GI bleed K92.2    Elevated lipase R74.8    Pressure ulcer of left calf, unstageable ([facility]) L89.890    Other psychoactive substance abuse, uncomplicated ([facility]) V22.48    Calculus of gallbladder without cholecystitis without obstruction K80.20    Right upper quadrant abdominal pain R10.11       Past Medical History:        Diagnosis Date    Bipolar 2 disorder ([facility])     previously followed with Dr. Tamiko Bullard and Selena Morales in [facility] Diabetes mellitus type 2, uncontrolled ([facility]     HgbA1c on 4/2/2019 was 9.1.     Diabetic polyneuropathy ([facility]     Diabetic ulcer of right foot associated with type 2 diabetes mellitus ([facility] 12/10/2015    Essential hypertension     \"never been on b/p medication that I know of\"    GERD (gastroesophageal reflux disease)     Hammer toe of left foot     Heart murmur     denies any chest pain or palpitations    History of tobacco

## 2020-04-01 NOTE — OP NOTE
Operative Note    [facility]  RECORD OF OPERATION  PATIENT NAME: Rhiannon Ambrose [facility] RECORD NO. [id number]  SURGEON: Bj Rooney MD  Primary Care Physician: MARILYN Billingsley CNP     PROCEDURE PERFORMED:  4/1/2020  PREOPERATIVE DIAGNOSIS: GALLSTONES  POSTOPERATIVE DIAGNOSIS: Same, path pending  PROCEDURE PERFORMED:  Robotic Assisted Laparoscopic cholecystectomy. SURGEON:  Dr. Bj Rooney. ANESTHESIA:  General with local.       DISCUSSION: Simran Varner is a 44y.o. year old male who was seen in evaluation at request of MARILYN Billingsley CNP regarding signs and symptoms, gallbladder disease, radiographic findings of gallstones and biliary dyskinesia. Simran Varner was seen and evaluated, operative and non operative management was discussed. Laparoscopic and open approaches reviewed. He was given opportunity to ask questions. Once answered informed consent was obtained. OPERATIVE FINDINGS:    - gallstones   - critical view of safety was achieved demonstrating two and only two distinct structures entering the abdomen and the critical view of safety was achieved. PROCEDURE:  Simran Varner was seen the preoperative holding room where informed consent was reviewed and all questions answered. The patient was brought to the operating room and placed on the operating table in supine position. After induction of adequate anesthesia a formal timeout was performed and the patient was prepped and draped in the normal sterile fashion. Local anesthetic was instilled at [place] point and a skin incision was made, the Veress needle was inserted, there was good return of air and good fluid drop. Pneumoperitoneum was then achieved to 15 mm of Mercury pressure. Just to the left of the umbilicus a skin incision was made, a 12 mm Optiview trocar was inserted, and initial laparoscopy was unremarkable.  Three additional 8mm trocars were placed under direct visualization spacing them a handbreadth apart one

## 2020-04-02 ENCOUNTER — TELEPHONE (OUTPATIENT)
Dept: SURGERY | Age: 50
End: 2020-04-02

## 2020-04-03 ENCOUNTER — APPOINTMENT (OUTPATIENT)
Dept: GENERAL RADIOLOGY | Age: 50
End: 2020-04-03
Payer: MEDICARE

## 2020-04-03 ENCOUNTER — TELEPHONE (OUTPATIENT)
Dept: INTERNAL MEDICINE CLINIC | Age: 50
End: 2020-04-03

## 2020-04-03 ENCOUNTER — HOSPITAL ENCOUNTER (EMERGENCY)
Age: 50
Discharge: HOME | End: 2020-04-03
Attending: FAMILY MEDICINE
Payer: MEDICARE

## 2020-04-03 ENCOUNTER — APPOINTMENT (OUTPATIENT)
Dept: CT IMAGING | Age: 50
End: 2020-04-03
Payer: MEDICARE

## 2020-04-03 ENCOUNTER — PATIENT MESSAGE (OUTPATIENT)
Dept: SURGERY | Age: 50
End: 2020-04-03

## 2020-04-03 VITALS
OXYGEN SATURATION: 98 % | HEART RATE: 94 BPM | RESPIRATION RATE: 18 BRPM | SYSTOLIC BLOOD PRESSURE: 147 MMHG | DIASTOLIC BLOOD PRESSURE: 79 MMHG | HEIGHT: 66 IN | BODY MASS INDEX: 38.09 KG/M2 | TEMPERATURE: 97.6 F | WEIGHT: 237 LBS

## 2020-04-03 LAB
ANION GAP SERPL CALCULATED.3IONS-SCNC: 14 MEQ/L (ref 8–16)
BASOPHILS # BLD: 0.6 %
BASOPHILS ABSOLUTE: 0.1 THOU/MM3 (ref 0–0.1)
BILIRUBIN URINE: NEGATIVE
BLOOD, URINE: NEGATIVE
BUN BLDV-MCNC: 11 MG/DL (ref 7–22)
CALCIUM SERPL-MCNC: 9 MG/DL (ref 8.5–10.5)
CHARACTER, URINE: CLEAR
CHLORIDE BLD-SCNC: 93 MEQ/L (ref 98–111)
CO2: 24 MEQ/L (ref 23–33)
COLOR: YELLOW
CREAT SERPL-MCNC: 0.8 MG/DL (ref 0.4–1.2)
D-DIMER QUANTITATIVE: 811 NG/ML FEU (ref 0–500)
EKG ATRIAL RATE: 105 BPM
EKG P AXIS: 38 DEGREES
EKG P-R INTERVAL: 158 MS
EKG Q-T INTERVAL: 336 MS
EKG QRS DURATION: 80 MS
EKG QTC CALCULATION (BAZETT): 444 MS
EKG R AXIS: 24 DEGREES
EKG T AXIS: 58 DEGREES
EKG VENTRICULAR RATE: 105 BPM
EOSINOPHIL # BLD: 4 %
EOSINOPHILS ABSOLUTE: 0.4 THOU/MM3 (ref 0–0.4)
ERYTHROCYTE [DISTWIDTH] IN BLOOD BY AUTOMATED COUNT: 14 % (ref 11.5–14.5)
ERYTHROCYTE [DISTWIDTH] IN BLOOD BY AUTOMATED COUNT: 43.7 FL (ref 35–45)
FLU A ANTIGEN: NEGATIVE
FLU B ANTIGEN: NEGATIVE
GFR SERPL CREATININE-BSD FRML MDRD: > 90 ML/MIN/1.73M2
GLUCOSE BLD-MCNC: 365 MG/DL (ref 70–108)
GLUCOSE URINE: >= 1000 MG/DL
HCT VFR BLD CALC: 40.8 % (ref 42–52)
HEMOGLOBIN: 13.9 GM/DL (ref 14–18)
IMMATURE GRANS (ABS): 0.07 THOU/MM3 (ref 0–0.07)
IMMATURE GRANULOCYTES: 0.7 %
KETONES, URINE: ABNORMAL
LACTIC ACID: 2.6 MMOL/L (ref 0.5–2.2)
LEUKOCYTE ESTERASE, URINE: NEGATIVE
LYMPHOCYTES # BLD: 21.7 %
LYMPHOCYTES ABSOLUTE: 2.2 THOU/MM3 (ref 1–4.8)
MCH RBC QN AUTO: 29.4 PG (ref 26–33)
MCHC RBC AUTO-ENTMCNC: 34.1 GM/DL (ref 32.2–35.5)
MCV RBC AUTO: 86.3 FL (ref 80–94)
MONOCYTES # BLD: 7.8 %
MONOCYTES ABSOLUTE: 0.8 THOU/MM3 (ref 0.4–1.3)
NITRITE, URINE: NEGATIVE
NUCLEATED RED BLOOD CELLS: 0 /100 WBC
OSMOLALITY CALCULATION: 276.9 MOSMOL/KG (ref 275–300)
PH UA: 5.5 (ref 5–9)
PLATELET # BLD: 197 THOU/MM3 (ref 130–400)
PMV BLD AUTO: 9.5 FL (ref 9.4–12.4)
POTASSIUM SERPL-SCNC: 4 MEQ/L (ref 3.5–5.2)
PROTEIN UA: NEGATIVE
RBC # BLD: 4.73 MILL/MM3 (ref 4.7–6.1)
SEG NEUTROPHILS: 65.2 %
SEGMENTED NEUTROPHILS ABSOLUTE COUNT: 6.5 THOU/MM3 (ref 1.8–7.7)
SODIUM BLD-SCNC: 131 MEQ/L (ref 135–145)
SPECIFIC GRAVITY, URINE: > 1.03 (ref 1–1.03)
TROPONIN T: < 0.01 NG/ML
UROBILINOGEN, URINE: 1 EU/DL (ref 0–1)
WBC # BLD: 10 THOU/MM3 (ref 4.8–10.8)

## 2020-04-03 PROCEDURE — 87040 BLOOD CULTURE FOR BACTERIA: CPT

## 2020-04-03 PROCEDURE — 80048 BASIC METABOLIC PNL TOTAL CA: CPT

## 2020-04-03 PROCEDURE — 93005 ELECTROCARDIOGRAM TRACING: CPT | Performed by: FAMILY MEDICINE

## 2020-04-03 PROCEDURE — 99283 EMERGENCY DEPT VISIT LOW MDM: CPT

## 2020-04-03 PROCEDURE — 36415 COLL VENOUS BLD VENIPUNCTURE: CPT

## 2020-04-03 PROCEDURE — 81003 URINALYSIS AUTO W/O SCOPE: CPT

## 2020-04-03 PROCEDURE — 87804 INFLUENZA ASSAY W/OPTIC: CPT

## 2020-04-03 PROCEDURE — 71046 X-RAY EXAM CHEST 2 VIEWS: CPT

## 2020-04-03 PROCEDURE — 6360000004 HC RX CONTRAST MEDICATION: Performed by: FAMILY MEDICINE

## 2020-04-03 PROCEDURE — 85379 FIBRIN DEGRADATION QUANT: CPT

## 2020-04-03 PROCEDURE — 83605 ASSAY OF LACTIC ACID: CPT

## 2020-04-03 PROCEDURE — 71045 X-RAY EXAM CHEST 1 VIEW: CPT

## 2020-04-03 PROCEDURE — 85025 COMPLETE CBC W/AUTO DIFF WBC: CPT

## 2020-04-03 PROCEDURE — 71275 CT ANGIOGRAPHY CHEST: CPT

## 2020-04-03 PROCEDURE — 84484 ASSAY OF TROPONIN QUANT: CPT

## 2020-04-03 RX ORDER — ALBUTEROL SULFATE 90 UG/1
2 AEROSOL, METERED RESPIRATORY (INHALATION) 4 TIMES DAILY PRN
Qty: 1 INHALER | Refills: 0 | Status: SHIPPED | OUTPATIENT
Start: 2020-04-03 | End: 2020-05-12

## 2020-04-03 RX ORDER — METHYLPREDNISOLONE 4 MG/1
TABLET ORAL
Qty: 1 KIT | Refills: 0 | Status: SHIPPED | OUTPATIENT
Start: 2020-04-03 | End: 2020-04-09

## 2020-04-03 RX ORDER — BENZONATATE 200 MG/1
200 CAPSULE ORAL 3 TIMES DAILY PRN
Qty: 30 CAPSULE | Refills: 0 | Status: SHIPPED | OUTPATIENT
Start: 2020-04-03 | End: 2020-04-13

## 2020-04-03 RX ADMIN — IOPAMIDOL 80 ML: 755 INJECTION, SOLUTION INTRAVENOUS at 18:29

## 2020-04-03 ASSESSMENT — PAIN DESCRIPTION - LOCATION: LOCATION: ABDOMEN

## 2020-04-03 ASSESSMENT — ENCOUNTER SYMPTOMS
APNEA: 0
COUGH: 1
SORE THROAT: 0
DIARRHEA: 0
BACK PAIN: 0
SINUS PRESSURE: 0
VOMITING: 0
WHEEZING: 0
SHORTNESS OF BREATH: 1
CONSTIPATION: 0
CHOKING: 0
STRIDOR: 0
CHEST TIGHTNESS: 0
ABDOMINAL PAIN: 0
NAUSEA: 0
SINUS PAIN: 0

## 2020-04-03 ASSESSMENT — PAIN DESCRIPTION - PAIN TYPE: TYPE: SURGICAL PAIN

## 2020-04-03 ASSESSMENT — PAIN DESCRIPTION - FREQUENCY: FREQUENCY: CONTINUOUS

## 2020-04-03 ASSESSMENT — PAIN SCALES - GENERAL: PAINLEVEL_OUTOF10: 9

## 2020-04-03 NOTE — TELEPHONE ENCOUNTER
From: Fermín Quezada  To:  Shelva Hashimoto, MD  Sent: 4/3/2020 9:57 AM EDT  Subject: Non-Urgent Medical Question    I'm having cold chills and I run a fever at night and I have trouble breathing

## 2020-04-03 NOTE — TELEPHONE ENCOUNTER
Left a message on [employer] with correct phone number and will also send him a message on [employer]

## 2020-04-03 NOTE — ED PROVIDER NOTES
[facility]  eMERGENCY dEPARTMENT eNCOUnter          [address] [facility]       Chief Complaint   Patient presents with    Shortness of Breath    Cough    Fever       Nurses Notes reviewed and I agree except as noted in the HPI. HISTORY OF PRESENT ILLNESS    Mita Casas is a 44 y.o. male who presents to the Emergency Department for the evaluation of fever shortness of breath and cough. No sick contacts or recent travel. Patient did undergo a cholecystectomy without complications yesterday. Reports some incisional tenderness but denies any nausea vomiting diarrhea back or abdominal pain. He reports a productive cough associated with congestion. Reports subjective fevers and chills but has not checked with a thermometer. No history of PE or DVT in the past.  Denies any lower leg swelling weight gain orthopnea or paroxysmal nocturnal dyspnea. Denies chest pain and reports intermittent dyspnea with exertion. Presents for further evaluation and management. The HPI was provided by the patient. REVIEW OF SYSTEMS     Review of Systems   Constitutional: Positive for appetite change and fever. Negative for activity change, chills and fatigue. HENT: Positive for congestion. Negative for sinus pressure, sinus pain, sneezing and sore throat. Eyes: Negative for visual disturbance. Respiratory: Positive for cough and shortness of breath. Negative for apnea, choking, chest tightness, wheezing and stridor. Cardiovascular: Negative for chest pain, palpitations and leg swelling. Gastrointestinal: Negative for abdominal pain, constipation, diarrhea, nausea and vomiting. Endocrine: Negative for polyuria. Genitourinary: Negative for decreased urine volume, difficulty urinating, dysuria, frequency, hematuria and urgency. Musculoskeletal: Negative for arthralgias, back pain and myalgias. Skin: Negative for rash. Allergic/Immunologic: Negative for immunocompromised state.

## 2020-04-04 ENCOUNTER — CARE COORDINATION (OUTPATIENT)
Dept: CARE COORDINATION | Age: 50
End: 2020-04-04

## 2020-04-04 PROCEDURE — 93010 ELECTROCARDIOGRAM REPORT: CPT | Performed by: NUCLEAR MEDICINE

## 2020-04-04 NOTE — CARE COORDINATION
Left VM message asking patient to call me back at [phone] for ED follow up/COVID outreach call. He returned call. COVID-19 Screening Initial Follow-up Note    Patient contacted regarding Leslie Alexandre. Care Transition Nurse/ Ambulatory Care Manager contacted the patient by telephone to perform post discharge assessment. Verified name and  with patient as identifiers. Provided introduction to self, and explanation of the CTN/ACM role, and reason for call due to risk factors for infection and/or exposure to COVID-19. Symptoms reviewed with patient who verbalized the following symptoms: cough, shortness of breath and no new/worsening symptoms       Due to no new or worsening symptoms encounter was not routed to provider for escalation. Patient has following risk factors of: COPD. CTN/ACM reviewed discharge instructions, medical action plan and red flags such as increased shortness of breath, increasing fever and signs of decompensation with patient who verbalized understanding. Discussed exposure protocols and quarantine with CDC Guidelines What to do if you are sick with coronavirus disease  Patient was given an opportunity for questions and concerns. The patient agrees to contact the [employer] exposure line [phone], [facility] department [place] [facility]: ([phone]) and PCP office for questions related to their healthcare. CTN/ACM provided contact information for future reference. Reviewed and educated patient on any new and changed medications related to discharge diagnosis     Plan for follow-up call in 3-5 days based on severity of symptoms and risk factors    ED visit 4/3 for viral illness with cough, shortness of breath and fever. Had recent robotic cholelap . Prescribed Tessalon Perles, albuterol inhaler and medrol dose pack. He is taking his medications. Symptoms about the same today but doesn't feel like he has a fever.  Oral fluids encouraged,

## 2020-04-06 ENCOUNTER — TELEPHONE (OUTPATIENT)
Dept: SURGERY | Age: 50
End: 2020-04-06

## 2020-04-07 ENCOUNTER — TELEPHONE (OUTPATIENT)
Dept: WOUND CARE | Age: 50
End: 2020-04-07

## 2020-04-07 ENCOUNTER — OFFICE VISIT (OUTPATIENT)
Dept: SURGERY | Age: 50
End: 2020-04-07

## 2020-04-07 VITALS
WEIGHT: 237 LBS | BODY MASS INDEX: 38.09 KG/M2 | SYSTOLIC BLOOD PRESSURE: 122 MMHG | TEMPERATURE: 97.2 F | HEIGHT: 66 IN | OXYGEN SATURATION: 98 % | RESPIRATION RATE: 18 BRPM | DIASTOLIC BLOOD PRESSURE: 82 MMHG | HEART RATE: 106 BPM

## 2020-04-07 PROCEDURE — 99024 POSTOP FOLLOW-UP VISIT: CPT | Performed by: SURGERY

## 2020-04-07 NOTE — TELEPHONE ENCOUNTER
Called patient regarding appt reminder for wound clinic appt tomorrow and to do covid19 screening. No answer. Left voicemail.

## 2020-04-09 ENCOUNTER — CARE COORDINATION (OUTPATIENT)
Dept: CASE MANAGEMENT | Age: 50
End: 2020-04-09

## 2020-04-09 LAB
BLOOD CULTURE, ROUTINE: NORMAL
BLOOD CULTURE, ROUTINE: NORMAL

## 2020-04-10 ENCOUNTER — CARE COORDINATION (OUTPATIENT)
Dept: CASE MANAGEMENT | Age: 50
End: 2020-04-10

## 2020-04-10 NOTE — CARE COORDINATION
[facility] Transitions Follow Up Call    4/10/2020    Patient: Nithin Purdy  Patient : 1970   MRN: [id number]  Reason for Admission: Elevated Lipase  3/12/20 D/C home the OBS on 20 for:  PROCEDURE PERFORMED:  Robotic Assisted Laparoscopic cholecystectomy as OP on 20    Discharge Date: 4/3/20 RARS: Readmission Risk Score: 62    First attempt to reach for the sub transition call. Left message to call transition care nurse from [facility] @ [phone].     Follow Up  Future Appointments   Date Time Provider Ebenezer Carmichael   [facility]  2:30 [facility]JAX[facility]IRA   2020 11:30 AM STR ECHO RM3 STRZ ECHO Lozano HOD   2020 10:00 AM [place] Gip, APRN - CNP [facility] - [place]   2020 10:45 AM MD KATE Reis AM II.VIERT [facility] - [place] Moncada RN  Care Transition Nurse  [phone]

## 2020-04-13 ENCOUNTER — CARE COORDINATION (OUTPATIENT)
Dept: CASE MANAGEMENT | Age: 50
End: 2020-04-13

## 2020-04-14 ENCOUNTER — CARE COORDINATION (OUTPATIENT)
Dept: CASE MANAGEMENT | Age: 50
End: 2020-04-14

## 2020-04-14 NOTE — CARE COORDINATION
Unable to reach pt for care transitions/COVID  4/9, 4/10 & 4/13.     Will make the 2 wk COVID resolve on 4/17/20

## 2020-04-14 NOTE — PROGRESS NOTES
MD Paul Tran 238 Post op Note    Pt Name: Gus Jenkins Record Number: [id number]  Date of Birth 1970   Today's Date: 4/14/2020    ASSESSMENT       ICD-10-CM    1. Cholecystitis K81.9    2. Postop check Z09         PLAN   1. Overall doing well, no signs of infection just a suture granuloma  2. Can see me back as needed  Lee Parker is doing well, he says his pain with eating has improved. He noticed 1 of his incisions had a small pimple type drainage and was concerned there could be infection.   CURRENT MEDICATIONS     Current Outpatient Medications on File Prior to Visit   Medication Sig Dispense Refill    albuterol sulfate HFA (VENTOLIN HFA) 108 (90 Base) MCG/ACT inhaler Inhale 2 puffs into the lungs 4 times daily as needed for Wheezing or Shortness of Breath 1 Inhaler 0    ondansetron (ZOFRAN) 4 MG tablet Take 1 tablet by mouth every 8 hours as needed for Nausea or Vomiting 30 tablet 0    levothyroxine (SYNTHROID) 50 MCG tablet Take 1 tablet by mouth daily 30 tablet 5    tamsulosin (FLOMAX) 0.4 MG capsule Take 1 capsule by mouth daily 30 capsule 11    insulin aspart (NOVOLOG FLEXPEN) 100 UNIT/ML injection pen Inject 22 Units into the skin 3 times daily (before meals) 5 pen 3    dicyclomine (BENTYL) 10 MG capsule Take 1 capsule by mouth 4 times daily 360 capsule 1    metoclopramide (REGLAN) 10 MG tablet Take 1 tablet by mouth 4 times daily 120 tablet 0    pantoprazole (PROTONIX) 40 MG tablet Take 1 tablet by mouth 2 times daily (before meals) 60 tablet 0    sucralfate (CARAFATE) 1 GM tablet Take 1 tablet by mouth 4 times daily (before meals and nightly) 120 tablet 0    amLODIPine (NORVASC) 2.5 MG tablet Take 1 tablet by mouth daily 30 tablet 3    sertraline (ZOLOFT) 50 MG tablet Take 1 tablet by mouth daily 30 tablet 5    Lancets MISC Use to test blood sugars 4 times daily DX:E11.65 200 each 0    insulin detemir (LEVEMIR FLEXTOUCH) 100 UNIT/ML injection pen Inject 80 Units into the skin 2 times daily 30 pen 5    pioglitazone (ACTOS) 30 MG tablet Take 1 tablet by mouth daily 30 tablet 2    gabapentin (NEURONTIN) 300 MG capsule TAKE 1 CAPSULE BY MOUTH 3 TIMES A DAY 90 capsule 1    AMINO ACIDS COMPLEX PO Take 1 each by mouth daily Daily AA drink      blood glucose monitor kit and supplies Accu Chek Sheila meter. Use to test blood sugars DX:E11.65 1 kit 0    blood glucose monitor strips Accucheck Sheila Test Strips Test blood sugars 4 times daily DX:E11.65 400 strip 5    Insulin Syringe-Needle U-100 29G X 1/2\" 0.3 ML MISC 1 each by Does not apply route 4 times daily (after meals and at bedtime) 200 each 0     No current facility-administered medications on file prior to visit. OBJECTIVE   CURRENT VITALS:  height is 5' 6\" (1.676 m) and weight is 237 lb (107.5 kg). His temporal temperature is 97.2 °F (36.2 °C). His blood pressure is 122/82 and his pulse is 106. His respiration is 18 and oxygen saturation is 98%. Physical Exam  Constitutional:       Appearance: Normal appearance. Pulmonary:      Effort: Pulmonary effort is normal.   Abdominal:      Palpations: Abdomen is soft. Comments: Incisions are clean dry and intact, the lateral incision did have a small little suture granuloma that is likely what he thought was infected. No erythema or signs of active infection   Neurological:      Mental Status: He is alert.           LABS and Pathology   Pathology consistent with gallstones  RADIOLOGY   na    Electronically signed by Randolph Benitez MD on 4/14/2020 at 12:13 PM

## 2020-04-17 ENCOUNTER — CARE COORDINATION (OUTPATIENT)
Dept: CASE MANAGEMENT | Age: 50
End: 2020-04-17

## 2020-04-17 NOTE — CARE COORDINATION
Called pt for COVID resolve day # 14 & final transition call. Unable to reach pt for care transitions/COVID , 4/10 & .     [facility] Transitions FINAL Call     4/10/2020     Patient: Mita Casas          Patient : 1970   MRN: [id number]       Reason for Admission: Elevated Lipase  3/12/20 D/C home the OBS on 20 for:  PROCEDURE PERFORMED:  Robotic Assisted Laparoscopic cholecystectomy as OP on 20    Cheli Schofield RN  Care Transition Nurse  [phone]

## 2020-04-18 ENCOUNTER — CARE COORDINATION (OUTPATIENT)
Dept: CARE COORDINATION | Age: 50
End: 2020-04-18

## 2020-04-20 ENCOUNTER — TELEPHONE (OUTPATIENT)
Dept: INTERNAL MEDICINE CLINIC | Age: 50
End: 2020-04-20

## 2020-04-20 RX ORDER — PIOGLITAZONEHYDROCHLORIDE 45 MG/1
45 TABLET ORAL DAILY
Qty: 30 TABLET | Refills: 5 | Status: ON HOLD
Start: 2020-04-20 | End: 2020-05-14 | Stop reason: HOSPADM

## 2020-04-20 RX ORDER — INSULIN DETEMIR 100 [IU]/ML
85 INJECTION, SOLUTION SUBCUTANEOUS 2 TIMES DAILY
Qty: 30 PEN | Refills: 5 | Status: SHIPPED
Start: 2020-04-20 | End: 2020-04-23 | Stop reason: SDUPTHER

## 2020-04-20 RX ORDER — INSULIN ASPART 100 [IU]/ML
25 INJECTION, SOLUTION INTRAVENOUS; SUBCUTANEOUS
Qty: 5 PEN | Refills: 3 | Status: SHIPPED
Start: 2020-04-20 | End: 2020-04-23 | Stop reason: SDUPTHER

## 2020-04-23 RX ORDER — INSULIN DETEMIR 100 [IU]/ML
85 INJECTION, SOLUTION SUBCUTANEOUS 2 TIMES DAILY
Qty: 30 PEN | Refills: 5 | Status: SHIPPED | OUTPATIENT
Start: 2020-04-23 | End: 2020-09-17 | Stop reason: ALTCHOICE

## 2020-04-23 RX ORDER — INSULIN ASPART 100 [IU]/ML
25 INJECTION, SOLUTION INTRAVENOUS; SUBCUTANEOUS
Qty: 5 PEN | Refills: 3 | Status: SHIPPED | OUTPATIENT
Start: 2020-04-23 | End: 2020-06-18 | Stop reason: SDUPTHER

## 2020-04-27 RX ORDER — GABAPENTIN 300 MG/1
CAPSULE ORAL
Qty: 90 CAPSULE | Refills: 1 | OUTPATIENT
Start: 2020-04-27 | End: 2020-10-24

## 2020-04-27 RX ORDER — CEPHALEXIN 500 MG/1
500 CAPSULE ORAL 4 TIMES DAILY
Qty: 40 CAPSULE | Refills: 0 | Status: SHIPPED | OUTPATIENT
Start: 2020-04-27 | End: 2020-05-07

## 2020-04-27 RX ORDER — GABAPENTIN 300 MG/1
CAPSULE ORAL
Qty: 90 CAPSULE | Refills: 1 | Status: SHIPPED | OUTPATIENT
Start: 2020-04-27 | End: 2020-05-26 | Stop reason: SDUPTHER

## 2020-04-29 ENCOUNTER — APPOINTMENT (OUTPATIENT)
Dept: GENERAL RADIOLOGY | Age: 50
End: 2020-04-29
Payer: MEDICARE

## 2020-04-29 ENCOUNTER — HOSPITAL ENCOUNTER (EMERGENCY)
Age: 50
Discharge: HOME | End: 2020-04-29
Payer: MEDICARE

## 2020-04-29 VITALS
HEART RATE: 103 BPM | WEIGHT: 235 LBS | OXYGEN SATURATION: 97 % | DIASTOLIC BLOOD PRESSURE: 86 MMHG | BODY MASS INDEX: 37.93 KG/M2 | RESPIRATION RATE: 18 BRPM | SYSTOLIC BLOOD PRESSURE: 133 MMHG | TEMPERATURE: 98.2 F

## 2020-04-29 LAB
ANION GAP SERPL CALCULATED.3IONS-SCNC: 13 MEQ/L (ref 8–16)
BASOPHILS # BLD: 0.7 %
BASOPHILS ABSOLUTE: 0.1 THOU/MM3 (ref 0–0.1)
BUN BLDV-MCNC: 14 MG/DL (ref 7–22)
C-REACTIVE PROTEIN: 0.95 MG/DL (ref 0–1)
CALCIUM SERPL-MCNC: 9.3 MG/DL (ref 8.5–10.5)
CHLORIDE BLD-SCNC: 92 MEQ/L (ref 98–111)
CO2: 24 MEQ/L (ref 23–33)
CREAT SERPL-MCNC: 0.9 MG/DL (ref 0.4–1.2)
EOSINOPHIL # BLD: 3.2 %
EOSINOPHILS ABSOLUTE: 0.3 THOU/MM3 (ref 0–0.4)
ERYTHROCYTE [DISTWIDTH] IN BLOOD BY AUTOMATED COUNT: 14.2 % (ref 11.5–14.5)
ERYTHROCYTE [DISTWIDTH] IN BLOOD BY AUTOMATED COUNT: 45.2 FL (ref 35–45)
GFR SERPL CREATININE-BSD FRML MDRD: 89 ML/MIN/1.73M2
GLUCOSE BLD-MCNC: 518 MG/DL (ref 70–108)
HCT VFR BLD CALC: 44.3 % (ref 42–52)
HEMOGLOBIN: 14.8 GM/DL (ref 14–18)
IMMATURE GRANS (ABS): 0.06 THOU/MM3 (ref 0–0.07)
IMMATURE GRANULOCYTES: 0.6 %
LYMPHOCYTES # BLD: 25.4 %
LYMPHOCYTES ABSOLUTE: 2.4 THOU/MM3 (ref 1–4.8)
MCH RBC QN AUTO: 29.2 PG (ref 26–33)
MCHC RBC AUTO-ENTMCNC: 33.4 GM/DL (ref 32.2–35.5)
MCV RBC AUTO: 87.5 FL (ref 80–94)
MONOCYTES # BLD: 7.7 %
MONOCYTES ABSOLUTE: 0.7 THOU/MM3 (ref 0.4–1.3)
NUCLEATED RED BLOOD CELLS: 0 /100 WBC
OSMOLALITY CALCULATION: 282.7 MOSMOL/KG (ref 275–300)
PLATELET # BLD: 221 THOU/MM3 (ref 130–400)
PMV BLD AUTO: 9.6 FL (ref 9.4–12.4)
POTASSIUM REFLEX MAGNESIUM: 4.6 MEQ/L (ref 3.5–5.2)
RBC # BLD: 5.06 MILL/MM3 (ref 4.7–6.1)
SEDIMENTATION RATE, ERYTHROCYTE: 25 MM/HR (ref 0–10)
SEG NEUTROPHILS: 62.4 %
SEGMENTED NEUTROPHILS ABSOLUTE COUNT: 5.9 THOU/MM3 (ref 1.8–7.7)
SODIUM BLD-SCNC: 129 MEQ/L (ref 135–145)
WBC # BLD: 9.4 THOU/MM3 (ref 4.8–10.8)

## 2020-04-29 PROCEDURE — 87075 CULTR BACTERIA EXCEPT BLOOD: CPT

## 2020-04-29 PROCEDURE — 87147 CULTURE TYPE IMMUNOLOGIC: CPT

## 2020-04-29 PROCEDURE — 87077 CULTURE AEROBIC IDENTIFY: CPT

## 2020-04-29 PROCEDURE — 85025 COMPLETE CBC W/AUTO DIFF WBC: CPT

## 2020-04-29 PROCEDURE — 80048 BASIC METABOLIC PNL TOTAL CA: CPT

## 2020-04-29 PROCEDURE — 36415 COLL VENOUS BLD VENIPUNCTURE: CPT

## 2020-04-29 PROCEDURE — 99284 EMERGENCY DEPT VISIT MOD MDM: CPT

## 2020-04-29 PROCEDURE — 85651 RBC SED RATE NONAUTOMATED: CPT

## 2020-04-29 PROCEDURE — 87186 SC STD MICRODIL/AGAR DIL: CPT

## 2020-04-29 PROCEDURE — 87070 CULTURE OTHR SPECIMN AEROBIC: CPT

## 2020-04-29 PROCEDURE — 6370000000 HC RX 637 (ALT 250 FOR IP): Performed by: PHYSICIAN ASSISTANT

## 2020-04-29 PROCEDURE — 86140 C-REACTIVE PROTEIN: CPT

## 2020-04-29 PROCEDURE — 87205 SMEAR GRAM STAIN: CPT

## 2020-04-29 PROCEDURE — 73140 X-RAY EXAM OF FINGER(S): CPT

## 2020-04-29 RX ORDER — CEPHALEXIN 250 MG/1
500 CAPSULE ORAL ONCE
Status: COMPLETED | OUTPATIENT
Start: 2020-04-29 | End: 2020-04-29

## 2020-04-29 RX ORDER — ACETAMINOPHEN 325 MG/1
650 TABLET ORAL ONCE
Status: COMPLETED | OUTPATIENT
Start: 2020-04-29 | End: 2020-04-29

## 2020-04-29 RX ADMIN — ACETAMINOPHEN 650 MG: 325 TABLET ORAL at 14:57

## 2020-04-29 RX ADMIN — CEPHALEXIN 500 MG: 250 CAPSULE ORAL at 14:57

## 2020-04-29 ASSESSMENT — ENCOUNTER SYMPTOMS
COUGH: 0
SHORTNESS OF BREATH: 0
EYES NEGATIVE: 1
ABDOMINAL PAIN: 0
CONSTIPATION: 0
CHEST TIGHTNESS: 0
BACK PAIN: 0
DIARRHEA: 0
NAUSEA: 0

## 2020-04-29 ASSESSMENT — PAIN DESCRIPTION - FREQUENCY: FREQUENCY: CONTINUOUS

## 2020-04-29 ASSESSMENT — PAIN SCALES - GENERAL: PAINLEVEL_OUTOF10: 8

## 2020-04-29 ASSESSMENT — PAIN DESCRIPTION - DESCRIPTORS: DESCRIPTORS: ACHING;THROBBING

## 2020-04-29 ASSESSMENT — PAIN DESCRIPTION - LOCATION: LOCATION: FINGER (COMMENT WHICH ONE)

## 2020-04-29 ASSESSMENT — PAIN DESCRIPTION - ORIENTATION: ORIENTATION: LEFT

## 2020-04-29 ASSESSMENT — PAIN DESCRIPTION - PAIN TYPE: TYPE: ACUTE PAIN

## 2020-04-29 NOTE — ED PROVIDER NOTES
[address] [facility] [address] EMERGENCY DEPT    EMERGENCY MEDICINE     Pt Name: Bertha Sewell  MRN: [id number]  [place] 1970  Date of evaluation: 4/29/2020  Provider: Polo Lei Dr       Chief Complaint   Patient presents with    Finger Injury     left middle       HISTORY OF PRESENT ILLNESS    Bertha Sewell is a 44 y.o. male who presents to the emergency department from home with a chief complaint of left middle finger swelling for the past 4-5 days. Patient states he noticed some swelling about the top of his left middle finger above his PIP initially and contacted his PCP Qi Dimas CNP and sent a photo of his finger, and out of concern for possible infection of the PIP joint patient was started on Keflex 500 mg twice daily for 10 days, however patient states that he was unable to fill the prescription due to transportation issues at the time. Patient has come to the ED today out of concern related to the pain in his left finger as well as concern for infection of the joint. Pt reports mild symptom severity without movement of finer, and moderate severity with bending finger. Patient has not taken anything for the pain. Patient wants Tylenol for pain. Patient states that he has longstanding neuropathy and denies any change in sensation on his left hand or middle finger. Patient denies any fevers, chills, diaphoresis, hand pain, edema, or pain out of proportion, or any other symptoms at this time. Triage notes and Nursing notes were reviewed by myself. Any discrepancies are addressed above. PAST MEDICAL HISTORY     Past Medical History:   Diagnosis Date    Bipolar 2 disorder [facility])     previously followed with Dr. Yuri Contreras and Junito Bradshaw in [facility] Diabetes mellitus type 2, uncontrolled ([address]     HgbA1c on 4/2/2019 was 9.1.     Diabetic polyneuropathy ([address]     Diabetic ulcer of right foot associated with type 2 diabetes mellitus ([address] 12/10/2015    Essential hypertension     \"never been on b/p medication that I know of\"    GERD (gastroesophageal reflux disease)     Hammer toe of left foot     Heart murmur     denies any chest pain or palpitations    History of tobacco abuse     Hx of AKA (above knee amputation), right ([address] 04/18/2019    Dr. Kathie Flanagan edema, diabetic, bilateral ([address] 05/04/2018    Dr. Daryle Ganong referred to retina specialist for 2nd opinion    Marijuana abuse in remission     Onychomycosis     Other disorders of kidney and ureter in diseases classified elsewhere     Sleep apnea     no cpap    WD-Skin ulcer of fourth toe of right foot with necrosis of bone ([address] 6/29/2016       SURGICAL HISTORY       Past Surgical History:   Procedure Laterality Date    ABSCESS DRAINAGE Right     foot    AMPUTATION ABOVE KNEE Right 4/18/2019    RIGHT ABOVE KNEE AMPUTATION performed by Lady Manny MD at [address] [place] [address], LAPAROSCOPIC N/A 4/1/2020    ROBOTIC CHOLECYSTECTOMY performed by Tj Griffiths MD at [facility] Right 07/01/2016    I & D    INCISION AND DRAINAGE Right 2/18/2019    I&D RIGHT STUMP performed by Lady Manny MD at [place] [address] Right 07/20/2016    LEG AMPUTATION BELOW KNEE Right 4/4/2019    I&D AND REVISION OF AMPUTATION RIGHT LEG performed by Lady Manny MD at [address] [facility] Right 1/14/15    sole of foot I&D    [place] DRAIN INFECT SHOULDER BURSA Left 8/18/2017    LEFT SHOULDER INCISION AND DRAINAGE performed by Lady Manny MD at [address] OFFICE/OUTPT [address] Right 9/20/2018    EXCISIONAL DEBRIDEMENT RIGHT BKA STUMP performed by Man Dumont MD at [address] Right 1/16/15    2nd toe with wound vac applied    UPPER GASTROINTESTINAL ENDOSCOPY N/A 3/3/2020    EGD DILATION SAVORY performed by Nusrat Cortez MD at [facility]  ? when       CURRENT MEDICATIONS Previous Medications    ALBUTEROL SULFATE HFA (VENTOLIN HFA) 108 (90 BASE) MCG/ACT INHALER    Inhale 2 puffs into the lungs 4 times daily as needed for Wheezing or Shortness of Breath    AMINO ACIDS COMPLEX PO    Take 1 each by mouth daily Daily AA drink    AMLODIPINE (NORVASC) 2.5 MG TABLET    Take 1 tablet by mouth daily    BLOOD GLUCOSE MONITOR KIT AND SUPPLIES    Accu Chek Sheila meter.  Use to test blood sugars DX:E11.65    BLOOD GLUCOSE MONITOR STRIPS    Accucheck Sheila Test Strips Test blood sugars 4 times daily DX:E11.65    CEPHALEXIN (KEFLEX) 500 MG CAPSULE    Take 1 capsule by mouth 4 times daily for 10 days    DICYCLOMINE (BENTYL) 10 MG CAPSULE    Take 1 capsule by mouth 4 times daily    GABAPENTIN (NEURONTIN) 300 MG CAPSULE    TAKE 1 CAPSULE BY MOUTH 3 TIMES A DAY    INSULIN ASPART (NOVOLOG FLEXPEN) 100 UNIT/ML INJECTION PEN    Inject 25 Units into the skin 3 times daily (before meals)    INSULIN DETEMIR (LEVEMIR FLEXTOUCH) 100 UNIT/ML INJECTION PEN    Inject 85 Units into the skin 2 times daily    INSULIN SYRINGE-NEEDLE U-100 29G X 1/2\" 0.3 ML MISC    1 each by Does not apply route 4 times daily (after meals and at bedtime)    LANCETS MISC    Use to test blood sugars 4 times daily DX:E11.65    LEVOTHYROXINE (SYNTHROID) 50 MCG TABLET    Take 1 tablet by mouth daily    METOCLOPRAMIDE (REGLAN) 10 MG TABLET    Take 1 tablet by mouth 4 times daily    ONDANSETRON (ZOFRAN) 4 MG TABLET    Take 1 tablet by mouth every 8 hours as needed for Nausea or Vomiting    PANTOPRAZOLE (PROTONIX) 40 MG TABLET    Take 1 tablet by mouth 2 times daily (before meals)    PIOGLITAZONE (ACTOS) 45 MG TABLET    Take 1 tablet by mouth daily    SERTRALINE (ZOLOFT) 50 MG TABLET    Take 1 tablet by mouth daily    SUCRALFATE (CARAFATE) 1 GM TABLET    Take 1 tablet by mouth 4 times daily (before meals and nightly)    TAMSULOSIN (FLOMAX) 0.4 MG CAPSULE    Take 1 capsule by mouth daily       ALLERGIES     Pcn [penicillins] and Clindamycin/lincomycin    FAMILY HISTORY       Family History   Problem Relation Age of Onset   Angeles Diabetes Mother     Other Mother         pneumonia, H1N1    Depression Mother     Early Death Mother     High Blood Pressure Mother     High Cholesterol Mother     Vision Loss Maternal Grandmother     Arthritis Maternal Grandfather     Heart Disease Maternal Grandfather         SOCIAL HISTORY       Social History     Socioeconomic History    Marital status:      Spouse name: None    Number of children: 2    Years of education: 15    Highest education level: None   Occupational History    None   Social Needs    Financial resource strain: None    Food insecurity     Worry: None     Inability: None    Transportation needs     Medical: None     Non-medical: None   Tobacco Use    Smoking status: Former Smoker     Packs/day: 5.00     Years: 13.00     Pack years: 65.00     Types: Cigars     Last attempt to quit:      Years since quittin.3    Smokeless tobacco: Never Used   Substance and Sexual Activity    Alcohol use: Not Currently     Alcohol/week: 0.0 standard drinks    Drug use: No    Sexual activity: Yes     Partners: Female   Lifestyle    Physical activity     Days per week: None     Minutes per session: None    Stress: None   Relationships    Social connections     Talks on phone: None     Gets together: None     Attends [employer] service: None     Active member of club or organization: None     Attends meetings of clubs or organizations: None     Relationship status: None    Intimate partner violence     Fear of current or ex partner: None     Emotionally abused: None     Physically abused: None     Forced sexual activity: None   Other Topics Concern    None   Social History Narrative    None       REVIEW OF SYSTEMS     Review of Systems   Constitutional: Negative for chills, diaphoresis, fatigue and fever. HENT: Negative. Eyes: Negative.     Respiratory: Negative for cough, chest tightness and shortness of breath. Cardiovascular: Negative for chest pain and palpitations. Gastrointestinal: Negative for abdominal pain, constipation, diarrhea and nausea. Genitourinary: Negative. Musculoskeletal: Negative for back pain and neck pain. Infection on back of left middle finger   Skin: Positive for wound. Wound noted on back of left middle finger   Neurological: Negative for dizziness, tremors, facial asymmetry, weakness, light-headedness and headaches. Except as noted above the remainder of the review of systems was reviewed and is. PHYSICAL EXAM    (up to 7 for level 4, 8 or more for level 5)     ED Triage Vitals [04/29/20 1446]   BP Temp Temp Source Pulse Resp SpO2 Height Weight   138/86 98.2 °F (36.8 °C) Oral 109 18 96 % -- 235 lb (106.6 kg)       Physical Exam  Constitutional:       General: He is not in acute distress. Appearance: Normal appearance. He is not ill-appearing. HENT:      Head: Normocephalic and atraumatic. Right Ear: External ear normal.      Left Ear: External ear normal.   Neck:      Musculoskeletal: Normal range of motion. Cardiovascular:      Rate and Rhythm: Normal rate and regular rhythm. Pulses: Normal pulses. Heart sounds: Normal heart sounds. Pulmonary:      Effort: Pulmonary effort is normal.      Breath sounds: Normal breath sounds. Abdominal:      General: Abdomen is flat. Palpations: Abdomen is soft. Tenderness: There is no abdominal tenderness. Musculoskeletal: Normal range of motion. General: Swelling and tenderness present. No deformity or signs of injury. Left hand: He exhibits tenderness and swelling. He exhibits normal range of motion, normal capillary refill and no deformity. Normal sensation noted. Normal strength noted. Hands:    Skin:     Capillary Refill: Capillary refill takes less than 2 seconds. Coloration: Skin is not jaundiced or pale. initial evaluation there was some evidence of infection present about the dorsal aspect of the PIP joint of the left hand for which the patient was provided an initial dose of cephalexin as he had reported not picking up his prescription yet. Patient states he does have his prescription in hand and was also told it is available at the pharmacy he just has yet to go to the pharmacy. Patient states he will be leaving the ED and going straight to the pharmacy to  his antibiotics. Of note patient's glucose was elevated at 518, however patient is asymptomatic and reports chronic elevated readings at home. Suspect patient's hyperglycemia is exacerbated by current infection as well as patient's report of not taking his medication today. Reviewed results with patient and recommended compliance with home diabetic medications. Patient demonstrated understanding and stated he would use medication when he got home. Cultures of the wound were obtained for tailoring antibiotic therapy. Recommend patient continue on cephalexin which was prescribed by PCP. Recommend follow-up with orthopedics for monitoring wound improvement and response to treatment. Educated patient on signs and symptoms to return home such as fevers, worsening pain, swelling, or pain out of proportion. Strict return precautions and follow up instructions were discussed with the patient with which the patient agrees. Dr. Amdao Portillo was present, consulted, and in agreement with the plan of care for this patient. ED Medications administered this visit:    Medications   cephALEXin (KEFLEX) capsule 500 mg (500 mg Oral Given 4/29/20 1457)   acetaminophen (TYLENOL) tablet 650 mg (650 mg Oral Given 4/29/20 1457)       DDx:  Cellulitis  Septic arthritis  Hyperglycemia    Procedures: (None if blank)       CLINICAL       1.  Abrasion of left middle finger with infection          DISPOSITION/PLAN   DISPOSITION    Discharge home  Initial dose of

## 2020-04-30 ENCOUNTER — CARE COORDINATION (OUTPATIENT)
Dept: CASE MANAGEMENT | Age: 50
End: 2020-04-30

## 2020-05-01 ENCOUNTER — CARE COORDINATION (OUTPATIENT)
Dept: CASE MANAGEMENT | Age: 50
End: 2020-05-01

## 2020-05-02 LAB
AEROBIC CULTURE: ABNORMAL
AEROBIC CULTURE: ABNORMAL
ANAEROBIC CULTURE: ABNORMAL
GRAM STAIN RESULT: ABNORMAL
ORGANISM: ABNORMAL
ORGANISM: ABNORMAL

## 2020-05-04 ENCOUNTER — TELEPHONE (OUTPATIENT)
Dept: PHARMACY | Age: 50
End: 2020-05-04

## 2020-05-04 RX ORDER — GABAPENTIN 300 MG/1
CAPSULE ORAL
Qty: 90 CAPSULE | Refills: 1 | OUTPATIENT
Start: 2020-05-04 | End: 2020-10-31

## 2020-05-05 ENCOUNTER — TELEPHONE (OUTPATIENT)
Dept: INTERNAL MEDICINE CLINIC | Age: 50
End: 2020-05-05

## 2020-05-05 RX ORDER — DOXYCYCLINE HYCLATE 100 MG
100 TABLET ORAL 2 TIMES DAILY
Qty: 10 TABLET | Refills: 0 | Status: SHIPPED | OUTPATIENT
Start: 2020-05-05 | End: 2020-05-10

## 2020-05-05 NOTE — TELEPHONE ENCOUNTER
----- Message from MARILYN Diamond CNP sent at 5/4/2020  8:47 AM EDT -----  MRSA positive in finger, add Doxycycline 100 mg twice daily x 5 days

## 2020-05-11 ENCOUNTER — TELEPHONE (OUTPATIENT)
Dept: WOUND CARE | Age: 50
End: 2020-05-11

## 2020-05-11 NOTE — TELEPHONE ENCOUNTER
Called pt for covid 19 screening. Pt denies symptoms or being around anyone with symptoms or tested positive.

## 2020-05-12 ENCOUNTER — HOSPITAL ENCOUNTER (INPATIENT)
Age: 50
LOS: 1 days | Discharge: HOME | DRG: 603 | End: 2020-05-14
Attending: EMERGENCY MEDICINE | Admitting: NURSE PRACTITIONER
Payer: MEDICARE

## 2020-05-12 ENCOUNTER — APPOINTMENT (OUTPATIENT)
Dept: CT IMAGING | Age: 50
DRG: 603 | End: 2020-05-12
Payer: MEDICARE

## 2020-05-12 ENCOUNTER — APPOINTMENT (OUTPATIENT)
Dept: INTERVENTIONAL RADIOLOGY/VASCULAR | Age: 50
DRG: 603 | End: 2020-05-12
Payer: MEDICARE

## 2020-05-12 PROBLEM — R60.0 PEDAL EDEMA: Status: ACTIVE | Noted: 2020-05-12

## 2020-05-12 PROBLEM — L03.119 CELLULITIS OF HAND: Status: ACTIVE | Noted: 2020-05-12

## 2020-05-12 PROBLEM — G47.33 OSA (OBSTRUCTIVE SLEEP APNEA): Status: ACTIVE | Noted: 2020-05-12

## 2020-05-12 PROBLEM — E11.65 TYPE 2 DIABETES MELLITUS WITH HYPERGLYCEMIA, WITH LONG-TERM CURRENT USE OF INSULIN (HCC): Status: ACTIVE | Noted: 2019-02-15

## 2020-05-12 LAB
ALBUMIN SERPL-MCNC: 3.9 G/DL (ref 3.5–5.1)
ALP BLD-CCNC: 79 U/L (ref 38–126)
ALT SERPL-CCNC: 32 U/L (ref 11–66)
ANION GAP SERPL CALCULATED.3IONS-SCNC: 16 MEQ/L (ref 8–16)
AST SERPL-CCNC: 39 U/L (ref 5–40)
BASOPHILS # BLD: 0.7 %
BASOPHILS ABSOLUTE: 0.1 THOU/MM3 (ref 0–0.1)
BILIRUB SERPL-MCNC: 0.5 MG/DL (ref 0.3–1.2)
BILIRUBIN DIRECT: < 0.2 MG/DL (ref 0–0.3)
BILIRUBIN URINE: NEGATIVE
BLOOD, URINE: NEGATIVE
BUN BLDV-MCNC: 14 MG/DL (ref 7–22)
CALCIUM SERPL-MCNC: 9.6 MG/DL (ref 8.5–10.5)
CHARACTER, URINE: CLEAR
CHLORIDE BLD-SCNC: 93 MEQ/L (ref 98–111)
CHLORIDE, URINE: 82 MEQ/L
CO2: 21 MEQ/L (ref 23–33)
COLOR: YELLOW
CREAT SERPL-MCNC: 0.8 MG/DL (ref 0.4–1.2)
CREATININE URINE: 85.8 MG/DL
EOSINOPHIL # BLD: 3.5 %
EOSINOPHILS ABSOLUTE: 0.4 THOU/MM3 (ref 0–0.4)
ERYTHROCYTE [DISTWIDTH] IN BLOOD BY AUTOMATED COUNT: 14.3 % (ref 11.5–14.5)
ERYTHROCYTE [DISTWIDTH] IN BLOOD BY AUTOMATED COUNT: 45.9 FL (ref 35–45)
GFR SERPL CREATININE-BSD FRML MDRD: > 90 ML/MIN/1.73M2
GLUCOSE BLD-MCNC: 194 MG/DL (ref 70–108)
GLUCOSE BLD-MCNC: 263 MG/DL (ref 70–108)
GLUCOSE BLD-MCNC: 341 MG/DL (ref 70–108)
GLUCOSE URINE: 500 MG/DL
HCT VFR BLD CALC: 45 % (ref 42–52)
HEMOGLOBIN: 15.3 GM/DL (ref 14–18)
IMMATURE GRANS (ABS): 0.11 THOU/MM3 (ref 0–0.07)
IMMATURE GRANULOCYTES: 0.9 %
KETONES, URINE: NEGATIVE
LEUKOCYTE ESTERASE, URINE: NEGATIVE
LYMPHOCYTES # BLD: 18.9 %
LYMPHOCYTES ABSOLUTE: 2.4 THOU/MM3 (ref 1–4.8)
MCH RBC QN AUTO: 29.9 PG (ref 26–33)
MCHC RBC AUTO-ENTMCNC: 34 GM/DL (ref 32.2–35.5)
MCV RBC AUTO: 88.1 FL (ref 80–94)
MONOCYTES # BLD: 5.9 %
MONOCYTES ABSOLUTE: 0.7 THOU/MM3 (ref 0.4–1.3)
NITRITE, URINE: NEGATIVE
NUCLEATED RED BLOOD CELLS: 0 /100 WBC
OSMOLALITY CALCULATION: 270.4 MOSMOL/KG (ref 275–300)
OSMOLALITY URINE: 547 MOSMOL/KG (ref 250–750)
OSMOLALITY: 289 MOSMOL/KG (ref 275–295)
PH UA: 5 (ref 5–9)
PLATELET # BLD: 244 THOU/MM3 (ref 130–400)
PMV BLD AUTO: 9.5 FL (ref 9.4–12.4)
POTASSIUM REFLEX MAGNESIUM: 3.7 MEQ/L (ref 3.5–5.2)
POTASSIUM, URINE: 31.9 MEQ/L
PRO-BNP: 16.2 PG/ML (ref 0–900)
PROT/CREAT RATIO, UR: 0.13
PROTEIN UA: NEGATIVE
PROTEIN, URINE: 11.2 MG/DL
RBC # BLD: 5.11 MILL/MM3 (ref 4.7–6.1)
SEG NEUTROPHILS: 70.1 %
SEGMENTED NEUTROPHILS ABSOLUTE COUNT: 8.9 THOU/MM3 (ref 1.8–7.7)
SODIUM BLD-SCNC: 130 MEQ/L (ref 135–145)
SODIUM URINE: 88 MEQ/L
SPECIFIC GRAVITY, URINE: > 1.03 (ref 1–1.03)
TOTAL PROTEIN: 7.9 G/DL (ref 6.1–8)
UROBILINOGEN, URINE: 0.2 EU/DL (ref 0–1)
WBC # BLD: 12.7 THOU/MM3 (ref 4.8–10.8)

## 2020-05-12 PROCEDURE — 84300 ASSAY OF URINE SODIUM: CPT

## 2020-05-12 PROCEDURE — 81003 URINALYSIS AUTO W/O SCOPE: CPT

## 2020-05-12 PROCEDURE — 87070 CULTURE OTHR SPECIMN AEROBIC: CPT

## 2020-05-12 PROCEDURE — 87075 CULTR BACTERIA EXCEPT BLOOD: CPT

## 2020-05-12 PROCEDURE — 96366 THER/PROPH/DIAG IV INF ADDON: CPT

## 2020-05-12 PROCEDURE — 96372 THER/PROPH/DIAG INJ SC/IM: CPT

## 2020-05-12 PROCEDURE — 84156 ASSAY OF PROTEIN URINE: CPT

## 2020-05-12 PROCEDURE — 87186 SC STD MICRODIL/AGAR DIL: CPT

## 2020-05-12 PROCEDURE — 96375 TX/PRO/DX INJ NEW DRUG ADDON: CPT

## 2020-05-12 PROCEDURE — 6360000002 HC RX W HCPCS: Performed by: INTERNAL MEDICINE

## 2020-05-12 PROCEDURE — 87147 CULTURE TYPE IMMUNOLOGIC: CPT

## 2020-05-12 PROCEDURE — 87040 BLOOD CULTURE FOR BACTERIA: CPT

## 2020-05-12 PROCEDURE — 83880 ASSAY OF NATRIURETIC PEPTIDE: CPT

## 2020-05-12 PROCEDURE — 36415 COLL VENOUS BLD VENIPUNCTURE: CPT

## 2020-05-12 PROCEDURE — 84133 ASSAY OF URINE POTASSIUM: CPT

## 2020-05-12 PROCEDURE — 6370000000 HC RX 637 (ALT 250 FOR IP): Performed by: INTERNAL MEDICINE

## 2020-05-12 PROCEDURE — 6370000000 HC RX 637 (ALT 250 FOR IP): Performed by: EMERGENCY MEDICINE

## 2020-05-12 PROCEDURE — 93971 EXTREMITY STUDY: CPT

## 2020-05-12 PROCEDURE — G0378 HOSPITAL OBSERVATION PER HR: HCPCS

## 2020-05-12 PROCEDURE — 87077 CULTURE AEROBIC IDENTIFY: CPT

## 2020-05-12 PROCEDURE — 2709999900 HC NON-CHARGEABLE SUPPLY

## 2020-05-12 PROCEDURE — 99282 EMERGENCY DEPT VISIT SF MDM: CPT

## 2020-05-12 PROCEDURE — 80048 BASIC METABOLIC PNL TOTAL CA: CPT

## 2020-05-12 PROCEDURE — 85025 COMPLETE CBC W/AUTO DIFF WBC: CPT

## 2020-05-12 PROCEDURE — 96367 TX/PROPH/DG ADDL SEQ IV INF: CPT

## 2020-05-12 PROCEDURE — 87205 SMEAR GRAM STAIN: CPT

## 2020-05-12 PROCEDURE — 82436 ASSAY OF URINE CHLORIDE: CPT

## 2020-05-12 PROCEDURE — 82570 ASSAY OF URINE CREATININE: CPT

## 2020-05-12 PROCEDURE — 6370000000 HC RX 637 (ALT 250 FOR IP): Performed by: PHYSICIAN ASSISTANT

## 2020-05-12 PROCEDURE — 99220 PR INITIAL OBSERVATION CARE/DAY 70 MINUTES: CPT | Performed by: INTERNAL MEDICINE

## 2020-05-12 PROCEDURE — 2580000003 HC RX 258: Performed by: EMERGENCY MEDICINE

## 2020-05-12 PROCEDURE — 6360000004 HC RX CONTRAST MEDICATION: Performed by: EMERGENCY MEDICINE

## 2020-05-12 PROCEDURE — 83930 ASSAY OF BLOOD OSMOLALITY: CPT

## 2020-05-12 PROCEDURE — 96361 HYDRATE IV INFUSION ADD-ON: CPT

## 2020-05-12 PROCEDURE — 6360000002 HC RX W HCPCS: Performed by: EMERGENCY MEDICINE

## 2020-05-12 PROCEDURE — 80076 HEPATIC FUNCTION PANEL: CPT

## 2020-05-12 PROCEDURE — 82948 REAGENT STRIP/BLOOD GLUCOSE: CPT

## 2020-05-12 PROCEDURE — 96365 THER/PROPH/DIAG IV INF INIT: CPT

## 2020-05-12 PROCEDURE — 83935 ASSAY OF URINE OSMOLALITY: CPT

## 2020-05-12 PROCEDURE — 2500000003 HC RX 250 WO HCPCS: Performed by: EMERGENCY MEDICINE

## 2020-05-12 PROCEDURE — 2580000003 HC RX 258: Performed by: INTERNAL MEDICINE

## 2020-05-12 PROCEDURE — 71275 CT ANGIOGRAPHY CHEST: CPT

## 2020-05-12 RX ORDER — LEVOTHYROXINE SODIUM 0.05 MG/1
50 TABLET ORAL DAILY
Status: DISCONTINUED | OUTPATIENT
Start: 2020-05-13 | End: 2020-05-14 | Stop reason: HOSPADM

## 2020-05-12 RX ORDER — POLYETHYLENE GLYCOL 3350 17 G/17G
17 POWDER, FOR SOLUTION ORAL DAILY PRN
Status: DISCONTINUED | OUTPATIENT
Start: 2020-05-12 | End: 2020-05-14 | Stop reason: HOSPADM

## 2020-05-12 RX ORDER — DICYCLOMINE HYDROCHLORIDE 10 MG/1
10 CAPSULE ORAL 4 TIMES DAILY
Status: DISCONTINUED | OUTPATIENT
Start: 2020-05-12 | End: 2020-05-14 | Stop reason: HOSPADM

## 2020-05-12 RX ORDER — 0.9 % SODIUM CHLORIDE 0.9 %
1000 INTRAVENOUS SOLUTION INTRAVENOUS ONCE
Status: COMPLETED | OUTPATIENT
Start: 2020-05-12 | End: 2020-05-12

## 2020-05-12 RX ORDER — ACETAMINOPHEN 325 MG/1
650 TABLET ORAL EVERY 6 HOURS PRN
Status: DISCONTINUED | OUTPATIENT
Start: 2020-05-12 | End: 2020-05-14 | Stop reason: HOSPADM

## 2020-05-12 RX ORDER — METOCLOPRAMIDE 10 MG/1
10 TABLET ORAL 4 TIMES DAILY
Status: DISCONTINUED | OUTPATIENT
Start: 2020-05-12 | End: 2020-05-14 | Stop reason: HOSPADM

## 2020-05-12 RX ORDER — PANTOPRAZOLE SODIUM 40 MG/1
40 TABLET, DELAYED RELEASE ORAL
Status: DISCONTINUED | OUTPATIENT
Start: 2020-05-12 | End: 2020-05-14 | Stop reason: HOSPADM

## 2020-05-12 RX ORDER — LISINOPRIL 5 MG/1
5 TABLET ORAL DAILY
Status: DISCONTINUED | OUTPATIENT
Start: 2020-05-13 | End: 2020-05-14 | Stop reason: HOSPADM

## 2020-05-12 RX ORDER — SUCRALFATE 1 G/1
1 TABLET ORAL
Status: DISCONTINUED | OUTPATIENT
Start: 2020-05-12 | End: 2020-05-14 | Stop reason: HOSPADM

## 2020-05-12 RX ORDER — FUROSEMIDE 10 MG/ML
40 INJECTION INTRAMUSCULAR; INTRAVENOUS 2 TIMES DAILY
Status: DISCONTINUED | OUTPATIENT
Start: 2020-05-12 | End: 2020-05-13

## 2020-05-12 RX ORDER — GABAPENTIN 300 MG/1
300 CAPSULE ORAL 3 TIMES DAILY
Status: DISCONTINUED | OUTPATIENT
Start: 2020-05-12 | End: 2020-05-14 | Stop reason: HOSPADM

## 2020-05-12 RX ORDER — SULFAMETHOXAZOLE AND TRIMETHOPRIM 400; 80 MG/1; MG/1
1 TABLET ORAL 2 TIMES DAILY
Qty: 20 TABLET | Refills: 0 | Status: SHIPPED | OUTPATIENT
Start: 2020-05-12 | End: 2020-05-14 | Stop reason: HOSPADM

## 2020-05-12 RX ORDER — TAMSULOSIN HYDROCHLORIDE 0.4 MG/1
0.4 CAPSULE ORAL DAILY
Status: DISCONTINUED | OUTPATIENT
Start: 2020-05-12 | End: 2020-05-14 | Stop reason: HOSPADM

## 2020-05-12 RX ORDER — HYDROCODONE BITARTRATE AND ACETAMINOPHEN 5; 325 MG/1; MG/1
2 TABLET ORAL ONCE
Status: COMPLETED | OUTPATIENT
Start: 2020-05-12 | End: 2020-05-12

## 2020-05-12 RX ORDER — SODIUM CHLORIDE 0.9 % (FLUSH) 0.9 %
10 SYRINGE (ML) INJECTION EVERY 12 HOURS SCHEDULED
Status: DISCONTINUED | OUTPATIENT
Start: 2020-05-12 | End: 2020-05-14 | Stop reason: HOSPADM

## 2020-05-12 RX ORDER — INSULIN GLARGINE 100 [IU]/ML
85 INJECTION, SOLUTION SUBCUTANEOUS 2 TIMES DAILY
Status: DISCONTINUED | OUTPATIENT
Start: 2020-05-12 | End: 2020-05-14 | Stop reason: HOSPADM

## 2020-05-12 RX ORDER — ONDANSETRON 4 MG/1
4 TABLET, FILM COATED ORAL EVERY 8 HOURS PRN
Status: DISCONTINUED | OUTPATIENT
Start: 2020-05-12 | End: 2020-05-14 | Stop reason: HOSPADM

## 2020-05-12 RX ORDER — SODIUM CHLORIDE 0.9 % (FLUSH) 0.9 %
10 SYRINGE (ML) INJECTION PRN
Status: DISCONTINUED | OUTPATIENT
Start: 2020-05-12 | End: 2020-05-14 | Stop reason: HOSPADM

## 2020-05-12 RX ORDER — HYDROCODONE BITARTRATE AND ACETAMINOPHEN 5; 325 MG/1; MG/1
2 TABLET ORAL EVERY 6 HOURS PRN
Status: DISCONTINUED | OUTPATIENT
Start: 2020-05-12 | End: 2020-05-14 | Stop reason: HOSPADM

## 2020-05-12 RX ORDER — ONDANSETRON 2 MG/ML
4 INJECTION INTRAMUSCULAR; INTRAVENOUS EVERY 6 HOURS PRN
Status: DISCONTINUED | OUTPATIENT
Start: 2020-05-12 | End: 2020-05-14 | Stop reason: HOSPADM

## 2020-05-12 RX ORDER — ACETAMINOPHEN 650 MG/1
650 SUPPOSITORY RECTAL EVERY 6 HOURS PRN
Status: DISCONTINUED | OUTPATIENT
Start: 2020-05-12 | End: 2020-05-14 | Stop reason: HOSPADM

## 2020-05-12 RX ORDER — PROMETHAZINE HYDROCHLORIDE 25 MG/1
12.5 TABLET ORAL EVERY 6 HOURS PRN
Status: DISCONTINUED | OUTPATIENT
Start: 2020-05-12 | End: 2020-05-14 | Stop reason: HOSPADM

## 2020-05-12 RX ORDER — CLINDAMYCIN PHOSPHATE 600 MG/50ML
600 INJECTION INTRAVENOUS ONCE
Status: COMPLETED | OUTPATIENT
Start: 2020-05-12 | End: 2020-05-12

## 2020-05-12 RX ADMIN — INSULIN LISPRO 1 UNITS: 100 INJECTION, SOLUTION INTRAVENOUS; SUBCUTANEOUS at 17:48

## 2020-05-12 RX ADMIN — METOCLOPRAMIDE 10 MG: 10 TABLET ORAL at 21:22

## 2020-05-12 RX ADMIN — DICYCLOMINE HYDROCHLORIDE 10 MG: 10 CAPSULE ORAL at 21:22

## 2020-05-12 RX ADMIN — IOPAMIDOL 80 ML: 755 INJECTION, SOLUTION INTRAVENOUS at 12:32

## 2020-05-12 RX ADMIN — CLINDAMYCIN PHOSPHATE 600 MG: 600 INJECTION, SOLUTION INTRAVENOUS at 10:29

## 2020-05-12 RX ADMIN — ENOXAPARIN SODIUM 40 MG: 40 INJECTION SUBCUTANEOUS at 17:23

## 2020-05-12 RX ADMIN — HYDROCODONE BITARTRATE AND ACETAMINOPHEN 2 TABLET: 5; 325 TABLET ORAL at 21:27

## 2020-05-12 RX ADMIN — SODIUM CHLORIDE 1000 ML: 9 INJECTION, SOLUTION INTRAVENOUS at 14:17

## 2020-05-12 RX ADMIN — DICYCLOMINE HYDROCHLORIDE 10 MG: 10 CAPSULE ORAL at 17:23

## 2020-05-12 RX ADMIN — INSULIN LISPRO 25 UNITS: 100 INJECTION, SOLUTION INTRAVENOUS; SUBCUTANEOUS at 17:48

## 2020-05-12 RX ADMIN — GABAPENTIN 300 MG: 300 CAPSULE ORAL at 17:23

## 2020-05-12 RX ADMIN — PANTOPRAZOLE SODIUM 40 MG: 40 TABLET, DELAYED RELEASE ORAL at 17:24

## 2020-05-12 RX ADMIN — ACETAMINOPHEN 650 MG: 325 TABLET ORAL at 17:23

## 2020-05-12 RX ADMIN — SUCRALFATE 1 G: 1 TABLET ORAL at 17:23

## 2020-05-12 RX ADMIN — SUCRALFATE 1 G: 1 TABLET ORAL at 21:22

## 2020-05-12 RX ADMIN — HYDROCODONE BITARTRATE AND ACETAMINOPHEN 2 TABLET: 5; 325 TABLET ORAL at 11:57

## 2020-05-12 RX ADMIN — SODIUM CHLORIDE, PRESERVATIVE FREE 10 ML: 5 INJECTION INTRAVENOUS at 21:23

## 2020-05-12 RX ADMIN — MUPIROCIN: 20 OINTMENT TOPICAL at 19:33

## 2020-05-12 RX ADMIN — VANCOMYCIN HYDROCHLORIDE 1250 MG: 5 INJECTION, POWDER, LYOPHILIZED, FOR SOLUTION INTRAVENOUS at 12:50

## 2020-05-12 RX ADMIN — INSULIN GLARGINE 85 UNITS: 100 INJECTION, SOLUTION SUBCUTANEOUS at 21:23

## 2020-05-12 RX ADMIN — GABAPENTIN 300 MG: 300 CAPSULE ORAL at 21:22

## 2020-05-12 RX ADMIN — FUROSEMIDE 40 MG: 40 INJECTION, SOLUTION INTRAMUSCULAR; INTRAVENOUS at 17:23

## 2020-05-12 ASSESSMENT — PAIN DESCRIPTION - ONSET
ONSET: ON-GOING
ONSET: ON-GOING

## 2020-05-12 ASSESSMENT — PAIN SCALES - GENERAL
PAINLEVEL_OUTOF10: 8
PAINLEVEL_OUTOF10: 6
PAINLEVEL_OUTOF10: 8
PAINLEVEL_OUTOF10: 9
PAINLEVEL_OUTOF10: 8
PAINLEVEL_OUTOF10: 6
PAINLEVEL_OUTOF10: 8

## 2020-05-12 ASSESSMENT — PAIN DESCRIPTION - ORIENTATION
ORIENTATION: RIGHT
ORIENTATION: RIGHT

## 2020-05-12 ASSESSMENT — ENCOUNTER SYMPTOMS
SHORTNESS OF BREATH: 0
CHEST TIGHTNESS: 0
BACK PAIN: 0
TROUBLE SWALLOWING: 0
VOMITING: 0
BLOOD IN STOOL: 0
ROS SKIN COMMENTS: INFECTION
COUGH: 0
ABDOMINAL PAIN: 0
NAUSEA: 0
DIARRHEA: 0
VOICE CHANGE: 0

## 2020-05-12 ASSESSMENT — PAIN DESCRIPTION - PROGRESSION
CLINICAL_PROGRESSION: NOT CHANGED
CLINICAL_PROGRESSION: NOT CHANGED

## 2020-05-12 ASSESSMENT — PAIN DESCRIPTION - FREQUENCY
FREQUENCY: CONTINUOUS
FREQUENCY: CONTINUOUS

## 2020-05-12 ASSESSMENT — PAIN DESCRIPTION - PAIN TYPE
TYPE: ACUTE PAIN
TYPE: ACUTE PAIN

## 2020-05-12 ASSESSMENT — PAIN DESCRIPTION - DESCRIPTORS
DESCRIPTORS: THROBBING
DESCRIPTORS: THROBBING

## 2020-05-12 ASSESSMENT — PAIN DESCRIPTION - LOCATION
LOCATION: FINGER (COMMENT WHICH ONE)
LOCATION: FINGER (COMMENT WHICH ONE)

## 2020-05-12 NOTE — PROGRESS NOTES
the 24 hours ending 05/12/20 1525    Cultures/Sensitivites  Date Source Result   5/12/2020 BC1 ngtd   5/12/2020 BC2 ngtd   5/12/2020 R 5th digit swab [facility] singly/prs  GPB       Ht Readings from Last 1 Encounters:   05/12/20 5' 6\" (1.676 m)        Wt Readings from Last 1 Encounters:   05/12/20 235 lb (106.6 kg)         Body mass index is 37.93 kg/m². Estimated Creatinine Clearance: 124 mL/min (based on SCr of 0.8 mg/dL). Goal Trough Level: 10-15 mcg/mL    Assessment/Plan:  Vancomycin 1250mg x 1 given in ED 5/12/2020 @ 1250  Will initiate vancomycin  1500 mg IV every 12 hours. Timing of trough level will be determined based on culture results, renal function, and clinical response. Thank you for the consult. Will continue to follow.

## 2020-05-12 NOTE — CONSULTS
diseases classified elsewhere     Sleep apnea     no cpap    Thyroid disease     WD-Skin ulcer of fourth toe of right foot with necrosis of bone ([facility] 6/29/2016       PAST SURGICAL HISTORY     Past Surgical History:   Procedure Laterality Date    ABSCESS DRAINAGE Right     foot    AMPUTATION ABOVE KNEE Right 4/18/2019    RIGHT ABOVE KNEE AMPUTATION performed by Felisa Small MD at [address] [facility], LAPAROSCOPIC N/A 4/1/2020    ROBOTIC CHOLECYSTECTOMY performed by Marco A Ferris MD at [address] [facility], DIAGNOSTIC      FOOT DEBRIDEMENT Right 07/01/2016    I & D    INCISION AND DRAINAGE Right 2/18/2019    I&D RIGHT STUMP performed by Felisa Small MD at [address] [facility] [facility] Right 07/20/2016    LEG AMPUTATION BELOW KNEE Right 4/4/2019    I&D AND REVISION OF AMPUTATION RIGHT LEG performed by Felisa Small MD at [address] [facility] Right 1/14/15    sole of foot I&D    [place] DRAIN INFECT SHOULDER BURSA Left 8/18/2017    LEFT SHOULDER INCISION AND DRAINAGE performed by Felisa Small MD at [address] [facility] OFFICE/OUTPT [facility] Right 9/20/2018    EXCISIONAL DEBRIDEMENT RIGHT BKA STUMP performed by Haile Griffiths MD at [address] [facility] Right 1/16/15    2nd toe with wound vac applied    UPPER GASTROINTESTINAL ENDOSCOPY N/A 3/3/2020    EGD DILATION SAVORY performed by Jazmin Gaines MD at [address] [facility]  ? when         MEDICATIONS:       Scheduled Meds:   dicyclomine  10 mg Oral 4x Daily    gabapentin  300 mg Oral TID    insulin lispro  25 Units Subcutaneous TID WC    [START ON 5/13/2020] levothyroxine  50 mcg Oral Daily    metoclopramide  10 mg Oral 4x Daily    pantoprazole  40 mg Oral BID AC    sertraline  50 mg Oral Daily    sucralfate  1 g Oral 4x Daily AC & HS    tamsulosin  0.4 mg Oral Daily    furosemide  40 mg Intravenous BID    insulin lispro  0-6 Units Subcutaneous TID WC    [START ON Z87.898    Physical debility R53.81    Anemia D64.9    Electrolyte imbalance E87.8    Type 2 diabetes mellitus with hyperglycemia, with long-term current use of insulin ([facility] E11.65, Z79.4    Weakness R53.1    Infection of above knee amputation stump of right leg ([facility]) T87.43    Above knee amputation of right lower extremity ([facility]) Z71.816X    Sepsis ([facility]) A41.9    Vitamin D deficiency E55.9    COPD exacerbation ([facility]) J44.1    Influenza B J10.1    Depression, recurrent ([facility]) F33.9    Major depressive disorder, recurrent ([facility]) F33.9    GI bleed K92.2    Elevated lipase R74.8    Pressure ulcer of left calf, unstageable ([facility]) L89.890    Other psychoactive substance abuse, uncomplicated ([facility]) E81.06    Calculus of gallbladder without cholecystitis without obstruction K80.20    Right upper quadrant abdominal pain R10.11    Cellulitis of hand L03.119    Pedal edema R60.0    MURALI (obstructive sleep apnea) G47.33           Impression and Recommendation:   Right hand 5th finger cellulites: continue iv vancomycin. Will apply mupirocin ointment daily and cover the finger  Advised against picking of the wound  bethadine to the scabbed wound  Infection Control:   Contact isolation   Thank you Jessica Collins MD for allowing me to participate in this patient's care.     Carla Shrestha MD,FACP 5/12/2020 5:00 PM

## 2020-05-12 NOTE — H&P
by mouth daily 4/20/20   MARILYN Cesar CNP   ondansetron (ZOFRAN) 4 MG tablet Take 1 tablet by mouth every 8 hours as needed for Nausea or Vomiting 3/24/20   Marco A Ferris MD   levothyroxine (SYNTHROID) 50 MCG tablet Take 1 tablet by mouth daily 3/23/20   MARILYN Cesar CNP   tamsulosin [facility]) 0.4 MG capsule Take 1 capsule by mouth daily 3/23/20   Rox Bentley MD   dicyclomine (BENTYL) 10 MG capsule Take 1 capsule by mouth 4 times daily 3/11/20   MARILYN Cesar CNP   metoclopramide (REGLAN) 10 MG tablet Take 1 tablet by mouth 4 times daily 3/11/20   MARILYN Cesar CNP   pantoprazole (PROTONIX) 40 MG tablet Take 1 tablet by mouth 2 times daily (before meals) 3/3/20   Anabella Onofre MD   sucralfate (CARAFATE) 1 GM tablet Take 1 tablet by mouth 4 times daily (before meals and nightly) 3/3/20   Anabella Onofre MD   amLODIPine (NORVASC) 2.5 MG tablet Take 1 tablet by mouth daily 2/19/20   MARILYN Cesar CNP   sertraline (ZOLOFT) 50 MG tablet Take 1 tablet by mouth daily 2/19/20   MARILYN Cesar CNP   Lancets MISC Use to test blood sugars 4 times daily DX:E11.65 2/19/20   MARILYN Cesra CNP   AMINO ACIDS COMPLEX PO Take 1 each by mouth daily Daily AA drink    Historical Provider, MD   blood glucose monitor kit and supplies Accu Chek Sheila meter. Use to test blood sugars DX:E11.65 10/8/19   MARILYN eCsar CNP   blood glucose monitor strips Accucheck Sheila Test Strips Test blood sugars 4 times daily DX:E11.65 10/8/19   MARILYN Cesar CNP   Insulin Syringe-Needle U-100 29G X 1/2\" 0.3 ML MISC 1 each by Does not apply route 4 times daily (after meals and at bedtime) 4/30/19   Nupur Vázquez MD       Allergies:  Pcn [penicillins] and Clindamycin/lincomycin    Social History:      The patient currently lives  At home    TOBACCO:   reports that he quit smoking about 35 years ago. His smoking use included cigars.  He has a 65.00 pack-year smoking history. He has never used smokeless tobacco.  ETOH:   reports previous alcohol use. Family History:            Problem Relation Age of Onset    Diabetes Mother     Other Mother         pneumonia, H1N1    Depression Mother     Early Death Mother     High Blood Pressure Mother     High Cholesterol Mother     Vision Loss Maternal Grandmother     Arthritis Maternal Grandfather     Heart Disease Maternal Grandfather        Diet:  No diet orders on file    REVIEW OF SYSTEMS:   Pertinent positives as noted in the HPI. All other systems reviewed and negative. PHYSICAL EXAM:    BP (!) 140/83   Pulse 115   Temp 98.3 °F (36.8 °C) (Oral)   Resp 18   Ht 5' 6\" (1.676 m)   Wt 235 lb (106.6 kg)   SpO2 99%   BMI 37.93 kg/m²     General appearance:  No apparent distress, appears stated age and cooperative. HEENT:  Normal cephalic, atraumatic without obvious deformity. Pupils equal, round, and reactive to light. Extra ocular muscles intact. Conjunctivae/corneas clear. Neck: Supple, with full range of motion. No jugular venous distention. Trachea midline. Respiratory:  Normal respiratory effort. Clear to auscultation, bilaterally without Rales/Wheezes/Rhonchi. Cardiovascular:  Regular rate and rhythm with normal S1/S2 without murmurs, rubs or gallops. Abdomen: Soft, non-tender, non-distended with normal bowel sounds. Musculoskeletal:  No clubbing, cyanosis or edema bilaterally. Full range of motion without deformity. Skin: Skin color, texture, turgor normal.  No rashes or lesions. Neurologic:  Neurovascularly intact without any focal sensory/motor deficits.  Cranial nerves: II-XII intact, grossly non-focal.  Psychiatric:  Alert and oriented, thought content appropriate, normal insight  Capillary Refill: Brisk,< 3 seconds   Peripheral Pulses: +2 palpable, equal bilaterally       Labs:     Recent Labs     05/12/20  1023   WBC 12.7*   HGB 15.3   HCT 45.0        Recent Labs     05/12/20  1023   *   K 3.7   CL 93*   CO2 21*   BUN 14   CREATININE 0.8   CALCIUM 9.6     No results for input(s): AST, ALT, BILIDIR, BILITOT, ALKPHOS in the last 72 hours. No results for input(s): INR in the last 72 hours. No results for input(s): Mingo Contreras in the last 72 hours. Urinalysis:      Lab Results   Component Value Date    NITRU NEGATIVE 04/03/2020    WBCUA 0-2 03/02/2020    BACTERIA NONE SEEN 03/02/2020    RBCUA 0-2 03/02/2020    BLOODU NEGATIVE 04/03/2020    SPECGRAV >1.030 06/06/2019    GLUCOSEU >= 1000 04/03/2020       Intake & Output:  No intake/output data recorded. No intake/output data recorded. Radiology:     CXR: I have reviewed the CXR with the following interpretation: none  EKG:  I have reviewed the EKG with the following interpretation: none    CTA CHEST W WO CONTRAST   Final Result      No acute pulmonary arterial embolism. No discrete lobar consolidation. **This report has been created using voice recognition software. It may contain minor errors which are inherent in voice recognition technology. **      Final report electronically signed by Dr. Rahel Galdamez on 5/12/2020 12:55 PM           DVT prophylaxis: lovenox    Code Status: Prior      PT/OT Eval Status:  none    [facility] Problems    Diagnosis Date Noted    Cellulitis of hand [L03.119] 05/12/2020     Priority: High    Pedal edema [R60.0] 05/12/2020     Priority: High    Hyponatremia [E87.1]      Priority: High    Type 2 diabetes mellitus with hyperglycemia, with long-term current use of insulin (HCC) [E11.65, Z79.4] 02/15/2019     Priority: Medium    Essential hypertension [I10]      Priority: Medium    Bipolar 1 disorder ([facility] [address] [F31.9]      Priority: Low    MURALI (obstructive sleep apnea) [G47.33] 05/12/2020       PLAN:    Skin picking and cellulitis of hand left index finger-?   Small furuncles -scabbed ,history of MRSA, failed outpatient antibiotic Keflex and

## 2020-05-12 NOTE — ED PROVIDER NOTES
[address] [address] EMERGENCY DEPT    EMERGENCY MEDICINE     Pt Name: Anamaira Meza  MRN: [id number]  [place] 1970  Date of evaluation: 5/12/2020  Provider: Abraham Villa DO, [place] COMPLAINT       Chief Complaint   Patient presents with    Other     right pinky finger red and swollen       HISTORY OF PRESENT ILLNESS    Anamaria Meza is a 44 y.o. male who presents to the emergency department from home for evaluation of right pinky redness, swelling, and pain. No fever, no chills, no other associated symptoms. He is currently being treated with doxycycline and Keflex for infection of the left middle finger which she states has improved, although he continues to have a small wound present there. He denies any pain with range of motion of the fingers or hand. He has not had any chest pain or shortness of breath. Has a history of uncontrolled diabetes previously. Patient has scheduled follow-up appointment tomorrow with infectious disease Dr. Maryanne Morrison for the left finger infection      Triage notes and Nursing notes were reviewed by myself. Any discrepancies are addressed above. PAST MEDICAL HISTORY     Past Medical History:   Diagnosis Date    Bipolar 2 disorder [facility])     previously followed with Dr. Sterling Lopez and Dylan Winston in [facility] BPH (benign prostatic hyperplasia)     Diabetes mellitus type 2, uncontrolled ([facility] [address]     HgbA1c on 4/2/2019 was 9.1.     Diabetic peripheral neuropathy ([facility] [address]     Diabetic polyneuropathy ([address]     Diabetic ulcer of right foot associated with type 2 diabetes mellitus ([address] 12/10/2015    Essential hypertension     \"never been on b/p medication that I know of\"    GERD (gastroesophageal reflux disease)     Hammer toe of left foot     Heart murmur     denies any chest pain or palpitations    History of tobacco abuse     Hx of AKA (above knee amputation), right ([address] 04/18/2019    Dr. Cristina Cheung edema, diabetic, bilateral ([address] infection   Neurological: Negative for speech difficulty, weakness, numbness and headaches. Psychiatric/Behavioral: Negative for confusion. Except as noted above the remainder of the review of systems was reviewed and is. PHYSICAL EXAM    (up to 7 for level 4, 8 or more for level 5)     ED Triage Vitals [05/12/20 0940]   BP Temp Temp Source Pulse Resp SpO2 Height Weight   (!) 140/83 98.3 °F (36.8 °C) Oral 114 18 100 % 5' 6\" (1.676 m) 235 lb (106.6 kg)       Physical Exam  Vitals signs and nursing note reviewed. Constitutional:       General: He is not in acute distress. Appearance: He is well-developed. He is not ill-appearing, toxic-appearing or diaphoretic. HENT:      Head: Normocephalic and atraumatic. Eyes:      General: No scleral icterus. Conjunctiva/sclera: Conjunctivae normal.      Right eye: Right conjunctiva is not injected. Left eye: Left conjunctiva is not injected. Pupils: Pupils are equal, round, and reactive to light. Neck:      Musculoskeletal: Normal range of motion and neck supple. Thyroid: No thyromegaly. Trachea: No tracheal deviation. Cardiovascular:      Rate and Rhythm: Normal rate and regular rhythm. Heart sounds: Normal heart sounds. No murmur. No friction rub. No gallop. Pulmonary:      Effort: Pulmonary effort is normal. No respiratory distress. Breath sounds: Normal breath sounds. No stridor. No wheezing or rales. Abdominal:      General: Bowel sounds are normal. There is no distension. Palpations: Abdomen is soft. There is no mass. Tenderness: There is no abdominal tenderness. There is no guarding or rebound. Comments: Negative Bernardo's sign  Nontender McBurney's Point  Negative Rovsig's sign  No bruising or echymosis of abdomen   Musculoskeletal:         General: No tenderness.       Comments: Negative Eugene's Sign bilaterally  Right pinky finger dorsal surface just proximal to the PIP there is an area of cellulitis, his wound is slightly open with minimal drainage able to be expressed. He has great range of motion of all the joints of the finger, PIP and DIP. His distal sensation is intact. There is no pain with range of motion, only with palpation. There is no fluctuance present there. There is no pain along the flexor tendons and no pain with passive stretching   Lymphadenopathy:      Cervical: No cervical adenopathy. Skin:     General: Skin is warm and dry. Coloration: Skin is not pale. Findings: No erythema or rash. Neurological:      Mental Status: He is alert and oriented to person, place, and time. Cranial Nerves: No cranial nerve deficit. Motor: No abnormal muscle tone. Coordination: Coordination normal.      Comments: No nystagmus   Psychiatric:         Behavior: Behavior normal.         Thought Content: Thought content normal.         DIAGNOSTIC RESULTS     EKG:(none if blank)  All EKG's are interpreted [facility] Phys[facility] who either signs or Co-signs this chart in the absence of a cardiologist.        RADIOLOGY: (none if blank)   Interpretation per the Radiologistbelow, if available at the time of this note:    CTA CHEST W WO CONTRAST   Final Result      No acute pulmonary arterial embolism. No discrete lobar consolidation. **This report has been created using voice recognition software. It may contain minor errors which are inherent in voice recognition technology. **      Final report electronically signed by Dr. Juma Shah on 5/12/2020 12:55 PM          LABS:  Labs Reviewed   CBC WITH AUTO DIFFERENTIAL - Abnormal; Notable for the following components:       Result Value    WBC 12.7 (*)     RDW-SD 45.9 (*)     Segs Absolute 8.9 (*)     Immature Grans (Abs) 0.11 (*)     All other components within normal limits   BASIC METABOLIC PANEL W/ REFLEX TO MG FOR LOW K - Abnormal; Notable for the following components:    Sodium 130 (*)     Chloride 93 (*) setting of recent infection of the left middle finger. Although this does not appear to be distal, endocarditis and embolism certainly is under consideration. I did not appreciate a cardiac murmur at this time. ED Medications administered this visit:    Medications   vancomycin (VANCOCIN) 1,250 mg in dextrose 5 % 250 mL IVPB (1,250 mg Intravenous New Bag 5/12/20 1250)   0.9 % sodium chloride bolus (has no administration in time range)   clindamycin (CLEOCIN) 600 mg in dextrose 5 % 50 mL IVPB (0 mg Intravenous Stopped 5/12/20 1157)   HYDROcodone-acetaminophen (NORCO) 5-325 MG per tablet 2 tablet (2 tablets Oral Given 5/12/20 1157)   iopamidol (ISOVUE-370) 76 % injection 80 mL (80 mLs Intravenous Given 5/12/20 1232)         Procedures: (None if blank)       CLINICAL       1. Finger infection    2. Tachycardia    3.  Hyperglycemia          DISPOSITION/PLAN   DISPOSITION Admitted 05/12/2020 01:31:51 PM      PATIENT REFERRED TO:  [facility] [address] [facility]  [address]   [phone]    In 1 day      MARILYN Dover CNP/ [address] [address]  [phone]            DISCHARGE MEDICATIONS:  New Prescriptions    SULFAMETHOXAZOLE-TRIMETHOPRIM (BACTRIM;SEPTRA) 400-80 MG PER TABLET    Take 1 tablet by mouth 2 times daily for 10 days              (Please note that portions of this note were completed with a voice recognition program.  Efforts were made to edit the dictations but occasionallywords are mis-transcribed.)      Ivy Mac DO,FACEOLIVIA (electronically signed)  Attending Physician, Emergency [address], DO  05/12/20 5413

## 2020-05-13 LAB
ANION GAP SERPL CALCULATED.3IONS-SCNC: 13 MEQ/L (ref 8–16)
BASOPHILS # BLD: 0.1 %
BASOPHILS ABSOLUTE: 0 THOU/MM3 (ref 0–0.1)
BUN BLDV-MCNC: 21 MG/DL (ref 7–22)
C-REACTIVE PROTEIN: 1.6 MG/DL (ref 0–1)
CALCIUM SERPL-MCNC: 8.7 MG/DL (ref 8.5–10.5)
CHLORIDE BLD-SCNC: 92 MEQ/L (ref 98–111)
CO2: 22 MEQ/L (ref 23–33)
CREAT SERPL-MCNC: 0.9 MG/DL (ref 0.4–1.2)
EOSINOPHIL # BLD: 6.5 %
EOSINOPHILS ABSOLUTE: 0.6 THOU/MM3 (ref 0–0.4)
ERYTHROCYTE [DISTWIDTH] IN BLOOD BY AUTOMATED COUNT: 14.4 % (ref 11.5–14.5)
ERYTHROCYTE [DISTWIDTH] IN BLOOD BY AUTOMATED COUNT: 46 FL (ref 35–45)
GFR SERPL CREATININE-BSD FRML MDRD: 89 ML/MIN/1.73M2
GLUCOSE BLD-MCNC: 242 MG/DL (ref 70–108)
GLUCOSE BLD-MCNC: 270 MG/DL (ref 70–108)
GLUCOSE BLD-MCNC: 274 MG/DL (ref 70–108)
GLUCOSE BLD-MCNC: 291 MG/DL (ref 70–108)
GLUCOSE BLD-MCNC: 334 MG/DL (ref 70–108)
HCT VFR BLD CALC: 41.4 % (ref 42–52)
HEMOGLOBIN: 13.9 GM/DL (ref 14–18)
IMMATURE GRANS (ABS): 0.08 THOU/MM3 (ref 0–0.07)
IMMATURE GRANULOCYTES: 0.9 %
LV EF: 60 %
LVEF MODALITY: NORMAL
LYMPHOCYTES # BLD: 21 %
LYMPHOCYTES ABSOLUTE: 1.9 THOU/MM3 (ref 1–4.8)
MCH RBC QN AUTO: 29.6 PG (ref 26–33)
MCHC RBC AUTO-ENTMCNC: 33.6 GM/DL (ref 32.2–35.5)
MCV RBC AUTO: 88.3 FL (ref 80–94)
MONOCYTES # BLD: 8 %
MONOCYTES ABSOLUTE: 0.7 THOU/MM3 (ref 0.4–1.3)
NUCLEATED RED BLOOD CELLS: 0 /100 WBC
PLATELET # BLD: 206 THOU/MM3 (ref 130–400)
PMV BLD AUTO: 9.3 FL (ref 9.4–12.4)
POTASSIUM REFLEX MAGNESIUM: 3.8 MEQ/L (ref 3.5–5.2)
RBC # BLD: 4.69 MILL/MM3 (ref 4.7–6.1)
SEDIMENTATION RATE, ERYTHROCYTE: 24 MM/HR (ref 0–10)
SEG NEUTROPHILS: 63.5 %
SEGMENTED NEUTROPHILS ABSOLUTE COUNT: 5.7 THOU/MM3 (ref 1.8–7.7)
SODIUM BLD-SCNC: 127 MEQ/L (ref 135–145)
WBC # BLD: 9 THOU/MM3 (ref 4.8–10.8)

## 2020-05-13 PROCEDURE — 85025 COMPLETE CBC W/AUTO DIFF WBC: CPT

## 2020-05-13 PROCEDURE — 96375 TX/PRO/DX INJ NEW DRUG ADDON: CPT

## 2020-05-13 PROCEDURE — 6360000002 HC RX W HCPCS: Performed by: INTERNAL MEDICINE

## 2020-05-13 PROCEDURE — 93306 TTE W/DOPPLER COMPLETE: CPT

## 2020-05-13 PROCEDURE — 96366 THER/PROPH/DIAG IV INF ADDON: CPT

## 2020-05-13 PROCEDURE — 2580000003 HC RX 258: Performed by: INTERNAL MEDICINE

## 2020-05-13 PROCEDURE — 99232 SBSQ HOSP IP/OBS MODERATE 35: CPT | Performed by: NURSE PRACTITIONER

## 2020-05-13 PROCEDURE — 96372 THER/PROPH/DIAG INJ SC/IM: CPT

## 2020-05-13 PROCEDURE — 6370000000 HC RX 637 (ALT 250 FOR IP): Performed by: PHYSICIAN ASSISTANT

## 2020-05-13 PROCEDURE — 6370000000 HC RX 637 (ALT 250 FOR IP): Performed by: INTERNAL MEDICINE

## 2020-05-13 PROCEDURE — G0378 HOSPITAL OBSERVATION PER HR: HCPCS

## 2020-05-13 PROCEDURE — 86140 C-REACTIVE PROTEIN: CPT

## 2020-05-13 PROCEDURE — 82948 REAGENT STRIP/BLOOD GLUCOSE: CPT

## 2020-05-13 PROCEDURE — 80048 BASIC METABOLIC PNL TOTAL CA: CPT

## 2020-05-13 PROCEDURE — 96376 TX/PRO/DX INJ SAME DRUG ADON: CPT

## 2020-05-13 PROCEDURE — 85651 RBC SED RATE NONAUTOMATED: CPT

## 2020-05-13 PROCEDURE — 36415 COLL VENOUS BLD VENIPUNCTURE: CPT

## 2020-05-13 PROCEDURE — 6360000002 HC RX W HCPCS: Performed by: PHYSICIAN ASSISTANT

## 2020-05-13 RX ORDER — DIPHENHYDRAMINE HYDROCHLORIDE 50 MG/ML
50 INJECTION INTRAMUSCULAR; INTRAVENOUS ONCE
Status: COMPLETED | OUTPATIENT
Start: 2020-05-13 | End: 2020-05-13

## 2020-05-13 RX ORDER — CEPHALEXIN 500 MG/1
500 CAPSULE ORAL EVERY 8 HOURS SCHEDULED
Status: DISCONTINUED | OUTPATIENT
Start: 2020-05-13 | End: 2020-05-14 | Stop reason: HOSPADM

## 2020-05-13 RX ORDER — DEXTROSE MONOHYDRATE 25 G/50ML
12.5 INJECTION, SOLUTION INTRAVENOUS PRN
Status: DISCONTINUED | OUTPATIENT
Start: 2020-05-13 | End: 2020-05-14 | Stop reason: HOSPADM

## 2020-05-13 RX ORDER — NICOTINE POLACRILEX 4 MG
15 LOZENGE BUCCAL PRN
Status: DISCONTINUED | OUTPATIENT
Start: 2020-05-13 | End: 2020-05-14 | Stop reason: HOSPADM

## 2020-05-13 RX ORDER — DOXYCYCLINE HYCLATE 100 MG
100 TABLET ORAL EVERY 12 HOURS SCHEDULED
Status: DISCONTINUED | OUTPATIENT
Start: 2020-05-13 | End: 2020-05-14 | Stop reason: HOSPADM

## 2020-05-13 RX ORDER — DIPHENHYDRAMINE HYDROCHLORIDE 50 MG/ML
25 INJECTION INTRAMUSCULAR; INTRAVENOUS ONCE
Status: COMPLETED | OUTPATIENT
Start: 2020-05-13 | End: 2020-05-13

## 2020-05-13 RX ORDER — DEXTROSE MONOHYDRATE 50 MG/ML
100 INJECTION, SOLUTION INTRAVENOUS PRN
Status: DISCONTINUED | OUTPATIENT
Start: 2020-05-13 | End: 2020-05-14 | Stop reason: HOSPADM

## 2020-05-13 RX ADMIN — GABAPENTIN 300 MG: 300 CAPSULE ORAL at 15:16

## 2020-05-13 RX ADMIN — GABAPENTIN 300 MG: 300 CAPSULE ORAL at 20:43

## 2020-05-13 RX ADMIN — HYDROCODONE BITARTRATE AND ACETAMINOPHEN 2 TABLET: 5; 325 TABLET ORAL at 12:36

## 2020-05-13 RX ADMIN — DIPHENHYDRAMINE HYDROCHLORIDE 50 MG: 50 INJECTION, SOLUTION INTRAMUSCULAR; INTRAVENOUS at 05:43

## 2020-05-13 RX ADMIN — LISINOPRIL 5 MG: 5 TABLET ORAL at 09:29

## 2020-05-13 RX ADMIN — SUCRALFATE 1 G: 1 TABLET ORAL at 20:43

## 2020-05-13 RX ADMIN — DOXYCYCLINE HYCLATE 100 MG: 100 TABLET, COATED ORAL at 20:43

## 2020-05-13 RX ADMIN — DICYCLOMINE HYDROCHLORIDE 10 MG: 10 CAPSULE ORAL at 12:36

## 2020-05-13 RX ADMIN — MUPIROCIN: 20 OINTMENT TOPICAL at 09:29

## 2020-05-13 RX ADMIN — SUCRALFATE 1 G: 1 TABLET ORAL at 05:23

## 2020-05-13 RX ADMIN — SUCRALFATE 1 G: 1 TABLET ORAL at 17:06

## 2020-05-13 RX ADMIN — INSULIN GLARGINE 85 UNITS: 100 INJECTION, SOLUTION SUBCUTANEOUS at 09:29

## 2020-05-13 RX ADMIN — PANTOPRAZOLE SODIUM 40 MG: 40 TABLET, DELAYED RELEASE ORAL at 05:23

## 2020-05-13 RX ADMIN — LEVOTHYROXINE SODIUM 50 MCG: 50 TABLET ORAL at 05:23

## 2020-05-13 RX ADMIN — INSULIN LISPRO 25 UNITS: 100 INJECTION, SOLUTION INTRAVENOUS; SUBCUTANEOUS at 17:03

## 2020-05-13 RX ADMIN — CEPHALEXIN 500 MG: 500 CAPSULE ORAL at 15:58

## 2020-05-13 RX ADMIN — METOCLOPRAMIDE 10 MG: 10 TABLET ORAL at 09:29

## 2020-05-13 RX ADMIN — SUCRALFATE 1 G: 1 TABLET ORAL at 11:47

## 2020-05-13 RX ADMIN — SODIUM CHLORIDE, PRESERVATIVE FREE 10 ML: 5 INJECTION INTRAVENOUS at 20:57

## 2020-05-13 RX ADMIN — METOCLOPRAMIDE 10 MG: 10 TABLET ORAL at 20:43

## 2020-05-13 RX ADMIN — INSULIN LISPRO 25 UNITS: 100 INJECTION, SOLUTION INTRAVENOUS; SUBCUTANEOUS at 12:37

## 2020-05-13 RX ADMIN — HYDROCODONE BITARTRATE AND ACETAMINOPHEN 2 TABLET: 5; 325 TABLET ORAL at 05:23

## 2020-05-13 RX ADMIN — METOCLOPRAMIDE 10 MG: 10 TABLET ORAL at 17:07

## 2020-05-13 RX ADMIN — ENOXAPARIN SODIUM 40 MG: 40 INJECTION SUBCUTANEOUS at 15:16

## 2020-05-13 RX ADMIN — INSULIN GLARGINE 85 UNITS: 100 INJECTION, SOLUTION SUBCUTANEOUS at 20:44

## 2020-05-13 RX ADMIN — SODIUM CHLORIDE, PRESERVATIVE FREE 10 ML: 5 INJECTION INTRAVENOUS at 09:29

## 2020-05-13 RX ADMIN — DICYCLOMINE HYDROCHLORIDE 10 MG: 10 CAPSULE ORAL at 09:29

## 2020-05-13 RX ADMIN — DIPHENHYDRAMINE HYDROCHLORIDE 25 MG: 50 INJECTION, SOLUTION INTRAMUSCULAR; INTRAVENOUS at 00:16

## 2020-05-13 RX ADMIN — INSULIN LISPRO 25 UNITS: 100 INJECTION, SOLUTION INTRAVENOUS; SUBCUTANEOUS at 09:29

## 2020-05-13 RX ADMIN — VANCOMYCIN HYDROCHLORIDE 1500 MG: 500 INJECTION, POWDER, LYOPHILIZED, FOR SOLUTION INTRAVENOUS at 01:41

## 2020-05-13 RX ADMIN — PANTOPRAZOLE SODIUM 40 MG: 40 TABLET, DELAYED RELEASE ORAL at 15:16

## 2020-05-13 RX ADMIN — GABAPENTIN 300 MG: 300 CAPSULE ORAL at 09:29

## 2020-05-13 RX ADMIN — METOCLOPRAMIDE 10 MG: 10 TABLET ORAL at 12:36

## 2020-05-13 RX ADMIN — DICYCLOMINE HYDROCHLORIDE 10 MG: 10 CAPSULE ORAL at 20:43

## 2020-05-13 RX ADMIN — CEPHALEXIN 500 MG: 500 CAPSULE ORAL at 22:06

## 2020-05-13 RX ADMIN — DICYCLOMINE HYDROCHLORIDE 10 MG: 10 CAPSULE ORAL at 17:07

## 2020-05-13 RX ADMIN — SERTRALINE HYDROCHLORIDE 50 MG: 50 TABLET ORAL at 09:29

## 2020-05-13 RX ADMIN — TAMSULOSIN HYDROCHLORIDE 0.4 MG: 0.4 CAPSULE ORAL at 09:29

## 2020-05-13 ASSESSMENT — PAIN DESCRIPTION - LOCATION: LOCATION: GENERALIZED;FINGER (COMMENT WHICH ONE)

## 2020-05-13 ASSESSMENT — PAIN DESCRIPTION - FREQUENCY: FREQUENCY: CONTINUOUS

## 2020-05-13 ASSESSMENT — PAIN DESCRIPTION - ORIENTATION: ORIENTATION: RIGHT

## 2020-05-13 ASSESSMENT — PAIN DESCRIPTION - DESCRIPTORS: DESCRIPTORS: SHARP;THROBBING

## 2020-05-13 ASSESSMENT — PAIN DESCRIPTION - PAIN TYPE: TYPE: ACUTE PAIN;CHRONIC PAIN

## 2020-05-13 ASSESSMENT — PAIN DESCRIPTION - ONSET: ONSET: ON-GOING

## 2020-05-13 ASSESSMENT — PAIN SCALES - GENERAL
PAINLEVEL_OUTOF10: 8
PAINLEVEL_OUTOF10: 7
PAINLEVEL_OUTOF10: 9

## 2020-05-13 ASSESSMENT — PAIN DESCRIPTION - PROGRESSION: CLINICAL_PROGRESSION: NOT CHANGED

## 2020-05-13 ASSESSMENT — PAIN - FUNCTIONAL ASSESSMENT: PAIN_FUNCTIONAL_ASSESSMENT: ACTIVITIES ARE NOT PREVENTED

## 2020-05-13 NOTE — PROGRESS NOTES
Progress note: Infectious diseases    Patient - Alejandrina Womackal,  Age - 44 y.o.    - 1970      Room Number - 0H-33/676-L   MRN -  [id number]   Acct # - [de-identified]  Date of Admission -  2020  9:35 AM    SUBJECTIVE:   He developed maculopapular rash with flushing following the vancomycin. He still has persistent rash  Right hand less swollen and red  OBJECTIVE   VITALS    height is 5' 5.98\" (1.676 m) and weight is 235 lb (106.6 kg). His oral temperature is 98.2 °F (36.8 °C). His blood pressure is 137/77 and his pulse is 111. His respiration is 18 and oxygen saturation is 98%.        Wt Readings from Last 3 Encounters:   20 235 lb (106.6 kg)   20 235 lb (106.6 kg)   20 237 lb (107.5 kg)       I/O (24 Hours)    Intake/Output Summary (Last 24 hours) at 2020 1422  Last data filed at 2020 1333  Gross per 24 hour   Intake 600 ml   Output 525 ml   Net 75 ml       General Appearance  Awake, alert, oriented,  not  In acute distress  HEENT - normocephalic, atraumatic, pink conjunctiva,  anicteric sclera  Neck - Supple, no mass  Lungs -  Bilateral good air entry, no rhonchi, no wheeze  Cardiovascular - Heart sounds are normal.     Abdomen - soft, not distended, nontender,   Neurologic -oriented  Skin - diffuse maculopapular rash  Extremities - right above knee ampuation    MEDICATIONS:      insulin lispro  0-18 Units Subcutaneous TID WC    insulin lispro  0-9 Units Subcutaneous Nightly    dicyclomine  10 mg Oral 4x Daily    gabapentin  300 mg Oral TID    insulin lispro  25 Units Subcutaneous TID WC    levothyroxine  50 mcg Oral Daily    metoclopramide  10 mg Oral 4x Daily    pantoprazole  40 mg Oral BID AC    sertraline  50 mg Oral Daily    sucralfate  1 g Oral 4x Daily AC & HS    tamsulosin  0.4 mg Oral Daily    lisinopril  5 mg Oral Daily    sodium chloride flush  10 mL Intravenous 2 times per day    enoxaparin  40 mg Subcutaneous Q24H    insulin glargine  85 Units Subcutaneous BID    mupirocin   Topical Daily      dextrose       glucose, dextrose, glucagon (rDNA), dextrose, ondansetron, sodium chloride flush, acetaminophen **OR** acetaminophen, polyethylene glycol, promethazine **OR** ondansetron, HYDROcodone 5 mg - acetaminophen      LABS:     CBC:   Recent Labs     05/12/20  1023 05/13/20  0408   WBC 12.7* 9.0   HGB 15.3 13.9*    206     BMP:    Recent Labs     05/12/20  1023 05/13/20  0408   * 127*   K 3.7 3.8   CL 93* 92*   CO2 21* 22*   BUN 14 21   CREATININE 0.8 0.9   GLUCOSE 263* 334*     Calcium:  Recent Labs     05/13/20  0408   CALCIUM 8.7     Ionized Calcium:No results for input(s): IONCA in the last 72 hours. Magnesium:No results for input(s): MG in the last 72 hours. Phosphorus:No results for input(s): PHOS in the last 72 hours. BNP:No results for input(s): BNP in the last 72 hours. Glucose:  Recent Labs     05/12/20 2008 05/13/20  0758 05/13/20  1231   POCGLU 341* 274* 291*     HgbA1C: No results for input(s): LABA1C in the last 72 hours. INR: No results for input(s): INR in the last 72 hours. Hepatic:   Recent Labs     05/12/20  1023   ALKPHOS 79   ALT 32   AST 39   PROT 7.9   BILITOT 0.5   BILIDIR <0.2   LABALBU 3.9     Amylase and Lipase:No results for input(s): LACTA, AMYLASE in the last 72 hours. Lactic Acid: No results for input(s): LACTA in the last 72 hours. Troponin: No results for input(s): CKTOTAL, CKMB, TROPONINI in the last 72 hours. BNP: No results for input(s): BNP in the last 72 hours.     CULTURES:   UA:   Recent Labs     05/12/20  1915   PHUR 5.0   COLORU YELLOW   PROTEINU NEGATIVE   BLOODU NEGATIVE   NITRU NEGATIVE   GLUCOSEU 500*   BILIRUBINUR NEGATIVE   UROBILINOGEN 0.2   KETUA NEGATIVE     Micro:   Lab Results   Component Value Date    BC No growth-preliminary  05/12/2020         IMAGING:         Problem list of patient:     Patient Active Problem List   Diagnosis Code    Cellulitis L03.90    Status post below knee am[facility] right lower [facility] Z89.511    Gait disturbance R26.9    Sebaceous cyst L72.3    Uncontrolled type 2 diabetes mellitus with hyperglycemia, with long-term current use of insulin ([facility]) E11.65, Z79.4    Severe bipolar II disorder, recent episode major depressive, remission ([facility]) F31.81    Bipolar 1 disorder ([facility]) F31.9    Leukocytosis D72.829    Lactic acidosis E87.2    Hyponatremia E87.1    Normocytic anemia D64.9    Obesity (BMI 30-39. 9) E66.9    History of noncompliance with medical treatment Z91.19    Essential hypertension I10    Tobacco dependence F17.200    Gastroesophageal reflux disease without esophagitis K21.9    History of marijuana use Z87.898    Physical debility R53.81    Anemia D64.9    Electrolyte imbalance E87.8    Type 2 diabetes mellitus with hyperglycemia, with long-term current use of insulin ([facility]) E11.65, Z79.4    Weakness R53.1    Infection of above knee amputation stump of right leg ([facility]) T87.43    Above knee amputation of right lower extremity ([facility]) F13.309D    Sepsis ([facility]) A41.9    Vitamin D deficiency E55.9    COPD exacerbation ([facility]) J44.1    Influenza B J10.1    Depression, recurrent ([facility]) F33.9    Major depressive disorder, recurrent ([facility]) F33.9    GI bleed K92.2    Elevated lipase R74.8    Pressure ulcer of left calf, unstageable ([facility]) L89.890    Other psychoactive substance abuse, uncomplicated ([facility]) W07.05    Calculus of gallbladder without cholecystitis without obstruction K80.20    Right upper quadrant abdominal pain R10.11    Cellulitis of hand L03.119    Pedal edema R60.0    MURALI (obstructive sleep apnea) G47.33         ASSESSMENT/PLAN     Right hand cellulites: will place him on oral  doxycline and keflex  Discharge plan at am  Stop vancomycin: add to allergy    Hawa Pepe MD, FACP 5/13/2020 2:22 PM

## 2020-05-13 NOTE — PLAN OF CARE
Nutrition Problem: Increased nutrient needs  Intervention: Food and/or Nutrient Delivery: Continue current diet, Start ONS  Nutritional Goals: Patient will consume 75% or more of meals to aid in healing during LOS.     Problem: Nutrition  Goal: Optimal nutrition therapy  Outcome: Ongoing

## 2020-05-13 NOTE — PROGRESS NOTES
sodium chloride flush  10 mL Intravenous 2 times per day    enoxaparin  40 mg Subcutaneous Q24H    vancomycin (VANCOCIN) intermittent dosing (placeholder)   Other RX Placeholder    insulin glargine  85 Units Subcutaneous BID    mupirocin   Topical Daily     PRN Meds: ondansetron, sodium chloride flush, acetaminophen **OR** acetaminophen, polyethylene glycol, promethazine **OR** ondansetron, HYDROcodone 5 mg - acetaminophen      Intake/Output Summary (Last 24 hours) at 5/13/2020 0803  Last data filed at 5/12/2020 1857  Gross per 24 hour   Intake --   Output 525 ml   Net -525 ml       Diet:  DIET GENERAL; Carb Control: 4 carb choices (60 gms)/meal; No Added Salt (3-4 GM); Daily Fluid Restriction: 1800 ml    Exam:  /67   Pulse 98   Temp 97.4 °F (36.3 °C) (Oral)   Resp 18   Ht 5' 5.98\" (1.676 m)   Wt 235 lb (106.6 kg)   SpO2 95%   BMI 37.95 kg/m²     General appearance: No apparent distress, appears stated age and cooperative. HEENT: Pupils equal, round, and reactive to light. Conjunctivae/corneas clear. Neck: Supple, with full range of motion. No jugular venous distention. Trachea midline. Respiratory:  Normal respiratory effort. Clear to auscultation, bilaterally without Rales/Wheezes/Rhonchi. Cardiovascular: Regular rate and rhythm with normal S1/S2 without murmurs, rubs or gallops. Abdomen: Soft, non-tender, non-distended with normal bowel sounds. Musculoskeletal: passive and active ROM x 4 extremities. Skin: Skin color, texture, turgor normal.  Rash plaques to trunk, ext and posterior scalp. 5th digit right hand erythema with sore. LLE erythema and healing sores. Neurologic:  Neurovascularly intact without any focal sensory/motor deficits.  Cranial nerves: II-XII intact, grossly non-focal.  Psychiatric: Alert and oriented, thought content appropriate  Capillary Refill: Brisk,< 3 seconds   Peripheral Pulses: +2 palpable, equal bilaterally       Labs:   Recent Labs     05/12/20  1023 MARILYN Jade CNP on 5/13/2020 at 8:03 AM

## 2020-05-13 NOTE — PROGRESS NOTES
Patient does not have home cpap machine here and does not want to wear our hospital machine tonight.

## 2020-05-14 VITALS
DIASTOLIC BLOOD PRESSURE: 79 MMHG | BODY MASS INDEX: 38.89 KG/M2 | TEMPERATURE: 98.3 F | HEIGHT: 66 IN | SYSTOLIC BLOOD PRESSURE: 131 MMHG | WEIGHT: 242 LBS | HEART RATE: 98 BPM | RESPIRATION RATE: 16 BRPM | OXYGEN SATURATION: 97 %

## 2020-05-14 LAB
ANION GAP SERPL CALCULATED.3IONS-SCNC: 12 MEQ/L (ref 8–16)
BASOPHILS # BLD: 0.4 %
BASOPHILS ABSOLUTE: 0 THOU/MM3 (ref 0–0.1)
BUN BLDV-MCNC: 32 MG/DL (ref 7–22)
CALCIUM SERPL-MCNC: 8.7 MG/DL (ref 8.5–10.5)
CHLORIDE BLD-SCNC: 101 MEQ/L (ref 98–111)
CO2: 20 MEQ/L (ref 23–33)
CREAT SERPL-MCNC: 1.1 MG/DL (ref 0.4–1.2)
EOSINOPHIL # BLD: 6.8 %
EOSINOPHILS ABSOLUTE: 0.6 THOU/MM3 (ref 0–0.4)
ERYTHROCYTE [DISTWIDTH] IN BLOOD BY AUTOMATED COUNT: 14.3 % (ref 11.5–14.5)
ERYTHROCYTE [DISTWIDTH] IN BLOOD BY AUTOMATED COUNT: 45.8 FL (ref 35–45)
GFR SERPL CREATININE-BSD FRML MDRD: 70 ML/MIN/1.73M2
GLUCOSE BLD-MCNC: 208 MG/DL (ref 70–108)
GLUCOSE BLD-MCNC: 217 MG/DL (ref 70–108)
HCT VFR BLD CALC: 38.9 % (ref 42–52)
HEMOGLOBIN: 13.3 GM/DL (ref 14–18)
IMMATURE GRANS (ABS): 0.13 THOU/MM3 (ref 0–0.07)
IMMATURE GRANULOCYTES: 1.5 %
LYMPHOCYTES # BLD: 27.1 %
LYMPHOCYTES ABSOLUTE: 2.3 THOU/MM3 (ref 1–4.8)
MCH RBC QN AUTO: 30.1 PG (ref 26–33)
MCHC RBC AUTO-ENTMCNC: 34.2 GM/DL (ref 32.2–35.5)
MCV RBC AUTO: 88 FL (ref 80–94)
MONOCYTES # BLD: 11 %
MONOCYTES ABSOLUTE: 0.9 THOU/MM3 (ref 0.4–1.3)
NUCLEATED RED BLOOD CELLS: 0 /100 WBC
PLATELET # BLD: 176 THOU/MM3 (ref 130–400)
PMV BLD AUTO: 9.4 FL (ref 9.4–12.4)
POTASSIUM SERPL-SCNC: 4.1 MEQ/L (ref 3.5–5.2)
RBC # BLD: 4.42 MILL/MM3 (ref 4.7–6.1)
SEG NEUTROPHILS: 53.2 %
SEGMENTED NEUTROPHILS ABSOLUTE COUNT: 4.5 THOU/MM3 (ref 1.8–7.7)
SODIUM BLD-SCNC: 133 MEQ/L (ref 135–145)
T4 FREE: 1.1 NG/DL (ref 0.93–1.76)
TSH SERPL DL<=0.05 MIU/L-ACNC: 4.53 UIU/ML (ref 0.4–4.2)
WBC # BLD: 8.5 THOU/MM3 (ref 4.8–10.8)

## 2020-05-14 PROCEDURE — 99239 HOSP IP/OBS DSCHRG MGMT >30: CPT | Performed by: NURSE PRACTITIONER

## 2020-05-14 PROCEDURE — 84443 ASSAY THYROID STIM HORMONE: CPT

## 2020-05-14 PROCEDURE — 6370000000 HC RX 637 (ALT 250 FOR IP): Performed by: INTERNAL MEDICINE

## 2020-05-14 PROCEDURE — 85025 COMPLETE CBC W/AUTO DIFF WBC: CPT

## 2020-05-14 PROCEDURE — 84439 ASSAY OF FREE THYROXINE: CPT

## 2020-05-14 PROCEDURE — G0378 HOSPITAL OBSERVATION PER HR: HCPCS

## 2020-05-14 PROCEDURE — 36415 COLL VENOUS BLD VENIPUNCTURE: CPT

## 2020-05-14 PROCEDURE — 1200000000 HC SEMI PRIVATE

## 2020-05-14 PROCEDURE — 82948 REAGENT STRIP/BLOOD GLUCOSE: CPT

## 2020-05-14 PROCEDURE — 80048 BASIC METABOLIC PNL TOTAL CA: CPT

## 2020-05-14 RX ORDER — CEPHALEXIN 500 MG/1
500 CAPSULE ORAL EVERY 8 HOURS SCHEDULED
Qty: 21 CAPSULE | Refills: 0 | Status: SHIPPED | OUTPATIENT
Start: 2020-05-14 | End: 2020-06-18 | Stop reason: ALTCHOICE

## 2020-05-14 RX ORDER — DOXYCYCLINE HYCLATE 100 MG
100 TABLET ORAL EVERY 12 HOURS SCHEDULED
Qty: 14 TABLET | Refills: 0 | Status: SHIPPED | OUTPATIENT
Start: 2020-05-14 | End: 2020-05-21

## 2020-05-14 RX ORDER — LISINOPRIL 5 MG/1
5 TABLET ORAL DAILY
Qty: 30 TABLET | Refills: 0 | Status: SHIPPED | OUTPATIENT
Start: 2020-05-14 | End: 2021-01-22 | Stop reason: SDUPTHER

## 2020-05-14 RX ADMIN — LEVOTHYROXINE SODIUM 50 MCG: 50 TABLET ORAL at 06:12

## 2020-05-14 RX ADMIN — INSULIN LISPRO 25 UNITS: 100 INJECTION, SOLUTION INTRAVENOUS; SUBCUTANEOUS at 09:11

## 2020-05-14 RX ADMIN — DICYCLOMINE HYDROCHLORIDE 10 MG: 10 CAPSULE ORAL at 09:02

## 2020-05-14 RX ADMIN — CEPHALEXIN 500 MG: 500 CAPSULE ORAL at 06:12

## 2020-05-14 RX ADMIN — LISINOPRIL 5 MG: 5 TABLET ORAL at 09:02

## 2020-05-14 RX ADMIN — TAMSULOSIN HYDROCHLORIDE 0.4 MG: 0.4 CAPSULE ORAL at 09:02

## 2020-05-14 RX ADMIN — MUPIROCIN: 20 OINTMENT TOPICAL at 09:03

## 2020-05-14 RX ADMIN — SUCRALFATE 1 G: 1 TABLET ORAL at 09:01

## 2020-05-14 RX ADMIN — INSULIN GLARGINE 85 UNITS: 100 INJECTION, SOLUTION SUBCUTANEOUS at 09:07

## 2020-05-14 RX ADMIN — GABAPENTIN 300 MG: 300 CAPSULE ORAL at 09:02

## 2020-05-14 RX ADMIN — METOCLOPRAMIDE 10 MG: 10 TABLET ORAL at 09:01

## 2020-05-14 RX ADMIN — SERTRALINE HYDROCHLORIDE 50 MG: 50 TABLET ORAL at 09:01

## 2020-05-14 RX ADMIN — DOXYCYCLINE HYCLATE 100 MG: 100 TABLET, COATED ORAL at 09:03

## 2020-05-14 RX ADMIN — PANTOPRAZOLE SODIUM 40 MG: 40 TABLET, DELAYED RELEASE ORAL at 06:12

## 2020-05-14 RX ADMIN — SUCRALFATE 1 G: 1 TABLET ORAL at 06:12

## 2020-05-14 NOTE — PROGRESS NOTES
CLINICAL PHARMACY NOTE: MEDS TO [address] Select Patient?: Yes  Total # of Prescriptions Filled: 4   The following medications were delivered to the patient:  Doxycycline Hyclate 100mg  Cephalexin 500mg  Mupirocin Ointment 2%  Lisinopril 5mg  Total # of Interventions Completed: 2  Time Spent (min): 30    Additional Documentation:

## 2020-05-14 NOTE — CARE COORDINATION
5/14/20, 9:27 AM EDT    Patient goals/plan/ treatment preferences discussed by  and . Patient goals/plan/ treatment preferences reviewed with patient/ family. Patient/ family verbalize understanding of discharge plan and are in agreement with goal/plan/treatment preferences. Understanding was demonstrated using the teach back method. AVS provided by RN at time of discharge, which includes all necessary medical information pertaining to the patients current course of illness, treatment, post-discharge goals of care, and treatment preferences.             Home today with family on oral antibiotics

## 2020-05-14 NOTE — PROGRESS NOTES
chloride flush  10 mL Intravenous 2 times per day    enoxaparin  40 mg Subcutaneous Q24H    insulin glargine  85 Units Subcutaneous BID    mupirocin   Topical Daily      dextrose       glucose, dextrose, glucagon (rDNA), dextrose, ondansetron, sodium chloride flush, acetaminophen **OR** acetaminophen, polyethylene glycol, promethazine **OR** ondansetron, HYDROcodone 5 mg - acetaminophen      LABS:     CBC:   Recent Labs     05/12/20  1023 05/13/20  0408 05/14/20  0457   WBC 12.7* 9.0 8.5   HGB 15.3 13.9* 13.3*    206 176     BMP:    Recent Labs     05/12/20  1023 05/13/20  0408 05/14/20  0457   * 127* 133*   K 3.7 3.8 4.1   CL 93* 92* 101   CO2 21* 22* 20*   BUN 14 21 32*   CREATININE 0.8 0.9 1.1   GLUCOSE 263* 334* 217*     Calcium:  Recent Labs     05/14/20  0457   CALCIUM 8.7     Ionized Calcium:No results for input(s): IONCA in the last 72 hours. Magnesium:No results for input(s): MG in the last 72 hours. Phosphorus:No results for input(s): PHOS in the last 72 hours. BNP:No results for input(s): BNP in the last 72 hours. Glucose:  Recent Labs     05/13/20  1651 05/13/20  1954 05/14/20  0743   POCGLU 242* 270* 208*     HgbA1C: No results for input(s): LABA1C in the last 72 hours. INR: No results for input(s): INR in the last 72 hours.   Hepatic:   Recent Labs     05/12/20  1023   ALKPHOS 79   ALT 32   AST 39   PROT 7.9   BILITOT 0.5   BILIDIR <0.2   LABALBU 3.9        CULTURES:   UA:   Recent Labs     05/12/20  1915   PHUR 5.0   COLORU YELLOW   PROTEINU NEGATIVE   BLOODU NEGATIVE   NITRU NEGATIVE   GLUCOSEU 500*   BILIRUBINUR NEGATIVE   UROBILINOGEN 0.2   KETUA NEGATIVE     Micro:   Lab Results   Component Value Date    BC No growth-preliminary  05/12/2020            Problem list of patient:     Patient Active Problem List   Diagnosis Code    Cellulitis L03.90    Status post below knee amputation of right lower extremity (HCC) Z89.511    Gait disturbance R26.9    Sebaceous cyst L72.3    Uncontrolled type 2 diabetes mellitus with hyperglycemia, with long-term current use of insulin ([facility]) E11.65, Z79.4    Severe bipolar II disorder, recent episode major depressive, remission ([facility]) F31.81    Bipolar 1 disorder ([facility]) F31.9    Leukocytosis D72.829    Lactic acidosis E87.2    Hyponatremia E87.1    Normocytic anemia D64.9    Obesity (BMI 30-39. 9) E66.9    History of noncompliance with medical treatment Z91.19    Essential hypertension I10    Tobacco dependence F17.200    Gastroesophageal reflux disease without esophagitis K21.9    History of marijuana use Z87.898    Physical debility R53.81    Anemia D64.9    Electrolyte imbalance E87.8    Type 2 diabetes mellitus with hyperglycemia, with long-term current use of insulin ([facility]) E11.65, Z79.4    Weakness R53.1    Infection of above knee amputation stump of right leg ([facility]) T87.43    Above knee amputation of right lower extremity ([facility]) I54.646K    Sepsis ([facility]) A41.9    Vitamin D deficiency E55.9    COPD exacerbation ([facility]) J44.1    Influenza B J10.1    Depression, recurrent ([facility]) F33.9    Major depressive disorder, recurrent ([facility]) F33.9    GI bleed K92.2    Elevated lipase R74.8    Pressure ulcer of left calf, unstageable ([facility]) L89.890    Other psychoactive substance abuse, uncomplicated ([facility]) P09.58    Calculus of gallbladder without cholecystitis without obstruction K80.20    Right upper quadrant abdominal pain R10.11    Cellulitis of hand L03.119    Pedal edema R60.0    MURALI (obstructive sleep apnea) G47.33         ASSESSMENT/PLAN     Right hand cellulites:continue oral doxy and keflex for one wk. He has antibiotics at home. Ok with discharge plan.   Yin Marlow MD, FACP 5/14/2020 9:57 AM

## 2020-05-15 ENCOUNTER — CARE COORDINATION (OUTPATIENT)
Dept: CASE MANAGEMENT | Age: 50
End: 2020-05-15

## 2020-05-16 ENCOUNTER — CARE COORDINATION (OUTPATIENT)
Dept: CASE MANAGEMENT | Age: 50
End: 2020-05-16

## 2020-05-16 LAB
AEROBIC CULTURE: ABNORMAL
ANAEROBIC CULTURE: ABNORMAL
GRAM STAIN RESULT: ABNORMAL
ORGANISM: ABNORMAL
ORGANISM: ABNORMAL

## 2020-05-17 ENCOUNTER — APPOINTMENT (OUTPATIENT)
Dept: GENERAL RADIOLOGY | Age: 50
End: 2020-05-17
Payer: MEDICARE

## 2020-05-17 ENCOUNTER — HOSPITAL ENCOUNTER (EMERGENCY)
Age: 50
Discharge: HOME | End: 2020-05-18
Payer: MEDICARE

## 2020-05-17 LAB
ALBUMIN SERPL-MCNC: 3.8 G/DL (ref 3.5–5.1)
ALP BLD-CCNC: 72 U/L (ref 38–126)
ALT SERPL-CCNC: 28 U/L (ref 11–66)
ANION GAP SERPL CALCULATED.3IONS-SCNC: 14 MEQ/L (ref 8–16)
AST SERPL-CCNC: 25 U/L (ref 5–40)
BASOPHILS # BLD: 0.6 %
BASOPHILS ABSOLUTE: 0.1 THOU/MM3 (ref 0–0.1)
BILIRUB SERPL-MCNC: 0.3 MG/DL (ref 0.3–1.2)
BILIRUBIN DIRECT: < 0.2 MG/DL (ref 0–0.3)
BILIRUBIN URINE: NEGATIVE
BLOOD, URINE: NEGATIVE
BUN BLDV-MCNC: 13 MG/DL (ref 7–22)
C-REACTIVE PROTEIN: 1.12 MG/DL (ref 0–1)
CALCIUM SERPL-MCNC: 9.7 MG/DL (ref 8.5–10.5)
CHARACTER, URINE: CLEAR
CHLORIDE BLD-SCNC: 89 MEQ/L (ref 98–111)
CO2: 25 MEQ/L (ref 23–33)
COLOR: YELLOW
CREAT SERPL-MCNC: 0.8 MG/DL (ref 0.4–1.2)
EKG ATRIAL RATE: 93 BPM
EKG P AXIS: 40 DEGREES
EKG P-R INTERVAL: 166 MS
EKG Q-T INTERVAL: 356 MS
EKG QRS DURATION: 80 MS
EKG QTC CALCULATION (BAZETT): 442 MS
EKG R AXIS: 15 DEGREES
EKG T AXIS: 49 DEGREES
EKG VENTRICULAR RATE: 93 BPM
EOSINOPHIL # BLD: 3.8 %
EOSINOPHILS ABSOLUTE: 0.5 THOU/MM3 (ref 0–0.4)
ERYTHROCYTE [DISTWIDTH] IN BLOOD BY AUTOMATED COUNT: 14.2 % (ref 11.5–14.5)
ERYTHROCYTE [DISTWIDTH] IN BLOOD BY AUTOMATED COUNT: 43.9 FL (ref 35–45)
GFR SERPL CREATININE-BSD FRML MDRD: > 90 ML/MIN/1.73M2
GLUCOSE BLD-MCNC: 298 MG/DL (ref 70–108)
GLUCOSE BLD-MCNC: 397 MG/DL (ref 70–108)
GLUCOSE BLD-MCNC: 407 MG/DL (ref 70–108)
GLUCOSE URINE: >= 1000 MG/DL
HCT VFR BLD CALC: 42.5 % (ref 42–52)
HEMOGLOBIN: 14.6 GM/DL (ref 14–18)
IMMATURE GRANS (ABS): 0.13 THOU/MM3 (ref 0–0.07)
IMMATURE GRANULOCYTES: 1 %
KETONES, URINE: NEGATIVE
LACTIC ACID: 3.7 MMOL/L (ref 0.5–2.2)
LD: 211 U/L (ref 100–190)
LEUKOCYTE ESTERASE, URINE: NEGATIVE
LIPASE: 29 U/L (ref 5.6–51.3)
LYMPHOCYTES # BLD: 25.3 %
LYMPHOCYTES ABSOLUTE: 3.3 THOU/MM3 (ref 1–4.8)
MCH RBC QN AUTO: 29.6 PG (ref 26–33)
MCHC RBC AUTO-ENTMCNC: 34.4 GM/DL (ref 32.2–35.5)
MCV RBC AUTO: 86.2 FL (ref 80–94)
MONOCYTES # BLD: 8 %
MONOCYTES ABSOLUTE: 1 THOU/MM3 (ref 0.4–1.3)
NITRITE, URINE: NEGATIVE
NUCLEATED RED BLOOD CELLS: 0 /100 WBC
OSMOLALITY CALCULATION: 274.3 MOSMOL/KG (ref 275–300)
PH UA: 5.5 (ref 5–9)
PLATELET # BLD: 222 THOU/MM3 (ref 130–400)
PMV BLD AUTO: 9.4 FL (ref 9.4–12.4)
POTASSIUM SERPL-SCNC: 4.2 MEQ/L (ref 3.5–5.2)
PROCALCITONIN: 0.08 NG/ML (ref 0.01–0.09)
PROTEIN UA: NEGATIVE
RBC # BLD: 4.93 MILL/MM3 (ref 4.7–6.1)
SEG NEUTROPHILS: 61.3 %
SEGMENTED NEUTROPHILS ABSOLUTE COUNT: 8 THOU/MM3 (ref 1.8–7.7)
SODIUM BLD-SCNC: 128 MEQ/L (ref 135–145)
SPECIFIC GRAVITY, URINE: > 1.03 (ref 1–1.03)
TOTAL PROTEIN: 7.6 G/DL (ref 6.1–8)
TROPONIN T: < 0.01 NG/ML
UROBILINOGEN, URINE: 0.2 EU/DL (ref 0–1)
WBC # BLD: 13 THOU/MM3 (ref 4.8–10.8)

## 2020-05-17 PROCEDURE — 96361 HYDRATE IV INFUSION ADD-ON: CPT

## 2020-05-17 PROCEDURE — 84484 ASSAY OF TROPONIN QUANT: CPT

## 2020-05-17 PROCEDURE — 83615 LACTATE (LD) (LDH) ENZYME: CPT

## 2020-05-17 PROCEDURE — 99285 EMERGENCY DEPT VISIT HI MDM: CPT

## 2020-05-17 PROCEDURE — 86140 C-REACTIVE PROTEIN: CPT

## 2020-05-17 PROCEDURE — 83605 ASSAY OF LACTIC ACID: CPT

## 2020-05-17 PROCEDURE — 71045 X-RAY EXAM CHEST 1 VIEW: CPT

## 2020-05-17 PROCEDURE — 83690 ASSAY OF LIPASE: CPT

## 2020-05-17 PROCEDURE — 6370000000 HC RX 637 (ALT 250 FOR IP): Performed by: NURSE PRACTITIONER

## 2020-05-17 PROCEDURE — 36415 COLL VENOUS BLD VENIPUNCTURE: CPT

## 2020-05-17 PROCEDURE — 82948 REAGENT STRIP/BLOOD GLUCOSE: CPT

## 2020-05-17 PROCEDURE — 80053 COMPREHEN METABOLIC PANEL: CPT

## 2020-05-17 PROCEDURE — 84145 PROCALCITONIN (PCT): CPT

## 2020-05-17 PROCEDURE — 82248 BILIRUBIN DIRECT: CPT

## 2020-05-17 PROCEDURE — 81003 URINALYSIS AUTO W/O SCOPE: CPT

## 2020-05-17 PROCEDURE — 93005 ELECTROCARDIOGRAM TRACING: CPT | Performed by: NURSE PRACTITIONER

## 2020-05-17 PROCEDURE — 2580000003 HC RX 258: Performed by: NURSE PRACTITIONER

## 2020-05-17 PROCEDURE — 85025 COMPLETE CBC W/AUTO DIFF WBC: CPT

## 2020-05-17 PROCEDURE — 96374 THER/PROPH/DIAG INJ IV PUSH: CPT

## 2020-05-17 RX ORDER — 0.9 % SODIUM CHLORIDE 0.9 %
1000 INTRAVENOUS SOLUTION INTRAVENOUS ONCE
Status: COMPLETED | OUTPATIENT
Start: 2020-05-17 | End: 2020-05-18

## 2020-05-17 RX ADMIN — Medication 10 UNITS: at 22:51

## 2020-05-17 RX ADMIN — SODIUM CHLORIDE 1000 ML: 9 INJECTION, SOLUTION INTRAVENOUS at 22:54

## 2020-05-17 ASSESSMENT — PAIN DESCRIPTION - LOCATION
LOCATION: CHEST

## 2020-05-17 ASSESSMENT — PAIN DESCRIPTION - PAIN TYPE
TYPE: ACUTE PAIN

## 2020-05-17 ASSESSMENT — PAIN SCALES - GENERAL
PAINLEVEL_OUTOF10: 6
PAINLEVEL_OUTOF10: 7
PAINLEVEL_OUTOF10: 7

## 2020-05-17 ASSESSMENT — PAIN DESCRIPTION - FREQUENCY: FREQUENCY: INTERMITTENT

## 2020-05-17 ASSESSMENT — PAIN DESCRIPTION - DESCRIPTORS: DESCRIPTORS: PRESSURE;TIGHTNESS

## 2020-05-18 ENCOUNTER — CARE COORDINATION (OUTPATIENT)
Dept: CASE MANAGEMENT | Age: 50
End: 2020-05-18

## 2020-05-18 VITALS
RESPIRATION RATE: 19 BRPM | DIASTOLIC BLOOD PRESSURE: 72 MMHG | TEMPERATURE: 98.5 F | OXYGEN SATURATION: 96 % | HEART RATE: 94 BPM | SYSTOLIC BLOOD PRESSURE: 122 MMHG | WEIGHT: 235 LBS | BODY MASS INDEX: 37.77 KG/M2 | HEIGHT: 66 IN

## 2020-05-18 LAB
BLOOD CULTURE, ROUTINE: NORMAL
BLOOD CULTURE, ROUTINE: NORMAL
LACTIC ACID: 3.2 MMOL/L (ref 0.5–2.2)
TROPONIN T: < 0.01 NG/ML

## 2020-05-18 PROCEDURE — 93010 ELECTROCARDIOGRAM REPORT: CPT | Performed by: INTERNAL MEDICINE

## 2020-05-18 ASSESSMENT — ENCOUNTER SYMPTOMS
CHEST TIGHTNESS: 0
BACK PAIN: 0
COUGH: 0
SHORTNESS OF BREATH: 1
NAUSEA: 0
RHINORRHEA: 0
ABDOMINAL PAIN: 0

## 2020-05-18 ASSESSMENT — HEART SCORE: ECG: 0

## 2020-05-18 NOTE — ED PROVIDER NOTES
[facility]  Pt Name: Nai Ibarra  MRN: [id number]  [place] 1970  Date of evaluation: 5/17/2020  Provider: MARILYN Phipps [id number]       Chief Complaint   Patient presents with    Chest Pain    Shortness of Breath    Hyperglycemia       Nurses Notes reviewed and I agree except as noted in the HPI. HISTORY OF PRESENT ILLNESS    Nai Ibarra is a 44 y.o. male whopresents to the emergency department from home with chest pain, SOB, left leg swelling and hyperglycemia. The patient was just admitted for these same complaints and discharged 3 days ago. (he also had a finger infection). Chest pain started today. He was told the left leg swelling is likely a medication reaction and they just changed his medications 3 days ago to account for it. Chest pain is intermittent. No increased by activity. He states that his blood sugar has been 300+ and he is taking his insulin.  (they also stopped his actos bc it is probably contributing to leg swelling). HPI was provided by the patient. Triage notes and Nursing notes were reviewed by myself. Any discrepancies are addressed above. REVIEW OF SYSTEMS     Review of Systems   Constitutional: Negative for chills, fatigue and fever. HENT: Negative for congestion, ear discharge, ear pain, postnasal drip and rhinorrhea. Respiratory: Positive for shortness of breath. Negative for cough and chest tightness. Cardiovascular: Positive for chest pain and leg swelling. Negative for palpitations. Gastrointestinal: Negative for abdominal pain and nausea. Genitourinary: Negative for difficulty urinating, dysuria, enuresis, flank pain and hematuria. Musculoskeletal: Negative for back pain and joint swelling. Skin: Negative for rash. Neurological: Negative for dizziness, light-headedness, numbness and headaches. Psychiatric/Behavioral: Negative for agitation, behavioral problems and confusion.

## 2020-05-18 NOTE — ED NOTES
Pt reassessed at this time. Pt resting comfortably on cot watching TV. Pt states chest pain 7/10 currently. Denies any needs. Vitals stable. Call light in reach.  Will continue to monitor      Caron Tipton RN  05/17/20 0578

## 2020-05-19 ENCOUNTER — CARE COORDINATION (OUTPATIENT)
Dept: CASE MANAGEMENT | Age: 50
End: 2020-05-19

## 2020-05-20 ENCOUNTER — CARE COORDINATION (OUTPATIENT)
Dept: CASE MANAGEMENT | Age: 50
End: 2020-05-20

## 2020-05-20 NOTE — CARE COORDINATION
[address] [facility] ED Follow Up Call    2020    Patient: Rod Garrett Patient : 1970   MRN: [id number]  Reason for Admission: chest pain, SOB   Discharge Date: 20        Second & final attempt to reach for the ER F/U call. Left message to call transition care nurse from [facility] - [facility] @ [phone].     [address] Transition Nurse  [phone]

## 2020-05-21 ENCOUNTER — HOSPITAL ENCOUNTER (OUTPATIENT)
Dept: WOUND CARE | Age: 50
Discharge: HOME | End: 2020-05-21
Payer: MEDICARE

## 2020-05-21 VITALS
WEIGHT: 235 LBS | OXYGEN SATURATION: 98 % | TEMPERATURE: 98.9 F | BODY MASS INDEX: 37.77 KG/M2 | HEIGHT: 66 IN | DIASTOLIC BLOOD PRESSURE: 70 MMHG | RESPIRATION RATE: 18 BRPM | HEART RATE: 94 BPM | SYSTOLIC BLOOD PRESSURE: 157 MMHG

## 2020-05-21 PROCEDURE — 99212 OFFICE O/P EST SF 10 MIN: CPT

## 2020-05-21 ASSESSMENT — PAIN DESCRIPTION - LOCATION: LOCATION: LEG;FINGER (COMMENT WHICH ONE)

## 2020-05-21 ASSESSMENT — PAIN DESCRIPTION - PAIN TYPE: TYPE: CHRONIC PAIN

## 2020-05-21 ASSESSMENT — PAIN SCALES - GENERAL: PAINLEVEL_OUTOF10: 8

## 2020-05-21 NOTE — PROGRESS NOTES
[address] [facility]          Progress Note and Procedure Note      [facility] RECORD NUMBER:  [id number]  AGE: 44 y.o. GENDER: male  : 1970  EPISODE DATE:  2020    Subjective:     SUBJECTIVE     Chief Complaint   Patient presents with    Wound Infection     Left finger wound           HISTORY OF PRESENT ILLNESS   He is here for follow up visit from hospital. He had infectin of the finger. He was hospitalized and discharged with oral antibiotic. He is diabetic. He developed right 5th finger redness and swelling following a boil. He is diabetic with neuropathy had hx of foot ulcer that required amputation of the right leg. He has a scabbed wound on the left middle finger. He has a dry skin, he has no fever or chills, no stiffness of the fingers. PAST MEDICAL HISTORY         Diagnosis Date    Bipolar 2 disorder [facility])     previously followed with Dr. Tiffanie Stearns and Renee Merida in [facility] BPH (benign prostatic hyperplasia)     Diabetes mellitus type 2, uncontrolled ([address]     HgbA1c on 2019 was 9.1.     Diabetic peripheral neuropathy (HCC)     Diabetic polyneuropathy ([address]     Diabetic ulcer of right foot associated with type 2 diabetes mellitus ([address] 12/10/2015    Essential hypertension     \"never been on b/p medication that I know of\"    GERD (gastroesophageal reflux disease)     Hammer toe of left foot     Heart murmur     denies any chest pain or palpitations    History of tobacco abuse     Hx of AKA (above knee amputation), right ([address] 2019    Dr. Noreen Pacheco edema, diabetic, bilateral ([address] 2018    Dr. Rees Party referred to retina specialist for 2nd opinion    Marijuana abuse in remission     Melena     MRSA (methicillin resistant staph aureus) culture positive     Onychomycosis     Other disorders of kidney and ureter in diseases classified elsewhere     Sleep apnea     no cpap    Thyroid disease     WD-Skin ulcer of mouth 4 times daily (before meals and nightly) 120 tablet 0    sertraline (ZOLOFT) 50 MG tablet Take 1 tablet by mouth daily 30 tablet 5    Lancets MISC Use to test blood sugars 4 times daily DX:E11.65 200 each 0    AMINO ACIDS COMPLEX PO Take 1 each by mouth daily Daily AA drink      blood glucose monitor kit and supplies Accu Chek Sheila meter. Use to test blood sugars DX:E11.65 1 kit 0    blood glucose monitor strips Accucheck Sheila Test Strips Test blood sugars 4 times daily DX:E11.65 400 strip 5    Insulin Syringe-Needle U-100 29G X 1/2\" 0.3 ML MISC 1 each by Does not apply route 4 times daily (after meals and at bedtime) 200 each 0     No current facility-administered medications on file prior to encounter. REVIEW OF SYSTEMS:     Constitutional: no fever, no night sweats, no fatigue. Head: no head ache , no head injury, no migranes. Eye: no blurring of vision, no double vision. Ears: no hearing difficulty, no tinnitus  Mouth/throat: no ulceration, dental caries , dysphagia  Lungs: no cough, no shortness of breath, no wheeze  CVS: no palpitation, no chest pain, no shortness of breath  GI: no abdominal pain, no nausea , no vomiting, no constipation  ISMAEL: no dysuria, frequency and urgency, no hematuria, no kidney stones  Musculoskeletal: as noted in HPI  Endocrine: no polyuria, polydipsia, no cold or heat intolerance  Hematology: no anemia, no easy brusing or bleeding, no hx of clotting disorder  Dermatology: no skin rash, no eczema, no pruritis. Psychiatry:hx of bipolar disorder. PHYSICAL EXAM      vitals were not taken for this visit. Wt Readings from Last 3 Encounters:   05/17/20 235 lb (106.6 kg)   05/14/20 242 lb (109.8 kg)   04/29/20 235 lb (106.6 kg)      General Appearance  Awake, alert, oriented,  not  In acute distress  HEENT - normocephalic, atraumatic, pink conjunctiva,  anicteric sclera  Neck - Supple, no mass.    Abdomen - soft, not distended, nontender, no organomegally. Neurologic -  Oriented. Skin - No bruising or bleeding. Extremities - right above knee amputation. Right hand 5th digit has healed, no redness or drainage. Left middle finger at the middle interphalangeal joints:         LABS      Lab Results   Component Value Date    BC No growth-preliminary No growth  05/12/2020       Assessment:     Right hand infection:it has improved. He will finish his oral antibiotics. Advised to use moisturizing cream due to dry skin  Follow up will be scheduled as needed. Patient Active Problem List   Diagnosis Code    Cellulitis L03.90    Status post below knee amputation of right lower extremity ([facility] Z89.511    Gait disturbance R26.9    Sebaceous cyst L72.3    Uncontrolled type 2 diabetes mellitus with hyperglycemia, with long-term current use of insulin ([facility]) E11.65, Z79.4    Severe bipolar II disorder, recent episode major depressive, remission ([facility]) F31.81    Bipolar 1 disorder ([facility]) F31.9    Leukocytosis D72.829    Lactic acidosis E87.2    Hyponatremia E87.1    Normocytic anemia D64.9    Obesity (BMI 30-39. 9) E66.9    History of noncompliance with medical treatment Z91.19    Essential hypertension I10    Tobacco dependence F17.200    Gastroesophageal reflux disease without esophagitis K21.9    History of marijuana use Z87.898    Physical debility R53.81    Anemia D64.9    Electrolyte imbalance E87.8    Type 2 diabetes mellitus with hyperglycemia, with long-term current use of insulin ([facility]) E11.65, Z79.4    Weakness R53.1    Infection of above knee amputation stump of right leg ([facility]) T87.43    Above knee amputation of right lower extremity ([facility]) H39.155E    Sepsis ([facility]) A41.9    Vitamin D deficiency E55.9    COPD exacerbation ([facility]) J44.1    Influenza B J10.1    Depression, recurrent ([facility]) F33.9    Major depressive disorder, recurrent ([facility]) F33.9    GI bleed K92.2    Elevated lipase R74.8    Pressure ulcer of left calf, unstageable

## 2020-05-27 RX ORDER — DICYCLOMINE HYDROCHLORIDE 10 MG/1
10 CAPSULE ORAL 4 TIMES DAILY
Qty: 120 CAPSULE | Refills: 1 | Status: SHIPPED | OUTPATIENT
Start: 2020-05-27 | End: 2020-06-18

## 2020-05-27 RX ORDER — GABAPENTIN 300 MG/1
300 CAPSULE ORAL 3 TIMES DAILY
Qty: 90 CAPSULE | Refills: 0 | Status: SHIPPED | OUTPATIENT
Start: 2020-05-27 | End: 2020-07-06 | Stop reason: SDUPTHER

## 2020-05-27 RX ORDER — LEVOTHYROXINE SODIUM 0.05 MG/1
50 TABLET ORAL DAILY
Qty: 30 TABLET | Refills: 5 | Status: SHIPPED | OUTPATIENT
Start: 2020-05-27 | End: 2020-07-06 | Stop reason: SDUPTHER

## 2020-06-01 ENCOUNTER — PROCEDURE VISIT (OUTPATIENT)
Dept: UROLOGY | Age: 50
End: 2020-06-01
Payer: MEDICARE

## 2020-06-01 VITALS — TEMPERATURE: 98.2 F | BODY MASS INDEX: 35.68 KG/M2 | WEIGHT: 222 LBS | HEIGHT: 66 IN

## 2020-06-01 LAB
BILIRUBIN URINE: NEGATIVE
BLOOD URINE, POC: NEGATIVE
CHARACTER, URINE: CLEAR
COLOR, URINE: YELLOW
GLUCOSE URINE: 500 MG/DL
KETONES, URINE: NEGATIVE
LEUKOCYTE CLUMPS, URINE: NEGATIVE
NITRITE, URINE: NEGATIVE
PH, URINE: 5.5 (ref 5–9)
POST VOID RESIDUAL (PVR): 91 ML
PROTEIN, URINE: NEGATIVE MG/DL
SPECIFIC GRAVITY, URINE: 1.02 (ref 1–1.03)
UROBILINOGEN, URINE: 0.2 EU/DL (ref 0–1)

## 2020-06-01 PROCEDURE — 99213 OFFICE O/P EST LOW 20 MIN: CPT | Performed by: UROLOGY

## 2020-06-01 PROCEDURE — 51798 US URINE CAPACITY MEASURE: CPT | Performed by: UROLOGY

## 2020-06-01 PROCEDURE — 81003 URINALYSIS AUTO W/O SCOPE: CPT | Performed by: UROLOGY

## 2020-06-01 PROCEDURE — 3017F COLORECTAL CA SCREEN DOC REV: CPT | Performed by: UROLOGY

## 2020-06-01 PROCEDURE — G8427 DOCREV CUR MEDS BY ELIG CLIN: HCPCS | Performed by: UROLOGY

## 2020-06-01 PROCEDURE — G8417 CALC BMI ABV UP PARAM F/U: HCPCS | Performed by: UROLOGY

## 2020-06-01 PROCEDURE — 1036F TOBACCO NON-USER: CPT | Performed by: UROLOGY

## 2020-06-01 PROCEDURE — 1111F DSCHRG MED/CURRENT MED MERGE: CPT | Performed by: UROLOGY

## 2020-06-01 NOTE — PROGRESS NOTES
what is in HPI  Skin: negative   Neurological: negative  Hematological/Lymphatic: negative  Psychological: negative    Physical Exam:      Vitals:    06/01/20 1009   Temp: 98.2 °F (36.8 °C)     Patient is a 44 y.o. male in no acute distress and alert and oriented to person, place and time. NAD, A/o  Non labored respiration  Normal peripheral pulses  Skin- warm and dry  Psych- normal mood and affect      Assessment and Plan      1. Urinary retention    2. Enlarged prostate    3.  Benign localized prostatic hyperplasia with lower urinary tract symptoms (LUTS)           Plan:      Refuses cysto in office  Will arrange for cysto under mac in [place]  Does not want catheter management of any kind             [facility]

## 2020-06-08 ENCOUNTER — PATIENT MESSAGE (OUTPATIENT)
Dept: INTERNAL MEDICINE CLINIC | Age: 50
End: 2020-06-08

## 2020-06-11 ENCOUNTER — NURSE ONLY (OUTPATIENT)
Dept: LAB | Age: 50
End: 2020-06-11

## 2020-06-11 ENCOUNTER — OFFICE VISIT (OUTPATIENT)
Dept: INTERNAL MEDICINE CLINIC | Age: 50
End: 2020-06-11
Payer: MEDICARE

## 2020-06-11 VITALS
DIASTOLIC BLOOD PRESSURE: 72 MMHG | HEART RATE: 73 BPM | SYSTOLIC BLOOD PRESSURE: 135 MMHG | BODY MASS INDEX: 35.68 KG/M2 | TEMPERATURE: 98.3 F | HEIGHT: 66 IN | WEIGHT: 222 LBS | RESPIRATION RATE: 12 BRPM

## 2020-06-11 LAB
ALBUMIN SERPL-MCNC: 4.1 G/DL (ref 3.5–5.1)
ALP BLD-CCNC: 82 U/L (ref 38–126)
ALT SERPL-CCNC: 31 U/L (ref 11–66)
ANION GAP SERPL CALCULATED.3IONS-SCNC: 14 MEQ/L (ref 8–16)
AST SERPL-CCNC: 36 U/L (ref 5–40)
BASOPHILS # BLD: 0.6 %
BASOPHILS ABSOLUTE: 0.1 THOU/MM3 (ref 0–0.1)
BILIRUB SERPL-MCNC: 0.3 MG/DL (ref 0.3–1.2)
BUN BLDV-MCNC: 10 MG/DL (ref 7–22)
CALCIUM SERPL-MCNC: 9.6 MG/DL (ref 8.5–10.5)
CHLORIDE BLD-SCNC: 92 MEQ/L (ref 98–111)
CHP ED QC CHECK: ABNORMAL
CO2: 25 MEQ/L (ref 23–33)
CREAT SERPL-MCNC: 0.8 MG/DL (ref 0.4–1.2)
EOSINOPHIL # BLD: 4.5 %
EOSINOPHILS ABSOLUTE: 0.5 THOU/MM3 (ref 0–0.4)
ERYTHROCYTE [DISTWIDTH] IN BLOOD BY AUTOMATED COUNT: 14.3 % (ref 11.5–14.5)
ERYTHROCYTE [DISTWIDTH] IN BLOOD BY AUTOMATED COUNT: 45.6 FL (ref 35–45)
GFR SERPL CREATININE-BSD FRML MDRD: > 90 ML/MIN/1.73M2
GLUCOSE BLD-MCNC: 349 MG/DL (ref 70–108)
GLUCOSE BLD-MCNC: 404 MG/DL
HCT VFR BLD CALC: 46.8 % (ref 42–52)
HEMOGLOBIN: 15.7 GM/DL (ref 14–18)
IMMATURE GRANS (ABS): 0.07 THOU/MM3 (ref 0–0.07)
IMMATURE GRANULOCYTES: 0.6 %
LYMPHOCYTES # BLD: 23.7 %
LYMPHOCYTES ABSOLUTE: 2.6 THOU/MM3 (ref 1–4.8)
MCH RBC QN AUTO: 29.4 PG (ref 26–33)
MCHC RBC AUTO-ENTMCNC: 33.5 GM/DL (ref 32.2–35.5)
MCV RBC AUTO: 87.6 FL (ref 80–94)
MONOCYTES # BLD: 7.2 %
MONOCYTES ABSOLUTE: 0.8 THOU/MM3 (ref 0.4–1.3)
NUCLEATED RED BLOOD CELLS: 0 /100 WBC
PLATELET # BLD: 226 THOU/MM3 (ref 130–400)
PMV BLD AUTO: 9.5 FL (ref 9.4–12.4)
POTASSIUM SERPL-SCNC: 4 MEQ/L (ref 3.5–5.2)
RBC # BLD: 5.34 MILL/MM3 (ref 4.7–6.1)
SEG NEUTROPHILS: 63.4 %
SEGMENTED NEUTROPHILS ABSOLUTE COUNT: 7 THOU/MM3 (ref 1.8–7.7)
SODIUM BLD-SCNC: 131 MEQ/L (ref 135–145)
TOTAL PROTEIN: 7.6 G/DL (ref 6.1–8)
TROPONIN T: < 0.01 NG/ML
WBC # BLD: 11 THOU/MM3 (ref 4.8–10.8)

## 2020-06-11 PROCEDURE — 93000 ELECTROCARDIOGRAM COMPLETE: CPT | Performed by: NURSE PRACTITIONER

## 2020-06-11 PROCEDURE — 99214 OFFICE O/P EST MOD 30 MIN: CPT | Performed by: NURSE PRACTITIONER

## 2020-06-11 PROCEDURE — 82962 GLUCOSE BLOOD TEST: CPT | Performed by: NURSE PRACTITIONER

## 2020-06-11 PROCEDURE — 1111F DSCHRG MED/CURRENT MED MERGE: CPT | Performed by: NURSE PRACTITIONER

## 2020-06-11 RX ORDER — METFORMIN HYDROCHLORIDE 500 MG/1
500 TABLET, EXTENDED RELEASE ORAL
Qty: 30 TABLET | Refills: 3 | Status: SHIPPED
Start: 2020-06-11 | End: 2020-06-21 | Stop reason: HOSPADM

## 2020-06-11 NOTE — PROGRESS NOTES
Post-Discharge Transitional Care Management Services or Hospital Follow Up      Pushpa Douglas   YOB: 1970    Date of Office Visit:  6/11/2020  Date of Hospital Admission: 6/25/20  Date of Hospital Discharge: 6/25/20  Readmission Risk Score(high >=14%. Medium >=10%):Readmission Risk Score: 52      Care management risk score Rising risk (score 2-5) and Complex Care (Scores >=6): 14     Non face to face  following discharge, date last encounter closed (first attempt may have been earlier): *No documented post hospital discharge outreach found in the last 14 days *No documented post hospital discharge outreach found in the last 14 days    Call initiated 2 business days of discharge: *No response recorded in the last 14 days     Patient Active Problem List   Diagnosis    Cellulitis    Status post below knee amputation of right lower extremity ([address]    Gait disturbance    Sebaceous cyst    Uncontrolled type 2 diabetes mellitus with hyperglycemia, with long-term current use of insulin ([address]    Severe bipolar II disorder, recent episode major depressive, remission ([address]    Bipolar 1 disorder (HCC)    Leukocytosis    Lactic acidosis    Hyponatremia    Normocytic anemia    Obesity (BMI 30-39. 9)    History of noncompliance with medical treatment    Essential hypertension    Cough    Tobacco dependence    Gastroesophageal reflux disease without esophagitis    History of marijuana use    Physical debility    Anemia    Electrolyte imbalance    Type 2 diabetes mellitus with hyperglycemia, with long-term current use of insulin (HCC)    Weakness    Infection of above knee amputation stump of right leg (HCC)    Above knee amputation of right lower extremity (HCC)    Sepsis (HCC)    Vitamin D deficiency    COPD exacerbation (HCC)    Influenza B    Depression, recurrent (HCC)    Major depressive disorder, recurrent (HCC)    GI bleed    Elevated lipase    Pressure ulcer of left calf, unstageable (HCC)    Other psychoactive substance abuse, uncomplicated (HCC)    Calculus of gallbladder without cholecystitis without obstruction    Right upper quadrant abdominal pain    Cellulitis of hand    Pedal edema    MURALI (obstructive sleep apnea)    Shortness of breath    Hypothyroid    Anxiety and depression    Dyspnea    Sinus tachycardia    Metabolic acidosis    Diabetic ulcer of lower extremity (HCC)    Ulcer, skin, non-healing (HCC)    Streptococcus infection, group A       Allergies   Allergen Reactions    Pcn [Penicillins] Shortness Of Breath, Nausea And Vomiting and Other (See Comments)     Does not remember reactions. Has TOLERATED amoxicillin and several different cephalosporins.  Clindamycin/Lincomycin Itching    Vancomycin Hives      Rash, with erythematous plaques to abdomen, shoulders, and proximal upper extremities with pruritis, persisted with 2nd dose administered slower.  Actos [Pioglitazone] Swelling       Medications listed as ordered at the time of discharge from hospital   Brigida Lozano   Home Medication Instructions TRICE:    Printed on:07/02/20 1415   Medication Information                      AMINO ACIDS COMPLEX PO  Take 1 each by mouth daily Daily AA drink             amoxicillin-clavulanate (AUGMENTIN) 875-125 MG per tablet  Take 1 tablet by mouth 2 times daily for 14 days             blood glucose monitor kit and supplies  Accu Chek Sheila meter. Use to test blood sugars DX:E11.65             blood glucose monitor strips  Accucheck Sheila Test Strips Test blood sugars 4 times daily DX:E11.65             gabapentin (NEURONTIN) 300 MG capsule  Take 1 capsule by mouth 3 times daily for 30 days.              insulin aspart (NOVOLOG FLEXPEN) 100 UNIT/ML injection pen  Inject 25 Units into the skin 3 times daily (before meals)             insulin detemir (LEVEMIR FLEXTOUCH) 100 UNIT/ML injection pen  Inject 85 Units into the skin 2 times daily Insulin Syringe-Needle U-100 29G X 1/2\" 0.3 ML MISC  1 each by Does not apply route 4 times daily (after meals and at bedtime)             Lancets MISC  Use to test blood sugars 4 times daily DX:E11.65             levothyroxine (SYNTHROID) 50 MCG tablet  Take 1 tablet by mouth daily             lisinopril (PRINIVIL;ZESTRIL) 5 MG tablet  Take 1 tablet by mouth daily             metoprolol tartrate (LOPRESSOR) 25 MG tablet  Take 1 tablet by mouth 2 times daily             ondansetron (ZOFRAN) 4 MG tablet  Take 1 tablet by mouth every 8 hours as needed for Nausea or Vomiting             sertraline (ZOLOFT) 50 MG tablet  Take 1 tablet by mouth daily                   Medications marked \"taking\" at this time  Outpatient Medications Marked as Taking for the 6/11/20 encounter (Office Visit) with MARILYN Garrido CNP   Medication Sig Dispense Refill    [DISCONTINUED] metFORMIN (GLUCOPHAGE-XR) 500 MG extended release tablet Take 1 tablet by mouth daily (with breakfast) 30 tablet 3    sertraline (ZOLOFT) 50 MG tablet Take 1 tablet by mouth daily 30 tablet 5    levothyroxine (SYNTHROID) 50 MCG tablet Take 1 tablet by mouth daily 30 tablet 5    gabapentin (NEURONTIN) 300 MG capsule Take 1 capsule by mouth 3 times daily for 30 days.  90 capsule 0    [DISCONTINUED] dicyclomine (BENTYL) 10 MG capsule Take 1 capsule by mouth 4 times daily 120 capsule 1    lisinopril (PRINIVIL;ZESTRIL) 5 MG tablet Take 1 tablet by mouth daily 30 tablet 0    [DISCONTINUED] cephALEXin (KEFLEX) 500 MG capsule Take 1 capsule by mouth every 8 hours 21 capsule 0    insulin detemir (LEVEMIR FLEXTOUCH) 100 UNIT/ML injection pen Inject 85 Units into the skin 2 times daily (Patient taking differently: Inject 55 Units into the skin 2 times daily ) 30 pen 5    [DISCONTINUED] insulin aspart (NOVOLOG FLEXPEN) 100 UNIT/ML injection pen Inject 25 Units into the skin 3 times daily (before meals) 5 pen 3    ondansetron (ZOFRAN) 4 MG tablet Take 1 tablet by mouth every 8 hours as needed for Nausea or Vomiting 30 tablet 0    [DISCONTINUED] pantoprazole (PROTONIX) 40 MG tablet Take 1 tablet by mouth 2 times daily (before meals) 60 tablet 0    [DISCONTINUED] sucralfate (CARAFATE) 1 GM tablet Take 1 tablet by mouth 4 times daily (before meals and nightly) 120 tablet 0    Lancets MISC Use to test blood sugars 4 times daily DX:E11.65 200 each 0    AMINO ACIDS COMPLEX PO Take 1 each by mouth daily Daily AA drink      blood glucose monitor kit and supplies Accu Chek Sheila meter. Use to test blood sugars DX:E11.65 1 kit 0    blood glucose monitor strips Accucheck Sheila Test Strips Test blood sugars 4 times daily DX:E11.65 400 strip 5    Insulin Syringe-Needle U-100 29G X 1/2\" 0.3 ML MISC 1 each by Does not apply route 4 times daily (after meals and at bedtime) 200 each 0        Medications patient taking as of now reconciled against medications ordered at time of hospital discharge: Yes    Chief Complaint   Patient presents with    Follow-Up from Hospital    Cellulitis       HPI    Here for ED follow up. DIscharge summary as follows:   Hospital Medicine Discharge Summary        Patient Identification:   Gracie Elam   : 1970  MRN: [id number]   Account: [de-identified]                 Patient's PCP: MARILYN Luna CNP     Admit Date: 2020      Discharge Date:   2020     Admitting Physician: MARILYN Chu CNP     Discharge Physician: MARILYN Chu CNP      Discharge Diagnoses:     1. Right hand 5th digit cellulitis.   2. Vancomycin reaction. 3. Hyponatremia, improved. 4. LLE edema, likely medication induced. 5. Leukocytosis, secondary to #1.    6. Ess HTN.   7. T2DM, uncontrolled.   8. Hypothyroidism. 9. Bipolar disorder  10. S/P right AKA.   11. MURALI, BiPAP.     The patient was seen and examined on day of discharge and this discharge summary is in conjunction with any daily progress note from day of discharge.     Hospital Course:      Chadwick Gutierrez is a 44 y.o. male admitted to [facility] on 5/12/2020 for cellulitis. Patient presented to the ED with pain, erythema and edema to right hand, 5th digit. Seen by ID. Started on Vancomycin and placed in isolation. Culture + streph and staph. 5/13 he developed a rash to his trunk and extremities following vancomycin infusion. 5/14 he was changed to oral doxy and keflex per ID. He did complain of LLE edema. Doppler was negative. No signs of CHF. Edema is likely due to Actos / Norvasc. These were recommended to ne held. Started on Lisinopril for BP control. Overall the hand is improved. He will be discharged home with a 7 day course of ATBs. He will see Dr. Ade Ferrari tomorrow in the clinic for close OP follow up. Inpatient course: Discharge summary reviewed- see chart. Interval history/Current status: Diaphoretic, states he has been sweating a lot for the past two weeks. Some mild chest discomfort mid chest, none currently. Glucose 404 now. Levemir 50 units today twice daily, Novolog 25 units TID. Has been drinking a lot of water, states no increase in urination. States his fit bit showed a heart rate that went from 126 to 66 in a matter of 3 minutes. States glucose was 190 this am.   Reports a sore on the side of his finger. Has healed well since hospitalization, saw Dr. Ade Ferrari. Does report some diarrhea. Working with urology for BPH, to have prostate resection. He has not followed up with Dr. Ade Ferrari. Review of Systems   Constitutional: Negative for chills, fatigue and fever. HENT: Negative for sore throat and trouble swallowing. Eyes: Negative for itching and visual disturbance. Respiratory: Negative for cough, choking and shortness of breath. Cardiovascular: Negative for chest pain and leg swelling. Gastrointestinal: Negative for abdominal pain, constipation, diarrhea, nausea and vomiting. Endocrine: Negative for cold intolerance and heat intolerance. Genitourinary: Negative for difficulty urinating and dysuria. Musculoskeletal: Negative for arthralgias and myalgias. Right leg amputee   Skin: Positive for wound (RIght 2nd finger scab ). Negative for rash. Neurological: Negative for dizziness, tremors, weakness, light-headedness, numbness and headaches. Psychiatric/Behavioral: Negative for agitation, dysphoric mood, sleep disturbance and suicidal ideas. The patient is not nervous/anxious. Vitals:    06/11/20 1521   BP: 135/72   Site: Left Upper Arm   Position: Sitting   Cuff Size: Large Adult   Pulse: 73   Resp: 12   Temp: 98.3 °F (36.8 °C)   TempSrc: Oral   Weight: 222 lb 0.1 oz (100.7 kg)   Height: 5' 5.98\" (1.676 m)     Body mass index is 35.85 kg/m². Wt Readings from Last 3 Encounters:   07/01/20 222 lb (100.7 kg)   06/25/20 222 lb (100.7 kg)   06/25/20 222 lb (100.7 kg)     BP Readings from Last 3 Encounters:   07/01/20 (!) 146/82   06/25/20 (!) 147/76   06/25/20 139/61       Physical Exam  Vitals signs reviewed. Constitutional:       General: He is not in acute distress. Appearance: He is well-developed. He is not diaphoretic. HENT:      Head: Normocephalic and atraumatic. Mouth/Throat:      Pharynx: No oropharyngeal exudate. Eyes:      General: No scleral icterus. Right eye: No discharge. Left eye: No discharge. Pupils: Pupils are equal, round, and reactive to light. Neck:      Musculoskeletal: Normal range of motion and neck supple. Thyroid: No thyromegaly. Cardiovascular:      Rate and Rhythm: Normal rate and regular rhythm. Heart sounds: Normal heart sounds. No murmur. No friction rub. No gallop. Pulmonary:      Effort: Pulmonary effort is normal. No respiratory distress. Breath sounds: Normal breath sounds. No wheezing or rales. Abdominal:      General: There is no distension.       Palpations: Abdomen is soft.      Tenderness: There is no abdominal tenderness. Musculoskeletal: Normal range of motion. General: Deformity (Right leg amputee) present. No tenderness. Lymphadenopathy:      Cervical: No cervical adenopathy. Skin:     General: Skin is warm and dry. Coloration: Skin is not pale. Findings: No erythema or rash. Comments: Scab to right 2nd finger   Neurological:      Mental Status: He is alert and oriented to person, place, and time. Cranial Nerves: No cranial nerve deficit. Assessment/Plan:  1. Uncontrolled type 2 diabetes mellitus with hyperglycemia, with long-term current use of insulin ([facility])  Check glucose three times daily  Restart Metformin 500 mg daily . Increase Levemir to 55 units twice daily. Novolog 25 units three times daily, if glucose is higher than 250, take 30 units. Call in glucose on Monday for further adjustment  Return in two weeks. - Comprehensive Metabolic Panel; Future  - Troponin; Future  - CBC With Auto Differential; Future  - POCT Glucose    2. Tachycardia  Pt has had mild tachycardia historically, will evaluate further with holter. Suspect a component of dehydration due to uncontrolled glucose, adjust insulins as above. Also will get labs to include BMP, troponin  - Holter Monitor 48 Hour; Future    3. Diaphoresis  - Comprehensive Metabolic Panel; Future  - Troponin; Future  - CBC With Auto Differential; Future    4. Essential hypertension  BP stable. - EKG 12 Lead    5. Hypothyroidism, unspecified type  TSH 4.530 in May, recheck due to tachycardia    6. Non-compliance  Often misses/cancels appts, and does not take medications as ordered. Affecting care as he does not follow through.          Medical Decision Making: moderate complexity

## 2020-06-12 ENCOUNTER — TELEPHONE (OUTPATIENT)
Dept: INTERNAL MEDICINE CLINIC | Age: 50
End: 2020-06-12

## 2020-06-12 NOTE — TELEPHONE ENCOUNTER
----- Message from MARILYN Dover CNP sent at 6/12/2020  8:05 AM EDT -----  Let him know labs were negative other than elevated glucose and sodium 131 because of this. Continue as planned with insulin increases and glucose reports.

## 2020-06-18 ENCOUNTER — HOSPITAL ENCOUNTER (OUTPATIENT)
Dept: WOUND CARE | Age: 50
Discharge: HOME | End: 2020-06-18
Payer: MEDICARE

## 2020-06-18 ENCOUNTER — TELEPHONE (OUTPATIENT)
Dept: UROLOGY | Age: 50
End: 2020-06-18

## 2020-06-18 ENCOUNTER — TELEPHONE (OUTPATIENT)
Dept: INTERNAL MEDICINE CLINIC | Age: 50
End: 2020-06-18

## 2020-06-18 ENCOUNTER — APPOINTMENT (OUTPATIENT)
Dept: GENERAL RADIOLOGY | Age: 50
End: 2020-06-18
Payer: MEDICARE

## 2020-06-18 ENCOUNTER — HOSPITAL ENCOUNTER (OUTPATIENT)
Age: 50
Setting detail: OBSERVATION
Discharge: HOME | End: 2020-06-20
Attending: EMERGENCY MEDICINE | Admitting: INTERNAL MEDICINE
Payer: MEDICARE

## 2020-06-18 VITALS
SYSTOLIC BLOOD PRESSURE: 150 MMHG | RESPIRATION RATE: 16 BRPM | OXYGEN SATURATION: 99 % | DIASTOLIC BLOOD PRESSURE: 70 MMHG | HEART RATE: 105 BPM | TEMPERATURE: 96.3 F

## 2020-06-18 PROBLEM — F32.A ANXIETY AND DEPRESSION: Status: ACTIVE | Noted: 2020-06-18

## 2020-06-18 PROBLEM — F41.9 ANXIETY AND DEPRESSION: Status: ACTIVE | Noted: 2020-06-18

## 2020-06-18 PROBLEM — E03.9 HYPOTHYROID: Status: ACTIVE | Noted: 2020-06-18

## 2020-06-18 PROBLEM — R06.02 SHORTNESS OF BREATH: Status: ACTIVE | Noted: 2020-06-18

## 2020-06-18 LAB
ALBUMIN SERPL-MCNC: 4.1 G/DL (ref 3.5–5.1)
ALP BLD-CCNC: 76 U/L (ref 38–126)
ALT SERPL-CCNC: 28 U/L (ref 11–66)
ANION GAP SERPL CALCULATED.3IONS-SCNC: 15 MEQ/L (ref 8–16)
AST SERPL-CCNC: 35 U/L (ref 5–40)
BACTERIA: ABNORMAL /HPF
BASOPHILS # BLD: 0.4 %
BASOPHILS ABSOLUTE: 0 THOU/MM3 (ref 0–0.1)
BILIRUB SERPL-MCNC: 0.3 MG/DL (ref 0.3–1.2)
BILIRUBIN DIRECT: < 0.2 MG/DL (ref 0–0.3)
BILIRUBIN URINE: NEGATIVE
BLOOD, URINE: NEGATIVE
BUN BLDV-MCNC: 12 MG/DL (ref 7–22)
CALCIUM SERPL-MCNC: 9.4 MG/DL (ref 8.5–10.5)
CASTS 2: ABNORMAL /LPF
CASTS UA: ABNORMAL /LPF
CHARACTER, URINE: CLEAR
CHLORIDE BLD-SCNC: 99 MEQ/L (ref 98–111)
CO2: 24 MEQ/L (ref 23–33)
COLOR: YELLOW
CREAT SERPL-MCNC: 0.7 MG/DL (ref 0.4–1.2)
CRYSTALS, UA: ABNORMAL
D-DIMER QUANTITATIVE: 293 NG/ML FEU (ref 0–500)
EKG ATRIAL RATE: 112 BPM
EKG P AXIS: 42 DEGREES
EKG P-R INTERVAL: 158 MS
EKG Q-T INTERVAL: 364 MS
EKG QRS DURATION: 80 MS
EKG QTC CALCULATION (BAZETT): 496 MS
EKG R AXIS: 25 DEGREES
EKG T AXIS: 51 DEGREES
EKG VENTRICULAR RATE: 112 BPM
EOSINOPHIL # BLD: 2.8 %
EOSINOPHILS ABSOLUTE: 0.3 THOU/MM3 (ref 0–0.4)
EPITHELIAL CELLS, UA: ABNORMAL /HPF
ERYTHROCYTE [DISTWIDTH] IN BLOOD BY AUTOMATED COUNT: 14.1 % (ref 11.5–14.5)
ERYTHROCYTE [DISTWIDTH] IN BLOOD BY AUTOMATED COUNT: 44.2 FL (ref 35–45)
GFR SERPL CREATININE-BSD FRML MDRD: > 90 ML/MIN/1.73M2
GLUCOSE BLD-MCNC: 229 MG/DL (ref 70–108)
GLUCOSE BLD-MCNC: 89 MG/DL (ref 70–108)
GLUCOSE URINE: >= 1000 MG/DL
HCT VFR BLD CALC: 46.1 % (ref 42–52)
HEMOGLOBIN: 15.8 GM/DL (ref 14–18)
IMMATURE GRANS (ABS): 0.06 THOU/MM3 (ref 0–0.07)
IMMATURE GRANULOCYTES: 0.5 %
KETONES, URINE: NEGATIVE
LEUKOCYTE ESTERASE, URINE: NEGATIVE
LYMPHOCYTES # BLD: 23.9 %
LYMPHOCYTES ABSOLUTE: 2.7 THOU/MM3 (ref 1–4.8)
MCH RBC QN AUTO: 29.7 PG (ref 26–33)
MCHC RBC AUTO-ENTMCNC: 34.3 GM/DL (ref 32.2–35.5)
MCV RBC AUTO: 86.7 FL (ref 80–94)
MISCELLANEOUS 2: ABNORMAL
MONOCYTES # BLD: 5.7 %
MONOCYTES ABSOLUTE: 0.6 THOU/MM3 (ref 0.4–1.3)
NITRITE, URINE: NEGATIVE
NUCLEATED RED BLOOD CELLS: 0 /100 WBC
OSMOLALITY CALCULATION: 274.9 MOSMOL/KG (ref 275–300)
PH UA: 6.5 (ref 5–9)
PLATELET # BLD: 252 THOU/MM3 (ref 130–400)
PMV BLD AUTO: 9.5 FL (ref 9.4–12.4)
POTASSIUM REFLEX MAGNESIUM: 3.6 MEQ/L (ref 3.5–5.2)
PRO-BNP: 10.5 PG/ML (ref 0–900)
PROTEIN UA: ABNORMAL
RBC # BLD: 5.32 MILL/MM3 (ref 4.7–6.1)
RBC URINE: ABNORMAL /HPF
RENAL EPITHELIAL, UA: ABNORMAL
SEG NEUTROPHILS: 66.7 %
SEGMENTED NEUTROPHILS ABSOLUTE COUNT: 7.4 THOU/MM3 (ref 1.8–7.7)
SODIUM BLD-SCNC: 138 MEQ/L (ref 135–145)
SPECIFIC GRAVITY, URINE: > 1.03 (ref 1–1.03)
TOTAL PROTEIN: 7.8 G/DL (ref 6.1–8)
TROPONIN T: < 0.01 NG/ML
TROPONIN T: < 0.01 NG/ML
TSH SERPL DL<=0.05 MIU/L-ACNC: 2.13 UIU/ML (ref 0.4–4.2)
UROBILINOGEN, URINE: 1 EU/DL (ref 0–1)
WBC # BLD: 11.1 THOU/MM3 (ref 4.8–10.8)
WBC UA: ABNORMAL /HPF
YEAST: ABNORMAL

## 2020-06-18 PROCEDURE — 1200000003 HC TELEMETRY R&B

## 2020-06-18 PROCEDURE — 71045 X-RAY EXAM CHEST 1 VIEW: CPT

## 2020-06-18 PROCEDURE — 80076 HEPATIC FUNCTION PANEL: CPT

## 2020-06-18 PROCEDURE — 99220 PR INITIAL OBSERVATION CARE/DAY 70 MINUTES: CPT | Performed by: INTERNAL MEDICINE

## 2020-06-18 PROCEDURE — 84443 ASSAY THYROID STIM HORMONE: CPT

## 2020-06-18 PROCEDURE — 99285 EMERGENCY DEPT VISIT HI MDM: CPT

## 2020-06-18 PROCEDURE — 2580000003 HC RX 258: Performed by: INTERNAL MEDICINE

## 2020-06-18 PROCEDURE — 93005 ELECTROCARDIOGRAM TRACING: CPT | Performed by: EMERGENCY MEDICINE

## 2020-06-18 PROCEDURE — G0378 HOSPITAL OBSERVATION PER HR: HCPCS

## 2020-06-18 PROCEDURE — 36415 COLL VENOUS BLD VENIPUNCTURE: CPT

## 2020-06-18 PROCEDURE — 82948 REAGENT STRIP/BLOOD GLUCOSE: CPT

## 2020-06-18 PROCEDURE — 80048 BASIC METABOLIC PNL TOTAL CA: CPT

## 2020-06-18 PROCEDURE — 84484 ASSAY OF TROPONIN QUANT: CPT

## 2020-06-18 PROCEDURE — 99212 OFFICE O/P EST SF 10 MIN: CPT

## 2020-06-18 PROCEDURE — 85379 FIBRIN DEGRADATION QUANT: CPT

## 2020-06-18 PROCEDURE — 83880 ASSAY OF NATRIURETIC PEPTIDE: CPT

## 2020-06-18 PROCEDURE — 6370000000 HC RX 637 (ALT 250 FOR IP): Performed by: INTERNAL MEDICINE

## 2020-06-18 PROCEDURE — 85025 COMPLETE CBC W/AUTO DIFF WBC: CPT

## 2020-06-18 PROCEDURE — 81001 URINALYSIS AUTO W/SCOPE: CPT

## 2020-06-18 RX ORDER — ACETAMINOPHEN 650 MG/1
650 SUPPOSITORY RECTAL EVERY 6 HOURS PRN
Status: DISCONTINUED | OUTPATIENT
Start: 2020-06-18 | End: 2020-06-20 | Stop reason: HOSPADM

## 2020-06-18 RX ORDER — INSULIN GLARGINE 100 [IU]/ML
85 INJECTION, SOLUTION SUBCUTANEOUS 2 TIMES DAILY
Status: DISCONTINUED | OUTPATIENT
Start: 2020-06-18 | End: 2020-06-20 | Stop reason: HOSPADM

## 2020-06-18 RX ORDER — DOXYCYCLINE HYCLATE 100 MG/1
100 CAPSULE ORAL 2 TIMES DAILY
COMMUNITY
End: 2020-06-18

## 2020-06-18 RX ORDER — GABAPENTIN 300 MG/1
300 CAPSULE ORAL 3 TIMES DAILY
Status: DISCONTINUED | OUTPATIENT
Start: 2020-06-18 | End: 2020-06-20 | Stop reason: HOSPADM

## 2020-06-18 RX ORDER — POLYETHYLENE GLYCOL 3350 17 G/17G
17 POWDER, FOR SOLUTION ORAL DAILY PRN
Status: DISCONTINUED | OUTPATIENT
Start: 2020-06-18 | End: 2020-06-20 | Stop reason: HOSPADM

## 2020-06-18 RX ORDER — PROMETHAZINE HYDROCHLORIDE 25 MG/1
12.5 TABLET ORAL EVERY 6 HOURS PRN
Status: DISCONTINUED | OUTPATIENT
Start: 2020-06-18 | End: 2020-06-20 | Stop reason: HOSPADM

## 2020-06-18 RX ORDER — SODIUM CHLORIDE 0.9 % (FLUSH) 0.9 %
10 SYRINGE (ML) INJECTION PRN
Status: DISCONTINUED | OUTPATIENT
Start: 2020-06-18 | End: 2020-06-20 | Stop reason: HOSPADM

## 2020-06-18 RX ORDER — DEXTROSE MONOHYDRATE 50 MG/ML
100 INJECTION, SOLUTION INTRAVENOUS PRN
Status: DISCONTINUED | OUTPATIENT
Start: 2020-06-18 | End: 2020-06-20 | Stop reason: HOSPADM

## 2020-06-18 RX ORDER — LISINOPRIL 5 MG/1
5 TABLET ORAL DAILY
Status: DISCONTINUED | OUTPATIENT
Start: 2020-06-19 | End: 2020-06-20 | Stop reason: HOSPADM

## 2020-06-18 RX ORDER — DEXTROSE MONOHYDRATE 25 G/50ML
12.5 INJECTION, SOLUTION INTRAVENOUS PRN
Status: DISCONTINUED | OUTPATIENT
Start: 2020-06-18 | End: 2020-06-20 | Stop reason: HOSPADM

## 2020-06-18 RX ORDER — ACETAMINOPHEN 325 MG/1
650 TABLET ORAL EVERY 6 HOURS PRN
Status: DISCONTINUED | OUTPATIENT
Start: 2020-06-18 | End: 2020-06-20 | Stop reason: HOSPADM

## 2020-06-18 RX ORDER — SODIUM CHLORIDE 0.9 % (FLUSH) 0.9 %
10 SYRINGE (ML) INJECTION EVERY 12 HOURS SCHEDULED
Status: DISCONTINUED | OUTPATIENT
Start: 2020-06-18 | End: 2020-06-20 | Stop reason: HOSPADM

## 2020-06-18 RX ORDER — NICOTINE POLACRILEX 4 MG
15 LOZENGE BUCCAL PRN
Status: DISCONTINUED | OUTPATIENT
Start: 2020-06-18 | End: 2020-06-20 | Stop reason: HOSPADM

## 2020-06-18 RX ORDER — IPRATROPIUM BROMIDE AND ALBUTEROL SULFATE 2.5; .5 MG/3ML; MG/3ML
1 SOLUTION RESPIRATORY (INHALATION) EVERY 6 HOURS
Status: DISCONTINUED | OUTPATIENT
Start: 2020-06-18 | End: 2020-06-19

## 2020-06-18 RX ORDER — ONDANSETRON 2 MG/ML
4 INJECTION INTRAMUSCULAR; INTRAVENOUS EVERY 6 HOURS PRN
Status: DISCONTINUED | OUTPATIENT
Start: 2020-06-18 | End: 2020-06-20 | Stop reason: HOSPADM

## 2020-06-18 RX ORDER — METHYLPREDNISOLONE SODIUM SUCCINATE 40 MG/ML
40 INJECTION, POWDER, LYOPHILIZED, FOR SOLUTION INTRAMUSCULAR; INTRAVENOUS DAILY
Status: DISCONTINUED | OUTPATIENT
Start: 2020-06-19 | End: 2020-06-19

## 2020-06-18 RX ORDER — LEVOTHYROXINE SODIUM 0.05 MG/1
50 TABLET ORAL DAILY
Status: DISCONTINUED | OUTPATIENT
Start: 2020-06-19 | End: 2020-06-20 | Stop reason: HOSPADM

## 2020-06-18 RX ADMIN — Medication 10 ML: at 22:59

## 2020-06-18 RX ADMIN — GABAPENTIN 300 MG: 300 CAPSULE ORAL at 22:59

## 2020-06-18 RX ADMIN — INSULIN GLARGINE 85 UNITS: 100 INJECTION, SOLUTION SUBCUTANEOUS at 23:01

## 2020-06-18 ASSESSMENT — ENCOUNTER SYMPTOMS
NAUSEA: 0
SHORTNESS OF BREATH: 1
ABDOMINAL PAIN: 0
COUGH: 1
VOMITING: 0

## 2020-06-18 ASSESSMENT — PAIN SCALES - GENERAL
PAINLEVEL_OUTOF10: 0
PAINLEVEL_OUTOF10: 9

## 2020-06-18 ASSESSMENT — PAIN DESCRIPTION - PAIN TYPE: TYPE: CHRONIC PAIN

## 2020-06-18 ASSESSMENT — PAIN DESCRIPTION - LOCATION: LOCATION: FINGER (COMMENT WHICH ONE)

## 2020-06-18 NOTE — PLAN OF CARE
Problem: Wound:  Goal: Will show signs of wound healing; wound closure and no evidence of infection  Description: Will show signs of wound healing; wound closure and no evidence of infection  Outcome: Ongoing     Patient presents to wound clinic for right finger wound. No s/s of infection noted. See AVS for discharge instructions. Follow up visit: 2 weeks on Wednesday July 1st at 9:00 am    Care plan reviewed with patient. Patient verbalizes understanding of the plan of care and contribute to goal setting.

## 2020-06-18 NOTE — PROGRESS NOTES
[facility]          Progress Note and Procedure Note      [facility] RECORD NUMBER:  [id number]  AGE: 44 y.o. GENDER: male  : 1970  EPISODE DATE:  2020    Subjective:     SUBJECTIVE     Chief Complaint   Patient presents with    Wound Check     right finger            HISTORY OF PRESENT ILLNESS      Lisa Shin is a 44 y.o. male who presents today for wound/ulcer evaluation. He sustained a burn on his right index finger over a week ago. It has formed a scab. He has some discomfort, he denies any fever or chills. He has right above-knee amputation and has left lower leg wound. The left leg wound is improving most of the wounds have healed he has history of diabetes with neuropathy hypertension     PAST MEDICAL HISTORY         Diagnosis Date    Bipolar 2 disorder [facility])     previously followed with Dr. Natalie Ivan and Rao Parkinson in [facility] BPH (benign prostatic hyperplasia)     Diabetes mellitus type 2, uncontrolled ([address]     HgbA1c on 2019 was 9.1.     Diabetic peripheral neuropathy (HCC)     Diabetic polyneuropathy ([address]     Diabetic ulcer of right foot associated with type 2 diabetes mellitus ([address] 12/10/2015    Essential hypertension     \"never been on b/p medication that I know of\"    GERD (gastroesophageal reflux disease)     Hammer toe of left foot     Heart murmur     denies any chest pain or palpitations    History of tobacco abuse     Hx of AKA (above knee amputation), right ([address] 2019    Dr. Azalea Coyne edema, diabetic, bilateral ([address] 2018    Dr. Valeria Burden referred to retina specialist for 2nd opinion    Marijuana abuse in remission     Melena     MRSA (methicillin resistant staph aureus) culture positive     Onychomycosis     Other disorders of kidney and ureter in diseases classified elsewhere     Sleep apnea     no cpap    Thyroid disease     WD-Skin ulcer of fourth toe of right foot with necrosis of bone ([facility] 2016         PAST SURGICAL HISTORY     Past Surgical History:   Procedure Laterality Date    ABSCESS DRAINAGE Right     foot    AMPUTATION ABOVE KNEE Right 2019    RIGHT ABOVE KNEE AMPUTATION performed by Gabby Almonte MD at [address] [facility], LAPAROSCOPIC N/A 2020    ROBOTIC CHOLECYSTECTOMY performed by Danni Villa MD at [address] [facility] [address], DIAGNOSTIC      FOOT DEBRIDEMENT Right 2016    I & D    INCISION AND DRAINAGE Right 2019    I&D RIGHT STUMP performed by Gabby Almonte MD at [address] [facility] Right 2016    LEG AMPUTATION BELOW KNEE Right 2019    I&D AND REVISION OF AMPUTATION RIGHT LEG performed by Gabby Almonte MD at [address] [facility] Right 1/14/15    sole of foot I&D    PA DRAIN INFECT SHOULDER BURSA Left 2017    LEFT SHOULDER INCISION AND DRAINAGE performed by Gabby Almonte MD at [address] [facility] OFFICE/OUTPT [facility] Right 2018    EXCISIONAL DEBRIDEMENT RIGHT BKA STUMP performed by Irena Neal MD at [address] Right 1/16/15    2nd toe with wound vac applied    UPPER GASTROINTESTINAL ENDOSCOPY N/A 3/3/2020    EGD DILATION SAVORY performed by Kareem Bey MD at [address] [facility]  ? when     FAMILY HISTORY         Family History   Problem Relation Age of Onset    Diabetes Mother     Other Mother         pneumonia, H1N1    Depression Mother     Early Death Mother     High Blood Pressure Mother     High Cholesterol Mother     Vision Loss Maternal Grandmother     Arthritis Maternal Grandfather     Heart Disease Maternal Grandfather      SOCIAL HISTORY       Social History     Tobacco Use    Smoking status: Former Smoker     Packs/day: 1.00     Years: 10.00     Pack years: 10.00     Types: Cigars     Start date: 1985     Last attempt to quit: 2000     Years since quittin.8    Smokeless atraumatic, pink conjunctiva,  anicteric sclera  Neck - Supple, no mass  Neurologic -oriented  Skin - No bruising or bleeding  Extremities - No edema, no cyanosis, clubbing   Above-knee amputation. Right index finger he has an open scabbed wound around the middle interphalangeal area there is no purulent drainage no redness    Wound 06/18/20 Finger (Comment which one) Right (Active)   Wound Image   6/18/2020  9:12 AM   Dressing Status Intact; Old drainage; Changed 6/18/2020  9:12 AM   Dressing Changed Changed/New 6/18/2020  9:12 AM   Wound Cleansed Rinsed/Irrigated with saline 6/18/2020  9:12 AM   Wound Length (cm) 1 cm 6/18/2020  9:12 AM   Wound Width (cm) 0.6 cm 6/18/2020  9:12 AM   Wound Depth (cm) 0 cm 6/18/2020  9:12 AM   Wound Surface Area (cm^2) 0.6 cm^2 6/18/2020  9:12 AM   Wound Volume (cm^3) 0 cm^3 6/18/2020  9:12 AM   Wound Assessment Black 6/18/2020  9:12 AM   Drainage Amount Small 6/18/2020  9:12 AM   Drainage Description Serosanguinous 6/18/2020  9:12 AM   Odor None 6/18/2020  9:12 AM   Margins Attached edges 6/18/2020  9:12 AM   Louise-wound Assessment Induration;Calloused;Red;Blanchable erythema;Painful 6/18/2020  9:12 AM   Black%Wound Bed 100 6/18/2020  9:12 AM   Number of days: 0       LABS      Lab Results   Component Value Date    BC No growth-preliminary No growth  05/12/2020       Assessment:   Right index finger burn: The wound has formed a scab advised to use Betadine and clean the wound. Left lower leg nonhealing wound improving he will continue Betadine.   Follow-up will be scheduled in 2 weeks to follow his right index finger wound  Patient Active Problem List   Diagnosis Code    Cellulitis L03.90    Status post below knee amputation of right lower extremity ([facility] Z89.511    Gait disturbance R26.9    Sebaceous cyst L72.3    Uncontrolled type 2 diabetes mellitus with hyperglycemia, with long-term current use of insulin ([facility]) E11.65, Z79.4    Severe bipolar II disorder, recent episode major depressive, remission (HCC) F31.81    Bipolar 1 disorder (HCC) F31.9    Leukocytosis D72.829    Lactic acidosis E87.2    Hyponatremia E87.1    Normocytic anemia D64.9    Obesity (BMI 30-39. 9) E66.9    History of noncompliance with medical treatment Z91.19    Essential hypertension I10    Tobacco dependence F17.200    Gastroesophageal reflux disease without esophagitis K21.9    History of marijuana use Z87.898    Physical debility R53.81    Anemia D64.9    Electrolyte imbalance E87.8    Type 2 diabetes mellitus with hyperglycemia, with long-term current use of insulin ([facility]) E11.65, Z79.4    Weakness R53.1    Infection of above knee amputation stump of right leg ([facility]) T87.43    Above knee amputation of right lower extremity ([facility]) F70.128B    Sepsis ([facility]) A41.9    Vitamin D deficiency E55.9    COPD exacerbation ([facility]) J44.1    Influenza B J10.1    Depression, recurrent ([facility]) F33.9    Major depressive disorder, recurrent ([facility]) F33.9    GI bleed K92.2    Elevated lipase R74.8    Pressure ulcer of left calf, unstageable ([facility]) L89.890    Other psychoactive substance abuse, uncomplicated ([facility]) B34.86    Calculus of gallbladder without cholecystitis without obstruction K80.20    Right upper quadrant abdominal pain R10.11    Cellulitis of hand L03.119    Pedal edema R60.0    MURALI (obstructive sleep apnea) G47.33      Plan:     Patient examined and evaluated    Please see attached Discharge Instructions    Written patient dismissal instructions given to patient and signed by patient or POA. Discharge Instructions       Visit Discharge/Physician Orders:  -wash hands in dial soap apply betadine to scabbed areas   -may use Eucerin lotion or Vaseline to help with dry cracked skin     Wound Location: Right 2nd finger      Follow up visit: 2 weeks on Wednesday July 1st at 9:00 am      Keep next scheduled appointment. Please give 24 hour notice if unable to keep appointment. [phone]     If you experience any of the following, please call the Wound Care Service during business hours: Monday through Friday 8:00 am - 4:30 pm  ([phone]). *Increase in pain              *Temperature over 101              *Increase in drainage from your wound or a foul odor              *Uncontrolled swelling              *Need for compression bandage changes due to slippage, breakthrough drainage     If you need medical attention outside of business hours, please contact your Primary Care Doctor or go to the nearest emergency room.          Electronically signed by Laura Parker MD on 6/18/2020 at 9:19 AM

## 2020-06-18 NOTE — ED TRIAGE NOTES
Patient arrived to room 5 with c/o SOB and heart fluttering. Patient stated this started about 3 weeks ago and has been updating his PCP. Patient stated he gets real SOB when he changes position. Patient stated he feels his heart fluttering and when he has taken his HR the highest he has seen is 145. Patient stated he gets very diaphoretic. Patient is noted to be diaphoretic at this time. EKG completed. Patient placed on monitor and vitals obtained.

## 2020-06-18 NOTE — ED NOTES
Assumed care at this time. Bedside report received from Jany Bazan. Pt alert and sitting up in bed. No distress noted. Pt on the side of the bedto urinate for sample.  Will continue to monitor      Bart Murray RN  06/18/20 9059

## 2020-06-18 NOTE — ED NOTES
Pt states he felt SOB when he was urinating. Pt requested an update at this time. Will notify provider.      Francesca Sal RN  06/18/20 1920

## 2020-06-18 NOTE — ED NOTES
ED nurse-to-nurse bedside report    Chief Complaint   Patient presents with    Shortness of Breath    Other     heart problems       LOC: alert and orientated to name, place, date  Vital signs   Vitals:    06/18/20 1745 06/18/20 1854   BP: (!) 150/75 132/73   Pulse: 114 111   Resp: 16 22   Temp: 98.6 °F (37 °C)    TempSrc: Oral    SpO2: 100% 99%   Weight: 222 lb (100.7 kg)    Height: 5' 6\" (1.676 m)       Pain:    Pain Interventions: none  Pain Goal: none  Oxygen: NA    Current needs required none   Telemetry: Yes  LDAs:   Peripheral IV 06/18/20 Right Antecubital (Active)   Site Assessment Clean;Dry; Intact 6/18/2020  5:59 PM   Line Status Blood return noted;Brisk blood return;Flushed;Normal saline locked;Specimen collected 6/18/2020  5:59 PM   Dressing Status Clean;Dry; Intact 6/18/2020  5:59 PM   Dressing Intervention New 6/18/2020  5:59 PM     Continuous Infusions:   Mobility: Requires assistance * 1  [place] Fall Risk Score:    Fall Risk 6/4/2019   2 or more falls in past year? no   Fall with injury in past year? no     Fall Interventions: hourly rounding  Report given to: Anais Sinha RN  06/18/20 9950

## 2020-06-18 NOTE — ED PROVIDER NOTES
[facility]  eMERGENCY dEPARTMENT eNCOUnter          CHIEF COMPLAINT       Chief Complaint   Patient presents with    Shortness of Breath    Other     heart problems        Nurses Notes reviewed and I agree except as noted in the HPI. HISTORY OF PRESENT ILLNESS    Abdulkadir Bolton is a 44 y.o. male who presents to the Emergency Department for the evaluation of shortness of breath and palpitations. Patient reports palpitations, chest pain, shortness of breath, and light headedness upon exertion for three weeks. He notes a productive cough with clear phlegm, leg swelling, and diaphoresis. Patient reports having an increased heart rate in 130s and 140s. He states that he is able to check his heart rate on his FitBit and that last night it was 135. Today the patient states he felt his heart \"flutter\". He denies fever or chills. He denies experiencing this before. He is not anticoagulated. Patient does not follow with a cardiologist. Past medical history of right AKA, DM2, HTN, GERD, heart murmur, sleep apnea, and thyroid disease. No further complaints at initial encounter. The HPI was provided by the patient. REVIEW OF SYSTEMS     Review of Systems   Constitutional: Positive for diaphoresis. Negative for fever. Respiratory: Positive for cough and shortness of breath. Cardiovascular: Positive for chest pain, palpitations and leg swelling. Gastrointestinal: Negative for abdominal pain, nausea and vomiting. Musculoskeletal: Negative for arthralgias. Neurological: Positive for light-headedness.        PAST MEDICAL HISTORY    has a past medical history of Bipolar 2 disorder (Marisela Baeza), BPH (benign prostatic hyperplasia), Diabetes mellitus type 2, uncontrolled (Marisela Baeza), Diabetic peripheral neuropathy (Marisela Baeza), Diabetic polyneuropathy (Marisela Leonis), Diabetic ulcer of right foot associated with type 2 diabetes mellitus (Marisela Baeza), Essential hypertension, GERD (gastroesophageal reflux disease), Hammer toe of left LEVOTHYROXINE (SYNTHROID) 50 MCG TABLET    Take 1 tablet by mouth daily    LISINOPRIL (PRINIVIL;ZESTRIL) 5 MG TABLET    Take 1 tablet by mouth daily    METFORMIN (GLUCOPHAGE-XR) 500 MG EXTENDED RELEASE TABLET    Take 1 tablet by mouth daily (with breakfast)    ONDANSETRON (ZOFRAN) 4 MG TABLET    Take 1 tablet by mouth every 8 hours as needed for Nausea or Vomiting    SERTRALINE (ZOLOFT) 50 MG TABLET    Take 1 tablet by mouth daily       ALLERGIES     is allergic to pcn [penicillins]; clindamycin/lincomycin; vancomycin; and actos [pioglitazone]. FAMILY HISTORY     He indicated that his mother is . He reported the following about his father: unknown. He indicated that the status of his maternal grandmother is unknown. He indicated that the status of his maternal grandfather is unknown.   family history includes Arthritis in his maternal grandfather; Depression in his mother; Diabetes in his mother; Early Death in his mother; Heart Disease in his maternal grandfather; High Blood Pressure in his mother; High Cholesterol in his mother; Other in his mother; Vision Loss in his maternal grandmother. SOCIAL HISTORY      reports that he quit smoking about 19 years ago. His smoking use included cigars. He started smoking about 35 years ago. He has a 10.00 pack-year smoking history. He has never used smokeless tobacco. He reports previous alcohol use. He reports that he does not use drugs. PHYSICAL EXAM     INITIAL VITALS:  height is 5' 6\" (1.676 m) and weight is 222 lb (100.7 kg). His oral temperature is 98.6 °F (37 °C). His blood pressure is 118/64 and his pulse is 109. His respiration is 20 and oxygen saturation is 96%. Physical Exam  Vitals signs and nursing note reviewed. Constitutional:       Appearance: He is well-developed. He is not toxic-appearing or diaphoretic. HENT:      Head: Normocephalic and atraumatic.       Right Ear: External ear normal.      Left Ear: External ear normal.      Nose: Nose normal.   Eyes:      Conjunctiva/sclera: Conjunctivae normal.   Neck:      Musculoskeletal: Normal range of motion and neck supple. Vascular: No JVD. Cardiovascular:      Rate and Rhythm: Normal rate and regular rhythm. Pulses: Normal pulses. Heart sounds: Normal heart sounds. No murmur. No friction rub. No gallop. Pulmonary:      Effort: Pulmonary effort is normal. No respiratory distress. Breath sounds: Decreased breath sounds (bilaterally) present. No wheezing, rhonchi or rales. Abdominal:      General: Bowel sounds are normal. There is no distension. Palpations: Abdomen is soft. Tenderness: There is no abdominal tenderness. There is no guarding or rebound. Musculoskeletal: Normal range of motion. Left lower leg: He exhibits no tenderness. 4+ Pitting Edema present. Feet:      Right foot:      Amputation: Right leg is amputated above knee. Skin:     General: Skin is warm and dry. Findings: No rash. Neurological:      Mental Status: He is alert and oriented to person, place, and time. Motor: No abnormal muscle tone.       Coordination: Coordination normal.         DIFFERENTIAL DIAGNOSIS:   Including but not limited to CHF exacerbation, electrolyte abnormality, MI, ACS, PE    DIAGNOSTIC RESULTS     EKG: All EKG's are interpreted by the Emergency Department Physician who either signs or Co-signs this chart in the absence of a cardiologist.  EKG interpreted by Bryan Menendez DO:    EKG 12 Lead (Preliminary result)    Component (Lab Inquiry)   Collection Time Result Time Ventricular Rate Atrial Rate P-R Interval QRS Duration Q-T Interval   06/18/20 17:46:10 06/18/20 18:06:11 112 112 158 80 364       Collection Time Result Time QTc Calculation (Bazett) P Axis R Axis T Axis   06/18/20 17:46:10 06/18/20 18:06:11 496 42 25 51         Preliminary result                Narrative:     Poor data quality, interpretation may be adversely affected  Sinus tachycardia  Otherwise normal ECG  When compared with ECG of 17-MAY-2020 20:58,  No significant change was found                RADIOLOGY: non-plain film images(s) such as CT, Ultrasound and MRI are read by the radiologist.    XR CHEST PORTABLE   Final Result   Stable radiographic appearance of the chest. No evidence of an acute process. **This report has been created using voice recognition software. It may contain minor errors which are inherent in voice recognition technology. **      Final report electronically signed by Dr. Kera Amin MD on 6/18/2020 6:11 PM           LABS:   Labs Reviewed   CBC WITH AUTO DIFFERENTIAL - Abnormal; Notable for the following components:       Result Value    WBC 11.1 (*)     All other components within normal limits   OSMOLALITY - Abnormal; Notable for the following components:    Osmolality Calc 274.9 (*)     All other components within normal limits   URINE WITH REFLEXED MICRO - Abnormal; Notable for the following components:    Glucose, Ur >= 1000 (*)     Specific Gravity, Urine > 1.030 (*)     Protein, UA TRACE (*)     All other components within normal limits   BASIC METABOLIC PANEL W/ REFLEX TO MG FOR LOW K   HEPATIC FUNCTION PANEL   TROPONIN   BRAIN NATRIURETIC PEPTIDE   TSH WITHOUT REFLEX   D-DIMER, QUANTITATIVE   ANION GAP   GLOMERULAR FILTRATION RATE, ESTIMATED       EMERGENCY DEPARTMENT COURSE:   Vitals:    Vitals:    06/18/20 1854 06/18/20 1926 06/18/20 2009 06/18/20 2055   BP: 132/73 120/67 125/67 118/64   Pulse: 111 113 115 109   Resp: 22 20 19 20   Temp:       TempSrc:       SpO2: 99% 99% 99% 96%   Weight:       Height:           6:06 PM EDT: The patient was seen and evaluated. Initial orders placed. Dr. Corie Corona (Hospitalist) was consulted and graciously agreed to admit the patient. MDM:  Patient with shortness of breath with exertion, intermittent chest pain, and near syncope. Patient with DM and PAD resulting in AKA.  He remained stable during this ED course. His troponin, ecg, dimer were negative, lungs clear, covid negative. Cardiac vs pulmonary cause. Dr. Edouard Whitaker accepted admission. CRITICAL CARE:   None      CONSULTS:  Dr. Davian Gabriel:  None      FINAL IMPRESSION      1. Chest pain, unspecified type    2. Near syncope    3. Shortness of breath          DISPOSITION/PLAN   Admit     PATIENT REFERRED TO:  Alphonse Hills, APRN - [facility]  [address] [place] [address]  [phone]            DISCHARGE MEDICATIONS:  Current Discharge Medication List          (Please note that portions of this note were completed with a voice recognition program.  Efforts were made to edit the dictations but occasionally words are mis-transcribed.)    Scribe:  Cole Olson  6/18/20 6:06 PM EDT Scribing for and in the presence of Dylan Lazar DO. Signed by: Denia Davenport, 06/18/20 9:04 PM    Provider:  I personally performed the services described in the documentation, reviewed and edited the documentation which was dictated to the scribe in my presence, and it accurately records my words and actions.     Dylan Lazar DO 6/18/20 9:04 PM        Dylan Lazar DO  06/19/20 9541

## 2020-06-19 ENCOUNTER — APPOINTMENT (OUTPATIENT)
Dept: NON INVASIVE DIAGNOSTICS | Age: 50
End: 2020-06-19
Payer: MEDICARE

## 2020-06-19 ENCOUNTER — APPOINTMENT (OUTPATIENT)
Dept: CT IMAGING | Age: 50
End: 2020-06-19
Payer: MEDICARE

## 2020-06-19 PROBLEM — R06.00 DYSPNEA: Status: ACTIVE | Noted: 2020-06-19

## 2020-06-19 LAB
ALLEN TEST: POSITIVE
ANION GAP SERPL CALCULATED.3IONS-SCNC: 11 MEQ/L (ref 8–16)
BASE EXCESS (CALCULATED): -4.1 MMOL/L (ref -2.5–2.5)
BUN BLDV-MCNC: 11 MG/DL (ref 7–22)
CALCIUM SERPL-MCNC: 9 MG/DL (ref 8.5–10.5)
CHLORIDE BLD-SCNC: 102 MEQ/L (ref 98–111)
CO2: 21 MEQ/L (ref 23–33)
COLLECTED BY:: ABNORMAL
CREAT SERPL-MCNC: 0.8 MG/DL (ref 0.4–1.2)
ERYTHROCYTE [DISTWIDTH] IN BLOOD BY AUTOMATED COUNT: 14.4 % (ref 11.5–14.5)
ERYTHROCYTE [DISTWIDTH] IN BLOOD BY AUTOMATED COUNT: 46.3 FL (ref 35–45)
GFR SERPL CREATININE-BSD FRML MDRD: > 90 ML/MIN/1.73M2
GLUCOSE BLD-MCNC: 169 MG/DL (ref 70–108)
GLUCOSE BLD-MCNC: 235 MG/DL (ref 70–108)
GLUCOSE BLD-MCNC: 365 MG/DL (ref 70–108)
GLUCOSE BLD-MCNC: 423 MG/DL (ref 70–108)
GLUCOSE BLD-MCNC: 515 MG/DL (ref 70–108)
GLUCOSE BLD-MCNC: 536 MG/DL (ref 70–108)
HCO3: 19 MMOL/L (ref 23–28)
HCT VFR BLD CALC: 43.2 % (ref 42–52)
HEMOGLOBIN: 14.4 GM/DL (ref 14–18)
LACTIC ACID: 5 MMOL/L (ref 0.5–2.2)
LACTIC ACID: 5.4 MMOL/L (ref 0.5–2.2)
MCH RBC QN AUTO: 29.7 PG (ref 26–33)
MCHC RBC AUTO-ENTMCNC: 33.3 GM/DL (ref 32.2–35.5)
MCV RBC AUTO: 89.1 FL (ref 80–94)
O2 SATURATION: 97 %
PCO2: 30 MMHG (ref 35–45)
PH BLOOD GAS: 7.41 (ref 7.35–7.45)
PLATELET # BLD: 189 THOU/MM3 (ref 130–400)
PMV BLD AUTO: 9.5 FL (ref 9.4–12.4)
PO2: 87 MMHG (ref 71–104)
POTASSIUM SERPL-SCNC: 3.9 MEQ/L (ref 3.5–5.2)
RBC # BLD: 4.85 MILL/MM3 (ref 4.7–6.1)
SARS-COV-2, NAAT: NOT DETECTED
SODIUM BLD-SCNC: 134 MEQ/L (ref 135–145)
SOURCE, BLOOD GAS: ABNORMAL
TROPONIN T: < 0.01 NG/ML
WBC # BLD: 10.5 THOU/MM3 (ref 4.8–10.8)

## 2020-06-19 PROCEDURE — 82803 BLOOD GASES ANY COMBINATION: CPT

## 2020-06-19 PROCEDURE — 3430000000 HC RX DIAGNOSTIC RADIOPHARMACEUTICAL: Performed by: FAMILY MEDICINE

## 2020-06-19 PROCEDURE — 2580000003 HC RX 258: Performed by: INTERNAL MEDICINE

## 2020-06-19 PROCEDURE — 6370000000 HC RX 637 (ALT 250 FOR IP): Performed by: FAMILY MEDICINE

## 2020-06-19 PROCEDURE — 6370000000 HC RX 637 (ALT 250 FOR IP): Performed by: INTERNAL MEDICINE

## 2020-06-19 PROCEDURE — A9500 TC99M SESTAMIBI: HCPCS | Performed by: FAMILY MEDICINE

## 2020-06-19 PROCEDURE — 6360000004 HC RX CONTRAST MEDICATION: Performed by: FAMILY MEDICINE

## 2020-06-19 PROCEDURE — 36600 WITHDRAWAL OF ARTERIAL BLOOD: CPT

## 2020-06-19 PROCEDURE — 2700000000 HC OXYGEN THERAPY PER DAY

## 2020-06-19 PROCEDURE — 99225 PR SBSQ OBSERVATION CARE/DAY 25 MINUTES: CPT | Performed by: FAMILY MEDICINE

## 2020-06-19 PROCEDURE — G0378 HOSPITAL OBSERVATION PER HR: HCPCS

## 2020-06-19 PROCEDURE — 6360000002 HC RX W HCPCS

## 2020-06-19 PROCEDURE — 99223 1ST HOSP IP/OBS HIGH 75: CPT | Performed by: INTERNAL MEDICINE

## 2020-06-19 PROCEDURE — 78452 HT MUSCLE IMAGE SPECT MULT: CPT

## 2020-06-19 PROCEDURE — 2580000003 HC RX 258: Performed by: FAMILY MEDICINE

## 2020-06-19 PROCEDURE — 84484 ASSAY OF TROPONIN QUANT: CPT

## 2020-06-19 PROCEDURE — 96372 THER/PROPH/DIAG INJ SC/IM: CPT

## 2020-06-19 PROCEDURE — U0002 COVID-19 LAB TEST NON-CDC: HCPCS

## 2020-06-19 PROCEDURE — 96361 HYDRATE IV INFUSION ADD-ON: CPT

## 2020-06-19 PROCEDURE — 96374 THER/PROPH/DIAG INJ IV PUSH: CPT

## 2020-06-19 PROCEDURE — 36415 COLL VENOUS BLD VENIPUNCTURE: CPT

## 2020-06-19 PROCEDURE — 6360000002 HC RX W HCPCS: Performed by: INTERNAL MEDICINE

## 2020-06-19 PROCEDURE — 83605 ASSAY OF LACTIC ACID: CPT

## 2020-06-19 PROCEDURE — 93017 CV STRESS TEST TRACING ONLY: CPT | Performed by: INTERNAL MEDICINE

## 2020-06-19 PROCEDURE — 85027 COMPLETE CBC AUTOMATED: CPT

## 2020-06-19 PROCEDURE — 80048 BASIC METABOLIC PNL TOTAL CA: CPT

## 2020-06-19 PROCEDURE — 71275 CT ANGIOGRAPHY CHEST: CPT

## 2020-06-19 PROCEDURE — 94760 N-INVAS EAR/PLS OXIMETRY 1: CPT

## 2020-06-19 PROCEDURE — 94640 AIRWAY INHALATION TREATMENT: CPT

## 2020-06-19 PROCEDURE — 82948 REAGENT STRIP/BLOOD GLUCOSE: CPT

## 2020-06-19 RX ORDER — SODIUM CHLORIDE 9 MG/ML
INJECTION, SOLUTION INTRAVENOUS CONTINUOUS
Status: DISCONTINUED | OUTPATIENT
Start: 2020-06-19 | End: 2020-06-20 | Stop reason: HOSPADM

## 2020-06-19 RX ORDER — METOPROLOL TARTRATE 5 MG/5ML
5 INJECTION INTRAVENOUS EVERY 6 HOURS PRN
Status: DISCONTINUED | OUTPATIENT
Start: 2020-06-19 | End: 2020-06-20 | Stop reason: HOSPADM

## 2020-06-19 RX ORDER — GUAIFENESIN 600 MG/1
600 TABLET, EXTENDED RELEASE ORAL 2 TIMES DAILY
Status: DISCONTINUED | OUTPATIENT
Start: 2020-06-19 | End: 2020-06-20 | Stop reason: HOSPADM

## 2020-06-19 RX ORDER — 0.9 % SODIUM CHLORIDE 0.9 %
500 INTRAVENOUS SOLUTION INTRAVENOUS ONCE
Status: COMPLETED | OUTPATIENT
Start: 2020-06-19 | End: 2020-06-19

## 2020-06-19 RX ORDER — IPRATROPIUM BROMIDE AND ALBUTEROL SULFATE 2.5; .5 MG/3ML; MG/3ML
1 SOLUTION RESPIRATORY (INHALATION) EVERY 4 HOURS PRN
Status: DISCONTINUED | OUTPATIENT
Start: 2020-06-19 | End: 2020-06-20 | Stop reason: HOSPADM

## 2020-06-19 RX ADMIN — LISINOPRIL 5 MG: 5 TABLET ORAL at 09:13

## 2020-06-19 RX ADMIN — INSULIN GLARGINE 85 UNITS: 100 INJECTION, SOLUTION SUBCUTANEOUS at 23:11

## 2020-06-19 RX ADMIN — SERTRALINE HYDROCHLORIDE 50 MG: 50 TABLET, FILM COATED ORAL at 09:13

## 2020-06-19 RX ADMIN — Medication 32.5 MILLICURIE: at 13:39

## 2020-06-19 RX ADMIN — ENOXAPARIN SODIUM 40 MG: 40 INJECTION SUBCUTANEOUS at 09:13

## 2020-06-19 RX ADMIN — METHYLPREDNISOLONE SODIUM SUCCINATE 40 MG: 40 INJECTION, POWDER, FOR SOLUTION INTRAMUSCULAR; INTRAVENOUS at 09:13

## 2020-06-19 RX ADMIN — Medication 10 ML: at 09:13

## 2020-06-19 RX ADMIN — INSULIN LISPRO 12 UNITS: 100 INJECTION, SOLUTION INTRAVENOUS; SUBCUTANEOUS at 16:55

## 2020-06-19 RX ADMIN — IPRATROPIUM BROMIDE AND ALBUTEROL SULFATE 1 AMPULE: .5; 3 SOLUTION RESPIRATORY (INHALATION) at 05:19

## 2020-06-19 RX ADMIN — GABAPENTIN 300 MG: 300 CAPSULE ORAL at 21:07

## 2020-06-19 RX ADMIN — Medication 10 MILLICURIE: at 12:42

## 2020-06-19 RX ADMIN — IOPAMIDOL 80 ML: 755 INJECTION, SOLUTION INTRAVENOUS at 22:38

## 2020-06-19 RX ADMIN — LEVOTHYROXINE SODIUM 50 MCG: 50 TABLET ORAL at 06:20

## 2020-06-19 RX ADMIN — IPRATROPIUM BROMIDE AND ALBUTEROL SULFATE 1 AMPULE: .5; 3 SOLUTION RESPIRATORY (INHALATION) at 10:35

## 2020-06-19 RX ADMIN — GABAPENTIN 300 MG: 300 CAPSULE ORAL at 16:55

## 2020-06-19 RX ADMIN — METOPROLOL TARTRATE 25 MG: 25 TABLET ORAL at 21:11

## 2020-06-19 RX ADMIN — INSULIN LISPRO 25 UNITS: 100 INJECTION, SOLUTION INTRAVENOUS; SUBCUTANEOUS at 18:21

## 2020-06-19 RX ADMIN — SODIUM CHLORIDE 500 ML: 9 INJECTION, SOLUTION INTRAVENOUS at 18:18

## 2020-06-19 RX ADMIN — SODIUM CHLORIDE: 9 INJECTION, SOLUTION INTRAVENOUS at 20:51

## 2020-06-19 RX ADMIN — GABAPENTIN 300 MG: 300 CAPSULE ORAL at 09:13

## 2020-06-19 ASSESSMENT — PAIN SCALES - GENERAL: PAINLEVEL_OUTOF10: 0

## 2020-06-19 NOTE — CONSULTS
[address] [id number]                                  CONSULTATION    PATIENT NAME: Niharika Addison                     :        1970  MED REC NO:   [id number]                           ROOM:       2827  ACCOUNT NO:   [de-identified]                           ADMIT DATE: 2020  PROVIDER:     Ata May. RICK Carson Multani:  2020    REASON OF CONSULTATION:  Worsening of shortness of breath and  palpitation with history of intermittent chest pain in the last several  weeks. HISTORY OF PRESENT ILLNESS:  This is a 15-year-old  gentleman  presented to the emergency room because of worsening of shortness of  breath over the last couple of days. The patient has been having  chronic shortness of breath, has been admitted for similar worsening of  shortness of breath over months ago and has been evaluated and  discharged, now again he has recurrence of worsening of shortness of  breath, and shortness of breath is in any form of activity. The patient  has above-the-knee amputation, wheelchair-bound, above-the-knee  amputation on the right side, and so his activity is limited to begin  with, and however, moving from wheelchair to bed and even moving on the  bed giving significant shortness of breath, but he has no orthopnea or  paroxysmal nocturnal dyspnea, and in view of this limiting shortness of  breath, he decided to come to the emergency room. The patient has  history of intermittent chest pain reported for several months, and  moreover, he has history of intermittent chest pain, atypical in nature,  not exertion induced, just comes on its own and goes in five minutes,  and it is a pressure, not radiating, around 5 to 6/10 and resolves on  its down, lasts only five minutes, happens once or twice in a month, and  has been having this for long time.   The patient denied having any  cardiac catheterization and no history of cardiac intervention or any  cardiac stent. So in view of this unexplained shortness of breath,  Cardiology evaluation was sought. The patient has cough, dry, but some  clear sputum at times, and he has no nausea or vomiting. He has no  fever. He has no weight gain, and he has no leg edema. REVIEW OF SYSTEMS:  Negative except for the above-mentioned ones. PAST MEDICAL HISTORY:  Includes the following, bipolar disorder, BPH,  diabetes, diabetic neuropathy and complicated diabetic, thyroid disease,  and above-the-knee amputation. PAST SURGICAL HISTORY:  [place] tooth extraction, endoscopy, toe  amputation, above-the-knee amputation at this time on the right,  cholecystectomy, and abscess drainage. FAMILY HISTORY:  No family history of premature coronary artery disease. Positive for early deaths, hypertension, and hypercholesterolemia. SOCIAL HISTORY:  Former smoker, quit in [address] [place] [address], after 10 pack years of  smoking. No alcohol. No drug use. ALLERGIES:  He is allergic to PENICILLIN, CLINDAMYCIN, ACTOS, and  VANCOMYCIN. HOME MEDICATIONS:  Prior to current admission include blood glucose  monitoring kit, Neurontin, NovoLog, Levemir, Synthroid, lisinopril,  Glucophage, Zofran, and Zoloft. PHYSICAL EXAMINATION:  GENERAL:  Looks sick, in no form of distress. VITAL SIGNS:  Blood pressure 131/60, pulse rate 88, respiratory rate 16,  temperature 97.9. HEENT:  Pink conjunctivae, anicteric sclerae. Pupils are equal and  reactive bilaterally to light. NECK:  No lymphadenopathy. No goiter. No JVD. CHEST:  Clear to auscultation and percussion. CARDIOVASCULAR:  Arteries are felt, carotid, femoral and pedal.  No  bruit. S1, S2 well heard. No murmur or gallop rhythm. ABDOMEN:  Soft, nontender, nondistended. Bowel sounds are positive. GENITOURINARY:  No CVA, flank, or suprapubic tenderness. LOCOMOTOR:  No cyanosis, clubbing, or muscle or joint tenderness.   SKIN:  No rashes, ulcers, or nodules. CNS:  Alert, awake. Oriented to time, person and place. Cranial nerves  intact. Sensation is intact to touch and pain. Motor, normal muscle  strength and tone. Appropriate mood and affect. WORKUP:  EKG:  Sinus tachycardia rate 112 beats per minute. No ST-T  abnormality actually. Basically, no significant change from previous  EKG. Chest x-ray:  No acute cardiopulmonary abnormality. He had been  admitted for similar situation a month ago, and so no changes, and he  had a CAT scan a month ago on 05/12/2020. No abnormality noted. No  pulmonary thromboembolism noted. He had an echocardiogram on 05/2020,  ejection fraction of 60%. He had a stress test in 2018 and no ischemia noted. Blood chemistry:   Sodium 134, potassium 3.9, BUN 11, creatinine 0.8. Troponin less than  0.01 x3 situations. Hematology:  White blood cell 10, hemoglobin 14,  platelet 186, and BNP is 10. ASSESSMENT:  1. Shortness of breath, basically unexplained the course of which is  not very clear, so probably the patient may benefit from pulmonary  function study on that line and once cardiac cause of short of breath  has been ruled out. He has history of smoking, and he has truncal  obesity. 2.  Hypertension. 3.  Diabetes with complication, neuropathy and diabetic foot abscess,  above-the-knee amputation on the right, wheelchair-bound. 4.  Hypothyroidism. 5.  Anxiety, depression, bipolar disorder. 6.  Above-the-knee amputation on the right related to diabetes. 7.  Wheelchair-bound. 8.  Palpitation, intermittent in the last several months. 9.  History of intermittent chest pain once in a while. PLAN AND RECOMMENDATION:  At this level, probably his echo within normal  limits. CT scan, chest x-ray, EKG, no acute abnormality. We will get a  functional study to rule out underlying ischemic process in view of  multiple risk factors, longstanding diabetes.   Moreover, the patient may  benefit from pulmonary function study to evaluate for any underlying  pulmonary situation or hyperactivity disorder and reactive airway  disorder/COPD or underlying asthmatic situation, so patient may benefit  from pulmonary function study that I will leave at the discretion of the  hospitalist, but from cardiac point of view, I think he has been worked  up, echo within normal limit, and so we will get functional study, and  in view of history of intermittent chest pain, palpitation, and  shortness of breath worsening, and moreover, we will put him on Holter  monitor, event monitor, Zio patch for two weeks on discharge in view of  the palpitation that patient is complaining. He is complaining of  intermittent palpitation, so _____ accordingly. Thank you for allowing to participate in the care of this patient. I  will follow up with you. Harjinder Valerio.  Artis Acuna M.D.    D: 06/19/2020 8:56:51       T: 06/19/2020 10:13:18     [email or web address]  Job#: [id number]     Doc#: [id number]    CC:

## 2020-06-19 NOTE — ED NOTES
ED to inpatient nurses report    Chief Complaint   Patient presents with    Shortness of Breath    Other     heart problems       Present to ED from home  LOC: alert and orientated to name, place, date  Vital signs   Vitals:    06/18/20 1745 06/18/20 1854 06/18/20 1926 06/18/20 2009   BP: (!) 150/75 132/73 120/67 125/67   Pulse: 114 111 113 115   Resp: 16 22 20 19   Temp: 98.6 °F (37 °C)      TempSrc: Oral      SpO2: 100% 99% 99% 99%   Weight: 222 lb (100.7 kg)      Height: 5' 6\" (1.676 m)         Oxygen Baseline 99%    Current needs required room air  Bipap/Cpap No  LDAs:   Peripheral IV 06/18/20 Right Antecubital (Active)   Site Assessment Clean;Dry; Intact 6/18/2020  8:09 PM   Line Status Normal saline locked 6/18/2020  8:09 PM   Dressing Status Clean;Dry; Intact 6/18/2020  8:09 PM   Dressing Intervention New 6/18/2020  5:59 PM     Mobility: Requires assistance * 1  Pending ED orders: none  Present condition: stable     Electronically signed by Mary Becerril RN on 6/18/2020 at 8:27 PM       Mary Becerril RN  06/18/20 2027

## 2020-06-19 NOTE — CARE COORDINATION
6/19/20, 7:09 AM EDT  DISCHARGE PLANNING EVALUATION:    Tiffanie Moreau       Admitted from: ED 6/18/2020/ [facility] day: 1   Location: [address] [place] [address]- Reason for admit: Shortness of breath [R06.02] Status: IP  Admit order signed?: yes  PMH:  has a past medical history of Bipolar 2 disorder ([address] BPH (benign prostatic hyperplasia), Diabetes mellitus type 2, uncontrolled ([address] Diabetic peripheral neuropathy ([address] Diabetic polyneuropathy ([address] Diabetic ulcer of right foot associated with type 2 diabetes mellitus ([address] Essential hypertension, GERD (gastroesophageal reflux disease), Hammer toe of left foot, Heart murmur, History of tobacco abuse, Hx of AKA (above knee amputation), right ([address] Macular edema, diabetic, bilateral ([address] Marijuana abuse in remission, Melena, MRSA (methicillin resistant staph aureus) culture positive, Onychomycosis, Other disorders of kidney and ureter in diseases classified elsewhere, Sleep apnea, Thyroid disease, and 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 Procedure:    06/19 Npo for [place] stress test    Pertinent abnormal Imaging:na    Medications:  Scheduled Meds:   gabapentin  300 mg Oral TID    lisinopril  5 mg Oral Daily    levothyroxine  50 mcg Oral Daily    insulin glargine  85 Units Subcutaneous BID    insulin lispro  25 Units Subcutaneous TID AC    sertraline  50 mg Oral Daily    sodium chloride flush  10 mL Intravenous 2 times per day    enoxaparin  40 mg Subcutaneous Daily    ipratropium-albuterol  1 ampule Inhalation Q6H    methylPREDNISolone  40 mg Intravenous Daily    insulin lispro  0-6 Units Subcutaneous TID WC    insulin lispro  0-3 Units Subcutaneous Nightly     Continuous Infusions:   dextrose        Pertinent Info/Orders/Treatment Plan:   To ED with SOB    Telemetry, cardiology consulted, diabetic management with ISS, Solu Medrol IV, med nebs, NPO,     Diet: DIET CARB CONTROL;   Smoking status:  reports that he quit smoking about 19 years ago. His smoking use included cigars. He started smoking about 35 years ago. He has a 10.00 pack-year smoking history. He has never used smokeless tobacco.   PCP: MARILYN López CNP  Readmission 30 days or less: no  Readmission Risk Score: 53%    Discharge Planning Evaluation  Current Residence:  Independent Living  Living Arrangements:  Friends   Support Systems:  Children, Friends/Neighbors  Current Services PTA:     Potential Assistance Needed:  N/A  Potential Assistance Purchasing Medications:  No  Does patient want to participate in local refill/ meds to beds program?  Yes  Type of Home Care Services:  None  Patient expects to be discharged to:  HOME  Expected Discharge date:  06/23/20  Follow Up Appointment: Best Day/ Time: Tuesday PM    Patient Goals/Plan/Treatment Preferences: Ade Car is from home; he resides at [facility]. He has standard wheelchair, has PCP, has prosthetic for RLE,. He follows at [facility] clinic for right index finger and left lower leg wound/Dr Dong Barber. Appt. made for  July 1, 2020. Declines needs or HH. He has F/U appt. With wound care clinic July 1  Transportation/Food Security/Housekeeping Addressed:  No issues identified.     Evaluation: no

## 2020-06-19 NOTE — TELEPHONE ENCOUNTER
Last visit- 6/11/2020  Next visit- 6/25/2020    Requested Prescriptions     Pending Prescriptions Disp Refills    insulin aspart (NOVOLOG FLEXPEN) 100 UNIT/ML injection pen 5 pen 3     Sig: Inject 25 Units into the skin 3 times daily (before meals)

## 2020-06-19 NOTE — PROGRESS NOTES
[facility] Utilization Review  Patient: Chapito Boudreaux  : 1970  Acct#: [de-identified]  2020 12:47 PM  ADMISSION DAY:  2020  5:40 PM   Case reviewed in referral from UR staff  43 yo gentleman with involved PMHx as per H&P. He was admitted with multiple mos. SOB/dyspnea that may have recently worsened. Lab is notable for mild hyponatremia that is not a new finding based on chart review, low TCO2 with nl anion gap, variable hyperglycemia and presenting glycosuria. SpO2 adequate, labs and VS otherwise acceptable. Noted is 2020 lactate elevation with nl range AG in pt on metformin. No evidence of acute cardiac injury by EKG or troponin. Further cardiac assessment in progress. (Prior echocardiography technically difficult w/o obvious abnormalities seen. ). CT chest April & May negative for findings suggesting vascular occlusion, emphysema or interstitial lung disease and LE duplex doppler studies also negative last month. I contacted Dr. Asha Ladd who responded immediately to inquiries. She is in agreement that this gentleman does not, at this time, appear to meet inpatient care criteria and will change to observation status. We reviewed the prior lactate elevation as a possible etiology of dyspnea and she will further pursue as she feels appropriate.     Thank you, Dr. Asha Ladd for your time & assistance in this case    Jose Raphael MD

## 2020-06-19 NOTE — ED NOTES
Updated pt on room assignment. Vs reassessed. Pt alert and respirations unlabored. Pt requested a diet pop. Gave pt diet pop.  Will continue to monitor      Otila Schilder, RN  06/18/20 2108

## 2020-06-19 NOTE — FLOWSHEET NOTE
06/19/20 1524   Encounter Summary   Services provided to: Patient   Referral/Consult From: [address] [facility] [address] [facility] [address] [employer] Completed   Continue Visiting Yes  (6/19/20 continue support)   Complexity of Encounter Moderate   Length of Encounter 15 minutes   Spiritual Assessment Completed Yes   [facility] PROGRESS NOTE      Patient: Abdulkadir Bolton  Room #: 6X-48/481-T            YOB: 1970  Age: 44 y.o. Gender: male            Admit Date & Time: 6/18/2020  5:40 PM    Assessment:  Pt is a 44years old male. Problems: chest pain, and high heart rate according to patient. Patient said he had stress test and is under observation. Patient is a member of \"[employer] [facility] [employer]\" in [facility]. Interventions:  I provided emotional support and nurtured hope. Offered words of encouragement to patient to trust his bety in God and do his best. I also assured patient that [facility] will do the best to provide excellence care to help him get well. Outcomes:  Patient is hopeful and believe that he will get well and join his friends at his group home here in [facility]. Plan:  (Is there more work to be done regarding the spiritual needs identified? If so, provide details about your suggested plan for follow-up. DELETE THIS parathetical guide prior to filing!)  1. [place] will follow with continue support for patient.      Electronically signed by Douglas Amador on 6/19/2020 at 6:59 PM.  [address] [facility]  [phone]

## 2020-06-19 NOTE — CARE COORDINATION
13:39 pm- Informed Daryle Sage was backed out to Observation status by Leo Conner from , patient off floor now at stress testing. [address] explained and given to Daryle Sage; patient voiced understanding. He hopes to be discharged after stress test results are back.

## 2020-06-19 NOTE — PROGRESS NOTES
Hospitalist Progress Note    Patient:  Emy Cuba      Unit/Bed:8B-35/035-A    YOB: 1970    MRN: [id number]       Acct: [de-identified]     PCP: MARILYN Chi CNP    Date of Admission: 6/18/2020    Chief Complaint: Shortness of Breath and Other (heart problems )      Hospital Course:     Please see H/P for details. In summary, this is a 44 y.o. L, with past medical history of DM type II, hypertension, bipolar disorder, BPH, history of right AKA, history of MRSA, hypothyroidism, who presented to [facility] on 6/18/2020 due to shortness of breath, palpitations and cough. Patient reports that his been having dry and sometimes productive cough for the past 3 weeks, accompanied by shortness of breath both at rest and on exertion and also reports palpitations on exertion. Denies chest pain. Patient states that he felt his heart is fluttering. He denies alcohol and illicit drug use. He admits he used to smoke half pack per day for the past 10 years, quit roughly 13 years ago. In ER, vital signs temperature 98.6 °F, RR 16, pulse rate 114, /75. EKG sinus tachycardia, rate 112. Chest x-ray no evidence of acute process. Of note, patient was just discharged from [facility] on 5/14/2020 for right hand fifth digit cellulitis. Patient was admitted under hospital medicine service for \"shortness of breath likely due to COPD, however cannot rule out cardiac causes\". Patient was started on Solu-Medrol 40 mg IV daily. Cardiology was consulted. Cardiology evaluated the patient who recommends stress test.  Patient had stress test on 6/19/2020, pending report. Subjective:     Patient seen and examined. Patient denies chest pain right now. He does report shortness of breath. He reports that he is still coughing, dry cough. He denies fever and chills, although he states that he is having night sweats.   He reports still having palpitations on exertion. He denies sick contact or exposure to patients with or suspected COVID 19. Medications:  Reviewed    Infusion Medications    sodium chloride      dextrose       Scheduled Medications    insulin lispro  0-18 Units Subcutaneous TID WC    insulin lispro  0-9 Units Subcutaneous Nightly    sodium chloride  500 mL Intravenous Once    gabapentin  300 mg Oral TID    lisinopril  5 mg Oral Daily    levothyroxine  50 mcg Oral Daily    insulin glargine  85 Units Subcutaneous BID    insulin lispro  25 Units Subcutaneous TID AC    sertraline  50 mg Oral Daily    sodium chloride flush  10 mL Intravenous 2 times per day    enoxaparin  40 mg Subcutaneous Daily    ipratropium-albuterol  1 ampule Inhalation Q6H    methylPREDNISolone  40 mg Intravenous Daily     PRN Meds: metoprolol, sodium chloride flush, acetaminophen **OR** acetaminophen, polyethylene glycol, promethazine **OR** ondansetron, glucose, dextrose, glucagon (rDNA), dextrose      Intake/Output Summary (Last 24 hours) at 6/19/2020 1610  Last data filed at 6/19/2020 0913  Gross per 24 hour   Intake 410 ml   Output --   Net 410 ml       Diet:  DIET CARB CONTROL; Carb Control: 3 carb choices (45 gms)/meal    Exam:  /68   Pulse 118   Temp 97.9 °F (36.6 °C) (Oral)   Resp 18   Ht 5' 6\" (1.676 m)   Wt 222 lb (100.7 kg)   SpO2 96%   BMI 35.83 kg/m²     General appearance: alert, not in acute distress  HEENT: Pupils equal, round, and reactive to light. Conjunctivae clear. Clear oral mucosa. Neck: Supple, with full range of motion. No jugular venous distention. Trachea midline. Respiratory: On 1 L/min O2 via NC. No rales, no wheezing, no rhonchi. Cardiovascular: tachycardic, regular rhythm with normal S1/S2 without murmurs, rubs or gallops.   Abdomen: Soft, non-tender, non-distended   Musculoskeletal: (+) right AKA  Exam of extremities: peripheral pulses normal, no pedal edema, 1+ pitting edema on left leg ( from mid to distal leg), no clubbing or cyanosis. Labs:   Recent Labs     06/18/20  1800 06/19/20  0536   WBC 11.1* 10.5   HGB 15.8 14.4   HCT 46.1 43.2    189     Recent Labs     06/18/20  1800 06/19/20  0536    134*   K 3.6 3.9   CL 99 102   CO2 24 21*   BUN 12 11   CREATININE 0.7 0.8   CALCIUM 9.4 9.0     Recent Labs     06/18/20  1800   AST 35   ALT 28   BILIDIR <0.2   BILITOT 0.3   ALKPHOS 76     No results for input(s): INR in the last 72 hours. No results for input(s): Vallarie Brunette in the last 72 hours. Urinalysis:      Lab Results   Component Value Date    NITRU NEGATIVE 06/18/2020    WBCUA 0-2 06/18/2020    BACTERIA NONE SEEN 06/18/2020    RBCUA 0-2 06/18/2020    BLOODU NEGATIVE 06/18/2020    SPECGRAV >1.030 06/06/2019    GLUCOSEU >= 1000 06/18/2020       Radiology:  XR CHEST PORTABLE   Final Result   Stable radiographic appearance of the chest. No evidence of an acute process. **This report has been created using voice recognition software. It may contain minor errors which are inherent in voice recognition technology. **      Final report electronically signed by Dr. Juan Winkler MD on 6/18/2020 6:11 PM      CTA CHEST W CONTRAST    (Results Pending)         Assessment/Plan:      Dyspnea, etiology unclear    -Accompanied by cough and palpitations  -Chest x-ray no acute findings   -EKG showed sinus tachycardia  -Troponin negative x3  -proBNP normal  -Echocardiogram on 5/12/2020 showed EF of 60%, overall left ventricular function normal  -Although d-dimer negative on arrival, patient persistently having tachycardia and shortness of breath, will get chest CTA to rule out PE  -Did not appreciate any wheezing, doubt patient has COPD exacerbation, and patient reports that he was not diagnosed with COPD in the past, although it is not unlikely that the patient has COPD given history of former smoker, however, clinically does not look like having COPD exacerbation.   Will DC Solu-Medrol. Tachycardia, etiology unclear    -Heart rate up to 120s on telemetry  -Chest CTA to rule out PE  -TSH normal  -Start Lopressor oral as ordered  -Start Lopressor IV as needed  -Continue telemetry    Lactic acidosis, chronic    -Patient has lactic acidosis since September 2019, although worse today. Patient takes metformin, currently on hold. Metformin should discontinue on discharge.  -Start IV fluids  -Check lactic acid every 6 hours  -ABG    Cough, likely URI    -Afebrile  -Chest x-ray with no acute findings  -COVID-19 negative  -Start Mucinex      Mild hyponatremia    -Sodium of 134  -Monitor for now  -BMP in a.m. Leukocytosis, likely reactive, resolved    -afebrile    Non-anion gap metabolic acidosis, mild    -CO2 of 21, monitor for now.   BMP in a.m.    DM type II, uncontrolled    -A1c of 8.3% in March 2020, was 11.4% in December 2019  -POC glucose ranging 220s to 550s  -Continue Lantus, sliding scale insulin and lispro 3 times daily with meals  -Accu-Chek  -Low carb diet    Hypothyroidism    -TSH at goal  -Continue levothyroxine    Essential hypertension    -Continue lisinopril    Anxiety and depression    -Continue Zoloft      Anticipated Discharge in : Pending        Diet: DIET CARB CONTROL; Carb Control: 3 carb choices (45 gms)/meal    DVT prophylaxis: [x] Lovenox                                 [] SCDs                                 [] SQ Heparin                                 [] Encourage ambulation           [] Already on Anticoagulation     Disposition:    [] Home       [] TCU       [] Rehab       [] Psych       [] SNF       [] [place]       [x] Other-Plan as above        Code Status: Full Code        Electronically signed by Jamey Padilla MD on 6/19/2020 at 4:17 PM

## 2020-06-19 NOTE — H&P
History & Physical        Patient:  Ana Rosa Bailey  YOB: 1970    MRN: [id number]     Acct: [de-identified]    PCP: MARILYN Garrison CNP    Date of Admission: 6/18/2020    Date of Service: Pt seen/examined on 6/18/2020   and Admitted to Inpatient with expected LOS greater than two midnights due to medical therapy. Chief Complaint:   Chief Complaint   Patient presents with    Shortness of Breath    Other     heart problems        History Of Present Illness:    .  44 y.o. male who presented to [address] [place] [address] with complaints of shortness of breath x several months, worsening over the course past 24 hours. Patient states that he is felt his heart fluttering. He says he has noted this for the past few weeks. Patient states that he does not have a cardiologist however he has been updating his PCP in regards to his symptoms. Patient states that his shortness of breath is worsened by changing position. He states nothing is helped with his breathing. Patient denies chest pain. Denies nausea, vomiting, diarrhea or constipation. Denies urinary complaints. Denies fever chills. Patient endorses being a diabetic. He denies illicit drugs, tobacco use or alcohol use. Patient hemodynamically stable while emergency department. Patient afebrile with temperature 98.6 °F.  Pulse sinus tach with a pulse ranging between 109 and 115. Labs and imaging were performed. CBC mild leukocytosis with a WBC 11,100, likely reactive. BMP and LFTs essentially normal findings. Glucose 89.  proBNP 10.5 and a troponin nonelevated. Chest x-ray nonacute. Patient to be admitted at this time for shortness of breath    Past Medical History:          Diagnosis Date    Bipolar 2 disorder [facility])     previously followed with Dr. Yuki Adams and Nel Fair in [facility] BPH (benign prostatic hyperplasia)     Diabetes mellitus [facility], uncontrolled [facility]     HgbA1c on 4/2/2019 was 9.1.     Diabetic peripheral neuropathy (HCC)     Diabetic polyneuropathy ([facility]     Diabetic ulcer of right foot associated with type 2 diabetes mellitus ([address] 12/10/2015    Essential hypertension     \"never been on b/p medication that I know of\"    GERD (gastroesophageal reflux disease)     Hammer toe of left foot     Heart murmur     denies any chest pain or palpitations    History of tobacco abuse     Hx of AKA (above knee amputation), right ([address] 04/18/2019    Dr. Lacey Smyth edema, diabetic, bilateral ([address] 05/04/2018    Dr. Allyn Vaca referred to retina specialist for 2nd opinion    Marijuana abuse in remission     Melena     MRSA (methicillin resistant staph aureus) culture positive     Onychomycosis     Other disorders of kidney and ureter in diseases classified elsewhere     Sleep apnea     no cpap    Thyroid disease     WD-Skin ulcer of fourth toe of right foot with necrosis of bone ([facility] 6/29/2016       Past Surgical History:          Procedure Laterality Date    ABSCESS DRAINAGE Right     foot    AMPUTATION ABOVE KNEE Right 4/18/2019    RIGHT ABOVE KNEE [facility] [address] [facility] , LAPAROSCOPIC N/A 4/1/2020    ROBOTIC CHOLECYSTECTOMY performed by Izabella Kimble MD at [address] [facility] [address] Right 07/01/2016    I & D    INCISION AND DRAINAGE Right 2/18/2019    I&D RIGHT STUMP performed by Agustin Miller MD at [address] [facility] [facility] Right 07/20/2016    LEG AMPUTATION BELOW KNEE Right 4/4/2019    I&D AND REVISION OF AMPUTATION RIGHT LEG performed by Agustin Miller MD at [place] [address] Right 1/14/15    sole of foot I&D    PA DRAIN INFECT SHOULDER BURSA Left 8/18/2017    LEFT SHOULDER INCISION AND DRAINAGE performed by Agustin Miller MD at [address] [facility] OFFICE/OUTPT [facility] Right 9/20/2018    EXCISIONAL DEBRIDEMENT RIGHT BKA STUMP performed by Khadijah Mata Josesito Cruz MD at [address] [place] [address] Right 1/16/15    2nd toe with wound vac applied    UPPER GASTROINTESTINAL ENDOSCOPY N/A 3/3/2020    EGD DILATION SAVORY performed by Jim Dill MD at [address] [place] [address]  ? when       Medications Prior to Admission:      Prior to Admission medications    Medication Sig Start Date End Date Taking? Authorizing Provider   metFORMIN (GLUCOPHAGE-XR) 500 MG extended release tablet Take 1 tablet by mouth daily (with breakfast) 6/11/20   MARILYN Shrestha - CNP   sertraline (ZOLOFT) 50 MG tablet Take 1 tablet by mouth daily 5/27/20   MARILYN Shrestha - CNP   levothyroxine (SYNTHROID) 50 MCG tablet Take 1 tablet by mouth daily 5/27/20   MARILYN Shrestha - CNP   gabapentin (NEURONTIN) 300 MG capsule Take 1 capsule by mouth 3 times daily for 30 days. 5/27/20 6/26/20  MARILYN Shrestha - CNP   lisinopril (PRINIVIL;ZESTRIL) 5 MG tablet Take 1 tablet by mouth daily 5/14/20   MARILYN Pereira CNP   insulin detemir (LEVEMIR FLEXTOUCH) 100 UNIT/ML injection pen Inject 85 Units into the skin 2 times daily 4/23/20   MARILYN Shrestha - CNP   insulin aspart (NOVOLOG FLEXPEN) 100 UNIT/ML injection pen Inject 25 Units into the skin 3 times daily (before meals) 4/23/20   MARILYN Shrestha - CNP   ondansetron (ZOFRAN) 4 MG tablet Take 1 tablet by mouth every 8 hours as needed for Nausea or Vomiting 3/24/20   Keysha Alicia MD   Lancets MISC Use to test blood sugars 4 times daily DX:E11.65 2/19/20   MARILYN Shrestha CNP   AMINO ACIDS COMPLEX PO Take 1 each by mouth daily Daily AA drink    Historical MD Kane   blood glucose monitor kit and supplies Accu Chek Sheila meter.  Use to test blood sugars DX:E11.65 10/8/19   MARILYN Shrestha CNP   blood glucose monitor strips Accucheck Sheila Test Strips Test blood sugars 4 times daily DX:E11.65 10/8/19   Leticia Maloney, MARILYN - CNP   Insulin Syringe-Needle U-100 29G X 1/2\" 0.3 ML MISC 1 each by Does not apply route 4 times daily (after meals and at bedtime) 4/30/19   Leo Ryan MD       Allergies:  Pcn [penicillins]; Clindamycin/lincomycin; Vancomycin; and Actos [pioglitazone]    Social History:      The patient currently lives at home    TOBACCO:   reports that he quit smoking about 19 years ago. His smoking use included cigars. He started smoking about 35 years ago. He has a 10.00 pack-year smoking history. He has never used smokeless tobacco.  ETOH:   reports previous alcohol use. Family History:      Reviewed in detail and negative for Cancer and CVA. Positive as follows:        Problem Relation Age of Onset    Diabetes Mother     Other Mother         pneumonia, H1N1    Depression Mother     Early Death Mother     High Blood Pressure Mother     High Cholesterol Mother     Vision Loss Maternal Grandmother     Arthritis Maternal Grandfather     Heart Disease Maternal Grandfather        Diet:  No diet orders on file    REVIEW OF SYSTEMS:   Pertinent positives as noted in the HPI. All other systems reviewed and negative. PHYSICAL EXAM:    /64   Pulse 109   Temp 98.6 °F (37 °C) (Oral)   Resp 20   Ht 5' 6\" (1.676 m)   Wt 222 lb (100.7 kg)   SpO2 96%   BMI 35.83 kg/m²     General appearance:  Noted apparent distress, appears stated age and cooperative. HEENT:  Normal cephalic, atraumatic without obvious deformity. Pupils equal, round, and reactive to light. Extra ocular muscles intact. Conjunctivae/corneas clear. Neck: Supple, with full range of motion. No jugular venous distention. Trachea midline. Respiratory: Increased respiratory effort. Decreased bases bilaterally  Cardiovascular: Pulse 112 with normal S1/S2 without rubs or gallops. Abdomen: Soft, non-tender, non-distended with normal bowel sounds. Musculoskeletal:  No clubbing, cyanosis or edema bilaterally. Full range of motion without deformity.   Skin: Skin color, texture, turgor normal.  No rashes or lesions. Neurologic:  Neurovascularly intact without any focal sensory/motor deficits. Cranial nerves: II-XII intact, grossly non-focal.  Psychiatric:  Alert and oriented, thought content appropriate, normal insight  Capillary Refill: Brisk,< 3 seconds   Peripheral Pulses: +2 palpable, equal bilaterally       Labs:     Recent Labs     06/18/20  1800   WBC 11.1*   HGB 15.8   HCT 46.1        Recent Labs     06/18/20  1800      K 3.6   CL 99   CO2 24   BUN 12   CREATININE 0.7   CALCIUM 9.4     Recent Labs     06/18/20  1800   AST 35   ALT 28   BILIDIR <0.2   BILITOT 0.3   ALKPHOS 76     No results for input(s): INR in the last 72 hours. No results for input(s): Amaryllis Goodell in the last 72 hours. Urinalysis:      Lab Results   Component Value Date    NITRU NEGATIVE 06/18/2020    WBCUA 0-2 06/18/2020    BACTERIA NONE SEEN 06/18/2020    RBCUA 0-2 06/18/2020    BLOODU NEGATIVE 06/18/2020    SPECGRAV >1.030 06/06/2019    GLUCOSEU >= 1000 06/18/2020       Radiology:       XR CHEST PORTABLE   Final Result   Stable radiographic appearance of the chest. No evidence of an acute process. **This report has been created using voice recognition software. It may contain minor errors which are inherent in voice recognition technology. **      Final report electronically signed by Dr. Jemma Turcios MD on 6/18/2020 6:11 PM               DVT prophylaxis: [] Lovenox                                 [x] SCDs                                 [] SQ Heparin                                 [] Encourage ambulation           [] Already on Anticoagulation    Code Status: Full Code      PT/OT Eval Status: Encouraged    Disposition:    [x] Home       [] TCU       [] Rehab       [] Psych       [] SNF       [] [facility]       [] Other-    ASSESSMENT:    C/Marlen Espinoza [address] Problems    Diagnosis Date Noted    Shortness of breath [R06.02] 06/18/2020  Hypothyroid [E03.9] 06/18/2020    Anxiety and depression [F41.9, F32.9] 06/18/2020    Essential hypertension [I10]     Uncontrolled type 2 diabetes mellitus with hyperglycemia, with long-term current use of insulin ([facility] [E11.65, Z79.4] 07/26/2017       PLAN:    Shortness of breath likely due to COPD, however cannot rule out cardiac causes  Admit to telemetry unit  IV fluid hydration with electrolyte replacement as needed  Telemetry monitor  DuoNebs as needed  Steroid therapy, Solu-Medrol 40 mg daily  Echo completed 2020  Cardiology consult, appreciate chart rec    Hypertension  Continue lisinopril 5 mg daily, home medication    Diabetes mellitus  BS and SSI    Hypothyroidism  Continue Synthroid 50 mcg daily, home medication    Anxiety depression  Continue Zoloft 50 mg daily, medication      Thank you MARILYN Richter - ALEJANDRO for the opportunity to be involved in this patient's care.     Electronically signed by Jacoby Lawson DO on 6/18/2020 at 8:59 PM

## 2020-06-20 VITALS
DIASTOLIC BLOOD PRESSURE: 70 MMHG | HEART RATE: 76 BPM | TEMPERATURE: 97.8 F | RESPIRATION RATE: 18 BRPM | SYSTOLIC BLOOD PRESSURE: 133 MMHG | WEIGHT: 222 LBS | BODY MASS INDEX: 35.68 KG/M2 | OXYGEN SATURATION: 100 % | HEIGHT: 66 IN

## 2020-06-20 LAB
ANION GAP SERPL CALCULATED.3IONS-SCNC: 12 MEQ/L (ref 8–16)
AVERAGE GLUCOSE: 276 MG/DL (ref 70–126)
BUN BLDV-MCNC: 18 MG/DL (ref 7–22)
CALCIUM SERPL-MCNC: 9 MG/DL (ref 8.5–10.5)
CHLORIDE BLD-SCNC: 100 MEQ/L (ref 98–111)
CO2: 22 MEQ/L (ref 23–33)
CREAT SERPL-MCNC: 0.7 MG/DL (ref 0.4–1.2)
EKG ATRIAL RATE: 81 BPM
EKG P AXIS: 32 DEGREES
EKG P-R INTERVAL: 200 MS
EKG Q-T INTERVAL: 384 MS
EKG QRS DURATION: 84 MS
EKG QTC CALCULATION (BAZETT): 446 MS
EKG T AXIS: 36 DEGREES
EKG VENTRICULAR RATE: 81 BPM
ERYTHROCYTE [DISTWIDTH] IN BLOOD BY AUTOMATED COUNT: 14.7 % (ref 11.5–14.5)
ERYTHROCYTE [DISTWIDTH] IN BLOOD BY AUTOMATED COUNT: 47.2 FL (ref 35–45)
GFR SERPL CREATININE-BSD FRML MDRD: > 90 ML/MIN/1.73M2
GLUCOSE BLD-MCNC: 238 MG/DL (ref 70–108)
GLUCOSE BLD-MCNC: 253 MG/DL (ref 70–108)
GLUCOSE BLD-MCNC: 280 MG/DL (ref 70–108)
GLUCOSE BLD-MCNC: 294 MG/DL (ref 70–108)
GLUCOSE BLD-MCNC: 303 MG/DL (ref 70–108)
HBA1C MFR BLD: 11.2 % (ref 4.4–6.4)
HCT VFR BLD CALC: 42 % (ref 42–52)
HEMOGLOBIN: 14 GM/DL (ref 14–18)
LACTIC ACID: 2.2 MMOL/L (ref 0.5–2.2)
LACTIC ACID: 3.7 MMOL/L (ref 0.5–2.2)
MAGNESIUM: 2.1 MG/DL (ref 1.6–2.4)
MCH RBC QN AUTO: 29.9 PG (ref 26–33)
MCHC RBC AUTO-ENTMCNC: 33.3 GM/DL (ref 32.2–35.5)
MCV RBC AUTO: 89.7 FL (ref 80–94)
PLATELET # BLD: 231 THOU/MM3 (ref 130–400)
PMV BLD AUTO: 9.7 FL (ref 9.4–12.4)
POTASSIUM SERPL-SCNC: 4.2 MEQ/L (ref 3.5–5.2)
RBC # BLD: 4.68 MILL/MM3 (ref 4.7–6.1)
SODIUM BLD-SCNC: 134 MEQ/L (ref 135–145)
TROPONIN T: < 0.01 NG/ML
WBC # BLD: 14.2 THOU/MM3 (ref 4.8–10.8)

## 2020-06-20 PROCEDURE — 85027 COMPLETE CBC AUTOMATED: CPT

## 2020-06-20 PROCEDURE — 93005 ELECTROCARDIOGRAM TRACING: CPT | Performed by: NURSE PRACTITIONER

## 2020-06-20 PROCEDURE — 0296T HC EXT ECG RECORDING 2-21 DAY HOOKUP: CPT

## 2020-06-20 PROCEDURE — 82948 REAGENT STRIP/BLOOD GLUCOSE: CPT

## 2020-06-20 PROCEDURE — 80048 BASIC METABOLIC PNL TOTAL CA: CPT

## 2020-06-20 PROCEDURE — 84484 ASSAY OF TROPONIN QUANT: CPT

## 2020-06-20 PROCEDURE — 6360000002 HC RX W HCPCS: Performed by: INTERNAL MEDICINE

## 2020-06-20 PROCEDURE — 36415 COLL VENOUS BLD VENIPUNCTURE: CPT

## 2020-06-20 PROCEDURE — 6370000000 HC RX 637 (ALT 250 FOR IP): Performed by: INTERNAL MEDICINE

## 2020-06-20 PROCEDURE — 83735 ASSAY OF MAGNESIUM: CPT

## 2020-06-20 PROCEDURE — G0378 HOSPITAL OBSERVATION PER HR: HCPCS

## 2020-06-20 PROCEDURE — 96372 THER/PROPH/DIAG INJ SC/IM: CPT

## 2020-06-20 PROCEDURE — 6370000000 HC RX 637 (ALT 250 FOR IP): Performed by: FAMILY MEDICINE

## 2020-06-20 PROCEDURE — 2580000003 HC RX 258: Performed by: FAMILY MEDICINE

## 2020-06-20 PROCEDURE — 83036 HEMOGLOBIN GLYCOSYLATED A1C: CPT

## 2020-06-20 PROCEDURE — 99217 PR OBSERVATION CARE DISCHARGE MANAGEMENT: CPT | Performed by: NURSE PRACTITIONER

## 2020-06-20 PROCEDURE — 83605 ASSAY OF LACTIC ACID: CPT

## 2020-06-20 RX ADMIN — GUAIFENESIN 600 MG: 600 TABLET, EXTENDED RELEASE ORAL at 08:35

## 2020-06-20 RX ADMIN — INSULIN LISPRO 25 UNITS: 100 INJECTION, SOLUTION INTRAVENOUS; SUBCUTANEOUS at 13:16

## 2020-06-20 RX ADMIN — METOPROLOL TARTRATE 25 MG: 25 TABLET ORAL at 08:34

## 2020-06-20 RX ADMIN — ENOXAPARIN SODIUM 40 MG: 40 INJECTION SUBCUTANEOUS at 08:35

## 2020-06-20 RX ADMIN — LEVOTHYROXINE SODIUM 50 MCG: 50 TABLET ORAL at 08:35

## 2020-06-20 RX ADMIN — LISINOPRIL 5 MG: 5 TABLET ORAL at 08:35

## 2020-06-20 RX ADMIN — INSULIN LISPRO 6 UNITS: 100 INJECTION, SOLUTION INTRAVENOUS; SUBCUTANEOUS at 09:43

## 2020-06-20 RX ADMIN — INSULIN GLARGINE 85 UNITS: 100 INJECTION, SOLUTION SUBCUTANEOUS at 09:42

## 2020-06-20 RX ADMIN — SERTRALINE HYDROCHLORIDE 50 MG: 50 TABLET, FILM COATED ORAL at 08:35

## 2020-06-20 RX ADMIN — INSULIN LISPRO 4 UNITS: 100 INJECTION, SOLUTION INTRAVENOUS; SUBCUTANEOUS at 13:16

## 2020-06-20 RX ADMIN — SODIUM CHLORIDE: 9 INJECTION, SOLUTION INTRAVENOUS at 08:35

## 2020-06-20 RX ADMIN — GABAPENTIN 300 MG: 300 CAPSULE ORAL at 13:18

## 2020-06-20 RX ADMIN — GABAPENTIN 300 MG: 300 CAPSULE ORAL at 08:35

## 2020-06-20 RX ADMIN — GUAIFENESIN 600 MG: 600 TABLET, EXTENDED RELEASE ORAL at 01:08

## 2020-06-20 RX ADMIN — INSULIN LISPRO 25 UNITS: 100 INJECTION, SOLUTION INTRAVENOUS; SUBCUTANEOUS at 09:43

## 2020-06-20 ASSESSMENT — PAIN DESCRIPTION - LOCATION: LOCATION: CHEST

## 2020-06-20 ASSESSMENT — PAIN DESCRIPTION - FREQUENCY: FREQUENCY: CONTINUOUS

## 2020-06-20 ASSESSMENT — PAIN DESCRIPTION - PAIN TYPE: TYPE: ACUTE PAIN

## 2020-06-20 ASSESSMENT — PAIN DESCRIPTION - PROGRESSION
CLINICAL_PROGRESSION: GRADUALLY WORSENING

## 2020-06-20 ASSESSMENT — PAIN DESCRIPTION - DESCRIPTORS: DESCRIPTORS: ACHING

## 2020-06-20 ASSESSMENT — PAIN SCALES - GENERAL: PAINLEVEL_OUTOF10: 4

## 2020-06-20 ASSESSMENT — PAIN - FUNCTIONAL ASSESSMENT: PAIN_FUNCTIONAL_ASSESSMENT: ACTIVITIES ARE NOT PREVENTED

## 2020-06-20 ASSESSMENT — PAIN DESCRIPTION - ONSET: ONSET: ON-GOING

## 2020-06-20 ASSESSMENT — PAIN DESCRIPTION - ORIENTATION: ORIENTATION: LOWER

## 2020-06-20 NOTE — PROGRESS NOTES
Discharge teaching and instructions for diagnosis/procedure of shortness of breath completed with patient using teachback method. AVS reviewed. Printed prescriptions given to patient. Patient voiced understanding regarding prescriptions, follow up appointments, and care of self at home. Discharged in a wheelchair to  home with support per friend.

## 2020-06-20 NOTE — PLAN OF CARE
Problem: Falls - Risk of:  Goal: Will remain free from falls  Description: Will remain free from falls  6/20/2020 1151 by Shaina Osman RN  Outcome: Ongoing  Note: No falls this shift. Falling star program and alarms in use. Patient uses call light appropriately. Problem: Falls - Risk of:  Goal: Absence of physical injury  Description: Absence of physical injury  6/20/2020 1151 by Shaina Osman RN  Outcome: Ongoing  Note: Patient free from falls. No injuries noted. Problem: Discharge Planning:  Goal: Discharged to appropriate level of care  Description: Discharged to appropriate level of care  6/20/2020 1151 by Shaina Osman RN  Outcome: Ongoing  Note: Patient planning discharge back to [facility] house. Patient denies need for further discharge planning. Problem: Breathing Pattern - Ineffective:  Goal: Ability to achieve and maintain a regular respiratory rate will improve  Description: Ability to achieve and maintain a regular respiratory rate will improve  6/20/2020 1151 by Shaina Osman RN  Outcome: Ongoing  Note: Respirations even and unlabored. Patient continues to complain of SOB. Problem: Serum Glucose Level - Abnormal:  Goal: Ability to maintain appropriate glucose levels has stabilized  Description: Ability to maintain appropriate glucose levels has stabilized  6/20/2020 1151 by Shaina Osman RN  Outcome: Ongoing  Note: Chems AC and HS. Insulin given as ordered. Problem: Pain:  Goal: Pain level will decrease  Description: Pain level will decrease  Outcome: Ongoing  Note: Patient with complaints of chest pain this AM. EKG negative. Problem: Pain:  Goal: Control of acute pain  Description: Control of acute pain  Outcome: Ongoing  Note: Patient states acute pain has been controlled without use of pain medications. Problem: Pain:  Goal: Control of chronic pain  Description: Control of chronic pain  Outcome: Ongoing  Note: Patient denies chronic pain when asked. Care plan reviewed with patient. Patient verbalizes understanding of the plan of care and contributes to goal setting.

## 2020-06-20 NOTE — PROGRESS NOTES
Spoke with Kailee Escoto, [address]Sobia Ramirez with cardiology who stated patient was ok to be discharged from cardiology standpoint.

## 2020-06-20 NOTE — PLAN OF CARE
Problem: Falls - Risk of:  Goal: Will remain free from falls  Description: Will remain free from falls  Outcome: Ongoing  Note: Patient absent of falls this shift, fall band intact, bed alarm set, falling star magnet in place. Problem: Falls - Risk of:  Goal: Absence of physical injury  Description: Absence of physical injury  Outcome: Ongoing  Note: Patient absent of physical injury this shift. Problem: Discharge Planning:  Goal: Discharged to appropriate level of care  Description: Discharged to appropriate level of care  Outcome: Ongoing  Note: Patient plans to return home at discharge. Problem: Serum Glucose Level - Abnormal:  Goal: Ability to maintain appropriate glucose levels has stabilized  Description: Ability to maintain appropriate glucose levels has stabilized  Outcome: Ongoing  Note: Chemsticks checked ac/hs. Diabetic medications given a prescribed. Care plan reviewed with patient. Patient verbalize understanding of the plan of care and contribute to goal setting.

## 2020-06-20 NOTE — DISCHARGE SUMMARY
Hospitalist Discharge Summary        Patient: Chapito Boudreaux  YOB: 1970  MRN: [id number]   Acct: [de-identified]    Primary Care Physician: MARILYN Galarza CNP    Admit date  6/18/2020    Discharge date:  6/20/2020 9:57 AM    Chief Complaint on presentation :-  Chest pain    Discharge Assessment and Plan:-     1. Viral bronchitis/URI: Accompanied by cough and palpitations. Chest x-ray negative for acute findings. EKG stable. Serial troponins all negative. proBNP normal.  Echocardiogram performed on 5/12/2020 demonstrated an EF of 60% with a normal overall left ventricular function. CTA of the chest effectively ruled out PE. Lung fields clear to auscultation. Patient responded well to respiratory interventions during stay. Encouraged to continue pulmonary exercises at discharge. Follow-up with PCP in 1 week. 2. Tachycardia: Resolved. CTA of the chest negative for PE.  TSH normal.  Patient responded well to oral Lopressor. 3. Chronic lactic acidosis: Patient has had chronic lactic acidosis since September 2019. Discontinue metformin at discharge. 4. Hyponatremia, mild: Stable. Sodium level 134 at discharge. 5. Leukocytosis: Resolved. Likely reactive. Patient afebrile at discharge. 6. Non-anion gap metabolic acidosis, mild: Stable. 7. Type 2 diabetes mellitus: Hemoglobin A1c 8.3 in March 2020, was previously 11.4% in December 2019. Blood sugar stable during stay, continue Lantus and diabetic regime at discharge. Discontinue metformin. Low-carb diet encouraged at discharge. Tighter glycemic control recommended at discharge. 8. Hypothyroidism: TSH at goal.  Continue levothyroxine at discharge. 9. Essential hypertension: History, stable. Currently normotensive. Continue lisinopril at discharge. 10. Anxiety/depression: History. Continue Zoloft. 11. Obesity: BMI 35.9.     Initial H and P and Hospital course:-  Carol Ann Zuñiga is a 47-year-old  male, reformed smoker, with a medical history of bipolar 2 disorder, BPH, type 2 diabetes mellitus, diabetic peripheral neuropathy, diabetic polyneuropathy, essential hypertension, GERD, hammertoe of the left foot, heart murmur, history of tobacco abuse, macular degeneration, marijuana abuse in remission, history of melena, onychomycosis, sleep apnea, thyroid disease, and obesity. Patient presented to [facility] ER on 6/18/2020 with complaints of shortness of breath, palpitations, and a cough. Patient reported that he had been having dry and sometimes productive cough over the past 3 weeks it was accompanied by shortness of breath both at rest and on exertion, also reporting palpitations on exertion. He denied any chest pain and stated that he felt as if his heart was fluttering. He denied any alcohol or illicit drug use and did admit that he used to smoke half a pack a day for the past 10 years, quitting roughly 13 years ago. While in the ER, patient remained hemodynamically stable with a slightly elevated heart rate at 114. EKG performed demonstrated sinus tachycardia with a rate of 112 and CXR demonstrated no evidence of an acute process. It is of note that the patient was just recently discharged from [facility] on 5/14/2020 after being seen and treated for right hand fifth digit cellulitis. The patient was admitted for shortness of breath, likely due to COPD, unable to rule out cardiac causes. The patient was started on Solu-Medrol 40 mg IV daily and cardiology was consulted. Cardiology evaluated the patient and recommended a stress test which was performed on 6/19/2020 which was negative for ischemia. The hospitalist service was contacted for further evaluation, treatment, and management. During the patient's stay, he was effectively worked up by the cardiology team for chest pain which was proven to be negative.   Serial troponin readings continued to be negative, no changes were identified on EKG, chest x-ray did not demonstrate any acute processes, and lab work remained stable. At the time of discharge, the patient appears well and is alert and oriented to person, place, time, and situation. He currently denies any chest pain, shortness of breath, nausea, vomiting, abdominal pain, diarrhea, constipation, urinary complaints, lightheadedness, dizziness, headaches, change in vision, fever, or chills. He is updated on the discharge plan of care, the importance of following up with his PCP as well as cardiology, verbalizes understanding of the discharge instructions and had no other needs or questions at this time. Physical Exam:-  Vitals:   Patient Vitals for the past 24 hrs:   BP Temp Temp src Pulse Resp SpO2   06/20/20 0827 128/79 97.5 °F (36.4 °C) Oral 80 18 98 %   06/20/20 0408 121/69 97.5 °F (36.4 °C) Oral 88 16 95 %   06/20/20 0012 128/72 -- -- 98 16 100 %   06/19/20 2054 (!) 117/90 98 °F (36.7 °C) Oral 119 16 98 %   06/19/20 1951 -- -- -- -- 18 97 %   06/19/20 1612 118/68 97.9 °F (36.6 °C) Oral 118 18 96 %   06/19/20 1103 (!) 129/59 97.7 °F (36.5 °C) Oral 103 18 99 %     Weight:   Weight: 222 lb (100.7 kg)   24 hour intake/output:     Intake/Output Summary (Last 24 hours) at 6/20/2020 0957  Last data filed at 6/20/2020 0408  Gross per 24 hour   Intake 559.81 ml   Output 1500 ml   Net -940.19 ml       1. General appearance: No apparent distress, appears stated age and cooperative. 2. HEENT: Normal cephalic, atraumatic without obvious deformity. Pupils equal, round, and reactive to light. Extra ocular muscles intact. Conjunctivae/corneas clear. 3. Neck: Supple, with full range of motion. No jugular venous distention. Trachea midline. 4. Respiratory:  Normal respiratory effort. Clear to auscultation, bilaterally without Rales/Wheezes/Rhonchi. 5. Cardiovascular: Regular rate and rhythm with normal S1/S2 without murmurs, rubs or gallops.   6. Abdomen: Soft, non-tender, non-distended with normal bowel sounds. 7. Musculoskeletal:  No clubbing, cyanosis or edema bilaterally. 8. Skin: Skin color, texture, turgor normal.  No rashes or lesions. 9. Neurologic:  Neurovascularly intact without any focal sensory/motor deficits. Cranial nerves: II-XII intact, grossly non-focal.  10. Psychiatric: Alert and oriented, thought content appropriate, normal insight  11. Capillary Refill: Brisk,< 3 seconds   12. Peripheral Pulses: +2 palpable, equal bilaterally    Discharge Medications:-      Medication List      STOP taking these medications    dicyclomine 10 MG capsule  Commonly known as:  BENTYL     doxycycline hyclate 100 MG capsule  Commonly known as:  VIBRAMYCIN     pantoprazole 40 MG tablet  Commonly known as:  PROTONIX     sucralfate 1 GM tablet  Commonly known as:  [address] [facility]    AMINO ACIDS COMPLEX PO     blood glucose monitor kit and supplies  Accu Chek Sheila meter. Use to test blood sugars DX:E11.65     blood glucose test strips  Accucheck Sheila Test Strips Test blood sugars 4 times daily DX:E11.65     gabapentin 300 MG capsule  Commonly known as:  NEURONTIN  Take 1 capsule by mouth 3 times daily for 30 days.      Insulin Syringe-Needle U-100 29G X 1/2\" 0.3 ML Misc  1 each by Does not apply route 4 times daily (after meals and at bedtime)     Lancets Misc  Use to test blood sugars 4 times daily DX:E11.65     Levemir FlexTouch 100 UNIT/ML injection pen  Generic drug:  insulin detemir  Inject 85 Units into the skin 2 times daily     levothyroxine 50 MCG tablet  Commonly known as:  SYNTHROID  Take 1 tablet by mouth daily     lisinopril 5 MG tablet  Commonly known as:  PRINIVIL;ZESTRIL  Take 1 tablet by mouth daily     metFORMIN 500 MG extended release tablet  Commonly known as:  GLUCOPHAGE-XR  Take 1 tablet by mouth daily (with breakfast)     NovoLOG FlexPen 100 UNIT/ML injection pen  Generic drug:  insulin aspart  Inject 25 Units into the skin 3 times daily (before meals)     ondansetron 4 MG tablet  Commonly known as:  Zofran  Take 1 tablet by mouth every 8 hours as needed for Nausea or Vomiting     sertraline 50 MG tablet  Commonly known as:  ZOLOFT  Take 1 tablet by mouth daily             Labs :-  Recent Results (from the past 72 hour(s))   EKG 12 Lead    Collection Time: 06/18/20  5:46 PM   Result Value Ref Range    Ventricular Rate 112 BPM    Atrial Rate 112 BPM    P-R Interval 158 ms    QRS Duration 80 ms    Q-T Interval 364 ms    QTc Calculation (Bazett) 496 ms    P Axis 42 degrees    R Axis 25 degrees    T Axis 51 degrees   Basic Metabolic Panel w/ Reflex to MG    Collection Time: 06/18/20  6:00 PM   Result Value Ref Range    Sodium 138 135 - 145 meq/L    Potassium reflex Magnesium 3.6 3.5 - 5.2 meq/L    Chloride 99 98 - 111 meq/L    CO2 24 23 - 33 meq/L    Glucose 89 70 - 108 mg/dL    BUN 12 7 - 22 mg/dL    CREATININE 0.7 0.4 - 1.2 mg/dL    Calcium 9.4 8.5 - 10.5 mg/dL   CBC Auto Differential    Collection Time: 06/18/20  6:00 PM   Result Value Ref Range    WBC 11.1 (H) 4.8 - 10.8 thou/mm3    RBC 5.32 4.70 - 6.10 mill/mm3    Hemoglobin 15.8 14.0 - 18.0 gm/dl    Hematocrit 46.1 42.0 - 52.0 %    MCV 86.7 80.0 - 94.0 fL    MCH 29.7 26.0 - 33.0 pg    MCHC 34.3 32.2 - 35.5 gm/dl    RDW-CV 14.1 11.5 - 14.5 %    RDW-SD 44.2 35.0 - 45.0 fL    Platelets 992 166 - 464 thou/mm3    MPV 9.5 9.4 - 12.4 fL    Seg Neutrophils 66.7 %    Lymphocytes 23.9 %    Monocytes 5.7 %    Eosinophils 2.8 %    Basophils 0.4 %    Immature Granulocytes 0.5 %    Segs Absolute 7.4 1.8 - 7.7 thou/mm3    Lymphocytes Absolute 2.7 1.0 - 4.8 thou/mm3    Monocytes Absolute 0.6 0.4 - 1.3 thou/mm3    Eosinophils Absolute 0.3 0.0 - 0.4 thou/mm3    Basophils Absolute 0.0 0.0 - 0.1 thou/mm3    Immature Grans (Abs) 0.06 0.00 - 0.07 thou/mm3    nRBC 0 /100 wbc   Hepatic Function Panel    Collection Time: 06/18/20  6:00 PM   Result Value Ref Range    Alb 4.1 3.5 - 5.1 g/dL    Total Bilirubin 0.3 0.3 - 1.2 mg/dL    Bilirubin, Direct <0.2 0.0 - 0.3 mg/dL    Alkaline Phosphatase 76 38 - 126 U/L    AST 35 5 - 40 U/L    ALT 28 11 - 66 U/L    Total Protein 7.8 6.1 - 8.0 g/dL   Troponin    Collection Time: 06/18/20  6:00 PM   Result Value Ref Range    Troponin T < 0.010 ng/ml   Brain Natriuretic Peptide    Collection Time: 06/18/20  6:00 PM   Result Value Ref Range    Pro-BNP 10.5 0.0 - 900.0 pg/mL   TSH without Reflex    Collection Time: 06/18/20  6:00 PM   Result Value Ref Range    TSH 2.130 0.400 - 4.20 uIU/mL   D-Dimer, Quantitative    Collection Time: 06/18/20  6:00 PM   Result Value Ref Range    D-Dimer, Quant 293.00 0.00 - 500.0 ng/ml FEU   Anion Gap    Collection Time: 06/18/20  6:00 PM   Result Value Ref Range    Anion Gap 15.0 8.0 - 16.0 meq/L   Glomerular Filtration Rate, Estimated    Collection Time: 06/18/20  6:00 PM   Result Value Ref Range    Est, Glom Filt Rate >90 ml/min/1.73m2   Osmolality    Collection Time: 06/18/20  6:00 PM   Result Value Ref Range    Osmolality Calc 274.9 (L) 275.0 - 300 mOsmol/kg   Urine with Reflexed Micro    Collection Time: 06/18/20  7:30 PM   Result Value Ref Range    Glucose, Ur >= 1000 (A) NEGATIVE mg/dl    Bilirubin Urine NEGATIVE NEGATIVE    Ketones, Urine NEGATIVE NEGATIVE    Specific Gravity, Urine > 1.030 (A) 1.002 - 1.03    Blood, Urine NEGATIVE NEGATIVE    pH, UA 6.5 5.0 - 9.0    Protein, UA TRACE (A) NEGATIVE    Urobilinogen, Urine 1.0 0.0 - 1.0 eu/dl    Nitrite, Urine NEGATIVE NEGATIVE    Leukocyte Esterase, Urine NEGATIVE NEGATIVE    Color, UA YELLOW STRAW-YELL    Character, Urine CLEAR CLEAR-SL C    RBC, UA 0-2 0-2/hpf /hpf    WBC, UA 0-2 0-4/hpf /hpf    Epithelial Cells, UA NONE SEEN 3-5/hpf /hpf    Bacteria, UA NONE SEEN FEW/NONE S /hpf    Casts UA NONE SEEN NONE SEEN /lpf    Crystals, UA NONE SEEN NONE SEEN    Renal Epithelial, UA NONE SEEN NONE SEEN    Yeast, UA NONE SEEN NONE SEEN    CASTS 2 NONE SEEN NONE SEEN /lpf    MISCELLANEOUS 2 NONE SEEN Troponin    Collection Time: 06/18/20 10:11 PM   Result Value Ref Range    Troponin T < 0.010 ng/ml   POCT Glucose    Collection Time: 06/18/20 10:46 PM   Result Value Ref Range    POC Glucose 229 (H) 70 - 108 mg/dl   CBC    Collection Time: 06/19/20  5:36 AM   Result Value Ref Range    WBC 10.5 4.8 - 10.8 thou/mm3    RBC 4.85 4.70 - 6.10 mill/mm3    Hemoglobin 14.4 14.0 - 18.0 gm/dl    Hematocrit 43.2 42.0 - 52.0 %    MCV 89.1 80.0 - 94.0 fL    MCH 29.7 26.0 - 33.0 pg    MCHC 33.3 32.2 - 35.5 gm/dl    RDW-CV 14.4 11.5 - 14.5 %    RDW-SD 46.3 (H) 35.0 - 45.0 fL    Platelets 814 852 - 037 thou/mm3    MPV 9.5 9.4 - 12.4 fL   Basic Metabolic Panel    Collection Time: 06/19/20  5:36 AM   Result Value Ref Range    Sodium 134 (L) 135 - 145 meq/L    Potassium 3.9 3.5 - 5.2 meq/L    Chloride 102 98 - 111 meq/L    CO2 21 (L) 23 - 33 meq/L    Glucose 169 (H) 70 - 108 mg/dL    BUN 11 7 - 22 mg/dL    CREATININE 0.8 0.4 - 1.2 mg/dL    Calcium 9.0 8.5 - 10.5 mg/dL   Troponin    Collection Time: 06/19/20  5:36 AM   Result Value Ref Range    Troponin T < 0.010 ng/ml   Anion Gap    Collection Time: 06/19/20  5:36 AM   Result Value Ref Range    Anion Gap 11.0 8.0 - 16.0 meq/L   Glomerular Filtration Rate, Estimated    Collection Time: 06/19/20  5:36 AM   Result Value Ref Range    Est, Glom Filt Rate >90 ml/min/1.73m2   POCT Glucose    Collection Time: 06/19/20  9:10 AM   Result Value Ref Range    POC Glucose 235 (H) 70 - 108 mg/dl   POCT Glucose    Collection Time: 06/19/20 11:01 AM   Result Value Ref Range    POC Glucose 365 (H) 70 - 108 mg/dl   Lactic Acid, Plasma    Collection Time: 06/19/20  2:26 PM   Result Value Ref Range    Lactic Acid 5.4 (H) 0.5 - 2.2 mmol/L   POCT Glucose    Collection Time: 06/19/20  4:10 PM   Result Value Ref Range    POC Glucose 515 (HH) 70 - 108 mg/dl   COVID-19    Collection Time: 06/19/20  5:15 PM   Result Value Ref Range    SARS-CoV-2, NAAT NOT DETECTED NOT DETECT   Blood Gas, Arterial ineffective in vitro for at least one of culture isolates. LABAERO  05/12/2020     moderate growth Isolates of beta hemolytic Streptococcus are sensitive to penicillin. Contact microbiology if erythromycin and/or clindamycin testing is necessary. LABAERO  05/12/2020     moderate growth This is a MRSA (Methicillin Resistant Staphylococcus aureus)isolate. Isolates of MRSA (ORSA) Methicillin (Oxacillin) Resistant Staphylococcus aureus (coagulase positive) require patient be placed in CONTACT isolation. Methicillin(Oxacillin)resistant strains of staphylococci (MRSA)or(MRSE)should be considered resistant to all classes of cephalosporins, penems and beta-lactams. In the treatment of gram positive infections, GENTAMICINshould be CONSIDERED a SYNERGYSTIC agent ONLY. Ciprofloxacin and Levofloxacin, regardless of in vitrosensitivity, should not be used for staphylococcal infectionsother than uncomplicated lower UTIs. Lab Results   Component Value Date    LABANAE  05/12/2020     No anaerobes isolated- preliminary Culture yielded moderate mixed growth which included anaerobic gram negative bacilli. If a true mixed aerobic and anaerobic infection is suspected, then broad spectrum empiric antibiotic therapy is indicated and should include coverage for anaerobic organisms. Urinalysis:      Lab Results   Component Value Date    NITRU NEGATIVE 06/18/2020    WBCUA 0-2 06/18/2020    BACTERIA NONE SEEN 06/18/2020    RBCUA 0-2 06/18/2020    BLOODU NEGATIVE 06/18/2020    SPECGRAV >1.030 06/06/2019    GLUCOSEU >= 1000 06/18/2020       Radiology:-  Cta Chest W Wo Contrast    Result Date: 6/19/2020  PROCEDURE: CTA CHEST W WO CONTRAST CLINICAL INFORMATION: shortness of breath. COMPARISON: No prior study. TECHNIQUE: 3 mm axial images were obtained through the chest after the administration of IV contrast.  A non-contrast localizer was obtained.   3D reconstructions were performed on the scanner to include MIP images through the right and left pulmonary arteries and sagittal images through the chest. Isovue was the intravenous contrast utilized. All CT scans at this facility use dose modulation, iterative reconstruction, and/or weight-based dosing when appropriate to reduce radiation dose to as low as reasonably achievable. FINDINGS: There is adequate opacification of the pulmonary arterial system. No pulmonary emboli are present. The aorta is within acceptable limits. The heart size is normal. There is no pericardial or pleural effusion. There is no mediastinal, axillary or hilar adenopathy. The lungs are clear. There are no infiltrates. No suspicious osseous lesions are present. There are no suspicious findings in the imaged aspects of the upper abdomen. No pulmonary emboli or pulmonary infiltrates. **This report has been created using voice recognition software. It may contain minor errors which are inherent in voice recognition technology. ** Final report electronically signed by Dr. Roland Sicard on 6/19/2020 10:49 PM    Xr Chest Portable    Result Date: 6/18/2020  PROCEDURE: XR CHEST PORTABLE CLINICAL INFORMATION: palpitations. Chest pain and shortness of breath. COMPARISON: 5/17/2020. TECHNIQUE: AP upright view of the chest. FINDINGS: The lungs appear clear. The pulmonary vasculature and cardiac-based also what are within normal limits. Visualized osseous structures appear grossly intact. Stable radiographic appearance of the chest. No evidence of an acute process. **This report has been created using voice recognition software. It may contain minor errors which are inherent in voice recognition technology. ** Final report electronically signed by Dr. Kera Amin MD on 6/18/2020 6:11 PM    Follow-up scheduled after discharge :-    in 1-2 weeks with MARILYN Singleton - CNP  in 1-2 weeks with Dr. Regina Hector.  Lydia Castillo MD    Consultations during this hospital stay:-  [] NONE [x] Cardiology  [] Nephrology  [] Hemo onco   [] GI   [] ID  [] Endocrine  [] Pulm    [] Neuro    [] Psych   [] Urology  [] ENT   [] G SURGERY   []Ortho    []CV surg    [] Palliative  [] Hospice [] Pain management   []    []TCU   [] PT/OT  OTHERS:-    Disposition: home  Condition at Discharge: Stable    Time Spent:- 25 minutes    Electronically signed by MARILYN Hawthorne CNP on 6/20/2020 at 9:57 AM  Discharging Hospitalist

## 2020-06-22 ENCOUNTER — CARE COORDINATION (OUTPATIENT)
Dept: CASE MANAGEMENT | Age: 50
End: 2020-06-22

## 2020-06-22 RX ORDER — INSULIN ASPART 100 [IU]/ML
25 INJECTION, SOLUTION INTRAVENOUS; SUBCUTANEOUS
Qty: 5 PEN | Refills: 3 | Status: SHIPPED | OUTPATIENT
Start: 2020-06-22 | End: 2020-07-06 | Stop reason: SDUPTHER

## 2020-06-25 ENCOUNTER — OFFICE VISIT (OUTPATIENT)
Dept: INTERNAL MEDICINE CLINIC | Age: 50
End: 2020-06-25
Payer: MEDICARE

## 2020-06-25 ENCOUNTER — HOSPITAL ENCOUNTER (EMERGENCY)
Age: 50
Discharge: HOME | End: 2020-06-25
Attending: EMERGENCY MEDICINE
Payer: MEDICARE

## 2020-06-25 VITALS
HEIGHT: 66 IN | HEART RATE: 94 BPM | OXYGEN SATURATION: 97 % | DIASTOLIC BLOOD PRESSURE: 76 MMHG | WEIGHT: 222 LBS | SYSTOLIC BLOOD PRESSURE: 147 MMHG | BODY MASS INDEX: 35.68 KG/M2 | RESPIRATION RATE: 20 BRPM | TEMPERATURE: 99.1 F

## 2020-06-25 VITALS
BODY MASS INDEX: 35.68 KG/M2 | TEMPERATURE: 98.1 F | WEIGHT: 222 LBS | HEIGHT: 66 IN | SYSTOLIC BLOOD PRESSURE: 139 MMHG | HEART RATE: 119 BPM | DIASTOLIC BLOOD PRESSURE: 61 MMHG

## 2020-06-25 LAB
ALBUMIN SERPL-MCNC: 3.6 G/DL (ref 3.5–5.1)
ALP BLD-CCNC: 84 U/L (ref 38–126)
ALT SERPL-CCNC: 23 U/L (ref 11–66)
ANION GAP SERPL CALCULATED.3IONS-SCNC: 13 MEQ/L (ref 8–16)
AST SERPL-CCNC: 32 U/L (ref 5–40)
BASOPHILS # BLD: 0.5 %
BASOPHILS ABSOLUTE: 0.1 THOU/MM3 (ref 0–0.1)
BILIRUB SERPL-MCNC: 0.5 MG/DL (ref 0.3–1.2)
BUN BLDV-MCNC: 9 MG/DL (ref 7–22)
CALCIUM SERPL-MCNC: 9.5 MG/DL (ref 8.5–10.5)
CHLORIDE BLD-SCNC: 103 MEQ/L (ref 98–111)
CHP ED QC CHECK: NORMAL
CO2: 25 MEQ/L (ref 23–33)
CREAT SERPL-MCNC: 0.7 MG/DL (ref 0.4–1.2)
EKG ATRIAL RATE: 112 BPM
EKG P AXIS: 43 DEGREES
EKG P-R INTERVAL: 160 MS
EKG Q-T INTERVAL: 360 MS
EKG QRS DURATION: 80 MS
EKG QTC CALCULATION (BAZETT): 491 MS
EKG R AXIS: 19 DEGREES
EKG T AXIS: 31 DEGREES
EKG VENTRICULAR RATE: 112 BPM
EOSINOPHIL # BLD: 2.6 %
EOSINOPHILS ABSOLUTE: 0.4 THOU/MM3 (ref 0–0.4)
ERYTHROCYTE [DISTWIDTH] IN BLOOD BY AUTOMATED COUNT: 14.5 % (ref 11.5–14.5)
ERYTHROCYTE [DISTWIDTH] IN BLOOD BY AUTOMATED COUNT: 45.8 FL (ref 35–45)
GFR SERPL CREATININE-BSD FRML MDRD: > 90 ML/MIN/1.73M2
GLUCOSE BLD-MCNC: 103 MG/DL
GLUCOSE BLD-MCNC: 57 MG/DL (ref 70–108)
GLUCOSE BLD-MCNC: 82 MG/DL (ref 70–108)
GLUCOSE BLD-MCNC: 95 MG/DL (ref 70–108)
HCT VFR BLD CALC: 42.2 % (ref 42–52)
HEMOGLOBIN: 14.4 GM/DL (ref 14–18)
IMMATURE GRANS (ABS): 0.11 THOU/MM3 (ref 0–0.07)
IMMATURE GRANULOCYTES: 0.7 %
LYMPHOCYTES # BLD: 17.8 %
LYMPHOCYTES ABSOLUTE: 2.7 THOU/MM3 (ref 1–4.8)
MCH RBC QN AUTO: 29.9 PG (ref 26–33)
MCHC RBC AUTO-ENTMCNC: 34.1 GM/DL (ref 32.2–35.5)
MCV RBC AUTO: 87.7 FL (ref 80–94)
MONOCYTES # BLD: 9.2 %
MONOCYTES ABSOLUTE: 1.4 THOU/MM3 (ref 0.4–1.3)
NUCLEATED RED BLOOD CELLS: 0 /100 WBC
OSMOLALITY CALCULATION: 279 MOSMOL/KG (ref 275–300)
PLATELET # BLD: 262 THOU/MM3 (ref 130–400)
PMV BLD AUTO: 9.4 FL (ref 9.4–12.4)
POTASSIUM SERPL-SCNC: 3.7 MEQ/L (ref 3.5–5.2)
PRO-BNP: 39 PG/ML (ref 0–900)
RBC # BLD: 4.81 MILL/MM3 (ref 4.7–6.1)
SEG NEUTROPHILS: 69.2 %
SEGMENTED NEUTROPHILS ABSOLUTE COUNT: 10.6 THOU/MM3 (ref 1.8–7.7)
SODIUM BLD-SCNC: 141 MEQ/L (ref 135–145)
TOTAL PROTEIN: 7.5 G/DL (ref 6.1–8)
TROPONIN T: < 0.01 NG/ML
WBC # BLD: 15.3 THOU/MM3 (ref 4.8–10.8)

## 2020-06-25 PROCEDURE — 93000 ELECTROCARDIOGRAM COMPLETE: CPT | Performed by: NURSE PRACTITIONER

## 2020-06-25 PROCEDURE — 85025 COMPLETE CBC W/AUTO DIFF WBC: CPT

## 2020-06-25 PROCEDURE — G8417 CALC BMI ABV UP PARAM F/U: HCPCS | Performed by: NURSE PRACTITIONER

## 2020-06-25 PROCEDURE — 96361 HYDRATE IV INFUSION ADD-ON: CPT

## 2020-06-25 PROCEDURE — 6370000000 HC RX 637 (ALT 250 FOR IP): Performed by: PHYSICIAN ASSISTANT

## 2020-06-25 PROCEDURE — 36415 COLL VENOUS BLD VENIPUNCTURE: CPT

## 2020-06-25 PROCEDURE — 99214 OFFICE O/P EST MOD 30 MIN: CPT | Performed by: NURSE PRACTITIONER

## 2020-06-25 PROCEDURE — 82962 GLUCOSE BLOOD TEST: CPT | Performed by: NURSE PRACTITIONER

## 2020-06-25 PROCEDURE — G8427 DOCREV CUR MEDS BY ELIG CLIN: HCPCS | Performed by: NURSE PRACTITIONER

## 2020-06-25 PROCEDURE — 1036F TOBACCO NON-USER: CPT | Performed by: NURSE PRACTITIONER

## 2020-06-25 PROCEDURE — 99285 EMERGENCY DEPT VISIT HI MDM: CPT

## 2020-06-25 PROCEDURE — 93005 ELECTROCARDIOGRAM TRACING: CPT | Performed by: PHYSICIAN ASSISTANT

## 2020-06-25 PROCEDURE — 84484 ASSAY OF TROPONIN QUANT: CPT

## 2020-06-25 PROCEDURE — 82948 REAGENT STRIP/BLOOD GLUCOSE: CPT

## 2020-06-25 PROCEDURE — 80053 COMPREHEN METABOLIC PANEL: CPT

## 2020-06-25 PROCEDURE — 3017F COLORECTAL CA SCREEN DOC REV: CPT | Performed by: NURSE PRACTITIONER

## 2020-06-25 PROCEDURE — 96360 HYDRATION IV INFUSION INIT: CPT

## 2020-06-25 PROCEDURE — 93010 ELECTROCARDIOGRAM REPORT: CPT | Performed by: INTERNAL MEDICINE

## 2020-06-25 PROCEDURE — 3046F HEMOGLOBIN A1C LEVEL >9.0%: CPT | Performed by: NURSE PRACTITIONER

## 2020-06-25 PROCEDURE — 2022F DILAT RTA XM EVC RTNOPTHY: CPT | Performed by: NURSE PRACTITIONER

## 2020-06-25 PROCEDURE — 2580000003 HC RX 258: Performed by: PHYSICIAN ASSISTANT

## 2020-06-25 PROCEDURE — 83880 ASSAY OF NATRIURETIC PEPTIDE: CPT

## 2020-06-25 RX ORDER — 0.9 % SODIUM CHLORIDE 0.9 %
500 INTRAVENOUS SOLUTION INTRAVENOUS ONCE
Status: COMPLETED | OUTPATIENT
Start: 2020-06-25 | End: 2020-06-25

## 2020-06-25 RX ORDER — SULFAMETHOXAZOLE AND TRIMETHOPRIM 400; 80 MG/1; MG/1
1 TABLET ORAL 2 TIMES DAILY
Qty: 20 TABLET | Refills: 0 | Status: CANCELLED | OUTPATIENT
Start: 2020-06-25 | End: 2020-07-05

## 2020-06-25 RX ORDER — METOPROLOL TARTRATE 50 MG/1
25 TABLET, FILM COATED ORAL ONCE
Status: COMPLETED | OUTPATIENT
Start: 2020-06-25 | End: 2020-06-25

## 2020-06-25 RX ORDER — MECLIZINE HYDROCHLORIDE CHEWABLE TABLETS 25 MG/1
25 TABLET, CHEWABLE ORAL ONCE
Status: COMPLETED | OUTPATIENT
Start: 2020-06-25 | End: 2020-06-25

## 2020-06-25 RX ORDER — METOPROLOL SUCCINATE 25 MG/1
12.5 TABLET, EXTENDED RELEASE ORAL DAILY
Qty: 45 TABLET | Refills: 1 | Status: CANCELLED | OUTPATIENT
Start: 2020-06-25

## 2020-06-25 RX ADMIN — METOPROLOL TARTRATE 25 MG: 50 TABLET, FILM COATED ORAL at 18:23

## 2020-06-25 RX ADMIN — SODIUM CHLORIDE 500 ML: 9 INJECTION, SOLUTION INTRAVENOUS at 17:30

## 2020-06-25 RX ADMIN — SODIUM CHLORIDE 500 ML: 9 INJECTION, SOLUTION INTRAVENOUS at 18:30

## 2020-06-25 RX ADMIN — MECLIZINE HCL 25 MG: 25 TABLET, CHEWABLE ORAL at 21:03

## 2020-06-25 ASSESSMENT — ENCOUNTER SYMPTOMS
SORE THROAT: 0
NAUSEA: 0
DIARRHEA: 1
COLOR CHANGE: 0
WHEEZING: 0
SHORTNESS OF BREATH: 1
VOMITING: 0
ABDOMINAL PAIN: 0
COUGH: 0
BACK PAIN: 0

## 2020-06-25 NOTE — ED NOTES
Pt accu check - 57. [facility] PAC notified, pt provided with orange juice and box lunch.       Lin Limon RN  06/25/20 [id number]

## 2020-06-25 NOTE — PROGRESS NOTES
[address] [address] INTERNAL MEDICINE  [address]  Dept: [phone]  Dept Fax: [phone]  Loc: [phone]     Visit Date:  6/25/2020    Patient:  Fred Swift  YOB: 1970    HPI:     Chief Complaint   Patient presents with    Tachycardia       Right finger wound - States he thinks he is septic due to burn on his finger. Has an appt with Dr. Nelsy Camacho on 7/1/2020. States he has a skin infection in his groin. States he was not sent home on antibiotics from the hospital.     Tachycardia - States HR is high, states it was 145 this am. Currently,  on EKG, sinus tach. Reports he has been diaphoretic, fatigued, SOB. Denies chest pain. Has not been out of bed since getting released. States he had vertigo a couple of days ago. DM - Last check this am 198. Reports he checks three times daily, Lantus 55 units twice daily, Humalog 25 units plus scale. Denies polyuria, polydipsia. Prior A1C 8.2% in March, last A1C 11.2 6/20/2020. Denies lows. Off Metformin due to acidosis recurrent and chronic. Having diarrhea after he eats, states within an hour. Therefore he is eating only once daily. Last doxycycline 5/14-5/21/2020. Hypothyroidism - Last TSH 2.130 6/18/2020.        Medications    Current Outpatient Medications:     lisinopril (PRINIVIL;ZESTRIL) 5 MG tablet, Take 1 tablet by mouth daily, Disp: 30 tablet, Rfl: 0    insulin detemir (LEVEMIR FLEXTOUCH) 100 UNIT/ML injection pen, Inject 85 Units into the skin 2 times daily (Patient taking differently: Inject 55 Units into the skin 2 times daily ), Disp: 30 pen, Rfl: 5    ondansetron (ZOFRAN) 4 MG tablet, Take 1 tablet by mouth every 8 hours as needed for Nausea or Vomiting, Disp: 30 tablet, Rfl: 0    AMINO ACIDS COMPLEX PO, Take 1 each by mouth daily Daily AA drink, Disp: , Rfl:     Insulin Syringe-Needle U-100 29G X 1/2\" 0.3 ML MISC, 1 each by Does not apply route 4 times daily (after meals and at bedtime), Disp: 200 each, Rfl: 0    sertraline (ZOLOFT) 50 MG tablet, Take 1 tablet by mouth daily, Disp: 30 tablet, Rfl: 5    levothyroxine (SYNTHROID) 50 MCG tablet, Take 1 tablet by mouth daily, Disp: 30 tablet, Rfl: 5    gabapentin (NEURONTIN) 300 MG capsule, Take 1 capsule by mouth 3 times daily for 60 days. , Disp: 90 capsule, Rfl: 1    insulin aspart (NOVOLOG FLEXPEN) 100 UNIT/ML injection pen, Inject 25 Units into the skin 3 times daily (before meals), Disp: 22.5 mL, Rfl: 5    blood glucose monitor kit and supplies, Accu Chek Sheila meter. Use to test blood sugars DX:E11.65, Disp: 1 kit, Rfl: 0    blood glucose monitor strips, Accucheck Sheila Test Strips Test blood sugars 4 times daily DX:E11.65, Disp: 400 strip, Rfl: 5    Lancets MISC, Use to test blood sugars 4 times daily DX:E11.65, Disp: 400 each, Rfl: 5    amoxicillin-clavulanate (AUGMENTIN) 875-125 MG per tablet, Take 1 tablet by mouth 2 times daily for 14 days, Disp: 28 tablet, Rfl: 0    metoprolol tartrate (LOPRESSOR) 25 MG tablet, Take 1 tablet by mouth 2 times daily, Disp: 60 tablet, Rfl: 0    The patient is allergic to pcn [penicillins]; clindamycin/lincomycin; vancomycin; and actos [pioglitazone].     Past Medical History  Caleb Dubon  has a past medical history of Bipolar 2 disorder ([facility] [address] BPH (benign prostatic hyperplasia), Diabetes mellitus type 2, uncontrolled ([facility] [address] Diabetic peripheral neuropathy ([facility] [address] Diabetic polyneuropathy ([facility] [address] Diabetic ulcer of right foot associated with type 2 diabetes mellitus ([facility] [address] Essential hypertension, GERD (gastroesophageal reflux disease), Hammer toe of left foot, Heart murmur, History of tobacco abuse, Hx of AKA (above knee amputation), right ([address] Macular edema, diabetic, bilateral ([facility] [address] Marijuana abuse in remission, Melena, MRSA (methicillin resistant staph aureus) culture positive, Onychomycosis, Other disorders of kidney and ureter in diseases classified elsewhere, Sleep apnea, Thyroid disease, and WD-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 Past Surgical History  The patient  has a past surgical history that includes other surgical history (Right, 1/14/15); Abscess Drainage (Right); Toe amputation (Right, 1/16/15); Foot Debridement (Right, 07/01/2016); Leg amputation below knee (Right, 07/20/2016); [place] tooth extraction (?when); pr drain infect shoulder bursa (Left, 8/18/2017); pr office/outpt visit,procedure only (Right, 9/20/2018); incision and drainage (Right, 2/18/2019); Leg amputation below knee (Right, 4/4/2019); AMPUTATION ABOVE KNEE (Right, 4/18/2019); Upper gastrointestinal endoscopy (N/A, 3/3/2020); Cholecystectomy, laparoscopic (N/A, 4/1/2020); and Endoscopy, colon, diagnostic. Family History  This patient's family history includes Arthritis in his maternal grandfather; Depression in his mother; Diabetes in his mother; Early Death in his mother; Heart Disease in his maternal grandfather; High Blood Pressure in his mother; High Cholesterol in his mother; Other in his mother; Vision Loss in his maternal grandmother. Social History  Nikhil Luz  reports that he quit smoking about 19 years ago. His smoking use included cigars. He started smoking about 35 years ago. He has a 10.00 pack-year smoking history. He has never used smokeless tobacco. He reports previous alcohol use. He reports that he does not use drugs.     Health Maintenance:    Health Maintenance   Topic Date Due    Hepatitis B vaccine (1 of 3 - Risk 3-dose series) 01/23/1987    DTaP/Tdap/Td vaccine (1 - Tdap) 01/23/1987    Shingles Vaccine (1 of 2) 01/23/2018    Colon cancer screen colonoscopy  01/23/2018    Diabetic retinal exam  05/09/2019    Annual Wellness Visit (AWV)  06/04/2019    Diabetic microalbuminuria test  10/10/2019    Lipid screen  10/10/2019    Diabetic foot exam  02/01/2020    Flu vaccine (1) 09/01/2020    A1C test (Diabetic or Prediabetic)  10/06/2020    TSH testing  06/18/2021    Potassium monitoring  07/06/2021    Creatinine monitoring  07/06/2021    Pneumococcal 0-64 years Vaccine  Completed    HIV screen  Completed    Hepatitis A vaccine  Aged Out    Hib vaccine  Aged Out    Meningococcal (ACWY) vaccine  Aged Out       Subjective:      Review of Systems   Constitutional: Positive for diaphoresis and fatigue. Negative for chills and fever. HENT: Negative for sore throat and trouble swallowing. Eyes: Negative for itching and visual disturbance. Respiratory: Negative for cough, choking and shortness of breath. Cardiovascular: Positive for palpitations. Negative for chest pain and leg swelling. Gastrointestinal: Positive for diarrhea. Negative for abdominal pain, constipation, nausea and vomiting. Endocrine: Negative for cold intolerance and heat intolerance. Genitourinary: Negative for difficulty urinating and dysuria. Musculoskeletal: Negative for arthralgias, joint swelling and myalgias. Skin: Positive for wound (Burn right index finger, scabbing left shin). Negative for rash. Neurological: Positive for dizziness, light-headedness and numbness (Chronic neuropathy). Negative for tremors, syncope, weakness and headaches. Psychiatric/Behavioral: Negative for agitation, dysphoric mood, sleep disturbance and suicidal ideas. The patient is not nervous/anxious. Objective:     /61 (Site: Right Upper Arm, Position: Sitting, Cuff Size: Large Adult)   Pulse 119   Temp 98.1 °F (36.7 °C) (Temporal)   Ht 5' 6\" (1.676 m)   Wt 222 lb (100.7 kg)   BMI 35.83 kg/m²     Physical Exam  Vitals signs reviewed. Constitutional:       General: He is not in acute distress. Appearance: He is well-developed. He is not diaphoretic. HENT:      Head: Normocephalic and atraumatic. Mouth/Throat:      Pharynx: No oropharyngeal exudate. Eyes:      General: No scleral icterus. Right eye: No discharge. Left eye: No discharge. Pupils: Pupils are equal, round, and reactive to light. Neck:      Musculoskeletal: Normal range of motion and neck supple. Thyroid: No thyromegaly. Cardiovascular:      Rate and Rhythm: Regular rhythm. Tachycardia present. Heart sounds: Normal heart sounds. No murmur. No friction rub. No gallop. Pulmonary:      Effort: Pulmonary effort is normal. No respiratory distress. Breath sounds: Normal breath sounds. No wheezing or rales. Abdominal:      General: There is no distension. Palpations: Abdomen is soft. Tenderness: There is no abdominal tenderness. Musculoskeletal: Normal range of motion. General: Swelling (Trace left anterior tibia ) present. No tenderness or deformity. Lymphadenopathy:      Cervical: No cervical adenopathy. Skin:     General: Skin is warm and dry. Coloration: Skin is not pale. Findings: No erythema or rash. Comments: Right index finger with burn, no drainage noted. Not warm, dry. Left shin with multiple scabbed areas, noted some purulent drainage from one scabbed area, cultured. Neurological:      Mental Status: He is alert and oriented to person, place, and time. Cranial Nerves: No cranial nerve deficit. Labs Reviewed 6/25/2020:    Lab Results   Component Value Date    WBC 11.1 (H) 07/06/2020    HGB 14.4 07/06/2020    HCT 43.2 07/06/2020     07/06/2020    CHOL 161 10/10/2018    TRIG 328 (H) 10/10/2018    HDL 30 10/10/2018    ALT 28 07/06/2020    AST 31 07/06/2020     (L) 07/06/2020    K 3.9 07/06/2020    CL 96 (L) 07/06/2020    CREATININE 0.6 07/06/2020    BUN 7 07/06/2020    CO2 26 07/06/2020    TSH 2.130 06/18/2020    PSA 0.78 03/24/2020    INR 0.97 11/06/2019    GLUF 179 (H) 08/24/2016    LABA1C 10.9 (H) 07/06/2020    LABMICR 1.21 10/10/2018       Assessment/Plan      1. Tachycardia  EKG . States was 145 with activity today.    Due to dizziness, diaphoresis and SOB, question if he is dry, cannot r/o other causative factors in the outpatient office today. Needs fluids, stat labs and further workup. He desires to go to ED, called and discussed his case with ED physican and they agree with evaluation due to symptoms. Has order for holter monitor, advised to get this applied post ED visit. - Comprehensive Metabolic Panel; Future  - CBC With Auto Differential; Future  - Culture, Blood 2; Future  - EKG 12 Lead  - C Difficile Toxin Molecular; Future  - Culture, Aerobic and Anaerobic    2. Shortness of breath  - Full PFT Study With Bronchodilator; Future    3. Dizziness    4. Diarrhea, unspecified type  On multiple antibiotics in the recent months. - Comprehensive Metabolic Panel; Future  - CBC With Auto Differential; Future  - Culture, Blood 2; Future  - C Difficile Toxin Molecular; Future    5. Wound of left lower extremity, subsequent encounter  Will await culture results for antibiotic guidance. Already has appt scheduled with wound clinic 7/1.   - Culture, Aerobic and Anaerobic    6. Superficial burn of right index finger  Clean, dry, intact. 7. Uncontrolled type 2 diabetes mellitus with hyperglycemia, with long-term current use of insulin ([facility])  Pt glucose has been largely uncontrolled. Pt often out of insulin, and questionable compliance with treatment, as when taking insulins in the hospital, his glucose will be well controlled. Glucose in office today, 103  - POCT Glucose    Will see back for post ED visit. No follow-ups on file. .   Patient given educational materials - see patient instructions. Discussed use, benefit, and side effects of prescribed medications. All patient questions answered. Pt voiced understanding.       Electronically signed MARILYN Jones - CNP on 6/25/20 at 3:14 PM EDT

## 2020-06-25 NOTE — ED PROVIDER NOTES
Medication List as of 2020  9:54 PM      CONTINUE these medications which have NOT CHANGED    Details   insulin aspart (NOVOLOG FLEXPEN) 100 UNIT/ML injection pen Inject 25 Units into the skin 3 times daily (before meals), Disp-5 pen, R-3Normal      sertraline (ZOLOFT) 50 MG tablet Take 1 tablet by mouth daily, Disp-30 tablet, R-5Normal      levothyroxine (SYNTHROID) 50 MCG tablet Take 1 tablet by mouth daily, Disp-30 tablet, R-5Normal      gabapentin (NEURONTIN) 300 MG capsule Take 1 capsule by mouth 3 times daily for 30 days. , Disp-90 capsule, R-0Normal      lisinopril (PRINIVIL;ZESTRIL) 5 MG tablet Take 1 tablet by mouth daily, Disp-30 tablet, R-0Normal      insulin detemir (LEVEMIR FLEXTOUCH) 100 UNIT/ML injection pen Inject 85 Units into the skin 2 times daily, Disp-30 pen, R-5Normal      ondansetron (ZOFRAN) 4 MG tablet Take 1 tablet by mouth every 8 hours as needed for Nausea or Vomiting, Disp-30 tablet, R-0Normal      Lancets MISC Disp-200 each, R-0, NormalUse to test blood sugars 4 times daily DX:E11.65      AMINO ACIDS COMPLEX PO Take 1 each by mouth daily Daily AA drinkHistorical Med      blood glucose monitor kit and supplies Accu Chek Sheila meter. Use to test blood sugars DX:E11.65, Disp-1 kit, R-0, Normal      blood glucose monitor strips Accucheck Sheila Test Strips Test blood sugars 4 times daily DX:E11.65, Disp-400 strip, R-5, Normal      Insulin Syringe-Needle U-100 29G X 1/2\" 0.3 ML MISC 4 TIMES DAILY AFTER MEALS AND BEFORE BEDTIME Starting 2019, Disp-200 each, R-0, Normal             ALLERGIES     is allergic to pcn [penicillins]; clindamycin/lincomycin; vancomycin; and actos [pioglitazone]. FAMILY HISTORY     He indicated that his mother is . He reported the following about his father: unknown. He indicated that the status of his maternal grandmother is unknown.  He indicated that the status of his maternal grandfather is unknown.   family history includes Arthritis in his (Please note that portions of this note were completedwith a voice recognition program.  Efforts were made to edit the dictations but occasionally words are mis-transcribed.)        Phill Fierro PA-C  06/25/20 [id number]

## 2020-06-26 ENCOUNTER — CARE COORDINATION (OUTPATIENT)
Dept: CASE MANAGEMENT | Age: 50
End: 2020-06-26

## 2020-06-27 ENCOUNTER — CARE COORDINATION (OUTPATIENT)
Dept: CASE MANAGEMENT | Age: 50
End: 2020-06-27

## 2020-06-29 ENCOUNTER — TELEPHONE (OUTPATIENT)
Dept: WOUND CARE | Age: 50
End: 2020-06-29

## 2020-06-30 ENCOUNTER — TELEPHONE (OUTPATIENT)
Dept: WOUND CARE | Age: 50
End: 2020-06-30

## 2020-06-30 LAB
AEROBIC CULTURE: ABNORMAL
ANAEROBIC CULTURE: ABNORMAL
GRAM STAIN RESULT: ABNORMAL
ORGANISM: ABNORMAL

## 2020-06-30 NOTE — TELEPHONE ENCOUNTER
Recent Travel Screening and Travel History documentation:     Travel Screening       Question Response     Do you have any of the following symptoms? None of these     In the last month, have you been in contact with someone who was confirmed or suspected to have Coronavirus / COVID-19? No / Unsure     Have you traveled internationally in the last month? No      Travel History   Travel since 05/30/20     No documented travel since 05/30/20        Pt screened before appt tomorrow.

## 2020-07-01 ENCOUNTER — HOSPITAL ENCOUNTER (OUTPATIENT)
Dept: WOUND CARE | Age: 50
Discharge: HOME | End: 2020-07-01
Payer: MEDICARE

## 2020-07-01 VITALS
WEIGHT: 222 LBS | DIASTOLIC BLOOD PRESSURE: 82 MMHG | HEART RATE: 110 BPM | SYSTOLIC BLOOD PRESSURE: 146 MMHG | HEIGHT: 66 IN | TEMPERATURE: 98.8 F | BODY MASS INDEX: 35.68 KG/M2 | OXYGEN SATURATION: 96 % | RESPIRATION RATE: 18 BRPM

## 2020-07-01 PROBLEM — L97.909 DIABETIC ULCER OF LOWER EXTREMITY (HCC): Status: ACTIVE | Noted: 2020-07-01

## 2020-07-01 PROBLEM — B95.0 STREPTOCOCCUS INFECTION, GROUP A: Status: ACTIVE | Noted: 2020-07-01

## 2020-07-01 PROBLEM — E11.622 DIABETIC ULCER OF LOWER EXTREMITY (HCC): Status: ACTIVE | Noted: 2020-07-01

## 2020-07-01 PROBLEM — L98.499 ULCER, SKIN, NON-HEALING (HCC): Status: ACTIVE | Noted: 2020-07-01

## 2020-07-01 PROCEDURE — 99214 OFFICE O/P EST MOD 30 MIN: CPT

## 2020-07-01 PROCEDURE — 99213 OFFICE O/P EST LOW 20 MIN: CPT | Performed by: NURSE PRACTITIONER

## 2020-07-01 PROCEDURE — 99213 OFFICE O/P EST LOW 20 MIN: CPT

## 2020-07-01 RX ORDER — AMOXICILLIN AND CLAVULANATE POTASSIUM 875; 125 MG/1; MG/1
1 TABLET, FILM COATED ORAL 2 TIMES DAILY
Qty: 28 TABLET | Refills: 0 | Status: SHIPPED | OUTPATIENT
Start: 2020-07-01 | End: 2020-07-15

## 2020-07-01 ASSESSMENT — PAIN DESCRIPTION - LOCATION: LOCATION: FINGER (COMMENT WHICH ONE)

## 2020-07-01 ASSESSMENT — PAIN DESCRIPTION - ONSET: ONSET: PROGRESSIVE

## 2020-07-01 ASSESSMENT — PAIN DESCRIPTION - PROGRESSION: CLINICAL_PROGRESSION: GRADUALLY WORSENING

## 2020-07-01 ASSESSMENT — PAIN DESCRIPTION - FREQUENCY: FREQUENCY: CONTINUOUS

## 2020-07-01 ASSESSMENT — PAIN DESCRIPTION - DESCRIPTORS: DESCRIPTORS: STABBING

## 2020-07-01 ASSESSMENT — PAIN SCALES - GENERAL: PAINLEVEL_OUTOF10: 9

## 2020-07-01 ASSESSMENT — PAIN DESCRIPTION - ORIENTATION: ORIENTATION: RIGHT

## 2020-07-01 NOTE — PROGRESS NOTES
[address] [facility] and Procedure Note      [facility] RECORD NUMBER:  [id number]  AGE: 44 y.o. GENDER: male  : 1970  EPISODE DATE:  2020    SUBJECTIVE:     Chief Complaint   Patient presents with    Wound Check     right 2nd finger         HISTORY OF PRESENT ILLNESS      Basilia Jimenez is a 44 y.o. male who presents today for wound/ulcer evaluation. Monica Valverde reports worsening of his right finger wound. He reports severe pain (10/10) with increased erythema, drainage, and edema. Drainage is described as being purulent in nature. He reports decreased range of motion to right index finger. He states that he has been washing wound with soap and water and coating in betadine then leaving open to air daily. Monica Valverde is also complaining of new wound to left lower extremity-anterior portion. He states that he is unsure of events leading to wound but that he \"probably ran into something. \"  Denies sensation to lower extremity related to neuropathy. Is unable to identify if there has been drainage from wound but states that his PCP told him there was drainage at recent visit. This drainage was sent for culture by PCP. Monica Valverde was recently discharged from hospital on 2020 for URI/bronchitis with prescription for doxycycline. Monica Valverde states that he did not take this prescription related to orders from PCP to stop medication. Monica Valverde reports ongoing dizziness/tachycardia for which he has been evaluated in the emergency department and is being followed by PCP. Monica Valverde currently denies any chest pain or worsening/change to quality of his shortness of breath. He also reports ongoing diaphoresis but denies fever or chills. + loose stools for which stool studies have been ordered by PCP. He is a chronic diabetic,last hemoglobin A1C 11.2 on 2020- being followed by PCP.      PAST MEDICAL HISTORY             Diagnosis Date    Bipolar 2 disorder [facility])     previously followed LEFT SHOULDER INCISION AND DRAINAGE performed by Natalie Clark MD at [facility] OFFICE/OUTPT [address] Right 2018    EXCISIONAL DEBRIDEMENT RIGHT BKA STUMP performed by Tawnya Sosa MD at [address] Right 1/16/15    2nd toe with wound vac applied    UPPER GASTROINTESTINAL ENDOSCOPY N/A 3/3/2020    EGD DILATION SAVORY performed by Gus Fraser MD at [facility]  ? when       FAMILY HISTORY     Family History   Problem Relation Age of Onset    Diabetes Mother     Other Mother         pneumonia, H1N1    Depression Mother     Early Death Mother     High Blood Pressure Mother     High Cholesterol Mother     Vision Loss Maternal Grandmother     Arthritis Maternal Grandfather     Heart Disease Maternal Grandfather        SOCIAL HISTORY     Social History     Tobacco Use    Smoking status: Former Smoker     Packs/day: 1.00     Years: 10.00     Pack years: 10.00     Types: Cigars     Start date: 1985     Last attempt to quit: 2000     Years since quittin.8    Smokeless tobacco: Never Used    Tobacco comment: Use to smoke now I don't   Substance Use Topics    Alcohol use: Not Currently     Alcohol/week: 0.0 standard drinks    Drug use: No       ALLERGIES     Allergies   Allergen Reactions    Pcn [Penicillins] Shortness Of Breath, Nausea And Vomiting and Other (See Comments)     Does not remember reactions. Has TOLERATED amoxicillin and several different cephalosporins.  Clindamycin/Lincomycin Itching    Vancomycin Hives      Rash, with erythematous plaques to abdomen, shoulders, and proximal upper extremities with pruritis, persisted with 2nd dose administered slower.       Actos [Pioglitazone] Swelling       MEDICATIONS     Current Outpatient Medications on File Prior to Encounter   Medication Sig Dispense Refill    metoprolol tartrate (LOPRESSOR) 25 MG tablet Take 1 tablet by mouth 2 times daily 60 tablet 0    insulin aspart (NOVOLOG FLEXPEN) 100 UNIT/ML injection pen Inject 25 Units into the skin 3 times daily (before meals) 5 pen 3    sertraline (ZOLOFT) 50 MG tablet Take 1 tablet by mouth daily 30 tablet 5    levothyroxine (SYNTHROID) 50 MCG tablet Take 1 tablet by mouth daily 30 tablet 5    gabapentin (NEURONTIN) 300 MG capsule Take 1 capsule by mouth 3 times daily for 30 days. 90 capsule 0    lisinopril (PRINIVIL;ZESTRIL) 5 MG tablet Take 1 tablet by mouth daily 30 tablet 0    insulin detemir (LEVEMIR FLEXTOUCH) 100 UNIT/ML injection pen Inject 85 Units into the skin 2 times daily (Patient taking differently: Inject 55 Units into the skin 2 times daily ) 30 pen 5    ondansetron (ZOFRAN) 4 MG tablet Take 1 tablet by mouth every 8 hours as needed for Nausea or Vomiting 30 tablet 0    Lancets MISC Use to test blood sugars 4 times daily DX:E11.65 200 each 0    AMINO ACIDS COMPLEX PO Take 1 each by mouth daily Daily AA drink      blood glucose monitor kit and supplies Accu Chek Sheila meter. Use to test blood sugars DX:E11.65 1 kit 0    blood glucose monitor strips Accucheck Sheila Test Strips Test blood sugars 4 times daily DX:E11.65 400 strip 5    Insulin Syringe-Needle U-100 29G X 1/2\" 0.3 ML MISC 1 each by Does not apply route 4 times daily (after meals and at bedtime) 200 each 0     No current facility-administered medications on file prior to encounter.         REVIEW OF SYSTEMS:     Constitutional: +diaphoresis Denies fever, chills, night sweats, fatigue, weight loss/gain, loss of appetite   Head: Denies headache, head injury  Eye: Denies visual changes  Ears: Denies hearing loss, tinnitus  Mouth/throat: Denies ulceration, dental caries , dysphagia  Respiratory: +shortness of breath Denies cough, wheezing   Cardiovascular:+tachycardia, edema  Denies chest pain, palpitations  Gastrointestinal: +diarrhea Denies nausea, vomiting, constipation, abdominal pain   ISMAEL: Denies dysuria, frequency, urgency, hematuria  Musculoskeletal: +Right index finger pain, decreased range of motion, edema. +right AKA +peripheral neuropathy, left lower extremity. Denies further joint pain, swelling , stiffness  Endocrine: Denies polyuria, polydipsia, cold or heat intolerance  Hematology: Denies easy brusing or bleeding, hx of clotting disorder  Dermatology: +Wound to right index finger, left lower extremity. Denies skin rash, eczema, pruritis  Psychiatry: Denies depression, anxiety, suicidal ideation    PHYSICAL EXAM:     BP (!) 146/82   Pulse 110   Temp 98.8 °F (37.1 °C) (Tympanic)   Resp 18   Ht 5' 6\" (1.676 m)   Wt 222 lb (100.7 kg)   SpO2 96%   BMI 35.83 kg/m²   Wt Readings from Last 3 Encounters:   07/01/20 222 lb (100.7 kg)   06/25/20 222 lb (100.7 kg)   06/25/20 222 lb (100.7 kg)       General:  Awake, alert, no apparent distress. Appears stated age  [de-identified]: conjuctivae are clear without exudate or hemorrhage, anicteric sclera, moist oral mucosa. Chest:  Respirations regular, non-labored. Chest rise and fall equal bilaterally. Lungs clear to auscultation throughout all fields  Cardiovascular:  Regular rhythm, tachycardia,S1S2, no murmur or gallop. Abdomen:  Soft, non tender to palpation. Extremities: non-traumatic in appearance. Right AKA. Left lower extremity shows  +1 edema, rin in appearance. Capillary refill greater than 3 seconds. Pulses palpable  Skin:  Warm and dry. Right index finger middle interphalangeal region shows yellow wound bed with small area of black tissue. Small amount serosanguinous drainage observed. Periwound erythematous, macerated, edematous, tender to palpation. Range of motion decreased. Sensation intact. Left lower extremity wound to anterior shin shows attached edges with 90% black wound base with the remaining 10% yellow. Small amount serosanguinous drainage observed throughout assessment. Periwound macerated, edematous with significant erythema.   Non-tender on palpation. Neurological: Awake, alert, oriented x4. Sensation decreased to left lower extremity  Psychiatric:  Appropriate mood and affect      Wound 06/18/20 Finger (Comment which one) Right (Active)   Wound Image   7/1/2020  9:13 AM   Wound Diabetic 6/18/2020  9:12 AM   Offloading for Diabetic Foot Ulcers No offloading required 6/18/2020  9:12 AM   Dressing Status Changed 7/1/2020  9:13 AM   Dressing Changed Changed/New 7/1/2020  9:13 AM   Wound Cleansed Rinsed/Irrigated with saline 7/1/2020  9:13 AM   Wound Length (cm) 2 cm 7/1/2020  9:13 AM   Wound Width (cm) 0.5 cm 7/1/2020  9:13 AM   Wound Depth (cm) 0.2 cm 7/1/2020  9:13 AM   Wound Surface Area (cm^2) 1 cm^2 7/1/2020  9:13 AM   Change in Wound Size % (l*w) -66.67 7/1/2020  9:13 AM   Wound Volume (cm^3) 0.2 cm^3 7/1/2020  9:13 AM   Wound Assessment Yellow;Pink;Black 7/1/2020  9:13 AM   Drainage Amount Moderate 7/1/2020  9:13 AM   Drainage Description Serosanguinous; Yellow 7/1/2020  9:13 AM   Odor None 7/1/2020  9:13 AM   Margins Attached edges 7/1/2020  9:13 AM   Louise-wound Assessment Dry; White;Red;Maceration 7/1/2020  9:13 AM   [place]%Wound Bed 40 7/1/2020  9:13 AM   Yellow%Wound Bed 50 7/1/2020  9:13 AM   Black%Wound Bed 10 7/1/2020  9:13 AM   Number of days: 13       Wound 07/01/20 Leg Left; Anterior 2 (Active)   Wound Image   7/1/2020  9:18 AM   Dressing Changed Changed/New 7/1/2020  9:18 AM   Wound Cleansed Rinsed/Irrigated with saline 7/1/2020  9:18 AM   Wound Length (cm) 3.5 cm 7/1/2020  9:18 AM   Wound Width (cm) 1.7 cm 7/1/2020  9:18 AM   Wound Depth (cm) 0.1 cm 7/1/2020  9:18 AM   Wound Surface Area (cm^2) 5.95 cm^2 7/1/2020  9:18 AM   Wound Volume (cm^3) 0.6 cm^3 7/1/2020  9:18 AM   Wound Assessment Black; Pink 7/1/2020  9:18 AM   Drainage Amount Small 7/1/2020  9:18 AM   Drainage Description Yellow 7/1/2020  9:18 AM   Odor None 7/1/2020  9:18 AM   Margins Attached edges 7/1/2020  9:18 AM   Louise-wound Assessment White;Red;Swelling; Maceration 7/1/2020 E66.9    History of noncompliance with medical treatment Z91.19    Essential hypertension I10    Cough R05    Tobacco dependence F17.200    Gastroesophageal reflux disease without esophagitis K21.9    History of marijuana use Z87.898    Physical debility R53.81    Anemia D64.9    Electrolyte imbalance E87.8    Type 2 diabetes mellitus with hyperglycemia, with long-term current use of insulin ([facility]) E11.65, Z79.4    Weakness R53.1    Infection of above knee amputation stump of right leg ([facility]) T87.43    Above knee amputation of right lower extremity ([facility]) Z89.062E    Sepsis ([facility]) A41.9    Vitamin D deficiency E55.9    COPD exacerbation ([facility]) J44.1    Influenza B J10.1    Depression, recurrent ([facility]) F33.9    Major depressive disorder, recurrent ([facility]) F33.9    GI bleed K92.2    Elevated lipase R74.8    Pressure ulcer of left calf, unstageable ([facility]) L89.890    Other psychoactive substance abuse, uncomplicated ([facility]) K21.74    Calculus of gallbladder without cholecystitis without obstruction K80.20    Right upper quadrant abdominal pain R10.11    Cellulitis of hand L03.119    Pedal edema R60.0    MURALI (obstructive sleep apnea) G47.33    Shortness of breath R06.02    Hypothyroid E03.9    Anxiety and depression F41.9, F32.9    Dyspnea R06.00    Sinus tachycardia J67.7    Metabolic acidosis C02.0    Diabetic ulcer of lower extremity ([facility]) E11.622, L97.909    Ulcer, skin, non-healing ([facility]) L98.499    Streptococcus infection, group A B95.0       PLAN     Patient examined and evaluated  Reviewed lower extremity wound culture re;port with Royden Simmonds. Royden Simmonds states that he has been on multiple antibiotics, that they do not work for him, and he does not want any more antibiotics. He is requesting amputation of his finger to resolve the issue. Explained to Royden Simmonds that amputation is not appropriate at this time as this is a treatable wound.   I explained the need for antibiotic therapy due to the presence of Streptococcus pyrogenes in the culture reports, that his wound will not heal and may continue to worsen if the infection is not treated. He is agreeable to antibiotic therapy at this time. Allergies reviewed with Marie Jewell as it is reported that he has a PCN allergy. He states that he has taken amoxicillin/augmentin prior to this visit, after previous reaction to PCN, and did not have any kind of adverse reaction. He is agreeable to augmentin at this time with instructions to call office or seek emergency care with any signs/symptoms of adverse reaction. Marie Jewell verbalized understanding. Diaphoresis/tachycardia/shortness of breath- Marie Jewell appears to be in no distress at time of visit. No diaphoresis or respiratory distress/abnormal lung sounds noted. He denies chest pain or worsening of these symptoms from his recent baseline. Michelle Sage to follow up with PCP as scheduled or seek emergency care for any new onset or worsening symptoms. Wound care as follows:    Right 2nd finger- Apply betadine moist gauze to wound bed. Cover with gauze. Wrap with kerlix/paradise. Change once daily. Left leg wound- Apply betadine moist gauze to wound bed. Cover with gauze and abd pad. Wrap with kerlix and ace wrap. Change once daily. Stressed importance of keeping wounds clean and dry. Follow up 1 week    Antibiotics: Yes  Augmentin 875/125 mg PO BID x14 days. Antibiotic stop date: 7/15/2020    Educated Rehan Torres on the importance of pressure reduction for wound prevention. Pressure reduction techniques reviewed. Patient verbalized understanding. Also reviewed importance of awareness of lower extremity location with ambulation in wheelchair to prevent further injury related to bumping into objects. All questions and concerns addressed prior to discharge from today's visit.     Please see attached Discharge Instructions    Written patient dismissal instructions given to patient and signed by patient

## 2020-07-01 NOTE — PROGRESS NOTES
Kiera PHELPS has reviewed and agrees with RON Monson LPN's shift   Assessment.     Rafael Pouch  7/1/2020

## 2020-07-01 NOTE — PLAN OF CARE
Problem: Wound:  Goal: Will show signs of wound healing; wound closure and no evidence of infection  Description: Will show signs of wound healing; wound closure and no evidence of infection  Outcome: Ongoing   Pt presented to wound clinic for right 2nd finger wound. New wound noted on left leg. Purulent drainage noted to left leg. Redness noted to rosita wound on left leg. Pt c/o increased pain. See avs for discharge instructions. Next appt in 1 week. Care plan reviewed with patient. Patient verbalize understanding of the plan of care and contribute to goal setting.

## 2020-07-02 ENCOUNTER — TELEPHONE (OUTPATIENT)
Dept: WOUND CARE | Age: 50
End: 2020-07-02

## 2020-07-02 ASSESSMENT — ENCOUNTER SYMPTOMS
ABDOMINAL PAIN: 0
TROUBLE SWALLOWING: 0
VOMITING: 0
NAUSEA: 0
COUGH: 0
SORE THROAT: 0
CHOKING: 0
DIARRHEA: 0
EYE ITCHING: 0
CONSTIPATION: 0
SHORTNESS OF BREATH: 0

## 2020-07-03 ENCOUNTER — HOSPITAL ENCOUNTER (EMERGENCY)
Age: 50
Discharge: HOME | End: 2020-07-03
Payer: MEDICARE

## 2020-07-03 VITALS
WEIGHT: 220 LBS | DIASTOLIC BLOOD PRESSURE: 73 MMHG | BODY MASS INDEX: 35.51 KG/M2 | TEMPERATURE: 99 F | HEART RATE: 87 BPM | OXYGEN SATURATION: 98 % | SYSTOLIC BLOOD PRESSURE: 112 MMHG | RESPIRATION RATE: 19 BRPM

## 2020-07-03 LAB
ALBUMIN SERPL-MCNC: 3.6 G/DL (ref 3.5–5.1)
ALP BLD-CCNC: 84 U/L (ref 38–126)
ALT SERPL-CCNC: 25 U/L (ref 11–66)
ANION GAP SERPL CALCULATED.3IONS-SCNC: 18 MEQ/L (ref 8–16)
AST SERPL-CCNC: 32 U/L (ref 5–40)
BASOPHILS # BLD: 0.6 %
BASOPHILS ABSOLUTE: 0.1 THOU/MM3 (ref 0–0.1)
BILIRUB SERPL-MCNC: 0.3 MG/DL (ref 0.3–1.2)
BUN BLDV-MCNC: 13 MG/DL (ref 7–22)
CALCIUM SERPL-MCNC: 9 MG/DL (ref 8.5–10.5)
CHLORIDE BLD-SCNC: 90 MEQ/L (ref 98–111)
CO2: 22 MEQ/L (ref 23–33)
CREAT SERPL-MCNC: 0.8 MG/DL (ref 0.4–1.2)
EOSINOPHIL # BLD: 3.4 %
EOSINOPHILS ABSOLUTE: 0.5 THOU/MM3 (ref 0–0.4)
ERYTHROCYTE [DISTWIDTH] IN BLOOD BY AUTOMATED COUNT: 14.5 % (ref 11.5–14.5)
ERYTHROCYTE [DISTWIDTH] IN BLOOD BY AUTOMATED COUNT: 45.6 FL (ref 35–45)
GFR SERPL CREATININE-BSD FRML MDRD: > 90 ML/MIN/1.73M2
GLUCOSE BLD-MCNC: 546 MG/DL (ref 70–108)
GLUCOSE BLD-MCNC: 570 MG/DL (ref 70–108)
HCT VFR BLD CALC: 42.5 % (ref 42–52)
HEMOGLOBIN: 14.4 GM/DL (ref 14–18)
IMMATURE GRANS (ABS): 0.16 THOU/MM3 (ref 0–0.07)
IMMATURE GRANULOCYTES: 1.2 %
LACTIC ACID, SEPSIS: 4.8 MMOL/L (ref 0.5–1.9)
LYMPHOCYTES # BLD: 22.4 %
LYMPHOCYTES ABSOLUTE: 3.1 THOU/MM3 (ref 1–4.8)
MCH RBC QN AUTO: 29.6 PG (ref 26–33)
MCHC RBC AUTO-ENTMCNC: 33.9 GM/DL (ref 32.2–35.5)
MCV RBC AUTO: 87.3 FL (ref 80–94)
MONOCYTES # BLD: 6.4 %
MONOCYTES ABSOLUTE: 0.9 THOU/MM3 (ref 0.4–1.3)
NUCLEATED RED BLOOD CELLS: 0 /100 WBC
OSMOLALITY CALCULATION: 287.1 MOSMOL/KG (ref 275–300)
PLATELET # BLD: 291 THOU/MM3 (ref 130–400)
PMV BLD AUTO: 9.2 FL (ref 9.4–12.4)
POTASSIUM SERPL-SCNC: 4.2 MEQ/L (ref 3.5–5.2)
PROCALCITONIN: 0.11 NG/ML (ref 0.01–0.09)
RBC # BLD: 4.87 MILL/MM3 (ref 4.7–6.1)
SEG NEUTROPHILS: 66 %
SEGMENTED NEUTROPHILS ABSOLUTE COUNT: 9 THOU/MM3 (ref 1.8–7.7)
SODIUM BLD-SCNC: 130 MEQ/L (ref 135–145)
TOTAL PROTEIN: 7.6 G/DL (ref 6.1–8)
WBC # BLD: 13.7 THOU/MM3 (ref 4.8–10.8)

## 2020-07-03 PROCEDURE — 83605 ASSAY OF LACTIC ACID: CPT

## 2020-07-03 PROCEDURE — 6370000000 HC RX 637 (ALT 250 FOR IP): Performed by: PHYSICIAN ASSISTANT

## 2020-07-03 PROCEDURE — 99282 EMERGENCY DEPT VISIT SF MDM: CPT

## 2020-07-03 PROCEDURE — 82948 REAGENT STRIP/BLOOD GLUCOSE: CPT

## 2020-07-03 PROCEDURE — 96374 THER/PROPH/DIAG INJ IV PUSH: CPT

## 2020-07-03 PROCEDURE — 36415 COLL VENOUS BLD VENIPUNCTURE: CPT

## 2020-07-03 PROCEDURE — 84145 PROCALCITONIN (PCT): CPT

## 2020-07-03 PROCEDURE — 80053 COMPREHEN METABOLIC PANEL: CPT

## 2020-07-03 PROCEDURE — 85025 COMPLETE CBC W/AUTO DIFF WBC: CPT

## 2020-07-03 RX ORDER — TRAMADOL HYDROCHLORIDE 50 MG/1
50 TABLET ORAL EVERY 6 HOURS PRN
Qty: 10 TABLET | Refills: 0 | Status: SHIPPED | OUTPATIENT
Start: 2020-07-03 | End: 2020-07-06

## 2020-07-03 RX ORDER — 0.9 % SODIUM CHLORIDE 0.9 %
1000 INTRAVENOUS SOLUTION INTRAVENOUS ONCE
Status: DISCONTINUED | OUTPATIENT
Start: 2020-07-03 | End: 2020-07-04 | Stop reason: HOSPADM

## 2020-07-03 RX ADMIN — Medication 10 UNITS: at 21:33

## 2020-07-03 ASSESSMENT — PAIN SCALES - GENERAL: PAINLEVEL_OUTOF10: 9

## 2020-07-03 ASSESSMENT — ENCOUNTER SYMPTOMS
PHOTOPHOBIA: 0
VOMITING: 0
SHORTNESS OF BREATH: 0
NAUSEA: 1
BACK PAIN: 0
RHINORRHEA: 0

## 2020-07-03 ASSESSMENT — PAIN DESCRIPTION - PAIN TYPE: TYPE: ACUTE PAIN

## 2020-07-03 ASSESSMENT — PAIN DESCRIPTION - LOCATION: LOCATION: LEG;FINGER (COMMENT WHICH ONE)

## 2020-07-03 NOTE — ED TRIAGE NOTES
Patient complains has a staph infection. Patient sees Dr. Leticia White for infection control. Patient on antibiotics but doesn't feel like they are working. Patient has a spot on the left shine. And to right hand ring finger a scabbed spot.

## 2020-07-04 NOTE — CONSULTS
Podiatric Surgery Consult    CC:  Left shin wound    HPI:  The patient is a 44 y.o. male with significant past medical history of DM2, prior right BKA, bipolar disorder who was seen at the ED this evening. The patient arrived at the ED with complaint of the left shin wound discharging pus. The patient states he bumped his left leg last week, and that his PCP noticed the ulcer last Wednesday. His wound care nurse has been dressing the wound with betadine, dry sterile dressing and kelrix. The patient last seen by wound care last Wednesday (7/1/2020) and has a follow up next week. Patient is currently on Augmentin for the infection on the left shin wound. Patient states he has had nausea starting last week; denies V/F/C/SOB/CP or claudication. Denies any other pedal complaints at this time. Past Medical History:        Diagnosis Date    Bipolar 2 disorder [facility])     previously followed with Dr. Sahil Ibarra and Gentry Patel in [facility] BPH (benign prostatic hyperplasia)     Diabetes mellitus type 2, uncontrolled ([address]     HgbA1c on 4/2/2019 was 9.1.     Diabetic peripheral neuropathy (HCC)     Diabetic polyneuropathy ([address]     Diabetic ulcer of right foot associated with type 2 diabetes mellitus ([address] 12/10/2015    Essential hypertension     \"never been on b/p medication that I know of\"    GERD (gastroesophageal reflux disease)     Hammer toe of left foot     Heart murmur     denies any chest pain or palpitations    History of tobacco abuse     Hx of AKA (above knee amputation), right ([address] 04/18/2019    Dr. Kaur Aliment edema, diabetic, bilateral ([address] 05/04/2018    Dr. Juana Tobias referred to retina specialist for 2nd opinion    Marijuana abuse in remission     Melena     MRSA (methicillin resistant staph aureus) culture positive     Onychomycosis     Other disorders of kidney and ureter in diseases classified elsewhere     Sleep apnea     no cpap    Thyroid disease     WD-Skin ulcer of fourth toe of right foot with necrosis of bone ([facility] 6/29/2016       Past Surgical History:        Procedure Laterality Date    ABSCESS DRAINAGE Right     foot    AMPUTATION ABOVE KNEE Right 4/18/2019    RIGHT ABOVE KNEE AMPUTATION performed by Rogelio Martinez MD at [address] [facility], LAPAROSCOPIC N/A 4/1/2020    ROBOTIC CHOLECYSTECTOMY performed by Refugio Corona MD at [address] [facility] [address], DIAGNOSTIC      FOOT DEBRIDEMENT Right 07/01/2016    I & D    INCISION AND DRAINAGE Right 2/18/2019    I&D RIGHT STUMP performed by Rogelio Martinez MD at [id number] [facility] Right 07/20/2016    LEG AMPUTATION BELOW KNEE Right 4/4/2019    I&D AND REVISION OF AMPUTATION RIGHT LEG performed by Rogelio Martinez MD at [address] [facility] Right 1/14/15    sole of foot I&D    [place] DRAIN INFECT SHOULDER BURSA Left 8/18/2017    LEFT SHOULDER INCISION AND DRAINAGE performed by Rogelio Martinez MD at [address] [facility] OFFICE/OUTPT [facility] Right 9/20/2018    EXCISIONAL DEBRIDEMENT RIGHT BKA STUMP performed by oBbbi Alfaro MD at [place] [address] Right 1/16/15    2nd toe with wound vac applied    UPPER GASTROINTESTINAL ENDOSCOPY N/A 3/3/2020    EGD DILATION SAVORY performed by Dasia Polanco MD at [address] [facility]  ? when       Current Medications:    Current Facility-Administered Medications: 0.9 % sodium chloride bolus, 1,000 mL, Intravenous, Once  insulin regular (HUMULIN R;NOVOLIN R) injection 10 Units, 10 Units, Intravenous, Once    Allergies:  Pcn [penicillins]; Clindamycin/lincomycin; Vancomycin; and Actos [pioglitazone]    Social History:    reports that he quit smoking about 19 years ago. His smoking use included cigars. He started smoking about 35 years ago. He has a 10.00 pack-year smoking history. He has never used smokeless tobacco. He reports previous alcohol use.  He reports that he does not use drugs.       Family History:       Problem Relation Age of Onset    Diabetes Mother     Other Mother         pneumonia, H1N1    Depression Mother     Early Death Mother     High Blood Pressure Mother     High Cholesterol Mother     Vision Loss Maternal Grandmother     Arthritis Maternal Grandfather     Heart Disease Maternal Grandfather        REVIEW OF SYSTEMS:    Constitutional: Negative for fever, chills, and sudden weight loss or gain. HEENT: Negative for blurry/double vision, ears, nose, or throat pain. Negative for mass. Respiratory: Negative for shortness of breath or hemoptysis. Cardiovascular: Negative for angina, palpitations, or lower extremity edema. Gastrointestinal: Negative for nausea, vomiting, diarrhea, constipation, or abdominal pain. Genitourinary: Negative for increased urination, burning with urination, or hematuria. Musculoskeletal: See above HPI. Integument: See above HPI. Hematology: Negative for easy bruising or easy bleeding. Physical Examination:  General: Awake, alert, and oriented. In no acute distress. Resting in bed. HEENT: Atraumatic, normocephalic. Respiratory: CTA. No rales, rhonchi, or wheezing. Cardiovascular: RRR. Normal S1, S2.  Abdomen: Soft, non-tender, non-distended. Bowel sounds present x4 quadrants. Focused Lower Extremity Examination:    Vitals:    BP (!) 142/75   Pulse 118   Temp 99 °F (37.2 °C) (Oral)   Resp 20   Wt 220 lb (99.8 kg)   SpO2 99%   BMI 35.51 kg/m²      Dermatologic: Full thickness ulcer noted to anterior of left tibia. Base mostly granular with some fibrotic tissue noted. Periwound area erythematous with very slight amount of purulent drainage noted between healing dermis and sloughed-off skin. Healing superficial bulla noted on lateral left calf. Nails 1-5 of left are thickened, elongated and dystrophic. Webspaces 1-4 of left are full of debris. Vascular: Dorsalis pedis and posterior tibial pulses are palpable to LLE.  Skin temperature is warm to warm from proximal tibial tuberosity to distal digits of LLE. CFT less than 3 seconds to all 5 digits of LLE. No edema noted. Neurovascular: Light touch sensation absent to approx. 10cm inferior to tibial tuberosity. Musculoskeletal: Prior BKA of RLE. STUDIES:            LABS:   Recent Labs     07/03/20 2019   WBC 13.7*   HGB 14.4   HCT 42.5           Recent Labs     07/03/20 2019   *   K 4.2   CL 90*   CO2 22*   BUN 13   CREATININE 0.8        Recent Labs     07/03/20 2019   PROT 7.6      No results for input(s): CKTOTAL, CKMB, CKMBINDEX, TROPONINI in the last 72 hours. Assessment:  Left shin wound    Chronic  Patient Active Problem List   Diagnosis    Cellulitis    Status post below knee amputation of right lower extremity (HCC)    Gait disturbance    Sebaceous cyst    Uncontrolled type 2 diabetes mellitus with hyperglycemia, with long-term current use of insulin [facility]    Severe bipolar II disorder, recent episode major depressive, remission ([facility]    Bipolar 1 disorder ([facility])    Leukocytosis    Lactic acidosis    Hyponatremia    Normocytic anemia    Obesity (BMI 30-39. 9)    History of noncompliance with medical treatment    Essential hypertension    Cough    Tobacco dependence    Gastroesophageal reflux disease without esophagitis    History of marijuana use    Physical debility    Anemia    Electrolyte imbalance    Type 2 diabetes mellitus with hyperglycemia, with long-term current use of insulin ([facility])    Weakness    Infection of above knee amputation stump of right leg (HCC)    Above knee amputation of right lower extremity (HCC)    Sepsis ([facility])    Vitamin D deficiency    COPD exacerbation (HCC)    Influenza B    Depression, recurrent (HCC)    Major depressive disorder, recurrent ([facility])    GI bleed    Elevated lipase    Pressure ulcer of left calf, unstageable ([facility])    Other psychoactive substance abuse, uncomplicated ([address]    Calculus of gallbladder without cholecystitis without obstruction    Right upper quadrant abdominal pain    Cellulitis of hand    Pedal edema    MURALI (obstructive sleep apnea)    Shortness of breath    Hypothyroid    Anxiety and depression    Dyspnea    Sinus tachycardia    Metabolic acidosis    Diabetic ulcer of lower extremity (HCC)    Ulcer, skin, non-healing (HCC)    Streptococcus infection, group A       Plan  1. Left shin wound  - Patient initially examined and evaluated. - Applied betadine to wound and dressed with gauze, kerlix  - Reviewed labs and vitals  - Patient instructed to finish taking his antibiotics and continue with wound care  - No surgical intervention necessary at this time. - Follow up with Dr. Penny Toht NP as scheduled. Echoar, thank you for the consultation, allowing podiatry to assist in the medical welfare of this patient. Podiatry will continue to follow this patient throughout the duration of hospitalization. Genesis Angeles DPM, PGY-1  Foot and Ankle Surgical Resident  7/3/2020 9:22 PM     Please refer to resident physicians note for further details. Discussed with resident physician assessment and findings via telephone, I agree with plan as documented. Patient to follow up in outpatient setting.      Shayla Merrill [facility]   Electronically signed by Meryl Jennings DPM on 7/6/2020 at 11:49 AM

## 2020-07-04 NOTE — ED PROVIDER NOTES
[facility] EMERGENCY DEPT      CHIEF COMPLAINT       Chief Complaint   Patient presents with    Other     wound infection       Nurses Notes reviewed and I agree except as noted in the HPI. HISTORY OF PRESENT ILLNESS    Danyell Salomon is a 44 y.o. male who presents for left lower extremity wound evaluation and debridement. Patient reports his PCP noted a wound to his left lower extremity about a week ago. He is unsure how he acquired it. He has neuropathy and does not feel pain. His PCP cultured it but he was not put on antibiotics at that time. On June 30 he was seen at wound care and put on Augmentin. He feels the wound has not changed, possibly gotten a little worse and therefore presented here for debridement. He also mentions that he has had a wound to his right second digit for 2 to 3 months which also is being followed by wound care. He reports that it is painful. He endorses chills, mild subjective fevers, and intermittent nausea. REVIEW OF SYSTEMS     Review of Systems   Constitutional: Positive for chills and fever (Patient is unsure; feels warm at times; has not taken his temperature). HENT: Negative for rhinorrhea. Eyes: Negative for photophobia. Respiratory: Negative for shortness of breath. Cardiovascular: Negative for chest pain. Gastrointestinal: Positive for nausea. Negative for vomiting. Musculoskeletal: Positive for arthralgias and myalgias. Negative for back pain and neck pain. Skin: Positive for wound. Negative for rash. Neurological: Negative for weakness and numbness. Psychiatric/Behavioral: Negative for confusion.         PAST MEDICAL HISTORY    has a past medical history of Bipolar 2 disorder ([address] BPH (benign prostatic hyperplasia), Diabetes mellitus type 2, uncontrolled ([address] Diabetic peripheral neuropathy ([address] Diabetic polyneuropathy ([address] Diabetic ulcer of right foot associated with type 2 diabetes mellitus ([address] Essential hypertension, daily for 30 days. , Disp-90 capsule, R-0Normal      lisinopril (PRINIVIL;ZESTRIL) 5 MG tablet Take 1 tablet by mouth daily, Disp-30 tablet, R-0Normal      insulin detemir (LEVEMIR FLEXTOUCH) 100 UNIT/ML injection pen Inject 85 Units into the skin 2 times daily, Disp-30 pen, R-5Normal      ondansetron (ZOFRAN) 4 MG tablet Take 1 tablet by mouth every 8 hours as needed for Nausea or Vomiting, Disp-30 tablet, R-0Normal      Lancets MISC Disp-200 each, R-0, NormalUse to test blood sugars 4 times daily DX:E11.65      AMINO ACIDS COMPLEX PO Take 1 each by mouth daily Daily AA drinkHistorical Med      blood glucose monitor kit and supplies Accu Chek Sheila meter. Use to test blood sugars DX:E11.65, Disp-1 kit, R-0, Normal      blood glucose monitor strips Accucheck Sheila Test Strips Test blood sugars 4 times daily DX:E11.65, Disp-400 strip, R-5, Normal      Insulin Syringe-Needle U-100 29G X 1/2\" 0.3 ML MISC 4 TIMES DAILY AFTER MEALS AND BEFORE BEDTIME Starting 2019, Disp-200 each, R-0, Normal             ALLERGIES     is allergic to pcn [penicillins]; clindamycin/lincomycin; vancomycin; and actos [pioglitazone]. FAMILY HISTORY     He indicated that his mother is . He reported the following about his father: unknown. He indicated that the status of his maternal grandmother is unknown. He indicated that the status of his maternal grandfather is unknown.   family history includes Arthritis in his maternal grandfather; Depression in his mother; Diabetes in his mother; Early Death in his mother; Heart Disease in his maternal grandfather; High Blood Pressure in his mother; High Cholesterol in his mother; Other in his mother; Vision Loss in his maternal grandmother. SOCIAL HISTORY    reports that he quit smoking about 19 years ago. His smoking use included cigars. He started smoking about 35 years ago. He has a 10.00 pack-year smoking history.  He has never used smokeless tobacco. He reports previous alcohol use. He reports that he does not use drugs. PHYSICAL EXAM     INITIAL VITALS:  weight is 220 lb (99.8 kg). His oral temperature is 99 °F (37.2 °C). His blood pressure is 112/73 and his pulse is 87. His respiration is 19 and oxygen saturation is 98%. Physical Exam  Vitals signs and nursing note reviewed. Constitutional:       General: He is not in acute distress. Appearance: He is well-developed. He is not toxic-appearing or diaphoretic. HENT:      Head: Normocephalic and atraumatic. Right Ear: Hearing normal.      Left Ear: Hearing normal.      Nose: Nose normal. No nasal deformity. Eyes:      General: Lids are normal.      Conjunctiva/sclera: Conjunctivae normal.   Neck:      Musculoskeletal: No neck rigidity. Trachea: Trachea normal. No tracheal deviation. Cardiovascular:      Rate and Rhythm: Normal rate and regular rhythm. Pulses:           Radial pulses are 2+ on the right side and 2+ on the left side. Dorsalis pedis pulses are 2+ on the right side and 2+ on the left side. Posterior tibial pulses are 2+ on the right side and 2+ on the left side. Pulmonary:      Effort: Pulmonary effort is normal. No tachypnea or respiratory distress. Breath sounds: No stridor. Abdominal:      General: There is no distension. Palpations: Abdomen is soft. Musculoskeletal:      Comments: Right AKA noted   Skin:     General: Skin is warm and dry. Coloration: Skin is not pale. Findings: Erythema and wound present. No abrasion, ecchymosis or rash. Comments: Wound to anterior left lower extremity with surrounding erythema and some superficial purulent drainage noted. Area is nontender. Healing wound noted to inner aspect of right second digit over the ulnar aspect of the PIP joint. There is no associated erythema, mild swelling at best, with no appreciable tenderness. Good range of motion appreciated.    Neurological:      Mental Status: He is alert and oriented to person, place, and time. GCS: GCS eye subscore is 4. GCS verbal subscore is 5. GCS motor subscore is 6. Sensory: No sensory deficit. Gait: Gait normal.   Psychiatric:         Speech: Speech normal.         Behavior: Behavior normal. Behavior is cooperative. Thought Content: Thought content normal.         DIFFERENTIAL DIAGNOSIS:   Including but not limited to: Wound infection, wound check, uncontrolled diabetes    DIAGNOSTIC RESULTS     EKG: All E[facility]'s are [facility] who either signs or Co-signs this chart in the absence of a cardiologist.  None    RADIOLOGY: non-plain film images(s) such as CT,Ultrasound and MRI are read by the radiologist.  Plain radiographic images are visualized and preliminarily interpreted by the emergency physician unless otherwise stated below.   No orders to display       LABS:   Labs Reviewed   CBC WITH AUTO DIFFERENTIAL - Abnormal; Notable for the following components:       Result Value    WBC 13.7 (*)     RDW-SD 45.6 (*)     MPV 9.2 (*)     Segs Absolute 9.0 (*)     Eosinophils Absolute 0.5 (*)     Immature Grans (Abs) 0.16 (*)     All other components within normal limits   COMPREHENSIVE METABOLIC PANEL - Abnormal; Notable for the following components:    Glucose 570 (*)     Sodium 130 (*)     Chloride 90 (*)     CO2 22 (*)     All other components within normal limits   PROCALCITONIN - Abnormal; Notable for the following components:    Procalcitonin 0.11 (*)     All other components within normal limits   LACTATE, SEPSIS - Abnormal; Notable for the following components:    Lactic Acid, Sepsis 4.8 (*)     All other components within normal limits   ANION GAP - Abnormal; Notable for the following components:    Anion Gap 18.0 (*)     All other components within normal limits   POCT GLUCOSE - Abnormal; Notable for the following components:    POC Glucose 546 (*)     All other components within normal limits GLOMERULAR FILTRATION RATE, ESTIMATED   OSMOLALITY   POCT GLUCOSE   POCT GLUCOSE       EMERGENCY DEPARTMENT COURSE:   Vitals:    Vitals:    07/03/20 1902 07/03/20 2137   BP: (!) 142/75 112/73   Pulse: 118 87   Resp: 20 19   Temp: 99 °F (37.2 °C)    TempSrc: Oral    SpO2: 99% 98%   Weight: 220 lb (99.8 kg)      2130: Blanche Low Dr. Fany Junior of podiatry    MDM:  Patient was seen by me in the emergency department for wound to his left lower extremity and chronic wound to the right second digit. Physical exam revealed a nontoxic-appearing patient with apparent wound infection to the left lower extremity but well-healing wound to the right second digit. The patient was neurovascularly intact. Vital signs reviewed and noted stable. Old records were reviewed. Appropriate laboratory studies were ordered and reviewed upon completion. Imaging was not deemed necessary. Labs were pertinent for leukocytosis of 13.7 and hyperglycemia at 570. Dr. Fany Junior of our podiatry service was consulted, came to the ER, and evaluated the patient. He cleansed and dressed the wound and advised outpatient follow-up at wound care. Patient was given insulin. He refused IV. He also declined any further treatment in regards to insulin stating he would treat his hyperglycemia at home. Patient was comfortable plan of discharge home. I have given the patient strict written and verbal instructions about care at home, follow-up, and signs and symptoms of worsening of condition and they did verbalize understanding. CRITICAL CARE:   None    CONSULTS:  Dr. Fany Junior (podiatry)    PROCEDURES:  None    FINAL IMPRESSION      1. Open wound of left knee, leg, and ankle, initial encounter    2. Uncontrolled type 2 diabetes mellitus with hyperglycemia ([facility]    3. Infected open wound    4. Open wound of finger, initial encounter          DISPOSITION/PLAN     1. Open wound of left knee, leg, and ankle, initial encounter    2.  Uncontrolled

## 2020-07-06 ENCOUNTER — HOSPITAL ENCOUNTER (EMERGENCY)
Age: 50
Discharge: HOME | End: 2020-07-06
Attending: EMERGENCY MEDICINE
Payer: MEDICARE

## 2020-07-06 ENCOUNTER — TELEPHONE (OUTPATIENT)
Dept: WOUND CARE | Age: 50
End: 2020-07-06

## 2020-07-06 VITALS
HEIGHT: 66 IN | RESPIRATION RATE: 14 BRPM | WEIGHT: 222 LBS | BODY MASS INDEX: 35.68 KG/M2 | OXYGEN SATURATION: 100 % | SYSTOLIC BLOOD PRESSURE: 145 MMHG | TEMPERATURE: 98.1 F | HEART RATE: 99 BPM | DIASTOLIC BLOOD PRESSURE: 79 MMHG

## 2020-07-06 LAB
ALBUMIN SERPL-MCNC: 3.6 G/DL (ref 3.5–5.1)
ALP BLD-CCNC: 70 U/L (ref 38–126)
ALT SERPL-CCNC: 28 U/L (ref 11–66)
ANION GAP SERPL CALCULATED.3IONS-SCNC: 12 MEQ/L (ref 8–16)
AST SERPL-CCNC: 31 U/L (ref 5–40)
AVERAGE GLUCOSE: 267 MG/DL (ref 70–126)
BASOPHILS # BLD: 0.5 %
BASOPHILS ABSOLUTE: 0.1 THOU/MM3 (ref 0–0.1)
BETA-HYDROXYBUTYRATE: 1.38 MG/DL (ref 0.2–2.81)
BILIRUB SERPL-MCNC: 0.4 MG/DL (ref 0.3–1.2)
BILIRUBIN DIRECT: < 0.2 MG/DL (ref 0–0.3)
BUN BLDV-MCNC: 7 MG/DL (ref 7–22)
CALCIUM SERPL-MCNC: 8.8 MG/DL (ref 8.5–10.5)
CHLORIDE BLD-SCNC: 96 MEQ/L (ref 98–111)
CO2: 26 MEQ/L (ref 23–33)
CREAT SERPL-MCNC: 0.6 MG/DL (ref 0.4–1.2)
EOSINOPHIL # BLD: 3.2 %
EOSINOPHILS ABSOLUTE: 0.4 THOU/MM3 (ref 0–0.4)
ERYTHROCYTE [DISTWIDTH] IN BLOOD BY AUTOMATED COUNT: 14.6 % (ref 11.5–14.5)
ERYTHROCYTE [DISTWIDTH] IN BLOOD BY AUTOMATED COUNT: 46.9 FL (ref 35–45)
GFR SERPL CREATININE-BSD FRML MDRD: > 90 ML/MIN/1.73M2
GLUCOSE BLD-MCNC: 133 MG/DL (ref 70–108)
GLUCOSE BLD-MCNC: 137 MG/DL (ref 70–108)
HBA1C MFR BLD: 10.9 % (ref 4.4–6.4)
HCT VFR BLD CALC: 43.2 % (ref 42–52)
HEMOGLOBIN: 14.4 GM/DL (ref 14–18)
IMMATURE GRANS (ABS): 0.16 THOU/MM3 (ref 0–0.07)
IMMATURE GRANULOCYTES: 1.4 %
LACTIC ACID: 2.8 MMOL/L (ref 0.5–2.2)
LYMPHOCYTES # BLD: 22.5 %
LYMPHOCYTES ABSOLUTE: 2.5 THOU/MM3 (ref 1–4.8)
MCH RBC QN AUTO: 29.5 PG (ref 26–33)
MCHC RBC AUTO-ENTMCNC: 33.3 GM/DL (ref 32.2–35.5)
MCV RBC AUTO: 88.5 FL (ref 80–94)
MONOCYTES # BLD: 5.4 %
MONOCYTES ABSOLUTE: 0.6 THOU/MM3 (ref 0.4–1.3)
NUCLEATED RED BLOOD CELLS: 0 /100 WBC
OSMOLALITY CALCULATION: 268.4 MOSMOL/KG (ref 275–300)
PLATELET # BLD: 252 THOU/MM3 (ref 130–400)
PMV BLD AUTO: 9.2 FL (ref 9.4–12.4)
POTASSIUM SERPL-SCNC: 3.9 MEQ/L (ref 3.5–5.2)
PROCALCITONIN: 0.1 NG/ML (ref 0.01–0.09)
RBC # BLD: 4.88 MILL/MM3 (ref 4.7–6.1)
SEG NEUTROPHILS: 67 %
SEGMENTED NEUTROPHILS ABSOLUTE COUNT: 7.4 THOU/MM3 (ref 1.8–7.7)
SODIUM BLD-SCNC: 134 MEQ/L (ref 135–145)
TOTAL PROTEIN: 7.8 G/DL (ref 6.1–8)
TROPONIN T: < 0.01 NG/ML
WBC # BLD: 11.1 THOU/MM3 (ref 4.8–10.8)

## 2020-07-06 PROCEDURE — 82248 BILIRUBIN DIRECT: CPT

## 2020-07-06 PROCEDURE — 82948 REAGENT STRIP/BLOOD GLUCOSE: CPT

## 2020-07-06 PROCEDURE — 83605 ASSAY OF LACTIC ACID: CPT

## 2020-07-06 PROCEDURE — 82010 KETONE BODYS QUAN: CPT

## 2020-07-06 PROCEDURE — 84145 PROCALCITONIN (PCT): CPT

## 2020-07-06 PROCEDURE — 2580000003 HC RX 258: Performed by: EMERGENCY MEDICINE

## 2020-07-06 PROCEDURE — 80053 COMPREHEN METABOLIC PANEL: CPT

## 2020-07-06 PROCEDURE — 85025 COMPLETE CBC W/AUTO DIFF WBC: CPT

## 2020-07-06 PROCEDURE — 99283 EMERGENCY DEPT VISIT LOW MDM: CPT

## 2020-07-06 PROCEDURE — 84484 ASSAY OF TROPONIN QUANT: CPT

## 2020-07-06 PROCEDURE — 83036 HEMOGLOBIN GLYCOSYLATED A1C: CPT

## 2020-07-06 PROCEDURE — 87040 BLOOD CULTURE FOR BACTERIA: CPT

## 2020-07-06 PROCEDURE — 36415 COLL VENOUS BLD VENIPUNCTURE: CPT

## 2020-07-06 RX ORDER — GLUCOSAMINE HCL/CHONDROITIN SU 500-400 MG
CAPSULE ORAL
Qty: 400 STRIP | Refills: 5 | Status: SHIPPED | OUTPATIENT
Start: 2020-07-06 | End: 2020-09-11 | Stop reason: SDUPTHER

## 2020-07-06 RX ORDER — LEVOTHYROXINE SODIUM 0.05 MG/1
50 TABLET ORAL DAILY
Qty: 30 TABLET | Refills: 5 | Status: SHIPPED | OUTPATIENT
Start: 2020-07-06 | End: 2020-11-16 | Stop reason: SDUPTHER

## 2020-07-06 RX ORDER — LANCETS 30 GAUGE
EACH MISCELLANEOUS
Qty: 400 EACH | Refills: 5 | Status: SHIPPED | OUTPATIENT
Start: 2020-07-06 | End: 2020-09-11 | Stop reason: SDUPTHER

## 2020-07-06 RX ORDER — 0.9 % SODIUM CHLORIDE 0.9 %
500 INTRAVENOUS SOLUTION INTRAVENOUS ONCE
Status: COMPLETED | OUTPATIENT
Start: 2020-07-06 | End: 2020-07-06

## 2020-07-06 RX ORDER — GABAPENTIN 300 MG/1
300 CAPSULE ORAL 3 TIMES DAILY
Qty: 90 CAPSULE | Refills: 1 | Status: SHIPPED | OUTPATIENT
Start: 2020-07-06 | End: 2020-07-28 | Stop reason: SDUPTHER

## 2020-07-06 RX ORDER — SODIUM CHLORIDE 9 MG/ML
INJECTION, SOLUTION INTRAVENOUS CONTINUOUS
Status: DISCONTINUED | OUTPATIENT
Start: 2020-07-06 | End: 2020-07-06 | Stop reason: HOSPADM

## 2020-07-06 RX ORDER — INSULIN ASPART 100 [IU]/ML
25 INJECTION, SOLUTION INTRAVENOUS; SUBCUTANEOUS
Qty: 22.5 ML | Refills: 5 | Status: SHIPPED | OUTPATIENT
Start: 2020-07-06 | End: 2020-09-14 | Stop reason: SDUPTHER

## 2020-07-06 RX ADMIN — SODIUM CHLORIDE: 9 INJECTION, SOLUTION INTRAVENOUS at 14:27

## 2020-07-06 RX ADMIN — SODIUM CHLORIDE 500 ML: 9 INJECTION, SOLUTION INTRAVENOUS at 13:18

## 2020-07-06 ASSESSMENT — ENCOUNTER SYMPTOMS
DIARRHEA: 0
NAUSEA: 0
SINUS PRESSURE: 0
CHEST TIGHTNESS: 0
ABDOMINAL PAIN: 0
VOICE CHANGE: 0
BACK PAIN: 0
WHEEZING: 0
CONSTIPATION: 0
SORE THROAT: 0
VOMITING: 0
SHORTNESS OF BREATH: 0
RHINORRHEA: 0
TROUBLE SWALLOWING: 0
COUGH: 0

## 2020-07-06 ASSESSMENT — PAIN DESCRIPTION - ONSET: ONSET: PROGRESSIVE

## 2020-07-06 ASSESSMENT — PAIN DESCRIPTION - LOCATION: LOCATION: FINGER (COMMENT WHICH ONE)

## 2020-07-06 ASSESSMENT — PAIN DESCRIPTION - PAIN TYPE: TYPE: ACUTE PAIN

## 2020-07-06 ASSESSMENT — PAIN DESCRIPTION - ORIENTATION: ORIENTATION: RIGHT

## 2020-07-06 ASSESSMENT — PAIN SCALES - GENERAL: PAINLEVEL_OUTOF10: 10

## 2020-07-06 ASSESSMENT — PAIN DESCRIPTION - FREQUENCY: FREQUENCY: CONTINUOUS

## 2020-07-06 NOTE — TELEPHONE ENCOUNTER
[facility] wound clinic called stating a script for amoxicillin was routed to them from ID clinic. Advised nurse pt was seen last week and script was sent to [address] [place] [address] and it was finalized. Nurse voiced understanding.
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
## 2020-07-06 NOTE — ED NOTES
Pt resting on cot, lights off, call light within reach. Pt expressed no needs at this time. Will continue to monitor. VSS, no distress noted.      Mau Maddox RN  07/06/20 3033

## 2020-07-06 NOTE — ED NOTES
Pt resting on cot with lights off, watching tv, call light within reach. VSS. Pt expressed no needs at this time.      Nasrin Sanderson RN  07/06/20 0599

## 2020-07-07 ENCOUNTER — CARE COORDINATION (OUTPATIENT)
Dept: CASE MANAGEMENT | Age: 50
End: 2020-07-07

## 2020-07-07 NOTE — CARE COORDINATION
[facility] ED Follow Up Call    2020    Patient: Marilou Cantu Patient : 1970   MRN: [id number]  Reason for Admission: blood infection  Discharge Date: 20    Attempted ED call. Left message to return call.          Care Transitions ED Follow Up    Care Transitions Interventions        Patient DME:  Shower chair, Walker, Wheelchair, Other   Other Patient DME:  Entrance Ramp

## 2020-07-08 ENCOUNTER — HOSPITAL ENCOUNTER (OUTPATIENT)
Dept: WOUND CARE | Age: 50
Discharge: HOME | End: 2020-07-08
Payer: MEDICARE

## 2020-07-08 ENCOUNTER — HOSPITAL ENCOUNTER (OUTPATIENT)
Dept: GENERAL RADIOLOGY | Age: 50
Discharge: HOME | End: 2020-07-08
Payer: MEDICARE

## 2020-07-08 ENCOUNTER — HOSPITAL ENCOUNTER (OUTPATIENT)
Age: 50
Discharge: HOME | End: 2020-07-08
Payer: MEDICARE

## 2020-07-08 ENCOUNTER — CARE COORDINATION (OUTPATIENT)
Dept: CASE MANAGEMENT | Age: 50
End: 2020-07-08

## 2020-07-08 VITALS
HEIGHT: 66 IN | BODY MASS INDEX: 35.68 KG/M2 | SYSTOLIC BLOOD PRESSURE: 145 MMHG | RESPIRATION RATE: 18 BRPM | OXYGEN SATURATION: 98 % | WEIGHT: 222 LBS | TEMPERATURE: 98.3 F | HEART RATE: 110 BPM | DIASTOLIC BLOOD PRESSURE: 66 MMHG

## 2020-07-08 PROCEDURE — 99213 OFFICE O/P EST LOW 20 MIN: CPT | Performed by: NURSE PRACTITIONER

## 2020-07-08 PROCEDURE — 73140 X-RAY EXAM OF FINGER(S): CPT

## 2020-07-08 PROCEDURE — 99213 OFFICE O/P EST LOW 20 MIN: CPT

## 2020-07-08 ASSESSMENT — PAIN DESCRIPTION - LOCATION: LOCATION: FINGER (COMMENT WHICH ONE)

## 2020-07-08 ASSESSMENT — PAIN SCALES - GENERAL: PAINLEVEL_OUTOF10: 8

## 2020-07-08 ASSESSMENT — PAIN DESCRIPTION - PAIN TYPE: TYPE: ACUTE PAIN

## 2020-07-08 ASSESSMENT — PAIN DESCRIPTION - ORIENTATION: ORIENTATION: RIGHT

## 2020-07-08 NOTE — PROGRESS NOTES
[address] [place] [address] and Procedure Note      [facility] RECORD NUMBER:  [id number]  AGE: 44 y.o. GENDER: male  : 1970  EPISODE DATE:  2020    SUBJECTIVE:     Chief Complaint   Patient presents with    Wound Check     Right 2nd finger, Left leg         HISTORY OF PRESENT ILLNESS      Rehan Torres is a 44 y.o. male who presents today for wound/ulcer evaluation. Marie Jewell states that his left leg wound has improved, prompting him to stop dressing application. He states that his right index finger, however, continues to have purulent drainage \"pockets. \"  He reports that he \"squeezed the last pocket . \"  He denies any further drainage since then. He continues to apply betadine and dry gauze to area. He voices concern that he has had edema and \"bone pain\" to this finger for three months. He denies any previous imaging being completed of this digit. Marie Jewell has been to the emergency department on two occasions since last week's visit. Review of chart shows his first visit to be related to his left lower extremity wound and Jamie's concern that it may need to be debrided. He was evaluated by podiatry in the emergency department who determined that no debridement was required and was discharged home with instructions to continue augmentin as ordered. Marie Jewell reports that his second emergency department evaluation was to rule out sepsis related to elevated lactic acid and procalcitonin. He reports that sepsis was ruled out and he was discharged home with instructions to continue antibiotics as prescribed. Marie Jewell reports that his blood glucose was 500+ at his first ED evaluation but that it has been better since this time but has not checked value today. He continues to smoke cigars occasionally but denies daily smoking.       PAST MEDICAL HISTORY             Diagnosis Date    Bipolar 2 disorder [facility])     previously followed with Dr. Sherren Loser and Jamie Anglin in [place]    BPH (benign prostatic hyperplasia)     Diabetes mellitus type 2, uncontrolled ([facility]     HgbA1c on 4/2/2019 was 9.1.     Diabetic peripheral neuropathy (HCC)     Diabetic polyneuropathy ([address]     Diabetic ulcer of right foot associated with type 2 diabetes mellitus ([address] 12/10/2015    Essential hypertension     \"never been on b/p medication that I know of\"    GERD (gastroesophageal reflux disease)     Hammer toe of left foot     Heart murmur     denies any chest pain or palpitations    History of tobacco abuse     Hx of AKA (above knee amputation), right ([address] 04/18/2019    Dr. Earl Lis edema, diabetic, bilateral ([address] 05/04/2018    Dr. Aysha Dunne referred to retina specialist for 2nd opinion    Marijuana abuse in remission     Melena     MRSA (methicillin resistant staph aureus) culture positive     Onychomycosis     Other disorders of kidney and ureter in diseases classified elsewhere     Sleep apnea     no cpap    Thyroid disease     WD-Skin ulcer of fourth toe of right foot with necrosis of bone ([address] 6/29/2016       PAST SURGICAL HISTORY     Past Surgical History:   Procedure Laterality Date    ABSCESS DRAINAGE Right     foot    AMPUTATION ABOVE KNEE Right 4/18/2019    RIGHT ABOVE KNEE AMPUTATION performed by Aletha Pruitt MD at [facility] , LAPAROSCOPIC N/A 4/1/2020    ROBOTIC CHOLECYSTECTOMY performed by Kaye Lujan MD at [facility] [address] Right 07/01/2016    I & D    INCISION AND DRAINAGE Right 2/18/2019    I&D RIGHT STUMP performed by Aletha Pruitt MD at [address] [place] [address] Right 07/20/2016    LEG AMPUTATION BELOW KNEE Right 4/4/2019    I&D AND REVISION OF AMPUTATION RIGHT LEG performed by Aletha Pruitt MD at [address] [place] [address] Right 1/14/15    sole of foot I&D    [place] DRAIN INFECT SHOULDER BURSA Left 8/18/2017    LEFT SHOULDER INCISION AND DRAINAGE performed by Devang Obrien MD at [facility] OFFICE/OUTPT [address] Right 2018    EXCISIONAL DEBRIDEMENT RIGHT BKA STUMP performed by Norma Acevedo MD at [place] [address] Right 1/16/15    2nd toe with wound vac applied    UPPER GASTROINTESTINAL ENDOSCOPY N/A 3/3/2020    EGD DILATION SAVORY performed by Rachel Go MD at [facility]  ? when       FAMILY HISTORY     Family History   Problem Relation Age of Onset    Diabetes Mother     Other Mother         pneumonia, H1N1    Depression Mother     Early Death Mother     High Blood Pressure Mother     High Cholesterol Mother     Vision Loss Maternal Grandmother     Arthritis Maternal Grandfather     Heart Disease Maternal Grandfather        SOCIAL HISTORY     Social History     Tobacco Use    Smoking status: Former Smoker     Packs/day: 1.00     Years: 10.00     Pack years: 10.00     Types: Cigars     Start date: 1985     Last attempt to quit: 2000     Years since quittin.8    Smokeless tobacco: Never Used    Tobacco comment: Use to smoke now I don't   Substance Use Topics    Alcohol use: Not Currently     Alcohol/week: 0.0 standard drinks    Drug use: No       ALLERGIES     Allergies   Allergen Reactions    Pcn [Penicillins] Shortness Of Breath, Nausea And Vomiting and Other (See Comments)     Does not remember reactions. Has TOLERATED amoxicillin and several different cephalosporins.  Clindamycin/Lincomycin Itching    Vancomycin Hives      Rash, with erythematous plaques to abdomen, shoulders, and proximal upper extremities with pruritis, persisted with 2nd dose administered slower.       Actos [Pioglitazone] Swelling       MEDICATIONS     Current Outpatient Medications on File Prior to Encounter   Medication Sig Dispense Refill    sertraline (ZOLOFT) 50 MG tablet Take 1 tablet by mouth daily 30 tablet 5    levothyroxine (SYNTHROID) 50 MCG tablet Take 1 tablet by mouth daily 30 tablet 5    gabapentin (NEURONTIN) 300 MG capsule Take 1 capsule by mouth 3 times daily for 60 days. 90 capsule 1    insulin aspart (NOVOLOG FLEXPEN) 100 UNIT/ML injection pen Inject 25 Units into the skin 3 times daily (before meals) 22.5 mL 5    amoxicillin-clavulanate (AUGMENTIN) 875-125 MG per tablet Take 1 tablet by mouth 2 times daily for 14 days 28 tablet 0    metoprolol tartrate (LOPRESSOR) 25 MG tablet Take 1 tablet by mouth 2 times daily 60 tablet 0    lisinopril (PRINIVIL;ZESTRIL) 5 MG tablet Take 1 tablet by mouth daily 30 tablet 0    insulin detemir (LEVEMIR FLEXTOUCH) 100 UNIT/ML injection pen Inject 85 Units into the skin 2 times daily (Patient taking differently: Inject 55 Units into the skin 2 times daily ) 30 pen 5    ondansetron (ZOFRAN) 4 MG tablet Take 1 tablet by mouth every 8 hours as needed for Nausea or Vomiting 30 tablet 0    AMINO ACIDS COMPLEX PO Take 1 each by mouth daily Daily AA drink      blood glucose monitor kit and supplies Accu Chek Sheila meter. Use to test blood sugars DX:E11.65 1 kit 0    blood glucose monitor strips Accucheck Sheila Test Strips Test blood sugars 4 times daily DX:E11.65 400 strip 5    Lancets MISC Use to test blood sugars 4 times daily DX:E11.65 400 each 5    Insulin Syringe-Needle U-100 29G X 1/2\" 0.3 ML MISC 1 each by Does not apply route 4 times daily (after meals and at bedtime) 200 each 0     No current facility-administered medications on file prior to encounter. REVIEW OF SYSTEMS:     Constitutional: +chills, unsure if febrile, has not taken temperature. Afebrile today as well as at ED visits.   Denies night sweats, fatigue, weight loss/gain, loss of appetite   Head: Denies headache,  dizziness, loss of consciousness  Eye: Denies visual changes  Ears: Denies hearing loss, tinnitus  Mouth/throat: Denies ulceration, dental caries , dysphagia  Respiratory: Denies shortness of breath, cough, wheezing, dyspnea with exertion  Cardiovascular:Denies chest pain, palpitations, edema  Gastrointestinal: +nausea, diarrhea-ongoing 2+ weeks,  Denies vomiting, constipation, abdominal pain   ISMAEL: Denies dysuria, frequency, urgency, hematuria  Musculoskeletal: +right index finger pain, edema stiffness   Endocrine: Denies polyuria, polydipsia, cold or heat intolerance  Hematology: Denies easy brusing or bleeding, hx of clotting disorder  Dermatology: +wound right index finger, left lower extremity  Denies skin rash, eczema, pruritis  Psychiatry: Denies depression, anxiety, suicidal ideation    PHYSICAL EXAM:     BP (!) 145/66   Pulse 110   Temp 98.3 °F (36.8 °C) (Oral)   Resp 18   Ht 5' 6\" (1.676 m)   Wt 222 lb (100.7 kg)   SpO2 98%   BMI 35.83 kg/m²   Wt Readings from Last 3 Encounters:   07/08/20 222 lb (100.7 kg)   07/06/20 222 lb (100.7 kg)   07/03/20 220 lb (99.8 kg)       General:  Awake, alert, no apparent distress. Appears stated age  [de-identified]: conjuctivae are clear without exudate or hemorrhage, anicteric sclera, moist oral mucosa. Chest:  Respirations regular, non-labored. Chest rise and fall equal bilaterally. Lungs clear to auscultation throughout all fields  Cardiovascular:  RRR,S1S2, no murmur or gallop. Abdomen:  Soft, non tender to palpation. Neurological: Awake, alert, oriented x4. Sensation decreased to left lower extremity-chronic, unchanged from baseline per Jamie's report  Psychiatric:  Appropriate mood and affect  Extremities: Right lower extremity AKA. Left lower extremity non-traumatic in appearance. 2+ swelling left ankle. Posterior tibial pulse 2+. Right index finger range of motion slightly decreased. Capillary refill brisk.     Skin:  Warm and dry    Wound (s):    Location:   L lower leg anterior   Classification/stage: diabetic   Tunneling/undermining: Not Present   Wound bed: dry, no drainage and scabbed   Exudate:  none   Louise-wound:dry and scaly   Complications[de-identified] PM   Change in Wound Size % (l*w) 100 7/8/2020  1:12 PM   Wound Volume (cm^3) 0 cm^3 7/8/2020  1:12 PM   Wound Healing % 100 7/8/2020  1:12 PM   Wound Assessment Black; Pink 7/1/2020  9:18 AM   Drainage Amount Small 7/1/2020  9:18 AM   Drainage Description Yellow 7/1/2020  9:18 AM   Odor None 7/1/2020  9:18 AM   Margins Attached edges 7/1/2020  9:18 AM   Louise-wound Assessment White;Red;Swelling; Maceration 7/1/2020  9:18 AM   Red%Wound Bed 10 7/1/2020  9:18 AM   Black%Wound Bed 90 7/1/2020  9:18 AM   Number of days: 7         LABS     Lab Results   Component Value Date    BC No growth-preliminary  07/06/2020       ASSESSMENT   Diabetic ulcer of the right index finger  Complicating factors:  diabetes, poor glucose control and smoking   Wound has improved since last visit    Diabetic ulcer of the left lower extremity  Complicating factors:  Diabetes, poor glucose control and smoking  - Wound has improved significantly since last visit. PLAN     -Patient examined and evaluated  -Wound to left lower extremity has completely scabbed over, Ade Ruby states that he has been leaving open to air. Recommended to continue dressings as ordered to keep area clean and dry.    -Discussed the importance of good control of his diabetes on wound healing. Ade Ruby states that he has yet to make follow up visit with PCP post hospital encounter. I re-enforced need for follow up with PCP. - Wound to right index finger appears to be healing well. No drainage observed at today's visit. Ade Ruby reports intermittent \"pockets\" of drainage, last one occurring on Sunday. No fluctuance noted on exam.  No erythema or heat noted on exam.  He continues to complain of severe pain, 10/10, to finger with decreased range of motion, and edema. X-ray of right index finger ordered to evaluate for cause of this pain. Finger is slightly edematous as compared to left side. Range of motion slightly decreased.  Sensation is also decreased but Ade Ruby states that Friday 8:00 am - 4:30 pm  ([phone]).             *Increase in pain              *Temperature over 101              *Increase in drainage from your wound or a foul odor              *Uncontrolled swelling              *Need for compression bandage changes due to slippage, breakthrough drainage     If you need medical attention outside of business hours, please contact your Primary Care Doctor or go to the nearest emergency room.            Electronically signed by MARILYN Murcia CNP on 7/8/2020 at 1:36 PM

## 2020-07-08 NOTE — PLAN OF CARE
Problem: Wound:  Goal: Will show signs of wound healing; wound closure and no evidence of infection  Description: Will show signs of wound healing; wound closure and no evidence of infection  Outcome: Ongoing   Patient presents to wound clinic for follow up of right finger and left leg wound. Left leg is healed. No s/s of infection. Patient afebrile. Instructed patient to take Augmentin antibiotic as prescribed. Right finger xray ordered today, patient to have completed before follow up appointment. Instructed patient to keep blood sugars well controlled to help with wound healing and keep left leg elevated throughout the day. Right 2nd finger- Apply betadine moist gauze to wound bed. Cover with gauze. Wrap with gauze paradise. Change once daily. Follow up visit: 1 week on Thursday July 16th at 10:00 am.    Care plan reviewed with patient. Patient verbalize understanding of the plan of care and contribute to goal setting.

## 2020-07-08 NOTE — CARE COORDINATION
[facility] ED Follow Up Call    2020    Patient: Ramses Perrin Patient : 1970   MRN: [id number]  Reason for Admission: blood infection  Discharge Date: 20    Second attempt for ED risk call. Left message to return call.          Care Transitions ED Follow Up    Care Transitions Interventions        Patient DME:  Shower chair, Walker, Wheelchair, Other   Other Patient DME:  Entrance Ramp

## 2020-07-11 ASSESSMENT — ENCOUNTER SYMPTOMS
CHOKING: 0
TROUBLE SWALLOWING: 0
SHORTNESS OF BREATH: 0
COUGH: 0
SORE THROAT: 0
EYE ITCHING: 0
CONSTIPATION: 0
DIARRHEA: 1
ABDOMINAL PAIN: 0
NAUSEA: 0
VOMITING: 0

## 2020-07-12 LAB — BLOOD CULTURE, ROUTINE: NORMAL

## 2020-07-13 LAB — BLOOD CULTURE, ROUTINE: NORMAL

## 2020-07-15 ENCOUNTER — HOSPITAL ENCOUNTER (OUTPATIENT)
Age: 50
Discharge: HOME | End: 2020-07-15
Payer: MEDICARE

## 2020-07-15 LAB
BACTERIA: ABNORMAL /HPF
BILIRUBIN URINE: NEGATIVE
BLOOD, URINE: NEGATIVE
CASTS 2: ABNORMAL /LPF
CASTS UA: ABNORMAL /LPF
CHARACTER, URINE: CLEAR
COLOR: YELLOW
CRYSTALS, UA: ABNORMAL
EPITHELIAL CELLS, UA: ABNORMAL /HPF
GLUCOSE URINE: >= 1000 MG/DL
KETONES, URINE: NEGATIVE
LEUKOCYTE ESTERASE, URINE: NEGATIVE
MISCELLANEOUS 2: ABNORMAL
NITRITE, URINE: NEGATIVE
PH UA: 5 (ref 5–9)
PROTEIN UA: ABNORMAL
RBC URINE: ABNORMAL /HPF
RENAL EPITHELIAL, UA: ABNORMAL
SPECIFIC GRAVITY, URINE: > 1.03 (ref 1–1.03)
UROBILINOGEN, URINE: 1 EU/DL (ref 0–1)
WBC UA: ABNORMAL /HPF
YEAST: ABNORMAL

## 2020-07-15 PROCEDURE — U0003 INFECTIOUS AGENT DETECTION BY NUCLEIC ACID (DNA OR RNA); SEVERE ACUTE RESPIRATORY SYNDROME CORONAVIRUS 2 (SARS-COV-2) (CORONAVIRUS DISEASE [COVID-19]), AMPLIFIED PROBE TECHNIQUE, MAKING USE OF HIGH THROUGHPUT TECHNOLOGIES AS DESCRIBED BY CMS-2020-01-R: HCPCS

## 2020-07-15 PROCEDURE — 81001 URINALYSIS AUTO W/SCOPE: CPT

## 2020-07-16 ENCOUNTER — TELEPHONE (OUTPATIENT)
Dept: WOUND CARE | Age: 50
End: 2020-07-16

## 2020-07-16 ENCOUNTER — PREP FOR PROCEDURE (OUTPATIENT)
Dept: UROLOGY | Age: 50
End: 2020-07-16

## 2020-07-16 ENCOUNTER — TELEPHONE (OUTPATIENT)
Dept: UROLOGY | Age: 50
End: 2020-07-16

## 2020-07-16 LAB — SARS-COV-2: NOT DETECTED

## 2020-07-16 RX ORDER — SODIUM CHLORIDE 9 MG/ML
INJECTION, SOLUTION INTRAVENOUS CONTINUOUS
Status: CANCELLED | OUTPATIENT
Start: 2020-07-20

## 2020-07-19 ENCOUNTER — ANESTHESIA EVENT (OUTPATIENT)
Dept: OPERATING ROOM | Age: 50
End: 2020-07-19
Payer: MEDICARE

## 2020-07-20 ENCOUNTER — HOSPITAL ENCOUNTER (OUTPATIENT)
Age: 50
Setting detail: OUTPATIENT SURGERY
Discharge: HOME | End: 2020-07-20
Attending: UROLOGY | Admitting: UROLOGY
Payer: MEDICARE

## 2020-07-20 ENCOUNTER — TELEPHONE (OUTPATIENT)
Dept: UROLOGY | Age: 50
End: 2020-07-20

## 2020-07-20 ENCOUNTER — ANESTHESIA (OUTPATIENT)
Dept: OPERATING ROOM | Age: 50
End: 2020-07-20
Payer: MEDICARE

## 2020-07-20 VITALS
SYSTOLIC BLOOD PRESSURE: 138 MMHG | DIASTOLIC BLOOD PRESSURE: 62 MMHG | OXYGEN SATURATION: 100 % | RESPIRATION RATE: 19 BRPM

## 2020-07-20 VITALS
OXYGEN SATURATION: 98 % | DIASTOLIC BLOOD PRESSURE: 65 MMHG | RESPIRATION RATE: 18 BRPM | TEMPERATURE: 98.5 F | BODY MASS INDEX: 35.68 KG/M2 | HEIGHT: 66 IN | HEART RATE: 95 BPM | SYSTOLIC BLOOD PRESSURE: 116 MMHG | WEIGHT: 222 LBS

## 2020-07-20 LAB — GLUCOSE BLD-MCNC: 352 MG/DL (ref 70–108)

## 2020-07-20 PROCEDURE — 6360000002 HC RX W HCPCS: Performed by: UROLOGY

## 2020-07-20 PROCEDURE — 6360000002 HC RX W HCPCS: Performed by: NURSE ANESTHETIST, CERTIFIED REGISTERED

## 2020-07-20 PROCEDURE — 3600000003 HC SURGERY LEVEL 3 BASE: Performed by: UROLOGY

## 2020-07-20 PROCEDURE — 6370000000 HC RX 637 (ALT 250 FOR IP): Performed by: UROLOGY

## 2020-07-20 PROCEDURE — 87081 CULTURE SCREEN ONLY: CPT

## 2020-07-20 PROCEDURE — 3700000000 HC ANESTHESIA ATTENDED CARE: Performed by: UROLOGY

## 2020-07-20 PROCEDURE — 82948 REAGENT STRIP/BLOOD GLUCOSE: CPT

## 2020-07-20 PROCEDURE — 3700000001 HC ADD 15 MINUTES (ANESTHESIA): Performed by: UROLOGY

## 2020-07-20 PROCEDURE — 7100000010 HC PHASE II RECOVERY - FIRST 15 MIN: Performed by: UROLOGY

## 2020-07-20 PROCEDURE — 2580000003 HC RX 258: Performed by: UROLOGY

## 2020-07-20 PROCEDURE — 7100000011 HC PHASE II RECOVERY - ADDTL 15 MIN: Performed by: UROLOGY

## 2020-07-20 PROCEDURE — 3600000013 HC SURGERY LEVEL 3 ADDTL 15MIN: Performed by: UROLOGY

## 2020-07-20 RX ORDER — FENTANYL CITRATE 50 UG/ML
25 INJECTION, SOLUTION INTRAMUSCULAR; INTRAVENOUS EVERY 5 MIN PRN
Status: DISCONTINUED | OUTPATIENT
Start: 2020-07-20 | End: 2020-07-20 | Stop reason: HOSPADM

## 2020-07-20 RX ORDER — LABETALOL 20 MG/4 ML (5 MG/ML) INTRAVENOUS SYRINGE
5 EVERY 10 MIN PRN
Status: DISCONTINUED | OUTPATIENT
Start: 2020-07-20 | End: 2020-07-20 | Stop reason: HOSPADM

## 2020-07-20 RX ORDER — SODIUM CHLORIDE 9 MG/ML
INJECTION, SOLUTION INTRAVENOUS CONTINUOUS
Status: DISCONTINUED | OUTPATIENT
Start: 2020-07-20 | End: 2020-07-20 | Stop reason: HOSPADM

## 2020-07-20 RX ORDER — FENTANYL CITRATE 50 UG/ML
INJECTION, SOLUTION INTRAMUSCULAR; INTRAVENOUS PRN
Status: DISCONTINUED | OUTPATIENT
Start: 2020-07-20 | End: 2020-07-20 | Stop reason: SDUPTHER

## 2020-07-20 RX ORDER — MEPERIDINE HYDROCHLORIDE 25 MG/ML
12.5 INJECTION INTRAMUSCULAR; INTRAVENOUS; SUBCUTANEOUS EVERY 5 MIN PRN
Status: DISCONTINUED | OUTPATIENT
Start: 2020-07-20 | End: 2020-07-20 | Stop reason: HOSPADM

## 2020-07-20 RX ORDER — FENTANYL CITRATE 50 UG/ML
50 INJECTION, SOLUTION INTRAMUSCULAR; INTRAVENOUS EVERY 5 MIN PRN
Status: DISCONTINUED | OUTPATIENT
Start: 2020-07-20 | End: 2020-07-20 | Stop reason: HOSPADM

## 2020-07-20 RX ORDER — ONDANSETRON 2 MG/ML
4 INJECTION INTRAMUSCULAR; INTRAVENOUS
Status: DISCONTINUED | OUTPATIENT
Start: 2020-07-20 | End: 2020-07-20 | Stop reason: HOSPADM

## 2020-07-20 RX ORDER — LIDOCAINE HYDROCHLORIDE 20 MG/ML
JELLY TOPICAL PRN
Status: DISCONTINUED | OUTPATIENT
Start: 2020-07-20 | End: 2020-07-20 | Stop reason: ALTCHOICE

## 2020-07-20 RX ORDER — HYDRALAZINE HYDROCHLORIDE 20 MG/ML
5 INJECTION INTRAMUSCULAR; INTRAVENOUS EVERY 10 MIN PRN
Status: DISCONTINUED | OUTPATIENT
Start: 2020-07-20 | End: 2020-07-20 | Stop reason: HOSPADM

## 2020-07-20 RX ORDER — MIDAZOLAM HYDROCHLORIDE 1 MG/ML
INJECTION INTRAMUSCULAR; INTRAVENOUS PRN
Status: DISCONTINUED | OUTPATIENT
Start: 2020-07-20 | End: 2020-07-20 | Stop reason: SDUPTHER

## 2020-07-20 RX ORDER — TAMSULOSIN HYDROCHLORIDE 0.4 MG/1
0.4 CAPSULE ORAL DAILY
Qty: 30 CAPSULE | Refills: 6 | Status: SHIPPED | OUTPATIENT
Start: 2020-07-20 | End: 2020-10-29

## 2020-07-20 RX ORDER — PROPOFOL 10 MG/ML
INJECTION, EMULSION INTRAVENOUS PRN
Status: DISCONTINUED | OUTPATIENT
Start: 2020-07-20 | End: 2020-07-20 | Stop reason: SDUPTHER

## 2020-07-20 RX ADMIN — FENTANYL CITRATE 50 MCG: 50 INJECTION, SOLUTION INTRAMUSCULAR; INTRAVENOUS at 13:59

## 2020-07-20 RX ADMIN — MIDAZOLAM HYDROCHLORIDE 2 MG: 1 INJECTION, SOLUTION INTRAMUSCULAR; INTRAVENOUS at 13:57

## 2020-07-20 RX ADMIN — FENTANYL CITRATE 50 MCG: 50 INJECTION, SOLUTION INTRAMUSCULAR; INTRAVENOUS at 14:02

## 2020-07-20 RX ADMIN — PROPOFOL 50 MG: 10 INJECTION, EMULSION INTRAVENOUS at 14:09

## 2020-07-20 RX ADMIN — SODIUM CHLORIDE: 9 INJECTION, SOLUTION INTRAVENOUS at 11:31

## 2020-07-20 RX ADMIN — CEFAZOLIN 2 G: 10 INJECTION, POWDER, FOR SOLUTION INTRAVENOUS at 14:00

## 2020-07-20 RX ADMIN — PROPOFOL 50 MG: 10 INJECTION, EMULSION INTRAVENOUS at 14:05

## 2020-07-20 RX ADMIN — PROPOFOL 30 MG: 10 INJECTION, EMULSION INTRAVENOUS at 14:02

## 2020-07-20 ASSESSMENT — PULMONARY FUNCTION TESTS
PIF_VALUE: 0
PIF_VALUE: 1
PIF_VALUE: 0

## 2020-07-20 ASSESSMENT — PAIN - FUNCTIONAL ASSESSMENT: PAIN_FUNCTIONAL_ASSESSMENT: 0-10

## 2020-07-20 NOTE — PROGRESS NOTES
Jamie admitted to [facility], wheelchair ridden, AKA right.  @ 2156.  No family at bedside

## 2020-07-20 NOTE — FLOWSHEET NOTE
Pt returned to [facility] room 17. Vitals and assessment as charted. 0.9 infusing, @600ml to count from PACU. Pt has ice cream and water. Pt verbalized understanding of discharge criteria and call light use. Call light in reach.

## 2020-07-20 NOTE — ANESTHESIA PRE PROCEDURE
Department of Anesthesiology  Preprocedure Note       Name:  Fred Swift   Age:  44 y.o.  :  1970                                          MRN:  [id number]         Date:  2020      Surgeon: Giovany Lorenz):  Lissett Alatorre MD    Procedure: Procedure(s):  CYSTOSCOPY    Medications prior to admission:   Prior to Admission medications    Medication Sig Start Date End Date Taking? Authorizing Provider   sertraline (ZOLOFT) 50 MG tablet Take 1 tablet by mouth daily 20  Yes MARILYN Singleton CNP   levothyroxine (SYNTHROID) 50 MCG tablet Take 1 tablet by mouth daily 20  Yes MARILYN Singleton CNP   insulin aspart (NOVOLOG FLEXPEN) 100 UNIT/ML injection pen Inject 25 Units into the skin 3 times daily (before meals) 20 Yes MARILYN Singleton CNP   metoprolol tartrate (LOPRESSOR) 25 MG tablet Take 1 tablet by mouth 2 times daily 20  Yes Johanna Knight PA-C   insulin detemir (LEVEMIR FLEXTOUCH) 100 UNIT/ML injection pen Inject 85 Units into the skin 2 times daily  Patient taking differently: Inject 55 Units into the skin 2 times daily  20  Yes MARILYN Garcia CNP   AMINO ACIDS COMPLEX PO Take 1 each by mouth daily Daily AA drink   Yes Historical Provider, MD   gabapentin (NEURONTIN) 300 MG capsule Take 1 capsule by mouth 3 times daily for 60 days. 20  MARILYN Singleton CNP   blood glucose monitor kit and supplies Accu Chek Sheila meter.  Use to test blood sugars DX:E11.65 20   MARILYN Singleton CNP   blood glucose monitor strips Accucheck Sheila Test Strips Test blood sugars 4 times daily DX:E11.65 20   MARILYN Singleton CNP   Lancets MISC Use to test blood sugars 4 times daily DX:E11.65 20   MARILYN Singleton CNP   lisinopril (PRINIVIL;ZESTRIL) 5 MG tablet Take 1 tablet by mouth daily 20   MARILYN Pierre CNP   Insulin Syringe-Needle U-100 29G X 1/2\" 0.3 ML MISC 1 each by Does not apply route 4 times daily (after meals and at bedtime) 4/30/19   Jacqueline Cox MD       Current medications:    Current Facility-Administered Medications   Medication Dose Route Frequency Provider Last Rate Last Dose    0.9 % sodium chloride infusion   Intravenous Continuous Gypsy Argueta MD        ceFAZolin (ANCEF) 2 g in dextrose 5 % 50 mL IVPB  2 g Intravenous 30 Min Pre-Op Gypsy Argueta MD           Allergies: Allergies   Allergen Reactions    Pcn [Penicillins] Shortness Of Breath, Nausea And Vomiting and Other (See Comments)     Does not remember reactions. Has TOLERATED amoxicillin and several different cephalosporins.  Actos [Pioglitazone] Swelling    Clindamycin/Lincomycin Itching    Vancomycin Hives      Rash, with erythematous plaques to abdomen, shoulders, and proximal upper extremities with pruritis, persisted with 2nd dose administered slower. Problem List:    Patient Active Problem List   Diagnosis Code    Cellulitis L03.90    Status post below knee amputation of right lower extremity ([facility] [address] Z89.511    Gait disturbance R26.9    Sebaceous cyst L72.3    Uncontrolled type 2 diabetes mellitus with hyperglycemia, with long-term current use of insulin ([facility]) E11.65, Z79.4    Severe bipolar II disorder, recent episode major depressive, remission ([facility]) F31.81    Bipolar 1 disorder ([facility]) F31.9    Leukocytosis D72.829    Lactic acidosis E87.2    Hyponatremia E87.1    Normocytic anemia D64.9    Obesity (BMI 30-39. 9) E66.9    History of noncompliance with medical treatment Z91.19    Essential hypertension I10    Tobacco dependence F17.200    Gastroesophageal reflux disease without esophagitis K21.9    History of marijuana use Z87.898    Physical debility R53.81    Anemia D64.9    Electrolyte imbalance E87.8    Type 2 diabetes mellitus with hyperglycemia, with long-term current use of insulin (HCC) E11.65, Z79.4    Weakness R53.1    Infection of above knee amputation stump of right leg (HCC) T87.43    Above knee amputation of right lower extremity (HCC) S78.111A    Sepsis (HCC) A41.9    Vitamin D deficiency E55.9    COPD exacerbation (HCC) J44.1    Influenza B J10.1    Depression, recurrent (HCC) F33.9    Major depressive disorder, recurrent (HCC) F33.9    GI bleed K92.2    Elevated lipase R74.8    Pressure ulcer of left calf, unstageable ([facility]) L89.890    Other psychoactive substance abuse, uncomplicated (HCC) F97.89    Calculus of gallbladder without cholecystitis without obstruction K80.20    Right upper quadrant abdominal pain R10.11    Cellulitis of hand L03.119    Pedal edema R60.0    MURALI (obstructive sleep apnea) G47.33    Shortness of breath R06.02    Hypothyroid E03.9    Anxiety and depression F41.9, F32.9    Dyspnea R06.00    Sinus tachycardia I34.9    Metabolic acidosis Z36.5    Diabetic ulcer of lower extremity ([facility]) E11.622, L97.909    Ulcer, skin, non-healing ([facility]) L98.499    Streptococcus infection, group A B95.0    Localized edema R60.0    Pain R52       Past Medical History:        Diagnosis Date    Bipolar 2 disorder ([facility])     previously followed with Dr. Hemanth Lim and Fabian Jeffrey in [facility] BPH (benign prostatic hyperplasia)     Diabetes mellitus type 2, uncontrolled ([facility] [address]     HgbA1c on 4/2/2019 was 9.1.     Diabetic peripheral neuropathy ([facility])     Diabetic polyneuropathy ([facility] [address]     Diabetic ulcer of right foot associated with type 2 diabetes mellitus ([facility] [address] 12/10/2015    Essential hypertension     \"never been on b/p medication that I know of\"    GERD (gastroesophageal reflux disease)     Hammer toe of left foot     Heart murmur     denies any chest pain or palpitations    History of tobacco abuse     Hx of AKA (above knee amputation), right ([facility] [address] 04/18/2019    Dr. Noel Llanos Macular edema, diabetic, bilateral ([facility] [address] 05/04/2018    Dr. Jenny Artis referred to retina specialist for 2nd opinion Topics    Alcohol use: Not Currently     Alcohol/week: 0.0 standard drinks                                Counseling given: Not Answered  Comment: Use to smoke now I don't      Vital Signs (Current):   Vitals:    07/20/20 1118   BP: 138/68   Pulse: 74   Resp: 16   Temp: 97.4 °F (36.3 °C)   TempSrc: Temporal   SpO2: 98%   Weight: 222 lb (100.7 kg)   Height: 5' 6\" (1.676 m)                                              BP Readings from Last 3 Encounters:   07/20/20 138/68   07/08/20 (!) 145/66   07/06/20 (!) 145/79       NPO Status: Time of last liquid consumption: 2100                        Time of last solid consumption: 2100                        Date of last liquid consumption: 07/19/20                        Date of last solid food consumption: 07/19/20    BMI:   Wt Readings from Last 3 Encounters:   07/20/20 222 lb (100.7 kg)   07/08/20 222 lb (100.7 kg)   07/06/20 222 lb (100.7 kg)     Body mass index is 35.83 kg/m². CBC:   Lab Results   Component Value Date    WBC 11.1 07/06/2020    RBC 4.88 07/06/2020    HGB 14.4 07/06/2020    HCT 43.2 07/06/2020    MCV 88.5 07/06/2020    RDW 14.8 03/17/2020     07/06/2020       CMP:   Lab Results   Component Value Date     07/06/2020    K 3.9 07/06/2020    K 3.6 06/18/2020    CL 96 07/06/2020    CO2 26 07/06/2020    BUN 7 07/06/2020    CREATININE 0.6 07/06/2020    GFRAA >60 08/24/2016    LABGLOM >90 07/06/2020    GLUCOSE 137 07/06/2020    GLUCOSE 318 03/17/2020    PROT 7.8 07/06/2020    CALCIUM 8.8 07/06/2020    BILITOT 0.4 07/06/2020    ALKPHOS 70 07/06/2020    AST 31 07/06/2020    ALT 28 07/06/2020       POC Tests: No results for input(s): POCGLU, POCNA, POCK, POCCL, POCBUN, POCHEMO, POCHCT in the last 72 hours.     Coags:   Lab Results   Component Value Date    PROTIME 12.2 01/03/2019    INR 0.97 11/06/2019    APTT 36.8 03/17/2020       HCG (If Applicable): No results found for: PREGTESTUR, PREGSERUM, HCG, HCGQUANT     ABGs: No results found for: PHART, PO2ART, CRV2BPJ, LYP7RBC, BEART, U6LZOBPJ     Type & Screen (If Applicable):  Lab Results   Component Value Date    LABRH POS 03/02/2020       Drug/Infectious Status (If Applicable):  Lab Results   Component Value Date    HEPCAB Negative 10/10/2018       COVID-19 Screening (If Applicable):   Lab Results   Component Value Date    COVID19 Not Detected 07/15/2020         Anesthesia Evaluation  Patient summary reviewed and Nursing notes reviewed  Airway: Mallampati: III  TM distance: >3 FB     Mouth opening: > = 3 FB Dental:    (+) edentulous      Pulmonary:normal exam                               Cardiovascular:                      Neuro/Psych:               GI/Hepatic/Renal:             Endo/Other:                     Abdominal:           Vascular:                                        Anesthesia Plan      general     ASA 3       Induction: intravenous. Anesthetic plan and risks discussed with patient. Use of blood products discussed with patient whom. Plan discussed with CRNA.                   Nima Arana DO   7/20/2020

## 2020-07-20 NOTE — H&P
[facility]  Urology H&P Note     Patient:  Abdulkadir Bolton  MRN: [id number]  YOB: 1970    ATTENDING: Leslie Almonte     CHIEF COMPLAINT:  BPH with urinar retention    HISTORY OF PRESENT ILLNESS:   The patient is a 44 y.o. male who presents with history of urinary retention and bph. He refuses any type of catheter. Patient's old records, notes and chart reviewed and summarized above. Past Medical History:    Past Medical History:   Diagnosis Date    Bipolar 2 disorder [facility])     previously followed with Dr. Brittany Callahan and Niya Lara in [facility] BPH (benign prostatic hyperplasia)     Diabetes mellitus type 2, uncontrolled ([address]     HgbA1c on 4/2/2019 was 9.1.     Diabetic peripheral neuropathy (HCC)     Diabetic polyneuropathy ([address]     Diabetic ulcer of right foot associated with type 2 diabetes mellitus ([address] 12/10/2015    Essential hypertension     \"never been on b/p medication that I know of\"    GERD (gastroesophageal reflux disease)     Hammer toe of left foot     Heart murmur     denies any chest pain or palpitations    History of tobacco abuse     Hx of AKA (above knee amputation), right ([address] 04/18/2019    Dr. Al Sosa edema, diabetic, bilateral ([address] 05/04/2018    Dr. Yusuf Gore referred to retina specialist for 2nd opinion    Marijuana abuse in remission     Melena     MRSA (methicillin resistant staph aureus) culture positive     Onychomycosis     Other disorders of kidney and ureter in diseases classified elsewhere     Sleep apnea     no cpap    Thyroid disease     WD-Skin ulcer of fourth toe of right foot with necrosis of bone ([address] 6/29/2016       Past Surgical History:    Past Surgical History:   Procedure Laterality Date    ABSCESS DRAINAGE Right     foot    AMPUTATION ABOVE KNEE Right 4/18/2019    RIGHT ABOVE KNEE AMPUTATION performed by Jahaira Valle MD at [address] [place] [address], [address] N/A 4/1/2020    ROBOTIC CHOLECYSTECTOMY performed by Willa Briones MD at [address], COLON, DIAGNOSTIC      FOOT DEBRIDEMENT Right 07/01/2016    I & D    INCISION AND DRAINAGE Right 2/18/2019    I&D RIGHT STUMP performed by Judith Martinez MD at [address] [facility] Right 07/20/2016    LEG AMPUTATION BELOW KNEE Right 4/4/2019    I&D AND REVISION OF AMPUTATION RIGHT LEG performed by Judith Martinez MD at [place] [address] Right 1/14/15    sole of foot I&D    MD DRAIN INFECT SHOULDER BURSA Left 8/18/2017    LEFT SHOULDER INCISION AND DRAINAGE performed by Judith Martinez MD at [address] [place] [address] OFFICE/OUTPT [facility] Right 9/20/2018    EXCISIONAL DEBRIDEMENT RIGHT BKA STUMP performed by Delta Marroquin MD at [address] Right 1/16/15    2nd toe with wound vac applied    UPPER GASTROINTESTINAL ENDOSCOPY N/A 3/3/2020    EGD DILATION SAVORY performed by Claudine Selby MD at [address] [facility]  ? when     Previous Urologic Surgery: none  Medications Prior to Admission:    Prior to Admission medications    Medication Sig Start Date End Date Taking?  Authorizing Provider   sertraline (ZOLOFT) 50 MG tablet Take 1 tablet by mouth daily 7/6/20  Yes Nathanael Bernheim, APRN - CNP   levothyroxine (SYNTHROID) 50 MCG tablet Take 1 tablet by mouth daily 7/6/20  Yes Nathanael Bernheim, APRN - CNP   insulin aspart (NOVOLOG FLEXPEN) 100 UNIT/ML injection pen Inject 25 Units into the skin 3 times daily (before meals) 7/6/20 1/2/21 Yes Nathanael Bernheim, APRN - CNP   metoprolol tartrate (LOPRESSOR) 25 MG tablet Take 1 tablet by mouth 2 times daily 6/25/20  Yes Clementine Pretty PA-C   insulin detemir (LEVEMIR FLEXTOUCH) 100 UNIT/ML injection pen Inject 85 Units into the skin 2 times daily  Patient taking differently: Inject 55 Units into the skin 2 times daily  4/23/20  Yes MARILYN Hughes CNP   AMINO ACIDS COMPLEX PO Take 1 each by mouth daily Daily AA drink   Yes Historical Provider, MD   gabapentin (NEURONTIN) 300 MG capsule Take 1 capsule by mouth 3 times daily for 60 days. 20  MARILYN Collazo CNP   blood glucose monitor kit and supplies Accu Chek Sheila meter. Use to test blood sugars DX:E11.65 20   Onalee MARILYN Newton CNP   blood glucose monitor strips Accucheck Sheila Test Strips Test blood sugars 4 times daily DX:   Onalee MARILYN Newton CNP   Lancets MISC Use to test blood sugars 4 times daily DX:   Onalee MARILYN Newton CNP   lisinopril (PRINIVIL;ZESTRIL) 5 MG tablet Take 1 tablet by mouth daily 20   MARILYN Farley CNP   Insulin Syringe-Needle U-100 29G X 1/2\" 0.3 ML MISC 1 each by Does not apply route 4 times daily (after meals and at bedtime) 19   Natalie Alvarez MD       Allergies:  Pcn [penicillins]; Actos [pioglitazone];  Clindamycin/lincomycin; and Vancomycin    Social History:    Social History     Socioeconomic History    Marital status:      Spouse name: Not on file    Number of children: 2    Years of education: 15    Highest education level: Not on file   Occupational History    Not on file   Social Needs    Financial resource strain: Not on file    Food insecurity     Worry: Not on file     Inability: Not on file   [employer] needs     Medical: Not on file     Non-medical: Not on file   Tobacco Use    Smoking status: Former Smoker     Packs/day: 1.00     Years: 10.00     Pack years: 10.00     Types: Cigars     Start date: 1985     Last attempt to quit: 2000     Years since quittin.9    Smokeless tobacco: Never Used    Tobacco comment: Use to smoke now I don't   Substance and Sexual Activity    Alcohol use: Not Currently     Alcohol/week: 0.0 standard drinks    Drug use: No    Sexual activity: Yes     Partners: Female   Lifestyle    Physical activity     Days per week: Not on file     Minutes per session: Not on file  Stress: Not on file   Relationships    Social connections     Talks on phone: Not on file     Gets together: Not on file     Attends Evangelical service: Not on file     Active member of club or organization: Not on file     Attends meetings of clubs or organizations: Not on file     Relationship status: Not on file    Intimate partner violence     Fear of current or ex partner: Not on file     Emotionally abused: Not on file     Physically abused: Not on file     Forced sexual activity: Not on file   Other Topics Concern    Not on file   Social History Narrative    Not on file       Family History:    Family History   Problem Relation Age of Onset    Diabetes Mother     Other Mother         pneumonia, H1N1    Depression Mother     Early Death Mother     High Blood Pressure Mother     High Cholesterol Mother     Vision Loss Maternal Grandmother     Arthritis Maternal Grandfather     Heart Disease Maternal Grandfather      Previous Urologic Family history: none  REVIEW OF SYSTEMS:  All systems reviewed and negative except for that already noted in the HPI. Physical Exam:      Patient Vitals for the past 24 hrs:   BP Temp Temp src Pulse Resp SpO2 Height Weight   07/20/20 1118 138/68 97.4 °F (36.3 °C) Temporal 74 16 98 % 5' 6\" (1.676 m) 222 lb (100.7 kg)     Constitutional: Patient in no acute distress; Neuro: alert and oriented to person place and time. Psych: Mood and affect normal.  Skin: Normal  Lungs: Respiratory effort normal  Cardiovascular:  Normal peripheral pulses  Abdomen: Soft, non-tender, non-distended with no CVA, flank pain, hepatosplenomegaly or hernia. Kidneys normal.  Bladder non-tender and not distended. Lymphatics: no palpable lymphadenopathy        LABS:   No results for input(s): WBC, HGB, HCT, MCV, PLT in the last 72 hours. No results for input(s): NA, K, CL, CO2, PHOS, BUN, CREATININE in the last 72 hours.     Invalid input(s): CA  Lab Results   Component Value Date PSA 0.78 03/24/2020       Additional Lab/culture results:    Urinalysis: No results for input(s): COLORU, PHUR, LABCAST, WBCUA, RBCUA, MUCUS, TRICHOMONAS, YEAST, BACTERIA, CLARITYU, SPECGRAV, LEUKOCYTESUR, UROBILINOGEN, Aaliyah Minium in the last 72 hours. Invalid input(s): NITRATE, GLUCOSEUKETONESUAMORPHOUS     -----------------------------------------------------------------  Imaging Results:    Assessment and Plan   Impression:    Patient Active Problem List   Diagnosis    Cellulitis    Status post below knee amputation of right lower extremity [facility]    Gait disturbance    Sebaceous cyst    Uncontrolled type 2 diabetes mellitus with hyperglycemia, with long-term current use of insulin ([facility])    Severe bipolar II disorder, recent episode major depressive, remission ([facility])    Bipolar 1 disorder ([facility])    Leukocytosis    Lactic acidosis    Hyponatremia    Normocytic anemia    Obesity (BMI 30-39. 9)    History of noncompliance with medical treatment    Essential hypertension    Tobacco dependence    Gastroesophageal reflux disease without esophagitis    History of marijuana use    Physical debility    Anemia    Electrolyte imbalance    Type 2 diabetes mellitus with hyperglycemia, with long-term current use of insulin ([facility])    Weakness    Infection of above knee amputation stump of right leg ([facility])    Above knee amputation of right lower extremity ([facility])    Sepsis ([facility])    Vitamin D deficiency    COPD exacerbation ([facility])    Influenza B    Depression, recurrent ([facility])    Major depressive disorder, recurrent ([facility])    GI bleed    Elevated lipase    Pressure ulcer of left calf, unstageable ([facility])    Other psychoactive substance abuse, uncomplicated ([facility])    Calculus of gallbladder without cholecystitis without obstruction    Right upper quadrant abdominal pain    Cellulitis of hand    Pedal edema    MURALI (obstructive sleep apnea)    Shortness of breath    Hypothyroid    Anxiety and

## 2020-07-20 NOTE — ANESTHESIA POSTPROCEDURE EVALUATION
Department of Anesthesiology  Postprocedure Note    Patient: Blanquita Carey  MRN: [id number]  YOB: 1970  Date of evaluation: 7/20/2020  Time:  2:21 PM     Procedure Summary     Date:  07/20/20 Room / Location:  [address] [place] [address]    Anesthesia Start:  0837 Anesthesia Stop:  0503    Procedure:  CYSTOSCOPY (N/A ) Diagnosis:  (BPH WITH OBSTRUCTION, URINARY RETENTION)    Surgeon:  Beatriz Henriquez MD Responsible Provider:  Maryjane Villalba DO    Anesthesia Type:  general ASA Status:  3          Anesthesia Type: general    Everette Phase I: Everette Score: 10    Everette Phase II:      Last vitals: Reviewed and per EMR flowsheets.        Anesthesia Post Evaluation    Patient location during evaluation: bedside  Patient participation: complete - patient participated  Level of consciousness: awake  Pain score: 0  Airway patency: patent  Nausea & Vomiting: no nausea and no vomiting  Complications: no  Cardiovascular status: hemodynamically stable  Respiratory status: acceptable  Hydration status: euvolemic

## 2020-07-21 LAB — MRSA SCREEN: NORMAL

## 2020-07-22 LAB — MRSA SCREEN: NORMAL

## 2020-07-22 NOTE — PROGRESS NOTES
CLINICAL PHARMACY NOTE: MEDS TO [address] Select Patient?: Yes  Total # of Prescriptions Filled: 1   The following medications were delivered to the patient:  Tamsulosin 0.4mg  Total # of Interventions Completed: 2  Time Spent (min): 30    Additional Documentation:

## 2020-07-27 ENCOUNTER — HOSPITAL ENCOUNTER (OUTPATIENT)
Dept: PULMONOLOGY | Age: 50
Discharge: HOME | End: 2020-07-27
Payer: MEDICARE

## 2020-07-27 PROCEDURE — 94060 EVALUATION OF WHEEZING: CPT

## 2020-07-27 PROCEDURE — 94729 DIFFUSING CAPACITY: CPT

## 2020-07-27 PROCEDURE — 94727 GAS DIL/WSHOT DETER LNG VOL: CPT

## 2020-07-29 RX ORDER — GABAPENTIN 300 MG/1
300 CAPSULE ORAL 3 TIMES DAILY
Qty: 90 CAPSULE | Refills: 1 | Status: SHIPPED | OUTPATIENT
Start: 2020-07-29 | End: 2020-09-11 | Stop reason: SDUPTHER

## 2020-07-29 NOTE — PROGRESS NOTES
[facility]  Recreational Therapy  Daily Note  [address]    Time Spent with Patient: 10 minutes    Date:  4/24/2019       Patient Name: Jericho Hadley      MRN: [id number]      YOB: 1970 (44 y.o.)       Gender: male  Diagnosis: Above knee amputation of right lower extremity   Referring Practitioner: Dr. Joselin Bryant     Patient enjoyed spending time with our pet therapy dogs. Pt sitting up in his w/c. He immediately brightened when RTs brought dogs in his room. Pt appreciative of visit.      Electronically signed by: GABRIEL Jeff Student  Date: 4/24/2019 Additional Area 3 Location: chin

## 2020-07-31 ENCOUNTER — HOSPITAL ENCOUNTER (EMERGENCY)
Age: 50
Discharge: HOME | End: 2020-07-31
Payer: MEDICARE

## 2020-07-31 VITALS
BODY MASS INDEX: 35.68 KG/M2 | RESPIRATION RATE: 20 BRPM | DIASTOLIC BLOOD PRESSURE: 57 MMHG | WEIGHT: 222 LBS | OXYGEN SATURATION: 98 % | HEIGHT: 66 IN | HEART RATE: 105 BPM | SYSTOLIC BLOOD PRESSURE: 147 MMHG | TEMPERATURE: 97.6 F

## 2020-07-31 LAB
ALBUMIN SERPL-MCNC: 3.7 G/DL (ref 3.5–5.1)
ALP BLD-CCNC: 64 U/L (ref 38–126)
ALT SERPL-CCNC: 24 U/L (ref 11–66)
ANION GAP SERPL CALCULATED.3IONS-SCNC: 16 MEQ/L (ref 8–16)
AST SERPL-CCNC: 36 U/L (ref 5–40)
BASOPHILS # BLD: 0.8 %
BASOPHILS ABSOLUTE: 0.1 THOU/MM3 (ref 0–0.1)
BETA-HYDROXYBUTYRATE: 1.99 MG/DL (ref 0.2–2.81)
BILIRUB SERPL-MCNC: 0.3 MG/DL (ref 0.3–1.2)
BILIRUBIN URINE: NEGATIVE
BLOOD, URINE: NEGATIVE
BUN BLDV-MCNC: 19 MG/DL (ref 7–22)
CALCIUM SERPL-MCNC: 10.1 MG/DL (ref 8.5–10.5)
CHARACTER, URINE: CLEAR
CHLORIDE BLD-SCNC: 94 MEQ/L (ref 98–111)
CO2: 23 MEQ/L (ref 23–33)
COLOR: YELLOW
CREAT SERPL-MCNC: 0.9 MG/DL (ref 0.4–1.2)
EKG ATRIAL RATE: 111 BPM
EKG P AXIS: 40 DEGREES
EKG P-R INTERVAL: 162 MS
EKG Q-T INTERVAL: 360 MS
EKG QRS DURATION: 84 MS
EKG QTC CALCULATION (BAZETT): 489 MS
EKG R AXIS: 13 DEGREES
EKG T AXIS: 52 DEGREES
EKG VENTRICULAR RATE: 111 BPM
EOSINOPHIL # BLD: 3.7 %
EOSINOPHILS ABSOLUTE: 0.4 THOU/MM3 (ref 0–0.4)
ERYTHROCYTE [DISTWIDTH] IN BLOOD BY AUTOMATED COUNT: 15 % (ref 11.5–14.5)
ERYTHROCYTE [DISTWIDTH] IN BLOOD BY AUTOMATED COUNT: 49 FL (ref 35–45)
GFR SERPL CREATININE-BSD FRML MDRD: 88 ML/MIN/1.73M2
GLUCOSE BLD-MCNC: 301 MG/DL (ref 70–108)
GLUCOSE BLD-MCNC: 428 MG/DL (ref 70–108)
GLUCOSE BLD-MCNC: 492 MG/DL (ref 70–108)
GLUCOSE URINE: >= 1000 MG/DL
HCT VFR BLD CALC: 45.7 % (ref 42–52)
HEMOGLOBIN: 15.2 GM/DL (ref 14–18)
IMMATURE GRANS (ABS): 0.14 THOU/MM3 (ref 0–0.07)
IMMATURE GRANULOCYTES: 1.3 %
KETONES, URINE: NEGATIVE
LACTIC ACID: 2.3 MMOL/L (ref 0.5–2.2)
LACTIC ACID: 4 MMOL/L (ref 0.5–2.2)
LEUKOCYTE ESTERASE, URINE: NEGATIVE
LIPASE: 32.9 U/L (ref 5.6–51.3)
LYMPHOCYTES # BLD: 27.9 %
LYMPHOCYTES ABSOLUTE: 3 THOU/MM3 (ref 1–4.8)
MCH RBC QN AUTO: 30.2 PG (ref 26–33)
MCHC RBC AUTO-ENTMCNC: 33.3 GM/DL (ref 32.2–35.5)
MCV RBC AUTO: 90.7 FL (ref 80–94)
MONOCYTES # BLD: 6.2 %
MONOCYTES ABSOLUTE: 0.7 THOU/MM3 (ref 0.4–1.3)
NITRITE, URINE: NEGATIVE
NUCLEATED RED BLOOD CELLS: 0 /100 WBC
OSMOLALITY CALCULATION: 286.9 MOSMOL/KG (ref 275–300)
PH UA: 5 (ref 5–9)
PLATELET # BLD: 232 THOU/MM3 (ref 130–400)
PMV BLD AUTO: 9.4 FL (ref 9.4–12.4)
POTASSIUM REFLEX MAGNESIUM: 4.1 MEQ/L (ref 3.5–5.2)
PROTEIN UA: NEGATIVE
RBC # BLD: 5.04 MILL/MM3 (ref 4.7–6.1)
SEG NEUTROPHILS: 60.1 %
SEGMENTED NEUTROPHILS ABSOLUTE COUNT: 6.4 THOU/MM3 (ref 1.8–7.7)
SODIUM BLD-SCNC: 133 MEQ/L (ref 135–145)
SPECIFIC GRAVITY, URINE: > 1.03 (ref 1–1.03)
TOTAL PROTEIN: 8.1 G/DL (ref 6.1–8)
UROBILINOGEN, URINE: 0.2 EU/DL (ref 0–1)
WBC # BLD: 10.7 THOU/MM3 (ref 4.8–10.8)

## 2020-07-31 PROCEDURE — 82010 KETONE BODYS QUAN: CPT

## 2020-07-31 PROCEDURE — 82948 REAGENT STRIP/BLOOD GLUCOSE: CPT

## 2020-07-31 PROCEDURE — 80053 COMPREHEN METABOLIC PANEL: CPT

## 2020-07-31 PROCEDURE — 6360000002 HC RX W HCPCS: Performed by: PHYSICIAN ASSISTANT

## 2020-07-31 PROCEDURE — 2580000003 HC RX 258: Performed by: PHYSICIAN ASSISTANT

## 2020-07-31 PROCEDURE — 96375 TX/PRO/DX INJ NEW DRUG ADDON: CPT

## 2020-07-31 PROCEDURE — 96361 HYDRATE IV INFUSION ADD-ON: CPT

## 2020-07-31 PROCEDURE — 85025 COMPLETE CBC W/AUTO DIFF WBC: CPT

## 2020-07-31 PROCEDURE — 81003 URINALYSIS AUTO W/O SCOPE: CPT

## 2020-07-31 PROCEDURE — 96374 THER/PROPH/DIAG INJ IV PUSH: CPT

## 2020-07-31 PROCEDURE — 83690 ASSAY OF LIPASE: CPT

## 2020-07-31 PROCEDURE — 99284 EMERGENCY DEPT VISIT MOD MDM: CPT

## 2020-07-31 PROCEDURE — 36415 COLL VENOUS BLD VENIPUNCTURE: CPT

## 2020-07-31 PROCEDURE — 93005 ELECTROCARDIOGRAM TRACING: CPT | Performed by: PHYSICIAN ASSISTANT

## 2020-07-31 PROCEDURE — 83605 ASSAY OF LACTIC ACID: CPT

## 2020-07-31 PROCEDURE — 6370000000 HC RX 637 (ALT 250 FOR IP): Performed by: PHYSICIAN ASSISTANT

## 2020-07-31 RX ORDER — 0.9 % SODIUM CHLORIDE 0.9 %
1000 INTRAVENOUS SOLUTION INTRAVENOUS ONCE
Status: COMPLETED | OUTPATIENT
Start: 2020-07-31 | End: 2020-07-31

## 2020-07-31 RX ORDER — ONDANSETRON 2 MG/ML
4 INJECTION INTRAMUSCULAR; INTRAVENOUS ONCE
Status: COMPLETED | OUTPATIENT
Start: 2020-07-31 | End: 2020-07-31

## 2020-07-31 RX ORDER — ONDANSETRON 4 MG/1
4 TABLET, ORALLY DISINTEGRATING ORAL EVERY 8 HOURS PRN
Qty: 20 TABLET | Refills: 0 | Status: SHIPPED | OUTPATIENT
Start: 2020-07-31 | End: 2020-09-02 | Stop reason: SDUPTHER

## 2020-07-31 RX ADMIN — SODIUM CHLORIDE 1000 ML: 9 INJECTION, SOLUTION INTRAVENOUS at 03:37

## 2020-07-31 RX ADMIN — Medication 10 UNITS: at 03:02

## 2020-07-31 RX ADMIN — ONDANSETRON 4 MG: 2 INJECTION INTRAMUSCULAR; INTRAVENOUS at 02:05

## 2020-07-31 RX ADMIN — SODIUM CHLORIDE 1000 ML: 9 INJECTION, SOLUTION INTRAVENOUS at 02:05

## 2020-07-31 ASSESSMENT — ENCOUNTER SYMPTOMS
SHORTNESS OF BREATH: 0
WHEEZING: 0
DIARRHEA: 1
EYE PAIN: 0
BLOOD IN STOOL: 0
CONSTIPATION: 0
NAUSEA: 1
RHINORRHEA: 0
VOMITING: 0
SINUS PRESSURE: 0
COUGH: 0
ABDOMINAL PAIN: 1

## 2020-07-31 ASSESSMENT — PAIN DESCRIPTION - LOCATION: LOCATION: LEG

## 2020-07-31 ASSESSMENT — PAIN SCALES - GENERAL: PAINLEVEL_OUTOF10: 8

## 2020-07-31 ASSESSMENT — PAIN DESCRIPTION - DESCRIPTORS: DESCRIPTORS: ACHING

## 2020-07-31 ASSESSMENT — PAIN DESCRIPTION - PAIN TYPE: TYPE: CHRONIC PAIN

## 2020-07-31 NOTE — ED NOTES
Pt medicated per order. Pt denies any further needs at this time. Pt respirations easy and unlabored.       Cecile Berman RN  07/31/20 3411

## 2020-07-31 NOTE — ED NOTES
Pt states he is no longer having diarrhea symptoms and is able to keep fluids down after being medicated. Pt respirations easy and unlabored.       Glenny Gray RN  07/31/20 1590

## 2020-07-31 NOTE — ED NOTES
Pt updated that we need a urine sample. Pt states he will attempt for this. Pt respirations easy and unlabored.       Jennifer Reyes RN  07/31/20 7209

## 2020-07-31 NOTE — ED PROVIDER NOTES
[facility]  eMERGENCY dEPARTMENT eNCOUnter      [address] [facility]       Chief Complaint   Patient presents with    Hyperglycemia    Nausea    Emesis     Nurses Notes reviewed and I agree except as noted inthe HPI. HISTORY OF PRESENT ILLNESS    Mary Ann Alejo is an insulin-dependent diabetic 44 y.o. male who presents to the Emergency Department for the evaluation of nausea and emesis which began 30 minutes prior to arrival. He states he took his BG at home and states it was \"in the 500's\". He says his BGs have been running in the low 200's recently. Patient reports his roommate informed him he was not \"looking good\" so he decided to come in. Currently, he complains of nausea, diarrhea, and vomiting. He reports the diarrhea started approximately 1 month ago. He does report recent antibiotic use for a skin infection. Denies hematemesis, melena and hematochezia. Further complains of dysuria, urinary retention and reports he had a urologic procedure on 07/20/2020. Denies abdominal pain, back pain, nausea, vomiting, chills and fever. Denies shortness of breath, chest pain and palpitations. No other complaints at this time. The HPI was provided by the patient. REVIEW OF SYSTEMS     Review of Systems   Constitutional: Negative for appetite change, chills, fatigue, fever and unexpected weight change. HENT: Negative for ear pain, rhinorrhea and sinus pressure. Eyes: Negative for pain and visual disturbance. Respiratory: Negative for cough, shortness of breath and wheezing. Cardiovascular: Negative for chest pain, palpitations and leg swelling. Gastrointestinal: Positive for abdominal pain, diarrhea (loose stools x1 month. recent antibiotic use) and nausea. Negative for blood in stool, constipation and vomiting. Genitourinary: Positive for dysuria and urgency. Negative for frequency and hematuria. Musculoskeletal: Negative for arthralgias and joint swelling.    Skin: Negative for rash.   Neurological: Negative for dizziness, syncope, weakness, light-headedness and headaches. Hematological: Does not bruise/bleed easily. PAST MEDICAL HISTORY    has a past medical history of Bipolar 2 disorder ([address] BPH (benign prostatic hyperplasia), Diabetes mellitus type 2, uncontrolled ([address] Diabetic peripheral neuropathy ([address] Diabetic polyneuropathy ([address] Diabetic ulcer of right foot associated with type 2 diabetes mellitus ([address] Essential hypertension, GERD (gastroesophageal reflux disease), Hammer toe of left foot, Heart murmur, History of tobacco abuse, Hx of AKA (above knee amputation), right ([address] Macular edema, diabetic, bilateral ([address] Marijuana abuse in remission, Melena, MRSA (methicillin resistant staph aureus) culture positive, Onychomycosis, Other disorders of kidney and ureter in diseases classified elsewhere, Sleep apnea, Thyroid disease, and 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 SURGICAL HISTORY      has a past surgical history that includes other surgical history (Right, 1/14/15); Abscess Drainage (Right); Toe amputation (Right, 1/16/15); Foot Debridement (Right, 07/01/2016); Leg amputation below knee (Right, 07/20/2016); [place] tooth extraction (?when); pr drain infect shoulder bursa (Left, 8/18/2017); pr office/outpt visit,procedure only (Right, 9/20/2018); incision and drainage (Right, 2/18/2019); Leg amputation below knee (Right, 4/4/2019); AMPUTATION ABOVE KNEE (Right, 4/18/2019); Upper gastrointestinal endoscopy (N/A, 3/3/2020); Cholecystectomy, laparoscopic (N/A, 4/1/2020); Endoscopy, colon, diagnostic; and Cystoscopy (N/A, 7/20/2020). CURRENT MEDICATIONS       Previous Medications    AMINO ACIDS COMPLEX PO    Take 1 each by mouth daily Daily AA drink    BLOOD GLUCOSE MONITOR KIT AND SUPPLIES    Accu Chek Sheila meter.  Use to test blood sugars DX:E11.65    BLOOD GLUCOSE MONITOR STRIPS    Accucheck Sheila Test Strips Test blood sugars 4 times daily DX:E11.65    GABAPENTIN (NEURONTIN) 300 MG CAPSULE    Take 1 capsule by mouth 3 times daily for 60 days. INSULIN ASPART (NOVOLOG FLEXPEN) 100 UNIT/ML INJECTION PEN    Inject 25 Units into the skin 3 times daily (before meals)    INSULIN DETEMIR (LEVEMIR FLEXTOUCH) 100 UNIT/ML INJECTION PEN    Inject 85 Units into the skin 2 times daily    INSULIN SYRINGE-NEEDLE U-100 29G X 1/2\" 0.3 ML MISC    1 each by Does not apply route 4 times daily (after meals and at bedtime)    LANCETS MISC    Use to test blood sugars 4 times daily DX:E11.65    LEVOTHYROXINE (SYNTHROID) 50 MCG TABLET    Take 1 tablet by mouth daily    LISINOPRIL (PRINIVIL;ZESTRIL) 5 MG TABLET    Take 1 tablet by mouth daily    METOPROLOL TARTRATE (LOPRESSOR) 25 MG TABLET    Take 1 tablet by mouth 2 times daily    SERTRALINE (ZOLOFT) 50 MG TABLET    Take 1 tablet by mouth daily    TAMSULOSIN (FLOMAX) 0.4 MG CAPSULE    Take 1 capsule by mouth daily Take one capsule daily to facilitate passage of ureteral stone       ALLERGIES     is allergic to pcn [penicillins]; actos [pioglitazone]; clindamycin/lincomycin; and vancomycin. FAMILY HISTORY     He indicated that his mother is . He reported the following about his father: unknown. He indicated that the status of his maternal grandmother is unknown. He indicated that the status of his maternal grandfather is unknown.   family history includes Arthritis in his maternal grandfather; Depression in his mother; Diabetes in his mother; Early Death in his mother; Heart Disease in his maternal grandfather; High Blood Pressure in his mother; High Cholesterol in his mother; Other in his mother; Vision Loss in his maternal grandmother. SOCIAL HISTORY      reports that he quit smoking about 19 years ago. His smoking use included cigars. He started smoking about 35 years ago. He has a 10.00 pack-year smoking history. He has never used smokeless tobacco. He reports previous alcohol use.  He reports film images(s) such as CT, Ultrasound and MRI are read by the radiologist.    No orders to display     LABS:      Labs Reviewed   CBC WITH AUTO DIFFERENTIAL - Abnormal; Notable for the following components:       Result Value    RDW-CV 15.0 (*)     RDW-SD 49.0 (*)     Immature Grans (Abs) 0.14 (*)     All other components within normal limits   COMPREHENSIVE METABOLIC PANEL W/ REFLEX TO MG FOR LOW K - Abnormal; Notable for the following components:    Glucose 428 (*)     Sodium 133 (*)     Chloride 94 (*)     Total Protein 8.1 (*)     All other components within normal limits   URINE RT REFLEX TO CULTURE - Abnormal; Notable for the following components:    Glucose, Ur >= 1000 (*)     Specific Gravity, Urine > 1.030 (*)     All other components within normal limits   LACTIC ACID, PLASMA - Abnormal; Notable for the following components:    Lactic Acid 4.0 (*)     All other components within normal limits   GLOMERULAR FILTRATION RATE, ESTIMATED - Abnormal; Notable for the following components:    Est, Glom Filt Rate 88 (*)     All other components within normal limits   LACTIC ACID, PLASMA - Abnormal; Notable for the following components:    Lactic Acid 2.3 (*)     All other components within normal limits   POCT GLUCOSE - Abnormal; Notable for the following components:    POC Glucose 492 (*)     All other components within normal limits   POCT GLUCOSE - Abnormal; Notable for the following components:    POC Glucose 301 (*)     All other components within normal limits   GASTROINTESTINAL PANEL, MOLECULAR   LIPASE   BETA-HYDROXYBUTYRATE   OSMOLALITY   ANION GAP     EMERGENCY DEPARTMENT COURSE:   Vitals:    Vitals:    07/31/20 0312 07/31/20 0422 07/31/20 0501 07/31/20 0606   BP: (!) 141/85 132/80 (!) 141/70 (!) 147/57   Pulse: 106 109 111 105   Resp: 20 20 20 20   Temp:       SpO2: 98% 98% 95% 98%   Weight:       Height:          3:19 AM EDT: The patient was seen and evaluated.     Patient presents for complaints of hyperglycemia with vomiting and dysuria. Patient states that his glucose is been elevated for the last several days and came in today at the request of his roommate who said he did not look well. He also reports diarrhea that is been ongoing for the past month and notes that he was treated prior to the onset of diarrhea with antibiotics for wound infection. He denies any bloody stool, abdominal pain or fever. He states he is not worried about the diarrhea. He is tachycardic upon arrival.  Initial lactic acid is 4 but he has a normal white blood cell count with no focus of infection other than a complaint of dysuria. His urinalysis shows no sign of infection. No ketonuria. He is hyperglycemic and was treated with IV insulin in the department with improvement. Lactic acid improves after IV fluids. Upon further discussion with the patient, he has not been taking his Levemir at the prescribed 85 units in the morning but has only been taking 50 because he feels like 85 would be too much. He is informed about the importance of compliance with his prescribed dosage unless he develops hypoglycemic episodes as his glucose is grossly elevated. Informed him to notify his primary care provider of the way he has been taking the medication so that he can be monitored as he tapers up to the prescribed dose. Return precautions were discussed and he denied further needs upon discharge. CRITICAL CARE:   None    CONSULTS:  None    PROCEDURES:  None    FINAL IMPRESSION      1. Uncontrolled type 2 diabetes mellitus with hyperglycemia ([address]    2. Dysuria    3.  Non-intractable vomiting with nausea, unspecified vomiting type          DISPOSITION/PLAN   Discharge    PATIENT REFERRED TO:  Charisse Hines, APRN - CNP  [address]  [place] [address]  [phone]    Call today      [facility] EM[facility]  [address] [facility] [place] [address]  [phone]    If symptoms worsen      DISCHARGEMEDICATIONS:  New Prescriptions    ONDANSETRON (ZOFRAN ODT) 4 MG DISINTEGRATING TABLET    Take 1 tablet by mouth every 8 hours as needed for Nausea or Vomiting     (Please note that portions of this note were completedwith a voice recognition program.  Efforts were made to edit the dictations but occasionally words are mis-transcribed.)        Jeanne Castillo PA-C  07/31/20 7923

## 2020-07-31 NOTE — ED TRIAGE NOTES
Pt presents to the ED from home c/o hyperglycemia, nausea and vomiting as well as diarrhea. Pt states this started tonight. Pt states \" I took 25 units of insulin 5-10 min before I left for the hospital to help with my blood sugar which was running in the 500s\". Pt blood sugar is 492 upon arrival to ED. Pt states he is compliant with taking his insulin for type 2 diabetes. Pt states he also feels nauseous and has been vomiting. Pt states he has had episodes of diarrhea. Pt respirations easy and unlabored.

## 2020-08-01 ENCOUNTER — CARE COORDINATION (OUTPATIENT)
Dept: CASE MANAGEMENT | Age: 50
End: 2020-08-01

## 2020-08-03 ENCOUNTER — CARE COORDINATION (OUTPATIENT)
Dept: CASE MANAGEMENT | Age: 50
End: 2020-08-03

## 2020-08-10 ENCOUNTER — TELEPHONE (OUTPATIENT)
Dept: UROLOGY | Age: 50
End: 2020-08-10

## 2020-08-10 ENCOUNTER — OFFICE VISIT (OUTPATIENT)
Dept: UROLOGY | Age: 50
End: 2020-08-10
Payer: MEDICARE

## 2020-08-10 ENCOUNTER — OFFICE VISIT (OUTPATIENT)
Dept: INTERNAL MEDICINE CLINIC | Age: 50
End: 2020-08-10
Payer: MEDICARE

## 2020-08-10 VITALS — BODY MASS INDEX: 34.07 KG/M2 | HEIGHT: 66 IN | WEIGHT: 212 LBS | TEMPERATURE: 97 F

## 2020-08-10 VITALS — HEART RATE: 124 BPM | DIASTOLIC BLOOD PRESSURE: 54 MMHG | SYSTOLIC BLOOD PRESSURE: 108 MMHG | TEMPERATURE: 96.8 F

## 2020-08-10 PROCEDURE — 99214 OFFICE O/P EST MOD 30 MIN: CPT | Performed by: UROLOGY

## 2020-08-10 PROCEDURE — 2022F DILAT RTA XM EVC RTNOPTHY: CPT | Performed by: NURSE PRACTITIONER

## 2020-08-10 PROCEDURE — G8427 DOCREV CUR MEDS BY ELIG CLIN: HCPCS | Performed by: NURSE PRACTITIONER

## 2020-08-10 PROCEDURE — 3017F COLORECTAL CA SCREEN DOC REV: CPT | Performed by: NURSE PRACTITIONER

## 2020-08-10 PROCEDURE — 99214 OFFICE O/P EST MOD 30 MIN: CPT | Performed by: NURSE PRACTITIONER

## 2020-08-10 PROCEDURE — G8417 CALC BMI ABV UP PARAM F/U: HCPCS | Performed by: NURSE PRACTITIONER

## 2020-08-10 PROCEDURE — G8427 DOCREV CUR MEDS BY ELIG CLIN: HCPCS | Performed by: UROLOGY

## 2020-08-10 PROCEDURE — 1036F TOBACCO NON-USER: CPT | Performed by: UROLOGY

## 2020-08-10 PROCEDURE — 3046F HEMOGLOBIN A1C LEVEL >9.0%: CPT | Performed by: NURSE PRACTITIONER

## 2020-08-10 PROCEDURE — G8417 CALC BMI ABV UP PARAM F/U: HCPCS | Performed by: UROLOGY

## 2020-08-10 PROCEDURE — 3017F COLORECTAL CA SCREEN DOC REV: CPT | Performed by: UROLOGY

## 2020-08-10 PROCEDURE — 4004F PT TOBACCO SCREEN RCVD TLK: CPT | Performed by: NURSE PRACTITIONER

## 2020-08-10 NOTE — PATIENT INSTRUCTIONS
Increase Levemir to 60 units twice daily. Humalog 30 units with meals. Add Trulicity 6.03 mg injection once weekly. Patient Education        dulaglutide  Pronunciation:  DOO la MAXIMILIAN tide  Brand:  Trulicity Pen  What is the most important information I should know about dulaglutide? You should not use dulaglutide if you have Multiple Endocrine Neoplasia syndrome type 2 (MEN 2), or a personal or family history of medullary thyroid carcinoma (a type of thyroid cancer). Do not use dulaglutide if you are in a state of diabetic ketoacidosis (call your doctor for treatment). In animal studies, dulaglutide caused thyroid tumors or thyroid cancer. It is not known whether these effects would occur in people using regular doses. Ask your doctor about your risk. Call your doctor at once if you have signs of a thyroid tumor, such as swelling or a lump in your neck, trouble swallowing, a hoarse voice, or shortness of breath. What is dulaglutide? Dulaglutide is an injectable diabetes medicine that helps control blood sugar levels. Dulaglutide is used together with diet and exercise to improve blood sugar control in adults with type 2 diabetes mellitus. Dulaglutide is usually given after other diabetes medicines have been tried without success. This medicine is not for treating type 1 diabetes. Dulaglutide may also be used for purposes not listed in this medication guide. What should I discuss with my healthcare provider before using dulaglutide? You should not use dulaglutide if you are allergic to it, or if you have:  · multiple endocrine neoplasia type 2 (tumors in your glands);  · a personal or family history of medullary thyroid carcinoma (a type of thyroid cancer); or  · diabetic ketoacidosis (call your doctor for treatment).   Tell your doctor if you have ever had:  · pancreatitis;  · a stomach or intestinal disorder;  · gastroesophageal reflux disease (GERD) or slow digestion;  · liver or kidney disease;  · if you also use insulin or oral diabetes medicine; or  · if you have been sick with vomiting or diarrhea. In animal studies, dulaglutide caused thyroid tumors or thyroid cancer. It is not known whether these effects would occur in people using regular doses. Ask your doctor about your risk. It is not known whether this medicine will harm an unborn baby. Tell your doctor if you are pregnant or plan to become pregnant. It may not be safe to breast-feed while using this medicine. Ask your doctor about any risk. Dulaglutide is not approved for use by anyone younger than 25years old. How should I use dulaglutide? Follow all directions on your prescription label and read all medication guides or instruction sheets. Use the medicine exactly as directed. Dulaglutide is injected under the skin once per week. Use dulaglutide on the same day each week at the same time of day. If you change your dosing day, allow at least 3 days to pass between doses. You may use dulaglutide with or without food. Read and carefully follow any Instructions for Use provided with your medicine. Do not use dulaglutide if you don't understand all instructions for proper use. Ask your doctor or pharmacist if you have questions. Your care provider will show you where on your body to inject dulaglutide. Use a different place each time you give an injection. Do not inject into the same place two times in a row. Low blood sugar (hypoglycemia) can happen to everyone who has diabetes. Symptoms include headache, hunger, sweating, irritability, dizziness, nausea, fast heart rate, and feeling anxious or shaky. To quickly treat low blood sugar, always keep a fast-acting source of sugar with you such as fruit juice, hard candy, crackers, raisins, or non-diet soda. Your doctor can prescribe a glucagon emergency injection kit to use in case you have severe hypoglycemia and cannot eat or drink.  Be sure your family and close friends know lump in your neck, trouble swallowing, a hoarse voice, or if you feel short of breath;  · low blood sugar --headache, hunger, weakness, sweating, confusion, irritability, dizziness, fast heart rate, or feeling jittery; or  · kidney problems --little or no urination, swelling in your feet or ankles, feeling tired or short of breath. Tell your doctor if you are sick with vomiting or diarrhea, or if you are sweating more than usual. You can easily become dehydrated while using dulaglutide. Common side effects may include:  · nausea, vomiting, stomach pain;  · diarrhea; or  · loss of appetite. This is not a complete list of side effects and others may occur. Call your doctor for medical advice about side effects. You may report side effects to FDA at 0-116-FDA-0926. What other drugs will affect dulaglutide? Dulaglutide can slow your digestion, and it may take longer for your body to absorb any medicines you take by mouth. Other drugs may affect dulaglutide, including prescription and over-the-counter medicines, vitamins, and herbal products. Tell your doctor about all your current medicines and any medicine you start or stop using. Where can I get more information? Your pharmacist can provide more information about dulaglutide. Remember, keep this and all other medicines out of the reach of children, never share your medicines with others, and use this medication only for the indication prescribed. Every effort has been made to ensure that the information provided by [address] is accurate, up-to-date, and complete, but no guarantee is made to that effect. Drug information contained herein may be time sensitive. [facility] information has been compiled for use by healthcare practitioners and consumers in the [place] and therefore [employer] does not warrant that uses outside of the [place] are appropriate, unless specifically indicated otherwise.  [facility]'s drug information does not endorse drugs, diagnose patients or recommend therapy. [facility]'s drug information is an informational resource designed to assist licensed healthcare practitioners in caring for their patients and/or to serve consumers viewing this service as a supplement to, and not a substitute for, the expertise, skill, knowledge and judgment of healthcare practitioners. The absence of a warning for a given drug or drug combination in no way should be construed to indicate that the drug or drug combination is safe, effective or appropriate for any given patient. [facility] does not assume any responsibility for any aspect of healthcare administered with the aid of information [facility] provides. The information contained herein is not intended to cover all possible uses, directions, precautions, warnings, drug interactions, allergic reactions, or adverse effects. If you have questions about the drugs you are taking, check with your doctor, nurse or pharmacist.  Copyright 4821-2454 [address]: 2.02. Revision date: 7/15/2019. Care instructions adapted under license by [facility] ([facility]). If you have questions about a medical condition or this instruction, always ask your healthcare professional. [place] [address] any warranty or liability for your use of this information.

## 2020-08-10 NOTE — PROGRESS NOTES
FAMILY AND SOCIAL HISTORY:  Past Medical History:   Diagnosis Date    Bipolar 2 disorder [facility])     previously followed with Dr. Carmen Sandoval and Donna Alves in [place] [address] BPH (benign prostatic hyperplasia)     Diabetes mellitus type 2, uncontrolled ([address]     HgbA1c on 4/2/2019 was 9.1.     Diabetic peripheral neuropathy (HCC)     Diabetic polyneuropathy ([address]     Diabetic ulcer of right foot associated with type 2 diabetes mellitus ([address] 12/10/2015    Essential hypertension     \"never been on b/p medication that I know of\"    GERD (gastroesophageal reflux disease)     Hammer toe of left foot     Heart murmur     denies any chest pain or palpitations    History of tobacco abuse     Hx of AKA (above knee amputation), right ([address] 04/18/2019    Dr. Sofy Sanchez edema, diabetic, bilateral ([address] 05/04/2018    Dr. 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 ([address] 6/29/2016     Past Surgical History:   Procedure Laterality Date    ABSCESS DRAINAGE Right     foot    AMPUTATION ABOVE KNEE Right 4/18/2019    RIGHT ABOVE KNEE AMPUTATION performed by Tristan Osorio MD at [address] [place] [address], LAPAROSCOPIC N/A 4/1/2020    ROBOTIC CHOLECYSTECTOMY performed by Teresa Manriquez MD at [address] [place] [address] 7/20/2020    CYSTOSCOPY performed by Beatriz Henriquez MD at [facility], DIAGNOSTIC      FOOT DEBRIDEMENT Right 07/01/2016    I & D    INCISION AND DRAINAGE Right 2/18/2019    I&D RIGHT STUMP performed by Tritsan Osorio MD at [address] [place] [address] Floor Right 07/20/2016    LEG AMPUTATION BELOW KNEE Right 4/4/2019    I&D AND DX: E11.65 400 each 5    metoprolol tartrate (LOPRESSOR) 25 MG tablet Take 1 tablet by mouth 2 times daily 60 tablet 0    lisinopril (PRINIVIL;ZESTRIL) 5 MG tablet Take 1 tablet by mouth daily 30 tablet 0    insulin detemir (LEVEMIR FLEXTOUCH) 100 UNIT/ML injection pen Inject 85 Units into the skin 2 times daily (Patient taking differently: Inject 55 Units into the skin 2 times daily ) 30 pen 5       Pcn [penicillins]; Actos [pioglitazone]; Clindamycin/lincomycin; and Vancomycin  Social History     Tobacco Use   Smoking Status Former Smoker    Packs/day: 1.00    Years: 10.00    Pack years: 10.00    Types: Cigars    Start date: 1985    Last attempt to quit: 2000    Years since quittin.9   Smokeless Tobacco Never Used   Tobacco Comment    Use to smoke now I don't       Social History     Substance and Sexual Activity   Alcohol Use Not Currently    Alcohol/week: 0.0 standard drinks       REVIEW OF SYSTEMS:  Constitutional: negative  Eyes: negative  Respiratory: negative  Cardiovascular: negative  Gastrointestinal: negative  Musculoskeletal: negative  Genitourinary: negative  Skin: negative   Neurological: negative  Hematological/Lymphatic: negative  Psychological: negative    Physical Exam:    This a 44 y.o. male   Vitals:    08/10/20 0801   Temp: 97 °F (36.1 °C)     Constitutional: Patient in no acute distress   Neuro: alert and oriented to person place and time. Psych: Mood and affect normal.  Head: atraumatic normocephalic  Eyes: EOMi  HEENT: neck supple, trachea midline  Lungs: Respiratory effort normal  Cardiovascular:  Normal peripheral pulses  Abdomen: Soft, non-tender, non-distended, No CVA  Bladder: non-tender and not distended. FROMx4, no cyanosis clubbing edema  Skin: warm and dry      Assessment and Plan      1. Enlarged prostate    2. Urinary retention    3.  Benign localized prostatic hyperplasia with lower urinary tract symptoms (LUTS)           Plan:      No follow-ups on file.  Juan Manuel Mehta

## 2020-08-10 NOTE — TELEPHONE ENCOUNTER
Patient scheduled for surgery with Dr Hemal Ellis on 8/21/20. Surgery consent signed. Patient will need urinalysis and Covid-19 which he will do on 8/14/20. Verbally went over surgery instructions. NPO after midnight and hold the insulin the morning of. Patient voiced understanding.

## 2020-08-10 NOTE — PROGRESS NOTES
[facility] [facility]  [address] [facility] [address]  Dept: [phone]  Dept Fax: [phone]  Loc: [phone]     Visit Date:  8/10/2020    Patient:  Maria Ines Christianson  YOB: 1970    HPI:     Chief Complaint   Patient presents with    Diabetes       DM - Last 6 readings 2100 last night 155, 1800 204, 1500 256, fasting 1200 today 358. He states he doesn't understand why this is high (ate two donuts the night before). States he didn't eat anything Friday night and Saturday am the glucose was 400's. He has sugar free wafers from [address] [place] [address] for a snack at 9:00 pm, or fig [employer], which are not sugar free. Novolog 25 units with meals and snacks. Levemir 55 units twice daily. No longer on Metformin, Januvia. Having diarrhea after every meal and snack, even drinks. States this is tan and liquid. His wounds on his finger and leg healed. Dr. Tasneem Morrison is planning surgery 9/21 at 7:30 am.         Medications    Current Outpatient Medications:     Dulaglutide 0.75 MG/0.5ML SOPN, Inject 0.75 mg into the skin once a week, Disp: 4 pen, Rfl: 3    ondansetron (ZOFRAN ODT) 4 MG disintegrating tablet, Take 1 tablet by mouth every 8 hours as needed for Nausea or Vomiting, Disp: 20 tablet, Rfl: 0    sertraline (ZOLOFT) 50 MG tablet, Take 1 tablet by mouth daily, Disp: 30 tablet, Rfl: 5    gabapentin (NEURONTIN) 300 MG capsule, Take 1 capsule by mouth 3 times daily for 60 days. 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 Past Surgical History  The patient  has a past surgical history that includes other surgical history (Right, 1/14/15); Abscess Drainage (Right); Toe amputation (Right, 1/16/15); Foot Debridement (Right, 07/01/2016); Leg amputation below knee (Right, 07/20/2016); [place] tooth extraction (?when); pr drain infect shoulder bursa (Left, 8/18/2017); pr office/outpt visit,procedure only (Right, 9/20/2018); incision and drainage (Right, 2/18/2019); Leg amputation below knee (Right, 4/4/2019); AMPUTATION ABOVE KNEE (Right, 4/18/2019); Upper gastrointestinal endoscopy (N/A, 3/3/2020); Cholecystectomy, laparoscopic (N/A, 4/1/2020); Endoscopy, colon, diagnostic; Cystoscopy (N/A, 7/20/2020); and Cystoscopy (N/A, 8/21/2020). Family History  This patient's family history includes Arthritis in his maternal grandfather; Depression in his mother; Diabetes in his mother; Early Death in his mother; Heart Disease in his maternal grandfather; High Blood Pressure in his mother; High Cholesterol in his mother; Other in his mother; Vision Loss in his maternal grandmother. Social History  Komal Bruner  reports that he has been smoking cigars. He started smoking about 35 years ago. He has a 10.00 pack-year smoking history. He has never used smokeless tobacco. He reports previous alcohol use. He reports that he does not use drugs.     Health Maintenance:    Health Maintenance   Topic Date Due    Hepatitis B vaccine (1 of 3 - Risk 3-dose series) 01/23/1987    DTaP/Tdap/Td vaccine (1 - Tdap) 01/23/1987    Shingles Vaccine (1 of 2) 01/23/2018    Colon cancer screen colonoscopy  01/23/2018    Diabetic retinal exam  05/09/2019    Annual Wellness Visit (AWV)  06/04/2019    Diabetic microalbuminuria test  10/10/2019    Lipid screen  10/10/2019    Diabetic foot exam  02/01/2020    Flu vaccine (1) 09/01/2020    A1C test (Diabetic or Prediabetic)  10/06/2020  TSH testing  06/18/2021    Potassium monitoring  08/22/2021    Creatinine monitoring  08/22/2021    Pneumococcal 0-64 years Vaccine  Completed    HIV screen  Completed    Hepatitis A vaccine  Aged Out    Hib vaccine  Aged Out    Meningococcal (ACWY) vaccine  Aged Out       Subjective:      Review of Systems   Constitutional: Positive for fatigue. Negative for chills and fever. HENT: Negative for sore throat and trouble swallowing. Eyes: Negative for visual disturbance. Respiratory: Positive for shortness of breath. Negative for cough. Cardiovascular: Negative for chest pain and leg swelling. Gastrointestinal: Negative for abdominal pain, constipation, diarrhea, nausea and vomiting. Genitourinary: Positive for difficulty urinating (Retention, due for surgery. ). Musculoskeletal: Negative for arthralgias and myalgias. Skin: Negative for rash and wound. Neurological: Positive for numbness. Psychiatric/Behavioral: Negative for agitation, dysphoric mood, sleep disturbance and suicidal ideas. The patient is not nervous/anxious. Objective:     BP (!) 108/54 (Site: Right Upper Arm, Position: Sitting, Cuff Size: Large Adult)   Pulse 124   Temp 96.8 °F (36 °C) (Temporal)     Physical Exam  Vitals signs reviewed. Constitutional:       General: He is not in acute distress. Appearance: He is well-developed. He is not diaphoretic. HENT:      Head: Normocephalic and atraumatic. Mouth/Throat:      Pharynx: No oropharyngeal exudate. Eyes:      General: No scleral icterus. Right eye: No discharge. Left eye: No discharge. Pupils: Pupils are equal, round, and reactive to light. Neck:      Musculoskeletal: Normal range of motion and neck supple. Thyroid: No thyromegaly. Cardiovascular:      Rate and Rhythm: Normal rate and regular rhythm. Heart sounds: Normal heart sounds. No murmur. No friction rub. No gallop.     Pulmonary:      Effort: Pulmonary effort is normal. No respiratory distress. Breath sounds: Normal breath sounds. No wheezing or rales. Abdominal:      General: There is no distension. Palpations: Abdomen is soft. Tenderness: There is no abdominal tenderness. Musculoskeletal: Normal range of motion. General: No tenderness or deformity. Comments: S/P left AKA   Lymphadenopathy:      Cervical: No cervical adenopathy. Skin:     General: Skin is warm and dry. Coloration: Skin is not pale. Findings: No erythema or rash. Neurological:      Mental Status: He is alert and oriented to person, place, and time. Cranial Nerves: No cranial nerve deficit. Labs Reviewed 8/10/2020:    Lab Results   Component Value Date    WBC 12.2 (H) 08/22/2020    HGB 14.8 08/22/2020    HCT 42.9 08/22/2020     08/22/2020    CHOL 161 10/10/2018    TRIG 328 (H) 10/10/2018    HDL 30 10/10/2018    ALT 32 08/22/2020    AST 51 (H) 08/22/2020     (L) 08/22/2020    K 4.2 08/22/2020    CL 97 (L) 08/22/2020    CREATININE 0.7 08/22/2020    BUN 10 08/22/2020    CO2 25 08/22/2020    TSH 2.130 06/18/2020    PSA 0.78 03/24/2020    INR 1.03 08/22/2020    GLUF 179 (H) 08/24/2016    LABA1C 10.9 (H) 07/06/2020    LABMICR 1.21 10/10/2018       Assessment/Plan      1. Uncontrolled type 2 diabetes mellitus with hyperglycemia, with long-term current use of insulin ([facility])  A1C 10.9%  Questionable compliance - reports he is taking medications, however ,glucose have been much better when hospitalized  Chronically elevated lactic acid - cannot use metformin  Increase Levemir to 60 units twice daily. Humalog 30 units with meals. Add Trulicity 1.16 mg injection once weekly. Attention to diet, exercise water intake. Meter download in two weeks, sooner if problems.     - Dulaglutide 0.75 MG/0.5ML SOPN; Inject 0.75 mg into the skin once a week  Dispense: 4 pen; Refill: 3  - Basic Metabolic Panel; Future    2.  Neuropathy  S/P left AKA    3. Hypothyroidism, unspecified type  TSH 2.130 6/18/2020. On Synthroid 50 mcg daily    4. Essential hypertension  /54. stable      Return in about 2 weeks (around 8/24/2020) for Diabetes. Patient given educational materials - see patient instructions. Discussed use, benefit, and side effects of prescribed medications. All patient questions answered. Pt voiced understanding. Reviewed health maintenance.        Electronically signed MARILYN Cardenas CNP on 8/10/20 at 2:42 PM EDT

## 2020-08-11 ENCOUNTER — TELEPHONE (OUTPATIENT)
Dept: UROLOGY | Age: 50
End: 2020-08-11

## 2020-08-11 NOTE — TELEPHONE ENCOUNTER
Per Bryce Madison at [facility] for CPT Codes 00184,18447 no pre cert is required and not prostate ultrasound is required.  Ref # [id number]

## 2020-08-11 NOTE — TELEPHONE ENCOUNTER
S[facility]GERY [address]  [address] [place], [place] [address] [facility] [employer] [place] [address], [address]      Phone *[phone] *[phone]   Surgical Scheduling Direct Line Phone *[phone] Fax *[phone]      Ilona Pepe 1970 male    [address]  [place] [address]  Marital Status:          Home Phone: [phone]      Cell Phone:    Telephone Information:   Mobile [phone]          Surgeon: Dr. Willa Ulrich  Surgery Date: 8/21/20   Time: 1:30     Procedure: Cystoscopy,Uroloft    Diagnosis: BPH with Obstruction    Important Medical History: In [facility]    Special Inst/Equip:     CPT Codes:    53967  Latex Allergy:  No     Cardiac Device:  No     Anesthesia:  MAC          Admission Type:  Same Day                             Admit Prior to Day of Surgery: No    Case Location:  Main OR           Preadmission Testing:Phone Call              PAT Date and Time:______________________________________________________    PAT Confirmation #: ______________________________________________________    Post Op Visit: ___________________________________________________________    Need Preop Cardiac Clearance: No    Does Patient have Cardiologist/physician?      None    Surgery Confirmation #: __________________________________________________    : ________________________   Date: __________________________     [employer] Name: [facility]

## 2020-08-14 ENCOUNTER — HOSPITAL ENCOUNTER (OUTPATIENT)
Age: 50
Discharge: HOME | End: 2020-08-14
Payer: MEDICARE

## 2020-08-14 LAB
ANION GAP SERPL CALCULATED.3IONS-SCNC: 16 MEQ/L (ref 8–16)
BACTERIA: ABNORMAL /HPF
BILIRUBIN URINE: NEGATIVE
BLOOD, URINE: ABNORMAL
BUN BLDV-MCNC: 13 MG/DL (ref 7–22)
CALCIUM SERPL-MCNC: 9.6 MG/DL (ref 8.5–10.5)
CASTS 2: ABNORMAL /LPF
CASTS UA: ABNORMAL /LPF
CHARACTER, URINE: CLEAR
CHLORIDE BLD-SCNC: 98 MEQ/L (ref 98–111)
CO2: 21 MEQ/L (ref 23–33)
COLOR: YELLOW
CREAT SERPL-MCNC: 0.7 MG/DL (ref 0.4–1.2)
CRYSTALS, UA: ABNORMAL
EPITHELIAL CELLS, UA: ABNORMAL /HPF
GFR SERPL CREATININE-BSD FRML MDRD: > 90 ML/MIN/1.73M2
GLUCOSE BLD-MCNC: 238 MG/DL (ref 70–108)
GLUCOSE URINE: >= 1000 MG/DL
KETONES, URINE: NEGATIVE
LEUKOCYTE ESTERASE, URINE: ABNORMAL
MISCELLANEOUS 2: ABNORMAL
NITRITE, URINE: NEGATIVE
PH UA: 5 (ref 5–9)
POTASSIUM SERPL-SCNC: 4.8 MEQ/L (ref 3.5–5.2)
PROTEIN UA: 30
RBC URINE: ABNORMAL /HPF
RENAL EPITHELIAL, UA: ABNORMAL
SODIUM BLD-SCNC: 135 MEQ/L (ref 135–145)
SPECIFIC GRAVITY, URINE: 1.03 (ref 1–1.03)
UROBILINOGEN, URINE: 0.2 EU/DL (ref 0–1)
WBC UA: ABNORMAL /HPF
YEAST: ABNORMAL

## 2020-08-14 PROCEDURE — U0003 INFECTIOUS AGENT DETECTION BY NUCLEIC ACID (DNA OR RNA); SEVERE ACUTE RESPIRATORY SYNDROME CORONAVIRUS 2 (SARS-COV-2) (CORONAVIRUS DISEASE [COVID-19]), AMPLIFIED PROBE TECHNIQUE, MAKING USE OF HIGH THROUGHPUT TECHNOLOGIES AS DESCRIBED BY CMS-2020-01-R: HCPCS

## 2020-08-14 PROCEDURE — 87086 URINE CULTURE/COLONY COUNT: CPT

## 2020-08-14 PROCEDURE — 36415 COLL VENOUS BLD VENIPUNCTURE: CPT

## 2020-08-14 PROCEDURE — 80048 BASIC METABOLIC PNL TOTAL CA: CPT

## 2020-08-14 PROCEDURE — 81001 URINALYSIS AUTO W/SCOPE: CPT

## 2020-08-14 NOTE — PROGRESS NOTES
Patient contacted and the following information given:  NPO after midnight  [employer] and drivers license  Wear comfortable clean clothing  Do not bring jewelry  Shower night before and morning of surgery with a liquid antibacterial soap  Bring list of medications with dosage and how often taken  Follow all instructions given by your physician   needed at discharge  No visitors allowed in with patient at this time  Please bring and wear mask  Call [phone] for any questions

## 2020-08-15 LAB
ORGANISM: ABNORMAL
URINE CULTURE REFLEX: ABNORMAL

## 2020-08-17 LAB — SARS-COV-2: NOT DETECTED

## 2020-08-19 ENCOUNTER — PREP FOR PROCEDURE (OUTPATIENT)
Dept: UROLOGY | Age: 50
End: 2020-08-19

## 2020-08-19 RX ORDER — SODIUM CHLORIDE 9 MG/ML
INJECTION, SOLUTION INTRAVENOUS CONTINUOUS
Status: CANCELLED | OUTPATIENT
Start: 2020-08-21

## 2020-08-20 ENCOUNTER — TELEPHONE (OUTPATIENT)
Dept: UROLOGY | Age: 50
End: 2020-08-20

## 2020-08-20 NOTE — TELEPHONE ENCOUNTER
Attempted to call the patient. After checking [employer] voicemail left stating COVID testing was negative. He voiced understanding.

## 2020-08-21 ENCOUNTER — ANESTHESIA (OUTPATIENT)
Dept: OPERATING ROOM | Age: 50
End: 2020-08-21
Payer: MEDICARE

## 2020-08-21 ENCOUNTER — ANESTHESIA EVENT (OUTPATIENT)
Dept: OPERATING ROOM | Age: 50
End: 2020-08-21
Payer: MEDICARE

## 2020-08-21 ENCOUNTER — HOSPITAL ENCOUNTER (OUTPATIENT)
Age: 50
Setting detail: OUTPATIENT SURGERY
Discharge: HOME | End: 2020-08-21
Attending: UROLOGY | Admitting: UROLOGY
Payer: MEDICARE

## 2020-08-21 ENCOUNTER — TELEPHONE (OUTPATIENT)
Dept: UROLOGY | Age: 50
End: 2020-08-21

## 2020-08-21 VITALS
DIASTOLIC BLOOD PRESSURE: 70 MMHG | HEIGHT: 66 IN | HEART RATE: 90 BPM | TEMPERATURE: 98 F | RESPIRATION RATE: 16 BRPM | BODY MASS INDEX: 34.07 KG/M2 | SYSTOLIC BLOOD PRESSURE: 140 MMHG | OXYGEN SATURATION: 98 % | WEIGHT: 212 LBS

## 2020-08-21 VITALS
OXYGEN SATURATION: 100 % | DIASTOLIC BLOOD PRESSURE: 64 MMHG | RESPIRATION RATE: 2 BRPM | SYSTOLIC BLOOD PRESSURE: 107 MMHG

## 2020-08-21 LAB — GLUCOSE BLD-MCNC: 247 MG/DL (ref 70–108)

## 2020-08-21 PROCEDURE — 2500000003 HC RX 250 WO HCPCS: Performed by: NURSE ANESTHETIST, CERTIFIED REGISTERED

## 2020-08-21 PROCEDURE — 3600000003 HC SURGERY LEVEL 3 BASE: Performed by: UROLOGY

## 2020-08-21 PROCEDURE — 7100000001 HC PACU RECOVERY - ADDTL 15 MIN: Performed by: UROLOGY

## 2020-08-21 PROCEDURE — 2580000003 HC RX 258: Performed by: UROLOGY

## 2020-08-21 PROCEDURE — 82948 REAGENT STRIP/BLOOD GLUCOSE: CPT

## 2020-08-21 PROCEDURE — 2580000003 HC RX 258: Performed by: NURSE ANESTHETIST, CERTIFIED REGISTERED

## 2020-08-21 PROCEDURE — 3700000000 HC ANESTHESIA ATTENDED CARE: Performed by: UROLOGY

## 2020-08-21 PROCEDURE — L8699 PROSTHETIC IMPLANT NOS: HCPCS | Performed by: UROLOGY

## 2020-08-21 PROCEDURE — 6360000002 HC RX W HCPCS: Performed by: NURSE ANESTHETIST, CERTIFIED REGISTERED

## 2020-08-21 PROCEDURE — 2780000010 HC IMPLANT OTHER: Performed by: UROLOGY

## 2020-08-21 PROCEDURE — 3700000001 HC ADD 15 MINUTES (ANESTHESIA): Performed by: UROLOGY

## 2020-08-21 PROCEDURE — 7100000011 HC PHASE II RECOVERY - ADDTL 15 MIN: Performed by: UROLOGY

## 2020-08-21 PROCEDURE — 2709999900 HC NON-CHARGEABLE SUPPLY: Performed by: UROLOGY

## 2020-08-21 PROCEDURE — 3600000013 HC SURGERY LEVEL 3 ADDTL 15MIN: Performed by: UROLOGY

## 2020-08-21 PROCEDURE — 7100000000 HC PACU RECOVERY - FIRST 15 MIN: Performed by: UROLOGY

## 2020-08-21 PROCEDURE — 6360000002 HC RX W HCPCS: Performed by: UROLOGY

## 2020-08-21 PROCEDURE — 7100000010 HC PHASE II RECOVERY - FIRST 15 MIN: Performed by: UROLOGY

## 2020-08-21 PROCEDURE — 6370000000 HC RX 637 (ALT 250 FOR IP)

## 2020-08-21 DEVICE — Z DISCONTINUED USE 2308351 IMPLANT URO SAFETY NDL STRL UROLIFT DISP: Type: IMPLANTABLE DEVICE | Site: PROSTATE | Status: FUNCTIONAL

## 2020-08-21 RX ORDER — FENTANYL CITRATE 50 UG/ML
INJECTION, SOLUTION INTRAMUSCULAR; INTRAVENOUS PRN
Status: DISCONTINUED | OUTPATIENT
Start: 2020-08-21 | End: 2020-08-21 | Stop reason: SDUPTHER

## 2020-08-21 RX ORDER — FENTANYL CITRATE 50 UG/ML
50 INJECTION, SOLUTION INTRAMUSCULAR; INTRAVENOUS EVERY 5 MIN PRN
Status: DISCONTINUED | OUTPATIENT
Start: 2020-08-21 | End: 2020-08-21 | Stop reason: HOSPADM

## 2020-08-21 RX ORDER — SODIUM CHLORIDE 9 MG/ML
INJECTION, SOLUTION INTRAVENOUS CONTINUOUS
Status: DISCONTINUED | OUTPATIENT
Start: 2020-08-21 | End: 2020-08-21 | Stop reason: HOSPADM

## 2020-08-21 RX ORDER — PROPOFOL 10 MG/ML
INJECTION, EMULSION INTRAVENOUS PRN
Status: DISCONTINUED | OUTPATIENT
Start: 2020-08-21 | End: 2020-08-21 | Stop reason: SDUPTHER

## 2020-08-21 RX ORDER — SODIUM CHLORIDE 9 MG/ML
INJECTION, SOLUTION INTRAVENOUS CONTINUOUS PRN
Status: DISCONTINUED | OUTPATIENT
Start: 2020-08-21 | End: 2020-08-21 | Stop reason: SDUPTHER

## 2020-08-21 RX ORDER — OXYCODONE HYDROCHLORIDE AND ACETAMINOPHEN 5; 325 MG/1; MG/1
TABLET ORAL
Status: COMPLETED
Start: 2020-08-21 | End: 2020-08-21

## 2020-08-21 RX ORDER — SULFAMETHOXAZOLE AND TRIMETHOPRIM 800; 160 MG/1; MG/1
1 TABLET ORAL 2 TIMES DAILY
Qty: 10 TABLET | Refills: 0 | Status: SHIPPED | OUTPATIENT
Start: 2020-08-21 | End: 2020-08-26

## 2020-08-21 RX ORDER — LIDOCAINE HCL/PF 100 MG/5ML
SYRINGE (ML) INJECTION PRN
Status: DISCONTINUED | OUTPATIENT
Start: 2020-08-21 | End: 2020-08-21 | Stop reason: SDUPTHER

## 2020-08-21 RX ORDER — MIDAZOLAM HYDROCHLORIDE 1 MG/ML
INJECTION INTRAMUSCULAR; INTRAVENOUS PRN
Status: DISCONTINUED | OUTPATIENT
Start: 2020-08-21 | End: 2020-08-21 | Stop reason: SDUPTHER

## 2020-08-21 RX ORDER — LABETALOL 20 MG/4 ML (5 MG/ML) INTRAVENOUS SYRINGE
10 EVERY 10 MIN PRN
Status: DISCONTINUED | OUTPATIENT
Start: 2020-08-21 | End: 2020-08-21 | Stop reason: HOSPADM

## 2020-08-21 RX ORDER — ONDANSETRON 2 MG/ML
INJECTION INTRAMUSCULAR; INTRAVENOUS PRN
Status: DISCONTINUED | OUTPATIENT
Start: 2020-08-21 | End: 2020-08-21 | Stop reason: SDUPTHER

## 2020-08-21 RX ORDER — MORPHINE SULFATE 2 MG/ML
2 INJECTION, SOLUTION INTRAMUSCULAR; INTRAVENOUS EVERY 5 MIN PRN
Status: DISCONTINUED | OUTPATIENT
Start: 2020-08-21 | End: 2020-08-21 | Stop reason: HOSPADM

## 2020-08-21 RX ADMIN — CEFAZOLIN 2 G: 10 INJECTION, POWDER, FOR SOLUTION INTRAVENOUS at 14:26

## 2020-08-21 RX ADMIN — FENTANYL CITRATE 50 MCG: 50 INJECTION, SOLUTION INTRAMUSCULAR; INTRAVENOUS at 14:20

## 2020-08-21 RX ADMIN — PROPOFOL 160 MG: 10 INJECTION, EMULSION INTRAVENOUS at 14:20

## 2020-08-21 RX ADMIN — SODIUM CHLORIDE: 9 INJECTION, SOLUTION INTRAVENOUS at 14:11

## 2020-08-21 RX ADMIN — FENTANYL CITRATE 50 MCG: 50 INJECTION, SOLUTION INTRAMUSCULAR; INTRAVENOUS at 14:36

## 2020-08-21 RX ADMIN — Medication 60 MG: at 14:20

## 2020-08-21 RX ADMIN — MIDAZOLAM HYDROCHLORIDE 2 MG: 1 INJECTION, SOLUTION INTRAMUSCULAR; INTRAVENOUS at 14:16

## 2020-08-21 RX ADMIN — ONDANSETRON HYDROCHLORIDE 4 MG: 4 INJECTION, SOLUTION INTRAMUSCULAR; INTRAVENOUS at 14:43

## 2020-08-21 RX ADMIN — OXYCODONE HYDROCHLORIDE AND ACETAMINOPHEN 1 TABLET: 5; 325 TABLET ORAL at 16:32

## 2020-08-21 RX ADMIN — SODIUM CHLORIDE: 9 INJECTION, SOLUTION INTRAVENOUS at 12:19

## 2020-08-21 ASSESSMENT — PAIN SCALES - GENERAL
PAINLEVEL_OUTOF10: 10
PAINLEVEL_OUTOF10: 10
PAINLEVEL_OUTOF10: 0
PAINLEVEL_OUTOF10: 10

## 2020-08-21 ASSESSMENT — PAIN DESCRIPTION - LOCATION
LOCATION: PENIS

## 2020-08-21 ASSESSMENT — PULMONARY FUNCTION TESTS
PIF_VALUE: 14
PIF_VALUE: 14
PIF_VALUE: 2
PIF_VALUE: 14
PIF_VALUE: 4
PIF_VALUE: 0
PIF_VALUE: 16
PIF_VALUE: 14
PIF_VALUE: 24
PIF_VALUE: 16
PIF_VALUE: 1
PIF_VALUE: 2
PIF_VALUE: 2
PIF_VALUE: 20
PIF_VALUE: 19
PIF_VALUE: 1
PIF_VALUE: 19
PIF_VALUE: 4
PIF_VALUE: 24
PIF_VALUE: 21
PIF_VALUE: 2
PIF_VALUE: 11
PIF_VALUE: 15
PIF_VALUE: 14
PIF_VALUE: 31
PIF_VALUE: 2
PIF_VALUE: 1
PIF_VALUE: 1
PIF_VALUE: 2
PIF_VALUE: 5
PIF_VALUE: 5
PIF_VALUE: 24
PIF_VALUE: 14
PIF_VALUE: 14
PIF_VALUE: 1
PIF_VALUE: 15
PIF_VALUE: 23
PIF_VALUE: 23
PIF_VALUE: 22
PIF_VALUE: 3
PIF_VALUE: 1

## 2020-08-21 ASSESSMENT — PAIN DESCRIPTION - PAIN TYPE
TYPE: SURGICAL PAIN

## 2020-08-21 ASSESSMENT — LIFESTYLE VARIABLES: SMOKING_STATUS: 1

## 2020-08-21 ASSESSMENT — PAIN - FUNCTIONAL ASSESSMENT: PAIN_FUNCTIONAL_ASSESSMENT: 0-10

## 2020-08-21 NOTE — PROGRESS NOTES
Oriented to sds  11       .pt was asked and agreed to first name and last initial being put on white boards. Allergy and fall risks applied. SCD Applied to patient. Warming blanket applied to patient. Pt denies any abuse or thoughts of suicide.  Only call for ride [phone] [facility] friend

## 2020-08-21 NOTE — PROGRESS NOTES
1457 Awake and oriented on arrival to PACU , denies any pain but feels the need to void , pt reminded he has a hernandez  1510 O2 off , Dr Linden Toledo to bedside to speak to pt  1515 Pt resting resp easy   1525 pt awakens to name , states pain a # 10   but want to go to bathroom  1530 meets criteria for discharge, transported to [facility]

## 2020-08-21 NOTE — ANESTHESIA POSTPROCEDURE EVALUATION
Department of Anesthesiology  Postprocedure Note    Patient: Dae Modi  MRN: [id number]  YOB: 1970  Date of evaluation: 8/21/2020  Time:  3:22 PM     Procedure Summary     Date:  08/21/20 Room / Location:  [facility]    Anesthesia Start:  7855 Anesthesia Stop:  1500    Procedure:  CYSTOSCOPY, UROLIFT (N/A ) Diagnosis:  (BPH WITH OBSTRUCTION)    Surgeon:  Belinda Voss MD Responsible Provider:  Pete Terrell MD    Anesthesia Type:  general ASA Status:  4          Anesthesia Type: general    Everette Phase I: Everette Score: 9    Everette Phase II:      Last vitals: Reviewed and per EMR flowsheets. Anesthesia Post Evaluation    Comments: Allie Villarreal 58  POST-ANESTHESIA NOTE       Name:  Dae Modi                                         Age:  44 y.o. MRN:  [id number]      Last Vitals:  /71   Pulse 94   Temp 97.9 °F (36.6 °C) (Temporal)   Resp 12   Ht 5' 6\" (1.676 m)   Wt 212 lb (96.2 kg)   SpO2 98%   BMI 34.22 kg/m²   Patient Vitals in the past 4 hrs:  08/21/20 1510, BP:133/71, Pulse:94, Resp:12, SpO2:98 %  08/21/20 1505, BP:126/68, Pulse:97, Resp:12, SpO2:100 %  08/21/20 1457, BP:124/67, Temp:97.9 °F (36.6 °C), Temp src:Temporal, Pulse:97, Resp:10, SpO2:100 %  08/21/20 1159, BP:136/84, Temp:97.6 °F (36.4 °C), Temp src:Temporal, Pulse:102, Resp:18, SpO2:100 %, Height:5' 6\" (1.676 m), Weight:212 lb (96.2 kg)    Level of Consciousness:  Awake    Respiratory:  Stable    Oxygen Saturation:  Stable    Cardiovascular:  Stable    Hydration:  Adequate    PONV:  Stable    Post-op Pain:  Adequate analgesia    Post-op Assessment:  No apparent anesthetic complications    Additional Follow-Up / Treatment / Comment:  None    Anne Brown DO  August 21, 2020   3:22 PM

## 2020-08-21 NOTE — H&P
[facility]  Urology H&P Note     Patient:  Edgardo Eugene  MRN: [id number]  YOB: 1970    ATTENDING: Luis Coronado     CHIEF COMPLAINT:  BPH with obstruction, urinary retention    HISTORY OF PRESENT ILLNESS:   The patient is a 44 y.o. male who presents with BPH with urinary retention,  He has diabetic cystopathy, and wants to exhaust all options before needing CIC. Patient's old records, notes and chart reviewed and summarized above. Past Medical History:    Past Medical History:   Diagnosis Date    Bipolar 2 disorder [facility])     previously followed with Dr. Ozella Lennox and Erika Lopez in [facility] BPH (benign prostatic hyperplasia)     Diabetes mellitus type 2, uncontrolled ([address]     HgbA1c on 4/2/2019 was 9.1.     Diabetic peripheral neuropathy (HCC)     Diabetic polyneuropathy ([address]     Diabetic ulcer of right foot associated with type 2 diabetes mellitus ([address] 12/10/2015    Essential hypertension     \"never been on b/p medication that I know of\"    GERD (gastroesophageal reflux disease)     Hammer toe of left foot     Heart murmur     denies any chest pain or palpitations    History of tobacco abuse     Hx of AKA (above knee amputation), right ([address] 04/18/2019    Dr. Yasmine Quintero edema, diabetic, bilateral ([address] 05/04/2018    Dr. Manolo Sheehan referred to retina specialist for 2nd opinion    Marijuana abuse in remission     Melena     MRSA (methicillin resistant staph aureus) culture positive     Onychomycosis     Other disorders of kidney and ureter in diseases classified elsewhere     Sleep apnea     no cpap    Thyroid disease     WD-Skin ulcer of fourth toe of right foot with necrosis of bone ([address] 6/29/2016       Past Surgical History:    Past Surgical History:   Procedure Laterality Date    ABSCESS DRAINAGE Right     foot    AMPUTATION ABOVE KNEE Right 4/18/2019    RIGHT ABOVE KNEE AMPUTATION performed by Aletha Pruitt MD at [address] [facility], LAPAROSCOPIC N/A 4/1/2020    ROBOTIC CHOLECYSTECTOMY performed by [facility]jan MD [facility] [address]    CYSTOSCOPY performed by Chitra Mancuso MD at [facility], DIAGNOSTIC      FOOT DEBRIDEMENT Right 07/01/2016    I & D    INCISION AND DRAINAGE Right 2/18/2019    I&D RIGHT STUMP performed by Aletha Pruitt MD at [address] [facility] [facility] Right 07/20/2016    LEG AMPUTATION BELOW KNEE Right 4/4/2019    I&D AND REVISION OF AMPUTATION RIGHT LEG performed by Aletha Pruitt MD at [facility] Right 1/14/15    sole of foot I&D    [place] DRAIN INFECT SHOULDER BURSA Left 8/18/2017    LEFT SHOULDER INCISION AND DRAINAGE performed by Aletha Pruitt MD at [address] [facility] OFFICE/OUTPT [facility] Right 9/20/2018    EXCISIONAL DEBRIDEMENT RIGHT BKA STUMP performed by Airam Faria MD at [address] Right 1/16/15    2nd toe with wound vac applied    UPPER GASTROINTESTINAL ENDOSCOPY N/A 3/3/2020    EGD DILATION SAVORY performed by Eileen Brennan MD at [facility]  ? when     Previous Urologic Surgery: none  Medications Prior to Admission:    Prior to Admission medications    Medication Sig Start Date End Date Taking? Authorizing Provider   Dulaglutide 0.75 MG/0.5ML SOPN Inject 0.75 mg into the skin once a week 8/10/20  Yes MARILYN Jalloh CNP   sertraline (ZOLOFT) 50 MG tablet Take 1 tablet by mouth daily 7/29/20  Yes MARILYN Jalloh CNP   gabapentin (NEURONTIN) 300 MG capsule Take 1 capsule by mouth 3 times daily for 60 days.  7/29/20 9/27/20 Yes MARILYN Jalloh CNP   levothyroxine (SYNTHROID) 50 MCG tablet Take 1 tablet by mouth daily 7/6/20  Yes MARILYN Somers CNP   insulin aspart (NOVOLOG FLEXPEN) 100 UNIT/ML injection pen Inject 25 Units into the skin 3 times daily (before meals) Not on file    Transportation needs     Medical: Not on file     Non-medical: Not on file   Tobacco Use    Smoking status: Current Every Day Smoker     Packs/day: 1.00     Years: 10.00     Pack years: 10.00     Types: Cigars     Start date: 1985     Last attempt to quit: 2000     Years since quittin.0    Smokeless tobacco: Never Used    Tobacco comment: Use to smoke now I don't   Substance and Sexual Activity    Alcohol use: Not Currently     Alcohol/week: 0.0 standard drinks    Drug use: No     Comment: 30 YEARS AGO    Sexual activity: Yes     Partners: Female   Lifestyle    Physical activity     Days per week: Not on file     Minutes per session: Not on file    Stress: Not on file   Relationships    Social connections     Talks on phone: Not on file     Gets together: Not on file     Attends Restorationist service: Not on file     Active member of club or organization: Not on file     Attends meetings of clubs or organizations: Not on file     Relationship status: Not on file    Intimate partner violence     Fear of current or ex partner: Not on file     Emotionally abused: Not on file     Physically abused: Not on file     Forced sexual activity: Not on file   Other Topics Concern    Not on file   Social History Narrative    Not on file       Family History:    Family History   Problem Relation Age of Onset    Diabetes Mother     Other Mother         pneumonia, H1N1    Depression Mother     Early Death Mother     High Blood Pressure Mother     High Cholesterol Mother     Vision Loss Maternal Grandmother     Arthritis Maternal Grandfather     Heart Disease Maternal Grandfather      Previous Urologic Family history: none  REVIEW OF SYSTEMS:  All systems reviewed and negative except for that already noted in the HPI.     Physical Exam:      Patient Vitals for the past 24 hrs:   BP Temp Temp src Pulse Resp SpO2 Height Weight   20 1159 136/84 97.6 °F (36.4 °C) Temporal 102 18 100 % 5' 6\" (1.676 m) 212 lb (96.2 kg)     Constitutional: Patient in no acute distress; Neuro: alert and oriented to person place and time. Psych: Mood and affect normal.  Skin: Normal  Lungs: Respiratory effort normal  Cardiovascular:  Normal peripheral pulses  Abdomen: Soft, non-tender, non-distended with no CVA, flank pain, hepatosplenomegaly or hernia. Kidneys normal.  Bladder non-tender and not distended. Lymphatics: no palpable lymphadenopathy        LABS:   No results for input(s): WBC, HGB, HCT, MCV, PLT in the last 72 hours. No results for input(s): NA, K, CL, CO2, PHOS, BUN, CREATININE in the last 72 hours. Invalid input(s): CA  Lab Results   Component Value Date    PSA 0.78 03/24/2020       Additional Lab/culture results:    Urinalysis: No results for input(s): COLORU, PHUR, LABCAST, WBCUA, RBCUA, MUCUS, TRICHOMONAS, YEAST, BACTERIA, CLARITYU, SPECGRAV, LEUKOCYTESUR, UROBILINOGEN, Elizabeth Xiao in the last 72 hours. Invalid input(s): NITRATE, GLUCOSEUKETONESUAMORPHOUS     -----------------------------------------------------------------  Imaging Results:    Assessment and Plan   Impression:    Patient Active Problem List   Diagnosis    Cellulitis    Status post below knee amputation of right lower extremity ([facility]    Gait disturbance    Sebaceous cyst    Uncontrolled type 2 diabetes mellitus with hyperglycemia, with long-term current use of insulin (HCC)    Severe bipolar II disorder, recent episode major depressive, remission (HCC)    Bipolar 1 disorder ([facility])    Leukocytosis    Lactic acidosis    Hyponatremia    Normocytic anemia    Obesity (BMI 30-39. 9)    History of noncompliance with medical treatment    Essential hypertension    Tobacco dependence    Gastroesophageal reflux disease without esophagitis    History of marijuana use    Physical debility    Anemia    Electrolyte imbalance    Type 2 diabetes mellitus with hyperglycemia, with long-term current use of insulin [facility]    Weakness    Infection of above knee amputation stump of right leg (HCC)    Above knee amputation of right lower extremity (HCC)    Sepsis ([facility]    Vitamin D deficiency    COPD exacerbation (HCC)    Influenza B    Depression, recurrent (HCC)    Major depressive disorder, recurrent (HCC)    GI bleed    Elevated lipase    Pressure ulcer of left calf, unstageable (HCC)    Other psychoactive substance abuse, uncomplicated (HCC)    Calculus of gallbladder without cholecystitis without obstruction    Right upper quadrant abdominal pain    Cellulitis of hand    Pedal edema    MURALI (obstructive sleep apnea)    Shortness of breath    Hypothyroid    Anxiety and depression    Dyspnea    Sinus tachycardia    Metabolic acidosis    Diabetic ulcer of lower extremity (HCC)    Ulcer, skin, non-healing (HCC)    Streptococcus infection, group A    Localized edema       Plan:    To OR for urolift

## 2020-08-21 NOTE — ANESTHESIA PRE PROCEDURE
Department of Anesthesiology  Preprocedure Note       Name:  Devika Trent   Age:  44 y.o.  :  1970                                          MRN:  [id number]         Date:  2020      Surgeon: Sandhya Castillo):  Cande Phan MD    Procedure: Procedure(s):  CYSTOSCOPY, UROLIFT    Medications prior to admission:   Prior to Admission medications    Medication Sig Start Date End Date Taking? Authorizing Provider   Dulaglutide 0.75 MG/0.5ML SOPN Inject 0.75 mg into the skin once a week 8/10/20  Yes MARILYN Cruz CNP   sertraline (ZOLOFT) 50 MG tablet Take 1 tablet by mouth daily 20  Yes MARILYN Cruz CNP   gabapentin (NEURONTIN) 300 MG capsule Take 1 capsule by mouth 3 times daily for 60 days. 20 Yes MARILYN Cruz CNP   levothyroxine (SYNTHROID) 50 MCG tablet Take 1 tablet by mouth daily 20  Yes MARILYN Cruz CNP   insulin aspart (NOVOLOG FLEXPEN) 100 UNIT/ML injection pen Inject 25 Units into the skin 3 times daily (before meals) 20 Yes MARILYN Cruz CNP   blood glucose monitor kit and supplies Accu Chek Sheila meter.  Use to test blood sugars DX:E11.65 20  Yes MARILYN Cruz CNP   blood glucose monitor strips Accucheck Sheila Test Strips Test blood sugars 4 times daily DX:E11.65 20  Yes MARILYN Cruz CNP   Lancets MISC Use to test blood sugars 4 times daily DX:E11.65 20  Yes MARILYN Cruz CNP   metoprolol tartrate (LOPRESSOR) 25 MG tablet Take 1 tablet by mouth 2 times daily 20  Yes Jose Pham PA-C   lisinopril (PRINIVIL;ZESTRIL) 5 MG tablet Take 1 tablet by mouth daily 20  Yes MARILYN Sinclair CNP   insulin detemir (LEVEMIR FLEXTOUCH) 100 UNIT/ML injection pen Inject 85 Units into the skin 2 times daily  Patient taking differently: Inject 55 Units into the skin 2 times daily  20  Yes MARILYN Cruz - ALEJANDRO   AMINO ACIDS COMPLEX PO Take 1 each by mouth daily Daily AA drink   Yes Historical Provider, MD   ondansetron (ZOFRAN ODT) 4 MG disintegrating tablet Take 1 tablet by mouth every 8 hours as needed for Nausea or Vomiting 7/31/20   Jose Pham PA-C   tamsulosin (FLOMAX) 0.4 MG capsule Take 1 capsule by mouth daily Take one capsule daily to facilitate passage of ureteral stone 7/20/20   Cande Phan MD   Insulin Syringe-Needle U-100 29G X 1/2\" 0.3 ML MISC 1 each by Does not apply route 4 times daily (after meals and at bedtime) 4/30/19   Ethan Gilman MD       Current medications:    Current Facility-Administered Medications   Medication Dose Route Frequency Provider Last Rate Last Dose    0.9 % sodium chloride infusion   Intravenous Continuous Cande Phan  mL/hr at 08/21/20 1219      ceFAZolin (ANCEF) 2 g in dextrose 5 % 50 mL IVPB  2 g Intravenous 30 Min Pre-Op Cande Phan MD           Allergies: Allergies   Allergen Reactions    Pcn [Penicillins] Shortness Of Breath, Nausea And Vomiting and Other (See Comments)     Does not remember reactions. Has TOLERATED amoxicillin and several different cephalosporins.  Actos [Pioglitazone] Swelling    Clindamycin/Lincomycin Itching    Vancomycin Hives      Rash, with erythematous plaques to abdomen, shoulders, and proximal upper extremities with pruritis, persisted with 2nd dose administered slower.          Problem List:    Patient Active Problem List   Diagnosis Code    Cellulitis L03.90    Status post below knee amputation of right lower extremity ([facility] Z89.511    Gait disturbance R26.9    Sebaceous cyst L72.3    Uncontrolled type 2 diabetes mellitus with hyperglycemia, with long-term current use of insulin ([facility]) E11.65, Z79.4    Severe bipolar II disorder, recent episode major depressive, remission ([facility]) F31.81    Bipolar 1 disorder ([facility]) F31.9    Leukocytosis D72.829    Lactic acidosis E87.2    Hyponatremia E87.1    Normocytic anemia D64.9    Obesity (BMI 30-39. 9) E66.9    History of noncompliance with medical treatment Z91.19    Essential hypertension I10    Tobacco dependence F17.200    Gastroesophageal reflux disease without esophagitis K21.9    History of marijuana use Z87.898    Physical debility R53.81    Anemia D64.9    Electrolyte imbalance E87.8    Type 2 diabetes mellitus with hyperglycemia, with long-term current use of insulin ([facility]) E11.65, Z79.4    Weakness R53.1    Infection of above knee amputation stump of right leg ([facility]) T87.43    Above knee amputation of right lower extremity ([facility]) Q90.107G    Sepsis ([facility]) A41.9    Vitamin D deficiency E55.9    COPD exacerbation ([facility]) J44.1    Influenza B J10.1    Depression, recurrent ([facility]) F33.9    Major depressive disorder, recurrent ([facility]) F33.9    GI bleed K92.2    Elevated lipase R74.8    Pressure ulcer of left calf, unstageable ([facility]) L89.890    Other psychoactive substance abuse, uncomplicated ([facility]) P60.09    Calculus of gallbladder without cholecystitis without obstruction K80.20    Right upper quadrant abdominal pain R10.11    Cellulitis of hand L03.119    Pedal edema R60.0    MURALI (obstructive sleep apnea) G47.33    Shortness of breath R06.02    Hypothyroid E03.9    Anxiety and depression F41.9, F32.9    Dyspnea R06.00    Sinus tachycardia E48.7    Metabolic acidosis Y24.0    Diabetic ulcer of lower extremity ([facility]) E11.622, L97.909    Ulcer, skin, non-healing ([facility]) L98.499    Streptococcus infection, group A B95.0    Localized edema R60.0       Past Medical History:        Diagnosis Date    Bipolar 2 disorder ([facility])     previously followed with Dr. Carmen Sandoval and Donna Alves [facility] [place] BPH (benign prostatic hyperplasia)     Diabetes mellitus type 2, uncontrolled [facility]     HgbA1c on 4/2/2019 was 9.1.     Diabetic peripheral neuropathy ([facility])     Diabetic polyneuropathy [facility]     Diabetic ulcer of right foot associated with type 2 diabetes mellitus ([facility] 12/10/2015    Essential hypertension     \"never been on b/p medication that I know of\"    GERD (gastroesophageal reflux disease)     Hammer toe of left foot     Heart murmur     denies any chest pain or palpitations    History of tobacco abuse     Hx of AKA (above knee amputation), right ([facility] 04/18/2019    Dr. Al Sosa edema, diabetic, bilateral ([facility] 05/04/2018    Dr. Yusuf Gore referred to retina specialist for 2nd opinion    Marijuana abuse in remission     Melena     MRSA (methicillin resistant staph aureus) culture positive     Onychomycosis     Other disorders of kidney and ureter in diseases classified elsewhere     Sleep apnea     no cpap    Thyroid disease     WD-Skin ulcer of fourth toe of right foot with necrosis of bone ([facility] 6/29/2016       Past Surgical History:        Procedure Laterality Date    ABSCESS DRAINAGE Right     foot    AMPUTATION ABOVE KNEE Right 4/18/2019    RIGHT ABOVE KNEE AMPUTATION performed by Jahaira Valle MD at [address] [place] [address], LAPAROSCOPIC N/A 4/1/2020    ROBOTIC CHOLECYSTECTOMY performed by Markie Ovalle MD at [address] 7/20/2020    CYSTOSCOPY performed by Norma Rowan MD at [address] [facility], DIAGNOSTIC      FOOT DEBRIDEMENT Right 07/01/2016    I & D    INCISION AND DRAINAGE Right 2/18/2019    I&D RIGHT STUMP performed by Jahaira Valle MD at [facility] Right 07/20/2016    LEG AMPUTATION BELOW KNEE Right 4/4/2019    I&D AND REVISION OF AMPUTATION RIGHT LEG performed by Jahaira Valle MD at [place] [address] Right 1/14/15    sole of foot I&D    [place] DRAIN INFECT SHOULDER BURSA Left 8/18/2017    LEFT SHOULDER INCISION AND DRAINAGE performed by Jahaira Valle MD at [address] OFFICE/OUTPT [facility] Right 9/20/2018    EXCISIONAL DEBRIDEMENT RIGHT BKA STUMP performed by Blake Collins MD at [facility] OR    TOE AMPUTATION Right 1/16/15    2nd toe with wound vac applied    UPPER GASTROINTESTINAL ENDOSCOPY N/A 3/3/2020    EGD DILATION SAVORY performed by Mikie Boyle MD at [address] [place] [address]  ? when       Social History:    Social History     Tobacco Use    Smoking status: Current Every Day Smoker     Packs/day: 1.00     Years: 10.00     Pack years: 10.00     Types: Cigars     Start date: 1985     Last attempt to quit: 2000     Years since quittin.0    Smokeless tobacco: Never Used    Tobacco comment: Use to smoke now I don't   Substance Use Topics    Alcohol use: Not Currently     Alcohol/week: 0.0 standard drinks                                Ready to quit: Not Answered  Counseling given: Not Answered  Comment: Use to smoke now I don't      Vital Signs (Current):   Vitals:    20 1450 20 1159   BP:  136/84   Pulse:  102   Resp:  18   Temp:  97.6 °F (36.4 °C)   TempSrc:  Temporal   SpO2:  100%   Weight: 212 lb (96.2 kg) 212 lb (96.2 kg)   Height: 5' 6\" (1.676 m) 5' 6\" (1.676 m)                                              BP Readings from Last 3 Encounters:   20 136/84   08/10/20 (!) 108/54   20 (!) 147/57       NPO Status: Time of last liquid consumption:                         Time of last solid consumption:                         Date of last liquid consumption: 20                        Date of last solid food consumption: 20    BMI:   Wt Readings from Last 3 Encounters:   20 212 lb (96.2 kg)   08/10/20 212 lb (96.2 kg)   20 222 lb (100.7 kg)     Body mass index is 34.22 kg/m².     CBC:   Lab Results   Component Value Date    WBC 10.7 2020    RBC 5.04 2020    HGB 15.2 2020    HCT 45.7 2020    MCV 90.7 2020    RDW 14.8 2020     2020       CMP:   Lab Results   Component Value Date     2020    K 4.8 2020    K 4.1 2020    CL 98 2020 CO2 21 08/14/2020    BUN 13 08/14/2020    CREATININE 0.7 08/14/2020    GFRAA >60 08/24/2016    LABGLOM >90 08/14/2020    GLUCOSE 238 08/14/2020    GLUCOSE 318 03/17/2020    PROT 8.1 07/31/2020    CALCIUM 9.6 08/14/2020    BILITOT 0.3 07/31/2020    ALKPHOS 64 07/31/2020    AST 36 07/31/2020    ALT 24 07/31/2020       POC Tests:   Recent Labs     08/21/20  1222   POCGLU 247*       Coags:   Lab Results   Component Value Date    PROTIME 12.2 01/03/2019    INR 0.97 11/06/2019    APTT 36.8 03/17/2020       HCG (If Applicable): No results found for: PREGTESTUR, PREGSERUM, HCG, HCGQUANT     ABGs: No results found for: PHART, PO2ART, ILK1ZAB, RJZ8ZWC, BEART, A4HUBMRJ     Type & Screen (If Applicable):  Lab Results   Component Value Date    LABRH POS 03/02/2020       Drug/Infectious Status (If Applicable):  Lab Results   Component Value Date    HEPCAB Negative 10/10/2018       COVID-19 Screening (If Applicable):   Lab Results   Component Value Date    COVID19 Not Detected 08/14/2020         Anesthesia Evaluation    Airway: Mallampati: II  TM distance: >3 FB   Neck ROM: full  Mouth opening: > = 3 FB Dental:          Pulmonary:   (+) COPD:  sleep apnea:  decreased breath sounds,  current smoker                           Cardiovascular:    (+) hypertension:,         Rhythm: regular                      Neuro/Psych:   (+) psychiatric history:            GI/Hepatic/Renal:   (+) GERD:, morbid obesity          Endo/Other:    (+) Diabetes, hypothyroidism::., .                 Abdominal:   (+) obese,         Vascular:                                        Anesthesia Plan      general     ASA 4       Induction: intravenous. MIPS: Postoperative opioids intended and Prophylactic antiemetics administered. Anesthetic plan and risks discussed with patient.                       Otf Maldonado MD   8/21/2020

## 2020-08-21 NOTE — OP NOTE
FACILITY:  [address] [place] [address] [facility], [address] [facility]  1970  [id number]    DATE: 8/21/20   SURGEON: Dr. Kaelyn Camejo M.D.  Ashley Crews: none  PREOPERATIVE DIAGNOSIS:  BPH with obstruction   POSTOPERATIVE DIAGNOSIS: same  OPERATION:  1. Cystoscopy Urolift  ANESTHESIA:  MAC.   COMPLICATIONS:  None. ESTIMATED BLOOD LOSS:  Minimal.  FLUIDS:  Crystalloid. DRAINS:  none  Implants: 4 x urolift clips. SPECIMENS:  None. INDICATIONS FOR THE PROCEDURE:  Abdulaziz Patel is a 44 y.o. male  with a history of BPH with obstruction. After treatment options were discussed including risks, benefits, alternatives, goals and possible complications,  the patient elected to proceed with today's procedure. NARRATIVE SUMMARY OF THE PROCEDURE:  The patient was moved back to the OR and placed in lithotomy position. He was induced under MAC anesthesia after a time out was taken per protocol with everyone in agreement. The patient had a urolift scope passed into the urethra and into the bladder with ease. The prostate was evaluated and noted the bladder neck and veru montanum, which were our landmarks through out the case. We started at the bladder neck and moved 1.5 cm distal to ensure proper placement of the proximal clips bilaterally. We then placed clips in the distal gland, ensuring not to travel distal to the veru. We ensured the anterior channel was widely patent and saw the bulk of the prostate obstruction be resolved. We placed a total of:  4 urolift clips in proper position. There was no capsular pull through. There was no clip migration. We visualized the bladder neck and did not note any rogue clips in the bladder. At this time hemostasis was achieved. We filled the bladder and terminated the case. The patient tolerated well and was moved to PACU in good position. Plan  Urinate in PACU  Follow up as outpatient.

## 2020-08-21 NOTE — PROGRESS NOTES
The patient return from recovery room. Awake. Taken to the bathroom x 2 assists. Patient had BM. Back to room. Alliance given. Dr Rhiannon Lozano in room to speak with the patient. Call light in reach.

## 2020-08-22 ENCOUNTER — APPOINTMENT (OUTPATIENT)
Dept: CT IMAGING | Age: 50
End: 2020-08-22
Payer: MEDICARE

## 2020-08-22 ENCOUNTER — HOSPITAL ENCOUNTER (EMERGENCY)
Age: 50
Discharge: HOME | End: 2020-08-22
Payer: MEDICARE

## 2020-08-22 VITALS
OXYGEN SATURATION: 100 % | SYSTOLIC BLOOD PRESSURE: 118 MMHG | WEIGHT: 212 LBS | BODY MASS INDEX: 34.07 KG/M2 | HEART RATE: 109 BPM | DIASTOLIC BLOOD PRESSURE: 96 MMHG | RESPIRATION RATE: 18 BRPM | TEMPERATURE: 98 F | HEIGHT: 66 IN

## 2020-08-22 LAB
ALBUMIN SERPL-MCNC: 3.9 G/DL (ref 3.5–5.1)
ALP BLD-CCNC: 69 U/L (ref 38–126)
ALT SERPL-CCNC: 32 U/L (ref 11–66)
AMPHETAMINE+METHAMPHETAMINE URINE SCREEN: NEGATIVE
ANION GAP SERPL CALCULATED.3IONS-SCNC: 11 MEQ/L (ref 8–16)
APTT: 30.4 SECONDS (ref 22–38)
AST SERPL-CCNC: 51 U/L (ref 5–40)
BACTERIA: ABNORMAL /HPF
BARBITURATE QUANTITATIVE URINE: NEGATIVE
BASOPHILS # BLD: 0.5 %
BASOPHILS ABSOLUTE: 0.1 THOU/MM3 (ref 0–0.1)
BENZODIAZEPINE QUANTITATIVE URINE: NEGATIVE
BILIRUB SERPL-MCNC: 0.6 MG/DL (ref 0.3–1.2)
BILIRUBIN DIRECT: < 0.2 MG/DL (ref 0–0.3)
BILIRUBIN URINE: NEGATIVE
BLOOD, URINE: ABNORMAL
BUN BLDV-MCNC: 10 MG/DL (ref 7–22)
CALCIUM SERPL-MCNC: 9.2 MG/DL (ref 8.5–10.5)
CANNABINOID QUANTITATIVE URINE: NEGATIVE
CASTS 2: ABNORMAL /LPF
CASTS UA: ABNORMAL /LPF
CHARACTER, URINE: ABNORMAL
CHLORIDE BLD-SCNC: 97 MEQ/L (ref 98–111)
CO2: 25 MEQ/L (ref 23–33)
COCAINE METABOLITE QUANTITATIVE URINE: NEGATIVE
COLOR: ABNORMAL
CREAT SERPL-MCNC: 0.7 MG/DL (ref 0.4–1.2)
CRYSTALS, UA: ABNORMAL
EKG ATRIAL RATE: 108 BPM
EKG P AXIS: 23 DEGREES
EKG P-R INTERVAL: 166 MS
EKG Q-T INTERVAL: 346 MS
EKG QRS DURATION: 82 MS
EKG QTC CALCULATION (BAZETT): 463 MS
EKG R AXIS: 10 DEGREES
EKG T AXIS: 41 DEGREES
EKG VENTRICULAR RATE: 108 BPM
EOSINOPHIL # BLD: 2.3 %
EOSINOPHILS ABSOLUTE: 0.3 THOU/MM3 (ref 0–0.4)
EPITHELIAL CELLS, UA: ABNORMAL /HPF
ERYTHROCYTE [DISTWIDTH] IN BLOOD BY AUTOMATED COUNT: 14.3 % (ref 11.5–14.5)
ERYTHROCYTE [DISTWIDTH] IN BLOOD BY AUTOMATED COUNT: 47.8 FL (ref 35–45)
GFR SERPL CREATININE-BSD FRML MDRD: > 90 ML/MIN/1.73M2
GLUCOSE BLD-MCNC: 313 MG/DL (ref 70–108)
GLUCOSE URINE: >= 1000 MG/DL
HCT VFR BLD CALC: 42.9 % (ref 42–52)
HEMOGLOBIN: 14.8 GM/DL (ref 14–18)
IMMATURE GRANS (ABS): 0.06 THOU/MM3 (ref 0–0.07)
IMMATURE GRANULOCYTES: 0.5 %
INR BLD: 1.03 (ref 0.85–1.13)
KETONES, URINE: NEGATIVE
LACTIC ACID, SEPSIS: 2.9 MMOL/L (ref 0.5–1.9)
LACTIC ACID, SEPSIS: 3.7 MMOL/L (ref 0.5–1.9)
LEUKOCYTE ESTERASE, URINE: ABNORMAL
LIPASE: 58.2 U/L (ref 5.6–51.3)
LYMPHOCYTES # BLD: 16.4 %
LYMPHOCYTES ABSOLUTE: 2 THOU/MM3 (ref 1–4.8)
MCH RBC QN AUTO: 31.4 PG (ref 26–33)
MCHC RBC AUTO-ENTMCNC: 34.5 GM/DL (ref 32.2–35.5)
MCV RBC AUTO: 90.9 FL (ref 80–94)
MISCELLANEOUS 2: ABNORMAL
MONOCYTES # BLD: 6.4 %
MONOCYTES ABSOLUTE: 0.8 THOU/MM3 (ref 0.4–1.3)
NITRITE, URINE: NEGATIVE
NUCLEATED RED BLOOD CELLS: 0 /100 WBC
OPIATES, URINE: NEGATIVE
OSMOLALITY CALCULATION: 277.3 MOSMOL/KG (ref 275–300)
OXYCODONE: POSITIVE
PH UA: 5.5 (ref 5–9)
PHENCYCLIDINE QUANTITATIVE URINE: NEGATIVE
PLATELET # BLD: 250 THOU/MM3 (ref 130–400)
PMV BLD AUTO: 9.6 FL (ref 9.4–12.4)
POTASSIUM SERPL-SCNC: 4.2 MEQ/L (ref 3.5–5.2)
PROTEIN UA: 100
RBC # BLD: 4.72 MILL/MM3 (ref 4.7–6.1)
RBC URINE: > 200 /HPF
RENAL EPITHELIAL, UA: ABNORMAL
SEG NEUTROPHILS: 73.9 %
SEGMENTED NEUTROPHILS ABSOLUTE COUNT: 9 THOU/MM3 (ref 1.8–7.7)
SODIUM BLD-SCNC: 133 MEQ/L (ref 135–145)
SPECIFIC GRAVITY, URINE: 1.03 (ref 1–1.03)
TOTAL PROTEIN: 7.1 G/DL (ref 6.1–8)
UROBILINOGEN, URINE: 1 EU/DL (ref 0–1)
WBC # BLD: 12.2 THOU/MM3 (ref 4.8–10.8)
WBC UA: ABNORMAL /HPF
YEAST: ABNORMAL

## 2020-08-22 PROCEDURE — 85025 COMPLETE CBC W/AUTO DIFF WBC: CPT

## 2020-08-22 PROCEDURE — 6370000000 HC RX 637 (ALT 250 FOR IP): Performed by: NURSE PRACTITIONER

## 2020-08-22 PROCEDURE — 96374 THER/PROPH/DIAG INJ IV PUSH: CPT

## 2020-08-22 PROCEDURE — 87086 URINE CULTURE/COLONY COUNT: CPT

## 2020-08-22 PROCEDURE — 6360000002 HC RX W HCPCS: Performed by: PHYSICIAN ASSISTANT

## 2020-08-22 PROCEDURE — 85730 THROMBOPLASTIN TIME PARTIAL: CPT

## 2020-08-22 PROCEDURE — 96375 TX/PRO/DX INJ NEW DRUG ADDON: CPT

## 2020-08-22 PROCEDURE — 96376 TX/PRO/DX INJ SAME DRUG ADON: CPT

## 2020-08-22 PROCEDURE — 93005 ELECTROCARDIOGRAM TRACING: CPT | Performed by: PHYSICIAN ASSISTANT

## 2020-08-22 PROCEDURE — 85610 PROTHROMBIN TIME: CPT

## 2020-08-22 PROCEDURE — 81001 URINALYSIS AUTO W/SCOPE: CPT

## 2020-08-22 PROCEDURE — 36415 COLL VENOUS BLD VENIPUNCTURE: CPT

## 2020-08-22 PROCEDURE — 2580000003 HC RX 258: Performed by: PHYSICIAN ASSISTANT

## 2020-08-22 PROCEDURE — 80307 DRUG TEST PRSMV CHEM ANLYZR: CPT

## 2020-08-22 PROCEDURE — 99285 EMERGENCY DEPT VISIT HI MDM: CPT

## 2020-08-22 PROCEDURE — 6360000002 HC RX W HCPCS

## 2020-08-22 PROCEDURE — 6360000004 HC RX CONTRAST MEDICATION: Performed by: NURSE PRACTITIONER

## 2020-08-22 PROCEDURE — 80053 COMPREHEN METABOLIC PANEL: CPT

## 2020-08-22 PROCEDURE — 82248 BILIRUBIN DIRECT: CPT

## 2020-08-22 PROCEDURE — 83605 ASSAY OF LACTIC ACID: CPT

## 2020-08-22 PROCEDURE — 87040 BLOOD CULTURE FOR BACTERIA: CPT

## 2020-08-22 PROCEDURE — 83690 ASSAY OF LIPASE: CPT

## 2020-08-22 PROCEDURE — 74177 CT ABD & PELVIS W/CONTRAST: CPT

## 2020-08-22 RX ORDER — OXYBUTYNIN CHLORIDE 5 MG/1
5 TABLET ORAL ONCE
Status: COMPLETED | OUTPATIENT
Start: 2020-08-22 | End: 2020-08-22

## 2020-08-22 RX ORDER — MORPHINE SULFATE 2 MG/ML
2 INJECTION, SOLUTION INTRAMUSCULAR; INTRAVENOUS ONCE
Status: COMPLETED | OUTPATIENT
Start: 2020-08-22 | End: 2020-08-22

## 2020-08-22 RX ORDER — OXYBUTYNIN CHLORIDE 5 MG/1
5 TABLET ORAL 3 TIMES DAILY
Qty: 20 TABLET | Refills: 0 | Status: SHIPPED | OUTPATIENT
Start: 2020-08-22 | End: 2021-01-22 | Stop reason: ALTCHOICE

## 2020-08-22 RX ORDER — SODIUM CHLORIDE 9 MG/ML
INJECTION, SOLUTION INTRAVENOUS CONTINUOUS
Status: DISCONTINUED | OUTPATIENT
Start: 2020-08-22 | End: 2020-08-22 | Stop reason: HOSPADM

## 2020-08-22 RX ORDER — MORPHINE SULFATE 2 MG/ML
INJECTION, SOLUTION INTRAMUSCULAR; INTRAVENOUS
Status: COMPLETED
Start: 2020-08-22 | End: 2020-08-22

## 2020-08-22 RX ORDER — TRAMADOL HYDROCHLORIDE 50 MG/1
50 TABLET ORAL ONCE
Status: COMPLETED | OUTPATIENT
Start: 2020-08-22 | End: 2020-08-22

## 2020-08-22 RX ORDER — TRAMADOL HYDROCHLORIDE 50 MG/1
50 TABLET ORAL EVERY 8 HOURS PRN
Qty: 8 TABLET | Refills: 0 | Status: SHIPPED | OUTPATIENT
Start: 2020-08-22 | End: 2020-08-25

## 2020-08-22 RX ORDER — ONDANSETRON 2 MG/ML
4 INJECTION INTRAMUSCULAR; INTRAVENOUS ONCE
Status: COMPLETED | OUTPATIENT
Start: 2020-08-22 | End: 2020-08-22

## 2020-08-22 RX ADMIN — TRAMADOL HYDROCHLORIDE 50 MG: 50 TABLET, FILM COATED ORAL at 18:01

## 2020-08-22 RX ADMIN — IOPAMIDOL 80 ML: 755 INJECTION, SOLUTION INTRAVENOUS at 16:36

## 2020-08-22 RX ADMIN — MORPHINE SULFATE 2 MG: 2 INJECTION, SOLUTION INTRAMUSCULAR; INTRAVENOUS at 16:00

## 2020-08-22 RX ADMIN — SODIUM CHLORIDE: 9 INJECTION, SOLUTION INTRAVENOUS at 15:26

## 2020-08-22 RX ADMIN — MORPHINE SULFATE 2 MG: 2 INJECTION, SOLUTION INTRAMUSCULAR; INTRAVENOUS at 15:26

## 2020-08-22 RX ADMIN — OXYBUTYNIN CHLORIDE 5 MG: 5 TABLET ORAL at 18:29

## 2020-08-22 RX ADMIN — ONDANSETRON 4 MG: 2 INJECTION INTRAMUSCULAR; INTRAVENOUS at 15:25

## 2020-08-22 ASSESSMENT — PAIN DESCRIPTION - PAIN TYPE: TYPE: ACUTE PAIN

## 2020-08-22 ASSESSMENT — PAIN SCALES - GENERAL
PAINLEVEL_OUTOF10: 10
PAINLEVEL_OUTOF10: 10
PAINLEVEL_OUTOF10: 7

## 2020-08-22 NOTE — ED TRIAGE NOTES
Patient to ER due to having blood in urine. Patient had prostate surgery yesterday. Patient also mentions having rectal bleeding.

## 2020-08-23 ASSESSMENT — ENCOUNTER SYMPTOMS
ABDOMINAL PAIN: 1
SHORTNESS OF BREATH: 0
COLOR CHANGE: 0
NAUSEA: 0
COUGH: 0
BLOOD IN STOOL: 0
SORE THROAT: 0
VOMITING: 0
DIARRHEA: 0
BACK PAIN: 0
RHINORRHEA: 0
CONSTIPATION: 1

## 2020-08-23 NOTE — ED PROVIDER NOTES
[facility] EMERGENCY DEPT      CHIEF COMPLAINT       Chief Complaint   Patient presents with    Hematuria    Post-op Problem    Rectal Bleeding       Nurses Notes reviewed and I agree except asnoted in the HPI. HISTORY OFPRESENT ILLNESS    Marilou Cantu is a 44 y.o. male who presents to the emergency department for evaluation of hematuria and lower abdominal pain. The patient reports that he had prostate surgery performed yesterday by Dr. Willa Ulrich (Urology). The patient states that he was not discharged home with any pain medications and has had severe pain in his lower abdomen ever since. The patient reports a sensation of urinary urgency but reports decreased urination. He has a Mitchell catheter in place and states that he has had grossly bloody urine in the catheter bag and tubing. The patient reports feeling chilled and having subjective fevers but denies any documented fever. The patient denies any chest pain or shortness of breath. The patient reports nausea but denies any vomiting, or diarrhea. The patient does report having constipation since his surgery yesterday. The patient denies any blood in his stool. The patient denies any URI symptoms. The patient has a history of diabetes, hypertension, GERD, COPD, and a right lower extremity BKA. There are no additional complaints at this time. REVIEW OF SYSTEMS      Review of Systems   Constitutional: Positive for chills and fever (Subjective). Negative for fatigue. HENT: Negative for congestion, ear pain, rhinorrhea and sore throat. Respiratory: Negative for cough and shortness of breath. Cardiovascular: Negative for chest pain. Gastrointestinal: Positive for abdominal pain (Lower) and constipation. Negative for blood in stool, diarrhea, nausea and vomiting. Genitourinary: Positive for decreased urine volume, difficulty urinating, hematuria and urgency. Negative for dysuria, flank pain and frequency.    Musculoskeletal: Negative for arthralgias, back pain, myalgias and neck pain. Skin: Negative for color change and pallor. Neurological: Negative for dizziness, weakness and light-headedness. Hematological: Does not bruise/bleed easily. Psychiatric/Behavioral: Negative for agitation. PAST MEDICAL HISTORY    has a past medical history of Bipolar 2 disorder ([address] BPH (benign prostatic hyperplasia), Diabetes mellitus type 2, uncontrolled ([address] Diabetic peripheral neuropathy ([address] Diabetic polyneuropathy ([address] Diabetic ulcer of right foot associated with type 2 diabetes mellitus ([address] Essential hypertension, GERD (gastroesophageal reflux disease), Hammer toe of left foot, Heart murmur, History of tobacco abuse, Hx of AKA (above knee amputation), right ([address] Macular edema, diabetic, bilateral ([address] Marijuana abuse in remission, Melena, MRSA (methicillin resistant staph aureus) culture positive, Onychomycosis, Other disorders of kidney and ureter in diseases classified elsewhere, Sleep apnea, Thyroid disease, and 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 SURGICAL HISTORY      has a past surgical history that includes other surgical history (Right, 1/14/15); Abscess Drainage (Right); Toe amputation (Right, 1/16/15); Foot Debridement (Right, 07/01/2016); Leg amputation below knee (Right, 07/20/2016); [place] tooth extraction (?when); pr drain infect shoulder bursa (Left, 8/18/2017); pr office/outpt visit,procedure only (Right, 9/20/2018); incision and drainage (Right, 2/18/2019); Leg amputation below knee (Right, 4/4/2019); AMPUTATION ABOVE KNEE (Right, 4/18/2019); Upper gastrointestinal endoscopy (N/A, 3/3/2020); Cholecystectomy, laparoscopic (N/A, 4/1/2020); Endoscopy, colon, diagnostic; and Cystoscopy (N/A, 7/20/2020).     CURRENT MEDICATIONS       Discharge Medication List as of 8/22/2020  5:40 PM      CONTINUE these medications which have NOT CHANGED    Details   sulfamethoxazole-trimethoprim (BACTRIM DS) 800-160 MG per tablet Take 1 tablet by mouth 2 times daily for 5 days, Disp-10 tablet,R-0Normal      Dulaglutide 0.75 MG/0.5ML SOPN Inject 0.75 mg into the skin once a week, Disp-4 pen,R-3Normal      ondansetron (ZOFRAN ODT) 4 MG disintegrating tablet Take 1 tablet by mouth every 8 hours as needed for Nausea or Vomiting, Disp-20 tablet,R-0Print      sertraline (ZOLOFT) 50 MG tablet Take 1 tablet by mouth daily, Disp-30 tablet,R-5Normal      gabapentin (NEURONTIN) 300 MG capsule Take 1 capsule by mouth 3 times daily for 60 days. , Disp-90 capsule,R-1Normal      tamsulosin (FLOMAX) 0.4 MG capsule Take 1 capsule by mouth daily Take one capsule daily to facilitate passage of ureteral stone, Disp-30 capsule,R-6Normal      levothyroxine (SYNTHROID) 50 MCG tablet Take 1 tablet by mouth daily, Disp-30 tablet, R-5Normal      insulin aspart (NOVOLOG FLEXPEN) 100 UNIT/ML injection pen Inject 25 Units into the skin 3 times daily (before meals), Disp-22.5 mL, R-5Normal      blood glucose monitor kit and supplies Accu Chek Sheila meter.  Use to test blood sugars DX:E11.65, Disp-1 kit, R-0, Normal      blood glucose monitor strips Accucheck Sheila Test Strips Test blood sugars 4 times daily DX:E11.65, Disp-400 strip, R-5, Normal      Lancets MISC Disp-400 each, R-5, NormalUse to test blood sugars 4 times daily DX:E11.65      metoprolol tartrate (LOPRESSOR) 25 MG tablet Take 1 tablet by mouth 2 times daily, Disp-60 tablet, R-0Print      lisinopril (PRINIVIL;ZESTRIL) 5 MG tablet Take 1 tablet by mouth daily, Disp-30 tablet, R-0Normal      insulin detemir (LEVEMIR FLEXTOUCH) 100 UNIT/ML injection pen Inject 85 Units into the skin 2 times daily, Disp-30 pen, R-5Normal      AMINO ACIDS COMPLEX PO Take 1 each by mouth daily Daily AA drinkHistorical Med      Insulin Syringe-Needle U-100 29G X 1/2\" 0.3 ML MISC 4 TIMES DAILY AFTER MEALS AND BEFORE BEDTIME Starting Tue 4/30/2019, Disp-200 each, R-0, Normal             ALLERGIES is allergic to pcn [penicillins]; actos [pioglitazone]; clindamycin/lincomycin; and vancomycin. FAMILY HISTORY     He indicated that his mother is . He reported the following about his father: unknown. He indicated that the status of his maternal grandmother is unknown. He indicated that the status of his maternal grandfather is unknown. @[email or web address]@    SOCIAL HISTORY      reports that he has been smoking cigars. He started smoking about 35 years ago. He has a 10.00 pack-year smoking history. He has never used smokeless tobacco. He reports previous alcohol use. He reports that he does not use drugs. PHYSICAL EXAM     INITIAL VITALS:  height is 5' 6\" (1.676 m) and weight is 212 lb (96.2 kg). His oral temperature is 98 °F (36.7 °C). His blood pressure is 118/96 (abnormal) and his pulse is 109. His respiration is 18 and oxygen saturation is 100%. Physical Exam  Vitals signs and nursing note reviewed. Constitutional:       General: He is not in acute distress. Appearance: Normal appearance. He is well-developed. He is not ill-appearing, toxic-appearing or diaphoretic. HENT:      Head: Normocephalic and atraumatic. Right Ear: External ear normal.      Left Ear: External ear normal.      Nose: Nose normal.      Mouth/Throat:      Mouth: Mucous membranes are moist.      Pharynx: Oropharynx is clear. Eyes:      General: No scleral icterus. Right eye: No discharge. Left eye: No discharge. Conjunctiva/sclera: Conjunctivae normal.      Pupils: Pupils are equal, round, and reactive to light. Neck:      Musculoskeletal: Normal range of motion. Cardiovascular:      Rate and Rhythm: Normal rate and regular rhythm. Heart sounds: Normal heart sounds. No murmur. No friction rub. No gallop. Pulmonary:      Effort: Pulmonary effort is normal. No respiratory distress. Breath sounds: Normal breath sounds. No stridor. No wheezing, rhonchi or rales.    Chest:      Chest Sinus tachycardia   Possible Left atrial enlargement   Borderline ECG   When compared with ECG of 31-JUL-2020 02:17,   No significant change was found               RADIOLOGY: non-plain film images(s) such as CT, Ultrasound and MRI are read by the radiologist.  Plainradiographic images are visualized and preliminarily interpreted by the emergency physician unless otherwise stated below. CT ABDOMEN PELVIS W IV CONTRAST Additional Contrast? None   Final Result      No acute intra-abdominal or intrapelvic findings.       Final report electronically signed by Dr. Ezequiel Jackson on 8/22/2020 4:49 PM          LABS:   Labs Reviewed   CBC WITH AUTO DIFFERENTIAL - Abnormal; Notable for the following components:       Result Value    WBC 12.2 (*)     RDW-SD 47.8 (*)     Segs Absolute 9.0 (*)     All other components within normal limits   BASIC METABOLIC PANEL - Abnormal; Notable for the following components:    Sodium 133 (*)     Chloride 97 (*)     Glucose 313 (*)     All other components within normal limits   HEPATIC FUNCTION PANEL - Abnormal; Notable for the following components:    AST 51 (*)     All other components within normal limits   LIPASE - Abnormal; Notable for the following components:    Lipase 58.2 (*)     All other components within normal limits   LACTATE, SEPSIS - Abnormal; Notable for the following components:    Lactic Acid, Sepsis 3.7 (*)     All other components within normal limits   LACTATE, SEPSIS - Abnormal; Notable for the following components:    Lactic Acid, Sepsis 2.9 (*)     All other components within normal limits   URINE WITH REFLEXED MICRO - Abnormal; Notable for the following components:    Glucose, Ur >= 1000 (*)     Blood, Urine LARGE (*)     Protein,  (*)     Leukocyte Esterase, Urine TRACE (*)     Color, UA ORANGE (*)     Character, Urine CLOUDY (*)     All other components within normal limits   CULTURE, BLOOD 1    Narrative:     Source: blood-Adult-suboptimal <5.5oz./set volume       Site: (single bottle)Peripheral Vein            Current Antibiotics: not stated   CULTURE, BLOOD 2    Narrative:     Source: blood-Adult-suboptimal <5.5oz./set volume       Site: Line:          Current   Antibiotics: not stated   CULTURE, REFLEXED, URINE   APTT   PROTIME-INR   URINE DRUG SCREEN   ANION GAP   GLOMERULAR FILTRATION RATE, ESTIMATED   OSMOLALITY       EMERGENCY DEPARTMENT COURSE:   Vitals:    Vitals:    08/22/20 1412 08/22/20 1516 08/22/20 1550   BP: (!) 216/83 126/72 (!) 118/96   Pulse: 112 110 109   Resp: 20 20 18   Temp: 98 °F (36.7 °C)     TempSrc: Oral     SpO2: 98% 98% 100%   Weight: 212 lb (96.2 kg)     Height: 5' 6\" (1.676 m)       The patient was seen and evaluated within the ED today with lower abdominal pain and bloody urine following a prostatic surgery performed yesterday. Within the department, I observed the patient's blood pressure on arrival to this department was 216/83, which was felt to be secondary to his history of hypertension and pain. The patient is tachycardic with a heart rate of 112 bpm on arrival to this department. There is no tachypnea. The patient is afebrile, and SPO2 is 98% on room air. On exam, I appreciated clear lung sounds. There is no chest wall tenderness. There is mild diffuse lower abdominal tenderness without guarding, rigidity, or rebound tenderness. There is grossly bloody urine in the patient's catheter bag and tubing. Radiologic studies and laboratory work were ordered and are pending at the time of shift change. Within the department, the patient was treated with morphine and Zofran. I observed the patient's condition to remain stable during the duration of the stay. At shift change, Alec Grimes, ALEJANDRO, took over the patient's care. He will review results, order additional tests and labs, treat, consult, and admit or discharge as appropriate.     CRITICAL CARE:   None    CONSULTS:  None    PROCEDURES:  None    FINAL

## 2020-08-23 NOTE — ED PROVIDER NOTES
[facility]  eMERGENCY dEPARTMENT eNCOUnter     Pt Name: Karla Arredondo  MRN: [id number]  [place] 1970  Date of evaluation: 8/22/20        Mid-level provider Note:    I have personally performed and/or participated in the history, exam and medical decision making and agree with all pertinent clinical information as noted by the previous provider. I have also reviewed and agree with the past medical, family and social history unless otherwise noted. I have personally performed a face to face diagnostic evaluation on this patient. I have reviewed the previous provider's findings and agree. Evaluation: Patient was signed out to me via Ina Summers PA-C, we are awaiting further work-up to be completed. Reviewed work-up, noted slight leukocytosis, slight lactic Acid elevation which appears to be chronic. Urine shows no conclusive signs of infection, significant red blood cells noted. CT of the abdomen and pelvis reveals no significant abnormality. Discussed the case with [facility] CNP of the urology service. She suggest oxybutynin. Discussed the findings and the plan of care the patient he is concerned about further abnormalities, he wants to stay for observation. I discussed this with urology and they declined, states that there is no need for admission right now. I discussed this with the patient he is amenable with discharge, will give him a short course of tramadol, he is advised place Vaseline on his tip of his penis if he is having irritation. He is advised to use leg strap. Advised to follow-up with Dr. Hernando King in 2 days. He verbalized understanding plan of care. 1. Bladder spasm    2. Benign prostatic hyperplasia with lower urinary tract symptoms, symptom details unspecified    3.  Benign essential microscopic hematuria          DISPOSITION/PLAN  PATIENT REFERRED TO:  Blade Doherty MD  [address] [facility] [address]    Go in 2 days  For

## 2020-08-24 ENCOUNTER — NURSE ONLY (OUTPATIENT)
Dept: UROLOGY | Age: 50
End: 2020-08-24

## 2020-08-24 VITALS — TEMPERATURE: 98.1 F

## 2020-08-24 DIAGNOSIS — N13.8 BPH WITH URINARY OBSTRUCTION: ICD-10-CM

## 2020-08-24 DIAGNOSIS — N40.1 BPH WITH URINARY OBSTRUCTION: ICD-10-CM

## 2020-08-24 LAB — URINE CULTURE REFLEX: NORMAL

## 2020-08-24 PROCEDURE — 99999 PR OFFICE/OUTPT VISIT,PROCEDURE ONLY: CPT | Performed by: UROLOGY

## 2020-08-24 NOTE — PROGRESS NOTES
CLINICAL PHARMACY NOTE: MEDS TO [address] Select Patient?: Yes  Total # of Prescriptions Filled: 1   The following medications were delivered to the patient:  Sulfamethoxazole-trimethoprim 800-160mg  Total # of Interventions Completed: 2  Time Spent (min): 30    Additional Documentation:

## 2020-08-28 LAB
BLOOD CULTURE, ROUTINE: NORMAL
BLOOD CULTURE, ROUTINE: NORMAL

## 2020-08-29 ASSESSMENT — ENCOUNTER SYMPTOMS
ABDOMINAL PAIN: 0
SHORTNESS OF BREATH: 1
VOMITING: 0
DIARRHEA: 0
SORE THROAT: 0
CONSTIPATION: 0
TROUBLE SWALLOWING: 0
NAUSEA: 0
COUGH: 0

## 2020-09-02 ENCOUNTER — APPOINTMENT (OUTPATIENT)
Dept: CT IMAGING | Age: 50
End: 2020-09-02
Payer: MEDICARE

## 2020-09-02 ENCOUNTER — TELEPHONE (OUTPATIENT)
Dept: WOUND CARE | Age: 50
End: 2020-09-02

## 2020-09-02 ENCOUNTER — APPOINTMENT (OUTPATIENT)
Dept: GENERAL RADIOLOGY | Age: 50
End: 2020-09-02
Payer: MEDICARE

## 2020-09-02 ENCOUNTER — HOSPITAL ENCOUNTER (EMERGENCY)
Age: 50
Discharge: HOME | End: 2020-09-03
Payer: MEDICARE

## 2020-09-02 LAB
ANION GAP SERPL CALCULATED.3IONS-SCNC: 13 MEQ/L (ref 8–16)
BASOPHILS # BLD: 0.6 %
BASOPHILS ABSOLUTE: 0.1 THOU/MM3 (ref 0–0.1)
BUN BLDV-MCNC: 16 MG/DL (ref 7–22)
C-REACTIVE PROTEIN: 0.85 MG/DL (ref 0–1)
CALCIUM SERPL-MCNC: 10.3 MG/DL (ref 8.5–10.5)
CHLORIDE BLD-SCNC: 91 MEQ/L (ref 98–111)
CO2: 25 MEQ/L (ref 23–33)
CREAT SERPL-MCNC: 0.8 MG/DL (ref 0.4–1.2)
EKG ATRIAL RATE: 108 BPM
EKG P AXIS: 47 DEGREES
EKG P-R INTERVAL: 168 MS
EKG Q-T INTERVAL: 328 MS
EKG QRS DURATION: 76 MS
EKG QTC CALCULATION (BAZETT): 439 MS
EKG R AXIS: 22 DEGREES
EKG T AXIS: 60 DEGREES
EKG VENTRICULAR RATE: 108 BPM
EOSINOPHIL # BLD: 3.7 %
EOSINOPHILS ABSOLUTE: 0.5 THOU/MM3 (ref 0–0.4)
ERYTHROCYTE [DISTWIDTH] IN BLOOD BY AUTOMATED COUNT: 14.3 % (ref 11.5–14.5)
ERYTHROCYTE [DISTWIDTH] IN BLOOD BY AUTOMATED COUNT: 45.5 FL (ref 35–45)
FLU A ANTIGEN: NEGATIVE
FLU B ANTIGEN: NEGATIVE
GFR SERPL CREATININE-BSD FRML MDRD: > 90 ML/MIN/1.73M2
GLUCOSE BLD-MCNC: 284 MG/DL (ref 70–108)
HCT VFR BLD CALC: 44.6 % (ref 42–52)
HEMOGLOBIN: 15.6 GM/DL (ref 14–18)
IMMATURE GRANS (ABS): 0.12 THOU/MM3 (ref 0–0.07)
IMMATURE GRANULOCYTES: 1 %
LIPASE: 127.6 U/L (ref 5.6–51.3)
LYMPHOCYTES # BLD: 28.7 %
LYMPHOCYTES ABSOLUTE: 3.6 THOU/MM3 (ref 1–4.8)
MCH RBC QN AUTO: 31.1 PG (ref 26–33)
MCHC RBC AUTO-ENTMCNC: 35 GM/DL (ref 32.2–35.5)
MCV RBC AUTO: 88.8 FL (ref 80–94)
MONOCYTES # BLD: 7.2 %
MONOCYTES ABSOLUTE: 0.9 THOU/MM3 (ref 0.4–1.3)
NUCLEATED RED BLOOD CELLS: 0 /100 WBC
OSMOLALITY CALCULATION: 270.4 MOSMOL/KG (ref 275–300)
PLATELET # BLD: 263 THOU/MM3 (ref 130–400)
PMV BLD AUTO: 9.6 FL (ref 9.4–12.4)
POTASSIUM REFLEX MAGNESIUM: 4.3 MEQ/L (ref 3.5–5.2)
PRO-BNP: 17.1 PG/ML (ref 0–900)
PROCALCITONIN: 0.1 NG/ML (ref 0.01–0.09)
RBC # BLD: 5.02 MILL/MM3 (ref 4.7–6.1)
SEG NEUTROPHILS: 58.8 %
SEGMENTED NEUTROPHILS ABSOLUTE COUNT: 7.3 THOU/MM3 (ref 1.8–7.7)
SODIUM BLD-SCNC: 129 MEQ/L (ref 135–145)
TROPONIN T: < 0.01 NG/ML
WBC # BLD: 12.4 THOU/MM3 (ref 4.8–10.8)

## 2020-09-02 PROCEDURE — 96375 TX/PRO/DX INJ NEW DRUG ADDON: CPT

## 2020-09-02 PROCEDURE — 96374 THER/PROPH/DIAG INJ IV PUSH: CPT

## 2020-09-02 PROCEDURE — 71045 X-RAY EXAM CHEST 1 VIEW: CPT

## 2020-09-02 PROCEDURE — 80048 BASIC METABOLIC PNL TOTAL CA: CPT

## 2020-09-02 PROCEDURE — 93005 ELECTROCARDIOGRAM TRACING: CPT | Performed by: EMERGENCY MEDICINE

## 2020-09-02 PROCEDURE — 83690 ASSAY OF LIPASE: CPT

## 2020-09-02 PROCEDURE — 86140 C-REACTIVE PROTEIN: CPT

## 2020-09-02 PROCEDURE — 85025 COMPLETE CBC W/AUTO DIFF WBC: CPT

## 2020-09-02 PROCEDURE — 71275 CT ANGIOGRAPHY CHEST: CPT

## 2020-09-02 PROCEDURE — 84484 ASSAY OF TROPONIN QUANT: CPT

## 2020-09-02 PROCEDURE — 36415 COLL VENOUS BLD VENIPUNCTURE: CPT

## 2020-09-02 PROCEDURE — 87804 INFLUENZA ASSAY W/OPTIC: CPT

## 2020-09-02 PROCEDURE — 83615 LACTATE (LD) (LDH) ENZYME: CPT

## 2020-09-02 PROCEDURE — 99284 EMERGENCY DEPT VISIT MOD MDM: CPT

## 2020-09-02 PROCEDURE — 2580000003 HC RX 258: Performed by: NURSE PRACTITIONER

## 2020-09-02 PROCEDURE — 84145 PROCALCITONIN (PCT): CPT

## 2020-09-02 PROCEDURE — 6360000004 HC RX CONTRAST MEDICATION: Performed by: NURSE PRACTITIONER

## 2020-09-02 PROCEDURE — 74177 CT ABD & PELVIS W/CONTRAST: CPT

## 2020-09-02 PROCEDURE — 83880 ASSAY OF NATRIURETIC PEPTIDE: CPT

## 2020-09-02 RX ORDER — ONDANSETRON 4 MG/1
4 TABLET, ORALLY DISINTEGRATING ORAL EVERY 8 HOURS PRN
Qty: 20 TABLET | Refills: 0 | Status: SHIPPED | OUTPATIENT
Start: 2020-09-02 | End: 2020-10-13 | Stop reason: SDUPTHER

## 2020-09-02 RX ORDER — 0.9 % SODIUM CHLORIDE 0.9 %
500 INTRAVENOUS SOLUTION INTRAVENOUS ONCE
Status: COMPLETED | OUTPATIENT
Start: 2020-09-02 | End: 2020-09-03

## 2020-09-02 RX ORDER — ONDANSETRON 2 MG/ML
4 INJECTION INTRAMUSCULAR; INTRAVENOUS ONCE
Status: COMPLETED | OUTPATIENT
Start: 2020-09-03 | End: 2020-09-03

## 2020-09-02 RX ORDER — KETOROLAC TROMETHAMINE 30 MG/ML
30 INJECTION, SOLUTION INTRAMUSCULAR; INTRAVENOUS ONCE
Status: COMPLETED | OUTPATIENT
Start: 2020-09-03 | End: 2020-09-03

## 2020-09-02 RX ADMIN — SODIUM CHLORIDE 500 ML: 9 INJECTION, SOLUTION INTRAVENOUS at 22:10

## 2020-09-02 RX ADMIN — IOPAMIDOL 80 ML: 755 INJECTION, SOLUTION INTRAVENOUS at 23:37

## 2020-09-02 ASSESSMENT — PAIN DESCRIPTION - LOCATION: LOCATION: ABDOMEN

## 2020-09-02 ASSESSMENT — PAIN SCALES - GENERAL: PAINLEVEL_OUTOF10: 8

## 2020-09-02 ASSESSMENT — PAIN DESCRIPTION - DESCRIPTORS: DESCRIPTORS: ACHING

## 2020-09-02 ASSESSMENT — PAIN DESCRIPTION - PAIN TYPE: TYPE: ACUTE PAIN

## 2020-09-03 ENCOUNTER — TELEPHONE (OUTPATIENT)
Dept: INTERNAL MEDICINE CLINIC | Age: 50
End: 2020-09-03

## 2020-09-03 VITALS
HEART RATE: 101 BPM | BODY MASS INDEX: 34.07 KG/M2 | DIASTOLIC BLOOD PRESSURE: 83 MMHG | TEMPERATURE: 97.8 F | OXYGEN SATURATION: 98 % | RESPIRATION RATE: 16 BRPM | SYSTOLIC BLOOD PRESSURE: 150 MMHG | HEIGHT: 66 IN | WEIGHT: 212 LBS

## 2020-09-03 LAB
LACTIC ACID: 3.9 MMOL/L (ref 0.5–2.2)
LD: 229 U/L (ref 100–190)

## 2020-09-03 PROCEDURE — 6360000002 HC RX W HCPCS: Performed by: NURSE PRACTITIONER

## 2020-09-03 PROCEDURE — 93010 ELECTROCARDIOGRAM REPORT: CPT | Performed by: INTERNAL MEDICINE

## 2020-09-03 PROCEDURE — U0003 INFECTIOUS AGENT DETECTION BY NUCLEIC ACID (DNA OR RNA); SEVERE ACUTE RESPIRATORY SYNDROME CORONAVIRUS 2 (SARS-COV-2) (CORONAVIRUS DISEASE [COVID-19]), AMPLIFIED PROBE TECHNIQUE, MAKING USE OF HIGH THROUGHPUT TECHNOLOGIES AS DESCRIBED BY CMS-2020-01-R: HCPCS

## 2020-09-03 PROCEDURE — 83605 ASSAY OF LACTIC ACID: CPT

## 2020-09-03 RX ADMIN — KETOROLAC TROMETHAMINE 30 MG: 30 INJECTION, SOLUTION INTRAMUSCULAR at 00:05

## 2020-09-03 RX ADMIN — ONDANSETRON 4 MG: 2 INJECTION INTRAMUSCULAR; INTRAVENOUS at 00:05

## 2020-09-03 NOTE — ED NOTES
Resting in bed. Medicated per MAR. siderails up x2. Will continue to monitor.       Zahra Perez RN  09/03/20 [id number]

## 2020-09-03 NOTE — TELEPHONE ENCOUNTER
Call brenda mccormick, and instruct him to not continue trulicity, lipase was elevated in ED. Continue with increased insulins as advised per the [place].

## 2020-09-03 NOTE — ED NOTES
Updated on poc. Verbalized understanding. Call light in reach. Side rails up x2. Will continue to monitor.       Mateus Fulton RN  09/02/20 [id number]

## 2020-09-03 NOTE — ED NOTES
Pt arrives with complaints of sob and emesis. Pt states he has been vomiting with eating and drinking. Pt reports he has been sob since Monday. Pt reports abd pain 8/10. breathing easy and unlabored. Call light within reach. ekg complete. Assessment complete. Will continue to monitor.       Analy Mccabe RN  09/02/20 [id number]

## 2020-09-04 ENCOUNTER — TELEPHONE (OUTPATIENT)
Dept: UROLOGY | Age: 50
End: 2020-09-04

## 2020-09-04 ENCOUNTER — CARE COORDINATION (OUTPATIENT)
Dept: CARE COORDINATION | Age: 50
End: 2020-09-04

## 2020-09-04 ENCOUNTER — PATIENT MESSAGE (OUTPATIENT)
Dept: INTERNAL MEDICINE CLINIC | Age: 50
End: 2020-09-04

## 2020-09-04 NOTE — TELEPHONE ENCOUNTER
Pt made appointment on [facility] for 9/18/2020 he already has an appointment on 9/14/2020.  LVM for the patient to call the office back regarding the appointment made on [facility]

## 2020-09-04 NOTE — TELEPHONE ENCOUNTER
From: Karla Arredondo  To: MARILYN Berger - CNP  Sent: 9/4/2020 6:10 AM EDT  Subject: Test Results Question    I have a question about LIPASE resulted on 9/2/20 at 10:45 PM. What does this mean

## 2020-09-05 LAB — SARS-COV-2: NOT DETECTED

## 2020-09-06 ASSESSMENT — ENCOUNTER SYMPTOMS
NAUSEA: 1
COUGH: 0
ABDOMINAL PAIN: 1
RHINORRHEA: 0
DIARRHEA: 1
CHEST TIGHTNESS: 0
VOMITING: 1
SHORTNESS OF BREATH: 1
BACK PAIN: 0

## 2020-09-06 NOTE — ED PROVIDER NOTES
8/18/2017    LEFT SHOULDER INCISION AND DRAINAGE performed by Judith Martinez MD at [address] OFFICE/OUTPT [address] Right 9/20/2018    EXCISIONAL DEBRIDEMENT RIGHT BKA STUMP performed by Delta Marroquin MD at [facility] Right 1/16/15    2nd toe with wound vac applied    UPPER GASTROINTESTINAL ENDOSCOPY N/A 3/3/2020    EGD DILATION SAVORY performed by Claudine Selby MD at [address] [place] [address]  ? when         MEDICATIONS   No current facility-administered medications for this encounter. Current Outpatient Medications:     ondansetron (ZOFRAN ODT) 4 MG disintegrating tablet, Take 1 tablet by mouth every 8 hours as needed for Nausea or Vomiting, Disp: 20 tablet, Rfl: 0    oxybutynin (DITROPAN) 5 MG tablet, Take 1 tablet by mouth 3 times daily, Disp: 20 tablet, Rfl: 0    Dulaglutide 0.75 MG/0.5ML SOPN, Inject 0.75 mg into the skin once a week, Disp: 4 pen, Rfl: 3    sertraline (ZOLOFT) 50 MG tablet, Take 1 tablet by mouth daily, Disp: 30 tablet, Rfl: 5    gabapentin (NEURONTIN) 300 MG capsule, Take 1 capsule by mouth 3 times daily for 60 days. , Disp: 90 capsule, Rfl: 1    tamsulosin (FLOMAX) 0.4 MG capsule, Take 1 capsule by mouth daily Take one capsule daily to facilitate passage of ureteral stone, Disp: 30 capsule, Rfl: 6    levothyroxine (SYNTHROID) 50 MCG tablet, Take 1 tablet by mouth daily, Disp: 30 tablet, Rfl: 5    insulin aspart (NOVOLOG FLEXPEN) 100 UNIT/ML injection pen, Inject 25 Units into the skin 3 times daily (before meals), Disp: 22.5 mL, Rfl: 5    blood glucose monitor kit and supplies, Accu Chek Sheila meter.  Use to test blood sugars DX:E11.65, Disp: 1 kit, Rfl: 0    blood glucose monitor strips, Accucheck Sheila Test Strips Test blood sugars 4 times daily DX:E11.65, Disp: 400 strip, Rfl: 5    Lancets MISC, Use to test blood sugars 4 times daily DX:E11.65, Disp: 400 each, Rfl: 5    metoprolol tartrate (LOPRESSOR) 25 MG tablet, Take 1 tablet by mouth 2 times daily, Disp: 60 tablet, Rfl: 0    lisinopril (PRINIVIL;ZESTRIL) 5 MG tablet, Take 1 tablet by mouth daily, Disp: 30 tablet, Rfl: 0    insulin detemir (LEVEMIR FLEXTOUCH) 100 UNIT/ML injection pen, Inject 85 Units into the skin 2 times daily (Patient taking differently: Inject 55 Units into the skin 2 times daily ), Disp: 30 pen, Rfl: 5    AMINO ACIDS COMPLEX PO, Take 1 each by mouth daily Daily AA drink, Disp: , Rfl:     Insulin Syringe-Needle U-100 29G X 1/2\" 0.3 ML MISC, 1 each by Does not apply route 4 times daily (after meals and at bedtime), Disp: 200 each, Rfl: 0      SOCIAL HISTORY     Social History     Social History Narrative    Not on file     Social History     Tobacco Use    Smoking status: Current Every Day Smoker     Packs/day: 1.00     Years: 10.00     Pack years: 10.00     Types: Cigars     Start date: 1985     Last attempt to quit: 2000     Years since quittin.0    Smokeless tobacco: Never Used   Substance Use Topics    Alcohol use: Not Currently     Alcohol/week: 0.0 standard drinks    Drug use: No     Comment: 30 YEARS AGO         ALLERGIES     Allergies   Allergen Reactions    Pcn [Penicillins] Shortness Of Breath, Nausea And Vomiting and Other (See Comments)     Does not remember reactions. Has TOLERATED amoxicillin and several different cephalosporins.  Actos [Pioglitazone] Swelling    Clindamycin/Lincomycin Itching    Vancomycin Hives      Rash, with erythematous plaques to abdomen, shoulders, and proximal upper extremities with pruritis, persisted with 2nd dose administered slower.            FAMILY HISTORY     Family History   Problem Relation Age of Onset    Diabetes Mother     Other Mother         pneumonia, H1N1    Depression Mother     Early Death Mother     High Blood Pressure Mother     High Cholesterol Mother     Vision Loss Maternal Grandmother     Arthritis Maternal Grandfather     Heart Disease Maternal Grandfather PHYSICAL EXAM     ED Triage Vitals [09/02/20 2112]   BP Temp Temp Source Pulse Resp SpO2 Height Weight   (!) 151/85 97.8 °F (36.6 °C) Oral 112 18 99 % 5' 6\" (1.676 m) 212 lb (96.2 kg)     Initial vital signs and nursing assessment reviewed and normal. Pulsoximetry is normal per my interpretation. Additional Vital Signs:  Vitals:    09/03/20 0009   BP: (!) 150/83   Pulse: 101   Resp: 16   Temp:    SpO2: 98%       Physical Exam  Vitals signs and nursing note reviewed. Constitutional:       General: He is not in acute distress. Appearance: He is well-developed. He is not diaphoretic. HENT:      Head: Normocephalic and atraumatic. Eyes:      General:         Right eye: No discharge. Left eye: No discharge. Conjunctiva/sclera: Conjunctivae normal.   Neck:      Musculoskeletal: Normal range of motion. Trachea: No tracheal deviation. Cardiovascular:      Rate and Rhythm: Normal rate and regular rhythm. Heart sounds: Normal heart sounds. No murmur. No gallop. Comments: Normal capillary refill  Pulmonary:      Effort: Pulmonary effort is normal. No respiratory distress. Breath sounds: Normal breath sounds. No stridor. Abdominal:      General: Bowel sounds are normal.      Palpations: Abdomen is soft. Tenderness: There is generalized abdominal tenderness. Musculoskeletal: Normal range of motion. General: No tenderness or deformity. Skin:     General: Skin is warm and dry. Capillary Refill: Capillary refill takes 2 to 3 seconds. Coloration: Skin is not pale. Findings: No erythema or rash. Neurological:      General: No focal deficit present. Mental Status: He is alert and oriented to person, place, and time. Cranial Nerves: No cranial nerve deficit.    Psychiatric:         Mood and Affect: Mood normal.         Behavior: Behavior normal.             MEDICAL DECISION MAKING   Differential Diagnoses: Gastroenteritis, COVID, viral illness. Initial Assessment:  The patietn was seen and evaluated in the ER for Nausea, vomiting, diarrhea, body aches and fatigue with mild SOB. On exam, he is in no acute distress. Patient is treated with IVF, zofran and toradol with improvement in symptoms. CT abd pelvis and CTA of the chest are completed. They are reassuring. The patient's lactic is minimally elevated. This is likely 2/2 the patient's vomting. He routinely has an elevated lactic. Sodium was low but his BS is 284 therefore corrected sodium is normal.  Lipase is minimally elevated as well. Likely 2/2 vomiting. Outpatient COVID swab is sent. Clinically, patient is well appearing and improved after medication. Will discharge home with follow up. Instructed to return for new or worsening symptoms. Plan: To further define disposition and risk stratification will obtain a complete lab work up with ct abd and chest.  Send out covid. [facility]         ED RESULTS   Laboratory results:  Labs Reviewed   BASIC METABOLIC PANEL W/ REFLEX TO MG FOR LOW K - Abnormal; Notable for the following components:       Result Value    Sodium 129 (*)     Chloride 91 (*)     Glucose 284 (*)     All other components within normal limits   CBC WITH AUTO DIFFERENTIAL - Abnormal; Notable for the following components:    WBC 12.4 (*)     RDW-SD 45.5 (*)     Eosinophils Absolute 0.5 (*)     Immature Grans (Abs) 0.12 (*)     All other components within normal limits   LIPASE - Abnormal; Notable for the following components:    Lipase 127.6 (*)     All other components within normal limits   LACTATE DEHYDROGENASE - Abnormal; Notable for the following components:     (*)     All other components within normal limits   LACTIC ACID, PLASMA - Abnormal; Notable for the following components:    Lactic Acid 3.9 (*)     All other components within normal limits   PROCALCITONIN - Abnormal; Notable for the following components:    Procalcitonin 0.10 (*)     All other components within normal limits   OSMOLALITY - Abnormal; Notable for the following components:    Osmolality Calc 270.4 (*)     All other components within normal limits   RAPID INFLUENZA A/B ANTIGENS   BRAIN NATRIURETIC PEPTIDE   TROPONIN   C-REACTIVE PROTEIN   ANION GAP   GLOMERULAR FILTRATION RATE, ESTIMATED   COVID-19 AMBULATORY   URINE RT REFLEX TO CULTURE   COVID-19   COVID-19       Radiologic studies results:  CTA CHEST W WO CONTRAST   Final Result       1. There is a small pericardial effusion. 2. There is suboptimal opacification of the pulmonary arterial system. However, there are no large pulmonary emboli noted in the main pulmonary outflow tract or right or left main pulmonary arteries. **This report has been created using voice recognition software. It may contain minor errors which are inherent in voice recognition technology. **      Final report electronically signed by Dr Yvette Altamirano on 9/3/2020 12:07 AM      CT ABDOMEN PELVIS W IV CONTRAST Additional Contrast? None   Final Result      1. There is no acute process in the abdomen or pelvis. 2. Hepatic steatosis. **This report has been created using voice recognition software. It may contain minor errors which are inherent in voice recognition technology. **      Final report electronically signed by Dr Yvette Altamirano on 9/3/2020 12:02 AM      XR CHEST PORTABLE   Final Result   There is no acute intrathoracic process. **This report has been created using voice recognition software. It may contain minor errors which are inherent in voice recognition technology. **      Final report electronically signed by Dr Yvette Altamirano on 9/2/2020 9:46 PM          ED Medications administered this visit:   Medications   0.9 % sodium chloride bolus (0 mLs Intravenous Stopped 9/3/20 0009)   ketorolac (TORADOL) injection 30 mg (30 mg Intravenous Given 9/3/20 0005)   ondansetron (ZOFRAN) injection 4 mg (4 mg Intravenous Given 9/3/20 0005)   iopamidol (ISOVUE-370) 76 % injection 80 mL (80 mLs Intravenous Given 9/2/20 1974)         ED COURSE                      Strict return precautions and follow up instructions were discussed with the patient prior to discharge, with which the patient agrees. Physical assessment findings, diagnostic testing(s) if applicable, and vital signs reviewed with patient/patient representative. Questions answered. Medications asdirected, including OTC medications for supportive care. Education provided on medications. Differential diagnosis(s) discussed with patient/patient representative. Home care/self care instructions reviewed withpatient/patient representative. Patient is to follow-up with family care provider in 2-3 days if no improvement. Patient is to go to the emergency department if symptoms worsen. Patient/patient representative isaware of care plan, questions answered, verbalizes understanding and is in agreement. MEDICATION CHANGES     Discharge Medication List as of 9/3/2020 12:56 AM            FINAL DISPOSITION     Final diagnoses:   Person under investigation for COVID-19   Nausea   Myalgia   Fatigue, unspecified type     DISPOSITION Decision To Discharge 09/03/2020 12:32:13 AM      King Medico, APRN - CNP  [address] [place] [address]    Schedule an appointment as soon as possible for a visit in 2 days  For follow up    DISCHARGE MEDICATIONS:  Discharge Medication List as of 9/3/2020 12:56 AM          Condition: condition: stable  Dispo: Discharge to home      This transcription was electronically signed. Parts of this transcriptions may have been dictated by use of voice recognition software and electronically transcribed, and parts may have been transcribed with the assistance of an ED scribe. The transcription may contain errors not detected in proofreading.   Please refer to my supervising physician's documentation if my documentation differs.     Electronically Signed: Oma Candelaria, 09/06/20, 7:10 PM           Oma Candelaria, MARILYN - CNP  09/06/20 1937

## 2020-09-08 ENCOUNTER — CARE COORDINATION (OUTPATIENT)
Dept: CARE COORDINATION | Age: 50
End: 2020-09-08

## 2020-09-08 ENCOUNTER — OFFICE VISIT (OUTPATIENT)
Dept: INTERNAL MEDICINE CLINIC | Age: 50
End: 2020-09-08
Payer: MEDICARE

## 2020-09-08 VITALS
OXYGEN SATURATION: 99 % | HEART RATE: 102 BPM | TEMPERATURE: 98.4 F | DIASTOLIC BLOOD PRESSURE: 60 MMHG | SYSTOLIC BLOOD PRESSURE: 130 MMHG

## 2020-09-08 PROCEDURE — 3017F COLORECTAL CA SCREEN DOC REV: CPT | Performed by: NURSE PRACTITIONER

## 2020-09-08 PROCEDURE — 4004F PT TOBACCO SCREEN RCVD TLK: CPT | Performed by: NURSE PRACTITIONER

## 2020-09-08 PROCEDURE — 3046F HEMOGLOBIN A1C LEVEL >9.0%: CPT | Performed by: NURSE PRACTITIONER

## 2020-09-08 PROCEDURE — G8427 DOCREV CUR MEDS BY ELIG CLIN: HCPCS | Performed by: NURSE PRACTITIONER

## 2020-09-08 PROCEDURE — 2022F DILAT RTA XM EVC RTNOPTHY: CPT | Performed by: NURSE PRACTITIONER

## 2020-09-08 PROCEDURE — 1111F DSCHRG MED/CURRENT MED MERGE: CPT | Performed by: NURSE PRACTITIONER

## 2020-09-08 PROCEDURE — G8417 CALC BMI ABV UP PARAM F/U: HCPCS | Performed by: NURSE PRACTITIONER

## 2020-09-08 PROCEDURE — 99214 OFFICE O/P EST MOD 30 MIN: CPT | Performed by: NURSE PRACTITIONER

## 2020-09-08 RX ORDER — PANTOPRAZOLE SODIUM 20 MG/1
20 TABLET, DELAYED RELEASE ORAL
Qty: 30 TABLET | Refills: 3 | Status: SHIPPED
Start: 2020-09-08 | End: 2020-10-07 | Stop reason: DRUGHIGH

## 2020-09-08 NOTE — PROGRESS NOTES
Post-Discharge Transitional Care Management Services or Hospital Follow Up      Chadwick Gutierrez   YOB: 1970    Date of Office Visit:  9/8/2020  Date of Hospital Admission: 9/2/20 - ED visit  Date of Hospital Discharge: 9/2/20  Readmission Risk Score(high >=14%. Medium >=10%):Readmission Risk Score: 52      Care management risk score Rising risk (score 2-5) and Complex Care (Scores >=6): 14     Non face to face  following discharge, date last encounter closed (first attempt may have been earlier): *No documented post hospital discharge outreach found in the last 14 days *No documented post hospital discharge outreach found in the last 14 days    Call initiated 2 business days of discharge: *No response recorded in the last 14 days     Patient Active Problem List   Diagnosis    Status post below knee amputation of right lower extremity ([facility] [address]    Gait disturbance    Sebaceous cyst    Uncontrolled type 2 diabetes mellitus with hyperglycemia, with long-term current use of insulin (HCC)    Severe bipolar II disorder, recent episode major depressive, remission ([facility] [address]    Bipolar 1 disorder (HCC)    Leukocytosis    Lactic acidosis    Hyponatremia    Normocytic anemia    Obesity (BMI 30-39. 9)    History of noncompliance with medical treatment    Essential hypertension    Tobacco dependence    Gastroesophageal reflux disease without esophagitis    History of marijuana use    Physical debility    Anemia    Electrolyte imbalance    Type 2 diabetes mellitus with hyperglycemia, with long-term current use of insulin (HCC)    Weakness    Infection of above knee amputation stump of right leg (HCC)    Above knee amputation of right lower extremity (HCC)    Sepsis (HCC)    Vitamin D deficiency    COPD exacerbation (HCC)    Influenza B    Depression, recurrent (HCC)    Major depressive disorder, recurrent (HCC)    GI bleed    Elevated lipase    Other psychoactive substance abuse, uncomplicated ([address]    Calculus of gallbladder without cholecystitis without obstruction    Right upper quadrant abdominal pain    Pedal edema    MURALI (obstructive sleep apnea)    Shortness of breath    Hypothyroid    Anxiety and depression    Dyspnea    Sinus tachycardia    Metabolic acidosis    Traumatic open wound of left lower leg with delayed healing    Edema of left lower extremity       Allergies   Allergen Reactions    Pcn [Penicillins] Shortness Of Breath, Nausea And Vomiting and Other (See Comments)     Does not remember reactions. Has TOLERATED amoxicillin and several different cephalosporins.  Actos [Pioglitazone] Swelling    Clindamycin/Lincomycin Itching    Vancomycin Hives      Rash, with erythematous plaques to abdomen, shoulders, and proximal upper extremities with pruritis, persisted with 2nd dose administered slower. Medications listed as ordered at the time of discharge from hospital   [facility] Medication Instructions TRICE:    Printed on:09/21/20 4904   Medication Information                      AMINO ACIDS COMPLEX PO  Take 1 each by mouth daily Daily AA drink             blood glucose monitor kit and supplies  Accu Chek Sheila meter. Use to test blood sugars DX:E11.65             blood glucose monitor strips  Accucheck Sheila Test Strips Test blood sugars 4 times daily DX:E11.65             gabapentin (NEURONTIN) 300 MG capsule  Take 1 capsule by mouth 3 times daily for 60 days.              Insulin Glargine, 2 Unit Dial, 300 UNIT/ML SOPN  Inject 86 Units into the skin 2 times daily             insulin lispro, 1 Unit Dial, (HUMALOG KWIKPEN) 100 UNIT/ML SOPN  Inject 30 Units into the skin 3 times daily (before meals)             Insulin Syringe-Needle U-100 29G X 1/2\" 0.3 ML MISC  1 each by Does not apply route 4 times daily (after meals and at bedtime)             Lancets MISC  Use to test blood sugars 4 times daily DX:E11.65             levothyroxine (SYNTHROID) 50 MCG tablet  Take 1 tablet by mouth daily             lisinopril (PRINIVIL;ZESTRIL) 5 MG tablet  Take 1 tablet by mouth daily             metoprolol tartrate (LOPRESSOR) 25 MG tablet  Take 1 tablet by mouth 2 times daily             ondansetron (ZOFRAN ODT) 4 MG disintegrating tablet  Take 1 tablet by mouth every 8 hours as needed for Nausea or Vomiting             oxybutynin (DITROPAN) 5 MG tablet  Take 1 tablet by mouth 3 times daily             pantoprazole (PROTONIX) 20 MG tablet  Take 1 tablet by mouth every morning (before breakfast)             sertraline (ZOLOFT) 50 MG tablet  Take 1 tablet by mouth daily             tamsulosin (FLOMAX) 0.4 MG capsule  Take 1 capsule by mouth daily Take one capsule daily to facilitate passage of ureteral stone                   Medications marked \"taking\" at this time  Outpatient Medications Marked as Taking for the 9/8/20 encounter (Office Visit) with MARILYN Chau - CNP   Medication Sig Dispense Refill    pantoprazole (PROTONIX) 20 MG tablet Take 1 tablet by mouth every morning (before breakfast) 30 tablet 3    ondansetron (ZOFRAN ODT) 4 MG disintegrating tablet Take 1 tablet by mouth every 8 hours as needed for Nausea or Vomiting 20 tablet 0    oxybutynin (DITROPAN) 5 MG tablet Take 1 tablet by mouth 3 times daily 20 tablet 0    [DISCONTINUED] sertraline (ZOLOFT) 50 MG tablet Take 1 tablet by mouth daily 30 tablet 5    [DISCONTINUED] gabapentin (NEURONTIN) 300 MG capsule Take 1 capsule by mouth 3 times daily for 60 days.  90 capsule 1    tamsulosin (FLOMAX) 0.4 MG capsule Take 1 capsule by mouth daily Take one capsule daily to facilitate passage of ureteral stone 30 capsule 6    levothyroxine (SYNTHROID) 50 MCG tablet Take 1 tablet by mouth daily 30 tablet 5    [DISCONTINUED] insulin aspart (NOVOLOG FLEXPEN) 100 UNIT/ML injection pen Inject 25 Units into the skin 3 times daily (before meals) 22.5 mL 5    [DISCONTINUED] blood glucose monitor kit and supplies Accu Chek Sheila meter. Use to test blood sugars DX:E11.65 1 kit 0    [DISCONTINUED] blood glucose monitor strips Accucheck Sheila Test Strips Test blood sugars 4 times daily DX:E11.65 400 strip 5    [DISCONTINUED] Lancets MISC Use to test blood sugars 4 times daily DX:E11.65 400 each 5    metoprolol tartrate (LOPRESSOR) 25 MG tablet Take 1 tablet by mouth 2 times daily 60 tablet 0    lisinopril (PRINIVIL;ZESTRIL) 5 MG tablet Take 1 tablet by mouth daily 30 tablet 0    [DISCONTINUED] insulin detemir (LEVEMIR FLEXTOUCH) 100 UNIT/ML injection pen Inject 85 Units into the skin 2 times daily (Patient taking differently: Inject 55 Units into the skin 2 times daily ) 30 pen 5        Medications patient taking as of now reconciled against medications ordered at time of hospital discharge: Yes    Chief Complaint   Patient presents with    Follow-Up from [address] [facility] course: Discharge summary reviewed- see chart. Interval history/Current status: Went to ED for shortness of breath, abdominal pain, nausea/emesis. CTA negative for PE. Showed small 1 cm pericardial effusion. DM - Glucose 200's fasting. Goes down to 140's during the day. States he has nausea after all meals/drinks and has diarrhea immediately after eating, typically within 30 minutes. Lactic acid has been chronically elevated. Likely related to glucose control. Continues to complain of sweating. Does not check his sugars as he states he feels fine and is \"not low or high\". Does not bring meter for download today. Reports he is taking his medication. Inconsistent reports. Review of Systems   Constitutional: Positive for diaphoresis (REports frequent sweating despite temp). Negative for chills, fatigue and fever. HENT: Negative for sore throat and trouble swallowing. Eyes: Negative for visual disturbance. Respiratory: Negative for cough and shortness of breath.     Cardiovascular: Negative for chest pain and leg swelling. Gastrointestinal: Negative for abdominal pain, constipation, diarrhea, nausea and vomiting. Endocrine: Positive for heat intolerance. Negative for cold intolerance, polydipsia, polyphagia and polyuria. Genitourinary: Negative for difficulty urinating. Musculoskeletal: Negative for arthralgias and myalgias. Right AKA   Skin: Negative for rash and wound. Neurological: Positive for numbness (Chronic neuropathy). Vitals:    09/08/20 0918   BP: 130/60   Site: Right Upper Arm   Position: Sitting   Cuff Size: Large Adult   Pulse: 102   Temp: 98.4 °F (36.9 °C)   TempSrc: Temporal   SpO2: 99%     There is no height or weight on file to calculate BMI. Wt Readings from Last 3 Encounters:   09/17/20 212 lb (96.2 kg)   09/14/20 212 lb (96.2 kg)   09/10/20 200 lb (90.7 kg)     BP Readings from Last 3 Encounters:   09/18/20 (!) 154/78   09/17/20 130/70   09/10/20 (!) 154/89     9/2/2020  Narrative    PROCEDURE: CT ABDOMEN PELVIS W IV CONTRAST        CLINICAL INFORMATION: 42-year-old male with abdominal pain . COMPARISON: CT scan 8/22/2020. TECHNIQUE: 5 mm axial CT images were obtained through the abdomen and pelvis after the administration of intravenous and oral contrast. Coronal and sagittal reconstructions were obtained. All CT scans at this facility use dose modulation, iterative reconstruction, and/or weight-based dosing when appropriate to reduce radiation dose to as low as reasonably achievable. FINDINGS:    The lung bases are clear. There is no pleural effusion. The base of the heart is within acceptable limits. There is diffuse hypoattenuation of the liver consistent with fatty infiltration. The gallbladder surgically absent. The spleen, pancreas and adrenal glands are within normal limits. The kidneys are symmetric in size, shape and degree of enhancement. There is no hydronephrosis.         There is no evidence of small bowel obstruction. There is no colonic wall thickening or edema. The urinary bladder is within normal limits. Surgical changes are seen in the lower pelvis. There is no free intraperitoneal air or free fluid in the abdomen or pelvis. Atherosclerotic changes are in the abdominal aorta. There is no aneurysmal dilatation. The portal vein is patent. The IVC is normal in caliber. There are some degenerative changes in the spine. No acute fractures. Impression        1. There is no acute process in the abdomen or pelvis. 2. Hepatic steatosis. Narrative    PROCEDURE: CTA CHEST W WO CONTRAST        CLINICAL INFORMATION: 49-year-old male with shortness of breath. COMPARISON: CT scan 6/19/2020. TECHNIQUE: 3 mm axial images were obtained through the chest after the administration of IV contrast.  A non-contrast localizer was obtained. 3D reconstructions were performed on the scanner to include MIP images through the right and left pulmonary    arteries and sagittal images through the chest. Isovue was the intravenous contrast utilized. All CT scans at this facility use dose modulation, iterative reconstruction, and/or weight-based dosing when appropriate to reduce radiation dose to as low as reasonably achievable. FINDINGS:    The thyroid gland is present. The central airway is patent. The heart is normal in size. There is a small pericardial effusion at the inferior aspect of the heart (axial image #123) which measures approximately 1 cm in maximum thickness. The thoracic aorta is normal in caliber. There is no aneurysmal dilatation. There is bilateral gynecomastia. Opacification of the pulmonary arteries is suboptimal. However, there are no filling defects in the main pulmonary arteries to suggest pulmonary embolism. There are no pathologically enlarged lymph nodes.         There is appearance with no evidence of a pericardial   effusion. Pleural Effusion   No evidence of pleural effusion. Aorta / Great Vessels   -Aortic root dimension within normal limits.   -The Pulmonary artery is within normal limits. -IVC size is within normal limits with normal respiratory phasic changes. Physical Exam  Vitals signs reviewed. Constitutional:       General: He is not in acute distress. Appearance: He is well-developed. He is not diaphoretic. HENT:      Head: Normocephalic and atraumatic. Mouth/Throat:      Pharynx: No oropharyngeal exudate. Neck:      Musculoskeletal: Normal range of motion and neck supple. Thyroid: No thyromegaly. Cardiovascular:      Rate and Rhythm: Normal rate and regular rhythm. Heart sounds: Normal heart sounds. No murmur. No friction rub. No gallop. Pulmonary:      Effort: Pulmonary effort is normal. No respiratory distress. Breath sounds: Normal breath sounds. No wheezing or rales. Abdominal:      General: There is no distension. Palpations: Abdomen is soft. Tenderness: There is no abdominal tenderness. Musculoskeletal: Normal range of motion. General: No tenderness. Comments: Right AKA, stump intact   Lymphadenopathy:      Cervical: No cervical adenopathy. Skin:     General: Skin is warm and dry. Coloration: Skin is not pale. Findings: No erythema or rash. Comments: Scabbed areas fingers left hand, no drainage or redness. Left shin with skin tear, intact, no signs of infection,no drainagege   Neurological:      Mental Status: He is alert and oriented to person, place, and time. Assessment/Plan:  1. Uncontrolled type 2 diabetes mellitus with hyperglycemia, with long-term current use of insulin ([facility])  Meter download for insulin adjustment,did not bring to appt today. Continue current mealtime insulin for now  Check glucose 3 times daily and dose mealtime insulin as advised. As he reports elevated fasting glucose >200, advised to increase Glargine insulin by 2 units and bring meter or call in sugars in 3 days for download and adjustment. He is agreeable. 2. Pericardial effusion  Repeat echo  No chest pain, no increased WOB, stable without distress todays' visit. - ECHO 2D WO Color Doppler Complete; Future  - [facility]    3. Dyspnea, unspecified type  Due to presence of small effusion on CT and dyspnea will repeat echo  - ECHO 2D WO Color Doppler Complete; Future    4. Gastroesophageal reflux disease, esophagitis presence not specified  - External Referral To Gastroenterology  - pantoprazole (PROTONIX) 20 MG tablet; Take 1 tablet by mouth every morning (before breakfast)  Dispense: 30 tablet; Refill: 3    5. Diarrhea, unspecified type  Will send to GI as he is having intermittent diarrhea after eating, off Metformin, no signs of infection, no black/bloody stools, no nausea/vomiting. He likely needs scope, etc.     - External Referral To Gastroenterology    6. Nausea  - pantoprazole (PROTONIX) 20 MG tablet; Take 1 tablet by mouth every morning (before breakfast)  Dispense: 30 tablet; Refill: 3    7. Wound of left lower extremity, subsequent encounter  Betadine to scabbed areas on fingers, keep clean and dry, open to air. Warm soapy water to wound on shin, antibiotic ointment and non stick pad to help heal.   Call if not improving, any drainage, redness, warmth. 8. Above knee amputation of right lower extremity (HCC)  Stump stable. 9. Lactic acidosis  Chronically elevated, blood cultures no growth, negative CXR, white count intact.          Medical Decision Making: moderate complexity

## 2020-09-08 NOTE — CARE COORDINATION
Attempted to reach patient for covid-19 f/u s/p recent ED visit for c/o SOB, emesis, and abdominal pain . COVID-19 testing was completed and noted to be negative. Patient was not available at the time of my call and generic voicemail message was left asking patient to please return call to my direct number. This was second and final attempt to reach patient and no further attempt will be made if patient does not return call.

## 2020-09-10 ENCOUNTER — PATIENT MESSAGE (OUTPATIENT)
Dept: INTERNAL MEDICINE CLINIC | Age: 50
End: 2020-09-10

## 2020-09-10 ENCOUNTER — HOSPITAL ENCOUNTER (EMERGENCY)
Age: 50
Discharge: HOME | End: 2020-09-10
Attending: EMERGENCY MEDICINE
Payer: MEDICARE

## 2020-09-10 ENCOUNTER — APPOINTMENT (OUTPATIENT)
Dept: CT IMAGING | Age: 50
End: 2020-09-10
Payer: MEDICARE

## 2020-09-10 VITALS
HEART RATE: 90 BPM | SYSTOLIC BLOOD PRESSURE: 154 MMHG | WEIGHT: 200 LBS | HEIGHT: 66 IN | BODY MASS INDEX: 32.14 KG/M2 | RESPIRATION RATE: 16 BRPM | TEMPERATURE: 98.1 F | OXYGEN SATURATION: 98 % | DIASTOLIC BLOOD PRESSURE: 89 MMHG

## 2020-09-10 LAB
ALBUMIN SERPL-MCNC: 4.3 G/DL (ref 3.5–5.1)
ALP BLD-CCNC: 79 U/L (ref 38–126)
ALT SERPL-CCNC: 30 U/L (ref 11–66)
ANION GAP SERPL CALCULATED.3IONS-SCNC: 14 MEQ/L (ref 8–16)
AST SERPL-CCNC: 32 U/L (ref 5–40)
BASOPHILS # BLD: 0.7 %
BASOPHILS ABSOLUTE: 0.1 THOU/MM3 (ref 0–0.1)
BILIRUB SERPL-MCNC: 0.4 MG/DL (ref 0.3–1.2)
BILIRUBIN URINE: NEGATIVE
BLOOD, URINE: NEGATIVE
BUN BLDV-MCNC: 9 MG/DL (ref 7–22)
CALCIUM SERPL-MCNC: 9.8 MG/DL (ref 8.5–10.5)
CHARACTER, URINE: CLEAR
CHLORIDE BLD-SCNC: 96 MEQ/L (ref 98–111)
CO2: 25 MEQ/L (ref 23–33)
COLOR: YELLOW
CREAT SERPL-MCNC: 0.7 MG/DL (ref 0.4–1.2)
EOSINOPHIL # BLD: 5 %
EOSINOPHILS ABSOLUTE: 0.5 THOU/MM3 (ref 0–0.4)
ERYTHROCYTE [DISTWIDTH] IN BLOOD BY AUTOMATED COUNT: 14.6 % (ref 11.5–14.5)
ERYTHROCYTE [DISTWIDTH] IN BLOOD BY AUTOMATED COUNT: 47.7 FL (ref 35–45)
GFR SERPL CREATININE-BSD FRML MDRD: > 90 ML/MIN/1.73M2
GLUCOSE BLD-MCNC: 385 MG/DL (ref 70–108)
GLUCOSE, URINE: >= 1000 MG/DL
HCT VFR BLD CALC: 46.2 % (ref 42–52)
HEMOCCULT STL QL: NEGATIVE
HEMOGLOBIN: 16 GM/DL (ref 14–18)
IMMATURE GRANS (ABS): 0.11 THOU/MM3 (ref 0–0.07)
IMMATURE GRANULOCYTES: 1 %
KETONES, URINE: NEGATIVE
LACTIC ACID: 3.6 MMOL/L (ref 0.5–2.2)
LEUKOCYTE EST, POC: NEGATIVE
LIPASE: 89 U/L (ref 5.6–51.3)
LYMPHOCYTES # BLD: 21 %
LYMPHOCYTES ABSOLUTE: 2.2 THOU/MM3 (ref 1–4.8)
MCH RBC QN AUTO: 31.4 PG (ref 26–33)
MCHC RBC AUTO-ENTMCNC: 34.6 GM/DL (ref 32.2–35.5)
MCV RBC AUTO: 90.8 FL (ref 80–94)
MONOCYTES # BLD: 8.3 %
MONOCYTES ABSOLUTE: 0.9 THOU/MM3 (ref 0.4–1.3)
NITRITE, URINE: NEGATIVE
NUCLEATED RED BLOOD CELLS: 0 /100 WBC
OSMOLALITY CALCULATION: 284.7 MOSMOL/KG (ref 275–300)
PH UA: 5 (ref 5–9)
PLATELET # BLD: 252 THOU/MM3 (ref 130–400)
PMV BLD AUTO: 9.8 FL (ref 9.4–12.4)
POTASSIUM REFLEX MAGNESIUM: 4.6 MEQ/L (ref 3.5–5.2)
PROTEIN UA: NEGATIVE MG/DL
RBC # BLD: 5.09 MILL/MM3 (ref 4.7–6.1)
SEG NEUTROPHILS: 64 %
SEGMENTED NEUTROPHILS ABSOLUTE COUNT: 6.7 THOU/MM3 (ref 1.8–7.7)
SODIUM BLD-SCNC: 135 MEQ/L (ref 135–145)
SPECIFIC GRAVITY UA: > 1.03 (ref 1–1.03)
TOTAL PROTEIN: 8.5 G/DL (ref 6.1–8)
UROBILINOGEN, URINE: 0.2 EU/DL (ref 0–1)
WBC # BLD: 10.5 THOU/MM3 (ref 4.8–10.8)

## 2020-09-10 PROCEDURE — 96360 HYDRATION IV INFUSION INIT: CPT

## 2020-09-10 PROCEDURE — 82272 OCCULT BLD FECES 1-3 TESTS: CPT

## 2020-09-10 PROCEDURE — 80053 COMPREHEN METABOLIC PANEL: CPT

## 2020-09-10 PROCEDURE — 87425 ROTAVIRUS AG IA: CPT

## 2020-09-10 PROCEDURE — 85025 COMPLETE CBC W/AUTO DIFF WBC: CPT

## 2020-09-10 PROCEDURE — 87045 FECES CULTURE AEROBIC BACT: CPT

## 2020-09-10 PROCEDURE — 99284 EMERGENCY DEPT VISIT MOD MDM: CPT

## 2020-09-10 PROCEDURE — 74177 CT ABD & PELVIS W/CONTRAST: CPT

## 2020-09-10 PROCEDURE — 6360000004 HC RX CONTRAST MEDICATION: Performed by: EMERGENCY MEDICINE

## 2020-09-10 PROCEDURE — 87328 CRYPTOSPORIDIUM AG IA: CPT

## 2020-09-10 PROCEDURE — 96372 THER/PROPH/DIAG INJ SC/IM: CPT

## 2020-09-10 PROCEDURE — 99285 EMERGENCY DEPT VISIT HI MDM: CPT

## 2020-09-10 PROCEDURE — 83690 ASSAY OF LIPASE: CPT

## 2020-09-10 PROCEDURE — 81003 URINALYSIS AUTO W/O SCOPE: CPT

## 2020-09-10 PROCEDURE — 36415 COLL VENOUS BLD VENIPUNCTURE: CPT

## 2020-09-10 PROCEDURE — 87427 SHIGA-LIKE TOXIN AG IA: CPT

## 2020-09-10 PROCEDURE — 2580000003 HC RX 258: Performed by: EMERGENCY MEDICINE

## 2020-09-10 PROCEDURE — 6360000002 HC RX W HCPCS: Performed by: EMERGENCY MEDICINE

## 2020-09-10 PROCEDURE — 83605 ASSAY OF LACTIC ACID: CPT

## 2020-09-10 PROCEDURE — 96361 HYDRATE IV INFUSION ADD-ON: CPT

## 2020-09-10 RX ORDER — 0.9 % SODIUM CHLORIDE 0.9 %
1000 INTRAVENOUS SOLUTION INTRAVENOUS ONCE
Status: COMPLETED | OUTPATIENT
Start: 2020-09-10 | End: 2020-09-10

## 2020-09-10 RX ORDER — DICYCLOMINE HYDROCHLORIDE 10 MG/ML
20 INJECTION INTRAMUSCULAR ONCE
Status: COMPLETED | OUTPATIENT
Start: 2020-09-10 | End: 2020-09-10

## 2020-09-10 RX ORDER — LOPERAMIDE HYDROCHLORIDE 2 MG/1
2 CAPSULE ORAL 4 TIMES DAILY PRN
Qty: 20 CAPSULE | Refills: 0 | Status: SHIPPED | OUTPATIENT
Start: 2020-09-10 | End: 2020-09-20

## 2020-09-10 RX ADMIN — IOPAMIDOL 80 ML: 755 INJECTION, SOLUTION INTRAVENOUS at 16:45

## 2020-09-10 RX ADMIN — SODIUM CHLORIDE 1000 ML: 9 INJECTION, SOLUTION INTRAVENOUS at 16:24

## 2020-09-10 RX ADMIN — DICYCLOMINE HYDROCHLORIDE 20 MG: 10 INJECTION INTRAMUSCULAR at 16:24

## 2020-09-10 RX ADMIN — SODIUM CHLORIDE 1000 ML: 9 INJECTION, SOLUTION INTRAVENOUS at 15:00

## 2020-09-10 ASSESSMENT — ENCOUNTER SYMPTOMS
ABDOMINAL PAIN: 0
SINUS PRESSURE: 0
RECTAL PAIN: 0
COUGH: 0
BLOOD IN STOOL: 0
BACK PAIN: 0
VOMITING: 0
DIARRHEA: 1
SINUS PAIN: 0
SHORTNESS OF BREATH: 0
NAUSEA: 1
CHEST TIGHTNESS: 0
EYE PAIN: 0
CONSTIPATION: 0

## 2020-09-10 ASSESSMENT — PAIN DESCRIPTION - PAIN TYPE
TYPE: ACUTE PAIN
TYPE: ACUTE PAIN

## 2020-09-10 ASSESSMENT — PAIN DESCRIPTION - LOCATION
LOCATION: ABDOMEN
LOCATION: ABDOMEN

## 2020-09-10 ASSESSMENT — PAIN DESCRIPTION - FREQUENCY: FREQUENCY: INTERMITTENT

## 2020-09-10 ASSESSMENT — PAIN SCALES - GENERAL
PAINLEVEL_OUTOF10: 8
PAINLEVEL_OUTOF10: 6

## 2020-09-10 ASSESSMENT — PAIN DESCRIPTION - DESCRIPTORS: DESCRIPTORS: TENDER;THROBBING

## 2020-09-10 NOTE — ED NOTES
Pt updated on POC at this time. Pt verbalized understanding. Pt denies any further questions. Vitals stable.  Call light in reach     Taylor Jordan RN  09/10/20 [id number]

## 2020-09-10 NOTE — ED NOTES
Pt assisted to restroom at this time. Assisted back to bed. Pt was able to provide stool sample and urine sample at this time. Samples collected and sent to lab. Pt vitals remain stable. Call light in reach.  Will continue to monitor      Yesenia Can RN  09/10/20 [id number]

## 2020-09-10 NOTE — ED TRIAGE NOTES
Pt presents to the ED through triage with c/o abdominal pain and diarrhea. Pt states he has been having diarrhea for about 1 month. Pt states the abdominal pain is about 8/10 at this time. Vitals stable. Pt A&O x4. Resp even and unlabored.

## 2020-09-10 NOTE — TELEPHONE ENCOUNTER
From: Mary Ann Alejo  To: MARILYN Vazquez - CNP  Sent: 9/10/2020 1:43 PM EDT  Subject: Visit Follow-Up Question    I need to be admitted to find out what's going on with my body. I just ate healthy 20 seconds later I have diahrrea and it's green.  Plus my stomach is also hurting I need a hospitalist to call me

## 2020-09-10 NOTE — ED NOTES
Pt reassessed at this time. Resting on cot, awaiting provider re-eval. Vitals remain stable. Denies any needs. Call light in reach.  Will continue to monitor      Carlton Perrin RN  09/10/20 [id number]

## 2020-09-10 NOTE — ED PROVIDER NOTES
[place] ENCOUNTER          Pt Name: Bri Looney  MRN: [id number]  [place] 1970  Date of evaluation: 9/10/2020  Physician: Trellis Koyanagi, MD, [facility], [address] [facility]       Chief Complaint   Patient presents with    Diarrhea     History obtained from chart review and the patient. HISTORY OF PRESENT ILLNESS    HPI  Bri Looney is a 44 y.o. male who presents to the emergency department for evaluation of diarrhea. Patient states for the last 1 month has had one episode of diarrhea after every time he eats, just minutes after eating. Patient states he has occasional nausea, no vomiting and no abdominal pain. He has been to his primary care doctor and after going multiple times he was advised to come to the emergency department for evaluation. Patient states diarrhea is described as soft stool, brown color that has been becoming green in the last few days. Denies sick contacts, denies different changes in diet, denies recent travel. [place] fever. The patient has no other acute complaints at this time. REVIEW OF SYSTEMS   Review of Systems   Constitutional: Negative for chills, fever and unexpected weight change. HENT: Negative for congestion, ear pain, nosebleeds, sinus pressure and sinus pain. Eyes: Negative for pain. Respiratory: Negative for cough, chest tightness and shortness of breath. Cardiovascular: Negative for chest pain. Gastrointestinal: Positive for diarrhea and nausea. Negative for abdominal pain, blood in stool, constipation, rectal pain and vomiting. Endocrine: Negative for polydipsia and polyuria. Genitourinary: Negative for dysuria and flank pain. Musculoskeletal: Negative for arthralgias, back pain, myalgias and neck pain. Skin: Negative for rash. Neurological: Negative for tremors, seizures, weakness and headaches.    All other systems reviewed and are negative. PAST MEDICAL AND SURGICAL HISTORY     Past Medical History:   Diagnosis Date    Bipolar 2 disorder [facility])     previously followed with Dr. Brittany Callahan and Niya Lara in [facility] BPH (benign prostatic hyperplasia)     Diabetes mellitus type 2, uncontrolled ([address]     HgbA1c on 4/2/2019 was 9.1.     Diabetic peripheral neuropathy (HCC)     Diabetic polyneuropathy ([address]     Diabetic ulcer of right foot associated with type 2 diabetes mellitus ([address] 12/10/2015    Essential hypertension     \"never been on b/p medication that I know of\"    GERD (gastroesophageal reflux disease)     Hammer toe of left foot     Heart murmur     denies any chest pain or palpitations    History of tobacco abuse     Hx of AKA (above knee amputation), right ([address] 04/18/2019    Dr. Al Sosa edema, diabetic, bilateral ([address] 05/04/2018    Dr. Yusuf Gore referred to retina specialist for 2nd opinion    Marijuana abuse in remission     Melena     MRSA (methicillin resistant staph aureus) culture positive     Onychomycosis     Other disorders of kidney and ureter in diseases classified elsewhere     Sleep apnea     no cpap    Thyroid disease     WD-Skin ulcer of fourth toe of right foot with necrosis of bone ([address] 6/29/2016     Past Surgical History:   Procedure Laterality Date    ABSCESS DRAINAGE Right     foot    AMPUTATION ABOVE KNEE Right 4/18/2019    RIGHT ABOVE KNEE AMPUTATION performed by Jahaira Valle MD at [address] [place] [address], LAPAROSCOPIC N/A 4/1/2020    ROBOTIC CHOLECYSTECTOMY performed by Markie Ovalle MD at [address] [facility] Prachi Sepulveda 7/20/2020    CYSTOSCOPY performed by Norma Rowan MD at [address] [facility] Prachi Sepulveda 8/21/2020    CYSTOSCOPY, UROLIFT performed by Norma Rowan MD at [facility], DIAGNOSTIC      FOOT DEBRIDEMENT Right 07/01/2016    I & D    INCISION AND DRAINAGE Right 2/18/2019    I&D RIGHT STUMP performed by Gómez Vogt Disp: 22.5 mL, Rfl: 5    metoprolol tartrate (LOPRESSOR) 25 MG tablet, Take 1 tablet by mouth 2 times daily, Disp: 60 tablet, Rfl: 0    lisinopril (PRINIVIL;ZESTRIL) 5 MG tablet, Take 1 tablet by mouth daily, Disp: 30 tablet, Rfl: 0    insulin detemir (LEVEMIR FLEXTOUCH) 100 UNIT/ML injection pen, Inject 85 Units into the skin 2 times daily (Patient taking differently: Inject 55 Units into the skin 2 times daily ), Disp: 30 pen, Rfl: 5    ondansetron (ZOFRAN ODT) 4 MG disintegrating tablet, Take 1 tablet by mouth every 8 hours as needed for Nausea or Vomiting, Disp: 20 tablet, Rfl: 0    blood glucose monitor kit and supplies, Accu Chek Sheila meter. Use to test blood sugars DX:E11.65, Disp: 1 kit, Rfl: 0    blood glucose monitor strips, Accucheck Sheila Test Strips Test blood sugars 4 times daily DX:E11.65, Disp: 400 strip, Rfl: 5    Lancets MISC, Use to test blood sugars 4 times daily DX:E11.65, Disp: 400 each, Rfl: 5    AMINO ACIDS COMPLEX PO, Take 1 each by mouth daily Daily AA drink, Disp: , Rfl:     Insulin Syringe-Needle U-100 29G X 1/2\" 0.3 ML MISC, 1 each by Does not apply route 4 times daily (after meals and at bedtime), Disp: 200 each, Rfl: 0      SOCIAL HISTORY     Social History     Social History Narrative    Not on file     Social History     Tobacco Use    Smoking status: Current Some Day Smoker     Packs/day: 1.00     Years: 10.00     Pack years: 10.00     Types: Cigars     Start date: 1985     Last attempt to quit: 2000     Years since quittin.0    Smokeless tobacco: Never Used   Substance Use Topics    Alcohol use: Not Currently     Alcohol/week: 0.0 standard drinks    Drug use: No     Comment: 30 YEARS AGO         ALLERGIES     Allergies   Allergen Reactions    Pcn [Penicillins] Shortness Of Breath, Nausea And Vomiting and Other (See Comments)     Does not remember reactions. Has TOLERATED amoxicillin and several different cephalosporins.      Actos [Pioglitazone] Swelling  Clindamycin/Lincomycin Itching    Vancomycin Hives      Rash, with erythematous plaques to abdomen, shoulders, and proximal upper extremities with pruritis, persisted with 2nd dose administered slower. FAMILY HISTORY     Family History   Problem Relation Age of Onset    Diabetes Mother     Other Mother         pneumonia, H1N1    Depression Mother     Early Death Mother     High Blood Pressure Mother     High Cholesterol Mother     Vision Loss Maternal Grandmother     Arthritis Maternal Grandfather     Heart Disease Maternal Grandfather          PREVIOUS RECORDS   Previous records reviewed: Patient was seen in the emergency department on September 2, 2020 for shortness of breath, he did mention nausea vomiting and diarrhea at that time. PHYSICAL EXAM     ED Triage Vitals   BP Temp Temp Source Pulse Resp SpO2 Height Weight   09/10/20 1432 09/10/20 1432 09/10/20 1432 09/10/20 1432 09/10/20 1432 09/10/20 1432 09/10/20 1442 09/10/20 1442   (!) 143/109 98.1 °F (36.7 °C) Oral 113 17 98 % 5' 6\" (1.676 m) 200 lb (90.7 kg)     Initial vital signs and nursing assessment reviewed and abnormal from Hypertension. Pulsoximetry is normal per my interpretation. Additional Vital Signs:  Vitals:    09/10/20 1822   BP: (!) 154/89   Pulse: 90   Resp: 16   Temp:    SpO2: 98%       Physical Exam  Vitals signs and nursing note reviewed. Constitutional:       General: He is not in acute distress. Appearance: He is well-developed. HENT:      Head: Normocephalic and atraumatic. Right Ear: External ear normal.      Left Ear: External ear normal.      Nose: Nose normal.      Mouth/Throat:      Mouth: Mucous membranes are dry. Eyes:      Conjunctiva/sclera: Conjunctivae normal.      Pupils: Pupils are equal, round, and reactive to light. Neck:      Musculoskeletal: Neck supple. Cardiovascular:      Rate and Rhythm: Normal rate and regular rhythm. Heart sounds: Normal heart sounds.  No murmur. No friction rub. No gallop. Pulmonary:      Effort: Pulmonary effort is normal. No respiratory distress. Breath sounds: Normal breath sounds. No stridor. No wheezing or rales. Abdominal:      General: Abdomen is flat. There is no distension. Palpations: Abdomen is soft. There is no mass. Tenderness: There is no abdominal tenderness. There is no guarding or rebound. Hernia: No hernia is present. Musculoskeletal:      Comments: AKA of the right lower extremity. Skin:     General: Skin is warm and dry. Comments: There is a healing ulcer without signs of infection on left anterior shin. Neurological:      Mental Status: He is alert and oriented to person, place, and time. Cranial Nerves: No cranial nerve deficit. Psychiatric:         Behavior: Behavior normal.             MEDICAL DECISION MAKING   Initial Assessment: Chronic diarrhea, pending GI appointment on September 16. Previous abnormal lab work. Mild dehydration. Plan: I V line, labs, IV fluids, imaging, observation.         ED RESULTS   Laboratory results:  Labs Reviewed   CBC WITH AUTO DIFFERENTIAL - Abnormal; Notable for the following components:       Result Value    RDW-CV 14.6 (*)     RDW-SD 47.7 (*)     Eosinophils Absolute 0.5 (*)     Immature Grans (Abs) 0.11 (*)     All other components within normal limits   COMPREHENSIVE METABOLIC PANEL W/ REFLEX TO MG FOR LOW K - Abnormal; Notable for the following components:    Glucose 385 (*)     Chloride 96 (*)     Total Protein 8.5 (*)     All other components within normal limits   LIPASE - Abnormal; Notable for the following components:    Lipase 89.0 (*)     All other components within normal limits   LACTIC ACID, PLASMA - Abnormal; Notable for the following components:    Lactic Acid 3.6 (*)     All other components within normal limits   URINALYSIS - Abnormal; Notable for the following components:    Glucose, Urine >= 1000 (*)     Specific Gravity, UA >1.030 (*)     All other components within normal limits   CULTURE, STOOL   GIARDIA ANTIGEN   BLOOD OCCULT STOOL SCREEN #1   ANION GAP   OSMOLALITY   GLOMERULAR FILTRATION RATE, ESTIMATED   ROTAVIRUS ANTIGEN, STOOL       Radiologic studies results:  CT ABDOMEN PELVIS W IV CONTRAST Additional Contrast? None   Final Result   1. No acute intra-abdominal or pelvic findings. 2. Diffuse fatty infiltration of the liver is demonstrated. **This report has been created using voice recognition software. It may contain minor errors which are inherent in voice recognition technology. **      Final report electronically signed by Dr. Hui Cuba on 9/10/2020 4:56 PM          ED Medications administered this visit:   Medications   0.9 % sodium chloride bolus (0 mLs Intravenous Stopped 9/10/20 1601)   0.9 % sodium chloride bolus (0 mLs Intravenous Stopped 9/10/20 1822)   dicyclomine (BENTYL) injection 20 mg (20 mg Intramuscular Given 9/10/20 1624)   iopamidol (ISOVUE-370) 76 % injection 80 mL (80 mLs Intravenous Given 9/10/20 1645)         ED COURSE      Strict return precautions and follow up instructions were discussed with the patient prior to discharge, with which the patient agrees. MEDICATION CHANGES     New Prescriptions    LOPERAMIDE (RA ANTI-DIARRHEAL) 2 MG CAPSULE    Take 1 capsule by mouth 4 times daily as needed for Diarrhea Take 1 capsule after every bowel movement, maximum of 4 capsules per day. Discontinue as diarrhea improves. FINAL DISPOSITION     Final diagnoses:   Functional diarrhea   Dehydration     Condition: condition: good  Dispo: Discharge to home      This transcription was electronically signed. It was dictated by use of voice recognition software and electronically transcribed. The transcription may contain errors not detected in proofreading.        Riana Woodard MD  09/10/20 2187

## 2020-09-11 LAB — ROTAVIRUS ANTIGEN: NEGATIVE

## 2020-09-11 RX ORDER — INSULIN ASPART 100 [IU]/ML
25 INJECTION, SOLUTION INTRAVENOUS; SUBCUTANEOUS
Qty: 22.5 ML | Refills: 5 | Status: CANCELLED | OUTPATIENT
Start: 2020-09-11 | End: 2021-03-10

## 2020-09-11 NOTE — TELEPHONE ENCOUNTER
Last visit- 9/8/2020  Next visit- 9/17/2020    Requested Prescriptions     Pending Prescriptions Disp Refills    insulin aspart (NOVOLOG FLEXPEN) 100 UNIT/ML injection pen 22.5 mL 5     Sig: Inject 25 Units into the skin 3 times daily (before meals)    blood glucose monitor kit and supplies 1 kit 0     Sig: Accu Chek Sheila meter. Use to test blood sugars DX:E11.65    blood glucose monitor strips 400 strip 5     Sig: Accucheck Sheila Test Strips Test blood sugars 4 times daily DX:E11.65    Lancets MISC 400 each 5     Sig: Use to test blood sugars 4 times daily DX:E11.65    sertraline (ZOLOFT) 50 MG tablet 30 tablet 5     Sig: Take 1 tablet by mouth daily    gabapentin (NEURONTIN) 300 MG capsule 90 capsule 1     Sig: Take 1 capsule by mouth 3 times daily for 60 days.

## 2020-09-12 LAB — CULTURE, STOOL: NORMAL

## 2020-09-14 ENCOUNTER — OFFICE VISIT (OUTPATIENT)
Dept: UROLOGY | Age: 50
End: 2020-09-14
Payer: MEDICARE

## 2020-09-14 VITALS — WEIGHT: 212 LBS | BODY MASS INDEX: 34.07 KG/M2 | TEMPERATURE: 97.4 F | HEIGHT: 66 IN

## 2020-09-14 LAB
BILIRUBIN URINE: NEGATIVE
BLOOD URINE, POC: ABNORMAL
CHARACTER, URINE: CLEAR
COLOR, URINE: YELLOW
GIARDIA ANTIGEN STOOL: NORMAL
GLUCOSE URINE: 500 MG/DL
KETONES, URINE: NEGATIVE
LEUKOCYTE CLUMPS, URINE: NEGATIVE
NITRITE, URINE: NEGATIVE
PH, URINE: 5.5 (ref 5–9)
POST VOID RESIDUAL (PVR): 79 ML
PROTEIN, URINE: NEGATIVE MG/DL
SPECIFIC GRAVITY, URINE: 1.01 (ref 1–1.03)
UROBILINOGEN, URINE: 0.2 EU/DL (ref 0–1)

## 2020-09-14 PROCEDURE — 3017F COLORECTAL CA SCREEN DOC REV: CPT | Performed by: UROLOGY

## 2020-09-14 PROCEDURE — 81003 URINALYSIS AUTO W/O SCOPE: CPT | Performed by: UROLOGY

## 2020-09-14 PROCEDURE — G8417 CALC BMI ABV UP PARAM F/U: HCPCS | Performed by: UROLOGY

## 2020-09-14 PROCEDURE — 99213 OFFICE O/P EST LOW 20 MIN: CPT | Performed by: UROLOGY

## 2020-09-14 PROCEDURE — 4004F PT TOBACCO SCREEN RCVD TLK: CPT | Performed by: UROLOGY

## 2020-09-14 PROCEDURE — 51798 US URINE CAPACITY MEASURE: CPT | Performed by: UROLOGY

## 2020-09-14 PROCEDURE — G8427 DOCREV CUR MEDS BY ELIG CLIN: HCPCS | Performed by: UROLOGY

## 2020-09-14 RX ORDER — LANCETS 30 GAUGE
EACH MISCELLANEOUS
Qty: 400 EACH | Refills: 5 | Status: ON HOLD | OUTPATIENT
Start: 2020-09-14 | End: 2021-07-28 | Stop reason: ALTCHOICE

## 2020-09-14 RX ORDER — GABAPENTIN 300 MG/1
300 CAPSULE ORAL 3 TIMES DAILY
Qty: 90 CAPSULE | Refills: 1 | Status: SHIPPED | OUTPATIENT
Start: 2020-09-14 | End: 2020-10-13 | Stop reason: SDUPTHER

## 2020-09-14 RX ORDER — GLUCOSAMINE HCL/CHONDROITIN SU 500-400 MG
CAPSULE ORAL
Qty: 400 STRIP | Refills: 5 | Status: SHIPPED
Start: 2020-09-14 | End: 2021-04-06 | Stop reason: SDUPTHER

## 2020-09-14 RX ORDER — INSULIN LISPRO 100 [IU]/ML
25 INJECTION, SOLUTION INTRAVENOUS; SUBCUTANEOUS
Qty: 5 PEN | Refills: 5 | Status: SHIPPED | OUTPATIENT
Start: 2020-09-14 | End: 2020-09-17 | Stop reason: SDUPTHER

## 2020-09-14 NOTE — PROGRESS NOTES
Dr. Chaya Romero MD  [facility] Urology Clinic Consultation / New Patient Visit    Patient:  Chadwick Gutierrez  YOB: 1970  Date: 9/14/2020  Consult requested from MARILYN Machado CNP     HISTORY OF PRESENT ILLNESS:   The patient is a 44 y.o. male who presents today for follow-up for the following problem(s): BPH, bladder wall thickening  Overall the problem(s) : are worsening. Associated Symptoms: No dysuria, gross hematuria. Pain Severity:      Today visit:    9/14/20  Today we see Benito Morales after Urolift. He had a catheter placed post op for concerns of retention, which was removed without complication and he is voiding well. PVR is 79 ml today, and we are very happy to see he is having success emptying his bladder after the procedure. The patient is very happy. He denies hematuria and frequency and urgency of urination. 8/10/20  Pt has history of diabetic cystopathy with BPH and obstruction. He had a cystoscopy which showed a small obstructing gland, with severe bladder trabeculations, no obstructing median lobe. Flomax is not working, and he has elevated PVRs. He does not want a catheter. He is interested in Urolift. Risks benefits and alternative procedures are explained, informed consent is obtained, and the patient elects to proceed. 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 - Nocturia: 5-6 times  Worsening:  Yes  Duration: years  Pain:  none  Bladder scan:  295 ml  CT abd pelvis: report reviewed  - No gross  abnormalities.   Bladder reported normal.   Meds:  none   History:  none  PSA:  none    PMH: DM, MURALI  ELI:  None      Summary of old records:   (Patient's old records, notes and chart reviewed and summarized above.)    Last several PSA's:  Lab Results   Component Value Date    PSA 0.78 03/24/2020       Last total testosterone:  No results found for: TESTOSTERONE    Urinalysis today:  Results for POC orders placed in visit on 09/14/20   POCT Urinalysis No Micro (Auto)   Result Value Ref Range    Glucose, Ur 500 (A) NEGATIVE mg/dl    Bilirubin Urine Negative     Ketones, Urine Negative NEGATIVE    Specific Gravity, Urine 1.015 1.002 - 1.030    Blood, UA POC Trace-lysed NEGATIVE    pH, Urine 5.50 5.0 - 9.0    Protein, Urine Negative NEGATIVE mg/dl    Urobilinogen, Urine 0.20 0.0 - 1.0 eu/dl    Nitrite, Urine Negative NEGATIVE    Leukocyte Clumps, Urine Negative NEGATIVE    Color, Urine Yellow [id number]    Character, Urine Clear [id number]   poct post void residual   Result Value Ref Range    post void residual 79 ml         Last BUN and creatinine:  Lab Results   Component Value Date    BUN 9 09/10/2020     Lab Results   Component Value Date    CREATININE 0.7 09/10/2020       Imaging Reviewed during this Office Visit:   (results were independently reviewed by physician and radiology report verified)    PAST MEDICAL, FAMILY AND SOCIAL HISTORY:  Past Medical History:   Diagnosis Date    [facility] 2 disorder ([facility]     previously followed with Dr. Elva Pringle and Yayo Ceballos in [facility] BPH (benign prostatic hyperplasia)     Diabetes mellitus type 2, uncontrolled ([facility]     HgbA1c on 4/2/2019 was 9.1.     Diabetic peripheral neuropathy (HCC)     Diabetic polyneuropathy ([facility]     Diabetic ulcer of right foot associated with type 2 diabetes mellitus ([facility] 12/10/2015    Essential hypertension     \"never been on b/p medication that I know of\"    GERD (gastroesophageal reflux disease)     Hammer toe of left foot     Heart murmur     denies any chest pain or palpitations    History of tobacco abuse     Hx of AKA (above knee amputation), right ([facility] 04/18/2019    Dr. Margy Cristina Macular edema, diabetic, bilateral ([facility] 05/04/2018    Dr. Conde Second referred to retina specialist for 2nd opinion    Marijuana abuse in remission     Melena     MRSA (methicillin resistant staph aureus) culture positive     Onychomycosis     Other disorders of kidney and ureter in diseases classified elsewhere     Sleep apnea     no cpap    Thyroid disease     WD-Skin ulcer of fourth toe of right foot with necrosis of bone ([facility]) 6/29/2016     Past Surgical History:   Procedure Laterality Date    ABSCESS DRAINAGE Right     foot    AMPUTATION ABOVE KNEE Right 4/18/2019    RIGHT ABOVE KNEE AMPUTATION performed by Jahaira Valle MD at [address], LAPAROSCOPIC N/A 4/1/2020    ROBOTIC CHOLECYSTECTOMY performed by Markie Ovalle MD at [address] N/A 7/20/2020    CYSTOSCOPY performed by [facility]an MD [facility] 8/21/2020    Suan Iraheta performed by Norma Rowan MD at [address] [facility], DIAGNOSTIC      FOOT DEBRIDEMENT Right 07/01/2016    I & D    INCISION AND DRAINAGE Right 2/18/2019    I&D RIGHT STUMP performed by Jahaira Valle MD at [address] Right 07/20/2016    LEG AMPUTATION BELOW KNEE Right 4/4/2019    I&D AND REVISION OF AMPUTATION RIGHT LEG performed by Jahaira Valle MD at [address] [facility] Right 1/14/15    sole of foot I&D    VT DRAIN INFECT SHOULDER BURSA Left 8/18/2017    LEFT SHOULDER INCISION AND DRAINAGE performed by Jahaira Valle MD at [address] [facility] OFFICE/OUTPT [facility] Right 9/20/2018    EXCISIONAL DEBRIDEMENT RIGHT BKA STUMP performed by Blake Collins MD at [place] [address] Right 1/16/15    2nd toe with wound vac applied    UPPER GASTROINTESTINAL ENDOSCOPY N/A 3/3/2020    EGD DILATION SAVORY performed by Connie Peterson MD at [address] [facility]  ? when     Family History   Problem Relation Age of Onset    Diabetes Mother     Other Mother         pneumonia, H1N1    Depression Mother     Early Death Mother     High Blood Pressure Mother     High Cholesterol Mother  Vision Loss Maternal Grandmother     Arthritis Maternal Grandfather     Heart Disease Maternal Grandfather      Outpatient Medications Marked as Taking for the 9/14/20 encounter (Office Visit) with Arthur Pinto MD   Medication Sig Dispense Refill    blood glucose monitor kit and supplies Accu Chek Sheila meter. Use to test blood sugars DX:E11.65 1 kit 0    blood glucose monitor strips Accucheck Sheila Test Strips Test blood sugars 4 times daily DX:E11.65 400 strip 5    Lancets MISC Use to test blood sugars 4 times daily DX:E11.65 400 each 5    sertraline (ZOLOFT) 50 MG tablet Take 1 tablet by mouth daily 30 tablet 5    gabapentin (NEURONTIN) 300 MG capsule Take 1 capsule by mouth 3 times daily for 60 days. 90 capsule 1    insulin lispro, 1 Unit Dial, (HUMALOG KWIKPEN) 100 UNIT/ML SOPN Inject 25 Units into the skin 3 times daily (before meals) 5 pen 5    loperamide (RA ANTI-DIARRHEAL) 2 MG capsule Take 1 capsule by mouth 4 times daily as needed for Diarrhea Take 1 capsule after every bowel movement, maximum of 4 capsules per day. Discontinue as diarrhea improves.  20 capsule 0    pantoprazole (PROTONIX) 20 MG tablet Take 1 tablet by mouth every morning (before breakfast) 30 tablet 3    ondansetron (ZOFRAN ODT) 4 MG disintegrating tablet Take 1 tablet by mouth every 8 hours as needed for Nausea or Vomiting 20 tablet 0    oxybutynin (DITROPAN) 5 MG tablet Take 1 tablet by mouth 3 times daily 20 tablet 0    tamsulosin (FLOMAX) 0.4 MG capsule Take 1 capsule by mouth daily Take one capsule daily to facilitate passage of ureteral stone 30 capsule 6    levothyroxine (SYNTHROID) 50 MCG tablet Take 1 tablet by mouth daily 30 tablet 5    metoprolol tartrate (LOPRESSOR) 25 MG tablet Take 1 tablet by mouth 2 times daily 60 tablet 0    lisinopril (PRINIVIL;ZESTRIL) 5 MG tablet Take 1 tablet by mouth daily 30 tablet 0    insulin detemir (LEVEMIR FLEXTOUCH) 100 UNIT/ML injection pen Inject 85 Units into the skin 2 times daily (Patient taking differently: Inject 55 Units into the skin 2 times daily ) 30 pen 5       Pcn [penicillins]; Actos [pioglitazone]; Clindamycin/lincomycin; and Vancomycin  Social History     Tobacco Use   Smoking Status Current Some Day Smoker    Packs/day: 1.00    Years: 10.00    Pack years: 10.00    Types: Cigars    Start date: 1985    Last attempt to quit: 2000    Years since quittin.0   Smokeless Tobacco Never Used       Social History     Substance and Sexual Activity   Alcohol Use Not Currently    Alcohol/week: 0.0 standard drinks       REVIEW OF SYSTEMS:  Constitutional: negative  Eyes: negative  Respiratory: negative  Cardiovascular: negative  Gastrointestinal: negative  Musculoskeletal: negative  Genitourinary: negative  Skin: negative   Neurological: negative  Hematological/Lymphatic: negative  Psychological: negative    Physical Exam:    This a 44 y.o. male   Vitals:    20 1431   Temp: 97.4 °F (36.3 °C)     Constitutional: Patient in no acute distress   Neuro: alert and oriented to person place and time. Psych: Mood and affect normal.  Head: atraumatic normocephalic  Eyes: EOMi  HEENT: neck supple, trachea midline  Lungs: Respiratory effort normal  Cardiovascular:  Normal peripheral pulses  Abdomen: Soft, non-tender, non-distended, No CVA  Bladder: non-tender and not distended. FROMx4, no cyanosis clubbing edema  Skin: warm and dry      Assessment and Plan      1. BPH with urinary obstruction           Plan:      No follow-ups on file.   Follow up 3-6 months with PVR  Continue timed voiding  Credee maneuver

## 2020-09-15 ENCOUNTER — HOSPITAL ENCOUNTER (OUTPATIENT)
Dept: NON INVASIVE DIAGNOSTICS | Age: 50
Discharge: HOME | End: 2020-09-15
Payer: MEDICARE

## 2020-09-15 LAB
LV EF: 60 %
LVEF MODALITY: NORMAL

## 2020-09-15 PROCEDURE — 93306 TTE W/DOPPLER COMPLETE: CPT

## 2020-09-17 ENCOUNTER — TELEPHONE (OUTPATIENT)
Dept: INTERNAL MEDICINE CLINIC | Age: 50
End: 2020-09-17

## 2020-09-17 ENCOUNTER — OFFICE VISIT (OUTPATIENT)
Dept: INTERNAL MEDICINE CLINIC | Age: 50
End: 2020-09-17
Payer: MEDICARE

## 2020-09-17 VITALS
BODY MASS INDEX: 34.07 KG/M2 | DIASTOLIC BLOOD PRESSURE: 70 MMHG | WEIGHT: 212 LBS | SYSTOLIC BLOOD PRESSURE: 130 MMHG | RESPIRATION RATE: 14 BRPM | OXYGEN SATURATION: 97 % | HEIGHT: 66 IN | TEMPERATURE: 97.8 F | HEART RATE: 105 BPM

## 2020-09-17 PROCEDURE — G8417 CALC BMI ABV UP PARAM F/U: HCPCS | Performed by: NURSE PRACTITIONER

## 2020-09-17 PROCEDURE — 3017F COLORECTAL CA SCREEN DOC REV: CPT | Performed by: NURSE PRACTITIONER

## 2020-09-17 PROCEDURE — 3046F HEMOGLOBIN A1C LEVEL >9.0%: CPT | Performed by: NURSE PRACTITIONER

## 2020-09-17 PROCEDURE — 2022F DILAT RTA XM EVC RTNOPTHY: CPT | Performed by: NURSE PRACTITIONER

## 2020-09-17 PROCEDURE — G8427 DOCREV CUR MEDS BY ELIG CLIN: HCPCS | Performed by: NURSE PRACTITIONER

## 2020-09-17 PROCEDURE — 1036F TOBACCO NON-USER: CPT | Performed by: NURSE PRACTITIONER

## 2020-09-17 PROCEDURE — 99214 OFFICE O/P EST MOD 30 MIN: CPT | Performed by: NURSE PRACTITIONER

## 2020-09-17 PROCEDURE — 1111F DSCHRG MED/CURRENT MED MERGE: CPT | Performed by: NURSE PRACTITIONER

## 2020-09-17 RX ORDER — INSULIN LISPRO 100 [IU]/ML
30 INJECTION, SOLUTION INTRAVENOUS; SUBCUTANEOUS
Qty: 5 PEN | Refills: 5 | Status: SHIPPED | OUTPATIENT
Start: 2020-09-17 | End: 2020-10-13 | Stop reason: SDUPTHER

## 2020-09-17 NOTE — PATIENT INSTRUCTIONS
Increase Novolog 30 units three times daily  Carb portion control. Get your scopes.    See cardiology  Keep appt with Ralph Vega

## 2020-09-17 NOTE — PROGRESS NOTES
Post-Discharge Transitional Care Management Services or Hospital Follow Up      Dora Soler   YOB: 1970    Date of Office Visit:  9/17/2020  Date of Hospital Admission: 9/10/20  Date of Hospital Discharge: 9/10/20  Readmission Risk Score(high >=14%. Medium >=10%):Readmission Risk Score: 52      Care management risk score Rising risk (score 2-5) and Complex Care (Scores >=6): 14     Non face to face  following discharge, date last encounter closed (first attempt may have been earlier): *No documented post hospital discharge outreach found in the last 14 days *No documented post hospital discharge outreach found in the last 14 days    Call initiated 2 business days of discharge: *No response recorded in the last 14 days     Patient Active Problem List   Diagnosis    Status post below knee amputation of right lower extremity ([facility] [address]    Gait disturbance    Sebaceous cyst    Uncontrolled type 2 diabetes mellitus with hyperglycemia, with long-term current use of insulin ([facility])    Severe bipolar II disorder, recent episode major depressive, remission ([address]    Bipolar 1 disorder (HCC)    Leukocytosis    Lactic acidosis    Hyponatremia    Normocytic anemia    Obesity (BMI 30-39. 9)    History of noncompliance with medical treatment    Essential hypertension    Tobacco dependence    Gastroesophageal reflux disease without esophagitis    History of marijuana use    Physical debility    Anemia    Electrolyte imbalance    Type 2 diabetes mellitus with hyperglycemia, with long-term current use of insulin (HCC)    Weakness    Infection of above knee amputation stump of right leg (HCC)    Above knee amputation of right lower extremity (HCC)    Sepsis (HCC)    Vitamin D deficiency    COPD exacerbation (HCC)    Influenza B    Depression, recurrent (HCC)    Major depressive disorder, recurrent (HCC)    GI bleed    Elevated lipase    Other psychoactive substance abuse, uncomplicated ([facility] [address]    Calculus of gallbladder without cholecystitis without obstruction    Right upper quadrant abdominal pain    Pedal edema    MURALI (obstructive sleep apnea)    Shortness of breath    Hypothyroid    Anxiety and depression    Dyspnea    Sinus tachycardia    Metabolic acidosis    Traumatic open wound of left lower leg with delayed healing    Edema of left lower extremity    SOB (shortness of breath) on exertion    Intermittent palpitations    History of chest pain    Acute leg pain, left       Allergies   Allergen Reactions    Pcn [Penicillins] Shortness Of Breath, Nausea And Vomiting and Other (See Comments)     Does not remember reactions. Has TOLERATED amoxicillin and several different cephalosporins.  Actos [Pioglitazone] Swelling    Clindamycin/Lincomycin Itching    Vancomycin Hives      Rash, with erythematous plaques to abdomen, shoulders, and proximal upper extremities with pruritis, persisted with 2nd dose administered slower. Medications listed as ordered at the time of discharge from [address] Medication Instructions TRICE:    Printed on:10/02/20 8336   Medication Information                      AMINO ACIDS COMPLEX PO  Take 1 each by mouth daily Daily AA drink             blood glucose monitor kit and supplies  Accu Chek Sheila meter. Use to test blood sugars DX:E11.65             blood glucose monitor strips  Accucheck Sheila Test Strips Test blood sugars 4 times daily DX:E11.65             gabapentin (NEURONTIN) 300 MG capsule  Take 1 capsule by mouth 3 times daily for 60 days.              Insulin Glargine, 2 Unit Dial, 300 UNIT/ML SOPN  Inject 86 Units into the skin 2 times daily             insulin lispro, 1 Unit Dial, (HUMALOG KWIKPEN) 100 UNIT/ML SOPN  Inject 30 Units into the skin 3 times daily (before meals)             Insulin Syringe-Needle U-100 29G X 1/2\" 0.3 ML MISC  1 each by Does not apply route 4 times daily (after meals and at bedtime)             Lancets MISC  Use to test blood sugars 4 times daily DX:E11.65             levothyroxine (SYNTHROID) 50 MCG tablet  Take 1 tablet by mouth daily             lisinopril (PRINIVIL;ZESTRIL) 5 MG tablet  Take 1 tablet by mouth daily             metoprolol tartrate (LOPRESSOR) 50 MG tablet  Take 1 tablet by mouth 2 times daily             ondansetron (ZOFRAN ODT) 4 MG disintegrating tablet  Take 1 tablet by mouth every 8 hours as needed for Nausea or Vomiting             oxybutynin (DITROPAN) 5 MG tablet  Take 1 tablet by mouth 3 times daily             pantoprazole (PROTONIX) 20 MG tablet  Take 1 tablet by mouth every morning (before breakfast)             sertraline (ZOLOFT) 50 MG tablet  Take 1 tablet by mouth daily             tamsulosin (FLOMAX) 0.4 MG capsule  Take 1 capsule by mouth daily Take one capsule daily to facilitate passage of ureteral stone                   Medications marked \"taking\" at this time  Outpatient Medications Marked as Taking for the 20 encounter (Office Visit) with MARILYN Taveras - CNP   Medication Sig Dispense Refill    Insulin Glargine, 2 Unit Dial, 300 UNIT/ML SOPN Inject 86 Units into the skin 2 times daily 5 pen 5    insulin lispro, 1 Unit Dial, (HUMALOG KWIKPEN) 100 UNIT/ML SOPN Inject 30 Units into the skin 3 times daily (before meals) 5 pen 5    blood glucose monitor kit and supplies Accu Chek Sheila meter. Use to test blood sugars DX:E11.65 1 kit 0    blood glucose monitor strips Accucheck Sheila Test Strips Test blood sugars 4 times daily DX:E11.65 400 strip 5    Lancets MISC Use to test blood sugars 4 times daily DX:E11.65 400 each 5    sertraline (ZOLOFT) 50 MG tablet Take 1 tablet by mouth daily 30 tablet 5    gabapentin (NEURONTIN) 300 MG capsule Take 1 capsule by mouth 3 times daily for 60 days.  90 capsule 1    [] loperamide (RA ANTI-DIARRHEAL) 2 MG capsule Take 1 capsule by mouth 4 times daily as needed for Diarrhea Take 1 capsule after every bowel movement, maximum of 4 capsules per day. Discontinue as diarrhea improves. 20 capsule 0    pantoprazole (PROTONIX) 20 MG tablet Take 1 tablet by mouth every morning (before breakfast) 30 tablet 3    ondansetron (ZOFRAN ODT) 4 MG disintegrating tablet Take 1 tablet by mouth every 8 hours as needed for Nausea or Vomiting 20 tablet 0    oxybutynin (DITROPAN) 5 MG tablet Take 1 tablet by mouth 3 times daily 20 tablet 0    tamsulosin (FLOMAX) 0.4 MG capsule Take 1 capsule by mouth daily Take one capsule daily to facilitate passage of ureteral stone 30 capsule 6    levothyroxine (SYNTHROID) 50 MCG tablet Take 1 tablet by mouth daily 30 tablet 5    [DISCONTINUED] metoprolol tartrate (LOPRESSOR) 25 MG tablet Take 1 tablet by mouth 2 times daily 60 tablet 0    lisinopril (PRINIVIL;ZESTRIL) 5 MG tablet Take 1 tablet by mouth daily 30 tablet 0    AMINO ACIDS COMPLEX PO Take 1 each by mouth daily Daily AA drink      Insulin Syringe-Needle U-100 29G X 1/2\" 0.3 ML MISC 1 each by Does not apply route 4 times daily (after meals and at bedtime) 200 each 0        Medications patient taking as of now reconciled against medications ordered at time of hospital discharge: Yes    Chief Complaint   Patient presents with    Follow-Up from [address] [place] [address]    Inpatient course: Discharge summary reviewed- see chart. ED visit 9/10/2020 as follows:   [facility]   Pt Name: Natacha Gallegos   MRN: [id number]   [place] 1970   Date of evaluation: 9/10/2020   Physician: Cecil Martinez MD, Andreas Schaeffer          Chief Complaint   Patient presents with    Diarrhea   History obtained from chart review and the patient. HISTORY OF PRESENT ILLNESS    HPI   Natacha Gallegos is a 44 y.o. male who presents to the emergency department for evaluation of diarrhea.  Patient states for the last 1 month has had one episode of diarrhea after every time he eats, just minutes after eating. Patient states he has occasional nausea, no vomiting and no abdominal pain. He has been to his primary care doctor and after going multiple times he was advised to come to the emergency department for evaluation. Patient states diarrhea is described as soft stool, brown color that has been becoming green in the last few days. Denies sick contacts, denies different changes in diet, denies recent travel. [place] fever. The patient has no other acute complaints at this time. REVIEW OF SYSTEMS   Review of Systems   Constitutional: Negative for chills, fever and unexpected weight change. HENT: Negative for congestion, ear pain, nosebleeds, sinus pressure and sinus pain. Eyes: Negative for pain. Respiratory: Negative for cough, chest tightness and shortness of breath. Cardiovascular: Negative for chest pain. Gastrointestinal: Positive for diarrhea and nausea. Negative for abdominal pain, blood in stool, constipation, rectal pain and vomiting. Endocrine: Negative for polydipsia and polyuria. Genitourinary: Negative for dysuria and flank pain. Musculoskeletal: Negative for arthralgias, back pain, myalgias and neck pain. Skin: Negative for rash. Neurological: Negative for tremors, seizures, weakness and headaches. All other systems reviewed and are negative. PREVIOUS RECORDS   Previous records reviewed: Patient was seen in the emergency department on September 2, 2020 for shortness of breath, he did mention nausea vomiting and diarrhea at that time. PHYSICAL EXAM               ED Triage Vitals   BP Temp Temp Source Pulse Resp SpO2 Height Weight   09/10/20 1432 09/10/20 1432 09/10/20 1432 09/10/20 1432 09/10/20 1432 09/10/20 1432 09/10/20 1442 09/10/20 1442   (!) 143/109 98.1 °F (36.7 °C) Oral 113 17 98 % 5' 6\" (1.676 m) 200 lb (90.7 kg)   Initial vital signs and nursing assessment reviewed and abnormal from Hypertension. Pulsoximetry is normal per my interpretation. Additional Vital Signs:       Vitals:    09/10/20 1822   BP: (!) 154/89   Pulse: 90   Resp: 16   Temp:    SpO2: 98%   Physical Exam   Vitals signs and nursing note reviewed. Constitutional:   General: He is not in acute distress. Appearance: He is well-developed. HENT:   Head: Normocephalic and atraumatic. Right Ear: External ear normal.   Left Ear: External ear normal.   Nose: Nose normal.   Mouth/Throat:   Mouth: Mucous membranes are dry. Eyes:   Conjunctiva/sclera: Conjunctivae normal.   Pupils: Pupils are equal, round, and reactive to light. Neck:   Musculoskeletal: Neck supple. Cardiovascular:   Rate and Rhythm: Normal rate and regular rhythm. Heart sounds: Normal heart sounds. No murmur. No friction rub. No gallop. Pulmonary:   Effort: Pulmonary effort is normal. No respiratory distress. Breath sounds: Normal breath sounds. No stridor. No wheezing or rales. Abdominal:   General: Abdomen is flat. There is no distension. Palpations: Abdomen is soft. There is no mass. Tenderness: There is no abdominal tenderness. There is no guarding or rebound. Hernia: No hernia is present. Musculoskeletal:   Comments: AKA of the right lower extremity. Skin:   General: Skin is warm and dry. Comments: There is a healing ulcer without signs of infection on left anterior shin. Neurological:   Mental Status: He is alert and oriented to person, place, and time. Cranial Nerves: No cranial nerve deficit. Psychiatric:   Behavior: Behavior normal.     MEDICAL DECISION MAKING   Initial Assessment: Chronic diarrhea, pending GI appointment on September 16. Previous abnormal lab work. Mild dehydration. Plan: I V line, labs, IV fluids, imaging, observation.    ED RESULTS   Laboratory results:         Labs Reviewed   CBC WITH AUTO DIFFERENTIAL - Abnormal; Notable for the following components:   Result Value     RDW-CV 14.6 (*)     RDW-SD 47.7 (*) Eosinophils Absolute 0.5 (*)     Immature Grans (Abs) 0.11 (*)     All other components within normal limits   COMPREHENSIVE METABOLIC PANEL W/ REFLEX TO MG FOR LOW K - Abnormal; Notable for the following components:    Glucose 385 (*)     Chloride 96 (*)     Total Protein 8.5 (*)     All other components within normal limits   LIPASE - Abnormal; Notable for the following components:    Lipase 89.0 (*)     All other components within normal limits   LACTIC ACID, PLASMA - Abnormal; Notable for the following components:    Lactic Acid 3.6 (*)     All other components within normal limits   URINALYSIS - Abnormal; Notable for the following components:    Glucose, Urine >= 1000 (*)     Specific Gravity, UA >1.030 (*)     All other components within normal limits   CULTURE, STOOL   GIARDIA ANTIGEN   BLOOD OCCULT STOOL SCREEN #1   ANION GAP   OSMOLALITY   GLOMERULAR FILTRATION RATE, ESTIMATED   ROTAVIRUS ANTIGEN, STOOL   Radiologic studies results:   CT ABDOMEN PELVIS W IV CONTRAST Additional Contrast? None   Final Result   1. No acute intra-abdominal or pelvic findings. 2. Diffuse fatty infiltration of the liver is demonstrated. **This report has been created using voice recognition software. It may contain minor errors which are inherent in voice recognition technology. **      Final report electronically signed by Dr. Magdy Renee on 9/10/2020 4:56 PM      ED Medications administered this visit:   Medications   0.9 % sodium chloride bolus (0 mLs Intravenous Stopped 9/10/20 1601)   0.9 % sodium chloride bolus (0 mLs Intravenous Stopped 9/10/20 1822)   dicyclomine (BENTYL) injection 20 mg (20 mg Intramuscular Given 9/10/20 1624)   iopamidol (ISOVUE-370) 76 % injection 80 mL (80 mLs Intravenous Given 9/10/20 1645)     ED COURSE   Strict return precautions and follow up instructions were discussed with the patient prior to discharge, with which the patient agrees.    MEDICATION CHANGES          New Prescriptions LOPERAMIDE (RA ANTI-DIARRHEAL) 2 MG CAPSULE Take 1 capsule by mouth 4 times daily as needed for Diarrhea Take 1 capsule after every bowel movement, maximum of 4 capsules per day. Discontinue as diarrhea improves. FINAL DISPOSITION     Final diagnoses:   Functional diarrhea   Dehydration   Condition: condition: good   Dispo: Discharge to home      Interval history/Current status: Pt has been continuing to have intermittent diarrhea, states this occurs after every meal. He had called into the office with concerns of black stool, and was advised to go to the ED. He went to the ED, and was felt to be dehydrated due to diarrhea, given Immodium. This has been an long ongoing problem, he was supposed to get stool studies, have set him up with GI in the past. He has had his gallbladder out. He saw Dr. David Lopez yesterday, having EGD on 28th and colonoscopy on 29th. DM - Last night glucose read \"hi\". Was 300's. Went up to 400's. States he didn't eat. Took his coverage. This am was 232. On Levemir 85 units twice daily, Novolog 25 units three times daily. Will switch to higher dose insulin. Reports he took 50 units Novolog this am for HI result. I have tried to refer him to endocrinology in the past, he has been discharged from Dr. Audrey Dsouza office previously and he has ride issues getting out of town for appointments. He has failed Actos due to swelling, Metformin due to lactic acidosis, Tradjenta and now Trulicity due to elevated lipase and abdominal pain, diarrhea. He recently had a bladder surgery as well that he is following up with Dr. Marycruz Johnson for, therefore at the time being I am not starting an SGLT2. Has an appt with [facility] 10[facility]7. Sees Dr. Bo Villavicencio tomorrow for a wound on the shin. I cleansed this with betadine this last time as it was a dry scab and gave him non stick pads to use to keep it clean when up, but he states today he removed the pad and the top layer peeled off. No fevers, no chills. Lactic acidosis - Believe this maybe due to diabetic non compliance, as when he is hospitalized his glucose tends to be fairly well controlled with his current medications, much better than as outpatient. See results from 6/2020 - when he actually was hypoglycemic during his stay. He has a chronically elevated sed rate, range 24-48, and lactic acid that has varied from 2.2-3.9 without signs of infection. He is S/P right AKA. Blood cultures have been no growth, most recent 8/22/2020. Stool culture negative. No longer on Metformin. Denies alcohol or drug use. Not on aspirin. Renal function intact. Echo with normal EF 98% - gr 1 diastolic dysfunction. Had a small pericardial effusion at one point, but not present on most recent echo 9/15/2020. Hypothyroidism - Denies thyroid s/s including dysphagia, dysphonia, brittle hair/nails, gritty dry eyes, hot/cold intolerance. Reports chronic fatigue. Last TSH 2.130 6/18/2020 - due for recheck, will send labs. Review of Systems   Constitutional: Positive for fatigue. Negative for chills and fever. HENT: Negative for sore throat and trouble swallowing. Eyes: Negative for visual disturbance. Respiratory: Negative for cough and shortness of breath. Cardiovascular: Negative for chest pain and leg swelling. Gastrointestinal: Negative for abdominal pain, constipation, diarrhea, nausea and vomiting. Genitourinary: Negative for difficulty urinating. Musculoskeletal: Negative for arthralgias and myalgias. S/P right AKA, reports wound left shin   Skin: Negative for rash and wound. Neurological: Negative for numbness. Vitals:    09/17/20 1005   BP: 130/70   Site: Right Upper Arm   Position: Sitting   Cuff Size: Large Adult   Pulse: 105   Resp: 14   Temp: 97.8 °F (36.6 °C)   TempSrc: Temporal   SpO2: 97%   Weight: 212 lb (96.2 kg)   Height: 5' 6\" (1.676 m)     Body mass index is 34.22 kg/m².    Wt Readings from Last 3 Encounters:   09/17/20 212 lb (96.2 kg)   09/14/20 212 lb (96.2 kg)   09/10/20 200 lb (90.7 kg)     BP Readings from Last 3 Encounters:   10/02/20 (!) 147/76   09/25/20 (!) 150/85   09/18/20 (!) 154/78       Physical Exam  Vitals signs reviewed. Constitutional:       General: He is not in acute distress. Appearance: He is well-developed. He is not diaphoretic. HENT:      Head: Normocephalic and atraumatic. Mouth/Throat:      Pharynx: No oropharyngeal exudate. Neck:      Musculoskeletal: Normal range of motion and neck supple. Thyroid: No thyromegaly. Cardiovascular:      Rate and Rhythm: Normal rate and regular rhythm. Heart sounds: Normal heart sounds. No murmur. No friction rub. No gallop. Pulmonary:      Effort: Pulmonary effort is normal. No respiratory distress. Breath sounds: Normal breath sounds. No wheezing or rales. Abdominal:      General: There is no distension. Palpations: Abdomen is soft. Tenderness: There is no abdominal tenderness. Musculoskeletal: Normal range of motion. General: No tenderness. Comments: S/P right AKA, left shin wound as pictured, not warm, some swelling, with scabbing and slough, no obvious drainage or infection. Lymphadenopathy:      Cervical: No cervical adenopathy. Skin:     General: Skin is warm and dry. Coloration: Skin is not pale. Findings: No erythema or rash. Neurological:      Mental Status: He is alert and oriented to person, place, and time. Assessment/Plan:  1. Diarrhea  Chronic, occurs with eating  All medications that could be precipitating this has been stopped  His gall bladder was removed recently - related? Appears well hydrated today. See GI as scheduled for follow up. Continue Immodium PRN. 2. Uncontrolled type 2 diabetes mellitus with hyperglycemia, with long-term current use of insulin (HCC)  Discussed diet at length with pt, in addition to medication compliance.    Pt reports often

## 2020-09-17 NOTE — TELEPHONE ENCOUNTER
Needs a letter of medical necessity for his rides (cannot remember hte company this was for). Let him know I sent in [employer] U-300 insulin - it's a 2 unit dial so will be 86 units twice daily instead of 85 units twice daily. I also sent in his Humalog Rx to [facility] as requested.

## 2020-09-18 ENCOUNTER — HOSPITAL ENCOUNTER (OUTPATIENT)
Dept: WOUND CARE | Age: 50
Discharge: HOME | End: 2020-09-18
Payer: MEDICARE

## 2020-09-18 VITALS
SYSTOLIC BLOOD PRESSURE: 154 MMHG | DIASTOLIC BLOOD PRESSURE: 78 MMHG | OXYGEN SATURATION: 99 % | HEART RATE: 112 BPM | TEMPERATURE: 99.1 F | RESPIRATION RATE: 16 BRPM

## 2020-09-18 PROBLEM — L03.119 CELLULITIS OF HAND: Status: RESOLVED | Noted: 2020-05-12 | Resolved: 2020-09-18

## 2020-09-18 PROBLEM — L89.890: Status: RESOLVED | Noted: 2020-03-12 | Resolved: 2020-09-18

## 2020-09-18 PROBLEM — L97.909 DIABETIC ULCER OF LOWER EXTREMITY (HCC): Status: RESOLVED | Noted: 2020-07-01 | Resolved: 2020-09-18

## 2020-09-18 PROBLEM — B95.0 STREPTOCOCCUS INFECTION, GROUP A: Status: RESOLVED | Noted: 2020-07-01 | Resolved: 2020-09-18

## 2020-09-18 PROBLEM — S81.802D TRAUMATIC OPEN WOUND OF LEFT LOWER LEG WITH DELAYED HEALING: Status: ACTIVE | Noted: 2020-09-18

## 2020-09-18 PROBLEM — L98.499 ULCER, SKIN, NON-HEALING (HCC): Status: RESOLVED | Noted: 2020-07-01 | Resolved: 2020-09-18

## 2020-09-18 PROBLEM — R60.0 EDEMA OF LEFT LOWER EXTREMITY: Chronic | Status: ACTIVE | Noted: 2020-09-18

## 2020-09-18 PROBLEM — E11.622 DIABETIC ULCER OF LOWER EXTREMITY (HCC): Status: RESOLVED | Noted: 2020-07-01 | Resolved: 2020-09-18

## 2020-09-18 PROCEDURE — 97597 DBRDMT OPN WND 1ST 20 CM/<: CPT

## 2020-09-18 PROCEDURE — 97597 DBRDMT OPN WND 1ST 20 CM/<: CPT | Performed by: NURSE PRACTITIONER

## 2020-09-18 PROCEDURE — 6370000000 HC RX 637 (ALT 250 FOR IP): Performed by: NURSE PRACTITIONER

## 2020-09-18 RX ORDER — LIDOCAINE HYDROCHLORIDE 20 MG/ML
JELLY TOPICAL ONCE
Status: COMPLETED | OUTPATIENT
Start: 2020-09-18 | End: 2020-09-18

## 2020-09-18 RX ADMIN — LIDOCAINE HYDROCHLORIDE: 20 JELLY TOPICAL at 10:45

## 2020-09-18 ASSESSMENT — PAIN DESCRIPTION - LOCATION: LOCATION: GENERALIZED

## 2020-09-18 ASSESSMENT — PAIN SCALES - GENERAL: PAINLEVEL_OUTOF10: 10

## 2020-09-18 ASSESSMENT — PAIN DESCRIPTION - PAIN TYPE: TYPE: CHRONIC PAIN

## 2020-09-18 NOTE — PROGRESS NOTES
[address] f: [phone] f: [phone] p: [phone] [email or web address]      Ordering Center:     [facility] [address] [place] [id number]  [phone]  WOUND CARE Dept: [phone]   FAX NUMBER [phone]    Patient Information:      Ana Rosa Bailey  [address] [place] [address]  [address] [place] [address] [address] [id number]   : 1970  AGE: 44 y.o. GENDER: male   EPISODE DATE: 2020    Insurance:      PRIMARY INSURANCE:  Plan: Mateo Camacho COMPLETE  Coverage: [facility] MEDICARE  Effective Date: 2020  Group Number: [unfilled]  Subscriber Number: [id number] - (Medicare Managed)    SECONDARY INSURANCE:  Plan:  [employer] DEPT OF JOB  Coverage: [place]ICAID [place]  Effective Date: 2019  [unfilled]    [unfilled]   [unfilled]     Patient Wound Information:      Problem List Items Addressed This Visit     Edema of left lower extremity (Chronic)    * (Principal) Traumatic open wound of left lower leg with delayed healing          WOUNDS REQUIRING DRESSING SUPPLIES:     Wound 20 Leg Left (Active)   Wound Image   20 1053   Wound Traumatic 20 1044   Offloading for Diabetic Foot Ulcers Wheelchair 20 1044   Dressing Status Intact; Old drainage; Changed 20 1044   Dressing Changed Changed/New 20 1044   Dressing/Treatment Alginate with Ag;ABD;Coban;Dry dressing 20 1044   Wound Cleansed Rinsed/Irrigated with saline 20 1044   Wound Length (cm) 2 cm 20 1044   Wound Width (cm) 2 cm 20 1044   Wound Depth (cm) 0 cm 20 1044   Wound Surface Area (cm^2) 4 cm^2 20 1044   Wound Volume (cm^3) 0 cm^3 20 1044   Wound Assessment Brown;Black 20 1044   Drainage Amount Small 20 1044   Drainage Description Serosanguinous 20 1044   Odor None 20 1044   Margins Attached edges 20 1044   Louise-wound Assessment Dry;Red 09/18/20 1044   Non-staged Wound Description Full thickness 09/18/20 1044   Black%Wound Bed 5 09/18/20 1044   Other%Wound Bed 95 brown  09/18/20 1044   Number of days: 0          Supplies Requested :      WOUND #: 1   PRIMARY DRESSING:  Alginate with silver pad   Cover and Secure with: 4X4 gauze pad  ABD pad  Conforming roll gauze  Coban     FREQUENCY OF DRESSING CHANGES:  Three times per week           ADDITIONAL ITEMS:  [] Gloves Small  [x] Gloves Medium [] Gloves Large [] Gloves XLarge  [] Tape 1\" [x] Tape 2\" [] Tape 3\"  [x] Medipore Tape  [x] Saline  [] Skin Prep   [] Adhesive Remover   [] Cotton Tip Applicators   [] Other:    Patient Wound(s) Debrided: [x] Yes if yes please add date 09/18/2020   [] No    Debribement Type: Non-excisional/Selective    Patient currently being seen by Home Health: [] Yes   [x] No    Duration for needed supplies:  []15  [x]30  []60  []90 Days    Electronically signed by Tony Onofre RN on 9/18/2020 at 2:07 PM     Provider Information:      PROVIDER'S NAME: Emiliano Cardona CNP     NPI: [id number]

## 2020-09-18 NOTE — PROGRESS NOTES
[address] [place] [address] and Procedure Note      [facility] RECORD NUMBER:  [id number]  AGE: 44 y.o. GENDER: male  : 1970  EPISODE DATE:  2020    SUBJECTIVE:     Chief Complaint   Patient presents with    Wound Check     left leg         HISTORY OF PRESENT ILLNESS      Corwin Fernández is a 44 y.o. male who presents today for evaluation of right and left index finger wounds and skin tear to left leg. Zeus Lee was last seen at clinic for similar wounds on 2020. He was unable to make his follow up appointment. Clinic was unable to make contact with Zeus Lee on several attempts. Zeus Lee states that he is not concerned about finger wounds-does not want them addressed at today's visit. Zeus Lee reports that current wound to left shin began as a tear on 2020. Has been applying betadine and nonadherent gauze. Area had scabbed over but scab peeled off yesterday when he removed dressing. Zeus Lee reports purulent drainage from wound. Denies any pain to area. Has chronic fever, chills, night-sweats, diarrhea-currently followed by PCP and GI specialist.   Zo Sylvester blood glucose levels remain elevated,200-300,  was evaluated by PCP in office yesterday. PAST MEDICAL HISTORY             Diagnosis Date    Bipolar 2 disorder [facility])     previously followed with Dr. Wild Ellis and Azam Beltran in [facility] BPH (benign prostatic hyperplasia)     Diabetes mellitus type 2, uncontrolled ([address]     HgbA1c on 2019 was 9.1.     Diabetic peripheral neuropathy ([address]     Diabetic polyneuropathy ([address]     Diabetic ulcer of right foot associated with type 2 diabetes mellitus ([address] 12/10/2015    Essential hypertension     \"never been on b/p medication that I know of\"    GERD (gastroesophageal reflux disease)     Hammer toe of left foot     Heart murmur     denies any chest pain or palpitations    History of tobacco abuse     Hx of AKA (above knee amputation), right ([address] 04/18/2019    Dr. Mat Pierce edema, diabetic, bilateral ([facility] 05/04/2018    Dr. Lela Navas referred to retina specialist for 2nd opinion    Marijuana abuse in remission     Melena     MRSA (methicillin resistant staph aureus) culture positive     Onychomycosis     Other disorders of kidney and ureter in diseases classified elsewhere     Sleep apnea     no cpap    Thyroid disease     WD-Skin ulcer of fourth toe of right foot with necrosis of bone ([address] 6/29/2016       PAST SURGICAL HISTORY     Past Surgical History:   Procedure Laterality Date    ABSCESS DRAINAGE Right     foot    AMPUTATION ABOVE KNEE Right 4/18/2019    RIGHT ABOVE KNEE AMPUTATION performed by Judith Martinez MD at [address] [place] [address], LAPAROSCOPIC N/A 4/1/2020    ROBOTIC CHOLECYSTECTOMY performed by Willa Briones MD at [place] [address] N/A 7/20/2020    CYSTOSCOPY performed by Neo Cat MD at [address] [place] [address] 8/21/2020    Rosanna De Guzman performed by Neo Cat MD at [facility], DIAGNOSTIC      FOOT DEBRIDEMENT Right 07/01/2016    I & D    INCISION AND DRAINAGE Right 2/18/2019    I&D RIGHT STUMP performed by Judith Martinez MD at [address] [place] [address] Right 07/20/2016    LEG AMPUTATION BELOW KNEE Right 4/4/2019    I&D AND REVISION OF AMPUTATION RIGHT LEG performed by Judith Martinez MD at [place] [address] Right 1/14/15    sole of foot I&D    DE DRAIN INFECT SHOULDER BURSA Left 8/18/2017    LEFT SHOULDER INCISION AND DRAINAGE performed by Judith Martinez MD at [address] OFFICE/OUTPT [facility] Right 9/20/2018    EXCISIONAL DEBRIDEMENT RIGHT BKA STUMP performed by Delta Marroquin MD at [address] Right 1/16/15    2nd toe with wound vac applied    UPPER GASTROINTESTINAL ENDOSCOPY N/A 3/3/2020    EGD DILATION SAVORY performed by Claudine Selby MD at [address] ?when       FAMILY HISTORY     Family History   Problem Relation Age of Onset    Diabetes Mother     Other Mother         pneumonia, H1N1    Depression Mother     Early Death Mother     High Blood Pressure Mother     High Cholesterol Mother     Vision Loss Maternal Grandmother     Arthritis Maternal Grandfather     Heart Disease Maternal Grandfather        SOCIAL HISTORY     Social History     Tobacco Use    Smoking status: Current Some Day Smoker     Packs/day: 1.00     Years: 10.00     Pack years: 10.00     Types: Cigars     Start date: 1985     Last attempt to quit: 2000     Years since quittin.0    Smokeless tobacco: Never Used   Substance Use Topics    Alcohol use: Not Currently     Alcohol/week: 0.0 standard drinks    Drug use: No     Comment: 30 YEARS AGO       ALLERGIES     Allergies   Allergen Reactions    Pcn [Penicillins] Shortness Of Breath, Nausea And Vomiting and Other (See Comments)     Does not remember reactions. Has TOLERATED amoxicillin and several different cephalosporins.  Actos [Pioglitazone] Swelling    Clindamycin/Lincomycin Itching    Vancomycin Hives      Rash, with erythematous plaques to abdomen, shoulders, and proximal upper extremities with pruritis, persisted with 2nd dose administered slower. MEDICATIONS     Current Outpatient Medications on File Prior to Encounter   Medication Sig Dispense Refill    Insulin Glargine, 2 Unit Dial, 300 UNIT/ML SOPN Inject 86 Units into the skin 2 times daily 5 pen 5    insulin lispro, 1 Unit Dial, (HUMALOG KWIKPEN) 100 UNIT/ML SOPN Inject 30 Units into the skin 3 times daily (before meals) 5 pen 5    blood glucose monitor kit and supplies Accu Chek Sheila meter.  Use to test blood sugars DX:E11.65 1 kit 0    blood glucose monitor strips Accucheck Sheila Test Strips Test blood sugars 4 times daily DX:E11.65 400 strip 5    Lancets MISC Use to test blood sugars 4 times daily DX:E11.65 400 each 5    sertraline pruritis    PHYSICAL EXAM:     BP (!) 154/78   Pulse 112   Temp 99.1 °F (37.3 °C) (Tympanic)   Resp 16   SpO2 99%   Wt Readings from Last 3 Encounters:   09/17/20 212 lb (96.2 kg)   09/14/20 212 lb (96.2 kg)   09/10/20 200 lb (90.7 kg)       General:  Awake, alert, no apparent distress. Appears stated age  [de-identified]: conjuctivae are clear without exudate or hemorrhage, anicteric sclera, moist oral mucosa. Chest:  Respirations regular, non-labored. Chest rise and fall equal bilaterally. Neurological: Awake, alert, orientedx4  Psychiatric:  Appropriate mood and affect  Extremities: Right AKA. 2+ pitting edema to left lower extremity. Dependent rubor. Capillary refill greater than 3 seconds. Pedal and posterior tibial pulses +1. Skin:  Warm and dry    Wound:     Location:  L lower leg anterior   Classification/stage: traumatic   Tunneling/undermining: Not Present   Wound bed: 5% black, 95% brown. Fluctuant   Exudate:  moderate, purulent   Louise-wound:dry, cool  and edematous   Complications[de-identified] uncomplicated   Pain:None   -No surrounding erythema or warmth to the touch indicating infectious process. Wound 09/18/20 Leg Left (Active)   Wound Image   09/18/20 1044   Wound Traumatic 09/18/20 1044   Offloading for Diabetic Foot Ulcers Wheelchair 09/18/20 1044   Dressing Status Intact; Old drainage; Changed 09/18/20 1044   Dressing Changed Changed/New 09/18/20 1044   Wound Cleansed Rinsed/Irrigated with saline 09/18/20 1044   Wound Length (cm) 2 cm 09/18/20 1044   Wound Width (cm) 2 cm 09/18/20 1044   Wound Depth (cm) 0 cm 09/18/20 1044   Wound Surface Area (cm^2) 4 cm^2 09/18/20 1044   Wound Volume (cm^3) 0 cm^3 09/18/20 1044   Wound Assessment Brown;Black 09/18/20 1044   Drainage Amount Small 09/18/20 1044   Drainage Description Serosanguinous 09/18/20 1044   Odor None 09/18/20 1044   Margins Attached edges 09/18/20 1044   Louise-wound Assessment Dry;Red 09/18/20 1044   Black%Wound Bed 5 09/18/20 1041 Other%Wound Bed [facility]  09/18/20 1044   Number of days: 0         LABS     Micro:   Lab Results   Component Value Date    BC No growth-preliminary No growth  08/22/2020       ASSESSMENT          Patient Active Problem List   Diagnosis Code    Cellulitis L03.90    Status post below knee amputation of right lower extremity ([facility] Z89.511    Gait disturbance R26.9    Sebaceous cyst L72.3    Uncontrolled type 2 diabetes mellitus with hyperglycemia, with long-term current use of insulin ([facility]) E11.65, Z79.4    Severe bipolar II disorder, recent episode major depressive, remission ([facility]) F31.81    Bipolar 1 disorder ([facility]) F31.9    Leukocytosis D72.829    Lactic acidosis E87.2    Hyponatremia E87.1    Normocytic anemia D64.9    Obesity (BMI 30-39. 9) E66.9    History of noncompliance with medical treatment Z91.19    Essential hypertension I10    Tobacco dependence F17.200    Gastroesophageal reflux disease without esophagitis K21.9    History of marijuana use Z87.898    Physical debility R53.81    Anemia D64.9    Electrolyte imbalance E87.8    Type 2 diabetes mellitus with hyperglycemia, with long-term current use of insulin ([facility]) E11.65, Z79.4    Weakness R53.1    Infection of above knee amputation stump of right leg ([facility]) T87.43    Above knee amputation of right lower extremity ([facility]) A09.227X    Sepsis ([facility]) A41.9    Vitamin D deficiency E55.9    COPD exacerbation ([facility]) J44.1    Influenza B J10.1    Depression, recurrent ([facility]) F33.9    Major depressive disorder, recurrent ([facility]) F33.9    GI bleed K92.2    Elevated lipase R74.8    Pressure ulcer of left calf, unstageable ([facility]) L89.890    Other psychoactive substance abuse, uncomplicated ([facility]) W03.23    Calculus of gallbladder without cholecystitis without obstruction K80.20    Right upper quadrant abdominal pain R10.11    Cellulitis of hand L03.119    Pedal edema R60.0    MURALI (obstructive sleep apnea) G47.33    Shortness of breath R06.02    Hypothyroid E03.9    Anxiety and depression F41.9, F32.9    Dyspnea R06.00    Sinus tachycardia M23.5    Metabolic acidosis A97.0    Diabetic ulcer of lower extremity (HCC) E11.622, L97.909    Ulcer, skin, non-healing ([facility]CC) L98.499    Streptococcus infection, group A B95.0    Localized edema R60.0       PROCEDURE NOTE     Indications:  Based on my examination of this patient's wound(s)/ulcer(s) today, debridement is required to promote healing and evaluate the extent healing. Performed by: [address] [place] [address], [facility]    Consent obtained: Yes    Time out taken:  Yes    Pain control: lidocaine 2%    Debridement:Non-excisional Debridement    Using curette the wound(s)/ulcer(s) was/were sharply debrided down through and including the removal of epidermis and dermis. Devitalized Tissue Debrided:  biofilm, necrotic/eschar and exudate    Pre Debridement Measurements:  Are located in the Wound/Ulcer Documentation Flow Sheet    Wound/Ulcer #: 1    Post Debridement Measurements:    Wound/Ulcer Descriptions are listed under Physical Exam above. Wound/Ulcer Descriptions are Pre Debridement except measurements    Percent of Wound/Ulcer Debrided: 100%    Total Surface Area Debrided:  4 sq cm     Bleeding:  None    Hemostasis Achieved:  not needed    Procedural Pain:  0  / 10     Post Procedural Pain:  0 / 10     Response to treatment:  Well tolerated by patient. PLAN     Patient examined and evaluated    -Left shin traumatic wound with delayed healing- discussed the importance of good blood glucose control and compression in both healing and prevention of wounds. Wound debrided today, post debridement wound bed is red, free of any biofilm or debris. Edges of wound show granulation and signs of healing.      -Wound care ordered as follows:  left leg- Apply silver alginate to wound bed. Cover with ABD, kerlix and then coban.  Wrap to start at base of toes up to 1-2 inches below the bend of the knee. Change three times weekly.    -Elevate leg while at rest to help decrease edema. Do not leave in dependent position. Antibiotics: No, no indications of acute infectious process at this time. Discussed signs and symptoms of infection with Magali Corrales. Call clinic or seek emergency care if these should occur.      -Follow up 2 weeks for re-evaluation. Call clinic with any needs or concerns prior to scheduled visit. All questions and concerns addressed prior to discharge from today's visit. Please see attached Discharge Instructions    Written patient dismissal instructions given to patient and signed by patient or POA. Discharge Instructions         Discharge Instructions       Visit Discharge/Physician Orders:  - Debridement to left leg. - Will order supplies for you today. Will send to your house. Wound Location: left leg    Dressing orders:     1) Gather wound care supplies and arrange on clean table. 2) Wash your hands with soap and water or use alcohol based hand  for 20 seconds (sing \"Happy Birthday\" twice). 3) Cleanse wounds with normal saline or wound cleanser and gauze. Pat dry with clean gauze. 4) left leg- Apply silver alginate to wound bed. Cover with ABD, kerlix and then coban. Wrap to start at base of toes up to 1-2 inches below the bend of the knee. Change three times weekly. Keep all dressings clean & dry. Do not shower, take baths or get wound wet, unless otherwise instructed by your Wound Care doctor. Follow up visit:   2 Weeks Friday October 2nd at 10:00 am     Keep next scheduled appointment. Please give 24 hour notice if unable to keep appointment. [phone]    If you experience any of the following, please call the Wound Care Service during business hours: Monday through Friday 8:00 am - 4:30 pm  ([phone]).    *Increase in pain   *Temperature over 101   *Increase in drainage from your wound or a foul odor   *Uncontrolled swelling   *Need for compression bandage changes due to slippage, breakthrough drainage    If you need medical attention outside of business hours, please contact your Primary Care Doctor or go to the nearest emergency room.                    Electronically signed by MARILYN Carter CNP on 9/18/2020 at 11:39 AM

## 2020-09-18 NOTE — PLAN OF CARE
Problem: Wound:  Goal: Will show signs of wound healing; wound closure and no evidence of infection  Description: Will show signs of wound healing; wound closure and no evidence of infection  Outcome: Ongoing     Patient presents to wound clinic for left leg wound. No s/s of infection noted. See AVS for discharge instructions. Follow up visit:   2 Weeks Friday October 2nd at 10:00 am     Care plan reviewed with patient. Patient verbalizes understanding of the plan of care and contribute to goal setting.

## 2020-09-21 ASSESSMENT — ENCOUNTER SYMPTOMS
ABDOMINAL PAIN: 0
TROUBLE SWALLOWING: 0
NAUSEA: 0
SORE THROAT: 0
VOMITING: 0
SHORTNESS OF BREATH: 0
COUGH: 0
DIARRHEA: 0
CONSTIPATION: 0

## 2020-09-25 ENCOUNTER — OFFICE VISIT (OUTPATIENT)
Dept: CARDIOLOGY CLINIC | Age: 50
End: 2020-09-25
Payer: MEDICARE

## 2020-09-25 VITALS
DIASTOLIC BLOOD PRESSURE: 85 MMHG | BODY MASS INDEX: 34.22 KG/M2 | HEIGHT: 66 IN | HEART RATE: 105 BPM | SYSTOLIC BLOOD PRESSURE: 150 MMHG

## 2020-09-25 PROBLEM — R06.02 SOB (SHORTNESS OF BREATH) ON EXERTION: Status: ACTIVE | Noted: 2020-09-25

## 2020-09-25 PROBLEM — R00.2 INTERMITTENT PALPITATIONS: Status: ACTIVE | Noted: 2020-09-25

## 2020-09-25 PROBLEM — Z87.898 HISTORY OF CHEST PAIN: Status: ACTIVE | Noted: 2020-09-25

## 2020-09-25 PROCEDURE — G8427 DOCREV CUR MEDS BY ELIG CLIN: HCPCS | Performed by: INTERNAL MEDICINE

## 2020-09-25 PROCEDURE — 4004F PT TOBACCO SCREEN RCVD TLK: CPT | Performed by: INTERNAL MEDICINE

## 2020-09-25 PROCEDURE — 3017F COLORECTAL CA SCREEN DOC REV: CPT | Performed by: INTERNAL MEDICINE

## 2020-09-25 PROCEDURE — G8417 CALC BMI ABV UP PARAM F/U: HCPCS | Performed by: INTERNAL MEDICINE

## 2020-09-25 PROCEDURE — 99214 OFFICE O/P EST MOD 30 MIN: CPT | Performed by: INTERNAL MEDICINE

## 2020-09-25 RX ORDER — METOPROLOL TARTRATE 50 MG/1
50 TABLET, FILM COATED ORAL 2 TIMES DAILY
Qty: 60 TABLET | Refills: 3 | Status: SHIPPED | OUTPATIENT
Start: 2020-09-25 | End: 2020-11-16 | Stop reason: SDUPTHER

## 2020-09-25 NOTE — PROGRESS NOTES
Chief Complaint   Patient presents with    New Patient    Establish Cardiologist   [facility] F/u  CONSULT DATE:  06/19/2020    REASON OF CONSULTATION:  Worsening of shortness of breath and  palpitation with history of intermittent chest pain in the last several  weeks. Denied cp since d/c from hospital      Sob on exertion   Ex smoker for few months    EKG done 9-2-20    B/L AKA    Occasional palpitation    Small Pericardial effusion  noted on CT chest not confirmed on echo      Patient Active Problem List   Diagnosis    Status post below knee amputation of right lower extremity ([address]    Gait disturbance    Sebaceous cyst    Uncontrolled type 2 diabetes mellitus with hyperglycemia, with long-term current use of insulin (HCC)    Severe bipolar II disorder, recent episode major depressive, remission (HCC)    Bipolar 1 disorder (HCC)    Leukocytosis    Lactic acidosis    Hyponatremia    Normocytic anemia    Obesity (BMI 30-39. 9)    History of noncompliance with medical treatment    Essential hypertension    Tobacco dependence    Gastroesophageal reflux disease without esophagitis    History of marijuana use    Physical debility    Anemia    Electrolyte imbalance    Type 2 diabetes mellitus with hyperglycemia, with long-term current use of insulin (HCC)    Weakness    Infection of above knee amputation stump of right leg (HCC)    Above knee amputation of right lower extremity (HCC)    Sepsis (HCC)    Vitamin D deficiency    COPD exacerbation (HCC)    Influenza B    Depression, recurrent (HCC)    Major depressive disorder, recurrent (HCC)    GI bleed    Elevated lipase    Other psychoactive substance abuse, uncomplicated (HCC)    Calculus of gallbladder without cholecystitis without obstruction    Right upper quadrant abdominal pain    Pedal edema    MURALI (obstructive sleep apnea)    Shortness of breath    Hypothyroid    Anxiety and depression    Dyspnea    Sinus tachycardia    Metabolic acidosis    Traumatic open wound of left lower leg with delayed healing    Edema of left lower extremity    SOB (shortness of breath) on exertion    Intermittent palpitations    History of chest pain       Past Surgical History:   Procedure Laterality Date    ABSCESS DRAINAGE Right     foot    AMPUTATION ABOVE KNEE Right 4/18/2019    RIGHT ABOVE KNEE AMPUTATION performed by Jayna Ball MD at [address] [facility], LAPAROSCOPIC N/A 4/1/2020    ROBOTIC CHOLECYSTECTOMY performed by Damaris Fleischer, MD at [address] [facility] N/A 7/20/2020    CYSTOSCOPY performed by Irlanda Carrillo MD at [address] [facility] 8/21/2020    CYSTOSCOPY, UROLIFT performed by Irlanda Carrillo MD at [facility], DIAGNOSTIC      FOOT DEBRIDEMENT Right 07/01/2016    I & D    INCISION AND DRAINAGE Right 2/18/2019    I&D RIGHT STUMP performed by Jayna Ball MD at [place] [id number] Right 07/20/2016    LEG AMPUTATION BELOW KNEE Right 4/4/2019    I&D AND REVISION OF AMPUTATION RIGHT LEG performed by Jayna Ball MD at [place] [address] Right 1/14/15    sole of foot I&D    NC DRAIN INFECT SHOULDER BURSA Left 8/18/2017    LEFT SHOULDER INCISION AND DRAINAGE performed by Jayna Ball MD at [address] OFFICE/OUTPT [address] [facility] Right 9/20/2018    EXCISIONAL DEBRIDEMENT RIGHT BKA STUMP performed by Mandy Sharp MD at [address] Right 1/16/15    2nd toe with wound vac applied    UPPER GASTROINTESTINAL ENDOSCOPY N/A 3/3/2020    EGD DILATION SAVORY performed by Fauzia Padgett MD at [address] [facility]  ? when       Allergies   Allergen Reactions    Pcn [Penicillins] Shortness Of Breath, Nausea And Vomiting and Other (See Comments)     Does not remember reactions. Has TOLERATED amoxicillin and several different cephalosporins.      Actos [Pioglitazone] Swelling    Clindamycin/Lincomycin Itching    Vancomycin Hives      Rash, with erythematous plaques to abdomen, shoulders, and proximal upper extremities with pruritis, persisted with 2nd dose administered slower.           Family History   Problem Relation Age of Onset   [address] Diabetes Mother     Other Mother         pneumonia, H1N1    Depression Mother     Early Death Mother     High Blood Pressure Mother     High Cholesterol Mother     Vision Loss Maternal Grandmother     Arthritis Maternal Grandfather     Heart Disease Maternal Grandfather         Social History     Socioeconomic History    Marital status:      Spouse name: Not on file    Number of children: 2    Years of education: 15    Highest education level: Not on file   Occupational History    Not on file   Social Needs    Financial resource strain: Not on file    Food insecurity     Worry: Not on file     Inability: Not on file   [employer] needs     Medical: Not on file     Non-medical: Not on file   Tobacco Use    Smoking status: Current Some Day Smoker     Packs/day: 1.00     Years: 10.00     Pack years: 10.00     Types: Cigars     Start date: 1985     Last attempt to quit: 2000     Years since quittin.0    Smokeless tobacco: Never Used   Substance and Sexual Activity    Alcohol use: Not Currently     Alcohol/week: 0.0 standard drinks    Drug use: No     Comment: 30 YEARS AGO    Sexual activity: Yes     Partners: Female   Lifestyle    Physical activity     Days per week: Not on file     Minutes per session: Not on file    Stress: Not on file   Relationships    Social connections     Talks on phone: Not on file     Gets together: Not on file     Attends Mandaen service: Not on file     Active member of club or organization: Not on file     Attends meetings of clubs or organizations: Not on file     Relationship status: Not on file    Intimate partner violence     Fear of current or ex partner: Not on file Emotionally abused: Not on file     Physically abused: Not on file     Forced sexual activity: Not on file   Other Topics Concern    Not on file   Social History Narrative    Not on file       Current Outpatient Medications   Medication Sig Dispense Refill    metoprolol tartrate (LOPRESSOR) 50 MG tablet Take 1 tablet by mouth 2 times daily 60 tablet 3    Insulin Glargine, 2 Unit Dial, 300 UNIT/ML SOPN Inject 86 Units into the skin 2 times daily 5 pen 5    insulin lispro, 1 Unit Dial, (HUMALOG KWIKPEN) 100 UNIT/ML SOPN Inject 30 Units into the skin 3 times daily (before meals) 5 pen 5    blood glucose monitor kit and supplies Accu Chek Sheila meter. Use to test blood sugars DX:E11.65 1 kit 0    blood glucose monitor strips Accucheck Sheila Test Strips Test blood sugars 4 times daily DX:E11.65 400 strip 5    Lancets MISC Use to test blood sugars 4 times daily DX:E11.65 400 each 5    sertraline (ZOLOFT) 50 MG tablet Take 1 tablet by mouth daily 30 tablet 5    gabapentin (NEURONTIN) 300 MG capsule Take 1 capsule by mouth 3 times daily for 60 days.  90 capsule 1    pantoprazole (PROTONIX) 20 MG tablet Take 1 tablet by mouth every morning (before breakfast) 30 tablet 3    ondansetron (ZOFRAN ODT) 4 MG disintegrating tablet Take 1 tablet by mouth every 8 hours as needed for Nausea or Vomiting 20 tablet 0    oxybutynin (DITROPAN) 5 MG tablet Take 1 tablet by mouth 3 times daily 20 tablet 0    tamsulosin (FLOMAX) 0.4 MG capsule Take 1 capsule by mouth daily Take one capsule daily to facilitate passage of ureteral stone 30 capsule 6    levothyroxine (SYNTHROID) 50 MCG tablet Take 1 tablet by mouth daily 30 tablet 5    lisinopril (PRINIVIL;ZESTRIL) 5 MG tablet Take 1 tablet by mouth daily 30 tablet 0    AMINO ACIDS COMPLEX PO Take 1 each by mouth daily Daily AA drink      Insulin Syringe-Needle U-100 29G X 1/2\" 0.3 ML MISC 1 each by Does not apply route 4 times daily (after meals and at bedtime) 200 each 0 No current facility-administered medications for this visit. Review of Systems -     General ROS: negative  Psychological ROS: negative  Hematological and Lymphatic ROS: No history of blood clots or bleeding disorder. Respiratory ROS: no cough,  or wheezing, the rest see HPI  Cardiovascular ROS: See HPI  Gastrointestinal ROS: negative  Genito-Urinary ROS: no dysuria, trouble voiding, or hematuria  Musculoskeletal ROS: negative  Neurological ROS: no TIA or stroke symptoms  Dermatological ROS: negative      Blood pressure (!) 150/85, pulse 105, height 5' 6\" (1.676 m). Physical Examination:    General appearance - alert, well appearing, and in no distress  HEENT- Pink conjunctiva  , Non-icteri sclera,PERRLA  Mental status - alert, oriented to person, place, and time  Neck - supple, no significant adenopathy, no JVD, or carotid bruits  Chest - clear to auscultation, no wheezes, rales or rhonchi, symmetric air entry  Heart - normal rate, regular rhythm, normal S1, S2, no murmurs, rubs, clicks or gallops  Abdomen - soft, nontender, nondistended, no masses or organomegaly  ISMAEL- no CVA or flank tenderness, no suprapubic tenderness  Neurological - alert, oriented, normal speech, no focal findings or movement disorder noted  Musculoskeletal/limbs - no joint tenderness, deformity or swelling   - peripheral pulses normal, no pedal edema, no clubbing or cyanosis  Skin - normal coloration and turgor, no rashes, no suspicious skin lesions noted  Psych- appropriate mood and affect    Lab  No results for input(s): CKTOTAL, CKMB, CKMBINDEX, TROPONINI in the last 72 hours.   CBC:   Lab Results   Component Value Date    WBC 10.5 09/10/2020    RBC 5.09 09/10/2020    HGB 16.0 09/10/2020    HCT 46.2 09/10/2020    MCV 90.8 09/10/2020    MCH 31.4 09/10/2020    MCHC 34.6 09/10/2020    RDW 14.8 03/17/2020     09/10/2020    MPV 9.8 09/10/2020     BMP:    Lab Results   Component Value Date     09/10/2020    K 4.6 09/10/2020    CL 96 09/10/2020    CO2 25 09/10/2020    BUN 9 09/10/2020    LABALBU 4.3 09/10/2020    CREATININE 0.7 09/10/2020    CALCIUM 9.8 09/10/2020    GFRAA >60 08/24/2016    LABGLOM >90 09/10/2020    GLUCOSE 385 09/10/2020    GLUCOSE 318 03/17/2020     Hepatic Function Panel:    Lab Results   Component Value Date    ALKPHOS 79 09/10/2020    ALT 30 09/10/2020    AST 32 09/10/2020    PROT 8.5 09/10/2020    BILITOT 0.4 09/10/2020    BILIDIR <0.2 08/22/2020    IBILI 0.1 03/02/2015    LABALBU 4.3 09/10/2020     Magnesium:    Lab Results   Component Value Date    MG 2.1 06/20/2020     Warfarin PT/INR:  No components found for: PTPATWAR, PTINRWAR  HgBA1c:    Lab Results   Component Value Date    LABA1C 10.9 07/06/2020     FLP:    Lab Results   Component Value Date    TRIG 328 10/10/2018    HDL 30 10/10/2018    LDLCALC 65 10/10/2018     TSH:    Lab Results   Component Value Date    TSH 2.130 06/18/2020           1. There is a small pericardial effusion. 2. There is suboptimal opacification of the pulmonary arterial system. However, there are no large pulmonary emboli noted in the main pulmonary outflow tract or right or left main pulmonary arteries.                        This report has been created using voice recognition software. It may contain minor errors which are inherent in voice recognition technology.        Final report electronically signed by Dr Janel Geronimo on 9/3/2020 12:07 AM       Conclusions      Summary   Lexiscan EKG stress test is not suggestive for ischemia. The nuclear images is not suggestive for myocardial ischemia. Signatures      ----------------------------------------------------------------   Electronically signed by Eda Huang MD (Interpreting   Cardiologist) on 06/19/2020 at 18:21   ----------------------------------------------------------------         Conclusions      Summary   Normal left ventricle size and systolic function.  Ejection fraction was   estimated at 60 %. There were no regional left ventricular wall motion   abnormalities and wall thickness was within normal limits. Doppler parameters were consistent with abnormal left ventricular   relaxation (grade 1 diastolic dysfunction). The pericardium was normal in appearance with no evidence of a pericardial   effusion. Signature    ----------------------------------------------------------------   Electronically signed by Tasneem Pepe MD (Interpreting   physician) on 09/15/2020 at 05:55 PM      Assessment    Small Pericardial effusion  noted on CT chest not confirmed on echo     Diagnosis Orders   1. SOB (shortness of breath) on exertion  Cardiac event monitor   2. Intermittent palpitations  Cardiac event monitor   3. Essential hypertension  Cardiac event monitor   4. History of chest pain  Cardiac event monitor   5. Sinus tachycardia           Recent admission DX    ASSESSMENT:  1. Shortness of breath, basically unexplained the course of which is  not very clear, so probably the patient may benefit from pulmonary  function study on that line and once cardiac cause of short of breath  has been ruled out. He has history of smoking, and he has truncal  obesity. 2.  Hypertension. 3.  Diabetes with complication, neuropathy and diabetic foot abscess,  above-the-knee amputation on the right, wheelchair-bound. 4.  Hypothyroidism. 5.  Anxiety, depression, bipolar disorder. 6.  Above-the-knee amputation on the right related to diabetes. 7.  Wheelchair-bound. 8.  Palpitation, intermittent in the last several months. 9.  History of intermittent chest pain once in a while. Plan     meds and labs reviewdd    Patient Seen, Chart, Consults notes, Labs, Radiology studies reviewed.       Sinus tachy  TSH okay    Small Pericardial effusion  noted on CT chest 06/2020 not confirmed on echo  09/2020 and 05/2020  Hx of URTI in June 2020    Palpitation  Could not complete zio patch was not able to be attached to his

## 2020-10-02 ENCOUNTER — HOSPITAL ENCOUNTER (OUTPATIENT)
Age: 50
Discharge: HOME | End: 2020-10-02
Payer: MEDICARE

## 2020-10-02 ENCOUNTER — HOSPITAL ENCOUNTER (OUTPATIENT)
Dept: GENERAL RADIOLOGY | Age: 50
Discharge: HOME | End: 2020-10-02
Payer: MEDICARE

## 2020-10-02 ENCOUNTER — HOSPITAL ENCOUNTER (OUTPATIENT)
Dept: WOUND CARE | Age: 50
Discharge: HOME | End: 2020-10-02
Payer: MEDICARE

## 2020-10-02 VITALS
HEART RATE: 103 BPM | DIASTOLIC BLOOD PRESSURE: 76 MMHG | TEMPERATURE: 97.1 F | SYSTOLIC BLOOD PRESSURE: 147 MMHG | OXYGEN SATURATION: 99 % | RESPIRATION RATE: 16 BRPM

## 2020-10-02 PROBLEM — M79.605 ACUTE LEG PAIN, LEFT: Status: ACTIVE | Noted: 2020-10-02

## 2020-10-02 PROCEDURE — 97597 DBRDMT OPN WND 1ST 20 CM/<: CPT | Performed by: NURSE PRACTITIONER

## 2020-10-02 PROCEDURE — 97597 DBRDMT OPN WND 1ST 20 CM/<: CPT

## 2020-10-02 PROCEDURE — 17250 CHEM CAUT OF GRANLTJ TISSUE: CPT

## 2020-10-02 PROCEDURE — 6370000000 HC RX 637 (ALT 250 FOR IP): Performed by: NURSE PRACTITIONER

## 2020-10-02 PROCEDURE — 73590 X-RAY EXAM OF LOWER LEG: CPT

## 2020-10-02 RX ORDER — LIDOCAINE HYDROCHLORIDE 20 MG/ML
JELLY TOPICAL ONCE
Status: COMPLETED | OUTPATIENT
Start: 2020-10-02 | End: 2020-10-02

## 2020-10-02 RX ADMIN — SILVER NITRATE APPLICATORS 1 EACH: 25; 75 STICK TOPICAL at 10:24

## 2020-10-02 RX ADMIN — LIDOCAINE HYDROCHLORIDE: 20 JELLY TOPICAL at 10:20

## 2020-10-02 ASSESSMENT — ENCOUNTER SYMPTOMS
TROUBLE SWALLOWING: 0
SORE THROAT: 0
CONSTIPATION: 0
SHORTNESS OF BREATH: 0
VOMITING: 0
NAUSEA: 0
DIARRHEA: 0
COUGH: 0
ABDOMINAL PAIN: 0

## 2020-10-02 ASSESSMENT — PAIN SCALES - GENERAL: PAINLEVEL_OUTOF10: 10

## 2020-10-02 NOTE — PLAN OF CARE
Problem: Wound:  Goal: Will show signs of wound healing; wound closure and no evidence of infection  Description: Will show signs of wound healing; wound closure and no evidence of infection  Outcome: Ongoing   Pt. Seen today for a left leg wound see AVS for new orders. Follow up in 2 weeks. Care plan reviewed with patient. Patient verbalize understanding of the plan of care and contribute to goal setting.

## 2020-10-02 NOTE — PROGRESS NOTES
[address] [place] [address] and Procedure Note      [facility] RECORD NUMBER:  [id number]  AGE: 44 y.o. GENDER: male  : 1970  EPISODE DATE:  10/2/2020    SUBJECTIVE:     Chief Complaint   Patient presents with    Wound Check     left leg         HISTORY OF PRESENT ILLNESS      Keely Mcclelland is a 44 y.o. male who presents today for re-evaluation of wound to left leg. James Bhatia was last seen at clinic for similar wounds on 2020. He was unable to make his follow up appointment. Clinic was unable to make contact with James Bhatia on several attempts. James Bhatia reports that current wound to left shin began as a tear on 2020. Had been applying betadine and nonadherent gauze. Current wound care includes silver alginate, covered with ABD, Kerlix and coban. James Bhatia reports that he has been taking leaving dressings off at night. States that he now has pain to wound that he did not have at last visit. States that his blood glucose levels remain greater than 400. Has been working with his PCP on this. Has chronic fever, chills, night-sweats, diarrhea-currently followed by PCP and GI specialist.     PAST MEDICAL HISTORY             Diagnosis Date    Bipolar 2 disorder [facility])     previously followed with Dr. Louis Carey and Isha Case in [facility] BPH (benign prostatic hyperplasia)     Diabetes mellitus type 2, uncontrolled ([address]     HgbA1c on 2019 was 9.1.     Diabetic peripheral neuropathy ([address]     Diabetic polyneuropathy ([address]     Diabetic ulcer of right foot associated with type 2 diabetes mellitus ([address] 12/10/2015    Essential hypertension     \"never been on b/p medication that I know of\"    GERD (gastroesophageal reflux disease)     Hammer toe of left foot     Heart murmur     denies any chest pain or palpitations    History of tobacco abuse     Hx of AKA (above knee amputation), right ([address] 2019    Dr. Keira Dunaway Macular edema, diabetic, bilateral ([facility] 05/04/2018    Dr. Sharee Ledezma referred to retina specialist for 2nd opinion    Marijuana abuse in remission     Melena     MRSA (methicillin resistant staph aureus) culture positive     Onychomycosis     Other disorders of kidney and ureter in diseases classified elsewhere     Sleep apnea     no cpap    Thyroid disease     WD-Skin ulcer of fourth toe of right foot with necrosis of bone ([facility] 6/29/2016       PAST SURGICAL HISTORY     Past Surgical History:   Procedure Laterality Date    ABSCESS DRAINAGE Right     foot    AMPUTATION ABOVE KNEE Right 4/18/2019    RIGHT ABOVE KNEE AMPUTATION performed by Dustin Esqueda MD at [address] [facility], LAPAROSCOPIC N/A 4/1/2020    ROBOTIC CHOLECYSTECTOMY performed by Dylan Andre MD at [place] [address] N/A 7/20/2020    CYSTOSCOPY performed by Rohit Pike MD at [address] [place] [address] 8/21/2020    Charlett Camarillo performed by Rohit Pike MD at [address] [facility], DIAGNOSTIC      FOOT DEBRIDEMENT Right 07/01/2016    I & D    INCISION AND DRAINAGE Right 2/18/2019    I&D RIGHT STUMP performed by Dustin Esqueda MD at [address] [facility] [facility] Right 07/20/2016    LEG AMPUTATION BELOW KNEE Right 4/4/2019    I&D AND REVISION OF AMPUTATION RIGHT LEG performed by Dustin Esqueda MD at [address] [facility] Right 1/14/15    sole of foot I&D    FL DRAIN INFECT SHOULDER BURSA Left 8/18/2017    LEFT SHOULDER INCISION AND DRAINAGE performed by Dustin Esqueda MD at [facility] OFFICE/OUTPT [facility] Right 9/20/2018    EXCISIONAL DEBRIDEMENT RIGHT BKA STUMP performed by Haley Estrada MD at [address] [facility] [address] Right 1/16/15    2nd toe with wound vac applied    UPPER GASTROINTESTINAL ENDOSCOPY N/A 3/3/2020    EGD DILATION SAVORY performed by Steve Camargo MD at [address] [facility]  ? when       FAMILY HISTORY     Family History   Problem Relation Age of Onset    Diabetes Mother     Other Mother         pneumonia, H1N1    Depression Mother     Early Death Mother     High Blood Pressure Mother     High Cholesterol Mother     Vision Loss Maternal Grandmother     Arthritis Maternal Grandfather     Heart Disease Maternal Grandfather        SOCIAL HISTORY     Social History     Tobacco Use    Smoking status: Former Smoker     Packs/day: 1.00     Years: 10.00     Pack years: 10.00     Types: Cigars     Start date: 1985     Last attempt to quit: 2000     Years since quittin.1    Smokeless tobacco: Never Used   Substance Use Topics    Alcohol use: Not Currently     Alcohol/week: 0.0 standard drinks    Drug use: No     Comment: 30 YEARS AGO       ALLERGIES     Allergies   Allergen Reactions    Pcn [Penicillins] Shortness Of Breath, Nausea And Vomiting and Other (See Comments)     Does not remember reactions. Has TOLERATED amoxicillin and several different cephalosporins.  Actos [Pioglitazone] Swelling    Clindamycin/Lincomycin Itching    Vancomycin Hives      Rash, with erythematous plaques to abdomen, shoulders, and proximal upper extremities with pruritis, persisted with 2nd dose administered slower. MEDICATIONS     Current Outpatient Medications on File Prior to Encounter   Medication Sig Dispense Refill    metoprolol tartrate (LOPRESSOR) 50 MG tablet Take 1 tablet by mouth 2 times daily 60 tablet 3    Insulin Glargine, 2 Unit Dial, 300 UNIT/ML SOPN Inject 86 Units into the skin 2 times daily 5 pen 5    insulin lispro, 1 Unit Dial, (HUMALOG KWIKPEN) 100 UNIT/ML SOPN Inject 30 Units into the skin 3 times daily (before meals) 5 pen 5    blood glucose monitor kit and supplies Accu Chek Sheila meter.  Use to test blood sugars DX:E11.65 1 kit 0    blood glucose monitor strips Accucheck Sheila Test Strips Test blood sugars 4 times daily DX:E11.65 400 strip 5    Lancets MISC Use to test blood sugars 4 times daily DX:E11.65 400 each 5    sertraline (ZOLOFT) 50 MG tablet Take 1 tablet by mouth daily 30 tablet 5    gabapentin (NEURONTIN) 300 MG capsule Take 1 capsule by mouth 3 times daily for 60 days. 90 capsule 1    pantoprazole (PROTONIX) 20 MG tablet Take 1 tablet by mouth every morning (before breakfast) 30 tablet 3    ondansetron (ZOFRAN ODT) 4 MG disintegrating tablet Take 1 tablet by mouth every 8 hours as needed for Nausea or Vomiting 20 tablet 0    oxybutynin (DITROPAN) 5 MG tablet Take 1 tablet by mouth 3 times daily 20 tablet 0    tamsulosin (FLOMAX) 0.4 MG capsule Take 1 capsule by mouth daily Take one capsule daily to facilitate passage of ureteral stone 30 capsule 6    levothyroxine (SYNTHROID) 50 MCG tablet Take 1 tablet by mouth daily 30 tablet 5    lisinopril (PRINIVIL;ZESTRIL) 5 MG tablet Take 1 tablet by mouth daily 30 tablet 0    AMINO ACIDS COMPLEX PO Take 1 each by mouth daily Daily AA drink      Insulin Syringe-Needle U-100 29G X 1/2\" 0.3 ML MISC 1 each by Does not apply route 4 times daily (after meals and at bedtime) 200 each 0     No current facility-administered medications on file prior to encounter. REVIEW OF SYSTEMS:     Constitutional: +fever, chills, night sweats  Head: Denies headache,  dizziness, loss of consciousness  Respiratory: Denies shortness of breath, cough, wheezing, dyspnea with exertion  Cardiovascular:Denies chest pain, palpitations  Gastrointestinal: +diarrhea, GI upset   Musculoskeletal: +Right AKA, +LLE edema Denies joint pain, stiffness  Hematology: Denies easy brusing or bleeding, hx of clotting disorder  Dermatology: +Left shin wound Denies skin rash, eczema, pruritis    PHYSICAL EXAM:     BP (!) 147/76   Pulse 103   Temp 97.1 °F (36.2 °C) (Tympanic)   Resp 16   SpO2 99%   Wt Readings from Last 3 Encounters:   09/17/20 212 lb (96.2 kg)   09/14/20 212 lb (96.2 kg)   09/10/20 200 lb (90.7 kg)     General:  Awake, alert, no apparent distress.   Appears stated age  [de-identified]: 10/02/20 1004   Black%Wound Bed 5 09/18/20 1044   Other%Wound Bed 100 brown 10/02/20 1006   Number of days: 14         LABS     Micro:   Lab Results   Component Value Date    BC No growth-preliminary No growth  08/22/2020       ASSESSMENT     -Traumatic open wound of left lower leg with delayed healing  -edema of left lower extremity  -acute left leg pain     Patient Active Problem List   Diagnosis Code    Status post below knee amputation of right lower extremity ([facility] Z89.511    Gait disturbance R26.9    Sebaceous cyst L72.3    Uncontrolled type 2 diabetes mellitus with hyperglycemia, with long-term current use of insulin ([facility]) E11.65, Z79.4    Severe bipolar II disorder, recent episode major depressive, remission ([facility]) F31.81    Bipolar 1 disorder ([facility]) F31.9    Leukocytosis D72.829    Lactic acidosis E87.2    Hyponatremia E87.1    Normocytic anemia D64.9    Obesity (BMI 30-39. 9) E66.9    History of noncompliance with medical treatment Z91.19    Essential hypertension I10    Tobacco dependence F17.200    Gastroesophageal reflux disease without esophagitis K21.9    History of marijuana use Z87.898    Physical debility R53.81    Anemia D64.9    Electrolyte imbalance E87.8    Type 2 diabetes mellitus with hyperglycemia, with long-term current use of insulin ([facility]) E11.65, Z79.4    Weakness R53.1    Infection of above knee amputation stump of right leg ([facility]) T87.43    Above knee amputation of right lower extremity ([facility]) Q18.888S    Sepsis ([facility]) A41.9    Vitamin D deficiency E55.9    COPD exacerbation ([facility]) J44.1    Influenza B J10.1    Depression, recurrent ([facility]) F33.9    Major depressive disorder, recurrent ([facility]) F33.9    GI bleed K92.2    Elevated lipase R74.8    Other psychoactive substance abuse, uncomplicated ([facility]) E08.20    Calculus of gallbladder without cholecystitis without obstruction K80.20    Right upper quadrant abdominal pain R10.11    Pedal edema R60.0    MURALI (obstructive sleep apnea) G47.33    Shortness of breath R06.02    Hypothyroid E03.9    Anxiety and depression F41.9, F32.9    Dyspnea R06.00    Sinus tachycardia P75.2    Metabolic acidosis C02.4    Traumatic open wound of left lower leg with delayed healing S81.802D    Edema of left lower extremity R60.0    SOB (shortness of breath) on exertion R06.02    Intermittent palpitations R00.2    History of chest pain Z87.898     PROCEDURE NOTE     Indications:  Based on my examination of this patient's wound(s)/ulcer(s) today, debridement is required to promote healing and evaluate the extent healing. Performed by: [address] [place] [address], [facility]    Consent obtained: Yes    Time out taken:  Yes    Pain control: lidocaine 2% gel    Debridement:Non-excisional Debridement    Using curette the wound(s)/ulcer(s) was/were sharply debrided down through and including the removal of devitalized tissue. Devitalized Tissue Debrided:  necrotic/eschar    Pre Debridement Measurements:  Are located in the Wound/Ulcer Documentation Flow Sheet    Wound/Ulcer #: 1    Post Debridement Measurements:    Wound/Ulcer Descriptions are listed under Physical Exam above. Wound/Ulcer Descriptions are Pre Debridement except measurements    Percent of Wound/Ulcer Debrided: 100%    Total Surface Area Debrided:  2.25 sq cm     Bleeding:  Minimal    Hemostasis Achieved:  by silver nitrate stick    Procedural Pain:  0  / 10     Post Procedural Pain:  0 / 10     Response to treatment:  Well tolerated by patient. PLAN     Patient examined and evaluated  -Left shin traumatic wound with delayed healing- Wound measuring smaller at this visit. No drainage observed pre and post debridement. Wound debrided today, post debridement wound bed is red, free of any biofilm or debris.   Louise-wound appears to be macerated- reinforced importance of dressing changes as ordered, leaving open to air most likely is what led to re-occurrence of scab/necrotic tissue to wound bed. Presented to visit today with wound open to air.     -Acute left leg pain- will obtain xray of left lower extremity to rule out bone involvement due to development of pain since last visit.       -Wound care ordered as follows:  left leg- Apply silver alginate to wound bed. Cover with ABD, kerlix and then coban. Wrap to start at base of toes up to 1-2 inches below the bend of the knee. Change three times weekly.     -Reinforced importance of good control of DM in wound healing. Continue management as per PCP. -Dependent rubor-Similar as compared to last visit. Reinforced importance of elevation and keeping leg covered. SHADY's were last completed in 11/2019-showed no abnormalities. Continue to monitor. Follow up with PCP/seek emergency care for any ongoing concerns. Eugene's signs negative at today's visit.      -Elevate leg while at rest to help decrease edema. Do not leave in dependent position.      Antibiotics: No, no indications of acute infectious process at this time. Discussed signs and symptoms of infection with Sarita Eugene. Call clinic or seek emergency care if these should occur.       -Follow up 2 weeks for re-evaluation. Call clinic with any needs or concerns prior to scheduled visit. All questions and concerns addressed prior to discharge from today's visit. Please see attached Discharge Instructions    Written patient dismissal instructions given to patient and signed by patient or POA.          Discharge Instructions         Discharge Instructions       Visit Discharge/Physician Orders:  - Debridement to left leg.  - x-ray ordered today have done before next appointment  - elevate legs through out the day to reduce swelling  - continue to work on blood sugars     Wound Location: left leg     Dressing orders:      1) Gather wound care supplies and arrange on clean table.      2) Wash your hands with soap and water or use alcohol based hand  for 20 seconds (sing \"Happy Birthday\" twice).    3) Cleanse wounds with normal saline or wound cleanser and gauze. Pat dry with clean gauze.    4) left leg- Apply silver alginate to wound bed. Cover with ABD, kerlix and then coban. Wrap to start at base of toes up to 1-2 inches below the bend of the knee. Change three times weekly.      Keep all dressings clean & dry.     Do not shower, take baths or get wound wet, unless otherwise instructed by your Wound Care doctor.      Follow up visit:   2 Weeks Friday October 16th at 09:00 am      Keep next scheduled appointment. Please give 24 hour notice if unable to keep appointment. [phone]     If you experience any of the following, please call the Wound Care Service during business hours: Monday through Friday 8:00 am - 4:30 pm  ([phone]). *Increase in pain              *Temperature over 101              *Increase in drainage from your wound or a foul odor              *Uncontrolled swelling              *Need for compression bandage changes due to slippage, breakthrough drainage     If you need medical attention outside of business hours, please contact your Primary Care Doctor or go to the nearest emergency room.          Electronically signed by MARILYN Alston CNP on 10/2/2020 at 10:49 AM

## 2020-10-06 ENCOUNTER — HOSPITAL ENCOUNTER (OUTPATIENT)
Dept: ULTRASOUND IMAGING | Age: 50
Discharge: HOME | End: 2020-10-06
Payer: MEDICARE

## 2020-10-06 PROCEDURE — 76705 ECHO EXAM OF ABDOMEN: CPT

## 2020-10-07 ENCOUNTER — OFFICE VISIT (OUTPATIENT)
Dept: INTERNAL MEDICINE CLINIC | Age: 50
End: 2020-10-07
Payer: MEDICARE

## 2020-10-07 VITALS
SYSTOLIC BLOOD PRESSURE: 176 MMHG | BODY MASS INDEX: 34.68 KG/M2 | DIASTOLIC BLOOD PRESSURE: 76 MMHG | HEIGHT: 66 IN | TEMPERATURE: 98.1 F | WEIGHT: 215.8 LBS | HEART RATE: 119 BPM

## 2020-10-07 PROCEDURE — G0108 DIAB MANAGE TRN  PER INDIV: HCPCS | Performed by: INTERNAL MEDICINE

## 2020-10-07 RX ORDER — PANTOPRAZOLE SODIUM 40 MG/1
40 TABLET, DELAYED RELEASE ORAL DAILY
Status: ON HOLD | COMMUNITY
Start: 2020-10-02 | End: 2021-07-27

## 2020-10-07 RX ORDER — EMPAGLIFLOZIN 10 MG/1
1 TABLET, FILM COATED ORAL DAILY
Qty: 30 TABLET | Refills: 5 | Status: SHIPPED | OUTPATIENT
Start: 2020-10-07 | End: 2020-11-16 | Stop reason: SDUPTHER

## 2020-10-07 NOTE — PATIENT INSTRUCTIONS
Get your lab work completed today  Get chest xray done today  New Medication Abigail Lau has been ordered--you can start taking the Jardiance once            Daily  Stick with your meal plan and keep carbs around 30-45 grams at your meals  Drink plenty of water --great job!

## 2020-10-07 NOTE — PROGRESS NOTES
[facility]  [address] [facility] [place], [address]  [phone] (phone)  [phone] (fax)    Patient ID: Rogelio Hager 1970  Referring Provider: Lainey Sena CNP     Patient's name and  were verified. Subjective:    He presents for His follow-up diabetic visit. He has type 2 diabetes mellitus. Home regimen includes: insulin He is noncompliant some of the time. Assessment:     Lab Results   Component Value Date    LABA1C 10.9 2020    BUN 9 09/10/2020    CREATININE 0.7 09/10/2020     There were no vitals filed for this visit. Wt Readings from Last 3 Encounters:   20 212 lb (96.2 kg)   20 212 lb (96.2 kg)   09/10/20 200 lb (90.7 kg)     Ht Readings from Last 3 Encounters:   20 5' 6\" (1.676 m)   20 5' 6\" (1.676 m)   20 5' 6\" (1.676 m)       Current monitoring regimen: home blood tests - 2-3 times daily. Patient did not bring meter  Home blood sugar trends: all high-over 200  Any episodes of hypoglycemia? No lowest reading 119  Previous visit with dietician: yes  Current diet: B egg white and RAMO oatmeal 1 cup                  L tuna salad/ 1 bread                  D BBQ chicekn/ salad                  Rare snacks --chew SF gum, 1 piece SF hard tack per day  Current exercise: ADL's -works night; limited activity  Eye exam current (within one year): no  2018 Dr. Ana Sagluero  Any history of foot problems? yes NayanKA  Last foot exam: 1 week ago with wound clinic  Immunizations up to date: yes - 2019  Taking ASA:  No - If not why? Not ordered  Appropriate for use of MyChart Glucose Grid:  No    Focus:     Diabetes education. Recent A1C 10.9% He did not bring his meter today/ last meter readings seen in the office 20. He is very agitated today stating he doesn't feel good. He is mad that he is eating limited carbs and taking his insulin shots and glucose levels remain above 200-300. He denies missing any shots and dietary recall shows limited carb intake.  He continues with sweats of the time. He reports being SOB with a cough for 1 week and he reports numbness in his whole left arm 2 nights ago. Reviewed his clinic picture/ complaints with Neil Mcburney CNP. RN cannot verify insulin dosing, dietary intake or glucose monitoring. Orders received for lab work, CXR and Jardiance. Ben Villanueva instructed on Jardiance use, including hydration. He states he will do now and get blood work completed. Encouragement given. Ben Villanueva could benefit from profession CGM to better evaluation glucose control. Follow up 3 months. DSME PLAN:   Discussed general issues about diabetes pathophysiology and management. Counseling at today's visit: BG goals; carbs; Jardiance; exercise. Get your lab work completed today  Get chest xray done today  New Medication Pbe Holding has been ordered--you can start taking the Jardiance once            Daily  Stick with your meal plan and keep carbs around 30-45 grams at your meals  Drink plenty of water --great job! Meter download, medications, PMH and nursing assessment reviewed with RON Anguiano CNP. Henry Messina states He is willing to participate in this plan of care and verbalized understanding of all instructions provided. Teach back used to verify comprehension. Total time involved in direct patient education: 60 minutes.

## 2020-10-07 NOTE — PROGRESS NOTES
Pt in to see Wang Choe, concerns of cough, non productive, SOB. Get chest xray, and blood work and 24 hour urine labs already ordered in system.

## 2020-10-07 NOTE — PROGRESS NOTES
Pt with concern of sweating during the daytime without activity. He also has had concerns of diarrhea. Blood cultures negative x2 8/22/2020  CRP 0.85 9/2/2020  HIV negative 10/10/18. Abdomen and chest CT without tumors or masses noted. Echo negative for endocarditis. Get 24 hour urine for 5-HIAA, metanephrines and catecholamines, TSH with reflex to positively r/o pheo/carcinoid and hyperthyroidism, although pt is on Synthroid. Will also check BMP, mag, CBC    Advised pt to check temp twice daily and anytime he is sweating in addition to glucose, BP and report these at next office visit or sooner if abnormal.     Etiologies also include: Insulin, beta blocker, SSRI, hyper/hypoglycemia, neuropathy, effects of diabetes otherwise.

## 2020-10-08 ENCOUNTER — NURSE ONLY (OUTPATIENT)
Dept: LAB | Age: 50
End: 2020-10-08

## 2020-10-08 LAB
ALBUMIN SERPL-MCNC: 3.8 G/DL (ref 3.5–5.1)
ALP BLD-CCNC: 70 U/L (ref 38–126)
ALT SERPL-CCNC: 36 U/L (ref 11–66)
ANION GAP SERPL CALCULATED.3IONS-SCNC: 13 MEQ/L (ref 8–16)
AST SERPL-CCNC: 48 U/L (ref 5–40)
AVERAGE GLUCOSE: 231 MG/DL (ref 70–126)
BASOPHILS # BLD: 0.9 %
BASOPHILS ABSOLUTE: 0.1 THOU/MM3 (ref 0–0.1)
BILIRUB SERPL-MCNC: 0.4 MG/DL (ref 0.3–1.2)
BUN BLDV-MCNC: 16 MG/DL (ref 7–22)
CALCIUM SERPL-MCNC: 9.4 MG/DL (ref 8.5–10.5)
CHLORIDE BLD-SCNC: 101 MEQ/L (ref 98–111)
CHOLESTEROL, FASTING: 149 MG/DL (ref 100–199)
CO2: 20 MEQ/L (ref 23–33)
CREAT SERPL-MCNC: 0.6 MG/DL (ref 0.4–1.2)
EOSINOPHIL # BLD: 5.1 %
EOSINOPHILS ABSOLUTE: 0.5 THOU/MM3 (ref 0–0.4)
ERYTHROCYTE [DISTWIDTH] IN BLOOD BY AUTOMATED COUNT: 14.4 % (ref 11.5–14.5)
ERYTHROCYTE [DISTWIDTH] IN BLOOD BY AUTOMATED COUNT: 48.5 FL (ref 35–45)
GFR SERPL CREATININE-BSD FRML MDRD: > 90 ML/MIN/1.73M2
GLUCOSE FASTING: 341 MG/DL (ref 70–108)
HBA1C MFR BLD: 9.7 % (ref 4.4–6.4)
HCT VFR BLD CALC: 43.2 % (ref 42–52)
HDLC SERPL-MCNC: 27 MG/DL
HEMOGLOBIN: 14.7 GM/DL (ref 14–18)
IMMATURE GRANS (ABS): 0.14 THOU/MM3 (ref 0–0.07)
IMMATURE GRANULOCYTES: 1.5 %
LDL CHOLESTEROL CALCULATED: 72 MG/DL
LIPASE: 130 U/L (ref 5.6–51.3)
LYMPHOCYTES # BLD: 25.5 %
LYMPHOCYTES ABSOLUTE: 2.4 THOU/MM3 (ref 1–4.8)
MAGNESIUM: 2 MG/DL (ref 1.6–2.4)
MCH RBC QN AUTO: 31.4 PG (ref 26–33)
MCHC RBC AUTO-ENTMCNC: 34 GM/DL (ref 32.2–35.5)
MCV RBC AUTO: 92.3 FL (ref 80–94)
MONOCYTES # BLD: 8.5 %
MONOCYTES ABSOLUTE: 0.8 THOU/MM3 (ref 0.4–1.3)
NUCLEATED RED BLOOD CELLS: 0 /100 WBC
PLATELET # BLD: 204 THOU/MM3 (ref 130–400)
PMV BLD AUTO: 9.6 FL (ref 9.4–12.4)
POTASSIUM SERPL-SCNC: 4.4 MEQ/L (ref 3.5–5.2)
RBC # BLD: 4.68 MILL/MM3 (ref 4.7–6.1)
SEG NEUTROPHILS: 58.5 %
SEGMENTED NEUTROPHILS ABSOLUTE COUNT: 5.5 THOU/MM3 (ref 1.8–7.7)
SODIUM BLD-SCNC: 134 MEQ/L (ref 135–145)
TOTAL PROTEIN: 7.7 G/DL (ref 6.1–8)
TRIGLYCERIDE, FASTING: 252 MG/DL (ref 0–199)
TSH SERPL DL<=0.05 MIU/L-ACNC: 2.69 UIU/ML (ref 0.4–4.2)
VITAMIN D 25-HYDROXY: 14 NG/ML (ref 30–100)
WBC # BLD: 9.4 THOU/MM3 (ref 4.8–10.8)

## 2020-10-12 ENCOUNTER — TELEPHONE (OUTPATIENT)
Dept: INTERNAL MEDICINE CLINIC | Age: 50
End: 2020-10-12

## 2020-10-12 NOTE — TELEPHONE ENCOUNTER
----- Message from Arianne Frausto, MARILYN - CNP sent at 10/9/2020  9:18 AM EDT -----  Kidney function ok, a1c down to 9.7, lipase is back up, have labs forwarded to his GI doc. Ok to start jardiance.  Recheck BMP in 2 weeks

## 2020-10-12 NOTE — TELEPHONE ENCOUNTER
Spoke with pt and he stated he is already started the jardiance and is having swelling in his leg . He also contacted 's office to let them know he I having the diarrhea again. He agreed to start the vitamin   D as well.

## 2020-10-12 NOTE — TELEPHONE ENCOUNTER
----- Message from MARILYN Rosario CNP sent at 10/9/2020  9:19 AM EDT -----  Vitamin D low, start 1000 IU daily and recheck vit d in 3 months

## 2020-10-12 NOTE — TELEPHONE ENCOUNTER
Jasmyn Berrios will not cause swelling, it should cause him to have increased thirst and increased urination at first as he urinates off the excess glucose. Leg swelling may be dependant, or may be related to his chronic leg ulcer, have him contact wound clinic to determine when he can start applying a AUGUSTO hose to this leg. In the meantime elevate, increase water, reduce sodium intake.

## 2020-10-14 ENCOUNTER — TELEPHONE (OUTPATIENT)
Dept: CARDIOLOGY CLINIC | Age: 50
End: 2020-10-14

## 2020-10-14 RX ORDER — GABAPENTIN 300 MG/1
300 CAPSULE ORAL 3 TIMES DAILY
Qty: 90 CAPSULE | Refills: 1 | Status: SHIPPED | OUTPATIENT
Start: 2020-10-14 | End: 2020-10-29 | Stop reason: SDUPTHER

## 2020-10-14 RX ORDER — ONDANSETRON 4 MG/1
4 TABLET, ORALLY DISINTEGRATING ORAL EVERY 8 HOURS PRN
Qty: 20 TABLET | Refills: 0 | Status: SHIPPED | OUTPATIENT
Start: 2020-10-14 | End: 2021-01-22 | Stop reason: ALTCHOICE

## 2020-10-14 RX ORDER — INSULIN LISPRO 100 [IU]/ML
30 INJECTION, SOLUTION INTRAVENOUS; SUBCUTANEOUS
Qty: 5 PEN | Refills: 5 | Status: SHIPPED | OUTPATIENT
Start: 2020-10-14 | End: 2020-11-16 | Stop reason: SDUPTHER

## 2020-10-14 NOTE — TELEPHONE ENCOUNTER
LEft several messages for patient to call us to get his echo scheduled.   He isn't returning my calls

## 2020-10-16 ENCOUNTER — TELEPHONE (OUTPATIENT)
Dept: WOUND CARE | Age: 50
End: 2020-10-16

## 2020-10-21 ENCOUNTER — TELEPHONE (OUTPATIENT)
Dept: CARDIOLOGY CLINIC | Age: 50
End: 2020-10-21

## 2020-10-21 NOTE — TELEPHONE ENCOUNTER
Please see My Chart message:       Khadar Potter  To   [facility] Clinical Support Pool  Sent   10/20/2020  5:19 PM    I'm still having a rapid heart rate and I feel my heart flutter sometimes

## 2020-10-24 NOTE — TELEPHONE ENCOUNTER
Need to have event monitor for me to address the issue  Could not wear Zio patch  In the last visit I advised to get event monitor and then we will act accordingly    Event monitor for 3 weeks    See me with the result in 5 weeks

## 2020-10-29 ENCOUNTER — OFFICE VISIT (OUTPATIENT)
Dept: INTERNAL MEDICINE CLINIC | Age: 50
End: 2020-10-29
Payer: MEDICARE

## 2020-10-29 VITALS
DIASTOLIC BLOOD PRESSURE: 55 MMHG | HEIGHT: 66 IN | TEMPERATURE: 98.6 F | HEART RATE: 108 BPM | SYSTOLIC BLOOD PRESSURE: 111 MMHG | BODY MASS INDEX: 34.83 KG/M2

## 2020-10-29 PROCEDURE — G8484 FLU IMMUNIZE NO ADMIN: HCPCS | Performed by: NURSE PRACTITIONER

## 2020-10-29 PROCEDURE — G8427 DOCREV CUR MEDS BY ELIG CLIN: HCPCS | Performed by: NURSE PRACTITIONER

## 2020-10-29 PROCEDURE — 1036F TOBACCO NON-USER: CPT | Performed by: NURSE PRACTITIONER

## 2020-10-29 PROCEDURE — 3046F HEMOGLOBIN A1C LEVEL >9.0%: CPT | Performed by: NURSE PRACTITIONER

## 2020-10-29 PROCEDURE — G8417 CALC BMI ABV UP PARAM F/U: HCPCS | Performed by: NURSE PRACTITIONER

## 2020-10-29 PROCEDURE — 2022F DILAT RTA XM EVC RTNOPTHY: CPT | Performed by: NURSE PRACTITIONER

## 2020-10-29 PROCEDURE — 3017F COLORECTAL CA SCREEN DOC REV: CPT | Performed by: NURSE PRACTITIONER

## 2020-10-29 PROCEDURE — 99214 OFFICE O/P EST MOD 30 MIN: CPT | Performed by: NURSE PRACTITIONER

## 2020-10-29 RX ORDER — SERTRALINE HYDROCHLORIDE 100 MG/1
100 TABLET, FILM COATED ORAL DAILY
Qty: 30 TABLET | Refills: 2 | Status: SHIPPED | OUTPATIENT
Start: 2020-10-29 | End: 2020-11-16 | Stop reason: SDUPTHER

## 2020-10-29 RX ORDER — GABAPENTIN 400 MG/1
400 CAPSULE ORAL 3 TIMES DAILY
Qty: 90 CAPSULE | Refills: 1 | Status: SHIPPED | OUTPATIENT
Start: 2020-10-29 | End: 2020-11-16 | Stop reason: SDUPTHER

## 2020-10-29 ASSESSMENT — ENCOUNTER SYMPTOMS
CONSTIPATION: 0
COUGH: 0
NAUSEA: 0
SHORTNESS OF BREATH: 0
ABDOMINAL PAIN: 0
VOMITING: 0
DIARRHEA: 0

## 2020-10-29 NOTE — PROGRESS NOTES
Disp: , Rfl:     metoprolol tartrate (LOPRESSOR) 50 MG tablet, Take 1 tablet by mouth 2 times daily, Disp: 60 tablet, Rfl: 3    blood glucose monitor kit and supplies, Accu Chek Sheila meter. Use to test blood sugars DX:E11.65, Disp: 1 kit, Rfl: 0    blood glucose monitor strips, Accucheck Sheila Test Strips Test blood sugars 4 times daily DX:E11.65, Disp: 400 strip, Rfl: 5    Lancets MISC, Use to test blood sugars 4 times daily DX:E11.65, Disp: 400 each, Rfl: 5    oxybutynin (DITROPAN) 5 MG tablet, Take 1 tablet by mouth 3 times daily, Disp: 20 tablet, Rfl: 0    levothyroxine (SYNTHROID) 50 MCG tablet, Take 1 tablet by mouth daily, Disp: 30 tablet, Rfl: 5    lisinopril (PRINIVIL;ZESTRIL) 5 MG tablet, Take 1 tablet by mouth daily, Disp: 30 tablet, Rfl: 0    Insulin Syringe-Needle U-100 29G X 1/2\" 0.3 ML MISC, 1 each by Does not apply route 4 times daily (after meals and at bedtime), Disp: 200 each, Rfl: 0    empagliflozin (JARDIANCE) 10 MG tablet, Take 1 tablet by mouth daily (Patient not taking: Reported on 10/29/2020), Disp: 30 tablet, Rfl: 5    The patient is allergic to pcn [penicillins]; actos [pioglitazone]; clindamycin/lincomycin; and vancomycin.     Past Medical History  Susana Antonio  has a past medical history of Bipolar 2 disorder ([facility] [address] BPH (benign prostatic hyperplasia), Diabetes mellitus type 2, uncontrolled ([facility] [address] Diabetic peripheral neuropathy ([facility] [address] Diabetic polyneuropathy ([facility] [address] Diabetic ulcer of right foot associated with type 2 diabetes mellitus ([facility] [address] Essential hypertension, GERD (gastroesophageal reflux disease), Hammer toe of left foot, Heart murmur, History of tobacco abuse, Hx of AKA (above knee amputation), right ([address] Macular edema, diabetic, bilateral ([facility] [address] Marijuana abuse in remission, Melena, MRSA (methicillin resistant staph aureus) culture positive, Onychomycosis, Other disorders of kidney and ureter in diseases classified elsewhere, Sleep apnea, Thyroid disease, and WD-Skin ulcer of 10/08/2021    TSH testing  10/08/2021    Potassium monitoring  10/08/2021    Creatinine monitoring  10/08/2021    Pneumococcal 0-64 years Vaccine  Completed    HIV screen  Completed    Hepatitis A vaccine  Aged Out    Hib vaccine  Aged Out    Meningococcal (ACWY) vaccine  Aged Out       Subjective:      Review of Systems   Constitutional: Negative for chills, fatigue and fever. Eyes: Negative for visual disturbance. Respiratory: Negative for cough and shortness of breath. Cardiovascular: Negative for chest pain and leg swelling. Gastrointestinal: Negative for abdominal pain, constipation, diarrhea, nausea and vomiting. Genitourinary: Negative for difficulty urinating. Musculoskeletal: Negative for arthralgias and myalgias. Skin: Negative for rash and wound. Neurological: Positive for numbness (Left leg, phantom pain right amputated limb). Negative for dizziness, tremors, weakness and light-headedness. Sweating       Objective:     BP (!) 111/55 (Site: Right Upper Arm, Position: Sitting, Cuff Size: Large Adult)   Pulse 108   Temp 98.6 °F (37 °C) (Temporal)   Ht 5' 6\" (1.676 m)   BMI 34.83 kg/m²     Physical Exam  Vitals signs reviewed. Constitutional:       General: He is not in acute distress. Appearance: He is well-developed. He is not diaphoretic. HENT:      Head: Normocephalic and atraumatic. Mouth/Throat:      Pharynx: No oropharyngeal exudate. Neck:      Musculoskeletal: Normal range of motion and neck supple. Thyroid: No thyromegaly. Cardiovascular:      Rate and Rhythm: Normal rate and regular rhythm. Heart sounds: Normal heart sounds. No murmur. No friction rub. No gallop. Pulmonary:      Effort: Pulmonary effort is normal. No respiratory distress. Breath sounds: Normal breath sounds. No wheezing or rales. Abdominal:      General: There is no distension. Palpations: Abdomen is soft. Tenderness:  There is no abdominal daily for 60 days. Dispense: 90 capsule; Refill: 1    2. Neuropathy  - External Referral To Endocrinology  - gabapentin (NEURONTIN) 400 MG capsule; Take 1 capsule by mouth 3 times daily for 60 days. Dispense: 90 capsule; Refill: 1    3. Above knee amputation of right lower extremity ([facility] [address]  With phantom limb pain, believe due to uncontrolled DM. Increase Gabapentin to 400 mg TID>   - External Referral To Endocrinology    4. Generalized hyperhidrosis  - External Referral To Endocrinology    5. Severe episode of recurrent major depressive disorder, without psychotic features ([facility] [address]  Some anger and irritability. Agreeable to increase Zoloft. - sertraline (ZOLOFT) 100 MG tablet; Take 1 tablet by mouth daily  Dispense: 30 tablet; Refill: 2    6. Abdominal pain/diarrhea  Improved on Questran/Imodium, following with GI    7. Left leg swelling/scabbing  Does not appear infected on todays exam, chronic skin changes with scabs to left shin, no drainage. Elevate the leg when sitting, call and get back in with wound clinic for follow up and wraps. Return in about 4 weeks (around 11/26/2020) for Diabetes. Patient given educational materials - see patient instructions. Discussed use, benefit, and side effects of prescribed medications. All patient questions answered. Pt voiced understanding.       Electronically signed MARILYN Black CNP on 10/29/20 at 11:45 AM EDT

## 2020-10-29 NOTE — PATIENT INSTRUCTIONS
Get back on Jardiance and recheck kidney function the next week - 10 days. Increase Zoloft to 100 mg daily - take 2 of the 50 mg tabs you have until gone. Will increase Gabapentin to 400 mg three times daily, let me know how this is working in a couple weeks. Schedule with [facility] endocrinology and use your insurance rides for this. Elevate the leg when sitting, call and get back in with wound clinic for follow up and wraps.

## 2020-11-16 RX ORDER — SERTRALINE HYDROCHLORIDE 100 MG/1
100 TABLET, FILM COATED ORAL DAILY
Qty: 30 TABLET | Refills: 2 | Status: SHIPPED | OUTPATIENT
Start: 2020-11-16 | End: 2021-02-22 | Stop reason: SDUPTHER

## 2020-11-16 RX ORDER — GABAPENTIN 400 MG/1
400 CAPSULE ORAL 3 TIMES DAILY
Qty: 90 CAPSULE | Refills: 1 | Status: SHIPPED | OUTPATIENT
Start: 2020-11-16 | End: 2020-12-26 | Stop reason: SDUPTHER

## 2020-11-16 RX ORDER — EMPAGLIFLOZIN 10 MG/1
1 TABLET, FILM COATED ORAL DAILY
Qty: 30 TABLET | Refills: 5 | Status: SHIPPED | OUTPATIENT
Start: 2020-11-16 | End: 2020-12-24 | Stop reason: SDUPTHER

## 2020-11-16 RX ORDER — LEVOTHYROXINE SODIUM 0.05 MG/1
50 TABLET ORAL DAILY
Qty: 30 TABLET | Refills: 5 | Status: SHIPPED | OUTPATIENT
Start: 2020-11-16 | End: 2021-03-17 | Stop reason: SDUPTHER

## 2020-11-16 RX ORDER — INSULIN LISPRO 100 [IU]/ML
30 INJECTION, SOLUTION INTRAVENOUS; SUBCUTANEOUS
Qty: 5 PEN | Refills: 5 | Status: SHIPPED | OUTPATIENT
Start: 2020-11-16 | End: 2020-12-01 | Stop reason: SDUPTHER

## 2020-11-16 RX ORDER — METOPROLOL TARTRATE 50 MG/1
50 TABLET, FILM COATED ORAL 2 TIMES DAILY
Qty: 60 TABLET | Refills: 0 | Status: SHIPPED | OUTPATIENT
Start: 2020-11-16 | End: 2020-12-04

## 2020-11-18 ENCOUNTER — TELEPHONE (OUTPATIENT)
Dept: CARDIOLOGY CLINIC | Age: 50
End: 2020-11-18

## 2020-11-18 NOTE — TELEPHONE ENCOUNTER
Left several messages for the patient to call me to get his monitor scheduled.   He is not returning my calls

## 2020-11-30 ENCOUNTER — HOSPITAL ENCOUNTER (EMERGENCY)
Age: 50
Discharge: HOME | End: 2020-11-30
Payer: MEDICARE

## 2020-11-30 VITALS
DIASTOLIC BLOOD PRESSURE: 67 MMHG | HEART RATE: 100 BPM | TEMPERATURE: 98 F | RESPIRATION RATE: 18 BRPM | OXYGEN SATURATION: 99 % | SYSTOLIC BLOOD PRESSURE: 151 MMHG

## 2020-11-30 LAB
ANION GAP SERPL CALCULATED.3IONS-SCNC: 14 MEQ/L (ref 8–16)
BASOPHILS # BLD: 0.8 %
BASOPHILS ABSOLUTE: 0.1 THOU/MM3 (ref 0–0.1)
BUN BLDV-MCNC: 15 MG/DL (ref 7–22)
CALCIUM SERPL-MCNC: 9.5 MG/DL (ref 8.5–10.5)
CHLORIDE BLD-SCNC: 97 MEQ/L (ref 98–111)
CO2: 22 MEQ/L (ref 23–33)
CREAT SERPL-MCNC: 0.7 MG/DL (ref 0.4–1.2)
EOSINOPHIL # BLD: 3.9 %
EOSINOPHILS ABSOLUTE: 0.4 THOU/MM3 (ref 0–0.4)
ERYTHROCYTE [DISTWIDTH] IN BLOOD BY AUTOMATED COUNT: 13.9 % (ref 11.5–14.5)
ERYTHROCYTE [DISTWIDTH] IN BLOOD BY AUTOMATED COUNT: 45.6 FL (ref 35–45)
GFR SERPL CREATININE-BSD FRML MDRD: > 90 ML/MIN/1.73M2
GLUCOSE BLD-MCNC: 338 MG/DL (ref 70–108)
HCT VFR BLD CALC: 42.6 % (ref 42–52)
HEMOGLOBIN: 15.1 GM/DL (ref 14–18)
IMMATURE GRANS (ABS): 0.07 THOU/MM3 (ref 0–0.07)
IMMATURE GRANULOCYTES: 0.7 %
LYMPHOCYTES # BLD: 22.6 %
LYMPHOCYTES ABSOLUTE: 2.4 THOU/MM3 (ref 1–4.8)
MCH RBC QN AUTO: 32.1 PG (ref 26–33)
MCHC RBC AUTO-ENTMCNC: 35.4 GM/DL (ref 32.2–35.5)
MCV RBC AUTO: 90.4 FL (ref 80–94)
MONOCYTES # BLD: 7.9 %
MONOCYTES ABSOLUTE: 0.8 THOU/MM3 (ref 0.4–1.3)
NUCLEATED RED BLOOD CELLS: 0 /100 WBC
OSMOLALITY CALCULATION: 280.5 MOSMOL/KG (ref 275–300)
PLATELET # BLD: 226 THOU/MM3 (ref 130–400)
PMV BLD AUTO: 9.5 FL (ref 9.4–12.4)
POTASSIUM SERPL-SCNC: 4.3 MEQ/L (ref 3.5–5.2)
RBC # BLD: 4.71 MILL/MM3 (ref 4.7–6.1)
SEG NEUTROPHILS: 64.1 %
SEGMENTED NEUTROPHILS ABSOLUTE COUNT: 6.9 THOU/MM3 (ref 1.8–7.7)
SODIUM BLD-SCNC: 133 MEQ/L (ref 135–145)
WBC # BLD: 10.7 THOU/MM3 (ref 4.8–10.8)

## 2020-11-30 PROCEDURE — 87040 BLOOD CULTURE FOR BACTERIA: CPT

## 2020-11-30 PROCEDURE — 87205 SMEAR GRAM STAIN: CPT

## 2020-11-30 PROCEDURE — 99283 EMERGENCY DEPT VISIT LOW MDM: CPT

## 2020-11-30 PROCEDURE — 80048 BASIC METABOLIC PNL TOTAL CA: CPT

## 2020-11-30 PROCEDURE — 87070 CULTURE OTHR SPECIMN AEROBIC: CPT

## 2020-11-30 PROCEDURE — 87147 CULTURE TYPE IMMUNOLOGIC: CPT

## 2020-11-30 PROCEDURE — 87077 CULTURE AEROBIC IDENTIFY: CPT

## 2020-11-30 PROCEDURE — 87186 SC STD MICRODIL/AGAR DIL: CPT

## 2020-11-30 PROCEDURE — 85025 COMPLETE CBC W/AUTO DIFF WBC: CPT

## 2020-11-30 PROCEDURE — 87075 CULTR BACTERIA EXCEPT BLOOD: CPT

## 2020-11-30 PROCEDURE — 36415 COLL VENOUS BLD VENIPUNCTURE: CPT

## 2020-11-30 RX ORDER — GINSENG 100 MG
CAPSULE ORAL
Status: DISCONTINUED
Start: 2020-11-30 | End: 2020-11-30 | Stop reason: HOSPADM

## 2020-11-30 ASSESSMENT — PAIN DESCRIPTION - ORIENTATION: ORIENTATION: LEFT

## 2020-11-30 ASSESSMENT — PAIN DESCRIPTION - PAIN TYPE: TYPE: ACUTE PAIN

## 2020-11-30 ASSESSMENT — PAIN DESCRIPTION - LOCATION: LOCATION: LEG

## 2020-12-01 RX ORDER — INSULIN LISPRO 100 [IU]/ML
30 INJECTION, SOLUTION INTRAVENOUS; SUBCUTANEOUS
Qty: 5 PEN | Refills: 5 | Status: SHIPPED | OUTPATIENT
Start: 2020-12-01 | End: 2020-12-04

## 2020-12-02 ENCOUNTER — HOSPITAL ENCOUNTER (OUTPATIENT)
Dept: WOUND CARE | Age: 50
Discharge: HOME | End: 2020-12-02
Payer: MEDICARE

## 2020-12-02 ENCOUNTER — TELEPHONE (OUTPATIENT)
Dept: INTERNAL MEDICINE CLINIC | Age: 50
End: 2020-12-02

## 2020-12-02 VITALS
SYSTOLIC BLOOD PRESSURE: 141 MMHG | DIASTOLIC BLOOD PRESSURE: 73 MMHG | RESPIRATION RATE: 18 BRPM | OXYGEN SATURATION: 98 % | HEART RATE: 98 BPM | TEMPERATURE: 98 F

## 2020-12-02 PROBLEM — M79.605 ACUTE LEG PAIN, LEFT: Status: RESOLVED | Noted: 2020-10-02 | Resolved: 2020-12-02

## 2020-12-02 PROCEDURE — 99213 OFFICE O/P EST LOW 20 MIN: CPT

## 2020-12-02 PROCEDURE — 99213 OFFICE O/P EST LOW 20 MIN: CPT | Performed by: NURSE PRACTITIONER

## 2020-12-02 ASSESSMENT — ENCOUNTER SYMPTOMS
COUGH: 0
WHEEZING: 0
EYES NEGATIVE: 1
CHEST TIGHTNESS: 0
SHORTNESS OF BREATH: 0

## 2020-12-02 ASSESSMENT — PAIN SCALES - GENERAL: PAINLEVEL_OUTOF10: 4

## 2020-12-02 NOTE — ED PROVIDER NOTES
positive, Onychomycosis, Other disorders of kidney and ureter in diseases classified elsewhere, Sleep apnea, Thyroid disease, and WD-Skin ulcer of fourth toe of right foot with necrosis of bone ([facility] [address] SURGICAL HISTORY      has a past surgical history that includes other surgical history (Right, 1/14/15); Abscess Drainage (Right); Toe amputation (Right, 1/16/15); Foot Debridement (Right, 07/01/2016); Leg amputation below knee (Right, 07/20/2016); Carlock tooth extraction (?when); pr drain infect shoulder bursa (Left, 8/18/2017); pr office/outpt visit,procedure only (Right, 9/20/2018); incision and drainage (Right, 2/18/2019); Leg amputation below knee (Right, 4/4/2019); AMPUTATION ABOVE KNEE (Right, 4/18/2019); Upper gastrointestinal endoscopy (N/A, 3/3/2020); Cholecystectomy, laparoscopic (N/A, 4/1/2020); Endoscopy, colon, diagnostic; Cystoscopy (N/A, 7/20/2020); and Cystoscopy (N/A, 8/21/2020). CURRENT MEDICATIONS       Discharge Medication List as of 11/30/2020  4:17 PM      CONTINUE these medications which have NOT CHANGED    Details   metoprolol tartrate (LOPRESSOR) 50 MG tablet Take 1 tablet by mouth 2 times daily, Disp-60 tablet,R-0Normal      levothyroxine (SYNTHROID) 50 MCG tablet Take 1 tablet by mouth daily, Disp-30 tablet,R-5Normal      empagliflozin (JARDIANCE) 10 MG tablet Take 1 tablet by mouth daily, Disp-30 tablet,R-5Normal      Insulin Glargine, 2 Unit Dial, 300 UNIT/ML SOPN Inject 86 Units into the skin 2 times daily, Disp-5 pen,R-5Normal      sertraline (ZOLOFT) 100 MG tablet Take 1 tablet by mouth daily, Disp-30 tablet,R-2Normal      gabapentin (NEURONTIN) 400 MG capsule Take 1 capsule by mouth 3 times daily for 60 days. , Disp-90 capsule,R-1Normal      insulin lispro, 1 Unit Dial, (HUMALOG KWIKPEN) 100 UNIT/ML SOPN Inject 30 Units into the skin 3 times daily (before meals), Disp-5 pen,R-5Normal      ondansetron (ZOFRAN ODT) 4 MG disintegrating tablet Take 1 tablet by mouth every 8 hours as needed for Nausea or Vomiting, Disp-20 tablet,R-0Normal      pantoprazole (PROTONIX) 40 MG tablet Take 40 mg by mouth dailyHistorical Med      blood glucose monitor kit and supplies Accu Chek Sheila meter. Use to test blood sugars DX:E11.65, Disp-1 kit,R-0, Normal      blood glucose monitor strips Accucheck Sheila Test Strips Test blood sugars 4 times daily DX:, Disp-400 strip,R-5, Normal      Lancets MISC Disp-400 each,R-5, NormalUse to test blood sugars 4 times daily DX:E11.65      oxybutynin (DITROPAN) 5 MG tablet Take 1 tablet by mouth 3 times daily, Disp-20 tablet,R-0Print      lisinopril (PRINIVIL;ZESTRIL) 5 MG tablet Take 1 tablet by mouth daily, Disp-30 tablet, R-0Normal      Insulin Syringe-Needle U-100 29G X 1/2\" 0.3 ML MISC 4 TIMES DAILY AFTER MEALS AND BEFORE BEDTIME Starting 2019, Disp-200 each, R-0, Normal             ALLERGIES     is allergic to pcn [penicillins]; actos [pioglitazone]; clindamycin/lincomycin; and vancomycin. FAMILY HISTORY     He indicated that his mother is . He reported the following about his father: unknown. He indicated that the status of his maternal grandmother is unknown. He indicated that the status of his maternal grandfather is unknown.   family history includes Arthritis in his maternal grandfather; Depression in his mother; Diabetes in his mother; Early Death in his mother; Heart Disease in his maternal grandfather; High Blood Pressure in his mother; High Cholesterol in his mother; Other in his mother; Vision Loss in his maternal grandmother. SOCIAL HISTORY      reports that he quit smoking about 20 years ago. His smoking use included cigars. He started smoking about 35 years ago. He has a 10.00 pack-year smoking history. He has never used smokeless tobacco. He reports previous alcohol use. He reports that he does not use drugs. PHYSICAL EXAM     INITIAL VITALS:  oral temperature is 98 °F (36.7 °C).  His blood pressure is 151/67 (abnormal) and his pulse is 100. His respiration is 18 and oxygen saturation is 99%. Physical Exam  Constitutional:       Appearance: Normal appearance. HENT:      Head: Normocephalic and atraumatic. Nose: Nose normal.      Mouth/Throat:      Mouth: Mucous membranes are moist.      Pharynx: Oropharynx is clear. Eyes:      Pupils: Pupils are equal, round, and reactive to light. Neck:      Musculoskeletal: Normal range of motion and neck supple. Cardiovascular:      Rate and Rhythm: Normal rate. Pulses: Normal pulses. Heart sounds: Normal heart sounds. Abdominal:      General: Abdomen is flat. Bowel sounds are normal.      Palpations: Abdomen is soft. Skin:     General: Skin is warm and dry. Capillary Refill: Capillary refill takes less than 2 seconds. Findings: Lesion present. Neurological:      Mental Status: He is alert.           DIFFERENTIAL DIAGNOSIS:   Cellulitis, diabetic neuropathy, sepsis, wound infection    DIAGNOSTIC RESULTS     EKG: All EKG's are interpreted by the Emergency Department Physician who either signs or Co-signs this chart in the absence of a cardiologist.    None    RADIOLOGY: non-plainfilm images(s) such as CT, Ultrasound and MRI are read by the radiologist.    No orders to display       LABS:     Labs Reviewed   CULTURE, ANAEROBIC AND AEROBIC - Abnormal; Notable for the following components:       Result Value    Organism Staphylococcus aureus (*)     All other components within normal limits    Narrative:     Source: wound       Site: RLL          Current Antibiotics: not stated   CBC WITH AUTO DIFFERENTIAL - Abnormal; Notable for the following components:    RDW-SD 45.6 (*)     All other components within normal limits   BASIC METABOLIC PANEL - Abnormal; Notable for the following components:    Sodium 133 (*)     Chloride 97 (*)     CO2 22 (*)     Glucose 338 (*)     All other components within normal limits   CULTURE, BLOOD 1    Narrative:     Source: blood-Adult-suboptimal <5.5oz./set volume       Site: (single bottle)Peripheral Vein            Current Antibiotics: not stated   CULTURE, BLOOD 2    Narrative:     Source: blood-Adult-suboptimal <5.5oz./set volume       Site: Peripheral Vein(single bottle)            Current Antibiotics: not stated   ANION GAP   GLOMERULAR FILTRATION RATE, ESTIMATED   OSMOLALITY       EMERGENCY DEPARTMENT COURSE:   Vitals:    Vitals:    11/30/20 1412   BP: (!) 151/67   Pulse: 100   Resp: 18   Temp: 98 °F (36.7 °C)   TempSrc: Oral   SpO2: 99%       5:32 PM EST: The patient was seen and evaluated. MDM:  Patient seen and evaluated today for worsening infection of left leg. Been dealing with this for 3 months. Wound culture obtained and sent. Contacted patient's infectious disease doctor: Dr. Danilo Tavares who recommended the patient follow-up in his wound clinic on Wednesday. Recommended wound culture, did not wish patient to be started on antibiotic at this time, wishes to wait for wound culture to result. Wound was cleaned, dressing was applied, discussed this plan with patient and he was amenable. Patient departed the ED in stable condition    CRITICAL CARE:   None    CONSULTS:  Dr. Trejo Mater disease    PROCEDURES:  None    FINAL IMPRESSION      1. Cellulitis and abscess of leg          DISPOSITION/PLAN   Discharge    PATIENT REFERRED TO:  [place] [address] [address]  [phone]    Schedule an appointment as soon as possible for a visit in 2 days        DISCHARGE MEDICATIONS:  Discharge Medication List as of 11/30/2020  4:17 PM          (Please note that portions of this note were completed with a voice recognition program.  Efforts were made to edit the dictations but occasionally words are mis-transcribed.)    The patient was given an opportunity to see the Emergency Attending.  The patient voiced understanding that I was a Mid-LevelProvider and was in agreement with being seen independently by myself.      MARILYN Mercado CNP, 12/2/20, 5:37 PM        MARILYN Mercado CNP  12/02/20 [id number]

## 2020-12-02 NOTE — PROGRESS NOTES
Kiera PHELPS has reviewed and agrees with Fidelina SÁNCHEZ's shift  Assessment.     Sincereald Line  12/2/2020

## 2020-12-02 NOTE — TELEPHONE ENCOUNTER
Remind pt to bring all meds and his meter to his appt tomorrow please. We need to get his sugars under control.

## 2020-12-02 NOTE — PROGRESS NOTES
[address] [place] [address] and Procedure Note      [address] [facility] RECORD NUMBER:  [id number]  AGE: 44 y.o. GENDER: male  : 1970  EPISODE DATE:  2020    SUBJECTIVE:     Chief Complaint   Patient presents with    Wound Check     left leg      HISTORY OF PRESENT ILLNESS      Aidee Guerrero is a 44 y.o. male who presents today for re-evaluation of left leg wounds. Claudia Maldonado has previously followed with clinic for wound, last seen on 10/2/2020. Did not follow up as directed. Wounds are reported to occur related to bumping into objects. Claudia Maldonado states that he was evaluated in the emergency department on Monday due to increased redness to his left lower leg. Was evaluated and discharged. No notes available for review from ER encounter. Claudia Maldonado states that he has been covering wound with ABD pad. Moderate amounts of serosanguinous drainage reported. Does not use compression or elevate leg as recommended. States that his blood glucose levels have been elevate, that he \"is working on them. \"  No further concerns identified. PAST MEDICAL HISTORY             Diagnosis Date    Bipolar 2 disorder [facility])     previously followed with Dr. Gustavo Ocampo and Wes Oliveira in [facility] BPH (benign prostatic hyperplasia)     Diabetes mellitus type 2, uncontrolled ([address]     HgbA1c on 2019 was 9.1.     Diabetic peripheral neuropathy (HCC)     Diabetic polyneuropathy ([address]     Diabetic ulcer of right foot associated with type 2 diabetes mellitus ([address] 12/10/2015    Essential hypertension     \"never been on b/p medication that I know of\"    GERD (gastroesophageal reflux disease)     Hammer toe of left foot     Heart murmur     denies any chest pain or palpitations    History of tobacco abuse     Hx of AKA (above knee amputation), right ([address] 2019    Dr. Tasneem Gu Macular edema, diabetic, bilateral ([address] 2018    Dr. Anthony Howard referred to retina specialist for 2nd H1N1    Depression Mother     Early Death Mother     High Blood Pressure Mother     High Cholesterol Mother     Vision Loss Maternal Grandmother     Arthritis Maternal Grandfather     Heart Disease Maternal Grandfather        SOCIAL HISTORY     Social History     Tobacco Use    Smoking status: Former Smoker     Packs/day: 1.00     Years: 10.00     Pack years: 10.00     Types: Cigars     Start date: 1985     Last attempt to quit: 2000     Years since quittin.2    Smokeless tobacco: Never Used   Substance Use Topics    Alcohol use: Not Currently     Alcohol/week: 0.0 standard drinks    Drug use: No     Comment: 30 YEARS AGO       ALLERGIES     Allergies   Allergen Reactions    Pcn [Penicillins] Shortness Of Breath, Nausea And Vomiting and Other (See Comments)     Does not remember reactions. Has TOLERATED amoxicillin and several different cephalosporins.  Actos [Pioglitazone] Swelling    Clindamycin/Lincomycin Itching    Vancomycin Hives      Rash, with erythematous plaques to abdomen, shoulders, and proximal upper extremities with pruritis, persisted with 2nd dose administered slower. MEDICATIONS     Current Outpatient Medications on File Prior to Encounter   Medication Sig Dispense Refill    insulin lispro, 1 Unit Dial, (HUMALOG KWIKPEN) 100 UNIT/ML SOPN Inject 30 Units into the skin 3 times daily (before meals) 5 pen 5    metoprolol tartrate (LOPRESSOR) 50 MG tablet Take 1 tablet by mouth 2 times daily 60 tablet 0    levothyroxine (SYNTHROID) 50 MCG tablet Take 1 tablet by mouth daily 30 tablet 5    empagliflozin (JARDIANCE) 10 MG tablet Take 1 tablet by mouth daily 30 tablet 5    Insulin Glargine, 2 Unit Dial, 300 UNIT/ML SOPN Inject 86 Units into the skin 2 times daily 5 pen 5    sertraline (ZOLOFT) 100 MG tablet Take 1 tablet by mouth daily 30 tablet 2    gabapentin (NEURONTIN) 400 MG capsule Take 1 capsule by mouth 3 times daily for 60 days.  90 capsule 1    ondansetron (ZOFRAN ODT) 4 MG disintegrating tablet Take 1 tablet by mouth every 8 hours as needed for Nausea or Vomiting 20 tablet 0    pantoprazole (PROTONIX) 40 MG tablet Take 40 mg by mouth daily      blood glucose monitor kit and supplies Accu Chek Sheila meter. Use to test blood sugars DX:E11.65 1 kit 0    blood glucose monitor strips Accucheck Sheila Test Strips Test blood sugars 4 times daily DX:E11.65 400 strip 5    Lancets MISC Use to test blood sugars 4 times daily DX:E11.65 400 each 5    oxybutynin (DITROPAN) 5 MG tablet Take 1 tablet by mouth 3 times daily 20 tablet 0    lisinopril (PRINIVIL;ZESTRIL) 5 MG tablet Take 1 tablet by mouth daily 30 tablet 0    Insulin Syringe-Needle U-100 29G X 1/2\" 0.3 ML MISC 1 each by Does not apply route 4 times daily (after meals and at bedtime) 200 each 0     No current facility-administered medications on file prior to encounter. REVIEW OF SYSTEMS:     Constitutional: Denies fever, chills, night sweats  Head: Denies headache,  dizziness, loss of consciousness  Respiratory: Denies shortness of breath, cough, wheezing, dyspnea with exertion  Cardiovascular:Denies chest pain, palpitations  Musculoskeletal: +Right AKA, +LLE edema Denies joint pain, stiffness  Hematology: Denies easy brusing or bleeding, hx of clotting disorder  Dermatology: +Left shin wound Denies skin rash, eczema, pruritis    PHYSICAL EXAM:     There were no vitals taken for this visit. Wt Readings from Last 3 Encounters:   10/07/20 215 lb 12.8 oz (97.9 kg)   09/17/20 212 lb (96.2 kg)   09/14/20 212 lb (96.2 kg)     General:  Awake, alert, no apparent distress.  Appears stated age  HEENT: conjuctivae are clear without exudate or hemorrhage, anicteric sclera, moist oral mucosa.   Chest:  Respirations regular, non-labored.  Chest rise and fall equal bilaterally.    Neurological: Awake, alert, oriented x4  Psychiatric:  Appropriate mood and affect  Extremities: Right AKA.  3+ pitting edema to left lower extremity.  Dependent rubor.  Capillary refill greater than 3 seconds. Skin:  Warm and dry     Wound:                Location:  L lower leg anterior              Classification/stage: traumatic              Tunneling/undermining: Not Present              INAFY bed: granulation tissue              Exudate:  moderate serosanguinous              Louise-wound: edematous, denuded              Complications[de-identified] uncomplicated              Pain: Denies              -wound margins intact and healing well. No surrounding erythema or warmth to the touch indicating infectious process.         Wound:     Location:  L lower leg medial   Classification/stage: traumatic   Tunneling/undermining: Not Present   Wound bed: granulation   Exudate:  moderate, serosanguinous   Louise-wound:edematous, denuded   Complications[de-identified] uncomplicated   Pain: Denies   -wound margins intact and healing well. No surrounding erythema or warmth to the touch indicating infectious process.        Wound 09/18/20 Leg Left (Active)   Wound Image   12/02/20 0925   Wound Etiology Traumatic 10/02/20 1006   Wound Cleansed Cleansed with saline 12/02/20 0925   Dressing/Treatment Alginate with Ag;ABD;Coban;Dry dressing 09/18/20 1044   Offloading for Diabetic Foot Ulcers Wheelchair 10/02/20 1006   Wound Length (cm) 2 cm 12/02/20 0925   Wound Width (cm) 1.5 cm 12/02/20 0925   Wound Depth (cm) 0.01 cm 12/02/20 0925   Wound Surface Area (cm^2) 3 cm^2 12/02/20 0925   Change in Wound Size % (l*w) 25 12/02/20 0925   Wound Volume (cm^3) 0.03 cm^3 12/02/20 0925   Wound Assessment Granulation tissue;Dry;Denuded 12/02/20 0925   Drainage Amount Moderate 12/02/20 0925   Drainage Description Serosanguinous 12/02/20 0925   Odor None 12/02/20 0925   Louise-wound Assessment Induration;Blanchable erythema; Hyperpigmented 12/02/20 0925   Margins Attached edges 12/02/20 0925   Wound Thickness Description not for Pressure Injury Not applicable 87/82/37 1293   Number of days: 75 Wound 12/02/20 Pretibial Left;Medial (Active)   Dressing Status Intact; Old drainage noted 12/02/20 0925   Wound Cleansed Cleansed with saline 12/02/20 0925   Wound Length (cm) 2.2 cm 12/02/20 0925   Wound Width (cm) 1 cm 12/02/20 0925   Wound Depth (cm) 0.1 cm 12/02/20 0925   Wound Surface Area (cm^2) 2.2 cm^2 12/02/20 0925   Wound Volume (cm^3) 0.22 cm^3 12/02/20 0925   Wound Assessment Granulation tissue; Epithelialization;Denuded 12/02/20 0925   Drainage Amount Moderate 12/02/20 0925   Drainage Description Serosanguinous 12/02/20 0925   Odor None 12/02/20 0925   Margins Attached edges 12/02/20 0925   Number of days: 0         LABS     CBC:   Recent Labs     11/30/20  1631   WBC 10.7   HGB 15.1        BMP:    Recent Labs     11/30/20  1631   *   K 4.3   CL 97*   CO2 22*   BUN 15   CREATININE 0.7   GLUCOSE 338*     Calcium:  Micro:   Lab Results   Component Value Date    BC No growth-preliminary  11/30/2020       ASSESSMENT     -Traumatic open wound of left lower leg with delayed healing  -edema of left lower extremity  -acute left leg pain      Ulcer Identification:  Ulcer Type: traumatic  Contributing Factors: edema, poor glucose control, decreased mobility, shear force, obesity and non-adherence     Patient Active Problem List   Diagnosis Code    Status post below knee amputation of right lower extremity ([facility] Z89.511    Gait disturbance R26.9    Sebaceous cyst L72.3    Uncontrolled type 2 diabetes mellitus with hyperglycemia, with long-term current use of insulin ([facility]) E11.65, Z79.4    Severe bipolar II disorder, recent episode major depressive, remission ([facility]) F31.81    Bipolar 1 disorder ([facility]) F31.9    Leukocytosis D72.829    Lactic acidosis E87.2    Hyponatremia E87.1    Normocytic anemia D64.9    Obesity (BMI 30-39. 9) E66.9    History of noncompliance with medical treatment Z91.19    Essential hypertension I10    Tobacco dependence F17.200    Gastroesophageal reflux disease silver alginate to wound bed. Cover with ABD, wrap with kerlix and coban. Wrap to start at base of toes up to 1-2 inches below the bend of the knee. Change three times weekly. Antibiotics: Culture as taken in ED pending at this time. No indication of acute infectious process at today's visit indicating need to start antibiotic prior to finalization of report. Discussed signs/symptoms of infection with Yeyo Padilla. Call clinic if these should occur.    -Follow up 2 weeks for re-evaluation.  Call clinic with any needs or concerns prior to scheduled visit. All questions and concerns addressed prior to discharge from today's visit. Please see attached Discharge Instructions    Written patient dismissal instructions given to patient and signed by patient or POA. Discharge Instructions     Discharge Instructions       Visit Discharge/Physician Orders:  - elevate legs through out the day to reduce swelling  - continue to work on blood sugars  - Will order supplies for you today through [employer]     Wound Location: left leg     Dressing orders:      1) Gather wound care supplies and arrange on clean table.      2) Wash your hands with soap and water or use alcohol based hand  for 20 seconds (sing \"Happy Birthday\" twice).    3) Cleanse wounds with normal saline or wound cleanser and gauze. Pat dry with clean gauze.    4) left leg- Apply silver alginate to wound bed. Cover with ABD, wrap with kerlix and coban. Wrap to start at base of toes up to 1-2 inches below the bend of the knee. Change three times weekly.      Keep all dressings clean & dry.     Do not shower, take baths or get wound wet, unless otherwise instructed by your Wound Care doctor.      Follow up visit:   2 Weeks 12/8/2020 at 11:00     Keep next scheduled appointment. Please give 24 hour notice if unable to keep appointment.  [phone]     If you experience any of the following, please call the Wound Care Service during business hours: Monday through Friday 8:00 am - 4:30 pm  ([phone]).               *Increase in pain              *Temperature over 101              *Increase in drainage from your wound or a foul odor              *Uncontrolled swelling              *Need for compression bandage changes due to slippage, breakthrough drainage     If you need medical attention outside of business hours, please contact your Primary Care Doctor or go to the nearest emergency room.         Electronically signed by MARILYN Sanchez CNP on 12/2/2020 at 9:58 AM

## 2020-12-02 NOTE — PROGRESS NOTES
[address] f: [phone] f: [phone] p: [phone] [email or web address]      Ordering Center:     [facility] [address]  [facility] [address]  [phone]  WOUND CARE Dept: [phone]   Valentinaa Good NUMBER [phone]    Patient Information:      Marcelo Doe  [address] [facility]  [address] [id number] [phone]   : 1970  AGE: 44 y.o. GENDER: male   EPISODE DATE: 2020    Insurance:      PRIMARY INSURANCE:  Plan: Rojelio Ache COMPLETE  Coverage: [facility] MEDICARE  Effective Date: 2020  Group Number: [unfilled]  Subscriber Number: [id number] - (Medicare Managed)    Payor/Plan Subscr  Sex Relation Sub. Ins. ID Effective Group Num   1. [address] [facility] L 1970 Male Self [id number]                                   [address]   2. [place]* FABIÁN PENA L 1970 Male Self [id number]                                    [address]       Patient Wound Information:      Problem List Items Addressed This Visit     None          WOUNDS REQUIRING DRESSING SUPPLIES:     Wound 20 Leg Left (Active)   Wound Image   20 0925   Wound Etiology Traumatic 10/02/20 1006   Dressing Status Intact; Old drainage noted 20 0925   Wound Cleansed Cleansed with saline 20 0925   Dressing/Treatment ABD; Alginate with Ag; Ace wrap;Roll gauze 20 0925   Offloading for Diabetic Foot Ulcers Wheelchair 10/02/20 1006   Wound Length (cm) 2 cm 20 0925   Wound Width (cm) 1.5 cm 20 0925   Wound Depth (cm) 0.01 cm 20 0925   Wound Surface Area (cm^2) 3 cm^2 20 0925   Change in Wound Size % (l*w) 25 20 0925   Wound Volume (cm^3) 0.03 cm^3 20   Wound Assessment Granulation tissue;Dry;Denuded 20   Drainage Amount Moderate 20   Drainage Description Serosanguinous 20 Odor None 12/02/20 0925   Louise-wound Assessment Induration;Blanchable erythema; Hyperpigmented 12/02/20 0925   Margins Attached edges 12/02/20 0925   Wound Thickness Description not for Pressure Injury Not applicable 73/07/20 6999   Number of days: 75       Wound 12/02/20 Pretibial Left;Medial (Active)   Dressing Status Intact; Old drainage noted 12/02/20 0925   Wound Cleansed Cleansed with saline 12/02/20 0925   Dressing/Treatment ABD; Ace wrap;Alginate with Ag;Roll gauze 12/02/20 0925   Wound Length (cm) 2.2 cm 12/02/20 0925   Wound Width (cm) 1 cm 12/02/20 0925   Wound Depth (cm) 0.1 cm 12/02/20 0925   Wound Surface Area (cm^2) 2.2 cm^2 12/02/20 0925   Wound Volume (cm^3) 0.22 cm^3 12/02/20 0925   Wound Assessment Granulation tissue; Epithelialization;Denuded 12/02/20 0925   Drainage Amount Moderate 12/02/20 0925   Drainage Description Serosanguinous 12/02/20 0925   Odor None 12/02/20 0925   Margins Attached edges 12/02/20 0925   Number of days: 0          Supplies Requested :      WOUND #: 1   PRIMARY DRESSING:  Alginate with silver pad   Cover and Secure with: ABD pad  Conforming roll gauze  Coban     FREQUENCY OF DRESSING CHANGES:  Three times per week       WOUND #: 2   PRIMARY DRESSING:  Alginate with silver pad   Cover and Secure with: ABD pad  Conforming roll gauze  Coban     FREQUENCY OF DRESSING CHANGES:  Three times per week                     ADDITIONAL ITEMS:  [] Gloves Small  [x] Gloves Medium [] Gloves Large [] Gloves XLarge  [] Tape 1\" [x] Tape 2\" [] Tape 3\"  [] Medipore Tape  [x] Saline  [] Skin Prep   [] Adhesive Remover   [] Cotton Tip Applicators   [] Other:    Patient Wound(s) Debrided: [x] Yes if yes please add date 12/02/2020   [] No    Debribement Type: Mechanical     Patient currently being seen by Home Health: [] Yes   [x] No    Duration for needed supplies:  []15  [x]30  []60  [x]90 Days    Electronically signed by Adwoa Forst LPN on 29/8/8351 at 60:53 PM     Provider Information: PROVIDER'S NAME: Edd Claire CNP     NPI: [id number]

## 2020-12-02 NOTE — PLAN OF CARE
Problem: Wound:  Goal: Will show signs of wound healing; wound closure and no evidence of infection  Description: Will show signs of wound healing; wound closure and no evidence of infection  Outcome: Ongoing   Patient seen left leg wounds. Wounds are showing signs of proper closure and healing. No s/s of infection noted. Follow up in 2 weeks. See AVS for order changes. Patient refused proper wound dressing application. Patient only allowed dressing from the ankle to mid calf to be applied. Patient refused to take off shoe for dressing to be applied from base of toes to the knee. Care plan reviewed with patient. Patient verbalize understanding of the plan of care and contribute to goal setting.

## 2020-12-03 ENCOUNTER — OFFICE VISIT (OUTPATIENT)
Dept: INTERNAL MEDICINE CLINIC | Age: 50
End: 2020-12-03
Payer: MEDICARE

## 2020-12-03 VITALS
BODY MASS INDEX: 34.55 KG/M2 | SYSTOLIC BLOOD PRESSURE: 132 MMHG | HEART RATE: 111 BPM | WEIGHT: 215 LBS | HEIGHT: 66 IN | TEMPERATURE: 98.7 F | DIASTOLIC BLOOD PRESSURE: 62 MMHG

## 2020-12-03 PROCEDURE — 3017F COLORECTAL CA SCREEN DOC REV: CPT | Performed by: NURSE PRACTITIONER

## 2020-12-03 PROCEDURE — 1036F TOBACCO NON-USER: CPT | Performed by: NURSE PRACTITIONER

## 2020-12-03 PROCEDURE — G8417 CALC BMI ABV UP PARAM F/U: HCPCS | Performed by: NURSE PRACTITIONER

## 2020-12-03 PROCEDURE — 99214 OFFICE O/P EST MOD 30 MIN: CPT | Performed by: NURSE PRACTITIONER

## 2020-12-03 PROCEDURE — 3046F HEMOGLOBIN A1C LEVEL >9.0%: CPT | Performed by: NURSE PRACTITIONER

## 2020-12-03 PROCEDURE — G8427 DOCREV CUR MEDS BY ELIG CLIN: HCPCS | Performed by: NURSE PRACTITIONER

## 2020-12-03 PROCEDURE — G8484 FLU IMMUNIZE NO ADMIN: HCPCS | Performed by: NURSE PRACTITIONER

## 2020-12-03 PROCEDURE — 2022F DILAT RTA XM EVC RTNOPTHY: CPT | Performed by: NURSE PRACTITIONER

## 2020-12-03 NOTE — PROGRESS NOTES
  levothyroxine (SYNTHROID) 50 MCG tablet, Take 1 tablet by mouth daily, Disp: 30 tablet, Rfl: 5    empagliflozin (JARDIANCE) 10 MG tablet, Take 1 tablet by mouth daily, Disp: 30 tablet, Rfl: 5    sertraline (ZOLOFT) 100 MG tablet, Take 1 tablet by mouth daily, Disp: 30 tablet, Rfl: 2    gabapentin (NEURONTIN) 400 MG capsule, Take 1 capsule by mouth 3 times daily for 60 days. , Disp: 90 capsule, Rfl: 1    ondansetron (ZOFRAN ODT) 4 MG disintegrating tablet, Take 1 tablet by mouth every 8 hours as needed for Nausea or Vomiting, Disp: 20 tablet, Rfl: 0    pantoprazole (PROTONIX) 40 MG tablet, Take 40 mg by mouth daily, Disp: , Rfl:     blood glucose monitor kit and supplies, Accu Chek Sheila meter. Use to test blood sugars DX:E11.65, Disp: 1 kit, Rfl: 0    blood glucose monitor strips, Accucheck Sheila Test Strips Test blood sugars 4 times daily DX:E11.65, Disp: 400 strip, Rfl: 5    Lancets MISC, Use to test blood sugars 4 times daily DX:E11.65, Disp: 400 each, Rfl: 5    oxybutynin (DITROPAN) 5 MG tablet, Take 1 tablet by mouth 3 times daily, Disp: 20 tablet, Rfl: 0    lisinopril (PRINIVIL;ZESTRIL) 5 MG tablet, Take 1 tablet by mouth daily, Disp: 30 tablet, Rfl: 0    Insulin Syringe-Needle U-100 29G X 1/2\" 0.3 ML MISC, 1 each by Does not apply route 4 times daily (after meals and at bedtime), Disp: 200 each, Rfl: 0    metoprolol tartrate (LOPRESSOR) 50 MG tablet, Take 1.5 tablets by mouth 2 times daily, Disp: 90 tablet, Rfl: 3    The patient is allergic to pcn [penicillins]; actos [pioglitazone]; clindamycin/lincomycin; and vancomycin.     Past Medical History Nathan Irvin  has a past medical history of Bipolar 2 disorder ([address] BPH (benign prostatic hyperplasia), Diabetes mellitus type 2, uncontrolled ([address] Diabetic peripheral neuropathy ([address] Diabetic polyneuropathy ([address] Diabetic ulcer of right foot associated with type 2 diabetes mellitus ([address] Essential hypertension, GERD (gastroesophageal reflux disease), Hammer toe of left foot, Heart murmur, History of tobacco abuse, Hx of AKA (above knee amputation), right ([address] Macular edema, diabetic, bilateral ([address] Marijuana abuse in remission, Melena, MRSA (methicillin resistant staph aureus) culture positive, Onychomycosis, Other disorders of kidney and ureter in diseases classified elsewhere, Sleep apnea, Thyroid disease, and 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 Past Surgical History  The patient  has a past surgical history that includes other surgical history (Right, 1/14/15); Abscess Drainage (Right); Toe amputation (Right, 1/16/15); Foot Debridement (Right, 07/01/2016); Leg amputation below knee (Right, 07/20/2016); [place] tooth extraction (?when); pr drain infect shoulder bursa (Left, 8/18/2017); pr office/outpt visit,procedure only (Right, 9/20/2018); incision and drainage (Right, 2/18/2019); Leg amputation below knee (Right, 4/4/2019); AMPUTATION ABOVE KNEE (Right, 4/18/2019); Upper gastrointestinal endoscopy (N/A, 3/3/2020); Cholecystectomy, laparoscopic (N/A, 4/1/2020); Endoscopy, colon, diagnostic; Cystoscopy (N/A, 7/20/2020); and Cystoscopy (N/A, 8/21/2020). Family History  This patient's family history includes Arthritis in his maternal grandfather; Depression in his mother; Diabetes in his mother; Early Death in his mother; Heart Disease in his maternal grandfather; High Blood Pressure in his mother; High Cholesterol in his mother; Other in his mother; Vision Loss in his maternal grandmother.     Social History Vitals signs reviewed. Constitutional:       General: He is not in acute distress. Appearance: He is well-developed. He is not diaphoretic. HENT:      Head: Normocephalic and atraumatic. Mouth/Throat:      Mouth: Mucous membranes are moist.      Pharynx: No oropharyngeal exudate. Neck:      Musculoskeletal: Normal range of motion and neck supple. Thyroid: No thyromegaly. Cardiovascular:      Rate and Rhythm: Regular rhythm. Tachycardia present. Heart sounds: Normal heart sounds. No murmur. No friction rub. No gallop. Pulmonary:      Effort: Pulmonary effort is normal. No respiratory distress. Breath sounds: Normal breath sounds. No wheezing or rales. Abdominal:      General: There is no distension. Palpations: Abdomen is soft. Tenderness: There is no abdominal tenderness. Musculoskeletal: Normal range of motion. General: No tenderness. Comments: Right AKA, left lower leg wrapped   Lymphadenopathy:      Cervical: No cervical adenopathy. Skin:     General: Skin is warm and dry. Coloration: Skin is not pale. Findings: No erythema or rash. Neurological:      Mental Status: He is alert and oriented to person, place, and time. Labs Reviewed 12/3/2020:    Lab Results   Component Value Date    WBC 12.6 (H) 12/10/2020    HGB 15.8 12/10/2020    HCT 44.2 12/10/2020     12/10/2020    CHOL 161 10/10/2018    TRIG 328 (H) 10/10/2018    HDL 27 10/08/2020    ALT 38 12/10/2020    AST 56 (H) 12/10/2020     12/10/2020    K 3.8 12/10/2020    CL 96 (L) 12/10/2020    CREATININE 0.8 12/10/2020    BUN 12 12/10/2020    CO2 25 12/10/2020    TSH 2.320 12/10/2020    PSA 0.78 03/24/2020    INR 1.03 08/22/2020    GLUF 341 (H) 10/08/2020    LABA1C 9.7 (H) 10/08/2020    LABMICR 1.21 10/10/2018       Assessment/Plan      1.  Uncontrolled type 2 diabetes mellitus with hyperglycemia, with long-term current use of insulin ([address] 6. Hyperlipidemia LDL goal <70  Currently not on statin. Has had elevated liver enzyme at times, although mild. The 10-year ASCVD risk score (Luisa Harding, et al., 2013) is: 12.1%    Values used to calculate the score:      Age: 44 years      Sex: Male      Is Non- : No      Diabetic: Yes      Tobacco smoker: No      Systolic Blood Pressure: 297 mmHg      Is BP treated: Yes      HDL Cholesterol: 27 mg/dL      Total Cholesterol: 149 mg/dl  Statin indicated, will discuss at next visit. 7. Essential hypertension  Blood pressure 132/62. Stable on lisinopril 5 mg daily. Metoprolol 75 mg twice daily. 8. Tachycardia  Follow-up with cardiology as scheduled    9. Severe episode of recurrent major depressive disorder, without psychotic features ([address]  Reports stable on Zoloft 100 mg daily. Not following with psychiatry as advised during his last hospitalization for depression. Return in about 4 weeks (around 12/31/2020) for Diabetes. Patient given educational materials - see patient instructions. Discussed use, benefit, and side effects of prescribed medications. All patient questions answered. Pt voiced understanding.          Electronically signed MARILYN Simmons CNP on 12/3/20 at 11:31 AM EST

## 2020-12-03 NOTE — PATIENT INSTRUCTIONS
Restart mealtime insulin today - ASAP after getting your insulin refilled and dose prior to your meal. Get me glucose logs on Monday with the short acting insulin on board, and report to me your current insulin doses that morning for adjustment. Blood work fasting in two weeks     Call me with endocrinologist on your insurance and I will arrange referral.       Patient Education        Learning About the Mediterranean Diet  What is the [address]? The Mediterranean diet is a style of eating rather than a diet plan. It features foods eaten in [place], [place], [place] and [place], and other countries along the [facility]. It emphasizes eating foods like fish, fruits, vegetables, beans, high-fiber breads and whole grains, nuts, and olive oil. This style of eating includes limited red meat, cheese, and sweets. Why choose the Mediterranean diet? A Mediterranean-style diet may improve heart health. It contains more fat than other heart-healthy diets. But the fats are mainly from nuts, unsaturated oils (such as fish oils and olive oil), and certain nut or seed oils (such as canola, soybean, or flaxseed oil). These fats may help protect the heart and blood vessels. How can you get started on the Mediterranean diet? Here are some things you can do to switch to a more Mediterranean way of eating. What to eat  · Eat a variety of fruits and vegetables each day, such as grapes, blueberries, tomatoes, broccoli, peppers, figs, olives, spinach, eggplant, beans, lentils, and chickpeas. · Eat a variety of whole-grain foods each day, such as oats, brown rice, and whole wheat bread, pasta, and couscous. · Eat fish at least 2 times a week. Try tuna, salmon, mackerel, lake trout, herring, or sardines. · Eat moderate amounts of low-fat dairy products, such as milk, cheese, or yogurt. · Eat moderate amounts of poultry and eggs.   · Choose healthy (unsaturated) fats, such as nuts, olive oil, and certain nut or seed oils like canola, soybean, and flaxseed. · Limit unhealthy (saturated) fats, such as butter, palm oil, and coconut oil. And limit fats found in animal products, such as meat and dairy products made with whole milk. Try to eat red meat only a few times a month in very small amounts. · Limit sweets and desserts to only a few times a week. This includes sugar-sweetened drinks like soda. The Mediterranean diet may also include red wine with your meal--1 glass each day for women and up to 2 glasses a day for men. Tips for eating at home  · Use herbs, spices, garlic, lemon zest, and citrus juice instead of salt to add flavor to foods. · Add avocado slices to your sandwich instead of ohara. · Have fish for lunch or dinner instead of red meat. Brush the fish with olive oil, and broil or grill it. · Sprinkle your salad with seeds or nuts instead of cheese. · Cook with olive or canola oil instead of butter or oils that are high in saturated fat. · Switch from 2% milk or whole milk to 1% or fat-free milk. · Dip raw vegetables in a vinaigrette dressing or hummus instead of dips made from mayonnaise or sour cream.  · Have a piece of fruit for dessert instead of a piece of cake. Try baked apples, or have some dried fruit. Tips for eating out  · Try broiled, grilled, baked, or poached fish instead of having it fried or breaded. · Ask your  to have your meals prepared with olive oil instead of butter. · Order dishes made with marinara sauce or sauces made from olive oil. Avoid sauces made from cream or mayonnaise. · Choose whole-grain breads, whole wheat pasta and pizza crust, brown rice, beans, and lentils. · Cut back on butter or margarine on bread. Instead, you can dip your bread in a small amount of olive oil. · Ask for a side salad or grilled vegetables instead of french fries or chips. Where can you learn more? Go to [email or web address] and sign in to your MyChart account. Enter [phone] in the [facility] box to learn more about \"Learning About the Mediterranean Diet. \"     If you do not have an account, please click on the \"Sign Up Now\" link. Current as of: August 22, 2019               Content Version: 12.6  © 0562-4065 [employer]. Care instructions adapted under license by [address] If you have questions about a medical condition or this instruction, always ask your healthcare professional. [address] any warranty or liability for your use of this information.

## 2020-12-04 ENCOUNTER — TELEPHONE (OUTPATIENT)
Dept: INTERNAL MEDICINE CLINIC | Age: 50
End: 2020-12-04

## 2020-12-04 ENCOUNTER — HOSPITAL ENCOUNTER (OUTPATIENT)
Dept: NON INVASIVE DIAGNOSTICS | Age: 50
Discharge: HOME | End: 2020-12-04
Payer: MEDICARE

## 2020-12-04 ENCOUNTER — OFFICE VISIT (OUTPATIENT)
Dept: CARDIOLOGY CLINIC | Age: 50
End: 2020-12-04
Payer: MEDICARE

## 2020-12-04 VITALS
WEIGHT: 226.8 LBS | SYSTOLIC BLOOD PRESSURE: 150 MMHG | HEART RATE: 98 BPM | BODY MASS INDEX: 36.45 KG/M2 | HEIGHT: 66 IN | DIASTOLIC BLOOD PRESSURE: 88 MMHG

## 2020-12-04 PROBLEM — R42 DIZZINESS, NONSPECIFIC: Status: ACTIVE | Noted: 2020-12-04

## 2020-12-04 PROCEDURE — G8427 DOCREV CUR MEDS BY ELIG CLIN: HCPCS | Performed by: INTERNAL MEDICINE

## 2020-12-04 PROCEDURE — 1036F TOBACCO NON-USER: CPT | Performed by: INTERNAL MEDICINE

## 2020-12-04 PROCEDURE — G8417 CALC BMI ABV UP PARAM F/U: HCPCS | Performed by: INTERNAL MEDICINE

## 2020-12-04 PROCEDURE — 3017F COLORECTAL CA SCREEN DOC REV: CPT | Performed by: INTERNAL MEDICINE

## 2020-12-04 PROCEDURE — 93270 REMOTE 30 DAY ECG REV/REPORT: CPT

## 2020-12-04 PROCEDURE — 99214 OFFICE O/P EST MOD 30 MIN: CPT | Performed by: INTERNAL MEDICINE

## 2020-12-04 PROCEDURE — G8484 FLU IMMUNIZE NO ADMIN: HCPCS | Performed by: INTERNAL MEDICINE

## 2020-12-04 RX ORDER — METOPROLOL TARTRATE 50 MG/1
75 TABLET, FILM COATED ORAL 2 TIMES DAILY
Qty: 90 TABLET | Refills: 3 | Status: ON HOLD | OUTPATIENT
Start: 2020-12-04 | End: 2021-07-28 | Stop reason: SDUPTHER

## 2020-12-04 ASSESSMENT — ENCOUNTER SYMPTOMS
ABDOMINAL PAIN: 0
TROUBLE SWALLOWING: 0
SHORTNESS OF BREATH: 0
SORE THROAT: 0
DIARRHEA: 0
VOMITING: 0
NAUSEA: 0
CONSTIPATION: 0
COUGH: 0

## 2020-12-04 NOTE — PROCEDURES
The skin was prepped and a 21 day cardiac event monitor was applied. The patient was instructed on the documentation of symptoms and the purpose of the monitor as well as the things to avoid while wearing the monitor. The patient was instructed to remove and return the monitor on 12/25/2020.   The serial number of the monitor that was applied is [id number]

## 2020-12-04 NOTE — TELEPHONE ENCOUNTER
Pt sent in a [employer] message that he can not  insulin. I spoke with Jagruti at [facility] and she states he picked up a 90 day supply on 10/14/20 and the earliest he can fill is 12/28/20. I spoke to [facility] and she will increase the directions on humalog as he is really taking more than the previously dosed. And she will also increase his toujeo directions to 90 units twice a day. He is to call in [facility] on Monday and also we have to know who is in his network for endo as we need to get him referred out as soon as possible. I also notified pt the humalog is u 200 and so may be more effective. I left this message on pts voicemail and will also send him a [employer] message.

## 2020-12-04 NOTE — PROGRESS NOTES
Chief Complaint   Patient presents with    Follow-up     [address]Maya Ramirez F/u  CONSULT DATE:  06/19/2020    REASON OF CONSULTATION:  Worsening of shortness of breath and  palpitation with history of intermittent chest pain in the last several  weeks. Patient presents in office today for a 2 mth fu    Palpitation everyday recently and more frequent  Feel in when he wake up and and in day time    Denied cp    No wt gain    Occasional dizziness    RT AKA- due to diabtes    Sob on exertion   Ex smoker for few months    EKG done 9-2-20    B/L AKA    Occasional palpitation    Small Pericardial effusion  noted on CT chest not confirmed on echo      Patient Active Problem List   Diagnosis    Status post below knee amputation of right lower extremity ([facility] [address]    Gait disturbance    Sebaceous cyst    Uncontrolled type 2 diabetes mellitus with hyperglycemia, with long-term current use of insulin (HCC)    Severe bipolar II disorder, recent episode major depressive, remission (HCC)    Bipolar 1 disorder (HCC)    Leukocytosis    Lactic acidosis    Hyponatremia    Normocytic anemia    Obesity (BMI 30-39. 9)    History of noncompliance with medical treatment    Essential hypertension    Tobacco dependence    Gastroesophageal reflux disease without esophagitis    History of marijuana use    Physical debility    Anemia    Electrolyte imbalance    Type 2 diabetes mellitus with hyperglycemia, with long-term current use of insulin (HCC)    Weakness    Infection of above knee amputation stump of right leg (HCC)    Above knee amputation of right lower extremity (HCC)    Sepsis (HCC)    Vitamin D deficiency    COPD exacerbation (HCC)    Influenza B    Depression, recurrent (HCC)    Major depressive disorder, recurrent (HCC)    GI bleed    Elevated lipase    Other psychoactive substance abuse, uncomplicated (HCC)    Calculus of gallbladder without cholecystitis without obstruction    Right upper quadrant abdominal pain    Pedal edema    MURALI (obstructive sleep apnea)    Shortness of breath    Hypothyroid    Anxiety and depression    Dyspnea    Sinus tachycardia    Metabolic acidosis    Traumatic open wound of left lower leg with delayed healing    Edema of left lower extremity    SOB (shortness of breath) on exertion    Intermittent palpitations    History of chest pain    Dizziness, nonspecific       Past Surgical History:   Procedure Laterality Date    ABSCESS DRAINAGE Right     foot    AMPUTATION ABOVE KNEE Right 4/18/2019    RIGHT ABOVE KNEE AMPUTATION performed by Gracy Paniagua MD at [address] [place] [address], LAPAROSCOPIC N/A 4/1/2020    ROBOTIC CHOLECYSTECTOMY performed by Maura Dodd MD at [address] [facility] 7/20/2020    CYSTOSCOPY performed by Radha Gonzalez MD at [address] 8/21/2020    CYSTOSCOPY, UROLIFT performed by Radha Gonzalez MD at [facility], DIAGNOSTIC      FOOT DEBRIDEMENT Right 07/01/2016    I & D    INCISION AND DRAINAGE Right 2/18/2019    I&D RIGHT STUMP performed by Gracy Paniagua MD at [address] [place] [address],[address] Right 07/20/2016    LEG AMPUTATION BELOW KNEE Right 4/4/2019    I&D AND REVISION OF AMPUTATION RIGHT LEG performed by Gracy Paniagua MD at [place] [address] Right 1/14/15    sole of foot I&D    AL DRAIN INFECT SHOULDER BURSA Left 8/18/2017    LEFT SHOULDER INCISION AND DRAINAGE performed by Gracy Paniagua MD at [address] [facility] OFFICE/OUTPT [facility] Right 9/20/2018    EXCISIONAL DEBRIDEMENT RIGHT BKA STUMP performed by Yahaira Hernandez MD at [address] Right 1/16/15    2nd toe with wound vac applied    UPPER GASTROINTESTINAL ENDOSCOPY N/A 3/3/2020    EGD DILATION SAVORY performed by Venu Eubanks MD at [address] [facility]  ? when       Allergies   Allergen Reactions    Pcn [Penicillins] Shortness Of Breath, Nausea And Vomiting and Other (See Comments)     Does not remember reactions. Has TOLERATED amoxicillin and several different cephalosporins.  Actos [Pioglitazone] Swelling    Clindamycin/Lincomycin Itching    Vancomycin Hives      Rash, with erythematous plaques to abdomen, shoulders, and proximal upper extremities with pruritis, persisted with 2nd dose administered slower.           Family History   Problem Relation Age of Onset   [facility] Diabetes Mother     Other Mother         pneumonia, H1N1    Depression Mother     Early Death Mother     High Blood Pressure Mother     High Cholesterol Mother     Vision Loss Maternal Grandmother     Arthritis Maternal Grandfather     Heart Disease Maternal Grandfather         Social History     Socioeconomic History    Marital status:      Spouse name: Not on file    Number of children: 2    Years of education: 15    Highest education level: Not on file   Occupational History    Not on file   Social Needs    Financial resource strain: Not on file    Food insecurity     Worry: Not on file     Inability: Not on file   [address] needs     Medical: Not on file     Non-medical: Not on file   Tobacco Use    Smoking status: Former Smoker     Packs/day: 1.00     Years: 10.00     Pack years: 10.00     Types: Cigars     Start date: 1985     Last attempt to quit: 2000     Years since quittin.2    Smokeless tobacco: Never Used   Substance and Sexual Activity    Alcohol use: Not Currently     Alcohol/week: 0.0 standard drinks    Drug use: No     Comment: 30 YEARS AGO    Sexual activity: Yes     Partners: Female   Lifestyle    Physical activity     Days per week: Not on file     Minutes per session: Not on file    Stress: Not on file   Relationships    Social connections     Talks on phone: Not on file     Gets together: Not on file     Attends Hindu service: Not on file     Active member of club or organization: Not on file     Attends meetings of clubs or organizations: Not on file     Relationship status: Not on file    Intimate partner violence     Fear of current or ex partner: Not on file     Emotionally abused: Not on file     Physically abused: Not on file     Forced sexual activity: Not on file   Other Topics Concern    Not on file   Social History Narrative    Not on file       Current Outpatient Medications   Medication Sig Dispense Refill    insulin lispro (HUMALOG KWIKPEN U-200) 200 UNIT/ML SOPN pen Inject 35 Units into the skin 3 times daily (with meals) Plus scale - Max dose 150 units per day 15 pen 3    Insulin Glargine, 2 Unit Dial, 300 UNIT/ML SOPN Inject 90 Units into the skin 2 times daily 15 pen 5    metoprolol tartrate (LOPRESSOR) 50 MG tablet Take 1.5 tablets by mouth 2 times daily 90 tablet 3    levothyroxine (SYNTHROID) 50 MCG tablet Take 1 tablet by mouth daily 30 tablet 5    empagliflozin (JARDIANCE) 10 MG tablet Take 1 tablet by mouth daily 30 tablet 5    sertraline (ZOLOFT) 100 MG tablet Take 1 tablet by mouth daily 30 tablet 2    gabapentin (NEURONTIN) 400 MG capsule Take 1 capsule by mouth 3 times daily for 60 days. 90 capsule 1    ondansetron (ZOFRAN ODT) 4 MG disintegrating tablet Take 1 tablet by mouth every 8 hours as needed for Nausea or Vomiting 20 tablet 0    pantoprazole (PROTONIX) 40 MG tablet Take 40 mg by mouth daily      blood glucose monitor kit and supplies Accu Chek Sheila meter.  Use to test blood sugars DX:E11.65 1 kit 0    blood glucose monitor strips Accucheck Sheila Test Strips Test blood sugars 4 times daily DX:E11.65 400 strip 5    Lancets MISC Use to test blood sugars 4 times daily DX:E11.65 400 each 5    oxybutynin (DITROPAN) 5 MG tablet Take 1 tablet by mouth 3 times daily 20 tablet 0    lisinopril (PRINIVIL;ZESTRIL) 5 MG tablet Take 1 tablet by mouth daily 30 tablet 0    Insulin Syringe-Needle U-100 29G X 1/2\" 0.3 ML MISC 1 each by Does not apply route 4 times daily (after meals and at bedtime) 200 each 0     No current facility-administered medications for this visit. Review of Systems -     General ROS: negative  Psychological ROS: negative  Hematological and Lymphatic ROS: No history of blood clots or bleeding disorder. Respiratory ROS: no cough,  or wheezing, the rest see HPI  Cardiovascular ROS: See HPI  Gastrointestinal ROS: negative  Genito-Urinary ROS: no dysuria, trouble voiding, or hematuria  Musculoskeletal ROS: negative  Neurological ROS: no TIA or stroke symptoms  Dermatological ROS: negative      Blood pressure (!) 150/88, pulse 98, height 5' 6\" (1.676 m), weight 226 lb 12.8 oz (102.9 kg). Physical Examination:    General appearance - alert, well appearing, and in no distress  HEENT- Pink conjunctiva  , Non-icteri sclera,PERRLA  Mental status - alert, oriented to person, place, and time  Neck - supple, no significant adenopathy, no JVD, or carotid bruits  Chest - clear to auscultation, no wheezes, rales or rhonchi, symmetric air entry  Heart - normal rate, regular rhythm, normal S1, S2, no murmurs, rubs, clicks or gallops  Abdomen - soft, nontender, nondistended, no masses or organomegaly  ISMAEL- no CVA or flank tenderness, no suprapubic tenderness  Neurological - alert, oriented, normal speech, no focal findings or movement disorder noted  Musculoskeletal/limbs - no joint tenderness, deformity or swelling   - peripheral pulses normal, no pedal edema, no clubbing or cyanosis  Skin - normal coloration and turgor, no rashes, no suspicious skin lesions noted  Psych- appropriate mood and affect    Lab  No results for input(s): CKTOTAL, CKMB, CKMBINDEX, TROPONINI in the last 72 hours.   CBC:   Lab Results   Component Value Date    WBC 10.7 11/30/2020    RBC 4.71 11/30/2020    HGB 15.1 11/30/2020    HCT 42.6 11/30/2020    MCV 90.4 11/30/2020    MCH 32.1 11/30/2020    MCHC 35.4 11/30/2020    RDW 14.8 03/17/2020     11/30/2020    MPV 9.5 11/30/2020     BMP: Lab Results   Component Value Date     11/30/2020    K 4.3 11/30/2020    K 4.6 09/10/2020    CL 97 11/30/2020    CO2 22 11/30/2020    BUN 15 11/30/2020    LABALBU 3.8 10/08/2020    CREATININE 0.7 11/30/2020    CALCIUM 9.5 11/30/2020    GFRAA >60 08/24/2016    LABGLOM >90 11/30/2020    GLUCOSE 338 11/30/2020    GLUCOSE 318 03/17/2020     Hepatic Function Panel:    Lab Results   Component Value Date    ALKPHOS 70 10/08/2020    ALT 36 10/08/2020    AST 48 10/08/2020    PROT 7.7 10/08/2020    BILITOT 0.4 10/08/2020    BILIDIR <0.2 08/22/2020    IBILI 0.1 03/02/2015    LABALBU 3.8 10/08/2020     Magnesium:    Lab Results   Component Value Date    MG 2.0 10/08/2020     Warfarin PT/INR:  No components found for: PTPATWAR, PTINRWAR  HgBA1c:    Lab Results   Component Value Date    LABA1C 9.7 10/08/2020     FLP:    Lab Results   Component Value Date    TRIG 328 10/10/2018    HDL 27 10/08/2020    LDLCALC 72 10/08/2020     TSH:    Lab Results   Component Value Date    TSH 2.690 10/08/2020           1. There is a small pericardial effusion. 2. There is suboptimal opacification of the pulmonary arterial system. However, there are no large pulmonary emboli noted in the main pulmonary outflow tract or right or left main pulmonary arteries.                        This report has been created using voice recognition software. It may contain minor errors which are inherent in voice recognition technology.        Final report electronically signed by Dr Koki Xavier on 9/3/2020 12:07 AM       Conclusions      Summary   Lexiscan EKG stress test is not suggestive for ischemia. The nuclear images is not suggestive for myocardial ischemia.       Signatures      ----------------------------------------------------------------   Electronically signed by Stanford Issa MD (Interpreting   Cardiologist) on 06/19/2020 at 18:21   ----------------------------------------------------------------         Conclusions      Summary Normal left ventricle size and systolic function. Ejection fraction was   estimated at 60 %. There were no regional left ventricular wall motion   abnormalities and wall thickness was within normal limits. Doppler parameters were consistent with abnormal left ventricular   relaxation (grade 1 diastolic dysfunction). The pericardium was normal in appearance with no evidence of a pericardial   effusion. Signature    ----------------------------------------------------------------   Electronically signed by Nan Pozo MD (Interpreting   physician) on 09/15/2020 at 05:55 PM      Assessment    Small Pericardial effusion  noted on CT chest not confirmed on echo     Diagnosis Orders   1. Intermittent palpitations     2. Shortness of breath     3. Essential hypertension     4. Dizziness, nonspecific     5. Sinus tachycardia           Recent admission DX    ASSESSMENT:  1. Shortness of breath, basically unexplained the course of which is  not very clear, so probably the patient may benefit from pulmonary  function study on that line and once cardiac cause of short of breath  has been ruled out. He has history of smoking, and he has truncal  obesity. 2.  Hypertension. 3.  Diabetes with complication, neuropathy and diabetic foot abscess,  above-the-knee amputation on the right, wheelchair-bound. 4.  Hypothyroidism. 5.  Anxiety, depression, bipolar disorder. 6.  Above-the-knee amputation on the right related to diabetes. 7.  Wheelchair-bound. 8.  Palpitation, intermittent in the last several months. 9.  History of intermittent chest pain once in a while. Plan     The current meds and labs reviewdd    Patient Seen, Chart, Consults notes, Labs, Radiology studies reviewed.       Sinus tachy- better now  On BB  TSH okay    Small Pericardial effusion  noted on CT chest 06/2020 not confirmed on echo  09/2020 and 05/2020  Hx of URTI in June 2020    Palpitation  Could not complete zio patch was not able to be attached to his body  Need to have event monitor for me to address the issue  Could not wear Zio patch  In the last visit I advised to get event monitor and then we will act accordingly  But did not get event monitor yet  Re-advised to get event monitor    Recently  Dxed with MURALI and to have CPAP    Sob on exertion  PFT July 2020  Mild diffusion reduction- no other abn    Hypertension, on medical treatment. Seems to be under poor control. Patient is compliant with medical treatment.    Need better control  Increase lopressor 75 po bid from 50 bid    D/w the pat the plan of care    RTC in 2 months        Kalyani January [facility]

## 2020-12-06 LAB
BLOOD CULTURE, ROUTINE: NORMAL
BLOOD CULTURE, ROUTINE: NORMAL

## 2020-12-06 NOTE — PROGRESS NOTES
Pharmacy Note  ED Culture Follow-up    Gorge Anderson is a 44 y.o. male. Allergies: Pcn [penicillins]; Actos [pioglitazone]; Clindamycin/lincomycin; and Vancomycin     Labs:  Lab Results   Component Value Date    BUN 15 11/30/2020    CREATININE 0.7 11/30/2020    WBC 10.7 11/30/2020     CrCl cannot be calculated (Unknown ideal weight.). Current antimicrobials:   none    ASSESSMENT:  Micro results:   Wound culture: positive for staphyloccus     PLAN:  Need for intervention: Yes, but will defer to specialist  Discussed with: TITO Shepard  Chosen treatment:    Patient will be treated by specialist    Patient response:   Fax to Dr. Yosef Jennings    Called/sent in prescription to: Not applicable    Please call with any questions.  Kiah Beauchamp, PharmD 5:07 PM 12/6/2020

## 2020-12-10 ENCOUNTER — HOSPITAL ENCOUNTER (EMERGENCY)
Age: 50
Discharge: HOME | End: 2020-12-10
Payer: MEDICARE

## 2020-12-10 VITALS
HEART RATE: 113 BPM | WEIGHT: 226 LBS | SYSTOLIC BLOOD PRESSURE: 132 MMHG | RESPIRATION RATE: 16 BRPM | OXYGEN SATURATION: 95 % | HEIGHT: 66 IN | TEMPERATURE: 98.5 F | BODY MASS INDEX: 36.32 KG/M2 | DIASTOLIC BLOOD PRESSURE: 62 MMHG

## 2020-12-10 LAB
ACETAMINOPHEN LEVEL: < 5 UG/ML (ref 0–20)
ALBUMIN SERPL-MCNC: 3.7 G/DL (ref 3.5–5.1)
ALP BLD-CCNC: 68 U/L (ref 38–126)
ALT SERPL-CCNC: 38 U/L (ref 11–66)
AMPHETAMINE+METHAMPHETAMINE URINE SCREEN: NEGATIVE
ANION GAP SERPL CALCULATED.3IONS-SCNC: 15 MEQ/L (ref 8–16)
AST SERPL-CCNC: 56 U/L (ref 5–40)
BACTERIA: ABNORMAL
BARBITURATE QUANTITATIVE URINE: NEGATIVE
BASOPHILS # BLD: 0.6 %
BASOPHILS ABSOLUTE: 0.1 THOU/MM3 (ref 0–0.1)
BENZODIAZEPINE QUANTITATIVE URINE: NEGATIVE
BILIRUB SERPL-MCNC: 0.4 MG/DL (ref 0.3–1.2)
BILIRUBIN URINE: NEGATIVE
BLOOD, URINE: NEGATIVE
BUN BLDV-MCNC: 12 MG/DL (ref 7–22)
CALCIUM SERPL-MCNC: 9.6 MG/DL (ref 8.5–10.5)
CANNABINOID QUANTITATIVE URINE: NEGATIVE
CASTS: ABNORMAL /LPF
CASTS: ABNORMAL /LPF
CHARACTER, URINE: CLEAR
CHLORIDE BLD-SCNC: 96 MEQ/L (ref 98–111)
CO2: 25 MEQ/L (ref 23–33)
COCAINE METABOLITE QUANTITATIVE URINE: NEGATIVE
COLOR: YELLOW
CREAT SERPL-MCNC: 0.8 MG/DL (ref 0.4–1.2)
CRYSTALS: ABNORMAL
EOSINOPHIL # BLD: 2.1 %
EOSINOPHILS ABSOLUTE: 0.3 THOU/MM3 (ref 0–0.4)
EPITHELIAL CELLS, UA: ABNORMAL /HPF
ERYTHROCYTE [DISTWIDTH] IN BLOOD BY AUTOMATED COUNT: 14 % (ref 11.5–14.5)
ERYTHROCYTE [DISTWIDTH] IN BLOOD BY AUTOMATED COUNT: 45.1 FL (ref 35–45)
ETHYL ALCOHOL, SERUM: < 0.01 %
GFR SERPL CREATININE-BSD FRML MDRD: > 90 ML/MIN/1.73M2
GLUCOSE BLD-MCNC: 200 MG/DL (ref 70–108)
GLUCOSE, URINE: >= 1000 MG/DL
HCT VFR BLD CALC: 44.2 % (ref 42–52)
HEMOGLOBIN: 15.8 GM/DL (ref 14–18)
IMMATURE GRANS (ABS): 0.09 THOU/MM3 (ref 0–0.07)
IMMATURE GRANULOCYTES: 0.7 %
KETONES, URINE: NEGATIVE
LEUKOCYTE ESTERASE, URINE: NEGATIVE
LYMPHOCYTES # BLD: 20 %
LYMPHOCYTES ABSOLUTE: 2.5 THOU/MM3 (ref 1–4.8)
MCH RBC QN AUTO: 32 PG (ref 26–33)
MCHC RBC AUTO-ENTMCNC: 35.7 GM/DL (ref 32.2–35.5)
MCV RBC AUTO: 89.5 FL (ref 80–94)
MISCELLANEOUS LAB TEST RESULT: ABNORMAL
MONOCYTES # BLD: 7.2 %
MONOCYTES ABSOLUTE: 0.9 THOU/MM3 (ref 0.4–1.3)
NITRITE, URINE: NEGATIVE
NUCLEATED RED BLOOD CELLS: 0 /100 WBC
OPIATES, URINE: NEGATIVE
OSMOLALITY CALCULATION: 277.4 MOSMOL/KG (ref 275–300)
OXYCODONE: NEGATIVE
PH UA: 6.5 (ref 5–9)
PHENCYCLIDINE QUANTITATIVE URINE: NEGATIVE
PLATELET # BLD: 238 THOU/MM3 (ref 130–400)
PMV BLD AUTO: 9.4 FL (ref 9.4–12.4)
POTASSIUM SERPL-SCNC: 3.8 MEQ/L (ref 3.5–5.2)
PROTEIN UA: ABNORMAL MG/DL
RBC # BLD: 4.94 MILL/MM3 (ref 4.7–6.1)
RBC URINE: ABNORMAL /HPF
RENAL EPITHELIAL, UA: ABNORMAL
SALICYLATE, SERUM: < 0.3 MG/DL (ref 2–10)
SEG NEUTROPHILS: 69.4 %
SEGMENTED NEUTROPHILS ABSOLUTE COUNT: 8.7 THOU/MM3 (ref 1.8–7.7)
SODIUM BLD-SCNC: 136 MEQ/L (ref 135–145)
SPECIFIC GRAVITY UA: > 1.03 (ref 1–1.03)
TOTAL PROTEIN: 7.2 G/DL (ref 6.1–8)
TSH SERPL DL<=0.05 MIU/L-ACNC: 2.32 UIU/ML (ref 0.4–4.2)
UROBILINOGEN, URINE: 1 EU/DL (ref 0–1)
WBC # BLD: 12.6 THOU/MM3 (ref 4.8–10.8)
WBC UA: ABNORMAL /HPF
YEAST: ABNORMAL

## 2020-12-10 PROCEDURE — 80307 DRUG TEST PRSMV CHEM ANLYZR: CPT

## 2020-12-10 PROCEDURE — G0480 DRUG TEST DEF 1-7 CLASSES: HCPCS

## 2020-12-10 PROCEDURE — 80053 COMPREHEN METABOLIC PANEL: CPT

## 2020-12-10 PROCEDURE — 85025 COMPLETE CBC W/AUTO DIFF WBC: CPT

## 2020-12-10 PROCEDURE — 84443 ASSAY THYROID STIM HORMONE: CPT

## 2020-12-10 PROCEDURE — 99284 EMERGENCY DEPT VISIT MOD MDM: CPT

## 2020-12-10 PROCEDURE — 81001 URINALYSIS AUTO W/SCOPE: CPT

## 2020-12-10 PROCEDURE — 36415 COLL VENOUS BLD VENIPUNCTURE: CPT

## 2020-12-10 ASSESSMENT — PATIENT HEALTH QUESTIONNAIRE - PHQ9: SUM OF ALL RESPONSES TO PHQ QUESTIONS 1-9: 13

## 2020-12-10 ASSESSMENT — ENCOUNTER SYMPTOMS
BACK PAIN: 0
COLOR CHANGE: 0
ABDOMINAL PAIN: 0
DIARRHEA: 0
VOMITING: 0
NAUSEA: 0
ABDOMINAL DISTENTION: 0
SHORTNESS OF BREATH: 0

## 2020-12-10 ASSESSMENT — SLEEP AND FATIGUE QUESTIONNAIRES
DIFFICULTY FALLING ASLEEP: YES
RESTFUL SLEEP: NO
DIFFICULTY STAYING ASLEEP: YES
SLEEP PATTERN: DIFFICULTY FALLING ASLEEP
AVERAGE NUMBER OF SLEEP HOURS: 3
DIFFICULTY ARISING: YES
DO YOU HAVE DIFFICULTY SLEEPING: YES
DO YOU USE A SLEEP AID: NO

## 2020-12-10 NOTE — ED NOTES
Patient presents to the ED with complaints of anxiety that started today after leaving [facility]. States they were talking about death which made him think of his grandpa. Denies feeling suicidal or homicidal. During triage patient states \"I just cant handle it anymore\". Denies any chest pain or shortness of breath.      Stacia Vasquez  12/10/20 1844

## 2020-12-11 NOTE — PROGRESS NOTES
Provisional Diagnosis:    Unspecified Depressive Disorder     Risk, Psychosocial and Contextual Factors:  Bereavement     Current  Treatment:  Rakesh      Present Suicidal Behavior:      Verbal:   Denies        Attempt:  Denies    Access to Weapons:  Denies    Current Suicide Risk: Low, Moderate or High:    Low    Past Suicidal Behavior:       Verbal:   Yes    Attempt:  Yes    Self-Injurious/Self-Mutilation: Denies     Traumatic Event Within Past 2 Weeks:   Denies     Current Abuse:  Denies     Legal:    Denies     Violence:   Denies     Protective Factors:   Good relationships, Hobbies     Housing:      Lives in restoration house     CPAP/Oxygen/Ambulation Difficulties: Patient left leg amputated    Basic Vital Signs Normal?: Check with Patients Nurse prior to Calling Psychiatry    Critical Labs?: Check with Patients Nurse prior to Calling Psychiatry    Clinical Summary:      Patient is a 44year old male who presents to the ED voluntarily. Patient reports to this clinician he was talking with roommate tonight and decided he needed help. Patient reports of daily depression. Patient reports wishes to be dead, 'to be with mom and grandson'. Patient reports mom passed away in 2014. Patient reports leg was amputated in 2016. Patient states 'It's been getting worse since then'. Patient reports past suicidal ideation and attempt. Patient denies suicidal thoughts at this time. Patient reports change in medication last month. Patient reports he takes 1 or 2 more pills than prescribed. Patient reports being prescribed gabapentin for pain at this time. Patient does follow up outpatient for mental health treatment at [facility]. Homicidal thoughts and/or plans denied. No delusions noted. Hallucinations denied. AOD denied. Patient alert and oriented x4. Patient cooperative at this time. Level of Care Disposition:      Consulted with medical provider Counts.  Patient is medically cleared. Consulted with Dr. Sharmaine Cristina. Patient to be discharged and follow up outpatient. Informed patients medical provider. Patient receptive to plan.

## 2020-12-11 NOTE — ED NOTES
Urine sample. Pt respirations easy and unlabored. Pt given ice water. PT denies any further needs at this time.       Randolph Pardo RN  12/10/20 2035

## 2020-12-11 NOTE — ED NOTES
Pt provided urinal for urine sample. Pt verbalized understanding we need urine sample ASAP. Pt respirations easy and unlabored. Pt denies any further needs at this time. Will monitor.       Marylene Setting, RN  12/10/20 2011

## 2020-12-11 NOTE — ED PROVIDER NOTES
[facility] Emergency Department    CHIEF COMPLAINT       Chief Complaint   Patient presents with    Anxiety       Nurses Notes reviewed and I agree except as noted in the HPI. HISTORY OF PRESENT ILLNESS    Tyesha Innocent derrick 44 y.o. male who presents to the ED for evaluation of suicidal ideation. Patient states he has a history of bipolar disorder, today he was at [facility], they were talking about death, on now he feels like he just wants to die. He wants to be with his loved ones who are already dead. He denies having a plan. He notes he is currently on gabapentin, he is not taking it correctly. He notes he is taking 2000 mg once a day in the morning. He notes it feels makes him feel euphoric. He states he is been doing this for the last couple of days, he notes a history of diabetes, he has a history of an amputation above the knee on the right side. He denies any other medical history. He denies any other overdosage of medicines, he notes he has been taking his insulin as prescribed. He notes his blood sugars been running in the 300s. HPI was provided by the patient. REVIEW OF SYSTEMS     Review of Systems   Constitutional: Negative for activity change, chills and fever. Respiratory: Negative for shortness of breath. Cardiovascular: Negative for chest pain. Gastrointestinal: Negative for abdominal distention, abdominal pain, diarrhea, nausea and vomiting. Genitourinary: Negative for decreased urine volume, difficulty urinating, dysuria, flank pain and frequency. Musculoskeletal: Negative for arthralgias and back pain. Skin: Negative for color change, rash and wound. Allergic/Immunologic: Positive for immunocompromised state. Neurological: Negative for dizziness, weakness, light-headedness, numbness and headaches. Hematological: Does not bruise/bleed easily. Psychiatric/Behavioral: Positive for suicidal ideas.  Negative for agitation, behavioral problems, confusion, hallucinations and self-injury. The patient is not nervous/anxious. PAST MEDICAL HISTORY     Past Medical History:   Diagnosis Date    Bipolar 2 disorder [facility])     previously followed with Dr. Tiburcio Nagy and Amanda Dumont in [facility] BPH (benign prostatic hyperplasia)     Diabetes mellitus type 2, uncontrolled ([address]     HgbA1c on 4/2/2019 was 9.1.  Diabetic peripheral neuropathy (HCC)     Diabetic polyneuropathy ([address]     Diabetic ulcer of right foot associated with type 2 diabetes mellitus ([address] 12/10/2015    Essential hypertension     \"never been on b/p medication that I know of\"    GERD (gastroesophageal reflux disease)     Hammer toe of left foot     Heart murmur     denies any chest pain or palpitations    History of tobacco abuse     Hx of AKA (above knee amputation), right ([address] 04/18/2019    Dr. Mau Webb edema, diabetic, bilateral ([address] 05/04/2018    Dr. Gregory Barney referred to retina specialist for 2nd opinion    Marijuana abuse in remission     Melena     MRSA (methicillin resistant staph aureus) culture positive     Onychomycosis     Other disorders of kidney and ureter in diseases classified elsewhere     Sleep apnea     no cpap    Thyroid disease     WD-Skin ulcer of fourth toe of right foot with necrosis of bone ([address] 6/29/2016       SURGICALHISTORY      has a past surgical history that includes other surgical history (Right, 1/14/15); Abscess Drainage (Right); Toe amputation (Right, 1/16/15); Foot Debridement (Right, 07/01/2016); Leg amputation below knee (Right, 07/20/2016); [place] tooth extraction (?when); pr drain infect shoulder bursa (Left, 8/18/2017); pr office/outpt visit,procedure only (Right, 9/20/2018); incision and drainage (Right, 2/18/2019); Leg amputation below knee (Right, 4/4/2019); AMPUTATION ABOVE KNEE (Right, 4/18/2019); Upper gastrointestinal endoscopy (N/A, 3/3/2020); Cholecystectomy, laparoscopic (N/A, 4/1/2020);  Endoscopy, colon, diagnostic; Cystoscopy (N/A, 7/20/2020); and Cystoscopy (N/A, 8/21/2020). CURRENT MEDICATIONS       Discharge Medication List as of 12/10/2020  9:34 PM      CONTINUE these medications which have NOT CHANGED    Details   insulin lispro (HUMALOG KWIKPEN U-200) 200 UNIT/ML SOPN pen Inject 35 Units into the skin 3 times daily (with meals) Plus scale - Max dose 150 units per day, Disp-15 pen,R-3Normal      Insulin Glargine, 2 Unit Dial, 300 UNIT/ML SOPN Inject 90 Units into the skin 2 times daily, Disp-15 pen,R-5Normal      metoprolol tartrate (LOPRESSOR) 50 MG tablet Take 1.5 tablets by mouth 2 times daily, Disp-90 tablet,R-3Normal      levothyroxine (SYNTHROID) 50 MCG tablet Take 1 tablet by mouth daily, Disp-30 tablet,R-5Normal      empagliflozin (JARDIANCE) 10 MG tablet Take 1 tablet by mouth daily, Disp-30 tablet,R-5Normal      sertraline (ZOLOFT) 100 MG tablet Take 1 tablet by mouth daily, Disp-30 tablet,R-2Normal      gabapentin (NEURONTIN) 400 MG capsule Take 1 capsule by mouth 3 times daily for 60 days. , Disp-90 capsule,R-1Normal      ondansetron (ZOFRAN ODT) 4 MG disintegrating tablet Take 1 tablet by mouth every 8 hours as needed for Nausea or Vomiting, Disp-20 tablet,R-0Normal      pantoprazole (PROTONIX) 40 MG tablet Take 40 mg by mouth dailyHistorical Med      blood glucose monitor kit and supplies Accu Chek Sheila meter.  Use to test blood sugars DX:E11.65, Disp-1 kit,R-0, Normal      blood glucose monitor strips Accucheck Sheila Test Strips Test blood sugars 4 times daily DX:E11.65, Disp-400 strip,R-5, Normal      Lancets MISC Disp-400 each,R-5, NormalUse to test blood sugars 4 times daily DX:E11.65      oxybutynin (DITROPAN) 5 MG tablet Take 1 tablet by mouth 3 times daily, Disp-20 tablet,R-0Print      lisinopril (PRINIVIL;ZESTRIL) 5 MG tablet Take 1 tablet by mouth daily, Disp-30 tablet, R-0Normal      Insulin Syringe-Needle U-100 29G X 1/2\" 0.3 ML MISC 4 TIMES DAILY AFTER MEALS AND BEFORE BEDTIME Starting 2019, Disp-200 each, R-0, Normal             ALLERGIES     is allergic to pcn [penicillins]; actos [pioglitazone]; clindamycin/lincomycin; and vancomycin. FAMILY HISTORY     He indicated that his mother is . He reported the following about his father: unknown. He indicated that the status of his maternal grandmother is unknown. He indicated that the status of his maternal grandfather is unknown.   family history includes Arthritis in his maternal grandfather; Depression in his mother; Diabetes in his mother; Early Death in his mother; Heart Disease in his maternal grandfather; High Blood Pressure in his mother; High Cholesterol in his mother; Other in his mother; Vision Loss in his maternal grandmother.     SOCIAL HISTORY       Social History     Socioeconomic History    Marital status:      Spouse name: Not on file    Number of children: 2    Years of education: 15    Highest education level: Not on file   Occupational History    Not on file   Social Needs    Financial resource strain: Not on file    Food insecurity     Worry: Not on file     Inability: Not on file   [employer] needs     Medical: Not on file     Non-medical: Not on file   Tobacco Use    Smoking status: Former Smoker     Packs/day: 1.00     Years: 10.00     Pack years: 10.00     Types: Cigars     Start date: 1985     Last attempt to quit: 2000     Years since quittin.3    Smokeless tobacco: Never Used   Substance and Sexual Activity    Alcohol use: Not Currently     Alcohol/week: 0.0 standard drinks    Drug use: No     Comment: 30 YEARS AGO    Sexual activity: Yes     Partners: Female   Lifestyle    Physical activity     Days per week: Not on file     Minutes per session: Not on file    Stress: Not on file   Relationships    Social connections     Talks on phone: Not on file     Gets together: Not on file     Attends [employer] service: Not on file     Active member of club or evaluated the patient, do not feel he meets inpatient criteria. This was discussed with the patient he is amenable with discharge. He is advised to follow-up with Destini Nuno. He verbalized understanding plan of care. Medications - No data to display    Patient was seenindependently by myself. The patient's final impression and disposition and plan was determined by myself. CRITICAL CARE:   None    CONSULTS:  None    PROCEDURES:  None    FINAL IMPRESSION     1. Suicidal thoughts    2. History of bipolar disorder    3. Misuse of prescription only drugs          DISPOSITION/PLAN   Patient discharged in stable condition    PATIENT REFERREDTO:  Cameron  [address] [facility] [address]  [phone]    Go to   For follow up and evaluation      DISCHARGE MEDICATIONS:  Discharge Medication List as of 12/10/2020  9:34 PM          (Please note that portions of this note were completed with a voice recognition program.  Efforts were made to edit the dictations but occasionally words are mis-transcribed.)      Provider:  I personally performed the services described in the documentation,reviewed and edited the documentation which was dictated to the scribe in my presence, and it accurately records my words and actions.     Alec Grimes CNP 12/10/20 10:30 PM    [facility], APRN - CNP        [employer]rock[employer]visor, APRN - CNP  12/10/20 [id number]

## 2020-12-11 NOTE — ED NOTES
Pt states \"My mind is just going everywhere right now\". Pt denies suicidal thoughts.       Riri Cantu RN  12/10/20 1914

## 2020-12-14 ENCOUNTER — OFFICE VISIT (OUTPATIENT)
Dept: UROLOGY | Age: 50
End: 2020-12-14
Payer: MEDICARE

## 2020-12-14 VITALS — HEIGHT: 66 IN | BODY MASS INDEX: 36.32 KG/M2 | WEIGHT: 226 LBS | TEMPERATURE: 97.5 F

## 2020-12-14 LAB
BILIRUBIN URINE: NEGATIVE
BLOOD URINE, POC: ABNORMAL
CHARACTER, URINE: CLEAR
COLOR, URINE: YELLOW
GLUCOSE URINE: 500 MG/DL
KETONES, URINE: NEGATIVE
LEUKOCYTE CLUMPS, URINE: NEGATIVE
NITRITE, URINE: NEGATIVE
PH, URINE: 6.5 (ref 5–9)
POST VOID RESIDUAL (PVR): 186 ML
PROTEIN, URINE: NEGATIVE MG/DL
SPECIFIC GRAVITY, URINE: 1.02 (ref 1–1.03)
UROBILINOGEN, URINE: 0.2 EU/DL (ref 0–1)

## 2020-12-14 PROCEDURE — 51798 US URINE CAPACITY MEASURE: CPT | Performed by: UROLOGY

## 2020-12-14 PROCEDURE — 99213 OFFICE O/P EST LOW 20 MIN: CPT | Performed by: UROLOGY

## 2020-12-14 PROCEDURE — 81003 URINALYSIS AUTO W/O SCOPE: CPT | Performed by: UROLOGY

## 2020-12-14 PROCEDURE — 1036F TOBACCO NON-USER: CPT | Performed by: UROLOGY

## 2020-12-14 PROCEDURE — G8484 FLU IMMUNIZE NO ADMIN: HCPCS | Performed by: UROLOGY

## 2020-12-14 PROCEDURE — G8427 DOCREV CUR MEDS BY ELIG CLIN: HCPCS | Performed by: UROLOGY

## 2020-12-14 PROCEDURE — 3017F COLORECTAL CA SCREEN DOC REV: CPT | Performed by: UROLOGY

## 2020-12-14 PROCEDURE — G8417 CALC BMI ABV UP PARAM F/U: HCPCS | Performed by: UROLOGY

## 2020-12-14 NOTE — PROGRESS NOTES
Dr. Jj Santos MD  [facility]rology Clinic Consultation / New Patient Visit    Patient:  Isabell Ferreira  YOB: 1970  Date: 12/14/2020  Consult requested from MARILYN Taveras CNP     HISTORY OF PRESENT ILLNESS:   The patient is a 44 y.o. male who presents today for follow-up for the following problem(s): BPH, bladder wall thickening  Overall the problem(s) : are worsening. Associated Symptoms: No dysuria, gross hematuria. Pain Severity:      Today visit:    12/14/20   Srinivasa Amin only presents after Urolift to help with his atonic bladder secondary to diabetic cystopath. He is voiding much better. PVR: 186 ml.      9/14/20  Today we see Srinivasa Amin after Urolift. He had a catheter placed post op for concerns of retention, which was removed without complication and he is voiding well. PVR is 79 ml today, and we are very happy to see he is having success emptying his bladder after the procedure. The patient is very happy. He denies hematuria and frequency and urgency of urination. 8/10/20  Pt has history of diabetic cystopathy with BPH and obstruction. He had a cystoscopy which showed a small obstructing gland, with severe bladder trabeculations, no obstructing median lobe. Flomax is not working, and he has elevated PVRs. He does not want a catheter. He is interested in Urolift. Risks benefits and alternative procedures are explained, informed consent is obtained, and the patient elects to proceed. 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 - Nocturia: 5-6 times  Worsening:  Yes  Duration: years  Pain:  none  Bladder scan:  295 ml  CT abd pelvis: report reviewed  - No gross  abnormalities.   Bladder reported normal.   Meds:  none   History:  none  PSA:  none    PMH: DM, MURALI  ELI:  None      Summary of old records: (Patient's old records, notes and chart reviewed and summarized above.)    Last several PSA's:  Lab Results   Component Value Date    PSA 0.78 03/24/2020       Last total testosterone:  No results found for: TESTOSTERONE    Urinalysis today:  No results found for this visit on 12/14/20. Last BUN and creatinine:  Lab Results   Component Value Date    BUN 12 12/10/2020     Lab Results   Component Value Date    CREATININE 0.8 12/10/2020       Imaging Reviewed during this Office Visit:   (results were independently reviewed by physician and radiology report verified)    PAST MEDICAL, FAMILY AND SOCIAL HISTORY:  Past Medical History:   Diagnosis Date    Bipolar 2 disorder ([address]     previously followed with Dr. Ezequiel Hernandez and Stevan Araujo in [facility] BPH (benign prostatic hyperplasia)     Diabetes mellitus type 2, uncontrolled ([address]     HgbA1c on 4/2/2019 was 9.1.     Diabetic peripheral neuropathy (HCC)     Diabetic polyneuropathy ([address]     Diabetic ulcer of right foot associated with type 2 diabetes mellitus ([address] 12/10/2015    Essential hypertension     \"never been on b/p medication that I know of\"    GERD (gastroesophageal reflux disease)     Hammer toe of left foot     Heart murmur     denies any chest pain or palpitations    History of tobacco abuse     Hx of AKA (above knee amputation), right ([address] 04/18/2019    Dr. Jennifer Daigle edema, diabetic, bilateral ([address] 05/04/2018    Dr. Rica Morris referred to retina specialist for 2nd opinion    Marijuana abuse in remission     Melena     MRSA (methicillin resistant staph aureus) culture positive     Onychomycosis     Other disorders of kidney and ureter in diseases classified elsewhere     Sleep apnea     no cpap    Thyroid disease     WD-Skin ulcer of fourth toe of right foot with necrosis of bone ([address] 6/29/2016     Past Surgical History:   Procedure Laterality Date    ABSCESS DRAINAGE Right     foot  AMPUTATION ABOVE KNEE Right 4/18/2019    RIGHT ABOVE KNEE AMPUTATION performed by Omero Ferreira MD at [address], LAPAROSCOPIC N/A 4/1/2020    ROBOTIC CHOLECYSTECTOMY performed by Lakshmi Pruett MD at [address] [facility] N/A 7/20/2020    CYSTOSCOPY performed [facility] Kwabena Negron [facility] at [facility] 8/21/2020    [facility] [place] performed by Kwabena Negron MD at [address] [facility] [address], DIAGNOSTIC      FOOT DEBRIDEMENT Right 07/01/2016    I & D    INCISION AND DRAINAGE Right 2/18/2019    I&D RIGHT STUMP performed by Omero Ferreira MD at [address] [facility] Right 07/20/2016    LEG AMPUTATION BELOW KNEE Right 4/4/2019    I&D AND REVISION OF AMPUTATION RIGHT LEG performed by Omero Ferreira MD at [address] [facility] Right 1/14/15    sole of foot I&D    PA DRAIN INFECT SHOULDER BURSA Left 8/18/2017    LEFT SHOULDER INCISION AND DRAINAGE performed by Omero Ferreira MD at [address] [facility] OFFICE/OUTPT [address] [facility] Right 9/20/2018    EXCISIONAL DEBRIDEMENT RIGHT BKA STUMP performed by Maximus Bautista MD at [address] Right 1/16/15    2nd toe with wound vac applied    UPPER GASTROINTESTINAL ENDOSCOPY N/A 3/3/2020    EGD DILATION SAVORY performed by Magen Sprague MD at [address] [facility]  ? when     Family History   Problem Relation Age of Onset    Diabetes Mother     Other Mother         pneumonia, H1N1    Depression Mother     Early Death Mother     High Blood Pressure Mother     High Cholesterol Mother     Vision Loss Maternal Grandmother     Arthritis Maternal Grandfather     Heart Disease Maternal Grandfather      No outpatient medications have been marked as taking for the 12/14/20 encounter (Office Visit) with Kwabena Negron MD.       Pcn [penicillins], Actos [pioglitazone], Clindamycin/lincomycin, and Vancomycin  Social History     Tobacco Use Smoking Status Former Smoker    Packs/day: 1.00    Years: 10.00    Pack years: 10.00    Types: Cigars    Start date: 1985   [facility] Quit date:     Years since quittin.3   Smokeless Tobacco Never Used       Social History     Substance and Sexual Activity   Alcohol Use Not Currently    Alcohol/week: 0.0 standard drinks       REVIEW OF SYSTEMS:  Constitutional: negative  Eyes: negative  Respiratory: negative  Cardiovascular: negative  Gastrointestinal: negative  Musculoskeletal: negative  Genitourinary: negative  Skin: negative   Neurological: negative  Hematological/Lymphatic: negative  Psychological: negative    Physical Exam:    This a 44 y.o. male   There were no vitals filed for this visit. Constitutional: Patient in no acute distress   Neuro: alert and oriented to person place and time. Psych: Mood and affect normal.  Head: atraumatic normocephalic  Eyes: EOMi  HEENT: neck supple, trachea midline  Lungs: Respiratory effort normal  Cardiovascular:  Normal peripheral pulses  Abdomen: Soft, non-tender, non-distended, No CVA  Bladder: non-tender and not distended. FROMx4, no cyanosis clubbing edema  Skin: warm and dry      Assessment and Plan      1. BPH with urinary obstruction           Plan:      No follow-ups on file. Follow up 1 year.    Continue timed voiding  Credee maneuver

## 2020-12-17 ENCOUNTER — TELEPHONE (OUTPATIENT)
Dept: WOUND CARE | Age: 50
End: 2020-12-17

## 2020-12-17 NOTE — TELEPHONE ENCOUNTER
Patient called to cancel his appointment. Patient stated his wound is scabbed over and he will call if he needs anything else.

## 2020-12-28 RX ORDER — GABAPENTIN 400 MG/1
400 CAPSULE ORAL 3 TIMES DAILY
Qty: 90 CAPSULE | Refills: 1 | Status: SHIPPED | OUTPATIENT
Start: 2020-12-28 | End: 2021-01-20 | Stop reason: SDUPTHER

## 2020-12-28 NOTE — PROCEDURES
[address] [place] [address]                                 EVENT MONITOR    PATIENT NAME: Manas Conklin                     :        1970  MED REC NO:   [id number]                           ROOM:  ACCOUNT NO:   [de-identified]                           ADMIT DATE: 2020  PROVIDER:     Layton Villasenor M.D. CLINICAL HISTORY AND INDICATION:  This is a patient with palpitation. EVENT MONITOR DESCRIPTION:  Event monitor was attached to the patient  between 2020 and 2020. EVENT MONITOR FINDINGS:  Baseline rhythm showed sinus rhythm. Otherwise  unremarkable event monitor. CONCLUSION:  1. Sinus rhythm. 2.  Unremarkable event monitor.         Viviane Sargent M.D.    D: 2020 10:29:07       T: 2020 11:41:19     [id number]  Job#: [id number]     Doc#: [id number]    CC:

## 2021-01-15 ENCOUNTER — OFFICE VISIT (OUTPATIENT)
Dept: UROLOGY | Age: 51
End: 2021-01-15
Payer: MEDICARE

## 2021-01-15 ENCOUNTER — PATIENT MESSAGE (OUTPATIENT)
Dept: INTERNAL MEDICINE CLINIC | Age: 51
End: 2021-01-15

## 2021-01-15 VITALS — TEMPERATURE: 96.4 F | HEIGHT: 66 IN | BODY MASS INDEX: 37.45 KG/M2 | WEIGHT: 233 LBS

## 2021-01-15 DIAGNOSIS — E11.65 UNCONTROLLED TYPE 2 DIABETES MELLITUS WITH HYPERGLYCEMIA, WITH LONG-TERM CURRENT USE OF INSULIN (HCC): ICD-10-CM

## 2021-01-15 DIAGNOSIS — G62.9 NEUROPATHY: ICD-10-CM

## 2021-01-15 DIAGNOSIS — N13.8 BPH WITH URINARY OBSTRUCTION: Primary | ICD-10-CM

## 2021-01-15 DIAGNOSIS — S78.111A ABOVE KNEE AMPUTATION OF RIGHT LOWER EXTREMITY (HCC): Primary | ICD-10-CM

## 2021-01-15 DIAGNOSIS — R33.9 URINARY RETENTION: ICD-10-CM

## 2021-01-15 DIAGNOSIS — N40.1 BPH WITH URINARY OBSTRUCTION: Primary | ICD-10-CM

## 2021-01-15 DIAGNOSIS — Z79.4 UNCONTROLLED TYPE 2 DIABETES MELLITUS WITH HYPERGLYCEMIA, WITH LONG-TERM CURRENT USE OF INSULIN (HCC): ICD-10-CM

## 2021-01-15 LAB — POST VOID RESIDUAL (PVR): 292 ML

## 2021-01-15 PROCEDURE — 99213 OFFICE O/P EST LOW 20 MIN: CPT | Performed by: UROLOGY

## 2021-01-15 PROCEDURE — 1036F TOBACCO NON-USER: CPT | Performed by: UROLOGY

## 2021-01-15 PROCEDURE — 51798 US URINE CAPACITY MEASURE: CPT | Performed by: UROLOGY

## 2021-01-15 PROCEDURE — G8484 FLU IMMUNIZE NO ADMIN: HCPCS | Performed by: UROLOGY

## 2021-01-15 PROCEDURE — G8427 DOCREV CUR MEDS BY ELIG CLIN: HCPCS | Performed by: UROLOGY

## 2021-01-15 PROCEDURE — 3017F COLORECTAL CA SCREEN DOC REV: CPT | Performed by: UROLOGY

## 2021-01-15 PROCEDURE — G8417 CALC BMI ABV UP PARAM F/U: HCPCS | Performed by: UROLOGY

## 2021-01-15 RX ORDER — ARIPIPRAZOLE 5 MG/1
5 TABLET ORAL DAILY
COMMUNITY
Start: 2020-12-16

## 2021-01-15 RX ORDER — FENOFIBRATE 145 MG/1
145 TABLET, COATED ORAL
Status: ON HOLD | COMMUNITY
Start: 2020-12-26 | End: 2021-07-27

## 2021-01-15 RX ORDER — TAMSULOSIN HYDROCHLORIDE 0.4 MG/1
0.4 CAPSULE ORAL DAILY
Qty: 30 CAPSULE | Refills: 3 | Status: SHIPPED | OUTPATIENT
Start: 2021-01-15 | End: 2021-03-17 | Stop reason: SDUPTHER

## 2021-01-15 NOTE — TELEPHONE ENCOUNTER
From: Deon Carl  To: MARILYN Connolly CNP  Sent: 1/15/2021 7:50 AM EST  Subject: Non-Urgent Medical Question    Yeah I would like to go to physical therapy.  Plus the past 2 nights my blood sugar's have dropped to between 65-72

## 2021-01-15 NOTE — PROGRESS NOTES
Dr. Nela Ewing MD  [facility]rology Clinic Consultation / New Patient Visit    Patient:  Estelle Rodrigez  YOB: 1970  Date: 1/15/2021  Consult requested from MARILYN Herrera CNP     HISTORY OF PRESENT ILLNESS:   The patient is a 44 y.o. male who presents today for follow-up for the following problem(s): BPH, bladder wall thickening  Overall the problem(s) : are worsening. Associated Symptoms: No dysuria, gross hematuria. Pain Severity:      Today visit:    1/15/21  Ricci Almodovar presents with increasing difficulty emptying bladder. Ricci Almodovar only presents after Urolift to help with his atonic bladder secondary to diabetic cystopath. PVR: 292 ml.      9/14/20  Today we see Ricci Almodovar after Urolift. He had a catheter placed post op for concerns of retention, which was removed without complication and he is voiding well. PVR is 79 ml today, and we are very happy to see he is having success emptying his bladder after the procedure. The patient is very happy. He denies hematuria and frequency and urgency of urination. 8/10/20  Pt has history of diabetic cystopathy with BPH and obstruction. He had a cystoscopy which showed a small obstructing gland, with severe bladder trabeculations, no obstructing median lobe. Flomax is not working, and he has elevated PVRs. He does not want a catheter. He is interested in Urolift. Risks benefits and alternative procedures are explained, informed consent is obtained, and the patient elects to proceed. 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 - Nocturia: 5-6 times  Worsening:  Yes  Duration: years  Pain:  none  Bladder scan:  295 ml  CT abd pelvis: report reviewed  - No gross  abnormalities.   Bladder reported normal.   Meds:  none   History:  none  PSA:  none    PMH: DM, MURALI  ELI:  None      Summary of old records:   (Patient's old records, notes and chart reviewed and summarized above.)    Last several PSA's:  Lab Results   Component Value Date    PSA 0.78 03/24/2020       Last total testosterone:  No results found for: TESTOSTERONE    Urinalysis today:  Results for POC orders placed in visit on 01/15/21   poct post void residual   Result Value Ref Range    post void residual 292 ml         Last BUN and creatinine:  Lab Results   Component Value Date    BUN 10 12/26/2020     Lab Results   Component Value Date    CREATININE 0.84 12/26/2020       Imaging Reviewed during this Office Visit:   (results were independently reviewed by physician and radiology report verified)    PAST MEDICAL, FAMILY AND SOCIAL HISTORY:  Past Medical History:   Diagnosis Date    Bipolar 2 disorder ([address]     previously followed with Dr. Carmel Avalos and Dyan De La Rosa in [facility] BPH (benign prostatic hyperplasia)     Diabetes mellitus type 2, uncontrolled ([address]     HgbA1c on 4/2/2019 was 9.1.     Diabetic peripheral neuropathy (HCC)     Diabetic polyneuropathy ([address]     Diabetic ulcer of right foot associated with type 2 diabetes mellitus ([address] 12/10/2015    Essential hypertension     \"never been on b/p medication that I know of\"    GERD (gastroesophageal reflux disease)     Hammer toe of left foot     Heart murmur     denies any chest pain or palpitations    History of tobacco abuse     Hx of AKA (above knee amputation), right ([address] 04/18/2019    Dr. Everlyn Eisenmenger edema, diabetic, bilateral ([address] 05/04/2018    Dr. Ridge Dillard referred to retina specialist for 2nd opinion    Marijuana abuse in remission     Melena     MRSA (methicillin resistant staph aureus) culture positive     Onychomycosis     Other disorders of kidney and ureter in diseases classified elsewhere     Sleep apnea     no cpap    Thyroid disease     WD-Skin ulcer of fourth toe of right foot with necrosis of bone ([address] 6/29/2016     Past Surgical History:   Procedure Laterality Date    ABSCESS DRAINAGE Right     foot    AMPUTATION ABOVE KNEE Right 4/18/2019    RIGHT ABOVE KNEE AMPUTATION performed by Navjot Schmitt MD at [address] [facility], LAPAROSCOPIC N/A 4/1/2020    ROBOTIC CHOLECYSTECTOMY performed by Dariel Hernandez MD at [address] N/A 7/20/2020    CYSTOSCOPY performed by Manuel Salazar MD at [address], [place] 8/21/2020    Ginny Arriaga performed by Manuel Salazar MD at [address], DIAGNOSTIC      FOOT DEBRIDEMENT Right 07/01/2016    I & D    INCISION AND DRAINAGE Right 2/18/2019    I&D RIGHT STUMP performed by Navjot Schmitt MD at [address] [facility] Right 07/20/2016    LEG AMPUTATION BELOW KNEE Right 4/4/2019    I&D AND REVISION OF AMPUTATION RIGHT LEG performed by Navjot Schmitt MD at [address] [facility] Right 1/14/15    sole of foot I&D    IA DRAIN INFECT SHOULDER BURSA Left 8/18/2017    LEFT SHOULDER INCISION AND DRAINAGE performed by Navjot Schmitt MD at [address] [facility] OFFICE/OUTPT [facility] Right 9/20/2018    EXCISIONAL DEBRIDEMENT RIGHT BKA STUMP performed by Kinjal Mejia MD at [address] [facility] Right 1/16/15    2nd toe with wound vac applied    UPPER GASTROINTESTINAL ENDOSCOPY N/A 3/3/2020    EGD DILATION SAVORY performed by Letfy Metzger MD at [address] [facility]  ? when     Family History   Problem Relation Age of Onset    Diabetes Mother     Other Mother         pneumonia, H1N1    Depression Mother     Early Death Mother     High Blood Pressure Mother     High Cholesterol Mother     Vision Loss Maternal Grandmother     Arthritis Maternal Grandfather     Heart Disease Maternal Grandfather      Outpatient Medications Marked as Taking for the 1/15/21 encounter (Office Visit) with Manuel Salazar MD   Medication Sig Dispense Refill    ARIPiprazole (ABILIFY) 5 MG tablet       fenofibrate (TRICOR) 145 MG tablet 145 mg      empagliflozin (JARDIANCE) 10 MG tablet Take 1 tablet by mouth daily 30 tablet 5    gabapentin (NEURONTIN) 400 MG capsule Take 1 capsule by mouth 3 times daily for 60 days. 90 capsule 1    insulin lispro (HUMALOG KWIKPEN U-200) 200 UNIT/ML SOPN pen Inject 35 Units into the skin 3 times daily (with meals) Plus scale - Max dose 150 units per day 15 pen 3    Insulin Glargine, 2 Unit Dial, 300 UNIT/ML SOPN Inject 90 Units into the skin 2 times daily 15 pen 5    metoprolol tartrate (LOPRESSOR) 50 MG tablet Take 1.5 tablets by mouth 2 times daily 90 tablet 3    levothyroxine (SYNTHROID) 50 MCG tablet Take 1 tablet by mouth daily 30 tablet 5    sertraline (ZOLOFT) 100 MG tablet Take 1 tablet by mouth daily 30 tablet 2    ondansetron (ZOFRAN ODT) 4 MG disintegrating tablet Take 1 tablet by mouth every 8 hours as needed for Nausea or Vomiting 20 tablet 0    pantoprazole (PROTONIX) 40 MG tablet Take 40 mg by mouth daily      blood glucose monitor kit and supplies Accu Chek Sheila meter.  Use to test blood sugars DX:E11.65 1 kit 0    blood glucose monitor strips Accucheck Sheila Test Strips Test blood sugars 4 times daily DX:E11.65 400 strip 5    Lancets MISC Use to test blood sugars 4 times daily DX:E11.65 400 each 5    oxybutynin (DITROPAN) 5 MG tablet Take 1 tablet by mouth 3 times daily 20 tablet 0    lisinopril (PRINIVIL;ZESTRIL) 5 MG tablet Take 1 tablet by mouth daily 30 tablet 0       Pcn [penicillins], Actos [pioglitazone], Clindamycin/lincomycin, and Vancomycin  Social History     Tobacco Use   Smoking Status Former Smoker    Packs/day: 1.00    Years: 10.00    Pack years: 10.00    Types: Cigars    Start date: 1985   [facility] Quit date:     Years since quittin.4   Smokeless Tobacco Never Used       Social History     Substance and Sexual Activity   Alcohol Use Not Currently    Alcohol/week: 0.0 standard drinks       REVIEW OF SYSTEMS:  Constitutional: negative  Eyes: negative  Respiratory: negative  Cardiovascular: negative  Gastrointestinal: negative  Musculoskeletal: negative  Genitourinary: negative  Skin: negative   Neurological: negative  Hematological/Lymphatic: negative  Psychological: negative    Physical Exam:    This a 44 y.o. male   Vitals:    01/15/21 0835   Temp: 96.4 °F (35.8 °C)     Constitutional: Patient in no acute distress   Neuro: alert and oriented to person place and time. Psych: Mood and affect normal.  Head: atraumatic normocephalic  Eyes: EOMi  HEENT: neck supple, trachea midline  Lungs: Respiratory effort normal  Cardiovascular:  Normal peripheral pulses  Abdomen: Soft, non-tender, non-distended, No CVA  Bladder: non-tender and not distended. FROMx4, no cyanosis clubbing edema  Skin: warm and dry      Assessment and Plan      1. BPH with urinary obstruction    2. Urinary retention           Plan:      No follow-ups on file. Return to flomax  Continue timed voiding  Credee maneuver    Return in 6-8 weeks  If still struggling or PVRs increasing will consider arzola with UDS and cysto  Possible candidate for Interstim? ?

## 2021-01-20 ENCOUNTER — TELEPHONE (OUTPATIENT)
Dept: INTERNAL MEDICINE CLINIC | Age: 51
End: 2021-01-20

## 2021-01-20 ENCOUNTER — PATIENT MESSAGE (OUTPATIENT)
Dept: INTERNAL MEDICINE CLINIC | Age: 51
End: 2021-01-20

## 2021-01-20 DIAGNOSIS — E11.65 UNCONTROLLED TYPE 2 DIABETES MELLITUS WITH HYPERGLYCEMIA, WITH LONG-TERM CURRENT USE OF INSULIN (HCC): ICD-10-CM

## 2021-01-20 DIAGNOSIS — G62.9 NEUROPATHY: ICD-10-CM

## 2021-01-20 DIAGNOSIS — Z79.4 UNCONTROLLED TYPE 2 DIABETES MELLITUS WITH HYPERGLYCEMIA, WITH LONG-TERM CURRENT USE OF INSULIN (HCC): ICD-10-CM

## 2021-01-20 RX ORDER — GABAPENTIN 400 MG/1
400 CAPSULE ORAL 2 TIMES DAILY
Qty: 60 CAPSULE | Refills: 2 | Status: SHIPPED | OUTPATIENT
Start: 2021-01-20 | End: 2021-02-22 | Stop reason: SDUPTHER

## 2021-01-20 NOTE — TELEPHONE ENCOUNTER
From: Jovon Hester  To: MARILYN Mabry - CNP  Sent: 1/20/2021 10:28 AM EST  Subject: Non-Urgent Medical Question    Yeah      ----- Message -----   From:MARILYN Burleson - CNP   Sent:1/20/2021 10:27 AM EST   To:Jamie Spangler   Subject:RE: Non-Urgent Medical Question    We had discussed reducing Gabapentin to 400 mg twice daily, as you felt it needed reduced, is this how you are taking it?       ----- Message -----   From:Jamie Spangler   Sent:1/20/2021 10:07 AM EST   To:MARILYN Dao - CNP   Subject:RE: Non-Urgent Medical Question    Gabapentin they said I had no more refills      ----- Message -----   From:MARILYN Burleson - CNP   Sent:1/20/2021 10:06 AM EST   To:Jamie Spangler   Subject:RE: Non-Urgent Medical Question    What prescription do you need?       ----- Message -----   From:Jamie Spangler   Sent:1/19/2021 11:01 PM EST   To:MARILYN Dao CNP   Subject:RE: Non-Urgent Medical Question    Did you fill my prescription      ----- Message -----   From:MARILYN Burleson - CNP   Sent:1/15/2021 9:47 AM EST   To:Jamie Spangler   Subject:RE: Non-Urgent Medical Question    I will refer you to physical med and rehab, so they can work with your amputation and your pain. Reduce your Tuojeo/long acting by 5 units and get me your meter with sugars and how much insulin you are taking on Monday please, I need to see where we're at. If you have anymore 60's during the night, reduce by another 5 units.       ----- Message -----   From:Jamie Spangler   Sent:1/15/2021 7:50 AM EST   To:MARILYN Dao CNP   Subject:Non-Urgent Medical Question    Yeah I would like to go to physical therapy.  Plus the past 2 nights my blood sugar's have dropped to between 65-72

## 2021-01-20 NOTE — TELEPHONE ENCOUNTER
Dr Viannye Carrasco is no longer at [facility]. I set the patient up with Dr Sean Doty for 1/28/21 at 1:00pm.  I left a voicemail of this on the patients voicemail.

## 2021-01-20 NOTE — TELEPHONE ENCOUNTER
Controlled Substance Monitoring:    Acute and Chronic Pain Monitoring:   RX Monitoring 1/20/2021   Attestation -   Acute Pain Prescriptions -   Periodic Controlled Substance Monitoring No signs of potential drug abuse or diversion identified.

## 2021-01-22 ENCOUNTER — OFFICE VISIT (OUTPATIENT)
Dept: INTERNAL MEDICINE CLINIC | Age: 51
End: 2021-01-22
Payer: MEDICARE

## 2021-01-22 VITALS — TEMPERATURE: 98.6 F | SYSTOLIC BLOOD PRESSURE: 142 MMHG | HEART RATE: 80 BPM | DIASTOLIC BLOOD PRESSURE: 68 MMHG

## 2021-01-22 DIAGNOSIS — G54.6 PAIN, PHANTOM LIMB (HCC): ICD-10-CM

## 2021-01-22 DIAGNOSIS — R25.2 CRAMP IN LOWER LEG: ICD-10-CM

## 2021-01-22 DIAGNOSIS — G62.9 NEUROPATHY: ICD-10-CM

## 2021-01-22 DIAGNOSIS — E11.65 UNCONTROLLED TYPE 2 DIABETES MELLITUS WITH HYPERGLYCEMIA, WITH LONG-TERM CURRENT USE OF INSULIN (HCC): Primary | ICD-10-CM

## 2021-01-22 DIAGNOSIS — Z79.4 UNCONTROLLED TYPE 2 DIABETES MELLITUS WITH HYPERGLYCEMIA, WITH LONG-TERM CURRENT USE OF INSULIN (HCC): Primary | ICD-10-CM

## 2021-01-22 DIAGNOSIS — E78.5 HYPERLIPIDEMIA LDL GOAL <70: ICD-10-CM

## 2021-01-22 DIAGNOSIS — F33.41 RECURRENT MAJOR DEPRESSIVE DISORDER, IN PARTIAL REMISSION (HCC): ICD-10-CM

## 2021-01-22 DIAGNOSIS — I10 ESSENTIAL HYPERTENSION: ICD-10-CM

## 2021-01-22 DIAGNOSIS — E03.9 HYPOTHYROIDISM, UNSPECIFIED TYPE: ICD-10-CM

## 2021-01-22 DIAGNOSIS — S78.111A ABOVE KNEE AMPUTATION OF RIGHT LOWER EXTREMITY (HCC): ICD-10-CM

## 2021-01-22 DIAGNOSIS — M79.672 ACUTE FOOT PAIN, LEFT: ICD-10-CM

## 2021-01-22 DIAGNOSIS — E55.9 VITAMIN D DEFICIENCY: ICD-10-CM

## 2021-01-22 LAB
CHP ED QC CHECK: ABNORMAL
GLUCOSE BLD-MCNC: 348 MG/DL
HBA1C MFR BLD: 10.3 % (ref 4.3–5.7)

## 2021-01-22 PROCEDURE — G8484 FLU IMMUNIZE NO ADMIN: HCPCS | Performed by: NURSE PRACTITIONER

## 2021-01-22 PROCEDURE — G8427 DOCREV CUR MEDS BY ELIG CLIN: HCPCS | Performed by: NURSE PRACTITIONER

## 2021-01-22 PROCEDURE — 1036F TOBACCO NON-USER: CPT | Performed by: NURSE PRACTITIONER

## 2021-01-22 PROCEDURE — 3046F HEMOGLOBIN A1C LEVEL >9.0%: CPT | Performed by: NURSE PRACTITIONER

## 2021-01-22 PROCEDURE — 3017F COLORECTAL CA SCREEN DOC REV: CPT | Performed by: NURSE PRACTITIONER

## 2021-01-22 PROCEDURE — 83036 HEMOGLOBIN GLYCOSYLATED A1C: CPT | Performed by: NURSE PRACTITIONER

## 2021-01-22 PROCEDURE — G8417 CALC BMI ABV UP PARAM F/U: HCPCS | Performed by: NURSE PRACTITIONER

## 2021-01-22 PROCEDURE — 2022F DILAT RTA XM EVC RTNOPTHY: CPT | Performed by: NURSE PRACTITIONER

## 2021-01-22 PROCEDURE — 82962 GLUCOSE BLOOD TEST: CPT | Performed by: NURSE PRACTITIONER

## 2021-01-22 PROCEDURE — 99214 OFFICE O/P EST MOD 30 MIN: CPT | Performed by: NURSE PRACTITIONER

## 2021-01-22 RX ORDER — EMPAGLIFLOZIN 10 MG/1
1 TABLET, FILM COATED ORAL DAILY
Qty: 90 TABLET | Refills: 1 | Status: SHIPPED | OUTPATIENT
Start: 2021-01-22 | End: 2021-03-17 | Stop reason: SDUPTHER

## 2021-01-22 RX ORDER — LISINOPRIL 5 MG/1
5 TABLET ORAL DAILY
Qty: 90 TABLET | Refills: 1 | Status: SHIPPED | OUTPATIENT
Start: 2021-01-22 | End: 2021-03-17 | Stop reason: SDUPTHER

## 2021-01-22 RX ORDER — GLUCOSAMINE HCL/CHONDROITIN SU 500-400 MG
CAPSULE ORAL
Qty: 400 STRIP | Refills: 3 | Status: SHIPPED
Start: 2021-01-22 | End: 2021-04-06 | Stop reason: SDUPTHER

## 2021-01-22 ASSESSMENT — ENCOUNTER SYMPTOMS
SHORTNESS OF BREATH: 0
VOMITING: 0
COUGH: 0
DIARRHEA: 0
TROUBLE SWALLOWING: 0
CONSTIPATION: 1
NAUSEA: 0
ABDOMINAL PAIN: 0

## 2021-01-22 NOTE — PROGRESS NOTES
[facility]  [address] [facility] [address]  Dept: [phone]  Dept Fax: [phone]  Loc: [phone]     Visit Date:  1/22/2021    Patient:  Hakeem Ordoñez  YOB: 1970    HPI:     Chief Complaint   Patient presents with    Diabetes    Hypothyroidism    Hypertension    Foot Pain     on the left also toe pain       Here for primary care follow-up. This patient has a complex medical history, with multiple complications, many of which are due to years of uncontrolled diabetes. DM - 10.3%, was 9.7% in October. Glucose this am 348. He has been reporting lows to me through My Chart, and we have been reducing his insulin. States this shot up up to 300 last night after 20 ounces mountain dew and some frozen yogurt at midnight last night when his glucose was 82. He states he gets symptomatic in the eighties, with cold sweats and tremors. He does report overall that his sugars are actually much better and his hyperhidrosis he was experiencing has resolved. He only has sweating with lows or extreme highs now. Tuojeo 85 units BID, 40 units with meals. He states he has made a lot of changes. He has been attempting to watch his carb intake per his report and also states he adopted somewhat of a vegan diet. He is planning on working out at [employer], resistance and [employer] His insurance will pay for this. Got a letter from [address] [place] [address] endocrinology - can schedule in with them, was originally told they didn't take his insurance. BP today 142/68. He has been off of his lisinopril. He has difficulty getting to the pharmacy, and when his prescriptions are 30-day he often does not get refills timely, I confirmed this with a list of his last 90 days worth of medication refills from the pharmacy. We will update his prescriptions to reflect 90-day dosing. Neuropathy - He has multiple scabs on his left leg. States he hits this on things, as he uses his left leg to pull his wheelchair around a lot. He also has concerns about his hands, he is starting to have some numbness in his fingertips, he has what appears to be some muscle loss in that hand. Left ankle pain - Rolled his ankle laterally a few days ago, and his foot has been hurting since. He did not hear a pop or snap. He finds this odd since he has such significant neuropathy. He is having aching pain in the arch of his foot. He also fell trying to lean out of his wheelchair to get something from behind his bed and forgot to lock his wheelchair. Right lower limb amputation - He has gotten a prosthetic, but reports he lost a lot of weight and his prosthetic doesn't fit anymore. Therefore, he never moved forward with this. He does experience phantom limb type of shooting pain in the right lower leg amputation. He is wheelchair-bound due to not using a prosthetic, and I think patient would benefit from some type of evaluation for these multiple symptoms that he experiences, and for recommendations for general rehabilitation. Hypothyroidism - Off Synthroid at least one month. He had a 30 days supply Rx sent in and didn't get a refill picked up. I will recheck his TSH to evaluate need for this medication. Denies thyroid s/s including dysphagia, dysphonia, brittle hair/nails, gritty dry eyes, fatigue, hot/cold intolerance. BPHsaw Dr. Thierry Rankin, having some high post void residuals, 292 last office visit. May need another surgery. 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 This gives me pause with increasing Jardiance, and may be a reason to stop this medication. Will attempt to maximize insulin therapy prior to discontinuing Jardiance, will send a message to Dr. Arleen Lacy for guidance. VIt D def - Taking vitamin D 1000 IU gummies. Last vitamin D level 14 on 10/2020. Diarrheathis is much improved.  He has been following with Dr. Camden Cowan . He is now mildly constipated, advised to increase his water and fiber intake. Medications    Current Outpatient Medications:     blood glucose monitor kit and supplies, Accu Chek Sheila meter. Use to test blood sugars DX:E11.65, Disp: 1 kit, Rfl: 0    insulin lispro (HUMALOG KWIKPEN U-200) 200 UNIT/ML SOPN pen, Inject 40 Units into the skin 3 times daily (with meals) Plus scale - Max dose 150 units per day, Disp: 18 pen, Rfl: 1    lisinopril (PRINIVIL;ZESTRIL) 5 MG tablet, Take 1 tablet by mouth daily, Disp: 90 tablet, Rfl: 1    empagliflozin (JARDIANCE) 10 MG tablet, Take 1 tablet by mouth daily, Disp: 90 tablet, Rfl: 1    gabapentin (NEURONTIN) 400 MG capsule, Take 1 capsule by mouth 2 times daily for 60 days. , Disp: 60 capsule, Rfl: 2    ARIPiprazole (ABILIFY) 5 MG tablet, Take 5 mg by mouth daily , Disp: , Rfl:     fenofibrate (TRICOR) 145 MG tablet, 145 mg, Disp: , Rfl:     tamsulosin (FLOMAX) 0.4 MG capsule, Take 1 capsule by mouth daily Take one capsule daily to facilitate passage of ureteral stone, Disp: 30 capsule, Rfl: 3    Insulin Glargine, 2 Unit Dial, 300 UNIT/ML SOPN, Inject 90 Units into the skin 2 times daily, Disp: 15 pen, Rfl: 5    metoprolol tartrate (LOPRESSOR) 50 MG tablet, Take 1.5 tablets by mouth 2 times daily, Disp: 90 tablet, Rfl: 3    levothyroxine (SYNTHROID) 50 MCG tablet, Take 1 tablet by mouth daily, Disp: 30 tablet, Rfl: 5    sertraline (ZOLOFT) 100 MG tablet, Take 1 tablet by mouth daily, Disp: 30 tablet, Rfl: 2    pantoprazole (PROTONIX) 40 MG tablet, Take 40 mg by mouth daily, Disp: , Rfl:     blood glucose monitor strips, Accucheck Sheila Test Strips Test blood sugars 4 times daily DX:E11.65, Disp: 400 strip, Rfl: 5    Lancets MISC, Use to test blood sugars 4 times daily DX:E11.65, Disp: 400 each, Rfl: 5    Insulin Syringe-Needle U-100 29G X 1/2\" 0.3 ML MISC, 1 each by Does not apply route 4 times daily (after meals and at bedtime), Disp: 200 each, Rfl: 0    The patient is allergic to pcn [penicillins]; actos [pioglitazone]; clindamycin/lincomycin; and vancomycin. Past Medical History  Ricci Almodovar  has a past medical history of Bipolar 2 disorder ([address] BPH (benign prostatic hyperplasia), Diabetes mellitus type 2, uncontrolled ([address] Diabetic peripheral neuropathy ([address] Diabetic polyneuropathy ([address] Diabetic ulcer of right foot associated with type 2 diabetes mellitus ([address] Essential hypertension, GERD (gastroesophageal reflux disease), Hammer toe of left foot, Heart murmur, History of tobacco abuse, Hx of AKA (above knee amputation), right ([address] Macular edema, diabetic, bilateral ([address] Marijuana abuse in remission, Melena, MRSA (methicillin resistant staph aureus) culture positive, Onychomycosis, Other disorders of kidney and ureter in diseases classified elsewhere, Sleep apnea, Thyroid disease, and 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 Past Surgical History  The patient  has a past surgical history that includes other surgical history (Right, 1/14/15); Abscess Drainage (Right); Toe amputation (Right, 1/16/15); Foot Debridement (Right, 07/01/2016); Leg amputation below knee (Right, 07/20/2016); [place] tooth extraction (?when); pr drain infect shoulder bursa (Left, 8/18/2017); pr office/outpt visit,procedure only (Right, 9/20/2018); incision and drainage (Right, 2/18/2019); Leg amputation below knee (Right, 4/4/2019); AMPUTATION ABOVE KNEE (Right, 4/18/2019); Upper gastrointestinal endoscopy (N/A, 3/3/2020); Cholecystectomy, laparoscopic (N/A, 4/1/2020); Endoscopy, colon, diagnostic; Cystoscopy (N/A, 7/20/2020); and Cystoscopy (N/A, 8/21/2020).     Family History  This patient's family history includes Arthritis in his maternal grandfather; Depression in his mother; Diabetes in his mother; Early Death in his mother; Heart Disease in his maternal grandfather; High Blood Pressure in his mother; High Cholesterol in his mother; Other in his mother; Vision Loss in his maternal grandmother. Social History  Calli Yang  reports that he quit smoking about 20 years ago. His smoking use included cigars. He started smoking about 36 years ago. He has a 10.00 pack-year smoking history. He has never used smokeless tobacco. He reports previous alcohol use. He reports that he does not use drugs. Health Maintenance:    Health Maintenance   Topic Date Due    Hepatitis B vaccine (1 of 3 - Risk 3-dose series) 01/23/1987    DTaP/Tdap/Td vaccine (1 - Tdap) 01/23/1987    Shingles Vaccine (1 of 2) 01/23/2018    Colon cancer screen colonoscopy  01/23/2018    Diabetic retinal exam  05/09/2019    Annual Wellness Visit (AWV)  06/04/2019    Diabetic microalbuminuria test  10/10/2019    Flu vaccine (1) 01/22/2022 (Originally 9/1/2020)    A1C test (Diabetic or Prediabetic)  04/22/2021    Lipid screen  10/08/2021    TSH testing  12/10/2021    Potassium monitoring  12/26/2021    Creatinine monitoring  12/26/2021    Diabetic foot exam  01/22/2022    Pneumococcal 0-64 years Vaccine  Completed    COVID-19 Vaccine  Completed    Hepatitis C screen  Completed    HIV screen  Completed    Hepatitis A vaccine  Aged Out    Hib vaccine  Aged Out    Meningococcal (ACWY) vaccine  Aged Out       Subjective:      Review of Systems   Constitutional: Negative for chills, fatigue and fever. HENT: Negative for trouble swallowing. Eyes: Negative for visual disturbance. Respiratory: Negative for cough and shortness of breath. Cardiovascular: Negative for chest pain and leg swelling. Gastrointestinal: Positive for constipation (At times). Negative for abdominal pain, diarrhea, nausea and vomiting. Endocrine: Negative for cold intolerance, heat intolerance, polydipsia, polyphagia and polyuria. Genitourinary: Negative for difficulty urinating. Musculoskeletal: Negative for arthralgias and myalgias.         Right lower leg amputee   Skin: Positive for wound (Scabs to left lower leg due to traumas). Negative for rash. Neurological: Negative for numbness. In wheelchair. Objective:     BP (!) 142/68 (Site: Left Upper Arm)   Pulse 80   Temp 98.6 °F (37 °C)     Physical Exam  Vitals signs reviewed. Constitutional:       General: He is not in acute distress. Appearance: He is well-developed. He is not diaphoretic. HENT:      Head: Normocephalic and atraumatic. Neck:      Musculoskeletal: Normal range of motion and neck supple. No muscular tenderness. Thyroid: No thyromegaly. Cardiovascular:      Rate and Rhythm: Normal rate and regular rhythm. Heart sounds: Normal heart sounds. No murmur. No friction rub. No gallop. Pulmonary:      Effort: Pulmonary effort is normal. No respiratory distress. Breath sounds: Normal breath sounds. No wheezing or rales. Abdominal:      General: There is no distension. Palpations: Abdomen is soft. Tenderness: There is no abdominal tenderness. Musculoskeletal: Normal range of motion. General: No tenderness. Left lower leg: Edema (Trace) present. Comments: Right lower leg amputee   Lymphadenopathy:      Cervical: No cervical adenopathy. Skin:     General: Skin is warm and dry. Coloration: Skin is not pale. Findings: No erythema or rash. Comments: Multiple scabs to left lower leg, none appear infected, not red or warm. No drainage noted. All open to air. Neurological:      General: No focal deficit present. Mental Status: He is alert and oriented to person, place, and time. Psychiatric:         Mood and Affect: Mood normal.         Behavior: Behavior normal.         Thought Content: Thought content normal.         Judgment: Judgment normal.      Comments: Much improved mood, affect is bright.        Diabetic foot exam    Visual inspection:  Deformity/amputation: present -right AKA, left small toe amputation. Skin lesions/pre-ulcerative calluses: present -anterior left shin. No wounds on left foot, however most of the skin is dry and multiple heavy calluses exist  Edema: right- N/A, left- trace    Sensory exam:  Monofilament sensation: abnormal -markedly reduced  (minimum of 5 random plantar locations tested, avoiding callused areas - > 1 area with absence of sensation is + for neuropathy)    Plus at least one of the following:  Pulses: normal,   Pinprick: N/A  Proprioception: N/A  Vibration (128 Hz): N/A      Labs Reviewed 1/22/2021:    Lab Results   Component Value Date    WBC 10.9 (H) 12/26/2020    HGB 16.3 12/26/2020    HCT 47.4 12/26/2020     12/26/2020    CHOL 161 10/10/2018    TRIG 328 (H) 10/10/2018    HDL 27 10/08/2020    ALT 27 12/26/2020    AST 25 12/26/2020     (L) 12/26/2020    K 3.6 12/26/2020    CL 99 (L) 12/26/2020    CREATININE 0.84 12/26/2020    BUN 10 12/26/2020    CO2 26 12/26/2020    TSH 2.320 12/10/2020    PSA 0.78 03/24/2020    INR 1.04 12/26/2020    GLUF 341 (H) 10/08/2020    LABA1C 10.3 (H) 01/22/2021    LABMICR 1.21 10/10/2018       Assessment/Plan      1. Uncontrolled type 2 diabetes mellitus with hyperglycemia, with long-term current use of insulin (HCC)  His meter was broken last night per his report. I sent in a new prescription for a new meter. He has been reporting symptomatic lows in the sixties to eighties, I would like to get data to support the next insulin changes, he has actually not reduced his insulin as he was advised. Check glucose fasting, before all meals, and before bedtime. He agrees to download his meter Monday or Tuesday next week when he has other appointments. Check labs for contributing factors. Schedule dilated eye exam.  See podiatry for diabetic foot care. Continue gabapentin for neuropathy. We will send in 90-day scripts as appropriate.   Agree with increased exercise, increase attention to diet, increase water intake and reduction in simple carbs and sugars. Encouraged his efforts. I will see him back in 4 weeks, but we will do frequent meter downloads in the meantime to get his sugars and to check. Call [facility] and schedule with endocrinology, as patient has been difficult to control diabetic for many years, he has not tolerated multiple medications including Actos due to leg swelling, Metformin due to GI upset, diarrhea and chronic acidosis, GLP-1 and DPP-4 due to abdominal pain and elevated pancreatic enzymes, and currently question if we should continue with Jardiance due to his high postvoid residual. I will consult with Dr. Meagan Adamson about this, message sent. - POCT glycosylated hemoglobin (Hb A1C)  - POCT Glucose  - blood glucose monitor kit and supplies; Accu Chek Sheila meter. Use to test blood sugars DX:E11.65  Dispense: 1 kit; Refill: 0  - TSH With Reflex Ft4; Future  - Comprehensive Metabolic Panel, Fasting; Future  - Triglycerides; Future  - CBC With Auto Differential; Future  - Vitamin D 25 Hydroxy; Future  - External Referral To Podiatry  -  DIABETES FOOT EXAM  - Magnesium; Future  - insulin lispro (HUMALOG KWIKPEN U-200) 200 UNIT/ML SOPN pen; Inject 40 Units into the skin 3 times daily (with meals) Plus scale - Max dose 150 units per day  Dispense: 18 pen; Refill: 1  - lisinopril (PRINIVIL;ZESTRIL) 5 MG tablet; Take 1 tablet by mouth daily  Dispense: 90 tablet; Refill: 1  - empagliflozin (JARDIANCE) 10 MG tablet; Take 1 tablet by mouth daily  Dispense: 90 tablet; Refill: 1  - Microalbumin / Creatinine Urine Ratio; Future    2. Neuropathy  - TSH With Reflex Ft4; Future  - Comprehensive Metabolic Panel, Fasting; Future  - CBC With Auto Differential; Future  - Vitamin D 25 Hydroxy; Future  - External Referral To Podiatry  - Vitamin B12 & Folate; Future  -  DIABETES FOOT EXAM    3.  Above knee amputation of right lower extremity ([address]  - External Referral To Podiatry  -  DIABETES FOOT EXAM  - empagliflozin (JARDIANCE) 10 MG tablet; Take 1 tablet by mouth daily  Dispense: 90 tablet; Refill: 1    4. Pain, phantom limb ([address]  Status post above-knee amputation of right lower leg. Due to the combination of his severe diabetic neuropathy and what I suspect is phantom limb pain, in addition to the fact that he has remained wheelchair-bound and has not been able to move forward with prosthetic use he states due to weight loss (although I do not have documentation of any excessive weight loss) I feel he would benefit from an evaluation for what options for his rehabilitation would be most appropriate and if indeed he is experiencing phantom limb pain what modalities would best treat this. Also due to his muscle mass loss, which I feel is likely due to his uncontrolled diabetes, I feel he may benefit from further work-up from physical medicine and rehabilitation standpoint. He is currently on gabapentin 400 mg twice daily and states this is effective for him at this time. - External Referral To Podiatry  - Vitamin B12 & Folate; Future  -  DIABETES FOOT EXAM    5. Hypothyroidism, unspecified type  Recheck TSH, he has been off Synthroid for at least a month. - TSH With Reflex Ft4; Future  - CBC With Auto Differential; Future    6. Hyperlipidemia LDL goal <70  Last triglycerides 252, and he had elevated pancreatic enzymes, Dr. Michael Saez started him on fenofibrate. We will recheck this and an LDL. He is not on a statin, and should be with his diabetes. I will discuss this with him at next visit. - Triglycerides; Future  - CBC With Auto Differential; Future    7. Essential hypertension  Blood pressure 142/68. We will restart lisinopril due to diabetes. - CBC With Auto Differential; Future  - lisinopril (PRINIVIL;ZESTRIL) 5 MG tablet; Take 1 tablet by mouth daily  Dispense: 90 tablet; Refill: 1    8. Vitamin D deficiency  Recheck vitamin D level. Continues on vitamin D 1000 IU daily. - Comprehensive Metabolic Panel, Fasting;  Future  - CBC With Auto Differential; Future  - Vitamin D 25 Hydroxy; Future  - Vitamin B12 & Folate; Future    9. Acute foot pain, left  Status post traumatic injury, he rolled his foot. We will get x-rays and advise him of these after completion. Advised Tylenol and/or ibuprofen for pain. He can put an Ace wrap to support the joint. - XR FOOT LEFT (MIN 3 VIEWS); Future    10. Cramp in lower leg  We will evaluate electrolytes due to Jardiance. - TSH With Reflex Ft4; Future  - Comprehensive Metabolic Panel, Fasting; Future  - CBC With Auto Differential; Future  - Vitamin D 25 Hydroxy; Future  - Vitamin B12 & Folate; Future  - Magnesium; Future    11. Depression/anxiety/history of bipolar disorder. He is on Abilify 5 mg through [employer] in addition to his Zoloft 100 mg daily. His mood is much improved and he reports he is feeling much better. [facility] Advised him to continue with this as it seems very helpful to him. Return in about 4 weeks (around 2/19/2021). Patient given educational materials - see patient instructions. Discussed use, benefit, and side effects of prescribed medications. All patient questions answered. Pt voiced understanding. Reviewed health maintenance and discussed with patient. Due to his Medicaid status he is advised to present to the health department for hepatitis B, tetanus, shingles vaccines. He declines flu vaccine as he states he is already had the flu. Advised patient to schedule a yearly dilated eye exam with ophthalmology, he has previously seen Dr. Agatha Jackson office for this, so he will call and reschedule. We will schedule an annual wellness visit.          Electronically signed MARILYN Hill CNP on 1/22/21 at 9:10 AM EST

## 2021-01-22 NOTE — PATIENT INSTRUCTIONS
Schedule yearly dilated eye exam with ophthalmology. Dr. Ute Al office    Health department for shingles, tetanus and hep B vaccines. Schedule Annual Wellness Visit. Check sugars fasting, before meals and before bed, get me your meter early next for a download and insulin adjustment. Left foot xray, podiatry for foot care. Tylenol or Ibuprofen for pain. Elevate foot. ACE wrap that foot and ankle. Keep scabbed areas clean and dry, apply antibiotic ointment to these as needed. Labs next able - fasting. Patient Education        Diabetes Blood Sugar Emergencies: Your Action Plan  How can you prevent a blood sugar emergency? An important part of living with diabetes is keeping your blood sugar in your target range. You'll need to know what to do if it's too high or too low. Managing your blood sugar levels helps you avoid emergencies. This care sheet will teach you about the signs of high and low blood sugar. It will help you make an action plan with your doctor for when these signs occur. Low blood sugar is more likely to happen if you take certain medicines for diabetes. It can also happen if you skip a meal, drink alcohol, or exercise more than usual.  You may get high blood sugar if you eat differently than you normally do. One example is eating more carbohydrate than usual. Having a cold, the flu, or other sudden illness can also cause high blood sugar levels. Levels can also rise if you miss a dose of medicine. Any change in how you take your medicine may affect your blood sugar level. So it's important to work with your doctor before you make any changes. Check your blood sugar  Work with your doctor to fill in the blank spaces below that apply to you. Track your levels, know your target range, and write down ways you can get your blood sugar back in your target range. A log book can help you track your levels. Take the book to all of your medical appointments. · Check your blood sugar _____ times a day, at these times:________________________________________________. (For example: Before meals, at bedtime, before exercise, during exercise, other.)  · Your blood sugar target range before a meal is ___________________. Your blood sugar target range after a meal is _______________________. · Do this___________________________________________________to get your blood sugar back within your safe range if your blood sugar results are _________________________________________. (For example: Less than 70 or above 250 mg/dL.)  Call your doctor when your blood sugar results are ___________________________________. (For example: Less than 70 or above 250 mg/dL.)  What are the symptoms of low and high blood sugar? Common symptoms of low blood sugar are sweating and feeling shaky, weak, hungry, or confused. Symptoms can start quickly. Common symptoms of high blood sugar are feeling very thirsty or very hungry. You may also pass urine more often than usual. You may have blurry vision and may lose weight without trying. But some people may have high or low blood sugar without having any symptoms. That's a good reason to check your blood sugar on a regular schedule. What should you do if you have symptoms? Work with your doctor to fill in the blank spaces below that apply to you. Low blood sugar  If you have symptoms of low blood sugar, check your blood sugar. If it's below _____ ( for example, below 70), eat or drink a quick-sugar food that has about 15 grams of carbohydrate. Your goal is to get your level back to your safe range. Check your blood sugar again 15 minutes later. If it's still not in your target range, take another 15 grams of carbohydrate and check your blood sugar again in 15 minutes. Repeat this until you reach your target. Then go back to your regular testing schedule. Children usually need less than 15 grams of carbohydrate. Check with your doctor or diabetes educator for the amount that is right for your child. When you have low blood sugar, it's best to stop or reduce any physical activity until your blood sugar is back in your target range and is stable. If you must stay active, eat or drink 30 grams of carbohydrate. Then check your blood sugar again in 15 minutes. If it's not in your target range, take another 30 grams of carbohydrates. Check your blood sugar again in 15 minutes. Keep doing this until you reach your target. You can then go back to your regular testing schedule. If your symptoms or blood sugar levels are getting worse or have not improved after 15 minutes, seek medical care right away. Here are some examples of quick-sugar foods with 15 grams of carbohydrate:  · 3 or 4 glucose tablets  · 1 tablespoon (3 teaspoons) table sugar  · ½ cup to ¾ cup (4 to 6 ounces) of fruit juice or regular (not diet) soda  · Hard candy (such as 6 Life Savers)  High blood sugar  If you have symptoms of high blood sugar, check your blood sugar. Your goal is to get your level back to your target range. If it's above ______ ( for example, above 250), follow these steps:  · If you missed a dose of your diabetes medicine, take it now. Take only the amount of medicine that you have been prescribed. Do not take more or less medicine. · Give yourself insulin if your doctor has prescribed it for high blood sugar. · Test for ketones, if the doctor told you to do so. If the results of the ketone test show a moderate-to-large amount of ketones, call the doctor for advice. · Wait 30 minutes after you take the extra insulin or the missed medicine. Check your blood sugar again. If your symptoms or blood sugar levels are getting worse or have not improved after taking these steps, seek medical care right away. Follow-up care is a key part of your treatment and safety. Be sure to make and go to all appointments, and call your doctor if you are having problems. It's also a good idea to know your test results and keep a list of the medicines you take. Where can you learn more? Go to [email or web address] and sign in to your [employer] account. Enter G272 in the [employer] box to learn more about \"Diabetes Blood Sugar Emergencies: Your Action Plan. \"     If you do not have an account, please click on the \"Sign Up Now\" link. Current as of: December 20, 2019               Content Version: 12.6  © 9301-5161 [employer]. Care instructions adapted under license by [facility] ([facility]). If you have questions about a medical condition or this instruction, always ask your healthcare professional. [address] any warranty or liability for your use of this information.

## 2021-02-08 ENCOUNTER — TELEPHONE (OUTPATIENT)
Dept: INTERNAL MEDICINE CLINIC | Age: 51
End: 2021-02-08

## 2021-02-08 ENCOUNTER — OFFICE VISIT (OUTPATIENT)
Dept: UROLOGY | Age: 51
End: 2021-02-08
Payer: MEDICARE

## 2021-02-08 VITALS — BODY MASS INDEX: 37.45 KG/M2 | WEIGHT: 233 LBS | HEIGHT: 66 IN | TEMPERATURE: 96.6 F

## 2021-02-08 DIAGNOSIS — N40.1 BPH WITH URINARY OBSTRUCTION: Primary | ICD-10-CM

## 2021-02-08 DIAGNOSIS — N13.8 BPH WITH URINARY OBSTRUCTION: Primary | ICD-10-CM

## 2021-02-08 LAB
BILIRUBIN URINE: NEGATIVE
BLOOD URINE, POC: ABNORMAL
CHARACTER, URINE: CLEAR
COLOR, URINE: YELLOW
GLUCOSE URINE: 500 MG/DL
KETONES, URINE: NEGATIVE
LEUKOCYTE CLUMPS, URINE: NEGATIVE
NITRITE, URINE: NEGATIVE
PH, URINE: 6 (ref 5–9)
POST VOID RESIDUAL (PVR): 67 ML
PROTEIN, URINE: NEGATIVE MG/DL
SPECIFIC GRAVITY, URINE: 1.02 (ref 1–1.03)
UROBILINOGEN, URINE: 0.2 EU/DL (ref 0–1)

## 2021-02-08 PROCEDURE — 99213 OFFICE O/P EST LOW 20 MIN: CPT | Performed by: UROLOGY

## 2021-02-08 PROCEDURE — 3017F COLORECTAL CA SCREEN DOC REV: CPT | Performed by: UROLOGY

## 2021-02-08 PROCEDURE — 81003 URINALYSIS AUTO W/O SCOPE: CPT | Performed by: UROLOGY

## 2021-02-08 PROCEDURE — G8417 CALC BMI ABV UP PARAM F/U: HCPCS | Performed by: UROLOGY

## 2021-02-08 PROCEDURE — 1036F TOBACCO NON-USER: CPT | Performed by: UROLOGY

## 2021-02-08 PROCEDURE — G8427 DOCREV CUR MEDS BY ELIG CLIN: HCPCS | Performed by: UROLOGY

## 2021-02-08 PROCEDURE — 51798 US URINE CAPACITY MEASURE: CPT | Performed by: UROLOGY

## 2021-02-08 PROCEDURE — G8484 FLU IMMUNIZE NO ADMIN: HCPCS | Performed by: UROLOGY

## 2021-02-08 NOTE — TELEPHONE ENCOUNTER
Pt called asking for a new script for gabapentin. Dr. Delisa Trent has increased from bid. To qid. Per vo from [facility] I left a message on pts voicemail to get the refill from Dr Delisa Trent as she has increased his dose.

## 2021-02-08 NOTE — PROGRESS NOTES
Dr. Arielle Diamond MD  [facility] Urology Clinic Consultation / New Patient Visit    Patient:  Ceci Mosher  YOB: 1970  Date: 2/8/2021  Consult requested from MARILYN Espinoza CNP     HISTORY OF PRESENT ILLNESS:   The patient is a 46 y.o. male who presents today for follow-up for the following problem(s): BPH, bladder wall thickening  Overall the problem(s) : are worsening. Associated Symptoms: No dysuria, gross hematuria. Pain Severity:      Today visit:    2/8/21   Calli Yang presents with increasing difficulty emptying bladder. Calli Yang only presents after Urolift to help with his atonic bladder secondary to diabetic cystopath. Today bladder emptying improved, PVR: 60 ml.       9/14/20  Today we see Calli Yang after Urolift. He had a catheter placed post op for concerns of retention, which was removed without complication and he is voiding well. PVR is 79 ml today, and we are very happy to see he is having success emptying his bladder after the procedure. The patient is very happy. He denies hematuria and frequency and urgency of urination. 8/10/20  Pt has history of diabetic cystopathy with BPH and obstruction. He had a cystoscopy which showed a small obstructing gland, with severe bladder trabeculations, no obstructing median lobe. Flomax is not working, and he has elevated PVRs. He does not want a catheter. He is interested in Urolift. Risks benefits and alternative procedures are explained, informed consent is obtained, and the patient elects to proceed. 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 - Nocturia: 5-6 times  Worsening:  Yes  Duration: years  Pain:  none  Bladder scan:  295 ml  CT abd pelvis: report reviewed  - No gross  abnormalities.   Bladder reported normal.   Meds:  none   History:  none  PSA:  none    PMH: DM, MURALI  ELI:  None      Summary of old records: (Patient's old records, notes and chart reviewed and summarized above.)    Last several PSA's:  Lab Results   Component Value Date    PSA 0.78 03/24/2020       Last total testosterone:  No results found for: TESTOSTERONE    Urinalysis today:  Results for POC orders placed in visit on 02/08/21   POCT Urinalysis No Micro (Auto)   Result Value Ref Range    Glucose, Ur 500 (A) NEGATIVE mg/dl    Bilirubin Urine Negative     Ketones, Urine Negative NEGATIVE    Specific Gravity, Urine 1.025 1.002 - 1.030    Blood, UA POC Trace-intact NEGATIVE    pH, Urine 6.00 5.0 - 9.0    Protein, Urine Negative NEGATIVE mg/dl    Urobilinogen, Urine 0.20 0.0 - 1.0 eu/dl    Nitrite, Urine Negative NEGATIVE    Leukocyte Clumps, Urine Negative NEGATIVE    Color, Urine Yellow YELLOW-STRAW    Character, Urine Clear [id number]   poct post void residual   Result Value Ref Range    post void residual 67 ml         Last BUN and creatinine:  Lab Results   Component Value Date    BUN 10 12/26/2020     Lab Results   Component Value Date    CREATININE 0.84 12/26/2020       Imaging Reviewed during this Office Visit:   (results were independently reviewed by physician and radiology report verified)    PAST MEDICAL, FAMILY AND SOCIAL HISTORY:  Past Medical History:   Diagnosis Date    Bipolar 2 disorder ([facility]     previously followed with Dr. Rangel Mccormick and Tess Estrada in [facility] BPH (benign prostatic hyperplasia)     Diabetes mellitus type 2, uncontrolled ([facility]     HgbA1c on 4/2/2019 was 9.1.     Diabetic peripheral neuropathy ([facility]     Diabetic polyneuropathy ([facility]     Diabetic ulcer of right foot associated with type 2 diabetes mellitus ([facility] 12/10/2015    Essential hypertension     \"never been on b/p medication that I know of\"    GERD (gastroesophageal reflux disease)     Hammer toe of left foot     Heart murmur     denies any chest pain or palpitations    History of tobacco abuse     Hx of AKA (above knee amputation), right ([facility] 04/18/2019    Dr. Mac Gan edema, diabetic, bilateral ([facility] 05/04/2018    Dr. Paul Rico referred to retina specialist for 2nd opinion    Marijuana abuse in remission     Melena     MRSA (methicillin resistant staph aureus) culture positive     Onychomycosis     Other disorders of kidney and ureter in diseases classified elsewhere     Sleep apnea     no cpap    Thyroid disease     WD-Skin ulcer of fourth toe of right foot with necrosis of bone ([facility] 6/29/2016     Past Surgical History:   Procedure Laterality Date    ABSCESS DRAINAGE Right     foot    AMPUTATION ABOVE KNEE Right 4/18/2019    RIGHT ABOVE KNEE AMPUTATION performed by Katya Frost MD at [address], LAPAROSCOPIC N/A 4/1/2020    ROBOTIC CHOLECYSTECTOMY performed by Cherie Reynaga MD at [address] N/A 7/20/2020    CYSTOSCOPY performed by Dorcas Pickering MD at [address] 8/21/2020    Dietra Saber performed by Dorcas Pickering MD at [facility], DIAGNOSTIC      FOOT DEBRIDEMENT Right 07/01/2016    I & D    INCISION AND DRAINAGE Right 2/18/2019    I&D RIGHT STUMP performed by Katya Frost MD at [address] [facility],[address] [place] [address] Right 07/20/2016    LEG AMPUTATION BELOW KNEE Right 4/4/2019    I&D AND REVISION OF AMPUTATION RIGHT LEG performed by Katya Frost MD at [address] [facility] Right 1/14/15    sole of foot I&D    MS DRAIN INFECT SHOULDER BURSA Left 8/18/2017    LEFT SHOULDER INCISION AND DRAINAGE performed by Katya Frost MD at [address] [facility] OFFICE/OUTPT [facility] Right 9/20/2018    EXCISIONAL DEBRIDEMENT RIGHT BKA STUMP performed [facility]ainab Durham MD at [facility] Right 1/16/15    2nd toe with wound vac applied    UPPER GASTROINTESTINAL ENDOSCOPY N/A 3/3/2020    EGD DILATION SAVORY performed by Estanislao Kawasaki, MD at [facility]  ? when     [facility] History   Problem Relation Age of Onset    Diabetes Mother     Other Mother         pneumonia, H1N1    Depression Mother     Early Death Mother     High Blood Pressure Mother     High Cholesterol Mother     Vision Loss Maternal Grandmother     Arthritis Maternal Grandfather     Heart Disease Maternal Grandfather      Outpatient Medications Marked as Taking for the 2/8/21 encounter (Office Visit) with Zhao Hurt MD   Medication Sig Dispense Refill    blood glucose monitor kit and supplies Accu Chek Sheila meter. Use to test blood sugars DX:E11.65 1 kit 0    insulin lispro (HUMALOG KWIKPEN U-200) 200 UNIT/ML SOPN pen Inject 40 Units into the skin 3 times daily (with meals) Plus scale - Max dose 150 units per day 18 pen 1    lisinopril (PRINIVIL;ZESTRIL) 5 MG tablet Take 1 tablet by mouth daily 90 tablet 1    empagliflozin (JARDIANCE) 10 MG tablet Take 1 tablet by mouth daily 90 tablet 1    blood glucose monitor strips Test 4 times a day & as needed for symptoms of irregular blood glucose. Guide blood glucose test strips 400 strip 3    gabapentin (NEURONTIN) 400 MG capsule Take 1 capsule by mouth 2 times daily for 60 days.  (Patient taking differently: Take 400 mg by mouth 4 times daily. ) 60 capsule 2    ARIPiprazole (ABILIFY) 5 MG tablet Take 5 mg by mouth daily       fenofibrate (TRICOR) 145 MG tablet 145 mg      tamsulosin (FLOMAX) 0.4 MG capsule Take 1 capsule by mouth daily Take one capsule daily to facilitate passage of ureteral stone 30 capsule 3    Insulin Glargine, 2 Unit Dial, 300 UNIT/ML SOPN Inject 90 Units into the skin 2 times daily 15 pen 5    metoprolol tartrate (LOPRESSOR) 50 MG tablet Take 1.5 tablets by mouth 2 times daily 90 tablet 3    levothyroxine (SYNTHROID) 50 MCG tablet Take 1 tablet by mouth daily 30 tablet 5    sertraline (ZOLOFT) 100 MG tablet Take 1 tablet by mouth daily 30 tablet 2    pantoprazole (PROTONIX) 40 MG tablet Take 40 mg by mouth daily      blood glucose monitor strips Accucheck Sheila Test Strips Test blood sugars 4 times daily DX:E11.65 400 strip 5    Lancets MISC Use to test blood sugars 4 times daily DX:E11.65 400 each 5    Insulin Syringe-Needle U-100 29G X 1/2\" 0.3 ML MISC 1 each by Does not apply route 4 times daily (after meals and at bedtime) 200 each 0       Pcn [penicillins], Actos [pioglitazone], Clindamycin/lincomycin, and Vancomycin  Social History     Tobacco Use   Smoking Status Former Smoker    Packs/day: 1.00    Years: 10.00    Pack years: 10.00    Types: Cigars    Start date: 1985   Nicole Valdes Quit date:     Years since quittin.4   Smokeless Tobacco Never Used       Social History     Substance and Sexual Activity   Alcohol Use Not Currently    Alcohol/week: 0.0 standard drinks       REVIEW OF SYSTEMS:  Constitutional: negative  Eyes: negative  Respiratory: negative  Cardiovascular: negative  Gastrointestinal: negative  Musculoskeletal: negative  Genitourinary: negative  Skin: negative   Neurological: negative  Hematological/Lymphatic: negative  Psychological: negative    Physical Exam:    This a 46 y.o. male   Vitals:    21 1251   Temp: 96.6 °F (35.9 °C)     Constitutional: Patient in no acute distress   Neuro: alert and oriented to person place and time. Psych: Mood and affect normal.  Head: atraumatic normocephalic  Eyes: EOMi  HEENT: neck supple, trachea midline  Lungs: Respiratory effort normal  Cardiovascular:  Normal peripheral pulses  Abdomen: Soft, non-tender, non-distended, No CVA  Bladder: non-tender and not distended. FROMx4, no cyanosis clubbing edema  Skin: warm and dry      Assessment and Plan      1. BPH with urinary obstruction           Plan:      No follow-ups on file.   Continue flomax  Continue timed voiding  Credee maneuver    Return in 1 year

## 2021-02-22 ENCOUNTER — OFFICE VISIT (OUTPATIENT)
Dept: INTERNAL MEDICINE CLINIC | Age: 51
End: 2021-02-22
Payer: MEDICARE

## 2021-02-22 VITALS
SYSTOLIC BLOOD PRESSURE: 137 MMHG | WEIGHT: 194 LBS | DIASTOLIC BLOOD PRESSURE: 68 MMHG | HEIGHT: 66 IN | HEART RATE: 106 BPM | BODY MASS INDEX: 31.18 KG/M2 | TEMPERATURE: 97.2 F

## 2021-02-22 DIAGNOSIS — E11.65 UNCONTROLLED TYPE 2 DIABETES MELLITUS WITH HYPERGLYCEMIA, WITH LONG-TERM CURRENT USE OF INSULIN (HCC): Primary | Chronic | ICD-10-CM

## 2021-02-22 DIAGNOSIS — F33.2 SEVERE EPISODE OF RECURRENT MAJOR DEPRESSIVE DISORDER, WITHOUT PSYCHOTIC FEATURES (HCC): ICD-10-CM

## 2021-02-22 DIAGNOSIS — R74.8 ELEVATED LIPASE: ICD-10-CM

## 2021-02-22 DIAGNOSIS — M25.572 ACUTE LEFT ANKLE PAIN: ICD-10-CM

## 2021-02-22 DIAGNOSIS — G62.9 NEUROPATHY: ICD-10-CM

## 2021-02-22 DIAGNOSIS — Z79.4 UNCONTROLLED TYPE 2 DIABETES MELLITUS WITH HYPERGLYCEMIA, WITH LONG-TERM CURRENT USE OF INSULIN (HCC): Primary | Chronic | ICD-10-CM

## 2021-02-22 PROCEDURE — 3017F COLORECTAL CA SCREEN DOC REV: CPT | Performed by: NURSE PRACTITIONER

## 2021-02-22 PROCEDURE — 2022F DILAT RTA XM EVC RTNOPTHY: CPT | Performed by: NURSE PRACTITIONER

## 2021-02-22 PROCEDURE — G8427 DOCREV CUR MEDS BY ELIG CLIN: HCPCS | Performed by: NURSE PRACTITIONER

## 2021-02-22 PROCEDURE — G8417 CALC BMI ABV UP PARAM F/U: HCPCS | Performed by: NURSE PRACTITIONER

## 2021-02-22 PROCEDURE — 1036F TOBACCO NON-USER: CPT | Performed by: NURSE PRACTITIONER

## 2021-02-22 PROCEDURE — 99214 OFFICE O/P EST MOD 30 MIN: CPT | Performed by: NURSE PRACTITIONER

## 2021-02-22 PROCEDURE — 3046F HEMOGLOBIN A1C LEVEL >9.0%: CPT | Performed by: NURSE PRACTITIONER

## 2021-02-22 PROCEDURE — G8484 FLU IMMUNIZE NO ADMIN: HCPCS | Performed by: NURSE PRACTITIONER

## 2021-02-22 RX ORDER — SERTRALINE HYDROCHLORIDE 100 MG/1
100 TABLET, FILM COATED ORAL DAILY
Qty: 30 TABLET | Refills: 2 | Status: SHIPPED | OUTPATIENT
Start: 2021-02-22 | End: 2021-04-19 | Stop reason: SDUPTHER

## 2021-02-22 RX ORDER — FLASH GLUCOSE SCANNING READER
1 EACH MISCELLANEOUS
Qty: 1 DEVICE | Refills: 0 | Status: SHIPPED
Start: 2021-02-22 | End: 2021-04-06 | Stop reason: ALTCHOICE

## 2021-02-22 RX ORDER — GABAPENTIN 300 MG/1
600 CAPSULE ORAL DAILY
Qty: 60 CAPSULE | Refills: 0 | Status: SHIPPED | OUTPATIENT
Start: 2021-02-22 | End: 2021-03-17 | Stop reason: SDUPTHER

## 2021-02-22 RX ORDER — FLASH GLUCOSE SENSOR
1 KIT MISCELLANEOUS
Qty: 6 EACH | Refills: 2 | Status: SHIPPED
Start: 2021-02-22 | End: 2021-04-06 | Stop reason: ALTCHOICE

## 2021-02-22 NOTE — PROGRESS NOTES
Shayla Salcido  INTERNAL MEDICINE  [address] [facility] [address]  Dept: [phone]  Dept Fax: [phone]  Loc: [phone]     Visit Date:  2/22/2021    Patient:  Elo Boothe  YOB: 1970    HPI:     Chief Complaint   Patient presents with    1 Month Follow-Up    Diabetes     type 2        HPI    DM - A1C 10.3% 1/22/2021. States glucose 300's. States down from 500s. No meter today. 70 units twice daily. Lunch 35 units with meals. Highest this week 365, lowest 145. Fasting around 200's, with lowest 275. Premeal vary, based on dietary intake. Feels he misses mealtime insulin during the day. He reports getting two mealtime insulin doses in, and long acting every day. Denies polydipsia. Reports polyuria. Feels he is emptying his bladder all the way. Has seen Dr. Ward Carranza and sees him next year. Neuropathy/phantom limb - Gabapentin 400 mg QID. States this is making him \"high\" and he wants to reduce to once daily dosing. Rolled his ankle in the bathroom, heard a crack. Started [employer] Friday. [employer] personal training and nutrition. States he is constipated. No nausea, vomiting, diarrhea, abdominal pain. Medications    Current Outpatient Medications:     sertraline (ZOLOFT) 100 MG tablet, Take 1 tablet by mouth daily, Disp: 30 tablet, Rfl: 2    Continuous Blood Gluc Sensor (FREESTYLE MARIANELA 14 DAY SENSOR) MISC, 1 Device by Does not apply route 4 times daily (before meals and nightly) Change every 14 days, Disp: 6 each, Rfl: 2    Continuous Blood Gluc  (FREESTYLE MARIANELA 14 DAY READER) LIANNE, 1 Device by Does not apply route 4 times daily (before meals and nightly), Disp: 1 Device, Rfl: 0    gabapentin (NEURONTIN) 300 MG capsule, Take 2 capsules by mouth daily for 30 days. , Disp: 60 capsule, Rfl: 0    blood glucose monitor kit and supplies, Accu Chek Sheila meter.  Use to test blood sugars DX:E11.65, Disp: 1 kit, Rfl: 0    insulin lispro (HUMALOG KWIKPEN U-200) 200 UNIT/ML SOPN pen, Inject 40 Units into the skin 3 times daily (with meals) Plus scale - Max dose 150 units per day, Disp: 18 pen, Rfl: 1    lisinopril (PRINIVIL;ZESTRIL) 5 MG tablet, Take 1 tablet by mouth daily, Disp: 90 tablet, Rfl: 1    empagliflozin (JARDIANCE) 10 MG tablet, Take 1 tablet by mouth daily, Disp: 90 tablet, Rfl: 1    blood glucose monitor strips, Test 4 times a day & as needed for symptoms of irregular blood glucose. Guide blood glucose test strips, Disp: 400 strip, Rfl: 3    ARIPiprazole (ABILIFY) 5 MG tablet, Take 5 mg by mouth daily , Disp: , Rfl:     fenofibrate (TRICOR) 145 MG tablet, 145 mg, Disp: , Rfl:     tamsulosin (FLOMAX) 0.4 MG capsule, Take 1 capsule by mouth daily Take one capsule daily to facilitate passage of ureteral stone, Disp: 30 capsule, Rfl: 3    Insulin Glargine, 2 Unit Dial, 300 UNIT/ML SOPN, Inject 90 Units into the skin 2 times daily, Disp: 15 pen, Rfl: 5    metoprolol tartrate (LOPRESSOR) 50 MG tablet, Take 1.5 tablets by mouth 2 times daily, Disp: 90 tablet, Rfl: 3    levothyroxine (SYNTHROID) 50 MCG tablet, Take 1 tablet by mouth daily, Disp: 30 tablet, Rfl: 5    pantoprazole (PROTONIX) 40 MG tablet, Take 40 mg by mouth daily, Disp: , Rfl:     blood glucose monitor strips, Accucheck Sheila Test Strips Test blood sugars 4 times daily DX:E11.65, Disp: 400 strip, Rfl: 5    Lancets MISC, Use to test blood sugars 4 times daily DX:E11.65, Disp: 400 each, Rfl: 5    Insulin Syringe-Needle U-100 29G X 1/2\" 0.3 ML MISC, 1 each by Does not apply route 4 times daily (after meals and at bedtime), Disp: 200 each, Rfl: 0    vitamin D (ERGOCALCIFEROL) 1.25 MG (71157 UT) CAPS capsule, Take 1 capsule by mouth once a week Take for 8 weeks. , Disp: 8 capsule, Rfl: 0    vitamin D (CHOLECALCIFEROL) 50 MCG (2000 UT) TABS tablet, Take 1 tablet by mouth daily To start AFTER the 8 weeks of 50,000 IU weekly. , Disp: 30 tablet, Rfl: 1    The patient is allergic to pcn [penicillins]; actos [pioglitazone]; clindamycin/lincomycin; and vancomycin. Past Medical History  Meryl Wong  has a past medical history of Bipolar 2 disorder ([address] BPH (benign prostatic hyperplasia), Diabetes mellitus type 2, uncontrolled ([address] Diabetic peripheral neuropathy ([address] Diabetic polyneuropathy ([address] Diabetic ulcer of right foot associated with type 2 diabetes mellitus ([address] Essential hypertension, GERD (gastroesophageal reflux disease), Hammer toe of left foot, Heart murmur, History of tobacco abuse, Hx of AKA (above knee amputation), right ([address] Macular edema, diabetic, bilateral ([address] Marijuana abuse in remission, Melena, MRSA (methicillin resistant staph aureus) culture positive, Onychomycosis, Other disorders of kidney and ureter in diseases classified elsewhere, Sleep apnea, Thyroid disease, and 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 Past Surgical History  The patient  has a past surgical history that includes other surgical history (Right, 1/14/15); Abscess Drainage (Right); Toe amputation (Right, 1/16/15); Foot Debridement (Right, 07/01/2016); Leg amputation below knee (Right, 07/20/2016); Arboles tooth extraction (?when); pr drain infect shoulder bursa (Left, 8/18/2017); pr office/outpt visit,procedure only (Right, 9/20/2018); incision and drainage (Right, 2/18/2019); Leg amputation below knee (Right, 4/4/2019); AMPUTATION ABOVE KNEE (Right, 4/18/2019); Upper gastrointestinal endoscopy (N/A, 3/3/2020); Cholecystectomy, laparoscopic (N/A, 4/1/2020); Endoscopy, colon, diagnostic; Cystoscopy (N/A, 7/20/2020); and Cystoscopy (N/A, 8/21/2020).     Family History  This patient's family history includes Arthritis in his maternal grandfather; Depression in his mother; Diabetes in his mother; Early Death in his mother; Heart Disease in his maternal grandfather; High Blood Pressure in his mother; High Cholesterol in his mother; Other in his mother; Vision Loss in his maternal grandmother. Social History  Iggy Jackson  reports that he quit smoking about 20 years ago. His smoking use included cigars. He started smoking about 36 years ago. He has a 10.00 pack-year smoking history. He has never used smokeless tobacco. He reports previous alcohol use. He reports that he does not use drugs. Health Maintenance:    Health Maintenance   Topic Date Due    Hepatitis B vaccine (1 of 3 - Risk 3-dose series) Never done    DTaP/Tdap/Td vaccine (1 - Tdap) Never done    Shingles Vaccine (1 of 2) Never done    Colon cancer screen colonoscopy  Never done    Diabetic retinal exam  05/09/2019    Annual Wellness Visit (AWV)  Never done    Diabetic microalbuminuria test  10/10/2019    Flu vaccine (1) 01/22/2022 (Originally 9/1/2020)    A1C test (Diabetic or Prediabetic)  04/22/2021    Diabetic foot exam  01/22/2022    Lipid screen  02/26/2022    TSH testing  02/26/2022    Potassium monitoring  02/26/2022    Creatinine monitoring  02/26/2022    Pneumococcal 0-64 years Vaccine  Completed    COVID-19 Vaccine  Completed    Hepatitis C screen  Completed    HIV screen  Completed    Hepatitis A vaccine  Aged Out    Hib vaccine  Aged Out    Meningococcal (ACWY) vaccine  Aged Out       Subjective:      Review of Systems   Constitutional: Negative for chills, fatigue and fever. HENT: Negative for sore throat and trouble swallowing. Eyes: Negative for visual disturbance. Respiratory: Negative for cough and shortness of breath. Cardiovascular: Negative for chest pain and leg swelling. Gastrointestinal: Negative for abdominal pain, constipation, diarrhea, nausea and vomiting. Genitourinary: Negative for difficulty urinating. Musculoskeletal: Negative for arthralgias and myalgias. Right AKA   Skin: Negative for rash and wound. Neurological: Negative for numbness.        Objective:     /68 (Site: Right Upper Arm, Position: Sitting, Cuff Size: Medium Adult)   Pulse 106   Temp 97.2 °F (36.2 °C)   Ht 5' 6\" (1.676 m)   Wt 194 lb (88 kg)   BMI 31.31 kg/m²     Physical Exam  Vitals signs reviewed. Constitutional:       General: He is not in acute distress. Appearance: He is well-developed. He is not diaphoretic. HENT:      Head: Normocephalic and atraumatic. Mouth/Throat:      Pharynx: No oropharyngeal exudate. Neck:      Musculoskeletal: Normal range of motion and neck supple. Thyroid: No thyromegaly. Cardiovascular:      Rate and Rhythm: Normal rate and regular rhythm. Heart sounds: Normal heart sounds. No murmur. No friction rub. No gallop. Pulmonary:      Effort: Pulmonary effort is normal. No respiratory distress. Breath sounds: Normal breath sounds. No wheezing or rales. Abdominal:      General: There is no distension. Palpations: Abdomen is soft. Tenderness: There is no abdominal tenderness. Musculoskeletal: Normal range of motion. General: No tenderness. Comments: S/P Rt AKA   Lymphadenopathy:      Cervical: No cervical adenopathy. Skin:     General: Skin is warm and dry. Coloration: Skin is not pale. Findings: No erythema or rash. Comments: Left shin with scabbed wound noted, no drainage, or erythema noted. Neurological:      Mental Status: He is alert and oriented to person, place, and time.          Labs Reviewed 2/22/2021:    Lab Results   Component Value Date    WBC 10.4 02/26/2021    HGB 15.9 02/26/2021    HCT 46.6 02/26/2021     02/26/2021    CHOL 161 10/10/2018    TRIG 235 (H) 02/26/2021    HDL 27 10/08/2020    LDLDIRECT 102.12 02/26/2021    ALT 26 02/26/2021    AST 34 02/26/2021     (L) 02/26/2021    K 4.6 02/26/2021    CL 97 (L) 02/26/2021    CREATININE 0.8 02/26/2021    BUN 13 02/26/2021    CO2 26 02/26/2021    TSH 3.450 02/26/2021    PSA 0.78 03/24/2020    INR 1.04 12/26/2020    GLUF 341 (H) 10/08/2020    LABA1C 10.3 (H) 01/22/2021    LABMICR 1.21 10/10/2018       Assessment/Plan      1. Uncontrolled type 2 diabetes mellitus with hyperglycemia, with long-term current use of insulin ([facility])  A1c up to 10.3%. Many discussions with this patient concerning lifestyle management, reports he is taking his insulins as advised. Increase long acting insulin to 75 units twice daily  Increase mealtime insulin to 37 units. glucose logs on Thursday for further adjustment  Recheck labs. Schedule dilated eye exam.  See diabetic educator and dietitian. Check with insurance about endocrinology for referral.  We will check on referral from RNs, patient states he has never heard from Endo. - Comprehensive Metabolic Panel; Future  - Continuous Blood Gluc Sensor (FREESTYLE MARIANELA 14 DAY SENSOR) MISC; 1 Device by Does not apply route 4 times daily (before meals and nightly) Change every 14 days  Dispense: 6 each; Refill: 2  - Continuous Blood Gluc  (FREESTYLE MARIANELA 14 DAY READER) ILANNE; 1 Device by Does not apply route 4 times daily (before meals and nightly)  Dispense: 1 Device; Refill: 0  - gabapentin (NEURONTIN) 300 MG capsule; Take 2 capsules by mouth daily for 30 days. Dispense: 60 capsule; Refill: 0    2. Acute left ankle pain  Severe neuropathy, rolled his ankle and heard a pop. He has full movement today, no swelling or abnormalities noted. Will x-ray. - XR FOOT LEFT (MIN 3 VIEWS); Future    3. Elevated lipase  He was requesting recheck on his lipase. No abdominal pain. I advised him that this is likely a chronic issue partially related to his sugars,  - Lipase; Future    4. Neuropathy  Patient prefers not to take multiple daily dosing of gabapentin, he states 400 mg 3 times daily makes him feel euphoric. He reports he would like to trial once daily dosing, rather than 400 mg twice a day. This is half of his prior total daily dosing,  - gabapentin (NEURONTIN) 300 MG capsule; Take 2 capsules by mouth daily for 30 days. Dispense: 60 capsule; Refill: 0    5. Severe episode of recurrent major depressive disorder, without psychotic features ([facility]  Reports mood is controlled. Denies suicidal ideation. - sertraline (ZOLOFT) 100 MG tablet; Take 1 tablet by mouth daily  Dispense: 30 tablet; Refill: 2    UPDATE 3/16/2021: He is having issues with his prosthesis, out of fit due to volume loss - and needs an order sent to [facility] for replacement of the right above knee socket to remedy this. Return in about 4 weeks (around 3/22/2021) for Diabetes. Patient given educational materials - see patient instructions. Discussed use, benefit, and side effects of prescribed medications. All patient questions answered. Pt voiced understanding.          Electronically signed MARILYN Chavez CNP on 2/22/21 at 8:52 AM EST

## 2021-02-22 NOTE — PATIENT INSTRUCTIONS
Increase long acting insulin to 75 units twice daily  Increase mealtime insulin to 37 units.    Get me glucose results on Thursday so I can adjust

## 2021-02-26 ENCOUNTER — HOSPITAL ENCOUNTER (OUTPATIENT)
Dept: GENERAL RADIOLOGY | Age: 51
Discharge: HOME | End: 2021-02-26
Payer: MEDICARE

## 2021-02-26 ENCOUNTER — HOSPITAL ENCOUNTER (OUTPATIENT)
Dept: WOUND CARE | Age: 51
Discharge: HOME | End: 2021-02-26
Payer: MEDICARE

## 2021-02-26 ENCOUNTER — HOSPITAL ENCOUNTER (OUTPATIENT)
Age: 51
Discharge: HOME | End: 2021-02-26
Payer: MEDICARE

## 2021-02-26 VITALS
OXYGEN SATURATION: 99 % | DIASTOLIC BLOOD PRESSURE: 74 MMHG | HEART RATE: 104 BPM | RESPIRATION RATE: 16 BRPM | TEMPERATURE: 97.9 F | SYSTOLIC BLOOD PRESSURE: 138 MMHG

## 2021-02-26 DIAGNOSIS — R74.8 ELEVATED LIPASE: ICD-10-CM

## 2021-02-26 DIAGNOSIS — E78.5 HYPERLIPIDEMIA LDL GOAL <70: ICD-10-CM

## 2021-02-26 DIAGNOSIS — I10 ESSENTIAL HYPERTENSION: ICD-10-CM

## 2021-02-26 DIAGNOSIS — E55.9 VITAMIN D DEFICIENCY: ICD-10-CM

## 2021-02-26 DIAGNOSIS — Z79.4 UNCONTROLLED TYPE 2 DIABETES MELLITUS WITH HYPERGLYCEMIA, WITH LONG-TERM CURRENT USE OF INSULIN (HCC): Chronic | ICD-10-CM

## 2021-02-26 DIAGNOSIS — G54.6 PAIN, PHANTOM LIMB (HCC): ICD-10-CM

## 2021-02-26 DIAGNOSIS — M25.572 ACUTE LEFT ANKLE PAIN: ICD-10-CM

## 2021-02-26 DIAGNOSIS — E11.65 UNCONTROLLED TYPE 2 DIABETES MELLITUS WITH HYPERGLYCEMIA, WITH LONG-TERM CURRENT USE OF INSULIN (HCC): Chronic | ICD-10-CM

## 2021-02-26 DIAGNOSIS — G62.9 NEUROPATHY: ICD-10-CM

## 2021-02-26 DIAGNOSIS — R25.2 CRAMP IN LOWER LEG: ICD-10-CM

## 2021-02-26 DIAGNOSIS — E03.9 HYPOTHYROIDISM, UNSPECIFIED TYPE: ICD-10-CM

## 2021-02-26 PROBLEM — S81.802D TRAUMATIC OPEN WOUND OF LEFT LOWER LEG WITH DELAYED HEALING: Status: RESOLVED | Noted: 2020-09-18 | Resolved: 2021-02-26

## 2021-02-26 LAB
ALBUMIN SERPL-MCNC: 4.1 G/DL (ref 3.5–5.1)
ALP BLD-CCNC: 71 U/L (ref 38–126)
ALT SERPL-CCNC: 26 U/L (ref 11–66)
ANION GAP SERPL CALCULATED.3IONS-SCNC: 11 MEQ/L (ref 8–16)
AST SERPL-CCNC: 34 U/L (ref 5–40)
BASOPHILS # BLD: 0.8 %
BASOPHILS ABSOLUTE: 0.1 THOU/MM3 (ref 0–0.1)
BILIRUB SERPL-MCNC: 0.4 MG/DL (ref 0.3–1.2)
BUN BLDV-MCNC: 13 MG/DL (ref 7–22)
CALCIUM SERPL-MCNC: 9.8 MG/DL (ref 8.5–10.5)
CHLORIDE BLD-SCNC: 97 MEQ/L (ref 98–111)
CO2: 26 MEQ/L (ref 23–33)
CREAT SERPL-MCNC: 0.8 MG/DL (ref 0.4–1.2)
EOSINOPHIL # BLD: 3.5 %
EOSINOPHILS ABSOLUTE: 0.4 THOU/MM3 (ref 0–0.4)
ERYTHROCYTE [DISTWIDTH] IN BLOOD BY AUTOMATED COUNT: 14.2 % (ref 11.5–14.5)
ERYTHROCYTE [DISTWIDTH] IN BLOOD BY AUTOMATED COUNT: 46.8 FL (ref 35–45)
FOLATE: > 20 NG/ML (ref 4.8–24.2)
GFR SERPL CREATININE-BSD FRML MDRD: > 90 ML/MIN/1.73M2
GLUCOSE BLD-MCNC: 310 MG/DL (ref 70–108)
HCT VFR BLD CALC: 46.6 % (ref 42–52)
HEMOGLOBIN: 15.9 GM/DL (ref 14–18)
IMMATURE GRANS (ABS): 0.04 THOU/MM3 (ref 0–0.07)
IMMATURE GRANULOCYTES: 0.4 %
LDL CHOLESTEROL DIRECT: 102.12 MG/DL
LIPASE: 130.7 U/L (ref 5.6–51.3)
LYMPHOCYTES # BLD: 19.8 %
LYMPHOCYTES ABSOLUTE: 2.1 THOU/MM3 (ref 1–4.8)
MAGNESIUM: 2.3 MG/DL (ref 1.6–2.4)
MCH RBC QN AUTO: 31 PG (ref 26–33)
MCHC RBC AUTO-ENTMCNC: 34.1 GM/DL (ref 32.2–35.5)
MCV RBC AUTO: 90.8 FL (ref 80–94)
MONOCYTES # BLD: 7.6 %
MONOCYTES ABSOLUTE: 0.8 THOU/MM3 (ref 0.4–1.3)
NUCLEATED RED BLOOD CELLS: 0 /100 WBC
PLATELET # BLD: 232 THOU/MM3 (ref 130–400)
PMV BLD AUTO: 9.5 FL (ref 9.4–12.4)
POTASSIUM SERPL-SCNC: 4.6 MEQ/L (ref 3.5–5.2)
RBC # BLD: 5.13 MILL/MM3 (ref 4.7–6.1)
SEG NEUTROPHILS: 67.9 %
SEGMENTED NEUTROPHILS ABSOLUTE COUNT: 7.1 THOU/MM3 (ref 1.8–7.7)
SODIUM BLD-SCNC: 134 MEQ/L (ref 135–145)
TOTAL PROTEIN: 8.6 G/DL (ref 6.1–8)
TRIGL SERPL-MCNC: 235 MG/DL (ref 0–199)
TSH SERPL DL<=0.05 MIU/L-ACNC: 3.45 UIU/ML (ref 0.4–4.2)
VITAMIN B-12: 1561 PG/ML (ref 211–911)
VITAMIN D 25-HYDROXY: 19 NG/ML (ref 30–100)
WBC # BLD: 10.4 THOU/MM3 (ref 4.8–10.8)

## 2021-02-26 PROCEDURE — 36415 COLL VENOUS BLD VENIPUNCTURE: CPT

## 2021-02-26 PROCEDURE — 83721 ASSAY OF BLOOD LIPOPROTEIN: CPT

## 2021-02-26 PROCEDURE — 73630 X-RAY EXAM OF FOOT: CPT

## 2021-02-26 PROCEDURE — 83735 ASSAY OF MAGNESIUM: CPT

## 2021-02-26 PROCEDURE — 82306 VITAMIN D 25 HYDROXY: CPT

## 2021-02-26 PROCEDURE — 82607 VITAMIN B-12: CPT

## 2021-02-26 PROCEDURE — 99212 OFFICE O/P EST SF 10 MIN: CPT | Performed by: NURSE PRACTITIONER

## 2021-02-26 PROCEDURE — 82746 ASSAY OF FOLIC ACID SERUM: CPT

## 2021-02-26 PROCEDURE — 80053 COMPREHEN METABOLIC PANEL: CPT

## 2021-02-26 PROCEDURE — 85025 COMPLETE CBC W/AUTO DIFF WBC: CPT

## 2021-02-26 PROCEDURE — 84443 ASSAY THYROID STIM HORMONE: CPT

## 2021-02-26 PROCEDURE — 84478 ASSAY OF TRIGLYCERIDES: CPT

## 2021-02-26 PROCEDURE — 83690 ASSAY OF LIPASE: CPT

## 2021-02-26 PROCEDURE — 99212 OFFICE O/P EST SF 10 MIN: CPT

## 2021-02-26 ASSESSMENT — PAIN SCALES - GENERAL: PAINLEVEL_OUTOF10: 8

## 2021-02-26 ASSESSMENT — PAIN DESCRIPTION - PAIN TYPE: TYPE: CHRONIC PAIN

## 2021-02-26 NOTE — PLAN OF CARE
Problem: Wound:  Goal: Will show signs of wound healing; wound closure and no evidence of infection  Description: Will show signs of wound healing; wound closure and no evidence of infection  2/26/2021 1018 by Vernelle Sicard, RN  Outcome: Met This Shift     Patient presents to wound clinic for left leg wound. No s/s of infection noted. See AVS for discharge instructions. Follow up visit: As needed. Call with any concerns    Care plan reviewed with patient. Patient verbalize understanding of the plan of care and contribute to goal setting.

## 2021-02-26 NOTE — PROGRESS NOTES
[address] [place] [address] and Procedure Note      [address] [facility] RECORD NUMBER:  [id number]  AGE: 46 y.o. GENDER: male  : 1970  EPISODE DATE:  2021    SUBJECTIVE:     Chief Complaint   Patient presents with    Wound Check     left leg         HISTORY OF PRESENT ILLNESS      Jovon Hester is a 46 y.o. male who presents today for evaluation of left leg wounds. Luis Billingsley states that he has been bumping his leg into objects frequently recently creating wounds. He also reports recent fall, was evaluated by PCP who ordered xray of affected area. Luis Billingsley was last evaluated in this clinic 2020 for similar wounds. He denies any drainage from leg currently. States that he has diabetic lotion that he applies to leg. Does not use compression consistently. No further needs or concerns identified. PAST MEDICAL HISTORY             Diagnosis Date    Bipolar 2 disorder [facility])     previously followed with Dr. Kymberly Diamond and Berkley Warren in [facility] BPH (benign prostatic hyperplasia)     Diabetes mellitus type 2, uncontrolled ([address]     HgbA1c on 2019 was 9.1.     Diabetic peripheral neuropathy (HCC)     Diabetic polyneuropathy ([address]     Diabetic ulcer of right foot associated with type 2 diabetes mellitus ([address] 12/10/2015    Essential hypertension     \"never been on b/p medication that I know of\"    GERD (gastroesophageal reflux disease)     Hammer toe of left foot     Heart murmur     denies any chest pain or palpitations    History of tobacco abuse     Hx of AKA (above knee amputation), right ([address] 2019    Dr. Viral Martin edema, diabetic, bilateral ([address] 2018    Dr. 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 N/A 7/20/2020    CYSTOSCOPY performed by Chandu Swartz MD at [address] [facility] 8/21/2020    CYSTOSCOPY, UROLIFT performed by Chandu Swartz MD at [address] [facility], DIAGNOSTIC      FOOT DEBRIDEMENT Right 07/01/2016    I & D    INCISION AND DRAINAGE Right 2/18/2019    I&D RIGHT STUMP performed by Su Brito MD at [address] [facility] Right 07/20/2016    LEG AMPUTATION BELOW KNEE Right 4/4/2019    I&D AND REVISION OF AMPUTATION RIGHT LEG performed by Su Brito MD at [address] Right 1/14/15    sole of foot I&D    CO DRAIN INFECT SHOULDER BURSA Left 8/18/2017    LEFT SHOULDER INCISION AND DRAINAGE performed by Su Brito MD at [address] [facility] OFFICE/OUTPT [address] Right 9/20/2018    EXCISIONAL DEBRIDEMENT RIGHT BKA STUMP performed by Dorene Tran MD at [place] [address] Right 1/16/15    2nd toe with wound vac applied    UPPER GASTROINTESTINAL ENDOSCOPY N/A 3/3/2020    EGD DILATION SAVORY performed by Db Aranda MD at [facility]  ? when       FAMILY HISTORY     Family History   Problem Relation Age of Onset    Diabetes Mother     Other Mother         pneumonia, H1N1    Depression Mother     Early Death Mother     High Blood Pressure Mother     High Cholesterol Mother     Vision Loss Maternal Grandmother     Arthritis Maternal Grandfather     Heart Disease Maternal Grandfather SOCIAL HISTORY     Social History     Tobacco Use    Smoking status: Former Smoker     Packs/day: 1.00     Years: 10.00     Pack years: 10.00     Types: Cigars     Start date: 1985     Quit date:      Years since quittin.5    Smokeless tobacco: Never Used   Substance Use Topics    Alcohol use: Not Currently     Alcohol/week: 0.0 standard drinks    Drug use: No     Comment: 30 YEARS AGO       ALLERGIES     Allergies   Allergen Reactions    Pcn [Penicillins] Shortness Of Breath, Nausea And Vomiting and Other (See Comments)     Does not remember reactions. Has TOLERATED amoxicillin and several different cephalosporins.  Actos [Pioglitazone] Swelling    Clindamycin/Lincomycin Itching    Vancomycin Hives      Rash, with erythematous plaques to abdomen, shoulders, and proximal upper extremities with pruritis, persisted with 2nd dose administered slower. MEDICATIONS     Current Outpatient Medications on File Prior to Encounter   Medication Sig Dispense Refill    sertraline (ZOLOFT) 100 MG tablet Take 1 tablet by mouth daily 30 tablet 2    Continuous Blood Gluc Sensor ([employer]YLE MARIANELA 14 DAY SENSOR) MISC 1 Device by Does not apply route 4 times daily (before meals and nightly) Change every 14 days 6 each 2    Continuous Blood Gluc  (FREESTYLE MARIANELA 14 DAY READER) LIANNE 1 Device by Does not apply route 4 times daily (before meals and nightly) 1 Device 0    gabapentin (NEURONTIN) 300 MG capsule Take 2 capsules by mouth daily for 30 days. 60 capsule 0    blood glucose monitor kit and supplies Accu Chek Sheila meter.  Use to test blood sugars DX:E11.65 1 kit 0    insulin lispro (HUMALOG KWIKPEN U-200) 200 UNIT/ML SOPN pen Inject 40 Units into the skin 3 times daily (with meals) Plus scale - Max dose 150 units per day 18 pen 1    lisinopril (PRINIVIL;ZESTRIL) 5 MG tablet Take 1 tablet by mouth daily 90 tablet 1    empagliflozin (JARDIANCE) 10 MG tablet Take 1 tablet by mouth daily 90 tablet 1    blood glucose monitor strips Test 4 times a day & as needed for symptoms of irregular blood glucose. Guide blood glucose test strips 400 strip 3    ARIPiprazole (ABILIFY) 5 MG tablet Take 5 mg by mouth daily       fenofibrate (TRICOR) 145 MG tablet 145 mg      tamsulosin (FLOMAX) 0.4 MG capsule Take 1 capsule by mouth daily Take one capsule daily to facilitate passage of ureteral stone 30 capsule 3    Insulin Glargine, 2 Unit Dial, 300 UNIT/ML SOPN Inject 90 Units into the skin 2 times daily 15 pen 5    metoprolol tartrate (LOPRESSOR) 50 MG tablet Take 1.5 tablets by mouth 2 times daily 90 tablet 3    levothyroxine (SYNTHROID) 50 MCG tablet Take 1 tablet by mouth daily 30 tablet 5    pantoprazole (PROTONIX) 40 MG tablet Take 40 mg by mouth daily      blood glucose monitor strips Accucheck Sheila Test Strips Test blood sugars 4 times daily DX:E11.65 400 strip 5    Lancets MISC Use to test blood sugars 4 times daily DX:E11.65 400 each 5    Insulin Syringe-Needle U-100 29G X 1/2\" 0.3 ML MISC 1 each by Does not apply route 4 times daily (after meals and at bedtime) 200 each 0     No current facility-administered medications on file prior to encounter.         REVIEW OF SYSTEMS:     Constitutional: Denies fever, chills, night sweats, fatigue, weight loss/gain, loss of appetite   Head: Denies headache,  dizziness, loss of consciousness  Eye: Denies visual changes  Ears: Denies hearing loss, tinnitus  Mouth/throat: Denies ulceration, dental caries , dysphagia  Respiratory: Denies shortness of breath, cough, wheezing, dyspnea with exertion  Cardiovascular:Denies chest pain, palpitations  Gastrointestinal: Denies nausea, vomiting, constipation, diarrhea, abdominal pain   ISMAEL: Denies dysuria, frequency, urgency, hematuria  Musculoskeletal: +LLE edema Denies joint pain, stiffness,  Endocrine: Denies polyuria, polydipsia, cold or heat intolerance  Hematology: Denies easy brusing or bleeding, hx of hypertension I10    Tobacco dependence F17.200    Gastroesophageal reflux disease without esophagitis K21.9    History of marijuana use Z87.898    Physical debility R53.81    Anemia D64.9    Electrolyte imbalance E87.8    Type 2 diabetes mellitus with hyperglycemia, with long-term current use of insulin ([facility]) E11.65, Z79.4    Weakness R53.1    Infection of above knee amputation stump of right leg ([facility]) T87.43    Above knee amputation of right lower extremity ([facility]) R56.731F    Sepsis ([facility]) A41.9    Vitamin D deficiency E55.9    COPD exacerbation ([facility]) J44.1    Influenza B J10.1    Depression, recurrent ([facility]) F33.9    Major depressive disorder, recurrent ([facility]) F33.9    GI bleed K92.2    Elevated lipase R74.8    Other psychoactive substance abuse, uncomplicated ([facility]) P54.37    Calculus of gallbladder without cholecystitis without obstruction K80.20    Right upper quadrant abdominal pain R10.11    Pedal edema R60.0    MURALI (obstructive sleep apnea) G47.33    Shortness of breath R06.02    Hypothyroid E03.9    Anxiety and depression F41.9, F32.9    Dyspnea R06.00    Sinus tachycardia L48.0    Metabolic acidosis B50.7    Traumatic open wound of left lower leg with delayed healing S81.802D    Edema of left lower extremity R60.0    SOB (shortness of breath) on exertion R06.02    Intermittent palpitations R00.2    History of chest pain Z87.898    Dizziness, nonspecific R42    Hyperlipidemia LDL goal <70 E78.5    Neuropathy G62.9       PLAN     Patient examined and evaluated. All available lab work, radiology studies, and progress notes pertaining to Carmen President reviewed prior to or during patient visit today. -LLE edema- No wounds present on evaluation today. Education provided on importance of control of edema through compression and elevation of extremity in wound prevention. Apply compression in the morning, remove prior to bed.   Continue lotion to leg daily to help keep skin

## 2021-03-03 DIAGNOSIS — E55.9 VITAMIN D DEFICIENCY: Primary | ICD-10-CM

## 2021-03-03 DIAGNOSIS — Z79.4 TYPE 2 DIABETES MELLITUS WITH HYPERGLYCEMIA, WITH LONG-TERM CURRENT USE OF INSULIN (HCC): ICD-10-CM

## 2021-03-03 DIAGNOSIS — E11.65 TYPE 2 DIABETES MELLITUS WITH HYPERGLYCEMIA, WITH LONG-TERM CURRENT USE OF INSULIN (HCC): ICD-10-CM

## 2021-03-03 RX ORDER — EMPAGLIFLOZIN 25 MG/1
1 TABLET, FILM COATED ORAL DAILY
Qty: 30 TABLET | Refills: 1 | OUTPATIENT
Start: 2021-03-03

## 2021-03-03 RX ORDER — ERGOCALCIFEROL 1.25 MG/1
50000 CAPSULE ORAL WEEKLY
Qty: 8 CAPSULE | Refills: 0 | Status: ON HOLD | OUTPATIENT
Start: 2021-03-03 | End: 2021-07-28

## 2021-03-03 RX ORDER — CHOLECALCIFEROL (VITAMIN D3) 50 MCG
2000 TABLET ORAL DAILY
Qty: 30 TABLET | Refills: 1 | Status: SHIPPED | OUTPATIENT
Start: 2021-03-03 | End: 2021-04-06 | Stop reason: SDUPTHER

## 2021-03-09 ASSESSMENT — ENCOUNTER SYMPTOMS
DIARRHEA: 0
VOMITING: 0
NAUSEA: 0
CONSTIPATION: 0
SORE THROAT: 0
ABDOMINAL PAIN: 0
COUGH: 0
SHORTNESS OF BREATH: 0
TROUBLE SWALLOWING: 0

## 2021-03-11 ENCOUNTER — TELEPHONE (OUTPATIENT)
Dept: INTERNAL MEDICINE CLINIC | Age: 51
End: 2021-03-11

## 2021-03-11 NOTE — TELEPHONE ENCOUNTER
Pt left a message asking for a script for 1 right above the knee replacement socket and asked that it be faxed to [phone]    He says the number on it is caroline [id number] or [id number] OK to print a script for this?

## 2021-03-16 DIAGNOSIS — Z89.611 AMPUTEE, ABOVE KNEE, RIGHT (HCC): Primary | ICD-10-CM

## 2021-03-17 DIAGNOSIS — S78.111A ABOVE KNEE AMPUTATION OF RIGHT LOWER EXTREMITY (HCC): ICD-10-CM

## 2021-03-17 DIAGNOSIS — E11.65 UNCONTROLLED TYPE 2 DIABETES MELLITUS WITH HYPERGLYCEMIA, WITH LONG-TERM CURRENT USE OF INSULIN (HCC): Chronic | ICD-10-CM

## 2021-03-17 DIAGNOSIS — I10 ESSENTIAL HYPERTENSION: ICD-10-CM

## 2021-03-17 DIAGNOSIS — E03.9 HYPOTHYROIDISM, UNSPECIFIED TYPE: ICD-10-CM

## 2021-03-17 DIAGNOSIS — G62.9 NEUROPATHY: ICD-10-CM

## 2021-03-17 DIAGNOSIS — Z79.4 UNCONTROLLED TYPE 2 DIABETES MELLITUS WITH HYPERGLYCEMIA, WITH LONG-TERM CURRENT USE OF INSULIN (HCC): Chronic | ICD-10-CM

## 2021-03-17 RX ORDER — LEVOTHYROXINE SODIUM 0.05 MG/1
50 TABLET ORAL DAILY
Qty: 30 TABLET | Refills: 5 | Status: SHIPPED | OUTPATIENT
Start: 2021-03-17 | End: 2021-04-19 | Stop reason: SDUPTHER

## 2021-03-17 RX ORDER — LISINOPRIL 5 MG/1
5 TABLET ORAL DAILY
Qty: 90 TABLET | Refills: 1 | Status: SHIPPED | OUTPATIENT
Start: 2021-03-17 | End: 2022-02-18 | Stop reason: SDUPTHER

## 2021-03-17 RX ORDER — GABAPENTIN 300 MG/1
600 CAPSULE ORAL DAILY
Qty: 60 CAPSULE | Refills: 0 | Status: SHIPPED | OUTPATIENT
Start: 2021-03-17 | End: 2021-03-22 | Stop reason: SDUPTHER

## 2021-03-17 RX ORDER — EMPAGLIFLOZIN 10 MG/1
1 TABLET, FILM COATED ORAL DAILY
Qty: 90 TABLET | Refills: 1 | Status: SHIPPED | OUTPATIENT
Start: 2021-03-17 | End: 2021-08-06

## 2021-03-22 ENCOUNTER — TELEPHONE (OUTPATIENT)
Dept: INTERNAL MEDICINE CLINIC | Age: 51
End: 2021-03-22

## 2021-03-22 ENCOUNTER — OFFICE VISIT (OUTPATIENT)
Dept: INTERNAL MEDICINE CLINIC | Age: 51
End: 2021-03-22
Payer: MEDICARE

## 2021-03-22 VITALS
HEIGHT: 66 IN | SYSTOLIC BLOOD PRESSURE: 130 MMHG | DIASTOLIC BLOOD PRESSURE: 80 MMHG | TEMPERATURE: 98.6 F | BODY MASS INDEX: 31.31 KG/M2 | HEART RATE: 94 BPM

## 2021-03-22 DIAGNOSIS — R42 DIZZINESS: ICD-10-CM

## 2021-03-22 DIAGNOSIS — E11.65 UNCONTROLLED TYPE 2 DIABETES MELLITUS WITH HYPERGLYCEMIA, WITH LONG-TERM CURRENT USE OF INSULIN (HCC): Primary | Chronic | ICD-10-CM

## 2021-03-22 DIAGNOSIS — G62.9 NEUROPATHY: ICD-10-CM

## 2021-03-22 DIAGNOSIS — R19.7 DIARRHEA, UNSPECIFIED TYPE: ICD-10-CM

## 2021-03-22 DIAGNOSIS — Z79.4 UNCONTROLLED TYPE 2 DIABETES MELLITUS WITH HYPERGLYCEMIA, WITH LONG-TERM CURRENT USE OF INSULIN (HCC): Primary | Chronic | ICD-10-CM

## 2021-03-22 PROCEDURE — 3017F COLORECTAL CA SCREEN DOC REV: CPT | Performed by: NURSE PRACTITIONER

## 2021-03-22 PROCEDURE — 2022F DILAT RTA XM EVC RTNOPTHY: CPT | Performed by: NURSE PRACTITIONER

## 2021-03-22 PROCEDURE — 99214 OFFICE O/P EST MOD 30 MIN: CPT | Performed by: NURSE PRACTITIONER

## 2021-03-22 PROCEDURE — 3046F HEMOGLOBIN A1C LEVEL >9.0%: CPT | Performed by: NURSE PRACTITIONER

## 2021-03-22 PROCEDURE — G8484 FLU IMMUNIZE NO ADMIN: HCPCS | Performed by: NURSE PRACTITIONER

## 2021-03-22 PROCEDURE — G8417 CALC BMI ABV UP PARAM F/U: HCPCS | Performed by: NURSE PRACTITIONER

## 2021-03-22 PROCEDURE — G8427 DOCREV CUR MEDS BY ELIG CLIN: HCPCS | Performed by: NURSE PRACTITIONER

## 2021-03-22 PROCEDURE — 1036F TOBACCO NON-USER: CPT | Performed by: NURSE PRACTITIONER

## 2021-03-22 RX ORDER — BLOOD-GLUCOSE TRANSMITTER
1 EACH MISCELLANEOUS
Qty: 1 EACH | Refills: 3 | Status: SHIPPED | OUTPATIENT
Start: 2021-03-22 | End: 2022-08-29 | Stop reason: SDUPTHER

## 2021-03-22 RX ORDER — GABAPENTIN 300 MG/1
CAPSULE ORAL
Qty: 60 CAPSULE | Refills: 0 | Status: SHIPPED | OUTPATIENT
Start: 2021-03-22 | End: 2021-03-22

## 2021-03-22 RX ORDER — BLOOD-GLUCOSE,RECEIVER,CONT
1 EACH MISCELLANEOUS
Qty: 1 DEVICE | Refills: 0 | Status: SHIPPED | OUTPATIENT
Start: 2021-03-22

## 2021-03-22 RX ORDER — GABAPENTIN 300 MG/1
CAPSULE ORAL
Qty: 90 CAPSULE | Refills: 0 | Status: SHIPPED | OUTPATIENT
Start: 2021-03-22 | End: 2021-04-15 | Stop reason: SDUPTHER

## 2021-03-22 RX ORDER — BLOOD-GLUCOSE SENSOR
1 EACH MISCELLANEOUS
Qty: 9 EACH | Refills: 3 | Status: SHIPPED | OUTPATIENT
Start: 2021-03-22 | End: 2022-08-29 | Stop reason: SDUPTHER

## 2021-03-22 NOTE — TELEPHONE ENCOUNTER
[facility] [place] [address] left a message saying that dexcom is not authorized through their pharmacy. Need to go through [facility]. Phone Number is [phone]. Eileen with diabetes center will send an email to the dexcom rep asking for help in getting this for pt through [facility]. Navjot Marroquin was notified.

## 2021-03-22 NOTE — PROGRESS NOTES
Strips Test blood sugars 4 times daily DX:E11.65, Disp: 400 strip, Rfl: 5    Lancets MISC, Use to test blood sugars 4 times daily DX:E11.65, Disp: 400 each, Rfl: 5    Insulin Syringe-Needle U-100 29G X 1/2\" 0.3 ML MISC, 1 each by Does not apply route 4 times daily (after meals and at bedtime), Disp: 200 each, Rfl: 0    vitamin D (ERGOCALCIFEROL) 1.25 MG (69423 UT) CAPS capsule, Take 1 capsule by mouth once a week Take for 8 weeks. (Patient not taking: Reported on 3/22/2021), Disp: 8 capsule, Rfl: 0    Continuous Blood Gluc Sensor (FREESTYLE MARIANELA 14 DAY SENSOR) MISC, 1 Device by Does not apply route 4 times daily (before meals and nightly) Change every 14 days (Patient not taking: Reported on 3/22/2021), Disp: 6 each, Rfl: 2    Continuous Blood Gluc  (FREESTYLE MARIANELA 14 DAY READER) LIANNE, 1 Device by Does not apply route 4 times daily (before meals and nightly) (Patient not taking: Reported on 3/22/2021), Disp: 1 Device, Rfl: 0    The patient is allergic to pcn [penicillins]; actos [pioglitazone]; clindamycin/lincomycin; and vancomycin. Past Medical History  [facility]  has a past medical history of Bipolar 2 disorder ([facility] [address] BPH (benign prostatic hyperplasia), Diabetes mellitus type 2, uncontrolled ([facility] [address] Diabetic peripheral neuropathy ([facility] [address] Diabetic polyneuropathy ([facility] [address] Diabetic ulcer of right foot associated with type 2 diabetes mellitus ([facility] [address] Essential hypertension, GERD (gastroesophageal reflux disease), Hammer toe of left foot, Heart murmur, History of tobacco abuse, Hx of AKA (above knee amputation), right ([facility] [address] Macular edema, diabetic, bilateral ([facility] [address] Marijuana abuse in remission, Melena, MRSA (methicillin resistant staph aureus) culture positive, Onychomycosis, Other disorders of kidney and ureter in diseases classified elsewhere, Sleep apnea, Thyroid disease, and 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     Past Surgical History  The patient  has a past surgical history that includes other surgical history (Right, 1/14/15); Abscess Drainage (Right); Toe amputation (Right, 1/16/15); Foot Debridement (Right, 07/01/2016); Leg amputation below knee (Right, 07/20/2016); [place] tooth extraction (?when); pr drain infect shoulder bursa (Left, 8/18/2017); pr office/outpt visit,procedure only (Right, 9/20/2018); incision and drainage (Right, 2/18/2019); Leg amputation below knee (Right, 4/4/2019); AMPUTATION ABOVE KNEE (Right, 4/18/2019); Upper gastrointestinal endoscopy (N/A, 3/3/2020); Cholecystectomy, laparoscopic (N/A, 4/1/2020); Endoscopy, colon, diagnostic; Cystoscopy (N/A, 7/20/2020); and Cystoscopy (N/A, 8/21/2020). Family History  This patient's family history includes Arthritis in his maternal grandfather; Depression in his mother; Diabetes in his mother; Early Death in his mother; Heart Disease in his maternal grandfather; High Blood Pressure in his mother; High Cholesterol in his mother; Other in his mother; Vision Loss in his maternal grandmother. Social History  [facility] Staff  reports that he quit smoking about 20 years ago. His smoking use included cigars. He started smoking about 36 years ago. He has a 10.00 pack-year smoking history. He has never used smokeless tobacco. He reports previous alcohol use. He reports that he does not use drugs.     Health Maintenance:    Health Maintenance   Topic Date Due    Hepatitis B vaccine (1 of 3 - Risk 3-dose series) Never done    DTaP/Tdap/Td vaccine (1 - Tdap) Never done    Shingles Vaccine (1 of 2) Never done    Colon cancer screen colonoscopy  Never done    Diabetic retinal exam  05/09/2019    Annual Wellness Visit (AWV)  Never done    Diabetic microalbuminuria test  10/10/2019    Flu vaccine (Season Ended) 01/22/2022 (Originally 9/1/2021)    A1C test (Diabetic or Prediabetic)  06/29/2021    Diabetic foot exam  01/22/2022    Lipid screen  02/26/2022    TSH testing  02/26/2022    Potassium monitoring  03/29/2022    Creatinine monitoring  03/29/2022    Pneumococcal 0-64 years Vaccine  Completed    COVID-19 Vaccine  Completed    Hepatitis C screen  Completed    HIV screen  Completed    Hepatitis A vaccine  Aged Out    Hib vaccine  Aged Out    Meningococcal (ACWY) vaccine  Aged Out       Subjective:      Review of Systems   Constitutional: Negative for chills, fatigue and fever. HENT: Negative for sore throat and trouble swallowing. Eyes: Negative for visual disturbance. Respiratory: Negative for cough and shortness of breath. Cardiovascular: Negative for chest pain and leg swelling. Gastrointestinal: Negative for abdominal pain, constipation, diarrhea, nausea and vomiting. Genitourinary: Negative for difficulty urinating. Musculoskeletal: Negative for arthralgias and myalgias. Skin: Negative for rash and wound. Neurological: Positive for numbness. Objective:     /80   Pulse 94   Temp 98.6 °F (37 °C)   Ht 5' 6\" (1.676 m)   BMI 31.31 kg/m²     Physical Exam  Vitals signs reviewed. Constitutional:       General: He is not in acute distress. Appearance: He is well-developed. He is not diaphoretic. HENT:      Head: Normocephalic and atraumatic. Mouth/Throat:      Pharynx: No oropharyngeal exudate. Neck:      Musculoskeletal: Normal range of motion and neck supple. Thyroid: No thyromegaly. Cardiovascular:      Rate and Rhythm: Normal rate and regular rhythm. Heart sounds: Normal heart sounds. No murmur. No friction rub. No gallop. Pulmonary:      Effort: Pulmonary effort is normal. No respiratory distress. Breath sounds: Normal breath sounds. No wheezing or rales. Abdominal:      General: There is no distension. Palpations: Abdomen is soft. Tenderness: There is no abdominal tenderness. Musculoskeletal: Normal range of motion. General: No tenderness.       Comments: Right AKA, using wheelchair   Lymphadenopathy:      Cervical: No cervical adenopathy. Skin:     General: Skin is warm and dry. Coloration: Skin is not pale. Findings: No erythema or rash. Neurological:      Mental Status: He is alert and oriented to person, place, and time. Labs Reviewed 3/22/2021:    Lab Results   Component Value Date    WBC 11.1 (H) 03/29/2021    HGB 15.6 03/29/2021    HCT 46.0 03/29/2021     03/29/2021    CHOL 161 10/10/2018    TRIG 235 (H) 02/26/2021    HDL 27 10/08/2020    LDLDIRECT 102.12 02/26/2021    ALT 26 02/26/2021    AST 34 02/26/2021     03/29/2021    K 4.5 03/29/2021    CL 97 (L) 03/29/2021    CREATININE 0.8 03/29/2021    BUN 14 03/29/2021    CO2 27 03/29/2021    TSH 3.450 02/26/2021    PSA 0.78 03/24/2020    INR 1.04 12/26/2020    GLUF 341 (H) 10/08/2020    LABA1C 10.2 (H) 03/29/2021    LABMICR 1.21 10/10/2018       Assessment/Plan      1. Uncontrolled type 2 diabetes mellitus with hyperglycemia, with long-term current use of insulin ([facility])  A1C 10.2%. Increase water intake. Increase mealtime insulin by additional 2 units. Glucose logs in a week after dairy reduction. Pt educated at length concerning dietary intake of simple sugars, milk, carbs. He persists he is doing these things but eating a lot of fruit and milk products. - Continuous Blood Gluc Transmit (DEXCOM G6 TRANSMITTER) MISC; 1 Device by Does not apply route every 3 months  Dispense: 1 each; Refill: 3  - Continuous Blood Gluc Sensor (DEXCOM G6 SENSOR) MISC; 1 Device by Does not apply route every 14 days  Dispense: 9 each; Refill: 3  - Continuous Blood Gluc  (DEXCOM G6 ) LIANNE; 1 Device by Does not apply route 4 times daily (before meals and nightly)  Dispense: 1 Device; Refill: 0  - gabapentin (NEURONTIN) 300 MG capsule; 2 caps in am, 1 caps in pm  Dispense: 90 capsule; Refill: 0    2. Neuropathy    - gabapentin (NEURONTIN) 300 MG capsule; 2 caps in am, 1 caps in pm  Dispense: 90 capsule; Refill: 0    3. Dizziness  Will check lytes. EKG.   Vestibular therapy. - Basic Metabolic Panel; Future  - Magnesium; Future  - EKG 12 Lead; Future  - CBC With Auto Differential; Future  - [facility]    4. Diarrhea  Try to avoid lactose - it sounds as if you are lactose intolerant. See GI - about diarrhea if not improved with avoidance of lactose. Return in about 4 weeks (around 4/19/2021) for Diabetes. Patient given educational materials - see patient instructions. Discussed use, benefit, and side effects of prescribed medications. All patient questions answered. Pt voiced understanding.         Electronically signed MARILYN Corcoran CNP on 3/22/21 at 11:00 AM EDT

## 2021-03-25 ENCOUNTER — APPOINTMENT (OUTPATIENT)
Dept: PHYSICAL THERAPY | Age: 51
End: 2021-03-25
Payer: MEDICARE

## 2021-03-29 ENCOUNTER — HOSPITAL ENCOUNTER (OUTPATIENT)
Age: 51
Discharge: HOME | End: 2021-03-29
Payer: MEDICARE

## 2021-03-29 ENCOUNTER — HOSPITAL ENCOUNTER (OUTPATIENT)
Dept: PHYSICAL THERAPY | Age: 51
Setting detail: THERAPIES SERIES
Discharge: HOME | End: 2021-03-29
Payer: MEDICARE

## 2021-03-29 DIAGNOSIS — R42 DIZZINESS: ICD-10-CM

## 2021-03-29 DIAGNOSIS — Z79.4 UNCONTROLLED TYPE 2 DIABETES MELLITUS WITH HYPERGLYCEMIA, WITH LONG-TERM CURRENT USE OF INSULIN (HCC): ICD-10-CM

## 2021-03-29 DIAGNOSIS — E11.65 UNCONTROLLED TYPE 2 DIABETES MELLITUS WITH HYPERGLYCEMIA, WITH LONG-TERM CURRENT USE OF INSULIN (HCC): ICD-10-CM

## 2021-03-29 LAB
ANION GAP SERPL CALCULATED.3IONS-SCNC: 11 MEQ/L (ref 8–16)
AVERAGE GLUCOSE: 246 MG/DL (ref 70–126)
BASOPHILS # BLD: 0.6 %
BASOPHILS ABSOLUTE: 0.1 THOU/MM3 (ref 0–0.1)
BUN BLDV-MCNC: 14 MG/DL (ref 7–22)
CALCIUM SERPL-MCNC: 9.7 MG/DL (ref 8.5–10.5)
CHLORIDE BLD-SCNC: 97 MEQ/L (ref 98–111)
CO2: 27 MEQ/L (ref 23–33)
CREAT SERPL-MCNC: 0.8 MG/DL (ref 0.4–1.2)
EOSINOPHIL # BLD: 3.1 %
EOSINOPHILS ABSOLUTE: 0.3 THOU/MM3 (ref 0–0.4)
ERYTHROCYTE [DISTWIDTH] IN BLOOD BY AUTOMATED COUNT: 13.6 % (ref 11.5–14.5)
ERYTHROCYTE [DISTWIDTH] IN BLOOD BY AUTOMATED COUNT: 45.4 FL (ref 35–45)
GFR SERPL CREATININE-BSD FRML MDRD: > 90 ML/MIN/1.73M2
GLUCOSE BLD-MCNC: 331 MG/DL (ref 70–108)
HBA1C MFR BLD: 10.2 % (ref 4.4–6.4)
HCT VFR BLD CALC: 46 % (ref 42–52)
HEMOGLOBIN: 15.6 GM/DL (ref 14–18)
IMMATURE GRANS (ABS): 0.07 THOU/MM3 (ref 0–0.07)
IMMATURE GRANULOCYTES: 0.6 %
LYMPHOCYTES # BLD: 21.1 %
LYMPHOCYTES ABSOLUTE: 2.3 THOU/MM3 (ref 1–4.8)
MAGNESIUM: 2.1 MG/DL (ref 1.6–2.4)
MCH RBC QN AUTO: 30.9 PG (ref 26–33)
MCHC RBC AUTO-ENTMCNC: 33.9 GM/DL (ref 32.2–35.5)
MCV RBC AUTO: 91.1 FL (ref 80–94)
MONOCYTES # BLD: 7.3 %
MONOCYTES ABSOLUTE: 0.8 THOU/MM3 (ref 0.4–1.3)
NUCLEATED RED BLOOD CELLS: 0 /100 WBC
PLATELET # BLD: 238 THOU/MM3 (ref 130–400)
PMV BLD AUTO: 9.8 FL (ref 9.4–12.4)
POTASSIUM SERPL-SCNC: 4.5 MEQ/L (ref 3.5–5.2)
RBC # BLD: 5.05 MILL/MM3 (ref 4.7–6.1)
SEG NEUTROPHILS: 67.3 %
SEGMENTED NEUTROPHILS ABSOLUTE COUNT: 7.5 THOU/MM3 (ref 1.8–7.7)
SODIUM BLD-SCNC: 135 MEQ/L (ref 135–145)
WBC # BLD: 11.1 THOU/MM3 (ref 4.8–10.8)

## 2021-03-29 PROCEDURE — 83735 ASSAY OF MAGNESIUM: CPT

## 2021-03-29 PROCEDURE — 80048 BASIC METABOLIC PNL TOTAL CA: CPT

## 2021-03-29 PROCEDURE — 36415 COLL VENOUS BLD VENIPUNCTURE: CPT

## 2021-03-29 PROCEDURE — 85025 COMPLETE CBC W/AUTO DIFF WBC: CPT

## 2021-03-29 PROCEDURE — 83036 HEMOGLOBIN GLYCOSYLATED A1C: CPT

## 2021-03-29 PROCEDURE — 97161 PT EVAL LOW COMPLEX 20 MIN: CPT

## 2021-03-29 NOTE — PROGRESS NOTES
** PLEASE SIGN, DATE AND TIME CERTIFICATION BELOW AND RETURN TO [facility] OUTPATIENT REHABILITATION (FAX #: [phone]). ATTEST/CO-SIGN IF ACCESSING VIA INVCE. THANK YOU.**    I certify that I have examined the patient below and determined that Physical Medicine and Rehabilitation service is necessary and that I approve the established plan of care for up to 90 days or as specifically noted. Attestation, signature or co-signature of physician indicates approval of certification requirements.    ________________________ ____________ __________  Physician Signature   Date   Time  Allie Villarreal 58  PHYSICAL THERAPY  [x] VESTIBULAR EVALUATION  [] DAILY NOTE [] PROGRESS NOTE [] DISCHARGE NOTE    [x] [facility]   [] [facility]    [] [facility]   [] Aranza Daunt    Date: 3/29/2021  Patient Name:  Caren President  : 1970  MRN: [id number]  CSN: [id number]    Referring Practitioner Sonu Marshall, APR*   Diagnosis Dizziness and giddiness [R42]    Treatment Diagnosis Non-active BPPV   Date of Evaluation 3/29/21   Additional Pertinent History Right AKA. HTN, bipolar, diabetes, SOB. Functional Outcome Measure Used [facility]   Functional Outcome Score 26/100 (3/29/21)       Insurance: Primary: Payor: Marielos Weir /  /  / ,   Secondary: MEDICAID [place]   Authorization Information: Aquatics and modalities covered, massage covered, telehealth covered   Visit # 1, 1/10 for progress note   Visits Allowed: Unlimited based on medical necessity   Recertification Date:    Physician Follow-Up: 21   Physician Orders:    History of Present Illness: Pt reports intermittent dizziness that only lasts a day or 2. Episodes occur every couple months. SUBJECTIVE: Pt reports episodes occur when laying down and getting up. Pt sleeps in bed but usually sidelying. Pt describes dizziness as room spinning, only lasting short periods of time.  Pt presents in wheelchair. Pt scheduled for carpal tunnel surgery Wednesday. Social/Functional History and Current Status:  Medications and Allergies have been reviewed and are listed on Medical History Questionnaire. Eol Boothe lives alone in a single story home with a level surface to enter. Task Previous Current   ADLs  Modified Independent Modified Independent   IADL's Modified Independent Modified Independent   Ambulation Not Applicable Not Applicable   Transfers Modified Independent Modified Independent   Recreation Not Applicable Not Applicable   Community Integration Modified Independent Modified Independent   Driving Active  Drives with self-imposed restrictions   Work On Disability  On Disability       Precautions:     Pain 10/10 right arm secondary to pinched nerve in right hand   Neck ROM AROM WFL   Vertebral Artery Testing    Visual Field normal   Oculomotor/VOR normal   Balance Good sitting balance   Gait Doesn't ambulate d/t not having prosthesis   Transfer Wheelchair to/from mat table mod I   Special Testing BPPV Adam Hallpike: negative B  Roll test: negative B         TREATMENT   Precautions:    Pain:     X in shaded column indicates activity completed today   Modalities Parameters/  Location  Notes                     Manual Therapy Time/Technique  Notes                     Exercise/Intervention   Notes                                                                                  Specific Interventions Next Treatment: reassess Savona Hallpike and roll test    Activity/Treatment Tolerance:  [x]  Patient tolerated treatment well  []  Patient limited by fatigue  []  Patient limited by pain   []  Patient limited by medical complications  []  Other:     Assessment: Patient presents with intermittent vertigo and currently not having active episode. Savona Hallpike and roll test negative B for pt report of dizziness and no nystagmus.  Plan to hold 2 weeks to allow patient to return during active episode but no treatment was necessary this date. Body Structures/Functions/Activity Limitations: vestibular impairment  Prognosis: good    GOALS:  Patient Goal: Eliminate dizziness    Short Term Goals:  Time Frame: see LTGs      Long Term Goals:  Time Frame: 6 weeks  Patient will have negative B [place] Hallpike test to allow for safe transitional motions. Patient will have negative B Roll test to allow for safe transitional motion. Patient will reduce Dizziness Handicap Inventory score from 26/100 to <10/100 to tolerate bending over, quick head movements, and getting in/out of bed. Patient Education:   [x]  HEP/Education Completed: Plan of Care, Goals, attendance policy, return sooner if episode occurs   [place] Access Code:  []  No new Education completed  []  Reviewed Prior HEP      [x]  Patient verbalized and/or demonstrated understanding of education provided. []  Patient unable to verbalize and/or demonstrate understanding of education provided. Will continue education. []  Barriers to learning:     PLAN:  Treatment Recommendations: Neuromuscular Re-education, Home Exercise Program, Patient Education and Vestibular Rehabilitation    [x]  Plan of care initiated. Plan to see patient 1 times per week for 6 weeks to address the treatment planned outlined above.   []  Continue with current plan of care  []  Modify plan of care as follows:    []  Hold pending physician visit  []  Discharge    Time In 1357   Time Out 1420   Timed Code Minutes: 0 min   Total Treatment Time: 23 min       Electronically Signed by: Arabella Lan

## 2021-04-05 ASSESSMENT — ENCOUNTER SYMPTOMS
ABDOMINAL PAIN: 0
NAUSEA: 0
SORE THROAT: 0
VOMITING: 0
CONSTIPATION: 0
COUGH: 0
SHORTNESS OF BREATH: 0
TROUBLE SWALLOWING: 0
DIARRHEA: 0

## 2021-04-06 ENCOUNTER — OFFICE VISIT (OUTPATIENT)
Dept: INTERNAL MEDICINE CLINIC | Age: 51
End: 2021-04-06
Payer: MEDICARE

## 2021-04-06 VITALS
TEMPERATURE: 98.6 F | OXYGEN SATURATION: 98 % | DIASTOLIC BLOOD PRESSURE: 82 MMHG | HEART RATE: 108 BPM | SYSTOLIC BLOOD PRESSURE: 140 MMHG | BODY MASS INDEX: 29.41 KG/M2 | WEIGHT: 183 LBS | HEIGHT: 66 IN

## 2021-04-06 DIAGNOSIS — I10 ESSENTIAL HYPERTENSION: ICD-10-CM

## 2021-04-06 DIAGNOSIS — E11.65 UNCONTROLLED TYPE 2 DIABETES MELLITUS WITH HYPERGLYCEMIA, WITH LONG-TERM CURRENT USE OF INSULIN (HCC): ICD-10-CM

## 2021-04-06 DIAGNOSIS — Z79.4 UNCONTROLLED TYPE 2 DIABETES MELLITUS WITH HYPERGLYCEMIA, WITH LONG-TERM CURRENT USE OF INSULIN (HCC): ICD-10-CM

## 2021-04-06 DIAGNOSIS — G56.01 CARPAL TUNNEL SYNDROME OF RIGHT WRIST: ICD-10-CM

## 2021-04-06 DIAGNOSIS — E78.5 HYPERLIPIDEMIA LDL GOAL <70: ICD-10-CM

## 2021-04-06 DIAGNOSIS — R00.0 TACHYCARDIA: ICD-10-CM

## 2021-04-06 DIAGNOSIS — Z01.818 PRE-OP EXAM: Primary | ICD-10-CM

## 2021-04-06 DIAGNOSIS — G56.21 CUBITAL TUNNEL SYNDROME ON RIGHT: ICD-10-CM

## 2021-04-06 PROCEDURE — G8427 DOCREV CUR MEDS BY ELIG CLIN: HCPCS | Performed by: NURSE PRACTITIONER

## 2021-04-06 PROCEDURE — 3017F COLORECTAL CA SCREEN DOC REV: CPT | Performed by: NURSE PRACTITIONER

## 2021-04-06 PROCEDURE — G8417 CALC BMI ABV UP PARAM F/U: HCPCS | Performed by: NURSE PRACTITIONER

## 2021-04-06 PROCEDURE — 93000 ELECTROCARDIOGRAM COMPLETE: CPT | Performed by: NURSE PRACTITIONER

## 2021-04-06 PROCEDURE — 1036F TOBACCO NON-USER: CPT | Performed by: NURSE PRACTITIONER

## 2021-04-06 PROCEDURE — 99214 OFFICE O/P EST MOD 30 MIN: CPT | Performed by: NURSE PRACTITIONER

## 2021-04-06 PROCEDURE — 2022F DILAT RTA XM EVC RTNOPTHY: CPT | Performed by: NURSE PRACTITIONER

## 2021-04-06 PROCEDURE — 3046F HEMOGLOBIN A1C LEVEL >9.0%: CPT | Performed by: NURSE PRACTITIONER

## 2021-04-06 NOTE — PROGRESS NOTES
[facility] PROFESSIONAL SERVS  PHYSICIANS [facility] [address], [address] [facility]  Dept: [phone]  Dept Fax: [phone]    Lizzette Rees  46 y.o.  1970  [id number]    Chief Complaint   Patient presents with    Pre-op Exam     right carpal tunnel        Pt is a 46 y.o. male who presents for a preoperative medical consultation at the request of Dr. Ruby Mckinnon prior to right hand cubital and carpal tunnel release scheduled for 4/8/2021 at 9:30 am at [facility]. Pt complains of bilateral hand pain which is 10/10, burning in quality. Pain is improved with nothing. Pain is worsened by everything, even sleeping. Pt has failed conservative measures such as gabapentin, exercises. Reactions to anesthesia: none    History of excessive bleeding: none    History of blood clots: none    History of blood transfusions: none      Reactions to blood transfusion: none     DM 2 - He stopped eating after 9 pm, cut sweets and switched from regular milk to soy free/lactose free milk, unsweetened. Glucose currently 119. Breakfast - 4-5 pieces of ohara, banana, zero sugar mountain dew  Lunch - 2 ham salad sandwiches, cheese, wheat bread, zero sugar mountain dew   Dinner - Sausage, tater tots planned for tonight, but he is thinking he will have ham salad sandwich again. Lantus 60 units this am, was 90 units twice daily, his glucose was 84 this am. Reduce Lantus to 80 units twice daily for now as he is paying more attention to his glucose. Humalog 30 units with his meal today, previously has been as 50 units with meals if his glucose is >300. He has a history of right AKA due to DM. He has chronic left neuropathy. Glucose is much improved over the past week since wearing Dexcom. Having issues with adhesive adherence. Sinus tachycardia - Workup by Dr. Feroz Bazzi. He had a negative stress test. Echo with preserved EF 60%,   HR today 110, EKG without acute changes.        Stress test 6/18/2020     Peak HR:119 bpm                             HR/BP product:[id number]   Peak BP:140/67 mmHg   Predicted HR: 168 bpm   % of predicted HR: 71   Test duration: 3 min   Reason for termination:Protocol completed      ECG Findings   No ischemic EKG changes. Arrhythmias   No stress induced arrhythmia. Symptoms   SOB and palpitations resolved in recovery      Complications   Procedure complication was none. Stress Interpretation   No chest pain during the stress. No stress induced arrhythmia. Lexiscan EKG stress test is not suggestive for ischemia. Imaging Results:Calculated gated LVEF 73 %. The T.I.D. ratio was 1.09 . There is mild attenuation artifact noted in the inferior wall seems to be  related to bowel artifact. There was no evidence of definite reversible tracer uptake. The nuclear images is not suggestive for myocardial ischemia.      Conclusions      Summary   Lexiscan EKG stress test is not suggestive for ischemia. The nuclear images is not suggestive for myocardial ischemia. Echo 9/15/2020  Conclusions      Summary   Normal left ventricle size and systolic function. Ejection fraction was   estimated at 60 %. There were no regional left ventricular wall motion   abnormalities and wall thickness was within normal limits. Doppler parameters were consistent with abnormal left ventricular   relaxation (grade 1 diastolic dysfunction). The pericardium was normal in appearance with no evidence of a pericardial   effusion. 12/4/2020 Cardiac event monitor    CLINICAL HISTORY AND INDICATION:  This is a patient with palpitation.     EVENT MONITOR DESCRIPTION:  Event monitor was attached to the patient  between 12/04/2020 and 12/24/2020.     EVENT MONITOR FINDINGS:  Baseline rhythm showed sinus rhythm. Otherwise  unremarkable event monitor.     CONCLUSION:  1. Sinus rhythm. 2.  Unremarkable event monitor. Hypothyroidism - On Synthroid 50 mcg daily.    Denies thyroid s/s including dysphagia, dysphonia, brittle hair/nails, gritty dry eyes, fatigue, hot/cold intolerance. HTN - /82 today. On Metoprolol 75 mg BID, Lisinopril 5 mg daily. Labs reviewed from 3/29/2021, renal function intact with BUN 14, crt 0.8, K 4.5. WBC 11.1, Hg 15.6, Hct 46.0, plt 238. INR from 12/26/2020 1.04, from 8/22/2020 1.03. Past Medical History:   Diagnosis Date    Bipolar 2 disorder [facility])     previously followed with Dr. Masoud Marvin and Jae Brunson in [facility] BPH (benign prostatic hyperplasia)     Diabetes mellitus type 2, uncontrolled ([address]     HgbA1c on 4/2/2019 was 9.1.     Diabetic peripheral neuropathy (HCC)     Diabetic polyneuropathy ([address]     Diabetic ulcer of right foot associated with type 2 diabetes mellitus ([address] 12/10/2015    Essential hypertension     \"never been on b/p medication that I know of\"    GERD (gastroesophageal reflux disease)     Hammer toe of left foot     Heart murmur     denies any chest pain or palpitations    History of tobacco abuse     Hx of AKA (above knee amputation), right ([address] 04/18/2019    Dr. Ry Graves edema, diabetic, bilateral ([address] 05/04/2018    Dr. Courtney House referred to retina specialist for 2nd opinion    Marijuana abuse in remission     Melena     MRSA (methicillin resistant staph aureus) culture positive     Onychomycosis     Other disorders of kidney and ureter in diseases classified elsewhere     Sleep apnea     no cpap    Thyroid disease     WD-Skin ulcer of fourth toe of right foot with necrosis of bone ([address] 6/29/2016       Past Surgical History:   Procedure Laterality Date    ABSCESS DRAINAGE Right     foot    AMPUTATION ABOVE KNEE Right 4/18/2019    RIGHT ABOVE KNEE AMPUTATION performed by Josias Hoang MD at [address] [place] [address], LAPAROSCOPIC N/A 4/1/2020    ROBOTIC CHOLECYSTECTOMY performed by Gerald Cagle MD at [address] [place] [address] N/A 7/20/2020    CYSTOSCOPY performed by Kenia Mcleod MD at [address] 2020    CYSTOSCOPY, UROLIFT performed by Ciera Pineda MD at [address] [facility], DIAGNOSTIC      FOOT DEBRIDEMENT Right 2016    I & D    INCISION AND DRAINAGE Right 2019    I&D RIGHT STUMP performed by Nestor Hodgson MD at [address] [facility] Right 2016    LEG AMPUTATION BELOW KNEE Right 2019    I&D AND REVISION OF AMPUTATION RIGHT LEG performed by Nestor Hodgson MD at [address] [facility] Right 1/14/15    sole of foot I&D    NH DRAIN INFECT SHOULDER BURSA Left 2017    LEFT SHOULDER INCISION AND DRAINAGE performed by Nestor Hodgson MD at [address] [facility] OFFICE/OUTPT [facility] Right 2018    EXCISIONAL DEBRIDEMENT RIGHT BKA STUMP performed by Ilir Galaviz MD at [address] Right 1/16/15    2nd toe with wound vac applied    UPPER GASTROINTESTINAL ENDOSCOPY N/A 3/3/2020    EGD DILATION SAVORY performed by Honey Plaza MD at [facility] [facility]  ? when       Social History     Socioeconomic History    Marital status:      Spouse name: Not on file    Number of children: 2    Years of education: 15    Highest education level: Not on file   Occupational History    Not on file   Social Needs    Financial resource strain: Not on file    Food insecurity     Worry: Not on file     Inability: Not on file   [employer] needs     Medical: Not on file     Non-medical: Not on file   Tobacco Use    Smoking status: Former Smoker     Packs/day: 1.00     Years: 10.00     Pack years: 10.00     Types: Cigars     Start date: 1985     Quit date: 2000     Years since quittin.6    Smokeless tobacco: Never Used   Substance and Sexual Activity    Alcohol use: Not Currently     Alcohol/week: 0.0 standard drinks    Drug use: No     Comment: 30 YEARS AGO    Sexual activity: Yes     Partners: Female   Lifestyle    Physical activity Rash, with erythematous plaques to abdomen, shoulders, and proximal upper extremities with pruritis, persisted with 2nd dose administered slower. STOP-Bang questionnaire    1. Do you Snore loudly (loud enough to be heard through closed doors or your bed partner elbows you for snoring at night)? No    2. Do you often feel Tired, Fatigued, or Sleepy during the daytime (such as falling asleep during driving)? No    3. Has anyone Observed you Stop Breathing or Choking/Gasping during your sleep? No    4. Do you have or are being treated for High Blood Pressure? Yes    5. Body Mass Index more than 35kg/m2? No    6. Age older than 48years old? Yes    7. Neck size large? (measured around Jacome apple) Yes - 19\"   For males, is your shirt collar 17 inches or larger? For females, is your shirt collar 16 inches or larger? 8. Gender= Male? Yes      Total Score:4     Scoring Criteria:    For general population:   Low Risk of MURALI: Yes to 0 to 2 questions   Intermediate Risk of MURALI: Yes to 3 to 4 questions   High Risk of MURALI: Yes to 5 to 8 questions     Review of Systems - General ROS: negative for - chills or fever  Psychological ROS: negative for - anxiety and depression  Hematological and Lymphatic ROS: No history of blood clots or bleeding disorder. Respiratory ROS: no cough, shortness of breath, or wheezing  Cardiovascular ROS: no chest pain or dyspnea on exertion, tolerates vacuuming, wheeled himself into the office from the parking lot without intolerance  Gastrointestinal ROS: no abdominal pain, change in bowel habits, or black or bloody stools  Genito-Urinary ROS: no dysuria, trouble voiding, or hematuria  Musculoskeletal ROS: negative for - muscle pain or muscular weakness, positive bilateral hand pain and numbness, left foot neuropathic changes - numbness. Trace edema left lower leg - dependant.    Neurological ROS: negative for - headaches, seizures or weakness  Dermatological ROS: negative for - rash or skin lesion changes    Blood pressure (!) 140/82, pulse 108, temperature 98.6 °F (37 °C), temperature source Temporal, height 5' 6\" (1.676 m), weight 183 lb (83 kg), SpO2 98 %. Physical Examination:   Constitutional - Alert, cooperative, well groomed, and in no distress. Vital signs stable   Vitals:    04/06/21 1445   BP: (!) 140/82   Pulse: 108   Temp: 98.6 °F (37 °C)   SpO2: 98%     Mental status - Alert and oriented to person, place, and time. Good insight, appropriate responses, mood appropriate. Head - Atraumatic, normocephalic. Eyes - Pupils equal and reactive to light, extraocular movements intact  Ears - External ears are normal, hearing is grossly normal to whispered voice and finger rub, otoscopic exam pearly gray TM\"s with good light reflex. Mouth - Oropharynx clear, mucous membranes pink and moist, dentition intact. Mallampati grade 3 airway  Neck - supple, no significant adenopathy, no JVD. Chest - No distress. No increased work of breathing. Clear to auscultation in all fields, no wheezes, rales or rhonchi, symmetric air entry. Heart - normal rate, regular rhythm, normal S1, S2, no murmurs, rubs  or gallops  Abdomen -soft, nontender, nondistended  Neurological - alert, oriented, cranial nerves II-XII intact without noted deficits, motor and sensation are grossly intact bilateral upper and lower extremities. Extremities - peripheral pulses normal, no pedal edema, no clubbing or cyanosis  Skin - warm and dry, no rashes, lesions, or wounds evident    Diagnostic data:   I have reviewed recent diagnostic testing including labs, EKG. Please see EKG for interpretation.     Lab Results   Component Value Date     03/29/2021    K 4.5 03/29/2021    K 4.6 09/10/2020    CL 97 03/29/2021    CO2 27 03/29/2021    BUN 14 03/29/2021    CREATININE 0.8 03/29/2021    GLUCOSE 331 03/29/2021    GLUCOSE 114 12/26/2020    CALCIUM 9.7 03/29/2021      Lab Results   Component Value Date    LABA1C 10.2 (H) 03/29/2021     Lab Results   Component Value Date     06/30/2016     Lab Results   Component Value Date    CHOL 161 10/10/2018     Lab Results   Component Value Date    TRIG 235 (H) 02/26/2021    TRIG 328 (H) 10/10/2018     Lab Results   Component Value Date    HDL 27 10/08/2020    HDL 30 10/10/2018     Lab Results   Component Value Date    LDLCALC 72 10/08/2020    [address] 10/10/2018     No results found for: LABVLDL, VLDL  No results found for: CHOLHDLRATIO  Lab Results   Component Value Date    WBC 11.1 (H) 03/29/2021    HGB 15.6 03/29/2021    HCT 46.0 03/29/2021    MCV 91.1 03/29/2021     03/29/2021     Chest xray 12/26/2020  FINDINGS:          Mild prominent interstitial markings are present.  There is no     demonstrated pleural abnormality.          Normal size heart.   Normal mediastinum and david.  Prominent pulmonary     vascularity  Normal visualized aortic arch and descending thoracic aorta.          Normal visualized thoracic spine.  Normal visualized ribs, clavicles, and     shoulders.          There is no demonstrated abnormality of the visualized soft tissue     structures of the upper abdomen.     ___________________________________          IMPRESSION:     Similar exam from previous with mild prominent pulmonary vascularity and     interstitial markings with no distinct focal airspace disease. 9/2/2020  Narrative   PROCEDURE: XR CHEST PORTABLE       CLINICAL INFORMATION: sob.       COMPARISON: No prior study.       TECHNIQUE: AP upright view of the chest was obtained.       FINDINGS:    The lungs are hypoinflated which accentuates the pulmonary vasculature.         The cardiac silhouette and pulmonary vasculature are within normal limits.       There is no significant pleural effusion or pneumothorax.       Visualized portions of the upper abdomen are within normal limits.       The osseous structures are intact.  No acute fractures or suspicious osseous lesions.         Impression   There is no acute intrathoracic process. Assessment and Plan:    Patient is an acceptable surgical candidate for planned procedure, intermediate risk patient having low risk procedure, STOP-Bang score 4 - intermediate risk of MURALI, will get sleep study in the future, pt educated concerning possible risk for requiring CPAP to awaken from anesthesia, he wishes to proceed. Cardiac risk assessment:  Patient has 2 (DM on insulin -  Not well controlled, obesity with large neck size) clinical predictors of cardiac risk and tolerates approximately 4 mets of activity, limited by wheelchair use not activity intolerance. Recent stress test June 2020 without ischemia, echo and event monitor without evidence of arrythmia or LV dysfunction. ST at baseline for pt. Patient is scheduled for an elective low risk surgery and is considered at an acceptable cardiac risk based on ACC/AHA criteria. RCRI risk 1 point, class II, 1.0% risk. Diagnosis Orders   1. Pre-op exam  EKG 12 Lead   2. Uncontrolled type 2 diabetes mellitus with hyperglycemia, with long-term current use of insulin ([facility])  Insulin Glargine, 2 Unit Dial, 300 UNIT/ML SOPN    insulin lispro (HUMALOG KWIKPEN U-200) 200 UNIT/ML SOPN pen   3. Carpal tunnel syndrome of right wrist     4. Cubital tunnel syndrome on right     5. Essential hypertension     6. Hyperlipidemia LDL goal <70     7. Tachycardia       EKG sinus tachycardia, rate 110, no acute ST/T wave changes. Increase Metoprolol to 100 mg twice daily, currently on 75 mg twice daily. Recheck EKG at next visit. He has seen Dr. Garima Lowe in the past, last visit 12/2020, he had an event monitor, stress test, echo as noted in HPI all are unrevealing. He has suboptimally controlled diabetes, with A1C 10.2%. However, with application of Dexcom his glucose is improving vastly. Advised not to take Jardiance or Humalog am of surgery.    Take half normal long acting insulin in am. DVT prophylaxis - Recommend early ambulation, venous return exercises, SCDs, AUGUSTO hose and a low molecular weight heparin such as Lovenox 30 mg subcutaneously Q12 hrs, or Xarelto per discretion of the surgeon.      Allergies: Pcn [penicillins], Actos [pioglitazone], Clindamycin/lincomycin, and Vancomycin       Electronically signed MARILYN Guidry - CNP 04/07/21 8:21 AM

## 2021-04-07 ENCOUNTER — TELEPHONE (OUTPATIENT)
Dept: INTERNAL MEDICINE CLINIC | Age: 51
End: 2021-04-07

## 2021-04-07 NOTE — TELEPHONE ENCOUNTER
I spoke to pt. He reports a BS of 221 about 45 minutes after his meal.  He did not take any short acting insulin and says he is getting ready to take his lantus. Per vo from Alejandra/it is too late to take lantus now. He should take 50 units of lantus tonight. If his am sugar is over 200 he should take 25 units of Lantus. Below 200 he is to hold. With his next meal we will start the following scale for his Humalog. 100-149  10 units   150-199   15 units  200-249   20 units  250-299    25 units  300            30 units    Pt was notified.

## 2021-04-07 NOTE — TELEPHONE ENCOUNTER
Verify what dose of Lantus he took last night, have him reduce tonights dose by 10 units, and if less than 100 hold Lantus in am.

## 2021-04-07 NOTE — TELEPHONE ENCOUNTER
----- Message from MARILYN Hall CNP sent at 4/7/2021  8:22 AM EDT -----  Let him know I discussed case with Dr Silvana Zavala, clearing him for low risk surgery. Increase Metoprolol to 100 mg twice daily from 75 mg twice daily due to sinus tach, hold Jardiance am of surgery and take 1/2 dose Lantus am of surgery.

## 2021-04-07 NOTE — TELEPHONE ENCOUNTER
I spoke to pt and advised him of colin's medication recommendation. He states his BS yesterday am was 89 and this am was 99. He  is nervous about taking his lantus at all tomorrow am.  Please advise.

## 2021-04-12 ENCOUNTER — HOSPITAL ENCOUNTER (OUTPATIENT)
Dept: PHYSICAL THERAPY | Age: 51
Setting detail: THERAPIES SERIES
Discharge: HOME | End: 2021-04-12
Payer: MEDICARE

## 2021-04-12 NOTE — DISCHARGE SUMMARY
[facility]    Patient Name: Juan Gillis        CSN: [id number]   YOB: 1970  Gender: male  [place]*,    Dizziness and giddiness [R42] ,      Patient is discharged from Physical Therapy services at this time. See last note for details related to results of therapy and goal achievement. Reason for discharge: Patient requested to be discharged. Pt cancelled last scheduled appointment d/t hand still hurting from surgery. Pt denies having any dizziness issues.        Sangeetha Marcos DPT, #[id number]

## 2021-04-15 DIAGNOSIS — G62.9 NEUROPATHY: ICD-10-CM

## 2021-04-15 DIAGNOSIS — Z79.4 UNCONTROLLED TYPE 2 DIABETES MELLITUS WITH HYPERGLYCEMIA, WITH LONG-TERM CURRENT USE OF INSULIN (HCC): ICD-10-CM

## 2021-04-15 DIAGNOSIS — E11.65 UNCONTROLLED TYPE 2 DIABETES MELLITUS WITH HYPERGLYCEMIA, WITH LONG-TERM CURRENT USE OF INSULIN (HCC): ICD-10-CM

## 2021-04-19 ENCOUNTER — OFFICE VISIT (OUTPATIENT)
Dept: INTERNAL MEDICINE CLINIC | Age: 51
End: 2021-04-19
Payer: MEDICARE

## 2021-04-19 VITALS — TEMPERATURE: 97.6 F | DIASTOLIC BLOOD PRESSURE: 70 MMHG | HEART RATE: 82 BPM | SYSTOLIC BLOOD PRESSURE: 150 MMHG

## 2021-04-19 DIAGNOSIS — G62.9 NEUROPATHY: ICD-10-CM

## 2021-04-19 DIAGNOSIS — I10 ESSENTIAL HYPERTENSION: ICD-10-CM

## 2021-04-19 DIAGNOSIS — E11.65 UNCONTROLLED TYPE 2 DIABETES MELLITUS WITH HYPERGLYCEMIA, WITH LONG-TERM CURRENT USE OF INSULIN (HCC): Primary | ICD-10-CM

## 2021-04-19 DIAGNOSIS — E03.9 HYPOTHYROIDISM, UNSPECIFIED TYPE: ICD-10-CM

## 2021-04-19 DIAGNOSIS — R00.0 TACHYCARDIA: ICD-10-CM

## 2021-04-19 DIAGNOSIS — S78.111A ABOVE KNEE AMPUTATION OF RIGHT LOWER EXTREMITY (HCC): ICD-10-CM

## 2021-04-19 DIAGNOSIS — Z79.4 UNCONTROLLED TYPE 2 DIABETES MELLITUS WITH HYPERGLYCEMIA, WITH LONG-TERM CURRENT USE OF INSULIN (HCC): Primary | ICD-10-CM

## 2021-04-19 DIAGNOSIS — F33.2 SEVERE EPISODE OF RECURRENT MAJOR DEPRESSIVE DISORDER, WITHOUT PSYCHOTIC FEATURES (HCC): ICD-10-CM

## 2021-04-19 DIAGNOSIS — E78.5 HYPERLIPIDEMIA LDL GOAL <70: ICD-10-CM

## 2021-04-19 DIAGNOSIS — F31.9 BIPOLAR 1 DISORDER (HCC): ICD-10-CM

## 2021-04-19 PROCEDURE — G8427 DOCREV CUR MEDS BY ELIG CLIN: HCPCS | Performed by: NURSE PRACTITIONER

## 2021-04-19 PROCEDURE — 99214 OFFICE O/P EST MOD 30 MIN: CPT | Performed by: NURSE PRACTITIONER

## 2021-04-19 PROCEDURE — 3046F HEMOGLOBIN A1C LEVEL >9.0%: CPT | Performed by: NURSE PRACTITIONER

## 2021-04-19 PROCEDURE — 2022F DILAT RTA XM EVC RTNOPTHY: CPT | Performed by: NURSE PRACTITIONER

## 2021-04-19 PROCEDURE — 3017F COLORECTAL CA SCREEN DOC REV: CPT | Performed by: NURSE PRACTITIONER

## 2021-04-19 PROCEDURE — 1036F TOBACCO NON-USER: CPT | Performed by: NURSE PRACTITIONER

## 2021-04-19 PROCEDURE — G8417 CALC BMI ABV UP PARAM F/U: HCPCS | Performed by: NURSE PRACTITIONER

## 2021-04-19 RX ORDER — GABAPENTIN 300 MG/1
CAPSULE ORAL
Qty: 90 CAPSULE | Refills: 1 | Status: SHIPPED | OUTPATIENT
Start: 2021-04-19 | End: 2021-05-14 | Stop reason: SDUPTHER

## 2021-04-19 RX ORDER — LEVOTHYROXINE SODIUM 0.05 MG/1
50 TABLET ORAL DAILY
Qty: 90 TABLET | Refills: 1 | Status: ON HOLD | OUTPATIENT
Start: 2021-04-19 | End: 2021-07-28

## 2021-04-19 RX ORDER — SERTRALINE HYDROCHLORIDE 100 MG/1
100 TABLET, FILM COATED ORAL DAILY
Qty: 30 TABLET | Refills: 5 | Status: ON HOLD | OUTPATIENT
Start: 2021-04-19 | End: 2021-07-28 | Stop reason: ALTCHOICE

## 2021-04-19 RX ORDER — PEN NEEDLE, DIABETIC 30 GX3/16"
NEEDLE, DISPOSABLE MISCELLANEOUS
Qty: 150 EACH | Refills: 5 | Status: SHIPPED | OUTPATIENT
Start: 2021-04-19 | End: 2021-11-09 | Stop reason: SDUPTHER

## 2021-04-19 NOTE — PATIENT INSTRUCTIONS
Take Pantoprazole as needed for reflux. Continues Lantus 60 units twice daily, Humalog 35 units with meals. Restart Jardiance 10 mg daily. Dexcom reader download next week on Monday for adjustments. Restart your other medications as ordered.

## 2021-04-19 NOTE — PROGRESS NOTES
Continuous Blood Gluc Sensor (DEXCOM G6 SENSOR) MISC, 1 Device by Does not apply route every 14 days, Disp: 9 each, Rfl: 3    Continuous Blood Gluc  (DEXCOM G6 ) LIANNE, 1 Device by Does not apply route 4 times daily (before meals and nightly), Disp: 1 Device, Rfl: 0    Lancets MISC, Use to test blood sugars 4 times daily DX:E11.65, Disp: 400 each, Rfl: 5    Insulin Syringe-Needle U-100 29G X 1/2\" 0.3 ML MISC, 1 each by Does not apply route 4 times daily (after meals and at bedtime), Disp: 200 each, Rfl: 0    cephALEXin (KEFLEX) 500 MG capsule, Take 1 capsule by mouth 4 times daily for 10 days, Disp: 40 capsule, Rfl: 0    sulfamethoxazole-trimethoprim (BACTRIM DS) 800-160 MG per tablet, Take 1 tablet by mouth 2 times daily for 10 days, Disp: 20 tablet, Rfl: 0    HYDROcodone-acetaminophen (NORCO) 5-325 MG per tablet, Take 1 tablet by mouth every 6 hours as needed for Pain for up to 3 days. Intended supply: 3 days. Take lowest dose possible to manage pain, Disp: 10 tablet, Rfl: 0    gabapentin (NEURONTIN) 300 MG capsule, 2 caps in am, 1 caps in pm, Disp: 90 capsule, Rfl: 1    lisinopril (PRINIVIL;ZESTRIL) 5 MG tablet, Take 1 tablet by mouth daily (Patient not taking: Reported on 4/19/2021), Disp: 90 tablet, Rfl: 1    empagliflozin (JARDIANCE) 10 MG tablet, Take 1 tablet by mouth daily (Patient not taking: Reported on 4/19/2021), Disp: 90 tablet, Rfl: 1    tamsulosin (FLOMAX) 0.4 MG capsule, Take 1 capsule by mouth daily Take one capsule daily (Patient not taking: Reported on 4/19/2021), Disp: 30 capsule, Rfl: 11    vitamin D (ERGOCALCIFEROL) 1.25 MG (09193 UT) CAPS capsule, Take 1 capsule by mouth once a week Take for 8 weeks. (Patient not taking: Reported on 4/19/2021), Disp: 8 capsule, Rfl: 0    blood glucose monitor kit and supplies, Accu Chek Sheila meter.  Use to test blood sugars DX:E11.65, Disp: 1 kit, Rfl: 0    ARIPiprazole (ABILIFY) 5 MG tablet, Take 5 mg by mouth daily , Disp: , Rfl:   fenofibrate (TRICOR) 145 MG tablet, 145 mg, Disp: , Rfl:     metoprolol tartrate (LOPRESSOR) 50 MG tablet, Take 1.5 tablets by mouth 2 times daily (Patient not taking: Reported on 4/19/2021), Disp: 90 tablet, Rfl: 3    pantoprazole (PROTONIX) 40 MG tablet, Take 40 mg by mouth daily, Disp: , Rfl:     The patient is allergic to pcn [penicillins]; actos [pioglitazone]; clindamycin/lincomycin; and vancomycin. Past Medical History  Aye Mccormack  has a past medical history of Bipolar 2 disorder ([address] BPH (benign prostatic hyperplasia), Diabetes mellitus type 2, uncontrolled ([address] Diabetic peripheral neuropathy ([address] Diabetic polyneuropathy ([address] Diabetic ulcer of right foot associated with type 2 diabetes mellitus ([address] Essential hypertension, GERD (gastroesophageal reflux disease), Hammer toe of left foot, Heart murmur, History of tobacco abuse, Hx of AKA (above knee amputation), right ([address] Macular edema, diabetic, bilateral ([address] Marijuana abuse in remission, Melena, MRSA (methicillin resistant staph aureus) culture positive, Onychomycosis, Other disorders of kidney and ureter in diseases classified elsewhere, Sleep apnea, Thyroid disease, and 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 Past Surgical History  The patient  has a past surgical history that includes other surgical history (Right, 1/14/15); Abscess Drainage (Right); Toe amputation (Right, 1/16/15); Foot Debridement (Right, 07/01/2016); Leg amputation below knee (Right, 07/20/2016); [place] tooth extraction (?when); pr drain infect shoulder bursa (Left, 8/18/2017); pr office/outpt visit,procedure only (Right, 9/20/2018); incision and drainage (Right, 2/18/2019); Leg amputation below knee (Right, 4/4/2019); AMPUTATION ABOVE KNEE (Right, 4/18/2019); Upper gastrointestinal endoscopy (N/A, 3/3/2020); Cholecystectomy, laparoscopic (N/A, 4/1/2020);  Endoscopy, colon, diagnostic; Cystoscopy (N/A, 7/20/2020); and Cystoscopy (N/A, 8/21/2020). Family History  This patient's family history includes Arthritis in his maternal grandfather; Depression in his mother; Diabetes in his mother; Early Death in his mother; Heart Disease in his maternal grandfather; High Blood Pressure in his mother; High Cholesterol in his mother; Other in his mother; Vision Loss in his maternal grandmother. Social History  Summer Dunham  reports that he quit smoking about 20 years ago. His smoking use included cigars. He started smoking about 36 years ago. He has a 10.00 pack-year smoking history. He has never used smokeless tobacco. He reports previous alcohol use. He reports that he does not use drugs. Health Maintenance:    Health Maintenance   Topic Date Due    Hepatitis B vaccine (1 of 3 - Risk 3-dose series) Never done    DTaP/Tdap/Td vaccine (1 - Tdap) Never done    Shingles Vaccine (1 of 2) Never done    Colon cancer screen colonoscopy  Never done    Diabetic retinal exam  05/09/2019    Annual Wellness Visit (AWV)  Never done    Diabetic microalbuminuria test  10/10/2019    Flu vaccine (Season Ended) 01/22/2022 (Originally 9/1/2021)    A1C test (Diabetic or Prediabetic)  06/29/2021    Diabetic foot exam  01/22/2022    Lipid screen  02/26/2022    TSH testing  02/26/2022    Potassium monitoring  04/24/2022    Creatinine monitoring  04/24/2022    Pneumococcal 0-64 years Vaccine  Completed    COVID-19 Vaccine  Completed    Hepatitis C screen  Completed    HIV screen  Completed    Hepatitis A vaccine  Aged Out    Hib vaccine  Aged Out    Meningococcal (ACWY) vaccine  Aged Out       Subjective:      Review of Systems   Constitutional: Negative for chills, fatigue and fever. HENT: Negative for sore throat and trouble swallowing. Eyes: Negative for visual disturbance. Respiratory: Negative for cough and shortness of breath. Cardiovascular: Negative for chest pain and leg swelling.    Gastrointestinal: Negative for abdominal pain, 04/24/2021    BUN 19 04/24/2021    CO2 24 04/24/2021    TSH 3.450 02/26/2021    PSA 0.78 03/24/2020    INR 1.04 12/26/2020    GLUF 341 (H) 10/08/2020    LABA1C 10.2 (H) 03/29/2021    LABMICR 1.21 10/10/2018       Assessment/Plan      1. Uncontrolled type 2 diabetes mellitus with hyperglycemia, with long-term current use of insulin (HCC)  Continues Lantus 60 units twice daily, Humalog 35 units with meals. Restart Jardiance 10 mg daily. Dexcom reader download next week on Monday for adjustments. Restart your other medications as ordered. - Basic Metabolic Panel; Future    2. Hypothyroidism, unspecified type  StableTFT's .  - levothyroxine (SYNTHROID) 50 MCG tablet; Take 1 tablet by mouth daily  Dispense: 90 tablet; Refill: 1  - TSH With Reflex Ft4; Future    3. Severe episode of recurrent major depressive disorder, without psychotic features ([facility]  - sertraline (ZOLOFT) 100 MG tablet; Take 1 tablet by mouth daily  Dispense: 30 tablet; Refill: 5    4. Essential hypertension  /70, improved on recheck. 5. Hyperlipidemia LDL goal <70  On Tricor. 6. Bipolar 1 disorder [facility])  Following with psychiatry at [facility]. Mood is stablized and much improved affect. 7. Above knee amputation of right lower extremity ([facility]  Seeing podiatry and physical med/rehab    8. Tachycardia  Stable with HR 95 on metoprolol, followed by Dr. Danni Devlin    9. Neuropathy  On Gabapentin 600 mg am, 300 mg PM      Return in about 6 weeks (around 5/31/2021) for Diabetes. Patient given educational materials - see patient instructions. Discussed use, benefit, and side effects of prescribed medications. All patient questions answered. Pt voiced understanding.      Electronically signed MARILYN Rausch CNP on 4/19/21 at 11:48 AM EDT

## 2021-04-24 ENCOUNTER — APPOINTMENT (OUTPATIENT)
Dept: CT IMAGING | Age: 51
End: 2021-04-24
Payer: MEDICARE

## 2021-04-24 ENCOUNTER — HOSPITAL ENCOUNTER (EMERGENCY)
Age: 51
Discharge: HOME | End: 2021-04-24
Payer: MEDICARE

## 2021-04-24 VITALS
OXYGEN SATURATION: 98 % | DIASTOLIC BLOOD PRESSURE: 76 MMHG | RESPIRATION RATE: 16 BRPM | HEART RATE: 95 BPM | SYSTOLIC BLOOD PRESSURE: 154 MMHG | BODY MASS INDEX: 29.54 KG/M2 | WEIGHT: 183 LBS | TEMPERATURE: 98.5 F

## 2021-04-24 DIAGNOSIS — E11.65 UNCONTROLLED TYPE 2 DIABETES MELLITUS WITH HYPERGLYCEMIA (HCC): ICD-10-CM

## 2021-04-24 DIAGNOSIS — L03.113 CELLULITIS OF RIGHT ELBOW: Primary | ICD-10-CM

## 2021-04-24 LAB
ALBUMIN SERPL-MCNC: 4 G/DL (ref 3.5–5.1)
ALP BLD-CCNC: 76 U/L (ref 38–126)
ALT SERPL-CCNC: 13 U/L (ref 11–66)
ANION GAP SERPL CALCULATED.3IONS-SCNC: 13 MEQ/L (ref 8–16)
AST SERPL-CCNC: 15 U/L (ref 5–40)
BASOPHILS # BLD: 0.6 %
BASOPHILS ABSOLUTE: 0.1 THOU/MM3 (ref 0–0.1)
BILIRUB SERPL-MCNC: 0.5 MG/DL (ref 0.3–1.2)
BUN BLDV-MCNC: 19 MG/DL (ref 7–22)
C-REACTIVE PROTEIN: 10.42 MG/DL (ref 0–1)
CALCIUM SERPL-MCNC: 9.6 MG/DL (ref 8.5–10.5)
CHLORIDE BLD-SCNC: 96 MEQ/L (ref 98–111)
CO2: 24 MEQ/L (ref 23–33)
CREAT SERPL-MCNC: 0.8 MG/DL (ref 0.4–1.2)
EOSINOPHIL # BLD: 3.4 %
EOSINOPHILS ABSOLUTE: 0.5 THOU/MM3 (ref 0–0.4)
ERYTHROCYTE [DISTWIDTH] IN BLOOD BY AUTOMATED COUNT: 13.7 % (ref 11.5–14.5)
ERYTHROCYTE [DISTWIDTH] IN BLOOD BY AUTOMATED COUNT: 44.9 FL (ref 35–45)
GFR SERPL CREATININE-BSD FRML MDRD: > 90 ML/MIN/1.73M2
GLUCOSE BLD-MCNC: 320 MG/DL (ref 70–108)
GLUCOSE BLD-MCNC: 379 MG/DL (ref 70–108)
HCT VFR BLD CALC: 43.5 % (ref 42–52)
HEMOGLOBIN: 14.6 GM/DL (ref 14–18)
IMMATURE GRANS (ABS): 0.12 THOU/MM3 (ref 0–0.07)
IMMATURE GRANULOCYTES: 0.9 %
LYMPHOCYTES # BLD: 13.8 %
LYMPHOCYTES ABSOLUTE: 1.9 THOU/MM3 (ref 1–4.8)
MCH RBC QN AUTO: 30.4 PG (ref 26–33)
MCHC RBC AUTO-ENTMCNC: 33.6 GM/DL (ref 32.2–35.5)
MCV RBC AUTO: 90.6 FL (ref 80–94)
MONOCYTES # BLD: 7.3 %
MONOCYTES ABSOLUTE: 1 THOU/MM3 (ref 0.4–1.3)
NUCLEATED RED BLOOD CELLS: 0 /100 WBC
OSMOLALITY CALCULATION: 284.2 MOSMOL/KG (ref 275–300)
PLATELET # BLD: 248 THOU/MM3 (ref 130–400)
PMV BLD AUTO: 9.4 FL (ref 9.4–12.4)
POTASSIUM SERPL-SCNC: 4.7 MEQ/L (ref 3.5–5.2)
PROCALCITONIN: 0.09 NG/ML (ref 0.01–0.09)
RBC # BLD: 4.8 MILL/MM3 (ref 4.7–6.1)
SEDIMENTATION RATE, ERYTHROCYTE: 83 MM/HR (ref 0–10)
SEG NEUTROPHILS: 74 %
SEGMENTED NEUTROPHILS ABSOLUTE COUNT: 10.4 THOU/MM3 (ref 1.8–7.7)
SODIUM BLD-SCNC: 133 MEQ/L (ref 135–145)
TOTAL PROTEIN: 8.1 G/DL (ref 6.1–8)
WBC # BLD: 14.1 THOU/MM3 (ref 4.8–10.8)

## 2021-04-24 PROCEDURE — 85651 RBC SED RATE NONAUTOMATED: CPT

## 2021-04-24 PROCEDURE — 6360000002 HC RX W HCPCS: Performed by: PHYSICIAN ASSISTANT

## 2021-04-24 PROCEDURE — 80053 COMPREHEN METABOLIC PANEL: CPT

## 2021-04-24 PROCEDURE — 82948 REAGENT STRIP/BLOOD GLUCOSE: CPT

## 2021-04-24 PROCEDURE — 96365 THER/PROPH/DIAG IV INF INIT: CPT

## 2021-04-24 PROCEDURE — 96375 TX/PRO/DX INJ NEW DRUG ADDON: CPT

## 2021-04-24 PROCEDURE — 86140 C-REACTIVE PROTEIN: CPT

## 2021-04-24 PROCEDURE — 6360000004 HC RX CONTRAST MEDICATION: Performed by: PHYSICIAN ASSISTANT

## 2021-04-24 PROCEDURE — 73201 CT UPPER EXTREMITY W/DYE: CPT

## 2021-04-24 PROCEDURE — 84145 PROCALCITONIN (PCT): CPT

## 2021-04-24 PROCEDURE — 2580000003 HC RX 258: Performed by: PHYSICIAN ASSISTANT

## 2021-04-24 PROCEDURE — 36415 COLL VENOUS BLD VENIPUNCTURE: CPT

## 2021-04-24 PROCEDURE — 85025 COMPLETE CBC W/AUTO DIFF WBC: CPT

## 2021-04-24 PROCEDURE — 99283 EMERGENCY DEPT VISIT LOW MDM: CPT

## 2021-04-24 PROCEDURE — 6370000000 HC RX 637 (ALT 250 FOR IP): Performed by: PHYSICIAN ASSISTANT

## 2021-04-24 RX ORDER — MORPHINE SULFATE 4 MG/ML
4 INJECTION, SOLUTION INTRAMUSCULAR; INTRAVENOUS ONCE
Status: COMPLETED | OUTPATIENT
Start: 2021-04-24 | End: 2021-04-24

## 2021-04-24 RX ORDER — KETOROLAC TROMETHAMINE 30 MG/ML
30 INJECTION, SOLUTION INTRAMUSCULAR; INTRAVENOUS ONCE
Status: COMPLETED | OUTPATIENT
Start: 2021-04-24 | End: 2021-04-24

## 2021-04-24 RX ORDER — SULFAMETHOXAZOLE AND TRIMETHOPRIM 800; 160 MG/1; MG/1
1 TABLET ORAL ONCE
Status: COMPLETED | OUTPATIENT
Start: 2021-04-24 | End: 2021-04-24

## 2021-04-24 RX ORDER — HYDROCODONE BITARTRATE AND ACETAMINOPHEN 5; 325 MG/1; MG/1
1 TABLET ORAL EVERY 6 HOURS PRN
Qty: 10 TABLET | Refills: 0 | Status: SHIPPED | OUTPATIENT
Start: 2021-04-24 | End: 2021-04-27

## 2021-04-24 RX ORDER — ONDANSETRON 2 MG/ML
4 INJECTION INTRAMUSCULAR; INTRAVENOUS ONCE
Status: COMPLETED | OUTPATIENT
Start: 2021-04-24 | End: 2021-04-24

## 2021-04-24 RX ORDER — CEPHALEXIN 500 MG/1
500 CAPSULE ORAL 4 TIMES DAILY
Qty: 40 CAPSULE | Refills: 0 | Status: SHIPPED | OUTPATIENT
Start: 2021-04-24 | End: 2021-05-04

## 2021-04-24 RX ORDER — 0.9 % SODIUM CHLORIDE 0.9 %
1000 INTRAVENOUS SOLUTION INTRAVENOUS ONCE
Status: COMPLETED | OUTPATIENT
Start: 2021-04-24 | End: 2021-04-24

## 2021-04-24 RX ORDER — SULFAMETHOXAZOLE AND TRIMETHOPRIM 800; 160 MG/1; MG/1
1 TABLET ORAL 2 TIMES DAILY
Qty: 20 TABLET | Refills: 0 | Status: SHIPPED | OUTPATIENT
Start: 2021-04-24 | End: 2021-05-04

## 2021-04-24 RX ADMIN — SODIUM CHLORIDE 1000 ML: 9 INJECTION, SOLUTION INTRAVENOUS at 10:20

## 2021-04-24 RX ADMIN — KETOROLAC TROMETHAMINE 30 MG: 30 INJECTION, SOLUTION INTRAMUSCULAR at 10:05

## 2021-04-24 RX ADMIN — SULFAMETHOXAZOLE AND TRIMETHOPRIM 1 TABLET: 800; 160 TABLET ORAL at 12:06

## 2021-04-24 RX ADMIN — ONDANSETRON 4 MG: 2 INJECTION INTRAMUSCULAR; INTRAVENOUS at 11:09

## 2021-04-24 RX ADMIN — CEFAZOLIN 2000 MG: 10 INJECTION, POWDER, FOR SOLUTION INTRAVENOUS at 12:06

## 2021-04-24 RX ADMIN — IOPAMIDOL 80 ML: 755 INJECTION, SOLUTION INTRAVENOUS at 11:29

## 2021-04-24 RX ADMIN — MORPHINE SULFATE 4 MG: 4 INJECTION, SOLUTION INTRAMUSCULAR; INTRAVENOUS at 11:10

## 2021-04-24 RX ADMIN — Medication 8 UNITS: at 11:02

## 2021-04-24 ASSESSMENT — ENCOUNTER SYMPTOMS
NAUSEA: 0
COUGH: 0
VOMITING: 0
ABDOMINAL PAIN: 0
BACK PAIN: 0
RHINORRHEA: 0
PHOTOPHOBIA: 0
SHORTNESS OF BREATH: 0
COLOR CHANGE: 1

## 2021-04-24 ASSESSMENT — PAIN SCALES - GENERAL
PAINLEVEL_OUTOF10: 10
PAINLEVEL_OUTOF10: 10

## 2021-04-24 ASSESSMENT — PAIN DESCRIPTION - PAIN TYPE: TYPE: ACUTE PAIN

## 2021-04-24 ASSESSMENT — PAIN DESCRIPTION - ORIENTATION: ORIENTATION: RIGHT

## 2021-04-24 NOTE — ED PROVIDER NOTES
[facility] EMERGENCY DEPT      CHIEF COMPLAINT       Chief Complaint   Patient presents with    Arm Pain     right       Nurses Notes reviewed and I agree except as noted in the HPI. HISTORY OF PRESENT ILLNESS    Carrie Morin is a 46 y.o. male who presents for right elbow pain. The patient states that he had carpal tunnel and cubital tunnel release 2 weeks ago on April 8, 2021 by Dr. John Parish at [facility]. The patient states that he had a follow up appointment last Thursday for removal of sutures and everything checked out well. Since that visit, the patient has been experiencing pain and swelling to that elbow. The pain is located from his mid triceps down to his hand. The pain is rated a 10/10 and is described as sharp with chronic numbness and tingling. The patient reports feeling chilled. He is right hand dominant and uses his arms to propel his wheelchair and this has become difficult and very painful to perform. The patient has a history of right above the knee amputation with reported complications. The patient takes Gabapentin for his diabetic neuropathy and this is not helping his pain. He also states he took an 800 mg ibuprofen yesterday with no relief. Location/Symptom: Right elbow pain and swelling  Timing/Onset: one week ago  Context/Setting: gradual worsening  Quality: sharp  Duration: constant  Modifying Factors: worse with movement  Severity: 10/10    REVIEW OF SYSTEMS     Review of Systems   Constitutional: Positive for chills. Negative for fever. HENT: Negative for rhinorrhea. Eyes: Negative for photophobia. Respiratory: Negative for cough and shortness of breath. Cardiovascular: Negative for chest pain. Gastrointestinal: Negative for abdominal pain, nausea and vomiting. Musculoskeletal: Positive for arthralgias and myalgias. Negative for back pain and neck pain. Skin: Positive for color change (erythema) and wound (surgical incision). Negative for rash.    Neurological: Positive for numbness. Negative for dizziness and weakness. Psychiatric/Behavioral: Negative for confusion. PAST MEDICAL HISTORY    has a past medical history of Bipolar 2 disorder ([address] BPH (benign prostatic hyperplasia), Diabetes mellitus type 2, uncontrolled ([address] Diabetic peripheral neuropathy ([address] Diabetic polyneuropathy ([address] Diabetic ulcer of right foot associated with type 2 diabetes mellitus ([address] Essential hypertension, GERD (gastroesophageal reflux disease), Hammer toe of left foot, Heart murmur, History of tobacco abuse, Hx of AKA (above knee amputation), right ([address] Macular edema, diabetic, bilateral ([address] Marijuana abuse in remission, Melena, MRSA (methicillin resistant staph aureus) culture positive, Onychomycosis, Other disorders of kidney and ureter in diseases classified elsewhere, Sleep apnea, Thyroid disease, and 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 SURGICAL HISTORY      has a past surgical history that includes other surgical history (Right, 1/14/15); Abscess Drainage (Right); Toe amputation (Right, 1/16/15); Foot Debridement (Right, 07/01/2016); Leg amputation below knee (Right, 07/20/2016); [place] tooth extraction (?when); pr drain infect shoulder bursa (Left, 8/18/2017); pr office/outpt visit,procedure only (Right, 9/20/2018); incision and drainage (Right, 2/18/2019); Leg amputation below knee (Right, 4/4/2019); AMPUTATION ABOVE KNEE (Right, 4/18/2019); Upper gastrointestinal endoscopy (N/A, 3/3/2020); Cholecystectomy, laparoscopic (N/A, 4/1/2020); Endoscopy, colon, diagnostic; Cystoscopy (N/A, 7/20/2020); and Cystoscopy (N/A, 8/21/2020).     CURRENT MEDICATIONS       Discharge Medication List as of 4/24/2021 12:06 PM      CONTINUE these medications which have NOT CHANGED    Details   gabapentin (NEURONTIN) 300 MG capsule 2 caps in am, 1 caps in pm, Disp-90 capsule, R-1Normal      Insulin Pen Needle (PEN NEEDLES) 31G X 8 MM MISC Disp-150 each, R-5, NormalFor use with insulin injections five timed per day dx:E11.9      sertraline (ZOLOFT) 100 MG tablet Take 1 tablet by mouth daily, Disp-30 tablet, R-5Normal      levothyroxine (SYNTHROID) 50 MCG tablet Take 1 tablet by mouth daily, Disp-90 tablet, R-1Normal      Insulin Glargine, 2 Unit Dial, 300 UNIT/ML SOPN Inject 90 Units into the skin 2 times daily, Disp-15 pen, R-2Normal      insulin lispro (HUMALOG KWIKPEN U-200) 200 UNIT/ML SOPN pen Inject 50 Units into the skin 3 times daily (with meals) Plus scale - Max dose 150 units per day, Disp-15 pen, R-2Normal      Continuous Blood Gluc Transmit (DEXCOM G6 TRANSMITTER) MISC 1 Device by Does not apply route every 3 months, Disp-1 each, R-3Normal      Continuous Blood Gluc Sensor (DEXCOM G6 SENSOR) MISC 1 Device by Does not apply route every 14 days, Disp-9 each, R-3Normal      Continuous Blood Gluc  (DEXCOM G6 ) LIANNE 1 Device by Does not apply route 4 times daily (before meals and nightly), Disp-1 Device, R-0Normal      lisinopril (PRINIVIL;ZESTRIL) 5 MG tablet Take 1 tablet by mouth daily, Disp-90 tablet, R-1Normal      empagliflozin (JARDIANCE) 10 MG tablet Take 1 tablet by mouth daily, Disp-90 tablet, R-1Normal      tamsulosin (FLOMAX) 0.4 MG capsule Take 1 capsule by mouth daily Take one capsule daily, Disp-30 capsule, R-11Normal      vitamin D (ERGOCALCIFEROL) 1.25 MG (18615 UT) CAPS capsule Take 1 capsule by mouth once a week Take for 8 weeks. , Disp-8 capsule, R-0Normal      blood glucose monitor kit and supplies Accu Chek Sheila meter.  Use to test blood sugars DX:E11.65, Disp-1 kit, R-0, Normal      ARIPiprazole (ABILIFY) 5 MG tablet Take 5 mg by mouth daily Historical Med      fenofibrate (TRICOR) 145 MG tablet 145 mgHistorical Med      metoprolol tartrate (LOPRESSOR) 50 MG tablet Take 1.5 tablets by mouth 2 times daily, Disp-90 tablet,R-3Normal      pantoprazole (PROTONIX) 40 MG tablet Take 40 mg by mouth dailyHistorical Med      Lancets MISC Disp-400 each,R-5, NormalUse to test blood sugars 4 times daily DX:E11.65      Insulin Syringe-Needle U-100 29G X 1/2\" 0.3 ML MISC 4 TIMES DAILY AFTER MEALS AND BEFORE BEDTIME Starting 2019, Disp-200 each, R-0, Normal             ALLERGIES     is allergic to pcn [penicillins]; actos [pioglitazone]; clindamycin/lincomycin; and vancomycin. FAMILY HISTORY     He indicated that his mother is . He reported the following about his father: unknown. He indicated that the status of his maternal grandmother is unknown. He indicated that the status of his maternal grandfather is unknown.   family history includes Arthritis in his maternal grandfather; Depression in his mother; Diabetes in his mother; Early Death in his mother; Heart Disease in his maternal grandfather; High Blood Pressure in his mother; High Cholesterol in his mother; Other in his mother; Vision Loss in his maternal grandmother. SOCIAL HISTORY    reports that he quit smoking about 20 years ago. His smoking use included cigars. He started smoking about 36 years ago. He has a 10.00 pack-year smoking history. He has never used smokeless tobacco. He reports previous alcohol use. He reports that he does not use drugs. PHYSICAL EXAM     INITIAL VITALS:  weight is 183 lb (83 kg). His oral temperature is 98.5 °F (36.9 °C). His blood pressure is 154/76 (abnormal) and his pulse is 95. His respiration is 16 and oxygen saturation is 98%. Physical Exam  Vitals signs and nursing note reviewed. Constitutional:       General: He is not in acute distress. Appearance: Normal appearance. He is well-developed. He is not toxic-appearing or diaphoretic. HENT:      Head: Normocephalic and atraumatic. Right Ear: Hearing normal.      Left Ear: Hearing normal.      Nose: Nose normal.   Eyes:      General: Lids are normal.      Conjunctiva/sclera: Conjunctivae normal.   Neck:      Musculoskeletal: No neck rigidity.       Trachea: No tracheal deviation. Cardiovascular:      Rate and Rhythm: Normal rate and regular rhythm. Pulses:           Radial pulses are 2+ on the right side and 2+ on the left side. Heart sounds: Normal heart sounds. Pulmonary:      Effort: Pulmonary effort is normal. No tachypnea or respiratory distress. Breath sounds: No stridor. Abdominal:      General: There is no distension. Palpations: Abdomen is soft. Musculoskeletal:      Right elbow: He exhibits decreased range of motion and swelling. Comments: Passive ROM to flexion limited to 90 degrees before pain. Able to perform full extension but increases pain. Swelling, warmth, and mild erythema to right elbow. 3cm surgical incision with mild surrounding erythema. No drainage from incision. Skin:     General: Skin is warm and dry. Coloration: Skin is not pale. Findings: Erythema (right elbow) present. No abrasion, ecchymosis or rash. Neurological:      Mental Status: He is alert and oriented to person, place, and time. GCS: GCS eye subscore is 4. GCS verbal subscore is 5. GCS motor subscore is 6. Sensory: No sensory deficit. Gait: Gait normal.   Psychiatric:         Speech: Speech normal.         Behavior: Behavior normal.         Thought Content: Thought content normal.         DIFFERENTIAL DIAGNOSIS:   Including but not limited to: cellulitis, abscess, septic joint, osteomyelitis, fracture. DIAGNOSTIC RESULTS     EKG: All EKG's are interpreted by the Emergency Department Physician who either signs or Co-signs this chart in the absence of a cardiologist.  None    RADIOLOGY: non-plain film images(s) such as CT,Ultrasound and MRI are read by the radiologist.  Plain radiographic images are visualized and preliminarily interpreted by the emergency physician unless otherwise stated below. CT ELBOW RIGHT W CONTRAST   Final Result   1.  Increased density in the skin and subcutaneous soft tissues circumferentially surrounding the proximal ulna and radius and overlying the distal humerus consistent with edema and/or cellulitis. No drainable fluid collection. 2. No definite evidence of osteomyelitis  or fracture. 3. No significant joint effusion or evidence of intra-articular loose bodies. Final report electronically signed by DR Erik Penaloza on 4/24/2021 11:55 AM        LABS:   Labs Reviewed   CBC WITH AUTO DIFFERENTIAL - Abnormal; Notable for the following components:       Result Value    WBC 14.1 (*)     Segs Absolute 10.4 (*)     Eosinophils Absolute 0.5 (*)     Immature Grans (Abs) 0.12 (*)     All other components within normal limits   COMPREHENSIVE METABOLIC PANEL - Abnormal; Notable for the following components:    Glucose 379 (*)     Sodium 133 (*)     Chloride 96 (*)     Total Protein 8.1 (*)     All other components within normal limits   C-REACTIVE PROTEIN - Abnormal; Notable for the following components:    CRP 10.42 (*)     All other components within normal limits   SEDIMENTATION RATE - Abnormal; Notable for the following components:    Sed Rate 83 (*)     All other components within normal limits   POCT GLUCOSE - Abnormal; Notable for the following components:    POC Glucose 320 (*)     All other components within normal limits   PROCALCITONIN   ANION GAP   GLOMERULAR FILTRATION RATE, ESTIMATED   OSMOLALITY   POCT GLUCOSE   POCT GLUCOSE       EMERGENCY DEPARTMENT COURSE:   Vitals:    Vitals:    04/24/21 0905 04/24/21 1112   BP: (!) 158/88 (!) 154/76   Pulse: 105 95   Resp: 18 16   Temp: 98.5 °F (36.9 °C)    TempSrc: Oral    SpO2: 97% 98%   Weight: 183 lb (83 kg)      1200: Consulted Mercedes Lopez PA-C who advised follow-up with Dr. Bronwyn Canavan on Monday    MDM:  The patient was seen and evaluated within the ED today for right elbow pain following carpal tunnel and cubital tunnel release on April 8th. On exam, I appreciated erythema, swelling, and warmth to the right elbow concerning for cellulitis.  Within the department, with hyperglycemia ([facility] [address]        PATIENT REFERRED TO:  Kenneth Meeks MD  [address]  [phone]    Schedule an appointment as soon as possible for a visit         DISCHARGE MEDICATIONS:  Discharge Medication List as of 4/24/2021 12:06 PM      START taking these medications    Details   cephALEXin (KEFLEX) 500 MG capsule Take 1 capsule by mouth 4 times daily for 10 days, Disp-40 capsule, R-0Print      sulfamethoxazole-trimethoprim (BACTRIM DS) 800-160 MG per tablet Take 1 tablet by mouth 2 times daily for 10 days, Disp-20 tablet, R-0Print      HYDROcodone-acetaminophen (NORCO) 5-325 MG per tablet Take 1 tablet by mouth every 6 hours as needed for Pain for up to 3 days. Intended supply: 3 days.  Take lowest dose possible to manage pain, Disp-10 tablet, R-0Print             (Please note that portions of this note were completed with a voice recognition program.  Efforts were made to edit the dictations but occasionally words are mis-transcribed.)    Trina Douglas PA-C 04/25/21 8:31 AM    CATHI Ashraf PA-C  04/25/21 0852

## 2021-04-24 NOTE — ED NOTES
Patient presents to the ED with complaints of right arm pain. States he had surgery 3 weeks ago and the pain is still 10/10.      Ashish Gardner  04/24/21 2629

## 2021-04-24 NOTE — ED NOTES
IV placed by [place] LPN. Pt medicated per order. Lab at bedside for blood draw. Pt updated on poc. He was given po fluids per Mamta WHITE ok. Pt denies other needs will cont to monitor.       Yesica Garcia RN  04/24/21 1038

## 2021-04-26 ASSESSMENT — ENCOUNTER SYMPTOMS
TROUBLE SWALLOWING: 0
SORE THROAT: 0
VOMITING: 0
CONSTIPATION: 0
NAUSEA: 0
SHORTNESS OF BREATH: 0
COUGH: 0
ABDOMINAL PAIN: 0
DIARRHEA: 0

## 2021-04-27 ENCOUNTER — TELEPHONE (OUTPATIENT)
Dept: INTERNAL MEDICINE CLINIC | Age: 51
End: 2021-04-27

## 2021-05-12 ENCOUNTER — APPOINTMENT (OUTPATIENT)
Dept: GENERAL RADIOLOGY | Age: 51
End: 2021-05-12
Payer: MEDICARE

## 2021-05-12 ENCOUNTER — HOSPITAL ENCOUNTER (EMERGENCY)
Age: 51
Discharge: HOME | End: 2021-05-12
Attending: EMERGENCY MEDICINE
Payer: MEDICARE

## 2021-05-12 VITALS
TEMPERATURE: 97.9 F | HEART RATE: 105 BPM | WEIGHT: 199 LBS | OXYGEN SATURATION: 99 % | DIASTOLIC BLOOD PRESSURE: 69 MMHG | HEIGHT: 66 IN | SYSTOLIC BLOOD PRESSURE: 143 MMHG | RESPIRATION RATE: 16 BRPM | BODY MASS INDEX: 31.98 KG/M2

## 2021-05-12 DIAGNOSIS — R73.9 HYPERGLYCEMIA WITHOUT KETOSIS: ICD-10-CM

## 2021-05-12 DIAGNOSIS — S93.402A SPRAIN OF LEFT ANKLE, UNSPECIFIED LIGAMENT, INITIAL ENCOUNTER: Primary | ICD-10-CM

## 2021-05-12 LAB
ALBUMIN SERPL-MCNC: 3.9 G/DL (ref 3.5–5.1)
ALP BLD-CCNC: 66 U/L (ref 38–126)
ALT SERPL-CCNC: 15 U/L (ref 11–66)
ANION GAP SERPL CALCULATED.3IONS-SCNC: 15 MEQ/L (ref 8–16)
AST SERPL-CCNC: 22 U/L (ref 5–40)
BASOPHILS # BLD: 0.5 %
BASOPHILS ABSOLUTE: 0.1 THOU/MM3 (ref 0–0.1)
BILIRUB SERPL-MCNC: 0.2 MG/DL (ref 0.3–1.2)
BILIRUBIN URINE: NEGATIVE
BLOOD, URINE: NEGATIVE
BUN BLDV-MCNC: 18 MG/DL (ref 7–22)
CALCIUM SERPL-MCNC: 9.5 MG/DL (ref 8.5–10.5)
CHARACTER, URINE: CLEAR
CHLORIDE BLD-SCNC: 95 MEQ/L (ref 98–111)
CO2: 25 MEQ/L (ref 23–33)
COLOR: YELLOW
CREAT SERPL-MCNC: 0.8 MG/DL (ref 0.4–1.2)
EOSINOPHIL # BLD: 2.5 %
EOSINOPHILS ABSOLUTE: 0.3 THOU/MM3 (ref 0–0.4)
ERYTHROCYTE [DISTWIDTH] IN BLOOD BY AUTOMATED COUNT: 13.9 % (ref 11.5–14.5)
ERYTHROCYTE [DISTWIDTH] IN BLOOD BY AUTOMATED COUNT: 45.9 FL (ref 35–45)
GFR SERPL CREATININE-BSD FRML MDRD: > 90 ML/MIN/1.73M2
GLUCOSE BLD-MCNC: 457 MG/DL (ref 70–108)
GLUCOSE BLD-MCNC: 465 MG/DL (ref 70–108)
GLUCOSE, URINE: >= 1000 MG/DL
HCT VFR BLD CALC: 45.2 % (ref 42–52)
HEMOGLOBIN: 15.3 GM/DL (ref 14–18)
IMMATURE GRANS (ABS): 0.05 THOU/MM3 (ref 0–0.07)
IMMATURE GRANULOCYTES: 0.4 %
KETONES, URINE: NEGATIVE
LEUKOCYTE EST, POC: NEGATIVE
LYMPHOCYTES # BLD: 24.7 %
LYMPHOCYTES ABSOLUTE: 2.8 THOU/MM3 (ref 1–4.8)
MCH RBC QN AUTO: 30.5 PG (ref 26–33)
MCHC RBC AUTO-ENTMCNC: 33.8 GM/DL (ref 32.2–35.5)
MCV RBC AUTO: 90.2 FL (ref 80–94)
MONOCYTES # BLD: 7.3 %
MONOCYTES ABSOLUTE: 0.8 THOU/MM3 (ref 0.4–1.3)
NITRITE, URINE: NEGATIVE
NUCLEATED RED BLOOD CELLS: 0 /100 WBC
OSMOLALITY CALCULATION: 292.4 MOSMOL/KG (ref 275–300)
PH UA: 6 (ref 5–9)
PLATELET # BLD: 275 THOU/MM3 (ref 130–400)
PMV BLD AUTO: 9.9 FL (ref 9.4–12.4)
POTASSIUM REFLEX MAGNESIUM: 4.5 MEQ/L (ref 3.5–5.2)
PROTEIN UA: NEGATIVE MG/DL
RBC # BLD: 5.01 MILL/MM3 (ref 4.7–6.1)
SEG NEUTROPHILS: 64.6 %
SEGMENTED NEUTROPHILS ABSOLUTE COUNT: 7.3 THOU/MM3 (ref 1.8–7.7)
SODIUM BLD-SCNC: 135 MEQ/L (ref 135–145)
SPECIFIC GRAVITY UA: > 1.03 (ref 1–1.03)
TOTAL PROTEIN: 7.5 G/DL (ref 6.1–8)
UROBILINOGEN, URINE: 0.2 EU/DL (ref 0–1)
WBC # BLD: 11.3 THOU/MM3 (ref 4.8–10.8)

## 2021-05-12 PROCEDURE — 96375 TX/PRO/DX INJ NEW DRUG ADDON: CPT

## 2021-05-12 PROCEDURE — 81003 URINALYSIS AUTO W/O SCOPE: CPT

## 2021-05-12 PROCEDURE — 73610 X-RAY EXAM OF ANKLE: CPT

## 2021-05-12 PROCEDURE — 6360000002 HC RX W HCPCS: Performed by: EMERGENCY MEDICINE

## 2021-05-12 PROCEDURE — 96374 THER/PROPH/DIAG INJ IV PUSH: CPT

## 2021-05-12 PROCEDURE — 99283 EMERGENCY DEPT VISIT LOW MDM: CPT

## 2021-05-12 PROCEDURE — 80053 COMPREHEN METABOLIC PANEL: CPT

## 2021-05-12 PROCEDURE — 6370000000 HC RX 637 (ALT 250 FOR IP): Performed by: EMERGENCY MEDICINE

## 2021-05-12 PROCEDURE — 85025 COMPLETE CBC W/AUTO DIFF WBC: CPT

## 2021-05-12 PROCEDURE — 96361 HYDRATE IV INFUSION ADD-ON: CPT

## 2021-05-12 PROCEDURE — 2580000003 HC RX 258: Performed by: EMERGENCY MEDICINE

## 2021-05-12 PROCEDURE — 82948 REAGENT STRIP/BLOOD GLUCOSE: CPT

## 2021-05-12 PROCEDURE — 36415 COLL VENOUS BLD VENIPUNCTURE: CPT

## 2021-05-12 RX ORDER — KETOROLAC TROMETHAMINE 30 MG/ML
15 INJECTION, SOLUTION INTRAMUSCULAR; INTRAVENOUS ONCE
Status: COMPLETED | OUTPATIENT
Start: 2021-05-12 | End: 2021-05-12

## 2021-05-12 RX ORDER — 0.9 % SODIUM CHLORIDE 0.9 %
1000 INTRAVENOUS SOLUTION INTRAVENOUS ONCE
Status: COMPLETED | OUTPATIENT
Start: 2021-05-12 | End: 2021-05-12

## 2021-05-12 RX ADMIN — KETOROLAC TROMETHAMINE 15 MG: 30 INJECTION, SOLUTION INTRAMUSCULAR; INTRAVENOUS at 20:19

## 2021-05-12 RX ADMIN — SODIUM CHLORIDE 1000 ML: 9 INJECTION, SOLUTION INTRAVENOUS at 19:34

## 2021-05-12 RX ADMIN — Medication 10 UNITS: at 21:36

## 2021-05-12 ASSESSMENT — PAIN DESCRIPTION - PAIN TYPE: TYPE: ACUTE PAIN

## 2021-05-12 ASSESSMENT — PAIN DESCRIPTION - DESCRIPTORS: DESCRIPTORS: THROBBING

## 2021-05-12 ASSESSMENT — PAIN SCALES - GENERAL: PAINLEVEL_OUTOF10: 10

## 2021-05-12 NOTE — ED TRIAGE NOTES
Pt presents to the ED from home c/c left leg pain, right hand pain, and palpitations. Patient is a diabetic with a right LL amputation. Patient noticed redness of the left leg about 2 days ago with puss, where the redness has changed and gotten worse. Patient complains of right hand pain; he had carpal tunnel surgery 3-4 weeks ago. Patient states he has palpitations often, but has not been seen for them. Patient POCT 457. IV access established. Labs collected. EKG complete.

## 2021-05-13 ASSESSMENT — ENCOUNTER SYMPTOMS
VOMITING: 0
ABDOMINAL PAIN: 0
TROUBLE SWALLOWING: 0
COUGH: 0
NAUSEA: 0
COLOR CHANGE: 1
BACK PAIN: 0
SHORTNESS OF BREATH: 0
EYE REDNESS: 0

## 2021-05-13 NOTE — ED PROVIDER NOTES
[facility] [address] [address] EMERGENCY DEPT    EMERGENCY MEDICINE     Pt Name: Wendy Rico  MRN: [id number]  [place] 1970  Date of evaluation: 5/12/2021  Provider: Katina Dewitt MD,     [address] [place] [address]       Chief Complaint   Patient presents with    Leg Pain     Left    Palpitations    Hand Pain     Right       HISTORY OF PRESENT ILLNESS    Wendy Rico is a pleasant 46 y.o. male who presents to the emergency department from home with complaints of left leg pain. Patient states that his left ankle and foot seem to be pulled in a funny direction. He is also worried about the discoloration of the leg along with some wounds. He says the wounds have been chronic but his leg appears more purple than normal.  He denies feeling any significant discomfort because he has neuropathy however he does state that it feels more tight than normal.  He denies any recent injury. He also mentions that his right wrist is hurting him. He had carpal tunnel surgery a few weeks ago. He did follow-up with [facility] and they told him it would just take time to heal.  Denies any recent fever, chills, nausea, vomiting, diarrhea, or abdominal pain. Triage notes and Nursing notes were reviewed by myself. Any discrepancies are addressed above. PAST MEDICAL HISTORY     Past Medical History:   Diagnosis Date    Bipolar 2 disorder [facility])     previously followed with Dr. Virginia Dominguez and Sarita Camacho in [facility] BPH (benign prostatic hyperplasia)     Diabetes mellitus type 2, uncontrolled ([address]     HgbA1c on 4/2/2019 was 9.1.     Diabetic peripheral neuropathy ([address]     Diabetic polyneuropathy ([address]     Diabetic ulcer of right foot associated with type 2 diabetes mellitus ([address] 12/10/2015    Essential hypertension     \"never been on b/p medication that I know of\"    GERD (gastroesophageal reflux disease)     Hammer toe of left foot     Heart murmur     denies any chest pain or palpitations    History of tobacco abuse  Hx of AKA (above knee amputation), right ([facility] 2019    Dr. Braxton Noss edema, diabetic, bilateral ([facility] 2018    Dr. Sofia Johnson referred to retina specialist for 2nd opinion    Marijuana abuse in remission     Melena     MRSA (methicillin resistant staph aureus) culture positive     Onychomycosis     Other disorders of kidney and ureter in diseases classified elsewhere     Sleep apnea     no cpap    Thyroid disease     WD-Skin ulcer of fourth toe of right foot with necrosis of bone ([facility] 2016       SURGICAL HISTORY       Past Surgical History:   Procedure Laterality Date    ABSCESS DRAINAGE Right     foot    AMPUTATION ABOVE KNEE Right 2019    RIGHT ABOVE KNEE AMPUTATION performed by Nestor Hodgson MD at [address] [facility], LAPAROSCOPIC N/A 2020    ROBOTIC CHOLECYSTECTOMY performed by Yonas Key MD at [address] [place] [address] N/A 2020    CYSTOSCOPY performed by Ciera Pineda MD at [address] [facility] [place] [facility]    Mani Ledezma performed by Ciera Pineda MD at [address] [facility], DIAGNOSTIC      FOOT DEBRIDEMENT Right 2016    I & D    INCISION AND DRAINAGE Right 2019    I&D RIGHT STUMP performed by Nestor Hodgson MD at [address] [facility] Right 2016    LEG AMPUTATION BELOW KNEE Right 2019    I&D AND REVISION OF AMPUTATION RIGHT LEG performed by Nestor Hodgson MD at [address] [facility] Right 1/14/15    sole of foot I&D    MD DRAIN INFECT SHOULDER BURSA Left 2017    LEFT SHOULDER INCISION AND DRAINAGE performed by Nestor Hodgson MD at [address] OFFICE/OUTPT [address] [facility] Right 2018    EXCISIONAL DEBRIDEMENT RIGHT BKA STUMP performed by Ilir Galaviz MD at [address] Right 1/16/15    2nd toe with wound vac applied    UPPER GASTROINTESTINAL ENDOSCOPY N/A 3/3/2020    EGD DILATION SAVORY performed by Honey Plaza MD at [address] [facility]    WISDOM TOOTH EXTRACTION  ? when       CURRENT MEDICATIONS       Discharge Medication List as of 5/12/2021  9:33 PM      CONTINUE these medications which have NOT CHANGED    Details   gabapentin (NEURONTIN) 300 MG capsule 2 caps in am, 1 caps in pm, Disp-90 capsule, R-1Normal      Insulin Pen Needle (PEN NEEDLES) 31G X 8 MM MISC Disp-150 each, R-5, NormalFor use with insulin injections five timed per day dx:E11.9      sertraline (ZOLOFT) 100 MG tablet Take 1 tablet by mouth daily, Disp-30 tablet, R-5Normal      levothyroxine (SYNTHROID) 50 MCG tablet Take 1 tablet by mouth daily, Disp-90 tablet, R-1Normal      Insulin Glargine, 2 Unit Dial, 300 UNIT/ML SOPN Inject 90 Units into the skin 2 times daily, Disp-15 pen, R-2Normal      insulin lispro (HUMALOG KWIKPEN U-200) 200 UNIT/ML SOPN pen Inject 50 Units into the skin 3 times daily (with meals) Plus scale - Max dose 150 units per day, Disp-15 pen, R-2Normal      Continuous Blood Gluc Transmit (DEXCOM G6 TRANSMITTER) MISC 1 Device by Does not apply route every 3 months, Disp-1 each, R-3Normal      Continuous Blood Gluc Sensor (DEXCOM G6 SENSOR) MISC 1 Device by Does not apply route every 14 days, Disp-9 each, R-3Normal      Continuous Blood Gluc  (DEXCOM G6 ) LIANNE 1 Device by Does not apply route 4 times daily (before meals and nightly), Disp-1 Device, R-0Normal      lisinopril (PRINIVIL;ZESTRIL) 5 MG tablet Take 1 tablet by mouth daily, Disp-90 tablet, R-1Normal      empagliflozin (JARDIANCE) 10 MG tablet Take 1 tablet by mouth daily, Disp-90 tablet, R-1Normal      tamsulosin (FLOMAX) 0.4 MG capsule Take 1 capsule by mouth daily Take one capsule daily, Disp-30 capsule, R-11Normal      vitamin D (ERGOCALCIFEROL) 1.25 MG (94123 UT) CAPS capsule Take 1 capsule by mouth once a week Take for 8 weeks. , Disp-8 capsule, R-0Normal      blood glucose monitor kit and supplies Accu Chek Sheila meter.  Use to test blood sugars DX:E11.65, Disp-1 kit, R-0, Normal      ARIPiprazole (ABILIFY) 5 MG tablet Take 5 mg by mouth daily Historical Med      fenofibrate (TRICOR) 145 MG tablet 145 mgHistorical Med      metoprolol tartrate (LOPRESSOR) 50 MG tablet Take 1.5 tablets by mouth 2 times daily, Disp-90 tablet,R-3Normal      pantoprazole (PROTONIX) 40 MG tablet Take 40 mg by mouth dailyHistorical Med      Lancets MISC Disp-400 each,R-5, NormalUse to test blood sugars 4 times daily DX:E11.65      Insulin Syringe-Needle U-100 29G X 1/2\" 0.3 ML MISC 4 TIMES DAILY AFTER MEALS AND BEFORE BEDTIME Starting 2019, Disp-200 each, R-0, Normal             ALLERGIES     Pcn [penicillins], Actos [pioglitazone], Clindamycin/lincomycin, and Vancomycin    FAMILY HISTORY       Family History   Problem Relation Age of Onset    Diabetes Mother     Other Mother         pneumonia, H1N1    Depression Mother     Early Death Mother     High Blood Pressure Mother     High Cholesterol Mother     Vision Loss Maternal Grandmother     Arthritis Maternal Grandfather     Heart Disease Maternal Grandfather         SOCIAL HISTORY       Social History     Socioeconomic History    Marital status:      Spouse name: None    Number of children: 2    Years of education: 15    Highest education level: None   Occupational History    None   Social Needs    Financial resource strain: None    Food insecurity     Worry: None     Inability: None    Transportation needs     Medical: None     Non-medical: None   Tobacco Use    Smoking status: Former Smoker     Packs/day: 1.00     Years: 10.00     Pack years: 10.00     Types: Cigars     Start date: 1985     Quit date: 2000     Years since quittin.7    Smokeless tobacco: Never Used   Substance and Sexual Activity    Alcohol use: Not Currently     Alcohol/week: 0.0 standard drinks    Drug use: No     Comment: 30 YEARS AGO    Sexual activity: Yes     Partners: Female   Lifestyle    Physical activity     Days per week: None     Minutes per session: None    Stress: None   Relationships    Social connections     Talks on phone: None     Gets together: None     Attends Judaism service: None     Active member of club or organization: None     Attends meetings of clubs or organizations: None     Relationship status: None    Intimate partner violence     Fear of current or ex partner: None     Emotionally abused: None     Physically abused: None     Forced sexual activity: None   Other Topics Concern    None   Social History Narrative    None       REVIEW OF SYSTEMS     Review of Systems   Constitutional: Negative for fatigue and fever. HENT: Negative for congestion and trouble swallowing. Eyes: Negative for redness. Respiratory: Negative for cough and shortness of breath. Cardiovascular: Negative for chest pain. Gastrointestinal: Negative for abdominal pain, nausea and vomiting. Genitourinary: Negative for difficulty urinating. Musculoskeletal: Positive for arthralgias and joint swelling. Negative for back pain. Skin: Positive for color change. Negative for rash. Allergic/Immunologic: Negative for immunocompromised state. Neurological: Negative for light-headedness and headaches. Hematological: Does not bruise/bleed easily. Except as noted above the remainder of the review of systems was reviewed and is. PHYSICAL EXAM    (up to 7 for level 4, 8 or more for level 5)     ED Triage Vitals [05/12/21 1810]   BP Temp Temp Source Pulse Resp SpO2 Height Weight   129/73 97.9 °F (36.6 °C) Oral 111 18 98 % 5' 6\" (1.676 m) 199 lb (90.3 kg)       Physical Exam  Vitals signs and nursing note reviewed. Constitutional:       General: He is not in acute distress. Appearance: He is normal weight. He is not ill-appearing. HENT:      Head: Normocephalic and atraumatic. Mouth/Throat:      Mouth: Mucous membranes are moist.      Pharynx: Oropharynx is clear.    Eyes:      Extraocular Movements: Extraocular movements intact. Pupils: Pupils are equal, round, and reactive to light. Neck:      Musculoskeletal: Neck supple. Cardiovascular:      Rate and Rhythm: Normal rate and regular rhythm. Heart sounds: No murmur. Pulmonary:      Effort: Pulmonary effort is normal. No respiratory distress. Breath sounds: Normal breath sounds. No decreased breath sounds. Abdominal:      General: Bowel sounds are normal.      Palpations: Abdomen is soft. Tenderness: There is no abdominal tenderness. There is no guarding or rebound. Musculoskeletal:      Left ankle: He exhibits swelling. He exhibits normal range of motion, no ecchymosis, no deformity, no laceration and normal pulse. Tenderness. Lateral malleolus and medial malleolus tenderness found. Achilles tendon exhibits no pain and normal Carbajal's test results. Right lower leg: No edema. Left lower leg: No edema. Comments: Amputated BKA on the right. Left lower leg has some old chronic scabs with no evidence of erythema, induration, or fluctuance. Skin:     General: Skin is warm and dry. Capillary Refill: Capillary refill takes less than 2 seconds. Comments: Left lower extremity has some old scabs and wounds that are well-healing. There is significant area of dirt on the left ankle and shin. Neurological:      General: No focal deficit present. Mental Status: He is alert. DIAGNOSTIC RESULTS     EKG:(none if blank)  All EKG's are [facility] who either signs or Co-signs this chart in the absence of a cardiologist.        RADIOLOGY: (none if blank)   Interpretation per the Radiologistbelow, if available at the time of this note:    XR ANKLE LEFT (MIN 3 VIEWS)   Final Result   Impression:   No acute fracture or dislocation. Widening of the lateral aspect of the    tibiotalar joint suggesting ligamentous injury.    Stable calcific tendinopathy of the distal Achilles tendon and moderate    size calcaneal plantar insertion spur. This document has been electronically signed by: Skylar Wise MD on    05/12/2021 08:14 PM          LABS:  Labs Reviewed   CBC WITH AUTO DIFFERENTIAL - Abnormal; Notable for the following components:       Result Value    WBC 11.3 (*)     RDW-SD 45.9 (*)     All other components within normal limits   COMPREHENSIVE METABOLIC PANEL W/ REFLEX TO MG FOR LOW K - Abnormal; Notable for the following components:    Glucose 465 (*)     Chloride 95 (*)     Total Bilirubin 0.2 (*)     All other components within normal limits   URINALYSIS - Abnormal; Notable for the following components:    Glucose, Urine >= 1000 (*)     Specific Gravity, UA >1.030 (*)     All other components within normal limits   POCT GLUCOSE - Abnormal; Notable for the following components:    POC Glucose 457 (*)     All other components within normal limits   ANION GAP   GLOMERULAR FILTRATION RATE, ESTIMATED   OSMOLALITY       All other labs were within normal range or not returned as of this dictation. Please note, any cultures that may have been sent were not resulted at the time of this patient visit. EMERGENCY DEPARTMENT COURSE andMedical Decision Making:     MDM  Number of Diagnoses or Management Options  Hyperglycemia without ketosis  Sprain of left ankle, unspecified ligament, initial encounter  Diagnosis management comments: 47-year-old male presents emergency room with complaints of left ankle and lower leg discoloration and discomfort. The discoloration was improved after I wash the dirt off of his lower leg. He did have some tenderness over the lateral and medial malleolus. Will obtain an x-ray to rule out any occult fracture. Evaluation of his wrist.  Normal with a well-healing scar from his carpal tunnel surgery. Will evaluate with CBC, CMP, x-ray. Labs to evaluate if there is any sort of electrolyte abnormality or signs of infection.   /  ED Course as of May 13 5936   Wed May 12, 2021   2131 No evidence of infection based on labs. He does have some hyperglycemia. Will give a dose of insulin here. He was also given IV hydration which should help with his elevated glucose levels. X-ray is consistent with a sprain. Aircast has been provided for him. [DD]      ED Course User Index  [DD] Demarcus Dominguez MD         The patient was evaluated during the global COVID-19 pandemic, and that diagnosis was considered upon their initial presentation. Their evaluation, treatment and testing was consistent with current guidelines for patients who present with complaints or symptoms that may be related to COVID-19. Strict returnprecautions and follow up instructions were discussed with the patient with which the patient agrees        ED Medications administered this visit:    Medications   0.9 % sodium chloride bolus (0 mLs Intravenous Stopped 5/12/21 2034)   ketorolac (TORADOL) injection 15 mg (15 mg Intravenous Given 5/12/21 2019)   insulin regular (HUMULIN R;NOVOLIN R) injection 10 Units (10 Units Intravenous Given 5/12/21 2136)         Procedures: (None if blank)       CLINICAL       1. Sprain of left ankle, unspecified ligament, initial encounter    2.  Hyperglycemia without ketosis          DISPOSITION/PLAN   DISPOSITION Decision To Discharge 05/12/2021 09:32:27 PM      PATIENT REFERRED TO:  Randolph Welsh MD  [place] [address]  [phone]    Schedule an appointment as soon as possible for a visit   If symptoms worsen      DISCHARGE MEDICATIONS:  Discharge Medication List as of 5/12/2021  9:33 PM                 (Please note that portions of this note were completed with a voice recognition program.  Efforts were made to edit the dictations but occasionallywords are mis-transcribed.)      Electronically signed by Quang Madrid MD on 5/12/21 at 9:31 PM EDT    Attending Physician, Emergency Department       Demarcus Dominguez MD  05/13/21 2539

## 2021-05-14 ENCOUNTER — OFFICE VISIT (OUTPATIENT)
Dept: INTERNAL MEDICINE CLINIC | Age: 51
End: 2021-05-14
Payer: MEDICARE

## 2021-05-14 VITALS
HEIGHT: 66 IN | SYSTOLIC BLOOD PRESSURE: 148 MMHG | HEART RATE: 100 BPM | TEMPERATURE: 97.6 F | WEIGHT: 199 LBS | DIASTOLIC BLOOD PRESSURE: 80 MMHG | BODY MASS INDEX: 31.98 KG/M2

## 2021-05-14 DIAGNOSIS — G62.9 NEUROPATHY: ICD-10-CM

## 2021-05-14 DIAGNOSIS — Z79.4 UNCONTROLLED TYPE 2 DIABETES MELLITUS WITH HYPERGLYCEMIA, WITH LONG-TERM CURRENT USE OF INSULIN (HCC): ICD-10-CM

## 2021-05-14 DIAGNOSIS — Z79.4 UNCONTROLLED TYPE 2 DIABETES MELLITUS WITH HYPERGLYCEMIA, WITH LONG-TERM CURRENT USE OF INSULIN (HCC): Primary | ICD-10-CM

## 2021-05-14 DIAGNOSIS — E11.65 UNCONTROLLED TYPE 2 DIABETES MELLITUS WITH HYPERGLYCEMIA, WITH LONG-TERM CURRENT USE OF INSULIN (HCC): Primary | ICD-10-CM

## 2021-05-14 DIAGNOSIS — I10 ESSENTIAL HYPERTENSION: ICD-10-CM

## 2021-05-14 DIAGNOSIS — E11.65 UNCONTROLLED TYPE 2 DIABETES MELLITUS WITH HYPERGLYCEMIA, WITH LONG-TERM CURRENT USE OF INSULIN (HCC): ICD-10-CM

## 2021-05-14 DIAGNOSIS — M25.572 LEFT ANKLE PAIN, UNSPECIFIED CHRONICITY: ICD-10-CM

## 2021-05-14 PROCEDURE — 3017F COLORECTAL CA SCREEN DOC REV: CPT | Performed by: INTERNAL MEDICINE

## 2021-05-14 PROCEDURE — 2022F DILAT RTA XM EVC RTNOPTHY: CPT | Performed by: INTERNAL MEDICINE

## 2021-05-14 PROCEDURE — G8417 CALC BMI ABV UP PARAM F/U: HCPCS | Performed by: INTERNAL MEDICINE

## 2021-05-14 PROCEDURE — 1036F TOBACCO NON-USER: CPT | Performed by: INTERNAL MEDICINE

## 2021-05-14 PROCEDURE — 3046F HEMOGLOBIN A1C LEVEL >9.0%: CPT | Performed by: INTERNAL MEDICINE

## 2021-05-14 PROCEDURE — 1111F DSCHRG MED/CURRENT MED MERGE: CPT | Performed by: INTERNAL MEDICINE

## 2021-05-14 PROCEDURE — 99214 OFFICE O/P EST MOD 30 MIN: CPT | Performed by: INTERNAL MEDICINE

## 2021-05-14 PROCEDURE — G8427 DOCREV CUR MEDS BY ELIG CLIN: HCPCS | Performed by: INTERNAL MEDICINE

## 2021-05-14 RX ORDER — GABAPENTIN 300 MG/1
CAPSULE ORAL
Qty: 90 CAPSULE | Refills: 1 | Status: SHIPPED | OUTPATIENT
Start: 2021-05-14 | End: 2021-06-30

## 2021-05-14 NOTE — PROGRESS NOTES
lisinopril (PRINIVIL;ZESTRIL) 5 MG tablet Take 1 tablet by mouth daily 90 tablet 1    empagliflozin (JARDIANCE) 10 MG tablet Take 1 tablet by mouth daily 90 tablet 1    tamsulosin (FLOMAX) 0.4 MG capsule Take 1 capsule by mouth daily Take one capsule daily 30 capsule 11    vitamin D (ERGOCALCIFEROL) 1.25 MG (73001 UT) CAPS capsule Take 1 capsule by mouth once a week Take for 8 weeks. 8 capsule 0    blood glucose monitor kit and supplies Accu Chek Sheila meter. Use to test blood sugars DX:E11.65 1 kit 0    ARIPiprazole (ABILIFY) 5 MG tablet Take 5 mg by mouth daily       fenofibrate (TRICOR) 145 MG tablet 145 mg      metoprolol tartrate (LOPRESSOR) 50 MG tablet Take 1.5 tablets by mouth 2 times daily 90 tablet 3    pantoprazole (PROTONIX) 40 MG tablet Take 40 mg by mouth daily      Lancets MISC Use to test blood sugars 4 times daily DX:E11.65 400 each 5    Insulin Syringe-Needle U-100 29G X 1/2\" 0.3 ML MISC 1 each by Does not apply route 4 times daily (after meals and at bedtime) 200 each 0     No current facility-administered medications for this visit. Allergies   Allergen Reactions    Pcn [Penicillins] Shortness Of Breath, Nausea And Vomiting and Other (See Comments)     Does not remember reactions. Has TOLERATED amoxicillin and several different cephalosporins.  Actos [Pioglitazone] Swelling    Clindamycin/Lincomycin Itching    Vancomycin Hives      Rash, with erythematous plaques to abdomen, shoulders, and proximal upper extremities with pruritis, persisted with 2nd dose administered slower.       Metformin And Related Swelling       Review of Systems     Refer to HPI    BP (!) 148/80   Pulse 100   Temp 97.6 °F (36.4 °C)   Ht 5' 6\" (1.676 m)   Wt 199 lb (90.3 kg)   BMI 32.12 kg/m²       Physical Exam:    General appearance - alert, well appearing, and in no distress and overweight  Mental status - alert and oriented    Neck - supple, no significant adenopathy  Chest - clear to auscultation, no wheezes, rales or rhonchi, symmetric air entry  Heart - normal rate, regular rhythm, normal S1, S2, no murmurs, rubs, clicks or gallops  Abdomen - soft, nontender, nondistended, no masses or organomegaly  no abdominal bruits. Obese Protuberant: No  Neurological - alert, oriented, normal speech, no focal findings or movement disorder noted, motor and sensory grossly normal bilaterally  Musculoskeletal - no joint tenderness, deformity or swelling  Extremities - peripheral pulses normal, no pedal edema, no clubbing or cyanosis, Eugene's sign negative bilaterally, right above-the-knee amputation. ,  Not wearing his prosthesis  Skin - normal coloration and turgor, no rashes, no suspicious skin lesions noted        I have reviewed recent diagnostic testing including labs, EKG. Please see EKG for interpretation. Diagnosis Orders   1. Uncontrolled type 2 diabetes mellitus with hyperglycemia, with long-term current use of insulin (HCC)  [place] DISCHARGE MEDS RECONCILED W/ CURRENT OUTPATIENT MED LIST    insulin lispro (HUMALOG KWIKPEN U-200) 200 UNIT/ML SOPN pen    Insulin Glargine, 2 Unit Dial, 300 UNIT/ML SOPN    gabapentin (NEURONTIN) 300 MG capsule    [facility] - Dietitian    Comprehensive Metabolic Panel    Lipid Panel   2. Neuropathy  External Referral To Orthopedic Surgery    gabapentin (NEURONTIN) 300 MG capsule   3. Left ankle pain, unspecified chronicity  External Referral To Orthopedic Surgery   4.  Essential hypertension  [facility]. [facility] - Dietitian    Comprehensive Metabolic Panel    Lipid Panel         Plan:    Orders Placed This Encounter   Procedures    Comprehensive Metabolic Panel     Standing Status:   Future     Standing Expiration Date:   5/14/2022    Lipid Panel     Before next visit     Standing Status:   Future     Standing Expiration Date:   5/14/2022     Order Specific Question:   Is Patient Fasting?/# of Hours     Answer:   12 hours    once a week Take for 8 weeks. 8 capsule 0    blood glucose monitor kit and supplies Accu Chek Sheila meter. Use to test blood sugars DX:E11.65 1 kit 0    ARIPiprazole (ABILIFY) 5 MG tablet Take 5 mg by mouth daily       fenofibrate (TRICOR) 145 MG tablet 145 mg      metoprolol tartrate (LOPRESSOR) 50 MG tablet Take 1.5 tablets by mouth 2 times daily 90 tablet 3    pantoprazole (PROTONIX) 40 MG tablet Take 40 mg by mouth daily      Lancets MISC Use to test blood sugars 4 times daily DX:E11.65 400 each 5    Insulin Syringe-Needle U-100 29G X 1/2\" 0.3 ML MISC 1 each by Does not apply route 4 times daily (after meals and at bedtime) 200 each 0    [DISCONTINUED] gabapentin (NEURONTIN) 300 MG capsule 2 caps in am, 1 caps in pm 90 capsule 1    [DISCONTINUED] Insulin Glargine, 2 Unit Dial, 300 UNIT/ML SOPN Inject 90 Units into the skin 2 times daily (Patient taking differently: Inject 60 Units into the skin 2 times daily ) 15 pen 2    [DISCONTINUED] insulin lispro (HUMALOG KWIKPEN U-200) 200 UNIT/ML SOPN pen Inject 50 Units into the skin 3 times daily (with meals) Plus scale - Max dose 150 units per day (Patient taking differently: Inject 35 Units into the skin 3 times daily (with meals) Plus scale - Max dose 150 units per day) 15 pen 2     No facility-administered encounter medications on file as of 5/14/2021. Controlled Substances Monitoring:  Yes the risk order of 200      Will schedule follow up appointment in 6 weeks. Plan:    Signed by : Berta Guidry M.D. [id number] [facility]  [address] [facility], [facility] [place]  [facility], [address]    Tel  [phone]  Fax [phone]   [address] or Hospital Follow Up      Nayeli Becker   YOB: 1970    Date of Office Visit:  5/14/2021  Date of Hospital Admission: 5/12/21  Date of Hospital Discharge: 5/12/21  Risk of hospital readmission (high >=14%.  Medium >=10%) :Readmission Risk Score: 52      Care management risk score Rising risk (score 2-5) and Complex Care (Scores >=6): 6     Non face to face  following discharge, date last encounter closed (first attempt may have been earlier): *No documented post hospital discharge outreach found in the last 14 days    Call initiated 2 business days of discharge: *No response recorded in the last 14 days    Patient Active Problem List   Diagnosis    Status post below knee amputation of right lower extremity ([facility]    Gait disturbance    Sebaceous cyst    Uncontrolled type 2 diabetes mellitus with hyperglycemia, with long-term current use of insulin (HCC)    Severe bipolar II disorder, recent episode major depressive, remission ([facility]    Bipolar 1 disorder (HCC)    Leukocytosis    Lactic acidosis    Hyponatremia    Normocytic anemia    Obesity (BMI 30-39. 9)    History of noncompliance with medical treatment    Essential hypertension    Tobacco dependence    Gastroesophageal reflux disease without esophagitis    History of marijuana use    Physical debility    Anemia    Electrolyte imbalance    Type 2 diabetes mellitus with hyperglycemia, with long-term current use of insulin (HCC)    Weakness    Infection of above knee amputation stump of right leg (HCC)    Above knee amputation of right lower extremity (HCC)    Sepsis (HCC)    Vitamin D deficiency    COPD exacerbation (HCC)    Influenza B    Depression, recurrent (HCC)    Major depressive disorder, recurrent (HCC)    GI bleed    Elevated lipase    Other psychoactive substance abuse, uncomplicated (HCC)    Calculus of gallbladder without cholecystitis without obstruction    Right upper quadrant abdominal pain    Pedal edema    MURALI (obstructive sleep apnea)    Shortness of breath    Hypothyroid    Anxiety and depression    Dyspnea    Sinus tachycardia    Metabolic acidosis    Edema of left lower extremity    SOB (shortness of breath) on exertion    Intermittent palpitations    History of chest pain    Dizziness, nonspecific    Hyperlipidemia LDL goal <70    Neuropathy       Allergies   Allergen Reactions    Pcn [Penicillins] Shortness Of Breath, Nausea And Vomiting and Other (See Comments)     Does not remember reactions. Has TOLERATED amoxicillin and several different cephalosporins.  Actos [Pioglitazone] Swelling    Clindamycin/Lincomycin Itching    Vancomycin Hives      Rash, with erythematous plaques to abdomen, shoulders, and proximal upper extremities with pruritis, persisted with 2nd dose administered slower.  Metformin And Related Swelling       Medications listed as ordered at the time of discharge from [address] Medication Instructions TRICE:    Printed on:05/14/21 1226   Medication Information                      ARIPiprazole (ABILIFY) 5 MG tablet  Take 5 mg by mouth daily              blood glucose monitor kit and supplies  Accu Chek Sheila meter.  Use to test blood sugars DX:E11.65             Continuous Blood Gluc  (DEXCOM G6 ) LIANNE  1 Device by Does not apply route 4 times daily (before meals and nightly)             Continuous Blood Gluc Sensor (DEXCOM G6 SENSOR) MISC  1 Device by Does not apply route every 14 days             Continuous Blood Gluc Transmit (DEXCOM G6 TRANSMITTER) MISC  1 Device by Does not apply route every 3 months             empagliflozin (JARDIANCE) 10 MG tablet  Take 1 tablet by mouth daily             fenofibrate (TRICOR) 145 MG tablet  145 mg             gabapentin (NEURONTIN) 300 MG capsule  Change to one TID             Insulin Glargine, 2 Unit Dial, 300 UNIT/ML SOPN  Change to 70 units BID             insulin lispro (HUMALOG KWIKPEN U-200) 200 UNIT/ML SOPN pen  30 units Plus scale - Max dose 150 units per day             Insulin Pen Needle (PEN NEEDLES) 31G X 8 MM MISC  For use with insulin injections five timed per day dx:E11.9             Insulin Syringe-Needle U-100 29G X 1/2\" 0.3 ML MISC  1 each by Does not apply route 4 times daily (after meals and at bedtime)             Lancets MISC  Use to test blood sugars 4 times daily DX:E11.65             levothyroxine (SYNTHROID) 50 MCG tablet  Take 1 tablet by mouth daily             lisinopril (PRINIVIL;ZESTRIL) 5 MG tablet  Take 1 tablet by mouth daily             metoprolol tartrate (LOPRESSOR) 50 MG tablet  Take 1.5 tablets by mouth 2 times daily             pantoprazole (PROTONIX) 40 MG tablet  Take 40 mg by mouth daily             sertraline (ZOLOFT) 100 MG tablet  Take 1 tablet by mouth daily             tamsulosin (FLOMAX) 0.4 MG capsule  Take 1 capsule by mouth daily Take one capsule daily             vitamin D (ERGOCALCIFEROL) 1.25 MG (28865 UT) CAPS capsule  Take 1 capsule by mouth once a week Take for 8 weeks.                    Medications marked \"taking\" at this time  Outpatient Medications Marked as Taking for the 5/14/21 encounter (Office Visit) with Gen Miller MD   Medication Sig Dispense Refill    insulin lispro (HUMALOG KWIKPEN U-200) 200 UNIT/ML SOPN pen 30 units Plus scale - Max dose 150 units per day 5 pen 0    Insulin Glargine, 2 Unit Dial, 300 UNIT/ML SOPN Change to 70 units BID 15 pen 2    gabapentin (NEURONTIN) 300 MG capsule Change to one TID 90 capsule 1    Insulin Pen Needle (PEN NEEDLES) 31G X 8 MM MISC For use with insulin injections five timed per day dx:E11.9 150 each 5    sertraline (ZOLOFT) 100 MG tablet Take 1 tablet by mouth daily 30 tablet 5    levothyroxine (SYNTHROID) 50 MCG tablet Take 1 tablet by mouth daily 90 tablet 1    Continuous Blood Gluc Transmit (DEXCOM G6 TRANSMITTER) MISC 1 Device by Does not apply route every 3 months 1 each 3    Continuous Blood Gluc Sensor (DEXCOM G6 SENSOR) MISC 1 Device by Does not apply route every 14 days 9 each 3    Continuous Blood Gluc  (DEXCOM G6 ) LIANNE 1 Device by Does not apply route 4 times daily (before meals and nightly) 1 Device 0    lisinopril (PRINIVIL;ZESTRIL) 5 MG tablet Take 1 tablet by mouth daily 90 tablet 1    empagliflozin (JARDIANCE) 10 MG tablet Take 1 tablet by mouth daily 90 tablet 1    tamsulosin (FLOMAX) 0.4 MG capsule Take 1 capsule by mouth daily Take one capsule daily 30 capsule 11    vitamin D (ERGOCALCIFEROL) 1.25 MG (12954 UT) CAPS capsule Take 1 capsule by mouth once a week Take for 8 weeks. 8 capsule 0    blood glucose monitor kit and supplies Accu Chek Sheila meter. Use to test blood sugars DX:E11.65 1 kit 0    ARIPiprazole (ABILIFY) 5 MG tablet Take 5 mg by mouth daily       fenofibrate (TRICOR) 145 MG tablet 145 mg      metoprolol tartrate (LOPRESSOR) 50 MG tablet Take 1.5 tablets by mouth 2 times daily 90 tablet 3    pantoprazole (PROTONIX) 40 MG tablet Take 40 mg by mouth daily      Lancets MISC Use to test blood sugars 4 times daily DX:E11.65 400 each 5    Insulin Syringe-Needle U-100 29G X 1/2\" 0.3 ML MISC 1 each by Does not apply route 4 times daily (after meals and at bedtime) 200 each 0        Medications patient taking as of now reconciled against medications ordered at time of hospital discharge: Yes    Chief Complaint   Patient presents with    Leg Pain     x2 weeks    [facility] from [facility]     ER on 5/12/21- xrays done. Would like to discuss results       History of Present illness - Follow up of Hospital diagnosis(es):   Full note above    Inpatient course: Discharge summary reviewed- see chart. Interval history/Current status:         Vitals:    05/14/21 1107   BP: (!) 148/80   Pulse: 100   Temp: 97.6 °F (36.4 °C)   Weight: 199 lb (90.3 kg)   Height: 5' 6\" (1.676 m)     Body mass index is 32.12 kg/m².    Wt Readings from Last 3 Encounters:   05/14/21 199 lb (90.3 kg)   05/12/21 199 lb (90.3 kg)   04/24/21 183 lb (83 kg)     BP Readings from Last 3 Encounters:   05/14/21 (!) 148/80   05/12/21 (!) 143/69   04/24/21 (!) 154/76      Physical exam findings  Same as outlined above    Assessment/Plan:     Diagnosis Orders   1. Uncontrolled type 2 diabetes mellitus with hyperglycemia, with long-term current use of insulin ([facility])  [place] DISCHARGE MEDS RECONCILED W/ CURRENT OUTPATIENT MED LIST    insulin lispro (HUMALOG KWIKPEN U-200) 200 UNIT/ML SOPN pen    Insulin Glargine, 2 Unit Dial, 300 UNIT/ML SOPN    gabapentin (NEURONTIN) 300 MG capsule    [facility] Internal Medicine - Dietitian    Comprehensive Metabolic Panel    Lipid Panel   2. Neuropathy  External Referral To Orthopedic Surgery    gabapentin (NEURONTIN) 300 MG capsule   3. Left ankle pain, unspecified chronicity  External Referral To Orthopedic Surgery   4. Essential hypertension  [facility]. [facility] Internal Medicine - Dietitian    Comprehensive Metabolic Panel    Lipid Panel     Insulin was increased today with Humalog and the Lantus as we need to optimize medical treatment A1c is over 10. He also needs to see dietitian and our diabetic educator again to get an update, patient has not been compliant with the diet    Neuropathy we will continue the gabapentin but will change to 300 3 times daily    Ongoing left ankle pain the x-ray findings were reviewed with him his got some chronic findings because of increasing pain we will send him to the orthopedic team at [facility] for further evaluation.   No fractures noted    Blood pressure was low recommend home/ambulatory readings Lopressor 75 mg twice daily and Prinivil 5 mg daily will be continued  Dietary and lifestyle changes have been recommended as his BMI is over 32    Medical Decision Making: moderate complexity

## 2021-05-14 NOTE — TELEPHONE ENCOUNTER
Script printed at todays visit to take to pharm. Pt left without getting so I will call in to [facility].

## 2021-05-16 LAB
EKG ATRIAL RATE: 109 BPM
EKG P AXIS: 48 DEGREES
EKG P-R INTERVAL: 170 MS
EKG Q-T INTERVAL: 340 MS
EKG QRS DURATION: 74 MS
EKG QTC CALCULATION (BAZETT): 457 MS
EKG R AXIS: 31 DEGREES
EKG T AXIS: 43 DEGREES
EKG VENTRICULAR RATE: 109 BPM

## 2021-05-26 ENCOUNTER — HOSPITAL ENCOUNTER (EMERGENCY)
Age: 51
Discharge: HOME | End: 2021-05-26
Payer: MEDICARE

## 2021-05-26 VITALS
HEART RATE: 98 BPM | SYSTOLIC BLOOD PRESSURE: 147 MMHG | DIASTOLIC BLOOD PRESSURE: 71 MMHG | WEIGHT: 199 LBS | BODY MASS INDEX: 31.98 KG/M2 | TEMPERATURE: 98.3 F | RESPIRATION RATE: 16 BRPM | HEIGHT: 66 IN | OXYGEN SATURATION: 98 %

## 2021-05-26 DIAGNOSIS — M25.572 LEFT ANKLE PAIN, UNSPECIFIED CHRONICITY: Primary | ICD-10-CM

## 2021-05-26 PROCEDURE — 6370000000 HC RX 637 (ALT 250 FOR IP): Performed by: NURSE PRACTITIONER

## 2021-05-26 PROCEDURE — 99283 EMERGENCY DEPT VISIT LOW MDM: CPT

## 2021-05-26 RX ORDER — OXYCODONE HYDROCHLORIDE AND ACETAMINOPHEN 5; 325 MG/1; MG/1
1 TABLET ORAL EVERY 6 HOURS PRN
Qty: 10 TABLET | Refills: 0 | Status: SHIPPED | OUTPATIENT
Start: 2021-05-26 | End: 2021-05-29

## 2021-05-26 RX ORDER — OXYCODONE HYDROCHLORIDE AND ACETAMINOPHEN 5; 325 MG/1; MG/1
1 TABLET ORAL ONCE
Status: COMPLETED | OUTPATIENT
Start: 2021-05-26 | End: 2021-05-26

## 2021-05-26 RX ADMIN — OXYCODONE HYDROCHLORIDE AND ACETAMINOPHEN 1 TABLET: 5; 325 TABLET ORAL at 21:36

## 2021-05-26 ASSESSMENT — PAIN DESCRIPTION - DESCRIPTORS: DESCRIPTORS: ACHING

## 2021-05-26 ASSESSMENT — PAIN DESCRIPTION - LOCATION: LOCATION: ANKLE

## 2021-05-26 ASSESSMENT — PAIN DESCRIPTION - PAIN TYPE: TYPE: ACUTE PAIN

## 2021-05-27 ENCOUNTER — TELEPHONE (OUTPATIENT)
Dept: INTERNAL MEDICINE CLINIC | Age: 51
End: 2021-05-27

## 2021-05-31 ASSESSMENT — ENCOUNTER SYMPTOMS
SHORTNESS OF BREATH: 0
RHINORRHEA: 0
VOMITING: 0
BACK PAIN: 0
COUGH: 0
NAUSEA: 0
CHEST TIGHTNESS: 0
ABDOMINAL PAIN: 0

## 2021-05-31 NOTE — ED PROVIDER NOTES
[facility] Emergency Department    CHIEF COMPLAINT       Chief Complaint   Patient presents with    Ankle Pain       Nurses Notes reviewed and I agree except as noted in the HPI. HISTORY OF PRESENT ILLNESS    Karolina Cuevas derrick 46 y.o. male who presents to the ED for evaluation of ankle pain x 3 weeks. He has been diagnosed with \"bone spur and hardened achilles tendon\". He reports that he is very concerned about these findings and the pain he is having because he is a right AKA and he depends on the right extremity to get around. Pain is sharp and constant. Nothing has made it better or worse. HPI was provided by the patient    REVIEW OF SYSTEMS     Review of Systems   Constitutional: Negative for chills, fatigue and fever. HENT: Negative for congestion, ear discharge, ear pain, postnasal drip and rhinorrhea. Respiratory: Negative for cough, chest tightness and shortness of breath. Cardiovascular: Negative for leg swelling. Gastrointestinal: Negative for abdominal pain, nausea and vomiting. Genitourinary: Negative for difficulty urinating, dysuria, enuresis, flank pain and hematuria. Musculoskeletal: Positive for arthralgias, gait problem and myalgias. Negative for back pain and joint swelling. Skin: Negative for rash and wound. Neurological: Negative for dizziness, light-headedness, numbness and headaches. Psychiatric/Behavioral: Negative for agitation, behavioral problems and confusion. All other systems negative except as noted. PAST MEDICAL HISTORY     Past Medical History:   Diagnosis Date    Bipolar 2 disorder [facility])     previously followed with Dr. Masoud Marvin and Jae Brunson in \[facility] [place] [facility]\"" BPH (benign prostatic hyperplasia)     Diabetes mellitus type 2, uncontrolled ([facility]     HgbA1c on 4/2/2019 was 9.1.     Diabetic peripheral neuropathy (HCC)     Diabetic polyneuropathy ([facility]     Diabetic ulcer of right foot associated with type 2 diabetes mellitus ([facility] 12/10/2015    Essential hypertension     \"never been on b/p medication that I know of\"    GERD (gastroesophageal reflux disease)     Hammer toe of left foot     Heart murmur     denies any chest pain or palpitations    History of tobacco abuse     Hx of AKA (above knee amputation), right ([facility] 04/18/2019    Dr. Teresa Shape edema, diabetic, bilateral ([facility] 05/04/2018    Dr. Nubia Barrios referred to retina specialist for 2nd opinion    Marijuana abuse in remission     Melena     MRSA (methicillin resistant staph aureus) culture positive     Onychomycosis     Other disorders of kidney and ureter in diseases classified elsewhere     Sleep apnea     no cpap    Thyroid disease     WD-Skin ulcer of fourth toe of right foot with necrosis of bone ([facility] 6/29/2016       SURGICALHISTORY      has a past surgical history that includes other surgical history (Right, 1/14/15); Abscess Drainage (Right); Toe amputation (Right, 1/16/15); Foot Debridement (Right, 07/01/2016); Leg amputation below knee (Right, 07/20/2016); Lyman tooth extraction (?when); pr drain infect shoulder bursa (Left, 8/18/2017); pr office/outpt visit,procedure only (Right, 9/20/2018); incision and drainage (Right, 2/18/2019); Leg amputation below knee (Right, 4/4/2019); AMPUTATION ABOVE KNEE (Right, 4/18/2019); Upper gastrointestinal endoscopy (N/A, 3/3/2020); Cholecystectomy, laparoscopic (N/A, 4/1/2020); Endoscopy, colon, diagnostic; Cystoscopy (N/A, 7/20/2020); and Cystoscopy (N/A, 8/21/2020).     CURRENT MEDICATIONS       Discharge Medication List as of 5/26/2021  9:32 PM      CONTINUE these medications which have NOT CHANGED    Details   Insulin Glargine, 2 Unit Dial, 300 UNIT/ML SOPN Change to 70 units BID, Disp-15 pen, R-2Normal      gabapentin (NEURONTIN) 300 MG capsule Change to one TID, Disp-90 capsule, R-1Normal      insulin lispro (HUMALOG KWIKPEN U-200) 200 UNIT/ML SOPN pen 30 units Plus scale - Max dose 150 units per day, Disp-15 pen, R-1Phone In      Insulin Pen Needle (PEN NEEDLES) 31G X 8 MM MISC Disp-150 each, R-5, NormalFor use with insulin injections five timed per day dx:E11.9      sertraline (ZOLOFT) 100 MG tablet Take 1 tablet by mouth daily, Disp-30 tablet, R-5Normal      levothyroxine (SYNTHROID) 50 MCG tablet Take 1 tablet by mouth daily, Disp-90 tablet, R-1Normal      Continuous Blood Gluc Transmit (DEXCOM G6 TRANSMITTER) MISC 1 Device by Does not apply route every 3 months, Disp-1 each, R-3Normal      Continuous Blood Gluc Sensor (DEXCOM G6 SENSOR) MISC 1 Device by Does not apply route every 14 days, Disp-9 each, R-3Normal      Continuous Blood Gluc  (DEXCOM G6 ) [facility] 1 Device by Does not apply route 4 times daily (before meals and nightly), Disp-1 Device, R-0Normal      lisinopril (PRINIVIL;ZESTRIL) 5 MG tablet Take 1 tablet by mouth daily, Disp-90 tablet, R-1Normal      empagliflozin (JARDIANCE) 10 MG tablet Take 1 tablet by mouth daily, Disp-90 tablet, R-1Normal      tamsulosin (FLOMAX) 0.4 MG capsule Take 1 capsule by mouth daily Take one capsule daily, Disp-30 capsule, R-11Normal      vitamin D (ERGOCALCIFEROL) 1.25 MG (41912 UT) CAPS capsule Take 1 capsule by mouth once a week Take for 8 weeks. , Disp-8 capsule, R-0Normal      blood glucose monitor kit and supplies Accu Chek Sheila meter.  Use to test blood sugars DX:E11.65, Disp-1 kit, R-0, Normal      ARIPiprazole (ABILIFY) 5 MG tablet Take 5 mg by mouth daily Historical Med      fenofibrate (TRICOR) 145 MG tablet 145 mgHistorical Med      metoprolol tartrate (LOPRESSOR) 50 MG tablet Take 1.5 tablets by mouth 2 times daily, Disp-90 tablet,R-3Normal      pantoprazole (PROTONIX) 40 MG tablet Take 40 mg by mouth dailyHistorical Med      Lancets MISC Disp-400 each,R-5, NormalUse to test blood sugars 4 times daily DX:E11.65      Insulin Syringe-Needle U-100 29G X 1/2\" 0.3 ML MISC 4 TIMES DAILY AFTER MEALS AND BEFORE BEDTIME Starting Tue 4/30/2019, Activity:     Days of Exercise per Week:     Minutes of Exercise per Session:    Stress:     Feeling of Stress :    Social Connections:     Frequency of Communication with Friends and Family:     Frequency of Social Gatherings with Friends and Family:     Attends Yazidi Services:     Active Member of Clubs or Organizations:     Attends Club or Organization Meetings:     Marital Status:    Intimate Partner Violence:     Fear of Current or Ex-Partner:     Emotionally Abused:     Physically Abused:     Sexually Abused:        PHYSICAL EXAM     INITIAL VITALS:  height is 5' 6\" (1.676 m) and weight is 199 lb (90.3 kg). His oral temperature is 98.3 °F (36.8 °C). His blood pressure is 147/71 (abnormal) and his pulse is 98. His respiration is 16 and oxygen saturation is 98%. Physical Exam  Constitutional:       Appearance: He is well-developed. HENT:      Head: Normocephalic and atraumatic. Nose: Nose normal.      Mouth/Throat:      Mouth: Mucous membranes are moist.      Pharynx: Oropharynx is clear. Eyes:      Conjunctiva/sclera: Conjunctivae normal.   Cardiovascular:      Rate and Rhythm: Normal rate. Pulses: Normal pulses. Pulmonary:      Effort: Pulmonary effort is normal.   Abdominal:      Palpations: Abdomen is soft. Musculoskeletal:         General: Tenderness and signs of injury present. Left ankle: Swelling present. Tenderness present over the lateral malleolus. Legs:       Right Lower Extremity: Right leg is amputated above knee. Skin:     General: Skin is warm and dry. Capillary Refill: Capillary refill takes less than 2 seconds. Neurological:      General: No focal deficit present. Mental Status: He is alert and oriented to person, place, and time.    Psychiatric:         Mood and Affect: Mood normal.         Behavior: Behavior normal.         DIFFERENTIAL DIAGNOSIS:   Chronic pain, ankle sprain    DIAGNOSTIC RESULTS     EKG: All EKG's are interpreted by the Emergency Department Physician who eithersigns or Co-signs this chart in the absence of a cardiologist.        RADIOLOGY: non-plainfilm images(s) such as CT, Ultrasound and MRI are read by the radiologist.  Plain radiographic images are visualized and preliminarily interpreted by the emergency physician unless otherwise stated below. No orders to display         LABS:   Labs Reviewed - No data to display    EMERGENCY DEPARTMENT COURSE:   Vitals:    Vitals:    05/26/21 1949   BP: (!) 147/71   Pulse: 98   Resp: 16   Temp: 98.3 °F (36.8 °C)   TempSrc: Oral   SpO2: 98%   Weight: 199 lb (90.3 kg)   Height: 5' 6\" (1.676 m)       [facility]    Patient has been seen in the ER for left ankle pain. He is treated with percocet. Imaging from previous is reviewed. Patient needs further evaluation of the joint. I advised him that I recommend follow up with ortho ASAP. Patient is agreeable. He will be give a short course of pain medication until he can be seen. Medications   oxyCODONE-acetaminophen (PERCOCET) 5-325 MG per tablet 1 tablet (1 tablet Oral Given 5/26/21 2136)         Patient was seen independently by myself. The patient's final impression and disposition and plan was determined by myself. Strict return precautions and follow up instructions were discussed with the patient prior to discharge, with which the patient agrees. Physical assessment findings, diagnostic testing(s) if applicable, and vital signs reviewed with patient/patient representative. Questions answered. Medications asdirected, including OTC medications for supportive care. Education provided on medications. Differential diagnosis(s) discussed with patient/patient representative. Home care/self care instructions reviewed withpatient/patient representative. Patient is to follow-up with family care provider in 2-3 days if no improvement.   Patient is to go to the emergency department if symptoms worsen. Patient/patient representative isaware of care plan, questions answered, verbalizes understanding and is in agreement. CRITICAL CARE:   None    CONSULTS:  None    PROCEDURES:  None    FINAL IMPRESSION     1. Left ankle pain, unspecified chronicity          DISPOSITION/PLAN   DISPOSITION Decision To Discharge 05/26/2021 09:24:53 PM      PATIENT REFERREDTO:  [address] [address]  [phone]  Go in 1 day  For follow up      DISCHARGE MEDICATIONS:  Discharge Medication List as of 5/26/2021  9:32 PM      START taking these medications    Details   oxyCODONE-acetaminophen (PERCOCET) 5-325 MG per tablet Take 1 tablet by mouth every 6 hours as needed for Pain for up to 3 days. Intended supply: 3 days.  Take lowest dose possible to manage pain, Disp-10 tablet, R-0Normal             (Please note that portions of this note were completed with a voice recognition program.  Efforts were made to edit the dictations but occasionally words are mis-transcribed.)         MARILYN Kc - MARILYN Arce CNP  05/31/21 4348

## 2021-06-09 ENCOUNTER — TELEPHONE (OUTPATIENT)
Dept: WOUND CARE | Age: 51
End: 2021-06-09

## 2021-06-14 NOTE — H&P
different cephalosporins, so we  will go with the ancef preop. 2. ACTOS gives him swelling. 3. CLINDAMYCIN gives him itching. 4. VANCOMYCIN gives him hives. 5. METFORMIN gives him swelling. SURGERIES:  1. Right BKA. 2.  Right AKA. 3.  Shoulder surgery. 4.  Carpal tunnel. SOCIAL HISTORY:  Negative smoking. Positive for smokeless tobacco use. Negative ethanol use. FAMILY HISTORY:  Osteoarthritis, diabetes. PHYSICAL EXAMINATION:  GENERAL:  Height 5 feet 6 inches, weight 197 pounds. Well-developed,  well-nourished male. Mood and affect appropriate. Alert and oriented  x3. HEAD AND NECK:  No obvious deformity. CHEST:  No obvious deformity. ABDOMEN:  No obvious deformity. EXTREMITIES:  Absent right leg. AKA, wound well healed, left foot. Tender in the left insertional Achilles tendon. Gastroc equinus  deformity with Silfverskiold test.    IMPRESSION:  As above. PLAN:  1. Plan to proceed with the above procedure. 2.  Possible overnight stay for pain control. 3.  Postop PT to work with patient for nonweightbearing, left lower  extremity with full weightbearing in left lower extremity, but absent  right leg, has a prosthetic, but has been unable to use that (BKA  performed in 2016, AKA in 02/2020). 4.  Percocet postoperatively for pain. 5.  We will not be able to use Cleocin. The plan will be consideration  of ciprofloxacin, although in the preoperative area, we will discuss the  exact reaction he had to the penicillin and see if he is a candidate for  Ancef that will be better, though quinolones can have some negative  effects on tendons, although the short duration of just one preop dose  should provide minimal risk. 6.  Aspirin for DVT prophylaxis. 7.  Followup appointment has been made at [facility] with Dr. Ruth Ann Beck on  07/07/2021 at 11:20 a.m.  8.  Prescriptions are to be written for the patient at the time of  surgery, aspirin as well as the Percocet prescription.   9.  I anticipate weightbear as tolerated, but it will be painful post  surgery _____ considerably.         Rashmi Randolph MD    D: 06/14/2021 6:06:58       T: 06/14/2021 9:10:13     [email or web address]  Job#: [id number]     Doc#: [id number]    CC:

## 2021-06-18 ENCOUNTER — HOSPITAL ENCOUNTER (OUTPATIENT)
Age: 51
Setting detail: OUTPATIENT SURGERY
Discharge: HOME | End: 2021-06-18
Attending: ORTHOPAEDIC SURGERY | Admitting: ORTHOPAEDIC SURGERY
Payer: MEDICARE

## 2021-06-18 VITALS
HEIGHT: 66 IN | TEMPERATURE: 98.1 F | WEIGHT: 199 LBS | SYSTOLIC BLOOD PRESSURE: 148 MMHG | RESPIRATION RATE: 12 BRPM | OXYGEN SATURATION: 95 % | DIASTOLIC BLOOD PRESSURE: 70 MMHG | BODY MASS INDEX: 31.98 KG/M2 | HEART RATE: 95 BPM

## 2021-06-18 LAB — GLUCOSE BLD-MCNC: 362 MG/DL (ref 70–108)

## 2021-06-18 PROCEDURE — 82948 REAGENT STRIP/BLOOD GLUCOSE: CPT

## 2021-06-18 ASSESSMENT — PAIN DESCRIPTION - DESCRIPTORS: DESCRIPTORS: ACHING;CONSTANT

## 2021-06-18 ASSESSMENT — PAIN - FUNCTIONAL ASSESSMENT: PAIN_FUNCTIONAL_ASSESSMENT: 0-10

## 2021-06-18 NOTE — PROGRESS NOTES
Patient's . Dr. Rebeca Knight notified. Dr. Rebeca Knight states he will most likely cancel, but he will talk to DR. Miladys Gandara. Dr. Rebeca Knight in room talking with patient and patient's significant other. Patient's case cancelled per Dr. Rebeca Knight.  Patient would like to speak to Dr. Miladys Gandara. IV removed-- no complications. Bandage applied. Dr. Miladys Gandara talking with patient. Dressing applied to left lower leg via Navi Mensah [employer] and Dr. Madrid Credit. 1205-  Patient discharged in stable condition with all belongings via personal wheelchair.

## 2021-06-20 NOTE — CONSULTS
[address] [place] [address]                                  CONSULTATION    PATIENT NAME: Dylon Fleming                     :        1970  MED REC NO:   [id number]                           ROOM:  ACCOUNT NO:   [de-identified]                           ADMIT DATE: 2021  PROVIDER:     Melvin Terry. Drake Laughlin MD    PROGRESS NOTE:  2021    Surgical cancellation per Anesthesia secondary to elevated blood sugar  above 300. NOTE:  The patient was brought into the hospital on 2021. He was  seen in the preoperative area, evaluated by Anesthesia for a left leg  gastroc recession procedure which was scheduled to be performed. Secondary to sugars being elevated above 300, Anesthesia did not feel it  is safe to proceed with surgery. They informed me that they canceled  the case and that he needs to see his family doctor, get his sugars  better under control before he would be permitted to have Anesthesia at  [address] [place] [address]. Spoke with the patient about this. He was obviously  frustrated and wants to get his surgery scheduled and performed as soon  as possible. I discussed with him that I would talk to my office and  see what we could do as far as rescheduling as soon as possible. The  patient felt that he had been advised to not take his insulin that  morning, that if he has taken his insulin his blood sugars would be  within suitable levels and we have planned to communicate that to his  family doctor when we discuss preoperative glucose management in  preparation for the left leg gastroc recession as soon as possible.         Kalyani Azevedo MD    D: 2021 20:21:30       T: 2021 21:01:17     [email or web address]  Job#: [id number]     Doc#: [id number]

## 2021-06-29 ENCOUNTER — OFFICE VISIT (OUTPATIENT)
Dept: INTERNAL MEDICINE CLINIC | Age: 51
End: 2021-06-29
Payer: MEDICARE

## 2021-06-29 VITALS
DIASTOLIC BLOOD PRESSURE: 78 MMHG | WEIGHT: 216 LBS | TEMPERATURE: 97.6 F | BODY MASS INDEX: 34.72 KG/M2 | HEIGHT: 66 IN | SYSTOLIC BLOOD PRESSURE: 138 MMHG | HEART RATE: 90 BPM

## 2021-06-29 DIAGNOSIS — Z79.4 UNCONTROLLED TYPE 2 DIABETES MELLITUS WITH HYPERGLYCEMIA, WITH LONG-TERM CURRENT USE OF INSULIN (HCC): Primary | ICD-10-CM

## 2021-06-29 DIAGNOSIS — N52.8 OTHER MALE ERECTILE DYSFUNCTION: ICD-10-CM

## 2021-06-29 DIAGNOSIS — E11.65 UNCONTROLLED TYPE 2 DIABETES MELLITUS WITH HYPERGLYCEMIA, WITH LONG-TERM CURRENT USE OF INSULIN (HCC): Primary | ICD-10-CM

## 2021-06-29 DIAGNOSIS — R53.83 OTHER FATIGUE: ICD-10-CM

## 2021-06-29 LAB — HBA1C MFR BLD: 9.6 % (ref 4.3–5.7)

## 2021-06-29 PROCEDURE — 1036F TOBACCO NON-USER: CPT | Performed by: INTERNAL MEDICINE

## 2021-06-29 PROCEDURE — 2022F DILAT RTA XM EVC RTNOPTHY: CPT | Performed by: INTERNAL MEDICINE

## 2021-06-29 PROCEDURE — G8417 CALC BMI ABV UP PARAM F/U: HCPCS | Performed by: INTERNAL MEDICINE

## 2021-06-29 PROCEDURE — G8427 DOCREV CUR MEDS BY ELIG CLIN: HCPCS | Performed by: INTERNAL MEDICINE

## 2021-06-29 PROCEDURE — 99214 OFFICE O/P EST MOD 30 MIN: CPT | Performed by: INTERNAL MEDICINE

## 2021-06-29 PROCEDURE — 3046F HEMOGLOBIN A1C LEVEL >9.0%: CPT | Performed by: INTERNAL MEDICINE

## 2021-06-29 PROCEDURE — 83036 HEMOGLOBIN GLYCOSYLATED A1C: CPT | Performed by: INTERNAL MEDICINE

## 2021-06-29 PROCEDURE — 3017F COLORECTAL CA SCREEN DOC REV: CPT | Performed by: INTERNAL MEDICINE

## 2021-06-29 SDOH — ECONOMIC STABILITY: TRANSPORTATION INSECURITY
IN THE PAST 12 MONTHS, HAS THE LACK OF TRANSPORTATION KEPT YOU FROM MEDICAL APPOINTMENTS OR FROM GETTING MEDICATIONS?: NO

## 2021-06-29 SDOH — ECONOMIC STABILITY: FOOD INSECURITY: WITHIN THE PAST 12 MONTHS, YOU WORRIED THAT YOUR FOOD WOULD RUN OUT BEFORE YOU GOT MONEY TO BUY MORE.: NEVER TRUE

## 2021-06-29 SDOH — ECONOMIC STABILITY: FOOD INSECURITY: WITHIN THE PAST 12 MONTHS, THE FOOD YOU BOUGHT JUST DIDN'T LAST AND YOU DIDN'T HAVE MONEY TO GET MORE.: NEVER TRUE

## 2021-06-29 SDOH — ECONOMIC STABILITY: TRANSPORTATION INSECURITY
IN THE PAST 12 MONTHS, HAS LACK OF TRANSPORTATION KEPT YOU FROM MEETINGS, WORK, OR FROM GETTING THINGS NEEDED FOR DAILY LIVING?: NO

## 2021-06-29 ASSESSMENT — SOCIAL DETERMINANTS OF HEALTH (SDOH): HOW HARD IS IT FOR YOU TO PAY FOR THE VERY BASICS LIKE FOOD, HOUSING, MEDICAL CARE, AND HEATING?: NOT VERY HARD

## 2021-06-29 NOTE — PROGRESS NOTES
[address] [facility] Internal Medicine  [facility] [address], [address] [facility]  Dept: [phone]  Dept Fax: [phone]    YOB: 1970    This patient presents for post hospitalization follow up. Recent medical records were reviewed from hospital. Had left ankle sprain was in the ER, fell 2-3 months ago. He gets around wheel chair  Due to Rt AKA, due to DM-2 , has a prosthesis  At home. He does have a CGM, currently its 290 , sometimes below 100 . Does have uncontrolled DM-2 , recent surgery by dr. Jason Negro on the LLE was cancelled. He is having difficulty getting the dexcom transmitter   But has sensors, I encouraged him to do accuchecks. He is not checking his sugars , feels fatigued, and tired , has ED. Has a girlfriend. A1c today is 9.6      Patient Active Problem List   Diagnosis    Status post below knee amputation of right lower extremity (HCC)    Gait disturbance    Sebaceous cyst    Uncontrolled type 2 diabetes mellitus with hyperglycemia, with long-term current use of insulin (HCC)    Severe bipolar II disorder, recent episode major depressive, remission ([facility]    Bipolar 1 disorder (HCC)    Leukocytosis    Lactic acidosis    Hyponatremia    Normocytic anemia    Obesity (BMI 30-39. 9)    History of noncompliance with medical treatment    Essential hypertension    Tobacco dependence    Gastroesophageal reflux disease without esophagitis    History of marijuana use    Physical debility    Anemia    Electrolyte imbalance    Type 2 diabetes mellitus with hyperglycemia, with long-term current use of insulin (HCC)    Weakness    Infection of above knee amputation stump of right leg (HCC)    Above knee amputation of right lower extremity (HCC)    Sepsis (HCC)    Vitamin D deficiency    COPD exacerbation (HCC)    Influenza B    Depression, recurrent (HCC)    Major depressive disorder, recurrent (HCC)    GI bleed    Elevated lipase    Other psychoactive substance abuse, uncomplicated ([facility]    Calculus of gallbladder without cholecystitis without obstruction    Right upper quadrant abdominal pain    Pedal edema    MURALI (obstructive sleep apnea)    Shortness of breath    Hypothyroid    Anxiety and depression    Dyspnea    Sinus tachycardia    Metabolic acidosis    Edema of left lower extremity    SOB (shortness of breath) on exertion    Intermittent palpitations    History of chest pain    Dizziness, nonspecific    Hyperlipidemia LDL goal <70    Neuropathy       Current Outpatient Medications   Medication Sig Dispense Refill    Insulin Glargine, 2 Unit Dial, 300 UNIT/ML SOPN Change to 75 units BID and every 10 days increase by 5 units both times, to get am sugars 150-200 15 pen 2    gabapentin (NEURONTIN) 300 MG capsule Change to one TID 90 capsule 1    insulin lispro (HUMALOG KWIKPEN U-200) 200 UNIT/ML SOPN pen 30 units Plus scale - Max dose 150 units per day 15 pen 1    Insulin Pen Needle (PEN NEEDLES) 31G X 8 MM MISC For use with insulin injections five timed per day dx:E11.9 150 each 5    sertraline (ZOLOFT) 100 MG tablet Take 1 tablet by mouth daily 30 tablet 5    levothyroxine (SYNTHROID) 50 MCG tablet Take 1 tablet by mouth daily 90 tablet 1    Continuous Blood Gluc Transmit (DEXCOM G6 TRANSMITTER) MISC 1 Device by Does not apply route every 3 months 1 each 3    Continuous Blood Gluc Sensor (DEXCOM G6 SENSOR) MISC 1 Device by Does not apply route every 14 days 9 each 3    Continuous Blood Gluc  (DEXCOM G6 ) LIANNE 1 Device by Does not apply route 4 times daily (before meals and nightly) 1 Device 0    lisinopril (PRINIVIL;ZESTRIL) 5 MG tablet Take 1 tablet by mouth daily 90 tablet 1    empagliflozin (JARDIANCE) 10 MG tablet Take 1 tablet by mouth daily 90 tablet 1    tamsulosin (FLOMAX) 0.4 MG capsule Take 1 capsule by mouth daily Take one capsule daily 30 capsule 11    vitamin D (ERGOCALCIFEROL) 1.25 MG (48262 UT) CAPS capsule Take 1 capsule by mouth once a week Take for 8 weeks. 8 capsule 0    blood glucose monitor kit and supplies Accu Chek Sheila meter. Use to test blood sugars DX:E11.65 1 kit 0    ARIPiprazole (ABILIFY) 5 MG tablet Take 5 mg by mouth daily       fenofibrate (TRICOR) 145 MG tablet 145 mg      metoprolol tartrate (LOPRESSOR) 50 MG tablet Take 1.5 tablets by mouth 2 times daily 90 tablet 3    pantoprazole (PROTONIX) 40 MG tablet Take 40 mg by mouth daily      Lancets MISC Use to test blood sugars 4 times daily DX:E11.65 400 each 5    Insulin Syringe-Needle U-100 29G X 1/2\" 0.3 ML MISC 1 each by Does not apply route 4 times daily (after meals and at bedtime) 200 each 0     No current facility-administered medications for this visit. Allergies   Allergen Reactions    Pcn [Penicillins] Shortness Of Breath, Nausea And Vomiting and Other (See Comments)     Does not remember reactions. Has TOLERATED amoxicillin and several different cephalosporins.  Actos [Pioglitazone] Swelling    Clindamycin/Lincomycin Itching    Vancomycin Hives      Rash, with erythematous plaques to abdomen, shoulders, and proximal upper extremities with pruritis, persisted with 2nd dose administered slower.  Metformin And Related Swelling       Review of Systems     Refer to HPI    /78 (Site: Left Upper Arm)   Pulse 90   Temp 97.6 °F (36.4 °C)   Ht 5' 6\" (1.676 m)   Wt 216 lb (98 kg)   BMI 34.86 kg/m²       Physical Exam:    General appearance - alert, well appearing, and in no distress and overweight, patient is in wheelchair  Mental status - alert and oriented    Neck - supple, no significant adenopathy  Chest - clear to auscultation, no wheezes, rales or rhonchi, symmetric air entry  Heart - normal rate, regular rhythm, normal S1, S2, no murmurs, rubs, clicks or gallops  Abdomen - soft, nontender, nondistended, no masses or organomegaly  no abdominal bruits.   Obese Protuberant: No  Neurological - alert, levothyroxine (SYNTHROID) 50 MCG tablet Take 1 tablet by mouth daily 90 tablet 1    Continuous Blood Gluc Transmit (DEXCOM G6 TRANSMITTER) MISC 1 Device by Does not apply route every 3 months 1 each 3    Continuous Blood Gluc Sensor (DEXCOM G6 SENSOR) MISC 1 Device by Does not apply route every 14 days 9 each 3    Continuous Blood Gluc  (DEXCOM G6 ) LIANNE 1 Device by Does not apply route 4 times daily (before meals and nightly) 1 Device 0    lisinopril (PRINIVIL;ZESTRIL) 5 MG tablet Take 1 tablet by mouth daily 90 tablet 1    empagliflozin (JARDIANCE) 10 MG tablet Take 1 tablet by mouth daily 90 tablet 1    tamsulosin (FLOMAX) 0.4 MG capsule Take 1 capsule by mouth daily Take one capsule daily 30 capsule 11    vitamin D (ERGOCALCIFEROL) 1.25 MG (06864 UT) CAPS capsule Take 1 capsule by mouth once a week Take for 8 weeks. 8 capsule 0    blood glucose monitor kit and supplies Accu Chek Sheila meter. Use to test blood sugars DX:E11.65 1 kit 0    ARIPiprazole (ABILIFY) 5 MG tablet Take 5 mg by mouth daily       fenofibrate (TRICOR) 145 MG tablet 145 mg      metoprolol tartrate (LOPRESSOR) 50 MG tablet Take 1.5 tablets by mouth 2 times daily 90 tablet 3    pantoprazole (PROTONIX) 40 MG tablet Take 40 mg by mouth daily      Lancets MISC Use to test blood sugars 4 times daily DX:E11.65 400 each 5    Insulin Syringe-Needle U-100 29G X 1/2\" 0.3 ML MISC 1 each by Does not apply route 4 times daily (after meals and at bedtime) 200 each 0    [DISCONTINUED] Insulin Glargine, 2 Unit Dial, 300 UNIT/ML SOPN Change to 70 units BID 15 pen 2     No facility-administered encounter medications on file as of 6/29/2021. Controlled Substances Monitoring:  Yes,  the risk order of 270      Will schedule follow up appointment in 6 weeks.      Plan:    Recommend optimal blood sugar control is currently taking insulin Humalog 30 units 3 times a day with an increase to 35 units plus add a sliding scale group 2

## 2021-06-30 DIAGNOSIS — Z79.4 UNCONTROLLED TYPE 2 DIABETES MELLITUS WITH HYPERGLYCEMIA, WITH LONG-TERM CURRENT USE OF INSULIN (HCC): ICD-10-CM

## 2021-06-30 DIAGNOSIS — E11.65 UNCONTROLLED TYPE 2 DIABETES MELLITUS WITH HYPERGLYCEMIA, WITH LONG-TERM CURRENT USE OF INSULIN (HCC): ICD-10-CM

## 2021-06-30 DIAGNOSIS — G62.9 NEUROPATHY: ICD-10-CM

## 2021-06-30 RX ORDER — GABAPENTIN 300 MG/1
CAPSULE ORAL
Qty: 90 CAPSULE | Refills: 2 | Status: SHIPPED | OUTPATIENT
Start: 2021-06-30 | End: 2021-08-03 | Stop reason: SDUPTHER

## 2021-07-13 ENCOUNTER — OFFICE VISIT (OUTPATIENT)
Dept: INTERNAL MEDICINE CLINIC | Age: 51
End: 2021-07-13
Payer: MEDICARE

## 2021-07-13 DIAGNOSIS — Z79.4 UNCONTROLLED TYPE 2 DIABETES MELLITUS WITH HYPERGLYCEMIA, WITH LONG-TERM CURRENT USE OF INSULIN (HCC): Chronic | ICD-10-CM

## 2021-07-13 DIAGNOSIS — E11.65 UNCONTROLLED TYPE 2 DIABETES MELLITUS WITH HYPERGLYCEMIA, WITH LONG-TERM CURRENT USE OF INSULIN (HCC): Chronic | ICD-10-CM

## 2021-07-13 PROCEDURE — 97802 MEDICAL NUTRITION INDIV IN: CPT | Performed by: DIETITIAN, REGISTERED

## 2021-07-13 NOTE — PATIENT INSTRUCTIONS
1. )  Get familiar with reading the nutrition facts label by looking at serving size and total carbohydrates. - Without a label refer to your carb count guide booklet. 2.)  Measure foods for accuracy in carb counting.    3.)  Healthy carb choices:  whole grains, whole wheat pasta, starchy vegetables, fresh fruit and lowfat/non-fat milk and yogurt. 4.)  Your meal plan is found on the inside cover of your carb counting guide booklet:  1st meal or Brkf:  45-60 gms carbohydrates + 1-2 oz protein  2nd meal or Lunch: 60 gms carbohydrates + 2-3 oz protein + non-starchy veggies  3rd meal or Dinner:  60 gms carbohydrates + 2-3 oz protein + non- starchy veggies    Snack time:  15 - 20 gms carbohydrates + 1 oz protein    5.)  Choose lean protein most of the time and Cut in 1/2 added fats to help with weight loss efforts. 6.) Bring a 1-2 week food log and CGM reader to next dietitian appt. Thanks.   Fasting BS (1st thing in the morning) or before a meal (at least 4 hour since last ate), BS goal:  90 - 130  2 hours after the start of a meal, BS goal:  100 - 150    7.)  Great job keeping active by going to [employer]

## 2021-07-13 NOTE — PROGRESS NOTES
[address] [place] [address]. [address] [place] [address] Don, Jose CruzDrake Ivan, [address]  [phone] (phone)  [phone] (fax)    Patient Name: Rodolfo Stein. Date of Birth: [id number]. MRN: [id number]      Assessment: Patient is a 46 y.o. male seen for Initial MNT visit for Type 2 DB.     -Nutritionally relevant labs:   Lab Results   Component Value Date/Time    LABA1C 9.6 (H) 06/29/2021 07:48 AM    LABA1C 10.2 (H) 03/29/2021 02:44 PM    LABA1C 10.3 (H) 01/22/2021 08:06 AM    LABA1C 9.7 (H) 10/08/2020 11:10 AM    LABA1C 10.9 (H) 07/06/2020 01:24 PM    GLUCOSE 465 (H) 05/12/2021 07:30 PM    GLUCOSE 379 (H) 04/24/2021 10:07 AM    GLUCOSE 114 (H) 12/26/2020 09:39 AM    GLUCOSE 318 (H) 03/17/2020 11:19 AM    CHOL 161 10/10/2018 09:40 AM    HDL 27 10/08/2020 11:10 AM    LDLCALC 72 10/08/2020 11:10 AM    TRIG 235 (H) 02/26/2021 10:53 AM     6/29/21 Dr Gregorio Navarro visit:  Plan:  Recommend optimal blood sugar control is currently taking insulin Humalog 30 units 3 times a day with an increase to 35 units plus add a sliding scale group 2 to the coverage. In addition he is on long-acting insulin, will increase to 75 units twice daily and I instructed him to increase it by 5 units every 10 days in order to obtain a blood sugar of 150- 200  in the morning. I gave him new glucometer meter he can use till he gets the transmitter for Dexcom, he needs to check at least 2-3 times a day and take insulin as directed. He does have uncontrolled diabetes type 2 with several complications    Today's visit:   Pt states he was taking 70 units BID of his long acting insulin - (Corrected pt for Dr Gregorio Navarro instructions above to take 75 units BID)  Pt states he is taking 35 units Humalog 3x/day before meals plus Group 2 ss. Pt states he did not take his short acting insulin this morning d/t BS 90 and he ate 2 bananas. -Blood sugar trends: Dexcom CGM downloaded and reviewed.   Pt c/o having low BS episodes 1-2 x in the past 2 weeks. Time in range:  26%  Very high or high:  73%  Low: <1%    Pt Lives alone and gets groceries 1-2x/mo. -Food recall:   Breakfast: Flavored Oatmeal-2 pkts 1 cup with sliced banana (1), 4 oz. [place] milk, Coke zero (20 oz.)  Lunch: Noon - 1 pm Impossible whopper (1) yesterday. Dinner: 4-5 p, Chicken sausage (2), 1 small bag Doritoes. Snacks: rice snacks-chocolate, caramel, cheddar, organic Doritos- white cheddar     Eats out 2-3x/week eating out - [employer], [employer] - bowl -cauliflower rice and beans. Pt states he is Vegan. Pt states he will eat - egg whites, cottage cheese, fish salmon and tuna,   Pt states he sometimes will eat chicken and beef. Generally no pork products. Pt with no teeth. -Main Beverages: Water-Gallon a day, monster zero-1   [place] milk - unsw   Lactose intolerant - avoids cheese and milk most of the time. He will take Lactaid pills before eating anything with dairy to help with gas. Recently found sugar free ice cream.  He also eats sherbet. -Impression of Dietary Intake: on average, 3 meals per day, on average, 3 fast food meals per week, low fiber. Current Outpatient Medications on File Prior to Visit   Medication Sig Dispense Refill    gabapentin (NEURONTIN) 300 MG capsule TAKE 1 CAPSULE BY MOUTH THREE TIMES DAILY.  90 capsule 2    Insulin Glargine, 2 Unit Dial, 300 UNIT/ML SOPN Change to 75 units BID and every 10 days increase by 5 units both times, to get am sugars 150-200 15 pen 2    blood glucose test strips (ASCENSIA AUTODISC VI;ONE TOUCH ULTRA TEST VI) strip Check sugar 4 times per day For use in One Touch Verio Reflect Meter dx:E11.65 200 each 3    insulin lispro (HUMALOG KWIKPEN U-200) 200 UNIT/ML SOPN pen 30 units Plus scale - Max dose 150 units per day 15 pen 1    Insulin Pen Needle (PEN NEEDLES) 31G X 8 MM MISC For use with insulin injections five timed per day dx:E11.9 150 each 5    sertraline (ZOLOFT) 100 MG tablet Take 1 tablet by mouth daily 30 tablet 5    levothyroxine (SYNTHROID) 50 MCG tablet Take 1 tablet by mouth daily 90 tablet 1    Continuous Blood Gluc Transmit (DEXCOM G6 TRANSMITTER) MISC 1 Device by Does not apply route every 3 months 1 each 3    Continuous Blood Gluc Sensor (DEXCOM G6 SENSOR) MISC 1 Device by Does not apply route every 14 days 9 each 3    Continuous Blood Gluc  (DEXCOM G6 ) LIANNE 1 Device by Does not apply route 4 times daily (before meals and nightly) 1 Device 0    lisinopril (PRINIVIL;ZESTRIL) 5 MG tablet Take 1 tablet by mouth daily 90 tablet 1    empagliflozin (JARDIANCE) 10 MG tablet Take 1 tablet by mouth daily 90 tablet 1    tamsulosin (FLOMAX) 0.4 MG capsule Take 1 capsule by mouth daily Take one capsule daily 30 capsule 11    vitamin D (ERGOCALCIFEROL) 1.25 MG (10665 UT) CAPS capsule Take 1 capsule by mouth once a week Take for 8 weeks. 8 capsule 0    blood glucose monitor kit and supplies Accu Chek Sheila meter. Use to test blood sugars DX:E11.65 1 kit 0    ARIPiprazole (ABILIFY) 5 MG tablet Take 5 mg by mouth daily       fenofibrate (TRICOR) 145 MG tablet 145 mg      metoprolol tartrate (LOPRESSOR) 50 MG tablet Take 1.5 tablets by mouth 2 times daily 90 tablet 3    pantoprazole (PROTONIX) 40 MG tablet Take 40 mg by mouth daily      Lancets MISC Use to test blood sugars 4 times daily DX:E11.65 400 each 5    Insulin Syringe-Needle U-100 29G X 1/2\" 0.3 ML MISC 1 each by Does not apply route 4 times daily (after meals and at bedtime) 200 each 0     No current facility-administered medications on file prior to visit. Vitals from current and previous visits:    Ht. 5'6\"  Act Wt. (6/29/21) 216# (98 kg)  Adj IBW with AKA = 142#  Adj BMI 38.26  Adj Wt. 151# (69 kg)    -Weight goal: lose weight.      Nutrition Diagnosis:   Food and nutrition-related knowledge deficit related to currently undergoing MNT as evidenced by Conditions associated with a diagnosis or treatment: Type 2 DB.         Intervention:  -Impression: Pt somewhat vague with diet recall. Hopefully by bringing in at least a 1 week food log to next appt, it will provide a better representation of what he eats.  -Instructed the patient on: Carbohydrate Counting, Consistent Carbohydrate Intake, Food Label Reading, Meal Planning for Regular, Balanced Meals & Snacks and The Importance of Regular Physical Activity    -Handouts given for: carbohydrate counting, food logging, healthy snacks and fancy plate pics, the sodium content of foods (per pt request), Type 2 DB and Vegetarian guidelines  (from nutrition care manual), 180 gm sample menu. Plant based protein sources & protein exchange list.    Patient Instructions   1.)  Get familiar with reading the nutrition facts label by looking at serving size and total carbohydrates. - Without a label refer to your carb count guide booklet. 2.)  Measure foods for accuracy in carb counting.    3.)  Healthy carb choices:  whole grains, whole wheat pasta, starchy vegetables, fresh fruit and lowfat/non-fat milk and yogurt. 4.)  Your meal plan is found on the inside cover of your carb counting guide booklet:  1st meal or Brkf:  45-60 gms carbohydrates + 1-2 oz protein  2nd meal or Lunch: 60 gms carbohydrates + 2-3 oz protein + non-starchy veggies  3rd meal or Dinner:  60 gms carbohydrates + 2-3 oz protein + non- starchy veggies    Snack time:  15 - 20 gms carbohydrates + 1 oz protein    5.)  Choose lean protein most of the time and Cut in 1/2 added fats to help with weight loss efforts. 6.) Bring a 1-2 week food log and CGM reader to next dietitian appt. Thanks. Fasting BS (1st thing in the morning) or before a meal (at least 4 hour since last ate), BS goal:  90 - 130  2 hours after the start of a meal, BS goal:  100 - 150    7.)  Great job keeping active by going to the gym      -Nutrition prescription: 1500 - 1600 calories/day, 165 - 180 g carbs/day.      Comprehension verified using teachback method. Monitoring/Evaluation:   -Followup visit: 8 weeks with dietitian.   -Receptiveness to education/goals: Agreeable.  -Evaluation of education: Indicates understanding.  -Readiness to change: precontemplative- carb counting his food by reading the nutrition facts label for total carbohydrates and serving size and measuring foods.  -Expected compliance: fair to good. Thank you for your referral of this patient. Total time involved in direct patient education: 60 minutes for initial MNT visit.

## 2021-07-27 ENCOUNTER — HOSPITAL ENCOUNTER (INPATIENT)
Age: 51
LOS: 1 days | Discharge: HOME | DRG: 552 | End: 2021-07-28
Attending: INTERNAL MEDICINE | Admitting: INTERNAL MEDICINE
Payer: MEDICARE

## 2021-07-27 ENCOUNTER — APPOINTMENT (OUTPATIENT)
Dept: GENERAL RADIOLOGY | Age: 51
DRG: 552 | End: 2021-07-27
Payer: MEDICARE

## 2021-07-27 ENCOUNTER — APPOINTMENT (OUTPATIENT)
Dept: ULTRASOUND IMAGING | Age: 51
DRG: 552 | End: 2021-07-27
Payer: MEDICARE

## 2021-07-27 DIAGNOSIS — Z91.89 AT RISK FOR CORONARY ARTERY DISEASE: ICD-10-CM

## 2021-07-27 DIAGNOSIS — E03.9 HYPOTHYROIDISM, UNSPECIFIED TYPE: ICD-10-CM

## 2021-07-27 DIAGNOSIS — R77.8 ELEVATED TROPONIN: Primary | ICD-10-CM

## 2021-07-27 DIAGNOSIS — K21.9 GASTROESOPHAGEAL REFLUX DISEASE, UNSPECIFIED WHETHER ESOPHAGITIS PRESENT: ICD-10-CM

## 2021-07-27 PROBLEM — I21.4 NSTEMI (NON-ST ELEVATED MYOCARDIAL INFARCTION) (HCC): Status: ACTIVE | Noted: 2021-07-27

## 2021-07-27 LAB
ANION GAP SERPL CALCULATED.3IONS-SCNC: 11 MEQ/L (ref 8–16)
BACTERIA: ABNORMAL
BASOPHILS # BLD: 0.7 %
BASOPHILS ABSOLUTE: 0.1 THOU/MM3 (ref 0–0.1)
BILIRUBIN URINE: NEGATIVE
BLOOD, URINE: NEGATIVE
BUN BLDV-MCNC: 21 MG/DL (ref 7–22)
CALCIUM SERPL-MCNC: 9.9 MG/DL (ref 8.5–10.5)
CASTS: ABNORMAL /LPF
CASTS: ABNORMAL /LPF
CHARACTER, URINE: CLEAR
CHLORIDE BLD-SCNC: 98 MEQ/L (ref 98–111)
CO2: 24 MEQ/L (ref 23–33)
COLOR: YELLOW
CREAT SERPL-MCNC: 0.9 MG/DL (ref 0.4–1.2)
CRYSTALS: ABNORMAL
D-DIMER QUANTITATIVE: 459 NG/ML FEU (ref 0–500)
EKG ATRIAL RATE: 99 BPM
EKG P AXIS: 30 DEGREES
EKG P-R INTERVAL: 164 MS
EKG Q-T INTERVAL: 348 MS
EKG QRS DURATION: 76 MS
EKG QTC CALCULATION (BAZETT): 446 MS
EKG R AXIS: 15 DEGREES
EKG T AXIS: 58 DEGREES
EKG VENTRICULAR RATE: 99 BPM
EOSINOPHIL # BLD: 4.5 %
EOSINOPHILS ABSOLUTE: 0.6 THOU/MM3 (ref 0–0.4)
EPITHELIAL CELLS, UA: ABNORMAL /HPF
ERYTHROCYTE [DISTWIDTH] IN BLOOD BY AUTOMATED COUNT: 14.7 % (ref 11.5–14.5)
ERYTHROCYTE [DISTWIDTH] IN BLOOD BY AUTOMATED COUNT: 46.6 FL (ref 35–45)
GFR SERPL CREATININE-BSD FRML MDRD: 88 ML/MIN/1.73M2
GLUCOSE BLD-MCNC: 198 MG/DL (ref 70–108)
GLUCOSE BLD-MCNC: 285 MG/DL (ref 70–108)
GLUCOSE BLD-MCNC: 323 MG/DL (ref 70–108)
GLUCOSE BLD-MCNC: 333 MG/DL (ref 70–108)
GLUCOSE, URINE: >= 1000 MG/DL
HCT VFR BLD CALC: 47.6 % (ref 42–52)
HEMOGLOBIN: 16.2 GM/DL (ref 14–18)
IMMATURE GRANS (ABS): 0.12 THOU/MM3 (ref 0–0.07)
IMMATURE GRANULOCYTES: 0.9 %
KETONES, URINE: NEGATIVE
LEUKOCYTE EST, POC: NEGATIVE
LYMPHOCYTES # BLD: 20.1 %
LYMPHOCYTES ABSOLUTE: 2.7 THOU/MM3 (ref 1–4.8)
MCH RBC QN AUTO: 30.1 PG (ref 26–33)
MCHC RBC AUTO-ENTMCNC: 34 GM/DL (ref 32.2–35.5)
MCV RBC AUTO: 88.3 FL (ref 80–94)
MISCELLANEOUS LAB TEST RESULT: ABNORMAL
MONOCYTES # BLD: 7.1 %
MONOCYTES ABSOLUTE: 1 THOU/MM3 (ref 0.4–1.3)
NITRITE, URINE: NEGATIVE
NUCLEATED RED BLOOD CELLS: 0 /100 WBC
OSMOLALITY CALCULATION: 274.9 MOSMOL/KG (ref 275–300)
PH UA: 5.5 (ref 5–9)
PLATELET # BLD: 263 THOU/MM3 (ref 130–400)
PMV BLD AUTO: 9 FL (ref 9.4–12.4)
POTASSIUM REFLEX MAGNESIUM: 4.6 MEQ/L (ref 3.5–5.2)
PRO-BNP: 21.3 PG/ML (ref 0–900)
PROTEIN UA: ABNORMAL MG/DL
RBC # BLD: 5.39 MILL/MM3 (ref 4.7–6.1)
RBC URINE: ABNORMAL /HPF
RENAL EPITHELIAL, UA: ABNORMAL
SEG NEUTROPHILS: 66.7 %
SEGMENTED NEUTROPHILS ABSOLUTE COUNT: 9 THOU/MM3 (ref 1.8–7.7)
SODIUM BLD-SCNC: 133 MEQ/L (ref 135–145)
SPECIFIC GRAVITY UA: 1.02 (ref 1–1.03)
TROPONIN T: 0.01 NG/ML
TROPONIN T: 0.01 NG/ML
TROPONIN T: < 0.01 NG/ML
TROPONIN T: < 0.01 NG/ML
UROBILINOGEN, URINE: 0.2 EU/DL (ref 0–1)
WBC # BLD: 13.5 THOU/MM3 (ref 4.8–10.8)
WBC UA: ABNORMAL /HPF
YEAST: ABNORMAL

## 2021-07-27 PROCEDURE — 71045 X-RAY EXAM CHEST 1 VIEW: CPT

## 2021-07-27 PROCEDURE — 36415 COLL VENOUS BLD VENIPUNCTURE: CPT

## 2021-07-27 PROCEDURE — 76536 US EXAM OF HEAD AND NECK: CPT

## 2021-07-27 PROCEDURE — 83880 ASSAY OF NATRIURETIC PEPTIDE: CPT

## 2021-07-27 PROCEDURE — 81001 URINALYSIS AUTO W/SCOPE: CPT

## 2021-07-27 PROCEDURE — 84484 ASSAY OF TROPONIN QUANT: CPT

## 2021-07-27 PROCEDURE — 2580000003 HC RX 258: Performed by: INTERNAL MEDICINE

## 2021-07-27 PROCEDURE — G0378 HOSPITAL OBSERVATION PER HR: HCPCS

## 2021-07-27 PROCEDURE — 99283 EMERGENCY DEPT VISIT LOW MDM: CPT

## 2021-07-27 PROCEDURE — 82948 REAGENT STRIP/BLOOD GLUCOSE: CPT

## 2021-07-27 PROCEDURE — 6370000000 HC RX 637 (ALT 250 FOR IP): Performed by: INTERNAL MEDICINE

## 2021-07-27 PROCEDURE — 80048 BASIC METABOLIC PNL TOTAL CA: CPT

## 2021-07-27 PROCEDURE — 93005 ELECTROCARDIOGRAM TRACING: CPT | Performed by: STUDENT IN AN ORGANIZED HEALTH CARE EDUCATION/TRAINING PROGRAM

## 2021-07-27 PROCEDURE — 2140000000 HC CCU INTERMEDIATE R&B

## 2021-07-27 PROCEDURE — 99223 1ST HOSP IP/OBS HIGH 75: CPT | Performed by: INTERNAL MEDICINE

## 2021-07-27 PROCEDURE — 85379 FIBRIN DEGRADATION QUANT: CPT

## 2021-07-27 PROCEDURE — 6370000000 HC RX 637 (ALT 250 FOR IP): Performed by: STUDENT IN AN ORGANIZED HEALTH CARE EDUCATION/TRAINING PROGRAM

## 2021-07-27 PROCEDURE — 85025 COMPLETE CBC W/AUTO DIFF WBC: CPT

## 2021-07-27 RX ORDER — PANTOPRAZOLE SODIUM 40 MG/1
40 TABLET, DELAYED RELEASE ORAL DAILY
Status: DISCONTINUED | OUTPATIENT
Start: 2021-07-27 | End: 2021-07-28 | Stop reason: HOSPADM

## 2021-07-27 RX ORDER — SODIUM CHLORIDE 0.9 % (FLUSH) 0.9 %
5-40 SYRINGE (ML) INJECTION EVERY 12 HOURS SCHEDULED
Status: DISCONTINUED | OUTPATIENT
Start: 2021-07-27 | End: 2021-07-28 | Stop reason: HOSPADM

## 2021-07-27 RX ORDER — DEXTROSE MONOHYDRATE 50 MG/ML
100 INJECTION, SOLUTION INTRAVENOUS PRN
Status: DISCONTINUED | OUTPATIENT
Start: 2021-07-27 | End: 2021-07-28 | Stop reason: HOSPADM

## 2021-07-27 RX ORDER — ARIPIPRAZOLE 5 MG/1
5 TABLET ORAL DAILY
Status: DISCONTINUED | OUTPATIENT
Start: 2021-07-28 | End: 2021-07-28 | Stop reason: HOSPADM

## 2021-07-27 RX ORDER — ERGOCALCIFEROL 1.25 MG/1
50000 CAPSULE ORAL WEEKLY
Status: DISCONTINUED | OUTPATIENT
Start: 2021-08-02 | End: 2021-07-28 | Stop reason: HOSPADM

## 2021-07-27 RX ORDER — DEXTROSE MONOHYDRATE 25 G/50ML
12.5 INJECTION, SOLUTION INTRAVENOUS PRN
Status: DISCONTINUED | OUTPATIENT
Start: 2021-07-27 | End: 2021-07-28 | Stop reason: HOSPADM

## 2021-07-27 RX ORDER — ONDANSETRON 2 MG/ML
4 INJECTION INTRAMUSCULAR; INTRAVENOUS EVERY 6 HOURS PRN
Status: DISCONTINUED | OUTPATIENT
Start: 2021-07-27 | End: 2021-07-28 | Stop reason: HOSPADM

## 2021-07-27 RX ORDER — ATORVASTATIN CALCIUM 40 MG/1
40 TABLET, FILM COATED ORAL NIGHTLY
Status: DISCONTINUED | OUTPATIENT
Start: 2021-07-27 | End: 2021-07-28 | Stop reason: HOSPADM

## 2021-07-27 RX ORDER — ACETAMINOPHEN 650 MG/1
650 SUPPOSITORY RECTAL EVERY 6 HOURS PRN
Status: DISCONTINUED | OUTPATIENT
Start: 2021-07-27 | End: 2021-07-28 | Stop reason: HOSPADM

## 2021-07-27 RX ORDER — ONDANSETRON 4 MG/1
4 TABLET, ORALLY DISINTEGRATING ORAL ONCE
Status: COMPLETED | OUTPATIENT
Start: 2021-07-27 | End: 2021-07-27

## 2021-07-27 RX ORDER — POLYETHYLENE GLYCOL 3350 17 G/17G
17 POWDER, FOR SOLUTION ORAL DAILY PRN
Status: DISCONTINUED | OUTPATIENT
Start: 2021-07-27 | End: 2021-07-28 | Stop reason: HOSPADM

## 2021-07-27 RX ORDER — SODIUM CHLORIDE 0.9 % (FLUSH) 0.9 %
5-40 SYRINGE (ML) INJECTION PRN
Status: DISCONTINUED | OUTPATIENT
Start: 2021-07-27 | End: 2021-07-28 | Stop reason: HOSPADM

## 2021-07-27 RX ORDER — ACETAMINOPHEN 500 MG
1000 TABLET ORAL ONCE
Status: COMPLETED | OUTPATIENT
Start: 2021-07-27 | End: 2021-07-27

## 2021-07-27 RX ORDER — LISINOPRIL 5 MG/1
5 TABLET ORAL DAILY
Status: DISCONTINUED | OUTPATIENT
Start: 2021-07-28 | End: 2021-07-28 | Stop reason: HOSPADM

## 2021-07-27 RX ORDER — NICOTINE POLACRILEX 4 MG
15 LOZENGE BUCCAL PRN
Status: DISCONTINUED | OUTPATIENT
Start: 2021-07-27 | End: 2021-07-28 | Stop reason: HOSPADM

## 2021-07-27 RX ORDER — INSULIN GLARGINE 100 [IU]/ML
75 INJECTION, SOLUTION SUBCUTANEOUS 2 TIMES DAILY
Status: DISCONTINUED | OUTPATIENT
Start: 2021-07-27 | End: 2021-07-28 | Stop reason: HOSPADM

## 2021-07-27 RX ORDER — ACETAMINOPHEN 325 MG/1
650 TABLET ORAL EVERY 6 HOURS PRN
Status: DISCONTINUED | OUTPATIENT
Start: 2021-07-27 | End: 2021-07-28 | Stop reason: HOSPADM

## 2021-07-27 RX ORDER — GABAPENTIN 300 MG/1
300 CAPSULE ORAL 3 TIMES DAILY
Status: DISCONTINUED | OUTPATIENT
Start: 2021-07-27 | End: 2021-07-28 | Stop reason: HOSPADM

## 2021-07-27 RX ORDER — ONDANSETRON 4 MG/1
4 TABLET, ORALLY DISINTEGRATING ORAL EVERY 8 HOURS PRN
Status: DISCONTINUED | OUTPATIENT
Start: 2021-07-27 | End: 2021-07-28 | Stop reason: HOSPADM

## 2021-07-27 RX ORDER — ASPIRIN 81 MG/1
81 TABLET, CHEWABLE ORAL DAILY
Status: DISCONTINUED | OUTPATIENT
Start: 2021-07-28 | End: 2021-07-28 | Stop reason: HOSPADM

## 2021-07-27 RX ORDER — SERTRALINE HYDROCHLORIDE 100 MG/1
100 TABLET, FILM COATED ORAL DAILY
Status: DISCONTINUED | OUTPATIENT
Start: 2021-07-28 | End: 2021-07-28 | Stop reason: HOSPADM

## 2021-07-27 RX ORDER — ASPIRIN 81 MG/1
324 TABLET, CHEWABLE ORAL ONCE
Status: COMPLETED | OUTPATIENT
Start: 2021-07-27 | End: 2021-07-27

## 2021-07-27 RX ORDER — FENOFIBRATE 160 MG/1
160 TABLET ORAL DAILY
Status: DISCONTINUED | OUTPATIENT
Start: 2021-07-27 | End: 2021-07-28 | Stop reason: HOSPADM

## 2021-07-27 RX ORDER — LEVOTHYROXINE SODIUM 0.05 MG/1
50 TABLET ORAL DAILY
Status: DISCONTINUED | OUTPATIENT
Start: 2021-07-28 | End: 2021-07-28 | Stop reason: HOSPADM

## 2021-07-27 RX ORDER — SODIUM CHLORIDE 9 MG/ML
25 INJECTION, SOLUTION INTRAVENOUS PRN
Status: DISCONTINUED | OUTPATIENT
Start: 2021-07-27 | End: 2021-07-28 | Stop reason: HOSPADM

## 2021-07-27 RX ORDER — TAMSULOSIN HYDROCHLORIDE 0.4 MG/1
0.4 CAPSULE ORAL DAILY
Status: DISCONTINUED | OUTPATIENT
Start: 2021-07-28 | End: 2021-07-28 | Stop reason: HOSPADM

## 2021-07-27 RX ADMIN — METOPROLOL TARTRATE 75 MG: 25 TABLET, FILM COATED ORAL at 20:27

## 2021-07-27 RX ADMIN — ONDANSETRON 4 MG: 4 TABLET, ORALLY DISINTEGRATING ORAL at 13:15

## 2021-07-27 RX ADMIN — FENOFIBRATE 160 MG: 160 TABLET ORAL at 20:27

## 2021-07-27 RX ADMIN — INSULIN GLARGINE 75 UNITS: 100 INJECTION, SOLUTION SUBCUTANEOUS at 22:45

## 2021-07-27 RX ADMIN — SODIUM CHLORIDE, PRESERVATIVE FREE 10 ML: 5 INJECTION INTRAVENOUS at 21:23

## 2021-07-27 RX ADMIN — ACETAMINOPHEN 1000 MG: 500 TABLET ORAL at 13:15

## 2021-07-27 RX ADMIN — ASPIRIN 324 MG: 81 TABLET, CHEWABLE ORAL at 14:19

## 2021-07-27 RX ADMIN — GABAPENTIN 300 MG: 300 CAPSULE ORAL at 20:27

## 2021-07-27 RX ADMIN — ATORVASTATIN CALCIUM 40 MG: 40 TABLET, FILM COATED ORAL at 20:27

## 2021-07-27 RX ADMIN — LIDOCAINE HYDROCHLORIDE: 20 SOLUTION ORAL; TOPICAL at 13:15

## 2021-07-27 RX ADMIN — SODIUM CHLORIDE, PRESERVATIVE FREE 10 ML: 5 INJECTION INTRAVENOUS at 20:28

## 2021-07-27 RX ADMIN — ACETAMINOPHEN 650 MG: 325 TABLET ORAL at 20:31

## 2021-07-27 RX ADMIN — PANTOPRAZOLE SODIUM 40 MG: 40 TABLET, DELAYED RELEASE ORAL at 22:53

## 2021-07-27 ASSESSMENT — ENCOUNTER SYMPTOMS
CONSTIPATION: 0
DIARRHEA: 0
NAUSEA: 1
RESPIRATORY NEGATIVE: 1
EYES NEGATIVE: 1
EYE PAIN: 0
ABDOMINAL PAIN: 0
GASTROINTESTINAL NEGATIVE: 1
COUGH: 0
VOMITING: 0
TROUBLE SWALLOWING: 0
BLOOD IN STOOL: 0
SHORTNESS OF BREATH: 0

## 2021-07-27 ASSESSMENT — PAIN DESCRIPTION - LOCATION
LOCATION: EAR;NECK
LOCATION: NECK;EAR
LOCATION: ARM;NECK;LEG

## 2021-07-27 ASSESSMENT — PAIN SCALES - GENERAL
PAINLEVEL_OUTOF10: 10
PAINLEVEL_OUTOF10: 8
PAINLEVEL_OUTOF10: 8
PAINLEVEL_OUTOF10: 10

## 2021-07-27 ASSESSMENT — PAIN DESCRIPTION - FREQUENCY
FREQUENCY: CONTINUOUS
FREQUENCY: INTERMITTENT
FREQUENCY: CONTINUOUS

## 2021-07-27 ASSESSMENT — PAIN DESCRIPTION - PAIN TYPE
TYPE: ACUTE PAIN

## 2021-07-27 ASSESSMENT — PAIN DESCRIPTION - ORIENTATION
ORIENTATION: LEFT

## 2021-07-27 ASSESSMENT — PAIN DESCRIPTION - DESCRIPTORS: DESCRIPTORS: SHARP

## 2021-07-27 ASSESSMENT — PAIN DESCRIPTION - PROGRESSION
CLINICAL_PROGRESSION: GRADUALLY IMPROVING
CLINICAL_PROGRESSION: NOT CHANGED

## 2021-07-27 ASSESSMENT — HEART SCORE: ECG: 0

## 2021-07-27 ASSESSMENT — PAIN DESCRIPTION - DIRECTION: RADIATING_TOWARDS: LEFT SIDE OF BODY

## 2021-07-27 ASSESSMENT — PAIN DESCRIPTION - ONSET
ONSET: ON-GOING
ONSET: ON-GOING

## 2021-07-27 NOTE — ED NOTES
ED to inpatient nurses report    Chief Complaint   Patient presents with    Neck Pain    Leg Pain    Nausea      Present to ED from home  LOC: alert and orientated to name, place, date  Vital signs   Vitals:    07/27/21 1220 07/27/21 1421 07/27/21 1426 07/27/21 1555   BP: (!) 145/83 134/87 (!) 145/75    Pulse: 102 94 94 99   Resp: 16 16 13 16   Temp: 98.3 °F (36.8 °C)      TempSrc: Oral      SpO2: 99% 96% 96%    Weight: 199 lb (90.3 kg)      Height: 5' 6\" (1.676 m)         Oxygen Baseline room air    Current needs required room air Bipap/Cpap No  LDAs:   Peripheral IV 07/27/21 Right Forearm (Active)   Site Assessment Clean; Intact;Dry 07/27/21 1428   Line Status Brisk blood return;Normal saline locked;Specimen collected 07/27/21 1428   Dressing Status Clean;Dry; Intact 07/27/21 1428   Dressing Intervention New 07/27/21 1428     Mobility: Independent  Pending ED orders: none  Present condition: pt resting on cot with unlabored respirations.        Electronically signed by Jt Michel RN on 7/27/2021 at 4:54 PM       [phone] Janes Ramirez RN  07/27/21 0128

## 2021-07-27 NOTE — ED TRIAGE NOTES
Pt presents to the ED via lobby with c/o left sided pain. Pt states the pain is throughout his ear and neck. Pt states pain is also down his left leg and is sharp and aching.  Pt also reports nausea

## 2021-07-27 NOTE — ED PROVIDER NOTES
[place] ENCOUNTER          Pt Name: Rohan Schneider  MRN: [id number]  [place] 1970  Date of evaluation: 7/27/2021  Treating Resident Physician: Александр Holland MD  Supervising Physician: Dr. Boland Medic       Chief Complaint   Patient presents with    Neck Pain    Leg Pain    Nausea     History obtained from chart review and the patient. HISTORY OF PRESENT ILLNESS    HPI    Past Medical History:   Diagnosis Date    Bipolar 2 disorder [facility])     previously followed with Dr. Aleshia Kitchen and Carlos Anthony in [facility] BPH (benign prostatic hyperplasia)     Diabetes mellitus type 2, uncontrolled ([address]     HgbA1c on 4/2/2019 was 9.1.  Diabetic peripheral neuropathy (HCC)     Diabetic polyneuropathy ([address]     Diabetic ulcer of right foot associated with type 2 diabetes mellitus ([address] 12/10/2015    Essential hypertension     \"never been on b/p medication that I know of\"    GERD (gastroesophageal reflux disease)     Hammer toe of left foot     Heart murmur     denies any chest pain or palpitations    History of tobacco abuse     Hx of AKA (above knee amputation), right ([address] 04/18/2019    Dr. Michael Cheng edema, diabetic, bilateral ([address] 05/04/2018    Dr. 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 male with past medical history as above who presents to the emergency department for evaluation of neck pain, leg pain, and nausea. Patient states that his left-sided neck and neck pain started earlier today.   He states that he uses wheelchair and his upper body to transfer to and from the wheelchair. He reports having as aching in quality but does not radiate. He denies any sciatica. He states he has not taken anything for his pain. He reports his diabetes is moderately controlled. He states he has not eaten or had anything to drink today but is experiencing nausea, did not vomit. He does report history of left-sided Achilles tendinopathy with upcoming surgical procedure scheduled. He denies any history of MI or CVA. The patient has no other acute complaints at this time. REVIEW OF SYSTEMS   Review of Systems   Constitutional: Negative for chills, fatigue and fever. HENT: Negative for ear pain, postnasal drip and trouble swallowing. Eyes: Negative for pain and visual disturbance. Respiratory: Negative for cough and shortness of breath. Cardiovascular: Negative for chest pain and palpitations. Gastrointestinal: Positive for nausea. Negative for abdominal pain, blood in stool, constipation, diarrhea and vomiting. Genitourinary: Negative for dysuria. Musculoskeletal: Positive for neck pain. Leg pain   Skin: Negative for rash. Neurological: Negative for headaches. PAST MEDICAL AND SURGICAL HISTORY     Past Medical History:   Diagnosis Date    Bipolar 2 disorder [facility])     previously followed with Dr. Luis Armando Broussard and Talya Campos in [facility] BPH (benign prostatic hyperplasia)     Diabetes mellitus type 2, uncontrolled ([address]     HgbA1c on 4/2/2019 was 9.1.     Diabetic peripheral neuropathy ([address]     Diabetic polyneuropathy ([address]     Diabetic ulcer of right foot associated with type 2 diabetes mellitus ([address] 12/10/2015    Essential hypertension     \"never been on b/p medication that I know of\"    GERD (gastroesophageal reflux disease)     Hammer toe of left foot     Heart murmur     denies any chest pain or palpitations    History of tobacco abuse     Hx of AKA (above knee amputation), right ([facility] 04/18/2019    Dr. Michael Cheng edema, diabetic, bilateral ([address] 05/04/2018    Dr. 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 ([facility] 6/29/2016     Past Surgical History:   Procedure Laterality Date    ABSCESS DRAINAGE Right     foot    AMPUTATION ABOVE KNEE Right 4/18/2019    RIGHT ABOVE KNEE AMPUTATION performed by Kike Linares MD at [address], LAPAROSCOPIC N/A 4/1/2020    ROBOTIC CHOLECYSTECTOMY performed by Farrukh Smith MD at [address] N/A 7/20/2020    CYSTOSCOPY performed by Analisa Jimenez MD at [address] 8/21/2020    Reggie Sensor performed by Analisa Jimenez MD at [address] [facility], DIAGNOSTIC      FOOT DEBRIDEMENT Right 07/01/2016    I & D    INCISION AND DRAINAGE Right 2/18/2019    I&D RIGHT STUMP performed by Kike Linares MD at [address] [facility] Right 07/20/2016    LEG AMPUTATION BELOW KNEE Right 4/4/2019    I&D AND REVISION OF AMPUTATION RIGHT LEG performed by Kike Linares MD at [address] Right 1/14/15    sole of foot I&D    CO DRAIN INFECT SHOULDER BURSA Left 8/18/2017    LEFT SHOULDER INCISION AND DRAINAGE performed by Kike Linares MD at [address] OFFICE/OUTPT [address] [facility] Right 9/20/2018    EXCISIONAL DEBRIDEMENT RIGHT BKA STUMP performed by Mirella Dorado MD at [place] [address] Right 1/16/15    2nd toe with wound vac applied    UPPER GASTROINTESTINAL ENDOSCOPY N/A 3/3/2020    EGD DILATION SAVORY performed by Hilda Munoz MD at [facility]  ? when MEDICATIONS     Current Facility-Administered Medications:     ARIPiprazole (ABILIFY) tablet 5 mg, 5 mg, Oral, Daily, Tyrone Sanders MD    empagliflozin (JARDIANCE) tablet TABS 10 mg, 1 tablet, Oral, Daily, Tyrone Sanders MD    fenofibrate (TRIGLIDE) tablet 160 mg, 160 mg, Oral, Daily, Kayley Saldana MD    gabapentin (NEURONTIN) capsule 300 mg, 300 mg, Oral, TID, Tyrone Sanders MD    Insulin Glargine (2 Unit Dial) SOPN 75 Units, 75 Units, Subcutaneous, BID, Tyrone Sanders MD    levothyroxine (SYNTHROID) tablet 50 mcg, 50 mcg, Oral, Daily, Tyrone Sanders MD    lisinopril (PRINIVIL;ZESTRIL) tablet 5 mg, 5 mg, Oral, Daily, Kayley Saldana MD    metoprolol tartrate (LOPRESSOR) tablet 75 mg, 75 mg, Oral, BID, Tyrone Sanders MD    pantoprazole (PROTONIX) tablet 40 mg, 40 mg, Oral, Daily, Tyrone Sanders MD    sertraline (ZOLOFT) tablet 100 mg, 100 mg, Oral, Daily, Kayley Saldana MD    tamsulosin (FLOMAX) capsule 0.4 mg, 0.4 mg, Oral, Daily, Kayley Saldana MD    vitamin D (ERGOCALCIFEROL) capsule 50,000 Units, 50,000 Units, Oral, Weekly, Kayley Saldana MD    sodium chloride flush 0.9 % injection 5-40 mL, 5-40 mL, Intravenous, 2 times per day, Kayley Saldana MD    sodium chloride flush 0.9 % injection 5-40 mL, 5-40 mL, Intravenous, PRN, Kayley Saldana MD    0.9 % sodium chloride infusion, 25 mL, Intravenous, PRN, Kayley Saldana MD    ondansetron (ZOFRAN-ODT) disintegrating tablet 4 mg, 4 mg, Oral, Q8H PRN **OR** ondansetron (ZOFRAN) injection 4 mg, 4 mg, Intravenous, Q6H PRN, Kayley Saldana MD    acetaminophen (TYLENOL) tablet 650 mg, 650 mg, Oral, Q6H PRN **OR** acetaminophen (TYLENOL) suppository 650 mg, 650 mg, Rectal, Q6H PRN, Kayley Saldana MD    polyethylene glycol (GLYCOLAX) packet 17 g, 17 g, Oral, Daily PRN, Kayley Saldana MD    [START ON 7/28/2021] aspirin chewable tablet 81 mg, 81 mg, Oral, Daily, Tyrone Sanders MD    atorvastatin (LIPITOR) tablet 40 mg, 40 mg, Oral, Nightly, Kayley Saldana MD    enoxaparin (LOVENOX) injection 40 mg, 40 mg, Subcutaneous, Daily, Tyrone Sanders MD    insulin lispro (HUMALOG) injection vial 0-18 Units, 0-18 Units, Subcutaneous, TID WC, Tyrone Sanders MD    insulin lispro (HUMALOG) injection vial 0-9 Units, 0-9 Units, Subcutaneous, Nightly, Tyrone Hoffman MD      SOCIAL HISTORY     Social History     Social History Narrative    Not on file     Social History     Tobacco Use    Smoking status: Former Smoker     Packs/day: 1.00     Years: 10.00     Pack years: 10.00     Types: Cigars     Start date: 1985     Quit date:      Years since quittin.9    Smokeless tobacco: Never Used   Vaping Use    Vaping Use: Every day    Substances: Nicotine, Flavoring   Substance Use Topics    Alcohol use: Not Currently     Alcohol/week: 0.0 standard drinks    Drug use: No     Comment: 30 YEARS AGO         ALLERGIES     Allergies   Allergen Reactions    Pcn [Penicillins] Shortness Of Breath, Nausea And Vomiting and Other (See Comments)     Does not remember reactions. Has TOLERATED amoxicillin and several different cephalosporins.  Actos [Pioglitazone] Swelling    Clindamycin/Lincomycin Itching    Vancomycin Hives      Rash, with erythematous plaques to abdomen, shoulders, and proximal upper extremities with pruritis, persisted with 2nd dose administered slower.  Metformin And Related Swelling         FAMILY HISTORY     Family History   Problem Relation Age of Onset    Diabetes Mother     Other Mother         pneumonia, H1N1    Depression Mother     Early Death Mother     High Blood Pressure Mother     High Cholesterol Mother     Vision Loss Maternal Grandmother     Arthritis Maternal Grandfather     Heart Disease Maternal Grandfather          PREVIOUS RECORDS   Previous records reviewed: noted previous ED visits.         PHYSICAL EXAM     ED Triage Vitals [21 1220]   BP Temp Temp Source Pulse Resp SpO2 Height Weight   (!) 145/83 98.3 °F (36.8 °C) Oral 102 16 99 % 5' 6\" (1.676 m) 199 lb (90.3 kg)     Initial vital signs and nursing assessment reviewed and normal. Pulsoximetry is normal per my interpretation. Additional Vital Signs:  Vitals:    07/27/21 1700   BP: 138/88   Pulse: 99   Resp: 16   Temp: 98.4 °F (36.9 °C)   SpO2: 98%       Physical Exam  Vitals and nursing note reviewed. Constitutional:       General: He is not in acute distress. Appearance: He is well-developed. He is not diaphoretic. HENT:      Head: Normocephalic and atraumatic. Right Ear: External ear normal.      Left Ear: External ear normal.      Nose: Nose normal.   Eyes:      General: No scleral icterus. Right eye: No discharge. Left eye: No discharge. Conjunctiva/sclera: Conjunctivae normal.   Cardiovascular:      Rate and Rhythm: Normal rate and regular rhythm. Heart sounds: Normal heart sounds. No murmur heard. Pulmonary:      Effort: Pulmonary effort is normal.      Breath sounds: Normal breath sounds. Musculoskeletal:      Cervical back: Normal range of motion. Comments: Left AKA is present. Skin:     General: Skin is warm and dry. Findings: No erythema or rash. Neurological:      Mental Status: He is alert and oriented to person, place, and time. Cranial Nerves: No cranial nerve deficit. Comments: Neuro exam is normal and symmetrical bilaterally including strength and sensation of bilateral lower extremities (with the exception of left AKA). Psychiatric:         Behavior: Behavior normal.         Thought Content: Thought content normal.         Judgment: Judgment normal.             MEDICAL DECISION MAKING   Initial Assessment: Differential diagnosis includes musculoskeletal pain versus cardiac etiology considering left arm pain. Plan: labs as below, CXR, EKG. GI cocktail, zofran.         ED RESULTS   Laboratory results:  Labs Reviewed   CBC WITH AUTO DIFFERENTIAL - Abnormal; Notable for the following components:       Result Value    WBC 13.5 (*)     RDW-CV 14.7 (*)     RDW-SD 46.6 (*)     MPV 9.0 (*)     Segs Absolute 9.0 (*)     Eosinophils Absolute 0.6 (*)     Immature Grans (Abs) 0.12 (*)     All other components within normal limits   BASIC METABOLIC PANEL W/ REFLEX TO MG FOR LOW K - Abnormal; Notable for the following components:    Sodium 133 (*)     Glucose 198 (*)     All other components within normal limits   TROPONIN - Abnormal; Notable for the following components:    Troponin T 0.012 (*)     All other components within normal limits   OSMOLALITY - Abnormal; Notable for the following components:    Osmolality Calc 274.9 (*)     All other components within normal limits   GLOMERULAR FILTRATION RATE, ESTIMATED - Abnormal; Notable for the following components:    Est, Glom Filt Rate 88 (*)     All other components within normal limits   TROPONIN - Abnormal; Notable for the following components:    Troponin T 0.010 (*)     All other components within normal limits   BRAIN NATRIURETIC PEPTIDE   D-DIMER, QUANTITATIVE   ANION GAP   URINALYSIS   TROPONIN   TROPONIN   POCT GLUCOSE       Radiologic studies results:  XR CHEST PORTABLE   Final Result   No acute cardiopulmonary disease. **This report has been created using voice recognition software. It may contain minor errors which are inherent in voice recognition technology. **      Final report electronically signed by Dr. Briana Larson on 7/27/2021 1:33 PM          ED Medications administered this visit:   Medications   ARIPiprazole (ABILIFY) tablet 5 mg (has no administration in time range)   empagliflozin (JARDIANCE) tablet TABS 10 mg (has no administration in time range)   fenofibrate (TRIGLIDE) tablet 160 mg (has no administration in time range)   gabapentin (NEURONTIN) capsule 300 mg (has no administration in time range)   Insulin Glargine (2 Unit Dial) SOPN 75 Units (has no administration in time range)   levothyroxine (SYNTHROID) tablet 50 mcg (has no administration in time range)   lisinopril (PRINIVIL;ZESTRIL) tablet 5 mg (has no administration in time range)   metoprolol tartrate (LOPRESSOR) tablet 75 mg (has no administration in time range)   pantoprazole (PROTONIX) tablet 40 mg (has no administration in time range)   sertraline (ZOLOFT) tablet 100 mg (has no administration in time range)   tamsulosin (FLOMAX) capsule 0.4 mg (has no administration in time range)   vitamin D (ERGOCALCIFEROL) capsule 50,000 Units (has no administration in time range)   sodium chloride flush 0.9 % injection 5-40 mL (has no administration in time range)   sodium chloride flush 0.9 % injection 5-40 mL (has no administration in time range)   0.9 % sodium chloride infusion (has no administration in time range)   ondansetron (ZOFRAN-ODT) disintegrating tablet 4 mg (has no administration in time range)     Or   ondansetron (ZOFRAN) injection 4 mg (has no administration in time range)   acetaminophen (TYLENOL) tablet 650 mg (has no administration in time range)     Or   acetaminophen (TYLENOL) suppository 650 mg (has no administration in time range)   polyethylene glycol (GLYCOLAX) packet 17 g (has no administration in time range)   aspirin chewable tablet 81 mg (has no administration in time range)   atorvastatin (LIPITOR) tablet 40 mg (has no administration in time range)   enoxaparin (LOVENOX) injection 40 mg (has no administration in time range)   insulin lispro (HUMALOG) injection vial 0-18 Units (has no administration in time range)   insulin lispro (HUMALOG) injection vial 0-9 Units (has no administration in time range)   ondansetron (ZOFRAN-ODT) disintegrating tablet 4 mg (4 mg Oral Given 7/27/21 1315)   acetaminophen (TYLENOL) tablet 1,000 mg (1,000 mg Oral Given 7/27/21 1315)   aluminum & magnesium hydroxide-simethicone (MAALOX) 30 mL, lidocaine viscous hcl (XYLOCAINE) 5 mL (GI COCKTAIL) ( Oral Given 7/27/21 1315)   aspirin chewable tablet 324 mg (324 mg Oral Given 7/27/21 1419)         ED COURSE Although initial impression of patient's left-sided arm and leg pain appear to be of musculoskeletal etiology, cardiac work-up in the ED (considering patient's risk factors) revealed troponin elevation. No EKG changes in the ED, renal function is within normal limits. Second run and done 2 hours after the first was also elevated although the value was decreased as compared to the first troponin. Patient was initially given GI cocktail due to complaints of heartburn and later given 324 mg loading dose of aspirin considering troponin elevation. HEART score of 5. Plan at this time is to have the patient admitted for further cardiac monitoring. Plan of care discussed with the patient who is amenable to admission; questions answered. Report given to hospitalist who accepts the patient for admission. Patient is being admitted in stable condition at this time. MEDICATION CHANGES     Current Discharge Medication List            FINAL DISPOSITION     Final diagnoses:   Elevated troponin   At risk for coronary artery disease   Gastroesophageal reflux disease, unspecified whether esophagitis present     Condition: condition: stable  Dispo: admit      This transcription was electronically signed. Parts of this transcriptions may have been dictated by use of voice recognition software and electronically transcribed, and parts may have been transcribed with the assistance of an ED scribe. The transcription may contain errors not detected in proofreading. Please refer to my supervising physician's documentation if my documentation differs.     Electronically Signed: Mary Ann Brody MD, 07/27/21, 5:47 PM       Mary Ann Brody MD  Resident  07/27/21 1351

## 2021-07-27 NOTE — PROGRESS NOTES
Pt admitted to  3B30 from ED. Complaints: left sided neck pain, leg pain. IV site free of s/s of infection or infiltration. Vital signs obtained. Assessment and data collection initiated. Two nurse skin assessment performed by Christina El RN and Mickey Maravilla RN. Oriented to room. Policies and procedures for 3B explained. Christina El RN discussed hourly rounding with patient addressing 5 P's. Fall prevention and safety brochure discussed with patient. Bed alarm on. Call light in reach. The best day to schedule a follow up Dr appointment is: anytime. Explained patients right to have family, representative or physician notified of their admission. Patient has Declined for physician to be notified. Patient has Declined for family/representative to be notified. All questions answered with no further questions at this time.

## 2021-07-27 NOTE — H&P
HISTORY AND PHYSICAL             Date: 7/27/2021        Patient Name: Den Quiroz     YOB: 1970      Age:  46 y.o. Chief Complaint     Chief Complaint   Patient presents with    Neck Pain    Leg Pain    Nausea        History Obtained From   patient    History of Present Illness   Patient is a 57-year-old morbidly obese with poorly controlled diabetes, high risk for an acute cardiac event presents with c/o left sided pain. Pt states the pain is throughout his ear and neck. Pt states pain is also down his left leg and is sharp and aching. Pt also reports nausea  On routine testing in the emergency department patient was noted to have a mild troponin elevation, however there are no EKG changes patient denies any ongoing chest pain chest x-ray is negative for any mediastinal widening however in view of his being high risk for coronary artery disease will admit to monitor closely    Past Medical History     Past Medical History:   Diagnosis Date    Bipolar 2 disorder ([facility]     previously followed with Dr. Dorene Bravo and Rick Loja in [facility] BPH (benign prostatic hyperplasia)     Diabetes mellitus type 2, uncontrolled ([facility]     HgbA1c on 4/2/2019 was 9.1.     Diabetic peripheral neuropathy (HCC)     Diabetic polyneuropathy ([address]     Diabetic ulcer of right foot associated with type 2 diabetes mellitus ([address] 12/10/2015    Essential hypertension     \"never been on b/p medication that I know of\"    GERD (gastroesophageal reflux disease)     Hammer toe of left foot     Heart murmur     denies any chest pain or palpitations    History of tobacco abuse     Hx of AKA (above knee amputation), right ([address] 04/18/2019    Dr. Guerrero Room Macular edema, diabetic, bilateral ([address] 05/04/2018    Dr. Alphonse Arvizu referred to retina specialist for 2nd opinion    Marijuana abuse in remission     Melena     MRSA (methicillin resistant staph aureus) culture positive     Onychomycosis     Other disorders of kidney and ureter in diseases classified elsewhere     Sleep apnea     no cpap    Thyroid disease     WD-Skin ulcer of fourth toe of right foot with necrosis of bone ([facility] 6/29/2016        Past Surgical History     Past Surgical History:   Procedure Laterality Date    ABSCESS DRAINAGE Right     foot    AMPUTATION ABOVE KNEE Right 4/18/2019    RIGHT ABOVE KNEE AMPUTATION performed by Luís Mix MD at [address] [facility], LAPAROSCOPIC N/A 4/1/2020    ROBOTIC CHOLECYSTECTOMY performed by Maryann Madrigal MD at [place] [address] N/A 7/20/2020    CYSTOSCOPY [facility] by Ashley Ware MD [facility] [address] [facility] 8/21/2020    CYSTOSCOPY, UROLIFT performed by Ashley Ware MD at [address] [facility], DIAGNOSTIC      FOOT DEBRIDEMENT Right 07/01/2016    I & D    INCISION AND DRAINAGE Right 2/18/2019    I&D RIGHT STUMP performed by Luís Mix MD at [address] [facility] Right 07/20/2016    LEG AMPUTATION BELOW KNEE Right 4/4/2019    I&D AND REVISION OF AMPUTATION RIGHT LEG performed by Luís Mix MD at [facility] [employer] Right 1/14/15    sole of foot I&D    KS DRAIN INFECT SHOULDER BURSA Left 8/18/2017    LEFT SHOULDER INCISION AND DRAINAGE performed by Luís Mix MD at [address] [facility] OFFICE/OUTPT [address] [place] [address] Right 9/20/2018    EXCISIONAL DEBRIDEMENT RIGHT BKA STUMP performed by Nelsy Cheney MD at [address] Right 1/16/15    2nd toe with wound vac applied    UPPER GASTROINTESTINAL ENDOSCOPY N/A 3/3/2020    EGD DILATION SAVORY performed by Dominga Stuton MD at [facility]  ? when        Medications Prior to Admission     Prior to Admission medications    Medication Sig Start Date End Date Taking? Authorizing Provider   gabapentin (NEURONTIN) 300 MG capsule TAKE 1 CAPSULE BY MOUTH THREE TIMES DAILY.  6/30/21 9/30/21  Pedro Etienne MD   Insulin Glargine, 2 Unit Dial, 300 UNIT/ML SOPN Change to 75 units BID and every 10 days increase by 5 units both times, to get am sugars 150-200 6/29/21   Phuc Ortega MD   blood glucose test strips (ASCENSIA AUTODISC VI;ONE TOUCH ULTRA TEST VI) strip Check sugar 4 times per day For use in One Touch Verio Reflect Meter dx:E11.65 6/29/21   Phuc Ortega MD   insulin lispro (HUMALOG KWIKPEN U-200) 200 UNIT/ML SOPN pen 30 units Plus scale - Max dose 150 units per day 5/14/21   Yuko Frias MD   Insulin Pen Needle (PEN NEEDLES) 31G X 8 MM MISC For use with insulin injections five timed per day dx:E11.9 4/19/21   MARILYN Bradford CNP   sertraline (ZOLOFT) 100 MG tablet Take 1 tablet by mouth daily 4/19/21   MARILYN Bradford CNP   levothyroxine (SYNTHROID) 50 MCG tablet Take 1 tablet by mouth daily 4/19/21   MARILYN Bradford CNP   Continuous Blood Gluc Transmit (DEXCOM G6 TRANSMITTER) MISC 1 Device by Does not apply route every 3 months 3/22/21   MARILYN Bradford CNP   Continuous Blood Gluc Sensor (DEXCOM G6 SENSOR) MISC 1 Device by Does not apply route every 14 days 3/22/21   MARILYN Bradford CNP   Continuous Blood Gluc  (DEXCOM G6 ) LIANNE 1 Device by Does not apply route 4 times daily (before meals and nightly) 3/22/21   MARILYN Bradford CNP   lisinopril (PRINIVIL;ZESTRIL) 5 MG tablet Take 1 tablet by mouth daily 3/17/21   MARILYN Bradford CNP   empagliflozin (JARDIANCE) 10 MG tablet Take 1 tablet by mouth daily 3/17/21   MARILYN Bradford CNP   tamsulosin (FLOMAX) 0.4 MG capsule Take 1 capsule by mouth daily Take one capsule daily 3/17/21   MARILYN Tong CNP   vitamin D (ERGOCALCIFEROL) 1.25 MG (40404 UT) CAPS capsule Take 1 capsule by mouth once a week Take for 8 weeks. 3/3/21   MARILYN Bradford - CNP   blood glucose monitor kit and supplies Accu Chek Sheila meter.  Use to test blood sugars DX:E11.65 1/22/21   Quintin Reynaga LOIS AnguianoN - CNP   ARIPiprazole (ABILIFY) 5 MG tablet Take 5 mg by mouth daily  12/16/20   Historical Provider, MD   fenofibrate (TRICOR) 145 MG tablet 145 mg 12/26/20   Historical Provider, MD   metoprolol tartrate (LOPRESSOR) 50 MG tablet Take 1.5 tablets by mouth 2 times daily 12/4/20   Jani Broderick MD   pantoprazole (PROTONIX) 40 MG tablet Take 40 mg by mouth daily 10/2/20   Historical Provider, MD   Lancets MISC Use to test blood sugars 4 times daily DX:E11.65 9/14/20   Jose Shin APRN - CNP   Insulin Syringe-Needle U-100 29G X 1/2\" 0.3 ML MISC 1 each by Does not apply route 4 times daily (after meals and at bedtime) 4/30/19   Smitha Bender MD        Allergies   Pcn [penicillins], Actos [pioglitazone], Clindamycin/lincomycin, Vancomycin, and Metformin and related    Social History     Social History     Tobacco History     Smoking Status  Former Smoker Smoking Start Date  1/23/1985 Quit date  8/26/2000 Smoking Frequency  1 pack/day for 10 years (10 pk yrs)    Smoking Tobacco Type  Cigars    Smokeless Tobacco Use  Never Used          Alcohol History     Alcohol Use Status  Not Currently Drinks/Week  0 Glasses of wine, 0 Cans of beer, 0 Shots of liquor, 0 Standard drinks or equivalent per week Amount  0.0 standard drinks of alcohol/wk          Drug Use     Drug Use Status  No Comment  30 YEARS AGO          Sexual Activity     Sexually Active  Yes Partners  Female                Family History     Family History   Problem Relation Age of Onset    Diabetes Mother     Other Mother         pneumonia, H1N1    Depression Mother     Early Death Mother     High Blood Pressure Mother     High Cholesterol Mother     Vision Loss Maternal Grandmother     Arthritis Maternal Grandfather     Heart Disease Maternal Grandfather        Review of Systems   Review of Systems   Constitutional: Negative. HENT: Negative. Eyes: Negative. Respiratory: Negative. Cardiovascular: Negative. Gastrointestinal: Negative. Endocrine: Negative. Genitourinary: Negative. Musculoskeletal: Positive for neck pain. Skin: Negative. Neurological: Negative. Hematological: Negative. Psychiatric/Behavioral: Negative. Physical Exam   BP (!) 145/75   Pulse 94   Temp 98.3 °F (36.8 °C) (Oral)   Resp 13   Ht 5' 6\" (1.676 m)   Wt 199 lb (90.3 kg)   SpO2 96%   BMI 32.12 kg/m²     Physical Exam  Constitutional:       Appearance: He is obese. HENT:      Head: Normocephalic and atraumatic. Right Ear: External ear normal.      Left Ear: External ear normal.      Nose: Nose normal.      Mouth/Throat:      Mouth: Mucous membranes are moist.      Pharynx: Oropharynx is clear. Eyes:      Conjunctiva/sclera: Conjunctivae normal.      Pupils: Pupils are equal, round, and reactive to light. Neck:      Vascular: No carotid bruit. Cardiovascular:      Rate and Rhythm: Normal rate. Pulses: Normal pulses. Pulmonary:      Effort: Pulmonary effort is normal.   Abdominal:      General: Bowel sounds are normal.      Palpations: Abdomen is soft. Musculoskeletal:         General: Normal range of motion. Cervical back: Normal range of motion and neck supple. No rigidity or tenderness. Comments: A/K Amputation R leg noted    Lymphadenopathy:      Cervical: No cervical adenopathy. Skin:     General: Skin is warm. Capillary Refill: Capillary refill takes less than 2 seconds. Neurological:      General: No focal deficit present. Mental Status: He is alert.    Psychiatric:         Mood and Affect: Mood normal.         Labs      Recent Results (from the past 24 hour(s))   CBC Auto Differential    Collection Time: 07/27/21  1:14 PM   Result Value Ref Range    WBC 13.5 (H) 4.8 - 10.8 thou/mm3    RBC 5.39 4.70 - 6.10 mill/mm3    Hemoglobin 16.2 14.0 - 18.0 gm/dl    Hematocrit 47.6 42.0 - 52.0 %    MCV 88.3 80.0 - 94.0 fL    MCH 30.1 26.0 - 33.0 pg    MCHC 34.0 32.2 - 35.5

## 2021-07-27 NOTE — ED NOTES
Pt given box lunch at this time. Pt resting on cot with no signs of distress. Pt denies any needs at this time.       Mendel Pro WPARVK, RN  07/27/21 8071

## 2021-07-28 ENCOUNTER — APPOINTMENT (OUTPATIENT)
Dept: CT IMAGING | Age: 51
DRG: 552 | End: 2021-07-28
Payer: MEDICARE

## 2021-07-28 VITALS
HEIGHT: 66 IN | WEIGHT: 231.48 LBS | TEMPERATURE: 98.1 F | BODY MASS INDEX: 37.2 KG/M2 | OXYGEN SATURATION: 96 % | RESPIRATION RATE: 16 BRPM | SYSTOLIC BLOOD PRESSURE: 112 MMHG | DIASTOLIC BLOOD PRESSURE: 55 MMHG | HEART RATE: 71 BPM

## 2021-07-28 PROBLEM — I21.4 NSTEMI (NON-ST ELEVATED MYOCARDIAL INFARCTION) (HCC): Status: RESOLVED | Noted: 2021-07-27 | Resolved: 2021-07-28

## 2021-07-28 LAB
ALBUMIN SERPL-MCNC: 3.9 G/DL (ref 3.5–5.1)
ALP BLD-CCNC: 79 U/L (ref 38–126)
ALT SERPL-CCNC: 14 U/L (ref 11–66)
ANION GAP SERPL CALCULATED.3IONS-SCNC: 11 MEQ/L (ref 8–16)
AST SERPL-CCNC: 20 U/L (ref 5–40)
BILIRUB SERPL-MCNC: 0.3 MG/DL (ref 0.3–1.2)
BILIRUBIN DIRECT: < 0.2 MG/DL (ref 0–0.3)
BUN BLDV-MCNC: 25 MG/DL (ref 7–22)
CALCIUM SERPL-MCNC: 9.7 MG/DL (ref 8.5–10.5)
CHLORIDE BLD-SCNC: 96 MEQ/L (ref 98–111)
CO2: 26 MEQ/L (ref 23–33)
CREAT SERPL-MCNC: 1 MG/DL (ref 0.4–1.2)
ERYTHROCYTE [DISTWIDTH] IN BLOOD BY AUTOMATED COUNT: 14.6 % (ref 11.5–14.5)
ERYTHROCYTE [DISTWIDTH] IN BLOOD BY AUTOMATED COUNT: 47.2 FL (ref 35–45)
GFR SERPL CREATININE-BSD FRML MDRD: 78 ML/MIN/1.73M2
GLUCOSE BLD-MCNC: 210 MG/DL (ref 70–108)
GLUCOSE BLD-MCNC: 211 MG/DL (ref 70–108)
GLUCOSE BLD-MCNC: 227 MG/DL (ref 70–108)
GLUCOSE BLD-MCNC: 244 MG/DL (ref 70–108)
GLUCOSE BLD-MCNC: 248 MG/DL (ref 70–108)
HCT VFR BLD CALC: 43.9 % (ref 42–52)
HEMOGLOBIN: 14.8 GM/DL (ref 14–18)
MAGNESIUM: 2.1 MG/DL (ref 1.6–2.4)
MCH RBC QN AUTO: 30.1 PG (ref 26–33)
MCHC RBC AUTO-ENTMCNC: 33.7 GM/DL (ref 32.2–35.5)
MCV RBC AUTO: 89.4 FL (ref 80–94)
PLATELET # BLD: 282 THOU/MM3 (ref 130–400)
PMV BLD AUTO: 9.4 FL (ref 9.4–12.4)
POTASSIUM REFLEX MAGNESIUM: 4.2 MEQ/L (ref 3.5–5.2)
RBC # BLD: 4.91 MILL/MM3 (ref 4.7–6.1)
SODIUM BLD-SCNC: 133 MEQ/L (ref 135–145)
TOTAL PROTEIN: 7.5 G/DL (ref 6.1–8)
WBC # BLD: 14.3 THOU/MM3 (ref 4.8–10.8)

## 2021-07-28 PROCEDURE — G0378 HOSPITAL OBSERVATION PER HR: HCPCS

## 2021-07-28 PROCEDURE — 6360000002 HC RX W HCPCS: Performed by: INTERNAL MEDICINE

## 2021-07-28 PROCEDURE — 82948 REAGENT STRIP/BLOOD GLUCOSE: CPT

## 2021-07-28 PROCEDURE — 93005 ELECTROCARDIOGRAM TRACING: CPT | Performed by: INTERNAL MEDICINE

## 2021-07-28 PROCEDURE — 6370000000 HC RX 637 (ALT 250 FOR IP): Performed by: FAMILY MEDICINE

## 2021-07-28 PROCEDURE — 80076 HEPATIC FUNCTION PANEL: CPT

## 2021-07-28 PROCEDURE — 99239 HOSP IP/OBS DSCHRG MGMT >30: CPT | Performed by: INTERNAL MEDICINE

## 2021-07-28 PROCEDURE — 6370000000 HC RX 637 (ALT 250 FOR IP): Performed by: INTERNAL MEDICINE

## 2021-07-28 PROCEDURE — 80048 BASIC METABOLIC PNL TOTAL CA: CPT

## 2021-07-28 PROCEDURE — 2580000003 HC RX 258: Performed by: INTERNAL MEDICINE

## 2021-07-28 PROCEDURE — 36415 COLL VENOUS BLD VENIPUNCTURE: CPT

## 2021-07-28 PROCEDURE — 83735 ASSAY OF MAGNESIUM: CPT

## 2021-07-28 PROCEDURE — 85027 COMPLETE CBC AUTOMATED: CPT

## 2021-07-28 PROCEDURE — 72125 CT NECK SPINE W/O DYE: CPT

## 2021-07-28 RX ORDER — M-VIT,TX,IRON,MINS/CALC/FOLIC 27MG-0.4MG
1 TABLET ORAL DAILY
COMMUNITY
End: 2022-01-18 | Stop reason: ALTCHOICE

## 2021-07-28 RX ORDER — LIDOCAINE 4 G/G
1 PATCH TOPICAL DAILY
Status: DISCONTINUED | OUTPATIENT
Start: 2021-07-28 | End: 2021-07-28 | Stop reason: HOSPADM

## 2021-07-28 RX ORDER — OXYCODONE HYDROCHLORIDE AND ACETAMINOPHEN 5; 325 MG/1; MG/1
1 TABLET ORAL EVERY 6 HOURS PRN
Status: DISCONTINUED | OUTPATIENT
Start: 2021-07-28 | End: 2021-07-28 | Stop reason: HOSPADM

## 2021-07-28 RX ORDER — NITROGLYCERIN 0.3 MG/1
0.3 TABLET SUBLINGUAL EVERY 5 MIN PRN
Qty: 30 TABLET | Refills: 3 | Status: SHIPPED | OUTPATIENT
Start: 2021-07-28 | End: 2021-08-16 | Stop reason: SDUPTHER

## 2021-07-28 RX ORDER — METOPROLOL TARTRATE 50 MG/1
50 TABLET, FILM COATED ORAL 2 TIMES DAILY
Qty: 90 TABLET | Refills: 3 | Status: SHIPPED | OUTPATIENT
Start: 2021-07-28 | End: 2022-04-26 | Stop reason: SDUPTHER

## 2021-07-28 RX ORDER — DOXYCYCLINE HYCLATE 100 MG
100 TABLET ORAL 2 TIMES DAILY
Qty: 20 TABLET | Refills: 0 | Status: SHIPPED | OUTPATIENT
Start: 2021-07-28 | End: 2021-08-07

## 2021-07-28 RX ORDER — ATORVASTATIN CALCIUM 40 MG/1
40 TABLET, FILM COATED ORAL NIGHTLY
Qty: 30 TABLET | Refills: 3 | Status: SHIPPED | OUTPATIENT
Start: 2021-07-28 | End: 2022-04-26 | Stop reason: SDUPTHER

## 2021-07-28 RX ORDER — 0.9 % SODIUM CHLORIDE 0.9 %
500 INTRAVENOUS SOLUTION INTRAVENOUS ONCE
Status: COMPLETED | OUTPATIENT
Start: 2021-07-28 | End: 2021-07-28

## 2021-07-28 RX ORDER — FENOFIBRATE 145 MG/1
145 TABLET, COATED ORAL DAILY
Qty: 30 TABLET | Refills: 3 | Status: SHIPPED | OUTPATIENT
Start: 2021-07-28 | End: 2022-04-26

## 2021-07-28 RX ORDER — LEVOTHYROXINE SODIUM 0.05 MG/1
50 TABLET ORAL DAILY
Qty: 90 TABLET | Refills: 1 | Status: SHIPPED | OUTPATIENT
Start: 2021-07-28 | End: 2022-04-26 | Stop reason: SDUPTHER

## 2021-07-28 RX ORDER — LIDOCAINE 4 G/G
1 PATCH TOPICAL DAILY
Qty: 30 PATCH | Refills: 0 | Status: SHIPPED | OUTPATIENT
Start: 2021-07-28 | End: 2021-08-16

## 2021-07-28 RX ORDER — CYCLOBENZAPRINE HCL 10 MG
10 TABLET ORAL ONCE
Status: COMPLETED | OUTPATIENT
Start: 2021-07-28 | End: 2021-07-28

## 2021-07-28 RX ORDER — ASPIRIN 81 MG/1
81 TABLET, CHEWABLE ORAL DAILY
Qty: 30 TABLET | Refills: 3 | Status: ON HOLD | OUTPATIENT
Start: 2021-07-29 | End: 2021-11-12

## 2021-07-28 RX ADMIN — SERTRALINE 100 MG: 100 TABLET, FILM COATED ORAL at 08:51

## 2021-07-28 RX ADMIN — ENOXAPARIN SODIUM 40 MG: 40 INJECTION SUBCUTANEOUS at 08:51

## 2021-07-28 RX ADMIN — SODIUM CHLORIDE 500 ML: 9 INJECTION, SOLUTION INTRAVENOUS at 15:50

## 2021-07-28 RX ADMIN — ARIPIPRAZOLE 5 MG: 5 TABLET ORAL at 08:51

## 2021-07-28 RX ADMIN — FENOFIBRATE 160 MG: 160 TABLET ORAL at 08:51

## 2021-07-28 RX ADMIN — LEVOTHYROXINE SODIUM 50 MCG: 50 TABLET ORAL at 08:51

## 2021-07-28 RX ADMIN — ASPIRIN 81 MG: 81 TABLET, CHEWABLE ORAL at 08:51

## 2021-07-28 RX ADMIN — SODIUM CHLORIDE, PRESERVATIVE FREE 10 ML: 5 INJECTION INTRAVENOUS at 08:50

## 2021-07-28 RX ADMIN — OXYCODONE HYDROCHLORIDE AND ACETAMINOPHEN 1 TABLET: 5; 325 TABLET ORAL at 00:37

## 2021-07-28 RX ADMIN — TAMSULOSIN HYDROCHLORIDE 0.4 MG: 0.4 CAPSULE ORAL at 08:50

## 2021-07-28 RX ADMIN — INSULIN LISPRO 6 UNITS: 100 INJECTION, SOLUTION INTRAVENOUS; SUBCUTANEOUS at 08:53

## 2021-07-28 RX ADMIN — PANTOPRAZOLE SODIUM 40 MG: 40 TABLET, DELAYED RELEASE ORAL at 08:51

## 2021-07-28 RX ADMIN — INSULIN LISPRO 6 UNITS: 100 INJECTION, SOLUTION INTRAVENOUS; SUBCUTANEOUS at 12:16

## 2021-07-28 RX ADMIN — METOPROLOL TARTRATE 75 MG: 25 TABLET, FILM COATED ORAL at 08:51

## 2021-07-28 RX ADMIN — INSULIN LISPRO 6 UNITS: 100 INJECTION, SOLUTION INTRAVENOUS; SUBCUTANEOUS at 17:34

## 2021-07-28 RX ADMIN — LISINOPRIL 5 MG: 5 TABLET ORAL at 08:51

## 2021-07-28 RX ADMIN — CYCLOBENZAPRINE 10 MG: 10 TABLET, FILM COATED ORAL at 07:02

## 2021-07-28 RX ADMIN — GABAPENTIN 300 MG: 300 CAPSULE ORAL at 08:51

## 2021-07-28 RX ADMIN — INSULIN GLARGINE 75 UNITS: 100 INJECTION, SOLUTION SUBCUTANEOUS at 08:56

## 2021-07-28 ASSESSMENT — PAIN DESCRIPTION - PAIN TYPE
TYPE: ACUTE PAIN
TYPE: ACUTE PAIN

## 2021-07-28 ASSESSMENT — PAIN SCALES - GENERAL
PAINLEVEL_OUTOF10: 8
PAINLEVEL_OUTOF10: 7

## 2021-07-28 ASSESSMENT — PAIN DESCRIPTION - LOCATION
LOCATION: EAR;NECK
LOCATION: EAR;NECK

## 2021-07-28 ASSESSMENT — PAIN - FUNCTIONAL ASSESSMENT: PAIN_FUNCTIONAL_ASSESSMENT: ACTIVITIES ARE NOT PREVENTED

## 2021-07-28 ASSESSMENT — PAIN DESCRIPTION - PROGRESSION: CLINICAL_PROGRESSION: NOT CHANGED

## 2021-07-28 ASSESSMENT — PAIN DESCRIPTION - DESCRIPTORS: DESCRIPTORS: ACHING

## 2021-07-28 ASSESSMENT — PAIN DESCRIPTION - ORIENTATION
ORIENTATION: LEFT
ORIENTATION: LEFT

## 2021-07-28 ASSESSMENT — PAIN DESCRIPTION - FREQUENCY
FREQUENCY: CONTINUOUS
FREQUENCY: CONTINUOUS

## 2021-07-28 ASSESSMENT — PAIN DESCRIPTION - ONSET: ONSET: ON-GOING

## 2021-07-28 NOTE — PROGRESS NOTES
MI Documentation    MI: : NSTEMI    PCI: NO    EF: NO ECHO completed at this time   ASA w/i first 24 hours: YES   BB w/i first 24 hours: YES       ------------------------------------------  1. ASA: YES  2.  BB: YES  3.   ACE/ARB: YES  4.  Statin: YES  5.  P2Y12: NO    ))))))REMEMBER NTG SL AT DISCHARGE((((((

## 2021-07-28 NOTE — CARE COORDINATION
7/28/21, 7:59 AM EDT  DISCHARGE PLANNING EVALUATION:    Silvestre Pallas       Admitted: 7/27/2021/ [address] day: 1   Location: [facility]- Reason for admit: NSTEMI (non-ST elevated myocardial infarction) ([facility] [address] [address] [I21.4]   PMH:  has a past medical history of Bipolar 2 disorder ([facility] [address] BPH (benign prostatic hyperplasia), Diabetes mellitus type 2, uncontrolled ([address] Diabetic peripheral neuropathy ([address] Diabetic polyneuropathy ([address] Diabetic ulcer of right foot associated with type 2 diabetes mellitus ([facility] [address] Essential hypertension, GERD (gastroesophageal reflux disease), Hammer toe of left foot, Heart murmur, History of tobacco abuse, Hx of AKA (above knee amputation), right ([address] Macular edema, diabetic, bilateral ([facility] [address] Marijuana abuse in remission, Melena, MRSA (methicillin resistant staph aureus) culture positive, Onychomycosis, Other disorders of kidney and ureter in diseases classified elsewhere, Sleep apnea, Thyroid disease, and 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 Procedure: None  Barriers to Discharge:  Admitted through ED with neck pain, leg pain and nausea. Troponins negative. Sodium 133, BUN 25, creatinine 1.0. WBC 14.3. Lovenox. Planning CT C-spine today, if negative planning discharge. PCP: Maddy Miles MD  Readmission Risk Score: 32%    Patient Goals/Plan/Treatment Preferences: Spoke with Quinn Ryan, he lives at home with roommates. He uses the [employer] for transportation. He has a wheelchair and standard walker at home. Denies any HH or the need for [place]. Verified pt's PCP and insurance. Transportation/Food Security/Housekeeping Addressed:  No issues identified. UPDATE: 12:42 PM EDT    Discharge orders received. Planning home today. 7/28/21, 12:42 PM EDT    Patient goals/plan/ treatment preferences discussed by  and . Patient goals/plan/ treatment preferences reviewed with patient/ family.   Patient/ family verbalize understanding of discharge plan and are in agreement with goal/plan/treatment preferences. Understanding was demonstrated using the teach back method. AVS provided by RN at time of discharge, which includes all necessary medical information pertaining to the patients current course of illness, treatment, post-discharge goals of care, and treatment preferences.         IMM Letter  IMM Letter given to Patient/Family/Significant other/Guardian/POA/by[de-identified] Pt Access  IMM Letter date given[de-identified] 07/27/21  IMM Letter time given[de-identified] 0693

## 2021-07-28 NOTE — PROGRESS NOTES
Spoke with cardiac rehab. They stated the patient would not qualify for cardiac rehab because he was not an MI, receive stents or open heart surgery.

## 2021-07-28 NOTE — PROGRESS NOTES
Patient given discharge instructions via teachback method. Patient verbalizes understanding of new medications and side effects. He verbalizes understanding of follow up appointments and compliance with his medications. Waiting for ride for discharge that is supposed to be here at 8 p.m.

## 2021-07-28 NOTE — PROGRESS NOTES
Nutrition Education  Received consult for diabetic diet education. Pt with Degenerative Disc Dease with Cervical Radiculopathy with Hx Type II DM Poorly Controlled, HTN, HLD. Bipolar. Pt just seen by Iwona Weller RD at our OP facility on 7/13/21 for this diet education however pt unable to tell me carbohydrate amounts & appears to not adhere to diet. He eats whenever he wants, avoids eye contact. I asked pt if he felt ill & he reports \"No, ... I'm just not into it\" and declined diet review. I asked him how much carb he should be eating at meals & he reports \" I don't know\". Appears to lack interest. Mentions he consumes 2 vegetarian whoppers for lunch, does like meat. Did not mention any fruit or vegetables in diet hx but mentions he does eat these type of foods. Likes to snack on chips & diet caffeineated pop. Ht: 5'6 Wt: 231# 7.7oz no edema (7/27), Hx  Rt AKA noted. · Written educational materials provided on heart healthy carb controlled diet 45-60 grams CHO at breakfast, 60 grams CHO/lunch , 60 grams CHO at dinner, 15-20grams CHO for bedtime snack  With 1-2 oz lean protein at breakfast, 2-3oz at lunch, 2-3oz dinner, 1oz bedtime snack as appropriate. Encouraged pt to switch to decaf sugar free beverages. Suspect pt's Hx Bipolar affecting his ability to concentrate &  understand this diet. · Contact name and number provided. · Refer to Patient Education activity for more details.     Electronically signed by Chere Bamberger, RD, LD on 7/28/21 at 3:28 PM EDT    Contact: [phone]

## 2021-07-28 NOTE — PROGRESS NOTES
Informed Dr. Aron Rhoades of the recheck BP being 112/55. He stated okay to go home, and the hold his lopressor for tonight and resume tomorrow. Orders placed in Epic.

## 2021-07-28 NOTE — DISCHARGE SUMMARY
Hospital Medicine Discharge Summary      Patient Identification:   Alyse Kraus   : 1970  MRN: [id number]   Account: [de-identified]      Patient's PCP: Daniela aLcey MD    Admit Date: 2021     Discharge Date:   2021    Admitting Physician: Gracy Quinn MD     Discharge Physician: Gracy Quinn MD     Discharge Diagnoses:  1. Degenerative disc disease with cervical radiculopathy, being discharged on Lidoderm patch with referral to pain specialist as outpatient  2. Mild troponin leak but NSTEMI ruled out  3. Type 2 diabetes poorly controlled patient was advised to follow-up with primary care provider for medication adjustment gradually  4. Hypertension  5 hyperlipidemia  6. Gastroesophageal reflux disease  7. mild cellulitis of the lower extremity, will discharge on oral doxycycline for 10 days  8. Bipolar disorder continue home medications  9. Hyponatremia stable      The patient was seen and examined on day of discharge and this discharge summary is in conjunction with any daily progress note from day of discharge. Hospital Course:   Alyse Kraus is a 46 y.o. male admitted to [address] [place] [address] on 2021 with poorly controlled diabetes, high risk for an acute cardiac event presents with c/o left sided pain. Pt states the pain is throughout his ear and neck. Pt states pain is also down his left leg and is sharp and aching. Pt also reports nausea  On routine testing in the emergency department patient was noted to have a mild troponin elevation, however there are no EKG changes patient denies any ongoing chest pain chest x-ray is negative for any mediastinal widening however in view of his being high risk for coronary artery disease will admit to monitor closely .         Exam:     Vitals:  Vitals:    21 2351 21 0514 21 0845 21 1130   BP: 109/60 (!) 105/58 (!) 108/58 (!) 94/52   Pulse: 78 72 70 68   Resp: 18 18 18 16   Temp: 97.3 °F (36.3 °C) 97.3 °F (36.3 °C) 97.8 °F (36.6 °C) 98.1 °F (36.7 °C)   TempSrc: Oral Oral Oral Oral   SpO2: 97% 92% 96% 96%   Weight:       Height:         Weight: Weight: 231 lb 7.7 oz (105 kg)     24 hour intake/output:    Intake/Output Summary (Last 24 hours) at 7/28/2021 1149  Last data filed at 7/28/2021 3513  Gross per 24 hour   Intake 740 ml   Output --   Net 740 ml         General appearance:  No apparent distress, appears stated age and cooperative. HEENT:  Normal cephalic, atraumatic without obvious deformity. Pupils equal, round, and reactive to light. Extra ocular muscles intact. Conjunctivae/corneas clear. Neck: Supple, with full range of motion. No jugular venous distention. Trachea midline. Respiratory:  Normal respiratory effort. Clear to auscultation, bilaterally without Rales/Wheezes/Rhonchi. Cardiovascular:  Regular rate and rhythm with normal S1/S2 without murmurs, rubs or gallops. Abdomen: Soft, non-tender, non-distended with normal bowel sounds. Musculoskeletal:  No clubbing, cyanosis or edema bilaterally. Full range of motion without deformity. Skin: Skin color, texture, turgor normal.  No rashes or lesions. Neurologic:  Neurovascularly intact without any focal sensory/motor deficits. Cranial nerves: II-XII intact, grossly non-focal.  Psychiatric:  Alert and oriented, thought content appropriate, normal insight  Capillary Refill: Brisk,< 3 seconds   Peripheral Pulses: +2 palpable, equal bilaterally       Labs:  For convenience and continuity at follow-up the following most recent labs are provided:      CBC:    Lab Results   Component Value Date    WBC 14.3 07/28/2021    HGB 14.8 07/28/2021    HCT 43.9 07/28/2021     07/28/2021       Renal:    Lab Results   Component Value Date     07/28/2021    K 4.2 07/28/2021    CL 96 07/28/2021    CO2 26 07/28/2021    BUN 25 07/28/2021    CREATININE 1.0 07/28/2021    CALCIUM 9.7 07/28/2021    PHOS 2.6 02/16/2019         Significant Diagnostic Studies    Radiology:   CT CERVICAL SPINE WO CONTRAST   Final Result       1. No acute findings. 2. Degenerative changes the cervical spine. The degree of spinal canal stenosis is somewhat difficult to assess. There does appear to be moderate severity spinal canal stenosis at the C3-4 level. 3. Mild to moderate spinal canal stenosis at the C4-5 level. **This report has been created using voice recognition software. It may contain minor errors which are inherent in voice recognition technology. **      Final report electronically signed by Dr. Alley Frazier on 7/28/2021 10:44 AM      US HEAD NECK SOFT TISSUE THYROID   Final Result      1. No acute disease. This document has been electronically signed by: Jerod Tuttle M.D. on    07/27/2021 11:12 PM      XR CHEST PORTABLE   Final Result   No acute cardiopulmonary disease. **This report has been created using voice recognition software. It may contain minor errors which are inherent in voice recognition technology. **      Final report electronically signed by Dr. Briana Larson on 7/27/2021 1:33 PM             Consults:     None    Disposition: Home  Condition at Discharge: Stable    Code Status:  Full Code     Patient Instructions:    Discharge lab work: BMP as out patient   Activity: activity as tolerated  Diet: ADULT DIET; Regular; 3 carb choices (45 gm/meal); Low Fat/Low Chol/High Fiber/RAMO; Low Sodium (2 gm); No Caffeine      Follow-up visits:   No follow-up provider specified.        Discharge Medications:      Tara Champagne   Home Medication Instructions VNU:[id number]    Printed on:07/28/21 114   Medication Information                      ARIPiprazole (ABILIFY) 5 MG tablet  Take 5 mg by mouth daily              aspirin 81 MG chewable tablet  Take 1 tablet by mouth daily             atorvastatin (LIPITOR) 40 MG tablet  Take 1 tablet by mouth nightly             Calcium-Phosphorus-Vitamin D (CALCIUM/D3 ADULT GUMMIES PO)  Take 2 tablets by mouth daily             Continuous Blood Gluc  (DEXCOM G6 ) LIANNE  1 Device by Does not apply route 4 times daily (before meals and nightly)             Continuous Blood Gluc Sensor (DEXCOM G6 SENSOR) MISC  1 Device by Does not apply route every 14 days             Continuous Blood Gluc Transmit (DEXCOM G6 TRANSMITTER) MISC  1 Device by Does not apply route every 3 months             empagliflozin (JARDIANCE) 10 MG tablet  Take 1 tablet by mouth daily             fenofibrate (TRICOR) 145 MG tablet  Take 1 tablet by mouth daily             gabapentin (NEURONTIN) 300 MG capsule  TAKE 1 CAPSULE BY MOUTH THREE TIMES DAILY. Insulin Glargine, 2 Unit Dial, 300 UNIT/ML SOPN  Change to 75 units BID and every 10 days increase by 5 units both times, to get am sugars 150-200             insulin lispro (HUMALOG KWIKPEN U-200) 200 UNIT/ML SOPN pen  30 units Plus scale - Max dose 150 units per day             Insulin Pen Needle (PEN NEEDLES) 31G X 8 MM MISC  For use with insulin injections five timed per day dx:E11.9             levothyroxine (SYNTHROID) 50 MCG tablet  Take 1 tablet by mouth daily             lidocaine 4 % external patch  Place 1 patch onto the skin daily             lisinopril (PRINIVIL;ZESTRIL) 5 MG tablet  Take 1 tablet by mouth daily             metoprolol tartrate (LOPRESSOR) 50 MG tablet  Take 1.5 tablets by mouth 2 times daily             Multiple Vitamins-Minerals (THERAPEUTIC MULTIVITAMIN-MINERALS) tablet  Take 1 tablet by mouth daily             sertraline (ZOLOFT) 50 MG tablet  Take 50 mg by mouth daily             tamsulosin (FLOMAX) 0.4 MG capsule  Take 1 capsule by mouth daily Take one capsule daily                 Time Spent on discharge is more than 30 minutes in the examination, evaluation, counseling and review of medications and discharge plan.     Discharge Medications for PCI/MI (performed or attempted):   ASA:   yes    Statin:   yes  ACE/ARB:     P2Y12 Inhibitor:  yes   Beta Blocker:   yes  Nitro SL:   yes   Cardiac Rehab:   referred   Dietary Consult:  referred           Signed: Thank you Daniela Lacey MD for the opportunity to be involved in this patient's care.     Electronically signed by Gracy Quinn MD on 7/28/21 at 11:49 AM EDT

## 2021-07-28 NOTE — PROGRESS NOTES
Informed Dr. Pam Terry of the patient's blood pressure still being low. He stated to order a 500mL fluid bolus and then he could go home.

## 2021-07-29 LAB
EKG ATRIAL RATE: 71 BPM
EKG P AXIS: 18 DEGREES
EKG P-R INTERVAL: 200 MS
EKG Q-T INTERVAL: 410 MS
EKG QRS DURATION: 84 MS
EKG QTC CALCULATION (BAZETT): 445 MS
EKG R AXIS: 3 DEGREES
EKG T AXIS: 40 DEGREES
EKG VENTRICULAR RATE: 71 BPM

## 2021-07-29 NOTE — PROGRESS NOTES
Pt discharged in stable condition. Pt transported to private car with family member by staff.   Discharge instructions reviewed with patient by Lydia Omalley RN

## 2021-08-03 DIAGNOSIS — G62.9 NEUROPATHY: ICD-10-CM

## 2021-08-03 DIAGNOSIS — E11.65 UNCONTROLLED TYPE 2 DIABETES MELLITUS WITH HYPERGLYCEMIA, WITH LONG-TERM CURRENT USE OF INSULIN (HCC): ICD-10-CM

## 2021-08-03 DIAGNOSIS — Z79.4 UNCONTROLLED TYPE 2 DIABETES MELLITUS WITH HYPERGLYCEMIA, WITH LONG-TERM CURRENT USE OF INSULIN (HCC): ICD-10-CM

## 2021-08-03 RX ORDER — GABAPENTIN 300 MG/1
CAPSULE ORAL
Qty: 90 CAPSULE | Refills: 2 | Status: SHIPPED | OUTPATIENT
Start: 2021-08-03 | End: 2021-11-02 | Stop reason: SDUPTHER

## 2021-08-06 ENCOUNTER — OFFICE VISIT (OUTPATIENT)
Dept: INTERNAL MEDICINE CLINIC | Age: 51
End: 2021-08-06
Payer: MEDICARE

## 2021-08-06 VITALS — SYSTOLIC BLOOD PRESSURE: 138 MMHG | DIASTOLIC BLOOD PRESSURE: 80 MMHG | TEMPERATURE: 97.5 F | HEART RATE: 98 BPM

## 2021-08-06 DIAGNOSIS — R07.89 ATYPICAL CHEST PAIN: ICD-10-CM

## 2021-08-06 DIAGNOSIS — E11.65 UNCONTROLLED TYPE 2 DIABETES MELLITUS WITH HYPERGLYCEMIA (HCC): Primary | ICD-10-CM

## 2021-08-06 DIAGNOSIS — Z91.199 NONCOMPLIANCE WITH DIABETES TREATMENT: ICD-10-CM

## 2021-08-06 LAB
CHP ED QC CHECK: ABNORMAL
GLUCOSE BLD-MCNC: 306 MG/DL

## 2021-08-06 PROCEDURE — 3017F COLORECTAL CA SCREEN DOC REV: CPT | Performed by: INTERNAL MEDICINE

## 2021-08-06 PROCEDURE — 82962 GLUCOSE BLOOD TEST: CPT | Performed by: INTERNAL MEDICINE

## 2021-08-06 PROCEDURE — G8417 CALC BMI ABV UP PARAM F/U: HCPCS | Performed by: INTERNAL MEDICINE

## 2021-08-06 PROCEDURE — G8427 DOCREV CUR MEDS BY ELIG CLIN: HCPCS | Performed by: INTERNAL MEDICINE

## 2021-08-06 PROCEDURE — 3046F HEMOGLOBIN A1C LEVEL >9.0%: CPT | Performed by: INTERNAL MEDICINE

## 2021-08-06 PROCEDURE — 1111F DSCHRG MED/CURRENT MED MERGE: CPT | Performed by: INTERNAL MEDICINE

## 2021-08-06 PROCEDURE — 1036F TOBACCO NON-USER: CPT | Performed by: INTERNAL MEDICINE

## 2021-08-06 PROCEDURE — 2022F DILAT RTA XM EVC RTNOPTHY: CPT | Performed by: INTERNAL MEDICINE

## 2021-08-06 PROCEDURE — 99214 OFFICE O/P EST MOD 30 MIN: CPT | Performed by: INTERNAL MEDICINE

## 2021-08-06 RX ORDER — EMPAGLIFLOZIN 25 MG/1
1 TABLET, FILM COATED ORAL DAILY
Qty: 30 TABLET | Refills: 5 | Status: SHIPPED | OUTPATIENT
Start: 2021-08-06 | End: 2022-01-18 | Stop reason: SDUPTHER

## 2021-08-06 RX ORDER — DOXYCYCLINE HYCLATE 100 MG/1
CAPSULE ORAL
COMMUNITY
Start: 2021-05-17 | End: 2021-08-06

## 2021-08-06 NOTE — PROGRESS NOTES
[address] [facility] Internal [facility]  [facility] [address], [address]  Dept: [phone]  Dept Fax: [phone]    YOB: 1970    This patient presents for post hospitalization follow up. Recent medical records were reviewed from hospital .   Unfortunately due to hyperglycemia, dr. Nette Sandoval plans on doing the left leg surgery but due to sugars which is   Really not well controlled, with his dietary non compliance, and claims he lost his meter. He called dexcom,   His last A1c is 9.6, 10.2, and 10.3  . Fortunately while in the hospital his sugars were in low 200's , again   Clari Parson was educated on compliance. Last intake of food this morning about 2 hrs ago. He had atypical CP,   Mild trop leak, NSTEMI was ruled out, and he was discharged on 7/28/21. NO LOC or any abd pain, no   Fever or chills, he gets around in a wheel chair. Ortho surgery was cancelled due to hyperglycemia, and   Currently date is open. And today's blood sugar is 306 , 2 hrs PP. Ex smoker quit 5 yrs ago. Patient Active Problem List   Diagnosis    Status post below knee amputation of right lower extremity (HCC)    Gait disturbance    Sebaceous cyst    Uncontrolled type 2 diabetes mellitus with hyperglycemia, with long-term current use of insulin (HCC)    Severe bipolar II disorder, recent episode major depressive, remission ([facility] [address]    Bipolar 1 disorder (HCC)    Leukocytosis    Lactic acidosis    Hyponatremia    Normocytic anemia    Obesity (BMI 30-39. 9)    History of noncompliance with medical treatment    Essential hypertension    Tobacco dependence    Gastroesophageal reflux disease without esophagitis    History of marijuana use    Physical debility    Anemia    Electrolyte imbalance    Type 2 diabetes mellitus with hyperglycemia, with long-term current use of insulin (HCC)    Weakness    Infection of above knee amputation stump of right leg (HCC)    Above knee amputation of right lower doses. If no relief after 1 dose, call 911. 30 tablet 3    metoprolol tartrate (LOPRESSOR) 50 MG tablet Take 1 tablet by mouth 2 times daily 90 tablet 3    Insulin Glargine, 2 Unit Dial, 300 UNIT/ML SOPN Change to 75 units BID and every 10 days increase by 5 units both times, to get am sugars 150-200 15 pen 2    insulin lispro (HUMALOG KWIKPEN U-200) 200 UNIT/ML SOPN pen 30 units Plus scale - Max dose 150 units per day 15 pen 1    Insulin Pen Needle (PEN NEEDLES) 31G X 8 MM MISC For use with insulin injections five timed per day dx:E11.9 150 each 5    Continuous Blood Gluc Transmit (DEXCOM G6 TRANSMITTER) MISC 1 Device by Does not apply route every 3 months 1 each 3    Continuous Blood Gluc Sensor (DEXCOM G6 SENSOR) MISC 1 Device by Does not apply route every 14 days 9 each 3    Continuous Blood Gluc  (DEXCOM G6 ) LIANNE 1 Device by Does not apply route 4 times daily (before meals and nightly) 1 Device 0    lisinopril (PRINIVIL;ZESTRIL) 5 MG tablet Take 1 tablet by mouth daily 90 tablet 1    tamsulosin (FLOMAX) 0.4 MG capsule Take 1 capsule by mouth daily Take one capsule daily 30 capsule 11    ARIPiprazole (ABILIFY) 5 MG tablet Take 5 mg by mouth daily        No current facility-administered medications for this visit. Allergies   Allergen Reactions    Pcn [Penicillins] Shortness Of Breath, Nausea And Vomiting and Other (See Comments)     Does not remember reactions. Has TOLERATED amoxicillin and several different cephalosporins.  Actos [Pioglitazone] Swelling    Clindamycin/Lincomycin Itching    Vancomycin Hives      Rash, with erythematous plaques to abdomen, shoulders, and proximal upper extremities with pruritis, persisted with 2nd dose administered slower.       Metformin And Related Swelling       Review of Systems     As outlined above    /80 (Site: Left Upper Arm)   Pulse 98   Temp 97.5 °F (36.4 °C)       Physical Exam:    General appearance - alert, well appearing, and in no distress and normal appearing weight  Mental status - alert and oriented    Neck - supple, no significant adenopathy  Chest - clear to auscultation, no wheezes, rales or rhonchi, symmetric air entry  Heart - normal rate, regular rhythm, normal S1, S2, no murmurs, rubs, clicks or gallops  Abdomen - soft, nontender, nondistended, no masses or organomegaly  no abdominal bruits. Non-obese Protuberant: No  Neurological - alert, oriented, normal speech, no focal findings or movement disorder noted, motor and sensory grossly normal bilaterally  Musculoskeletal - no joint tenderness, deformity or swelling  Extremities - peripheral pulses normal, no pedal edema, no clubbing or cyanosis, Eugene's sign negative bilaterally  Prosthesis: No  Skin - normal coloration and turgor, no rashes, no suspicious skin lesions noted        I have reviewed recent diagnostic testing including labs, EKG. Please see EKG for interpretation. Last nuclear stress and echo from 2020 was reviewed and d/w pt. Lab Results   Component Value Date     07/28/2021    K 4.2 07/28/2021    CL 96 07/28/2021    CO2 26 07/28/2021    BUN 25 07/28/2021    CREATININE 1.0 07/28/2021    GLUCOSE 306 08/06/2021    GLUCOSE 114 12/26/2020    CALCIUM 9.7 07/28/2021        Diagnosis Orders   1. Uncontrolled type 2 diabetes mellitus with hyperglycemia (HCC)  SD DISCHARGE MEDS RECONCILED W/ CURRENT OUTPATIENT MED LIST    POCT Glucose   2. Noncompliance with diabetes treatment  SD DISCHARGE MEDS RECONCILED W/ CURRENT OUTPATIENT MED LIST   3. Atypical chest pain              Diagnosis Orders   1. Uncontrolled type 2 diabetes mellitus with hyperglycemia (HCC)  SD DISCHARGE MEDS RECONCILED W/ CURRENT OUTPATIENT MED LIST    POCT Glucose   2. Noncompliance with diabetes treatment  SD DISCHARGE MEDS RECONCILED W/ CURRENT OUTPATIENT MED LIST   3.  Atypical chest pain           Plan:    Orders Placed This Encounter   Procedures    POCT Glucose    SD DISCHARGE MEDS RECONCILED W/ CURRENT OUTPATIENT MED LIST       Outpatient Encounter Medications as of 8/6/2021   Medication Sig Dispense Refill    empagliflozin (JARDIANCE) 25 MG tablet Take 1 tablet by mouth daily New dose 30 tablet 5    [DISCONTINUED] doxycycline hyclate (VIBRAMYCIN) 100 MG capsule       gabapentin (NEURONTIN) 300 MG capsule TAKE 1 CAPSULE BY MOUTH THREE TIMES DAILY. 90 capsule 2    Calcium-Phosphorus-Vitamin D (CALCIUM/D3 ADULT GUMMIES PO) Take 2 tablets by mouth daily      sertraline (ZOLOFT) 50 MG tablet Take 50 mg by mouth daily      Multiple Vitamins-Minerals (THERAPEUTIC MULTIVITAMIN-MINERALS) tablet Take 1 tablet by mouth daily      aspirin 81 MG chewable tablet Take 1 tablet by mouth daily 30 tablet 3    atorvastatin (LIPITOR) 40 MG tablet Take 1 tablet by mouth nightly 30 tablet 3    lidocaine 4 % external patch Place 1 patch onto the skin daily 30 patch 0    fenofibrate (TRICOR) 145 MG tablet Take 1 tablet by mouth daily 30 tablet 3    levothyroxine (SYNTHROID) 50 MCG tablet Take 1 tablet by mouth daily 90 tablet 1    doxycycline hyclate (VIBRA-TABS) 100 MG tablet Take 1 tablet by mouth 2 times daily for 10 days 20 tablet 0    nitroGLYCERIN (NITROSTAT) 0.3 MG SL tablet Place 1 tablet under the tongue every 5 minutes as needed for Chest pain up to max of 3 total doses.  If no relief after 1 dose, call 911. 30 tablet 3    metoprolol tartrate (LOPRESSOR) 50 MG tablet Take 1 tablet by mouth 2 times daily 90 tablet 3    Insulin Glargine, 2 Unit Dial, 300 UNIT/ML SOPN Change to 75 units BID and every 10 days increase by 5 units both times, to get am sugars 150-200 15 pen 2    insulin lispro (HUMALOG KWIKPEN U-200) 200 UNIT/ML SOPN pen 30 units Plus scale - Max dose 150 units per day 15 pen 1    Insulin Pen Needle (PEN NEEDLES) 31G X 8 MM MISC For use with insulin injections five timed per day dx:E11.9 150 each 5    Continuous Blood Gluc Transmit (DEXCOM G6 TRANSMITTER) MISC 1 Device by Does not apply route every 3 months 1 each 3    Continuous Blood Gluc Sensor (DEXCOM G6 SENSOR) MISC 1 Device by Does not apply route every 14 days 9 each 3    Continuous Blood Gluc  (DEXCOM G6 ) LIANNE 1 Device by Does not apply route 4 times daily (before meals and nightly) 1 Device 0    lisinopril (PRINIVIL;ZESTRIL) 5 MG tablet Take 1 tablet by mouth daily 90 tablet 1    tamsulosin (FLOMAX) 0.4 MG capsule Take 1 capsule by mouth daily Take one capsule daily 30 capsule 11    [DISCONTINUED] empagliflozin (JARDIANCE) 10 MG tablet Take 1 tablet by mouth daily 90 tablet 1    ARIPiprazole (ABILIFY) 5 MG tablet Take 5 mg by mouth daily        No facility-administered encounter medications on file as of 8/6/2021. Controlled Substances Monitoring: Will schedule follow up appointment in 4 weeks. Plan:    Increase the long acting and meal time insulin by units, and he was highly encouraged once again to be careful with his diet, and follow up with the diabetic clinic, claims he could not get a ride for the last visit. He was encouraged to call us in 10 days with the sugars. Increase the jardiance to 25 mg daily. Significant time was spent today with reviewing his hospital MR, and counseling, for optimal sugars. He does not need any further cardiac work up at this time, last nuclear and echo were noted along with EKG, and recent hosp MR were noted and d/w him today     RTO  4 weeks. Signed by : Drew Prater M.D.       [address] [facility] Internal Medicine  [address] [facility], [facility]  [address] [facility], [facility]    Tel  [phone]  Fax [phone]

## 2021-08-09 ENCOUNTER — TELEPHONE (OUTPATIENT)
Dept: INTERNAL MEDICINE CLINIC | Age: 51
End: 2021-08-09

## 2021-08-09 NOTE — TELEPHONE ENCOUNTER
Frances Desouza from Dr. Eddie Agarwal office called and left a message to address the call Drew Clark made on 8/6/2021. Andres Joiner stated that Jamie's appointment with Dr. Leigh Caicedo was not to be a Pre-Op for surgery. Nathan Irvin is not even scheduled for surgery until his A1c is below 7%. She asked that we let them know if he did have an A1c below 7% so that could schedule the surgery. His A1c is still 9.6% on 6/29/2021.

## 2021-08-11 NOTE — TELEPHONE ENCOUNTER
This pt needs better sugars pre surgery, so please check on him .        Electronically signed by Matt Luna MD on 8/11/2021 at 1:17 PM

## 2021-08-12 ENCOUNTER — TELEPHONE (OUTPATIENT)
Dept: INTERNAL MEDICINE CLINIC | Age: 51
End: 2021-08-12

## 2021-08-12 NOTE — TELEPHONE ENCOUNTER
Per Dr. Taryn Mason, called Shayy Mills to assess diabetes control. Shayy Mills is unable to discuss in depth at this time but is willing to schedule an appt with DM clinic pharmacist early next week.     Nedra Vasques) Adrian Wang, PharmD, SAME DAY SURGERY [facility]  Internal Medicine Clinical Pharmacist  [phone]

## 2021-08-16 ENCOUNTER — OFFICE VISIT (OUTPATIENT)
Dept: INTERNAL MEDICINE CLINIC | Age: 51
End: 2021-08-16
Payer: MEDICARE

## 2021-08-16 VITALS
DIASTOLIC BLOOD PRESSURE: 88 MMHG | HEART RATE: 110 BPM | TEMPERATURE: 97.4 F | BODY MASS INDEX: 36.32 KG/M2 | SYSTOLIC BLOOD PRESSURE: 140 MMHG | WEIGHT: 225 LBS

## 2021-08-16 VITALS
SYSTOLIC BLOOD PRESSURE: 140 MMHG | BODY MASS INDEX: 36.32 KG/M2 | WEIGHT: 225 LBS | TEMPERATURE: 97.4 F | DIASTOLIC BLOOD PRESSURE: 88 MMHG | HEART RATE: 110 BPM

## 2021-08-16 DIAGNOSIS — E11.65 TYPE 2 DIABETES MELLITUS WITH HYPERGLYCEMIA, WITH LONG-TERM CURRENT USE OF INSULIN (HCC): Primary | ICD-10-CM

## 2021-08-16 DIAGNOSIS — E11.65 TYPE 2 DIABETES MELLITUS WITH HYPERGLYCEMIA, WITH LONG-TERM CURRENT USE OF INSULIN (HCC): ICD-10-CM

## 2021-08-16 DIAGNOSIS — E66.9 OBESITY (BMI 30-39.9): ICD-10-CM

## 2021-08-16 DIAGNOSIS — Z79.4 TYPE 2 DIABETES MELLITUS WITH HYPERGLYCEMIA, WITH LONG-TERM CURRENT USE OF INSULIN (HCC): ICD-10-CM

## 2021-08-16 DIAGNOSIS — I10 ESSENTIAL HYPERTENSION: ICD-10-CM

## 2021-08-16 DIAGNOSIS — Z79.4 TYPE 2 DIABETES MELLITUS WITH HYPERGLYCEMIA, WITH LONG-TERM CURRENT USE OF INSULIN (HCC): Primary | ICD-10-CM

## 2021-08-16 PROCEDURE — 99214 OFFICE O/P EST MOD 30 MIN: CPT | Performed by: INTERNAL MEDICINE

## 2021-08-16 PROCEDURE — 3046F HEMOGLOBIN A1C LEVEL >9.0%: CPT | Performed by: INTERNAL MEDICINE

## 2021-08-16 PROCEDURE — 1036F TOBACCO NON-USER: CPT | Performed by: INTERNAL MEDICINE

## 2021-08-16 PROCEDURE — 2022F DILAT RTA XM EVC RTNOPTHY: CPT | Performed by: INTERNAL MEDICINE

## 2021-08-16 PROCEDURE — G0108 DIAB MANAGE TRN  PER INDIV: HCPCS | Performed by: INTERNAL MEDICINE

## 2021-08-16 PROCEDURE — 1111F DSCHRG MED/CURRENT MED MERGE: CPT | Performed by: INTERNAL MEDICINE

## 2021-08-16 PROCEDURE — 3017F COLORECTAL CA SCREEN DOC REV: CPT | Performed by: INTERNAL MEDICINE

## 2021-08-16 PROCEDURE — G8417 CALC BMI ABV UP PARAM F/U: HCPCS | Performed by: INTERNAL MEDICINE

## 2021-08-16 PROCEDURE — G8427 DOCREV CUR MEDS BY ELIG CLIN: HCPCS | Performed by: INTERNAL MEDICINE

## 2021-08-16 RX ORDER — NITROGLYCERIN 0.3 MG/1
0.3 TABLET SUBLINGUAL EVERY 5 MIN PRN
Qty: 30 TABLET | Refills: 3 | Status: SHIPPED | OUTPATIENT
Start: 2021-08-16 | End: 2022-01-18 | Stop reason: SDUPTHER

## 2021-08-16 NOTE — PROGRESS NOTES
[address] [facility] Internal Medicine  [facility] [address]  [address] [facility], [address] [facility]  Dept: [phone]  Dept Fax: [phone]    YOB: 1970    He is an obese WM here for follow up of his poorly controlled DM-2 . Unfortunately due to hyperglycemia, dr. Anthony Nunez has postponed the LLE surgery . Really not well controlled, with his dietary non compliance, and claims he lost his meter. Fortunately got his dexcom back in action. His last A1c is 9.6, 10.2, and 10.3  Due for another A1c in September. James Bhatia was educated on compliance. He gets around in a wheel chair, and already with amputation of RLE , AKA done July 2016 , done at [address] [place] [address]. Claims he does not drink alcohol any longer. We downloaded his dexcom, only 11 % within target range, over 50% in the high range, and this was d/w pt in detail. , and also spoke with our Angus Castilloe in the office. Patient Active Problem List   Diagnosis    Status post below knee amputation of right lower extremity (HCC)    Gait disturbance    Sebaceous cyst    Uncontrolled type 2 diabetes mellitus with hyperglycemia, with long-term current use of insulin (HCC)    Severe bipolar II disorder, recent episode major depressive, remission ([facility] [address]    Bipolar 1 disorder (HCC)    Leukocytosis    Lactic acidosis    Hyponatremia    Normocytic anemia    Obesity (BMI 30-39. 9)    History of noncompliance with medical treatment    Essential hypertension    Tobacco dependence    Gastroesophageal reflux disease without esophagitis    History of marijuana use    Physical debility    Anemia    Electrolyte imbalance    Type 2 diabetes mellitus with hyperglycemia, with long-term current use of insulin (HCC)    Weakness    Infection of above knee amputation stump of right leg (HCC)    Above knee amputation of right lower extremity (HCC)    Sepsis (HCC)    Vitamin D deficiency    COPD exacerbation (HCC)    Influenza B    Depression, recurrent ([facility] [address]    Major depressive disorder, recurrent ([facility] [address]    GI bleed    Elevated lipase    Other psychoactive substance abuse, uncomplicated (HCC)    Calculus of gallbladder without cholecystitis without obstruction    Right upper quadrant abdominal pain    Pedal edema    MURALI (obstructive sleep apnea)    Shortness of breath    Hypothyroid    Anxiety and depression    Dyspnea    Sinus tachycardia    Metabolic acidosis    Edema of left lower extremity    SOB (shortness of breath) on exertion    Intermittent palpitations    History of chest pain    Dizziness, nonspecific    Hyperlipidemia LDL goal <70    Neuropathy       Current Outpatient Medications   Medication Sig Dispense Refill    nitroGLYCERIN (NITROSTAT) 0.3 MG SL tablet Place 1 tablet under the tongue every 5 minutes as needed for Chest pain up to max of 3 total doses. If no relief after 1 dose, call 911. 30 tablet 3    empagliflozin (JARDIANCE) 25 MG tablet Take 1 tablet by mouth daily New dose 30 tablet 5    gabapentin (NEURONTIN) 300 MG capsule TAKE 1 CAPSULE BY MOUTH THREE TIMES DAILY.  90 capsule 2    Calcium-Phosphorus-Vitamin D (CALCIUM/D3 ADULT GUMMIES PO) Take 2 tablets by mouth daily      sertraline (ZOLOFT) 50 MG tablet Take 50 mg by mouth daily      Multiple Vitamins-Minerals (THERAPEUTIC MULTIVITAMIN-MINERALS) tablet Take 1 tablet by mouth daily      aspirin 81 MG chewable tablet Take 1 tablet by mouth daily 30 tablet 3    atorvastatin (LIPITOR) 40 MG tablet Take 1 tablet by mouth nightly 30 tablet 3    fenofibrate (TRICOR) 145 MG tablet Take 1 tablet by mouth daily 30 tablet 3    levothyroxine (SYNTHROID) 50 MCG tablet Take 1 tablet by mouth daily 90 tablet 1    metoprolol tartrate (LOPRESSOR) 50 MG tablet Take 1 tablet by mouth 2 times daily 90 tablet 3    Insulin Glargine, 2 Unit Dial, 300 UNIT/ML SOPN Change to 75 units BID and every 10 days increase by 5 units both times, to get am sugars 150-200 15 pen 2  insulin lispro (HUMALOG KWIKPEN U-200) 200 UNIT/ML SOPN pen 30 units Plus scale - Max dose 150 units per day 15 pen 1    Insulin Pen Needle (PEN NEEDLES) 31G X 8 MM MISC For use with insulin injections five timed per day dx:E11.9 150 each 5    Continuous Blood Gluc Transmit (DEXCOM G6 TRANSMITTER) MISC 1 Device by Does not apply route every 3 months 1 each 3    Continuous Blood Gluc Sensor (DEXCOM G6 SENSOR) MISC 1 Device by Does not apply route every 14 days 9 each 3    Continuous Blood Gluc  (DEXCOM G6 ) LIANNE 1 Device by Does not apply route 4 times daily (before meals and nightly) 1 Device 0    lisinopril (PRINIVIL;ZESTRIL) 5 MG tablet Take 1 tablet by mouth daily 90 tablet 1    ARIPiprazole (ABILIFY) 5 MG tablet Take 5 mg by mouth daily        No current facility-administered medications for this visit. Allergies   Allergen Reactions    Pcn [Penicillins] Shortness Of Breath, Nausea And Vomiting and Other (See Comments)     Does not remember reactions. Has TOLERATED amoxicillin and several different cephalosporins.  Actos [Pioglitazone] Swelling    Clindamycin/Lincomycin Itching    Vancomycin Hives      Rash, with erythematous plaques to abdomen, shoulders, and proximal upper extremities with pruritis, persisted with 2nd dose administered slower.  Metformin And Related Swelling       Review of Systems     As outlined above    BP (!) 140/88   Pulse 110   Temp 97.4 °F (36.3 °C)   Wt 225 lb (102.1 kg)   BMI 36.32 kg/m²       Physical Exam:    General appearance - alert, well appearing, and in no distress and normal appearing weight  Mental status - alert and oriented    Neck - supple, no significant adenopathy  Chest - clear to auscultation, no wheezes, rales or rhonchi, symmetric air entry  Heart - normal rate, regular rhythm, normal S1, S2, no murmurs, rubs, clicks or gallops  Abdomen - soft, nontender, nondistended, no masses or organomegaly  no abdominal bruits. Non-obese Protuberant: No  Neurological - alert, oriented, normal speech, no focal findings or movement disorder noted, motor and sensory grossly normal bilaterally  Musculoskeletal - no joint tenderness,   Extremities - peripheral pulses normal, no pedal edema, no clubbing or cyanosis, RLE , AKA   Prosthesis: No  Skin - normal coloration and turgor, no rashes, no suspicious skin lesions noted        I have reviewed recent diagnostic testing including labs done at the hospital      Lab Results   Component Value Date     07/28/2021    K 4.2 07/28/2021    CL 96 07/28/2021    CO2 26 07/28/2021    BUN 25 07/28/2021    CREATININE 1.0 07/28/2021    GLUCOSE 306 08/06/2021    GLUCOSE 114 12/26/2020    CALCIUM 9.7 07/28/2021        Diagnosis Orders   1. Type 2 diabetes mellitus with hyperglycemia, with long-term current use of insulin ([address]     2. Essential hypertension     3. Obesity (BMI 30-39. 9)              Diagnosis Orders   1. Type 2 diabetes mellitus with hyperglycemia, with long-term current use of insulin ([address]     2. Essential hypertension     3. Obesity (BMI 30-39. 9)           Plan:    No orders of the defined types were placed in this encounter. Outpatient Encounter Medications as of 8/16/2021   Medication Sig Dispense Refill    nitroGLYCERIN (NITROSTAT) 0.3 MG SL tablet Place 1 tablet under the tongue every 5 minutes as needed for Chest pain up to max of 3 total doses. If no relief after 1 dose, call 911. 30 tablet 3    empagliflozin (JARDIANCE) 25 MG tablet Take 1 tablet by mouth daily New dose 30 tablet 5    gabapentin (NEURONTIN) 300 MG capsule TAKE 1 CAPSULE BY MOUTH THREE TIMES DAILY.  90 capsule 2    Calcium-Phosphorus-Vitamin D (CALCIUM/D3 ADULT GUMMIES PO) Take 2 tablets by mouth daily      sertraline (ZOLOFT) 50 MG tablet Take 50 mg by mouth daily      Multiple Vitamins-Minerals (THERAPEUTIC MULTIVITAMIN-MINERALS) tablet Take 1 tablet by mouth daily      aspirin 81 MG chewable tablet with long-term current use of insulin ([address]     2. Essential hypertension     3. Obesity (BMI 30-39. 9)         Controlled Substances Monitoring:      Plan:    Increase the long acting and meal time insulin by units, and he was highly encouraged once again to be careful with his diet, and follow up with the diabetic clinic, claims he could not get a ride for the last visit. He was encouraged to call us in 10 days with the sugars. Increase the jardiance to 25 mg daily. Significant time was spent today with reviewing his hospital MR, and counseling, for optimal sugars. He is not a candidate for ozempic, had pancreatitis with trulicity and other agents. He could not tolerate Acos and metformin, so increase the glargine insulin 80 units BID, recommend he increase that Q 10 days by 5 units, to achieve sugars below 130. Pt is requesting an electric wheel chair. I spoke with him re dr. Perez Course office recommendations, no surgery till the A1c is around 7 . May benefit from insulin pump. Hypertension wise patient claims he is compliant on metoprolol 50 mg twice a day, Zestril 5 mg daily. Recommend he also checks amatory blood pressure readings  And  work on weight loss. He does not need any further cardiac work up at this time, last nuclear and echo were noted along with EKG, and recent hosp MR were noted and d/w him today . RTO  2 months. Signed by : Jahaira Helm M.D.       [address] [facility] Internal Medicine  [address] [facility], [facility] [address], [address]    Tel  [phone]  Fax [phone]

## 2021-08-16 NOTE — PATIENT INSTRUCTIONS
1. Increase Toujeo to 80 units twice a day   -Increase by 5 units twice a day if your fasting AM sugars stay above 130 for 10 days in a row  2. Try to be sure to take Humalog before every meal  3. Set up eye exam appt with Dr. Jean Claude Choe  4.  Will check about [employer] insulin pump approval

## 2021-08-16 NOTE — PROGRESS NOTES
[facility]  [address] [facility], [address]  [phone] (phone)  [phone] (fax)    Patient ID: Lauren Kilpatrick 1970  Referring Provider: Dr. Sofy Bundy     Patient's name and  were verified. Subjective:    He presents for His follow-up diabetic visit. He has type 2 diabetes mellitus. Home regimen includes: Insulin and SGLT-2 inhibitors He is noncompliant some of the time. Assessment:     Lab Results   Component Value Date    LABA1C 9.6 2021    LABA1C 10.2 2021    BUN 25 2021    CREATININE 1.0 2021     There were no vitals filed for this visit. Wt Readings from Last 3 Encounters:   21 231 lb 7.7 oz (105 kg)   21 216 lb (98 kg)   21 199 lb (90.3 kg)     Ht Readings from Last 3 Encounters:   21 5' 6\" (1.676 m)   21 5' 6\" (1.676 m)   21 5' 6\" (1.676 m)       Diabetes pharmacotherapy:  Toujeo - 75 unit BID    -Adherence: Takes consistently  Humalog - 25-40 units (30 units -adds or subtracts based amount of carbohydrates consumed + Group 2 SSI)   -Adherence: Doesn't take short acting at evening if in the 190s  Jardiance 25mg daily    Current monitoring regimen: Dexcom  Home blood sugar trends: 11% in range; 52% high; 37% very high   -90s  Any episodes of hypoglycemia? no  Previous visit with dietician: yes - 7/3/21  Current diet: B: flavored oatmeal packet or banana or egg white            L: varies - impossible Whopper (once a week), spaghetti                       D: varies - 2 packets oatmeal or cottage cheese + fruit cocktail                       Snacks: baked potato chips (puts in bowl), Zero sugar pepsi                       Beverages: water, diet Monster, diet Reign  Current exercise: ADLs; activity limited due to leg amputation; uses resistance bands every day  Eye exam current (within one year): unknown - Dr. Ananya Mews  Any history of foot problems?  yes  Last foot exam: 2021 - Dr. Bharat Shipman  Immunizations up to date: Yes  Taking ASA:  Yes  Appropriate for use of MyChart Glucose Grid:  Yes    Focus:   Diabetes Education. Last A1C: 9.6 (6/29/21). Yeyo Padilla is awaiting foot surgery and A1C goal of <7% prior to procedure. Yeyo Padilla expresses frustration at today's appt that his sugars remain elevated & surgery has been delayed. Per Yeyo Padilla, he has been working to reduce dietary carbohydrates and has completely cut out sugary drinks. He does have an appointment with the dietician in ~1 month; however, will try to reschedule for sooner. Yeyo Padilla reports that he is compliant with insulin injections and he rarely skips any (except his Humalog a couple times/week due to fear of lows). Reviewed treatment of lows and discussed importance of taking Humalog consistently before all meals. Jamie's wife is on Ozempic and he would like to try this medication. Discussed that he had elevated lipase & concern for pancreatitis with Trulicity & Tradjenta. It is likely that Ozempic would cause similar issues. Yeyo Padilla also does not tolerate metformin or pioglitazone. Discussed with Yeyo Padilla that insulin titration is the best option at this time. Explained the importance of self-titrating his basal insulin per the parameters of Dr. Aziza Baeza. This will allow him to reach his goal more quickly. Yeyo Padilla has researched insulin pumps and would be interested in the Omnipod. Yeyo Padilla would require additional basal insulin or use of U-200 insulin due to his high insulin requirements. Will pursue approval.    DSME PLAN:   Discussed general issues about diabetes pathophysiology and management. Counseling at today's visit: discussed the advantages of a diet low in carbohydrates. Reminded to get yearly retinal exam.    1. Increase Toujeo to 80 units twice a day (Per Dr. Aziza Baeza)   -Increase by 5 units twice a day if your fasting AM sugars stay above 130 for 10 days in a row  2. Try to be sure to take Humalog before every meal  3. Set up eye exam appt with Dr. Desirae Ross  4.  Will check about Omnipod insulin pump approval  Meter download, medications, PMH and nursing assessment reviewed. Alyse Kraus states He is willing to participate in this plan of care and verbalized understanding of all instructions provided. Teach back used to verify comprehension. Recommended follow-up with dietician & [facility] RN this month/early next month as schedule allows. Total time involved in direct patient education: 30 minutes.      For Pharmacy Admin Tracking Only   Intervention Detail: Dose Adjustment: 1, reason: Therapy Optimization   Total # of Interventions Recommended: 1   Total # of Interventions Accepted: 1   Time Spent (min): 126 [facility] (Sulma Ngo) Nelida Clay, PharmD, [address] [facility]  Internal Medicine Clinical Pharmacist  [phone]

## 2021-08-24 ENCOUNTER — APPOINTMENT (OUTPATIENT)
Dept: GENERAL RADIOLOGY | Age: 51
End: 2021-08-24
Payer: MEDICARE

## 2021-08-24 ENCOUNTER — TELEPHONE (OUTPATIENT)
Dept: INTERNAL MEDICINE CLINIC | Age: 51
End: 2021-08-24

## 2021-08-24 ENCOUNTER — HOSPITAL ENCOUNTER (EMERGENCY)
Age: 51
Discharge: HOME | End: 2021-08-24
Attending: EMERGENCY MEDICINE
Payer: MEDICARE

## 2021-08-24 VITALS
HEIGHT: 66 IN | HEART RATE: 97 BPM | DIASTOLIC BLOOD PRESSURE: 59 MMHG | RESPIRATION RATE: 20 BRPM | BODY MASS INDEX: 35.36 KG/M2 | WEIGHT: 220 LBS | OXYGEN SATURATION: 95 % | SYSTOLIC BLOOD PRESSURE: 127 MMHG | TEMPERATURE: 97.8 F

## 2021-08-24 DIAGNOSIS — J06.9 ACUTE UPPER RESPIRATORY INFECTION: Primary | ICD-10-CM

## 2021-08-24 LAB
ANION GAP SERPL CALCULATED.3IONS-SCNC: 12 MEQ/L (ref 8–16)
BASOPHILS # BLD: 0.6 %
BASOPHILS ABSOLUTE: 0.1 THOU/MM3 (ref 0–0.1)
BUN BLDV-MCNC: 16 MG/DL (ref 7–22)
CALCIUM SERPL-MCNC: 9.6 MG/DL (ref 8.5–10.5)
CHLORIDE BLD-SCNC: 102 MEQ/L (ref 98–111)
CO2: 23 MEQ/L (ref 23–33)
CREAT SERPL-MCNC: 0.7 MG/DL (ref 0.4–1.2)
EKG ATRIAL RATE: 98 BPM
EKG P AXIS: -4 DEGREES
EKG P-R INTERVAL: 168 MS
EKG Q-T INTERVAL: 350 MS
EKG QRS DURATION: 78 MS
EKG QTC CALCULATION (BAZETT): 446 MS
EKG R AXIS: 8 DEGREES
EKG T AXIS: 39 DEGREES
EKG VENTRICULAR RATE: 98 BPM
EOSINOPHIL # BLD: 5.3 %
EOSINOPHILS ABSOLUTE: 0.6 THOU/MM3 (ref 0–0.4)
ERYTHROCYTE [DISTWIDTH] IN BLOOD BY AUTOMATED COUNT: 15 % (ref 11.5–14.5)
ERYTHROCYTE [DISTWIDTH] IN BLOOD BY AUTOMATED COUNT: 49 FL (ref 35–45)
FLU A ANTIGEN: NEGATIVE
FLU B ANTIGEN: NEGATIVE
GFR SERPL CREATININE-BSD FRML MDRD: > 90 ML/MIN/1.73M2
GLUCOSE BLD-MCNC: 149 MG/DL (ref 70–108)
HCT VFR BLD CALC: 43.3 % (ref 42–52)
HEMOGLOBIN: 15 GM/DL (ref 14–18)
IMMATURE GRANS (ABS): 0.13 THOU/MM3 (ref 0–0.07)
IMMATURE GRANULOCYTES: 1.2 %
LYMPHOCYTES # BLD: 22 %
LYMPHOCYTES ABSOLUTE: 2.4 THOU/MM3 (ref 1–4.8)
MCH RBC QN AUTO: 31.5 PG (ref 26–33)
MCHC RBC AUTO-ENTMCNC: 34.6 GM/DL (ref 32.2–35.5)
MCV RBC AUTO: 91 FL (ref 80–94)
MONOCYTES # BLD: 7.9 %
MONOCYTES ABSOLUTE: 0.9 THOU/MM3 (ref 0.4–1.3)
NUCLEATED RED BLOOD CELLS: 0 /100 WBC
OSMOLALITY CALCULATION: 277.8 MOSMOL/KG (ref 275–300)
PLATELET # BLD: 242 THOU/MM3 (ref 130–400)
PMV BLD AUTO: 9.1 FL (ref 9.4–12.4)
POTASSIUM SERPL-SCNC: 4.4 MEQ/L (ref 3.5–5.2)
RBC # BLD: 4.76 MILL/MM3 (ref 4.7–6.1)
SARS-COV-2, NAAT: NOT DETECTED
SEG NEUTROPHILS: 63 %
SEGMENTED NEUTROPHILS ABSOLUTE COUNT: 7 THOU/MM3 (ref 1.8–7.7)
SODIUM BLD-SCNC: 137 MEQ/L (ref 135–145)
TROPONIN T: 0.01 NG/ML
TROPONIN T: < 0.01 NG/ML
WBC # BLD: 11.1 THOU/MM3 (ref 4.8–10.8)

## 2021-08-24 PROCEDURE — 87804 INFLUENZA ASSAY W/OPTIC: CPT

## 2021-08-24 PROCEDURE — 85025 COMPLETE CBC W/AUTO DIFF WBC: CPT

## 2021-08-24 PROCEDURE — 93005 ELECTROCARDIOGRAM TRACING: CPT | Performed by: PHYSICIAN ASSISTANT

## 2021-08-24 PROCEDURE — 99283 EMERGENCY DEPT VISIT LOW MDM: CPT

## 2021-08-24 PROCEDURE — 36415 COLL VENOUS BLD VENIPUNCTURE: CPT

## 2021-08-24 PROCEDURE — 84484 ASSAY OF TROPONIN QUANT: CPT

## 2021-08-24 PROCEDURE — 93010 ELECTROCARDIOGRAM REPORT: CPT | Performed by: INTERNAL MEDICINE

## 2021-08-24 PROCEDURE — 71046 X-RAY EXAM CHEST 2 VIEWS: CPT

## 2021-08-24 PROCEDURE — 80048 BASIC METABOLIC PNL TOTAL CA: CPT

## 2021-08-24 PROCEDURE — 87635 SARS-COV-2 COVID-19 AMP PRB: CPT

## 2021-08-24 ASSESSMENT — ENCOUNTER SYMPTOMS
EYES NEGATIVE: 1
GASTROINTESTINAL NEGATIVE: 1
RESPIRATORY NEGATIVE: 1

## 2021-08-24 NOTE — TELEPHONE ENCOUNTER
Kanika Quiñones @ Dr. Maurilio John office called requesting letter stating \"BS are as controlled as they can be. \"   Spoke to Ever Umaña and Dr. Eduardo Recio and they believe pt BS/A1C can be lower than it is and they both agree pt is working on lowing numbers. Pt is due for A1C the end of September and they will re-evaluate pt A1C at that time to see if maybe he is ready for surgery. Called Kanika Quiñones back and left message regarding this and to call back if any further questions.

## 2021-08-24 NOTE — ED PROVIDER NOTES
 Essential hypertension    Tobacco dependence    Gastroesophageal reflux disease without esophagitis    History of marijuana use    Physical debility    Anemia    Electrolyte imbalance    Type 2 diabetes mellitus with hyperglycemia, with long-term current use of insulin (HCC)    Weakness    Infection of above knee amputation stump of right leg (HCC)    Above knee amputation of right lower extremity (HCC)    Sepsis (HCC)    Vitamin D deficiency    COPD exacerbation (HCC)    Influenza B    Depression, recurrent (HCC)    Major depressive disorder, recurrent (HCC)    GI bleed    Elevated lipase    Other psychoactive substance abuse, uncomplicated (HCC)    Calculus of gallbladder without cholecystitis without obstruction    Right upper quadrant abdominal pain    Pedal edema    MURALI (obstructive sleep apnea)    Shortness of breath    Hypothyroid    Anxiety and depression    Dyspnea    Sinus tachycardia    Metabolic acidosis    Edema of left lower extremity    SOB (shortness of breath) on exertion    Intermittent palpitations    History of chest pain    Dizziness, nonspecific    Hyperlipidemia LDL goal <70    Neuropathy     Physical Exam     Gen:     Non-toxic, well appearing  Head:   AT/NC               EOMI, VANI               Oropharynx Clear, mucous membranes moist  Neck:    Supple, no meningismus; No LAD  Chest:  CTAB    HRRR no mcg  Abd:      SNT/ND; No rebound/guarding/rigidity. Neg McBurneys  Extrem: No edema, neg homans. Neuro:   Alert, Awake, no lateralizing deficts               CN's grossly intact bilaterally    DIAGNOSTIC RESULTS   RADIOLOGY:   XR CHEST (2 VW)   Final Result   Stable radiographic appearance of the chest. No evidence of an acute process. **This report has been created using voice recognition software. It may contain minor errors which are inherent in voice recognition technology. **      Final report electronically signed by Dr. Amos Given on 8/24/2021 9:38 AM          LABS:  Labs Reviewed   BASIC METABOLIC PANEL - Abnormal; Notable for the following components:       Result Value    Glucose 149 (*)     All other components within normal limits   CBC WITH AUTO DIFFERENTIAL - Abnormal; Notable for the following components:    WBC 11.1 (*)     RDW-CV 15.0 (*)     RDW-SD 49.0 (*)     MPV 9.1 (*)     Eosinophils Absolute 0.6 (*)     Immature Grans (Abs) 0.13 (*)     All other components within normal limits   TROPONIN - Abnormal; Notable for the following components:    Troponin T 0.015 (*)     All other components within normal limits   COVID-19, RAPID   RAPID INFLUENZA A/B ANTIGENS   ANION GAP   GLOMERULAR FILTRATION RATE, ESTIMATED   OSMOLALITY   TROPONIN       EMERGENCY DEPARTMENT COURSE:     ED Course as of Aug 24 1106   Tue Aug 24, 2021   1042 Negative   Troponin:    Troponin T < 0.010 [JR]   1043 Negative   COVID-19, Rapid:    SARS-CoV-2, NAAT NOT DETECTED [JR]   1043 Negative   Rapid influenza A/B antigens:    Flu A Antigen Negative   Flu B Antigen Negative [JR]   1043 Normal   Basic Metabolic Panel(!):    Sodium 137   Potassium 4.4   Chloride 102   CO2 23   Glucose 149(!)   BUN 16   Creatinine 0.7   Calcium 9.6 [JR]   1043 Normal   CBC auto differential(!):    WBC 11.1(!)   RBC 4.76   Hemoglobin Quant 15.0   Hematocrit 43.3   MCV 91.0   MCH 31.5   MCHC 34.6   RDW-CV 15.0(!)   RDW-SD 49.0(!)   Platelet Count 966   MPV 9.1(!)   Seg Neutrophils 63.0   Lymphocytes 22.0   Monocytes 7.9   Eosinophils 5.3   Basophils 0.6   Immature Granulocytes 1.2   Segs Absolute 7.0   Lymphocytes Absolute 2.4   Monocytes Absolute 0.9   Eosinophils Absolute 0.6(!)   Basophils Absolute 0.1   Immature Grans (Abs) 0.13(!)   Nucleated Red Blood Cells 0 [JR]   1043 Normal   EKG SOB [JR]   1043 IMPRESSION:  Stable radiographic appearance of the chest. No evidence of an acute process.       [JR]      ED Course User Index  [JR] Irineo Mcdaniel MD       Medical Decision-Making:   EKG without acute ischemia. Work-up so far in the emergency department negative. Patient stable for discharge with close follow-up and strict return precautions.     Ashwin Power  Attending Emergency Physician       Woodard Riedel, DO  08/24/21 7069

## 2021-08-24 NOTE — ED PROVIDER NOTES
[facility] ENCOUNTER          Pt Name: Den Quiroz  MRN: [id number]  [place] 1970  Date of evaluation: 8/24/2021  Treating Resident Physician: Rupal Rich MD  Supervising Physician: Carmen Baxter MD    CHIEF COMPLAINT       Chief Complaint   Patient presents with    Concern For COVID-19    Cough     History obtained from the patient. HISTORY OF PRESENT ILLNESS    HPI  Den Quiroz is a 46 y.o. male who presents to the emergency department for evaluation of sore throat. Patient complaining of 3 days of sore throat, runny nose, sneezing associated with mild shortness of breath, weakness, fatigue, feeling warmer than normal and one episode of diarrhea described as soft stools. Patient denies fever denies chest pain denies headache. Patient is concerned due to COVID-19 positive contact. Patient received COVID-19 vaccine second dose 2 weeks ago. The patient has no other acute complaints at this time. REVIEW OF SYSTEMS   Review of Systems   Constitutional: Negative. HENT: Negative. Eyes: Negative. Respiratory: Negative. Cardiovascular: Negative. Gastrointestinal: Negative. Genitourinary: Negative. Musculoskeletal: Negative. Neurological: Negative. Psychiatric/Behavioral: Negative. PAST MEDICAL AND SURGICAL HISTORY     Past Medical History:   Diagnosis Date    Bipolar 2 disorder [facility])     previously followed with Dr. Dorene Bravo and Rick Loja in \[facility]\"" BPH (benign prostatic hyperplasia)     Diabetes mellitus type 2, uncontrolled ([address]     HgbA1c on 4/2/2019 was 9.1.     Diabetic peripheral neuropathy ([address]     Diabetic polyneuropathy ([address]     Diabetic ulcer of right foot associated with type 2 diabetes mellitus ([address] 12/10/2015    Essential hypertension     \"never been on b/p medication that I know of\"    GERD (gastroesophageal reflux disease)     Hammer toe of left foot     Heart murmur     denies any chest pain or palpitations    History of tobacco abuse     Hx of AKA (above knee amputation), right ([facility] 04/18/2019    Dr. Borrero Devoid edema, diabetic, bilateral ([facility] 05/04/2018    Dr. Jackie Oliveros referred to retina specialist for 2nd opinion    Marijuana abuse in remission     Melena     MRSA (methicillin resistant staph aureus) culture positive     Onychomycosis     Other disorders of kidney and ureter in diseases classified elsewhere     Sleep apnea     no cpap    Thyroid disease     WD-Skin ulcer of fourth toe of right foot with necrosis of bone ([facility] 6/29/2016     Past Surgical History:   Procedure Laterality Date    ABSCESS DRAINAGE Right     foot    AMPUTATION ABOVE KNEE Right 4/18/2019    RIGHT ABOVE KNEE AMPUTATION performed by Omero Ferreira MD at [address] [place] [address], LAPAROSCOPIC N/A 4/1/2020    ROBOTIC CHOLECYSTECTOMY performed by Lakshmi Pruett MD at [place] [address] N/A 7/20/2020    CYSTOSCOPY performed by Kwabena Negron MD at [address] [facility] 8/21/2020    Power Kim performed by Kwabena Negron MD at [facility], DIAGNOSTIC     [facility] Right 07/01/2016    I & D    INCISION AND DRAINAGE Right 2/18/2019    I&D RIGHT STUMP performed by Omero Ferreira MD at [facility] Floor Right 07/20/2016    LEG AMPUTATION BELOW KNEE Right 4/4/2019    I&D AND REVISION OF AMPUTATION RIGHT LEG performed by Omero Ferreira MD at [facility] Right 1/14/15    sole of foot I&D    NE DRAIN INFECT SHOULDER BURSA Left 8/18/2017    LEFT SHOULDER INCISION AND DRAINAGE performed by Omero Ferreira MD at [facility] OFFICE/OUTPT [facility] Right 9/20/2018    EXCISIONAL DEBRIDEMENT RIGHT BKA STUMP performed by Maximus Bautista MD at [address] Right 1/16/15    2nd toe with wound vac applied    UPPER GASTROINTESTINAL ENDOSCOPY N/A 3/3/2020    EGD DILATION SAVORY performed by Zoila Yadav MD at [facility]  ? when         MEDICATIONS   No current facility-administered medications for this encounter. Current Outpatient Medications:     nitroGLYCERIN (NITROSTAT) 0.3 MG SL tablet, Place 1 tablet under the tongue every 5 minutes as needed for Chest pain up to max of 3 total doses. If no relief after 1 dose, call 911., Disp: 30 tablet, Rfl: 3    empagliflozin (JARDIANCE) 25 MG tablet, Take 1 tablet by mouth daily New dose, Disp: 30 tablet, Rfl: 5    gabapentin (NEURONTIN) 300 MG capsule, TAKE 1 CAPSULE BY MOUTH THREE TIMES DAILY. , Disp: 90 capsule, Rfl: 2    Calcium-Phosphorus-Vitamin D (CALCIUM/D3 ADULT GUMMIES PO), Take 2 tablets by mouth daily, Disp: , Rfl:     sertraline (ZOLOFT) 50 MG tablet, Take 50 mg by mouth daily, Disp: , Rfl:     Multiple Vitamins-Minerals (THERAPEUTIC MULTIVITAMIN-MINERALS) tablet, Take 1 tablet by mouth daily, Disp: , Rfl:     aspirin 81 MG chewable tablet, Take 1 tablet by mouth daily, Disp: 30 tablet, Rfl: 3    atorvastatin (LIPITOR) 40 MG tablet, Take 1 tablet by mouth nightly, Disp: 30 tablet, Rfl: 3    fenofibrate (TRICOR) 145 MG tablet, Take 1 tablet by mouth daily, Disp: 30 tablet, Rfl: 3    levothyroxine (SYNTHROID) 50 MCG tablet, Take 1 tablet by mouth daily, Disp: 90 tablet, Rfl: 1    metoprolol tartrate (LOPRESSOR) 50 MG tablet, Take 1 tablet by mouth 2 times daily, Disp: 90 tablet, Rfl: 3    Insulin Glargine, 2 Unit Dial, 300 UNIT/ML SOPN, Change to 75 units BID and every 10 days increase by 5 units both times, to get am sugars 150-200, Disp: 15 pen, Rfl: 2    insulin lispro (HUMALOG KWIKPEN U-200) 200 UNIT/ML SOPN pen, 30 units Plus scale - Max dose 150 units per day, Disp: 15 pen, Rfl: 1    Insulin Pen Needle (PEN NEEDLES) 31G X 8 MM MISC, For use with insulin injections five timed per day dx:E11.9, Disp: 150 each, Rfl: 5    Continuous Blood Gluc Transmit (DEXCOM G6 TRANSMITTER) MISC, 1 Device by Does not apply route every 3 months, Disp: 1 each, Rfl: 3    Continuous Blood Gluc Sensor (DEXCOM G6 SENSOR) MISC, 1 Device by Does not apply route every 14 days, Disp: 9 each, Rfl: 3    Continuous Blood Gluc  (DEXCOM G6 ) LIANNE, 1 Device by Does not apply route 4 times daily (before meals and nightly), Disp: 1 Device, Rfl: 0    lisinopril (PRINIVIL;ZESTRIL) 5 MG tablet, Take 1 tablet by mouth daily, Disp: 90 tablet, Rfl: 1    ARIPiprazole (ABILIFY) 5 MG tablet, Take 5 mg by mouth daily , Disp: , Rfl:       SOCIAL HISTORY     Social History     Social History Narrative    Not on file     Social History     Tobacco Use    Smoking status: Former Smoker     Packs/day: 1.00     Years: 10.00     Pack years: 10.00     Types: Cigars     Start date: 1985     Quit date:      Years since quittin.0    Smokeless tobacco: Never Used   Vaping Use    Vaping Use: Every day    Substances: Nicotine, Flavoring   Substance Use Topics    Alcohol use: Not Currently     Alcohol/week: 0.0 standard drinks    Drug use: No     Comment: 30 YEARS AGO         ALLERGIES     Allergies   Allergen Reactions    Pcn [Penicillins] Shortness Of Breath, Nausea And Vomiting and Other (See Comments)     Does not remember reactions. Has TOLERATED amoxicillin and several different cephalosporins.  Actos [Pioglitazone] Swelling    Clindamycin/Lincomycin Itching    Vancomycin Hives      Rash, with erythematous plaques to abdomen, shoulders, and proximal upper extremities with pruritis, persisted with 2nd dose administered slower.       Metformin And Related Swelling         FAMILY HISTORY     Family History   Problem Relation Age of Onset    Diabetes Mother     Other Mother         pneumonia, H1N1    Depression Mother     Early Death Mother     High Blood Pressure Mother     High Cholesterol Mother     Vision Loss Maternal Grandmother     Arthritis Maternal Grandfather     Heart Disease Maternal Grandfather          PREVIOUS RECORDS   Previous records reviewed: Previous medical history of sleep apnea, GERD, hypertension, diabetes, diabetic neuropathy, MRSA positive. Above-the-knee amputation right leg 2017. Currently taking atorvastatin, fenofibrate, metoprolol, lisinopril, insulin glargine/lispro, gabapentin. Jose Luis Hughes PHYSICAL EXAM     ED Triage Vitals   BP Temp Temp src Pulse Resp SpO2 Height Weight   08/24/21 0745 08/24/21 0745 -- 08/24/21 0745 08/24/21 0745 08/24/21 0745 08/24/21 0741 08/24/21 0741   126/86 97.8 °F (36.6 °C)  97 26 98 % 5' 6\" (1.676 m) 220 lb (99.8 kg)     Initial vital signs and nursing assessment reviewed and abnormal from Tachycardia. Body mass index is 35.51 kg/m². Pulsoximetry is normal per my interpretation. Additional Vital Signs:  Vitals:    08/24/21 1046   BP: (!) 127/59   Pulse: 97   Resp: 20   Temp:    SpO2: 95%       Physical Exam  Constitutional:       Appearance: Normal appearance. HENT:      Head: Normocephalic and atraumatic. Right Ear: There is impacted cerumen. Left Ear: Tympanic membrane, ear canal and external ear normal.      Nose: Rhinorrhea present. Mouth/Throat:      Pharynx: Posterior oropharyngeal erythema present. Eyes:      Conjunctiva/sclera: Conjunctivae normal.      Pupils: Pupils are equal, round, and reactive to light. Cardiovascular:      Rate and Rhythm: Normal rate and regular rhythm. Pulses: Normal pulses. Heart sounds: Normal heart sounds. Pulmonary:      Effort: Pulmonary effort is normal. No respiratory distress. Breath sounds: Normal breath sounds. No stridor. No wheezing, rhonchi or rales. Chest:      Chest wall: No tenderness. Abdominal:      General: Abdomen is flat. There is no distension. Palpations: Abdomen is soft. There is no mass. Tenderness: There is no abdominal tenderness. There is no guarding or rebound.       Hernia: No hernia is present. Musculoskeletal:         General: Normal range of motion. Cervical back: No rigidity or tenderness. Comments: Left leg with 2 ulcers no signs of infection. Lymphadenopathy:      Cervical: No cervical adenopathy. Skin:     General: Skin is warm. Capillary Refill: Capillary refill takes less than 2 seconds. Neurological:      General: No focal deficit present. Mental Status: He is alert and oriented to person, place, and time. Psychiatric:         Mood and Affect: Mood normal.         Behavior: Behavior normal.             MEDICAL DECISION MAKING   Initial Assessment:   3 46year-old patient, diabetic, hypertensive with COVID-19 positive contact today complaining of upper respiratory symptoms. Due to risk factors we will consider to rule out COVID-19 positive infection has well has other upper respiratory infections. We consider mild shortness of breath has chest pain equivalent we will rule out coronary event. 2. Initial diagnosis  a. Upper respiratory infection  b. Shortness of breath/chest pain moderate risk. Plan:    CBC, BMP, chest x-ray, troponin, COVID-19/flu swab.    Reassessment        ED RESULTS   Laboratory results:  Labs Reviewed   BASIC METABOLIC PANEL - Abnormal; Notable for the following components:       Result Value    Glucose 149 (*)     All other components within normal limits   CBC WITH AUTO DIFFERENTIAL - Abnormal; Notable for the following components:    WBC 11.1 (*)     RDW-CV 15.0 (*)     RDW-SD 49.0 (*)     MPV 9.1 (*)     Eosinophils Absolute 0.6 (*)     Immature Grans (Abs) 0.13 (*)     All other components within normal limits   TROPONIN - Abnormal; Notable for the following components:    Troponin T 0.015 (*)     All other components within normal limits   COVID-19, RAPID   RAPID INFLUENZA A/B ANTIGENS   ANION GAP   GLOMERULAR FILTRATION RATE, ESTIMATED   OSMOLALITY   TROPONIN       Radiologic studies results:  XR CHEST (2 VW)   Final however second troponin is negative, EKG is normal, patient denies chest pain, shortness of breath, any other equivalent chest pain. Decision to discharge with alarm signs, recommendations, follow-up with primary care physician in 2 days symptomatic management for upper respiratory infection/viral pharyngitis. Strongly recommend patient get tested for Covid within the next week    Strict return precautions and follow up instructions were discussed with the patient prior to discharge, with which the patient agrees. MEDICATION CHANGES     Current Discharge Medication List            FINAL DISPOSITION     Final diagnoses:   Acute upper respiratory infection     Condition: condition: good  Dispo: Discharge to home      This transcription was electronically signed. Parts of this transcriptions may have been dictated by use of voice recognition software and electronically transcribed, and parts may have been transcribed with the assistance of an ED scribe. The transcription may contain errors not detected in proofreading. Please refer to my supervising physician's documentation if my documentation differs.     Electronically Signed: Claire Corbin MD, 08/24/21, 12:00 PM         Claire Corbin MD  Resident  08/24/21 1200

## 2021-08-24 NOTE — ED TRIAGE NOTES
Patient presents to the ED with complaints of shortness of breath and cough that has been ongoing for the last few days. Patient states that he found out yesterday that he was exposed for covid. Patient also reports some fatigue and pharyngitis. Patient denies any pain. EKG and covid swab completed at bedside.

## 2021-09-08 ENCOUNTER — HOSPITAL ENCOUNTER (EMERGENCY)
Age: 51
Discharge: HOME | End: 2021-09-08
Payer: MEDICARE

## 2021-09-08 ENCOUNTER — APPOINTMENT (OUTPATIENT)
Dept: GENERAL RADIOLOGY | Age: 51
End: 2021-09-08
Payer: MEDICARE

## 2021-09-08 VITALS
SYSTOLIC BLOOD PRESSURE: 150 MMHG | RESPIRATION RATE: 16 BRPM | TEMPERATURE: 98 F | HEART RATE: 99 BPM | DIASTOLIC BLOOD PRESSURE: 80 MMHG | OXYGEN SATURATION: 96 %

## 2021-09-08 DIAGNOSIS — E11.65 UNCONTROLLED TYPE 2 DIABETES MELLITUS WITH HYPERGLYCEMIA, WITH LONG-TERM CURRENT USE OF INSULIN (HCC): ICD-10-CM

## 2021-09-08 DIAGNOSIS — Z79.4 UNCONTROLLED TYPE 2 DIABETES MELLITUS WITH HYPERGLYCEMIA, WITH LONG-TERM CURRENT USE OF INSULIN (HCC): ICD-10-CM

## 2021-09-08 DIAGNOSIS — S93.402A SPRAIN OF LEFT ANKLE, UNSPECIFIED LIGAMENT, INITIAL ENCOUNTER: Primary | ICD-10-CM

## 2021-09-08 PROCEDURE — 99282 EMERGENCY DEPT VISIT SF MDM: CPT

## 2021-09-08 PROCEDURE — 73610 X-RAY EXAM OF ANKLE: CPT

## 2021-09-08 ASSESSMENT — PAIN DESCRIPTION - LOCATION: LOCATION: ANKLE

## 2021-09-08 ASSESSMENT — PAIN DESCRIPTION - PAIN TYPE: TYPE: ACUTE PAIN

## 2021-09-08 ASSESSMENT — PAIN DESCRIPTION - ORIENTATION: ORIENTATION: LEFT

## 2021-09-08 ASSESSMENT — PAIN SCALES - GENERAL: PAINLEVEL_OUTOF10: 10

## 2021-09-08 NOTE — ED PROVIDER NOTES
Reason for Visit: Ankle Pain      HISTORY OF PRESENT ILLNESS       HPI: This 46 y.o. malepresents to the emergency department for left ankle pain status post twisting ankle 1 to 2 days prior to arrival.  Aching in nature constant worse with standing up makes better moderate severity. Patient is wheelchair-bound with above-the-knee amp right-sided. Past Medical History:   Diagnosis Date    Bipolar 2 disorder [facility])     previously followed with Dr. Glory Davis and Cinda Tracey in [facility] BPH (benign prostatic hyperplasia)     Diabetes mellitus type 2, uncontrolled ([address]     HgbA1c on 4/2/2019 was 9.1.     Diabetic peripheral neuropathy (HCC)     Diabetic polyneuropathy ([address]     Diabetic ulcer of right foot associated with type 2 diabetes mellitus ([address] 12/10/2015    Essential hypertension     \"never been on b/p medication that I know of\"    GERD (gastroesophageal reflux disease)     Hammer toe of left foot     Heart murmur     denies any chest pain or palpitations    History of tobacco abuse     Hx of AKA (above knee amputation), right ([address] 04/18/2019    Dr. Chisholm Cindy edema, diabetic, bilateral ([address] 05/04/2018    Dr. Isacc Moreno referred to retina specialist for 2nd opinion    Marijuana abuse in remission     Melena     MRSA (methicillin resistant staph aureus) culture positive     Onychomycosis     Other disorders of kidney and ureter in diseases classified elsewhere     Sleep apnea     no cpap    Thyroid disease     WD-Skin ulcer of fourth toe of right foot with necrosis of bone ([address] 6/29/2016       Past Surgical History:   Procedure Laterality Date    ABSCESS DRAINAGE Right     foot    AMPUTATION ABOVE KNEE Right 4/18/2019    RIGHT ABOVE KNEE AMPUTATION performed by Mario Juan MD at [address] [place] [address], LAPAROSCOPIC N/A 4/1/2020    ROBOTIC CHOLECYSTECTOMY performed by Azael Lucas MD at [facility] [address] 7/20/2020    CYSTOSCOPY performed by López Camargo MD at [address] 2020    José Miguel Ford performed by López Camargo MD at [address] [facility] [address], DIAGNOSTIC      FOOT DEBRIDEMENT Right 2016    I & D    INCISION AND DRAINAGE Right 2019    I&D RIGHT STUMP performed by Riley Gurrola MD at [address] [facility] Right 2016    LEG AMPUTATION BELOW KNEE Right 2019    I&D AND REVISION OF AMPUTATION RIGHT LEG performed by Riley Gurrola MD at [address] [facility] Right 1/14/15    sole of foot I&D    WI DRAIN INFECT SHOULDER BURSA Left 2017    LEFT SHOULDER INCISION AND DRAINAGE performed by Riley Gurrola MD at [address] [facility] OFFICE/OUTPT [facility] Right 2018    EXCISIONAL DEBRIDEMENT RIGHT BKA STUMP performed by Anthony Carrasco MD at [address] Right 1/16/15    2nd toe with wound vac applied    UPPER GASTROINTESTINAL ENDOSCOPY N/A 3/3/2020    EGD DILATION SAVORY performed by Kadie Omalley MD at [address] [facility]  ? when       [facility] History     Socioeconomic History    Marital status:      Spouse name: Not on file    Number of children: 2    Years of education: 15    Highest education level: Not on file   Occupational History    Not on file   Tobacco Use    Smoking status: Former Smoker     Packs/day: 1.00     Years: 10.00     Pack years: 10.00     Types: Cigars     Start date: 1985     Quit date: 2000     Years since quittin.0    Smokeless tobacco: Never Used   Vaping Use    Vaping Use: Every day    Substances: Nicotine, Flavoring   Substance and Sexual Activity    Alcohol use: Not Currently     Alcohol/week: 0.0 standard drinks    Drug use: No     Comment: 30 YEARS AGO    Sexual activity: Yes     Partners: Female   Other Topics Concern    Not on file   Social History Narrative    Not on file     Social Determinants of Health     Financial Resource Strain: Low Risk     Difficulty of Paying Living Expenses: Not very hard   Food Insecurity: No Food Insecurity    Worried About Running Out of Food in the Last Year: Never true    Gwen of Food in the Last Year: Never true   Transportation Needs: No Transportation Needs    Lack of Transportation (Medical): No    Lack of Transportation (Non-Medical): No   Physical Activity:     Days of Exercise per Week:     Minutes of Exercise per Session:    Stress:     Feeling of Stress :    Social Connections:     Frequency of Communication with Friends and Family:     Frequency of Social Gatherings with Friends and Family:     Attends [employer] Services:     Active Member of Clubs or Organizations:     Attends Club or Organization Meetings:     Marital Status:    Intimate Partner Violence:     Fear of Current or Ex-Partner:     Emotionally Abused:     Physically Abused:     Sexually Abused:        Previous Medications    ARIPIPRAZOLE (ABILIFY) 5 MG TABLET    Take 5 mg by mouth daily     ASPIRIN 81 MG CHEWABLE TABLET    Take 1 tablet by mouth daily    ATORVASTATIN (LIPITOR) 40 MG TABLET    Take 1 tablet by mouth nightly    CALCIUM-PHOSPHORUS-VITAMIN D (CALCIUM/D3 ADULT GUMMIES PO)    Take 2 tablets by mouth daily    CONTINUOUS BLOOD GLUC  (DEXCOM G6 ) LIANNE    1 Device by Does not apply route 4 times daily (before meals and nightly)    CONTINUOUS BLOOD GLUC SENSOR (DEXCOM G6 SENSOR) MISC    1 Device by Does not apply route every 14 days    CONTINUOUS BLOOD GLUC TRANSMIT (DEXCOM G6 TRANSMITTER) MISC    1 Device by Does not apply route every 3 months    EMPAGLIFLOZIN (JARDIANCE) 25 MG TABLET    Take 1 tablet by mouth daily New dose    FENOFIBRATE (TRICOR) 145 MG TABLET    Take 1 tablet by mouth daily    GABAPENTIN (NEURONTIN) 300 MG CAPSULE    TAKE 1 CAPSULE BY MOUTH THREE TIMES DAILY.     INSULIN GLARGINE, 2 UNIT DIAL, 300 UNIT/ML SOPN    Change to 75 units BID and every 10 days increase by 5 units both times, to get am sugars 150-200    INSULIN LISPRO (HUMALOG KWIKPEN U-200) 200 UNIT/ML SOPN PEN    40 units Plus scale - Max dose 160 units per day    INSULIN PEN NEEDLE (PEN NEEDLES) 31G X 8 MM MISC    For use with insulin injections five timed per day dx:E11.9    LEVOTHYROXINE (SYNTHROID) 50 MCG TABLET    Take 1 tablet by mouth daily    LISINOPRIL (PRINIVIL;ZESTRIL) 5 MG TABLET    Take 1 tablet by mouth daily    METOPROLOL TARTRATE (LOPRESSOR) 50 MG TABLET    Take 1 tablet by mouth 2 times daily    MULTIPLE VITAMINS-MINERALS (THERAPEUTIC MULTIVITAMIN-MINERALS) TABLET    Take 1 tablet by mouth daily    NITROGLYCERIN (NITROSTAT) 0.3 MG SL TABLET    Place 1 tablet under the tongue every 5 minutes as needed for Chest pain up to max of 3 total doses. If no relief after 1 dose, call 911. SERTRALINE (ZOLOFT) 50 MG TABLET    Take 50 mg by mouth daily       Allergies: Allergies as of 09/08/2021 - Fully Reviewed 09/08/2021   Allergen Reaction Noted    Pcn [penicillins] Shortness Of Breath, Nausea And Vomiting, and Other (See Comments) 11/15/2014    Actos [pioglitazone] Swelling 06/11/2020    Clindamycin/lincomycin Itching 09/29/2015    Vancomycin Hives 05/13/2020    Metformin and related Swelling 05/14/2021       Review of Systems       See HPI for further details. At least 10 systems reviewed and are otherwise negative unless noted in the history of present illness.      Constitutional: Denies fever or chills   Respiratory: Denies cough or shortness of breath   Cardiovascular: Denies chest pain or edema   Musculoskeletal: Denies back pain admits joint pain   Integument: Denies rash   Neurologic: Denies headache, focal weakness or sensory changes   Endocrine: Denies polyuria or polydipsia   Lymphatic: Denies swollen glands   Psychiatric: Denies depression or anxiety       Physical Exam       Vitals:    09/08/21 1250   BP: (!) 150/80   Pulse: 99   Resp: 16   Temp: 98 °F (36.7 °C)   TempSrc: Oral SpO2: 96%       Constitutional: No acute distress, Non-toxic appearance; well nourished    HENT: Normocephalic, Atraumatic, Bilateral external ears normal, Oropharynx moist, No oral exudates, Nose normal.    Eyes: PERRLA, EOMI, Conjunctiva normal, No discharge. Neck: Normal range of motion, No tenderness, Supple, No lymphadenopathy, No stridor. Cardiovascular: Normal heart rate, Normal rhythm, No murmurs, No rubs, No gallops. Pulmonary/Chest: Normal breath sounds, No respiratory distress, No wheezing    Extremities: Normal range of motion, Intact distal pulses, with left ankle edema and tenderness; right AKA    Neurologic: Alert & oriented x 3; Normal motor function, Normal sensory function, No focal defecits    Skin: Warm, Dry, No erythema, No rash    Psychiatric: Alert and oriented to person, place, and time. Patient maintains good eye contact. Mood and affect were normal. Concentration appeared normal      Diagnostic Studies       Please see electronic medical record for any tests performed in the ED. Labs:    Labs Reviewed - No data to display    Radiology:    XR ANKLE LEFT (MIN 3 VIEWS)   Final Result      1. Soft tissue swelling without evidence of acute osseous injury of the left ankle. 2. Stable mild degenerative changes at the ankle. 3. Stable calcaneal enthesophytes. **This report has been created using voice recognition software. It may contain minor errors which are inherent in voice recognition technology. **      Final report electronically signed by Dr. Roxie Maddox MD on 9/8/2021 1:19 PM        Emergency Department Procedures         ED Course and MDM       Keely Mcclelland is a 46 y.o. male who presented to the emergency department with a chief complaint of left ankle pain     Patient was seen, history and physical exam was performed. Patient remains stable here in the emergency department.  Patient's imaging studies and physical examination findings were reviewed and were reassuring. Labs Reviewed - No data to display  Medications - No data to display  New Prescriptions    No medications on file         Counseling     The emergency provider has spoken with the patient and discussed today's findings, in addition to providing specific details for the plan of care. Questions are answered and there is agreement with the plan. Patient was given clear discharge and followup instructions including return to the ER immediately for worsening concerns. Patient is advised to followup with primary care physician and/or referred physician in the next one to two days or sooner if worsening and to return to the ER immediately as above with any concerns. Usual and customary treatment and medication side effects and warning signs discussed. Patient was discharged from emergency department in good condition with all questions answered and patient has demonstrated capacity to understand these instructions and to follow up. Refer to the patient's discharge summary for more information on plan and follow-up. I have explained to the patient in appropriate terminology our work-up in the ED and their diagnosis. I have also given anticipatory guidance and expectant management of their condition as an outpatient as per my custom. They are instructed to return to the ED if their condition deteriorates in any way    Of note, this patient's blood pressure was elevated here in the department however they are asymptomatic at this time. Patient educated on how to check blood pressure at home and urged to closely follow up with a primary care provider for their blood pressure. This patient was seen under the direct supervision of the attending physician who was available for consultation. Differential Diagnosis   Ankle sprain versus fracture versus dislocation    Consults: None     Reevaluation:  Nurse applied an Ace wrap to the left ankle.   Post examination reveals neurovascular intact good alignment. DECISION to ADMIT / DISCHARGE:     1:32 PM    Clinical Impression       1.   1. Sprain of left ankle, unspecified ligament, initial encounter        Disposition       All results were shared with the patient, medical decision making was discussed and all questions were answered, and he agreed to assessment and plan. Patient was DISCHARGED from the hospital. Based on the reassuring ED workup and patient's stable vital signs, I feel the patient may be safely discharged home. At this point in time, I believe the patient has the mental capacity to make medical decisions.         Jens Duncan  09/08/21 1940

## 2021-09-16 ENCOUNTER — TELEPHONE (OUTPATIENT)
Dept: INTERNAL MEDICINE CLINIC | Age: 51
End: 2021-09-16

## 2021-09-16 NOTE — TELEPHONE ENCOUNTER
Per Dr. Kristina Cornejo request, he asked that we try to have the patient come in sooner to the [facility] if we had any cancellations. I called the patient and left a message asking if he would be able to come in on Monday, 9/20/2021 at 11am for a visit. I asked for a return call to let us know.

## 2021-09-20 ENCOUNTER — OFFICE VISIT (OUTPATIENT)
Dept: INTERNAL MEDICINE CLINIC | Age: 51
End: 2021-09-20
Payer: MEDICARE

## 2021-09-20 VITALS
HEART RATE: 104 BPM | HEIGHT: 66 IN | BODY MASS INDEX: 35.51 KG/M2 | SYSTOLIC BLOOD PRESSURE: 150 MMHG | TEMPERATURE: 97.2 F | DIASTOLIC BLOOD PRESSURE: 74 MMHG

## 2021-09-20 DIAGNOSIS — Z79.4 TYPE 2 DIABETES MELLITUS WITH HYPERGLYCEMIA, WITH LONG-TERM CURRENT USE OF INSULIN (HCC): ICD-10-CM

## 2021-09-20 DIAGNOSIS — E11.65 TYPE 2 DIABETES MELLITUS WITH HYPERGLYCEMIA, WITH LONG-TERM CURRENT USE OF INSULIN (HCC): ICD-10-CM

## 2021-09-20 PROCEDURE — G0108 DIAB MANAGE TRN  PER INDIV: HCPCS | Performed by: INTERNAL MEDICINE

## 2021-09-20 RX ORDER — INSULIN PUMP CONTROLLER
EACH MISCELLANEOUS
COMMUNITY
Start: 2021-08-27 | End: 2021-11-29

## 2021-09-20 ASSESSMENT — PATIENT HEALTH QUESTIONNAIRE - PHQ9
SUM OF ALL RESPONSES TO PHQ9 QUESTIONS 1 & 2: 0
2. FEELING DOWN, DEPRESSED OR HOPELESS: NOT AT ALL
DEPRESSION UNABLE TO ASSESS: YES
1. LITTLE INTEREST OR PLEASURE IN DOING THINGS: NOT AT ALL

## 2021-09-20 NOTE — PATIENT INSTRUCTIONS
Set goal to limit carbohydrates to 90 grams at any meal    Try to limit carbohydrates between meals to only 10-15 grams                --watch how much chocolate milk and sugar candies you take   *keep track of how many carbohydrates you are eating so                When you are wearing the pump you can enter the grams of carb                Eaten! We will contact [employer] about training on the OmniPod pump         -will request script for Humalog vials  Good job with your daily afternoon workout!

## 2021-09-20 NOTE — PROGRESS NOTES
[facility]  [address] [facility] [address]  [phone] (phone)  [phone] (fax)    Patient ID: Vale Anglin [id number]  Referring Provider: Dr. Alireza Hills     Patient's name and  were verified. Subjective:    He presents for His follow-up diabetic visit. He has type 2 diabetes mellitus. Home regimen includes: Insulin He is noncompliant some of the time. Assessment:     Lab Results   Component Value Date    LABA1C 9.6 2021    LABA1C 10.2 2021    BUN 16 2021    CREATININE 0.7 2021     There were no vitals filed for this visit. Wt Readings from Last 3 Encounters:   21 220 lb (99.8 kg)   21 225 lb (102.1 kg)   21 225 lb (102.1 kg)     Ht Readings from Last 3 Encounters:   21 5' 6\" (1.676 m)   21 5' 6\" (1.676 m)   21 5' 6\" (1.676 m)       Glucose at 3 hrs PPD today resulted at 106mg/dl  Current monitoring regimen: home blood tests - 4 times daily  Home blood sugar trends: Variable results. Glucose levels fairly stable bedtime to fasting but rise with excess carb inake and random carb intake between meals  Any episodes of hypoglycemia? yes - 1-2 times per week; highest risk  Depression screening completed 21 score 0  Previous visit with dietician: yes - 21  Current diet: B 2 cups oatmeal; banana 90g carbs                      L chicken and 2-3 cups rice 120gm carbs                       D  Ham salad sandwich 30 gms carbs                      Kim milk -random 1-2 'swallows\"                     Random SF candy 3-4 per day or ice cream sandwich                      Coke and Mt Dew 0 plus 1 gallon water per day  Current exercise: work out upper body 30-60 minutes 7 days per week  Eye exam current (within one year): no Dr.Pajka venus mack-no treatment. 2019  Any history of foot problems? Rt AKA hx.   Last foot exam: 9/20/20 Pedal pulses:   Dorsalis pedis, left: palpable   Posterior tibial, left: palpable   Results of monofilament test: desensate Left foot             Skin noted to be warm, pale, dry and scaly and hair is absent. General calluses. He is told he has calcified achillis tendon. Waiting to hae this repaired  Immunizations up to date: yes - 2020  Taking ASA:  yes  Appropriate for use of MyChart Glucose Grid:  Yes    Focus:     Diabetes education. Recent A1C 9.6%. Clari Parson is is very frustrated that he cannot have surgery on his left foot until his blood sugars are in better control. He wants to get started on his insulin pump. Discussed setting limits on his carbohydrate intake, but he gets agitated and states he is already starving and will not eat any less than he is currently doing (his meals are consisting of  grams most meals). He is not willing to change. He will not dai up his chocolate milk at and between meals. Activity is limited but he does do a workout every afternoon. He presents with the OmniPod insulin pump. Will discuss with provider regarding pump settings/ initiating pump therapy. Follow up 3 weeks. DSME PLAN:   Discussed general issues about diabetes pathophysiology and management. Counseling at today's visit: Carbohydrate recommendations; exercise; BG goals; treating low blood sugars; pump considerations. Set goal to limit carbohydrates to 90 grams at any meal    Try to limit carbohydrates between meals to only 10-15 grams                --watch how much chocolate milk and sugar candies you take   *keep track of how many carbohydrates you are eating so                When you are wearing the pump you can enter the grams of carb                Eaten! We will contact [employer] about training on the OmniPod pump         -will request script for Humalog vials  Good job with your daily afternoon workout! Meter download, medications, PMH and nursing assessment reviewed.   Lynette Butterfield states He is willing to participate in this plan of care and verbalized understanding of all instructions

## 2021-09-24 ENCOUNTER — TELEPHONE (OUTPATIENT)
Dept: INTERNAL MEDICINE CLINIC | Age: 51
End: 2021-09-24

## 2021-09-24 NOTE — TELEPHONE ENCOUNTER
Sil Ayers did not present for OmniPod insulin pump training at 10am. Call to Sil Ayers to check on his status/ went directly to voicemail. Left message to call the clinic back ASAP regarding not making appointment. Is he doing ok?

## 2021-09-27 ENCOUNTER — TELEPHONE (OUTPATIENT)
Dept: INTERNAL MEDICINE CLINIC | Age: 51
End: 2021-09-27

## 2021-09-27 NOTE — TELEPHONE ENCOUNTER
Nona Prater again and had to leave a message asking for a return call to make sure he is ok after not showing for his pump start on Friday, 9/24/2021.

## 2021-10-05 NOTE — TELEPHONE ENCOUNTER
Cuca Malone left a message after the initial call, stating he couldn't get a ride.  Another message left to call the clinic about rescheduling, but Cuca Malone has not responded as of 10/5/21

## 2021-10-07 ENCOUNTER — OFFICE VISIT (OUTPATIENT)
Dept: INTERNAL MEDICINE CLINIC | Age: 51
End: 2021-10-07
Payer: MEDICARE

## 2021-10-07 ENCOUNTER — TELEPHONE (OUTPATIENT)
Dept: INTERNAL MEDICINE CLINIC | Age: 51
End: 2021-10-07

## 2021-10-07 DIAGNOSIS — E11.65 UNCONTROLLED TYPE 2 DIABETES MELLITUS WITH HYPERGLYCEMIA, WITH LONG-TERM CURRENT USE OF INSULIN (HCC): Chronic | ICD-10-CM

## 2021-10-07 DIAGNOSIS — E11.65 TYPE 2 DIABETES MELLITUS WITH HYPERGLYCEMIA, WITH LONG-TERM CURRENT USE OF INSULIN (HCC): Primary | ICD-10-CM

## 2021-10-07 DIAGNOSIS — Z79.4 TYPE 2 DIABETES MELLITUS WITH HYPERGLYCEMIA, WITH LONG-TERM CURRENT USE OF INSULIN (HCC): Primary | ICD-10-CM

## 2021-10-07 DIAGNOSIS — Z79.4 UNCONTROLLED TYPE 2 DIABETES MELLITUS WITH HYPERGLYCEMIA, WITH LONG-TERM CURRENT USE OF INSULIN (HCC): Chronic | ICD-10-CM

## 2021-10-07 LAB — HBA1C MFR BLD: 7 % (ref 4.3–5.7)

## 2021-10-07 PROCEDURE — 83036 HEMOGLOBIN GLYCOSYLATED A1C: CPT | Performed by: INTERNAL MEDICINE

## 2021-10-07 PROCEDURE — G0108 DIAB MANAGE TRN  PER INDIV: HCPCS | Performed by: INTERNAL MEDICINE

## 2021-10-07 NOTE — TELEPHONE ENCOUNTER
Lab Results   Component Value Date    LABA1C 7.0 (H) 10/07/2021     Per Dr. Maryam Coffey, please call the office of Dr. Marlow Faster & inform of today's improvement in A1c. Sophie Pham should now be able to proceed with foot surgery.

## 2021-10-07 NOTE — PATIENT INSTRUCTIONS
1. Pharmacist to reach out to Dr. Tim Quezada about your A1c results!!  2. Touch base with Dr. Subramanian Hendricks about your eye exam  3. Practice Carb Counting:  [place].nl. N-Trig/us/en/    4. Pump Training 10/13 from 9a-11a   -Omnipods   -Insulin pens - U-200 Humalog   -Instruction manuals   -Charge PDM   -Don't take Lantus the night before or morning of your appointment    Examples:  1.5-2 cups spaghetti w/ sauce: 68g carbs  2 packets of Bahai brown sugar oatmeal w/ almond milk (unsweet): 64g  18 grapes: 15g  2 c.  Low fat cottage cheese: 12g

## 2021-10-07 NOTE — PROGRESS NOTES
[facility]  [facility] [address] [address] [address], [facility] [place], [address]  [phone] (phone)  [phone] (fax)    Patient ID: Eric Old [id number]  Referring Provider: Dr. Kymberly Yusuf     Patient's name and  were verified. Subjective:    He presents for His follow-up diabetic visit. He has type 2 diabetes mellitus. Home regimen includes: Insulin and SGLT-2 inhibitors He is compliant most of the time. Assessment:     Lab Results   Component Value Date    LABA1C 7.0 10/07/2021    LABA1C 10.2 2021    BUN 16 2021    CREATININE 0.7 2021     Vitals:    10/07/21 0708   BP: (!) 188/84   Pulse: 99   Temp: 97 °F (36.1 °C)     Wt Readings from Last 3 Encounters:   21 220 lb (99.8 kg)   21 225 lb (102.1 kg)   21 225 lb (102.1 kg)     Ht Readings from Last 3 Encounters:   21 5' 6\" (1.676 m)   21 5' 6\" (1.676 m)   21 5' 6\" (1.676 m)       Diabetes Pharmacotherapy:  Lantus 80 units BID  Humalog 45 TID + Group 3 SSI  Jardiance 25mg daily    Glucose Trends:   Glucose at 2 hrs PPD today resulted at 271 mg/dl  Current monitoring regimen: Dexcom - unable to download readings today  Home blood sugar trends:  (patient recall)   -Fasting AM: 150s, 94, 108, 111   -Before lunch/dinner: mid to low 100s   -Bedtime: 228, 180-190  Any episodes of hypoglycemia?  yes - 2-3x/week   -Treats with glucose gel    Lifestyle Factors:   Previous visit with dietician: yes - 21  Current diet: High calorie, high protein due to lifting            B: banana, oatmeal, or sausage, egg, & cheese            L: chinese, burger jacky, eloonalds                       D: variable; sometimes spaghetti; couple of steak, cheese, & rice chimichangas                       Snacks: grapes ocassionally, baked potato                       Beverages: SHANNAN liu farhana Mercer,   Current exercise: very active at home; [employer] - 2 days/week; plans to increase    Health Maintenance:  Eye exam current (within one year): no  - Dr. Rosalie Tamayo 2019  Any history of foot problems? yes - Right AKA; left foot requires surgery  Last foot exam: 9/20/21  Immunizations up to date: No - due for flu vaccination  Taking ASA:  Yes   Taking statin: atorvastatin   Last urine microalbumin: 1.21 (10/10/18); repeat ordered  Taking ACE/ARB: lisinopril     Focus:   Diabetes Education. Today's A1C decreased from 9.6% to 7.0%; provided much encouragement to [facility] for this improvement - he is very pleased with this progress. We were unable to download Jamie's Dexcom today; however reported SMBG readings are much improved. Given significant improvement in A1c, will reach out to Dr. Dick Stephens. Chapito Morales regarding possibility of scheduling foot surgery. Jamie's diet has been high calorie & high protein; however, he has been increasing the intensity and frequency of his work-outs to match this. [facility] requests a PT referral from Dr. Chapito Morales for leg & arm strength prior to obtaining prosthetic leg. [facility] has an Omnipod pump; however, he has not started it yet. Scheduled pump training for next week. We spent a significant amount of time discussing carbohydrate counting and [facility] was encouraged to practice this before his pump training next week. Offered follow-up with dietician to discuss high calorie diet & carb counting- [facility] will consider. Regarding BP, [facility] did not take BP medication this AM; encouraged [facility] to be consistent with taking his BP.    DSME PLAN:   Discussed general issues about diabetes pathophysiology and management. Counseling at today's visit: carbohydrate counting. Encouraged aerobic exercise. Reminded to get yearly retinal exam.     1. Pharmacist to reach out to Dr. Chapito Morales about your A1c results!!  2. Touch base with Dr. Rosalie Tamayo about your eye exam  3. Practice Carb Counting; visit: [email or web address].[email or web address]/us/en/    4.  Pump Training 10/13 from 9a-11a   -Things to bring:    -A couple omnipods    -Insulin pens - U-200 Humalog -Instruction manual    -Charged PDM   -Don't take Lantus the night before or morning of your appointment      Meter download, medications, PMH and nursing assessment reviewed. Gavino Carney states He is willing to participate in this plan of care and verbalized understanding of all instructions provided. Teach back used to verify comprehension. Follow-up: 1 week for pump training, 2 months with DM clinic RN    Total time involved in direct patient education: 60 minutes. For [address] in place:   Yes   Time Spent (min): Blank ([address]) Zenon Lewis, PharmD, [facility]  Internal Medicine Clinical Pharmacist  [phone]

## 2021-10-13 ENCOUNTER — OFFICE VISIT (OUTPATIENT)
Dept: INTERNAL MEDICINE CLINIC | Age: 51
End: 2021-10-13

## 2021-10-13 VITALS — TEMPERATURE: 97 F | DIASTOLIC BLOOD PRESSURE: 80 MMHG | SYSTOLIC BLOOD PRESSURE: 156 MMHG | HEART RATE: 99 BPM

## 2021-10-13 VITALS — TEMPERATURE: 98.1 F

## 2021-10-13 DIAGNOSIS — Z79.4 TYPE 2 DIABETES MELLITUS WITH HYPERGLYCEMIA, WITH LONG-TERM CURRENT USE OF INSULIN (HCC): Primary | ICD-10-CM

## 2021-10-13 DIAGNOSIS — E11.65 TYPE 2 DIABETES MELLITUS WITH HYPERGLYCEMIA, WITH LONG-TERM CURRENT USE OF INSULIN (HCC): Primary | ICD-10-CM

## 2021-10-13 DIAGNOSIS — R53.1 LEFT-SIDED WEAKNESS: ICD-10-CM

## 2021-10-13 NOTE — PATIENT INSTRUCTIONS
Call Joanne Aleman in 24 hours  [phone]    Bolus for all food eaten and add your blood sugar       --look up the carb amounts of what you are eating

## 2021-10-13 NOTE — PROGRESS NOTES
Woody Obrien present for pump new instructions on OmniPod insulin pump. Last dose of 10/12/21 in AM- [place]. Blood sugar today 228 at Fasting. Pump instructions completed per OmniPod checklist and scanned in to [facility] chart. Instructed to check blood sugars 4+ times per day and to call blood sugars to [facility] in 24 hours . Follow up appointment with Dr. Dora Wallace in 1 week(s) for follow up. Janaedonta Zeferino states He is willing to participate in this plan of care and verbalized understanding of all instructions provided. Teach back used to verify comprehension. Total time involved in direct patient education: 60 minutes. PUMP SETTINGS:            Basal rate: 0000 is 2.1 units/h            Carbohydrate ratio: 0000 is 5 grams/unit                            Insulin sensitivity: 0000 is 15 mg/dL per unit      Blood sugar goal: 100- 140 mg      Active insulin: 3 hrs                         Set up [employer] and [employer] for communication of pump downloads between visits.

## 2021-10-14 ENCOUNTER — TELEPHONE (OUTPATIENT)
Dept: INTERNAL MEDICINE CLINIC | Age: 51
End: 2021-10-14

## 2021-10-14 NOTE — TELEPHONE ENCOUNTER
Per Carley Cruz RN, CDE's request, called patient and left a message asking for a return call to let us know how his blood sugars are doing and how the pump is working for him.

## 2021-10-18 ENCOUNTER — OFFICE VISIT (OUTPATIENT)
Dept: INTERNAL MEDICINE CLINIC | Age: 51
End: 2021-10-18
Payer: MEDICARE

## 2021-10-18 ENCOUNTER — TELEPHONE (OUTPATIENT)
Dept: INTERNAL MEDICINE CLINIC | Age: 51
End: 2021-10-18

## 2021-10-18 VITALS — HEART RATE: 86 BPM | TEMPERATURE: 97.4 F | DIASTOLIC BLOOD PRESSURE: 70 MMHG | SYSTOLIC BLOOD PRESSURE: 138 MMHG

## 2021-10-18 DIAGNOSIS — Z79.4 UNCONTROLLED TYPE 2 DIABETES MELLITUS WITH HYPERGLYCEMIA, WITH LONG-TERM CURRENT USE OF INSULIN (HCC): Primary | ICD-10-CM

## 2021-10-18 DIAGNOSIS — E78.5 DYSLIPIDEMIA: ICD-10-CM

## 2021-10-18 DIAGNOSIS — E66.01 SEVERE OBESITY (BMI 35.0-35.9 WITH COMORBIDITY) (HCC): ICD-10-CM

## 2021-10-18 DIAGNOSIS — E11.65 UNCONTROLLED TYPE 2 DIABETES MELLITUS WITH HYPERGLYCEMIA, WITH LONG-TERM CURRENT USE OF INSULIN (HCC): Primary | ICD-10-CM

## 2021-10-18 PROCEDURE — G8427 DOCREV CUR MEDS BY ELIG CLIN: HCPCS | Performed by: INTERNAL MEDICINE

## 2021-10-18 PROCEDURE — 1036F TOBACCO NON-USER: CPT | Performed by: INTERNAL MEDICINE

## 2021-10-18 PROCEDURE — G8417 CALC BMI ABV UP PARAM F/U: HCPCS | Performed by: INTERNAL MEDICINE

## 2021-10-18 PROCEDURE — 3051F HG A1C>EQUAL 7.0%<8.0%: CPT | Performed by: INTERNAL MEDICINE

## 2021-10-18 PROCEDURE — 3017F COLORECTAL CA SCREEN DOC REV: CPT | Performed by: INTERNAL MEDICINE

## 2021-10-18 PROCEDURE — 99213 OFFICE O/P EST LOW 20 MIN: CPT | Performed by: INTERNAL MEDICINE

## 2021-10-18 PROCEDURE — 2022F DILAT RTA XM EVC RTNOPTHY: CPT | Performed by: INTERNAL MEDICINE

## 2021-10-18 PROCEDURE — G8484 FLU IMMUNIZE NO ADMIN: HCPCS | Performed by: INTERNAL MEDICINE

## 2021-10-18 NOTE — TELEPHONE ENCOUNTER
----- Message from Woody Vitale, [address] [facility] sent at 10/13/2021  7:22 AM EDT -----  Regarding: PT Referral  Anju Booker requested a PT referral for leg & arm strengthening prior to obtaining prosthetic leg. Is this something you can enter for him?     Thank you,  Woody Vitale

## 2021-10-18 NOTE — PROGRESS NOTES
[address] [facility] Internal Medicine  [facility] [address], [address] [facility]  Dept: [phone]  Dept Fax: [phone]    YOB: 1970    He is an obese WM here for follow up of his  uncontrolled DM-2 now on insulin pump and the recent A1c is down to 7.0   Unfortunately due to hyperglycemia, dr. Thompson Eugene has postponed the LLE surgery . He was educated by our diabetic team, and   I am pleased with his progress, and average sugars on the recent dexcom was noted, 195. No hypoglycemic issues. Once again recently initiated on insulin pump, and has a dexcom. Previous  A1c is 9.6, 10.2, and 10.3  And recent one is 7.0   Carlosankit Sharpe was educated on compliance with diet and meds. He gets around in a wheel chair, and already with amputation of RLE , AKA done July 2016 , done at [address] [place] [address]. No recent chest pain or loss of consciousness, no fever chills or hospitalizations. Denies any seizure activity. No active anginal symptoms. Patient Active Problem List   Diagnosis    Status post below knee amputation of right lower extremity (HCC)    Gait disturbance    Sebaceous cyst    Uncontrolled type 2 diabetes mellitus with hyperglycemia, with long-term current use of insulin (HCC)    Severe bipolar II disorder, recent episode major depressive, remission ([facility] [address]    Bipolar 1 disorder (HCC)    Leukocytosis    Lactic acidosis    Hyponatremia    Normocytic anemia    Obesity (BMI 30-39. 9)    History of noncompliance with medical treatment    Essential hypertension    Tobacco dependence    Gastroesophageal reflux disease without esophagitis    History of marijuana use    Physical debility    Anemia    Electrolyte imbalance    Type 2 diabetes mellitus with hyperglycemia, with long-term current use of insulin (HCC)    Weakness    Infection of above knee amputation stump of right leg (HCC)    Above knee amputation of right lower extremity (HCC)    Sepsis (HCC)    Vitamin D deficiency    COPD exacerbation (HCC)    Influenza B    Depression, recurrent ([facility] [address]    Major depressive disorder, recurrent (HCC)    GI bleed    Elevated lipase    Other psychoactive substance abuse, uncomplicated (HCC)    Calculus of gallbladder without cholecystitis without obstruction    Right upper quadrant abdominal pain    Pedal edema    MURALI (obstructive sleep apnea)    Shortness of breath    Hypothyroid    Anxiety and depression    Dyspnea    Sinus tachycardia    Metabolic acidosis    Edema of left lower extremity    SOB (shortness of breath) on exertion    Intermittent palpitations    History of chest pain    Dizziness, nonspecific    Hyperlipidemia LDL goal <70    Neuropathy       Current Outpatient Medications   Medication Sig Dispense Refill    Calcium-Phosphorus-Vitamin D (CALCIUM/D3 ADULT GUMMIES PO) Take 2 tablets by mouth daily      atorvastatin (LIPITOR) 40 MG tablet Take 1 tablet by mouth nightly 30 tablet 3    ARIPiprazole (ABILIFY) 5 MG tablet Take 5 mg by mouth daily       Insulin Disposable Pump (OMNIPOD DASH 5 PACK PODS) MISC CHANGE PODS EVERY 48 HOURS AS DIRECTED (Patient not taking: Reported on 9/20/2021)      insulin lispro (HUMALOG KWIKPEN U-200) 200 UNIT/ML SOPN pen 40 units Plus scale - Max dose 160 units per day (Patient taking differently: Per insulin device) 30 pen 2    nitroGLYCERIN (NITROSTAT) 0.3 MG SL tablet Place 1 tablet under the tongue every 5 minutes as needed for Chest pain up to max of 3 total doses. If no relief after 1 dose, call 911. 30 tablet 3    empagliflozin (JARDIANCE) 25 MG tablet Take 1 tablet by mouth daily New dose 30 tablet 5    gabapentin (NEURONTIN) 300 MG capsule TAKE 1 CAPSULE BY MOUTH THREE TIMES DAILY.  90 capsule 2    sertraline (ZOLOFT) 50 MG tablet Take 50 mg by mouth daily      Multiple Vitamins-Minerals (THERAPEUTIC MULTIVITAMIN-MINERALS) tablet Take 1 tablet by mouth daily      aspirin 81 MG chewable tablet Take 1 tablet by mouth daily 30 tablet 3    fenofibrate (TRICOR) 145 MG tablet Take 1 tablet by mouth daily 30 tablet 3    levothyroxine (SYNTHROID) 50 MCG tablet Take 1 tablet by mouth daily 90 tablet 1    metoprolol tartrate (LOPRESSOR) 50 MG tablet Take 1 tablet by mouth 2 times daily 90 tablet 3    Insulin Glargine, 2 Unit Dial, 300 UNIT/ML SOPN Change to 75 units BID and every 10 days increase by 5 units both times, to get am sugars 150-200 (Patient taking differently: Change to 80 units BID and every 10 days increase by 5 units both times, to get am sugars 150-200) 15 pen 2    Insulin Pen Needle (PEN NEEDLES) 31G X 8 MM MISC For use with insulin injections five timed per day dx:E11.9 150 each 5    Continuous Blood Gluc Transmit (DEXCOM G6 TRANSMITTER) MISC 1 Device by Does not apply route every 3 months 1 each 3    Continuous Blood Gluc Sensor (DEXCOM G6 SENSOR) MISC 1 Device by Does not apply route every 14 days 9 each 3    Continuous Blood Gluc  (DEXCOM G6 ) LIANNE 1 Device by Does not apply route 4 times daily (before meals and nightly) 1 Device 0    lisinopril (PRINIVIL;ZESTRIL) 5 MG tablet Take 1 tablet by mouth daily 90 tablet 1     No current facility-administered medications for this visit. Allergies   Allergen Reactions    Pcn [Penicillins] Shortness Of Breath, Nausea And Vomiting and Other (See Comments)     Does not remember reactions. Has TOLERATED amoxicillin and several different cephalosporins.  Actos [Pioglitazone] Swelling    Clindamycin/Lincomycin Itching    Vancomycin Hives      Rash, with erythematous plaques to abdomen, shoulders, and proximal upper extremities with pruritis, persisted with 2nd dose administered slower.       Metformin And Related Swelling       Review of Systems     As outlined above    /70 (Site: Left Upper Arm)   Pulse 86   Temp 97.4 °F (36.3 °C)       Physical Exam:    General appearance - alert, well appearing, and in no distress and normal appearing weight  Mental status - alert and oriented    Neck - supple, no significant adenopathy  Chest - clear to auscultation, no wheezes, rales or rhonchi, symmetric air entry  Heart - normal rate, regular rhythm, normal S1, S2, no murmurs, rubs, clicks or gallops  Abdomen - soft, nontender, nondistended, no masses or organomegaly  no abdominal bruits. Non-obese Protuberant: No  Neurological - alert, oriented, normal speech, no focal findings or movement disorder noted, motor and sensory grossly normal bilaterally  Musculoskeletal - no joint tenderness,   Extremities - peripheral pulses normal, no pedal edema, no clubbing or cyanosis, RLE , AKA   Prosthesis: No  Skin - normal coloration and turgor, no rashes, no suspicious skin lesions noted        I have reviewed recent diagnostic testing including labs done at the hospital      Lab Results   Component Value Date     08/24/2021    K 4.4 08/24/2021    K 4.2 07/28/2021     08/24/2021    CO2 23 08/24/2021    BUN 16 08/24/2021    CREATININE 0.7 08/24/2021    GLUCOSE 149 08/24/2021    GLUCOSE 114 12/26/2020    CALCIUM 9.6 08/24/2021        Diagnosis Orders   1. Uncontrolled type 2 diabetes mellitus with hyperglycemia, with long-term current use of insulin (HCC)  Basic Metabolic Panel    Hemoglobin A1C    Lipid Panel    Hepatic Function Panel   2. Dyslipidemia  Lipid Panel    Hepatic Function Panel   3.  Severe obesity (BMI 35.0-35.9 with comorbidity) ([facility] [address]           Plan:    Orders Placed This Encounter   Procedures    Basic Metabolic Panel     Check 8-79 days pre office visit     Standing Status:   Future     Standing Expiration Date:   10/18/2022    Hemoglobin A1C     Check 7-10 days pre office visit     Standing Status:   Future     Standing Expiration Date:   10/18/2022    Lipid Panel     Check 7-10 days pre office visit     Standing Status:   Future     Standing Expiration Date:   10/18/2022     Order Specific Question:   Is Patient Fasting?/# of Hours     Answer:   yes     Comments:   12 hours    Hepatic Function Panel     Check 7-10 days pre office visit     Standing Status:   Future     Standing Expiration Date:   10/18/2022       Outpatient Encounter Medications as of 10/18/2021   Medication Sig Dispense Refill    Calcium-Phosphorus-Vitamin D (CALCIUM/D3 ADULT GUMMIES PO) Take 2 tablets by mouth daily      atorvastatin (LIPITOR) 40 MG tablet Take 1 tablet by mouth nightly 30 tablet 3    ARIPiprazole (ABILIFY) 5 MG tablet Take 5 mg by mouth daily       Insulin Disposable Pump (OMNIPOD DASH 5 PACK PODS) MISC CHANGE PODS EVERY 48 HOURS AS DIRECTED (Patient not taking: Reported on 9/20/2021)      insulin lispro (HUMALOG KWIKPEN U-200) 200 UNIT/ML SOPN pen 40 units Plus scale - Max dose 160 units per day (Patient taking differently: Per insulin device) 30 pen 2    nitroGLYCERIN (NITROSTAT) 0.3 MG SL tablet Place 1 tablet under the tongue every 5 minutes as needed for Chest pain up to max of 3 total doses. If no relief after 1 dose, call 911. 30 tablet 3    empagliflozin (JARDIANCE) 25 MG tablet Take 1 tablet by mouth daily New dose 30 tablet 5    gabapentin (NEURONTIN) 300 MG capsule TAKE 1 CAPSULE BY MOUTH THREE TIMES DAILY.  90 capsule 2    sertraline (ZOLOFT) 50 MG tablet Take 50 mg by mouth daily      Multiple Vitamins-Minerals (THERAPEUTIC MULTIVITAMIN-MINERALS) tablet Take 1 tablet by mouth daily      aspirin 81 MG chewable tablet Take 1 tablet by mouth daily 30 tablet 3    fenofibrate (TRICOR) 145 MG tablet Take 1 tablet by mouth daily 30 tablet 3    levothyroxine (SYNTHROID) 50 MCG tablet Take 1 tablet by mouth daily 90 tablet 1    metoprolol tartrate (LOPRESSOR) 50 MG tablet Take 1 tablet by mouth 2 times daily 90 tablet 3    Insulin Glargine, 2 Unit Dial, 300 UNIT/ML SOPN Change to 75 units BID and every 10 days increase by 5 units both times, to get am sugars 150-200 (Patient taking differently: Change to 80 units BID and every 10 days increase by 5 units both times, to get am sugars 150-200) 15 pen 2    Insulin Pen Needle (PEN NEEDLES) 31G X 8 MM MISC For use with insulin injections five timed per day dx:E11.9 150 each 5    Continuous Blood Gluc Transmit (DEXCOM G6 TRANSMITTER) MISC 1 Device by Does not apply route every 3 months 1 each 3    Continuous Blood Gluc Sensor (DEXCOM G6 SENSOR) MISC 1 Device by Does not apply route every 14 days 9 each 3    Continuous Blood Gluc  (DEXCOM G6 ) LIANNE 1 Device by Does not apply route 4 times daily (before meals and nightly) 1 Device 0    lisinopril (PRINIVIL;ZESTRIL) 5 MG tablet Take 1 tablet by mouth daily 90 tablet 1     No facility-administered encounter medications on file as of 10/18/2021. Diagnosis Orders   1. Uncontrolled type 2 diabetes mellitus with hyperglycemia, with long-term current use of insulin (HCC)  Basic Metabolic Panel    Hemoglobin A1C    Lipid Panel    Hepatic Function Panel   2. Dyslipidemia  Lipid Panel    Hepatic Function Panel   3. Severe obesity (BMI 35.0-35.9 with comorbidity) ([facility] [address]         Controlled Substances Monitoring:    Risk score of 250     Plan:    Overall the sugars are much better, pleased with his A1c of 7.0 which is a big change. He is encouraged to continue with his Dexcom and insulin pump is closely following up with our diabetic team for support. Is encouraged to be careful with his diet. Also informed Dr. Susan Robles  as he needs foot surgery. Previously postponed secondary to uncontrolled sugars now that is greatly improved with the current insulin pump on dietary and lifestyle changes. He would need labs and EKG within 30 days of surgery . , if those are acceptable can proceed with surgery , and this was d/w dr. Susan Robles on the phone today.        Lab Results   Component Value Date     08/24/2021    K 4.4 08/24/2021    K 4.2 07/28/2021     08/24/2021    CO2 23 08/24/2021    BUN 16 08/24/2021    CREATININE

## 2021-10-25 ENCOUNTER — HOSPITAL ENCOUNTER (OUTPATIENT)
Dept: PHYSICAL THERAPY | Age: 51
Setting detail: THERAPIES SERIES
Discharge: HOME | End: 2021-10-25
Payer: MEDICARE

## 2021-10-25 ENCOUNTER — TELEPHONE (OUTPATIENT)
Dept: INTERNAL MEDICINE CLINIC | Age: 51
End: 2021-10-25

## 2021-10-25 DIAGNOSIS — I10 ESSENTIAL HYPERTENSION: Primary | ICD-10-CM

## 2021-10-25 PROCEDURE — 97161 PT EVAL LOW COMPLEX 20 MIN: CPT

## 2021-10-25 PROCEDURE — 97110 THERAPEUTIC EXERCISES: CPT

## 2021-10-25 NOTE — PROGRESS NOTES
** PLEASE SIGN, DATE AND TIME CERTIFICATION BELOW AND RETURN TO [facility] OUTPATIENT REHABILITATION (FAX #: [phone]). ATTEST/CO-SIGN IF ACCESSING VIA [employer] THANK YOU.**    I certify that I have examined the patient below and determined that Physical Medicine and Rehabilitation service is necessary and that I approve the established plan of care for up to 90 days or as specifically noted.   Attestation, signature or co-signature of physician indicates approval of certification requirements.    ________________________ ____________ __________  Physician Signature   Date   Time  [address] [facility]  PHYSICAL THERAPY  [x] EVALUATION  [] DAILY NOTE (LAND) [] DAILY NOTE (AQUATIC ) [] PROGRESS NOTE [] DISCHARGE NOTE    [x] [facility]   [] [place] [address]    [] [facility]   [] Sushma Crimes    Date: 10/25/2021  Patient Name:  Gavino Carney  : 1970  MRN: [id number]  CSN: [id number]    Referring Practitioner Natalie Aiken MD   Diagnosis Weakness [R53.1]    Treatment Diagnosis Generalized weakness, bilateral hand pain    Date of Evaluation 10/25/21    Additional Pertinent History Diabetic peripheral neuropathy, hypertension, bipolar 2, Right AKA 19, MRSA      Functional Outcome Measure Used LEFS   Functional Outcome Score 15/80 (10/25/21)       Insurance: Primary: Payor: Manuel Mckinley /  /  / ,   Secondary: MEDICAID [place]   Authorization Information: N   Visit # 1, 1/10 for progress note   Visits Allowed: OUTPATIENT BENEFITS:               DEDUCTIBLE: $NA                             OUT OF POCKET: $NA              INSURANCE PAYS AT:  80%              PATIENT RESPONSIBILITY AND/OR CO-PAY:  20%  SECONDARY INSURANCE COMPANY: MEDICAID      PRECERTIFICATION REQUIRED:  NO  INSURANCE THERAPY BENEFIT:  UNLIMITED VISITS BASED ON MEDICAL NECESSITY  AQUATIC THERAPY COVERED: YES  MODALITIES COVERED: YES   Recertification Date: 8/3/15 Physician Follow-Up: 1/18/21   Physician Orders:    History of Present Illness: Pt has hx of BKA in 2016, AKA in 2019. Pt notes that on 10/28/21 he is having surgery on his left calcified achilles tendon. Pt uncertain on the exact procedure being performed, notes surgeon intends him to be WBAT within CAM boot after surgery. Pt notes that he typically scoots in his wheel chair. Pt notes that he does have a prosthetic in 2019, does not use, would like to start using it. Pt notes that he saw Prosthetist earlier this year, would like for him to be healed from Left ankle sx. Prior to starting to work with prosthetic again. SUBJECTIVE: Pt notes currently having bilat hand pain, right > left, did have Carpal tunnel surgery earlier this year with no lasting relief per Pt. Social/Functional History and Current Status:  Medications and Allergies have been reviewed and are listed on Medical History Questionnaire. Sree Ponce lives alone in a multiple floor home with ability to complete ADL's on main floor with a ramp to enter.     Task Previous Current   ADLs  Independent Independent   IADL's Independent Independent   Ambulation Dependent/Unable Dependent/Unable Scoots within wheelchair    Transfers Independent Independent stand pivot independently w/o AD    Recreation Independent Independent resistance bands    Community Integration     Driving Does not drive Does not drive   Work On Disability  On Disability     OBJECTIVE:    Pain: 10/10 bilat hands    Palpation    Observation    Posture        Range of Motion    Strength    Coordination    Sensation    Bed Mobility Independent, increased time    Transfers Mod , assist with w/c    Ambulation    Stairs Unable    Balance    Special Tests            LOWER EXTREMITY    ROM ROM Strength Strength     Left Right Left Right COMMENTS   Hip Flexion 4/5 5-/5      Hip Extension 4/5 5-/5      Hip ABDuction 4-/5 4/5      Hip ADDuction        Hip Internal Rotation Hip External Rotation 5-/5       [facility]rength [facility] []  Hip Range of Motion is [facility]  []      Knee Flexion 5-/5 x      Knee Extension 5-/5 x      Knee Strength is [facility] []  Knee Range of Motion is [facility]  []      Ankle Plantarflexion 2/5 x      Ankle Dorsiflexion 3+/5 x      Ankle Inversion        Ankle Eversion        Ankle Strength is [facility] []  Ankle Range of Motion is [facility]  []     UE Strength:    Shoulder Abd:   Left:  4/5  Right: 4+/5  Shoulder Flex:   Left: 4-/5    Right: 4+/5   Shoulder ER:  Left: 4+/5   Right: 4+/5  Shoulder IR:   Left:  5/5  Right: 5/5    Elbow Flex:   Left: 5-/5   Right: 5-/5  Elbow Ext:   Left:  5-/5  Right: 5-/5        TREATMENT   Precautions: Right AKA 2019, Left ankle sx. Scheduled for 10/28/21. Pain: 10/10     X in shaded column indicates activity completed today   Modalities Parameters/  Location  Notes                     Manual Therapy Time/Technique  Notes   Right hip extension PROM                   Exercise/Intervention   Notes   Sitting:        LAQ 10x2s 2# 2 sets  x    HS Curl  2x10 orange   x    Alt marching               Side Lying:       Hip abduction  6 ea  x    Hip ER (Left)  10  x    Right Hip flexion / extension  10  x           Standing:       Standing within walker        Sit to stands within // bars               t-band resisted tricep extension , shoulder extension , row                 Specific Interventions Next Treatment: Review new precautions/restrictions with sx. On 10/28/21. LE/UE strengthening to aid in future use of prosthesis. Pt requests to avoid supine position secondary to pain. Activity/Treatment Tolerance:  [x]  Patient tolerated treatment well  []  Patient limited by fatigue  []  Patient limited by pain   []  Patient limited by medical complications  []  Other:     Assessment: Pt presents to clinic right AKA, with generalized weakness especially throughout LLE.  Pt to benefit from skilled PT to improve strength to allow for improved mobility and future use of prosthesis for ambulation. Pt is motivated to improve strength and eventually ambulate with prosthesis. Body Structures/Functions/Activity Limitations: impaired activity tolerance, impaired endurance, impaired ROM, impaired strength and pain  Prognosis: good    GOALS:  Patient Goal: Improve LLE strength and BUEs to allow for ambulation once healed from Left ankle procedure. Short Term Goals:  Time Frame: 4 weeks    Pt to report decreased pain levels to 8/10 to aid in improved activity tolerance. Long Term Goals:  Time Frame: 10 weeks    Pt to improve LEFS score from 15/80 to >= 25/80 to indicate improved mobility and decreased functional limitations. Pt to demo Left strength of >= 5-/5 to aid in future ambulation with prosthesis. Pt to demo Left ankle strength of >- 4-/5 to aid in future ambulation with prosthesis. Pt to compliant and independent with HEP. Patient Education:   [x]  HEP/Education Completed: Plan of Care, Goals, HEP, body mechanics, positioning.  [employer] Access Code: [email or web address]  []  No new Education completed  []  Reviewed Prior HEP      [x]  Patient verbalized and/or demonstrated understanding of education provided. []  Patient unable to verbalize and/or demonstrate understanding of education provided. Will continue education. []  Barriers to learning: None    PLAN:  Treatment Recommendations: Strengthening, Range of Motion, Balance Training, Functional Mobility Training, Transfer Training, Endurance Training, Manual Therapy - Joint Manipulation, Pain Management, Home Exercise Program and Patient Education    [x]  Plan of care initiated. Plan to see patient 2 times per week for 10 weeks to address the treatment planned outlined above.   []  Continue with current plan of care  []  Modify plan of care as follows:    []  Hold pending physician visit  []  Discharge    Time In 0912   Time Out 1000   Timed Code Minutes: 10 min   Total Treatment Time: 48 min Electronically Signed by: Faustino Evans PT

## 2021-10-25 NOTE — TELEPHONE ENCOUNTER
Yeimi Jones, Dr. Fanny Cuellar nurse, called. Pt scheduled for surgery 11/12/21. They want to know if you are needed to see the patient again in office before ok for surgery or if we can just fax over a letter for clearance? . Pt was waiting on A1C to lower. As of 10/18/21 A1C was 7.0. Dr. Rosa Solorzano nurse just now getting back with me on this.

## 2021-10-27 DIAGNOSIS — Z79.4 TYPE 2 DIABETES MELLITUS WITH HYPERGLYCEMIA, WITH LONG-TERM CURRENT USE OF INSULIN (HCC): Primary | ICD-10-CM

## 2021-10-27 DIAGNOSIS — E11.65 TYPE 2 DIABETES MELLITUS WITH HYPERGLYCEMIA, WITH LONG-TERM CURRENT USE OF INSULIN (HCC): Primary | ICD-10-CM

## 2021-10-27 DIAGNOSIS — Z01.818 PRE-OP EVALUATION: ICD-10-CM

## 2021-10-27 NOTE — TELEPHONE ENCOUNTER
Needs fasting  labs soon and EKG , he can come to our office for EKG. These needed within in 30 days of surgery, so check on them soon. I ordered the labs.        Electronically signed by Madhu Garcia MD on 10/27/2021 at 2:16 PM

## 2021-10-27 NOTE — TELEPHONE ENCOUNTER
PT called back. Ordered EKG for pt to have done at [facility] per pt preference. Pt will get labs and EKG done in the next few days.

## 2021-10-29 ENCOUNTER — HOSPITAL ENCOUNTER (OUTPATIENT)
Age: 51
Discharge: HOME | End: 2021-10-29
Payer: MEDICARE

## 2021-10-29 DIAGNOSIS — Z01.818 PRE-OP EVALUATION: ICD-10-CM

## 2021-10-29 DIAGNOSIS — E11.65 TYPE 2 DIABETES MELLITUS WITH HYPERGLYCEMIA, WITH LONG-TERM CURRENT USE OF INSULIN (HCC): ICD-10-CM

## 2021-10-29 DIAGNOSIS — E78.5 DYSLIPIDEMIA: ICD-10-CM

## 2021-10-29 DIAGNOSIS — E11.65 UNCONTROLLED TYPE 2 DIABETES MELLITUS WITH HYPERGLYCEMIA, WITH LONG-TERM CURRENT USE OF INSULIN (HCC): ICD-10-CM

## 2021-10-29 DIAGNOSIS — Z79.4 TYPE 2 DIABETES MELLITUS WITH HYPERGLYCEMIA, WITH LONG-TERM CURRENT USE OF INSULIN (HCC): ICD-10-CM

## 2021-10-29 DIAGNOSIS — I10 ESSENTIAL HYPERTENSION: ICD-10-CM

## 2021-10-29 DIAGNOSIS — Z79.4 UNCONTROLLED TYPE 2 DIABETES MELLITUS WITH HYPERGLYCEMIA, WITH LONG-TERM CURRENT USE OF INSULIN (HCC): ICD-10-CM

## 2021-10-29 LAB
ALBUMIN SERPL-MCNC: 4.2 G/DL (ref 3.5–5.1)
ALP BLD-CCNC: 70 U/L (ref 38–126)
ALT SERPL-CCNC: 17 U/L (ref 11–66)
ANION GAP SERPL CALCULATED.3IONS-SCNC: 13 MEQ/L (ref 8–16)
AST SERPL-CCNC: 23 U/L (ref 5–40)
AVERAGE GLUCOSE: 192 MG/DL (ref 70–126)
BASOPHILS # BLD: 0.5 %
BASOPHILS ABSOLUTE: 0.1 THOU/MM3 (ref 0–0.1)
BILIRUB SERPL-MCNC: 0.4 MG/DL (ref 0.3–1.2)
BILIRUBIN DIRECT: < 0.2 MG/DL (ref 0–0.3)
BUN BLDV-MCNC: 20 MG/DL (ref 7–22)
CALCIUM SERPL-MCNC: 9.9 MG/DL (ref 8.5–10.5)
CHLORIDE BLD-SCNC: 99 MEQ/L (ref 98–111)
CHOLESTEROL, TOTAL: 167 MG/DL (ref 100–199)
CO2: 25 MEQ/L (ref 23–33)
CREAT SERPL-MCNC: 0.9 MG/DL (ref 0.4–1.2)
EOSINOPHIL # BLD: 2.1 %
EOSINOPHILS ABSOLUTE: 0.2 THOU/MM3 (ref 0–0.4)
ERYTHROCYTE [DISTWIDTH] IN BLOOD BY AUTOMATED COUNT: 13.8 % (ref 11.5–14.5)
ERYTHROCYTE [DISTWIDTH] IN BLOOD BY AUTOMATED COUNT: 44.7 FL (ref 35–45)
GFR SERPL CREATININE-BSD FRML MDRD: 88 ML/MIN/1.73M2
GLUCOSE BLD-MCNC: 112 MG/DL (ref 70–108)
HBA1C MFR BLD: 8.4 % (ref 4.4–6.4)
HCT VFR BLD CALC: 45 % (ref 42–52)
HDLC SERPL-MCNC: 37 MG/DL
HEMOGLOBIN: 15.9 GM/DL (ref 14–18)
IMMATURE GRANS (ABS): 0.06 THOU/MM3 (ref 0–0.07)
IMMATURE GRANULOCYTES: 0.5 %
LDL CHOLESTEROL CALCULATED: 92 MG/DL
LYMPHOCYTES # BLD: 20 %
LYMPHOCYTES ABSOLUTE: 2.3 THOU/MM3 (ref 1–4.8)
MCH RBC QN AUTO: 31.4 PG (ref 26–33)
MCHC RBC AUTO-ENTMCNC: 35.3 GM/DL (ref 32.2–35.5)
MCV RBC AUTO: 88.8 FL (ref 80–94)
MONOCYTES # BLD: 7.8 %
MONOCYTES ABSOLUTE: 0.9 THOU/MM3 (ref 0.4–1.3)
NUCLEATED RED BLOOD CELLS: 0 /100 WBC
PLATELET # BLD: 257 THOU/MM3 (ref 130–400)
PMV BLD AUTO: 9 FL (ref 9.4–12.4)
POTASSIUM SERPL-SCNC: 4.2 MEQ/L (ref 3.5–5.2)
RBC # BLD: 5.07 MILL/MM3 (ref 4.7–6.1)
SEG NEUTROPHILS: 69.1 %
SEGMENTED NEUTROPHILS ABSOLUTE COUNT: 7.9 THOU/MM3 (ref 1.8–7.7)
SODIUM BLD-SCNC: 137 MEQ/L (ref 135–145)
TOTAL PROTEIN: 7.6 G/DL (ref 6.1–8)
TRIGL SERPL-MCNC: 192 MG/DL (ref 0–199)
WBC # BLD: 11.4 THOU/MM3 (ref 4.8–10.8)

## 2021-10-29 PROCEDURE — 36415 COLL VENOUS BLD VENIPUNCTURE: CPT

## 2021-10-29 PROCEDURE — 80053 COMPREHEN METABOLIC PANEL: CPT

## 2021-10-29 PROCEDURE — 85025 COMPLETE CBC W/AUTO DIFF WBC: CPT

## 2021-10-29 PROCEDURE — 93005 ELECTROCARDIOGRAM TRACING: CPT

## 2021-10-29 PROCEDURE — 83036 HEMOGLOBIN GLYCOSYLATED A1C: CPT

## 2021-10-29 PROCEDURE — 82248 BILIRUBIN DIRECT: CPT

## 2021-10-29 PROCEDURE — 80061 LIPID PANEL: CPT

## 2021-10-30 LAB
EKG Q-T INTERVAL: 342 MS
EKG QRS DURATION: 74 MS
EKG QTC CALCULATION (BAZETT): 441 MS
EKG R AXIS: -24 DEGREES
EKG T AXIS: -93 DEGREES
EKG VENTRICULAR RATE: 100 BPM

## 2021-11-02 ENCOUNTER — TELEPHONE (OUTPATIENT)
Dept: INTERNAL MEDICINE CLINIC | Age: 51
End: 2021-11-02

## 2021-11-02 DIAGNOSIS — E11.65 UNCONTROLLED TYPE 2 DIABETES MELLITUS WITH HYPERGLYCEMIA, WITH LONG-TERM CURRENT USE OF INSULIN (HCC): ICD-10-CM

## 2021-11-02 DIAGNOSIS — Z79.4 UNCONTROLLED TYPE 2 DIABETES MELLITUS WITH HYPERGLYCEMIA, WITH LONG-TERM CURRENT USE OF INSULIN (HCC): ICD-10-CM

## 2021-11-02 DIAGNOSIS — G62.9 NEUROPATHY: ICD-10-CM

## 2021-11-02 NOTE — TELEPHONE ENCOUNTER
Left message for patient that Dr. Real Guo wants him to come into the office for an EKG . Please call the office to confirm receiving this message .

## 2021-11-03 RX ORDER — GABAPENTIN 300 MG/1
CAPSULE ORAL
Qty: 90 CAPSULE | Refills: 2 | Status: SHIPPED | OUTPATIENT
Start: 2021-11-03 | End: 2022-01-18 | Stop reason: SDUPTHER

## 2021-11-04 ENCOUNTER — TELEPHONE (OUTPATIENT)
Dept: INTERNAL MEDICINE CLINIC | Age: 51
End: 2021-11-04

## 2021-11-04 ENCOUNTER — NURSE ONLY (OUTPATIENT)
Dept: INTERNAL MEDICINE CLINIC | Age: 51
End: 2021-11-04
Payer: MEDICARE

## 2021-11-04 DIAGNOSIS — Z01.818 PRE-OP EVALUATION: ICD-10-CM

## 2021-11-04 DIAGNOSIS — I10 ESSENTIAL HYPERTENSION: Primary | ICD-10-CM

## 2021-11-04 PROCEDURE — 93000 ELECTROCARDIOGRAM COMPLETE: CPT | Performed by: INTERNAL MEDICINE

## 2021-11-04 NOTE — TELEPHONE ENCOUNTER
Called Rocky Kruse to discuss Omnipod vs. No Omnipod. He has being giving basal bolus injections for the past 1.5 weeks:   Lantus: 80 units BID; Humalog 40 units TID + scale. He feels his blood sugars are well-controlled since switching back from the pump. BG Readings (recall):   -Fasting: today-198, typically 100s  -2 week range:     Based on the above readings, recommend Rocky Kruse continues with basal/bolus insulin, at least until after surgery. Rocky Kruse will keep Omnipods for future use if needed.     For Pharmacy Admin Tracking Only   Time Spent (min): Nick Hardin ([address]) Philip Khan, PharmD, [facility]  Internal Medicine Clinical Pharmacist  [phone]

## 2021-11-04 NOTE — PROGRESS NOTES
EKG & pt. History reviewed per Dr. Karime Parikh. OK to proceed with surgery @ [facility] on 11-.

## 2021-11-04 NOTE — PROGRESS NOTES
Pt here for EKG. Dr. Socorro Reynaga reviewed and says it looks OK. He is clearing him for his upcoming orthopaedic surgery with Dr. Huy Bartholomew. Pt was notified of this.

## 2021-11-09 RX ORDER — PEN NEEDLE, DIABETIC 30 GX3/16"
NEEDLE, DISPOSABLE MISCELLANEOUS
Qty: 150 EACH | Refills: 5 | Status: SHIPPED | OUTPATIENT
Start: 2021-11-09 | End: 2022-01-11 | Stop reason: SDUPTHER

## 2021-11-10 ENCOUNTER — HOSPITAL ENCOUNTER (OUTPATIENT)
Age: 51
Discharge: HOME | End: 2021-11-10
Payer: MEDICARE

## 2021-11-10 LAB
MRSA NASAL SCREEN RT-PCR: NEGATIVE
STAPH AUREUS SCREEN RT-PCR: NEGATIVE

## 2021-11-10 PROCEDURE — 87640 STAPH A DNA AMP PROBE: CPT

## 2021-11-10 PROCEDURE — 87641 MR-STAPH DNA AMP PROBE: CPT

## 2021-11-12 ENCOUNTER — ANESTHESIA EVENT (OUTPATIENT)
Dept: OPERATING ROOM | Age: 51
End: 2021-11-12
Payer: MEDICARE

## 2021-11-12 ENCOUNTER — HOSPITAL ENCOUNTER (OUTPATIENT)
Age: 51
Setting detail: OUTPATIENT SURGERY
Discharge: HOME | End: 2021-11-12
Attending: ORTHOPAEDIC SURGERY | Admitting: ORTHOPAEDIC SURGERY
Payer: MEDICARE

## 2021-11-12 ENCOUNTER — ANESTHESIA (OUTPATIENT)
Dept: OPERATING ROOM | Age: 51
End: 2021-11-12
Payer: MEDICARE

## 2021-11-12 VITALS
SYSTOLIC BLOOD PRESSURE: 143 MMHG | OXYGEN SATURATION: 96 % | WEIGHT: 225 LBS | TEMPERATURE: 98.1 F | BODY MASS INDEX: 36.16 KG/M2 | DIASTOLIC BLOOD PRESSURE: 67 MMHG | HEART RATE: 108 BPM | HEIGHT: 66 IN | RESPIRATION RATE: 15 BRPM

## 2021-11-12 VITALS
SYSTOLIC BLOOD PRESSURE: 137 MMHG | TEMPERATURE: 96.8 F | DIASTOLIC BLOOD PRESSURE: 65 MMHG | OXYGEN SATURATION: 99 % | RESPIRATION RATE: 10 BRPM

## 2021-11-12 LAB — GLUCOSE BLD-MCNC: 139 MG/DL (ref 70–108)

## 2021-11-12 PROCEDURE — 3700000001 HC ADD 15 MINUTES (ANESTHESIA): Performed by: ORTHOPAEDIC SURGERY

## 2021-11-12 PROCEDURE — 7100000000 HC PACU RECOVERY - FIRST 15 MIN: Performed by: ORTHOPAEDIC SURGERY

## 2021-11-12 PROCEDURE — 6360000002 HC RX W HCPCS: Performed by: ORTHOPAEDIC SURGERY

## 2021-11-12 PROCEDURE — 7100000001 HC PACU RECOVERY - ADDTL 15 MIN: Performed by: ORTHOPAEDIC SURGERY

## 2021-11-12 PROCEDURE — 6360000002 HC RX W HCPCS

## 2021-11-12 PROCEDURE — 7100000011 HC PHASE II RECOVERY - ADDTL 15 MIN: Performed by: ORTHOPAEDIC SURGERY

## 2021-11-12 PROCEDURE — 2580000003 HC RX 258: Performed by: ORTHOPAEDIC SURGERY

## 2021-11-12 PROCEDURE — 82948 REAGENT STRIP/BLOOD GLUCOSE: CPT

## 2021-11-12 PROCEDURE — 6360000002 HC RX W HCPCS: Performed by: ANESTHESIOLOGY

## 2021-11-12 PROCEDURE — 7100000010 HC PHASE II RECOVERY - FIRST 15 MIN: Performed by: ORTHOPAEDIC SURGERY

## 2021-11-12 PROCEDURE — 3700000000 HC ANESTHESIA ATTENDED CARE: Performed by: ORTHOPAEDIC SURGERY

## 2021-11-12 PROCEDURE — 2500000003 HC RX 250 WO HCPCS

## 2021-11-12 PROCEDURE — 64445 NJX AA&/STRD SCIATIC NRV IMG: CPT | Performed by: ANESTHESIOLOGY

## 2021-11-12 PROCEDURE — 3600000002 HC SURGERY LEVEL 2 BASE: Performed by: ORTHOPAEDIC SURGERY

## 2021-11-12 PROCEDURE — 3600000012 HC SURGERY LEVEL 2 ADDTL 15MIN: Performed by: ORTHOPAEDIC SURGERY

## 2021-11-12 PROCEDURE — 2580000003 HC RX 258

## 2021-11-12 RX ORDER — EPHEDRINE SULFATE/0.9% NACL/PF 50 MG/5 ML
SYRINGE (ML) INTRAVENOUS PRN
Status: DISCONTINUED | OUTPATIENT
Start: 2021-11-12 | End: 2021-11-12 | Stop reason: SDUPTHER

## 2021-11-12 RX ORDER — PROPOFOL 10 MG/ML
INJECTION, EMULSION INTRAVENOUS PRN
Status: DISCONTINUED | OUTPATIENT
Start: 2021-11-12 | End: 2021-11-12 | Stop reason: SDUPTHER

## 2021-11-12 RX ORDER — ONDANSETRON 2 MG/ML
INJECTION INTRAMUSCULAR; INTRAVENOUS PRN
Status: DISCONTINUED | OUTPATIENT
Start: 2021-11-12 | End: 2021-11-12 | Stop reason: SDUPTHER

## 2021-11-12 RX ORDER — ONDANSETRON 2 MG/ML
4 INJECTION INTRAMUSCULAR; INTRAVENOUS
Status: DISCONTINUED | OUTPATIENT
Start: 2021-11-12 | End: 2021-11-12 | Stop reason: HOSPADM

## 2021-11-12 RX ORDER — GLYCOPYRROLATE 1 MG/5 ML
SYRINGE (ML) INTRAVENOUS PRN
Status: DISCONTINUED | OUTPATIENT
Start: 2021-11-12 | End: 2021-11-12 | Stop reason: SDUPTHER

## 2021-11-12 RX ORDER — FENTANYL CITRATE 50 UG/ML
INJECTION, SOLUTION INTRAMUSCULAR; INTRAVENOUS PRN
Status: DISCONTINUED | OUTPATIENT
Start: 2021-11-12 | End: 2021-11-12 | Stop reason: SDUPTHER

## 2021-11-12 RX ORDER — FENTANYL CITRATE 50 UG/ML
50 INJECTION, SOLUTION INTRAMUSCULAR; INTRAVENOUS EVERY 5 MIN PRN
Status: DISCONTINUED | OUTPATIENT
Start: 2021-11-12 | End: 2021-11-12 | Stop reason: HOSPADM

## 2021-11-12 RX ORDER — LIDOCAINE HYDROCHLORIDE 20 MG/ML
INJECTION, SOLUTION INFILTRATION; PERINEURAL PRN
Status: DISCONTINUED | OUTPATIENT
Start: 2021-11-12 | End: 2021-11-12 | Stop reason: SDUPTHER

## 2021-11-12 RX ORDER — HYDRALAZINE HYDROCHLORIDE 20 MG/ML
5 INJECTION INTRAMUSCULAR; INTRAVENOUS EVERY 10 MIN PRN
Status: DISCONTINUED | OUTPATIENT
Start: 2021-11-12 | End: 2021-11-12 | Stop reason: HOSPADM

## 2021-11-12 RX ORDER — ROCURONIUM BROMIDE 10 MG/ML
INJECTION, SOLUTION INTRAVENOUS PRN
Status: DISCONTINUED | OUTPATIENT
Start: 2021-11-12 | End: 2021-11-12 | Stop reason: SDUPTHER

## 2021-11-12 RX ORDER — FENTANYL CITRATE 50 UG/ML
25 INJECTION, SOLUTION INTRAMUSCULAR; INTRAVENOUS EVERY 5 MIN PRN
Status: DISCONTINUED | OUTPATIENT
Start: 2021-11-12 | End: 2021-11-12 | Stop reason: HOSPADM

## 2021-11-12 RX ORDER — ROPIVACAINE HYDROCHLORIDE 5 MG/ML
INJECTION, SOLUTION EPIDURAL; INFILTRATION; PERINEURAL
Status: COMPLETED | OUTPATIENT
Start: 2021-11-12 | End: 2021-11-12

## 2021-11-12 RX ORDER — DEXAMETHASONE SODIUM PHOSPHATE 4 MG/ML
INJECTION, SOLUTION INTRA-ARTICULAR; INTRALESIONAL; INTRAMUSCULAR; INTRAVENOUS; SOFT TISSUE PRN
Status: DISCONTINUED | OUTPATIENT
Start: 2021-11-12 | End: 2021-11-12 | Stop reason: SDUPTHER

## 2021-11-12 RX ORDER — SODIUM CHLORIDE, SODIUM LACTATE, POTASSIUM CHLORIDE, CALCIUM CHLORIDE 600; 310; 30; 20 MG/100ML; MG/100ML; MG/100ML; MG/100ML
INJECTION, SOLUTION INTRAVENOUS CONTINUOUS
Status: DISCONTINUED | OUTPATIENT
Start: 2021-11-12 | End: 2021-11-12 | Stop reason: HOSPADM

## 2021-11-12 RX ORDER — CEFAZOLIN SODIUM 1 G/3ML
INJECTION, POWDER, FOR SOLUTION INTRAMUSCULAR; INTRAVENOUS PRN
Status: DISCONTINUED | OUTPATIENT
Start: 2021-11-12 | End: 2021-11-12 | Stop reason: SDUPTHER

## 2021-11-12 RX ORDER — SUCCINYLCHOLINE/SOD CL,ISO/PF 200MG/10ML
SYRINGE (ML) INTRAVENOUS PRN
Status: DISCONTINUED | OUTPATIENT
Start: 2021-11-12 | End: 2021-11-12 | Stop reason: SDUPTHER

## 2021-11-12 RX ORDER — MEPERIDINE HYDROCHLORIDE 25 MG/ML
12.5 INJECTION INTRAMUSCULAR; INTRAVENOUS; SUBCUTANEOUS EVERY 5 MIN PRN
Status: DISCONTINUED | OUTPATIENT
Start: 2021-11-12 | End: 2021-11-12 | Stop reason: HOSPADM

## 2021-11-12 RX ORDER — SODIUM CHLORIDE 9 MG/ML
INJECTION, SOLUTION INTRAVENOUS CONTINUOUS PRN
Status: DISCONTINUED | OUTPATIENT
Start: 2021-11-12 | End: 2021-11-12 | Stop reason: SDUPTHER

## 2021-11-12 RX ADMIN — PHENYLEPHRINE HYDROCHLORIDE 200 MCG: 10 INJECTION INTRAVENOUS at 08:30

## 2021-11-12 RX ADMIN — ROCURONIUM BROMIDE 5 MG: 10 INJECTION INTRAVENOUS at 07:56

## 2021-11-12 RX ADMIN — Medication 10 MG: at 08:40

## 2021-11-12 RX ADMIN — PHENYLEPHRINE HYDROCHLORIDE 300 MCG: 10 INJECTION INTRAVENOUS at 08:24

## 2021-11-12 RX ADMIN — Medication 0.1 MG: at 08:20

## 2021-11-12 RX ADMIN — PHENYLEPHRINE HYDROCHLORIDE 200 MCG: 10 INJECTION INTRAVENOUS at 08:08

## 2021-11-12 RX ADMIN — DEXAMETHASONE SODIUM PHOSPHATE 10 MG: 4 INJECTION, SOLUTION INTRAMUSCULAR; INTRAVENOUS at 08:01

## 2021-11-12 RX ADMIN — PHENYLEPHRINE HYDROCHLORIDE 100 MCG: 10 INJECTION INTRAVENOUS at 08:40

## 2021-11-12 RX ADMIN — ONDANSETRON HYDROCHLORIDE 4 MG: 4 INJECTION, SOLUTION INTRAMUSCULAR; INTRAVENOUS at 08:26

## 2021-11-12 RX ADMIN — SODIUM CHLORIDE: 9 INJECTION, SOLUTION INTRAVENOUS at 06:56

## 2021-11-12 RX ADMIN — PHENYLEPHRINE HYDROCHLORIDE 300 MCG: 10 INJECTION INTRAVENOUS at 08:17

## 2021-11-12 RX ADMIN — PROPOFOL 100 MG: 10 INJECTION, EMULSION INTRAVENOUS at 07:56

## 2021-11-12 RX ADMIN — LIDOCAINE HYDROCHLORIDE 100 MG: 20 INJECTION, SOLUTION INFILTRATION; PERINEURAL at 07:56

## 2021-11-12 RX ADMIN — Medication 100 MG: at 07:56

## 2021-11-12 RX ADMIN — FENTANYL CITRATE 50 MCG: 50 INJECTION, SOLUTION INTRAMUSCULAR; INTRAVENOUS at 07:56

## 2021-11-12 RX ADMIN — Medication 10 MG: at 08:30

## 2021-11-12 RX ADMIN — CEFAZOLIN 2000 MG: 1 INJECTION, POWDER, FOR SOLUTION INTRAMUSCULAR; INTRAVENOUS at 08:11

## 2021-11-12 RX ADMIN — ROPIVACAINE HYDROCHLORIDE 25 ML: 5 INJECTION, SOLUTION EPIDURAL; INFILTRATION; PERINEURAL at 07:38

## 2021-11-12 RX ADMIN — PHENYLEPHRINE HYDROCHLORIDE 300 MCG: 10 INJECTION INTRAVENOUS at 08:12

## 2021-11-12 ASSESSMENT — PULMONARY FUNCTION TESTS
PIF_VALUE: 2
PIF_VALUE: 0
PIF_VALUE: 5
PIF_VALUE: 2
PIF_VALUE: 18
PIF_VALUE: 1
PIF_VALUE: 7
PIF_VALUE: 10
PIF_VALUE: 0
PIF_VALUE: 4
PIF_VALUE: 34
PIF_VALUE: 4
PIF_VALUE: 2
PIF_VALUE: 20
PIF_VALUE: 24
PIF_VALUE: 1
PIF_VALUE: 1
PIF_VALUE: 2
PIF_VALUE: 16
PIF_VALUE: 16
PIF_VALUE: 5
PIF_VALUE: 3
PIF_VALUE: 6
PIF_VALUE: 4
PIF_VALUE: 1
PIF_VALUE: 3
PIF_VALUE: 2
PIF_VALUE: 2
PIF_VALUE: 5
PIF_VALUE: 2
PIF_VALUE: 5
PIF_VALUE: 4
PIF_VALUE: 2
PIF_VALUE: 9
PIF_VALUE: 2
PIF_VALUE: 2
PIF_VALUE: 16
PIF_VALUE: 5
PIF_VALUE: 30
PIF_VALUE: 6
PIF_VALUE: 16
PIF_VALUE: 25
PIF_VALUE: 4
PIF_VALUE: 6
PIF_VALUE: 8
PIF_VALUE: 4
PIF_VALUE: 27
PIF_VALUE: 2
PIF_VALUE: 2
PIF_VALUE: 4
PIF_VALUE: 1
PIF_VALUE: 2
PIF_VALUE: 5
PIF_VALUE: 6
PIF_VALUE: 5
PIF_VALUE: 2
PIF_VALUE: 2
PIF_VALUE: 17
PIF_VALUE: 19
PIF_VALUE: 23
PIF_VALUE: 16
PIF_VALUE: 2
PIF_VALUE: 16
PIF_VALUE: 11
PIF_VALUE: 6
PIF_VALUE: 5
PIF_VALUE: 16
PIF_VALUE: 4
PIF_VALUE: 16
PIF_VALUE: 2
PIF_VALUE: 2
PIF_VALUE: 17
PIF_VALUE: 9
PIF_VALUE: 4
PIF_VALUE: 5
PIF_VALUE: 2
PIF_VALUE: 4
PIF_VALUE: 4
PIF_VALUE: 6
PIF_VALUE: 7
PIF_VALUE: 2
PIF_VALUE: 5
PIF_VALUE: 6
PIF_VALUE: 5
PIF_VALUE: 5
PIF_VALUE: 6
PIF_VALUE: 16
PIF_VALUE: 4
PIF_VALUE: 17
PIF_VALUE: 17
PIF_VALUE: 4
PIF_VALUE: 2
PIF_VALUE: 11
PIF_VALUE: 4
PIF_VALUE: 5

## 2021-11-12 ASSESSMENT — PAIN DESCRIPTION - DESCRIPTORS: DESCRIPTORS: ACHING;CONSTANT

## 2021-11-12 ASSESSMENT — ENCOUNTER SYMPTOMS: SHORTNESS OF BREATH: 1

## 2021-11-12 ASSESSMENT — PAIN - FUNCTIONAL ASSESSMENT: PAIN_FUNCTIONAL_ASSESSMENT: 0-10

## 2021-11-12 NOTE — BRIEF OP NOTE
Brief Postoperative Note      Patient: Barbara Page  YOB: 1970  MRN: [id number]    Date of Procedure: 11/12/2021       Pre-Op Diagnosis: EQUINAS DEFORMITY OF LEFT FOOT    Post-Op Diagnosis: Same       Procedure(s):  LEFT LEG GASTROCS RECESSION    Surgeon(s):  Keith Sainz MD    Assistant:  Resident: Carina Baig DPM    Anesthesia: General    Estimated Blood Loss (mL): Minimal    Complications: None    Specimens:   * No specimens in log *    Implants:  * No implants in log *      Drains: * No LDAs found *    Findings: See op note    Electronically signed by Keith Sainz MD on 11/12/2021 at 9:32 AM

## 2021-11-12 NOTE — ANESTHESIA PRE PROCEDURE
Department of Anesthesiology  Preprocedure Note       Name:  Josh Green   Age:  46 y.o.  :  1970                                          MRN:  [id number]         Date:  2021      Surgeon: Susie Anna):  Kvng Galo MD    Procedure: Procedure(s):  LEFT LEG GASTROCS RECESSION    Medications prior to admission:   Prior to Admission medications    Medication Sig Start Date End Date Taking? Authorizing Provider   gabapentin (NEURONTIN) 300 MG capsule TAKE 1 CAPSULE BY MOUTH THREE TIMES DAILY.  11/3/21 2/2/22 Yes Junior Ortiz MD   insulin lispro (HUMALOG KWIKPEN U-200) 200 UNIT/ML SOPN pen 40 units Plus scale - Max dose 160 units per day 11/3/21  Yes Junior Ortiz MD   empagliflozin (JARDIANCE) 25 MG tablet Take 1 tablet by mouth daily New dose 21  Yes Junior Ortiz MD   Calcium-Phosphorus-Vitamin D (CALCIUM/D3 ADULT GUMMIES PO) Take 2 tablets by mouth daily   Yes Historical Provider, MD   sertraline (ZOLOFT) 50 MG tablet Take 50 mg by mouth daily   Yes Historical Provider, MD   Multiple Vitamins-Minerals (THERAPEUTIC MULTIVITAMIN-MINERALS) tablet Take 1 tablet by mouth daily   Yes Historical Provider, MD   atorvastatin (LIPITOR) 40 MG tablet Take 1 tablet by mouth nightly 21  Yes Dino Guallpa MD   fenofibrate (TRICOR) 145 MG tablet Take 1 tablet by mouth daily 21  Yes Dino Guallpa MD   levothyroxine (SYNTHROID) 50 MCG tablet Take 1 tablet by mouth daily 21  Yes Dino Guallpa MD   metoprolol tartrate (LOPRESSOR) 50 MG tablet Take 1 tablet by mouth 2 times daily 21  Yes Dino Guallpa MD   Insulin Glargine, 2 Unit Dial, 300 UNIT/ML SOPN Change to 75 units BID and every 10 days increase by 5 units both times, to get am sugars 150-200  Patient taking differently: Change to 80 units BID and every 10 days increase by 5 units both times, to get am sugars 150-200 21  Yes Phuc Ortega MD   lisinopril (PRINIVIL;ZESTRIL) 5 MG tablet Take 1 tablet by mouth daily 3/17/21  Yes Fermín Sellers  INCISION AND DRAINAGE Right 2019    I&D RIGHT STUMP performed by Kristin Smiley MD at [address] [facility] Right 2016    LEG AMPUTATION BELOW KNEE Right 2019    I&D AND REVISION OF AMPUTATION RIGHT LEG performed by Kristin Smiley MD at [address] Right 1/14/15    sole of foot I&D    AL DRAIN INFECT SHOULDER BURSA Left 2017    LEFT SHOULDER INCISION AND DRAINAGE performed by Kristin Smiley MD at [address] [facility] OFFICE/OUTPT [facility] Right 2018    EXCISIONAL DEBRIDEMENT RIGHT BKA STUMP performed by Silvio Gregory MD at [address] Right 1/16/15    2nd toe with wound vac applied    UPPER GASTROINTESTINAL ENDOSCOPY N/A 3/3/2020    EGD DILATION SAVORY performed by Lalito Sands MD at [facility]  ? when       Social History:    Social History     Tobacco Use    Smoking status: Current Some Day Smoker     Packs/day: 0.00     Years: 10.00     Pack years: 0.00     Types: Cigars     Start date: 1985     Last attempt to quit: 2000     Years since quittin.2    Smokeless tobacco: Never Used   Substance Use Topics    Alcohol use: Not Currently     Alcohol/week: 0.0 standard drinks                                Ready to quit: Not Answered  Counseling given: Not Answered      Vital Signs (Current):   Vitals:    21 1041 21 0630   BP:  (!) 175/83   Pulse:  100   Resp:  18   Temp:  96.2 °F (35.7 °C)   TempSrc:  Temporal   SpO2:  97%   Weight: 225 lb (102.1 kg) 225 lb (102.1 kg)   Height: 5' 6\" (1.676 m) 5' 6\" (1.676 m)                                              BP Readings from Last 3 Encounters:   21 (!) 175/83   10/18/21 138/70   10/13/21 (!) 156/80       NPO Status: Time of last liquid consumption: 1900                        Time of last solid consumption: 1700                        Date of last liquid consumption: 21                        Date of last solid food consumption: 11/11/21    BMI:   Wt Readings from Last 3 Encounters:   11/12/21 225 lb (102.1 kg)   08/24/21 220 lb (99.8 kg)   08/16/21 225 lb (102.1 kg)     Body mass index is 36.32 kg/m².     CBC:   Lab Results   Component Value Date    WBC 11.4 10/29/2021    RBC 5.07 10/29/2021    HGB 15.9 10/29/2021    HCT 45.0 10/29/2021    MCV 88.8 10/29/2021    RDW 14.5 12/26/2020     10/29/2021       CMP:   Lab Results   Component Value Date     10/29/2021    K 4.2 10/29/2021    K 4.2 07/28/2021    CL 99 10/29/2021    CO2 25 10/29/2021    BUN 20 10/29/2021    CREATININE 0.9 10/29/2021    GFRAA >60 08/24/2016    LABGLOM 88 10/29/2021    GLUCOSE 112 10/29/2021    GLUCOSE 114 12/26/2020    PROT 7.6 10/29/2021    CALCIUM 9.9 10/29/2021    BILITOT 0.4 10/29/2021    ALKPHOS 70 10/29/2021    AST 23 10/29/2021    ALT 17 10/29/2021       POC Tests:   Recent Labs     11/12/21  0644   POCGLU 139*       Coags:   Lab Results   Component Value Date    PROTIME 12.0 12/26/2020    PROTIME 12.2 01/03/2019    INR 1.04 12/26/2020    APTT 33.0 12/26/2020       HCG (If Applicable): No results found for: PREGTESTUR, PREGSERUM, HCG, HCGQUANT     ABGs: No results found for: PHART, PO2ART, YFZ8HVZ, CAP3HYE, BEART, S5TOOICL     Type & Screen (If Applicable):  Lab Results   Component Value Date    LABRH POS 03/02/2020       Drug/Infectious Status (If Applicable):  Lab Results   Component Value Date    HEPCAB Negative 10/10/2018       COVID-19 Screening (If Applicable):   Lab Results   Component Value Date    COVID19 NOT DETECTED 08/24/2021    COVID19 Not Detected 09/03/2020           Anesthesia Evaluation  Patient summary reviewed and Nursing notes reviewed no history of anesthetic complications:   Airway: Mallampati: III  TM distance: >3 FB   Neck ROM: full  Mouth opening: > = 3 FB Dental:          Pulmonary:normal exam  breath sounds clear to auscultation  (+) COPD:  shortness of breath:  sleep apnea: Cardiovascular:  Exercise tolerance: poor (<4 METS),   (+) hypertension:, ROCHE:,       ECG reviewed                        Neuro/Psych:   (+) neuromuscular disease:, psychiatric history:            GI/Hepatic/Renal:   (+) GERD:,           Endo/Other:    (+) DiabetesType II DM, , hypothyroidism::., .          Pt had no PAT visit       Abdominal:   (+) obese,     Abdomen: soft. Vascular: negative vascular ROS. Other Findings:             Anesthesia Plan      general     ASA 3       Induction: intravenous. MIPS: Postoperative opioids intended and Prophylactic antiemetics administered. Anesthetic plan and risks discussed with patient and spouse. Plan discussed with CRNA.                   Rachelle Latham DO   11/12/2021

## 2021-11-12 NOTE — PROGRESS NOTES
2426: Patient arrived to Phase I recovery via cart. Eyes closed with spontaneous respirations even and unlabored. Report received from Corky Avalos CRNA and Jaycee, [facility].  2208: VSS, patient responding to voice. Splint to LLE clean, dry and intact. Toes cool to touch with cap refill <3 seconds. 0930: VSS, patient denies pain at this time. 0940: Patient alert, continues to deny pain. VSS. HOB elevated per patient request.   5834: VSS. Dr. Octavio Roque at bedside. 8017: VSS.  [id number]: Patient completed Phase I recovery. 0957: Entered into Phase II recovery via cart. Girlfriend at bedside. Snack and drink provided. Call light placed within reach. 1021: Discharge instructions reviewed with patient and patient's girlfriend. AVS and scripts for Percocet, Flexeril and ASA provided to patient's girlfriend for discharge. 1025: Patient sat edge of bed without difficulty and denies dizziness/lightheadedness. Dressed with girlfriend's assistance. 1034: Discharged home in stable condition with girlfriend.

## 2021-11-12 NOTE — H&P
[address] [place] [address]                       PREOPERATIVE HISTORY AND PHYSICAL    PATIENT NAME: Nelli Dacosta                     :        1970  MED REC NO:   [id number]                           ROOM:  ACCOUNT NO:   [de-identified]                           ADMIT DATE: 2021  PROVIDER:     Letitia Cerda. Jordan Thomas MD    DATE OF PLANNED PROCEDURE:  2021    PLANNED PROCEDURE:  Left leg gastroc recession. PREOPERATIVE DIAGNOSES/CHIEF COMPLAINT:  1. Left leg gastroc equinus deformity. 2.  Left Achilles tendonitis, tendinosis insertional.  3.  Diabetes with peripheral neuropathy. 4.  Peripheral vascular disease. 5.  Status post right leg above-the-knee amputation. HISTORY:  The patient is a 64-year-old left chronic insertional Achilles  tendonitis, at this point elected to proceed with gastroc recession. Risks and benefits explained, wished to proceed. Peripheral neuropathy  documented on EMG/nerve conduction study done by Dr. Baron Mendiola 2021. Arterial Doppler study performed [facility] 2021  indicated peripheral vascular disease _____ left leg surgery. Risks and  benefits explained including numbness over the incision, infection, DVT,  worsening of pain, prolonged swelling. All the risks, benefits  explained, wished to proceed. PAST MEDICAL HISTORY:  Positive depression, diabetes, peripheral artery  disease, sleep apnea. MEDICATION:  Zoloft, NovoLog, Neurontin. ALLERGIES:  1. PENICILLIN GIVES HIM SOME SHORTNESS OF BREATH, NAUSEA, VOMITING (SHE  DOES TOLERATE AMOXICILLIN, SEVERAL DIFFERENT CEPHALOSPORINS, SO ANCEF  PREOP). 2.  ACTOS GIVES HIM SOME SWELLING. 3.  CLINDAMYCIN GIVES HIM ITCHING. 4.  VANCOMYCIN GIVES HIM HIVES. 5.  METFORMIN GIVE HIM SWELLING. PAST SURGICAL HISTORY:  Right BKA, right AKA, shoulder surgery, carpal  tunnel. SOCIAL HISTORY:  Negative for smoking. Positive for smoking with  tobacco use. Negative to ethanol use. FAMILY HISTORY:  Osteoarthritis, diabetes. PHYSICAL EXAMINATION:  GENERAL:  Well-developed, well-nourished man. Mood and affect  appropriate. Alert x3. Height 5' 6. Weight 190. HEAD AND NECK:  No obvious deformity. CHEST:  No obvious deformity. ABDOMEN:  No obvious deformity. EXTREMITIES:  Right AKA well healed, left insertional Achilles tendon  pain, left gastroc equinus deformity with Silfverskiold test.    IMPRESSION:  As above. PLAN:  1. Plan to proceed with procedure. 2.  Overnight stay for pain control. 3.  Postop weightbearing left lower extremity, does have an AKA  prosthetic, not been able to use it. BKA performed in 2016. AKA  performed in 02/2020. 4.  Percocet postop. 5.  Ancef preop. 6.  Aspirin for DVT prophylaxis. 7.  Followup appointment two weeks postop at [facility]. 8.  Percocet, Aspirin prescriptions rewritten. 9.  All questions and concerns answered.         Seth Negrete MD    D: 11/12/2021 6:01:58       T: 11/12/2021 8:59:20     [id number]  Job#: [id number]     Doc#: [id number]    CC:

## 2021-11-12 NOTE — ANESTHESIA PROCEDURE NOTES
Peripheral Block    Patient location during procedure: OR  Start time: 11/12/2021 7:36 AM  End time: 11/12/2021 7:39 AM  Staffing  Performed: anesthesiologist   Anesthesiologist: Monroe Patience, DO  Preanesthetic Checklist  Completed: patient identified, IV checked, site marked, risks and benefits discussed, surgical consent, monitors and equipment checked, pre-op evaluation, timeout performed, anesthesia consent given, oxygen available and patient being monitored  Peripheral Block  Patient position: supine  Prep: ChloraPrep  Patient monitoring: continuous pulse ox, frequent blood pressure checks, IV access, cardiac monitor and continuous capnometry  Block type: Sciatic  Laterality: left  Injection technique: single-shot  Guidance: nerve stimulator and ultrasound guided  Popliteal  Provider prep: mask and sterile gloves  Needle  Needle type: combined needle/nerve stimulator   Needle gauge: 21 G  Needle length: 10 cm  Needle localization: anatomical landmarks, nerve stimulator and ultrasound guidance  Needle insertion depth: 3.2 cm  Assessment  Injection assessment: negative aspiration for heme, no paresthesia on injection and local visualized surrounding nerve on ultrasound  Paresthesia pain: none  Slow fractionated injection: yes  Hemodynamics: stable  Medications Administered  Ropivacaine (NAROPIN) injection 0.5%, 25 mL  Reason for block: post-op pain management and at surgeon's request

## 2021-11-12 NOTE — ANESTHESIA POSTPROCEDURE EVALUATION
Department of Anesthesiology  Postprocedure Note    Patient: Juan R Barrera  MRN: [id number]  YOB: 1970  Date of evaluation: 11/12/2021  Time:  10:30 AM     Procedure Summary     Date: 11/12/21 Room / Location: [address] [place] [address] 02 / [address] [facility]    Anesthesia Start: 6028 Anesthesia Stop: 0736    Procedure: LEFT LEG GASTROCS RECESSION (Left Leg Lower) Diagnosis: Larance Rotunda DEFORMITY OF LEFT FOOT)    Surgeons: Sam Benites MD Responsible Provider: Saurabh Ring DO    Anesthesia Type: general ASA Status: 3          Anesthesia Type: general    Everette Phase I: Everette Score: 9    Everette Phase II: Everette Score: 10    Last vitals: Reviewed and per EMR flowsheets.        Anesthesia Post Evaluation    Patient location during evaluation: PACU  Patient participation: complete - patient participated  Level of consciousness: awake and alert  Pain score: 0  Airway patency: patent  Nausea & Vomiting: no nausea and no vomiting  Complications: no  Cardiovascular status: hemodynamically stable and blood pressure returned to baseline  Respiratory status: spontaneous ventilation, room air and acceptable  Hydration status: stable

## 2021-11-17 NOTE — OP NOTE
[address] [id number]                                OPERATIVE REPORT    PATIENT NAME: Minor Antonio                     :        1970  MED REC NO:   [id number]                           ROOM:  ACCOUNT NO:   [de-identified]                           ADMIT DATE: 2021  PROVIDER:     Marcelo Ulloa. Octavia Mijares MD    DATE OF PROCEDURE:  2021    PREOPERATIVE DIAGNOSIS:  Left foot equinus deformity. POSTOPERATIVE DIAGNOSIS:  Left foot equinus deformity. OPERATION PERFORMED:  Left leg gastroc recession. SURGEON:  Gautam Mijares MD    ASSISTANT:  Cathrine Bloch, DPM    Note, Josy Rebollar DPM served as first assistant throughout the  procedure, helped in prepping and draping the patient, positioning the  patient, wound retraction, wound closure, and wound dressing  application. He was there for the entire procedure.    _____    ANESTHETIC:  General with popliteal block. ESTIMATED BLOOD LOSS:  1 mL/trace. COMPLICATIONS:  None. SPECIMEN SENT:  None. TIMEOUT:  Performed. SITE:  Signed. PREOPERATIVE ANTIBIOTICS:  Given. HISTORY AND INDICATIONS:  The patient is a 58-year-old male with left  leg equinus deformity, symptomatic, insertional Achilles tendonitis  _____ metatarsalgia, high risk for diabetic ulceration, elected to  proceed with surgery. Risks and benefits were explained, wished to  proceed. PROCEDURE DESCRIPTION AND FINDINGS:  The patient was seen in the  preoperative area. Consent checked, leg marked, indicated wished to  proceed, transported to the operating room, transferred to the operating  table, general anesthetic induced, good analgesia throughout the case. The patient was placed in prone position. Upper and lower extremities  well padded. Thigh tourniquet had been placed, but was not used  throughout the procedure. Leg was prepped and draped. Timeout  performed.   An incision was made posterior medial aspect of the leg down  to skin, subcutaneous tissue protecting the area of the sural nerve. I  made an incision in the gastroc fascia. There did not appear to be a  good delineation between the gastroc fascia and the soleus fascia. This  appeared to relieve the equinus deformity. Irrigated with copious  amounts of normal saline solution. Skin closed with 3-0 Monocryl subcu. Skin closed with 3-0 nylon running as well interrupted closure. Dressings were applied. The patient was placed in a posterior splint  with the ankle held at 90 degrees as per standard technique, transported  to the PACU in good condition. He tolerated the procedure well. Shireen Zamudio DPM, assisted throughout the procedure with positioning,  draping, retraction, wound closure, dressing, and splint application.         January Mcdonough MD    D: 11/17/2021 4:45:24       T: 11/17/2021 4:48:47     [email or web address]  Job#: [id number]     Doc#: [id number]    CC:

## 2021-11-24 NOTE — ED NOTES
Pt called out for pain medication. Provider notified at this time.       Jessie Victoria  03/08/20 [id number]
Pt medicated per MAR. Pt resting on cot comfortably, respirations unlabored.       Jessie Victoria  03/08/20 [id number]
24-Nov-2021 10:44

## 2021-11-29 RX ORDER — INSULIN PUMP CONTROLLER
EACH MISCELLANEOUS
Qty: 45 EACH | Refills: 3 | Status: SHIPPED | OUTPATIENT
Start: 2021-11-29 | End: 2022-01-18

## 2021-11-30 ENCOUNTER — PATIENT MESSAGE (OUTPATIENT)
Dept: INTERNAL MEDICINE CLINIC | Age: 51
End: 2021-11-30

## 2021-11-30 DIAGNOSIS — S78.111A ABOVE KNEE AMPUTATION OF RIGHT LOWER EXTREMITY (HCC): Primary | ICD-10-CM

## 2021-11-30 NOTE — TELEPHONE ENCOUNTER
From: Maik Solorzano  To: Dr. Napier Oh: 11/30/2021 1:39 PM EST  Subject: Script for Wheelchair    I need a script sent to [address] [place] [address] for a new wheelchair

## 2021-12-03 ENCOUNTER — APPOINTMENT (OUTPATIENT)
Dept: GENERAL RADIOLOGY | Age: 51
End: 2021-12-03
Payer: MEDICARE

## 2021-12-03 ENCOUNTER — APPOINTMENT (OUTPATIENT)
Dept: INTERVENTIONAL RADIOLOGY/VASCULAR | Age: 51
End: 2021-12-03
Payer: MEDICARE

## 2021-12-03 ENCOUNTER — HOSPITAL ENCOUNTER (EMERGENCY)
Age: 51
Discharge: HOME | End: 2021-12-03
Payer: MEDICARE

## 2021-12-03 VITALS
OXYGEN SATURATION: 96 % | BODY MASS INDEX: 35.84 KG/M2 | HEIGHT: 66 IN | HEART RATE: 95 BPM | TEMPERATURE: 98 F | DIASTOLIC BLOOD PRESSURE: 92 MMHG | WEIGHT: 223 LBS | RESPIRATION RATE: 16 BRPM | SYSTOLIC BLOOD PRESSURE: 166 MMHG

## 2021-12-03 DIAGNOSIS — M79.662 PAIN AND SWELLING OF LEFT LOWER LEG: ICD-10-CM

## 2021-12-03 DIAGNOSIS — M79.672 LEFT FOOT PAIN: ICD-10-CM

## 2021-12-03 DIAGNOSIS — M79.89 PAIN AND SWELLING OF LEFT LOWER LEG: ICD-10-CM

## 2021-12-03 DIAGNOSIS — R22.42 LOCALIZED SWELLING OF LEFT FOOT: ICD-10-CM

## 2021-12-03 DIAGNOSIS — J06.9 VIRAL UPPER RESPIRATORY TRACT INFECTION: Primary | ICD-10-CM

## 2021-12-03 LAB
ALBUMIN SERPL-MCNC: 4 G/DL (ref 3.5–5.1)
ALP BLD-CCNC: 70 U/L (ref 38–126)
ALT SERPL-CCNC: 21 U/L (ref 11–66)
ANION GAP SERPL CALCULATED.3IONS-SCNC: 11 MEQ/L (ref 8–16)
AST SERPL-CCNC: 29 U/L (ref 5–40)
BASOPHILS # BLD: 0.5 %
BASOPHILS ABSOLUTE: 0.1 THOU/MM3 (ref 0–0.1)
BILIRUB SERPL-MCNC: 0.4 MG/DL (ref 0.3–1.2)
BUN BLDV-MCNC: 18 MG/DL (ref 7–22)
CALCIUM SERPL-MCNC: 9.5 MG/DL (ref 8.5–10.5)
CHLORIDE BLD-SCNC: 100 MEQ/L (ref 98–111)
CO2: 26 MEQ/L (ref 23–33)
CREAT SERPL-MCNC: 0.8 MG/DL (ref 0.4–1.2)
EOSINOPHIL # BLD: 4.9 %
EOSINOPHILS ABSOLUTE: 0.5 THOU/MM3 (ref 0–0.4)
ERYTHROCYTE [DISTWIDTH] IN BLOOD BY AUTOMATED COUNT: 14.4 % (ref 11.5–14.5)
ERYTHROCYTE [DISTWIDTH] IN BLOOD BY AUTOMATED COUNT: 46 FL (ref 35–45)
FLU A ANTIGEN: NEGATIVE
FLU B ANTIGEN: NEGATIVE
GFR SERPL CREATININE-BSD FRML MDRD: > 90 ML/MIN/1.73M2
GLUCOSE BLD-MCNC: 143 MG/DL (ref 70–108)
HCT VFR BLD CALC: 38.6 % (ref 42–52)
HEMOGLOBIN: 13.4 GM/DL (ref 14–18)
IMMATURE GRANS (ABS): 0.13 THOU/MM3 (ref 0–0.07)
IMMATURE GRANULOCYTES: 1.2 %
LIPASE: 31.8 U/L (ref 5.6–51.3)
LYMPHOCYTES # BLD: 21.8 %
LYMPHOCYTES ABSOLUTE: 2.4 THOU/MM3 (ref 1–4.8)
MCH RBC QN AUTO: 30.7 PG (ref 26–33)
MCHC RBC AUTO-ENTMCNC: 34.7 GM/DL (ref 32.2–35.5)
MCV RBC AUTO: 88.5 FL (ref 80–94)
MONOCYTES # BLD: 7 %
MONOCYTES ABSOLUTE: 0.8 THOU/MM3 (ref 0.4–1.3)
NUCLEATED RED BLOOD CELLS: 0 /100 WBC
OSMOLALITY CALCULATION: 278.2 MOSMOL/KG (ref 275–300)
PLATELET # BLD: 262 THOU/MM3 (ref 130–400)
PMV BLD AUTO: 8.7 FL (ref 9.4–12.4)
POTASSIUM REFLEX MAGNESIUM: 3.8 MEQ/L (ref 3.5–5.2)
RBC # BLD: 4.36 MILL/MM3 (ref 4.7–6.1)
SARS-COV-2, NAAT: NOT  DETECTED
SEG NEUTROPHILS: 64.6 %
SEGMENTED NEUTROPHILS ABSOLUTE COUNT: 7.2 THOU/MM3 (ref 1.8–7.7)
SODIUM BLD-SCNC: 137 MEQ/L (ref 135–145)
TOTAL PROTEIN: 7 G/DL (ref 6.1–8)
TROPONIN T: < 0.01 NG/ML
WBC # BLD: 11.2 THOU/MM3 (ref 4.8–10.8)

## 2021-12-03 PROCEDURE — 83690 ASSAY OF LIPASE: CPT

## 2021-12-03 PROCEDURE — 99283 EMERGENCY DEPT VISIT LOW MDM: CPT

## 2021-12-03 PROCEDURE — 6370000000 HC RX 637 (ALT 250 FOR IP): Performed by: NURSE PRACTITIONER

## 2021-12-03 PROCEDURE — 87635 SARS-COV-2 COVID-19 AMP PRB: CPT

## 2021-12-03 PROCEDURE — 85025 COMPLETE CBC W/AUTO DIFF WBC: CPT

## 2021-12-03 PROCEDURE — 87804 INFLUENZA ASSAY W/OPTIC: CPT

## 2021-12-03 PROCEDURE — 80053 COMPREHEN METABOLIC PANEL: CPT

## 2021-12-03 PROCEDURE — 84484 ASSAY OF TROPONIN QUANT: CPT

## 2021-12-03 PROCEDURE — 36415 COLL VENOUS BLD VENIPUNCTURE: CPT

## 2021-12-03 PROCEDURE — 71045 X-RAY EXAM CHEST 1 VIEW: CPT

## 2021-12-03 PROCEDURE — 93971 EXTREMITY STUDY: CPT

## 2021-12-03 RX ORDER — IBUPROFEN 200 MG
600 TABLET ORAL ONCE
Status: COMPLETED | OUTPATIENT
Start: 2021-12-03 | End: 2021-12-03

## 2021-12-03 RX ADMIN — IBUPROFEN 600 MG: 200 TABLET, FILM COATED ORAL at 21:39

## 2021-12-03 ASSESSMENT — ENCOUNTER SYMPTOMS
CONSTIPATION: 0
NAUSEA: 0
ABDOMINAL PAIN: 0
COUGH: 1
RHINORRHEA: 0
SINUS PRESSURE: 0
BLOOD IN STOOL: 0
VOMITING: 0
SHORTNESS OF BREATH: 0
EYE PAIN: 0
WHEEZING: 0
DIARRHEA: 0

## 2021-12-03 ASSESSMENT — PAIN SCALES - GENERAL
PAINLEVEL_OUTOF10: 10
PAINLEVEL_OUTOF10: 6

## 2021-12-04 NOTE — ED TRIAGE NOTES
Patient presents from home for chief complaint of foot pain following a surgery \"where they stretched my achilles tendon\" as well as ear pain when yawning. Patient is wearing a compression sock in triage. Noted to be an amputee as well, has a left AKA. Patient states the ear pain happens when he yawns, \"it feels like my whole face is going to lock up. \" Denies any recent antibiotics.  Patient AOx4, pwd. Breathing without difficulty at rest.

## 2021-12-04 NOTE — ED PROVIDER NOTES
[address] [facility] [address] [address] EMERGENCY DEPT  EMERGENCY MEDICINE     Pt Name: Gordo Santillan  MRN: [id number]  [place] 1970  Date of evaluation: 12/3/2021  PCP:    Gideon Bourgeois MD  Provider: MARILYN Carter - CNP    CHIEF COMPLAINT       Chief Complaint   Patient presents with   Candance Lion    Foot Swelling       Location/Symptom: Right foot pain  Timing/Onset: Last several days  Context/Setting: Patient had part of his Achilles tendon on the left removed and had it stretched on 11/12/2021. He states that he does not have pain at the surgical site, but the foot is swollen and painful. Quality: Ache  Duration: Constant  Modifying Factors: None  Severity: 10 out of 10    HISTORY OF PRESENT ILLNESS    Marixa Casey is a 23-year-old male patient that has a past medical history of having a Achilles tendon surgery on 11/12/2021 with Dr. Bartolo Deluca. He states that in the last several days he has noticed left foot swelling and pain so much so that he has been unable to get some of his shoes on the foot. He also complains of productive cough with green sputum. And right ear/jaw pain when he yawns. He states that it feels [place] it is going to lock up\". He does not have teeth upper or lower. He denies chest pain, shortness of breath, nausea, vomiting, diarrhea or fever. Triage notes and Nursing notes were reviewed by myself. Any discrepancies are addressed above. PAST MEDICAL HISTORY     Past Medical History:   Diagnosis Date    Bipolar 2 disorder [facility])     previously followed with Dr. Libbie Boas and Anastasia Stroud in [facility] BPH (benign prostatic hyperplasia)     Diabetes mellitus type 2, uncontrolled ([address]     HgbA1c on 4/2/2019 was 9.1.     Diabetic peripheral neuropathy ([address]     Diabetic polyneuropathy ([address]     Diabetic ulcer of right foot associated with type 2 diabetes mellitus ([address] 12/10/2015    Essential hypertension     \"never been on b/p medication that I know of\"    GERD (gastroesophageal reflux disease)     Hammer toe of left foot     Heart murmur     denies any chest pain or palpitations    History of tobacco abuse     Hx of AKA (above knee amputation), right ([facility] 04/18/2019    Dr. Dione Tillman edema, diabetic, bilateral ([address] 05/04/2018    Dr. Shaheen Goodman referred to retina specialist for 2nd opinion    Marijuana abuse in remission     Melena     MRSA (methicillin resistant staph aureus) culture positive     Onychomycosis     Other disorders of kidney and ureter in diseases classified elsewhere     Sleep apnea     no cpap    Thyroid disease     WD-Skin ulcer of fourth toe of right foot with necrosis of bone ([facility] 6/29/2016       SURGICAL HISTORY       Past Surgical History:   Procedure Laterality Date    ABSCESS DRAINAGE Right     foot    AMPUTATION ABOVE KNEE Right 4/18/2019    RIGHT ABOVE KNEE AMPUTATION performed by Emmanuel Enrique MD at [address] [facility], LAPAROSCOPIC N/A 4/1/2020    ROBOTIC CHOLECYST[facility]Y [facility] Sanju[facility]felter[facility] [address] [facility] 7/20[facility]    CYSTOSCOPY performed by Kim Hernandes MD at [address] 8/21/2020    CYSTOSCOPY, UROLIFT performed by Kim Hernandes MD at [address], COLON, DIAGNOSTIC     [address] [facility] Right 07/01/2016    I & D    GASTROCNEMIUS RECESSION Left 11/12/2021    LEFT LEG GASTROCS RECESSION performed by Cynthia Koehler MD at [address] [facility] Right 2/18/2019    I&D RIGHT STUMP performed by Emmanuel Enrique MD at [address] [facility] Right 07/20/2016    LEG AMPUTATION BELOW KNEE Right 4/4/2019    I&D AND REVISION OF AMPUTATION RIGHT LEG performed by Emmanuel Enrique MD at [address],[facility] Right 1/14/15    sole of foot I&D    NC DRAIN INFECT SHOULDER BURSA Left 8/18/2017    LEFT SHOULDER INCISION AND DRAINAGE performed by Emmanuel Enrique MD at [address] [facility] OFFICE/OUTPT [address] [facility] Right 9/20/2018    EXCISIONAL DEBRIDEMENT RIGHT BKA STUMP performed by Liya Lucas MD at [address] Right 1/16/15    2nd toe with wound vac applied    UPPER GASTROINTESTINAL ENDOSCOPY N/A 3/3/2020    EGD DILATION SAVORY performed by Natalio Mckeon MD at [address] [facility]  ? when       CURRENT MEDICATIONS       Previous Medications    ARIPIPRAZOLE (ABILIFY) 5 MG TABLET    Take 5 mg by mouth daily     ATORVASTATIN (LIPITOR) 40 MG TABLET    Take 1 tablet by mouth nightly    CALCIUM-PHOSPHORUS-VITAMIN D (CALCIUM/D3 ADULT GUMMIES PO)    Take 2 tablets by mouth daily    CONTINUOUS BLOOD GLUC  (DEXCOM G6 ) LIANNE    1 Device by Does not apply route 4 times daily (before meals and nightly)    CONTINUOUS BLOOD GLUC SENSOR (DEXCOM G6 SENSOR) MISC    1 Device by Does not apply route every 14 days    CONTINUOUS BLOOD GLUC TRANSMIT (DEXCOM G6 TRANSMITTER) MISC    1 Device by Does not apply route every 3 months    EMPAGLIFLOZIN (JARDIANCE) 25 MG TABLET    Take 1 tablet by mouth daily New dose    FENOFIBRATE (TRICOR) 145 MG TABLET    Take 1 tablet by mouth daily    GABAPENTIN (NEURONTIN) 300 MG CAPSULE    TAKE 1 CAPSULE BY MOUTH THREE TIMES DAILY.     INSULIN DISPOSABLE PUMP (OMNIPOD DASH 5 PACK PODS) MISC    CHANGE PODS EVERY 48 HOURS AS DIRECTED    INSULIN GLARGINE, 2 UNIT DIAL, 300 UNIT/ML SOPN    Change to 75 units BID and every 10 days increase by 5 units both times, to get am sugars 150-200    INSULIN LISPRO (HUMALOG KWIKPEN U-200) 200 UNIT/ML SOPN PEN    40 units Plus scale - Max dose 160 units per day    INSULIN PEN NEEDLE (PEN NEEDLES) 31G X 8 MM MISC    For use with insulin injections five timed per day dx:E11.9    LEVOTHYROXINE (SYNTHROID) 50 MCG TABLET    Take 1 tablet by mouth daily    LISINOPRIL (PRINIVIL;ZESTRIL) 5 MG TABLET    Take 1 tablet by mouth daily    METOPROLOL TARTRATE (LOPRESSOR) 50 MG TABLET    Take 1 tablet by mouth 2 times daily    MULTIPLE VITAMINS-MINERALS (THERAPEUTIC MULTIVITAMIN-MINERALS) TABLET    Take 1 tablet by mouth daily    NITROGLYCERIN (NITROSTAT) 0.3 MG SL TABLET    Place 1 tablet under the tongue every 5 minutes as needed for Chest pain up to max of 3 total doses. If no relief after 1 dose, call 911. SERTRALINE (ZOLOFT) 50 MG TABLET    Take 50 mg by mouth daily       ALLERGIES       Allergies   Allergen Reactions    Pcn [Penicillins] Shortness Of Breath, Nausea And Vomiting and Other (See Comments)     Does not remember reactions. Has TOLERATED amoxicillin and several different cephalosporins.  Actos [Pioglitazone] Swelling    Clindamycin/Lincomycin Itching    Vancomycin Hives      Rash, with erythematous plaques to abdomen, shoulders, and proximal upper extremities with pruritis, persisted with 2nd dose administered slower.       Metformin And Related Swelling       FAMILY HISTORY       Family History   Problem Relation Age of Onset    Diabetes Mother     Other Mother         pneumonia, H1N1    Depression Mother     Early Death Mother     High Blood Pressure Mother     High Cholesterol Mother     Vision Loss Maternal Grandmother     Arthritis Maternal Grandfather     Heart Disease Maternal Grandfather         SOCIAL HISTORY       Social History     Socioeconomic History    Marital status:      Spouse name: Not on file    Number of children: 2    Years of education: 15    Highest education level: Not on file   Occupational History    Not on file   Tobacco Use    Smoking status: Current Some Day Smoker     Packs/day: 0.00     Years: 10.00     Pack years: 0.00     Types: Cigars     Start date: 1985     Last attempt to quit: 2000     Years since quittin.2    Smokeless tobacco: Never Used   Vaping Use    Vaping Use: Former    Substances: Nicotine, Flavoring   Substance and Sexual Activity    Alcohol use: Not Currently     Alcohol/week: 0.0 standard drinks    Drug use: No     Comment: 30 YEARS AGO    Sexual activity: Yes     Partners: Female   Other Topics Concern    Not on file   Social History Narrative    Not on file     Social Determinants of Health     Financial Resource Strain: Low Risk     Difficulty of Paying Living Expenses: Not very hard   Food Insecurity: No Food Insecurity    Worried About Running Out of Food in the Last Year: Never true    Gwen of Food in the Last Year: Never true   Transportation Needs: No Transportation Needs    Lack of Transportation (Medical): No    Lack of Transportation (Non-Medical): No   Physical Activity:     Days of Exercise per Week: Not on file    Minutes of Exercise per Session: Not on file   Stress:     Feeling of Stress : Not on file   Social Connections:     Frequency of Communication with Friends and Family: Not on file    Frequency of Social Gatherings with Friends and Family: Not on file    Attends Buddhist Services: Not on file    Active Member of [address] [place] [address] [employer] or Organizations: Not on file    Attends Club or Organization Meetings: Not on file    Marital Status: Not on file   Intimate Partner Violence:     Fear of Current or Ex-Partner: Not on file    Emotionally Abused: Not on file    Physically Abused: Not on file    Sexually Abused: Not on file   Housing Stability:     Unable to Pay for Housing in the Last Year: Not on file    Number of [place] in the Last Year: Not on file    Unstable Housing in the Last Year: Not on file       REVIEW OF SYSTEMS     Review of Systems   Constitutional: Negative for appetite change, chills, fatigue, fever and unexpected weight change. HENT: Positive for ear pain (ear and jaw pain on left when yawning). Negative for rhinorrhea and sinus pressure. Eyes: Negative for pain and visual disturbance. Respiratory: Positive for cough. Negative for shortness of breath and wheezing. Cardiovascular: Positive for leg swelling. Negative for chest pain and palpitations.    Gastrointestinal: Negative for abdominal pain, blood in stool, constipation, diarrhea, nausea and vomiting. Genitourinary: Negative for dysuria, frequency and hematuria. Musculoskeletal: Negative for arthralgias and joint swelling. Skin: Negative for rash. Neurological: Negative for dizziness, syncope, weakness, light-headedness and headaches. Hematological: Does not bruise/bleed easily. Except as noted above the remainder of the review of systems was reviewed and is negative. SCREENINGS                        PHYSICAL EXAM    (up to 7 for level 4, 8 or more for level 5)     ED Triage Vitals [12/03/21 1918]   BP Temp Temp Source Pulse Resp SpO2 Height Weight   (!) 166/92 98 °F (36.7 °C) Oral 95 16 96 % 5' 6\" (1.676 m) 223 lb (101.2 kg)       Physical Exam  Vitals and nursing note reviewed. Constitutional:       General: He is not in acute distress. Appearance: He is well-developed. He is not diaphoretic. HENT:      Head: Normocephalic and atraumatic. Right Ear: Tympanic membrane and external ear normal.      Nose: No congestion or rhinorrhea. Mouth/Throat:      Pharynx: No oropharyngeal exudate or posterior oropharyngeal erythema. Eyes:      Conjunctiva/sclera: Conjunctivae normal.      Pupils: Pupils are equal, round, and reactive to light. Cardiovascular:      Rate and Rhythm: Normal rate and regular rhythm. Heart sounds: Normal heart sounds. No murmur heard. No gallop. Pulmonary:      Effort: Pulmonary effort is normal. No respiratory distress. Breath sounds: Normal breath sounds. No wheezing, rhonchi or rales. Abdominal:      General: Bowel sounds are normal.      Palpations: Abdomen is soft. Musculoskeletal:         General: Normal range of motion. Cervical back: Normal range of motion. Left lower leg: Tenderness present. Edema present. Legs:       Right Lower Extremity: Right leg is amputated above knee. Skin:     General: Skin is warm and dry.    Neurological: Mental Status: He is alert and oriented to person, place, and time. DIAGNOSTIC RESULTS     EKG:(none if blank)  All EKGs are interpreted by the Emergency Department Physician who either signs or Co-signs this chart in the absence of a cardiologist.        RADIOLOGY: (none if blank)   I directly visualized the following images and reviewed the radiologist interpretations. Interpretation per the Radiologist below, if available at the time of this note:  VL DUP LOWER EXTREMITY VENOUS LEFT   Final Result   No evidence of a DVT. **This report has been created using voice recognition software. It may contain minor errors which are inherent in voice recognition technology. **      Final report electronically signed by Dr. Rhiannon Leger on 12/3/2021 9:20 PM      XR CHEST PORTABLE   Final Result   No acute disease            **This report has been created using voice recognition software. It may contain minor errors which are inherent in voice recognition technology. **      Final report electronically signed by Dr. Rhiannon Leger on 12/3/2021 8:30 PM          LABS:  Labs Reviewed   CBC WITH AUTO DIFFERENTIAL - Abnormal; Notable for the following components:       Result Value    WBC 11.2 (*)     RBC 4.36 (*)     Hemoglobin 13.4 (*)     Hematocrit 38.6 (*)     RDW-SD 46.0 (*)     MPV 8.7 (*)     Eosinophils Absolute 0.5 (*)     Immature Grans (Abs) 0.13 (*)     All other components within normal limits   COMPREHENSIVE METABOLIC PANEL W/ REFLEX TO MG FOR LOW K - Abnormal; Notable for the following components:    Glucose 143 (*)     All other components within normal limits   RAPID INFLUENZA A/B ANTIGENS   COVID-19, RAPID   LIPASE   ANION GAP   OSMOLALITY   GLOMERULAR FILTRATION RATE, ESTIMATED   TROPONIN       All other labs were within normal range or not returned as of this dictation. Please note, any cultures that may have been sent were not resulted at the time of this patient visit.     EMERGENCY DEPARTMENT COURSE and Medical Decision Making:     Vitals:    Vitals:    12/03/21 1918   BP: (!) 166/92   Pulse: 95   Resp: 16   Temp: 98 °F (36.7 °C)   TempSrc: Oral   SpO2: 96%   Weight: 223 lb (101.2 kg)   Height: 5' 6\" (1.676 m)       PROCEDURES: (None if blank)  Procedures    ED Course as of 12/03/21 2146   [facility] Dec 03, 2021   2139 Discussed case with Dr preston. OK to discharge with instructions to keep leg elevated. [NW]      ED Course User Index  [NW] Odette Corinesayra, APRN - CNP     MDM  Number of Diagnoses or Management Options  Left foot pain: new, needed workup  Localized swelling of left foot: new, needed workup  Viral upper respiratory tract infection: new, needed workup     Amount and/or Complexity of Data Reviewed  Clinical lab tests: ordered and reviewed  Tests in the radiology section of CPT®: ordered and reviewed  Tests in the medicine section of CPT®: ordered and reviewed  Review and summarize past medical records: yes  Discuss the patient with other providers: yes  Independent visualization of images, tracings, or specimens: yes    Risk of Complications, Morbidity, and/or Mortality  Presenting problems: moderate  Diagnostic procedures: moderate  Management options: low    Patient Progress  Patient progress: improved    Patient presents with left foot swelling. He does state that he had surgery on his Achilles tendon on 11/12/2021. Swelling is not in that area though. The swelling is on the dorsal aspect of his foot. He states that it is prevented him from being able to put on his shoe. It is both painful and edematous. He also complains of a cough and a pain in his jaw when he yawns. Patient was examined. He has no teeth that could be infected as he has both upper and lower dentures. His jaw is not currently hurting. On examination he does have edema in the dorsal aspect of the foot and pretibial.  He does have a compression stocking on.   Labs and x-ray to work-up for pneumonia and

## 2021-12-04 NOTE — ED NOTES
RN medicated pt per [facility] and updated pt on POC.       Clint Hill RN  12/03/21 [id number]

## 2021-12-21 ENCOUNTER — OFFICE VISIT (OUTPATIENT)
Dept: INTERNAL MEDICINE CLINIC | Age: 51
End: 2021-12-21
Payer: MEDICARE

## 2021-12-21 VITALS — HEART RATE: 94 BPM | TEMPERATURE: 98.4 F | SYSTOLIC BLOOD PRESSURE: 144 MMHG | DIASTOLIC BLOOD PRESSURE: 68 MMHG

## 2021-12-21 DIAGNOSIS — E11.65 TYPE 2 DIABETES MELLITUS WITH HYPERGLYCEMIA, WITH LONG-TERM CURRENT USE OF INSULIN (HCC): ICD-10-CM

## 2021-12-21 DIAGNOSIS — Z79.4 TYPE 2 DIABETES MELLITUS WITH HYPERGLYCEMIA, WITH LONG-TERM CURRENT USE OF INSULIN (HCC): ICD-10-CM

## 2021-12-21 PROCEDURE — G0108 DIAB MANAGE TRN  PER INDIV: HCPCS | Performed by: INTERNAL MEDICINE

## 2021-12-21 RX ORDER — OXYCODONE HYDROCHLORIDE AND ACETAMINOPHEN 5; 325 MG/1; MG/1
1 TABLET ORAL EVERY 6 HOURS PRN
Status: ON HOLD | COMMUNITY
Start: 2021-11-17 | End: 2021-12-29

## 2021-12-21 NOTE — PATIENT INSTRUCTIONS
Continue your Dexcom and treat low alarms and address high blood sugars              With Humalog  If you are not eating but blood sugar is high at meal time =  Use only                              30 units Humalog  If you are eating, but your blood sugar is under 120 = take only 30 units              Of Humalog when you start eating  Take 40 units of Humalog for all other meals                 Add 5 units when eating and blood sugar is over 200  Let the clinic know if glucose levels are consistently elevated

## 2021-12-21 NOTE — PROGRESS NOTES
[facility]  [address] [facility], [address]  [phone] (phone)  [phone] (fax)    Patient ID: Mirtha Wu 1970  Referring Provider: Dr. Tino Chand    Patient's name and  were verified. Subjective:    He presents for His follow-up diabetic visit. He has type 2 diabetes mellitus. Home regimen includes: Insulin and SGLT-2 inhibitors He is noncompliant some of the time. Assessment:     Lab Results   Component Value Date    LABA1C 8.4 10/29/2021    BUN 18 2021    CREATININE 0.8 2021     There were no vitals filed for this visit. Wt Readings from Last 3 Encounters:   21 223 lb (101.2 kg)   21 225 lb (102.1 kg)   21 220 lb (99.8 kg)     Ht Readings from Last 3 Encounters:   21 5' 6\" (1.676 m)   21 5' 6\" (1.676 m)   21 5' 6\" (1.676 m)       Current monitoring regimen: home blood tests - 4+ times daily  Home blood sugar trends: See Dexcom CGM. Glucose levels are variable  Any episodes of hypoglycemia? yes - 2-3 per week; timing random--during the night/ before lunch and before dinner  Depression screening completed 21  Score 0  Previous visit with dietician: yes - 21  Current diet: B skips--banana. Less than 120-no insulin                       L salad/ cheese/ crackers/4-6 fig newtons/                                0 sugar soda                             spagetti 3 spoons (2 cups)                       D double cheeseburger                       Dolores snack -grapes/ cottage cheese                                  Fig newtons 4-6, silk high protein 8 oz  Current exercise: ADL's. May go back to [employer] next month. Has stretch bands at home  Eye exam current (within one year): no 2019  Any history of foot problems? yes Rt AKA  Last foot exam: 21 Dr. Yolie Meraz up to date: yes - 21  Taking ASA:  Yes   Appropriate for use of MyChart Glucose Grid:  Yes    Focus:     Diabetes education, Recent A1C 8.4%. Sil Ayers has tried the OmniPod insulin pump and states he does not want to try it again. His issues with the pump include that he feels it cannot give him enough insulin, it is too hard to fill the cartridges and he does not like it. He is robotically dosing his Humalog regardless if he eats or not. He is also holding his Humalog is his premeal blood sugar is in goal (under 120). Discussed plan as below. Reinforced the need to get insulin injections taken as scheduled daily to keep blood sugars in control. Sil Ayers is resistant to changes. Follow up 2 months. DSME PLAN:   Discussed general issues about diabetes pathophysiology and management. Counseling at today's visit: BG goals; carbs; exercise; bolus dosing r/t BG and carbs; treating low BS. Continue your Dexcom and treat low alarms and address high blood sugars              With Humalog  If you are not eating but blood sugar is high at meal time =  Use only                              30 units Humalog  If you are eating, but your blood sugar is under 120 = take only 30 units              Of Humalog when you start eating  Take 40 units of Humalog for all other meals                 Add 5 units when eating and blood sugar is over 200  Let the clinic know if glucose levels are consistently elevated    Meter download, medications, PMH and nursing assessment reviewed. Adela Mer states He is willing to participate in this plan of care and verbalized understanding of all instructions provided. Teach back used to verify comprehension. Total time involved in direct patient education: 60 minutes.

## 2021-12-29 ENCOUNTER — APPOINTMENT (OUTPATIENT)
Dept: GENERAL RADIOLOGY | Age: 51
DRG: 638 | End: 2021-12-29
Payer: MEDICARE

## 2021-12-29 ENCOUNTER — PATIENT MESSAGE (OUTPATIENT)
Dept: INTERNAL MEDICINE CLINIC | Age: 51
End: 2021-12-29

## 2021-12-29 ENCOUNTER — HOSPITAL ENCOUNTER (INPATIENT)
Age: 51
LOS: 3 days | Discharge: HOME | DRG: 638 | End: 2022-01-01
Attending: HOSPITALIST | Admitting: HOSPITALIST
Payer: MEDICARE

## 2021-12-29 ENCOUNTER — APPOINTMENT (OUTPATIENT)
Dept: INTERVENTIONAL RADIOLOGY/VASCULAR | Age: 51
DRG: 638 | End: 2021-12-29
Payer: MEDICARE

## 2021-12-29 ENCOUNTER — TELEPHONE (OUTPATIENT)
Dept: INTERNAL MEDICINE CLINIC | Age: 51
End: 2021-12-29

## 2021-12-29 DIAGNOSIS — E11.622 DIABETIC ULCER OF LEFT SHIN (HCC): Primary | ICD-10-CM

## 2021-12-29 DIAGNOSIS — T14.8XXA WOUND INFECTION: ICD-10-CM

## 2021-12-29 DIAGNOSIS — L03.116 CELLULITIS OF LEFT LOWER EXTREMITY: ICD-10-CM

## 2021-12-29 DIAGNOSIS — L97.829 DIABETIC ULCER OF LEFT SHIN (HCC): Primary | ICD-10-CM

## 2021-12-29 DIAGNOSIS — L08.9 WOUND INFECTION: ICD-10-CM

## 2021-12-29 PROBLEM — L97.519 DIABETIC ULCER OF BOTH FEET (HCC): Status: ACTIVE | Noted: 2021-12-29

## 2021-12-29 PROBLEM — E11.621 DIABETIC ULCER OF BOTH FEET (HCC): Status: ACTIVE | Noted: 2021-12-29

## 2021-12-29 PROBLEM — L97.529 DIABETIC ULCER OF BOTH FEET (HCC): Status: ACTIVE | Noted: 2021-12-29

## 2021-12-29 PROBLEM — E11.621 DIABETIC ULCER OF LEFT FOOT DUE TO TYPE 2 DIABETES MELLITUS (HCC): Status: ACTIVE | Noted: 2021-12-29

## 2021-12-29 PROBLEM — L97.529 DIABETIC ULCER OF LEFT FOOT DUE TO TYPE 2 DIABETES MELLITUS (HCC): Status: ACTIVE | Noted: 2021-12-29

## 2021-12-29 LAB
ANION GAP SERPL CALCULATED.3IONS-SCNC: 12 MEQ/L (ref 8–16)
BASOPHILS # BLD: 0.8 %
BASOPHILS ABSOLUTE: 0.1 THOU/MM3 (ref 0–0.1)
BUN BLDV-MCNC: 16 MG/DL (ref 7–22)
CALCIUM SERPL-MCNC: 9.9 MG/DL (ref 8.5–10.5)
CHLORIDE BLD-SCNC: 97 MEQ/L (ref 98–111)
CO2: 23 MEQ/L (ref 23–33)
CREAT SERPL-MCNC: 0.8 MG/DL (ref 0.4–1.2)
EOSINOPHIL # BLD: 5.3 %
EOSINOPHILS ABSOLUTE: 0.6 THOU/MM3 (ref 0–0.4)
ERYTHROCYTE [DISTWIDTH] IN BLOOD BY AUTOMATED COUNT: 14.7 % (ref 11.5–14.5)
ERYTHROCYTE [DISTWIDTH] IN BLOOD BY AUTOMATED COUNT: 48.4 FL (ref 35–45)
GFR SERPL CREATININE-BSD FRML MDRD: > 90 ML/MIN/1.73M2
GLUCOSE BLD-MCNC: 192 MG/DL (ref 70–108)
GLUCOSE BLD-MCNC: 282 MG/DL (ref 70–108)
HCT VFR BLD CALC: 45 % (ref 42–52)
HEMOGLOBIN: 15.5 GM/DL (ref 14–18)
IMMATURE GRANS (ABS): 0.09 THOU/MM3 (ref 0–0.07)
IMMATURE GRANULOCYTES: 0.8 %
LACTIC ACID: 2.2 MMOL/L (ref 0.5–2)
LIPASE: 232.7 U/L (ref 5.6–51.3)
LYMPHOCYTES # BLD: 22.3 %
LYMPHOCYTES ABSOLUTE: 2.7 THOU/MM3 (ref 1–4.8)
MCH RBC QN AUTO: 31.3 PG (ref 26–33)
MCHC RBC AUTO-ENTMCNC: 34.4 GM/DL (ref 32.2–35.5)
MCV RBC AUTO: 90.9 FL (ref 80–94)
MONOCYTES # BLD: 7.9 %
MONOCYTES ABSOLUTE: 0.9 THOU/MM3 (ref 0.4–1.3)
NUCLEATED RED BLOOD CELLS: 0 /100 WBC
OSMOLALITY CALCULATION: 275.9 MOSMOL/KG (ref 275–300)
PLATELET # BLD: 233 THOU/MM3 (ref 130–400)
PMV BLD AUTO: 9.1 FL (ref 9.4–12.4)
POTASSIUM SERPL-SCNC: 4.4 MEQ/L (ref 3.5–5.2)
PROCALCITONIN: < 0.02 NG/ML (ref 0.01–0.09)
RBC # BLD: 4.95 MILL/MM3 (ref 4.7–6.1)
SEG NEUTROPHILS: 62.9 %
SEGMENTED NEUTROPHILS ABSOLUTE COUNT: 7.5 THOU/MM3 (ref 1.8–7.7)
SODIUM BLD-SCNC: 132 MEQ/L (ref 135–145)
WBC # BLD: 11.9 THOU/MM3 (ref 4.8–10.8)

## 2021-12-29 PROCEDURE — 2580000003 HC RX 258: Performed by: NURSE PRACTITIONER

## 2021-12-29 PROCEDURE — 85025 COMPLETE CBC W/AUTO DIFF WBC: CPT

## 2021-12-29 PROCEDURE — 87040 BLOOD CULTURE FOR BACTERIA: CPT

## 2021-12-29 PROCEDURE — 6360000002 HC RX W HCPCS: Performed by: NURSE PRACTITIONER

## 2021-12-29 PROCEDURE — 99223 1ST HOSP IP/OBS HIGH 75: CPT | Performed by: HOSPITALIST

## 2021-12-29 PROCEDURE — 83690 ASSAY OF LIPASE: CPT

## 2021-12-29 PROCEDURE — 82948 REAGENT STRIP/BLOOD GLUCOSE: CPT

## 2021-12-29 PROCEDURE — 1200000000 HC SEMI PRIVATE

## 2021-12-29 PROCEDURE — 80048 BASIC METABOLIC PNL TOTAL CA: CPT

## 2021-12-29 PROCEDURE — 36415 COLL VENOUS BLD VENIPUNCTURE: CPT

## 2021-12-29 PROCEDURE — 99283 EMERGENCY DEPT VISIT LOW MDM: CPT

## 2021-12-29 PROCEDURE — 83605 ASSAY OF LACTIC ACID: CPT

## 2021-12-29 PROCEDURE — 96365 THER/PROPH/DIAG IV INF INIT: CPT

## 2021-12-29 PROCEDURE — 73590 X-RAY EXAM OF LOWER LEG: CPT

## 2021-12-29 PROCEDURE — 84145 PROCALCITONIN (PCT): CPT

## 2021-12-29 PROCEDURE — 93971 EXTREMITY STUDY: CPT

## 2021-12-29 RX ORDER — MAGNESIUM SULFATE IN WATER 40 MG/ML
2000 INJECTION, SOLUTION INTRAVENOUS PRN
Status: DISCONTINUED | OUTPATIENT
Start: 2021-12-29 | End: 2022-01-01 | Stop reason: HOSPADM

## 2021-12-29 RX ORDER — LEVOTHYROXINE SODIUM 0.05 MG/1
50 TABLET ORAL DAILY
Status: DISCONTINUED | OUTPATIENT
Start: 2021-12-30 | End: 2022-01-01 | Stop reason: HOSPADM

## 2021-12-29 RX ORDER — SODIUM CHLORIDE 0.9 % (FLUSH) 0.9 %
5-40 SYRINGE (ML) INJECTION EVERY 12 HOURS SCHEDULED
Status: DISCONTINUED | OUTPATIENT
Start: 2021-12-30 | End: 2022-01-01 | Stop reason: HOSPADM

## 2021-12-29 RX ORDER — POTASSIUM CHLORIDE 7.45 MG/ML
10 INJECTION INTRAVENOUS PRN
Status: DISCONTINUED | OUTPATIENT
Start: 2021-12-29 | End: 2022-01-01 | Stop reason: HOSPADM

## 2021-12-29 RX ORDER — ONDANSETRON 2 MG/ML
4 INJECTION INTRAMUSCULAR; INTRAVENOUS EVERY 6 HOURS PRN
Status: DISCONTINUED | OUTPATIENT
Start: 2021-12-29 | End: 2022-01-01 | Stop reason: HOSPADM

## 2021-12-29 RX ORDER — LINEZOLID 2 MG/ML
600 INJECTION, SOLUTION INTRAVENOUS ONCE
Status: DISCONTINUED | OUTPATIENT
Start: 2021-12-29 | End: 2021-12-30

## 2021-12-29 RX ORDER — NITROGLYCERIN 0.4 MG/1
0.4 TABLET SUBLINGUAL EVERY 5 MIN PRN
Status: DISCONTINUED | OUTPATIENT
Start: 2021-12-29 | End: 2022-01-01 | Stop reason: HOSPADM

## 2021-12-29 RX ORDER — ATORVASTATIN CALCIUM 40 MG/1
40 TABLET, FILM COATED ORAL NIGHTLY
Status: DISCONTINUED | OUTPATIENT
Start: 2021-12-30 | End: 2022-01-01 | Stop reason: HOSPADM

## 2021-12-29 RX ORDER — POTASSIUM CHLORIDE 20 MEQ/1
40 TABLET, EXTENDED RELEASE ORAL PRN
Status: DISCONTINUED | OUTPATIENT
Start: 2021-12-29 | End: 2022-01-01 | Stop reason: HOSPADM

## 2021-12-29 RX ORDER — FENOFIBRATE 160 MG/1
160 TABLET ORAL DAILY
Status: DISCONTINUED | OUTPATIENT
Start: 2021-12-30 | End: 2022-01-01 | Stop reason: HOSPADM

## 2021-12-29 RX ORDER — POLYETHYLENE GLYCOL 3350 17 G/17G
17 POWDER, FOR SOLUTION ORAL DAILY PRN
Status: DISCONTINUED | OUTPATIENT
Start: 2021-12-29 | End: 2022-01-01 | Stop reason: HOSPADM

## 2021-12-29 RX ORDER — ONDANSETRON 4 MG/1
4 TABLET, ORALLY DISINTEGRATING ORAL EVERY 8 HOURS PRN
Status: DISCONTINUED | OUTPATIENT
Start: 2021-12-29 | End: 2022-01-01 | Stop reason: HOSPADM

## 2021-12-29 RX ORDER — SODIUM CHLORIDE 9 MG/ML
25 INJECTION, SOLUTION INTRAVENOUS PRN
Status: DISCONTINUED | OUTPATIENT
Start: 2021-12-29 | End: 2022-01-01 | Stop reason: HOSPADM

## 2021-12-29 RX ORDER — ACETAMINOPHEN 325 MG/1
650 TABLET ORAL EVERY 6 HOURS PRN
Status: DISCONTINUED | OUTPATIENT
Start: 2021-12-29 | End: 2022-01-01 | Stop reason: HOSPADM

## 2021-12-29 RX ORDER — ACETAMINOPHEN 650 MG/1
650 SUPPOSITORY RECTAL EVERY 6 HOURS PRN
Status: DISCONTINUED | OUTPATIENT
Start: 2021-12-29 | End: 2022-01-01 | Stop reason: HOSPADM

## 2021-12-29 RX ORDER — ARIPIPRAZOLE 5 MG/1
5 TABLET ORAL DAILY
Status: DISCONTINUED | OUTPATIENT
Start: 2021-12-30 | End: 2022-01-01 | Stop reason: HOSPADM

## 2021-12-29 RX ORDER — 0.9 % SODIUM CHLORIDE 0.9 %
1000 INTRAVENOUS SOLUTION INTRAVENOUS ONCE
Status: COMPLETED | OUTPATIENT
Start: 2021-12-29 | End: 2021-12-29

## 2021-12-29 RX ORDER — OXYCODONE HYDROCHLORIDE AND ACETAMINOPHEN 5; 325 MG/1; MG/1
1 TABLET ORAL EVERY 6 HOURS PRN
Status: DISCONTINUED | OUTPATIENT
Start: 2021-12-29 | End: 2022-01-01 | Stop reason: HOSPADM

## 2021-12-29 RX ORDER — SODIUM CHLORIDE 0.9 % (FLUSH) 0.9 %
5-40 SYRINGE (ML) INJECTION PRN
Status: DISCONTINUED | OUTPATIENT
Start: 2021-12-29 | End: 2022-01-01 | Stop reason: HOSPADM

## 2021-12-29 RX ORDER — LISINOPRIL 5 MG/1
5 TABLET ORAL DAILY
Status: DISCONTINUED | OUTPATIENT
Start: 2021-12-30 | End: 2022-01-01 | Stop reason: HOSPADM

## 2021-12-29 RX ORDER — METOPROLOL TARTRATE 50 MG/1
50 TABLET, FILM COATED ORAL 2 TIMES DAILY
Status: DISCONTINUED | OUTPATIENT
Start: 2021-12-30 | End: 2022-01-01 | Stop reason: HOSPADM

## 2021-12-29 RX ORDER — LINEZOLID 2 MG/ML
600 INJECTION, SOLUTION INTRAVENOUS EVERY 12 HOURS
Status: DISCONTINUED | OUTPATIENT
Start: 2021-12-30 | End: 2022-01-01 | Stop reason: HOSPADM

## 2021-12-29 RX ORDER — GABAPENTIN 300 MG/1
300 CAPSULE ORAL 3 TIMES DAILY
Status: DISCONTINUED | OUTPATIENT
Start: 2021-12-30 | End: 2022-01-01 | Stop reason: HOSPADM

## 2021-12-29 RX ORDER — INSULIN GLARGINE 100 [IU]/ML
75 INJECTION, SOLUTION SUBCUTANEOUS 2 TIMES DAILY
Status: DISCONTINUED | OUTPATIENT
Start: 2021-12-30 | End: 2022-01-01 | Stop reason: HOSPADM

## 2021-12-29 RX ADMIN — CEFTRIAXONE SODIUM 1000 MG: 1 INJECTION, POWDER, FOR SOLUTION INTRAMUSCULAR; INTRAVENOUS at 22:23

## 2021-12-29 RX ADMIN — SODIUM CHLORIDE 1000 ML: 9 INJECTION, SOLUTION INTRAVENOUS at 20:00

## 2021-12-29 ASSESSMENT — PAIN DESCRIPTION - LOCATION
LOCATION: LEG
LOCATION: LEG

## 2021-12-29 ASSESSMENT — PAIN DESCRIPTION - PAIN TYPE
TYPE: ACUTE PAIN
TYPE: ACUTE PAIN

## 2021-12-29 ASSESSMENT — ENCOUNTER SYMPTOMS
VOMITING: 0
BACK PAIN: 0
EYE REDNESS: 0
COUGH: 0
CHEST TIGHTNESS: 0
ABDOMINAL PAIN: 0
NAUSEA: 0
RHINORRHEA: 0

## 2021-12-29 ASSESSMENT — PAIN - FUNCTIONAL ASSESSMENT: PAIN_FUNCTIONAL_ASSESSMENT: PREVENTS OR INTERFERES SOME ACTIVE ACTIVITIES AND ADLS

## 2021-12-29 ASSESSMENT — PAIN SCALES - GENERAL
PAINLEVEL_OUTOF10: 8
PAINLEVEL_OUTOF10: 10

## 2021-12-29 ASSESSMENT — PAIN DESCRIPTION - ORIENTATION
ORIENTATION: LEFT;LOWER
ORIENTATION: LEFT

## 2021-12-29 ASSESSMENT — PAIN DESCRIPTION - DESCRIPTORS: DESCRIPTORS: ACHING

## 2021-12-29 ASSESSMENT — PAIN DESCRIPTION - ONSET: ONSET: ON-GOING

## 2021-12-29 ASSESSMENT — PAIN DESCRIPTION - FREQUENCY: FREQUENCY: CONTINUOUS

## 2021-12-29 NOTE — Clinical Note
Patient Class: Inpatient [101]   REQUIRED: Diagnosis: Diabetic ulcer of both feet ([facility] [[id number]]   Estimated Length of Stay: Estimated stay of more than 2 midnights

## 2021-12-29 NOTE — TELEPHONE ENCOUNTER
Pt called in wanting an appt with Dr. Whitney Tobar for a leg wound. He originally wanted to come in on 12/31/21 and I advised him we are closed but I can get him in the resident clinic tomorrow afternoon. Pt states his leg is seeping and is purple. He states he will just go to ER. I advised him that is the best thing for him to do.

## 2021-12-29 NOTE — ED NOTES
Patient to the ED with complaint of leg swelling and wound. Patient states that he has had a worsening wound on left lower extremity with drainage for past few days. Patient states that this is painful and he has noticed purulent drainage. He denies fevers or chills. Patient's respirations are easy and unlabored. No distress noted.       Noel Alegre RN  12/29/21 0834

## 2021-12-30 PROBLEM — E11.622 DIABETIC ULCER OF LEFT LOWER LEG ASSOCIATED WITH TYPE 2 DIABETES MELLITUS (HCC): Status: ACTIVE | Noted: 2021-12-29

## 2021-12-30 PROBLEM — L97.929 DIABETIC ULCER OF LEFT LOWER LEG ASSOCIATED WITH TYPE 2 DIABETES MELLITUS (HCC): Status: ACTIVE | Noted: 2021-12-29

## 2021-12-30 LAB
ANION GAP SERPL CALCULATED.3IONS-SCNC: 14 MEQ/L (ref 8–16)
BACTERIA: ABNORMAL /HPF
BASOPHILS # BLD: 0.5 %
BASOPHILS ABSOLUTE: 0.1 THOU/MM3 (ref 0–0.1)
BILIRUBIN URINE: NEGATIVE
BLOOD, URINE: NEGATIVE
BUN BLDV-MCNC: 16 MG/DL (ref 7–22)
CALCIUM SERPL-MCNC: 9 MG/DL (ref 8.5–10.5)
CASTS 2: ABNORMAL /LPF
CASTS UA: ABNORMAL /LPF
CHARACTER, URINE: CLEAR
CHLORIDE BLD-SCNC: 99 MEQ/L (ref 98–111)
CO2: 22 MEQ/L (ref 23–33)
COLOR: YELLOW
CREAT SERPL-MCNC: 0.9 MG/DL (ref 0.4–1.2)
CRYSTALS, UA: ABNORMAL
EOSINOPHIL # BLD: 5.3 %
EOSINOPHILS ABSOLUTE: 0.6 THOU/MM3 (ref 0–0.4)
EPITHELIAL CELLS, UA: ABNORMAL /HPF
ERYTHROCYTE [DISTWIDTH] IN BLOOD BY AUTOMATED COUNT: 14.6 % (ref 11.5–14.5)
ERYTHROCYTE [DISTWIDTH] IN BLOOD BY AUTOMATED COUNT: 49.2 FL (ref 35–45)
GFR SERPL CREATININE-BSD FRML MDRD: 88 ML/MIN/1.73M2
GLUCOSE BLD-MCNC: 256 MG/DL (ref 70–108)
GLUCOSE BLD-MCNC: 258 MG/DL (ref 70–108)
GLUCOSE BLD-MCNC: 266 MG/DL (ref 70–108)
GLUCOSE BLD-MCNC: 299 MG/DL (ref 70–108)
GLUCOSE BLD-MCNC: 320 MG/DL (ref 70–108)
GLUCOSE URINE: >= 1000 MG/DL
HCT VFR BLD CALC: 38 % (ref 42–52)
HEMOGLOBIN: 13.1 GM/DL (ref 14–18)
IMMATURE GRANS (ABS): 0.07 THOU/MM3 (ref 0–0.07)
IMMATURE GRANULOCYTES: 0.6 %
KETONES, URINE: NEGATIVE
LACTIC ACID: 1.9 MMOL/L (ref 0.5–2)
LACTIC ACID: 2.4 MMOL/L (ref 0.5–2)
LEUKOCYTE ESTERASE, URINE: NEGATIVE
LYMPHOCYTES # BLD: 22.2 %
LYMPHOCYTES ABSOLUTE: 2.5 THOU/MM3 (ref 1–4.8)
MCH RBC QN AUTO: 31.6 PG (ref 26–33)
MCHC RBC AUTO-ENTMCNC: 34.5 GM/DL (ref 32.2–35.5)
MCV RBC AUTO: 91.6 FL (ref 80–94)
MISCELLANEOUS 2: ABNORMAL
MONOCYTES # BLD: 8.8 %
MONOCYTES ABSOLUTE: 1 THOU/MM3 (ref 0.4–1.3)
NITRITE, URINE: NEGATIVE
NUCLEATED RED BLOOD CELLS: 0 /100 WBC
PH UA: 5 (ref 5–9)
PLATELET # BLD: 208 THOU/MM3 (ref 130–400)
PMV BLD AUTO: 9.6 FL (ref 9.4–12.4)
POTASSIUM REFLEX MAGNESIUM: 4.1 MEQ/L (ref 3.5–5.2)
PROTEIN UA: ABNORMAL
RBC # BLD: 4.15 MILL/MM3 (ref 4.7–6.1)
RBC URINE: ABNORMAL /HPF
RENAL EPITHELIAL, UA: ABNORMAL
SEG NEUTROPHILS: 62.6 %
SEGMENTED NEUTROPHILS ABSOLUTE COUNT: 7 THOU/MM3 (ref 1.8–7.7)
SODIUM BLD-SCNC: 135 MEQ/L (ref 135–145)
SPECIFIC GRAVITY, URINE: 1.02 (ref 1–1.03)
UROBILINOGEN, URINE: 0.2 EU/DL (ref 0–1)
WBC # BLD: 11.2 THOU/MM3 (ref 4.8–10.8)
WBC UA: ABNORMAL /HPF
YEAST: ABNORMAL

## 2021-12-30 PROCEDURE — 2580000003 HC RX 258: Performed by: HOSPITALIST

## 2021-12-30 PROCEDURE — 2580000003 HC RX 258: Performed by: PHYSICIAN ASSISTANT

## 2021-12-30 PROCEDURE — 36415 COLL VENOUS BLD VENIPUNCTURE: CPT

## 2021-12-30 PROCEDURE — 6360000002 HC RX W HCPCS: Performed by: HOSPITALIST

## 2021-12-30 PROCEDURE — 6370000000 HC RX 637 (ALT 250 FOR IP): Performed by: HOSPITALIST

## 2021-12-30 PROCEDURE — 99221 1ST HOSP IP/OBS SF/LOW 40: CPT | Performed by: SURGERY

## 2021-12-30 PROCEDURE — 2580000003 HC RX 258: Performed by: INTERNAL MEDICINE

## 2021-12-30 PROCEDURE — 81001 URINALYSIS AUTO W/SCOPE: CPT

## 2021-12-30 PROCEDURE — 82948 REAGENT STRIP/BLOOD GLUCOSE: CPT

## 2021-12-30 PROCEDURE — 80048 BASIC METABOLIC PNL TOTAL CA: CPT

## 2021-12-30 PROCEDURE — 99232 SBSQ HOSP IP/OBS MODERATE 35: CPT | Performed by: PHYSICIAN ASSISTANT

## 2021-12-30 PROCEDURE — 6360000002 HC RX W HCPCS: Performed by: INTERNAL MEDICINE

## 2021-12-30 PROCEDURE — 1200000000 HC SEMI PRIVATE

## 2021-12-30 PROCEDURE — 83605 ASSAY OF LACTIC ACID: CPT

## 2021-12-30 PROCEDURE — 85025 COMPLETE CBC W/AUTO DIFF WBC: CPT

## 2021-12-30 RX ORDER — NICOTINE POLACRILEX 4 MG
15 LOZENGE BUCCAL PRN
Status: DISCONTINUED | OUTPATIENT
Start: 2021-12-30 | End: 2022-01-01 | Stop reason: HOSPADM

## 2021-12-30 RX ORDER — DEXTROSE MONOHYDRATE 25 G/50ML
12.5 INJECTION, SOLUTION INTRAVENOUS PRN
Status: DISCONTINUED | OUTPATIENT
Start: 2021-12-30 | End: 2022-01-01 | Stop reason: HOSPADM

## 2021-12-30 RX ORDER — 0.9 % SODIUM CHLORIDE 0.9 %
1000 INTRAVENOUS SOLUTION INTRAVENOUS ONCE
Status: COMPLETED | OUTPATIENT
Start: 2021-12-30 | End: 2021-12-30

## 2021-12-30 RX ORDER — DEXTROSE MONOHYDRATE 50 MG/ML
100 INJECTION, SOLUTION INTRAVENOUS PRN
Status: DISCONTINUED | OUTPATIENT
Start: 2021-12-30 | End: 2022-01-01 | Stop reason: HOSPADM

## 2021-12-30 RX ADMIN — ARIPIPRAZOLE 5 MG: 5 TABLET ORAL at 08:33

## 2021-12-30 RX ADMIN — GABAPENTIN 300 MG: 300 CAPSULE ORAL at 21:14

## 2021-12-30 RX ADMIN — ENOXAPARIN SODIUM 40 MG: 100 INJECTION SUBCUTANEOUS at 08:33

## 2021-12-30 RX ADMIN — INSULIN LISPRO 6 UNITS: 100 INJECTION, SOLUTION INTRAVENOUS; SUBCUTANEOUS at 17:50

## 2021-12-30 RX ADMIN — LISINOPRIL 5 MG: 5 TABLET ORAL at 08:33

## 2021-12-30 RX ADMIN — INSULIN GLARGINE 75 UNITS: 100 INJECTION, SOLUTION SUBCUTANEOUS at 09:41

## 2021-12-30 RX ADMIN — GABAPENTIN 300 MG: 300 CAPSULE ORAL at 08:33

## 2021-12-30 RX ADMIN — ATORVASTATIN CALCIUM 40 MG: 40 TABLET, FILM COATED ORAL at 03:18

## 2021-12-30 RX ADMIN — FENOFIBRATE 160 MG: 160 TABLET ORAL at 08:33

## 2021-12-30 RX ADMIN — LINEZOLID 600 MG: 600 INJECTION, SOLUTION INTRAVENOUS at 03:25

## 2021-12-30 RX ADMIN — GABAPENTIN 300 MG: 300 CAPSULE ORAL at 03:18

## 2021-12-30 RX ADMIN — METOPROLOL TARTRATE 50 MG: 50 TABLET, FILM COATED ORAL at 21:14

## 2021-12-30 RX ADMIN — ATORVASTATIN CALCIUM 40 MG: 40 TABLET, FILM COATED ORAL at 21:14

## 2021-12-30 RX ADMIN — LINEZOLID 600 MG: 600 INJECTION, SOLUTION INTRAVENOUS at 13:04

## 2021-12-30 RX ADMIN — INSULIN LISPRO 6 UNITS: 100 INJECTION, SOLUTION INTRAVENOUS; SUBCUTANEOUS at 09:41

## 2021-12-30 RX ADMIN — INSULIN LISPRO 6 UNITS: 100 INJECTION, SOLUTION INTRAVENOUS; SUBCUTANEOUS at 12:59

## 2021-12-30 RX ADMIN — CEFTRIAXONE SODIUM 1000 MG: 1 INJECTION, POWDER, FOR SOLUTION INTRAMUSCULAR; INTRAVENOUS at 17:53

## 2021-12-30 RX ADMIN — METOPROLOL TARTRATE 50 MG: 50 TABLET, FILM COATED ORAL at 03:18

## 2021-12-30 RX ADMIN — LEVOTHYROXINE SODIUM 50 MCG: 0.05 TABLET ORAL at 05:49

## 2021-12-30 RX ADMIN — SODIUM CHLORIDE, PRESERVATIVE FREE 10 ML: 5 INJECTION INTRAVENOUS at 08:33

## 2021-12-30 RX ADMIN — SODIUM CHLORIDE 1000 ML: 9 INJECTION, SOLUTION INTRAVENOUS at 18:39

## 2021-12-30 RX ADMIN — INSULIN GLARGINE 75 UNITS: 100 INJECTION, SOLUTION SUBCUTANEOUS at 21:14

## 2021-12-30 RX ADMIN — METOPROLOL TARTRATE 50 MG: 50 TABLET, FILM COATED ORAL at 08:33

## 2021-12-30 RX ADMIN — GABAPENTIN 300 MG: 300 CAPSULE ORAL at 13:04

## 2021-12-30 ASSESSMENT — ENCOUNTER SYMPTOMS
COUGH: 0
EYE ITCHING: 0
WHEEZING: 0
GASTROINTESTINAL NEGATIVE: 1
SINUS PRESSURE: 0
VOMITING: 0
CONSTIPATION: 0
RHINORRHEA: 0
DIARRHEA: 0
EYES NEGATIVE: 1
RESPIRATORY NEGATIVE: 1
ABDOMINAL PAIN: 0
COLOR CHANGE: 1
SHORTNESS OF BREATH: 0
BACK PAIN: 0
SINUS PAIN: 0
ABDOMINAL DISTENTION: 0
NAUSEA: 0
SORE THROAT: 0

## 2021-12-30 ASSESSMENT — PAIN DESCRIPTION - LOCATION
LOCATION: LEG
LOCATION: LEG

## 2021-12-30 ASSESSMENT — PAIN DESCRIPTION - PAIN TYPE
TYPE: ACUTE PAIN
TYPE: ACUTE PAIN

## 2021-12-30 ASSESSMENT — PAIN DESCRIPTION - FREQUENCY: FREQUENCY: CONTINUOUS

## 2021-12-30 ASSESSMENT — PAIN DESCRIPTION - PROGRESSION: CLINICAL_PROGRESSION: NOT CHANGED

## 2021-12-30 ASSESSMENT — PAIN SCALES - GENERAL
PAINLEVEL_OUTOF10: 8
PAINLEVEL_OUTOF10: 8

## 2021-12-30 ASSESSMENT — PAIN - FUNCTIONAL ASSESSMENT: PAIN_FUNCTIONAL_ASSESSMENT: PREVENTS OR INTERFERES SOME ACTIVE ACTIVITIES AND ADLS

## 2021-12-30 ASSESSMENT — PAIN DESCRIPTION - ONSET: ONSET: ON-GOING

## 2021-12-30 ASSESSMENT — PAIN DESCRIPTION - ORIENTATION
ORIENTATION: LEFT
ORIENTATION: LEFT

## 2021-12-30 ASSESSMENT — PAIN DESCRIPTION - DESCRIPTORS: DESCRIPTORS: SHOOTING

## 2021-12-30 NOTE — H&P
History & Physical        Patient:  Lynette Zazueta  YOB: 1970    MRN: [id number]     Acct: [de-identified]    PCP: Moris Wells MD    Date of Admission: 12/29/2021    Date of Service: Pt seen/examined on 12/29/21  [facility] greater than two midnights due to medical therapy. Chief Complaint: Left leg wound with drainage times several days      History Of Present Illness:    46 y.o. male with medical history significant for diabetes type 2, diabetic neuropathy, GERD, bipolar disorder, primary hypertension; who presented to [address] [place] [address] with left leg wound with drainage times several days. Patient states that he is unsure when wound appeared on his legs. Patient states he believes it was in the past few days. Patient denies any trauma to the leg. Patient states that on the day of admission, he noted significant drainage from the wound; which is what prompted him to come to the hospital.  No fever, no chills, no nausea, no vomiting, no URI or UTI type symptoms, no chest pain, no shortness of breath, no headache, no dizziness, no diarrhea, no constipation. 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 Past Medical History:          Diagnosis Date    Bipolar 2 disorder [facility])     previously followed with Dr. Tatum Zuniga and Kirsten Leblanc in [facility] BPH (benign prostatic hyperplasia)     Diabetes mellitus type 2, uncontrolled ([address]     HgbA1c on 4/2/2019 was 9.1.     Diabetic peripheral neuropathy ([address]     Diabetic polyneuropathy ([address]     Diabetic ulcer of right foot associated with type 2 diabetes mellitus ([address] 12/10/2015    Essential hypertension     \"never been on b/p medication that I know of\"    GERD (gastroesophageal reflux disease)     Hammer toe of left foot     Heart murmur     denies any chest pain or palpitations    History of tobacco abuse     Hx of AKA (above knee amputation), right ([facility] 04/18/2019    Dr. Alonso Chain edema, diabetic, bilateral ([facility] 05/04/2018    Dr. Laura Murray referred to retina specialist for 2nd opinion    Marijuana abuse in remission     Melena     MRSA (methicillin resistant staph aureus) culture positive     Onychomycosis     Other disorders of kidney and ureter in diseases classified elsewhere     Sleep apnea     no cpap    Thyroid disease     WD-Skin ulcer of fourth toe of right foot with necrosis of bone ([facility] 6/29/2016       Past Surgical History:          Procedure Laterality Date    ABSCESS DRAINAGE Right     foot    AMPUTATION ABOVE KNEE Right 4/18/2019    RIGHT ABOVE KNEE AMPUTATION performed by Jesu Wade MD at [address] [facility], LAPAROSCOPIC N/A 4/1/2020    ROBOTIC CHOLECYSTECTOMY performed by Raymon Pizano MD at [address] [facility] N/A 7/20/2020    CYSTOSCOPY performed by Penny Peña MD at [address] [facility] 8/21/2020    CYSTOSCOPY, UROLIFT performed by Penny Peña MD at [address] [facility] [address], COLON, DIAGNOSTIC     [address] Right 07/01/2016    I & D    GASTROCNEMIUS RECESSION Left 11/12/2021    LEFT LEG GASTROCS RECESSION performed by Tania You MD at [address] [facility] [facility] Right 2/18/2019    I&D RIGHT STUMP performed by Jesu Wade MD at [address] [facility] Right 07/20/2016    LEG AMPUTATION BELOW KNEE Right 4/4/2019    I&D AND REVISION OF AMPUTATION RIGHT LEG performed by Jesu Wade MD at [address] [facility] Right 1/14/15    sole of foot I&D    DE DRAIN INFECT SHOULDER BURSA Left 8/18/2017    LEFT SHOULDER INCISION AND DRAINAGE performed by Jesu Wade MD at [address] [facility] OFFICE/OUTPT [facility] Right 9/20/2018    EXCISIONAL DEBRIDEMENT RIGHT BKA STUMP performed by Johanny Lopez MD at [address] Right 1/16/15    2nd toe with wound vac applied    UPPER GASTROINTESTINAL ENDOSCOPY N/A 3/3/2020    EGD DILATION SAVORY performed by Saul Birmingham MD at [facility]  ? when       Medications Prior to Admission:      Prior to Admission medications    Medication Sig Start Date End Date Taking? Authorizing Provider   oxyCODONE-acetaminophen (PERCOCET) 5-325 MG per tablet Take 1 tablet by mouth every 6 hours as needed. 11/17/21   Historical Provider, MD   Insulin Disposable Pump (OMNIPOD DASH 5 PACK PODS) MISC CHANGE PODS EVERY 48 HOURS AS DIRECTED  Patient not taking: Reported on 12/21/2021 11/29/21   Gen Miller MD   Insulin Pen Needle (PEN NEEDLES) 31G X 8 MM MISC For use with insulin injections five timed per day dx:E11.9 11/9/21   Gen Miller MD   gabapentin (NEURONTIN) 300 MG capsule TAKE 1 CAPSULE BY MOUTH THREE TIMES DAILY. 11/3/21 2/2/22  Phuc Ortega MD   insulin lispro (HUMALOG KWIKPEN U-200) 200 UNIT/ML SOPN pen 40 units Plus scale - Max dose 160 units per day 11/3/21   Gen Miller MD   nitroGLYCERIN (NITROSTAT) 0.3 MG SL tablet Place 1 tablet under the tongue every 5 minutes as needed for Chest pain up to max of 3 total doses.  If no relief after 1 dose, call 911. 8/16/21   Phuc Ortega MD   empagliflozin (JARDIANCE) 25 MG tablet Take 1 tablet by mouth daily New dose 8/6/21   Phuc Ortega MD   Calcium-Phosphorus-Vitamin D (CALCIUM/D3 ADULT GUMMIES PO) Take 2 tablets by mouth daily    Historical Provider, MD   sertraline (ZOLOFT) 50 MG tablet Take 50 mg by mouth daily    Historical Provider, MD   Multiple Vitamins-Minerals (THERAPEUTIC MULTIVITAMIN-MINERALS) tablet Take 1 tablet by mouth daily    Historical Provider, MD   atorvastatin (LIPITOR) 40 MG tablet Take 1 tablet by mouth nightly 7/28/21   Corinne Macias MD   fenofibrate (TRICOR) 145 MG tablet Take 1 tablet by mouth daily 7/28/21   Corinne Macias MD   levothyroxine (SYNTHROID) 50 MCG tablet Take 1 tablet by mouth daily 7/28/21   Mayte Napier MD   metoprolol tartrate (LOPRESSOR) 50 MG tablet Take 1 tablet by mouth 2 times daily 7/28/21   Mayte Napier MD   Insulin Glargine, 2 Unit Dial, 300 UNIT/ML SOPN Change to 75 units BID and every 10 days increase by 5 units both times, to get am sugars 150-200  Patient taking differently: Change to 80 units BID and every 10 days increase by 5 units both times, to get am sugars 150-200 6/29/21   Phuc rOtega MD   Continuous Blood Gluc Transmit (DEXCOM G6 TRANSMITTER) MISC 1 Device by Does not apply route every 3 months 3/22/21   MARILYN Benavidez CNP   Continuous Blood Gluc Sensor (DEXCOM G6 SENSOR) MISC 1 Device by Does not apply route every 14 days 3/22/21   MARILYN Benavidez CNP   Continuous Blood Gluc  (DEXCOM G6 ) LIANNE 1 Device by Does not apply route 4 times daily (before meals and nightly) 3/22/21   MARILYN Benavidez CNP   lisinopril (PRINIVIL;ZESTRIL) 5 MG tablet Take 1 tablet by mouth daily 3/17/21   MARILYN Benavidez CNP   ARIPiprazole (ABILIFY) 5 MG tablet Take 5 mg by mouth daily  12/16/20   Historical Provider, MD       Allergies:  Pcn [penicillins], Actos [pioglitazone], Clindamycin/lincomycin, Vancomycin, and Metformin and related    Social History:      The patient currently lives home    TOBACCO:  reports that he has been smoking cigars. He started smoking about 36 years ago. He has been smoking about 0.00 packs per day for the past 10.00 years. He has never used smokeless tobacco.  ETOH:   reports previous alcohol use. Family History:     Reviewed in detail and negativefor DM, CAD, Cancer, CVA.  Positive as follows:        Problem Relation Age of Onset    Diabetes Mother     Other Mother         pneumonia, H1N1    Depression Mother     Early Death Mother     High Blood Pressure Mother     High Cholesterol Mother     Vision Loss Maternal Grandmother     Arthritis Maternal Grandfather     Heart Disease Maternal Grandfather        REVIEW OFSYSTEMS:   Pertinent positives as noted in the HPI. All other systems reviewed and negative. Review of Systems   Constitutional: Negative for activity change, appetite change, chills, fatigue and fever. HENT: Negative for congestion, ear pain, postnasal drip, rhinorrhea, sinus pressure, sinus pain, sneezing and sore throat. Eyes: Negative for itching. Respiratory: Negative for cough, shortness of breath and wheezing. Cardiovascular: Negative for chest pain and leg swelling. Gastrointestinal: Negative for abdominal distention, abdominal pain, constipation, diarrhea, nausea and vomiting. Genitourinary: Negative for difficulty urinating, dysuria, flank pain, frequency, hematuria and urgency. Musculoskeletal: Negative for arthralgias, back pain and myalgias. Skin: Positive for color change and wound. Drainage from left leg wound   Neurological: Negative for dizziness and headaches. PHYSICAL EXAM:    BP (!) 154/87   Pulse 92   Temp 97.8 °F (36.6 °C)   Resp 18   Wt 223 lb (101.2 kg)   SpO2 99%   BMI 35.99 kg/m²   Physical Exam  Constitutional:       Appearance: He is obese. Comments: Asleep but arousable   HENT:      Head: Normocephalic and atraumatic. Right Ear: External ear normal.      Left Ear: External ear normal.      Nose: Nose normal.      Mouth/Throat:      Pharynx: Oropharynx is clear. Eyes:      Extraocular Movements: Extraocular movements intact. Pupils: Pupils are equal, round, and reactive to light. Cardiovascular:      Rate and Rhythm: Normal rate and regular rhythm. Pulses: Normal pulses. Heart sounds: Normal heart sounds. No murmur heard. No friction rub. No gallop. Pulmonary:      Effort: Pulmonary effort is normal. No respiratory distress. Breath sounds: Normal breath sounds. No wheezing, rhonchi or rales.    Abdominal:      General: Bowel sounds are normal.   Musculoskeletal:         General: Normal range of motion. Cervical back: Normal range of motion and neck supple. Comments: Right AKA. Dressing to left leg. Skin:     General: Skin is warm. Neurological:      General: No focal deficit present. Labs:     Recent Labs     12/29/21 1928   WBC 11.9*   HGB 15.5   HCT 45.0        Recent Labs     12/29/21 1928   *   K 4.4   CL 97*   CO2 23   BUN 16   CREATININE 0.8   CALCIUM 9.9     No results for input(s): AST, ALT, BILIDIR, BILITOT, ALKPHOS in the last 72 hours. No results for input(s): INR in the last 72 hours. No results for input(s): Earnie Lent in the last 72 hours. Urinalysis:   Lab Results   Component Value Date    NITRU NEGATIVE 07/27/2021    WBCUA 0-2 07/27/2021    BACTERIA NONE SEEN 07/27/2021    RBCUA 0-2 07/27/2021    BLOODU NEGATIVE 07/27/2021    SPECGRAV 1.025 07/27/2021    GLUCOSEU 500 02/08/2021       Intake & Output:  No intake/output data recorded. No intake/output data recorded. Radiology:       VL DUP LOWER EXTREMITY VENOUS LEFT   Final Result   Normal venous ultrasound. No evidence for acute deep venous thrombosis. **This report has been created using voice recognition software. It may contain minor errors which are inherent in voice recognition technology. **      Final report electronically signed by Dr. Pedro Maddox on 12/29/2021 8:38 PM      XR TIBIA FIBULA LEFT (2 VIEWS)   Final Result   Focal soft tissue swelling overlies the distal tibial shaft anteriorly. No fracture. No bone destruction. **This report has been created using voice recognition software. It may contain minor errors which are inherent in voice recognition technology. **      Final report electronically signed by Dr. Pedro Maddox on 12/29/2021 7:36 PM               DVT prophylaxis: [x] Lovenox                                 [] SCDs                                 [] SQ Heparin                                 [] Encourage ambulation [] Already on Anticoagulation    Code Status: Full Code      PT/OT Eval Status: None ordered    Disposition:    [x] Home       [] TCU       [] Rehab       [] Psych       [] SNF       [] Long Term CareFacility       [] Other-    ASSESSMENT:    Active Hospital Problems    Diagnosis Date Noted    Diabetic ulcer of left lower leg associated with type 2 diabetes mellitus ([facility] [X97.408, L97.929] 12/29/2021     Priority: High    Type 2 diabetes mellitus with hyperglycemia, with long-term current use of insulin ([facility] [E11.65, Z79.4] 02/15/2019     Priority: Medium    Hyponatremia [E87.1]      Priority: Medium    Neuropathy [G62.9] 01/22/2021     Priority: Low    Hyperlipidemia LDL goal <70 [E78.5] 01/22/2021     Priority: Low    Anxiety and depression [F41.9, F32.A] 06/18/2020     Priority: Low    Bipolar 1 disorder ([facility] [F31.9]      Priority: Low       PLAN:    1. Diabetic ulcer to left lower leg-patient states he believes he got a wound to his left lower leg in the past few days. Patient is unsure when it actually happened or how it happened. Patient notes that on the day of admission he had significant drainage. Patient started on Zyvox in the ER secondary to possible reaction to vancomycin in the past. Patient be continued on Zyvox. We will follow-up blood cultures. We will get a surgical consult for possible debridement. We will get a wound care consult for continued wound care. 2. Type 2 diabetes-patient with history of type 2 diabetes. Patient noted to have a hemoglobin A1c of 8.4 on 10/29/2021. Patient will be continued on his home diabetic medication with his long-acting insulin. Patient placed on an insulin sliding scale. Accu-Cheks as scheduled. 3. Hyponatremia-patient noted to have slightly low sodium. Patient has been noted to have low sodium in the past. We will continue to monitor during hospitalization. 4. Hyperlipidemia-patient with history of hyperlipidemia.  Patient be continued on his home statin and fenofibrate. 5. Anxiety and depression-patient with history of anxiety and depression. Patient will be continued on Abilify. Zoloft will be held secondary to possible risk of serotonin syndrome with use of Zyvox. 6. Hypothyroidism-patient with history of hypothyroidism. Patient will be continued on his home Synthroid at his home dose. 7. Primary hypertension-patient with history of hypertension. Patient will be continued on lisinopril and Lopressor. 8. Diabetic neuropathy-patient with history of diabetic neuropathy. Patient will be continued on gabapentin. Thank you Rissa Huffman MD for the opportunity to be involved in this patient's care.     Electronically signed by Jr Bernal MD on 12/29/2021 at 10:45 PM

## 2021-12-30 NOTE — PROGRESS NOTES
Hospitalist Progress Note    Patient:  Joyce Randolph    Unit/Bed:5K-24/024-A  YOB: 1970  MRN: [id number]   Acct: [de-identified]   PCP: Lionel Garrison MD  Code Status: Full Code  Date of Admission: 12/29/2021    Expected Discharge: 1-2 days  Disposition: Home. Assessment/Plan:    1. Left lower leg wound:   - Gen surg was consulted on admission; no areas of necrosis or abscess to drain. No surgical intervention at this time. Consult wound ostomy.   - Pt started on Zyvox on admission, home sertraline held. Previous MRSA screen negative. Consult ID further ABX recommendations. 2. IDDMII: Resume home Lantus 75U BID plus SSI. Accu-checks. Hypoglycemia protocol. 3. Essential HTN: BP stable, continue home meds. 4. Anxiety/depression: Home sertraline held while on Zyvox. May resume if abx are changed per ID.     5. Hypothryoidism: Resume home synthroid. 6. Diabetic neuropathy: cont home gabapentin. 7. HLD: resume statin and fenofibrate. Chief Complaint: Left leg wound with drainage times several days    HPI / Hospital Course: Per HPI: \"44 y.o. male with medical history significant for diabetes type 2, diabetic neuropathy, GERD, bipolar disorder, primary hypertension; who presented to [facility] with left leg wound with drainage times several days. Patient states that he is unsure when wound appeared on his legs. Patient states he believes it was in the past few days. Patient denies any trauma to the leg. Patient states that on the day of admission, he noted significant drainage from the wound; which is what prompted him to come to the hospital.  No fever, no chills, no nausea, no vomiting, no URI or UTI type symptoms, no chest pain, no shortness of breath, no headache, no dizziness, no diarrhea, no constipation.   Patient reports that in the past, he had a wound to his right leg which ended up with him having an AKA secondary to not being able to salvage auscultation. No retractions  Cardiac: RRR without murmur. Abdomen: soft. Nontender. Bowel sounds positive. Extremities:  No clubbing, cyanosis, or edema x 4. Right AKA. Vasculature: capillary refill < 3 seconds. Palpable LE pulses bilaterally. Skin:  warm and dry. Psych:  Alert and oriented x3. Affect appropriate  Lymph:  No supraclavicular adenopathy. Neurologic:  No focal deficit. No seizures. Data: (All radiographs, tracings, PFTs, and imaging are personally viewed and interpreted unless otherwise noted)  Labs:   Recent Labs     12/29/21 1928 12/30/21  0505   WBC 11.9* 11.2*   HGB 15.5 13.1*   HCT 45.0 38.0*    208     Recent Labs     12/29/21 1928 12/30/21  0505   * 135   K 4.4 4.1   CL 97* 99   CO2 23 22*   BUN 16 16   CREATININE 0.8 0.9   CALCIUM 9.9 9.0     No results for input(s): AST, ALT, BILIDIR, BILITOT, ALKPHOS in the last 72 hours. No results for input(s): INR in the last 72 hours. No results for input(s): Kim Brooks in the last 72 hours. Urinalysis:   Lab Results   Component Value Date    NITRU NEGATIVE 12/30/2021    WBCUA 0-2 12/30/2021    BACTERIA NONE SEEN 12/30/2021    RBCUA 0-2 12/30/2021    BLOODU NEGATIVE 12/30/2021    SPECGRAV 1.025 07/27/2021    GLUCOSEU >= 1000 12/30/2021     Urine culture: No results found for: Christina Trevino  Micro:   Blood culture #1:   Lab Results   Component Value Date    BC No growth-preliminary  12/29/2021     Blood culture #2:No results found for: Bella Kebede  Organism:  Lab Results   Component Value Date    ORG Staphylococcus aureus 11/30/2020         Lab Results   Component Value Date    LABGRAM  11/30/2020     No segmented neutrophils observed. Rare epithelial cells observed. Few gram positive cocci occurring singly and in pairs.       MRSA culture only:No results found for: [facility]  Respiratory culture: No results found for: CULTRESP  Aerobic and Anaerobic :  Lab Results   Component Value Date    LABAERO  11/30/2020     moderate growth In the treatment of gram positive infections, GENTAMICIN should be CONSIDERED a SYNERGYSTIC agent ONLY. Ciprofloxacin and Levofloxacin, regardless of in vitrosensitivity, should not be used for staphylococcal infectionsother than uncomplicated lower UTIs. Moxifloxacin, regardless of in vitro sensitivity, shouldnot be used for staphylococcal infections. Lab Results   Component Value Date    LABANAE  11/30/2020     No growth-preliminary No anaerobes isolated- preliminary No anaerobes isolated        Radiology Reports:  VL DUP LOWER EXTREMITY VENOUS LEFT   Final Result   Normal venous ultrasound. No evidence for acute deep venous thrombosis. **This report has been created using voice recognition software. It may contain minor errors which are inherent in voice recognition technology. **      Final report electronically signed by Dr. Paulette Espinosa on 12/29/2021 8:38 PM      XR TIBIA FIBULA LEFT (2 VIEWS)   Final Result   Focal soft tissue swelling overlies the distal tibial shaft anteriorly. No fracture. No bone destruction. **This report has been created using voice recognition software. It may contain minor errors which are inherent in voice recognition technology. **      Final report electronically signed by Dr. Paulette Espinosa on 12/29/2021 7:36 PM        XR TIBIA FIBULA LEFT (2 VIEWS)    Result Date: 12/29/2021  PROCEDURE: XR TIBIA FIBULA LEFT (2 VIEWS) CLINICAL INFORMATION: NKI, wound on anterior aspect of pts left tib/fib COMPARISON: 10/2/2020 TECHNIQUE: PA and lateral views of the left tibia and fibula were obtained FINDINGS: No fracture or dislocation is seen. No bone destruction is seen. There is mild focal soft tissue swelling overlying the distal tibial shaft anteriorly. There are large Achilles and plantar calcaneal spurs. Focal soft tissue swelling overlies the distal tibial shaft anteriorly. No fracture. No bone destruction.  **This report has been created using voice recognition software. It may contain minor errors which are inherent in voice recognition technology. ** Final report electronically signed by Dr. Caron العراقي on 12/29/2021 7:36 PM    VL DUP LOWER EXTREMITY VENOUS LEFT    Result Date: 12/29/2021  PROCEDURE: VL DUP LOWER EXTREMITY VENOUS LEFT CLINICAL INFORMATION: swelling, pain, wound COMPARISON: 12/3/2021 TECHNIQUE: Multiple grayscale and color flow images of the major veins of the left lower extremity were obtained from the level of the groin to the level of the ankle. Multiple compression and augmentation maneuvers were performed and spectral Doppler waveforms were generated. . A few images of the contralateral common femoral vein were also obtained. FINDINGS:   All the  deep veins  are widely patent with normal flow and normal compressibility. .   The contralateral common femoral vein is unremarkable. Normal venous ultrasound. No evidence for acute deep venous thrombosis. **This report has been created using voice recognition software. It may contain minor errors which are inherent in voice recognition technology. ** Final report electronically signed by Dr. Caron العراقي on 12/29/2021 8:38 PM        Tele:   [] yes             [x] no      Active Hospital Problems    Diagnosis Date Noted    Diabetic ulcer of left lower leg associated with type 2 diabetes mellitus ([facility] [L68.967, L97.929] 12/29/2021    Neuropathy [G62.9] 01/22/2021    Hyperlipidemia LDL goal <70 [E78.5] 01/22/2021    Anxiety and depression [F41.9, F32.A] 06/18/2020    Type 2 diabetes mellitus with hyperglycemia, with long-term current use of insulin ([facility] [E11.65, Z79.4] 02/15/2019    Hyponatremia [E87.1]     Bipolar 1 disorder ([facility] [F31.9]        Electronically signed by Lars Arango PA-C on 12/30/2021 at 9:51 AM

## 2021-12-30 NOTE — ED NOTES
ED to inpatient nurses report    No chief complaint on file.      Present to ED from home  LOC: alert and orientated to name, place, date  Vital signs   Vitals:    12/29/21 1711 12/29/21 1713 12/29/21 2225   BP:  (!) 154/87    Pulse: 93  92   Resp:  18 18   Temp: 97.8 °F (36.6 °C)     SpO2: 99%  99%   Weight: 223 lb (101.2 kg)        Oxygen Baseline: No oxygen at baseline    Current needs required: NONE    LDAs:   Peripheral IV 12/29/21 Right Hand (Active)     Mobility: Requires assistance * 1  Pending ED orders: Ledy Rich  Present condition: stable, ready for transport      Electronically signed by Itzel Morocho RN on 12/29/2021 at 11:39 PM       Itzel Morocho RN  12/29/21 4053

## 2021-12-30 NOTE — CONSULTS
CONSULTATION NOTE :ID       Patient - Irasema Anderson,  Age - 46 y.o.    - 1970      Room Number - 4X-86/235-M   MRN -  [id number]   Acct # - [de-identified]  Date of Admission -  2021  5:14 PM  Patient's PCP: José Antonio Morgan MD     Requesting Physician: Mehul Cooper PA-C    REASON FOR CONSULTATION   Left lower leg cellulitis: Assist with antibiotic  CHIEF COMPLAINT   Left leg redness with open wound    HISTORY OF PRESENT ILLNESS       This is a very pleasant 46 y.o. male who was admitted to the hospital with a chief complaints of worsening left lower leg redness with open wound. He is diabetic had previous history of wound on his left leg and had history of nonhealing wound on his right leg that eventually required above-knee amputation. He was admitted started on IV antibiotics. I was asked to assist with antibiotic treatment. He is currently on Zyvox he has history of bipolar disorder diabetes with neuropathy and previous history of diabetic ulcer he denies any fever or chills he did report of swelling and pain on the left leg. PAST MEDICAL  HISTORY       Past Medical History:   Diagnosis Date    Bipolar 2 disorder [facility])     previously followed with Dr. Josefina Bledsoe and Jaya Calderon in [facility] BPH (benign prostatic hyperplasia)     Diabetes mellitus type 2, uncontrolled ([facility]     HgbA1c on 2019 was 9.1.     Diabetic peripheral neuropathy (HCC)     Diabetic polyneuropathy ([address]     Diabetic ulcer of right foot associated with type 2 diabetes mellitus ([address] 12/10/2015    Essential hypertension     \"never been on b/p medication that I know of\"    GERD (gastroesophageal reflux disease)     Hammer toe of left foot     Heart murmur     denies any chest pain or palpitations    History of tobacco abuse     Hx of AKA (above knee amputation), right ([facility] 2019    Dr. Mattie Trammell    Macular edema, diabetic, bilateral ([facility] 2018     Chinaannmariejazzmine referred to retina specialist for 2nd opinion    Marijuana abuse in remission     Melena     MRSA (methicillin resistant staph aureus) culture positive     Onychomycosis     Other disorders of kidney and ureter in diseases classified elsewhere     Sleep apnea     no cpap    Thyroid disease     WD-Skin ulcer of fourth toe of right foot with necrosis of bone ([address] 6/29/2016       PAST SURGICAL HISTORY     Past Surgical History:   Procedure Laterality Date    ABSCESS DRAINAGE Right     foot    AMPUTATION ABOVE KNEE Right 4/18/2019    RIGHT ABOVE KNEE AMPUTATION performed by Andres Olvera MD at [address] [facility], LAPAROSCOPIC N/A 4/1/2020    ROBOTIC CHOLECYSTECTOMY performed by Parry Gottron, MD at [address] [facility] N/A 7/20/2020    CYSTOSCOPY performed by Odis Nissen., MD at [address] [facility] 8/21/2020    CYSTOSCOPY, UROLIFT performed by Odis Nissen., MD at [address] [facility], COLON, DIAGNOSTIC     [address] Right 07/01/2016    I & D    GASTROCNEMIUS RECESSION Left 11/12/2021    LEFT LEG GASTROCS RECESSION performed by Willow Walker MD at [address] [facility] Right 2/18/2019    I&D RIGHT STUMP performed by Andres Olvera MD at [address] [facility] Right 07/20/2016    LEG AMPUTATION BELOW KNEE Right 4/4/2019    I&D AND REVISION OF AMPUTATION RIGHT LEG performed by Andres Olvera MD at [address] [facility] Right 1/14/15    sole of foot I&D    AK DRAIN INFECT SHOULDER BURSA Left 8/18/2017    LEFT SHOULDER INCISION AND DRAINAGE performed by Andres Olvera MD at [address] [facility] OFFICE/OUTPT [address] Right 9/20/2018    EXCISIONAL DEBRIDEMENT RIGHT BKA STUMP performed by Juan Martinez MD at [facility] Right 1/16/15    2nd toe with wound vac applied    UPPER GASTROINTESTINAL ENDOSCOPY N/A 3/3/2020    EGD DILATION SAVORY performed by Abbey Wright MD at [facility]    WISDOM TOOTH EXTRACTION  ? when         MEDICATIONS:       Scheduled Meds:   insulin lispro  0-12 Units SubCUTAneous TID WC    insulin lispro  0-6 Units SubCUTAneous Nightly    cefTRIAXone (ROCEPHIN) IV  1,000 mg IntraVENous Q24H    ARIPiprazole  5 mg Oral Daily    atorvastatin  40 mg Oral Nightly    fenofibrate  160 mg Oral Daily    gabapentin  300 mg Oral TID    insulin glargine  75 Units SubCUTAneous BID    levothyroxine  50 mcg Oral Daily    lisinopril  5 mg Oral Daily    metoprolol tartrate  50 mg Oral BID    [Held by provider] sertraline  50 mg Oral Daily    sodium chloride flush  5-40 mL IntraVENous 2 times per day    enoxaparin  40 mg SubCUTAneous Daily    linezolid  600 mg IntraVENous Q12H     Continuous Infusions:   dextrose      sodium chloride       PRN Meds:glucose, dextrose, glucagon (rDNA), dextrose, nitroGLYCERIN, oxyCODONE-acetaminophen, sodium chloride flush, sodium chloride, ondansetron **OR** ondansetron, polyethylene glycol, acetaminophen **OR** acetaminophen, potassium chloride **OR** potassium alternative oral replacement **OR** potassium chloride, magnesium sulfate  Allergies:   ALLERGIES:    Pcn [penicillins], Actos [pioglitazone], Clindamycin/lincomycin, Vancomycin, and Metformin and related        SOCIAL HISTORY:     TOBACCO:   reports that he has been smoking cigars. He started smoking about 36 years ago. He has been smoking about 0.00 packs per day for the past 10.00 years. He has never used smokeless tobacco.     ETOH:   reports previous alcohol use.   Patient currently lives in a group home like facility    FAMILY HISTORY:         Problem Relation Age of Onset    Diabetes Mother     Other Mother         pneumonia, H1N1    Depression Mother     Early Death Mother     High Blood Pressure Mother     High Cholesterol Mother     Vision Loss Maternal Grandmother     Arthritis Maternal Grandfather     Heart Disease Maternal Grandfather        REVIEW OF SYSTEMS:     Constitutional: no fever, no night sweats, no fatigue, no weight loss. Head: no head ache , no head injury, no migranes. Eye: no eye discharge, blurring of vision, no double vision,no eye pain. Ears: no hearing difficulty, no tinnitus  Mouth/throat: no ulceration, dental caries , dysphagia, no hoarseness and voice change  Respiratory: no cough no chest pain,no shortness of breath,no wheezing  CVS: no palpitation, no chest pain,   GI: no abdominal pain, no nausea , no vomiting, no constipation,no diarrhea. ISMAEL: no dysuria, frequency and urgency, no hematuria, no kidney stones  Musculoskeletal: no joint pain, swelling , stiffness,  Endocrine: no polyuria, polydipsia, no cold or heat intolerance  Hematology: no anemia, no easy brusing or bleeding, no hx of clotting disorder  Dermatology: no skin rash, no skin lesions, no pruritis,  Neurological:no headaches,no dizziness, no seizure, no numbness. Psychiatry: History of bipolar disorder  PHYSICAL EXAM:     BP (!) 112/57   Pulse 73   Temp 98 °F (36.7 °C) (Oral)   Resp 18   Wt 223 lb (101.2 kg)   SpO2 95%   BMI 35.99 kg/m²   General apperance:  Awake, alert, not in distress. HEENT: pink conjunctiva, unicteric sclera, moist oral mucosa. Chest: Bilateral air entry  Cardiovascular:  RRR ,S1S2, no murmur or gallop. Abdomen:  Soft, non tender to palpation. Extremities: Right above-knee amputation, left leg is swollen there is redness on the lateral aspect of the leg with open superficial wound is warm to touch. Skin:  Warm and dry. CNS: Oriented to person place and time  .           LABS:     CBC:   Recent Labs     12/29/21 1928 12/30/21  0505   WBC 11.9* 11.2*   HGB 15.5 13.1*    208     BMP:    Recent Labs     12/29/21 1928 12/30/21  0505   * 135   K 4.4 4.1   CL 97* 99   CO2 23 22*   BUN 16 16   CREATININE 0.8 0.9   GLUCOSE 282* 320*     Calcium:  Recent Labs     12/30/21  0505   CALCIUM 9.0     Ionized Calcium:No results for input(s): IONCA in the last 72 hours. Magnesium:No results for input(s): MG in the last 72 hours. Phosphorus:No results for input(s): PHOS in the last 72 hours. BNP:No results for input(s): BNP in the last 72 hours. Glucose:  Recent Labs     12/30/21  0805 12/30/21  1153 12/30/21  1510   POCGLU 256* 266* 299*     HgbA1C: No results for input(s): LABA1C in the last 72 hours. INR: No results for input(s): INR in the last 72 hours. Hepatic: No results for input(s): ALKPHOS, ALT, AST, PROT, BILITOT, BILIDIR, LABALBU in the last 72 hours. Amylase and Lipase:  Recent Labs     12/29/21 1928   LACTA 2.2*     Lactic Acid:   Recent Labs     12/29/21 1928   LACTA 2.2*     Troponin: No results for input(s): CKTOTAL, CKMB, TROPONINI in the last 72 hours. BNP: No results for input(s): BNP in the last 72 hours. Lipids: No results for input(s): CHOL, TRIG, HDL, LDL, LDLCALC in the last 72 hours. ABGs: No results found for: PHART, PO2ART, QQC9QBL, LUM1IPF, BEART    Cultures:      CXR:       UA:   Recent Labs     12/30/21  0125   PHUR 5.0   COLORU YELLOW   PROTEINU TRACE*   BLOODU NEGATIVE   RBCUA 0-2   WBCUA 0-2   BACTERIA NONE SEEN   NITRU NEGATIVE   GLUCOSEU >= 1000*   BILIRUBINUR NEGATIVE   UROBILINOGEN 0.2   KETUA NEGATIVE         IMAGING:    Micro:   Lab Results   Component Value Date    BC No growth-preliminary  12/29/2021       Problem list of patient      Patient Active Problem List   Diagnosis Code    Status post below knee amputation of right lower extremity ([facility] Z89.511    Gait disturbance R26.9    Sebaceous cyst L72.3    Uncontrolled type 2 diabetes mellitus with hyperglycemia, with long-term current use of insulin ([facility]) E11.65, Z79.4    Severe bipolar II disorder, recent episode major depressive, remission ([facility]) F31.81    Bipolar 1 disorder ([facility]) F31.9    Leukocytosis D72.829    Lactic acidosis E87.2    Hyponatremia E87.1    Normocytic anemia D64.9    Obesity (BMI 30-39. 9) E66.9    History of noncompliance with medical treatment Z91.19    Essential hypertension I10    Tobacco dependence F17.200    Gastroesophageal reflux disease without esophagitis K21.9    History of marijuana use Z87.898    Physical debility R53.81    Anemia D64.9    Electrolyte imbalance E87.8    Type 2 diabetes mellitus with hyperglycemia, with long-term current use of insulin ([facility]) E11.65, Z79.4    Weakness R53.1    Infection of above knee amputation stump of right leg ([facility]) T87.43    Above knee amputation of right lower extremity ([facility]) R69.560Q    Sepsis ([facility]) A41.9    Vitamin D deficiency E55.9    COPD exacerbation ([facility]) J44.1    Influenza B J10.1    Depression, recurrent ([facility]) F33.9    Major depressive disorder, recurrent ([facility]) F33.9    GI bleed K92.2    Elevated lipase R74.8    Other psychoactive substance abuse, uncomplicated ([facility]) H80.53    Calculus of gallbladder without cholecystitis without obstruction K80.20    Right upper quadrant abdominal pain R10.11    Pedal edema R60.0    MURALI (obstructive sleep apnea) G47.33    Shortness of breath R06.02    Hypothyroid E03.9    Anxiety and depression F41.9, F32. A    Dyspnea R06.00    Sinus tachycardia X55.0    Metabolic acidosis W69.0    Edema of left lower extremity R60.0    SOB (shortness of breath) on exertion R06.02    Intermittent palpitations R00.2    History of chest pain Z87.898    Dizziness, nonspecific R42    Hyperlipidemia LDL goal <70 E78.5    Neuropathy G62.9    Diabetic ulcer of left lower leg associated with type 2 diabetes mellitus ([facility]) E11.622, L97.929           Impression and Recommendation:   Left lower leg cellulitis: Continue IV Zyvox, Rocephin was added for gram-negative coverage  We will apply moist gauze dressing and Kerlix with Ace wrap  Diabetes continue current treatment  Thank you Jennifer Posada PA-C for allowing me to participate in this patient's care.     Sixto Bonilla MD, MD,FACP 12/30/2021 4:30 PM

## 2021-12-30 NOTE — CARE COORDINATION
12/30/21, 12:46 PM EST  DISCHARGE PLANNING EVALUATION:    Constantino Wlal       Admitted: 12/29/2021/ [facility] day: 1   Location: [address] [place] [address] Reason for admit: Wound infection [T14. 8XXA, L08.9]  Cellulitis of left lower extremity [L03.116]  Diabetic ulcer of both feet ([address] [J66.374, L97.519, L97.529]  Diabetic ulcer of left foot due to type 2 diabetes mellitus ([address] [D65.886, L97.529]  Diabetic ulcer of left shin ([address] [T61.327, L97.829]   PMH:  has a past medical history of Bipolar 2 disorder ([address] BPH (benign prostatic hyperplasia), Diabetes mellitus type 2, uncontrolled ([address] Diabetic peripheral neuropathy ([address] Diabetic polyneuropathy ([address] Diabetic ulcer of right foot associated with type 2 diabetes mellitus ([address] Essential hypertension, GERD (gastroesophageal reflux disease), Hammer toe of left foot, Heart murmur, History of tobacco abuse, Hx of AKA (above knee amputation), right ([address] Macular edema, diabetic, bilateral ([address] Marijuana abuse in remission, Melena, MRSA (methicillin resistant staph aureus) culture positive, Onychomycosis, Other disorders of kidney and ureter in diseases classified elsewhere, Sleep apnea, Thyroid disease, and 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 Procedure: N/A  Barriers to Discharge:  Patient presents due to left leg wound with pain and drainage. Consults to General Surgery and ID, Rocephin x 1 dose, IV Zyvox, Lovenox, DM management, prn medications, Magnesium and Potassium replacement protocol, card choice diet, up with assistance, consult to wound/ostomy RN. PCP: Tonny Curry MD  Readmission Risk Score: 14.5 ( )%    Patient Goals/Plan/Treatment Preferences: Met with Eduardo Triplett. He resides at home with friends. Eduardo Triplett verifies his insurance and PCP. He is able to afford his medications and has all the DME needed. [facility]ardo [employer] states he will have transportation to home at discharge. [place] [employer] is in need of wound care at discharge.  He agrees to HH at discharge. Aubree Farias states he has used [facility] in the past. He would like to use their services at discharge. Transportation/Food Security/Housekeeping Addressed:  No issues identified.

## 2021-12-30 NOTE — CONSULTS
Gilda Steward MD  General Surgery consult   Pt Name: Karen Farrell  MRN: [id number]  YOB: 1970  Date of evaluation: 12/30/2021  Primary Care Physician: Prieto Rebollar MD  Consulting Physician Dr. Jodi Zendejas  Reason for evaluation: left leg wound       Chief complaint:    Feeling good    SUBJECTIVE:     HPI:    Jasmina Hooks is a 46 y.o.male who was admitted for a left lower extremity wound. Patient is a history of type 2 diabetes that is poorly controlled. Patient states he thinks his blood sugar is usually well controlled. Patient has had diabetic foot wound on the opposite leg ultimately requiring above-the-knee amputation. Patient states he noted he had small ulcers on his leg for a little while not sure how many days or weeks. And then suddenly it became larger. It is minimally tender he says he has not had any fevers or chills. He does have drainage from the wound. No alleviating or aggravating factors. Review of Systems:  Review of Systems   Constitutional: Negative for activity change, appetite change, chills and fatigue. HENT: Negative. Eyes: Negative. Respiratory: Negative. Cardiovascular: Negative. Gastrointestinal: Negative. Genitourinary: Negative. Musculoskeletal: Negative for myalgias and neck pain. Skin: Positive for wound. Neurological: Negative. Hematological: Negative. Psychiatric/Behavioral: Negative. Past Medical History  Past Medical History:   Diagnosis Date    Bipolar 2 disorder [facility])     previously followed with Dr. Savage De Souza and Myrtha Paget in [facility] BPH (benign prostatic hyperplasia)     Diabetes mellitus type 2, uncontrolled ([address]     HgbA1c on 4/2/2019 was 9.1.     Diabetic peripheral neuropathy ([address]     Diabetic polyneuropathy ([address]     Diabetic ulcer of right foot associated with type 2 diabetes mellitus ([address] 12/10/2015    Essential hypertension     \"never been on b/p medication that I know of\"    GERD (gastroesophageal reflux disease)     Hammer toe of left foot     Heart murmur     denies any chest pain or palpitations    History of tobacco abuse     Hx of AKA (above knee amputation), right ([facility] 04/18/2019    Dr. Garrett Petdustin edema, diabetic, bilateral ([facility] 05/04/2018    Dr. Isadora Jenkins referred to retina specialist for 2nd opinion    Marijuana abuse in remission     Melena     MRSA (methicillin resistant staph aureus) culture positive     Onychomycosis     Other disorders of kidney and ureter in diseases classified elsewhere     Sleep apnea     no cpap    Thyroid disease     WD-Skin ulcer of fourth toe of right foot with necrosis of bone ([facility] 6/29/2016       Past Surgical History  Past Surgical History:   Procedure Laterality Date    ABSCESS DRAINAGE Right     foot    AMPUTATION ABOVE KNEE Right 4/18/2019    RIGHT ABOVE KNEE AMPUTATION performed by Cristian Alejo MD at [address] [facility], LAPAROSCOPIC N/A 4/1/2020    ROBOTIC CHOLECYSTECTOMY performed by Malissa Hernandez MD at [address] [facility] 7/20/2020    CYSTOSCOPY performed by Kane Rousseau MD at [address] [facility] Prachi Sepulveda 8/21/2020    CYSTOSCOPY, UROLIFT performed by Kane Rousseau MD at [address] [facility], COLON, DIAGNOSTIC     [facility] Right 07/01/2016    I & D    GASTROCNEMIUS RECESSION Left 11/12/2021    LEFT LEG GASTROCS RECESSION performed by Natalie Carrillo MD at [address] [facility] Right 2/18/2019    I&D RIGHT STUMP performed by Cristian Alejo MD at [place] [address] Right 07/20/2016    LEG AMPUTATION BELOW KNEE Right 4/4/2019    I&D AND REVISION OF AMPUTATION RIGHT LEG performed by Cristian Alejo MD at [address] [facility] Right 1/14/15    sole of foot I&D    MN DRAIN INFECT SHOULDER BURSA Left 8/18/2017    LEFT SHOULDER INCISION AND DRAINAGE performed by Cristian Alejo MD at [address] [facility] OFFICE/OUTPT by mouth daily 3/17/21  Yes MARILYN Ragland CNP   ARIPiprazole (ABILIFY) 5 MG tablet Take 5 mg by mouth daily  12/16/20  Yes Historical Provider, MD   Insulin Disposable Pump (OMNIPOD DASH 5 PACK PODS) MISC CHANGE PODS EVERY 48 HOURS AS DIRECTED  Patient not taking: Reported on 12/21/2021 11/29/21   Itzel Ignacio MD   Insulin Pen Needle (PEN NEEDLES) 31G X 8 MM MISC For use with insulin injections five timed per day dx:E11.9 11/9/21   Phuc Ortega MD   nitroGLYCERIN (NITROSTAT) 0.3 MG SL tablet Place 1 tablet under the tongue every 5 minutes as needed for Chest pain up to max of 3 total doses.  If no relief after 1 dose, call 911. 8/16/21   Itzel Ignacio MD   Continuous Blood Gluc Transmit (DEXCOM G6 TRANSMITTER) MISC 1 Device by Does not apply route every 3 months 3/22/21   MARILYN Ragland CNP   Continuous Blood Gluc Sensor (DEXCOM G6 SENSOR) MISC 1 Device by Does not apply route every 14 days 3/22/21   MARILYN Ragland CNP   Continuous Blood Gluc  (DEXCOM G6 ) LIANNE 1 Device by Does not apply route 4 times daily (before meals and nightly) 3/22/21   MARILYN Ragland CNP    Scheduled Meds:  Veronika Draft insulin lispro  0-12 Units SubCUTAneous TID WC    insulin lispro  0-6 Units SubCUTAneous Nightly    ARIPiprazole  5 mg Oral Daily    atorvastatin  40 mg Oral Nightly    fenofibrate  160 mg Oral Daily    gabapentin  300 mg Oral TID    insulin glargine  75 Units SubCUTAneous BID    levothyroxine  50 mcg Oral Daily    lisinopril  5 mg Oral Daily    metoprolol tartrate  50 mg Oral BID    [Held by provider] sertraline  50 mg Oral Daily    sodium chloride flush  5-40 mL IntraVENous 2 times per day    enoxaparin  40 mg SubCUTAneous Daily    linezolid  600 mg IntraVENous Q12H     Continuous Infusions:   dextrose      sodium chloride       PRN Meds:.glucose, dextrose, glucagon (rDNA), dextrose, nitroGLYCERIN, oxyCODONE-acetaminophen, sodium chloride flush, sodium chloride, ondansetron **OR** ondansetron, polyethylene glycol, acetaminophen **OR** acetaminophen, potassium chloride **OR** potassium alternative oral replacement **OR** potassium chloride, magnesium sulfate    Allergies  Allergies   Allergen Reactions    Pcn [Penicillins] Shortness Of Breath, Nausea And Vomiting and Other (See Comments)     Does not remember reactions. Has TOLERATED amoxicillin and several different cephalosporins.  Actos [Pioglitazone] Swelling    Clindamycin/Lincomycin Itching    Vancomycin Hives      Rash, with erythematous plaques to abdomen, shoulders, and proximal upper extremities with pruritis, persisted with 2nd dose administered slower.       Metformin And Related Swelling        Family History  Family History   Problem Relation Age of Onset   [facility] Diabetes Mother     Other Mother         pneumonia, H1N1    Depression Mother     Early Death Mother     High Blood Pressure Mother     High Cholesterol Mother     Vision Loss Maternal Grandmother     Arthritis Maternal Grandfather     Heart Disease Maternal Grandfather        Social History  Social History     Socioeconomic History    Marital status:      Spouse name: None    Number of children: 2    Years of education: 15    Highest education level: None   Occupational History    None   Tobacco Use    Smoking status: Current Some Day Smoker     Packs/day: 0.00     Years: 10.00     Pack years: 0.00     Types: Cigars     Start date: 1985     Last attempt to quit: 2000     Years since quittin.3    Smokeless tobacco: Never Used   Vaping Use    Vaping Use: Former    Substances: Nicotine, Flavoring   Substance and Sexual Activity    Alcohol use: Not Currently     Alcohol/week: 0.0 standard drinks    Drug use: No     Comment: 30 YEARS AGO    Sexual activity: Yes     Partners: Female   Other Topics Concern    None   Social History Narrative    None     Social Determinants of Health     Financial Resource Strain: Low Risk     Difficulty of Paying Living Expenses: Not very hard   Food Insecurity: No Food Insecurity    Worried About Running Out of Food in the Last Year: Never true    Gwen of Food in the Last Year: Never true   Transportation Needs: No Transportation Needs    Lack of Transportation (Medical): No    Lack of Transportation (Non-Medical): No   Physical Activity:     Days of Exercise per Week: Not on file    Minutes of Exercise per Session: Not on file   Stress:     Feeling of Stress : Not on file   Social Connections:     Frequency of Communication with Friends and Family: Not on file    Frequency of Social Gatherings with Friends and Family: Not on file    Attends Anabaptist Services: Not on file    Active Member of [address] [place] [address] [employer] [employer] or Organizations: Not on file    Attends Club or Organization Meetings: Not on file    Marital Status: Not on file   Intimate Partner Violence:     Fear of Current or Ex-Partner: Not on file    Emotionally Abused: Not on file    Physically Abused: Not on file    Sexually Abused: Not on file   Housing Stability:     Unable to Pay for Housing in the Last Year: Not on file    Number of [place] in the Last Year: Not on file    Unstable Housing in the Last Year: Not on file         OBJECTIVE:   CURRENT VITALS:  weight is 223 lb (101.2 kg). His oral temperature is 97.5 °F (36.4 °C). His blood pressure is 107/53 (abnormal) and his pulse is 74. His respiration is 18 and oxygen saturation is 100%. Body mass index is 35.99 kg/m². Physical Exam  Constitutional:       Appearance: He is obese. He is not ill-appearing. Cardiovascular:      Rate and Rhythm: Normal rate. Pulses: Normal pulses. Pulmonary:      Effort: Pulmonary effort is normal.   Abdominal:      Palpations: Abdomen is soft. Skin:     Comments: Left lower extremity on the pretibial area there is findings consistent with chronic venous stasis disease. He has multiple shallow ulcers.   There is redness around this area however it is not warm is not tender to palpation and has no associated fluctuance. There is fibrinous exudate on the top of the wound. Neurological:      General: No focal deficit present. Mental Status: He is alert and oriented to person, place, and time. Psychiatric:         Mood and Affect: Mood normal.         Behavior: Behavior normal.          LABS:     Recent Labs     12/29/21  1928 12/30/21  0125 12/30/21  0505   WBC 11.9*  --  11.2*   HGB 15.5  --  13.1*   HCT 45.0  --  38.0*     --  208   *  --  135   K 4.4  --  4.1   CL 97*  --  99   CO2 23  --  22*   BUN 16  --  16   CREATININE 0.8  --  0.9   CALCIUM 9.9  --  9.0   LIPASE 232.7*  --   --    LACTA 2.2*  --   --    NITRU  --  NEGATIVE  --    COLORU  --  YELLOW  --    BACTERIA  --  NONE SEEN  --        RADIOLOGY:   I have personally reviewed the following films:  VL DUP LOWER EXTREMITY VENOUS LEFT   Final Result   Normal venous ultrasound. No evidence for acute deep venous thrombosis. **This report has been created using voice recognition software. It may contain minor errors which are inherent in voice recognition technology. **      Final report electronically signed by Dr. Compa Cadena on 12/29/2021 8:38 PM      XR TIBIA FIBULA LEFT (2 VIEWS)   Final Result   Focal soft tissue swelling overlies the distal tibial shaft anteriorly. No fracture. No bone destruction. **This report has been created using voice recognition software. It may contain minor errors which are inherent in voice recognition technology. **      Final report electronically signed by Dr. Compa Cadena on 12/29/2021 7:36 PM          IMPRESSIONS:   Patient with superficial wound to his left lower extremity likely secondary to chronic venous insufficiency. PLANS:   No surgical intervention at this time there is no areas of necrosis no abscess to drain.   This could however progress over the course of his stay and if that would happen may need to reevaluate for possible surgical intervention. At this time keep covered with dry gauze. Recommend wound team consult.     Electronically signed by Ambreen Martin MD on 12/30/2021 at 2:53 PM                  Electronically signed by Ambreen Martin MD  on 12/30/2021 at 2:53 PM

## 2021-12-30 NOTE — ED PROVIDER NOTES
[facility] Emergency Department    CHIEF COMPLAINT     No chief complaint on file. Nurses Notes reviewed and I agree except as noted in the HPI. HISTORY OF PRESENT ILLNESS    Graciela Dougherty derrick 46 y.o. male who presents to the ED for evaluation of a left leg wound. The patient doesn't know when it started or what happened. Today he noted it to be draining a lot of purulent drainage. He is diabetic and already has had a right AKA. HPI was provided by the patient    REVIEW OF SYSTEMS     Review of Systems   Constitutional: Negative for chills, fatigue and fever. HENT: Negative for congestion, ear discharge, ear pain, postnasal drip and rhinorrhea. Eyes: Negative for redness. Respiratory: Negative for cough and chest tightness. Cardiovascular: Negative for chest pain and leg swelling. Gastrointestinal: Negative for abdominal pain, nausea and vomiting. Genitourinary: Negative for difficulty urinating, dysuria, enuresis, flank pain and hematuria. Musculoskeletal: Positive for arthralgias and myalgias. Negative for back pain and joint swelling. Skin: Positive for wound. Negative for rash. Neurological: Negative for dizziness, light-headedness, numbness and headaches. Psychiatric/Behavioral: Negative for agitation, behavioral problems and confusion. All other systems negative except as noted. PAST MEDICAL HISTORY     Past Medical History:   Diagnosis Date    Bipolar 2 disorder [facility])     previously followed with Dr. Del Johnson and Kwame Sol in [facility] BPH (benign prostatic hyperplasia)     Diabetes mellitus type 2, uncontrolled ([address]     HgbA1c on 4/2/2019 was 9.1.     Diabetic peripheral neuropathy ([address]     Diabetic polyneuropathy ([address]     Diabetic ulcer of right foot associated with type 2 diabetes mellitus ([address] 12/10/2015    Essential hypertension     \"never been on b/p medication that I know of\"    GERD (gastroesophageal reflux disease)  Hammer toe of left foot     Heart murmur     denies any chest pain or palpitations    History of tobacco abuse     Hx of AKA (above knee amputation), right ([address] 04/18/2019    Dr. Agarwal Settle Macular edema, diabetic, bilateral ([address] 05/04/2018    Dr. Benigno Wilson referred to retina specialist for 2nd opinion    Marijuana abuse in remission     Melena     MRSA (methicillin resistant staph aureus) culture positive     Onychomycosis     Other disorders of kidney and ureter in diseases classified elsewhere     Sleep apnea     no cpap    Thyroid disease     WD-Skin ulcer of fourth toe of right foot with necrosis of bone ([address] 6/29/2016       SURGICALHISTORY      has a past surgical history that includes other surgical history (Right, 1/14/15); Abscess Drainage (Right); Toe amputation (Right, 1/16/15); Foot Debridement (Right, 07/01/2016); Leg amputation below knee (Right, 07/20/2016); [place] tooth extraction (?when); pr drain infect shoulder bursa (Left, 8/18/2017); pr office/outpt visit,procedure only (Right, 9/20/2018); incision and drainage (Right, 2/18/2019); Leg amputation below knee (Right, 4/4/2019); AMPUTATION ABOVE KNEE (Right, 4/18/2019); Upper gastrointestinal endoscopy (N/A, 3/3/2020); Cholecystectomy, laparoscopic (N/A, 4/1/2020); Endoscopy, colon, diagnostic; Cystoscopy (N/A, 7/20/2020); Cystoscopy (N/A, 8/21/2020); and Gastrocnemius Recession (Left, 11/12/2021).     CURRENT MEDICATIONS       Previous Medications    ARIPIPRAZOLE (ABILIFY) 5 MG TABLET    Take 5 mg by mouth daily     ATORVASTATIN (LIPITOR) 40 MG TABLET    Take 1 tablet by mouth nightly    CALCIUM-PHOSPHORUS-VITAMIN D (CALCIUM/D3 ADULT GUMMIES PO)    Take 2 tablets by mouth daily    CONTINUOUS BLOOD GLUC  (DEXCOM G6 ) LIANNE    1 Device by Does not apply route 4 times daily (before meals and nightly)    CONTINUOUS BLOOD GLUC SENSOR (DEXCOM G6 SENSOR) MISC    1 Device by Does not apply route every 14 days    CONTINUOUS BLOOD GLUC TRANSMIT (DEXCOM G6 TRANSMITTER) MISC    1 Device by Does not apply route every 3 months    EMPAGLIFLOZIN (JARDIANCE) 25 MG TABLET    Take 1 tablet by mouth daily New dose    FENOFIBRATE (TRICOR) 145 MG TABLET    Take 1 tablet by mouth daily    GABAPENTIN (NEURONTIN) 300 MG CAPSULE    TAKE 1 CAPSULE BY MOUTH THREE TIMES DAILY. INSULIN DISPOSABLE PUMP (OMNIPOD DASH 5 PACK PODS) MISC    CHANGE PODS EVERY 48 HOURS AS DIRECTED    INSULIN GLARGINE, 2 UNIT DIAL, 300 UNIT/ML SOPN    Change to 75 units BID and every 10 days increase by 5 units both times, to get am sugars 150-200    INSULIN LISPRO (HUMALOG KWIKPEN U-200) 200 UNIT/ML SOPN PEN    40 units Plus scale - Max dose 160 units per day    INSULIN PEN NEEDLE (PEN NEEDLES) 31G X 8 MM MISC    For use with insulin injections five timed per day dx:E11.9    LEVOTHYROXINE (SYNTHROID) 50 MCG TABLET    Take 1 tablet by mouth daily    LISINOPRIL (PRINIVIL;ZESTRIL) 5 MG TABLET    Take 1 tablet by mouth daily    METOPROLOL TARTRATE (LOPRESSOR) 50 MG TABLET    Take 1 tablet by mouth 2 times daily    MULTIPLE VITAMINS-MINERALS (THERAPEUTIC MULTIVITAMIN-MINERALS) TABLET    Take 1 tablet by mouth daily    NITROGLYCERIN (NITROSTAT) 0.3 MG SL TABLET    Place 1 tablet under the tongue every 5 minutes as needed for Chest pain up to max of 3 total doses. If no relief after 1 dose, call 911. OXYCODONE-ACETAMINOPHEN (PERCOCET) 5-325 MG PER TABLET    Take 1 tablet by mouth every 6 hours as needed. SERTRALINE (ZOLOFT) 50 MG TABLET    Take 50 mg by mouth daily       ALLERGIES     is allergic to pcn [penicillins], actos [pioglitazone], clindamycin/lincomycin, vancomycin, and metformin and related. FAMILY HISTORY     He indicated that his mother is . He indicated that the status of his father is unknown and reported the following: unknown. He indicated that the status of his maternal grandmother is unknown.  He indicated that the status of his maternal grandfather is unknown.   family history includes Arthritis in his maternal grandfather; Depression in his mother; Diabetes in his mother; Early Death in his mother; Heart Disease in his maternal grandfather; High Blood Pressure in his mother; High Cholesterol in his mother; Other in his mother; Vision Loss in his maternal grandmother. SOCIAL HISTORY       Social History     Socioeconomic History    Marital status:      Spouse name: Not on file    Number of children: 2    Years of education: 15    Highest education level: Not on file   Occupational History    Not on file   Tobacco Use    Smoking status: Current Some Day Smoker     Packs/day: 0.00     Years: 10.00     Pack years: 0.00     Types: Cigars     Start date: 1985     Last attempt to quit: 2000     Years since quittin.3    Smokeless tobacco: Never Used   Vaping Use    Vaping Use: Former    Substances: Nicotine, Flavoring   Substance and Sexual Activity    Alcohol use: Not Currently     Alcohol/week: 0.0 standard drinks    Drug use: No     Comment: 30 YEARS AGO    Sexual activity: Yes     Partners: Female   Other Topics Concern    Not on file   Social History Narrative    Not on file     Social Determinants of Health     Financial Resource Strain: Low Risk     Difficulty of Paying Living Expenses: Not very hard   Food Insecurity: No Food Insecurity    Worried About Running Out of Food in the Last Year: Never true    Gwen of Food in the Last Year: Never true   Transportation Needs: No Transportation Needs    Lack of Transportation (Medical): No    Lack of Transportation (Non-Medical):  No   Physical Activity:     Days of Exercise per Week: Not on file    Minutes of Exercise per Session: Not on file   Stress:     Feeling of Stress : Not on file   Social Connections:     Frequency of Communication with Friends and Family: Not on file    Frequency of Social Gatherings with Friends and Family: Not on file    Attends Zoroastrianism Services: Not on file    Active Member of Clubs or Organizations: Not on file    Attends Club or Organization Meetings: Not on file    Marital Status: Not on file   Intimate Partner Violence:     Fear of Current or Ex-Partner: Not on file    Emotionally Abused: Not on file    Physically Abused: Not on file    Sexually Abused: Not on file   Housing Stability:     Unable to Pay for Housing in the Last Year: Not on file    Number of [place] in the Last Year: Not on file    Unstable Housing in the Last Year: Not on file       PHYSICAL EXAM     INITIAL VITALS:  weight is 223 lb (101.2 kg). His temperature is 97.8 °F (36.6 °C). His blood pressure is 154/87 (abnormal) and his pulse is 93. His respiration is 18 and oxygen saturation is 99%. Physical Exam  Vitals and nursing note reviewed. Constitutional:       General: He is not in acute distress. Appearance: Normal appearance. He is well-developed. He is not ill-appearing or diaphoretic. HENT:      Head: Normocephalic and atraumatic. Nose: Nose normal.      Mouth/Throat:      Mouth: Mucous membranes are moist.      Pharynx: Oropharynx is clear. Eyes:      General:         Right eye: No discharge. Left eye: No discharge. Conjunctiva/sclera: Conjunctivae normal.   Neck:      Trachea: No tracheal deviation. Cardiovascular:      Rate and Rhythm: Normal rate and regular rhythm. Pulses:           Dorsalis pedis pulses are 1+ on the left side. Heart sounds: Murmur heard. Decrescendo systolic murmur is present. No gallop. Comments: Normal capillary refill  Pulmonary:      Effort: Pulmonary effort is normal. No respiratory distress. Breath sounds: Normal breath sounds. No stridor. Abdominal:      General: Bowel sounds are normal.      Palpations: Abdomen is soft. Musculoskeletal:         General: No tenderness or deformity. Normal range of motion. Cervical back: Normal range of motion.       Left lower le+ Edema present. Right Lower Extremity: Right leg is amputated above knee. Skin:     General: Skin is warm and dry. Capillary Refill: Capillary refill takes less than 2 seconds. Coloration: Skin is not pale. Findings: No erythema or rash. Neurological:      General: No focal deficit present. Mental Status: He is alert and oriented to person, place, and time. Cranial Nerves: No cranial nerve deficit. Psychiatric:         Behavior: Behavior normal.             DIFFERENTIAL DIAGNOSIS:   Cellulitis, DVT, wound infection, diabetic ulcer    DIAGNOSTIC RESULTS     EKG: All EKG's are interpreted by the Emergency Department Physician who eithersigns or Co-signs this chart in the absence of a cardiologist.        RADIOLOGY: non-plainfilm images(s) such as CT, Ultrasound and MRI are read by the radiologist.  Plain radiographic images are visualized and preliminarily interpreted by the emergency physician unless otherwise stated below. VL DUP LOWER EXTREMITY VENOUS LEFT   Final Result   Normal venous ultrasound. No evidence for acute deep venous thrombosis. **This report has been created using voice recognition software. It may contain minor errors which are inherent in voice recognition technology. **      Final report electronically signed by Dr. Gautam Done on 2021 8:38 PM      XR TIBIA FIBULA LEFT (2 VIEWS)   Final Result   Focal soft tissue swelling overlies the distal tibial shaft anteriorly. No fracture. No bone destruction. **This report has been created using voice recognition software. It may contain minor errors which are inherent in voice recognition technology. **      Final report electronically signed by Dr. Gautam Done on 2021 7:36 PM            LABS:   Labs Reviewed   BASIC METABOLIC PANEL - Abnormal; Notable for the following components:       Result Value    Sodium 132 (*)     Chloride 97 (*)     Glucose 282 (*)     All other components within normal limits   CBC WITH AUTO DIFFERENTIAL - Abnormal; Notable for the following components:    WBC 11.9 (*)     RDW-CV 14.7 (*)     RDW-SD 48.4 (*)     MPV 9.1 (*)     Eosinophils Absolute 0.6 (*)     Immature Grans (Abs) 0.09 (*)     All other components within normal limits   LACTIC ACID, PLASMA - Abnormal; Notable for the following components:    Lactic Acid 2.2 (*)     All other components within normal limits   LIPASE - Abnormal; Notable for the following components:    Lipase 232.7 (*)     All other components within normal limits   CULTURE, BLOOD 1   CULTURE, BLOOD 2   CULTURE, AEROBIC   PROCALCITONIN   ANION GAP   GLOMERULAR FILTRATION RATE, ESTIMATED   OSMOLALITY   URINE RT REFLEX TO CULTURE       EMERGENCY DEPARTMENT COURSE:   Vitals:    Vitals:    12/29/21 1711 12/29/21 1713   BP:  (!) 154/87   Pulse: 93    Resp:  18   Temp: 97.8 °F (36.6 °C)    SpO2: 99%    Weight: 223 lb (101.2 kg)                                MDM    Patient seen evaluate in the emergency room for a wound on his left lower leg. Appropriate labs and imaging are ordered and reviewed. Labs show mildly elevated white count and an elevated lactic acid. Patient is treated with Zyvox, Rocephin, IV fluids. Discussed admission versus discharge with the patient. Given the fact that he is already lost his right leg, he prefers to be more aggressive and is willing to stay. Discussed with the hospitalist and they agree to admit. Patient admitted in stable condition. Medications   linezolid (ZYVOX) IVPB 600 mg (has no administration in time range)   cefTRIAXone (ROCEPHIN) 1000 mg IVPB in 50 mL D5W minibag (has no administration in time range)   0.9 % sodium chloride bolus (1,000 mLs IntraVENous New Bag 12/29/21 2000)       Please note that the patient was evaluated during a pandemic. All efforts were made for HIPPA compliance as well as provision of appropriate care. Patient was seen independently by myself. The patient's final impression and disposition and plan was determined by myself. Strict return precautions and follow up instructions were discussed with the patient prior to discharge, with which the patient agrees. Physical assessment findings, diagnostic testing(s) if applicable, and vital signs reviewed with patient/patient representative. Questions answered. Medications asdirected, including OTC medications for supportive care. Education provided on medications. Differential diagnosis(s) discussed with patient/patient representative. Home care/self care instructions reviewed withpatient/patient representative. Patient is to follow-up with family care provider in 2-3 days if no improvement. Patient is to go to the emergency department if symptoms worsen. Patient/patient representative isaware of care plan, questions answered, verbalizes understanding and is in agreement. CRITICAL CARE:   None    CONSULTS:  Hospitalist    PROCEDURES:  None    FINAL IMPRESSION     1. Diabetic ulcer of left shin ([address]    2. Wound infection    3. Cellulitis of left lower extremity          DISPOSITION/PLAN   DISPOSITION Decision To Admit 12/29/2021 09:50:01 PM      PATIENT REFERREDTO:  No follow-up provider specified.     DISCHARGE MEDICATIONS:  New Prescriptions    No medications on file       (Please note that portions of this note were completed with a voice recognition program.  Efforts were made to edit the dictations but occasionally words are mis-transcribed.)         MARILYN Olivares CNP, APRN - CNP  12/29/21 9122

## 2021-12-30 NOTE — PROGRESS NOTES
Wound ostomy consulted for left leg wound. General surgery consulted for wound. Would recommend consult to podiatry for continuity of care. Will follow/await recommendations from general surgery.

## 2021-12-30 NOTE — ED NOTES
Pt up in bed with blankets over bottom half. Patient updated on plan of care at this time and expresses no concerns regarding treatment. Patient VSS. Respirations are regular and unlabored, patient is alert and oriented x4. Patient bed rails up x2 and call light within reach. Will continue to monitor.        Chasidy Londono RN  12/29/21 1601

## 2021-12-31 LAB
ANION GAP SERPL CALCULATED.3IONS-SCNC: 13 MEQ/L (ref 8–16)
BUN BLDV-MCNC: 20 MG/DL (ref 7–22)
CALCIUM SERPL-MCNC: 9.3 MG/DL (ref 8.5–10.5)
CHLORIDE BLD-SCNC: 102 MEQ/L (ref 98–111)
CO2: 21 MEQ/L (ref 23–33)
CREAT SERPL-MCNC: 0.9 MG/DL (ref 0.4–1.2)
ERYTHROCYTE [DISTWIDTH] IN BLOOD BY AUTOMATED COUNT: 14.7 % (ref 11.5–14.5)
ERYTHROCYTE [DISTWIDTH] IN BLOOD BY AUTOMATED COUNT: 49.2 FL (ref 35–45)
GFR SERPL CREATININE-BSD FRML MDRD: 88 ML/MIN/1.73M2
GLUCOSE BLD-MCNC: 154 MG/DL (ref 70–108)
GLUCOSE BLD-MCNC: 168 MG/DL (ref 70–108)
GLUCOSE BLD-MCNC: 233 MG/DL (ref 70–108)
GLUCOSE BLD-MCNC: 237 MG/DL (ref 70–108)
GLUCOSE BLD-MCNC: 263 MG/DL (ref 70–108)
GLUCOSE BLD-MCNC: 266 MG/DL (ref 70–108)
HCT VFR BLD CALC: 41.5 % (ref 42–52)
HEMOGLOBIN: 14.1 GM/DL (ref 14–18)
MCH RBC QN AUTO: 31.3 PG (ref 26–33)
MCHC RBC AUTO-ENTMCNC: 34 GM/DL (ref 32.2–35.5)
MCV RBC AUTO: 92.2 FL (ref 80–94)
PLATELET # BLD: 230 THOU/MM3 (ref 130–400)
PMV BLD AUTO: 9.7 FL (ref 9.4–12.4)
POTASSIUM SERPL-SCNC: 4.3 MEQ/L (ref 3.5–5.2)
RBC # BLD: 4.5 MILL/MM3 (ref 4.7–6.1)
SODIUM BLD-SCNC: 136 MEQ/L (ref 135–145)
WBC # BLD: 11.1 THOU/MM3 (ref 4.8–10.8)

## 2021-12-31 PROCEDURE — 6360000002 HC RX W HCPCS: Performed by: HOSPITALIST

## 2021-12-31 PROCEDURE — 6370000000 HC RX 637 (ALT 250 FOR IP): Performed by: HOSPITALIST

## 2021-12-31 PROCEDURE — 6370000000 HC RX 637 (ALT 250 FOR IP): Performed by: INTERNAL MEDICINE

## 2021-12-31 PROCEDURE — 80048 BASIC METABOLIC PNL TOTAL CA: CPT

## 2021-12-31 PROCEDURE — 6360000002 HC RX W HCPCS: Performed by: INTERNAL MEDICINE

## 2021-12-31 PROCEDURE — 85027 COMPLETE CBC AUTOMATED: CPT

## 2021-12-31 PROCEDURE — 1200000000 HC SEMI PRIVATE

## 2021-12-31 PROCEDURE — 2580000003 HC RX 258: Performed by: HOSPITALIST

## 2021-12-31 PROCEDURE — 2580000003 HC RX 258: Performed by: INTERNAL MEDICINE

## 2021-12-31 PROCEDURE — 99232 SBSQ HOSP IP/OBS MODERATE 35: CPT | Performed by: PHYSICIAN ASSISTANT

## 2021-12-31 PROCEDURE — 82948 REAGENT STRIP/BLOOD GLUCOSE: CPT

## 2021-12-31 PROCEDURE — 36415 COLL VENOUS BLD VENIPUNCTURE: CPT

## 2021-12-31 RX ORDER — LACTOBACILLUS RHAMNOSUS GG 10B CELL
1 CAPSULE ORAL
Status: DISCONTINUED | OUTPATIENT
Start: 2022-01-01 | End: 2022-01-01 | Stop reason: HOSPADM

## 2021-12-31 RX ORDER — LOPERAMIDE HYDROCHLORIDE 2 MG/1
2 CAPSULE ORAL 4 TIMES DAILY PRN
Status: DISCONTINUED | OUTPATIENT
Start: 2021-12-31 | End: 2022-01-01 | Stop reason: HOSPADM

## 2021-12-31 RX ADMIN — LOPERAMIDE HYDROCHLORIDE 2 MG: 2 CAPSULE ORAL at 20:30

## 2021-12-31 RX ADMIN — GABAPENTIN 300 MG: 300 CAPSULE ORAL at 10:11

## 2021-12-31 RX ADMIN — INSULIN LISPRO 4 UNITS: 100 INJECTION, SOLUTION INTRAVENOUS; SUBCUTANEOUS at 12:34

## 2021-12-31 RX ADMIN — ENOXAPARIN SODIUM 40 MG: 100 INJECTION SUBCUTANEOUS at 10:13

## 2021-12-31 RX ADMIN — ATORVASTATIN CALCIUM 40 MG: 40 TABLET, FILM COATED ORAL at 20:59

## 2021-12-31 RX ADMIN — LINEZOLID 600 MG: 600 INJECTION, SOLUTION INTRAVENOUS at 01:43

## 2021-12-31 RX ADMIN — SODIUM CHLORIDE, PRESERVATIVE FREE 10 ML: 5 INJECTION INTRAVENOUS at 10:24

## 2021-12-31 RX ADMIN — ARIPIPRAZOLE 5 MG: 5 TABLET ORAL at 10:11

## 2021-12-31 RX ADMIN — METOPROLOL TARTRATE 50 MG: 50 TABLET, FILM COATED ORAL at 20:59

## 2021-12-31 RX ADMIN — GABAPENTIN 300 MG: 300 CAPSULE ORAL at 18:06

## 2021-12-31 RX ADMIN — SODIUM CHLORIDE, PRESERVATIVE FREE 10 ML: 5 INJECTION INTRAVENOUS at 20:59

## 2021-12-31 RX ADMIN — INSULIN GLARGINE 75 UNITS: 100 INJECTION, SOLUTION SUBCUTANEOUS at 10:14

## 2021-12-31 RX ADMIN — CEFTRIAXONE SODIUM 1000 MG: 1 INJECTION, POWDER, FOR SOLUTION INTRAMUSCULAR; INTRAVENOUS at 17:48

## 2021-12-31 RX ADMIN — SODIUM CHLORIDE 25 ML: 9 INJECTION, SOLUTION INTRAVENOUS at 01:42

## 2021-12-31 RX ADMIN — GABAPENTIN 300 MG: 300 CAPSULE ORAL at 20:59

## 2021-12-31 RX ADMIN — FENOFIBRATE 160 MG: 160 TABLET ORAL at 10:10

## 2021-12-31 RX ADMIN — INSULIN LISPRO 2 UNITS: 100 INJECTION, SOLUTION INTRAVENOUS; SUBCUTANEOUS at 17:56

## 2021-12-31 RX ADMIN — LINEZOLID 600 MG: 600 INJECTION, SOLUTION INTRAVENOUS at 12:37

## 2021-12-31 RX ADMIN — LISINOPRIL 5 MG: 5 TABLET ORAL at 10:10

## 2021-12-31 RX ADMIN — INSULIN GLARGINE 75 UNITS: 100 INJECTION, SOLUTION SUBCUTANEOUS at 20:55

## 2021-12-31 RX ADMIN — INSULIN LISPRO 3 UNITS: 100 INJECTION, SOLUTION INTRAVENOUS; SUBCUTANEOUS at 20:53

## 2021-12-31 RX ADMIN — METOPROLOL TARTRATE 50 MG: 50 TABLET, FILM COATED ORAL at 10:14

## 2021-12-31 RX ADMIN — LEVOTHYROXINE SODIUM 50 MCG: 0.05 TABLET ORAL at 06:13

## 2021-12-31 ASSESSMENT — PAIN SCALES - GENERAL
PAINLEVEL_OUTOF10: 0
PAINLEVEL_OUTOF10: 0

## 2021-12-31 NOTE — PROGRESS NOTES
Hospitalist Progress Note    Patient:  Edra Hashimoto    Unit/Bed:5K-24/024-A  YOB: 1970  MRN: [id number]   Acct: [de-identified]   PCP: Celina Caballero MD  Code Status: Full Code  Date of Admission: 12/29/2021    Expected Discharge: 1-2 days  Disposition: Home. Assessment/Plan:    1. Left lower leg wound:   - Gen surg was consulted on admission; no areas of necrosis or abscess to drain. No surgical intervention at this time. Consult wound ostomy.   - ID following - continue IV Zyvox (home sertraline held to prevent serotonin syndrome) and ceftriaxone added 12/29  Plan for IV Abx for 1-2 days then home on PO Keflex. 2. IDDMII: Resume home Lantus 75U BID plus SSI. Accu-checks. Hypoglycemia protocol. Carb control diet. A1c in AM.    3. Essential HTN: BP stable, continue home meds. 4. Anxiety/depression: Home sertraline held while on Zyvox. 5. Hypothryoidism: Resume home synthroid. 6. Diabetic neuropathy: cont home gabapentin. 7. HLD: resume statin and fenofibrate. Chief Complaint: Left leg wound with drainage times several days    HPI / Hospital Course: Per HPI: \"44 y.o. male with medical history significant for diabetes type 2, diabetic neuropathy, GERD, bipolar disorder, primary hypertension; who presented to [facility] with left leg wound with drainage times several days. Patient states that he is unsure when wound appeared on his legs. Patient states he believes it was in the past few days. Patient denies any trauma to the leg. Patient states that on the day of admission, he noted significant drainage from the wound; which is what prompted him to come to the hospital.  No fever, no chills, no nausea, no vomiting, no URI or UTI type symptoms, no chest pain, no shortness of breath, no headache, no dizziness, no diarrhea, no constipation.   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 \"    Subjective (past 24 hours): No acute events overnight. Pt sitting up in chair, doing well. Denies fever/chills, sob, cp, abd pain, n/v/d. Minimal pain to left leg. ROS: Pertinent positives as noted in HPI. All other systems reviewed and negative. Past medical history, family history, social history and allergies reviewed again and is unchanged since admission. Medications:  Reviewed. Infusion Medications    dextrose      sodium chloride Stopped (12/31/21 0307)     Scheduled Medications    insulin lispro  0-12 Units SubCUTAneous TID WC    insulin lispro  0-6 Units SubCUTAneous Nightly    cefTRIAXone (ROCEPHIN) IV  1,000 mg IntraVENous Q24H    ARIPiprazole  5 mg Oral Daily    atorvastatin  40 mg Oral Nightly    fenofibrate  160 mg Oral Daily    gabapentin  300 mg Oral TID    insulin glargine  75 Units SubCUTAneous BID    levothyroxine  50 mcg Oral Daily    lisinopril  5 mg Oral Daily    metoprolol tartrate  50 mg Oral BID    [Held by provider] sertraline  50 mg Oral Daily    sodium chloride flush  5-40 mL IntraVENous 2 times per day    enoxaparin  40 mg SubCUTAneous Daily    linezolid  600 mg IntraVENous Q12H     PRN Meds: glucose, dextrose, glucagon (rDNA), dextrose, nitroGLYCERIN, oxyCODONE-acetaminophen, sodium chloride flush, sodium chloride, ondansetron **OR** ondansetron, polyethylene glycol, acetaminophen **OR** acetaminophen, potassium chloride **OR** potassium alternative oral replacement **OR** potassium chloride, magnesium sulfate    I/O:     Intake/Output Summary (Last 24 hours) at 12/31/2021 1431  Last data filed at 12/31/2021 3141  Gross per 24 hour   Intake 2640.83 ml   Output 950 ml   Net 1690.83 ml       Diet:  ADULT DIET;  Regular; 4 carb choices (60 gm/meal)    Exam:  BP (!) 122/57   Pulse 70   Temp 98.2 °F (36.8 °C) (Oral)   Resp 18   Wt 223 lb (101.2 kg)   SpO2 99%   BMI 35.99 kg/m²   General:   Pleasant, chronically ill appearing male. NAD. HEENT:  normocephalic and atraumatic. No scleral icterus. PERR. Neck: supple. No JVD. No thyromegaly. Lungs: clear to auscultation. No retractions  Cardiac: RRR without murmur. Abdomen: soft. Nontender. Bowel sounds positive. Extremities:  No clubbing, cyanosis, or edema x 4. Right AKA. Vasculature: capillary refill < 3 seconds. Palpable LE pulses bilaterally. Skin:  warm and dry. Psych:  Alert and oriented x3. Affect appropriate  Lymph:  No supraclavicular adenopathy. Neurologic:  No focal deficit. No seizures. Data: (All radiographs, tracings, PFTs, and imaging are personally viewed and interpreted unless otherwise noted)  Labs:   Recent Labs     12/29/21 1928 12/30/21  0505 12/31/21  0731   WBC 11.9* 11.2* 11.1*   HGB 15.5 13.1* 14.1   HCT 45.0 38.0* 41.5*    208 230     Recent Labs     12/29/21 1928 12/30/21  0505 12/31/21  0731   * 135 136   K 4.4 4.1 4.3   CL 97* 99 102   CO2 23 22* 21*   BUN 16 16 20   CREATININE 0.8 0.9 0.9   CALCIUM 9.9 9.0 9.3     No results for input(s): AST, ALT, BILIDIR, BILITOT, ALKPHOS in the last 72 hours. No results for input(s): INR in the last 72 hours. No results for input(s): Myrla Greulich in the last 72 hours. Urinalysis:   Lab Results   Component Value Date    NITRU NEGATIVE 12/30/2021    WBCUA 0-2 12/30/2021    BACTERIA NONE SEEN 12/30/2021    RBCUA 0-2 12/30/2021    BLOODU NEGATIVE 12/30/2021    SPECGRAV 1.025 07/27/2021    GLUCOSEU >= 1000 12/30/2021     Urine culture: No results found for: Albina Walls  Micro:   Blood culture #1:   Lab Results   Component Value Date    BC No growth-preliminary  12/29/2021     Blood culture #2:No results found for: Chilo Malin  Organism:  Lab Results   Component Value Date    ORG Staphylococcus aureus 11/30/2020         Lab Results   Component Value Date    LABGRAM  11/30/2020     No segmented neutrophils observed. Rare epithelial cells observed.  Few gram positive cocci occurring singly and in pairs. MRSA culture only:No results found for: [facility]  Respiratory culture: No results found for: CULTRESP  Aerobic and Anaerobic :  Lab Results   Component Value Date    LABAERO  11/30/2020     moderate growth In the treatment of gram positive infections, GENTAMICIN should be CONSIDERED a SYNERGYSTIC agent ONLY. Ciprofloxacin and Levofloxacin, regardless of in vitrosensitivity, should not be used for staphylococcal infectionsother than uncomplicated lower UTIs. Moxifloxacin, regardless of in vitro sensitivity, shouldnot be used for staphylococcal infections. Lab Results   Component Value Date    LABANAE  11/30/2020     No growth-preliminary No anaerobes isolated- preliminary No anaerobes isolated        Radiology Reports:  VL DUP LOWER EXTREMITY VENOUS LEFT   Final Result   Normal venous ultrasound. No evidence for acute deep venous thrombosis. **This report has been created using voice recognition software. It may contain minor errors which are inherent in voice recognition technology. **      Final report electronically signed by Dr. Beka Shepherd on 12/29/2021 8:38 PM      XR TIBIA FIBULA LEFT (2 VIEWS)   Final Result   Focal soft tissue swelling overlies the distal tibial shaft anteriorly. No fracture. No bone destruction. **This report has been created using voice recognition software. It may contain minor errors which are inherent in voice recognition technology. **      Final report electronically signed by Dr. Beka Shepherd on 12/29/2021 7:36 PM        XR TIBIA FIBULA LEFT (2 VIEWS)    Result Date: 12/29/2021  PROCEDURE: XR TIBIA FIBULA LEFT (2 VIEWS) CLINICAL INFORMATION: NKI, wound on anterior aspect of pts left tib/fib COMPARISON: 10/2/2020 TECHNIQUE: PA and lateral views of the left tibia and fibula were obtained FINDINGS: No fracture or dislocation is seen. No bone destruction is seen.  There is mild focal soft tissue swelling overlying the distal tibial shaft anteriorly. There are large Achilles and plantar calcaneal spurs. Focal soft tissue swelling overlies the distal tibial shaft anteriorly. No fracture. No bone destruction. **This report has been created using voice recognition software. It may contain minor errors which are inherent in voice recognition technology. ** Final report electronically signed by Dr. Marino Yu on 12/29/2021 7:36 PM    VL DUP LOWER EXTREMITY VENOUS LEFT    Result Date: 12/29/2021  PROCEDURE: VL DUP LOWER EXTREMITY VENOUS LEFT CLINICAL INFORMATION: swelling, pain, wound COMPARISON: 12/3/2021 TECHNIQUE: Multiple grayscale and color flow images of the major veins of the left lower extremity were obtained from the level of the groin to the level of the ankle. Multiple compression and augmentation maneuvers were performed and spectral Doppler waveforms were generated. . A few images of the contralateral common femoral vein were also obtained. FINDINGS:   All the  deep veins  are widely patent with normal flow and normal compressibility. .   The contralateral common femoral vein is unremarkable. Normal venous ultrasound. No evidence for acute deep venous thrombosis. **This report has been created using voice recognition software. It may contain minor errors which are inherent in voice recognition technology. ** Final report electronically signed by Dr. Marino Yu on 12/29/2021 8:38 PM        Tele:   [] yes             [x] no      Active Hospital Problems    Diagnosis Date Noted    Diabetic ulcer of left lower leg associated with type 2 diabetes mellitus ([facility] [address] [G29.161, L97.929] 12/29/2021    Neuropathy [G62.9] 01/22/2021    Hyperlipidemia LDL goal <70 [E78.5] 01/22/2021    Anxiety and depression [F41.9, F32.A] 06/18/2020    Type 2 diabetes mellitus with hyperglycemia, with long-term current use of insulin ([facility] [address] [E11.65, Z79.4] 02/15/2019    Hyponatremia [E87.1]     Bipolar 1 disorder ([facility] [address] [F31.9]        Electronically signed

## 2021-12-31 NOTE — PROGRESS NOTES
Progress note: Infectious diseases    Patient - Jensen Pederson,  Age - 46 y.o.    - 1970      Room Number - 2W-39/550-O   MRN -  [id number]   Acct # - [de-identified]  Date of Admission -  2021  5:14 PM    SUBJECTIVE:   He has no new complaints. OBJECTIVE   VITALS    weight is 223 lb (101.2 kg). His oral temperature is 98.2 °F (36.8 °C). His blood pressure is 122/57 (abnormal) and his pulse is 70. His respiration is 18 and oxygen saturation is 99%. Wt Readings from Last 3 Encounters:   21 223 lb (101.2 kg)   21 223 lb (101.2 kg)   21 225 lb (102.1 kg)       I/O (24 Hours)    Intake/Output Summary (Last 24 hours) at 2021 1302  Last data filed at 2021 5568  Gross per 24 hour   Intake 3624.06 ml   Output 950 ml   Net 2674.06 ml       General Appearance  Awake, alert, oriented,  not  In acute distress  HEENT - normocephalic, atraumatic, pink conjunctiva,  anicteric sclera  Neck - Supple, no mass  Lungs -  Bilateral good air entry, no rhonchi, no wheeze  Cardiovascular - Heart sounds are normal.     Abdomen - soft, not distended, nontender,   Neurologic -oriented  Skin - No bruising or bleeding  Extremities - right  AKA  The left leg is less red, the wound is clean, still swollen.     MEDICATIONS:      insulin lispro  0-12 Units SubCUTAneous TID     insulin lispro  0-6 Units SubCUTAneous Nightly    cefTRIAXone (ROCEPHIN) IV  1,000 mg IntraVENous Q24H    ARIPiprazole  5 mg Oral Daily    atorvastatin  40 mg Oral Nightly    fenofibrate  160 mg Oral Daily    gabapentin  300 mg Oral TID    insulin glargine  75 Units SubCUTAneous BID    levothyroxine  50 mcg Oral Daily    lisinopril  5 mg Oral Daily    metoprolol tartrate  50 mg Oral BID    [Held by provider] sertraline  50 mg Oral Daily    sodium chloride flush  5-40 mL IntraVENous 2 times per day    enoxaparin  40 mg SubCUTAneous Daily    linezolid  600 mg IntraVENous Q12H      dextrose      sodium chloride Stopped (12/31/21 0307)     glucose, dextrose, glucagon (rDNA), dextrose, nitroGLYCERIN, oxyCODONE-acetaminophen, sodium chloride flush, sodium chloride, ondansetron **OR** ondansetron, polyethylene glycol, acetaminophen **OR** acetaminophen, potassium chloride **OR** potassium alternative oral replacement **OR** potassium chloride, magnesium sulfate      LABS:     CBC:   Recent Labs     12/29/21 1928 12/30/21  0505 12/31/21  0731   WBC 11.9* 11.2* 11.1*   HGB 15.5 13.1* 14.1    208 230     BMP:    Recent Labs     12/29/21 1928 12/30/21  0505 12/31/21  0731   * 135 136   K 4.4 4.1 4.3   CL 97* 99 102   CO2 23 22* 21*   BUN 16 16 20   CREATININE 0.8 0.9 0.9   GLUCOSE 282* 320* 168*     Calcium:  Recent Labs     12/31/21  0731   CALCIUM 9.3      Recent Labs     12/30/21 1925 12/31/21  0733 12/31/21  1128   POCGLU 258* 154* 233*      Recent Labs     12/30/21 1941   LACTA 1.9     Lactic Acid:   Recent Labs     12/30/21 1941   LACTA 1.9     Troponin: No results for input(s): CKTOTAL, CKMB, TROPONINI in the last 72 hours. BNP: No results for input(s): BNP in the last 72 hours.     CULTURES:   UA:   Recent Labs     12/30/21  0125   PHUR 5.0   COLORU YELLOW   PROTEINU TRACE*   BLOODU NEGATIVE   RBCUA 0-2   WBCUA 0-2   BACTERIA NONE SEEN   NITRU NEGATIVE   GLUCOSEU >= 1000*   BILIRUBINUR NEGATIVE   UROBILINOGEN 0.2   KETUA NEGATIVE     Micro:   Lab Results   Component Value Date    BC No growth-preliminary  12/29/2021          Problem list of patient:     Patient Active Problem List   Diagnosis Code    Status post below knee amputation of right lower extremity ([facility] [address] [facility] Z89.511    Gait disturbance R26.9    Sebaceous cyst L72.3    Uncontrolled type 2 diabetes mellitus with hyperglycemia, with long-term current use of insulin ([facility]) E11.65, Z79.4    Severe bipolar II disorder, recent episode major depressive, remission (HCC) F31.81    Bipolar 1 disorder (HCC) F31.9    Leukocytosis D72.829    Lactic acidosis E87.2    Hyponatremia E87.1    Normocytic anemia D64.9    Obesity (BMI 30-39. 9) E66.9    History of noncompliance with medical treatment Z91.19    Essential hypertension I10    Tobacco dependence F17.200    Gastroesophageal reflux disease without esophagitis K21.9    History of marijuana use Z87.898    Physical debility R53.81    Anemia D64.9    Electrolyte imbalance E87.8    Type 2 diabetes mellitus with hyperglycemia, with long-term current use of insulin ([facility]) E11.65, Z79.4    Weakness R53.1    Infection of above knee amputation stump of right leg ([facility]) T87.43    Above knee amputation of right lower extremity ([facility]) Z56.189Q    Sepsis ([facility]) A41.9    Vitamin D deficiency E55.9    COPD exacerbation ([facility]) J44.1    Influenza B J10.1    Depression, recurrent ([facility]) F33.9    Major depressive disorder, recurrent ([facility]) F33.9    GI bleed K92.2    Elevated lipase R74.8    Other psychoactive substance abuse, uncomplicated ([facility]) O84.92    Calculus of gallbladder without cholecystitis without obstruction K80.20    Right upper quadrant abdominal pain R10.11    Pedal edema R60.0    MURALI (obstructive sleep apnea) G47.33    Shortness of breath R06.02    Hypothyroid E03.9    Anxiety and depression F41.9, F32. A    Dyspnea R06.00    Sinus tachycardia X31.8    Metabolic acidosis G89.2    Edema of left lower extremity R60.0    SOB (shortness of breath) on exertion R06.02    Intermittent palpitations R00.2    History of chest pain Z87.898    Dizziness, nonspecific R42    Hyperlipidemia LDL goal <70 E78.5    Neuropathy G62.9    Diabetic ulcer of left lower leg associated with type 2 diabetes mellitus ([facility]) E11.622, R10.321         ASSESSMENT/PLAN   Left leg cellulites: much improved, continue compression wrap and cover the wound with gauze and kerlix  Discharge plan in 1-2 days with oral leland Bean MD, MD, [address] 12/31/2021 1:02 PM

## 2022-01-01 VITALS
WEIGHT: 223 LBS | BODY MASS INDEX: 35.99 KG/M2 | HEART RATE: 70 BPM | OXYGEN SATURATION: 99 % | DIASTOLIC BLOOD PRESSURE: 55 MMHG | SYSTOLIC BLOOD PRESSURE: 96 MMHG | RESPIRATION RATE: 18 BRPM | TEMPERATURE: 97.8 F

## 2022-01-01 LAB
ANION GAP SERPL CALCULATED.3IONS-SCNC: 14 MEQ/L (ref 8–16)
AVERAGE GLUCOSE: 153 MG/DL (ref 70–126)
BUN BLDV-MCNC: 20 MG/DL (ref 7–22)
CALCIUM SERPL-MCNC: 9.4 MG/DL (ref 8.5–10.5)
CHLORIDE BLD-SCNC: 98 MEQ/L (ref 98–111)
CO2: 22 MEQ/L (ref 23–33)
CREAT SERPL-MCNC: 0.9 MG/DL (ref 0.4–1.2)
ERYTHROCYTE [DISTWIDTH] IN BLOOD BY AUTOMATED COUNT: 14.8 % (ref 11.5–14.5)
ERYTHROCYTE [DISTWIDTH] IN BLOOD BY AUTOMATED COUNT: 48.9 FL (ref 35–45)
GFR SERPL CREATININE-BSD FRML MDRD: 88 ML/MIN/1.73M2
GLUCOSE BLD-MCNC: 209 MG/DL (ref 70–108)
GLUCOSE BLD-MCNC: 248 MG/DL (ref 70–108)
GLUCOSE BLD-MCNC: 281 MG/DL (ref 70–108)
HBA1C MFR BLD: 7.1 % (ref 4.4–6.4)
HCT VFR BLD CALC: 40.2 % (ref 42–52)
HEMOGLOBIN: 13.7 GM/DL (ref 14–18)
MCH RBC QN AUTO: 31.1 PG (ref 26–33)
MCHC RBC AUTO-ENTMCNC: 34.1 GM/DL (ref 32.2–35.5)
MCV RBC AUTO: 91.2 FL (ref 80–94)
PLATELET # BLD: 229 THOU/MM3 (ref 130–400)
PMV BLD AUTO: 9.7 FL (ref 9.4–12.4)
POTASSIUM SERPL-SCNC: 4.8 MEQ/L (ref 3.5–5.2)
RBC # BLD: 4.41 MILL/MM3 (ref 4.7–6.1)
SODIUM BLD-SCNC: 134 MEQ/L (ref 135–145)
WBC # BLD: 10.1 THOU/MM3 (ref 4.8–10.8)

## 2022-01-01 PROCEDURE — 6360000002 HC RX W HCPCS: Performed by: HOSPITALIST

## 2022-01-01 PROCEDURE — 6370000000 HC RX 637 (ALT 250 FOR IP): Performed by: INTERNAL MEDICINE

## 2022-01-01 PROCEDURE — 6370000000 HC RX 637 (ALT 250 FOR IP): Performed by: HOSPITALIST

## 2022-01-01 PROCEDURE — 80048 BASIC METABOLIC PNL TOTAL CA: CPT

## 2022-01-01 PROCEDURE — 2580000003 HC RX 258: Performed by: HOSPITALIST

## 2022-01-01 PROCEDURE — 85027 COMPLETE CBC AUTOMATED: CPT

## 2022-01-01 PROCEDURE — 36415 COLL VENOUS BLD VENIPUNCTURE: CPT

## 2022-01-01 PROCEDURE — 82948 REAGENT STRIP/BLOOD GLUCOSE: CPT

## 2022-01-01 PROCEDURE — 83036 HEMOGLOBIN GLYCOSYLATED A1C: CPT

## 2022-01-01 PROCEDURE — 99239 HOSP IP/OBS DSCHRG MGMT >30: CPT | Performed by: PHYSICIAN ASSISTANT

## 2022-01-01 RX ORDER — CEPHALEXIN 500 MG/1
500 CAPSULE ORAL 4 TIMES DAILY
Qty: 28 CAPSULE | Refills: 0 | Status: SHIPPED | OUTPATIENT
Start: 2022-01-01 | End: 2022-01-08

## 2022-01-01 RX ADMIN — FENOFIBRATE 160 MG: 160 TABLET ORAL at 08:11

## 2022-01-01 RX ADMIN — LINEZOLID 600 MG: 600 INJECTION, SOLUTION INTRAVENOUS at 01:57

## 2022-01-01 RX ADMIN — ENOXAPARIN SODIUM 40 MG: 100 INJECTION SUBCUTANEOUS at 09:13

## 2022-01-01 RX ADMIN — GABAPENTIN 300 MG: 300 CAPSULE ORAL at 08:11

## 2022-01-01 RX ADMIN — Medication 1 CAPSULE: at 08:11

## 2022-01-01 RX ADMIN — LEVOTHYROXINE SODIUM 50 MCG: 0.05 TABLET ORAL at 06:43

## 2022-01-01 RX ADMIN — LOPERAMIDE HYDROCHLORIDE 2 MG: 2 CAPSULE ORAL at 10:08

## 2022-01-01 RX ADMIN — ARIPIPRAZOLE 5 MG: 5 TABLET ORAL at 10:07

## 2022-01-01 RX ADMIN — INSULIN GLARGINE 75 UNITS: 100 INJECTION, SOLUTION SUBCUTANEOUS at 08:15

## 2022-01-01 RX ADMIN — SODIUM CHLORIDE, PRESERVATIVE FREE 10 ML: 5 INJECTION INTRAVENOUS at 08:12

## 2022-01-01 RX ADMIN — INSULIN LISPRO 6 UNITS: 100 INJECTION, SOLUTION INTRAVENOUS; SUBCUTANEOUS at 12:05

## 2022-01-01 NOTE — DISCHARGE SUMMARY
Hospitalist Discharge Summary    Patient: Constantino Wall  YOB: 1970  MRN: [id number]   Acct: [de-identified]    Primary Care Physician: Tonny Curry MD    Admit date  12/29/2021    Discharge date: 1/1/2022     Discharge Assessment and Plan:    1. Left lower leg wound:   - Gen surg was consulted on admission; no areas of necrosis or abscess to drain. No surgical intervention at this time. Wound ostomy consulted. - ID following - Patient treated with IV Zyvox (home sertraline held to prevent serotonin syndrome) and ceftriaxone x 3 days. Continue PO Kelfex at discharge per ID recs. Follow up with wound ostomy / PCP at discharge.        2. IDDMII: Resume home insulin regimen - pt takes Lantus 80U BID plus 40U Humalog tidwc with SSI. Follow up with PCP for continued diabetes management.      3. Essential HTN: BP stable, continue home meds.      4. Anxiety/depression: Home sertraline was held while on Zyvox.       5. Hypothryoidism: Resume home synthroid.      6. Diabetic neuropathy: cont home gabapentin.      7. HLD: resume statin and fenofibrate. Chief Complaint on presentation: Left leg wound with drainage times several days    Initial H&P / Hospital Course: Per HPI: \"44 y. o. male with medical history significant for diabetes type 2, diabetic neuropathy, GERD, bipolar disorder, primary hypertension; who presented to [address] [place] [address] with left leg wound with drainage times several days.  Patient states that he is unsure when wound appeared on his legs.  Patient states he believes it was in the past few days.  Patient denies any trauma to the leg.  Patient states that on the day of admission, he noted significant drainage from the wound; which is what prompted him to come to the hospital.  No fever, no chills, no nausea, no vomiting, no URI or UTI type symptoms, no chest pain, no shortness of breath, no headache, no dizziness, no diarrhea, no constipation.  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 \"     Subjective (day of discharge): No acute events overnight. Pt is doing well. No fever, leg pain is minimal. Denies fever/chills, sob, cp, abd pain, nvd. Patient responded well to medical management. Consultants had signed off or were contacted and agree with discharge plan. The patient was discharged in stable condition with appropriate outpatient follow up arranged. Physical Exam:-  Vitals: Patient Vitals for the past 24 hrs:   BP Temp Temp src Pulse Resp SpO2   01/01/22 0802 (!) 96/55 97.8 °F (36.6 °C) Oral 70 18 99 %   01/01/22 0400 (!) 104/57 98.4 °F (36.9 °C) Oral 78 18 96 %   12/31/21 2010 138/63 98.2 °F (36.8 °C) Oral 71 18 97 %     Weight: Weight: 223 lb (101.2 kg)   24 hour intake/output:     Intake/Output Summary (Last 24 hours) at 1/1/2022 1621  Last data filed at 12/31/2021 1843  Gross per 24 hour   Intake 580 ml   Output --   Net 580 ml       General appearance: No apparent distress, appears stated age and cooperative. HEENT: Normal cephalic, atraumatic without obvious deformity. Pupils equal, round, and reactive to light. Extra ocular muscles intact. Conjunctivae/corneas clear. Neck: Supple, with full range of motion. No jugular venous distention. Trachea midline. Respiratory:  Normal respiratory effort. Clear to auscultation, bilaterally without Rales/Wheezes/Rhonchi. Cardiovascular: Regular rate and rhythm with normal S1/S2 without murmurs, rubs or gallops. Abdomen: Soft, non-tender, non-distended with normal bowel sounds. Musculoskeletal:  No clubbing, cyanosis or edema bilaterally. R AKA. Left LE wound. Skin: Skin color, texture, turgor normal.  No rashes or lesions. Neurologic:  Neurovascularly intact without any focal sensory/motor deficits.  Cranial nerves: II-XII intact, grossly non-focal.  Psychiatric: Alert and oriented, thought content appropriate, normal insight  Capillary Refill: Brisk,< 3 seconds   Peripheral Pulses: +2 palpable, equal bilaterally     Labs :  Recent Results (from the past 72 hour(s))   Basic Metabolic Panel    Collection Time: 12/29/21  7:28 PM   Result Value Ref Range    Sodium 132 (L) 135 - 145 meq/L    Potassium 4.4 3.5 - 5.2 meq/L    Chloride 97 (L) 98 - 111 meq/L    CO2 23 23 - 33 meq/L    Glucose 282 (H) 70 - 108 mg/dL    BUN 16 7 - 22 mg/dL    CREATININE 0.8 0.4 - 1.2 mg/dL    Calcium 9.9 8.5 - 10.5 mg/dL   CBC Auto Differential    Collection Time: 12/29/21  7:28 PM   Result Value Ref Range    WBC 11.9 (H) 4.8 - 10.8 thou/mm3    RBC 4.95 4.70 - 6.10 mill/mm3    Hemoglobin 15.5 14.0 - 18.0 gm/dl    Hematocrit 45.0 42.0 - 52.0 %    MCV 90.9 80.0 - 94.0 fL    MCH 31.3 26.0 - 33.0 pg    MCHC 34.4 32.2 - 35.5 gm/dl    RDW-CV 14.7 (H) 11.5 - 14.5 %    RDW-SD 48.4 (H) 35.0 - 45.0 fL    Platelets 256 654 - 383 thou/mm3    MPV 9.1 (L) 9.4 - 12.4 fL    Seg Neutrophils 62.9 %    Lymphocytes 22.3 %    Monocytes 7.9 %    Eosinophils 5.3 %    Basophils 0.8 %    Immature Granulocytes 0.8 %    Segs Absolute 7.5 1.8 - 7.7 thou/mm3    Lymphocytes Absolute 2.7 1.0 - 4.8 thou/mm3    Monocytes Absolute 0.9 0.4 - 1.3 thou/mm3    Eosinophils Absolute 0.6 (H) 0.0 - 0.4 thou/mm3    Basophils Absolute 0.1 0.0 - 0.1 thou/mm3    Immature Grans (Abs) 0.09 (H) 0.00 - 0.07 thou/mm3    nRBC 0 /100 wbc   Lactic Acid, Plasma    Collection Time: 12/29/21  7:28 PM   Result Value Ref Range    Lactic Acid 2.2 (H) 0.5 - 2.0 mmol/L   Procalcitonin    Collection Time: 12/29/21  7:28 PM   Result Value Ref Range    Procalcitonin < 0.02 0.01 - 0.09 ng/mL   Lipase    Collection Time: 12/29/21  7:28 PM   Result Value Ref Range    Lipase 232.7 (H) 5.6 - 51.3 U/L   Culture, Blood 1    Collection Time: 12/29/21  7:28 PM    Specimen: Blood   Result Value Ref Range    Blood Culture, Routine No growth-preliminary     Anion Gap    Collection Time: 12/29/21  7:28 PM   Result Value Ref Range    Anion Gap 12.0 8.0 - 16.0 meq/L   Glomerular Filtration Rate, Estimated    Collection Time: 12/29/21  7:28 PM   Result Value Ref Range    Est, Glom Filt Rate >90 ml/min/1.73m2   Osmolality    Collection Time: 12/29/21  7:28 PM   Result Value Ref Range    Osmolality Calc 275.9 275.0 - 300.0 mOsmol/kg   Culture, Blood 2    Collection Time: 12/29/21  7:36 PM    Specimen: Blood   Result Value Ref Range    Blood Culture, Routine No growth-preliminary     POCT Glucose    Collection Time: 12/29/21 11:48 PM   Result Value Ref Range    POC Glucose 192 (H) 70 - 108 mg/dl   Urine with Reflexed Micro    Collection Time: 12/30/21  1:25 AM   Result Value Ref Range    Glucose, Ur >= 1000 (A) NEGATIVE mg/dl    Bilirubin Urine NEGATIVE NEGATIVE    Ketones, Urine NEGATIVE NEGATIVE    Specific Gravity, Urine 1.025 1.002 - 1.030    Blood, Urine NEGATIVE NEGATIVE    pH, UA 5.0 5.0 - 9.0    Protein, UA TRACE (A) NEGATIVE    Urobilinogen, Urine 0.2 0.0 - 1.0 eu/dl    Nitrite, Urine NEGATIVE NEGATIVE    Leukocyte Esterase, Urine NEGATIVE NEGATIVE    Color, UA YELLOW STRAW-YELLOW    Character, Urine CLEAR CLEAR-SL CLOUD    RBC, UA 0-2 0-2/hpf /hpf    WBC, UA 0-2 0-4/hpf /hpf    Epithelial Cells, UA NONE SEEN 3-5/hpf /hpf    Bacteria, UA NONE SEEN FEW/NONE SEEN /hpf    Casts UA NONE SEEN NONE SEEN /lpf    Crystals, UA NONE SEEN NONE SEEN    Renal Epithelial, UA NONE SEEN NONE SEEN    Yeast, UA NONE SEEN NONE SEEN    CASTS 2 NONE SEEN NONE SEEN /lpf    MISCELLANEOUS 2 NONE SEEN    Basic Metabolic Panel w/ Reflex to MG    Collection Time: 12/30/21  5:05 AM   Result Value Ref Range    Sodium 135 135 - 145 meq/L    Potassium reflex Magnesium 4.1 3.5 - 5.2 meq/L    Chloride 99 98 - 111 meq/L    CO2 22 (L) 23 - 33 meq/L    Glucose 320 (H) 70 - 108 mg/dL    BUN 16 7 - 22 mg/dL    CREATININE 0.9 0.4 - 1.2 mg/dL    Calcium 9.0 8.5 - 10.5 mg/dL   CBC auto differential    Collection Time: 12/30/21  5:05 AM   Result Value Ref Range    WBC 11.2 (H) 4.8 - 10.8 thou/mm3    RBC 4.15 (L) 4.70 - 6.10 mill/mm3    Hemoglobin 13.1 (L) 14.0 - 18.0 gm/dl    Hematocrit 38.0 (L) 42.0 - 52.0 %    MCV 91.6 80.0 - 94.0 fL    MCH 31.6 26.0 - 33.0 pg    MCHC 34.5 32.2 - 35.5 gm/dl    RDW-CV 14.6 (H) 11.5 - 14.5 %    RDW-SD 49.2 (H) 35.0 - 45.0 fL    Platelets 008 958 - 366 thou/mm3    MPV 9.6 9.4 - 12.4 fL    Seg Neutrophils 62.6 %    Lymphocytes 22.2 %    Monocytes 8.8 %    Eosinophils 5.3 %    Basophils 0.5 %    Immature Granulocytes 0.6 %    Segs Absolute 7.0 1.8 - 7.7 thou/mm3    Lymphocytes Absolute 2.5 1.0 - 4.8 thou/mm3    Monocytes Absolute 1.0 0.4 - 1.3 thou/mm3    Eosinophils Absolute 0.6 (H) 0.0 - 0.4 thou/mm3    Basophils Absolute 0.1 0.0 - 0.1 thou/mm3    Immature Grans (Abs) 0.07 0.00 - 0.07 thou/mm3    nRBC 0 /100 wbc   Anion Gap    Collection Time: 12/30/21  5:05 AM   Result Value Ref Range    Anion Gap 14.0 8.0 - 16.0 meq/L   Glomerular Filtration Rate, Estimated    Collection Time: 12/30/21  5:05 AM   Result Value Ref Range    Est, Glom Filt Rate 88 (A) ml/min/1.73m2   POCT glucose    Collection Time: 12/30/21  8:05 AM   Result Value Ref Range    POC Glucose 256 (H) 70 - 108 mg/dl   POCT glucose    Collection Time: 12/30/21 11:53 AM   Result Value Ref Range    POC Glucose 266 (H) 70 - 108 mg/dl   POCT glucose    Collection Time: 12/30/21  3:10 PM   Result Value Ref Range    POC Glucose 299 (H) 70 - 108 mg/dl   Lactic acid, plasma    Collection Time: 12/30/21  5:13 PM   Result Value Ref Range    Lactic Acid 2.4 (H) 0.5 - 2.0 mmol/L   POCT Glucose    Collection Time: 12/30/21  7:25 PM   Result Value Ref Range    POC Glucose 258 (H) 70 - 108 mg/dl   Lactic Acid, Plasma    Collection Time: 12/30/21  7:41 PM   Result Value Ref Range    Lactic Acid 1.9 0.5 - 2.0 mmol/L   Basic Metabolic Panel    Collection Time: 12/31/21  7:31 AM   Result Value Ref Range    Sodium 136 135 - 145 meq/L    Potassium 4.3 3.5 - 5.2 meq/L    Chloride 102 98 - 111 meq/L    CO2 21 (L) 23 - 33 meq/L    Glucose 168 (H) 70 - 108 mg/dL    BUN 20 7 - 22 mg/dL    CREATININE 0.9 0.4 - 1.2 mg/dL    Calcium 9.3 8.5 - 10.5 mg/dL   CBC    Collection Time: 12/31/21  7:31 AM   Result Value Ref Range    WBC 11.1 (H) 4.8 - 10.8 thou/mm3    RBC 4.50 (L) 4.70 - 6.10 mill/mm3    Hemoglobin 14.1 14.0 - 18.0 gm/dl    Hematocrit 41.5 (L) 42.0 - 52.0 %    MCV 92.2 80.0 - 94.0 fL    MCH 31.3 26.0 - 33.0 pg    MCHC 34.0 32.2 - 35.5 gm/dl    RDW-CV 14.7 (H) 11.5 - 14.5 %    RDW-SD 49.2 (H) 35.0 - 45.0 fL    Platelets 634 943 - 425 thou/mm3    MPV 9.7 9.4 - 12.4 fL   Anion Gap    Collection Time: 12/31/21  7:31 AM   Result Value Ref Range    Anion Gap 13.0 8.0 - 16.0 meq/L   Glomerular Filtration Rate, Estimated    Collection Time: 12/31/21  7:31 AM   Result Value Ref Range    Est, Glom Filt Rate 88 (A) ml/min/1.73m2   POCT Glucose    Collection Time: 12/31/21  7:33 AM   Result Value Ref Range    POC Glucose 154 (H) 70 - 108 mg/dl   POCT Glucose    Collection Time: 12/31/21 11:28 AM   Result Value Ref Range    POC Glucose 233 (H) 70 - 108 mg/dl   POCT glucose    Collection Time: 12/31/21  4:41 PM   Result Value Ref Range    POC Glucose 237 (H) 70 - 108 mg/dl   POCT glucose    Collection Time: 12/31/21  7:30 PM   Result Value Ref Range    POC Glucose 266 (H) 70 - 108 mg/dl   POCT glucose    Collection Time: 12/31/21  8:32 PM   Result Value Ref Range    POC Glucose 263 (H) 70 - 108 mg/dl   POCT glucose    Collection Time: 01/01/22  7:37 AM   Result Value Ref Range    POC Glucose 209 (H) 70 - 108 mg/dl   Basic Metabolic Panel    Collection Time: 01/01/22 10:26 AM   Result Value Ref Range    Sodium 134 (L) 135 - 145 meq/L    Potassium 4.8 3.5 - 5.2 meq/L    Chloride 98 98 - 111 meq/L    CO2 22 (L) 23 - 33 meq/L    Glucose 248 (H) 70 - 108 mg/dL    BUN 20 7 - 22 mg/dL    CREATININE 0.9 0.4 - 1.2 mg/dL    Calcium 9.4 8.5 - 10.5 mg/dL   CBC    Collection Time: 01/01/22 10:26 AM   Result Value Ref Range    WBC 10.1 4.8 - 10.8 thou/mm3    RBC 4.41 (L) 4.70 - 6.10 mill/mm3    Hemoglobin 13.7 (L) 14.0 - 18.0 gm/dl    Hematocrit 40.2 (L) 42.0 - 52.0 %    MCV 91.2 80.0 - 94.0 fL    MCH 31.1 26.0 - 33.0 pg    MCHC 34.1 32.2 - 35.5 gm/dl    RDW-CV 14.8 (H) 11.5 - 14.5 %    RDW-SD 48.9 (H) 35.0 - 45.0 fL    Platelets 006 217 - 668 thou/mm3    MPV 9.7 9.4 - 12.4 fL   Anion Gap    Collection Time: 01/01/22 10:26 AM   Result Value Ref Range    Anion Gap 14.0 8.0 - 16.0 meq/L   Glomerular Filtration Rate, Estimated    Collection Time: 01/01/22 10:26 AM   Result Value Ref Range    Est, Glom Filt Rate 88 (A) ml/min/1.73m2   POCT glucose    Collection Time: 01/01/22 11:45 AM   Result Value Ref Range    POC Glucose 281 (H) 70 - 108 mg/dl        Microbiology:    Blood culture #1:   Lab Results   Component Value Date    BC No growth-preliminary  12/29/2021     Blood culture #2:No results found for: BLOODCULT2  Organism:    Lab Results   Component Value Date    LABGRAM  11/30/2020     No segmented neutrophils observed. Rare epithelial cells observed. Few gram positive cocci occurring singly and in pairs. MRSA culture only:No results found for: [facility]  Urine culture: No results found for: Yael Big  Lab Results   Component Value Date    ORG Staphylococcus aureus 11/30/2020      Respiratory culture: No results found for: CULTRESP  Aerobic and Anaerobic :  Lab Results   Component Value Date    LABAERO  11/30/2020     moderate growth In the treatment of gram positive infections, GENTAMICIN should be CONSIDERED a SYNERGYSTIC agent ONLY. Ciprofloxacin and Levofloxacin, regardless of in vitrosensitivity, should not be used for staphylococcal infectionsother than uncomplicated lower UTIs. Moxifloxacin, regardless of in vitro sensitivity, shouldnot be used for staphylococcal infections.      Lab Results   Component Value Date    LABANAE  11/30/2020     No growth-preliminary No anaerobes isolated- preliminary No anaerobes isolated        Urinalysis:     Lab Results Component Value Date    NITRU NEGATIVE 12/30/2021    WBCUA 0-2 12/30/2021    BACTERIA NONE SEEN 12/30/2021    RBCUA 0-2 12/30/2021    BLOODU NEGATIVE 12/30/2021    SPECGRAV 1.025 07/27/2021    GLUCOSEU >= 1000 12/30/2021       Radiology:  XR TIBIA FIBULA LEFT (2 VIEWS)    Result Date: 12/29/2021  PROCEDURE: XR TIBIA FIBULA LEFT (2 VIEWS) CLINICAL INFORMATION: NKI, wound on anterior aspect of pts left tib/fib COMPARISON: 10/2/2020 TECHNIQUE: PA and lateral views of the left tibia and fibula were obtained FINDINGS: No fracture or dislocation is seen. No bone destruction is seen. There is mild focal soft tissue swelling overlying the distal tibial shaft anteriorly. There are large Achilles and plantar calcaneal spurs. Focal soft tissue swelling overlies the distal tibial shaft anteriorly. No fracture. No bone destruction. **This report has been created using voice recognition software. It may contain minor errors which are inherent in voice recognition technology. ** Final report electronically signed by Dr. Je Lira on 12/29/2021 7:36 PM    VL DUP LOWER EXTREMITY VENOUS LEFT    Result Date: 12/29/2021  PROCEDURE: VL DUP LOWER EXTREMITY VENOUS LEFT CLINICAL INFORMATION: swelling, pain, wound COMPARISON: 12/3/2021 TECHNIQUE: Multiple grayscale and color flow images of the major veins of the left lower extremity were obtained from the level of the groin to the level of the ankle. Multiple compression and augmentation maneuvers were performed and spectral Doppler waveforms were generated. . A few images of the contralateral common femoral vein were also obtained. FINDINGS:   All the  deep veins  are widely patent with normal flow and normal compressibility. .   The contralateral common femoral vein is unremarkable. Normal venous ultrasound. No evidence for acute deep venous thrombosis. **This report has been created using voice recognition software.   It may contain minor errors which are inherent in voice recognition technology. ** Final report electronically signed by Dr. Je Lira on 12/29/2021 8:38 PM       Consults:   [address] TO INFECTIOUS DISEASES  IP CONSULT TO SOCIAL WORK    Discharge Medications:      Medication List      START taking these medications    cephALEXin 500 MG capsule  Commonly known as: KEFLEX  Take 1 capsule by mouth 4 times daily for 7 days        CHANGE how you take these medications    Insulin Glargine (2 Unit Dial) 300 UNIT/ML Sopn  Change to 75 units BID and every 10 days increase by 5 units both times, to get am sugars 150-200  What changed: additional instructions        CONTINUE taking these medications    ARIPiprazole 5 MG tablet  Commonly known as: ABILIFY     atorvastatin 40 MG tablet  Commonly known as: LIPITOR  Take 1 tablet by mouth nightly     CALCIUM/D3 ADULT GUMMIES PO     Dexcom G6  Lelia  1 Device by Does not apply route 4 times daily (before meals and nightly)     Dexcom G6 Sensor Misc  1 Device by Does not apply route every 14 days     Dexcom G6 Transmitter Misc  1 Device by Does not apply route every 3 months     fenofibrate 145 MG tablet  Commonly known as: TRICOR  Take 1 tablet by mouth daily     gabapentin 300 MG capsule  Commonly known as: NEURONTIN  TAKE 1 CAPSULE BY MOUTH THREE TIMES DAILY.      insulin lispro 200 UNIT/ML Sopn pen  Commonly known as: HUMALOG KWIKPEN U-200  40 units Plus scale - Max dose 160 units per day     Jardiance 25 MG tablet  Generic drug: empagliflozin  Take 1 tablet by mouth daily New dose     levothyroxine 50 MCG tablet  Commonly known as: SYNTHROID  Take 1 tablet by mouth daily     lisinopril 5 MG tablet  Commonly known as: PRINIVIL;ZESTRIL  Take 1 tablet by mouth daily     metoprolol tartrate 50 MG tablet  Commonly known as: Lopressor  Take 1 tablet by mouth 2 times daily     nitroGLYCERIN 0.3 MG SL tablet  Commonly known as: Nitrostat  Place 1 tablet under the tongue every 5 minutes as needed for Chest pain up to max of 3 total doses. If no relief after 1 dose, call 911. OmniPod Dash 5 Pack Pods Misc  CHANGE PODS EVERY 48 HOURS AS DIRECTED     Pen Needles 31G X 8 MM Misc  For use with insulin injections five timed per day dx:E11.9     sertraline 50 MG tablet  Commonly known as: ZOLOFT     therapeutic multivitamin-minerals tablet           Where to Get Your Medications      These [facility] to [facility] [address]  [address], [address] [facility]    Phone: [phone]   · cephALEXin 500 MG capsule          Patient Instructions:    Discharge lab work: none  Activity: activity as tolerated and no driving for today  Diet: No diet orders on file      Follow-up visits:   Laurie Arce MD  [place] [address]  [phone]      Call to schedule follow up appointment within 1 week of discharge. [place] [address] [id number] [facility]  [address]   [phone]      Follow up as needed    Zachary Jacinto MD  [address] [place] [address] [address] [facility] [address]  [phone]      Call to schedule follow up appointment with Dr. Jerri Minaya. Disposition: home  Condition at Discharge: Stable    Time Spent: 45 minutes    Signed: Thank you Laurie Arce MD for the opportunity to be involved in this patient's care.     Electronically signed by Emy Nelson PA-C on 1/1/2022 at 4:21 PM  Discharging Hospitalist

## 2022-01-03 ENCOUNTER — CARE COORDINATION (OUTPATIENT)
Dept: CASE MANAGEMENT | Age: 52
End: 2022-01-03

## 2022-01-03 ENCOUNTER — TELEPHONE (OUTPATIENT)
Dept: INTERNAL MEDICINE CLINIC | Age: 52
End: 2022-01-03

## 2022-01-03 DIAGNOSIS — L97.929 DIABETIC ULCER OF LEFT LOWER LEG ASSOCIATED WITH TYPE 2 DIABETES MELLITUS (HCC): Primary | ICD-10-CM

## 2022-01-03 DIAGNOSIS — E11.622 DIABETIC ULCER OF LEFT LOWER LEG ASSOCIATED WITH TYPE 2 DIABETES MELLITUS (HCC): Primary | ICD-10-CM

## 2022-01-03 PROCEDURE — 1111F DSCHRG MED/CURRENT MED MERGE: CPT | Performed by: INTERNAL MEDICINE

## 2022-01-03 NOTE — TELEPHONE ENCOUNTER
----- Message from Jensen Rasmussen sent at 1/3/2022  2:39 PM EST -----  Subject: Message to Provider    QUESTIONS  Information for Provider? Patient is diabetic with ulcers and experiencing   bleeding. Patient needs two days prior to appt for transportation  ---------------------------------------------------------------------------  --------------  CALL BACK INFO  What is the best way for the office to contact you? OK to leave message on   voicemail  Preferred Call Back Phone Number?  [phone]  ---------------------------------------------------------------------------  --------------  SCRIPT ANSWERS  undefined

## 2022-01-03 NOTE — CARE COORDINATION
Lanie 45 Transitions Initial Follow Up Call    Call within 2 business days of discharge: Yes    Patient: Josselyn Sena Patient : 1970   MRN: [id number]  Reason for Admission: Diabetic ulcer of left lower leg associated with type 2 diabetes / Right Leg AKA   Discharge Date: 22 RARS: Readmission Risk Score: 14.4 ( )      Last Discharge [facility]       Complaint Diagnosis Description Type Department Provider    21 Leg Edema & painful;(Left)  Leaking Pus per pt  Diabetic ulcer of left shin ([address] . .. ED to Hosp-Admission (Discharged) (ADMITTED) BLANK 5K Nakita Martins PA-C; Мария Macias... Transitions of Care Initial Call:  Explained the role of Care Transition Nurse and the Transition program, patient is agreeable to follow up calls Post discharge from the [facility] and spoke to patient. Patient states he is feeling better. Left shin ulcer is bleeding and patient states he has no dressing material to cover it. He states there isn't a lot of blood and can wipe it off with a towel. Instructed not to wipe area with any clothe that is not clean. If bleeding worsens go to ER. Patient expresses understanding. Called PCP and spoke to [address] [place] [address] who put in a message to doctor for appointment this week. (patient has appointment scheduled for 22). [address] [place] [address] states that patient will be called back by office. Patient aware. Will also send message to PCP. Patient states he needs 2 days for transportation. Patient also states [facility] and hasn't received any calls. Regis Gonzalez 105 and spoke to [facility]. She states they were not called and informed that the patient was discharged. They have order and when patient situation explained they will have someone there tomorrow. Patient aware. Blood sugar 190 this afternoon. Patient has no symptoms of hyperglycemia or hypoglycemia. Taking all Diabetic medications as directed.  Has Dexacon Glucose Meter.    Reviewed medications with Patient. Confirmed Patient obtained and is taking medications as directed. There are no questions concerning medications at this time. Medication education provided needed, questions answered, verbalizes understanding. Stressed importance of medication compliance. Advised contacting Pharmacy/MD for refill needs. Expresses understanding. 1111F Medication Reconciliation completed. Routed to PCP. Explained the Transition to Home Program to patient. Patient is called after discharge by CTN to make sure all needs are met and to see if patient has any questions or problems. Expresses understanding. Patient is aware of when to contact MD with any new or worsening symptoms. Advised to contact PCP 24/7 with any health concerns for early outpatient intervention in an effort to avoid hospitalization. Report any worsening symptoms to PCP and/or Call 911 and/or GO TO  EMERGENCY ROOM if symptoms are severe. Expresses understanding. Will continue to follow. Zo Blount LPN    [phone]  Maxine Hernandez / Lanie Phillips Coordinator    Was this an external facility discharge? No Discharge Facility: [address] to be reviewed by the provider   Additional needs identified to be addressed with provider Yes  home health care-Left shin ulcer bleeding - patient has no dressing to put on it. [id number] [facility] will be out tomorrow. Patient needs appointment with You this week. PATIENT NEEDS 2 DAYS TO SET UP TRANSPORTATION FOR APPOINTMENT. Thank you. I called your office earlier today and Micheline Munoz put in a message to call patient to set up appointment. Zo Blount LPN    [phone]  Maxine Hernandez / Lanie Phillips Coordinator               Method of communication with provider : chart routing      Advance Care Planning:   Does patient have an Advance Directive:  not on file. Was this a readmission?  Yes  Patient stated reason for admission: Diabetic ulcer of left lower leg associated with type 2 diabetes / Right Leg AKA 2019  Patients top risk factors for readmission: functional physical ability  lack of knowledge about disease  medical condition-Diabetic ulcer of left lower leg associated with type 2 diabetes / Right Leg AKA 2019  medication management  support system  transportation  utilization of services    Care Transition Nurse (CTN) contacted the patient by telephone to perform post hospital discharge assessment. Verified name and  with patient as identifiers. Provided introduction to self, and explanation of the CTN role. CTN reviewed discharge instructions, medical action plan and red flags with patient who verbalized understanding. Patient given an opportunity to ask questions and does not have any further questions or concerns at this time. Were discharge instructions available to patient? Yes. Reviewed appropriate site of care based on symptoms and resources available to patient including: PCP, Home health, When to call 911 and [address]. The patient agrees to contact the PCP office for questions related to their healthcare. Medication reconciliation was performed with patient, who verbalizes understanding of administration of home medications. Advised obtaining a 90-day supply of all daily and as-needed medications. Reviewed and educated patient on any new and changed medications related to discharge diagnosis     Was patient discharged with a pulse oximeter? No     Discussed and confirmed pulse oximeter discharge instructions and when to notify provider or seek emergency care. LPN CC provided contact information. Plan for follow-up call in 3-5 days based on severity of symptoms and risk factors.        Non-face-to-face services provided:  Obtained and reviewed discharge summary and/or continuity of care documents    Care Transitions 24 Hour Call    Schedule Follow Up Appointment with PCP: Completed  Do you have any ongoing symptoms?: No  Do you have a copy of your discharge instructions?: Yes  Do you have all of your prescriptions and are they filled?: Yes  Have you been contacted by a [facility] Pharmacist?: No  Have you scheduled your follow up appointment?: Yes  How are you going to get to your appointment?: Other  Were you discharged with any Home Care or Post Acute Services: Yes  Post Acute Services: Home Health (Comment: [facility])  Patient DME: Shower chair, Walker, Wheelchair, Other  Other Patient DME: Entrance Ramp  Do you have support at home?: Other Caregiver  Do you feel like you have everything you need to keep you well at home?: No  Are you an active caregiver in your home?: No  Care Transitions Interventions         Follow Up  Future Appointments   Date Time Provider Ebenezer Carmichael   1/18/2022 11:00 AM José Antonio Morgan MD [facility] [facility]   2/14/2022 12:45 PM Dennie Moorman.,  [facility]   2/21/2022  3:30 PM Andreas Dumont RD, LD [facility] [place]   3/22/2022 11:00 AM Augusta Lopez LPN

## 2022-01-03 NOTE — TELEPHONE ENCOUNTER
I spoke with pt. He says not bleeding bad. Gave options for appt and he wants to come on 1/6/21. He says he is to make an appt with Dr. Ceasar Ibanez if deemed necessary and Dr Perri Herring if necessary. He says looks better than when he went in.

## 2022-01-03 NOTE — CARE COORDINATION
Care Transitions Outreach Attempt    Call within 2 business days of discharge: Yes   Attempted to reach patient for transitions of care follow up. Unable to reach patient. Patient: Felicita Abdullahi Patient : 1970 MRN: [id number]    Last Discharge [facility]       Complaint Diagnosis Description Type Department Provider    21 Leg Edema & painful;(Left)  Leaking Pus per pt  Diabetic ulcer of left shin ([address] . .. ED to Hosp-Admission (Discharged) (ADMITTED) BLANK 5K Nakita Martins PA-C; Мария Macias...        24 Hour Transition of Care Call:  Diabetic ulcer of left lower leg associated with type 2 diabetes       1st Attempt to contact patient for Initial 24 Hour Transition from hospital to home Call:   Patient not reached. Left HIPAA Compliant message on Voice Mail to call CTN (number given) with any questions and an update on patient's condition since discharge. Will continue to follow. Kilo Dash, PRINCESS    [phone]  Silas Schaeffer / Lanie Phillips Coordinator          Was this an external facility discharge?  No Discharge Facility: [facility]    Noted following upcoming appointments from discharge chart review:   [facility] follow up appointment(s):   Future Appointments   Date Time Provider Ebenezer Carmichael   2022 11:00 AM Tania Breen MD [facility] [address]   2022 12:45 PM MD JULIAN Aviles [address]   2022  3:30 PM Abbey Amaya RD, LD [facility] [address]   3/22/2022 11:00 AM Tamiko Garza [address] [place] [address] [facility] [address] [facility] follow up appointment(s):

## 2022-01-04 ENCOUNTER — HOSPITAL ENCOUNTER (INPATIENT)
Age: 52
LOS: 2 days | Discharge: HOME HEALTH | DRG: 638 | End: 2022-01-07
Attending: EMERGENCY MEDICINE | Admitting: HOSPITALIST
Payer: MEDICARE

## 2022-01-04 ENCOUNTER — APPOINTMENT (OUTPATIENT)
Dept: GENERAL RADIOLOGY | Age: 52
DRG: 638 | End: 2022-01-04
Payer: MEDICARE

## 2022-01-04 DIAGNOSIS — L03.116 CELLULITIS AND ABSCESS OF LEFT LOWER EXTREMITY: Primary | ICD-10-CM

## 2022-01-04 DIAGNOSIS — L97.922 NONHEALING ULCER OF LEFT LOWER EXTREMITY WITH FAT LAYER EXPOSED (HCC): ICD-10-CM

## 2022-01-04 DIAGNOSIS — L02.416 CELLULITIS AND ABSCESS OF LEFT LOWER EXTREMITY: Primary | ICD-10-CM

## 2022-01-04 LAB
ALBUMIN SERPL-MCNC: 3.9 G/DL (ref 3.5–5.1)
ALP BLD-CCNC: 66 U/L (ref 38–126)
ALT SERPL-CCNC: 22 U/L (ref 11–66)
ANION GAP SERPL CALCULATED.3IONS-SCNC: 16 MEQ/L (ref 8–16)
AST SERPL-CCNC: 27 U/L (ref 5–40)
BASOPHILS # BLD: 0.8 %
BASOPHILS ABSOLUTE: 0.1 THOU/MM3 (ref 0–0.1)
BILIRUB SERPL-MCNC: 0.3 MG/DL (ref 0.3–1.2)
BILIRUBIN DIRECT: < 0.2 MG/DL (ref 0–0.3)
BLOOD CULTURE, ROUTINE: NORMAL
BLOOD CULTURE, ROUTINE: NORMAL
BUN BLDV-MCNC: 20 MG/DL (ref 7–22)
CALCIUM SERPL-MCNC: 9.6 MG/DL (ref 8.5–10.5)
CHLORIDE BLD-SCNC: 99 MEQ/L (ref 98–111)
CO2: 22 MEQ/L (ref 23–33)
CREAT SERPL-MCNC: 0.9 MG/DL (ref 0.4–1.2)
EOSINOPHIL # BLD: 5.2 %
EOSINOPHILS ABSOLUTE: 0.5 THOU/MM3 (ref 0–0.4)
ERYTHROCYTE [DISTWIDTH] IN BLOOD BY AUTOMATED COUNT: 14.5 % (ref 11.5–14.5)
ERYTHROCYTE [DISTWIDTH] IN BLOOD BY AUTOMATED COUNT: 47.5 FL (ref 35–45)
GFR SERPL CREATININE-BSD FRML MDRD: 88 ML/MIN/1.73M2
GLUCOSE BLD-MCNC: 273 MG/DL (ref 70–108)
HCT VFR BLD CALC: 40.2 % (ref 42–52)
HEMOGLOBIN: 14.2 GM/DL (ref 14–18)
IMMATURE GRANS (ABS): 0.06 THOU/MM3 (ref 0–0.07)
IMMATURE GRANULOCYTES: 0.6 %
LACTIC ACID, SEPSIS: 3.1 MMOL/L (ref 0.5–1.9)
LIPASE: 17.1 U/L (ref 5.6–51.3)
LYMPHOCYTES # BLD: 24.7 %
LYMPHOCYTES ABSOLUTE: 2.5 THOU/MM3 (ref 1–4.8)
MAGNESIUM: 2 MG/DL (ref 1.6–2.4)
MCH RBC QN AUTO: 31.9 PG (ref 26–33)
MCHC RBC AUTO-ENTMCNC: 35.3 GM/DL (ref 32.2–35.5)
MCV RBC AUTO: 90.3 FL (ref 80–94)
MONOCYTES # BLD: 7.8 %
MONOCYTES ABSOLUTE: 0.8 THOU/MM3 (ref 0.4–1.3)
NUCLEATED RED BLOOD CELLS: 0 /100 WBC
OSMOLALITY CALCULATION: 286.1 MOSMOL/KG (ref 275–300)
PLATELET # BLD: 231 THOU/MM3 (ref 130–400)
PMV BLD AUTO: 9.2 FL (ref 9.4–12.4)
POTASSIUM SERPL-SCNC: 4.1 MEQ/L (ref 3.5–5.2)
PROCALCITONIN: 0.07 NG/ML (ref 0.01–0.09)
RBC # BLD: 4.45 MILL/MM3 (ref 4.7–6.1)
SARS-COV-2, NAAT: NOT  DETECTED
SEG NEUTROPHILS: 60.9 %
SEGMENTED NEUTROPHILS ABSOLUTE COUNT: 6.2 THOU/MM3 (ref 1.8–7.7)
SODIUM BLD-SCNC: 137 MEQ/L (ref 135–145)
TOTAL PROTEIN: 7.4 G/DL (ref 6.1–8)
WBC # BLD: 10.2 THOU/MM3 (ref 4.8–10.8)

## 2022-01-04 PROCEDURE — 85025 COMPLETE CBC W/AUTO DIFF WBC: CPT

## 2022-01-04 PROCEDURE — 96365 THER/PROPH/DIAG IV INF INIT: CPT

## 2022-01-04 PROCEDURE — 87075 CULTR BACTERIA EXCEPT BLOOD: CPT

## 2022-01-04 PROCEDURE — 2580000003 HC RX 258: Performed by: EMERGENCY MEDICINE

## 2022-01-04 PROCEDURE — 96366 THER/PROPH/DIAG IV INF ADDON: CPT

## 2022-01-04 PROCEDURE — 83690 ASSAY OF LIPASE: CPT

## 2022-01-04 PROCEDURE — 82248 BILIRUBIN DIRECT: CPT

## 2022-01-04 PROCEDURE — 99285 EMERGENCY DEPT VISIT HI MDM: CPT

## 2022-01-04 PROCEDURE — 83735 ASSAY OF MAGNESIUM: CPT

## 2022-01-04 PROCEDURE — 73590 X-RAY EXAM OF LOWER LEG: CPT

## 2022-01-04 PROCEDURE — 83605 ASSAY OF LACTIC ACID: CPT

## 2022-01-04 PROCEDURE — 87635 SARS-COV-2 COVID-19 AMP PRB: CPT

## 2022-01-04 PROCEDURE — 80053 COMPREHEN METABOLIC PANEL: CPT

## 2022-01-04 PROCEDURE — 87070 CULTURE OTHR SPECIMN AEROBIC: CPT

## 2022-01-04 PROCEDURE — 84145 PROCALCITONIN (PCT): CPT

## 2022-01-04 PROCEDURE — 87040 BLOOD CULTURE FOR BACTERIA: CPT

## 2022-01-04 PROCEDURE — 87205 SMEAR GRAM STAIN: CPT

## 2022-01-04 PROCEDURE — 6360000002 HC RX W HCPCS: Performed by: EMERGENCY MEDICINE

## 2022-01-04 PROCEDURE — 36415 COLL VENOUS BLD VENIPUNCTURE: CPT

## 2022-01-04 RX ADMIN — MEROPENEM 1000 MG: 1 INJECTION, POWDER, FOR SOLUTION INTRAVENOUS at 23:23

## 2022-01-04 ASSESSMENT — ENCOUNTER SYMPTOMS
COUGH: 0
SHORTNESS OF BREATH: 0
ABDOMINAL PAIN: 0
VOICE CHANGE: 0
EYE PAIN: 0
PHOTOPHOBIA: 0
CHEST TIGHTNESS: 0
WHEEZING: 0
RHINORRHEA: 0
CHOKING: 0
EYE ITCHING: 0
BLOOD IN STOOL: 0
TROUBLE SWALLOWING: 0
DIARRHEA: 0
BACK PAIN: 0
ABDOMINAL DISTENTION: 0
SINUS PRESSURE: 0
VOMITING: 0
SORE THROAT: 0
CONSTIPATION: 0
NAUSEA: 0
EYE REDNESS: 0
EYE DISCHARGE: 0

## 2022-01-04 NOTE — TELEPHONE ENCOUNTER
[facility] SYSTEM noted the plans     Electronically signed by Prieto Rebollar MD on 1/4/2022 at 2:23 PM

## 2022-01-04 NOTE — CARE COORDINATION
Late entry, patient discharged to home 1/01/2022 without [place] orders. HH orders placed 1/03/2021. ANYA  RN contacted [facility] and notified them of orders in place. They are to see patient today. 1/4/22, 10:57 AM EST    Patient goals/plan/ treatment preferences discussed by  and . Patient goals/plan/ treatment preferences reviewed with patient/ family. Patient/ family verbalize understanding of discharge plan and are in agreement with goal/plan/treatment preferences. Understanding was demonstrated using the teach back method. AVS provided by RN at time of discharge, which includes all necessary medical information pertaining to the patients current course of illness, treatment, post-discharge goals of care, and treatment preferences.         IMM Letter  IMM Letter given to Patient/Family/Significant other/Guardian/POA/by[de-identified] staff  IMM Letter date given[de-identified] 12/30/21  IMM Letter time given[de-identified] [id number]

## 2022-01-05 PROBLEM — L97.922 NONHEALING ULCER OF LEFT LOWER EXTREMITY WITH FAT LAYER EXPOSED (HCC): Status: ACTIVE | Noted: 2022-01-05

## 2022-01-05 LAB
GLUCOSE BLD-MCNC: 320 MG/DL (ref 70–108)
LACTIC ACID, SEPSIS: 2.5 MMOL/L (ref 0.5–1.9)
LACTIC ACID: 2.3 MMOL/L (ref 0.5–2)

## 2022-01-05 PROCEDURE — 1200000000 HC SEMI PRIVATE

## 2022-01-05 PROCEDURE — 99223 1ST HOSP IP/OBS HIGH 75: CPT | Performed by: HOSPITALIST

## 2022-01-05 PROCEDURE — 6370000000 HC RX 637 (ALT 250 FOR IP): Performed by: PHYSICIAN ASSISTANT

## 2022-01-05 PROCEDURE — 36415 COLL VENOUS BLD VENIPUNCTURE: CPT

## 2022-01-05 PROCEDURE — 6370000000 HC RX 637 (ALT 250 FOR IP): Performed by: HOSPITALIST

## 2022-01-05 PROCEDURE — 2580000003 HC RX 258: Performed by: PHYSICIAN ASSISTANT

## 2022-01-05 PROCEDURE — 83605 ASSAY OF LACTIC ACID: CPT

## 2022-01-05 PROCEDURE — 82948 REAGENT STRIP/BLOOD GLUCOSE: CPT

## 2022-01-05 PROCEDURE — 6360000002 HC RX W HCPCS: Performed by: EMERGENCY MEDICINE

## 2022-01-05 PROCEDURE — 2580000003 HC RX 258: Performed by: HOSPITALIST

## 2022-01-05 PROCEDURE — 2580000003 HC RX 258: Performed by: EMERGENCY MEDICINE

## 2022-01-05 PROCEDURE — 6360000002 HC RX W HCPCS: Performed by: HOSPITALIST

## 2022-01-05 RX ORDER — NITROGLYCERIN 0.4 MG/1
0.4 TABLET SUBLINGUAL EVERY 5 MIN PRN
Refills: 3 | Status: DISCONTINUED | OUTPATIENT
Start: 2022-01-05 | End: 2022-01-07 | Stop reason: HOSPADM

## 2022-01-05 RX ORDER — SODIUM CHLORIDE 0.9 % (FLUSH) 0.9 %
5-40 SYRINGE (ML) INJECTION PRN
Status: DISCONTINUED | OUTPATIENT
Start: 2022-01-05 | End: 2022-01-07 | Stop reason: HOSPADM

## 2022-01-05 RX ORDER — INSULIN GLARGINE 100 [IU]/ML
75 INJECTION, SOLUTION SUBCUTANEOUS 2 TIMES DAILY
Status: DISCONTINUED | OUTPATIENT
Start: 2022-01-05 | End: 2022-01-07 | Stop reason: HOSPADM

## 2022-01-05 RX ORDER — BLOOD-GLUCOSE SENSOR
1 EACH MISCELLANEOUS
Status: DISCONTINUED | OUTPATIENT
Start: 2022-01-05 | End: 2022-01-05 | Stop reason: RX

## 2022-01-05 RX ORDER — MULTIVITAMIN WITH IRON
1 TABLET ORAL DAILY
Status: DISCONTINUED | OUTPATIENT
Start: 2022-01-05 | End: 2022-01-07 | Stop reason: HOSPADM

## 2022-01-05 RX ORDER — POLYETHYLENE GLYCOL 3350 17 G/17G
17 POWDER, FOR SOLUTION ORAL DAILY PRN
Status: DISCONTINUED | OUTPATIENT
Start: 2022-01-05 | End: 2022-01-07 | Stop reason: HOSPADM

## 2022-01-05 RX ORDER — FENOFIBRATE 160 MG/1
160 TABLET ORAL DAILY
Status: DISCONTINUED | OUTPATIENT
Start: 2022-01-05 | End: 2022-01-07 | Stop reason: HOSPADM

## 2022-01-05 RX ORDER — ACETAMINOPHEN 650 MG/1
650 SUPPOSITORY RECTAL EVERY 6 HOURS PRN
Status: DISCONTINUED | OUTPATIENT
Start: 2022-01-05 | End: 2022-01-07 | Stop reason: HOSPADM

## 2022-01-05 RX ORDER — LISINOPRIL 5 MG/1
5 TABLET ORAL DAILY
Status: DISCONTINUED | OUTPATIENT
Start: 2022-01-05 | End: 2022-01-07 | Stop reason: HOSPADM

## 2022-01-05 RX ORDER — ONDANSETRON 2 MG/ML
4 INJECTION INTRAMUSCULAR; INTRAVENOUS EVERY 6 HOURS PRN
Status: DISCONTINUED | OUTPATIENT
Start: 2022-01-05 | End: 2022-01-07 | Stop reason: HOSPADM

## 2022-01-05 RX ORDER — ONDANSETRON 4 MG/1
4 TABLET, ORALLY DISINTEGRATING ORAL EVERY 8 HOURS PRN
Status: DISCONTINUED | OUTPATIENT
Start: 2022-01-05 | End: 2022-01-07 | Stop reason: HOSPADM

## 2022-01-05 RX ORDER — SODIUM CHLORIDE 9 MG/ML
25 INJECTION, SOLUTION INTRAVENOUS PRN
Status: DISCONTINUED | OUTPATIENT
Start: 2022-01-05 | End: 2022-01-07 | Stop reason: HOSPADM

## 2022-01-05 RX ORDER — SODIUM CHLORIDE 9 MG/ML
INJECTION, SOLUTION INTRAVENOUS CONTINUOUS
Status: DISCONTINUED | OUTPATIENT
Start: 2022-01-05 | End: 2022-01-07 | Stop reason: HOSPADM

## 2022-01-05 RX ORDER — ATORVASTATIN CALCIUM 40 MG/1
40 TABLET, FILM COATED ORAL NIGHTLY
Status: DISCONTINUED | OUTPATIENT
Start: 2022-01-05 | End: 2022-01-07 | Stop reason: HOSPADM

## 2022-01-05 RX ORDER — SODIUM CHLORIDE 0.9 % (FLUSH) 0.9 %
5-40 SYRINGE (ML) INJECTION EVERY 12 HOURS SCHEDULED
Status: DISCONTINUED | OUTPATIENT
Start: 2022-01-05 | End: 2022-01-07 | Stop reason: HOSPADM

## 2022-01-05 RX ORDER — ACETAMINOPHEN 325 MG/1
650 TABLET ORAL EVERY 6 HOURS PRN
Status: DISCONTINUED | OUTPATIENT
Start: 2022-01-05 | End: 2022-01-07 | Stop reason: HOSPADM

## 2022-01-05 RX ORDER — LEVOTHYROXINE SODIUM 0.05 MG/1
50 TABLET ORAL DAILY
Status: DISCONTINUED | OUTPATIENT
Start: 2022-01-05 | End: 2022-01-07 | Stop reason: HOSPADM

## 2022-01-05 RX ORDER — BLOOD-GLUCOSE,RECEIVER,CONT
1 EACH MISCELLANEOUS
Status: DISCONTINUED | OUTPATIENT
Start: 2022-01-05 | End: 2022-01-05 | Stop reason: RX

## 2022-01-05 RX ORDER — GABAPENTIN 300 MG/1
300 CAPSULE ORAL 3 TIMES DAILY
Status: DISCONTINUED | OUTPATIENT
Start: 2022-01-05 | End: 2022-01-07 | Stop reason: HOSPADM

## 2022-01-05 RX ORDER — BLOOD-GLUCOSE TRANSMITTER
1 EACH MISCELLANEOUS
Status: DISCONTINUED | OUTPATIENT
Start: 2022-01-05 | End: 2022-01-05 | Stop reason: RX

## 2022-01-05 RX ORDER — ARIPIPRAZOLE 5 MG/1
5 TABLET ORAL DAILY
Status: DISCONTINUED | OUTPATIENT
Start: 2022-01-05 | End: 2022-01-07 | Stop reason: HOSPADM

## 2022-01-05 RX ORDER — PANTOPRAZOLE SODIUM 40 MG/1
40 TABLET, DELAYED RELEASE ORAL
Status: DISCONTINUED | OUTPATIENT
Start: 2022-01-05 | End: 2022-01-07 | Stop reason: HOSPADM

## 2022-01-05 RX ORDER — INSULIN GLARGINE 100 [IU]/ML
50 INJECTION, SOLUTION SUBCUTANEOUS 2 TIMES DAILY
Status: DISCONTINUED | OUTPATIENT
Start: 2022-01-05 | End: 2022-01-05

## 2022-01-05 RX ORDER — METOPROLOL TARTRATE 50 MG/1
50 TABLET, FILM COATED ORAL 2 TIMES DAILY
Status: DISCONTINUED | OUTPATIENT
Start: 2022-01-05 | End: 2022-01-07 | Stop reason: HOSPADM

## 2022-01-05 RX ADMIN — GABAPENTIN 300 MG: 300 CAPSULE ORAL at 09:30

## 2022-01-05 RX ADMIN — INSULIN LISPRO 8 UNITS: 100 INJECTION, SOLUTION INTRAVENOUS; SUBCUTANEOUS at 16:46

## 2022-01-05 RX ADMIN — ATORVASTATIN CALCIUM 40 MG: 40 TABLET, FILM COATED ORAL at 21:40

## 2022-01-05 RX ADMIN — LISINOPRIL 5 MG: 5 TABLET ORAL at 09:29

## 2022-01-05 RX ADMIN — LEVOTHYROXINE SODIUM 50 MCG: 0.05 TABLET ORAL at 09:29

## 2022-01-05 RX ADMIN — MEROPENEM 1000 MG: 1 INJECTION, POWDER, FOR SOLUTION INTRAVENOUS at 11:45

## 2022-01-05 RX ADMIN — METOPROLOL TARTRATE 50 MG: 50 TABLET ORAL at 09:30

## 2022-01-05 RX ADMIN — FENOFIBRATE 160 MG: 160 TABLET ORAL at 09:30

## 2022-01-05 RX ADMIN — Medication 1 TABLET: at 11:42

## 2022-01-05 RX ADMIN — INSULIN GLARGINE 75 UNITS: 100 INJECTION, SOLUTION SUBCUTANEOUS at 21:50

## 2022-01-05 RX ADMIN — ONDANSETRON 4 MG: 2 INJECTION INTRAMUSCULAR; INTRAVENOUS at 09:29

## 2022-01-05 RX ADMIN — METOPROLOL TARTRATE 50 MG: 50 TABLET ORAL at 21:40

## 2022-01-05 RX ADMIN — GABAPENTIN 300 MG: 300 CAPSULE ORAL at 13:27

## 2022-01-05 RX ADMIN — GABAPENTIN 300 MG: 300 CAPSULE ORAL at 21:40

## 2022-01-05 RX ADMIN — SODIUM CHLORIDE, PRESERVATIVE FREE 10 ML: 5 INJECTION INTRAVENOUS at 09:29

## 2022-01-05 RX ADMIN — ARIPIPRAZOLE 5 MG: 5 TABLET ORAL at 09:30

## 2022-01-05 RX ADMIN — INSULIN GLARGINE 75 UNITS: 100 INJECTION, SOLUTION SUBCUTANEOUS at 09:28

## 2022-01-05 RX ADMIN — MEROPENEM 1000 MG: 1 INJECTION, POWDER, FOR SOLUTION INTRAVENOUS at 21:41

## 2022-01-05 RX ADMIN — ENOXAPARIN SODIUM 40 MG: 100 INJECTION SUBCUTANEOUS at 09:28

## 2022-01-05 RX ADMIN — PANTOPRAZOLE SODIUM 40 MG: 40 TABLET, DELAYED RELEASE ORAL at 13:27

## 2022-01-05 RX ADMIN — SODIUM CHLORIDE: 9 INJECTION, SOLUTION INTRAVENOUS at 17:00

## 2022-01-05 ASSESSMENT — ENCOUNTER SYMPTOMS
EYES NEGATIVE: 1
RESPIRATORY NEGATIVE: 1
GASTROINTESTINAL NEGATIVE: 1

## 2022-01-05 ASSESSMENT — PAIN SCALES - GENERAL: PAINLEVEL_OUTOF10: 10

## 2022-01-05 NOTE — ED NOTES
Assumed pt care at this time. Report received from [place]. Pt resting in bed. Resp regular. Call light in reach.       Rizwana Mortensen RN  01/05/22 2985

## 2022-01-05 NOTE — H&P
History & Physical        Patient:  Dioni Hylton  YOB: 1970    MRN: [id number]     Acct: [de-identified]    PCP: Bria Ewing MD    Date of Admission: 1/4/2022    Date of Service: Pt seen/examined on 01/05/22  and Admitted to inpatient with expected LOS greater than two midnights due to medical therapy. Chief Complaint: Persistent nonhealing infected diabetic ulcer, failure of outpatient treatment    History Of Present Illness:  Dioni Hylton is a 46 y.o. male with PMHx of insulin-dependent diabetes mellitus type 2 with prior nonhealing infections leading to amputations, recently admitted here from 12/29/2021 until 1/1/2022 for management of left lower extremity infected ulcer, who presented to [facility] again with failure of outpatient management. During patient's last admission, he was seen both by Dr. Laura Bethea and Dr. Rhonda Kumar. At that time, there were no areas of necrosis or abscess to drain, and patient was initially treated with Zyvox. This was since changed to ceftriaxone. As there was no recommendations for surgical intervention at that time, decision was made to discharge home with a trial of outpatient antibiotics. He was discharged on Keflex. Patient reports that he continue to experience subjective fever and chills, although he did not present with fever here. He was concerned that his wound was not improving, possibly worsening, therefore came back to the ED as noted. In the ED, patient had mild tachycardia, but was not febrile, and did not have leukocytosis. He therefore did not meet sepsis criteria. However, initial lactic acid level was 3.1, and subsequent lactic acid levels improved to 2.5. X-ray of the left lower extremity demonstrated lucency at the lateral malleolus, no dislocation identified. Patient was empirically started on IV antibiotics. We were asked to admit him for further management.       Past Medical History: Diagnosis Date    Bipolar 2 disorder [facility])     previously followed with Dr. Aris Davis and Kong Green in [facility] BPH (benign prostatic hyperplasia)     Diabetes mellitus type 2, uncontrolled ([address]     HgbA1c on 4/2/2019 was 9.1.     Diabetic peripheral neuropathy (HCC)     Diabetic polyneuropathy ([address]     Diabetic ulcer of right foot associated with type 2 diabetes mellitus ([address] 12/10/2015    Essential hypertension     \"never been on b/p medication that I know of\"    GERD (gastroesophageal reflux disease)     Hammer toe of left foot     Heart murmur     denies any chest pain or palpitations    History of tobacco abuse     Hx of AKA (above knee amputation), right ([address] 04/18/2019    Dr. Marito Bradshaw edema, diabetic, bilateral ([address] 05/04/2018    Dr. Trey Lizama referred to retina specialist for 2nd opinion    Marijuana abuse in remission     Melena     MRSA (methicillin resistant staph aureus) culture positive     Onychomycosis     Other disorders of kidney and ureter in diseases classified elsewhere     Sleep apnea     no cpap    Thyroid disease     WD-Skin ulcer of fourth toe of right foot with necrosis of bone ([address] 6/29/2016       Past Surgical History:          Procedure Laterality Date    ABSCESS DRAINAGE Right     foot    AMPUTATION ABOVE KNEE Right 4/18/2019    RIGHT ABOVE KNEE AMPUTATION performed by Colette Buchanan MD at [address] [place] [address], LAPAROSCOPIC N/A 4/1/2020    ROBOTIC CHOLECYSTECTOMY performed by Alison Grant MD at [address] [facility] Prachi Sepulveda 7/20/2020    CYSTOSCOPY performed by Mariano Crum MD at [address] [facility] [address]ul[address]a 8/21/2020    CYSTOSCOPY, UROLIFT performed by Mariano Crum MD at [address] [place] [address] Rd, COLON, DIAGNOSTIC     [facility] Right 07/01/2016    I & D    GASTROCNEMIUS RECESSION Left 11/12/2021    LEFT LEG GASTROCS RECESSION performed by Lisset Saab MD at [address] [facility] DRAINAGE Right 2/18/2019    I&D RIGHT STUMP performed by Mellisa Silverio MD at [facility] Right 07/20/2016    LEG AMPUTATION BELOW KNEE Right 4/4/2019    I&D AND REVISION OF AMPUTATION RIGHT LEG performed by Mellisa Silverio MD at [facility] Right 1/14/15    sole of foot I&D    CT DRAIN INFECT SHOULDER BURSA Left 8/18/2017    LEFT SHOULDER INCISION AND DRAINAGE performed by Mellisa Silverio MD at [address] OFFICE/OUTPT [facility] Right 9/20/2018    EXCISIONAL DEBRIDEMENT RIGHT BKA STUMP performed by Conrado Basilio MD at [facility] Right 1/16/15    2nd toe with wound vac applied    UPPER GASTROINTESTINAL ENDOSCOPY N/A 3/3/2020    EGD DILATION SAVORY performed by Michelle Tran MD at [facility]  ? when       Medications Prior to Admission:      Prior to Admission medications    Medication Sig Start Date End Date Taking? Authorizing Provider   cephALEXin (KEFLEX) 500 MG capsule Take 1 capsule by mouth 4 times daily for 7 days 1/1/22 1/8/22  Basilia White PA-C   Insulin Disposable Pump (OMNIPOD DASH 5 PACK PODS) MISC CHANGE PODS EVERY 48 HOURS AS DIRECTED 11/29/21   Phuc Ortega MD   Insulin Pen Needle (PEN NEEDLES) 31G X 8 MM MISC For use with insulin injections five timed per day dx:E11.9 11/9/21   Tonny Curry MD   gabapentin (NEURONTIN) 300 MG capsule TAKE 1 CAPSULE BY MOUTH THREE TIMES DAILY. 11/3/21 2/2/22  Phuc Ortega MD   insulin lispro (HUMALOG KWIKPEN U-200) 200 UNIT/ML SOPN pen 40 units Plus scale - Max dose 160 units per day 11/3/21   Tonny Curry MD   nitroGLYCERIN (NITROSTAT) 0.3 MG SL tablet Place 1 tablet under the tongue every 5 minutes as needed for Chest pain up to max of 3 total doses.  If no relief after 1 dose, call 911. 8/16/21   Tonny Curry MD   empagliflozin (JARDIANCE) 25 MG tablet Take 1 tablet by mouth daily New dose 8/6/21   Tonny Curry MD Calcium-Phosphorus-Vitamin D (CALCIUM/D3 ADULT GUMMIES PO) Take 2 tablets by mouth daily    Historical Provider, MD   sertraline (ZOLOFT) 50 MG tablet Take 50 mg by mouth daily    Historical Provider, MD   Multiple Vitamins-Minerals (THERAPEUTIC MULTIVITAMIN-MINERALS) tablet Take 1 tablet by mouth daily    Historical Provider, MD   atorvastatin (LIPITOR) 40 MG tablet Take 1 tablet by mouth nightly 7/28/21   Gaby Frazier MD   fenofibrate (TRICOR) 145 MG tablet Take 1 tablet by mouth daily 7/28/21   Gaby Frazier MD   levothyroxine (SYNTHROID) 50 MCG tablet Take 1 tablet by mouth daily 7/28/21   Gaby Frazier MD   metoprolol tartrate (LOPRESSOR) 50 MG tablet Take 1 tablet by mouth 2 times daily 7/28/21   Gaby Frazier MD   Insulin Glargine, 2 Unit Dial, 300 UNIT/ML SOPN Change to 75 units BID and every 10 days increase by 5 units both times, to get am sugars 150-200  Patient taking differently: Change to 80 units BID and every 10 days increase by 5 units both times, to get am sugars 150-200 6/29/21   Phuc Ortega MD   Continuous Blood Gluc Transmit (DEXCOM G6 TRANSMITTER) MISC 1 Device by Does not apply route every 3 months 3/22/21   Jenean Day, APRN - CNP   Continuous Blood Gluc Sensor (DEXCOM G6 SENSOR) MISC 1 Device by Does not apply route every 14 days 3/22/21   Jenean Day, APRN - CNP   Continuous Blood Gluc  (DEXCOM G6 ) LIANNE 1 Device by Does not apply route 4 times daily (before meals and nightly) 3/22/21   Jenean Day, APRN - CNP   lisinopril (PRINIVIL;ZESTRIL) 5 MG tablet Take 1 tablet by mouth daily 3/17/21   Jenean Day, APRN - CNP   ARIPiprazole (ABILIFY) 5 MG tablet Take 5 mg by mouth daily  12/16/20   Historical Provider, MD       Allergies:  Pcn [penicillins], Actos [pioglitazone], Clindamycin/lincomycin, Vancomycin, and Metformin and related    Social History:      The patient currently lives with several friends.     TOBACCO:   reports that he has been smoking cigars. He started smoking about 36 years ago. He has been smoking about 0.00 packs per day for the past 10.00 years. He has never used smokeless tobacco.  ETOH:   reports previous alcohol use. DRUG USE HISTORY: Denies. EMPLOYMENT HISTORY: Patient previously worked as a . He has been on disability since . Family History: Mother: Mother  from pneumonia/H1 N1. She had type 1 diabetes. Father: Patient never knew his biological father. Siblings: Patient was an only child. Children: Patient has 2 children otherwise healthy. REVIEW OF SYSTEMS:   12 points of systems reviewed and otherwise negative except for that which already was noted above. PHYSICAL EXAM:    BP (!) 148/62   Pulse 97   Temp 97.5 °F (36.4 °C) (Oral)   Resp 16   Wt 223 lb (101.2 kg)   SpO2 97%   BMI 35.99 kg/m²     General appearance: Alert and oriented x3, pleasant, comfortable, no apparent distress, well developed, appears stated age. Eyes:  Pupils equal, round, and reactive to light. Conjunctivae/corneas clear. HENT: Head normal in appearance. External nares normal.  Oral mucosa moist without lesions. Hearing grossly intact. Neck: Supple, with full range of motion. Trachea midline. No gross JVD appreciated. Respiratory:  Normal respiratory effort. No accessory muscle usage clear to auscultation, bilaterally without rales or wheezes or rhonchi. Cardiovascular: Normal rate, regular rhythm with normal S1/S2 without murmurs. Abdomen: Soft, obese, non-tender, non-distended with normal bowel sounds. Musculoskeletal: Status post right AKA. Status post left fifth toe amputation. Skin: Warm and dry. Right stump wound appears dry, well-healed. Left tibial surface ulceration with exposed fatty tissue, irregular border, proteinaceous discharge noted. Neurologic:  No focal sensory/motor deficits in the upper and lower extremities. Cranial nerves:  grossly non-focal 2-12.   Psychiatric: Normal mood, normal affect, normal insight and though content. Labs:     Recent Labs     01/04/22 2242   WBC 10.2   HGB 14.2   HCT 40.2*        Recent Labs     01/04/22 2242      K 4.1   CL 99   CO2 22*   BUN 20   CREATININE 0.9   CALCIUM 9.6     Recent Labs     01/04/22 2242   AST 27   ALT 22   BILIDIR <0.2   BILITOT 0.3   ALKPHOS 66     No results for input(s): INR in the last 72 hours. No results for input(s): Skippy Gault in the last 72 hours. Urinalysis:      Lab Results   Component Value Date    NITRU NEGATIVE 12/30/2021    WBCUA 0-2 12/30/2021    BACTERIA NONE SEEN 12/30/2021    RBCUA 0-2 12/30/2021    BLOODU NEGATIVE 12/30/2021    SPECGRAV 1.025 07/27/2021    GLUCOSEU >= 1000 12/30/2021       Intake & Output:  No intake/output data recorded. No intake/output data recorded. XR TIBIA FIBULA LEFT (2 VIEWS)   Final Result      Poorly visualized lucency at the lateral malleolus suspicious for nondisplaced fracture. Final report electronically signed by Dr. Tahira Cleary on 1/4/2022 11:06 PM      VL DUP LOWER EXTREMITY VENOUS LEFT    (Results Pending)            Assessment And Plan:    1. Nonhealing infected diabetic ulcer, failure of outpatient treatment: As noted above, recent admission for infected ulcer. Patient was treated with IV Rocephin while hospitalized, and was discharged on Keflex. It appears that this patient's wound was not responding to above antibiotics. We may query possible peripheral vascular disease given prior nonhealing infections leading to amputations. Patient will be admitted again due to failure of outpatient management, and we will again treat with IV antibiotics. Patient states that he is allergic to vancomycin. He was previously treated with IV Zyvox and switch to ceftriaxone. We will again cover with IV ceftriaxone for now, and will consult infectious disease for further recommendations.   Dr. Ashanti Cruz and Dr. Phyllis Max are both familiar with this patient, and we will again consult their service to evaluate for need for additional surgical debridement. 2.  Insulin-dependent diabetes mellitus type 2: We will hold patient's home dosage of Jardiance, we will continue with Lantus and insulin sliding scale. Patient's last A1c from 1/1/2022 was noted to be 7.1%. 3.  Hypertension: We will continue with patient's home medications of metoprolol and lisinopril. 4.  Depression: Patient normally is on sertraline. This was held during his last hospitalization due to treatment with Zyvox as a precaution for potential interaction and serotonin syndrome. We will again hold this agent in case patient may need to go back on Zyvox. For now we will treat with ceftriaxone as noted above. 5.  Hypothyroidism: We will continue with patient's levothyroxine. 6.  Calf tenderness: Patient had recent Achilles tendon release in November. He had a venous ultrasound on 12/3/2021, and again on 12/29/2021, both times were negative for DVT. 7.  DVT prophylaxis: Lovenox daily injection. Code Status: Full Code    Thank you [place] MD Claude for the opportunity to be involved in this patient's care.     Electronically signed by Anastasia Salazar MD on 1/5/2022 at 4:06 AM

## 2022-01-05 NOTE — ED TRIAGE NOTES
Pt presents to the ED with complaints of left leg infection. Pt was admitted to the hospital from 12/29-1/1 with diabetic ulcer to left shin and cellulitis. Pt was receiving IV antibiotics while in the hospital, then discharged home on oral antibiotics. Pt states tonight his shin began to draining exudate. Pt states that he does not want to loose his only leg.

## 2022-01-05 NOTE — ED PROVIDER NOTES
[facility]  eMERGENCY dEPARTMENT eNCOUnter          CHIEF COMPLAINT       Chief Complaint   Patient presents with    Leg Swelling       Nurses Notes reviewed and I agree except as noted in the HPI. HISTORY OF PRESENT ILLNESS    Jake Jaramillo is a 46 y.o. male who presents wound on left lower leg. Apparently the patient was just admitted to the hospital and discharged. He has seen  [facility] and Dr. Jennifer Mock. They were talking about doing wound debridement. Patient has not followed up with wound clinic. He is on Keflex at home. He states the wound is getting worse. 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 Patient is a diabetic states that his blood sugars have been controlled. Patient is in a wheelchair chronically. Patient is not complaining of any pain. Patient is otherwise resting comfortably on the cot no apparent distress no other physical complaints at this time. Patient was instructed by Dr. Kayode Quinones with infectious disease to come back and if it would worsen in any way. REVIEW OF SYSTEMS     Review of Systems   Constitutional: Negative for activity change, appetite change, diaphoresis, fatigue and unexpected weight change. HENT: Negative for congestion, ear discharge, ear pain, hearing loss, rhinorrhea, sinus pressure, sore throat, trouble swallowing and voice change. Eyes: Negative for photophobia, pain, discharge, redness and itching. Respiratory: Negative for cough, choking, chest tightness, shortness of breath and wheezing. Cardiovascular: Negative for chest pain, palpitations and leg swelling. Gastrointestinal: Negative for abdominal distention, abdominal pain, blood in stool, constipation, diarrhea, nausea and vomiting. Endocrine: Negative for polydipsia, polyphagia and polyuria.    Genitourinary: Negative for decreased urine volume, difficulty urinating, dysuria, enuresis, frequency, hematuria and urgency. Musculoskeletal: Negative for arthralgias, back pain, gait problem, myalgias, neck pain and neck stiffness. Skin: Positive for wound. Negative for pallor and rash. Allergic/Immunologic: Negative for immunocompromised state. Neurological: Negative for dizziness, tremors, seizures, syncope, facial asymmetry, weakness, light-headedness, numbness and headaches. Hematological: Negative for adenopathy. Does not bruise/bleed easily. Psychiatric/Behavioral: Negative for agitation, hallucinations and suicidal ideas. The patient is not nervous/anxious. PAST MEDICAL HISTORY    has a past medical history of Bipolar 2 disorder ([address] BPH (benign prostatic hyperplasia), COPD (chronic obstructive pulmonary disease) ([address] Diabetes mellitus type 2, uncontrolled ([address] Diabetic peripheral neuropathy ([address] Diabetic polyneuropathy ([address] Diabetic ulcer of right foot associated with type 2 diabetes mellitus ([address] Essential hypertension, GERD (gastroesophageal reflux disease), Hammer toe of left foot, Heart murmur, History of tobacco abuse, Hx of AKA (above knee amputation), right ([address] Hyperlipidemia, Macular edema, diabetic, bilateral ([address] Marijuana abuse in remission, Melena, MRSA (methicillin resistant staph aureus) culture positive, Onychomycosis, Other disorders of kidney and ureter in diseases classified elsewhere, Sleep apnea, Thyroid disease, and 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 SURGICAL HISTORY      has a past surgical history that includes other surgical history (Right, 1/14/15); Abscess Drainage (Right); Toe amputation (Right, 1/16/15); Foot Debridement (Right, 07/01/2016); Leg amputation below knee (Right, 07/20/2016); [place] tooth extraction (?when); pr drain infect shoulder bursa (Left, 8/18/2017); pr office/outpt visit,procedure only (Right, 9/20/2018); incision and drainage (Right, 2/18/2019);  Leg amputation below knee (Right, 4/4/2019); AMPUTATION ABOVE KNEE (Right, 4/18/2019); Upper gastrointestinal endoscopy (N/A, 3/3/2020); Cholecystectomy, laparoscopic (N/A, 4/1/2020); Endoscopy, colon, diagnostic; Cystoscopy (N/A, 7/20/2020); Cystoscopy (N/A, 8/21/2020); Gastrocnemius Recession (Left, 11/12/2021); Abdomen surgery; Colonoscopy; and Dilatation, esophagus. CURRENT MEDICATIONS       Current Discharge Medication List      CONTINUE these medications which have NOT CHANGED    Details   gabapentin (NEURONTIN) 300 MG capsule TAKE 1 CAPSULE BY MOUTH THREE TIMES DAILY. Qty: 90 capsule, Refills: 2    Associated Diagnoses: Uncontrolled type 2 diabetes mellitus with hyperglycemia, with long-term current use of insulin ([address] Neuropathy      empagliflozin (JARDIANCE) 25 MG tablet Take 1 tablet by mouth daily New dose  Qty: 30 tablet, Refills: 5      Calcium-Phosphorus-Vitamin D (CALCIUM/D3 ADULT GUMMIES PO) Take 2 tablets by mouth daily      sertraline (ZOLOFT) 50 MG tablet Take 50 mg by mouth daily      Multiple Vitamins-Minerals (THERAPEUTIC MULTIVITAMIN-MINERALS) tablet Take 1 tablet by mouth daily      atorvastatin (LIPITOR) 40 MG tablet Take 1 tablet by mouth nightly  Qty: 30 tablet, Refills: 3      levothyroxine (SYNTHROID) 50 MCG tablet Take 1 tablet by mouth daily  Qty: 90 tablet, Refills: 1    Associated Diagnoses: Hypothyroidism, unspecified type      metoprolol tartrate (LOPRESSOR) 50 MG tablet Take 1 tablet by mouth 2 times daily  Qty: 90 tablet, Refills: 3      lisinopril (PRINIVIL;ZESTRIL) 5 MG tablet Take 1 tablet by mouth daily  Qty: 90 tablet, Refills: 1    Associated Diagnoses: Uncontrolled type 2 diabetes mellitus with hyperglycemia, with long-term current use of insulin ([address]  Essential hypertension      ARIPiprazole (ABILIFY) 5 MG tablet Take 5 mg by mouth daily       cephALEXin (KEFLEX) 500 MG capsule Take 1 capsule by mouth 4 times daily for 7 days  Qty: 28 capsule, Refills: 0      Insulin Disposable Pump (OMNIPOD DASH 5 PACK PODS) MISC CHANGE PODS EVERY 48 HOURS AS DIRECTED  Qty: 45 each, Refills: 3      Insulin Pen Needle (PEN NEEDLES) 31G X 8 MM MISC For use with insulin injections five timed per day dx:E11.9  Qty: 150 each, Refills: 5      insulin lispro (HUMALOG KWIKPEN U-200) 200 UNIT/ML SOPN pen 40 units Plus scale - Max dose 160 units per day  Qty: 30 pen, Refills: 2    Associated Diagnoses: Uncontrolled type 2 diabetes mellitus with hyperglycemia, with long-term current use of insulin ([facility])      nitroGLYCERIN (NITROSTAT) 0.3 MG SL tablet Place 1 tablet under the tongue every 5 minutes as needed for Chest pain up to max of 3 total doses. If no relief after 1 dose, call 911.   Qty: 30 tablet, Refills: 3      fenofibrate (TRICOR) 145 MG tablet Take 1 tablet by mouth daily  Qty: 30 tablet, Refills: 3      Insulin Glargine, 2 Unit Dial, 300 UNIT/ML SOPN Change to 75 units BID and every 10 days increase by 5 units both times, to get am sugars 150-200  Qty: 15 pen, Refills: 2    Associated Diagnoses: Uncontrolled type 2 diabetes mellitus with hyperglycemia, with long-term current use of insulin ([facility])      Continuous Blood Gluc Transmit (DEXCOM G6 TRANSMITTER) MISC 1 Device by Does not apply route every 3 months  Qty: 1 each, Refills: 3    Associated Diagnoses: Uncontrolled type 2 diabetes mellitus with hyperglycemia, with long-term current use of insulin ([facility])      Continuous Blood Gluc Sensor (DEXCOM G6 SENSOR) MISC 1 Device by Does not apply route every 14 days  Qty: 9 each, Refills: 3    Associated Diagnoses: Uncontrolled type 2 diabetes mellitus with hyperglycemia, with long-term current use of insulin ([facility])      Continuous Blood Gluc  (DEXCOM G6 ) LIANNE 1 Device by Does not apply route 4 times daily (before meals and nightly)  Qty: 1 Device, Refills: 0    Associated Diagnoses: Uncontrolled type 2 diabetes mellitus with hyperglycemia, with long-term current use of insulin ([facility])             ALLERGIES     is allergic to pcn [penicillins], actos [pioglitazone], clindamycin/lincomycin, vancomycin, and metformin and related. FAMILY HISTORY     He indicated that his mother is . He indicated that the status of his father is unknown and reported the following: unknown. He indicated that the status of his maternal grandmother is unknown. He indicated that the status of his maternal grandfather is unknown.   family history includes Arthritis in his maternal grandfather; Depression in his mother; Diabetes in his mother; Early Death in his mother; Heart Disease in his maternal grandfather; High Blood Pressure in his mother; High Cholesterol in his mother; Other in his mother; Vision Loss in his maternal grandmother. SOCIAL HISTORY      reports that he has been smoking cigars. He started smoking about 36 years ago. He has been smoking about 0.00 packs per day for the past 10.00 years. He has never used smokeless tobacco. He reports previous alcohol use. He reports that he does not use drugs. PHYSICAL EXAM     INITIAL VITALS:  weight is 223 lb (101.2 kg). His oral temperature is 97.5 °F (36.4 °C). His blood pressure is 120/60 and his pulse is 77. His respiration is 18 and oxygen saturation is 99%. Physical Exam  Vitals and nursing note reviewed. Constitutional:       General: He is not in acute distress. Appearance: He is well-developed. He is not diaphoretic. HENT:      Head: Normocephalic and atraumatic. Right Ear: External ear normal.      Left Ear: External ear normal.      Nose: Nose normal.   Eyes:      General: Lids are normal. No scleral icterus. Right eye: No discharge. Left eye: No discharge. Conjunctiva/sclera: Conjunctivae normal.      Right eye: No exudate. Left eye: No exudate. Pupils: Pupils are equal, round, and reactive to light. Neck:      Thyroid: No thyromegaly. Vascular: No JVD. Trachea: No tracheal deviation.    Cardiovascular:      Rate and Rhythm: Normal rate and regular rhythm. Pulses: Normal pulses. Heart sounds: Normal heart sounds, S1 normal and S2 normal. No murmur heard. No friction rub. No gallop. Pulmonary:      Effort: Pulmonary effort is normal. No respiratory distress. Breath sounds: Normal breath sounds. No stridor. No wheezing or rales. Chest:      Chest wall: No tenderness. Abdominal:      General: Bowel sounds are normal. There is no distension. Palpations: Abdomen is soft. There is no mass. Tenderness: There is no abdominal tenderness. There is no guarding or rebound. Musculoskeletal:         General: No tenderness. Normal range of motion. Cervical back: Normal range of motion and neck supple. Normal range of motion. Lymphadenopathy:      Cervical: No cervical adenopathy. Skin:     General: Skin is warm and dry. Findings: Lesion, rash and wound present. No bruising, ecchymosis, laceration or petechiae. Comments: Large wound with cellulitis not evidence of necrosis, weeping left shin. He has a right BKA. Patient is distally neurovascularly intact on the left pulses are +1 capillary refill is 3 to 4 seconds. Neurological:      Mental Status: He is alert and oriented to person, place, and time. Cranial Nerves: No cranial nerve deficit. Sensory: No sensory deficit. Coordination: Coordination normal.      Deep Tendon Reflexes: Reflexes are normal and symmetric. Psychiatric:         Speech: Speech normal.         Behavior: Behavior normal.         Thought Content:  Thought content normal.         Judgment: Judgment normal.           DIFFERENTIAL DIAGNOSIS:   Cellulitis, necrotic skin ulcer, osteomyelitis, MRSA    DIAGNOSTIC RESULTS     EKG: All EKG's are interpreted by the Emergency Department Physician who either signs or Co-signs this chart in the absence of a cardiologist.  None    RADIOLOGY: non-plain film images(s) such as CT, Ultrasound and MRI are read by the radiologist.  XR TIBIA FIBULA LEFT (2 VIEWS)   Final Result      Poorly visualized lucency at the lateral malleolus suspicious for nondisplaced fracture.       Final report electronically signed by Dr. Gabe Gustafson on 1/4/2022 11:06 PM            LABS:   Labs Reviewed   CBC WITH AUTO DIFFERENTIAL - Abnormal; Notable for the following components:       Result Value    RBC 4.45 (*)     Hematocrit 40.2 (*)     RDW-SD 47.5 (*)     MPV 9.2 (*)     Eosinophils Absolute 0.5 (*)     All other components within normal limits   BASIC METABOLIC PANEL - Abnormal; Notable for the following components:    CO2 22 (*)     Glucose 273 (*)     All other components within normal limits   LACTATE, SEPSIS - Abnormal; Notable for the following components:    Lactic Acid, Sepsis 3.1 (*)     All other components within normal limits   LACTATE, SEPSIS - Abnormal; Notable for the following components:    Lactic Acid, Sepsis 2.5 (*)     All other components within normal limits   GLOMERULAR FILTRATION RATE, ESTIMATED - Abnormal; Notable for the following components:    Est, Glom Filt Rate 88 (*)     All other components within normal limits   LACTIC ACID, PLASMA - Abnormal; Notable for the following components:    Lactic Acid 2.3 (*)     All other components within normal limits   POCT GLUCOSE - Abnormal; Notable for the following components:    POC Glucose 320 (*)     All other components within normal limits   COVID-19, RAPID   CULTURE, BLOOD 1    Narrative:     Source: blood-Adult-suboptimal <5.5oz./set volume       Site: Peripheral Vein            Current Antibiotics: none   CULTURE, BLOOD 2    Narrative:     Source: blood-Adult-suboptimal <5.5oz./set volume       Site: (single bottle)Peripheral Vein            Current Antibiotics: none   CULTURE, ANAEROBIC AND AEROBIC    Narrative:     Source: wound       Site: leaking           Current Antibiotics: none   HEPATIC FUNCTION PANEL   LIPASE   MAGNESIUM   PROCALCITONIN   ANION GAP   OSMOLALITY   BASIC METABOLIC PANEL W/ REFLEX TO MG FOR LOW K   CBC       EMERGENCY DEPARTMENT COURSE:   Vitals:    Vitals:    01/05/22 1145 01/05/22 1300 01/05/22 1630 01/05/22 1945   BP: (!) 99/55 103/61 (!) 115/59 120/60   Pulse: 73 72 76 77   Resp: 18  18 18   Temp: 97.8 °F (36.6 °C)  97.4 °F (36.3 °C) 97.5 °F (36.4 °C)   TempSrc: Oral  Oral Oral   SpO2: 93%  99% 99%   Weight:         Patient was assessed at bedside appropriate labs and imaging were ordered. Patient was given 1 dose of IV antibiotics here. Today I reviewed all the labs and imaging. All within normal limits. He does not appear to have any osteomyelitis. The wound looks pretty bad so I called the hospitalist group and discussed case with them. They graciously accepted the admission. Patient is admitted to the hospitalist service in stable condition pending further evaluation and treatment. CRITICAL CARE:   None    CONSULTS:  Hospitalist    PROCEDURES:  None    FINAL IMPRESSION      1. Cellulitis and abscess of left lower extremity          DISPOSITION/PLAN   Admission    PATIENT REFERRED TO:  No follow-up provider specified.     DISCHARGE MEDICATIONS:  Current Discharge Medication List          (Please note that portions of this note were completed with a voice recognition program.  Efforts were made to edit the dictations but occasionally words are mis-transcribed.)    DO Mookie Gleason DO  01/06/22 9110

## 2022-01-05 NOTE — ED NOTES
ED nurse-to-nurse bedside report    Chief Complaint   Patient presents with    Leg Swelling      LOC: alert and orientated to name, place, date  Vital signs   Vitals:    01/04/22 2205 01/04/22 2255 01/04/22 2329 01/05/22 0059   BP: (!) 155/83 (!) 165/69 121/67 (!) 144/79   Pulse: 95 97 88 93   Resp: 16 16 18 16   Temp: 97.5 °F (36.4 °C)      TempSrc: Oral      SpO2: 99% 99% 99% 97%   Weight: 223 lb (101.2 kg)         Pain:    Pain Interventions: n/a  Pain Goal: 0  Oxygen: No    Current needs required room air   Telemetry: No  LDAs:   Peripheral IV 01/04/22 Right Hand (Active)   Site Assessment Clean;Dry; Intact 01/05/22 0059   Line Status Flushed 01/05/22 0059   Dressing Status Clean;Dry; Intact 01/05/22 0059     Continuous Infusions:   Mobility: Independent  [place] Fall Risk Score:    Fall Risk 6/4/2019   2 or more falls in past year? no   Fall with injury in past year? no     Fall Interventions: call light in reach  Report given to: Ramy Lama RN  01/05/22 4570

## 2022-01-05 NOTE — CONSULTS
Christian Ramirez MD  General Surgery consult   Pt Name: Lynette Zazueta  MRN: [id number]  YOB: 1970  Date of evaluation: 1/5/2022  Primary Care Physician: Moris Wells MD  Consulting Physician Dr. Lion Alvarez  Reason for evaluation: left leg wound       Chief complaint:    Leg wound     SUBJECTIVE:     HPI:    Deirdre Palacios is a 46 y.o.male who was admitted for a left lower extremity wound. Patient is a history of type 2 diabetes that is poorly controlled. Patient states he thinks his blood sugar is usually well controlled. Patient has had diabetic foot wound on the opposite leg ultimately requiring above-the-knee amputation. Patient states he noted he had small ulcers on his leg for a little while not sure how many days or weeks. And then suddenly it became large. He was admitted last week and the cellulitis improved on antibiotics, however when discharged home it became more erythematous and he returned to the emergency department where there was concern he would need debridement. Review of Systems:  Review of Systems   Constitutional: Negative for activity change, appetite change, chills and fatigue. HENT: Negative. Eyes: Negative. Respiratory: Negative. Cardiovascular: Negative. Gastrointestinal: Negative. Genitourinary: Negative. Musculoskeletal: Negative for myalgias and neck pain. Skin: Positive for wound. Neurological: Negative. Hematological: Negative. Psychiatric/Behavioral: Negative. Past Medical History  Past Medical History:   Diagnosis Date    Bipolar 2 disorder [facility])     previously followed with Dr. Tatum Zuniga and Kirsten Leblanc in [facility] BPH (benign prostatic hyperplasia)     COPD (chronic obstructive pulmonary disease) [facility]     Diabetes mellitus type 2, uncontrolled ([facility]     HgbA1c on 4/2/2019 was 9.1.     Diabetic peripheral neuropathy (HCC)     Diabetic polyneuropathy ([facility]     Diabetic ulcer of right foot associated with type 2 diabetes mellitus ([facility] 12/10/2015    Essential hypertension     \"never been on b/p medication that I know of\"    GERD (gastroesophageal reflux disease)     Hammer toe of left foot     Heart murmur     denies any chest pain or palpitations    History of tobacco abuse     Hx of AKA (above knee amputation), right ([facility] 04/18/2019    Dr. Leanna St Hyperlipidemia     Macular edema, diabetic, bilateral ([facility] 05/04/2018    Dr. Leslie Felix referred to retina specialist for 2nd opinion    Marijuana abuse in remission     Melena     MRSA (methicillin resistant staph aureus) culture positive     Onychomycosis     Other disorders of kidney and ureter in diseases classified elsewhere     Sleep apnea     no cpap    Thyroid disease     WD-Skin ulcer of fourth toe of right foot with necrosis of bone ([facility] 6/29/2016       Past Surgical History  Past Surgical History:   Procedure Laterality Date    ABDOMEN SURGERY      ABSCESS DRAINAGE Right     foot    AMPUTATION ABOVE KNEE Right 4/18/2019    RIGHT ABOVE KNEE AMPUTATION performed by Yanely Weinberg MD at [address] [place] [address], LAPAROSCOPIC N/A 4/1/2020    ROBOTIC CHOLECYSTECTOMY performed by Zee Goins MD at [address] [facility] N/A 7/20/2020    CYSTOSCOPY performed by Antony Ornelas MD at [address] [facility] 8/21/[facility]    CYSTOSCOPY, UROLIFT performed by Antony Ornelas MD at [place] [address], ESOPHAGUS      ENDOSCOPY, COLON, DIAGNOSTIC      FOOT DEBRIDEMENT Right 07/01/2016    I & D    GASTROCNEMIUS RECESSION Left 11/12/2021    LEFT LEG GASTROCS RECESSION performed by Fab Parker MD at [address] [facility] [facility] Right 2/18/2019    I&D RIGHT STUMP performed by Yanely Weinberg MD at [address] [facility] Right 07/20/2016    LEG AMPUTATION BELOW KNEE Right 4/4/2019    I&D AND REVISION OF AMPUTATION RIGHT LEG performed by Yanely Weinberg MD at [address] [place] [address] SURGICAL HISTORY Right 1/14/15    sole of foot I&D    MD DRAIN INFECT SHOULDER BURSA Left 8/18/2017    LEFT SHOULDER INCISION AND DRAINAGE performed by Amalia Llanos MD at [address] [facility] OFFICE/OUTPT [address] Right 9/20/2018    EXCISIONAL DEBRIDEMENT RIGHT BKA STUMP [facility]  Right 1/16/15    2nd toe with wound vac applied    UPPER GASTROINTESTINAL ENDOSCOPY N/A 3/3/2020    EGD DILATION SAVORY performed by Renetta Dean MD at [facility]  ? when       Medications  Prior to Admission medications    Medication Sig Start Date End Date Taking? Authorizing Provider   gabapentin (NEURONTIN) 300 MG capsule TAKE 1 CAPSULE BY MOUTH THREE TIMES DAILY.  11/3/21 2/2/22 Yes Phuc Ortega MD   empagliflozin (JARDIANCE) 25 MG tablet Take 1 tablet by mouth daily New dose 8/6/21  Yes Benito Flood MD   Calcium-Phosphorus-Vitamin D (CALCIUM/D3 ADULT GUMMIES PO) Take 2 tablets by mouth daily   Yes Historical Provider, MD   sertraline (ZOLOFT) 50 MG tablet Take 50 mg by mouth daily   Yes Historical Provider, MD   Multiple Vitamins-Minerals (THERAPEUTIC MULTIVITAMIN-MINERALS) tablet Take 1 tablet by mouth daily   Yes Historical Provider, MD   atorvastatin (LIPITOR) 40 MG tablet Take 1 tablet by mouth nightly 7/28/21  Yes Snow Barrios MD   levothyroxine (SYNTHROID) 50 MCG tablet Take 1 tablet by mouth daily 7/28/21  Yes Snow Barrios MD   metoprolol tartrate (LOPRESSOR) 50 MG tablet Take 1 tablet by mouth 2 times daily 7/28/21  Yes Snow Barrios MD   lisinopril (PRINIVIL;ZESTRIL) 5 MG tablet Take 1 tablet by mouth daily 3/17/21  Yes Trenda Koyanagi, APRN - CNP   ARIPiprazole (ABILIFY) 5 MG tablet Take 5 mg by mouth daily  12/16/20  Yes Historical Provider, MD   cephALEXin (KEFLEX) 500 MG capsule Take 1 capsule by mouth 4 times daily for 7 days 1/1/22 1/8/22  Basilia White PA-C   Insulin Disposable Pump (OMNIPOD DASH 5 PACK PODS) MISC CHANGE PODS EVERY 48 HOURS AS DIRECTED 11/29/21   Phuc Ortega MD   Insulin Pen Needle (PEN NEEDLES) 31G X 8 MM MISC For use with insulin injections five timed per day dx:E11.9 11/9/21   Phuc Ortega MD   insulin lispro (HUMALOG KWIKPEN U-200) 200 UNIT/ML SOPN pen 40 units Plus scale - Max dose 160 units per day 11/3/21   Lionel Garrison MD   nitroGLYCERIN (NITROSTAT) 0.3 MG SL tablet Place 1 tablet under the tongue every 5 minutes as needed for Chest pain up to max of 3 total doses.  If no relief after 1 dose, call 911. 8/16/21   Lionel Garrison MD   fenofibrate (TRICOR) 145 MG tablet Take 1 tablet by mouth daily 7/28/21   Eris Ridley MD   Insulin Glargine, 2 Unit Dial, 300 UNIT/ML SOPN Change to 75 units BID and every 10 days increase by 5 units both times, to get am sugars 150-200  Patient taking differently: Change to 80 units BID and every 10 days increase by 5 units both times, to get am sugars 150-200 6/29/21   Phuc Ortega MD   Continuous Blood Gluc Transmit (DEXCOM G6 TRANSMITTER) MISC 1 Device by Does not apply route every 3 months 3/22/21   MARILYN Hill CNP   Continuous Blood Gluc Sensor (DEXCOM G6 SENSOR) MISC 1 Device by Does not apply route every 14 days 3/22/21   MARILYN Hill CNP   Continuous Blood Gluc  (DEXCOM G6 ) LIANNE 1 Device by Does not apply route 4 times daily (before meals and nightly) 3/22/21   MARILYN Hill CNP    Scheduled Meds:   insulin lispro  0-12 Units SubCUTAneous TID WC    insulin lispro  0-6 Units SubCUTAneous Nightly    sodium chloride flush  5-40 mL IntraVENous 2 times per day    enoxaparin  40 mg SubCUTAneous Daily    ARIPiprazole  5 mg Oral Daily    atorvastatin  40 mg Oral Nightly    fenofibrate  160 mg Oral Daily    gabapentin  300 mg Oral TID    levothyroxine  50 mcg Oral Daily    lisinopril  5 mg Oral Daily    metoprolol tartrate  50 mg Oral BID    multivitamin  1 tablet Oral Daily    insulin glargine  75 Units SubCUTAneous BID    pantoprazole  40 mg Oral QAM AC    meropenem  1,000 mg IntraVENous Q8H     Continuous Infusions:   sodium chloride       PRN Meds:.sodium chloride flush, sodium chloride, ondansetron **OR** ondansetron, polyethylene glycol, acetaminophen **OR** acetaminophen, nitroGLYCERIN    Allergies  Allergies   Allergen Reactions    Pcn [Penicillins] Shortness Of Breath, Nausea And Vomiting and Other (See Comments)     Does not remember reactions. Has TOLERATED amoxicillin and several different cephalosporins.  Actos [Pioglitazone] Swelling    Clindamycin/Lincomycin Itching    Vancomycin Hives      Rash, with erythematous plaques to abdomen, shoulders, and proximal upper extremities with pruritis, persisted with 2nd dose administered slower.       Metformin And Related Swelling        Family History  Family History   Problem Relation Age of Onset   Veronika Draft Diabetes Mother     Other Mother         pneumonia, H1N1    Depression Mother     Early Death Mother     High Blood Pressure Mother     High Cholesterol Mother     Vision Loss Maternal Grandmother     Arthritis Maternal Grandfather     Heart Disease Maternal Grandfather        Social History  Social History     Socioeconomic History    Marital status:      Spouse name: None    Number of children: 2    Years of education: 15    Highest education level: None   Occupational History    None   Tobacco Use    Smoking status: Current Some Day Smoker     Packs/day: 0.00     Years: 10.00     Pack years: 0.00     Types: Cigars     Start date: 1985     Last attempt to quit: 2000     Years since quittin.3    Smokeless tobacco: Never Used   Vaping Use    Vaping Use: Former    Substances: Nicotine, Flavoring   Substance and Sexual Activity    Alcohol use: Not Currently     Alcohol/week: 0.0 standard drinks    Drug use: No     Comment: 30 YEARS AGO    Sexual activity: Yes     Partners: Female   Other Topics Concern    None   Social History Narrative    None     Social Determinants of Health     Financial Resource Strain: Low Risk     Difficulty of Paying Living Expenses: Not very hard   Food Insecurity: No Food Insecurity    Worried About Running Out of Food in the Last Year: Never true    Gwen of Food in the Last Year: Never true   Transportation Needs: No Transportation Needs    Lack of Transportation (Medical): No    Lack of Transportation (Non-Medical): No   Physical Activity:     Days of Exercise per Week: Not on file    Minutes of Exercise per Session: Not on file   Stress:     Feeling of Stress : Not on file   Social Connections:     Frequency of Communication with Friends and Family: Not on file    Frequency of Social Gatherings with Friends and Family: Not on file    Attends [employer] Services: Not on file    Active Member of [address] [place] [address] [employer] or Organizations: Not on file    Attends Club or Organization Meetings: Not on file    Marital Status: Not on file   Intimate Partner Violence:     Fear of Current or Ex-Partner: Not on file    Emotionally Abused: Not on file    Physically Abused: Not on file    Sexually Abused: Not on file   Housing Stability:     Unable to Pay for Housing in the Last Year: Not on file    Number of [place] in the Last Year: Not on file    Unstable Housing in the Last Year: Not on file         OBJECTIVE:   CURRENT VITALS:  weight is 223 lb (101.2 kg). His oral temperature is 97.8 °F (36.6 °C). His blood pressure is 103/61 and his pulse is 72. His respiration is 18 and oxygen saturation is 93%. Body mass index is 35.99 kg/m². Physical Exam  Constitutional:       Appearance: He is obese. He is not ill-appearing. Cardiovascular:      Rate and Rhythm: Normal rate. Pulses: Normal pulses. Pulmonary:      Effort: Pulmonary effort is normal.   Abdominal:      Palpations: Abdomen is soft.    Skin:     Comments: Left lower extremity on the pretibial area there is findings consistent with chronic venous stasis disease. He has multiple shallow ulcers. There is redness around this area however it is not warm is not tender to palpation and has no associated fluctuance. There is fibrinous exudate on the top of the wound. Similar to last admission with worse erythema, again there remians no drainable abscess   Neurological:      General: No focal deficit present. Mental Status: He is alert and oriented to person, place, and time. Psychiatric:         Mood and Affect: Mood normal.         Behavior: Behavior normal.          LABS:     Recent Labs     01/04/22  2242   WBC 10.2   HGB 14.2   HCT 40.2*         K 4.1   CL 99   CO2 22*   BUN 20   CREATININE 0.9   MG 2.0   CALCIUM 9.6   AST 27   ALT 22   BILITOT 0.3   BILIDIR <0.2   LIPASE 17.1       RADIOLOGY:   I have personally reviewed the following films:  XR TIBIA FIBULA LEFT (2 VIEWS)   Final Result      Poorly visualized lucency at the lateral malleolus suspicious for nondisplaced fracture. Final report electronically signed by Dr. Malia Foss on 1/4/2022 11:06 PM          IMPRESSIONS/PLAN   Patient with superficial wound to his left lower extremity likely secondary to chronic venous insufficiency and diaebetes and poor compliance. I spoke with  Dr. Kathy Gonzalez about the patients wounds, He is in agreement that the wound would not benefit from debridement. There is no drainable abscess. If patient were to have any surgical intervention it is unlikely that he would heal the wound. His most likely chance for successful healing will be antibioitcs and compression.           Electronically signed by Drew Cooper MD on 1/5/2022 at 2:22 PM

## 2022-01-05 NOTE — PLAN OF CARE
Patient seen after midnight. Labs reviewed and examined by me. ID and General Surgery have been consulted. No plan for surgical intervention at this time. Continue with antibiotics and compression. Start IVF given elevated lactic acid, trend LA.     Electronically signed by Ester Peraza PA-C on 1/5/2022 at 3:54 PM

## 2022-01-05 NOTE — ED NOTES
Called [facility].  Will take pt to floor in 10 minutes per request.      Agapito Zaldivar RN  01/05/22 [id number]

## 2022-01-05 NOTE — ED NOTES
Pt sitting in WC. Denies all needs. Pt given water. Call light in reach.       Dearl BEA Ribera  01/05/22 [id number]

## 2022-01-05 NOTE — CONSULTS
CONSULTATION NOTE :ID       Patient - Usha Toledo,  Age - 46 y.o.    - 1970      Room Number - 7K-22/022-A   MRN -  [id number]   Acct # - [de-identified]  Date of Admission -  2022  9:51 PM  Patient's PCP: Benito Flood MD     Requesting Physician: Cori Felton PA-C    REASON FOR CONSULTATION   Left leg non healing wound  CHIEF COMPLAINT   Swelling and drainage from the left leg    HISTORY OF PRESENT ILLNESS       This is a very pleasant 46 y.o. male who was admitted to the hospital with a chief complaints of swelling and drainage from the left leg. He was recently discharged from the facility after he was treated for left leg wound with cellulites. The leg improved with antibiotic and compression and was discharged however he came back due to swelling. No fever or chills. the leg was swollen, he was not compliant with compression wrap, he has hx of right above knee amputaion. He has diabetes with neuropathy, no leukocytosis. PAST MEDICAL  HISTORY       Past Medical History:   Diagnosis Date    Bipolar 2 disorder [facility])     previously followed with Dr. Mp Wei and Elisha Dwyer [facility] [place] BPH (benign prostatic hyperplasia)     COPD (chronic obstructive pulmonary disease) ([address]     Diabetes mellitus type 2, uncontrolled ([address]     HgbA1c on 2019 was 9.1.     Diabetic peripheral neuropathy (HCC)     Diabetic polyneuropathy ([address]     Diabetic ulcer of right foot associated with type 2 diabetes mellitus ([address] 12/10/2015    Essential hypertension     \"never been on b/p medication that I know of\"    GERD (gastroesophageal reflux disease)     Hammer toe of left foot     Heart murmur     denies any chest pain or palpitations    History of tobacco abuse     Hx of AKA (above knee amputation), right ([address] 2019    Dr. Wilder Camilo    Hyperlipidemia     Macular edema, diabetic, bilateral ([address] 2018    Dr. Mani Laughlin referred to retina specialist for 2nd opinion    Marijuana abuse in remission     Melena     MRSA (methicillin resistant staph aureus) culture positive     Onychomycosis     Other disorders of kidney and ureter in diseases classified elsewhere     Sleep apnea     no cpap    Thyroid disease     WD-Skin ulcer of fourth toe of right foot with necrosis of bone ([address] 6/29/2016       PAST SURGICAL HISTORY     Past Surgical History:   Procedure Laterality Date    ABDOMEN SURGERY      ABSCESS DRAINAGE Right     foot    AMPUTATION ABOVE KNEE Right 4/18/2019    RIGHT ABOVE KNEE AMPUTATION performed by João Walton MD at [address] [facility], LAPAROSCOPIC N/A 4/1/2020    ROBOTIC CHOLECYSTECTOMY performed by Mikie Aceves MD at [address] [facility] N/A 7/20/2020    CYSTOSCOPY performed by Matt Osorio MD [facility] [address] [facility] 8/21/2020    CYSTOSCOPY, UROLIFT performed by Matt Osorio MD at [address] [facility], ESOPHAGUS      ENDOSCOPY, COLON, DIAGNOSTIC      FOOT DEBRIDEMENT Right 07/01/2016    I & D    GASTROCNEMIUS RECESSION Left 11/12/2021    LEFT LEG GASTROCS RECESSION performed by Charis Iniguez MD at [address] [facility] Right 2/18/2019    I&D RIGHT STUMP performed by João Walton MD at [address] [facility] [facility] Right 07/20/2016    LEG AMPUTATION BELOW KNEE Right 4/4/2019    I&D AND REVISION OF AMPUTATION RIGHT LEG performed by João Walton MD at [address] [facility] Right 1/14/15    sole of foot I&D    CT DRAIN INFECT SHOULDER BURSA Left 8/18/2017    LEFT SHOULDER INCISION AND DRAINAGE performed by João Walton MD at [address] OFFICE/OUTPT [address] Right 9/20/2018    EXCISIONAL DEBRIDEMENT RIGHT BKA STUMP performed by Sonja Sellers MD at [address] [facility] Right 1/16/15    2nd toe with wound vac applied    UPPER GASTROINTESTINAL ENDOSCOPY N/A 3/3/2020    EGD DILATION SAVORY performed by Michaeleen Klinefelter, MD at [address] [facility]  ? when         MEDICATIONS:       Scheduled Meds:   insulin lispro  0-12 Units SubCUTAneous TID WC    insulin lispro  0-6 Units SubCUTAneous Nightly    sodium chloride flush  5-40 mL IntraVENous 2 times per day    enoxaparin  40 mg SubCUTAneous Daily    ARIPiprazole  5 mg Oral Daily    atorvastatin  40 mg Oral Nightly    fenofibrate  160 mg Oral Daily    gabapentin  300 mg Oral TID    levothyroxine  50 mcg Oral Daily    lisinopril  5 mg Oral Daily    metoprolol tartrate  50 mg Oral BID    multivitamin  1 tablet Oral Daily    insulin glargine  75 Units SubCUTAneous BID    meropenem  1,000 mg IntraVENous Q8H     Continuous Infusions:   sodium chloride       PRN Meds:sodium chloride flush, sodium chloride, ondansetron **OR** ondansetron, polyethylene glycol, acetaminophen **OR** acetaminophen, nitroGLYCERIN  Allergies:   ALLERGIES:    Pcn [penicillins], Actos [pioglitazone], Clindamycin/lincomycin, Vancomycin, and Metformin and related        SOCIAL HISTORY:     TOBACCO:   reports that he has been smoking cigars. He started smoking about 36 years ago. He has been smoking about 0.00 packs per day for the past 10.00 years. He has never used smokeless tobacco.     ETOH:   reports previous alcohol use. Patient currently lives in a [facility] house      FAMILY HISTORY:         Problem Relation Age of Onset    Diabetes Mother     Other Mother         pneumonia, H1N1    Depression Mother     Early Death Mother     High Blood Pressure Mother     High Cholesterol Mother     Vision Loss Maternal Grandmother     Arthritis Maternal Grandfather     Heart Disease Maternal Grandfather        REVIEW OF SYSTEMS:     Constitutional: no fever, no night sweats, no fatigue, no weight loss. Head: no head ache , no head injury, no migranes. Eye: no eye discharge, blurring of vision, no double vision,no eye pain.   Ears: no hearing difficulty, no tinnitus  Mouth/throat: no ulceration, dental caries , dysphagia, no hoarseness and voice change  Respiratory: no cough no chest pain,no shortness of breath,no wheezing  CVS: no palpitation, no chest pain,   GI: no abdominal pain, no nausea , no vomiting, no constipation,no diarrhea. ISMAEL: no dysuria, frequency and urgency, no hematuria, no kidney stones  Musculoskeletal:hx of right above knee amputation  Endocrine: no polyuria, polydipsia, no cold or heat intolerance  Hematology: no anemia, no easy brusing or bleeding, no hx of clotting disorder  Dermatology: no skin rash, no skin lesions, no pruritis,  Neurological:no headaches,no dizziness, no seizure, no numbness. Psychiatry: no depression, no anxiety,no panic attacks, no suicide ideation    PHYSICAL EXAM:     /61   Pulse 92   Temp 97.7 °F (36.5 °C) (Oral)   Resp 18   Wt 223 lb (101.2 kg)   SpO2 95%   BMI 35.99 kg/m²   General apperance:  Awake, alert, not in distress. HEENT: pink conjunctiva, unicteric sclera, moist oral mucosa. Chest:  Bilateral air entry  Cardiovascular:  RRR ,S1S2, no murmur or gallop. Abdomen:  Soft, non tender to palpation. Extremities: the left leg is swollen with drainage the wound has slough which was easily removed with a gauze. The wound bed is clean, clear drainage noted, the leg is red, non tender  Skin:  Warm and dry. CNS:awake and oriented        LABS:     CBC:   Recent Labs     01/04/22 2242   WBC 10.2   HGB 14.2        BMP:    Recent Labs     01/04/22 2242      K 4.1   CL 99   CO2 22*   BUN 20   CREATININE 0.9   GLUCOSE 273*     Calcium:  Recent Labs     01/04/22 2242   CALCIUM 9.6     Ionized Calcium:No results for input(s): IONCA in the last 72 hours.   Magnesium:  Recent Labs     01/04/22 2242   MG 2.0    Hepatic:   Recent Labs     01/04/22 2242   ALKPHOS 66   ALT 22   AST 27   PROT 7.4   BILITOT 0.3   BILIDIR <0.2   LABALBU 3.9     Micro:   Lab Results   Component Value Date    BC No growth-preliminary No growth  12/29/2021       Problem list of patient      Patient Active Problem List   Diagnosis Code    Status post below knee amputation of right lower extremity ([facility] Z89.511    Gait disturbance R26.9    Sebaceous cyst L72.3    Uncontrolled type 2 diabetes mellitus with hyperglycemia, with long-term current use of insulin ([facility]) E11.65, Z79.4    Severe bipolar II disorder, recent episode major depressive, remission ([facility]) F31.81    Bipolar 1 disorder ([facility]) F31.9    Leukocytosis D72.829    Lactic acidosis E87.2    Hyponatremia E87.1    Normocytic anemia D64.9    Obesity (BMI 30-39. 9) E66.9    History of noncompliance with medical treatment Z91.19    Essential hypertension I10    Tobacco dependence F17.200    Gastroesophageal reflux disease without esophagitis K21.9    History of marijuana use Z87.898    Physical debility R53.81    Anemia D64.9    Electrolyte imbalance E87.8    Type 2 diabetes mellitus with hyperglycemia, with long-term current use of insulin ([facility]) E11.65, Z79.4    Weakness R53.1    Infection of above knee amputation stump of right leg ([facility]) T87.43    Above knee amputation of right lower extremity ([facility]) Q68.392N    Sepsis ([facility]) A41.9    Vitamin D deficiency E55.9    COPD exacerbation ([facility]) J44.1    Influenza B J10.1    Depression, recurrent ([facility]) F33.9    Major depressive disorder, recurrent ([facility]) F33.9    GI bleed K92.2    Elevated lipase R74.8    Other psychoactive substance abuse, uncomplicated ([facility]) O48.56    Calculus of gallbladder without cholecystitis without obstruction K80.20    Right upper quadrant abdominal pain R10.11    Pedal edema R60.0    MURALI (obstructive sleep apnea) G47.33    Shortness of breath R06.02    Hypothyroid E03.9    Anxiety and depression F41.9, F32. A    Dyspnea R06.00    Sinus tachycardia F03.7    Metabolic acidosis F71.6    Edema of left lower extremity R60.0    SOB (shortness of breath) on exertion R06.02    Intermittent palpitations R00.2    History of chest pain Z87.898    Dizziness, nonspecific R42    Hyperlipidemia LDL goal <70 E78.5    Neuropathy G62.9    Diabetic ulcer of left lower leg associated with type 2 diabetes mellitus (HCC) E11.622, L97.929    Nonhealing ulcer of left lower extremity with fat layer exposed ([facility] W00.459    Cellulitis and abscess of left lower extremity L03.116, L02.416           Impression and Recommendation:   Left lower leg non healing wound  Infection Control:     Discharge Planning (Coordination of out patient care: Thank you Thien Perez PA-C for allowing me to participate in this patient's care.     Jaspreet Merrill MD, MD,FACP 1/5/2022 8:21 AM

## 2022-01-05 NOTE — CARE COORDINATION
01/05/22 0908   Readmission Assessment   Number of Days since last admission? 1-7 days   Previous Disposition Home with Home Health   Who is being Interviewed Patient   What was the patient's/caregiver's perception as to why they think they needed to return back to the hospital? Other (Comment)  (drainage from my lower leg)   Did you visit your Primary Care Physician after you left the hospital, before you returned this time? No   Why weren't you able to visit your PCP? Other (Comment)  (appt schedued for Jan 18, 2022)   Who advised the patient to return to the hospital? Self-referral   Does the patient report anything that got in the way of taking their medications? No   In our efforts to provide the best possible care to you and others like you, can you think of anything that we could have done to help you after you left the hospital the first time, so that you might not have needed to return so soon?  Other (Comment)  (no nothing)

## 2022-01-05 NOTE — PROGRESS NOTES
Pt admitted to  [address] in a wheelchair and from ED. Complaints: None. IV none infusing into the hand right, condition patent and no redness at a rate of 0 mls/ hour with about 0 mls in the bag still. IV site free of s/s of infection or infiltration. Vital signs obtained. Assessment and data collection initiated. Two nurse skin assessment performed by Kourtney Rose and Anais Bhatt RN. Oriented to room. Explained patients right to have family, representative or physician notified of their admission. Patient has Declined for physician to be notified. Patient has Declined for family/representative to be notified. The patient is interested in [facility]. Joanns meds to beds program?:  No    Policies and procedures for [facility] explained. All questions answered with no further questions at this time. Fall prevention and safety brochure discussed with patient. Bed alarm on. Call light in reach.

## 2022-01-05 NOTE — CARE COORDINATION
1/5/22, 7:52 AM EST  DISCHARGE PLANNING EVALUATION:    Madan Ahn       Admitted: 1/4/2022/ 2151   Hospital day: 0   Location: 7K-22/022-A Reason for admit: Nonhealing ulcer of left lower extremity with fat layer exposed ([address] [L97.922]  Cellulitis and abscess of left lower extremity [L03.116, L02.416]   PMH:  has a past medical history of Bipolar 2 disorder ([address] BPH (benign prostatic hyperplasia), COPD (chronic obstructive pulmonary disease) ([address] Diabetes mellitus type 2, uncontrolled ([address] Diabetic peripheral neuropathy ([address] Diabetic polyneuropathy ([address] Diabetic ulcer of right foot associated with type 2 diabetes mellitus ([address] Essential hypertension, GERD (gastroesophageal reflux disease), Hammer toe of left foot, Heart murmur, History of tobacco abuse, Hx of AKA (above knee amputation), right ([address] Hyperlipidemia, Macular edema, diabetic, bilateral ([address] Marijuana abuse in remission, Melena, MRSA (methicillin resistant staph aureus) culture positive, Onychomycosis, Other disorders of kidney and ureter in diseases classified elsewhere, Sleep apnea, Thyroid disease, and 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 To ED with nonhealing infected diabetic ulcer, failure of outpatient treatment  Procedure:   Xray left tibia/fibula: Poorly visualized lucency at the lateral malleolus suspicious for nondisplaced fracture. Barriers to Discharge: Follow blood cultures, lactic acid, wound care consulted, ID and general surgery consulted. DM management, Merrem IV q 8 hrs. Zofran IV. PCP: Yan Fisher MD  Readmission Risk Score: 19.9 ( )%    Patient Goals/Plan/Treatment Preferences: Readmit, met with Rosario Carmen who currently lives home with friends and current with [facility]. He has transportation and needed DME including manual wheelchair. Confirmed insurance and PCP; SW to follow up with  HH. Transportation/Food Security/Housekeeping Addressed:  No issues identified.

## 2022-01-05 NOTE — ED NOTES
Pt sitting in WC per request. Denies all needs. Resp regular. Dr. Hair Flatter in room to see pt at this time. Call light in [facility].       Amanda Fink RN  01/05/22 8305

## 2022-01-06 LAB
ANION GAP SERPL CALCULATED.3IONS-SCNC: 12 MEQ/L (ref 8–16)
BUN BLDV-MCNC: 25 MG/DL (ref 7–22)
CALCIUM SERPL-MCNC: 8.8 MG/DL (ref 8.5–10.5)
CHLORIDE BLD-SCNC: 105 MEQ/L (ref 98–111)
CO2: 20 MEQ/L (ref 23–33)
CREAT SERPL-MCNC: 1 MG/DL (ref 0.4–1.2)
ERYTHROCYTE [DISTWIDTH] IN BLOOD BY AUTOMATED COUNT: 14.6 % (ref 11.5–14.5)
ERYTHROCYTE [DISTWIDTH] IN BLOOD BY AUTOMATED COUNT: 50.4 FL (ref 35–45)
GFR SERPL CREATININE-BSD FRML MDRD: 78 ML/MIN/1.73M2
GLUCOSE BLD-MCNC: 158 MG/DL (ref 70–108)
GLUCOSE BLD-MCNC: 194 MG/DL (ref 70–108)
HCT VFR BLD CALC: 39.3 % (ref 42–52)
HEMOGLOBIN: 13 GM/DL (ref 14–18)
MCH RBC QN AUTO: 31 PG (ref 26–33)
MCHC RBC AUTO-ENTMCNC: 33.1 GM/DL (ref 32.2–35.5)
MCV RBC AUTO: 93.8 FL (ref 80–94)
PLATELET # BLD: 218 THOU/MM3 (ref 130–400)
PMV BLD AUTO: 9.4 FL (ref 9.4–12.4)
POTASSIUM REFLEX MAGNESIUM: 4.4 MEQ/L (ref 3.5–5.2)
RBC # BLD: 4.19 MILL/MM3 (ref 4.7–6.1)
SODIUM BLD-SCNC: 137 MEQ/L (ref 135–145)
WBC # BLD: 10.5 THOU/MM3 (ref 4.8–10.8)

## 2022-01-06 PROCEDURE — 6360000002 HC RX W HCPCS: Performed by: EMERGENCY MEDICINE

## 2022-01-06 PROCEDURE — 99233 SBSQ HOSP IP/OBS HIGH 50: CPT | Performed by: PHYSICIAN ASSISTANT

## 2022-01-06 PROCEDURE — 6370000000 HC RX 637 (ALT 250 FOR IP): Performed by: PHYSICIAN ASSISTANT

## 2022-01-06 PROCEDURE — 6370000000 HC RX 637 (ALT 250 FOR IP): Performed by: HOSPITALIST

## 2022-01-06 PROCEDURE — 2580000003 HC RX 258: Performed by: EMERGENCY MEDICINE

## 2022-01-06 PROCEDURE — 82948 REAGENT STRIP/BLOOD GLUCOSE: CPT

## 2022-01-06 PROCEDURE — 6360000002 HC RX W HCPCS: Performed by: HOSPITALIST

## 2022-01-06 PROCEDURE — 85027 COMPLETE CBC AUTOMATED: CPT

## 2022-01-06 PROCEDURE — 80048 BASIC METABOLIC PNL TOTAL CA: CPT

## 2022-01-06 PROCEDURE — 1200000000 HC SEMI PRIVATE

## 2022-01-06 PROCEDURE — 2580000003 HC RX 258: Performed by: HOSPITALIST

## 2022-01-06 PROCEDURE — 36415 COLL VENOUS BLD VENIPUNCTURE: CPT

## 2022-01-06 RX ADMIN — ATORVASTATIN CALCIUM 40 MG: 40 TABLET, FILM COATED ORAL at 20:11

## 2022-01-06 RX ADMIN — GABAPENTIN 300 MG: 300 CAPSULE ORAL at 14:37

## 2022-01-06 RX ADMIN — FENOFIBRATE 160 MG: 160 TABLET ORAL at 08:10

## 2022-01-06 RX ADMIN — SODIUM CHLORIDE, PRESERVATIVE FREE 10 ML: 5 INJECTION INTRAVENOUS at 20:10

## 2022-01-06 RX ADMIN — INSULIN LISPRO 2 UNITS: 100 INJECTION, SOLUTION INTRAVENOUS; SUBCUTANEOUS at 08:20

## 2022-01-06 RX ADMIN — INSULIN GLARGINE 75 UNITS: 100 INJECTION, SOLUTION SUBCUTANEOUS at 21:18

## 2022-01-06 RX ADMIN — Medication 1 TABLET: at 08:10

## 2022-01-06 RX ADMIN — GABAPENTIN 300 MG: 300 CAPSULE ORAL at 20:11

## 2022-01-06 RX ADMIN — MEROPENEM 1000 MG: 1 INJECTION, POWDER, FOR SOLUTION INTRAVENOUS at 05:34

## 2022-01-06 RX ADMIN — GABAPENTIN 300 MG: 300 CAPSULE ORAL at 08:10

## 2022-01-06 RX ADMIN — LEVOTHYROXINE SODIUM 50 MCG: 0.05 TABLET ORAL at 05:31

## 2022-01-06 RX ADMIN — SODIUM CHLORIDE 25 ML: 9 INJECTION, SOLUTION INTRAVENOUS at 20:02

## 2022-01-06 RX ADMIN — METOPROLOL TARTRATE 50 MG: 50 TABLET ORAL at 20:11

## 2022-01-06 RX ADMIN — MEROPENEM 1000 MG: 1 INJECTION, POWDER, FOR SOLUTION INTRAVENOUS at 11:35

## 2022-01-06 RX ADMIN — ENOXAPARIN SODIUM 40 MG: 100 INJECTION SUBCUTANEOUS at 08:10

## 2022-01-06 RX ADMIN — ARIPIPRAZOLE 5 MG: 5 TABLET ORAL at 08:10

## 2022-01-06 RX ADMIN — INSULIN GLARGINE 75 UNITS: 100 INJECTION, SOLUTION SUBCUTANEOUS at 08:29

## 2022-01-06 RX ADMIN — MEROPENEM 1000 MG: 1 INJECTION, POWDER, FOR SOLUTION INTRAVENOUS at 20:03

## 2022-01-06 RX ADMIN — PANTOPRAZOLE SODIUM 40 MG: 40 TABLET, DELAYED RELEASE ORAL at 05:31

## 2022-01-06 RX ADMIN — METOPROLOL TARTRATE 50 MG: 50 TABLET ORAL at 08:10

## 2022-01-06 RX ADMIN — LISINOPRIL 5 MG: 5 TABLET ORAL at 08:10

## 2022-01-06 RX ADMIN — INSULIN LISPRO 2 UNITS: 100 INJECTION, SOLUTION INTRAVENOUS; SUBCUTANEOUS at 11:36

## 2022-01-06 RX ADMIN — INSULIN LISPRO 4 UNITS: 100 INJECTION, SOLUTION INTRAVENOUS; SUBCUTANEOUS at 16:52

## 2022-01-06 ASSESSMENT — PAIN DESCRIPTION - PAIN TYPE
TYPE: CHRONIC PAIN

## 2022-01-06 ASSESSMENT — PAIN SCALES - GENERAL
PAINLEVEL_OUTOF10: 6
PAINLEVEL_OUTOF10: 0
PAINLEVEL_OUTOF10: 6
PAINLEVEL_OUTOF10: 4

## 2022-01-06 NOTE — PROGRESS NOTES
Hospitalist Progress Note      Patient:  Edra Hashimoto    Unit/Bed:7K-22/022-A  YOB: 1970  MRN: [id number]   Acct: [de-identified]   PCP: Celina Caballero MD  Date of Admission: 1/4/2022    Assessment/Plan:      1. Nonhealing infected diabetic ulcer, failure of outpatient treatment: recent admission for infected ulcer. Patient was treated with IV Rocephin while hospitalized, and was discharged on Keflex. It appears that this patient's wound was not responding to above antibiotics. ID consulted for IV abx recs. Will discharge with PO abx per ID tomorrow with [place] Poisson boot. Gen Surg, Dr. Erika Granados, was also consulted.     2. Insulin-dependent diabetes mellitus type 2: We will hold patient's home dosage of Jardiance, we will continue with Lantus and insulin sliding scale. Patient's last A1c from 1/1/2022 was noted to be 7.1%.     3.  Essential Hypertension: We will continue with patient's home medications of metoprolol and lisinopril.     4.  Depression: Patient normally is on sertraline. This was held during his last hospitalization due to treatment with Zyvox as a precaution for potential interaction and serotonin syndrome. We will again hold this agent in case patient may need to go back on Zyvox. For now we will treat with ceftriaxone as noted above.     5. Hypothyroidism: We will continue with patient's levothyroxine.     6.  Calf tenderness: Patient had recent Achilles tendon release in November. He had a venous ultrasound on 12/3/2021, and again on 12/29/2021, both times were negative for DVT.     7. DVT prophylaxis: Lovenox daily injection.     Chief Complaint: Persistent nonhealing infected diabetic ulcer, failure of outpatient treatment    Initial H and P:-    \"Jamie Cueto is a 46 y.o. male with PMHx of insulin-dependent diabetes mellitus type 2 with prior nonhealing infections leading to amputations, recently admitted here from 12/29/2021 until 1/1/2022 for management of left lower extremity infected ulcer, who presented to [facility] again with failure of outpatient management.      During patient's last admission, he was seen both by Dr. Caity Glass and Dr. Bal Bolivar. At that time, there were no areas of necrosis or abscess to drain, and patient was initially treated with Zyvox. This was since changed to ceftriaxone. As there was no recommendations for surgical intervention at that time, decision was made to discharge home with a trial of outpatient antibiotics. He was discharged on Keflex.     Patient reports that he continue to experience subjective fever and chills, although he did not present with fever here. He was concerned that his wound was not improving, possibly worsening, therefore came back to the ED as noted.     In the ED, patient had mild tachycardia, but was not febrile, and did not have leukocytosis. He therefore did not meet sepsis criteria. However, initial lactic acid level was 3.1, and subsequent lactic acid levels improved to 2.5. X-ray of the left lower extremity demonstrated lucency at the lateral malleolus, no dislocation identified.     Patient was empirically started on IV antibiotics. We were asked to admit him for further management. \"    Subjective (past 24 hours):   1/6: pt doing well, no new complaints. Denies any fever/chills. No CP/SOB. No plan for surgical intervention. Past medical history, family history, social history and allergies reviewed again and is unchanged since admission. ROS (All review of systems completed. Pertinent positives noted.  Otherwise All other systems reviewed and negative.)     Medications:  Reviewed    Infusion Medications    sodium chloride      sodium chloride 75 mL/hr at 01/05/22 1700     Scheduled Medications    insulin lispro  0-12 Units SubCUTAneous TID WC    insulin lispro  0-6 Units SubCUTAneous Nightly    sodium chloride flush  5-40 mL IntraVENous 2 times per day    enoxaparin  40 mg SubCUTAneous Daily    ARIPiprazole  5 mg Oral Daily    atorvastatin  40 mg Oral Nightly    fenofibrate  160 mg Oral Daily    gabapentin  300 mg Oral TID    levothyroxine  50 mcg Oral Daily    lisinopril  5 mg Oral Daily    metoprolol tartrate  50 mg Oral BID    multivitamin  1 tablet Oral Daily    insulin glargine  75 Units SubCUTAneous BID    pantoprazole  40 mg Oral QAM AC    meropenem  1,000 mg IntraVENous Q8H     PRN Meds: sodium chloride flush, sodium chloride, ondansetron **OR** ondansetron, polyethylene glycol, acetaminophen **OR** acetaminophen, nitroGLYCERIN      Intake/Output Summary (Last 24 hours) at 1/6/2022 1508  Last data filed at 1/6/2022 1327  Gross per 24 hour   Intake 1755 ml   Output 1675 ml   Net 80 ml       Diet:  ADULT DIET; Regular; 4 carb choices (60 gm/meal)    Exam:  BP (!) 125/59   Pulse 72   Temp 97.6 °F (36.4 °C) (Oral)   Resp 18   Wt 223 lb (101.2 kg)   SpO2 100%   BMI 35.99 kg/m²   General appearance: obese, no apparent distress, appears stated age and cooperative. HEENT: Pupils equal, round, and reactive to light. Conjunctivae/corneas clear. Neck: Supple, with full range of motion. No jugular venous distention. Trachea midline. Respiratory:  Normal respiratory effort. Clear to auscultation, bilaterally without Rales/Wheezes/Rhonchi. Cardiovascular: Regular rate and rhythm with normal S1/S2 without murmurs, rubs or gallops. Abdomen: Soft, non-tender, non-distended with normal bowel sounds. Musculoskeletal: R AKA, LLE wound wrapped (not visualized)  Skin: Skin color, texture, turgor normal.  No rashes or lesions. Neurologic:  Neurovascularly intact without any focal sensory/motor deficits.  Cranial nerves: II-XII intact, grossly non-focal.  Psychiatric: Alert and oriented, thought content appropriate, normal insight  Capillary Refill: Brisk,< 3 seconds   Peripheral Pulses: +2 palpable, equal bilaterally     Labs:   Recent Labs 01/04/22 2242 01/06/22  0454   WBC 10.2 10.5   HGB 14.2 13.0*   HCT 40.2* 39.3*    218     Recent Labs     01/04/22 2242 01/06/22  0454    137   K 4.1 4.4   CL 99 105   CO2 22* 20*   BUN 20 25*   CREATININE 0.9 1.0   CALCIUM 9.6 8.8     Recent Labs     01/04/22 2242   AST 27   ALT 22   BILIDIR <0.2   BILITOT 0.3   ALKPHOS 66     No results for input(s): INR in the last 72 hours. No results for input(s): Lacey Castor in the last 72 hours. Microbiology:    Blood culture #1:   Lab Results   Component Value Date    BC No growth-preliminary  01/04/2022       Blood culture #2:No results found for: Nahomi Quan    Organism:  Lab Results   Component Value Date    ORG Staphylococcus aureus 11/30/2020         Lab Results   Component Value Date    LABGRAM  01/04/2022     Few segmented neutrophils observed. Rare epithelial cells observed. No organisms observed. MRSA culture only:No results found for: [facility]    Urine culture: No results found for: LABURIN    Respiratory culture: No results found for: CULTRESP    Aerobic and Anaerobic :  Lab Results   Component Value Date    LABAERO  01/04/2022     No growth-preliminary Culture yielded light growth of Staphylococcus species (coagulase negative) and gram positive bacilli most consistent with Bacillus species. Clinical correlation required. Lab Results   Component Value Date    LABANAE  11/30/2020     No growth-preliminary No anaerobes isolated- preliminary No anaerobes isolated        Urinalysis:      Lab Results   Component Value Date    NITRU NEGATIVE 12/30/2021    WBCUA 0-2 12/30/2021    BACTERIA NONE SEEN 12/30/2021    RBCUA 0-2 12/30/2021    BLOODU NEGATIVE 12/30/2021    SPECGRAV 1.025 07/27/2021    GLUCOSEU >= 1000 12/30/2021       Radiology:  XR TIBIA FIBULA LEFT (2 VIEWS)   Final Result      Poorly visualized lucency at the lateral malleolus suspicious for nondisplaced fracture.       Final report electronically signed by Dr. Dolores Arana Beto Alejandro on 1/4/2022 11:06 PM        XR TIBIA FIBULA LEFT (2 VIEWS)    Result Date: 1/4/2022  PROCEDURE: XR TIBIA FIBULA LEFT (2 VIEWS) CLINICAL INFORMATION: Cellulitis and wound on left foreleg TECHNIQUE: 4 views of the left tibia and fibula COMPARISON: Left tibia and fibula 12/29/2021 FINDINGS: There is suggestion of a lucency at the lateral malleolus. However, this area is poorly visualized on the submitted images. No dislocation is identified. An osteophyte is present at the plantar surface of the calcaneus. Tendinous calcifications  are noted at the posterior calcaneus. Poorly visualized lucency at the lateral malleolus suspicious for nondisplaced fracture.  Final report electronically signed by Dr. Monie Davis on 1/4/2022 11:06 PM      Electronically signed by Mary Velasco PA-C on 1/6/2022 at 3:08 PM

## 2022-01-06 NOTE — PROGRESS NOTES
Progress note: Infectious diseases    Patient - Ney Reyez,  Age - 46 y.o.    - 1970      Room Number - 7K-22/022-A   MRN -  [id number]   Acct # - [de-identified]  Date of Admission -  2022  9:51 PM    SUBJECTIVE:   Feels better. OBJECTIVE   VITALS    weight is 223 lb (101.2 kg). His oral temperature is 97.6 °F (36.4 °C). His blood pressure is 125/59 (abnormal) and his pulse is 72. His respiration is 18 and oxygen saturation is 100%. Wt Readings from Last 3 Encounters:   22 223 lb (101.2 kg)   21 223 lb (101.2 kg)   21 223 lb (101.2 kg)       I/O (24 Hours)    Intake/Output Summary (Last 24 hours) at 2022 1253  Last data filed at 2022 1128  Gross per 24 hour   Intake 1205 ml   Output 1375 ml   Net -170 ml       General Appearance  Awake, alert, oriented,  not  In acute distress  HEENT - normocephalic, atraumatic, pink conjunctiva,  anicteric sclera  Neck - Supple, no mass  Lungs -  Bilateral good air entry, no rhonchi, no wheeze  Cardiovascular - Heart sounds are normal.  Regular rate and rhythm without murmur, gallop or rub. Abdomen - soft, not distended, nontender,   Neurologic -oriented  Skin - No bruising or bleeding  Extremities -right above-knee amputation.   The swelling and redness on his left leg is much improved following compression therapy    MEDICATIONS:      insulin lispro  0-12 Units SubCUTAneous TID     insulin lispro  0-6 Units SubCUTAneous Nightly    sodium chloride flush  5-40 mL IntraVENous 2 times per day    enoxaparin  40 mg SubCUTAneous Daily    ARIPiprazole  5 mg Oral Daily    atorvastatin  40 mg Oral Nightly    fenofibrate  160 mg Oral Daily    gabapentin  300 mg Oral TID    levothyroxine  50 mcg Oral Daily    lisinopril  5 mg Oral Daily    metoprolol tartrate  50 mg Oral BID    multivitamin  1 tablet Oral Daily    insulin glargine  75 Units SubCUTAneous BID    pantoprazole  40 mg Oral QAM AC    meropenem  1,000 mg IntraVENous Q8H      sodium chloride      sodium chloride 75 mL/hr at 01/05/22 1700     sodium chloride flush, sodium chloride, ondansetron **OR** ondansetron, polyethylene glycol, acetaminophen **OR** acetaminophen, nitroGLYCERIN      LABS:     CBC:   Recent Labs     01/04/22 2242 01/06/22  0454   WBC 10.2 10.5   HGB 14.2 13.0*    218     BMP:    Recent Labs     01/04/22 2242 01/06/22  0454    137   K 4.1 4.4   CL 99 105   CO2 22* 20*   BUN 20 25*   CREATININE 0.9 1.0   GLUCOSE 273* 158*     Calcium:  Recent Labs     01/06/22 0454   CALCIUM 8.8     Ionized Calcium:No results for input(s): IONCA in the last 72 hours. Magnesium:  Recent Labs     01/04/22 2242   MG 2.0     Phosphorus:No results for input(s): PHOS in the last 72 hours. BNP:No results for input(s): BNP in the last 72 hours. Glucose:  Recent Labs     01/05/22  1623   POCGLU 320*     HgbA1C: No results for input(s): LABA1C in the last 72 hours. INR: No results for input(s): INR in the last 72 hours. Hepatic:   Recent Labs     01/04/22 2242   ALKPHOS 66   ALT 22   AST 27   PROT 7.4   BILITOT 0.3   BILIDIR <0.2   LABALBU 3.9     Amylase and Lipase:  Recent Labs     01/05/22  1625   LACTA 2.3*     Lactic Acid:   Recent Labs     01/05/22  1625   LACTA 2.3*       Problem list of patient:     Patient Active Problem List   Diagnosis Code    Status post below knee amputation of right lower extremity ([facility] [address] Z89.511    Gait disturbance R26.9    Sebaceous cyst L72.3    Uncontrolled type 2 diabetes mellitus with hyperglycemia, with long-term current use of insulin ([facility]) E11.65, Z79.4    Severe bipolar II disorder, recent episode major depressive, remission ([facility]) F31.81    Bipolar 1 disorder ([facility]) F31.9    Leukocytosis D72.829    Lactic acidosis E87.2    Hyponatremia E87.1    Normocytic anemia D64.9    Obesity (BMI 30-39. 9) E66.9    History of noncompliance with medical treatment Z91.19    Essential hypertension I10    Tobacco dependence F17.200    Gastroesophageal reflux disease without esophagitis K21.9    History of marijuana use Z87.898    Physical debility R53.81    Anemia D64.9    Electrolyte imbalance E87.8    Type 2 diabetes mellitus with hyperglycemia, with long-term current use of insulin ([facility]) E11.65, Z79.4    Weakness R53.1    Infection of above knee amputation stump of right leg ([facility]) T87.43    Above knee amputation of right lower extremity ([facility]) X19.326U    Sepsis ([facility]) A41.9    Vitamin D deficiency E55.9    COPD exacerbation ([facility]) J44.1    Influenza B J10.1    Depression, recurrent ([facility]) F33.9    Major depressive disorder, recurrent ([facility]) F33.9    GI bleed K92.2    Elevated lipase R74.8    Other psychoactive substance abuse, uncomplicated ([facility]) M65.49    Calculus of gallbladder without cholecystitis without obstruction K80.20    Right upper quadrant abdominal pain R10.11    Pedal edema R60.0    MURALI (obstructive sleep apnea) G47.33    Shortness of breath R06.02    Hypothyroid E03.9    Anxiety and depression F41.9, F32. A    Dyspnea R06.00    Sinus tachycardia S77.8    Metabolic acidosis M38.9    Edema of left lower extremity R60.0    SOB (shortness of breath) on exertion R06.02    Intermittent palpitations R00.2    History of chest pain Z87.898    Dizziness, nonspecific R42    Hyperlipidemia LDL goal <70 E78.5    Neuropathy G62.9    Diabetic ulcer of left lower leg associated with type 2 diabetes mellitus ([facility]) E11.622, L97.929    Nonhealing ulcer of left lower extremity with fat layer exposed ([facility] [address] Q97.103    Cellulitis and abscess of left lower extremity L03.116, L02.416         ASSESSMENT/PLAN   Left lower leg nonhealing wound: We will continue compression therapy. We will arrange home health for Unna boot at home on discharge. Will ask wound and ostomy to apply Unna boot tomorrow before discharge.   We will plan home with oral antibiotic and follow-up at the wound clinic.       Judge Tee MD, MD, FACP 1/6/2022 12:53 PM

## 2022-01-06 NOTE — CARE COORDINATION
Discharge Planning Update Note: Planning home with friends. Dr Azalea Weir noted plan for discharge to home tomorrow. Current with [facility]:  Unna boot at home on discharge. Wound and ostomy to apply Unna boot tomorrow before discharge. Plan home with oral antibiotic and follow-up at the wound clinic.   He has needed DME including wheelchair.

## 2022-01-06 NOTE — CARE COORDINATION
1/6/22, 3:23 PM EST    DISCHARGE PLANNING EVALUATION     spoke with [facility]. [facility] has orders, had not been able to see patient before he was admitted. [facility] will follow at discharge.

## 2022-01-07 VITALS
TEMPERATURE: 98 F | HEART RATE: 75 BPM | DIASTOLIC BLOOD PRESSURE: 65 MMHG | RESPIRATION RATE: 16 BRPM | SYSTOLIC BLOOD PRESSURE: 154 MMHG | BODY MASS INDEX: 35.99 KG/M2 | OXYGEN SATURATION: 97 % | WEIGHT: 223 LBS

## 2022-01-07 LAB
GLUCOSE BLD-MCNC: 228 MG/DL (ref 70–108)
GLUCOSE BLD-MCNC: 253 MG/DL (ref 70–108)

## 2022-01-07 PROCEDURE — 6360000002 HC RX W HCPCS: Performed by: EMERGENCY MEDICINE

## 2022-01-07 PROCEDURE — 6370000000 HC RX 637 (ALT 250 FOR IP): Performed by: HOSPITALIST

## 2022-01-07 PROCEDURE — 99239 HOSP IP/OBS DSCHRG MGMT >30: CPT | Performed by: PHYSICIAN ASSISTANT

## 2022-01-07 PROCEDURE — 6360000002 HC RX W HCPCS: Performed by: HOSPITALIST

## 2022-01-07 PROCEDURE — 29580 STRAPPING UNNA BOOT: CPT

## 2022-01-07 PROCEDURE — 2580000003 HC RX 258: Performed by: EMERGENCY MEDICINE

## 2022-01-07 PROCEDURE — 6370000000 HC RX 637 (ALT 250 FOR IP): Performed by: PHYSICIAN ASSISTANT

## 2022-01-07 PROCEDURE — 82948 REAGENT STRIP/BLOOD GLUCOSE: CPT

## 2022-01-07 PROCEDURE — 2W1MX6Z COMPRESSION OF LEFT LOWER EXTREMITY USING PRESSURE DRESSING: ICD-10-PCS | Performed by: INTERNAL MEDICINE

## 2022-01-07 PROCEDURE — 2580000003 HC RX 258: Performed by: HOSPITALIST

## 2022-01-07 RX ADMIN — FENOFIBRATE 160 MG: 160 TABLET ORAL at 08:29

## 2022-01-07 RX ADMIN — LISINOPRIL 5 MG: 5 TABLET ORAL at 08:29

## 2022-01-07 RX ADMIN — PANTOPRAZOLE SODIUM 40 MG: 40 TABLET, DELAYED RELEASE ORAL at 06:07

## 2022-01-07 RX ADMIN — LEVOTHYROXINE SODIUM 50 MCG: 0.05 TABLET ORAL at 06:07

## 2022-01-07 RX ADMIN — SODIUM CHLORIDE 25 ML: 9 INJECTION, SOLUTION INTRAVENOUS at 04:56

## 2022-01-07 RX ADMIN — SODIUM CHLORIDE, PRESERVATIVE FREE 10 ML: 5 INJECTION INTRAVENOUS at 08:29

## 2022-01-07 RX ADMIN — MEROPENEM 1000 MG: 1 INJECTION, POWDER, FOR SOLUTION INTRAVENOUS at 04:59

## 2022-01-07 RX ADMIN — GABAPENTIN 300 MG: 300 CAPSULE ORAL at 08:30

## 2022-01-07 RX ADMIN — INSULIN LISPRO 6 UNITS: 100 INJECTION, SOLUTION INTRAVENOUS; SUBCUTANEOUS at 08:33

## 2022-01-07 RX ADMIN — INSULIN GLARGINE 75 UNITS: 100 INJECTION, SOLUTION SUBCUTANEOUS at 09:07

## 2022-01-07 RX ADMIN — ARIPIPRAZOLE 5 MG: 5 TABLET ORAL at 08:29

## 2022-01-07 RX ADMIN — Medication 1 TABLET: at 08:29

## 2022-01-07 RX ADMIN — METOPROLOL TARTRATE 50 MG: 50 TABLET ORAL at 08:29

## 2022-01-07 RX ADMIN — INSULIN LISPRO 4 UNITS: 100 INJECTION, SOLUTION INTRAVENOUS; SUBCUTANEOUS at 11:58

## 2022-01-07 RX ADMIN — ENOXAPARIN SODIUM 40 MG: 100 INJECTION SUBCUTANEOUS at 08:33

## 2022-01-07 ASSESSMENT — PAIN SCALES - GENERAL
PAINLEVEL_OUTOF10: 0
PAINLEVEL_OUTOF10: 0

## 2022-01-07 NOTE — CARE COORDINATION
DISCHARGE/PLANNING EVALUATION  1/7/22, 1:05 PM EST    Reason for Referral: home health   Mental Status: alert, oriented  Decision Making: makes own decisions  Family/Social/Home Environment:  Mahendra Hernandez lives at home with friends. His friends help with household tasks, patient uses public transportation. Current Services including food security, transportation and housekeeping:  Current with [facility]  Current Equipment: wheelchair  Payment Source: [employer]  Concerns or Barriers to Discharge:  None   Post acute provider list with quality measures, geographic area and applicable managed care information provided. Questions regarding selection process answered: declined, prefers [facility]    Teach Back Method used with patient regarding care plan and discharge plan  Patient  verbalizes understanding of the plan of care and contributes to goal setting. Patient goals, treatment preferences and discharge plan:  Spoke with Mahendra Hernandez about discharge plan. He plans home with friends, has transport home, current with [facility]. Notified [facility] of discharge.       Electronically signed by NEFTALI Harris on 1/7/2022 at 1:05 PM

## 2022-01-07 NOTE — DISCHARGE SUMMARY
Hospitalist Discharge Summary        Patient: Wendy Osei  YOB: 1970  MRN: [id number]   Acct: [de-identified]    Primary Care Physician: Keara Mcfadden MD    Admit date  1/4/2022    Discharge date: 1/7/2022  2:00 PM    Chief Complaint on presentation :-  Persistent nonhealing infected diabetic ulcer, failure of outpatient treatment    Discharge Assessment and Plan:-      1.  Nonhealing infected diabetic ulcer, failure of outpatient treatment: recent admission for infected ulcer. Patient was treated with IV Rocephin while hospitalized, and was discharged on Keflex. It appears that this patient's wound was not responding to above antibiotics. ID consulted for IV abx recs. Will discharge with PO abx per ID tomorrow with [facility] vigil. Gen Surg, Dr. Easton Abarca, was also consulted. 1/7: Discussed with ID, patient is able to continue his antibiotic that he has at home. Patient is set up with home health already. Chrissya boot applied before patient discharge. Patient will need to follow-up in the wound clinic with Dr. Zan Salas in 1 week. 2.  Insulin-dependent diabetes mellitus type 2: We will hold patient's home dosage of Jardiance, we will continue with Lantus and insulin sliding scale.  Patient's last A1c from 1/1/2022 was noted to be 7.1%.     3.  Essential Hypertension:  We will continue with patient's home medications of metoprolol and lisinopril.     4.  Depression: Patient normally is on sertraline.  This was held during his last hospitalization due to treatment with Zyvox as a precaution for potential interaction and serotonin syndrome.  We will again hold this agent in case patient may need to go back on Zyvox.  For now we will treat with ceftriaxone as noted above.     5.  Hypothyroidism: We will continue with patient's levothyroxine.     6.  Calf tenderness: Patient had recent Achilles tendon release in November. Sylvia Wise had a venous ultrasound on 12/3/2021, and again on 12/29/2021, both times were negative for DVT.     7.  DVT prophylaxis: Lovenox daily injection. Please see above for full details. Patient was admitted and treated with IV antibiotics. ID and general surgery were consult. Decision to move forward with conservative treatment with p.o. antibiotics and wound compression were determined. Patient will need follow-up with Paty Maki in 1 week at the wound clinic. Home health has been ordered for the patient Unna boot has been ordered for the patient. Patient feels comfortable returning home and all VSS and suitable for discharge.       Initial H and P and Hospital course:-  \"Jamie Spangler is a 46 y. o. male with PMHx of insulin-dependent diabetes mellitus type 2 with prior nonhealing infections leading to amputations, recently admitted here from 12/29/2021 until 1/1/2022 for management of left lower extremity infected ulcer, who presented to [address] [place] [address] again with failure of outpatient management.      During patient's last admission, he was seen both by Dodie Avila Argue that time, there were no areas of necrosis or abscess to drain, and patient was initially treated with Zyvox.  This was since changed to ceftriaxone.  As there was no recommendations for surgical intervention at that time, decision was made to discharge home with a trial of outpatient antibiotics.  He was discharged on Keflex.     Patient reports that he continue to experience subjective fever and chills, although he did not present with fever here. [facility] was concerned that his wound was not improving, possibly worsening, therefore came back to the ED as noted.     In the ED, patient had mild tachycardia, but was not febrile, and did not have leukocytosis.  He therefore did not meet sepsis criteria.  However, initial lactic acid level was 3.1, and subsequent lactic acid levels improved to 2.5.  X-ray of the left lower extremity demonstrated lucency at the lateral malleolus, no dislocation identified.     Patient was empirically started on IV antibiotics.  We were asked to admit him for further management. \"     Subjective (past 24 hours):   1/6: pt doing well, no new complaints. Denies any fever/chills. No CP/SOB. No plan for surgical intervention. Physical Exam:-  Vitals:   Patient Vitals for the past 24 hrs:   BP Temp Temp src Pulse Resp SpO2   01/07/22 0815 (!) 154/65 98 °F (36.7 °C) Oral 75 16 97 %   01/07/22 0330 (!) 108/47 97.9 °F (36.6 °C) Oral 76 18 93 %   01/06/22 2000 (!) 152/70 97.8 °F (36.6 °C) Oral 72 18 97 %   01/06/22 1645 134/61 97.7 °F (36.5 °C) Oral 71 18 96 %   01/06/22 1115 (!) 125/59 97.6 °F (36.4 °C) Oral 72 18 100 %     Weight:   Weight: 223 lb (101.2 kg)   24 hour intake/output:     Intake/Output Summary (Last 24 hours) at 1/7/2022 0919  Last data filed at 1/7/2022 0330  Gross per 24 hour   Intake 2799 ml   Output 1875 ml   Net 924 ml       General appearance: obese, no apparent distress, appears stated age and cooperative. HEENT: Pupils equal, round, and reactive to light. Conjunctivae/corneas clear. Neck: Supple, with full range of motion. No jugular venous distention. Trachea midline. Respiratory:  Normal respiratory effort. Clear to auscultation, bilaterally without Rales/Wheezes/Rhonchi. Cardiovascular: Regular rate and rhythm with normal S1/S2 without murmurs, rubs or gallops. Abdomen: Soft, non-tender, non-distended with normal bowel sounds. Musculoskeletal: R AKA, LLE wound wrapped (not visualized)  Skin: Skin color, texture, turgor normal.  No rashes or lesions. Neurologic:  Neurovascularly intact without any focal sensory/motor deficits.  Cranial nerves: II-XII intact, grossly non-focal.  Psychiatric: Alert and oriented, thought content appropriate, normal insight  Capillary Refill: Brisk,< 3 seconds   Peripheral Pulses: +2 palpable, equal bilaterally     Discharge Medications:-      Medication List      ASK your doctor about these medications    ARIPiprazole 5 MG tablet  Commonly known as: ABILIFY     atorvastatin 40 MG tablet  Commonly known as: LIPITOR  Take 1 tablet by mouth nightly     CALCIUM/D3 ADULT GUMMIES PO     cephALEXin 500 MG capsule  Commonly known as: KEFLEX  Take 1 capsule by mouth 4 times daily for 7 days     Dexcom G6  Lelia  1 Device by Does not apply route 4 times daily (before meals and nightly)     Dexcom G6 Sensor Misc  1 Device by Does not apply route every 14 days     Dexcom G6 Transmitter Misc  1 Device by Does not apply route every 3 months     fenofibrate 145 MG tablet  Commonly known as: TRICOR  Take 1 tablet by mouth daily     gabapentin 300 MG capsule  Commonly known as: NEURONTIN  TAKE 1 CAPSULE BY MOUTH THREE TIMES DAILY. Insulin Glargine (2 Unit Dial) 300 UNIT/ML Sopn  Change to 75 units BID and every 10 days increase by 5 units both times, to get am sugars 150-200     insulin lispro 200 UNIT/ML Sopn pen  Commonly known as: HUMALOG KWIKPEN U-200  40 units Plus scale - Max dose 160 units per day     Jardiance 25 MG tablet  Generic drug: empagliflozin  Take 1 tablet by mouth daily New dose     levothyroxine 50 MCG tablet  Commonly known as: SYNTHROID  Take 1 tablet by mouth daily     lisinopril 5 MG tablet  Commonly known as: PRINIVIL;ZESTRIL  Take 1 tablet by mouth daily     metoprolol tartrate 50 MG tablet  Commonly known as: Lopressor  Take 1 tablet by mouth 2 times daily     nitroGLYCERIN 0.3 MG SL tablet  Commonly known as: Nitrostat  Place 1 tablet under the tongue every 5 minutes as needed for Chest pain up to max of 3 total doses. If no relief after 1 dose, call 911.      OmniPod Dash 5 Pack Pods Misc  CHANGE PODS EVERY 48 HOURS AS DIRECTED     Pen Needles 31G X 8 MM Misc  For use with insulin injections five timed per day dx:E11.9     sertraline 50 MG tablet  Commonly known as: ZOLOFT     therapeutic multivitamin-minerals tablet             Labs :-  Recent Results (from the past 72 hour(s))   CBC auto differential Collection Time: 01/04/22 10:42 PM   Result Value Ref Range    WBC 10.2 4.8 - 10.8 thou/mm3    RBC 4.45 (L) 4.70 - 6.10 mill/mm3    Hemoglobin 14.2 14.0 - 18.0 gm/dl    Hematocrit 40.2 (L) 42.0 - 52.0 %    MCV 90.3 80.0 - 94.0 fL    MCH 31.9 26.0 - 33.0 pg    MCHC 35.3 32.2 - 35.5 gm/dl    RDW-CV 14.5 11.5 - 14.5 %    RDW-SD 47.5 (H) 35.0 - 45.0 fL    Platelets 459 422 - 798 thou/mm3    MPV 9.2 (L) 9.4 - 12.4 fL    Seg Neutrophils 60.9 %    Lymphocytes 24.7 %    Monocytes 7.8 %    Eosinophils 5.2 %    Basophils 0.8 %    Immature Granulocytes 0.6 %    Segs Absolute 6.2 1.8 - 7.7 thou/mm3    Lymphocytes Absolute 2.5 1.0 - 4.8 thou/mm3    Monocytes Absolute 0.8 0.4 - 1.3 thou/mm3    Eosinophils Absolute 0.5 (H) 0.0 - 0.4 thou/mm3    Basophils Absolute 0.1 0.0 - 0.1 thou/mm3    Immature Grans (Abs) 0.06 0.00 - 0.07 thou/mm3    nRBC 0 /100 wbc   Basic Metabolic Panel    Collection Time: 01/04/22 10:42 PM   Result Value Ref Range    Sodium 137 135 - 145 meq/L    Potassium 4.1 3.5 - 5.2 meq/L    Chloride 99 98 - 111 meq/L    CO2 22 (L) 23 - 33 meq/L    Glucose 273 (H) 70 - 108 mg/dL    BUN 20 7 - 22 mg/dL    CREATININE 0.9 0.4 - 1.2 mg/dL    Calcium 9.6 8.5 - 10.5 mg/dL   Hepatic function panel    Collection Time: 01/04/22 10:42 PM   Result Value Ref Range    Albumin 3.9 3.5 - 5.1 g/dL    Total Bilirubin 0.3 0.3 - 1.2 mg/dL    Bilirubin, Direct <0.2 0.0 - 0.3 mg/dL    Alkaline Phosphatase 66 38 - 126 U/L    AST 27 5 - 40 U/L    ALT 22 11 - 66 U/L    Total Protein 7.4 6.1 - 8.0 g/dL   Lipase    Collection Time: 01/04/22 10:42 PM   Result Value Ref Range    Lipase 17.1 5.6 - 51.3 U/L   Magnesium    Collection Time: 01/04/22 10:42 PM   Result Value Ref Range    Magnesium 2.0 1.6 - 2.4 mg/dL   Procalcitonin    Collection Time: 01/04/22 10:42 PM   Result Value Ref Range    Procalcitonin 0.07 0.01 - 0.09 ng/mL   Anion Gap    Collection Time: 01/04/22 10:42 PM   Result Value Ref Range    Anion Gap 16.0 8.0 - 16.0 meq/L Osmolality    Collection Time: 01/04/22 10:42 PM   Result Value Ref Range    Osmolality Calc 286.1 275.0 - 300.0 mOsmol/kg   Glomerular Filtration Rate, Estimated    Collection Time: 01/04/22 10:42 PM   Result Value Ref Range    Est, Glom Filt Rate 88 (A) ml/min/1.73m2   Culture, Blood 1    Collection Time: 01/04/22 10:45 PM    Specimen: Blood   Result Value Ref Range    Blood Culture, Routine No growth-preliminary     COVID-19, Rapid    Collection Time: 01/04/22 10:50 PM    Specimen: Nasopharyngeal Swab   Result Value Ref Range    SARS-CoV-2, NAAT NOT  DETECTED NOT DETECTED   Culture, Anaerobic and Aerobic    Collection Time: 01/04/22 11:15 PM    Specimen: Wound   Result Value Ref Range    Aerobic Culture       No growth-preliminary Culture yielded light growth of Staphylococcus species (coagulase negative) and gram positive bacilli most consistent with Bacillus species. Clinical correlation required. Gram Stain Result       Few segmented neutrophils observed. Rare epithelial cells observed. No organisms observed.     Culture, Blood 2    Collection Time: 01/04/22 11:18 PM    Specimen: Blood   Result Value Ref Range    Blood Culture, Routine No growth-preliminary     Lactate, Sepsis    Collection Time: 01/04/22 11:18 PM   Result Value Ref Range    Lactic Acid, Sepsis 3.1 (H) 0.5 - 1.9 mmol/L   Lactate, Sepsis    Collection Time: 01/05/22  1:06 AM   Result Value Ref Range    Lactic Acid, Sepsis 2.5 (H) 0.5 - 1.9 mmol/L   POCT Glucose    Collection Time: 01/05/22  4:23 PM   Result Value Ref Range    POC Glucose 320 (H) 70 - 108 mg/dl   Lactic Acid, Plasma    Collection Time: 01/05/22  4:25 PM   Result Value Ref Range    Lactic Acid 2.3 (H) 0.5 - 2.0 mmol/L   Basic Metabolic Panel w/ Reflex to MG    Collection Time: 01/06/22  4:54 AM   Result Value Ref Range    Sodium 137 135 - 145 meq/L    Potassium reflex Magnesium 4.4 3.5 - 5.2 meq/L    Chloride 105 98 - 111 meq/L    CO2 20 (L) 23 - 33 meq/L    Glucose 158 (H) 70 - 108 mg/dL    BUN 25 (H) 7 - 22 mg/dL    CREATININE 1.0 0.4 - 1.2 mg/dL    Calcium 8.8 8.5 - 10.5 mg/dL   CBC    Collection Time: 01/06/22  4:54 AM   Result Value Ref Range    WBC 10.5 4.8 - 10.8 thou/mm3    RBC 4.19 (L) 4.70 - 6.10 mill/mm3    Hemoglobin 13.0 (L) 14.0 - 18.0 gm/dl    Hematocrit 39.3 (L) 42.0 - 52.0 %    MCV 93.8 80.0 - 94.0 fL    MCH 31.0 26.0 - 33.0 pg    MCHC 33.1 32.2 - 35.5 gm/dl    RDW-CV 14.6 (H) 11.5 - 14.5 %    RDW-SD 50.4 (H) 35.0 - 45.0 fL    Platelets 816 475 - 141 thou/mm3    MPV 9.4 9.4 - 12.4 fL   Anion Gap    Collection Time: 01/06/22  4:54 AM   Result Value Ref Range    Anion Gap 12.0 8.0 - 16.0 meq/L   Glomerular Filtration Rate, Estimated    Collection Time: 01/06/22  4:54 AM   Result Value Ref Range    Est, Glom Filt Rate 78 (A) ml/min/1.73m2   POCT Glucose    Collection Time: 01/06/22  8:25 PM   Result Value Ref Range    POC Glucose 194 (H) 70 - 108 mg/dl   POCT Glucose    Collection Time: 01/07/22  6:13 AM   Result Value Ref Range    POC Glucose 253 (H) 70 - 108 mg/dl        Microbiology:    Blood culture #1:   Lab Results   Component Value Date    BC No growth-preliminary  01/04/2022       Blood culture #2:No results found for: BLOODCULT2    Organism:    Lab Results   Component Value Date    LABGRAM  01/04/2022     Few segmented neutrophils observed. Rare epithelial cells observed. No organisms observed. MRSA culture only:No results found for: [facility]    Urine culture: No results found for: Christina Trevino  Lab Results   Component Value Date    ORG Staphylococcus aureus 11/30/2020        Respiratory culture: No results found for: CULTRESP    Aerobic and Anaerobic :  Lab Results   Component Value Date    LABAERO  01/04/2022     No growth-preliminary Culture yielded light growth of Staphylococcus species (coagulase negative) and gram positive bacilli most consistent with Bacillus species. Clinical correlation required.       Lab Results   Component Value Date    MAGDALENA 11/30/2020     No growth-preliminary No anaerobes isolated- preliminary No anaerobes isolated        Urinalysis:      Lab Results   Component Value Date    NITRU NEGATIVE 12/30/2021    WBCUA 0-2 12/30/2021    BACTERIA NONE SEEN 12/30/2021    RBCUA 0-2 12/30/2021    BLOODU NEGATIVE 12/30/2021    SPECGRAV 1.025 07/27/2021    GLUCOSEU >= 1000 12/30/2021       Radiology:-  XR TIBIA FIBULA LEFT (2 VIEWS)    Result Date: 1/4/2022  PROCEDURE: XR TIBIA FIBULA LEFT (2 VIEWS) CLINICAL INFORMATION: Cellulitis and wound on left foreleg TECHNIQUE: 4 views of the left tibia and fibula COMPARISON: Left tibia and fibula 12/29/2021 FINDINGS: There is suggestion of a lucency at the lateral malleolus. However, this area is poorly visualized on the submitted images. No dislocation is identified. An osteophyte is present at the plantar surface of the calcaneus. Tendinous calcifications  are noted at the posterior calcaneus. Poorly visualized lucency at the lateral malleolus suspicious for nondisplaced fracture.  Final report electronically signed by Dr. Tahira Cleary on 1/4/2022 11:06 PM       Follow-up scheduled after discharge :-    in the next few days with Rojelio Briggs MD   In 1 week with Soha Covington at the wound clinic    Consultations during this hospital stay:-  [] NONE [] Cardiology  [] Nephrology  [] Hemo onco   [] GI   [x] ID  [] Endocrine  [] Pulm    [] Neuro    [] Psych   [] Urology  [] ENT   [x] G SURGERY   []Ortho    []CV surg    [] Palliative  [] Hospice [] Pain management   []    []TCU   [] PT/OT   OTHERS:-    Disposition: home  Condition at Discharge: Stable    Time Spent:- 45 minutes    Electronically signed by Jose Alfredo Ngo PA-C on 1/7/22 at 9:19 AM EST   Discharging Hospitalist

## 2022-01-07 NOTE — DISCHARGE INSTR - ACTIVITY
Up in chair as tolerated  Keep unna boot on at all times. Home health will change it.    Change position frequently in chair to prevent pressure sore to buttocks

## 2022-01-07 NOTE — PROGRESS NOTES
Pt given discharge instructions with no visitors at pt side. Pt states his ride is waiting on him in the discharge [place]. No rx given. Follow up advised. All personal belongings sent with pt. To car via personal wheelchair.

## 2022-01-07 NOTE — CARE COORDINATION
1/7/22, 12:25 PM EST    Patient goals/plan/ treatment preferences discussed by  and . Patient goals/plan/ treatment preferences reviewed with patient/ family. Patient/ family verbalize understanding of discharge plan and are in agreement with goal/plan/treatment preferences. Understanding was demonstrated using the teach back method. AVS provided by RN at time of discharge, which includes all necessary medical information pertaining to the patients current course of illness, treatment, post-discharge goals of care, and treatment preferences. Services After Discharge  Services At/After Discharge: PT,OT,Nursing Services ([address] [place] [address])   IMM Letter  IMM Letter given to Patient/Family/Significant other/Guardian/POA/by[de-identified] intake  IMM Letter date given[de-identified] 01/05/22  IMM Letter time given[de-identified] 2462     Planning home today with friends. Unnaboot application ordered prior to discharge. Current with [facility].

## 2022-01-07 NOTE — CARE COORDINATION
1/7/22, 1:16 PM EST    Patient goals/plan/ treatment preferences discussed by  and . Patient goals/plan/ treatment preferences reviewed with patient/ family. Patient/ family verbalize understanding of discharge plan and are in agreement with goal/plan/treatment preferences. Understanding was demonstrated using the teach back method. AVS provided by RN at time of discharge, which includes all necessary medical information pertaining to the patients current course of illness, treatment, post-discharge goals of care, and treatment preferences.     Services After Discharge  Services At/After Discharge: PT,OT,Nursing Services ([address] [place] [address] [address])   IMM Letter  IMM Letter given to Patient/Family/Significant other/Guardian/POA/by[de-identified] intake  IMM Letter date given[de-identified] 01/05/22  IMM Letter time given[de-identified] 9168

## 2022-01-07 NOTE — DISCHARGE INSTR - DIET

## 2022-01-07 NOTE — PROGRESS NOTES
Progress note: Infectious diseases    Patient - Mike Pineda,  Age - 46 y.o.    - 1970      Room Number - 7K-22/022-A   MRN -  [id number]   Acct # - [de-identified]  Date of Admission -  2022  9:51 PM    SUBJECTIVE:   No new issues. OBJECTIVE   VITALS    weight is 223 lb (101.2 kg). His oral temperature is 98 °F (36.7 °C). His blood pressure is 154/65 (abnormal) and his pulse is 75. His respiration is 16 and oxygen saturation is 97%. Wt Readings from Last 3 Encounters:   22 223 lb (101.2 kg)   21 223 lb (101.2 kg)   21 223 lb (101.2 kg)       I/O (24 Hours)    Intake/Output Summary (Last 24 hours) at 2022 1007  Last data filed at 2022 0330  Gross per 24 hour   Intake 2799 ml   Output 1875 ml   Net 924 ml       General Appearance  Awake, alert, oriented,  not  In acute distress  HEENT - normocephalic, atraumatic, pink conjunctiva,  anicteric sclera  Neck - Supple, no mass. Lungs -  Bilateral good air entry, no rhonchi, no wheeze. Cardiovascular - Heart sounds are normal.     Abdomen - soft, not distended, nontender,   Neurologic -oriented  Skin - No bruising or bleeding  Extremities -right above-knee amputation.      The left lower leg is less swollen, no redness,clean open wound on the shin    MEDICATIONS:      insulin lispro  0-12 Units SubCUTAneous TID WC    insulin lispro  0-6 Units SubCUTAneous Nightly    sodium chloride flush  5-40 mL IntraVENous 2 times per day    enoxaparin  40 mg SubCUTAneous Daily    ARIPiprazole  5 mg Oral Daily    atorvastatin  40 mg Oral Nightly    fenofibrate  160 mg Oral Daily    gabapentin  300 mg Oral TID    levothyroxine  50 mcg Oral Daily    lisinopril  5 mg Oral Daily    metoprolol tartrate  50 mg Oral BID    multivitamin  1 tablet Oral Daily    insulin glargine  75 Units SubCUTAneous BID    pantoprazole  40 mg Oral QAM AC    meropenem  1,000 mg IntraVENous Q8H      sodium chloride 25 mL (01/07/22 0456)    sodium chloride Stopped (01/06/22 1825)     sodium chloride flush, sodium chloride, ondansetron **OR** ondansetron, polyethylene glycol, acetaminophen **OR** acetaminophen, nitroGLYCERIN      LABS:     CBC:   Recent Labs     01/04/22 2242 01/06/22 0454   WBC 10.2 10.5   HGB 14.2 13.0*    218     BMP:    Recent Labs     01/04/22 2242 01/06/22 0454    137   K 4.1 4.4   CL 99 105   CO2 22* 20*   BUN 20 25*   CREATININE 0.9 1.0   GLUCOSE 273* 158*     Calcium:  Recent Labs     01/06/22 0454   CALCIUM 8.8     Ionized Calcium:No results for input(s): IONCA in the last 72 hours. Magnesium:  Recent Labs     01/04/22 2242   MG 2.0     Phosphorus:No results for input(s): PHOS in the last 72 hours. BNP:No results for input(s): BNP in the last 72 hours. Glucose:  Recent Labs     01/05/22  1623 01/06/22 2025 01/07/22  0613   POCGLU 320* 194* 253*     HgbA1C: No results for input(s): LABA1C in the last 72 hours. INR: No results for input(s): INR in the last 72 hours. Hepatic:   Recent Labs     01/04/22 2242   ALKPHOS 66   ALT 22   AST 27   PROT 7.4   BILITOT 0.3   BILIDIR <0.2   LABALBU 3.9     Amylase and Lipase:  Recent Labs     01/05/22  1625   LACTA 2.3*     Lactic Acid:   Recent Labs     01/05/22  1625   LACTA 2.3*       Problem list of patient:     Patient Active Problem List   Diagnosis Code    Status post below knee amputation of right lower extremity ([facility] [address] Z89.511    Gait disturbance R26.9    Sebaceous cyst L72.3    Uncontrolled type 2 diabetes mellitus with hyperglycemia, with long-term current use of insulin ([facility]) E11.65, Z79.4    Severe bipolar II disorder, recent episode major depressive, remission ([facility]) F31.81    Bipolar 1 disorder ([facility]) F31.9    Leukocytosis D72.829    Lactic acidosis E87.2    Hyponatremia E87.1    Normocytic anemia D64.9    Obesity (BMI 30-39. 9) E66.9    History of noncompliance with medical treatment Z91.19    Essential hypertension I10    Tobacco dependence F17.200    Gastroesophageal reflux disease without esophagitis K21.9    History of marijuana use Z87.898    Physical debility R53.81    Anemia D64.9    Electrolyte imbalance E87.8    Type 2 diabetes mellitus with hyperglycemia, with long-term current use of insulin ([facility]) E11.65, Z79.4    Weakness R53.1    Infection of above knee amputation stump of right leg ([facility]) T87.43    Above knee amputation of right lower extremity ([facility]) Y83.184R    Sepsis ([facility]) A41.9    Vitamin D deficiency E55.9    COPD exacerbation ([facility]) J44.1    Influenza B J10.1    Depression, recurrent ([facility]) F33.9    Major depressive disorder, recurrent ([facility]) F33.9    GI bleed K92.2    Elevated lipase R74.8    Other psychoactive substance abuse, uncomplicated ([facility]) F58.20    Calculus of gallbladder without cholecystitis without obstruction K80.20    Right upper quadrant abdominal pain R10.11    Pedal edema R60.0    MURALI (obstructive sleep apnea) G47.33    Shortness of breath R06.02    Hypothyroid E03.9    Anxiety and depression F41.9, F32. A    Dyspnea R06.00    Sinus tachycardia A24.4    Metabolic acidosis W80.4    Edema of left lower extremity R60.0    SOB (shortness of breath) on exertion R06.02    Intermittent palpitations R00.2    History of chest pain Z87.898    Dizziness, nonspecific R42    Hyperlipidemia LDL goal <70 E78.5    Neuropathy G62.9    Diabetic ulcer of left lower leg associated with type 2 diabetes mellitus ([facility]) E11.622, L97.929    Nonhealing ulcer of left lower extremity with fat layer exposed ([facility] [address] D89.384    Cellulitis and abscess of left lower extremity L03.116, L02.416         ASSESSMENT/PLAN   Left lower leg nonhealing wound: will apply unaboot before discharge. Home health to change twice a  Wk.  He has already established home health  Stop iv antibioic  Finish oral antibiotic previously prescribed.   Follow up at the wound clinic in one wk with CNP ( I am not on his network )  [id number] Myrna Taveras with discharge    Sarath Burnette MD, MD, Michell Brody 1/7/2022 10:07 AM

## 2022-01-07 NOTE — PROGRESS NOTES
Present to pt room for application of unna boot prior to d/c per Dr. Toy Knott. Pt sitting in wheel chair. Pt noted to have kerlix to LLE. Removed old dressing. Pt has non healing lower leg wound to left leg. Cleansed wound with normal saline and gauze. Pat dry with clean gauze. Applied opticell ag to open area. Pt post tib and dorsal pedal pulses intact, cap refill less than 3 seconds. Apply unna boot then coban to left lower leg from base of toes to 1-2 inches below bend of knee. Pt to follow up in wound clinic in 1 week per Dr. Toy Knott.

## 2022-01-09 LAB
AEROBIC CULTURE: NORMAL
ANAEROBIC CULTURE: NORMAL
GRAM STAIN RESULT: NORMAL

## 2022-01-10 ENCOUNTER — CARE COORDINATION (OUTPATIENT)
Dept: CASE MANAGEMENT | Age: 52
End: 2022-01-10

## 2022-01-10 LAB
BLOOD CULTURE, ROUTINE: NORMAL
BLOOD CULTURE, ROUTINE: NORMAL

## 2022-01-10 NOTE — CARE COORDINATION
Care Transitions Outreach Attempt    Call within 2 business days of discharge: Yes   Attempted to reach patient for transitions of care follow up. Unable to reach patient. LM w/CTN # to return call. Patient: Cristina Barker Patient : 1970 MRN: [id number]    Last Discharge [facility]       Complaint Diagnosis Description Type Department Provider    22 Leg Swelling Cellulitis and abscess of left lower extremity . .. ED to Hosp-Admission (Discharged) (ADMITTED) BLANK Velasco PA-C; [employer]. ..             Noted following upcoming appointments from discharge chart review:   [facility] follow up appointment(s):   Future Appointments   Date Time Provider Ebenezer Carmichael   2022  3:00  [facility], [address] [facility]   2022 11:00 AM Melida Chavarria MD [facility] [address]   2022 12:45 PM MD JULIAN Howe [address]   2022  3:30 PM Leonela Joseph RD, LD [facility] [address]   3/22/2022 11:00 AM NOELLE Nina [facility] [address]     [id number] Myrna Taveras follow up appointment(s):

## 2022-01-11 ENCOUNTER — CARE COORDINATION (OUTPATIENT)
Dept: CASE MANAGEMENT | Age: 52
End: 2022-01-11

## 2022-01-11 DIAGNOSIS — E11.622 DIABETIC ULCER OF LEFT LOWER LEG ASSOCIATED WITH TYPE 2 DIABETES MELLITUS (HCC): Primary | ICD-10-CM

## 2022-01-11 DIAGNOSIS — L97.929 DIABETIC ULCER OF LEFT LOWER LEG ASSOCIATED WITH TYPE 2 DIABETES MELLITUS (HCC): Primary | ICD-10-CM

## 2022-01-11 PROCEDURE — 1111F DSCHRG MED/CURRENT MED MERGE: CPT | Performed by: INTERNAL MEDICINE

## 2022-01-11 NOTE — CARE COORDINATION
Lanie 45 Transitions Initial Follow Up Call    Call within 2 business days of discharge: Yes    Patient: Felicita Abdullahi Patient : 1970   MRN: [id number]  Reason for Admission: L leg swelling    Discharge Date: 22 RARS: Readmission Risk Score: 20.4 ( )      Last Discharge [facility]       Complaint Diagnosis Description Type Department Provider    22 Leg Swelling Cellulitis and abscess of left lower extremity . .. ED to Hosp-Admission (Discharged) (ADMITTED) BLANK Briggs PA-C; Clifford Beltran. .. Spoke with: Yumiko Moreira today and he said he is doing pretty good. Wearing the Unna boot L leg. [facility] saw him yesterday and he has an appt. With ID tonorrow and he has transportation. PCP appt. 22. Meds reviewed and are correct. He c/o diarrhea this morning and sent PCP a [employer] message re: this. FBS= 206-209. Denies fever/sob/n/v. He is eating,drinking and sleeping ok. Denies problems with urination. No other concerns voiced at this time. CTN # given to pt.  Rodri Farah RN Care Transitions [phone]        Non-face-to-face services provided:  Scheduled appointment with PCP-22  Scheduled appointment with Specialist-ID 22    Care Transitions 24 Hour Call    Do you have any ongoing symptoms?: Yes  Patient-reported symptoms: Other  Interventions for patient-reported symptoms: Other  Do you have a copy of your discharge instructions?: Yes  Do you have all of your prescriptions and are they filled?: Yes  Have you been contacted by a [facility] Pharmacist?: No  Have you scheduled your follow up appointment?: Yes  How are you going to get to your appointment?: Car - family or friend to transport  Were you discharged with any Home Care or Post Acute Services: Yes  Post Acute Services: Home Health (Comment: [facility])  Patient DME: Shower chair, Walker, Wheelchair, Other  Other Patient DME: Entrance Ramp  Do you have support at home?: Other Caregiver  Do you feel like you have everything you need to keep you well at home?: Yes  Are you an active caregiver in your home?: No  Care Transitions Interventions  No Identified Needs       Transitions of Care Initial Call      Challenges to be reviewed by the provider   Additional needs identified to be addressed with provider: No  none             Method of communication with provider : none      Advance Care Planning:   Does patient have an Advance Directive: on file. Was this a readmission? No  Patient stated reason for admission: L leg swelling  Patients top risk factors for readmission: functional physical ability  lack of knowledge about disease  medical condition-L leg cellulitis, DM, R AKA, HTN  multiple health system providers  polypharmacy    Care Transition Nurse (CTN) contacted the patient by telephone to perform post hospital discharge assessment. Verified name and  with patient as identifiers. Provided introduction to self, and explanation of the CTN role. CTN reviewed discharge instructions, medical action plan and red flags with patient who verbalized understanding. Patient given an opportunity to ask questions and does not have any further questions or concerns at this time. Were discharge instructions available to patient? Yes. Reviewed appropriate site of care based on symptoms and resources available to patient including: PCP, Specialist and Home health. The patient agrees to contact the PCP office for questions related to their healthcare. Medication reconciliation was performed with patient, who verbalizes understanding of administration of home medications. Advised obtaining a 90-day supply of all daily and as-needed medications. Covid Risk Education     Educated patient about risk for severe COVID-19 due to risk factors according to CDC guidelines. CTN reviewed discharge instructions, medical action plan and red flag symptoms with the patient who verbalized understanding.  Discussed COVID vaccination status: No. Education provided on COVID-19 vaccination as appropriate. Discussed exposure protocols and quarantine with CDC Guidelines. Patient was given an opportunity to verbalize any questions and concerns and agrees to contact CTN or health care provider for questions related to their healthcare. Reviewed and educated patient on any new and changed medications related to discharge diagnosis. Was patient discharged with a pulse oximeter? No Discussed and confirmed pulse oximeter discharge instructions and when to notify provider or seek emergency care. CTN provided contact information. Plan for follow-up call in 5-7 days based on severity of symptoms and risk factors.   Plan for next call: symptom management-L leg swelling, fever, FBS      Follow Up  Future Appointments   Date Time Provider Ebenezre Carmichael   1/12/2022  3:00  [facility], [address]   1/18/2022 11:00 AM Celina Caballero MD [facility] [place]   2/14/2022 12:45 PM MD JULIAN Goss [facility] [place]   2/21/2022  3:30 PM Isadora Lay RD, LD [facility] [place]   3/22/2022 11:00 AM Tip Rosenthal [place]

## 2022-01-12 ENCOUNTER — HOSPITAL ENCOUNTER (OUTPATIENT)
Dept: WOUND CARE | Age: 52
Discharge: HOME | End: 2022-01-12
Payer: MEDICARE

## 2022-01-12 ENCOUNTER — TELEPHONE (OUTPATIENT)
Dept: INTERNAL MEDICINE CLINIC | Age: 52
End: 2022-01-12

## 2022-01-12 VITALS
TEMPERATURE: 98 F | OXYGEN SATURATION: 96 % | SYSTOLIC BLOOD PRESSURE: 150 MMHG | DIASTOLIC BLOOD PRESSURE: 67 MMHG | RESPIRATION RATE: 18 BRPM | HEART RATE: 93 BPM

## 2022-01-12 DIAGNOSIS — Z92.29 ANTIBIOTIC TREATMENT WITHIN PAST 2 MONTHS: ICD-10-CM

## 2022-01-12 DIAGNOSIS — R19.7 ACUTE DIARRHEA: ICD-10-CM

## 2022-01-12 DIAGNOSIS — T50.905S ADVERSE EFFECT OF DRUG, SEQUELA: ICD-10-CM

## 2022-01-12 DIAGNOSIS — R19.7 DIARRHEA OF PRESUMED INFECTIOUS ORIGIN: ICD-10-CM

## 2022-01-12 DIAGNOSIS — R19.7 DIARRHEA, UNSPECIFIED TYPE: Primary | ICD-10-CM

## 2022-01-12 PROBLEM — L97.922 NONHEALING ULCER OF LEFT LOWER EXTREMITY WITH FAT LAYER EXPOSED (HCC): Status: RESOLVED | Noted: 2022-01-05 | Resolved: 2022-01-12

## 2022-01-12 PROBLEM — R60.0 VENOUS STASIS ULCER OF LEFT LOWER LEG WITH EDEMA OF LEFT LOWER LEG (HCC): Status: ACTIVE | Noted: 2021-12-29

## 2022-01-12 PROBLEM — I83.892 VENOUS STASIS ULCER OF LEFT LOWER LEG WITH EDEMA OF LEFT LOWER LEG (HCC): Status: ACTIVE | Noted: 2021-12-29

## 2022-01-12 PROBLEM — R60.9 VENOUS STASIS ULCER OF LEFT LOWER LEG WITH EDEMA OF LEFT LOWER LEG (HCC): Status: ACTIVE | Noted: 2021-12-29

## 2022-01-12 PROBLEM — I83.029 VENOUS STASIS ULCER OF LEFT LOWER LEG WITH EDEMA OF LEFT LOWER LEG (HCC): Status: ACTIVE | Noted: 2021-12-29

## 2022-01-12 PROCEDURE — 99213 OFFICE O/P EST LOW 20 MIN: CPT | Performed by: NURSE PRACTITIONER

## 2022-01-12 PROCEDURE — 99213 OFFICE O/P EST LOW 20 MIN: CPT

## 2022-01-12 RX ORDER — BETAMETHASONE DIPROPIONATE 0.05 %
OINTMENT (GRAM) TOPICAL ONCE
Status: CANCELLED | OUTPATIENT
Start: 2022-01-12 | End: 2022-01-12

## 2022-01-12 RX ORDER — LIDOCAINE 40 MG/G
CREAM TOPICAL ONCE
Status: CANCELLED | OUTPATIENT
Start: 2022-01-12 | End: 2022-01-12

## 2022-01-12 RX ORDER — LIDOCAINE HYDROCHLORIDE 20 MG/ML
JELLY TOPICAL ONCE
Status: CANCELLED | OUTPATIENT
Start: 2022-01-12 | End: 2022-01-12

## 2022-01-12 RX ORDER — PEN NEEDLE, DIABETIC 30 GX3/16"
NEEDLE, DISPOSABLE MISCELLANEOUS
Qty: 150 EACH | Refills: 1 | Status: SHIPPED | OUTPATIENT
Start: 2022-01-12 | End: 2022-03-21

## 2022-01-12 RX ORDER — GENTAMICIN SULFATE 1 MG/G
OINTMENT TOPICAL ONCE
Status: CANCELLED | OUTPATIENT
Start: 2022-01-12 | End: 2022-01-12

## 2022-01-12 RX ORDER — LIDOCAINE HYDROCHLORIDE 40 MG/ML
SOLUTION TOPICAL ONCE
Status: CANCELLED | OUTPATIENT
Start: 2022-01-12 | End: 2022-01-12

## 2022-01-12 RX ORDER — BACITRACIN, NEOMYCIN, POLYMYXIN B 400; 3.5; 5 [USP'U]/G; MG/G; [USP'U]/G
OINTMENT TOPICAL ONCE
Status: CANCELLED | OUTPATIENT
Start: 2022-01-12 | End: 2022-01-12

## 2022-01-12 RX ORDER — GINSENG 100 MG
CAPSULE ORAL ONCE
Status: CANCELLED | OUTPATIENT
Start: 2022-01-12 | End: 2022-01-12

## 2022-01-12 RX ORDER — CLOBETASOL PROPIONATE 0.5 MG/G
OINTMENT TOPICAL ONCE
Status: CANCELLED | OUTPATIENT
Start: 2022-01-12 | End: 2022-01-12

## 2022-01-12 RX ORDER — BACITRACIN ZINC AND POLYMYXIN B SULFATE 500; 1000 [USP'U]/G; [USP'U]/G
OINTMENT TOPICAL ONCE
Status: CANCELLED | OUTPATIENT
Start: 2022-01-12 | End: 2022-01-12

## 2022-01-12 RX ORDER — LIDOCAINE 50 MG/G
OINTMENT TOPICAL ONCE
Status: CANCELLED | OUTPATIENT
Start: 2022-01-12 | End: 2022-01-12

## 2022-01-12 NOTE — PROGRESS NOTES
[address] [place] [address] and Procedure Note      [facility] RECORD NUMBER:  [id number]  AGE: 46 y.o. GENDER: male  : 1970  EPISODE DATE:  2022    SUBJECTIVE:     Chief Complaint   Patient presents with    Wound Check     left leg         HISTORY OF PRESENT ILLNESS      Carrie Morin is a 46 y.o. male who presents today for evaluation of left lower extremity wound. Patient has been evaluated in hospital on two occasions for this wound. Was discharged home 22 with orders for alginate and unna boot changed twice weekly per home health. Today, patient states that he is not able to tolerate unna boot, is asking for alternate form of compression. He denies any concerns regarding wound. Denies fever, chills. Does note ongoing issues with diarrhea and inability to eat, is following with Dr. Hawa Alvarado for these issues. PAST MEDICAL HISTORY             Diagnosis Date    Bipolar 2 disorder [facility])     previously followed with Dr. David Hodge and Shavonne Bolton in [facility] BPH (benign prostatic hyperplasia)     COPD (chronic obstructive pulmonary disease) ([address]     Diabetes mellitus type 2, uncontrolled ([address]     HgbA1c on 2019 was 9.1.     Diabetic peripheral neuropathy (HCC)     Diabetic polyneuropathy ([address]     Diabetic ulcer of right foot associated with type 2 diabetes mellitus ([address] 12/10/2015    Essential hypertension     \"never been on b/p medication that I know of\"    GERD (gastroesophageal reflux disease)     Hammer toe of left foot     Heart murmur     denies any chest pain or palpitations    History of tobacco abuse     Hx of AKA (above knee amputation), right ([address] 2019    Dr. Itzel Lindsay    Hyperlipidemia     Macular edema, diabetic, bilateral ([address] 2018    Dr. Anil Burroughs referred to retina specialist for 2nd opinion    Marijuana abuse in remission     Melena     MRSA (methicillin resistant staph aureus) culture positive     Onychomycosis     Other disorders of kidney and ureter in diseases classified elsewhere     Sleep apnea     no cpap    Thyroid disease     WD-Skin ulcer of fourth toe of right foot with necrosis of bone ([address] 6/29/2016       PAST SURGICAL HISTORY     Past Surgical History:   Procedure Laterality Date    ABDOMEN SURGERY      ABSCESS DRAINAGE Right     foot    AMPUTATION ABOVE KNEE Right 4/18/2019    RIGHT ABOVE KNEE AMPUTATION performed by Amalia Llanos MD at [address] [facility], LAPAROSCOPIC N/A 4/1/2020    ROBOTIC CHOLECYSTECTOMY performed by Tavo Camacho MD at [address] N/A 7/20/2020    CYSTOSCOPY performed by Sydnee Thompson MD at [address] [facility] 8/21/2020    CYSTOSCOPY, UROLIFT performed by Sydnee Thompson MD at [address] [facility], ESOPHAGUS      ENDOSCOPY, COLON, DIAGNOSTIC      FOOT DEBRIDEMENT Right 07/01/2016    I & D    GASTROCNEMIUS RECESSION Left 11/12/2021    LEFT LEG GASTROCS RECESSION performed by Tyree Ruth MD at [address] [facility] [facility] Right 2/18/2019    I&D RIGHT STUMP performed by Amalia Llanos MD at [address] [facility] [facility] Right 07/20/2016    LEG AMPUTATION BELOW KNEE Right 4/4/2019    I&D AND REVISION OF AMPUTATION RIGHT LEG performed by Amlaia Llanos MD at [place] [address] Right 1/14/15    sole of foot I&D    IA DRAIN INFECT SHOULDER BURSA Left 8/18/2017    LEFT SHOULDER INCISION AND DRAINAGE performed by Amalia Llanos MD at [address] [facility] OFFICE/OUTPT [facility] Right 9/20/2018    EXCISIONAL DEBRIDEMENT RIGHT BKA STUMP performed by Neville Pacheco MD at [address] Right 1/16/15    2nd toe with wound vac applied    UPPER GASTROINTESTINAL ENDOSCOPY N/A 3/3/2020    EGD DILATION SAVORY performed by Renetta Dean MD at [address] [facility]  ? when       FAMILY HISTORY     Family History   Problem Relation Age of Onset    Diabetes Mother     Other Mother         pneumonia, H1N1    Depression Mother     Early Death Mother     High Blood Pressure Mother     High Cholesterol Mother     Vision Loss Maternal Grandmother     Arthritis Maternal Grandfather     Heart Disease Maternal Grandfather        SOCIAL HISTORY     Social History     Tobacco Use    Smoking status: Current Some Day Smoker     Packs/day: 0.00     Years: 10.00     Pack years: 0.00     Types: Cigars     Start date: 1985     Last attempt to quit: 2000     Years since quittin.3    Smokeless tobacco: Never Used   Vaping Use    Vaping Use: Former    Substances: Nicotine, Flavoring   Substance Use Topics    Alcohol use: Not Currently     Alcohol/week: 0.0 standard drinks    Drug use: No     Comment: 30 YEARS AGO       ALLERGIES     Allergies   Allergen Reactions    Pcn [Penicillins] Shortness Of Breath, Nausea And Vomiting and Other (See Comments)     Does not remember reactions. Has TOLERATED amoxicillin and several different cephalosporins.  Actos [Pioglitazone] Swelling    Clindamycin/Lincomycin Itching    Vancomycin Hives      Rash, with erythematous plaques to abdomen, shoulders, and proximal upper extremities with pruritis, persisted with 2nd dose administered slower.  Metformin And Related Swelling       MEDICATIONS     Current Outpatient Medications on File Prior to Encounter   Medication Sig Dispense Refill    Insulin Pen Needle (PEN NEEDLES) 31G X 8 MM MISC For use with insulin injections five timed per day dx:E11.9 150 each 1    Insulin Disposable Pump (OMNIPOD DASH 5 PACK PODS) MISC CHANGE PODS EVERY 48 HOURS AS DIRECTED 45 each 3    gabapentin (NEURONTIN) 300 MG capsule TAKE 1 CAPSULE BY MOUTH THREE TIMES DAILY.  90 capsule 2    insulin lispro (HUMALOG KWIKPEN U-200) 200 UNIT/ML SOPN pen 40 units Plus scale - Max dose 160 units per day 30 pen 2    nitroGLYCERIN (NITROSTAT) 0.3 MG SL tablet Place 1 tablet under the tongue every 5 minutes as needed for Chest pain up to max of 3 total doses. If no relief after 1 dose, call 911. 30 tablet 3    empagliflozin (JARDIANCE) 25 MG tablet Take 1 tablet by mouth daily New dose 30 tablet 5    Calcium-Phosphorus-Vitamin D (CALCIUM/D3 ADULT GUMMIES PO) Take 2 tablets by mouth daily      sertraline (ZOLOFT) 50 MG tablet Take 50 mg by mouth daily      Multiple Vitamins-Minerals (THERAPEUTIC MULTIVITAMIN-MINERALS) tablet Take 1 tablet by mouth daily      atorvastatin (LIPITOR) 40 MG tablet Take 1 tablet by mouth nightly 30 tablet 3    fenofibrate (TRICOR) 145 MG tablet Take 1 tablet by mouth daily 30 tablet 3    levothyroxine (SYNTHROID) 50 MCG tablet Take 1 tablet by mouth daily 90 tablet 1    metoprolol tartrate (LOPRESSOR) 50 MG tablet Take 1 tablet by mouth 2 times daily 90 tablet 3    Insulin Glargine, 2 Unit Dial, 300 UNIT/ML SOPN Change to 75 units BID and every 10 days increase by 5 units both times, to get am sugars 150-200 (Patient taking differently: Change to 80 units BID and every 10 days increase by 5 units both times, to get am sugars 150-200) 15 pen 2    Continuous Blood Gluc Transmit (DEXCOM G6 TRANSMITTER) MISC 1 Device by Does not apply route every 3 months 1 each 3    Continuous Blood Gluc Sensor (DEXCOM G6 SENSOR) MISC 1 Device by Does not apply route every 14 days 9 each 3    Continuous Blood Gluc  (DEXCOM G6 ) LIANNE 1 Device by Does not apply route 4 times daily (before meals and nightly) 1 Device 0    lisinopril (PRINIVIL;ZESTRIL) 5 MG tablet Take 1 tablet by mouth daily 90 tablet 1    ARIPiprazole (ABILIFY) 5 MG tablet Take 5 mg by mouth daily        No current facility-administered medications on file prior to encounter.        REVIEW OF SYSTEMS:     Constitutional: Denies fever, chills, night sweats, fatigue, weight loss/gain, loss of appetite   Head: Denies headache,  dizziness, loss of consciousness  Mouth/throat: Denies ulceration, dental caries , dysphagia  Respiratory: Denies shortness of breath, cough, wheezing, dyspnea with exertion  Cardiovascular:Denies chest pain, palpitations, edema  Gastrointestinal: +diarrhea Denies nausea, vomiting, constipation   ISMAEL: Denies dysuria, frequency, urgency, hematuria  Musculoskeletal: +right AKA, +left leg edema Denies joint pain,  Endocrine: Denies polyuria, polydipsia, cold or heat intolerance  Hematology: Denies easy brusing or bleeding, hx of clotting disorder  Dermatology: +wound Denies skin rash, eczema, pruritis    PHYSICAL EXAM:     BP (!) 150/67   Pulse 93   Temp 98 °F (36.7 °C) (Infrared)   Resp 18   SpO2 96%   Wt Readings from Last 3 Encounters:   01/04/22 223 lb (101.2 kg)   12/29/21 223 lb (101.2 kg)   12/03/21 223 lb (101.2 kg)       General:  Awake, alert, no apparent distress. Appears stated age  [de-identified]: conjuctivae are clear without exudate or hemorrhage, anicteric sclera, moist oral mucosa. Chest:  Respirations regular, non-labored. Chest rise and fall equal bilaterally. Neurological: Awake, alert, oriented x4  Psychiatric:  Appropriate mood and affect  Extremities: Right AKA. Left lower extremity Pedal pulses +2  Dorsal pedis pulse +2 Capillary refill less than 3 seconds. Skin:  Warm and dry  Wound:    Left lower extremity full thickness wound. Wound bed shows Granulation and Slough Draining  Moderate Serosanginous  Periwound skin hyperpigmented, edematous  No redness, warmth or induration. See wound measurement and imaging below. Wound 12/29/21 Leg Left (Active)   Wound Image   01/12/22 1429   Wound Etiology Venous 01/12/22 1429   Dressing Status Intact; Old drainage noted;New dressing applied 01/12/22 1429   Wound Cleansed Cleansed with saline 01/12/22 1429   Dressing/Treatment Ace wrap;Dry dressing 01/06/22 2000   Offloading for Diabetic Foot Ulcers No offloading required 01/12/22 1429   Wound Length (cm) 8 cm 01/12/22 1429   Wound Width (cm) 5.2 cm 01/12/22 1429   Wound Depth (cm) 0.2 cm 01/12/22 1429   Wound Surface Area (cm^2) 41.6 cm^2 01/12/22 1429   Wound Volume (cm^3) 8.32 cm^3 01/12/22 1429   Wound Assessment Slough;Pale granulation tissue; Hyper granulation tissue 01/12/22 1429   Drainage Amount Large 01/12/22 1429   Drainage Description Serous; Yellow 01/12/22 1429   Odor None 01/12/22 1429   Louise-wound Assessment Dry/flaky; Blanchable erythema;Edematous; Hyperpigmented; Induration 01/12/22 1429   Margins Attached edges 01/12/22 1429   Wound Thickness Description not for Pressure Injury Full thickness 01/12/22 1429   Number of days: 13         LABS/IMAGING     UA: No results for input(s): SPECGRAV, PHUR, COLORU, CLARITYU, MUCUS, PROTEINU, BLOODU, RBCUA, WBCUA, BACTERIA, NITRU, GLUCOSEU, BILIRUBINUR, UROBILINOGEN, KETUA, LABCAST, LABCASTTY, AMORPHOS in the last 72 hours. Invalid input(s): CRYSTALS  Micro:   Lab Results   Component Value Date    BC No growth-preliminary No growth  01/04/2022     ASSESSMENT     -Venous stasis ulcer, left leg     Ulcer Identification:  Ulcer Type: venous  Contributing Factors: edema, venous stasis, diabetes, poor glucose control, decreased mobility, obesity and smoking    Depth of Diabetic/Pressure/Non Pressure Ulcers or Wound:  ound, full thickness     Patient Active Problem List   Diagnosis Code    Status post below knee amputation of right lower extremity ([facility] Z89.511    Gait disturbance R26.9    Sebaceous cyst L72.3    Uncontrolled type 2 diabetes mellitus with hyperglycemia, with long-term current use of insulin ([facility]) E11.65, Z79.4    Severe bipolar II disorder, recent episode major depressive, remission ([facility]) F31.81    Bipolar 1 disorder ([facility]) F31.9    Leukocytosis D72.829    Lactic acidosis E87.2    Hyponatremia E87.1    Normocytic anemia D64.9    Obesity (BMI 30-39. 9) E66.9    History of noncompliance with medical treatment Z91.19    Essential hypertension I10    Tobacco dependence F17.200    Gastroesophageal reflux disease without esophagitis K21.9    History of marijuana use Z87.898    Physical debility R53.81    Anemia D64.9    Electrolyte imbalance E87.8    Type 2 diabetes mellitus with hyperglycemia, with long-term current use of insulin ([facility]) E11.65, Z79.4    Weakness R53.1    Infection of above knee amputation stump of right leg ([facility]) T87.43    Above knee amputation of right lower extremity ([facility]) Q71.156N    Sepsis ([facility]) A41.9    Vitamin D deficiency E55.9    COPD exacerbation ([facility]) J44.1    Influenza B J10.1    Depression, recurrent ([facility]) F33.9    Major depressive disorder, recurrent ([facility]) F33.9    GI bleed K92.2    Elevated lipase R74.8    Other psychoactive substance abuse, uncomplicated ([facility]) C18.33    Calculus of gallbladder without cholecystitis without obstruction K80.20    Right upper quadrant abdominal pain R10.11    Pedal edema R60.0    MURALI (obstructive sleep apnea) G47.33    Shortness of breath R06.02    Hypothyroid E03.9    Anxiety and depression F41.9, F32. A    Dyspnea R06.00    Sinus tachycardia W38.1    Metabolic acidosis C53.8    Edema of left lower extremity R60.0    SOB (shortness of breath) on exertion R06.02    Intermittent palpitations R00.2    History of chest pain Z87.898    Dizziness, nonspecific R42    Hyperlipidemia LDL goal <70 E78.5    Neuropathy G62.9    Venous stasis ulcer of left lower leg with edema of left lower leg ([facility]) I83.029, I83.892, L97.929, R60.9         PLAN     Patient examined and evaluated. All available lab work, radiology studies, and progress notes pertaining to Felicita Ice reviewed prior to or during patient visit today. -Venous stasis ulcer, left leg with edema- Wound appearance improved as compared to images taken during hospital stay. Patient not tolerating Unna boot. Will discontinue unna boot. Continue alginate to wound bed, start use of kerlix and coban wraps, change twice weekly per home health. -No indications of infection to leg at today's visit. Education provided on signs and symptoms of infection. Call clinic or seek emergency care should these occur. Follow up 2 weeks. Call clinic with any needs or concerns prior to scheduled visit. All questions and concerns addressed prior to discharge from today's visit. Please see attached Discharge Instructions    Written patient dismissal instructions given to patient and signed by patient or POA. I spent a total of 25 minutes reviewing previous notes, test results and face to face with Cristina Barker  on 1/12/2022. Greater than 50% of this time was spent on counseling, reviewing and discussing test results, answering questions, instructions on treatment and/or medications ordered, and coordinating the care based on my plan and assessment as noted. Discharge Instructions         Discharge Instructions       Visit Discharge/Physician Orders:    Home Care: [address] [place] [address]     Wound Location: Left leg     Dressing orders:     1) Gather wound care supplies and arrange on clean table. 2) Wash your hands with soap and water or use alcohol based hand  for 20 seconds (sing \"Happy Birthday\" twice). 3) Cleanse wounds with normal saline or wound cleanser and gauze. Pat dry with clean gauze. 4) Left leg- Apply silver alginate to wound. Cover with ABD. With kerlix then coban starting at base of toes wrap up 1-2 inches below the bend of the knee. Change two times a week on Mondays and Thursdays. Keep all dressings clean & dry. Do not shower, take baths or get wound wet, unless otherwise instructed by your Wound Care doctor. Follow up visit:   2 Weeks on Thursday January 27th at 10:00 am     Keep next scheduled appointment. Please give 24 hour notice if unable to keep appointment.  [phone]    If you experience any of the following, please call the Wound Care Service during business hours: Monday through Friday 8:00 am - 4:30 pm  ([phone]). *Increase in pain   *Temperature over 101   *Increase in drainage from your wound or a foul odor   *Uncontrolled swelling   *Need for compression bandage changes due to slippage, breakthrough drainage    If you need medical attention outside of business hours, please contact your Primary Care Doctor or go to the nearest emergency room.                    Electronically signed by Governor MARILYN Collins CNP on 1/12/2022 at 2:52 PM

## 2022-01-12 NOTE — TELEPHONE ENCOUNTER
Pt. Claims that every time he consumes food that he has diarrhea soon after. Pt. Stresses that he only eats one meal a day while taking a Lactose Pill to help him with his intolerance, eliminating the situation being mainly due to lactose issues. He claims a simple soda makes him have diarrhea. So the Pt. Now seeking help from  for his diarrhea.

## 2022-01-12 NOTE — PLAN OF CARE
Problem: Wound:  Goal: Will show signs of wound healing; wound closure and no evidence of infection  Description: Will show signs of wound healing; wound closure and no evidence of infection  Outcome: Ongoing     Patient presents to wound clinic for left leg wound. No s/s of infection noted. See AVS for discharge instructions. Follow up visit:   2 Weeks on Thursday January 27th at 10:00 am     Care plan reviewed with patient. Patient verbalize understanding of the plan of care and contribute to goal setting.

## 2022-01-13 NOTE — TELEPHONE ENCOUNTER
Pt called in today saying he is still having watery diarrhea. He was up all night with it. Only slept about 1 hr.  He has an appt on 1/18/21 and is worried about it. I advised him Dr. Acosta Joshi is not in today but I will try to get ahold of Dr. Acosta Joshi if I can. May need to test his stool. He says he is only eating 1 meal a day because of this.

## 2022-01-18 ENCOUNTER — OFFICE VISIT (OUTPATIENT)
Dept: INTERNAL MEDICINE CLINIC | Age: 52
End: 2022-01-18
Payer: MEDICARE

## 2022-01-18 ENCOUNTER — CARE COORDINATION (OUTPATIENT)
Dept: CASE MANAGEMENT | Age: 52
End: 2022-01-18

## 2022-01-18 VITALS — SYSTOLIC BLOOD PRESSURE: 122 MMHG | DIASTOLIC BLOOD PRESSURE: 74 MMHG | TEMPERATURE: 98.2 F | HEART RATE: 86 BPM

## 2022-01-18 DIAGNOSIS — G62.9 NEUROPATHY: ICD-10-CM

## 2022-01-18 DIAGNOSIS — I20.9 ANGINA PECTORIS, UNSPECIFIED (HCC): ICD-10-CM

## 2022-01-18 DIAGNOSIS — I10 ESSENTIAL HYPERTENSION: ICD-10-CM

## 2022-01-18 DIAGNOSIS — E66.01 SEVERE OBESITY (BMI 35.0-35.9 WITH COMORBIDITY) (HCC): ICD-10-CM

## 2022-01-18 DIAGNOSIS — Z79.4 TYPE 2 DIABETES MELLITUS WITH HYPERGLYCEMIA, WITH LONG-TERM CURRENT USE OF INSULIN (HCC): Primary | ICD-10-CM

## 2022-01-18 DIAGNOSIS — E11.65 TYPE 2 DIABETES MELLITUS WITH HYPERGLYCEMIA, WITH LONG-TERM CURRENT USE OF INSULIN (HCC): Primary | ICD-10-CM

## 2022-01-18 PROBLEM — I25.119 ATHEROSCLEROTIC HEART DISEASE OF NATIVE CORONARY ARTERY WITH UNSPECIFIED ANGINA PECTORIS (HCC): Status: ACTIVE | Noted: 2022-01-18

## 2022-01-18 PROCEDURE — 2022F DILAT RTA XM EVC RTNOPTHY: CPT | Performed by: INTERNAL MEDICINE

## 2022-01-18 PROCEDURE — G8484 FLU IMMUNIZE NO ADMIN: HCPCS | Performed by: INTERNAL MEDICINE

## 2022-01-18 PROCEDURE — G8417 CALC BMI ABV UP PARAM F/U: HCPCS | Performed by: INTERNAL MEDICINE

## 2022-01-18 PROCEDURE — 99213 OFFICE O/P EST LOW 20 MIN: CPT | Performed by: INTERNAL MEDICINE

## 2022-01-18 PROCEDURE — 3017F COLORECTAL CA SCREEN DOC REV: CPT | Performed by: INTERNAL MEDICINE

## 2022-01-18 PROCEDURE — G8427 DOCREV CUR MEDS BY ELIG CLIN: HCPCS | Performed by: INTERNAL MEDICINE

## 2022-01-18 PROCEDURE — 4004F PT TOBACCO SCREEN RCVD TLK: CPT | Performed by: INTERNAL MEDICINE

## 2022-01-18 PROCEDURE — 3051F HG A1C>EQUAL 7.0%<8.0%: CPT | Performed by: INTERNAL MEDICINE

## 2022-01-18 PROCEDURE — 1111F DSCHRG MED/CURRENT MED MERGE: CPT | Performed by: INTERNAL MEDICINE

## 2022-01-18 RX ORDER — NITROGLYCERIN 0.3 MG/1
0.3 TABLET SUBLINGUAL EVERY 5 MIN PRN
Qty: 30 TABLET | Refills: 3 | Status: SHIPPED | OUTPATIENT
Start: 2022-01-18 | End: 2022-04-26 | Stop reason: SDUPTHER

## 2022-01-18 RX ORDER — EMPAGLIFLOZIN 25 MG/1
1 TABLET, FILM COATED ORAL DAILY
Qty: 30 TABLET | Refills: 5 | Status: SHIPPED | OUTPATIENT
Start: 2022-01-18 | End: 2022-02-01

## 2022-01-18 RX ORDER — GABAPENTIN 300 MG/1
CAPSULE ORAL
Qty: 90 CAPSULE | Refills: 2 | Status: SHIPPED | OUTPATIENT
Start: 2022-01-18 | End: 2022-02-01

## 2022-01-18 ASSESSMENT — PATIENT HEALTH QUESTIONNAIRE - PHQ9
9. THOUGHTS THAT YOU WOULD BE BETTER OFF DEAD, OR OF HURTING YOURSELF: 0
7. TROUBLE CONCENTRATING ON THINGS, SUCH AS READING THE NEWSPAPER OR WATCHING TELEVISION: 0
1. LITTLE INTEREST OR PLEASURE IN DOING THINGS: 0
5. POOR APPETITE OR OVEREATING: 0
SUM OF ALL RESPONSES TO PHQ QUESTIONS 1-9: 0
8. MOVING OR SPEAKING SO SLOWLY THAT OTHER PEOPLE COULD HAVE NOTICED. OR THE OPPOSITE, BEING SO FIGETY OR RESTLESS THAT YOU HAVE BEEN MOVING AROUND A LOT MORE THAN USUAL: 0
4. FEELING TIRED OR HAVING LITTLE ENERGY: 0
SUM OF ALL RESPONSES TO PHQ QUESTIONS 1-9: 0
6. FEELING BAD ABOUT YOURSELF - OR THAT YOU ARE A FAILURE OR HAVE LET YOURSELF OR YOUR FAMILY DOWN: 0
10. IF YOU CHECKED OFF ANY PROBLEMS, HOW DIFFICULT HAVE THESE PROBLEMS MADE IT FOR YOU TO DO YOUR WORK, TAKE CARE OF THINGS AT HOME, OR GET ALONG WITH OTHER PEOPLE: 0
2. FEELING DOWN, DEPRESSED OR HOPELESS: 0
SUM OF ALL RESPONSES TO PHQ QUESTIONS 1-9: 0
3. TROUBLE FALLING OR STAYING ASLEEP: 0
SUM OF ALL RESPONSES TO PHQ9 QUESTIONS 1 & 2: 0
SUM OF ALL RESPONSES TO PHQ QUESTIONS 1-9: 0

## 2022-01-18 NOTE — CARE COORDINATION
Lanie [facility] Transitions Follow Up Call    2022    Patient: Carrie Morin  Patient : 1970   MRN: [id number]  Reason for Admission: L leg swelling  Discharge Date: 22 RARS: Readmission Risk Score: 20.4 ( )         Spoke with: Blanca Perales today and he says he is doing real good. Just got back from seeing Dr. Hawa Alvarado and was very happy with his report about his blood sugars being under control. He is still wearing the Unna boot and HH is still coming every week. He thinks the L leg is healing. He goes back to ID 22. He said his BP is 122/74 and blood sugars are under 200. Denies pain in L leg/fever/n/v/d. Eating, drinking & sleeping well. Denies problems with urination/bowels. No other concerns voiced at this time. Care Transitions Subsequent and Final Call    Subsequent and Final Calls  Do you have any ongoing symptoms?: Yes  Onset of Patient-reported symptoms: In the past 7 days  Interventions for patient-reported symptoms: Other  Have your medications changed?: No  Do you have any questions related to your medications?: No  Do you currently have any active services?: Yes  Are you currently active with any services?: Home Health  Do you have any needs or concerns that I can assist you with?: No  Identified Barriers: Lack of Education  Care Transitions Interventions  No Identified Needs  Other Interventions:       Care Transitions Follow Up Call    Needs to be reviewed by the provider   Additional needs identified to be addressed with provider: No  none             Method of communication with provider : none      Care Transition Nurse (CTN) contacted the patient by telephone to follow up after admission on 22. Verified name and  with patient as identifiers. Addressed changes since last contact: home health [address]  Discussed follow-up appointments. If no appointment was previously scheduled, appointment scheduling offered: Yes.    Is follow up appointment scheduled within 7 days of discharge? Yes. Advance Care Planning:   Does patient have an Advance Directive: on file. CTN reviewed discharge instructions, medical action plan and red flags with patient and discussed any barriers to care and/or understanding of plan of care after discharge. Discussed appropriate site of care based on symptoms and resources available to patient including: PCP, Specialist and Home health. The patient agrees to contact the PCP office for questions related to their healthcare. Patients top risk factors for readmission: functional physical ability  lack of knowledge about disease  medical condition-L leg cellulitis, DM, HTN  multiple health system providers  Interventions to address risk factors: Scheduled appointment with PCP-today and Scheduled appointment with Specialist-1/27/22 ID      [facility] follow up appointment(s):     CTN provided contact information for future needs. Plan for follow-up call in 7-10 days based on severity of symptoms and risk factors.   Plan for next call: symptom management-L leg swelling, FBS, BP            Follow Up  Future Appointments   Date Time Provider Ebenezer Carmichael   1/27/2022 10:00  [facility], APRN - [facility] [address] [facility]   2/14/2022 12:45 PM MD JULIAN Renee [facility] [email or web address]   2/21/2022  3:30 PM Kelli Arteaga RD, LD [facility] Physic [email or web address] - [place]   3/22/2022 11:00 AM NOELLE Perry [facility] Physic [address]   4/27/2022  9:45 AM MD Danielito Alonzo RN

## 2022-01-18 NOTE — PROGRESS NOTES
[address] [facility] Internal Medicine  [facility] [address]  [facility], [address] [facility]  Dept: [phone]  Dept Fax: [phone]    YOB: 1970    This patient presents for post hospitalization follow up. Recent medical records were reviewed from hospital.  Dr. Levander Osgood has done the surgery   Back on Nov 12, 21 , and completed the antibiotics. Due to ongoing infection , seen dr. Toby Miranda recently at hospital, [facility], and they are changing the dressing. He gets around in a wheel chair, A1c 7.1 has vastly improved, he is off the omnipod and using the humalog and glargine insulin with good results. And he does have dex com. , no recent LOC, no fever or chills. Diarrhea is resolved, no bloody stools, no abd pain. Got his covid booster dose. Nabil Settler at times he feels his HR goes up. Patient Active Problem List   Diagnosis    Status post below knee amputation of right lower extremity (HCC)    Gait disturbance    Sebaceous cyst    Uncontrolled type 2 diabetes mellitus with hyperglycemia, with long-term current use of insulin (HCC)    Severe bipolar II disorder, recent episode major depressive, remission ([facility] [address]    Bipolar 1 disorder (HCC)    Leukocytosis    Lactic acidosis    Hyponatremia    Normocytic anemia    Obesity (BMI 30-39. 9)    History of noncompliance with medical treatment    Essential hypertension    Tobacco dependence    Gastroesophageal reflux disease without esophagitis    History of marijuana use    Physical debility    Anemia    Electrolyte imbalance    Type 2 diabetes mellitus with hyperglycemia, with long-term current use of insulin (HCC)    Weakness    Infection of above knee amputation stump of right leg (HCC)    Above knee amputation of right lower extremity (HCC)    Sepsis (HCC)    Vitamin D deficiency    COPD exacerbation (HCC)    Influenza B    Depression, recurrent (HCC)    Major depressive disorder, recurrent (HCC)    GI bleed    Elevated lipase    Other psychoactive substance abuse, uncomplicated ([facility] [address]    Calculus of gallbladder without cholecystitis without obstruction    Right upper quadrant abdominal pain    Pedal edema    MURALI (obstructive sleep apnea)    Shortness of breath    Hypothyroid    Anxiety and depression    Dyspnea    Sinus tachycardia    Metabolic acidosis    Edema of left lower extremity    SOB (shortness of breath) on exertion    Intermittent palpitations    History of chest pain    Dizziness, nonspecific    Hyperlipidemia LDL goal <70    Neuropathy    Venous stasis ulcer of left lower leg with edema of left lower leg (HCC)       Current Outpatient Medications   Medication Sig Dispense Refill    Insulin Pen Needle (PEN NEEDLES) 31G X 8 MM MISC For use with insulin injections five timed per day dx:E11.9 150 each 1    gabapentin (NEURONTIN) 300 MG capsule TAKE 1 CAPSULE BY MOUTH THREE TIMES DAILY. 90 capsule 2    insulin lispro (HUMALOG KWIKPEN U-200) 200 UNIT/ML SOPN pen 40 units Plus scale - Max dose 160 units per day 30 pen 2    nitroGLYCERIN (NITROSTAT) 0.3 MG SL tablet Place 1 tablet under the tongue every 5 minutes as needed for Chest pain up to max of 3 total doses.  If no relief after 1 dose, call 911. 30 tablet 3    empagliflozin (JARDIANCE) 25 MG tablet Take 1 tablet by mouth daily New dose 30 tablet 5    Calcium-Phosphorus-Vitamin D (CALCIUM/D3 ADULT GUMMIES PO) Take 2 tablets by mouth daily      sertraline (ZOLOFT) 50 MG tablet Take 50 mg by mouth daily      atorvastatin (LIPITOR) 40 MG tablet Take 1 tablet by mouth nightly 30 tablet 3    fenofibrate (TRICOR) 145 MG tablet Take 1 tablet by mouth daily 30 tablet 3    levothyroxine (SYNTHROID) 50 MCG tablet Take 1 tablet by mouth daily 90 tablet 1    metoprolol tartrate (LOPRESSOR) 50 MG tablet Take 1 tablet by mouth 2 times daily 90 tablet 3    Insulin Glargine, 2 Unit Dial, 300 UNIT/ML SOPN Change to 75 units BID and every 10 days increase by 5 units both times, to get am sugars 150-200 (Patient taking differently: Change to 80 units BID and every 10 days increase by 5 units both times, to get am sugars 150-200) 15 pen 2    Continuous Blood Gluc Transmit (DEXCOM G6 TRANSMITTER) MISC 1 Device by Does not apply route every 3 months 1 each 3    Continuous Blood Gluc Sensor (DEXCOM G6 SENSOR) MISC 1 Device by Does not apply route every 14 days 9 each 3    Continuous Blood Gluc  (DEXCOM G6 ) LIANNE 1 Device by Does not apply route 4 times daily (before meals and nightly) 1 Device 0    lisinopril (PRINIVIL;ZESTRIL) 5 MG tablet Take 1 tablet by mouth daily 90 tablet 1    ARIPiprazole (ABILIFY) 5 MG tablet Take 5 mg by mouth daily        No current facility-administered medications for this visit. Allergies   Allergen Reactions    Pcn [Penicillins] Shortness Of Breath, Nausea And Vomiting and Other (See Comments)     Does not remember reactions. Has TOLERATED amoxicillin and several different cephalosporins.  Actos [Pioglitazone] Swelling    Clindamycin/Lincomycin Itching    Vancomycin Hives      Rash, with erythematous plaques to abdomen, shoulders, and proximal upper extremities with pruritis, persisted with 2nd dose administered slower.  Metformin And Related Swelling       Review of Systems     /74 (Site: Left Upper Arm)   Pulse 86   Temp 98.2 °F (36.8 °C)       Physical Exam:    General appearance - alert, well appearing, and in no distress and overweight, in a wheel chair   Mental status - alert and oriented    Neck - supple, no significant adenopathy  Chest - clear to auscultation, no wheezes, rales or rhonchi, symmetric air entry  Heart - normal rate, regular rhythm, normal S1, S2, no murmurs, rubs, clicks or gallops  Abdomen - soft, nontender, nondistended, no masses or organomegaly  no abdominal bruits.   Obese Protuberant: No  Neurological - alert, oriented, normal speech, no focal findings or movement disorder noted, motor and sensory grossly normal bilaterally  Musculoskeletal - no joint tenderness, deformity or swelling  RLE AKA   Extremities - peripheral pulses normal, no pedal edema, no clubbing or cyanosis, Eugene's sign negative bilaterally  Prosthesis: No  Skin - normal coloration and turgor, no rashes, no suspicious skin lesions noted        I have reviewed recent diagnostic testing including labs, EKG. Please see EKG for interpretation. Diagnosis Orders   1. Uncontrolled type 2 diabetes mellitus with hyperglycemia, with long-term current use of insulin ([facility] [address]     2. Neuropathy           Plan:    No orders of the defined types were placed in this encounter. Outpatient Encounter Medications as of 1/18/2022   Medication Sig Dispense Refill    Insulin Pen Needle (PEN NEEDLES) 31G X 8 MM MISC For use with insulin injections five timed per day dx:E11.9 150 each 1    gabapentin (NEURONTIN) 300 MG capsule TAKE 1 CAPSULE BY MOUTH THREE TIMES DAILY. 90 capsule 2    insulin lispro (HUMALOG KWIKPEN U-200) 200 UNIT/ML SOPN pen 40 units Plus scale - Max dose 160 units per day 30 pen 2    nitroGLYCERIN (NITROSTAT) 0.3 MG SL tablet Place 1 tablet under the tongue every 5 minutes as needed for Chest pain up to max of 3 total doses.  If no relief after 1 dose, call 911. 30 tablet 3    empagliflozin (JARDIANCE) 25 MG tablet Take 1 tablet by mouth daily New dose 30 tablet 5    Calcium-Phosphorus-Vitamin D (CALCIUM/D3 ADULT GUMMIES PO) Take 2 tablets by mouth daily      sertraline (ZOLOFT) 50 MG tablet Take 50 mg by mouth daily      atorvastatin (LIPITOR) 40 MG tablet Take 1 tablet by mouth nightly 30 tablet 3    fenofibrate (TRICOR) 145 MG tablet Take 1 tablet by mouth daily 30 tablet 3    levothyroxine (SYNTHROID) 50 MCG tablet Take 1 tablet by mouth daily 90 tablet 1    metoprolol tartrate (LOPRESSOR) 50 MG tablet Take 1 tablet by mouth 2 times daily 90 tablet 3    Insulin Glargine, 2 Unit Dial, 300 UNIT/ML SOPN Change to 75 units BID and every 10 days increase by 5 units both times, to get am sugars 150-200 (Patient taking differently: Change to 80 units BID and every 10 days increase by 5 units both times, to get am sugars 150-200) 15 pen 2    Continuous Blood Gluc Transmit (DEXCOM G6 TRANSMITTER) MISC 1 Device by Does not apply route every 3 months 1 each 3    Continuous Blood Gluc Sensor (DEXCOM G6 SENSOR) MISC 1 Device by Does not apply route every 14 days 9 each 3    Continuous Blood Gluc  (DEXCOM G6 ) LIANNE 1 Device by Does not apply route 4 times daily (before meals and nightly) 1 Device 0    lisinopril (PRINIVIL;ZESTRIL) 5 MG tablet Take 1 tablet by mouth daily 90 tablet 1    ARIPiprazole (ABILIFY) 5 MG tablet Take 5 mg by mouth daily       [DISCONTINUED] Insulin Disposable Pump (OMNIPOD DASH 5 PACK PODS) MISC CHANGE PODS EVERY 48 HOURS AS DIRECTED 45 each 3    [DISCONTINUED] Multiple Vitamins-Minerals (THERAPEUTIC MULTIVITAMIN-MINERALS) tablet Take 1 tablet by mouth daily       No facility-administered encounter medications on file as of 1/18/2022. Controlled Substances Monitoring:       Diagnosis Orders   1. Type 2 diabetes mellitus with hyperglycemia, with long-term current use of insulin ([facility] [address]     2. Neuropathy     3. Essential hypertension         Will schedule follow up appointment in 3- 4 months. Plan:    Overall I am quite pleased with his DM-2 , previously uncontrolled, now doing better with A1c of 7.1 . I congratulated him on that, and ongoing compliance with diet and life style modification, and meds has been highly promoted. No change in insulin regimen, follow up closely with our diabetic clinic. Occasional palpitations - last several EKG over the last yr have been in NSR, if this recurrs will consider event monitor or further work up. He denies any MURALI symptoms, rec weight loss with BMI of 36 . Signed by : Bria Ewing M.D.       [address] [facility] Internal Medicine  [address] [facility], [address] [place], [address]    Tel  [phone]  Fax [phone]

## 2022-01-22 ENCOUNTER — APPOINTMENT (OUTPATIENT)
Dept: GENERAL RADIOLOGY | Age: 52
End: 2022-01-22
Payer: MEDICARE

## 2022-01-22 ENCOUNTER — APPOINTMENT (OUTPATIENT)
Dept: CT IMAGING | Age: 52
End: 2022-01-22
Payer: MEDICARE

## 2022-01-22 ENCOUNTER — HOSPITAL ENCOUNTER (EMERGENCY)
Age: 52
Discharge: HOME | End: 2022-01-22
Attending: EMERGENCY MEDICINE
Payer: MEDICARE

## 2022-01-22 VITALS
HEART RATE: 100 BPM | OXYGEN SATURATION: 100 % | RESPIRATION RATE: 20 BRPM | BODY MASS INDEX: 35.84 KG/M2 | WEIGHT: 223 LBS | TEMPERATURE: 98.2 F | DIASTOLIC BLOOD PRESSURE: 84 MMHG | HEIGHT: 66 IN | SYSTOLIC BLOOD PRESSURE: 116 MMHG

## 2022-01-22 DIAGNOSIS — E87.20 LACTIC ACIDOSIS: ICD-10-CM

## 2022-01-22 DIAGNOSIS — R77.8 ELEVATED TROPONIN: ICD-10-CM

## 2022-01-22 DIAGNOSIS — U07.1 COVID-19: Primary | ICD-10-CM

## 2022-01-22 LAB
ANION GAP SERPL CALCULATED.3IONS-SCNC: 14 MEQ/L (ref 8–16)
BASOPHILS # BLD: 0.6 %
BASOPHILS ABSOLUTE: 0 THOU/MM3 (ref 0–0.1)
BUN BLDV-MCNC: 16 MG/DL (ref 7–22)
C-REACTIVE PROTEIN: 2.35 MG/DL (ref 0–1)
CALCIUM SERPL-MCNC: 9.7 MG/DL (ref 8.5–10.5)
CHLORIDE BLD-SCNC: 96 MEQ/L (ref 98–111)
CO2: 24 MEQ/L (ref 23–33)
CREAT SERPL-MCNC: 1 MG/DL (ref 0.4–1.2)
D-DIMER QUANTITATIVE: 811 NG/ML FEU (ref 0–500)
EKG ATRIAL RATE: 119 BPM
EKG P AXIS: 47 DEGREES
EKG P-R INTERVAL: 172 MS
EKG Q-T INTERVAL: 318 MS
EKG QRS DURATION: 74 MS
EKG QTC CALCULATION (BAZETT): 447 MS
EKG R AXIS: 12 DEGREES
EKG T AXIS: 53 DEGREES
EKG VENTRICULAR RATE: 119 BPM
EOSINOPHIL # BLD: 3.6 %
EOSINOPHILS ABSOLUTE: 0.3 THOU/MM3 (ref 0–0.4)
ERYTHROCYTE [DISTWIDTH] IN BLOOD BY AUTOMATED COUNT: 13.6 % (ref 11.5–14.5)
ERYTHROCYTE [DISTWIDTH] IN BLOOD BY AUTOMATED COUNT: 43.4 FL (ref 35–45)
FERRITIN: 435 NG/ML (ref 22–322)
FLU A ANTIGEN: NEGATIVE
FLU B ANTIGEN: NEGATIVE
GFR SERPL CREATININE-BSD FRML MDRD: 78 ML/MIN/1.73M2
GLUCOSE BLD-MCNC: 146 MG/DL (ref 70–108)
HCT VFR BLD CALC: 41 % (ref 42–52)
HEMOGLOBIN: 14.4 GM/DL (ref 14–18)
IMMATURE GRANS (ABS): 0.06 THOU/MM3 (ref 0–0.07)
IMMATURE GRANULOCYTES: 0.8 %
LACTIC ACID, SEPSIS: 2.1 MMOL/L (ref 0.5–1.9)
LACTIC ACID, SEPSIS: 2.8 MMOL/L (ref 0.5–1.9)
LD: 278 U/L (ref 100–190)
LYMPHOCYTES # BLD: 10 %
LYMPHOCYTES ABSOLUTE: 0.7 THOU/MM3 (ref 1–4.8)
MCH RBC QN AUTO: 30.9 PG (ref 26–33)
MCHC RBC AUTO-ENTMCNC: 35.1 GM/DL (ref 32.2–35.5)
MCV RBC AUTO: 88 FL (ref 80–94)
MONOCYTES # BLD: 12.3 %
MONOCYTES ABSOLUTE: 0.9 THOU/MM3 (ref 0.4–1.3)
NUCLEATED RED BLOOD CELLS: 0 /100 WBC
OSMOLALITY CALCULATION: 272.1 MOSMOL/KG (ref 275–300)
PLATELET # BLD: 242 THOU/MM3 (ref 130–400)
PMV BLD AUTO: 9.1 FL (ref 9.4–12.4)
POTASSIUM SERPL-SCNC: 3.9 MEQ/L (ref 3.5–5.2)
PROCALCITONIN: 0.14 NG/ML (ref 0.01–0.09)
RBC # BLD: 4.66 MILL/MM3 (ref 4.7–6.1)
SARS-COV-2, NAAT: DETECTED
SEG NEUTROPHILS: 72.7 %
SEGMENTED NEUTROPHILS ABSOLUTE COUNT: 5.2 THOU/MM3 (ref 1.8–7.7)
SODIUM BLD-SCNC: 134 MEQ/L (ref 135–145)
TROPONIN T: 0.01 NG/ML
TROPONIN T: 0.02 NG/ML
WBC # BLD: 7.1 THOU/MM3 (ref 4.8–10.8)

## 2022-01-22 PROCEDURE — 85025 COMPLETE CBC W/AUTO DIFF WBC: CPT

## 2022-01-22 PROCEDURE — 85379 FIBRIN DEGRADATION QUANT: CPT

## 2022-01-22 PROCEDURE — 84484 ASSAY OF TROPONIN QUANT: CPT

## 2022-01-22 PROCEDURE — 87804 INFLUENZA ASSAY W/OPTIC: CPT

## 2022-01-22 PROCEDURE — 84145 PROCALCITONIN (PCT): CPT

## 2022-01-22 PROCEDURE — 36415 COLL VENOUS BLD VENIPUNCTURE: CPT

## 2022-01-22 PROCEDURE — 96360 HYDRATION IV INFUSION INIT: CPT

## 2022-01-22 PROCEDURE — 96361 HYDRATE IV INFUSION ADD-ON: CPT

## 2022-01-22 PROCEDURE — 83615 LACTATE (LD) (LDH) ENZYME: CPT

## 2022-01-22 PROCEDURE — 2580000003 HC RX 258: Performed by: EMERGENCY MEDICINE

## 2022-01-22 PROCEDURE — 71045 X-RAY EXAM CHEST 1 VIEW: CPT

## 2022-01-22 PROCEDURE — 93010 ELECTROCARDIOGRAM REPORT: CPT | Performed by: NUCLEAR MEDICINE

## 2022-01-22 PROCEDURE — 6370000000 HC RX 637 (ALT 250 FOR IP): Performed by: EMERGENCY MEDICINE

## 2022-01-22 PROCEDURE — 86140 C-REACTIVE PROTEIN: CPT

## 2022-01-22 PROCEDURE — 80048 BASIC METABOLIC PNL TOTAL CA: CPT

## 2022-01-22 PROCEDURE — 93005 ELECTROCARDIOGRAM TRACING: CPT | Performed by: EMERGENCY MEDICINE

## 2022-01-22 PROCEDURE — 71275 CT ANGIOGRAPHY CHEST: CPT

## 2022-01-22 PROCEDURE — 83605 ASSAY OF LACTIC ACID: CPT

## 2022-01-22 PROCEDURE — 87040 BLOOD CULTURE FOR BACTERIA: CPT

## 2022-01-22 PROCEDURE — 87635 SARS-COV-2 COVID-19 AMP PRB: CPT

## 2022-01-22 PROCEDURE — 99285 EMERGENCY DEPT VISIT HI MDM: CPT

## 2022-01-22 PROCEDURE — 6360000004 HC RX CONTRAST MEDICATION: Performed by: EMERGENCY MEDICINE

## 2022-01-22 PROCEDURE — 82728 ASSAY OF FERRITIN: CPT

## 2022-01-22 RX ORDER — HYDROCODONE BITARTRATE AND ACETAMINOPHEN 5; 325 MG/1; MG/1
1 TABLET ORAL ONCE
Status: COMPLETED | OUTPATIENT
Start: 2022-01-22 | End: 2022-01-22

## 2022-01-22 RX ORDER — ASPIRIN 81 MG/1
324 TABLET, CHEWABLE ORAL ONCE
Status: COMPLETED | OUTPATIENT
Start: 2022-01-22 | End: 2022-01-22

## 2022-01-22 RX ORDER — 0.9 % SODIUM CHLORIDE 0.9 %
1000 INTRAVENOUS SOLUTION INTRAVENOUS ONCE
Status: COMPLETED | OUTPATIENT
Start: 2022-01-22 | End: 2022-01-22

## 2022-01-22 RX ORDER — SODIUM CHLORIDE, SODIUM LACTATE, POTASSIUM CHLORIDE, AND CALCIUM CHLORIDE .6; .31; .03; .02 G/100ML; G/100ML; G/100ML; G/100ML
500 INJECTION, SOLUTION INTRAVENOUS ONCE
Status: DISCONTINUED | OUTPATIENT
Start: 2022-01-22 | End: 2022-01-22 | Stop reason: HOSPADM

## 2022-01-22 RX ORDER — ASCORBIC ACID 500 MG
500 TABLET ORAL 2 TIMES DAILY
Qty: 14 TABLET | Refills: 0 | Status: SHIPPED | OUTPATIENT
Start: 2022-01-22 | End: 2022-02-18

## 2022-01-22 RX ORDER — ACETAMINOPHEN 500 MG
1000 TABLET ORAL ONCE
Status: COMPLETED | OUTPATIENT
Start: 2022-01-22 | End: 2022-01-22

## 2022-01-22 RX ORDER — ZINC SULFATE 50(220)MG
50 CAPSULE ORAL DAILY
Qty: 7 CAPSULE | Refills: 0 | Status: SHIPPED | OUTPATIENT
Start: 2022-01-22 | End: 2022-05-31

## 2022-01-22 RX ADMIN — ACETAMINOPHEN 1000 MG: 500 TABLET ORAL at 01:25

## 2022-01-22 RX ADMIN — ASPIRIN 81 MG 324 MG: 81 TABLET ORAL at 04:46

## 2022-01-22 RX ADMIN — IOPAMIDOL 80 ML: 755 INJECTION, SOLUTION INTRAVENOUS at 04:33

## 2022-01-22 RX ADMIN — SODIUM CHLORIDE 1000 ML: 9 INJECTION, SOLUTION INTRAVENOUS at 03:45

## 2022-01-22 RX ADMIN — HYDROCODONE BITARTRATE AND ACETAMINOPHEN 1 TABLET: 5; 325 TABLET ORAL at 02:17

## 2022-01-22 RX ADMIN — SODIUM CHLORIDE 1000 ML: 9 INJECTION, SOLUTION INTRAVENOUS at 01:08

## 2022-01-22 ASSESSMENT — PAIN SCALES - GENERAL
PAINLEVEL_OUTOF10: 0
PAINLEVEL_OUTOF10: 0

## 2022-01-22 ASSESSMENT — ENCOUNTER SYMPTOMS
ABDOMINAL PAIN: 0
COUGH: 1
VOMITING: 1
SINUS PRESSURE: 0
EYE REDNESS: 0
PHOTOPHOBIA: 0
SORE THROAT: 0
SHORTNESS OF BREATH: 1
COLOR CHANGE: 0
CHEST TIGHTNESS: 0
NAUSEA: 0
BACK PAIN: 0

## 2022-01-22 NOTE — ED NOTES
PATIENT:  Khoa Painting Jr.   : 1955  Referring physician: Rosa Zacarias CNP      CHIEF COMPLAINT:   Chief Complaint   Patient presents with   • Follow-up     4 week        HISTORY OF PRESENT ILLNESS:  Sreedhar is a 65 year old male with history of CKD stage 3, hypertension, hyperlipidemia, asthma, NSVT and COPD.  Paroxysmal atrial fibrillation in the setting of neck surgery in .     Discharged [facility] after undergoing cervical decompression and fusion of C3 to C7 by Dr. Martin last 20.   Had 2 more hospital visits with atrial fibrillation which converted when started with Cardizem second time with flecainide.  He was then sent home on Coumadin, then was readmitted on 20 with neck pain and underwent I and D for retropharyngeal abscess the same day.  He was found again to be back in atrial fibrillation with rapid ventricular response in the ER, then converted again on Cardizem gtt.    Presenting today for a cardiovascular follow up.  The patient has been doing well from a cardiac perspective. No chest pain, shortness of breath, orthopnea or PND. No syncope. The patient is moderately active without any significant symptoms at this time.  Patient is compliant with medical therapy.Medication list reviewed, patient is compliant without side effects.     He has a kardia device and is monitoring his heart rates.  He has been in sinus rhythm since surgery.  Has been off of anticoagulation secondary to neck surgery and abscess.  He is concerned about going back on anticoagulation.        PAST MEDICAL HISTORY:    Past Medical History:   Diagnosis Date   • Allergy    • [place] esophagus    • Chronic kidney disease     Stage 3 moderate   • Essential (primary) hypertension    • NSVT (nonsustained ventricular tachycardia) (CMS/HCC)    • RAD (reactive airway disease)    • Retropharyngeal abscess 2020   • Retropharyngeal abscess 2020        ALLERGIES:    ALLERGIES:   Allergen  Pt restful on cart- medicated per orders. Sitting in position of comfort w/ lights off.       Tarun Barraza RN  01/22/22 8100 Reactions   • Penicillin V ANAPHYLAXIS   • Demerol RASH       MEDICATIONS:     Outpatient Medications Prior to Visit   Medication Sig Dispense Refill   • hydrALAZINE (APRESOLINE) 50 MG tablet Take 1 tablet by mouth 3 times daily. 90 tablet 3   • HYDROcodone-acetaminophen (NORCO) 5-325 MG per tablet Take 1 tablet by mouth every 8 hours as needed for Pain. 50 tablet 0   • cyclobenzaprine (FLEXERIL) 10 MG tablet Take 10 mg by mouth every evening as needed for Muscle spasms.     • dilTIAZem (TIAZAC) 180 MG 24 hr capsule Take 180 mg by mouth daily.     • AMIODarone (PACERONE) 200 MG tablet Take 200 mg by mouth daily. 400 mg bid x 7 days, then 200 mg daily     • metoPROLOL succinate (TOPROL-XL) 100 MG 24 hr tablet Take 100 mg by mouth daily.     • tizanidine (Zanaflex) 4 MG capsule Take 1 capsule by mouth 3 times daily as needed for Muscle spasms. 50 capsule 1   • atorvastatin (LIPITOR) 20 MG tablet Take 20 mg by mouth daily.     • omeprazole (PriLOSEC) 40 MG capsule Take 40 mg by mouth daily.     • warfarin (COUMADIN) 3 MG tablet Take 3 mg by mouth.     • flecainide (TAMBOCOR) 100 MG tablet Take 100 mg by mouth 2 times daily.       No facility-administered medications prior to visit.        SOCIAL HISTORY:    Social History     Tobacco Use   • Smoking status: Former Smoker     Quit date: 2016     Years since quittin.0   • Smokeless tobacco: Never Used   Substance Use Topics   • Alcohol use: Yes     Frequency: Monthly or less   • Drug use: Not Currently       FAMILY HISTORY:    Family History   Problem Relation Age of Onset   • Atrial Fibrilliation Mother    • Patient is unaware of any medical problems Father        REVIEW OF SYSTEMS:    Respiratory: Cough: denies.   Constitutional: Fatigue: denies.   Dermatologic: Rash: denies.   Endocrine: Sleep disturbance: denies.   Gastrointestinal: Abdomina pain: denies.   Hematologic: Abnormal bleeding: denies.   Musculoskeletal: Muscle aches: denies.   Neurologic: Dizziness:  denies.   Ophthalmologic: Loss of vision: denies.   Psychology: Depression: denies.   Urologic: Blood in urine: denies.   Cardiac: As per HPI    Remainder are reviewed and are negative.       PHYSICAL EXAMINATION:  /76 (BP Location: RUE - Right upper extremity)   Pulse 72   Temp 98.7 °F (37.1 °C)   Ht 6' 3\" (1.905 m)   Wt 71.8 kg (158 lb 4.6 oz)   BMI 19.78 kg/m²   BSA 1.99 m²     Constitutional: appears well-developed and well-nourished. Alert and oriented.   Head: Normocephalic and atraumatic.   Nose: Nose normal.   Mouth/Throat: Mucous membranes are moist.  Normal oropharynx  Eyes: Pupils are equal, round, and reactive to light.   Neck: Normal range of motion. Neck supple. No thyromegaly  Cardiovascular: Regular rate and rhythm, normal S1, S2 no S3 or S4. No murmur, rub or gallops  Pulmonary: Effort normal and breath sounds normal. No wheezes, rales or rhonchi  Abdominal: Soft. Bowel sounds are normal. No hepatosplenomegaly.  Musculoskeletal: Normal range of motion. No tenderness.   Neurological: grossly normal.   Skin: Skin is warm and dry.   Psych: normal mood and affect  Extremities: no significant edema  Pulses: LE DP/PT palpable    I personally reviewed and interpreted recent laboratory values in the chart.   LABS  No results found for: HGBA1C    Cholesterol (mg/dL)   Date Value   11/09/2020 141     HDL (mg/dL)   Date Value   11/09/2020 44     TRIGLYCERIDE (mg/dL)   Date Value   11/09/2020 56     CALCULATED LDL (mg/dL)   Date Value   11/09/2020 86       CARDIAC TESTING:   I have reviewed the pertinent imaging study reports. These are the pertinent findings:  · ECG performed and personally reviewed today -   · Labs- 12/2020: Hgb 11.6, Cr 0.85, K 4.2,   · TTE - 11/9/20: normal LV systolic function with estimated EF at 74%. No significant valvular abnormality.     ASSESSMENT AND PLAN:  65 year old with     Atrial fibrillation  Diagnosis from 11/8/20.  Patient finished amiodarone loading and  currently is now on 200 mg daily.  He is also supposed to be taking diltiazem 180 mg daily, and Toprol  mg daily.   [id number] score is 2 and therefore he would need anticoagulation.  Patient also had previous episode of bright red blood per rectum.  He was seen and evaluated by GI service of the hospital for this and was cleared to start on anticoagulation.  He is very concerned about taking anticoagulation.  He states he never had atrial fibrillation and this was in the setting of neck surgery and infection with retropharyngeal abscess when he had the 2 episodes.  From my perspective I still think he would need anticoagulation I discussed this with him but he has agreed to have a loop recorder placed.  If the loop recorder is placed and he has recurrent atrial fibrillation he will be need to placed on anticoagulation.  If he does not we can stop the amiodarone and continue with aspirin alone.  He still is at risk for needing recurrent neck surgery so he will need Coumadin.  I have cleared this with neurosurgery Dr. Martin.     Asa 2, mall 2.. plan for loop recorder    NSVT  Short runs of NSVT were noted during recent hospitalization.  He is on amiodarone, beta-blockers and calcium channel blockers.  However, patient will call us back to confirm all his medications.  Recent electrolytes are within normal limits.  Continue present management.  He will eventually need a stress test.      Hypertension  The patient blood pressure is well controlled at this time.  The patient is on appropriate medical therapy for blood pressure and will continue to monitor at this time.  I discussed diet, exercise, weight loss and salt restriction.  Intermittent blood pressure checks at home would also be recommended and if there are significant elevations in blood pressure values the patient will call us for further recommendations.       Hyperlipidemia  LDL 86 from 11/2020. On statin therapy.  However, his pharmacy reported  that patient is not taking this right now.  As stated above, patient will call us back.  Advised to continue low-fat healthy heart diet.  Further recommendations to follow.     Retropharyngeal abscess  S/P I&D last 11/29/20 related from recent cervical spine surgery done by Dr. Martin from 11/5/20.  Appears to be resolved.  Neck incision also looks healed.  Continue management per the neurosurgery service/ID service.  He had an appointment with Dr. Martin and is doing better.      Bright red blood per rectum  Likely from constipation and straining.  No further bleeding per rectum.  Continue management per GI service.  They already okayed resuming AC.    For now isabel tart asa and hold on coumaidn..       Medications Discontinued During This Encounter   Medication Reason   • flecainide (TAMBOCOR) 100 MG tablet Discontinued by another Health Care Provider   • warfarin (COUMADIN) 3 MG tablet Discontinued by another Health Care Provider       Orders Placed This Encounter   • aspirin (ECOTRIN) 81 MG EC tablet         Return in about 4 weeks (around 2/10/2021).      A letter has been sent to referring physician.     On 1/13/2021, Ev PARKINSON scribed the services personally performed by MD Kaleigh Soni MD, [facility]  [facility]  [facility]

## 2022-01-22 NOTE — ED PROVIDER NOTES
[place] ENCOUNTER          Pt Name: Ney Reyez  MRN: [id number]  [place] 1970  Date of evaluation: 1/22/2022  Emergency Physician: Nellene Kayser, DO    CHIEF COMPLAINT     No chief complaint on file. History obtained from the patient. HISTORY OF PRESENT ILLNESS    HPI  Ney Reyez is a 46 y.o. male who presents to the emergency department for evaluation of cough and fever. Patient with 3 days onset of nasal congestion, runny nose, cough, and fever. He reports his cough has been progressively worsening this evening. Reports subjective fever taking Motrin at home. Body aches. States he has developed shortness of breath and difficulty breathing over the last day. He states his difficulty breathing is he attempts to take a deep breath and then coughs. States exposure to sick contacts. The patient has no other acute complaints at this time. REVIEW OF SYSTEMS   Review of Systems   Constitutional: Negative for activity change, chills and fever. HENT: Positive for congestion. Negative for sinus pressure and sore throat. Eyes: Negative for photophobia, redness and visual disturbance. Respiratory: Positive for cough and shortness of breath. Negative for chest tightness. Cardiovascular: Negative for chest pain, palpitations and leg swelling. Gastrointestinal: Positive for vomiting (3-4 times). Negative for abdominal pain and nausea. Endocrine: Negative for polydipsia and polyuria. Genitourinary: Negative for decreased urine volume, difficulty urinating, dysuria, flank pain, frequency and urgency. Musculoskeletal: Negative for back pain, myalgias and neck pain. Skin: Positive for wound (improving. following with wound care last today. Completed Vyvox 4 days ago). Negative for color change and rash. Neurological: Negative for weakness and headaches. Hematological: Negative for adenopathy.  Does not bruise/bleed easily. Psychiatric/Behavioral: Negative for confusion and self-injury. PAST MEDICAL AND SURGICAL HISTORY     Past Medical History:   Diagnosis Date    Atherosclerotic heart disease of native coronary artery with unspecified angina pectoris 1/18/2022    Bipolar 2 disorder [facility])     previously followed with Dr. Cate Waters and Flory Jaimes in [facility] BPH (benign prostatic hyperplasia)     COPD (chronic obstructive pulmonary disease) ([address]     Diabetes mellitus type 2, uncontrolled ([address]     HgbA1c on 4/2/2019 was 9.1.     Diabetic peripheral neuropathy (HCC)     Diabetic polyneuropathy ([address]     Diabetic ulcer of right foot associated with type 2 diabetes mellitus ([address] 12/10/2015    Essential hypertension     \"never been on b/p medication that I know of\"    GERD (gastroesophageal reflux disease)     Hammer toe of left foot     Heart murmur     denies any chest pain or palpitations    History of tobacco abuse     Hx of AKA (above knee amputation), right ([address] 04/18/2019    Dr. Krystyna Black    Hyperlipidemia     Macular edema, diabetic, bilateral ([address] 05/04/2018    Dr. Yg Conley referred to retina specialist for 2nd opinion    Marijuana abuse in remission     Melena     MRSA (methicillin resistant staph aureus) culture positive     Onychomycosis     Other disorders of kidney and ureter in diseases classified elsewhere     Sleep apnea     no cpap    Thyroid disease     WD-Skin ulcer of fourth toe of right foot with necrosis of bone ([address] 6/29/2016     Past Surgical History:   Procedure Laterality Date    ABDOMEN SURGERY      ABSCESS DRAINAGE Right     foot    AMPUTATION ABOVE KNEE Right 4/18/2019    RIGHT ABOVE KNEE AMPUTATION performed by David Cheng MD at [address] N/A 4/1/2020    ROBOTIC CHOLECYSTECTOMY performed by Marielos Zambrano MD at [address] N/A 7/20/2020    CYSTOSCOPY performed by Shira Champagne Jay Agee MD at [address] [facility] Prachi Sepulveda 8/21/2020    CYSTOSCOPY, UROLIFT performed by Kadie Knight MD at [address] [facility], ESOPHAGUS      ENDOSCOPY, COLON, DIAGNOSTIC      FOOT DEBRIDEMENT Right 07/01/2016    I & D    GASTROCNEMIUS RECESSION Left 11/12/2021    LEFT LEG GASTROCS RECESSION performed by Amelie Adair MD at [address] [facility] Right 2/18/2019    I&D RIGHT STUMP performed by Ashley Nunn MD at [address] Right 07/20/2016    LEG AMPUTATION BELOW KNEE Right 4/4/2019    I&D AND REVISION OF AMPUTATION RIGHT LEG performed by Ashley Nunn MD at [place] [address] Right 1/14/15    sole of foot I&D    FL DRAIN INFECT SHOULDER BURSA Left 8/18/2017    LEFT SHOULDER INCISION AND DRAINAGE performed by Ashley Nunn MD at [address] [facility] OFFICE/OUTPT [facility] Right 9/20/2018    EXCISIONAL DEBRIDEMENT RIGHT BKA STUMP performed by Felix Jones MD at [place] [address] Right 1/16/15    2nd toe with wound vac applied    UPPER GASTROINTESTINAL ENDOSCOPY N/A 3/3/2020    EGD DILATION SAVORY performed by Jomar Melton MD at [facility]  ? when         MEDICATIONS     Current Facility-Administered Medications:     0.9 % sodium chloride bolus, 1,000 mL, IntraVENous, Once, Haze Nipple, DO, Last Rate: 1,000 mL/hr at 01/22/22 0108, 1,000 mL at 01/22/22 0108    Current Outpatient Medications:     nitroGLYCERIN (NITROSTAT) 0.3 MG SL tablet, Place 1 tablet under the tongue every 5 minutes as needed for Chest pain up to max of 3 total doses. If no relief after 1 dose, call 911., Disp: 30 tablet, Rfl: 3    empagliflozin (JARDIANCE) 25 MG tablet, Take 1 tablet by mouth daily New dose, Disp: 30 tablet, Rfl: 5    gabapentin (NEURONTIN) 300 MG capsule, TAKE 1 CAPSULE BY MOUTH THREE TIMES DAILY. , Disp: 90 capsule, Rfl: 2    Insulin Glargine, 2 Unit Dial, 300 UNIT/ML SOPN, Change to 80 units BID and every 10 days increase by 5 units both times, to get am sugars 150-200, Disp: 15 pen, Rfl: 2    insulin lispro (HUMALOG KWIKPEN U-200) 200 UNIT/ML SOPN pen, 40 units Plus scale - Max dose 160 units per day, Disp: 30 pen, Rfl: 2    Insulin Pen Needle (PEN NEEDLES) 31G X 8 MM MISC, For use with insulin injections five timed per day dx:E11.9, Disp: 150 each, Rfl: 1    Calcium-Phosphorus-Vitamin D (CALCIUM/D3 ADULT GUMMIES PO), Take 2 tablets by mouth daily, Disp: , Rfl:     sertraline (ZOLOFT) 50 MG tablet, Take 50 mg by mouth daily, Disp: , Rfl:     atorvastatin (LIPITOR) 40 MG tablet, Take 1 tablet by mouth nightly, Disp: 30 tablet, Rfl: 3    fenofibrate (TRICOR) 145 MG tablet, Take 1 tablet by mouth daily, Disp: 30 tablet, Rfl: 3    levothyroxine (SYNTHROID) 50 MCG tablet, Take 1 tablet by mouth daily, Disp: 90 tablet, Rfl: 1    metoprolol tartrate (LOPRESSOR) 50 MG tablet, Take 1 tablet by mouth 2 times daily, Disp: 90 tablet, Rfl: 3    Continuous Blood Gluc Transmit (DEXCOM G6 TRANSMITTER) MISC, 1 Device by Does not apply route every 3 months, Disp: 1 each, Rfl: 3    Continuous Blood Gluc Sensor (DEXCOM G6 SENSOR) MISC, 1 Device by Does not apply route every 14 days, Disp: 9 each, Rfl: 3    Continuous Blood Gluc  (DEXCOM G6 ) LIANNE, 1 Device by Does not apply route 4 times daily (before meals and nightly), Disp: 1 Device, Rfl: 0    lisinopril (PRINIVIL;ZESTRIL) 5 MG tablet, Take 1 tablet by mouth daily, Disp: 90 tablet, Rfl: 1    ARIPiprazole (ABILIFY) 5 MG tablet, Take 5 mg by mouth daily , Disp: , Rfl:       SOCIAL HISTORY     Social History     Social History Narrative    Not on file     Social History     Tobacco Use    Smoking status: Current Some Day Smoker     Packs/day: 0.00     Years: 10.00     Pack years: 0.00     Types: Cigars     Start date: 1985     Last attempt to quit: 2000     Years since quittin.4    Smokeless tobacco: Never Used Vaping Use    Vaping Use: Former    Substances: Nicotine, Flavoring   Substance Use Topics    Alcohol use: Not Currently     Alcohol/week: 0.0 standard drinks    Drug use: No     Comment: 30 YEARS AGO         ALLERGIES     Allergies   Allergen Reactions    Pcn [Penicillins] Shortness Of Breath, Nausea And Vomiting and Other (See Comments)     Does not remember reactions. Has TOLERATED amoxicillin and several different cephalosporins.  Actos [Pioglitazone] Swelling    Clindamycin/Lincomycin Itching    Vancomycin Hives      Rash, with erythematous plaques to abdomen, shoulders, and proximal upper extremities with pruritis, persisted with 2nd dose administered slower.  Metformin And Related Swelling         FAMILY HISTORY     Family History   Problem Relation Age of Onset    Diabetes Mother     Other Mother         pneumonia, H1N1    Depression Mother     Early Death Mother     High Blood Pressure Mother     High Cholesterol Mother     Vision Loss Maternal Grandmother     Arthritis Maternal Grandfather     Heart Disease Maternal Grandfather          PHYSICAL EXAM     ED Triage Vitals [01/22/22 0020]   BP Temp Temp Source Pulse Resp SpO2 Height Weight   (!) 151/73 98.2 °F (36.8 °C) Oral 114 22 98 % 5' 6\" (1.676 m) 223 lb (101.2 kg)         Additional Vital Signs:  Vitals:    01/22/22 0108   BP: (!) 140/41   Pulse: 122   Resp: 22   Temp:    SpO2: 98%       Physical Exam  Vitals and nursing note reviewed. Constitutional:       General: He is not in acute distress. Appearance: He is well-developed. He is not diaphoretic. HENT:      Head: Normocephalic. Nose: Congestion and rhinorrhea present. Eyes:      Pupils: Pupils are equal, round, and reactive to light. Neck:      Vascular: No JVD. Cardiovascular:      Rate and Rhythm: Regular rhythm. Tachycardia present. Heart sounds: Normal heart sounds.    Pulmonary:      Effort: Pulmonary effort is normal.      Breath sounds: Normal breath sounds. Abdominal:      General: Bowel sounds are normal. There is no distension. Palpations: Abdomen is soft. Tenderness: There is no abdominal tenderness. Musculoskeletal:         General: Deformity (right AKA) present. No tenderness. Normal range of motion. Cervical back: Normal range of motion and neck supple. Comments: Left anterior lower extremity wound. No drainage. No purulence. Skin:     General: Skin is warm and dry. Capillary Refill: Capillary refill takes less than 2 seconds. Neurological:      Mental Status: He is alert and oriented to person, place, and time. Comments: Non-focal. Moves all extremities. Initial vital signs and nursing assessment reviewed and abnormal from Tachycardia. Pulsoximetry is normal per my interpretation. MEDICAL DECISION MAKING   Initial Assessment: Given the patient's above chief complaint and findings on history and physical examination, I thought it was appropriate to consider the following emergency medical conditions: COPD exacerbation, COVID-19, influenza, pneumonia, URI, dehydration, although some of these diagnoses are unlikely they were considered in my medical decision making.     Plan: CBC, BMP, ECG, chest x-ray COVID/influenza, COVID severity labs symptomatic treatment with IV hydration and reassess         ED RESULTS   Laboratory results:  Labs Reviewed   COVID-19, RAPID - Abnormal; Notable for the following components:       Result Value    SARS-CoV-2, NAAT DETECTED (*)     All other components within normal limits   CBC WITH AUTO DIFFERENTIAL - Abnormal; Notable for the following components:    RBC 4.66 (*)     Hematocrit 41.0 (*)     MPV 9.1 (*)     Lymphocytes Absolute 0.7 (*)     All other components within normal limits   BASIC METABOLIC PANEL - Abnormal; Notable for the following components:    Sodium 134 (*)     Chloride 96 (*)     Glucose 146 (*)     All other components within normal limits GLOMERULAR FILTRATION RATE, ESTIMATED - Abnormal; Notable for the following components:    Est, Glom Filt Rate 78 (*)     All other components within normal limits   OSMOLALITY - Abnormal; Notable for the following components:    Osmolality Calc 272.1 (*)     All other components within normal limits   RAPID INFLUENZA A/B ANTIGENS   CULTURE, BLOOD 2   CULTURE, BLOOD 1   ANION GAP   LACTATE, SEPSIS   LACTATE, SEPSIS   C-REACTIVE PROTEIN   FERRITIN   LACTATE DEHYDROGENASE   D-DIMER, QUANTITATIVE   PROCALCITONIN   TROPONIN   POCT GLUCOSE       Radiologic studies results:  XR CHEST PORTABLE   Final Result   Ill-defined opacity at the right medial lung base. Consider atelectasis or    developing consolidation. This document has been electronically signed by: Clive Benedict MD on 01/22/2022 01:06 AM          ED Medications administered this visit:   Medications   0.9 % sodium chloride bolus (1,000 mLs IntraVENous New Bag 1/22/22 0108)   acetaminophen (TYLENOL) tablet 1,000 mg (1,000 mg Oral Given 1/22/22 0125)         ED COURSE     ED Course as of 01/22/22 0639   Sat Jan 22, 2022   0235 SARS-CoV-2, NAAT(!!): DETECTED  Vaccinated and boosted [DD]   0236 Lactic Acid, Sepsis(!): 2.8 [DD]   0236 Pulse: 115 [DD]   7562 Discussed case with Dr. Sheridan Jalloh. He graciously excepted the admission. [DD]   0448 Troponin T(!): 0.011  Improved from prior ECG with likely rate related changes from dehydration. Patient with chronically elevated troponin within this range. [DD]   3242 Patient resting comfortably in room. Symptoms improved. He is not having chest pain shortness of breath or difficulty breathing currently. His heart rate is around 100. CTA chest is negative. Lactate reduced to 2.1. Discussed strict return precautions. Patient is agreeable with plan of care. [DD]   8236 CTA Chest W WO Contrast  CTA chest negative.  [DD]      ED Course User Index  [DD] Seth Jiang DO   Patient presented with signs and symptoms consistent with COVID-19. COVID-19 swab was positive. Chest x-ray with questionable atelectasis versus infiltrate. Patient tachycardia. Given IV hydration. Lactic acid resulted back at 2.8. Procalcitonin 0.14. COVID severity labs troponin 0.019. He has had chronic troponins elevated in this range. ECG with sinus tachycardia ST depressions in lateral leads with upsloping likely rate related changes. Consulted hospitalist Dr. Yumiko Thomas. Patient deferred admission at this time. Plan to continue IV hydration and re-check troponin and Lactate. Patient's labs then improved and patient feeling better post IV hydration. Discussed strict return precautions patient is agreeable. Patient to start oral COVID therapy with molnuapiravir. Discussed risk and benefits of medication patient is agreeable. Continue zinc vitamin C and home pulse ox monitoring. The diagnosis, extensive differential diagnosis, laboratory and imaging findings were discussed at the bedside. The patient was an active participant in their care. They are agreeable to the plan of care. All questions and concerns were addressed at the time of the encounter. MEDICATION CHANGES     DISCHARGE MEDICATIONS:  Discharge Medication List as of 1/22/2022  6:20 AM      START taking these medications    Details   zinc sulfate (ZINCATE) 220 (50 Zn) MG capsule Take 1 capsule by mouth daily for 7 days, Disp-7 capsule, R-0Normal      ascorbic acid (VITAMIN C) 500 MG tablet Take 1 tablet by mouth 2 times daily for 7 days, Disp-14 tablet, R-0Normal                  FINAL DISPOSITION     Final diagnoses:   COVID-19   Elevated troponin   Lactic acidosis     Condition: condition: Good  Dispo: Discharge home. PATIENT REFERRED TO:  Gloria Arriaga MD  [address] [place] [address]  [phone]    Call in 2 days  for a virtual visit this week      Critical Care Time   None      This transcription was electronically signed.  Parts of this transcriptions may have been dictated by use of voice recognition software and electronically transcribed, and parts may have been transcribed with the assistance of an ED scribe. The transcription may contain errors not detected in proofreading.     Electronically Signed: Fallon Henriquez DO, 01/22/22, 1:39 AM      Fallon Henriquez DO  01/22/22 [address] [place] [address],   01/22/22 2188

## 2022-01-22 NOTE — ED NOTES
Bed: 021A  Expected date:   Expected time:   Means of arrival:   Comments:  jericho Trujillo  01/22/22 0019

## 2022-01-22 NOTE — ED TRIAGE NOTES
Pt to rm 21 per EMS c/o increased SOB, persistent cough, chills and concern for covid. States he started feeling bad a couple days ago but it got worse today. Denies chest pain, no recent n/v/d reported. States his BS has been elevated recently as well.

## 2022-01-24 ENCOUNTER — CARE COORDINATION (OUTPATIENT)
Dept: CASE MANAGEMENT | Age: 52
End: 2022-01-24

## 2022-01-24 ENCOUNTER — TELEPHONE (OUTPATIENT)
Dept: INTERNAL MEDICINE CLINIC | Age: 52
End: 2022-01-24

## 2022-01-24 NOTE — TELEPHONE ENCOUNTER
Pt. Called concerning Covid-19. Pt. States he was tested at ER and was positive. However, Pt. States yesterday he was running a pulse around the 40s. He collected this via a Pulse Ox. He said today it has been in the 80s. I informed the Pt. That he will need to go to the ER if his pulse gets that low again. Pt. Stated understanding and had no further questions.

## 2022-01-24 NOTE — CARE COORDINATION
Transitions of Care Call  Call within 2 business days of discharge: Yes    Patient: Reji Talavera Patient : 1970   MRN: [id number]  Reason for Admission: sob  Discharge Date: 22 RARS: Readmission Risk Score: 20.4 ( )      Last Discharge [facility]       Complaint Diagnosis Description Type Department Provider    22 Shortness of Breath  COVID-19 . .. ED (DISCHARGE) BLANK Rodriguez DO            Challenges to be reviewed by the provider   Additional needs identified to be addressed with provider: No  needs VV in 2 days                 Encounter was not routed to provider for escalation. Method of communication with provider: none. Discussed COVID-19 related testing which was: available at this time. Test results were: positive. Patient informed of results, if available? Yes. Current Symptoms: cough, no new symptoms and no worsening symptoms    Reviewed New or Changed Meds: yes    Do you have what you need at home?  Durable Medical Equipment ordered at discharge: None   Home Health/Outpatient orders at discharge: none   Was patient discharged with a pulse oximeter? Yes Discussed and confirmed pulse oximeter discharge instructions and when to notify provider or seek emergency care. Patient education provided: Reviewed appropriate site of care based on symptoms and resources available to patient including: PCP, Specialist and Home health. Follow up appointment scheduled within 7 days of discharge: yes.  If no appointment scheduled, scheduling offered: yes  Future Appointments   Date Time Provider Ebenezer Carmichael   2022 10:00 AM [address] [place] [address] [address] [facility]   2022 12:45 PM MD JULIAN Velasquez [facility] - [place]   2022  3:30 PM Adalgisa Manriquez RD, LD [facility] - [place]   3/22/2022 11:00 AM Claudine Espinoza   2022  9:45 AM MD LEILA Case [facility] - [place]       Interventions: Scheduled appointment with PCP- pt. to schedule  Scheduled appointment with Specialist-1/27/22  Reviewed discharge instructions, medical action plan and red flags with patient who verbalized understanding. Plan for follow-up call in 5-7 days based on severity of symptoms and risk factors. Plan for next call: symptom management-cough, pO2%, wound care, FBS  follow up appointment-PCP VV? Provided contact information for future needs.     Osvaldo Camejo RN

## 2022-01-25 ENCOUNTER — HOSPITAL ENCOUNTER (EMERGENCY)
Age: 52
Discharge: HOME | End: 2022-01-25
Attending: EMERGENCY MEDICINE
Payer: MEDICARE

## 2022-01-25 ENCOUNTER — TELEPHONE (OUTPATIENT)
Dept: INTERNAL MEDICINE CLINIC | Age: 52
End: 2022-01-25

## 2022-01-25 ENCOUNTER — APPOINTMENT (OUTPATIENT)
Dept: GENERAL RADIOLOGY | Age: 52
End: 2022-01-25
Payer: MEDICARE

## 2022-01-25 VITALS
TEMPERATURE: 98 F | OXYGEN SATURATION: 96 % | RESPIRATION RATE: 15 BRPM | DIASTOLIC BLOOD PRESSURE: 86 MMHG | SYSTOLIC BLOOD PRESSURE: 164 MMHG | HEART RATE: 88 BPM

## 2022-01-25 DIAGNOSIS — F41.1 ANXIETY STATE: Primary | ICD-10-CM

## 2022-01-25 LAB
ALBUMIN SERPL-MCNC: 3.7 G/DL (ref 3.5–5.1)
ALP BLD-CCNC: 68 U/L (ref 38–126)
ALT SERPL-CCNC: 28 U/L (ref 11–66)
ANION GAP SERPL CALCULATED.3IONS-SCNC: 14 MEQ/L (ref 8–16)
AST SERPL-CCNC: 46 U/L (ref 5–40)
BASOPHILS # BLD: 0.4 %
BASOPHILS ABSOLUTE: 0 THOU/MM3 (ref 0–0.1)
BILIRUB SERPL-MCNC: 0.4 MG/DL (ref 0.3–1.2)
BILIRUBIN DIRECT: < 0.2 MG/DL (ref 0–0.3)
BUN BLDV-MCNC: 10 MG/DL (ref 7–22)
CALCIUM SERPL-MCNC: 8.5 MG/DL (ref 8.5–10.5)
CHLORIDE BLD-SCNC: 96 MEQ/L (ref 98–111)
CO2: 22 MEQ/L (ref 23–33)
CREAT SERPL-MCNC: 0.8 MG/DL (ref 0.4–1.2)
EOSINOPHIL # BLD: 6.3 %
EOSINOPHILS ABSOLUTE: 0.5 THOU/MM3 (ref 0–0.4)
ERYTHROCYTE [DISTWIDTH] IN BLOOD BY AUTOMATED COUNT: 13.7 % (ref 11.5–14.5)
ERYTHROCYTE [DISTWIDTH] IN BLOOD BY AUTOMATED COUNT: 45.1 FL (ref 35–45)
GFR SERPL CREATININE-BSD FRML MDRD: > 90 ML/MIN/1.73M2
GLUCOSE BLD-MCNC: 276 MG/DL (ref 70–108)
HCT VFR BLD CALC: 39.9 % (ref 42–52)
HEMOGLOBIN: 13.6 GM/DL (ref 14–18)
IMMATURE GRANS (ABS): 0.03 THOU/MM3 (ref 0–0.07)
IMMATURE GRANULOCYTES: 0.4 %
LIPASE: 26 U/L (ref 5.6–51.3)
LYMPHOCYTES # BLD: 32.6 %
LYMPHOCYTES ABSOLUTE: 2.5 THOU/MM3 (ref 1–4.8)
MAGNESIUM: 2.1 MG/DL (ref 1.6–2.4)
MCH RBC QN AUTO: 30.7 PG (ref 26–33)
MCHC RBC AUTO-ENTMCNC: 34.1 GM/DL (ref 32.2–35.5)
MCV RBC AUTO: 90.1 FL (ref 80–94)
MONOCYTES # BLD: 8 %
MONOCYTES ABSOLUTE: 0.6 THOU/MM3 (ref 0.4–1.3)
NUCLEATED RED BLOOD CELLS: 0 /100 WBC
OSMOLALITY CALCULATION: 273.4 MOSMOL/KG (ref 275–300)
PLATELET # BLD: 222 THOU/MM3 (ref 130–400)
PMV BLD AUTO: 9 FL (ref 9.4–12.4)
POTASSIUM SERPL-SCNC: 5 MEQ/L (ref 3.5–5.2)
PRO-BNP: 21.3 PG/ML (ref 0–900)
RBC # BLD: 4.43 MILL/MM3 (ref 4.7–6.1)
SEG NEUTROPHILS: 52.3 %
SEGMENTED NEUTROPHILS ABSOLUTE COUNT: 4.1 THOU/MM3 (ref 1.8–7.7)
SODIUM BLD-SCNC: 132 MEQ/L (ref 135–145)
TOTAL PROTEIN: 7.3 G/DL (ref 6.1–8)
TROPONIN T: < 0.01 NG/ML
WBC # BLD: 7.8 THOU/MM3 (ref 4.8–10.8)

## 2022-01-25 PROCEDURE — 83880 ASSAY OF NATRIURETIC PEPTIDE: CPT

## 2022-01-25 PROCEDURE — 84484 ASSAY OF TROPONIN QUANT: CPT

## 2022-01-25 PROCEDURE — 82248 BILIRUBIN DIRECT: CPT

## 2022-01-25 PROCEDURE — 85025 COMPLETE CBC W/AUTO DIFF WBC: CPT

## 2022-01-25 PROCEDURE — 83690 ASSAY OF LIPASE: CPT

## 2022-01-25 PROCEDURE — 80053 COMPREHEN METABOLIC PANEL: CPT

## 2022-01-25 PROCEDURE — 93005 ELECTROCARDIOGRAM TRACING: CPT | Performed by: EMERGENCY MEDICINE

## 2022-01-25 PROCEDURE — 71045 X-RAY EXAM CHEST 1 VIEW: CPT

## 2022-01-25 PROCEDURE — 83735 ASSAY OF MAGNESIUM: CPT

## 2022-01-25 PROCEDURE — 99285 EMERGENCY DEPT VISIT HI MDM: CPT

## 2022-01-26 ENCOUNTER — CARE COORDINATION (OUTPATIENT)
Dept: CASE MANAGEMENT | Age: 52
End: 2022-01-26

## 2022-01-26 LAB
EKG ATRIAL RATE: 86 BPM
EKG P AXIS: 35 DEGREES
EKG P-R INTERVAL: 168 MS
EKG Q-T INTERVAL: 350 MS
EKG QRS DURATION: 80 MS
EKG QTC CALCULATION (BAZETT): 418 MS
EKG R AXIS: 10 DEGREES
EKG T AXIS: 61 DEGREES
EKG VENTRICULAR RATE: 86 BPM

## 2022-01-26 PROCEDURE — 93010 ELECTROCARDIOGRAM REPORT: CPT | Performed by: INTERNAL MEDICINE

## 2022-01-26 NOTE — ED TRIAGE NOTES
Pt presents to the ED via EMS with c/o elevated heart rate. Pt states his heart rate was fluctuating at home when he checked it with his home pulse oximeter. Pt denies chest pain or palpitations. Pt states he tested positive for COVID on 1/22/22.  VSS, respirations even and unlabored

## 2022-01-26 NOTE — ED PROVIDER NOTES
[address] ENCOUNTER      PATIENT NAME: Dioni Hylton  MRN: [id number]  : 1970  ROCHE: 2022  PROVIDER: Meghna Mabry MD      CHIEF COMPLAINT       Chief Complaint   Patient presents with    Positive For Covid-19    Other     tachycardia at home       Patient is seen and evaluated in a timely fashion. Nurses Notes are reviewed and I agree except as noted in the HPI. HISTORY OF PRESENT ILLNESS    Dioni Hylton is a 45 y.o. male who presents to Emergency Department with Positive For Covid-19 and Other (tachycardia at home)     This patient is a 58-year-old male presented to ED for evaluation of arrhythmia. Patient checked his heart rate at home today ranging from 60/minute-80/minute and he became nervous. He called EMS, on arrival, patient is asymptomatic. Patient was tested positive for COVID-19 on 2022. This HPI was provided by patient. REVIEW OF SYSTEMS   Ten-point review of systems is negative except those documented in above HPI including constitutional, HEENT, respiratory, cardiovascular, gastrointestinal, genitourinary, musculoskeletal, skin, neurological, hematological and behavioral.      PAST MEDICAL HISTORY     Past Medical History:   Diagnosis Date    Atherosclerotic heart disease of native coronary artery with unspecified angina pectoris 2022    Bipolar 2 disorder ([address]     previously followed with Dr. Darlyn Han and Naomi Frias in [place] [address] BPH (benign prostatic hyperplasia)     COPD (chronic obstructive pulmonary disease) ([address]     Diabetes mellitus type 2, uncontrolled ([address]     HgbA1c on 2019 was 9.1.     Diabetic peripheral neuropathy ([address]     Diabetic polyneuropathy ([address]     Diabetic ulcer of right foot associated with type 2 diabetes mellitus ([address] 12/10/2015    Essential hypertension     \"never been on b/p medication that I know of\"    GERD (gastroesophageal reflux disease)     Hammer toe of left foot     Heart murmur     denies any chest pain or palpitations    History of tobacco abuse     Hx of AKA (above knee amputation), right ([address] 04/18/2019    Dr. Dede Jack Hyperlipidemia     Macular edema, diabetic, bilateral ([address] 05/04/2018    Dr. Laura Murray referred to retina specialist for 2nd opinion    Marijuana abuse in remission     Melena     MRSA (methicillin resistant staph aureus) culture positive     Onychomycosis     Other disorders of kidney and ureter in diseases classified elsewhere     Sleep apnea     no cpap    Thyroid disease     WD-Skin ulcer of fourth toe of right foot with necrosis of bone ([facility] 6/29/2016       SURGICAL HISTORY       Past Surgical History:   Procedure Laterality Date    ABDOMEN SURGERY      ABSCESS DRAINAGE Right     foot    AMPUTATION ABOVE KNEE Right 4/18/2019    RIGHT ABOVE KNEE AMPUTATION performed by Jesu Wade MD at [id number], LAPAROSCOPIC N/A 4/1/2020    ROBOTIC CHOLECYSTECTOMY performed by Raymon Pizano MD at [address] N/A 7/20/2020    CYSTOSCOPY performed by Penny Peña MD at [address] [facility] Tr[facility] 8/21/2020    CYSTOSCOPY, UROLIFT performed by Penny Peña MD at [facility], ESOPHAGUS      ENDOSCOPY, COLON, DIAGNOSTIC      FOOT DEBRIDEMENT Right 07/01/2016    I & D    GASTROCNEMIUS RECESSION Left 11/12/2021    LEFT LEG GASTROCS RECESSION performed by Tania You MD at [address] [facility] [facility] Right 2/18/2019    I&D RIGHT STUMP performed by Jesu Wade MD at [address] [facility] Right 07/20/2016    LEG AMPUTATION BELOW KNEE Right 4/4/2019    I&D AND REVISION OF AMPUTATION RIGHT LEG performed by Jesu Wade MD at [address] Right 1/14/15    sole of foot I&D    [place] DRAIN INFECT SHOULDER BURSA Left 8/18/2017    LEFT SHOULDER INCISION AND DRAINAGE performed by Jesu Wade MD at [facility]    AR OFFICE/OUTPT VISIT,PROCEDURE ONLY Right 9/20/2018    EXCISIONAL DEBRIDEMENT RIGHT BKA STUMP performed by Getachew Marino MD at [place] [address] Right 1/16/15    2nd toe with wound vac applied    UPPER GASTROINTESTINAL ENDOSCOPY N/A 3/3/2020    EGD DILATION SAVORY performed by Nohelia Steven MD at [address] [place] [address]  ? when       CURRENT MEDICATIONS       Discharge Medication List as of 1/25/2022 10:14 PM      CONTINUE these medications which have NOT CHANGED    Details   zinc sulfate (ZINCATE) 220 (50 Zn) MG capsule Take 1 capsule by mouth daily for 7 days, Disp-7 capsule, R-0Normal      ascorbic acid (VITAMIN C) 500 MG tablet Take 1 tablet by mouth 2 times daily for 7 days, Disp-14 tablet, R-0Normal      nitroGLYCERIN (NITROSTAT) 0.3 MG SL tablet Place 1 tablet under the tongue every 5 minutes as needed for Chest pain up to max of 3 total doses. If no relief after 1 dose, call 911., Disp-30 tablet, R-3Normal      empagliflozin (JARDIANCE) 25 MG tablet Take 1 tablet by mouth daily New dose, Disp-30 tablet, R-5Normal      gabapentin (NEURONTIN) 300 MG capsule TAKE 1 CAPSULE BY MOUTH THREE TIMES DAILY. , Disp-90 capsule, R-2Normal      Insulin Glargine, 2 Unit Dial, 300 UNIT/ML SOPN Change to 80 units BID and every 10 days increase by 5 units both times, to get am sugars 150-200, Disp-15 pen, R-2Normal      insulin lispro (HUMALOG KWIKPEN U-200) 200 UNIT/ML SOPN pen 40 units Plus scale - Max dose 160 units per day, Disp-30 pen, R-2Normal      Insulin Pen Needle (PEN NEEDLES) 31G X 8 MM MISC Disp-150 each, R-1, NormalFor use with insulin injections five timed per day dx:E11.9      Calcium-Phosphorus-Vitamin D (CALCIUM/D3 ADULT GUMMIES PO) Take 2 tablets by mouth dailyHistorical Med      sertraline (ZOLOFT) 50 MG tablet Take 50 mg by mouth dailyHistorical Med      atorvastatin (LIPITOR) 40 MG tablet Take 1 tablet by mouth nightly, Disp-30 tablet, R-3Normal      fenofibrate (TRICOR) 145 MG tablet Take 1 tablet by mouth daily, Disp-30 tablet, R-3Normal      levothyroxine (SYNTHROID) 50 MCG tablet Take 1 tablet by mouth daily, Disp-90 tablet, R-1Normal      metoprolol tartrate (LOPRESSOR) 50 MG tablet Take 1 tablet by mouth 2 times daily, Disp-90 tablet, R-3Normal      Continuous Blood Gluc Transmit (DEXCOM G6 TRANSMITTER) MISC 1 Device by Does not apply route every 3 months, Disp-1 each, R-3Normal      Continuous Blood Gluc Sensor (DEXCOM G6 SENSOR) MISC 1 Device by Does not apply route every 14 days, Disp-9 each, R-3Normal      Continuous Blood Gluc  (DEXCOM G6 ) LIANNE 1 Device by Does not apply route 4 times daily (before meals and nightly), Disp-1 Device, R-0Normal      lisinopril (PRINIVIL;ZESTRIL) 5 MG tablet Take 1 tablet by mouth daily, Disp-90 tablet, R-1Normal      ARIPiprazole (ABILIFY) 5 MG tablet Take 5 mg by mouth daily Historical Med             ALLERGIES     Pcn [penicillins], Actos [pioglitazone], Clindamycin/lincomycin, Vancomycin, and Metformin and related    FAMILY HISTORY     He indicated that his mother is . He indicated that the status of his father is unknown and reported the following: unknown. He indicated that the status of his maternal grandmother is unknown. He indicated that the status of his maternal grandfather is unknown.   family history includes Arthritis in his maternal grandfather; Depression in his mother; Diabetes in his mother; Early Death in his mother; Heart Disease in his maternal grandfather; High Blood Pressure in his mother; High Cholesterol in his mother; Other in his mother; Vision Loss in his maternal grandmother. SOCIAL HISTORY      reports that he has been smoking cigars. He started smoking about 37 years ago. He has been smoking about 0.00 packs per day for the past 10.00 years. He has never used smokeless tobacco. He reports previous alcohol use. He reports that he does not use drugs.     PHYSICAL EXAM temperature is 98 °F (36.7 °C). His blood pressure is 164/86 (abnormal) and his pulse is 88. His respiration is 15 and oxygen saturation is 96%. Physical Exam  Vitals and nursing note reviewed. Constitutional:       General: He is not in acute distress. Appearance: He is well-developed. He is not diaphoretic. HENT:      Head: Normocephalic. Nose: Congestion and rhinorrhea present. Eyes:      Pupils: Pupils are equal, round, and reactive to light. Neck:      Vascular: No JVD. Cardiovascular:      Rate and Rhythm: Regular rhythm. Heart sounds: Normal heart sounds. Pulmonary:      Effort: Pulmonary effort is normal.      Breath sounds: Normal breath sounds. Abdominal:      General: Bowel sounds are normal. There is no distension. Palpations: Abdomen is soft. Tenderness: There is no abdominal tenderness. Musculoskeletal:         General: Deformity (right AKA) present. No tenderness. Normal range of motion. Cervical back: Normal range of motion and neck supple. Comments: Left anterior lower extremity wound. No drainage. No purulence. Skin:     General: Skin is warm and dry. Capillary Refill: Capillary refill takes less than 2 seconds. Neurological:      Mental Status: He is alert and oriented to person, place, and time. Comments: Non-focal. Moves all extremities.           Initial vital signs and nursing assessment reviewed and abnormal from Tachycardia. Pulsoximetry is normal per my interpretation. ANCILLARY TEST RESULTS   EKG:    Interpreted by me  Normal sinus rhythm, ventricular rate 86 bpm, MT interval 1 6 8 ms, QRS duration 80 ms,  ms, no ST elevation or acute T wave    LAB RESULTS:  Results for orders placed or performed during the hospital encounter of 01/25/22   CBC auto differential   Result Value Ref Range    WBC 7.8 4.8 - 10.8 thou/mm3    RBC 4.43 (L) 4.70 - 6.10 mill/mm3    Hemoglobin 13.6 (L) 14.0 - 18.0 gm/dl    Hematocrit 39.9 (L) on    01/25/2022 10:15 PM          ED COURSE AND MEDICAL DEDISION MAKING (MDM)     Differential Diagnosis: Anxiety, bronchitis,    Initial Plans: ECG, reassurance    EKG has no acute ischemic changes, no arrhythmia, normal sinus rhythm. Labs are reassuring. Patient reassured and discharged with PCP follow-up as scheduled. ED Medications  Medications - No data to display  Vital signs in ED  Vitals:    01/25/22 2122 01/25/22 2123 01/25/22 2205   BP:  (!) 177/69 (!) 164/86   Pulse: 89 89 88   Resp:  15 15   Temp:  98 °F (36.7 °C)    SpO2:  100% 96%       CRITICAL CARE   None    CONSULTS   None    PROCEDURES   None    FINAL IMPRESSION AND DISPOSITION      1.  Anxiety state            PATIENT REFERRED TO:  Tracy Poole MD  [address] [place] [address] [phone]    In 1 week  ED discharge follow up      [address]:  Discharge Medication List as of 1/25/2022 10:14 PM          (Please note that portions of this note were completed with a voice recognition program.  Efforts were made to edit the dictations but occasionally words aremis-transcribed.)    MD Racheal Ramos MD  01/25/22 7164

## 2022-01-26 NOTE — ED NOTES
Pt updated on plan of care. Call light in reach. Pt unable to ambulate due to only has 1 leg and did not bring his prosthesis. Dr. Juan Francisco Wright notified.      Estefania Anderson RN  01/25/22 [address] [facility]  01/25/22 7060

## 2022-01-26 NOTE — CARE COORDINATION
[facility] Transitions Follow Up Call    2022    Patient: Gene Liao  Patient : 1970   MRN: [id number]  Reason for Admission: L leg swelling  Discharge Date: 22 RARS: Readmission Risk Score: 20.4 ( )         Spoke with: Summer Loud today to f/u ED visit yesterday because he was worried about his heart rate being erratic. PO2=96% pulse 60-80 bpm. Denies chest pain/n/v/d/fever. He has a call in to his PCP for f/u visit. 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 Today is 5 days since dx and he feels ok. CTN advised him to wear a mask for 5 more days according to the CDC guidelines. He will talk to PCP about this. No other concerns at this time. Care Transitions Subsequent and Final Call    Subsequent and Final Calls  Do you currently have any active services?: Yes  Are you currently active with any services?:  [facility] Transitions Interventions  No Identified Needs  Other Interventions:       Care Transitions Follow Up Call    Needs to be reviewed by the provider   Additional needs identified to be addressed with provider: No  none             Method of communication with provider : none      Care Transition Nurse (CTN) contacted the patient by telephone to follow up after admission on 22. Verified name and  with patient as identifiers. Addressed changes since last contact: none  Discussed follow-up appointments. If no appointment was previously scheduled, appointment scheduling offered: Yes. Is follow up appointment scheduled within 7 days of discharge? Yes. Advance Care Planning:   Does patient have an Advance Directive: on file. CTN reviewed discharge instructions, medical action plan and red flags with patient and discussed any barriers to care and/or understanding of plan of care after discharge. Discussed appropriate site of care based on symptoms and resources available to patient including: PCP.  The patient agrees to contact the PCP office for questions related to their healthcare. Patients top risk factors for readmission: functional physical ability  lack of knowledge about disease  medical condition-Covid-19, DM, L leg cellulitis, HTN  multiple health system providers  Interventions to address risk factors: Scheduled appointment with PCP-PCP calling pt. w/ appt. Non-[facility] follow up appointment(s):     CTN provided contact information for future needs. Plan for follow-up call in 5-7 days based on severity of symptoms and risk factors.   Plan for next call: symptom management-cough, wounnd LLE, FBS  follow up appointment-PCP          Follow Up  Future Appointments   Date Time Provider Ebenezer Carmichael   2/10/2022 10:00  [facility], [address] [facility]   2/14/2022 12:45 PM MD JULIAN Adrian [facility]   2/21/2022  3:30 PM Hakeem Scott RD, LD [facility]   3/22/2022 11:00 AM NOELLE [facility]ell [facility] [address]   4/27/2022  9:45 AM Kassie Soriano MD [address] [place] [address]       Mary Oro RN

## 2022-01-27 LAB
BLOOD CULTURE, ROUTINE: NORMAL
BLOOD CULTURE, ROUTINE: NORMAL

## 2022-01-28 ENCOUNTER — HOSPITAL ENCOUNTER (EMERGENCY)
Age: 52
Discharge: HOME | End: 2022-01-28
Payer: MEDICARE

## 2022-01-28 VITALS
RESPIRATION RATE: 16 BRPM | SYSTOLIC BLOOD PRESSURE: 150 MMHG | TEMPERATURE: 98.7 F | OXYGEN SATURATION: 95 % | DIASTOLIC BLOOD PRESSURE: 68 MMHG | HEART RATE: 90 BPM

## 2022-01-28 DIAGNOSIS — U07.1 COVID-19: Primary | ICD-10-CM

## 2022-01-28 LAB — SARS-COV-2, NAAT: DETECTED

## 2022-01-28 PROCEDURE — 99281 EMR DPT VST MAYX REQ PHY/QHP: CPT

## 2022-01-28 PROCEDURE — 87635 SARS-COV-2 COVID-19 AMP PRB: CPT

## 2022-01-28 ASSESSMENT — ENCOUNTER SYMPTOMS
SINUS PAIN: 0
NAUSEA: 0
DIARRHEA: 0
COUGH: 0
ABDOMINAL PAIN: 0
CONSTIPATION: 0
VOMITING: 0
EYE PAIN: 0
SINUS PRESSURE: 0
SORE THROAT: 0
BLOOD IN STOOL: 0
ABDOMINAL DISTENTION: 0
SHORTNESS OF BREATH: 0

## 2022-01-28 NOTE — ED PROVIDER NOTES
[facility] [address] COMPLAINT       Chief Complaint   Patient presents with    Covid Testing       Nurses Notes reviewed and I agree except as notedin the HPI. HISTORY OF PRESENT ILLNESS    Felicita Abdullahi is a 45 y.o. male who presents tested positive for Covid last week. He states that his facility states that asked him to get retested today. He does not have any symptoms. Denies any runny nose cough sore throat he does simply wants another test.      REVIEW OF SYSTEMS     Review of Systems   Constitutional: Negative for chills and fever. HENT: Negative for congestion, ear pain, sinus pressure, sinus pain and sore throat. Eyes: Negative for pain. Respiratory: Negative for cough and shortness of breath. Cardiovascular: Negative for chest pain and leg swelling. Gastrointestinal: Negative for abdominal distention, abdominal pain, blood in stool, constipation, diarrhea, nausea and vomiting. Genitourinary: Negative for difficulty urinating, frequency and hematuria. Musculoskeletal: Negative for arthralgias. Skin: Negative for rash. Neurological: Negative for dizziness and headaches. All other systems reviewed and are negative.        PAST MEDICAL HISTORY    has a past medical history of Atherosclerotic heart disease of native coronary artery with unspecified angina pectoris, Bipolar 2 disorder ([address] BPH (benign prostatic hyperplasia), COPD (chronic obstructive pulmonary disease) ([address] Diabetes mellitus type 2, uncontrolled ([address] Diabetic peripheral neuropathy ([address] Diabetic polyneuropathy ([address] Diabetic ulcer of right foot associated with type 2 diabetes mellitus ([address] Essential hypertension, GERD (gastroesophageal reflux disease), Hammer toe of left foot, Heart murmur, History of tobacco abuse, Hx of AKA (above knee amputation), right ([address] Hyperlipidemia, Macular edema, diabetic, bilateral ([address] Marijuana abuse in remission, Melena, MRSA (methicillin resistant staph aureus) culture positive, Onychomycosis, Other disorders of kidney and ureter in diseases classified elsewhere, Sleep apnea, Thyroid disease, and WD-Skin ulcer of fourth toe of right foot with necrosis of bone ([address] SURGICAL HISTORY      has a past surgical history that includes other surgical history (Right, 1/14/15); Abscess Drainage (Right); Toe amputation (Right, 1/16/15); Foot Debridement (Right, 07/01/2016); Leg amputation below knee (Right, 07/20/2016); [place] tooth extraction (?when); pr drain infect shoulder bursa (Left, 8/18/2017); pr office/outpt visit,procedure only (Right, 9/20/2018); incision and drainage (Right, 2/18/2019); Leg amputation below knee (Right, 4/4/2019); AMPUTATION ABOVE KNEE (Right, 4/18/2019); Upper gastrointestinal endoscopy (N/A, 3/3/2020); Cholecystectomy, laparoscopic (N/A, 4/1/2020); Endoscopy, colon, diagnostic; Cystoscopy (N/A, 7/20/2020); Cystoscopy (N/A, 8/21/2020); Gastrocnemius Recession (Left, 11/12/2021); Abdomen surgery; Colonoscopy; and Dilatation, esophagus. CURRENT MEDICATIONS       Discharge Medication List as of 1/28/2022 12:28 PM      CONTINUE these medications which have NOT CHANGED    Details   zinc sulfate (ZINCATE) 220 (50 Zn) MG capsule Take 1 capsule by mouth daily for 7 days, Disp-7 capsule, R-0Normal      ascorbic acid (VITAMIN C) 500 MG tablet Take 1 tablet by mouth 2 times daily for 7 days, Disp-14 tablet, R-0Normal      nitroGLYCERIN (NITROSTAT) 0.3 MG SL tablet Place 1 tablet under the tongue every 5 minutes as needed for Chest pain up to max of 3 total doses. If no relief after 1 dose, call 911., Disp-30 tablet, R-3Normal      empagliflozin (JARDIANCE) 25 MG tablet Take 1 tablet by mouth daily New dose, Disp-30 tablet, R-5Normal      gabapentin (NEURONTIN) 300 MG capsule TAKE 1 CAPSULE BY MOUTH THREE TIMES DAILY. , Disp-90 capsule, R-2Normal      Insulin Glargine, 2 Unit Dial, 300 UNIT/ML SOPN Change to 80 units BID and every 10 days increase by 5 units both times, to get am sugars 150-200, Disp-15 pen, R-2Normal      insulin lispro (HUMALOG KWIKPEN U-200) 200 UNIT/ML SOPN pen 40 units Plus scale - Max dose 160 units per day, Disp-30 pen, R-2Normal      Insulin Pen Needle (PEN NEEDLES) 31G X 8 MM MISC Disp-150 each, R-1, NormalFor use with insulin injections five timed per day dx:E11.9      Calcium-Phosphorus-Vitamin D (CALCIUM/D3 ADULT GUMMIES PO) Take 2 tablets by mouth dailyHistorical Med      sertraline (ZOLOFT) 50 MG tablet Take 50 mg by mouth dailyHistorical Med      atorvastatin (LIPITOR) 40 MG tablet Take 1 tablet by mouth nightly, Disp-30 tablet, R-3Normal      fenofibrate (TRICOR) 145 MG tablet Take 1 tablet by mouth daily, Disp-30 tablet, R-3Normal      levothyroxine (SYNTHROID) 50 MCG tablet Take 1 tablet by mouth daily, Disp-90 tablet, R-1Normal      metoprolol tartrate (LOPRESSOR) 50 MG tablet Take 1 tablet by mouth 2 times daily, Disp-90 tablet, R-3Normal      Continuous Blood Gluc Transmit (DEXCOM G6 TRANSMITTER) MISC 1 Device by Does not apply route every 3 months, Disp-1 each, R-3Normal      Continuous Blood Gluc Sensor (DEXCOM G6 SENSOR) MISC 1 Device by Does not apply route every 14 days, Disp-9 each, R-3Normal      Continuous Blood Gluc  (DEXCOM G6 ) LIANNE 1 Device by Does not apply route 4 times daily (before meals and nightly), Disp-1 Device, R-0Normal      lisinopril (PRINIVIL;ZESTRIL) 5 MG tablet Take 1 tablet by mouth daily, Disp-90 tablet, R-1Normal      ARIPiprazole (ABILIFY) 5 MG tablet Take 5 mg by mouth daily Historical Med             ALLERGIES     is allergic to pcn [penicillins], actos [pioglitazone], clindamycin/lincomycin, vancomycin, and metformin and related. HISTORY     He indicated that his mother is . He indicated that the status of his father is unknown and reported the following: unknown.  He indicated that the status of his maternal grandmother is unknown. He indicated that the status of his maternal grandfather is unknown.   family history includes Arthritis in his maternal grandfather; Depression in his mother; Diabetes in his mother; Early Death in his mother; Heart Disease in his maternal grandfather; High Blood Pressure in his mother; High Cholesterol in his mother; Other in his mother; Vision Loss in his maternal grandmother. SOCIALHISTORY      reports that he has been smoking cigars. He started smoking about 37 years ago. He has been smoking about 0.00 packs per day for the past 10.00 years. He has never used smokeless tobacco. He reports previous alcohol use. He reports that he does not use drugs. PHYSICAL EXAM     INITIAL VITALS:  temperature is 98.7 °F (37.1 °C). His blood pressure is 150/68 (abnormal) and his pulse is 90. His respiration is 16 and oxygen saturation is 95%. Physical Exam  Vitals and nursing note reviewed. Constitutional:       Comments: Well Developed Well Nourished Appearing     HENT:      Head: Normocephalic and atraumatic. Eyes:      Pupils: Pupils are equal, round, and reactive to light. Cardiovascular:      Rate and Rhythm: Normal rate and regular rhythm. Heart sounds: Normal heart sounds. Pulmonary:      Effort: Pulmonary effort is normal. No respiratory distress. Breath sounds: Normal breath sounds. No wheezing. Abdominal:      General: Bowel sounds are normal. There is no distension. Palpations: Abdomen is soft. Musculoskeletal:      Cervical back: Normal range of motion and neck supple.          DIFFERENTIAL DIAGNOSIS:   COVID-19 screening    DIAGNOSTIC RESULTS     EKG: All EKG's are interpreted by the Emergency Department Physician who either signs or Co-signs this chart in the absence of a cardiologist.      RADIOLOGY: non-plain film images(s) such as CT, Ultrasound and MRI are read by the radiologist.  No orders to display         LABS:   Labs Reviewed   COVID-19, RAPID - Abnormal; Notable for the following components:       Result Value    SARS-CoV-2, NAAT DETECTED (*)     All other components within normal limits       EMERGENCY DEPARTMENT COURSE:   :    Vitals:    01/28/22 1101   BP: (!) 150/68   Pulse: 90   Resp: 16   Temp: 98.7 °F (37.1 °C)   SpO2: 95%     Patient was seen history physical exam was performed.   See disposition below    CRITICAL CARE:  None    CONSULTS:  None    PROCEDURES:  None    FINAL IMPRESSION      1. COVID-19          DISPOSITION/PLAN   Discharge    PATIENT REFERRED TO:  Berta Guidry MD  [address] [address]  [phone]    In 2 days        DISCHARGE MEDICATIONS:  Discharge Medication List as of 1/28/2022 12:28 PM          (Please note that portions of this note were completed with a voice recognitionprogram.  Efforts were made to edit the dictations but occasionally words are mis-transcribed.)    Traci Merrill, [address] [place], [place]  01/28/22 4795

## 2022-01-31 ENCOUNTER — TELEPHONE (OUTPATIENT)
Dept: INTERNAL MEDICINE CLINIC | Age: 52
End: 2022-01-31

## 2022-01-31 ENCOUNTER — TELEPHONE (OUTPATIENT)
Dept: WOUND CARE | Age: 52
End: 2022-01-31

## 2022-02-01 ENCOUNTER — CARE COORDINATION (OUTPATIENT)
Dept: CASE MANAGEMENT | Age: 52
End: 2022-02-01

## 2022-02-01 DIAGNOSIS — G62.9 NEUROPATHY: ICD-10-CM

## 2022-02-01 RX ORDER — GABAPENTIN 300 MG/1
CAPSULE ORAL
Qty: 90 CAPSULE | Refills: 1 | Status: SHIPPED | OUTPATIENT
Start: 2022-02-01 | End: 2022-02-18 | Stop reason: SDUPTHER

## 2022-02-01 RX ORDER — EMPAGLIFLOZIN 25 MG/1
TABLET, FILM COATED ORAL
Qty: 30 TABLET | Refills: 5 | Status: SHIPPED | OUTPATIENT
Start: 2022-02-01 | End: 2022-04-26 | Stop reason: SDUPTHER

## 2022-02-01 NOTE — CARE COORDINATION
[facility] Transitions Follow Up Call: Final Call    2022    Patient: Emani Mosher  Patient : 1970   MRN: [id number]  Reason for Admission: L leg swelling  Discharge Date: 22 RARS: Readmission Risk Score: 20.4 ( )         Spoke with: Karolina Meeks today and he says he feels fine. Denies covid sxs. CTN instructed pt. That if he is interested in f/u covid testing to let the PCP know, not go to the ER unless he is sick. He said I didn't know that. CTN offered to scheduled PCP f/u- Karolina Meeks said he will call  for ED f/u d/t transportation. [facility] still seeing him and he says the L leg ulcer is healed and the nurse just puts Kerlix on it. He is no longer seeing wound care nor wearing the [employer]. Denies swelling in that leg. [phone]. No other concern voiced at this time. CTN informed him of final call. Care Transitions Subsequent and Final Call    Subsequent and Final Calls  Do you have any ongoing symptoms?: No  Do you currently have any active services?: Yes  Are you currently active with any services?: Home Health  Care Transitions Interventions  No Identified Needs  Other Interventions:       Care Transitions Follow Up Call    Needs to be reviewed by the provider   Additional needs identified to be addressed with provider: No  none             Method of communication with provider : none      Care Transition Nurse (CTN) contacted the patient by telephone to follow up after admission on 22. Verified name and  with patient as identifiers. Addressed changes since last contact: none  Discussed follow-up appointments. If no appointment was previously scheduled, appointment scheduling offered: Yes. Is follow up appointment scheduled within 7 days of discharge? Yes. Advance Care Planning:   Does patient have an Advance Directive: on file.      CTN reviewed discharge instructions, medical action plan and red flags with patient and discussed any barriers to care and/or understanding of plan of care after discharge. Discussed appropriate site of care based on symptoms and resources available to patient including: PCP, Specialist and Home health. The patient agrees to contact the PCP office for questions related to their healthcare. Patients top risk factors for readmission: lack of knowledge about disease  level of motivation  medical condition-L leg swelling/ulcer, R AKA, DM, HTN, Covid  multiple health system providers  Interventions to address risk factors: Scheduled appointment with PCP-pt. to call      Non-[facility] follow up appointment(s):     CTN provided contact information for future needs. No further follow-up call indicated based on severity of symptoms and risk factors.   Plan for next call: none          Follow Up  Future Appointments   Date Time Provider Ebenezer Carmichael   2/14/2022 12:45 PM MD Adrianna Adrian [facility] [place]   2/21/2022  3:30 PM Hakeem Scott RD, LD [facility] [place]   3/22/2022 11:00 AM Matthieu Colorado, [address] [place] [address] [facility] [place]   4/27/2022  9:45 AM MD Danielito Allen RN

## 2022-02-02 ENCOUNTER — TELEPHONE (OUTPATIENT)
Dept: INTERNAL MEDICINE CLINIC | Age: 52
End: 2022-02-02

## 2022-02-02 NOTE — TELEPHONE ENCOUNTER
----- Message from Tracy Poole MD sent at 1/31/2022  3:58 PM EST -----  We can see him in of the office in about 10 days , due to recent COVID ++  X 2 -  3 and 9 days ago

## 2022-02-02 NOTE — TELEPHONE ENCOUNTER
Called Pt. To inform them that Dr. Agustin Bill wants to see them no sooner than 10 days from now. Pt. Stated understanding despite having trouble getting out. However, pt. Louis Cadena he would be able to find a way to come and stated full understanding. Pt. Has no questions at this time.

## 2022-02-10 ENCOUNTER — HOSPITAL ENCOUNTER (OUTPATIENT)
Dept: WOUND CARE | Age: 52
Discharge: HOME | End: 2022-02-10

## 2022-02-14 ENCOUNTER — OFFICE VISIT (OUTPATIENT)
Dept: UROLOGY | Age: 52
End: 2022-02-14
Payer: MEDICARE

## 2022-02-14 VITALS
WEIGHT: 223 LBS | SYSTOLIC BLOOD PRESSURE: 148 MMHG | HEIGHT: 66 IN | BODY MASS INDEX: 35.84 KG/M2 | DIASTOLIC BLOOD PRESSURE: 74 MMHG

## 2022-02-14 DIAGNOSIS — N13.8 BPH WITH URINARY OBSTRUCTION: Primary | ICD-10-CM

## 2022-02-14 DIAGNOSIS — N40.1 BPH WITH URINARY OBSTRUCTION: Primary | ICD-10-CM

## 2022-02-14 DIAGNOSIS — R33.9 URINARY RETENTION: ICD-10-CM

## 2022-02-14 PROCEDURE — G8484 FLU IMMUNIZE NO ADMIN: HCPCS | Performed by: UROLOGY

## 2022-02-14 PROCEDURE — 99212 OFFICE O/P EST SF 10 MIN: CPT | Performed by: UROLOGY

## 2022-02-14 PROCEDURE — 1036F TOBACCO NON-USER: CPT | Performed by: UROLOGY

## 2022-02-14 PROCEDURE — 3017F COLORECTAL CA SCREEN DOC REV: CPT | Performed by: UROLOGY

## 2022-02-14 PROCEDURE — G8417 CALC BMI ABV UP PARAM F/U: HCPCS | Performed by: UROLOGY

## 2022-02-14 PROCEDURE — G8427 DOCREV CUR MEDS BY ELIG CLIN: HCPCS | Performed by: UROLOGY

## 2022-02-14 NOTE — PROGRESS NOTES
Dr. Meenakshi Kerr MD MD  [facility] Consultation / New Patient Visit    Patient:  Lynette Zazueta  YOB: 1970  Date: 2/14/2022  Consult requested from Moris Wells MD     HISTORY OF PRESENT ILLNESS:   The patient is a 45 y.o. male who presents today for follow-up for the following problem(s): BPH, bladder wall thickening  Overall the problem(s) : are worsening. Associated Symptoms: No dysuria, gross hematuria. Pain Severity:      Today visit:    2/14/22   Deirdre Palacios presents with increasing difficulty emptying bladder. Deirdre Palacios only presents after Urolift to help with his atonic bladder secondary to diabetic cystopath. Today bladder emptying improved, PVR: 60 ml.       9/14/20  Today we see Deirdre Palacios after Urolift. He had a catheter placed post op for concerns of retention, which was removed without complication and he is voiding well. PVR is 79 ml today, and we are very happy to see he is having success emptying his bladder after the procedure. The patient is very happy. He denies hematuria and frequency and urgency of urination. 8/10/20  Pt has history of diabetic cystopathy with BPH and obstruction. He had a cystoscopy which showed a small obstructing gland, with severe bladder trabeculations, no obstructing median lobe. Flomax is not working, and he has elevated PVRs. He does not want a catheter. He is interested in Urolift. Risks benefits and alternative procedures are explained, informed consent is obtained, and the patient elects to proceed. 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 - Nocturia: 5-6 times  Worsening:  Yes  Duration: years  Pain:  none  Bladder scan:  295 ml  CT abd pelvis: report reviewed  - No gross  abnormalities.   Bladder reported normal.   Meds:  none   History:  none  PSA:  none    PMH: DM, MURALI  ELI:  None      Summary of old records: (Patient's old records, notes and chart reviewed and summarized above.)    Last several PSA's:  Lab Results   Component Value Date    PSA 0.78 03/24/2020       Last total testosterone:  No results found for: TESTOSTERONE    Urinalysis today:  No results found for this visit on 02/14/22. Last BUN and creatinine:  Lab Results   Component Value Date    BUN 10 01/25/2022     Lab Results   Component Value Date    CREATININE 0.8 01/25/2022       Imaging Reviewed during this Office Visit:   (results were independently reviewed by physician and radiology report verified)    PAST MEDICAL, FAMILY AND SOCIAL HISTORY:  Past Medical History:   Diagnosis Date    Atherosclerotic heart disease of native coronary artery with unspecified angina pectoris 1/18/2022    Bipolar 2 disorder ([facility]     previously followed with Dr. Gabino Estes and Laureano Huang in [facility] BPH (benign prostatic hyperplasia)     COPD (chronic obstructive pulmonary disease) ([address]     Diabetes mellitus type 2, uncontrolled ([address]     HgbA1c on 4/2/2019 was 9.1.     Diabetic peripheral neuropathy (HCC)     Diabetic polyneuropathy ([address]     Diabetic ulcer of right foot associated with type 2 diabetes mellitus ([address] 12/10/2015    Essential hypertension     \"never been on b/p medication that I know of\"    GERD (gastroesophageal reflux disease)     Hammer toe of left foot     Heart murmur     denies any chest pain or palpitations    History of tobacco abuse     Hx of AKA (above knee amputation), right ([address] 04/18/2019    Dr. Bartolo Flood    Hyperlipidemia     Macular edema, diabetic, bilateral ([address] 05/04/2018    Dr. Rosalie Horvath referred to retina specialist for 2nd opinion    Marijuana abuse in remission     Melena     MRSA (methicillin resistant staph aureus) culture positive     Onychomycosis     Other disorders of kidney and ureter in diseases classified elsewhere     Sleep apnea     no cpap    Thyroid disease     WD-Skin ulcer of fourth toe of right foot with necrosis of bone ([address] 6/29/2016     Past Surgical History:   Procedure Laterality Date    ABDOMEN SURGERY      ABSCESS DRAINAGE Right     foot    AMPUTATION ABOVE KNEE Right 4/18/2019    RIGHT ABOVE KNEE AMPUTATION performed by Anay Cuevas MD at [address], LAPAROSCOPIC N/A 4/1/2020    ROBOTIC CHOLECYSTECTOMY performed by Tammy Koehler MD at [address] [facility] [place] N/A 7/20/2020    CYSTOSCOPY performed by Liz Porter MD at [address] [place] [address] 8/21/2020    CYSTOSCOPY, UROLIFT performed by Liz Porter MD at [facility], ESOPHAGUS      ENDOSCOPY, COLON, DIAGNOSTIC      FOOT DEBRIDEMENT Right 07/01/2016    I & D    GASTROCNEMIUS RECESSION Left 11/12/2021    LEFT LEG GASTROCS RECESSION performed by Alec Briones MD at [address] [facility] Right 2/18/2019    I&D RIGHT STUMP performed by Anay Cuevas MD at [address] [facility] Right 07/20/2016    LEG AMPUTATION BELOW KNEE Right 4/4/2019    I&D AND REVISION OF AMPUTATION RIGHT LEG performed by Anay Cuevsa MD at [facility] Right 1/14/15    sole of foot I&D    DE DRAIN INFECT SHOULDER BURSA Left 8/18/2017    LEFT SHOULDER INCISION AND DRAINAGE performed by Anay Cuevas MD at [address] OFFICE/OUTPT [facility] Right 9/20/2018    EXCISIONAL DEBRIDEMENT RIGHT BKA STUMP performed by Lorin Atkins MD at [address] Right 1/16/15    2nd toe with wound vac applied    UPPER GASTROINTESTINAL ENDOSCOPY N/A 3/3/2020    EGD DILATION SAVORY performed by Harjinder Meyer MD at [facility]  ? when     Family History   Problem Relation Age of Onset    Diabetes Mother     Other Mother         pneumonia, H1N1    Depression Mother     Early Death Mother     High Blood Pressure Mother     High Cholesterol Mother     Vision Loss Maternal Grandmother     Arthritis Maternal Grandfather     Heart Disease Maternal Grandfather      No outpatient medications have been marked as taking for the 22 encounter (Office Visit) with Dinora Mcdermott MD.       Pcn [penicillins], Actos [pioglitazone], Clindamycin/lincomycin, Vancomycin, and Metformin and related  Social History     Tobacco Use   Smoking Status Former Smoker    Packs/day: 0.00    Years: 10.00    Pack years: 0.00    Types: Cigars    Start date: 1985   [place] Quit date:     Years since quittin.4   Smokeless Tobacco Never Used       Social History     Substance and Sexual Activity   Alcohol Use Not Currently    Alcohol/week: 0.0 standard drinks       REVIEW OF SYSTEMS:  Constitutional: negative  Eyes: negative  Respiratory: negative  Cardiovascular: negative  Gastrointestinal: negative  Musculoskeletal: negative  Genitourinary: negative  Skin: negative   Neurological: negative  Hematological/Lymphatic: negative  Psychological: negative    Physical Exam:    This a 45 y.o. male   Vitals:    22 1242   BP: (!) 148/74     Constitutional: Patient in no acute distress   Neuro: alert and oriented to person place and time. Psych: Mood and affect normal.  Head: atraumatic normocephalic  Eyes: EOMi  HEENT: neck supple, trachea midline  Lungs: Respiratory effort normal  Cardiovascular:  Normal peripheral pulses  Abdomen: Soft, non-tender, non-distended, No CVA  Bladder: non-tender and not distended. FROMx4, no cyanosis clubbing edema  Skin: warm and dry      Assessment and Plan      1. BPH with urinary obstruction    2. Urinary retention           Plan:      No follow-ups on file.   Continue flomax  Continue timed voiding  Credee maneuver    Follow up PRN

## 2022-02-18 ENCOUNTER — OFFICE VISIT (OUTPATIENT)
Dept: INTERNAL MEDICINE CLINIC | Age: 52
End: 2022-02-18
Payer: MEDICARE

## 2022-02-18 VITALS
TEMPERATURE: 98.6 F | OXYGEN SATURATION: 98 % | BODY MASS INDEX: 35.99 KG/M2 | HEART RATE: 94 BPM | DIASTOLIC BLOOD PRESSURE: 84 MMHG | WEIGHT: 223 LBS | SYSTOLIC BLOOD PRESSURE: 136 MMHG

## 2022-02-18 DIAGNOSIS — G62.9 NEUROPATHY: ICD-10-CM

## 2022-02-18 DIAGNOSIS — E11.65 UNCONTROLLED TYPE 2 DIABETES MELLITUS WITH HYPERGLYCEMIA, WITH LONG-TERM CURRENT USE OF INSULIN (HCC): Primary | Chronic | ICD-10-CM

## 2022-02-18 DIAGNOSIS — Z79.4 UNCONTROLLED TYPE 2 DIABETES MELLITUS WITH HYPERGLYCEMIA, WITH LONG-TERM CURRENT USE OF INSULIN (HCC): Primary | Chronic | ICD-10-CM

## 2022-02-18 DIAGNOSIS — I10 ESSENTIAL HYPERTENSION: ICD-10-CM

## 2022-02-18 PROCEDURE — 1036F TOBACCO NON-USER: CPT | Performed by: INTERNAL MEDICINE

## 2022-02-18 PROCEDURE — 99214 OFFICE O/P EST MOD 30 MIN: CPT | Performed by: INTERNAL MEDICINE

## 2022-02-18 PROCEDURE — 3051F HG A1C>EQUAL 7.0%<8.0%: CPT | Performed by: INTERNAL MEDICINE

## 2022-02-18 PROCEDURE — G8484 FLU IMMUNIZE NO ADMIN: HCPCS | Performed by: INTERNAL MEDICINE

## 2022-02-18 PROCEDURE — 3017F COLORECTAL CA SCREEN DOC REV: CPT | Performed by: INTERNAL MEDICINE

## 2022-02-18 PROCEDURE — G8427 DOCREV CUR MEDS BY ELIG CLIN: HCPCS | Performed by: INTERNAL MEDICINE

## 2022-02-18 PROCEDURE — G8417 CALC BMI ABV UP PARAM F/U: HCPCS | Performed by: INTERNAL MEDICINE

## 2022-02-18 PROCEDURE — 2022F DILAT RTA XM EVC RTNOPTHY: CPT | Performed by: INTERNAL MEDICINE

## 2022-02-18 RX ORDER — GABAPENTIN 400 MG/1
CAPSULE ORAL
Qty: 90 CAPSULE | Refills: 1 | Status: SHIPPED | OUTPATIENT
Start: 2022-02-18 | End: 2022-05-16 | Stop reason: SDUPTHER

## 2022-02-18 RX ORDER — SERTRALINE HYDROCHLORIDE 100 MG/1
100 TABLET, FILM COATED ORAL DAILY
COMMUNITY
Start: 2022-01-31

## 2022-02-18 RX ORDER — LISINOPRIL 5 MG/1
5 TABLET ORAL DAILY
Qty: 90 TABLET | Refills: 1 | Status: SHIPPED | OUTPATIENT
Start: 2022-02-18 | End: 2022-07-26

## 2022-02-18 NOTE — PROGRESS NOTES
[address] [facility]  [facility] [address] [place] [place]  Dept: [phone]  Dept Fax: [phone]    YOB: 1970    This patient presents for post hospitalization follow up. Recent medical records were reviewed from hospital.  Dr. Ty Bill has done the surgery   Back on Nov 12, 21 , and completed the antibiotics. Due to ongoing infection , seen dr. Juan Manuel Ladd recently at hospital, [facility], and they are changing the dressing. He gets around in a wheel chair, A1c 7.1 has vastly improved, he is off the omnipod and using the humalog and glargine insulin with good results. And he does have dex com. , no recent LOC, no fever or chills. Diarrhea is resolved, no bloody stools, no abd pain. Got his covid booster dose. Marquis Alejandre He was in the ER on 1/25 and discharge after being diagnosed with COVID 19 , but CXR was negative. He gets around in a wheel chair, last last A1c one month ago was 7.1 , overall improved. Home health is following, and Left LE wound has healed up. He is taking glargine   73 units BID  ,. Taste and smell is back, not SOB, no active GI issues. Patient Active Problem List   Diagnosis    Status post below knee amputation of right lower extremity (HCC)    Gait disturbance    Sebaceous cyst    Uncontrolled type 2 diabetes mellitus with hyperglycemia, with long-term current use of insulin ([facility]    Severe bipolar II disorder, recent episode major depressive, remission ([facility]    Bipolar 1 disorder (HCC)    Leukocytosis    Lactic acidosis    Hyponatremia    Normocytic anemia    Obesity (BMI 30-39. 9)    History of noncompliance with medical treatment    Essential hypertension    Tobacco dependence    Gastroesophageal reflux disease without esophagitis    History of marijuana use    Physical debility    Anemia    Electrolyte imbalance    Type 2 diabetes mellitus with hyperglycemia, with long-term current use of insulin ([facility]    Weakness    Infection of above knee amputation stump of right leg (HCC)    Above knee amputation of right lower extremity (HCC)    Sepsis [facility]    Vitamin D deficiency    COPD exacerbation (HCC)    Influenza B    Depression, recurrent ([facility]    Major depressive disorder, recurrent (HCC)    GI bleed    Elevated lipase    Other psychoactive substance abuse, uncomplicated ([facility]    Calculus of gallbladder without cholecystitis without obstruction    Right upper quadrant abdominal pain    Pedal edema    MURALI (obstructive sleep apnea)    Shortness of breath    Hypothyroid    Anxiety and depression    Dyspnea    Sinus tachycardia    Metabolic acidosis    Edema of left lower extremity    SOB (shortness of breath) on exertion    Intermittent palpitations    History of chest pain    Dizziness, nonspecific    Hyperlipidemia LDL goal <70    Neuropathy    Venous stasis ulcer of left lower leg with edema of left lower leg (HCC)    Angina pectoris, unspecified    Atherosclerotic heart disease of native coronary artery with unspecified angina pectoris       Current Outpatient Medications   Medication Sig Dispense Refill    sertraline (ZOLOFT) 100 MG tablet       lisinopril (PRINIVIL;ZESTRIL) 5 MG tablet Take 1 tablet by mouth daily 90 tablet 1    Dulaglutide 0.75 MG/0.5ML SOPN Inject 0.75 mg into the skin once a week 4 pen 1    gabapentin (NEURONTIN) 400 MG capsule Change to 400 mg TID 90 capsule 1    JARDIANCE 25 MG tablet TAKE 1 TABLET BY MOUTH ONCE DAILY. 30 tablet 5    zinc sulfate (ZINCATE) 220 (50 Zn) MG capsule Take 1 capsule by mouth daily for 7 days 7 capsule 0    nitroGLYCERIN (NITROSTAT) 0.3 MG SL tablet Place 1 tablet under the tongue every 5 minutes as needed for Chest pain up to max of 3 total doses.  If no relief after 1 dose, call 911. 30 tablet 3    Insulin Glargine, 2 Unit Dial, 300 UNIT/ML SOPN Change to 80 units BID and every 10 days increase by 5 units both times, to get am sugars 150-200 15 pen 2 overweight, in a wheel chair   Mental status - alert and oriented    Neck - supple, no significant adenopathy  Chest - clear to auscultation, no wheezes, rales or rhonchi, symmetric air entry  Heart - normal rate, regular rhythm, normal S1, S2, no murmurs, rubs, clicks or gallops  Abdomen - soft, nontender, nondistended, no masses or organomegaly  no abdominal bruits. Obese Protuberant: No  Neurological - alert, oriented, normal speech, no focal findings or movement disorder noted, motor and sensory grossly normal bilaterally  Musculoskeletal - no joint tenderness, deformity or swelling  RLE AKA   Extremities - peripheral pulses normal, no pedal edema, no clubbing or cyanosis, Eugene's sign negative bilaterally  Prosthesis: No  Skin - normal coloration and turgor, no rashes, no suspicious skin lesions noted        I have reviewed recent diagnostic testing including labs, EKG. Please see EKG for interpretation. Diagnosis Orders   1. Uncontrolled type 2 diabetes mellitus with hyperglycemia, with long-term current use of insulin (HCC)  lisinopril (PRINIVIL;ZESTRIL) 5 MG tablet   2. Essential hypertension  lisinopril (PRINIVIL;ZESTRIL) 5 MG tablet   3. Neuropathy  gabapentin (NEURONTIN) 400 MG capsule         Plan:    No orders of the defined types were placed in this encounter. Outpatient Encounter Medications as of 2/18/2022   Medication Sig Dispense Refill    sertraline (ZOLOFT) 100 MG tablet       lisinopril (PRINIVIL;ZESTRIL) 5 MG tablet Take 1 tablet by mouth daily 90 tablet 1    Dulaglutide 0.75 MG/0.5ML SOPN Inject 0.75 mg into the skin once a week 4 pen 1    gabapentin (NEURONTIN) 400 MG capsule Change to 400 mg TID 90 capsule 1    JARDIANCE 25 MG tablet TAKE 1 TABLET BY MOUTH ONCE DAILY.  30 tablet 5    zinc sulfate (ZINCATE) 220 (50 Zn) MG capsule Take 1 capsule by mouth daily for 7 days 7 capsule 0    nitroGLYCERIN (NITROSTAT) 0.3 MG SL tablet Place 1 tablet under the tongue every 5 minutes as needed for Chest pain up to max of 3 total doses. If no relief after 1 dose, call 911. 30 tablet 3    Insulin Glargine, 2 Unit Dial, 300 UNIT/ML SOPN Change to 80 units BID and every 10 days increase by 5 units both times, to get am sugars 150-200 15 pen 2    insulin lispro (HUMALOG KWIKPEN U-200) 200 UNIT/ML SOPN pen 40 units Plus scale - Max dose 160 units per day 30 pen 2    Insulin Pen Needle (PEN NEEDLES) 31G X 8 MM MISC For use with insulin injections five timed per day dx:E11.9 150 each 1    Calcium-Phosphorus-Vitamin D (CALCIUM/D3 ADULT GUMMIES PO) Take 2 tablets by mouth daily      atorvastatin (LIPITOR) 40 MG tablet Take 1 tablet by mouth nightly 30 tablet 3    fenofibrate (TRICOR) 145 MG tablet Take 1 tablet by mouth daily 30 tablet 3    levothyroxine (SYNTHROID) 50 MCG tablet Take 1 tablet by mouth daily 90 tablet 1    metoprolol tartrate (LOPRESSOR) 50 MG tablet Take 1 tablet by mouth 2 times daily 90 tablet 3    Continuous Blood Gluc Transmit (DEXCOM G6 TRANSMITTER) MISC 1 Device by Does not apply route every 3 months 1 each 3    Continuous Blood Gluc Sensor (DEXCOM G6 SENSOR) MISC 1 Device by Does not apply route every 14 days 9 each 3    Continuous Blood Gluc  (DEXCOM G6 ) LIANNE 1 Device by Does not apply route 4 times daily (before meals and nightly) 1 Device 0    ARIPiprazole (ABILIFY) 5 MG tablet Take 5 mg by mouth daily       [DISCONTINUED] gabapentin (NEURONTIN) 300 MG capsule TAKE 1 CAPSULE BY MOUTH THREE TIMES DAILY. 90 capsule 1    [DISCONTINUED] ascorbic acid (VITAMIN C) 500 MG tablet Take 1 tablet by mouth 2 times daily for 7 days 14 tablet 0    [DISCONTINUED] sertraline (ZOLOFT) 50 MG tablet Take 50 mg by mouth daily      [DISCONTINUED] lisinopril (PRINIVIL;ZESTRIL) 5 MG tablet Take 1 tablet by mouth daily 90 tablet 1     No facility-administered encounter medications on file as of 2/18/2022.         Controlled Substances Monitoring:       Diagnosis Orders 1. Uncontrolled type 2 diabetes mellitus with hyperglycemia, with long-term current use of insulin (HCC)  lisinopril (PRINIVIL;ZESTRIL) 5 MG tablet   2. Essential hypertension  lisinopril (PRINIVIL;ZESTRIL) 5 MG tablet   3. Neuropathy  gabapentin (NEURONTIN) 400 MG capsule       Will schedule follow up appointment in 3- 4 months. Plan:    Overall I am quite pleased with his DM-2 , previously uncontrolled, now doing better with A1c of 7.1 . I congratulated him on that, and ongoing compliance with diet and life style modification, and meds has been highly promoted. No change in insulin regimen, follow up closely with our diabetic clinic. Increase basaglar to 80 Units in am and 75 U pm. Added low dose  trulicity at 0. 75 mg weekly and increase as tolerated. Follow up with our   Diabetic clinic closely. HTN is stable on the current regimen, on  BB and ACE no changes. Pt with diabetic neuropathy - taking 300 mg TID, as he is having increased  Symptoms, change to 400 mg TID and follow up. Signed by : Lenn Dancer , M.D.       [address] [facility]  [address] [facility], [facility] [place], [address]    Tel  [phone]  Fax [phone]

## 2022-03-02 ENCOUNTER — TELEPHONE (OUTPATIENT)
Dept: WOUND CARE | Age: 52
End: 2022-03-02

## 2022-03-10 ENCOUNTER — TELEPHONE (OUTPATIENT)
Dept: WOUND CARE | Age: 52
End: 2022-03-10

## 2022-03-10 NOTE — ED NOTES
Impression: Type 2 diabetes mellitus w/o complication: Z21.1. Bilateral. Was taken off all diabetic medication 3 months ago. Diet control at this time. Plan: Rediscussed diagnosis in detail with patient. No treatment is required at this time. Emphasized blood sugar control. Call if [address] worsens. Will continue to observe condition and or symptoms. Recommend yearly exams. Letter sent to PCP. Pt updated on POC-  states feeling nauseated. Will update provider.       Sam Singh RN  03/11/20 [id number]

## 2022-03-21 RX ORDER — PEN NEEDLE, DIABETIC 31 GX5/16"
NEEDLE, DISPOSABLE MISCELLANEOUS
Qty: 200 EACH | Refills: 1 | Status: SHIPPED | OUTPATIENT
Start: 2022-03-21

## 2022-04-05 ENCOUNTER — TELEPHONE (OUTPATIENT)
Dept: WOUND CARE | Age: 52
End: 2022-04-05

## 2022-04-05 NOTE — TELEPHONE ENCOUNTER
----- Message from Radha Graham sent at 4/5/2022 12:37 PM EDT -----  Ebenezer Murillo CALLED ASKING WHY HIS LEFT LEG IS PURPLE? SAYS THERE IS NO INFECTION AND HEALED UP.

## 2022-04-06 NOTE — TELEPHONE ENCOUNTER
Called Ben Villanueva to follow-up on BG and Trulicity. Current diabetes pharmacotherapy:  Trulicity 6.05DZ weekly x8 weeks  Jardiance 25mg daily  Toujeo 80 units BID  Humalog 40 units plus scale TID before meals      Glucose Trends (patient recall, download unavailable):  Fasting AM: 200s  Daytime: Upper 100s-200s  Lowest in the past 2 weeks: one episode of 89 midday    BG appear to be slightly higher than at the previous appt. Ben Villanueva is due for a Trulicity refill. Recommend increasing to Trulicity 0.5SO weekly for next fill. Ben Villanueva agrees to reach out if he experiences any unusual highs/lows before his next appt on 4/26. Prescription has been pended for your review.      Thank you,    Radha Garcia, PharmD, [place]  Internal Medicine Clinical Pharmacist  [phone]

## 2022-04-20 DIAGNOSIS — G62.9 NEUROPATHY: ICD-10-CM

## 2022-04-20 RX ORDER — GABAPENTIN 400 MG/1
CAPSULE ORAL
Qty: 90 CAPSULE | Refills: 1 | OUTPATIENT
Start: 2022-04-20

## 2022-04-26 ENCOUNTER — OFFICE VISIT (OUTPATIENT)
Dept: INTERNAL MEDICINE CLINIC | Age: 52
End: 2022-04-26
Payer: MEDICARE

## 2022-04-26 VITALS
HEART RATE: 88 BPM | BODY MASS INDEX: 35.99 KG/M2 | DIASTOLIC BLOOD PRESSURE: 76 MMHG | SYSTOLIC BLOOD PRESSURE: 106 MMHG | WEIGHT: 223 LBS | TEMPERATURE: 98.3 F

## 2022-04-26 DIAGNOSIS — Z79.4 UNCONTROLLED TYPE 2 DIABETES MELLITUS WITH HYPERGLYCEMIA, WITH LONG-TERM CURRENT USE OF INSULIN (HCC): Primary | ICD-10-CM

## 2022-04-26 DIAGNOSIS — E11.65 UNCONTROLLED TYPE 2 DIABETES MELLITUS WITH HYPERGLYCEMIA, WITH LONG-TERM CURRENT USE OF INSULIN (HCC): Primary | ICD-10-CM

## 2022-04-26 DIAGNOSIS — E03.9 HYPOTHYROIDISM, UNSPECIFIED TYPE: ICD-10-CM

## 2022-04-26 DIAGNOSIS — E78.5 DYSLIPIDEMIA: ICD-10-CM

## 2022-04-26 LAB — HBA1C MFR BLD: 8.2 % (ref 4.3–5.7)

## 2022-04-26 PROCEDURE — 2022F DILAT RTA XM EVC RTNOPTHY: CPT | Performed by: INTERNAL MEDICINE

## 2022-04-26 PROCEDURE — G8417 CALC BMI ABV UP PARAM F/U: HCPCS | Performed by: INTERNAL MEDICINE

## 2022-04-26 PROCEDURE — G8427 DOCREV CUR MEDS BY ELIG CLIN: HCPCS | Performed by: INTERNAL MEDICINE

## 2022-04-26 PROCEDURE — 1036F TOBACCO NON-USER: CPT | Performed by: INTERNAL MEDICINE

## 2022-04-26 PROCEDURE — 3017F COLORECTAL CA SCREEN DOC REV: CPT | Performed by: INTERNAL MEDICINE

## 2022-04-26 PROCEDURE — 99214 OFFICE O/P EST MOD 30 MIN: CPT | Performed by: INTERNAL MEDICINE

## 2022-04-26 PROCEDURE — 83036 HEMOGLOBIN GLYCOSYLATED A1C: CPT | Performed by: INTERNAL MEDICINE

## 2022-04-26 PROCEDURE — 3052F HG A1C>EQUAL 8.0%<EQUAL 9.0%: CPT | Performed by: INTERNAL MEDICINE

## 2022-04-26 RX ORDER — EMPAGLIFLOZIN 25 MG/1
TABLET, FILM COATED ORAL
Qty: 30 TABLET | Refills: 5 | Status: SHIPPED | OUTPATIENT
Start: 2022-04-26 | End: 2022-07-14 | Stop reason: SDUPTHER

## 2022-04-26 RX ORDER — NITROGLYCERIN 0.3 MG/1
0.3 TABLET SUBLINGUAL EVERY 5 MIN PRN
Qty: 30 TABLET | Refills: 3 | Status: SHIPPED | OUTPATIENT
Start: 2022-04-26

## 2022-04-26 RX ORDER — CYCLOBENZAPRINE HCL 10 MG
TABLET ORAL
COMMUNITY
Start: 2022-03-24 | End: 2022-04-26

## 2022-04-26 RX ORDER — ATORVASTATIN CALCIUM 40 MG/1
40 TABLET, FILM COATED ORAL NIGHTLY
Qty: 30 TABLET | Refills: 3 | Status: SHIPPED | OUTPATIENT
Start: 2022-04-26 | End: 2022-08-11 | Stop reason: SDUPTHER

## 2022-04-26 RX ORDER — METOPROLOL TARTRATE 50 MG/1
50 TABLET, FILM COATED ORAL 2 TIMES DAILY
Qty: 90 TABLET | Refills: 3 | Status: SHIPPED | OUTPATIENT
Start: 2022-04-26 | End: 2022-10-03

## 2022-04-26 RX ORDER — DULAGLUTIDE 3 MG/.5ML
3 INJECTION, SOLUTION SUBCUTANEOUS WEEKLY
Qty: 4 PEN | Refills: 2 | Status: SHIPPED | OUTPATIENT
Start: 2022-04-26 | End: 2022-06-07 | Stop reason: DRUGHIGH

## 2022-04-26 RX ORDER — LEVOTHYROXINE SODIUM 0.05 MG/1
50 TABLET ORAL DAILY
Qty: 90 TABLET | Refills: 1 | Status: SHIPPED | OUTPATIENT
Start: 2022-04-26 | End: 2022-07-14 | Stop reason: SDUPTHER

## 2022-04-26 NOTE — PROGRESS NOTES
[address] [facility] Internal Medicine  [facility] [address] [place], [place]  Dept: [phone]  Dept Fax: [phone]    YOB: 1970    Kita Avila is here for follow up of DM -2 ,   Dr. Colette Alan has done the surgery   Back on Nov 12, 21 , and completed the antibiotics. Due to ongoing infection , seen dr. Doni Ramos recently at hospital, has [place] aide, and they are changing the dressing. Overall the LLE site healed well. He gets around in a wheel chair, A1c 7.1 in the past and today its 8.2 % , he is off the omnipod and using the humalog and glargine insulin with good results. And he does have dex com. , no recent LOC, no fever or chills. Diarrhea is resolved, no bloody stools, no abd pain. Got his covid booster dose. Johann Gilbert He was in the ER on 1/25 and discharge after being diagnosed with COVID 19 , but CXR was negative. . no recent low sugars, but the dexcom average is 212 over the last 14 days . He lives alone, claims makes his  Own food, recommend cut down the portions, and eat more veggies   And less carb intake. Seen dietician in the past. Had covid Vaccine   And had covid  About 3 months back, recovered well. Got one booster  Dose, recommend he gets the 2 nd booster. Patient Active Problem List   Diagnosis    Status post below knee amputation of right lower extremity (HCC)    Gait disturbance    Sebaceous cyst    Uncontrolled type 2 diabetes mellitus with hyperglycemia, with long-term current use of insulin (HCC)    Severe bipolar II disorder, recent episode major depressive, remission ([facility] [address]    Bipolar 1 disorder (HCC)    Leukocytosis    Lactic acidosis    Hyponatremia    Normocytic anemia    Obesity (BMI 30-39. 9)    History of noncompliance with medical treatment    Essential hypertension    Tobacco dependence    Gastroesophageal reflux disease without esophagitis    History of marijuana use    Physical debility    Anemia    Electrolyte imbalance    Type 2 diabetes mouth daily 90 tablet 1    metoprolol tartrate (LOPRESSOR) 50 MG tablet Take 1 tablet by mouth 2 times daily 90 tablet 3    Dulaglutide (TRULICITY) 3 DM/7.0QE SOPN Inject 3 mg into the skin once a week New dose 4 pen 2    Insulin Pen Needle (TRUEPLUS PEN NEEDLES) 31G X 8 MM MISC USE AS DIRECTED 200 each 1    sertraline (ZOLOFT) 100 MG tablet       lisinopril (PRINIVIL;ZESTRIL) 5 MG tablet Take 1 tablet by mouth daily 90 tablet 1    gabapentin (NEURONTIN) 400 MG capsule Change to 400 mg TID 90 capsule 1    Calcium-Phosphorus-Vitamin D (CALCIUM/D3 ADULT GUMMIES PO) Take 2 tablets by mouth daily      Continuous Blood Gluc Transmit (DEXCOM G6 TRANSMITTER) MISC 1 Device by Does not apply route every 3 months 1 each 3    Continuous Blood Gluc Sensor (DEXCOM G6 SENSOR) MISC 1 Device by Does not apply route every 14 days 9 each 3    Continuous Blood Gluc  (DEXCOM G6 ) LIANNE 1 Device by Does not apply route 4 times daily (before meals and nightly) 1 Device 0    ARIPiprazole (ABILIFY) 5 MG tablet Take 5 mg by mouth daily       zinc sulfate (ZINCATE) 220 (50 Zn) MG capsule Take 1 capsule by mouth daily for 7 days 7 capsule 0     No current facility-administered medications for this visit. Allergies   Allergen Reactions    Pcn [Penicillins] Shortness Of Breath, Nausea And Vomiting and Other (See Comments)     Does not remember reactions. Has TOLERATED amoxicillin and several different cephalosporins.  Actos [Pioglitazone] Swelling    Clindamycin/Lincomycin Itching    Vancomycin Hives      Rash, with erythematous plaques to abdomen, shoulders, and proximal upper extremities with pruritis, persisted with 2nd dose administered slower.       Metformin And Related Swelling       Review of Systems      see HPI      /76 (Site: Left Upper Arm)   Pulse 88   Temp 98.3 °F (36.8 °C)   Wt 223 lb (101.2 kg)   BMI 35.99 kg/m²       Physical Exam:    General appearance - alert, well appearing, and in no distress and overweight, in a wheel chair   Mental status - alert and oriented    Neck - supple, no significant adenopathy  Chest - clear to auscultation, no wheezes, rales or rhonchi, symmetric air entry  Heart - normal rate, regular rhythm, normal S1, S2, no murmurs, rubs, clicks or gallops  Abdomen - soft, nontender, nondistended, no masses or organomegaly  no abdominal bruits. Obese Protuberant: No  Neurological - alert, oriented, normal speech, no focal findings or movement disorder noted, motor and sensory grossly normal bilaterally  Musculoskeletal - no joint tenderness, deformity or swelling  RLE AKA   Extremities - peripheral pulses normal, no pedal edema, no clubbing or cyanosis, Eugene's sign negative, RLE  AKA   Prosthesis: No  Skin - normal coloration and turgor, no rashes, no suspicious skin lesions noted        I have reviewed recent diagnostic testing including labs     Diagnosis Orders   1. Uncontrolled type 2 diabetes mellitus with hyperglycemia, with long-term current use of insulin (HCC)  POCT glycosylated hemoglobin (Hb A1C)    Comprehensive Metabolic Panel    Hemoglobin A1C    CBC with Auto Differential   2. Hypothyroidism, unspecified type  levothyroxine (SYNTHROID) 50 MCG tablet    TSH with Reflex    T4, Free   3.  Dyslipidemia  Lipid Panel    Comprehensive Metabolic Panel         Plan:    Orders Placed This Encounter   Procedures    Lipid Panel     Check 7-10 days pre office visit     Standing Status:   Future     Standing Expiration Date:   4/26/2023     Order Specific Question:   Is Patient Fasting?/# of Hours     Answer:   yes     Comments:   12 hours    Comprehensive Metabolic Panel     Standing Status:   Future     Standing Expiration Date:   4/26/2023    Hemoglobin A1C     Check 7-10 days pre office visit     Standing Status:   Future     Standing Expiration Date:   4/26/2023    CBC with Auto Differential     Check 7-10 days pre office visit     Standing Status:   Future Standing Expiration Date:   4/26/2023    TSH with Reflex     Standing Status:   Future     Standing Expiration Date:   4/26/2023    T4, Free     Standing Status:   Future     Standing Expiration Date:   4/26/2023    POCT glycosylated hemoglobin (Hb A1C)       Outpatient Encounter Medications as of 4/26/2022   Medication Sig Dispense Refill    empagliflozin (JARDIANCE) 25 MG tablet TAKE 1 TABLET BY MOUTH ONCE DAILY. 30 tablet 5    nitroGLYCERIN (NITROSTAT) 0.3 MG SL tablet Place 1 tablet under the tongue every 5 minutes as needed for Chest pain up to max of 3 total doses.  If no relief after 1 dose, call 911. 30 tablet 3    Insulin Glargine, 2 Unit Dial, 300 UNIT/ML SOPN Change to 85 U in am , 80 U PM  and every 10 days increase by 5 units both times, to get am sugars 150-200 15 pen 2    insulin lispro (HUMALOG KWIKPEN U-200) 200 UNIT/ML SOPN pen 40 units Plus scale - Max dose 160 units per day 30 pen 2    atorvastatin (LIPITOR) 40 MG tablet Take 1 tablet by mouth nightly 30 tablet 3    levothyroxine (SYNTHROID) 50 MCG tablet Take 1 tablet by mouth daily 90 tablet 1    metoprolol tartrate (LOPRESSOR) 50 MG tablet Take 1 tablet by mouth 2 times daily 90 tablet 3    Dulaglutide (TRULICITY) 3 RB/6.5UW SOPN Inject 3 mg into the skin once a week New dose 4 pen 2    Insulin Pen Needle (TRUEPLUS PEN NEEDLES) 31G X 8 MM MISC USE AS DIRECTED 200 each 1    sertraline (ZOLOFT) 100 MG tablet       lisinopril (PRINIVIL;ZESTRIL) 5 MG tablet Take 1 tablet by mouth daily 90 tablet 1    gabapentin (NEURONTIN) 400 MG capsule Change to 400 mg TID 90 capsule 1    Calcium-Phosphorus-Vitamin D (CALCIUM/D3 ADULT GUMMIES PO) Take 2 tablets by mouth daily      Continuous Blood Gluc Transmit (DEXCOM G6 TRANSMITTER) MISC 1 Device by Does not apply route every 3 months 1 each 3    Continuous Blood Gluc Sensor (DEXCOM G6 SENSOR) MISC 1 Device by Does not apply route every 14 days 9 each 3    Continuous Blood Gluc  (DEXCOM G6 ) LIANNE 1 Device by Does not apply route 4 times daily (before meals and nightly) 1 Device 0    ARIPiprazole (ABILIFY) 5 MG tablet Take 5 mg by mouth daily       [DISCONTINUED] cyclobenzaprine (FLEXERIL) 10 MG tablet       [DISCONTINUED] Dulaglutide 1.5 MG/0.5ML SOPN Inject 1.5 mg into the skin once a week *dose increase* 4 pen 3    [DISCONTINUED] Dulaglutide 0.75 MG/0.5ML SOPN Inject 0.75 mg into the skin once a week 4 pen 1    [DISCONTINUED] Dulaglutide 0.75 MG/0.5ML SOPN Inject 0.75 once weekly. 2 pen 0    [DISCONTINUED] JARDIANCE 25 MG tablet TAKE 1 TABLET BY MOUTH ONCE DAILY. 30 tablet 5    zinc sulfate (ZINCATE) 220 (50 Zn) MG capsule Take 1 capsule by mouth daily for 7 days 7 capsule 0    [DISCONTINUED] nitroGLYCERIN (NITROSTAT) 0.3 MG SL tablet Place 1 tablet under the tongue every 5 minutes as needed for Chest pain up to max of 3 total doses. If no relief after 1 dose, call 911. 30 tablet 3    [DISCONTINUED] Insulin Glargine, 2 Unit Dial, 300 UNIT/ML SOPN Change to 80 units BID and every 10 days increase by 5 units both times, to get am sugars 150-200 15 pen 2    [DISCONTINUED] insulin lispro (HUMALOG KWIKPEN U-200) 200 UNIT/ML SOPN pen 40 units Plus scale - Max dose 160 units per day 30 pen 2    [DISCONTINUED] atorvastatin (LIPITOR) 40 MG tablet Take 1 tablet by mouth nightly 30 tablet 3    [DISCONTINUED] fenofibrate (TRICOR) 145 MG tablet Take 1 tablet by mouth daily 30 tablet 3    [DISCONTINUED] levothyroxine (SYNTHROID) 50 MCG tablet Take 1 tablet by mouth daily 90 tablet 1    [DISCONTINUED] metoprolol tartrate (LOPRESSOR) 50 MG tablet Take 1 tablet by mouth 2 times daily 90 tablet 3     No facility-administered encounter medications on file as of 4/26/2022. Controlled Substances Monitoring:       Diagnosis Orders   1.  Uncontrolled type 2 diabetes mellitus with hyperglycemia, with long-term current use of insulin (HCC)  POCT glycosylated hemoglobin (Hb A1C) Comprehensive Metabolic Panel    Hemoglobin A1C    CBC with Auto Differential   2. Hypothyroidism, unspecified type  levothyroxine (SYNTHROID) 50 MCG tablet    TSH with Reflex    T4, Free   3. Dyslipidemia  Lipid Panel    Comprehensive Metabolic Panel       Will schedule follow up appointment in 3- 4 months. Plan:    As sugars have increased with todays A2c of 8.2 % , increasing the regimen of insulin and trulicity as outlined below. compliance with diet and life style modification, and meds has been highly promoted. No change in insulin regimen, follow up closely with our diabetic clinic. Increase basaglar to 85 Units am, and keep 80 U PM.  Increase   The trulicity to 3 mg Q weekly, and titrate as tolerated for optimal sugars. And recommended dietary consult, and follow up with Gabriela Asencio / Mekhi Brown in our   Diabetic clinic. Strong dietary and  life style changes and regular exercise was recommended, and to lead an active life style . Diabetic education material was provided to the patient on this visit. Pt was counseled extensively on both   Diet and exercise regimen today , along with healthy living with diabetes. HTN is stable on the current regimen on  BB and ACE no changes. Pt with diabetic neuropathy - taking 300 mg TID, as he is having increased  Symptoms, changed to 400 mg TID and follow up. And also seeing dr. Wing Wei , PM/R doc for MRI of the neck per his recommendations  Imaging studies next month . Signed by : Brandy Garcia M.D.       [address] [facility] Internal Medicine  [address] [facility], [address]  [address] [facility], [address]    Tel  [phone]  Fax [phone]

## 2022-05-04 DIAGNOSIS — G62.9 NEUROPATHY: ICD-10-CM

## 2022-05-09 ENCOUNTER — HOSPITAL ENCOUNTER (OUTPATIENT)
Dept: MRI IMAGING | Age: 52
Discharge: HOME | End: 2022-05-09
Payer: MEDICARE

## 2022-05-09 DIAGNOSIS — M54.12 RADICULOPATHY, CERVICAL REGION: ICD-10-CM

## 2022-05-09 PROCEDURE — 72141 MRI NECK SPINE W/O DYE: CPT

## 2022-05-11 ENCOUNTER — TELEPHONE (OUTPATIENT)
Dept: INFECTIOUS DISEASES | Age: 52
End: 2022-05-11

## 2022-05-11 ENCOUNTER — TELEPHONE (OUTPATIENT)
Dept: INTERNAL MEDICINE CLINIC | Age: 52
End: 2022-05-11

## 2022-05-11 NOTE — TELEPHONE ENCOUNTER
Received call from patient requesting appointment. Records reviewed, shows that patient was last seen in clinic on January 12, 2022. He cancelled his follow up visits as scheduled 1/27/22 and 2/10/22. Advised him that he will need a new referral as it has been greater than 3 months since his last visit.

## 2022-05-11 NOTE — TELEPHONE ENCOUNTER
----- Message from Callie Siddiqui sent at 5/11/2022 10:58 AM EDT -----  Subject: Referral Request    QUESTIONS   Reason for referral request? Wound on middle finger   Has the physician seen you for this condition before? Yes  Select a date? 2022-04-27  Select the Provider the patient wants to be referred to, if known (PCP or   Specialist)? Outside Physician - [address] [place] [address]   Preferred Specialist (if applicable)? Do you already have an appointment scheduled? No  Additional Information for Provider?   ---------------------------------------------------------------------------  --------------  CALL BACK INFO  What is the best way for the office to contact you? OK to leave message on   voicemail  Preferred Call Back Phone Number? [phone]  ---------------------------------------------------------------------------  --------------  SCRIPT ANSWERS  Relationship to Patient?  Self

## 2022-05-16 ENCOUNTER — TELEPHONE (OUTPATIENT)
Dept: INTERNAL MEDICINE CLINIC | Age: 52
End: 2022-05-16

## 2022-05-16 DIAGNOSIS — Z79.4 TYPE 2 DIABETES MELLITUS WITH HYPERGLYCEMIA, WITH LONG-TERM CURRENT USE OF INSULIN (HCC): Primary | ICD-10-CM

## 2022-05-16 DIAGNOSIS — E11.65 TYPE 2 DIABETES MELLITUS WITH HYPERGLYCEMIA, WITH LONG-TERM CURRENT USE OF INSULIN (HCC): Primary | ICD-10-CM

## 2022-05-16 RX ORDER — GABAPENTIN 400 MG/1
CAPSULE ORAL
Qty: 90 CAPSULE | Refills: 1 | Status: SHIPPED | OUTPATIENT
Start: 2022-05-16 | End: 2022-07-14 | Stop reason: SDUPTHER

## 2022-05-17 ENCOUNTER — OFFICE VISIT (OUTPATIENT)
Dept: INTERNAL MEDICINE CLINIC | Age: 52
End: 2022-05-17
Payer: MEDICARE

## 2022-05-17 ENCOUNTER — HOSPITAL ENCOUNTER (EMERGENCY)
Age: 52
Discharge: HOME | End: 2022-05-17
Payer: MEDICARE

## 2022-05-17 ENCOUNTER — APPOINTMENT (OUTPATIENT)
Dept: CT IMAGING | Age: 52
End: 2022-05-17
Payer: MEDICARE

## 2022-05-17 VITALS
BODY MASS INDEX: 35.99 KG/M2 | SYSTOLIC BLOOD PRESSURE: 114 MMHG | DIASTOLIC BLOOD PRESSURE: 78 MMHG | TEMPERATURE: 98.6 F | HEART RATE: 110 BPM | WEIGHT: 223 LBS

## 2022-05-17 VITALS
HEIGHT: 66 IN | SYSTOLIC BLOOD PRESSURE: 131 MMHG | DIASTOLIC BLOOD PRESSURE: 59 MMHG | WEIGHT: 223 LBS | TEMPERATURE: 98.4 F | HEART RATE: 100 BPM | RESPIRATION RATE: 16 BRPM | OXYGEN SATURATION: 99 % | BODY MASS INDEX: 35.84 KG/M2

## 2022-05-17 DIAGNOSIS — R82.998 DARK URINE: ICD-10-CM

## 2022-05-17 DIAGNOSIS — R30.0 DYSURIA: ICD-10-CM

## 2022-05-17 DIAGNOSIS — R10.13 EPIGASTRIC PAIN: Primary | ICD-10-CM

## 2022-05-17 DIAGNOSIS — K21.9 GASTROESOPHAGEAL REFLUX DISEASE WITHOUT ESOPHAGITIS: ICD-10-CM

## 2022-05-17 DIAGNOSIS — R10.12 LEFT UPPER QUADRANT ABDOMINAL PAIN: ICD-10-CM

## 2022-05-17 DIAGNOSIS — R19.7 DIARRHEA, UNSPECIFIED TYPE: Primary | ICD-10-CM

## 2022-05-17 DIAGNOSIS — R14.0 ABDOMINAL BLOATING: ICD-10-CM

## 2022-05-17 DIAGNOSIS — R19.7 DIARRHEA, UNSPECIFIED TYPE: ICD-10-CM

## 2022-05-17 DIAGNOSIS — R68.81 EARLY SATIETY: ICD-10-CM

## 2022-05-17 DIAGNOSIS — S61.209A FINGER WOUND, SIMPLE, OPEN, INITIAL ENCOUNTER: ICD-10-CM

## 2022-05-17 DIAGNOSIS — K92.1 BLACK TARRY STOOLS: ICD-10-CM

## 2022-05-17 DIAGNOSIS — R82.90 FOUL SMELLING URINE: ICD-10-CM

## 2022-05-17 LAB
ALBUMIN SERPL-MCNC: 4 G/DL (ref 3.5–5.1)
ALP BLD-CCNC: 76 U/L (ref 38–126)
ALT SERPL-CCNC: 17 U/L (ref 11–66)
ANION GAP SERPL CALCULATED.3IONS-SCNC: 14 MEQ/L (ref 8–16)
AST SERPL-CCNC: 23 U/L (ref 5–40)
BASOPHILS # BLD: 0.5 %
BASOPHILS ABSOLUTE: 0.1 THOU/MM3 (ref 0–0.1)
BILIRUB SERPL-MCNC: 0.4 MG/DL (ref 0.3–1.2)
BUN BLDV-MCNC: 11 MG/DL (ref 7–22)
CALCIUM SERPL-MCNC: 9.5 MG/DL (ref 8.5–10.5)
CHLORIDE BLD-SCNC: 95 MEQ/L (ref 98–111)
CO2: 26 MEQ/L (ref 23–33)
CREAT SERPL-MCNC: 0.7 MG/DL (ref 0.4–1.2)
EKG ATRIAL RATE: 100 BPM
EKG P AXIS: 34 DEGREES
EKG P-R INTERVAL: 180 MS
EKG Q-T INTERVAL: 348 MS
EKG QRS DURATION: 76 MS
EKG QTC CALCULATION (BAZETT): 448 MS
EKG R AXIS: 6 DEGREES
EKG T AXIS: 49 DEGREES
EKG VENTRICULAR RATE: 100 BPM
EOSINOPHIL # BLD: 3.1 %
EOSINOPHILS ABSOLUTE: 0.5 THOU/MM3 (ref 0–0.4)
ERYTHROCYTE [DISTWIDTH] IN BLOOD BY AUTOMATED COUNT: 13.8 % (ref 11.5–14.5)
ERYTHROCYTE [DISTWIDTH] IN BLOOD BY AUTOMATED COUNT: 45.1 FL (ref 35–45)
GFR SERPL CREATININE-BSD FRML MDRD: > 90 ML/MIN/1.73M2
GLUCOSE BLD-MCNC: 145 MG/DL (ref 70–108)
HCT VFR BLD CALC: 42.7 % (ref 42–52)
HEMOGLOBIN: 14.8 GM/DL (ref 14–18)
IMMATURE GRANS (ABS): 0.11 THOU/MM3 (ref 0–0.07)
IMMATURE GRANULOCYTES: 0.7 %
LIPASE: 60.1 U/L (ref 5.6–51.3)
LYMPHOCYTES # BLD: 22.6 %
LYMPHOCYTES ABSOLUTE: 3.3 THOU/MM3 (ref 1–4.8)
MCH RBC QN AUTO: 31 PG (ref 26–33)
MCHC RBC AUTO-ENTMCNC: 34.7 GM/DL (ref 32.2–35.5)
MCV RBC AUTO: 89.3 FL (ref 80–94)
MONOCYTES # BLD: 7.5 %
MONOCYTES ABSOLUTE: 1.1 THOU/MM3 (ref 0.4–1.3)
NUCLEATED RED BLOOD CELLS: 0 /100 WBC
OSMOLALITY CALCULATION: 272.1 MOSMOL/KG (ref 275–300)
PLATELET # BLD: 258 THOU/MM3 (ref 130–400)
PMV BLD AUTO: 9.1 FL (ref 9.4–12.4)
POTASSIUM SERPL-SCNC: 4.6 MEQ/L (ref 3.5–5.2)
RBC # BLD: 4.78 MILL/MM3 (ref 4.7–6.1)
SEG NEUTROPHILS: 65.6 %
SEGMENTED NEUTROPHILS ABSOLUTE COUNT: 9.7 THOU/MM3 (ref 1.8–7.7)
SODIUM BLD-SCNC: 135 MEQ/L (ref 135–145)
TOTAL PROTEIN: 7.4 G/DL (ref 6.1–8)
WBC # BLD: 14.8 THOU/MM3 (ref 4.8–10.8)

## 2022-05-17 PROCEDURE — 2580000003 HC RX 258: Performed by: PHYSICIAN ASSISTANT

## 2022-05-17 PROCEDURE — 99285 EMERGENCY DEPT VISIT HI MDM: CPT

## 2022-05-17 PROCEDURE — 74177 CT ABD & PELVIS W/CONTRAST: CPT

## 2022-05-17 PROCEDURE — 6360000002 HC RX W HCPCS: Performed by: PHYSICIAN ASSISTANT

## 2022-05-17 PROCEDURE — G8417 CALC BMI ABV UP PARAM F/U: HCPCS | Performed by: STUDENT IN AN ORGANIZED HEALTH CARE EDUCATION/TRAINING PROGRAM

## 2022-05-17 PROCEDURE — 96375 TX/PRO/DX INJ NEW DRUG ADDON: CPT

## 2022-05-17 PROCEDURE — 85025 COMPLETE CBC W/AUTO DIFF WBC: CPT

## 2022-05-17 PROCEDURE — 6360000004 HC RX CONTRAST MEDICATION: Performed by: PHYSICIAN ASSISTANT

## 2022-05-17 PROCEDURE — 96374 THER/PROPH/DIAG INJ IV PUSH: CPT

## 2022-05-17 PROCEDURE — 93010 ELECTROCARDIOGRAM REPORT: CPT | Performed by: INTERNAL MEDICINE

## 2022-05-17 PROCEDURE — 36415 COLL VENOUS BLD VENIPUNCTURE: CPT

## 2022-05-17 PROCEDURE — G8427 DOCREV CUR MEDS BY ELIG CLIN: HCPCS | Performed by: STUDENT IN AN ORGANIZED HEALTH CARE EDUCATION/TRAINING PROGRAM

## 2022-05-17 PROCEDURE — 99214 OFFICE O/P EST MOD 30 MIN: CPT | Performed by: STUDENT IN AN ORGANIZED HEALTH CARE EDUCATION/TRAINING PROGRAM

## 2022-05-17 PROCEDURE — 93005 ELECTROCARDIOGRAM TRACING: CPT | Performed by: PHYSICIAN ASSISTANT

## 2022-05-17 PROCEDURE — 3017F COLORECTAL CA SCREEN DOC REV: CPT | Performed by: STUDENT IN AN ORGANIZED HEALTH CARE EDUCATION/TRAINING PROGRAM

## 2022-05-17 PROCEDURE — 2500000003 HC RX 250 WO HCPCS: Performed by: PHYSICIAN ASSISTANT

## 2022-05-17 PROCEDURE — 83690 ASSAY OF LIPASE: CPT

## 2022-05-17 PROCEDURE — 80053 COMPREHEN METABOLIC PANEL: CPT

## 2022-05-17 PROCEDURE — 1036F TOBACCO NON-USER: CPT | Performed by: STUDENT IN AN ORGANIZED HEALTH CARE EDUCATION/TRAINING PROGRAM

## 2022-05-17 PROCEDURE — A4216 STERILE WATER/SALINE, 10 ML: HCPCS | Performed by: PHYSICIAN ASSISTANT

## 2022-05-17 RX ORDER — METOCLOPRAMIDE HYDROCHLORIDE 5 MG/ML
10 INJECTION INTRAMUSCULAR; INTRAVENOUS ONCE
Status: COMPLETED | OUTPATIENT
Start: 2022-05-17 | End: 2022-05-17

## 2022-05-17 RX ORDER — 0.9 % SODIUM CHLORIDE 0.9 %
1000 INTRAVENOUS SOLUTION INTRAVENOUS ONCE
Status: COMPLETED | OUTPATIENT
Start: 2022-05-17 | End: 2022-05-17

## 2022-05-17 RX ADMIN — SODIUM CHLORIDE 1000 ML: 9 INJECTION, SOLUTION INTRAVENOUS at 17:32

## 2022-05-17 RX ADMIN — METOCLOPRAMIDE 10 MG: 5 INJECTION, SOLUTION INTRAMUSCULAR; INTRAVENOUS at 17:30

## 2022-05-17 RX ADMIN — IOPAMIDOL 80 ML: 755 INJECTION, SOLUTION INTRAVENOUS at 18:50

## 2022-05-17 RX ADMIN — FAMOTIDINE 20 MG: 10 INJECTION, SOLUTION INTRAVENOUS at 17:30

## 2022-05-17 ASSESSMENT — ENCOUNTER SYMPTOMS
ABDOMINAL PAIN: 1
BACK PAIN: 0
SORE THROAT: 0
VOMITING: 0
NAUSEA: 0
DIARRHEA: 1
SHORTNESS OF BREATH: 0
RHINORRHEA: 0
PHOTOPHOBIA: 0
COUGH: 0
ABDOMINAL DISTENTION: 1

## 2022-05-17 ASSESSMENT — PAIN - FUNCTIONAL ASSESSMENT: PAIN_FUNCTIONAL_ASSESSMENT: NONE - DENIES PAIN

## 2022-05-17 NOTE — ED NOTES
Pt up into bed from wheelchair. Denies any needs. Urinal at bedside. Pt voiced no concern.  Will monitor      Margarito Olivares RN  05/17/22 [id number]

## 2022-05-17 NOTE — ED TRIAGE NOTES
Presents to ER with complaints of ongoing diarrhea. Pt states anytime he eats he immediately has watery diarrhea. States he has had dark urine that has been ongoing for 2 days.

## 2022-05-17 NOTE — ED PROVIDER NOTES
photophobia. Respiratory: Negative for cough and shortness of breath. Cardiovascular: Negative for chest pain. Gastrointestinal: Positive for abdominal distention, abdominal pain and diarrhea. Negative for nausea and vomiting. Endocrine: Negative for polyuria. Genitourinary: Positive for frequency. Negative for difficulty urinating, dysuria and flank pain. Musculoskeletal: Negative for back pain. Skin: Negative for rash. Neurological: Negative for dizziness, weakness, numbness and headaches. Psychiatric/Behavioral: Negative for confusion and sleep disturbance. PAST MEDICAL HISTORY    has a past medical history of Atherosclerotic heart disease of native coronary artery with unspecified angina pectoris, Bipolar 2 disorder ([address] BPH (benign prostatic hyperplasia), COPD (chronic obstructive pulmonary disease) ([address] Diabetes mellitus type 2, uncontrolled ([address] Diabetic peripheral neuropathy ([address] Diabetic polyneuropathy ([address] Diabetic ulcer of right foot associated with type 2 diabetes mellitus ([address] Essential hypertension, GERD (gastroesophageal reflux disease), Hammer toe of left foot, Heart murmur, History of tobacco abuse, Hx of AKA (above knee amputation), right ([address] Hyperlipidemia, Macular edema, diabetic, bilateral ([address] Marijuana abuse in remission, Melena, MRSA (methicillin resistant staph aureus) culture positive, Onychomycosis, Other disorders of kidney and ureter in diseases classified elsewhere, Sleep apnea, Thyroid disease, and 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 SURGICAL HISTORY      has a past surgical history that includes other surgical history (Right, 1/14/15); Abscess Drainage (Right); Toe amputation (Right, 1/16/15); Foot Debridement (Right, 07/01/2016); Leg amputation below knee (Right, 07/20/2016);  Mark tooth extraction (?when); pr drain infect shoulder bursa (Left, 8/18/2017); pr office/outpt visit,procedure only (Right, 9/20/2018); incision and drainage (Right, 2/18/2019); Leg amputation below knee (Right, 4/4/2019); AMPUTATION ABOVE KNEE (Right, 4/18/2019); Upper gastrointestinal endoscopy (N/A, 3/3/2020); Cholecystectomy, laparoscopic (N/A, 4/1/2020); Endoscopy, colon, diagnostic; Cystoscopy (N/A, 7/20/2020); Cystoscopy (N/A, 8/21/2020); Gastrocnemius Recession (Left, 11/12/2021); Abdomen surgery; Colonoscopy; and Dilatation, esophagus. CURRENT MEDICATIONS       Previous Medications    ARIPIPRAZOLE (ABILIFY) 5 MG TABLET    Take 5 mg by mouth daily     ATORVASTATIN (LIPITOR) 40 MG TABLET    Take 1 tablet by mouth nightly    CALCIUM-PHOSPHORUS-VITAMIN D (CALCIUM/D3 ADULT GUMMIES PO)    Take 2 tablets by mouth daily    CONTINUOUS BLOOD GLUC  (DEXCOM G6 ) LIANNE    1 Device by Does not apply route 4 times daily (before meals and nightly)    CONTINUOUS BLOOD GLUC SENSOR (DEXCOM G6 SENSOR) MISC    1 Device by Does not apply route every 14 days    CONTINUOUS BLOOD GLUC TRANSMIT (DEXCOM G6 TRANSMITTER) MISC    1 Device by Does not apply route every 3 months    DULAGLUTIDE (TRULICITY) 3 TI/8.7IU SOPN    Inject 3 mg into the skin once a week New dose    EMPAGLIFLOZIN (JARDIANCE) 25 MG TABLET    TAKE 1 TABLET BY MOUTH ONCE DAILY.     GABAPENTIN (NEURONTIN) 400 MG CAPSULE    Change to 400 mg TID    INSULIN GLARGINE, 2 UNIT DIAL, 300 UNIT/ML SOPN    Change to 85 U in am , 80 U PM  and every 10 days increase by 5 units both times, to get am sugars 150-200    INSULIN LISPRO (HUMALOG KWIKPEN U-200) 200 UNIT/ML SOPN PEN    40 units Plus scale - Max dose 160 units per day    INSULIN PEN NEEDLE (TRUEPLUS PEN NEEDLES) 31G X 8 MM MISC    USE AS DIRECTED    LEVOTHYROXINE (SYNTHROID) 50 MCG TABLET    Take 1 tablet by mouth daily    LISINOPRIL (PRINIVIL;ZESTRIL) 5 MG TABLET    Take 1 tablet by mouth daily    METOPROLOL TARTRATE (LOPRESSOR) 50 MG TABLET    Take 1 tablet by mouth 2 times daily    NITROGLYCERIN (NITROSTAT) 0.3 MG SL TABLET Place 1 tablet under the tongue every 5 minutes as needed for Chest pain up to max of 3 total doses. If no relief after 1 dose, call 911. SERTRALINE (ZOLOFT) 100 MG TABLET        ZINC SULFATE (ZINCATE) 220 (50 ZN) MG CAPSULE    Take 1 capsule by mouth daily for 7 days       ALLERGIES     is allergic to pcn [penicillins], actos [pioglitazone], clindamycin/lincomycin, vancomycin, and metformin and related. FAMILY HISTORY     He indicated that his mother is . He indicated that the status of his father is unknown and reported the following: unknown. He indicated that the status of his maternal grandmother is unknown. He indicated that the status of his maternal grandfather is unknown.   family history includes Arthritis in his maternal grandfather; Depression in his mother; Diabetes in his mother; Early Death in his mother; Heart Disease in his maternal grandfather; High Blood Pressure in his mother; High Cholesterol in his mother; Other in his mother; Vision Loss in his maternal grandmother. SOCIAL HISTORY    reports that he quit smoking about 21 years ago. His smoking use included cigars. He started smoking about 37 years ago. He smoked 0.00 packs per day for 10.00 years. He has never used smokeless tobacco. He reports previous alcohol use. He reports that he does not use drugs. PHYSICAL EXAM     INITIAL VITALS:  height is 5' 6\" (1.676 m) and weight is 223 lb (101.2 kg). His oral temperature is 98.4 °F (36.9 °C). His blood pressure is 131/59 (abnormal) and his pulse is 100. His respiration is 16 and oxygen saturation is 99%. Physical Exam  Vitals and nursing note reviewed. Constitutional:       General: He is not in acute distress. Appearance: Normal appearance. He is well-developed. He is not toxic-appearing or diaphoretic. HENT:      Head: Normocephalic and atraumatic. Right Ear: Hearing normal.      Left Ear: Hearing normal.      Nose: Nose normal. No rhinorrhea. Mouth/Throat:      Lips: Pink. Mouth: Mucous membranes are moist.      Pharynx: Uvula midline. Eyes:      General: Lids are normal. No scleral icterus. Extraocular Movements: Extraocular movements intact. Conjunctiva/sclera: Conjunctivae normal.      Pupils: Pupils are equal, round, and reactive to light. Neck:      Trachea: Trachea normal. No tracheal deviation. Cardiovascular:      Rate and Rhythm: Normal rate and regular rhythm. Heart sounds: Normal heart sounds. No murmur heard. Pulmonary:      Effort: Pulmonary effort is normal. No respiratory distress. Breath sounds: Normal breath sounds. No stridor. No decreased breath sounds or wheezing. Abdominal:      General: Bowel sounds are normal. There is distension. Palpations: Abdomen is soft. Abdomen is not rigid. There is no pulsatile mass. Tenderness: There is abdominal tenderness in the left upper quadrant. There is no right CVA tenderness, left CVA tenderness, guarding or rebound. Negative signs include Bernardo's sign and McBurney's sign. Hernia: No hernia is present. Musculoskeletal:         General: Normal range of motion. Cervical back: No rigidity. No muscular tenderness. Comments: Movement normal as observed; right lower extremity AKA   Lymphadenopathy:      Cervical: No cervical adenopathy. Skin:     General: Skin is warm and dry. Coloration: Skin is not pale. Findings: No rash. Neurological:      General: No focal deficit present. Mental Status: He is alert and oriented to person, place, and time. Sensory: No sensory deficit. Gait: Gait normal.   Psychiatric:         Attention and Perception: Attention normal.         Mood and Affect: Mood normal.         Speech: Speech normal.         Behavior: Behavior normal. Behavior is cooperative. Thought Content:  Thought content normal.         Cognition and Memory: Cognition normal.         DIFFERENTIAL DIAGNOSIS: Including but not limited to: Gastroenteritis, infectious diarrhea, adverse drug reaction to Pepto-Bismol, GERD, ACS, ELDA, colitis    DIAGNOSTIC RESULTS     EKG: All EKG's are interpreted by theEmergency Department Physician who either signs or Co-signs this chart in the absence of a cardiologist.  Ventricular Rate  P    Atrial Rate  P    P-R Interval ms 180 P    QRS Duration ms 76 P    Q-T Interval ms 348 P    QTc Calculation (Bazett) ms 448 P    P Axis degrees 34 P    R Axis degrees 6 P    T Axis degrees 49 P         Narrative & Impression    Normal sinus rhythm  Normal ECG  When compared with ECG of 25-JAN-2022 21:19,  No significant change was found               RADIOLOGY: non-plain film images(s) such as CT,Ultrasound and MRI are read by the radiologist.  Plain radiographic images are visualized and preliminarily interpreted by the emergency physician unless otherwise stated below. CT ABDOMEN PELVIS W IV CONTRAST Additional Contrast? None   Final Result   1. No acute abdominal or pelvic disease. 2. Fatty infiltration of the liver. This document has been electronically signed by: Sylvie Villagran MD on    05/17/2022 07:17 PM      All CTs at this facility use dose modulation techniques and iterative    reconstructions, and/or weight-based dosing   when appropriate to reduce radiation to a low as reasonably achievable.           LABS:   Labs Reviewed   CBC WITH AUTO DIFFERENTIAL - Abnormal; Notable for the following components:       Result Value    WBC 14.8 (*)     RDW-SD 45.1 (*)     MPV 9.1 (*)     Segs Absolute 9.7 (*)     Eosinophils Absolute 0.5 (*)     Immature Grans (Abs) 0.11 (*)     All other components within normal limits   COMPREHENSIVE METABOLIC PANEL - Abnormal; Notable for the following components:    Glucose 145 (*)     Chloride 95 (*)     All other components within normal limits   LIPASE - Abnormal; Notable for the following components:    Lipase 60.1 (*)     All other components within normal limits   OSMOLALITY - Abnormal; Notable for the following components:    Osmolality Calc 272.1 (*)     All other components within normal limits   ANION GAP   GLOMERULAR FILTRATION RATE, ESTIMATED   URINALYSIS WITH REFLEX TO CULTURE       EMERGENCY DEPARTMENT COURSE:   Vitals:    Vitals:    05/17/22 1526 05/17/22 1554 05/17/22 1701 05/17/22 1840   BP: (!) 148/86 122/87 118/64 (!) 131/59   Pulse: 104 106 99 100   Resp: 15 16 16 16   Temp: 98.4 °F (36.9 °C)      TempSrc: Oral      SpO2: 98% 98% 100% 99%   Weight: 223 lb (101.2 kg)      Height: 5' 6\" (1.676 m)          Patient was seen in the emergency department during the global pandemic, when there was surge capacity and regional healthcare crisis. MDM:  The patient was seen and evaluated within the ED today for multiple complaints including left upper quadrant pain, diarrhea, and GERD. On exam, I appreciated no acute findings. There was mild tenderness in the left upper quadrant and mild abdominal distention. Old records were reviewed. Within the department, I observed the patient's vital signs to be within acceptable range. Radiological studies within the department revealed no acute intra-abdominal process. Laboratory work was reassuring. Within the department the patient was treated with Pepcid, Reglan, and saline. I observed the patient's condition to modestly improve during the duration of their stay. On re-exam patient's pain and symptoms were decreased. Abdomen was soft and nontender. Vital signs remained stable. The patient remained non-toxic appearing with no distress evident in the ER. The patient did not provide a urine sample while here and requested discharge prior to giving 1. Patient felt significantly improved and wanted to go home. The patient was comfortable with the plan of discharge home and to follow up with Dr. Kadie Moctezuma. Anticipatory guidance was given.  The patient was instructed to return to the emergency department for any worsening of their symptoms. Patient declined prescriptions for at home. Patient was discharged from the emergency department in good condition with all questions answered. See disposition below. I have given the patient strict written and verbal instructions about care at home, follow-up, and signs and symptoms of worsening of condition and they did verbalize understanding. CRITICAL CARE:   None    CONSULTS:  None    PROCEDURES:  None    FINAL IMPRESSION      1. Diarrhea, unspecified type    2. Left upper quadrant abdominal pain    3. Gastroesophageal reflux disease without esophagitis          DISPOSITION/PLAN     1. Diarrhea, unspecified type    2. Left upper quadrant abdominal pain    3.  Gastroesophageal reflux disease without esophagitis        PATIENT REFERRED TO:  Ami Weller MD  [address] [place] [address]  [phone]    Schedule an appointment as soon as possible for a visit         DISCHARGE MEDICATIONS:  New Prescriptions    No medications on file       (Please note that portions of this note were completed with a voice recognition program.  Efforts were made to edit the dictations but occasionally words are mis-transcribed.)    Daryle Perone, PA-C 05/17/22 7:42 PM    Daryle Perone, PA-C Daryle Perone, PA-C  05/17/22 1944

## 2022-05-17 NOTE — ED NOTES
Pt resting in wheelchair. Denies any feelings of diarrhea. Reports its only when he eats.  Will monitor      Effie Corbett RN  05/17/22 [id number]
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
## 2022-05-31 ENCOUNTER — PHARMACY VISIT (OUTPATIENT)
Dept: INTERNAL MEDICINE CLINIC | Age: 52
End: 2022-05-31
Payer: MEDICARE

## 2022-05-31 DIAGNOSIS — E11.65 TYPE 2 DIABETES MELLITUS WITH HYPERGLYCEMIA, WITH LONG-TERM CURRENT USE OF INSULIN (HCC): ICD-10-CM

## 2022-05-31 DIAGNOSIS — Z79.4 TYPE 2 DIABETES MELLITUS WITH HYPERGLYCEMIA, WITH LONG-TERM CURRENT USE OF INSULIN (HCC): ICD-10-CM

## 2022-05-31 PROCEDURE — G0108 DIAB MANAGE TRN  PER INDIV: HCPCS | Performed by: INTERNAL MEDICINE

## 2022-05-31 NOTE — PATIENT INSTRUCTIONS
1. Keep taking Toujeo 85 units two times daily    2. Try out this new method for taking your Humalog   -If eating meal, before eating: correction scale + carbs   -If skipping meal: correction scale only    Correction Scale  GROUP 5    Blood Sugar Dose fast acting insulin    None added   140-199 5 units   200-249 10 units   250-299 15 units   300-349 20 units   350-399 25 units   400 and above 30 units     Meal Scale  Carb Servings Dose fast acting insulin   1 serving 5 units   2 serving 10 units   3 serving 15 units   4 serving 20 units   5 serving 25 units   6 serving 30 units   7 serving 35 units       Banana: 27g of carbs = 2 servings  Oatmeal packet: 27g or carbs = 2 servings  Orange chicken w/ rice = 100g of carbs = 6-7 servings    3. We will plan on increasing to Trulicity 4.8LB for your next fill    4. Blood Sugar Goals:  -Fasting AM:    -Before lunch/dinner:    -2 hours after eating/at bedtime: 100-180   -Average B    5.  Be sure to get rescheduled with your foot doctor    Call if you are having any questions or issues: [phone]

## 2022-05-31 NOTE — PROGRESS NOTES
[facility]  [facility] [address] [address] Zel[address]choa, Nikole SnowdenThe [facility], [address]  [phone] (phone)  [phone] (fax)    Patient ID: Keely Mcclelland 1970  Referring Provider: Dr. Tigist Green     Patient's name and  were verified. Subjective:    He presents for His follow-up diabetic visit. He has type 2 diabetes mellitus. Home regimen includes: Insulin and SGLT-2 inhibitors He is noncompliant some of the time. Assessment:     Lab Results   Component Value Date    LABA1C 8.2 2022    LABA1C 7.1 2022    BUN 11 2022    CREATININE 0.7 2022     Vitals:    22 1354   BP: 139/73   Pulse: (!) 108   Temp: 98.6 °F (37 °C)     Wt Readings from Last 3 Encounters:   22 223 lb (101.2 kg)   22 223 lb (101.2 kg)   22 223 lb (101.2 kg)     Ht Readings from Last 3 Encounters:   22 5' 6\" (1.676 m)   22 5' 6\" (1.676 m)   22 5' 6\" (1.676 m)       Diabetes Pharmacotherapy:  Toujeo 85 units BID, skipping 1-2x per week   -Sometimes takes 75 units BID  Humalog 35-40 units before eating   -Only gives Humalog if BG is above 200  Jardiance 17NI daily  Trulicity 3 mg once weekly x 2 months    Glucose Trends:   Current monitoring regimen: Dexcom CGM - checks 4+ times daily  Home blood sugar trends:    -V. High: 21%, High: 40%, Target: 38%, Low: 1%, V. Low: 0%   -Average glucose: 200 mg/dL  Any episodes of hypoglycemia?  yes - 5-7x per week   -Highest risk before bedtime or overnight   -Treats with orange juice, 1-2 glucose gel packets, regular soda    Lifestyle Factors:   Previous visit with dietician: yes - 2021  Current diet: B: banana +/- 2 packets oatmeal            L: pork, mac N cheese, corn bread OR half slice pizza                       D: variable                       Beverages: cut out Zero sugar soda, water with flavor packets  Current exercise: [employer] a couple days per week    Health Maintenance:  Eye exam current (within one year): no; scheduled for 22 with Dr. Shelia Lemus  Any history of foot problems? yes - right AKA  Foot exam up to date: yes Date: 9/2021  Immunizations up to date:    Pneumonia: yes- until age 71   COVID-22: no  The ASCVD Risk score (Ayala Haque., et al., 2013) failed to calculate for the following reasons: The patient has a prior MI or stroke diagnosis   Last panel - LDL:   Lab Results   Component Value Date    LDLCALC 92 10/29/2021   Taking ASA:  Yes   Taking statin: Yes-atorvastatin  Last microalbumin/creatinine ratio: 14 (10/2018)  Taking ACE/ARB: Yes-lisinopril    Focus:   Diabetes Education. Last A1C: 8.2% (4/26/22). CGM time in range is below goal at 38%. Encouraged Shayy Mills to take Toujeo more consistently; discussed impact of missed doses on next 24 hours of blood sugars. Extensively discussed diet, which contains a moderate-high amount of carbohydrates. Shayy Mills does not have consistent carb intake and does not cover meals unless baseline BG is >200mg/dL. Shayy Mills is able to estimate carbohydrate content in meals; discussed adjustment of Humalog dose based on carb content of meal and starting BG. Provided scales for Shayy Mills to try for the next week. Finally, Shayy Mills reports that he's tolerating Trulicity well and would be amenable to a dose increase. He has off/on diarrhea that he does not attribute to Trulicity. Curriculum Area Focus: Glucose checks/goals and Carbohydrate counting/meal planning  DSME PLAN:     Behavioral Goal(s)  Monitoring: Check blood sugar at least 4 times per day - met      1. Take Toujeo 85 units two times daily consistently    2.  Try out this new method for taking your Humalog   -If eating meal, before eating: correction scale + carbs   -If skipping meal: correction scale only    Correction Scale  GROUP 5    Blood Sugar Dose fast acting insulin    None added   140-199 5 units   200-249 10 units   250-299 15 units   300-349 20 units   350-399 25 units   400 and above 30 units     Meal Scale  Carb Servings Dose fast acting insulin   1 serving 5 units   2 serving 10 units   3 serving 15 units   4 serving 20 units   5 serving 25 units   6 serving 30 units   7 serving 35 units       Banana: 27g of carbs = 2 servings  Oatmeal packet: 27g or carbs = 2 servings  Orange chicken w/ rice = 100g of carbs = 6-7 servings    3. We will plan on increasing to Trulicity 5.7VH for your next fill (Rx sent)    4. Blood Sugar Goals:  -Fasting AM:    -Before lunch/dinner:    -2 hours after eating/at bedtime: 100-180   -Average B    5. Be sure to get rescheduled with your foot doctor      Meter download, medications, PMH and nursing assessment reviewed. Den Costelloier states He is willing to participate in this plan of care and verbalized understanding of all instructions provided. Teach back used to verify comprehension. Follow-up: 1 week IM physician, 2.5 month DM Clinic RN     Total time involved in direct patient education: 60 minutes. For Gavin Cadena in place:   Yes   Recommendation Provided To: Patient/Caregiver: 1 via In person   Intervention Detail: Dose Adjustment: 1, reason: Therapy Optimization   Gap Closed?: No    Intervention Accepted By: Patient/Caregiver: 1   Time Spent (min): [address], PharmD, [facility]  Internal Medicine Clinical Pharmacist  [phone]

## 2022-06-07 ENCOUNTER — OFFICE VISIT (OUTPATIENT)
Dept: INTERNAL MEDICINE CLINIC | Age: 52
End: 2022-06-07
Payer: MEDICARE

## 2022-06-07 VITALS
DIASTOLIC BLOOD PRESSURE: 90 MMHG | TEMPERATURE: 98.8 F | SYSTOLIC BLOOD PRESSURE: 135 MMHG | HEIGHT: 66 IN | BODY MASS INDEX: 35.99 KG/M2 | HEART RATE: 92 BPM

## 2022-06-07 VITALS — TEMPERATURE: 98.6 F | SYSTOLIC BLOOD PRESSURE: 139 MMHG | DIASTOLIC BLOOD PRESSURE: 73 MMHG | HEART RATE: 108 BPM

## 2022-06-07 DIAGNOSIS — Z79.4 UNCONTROLLED TYPE 2 DIABETES MELLITUS WITH HYPERGLYCEMIA, WITH LONG-TERM CURRENT USE OF INSULIN (HCC): Primary | Chronic | ICD-10-CM

## 2022-06-07 DIAGNOSIS — I10 ESSENTIAL HYPERTENSION: ICD-10-CM

## 2022-06-07 DIAGNOSIS — E03.8 OTHER SPECIFIED HYPOTHYROIDISM: ICD-10-CM

## 2022-06-07 DIAGNOSIS — Z89.611 HISTORY OF ABOVE-KNEE AMPUTATION OF RIGHT LOWER EXTREMITY (HCC): ICD-10-CM

## 2022-06-07 DIAGNOSIS — E11.65 UNCONTROLLED TYPE 2 DIABETES MELLITUS WITH HYPERGLYCEMIA, WITH LONG-TERM CURRENT USE OF INSULIN (HCC): Primary | Chronic | ICD-10-CM

## 2022-06-07 DIAGNOSIS — E78.5 HYPERLIPIDEMIA LDL GOAL <70: ICD-10-CM

## 2022-06-07 DIAGNOSIS — E73.9 LACTOSE INTOLERANCE: ICD-10-CM

## 2022-06-07 PROCEDURE — G8427 DOCREV CUR MEDS BY ELIG CLIN: HCPCS | Performed by: STUDENT IN AN ORGANIZED HEALTH CARE EDUCATION/TRAINING PROGRAM

## 2022-06-07 PROCEDURE — 3052F HG A1C>EQUAL 8.0%<EQUAL 9.0%: CPT | Performed by: STUDENT IN AN ORGANIZED HEALTH CARE EDUCATION/TRAINING PROGRAM

## 2022-06-07 PROCEDURE — 1036F TOBACCO NON-USER: CPT | Performed by: STUDENT IN AN ORGANIZED HEALTH CARE EDUCATION/TRAINING PROGRAM

## 2022-06-07 PROCEDURE — 2022F DILAT RTA XM EVC RTNOPTHY: CPT | Performed by: STUDENT IN AN ORGANIZED HEALTH CARE EDUCATION/TRAINING PROGRAM

## 2022-06-07 PROCEDURE — G8417 CALC BMI ABV UP PARAM F/U: HCPCS | Performed by: STUDENT IN AN ORGANIZED HEALTH CARE EDUCATION/TRAINING PROGRAM

## 2022-06-07 PROCEDURE — 3017F COLORECTAL CA SCREEN DOC REV: CPT | Performed by: STUDENT IN AN ORGANIZED HEALTH CARE EDUCATION/TRAINING PROGRAM

## 2022-06-07 PROCEDURE — 99214 OFFICE O/P EST MOD 30 MIN: CPT | Performed by: STUDENT IN AN ORGANIZED HEALTH CARE EDUCATION/TRAINING PROGRAM

## 2022-06-07 RX ORDER — DULAGLUTIDE 4.5 MG/.5ML
4.5 INJECTION, SOLUTION SUBCUTANEOUS WEEKLY
Qty: 4 PEN | Refills: 3 | Status: SHIPPED | OUTPATIENT
Start: 2022-06-07 | End: 2022-08-29 | Stop reason: SDUPTHER

## 2022-06-07 RX ORDER — LISINOPRIL 10 MG/1
10 TABLET ORAL DAILY
Qty: 90 TABLET | Refills: 1 | Status: SHIPPED | OUTPATIENT
Start: 2022-06-07 | End: 2022-08-29 | Stop reason: SDUPTHER

## 2022-06-07 RX ORDER — OMEPRAZOLE 40 MG/1
40 CAPSULE, DELAYED RELEASE ORAL DAILY
COMMUNITY
Start: 2022-05-20 | End: 2022-07-26

## 2022-06-07 RX ORDER — FAMOTIDINE 40 MG/1
40 TABLET, FILM COATED ORAL NIGHTLY
COMMUNITY
Start: 2022-05-20 | End: 2022-07-26

## 2022-06-07 NOTE — PROGRESS NOTES
1970    Chief Complaint   Patient presents with    Diabetes     ER 5/17 - diarrhea / last A1c 8.2 on 4/26    Hyperlipidemia    Hypothyroidism       HPI  The patient is a 45year old male with a past medical history of IDDMII, HTN, HLD, GERD, Bipolar/Depression who presents to clinic for diabetes follow up. The patient previously presented to clinic 5/17/2022 for GI symptoms involving epigastric tenderness, diarrhea with black stools, and dark and foul smelling urine. Symptoms were associated with food. At the time the patient was sent to the ED. CT abdomen/pelvis was negative for acute process but did show fatty infiltration of the liver. He received Pepcid, Reglan and IVF with noted improvement in his symptoms. He was instructed to follow up with Dr. Lorna Recio, outpatient GI with plans for EGD 6/15/2022. Since then he reports symptoms have remained. Of note, patient has history of lactose intolerance. Reports not avoiding lactose. Takes \"dietary supplements\". Discussed importance of avoiding lactate, he reports \"I will eat my cheese\". Diabetes Mellitus -  Last HgA1c 8.2 4/26/2022 - uncontrolled, on trulicity, jardiance, glargine and lispro. Endorses occasional compliance. Patient checks FSBS 3 times a day. FSBS ranges , see media section for specific numbers (if patient brought to visit today). States he has had episodes of hypoglycemia, dropping into 70-80. But reports he does not carb count, and just takes insulin at a flat rate. He endorses occasionally decreasing long acting insulin. Most recent eye 6/2/2022, with Dr. Zipporah Duverney noted no DM retinopathy. Patient reminded to have eye exam done yearly. Normal renal function, eGFR >90 5/17/2022, no recent microalbumin. Patient denies foot lesions. Last foot exam  6/7/2022 with decreased sensation, dry skin and unkempt digits. History of diabetic neuropathy. No autonomic dysfunction. LDL 92 10/29/2021.   Patient is on an Ace Inhibitor, and statin. HTN - Controlled. Today blood pressure is 160/72. Repeat blood pressure 135/90. States blood pressure typically runs 130-150 SBP at home. Reports compliance with home medication. HLD - Reports compliance with home medication. Hypothyroidism - Does endorse lethargy and daytime sleepiness but attributes this to work schedule. Reports compliance with synthroid. History of right AKA - Patient requests evaluation for electric/power chair. He states he has had increased weakness and shortness of breath. Past Medical History:   Diagnosis Date    Atherosclerotic heart disease of native coronary artery with unspecified angina pectoris 1/18/2022    Bipolar 2 disorder [facility])     previously followed with Dr. Zari Arrieta and Negin Fuentes in [facility] BPH (benign prostatic hyperplasia)     COPD (chronic obstructive pulmonary disease) ([address]     Diabetes mellitus type 2, uncontrolled ([address]     HgbA1c on 4/2/2019 was 9.1.     Diabetic peripheral neuropathy (HCC)     Diabetic polyneuropathy ([address]     Diabetic ulcer of right foot associated with type 2 diabetes mellitus ([address] 12/10/2015    Essential hypertension     \"never been on b/p medication that I know of\"    GERD (gastroesophageal reflux disease)     Hammer toe of left foot     Heart murmur     denies any chest pain or palpitations    History of tobacco abuse     Hx of AKA (above knee amputation), right ([address] 04/18/2019    Dr. Dill Constant    Hyperlipidemia     Macular edema, diabetic, bilateral ([address] 05/04/2018    Dr. Clementine Martinez referred to retina specialist for 2nd opinion    Marijuana abuse in remission     Melena     MRSA (methicillin resistant staph aureus) culture positive     Onychomycosis     Other disorders of kidney and ureter in diseases classified elsewhere     Sleep apnea     no cpap    Thyroid disease     WD-Skin ulcer of fourth toe of right foot with necrosis of bone ([address] 6/29/2016 Past Surgical History:   Procedure Laterality Date    ABDOMEN SURGERY      ABSCESS DRAINAGE Right     foot    AMPUTATION ABOVE KNEE Right 4/18/2019    RIGHT ABOVE KNEE AMPUTATION performed by Loreto Nair MD at [address] [facility], LAPAROSCOPIC N/A 4/1/2020    ROBOTIC CHOLECYSTECTOMY performed by Maria Esther Mendez MD at [address] N/A 7/20/2020    CYSTOSCOPY performed by Aure Ruby MD at [address] [facility] [place] 8/21/2020    CYSTOSCOPY, UROLIFT performed by Aure Ruby MD at [address], ESOPHAGUS      ENDOSCOPY, COLON, DIAGNOSTIC      FOOT DEBRIDEMENT Right 07/01/2016    I & D    GASTROCNEMIUS RECESSION Left 11/12/2021    LEFT LEG GASTROCS RECESSION performed by Leonidas Barakat MD at [address] [facility] Right 2/18/2019    I&D RIGHT STUMP performed by Loreto Nair MD at [address] [facility] Right 07/20/2016    LEG AMPUTATION BELOW KNEE Right 4/4/2019    I&D AND REVISION OF AMPUTATION RIGHT LEG performed by Loreto Nair MD at [facility] Right 1/14/15    sole of foot I&D    MD DRAIN INFECT SHOULDER BURSA Left 8/18/2017    LEFT SHOULDER INCISION AND DRAINAGE performed by Loreto Nair MD at [address] [facility] OFFICE/OUTPT [facility] Right 9/20/2018    EXCISIONAL DEBRIDEMENT RIGHT BKA STUMP performed by Mulu Catherine MD at [address] Right 1/16/15    2nd toe with wound vac applied    UPPER GASTROINTESTINAL ENDOSCOPY N/A 3/3/2020    EGD DILATION SAVORY performed by Kalpana Barrios MD at [facility]  ? when       Current Outpatient Medications   Medication Sig Dispense Refill    Dulaglutide (TRULICITY) 4.5 CJ/3.7HZ SOPN Inject 4.5 mg into the skin once a week *dose increase 4 pen 3    omeprazole (PRILOSEC) 40 MG delayed release capsule Take 40 mg by mouth daily       famotidine (PEPCID) 40 MG tablet Take 40 mg by mouth nightly       lisinopril (PRINIVIL;ZESTRIL) 10 MG tablet Take 1 tablet by mouth daily 90 tablet 1    gabapentin (NEURONTIN) 400 MG capsule Change to 400 mg TID 90 capsule 1    empagliflozin (JARDIANCE) 25 MG tablet TAKE 1 TABLET BY MOUTH ONCE DAILY. 30 tablet 5    nitroGLYCERIN (NITROSTAT) 0.3 MG SL tablet Place 1 tablet under the tongue every 5 minutes as needed for Chest pain up to max of 3 total doses.  If no relief after 1 dose, call 911. 30 tablet 3    Insulin Glargine, 2 Unit Dial, 300 UNIT/ML SOPN Change to 85 U in am , 80 U PM  and every 10 days increase by 5 units both times, to get am sugars 150-200 (Patient taking differently: Change to 85 U in am , 85 U PM) 15 pen 2    insulin lispro (HUMALOG KWIKPEN U-200) 200 UNIT/ML SOPN pen 40 units Plus scale - Max dose 160 units per day (Patient taking differently: 40 units Plus scale before meals; only takes if BG >200 - Max dose 160 units per day) 30 pen 2    atorvastatin (LIPITOR) 40 MG tablet Take 1 tablet by mouth nightly 30 tablet 3    levothyroxine (SYNTHROID) 50 MCG tablet Take 1 tablet by mouth daily 90 tablet 1    metoprolol tartrate (LOPRESSOR) 50 MG tablet Take 1 tablet by mouth 2 times daily 90 tablet 3    Insulin Pen Needle (TRUEPLUS PEN NEEDLES) 31G X 8 MM MISC USE AS DIRECTED 200 each 1    sertraline (ZOLOFT) 100 MG tablet Take 100 mg by mouth daily       lisinopril (PRINIVIL;ZESTRIL) 5 MG tablet Take 1 tablet by mouth daily 90 tablet 1    Calcium-Phosphorus-Vitamin D (CALCIUM/D3 ADULT GUMMIES PO) Take 2 tablets by mouth daily      Continuous Blood Gluc Transmit (DEXCOM G6 TRANSMITTER) MISC 1 Device by Does not apply route every 3 months 1 each 3    Continuous Blood Gluc Sensor (DEXCOM G6 SENSOR) MISC 1 Device by Does not apply route every 14 days 9 each 3    Continuous Blood Gluc  (DEXCOM G6 ) LIANNE 1 Device by Does not apply route 4 times daily (before meals and nightly) 1 Device 0    ARIPiprazole (ABILIFY) 5 MG tablet Take 5 mg by mouth daily        No current facility-administered medications for this visit. Allergies   Allergen Reactions    Pcn [Penicillins] Shortness Of Breath, Nausea And Vomiting and Other (See Comments)     Does not remember reactions. Has TOLERATED amoxicillin and several different cephalosporins.  Actos [Pioglitazone] Swelling    Clindamycin/Lincomycin Itching    Vancomycin Hives      Rash, with erythematous plaques to abdomen, shoulders, and proximal upper extremities with pruritis, persisted with 2nd dose administered slower.  Metformin And Related Swelling       Social History     Socioeconomic History    Marital status:      Spouse name: Not on file    Number of children: 2    Years of education: 15    Highest education level: Not on file   Occupational History    Not on file   Tobacco Use    Smoking status: Former Smoker     Packs/day: 0.00     Years: 10.00     Pack years: 0.00     Types: Cigars     Start date: 1985     Quit date:      Years since quittin.7    Smokeless tobacco: Never Used   Vaping Use    Vaping Use: Former    Substances: Nicotine, Flavoring   Substance and Sexual Activity    Alcohol use: Not Currently     Alcohol/week: 0.0 standard drinks    Drug use: No     Comment: 30 YEARS AGO    Sexual activity: Yes     Partners: Female   Other Topics Concern    Not on file   Social History Narrative    Not on file     Social Determinants of Health     Financial Resource Strain: Low Risk     Difficulty of Paying Living Expenses: Not very hard   Food Insecurity: No Food Insecurity    Worried About Running Out of Food in the Last Year: Never true    Gwen of Food in the Last Year: Never true   Transportation Needs: No Transportation Needs    Lack of Transportation (Medical): No    Lack of Transportation (Non-Medical):  No   Physical Activity:     Days of Exercise per Week: Not on file    Minutes of Exercise per Session: Not on file   Stress:     Feeling of Stress : Not on file   Social Connections:     Frequency of Communication with Friends and Family: Not on file    Frequency of Social Gatherings with Friends and Family: Not on file    Attends Holiness Services: Not on file    Active Member of Clubs or Organizations: Not on file    Attends Club or Organization Meetings: Not on file    Marital Status: Not on file   Intimate Partner Violence:     Fear of Current or Ex-Partner: Not on file    Emotionally Abused: Not on file    Physically Abused: Not on file    Sexually Abused: Not on file   Housing Stability:     Unable to Pay for Housing in the Last Year: Not on file    Number of [place] in the Last Year: Not on file    Unstable Housing in the Last Year: Not on file       Family History   Problem Relation Age of Onset    Diabetes Mother     Other Mother         pneumonia, H1N1    Depression Mother     Early Death Mother     High Blood Pressure Mother     High Cholesterol Mother     Vision Loss Maternal Grandmother     Arthritis Maternal Grandfather     Heart Disease Maternal Grandfather        Review of Systems - General ROS: negative for - chills or fever  Psychological ROS: negative for - anxiety and depression  Hematological and Lymphatic ROS: No history of blood clots or bleeding disorder. Respiratory ROS: no cough, shortness of breath, or wheezing  Cardiovascular ROS: no chest pain or dyspnea on exertion, vacuuming. Gastrointestinal ROS: no abdominal pain, dark diarrhea, increased flatulence. Genito-Urinary ROS: no dysuria, trouble voiding, or hematuria  Musculoskeletal ROS: negative for - muscle pain or muscular weakness,  Neurological ROS: negative for - headaches, numbness/tingling (chronic), seizures or weakness  Dermatological ROS: negative for - rash or skin lesion changes    Blood pressure (!) 135/90, pulse 92, temperature 98.8 °F (37.1 °C), height 5' 6\" (1.676 m).     Physical Examination: General appearance -alert, well appearing, obese and in no distress  Mental status - alert, oriented to person, place, and time  Head - atraumatic, normocephalic  Eyes - pupils equal and reactive to light, extraocular muscles intact  Ears - external ears are normal, hearing is grossly normal  Mouth - oral pharynx clear, mucous membranes moist  Neck - supple, no significant adenopathy  Chest - clear to auscultation, no wheezes, rales or rhonchi, symmetric air entry  Heart - normal rate, regular rhythm, normal S1, S2, no murmurs, rubs, clicks or gallops  Abdomen -soft, nontender, nondistended  Neurological - alert, oriented, cranial nerves II-XII intact, motor and sensation are grossly intact bilateral upper and lower extremities. Extremities - peripheral pulses normal, +1 LLE edema, left fifth toe amputated, right AKA, 2nd and 3rd digit of left hand with healing callus. Skin - warm and dry    Diagnostic data:   I have reviewed recent diagnostic testing including labs with patient today. Assessment and Plan:     Diagnosis Orders   1. Uncontrolled type 2 diabetes mellitus with hyperglycemia, with long-term current use of insulin ([address]  Microalbumin / Creatinine Urine Ratio     DIABETES FOOT EXAM    [facility] - [place], Milderd Siemens, [facility], [facility], [address]   2. Essential hypertension  lisinopril (PRINIVIL;ZESTRIL) 10 MG tablet    Basic Metabolic Panel   3. Hyperlipidemia LDL goal <70     4. Other specified hypothyroidism     5. Lactose intolerance     6. Diarrhea due to malabsorption     7. History of above-knee amputation of right lower extremity ([address]  [address]       1. IDDMII - Last HgbA1c 8.2% on 4/26/2022. Diabetic regimen includes Toujeo 85 units BID, Humalog 35-40 units prior to meals, jardiance 25 mg p.o daily and trulicity 4.5 mg once weekly. Endorses occasional noncompliance.  Reports he does not carb count and wants flat rate for insuline.   - Encourage life style modifications, healthy diet and exercise. - Advised recording a blood sugar log and bring it on his next visit. - Addressed importance of medication complaince. - Continue gabapentin for diabetic neuropathy.  - Check Microalbumin/Cr level, lipid panel.   - Take Iispro 40 units with meals and sliding scale correction. - Referred to Podiatry to assist with diabetic foot management. - Diarrhea likely NOT associated trulicity. History of lactose intolerance, endorses and proud of noncompliance. 2. HTN - controlled. Antihypertensive regimen includes metoprolol and lisinopril. Reports compliance. Home blood pressure typically runs 130-150 SBP. - Continue metoprolol 50 mg p.o daily. Increase 10 mg p.o daily.   - Repeat BMP in 3 months  - Encouraged to record blood pressures daily and bring it on his next visit. 3. HLD - unclear if controlled. Unable to complete previously ordered lipid panel. Takes atorvastatin at home. Reports compliance. - Instructed to complete lipid panel labs  - Continue atorvastatin 40 mg p.o daily. 4. Hypothyroidism - Last TSH 3.450 2/26/2021. Unable to complete previously complete TSH/FT4 labwork. Takes synthroid. Reports compliance. - Instructed to complete TSH/FT4 labwork. - Continue synthroid 50 mcg p.o daily. 5. Lactose intolerance - see below. 6. Diarrhea - Likely secondary to lactose intolerance. Endorses not avoiding lactose in his diet. Continues to report \"dark diarrhea\". Reports taking pepto-bismolol and imodium. Notes improvement with imodium, noting 1 large loose BM.    - Encouraged lactose intolerance. Patient adamant to eat what he wants. - Plan for EGD 6/15/2022.    7. History of right AKA - Patient requests power chain. He has right AKA, and is wheel chair bound. Notes increased SOB and weakness. Referral made to Occupational therapy for electric/power chair evaluation.        [employer] [facility] PROFESSIONAL [employer]  [facility]'S INTERNAL [place] [address] [address]  [facility] [address]  Dept: [phone]  Dept Fax: [phone] : [phone]  Electronically signed by Mason Lundberg DO on 6/7/2022 at 3:01 PM

## 2022-06-07 NOTE — PATIENT INSTRUCTIONS
Patient instructed to try to avoid lactose in diet. Take Lispro 40 units with meals and correction sliding scale. Lisinopril 10 mg once a day. Check blood pressure and bring log.

## 2022-06-09 DIAGNOSIS — G62.9 NEUROPATHY: ICD-10-CM

## 2022-06-14 RX ORDER — GABAPENTIN 400 MG/1
CAPSULE ORAL
Qty: 90 CAPSULE | Refills: 1 | OUTPATIENT
Start: 2022-06-14 | End: 2022-08-25

## 2022-06-15 ENCOUNTER — ANESTHESIA (OUTPATIENT)
Dept: ENDOSCOPY | Age: 52
End: 2022-06-15
Payer: MEDICARE

## 2022-06-15 ENCOUNTER — ANESTHESIA EVENT (OUTPATIENT)
Dept: ENDOSCOPY | Age: 52
End: 2022-06-15
Payer: MEDICARE

## 2022-06-15 ENCOUNTER — HOSPITAL ENCOUNTER (OUTPATIENT)
Age: 52
Setting detail: OUTPATIENT SURGERY
Discharge: HOME | End: 2022-06-15
Attending: INTERNAL MEDICINE | Admitting: INTERNAL MEDICINE
Payer: MEDICARE

## 2022-06-15 VITALS
HEIGHT: 66 IN | HEART RATE: 89 BPM | BODY MASS INDEX: 36.16 KG/M2 | RESPIRATION RATE: 16 BRPM | OXYGEN SATURATION: 97 % | WEIGHT: 225 LBS | TEMPERATURE: 97.2 F | DIASTOLIC BLOOD PRESSURE: 67 MMHG | SYSTOLIC BLOOD PRESSURE: 127 MMHG

## 2022-06-15 LAB — GLUCOSE BLD-MCNC: 206 MG/DL (ref 70–108)

## 2022-06-15 PROCEDURE — 6360000002 HC RX W HCPCS: Performed by: NURSE ANESTHETIST, CERTIFIED REGISTERED

## 2022-06-15 PROCEDURE — 82948 REAGENT STRIP/BLOOD GLUCOSE: CPT

## 2022-06-15 PROCEDURE — 3700000000 HC ANESTHESIA ATTENDED CARE: Performed by: INTERNAL MEDICINE

## 2022-06-15 PROCEDURE — 2580000003 HC RX 258: Performed by: INTERNAL MEDICINE

## 2022-06-15 PROCEDURE — C1726 CATH, BAL DIL, NON-VASCULAR: HCPCS | Performed by: INTERNAL MEDICINE

## 2022-06-15 PROCEDURE — 3609017700 HC EGD DILATION GASTRIC/DUODENAL STRICTURE: Performed by: INTERNAL MEDICINE

## 2022-06-15 PROCEDURE — 2500000003 HC RX 250 WO HCPCS: Performed by: NURSE ANESTHETIST, CERTIFIED REGISTERED

## 2022-06-15 PROCEDURE — 7100000010 HC PHASE II RECOVERY - FIRST 15 MIN: Performed by: INTERNAL MEDICINE

## 2022-06-15 PROCEDURE — 7100000011 HC PHASE II RECOVERY - ADDTL 15 MIN: Performed by: INTERNAL MEDICINE

## 2022-06-15 RX ORDER — LIDOCAINE HYDROCHLORIDE 20 MG/ML
INJECTION, SOLUTION INFILTRATION; PERINEURAL PRN
Status: DISCONTINUED | OUTPATIENT
Start: 2022-06-15 | End: 2022-06-15 | Stop reason: SDUPTHER

## 2022-06-15 RX ORDER — PROPOFOL 10 MG/ML
INJECTION, EMULSION INTRAVENOUS PRN
Status: DISCONTINUED | OUTPATIENT
Start: 2022-06-15 | End: 2022-06-15 | Stop reason: SDUPTHER

## 2022-06-15 RX ORDER — SODIUM CHLORIDE 9 MG/ML
25 INJECTION, SOLUTION INTRAVENOUS PRN
Status: DISCONTINUED | OUTPATIENT
Start: 2022-06-15 | End: 2022-06-15 | Stop reason: HOSPADM

## 2022-06-15 RX ADMIN — SODIUM CHLORIDE 25 ML: 9 INJECTION, SOLUTION INTRAVENOUS at 10:54

## 2022-06-15 RX ADMIN — LIDOCAINE HYDROCHLORIDE 100 MG: 20 INJECTION, SOLUTION INFILTRATION; PERINEURAL at 13:07

## 2022-06-15 RX ADMIN — PROPOFOL 250 MG: 10 INJECTION, EMULSION INTRAVENOUS at 13:07

## 2022-06-15 ASSESSMENT — PAIN SCALES - GENERAL
PAINLEVEL_OUTOF10: 0

## 2022-06-15 ASSESSMENT — ENCOUNTER SYMPTOMS: SHORTNESS OF BREATH: 1

## 2022-06-15 ASSESSMENT — PAIN - FUNCTIONAL ASSESSMENT: PAIN_FUNCTIONAL_ASSESSMENT: NONE - DENIES PAIN

## 2022-06-15 NOTE — PROGRESS NOTES
1320: Pt arrived too endo recovery room, no family. We need to call for his ride. 1348:  Pt is sitting up drinking fluids, no C/O N/V    1353:  Discharge instructions given to pt.  is on his way to sign discharge paper & take pt home.

## 2022-06-15 NOTE — ANESTHESIA POSTPROCEDURE EVALUATION
Department of Anesthesiology  Postprocedure Note    Patient: Manuela Mullins  MRN: [id number]  YOB: 1970  Date of evaluation: 6/15/2022  Time:  1:21 PM     Procedure Summary     Date: 06/15/22 Room / Location: [address] [place] [address] / [address] [place] [address]    Anesthesia Start: 2583 Anesthesia Stop: 36    Procedure: EGD DILATION BALLOON (N/A ) Diagnosis:       Melena      Esophageal dysphagia      Chronic GERD      (DYSPHAGIA, MELENA, GERD, HEARTBURN, LOWER UPPER QUANDRANT PAIN, ABDOMINAL PAIN)    Surgeons: Ying Gooden MD Responsible Provider: Pamela Christianson MD    Anesthesia Type: MAC ASA Status: 3          Anesthesia Type: No value filed. Everette Phase I: Everette Score: 10    Everette Phase II:      Last vitals: Reviewed and per EMR flowsheets.        Anesthesia Post Evaluation    Patient location during evaluation: bedside  Patient participation: complete - patient participated  Level of consciousness: awake  Airway patency: patent  Nausea & Vomiting: no vomiting and no nausea  Complications: no  Cardiovascular status: hemodynamically stable  Respiratory status: acceptable  Hydration status: stable

## 2022-06-15 NOTE — PROGRESS NOTES
EGD completed, tolerated well. Dilation complete. Balloon dilation 18, 19, 20 mm used. Photos taken. Scope  used.

## 2022-06-15 NOTE — H&P
[address] [place] [address]                       PREOPERATIVE HISTORY AND PHYSICAL    PATIENT NAME: Sandhya Flores                     :        1970  MED REC NO:   [id number]                           ROOM:  ACCOUNT NO:   [de-identified]                           ADMIT DATE: 06/15/2022  PROVIDER:     RICK Dewitt Bun OF PROCEDURE:  06/15/2022    PROCEDURE:  Esophagogastroduodenoscopy. INDICATION:  The patient is a 47-year-old pleasant male, complains of  left upper quadrant abdominal pain. The pain comes and goes. Complains  of intermittent solid food dysphagia. Denied any dysphagia for liquids. Denied any painful swallowing. Complains of black stools at times. Loose bowel movements at times. Denied any bright red blood per rectum. He is undergoing further evaluation. PAST MEDICAL HISTORY:  Atherosclerotic heart disease, bipolar disorder,  benign prostatic hypertrophy, chronic obstructive pulmonary disease,  diabetes mellitus type 2, diabetic neuropathy, diabetic ulcer of the  right foot, hypertension, gastroesophageal reflux disease, macular  edema, melena, onychomycosis, hypothyroidism. PAST SURGICAL HISTORY:  Above-the-knee amputation of the right lower  extremity, abdominal surgery, abscess drainage, cholecystectomy,  cystoscopy, esophageal dilation, foot debridement, left leg  gastrocnemius recession, incision and drainage, left amputation below  the knee, toe amputation, EGD and dilation, wisdom teeth extraction. MEDICATIONS:  Reviewed. PHYSICAL EXAMINATION:  VITAL SIGNS:  Temperature 97.2, pulse 72, respiratory rate 18, blood  pressure 125/56. HEART:  Regular. Normal S1 and S2. No S3.  LUNGS:  Equal to expansion on inspection. [address] air entry bilaterally. ABDOMEN:  Soft. Normoactive bowel sounds. IMPRESSION:  A 45year-old pleasant male who has difficulty swallowing.    He is undergoing further evaluation. RECOMMENDATIONS:  Keep the patient nothing by mouth. Proceed with EGD  and possible dilation. Discussed with the patient regarding EGD and  dilation, its indications and complications including, but not limited  to perforation, bleeding, infection, adverse reaction to medicine, very  slight chance of missing significant lesions. The patient expressed his  understanding. Further plans pending the above test results.         Thony Lewis M.D.    D: 06/15/2022 13:03:53       T: 06/15/2022 13:08:41     [email or web address]  Job#: [id number]     Doc#: [id number]    CC:

## 2022-06-15 NOTE — ANESTHESIA PRE PROCEDURE
Department of Anesthesiology  Preprocedure Note       Name:  Ayleen Stoll   Age:  45 y.o.  :  1970                                          MRN:  [id number]         Date:  6/15/2022      Surgeon: Gena Gorman):  Lnydsey Rangel MD    Procedure: Procedure(s):  EGD    Medications prior to admission:   Prior to Admission medications    Medication Sig Start Date End Date Taking? Authorizing Provider   Dulaglutide (TRULICITY) 4.5 US/5.9WQ SOPN Inject 4.5 mg into the skin once a week *dose increase 22   Phuc Ortega MD   omeprazole (PRILOSEC) 40 MG delayed release capsule Take 40 mg by mouth daily  22   Historical Provider, MD   famotidine (PEPCID) 40 MG tablet Take 40 mg by mouth nightly  22   Historical Provider, MD   lisinopril (PRINIVIL;ZESTRIL) 10 MG tablet Take 1 tablet by mouth daily 22   Michael Tracey DO   gabapentin (NEURONTIN) 400 MG capsule Change to 400 mg TID 22  Phuc Ortega MD   empagliflozin (JARDIANCE) 25 MG tablet TAKE 1 TABLET BY MOUTH ONCE DAILY. 22   Phuc Ortega MD   nitroGLYCERIN (NITROSTAT) 0.3 MG SL tablet Place 1 tablet under the tongue every 5 minutes as needed for Chest pain up to max of 3 total doses.  If no relief after 1 dose, call 911. 22   Phuc Ortega MD   Insulin Glargine, 2 Unit Dial, 300 UNIT/ML SOPN Change to 85 U in am , 80 U PM  and every 10 days increase by 5 units both times, to get am sugars 150-200  Patient taking differently: Change to 85 U in am , 85 U PM 22   Phuc Ortega MD   insulin lispro (HUMALOG KWIKPEN U-200) 200 UNIT/ML SOPN pen 40 units Plus scale - Max dose 160 units per day  Patient taking differently: 40 units Plus scale before meals; only takes if BG >200 - Max dose 160 units per day 22   Sameera Martinez MD   atorvastatin (LIPITOR) 40 MG tablet Take 1 tablet by mouth nightly 22   Sameera Martinez MD   levothyroxine (SYNTHROID) 50 MCG tablet Take 1 tablet by mouth daily 22   Sameera Martinez MD metoprolol tartrate (LOPRESSOR) 50 MG tablet Take 1 tablet by mouth 2 times daily 4/26/22   Phuc Ortega MD   Insulin Pen Needle (TRUEPLUS PEN NEEDLES) 31G X 8 MM MISC USE AS DIRECTED 3/21/22   Phuc Ortega MD   sertraline (ZOLOFT) 100 MG tablet Take 100 mg by mouth daily  1/31/22   Historical Provider, MD   lisinopril (PRINIVIL;ZESTRIL) 5 MG tablet Take 1 tablet by mouth daily 2/18/22   Shruthi Garcia MD   Calcium-Phosphorus-Vitamin D (CALCIUM/D3 ADULT GUMMIES PO) Take 2 tablets by mouth daily    Historical Provider, MD   Continuous Blood Gluc Transmit (DEXCOM G6 TRANSMITTER) MISC 1 Device by Does not apply route every 3 months 3/22/21   MARILYN Hunter CNP   Continuous Blood Gluc Sensor (DEXCOM G6 SENSOR) MISC 1 Device by Does not apply route every 14 days 3/22/21   MARILYN Hunter CNP   Continuous Blood Gluc  (DEXCOM G6 ) LIANNE 1 Device by Does not apply route 4 times daily (before meals and nightly) 3/22/21   MARILYN Hunter CNP   ARIPiprazole (ABILIFY) 5 MG tablet Take 5 mg by mouth daily  12/16/20   Historical Provider, MD       Current medications:    Current Facility-Administered Medications   Medication Dose Route Frequency Provider Last Rate Last Admin    0.9 % sodium chloride infusion  25 mL IntraVENous PRN Vicente Cooper  mL/hr at 06/15/22 1054 25 mL at 06/15/22 1054       Allergies: Allergies   Allergen Reactions    Pcn [Penicillins] Shortness Of Breath, Nausea And Vomiting and Other (See Comments)     Does not remember reactions. Has TOLERATED amoxicillin and several different cephalosporins.  Actos [Pioglitazone] Swelling    Clindamycin/Lincomycin Itching    Vancomycin Hives      Rash, with erythematous plaques to abdomen, shoulders, and proximal upper extremities with pruritis, persisted with 2nd dose administered slower.       Metformin And Related Swelling       Problem List:    Patient Active Problem List   Diagnosis Code    Status post below knee amputation of right lower extremity ([facility] Z89.511    Gait disturbance R26.9    Sebaceous cyst L72.3    Uncontrolled type 2 diabetes mellitus with hyperglycemia, with long-term current use of insulin ([facility]) E11.65, Z79.4    Severe bipolar II disorder, recent episode major depressive, remission ([facility]) F31.81    Bipolar 1 disorder ([facility]) F31.9    Leukocytosis D72.829    Lactic acidosis E87.2    Hyponatremia E87.1    Normocytic anemia D64.9    Obesity (BMI 30-39. 9) E66.9    History of noncompliance with medical treatment Z91.19    Essential hypertension I10    Tobacco dependence F17.200    Gastroesophageal reflux disease without esophagitis K21.9    History of marijuana use Z87.898    Physical debility R53.81    Anemia D64.9    Electrolyte imbalance E87.8    Type 2 diabetes mellitus with hyperglycemia, with long-term current use of insulin ([facility]) E11.65, Z79.4    Weakness R53.1    Infection of above knee amputation stump of right leg ([facility]) T87.43    Above knee amputation of right lower extremity ([facility]) J18.784H    Sepsis ([facility]) A41.9    Vitamin D deficiency E55.9    COPD exacerbation ([facility]) J44.1    Influenza B J10.1    Depression, recurrent ([facility]) F33.9    Major depressive disorder, recurrent ([facility]) F33.9    GI bleed K92.2    Elevated lipase R74.8    Other psychoactive substance abuse, uncomplicated ([facility]) U15.64    Calculus of gallbladder without cholecystitis without obstruction K80.20    Right upper quadrant abdominal pain R10.11    Pedal edema R60.0    MURALI (obstructive sleep apnea) G47.33    Shortness of breath R06.02    Hypothyroid E03.9    Anxiety and depression F41.9, F32. A    Dyspnea R06.00    Sinus tachycardia E25.9    Metabolic acidosis T59.1    Edema of left lower extremity R60.0    SOB (shortness of breath) on exertion R06.02    Intermittent palpitations R00.2    History of chest pain Z87.898    Dizziness, nonspecific R42    Hyperlipidemia LDL goal <70 E78.5    Neuropathy G62.9    Venous stasis ulcer of left lower leg with edema of left lower leg (HCC) I83.029, I83.892, L97.929, R60.9    Angina pectoris, unspecified I20.9    Atherosclerotic heart disease of native coronary artery with unspecified angina pectoris I25.119       Past Medical History:        Diagnosis Date    Atherosclerotic heart disease of native coronary artery with unspecified angina pectoris 1/18/2022    Bipolar 2 disorder ([facility])     previously followed with Dr. Letty Harmon and Kelly Hu in [facility] BPH (benign prostatic hyperplasia)     COPD (chronic obstructive pulmonary disease) ([address]     Diabetes mellitus type 2, uncontrolled ([address]     HgbA1c on 4/2/2019 was 9.1.     Diabetic peripheral neuropathy (HCC)     Diabetic polyneuropathy ([address]     Diabetic ulcer of right foot associated with type 2 diabetes mellitus ([address] 12/10/2015    Essential hypertension     \"never been on b/p medication that I know of\"    GERD (gastroesophageal reflux disease)     Hammer toe of left foot     Heart murmur     denies any chest pain or palpitations    History of tobacco abuse     Hx of AKA (above knee amputation), right ([address] 04/18/2019    Dr. John Gregory    Hyperlipidemia     Macular edema, diabetic, bilateral ([address] 05/04/2018    Dr. Tita Orozco referred to retina specialist for 2nd opinion    Marijuana abuse in remission     Melena     MRSA (methicillin resistant staph aureus) culture positive     Onychomycosis     Other disorders of kidney and ureter in diseases classified elsewhere     Sleep apnea     no cpap    Thyroid disease     WD-Skin ulcer of fourth toe of right foot with necrosis of bone ([address] 6/29/2016       Past Surgical History:        Procedure Laterality Date    ABDOMEN SURGERY      ABSCESS DRAINAGE Right     foot    AMPUTATION ABOVE KNEE Right 4/18/2019    RIGHT ABOVE KNEE AMPUTATION performed by Velvet Sheridan MD at [facility], LAPAROSCOPIC N/A 2020    ROBOTIC CHOLECYSTECTOMY performed by Sara Khan MD at [address] N/A 2020    CYSTOSCOPY performed by Rakesh Gregorio MD [facility]    CYSTOSCOPY, UROLIFT performed by Rakesh Gregorio MD at [address], ESOPHAGUS      ENDOSCOPY, COLON, DIAGNOSTIC      FOOT DEBRIDEMENT Right 2016    I & D    GASTROCNEMIUS RECESSION Left 2021    LEFT LEG GASTROCS RECESSION performed by Bethany Duff MD at [address] [facility] [facility] Right 2019    I&D RIGHT STUMP performed by Stephanie Fischer MD at [address] Right 2016    LEG AMPUTATION BELOW KNEE Right 2019    I&D AND REVISION OF AMPUTATION RIGHT LEG performed by Stephanie Fischer MD at [place] [address]. Right 1/14/15    sole of foot I&D    [place] DRAIN INFECT SHOULDER BURSA Left 2017    LEFT SHOULDER INCISION AND DRAINAGE performed by Stephanie Fischer MD at [address] [facility] OFFICE/OUTPT [address] [place] [address] Right 2018    EXCISIONAL DEBRIDEMENT RIGHT BKA STUMP performed by Megan Baker MD at [place] [address] Right 1/16/15    2nd toe with wound vac applied    UPPER GASTROINTESTINAL ENDOSCOPY N/A 3/3/2020    EGD DILATION SAVORY performed by Meryle Ellison, MD at [facility]  ? when       Social History:    Social History     Tobacco Use    Smoking status: Former Smoker     Packs/day: 0.00     Years: 10.00     Pack years: 0.00     Types: Cigars     Start date: 1985     Quit date:      Years since quittin.8    Smokeless tobacco: Never Used   Substance Use Topics    Alcohol use: Not Currently     Alcohol/week: 0.0 standard drinks                                Counseling given: Not Answered      Vital Signs (Current):   Vitals:    06/15/22 1047   BP: (!) 125/56   Pulse: 72   Resp: 18   Temp: 36.2 °C (97.2 °F) Component Value Date    COVID19 DETECTED 01/28/2022    COVID19 Not Detected 09/03/2020           Anesthesia Evaluation  Patient summary reviewed  Airway: Mallampati: IV          Dental:          Pulmonary: breath sounds clear to auscultation  (+) COPD:  shortness of breath:  sleep apnea:                             Cardiovascular:    (+) hypertension:, angina:, CAD:, ROCHE:,         Rhythm: regular  Rate: normal                    Neuro/Psych:   (+) neuromuscular disease:, psychiatric history:            GI/Hepatic/Renal:   (+) GERD: poorly controlled,           Endo/Other:    (+) DiabetesType II DM, no interval change, using insulin, hypothyroidism::., .                 Abdominal:   (+) obese,           Vascular: Other Findings:           Anesthesia Plan      MAC     ASA 3       Induction: intravenous. Anesthetic plan and risks discussed with patient. Plan discussed with attending.                     MARILYN Gomez - CRNA   6/15/2022

## 2022-06-15 NOTE — BRIEF OP NOTE
Brief Postoperative Note      Patient: Isabell Ferreira  YOB: 1970  MRN: [id number]    Date of Procedure: 6/15/2022    Pre-Op Diagnosis: DYSPHAGIA, MELENA, GERD, HEARTBURN, LOWER UPPER QUANDRANT PAIN, ABDOMINAL PAIN    Post-Op Diagnosis: ESOPHAGEAL RING S/P 18-20 MM BALLOON DILTION       Procedure(s):  EGD DILATION BALLOON    Surgeon(s):  Gail Green MD    Assistant:  * No surgical staff found *    Anesthesia: Monitor Anesthesia Care    Estimated Blood Loss (mL): Minimal    Complications: None    Specimens:   * No specimens in log *    Implants:  * No implants in log *      Drains: * No LDAs found *    Findings: ESOPHAGEAL RING S/P 18-20 MM BALLOON DILTION           Electronically signed by Gail Green MD on 6/15/2022 at 1:22 PM

## 2022-06-21 ENCOUNTER — TELEPHONE (OUTPATIENT)
Dept: INTERNAL MEDICINE CLINIC | Age: 52
End: 2022-06-21

## 2022-06-21 NOTE — TELEPHONE ENCOUNTER
[place] [facility] [place] needs face to face faxed to them at [phone] for his occupational health visit for electric wheelchair. They will not schedule his visit until we fax face to face.    Need Dr. Paulo Hayward documentation completed

## 2022-06-26 DIAGNOSIS — G62.9 NEUROPATHY: ICD-10-CM

## 2022-06-27 RX ORDER — GABAPENTIN 400 MG/1
CAPSULE ORAL
Qty: 90 CAPSULE | Refills: 1 | OUTPATIENT
Start: 2022-06-27 | End: 2022-09-11

## 2022-06-28 DIAGNOSIS — G62.9 NEUROPATHY: ICD-10-CM

## 2022-06-28 RX ORDER — GABAPENTIN 400 MG/1
CAPSULE ORAL
Qty: 90 CAPSULE | Refills: 1 | OUTPATIENT
Start: 2022-06-28

## 2022-07-05 RX ORDER — ATORVASTATIN CALCIUM 40 MG/1
40 TABLET, FILM COATED ORAL NIGHTLY
Qty: 30 TABLET | Refills: 3 | OUTPATIENT
Start: 2022-07-05

## 2022-07-12 ENCOUNTER — TELEPHONE (OUTPATIENT)
Dept: INTERNAL MEDICINE CLINIC | Age: 52
End: 2022-07-12

## 2022-07-12 NOTE — TELEPHONE ENCOUNTER
----- Message from Zachary Rocha MD sent at 7/9/2022  3:16 PM EDT -----  Please print order in chart for microalbumin and order TSH with reflux FT4 for patient to due prior to visit with Dr. Jud Simmons this week.   Note done for OT referral.

## 2022-07-12 NOTE — TELEPHONE ENCOUNTER
Called patient and reminded him to get his labs done that were ordered at his 6/7/22 appointment in resident clinic . Patient verbalized understanding and will get labs done at [address] [place] [address].

## 2022-07-14 ENCOUNTER — HOSPITAL ENCOUNTER (OUTPATIENT)
Age: 52
Setting detail: OBSERVATION
Discharge: HOME | End: 2022-07-15
Attending: EMERGENCY MEDICINE
Payer: MEDICARE

## 2022-07-14 ENCOUNTER — APPOINTMENT (OUTPATIENT)
Dept: GENERAL RADIOLOGY | Age: 52
End: 2022-07-14
Payer: MEDICARE

## 2022-07-14 ENCOUNTER — APPOINTMENT (OUTPATIENT)
Dept: CT IMAGING | Age: 52
End: 2022-07-14
Payer: MEDICARE

## 2022-07-14 ENCOUNTER — OFFICE VISIT (OUTPATIENT)
Dept: INTERNAL MEDICINE CLINIC | Age: 52
End: 2022-07-14
Payer: MEDICARE

## 2022-07-14 VITALS
DIASTOLIC BLOOD PRESSURE: 84 MMHG | TEMPERATURE: 97.6 F | BODY MASS INDEX: 36.36 KG/M2 | SYSTOLIC BLOOD PRESSURE: 122 MMHG | WEIGHT: 225.3 LBS | HEART RATE: 98 BPM

## 2022-07-14 DIAGNOSIS — R10.30 LOWER ABDOMINAL PAIN: Primary | ICD-10-CM

## 2022-07-14 DIAGNOSIS — J44.1 COPD EXACERBATION (HCC): ICD-10-CM

## 2022-07-14 DIAGNOSIS — Z89.611 HISTORY OF ABOVE-KNEE AMPUTATION OF RIGHT LOWER EXTREMITY (HCC): ICD-10-CM

## 2022-07-14 DIAGNOSIS — E11.65 TYPE 2 DIABETES MELLITUS WITH HYPERGLYCEMIA, WITH LONG-TERM CURRENT USE OF INSULIN (HCC): Primary | ICD-10-CM

## 2022-07-14 DIAGNOSIS — E03.9 HYPOTHYROIDISM, UNSPECIFIED TYPE: ICD-10-CM

## 2022-07-14 DIAGNOSIS — A41.9 SEPTICEMIA (HCC): ICD-10-CM

## 2022-07-14 DIAGNOSIS — Z79.4 TYPE 2 DIABETES MELLITUS WITH HYPERGLYCEMIA, WITH LONG-TERM CURRENT USE OF INSULIN (HCC): Primary | ICD-10-CM

## 2022-07-14 DIAGNOSIS — G62.9 NEUROPATHY: ICD-10-CM

## 2022-07-14 DIAGNOSIS — F19.10 OTHER PSYCHOACTIVE SUBSTANCE ABUSE, UNCOMPLICATED (HCC): ICD-10-CM

## 2022-07-14 DIAGNOSIS — R19.7 DIARRHEA, UNSPECIFIED TYPE: ICD-10-CM

## 2022-07-14 DIAGNOSIS — F31.9 BIPOLAR 1 DISORDER (HCC): ICD-10-CM

## 2022-07-14 LAB
ALBUMIN SERPL-MCNC: 4.7 G/DL (ref 3.5–5.1)
ALP BLD-CCNC: 92 U/L (ref 38–126)
ALT SERPL-CCNC: 24 U/L (ref 11–66)
ANION GAP SERPL CALCULATED.3IONS-SCNC: 14 MEQ/L (ref 8–16)
APTT: 32.5 SECONDS (ref 22–38)
AST SERPL-CCNC: 28 U/L (ref 5–40)
BACTERIA: ABNORMAL /HPF
BASOPHILS # BLD: 0.6 %
BASOPHILS ABSOLUTE: 0.1 THOU/MM3 (ref 0–0.1)
BETA-HYDROXYBUTYRATE: 2.2 MG/DL (ref 0.2–2.81)
BILIRUB SERPL-MCNC: 0.6 MG/DL (ref 0.3–1.2)
BILIRUBIN DIRECT: < 0.2 MG/DL (ref 0–0.3)
BILIRUBIN URINE: NEGATIVE
BLOOD, URINE: NEGATIVE
BUN BLDV-MCNC: 16 MG/DL (ref 7–22)
C-REACTIVE PROTEIN: 1.27 MG/DL (ref 0–1)
CALCIUM SERPL-MCNC: 9.8 MG/DL (ref 8.5–10.5)
CASTS 2: ABNORMAL /LPF
CASTS UA: ABNORMAL /LPF
CHARACTER, URINE: CLEAR
CHLORIDE BLD-SCNC: 98 MEQ/L (ref 98–111)
CHP ED QC CHECK: NORMAL
CO2: 24 MEQ/L (ref 23–33)
COLOR: YELLOW
CREAT SERPL-MCNC: 0.9 MG/DL (ref 0.4–1.2)
CRYSTALS, UA: ABNORMAL
EOSINOPHIL # BLD: 3.4 %
EOSINOPHILS ABSOLUTE: 0.5 THOU/MM3 (ref 0–0.4)
EPITHELIAL CELLS, UA: ABNORMAL /HPF
ERYTHROCYTE [DISTWIDTH] IN BLOOD BY AUTOMATED COUNT: 14.3 % (ref 11.5–14.5)
ERYTHROCYTE [DISTWIDTH] IN BLOOD BY AUTOMATED COUNT: 45.7 FL (ref 35–45)
GFR SERPL CREATININE-BSD FRML MDRD: 88 ML/MIN/1.73M2
GLUCOSE BLD-MCNC: 139 MG/DL (ref 70–108)
GLUCOSE BLD-MCNC: 149 MG/DL (ref 70–108)
GLUCOSE BLD-MCNC: 155 MG/DL
GLUCOSE BLD-MCNC: 155 MG/DL (ref 70–108)
GLUCOSE URINE: 250 MG/DL
HCT VFR BLD CALC: 46 % (ref 42–52)
HEMOCCULT STL QL: NEGATIVE
HEMOGLOBIN: 16.4 GM/DL (ref 14–18)
IMMATURE GRANS (ABS): 0.1 THOU/MM3 (ref 0–0.07)
IMMATURE GRANULOCYTES: 0.7 %
INR BLD: 1.05 (ref 0.85–1.13)
KETONES, URINE: NEGATIVE
LACTIC ACID, SEPSIS: 2.1 MMOL/L (ref 0.5–1.9)
LACTIC ACID, SEPSIS: 2.6 MMOL/L (ref 0.5–1.9)
LEUKOCYTE ESTERASE, URINE: NEGATIVE
LIPASE: 41 U/L (ref 5.6–51.3)
LYMPHOCYTES # BLD: 21.3 %
LYMPHOCYTES ABSOLUTE: 3 THOU/MM3 (ref 1–4.8)
MCH RBC QN AUTO: 31.4 PG (ref 26–33)
MCHC RBC AUTO-ENTMCNC: 35.7 GM/DL (ref 32.2–35.5)
MCV RBC AUTO: 88.1 FL (ref 80–94)
MISCELLANEOUS 2: ABNORMAL
MONOCYTES # BLD: 6.4 %
MONOCYTES ABSOLUTE: 0.9 THOU/MM3 (ref 0.4–1.3)
NITRITE, URINE: NEGATIVE
NUCLEATED RED BLOOD CELLS: 0 /100 WBC
OSMOLALITY CALCULATION: 275.4 MOSMOL/KG (ref 275–300)
PH UA: 5 (ref 5–9)
PLATELET # BLD: 251 THOU/MM3 (ref 130–400)
PMV BLD AUTO: 9.5 FL (ref 9.4–12.4)
POTASSIUM SERPL-SCNC: 3.8 MEQ/L (ref 3.5–5.2)
PROTEIN UA: ABNORMAL
RBC # BLD: 5.22 MILL/MM3 (ref 4.7–6.1)
RBC URINE: ABNORMAL /HPF
RENAL EPITHELIAL, UA: ABNORMAL
SEG NEUTROPHILS: 67.6 %
SEGMENTED NEUTROPHILS ABSOLUTE COUNT: 9.5 THOU/MM3 (ref 1.8–7.7)
SODIUM BLD-SCNC: 136 MEQ/L (ref 135–145)
SPECIFIC GRAVITY, URINE: > 1.03 (ref 1–1.03)
T4 FREE: 1.46 NG/DL (ref 0.93–1.76)
TOTAL PROTEIN: 8.6 G/DL (ref 6.1–8)
TSH SERPL DL<=0.05 MIU/L-ACNC: 3.08 UIU/ML (ref 0.4–4.2)
UROBILINOGEN, URINE: 0.2 EU/DL (ref 0–1)
WBC # BLD: 14 THOU/MM3 (ref 4.8–10.8)
WBC UA: ABNORMAL /HPF
YEAST: ABNORMAL

## 2022-07-14 PROCEDURE — 80053 COMPREHEN METABOLIC PANEL: CPT

## 2022-07-14 PROCEDURE — 2022F DILAT RTA XM EVC RTNOPTHY: CPT | Performed by: INTERNAL MEDICINE

## 2022-07-14 PROCEDURE — 85610 PROTHROMBIN TIME: CPT

## 2022-07-14 PROCEDURE — 96367 TX/PROPH/DG ADDL SEQ IV INF: CPT

## 2022-07-14 PROCEDURE — 96365 THER/PROPH/DIAG IV INF INIT: CPT

## 2022-07-14 PROCEDURE — 85025 COMPLETE CBC W/AUTO DIFF WBC: CPT

## 2022-07-14 PROCEDURE — G0378 HOSPITAL OBSERVATION PER HR: HCPCS

## 2022-07-14 PROCEDURE — 85730 THROMBOPLASTIN TIME PARTIAL: CPT

## 2022-07-14 PROCEDURE — 96366 THER/PROPH/DIAG IV INF ADDON: CPT

## 2022-07-14 PROCEDURE — 99214 OFFICE O/P EST MOD 30 MIN: CPT | Performed by: INTERNAL MEDICINE

## 2022-07-14 PROCEDURE — 2580000003 HC RX 258: Performed by: STUDENT IN AN ORGANIZED HEALTH CARE EDUCATION/TRAINING PROGRAM

## 2022-07-14 PROCEDURE — 84439 ASSAY OF FREE THYROXINE: CPT

## 2022-07-14 PROCEDURE — 86140 C-REACTIVE PROTEIN: CPT

## 2022-07-14 PROCEDURE — 3052F HG A1C>EQUAL 8.0%<EQUAL 9.0%: CPT | Performed by: INTERNAL MEDICINE

## 2022-07-14 PROCEDURE — 96361 HYDRATE IV INFUSION ADD-ON: CPT

## 2022-07-14 PROCEDURE — 3023F SPIROM DOC REV: CPT | Performed by: INTERNAL MEDICINE

## 2022-07-14 PROCEDURE — 82248 BILIRUBIN DIRECT: CPT

## 2022-07-14 PROCEDURE — 74177 CT ABD & PELVIS W/CONTRAST: CPT

## 2022-07-14 PROCEDURE — G8427 DOCREV CUR MEDS BY ELIG CLIN: HCPCS | Performed by: INTERNAL MEDICINE

## 2022-07-14 PROCEDURE — 83690 ASSAY OF LIPASE: CPT

## 2022-07-14 PROCEDURE — 1036F TOBACCO NON-USER: CPT | Performed by: INTERNAL MEDICINE

## 2022-07-14 PROCEDURE — 6360000004 HC RX CONTRAST MEDICATION: Performed by: STUDENT IN AN ORGANIZED HEALTH CARE EDUCATION/TRAINING PROGRAM

## 2022-07-14 PROCEDURE — 36415 COLL VENOUS BLD VENIPUNCTURE: CPT

## 2022-07-14 PROCEDURE — 6360000002 HC RX W HCPCS: Performed by: STUDENT IN AN ORGANIZED HEALTH CARE EDUCATION/TRAINING PROGRAM

## 2022-07-14 PROCEDURE — 87040 BLOOD CULTURE FOR BACTERIA: CPT

## 2022-07-14 PROCEDURE — 99219 PR INITIAL OBSERVATION CARE/DAY 50 MINUTES: CPT | Performed by: INTERNAL MEDICINE

## 2022-07-14 PROCEDURE — 2500000003 HC RX 250 WO HCPCS: Performed by: STUDENT IN AN ORGANIZED HEALTH CARE EDUCATION/TRAINING PROGRAM

## 2022-07-14 PROCEDURE — 82948 REAGENT STRIP/BLOOD GLUCOSE: CPT

## 2022-07-14 PROCEDURE — 84443 ASSAY THYROID STIM HORMONE: CPT

## 2022-07-14 PROCEDURE — 83605 ASSAY OF LACTIC ACID: CPT

## 2022-07-14 PROCEDURE — 82272 OCCULT BLD FECES 1-3 TESTS: CPT

## 2022-07-14 PROCEDURE — G8417 CALC BMI ABV UP PARAM F/U: HCPCS | Performed by: INTERNAL MEDICINE

## 2022-07-14 PROCEDURE — 3017F COLORECTAL CA SCREEN DOC REV: CPT | Performed by: INTERNAL MEDICINE

## 2022-07-14 PROCEDURE — 71045 X-RAY EXAM CHEST 1 VIEW: CPT

## 2022-07-14 PROCEDURE — 81001 URINALYSIS AUTO W/SCOPE: CPT

## 2022-07-14 PROCEDURE — 6370000000 HC RX 637 (ALT 250 FOR IP): Performed by: STUDENT IN AN ORGANIZED HEALTH CARE EDUCATION/TRAINING PROGRAM

## 2022-07-14 PROCEDURE — 82010 KETONE BODYS QUAN: CPT

## 2022-07-14 PROCEDURE — 99285 EMERGENCY DEPT VISIT HI MDM: CPT

## 2022-07-14 RX ORDER — POLYETHYLENE GLYCOL 3350 17 G/17G
17 POWDER, FOR SOLUTION ORAL DAILY PRN
Status: DISCONTINUED | OUTPATIENT
Start: 2022-07-14 | End: 2022-07-15 | Stop reason: HOSPADM

## 2022-07-14 RX ORDER — INSULIN LISPRO 100 [IU]/ML
0-9 INJECTION, SOLUTION INTRAVENOUS; SUBCUTANEOUS NIGHTLY
Status: DISCONTINUED | OUTPATIENT
Start: 2022-07-14 | End: 2022-07-15 | Stop reason: HOSPADM

## 2022-07-14 RX ORDER — ENOXAPARIN SODIUM 100 MG/ML
40 INJECTION SUBCUTANEOUS NIGHTLY
Status: DISCONTINUED | OUTPATIENT
Start: 2022-07-14 | End: 2022-07-15 | Stop reason: HOSPADM

## 2022-07-14 RX ORDER — ONDANSETRON 4 MG/1
4 TABLET, ORALLY DISINTEGRATING ORAL EVERY 8 HOURS PRN
Status: DISCONTINUED | OUTPATIENT
Start: 2022-07-14 | End: 2022-07-15 | Stop reason: HOSPADM

## 2022-07-14 RX ORDER — DEXTROSE MONOHYDRATE 50 MG/ML
100 INJECTION, SOLUTION INTRAVENOUS PRN
Status: DISCONTINUED | OUTPATIENT
Start: 2022-07-14 | End: 2022-07-15 | Stop reason: HOSPADM

## 2022-07-14 RX ORDER — GABAPENTIN 400 MG/1
400 CAPSULE ORAL 3 TIMES DAILY
Status: DISCONTINUED | OUTPATIENT
Start: 2022-07-14 | End: 2022-07-15 | Stop reason: HOSPADM

## 2022-07-14 RX ORDER — SODIUM CHLORIDE 9 MG/ML
INJECTION, SOLUTION INTRAVENOUS PRN
Status: DISCONTINUED | OUTPATIENT
Start: 2022-07-14 | End: 2022-07-15 | Stop reason: HOSPADM

## 2022-07-14 RX ORDER — SODIUM CHLORIDE 0.9 % (FLUSH) 0.9 %
5-40 SYRINGE (ML) INJECTION EVERY 12 HOURS SCHEDULED
Status: DISCONTINUED | OUTPATIENT
Start: 2022-07-14 | End: 2022-07-15 | Stop reason: HOSPADM

## 2022-07-14 RX ORDER — LEVOTHYROXINE SODIUM 0.05 MG/1
50 TABLET ORAL DAILY
Qty: 90 TABLET | Refills: 1 | Status: SHIPPED | OUTPATIENT
Start: 2022-07-14 | End: 2022-10-03

## 2022-07-14 RX ORDER — LISINOPRIL 10 MG/1
10 TABLET ORAL DAILY
Status: DISCONTINUED | OUTPATIENT
Start: 2022-07-15 | End: 2022-07-15 | Stop reason: HOSPADM

## 2022-07-14 RX ORDER — METRONIDAZOLE 500 MG/100ML
500 INJECTION, SOLUTION INTRAVENOUS ONCE
Status: COMPLETED | OUTPATIENT
Start: 2022-07-14 | End: 2022-07-14

## 2022-07-14 RX ORDER — ATORVASTATIN CALCIUM 40 MG/1
40 TABLET, FILM COATED ORAL NIGHTLY
Status: DISCONTINUED | OUTPATIENT
Start: 2022-07-14 | End: 2022-07-15 | Stop reason: HOSPADM

## 2022-07-14 RX ORDER — METOPROLOL TARTRATE 50 MG/1
50 TABLET, FILM COATED ORAL 2 TIMES DAILY
Status: DISCONTINUED | OUTPATIENT
Start: 2022-07-14 | End: 2022-07-15 | Stop reason: HOSPADM

## 2022-07-14 RX ORDER — GABAPENTIN 400 MG/1
CAPSULE ORAL
Qty: 90 CAPSULE | Refills: 1 | Status: SHIPPED | OUTPATIENT
Start: 2022-07-14 | End: 2022-09-29

## 2022-07-14 RX ORDER — SODIUM CHLORIDE 0.9 % (FLUSH) 0.9 %
10 SYRINGE (ML) INJECTION PRN
Status: DISCONTINUED | OUTPATIENT
Start: 2022-07-14 | End: 2022-07-15 | Stop reason: HOSPADM

## 2022-07-14 RX ORDER — 0.9 % SODIUM CHLORIDE 0.9 %
1000 INTRAVENOUS SOLUTION INTRAVENOUS ONCE
Status: COMPLETED | OUTPATIENT
Start: 2022-07-14 | End: 2022-07-14

## 2022-07-14 RX ORDER — ACETAMINOPHEN 650 MG/1
650 SUPPOSITORY RECTAL EVERY 6 HOURS PRN
Status: DISCONTINUED | OUTPATIENT
Start: 2022-07-14 | End: 2022-07-15 | Stop reason: HOSPADM

## 2022-07-14 RX ORDER — ACETAMINOPHEN 325 MG/1
650 TABLET ORAL EVERY 6 HOURS PRN
Status: DISCONTINUED | OUTPATIENT
Start: 2022-07-14 | End: 2022-07-15 | Stop reason: HOSPADM

## 2022-07-14 RX ORDER — SODIUM CHLORIDE 9 MG/ML
INJECTION, SOLUTION INTRAVENOUS CONTINUOUS
Status: DISCONTINUED | OUTPATIENT
Start: 2022-07-14 | End: 2022-07-15 | Stop reason: HOSPADM

## 2022-07-14 RX ORDER — ONDANSETRON 2 MG/ML
4 INJECTION INTRAMUSCULAR; INTRAVENOUS EVERY 6 HOURS PRN
Status: DISCONTINUED | OUTPATIENT
Start: 2022-07-14 | End: 2022-07-15 | Stop reason: HOSPADM

## 2022-07-14 RX ORDER — PANTOPRAZOLE SODIUM 40 MG/1
40 TABLET, DELAYED RELEASE ORAL
Status: DISCONTINUED | OUTPATIENT
Start: 2022-07-15 | End: 2022-07-15 | Stop reason: HOSPADM

## 2022-07-14 RX ORDER — INSULIN LISPRO 100 [IU]/ML
0-18 INJECTION, SOLUTION INTRAVENOUS; SUBCUTANEOUS
Status: DISCONTINUED | OUTPATIENT
Start: 2022-07-15 | End: 2022-07-15 | Stop reason: HOSPADM

## 2022-07-14 RX ORDER — LEVOTHYROXINE SODIUM 0.05 MG/1
50 TABLET ORAL DAILY
Status: DISCONTINUED | OUTPATIENT
Start: 2022-07-15 | End: 2022-07-15 | Stop reason: HOSPADM

## 2022-07-14 RX ORDER — ARIPIPRAZOLE 5 MG/1
5 TABLET ORAL DAILY
Status: DISCONTINUED | OUTPATIENT
Start: 2022-07-15 | End: 2022-07-15 | Stop reason: HOSPADM

## 2022-07-14 RX ORDER — SERTRALINE HYDROCHLORIDE 100 MG/1
100 TABLET, FILM COATED ORAL DAILY
Status: DISCONTINUED | OUTPATIENT
Start: 2022-07-15 | End: 2022-07-15 | Stop reason: HOSPADM

## 2022-07-14 RX ORDER — INSULIN GLARGINE 100 [IU]/ML
80 INJECTION, SOLUTION SUBCUTANEOUS 2 TIMES DAILY
Status: DISCONTINUED | OUTPATIENT
Start: 2022-07-14 | End: 2022-07-15 | Stop reason: HOSPADM

## 2022-07-14 RX ADMIN — SODIUM CHLORIDE, PRESERVATIVE FREE 10 ML: 5 INJECTION INTRAVENOUS at 21:50

## 2022-07-14 RX ADMIN — GABAPENTIN 400 MG: 400 CAPSULE ORAL at 22:34

## 2022-07-14 RX ADMIN — INSULIN LISPRO 2 UNITS: 100 INJECTION, SOLUTION INTRAVENOUS; SUBCUTANEOUS at 22:13

## 2022-07-14 RX ADMIN — CEFTRIAXONE SODIUM 1000 MG: 1 INJECTION, POWDER, FOR SOLUTION INTRAMUSCULAR; INTRAVENOUS at 16:51

## 2022-07-14 RX ADMIN — METRONIDAZOLE 500 MG: 500 INJECTION, SOLUTION INTRAVENOUS at 18:24

## 2022-07-14 RX ADMIN — ATORVASTATIN CALCIUM 40 MG: 40 TABLET, FILM COATED ORAL at 22:34

## 2022-07-14 RX ADMIN — SODIUM CHLORIDE 1000 ML: 9 INJECTION, SOLUTION INTRAVENOUS at 16:11

## 2022-07-14 RX ADMIN — IOPAMIDOL 80 ML: 755 INJECTION, SOLUTION INTRAVENOUS at 15:30

## 2022-07-14 RX ADMIN — METOPROLOL TARTRATE 50 MG: 50 TABLET, FILM COATED ORAL at 22:34

## 2022-07-14 RX ADMIN — SODIUM CHLORIDE: 9 INJECTION, SOLUTION INTRAVENOUS at 21:50

## 2022-07-14 SDOH — ECONOMIC STABILITY: FOOD INSECURITY: WITHIN THE PAST 12 MONTHS, YOU WORRIED THAT YOUR FOOD WOULD RUN OUT BEFORE YOU GOT MONEY TO BUY MORE.: NEVER TRUE

## 2022-07-14 SDOH — ECONOMIC STABILITY: FOOD INSECURITY: WITHIN THE PAST 12 MONTHS, THE FOOD YOU BOUGHT JUST DIDN'T LAST AND YOU DIDN'T HAVE MONEY TO GET MORE.: NEVER TRUE

## 2022-07-14 ASSESSMENT — ENCOUNTER SYMPTOMS
RHINORRHEA: 0
SORE THROAT: 0
SINUS PAIN: 0
SHORTNESS OF BREATH: 0
NAUSEA: 1
ABDOMINAL PAIN: 1
VOMITING: 0
BACK PAIN: 0
DIARRHEA: 1
EYE REDNESS: 0
COUGH: 0

## 2022-07-14 ASSESSMENT — LIFESTYLE VARIABLES: HOW OFTEN DO YOU HAVE A DRINK CONTAINING ALCOHOL: NEVER

## 2022-07-14 ASSESSMENT — PAIN - FUNCTIONAL ASSESSMENT
PAIN_FUNCTIONAL_ASSESSMENT: NONE - DENIES PAIN
PAIN_FUNCTIONAL_ASSESSMENT: NONE - DENIES PAIN

## 2022-07-14 ASSESSMENT — SOCIAL DETERMINANTS OF HEALTH (SDOH): HOW HARD IS IT FOR YOU TO PAY FOR THE VERY BASICS LIKE FOOD, HOUSING, MEDICAL CARE, AND HEATING?: NOT HARD AT ALL

## 2022-07-14 NOTE — H&P
Hospitalist - History & Physical    Patient: Olayinka Mcgarry    Unit/Bed:36/036A  YOB: 1970  MRN: [id number]   Acct: [de-identified]   PCP: Mekhi Sims MD    Date of Service: Pt seen/examined on 07/14/22 and admitted to Observation with expected LOS less than two midnights due to medical therapy. Chief Complaint:  Diarrhea, Nausea, Vomiting     Assessment and Plan:-  1. Diarrhea: Onset many months ago. Per chart review, chronic issue for him. There was some concern for C. Diff by PCP since he recently just completed abx course for LLE. CT scan in ED unremarkable for any colitis. Will order stool panel to rule out C. Diff but this is all likely lactose intolerance/IBS. Has been sent previously to ED for similar issues with unremarkable workup. Patient states diarrhea, cramping only occurs after meals so roughly 2-3x a day. 2. Nausea and Vomiting: Patient reports intermittent abdominal cramping and some episodes of emesis within the last few weeks. Etiology possibly delayed gastric emptying from diabetic gastroparesis. Currently ruling out infectious etiologies. Workup pending. Will start IV  cc/hr   3. Lactic Acidosis: Etiologies include infectious diarrhea vs. Metformin. Lactic acid buildup also very common in lactose intolerant patients as the undigested lactose ferments in intestine. Trending down. Continue fluids. 4. IDDM2: Hgb A1c 8.2 as of 04/2022, on  jardiance, metformin, 85u Lantus BID, with Dexcom averaging ~474D, complicated with severe diabetic neuropathy and LLE ulcer wounds. Will hold home januvia and metformin. Continue lantus BID. Will add supplemental SSI. Hypoglycemia protocol. 5. Diabetic Neuropathy: Absent sensation in LLE. Some tingling and numbness in RUE. Continue home gabapentin. 6. Diabetic Wounds: In LLE, clean, no purulence or drainage noted. Recently completed a-10 day abx course on 07/06. Continue tight blood sugar control.    7. Hypothyroidism: Will continue home Synthroid. Will recheck TSH/T4 as it has been over a year since last check and overcorrection can also explain his diarrhea, tachycardia. 8. COPD: continue home inhalers   9. Hx Left AKA: Noted   10. Obesity: BMI 36 noted    History Of Present Illness:    Parish Patino is a 44 yo M with a past medical hx of IDDM2 A1c 8.2 on jardiance, metformin, 85u Lantus BID, with Dexcom averaging ~014U, complicated with severe diabetic neuropathy and LLE ulcer wounds for which he recently completed a course of antibiotics for last week. Additional Hx includes COPD, right AKA, bipolar/depression on Abilify and Zoloft. He was sent to [facility] ED on 07/14/22 directly from PCP Dr. Jud Simmons for further evaluation of his n/v/d. Patient reports he's been having n/v/d for a little over a month now and does not understand why. Says stools are always runny, nonbloody, and dark in color. Per chart review, he's had this same exact presentation in the past and GIB was ruled out each time. Hgb remains stable >15. Patient reports his Gerarda Nailer" is more like abdominal cramping and takes place after meals. His bouts of emesis have been intermittent in the past few months. There was some concern for possible C. Diff. In the ED, patient presented with , RR 17, /85 mmHg, O2 Sat 95% on room air. Lab workup revealed lactic acidosis with levels 2.6, WBC 14 on CBC. Otherwise unremarkable workup. Stool was not able to be collected for panel. CT abdomen was also unremarkable with no signs of infection/colitis. Blood cultures were obtained and patient was subsequently admitted for obs/ C. Diff rule out.      Past Medical History:        Diagnosis Date    Atherosclerotic heart disease of native coronary artery with unspecified angina pectoris 1/18/2022    Bipolar 2 disorder [facility])     previously followed with Dr. Chrissie Burk and Sean Martinez in [facility] BPH (benign prostatic hyperplasia)     COPD (chronic obstructive Meghan Phan MD at [address] [facility] Right 07/20/2016    LEG AMPUTATION BELOW KNEE Right 4/4/2019    I&D AND REVISION OF AMPUTATION RIGHT LEG performed by Meghan Phan MD at [address] [facility] Right 1/14/15    sole of foot I&D    NV DRAIN INFECT SHOULDER BURSA Left 8/18/2017    LEFT SHOULDER INCISION AND DRAINAGE performed by Meghan Phan MD at [address] [facility] OFFICE/OUTPT [facility] Right 9/20/2018    EXCISIONAL DEBRIDEMENT RIGHT BKA STUMP performed by Vitaly Harding MD at [address] Right 1/16/15    2nd toe with wound vac applied    UPPER GASTROINTESTINAL ENDOSCOPY N/A 3/3/2020    EGD DILATION SAVORY performed by Shanon Layton MD at [facility] N/A 6/15/2022    EGD DILATION BALLOON performed by Amador Cuevas MD at [address] [facility]  ? when     Home Medications:   No current facility-administered medications on file prior to encounter. Current Outpatient Medications on File Prior to Encounter   Medication Sig Dispense Refill    insulin lispro (HUMALOG KWIKPEN U-200) 200 UNIT/ML SOPN pen 40 units Plus scale before meals; only takes if BG >200 - Max dose 160 units per day 30 pen 2    levothyroxine (SYNTHROID) 50 MCG tablet Take 1 tablet by mouth daily 90 tablet 1    empagliflozin (JARDIANCE) 25 MG tablet TAKE 1 TABLET BY MOUTH ONCE DAILY.  30 tablet 5    gabapentin (NEURONTIN) 400 MG capsule Change to 400 mg TID 90 capsule 1    Insulin Glargine, 2 Unit Dial, 300 UNIT/ML SOPN Change to 85 U in am , 85 U PM 15 pen 1    Dulaglutide (TRULICITY) 4.5 OB/0.7YJ SOPN Inject 4.5 mg into the skin once a week *dose increase 4 pen 3    omeprazole (PRILOSEC) 40 MG delayed release capsule Take 40 mg by mouth daily       famotidine (PEPCID) 40 MG tablet Take 40 mg by mouth nightly       lisinopril (PRINIVIL;ZESTRIL) 10 MG tablet Take 1 tablet by mouth daily 90 tablet 1    nitroGLYCERIN (NITROSTAT) 0.3 MG SL tablet Place 1 tablet under the tongue every 5 minutes as needed for Chest pain up to max of 3 total doses. If no relief after 1 dose, call 911. 30 tablet 3    atorvastatin (LIPITOR) 40 MG tablet Take 1 tablet by mouth nightly 30 tablet 3    metoprolol tartrate (LOPRESSOR) 50 MG tablet Take 1 tablet by mouth 2 times daily 90 tablet 3    Insulin Pen Needle (TRUEPLUS PEN NEEDLES) 31G X 8 MM MISC USE AS DIRECTED 200 each 1    sertraline (ZOLOFT) 100 MG tablet Take 100 mg by mouth daily       lisinopril (PRINIVIL;ZESTRIL) 5 MG tablet Take 1 tablet by mouth daily 90 tablet 1    Continuous Blood Gluc Transmit (DEXCOM G6 TRANSMITTER) MISC 1 Device by Does not apply route every 3 months 1 each 3    Continuous Blood Gluc Sensor (DEXCOM G6 SENSOR) MISC 1 Device by Does not apply route every 14 days 9 each 3    Continuous Blood Gluc  (DEXCOM G6 ) LIANNE 1 Device by Does not apply route 4 times daily (before meals and nightly) 1 Device 0    ARIPiprazole (ABILIFY) 5 MG tablet Take 5 mg by mouth daily          Allergies:    Pcn [penicillins], Actos [pioglitazone], Clindamycin/lincomycin, Vancomycin, and Metformin and related    Social History:    reports that he quit smoking about 21 years ago. His smoking use included cigars. He started smoking about 37 years ago. He smoked 0.00 packs per day for 10.00 years. He has never used smokeless tobacco. He reports previous alcohol use. He reports that he does not use drugs. Family History:       Problem Relation Age of Onset    Diabetes Mother     Other Mother         pneumonia, H1N1    Depression Mother     Early Death Mother     High Blood Pressure Mother     High Cholesterol Mother     Vision Loss Maternal Grandmother     Arthritis Maternal Grandfather     Heart Disease Maternal Grandfather      Diet:  ADULT DIET; Regular; 3 carb choices (45 gm/meal);  Low Fat/Low Chol/High Fiber/RAMO; Low Sodium (2 gm)    Review of systems:   Pertinent positives as noted in the HPI. All other systems reviewed and negative. PHYSICAL EXAM:  BP (!) 153/85   Pulse 96   Temp 98 °F (36.7 °C) (Oral)   Resp 18   SpO2 98%   General appearance - alert, well appearing, and in no distress and overweight, in a wheel chair   Mental status - alert and oriented         Neck - supple, no significant adenopathy  Chest - clear to auscultation, no wheezes, rales or rhonchi, symmetric air entry  Heart - normal rate, regular rhythm, normal S1, S2, no murmurs, rubs, clicks or gallops  Abdomen - soft, nontender, nondistended, no masses or organomegaly  no abdominal bruits. Obese Protuberant: No  Neurological - alert, oriented, normal speech, no focal findings or movement disorder noted, motor and sensory grossly normal bilaterally  Musculoskeletal - no joint tenderness, deformity or swelling  RLE AKA   Extremities - peripheral pulses normal, no pedal edema, no clubbing or cyanosis, Eugene's sign negative, RLE  AKA   Prosthesis: No  Skin - normal coloration and turgor,  LLE had an ulcer, completed antibiotics. Labs:   Recent Labs     07/14/22  1430   WBC 14.0*   HGB 16.4   HCT 46.0        Recent Labs     07/14/22  1430      K 3.8   CL 98   CO2 24   BUN 16   CREATININE 0.9   CALCIUM 9.8     Recent Labs     07/14/22  1430   AST 28   ALT 24   BILIDIR <0.2   BILITOT 0.6   ALKPHOS 92     Recent Labs     07/14/22  1617   INR 1.05     No results for input(s): Kimberly Lo in the last 72 hours. Urinalysis:    Lab Results   Component Value Date/Time    NITRU NEGATIVE 12/30/2021 01:25 AM    WBCUA 0-2 12/30/2021 01:25 AM    BACTERIA NONE SEEN 12/30/2021 01:25 AM    RBCUA 0-2 12/30/2021 01:25 AM    BLOODU NEGATIVE 12/30/2021 01:25 AM    SPECGRAV 1.025 07/27/2021 10:00 PM    GLUCOSEU >= 1000 12/30/2021 01:25 AM       Radiology:   XR CHEST PORTABLE   Final Result   Stable radiographic appearance of the chest. No evidence of an acute process. **This report has been created using voice recognition software. It may contain minor errors which are inherent in voice recognition technology. **      Final report electronically signed by Dr. Holly Alves MD on 7/14/2022 5:00 PM      CT ABDOMEN PELVIS W IV CONTRAST Additional Contrast? None   Final Result   1. The urinary bladder wall appears thickened which may be artifactual related to underdistention. Correlate with urinalysis. No renal calculus or obstructive uropathy is observed. 2. No bowel obstruction or pericolonic inflammation. Hepatic steatosis is unchanged. Stable chronic findings are noted. **This report has been created using voice recognition software. It may contain minor errors which are inherent in voice recognition technology. **      Final report electronically signed by Dr Ham Coffey on 7/14/2022 3:51 PM        CT ABDOMEN PELVIS W IV CONTRAST Additional Contrast? None    Result Date: 7/14/2022  PROCEDURE: CT ABDOMEN PELVIS W IV CONTRAST CLINICAL INFORMATION: Lower abdominal pain. COMPARISON: CT abdomen and pelvis 5/17/2022. TECHNIQUE: Axial 5 mm CT images were obtained through the abdomen and pelvis after the administration of 80  cc Isovue 370 intravenous contrast. Coronal and sagittal reconstructions were obtained. All CT scans at this facility use dose modulation, iterative reconstruction, and/or weight-based dosing when appropriate to reduce radiation dose to as low as reasonably achievable. FINDINGS: Lung bases: Dependent atelectasis is present. Liver/gallbladder/bilary tree: Hepatic steatosis is unchanged. No liver lesions are observed. The gallbladder is surgically absent. No biliary ductal dilatation is identified. Pancreas: Normal. Spleen : Normal. Adrenal glands: Normal. Kidneys/ ureters/ bladder: No renal calculus, hydronephrosis, or hydroureter is present. The urinary bladder wall appears thickened in part related to underdistention. Gastrointestinal:  No dilated small or large bowel loops are visualized. No pericolonic fat stranding or inflammation is observed. No bowel obstruction, free fluid, fluid collection, or free air is visualized. The appendix is normal. Retroperitoneum / lymph nodes: The aorta is not dilated. No lymphadenopathy is present. Pelvis: Radiopaque clips within the prostate gland are stable. The prostate gland is mildly enlarged measuring 4.6 x 4.5 cm, unchanged. Musculoskeletal: The visualized skeletal structures appear intact. Mild curvature of the lumbar spine with left convexity and multilevel degenerative disc disease appears stable. 1. The urinary bladder wall appears thickened which may be artifactual related to underdistention. Correlate with urinalysis. No renal calculus or obstructive uropathy is observed. 2. No bowel obstruction or pericolonic inflammation. Hepatic steatosis is unchanged. Stable chronic findings are noted. **This report has been created using voice recognition software. It may contain minor errors which are inherent in voice recognition technology. ** Final report electronically signed by Dr Teresa Fischer on 7/14/2022 3:51 PM    XR CHEST PORTABLE    Result Date: 7/14/2022  PROCEDURE: XR CHEST PORTABLE CLINICAL INFORMATION: cough. COMPARISON: Chest radiograph dated 1/25/2022. TECHNIQUE: AP upright view of the chest. FINDINGS: The lungs appear clear. The pulmonary vasculature and cardiomediastinal silhouette are within normal limits. Visualized osseous structures appear grossly intact. Stable radiographic appearance of the chest. No evidence of an acute process. **This report has been created using voice recognition software. It may contain minor errors which are inherent in voice recognition technology. ** Final report electronically signed by Dr. Fernand Favre, MD on 7/14/2022 5:00 PM      EKG: normal sinus rhythm, no blocks or conduction defects, no ischemic changes    Electronically signed by Kymberly Ordoñez DO on 7/14/2022 at 6:18 PM

## 2022-07-14 NOTE — ED PROVIDER NOTES
[facility] ENCOUNTER          Pt Name: Lara Berman  MRN: [id number]  [place] 1970  Date of evaluation: 7/14/2022  Treating Resident Physician: Faviola Michaud MD  Supervising Physician: Dr Thien Manning       Chief Complaint   Patient presents with    Diarrhea     History obtained from the patient. HISTORY OF PRESENT ILLNESS    HPI  Lara Berman is a 45 y.o. male with pmhx of 2 diabetes mellitus, GERD, diabetic polyneuropathy, bipolar 2 disorder, essential hypertension, hyperlipidemia, COPD, who presents to the emergency department for evaluation of lower abdominal pain last month as well as nausea and loose watery stools over the last month this can worsen past 2 days. Patient underwent a EGD dilation balloon on 6/15/2022 by Dr. Atiya Arthur. Patient states he was seen by his primary care physician today who described her symptoms physician was concerned and sent here for further evaluation. The patient has no other acute complaints at this time. REVIEW OF SYSTEMS   Review of Systems   Constitutional: Negative for chills and fever. HENT: Negative for rhinorrhea, sinus pain and sore throat. Eyes: Negative for redness. Respiratory: Negative for cough and shortness of breath. Cardiovascular: Negative for chest pain. Gastrointestinal: Positive for abdominal pain, diarrhea and nausea. Negative for vomiting. Genitourinary: Negative for dysuria. Musculoskeletal: Negative for back pain. Skin: Negative for rash. Neurological: Negative for light-headedness and headaches. Psychiatric/Behavioral: Negative for agitation.          PAST MEDICAL AND SURGICAL HISTORY     Past Medical History:   Diagnosis Date    Atherosclerotic heart disease of native coronary artery with unspecified angina pectoris 1/18/2022    Bipolar 2 disorder (HCC)     previously followed with Dr. Sid Pride and Judith Capone in [place]    BPH (benign prostatic hyperplasia)     COPD (chronic obstructive pulmonary disease) (HCC)     Diabetes mellitus type 2, uncontrolled ([address]     HgbA1c on 4/2/2019 was 9.1.     Diabetic peripheral neuropathy (HCC)     Diabetic polyneuropathy ([address]     Diabetic ulcer of right foot associated with type 2 diabetes mellitus ([address] 12/10/2015    Essential hypertension     \"never been on b/p medication that I know of\"    GERD (gastroesophageal reflux disease)     Hammer toe of left foot     Heart murmur     denies any chest pain or palpitations    History of tobacco abuse     Hx of AKA (above knee amputation), right ([address] 04/18/2019    Dr. Leonidas Corley    Hyperlipidemia     Macular edema, diabetic, bilateral ([address] 05/04/2018    Dr. 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 ([address] 6/29/2016     Past Surgical History:   Procedure Laterality Date    ABDOMEN SURGERY      ABSCESS DRAINAGE Right     foot    AMPUTATION ABOVE KNEE Right 4/18/2019    RIGHT ABOVE KNEE AMPUTATION performed by Maurisio Monk MD at [address] [place] [address], LAPAROSCOPIC N/A 4/1/2020    ROBOTIC CHOLECYSTECTOMY performed by Felipe [facility]ague MD at [facility] N/A 7/20/2020    CYSTOSCOPY performed by Jaya Jacobson MD at [address] [place] [address] 8/21/2020    CYSTOSCOPY, UROLIFT performed by Jaya Jacobson MD at [address] [facility], COLON, [facility] Right 07/01/2016    I & D    GASTROCNEMIUS RECESSION Left 11/12/2021    LEFT LEG GASTROCS RECESSION performed by Vini Arvizu MD at [facility] Right 2/18/2019    I&D RIGHT STUMP performed by Maurisio Monk MD at [address] Right 07/20/2016    LEG AMPUTATION BELOW KNEE Right 4/4/2019    I&D AND REVISION OF AMPUTATION RIGHT LEG performed by Maurisio Monk MD at [facility] Right 1/14/15    sole of foot I&D    TX DRAIN INFECT SHOULDER BURSA Left 8/18/2017    LEFT SHOULDER INCISION AND DRAINAGE performed by Maurisio Monk MD at [facility] OFFICE/OUTPT [facility] Right 9/20/2018    EXCISIONAL DEBRIDEMENT RIGHT BKA STUMP performed by Carmita Morley MD at [facility] Right 1/16/15    2nd toe with wound vac applied    UPPER GASTROINTESTINAL ENDOSCOPY N/A 3/3/2020    EGD DILATION SAVORY performed by Yo Ruiz MD at [address] N/A 6/15/2022    EGD DILATION BALLOON performed by Earnest Hodgkin, MD at [facility]  ? when         MEDICATIONS     Current Facility-Administered Medications:     metronidazole (FLAGYL) 500 mg in 0.9% NaCl 100 mL IVPB premix, 500 mg, IntraVENous, Once, Taylor Mena MD    Current Outpatient Medications:     insulin lispro (HUMALOG KWIKPEN U-200) 200 UNIT/ML SOPN pen, 40 units Plus scale before meals; only takes if BG >200 - Max dose 160 units per day, Disp: 30 pen, Rfl: 2    levothyroxine (SYNTHROID) 50 MCG tablet, Take 1 tablet by mouth daily, Disp: 90 tablet, Rfl: 1    empagliflozin (JARDIANCE) 25 MG tablet, TAKE 1 TABLET BY MOUTH ONCE DAILY. , Disp: 30 tablet, Rfl: 5    gabapentin (NEURONTIN) 400 MG capsule, Change to 400 mg TID, Disp: 90 capsule, Rfl: 1    Insulin Glargine, 2 Unit Dial, 300 UNIT/ML SOPN, Change to 85 U in am , 85 U PM, Disp: 15 pen, Rfl: 1    Dulaglutide (TRULICITY) 4.5 ZU/7.8IZ SOPN, Inject 4.5 mg into the skin once a week *dose increase, Disp: 4 pen, Rfl: 3    omeprazole (PRILOSEC) 40 MG delayed release capsule, Take 40 mg by mouth daily , Disp: , Rfl: famotidine (PEPCID) 40 MG tablet, Take 40 mg by mouth nightly , Disp: , Rfl:     lisinopril (PRINIVIL;ZESTRIL) 10 MG tablet, Take 1 tablet by mouth daily, Disp: 90 tablet, Rfl: 1    nitroGLYCERIN (NITROSTAT) 0.3 MG SL tablet, Place 1 tablet under the tongue every 5 minutes as needed for Chest pain up to max of 3 total doses.  If no relief after 1 dose, call 911., Disp: 30 tablet, Rfl: 3    atorvastatin (LIPITOR) 40 MG tablet, Take 1 tablet by mouth nightly, Disp: 30 tablet, Rfl: 3    metoprolol tartrate (LOPRESSOR) 50 MG tablet, Take 1 tablet by mouth 2 times daily, Disp: 90 tablet, Rfl: 3    Insulin Pen Needle (TRUEPLUS PEN NEEDLES) 31G X 8 MM MISC, USE AS DIRECTED, Disp: 200 each, Rfl: 1    sertraline (ZOLOFT) 100 MG tablet, Take 100 mg by mouth daily , Disp: , Rfl:     lisinopril (PRINIVIL;ZESTRIL) 5 MG tablet, Take 1 tablet by mouth daily, Disp: 90 tablet, Rfl: 1    Continuous Blood Gluc Transmit (DEXCOM G6 TRANSMITTER) MISC, 1 Device by Does not apply route every 3 months, Disp: 1 each, Rfl: 3    Continuous Blood Gluc Sensor (DEXCOM G6 SENSOR) MISC, 1 Device by Does not apply route every 14 days, Disp: 9 each, Rfl: 3    Continuous Blood Gluc  (DEXCOM G6 ) LIANNE, 1 Device by Does not apply route 4 times daily (before meals and nightly), Disp: 1 Device, Rfl: 0    ARIPiprazole (ABILIFY) 5 MG tablet, Take 5 mg by mouth daily , Disp: , Rfl:       SOCIAL HISTORY     Social History     Social History Narrative    Not on file     Social History     Tobacco Use    Smoking status: Former Smoker     Packs/day: 0.00     Years: 10.00     Pack years: 0.00     Types: Cigars     Start date: 1985     Quit date:      Years since quittin.8    Smokeless tobacco: Never Used   Vaping Use    Vaping Use: Former    Substances: Nicotine, Flavoring   Substance Use Topics    Alcohol use: Not Currently     Alcohol/week: 0.0 standard drinks    Drug use: No     Comment: 30 YEARS AGO         ALLERGIES     Allergies Allergen Reactions    Pcn [Penicillins] Shortness Of Breath, Nausea And Vomiting and Other (See Comments)     Does not remember reactions. Has TOLERATED amoxicillin and several different cephalosporins. Actos [Pioglitazone] Swelling    Clindamycin/Lincomycin Itching    Vancomycin Hives      Rash, with erythematous plaques to abdomen, shoulders, and proximal upper extremities with pruritis, persisted with 2nd dose administered slower. Metformin And Related Swelling         FAMILY HISTORY     Family History   Problem Relation Age of Onset    Diabetes Mother     Other Mother         pneumonia, H1N1    Depression Mother     Early Death Mother     High Blood Pressure Mother     High Cholesterol Mother     Vision Loss Maternal Grandmother     Arthritis Maternal Grandfather     Heart Disease Maternal Grandfather          PREVIOUS RECORDS   Previous records reviewed: She was seen on 6/15/2022 by gastroenterology for an EGD dilation. PHYSICAL EXAM     ED Triage Vitals   BP Temp Temp src Pulse Resp SpO2 Height Weight   -- -- -- -- -- -- -- --     Initial vital signs and nursing assessment reviewed and abnormal from Tachycardia . Pulsoximetry is normal per my interpretation. Additional Vital Signs:  Vitals:    07/14/22 1612   BP:    Pulse: 96   Resp: 18   Temp:    SpO2: 98%       Physical Exam  Vitals and nursing note reviewed. Constitutional:       Appearance: He is not toxic-appearing. HENT:      Head: Normocephalic and atraumatic. Right Ear: External ear normal.      Left Ear: External ear normal.      Nose: Nose normal.      Mouth/Throat:      Mouth: Mucous membranes are moist.      Pharynx: Oropharynx is clear. Eyes:      General: No scleral icterus. Conjunctiva/sclera: Conjunctivae normal.   Cardiovascular:      Rate and Rhythm: Normal rate and regular rhythm. Pulses: Normal pulses. Heart sounds: Normal heart sounds.    Pulmonary:      Effort: Pulmonary effort is normal. No respiratory distress. Breath sounds: Normal breath sounds. Abdominal:      General: Abdomen is flat. There is no distension. Palpations: Abdomen is soft. Tenderness: There is abdominal tenderness. There is guarding. There is no rebound. Comments: Lower abdominal tenderness to palpation with voluntary guarding   Musculoskeletal:         General: Normal range of motion. Cervical back: Normal range of motion and neck supple. No rigidity. No muscular tenderness. Lymphadenopathy:      Cervical: No cervical adenopathy. Skin:     General: Skin is warm and dry. Capillary Refill: Capillary refill takes less than 2 seconds. Coloration: Skin is not jaundiced. Neurological:      General: No focal deficit present. Mental Status: He is alert and oriented to person, place, and time. Psychiatric:         Mood and Affect: Mood normal.         Behavior: Behavior normal.           MEDICAL DECISION MAKING      Sepsis Times and Checklist  Vital Signs: BP: (!) 153/85  Heart Rate: 96  Resp: 18  Temp: 98 °F (36.7 °C) SpO2: 98 %  SIRS (>2)   Temp > 38.3C or < 36C   HR > 90   RR > 20   WBC > 12 or < 4 or >10% bands  SIRS (>2) and confirmed or suspected source of infection = Sepsis    Sepsis Identified   Date: 7/14/22  Time: 1555   Sepsis Orders:   CBC: Yes   CMP: Yes   PT/PTT: No   Blood Cultures x2: Yes   Urinalysis and Urine Culture: Yes   Lactate: Yes   Broad Spectrum Antibiotics Given (within 3 hours of sepsis identification, after blood cultures): Yes              IV Crystalloid given: Yes    If lactate >2.0 MUST repeat within 6 hours    Is lactate > 4.0:  No  If lactate >4.0 OR hypotension 30ml/kg crystalloid MUST be ordered. Fluids must be completed within 3 hours of sepsis identification. Septic Shock Identified (Initial lactate >4.0 or persistent hypotension after 30ml/kg fluid bolus):      For septic shock sepsis focus physical exam must be completed AND documented (within 6 hours). Date: NA Time: NA      Sepsis focus exam completed. For persistent hypotension after 30ml/kg fluid bolus vasopressors must be started (within 6 hours)      At this time unknown etiology of patient's source concern for possible urinary tract infection versus C. difficile, patient denies symptoms admission was bladder CT scan which showed be signs of cystitis versus UTI. He is currently providing urinary sample. ED RESULTS   Laboratory results:  Labs Reviewed   CBC WITH AUTO DIFFERENTIAL - Abnormal; Notable for the following components:       Result Value    WBC 14.0 (*)     MCHC 35.7 (*)     RDW-SD 45.7 (*)     Segs Absolute 9.5 (*)     Eosinophils Absolute 0.5 (*)     Immature Grans (Abs) 0.10 (*)     All other components within normal limits   BASIC METABOLIC PANEL - Abnormal; Notable for the following components:    Glucose 139 (*)     All other components within normal limits   LACTATE, SEPSIS - Abnormal; Notable for the following components:    Lactic Acid, Sepsis 2.6 (*)     All other components within normal limits   HEPATIC FUNCTION PANEL - Abnormal; Notable for the following components: Total Protein 8.6 (*)     All other components within normal limits   GLOMERULAR FILTRATION RATE, ESTIMATED - Abnormal; Notable for the following components:    Est, Glom Filt Rate 88 (*)     All other components within normal limits   POCT GLUCOSE - Abnormal; Notable for the following components:    POC Glucose 155 (*)     All other components within normal limits   POCT GLUCOSE - Normal   CULTURE, BLOOD 1   GASTROINTESTINAL PANEL, MOLECULAR   CULTURE, BLOOD 2   LIPASE   BETA-HYDROXYBUTYRATE   BLOOD OCCULT STOOL SCREEN #1   ANION GAP   OSMOLALITY   APTT   PROTIME-INR   LACTATE, SEPSIS   URINALYSIS WITH REFLEX TO CULTURE       Radiologic studies results:  XR CHEST PORTABLE   Final Result   Stable radiographic appearance of the chest. No evidence of an acute process.                **This report has been created using voice recognition software. It may contain minor errors which are inherent in voice recognition technology. **      Final report electronically signed by Dr. Omi Carney MD on 7/14/2022 5:00 PM      CT ABDOMEN PELVIS W IV CONTRAST Additional Contrast? None   Final Result   1. The urinary bladder wall appears thickened which may be artifactual related to underdistention. Correlate with urinalysis. No renal calculus or obstructive uropathy is observed. 2. No bowel obstruction or pericolonic inflammation. Hepatic steatosis is unchanged. Stable chronic findings are noted. **This report has been created using voice recognition software. It may contain minor errors which are inherent in voice recognition technology. **      Final report electronically signed by Dr Shira Mix on 7/14/2022 3:51 PM          ED Medications administered this visit:   Medications   metronidazole (FLAGYL) 500 mg in 0.9% NaCl 100 mL IVPB premix (has no administration in time range)   0.9 % sodium chloride bolus (1,000 mLs IntraVENous New Bag 7/14/22 1611)   iopamidol (ISOVUE-370) 76 % injection 80 mL (80 mLs IntraVENous Given 7/14/22 1530)   cefTRIAXone (ROCEPHIN) 1000 mg IVPB in 50 mL D5W minibag (1,000 mg IntraVENous New Bag 7/14/22 1651)         ED COURSE     ED Course as of 07/15/22 1713   u Jul 14, 2022   1453 OCCULT BLOOD FECAL: Negative [AL]   1553 WBC(!): 14.0 [AL]   1553 GLUCOSE, FASTING,GF(!): 139 [AL]   1553 Lactic Acid, Sepsis(!): 2.6 [AL]   1553 OCCULT BLOOD FECAL: Negative [AL]   1559 CT ABDOMEN PELVIS W IV CONTRAST Additional Contrast? None  \"  IMPRESSION:  1. The urinary bladder wall appears thickened which may be artifactual related to underdistention. Correlate with urinalysis. No renal calculus or obstructive uropathy is observed. 2. No bowel obstruction or pericolonic inflammation. Hepatic steatosis is unchanged. Stable chronic findings are noted. \" [AL]   [facility], IV Flagyl and Rocephin was ordered [AL]   1709 XR CHEST PORTABLE  \"IMPRESSION:  Stable radiographic appearance of the chest. No evidence of an acute process. \" [AL]      ED Course User Index  [AL] Osmel Santiago MD     MEDICATION CHANGES     New Prescriptions    No medications on file         FINAL DISPOSITION     Final diagnoses:   Lower abdominal pain   Diarrhea, unspecified type   Septicemia ([facility]     Condition: condition: stable  Dispo: Admit to telemetry      This transcription was electronically signed. Parts of this transcriptions may have been dictated by use of voice recognition software and electronically transcribed, and parts may have been transcribed with the assistance of an ED scribe. The transcription may contain errors not detected in proofreading. Please refer to my supervising physician's documentation if my documentation differs.     Electronically Signed: Osmel Santiago MD, 07/14/22, 5:45 PM         Osmel Santiago MD  Resident  07/14/22 3028       Osmel Santiago MD  Resident  07/15/22 [id number]

## 2022-07-14 NOTE — PROGRESS NOTES
[address] [facility]  [facility] [address], [address]  Dept: [phone]  Dept Fax: [phone]    YOB: 1970    Barrett Santacruz is here for follow up of DM -2 ,   Dr. Joanna Zarate has done the surgery   Back on Nov 12, 21 , and completed the antibiotics. Overall the LLE site healed well. He gets around in a wheel chair, A1c 8.2  in the past not due for re check till 7/26.  he is off the omnipod and using the humalog and glargine insulin with good results. And he does have dex com. , no recent LOC, no fever or chills. Got his covid booster dose. .  . no recent low sugars, but the dexcom average is 215 over the last 14 days . He lives alone, claims makes his  Own food, recommend cut down the portions, and eat more veggies   And less carb intake. Seen dietician in the past. Had covid Vaccine   And had covid . Got one booster  Dose, recommend he gets the 2 nd booster. He gets around in a wheel chair. States his breathing is normal and no CP . He claims he had EGD recently by dr. Sima Grover, he is having N/V and diarrhea, states even salad , has diarrhea, stomach hurts. No fever or chills, denies any hematemesis or bloody stools. EGD resulted in dilatation of esophagus. Finished antibiotics about a week ago due to LLE ulcer. He did not get the thyroid testing as ordered prior to this visit. Patient Active Problem List   Diagnosis    Status post below knee amputation of right lower extremity (HCC)    Gait disturbance    Sebaceous cyst    Uncontrolled type 2 diabetes mellitus with hyperglycemia, with long-term current use of insulin (HCC)    Severe bipolar II disorder, recent episode major depressive, remission ([facility]    Bipolar 1 disorder (HCC)    Leukocytosis    Lactic acidosis    Hyponatremia    Normocytic anemia    Obesity (BMI 30-39. 9)    History of noncompliance with medical treatment    Essential hypertension    Tobacco dependence    Gastroesophageal reflux disease without esophagitis    History of marijuana use    Physical debility    Anemia    Electrolyte imbalance    Type 2 diabetes mellitus with hyperglycemia, with long-term current use of insulin (HCC)    Weakness    Infection of above knee amputation stump of right leg (HCC)    Above knee amputation of right lower extremity (HCC)    Sepsis (HCC)    Vitamin D deficiency    COPD exacerbation (HCC)    Influenza B    Depression, recurrent (HCC)    Major depressive disorder, recurrent (HCC)    GI bleed    Elevated lipase    Other psychoactive substance abuse, uncomplicated (HCC)    Calculus of gallbladder without cholecystitis without obstruction    Right upper quadrant abdominal pain    Pedal edema    MURALI (obstructive sleep apnea)    Shortness of breath    Hypothyroid    Anxiety and depression    Dyspnea    Sinus tachycardia    Metabolic acidosis    Edema of left lower extremity    SOB (shortness of breath) on exertion    Intermittent palpitations    History of chest pain    Dizziness, nonspecific    Hyperlipidemia LDL goal <70    Neuropathy    Venous stasis ulcer of left lower leg with edema of left lower leg (HCC)    Angina pectoris, unspecified    Atherosclerotic heart disease of native coronary artery with unspecified angina pectoris       Current Outpatient Medications   Medication Sig Dispense Refill    Insulin Glargine, 2 Unit Dial, 300 UNIT/ML SOPN Change to 85 U in am , 85 U PM 15 pen 1    Dulaglutide (TRULICITY) 4.5 IQ/0.9PW SOPN Inject 4.5 mg into the skin once a week *dose increase 4 pen 3    omeprazole (PRILOSEC) 40 MG delayed release capsule Take 40 mg by mouth daily       famotidine (PEPCID) 40 MG tablet Take 40 mg by mouth nightly       lisinopril (PRINIVIL;ZESTRIL) 10 MG tablet Take 1 tablet by mouth daily 90 tablet 1    gabapentin (NEURONTIN) 400 MG capsule Change to 400 mg TID 90 capsule 1    empagliflozin (JARDIANCE) 25 MG tablet TAKE 1 TABLET BY MOUTH ONCE DAILY. 30 tablet 5    nitroGLYCERIN (NITROSTAT) 0.3 MG SL tablet Place 1 tablet under the tongue every 5 minutes as needed for Chest pain up to max of 3 total doses. If no relief after 1 dose, call 911. 30 tablet 3    insulin lispro (HUMALOG KWIKPEN U-200) 200 UNIT/ML SOPN pen 40 units Plus scale - Max dose 160 units per day (Patient taking differently: 40 units Plus scale before meals; only takes if BG >200 - Max dose 160 units per day) 30 pen 2    atorvastatin (LIPITOR) 40 MG tablet Take 1 tablet by mouth nightly 30 tablet 3    levothyroxine (SYNTHROID) 50 MCG tablet Take 1 tablet by mouth daily 90 tablet 1    metoprolol tartrate (LOPRESSOR) 50 MG tablet Take 1 tablet by mouth 2 times daily 90 tablet 3    Insulin Pen Needle (TRUEPLUS PEN NEEDLES) 31G X 8 MM MISC USE AS DIRECTED 200 each 1    sertraline (ZOLOFT) 100 MG tablet Take 100 mg by mouth daily       lisinopril (PRINIVIL;ZESTRIL) 5 MG tablet Take 1 tablet by mouth daily 90 tablet 1    Continuous Blood Gluc Transmit (DEXCOM G6 TRANSMITTER) MISC 1 Device by Does not apply route every 3 months 1 each 3    Continuous Blood Gluc Sensor (DEXCOM G6 SENSOR) MISC 1 Device by Does not apply route every 14 days 9 each 3    Continuous Blood Gluc  (DEXCOM G6 ) LIANNE 1 Device by Does not apply route 4 times daily (before meals and nightly) 1 Device 0    ARIPiprazole (ABILIFY) 5 MG tablet Take 5 mg by mouth daily        No current facility-administered medications for this visit. Allergies   Allergen Reactions    Pcn [Penicillins] Shortness Of Breath, Nausea And Vomiting and Other (See Comments)     Does not remember reactions. Has TOLERATED amoxicillin and several different cephalosporins.      Actos [Pioglitazone] Swelling    Clindamycin/Lincomycin Itching    Vancomycin Hives      Rash, with erythematous plaques to abdomen, shoulders, and proximal upper extremities with pruritis, persisted with 2nd dose administered slower.  Metformin And Related Swelling       Review of Systems      see HPI      /84 (Site: Left Upper Arm)   Pulse 98   Temp 97.6 °F (36.4 °C)   Wt 225 lb 4.8 oz (102.2 kg)   BMI 36.36 kg/m²       Physical Exam:    General appearance - alert, well appearing, and in no distress and overweight, in a wheel chair   Mental status - alert and oriented    Neck - supple, no significant adenopathy  Chest - clear to auscultation, no wheezes, rales or rhonchi, symmetric air entry  Heart - normal rate, regular rhythm, normal S1, S2, no murmurs, rubs, clicks or gallops  Abdomen - soft, nontender, nondistended, no masses or organomegaly  no abdominal bruits. Obese Protuberant: No  Neurological - alert, oriented, normal speech, no focal findings or movement disorder noted, motor and sensory grossly normal bilaterally  Musculoskeletal - no joint tenderness, deformity or swelling  RLE AKA   Extremities - peripheral pulses normal, no pedal edema, no clubbing or cyanosis, Eugene's sign negative, RLE  AKA   Prosthesis: No  Skin - normal coloration and turgor,  LLE had an ulcer, completed antibiotics. I have reviewed recent diagnostic testing including labs     Diagnosis Orders   1. Type 2 diabetes mellitus with hyperglycemia, with long-term current use of insulin ([address]     2. Hypothyroidism, unspecified type     3. Neuropathy     4. History of above-knee amputation of right lower extremity ([address]     5. COPD exacerbation ([address]     6. Other psychoactive substance abuse, uncomplicated ([address]     7. Bipolar 1 disorder ([facility]           Plan:    No orders of the defined types were placed in this encounter.       Outpatient Encounter Medications as of 7/14/2022   Medication Sig Dispense Refill    Insulin Glargine, 2 Unit Dial, 300 UNIT/ML SOPN Change to 85 U in am , 85 U PM 15 pen 1    Dulaglutide (TRULICITY) 4.5 LR/9.1HK SOPN Inject 4.5 mg into the skin once a week *dose increase 4 pen 3    omeprazole (PRILOSEC) 40 MG delayed release capsule Take 40 mg by mouth daily       famotidine (PEPCID) 40 MG tablet Take 40 mg by mouth nightly       lisinopril (PRINIVIL;ZESTRIL) 10 MG tablet Take 1 tablet by mouth daily 90 tablet 1    gabapentin (NEURONTIN) 400 MG capsule Change to 400 mg TID 90 capsule 1    empagliflozin (JARDIANCE) 25 MG tablet TAKE 1 TABLET BY MOUTH ONCE DAILY. 30 tablet 5    nitroGLYCERIN (NITROSTAT) 0.3 MG SL tablet Place 1 tablet under the tongue every 5 minutes as needed for Chest pain up to max of 3 total doses.  If no relief after 1 dose, call 911. 30 tablet 3    insulin lispro (HUMALOG KWIKPEN U-200) 200 UNIT/ML SOPN pen 40 units Plus scale - Max dose 160 units per day (Patient taking differently: 40 units Plus scale before meals; only takes if BG >200 - Max dose 160 units per day) 30 pen 2    atorvastatin (LIPITOR) 40 MG tablet Take 1 tablet by mouth nightly 30 tablet 3    levothyroxine (SYNTHROID) 50 MCG tablet Take 1 tablet by mouth daily 90 tablet 1    metoprolol tartrate (LOPRESSOR) 50 MG tablet Take 1 tablet by mouth 2 times daily 90 tablet 3    Insulin Pen Needle (TRUEPLUS PEN NEEDLES) 31G X 8 MM MISC USE AS DIRECTED 200 each 1    sertraline (ZOLOFT) 100 MG tablet Take 100 mg by mouth daily       lisinopril (PRINIVIL;ZESTRIL) 5 MG tablet Take 1 tablet by mouth daily 90 tablet 1    Continuous Blood Gluc Transmit (DEXCOM G6 TRANSMITTER) MISC 1 Device by Does not apply route every 3 months 1 each 3    Continuous Blood Gluc Sensor (DEXCOM G6 SENSOR) MISC 1 Device by Does not apply route every 14 days 9 each 3    Continuous Blood Gluc  (DEXCOM G6 ) LIANNE 1 Device by Does not apply route 4 times daily (before meals and nightly) 1 Device 0    ARIPiprazole (ABILIFY) 5 MG tablet Take 5 mg by mouth daily       [DISCONTINUED] Calcium-Phosphorus-Vitamin D (CALCIUM/D3 ADULT GUMMIES PO) Take 2 tablets by mouth daily       No facility-administered encounter medications on file as of 7/14/2022. Controlled Substances Monitoring:       Diagnosis Orders   1. Type 2 diabetes mellitus with hyperglycemia, with long-term current use of insulin ([facility]     2. Hypothyroidism, unspecified type     3. Neuropathy     4. History of above-knee amputation of right lower extremity ([facility]     5. COPD exacerbation ([facility]     6. Other psychoactive substance abuse, uncomplicated ([facility]     7. Bipolar 1 disorder ([facility]         Will schedule follow up appointment in 3- 4 weeks     Plan:    Due to ongoing GI symptoms with his risk factors and he claims finished his antibiotics about a week will refer him to ER to rule out c diff, needs BMP, stool studies, and CBC . As sugars have increased with todays A2c of 8.2 % , increasing the regimen of insulin and trulicity as outlined below. compliance with diet and life style modification, and meds has been highly promoted. No change in insulin regimen, follow up closely with our diabetic clinic. Increase basaglar to 85 Units am, and keep 80 U PM.  Increase   The trulicity to 3 mg Q weekly, and titrate as tolerated for optimal sugars. And recommended dietary consult, and follow up with Snow Mcgowan / Wilmer Purcell in our   Diabetic clinic. Strong dietary and  life style changes and regular exercise was recommended, and to lead an active life style . Diabetic education material was provided to the patient on this visit. Pt was counseled extensively on both   Diet and exercise regimen today , along with healthy living with diabetes. HTN is stable on the current regimen on  BB and ACE no changes. Pt with diabetic neuropathy - taking 300 mg TID, as he is having  And also seeing dr. Malathi Tejada , PM/R doc for MRI of the neck per his recommendations    Once again he will going to the ER today        Signed by : Valencia Headley M.D.       [address] [facility] Internal Medicine  [address] [facility], [address] [place] [address] Dr Norma Harley, [address]    Tel  [phone]  Fax [phone]

## 2022-07-14 NOTE — ED TRIAGE NOTES
Pt to ED via wheelchair from PCP's office for further evaluation of on going diarrhea and dry open ulcer to left lower extremity. Pt rprts has been experiencing diarrhea for a few months. Has been seen by GI w/no change or improvement. States some abdominal cramping; rates 3/10 at this time. Rprts also a wound to lower extremity w/o injury. Denies pain to area. A&O x4. Breathing easy and unlabored on RA. Protocol orders initiated.

## 2022-07-14 NOTE — ED PROVIDER NOTES
[address]    Pt Name: Rowan Cosme  MRN: [id number]  [place] 1970  Date of evaluation: 9/12/20      I personally saw and examined the patient. I have reviewed and agree with the Resident findings, including all diagnostic interpretations and treatment plans as written. I was present for the key portion of any procedures performed and the inclusive time noted in any critical care statement. History:     Patient was seen with Scarlett Medeiros, resident physician. 42-year-old male who was sent in from Dr. Jud Simmons. He has had escalating abdominal pain illness over the last 3 weeks with persistent diarrhea, concern for possible C. difficile infection. He has some prior GI history and had an EGD about a month ago. Patient is noted to have a white count of 14.0 and an elevated lactic although his stool is negative for blood. We sent off a stool specimen for stool culture  CT of the abdomen pelvis without any acute surgical issues.     Admission arranged                  Mary Cisneros DO  07/14/22 [place] [place]

## 2022-07-14 NOTE — ED NOTES
Pt updated on new orders. IV fluids initiated. Phlebotomy at bedside to obtain new labs. Pt tolerating well.       Rick Espitia RN  07/14/22 1215

## 2022-07-14 NOTE — ED NOTES
Pt updated on POC. Req to speak w/provider. Antibiotics initiated. Pt tolerating well. Denies all other needs at this time.       Thony Mann RN  07/14/22 7098

## 2022-07-14 NOTE — ED TRIAGE NOTES
Telephone Call from Dr. Dora Wallace    With complex medical history including diabetes mellitus, on insulin. Patient currently in the office with nausea, vomiting, diarrhea. Also has left lower extremity leg wound that scab fell off recently. Dr. Den Gutierrez who is for inpatient emergency department for further evaluation out of concern for infection including C. Difficile.     Angel Greer MD

## 2022-07-15 VITALS
OXYGEN SATURATION: 97 % | RESPIRATION RATE: 18 BRPM | HEART RATE: 87 BPM | TEMPERATURE: 97.8 F | DIASTOLIC BLOOD PRESSURE: 74 MMHG | SYSTOLIC BLOOD PRESSURE: 152 MMHG

## 2022-07-15 LAB
ADENOVIRUS F 40 41 PCR: NOT DETECTED
ANION GAP SERPL CALCULATED.3IONS-SCNC: 11 MEQ/L (ref 8–16)
ASTROVIRUS PCR: NOT DETECTED
BUN BLDV-MCNC: 12 MG/DL (ref 7–22)
CALCIUM SERPL-MCNC: 8.7 MG/DL (ref 8.5–10.5)
CAMPYLOBACTER PCR: NOT DETECTED
CHLORIDE BLD-SCNC: 100 MEQ/L (ref 98–111)
CLOSTRIDIUM DIFFICILE, PCR: NOT DETECTED
CO2: 24 MEQ/L (ref 23–33)
CREAT SERPL-MCNC: 0.7 MG/DL (ref 0.4–1.2)
CRYPTOSPORIDIUM PCR: NOT DETECTED
CYCLOSPORA CAYETANENSIS PCR: NOT DETECTED
E COLI 0157 PCR: NORMAL
E COLI ENTEROAGGREGATIVE PCR: NOT DETECTED
E COLI ENTEROPATHOGENIC PCR: NOT DETECTED
E COLI ENTEROTOXIGENIC PCR: NOT DETECTED
E COLI SHIGA LIKE TOXIN PCR: NOT DETECTED
E COLI SHIGELLA/ENTEROINVASIVE PCR: NOT DETECTED
E HISTOLYTICA GI FILM ARRAY: NOT DETECTED
ERYTHROCYTE [DISTWIDTH] IN BLOOD BY AUTOMATED COUNT: 14.5 % (ref 11.5–14.5)
ERYTHROCYTE [DISTWIDTH] IN BLOOD BY AUTOMATED COUNT: 46.7 FL (ref 35–45)
GFR SERPL CREATININE-BSD FRML MDRD: > 90 ML/MIN/1.73M2
GIARDIA LAMBLIA PCR: NOT DETECTED
GLUCOSE BLD-MCNC: 226 MG/DL (ref 70–108)
GLUCOSE BLD-MCNC: 241 MG/DL (ref 70–108)
GLUCOSE BLD-MCNC: 287 MG/DL (ref 70–108)
HCT VFR BLD CALC: 40.1 % (ref 42–52)
HEMOGLOBIN: 13.7 GM/DL (ref 14–18)
MCH RBC QN AUTO: 30.8 PG (ref 26–33)
MCHC RBC AUTO-ENTMCNC: 34.2 GM/DL (ref 32.2–35.5)
MCV RBC AUTO: 90.1 FL (ref 80–94)
NOROVIRUS GI GII PCR: NOT DETECTED
OSMOLALITY CALCULATION: 276.9 MOSMOL/KG (ref 275–300)
PLATELET # BLD: 206 THOU/MM3 (ref 130–400)
PLESIOMONAS SHIGELLOIDES PCR: NOT DETECTED
PMV BLD AUTO: 9.8 FL (ref 9.4–12.4)
POTASSIUM REFLEX MAGNESIUM: 3.9 MEQ/L (ref 3.5–5.2)
RBC # BLD: 4.45 MILL/MM3 (ref 4.7–6.1)
ROTAVIRUS A PCR: NOT DETECTED
SALMONELLA PCR: NOT DETECTED
SAPOVIRUS PCR: NOT DETECTED
SODIUM BLD-SCNC: 135 MEQ/L (ref 135–145)
VIBRIO CHOLERAE PCR: NOT DETECTED
VIBRIO PCR: NOT DETECTED
WBC # BLD: 11 THOU/MM3 (ref 4.8–10.8)
YERSINIA ENTEROCOLITICA PCR: NOT DETECTED

## 2022-07-15 PROCEDURE — 2580000003 HC RX 258: Performed by: STUDENT IN AN ORGANIZED HEALTH CARE EDUCATION/TRAINING PROGRAM

## 2022-07-15 PROCEDURE — 96361 HYDRATE IV INFUSION ADD-ON: CPT

## 2022-07-15 PROCEDURE — 82705 FATS/LIPIDS FECES QUAL: CPT

## 2022-07-15 PROCEDURE — G0378 HOSPITAL OBSERVATION PER HR: HCPCS

## 2022-07-15 PROCEDURE — 36415 COLL VENOUS BLD VENIPUNCTURE: CPT

## 2022-07-15 PROCEDURE — 80048 BASIC METABOLIC PNL TOTAL CA: CPT

## 2022-07-15 PROCEDURE — 82948 REAGENT STRIP/BLOOD GLUCOSE: CPT

## 2022-07-15 PROCEDURE — 83630 LACTOFERRIN FECAL (QUAL): CPT

## 2022-07-15 PROCEDURE — 83993 ASSAY FOR CALPROTECTIN FECAL: CPT

## 2022-07-15 PROCEDURE — 85027 COMPLETE CBC AUTOMATED: CPT

## 2022-07-15 PROCEDURE — 99217 PR OBSERVATION CARE DISCHARGE MANAGEMENT: CPT | Performed by: INTERNAL MEDICINE

## 2022-07-15 PROCEDURE — 87507 IADNA-DNA/RNA PROBE TQ 12-25: CPT

## 2022-07-15 PROCEDURE — 6370000000 HC RX 637 (ALT 250 FOR IP): Performed by: STUDENT IN AN ORGANIZED HEALTH CARE EDUCATION/TRAINING PROGRAM

## 2022-07-15 RX ADMIN — LISINOPRIL 10 MG: 10 TABLET ORAL at 08:57

## 2022-07-15 RX ADMIN — GABAPENTIN 400 MG: 400 CAPSULE ORAL at 08:57

## 2022-07-15 RX ADMIN — INSULIN LISPRO 6 UNITS: 100 INJECTION, SOLUTION INTRAVENOUS; SUBCUTANEOUS at 08:58

## 2022-07-15 RX ADMIN — ARIPIPRAZOLE 5 MG: 5 TABLET ORAL at 08:57

## 2022-07-15 RX ADMIN — INSULIN GLARGINE 80 UNITS: 100 INJECTION, SOLUTION SUBCUTANEOUS at 08:58

## 2022-07-15 RX ADMIN — SODIUM CHLORIDE: 9 INJECTION, SOLUTION INTRAVENOUS at 05:31

## 2022-07-15 RX ADMIN — LEVOTHYROXINE SODIUM 50 MCG: 0.05 TABLET ORAL at 06:01

## 2022-07-15 RX ADMIN — PANTOPRAZOLE SODIUM 40 MG: 40 TABLET, DELAYED RELEASE ORAL at 06:01

## 2022-07-15 RX ADMIN — METOPROLOL TARTRATE 50 MG: 50 TABLET, FILM COATED ORAL at 08:57

## 2022-07-15 RX ADMIN — SERTRALINE 100 MG: 100 TABLET, FILM COATED ORAL at 08:57

## 2022-07-15 NOTE — PLAN OF CARE
Patient discharged home. Problem: Pain  Goal: Verbalizes/displays adequate comfort level or baseline comfort level  7/15/2022 1410 by Cassidy Garcia RN  Outcome: Completed  Flowsheets (Taken 7/15/2022 0809)  Verbalizes/displays adequate comfort level or baseline comfort level: Encourage patient to monitor pain and request assistance  7/15/2022 0448 by Kathia Malik RN  Outcome: Progressing  7/15/2022 0447 by Kathia Malik RN  Outcome: Progressing  Note: Patient denies pain at this time; pain assessment ongoing. Problem: Safety - Adult  Goal: Free from fall injury  7/15/2022 1410 by Cassidy Garcia RN  Outcome: Completed  Flowsheets (Taken 7/15/2022 1108)  Free From Fall Injury: Instruct family/caregiver on patient safety  7/15/2022 0448 by Kathia Malik RN  Outcome: Progressing  7/15/2022 0447 by Kathia Malik RN  Outcome: Progressing  Note: Patient's safety maintained. Fall precautions in place. Call light and possessions within reach, chair alarm on, non skid socks on, calls out appropriately for help. Problem: Gastrointestinal - Adult  Goal: Minimal or absence of nausea and vomiting  7/15/2022 1410 by Cassidy Garcia RN  Outcome: Completed  Flowsheets (Taken 7/15/2022 0809)  Minimal or absence of nausea and vomiting:   Administer IV fluids as ordered to ensure adequate hydration   Maintain NPO status until nausea and vomiting are resolved   Administer ordered antiemetic medications as needed   Provide nonpharmacologic comfort measures as appropriate  7/15/2022 0448 by Kathia Malik RN  Outcome: Progressing  Note: Patient has no episode of nausea and vomiting at this time; on IV fluids for hydration.   7/15/2022 0447 by Kathia Malik RN  Outcome: Progressing  Goal: Maintains or returns to baseline bowel function  7/15/2022 1410 by Cassidy Garcia RN  Outcome: Completed  7/15/2022 0448 by Kathia Malik RN  Outcome: Progressing  Note: Patient has no diarrhea episode at this time; BS active. 7/15/2022 0447 by Geovanna Blake RN  Outcome: Progressing     Problem: Metabolic/Fluid and Electrolytes - Adult  Goal: Glucose maintained within prescribed range  7/15/2022 1410 by Delia Moreno RN  Outcome: Completed  Flowsheets (Taken 7/15/2022 0809)  Glucose maintained within prescribed range:   Monitor blood glucose as ordered   Assess for signs and symptoms of hyperglycemia and hypoglycemia   Administer ordered medications to maintain glucose within target range   Assess barriers to adequate nutritional intake and initiate nutrition consult as needed   Instruct patient on self management of diabetes and initiate consult as needed  7/15/2022 0448 by Geovanna Blake RN  Outcome: Progressing  Note: Patient on BG monitoring and insulin given as ordered. 7/15/2022 0447 by Geovanna Blake RN  Outcome: Progressing     Problem: Discharge Planning  Goal: Discharge to home or other facility with appropriate resources  7/15/2022 1410 by Delia Moreno RN  Outcome: Completed  Flowsheets (Taken 7/15/2022 0809)  Discharge to home or other facility with appropriate resources:   Identify barriers to discharge with patient and caregiver   Arrange for needed discharge resources and transportation as appropriate   Identify discharge learning needs (meds, wound care, etc)   Arrange for interpreters to assist at discharge as needed   Refer to discharge planning if patient needs post-hospital services based on physician order or complex needs related to functional status, cognitive ability or social support system  7/15/2022 0448 by Geovanna Blake RN  Outcome: Progressing  Note: Patient to return home with friends at discharge.   7/15/2022 0447 by Geovanna Blake RN  Outcome: Progressing     Problem: Chronic Conditions and Co-morbidities  Goal: Patient's chronic conditions and co-morbidity symptoms are monitored and maintained or improved  Outcome: Completed  Flowsheets (Taken 7/15/2022 0809)  Care Plan - Patient's Chronic Conditions and Co-Morbidity Symptoms are Monitored and Maintained or Improved:   Monitor and assess patient's chronic conditions and comorbid symptoms for stability, deterioration, or improvement   Collaborate with multidisciplinary team to address chronic and comorbid conditions and prevent exacerbation or deterioration   Update acute care plan with appropriate goals if chronic or comorbid symptoms are exacerbated and prevent overall improvement and discharge     Problem: Skin/Tissue Integrity  Goal: Absence of new skin breakdown  Description: 1. Monitor for areas of redness and/or skin breakdown  2. Assess vascular access sites hourly  3. Every 4-6 hours minimum:  Change oxygen saturation probe site  4. Every 4-6 hours:  If on nasal continuous positive airway pressure, respiratory therapy assess nares and determine need for appliance change or resting period.   Outcome: Completed

## 2022-07-15 NOTE — PLAN OF CARE
Problem: Pain  Goal: Verbalizes/displays adequate comfort level or baseline comfort level  7/15/2022 0447 by Kalli Cooper RN  Outcome: Progressing  Note: Patient denies pain at this time; pain assessment ongoing. Problem: Safety - Adult  Goal: Free from fall injury  7/15/2022 0447 by Kalli Cooper RN  Outcome: Progressing  Note: Patient's safety maintained. Fall precautions in place. Call light and possessions within reach, chair alarm on, non skid socks on, calls out appropriately for help. Problem: Gastrointestinal - Adult  Goal: Minimal or absence of nausea and vomiting  7/15/2022 0448 by Kalli Cooper RN  Outcome: Progressing  Note: Patient has no episode of nausea and vomiting at this time; on IV fluids for hydration. Goal: Maintains or returns to baseline bowel function  7/15/2022 0448 by Kalli Cooper RN  Outcome: Progressing  Note: Patient has no diarrhea episode at this time; BS active. Problem: Metabolic/Fluid and Electrolytes - Adult  Goal: Glucose maintained within prescribed range  7/15/2022 0448 by Kalli Cooper RN  Outcome: Progressing  Note: Patient on BG monitoring and insulin given as ordered. Problem: Discharge Planning  Goal: Discharge to home or other facility with appropriate resources  7/15/2022 0448 by Kalli Cooper RN  Outcome: Progressing  Note: Patient to return home with friends at discharge. Social work consulted for discharge needs. Care plan reviewed with patient. Patient verbalize understanding of the plan of care and contribute to goal setting.

## 2022-07-15 NOTE — CARE COORDINATION
7/15/22, 7:26 AM EDT  DISCHARGE PLANNING EVALUATION:    Abhi Capone       Admitted: 7/14/2022/ [facility] Hospital day: 0   Location: -10/010-A Reason for admit: Septicemia ([address] [A41.9]  Lower abdominal pain [R10.30]  Sepsis ([address] [A41.9]  Diarrhea, unspecified type [R19.7]   PMH:  has a past medical history of Atherosclerotic heart disease of native coronary artery with unspecified angina pectoris, Bipolar 2 disorder ([address] BPH (benign prostatic hyperplasia), COPD (chronic obstructive pulmonary disease) ([address] Diabetes mellitus type 2, uncontrolled ([address] Diabetic peripheral neuropathy ([address] Diabetic polyneuropathy ([address] Diabetic ulcer of right foot associated with type 2 diabetes mellitus ([address] Essential hypertension, GERD (gastroesophageal reflux disease), Hammer toe of left foot, Heart murmur, History of tobacco abuse, Hx of AKA (above knee amputation), right ([address] Hyperlipidemia, Macular edema, diabetic, bilateral ([address] Marijuana abuse in remission, Melena, MRSA (methicillin resistant staph aureus) culture positive, Onychomycosis, Other disorders of kidney and ureter in diseases classified elsewhere, Sleep apnea, Thyroid disease, and 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 Procedure: 7/14: CT abd:The urinary bladder wall appears thickened which may be artifactual related to underdistention. Correlate with urinalysis. No renal calculus or obstructive uropathy is observed. No bowel obstruction or pericolonic inflammation. Hepatic steatosis is unchanged. Stable chronic findings are noted  7/14: CXR:Stable radiographic appearance of the chest. No evidence of an acute process  Barriers to Discharge:  IVF, Cdiff pending  PCP: Ramy Mendoza MD   %  Patient's Healthcare Decision Maker: Named in [place] [address]  Patient Goals/Plan/Treatment Preferences: Spoke with pt. He lives with friends. Has wheelchair, walker, prosthetic right leg.  He no longer drives but has a good support system within his friends and they assist in transport when need be. Declines need for additional DME/HH upon discharge. Will continue to follow for home going needs  Transportation/Food Security/Housekeeping Addressed:  No issues identified. 7/15/22, 11:06 AM EDT    Patient goals/plan/ treatment preferences discussed by  and . Patient goals/plan/ treatment preferences reviewed with patient/ family. Patient/ family verbalize understanding of discharge plan and are in agreement with goal/plan/treatment preferences. Understanding was demonstrated using the teach back method. AVS provided by RN at time of discharge, which includes all necessary medical information pertaining to the patients current course of illness, treatment, post-discharge goals of care, and treatment preferences.      Services At/After Discharge: None       IMM Letter  Observation Status Letter date given[de-identified] 07/14/22  Observation Status Letter time given[de-identified] 9919  Observation Status Letter given to Patient/Family/Significant other/Guardian/POA/by[de-identified] Pt access

## 2022-07-15 NOTE — DISCHARGE SUMMARY
DISCHARGE SUMMARY NOTE    Patient - Elodia Wu   MRN -  [id number]   Acct # - [de-identified]   - 1970      Date of Admission -  2022  2:10 PM  Date of Discharge-  7/15/2022  Length of Stay - 0 days  Code Status:  Full Code  Attending Physician: Dr. Cora Fong MD  Primary Care Physician - Angela Pedroza MD     Chief Complaint:     C/Marlen Ignacio Alexis 1106 Problems    Diagnosis Date Noted    Lactic acidosis [E87.2]      Priority: High     Discharge Diagnosis:   Diarrhea   Nausea and Vomiting  Lactic Acidosis   IDDM2   Diabetic Neuropathy   Diabetic Wounds   Hypothyroidism   COPD   Hx Left AKA   Obesity     Consultations:   IP CONSULT TO SOCIAL WORK    HPI:   Hao Reyes is a 44 yo M with a past medical hx of IDDM2 A1c 8.2 on jardiance, metformin, 85u Lantus BID, with Dexcom averaging ~588Y, complicated with severe diabetic neuropathy and LLE ulcer wounds for which he recently completed a course of antibiotics for last week. Additional Hx includes COPD, right AKA, bipolar/depression on Abilify and Zoloft. He was sent to [facility] ED on 22 directly from PCP Dr. Marcelo Ramos for further evaluation of his n/v/d. Patient reports he's been having n/v/d for a little over a month now and does not understand why. Says stools are always runny, nonbloody, and dark in color. Per chart review, he's had this same exact presentation in the past and GIB was ruled out each time. Hgb remains stable >15. Patient reports his Lenor Moles" is more like abdominal cramping and takes place after meals. His bouts of emesis have been intermittent in the past few months. There was some concern for possible C. Diff. Brief Hospital Course: In the ED, patient presented with , RR 17, /85 mmHg, O2 Sat 95% on room air. Lab workup revealed lactic acidosis with levels 2.6, WBC 14 on CBC. Otherwise unremarkable workup. Stool was not able to be collected for panel. CT abdomen was also unremarkable with no signs of infection/colitis. Blood cultures were obtained and patient was subsequently admitted for obs/ C. Diff rule out. Patient was monitored overnight given IV Flagyll and Rocephin in ED along with 1.0 L bolus which resolved his lactic acidosis. He was kept almost 24 hrs in our facility and wasn't able to produce a single bowel movement to further evaluate with stool studies. Given the chronicity of his diarrhea, patient is likely suffering from some sort of malabsorptive condition such as lactose intolerance. Patient does not follow a strict diet at this time. He was thus discharged to home in stable medical condition on 07/15 around noon. Immediately as he was getting discharged, he was able to provide us with a sample for labs. We will send these labs out and CC PCP and notify patient if they come back abnormal. In the interim recommend outpatient followups and management. Physical Exam   General appearance - alert, well appearing, and in no distress and overweight, in a wheel chair  Mental status - alert and oriented         Neck - supple, no significant adenopathy  Chest - clear to auscultation, no wheezes, rales or rhonchi, symmetric air entry  Heart - normal rate, regular rhythm, normal S1, S2, no murmurs, rubs, clicks or gallops  Abdomen - soft, nontender, nondistended, no masses or organomegaly  no abdominal bruits. Obese Protuberant: No  Neurological - alert, oriented, normal speech, no focal findings or movement disorder noted, motor and sensory grossly normal bilaterally  Musculoskeletal - no joint tenderness, deformity or swelling  RLE AKA  Extremities - peripheral pulses normal, no pedal edema, no clubbing or cyanosis, Eugene's sign negative, RLE  AKA  Prosthesis: No  Skin - normal coloration and turgor,  LLE had an ulcer, completed antibiotics. Radiology      XR CHEST PORTABLE   Final Result   Stable radiographic appearance of the chest. No evidence of an acute process.                **This report has been created using voice recognition software. It may contain minor errors which are inherent in voice recognition technology. **      Final report electronically signed by Dr. Leo Chester MD on 7/14/2022 5:00 PM      CT ABDOMEN PELVIS W IV CONTRAST Additional Contrast? None   Final Result   1. The urinary bladder wall appears thickened which may be artifactual related to underdistention. Correlate with urinalysis. No renal calculus or obstructive uropathy is observed. 2. No bowel obstruction or pericolonic inflammation. Hepatic steatosis is unchanged. Stable chronic findings are noted. **This report has been created using voice recognition software. It may contain minor errors which are inherent in voice recognition technology. **      Final report electronically signed by Dr Dipika Harris on 7/14/2022 3:51 PM        Labs:   CBC:    Lab Results   Component Value Date/Time    WBC 11.0 07/15/2022 05:25 AM    HGB 13.7 07/15/2022 05:25 AM    HCT 40.1 07/15/2022 05:25 AM     07/15/2022 05:25 AM     Renal:    Lab Results   Component Value Date/Time     07/15/2022 05:25 AM    K 3.9 07/15/2022 05:25 AM     07/15/2022 05:25 AM    CO2 24 07/15/2022 05:25 AM    BUN 12 07/15/2022 05:25 AM    CREATININE 0.7 07/15/2022 05:25 AM    CALCIUM 8.7 07/15/2022 05:25 AM    PHOS 2.6 02/16/2019 06:50 AM     Disposition:   Stable to home on 7/15/2022    Discharge Medications:         Medication List        CONTINUE taking these medications      ARIPiprazole 5 MG tablet  Commonly known as: ABILIFY     atorvastatin 40 MG tablet  Commonly known as: LIPITOR  Take 1 tablet by mouth nightly     Dexcom G6  Lelia  1 Device by Does not apply route 4 times daily (before meals and nightly)     Dexcom G6 Sensor Misc  1 Device by Does not apply route every 14 days     Dexcom G6 Transmitter Misc  1 Device by Does not apply route every 3 months     empagliflozin 25 MG tablet  Commonly known as: Jardiance  TAKE 1 TABLET BY MOUTH ONCE DAILY. gabapentin 400 MG capsule  Commonly known as: NEURONTIN  Change to 400 mg TID     Insulin Glargine (2 Unit Dial) 300 UNIT/ML Sopn  Change to 85 U in am , 85 U PM     insulin lispro 200 UNIT/ML Sopn pen  Commonly known as: HUMALOG KWIKPEN U-200  40 units Plus scale before meals; only takes if BG >200 - Max dose 160 units per day     levothyroxine 50 MCG tablet  Commonly known as: SYNTHROID  Take 1 tablet by mouth daily     * lisinopril 5 MG tablet  Commonly known as: PRINIVIL;ZESTRIL  Take 1 tablet by mouth daily     * lisinopril 10 MG tablet  Commonly known as: PRINIVIL;ZESTRIL  Take 1 tablet by mouth daily     metoprolol tartrate 50 MG tablet  Commonly known as: Lopressor  Take 1 tablet by mouth 2 times daily     nitroGLYCERIN 0.3 MG SL tablet  Commonly known as: Nitrostat  Place 1 tablet under the tongue every 5 minutes as needed for Chest pain up to max of 3 total doses. If no relief after 1 dose, call 911.     sertraline 100 MG tablet  Commonly known as: ZOLOFT     TRUEplus Pen Needles 31G X 8 MM Misc  Generic drug: Insulin Pen Needle  USE AS DIRECTED     Trulicity 4.5 AW/0.1IW Sopn  Generic drug: Dulaglutide  Inject 4.5 mg into the skin once a week *dose increase           * This list has 2 medication(s) that are the same as other medications prescribed for you. Read the directions carefully, and ask your doctor or other care provider to review them with you.                 ASK your doctor about these medications      famotidine 40 MG tablet  Commonly known as: PEPCID     omeprazole 40 MG delayed release capsule  Commonly known as: PRILOSEC            Follow-up Appointments:       Electronically signed by   Marylee Hong, DO on 7/15/2022 at 1:39 PM

## 2022-07-15 NOTE — PROGRESS NOTES
[address] [address] Continence Nurse  Progress Note       Adrian French  AGE: 45 y.o. GENDER: male  : 1970  UNIT: 5K-10/010-A  TODAY'S DATE:  7/15/2022  ADMISSION DATE: 2022  2:10 PM  Subjective:     Reason for [address] [facility] [facility] Evaluation and Assessment: LLE wound      Adrian French is a 45 y.o. male referred by:   [] Physician/PA/APRN  [x] Nursing  [] Other:     Wound Identification:  Wound Type: venous  Contributing Factors: edema and venous stasis    Objective:     Ezio Risk Score: Ezio Scale Score: 18    Assessment:     Encounter: Present to pt room for LLE wound. Pt resting in reclining chair. Pt noted to have healing venous wound that is granular and superficial. Pt does noted to have pitting edema in LLE which is most likely contributing to non healing wound. Cleansed wound with normal saline and gauze. Pat dry with clean gauze. Applied bordered foam dressing to this area. Pt pedal pulse weak and foot cool. Pt would benefit from compression to lower extremity however will need order from provider pending vascular testing if warranted (last SHADY's noted in 2019). Recommend dressing to be changed 3 times weekly. Pt in chair, call light in reach. Wound type: Left lower extremity wound  Wound size: 4cm x 1.5cm x 0.05cm  Undermining or Tunneling: none  Wound assessment/color: red/pink, epi  Drainage amount: small  Drainage description: serous  Odor: none  Margins: attached  Louise wound: dry, pitting edema  Exposed structure: none        Response to treatment:  Well tolerated by patient. Plan:     Treatment Recommendations:   LLE wound: Cleanse wound with normal saline and gauze. Pat dry with clean gauze. Apply bordered foam dressing. Change three times weekly and as needed. Specialty Bed Required : No support surface noted. Will need support surface.   [] Low Air Loss   [] Pressure Redistribution  [] Fluid Immersion- Dolphin  [] Bariatric  [] RotoProne   [] Other:     Discharge Plan:  Placement for patient upon discharge: unknown  Patient appropriate for [address]: as needed

## 2022-07-15 NOTE — PROGRESS NOTES
Discharge paperwork reviewed with patient. Education included changes to medication, discharge instructions, follow-up appointments. All questions answered to patient's satisfaction.

## 2022-07-17 LAB — LACTOFERRIN, FECAL: NORMAL

## 2022-07-18 ENCOUNTER — CARE COORDINATION (OUTPATIENT)
Dept: CASE MANAGEMENT | Age: 52
End: 2022-07-18

## 2022-07-18 LAB
CALPROTECTIN: < 5 UG/G
FECAL FAT, QUALITATIVE: NORMAL

## 2022-07-18 NOTE — CARE COORDINATION
Care Transitions Outreach Attempt-Lactic Acidosis, Abdominal pain, N/V/D    Call within 2 business days of discharge: Yes   Attempted to reach patient for transitions of care follow up. Unable to reach patient. Patient: Olayinka Mcgarry Patient : 1970 MRN: <[id number]>    Last Discharge [facility]       Date Complaint Diagnosis Description Type Department Provider    22 Diarrhea Lower abdominal pain . .. ED to Hosp-Admission (Discharged) (ADMITTED) Ben Blanchard4, DO; Chandni Perez. .. Was this an external facility discharge? No Discharge [facility]    Noted following upcoming appointments from discharge chart review:   [facility] follow up appointment(s):   Future Appointments   Date Time Provider Ebenezer Carmichael   2022  1:30 PM Jane Uribe DO [address] [facility]   2022  3:00 PM Anh Hensley RN \[facility] [place] [facility]\""X Physic [facility] - [address] [facility]   2022  9:15 AM Mekhi Sims MD [address] [facility]       1st attempt to contact patient for initial care transition follow up. Unable to reach patient. Left message with contact information and request for call back.       Rhiannon Evans RN  Care Transition Nurse

## 2022-07-19 ENCOUNTER — CARE COORDINATION (OUTPATIENT)
Dept: CASE MANAGEMENT | Age: 52
End: 2022-07-19

## 2022-07-19 NOTE — CARE COORDINATION
Care Transitions Outreach Attempt-Lactic Acidosis    Call within 2 business days of discharge: Yes   Attempted to reach patient for transitions of care follow up. Unable to reach patient. Patient: Olayinka Mcgarry Patient : 1970 MRN: <[id number]>    Last Discharge [facility]       Date Complaint Diagnosis Description Type Department Provider    22 Diarrhea Lower abdominal pain . .. ED to Hosp-Admission (Discharged) (ADMITTED) Ben Blanchard4, DO; Chandni Perez. .. Was this an external facility discharge? No Discharge Facility: [facility]    Noted following upcoming appointments from discharge chart review:   [facility] follow up appointment(s):   Future Appointments   Date Time Provider Ebenezer Carmichael   2022  1:30 PM Jane Uribe DO [address] [facility]   2022  3:00 PM Anh Hensley RN SRPX Physic [facility] [place]   2022 11:00 AM Dorie Spatz[address]   2022  9:15 AM Mekhi Sims MD [address] [facility]         2nd attempt to contact patient for initial care transition follow up. Unable to reach patient. Left message with contact information and request for call back. Also attempted to call patient's EC. No answer. Episode to be resolved and CTN to sign off if return call is not received from the patient.       Rhiannno Evans RN   Care Transition Nurse

## 2022-07-20 LAB
BLOOD CULTURE, ROUTINE: NORMAL
BLOOD CULTURE, ROUTINE: NORMAL

## 2022-07-26 ENCOUNTER — OFFICE VISIT (OUTPATIENT)
Dept: INTERNAL MEDICINE CLINIC | Age: 52
End: 2022-07-26
Payer: MEDICARE

## 2022-07-26 VITALS
TEMPERATURE: 98.4 F | WEIGHT: 225 LBS | HEART RATE: 102 BPM | BODY MASS INDEX: 36.32 KG/M2 | SYSTOLIC BLOOD PRESSURE: 130 MMHG | DIASTOLIC BLOOD PRESSURE: 84 MMHG

## 2022-07-26 DIAGNOSIS — F31.9 BIPOLAR 1 DISORDER (HCC): ICD-10-CM

## 2022-07-26 DIAGNOSIS — E11.65 UNCONTROLLED TYPE 2 DIABETES MELLITUS WITH HYPERGLYCEMIA, WITH LONG-TERM CURRENT USE OF INSULIN (HCC): Chronic | ICD-10-CM

## 2022-07-26 DIAGNOSIS — Z79.4 UNCONTROLLED TYPE 2 DIABETES MELLITUS WITH HYPERGLYCEMIA, WITH LONG-TERM CURRENT USE OF INSULIN (HCC): Chronic | ICD-10-CM

## 2022-07-26 DIAGNOSIS — K92.2 GASTROINTESTINAL HEMORRHAGE, UNSPECIFIED GASTROINTESTINAL HEMORRHAGE TYPE: ICD-10-CM

## 2022-07-26 DIAGNOSIS — K52.9 CHRONIC DIARRHEA: Primary | ICD-10-CM

## 2022-07-26 PROCEDURE — 99213 OFFICE O/P EST LOW 20 MIN: CPT | Performed by: STUDENT IN AN ORGANIZED HEALTH CARE EDUCATION/TRAINING PROGRAM

## 2022-07-26 PROCEDURE — 3052F HG A1C>EQUAL 8.0%<EQUAL 9.0%: CPT | Performed by: STUDENT IN AN ORGANIZED HEALTH CARE EDUCATION/TRAINING PROGRAM

## 2022-07-26 RX ORDER — TIRZEPATIDE 5 MG/.5ML
5 INJECTION, SOLUTION SUBCUTANEOUS WEEKLY
Qty: 2 ML | Refills: 0 | Status: SHIPPED | OUTPATIENT
Start: 2022-07-26 | End: 2022-10-03

## 2022-07-26 NOTE — PROGRESS NOTES
any chest pain or palpitations    History of tobacco abuse     Hx of AKA (above knee amputation), right ([facility] 04/18/2019    Dr. Zari Huggins    Hyperlipidemia     Macular edema, diabetic, bilateral ([address] 05/04/2018    Dr. 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 ([facility] 6/29/2016       Past Surgical History:   Procedure Laterality Date    ABDOMEN SURGERY      ABSCESS DRAINAGE Right     foot    AMPUTATION ABOVE KNEE Right 4/18/2019    RIGHT ABOVE KNEE AMPUTATION performed by Mago Copeland MD at [facility] LAPAROSCOPIC N/A 4/1/2020    ROBOTIC CHOLECYSTECTOMY performed by Guerita Magallanes MD at [facility] N/A 7/20/2020    CYSTOSCOPY performed by Shailesh Clayton MD at [facility] 8/21/2020    CYSTOSCOPY, UROLIFT performed by Shailesh Clayton MD at [address] [facility], ESOPHAGUS      ENDOSCOPY, COLON, DIAGNOSTIC      FOOT DEBRIDEMENT Right 07/01/2016    I & D    GASTROCNEMIUS RECESSION Left 11/12/2021    LEFT LEG GASTROCS RECESSION performed by Citlalli Mari MD at [address] [facility] Right 2/18/2019    I&D RIGHT STUMP performed by Mago Copeland MD at [address] [facility] Right 07/20/2016    LEG AMPUTATION BELOW KNEE Right 4/4/2019    I&D AND REVISION OF AMPUTATION RIGHT LEG performed by Mago Copeland MD at [address] [facility] Right 1/14/15    sole of foot I&D    LA DRAIN INFECT SHOULDER BURSA Left 8/18/2017    LEFT SHOULDER INCISION AND DRAINAGE performed by Mago Copeland MD at [address] [facility] OFFICE/OUTPT [address] [facility] Right 9/20/2018    EXCISIONAL DEBRIDEMENT RIGHT BKA STUMP performed by Arley Mo MD at [address] Right 1/16/15    2nd toe with wound vac applied    UPPER GASTROINTESTINAL ENDOSCOPY N/A 3/3/2020    EGD DILATION SAVORY performed by Michelle Barlow MD at [address] [facility] N/A 6/15/2022    EGD DILATION BALLOON performed by Edvin Gill MD at [facility]  ? when       Current Outpatient Medications   Medication Sig Dispense Refill    Tirzepatide (MOUNJARO) 5 MG/0.5ML SOPN Inject 5 mg into the skin once a week 2 mL 0    insulin lispro (HUMALOG KWIKPEN U-200) 200 UNIT/ML SOPN pen 40 units Plus scale before meals; only takes if BG >200 - Max dose 160 units per day 30 pen 2    levothyroxine (SYNTHROID) 50 MCG tablet Take 1 tablet by mouth daily 90 tablet 1    empagliflozin (JARDIANCE) 25 MG tablet TAKE 1 TABLET BY MOUTH ONCE DAILY. 30 tablet 5    gabapentin (NEURONTIN) 400 MG capsule Change to 400 mg TID 90 capsule 1    Insulin Glargine, 2 Unit Dial, 300 UNIT/ML SOPN Change to 85 U in am , 85 U PM 15 pen 1    Dulaglutide (TRULICITY) 4.5 CA/9.3VW SOPN Inject 4.5 mg into the skin once a week *dose increase 4 pen 3    lisinopril (PRINIVIL;ZESTRIL) 10 MG tablet Take 1 tablet by mouth daily 90 tablet 1    nitroGLYCERIN (NITROSTAT) 0.3 MG SL tablet Place 1 tablet under the tongue every 5 minutes as needed for Chest pain up to max of 3 total doses.  If no relief after 1 dose, call 911. 30 tablet 3    atorvastatin (LIPITOR) 40 MG tablet Take 1 tablet by mouth nightly 30 tablet 3    metoprolol tartrate (LOPRESSOR) 50 MG tablet Take 1 tablet by mouth 2 times daily 90 tablet 3    Insulin Pen Needle (TRUEPLUS PEN NEEDLES) 31G X 8 MM MISC USE AS DIRECTED 200 each 1    sertraline (ZOLOFT) 100 MG tablet Take 100 mg by mouth daily       Continuous Blood Gluc Transmit (DEXCOM G6 TRANSMITTER) MISC 1 Device by Does not apply route every 3 months 1 each 3    Continuous Blood Gluc Sensor (DEXCOM G6 SENSOR) MISC 1 Device by Does not apply route every 14 days 9 each 3    Continuous Blood Gluc  (DEXCOM G6 ) LIANNE 1 Device by Does not apply route 4 times daily (before meals and nightly) 1 Device 0    ARIPiprazole (ABILIFY) 5 MG tablet Take 5 mg by mouth daily        No current facility-administered medications for this visit. Allergies   Allergen Reactions    Pcn [Penicillins] Shortness Of Breath, Nausea And Vomiting and Other (See Comments)     Does not remember reactions. Has TOLERATED amoxicillin and several different cephalosporins. Actos [Pioglitazone] Swelling    Clindamycin/Lincomycin Itching    Vancomycin Hives      Rash, with erythematous plaques to abdomen, shoulders, and proximal upper extremities with pruritis, persisted with 2nd dose administered slower.       Metformin And Related Swelling       Social History     Socioeconomic History    Marital status:      Spouse name: Not on file    Number of children: 2    Years of education: 13    Highest education level: Not on file   Occupational History    Not on file   Tobacco Use    Smoking status: Former     Packs/day: 0.00     Years: 10.00     Pack years: 0.00     Types: Cigars, Cigarettes     Start date: 1985     Quit date:      Years since quittin.9    Smokeless tobacco: Never   Vaping Use    Vaping Use: Former    Substances: Nicotine, Flavoring   Substance and Sexual Activity    Alcohol use: Not Currently     Alcohol/week: 0.0 standard drinks    Drug use: No     Comment: 30 YEARS AGO    Sexual activity: Yes     Partners: Female   Other Topics Concern    Not on file   Social History Narrative    Not on file     Social Determinants of Health     Financial Resource Strain: Low Risk     Difficulty of Paying Living Expenses: Not hard at all   Food Insecurity: No Food Insecurity    Worried About [address] in the Last Year: Never true    [address]  [employer] in the Last Year: Never true   Transportation Needs: Not on file   Physical Activity: Not on file   Stress: Not on file   Social Connections: Not on file   Intimate Partner Violence: Not on file   Housing Stability: Not on file       Family History   Problem Relation Age of Onset    Diabetes Mother     Other Mother         pneumonia, H1N1    Depression Mother     Early Death Mother     High Blood Pressure Mother     High Cholesterol Mother     Vision Loss Maternal Grandmother     Arthritis Maternal Grandfather     Heart Disease Maternal Grandfather        Review of Systems - General ROS: Positive for weight loss and night sweats. negative for chills or fever  Psychological ROS: anxiety and depression - controlled with meds. Hematological and Lymphatic ROS: No history of blood clots or bleeding disorder. Respiratory ROS: Positive for cough, shortness of breath, no wheezing  Cardiovascular ROS: positive for chest pain no dyspnea on exertion,  Gastrointestinal ROS: No change in bowel habits, Positive for Black stools  Genito-Urinary ROS: no dysuria, trouble voiding, or hematuria  Musculoskeletal ROS: negative for - muscle pain or muscular weakness, positive neuropathy in hands  Neurological ROS: negative for - headaches, seizures or weakness, Positive for numbness/tingling neuropathy in hands. Dermatological ROS: negative for LLE rash or skin lesion changes  Missing middle toenail. Dry skin on that leg. Open healing lesions on shin. Blood pressure 130/84, pulse (!) 102, temperature 98.4 °F (36.9 °C), weight 225 lb (102.1 kg).     Physical Examination: General appearance -alert, well appearing, and in no distress  Mental status - alert, oriented to person, place, and time  Head - atraumatic, normocephalic  Eyes - pupils equal and reactive to light, extraocular muscles intact  Ears - external ears are normal, hearing is grossly normal  Mouth - oral pharynx clear, mucous membranes moist. Pt is edentulous, He is Malampati 4  Neck - supple, no significant adenopathy  Chest - clear to auscultation, no wheezes, rales or rhonchi, symmetric air entry  Heart - normal rate, regular rhythm, normal S1, S2, no murmurs, rubs, clicks or gallops  Abdomen -soft, nontender, nondistended  Neurological - alert, oriented, cranial nerves II-XII intact, motor and sensation are grossly intact bilateral upper extremity. little to no sensation in L. Foot. R foot and leg missing due to AKA. Extremities - peripheral pulses normal, no pedal edema, no clubbing or cyanosis  Skin - warm and dry. R leg has healing ulcers. Dry cracking skin over heal and toes. 5th phalanx is missing due to amputation. Middle toe is missing toenail and has dried blood. Does not appear to be infected. Diagnostic data:   I have reviewed recent diagnostic testing including labs with patient today. Assessment and Plan:    Chronic Diarrhea- Pt has been dealing with this for 2+ years  Uncontrolled DM2  Possible GI bleed- Pt reports melena. No evidence of this in hospital  Bipolar Disorder / Anxiety - Pt says well controlled on current regimen        Diagnosis Orders   1. Chronic diarrhea  Tissue Transglutaminase, IgA      2. Uncontrolled type 2 diabetes mellitus with hyperglycemia, with long-term current use of insulin ([facility])  Tirzepatide (MOUNJARO) 5 MG/0.5ML SOPN      3. Gastrointestinal hemorrhage, unspecified gastrointestinal hemorrhage type  Iron    Ferritin    Iron Binding Capacity      4. Bipolar 1 disorder ([facility]          Pt has a wide differential for this. Ran a TTG test for celiac. Refer to GI for workup. Will try pt on Mounjaro to see if he gets better glucose control. We are running iron studies. Also ordered above TTG. Are referring to GI to work up diarrhea and possible GI cause for this. Neg EGD in 2020. Continue on current regimen. F/U if necessary. Pt to follow up in 1 to 2 months.       Staffed with:    Hilda Trevino, DO PGY-1  [address] [facility]  [address]  Dept: [phone]  Dept Fax: [phone] : [phone]

## 2022-07-26 NOTE — PROGRESS NOTES
After pharmacist chart review, the following recommendations are made:    -Discussed that Alvin Boles will likely not be covered due to [employer] insurance; however, can attempt a test claim. Recommended conversion from Trulicity 4.2HM to Mounjaro 5mg.     For [address] in place:  No  Recommendation Provided To: Provider: 1 via Verbally to provider  Intervention Detail: Dose Adjustment: 1, reason: Therapy Optimization  Gap Closed?: No   Intervention Accepted By: Patient/Caregiver: 1  Time Spent (min): [address], PharmD, [facility]  Internal Medicine Clinical Pharmacist  [phone]

## 2022-07-26 NOTE — PATIENT INSTRUCTIONS
Call pharmacy about [facility]. Will mail referral about GI consult. Get lab work done. F/U in a month or two.

## 2022-08-11 ENCOUNTER — HOSPITAL ENCOUNTER (OUTPATIENT)
Dept: OCCUPATIONAL THERAPY | Age: 52
Setting detail: THERAPIES SERIES
Discharge: HOME | End: 2022-08-11
Payer: MEDICARE

## 2022-08-11 PROCEDURE — 97542 WHEELCHAIR MNGMENT TRAINING: CPT

## 2022-08-11 PROCEDURE — 97166 OT EVAL MOD COMPLEX 45 MIN: CPT

## 2022-08-11 RX ORDER — ATORVASTATIN CALCIUM 40 MG/1
40 TABLET, FILM COATED ORAL NIGHTLY
Qty: 30 TABLET | Refills: 1 | Status: SHIPPED | OUTPATIENT
Start: 2022-08-11 | End: 2022-08-29 | Stop reason: SDUPTHER

## 2022-08-11 NOTE — DISCHARGE SUMMARY
Authorization Information:      SUBJECTIVE: Pt. Reports 9/10 pain in both hands and legs due to diabetic neuropathy. States the pain in constant making it difficult to self propel a manual wheelchair. Pt. Would like to get a power wheelchair for functional mobility     Please see medical history questionnaire for information related to prior medical history, allergies and medications. Past Medical History:   Diagnosis Date    Atherosclerotic heart disease of native coronary artery with unspecified angina pectoris 1/18/2022    Bipolar 2 disorder [facility])     previously followed with Dr. Alex Vo and Kianna Zhou in [facility]    BPH (benign prostatic hyperplasia)     COPD (chronic obstructive pulmonary disease) ([address]     Diabetes mellitus type 2, uncontrolled ([address]     HgbA1c on 4/2/2019 was 9.1. Diabetic peripheral neuropathy (HCC)     Diabetic polyneuropathy ([address]     Diabetic ulcer of right foot associated with type 2 diabetes mellitus ([address] 12/10/2015    Essential hypertension     \"never been on b/p medication that I know of\"    GERD (gastroesophageal reflux disease)     Hammer toe of left foot     Heart murmur     denies any chest pain or palpitations    History of tobacco abuse     Hx of AKA (above knee amputation), right ([address] 04/18/2019    Dr. Dione Quintanilla    Hyperlipidemia     Macular edema, diabetic, bilateral ([address] 05/04/2018    Dr. Sonny Jacobo referred to retina specialist for 2nd opinion    Marijuana abuse in remission     Melena     MRSA (methicillin resistant staph aureus) culture positive     Onychomycosis     Other disorders of kidney and ureter in diseases classified elsewhere     Sleep apnea     no cpap    Thyroid disease     WD-Skin ulcer of fourth toe of right foot with necrosis of bone ([address] 6/29/2016       HEIGHT: 66 inches    WEIGHT:  235 pounds    CARDIO-RESPIRATORY STATUS: Pt.  Does have COPD and becomes short of breath with physical activity  CURRENT SEATING/MOBILITY:   Currently utilizing wheelchair:Yes. Manual Wheel Chair        Age of wheelchair: 1 years  Type and age of wheelchair cushion: sling style, 1 years  Type and age of wheelchair back: sling style, 1 years  Current wheelchair needs: needs power chair       COMMUNITY ADL:  Mode of transportation: handicap accessible transportation and Ramp  Driving requirements: Patient uses public transportation    Employment/Educational Requirements: Pt. Lives at [employer] and does have to watch the  during 3rd shift      COMMUNICATION:   Expressive Language: Understandable    Receptive Language:   Understandable      SENSATION/SKIN ISSUES:   History of skin issues: yes   History of skin flap surgeries: amputation of RLE AKA due to nonhealing foot ulcer  Current skin issues: no    Hours in wheelchair per day: 6-8 hours  Sensation (sitting surface): Intact on sitting surface  Consistent pressure relief: independent    COGNITIVE/VISUAL STATUS:   Memory skills: Intact    Judgment: Intact    Attention/concentration: Intact  Vision: Intact    Hearing: Intact         MAT EVALUATION  (completed in seated position)  Posture:   Functional  Head control function:Good  Comments/Support Needed:      SHOULDERS:  Posture of Right Shoulder:Within Functional Limits  Posture of Left Shoulder:Within Functional Limits  Shoulder Range of Motion: Within Functional Limits    Shoulder Strength: 5/5 MMT  Comments/Support Needed: Pt. Has a history of left shoulder infection in 2017 requiring I&D    ELBOWS:   Posture of Right Elbow: Within Functional Limits    Posture of Left Elbow: Within Functional Limits    Elbow Range of Motion: Within Functional Limits    Elbow Strength: 5/5 MMT   Comments/Support Needed:     WRIST AND HAND:  Posture of Right Wrist/Hand: Within Functional Limits    Posture of Left Wrist/Hand: Within Functional Limits    Wrist/Hand Strength/Dexterity:  able to touch fingertips to thumb     Comments/Support Needed: Pt.  Does demo mild tightness of fingers; Pt. Has diabetic neuropathy in both hands causing 9/10 pain and numbness consistently      TRUNK:  Anterior/Posterior:Within Functional Limits and Partly Flexible  Left/Right: Within Functional Limits and Partly Flexible  Rotation:  Within Functional Limits and Partly Flexible  Comments/Support Needed:     PELVIS:  Anterior/Posterior: Within Functional Limits and Partly Flexible  Obliquity:  Within Functional Limits and Partly Flexible   Rotation:  Within  Functional LImits  Comments/Support Needed:     HIPS:   Position:Neutral and Partly Flexible  Windswept: Neutral and Partly Flexible  Comments/Support Needed: Hips are WFL     KNEES AND FEET:  Strength: Pt. Has an AKA on right LE, Left knee is strong  Hamstring Flexibility       Pelvis to Thigh Angle:  Greater Than 90       Thigh to Calf Angle: Greater Than 90     Tone/Contractures: n/a  Foot Positioning:       Right:  n/a       Left:     Left foot and ankle are in a functional position however due to recent achilles tendon surgery earlier this year on LLE, pt. Has very limited dorsiflexion of left foot. Pt. Has foot drop on LLE, no AFO  Comments/Support Needed:     MOBILITY   SITTING BALANCE:  WFL  STANDING BALANCE:  Pt.  Has to use a walker for any standing tasks, and has very limited standing tolerance on LLE  BED TO WHEELCHAIR TRANSFER:  independent  WHEELCHAIR TO COMMODE TRANSFER: independent - using grab bar by the toilet   AMBULATION:   Pt. Not able to ambulate due to RLE AKA and foot drop on LLE    MANUAL WHEELCHAIR PROPULSION: Assisted      OPERATE WHEELCHAIR WITH STANDARD JOYSTICK:  independent      OPERATE WHEELCHAIR WITH ALTERNATIVE CONTROLS:  Not Applicable    WEIGHT SHIFTS: independent        ADL STATUS     DRESSING: Independent    BATHING: Independent - utilizes a tub bench for transfers and is able to sit during bathing    FEEDING:  Independent    GROOMING/HYGIENE: Independent - from wheelchair    TOILETING: Independent    MEAL PREPARATION:  Independent - mostly microwave    HOME MANAGEMENT: Assisted - has someone who does his laundry    BOWEL MANAGEMENT: continent    BLADDER MANAGEMENT:continent      CLINICAL CRITERIA/ALGORITHM SUMMARY     Is there a mobility limitation causing an inability to safely participate in one or more Mobility Related ADL's in a reasonable time frame? Yes - due to right AKA and left foot drop as well as diabetic neuropathy of hands and left foot, pt. Has significant difficulty with manual propulsion of manual wheelchair and is unable to walk using a cane or walker. Are there cognitive deficits that limit the users ability to safely participate in one or more MRADL's?   no    Can the patient be accommodated / compensated for to allow use of a mobility assistive device to participate in [address]? yes    Does the user demonstrate the ability or potential ability and willingness to safely use the mobility assistive device? yes    Can the mobility deficit be sufficiently resolved with only the use of a cane or walker?        no and due to RLE AKA and LLE foot drop as well as limited endurance from COPD and pain in hands from diabetic neuropathy, pt. Is not able to use a cane or walker    Does the user's environment support the use of the type of wheelchair being recommended? SEE VENDOR'S NOTE. If a manual wheelchair is recommended, does the user have sufficient function/abilities to use the recommended equipment? no    If a POV is recommended, does the user have sufficient stability and upper extremity function to operate it?  no    If a power wheelchair is recommended, does the user have sufficient function/abilities to use the recommended equipment?   yes       RECOMMENDATION/GOALS     TYPE OF WHEELCHAIR RECOMMENDED:  Power Wheelchair - [employer] [place] 613 base model [place]  - [employer]  POSITIONING SYSTEMS RECOMMENDED: None    COMPONENTS RECOMMENDED:     2 each - U-1 AGM batteries - needed to power the chair, , new  1 each of right and left memory seat armrests - to support and position UE\"s while seated in chair - , new  1 each - swing away joystick mount right side with mounting brackets - needed to allow the joystick to be moved out of the way for transfers and getting closer to tables for meals etc. , , new      Lexus Saba, OTR/L [id number]

## 2022-08-17 ENCOUNTER — TELEPHONE (OUTPATIENT)
Dept: INTERNAL MEDICINE CLINIC | Age: 52
End: 2022-08-17

## 2022-08-17 NOTE — TELEPHONE ENCOUNTER
I had to leave a voicemail for this patient's call because I called them back. They only relayed they have bright red blood when they have a bowel movement. He did nto relay enough to me, so I told him via voicemail to go to the ER if it gets worse or persists to bleed. I will try to contact the patient tomorrow.

## 2022-08-29 ENCOUNTER — OFFICE VISIT (OUTPATIENT)
Dept: INTERNAL MEDICINE CLINIC | Age: 52
End: 2022-08-29
Payer: MEDICARE

## 2022-08-29 VITALS
DIASTOLIC BLOOD PRESSURE: 78 MMHG | WEIGHT: 225 LBS | TEMPERATURE: 97.8 F | BODY MASS INDEX: 36.32 KG/M2 | SYSTOLIC BLOOD PRESSURE: 116 MMHG | HEART RATE: 64 BPM

## 2022-08-29 DIAGNOSIS — E11.8 DIABETIC FOOT (HCC): ICD-10-CM

## 2022-08-29 DIAGNOSIS — Z79.4 UNCONTROLLED TYPE 2 DIABETES MELLITUS WITH HYPERGLYCEMIA, WITH LONG-TERM CURRENT USE OF INSULIN (HCC): Primary | ICD-10-CM

## 2022-08-29 DIAGNOSIS — E11.65 UNCONTROLLED TYPE 2 DIABETES MELLITUS WITH HYPERGLYCEMIA, WITH LONG-TERM CURRENT USE OF INSULIN (HCC): Primary | ICD-10-CM

## 2022-08-29 DIAGNOSIS — I10 ESSENTIAL HYPERTENSION: ICD-10-CM

## 2022-08-29 PROCEDURE — 3052F HG A1C>EQUAL 8.0%<EQUAL 9.0%: CPT | Performed by: INTERNAL MEDICINE

## 2022-08-29 PROCEDURE — 1036F TOBACCO NON-USER: CPT | Performed by: INTERNAL MEDICINE

## 2022-08-29 PROCEDURE — G8417 CALC BMI ABV UP PARAM F/U: HCPCS | Performed by: INTERNAL MEDICINE

## 2022-08-29 PROCEDURE — 2022F DILAT RTA XM EVC RTNOPTHY: CPT | Performed by: INTERNAL MEDICINE

## 2022-08-29 PROCEDURE — 99214 OFFICE O/P EST MOD 30 MIN: CPT | Performed by: INTERNAL MEDICINE

## 2022-08-29 PROCEDURE — 3017F COLORECTAL CA SCREEN DOC REV: CPT | Performed by: INTERNAL MEDICINE

## 2022-08-29 PROCEDURE — G8427 DOCREV CUR MEDS BY ELIG CLIN: HCPCS | Performed by: INTERNAL MEDICINE

## 2022-08-29 PROCEDURE — 83036 HEMOGLOBIN GLYCOSYLATED A1C: CPT | Performed by: INTERNAL MEDICINE

## 2022-08-29 RX ORDER — ATORVASTATIN CALCIUM 40 MG/1
40 TABLET, FILM COATED ORAL NIGHTLY
Qty: 30 TABLET | Refills: 2 | Status: SHIPPED | OUTPATIENT
Start: 2022-08-29 | End: 2022-10-03

## 2022-08-29 RX ORDER — LISINOPRIL 10 MG/1
10 TABLET ORAL DAILY
Qty: 90 TABLET | Refills: 1 | Status: SHIPPED | OUTPATIENT
Start: 2022-08-29

## 2022-08-29 RX ORDER — BLOOD-GLUCOSE SENSOR
1 EACH MISCELLANEOUS
Qty: 9 EACH | Refills: 3 | Status: SHIPPED | OUTPATIENT
Start: 2022-08-29

## 2022-08-29 RX ORDER — BLOOD-GLUCOSE TRANSMITTER
1 EACH MISCELLANEOUS
Qty: 1 EACH | Refills: 3 | Status: SHIPPED | OUTPATIENT
Start: 2022-08-29

## 2022-08-29 RX ORDER — MECLIZINE HYDROCHLORIDE 25 MG/1
25 TABLET ORAL 3 TIMES DAILY PRN
Qty: 30 TABLET | Refills: 1 | Status: SHIPPED | OUTPATIENT
Start: 2022-08-29 | End: 2022-09-08

## 2022-08-29 RX ORDER — DULAGLUTIDE 4.5 MG/.5ML
4.5 INJECTION, SOLUTION SUBCUTANEOUS WEEKLY
Qty: 4 PEN | Refills: 3 | Status: SHIPPED | OUTPATIENT
Start: 2022-08-29 | End: 2022-10-03

## 2022-08-29 NOTE — PROGRESS NOTES
[facility] Internal Medicine  [facility] [address]  [facility], [address]  Dept: [phone]  Dept Fax: [phone]    YOB: 1970    Deborah Riedel is here for follow up of DM -2 ,   Dr. Marco A Parmar has done the surgery   Back on Nov 12, 21 , and completed the antibiotics. Overall the LLE site healed well. He gets around in a wheel chair, A1c 8.2  about 4 months ago,  our machine broke down, so not able to repeat one today. he is off the omnipod and using the humalog and glargine insulin with good results. And he does have dex com. , no recent LOC, no fever or chills. Got his covid booster dose. .  . no recent low sugars, but the dexcom average is 2227  over the last 14 days , only 26% in the range. He lives alone, claims makes his  Own food, recommend cut down the portions, and eat more veggies   And less carb intake. Seen dietician in the past. Had covid Vaccine   And had covid . No LOC or low sugars. Has vertigo , claims he drinks lot of fluids, missed diabetic clinic appt. At times he feels the room is spinning. He claims he had EGD recently by dr. Agatha Fulton had colon in the past. No recent fever or chills. He complains of feeling hot , even in   [facility] room. Patient Active Problem List   Diagnosis    Status post below knee amputation of right lower extremity (HCC)    Gait disturbance    Sebaceous cyst    Uncontrolled type 2 diabetes mellitus with hyperglycemia, with long-term current use of insulin (HCC)    Severe bipolar II disorder, recent episode major depressive, remission (HCC)    Bipolar 1 disorder (HCC)    Leukocytosis    Lactic acidosis    Hyponatremia    Normocytic anemia    Obesity (BMI 30-39. 9)    History of noncompliance with medical treatment    Essential hypertension    Tobacco dependence    Gastroesophageal reflux disease without esophagitis    History of marijuana use    Physical debility    Anemia    Electrolyte imbalance    Type 2 diabetes mellitus with hyperglycemia, with long-term current use of insulin (HCC)    Weakness    Infection of above knee amputation stump of right leg (HCC)    Above knee amputation of right lower extremity (HCC)    Vitamin D deficiency    COPD exacerbation (HCC)    Influenza B    Depression, recurrent (HCC)    Major depressive disorder, recurrent (HCC)    GI bleed    Elevated lipase    Other psychoactive substance abuse, uncomplicated (HCC)    Calculus of gallbladder without cholecystitis without obstruction    Right upper quadrant abdominal pain    Pedal edema    MURALI (obstructive sleep apnea)    Shortness of breath    Hypothyroid    Anxiety and depression    Dyspnea    Sinus tachycardia    Metabolic acidosis    Edema of left lower extremity    SOB (shortness of breath) on exertion    Intermittent palpitations    History of chest pain    Dizziness, nonspecific    Hyperlipidemia LDL goal <70    Neuropathy    Venous stasis ulcer of left lower leg with edema of left lower leg (HCC)    Angina pectoris, unspecified    Atherosclerotic heart disease of native coronary artery with unspecified angina pectoris    Chronic diarrhea       Current Outpatient Medications   Medication Sig Dispense Refill    atorvastatin (LIPITOR) 40 MG tablet Take 1 tablet by mouth nightly 30 tablet 1    insulin lispro (HUMALOG KWIKPEN U-200) 200 UNIT/ML SOPN pen 40 units Plus scale before meals; only takes if BG >200 - Max dose 160 units per day (Patient taking differently: 40 units Plus scale before meals; only takes if BG >200 - Max dose 160 units per day. Patient says he is not doing the scale currently via his choice. Everything he is doing.) 30 pen 2    levothyroxine (SYNTHROID) 50 MCG tablet Take 1 tablet by mouth daily 90 tablet 1    empagliflozin (JARDIANCE) 25 MG tablet TAKE 1 TABLET BY MOUTH ONCE DAILY.  30 tablet 5    gabapentin (NEURONTIN) 400 MG capsule Change to 400 mg TID 90 capsule 1    Insulin Glargine, 2 Unit Dial, 300 UNIT/ML SOPN Change to 85 U in am , 85 U PM 15 pen 1    Dulaglutide (TRULICITY) 4.5 PK/8.6AC SOPN Inject 4.5 mg into the skin once a week *dose increase 4 pen 3    lisinopril (PRINIVIL;ZESTRIL) 10 MG tablet Take 1 tablet by mouth daily 90 tablet 1    nitroGLYCERIN (NITROSTAT) 0.3 MG SL tablet Place 1 tablet under the tongue every 5 minutes as needed for Chest pain up to max of 3 total doses. If no relief after 1 dose, call 911. 30 tablet 3    metoprolol tartrate (LOPRESSOR) 50 MG tablet Take 1 tablet by mouth 2 times daily 90 tablet 3    Insulin Pen Needle (TRUEPLUS PEN NEEDLES) 31G X 8 MM MISC USE AS DIRECTED 200 each 1    sertraline (ZOLOFT) 100 MG tablet Take 100 mg by mouth daily       Continuous Blood Gluc Transmit (DEXCOM G6 TRANSMITTER) MISC 1 Device by Does not apply route every 3 months 1 each 3    Continuous Blood Gluc Sensor (DEXCOM G6 SENSOR) MISC 1 Device by Does not apply route every 14 days 9 each 3    Continuous Blood Gluc  (DEXCOM G6 ) LIANNE 1 Device by Does not apply route 4 times daily (before meals and nightly) 1 Device 0    ARIPiprazole (ABILIFY) 5 MG tablet Take 5 mg by mouth daily       Tirzepatide (MOUNJARO) 5 MG/0.5ML SOPN Inject 5 mg into the skin once a week 2 mL 0     No current facility-administered medications for this visit. Allergies   Allergen Reactions    Pcn [Penicillins] Shortness Of Breath, Nausea And Vomiting and Other (See Comments)     Does not remember reactions. Has TOLERATED amoxicillin and several different cephalosporins. Actos [Pioglitazone] Swelling    Clindamycin/Lincomycin Itching    Vancomycin Hives      Rash, with erythematous plaques to abdomen, shoulders, and proximal upper extremities with pruritis, persisted with 2nd dose administered slower.       Metformin And Related Swelling       Review of Systems      see HPI      /78 (Site: Left Upper Arm)   Pulse 64   Temp 97.8 °F (36.6 °C)   Wt 225 lb (102.1 kg)   BMI 36.32 kg/m²       Physical Exam:    General appearance - alert, well appearing, and in no distress and overweight, in a wheel chair   Mental status - alert and oriented    Neck - supple, no significant adenopathy  Chest - clear to auscultation, no wheezes, rales or rhonchi, symmetric air entry  Heart - normal rate, regular rhythm, normal S1, S2, no murmurs, rubs, clicks or gallops  Abdomen - soft, nontender, nondistended, no masses or organomegaly  no abdominal bruits. Obese Protuberant: No  Neurological - alert, oriented, normal speech, no focal findings or movement disorder noted, motor and sensory grossly normal bilaterally  Musculoskeletal - no joint tenderness, deformity or swelling  RLE AKA   Extremities - peripheral pulses normal, no pedal edema, no clubbing or cyanosis, Eugene's sign negative, RLE  AKA   Prosthesis: No  Skin - normal coloration and turgor,  LLE had an ulcer, completed antibiotics. I have reviewed recent diagnostic testing including labs     Diagnosis Orders   1. Uncontrolled type 2 diabetes mellitus with hyperglycemia, with long-term current use of insulin (HCC)  POCT glycosylated hemoglobin (Hb A1C)      2. Essential hypertension              Plan:    Orders Placed This Encounter   Procedures    POCT glycosylated hemoglobin (Hb A1C)         Outpatient Encounter Medications as of 8/29/2022   Medication Sig Dispense Refill    atorvastatin (LIPITOR) 40 MG tablet Take 1 tablet by mouth nightly 30 tablet 1    insulin lispro (HUMALOG KWIKPEN U-200) 200 UNIT/ML SOPN pen 40 units Plus scale before meals; only takes if BG >200 - Max dose 160 units per day (Patient taking differently: 40 units Plus scale before meals; only takes if BG >200 - Max dose 160 units per day. Patient says he is not doing the scale currently via his choice. Everything he is doing.) 30 pen 2    levothyroxine (SYNTHROID) 50 MCG tablet Take 1 tablet by mouth daily 90 tablet 1    empagliflozin (JARDIANCE) 25 MG tablet TAKE 1 TABLET BY MOUTH ONCE DAILY.  30 tablet 5 gabapentin (NEURONTIN) 400 MG capsule Change to 400 mg TID 90 capsule 1    Insulin Glargine, 2 Unit Dial, 300 UNIT/ML SOPN Change to 85 U in am , 85 U PM 15 pen 1    Dulaglutide (TRULICITY) 4.5 YS/8.5QN SOPN Inject 4.5 mg into the skin once a week *dose increase 4 pen 3    lisinopril (PRINIVIL;ZESTRIL) 10 MG tablet Take 1 tablet by mouth daily 90 tablet 1    nitroGLYCERIN (NITROSTAT) 0.3 MG SL tablet Place 1 tablet under the tongue every 5 minutes as needed for Chest pain up to max of 3 total doses. If no relief after 1 dose, call 911. 30 tablet 3    metoprolol tartrate (LOPRESSOR) 50 MG tablet Take 1 tablet by mouth 2 times daily 90 tablet 3    Insulin Pen Needle (TRUEPLUS PEN NEEDLES) 31G X 8 MM MISC USE AS DIRECTED 200 each 1    sertraline (ZOLOFT) 100 MG tablet Take 100 mg by mouth daily       Continuous Blood Gluc Transmit (DEXCOM G6 TRANSMITTER) MISC 1 Device by Does not apply route every 3 months 1 each 3    Continuous Blood Gluc Sensor (DEXCOM G6 SENSOR) MISC 1 Device by Does not apply route every 14 days 9 each 3    Continuous Blood Gluc  (DEXCOM G6 ) LIANNE 1 Device by Does not apply route 4 times daily (before meals and nightly) 1 Device 0    ARIPiprazole (ABILIFY) 5 MG tablet Take 5 mg by mouth daily       Tirzepatide (MOUNJARO) 5 MG/0.5ML SOPN Inject 5 mg into the skin once a week 2 mL 0     No facility-administered encounter medications on file as of 8/29/2022. Controlled Substances Monitoring:       Diagnosis Orders   1. Uncontrolled type 2 diabetes mellitus with hyperglycemia, with long-term current use of insulin ([facility])  POCT glycosylated hemoglobin (Hb A1C)      2. Essential hypertension            Will schedule follow up appointment in 3- 4 weeks     Plan:      As sugars have increased with todays A1c of 8.2 % , increasing the regimen of insulin and trulicity as outlined below. compliance with diet and life style modification, and meds has been highly promoted.  No change in insulin regimen, follow up closely with our diabetic clinic. Increase basaglar to 90 Units am, and evening current dose of 85 U will Increase  to 90 Units in 10 days providing he does not have hypoglycemia. On max dose of Trulicity  4.5 Q weekly, and titrate as tolerated for optimal sugars. And recommended dietary consult, and follow up with Lars De Luna / Skylar Person in our  Diabetic clinic, in 4 weeks. He missed the last appt. Strong dietary and  life style changes and regular exercise was recommended, and to lead an active life style . Diabetic education material was provided to the patient on this visit. Pt was counseled extensively on both   Diet and exercise regimen today , along with healthy living with diabetes. HTN is stable on the current regimen on  BB and ACE no changes. Pt with diabetic neuropathy - taking 300 mg TID,  And also seeing dr. Rissa Bower , PM/R doc for MRI of the neck per his recommendations . Refer to dr. Milton LYNCH for  diabetic foot care. Vertigo - recommend increase water intake, and take antivert as needed. Once again no hypoglycemic issues. Signed by : Nicole Sullivan M.D.       [address] [facility] Internal Medicine  [address], [address]  [facility], [address]    Tel  [phone]  Fax [phone]

## 2022-09-03 DIAGNOSIS — G62.9 NEUROPATHY: ICD-10-CM

## 2022-09-06 RX ORDER — GABAPENTIN 400 MG/1
CAPSULE ORAL
Qty: 90 CAPSULE | Refills: 1 | OUTPATIENT
Start: 2022-09-06

## 2022-09-07 DIAGNOSIS — G62.9 NEUROPATHY: ICD-10-CM

## 2022-09-07 RX ORDER — GABAPENTIN 400 MG/1
CAPSULE ORAL
Qty: 90 CAPSULE | Refills: 1 | OUTPATIENT
Start: 2022-09-07

## 2022-09-08 DIAGNOSIS — G62.9 NEUROPATHY: ICD-10-CM

## 2022-09-08 RX ORDER — GABAPENTIN 400 MG/1
CAPSULE ORAL
Qty: 90 CAPSULE | Refills: 1 | OUTPATIENT
Start: 2022-09-08

## 2022-09-15 ENCOUNTER — OFFICE VISIT (OUTPATIENT)
Dept: INTERNAL MEDICINE CLINIC | Age: 52
End: 2022-09-15
Payer: MEDICARE

## 2022-09-15 VITALS
TEMPERATURE: 99 F | HEIGHT: 66 IN | DIASTOLIC BLOOD PRESSURE: 68 MMHG | BODY MASS INDEX: 36.32 KG/M2 | HEART RATE: 103 BPM | SYSTOLIC BLOOD PRESSURE: 100 MMHG

## 2022-09-15 DIAGNOSIS — Z79.4 UNCONTROLLED TYPE 2 DIABETES MELLITUS WITH HYPERGLYCEMIA, WITH LONG-TERM CURRENT USE OF INSULIN (HCC): Primary | ICD-10-CM

## 2022-09-15 DIAGNOSIS — E11.65 UNCONTROLLED TYPE 2 DIABETES MELLITUS WITH HYPERGLYCEMIA, WITH LONG-TERM CURRENT USE OF INSULIN (HCC): Primary | ICD-10-CM

## 2022-09-15 LAB — HBA1C MFR BLD: 7.8 %

## 2022-09-15 PROCEDURE — 83036 HEMOGLOBIN GLYCOSYLATED A1C: CPT | Performed by: INTERNAL MEDICINE

## 2022-09-15 PROCEDURE — G0108 DIAB MANAGE TRN  PER INDIV: HCPCS | Performed by: INTERNAL MEDICINE

## 2022-09-15 NOTE — PROGRESS NOTES
[facility]  [facility]. [address], [facility], [address]  [phone] (phone)  [phone] (fax)    Patient ID: Ehsan Dickey 1970  Referring Provider: Dr. Luana Kenny     Patient's name and  were verified. Subjective:    He presents for His follow-up diabetic visit. He has type 2 diabetes mellitus. Home regimen includes: Insulin, GLP-1 Agonist, and SGLT-2 inhibitors He is noncompliant some of the time. Assessment:     Lab Results   Component Value Date/Time    LABA1C 7.8 09/15/2022 09:06 AM    BUN 12 07/15/2022 05:25 AM    CREATININE 0.7 07/15/2022 05:25 AM     Vitals:    09/15/22 0951 09/15/22 0952   BP: (!) 90/53 100/68   Site: Right Upper Arm Right Upper Arm   Position: Sitting Sitting   Pulse: (!) 103    Temp: 99 °F (37.2 °C)    Height: 5' 6\" (1.676 m)      Wt Readings from Last 3 Encounters:   22 225 lb (102.1 kg)   22 225 lb (102.1 kg)   22 225 lb 4.8 oz (102.2 kg)     Ht Readings from Last 3 Encounters:   09/15/22 5' 6\" (1.676 m)   06/15/22 5' 6\" (1.676 m)   22 5' 6\" (1.676 m)       Diabetes Pharmacotherapy:  Trulciity 4.5mg weekly; Wednesday  Jardiance 25mg daily  Glargine 85 units morning and night (doesn't take morning dose if BS is under 200)  Humalog 40 units with meals; does not use the scale             -take 40 units if BS is elevated without a meal    Glucose Trends:   Glucose at FBS today resulted at 230mg/dl  Current monitoring regimen: Dexcom CGM - checks 4+ times daily  Home blood sugar trends:    -V. High: 52%, High: 35%, Target: 13%, Low: 0%, V. Low: 0%   -glucose levels vary significantly. Insulin is not being taken as directed. Any episodes of hypoglycemia?  yes - 2-3 times per week   -Treats with orange    Lifestyle Factors:   Previous visit with dietician: yes - 21  Current diet: B: skips            L: steak/ shrimp/ noodles [facility]                       Snacks:                       Beverages:  Current exercise: weights at home--daily     Health Maintenance:  Eye exam current (within one year): yes Date: 6/2/22, Dr. Lesley Krishnamurthy  Any history of foot problems? yes. Hx of AKA right leg. Foot exam up to date: yes Date: 6/7/22, Dr. Kelby Ferguson up to date:    Pneumonia: yes   COVID-19: yes  The ASCVD Risk score (Sofía Kim., et al., 2013) failed to calculate for the following reasons: The patient has a prior MI or stroke diagnosis   Last panel - LDL:   Lab Results   Component Value Date    LDLCALC 92 10/29/2021    LDLDIRECT 102.12 02/26/2021     Taking ASA:  No   Taking statin: Yes - atorvastatin  Last microalbumin/creatinine ratio: No results found for: MCACR, MALBCR, MALBCRRATEXT   Taking ACE/ARB: Yes- lisinopril  Depression screening completed. 1/18/2022    Focus:   Diabetes Education. Last A1C: 7.8% today. Angelique presents today with glucose levels quite variable; more variable than previous download. He reports not taking his morning Glargine when FBS is under 200--this impacts his blood sugar for the next 24 hours. He is also not following the Humalog scale provided; dosing 40 units without any scale. He will dose 40 units \"3-4 times per day\"; if he eats a meal or if his blood sugar is over 250. There is no pattern to the timing of these doses. Initially Angelique was receptive to information discussed, but discussion about insulin doses began, Angelique became very irritated and resistant. He states its a bad day and 'didn't want to be here in the first place'. He states he will not use the scale for Humalog. He does agree to at least take 1/2 of his AM Glargine dose when FBS is under 200 and her agrees to decrease Humalog to 30 units for a correction without food (40 units often making him low). I am  not sure how to help this gentleman if he does not want our help. He is very resistant to changing how he is currently taking his insulin.     Curriculum Area Focus: Glucose checks/goals, Carbohydrate counting/meal planning, Hypoglycemia management, Hyperglycemia management, and Pattern management  DSME PLAN:     When your blood sugar waking up is under 200            --instead of skipping your Glargine--try taking 1/2 of the dose  When your blood sugar is high and you're not eating and want to          Take a correction:  instead of taking 40 units Humalog,                           --try only 30 units Humalog  Good good with your Dexcom use and taking your medications! Awesome work with your new exercise routine!!     Goals Addressed                   This Visit's Progress     Check the blood sguar 4x per day   On track     Reduce carbohydrate intake to 180-200gms per day   On track     Self Monitoring   On track     Self-Monitored Blood Glucose - I will check my blood sugar blood sugars ac  I will notify my provider of any trends of increasing or decreasing blood sugars over a 1 month period. I will notify my provider if I have any blood sugar readings less than 70 more than 2 times a month. Call in BS readings every 2 wks. Stop by office and bring by glucometer for download either before or after wound clinic appt on 1/8. Bring meter to all appts. None Recently Recorded    Barriers: lack of motivation and overwhelmed by complexity of regimen  Plan for overcoming my barriers: education and support from [facility], PCP. Education on importance of monitoring. Educated on when to check. Confidence: 6/10  Anticipated Goal Completion Date: 2/7/20         Take insulin as directed/ follow guidelines   Not on track             Meter download, medications, PMH and nursing assessment reviewed with Estella Cai states He is willing to participate in this plan of care and verbalized understanding of all instructions provided. Teach back used to verify comprehension. Follow-up: 2 months    Total time involved in direct patient education: 60 minutes.

## 2022-09-15 NOTE — PATIENT INSTRUCTIONS
When your blood sugar waking up is under 200            --instead of skipping your Glargine--try taking 1/2 of the dose  When your blood sugar is high and you're not eating and want to          Take a correction:  instead of taking 40 units Humalog,                           --try only 30 units Humalog  Good good with your Dexcom use and taking your medications!   Awesome work with your new exercise routine!!

## 2022-09-20 RX ORDER — FUROSEMIDE 20 MG/1
TABLET ORAL
Qty: 30 TABLET | Refills: 1 | Status: SHIPPED | OUTPATIENT
Start: 2022-09-20 | End: 2022-11-03 | Stop reason: SDUPTHER

## 2022-10-01 DIAGNOSIS — E03.9 HYPOTHYROIDISM, UNSPECIFIED TYPE: ICD-10-CM

## 2022-10-03 RX ORDER — METOPROLOL TARTRATE 50 MG/1
50 TABLET, FILM COATED ORAL 2 TIMES DAILY
Qty: 90 TABLET | Refills: 3 | Status: SHIPPED | OUTPATIENT
Start: 2022-10-03

## 2022-10-03 RX ORDER — DULAGLUTIDE 4.5 MG/.5ML
INJECTION, SOLUTION SUBCUTANEOUS
Qty: 2 ML | Refills: 3 | Status: SHIPPED | OUTPATIENT
Start: 2022-10-03

## 2022-10-03 RX ORDER — ATORVASTATIN CALCIUM 40 MG/1
TABLET, FILM COATED ORAL
Qty: 30 TABLET | Refills: 1 | Status: SHIPPED | OUTPATIENT
Start: 2022-10-03

## 2022-10-03 RX ORDER — LEVOTHYROXINE SODIUM 0.05 MG/1
TABLET ORAL
Qty: 90 TABLET | Refills: 1 | Status: SHIPPED | OUTPATIENT
Start: 2022-10-03

## 2022-10-07 ENCOUNTER — HOSPITAL ENCOUNTER (OUTPATIENT)
Dept: INTERVENTIONAL RADIOLOGY/VASCULAR | Age: 52
Discharge: HOME | End: 2022-10-07
Payer: MEDICARE

## 2022-10-07 DIAGNOSIS — E11.42 DIABETIC POLYNEUROPATHY ASSOCIATED WITH TYPE 2 DIABETES MELLITUS (HCC): ICD-10-CM

## 2022-10-07 DIAGNOSIS — I73.9 PAD (PERIPHERAL ARTERY DISEASE) (HCC): ICD-10-CM

## 2022-10-07 DIAGNOSIS — Z89.422 HISTORY OF AMPUTATION OF LESSER TOE OF LEFT FOOT (HCC): ICD-10-CM

## 2022-10-07 DIAGNOSIS — L85.3 XEROSIS OF SKIN: ICD-10-CM

## 2022-10-07 DIAGNOSIS — R26.81 GAIT INSTABILITY: ICD-10-CM

## 2022-10-07 DIAGNOSIS — Z89.611 RIGHT ABOVE-KNEE AMPUTEE (HCC): ICD-10-CM

## 2022-10-07 PROCEDURE — 93923 UPR/LXTR ART STDY 3+ LVLS: CPT

## 2022-10-21 ENCOUNTER — HOSPITAL ENCOUNTER (EMERGENCY)
Age: 52
Discharge: HOME | End: 2022-10-21
Attending: EMERGENCY MEDICINE
Payer: MEDICARE

## 2022-10-21 ENCOUNTER — TELEPHONE (OUTPATIENT)
Dept: INTERNAL MEDICINE CLINIC | Age: 52
End: 2022-10-21

## 2022-10-21 VITALS
SYSTOLIC BLOOD PRESSURE: 165 MMHG | DIASTOLIC BLOOD PRESSURE: 80 MMHG | TEMPERATURE: 97.5 F | OXYGEN SATURATION: 100 % | HEART RATE: 99 BPM | RESPIRATION RATE: 18 BRPM

## 2022-10-21 DIAGNOSIS — E66.9 OBESITY, UNSPECIFIED CLASSIFICATION, UNSPECIFIED OBESITY TYPE, UNSPECIFIED WHETHER SERIOUS COMORBIDITY PRESENT: Primary | ICD-10-CM

## 2022-10-21 DIAGNOSIS — Z78.9 DECREASED ACTIVITIES OF DAILY LIVING (ADL): ICD-10-CM

## 2022-10-21 DIAGNOSIS — G47.9 SLEEP DISTURBANCES: ICD-10-CM

## 2022-10-21 DIAGNOSIS — E66.9 OBESITY (BMI 30-39.9): ICD-10-CM

## 2022-10-21 DIAGNOSIS — H61.23 BILATERAL IMPACTED CERUMEN: Primary | ICD-10-CM

## 2022-10-21 PROCEDURE — 99283 EMERGENCY DEPT VISIT LOW MDM: CPT

## 2022-10-21 ASSESSMENT — ENCOUNTER SYMPTOMS
ABDOMINAL PAIN: 0
VOMITING: 0
SHORTNESS OF BREATH: 1
SINUS PRESSURE: 0
NAUSEA: 0
RHINORRHEA: 0
SINUS PAIN: 0
SORE THROAT: 0

## 2022-10-21 ASSESSMENT — PAIN SCALES - GENERAL: PAINLEVEL_OUTOF10: 5

## 2022-10-21 ASSESSMENT — PAIN - FUNCTIONAL ASSESSMENT: PAIN_FUNCTIONAL_ASSESSMENT: 0-10

## 2022-10-21 ASSESSMENT — PAIN DESCRIPTION - PAIN TYPE: TYPE: ACUTE PAIN

## 2022-10-21 NOTE — ED PROVIDER NOTES
[place] ENCOUNTER          Pt Name: Enrique Rodriguez  MRN: [id number]  [place] 1970  Date of evaluation: 10/21/2022  Treating Resident Physician: Madelyn Sigala DO  Supervising Physician: Dr. Feng Mccarthy    History obtained from the patient. CHIEF COMPLAINT       Chief Complaint   Patient presents with    [address] ILLNESS    HPI  Enrique Rodriguez is a 45 y.o. male who presents to the emergency department for evaluation of right ear pain and decreased hearing. Last night began noticing decreased hearing out of the right ear, states it feels muffled like his ear is covered. Has not noticed anything draining out of the right ear. Denies any problems like this before. Began experiencing some pain in the right ear last night, rates as a 5 or 6/10, describes as an aching pain. Does use Q tips, used one this morning and got some ear wax out but states this made the symptoms worse. The patient has no other acute complaints at this time. REVIEW OF SYSTEMS   Review of Systems   Constitutional:  Negative for chills and fever. HENT:  Positive for ear pain (right side) and hearing loss (right side). Negative for congestion, ear discharge, rhinorrhea, sinus pressure, sinus pain, sneezing and sore throat. Eyes:  Negative for visual disturbance. Respiratory:  Positive for shortness of breath (chronic). Cardiovascular:  Negative for chest pain. Gastrointestinal:  Negative for abdominal pain, nausea and vomiting. Neurological:  Negative for dizziness and light-headedness.        PAST MEDICAL AND SURGICAL HISTORY     Past Medical History:   Diagnosis Date    Atherosclerotic heart disease of native coronary artery with unspecified angina pectoris 1/18/2022    Bipolar 2 disorder (HCC)     previously followed with Dr. Kira Martinez and Kathy Figueroa in [facility]    BPH (benign prostatic hyperplasia)     COPD (chronic obstructive pulmonary disease) (HCC)     Diabetes mellitus type 2, uncontrolled     HgbA1c on 4/2/2019 was 9.1.     Diabetic peripheral neuropathy (HCC)     Diabetic polyneuropathy ([address]     Diabetic ulcer of right foot associated with type 2 diabetes mellitus ([address] 12/10/2015    Essential hypertension     \"never been on b/p medication that I know of\"    GERD (gastroesophageal reflux disease)     Hammer toe of left foot     Heart murmur     denies any chest pain or palpitations    History of tobacco abuse     Hx of AKA (above knee amputation), right ([address] 04/18/2019    Dr. Claudine Lane    Hyperlipidemia     Macular edema, diabetic, bilateral ([address] 05/04/2018    Dr. Cheyenne Baker referred to retina specialist for 2nd opinion    Marijuana abuse in remission     Melena     MRSA (methicillin resistant staph aureus) culture positive     Onychomycosis     Other disorders of kidney and ureter in diseases classified elsewhere     Sleep apnea     no cpap    Thyroid disease     WD-Skin ulcer of fourth toe of right foot with necrosis of bone ([address] 6/29/2016     Past Surgical History:   Procedure Laterality Date    ABDOMEN SURGERY      ABSCESS DRAINAGE Right     foot    AMPUTATION ABOVE KNEE Right 4/18/2019    RIGHT ABOVE KNEE AMPUTATION performed by Len James MD at [facility] LAPAROSCOPIC N/A 4/1/2020    ROBOTIC CHOLECYSTECTOMY performed by Silvano Arvizu MD at [facility] N/A 7/20/2020    CYSTOSCOPY performed by Devin tSokes MD at [address] [place] [address] 8/21/2020    CYSTOSCOPY, UROLIFT performed by Devin Stokes MD at [facility], COLON, [facility] Right 07/01/2016    I & D    GASTROCNEMIUS RECESSION Left 11/12/2021    LEFT LEG GASTROCS RECESSION performed by Jakob Johnson MD at [address] Right 2/18/2019    I&D RIGHT STUMP performed by Len James MD at [place] [facility] LEG AMPUTATION BELOW KNEE Right 07/20/2016    LEG AMPUTATION BELOW KNEE Right 4/4/2019    I&D AND REVISION OF AMPUTATION RIGHT LEG performed by Ivis Trevino MD at [facility] Right 1/14/15    sole of foot I&D    RI DRAIN INFECT SHOULDER BURSA Left 8/18/2017    LEFT SHOULDER INCISION AND DRAINAGE performed by Ivis Trevino MD at [address]  [place] OFFICE/OUTPT [facility] Right 9/20/2018    EXCISIONAL DEBRIDEMENT RIGHT BKA STUMP performed [facility]uki Acu[facility] Right 1/16/15    2nd toe with wound vac applied    UPPER GASTROINTESTINAL ENDOSCOPY N/A 3/3/2020    EGD DILATION SAVORY [facility] N/A 6/15/2022    EGD DILATION BALLOON performed by Naty Menednez MD at [facility]  ? when         MEDICATIONS   No current facility-administered medications for this encounter. Current Outpatient Medications:     neomycin-polymyxin-hydrocortisone (CORTISPORIN) 3.5-79031-4 otic solution, Place 4 drops into the right ear 3 times daily for 7 days Instill into right Ear, Disp: 10 mL, Rfl: 0    insulin lispro (HUMALOG KWIKPEN U-200) 200 UNIT/ML SOPN pen, 40 units Plus scale before meals; only takes if BG >200 - Max dose 160 units per day, Disp: 30 Adjustable Dose Pre-filled Pen Syringe, Rfl: 3    atorvastatin (LIPITOR) 40 MG tablet, TAKE ONE TABLET BY MOUTH EVERY NIGHT, Disp: 30 tablet, Rfl: 1    metoprolol tartrate (LOPRESSOR) 50 MG tablet, TAKE 1 TABLET BY MOUTH 2 TIMES DAILY. , Disp: 90 tablet, Rfl: 3    TRULICITY 4.5 TZ/3.6YO SOPN, INJECT 4.5 MG INTO THE SKIN ONCE A WEEK ., Disp: 2 mL, Rfl: 3    levothyroxine (SYNTHROID) 50 MCG tablet, TAKE 1 TABLET BY MOUTH ONCE DAILY. , Disp: 90 tablet, Rfl: 1    furosemide (LASIX) 20 MG tablet, Take one daily x 2 weeks then as needed for weight gain, Disp: 30 tablet, Rfl: 1    Multiple Vitamins-Minerals (EYE VITAMINS PO), Take 1 tablet by mouth daily, Disp: , Rfl:     Omega-3 Fatty Acids (FISH OIL PO), Take 1 caplet by mouth daily, Disp: , Rfl:     Probiotic Product (PROBIOTIC PO), Take 1 tablet by mouth daily, Disp: , Rfl:     lisinopril (PRINIVIL;ZESTRIL) 10 MG tablet, Take 1 tablet by mouth daily, Disp: 90 tablet, Rfl: 1    Continuous Blood Gluc Sensor (DEXCOM G6 SENSOR) MISC, 1 Device by Does not apply route every 14 days, Disp: 9 each, Rfl: 3    Continuous Blood Gluc Transmit (DEXCOM G6 TRANSMITTER) MISC, 1 Device by Does not apply route every 3 months, Disp: 1 each, Rfl: 3    empagliflozin (JARDIANCE) 25 MG tablet, TAKE 1 TABLET BY MOUTH ONCE DAILY. , Disp: 30 tablet, Rfl: 5    gabapentin (NEURONTIN) 400 MG capsule, Change to 400 mg TID, Disp: 90 capsule, Rfl: 1    Insulin Glargine, 2 Unit Dial, 300 UNIT/ML SOPN, Change to 85 U in am , 85 U PM, Disp: 15 pen, Rfl: 1    nitroGLYCERIN (NITROSTAT) 0.3 MG SL tablet, Place 1 tablet under the tongue every 5 minutes as needed for Chest pain up to max of 3 total doses.  If no relief after 1 dose, call 911., Disp: 30 tablet, Rfl: 3    Insulin Pen Needle (TRUEPLUS PEN NEEDLES) 31G X 8 MM MISC, USE AS DIRECTED, Disp: 200 each, Rfl: 1    sertraline (ZOLOFT) 100 MG tablet, Take 100 mg by mouth daily , Disp: , Rfl:     Continuous Blood Gluc  (DEXCOM G6 ) LIANNE, 1 Device by Does not apply route 4 times daily (before meals and nightly), Disp: 1 Device, Rfl: 0    ARIPiprazole (ABILIFY) 5 MG tablet, Take 5 mg by mouth daily , Disp: , Rfl:       SOCIAL HISTORY     Social History     Social History Narrative    Not on file     Social History     Tobacco Use    Smoking status: Former     Packs/day: 0.00     Years: 10.00     Pack years: 0.00     Types: Cigars, Cigarettes     Start date: 1985     Quit date:      Years since quittin.1    Smokeless tobacco: Never   Vaping Use    Vaping Use: Former    Substances: Nicotine, Flavoring   Substance Use Topics    Alcohol use: Not Currently Alcohol/week: 0.0 standard drinks    Drug use: No     Comment: 30 YEARS AGO         ALLERGIES     Allergies   Allergen Reactions    Pcn [Penicillins] Shortness Of Breath, Nausea And Vomiting and Other (See Comments)     Does not remember reactions. Has TOLERATED amoxicillin and several different cephalosporins. Actos [Pioglitazone] Swelling    Clindamycin/Lincomycin Itching    Vancomycin Hives      Rash, with erythematous plaques to abdomen, shoulders, and proximal upper extremities with pruritis, persisted with 2nd dose administered slower. Metformin And Related Swelling         FAMILY HISTORY     Family History   Problem Relation Age of Onset    Diabetes Mother     Other Mother         pneumonia, H1N1    Depression Mother     Early Death Mother     High Blood Pressure Mother     High Cholesterol Mother     Vision Loss Maternal Grandmother     Arthritis Maternal Grandfather     Heart Disease Maternal Grandfather          PREVIOUS RECORDS   Previous records reviewed:  Admission on 7/14/22 for furhter workup of n/v/d and was admitted for treatment of suspected c. Diff but was unable to have a bowel movement for further studies. PHYSICAL EXAM     ED Triage Vitals   BP Temp Temp Source Heart Rate Resp SpO2 Height Weight   10/21/22 1024 10/21/22 1023 10/21/22 1023 10/21/22 1023 10/21/22 1023 10/21/22 1023 -- --   (!) 165/80 97.5 °F (36.4 °C) Oral 99 18 100 %       Initial vital signs and nursing assessment reviewed and  blood pressure of 165/80 noted but has not taken antihypertensive medications yet today . There is no height or weight on file to calculate BMI. Pulsoximetry is normal per my interpretation. Additional Vital Signs:  Vitals:    10/21/22 1024   BP: (!) 165/80   Pulse:    Resp:    Temp:    SpO2:        Physical Exam  Constitutional:       Appearance: Normal appearance. HENT:      Head: Normocephalic and atraumatic. Right Ear: There is impacted cerumen. Left Ear:  There is impacted cerumen. Ears:      Comments: Dried earwax noted on external ears bilaterally. Irritation of the right auditory canal noted on the inferior aspect of the canal and just superficial to the area of impacted cerumen, minimal bright red blood noted     Mouth/Throat:      Mouth: Mucous membranes are moist.   Cardiovascular:      Rate and Rhythm: Normal rate and regular rhythm. Heart sounds: Normal heart sounds. No murmur heard. No gallop. Pulmonary:      Effort: Pulmonary effort is normal. No respiratory distress. Breath sounds: Normal breath sounds. No wheezing. Skin:     General: Skin is warm. Capillary Refill: Capillary refill takes less than 2 seconds. Neurological:      General: No focal deficit present. Mental Status: He is alert and oriented to person, place, and time. MEDICAL DECISION MAKING   Initial Assessment: This is a 46 y/o male presenting with complaints of right sided ear pain and hearing loss beginning last night. Physical exam did reveal bilateral impacted cerumen, right worse than left. Plan:   Flushing and curette removal of bilateral cerumen        ED RESULTS   Laboratory results:  Labs Reviewed - No data to display    Radiologic studies results:  No orders to display       ED Medications administered this visit: Medications - No data to display      ED COURSE     ED Course as of 10/21/22 1143   Fri Oct 21, 2022   1141 Impacted cerumen removed from bilateral ears with Tms visualized following procedure and no signs of otitis media. [AM]      ED Course User Index  [AM] Madelyn Sigala DO     Ears were flushed bilaterally with 50% hydrogen peroxide and 50% warm water solution. More superficial and visible cerumen was removed with curette. Patient tolerated procedure well and cerumen was removed from bilateral auditory canals with visualization of the bilateral tympanic membranes following procedure.  The right auditory canal was noted to be irritated with minimal inferior bleeding noted (blood was noted at this position of auditory canal prior to procedure, secondary to irritation with Q-tip at home), so patient was prescribed cortisporin for a week. Patient counseled on preventative measures, including discontinuing Q-Tip usage and okay to use debrox OTC if beginning to have difficulty with cerumen. Noted improvement of hearing following cerumen removal. At this time, stable for discharge. Strict return precautions and follow up instructions were discussed with the patient prior to discharge, with which the patient agrees. MEDICATION CHANGES     New Prescriptions    NEOMYCIN-POLYMYXIN-HYDROCORTISONE (CORTISPORIN) 3.5-32691-3 OTIC SOLUTION    Place 4 drops into the right ear 3 times daily for 7 days Instill into right Ear         FINAL DISPOSITION     Final diagnoses:   Bilateral impacted cerumen     Condition: condition: stable  Dispo: Discharge to home      This transcription was electronically signed. Parts of this transcriptions may have been dictated by use of voice recognition software and electronically transcribed, and parts may have been transcribed with the assistance of an ED scribe. The transcription may contain errors not detected in proofreading. Please refer to my supervising physician's documentation if my documentation differs. Electronically Signed:  Roman Berry DO, 10/21/22, 11:43 AM        Roman Berry DO  Resident  10/21/22 0911

## 2022-10-21 NOTE — ED PROVIDER NOTES
PATIENT NAME: Kerry Guerra  MRN: [id number]  : 1970  ROCHE: 10/21/2022    I performed a history and physical examination of the patient and discussed management with the Resident. I reviewed the Resident's note and agree with the documented findings and plan of care. Any areas of disagreement are noted on the chart. I was personally present for the key portions of any procedures and have documented in the chart those procedures where I was not present during the key portions. I have reviewed the emergency nurses triage note and agree with the chief complaint, past medical history, past surgical history, allergies, medications, social and family history as documented unless otherwise noted below. MEDICAL DEDISION MAKING (MDM)     Kerry Guerra is a 45 y.o. male who presents to Emergency Department with Otalgia     Right ear pain and decreased hearing since last night. Physical exam reveals bilateral ear cerumen impaction and this was cleared in ED. Right right EC duration of bleeding, this will be treated with Cortisporin. Discharged with PCP follow-up in 1 week. Vitals:    10/21/22 1023 10/21/22 1024   BP:  (!) 165/80   Pulse: 99    Resp: 18    Temp: 97.5 °F (36.4 °C)    TempSrc: Oral    SpO2: 100%      Medications - No data to display  Labs Reviewed - No data to display  No orders to display       FINAL IMPRESSION AND DISPOSITION      1.  Bilateral impacted cerumen        DISPOSITION Decision To Discharge 10/21/2022 11:33:10 AM      PATIENT REFERRED TO:  Shante Mendoza MD  [facility] [id number] [phone]    In 1 week  If symptoms worsen      DISCHARGE MEDICATIONS:  New Prescriptions    NEOMYCIN-POLYMYXIN-HYDROCORTISONE (CORTISPORIN) 3.5-83691-9 OTIC SOLUTION    Place 4 drops into the right ear 3 times daily for 7 days Instill into right Ear       (Please note that portions of this note were completed with a voice recognition program.  Efforts were made to edit the dictations but occasionally words aremis-transcribed.)    MD Brandy Larsen MD  10/21/22 3106 Yes

## 2022-10-21 NOTE — TELEPHONE ENCOUNTER
Patient called to inform me that he is in the hospital because of not being able to hear out of right ear. However, he has another problem. Patient states he has a history of Sleep Apnea, but he thinks it progressed into something worse. He said he wakes up dozens of times through out the night feeling like he is waking up after being dead. He feels his heart racing and that he is actually having his heart stop while asleep and he wakes up after. I informed him I would speak to  about this because of the strange episodes he is having. Patient said he appreciates out concern for him and says he is in no hurry, but does want to speak to  if possible.

## 2022-10-30 PROBLEM — R10.30 LOWER ABDOMINAL PAIN: Status: ACTIVE | Noted: 2022-10-30

## 2022-11-03 RX ORDER — FUROSEMIDE 20 MG/1
TABLET ORAL
Qty: 30 TABLET | Refills: 0 | Status: SHIPPED | OUTPATIENT
Start: 2022-11-03 | End: 2022-11-30

## 2022-11-04 ENCOUNTER — TELEPHONE (OUTPATIENT)
Dept: INTERNAL MEDICINE CLINIC | Age: 52
End: 2022-11-04

## 2022-11-04 NOTE — TELEPHONE ENCOUNTER
Patient called to explain that he is having trouble swallowing food and is sweating uncontrollably. He says he just sits with the fan blowing on him and he stills sweats. He says he can do nothing and still sweats a lot. Says his shirt is always damp and he is just sitting. He does not know why he is sweating so much. He then states he is having trouble swallowing food, but uses water to help swallowing. He wants to know what to do. I informed him to eat swallow and take small bites to help with not choking when eating. I then told him to keep drinking water and to keep watch of how he feels. He will go to the hospital if things worsen. However, he wants  to give advice.

## 2022-11-08 DIAGNOSIS — R13.10 DYSPHAGIA, UNSPECIFIED TYPE: Primary | ICD-10-CM

## 2022-11-08 DIAGNOSIS — R68.89 TEMPERATURE INTOLERANCE: Primary | ICD-10-CM

## 2022-11-08 DIAGNOSIS — E66.9 OBESITY (BMI 30-39.9): ICD-10-CM

## 2022-11-08 NOTE — PROGRESS NOTES
Will order swallowing eval as out patient due to dysphagia, based on his call. And also check TSH and T4 levels due to his other complain. MBS has been ordered.           Electronically signed by Roland Cam MD on 11/8/2022 at 8:34 AM

## 2022-11-08 NOTE — PROGRESS NOTES
See orders , for MBS, and TSH , T4       Electronically signed by John Maravilla MD on 11/8/2022 at 8:45 AM

## 2022-11-09 NOTE — ED NOTES
MEDICAL SCREENING:    Reason for Visit: right flank pain and dysuria. Hx of nephrolithiasis. Left sided nephrectomy last month at .    Patient initially seen in triage.  The patient was advised further evaluation and diagnostic testing will be needed, some of the treatment and testing will be initiated in the lobby in order to begin the process.  The patient will be returned to the waiting area for the time being and possibly be re-assessed by a subsequent ED provider.  The patient will be brought back to the treatment area in as timely manner as possible.         Arjun Alvares PA-C  11/09/22 1596     ELIZABETH Alfredo at bedside to reassess and update on POC.       Rodolfo Olsen RN  04/12/19 3892

## 2022-11-10 ENCOUNTER — HOSPITAL ENCOUNTER (OUTPATIENT)
Age: 52
Discharge: HOME | End: 2022-11-10
Payer: MEDICARE

## 2022-11-10 DIAGNOSIS — E66.9 OBESITY (BMI 30-39.9): ICD-10-CM

## 2022-11-10 DIAGNOSIS — R68.89 TEMPERATURE INTOLERANCE: ICD-10-CM

## 2022-11-10 LAB
T4 FREE: 1.27 NG/DL (ref 0.93–1.76)
TSH SERPL DL<=0.05 MIU/L-ACNC: 2.32 UIU/ML (ref 0.4–4.2)

## 2022-11-10 PROCEDURE — 36415 COLL VENOUS BLD VENIPUNCTURE: CPT

## 2022-11-10 PROCEDURE — 84439 ASSAY OF FREE THYROXINE: CPT

## 2022-11-10 PROCEDURE — 84443 ASSAY THYROID STIM HORMONE: CPT

## 2022-11-15 RX ORDER — ATORVASTATIN CALCIUM 40 MG/1
TABLET, FILM COATED ORAL
Qty: 30 TABLET | Refills: 1 | OUTPATIENT
Start: 2022-11-15

## 2022-11-15 RX ORDER — FUROSEMIDE 20 MG/1
TABLET ORAL
Qty: 30 TABLET | Refills: 0 | OUTPATIENT
Start: 2022-11-15

## 2022-11-16 ENCOUNTER — PHARMACY VISIT (OUTPATIENT)
Dept: INTERNAL MEDICINE CLINIC | Age: 52
End: 2022-11-16
Payer: MEDICARE

## 2022-11-16 DIAGNOSIS — Z79.4 TYPE 2 DIABETES MELLITUS WITH HYPERGLYCEMIA, WITH LONG-TERM CURRENT USE OF INSULIN (HCC): Primary | ICD-10-CM

## 2022-11-16 DIAGNOSIS — E11.65 TYPE 2 DIABETES MELLITUS WITH HYPERGLYCEMIA, WITH LONG-TERM CURRENT USE OF INSULIN (HCC): Primary | ICD-10-CM

## 2022-11-16 PROCEDURE — G0108 DIAB MANAGE TRN  PER INDIV: HCPCS | Performed by: INTERNAL MEDICINE

## 2022-11-16 NOTE — PATIENT INSTRUCTIONS
Keep taking Toujeo 85 units two times daily; if your blood sugar is less than 200, consider taking a half dose of insulin    2.  For higher carb meals (above 100g carbs), consider taking more Humalog - 60 units instead of 40 units    Talk to Dr. Mateo Dove about a reflux medication    Blood Sugar Goals:  -Aim to keep blood sugars between 80 and 180

## 2022-11-16 NOTE — PROGRESS NOTES
[facility]  [facility] [address] [address] [address], [facility], [address]  [phone] (phone)  [phone] (fax)    Patient ID: Jenny Robertson 1970  Referring Provider: Dr. Graceann Krabbe     Patient's name and  were verified. Subjective:    He presents for His follow-up diabetic visit. He has type 2 diabetes mellitus. Home regimen includes: Insulin, GLP-1 Agonist, and SGLT-2 inhibitors He is noncompliant much of the time. Assessment:     Lab Results   Component Value Date/Time    LABA1C 7.8 09/15/2022 09:06 AM    BUN 12 07/15/2022 05:25 AM    CREATININE 0.7 07/15/2022 05:25 AM     Vitals:    22 1616 22 1626   BP: (!) 151/78 (!) 148/68   Pulse: 99    Temp: 97.6 °F (36.4 °C)      Wt Readings from Last 3 Encounters:   22 225 lb (102.1 kg)   22 225 lb (102.1 kg)   22 225 lb 4.8 oz (102.2 kg)     Ht Readings from Last 3 Encounters:   09/15/22 5' 6\" (1.676 m)   06/15/22 5' 6\" (1.676 m)   22 5' 6\" (1.676 m)       Diabetes Pharmacotherapy:  Jardiance 25 mg daily  Toujeo 85 units BID - holds insulin if BG is less than 200   -rarely takes morning Toujeo  Humalog 40 units before meals   -Unable to verbalize medication dose   -Does not use correction scale  Trulicity 5.0OE weekly      Glucose Trends:   GCurrent monitoring regimen: Dexcom CGM - checks 4+ times daily  Home blood sugar trends:    -V. High: 27%, High: 35%, Target: 37%, Low: <1%, V. Low: 0%   -Poor meal clearance for most meals, suspect some missed doses of Humalog  before meals   -Frequently corrects hyperglycemia with high Humalog correction boluses  Any episodes of hypoglycemia?  Weekly or less    Lifestyle Factors:   Previous visit with dietician: yes - 2021  Current diet: B: bowl of cereal (160g carbs, per patient)            L: frozen burrito (40g carbs?) OR 2 chx parm patties & m. potatoes                       D: 2 chx parm patties                       Beverages: water, diet Pepsi  Current exercise: lifting for several hours every day    Health Maintenance:  Eye exam current (within one year): yes Date: 6/2022, Dr. Samuel Dumont  Any history of foot problems? yes - hx AKA right leg  Foot exam up to date: yes Date: 6/2022, Dr. Jefferson Han; following with Dr. Christine Solorzano up to date:    Pneumonia: yes   Influenza: no  The ASCVD Risk score (Humaira DK, et al., 2019) failed to calculate for the following reasons: The patient has a prior MI or stroke diagnosis   Last panel - LDL:   Lab Results   Component Value Date    LDLCALC 92 10/29/2021    LDLDIRECT 102.12 02/26/2021   Taking ASA:  No   Taking statin: Yes - atorvastatin  Last microalbumin/creatinine ratio:  repeat ordered  Taking ACE/ARB: Yes- lisinopril    Focus:   Diabetes Education. Last A1C: 7.8% (9/15/22). Of note, Jamie's CGM BG was 84 mg/dL during today's appointment; Augusto Sever repeatedly refused treatment or a fingerstick recheck; BG remained steady throughout the appointment. CGM time in range is below target at 37%. He reports that he's happy with his current level of glucose control and will not make any changes. We discussed that Jamie's sugars are above goal a majority of the time; this is causing damage in his body and increasing Jamie's risk for further complications. Augusto Sever reports that he's not concerned about his blood sugars as long as his A1c is less than 8%. We discussed that his current blood sugars are on track for an A1c above 8% at next check (GMI 8.4%); he remains resistant to change. We attempted to discuss Jamie's diet; he reports that he is carb counting, but is unable to accurately verbalize carb counts of recent meals. It does appear that some of Jamie's meals are very high in CHO, while others are low in CHO. Discussed the concept of a carb ratio to replace fixed Humalog doses, but Augusto Sever is unwilling because he \"hates math\".   Discussed that it is important to either be consistent with carb intake or adjust the Humalog dose depending on the CHO content of the meal. Shad Holldiay will consider. Discussed the purpose of a correction scale; Shad Holliday verbalized that he thinks the scales are too weak; he determines how much Humalog to give as a correction (as high as Humalog 50 units at times). Encouraged Shad Holliday to continue to avoid regular sodas and stick with his exercise regimen. Although goals were set today to improve how Shad Holliday takes his insulin; he states that he is unlikely to make the adjustments. Curriculum Area Focus: Diabetes complications, Glucose checks/goals, and Carbohydrate counting/meal planning  DSME PLAN:     Keep taking Toujeo 85 units two times daily; if your blood sugar is less than 200, consider taking at least a half dose of insulin    2. For higher carb meals (above 100g carbs), consider taking more Humalog - 60 units instead of 40 units    Blood Sugar Goals:  -Aim to keep blood sugars between 80 and 180     Goals Addressed                   This Visit's Progress     Check the blood sguar 4x per day   On track     Reduce carbohydrate intake to 180-200gms per day   Not on track     Take insulin as directed/ follow guidelines   Not on track             Meter download, medications, PMH and nursing assessment reviewed. Katrin Supa states He is willing to participate in this plan of care and verbalized understanding of all instructions provided. Teach back used to verify comprehension. Follow-up: 1 month Dr. Penny Story, 3 month DM Clinic RN    Total time involved in direct patient education: 60 minutes.      For Pharmacy Admin Tracking Only    Time Spent (min): [address], [place], [facility]  Internal Medicine Clinical Pharmacist  [phone]

## 2022-11-17 VITALS — DIASTOLIC BLOOD PRESSURE: 68 MMHG | HEART RATE: 99 BPM | TEMPERATURE: 97.6 F | SYSTOLIC BLOOD PRESSURE: 148 MMHG

## 2022-11-23 ENCOUNTER — HOSPITAL ENCOUNTER (INPATIENT)
Age: 52
LOS: 3 days | Discharge: HOME | DRG: 246 | End: 2022-11-26
Attending: EMERGENCY MEDICINE | Admitting: INTERNAL MEDICINE
Payer: MEDICARE

## 2022-11-23 ENCOUNTER — APPOINTMENT (OUTPATIENT)
Dept: CT IMAGING | Age: 52
DRG: 246 | End: 2022-11-23
Payer: MEDICARE

## 2022-11-23 ENCOUNTER — APPOINTMENT (OUTPATIENT)
Dept: GENERAL RADIOLOGY | Age: 52
DRG: 246 | End: 2022-11-23
Payer: MEDICARE

## 2022-11-23 DIAGNOSIS — U07.1 COVID-19 VIRUS INFECTION: Primary | ICD-10-CM

## 2022-11-23 DIAGNOSIS — R77.8 TROPONIN LEVEL ELEVATED: ICD-10-CM

## 2022-11-23 DIAGNOSIS — R07.1 CHEST PAIN ON BREATHING: ICD-10-CM

## 2022-11-23 PROBLEM — R07.9 CHEST PAIN ON EXERTION: Status: ACTIVE | Noted: 2022-11-23

## 2022-11-23 PROBLEM — S78.112A ABOVE KNEE AMPUTATION OF LEFT LOWER EXTREMITY (HCC): Status: ACTIVE | Noted: 2022-11-23

## 2022-11-23 LAB
ALBUMIN SERPL-MCNC: 4 G/DL (ref 3.5–5.1)
ALP BLD-CCNC: 79 U/L (ref 38–126)
ALT SERPL-CCNC: 30 U/L (ref 11–66)
ANION GAP SERPL CALCULATED.3IONS-SCNC: 13 MEQ/L (ref 8–16)
ANION GAP SERPL CALCULATED.3IONS-SCNC: 15 MEQ/L (ref 8–16)
APTT: 33.2 SECONDS (ref 22–38)
AST SERPL-CCNC: 43 U/L (ref 5–40)
BASOPHILS # BLD: 0.6 %
BASOPHILS ABSOLUTE: 0 THOU/MM3 (ref 0–0.1)
BILIRUB SERPL-MCNC: 0.5 MG/DL (ref 0.3–1.2)
BUN BLDV-MCNC: 12 MG/DL (ref 7–22)
BUN BLDV-MCNC: 13 MG/DL (ref 7–22)
C-REACTIVE PROTEIN: 5.97 MG/DL (ref 0–1)
CALCIUM SERPL-MCNC: 8.9 MG/DL (ref 8.5–10.5)
CALCIUM SERPL-MCNC: 9.3 MG/DL (ref 8.5–10.5)
CHLORIDE BLD-SCNC: 95 MEQ/L (ref 98–111)
CHLORIDE BLD-SCNC: 97 MEQ/L (ref 98–111)
CO2: 25 MEQ/L (ref 23–33)
CO2: 26 MEQ/L (ref 23–33)
CREAT SERPL-MCNC: 1 MG/DL (ref 0.4–1.2)
CREAT SERPL-MCNC: 1 MG/DL (ref 0.4–1.2)
D-DIMER QUANTITATIVE: 1229 NG/ML FEU (ref 0–500)
EKG ATRIAL RATE: 106 BPM
EKG P AXIS: 52 DEGREES
EKG P-R INTERVAL: 188 MS
EKG Q-T INTERVAL: 342 MS
EKG QRS DURATION: 76 MS
EKG QTC CALCULATION (BAZETT): 454 MS
EKG R AXIS: 20 DEGREES
EKG T AXIS: 48 DEGREES
EKG VENTRICULAR RATE: 106 BPM
EOSINOPHIL # BLD: 2.8 %
EOSINOPHILS ABSOLUTE: 0.2 THOU/MM3 (ref 0–0.4)
ERYTHROCYTE [DISTWIDTH] IN BLOOD BY AUTOMATED COUNT: 14.2 % (ref 11.5–14.5)
ERYTHROCYTE [DISTWIDTH] IN BLOOD BY AUTOMATED COUNT: 47.3 FL (ref 35–45)
FERRITIN: 546 NG/ML (ref 22–322)
GFR SERPL CREATININE-BSD FRML MDRD: > 60 ML/MIN/1.73M2
GFR SERPL CREATININE-BSD FRML MDRD: > 60 ML/MIN/1.73M2
GLUCOSE BLD-MCNC: 133 MG/DL (ref 70–108)
GLUCOSE BLD-MCNC: 180 MG/DL (ref 70–108)
GLUCOSE BLD-MCNC: 238 MG/DL (ref 70–108)
GLUCOSE BLD-MCNC: 270 MG/DL (ref 70–108)
GLUCOSE BLD-MCNC: 517 MG/DL (ref 70–108)
HCT VFR BLD CALC: 45.3 % (ref 42–52)
HEMOGLOBIN: 15.9 GM/DL (ref 14–18)
HEPARIN UNFRACTIONATED: 0.12 U/ML (ref 0.3–0.7)
HEPARIN UNFRACTIONATED: 0.15 U/ML (ref 0.3–0.7)
IMMATURE GRANS (ABS): 0.03 THOU/MM3 (ref 0–0.07)
IMMATURE GRANULOCYTES: 0.4 %
INFLUENZA A: NOT DETECTED
INFLUENZA B: NOT DETECTED
INR BLD: 1.04 (ref 0.85–1.13)
LACTIC ACID: 2.2 MMOL/L (ref 0.5–2)
LACTIC ACID: 3.8 MMOL/L (ref 0.5–2)
LD: 228 U/L (ref 100–190)
LIPASE: 50.6 U/L (ref 5.6–51.3)
LYMPHOCYTES # BLD: 22.2 %
LYMPHOCYTES ABSOLUTE: 1.6 THOU/MM3 (ref 1–4.8)
MAGNESIUM: 2.2 MG/DL (ref 1.6–2.4)
MCH RBC QN AUTO: 32 PG (ref 26–33)
MCHC RBC AUTO-ENTMCNC: 35.1 GM/DL (ref 32.2–35.5)
MCV RBC AUTO: 91.1 FL (ref 80–94)
MONOCYTES # BLD: 14.8 %
MONOCYTES ABSOLUTE: 1.1 THOU/MM3 (ref 0.4–1.3)
NUCLEATED RED BLOOD CELLS: 0 /100 WBC
PHOSPHORUS: 2.6 MG/DL (ref 2.4–4.7)
PLATELET # BLD: 189 THOU/MM3 (ref 130–400)
PMV BLD AUTO: 9.7 FL (ref 9.4–12.4)
POTASSIUM REFLEX MAGNESIUM: 4 MEQ/L (ref 3.5–5.2)
POTASSIUM SERPL-SCNC: 4.1 MEQ/L (ref 3.5–5.2)
RBC # BLD: 4.97 MILL/MM3 (ref 4.7–6.1)
SARS-COV-2 RNA, RT PCR: DETECTED
SEDIMENTATION RATE, ERYTHROCYTE: 52 MM/HR (ref 0–10)
SEG NEUTROPHILS: 59.2 %
SEGMENTED NEUTROPHILS ABSOLUTE COUNT: 4.3 THOU/MM3 (ref 1.8–7.7)
SODIUM BLD-SCNC: 135 MEQ/L (ref 135–145)
SODIUM BLD-SCNC: 136 MEQ/L (ref 135–145)
TOTAL PROTEIN: 7.9 G/DL (ref 6.1–8)
TROPONIN T: 0.01 NG/ML
TROPONIN T: 0.01 NG/ML
TROPONIN T: 0.02 NG/ML
TROPONIN T: 0.03 NG/ML
TSH SERPL DL<=0.05 MIU/L-ACNC: 2.48 UIU/ML (ref 0.4–4.2)
VITAMIN D 25-HYDROXY: 17 NG/ML (ref 30–100)
WBC # BLD: 7.2 THOU/MM3 (ref 4.8–10.8)

## 2022-11-23 PROCEDURE — 6360000004 HC RX CONTRAST MEDICATION

## 2022-11-23 PROCEDURE — 71275 CT ANGIOGRAPHY CHEST: CPT

## 2022-11-23 PROCEDURE — 2580000003 HC RX 258: Performed by: PHYSICIAN ASSISTANT

## 2022-11-23 PROCEDURE — 85610 PROTHROMBIN TIME: CPT

## 2022-11-23 PROCEDURE — 1200000003 HC TELEMETRY R&B

## 2022-11-23 PROCEDURE — 99285 EMERGENCY DEPT VISIT HI MDM: CPT

## 2022-11-23 PROCEDURE — 84100 ASSAY OF PHOSPHORUS: CPT

## 2022-11-23 PROCEDURE — 85025 COMPLETE CBC W/AUTO DIFF WBC: CPT

## 2022-11-23 PROCEDURE — 82948 REAGENT STRIP/BLOOD GLUCOSE: CPT

## 2022-11-23 PROCEDURE — 87636 SARSCOV2 & INF A&B AMP PRB: CPT

## 2022-11-23 PROCEDURE — 85520 HEPARIN ASSAY: CPT

## 2022-11-23 PROCEDURE — 6370000000 HC RX 637 (ALT 250 FOR IP)

## 2022-11-23 PROCEDURE — 87507 IADNA-DNA/RNA PROBE TQ 12-25: CPT

## 2022-11-23 PROCEDURE — 83690 ASSAY OF LIPASE: CPT

## 2022-11-23 PROCEDURE — 1200000000 HC SEMI PRIVATE

## 2022-11-23 PROCEDURE — 2580000003 HC RX 258

## 2022-11-23 PROCEDURE — 83615 LACTATE (LD) (LDH) ENZYME: CPT

## 2022-11-23 PROCEDURE — 80053 COMPREHEN METABOLIC PANEL: CPT

## 2022-11-23 PROCEDURE — 84443 ASSAY THYROID STIM HORMONE: CPT

## 2022-11-23 PROCEDURE — 93010 ELECTROCARDIOGRAM REPORT: CPT | Performed by: INTERNAL MEDICINE

## 2022-11-23 PROCEDURE — 6360000002 HC RX W HCPCS

## 2022-11-23 PROCEDURE — 86140 C-REACTIVE PROTEIN: CPT

## 2022-11-23 PROCEDURE — 83735 ASSAY OF MAGNESIUM: CPT

## 2022-11-23 PROCEDURE — 84484 ASSAY OF TROPONIN QUANT: CPT

## 2022-11-23 PROCEDURE — 99223 1ST HOSP IP/OBS HIGH 75: CPT | Performed by: PHYSICIAN ASSISTANT

## 2022-11-23 PROCEDURE — 71046 X-RAY EXAM CHEST 2 VIEWS: CPT

## 2022-11-23 PROCEDURE — 85651 RBC SED RATE NONAUTOMATED: CPT

## 2022-11-23 PROCEDURE — 83605 ASSAY OF LACTIC ACID: CPT

## 2022-11-23 PROCEDURE — 93005 ELECTROCARDIOGRAM TRACING: CPT | Performed by: PHYSICIAN ASSISTANT

## 2022-11-23 PROCEDURE — 82306 VITAMIN D 25 HYDROXY: CPT

## 2022-11-23 PROCEDURE — 6370000000 HC RX 637 (ALT 250 FOR IP): Performed by: PHYSICIAN ASSISTANT

## 2022-11-23 PROCEDURE — 82728 ASSAY OF FERRITIN: CPT

## 2022-11-23 PROCEDURE — 36415 COLL VENOUS BLD VENIPUNCTURE: CPT

## 2022-11-23 PROCEDURE — 85379 FIBRIN DEGRADATION QUANT: CPT

## 2022-11-23 PROCEDURE — 93005 ELECTROCARDIOGRAM TRACING: CPT

## 2022-11-23 PROCEDURE — 85730 THROMBOPLASTIN TIME PARTIAL: CPT

## 2022-11-23 RX ORDER — MAGNESIUM SULFATE IN WATER 40 MG/ML
2000 INJECTION, SOLUTION INTRAVENOUS PRN
Status: DISCONTINUED | OUTPATIENT
Start: 2022-11-23 | End: 2022-11-26 | Stop reason: HOSPADM

## 2022-11-23 RX ORDER — SODIUM CHLORIDE, SODIUM LACTATE, POTASSIUM CHLORIDE, CALCIUM CHLORIDE 600; 310; 30; 20 MG/100ML; MG/100ML; MG/100ML; MG/100ML
INJECTION, SOLUTION INTRAVENOUS CONTINUOUS
Status: DISCONTINUED | OUTPATIENT
Start: 2022-11-23 | End: 2022-11-26 | Stop reason: HOSPADM

## 2022-11-23 RX ORDER — INSULIN LISPRO 100 [IU]/ML
0-4 INJECTION, SOLUTION INTRAVENOUS; SUBCUTANEOUS
Status: DISCONTINUED | OUTPATIENT
Start: 2022-11-23 | End: 2022-11-23

## 2022-11-23 RX ORDER — HEPARIN SODIUM 1000 [USP'U]/ML
4000 INJECTION, SOLUTION INTRAVENOUS; SUBCUTANEOUS ONCE
Status: COMPLETED | OUTPATIENT
Start: 2022-11-23 | End: 2022-11-23

## 2022-11-23 RX ORDER — NITROGLYCERIN 0.4 MG/1
0.4 TABLET SUBLINGUAL EVERY 5 MIN PRN
Status: DISCONTINUED | OUTPATIENT
Start: 2022-11-23 | End: 2022-11-26 | Stop reason: HOSPADM

## 2022-11-23 RX ORDER — INSULIN LISPRO 100 [IU]/ML
20 INJECTION, SOLUTION INTRAVENOUS; SUBCUTANEOUS
Status: DISCONTINUED | OUTPATIENT
Start: 2022-11-23 | End: 2022-11-26 | Stop reason: HOSPADM

## 2022-11-23 RX ORDER — INSULIN LISPRO 100 [IU]/ML
0-8 INJECTION, SOLUTION INTRAVENOUS; SUBCUTANEOUS
Status: DISCONTINUED | OUTPATIENT
Start: 2022-11-23 | End: 2022-11-26 | Stop reason: HOSPADM

## 2022-11-23 RX ORDER — SERTRALINE HYDROCHLORIDE 100 MG/1
100 TABLET, FILM COATED ORAL DAILY
Status: DISCONTINUED | OUTPATIENT
Start: 2022-11-23 | End: 2022-11-26 | Stop reason: HOSPADM

## 2022-11-23 RX ORDER — ATORVASTATIN CALCIUM 80 MG/1
80 TABLET, FILM COATED ORAL NIGHTLY
Status: DISCONTINUED | OUTPATIENT
Start: 2022-11-23 | End: 2022-11-26 | Stop reason: HOSPADM

## 2022-11-23 RX ORDER — SODIUM CHLORIDE 0.9 % (FLUSH) 0.9 %
10 SYRINGE (ML) INJECTION PRN
Status: DISCONTINUED | OUTPATIENT
Start: 2022-11-23 | End: 2022-11-26 | Stop reason: HOSPADM

## 2022-11-23 RX ORDER — METOPROLOL TARTRATE 50 MG/1
50 TABLET, FILM COATED ORAL 2 TIMES DAILY
Status: DISCONTINUED | OUTPATIENT
Start: 2022-11-23 | End: 2022-11-26 | Stop reason: HOSPADM

## 2022-11-23 RX ORDER — ASPIRIN 81 MG/1
324 TABLET ORAL DAILY
Status: DISCONTINUED | OUTPATIENT
Start: 2022-11-23 | End: 2022-11-25 | Stop reason: DRUGHIGH

## 2022-11-23 RX ORDER — HEPARIN SODIUM 10000 [USP'U]/100ML
5-30 INJECTION, SOLUTION INTRAVENOUS CONTINUOUS
Status: DISCONTINUED | OUTPATIENT
Start: 2022-11-23 | End: 2022-11-26 | Stop reason: HOSPADM

## 2022-11-23 RX ORDER — ONDANSETRON 4 MG/1
4 TABLET, ORALLY DISINTEGRATING ORAL EVERY 8 HOURS PRN
Status: DISCONTINUED | OUTPATIENT
Start: 2022-11-23 | End: 2022-11-26 | Stop reason: HOSPADM

## 2022-11-23 RX ORDER — HEPARIN SODIUM 1000 [USP'U]/ML
2000 INJECTION, SOLUTION INTRAVENOUS; SUBCUTANEOUS PRN
Status: DISCONTINUED | OUTPATIENT
Start: 2022-11-23 | End: 2022-11-26 | Stop reason: HOSPADM

## 2022-11-23 RX ORDER — SODIUM CHLORIDE 9 MG/ML
INJECTION, SOLUTION INTRAVENOUS PRN
Status: DISCONTINUED | OUTPATIENT
Start: 2022-11-23 | End: 2022-11-26 | Stop reason: HOSPADM

## 2022-11-23 RX ORDER — INSULIN GLARGINE 100 [IU]/ML
50 INJECTION, SOLUTION SUBCUTANEOUS NIGHTLY
Status: DISCONTINUED | OUTPATIENT
Start: 2022-11-23 | End: 2022-11-23

## 2022-11-23 RX ORDER — ACETAMINOPHEN 325 MG/1
650 TABLET ORAL EVERY 6 HOURS PRN
Status: DISCONTINUED | OUTPATIENT
Start: 2022-11-23 | End: 2022-11-25 | Stop reason: SDUPTHER

## 2022-11-23 RX ORDER — VITAMIN B COMPLEX
1000 TABLET ORAL DAILY
Status: DISCONTINUED | OUTPATIENT
Start: 2022-11-23 | End: 2022-11-26 | Stop reason: HOSPADM

## 2022-11-23 RX ORDER — INSULIN LISPRO 100 [IU]/ML
0-4 INJECTION, SOLUTION INTRAVENOUS; SUBCUTANEOUS NIGHTLY
Status: DISCONTINUED | OUTPATIENT
Start: 2022-11-23 | End: 2022-11-23

## 2022-11-23 RX ORDER — POLYETHYLENE GLYCOL 3350 17 G/17G
17 POWDER, FOR SOLUTION ORAL DAILY PRN
Status: DISCONTINUED | OUTPATIENT
Start: 2022-11-23 | End: 2022-11-26 | Stop reason: HOSPADM

## 2022-11-23 RX ORDER — LISINOPRIL 10 MG/1
10 TABLET ORAL DAILY
Status: DISCONTINUED | OUTPATIENT
Start: 2022-11-23 | End: 2022-11-26 | Stop reason: HOSPADM

## 2022-11-23 RX ORDER — ASCORBIC ACID 500 MG
500 TABLET ORAL DAILY
Status: DISCONTINUED | OUTPATIENT
Start: 2022-11-23 | End: 2022-11-26 | Stop reason: HOSPADM

## 2022-11-23 RX ORDER — ACETAMINOPHEN 650 MG/1
650 SUPPOSITORY RECTAL EVERY 6 HOURS PRN
Status: DISCONTINUED | OUTPATIENT
Start: 2022-11-23 | End: 2022-11-26 | Stop reason: HOSPADM

## 2022-11-23 RX ORDER — HEPARIN SODIUM 1000 [USP'U]/ML
4000 INJECTION, SOLUTION INTRAVENOUS; SUBCUTANEOUS PRN
Status: DISCONTINUED | OUTPATIENT
Start: 2022-11-23 | End: 2022-11-26 | Stop reason: HOSPADM

## 2022-11-23 RX ORDER — INSULIN LISPRO 100 [IU]/ML
0-4 INJECTION, SOLUTION INTRAVENOUS; SUBCUTANEOUS NIGHTLY
Status: DISCONTINUED | OUTPATIENT
Start: 2022-11-23 | End: 2022-11-26 | Stop reason: HOSPADM

## 2022-11-23 RX ORDER — 0.9 % SODIUM CHLORIDE 0.9 %
500 INTRAVENOUS SOLUTION INTRAVENOUS ONCE
Status: COMPLETED | OUTPATIENT
Start: 2022-11-23 | End: 2022-11-24

## 2022-11-23 RX ORDER — POTASSIUM CHLORIDE 20 MEQ/1
40 TABLET, EXTENDED RELEASE ORAL PRN
Status: DISCONTINUED | OUTPATIENT
Start: 2022-11-23 | End: 2022-11-26 | Stop reason: HOSPADM

## 2022-11-23 RX ORDER — GABAPENTIN 300 MG/1
300 CAPSULE ORAL 3 TIMES DAILY
Status: DISCONTINUED | OUTPATIENT
Start: 2022-11-23 | End: 2022-11-26 | Stop reason: HOSPADM

## 2022-11-23 RX ORDER — ONDANSETRON 2 MG/ML
4 INJECTION INTRAMUSCULAR; INTRAVENOUS EVERY 6 HOURS PRN
Status: DISCONTINUED | OUTPATIENT
Start: 2022-11-23 | End: 2022-11-26 | Stop reason: HOSPADM

## 2022-11-23 RX ORDER — INSULIN GLARGINE 100 [IU]/ML
50 INJECTION, SOLUTION SUBCUTANEOUS NIGHTLY
Status: DISCONTINUED | OUTPATIENT
Start: 2022-11-23 | End: 2022-11-26 | Stop reason: HOSPADM

## 2022-11-23 RX ORDER — BENZONATATE 100 MG/1
200 CAPSULE ORAL 3 TIMES DAILY PRN
Status: DISCONTINUED | OUTPATIENT
Start: 2022-11-23 | End: 2022-11-26 | Stop reason: HOSPADM

## 2022-11-23 RX ORDER — LEVOTHYROXINE SODIUM 0.05 MG/1
50 TABLET ORAL DAILY
Status: DISCONTINUED | OUTPATIENT
Start: 2022-11-23 | End: 2022-11-26 | Stop reason: HOSPADM

## 2022-11-23 RX ORDER — DEXTROSE MONOHYDRATE 100 MG/ML
INJECTION, SOLUTION INTRAVENOUS CONTINUOUS PRN
Status: DISCONTINUED | OUTPATIENT
Start: 2022-11-23 | End: 2022-11-26 | Stop reason: HOSPADM

## 2022-11-23 RX ORDER — ARIPIPRAZOLE 5 MG/1
5 TABLET ORAL DAILY
Status: DISCONTINUED | OUTPATIENT
Start: 2022-11-23 | End: 2022-11-26 | Stop reason: HOSPADM

## 2022-11-23 RX ORDER — ZINC SULFATE 50(220)MG
50 CAPSULE ORAL DAILY
Status: DISCONTINUED | OUTPATIENT
Start: 2022-11-23 | End: 2022-11-26 | Stop reason: HOSPADM

## 2022-11-23 RX ORDER — SODIUM CHLORIDE 0.9 % (FLUSH) 0.9 %
10 SYRINGE (ML) INJECTION EVERY 12 HOURS SCHEDULED
Status: DISCONTINUED | OUTPATIENT
Start: 2022-11-23 | End: 2022-11-26 | Stop reason: HOSPADM

## 2022-11-23 RX ORDER — POTASSIUM CHLORIDE 7.45 MG/ML
10 INJECTION INTRAVENOUS PRN
Status: DISCONTINUED | OUTPATIENT
Start: 2022-11-23 | End: 2022-11-26 | Stop reason: HOSPADM

## 2022-11-23 RX ADMIN — INSULIN LISPRO 20 UNITS: 100 INJECTION, SOLUTION INTRAVENOUS; SUBCUTANEOUS at 14:20

## 2022-11-23 RX ADMIN — ASPIRIN 324 MG: 81 TABLET, COATED ORAL at 11:30

## 2022-11-23 RX ADMIN — ARIPIPRAZOLE 5 MG: 5 TABLET ORAL at 14:19

## 2022-11-23 RX ADMIN — GABAPENTIN 300 MG: 300 CAPSULE ORAL at 14:19

## 2022-11-23 RX ADMIN — INSULIN LISPRO 4 UNITS: 100 INJECTION, SOLUTION INTRAVENOUS; SUBCUTANEOUS at 14:20

## 2022-11-23 RX ADMIN — HEPARIN SODIUM 4000 UNITS: 1000 INJECTION, SOLUTION INTRAVENOUS; SUBCUTANEOUS at 11:16

## 2022-11-23 RX ADMIN — HEPARIN SODIUM 2000 UNITS: 1000 INJECTION, SOLUTION INTRAVENOUS; SUBCUTANEOUS at 19:43

## 2022-11-23 RX ADMIN — SODIUM CHLORIDE, POTASSIUM CHLORIDE, SODIUM LACTATE AND CALCIUM CHLORIDE: 600; 310; 30; 20 INJECTION, SOLUTION INTRAVENOUS at 14:20

## 2022-11-23 RX ADMIN — SODIUM CHLORIDE, POTASSIUM CHLORIDE, SODIUM LACTATE AND CALCIUM CHLORIDE: 600; 310; 30; 20 INJECTION, SOLUTION INTRAVENOUS at 23:55

## 2022-11-23 RX ADMIN — ONDANSETRON 4 MG: 4 TABLET, ORALLY DISINTEGRATING ORAL at 20:18

## 2022-11-23 RX ADMIN — SERTRALINE 100 MG: 100 TABLET, FILM COATED ORAL at 14:19

## 2022-11-23 RX ADMIN — OXYCODONE HYDROCHLORIDE AND ACETAMINOPHEN 500 MG: 500 TABLET ORAL at 14:20

## 2022-11-23 RX ADMIN — ACETAMINOPHEN 650 MG: 325 TABLET ORAL at 17:58

## 2022-11-23 RX ADMIN — INSULIN GLARGINE 50 UNITS: 100 INJECTION, SOLUTION SUBCUTANEOUS at 17:53

## 2022-11-23 RX ADMIN — METOPROLOL TARTRATE 50 MG: 50 TABLET, FILM COATED ORAL at 14:19

## 2022-11-23 RX ADMIN — GABAPENTIN 300 MG: 300 CAPSULE ORAL at 21:57

## 2022-11-23 RX ADMIN — METOPROLOL TARTRATE 50 MG: 50 TABLET, FILM COATED ORAL at 21:57

## 2022-11-23 RX ADMIN — IOPAMIDOL 80 ML: 755 INJECTION, SOLUTION INTRAVENOUS at 11:03

## 2022-11-23 RX ADMIN — INSULIN LISPRO 2 UNITS: 100 INJECTION, SOLUTION INTRAVENOUS; SUBCUTANEOUS at 17:52

## 2022-11-23 RX ADMIN — INSULIN LISPRO 20 UNITS: 100 INJECTION, SOLUTION INTRAVENOUS; SUBCUTANEOUS at 17:52

## 2022-11-23 RX ADMIN — LEVOTHYROXINE SODIUM 50 MCG: 0.05 TABLET ORAL at 14:19

## 2022-11-23 RX ADMIN — SODIUM CHLORIDE 500 ML: 9 INJECTION, SOLUTION INTRAVENOUS at 21:29

## 2022-11-23 RX ADMIN — ATORVASTATIN CALCIUM 80 MG: 80 TABLET, FILM COATED ORAL at 20:18

## 2022-11-23 RX ADMIN — Medication 50 MG: at 14:20

## 2022-11-23 RX ADMIN — HEPARIN SODIUM 9 UNITS/KG/HR: 10000 INJECTION, SOLUTION INTRAVENOUS at 11:15

## 2022-11-23 RX ADMIN — Medication: at 21:49

## 2022-11-23 RX ADMIN — Medication 1000 UNITS: at 14:20

## 2022-11-23 RX ADMIN — LISINOPRIL 10 MG: 10 TABLET ORAL at 14:20

## 2022-11-23 RX ADMIN — HEPARIN SODIUM 11 UNITS/KG/HR: 10000 INJECTION, SOLUTION INTRAVENOUS at 19:48

## 2022-11-23 ASSESSMENT — HEART SCORE
ECG: 0
ECG: 0

## 2022-11-23 ASSESSMENT — PAIN DESCRIPTION - ORIENTATION: ORIENTATION: MID;UPPER

## 2022-11-23 ASSESSMENT — PAIN SCALES - GENERAL
PAINLEVEL_OUTOF10: 0
PAINLEVEL_OUTOF10: 8
PAINLEVEL_OUTOF10: 7
PAINLEVEL_OUTOF10: 7
PAINLEVEL_OUTOF10: 6

## 2022-11-23 ASSESSMENT — PAIN DESCRIPTION - LOCATION
LOCATION: ABDOMEN
LOCATION: CHEST
LOCATION: CHEST

## 2022-11-23 ASSESSMENT — PAIN DESCRIPTION - DESCRIPTORS
DESCRIPTORS: ACHING
DESCRIPTORS: DISCOMFORT

## 2022-11-23 ASSESSMENT — PAIN DESCRIPTION - PAIN TYPE
TYPE: ACUTE PAIN
TYPE: ACUTE PAIN

## 2022-11-23 ASSESSMENT — PAIN DESCRIPTION - ONSET
ONSET: SUDDEN
ONSET: ON-GOING

## 2022-11-23 ASSESSMENT — PAIN DESCRIPTION - FREQUENCY
FREQUENCY: CONTINUOUS
FREQUENCY: INTERMITTENT

## 2022-11-23 ASSESSMENT — PAIN - FUNCTIONAL ASSESSMENT
PAIN_FUNCTIONAL_ASSESSMENT: ACTIVITIES ARE NOT PREVENTED
PAIN_FUNCTIONAL_ASSESSMENT: 0-10
PAIN_FUNCTIONAL_ASSESSMENT: 0-10

## 2022-11-23 NOTE — ED NOTES
RN updated patient on POC. Pt respirations are even and unlabored.       Kwaku Pérez RN  11/23/22 6084

## 2022-11-23 NOTE — H&P
Hospitalist History and Physical          Patient: Halie Kramer  : 1970  MRN: [id number]     Acct: [de-identified]    PCP: Chapito Ricardo MD  Date of Admission: 2022  Date of Service: Pt seen/examined on 22  and Admitted to Inpatient with expected LOS greater than two midnights due to medical therapy.            Assessment and Plan:      Chest pain concern for unstable angina   Heartscore of 5  Chest has typical and atypical features- left arm radiation, pleuritic character   Troponin elevated 0.028- repeat 0.015 - repeat   Possible  elevation from COVID19   EKG no ischemic or significant changes noted   Heparin initiated   Echo ordered   PRN nitro, BNP control   Repeat EKG, telemetry monitoring   Statin to 80 mg   Cardiology consult pending troponin results    COVID19 infection   CTA chest no infiltrates noted, negative for PE  ESR and CRP elevated, 52 and 5.78   Ferritin, lactic acid and LDH elevated, 546, 2.2, and 228   Supportive care- vitamins, cough suppressants, incentive spirometry     Lactic acidosis   from COVID19 infection  Initiate IVF LR and repeat in four hours     Type II diabetes with diabetic neuropathy   Held home medications- jardiance and trulicity   40 units humalog before meals- start 20 units titrate up   Lantus 85 units daily- start with 50 units   Low correction scale, hypoglycemia protocol  Inpatient BS goal 140 -200     Chronic CHFpEF  Echo 60% Ef  on 9/15/20 - grade 1 diastolic dysfunction  Repeat echo pending  Daily weights, I and O's   Continue metoprolol and lisinopril     History of AKA   Right leg above knee amputation noted     Hypothyroidism   Synthroid 50 mcg     Hyperlipidemia   Statin increased to 80 mg      COPD mentioned in history   PFT's in  indicate criteria not met for diagnosis        =======================================================================      Chief Complaint:  Chest pain     History Of Present Illness:  Halie Kramer is a 45 y.o. male with PMHx of IIDM2 with neuropathy, primary hypertension, hypothyroidism who presents to [facility] with chest pain. Patient states his chest pain started Saturday night 11/19 and has been constant since. He states the chest pain is around his sternum and sharp, reproducible with palpation, coughing and deep breathing. He also endorses dull aching into his left shoulder with numbness going down his left arm. He is unable to identifying any alleviating factors. He did not try any nitro and does not typically use it. He had trouble discerning if his chest pain worsened by physical exertion or by moving his upper body/chest area. It appears to be the latter. He states he has had nausea and vomiting since the onset of his pain. He vomited twice on Saturday once each day since then as well as loose stools. diaarhea. He denies hematemesis, melena or sara bloody stools. He did not endorse any hemoptysis during my history. ED course:   CTA chest, EKG     Past Medical History:        Diagnosis Date    Atherosclerotic heart disease of native coronary artery with unspecified angina pectoris 1/18/2022    Bipolar 2 disorder (HCC)     previously followed with Dr. Meena Cowan and Renae Carter in [place]    BPH (benign prostatic hyperplasia)     COPD (chronic obstructive pulmonary disease) ([facility]     Diabetes mellitus type 2, uncontrolled     HgbA1c on 4/2/2019 was 9.1.     Diabetic peripheral neuropathy (HCC)     Diabetic polyneuropathy ([address]     Diabetic ulcer of right foot associated with type 2 diabetes mellitus ([address] 12/10/2015    Essential hypertension     \"never been on b/p medication that I know of\"    GERD (gastroesophageal reflux disease)     Hammer toe of left foot     Heart murmur     denies any chest pain or palpitations    History of tobacco abuse     Hx of AKA (above knee amputation), right ([address] 04/18/2019    Dr. Tami Viveros    Hyperlipidemia     Macular edema, diabetic, bilateral ([facility] 05/04/2018    Dr. Darrel Franco referred to retina specialist for 2nd opinion    Marijuana abuse in remission     Melena     MRSA (methicillin resistant staph aureus) culture positive     Onychomycosis     Other disorders of kidney and ureter in diseases classified elsewhere     Sleep apnea     no cpap    Thyroid disease     WD-Skin ulcer of fourth toe of right foot with necrosis of bone ([facility] [address] 6/29/2016       Past Surgical History:        Procedure Laterality Date    ABDOMEN SURGERY      ABSCESS DRAINAGE Right     foot    AMPUTATION ABOVE KNEE Right 4/18/2019    RIGHT ABOVE KNEE AMPUTATION performed by Yesenia Schilling MD at [facility], LAPAROSCOPIC N/A 4/1/2020    ROBOTIC CHOLECYSTECTOMY performed by Kari Bass MD at [facility] N/A 7/20/2020    CYSTOSCOPY performed by Frankie Ibarra MD at [address] [place] [address] 8/21/2020    CYSTOSCOPY, UROLIFT performed by Frankie Ibarra MD at [facility], ESOPHAGUS      ENDOSCOPY, COLON, DIAGNOSTIC      FOOT DEBRIDEMENT Right 07/01/2016    I & D    GASTROCNEMIUS RECESSION Left 11/12/2021    LEFT LEG GASTROCS RECESSION performed by Cathy Ceron MD at [facility] Right 2/18/2019    I&D RIGHT STUMP performed by Yesenia Schilling MD at [facility] Right 07/20/2016    LEG AMPUTATION BELOW KNEE Right 4/4/2019    I&D AND REVISION OF AMPUTATION RIGHT LEG performed by Yesenia Schilling MD at [facility] Right 1/14/15    sole of foot I&D    ME DRAIN INFECT SHOULDER BURSA Left 8/18/2017    LEFT SHOULDER INCISION AND DRAINAGE performed by Yesenia Schilling MD at [facility] OFFICE/OUTPT [facility] Right 9/20/2018    EXCISIONAL DEBRIDEMENT RIGHT BKA STUMP performed by Jermaine Solis MD at [facility] Right 1/16/15    2nd toe with wound vac applied    UPPER GASTROINTESTINAL ENDOSCOPY N/A 3/3/2020    EGD DILATION SAVORY performed by Joan Ellsworth MD at [address] N/A 6/15/2022    EGD DILATION BALLOON performed by Jordana Gresham MD at [address] [place] [address]  ? when       Medications Prior to Admission:   Prior to Admission medications    Medication Sig Start Date End Date Taking? Authorizing Provider   furosemide (LASIX) 20 MG tablet Take one daily x 2 weeks then as needed for weight gain 11/3/22   Phuc Ortega MD   insulin lispro (HUMALOG KWIKPEN U-200) 200 UNIT/ML SOPN pen 40 units Plus scale before meals; only takes if BG >200 - Max dose 160 units per day 10/10/22   Kalin Morley MD   atorvastatin (LIPITOR) 40 MG tablet TAKE ONE TABLET BY MOUTH EVERY NIGHT 10/3/22   Phuc Ortega MD   metoprolol tartrate (LOPRESSOR) 50 MG tablet TAKE 1 TABLET BY MOUTH 2 TIMES DAILY. 10/3/22   Phuc Ortega MD   TRULICITY 4.5 HU/3.3XF SOPN INJECT 4.5 MG INTO THE SKIN ONCE A WEEK . 10/3/22   Phuc Ortega MD   levothyroxine (SYNTHROID) 50 MCG tablet TAKE 1 TABLET BY MOUTH ONCE DAILY. 10/3/22   Phuc Ortega MD   Multiple Vitamins-Minerals (EYE VITAMINS PO) Take 1 tablet by mouth daily    Historical Provider, MD   Omega-3 Fatty Acids (FISH OIL PO) Take 1 caplet by mouth daily    Historical Provider, MD   Probiotic Product (PROBIOTIC PO) Take 1 tablet by mouth daily    Historical Provider, MD   lisinopril (PRINIVIL;ZESTRIL) 10 MG tablet Take 1 tablet by mouth daily 8/29/22   Phuc Ortega MD   Continuous Blood Gluc Sensor (DEXCOM G6 SENSOR) MISC 1 Device by Does not apply route every 14 days 8/29/22   Kalin Morley MD   Continuous Blood Gluc Transmit (DEXCOM G6 TRANSMITTER) MISC 1 Device by Does not apply route every 3 months 8/29/22   Phuc Ortega MD   empagliflozin (JARDIANCE) 25 MG tablet TAKE 1 TABLET BY MOUTH ONCE DAILY.  7/14/22   Phcu Ortega MD   gabapentin (NEURONTIN) 400 MG capsule Change to 400 mg TID 7/14/22 11/16/22  Kalin Morley MD   Insulin Glargine, 2 Unit Dial, 300 UNIT/ML SOPN Change to 85 U in am , 85 U PM 6/27/22   Phuc Ortega MD   nitroGLYCERIN (NITROSTAT) 0.3 MG SL tablet Place 1 tablet under the tongue every 5 minutes as needed for Chest pain up to max of 3 total doses. If no relief after 1 dose, call 911. 4/26/22   Phuc Ortega MD   Insulin Pen Needle (TRUEPLUS PEN NEEDLES) 31G X 8 MM MISC USE AS DIRECTED 3/21/22   Phuc Ortega MD   sertraline (ZOLOFT) 100 MG tablet Take 100 mg by mouth daily  1/31/22   Historical Provider, MD   Continuous Blood Gluc  (DEXCOM G6 ) LIANNE 1 Device by Does not apply route 4 times daily (before meals and nightly) 3/22/21   MARILYN Scruggs - CNP   ARIPiprazole (ABILIFY) 5 MG tablet Take 5 mg by mouth daily  12/16/20   Historical Provider, MD       Allergies:  Pcn [penicillins], Actos [pioglitazone], Clindamycin/lincomycin, Vancomycin, and Metformin and related    Social History:    The patient currently lives at home. Tobacco use:   reports that he quit smoking about 22 years ago. His smoking use included cigars and cigarettes. He started smoking about 37 years ago. He has never used smokeless tobacco.  Alcohol use:   reports that he does not currently use alcohol. Drug use:  reports no history of drug use. Family History:   as follows:      Problem Relation Age of Onset    Diabetes Mother     Other Mother         pneumonia, H1N1    Depression Mother     Early Death Mother     High Blood Pressure Mother     High Cholesterol Mother     Vision Loss Maternal Grandmother     Arthritis Maternal Grandfather     Heart Disease Maternal Grandfather        Review of Systems:   Pertinent positives and negatives as noted in the HPI. Otherwise complete ROS negative. Physical Exam:    BP (!) 137/54   Pulse (!) 103   Temp 98.4 °F (36.9 °C) (Oral)   Resp 20   Wt 225 lb (102.1 kg)   SpO2 98%   BMI 36.32 kg/m²       General appearance: No apparent distress, appears stated age.   Eyes: Pupils equal, round, and reactive to light. Conjunctivae/corneas clear. HENT: Head normal in appearance. External nares normal.  Oral mucosa moist without lesions. Hearing grossly intact. Neck: Supple, with full range of motion. Trachea midline. No gross JVD appreciated. Respiratory:  Normal respiratory effort. Clear to auscultation, bilaterally without rales or wheezes or rhonchi. Cardiovascular: tachycardic rate, regular rhythm with normal S1/S2 without murmurs. No lower extremity edema. Abdomen: Soft, non-tender, non-distended with normal bowel sounds. Musculoskeletal: aka amputation of right leg, tenderness to palpation on chest  Skin: Warm and dry. No rashes or lesions. Neurologic:  No focal sensory/motor deficits in the upper and lower extremities. Cranial nerves:  grossly non-focal 2-12. Psychiatric: Alert and oriented, normal insight and thought content. Capillary Refill: Brisk,< 3 seconds. Peripheral Pulses: +2 palpable, equal bilaterally. Labs:     Recent Labs     11/23/22  0900   WBC 7.2   HGB 15.9   HCT 45.3        Recent Labs     11/23/22  0900      K 4.1   CL 95*   CO2 25   BUN 13   CREATININE 1.0   CALCIUM 9.3   PHOS 2.6     Recent Labs     11/23/22  0900   AST 43*   ALT 30   BILITOT 0.5   ALKPHOS 79     No results for input(s): INR in the last 72 hours. No results for input(s): [place] in the last 72 hours. Lab Results   Component Value Date/Time    NITRU NEGATIVE 07/14/2022 10:30 PM    WBCUA 0-2 07/14/2022 10:30 PM    BACTERIA NONE SEEN 07/14/2022 10:30 PM    RBCUA 0-2 07/14/2022 10:30 PM    BLOODU NEGATIVE 07/14/2022 10:30 PM    SPECGRAV 1.025 07/27/2021 10:00 PM    GLUCOSEU 250 07/14/2022 10:30 PM         Radiology:     XR CHEST (2 VW)   Final Result   Stable radiographic appearance of the chest. No evidence of an acute process. **This report has been created using voice recognition software.  It may contain minor errors which are inherent in voice recognition technology. **      Final report electronically signed by Dr. Addy Pope on 11/23/2022 9:31 AM      CTA CHEST [address]    (Results Pending)          EKG:  I have reviewed the EKG with the following interpretation:   Sinus tachycardia         Diet: Normal diet   DVT prophylaxis: heparin drip  Code Status: Prior  Disposition: admit to [facility]      Thank you Roland Cam MD for the opportunity to be involved in this patient's care.     Electronically signed by CINDY Montiel on 11/23/2022 at 10:46 AM.

## 2022-11-23 NOTE — ED NOTES
ED to inpatient nurses report    Chief Complaint   Patient presents with    Chest Pain      Present to ED from home  LOC: alert and orientated to name, place, date  Vital signs   Vitals:    11/23/22 0851 11/23/22 0927 11/23/22 1034 11/23/22 1117   BP:  124/67 (!) 137/54 134/63   Pulse:  (!) 103 (!) 103 (!) 109   Resp:  20 20 20   Temp: 98.4 °F (36.9 °C)   98.5 °F (36.9 °C)   TempSrc: Oral   Oral   SpO2:  100% 98% 99%   Weight:          Oxygen Baseline room air    Current needs required room air  LDAs:   Peripheral IV 11/23/22 Right Forearm (Active)   Site Assessment Clean, dry & intact 11/23/22 1117   Line Status Infusing 11/23/22 1117     Mobility: Independent  Pending ED orders: ED orders complete  Present condition: stable      C-SSRS    Swallow Screening    Preferred Language: [place]     Electronically signed by Sarai Ward RN on 11/23/2022 at Stan Dye RN  11/23/22 1123

## 2022-11-23 NOTE — ED PROVIDER NOTES
[place] ENCOUNTER          Pt Name: Enrique Rodriguez  MRN: [id number]  [place] 1970  Date of evaluation: 11/23/2022  Treating Resident Physician: Bart Haskins MD  Supervising Physician: Nelli Scruggs    History obtained from the patient. CHIEF COMPLAINT       Chief Complaint   Patient presents with    Chest Pain     HISTORY OF PRESENT ILLNESS    This is a 77-year-old male who presented to [address] ED on 11/23/2022. Patient endorses a 3-day history of chest pain. It started at rest while the patient was coughing. It is substernal with radiation down the left arm, worse with movement, reproducible with palpation, worse with breathing/coughing, sharp, and aching. He rates it as an 8 out of 10. He endorses a history of such pain, but it is never been so severe in the past.  Review of systems is positive for cough with green and yellow sputum production, fever, chills, nausea, vomiting, hemoptysis, hematemesis, diarrhea, and worsening paresthesias of the upper and lower extremities. Patient denies any change in urination, hematochezia, melena, constipation, vision changes, headaches, falls, loss of consciousness. Patient states that he is a diabetic and is taking 80 units of Lantus twice a day and is taking 35 to 40 units of short acting insulin 3 times a day. He states that he is on continuous glucose monitoring and is typically seen values in the 200s and 300s. He states that he is able to feel when he is hypoglycemic. He denies recent hospitalization, surgical intervention, change in medication, or recent illness. He endorses sick contacts. He states that he smokes 2 to 3 cigars/day, vapes 4 days out of the week. He denies any alcohol, marijuana, or recreational drug use. He denies any changes in diet. He denies any additional acute symptoms or concerns. He denies use of any blood thinners.       HEART SCORE 6  NANCI 21      REVIEW OF 8/21/2020    CYSTOSCOPY, UROLIFT performed by Farzana Cordova MD at [address] [facility], ESOPHAGUS      ENDOSCOPY, COLON, DIAGNOSTIC      FOOT DEBRIDEMENT Right 07/01/2016    I & D    GASTROCNEMIUS RECESSION Left 11/12/2021    LEFT LEG GASTROCS RECESSION performed by Cristal Ramos MD at [address] [facility] Right 2/18/2019    I&D RIGHT STUMP performed by Lizzette Garcia MD at [facility] Right 07/20/2016    LEG AMPUTATION BELOW KNEE Right 4/4/2019    I&D AND REVISION OF AMPUTATION RIGHT LEG performed by Lizzette Garcia MD at [address] [place] [address] Right 1/14/15    sole of foot I&D    OK DRAIN INFECT SHOULDER BURSA Left 8/18/2017    LEFT SHOULDER INCISION AND DRAINAGE performed by Lizzette Garcia MD at [address] [place] [address] OFFICE/OUTPT [address] [facility] Right 9/20/2018    EXCISIONAL DEBRIDEMENT RIGHT BKA STUMP performed by Mustapha Giron MD at [address] Right 1/16/15    2nd toe with wound vac applied    UPPER GASTROINTESTINAL ENDOSCOPY N/A 3/3/2020    EGD DILATION SAVORY performed by Rekha Dyer MD at [address] [facility] N/A 6/15/2022    EGD DILATION BALLOON performed by Chrissie Amin MD at [address] [facility]  ? when         MEDICATIONS     Current Facility-Administered Medications:     aspirin EC tablet 324 mg, 324 mg, Oral, Daily, José Miguel Norman MD, 324 mg at 11/23/22 1130    heparin (porcine) injection 4,000 Units, 4,000 Units, IntraVENous, PRN, José Miguel Norman MD    heparin (porcine) injection 2,000 Units, 2,000 Units, IntraVENous, PRN, José Miguel Norman MD    heparin 25,000 units in dextrose 5% 250 mL (premix) infusion, 5-30 Units/kg/hr, IntraVENous, Continuous, José Miguel Norman MD, Last Rate: 9.2 mL/hr at 11/23/22 1115, 9 Units/kg/hr at 11/23/22 1115    sodium chloride flush 0.9 % injection 10 mL, 10 mL, IntraVENous, 2 times per day, CINDY Ledezma    sodium chloride flush 0.9 % injection 10 mL, 10 mL, IntraVENous, PRN, CINDY Pat    0.9 % sodium chloride infusion, , IntraVENous, PRN, CINDY Pat    ondansetron (ZOFRAN-ODT) disintegrating tablet 4 mg, 4 mg, Oral, Q8H PRN **OR** ondansetron (ZOFRAN) injection 4 mg, 4 mg, IntraVENous, Q6H PRN, CINDY Pat    polyethylene glycol (GLYCOLAX) packet 17 g, 17 g, Oral, Daily PRN, CINDY Pat    acetaminophen (TYLENOL) tablet 650 mg, 650 mg, Oral, Q6H PRN **OR** acetaminophen (TYLENOL) suppository 650 mg, 650 mg, Rectal, Q6H PRN, CINDY Pat    potassium chloride (KLOR-CON M) extended release tablet 40 mEq, 40 mEq, Oral, PRN **OR** potassium bicarb-citric acid (EFFER-K) effervescent tablet 40 mEq, 40 mEq, Oral, PRN **OR** potassium chloride 10 mEq/100 mL IVPB (Peripheral Line), 10 mEq, IntraVENous, PRN, CINDY Pat    magnesium sulfate 2000 mg in 50 mL IVPB premix, 2,000 mg, IntraVENous, PRN, CINDY Pat    ARIPiprazole (ABILIFY) tablet 5 mg, 5 mg, Oral, Daily, CINYD Pat, 5 mg at 11/23/22 1419    atorvastatin (LIPITOR) tablet 80 mg, 80 mg, Oral, Nightly, CINDY Pat    levothyroxine (SYNTHROID) tablet 50 mcg, 50 mcg, Oral, Daily, CINDY Pat, 50 mcg at 11/23/22 1419    sertraline (ZOLOFT) tablet 100 mg, 100 mg, Oral, Daily, CINDY Pat, 100 mg at 11/23/22 1419    metoprolol tartrate (LOPRESSOR) tablet 50 mg, 50 mg, Oral, BID, CINDY Pat, 50 mg at 11/23/22 1419    lisinopril (PRINIVIL;ZESTRIL) tablet 10 mg, 10 mg, Oral, Daily, CINDY Pat, 10 mg at 11/23/22 1420    nitroGLYCERIN (NITROSTAT) SL tablet 0.4 mg, 0.4 mg, SubLINGual, Q5 Min PRN, CINDY Pat    gabapentin (NEURONTIN) capsule 300 mg, 300 mg, Oral, TID, CINDY Pat, 300 mg at 11/23/22 1419    glucose chewable tablet 16 g, 4 tablet, Oral, PRN, CINDY Pat    dextrose bolus 10% 125 mL, 125 mL, IntraVENous, PRN **OR** dextrose bolus 10% 250 mL, 250 mL, IntraVENous, PRN, CINDY Pat    glucagon (rDNA) injection 1 mg, 1 mg, SubCUTAneous, PRN, CINDY Pat dextrose 10 % infusion, , IntraVENous, Continuous PRN, CINDY Cortes    insulin lispro (HUMALOG) injection vial 20 Units, 20 Units, SubCUTAneous, TID WC, CINDY Cortes, 20 Units at 11/23/22 1420    ascorbic acid (VITAMIN C) tablet 500 mg, 500 mg, Oral, Daily, CINDY Cortes, 500 mg at 11/23/22 1420    zinc sulfate (ZINCATE) capsule 50 mg, 50 mg, Oral, Daily, CINDY Cortes, 50 mg at 11/23/22 1420    Vitamin D (CHOLECALCIFEROL) tablet 1,000 Units, 1,000 Units, Oral, Daily, CINDY Cortes, 1,000 Units at 11/23/22 1420    benzonatate (TESSALON) capsule 200 mg, 200 mg, Oral, TID PRN, CINDY Cortes    lactated ringers infusion, , IntraVENous, Continuous, CINDY Cortes, Last Rate: 75 mL/hr at 11/23/22 1420, New Bag at 11/23/22 1420    insulin glargine (LANTUS) injection vial 50 Units, 50 Units, SubCUTAneous, Nightly, CINDY Licona    insulin lispro (HUMALOG) injection vial 0-8 Units, 0-8 Units, SubCUTAneous, TID WC, CINDY Licona    insulin lispro (HUMALOG) injection vial 0-4 Units, 0-4 Units, SubCUTAneous, Nightly, CINDY Cortes      SOCIAL HISTORY   Per HPI    ALLERGIES     Allergies   Allergen Reactions    Pcn [Penicillins] Shortness Of Breath, Nausea And Vomiting and Other (See Comments)     Does not remember reactions. Has TOLERATED amoxicillin and several different cephalosporins. Actos [Pioglitazone] Swelling    Clindamycin/Lincomycin Itching    Vancomycin Hives      Rash, with erythematous plaques to abdomen, shoulders, and proximal upper extremities with pruritis, persisted with 2nd dose administered slower.       Metformin And Related Swelling         FAMILY HISTORY     Family History   Problem Relation Age of Onset    Diabetes Mother     Other Mother         pneumonia, H1N1    Depression Mother     Early Death Mother     High Blood Pressure Mother     High Cholesterol Mother     Vision Loss Maternal Grandmother     Arthritis Maternal Grandfather     Heart Disease Maternal Grandfather          PREVIOUS RECORDS Discharge summary from inpatient encounter from 07/14/2022 to 07/15/2022. Patient was seen and evaluated for diarrhea, nausea, vomiting, lactic acidosis in the context of his known history of diabetes mellitus type 2 with diabetic neuropathy. Patient has been started on Flagyl and Rocephin. Patient was assessed to have a possible malabsorptive condition. He was discharged on a regimen of Jardiance 25 mg, gabapentin 400 mg 3 times daily, glargine 85 units a.m. and p.m., lispro 40 units plus sliding scale, and Trulicity once a week. 09/15/2022 HbA1c 7.8. PHYSICAL EXAM     ED Triage Vitals   BP Temp Temp Source Heart Rate Resp SpO2 Height Weight   11/23/22 0848 11/23/22 0851 11/23/22 0851 11/23/22 0848 11/23/22 0848 11/23/22 0848 -- 11/23/22 0848   (!) 154/74 98.4 °F (36.9 °C) Oral (!) 110 18 100 %  225 lb (102.1 kg)     Initial vital signs and nursing assessment reviewed and abnormal from abnormal . Body mass index is 36.32 kg/m². Pulsoximetry is abnormal per my interpretation. Additional Vital Signs:  Vitals:    11/23/22 1245   BP: (!) 152/77   Pulse: 99   Resp: 16   Temp: 97.8 °F (36.6 °C)   SpO2: 99%       Physical Exam  Constitutional:       Appearance: Normal appearance. HENT:      Head: Normocephalic and atraumatic. Right Ear: Tympanic membrane normal.      Left Ear: Tympanic membrane normal.      Nose: Nose normal.      Mouth/Throat:      Mouth: Mucous membranes are dry. Eyes:      Extraocular Movements: Extraocular movements intact. Pupils: Pupils are equal, round, and reactive to light. Cardiovascular:      Rate and Rhythm: Regular rhythm. Tachycardia present. Pulses: Normal pulses. Heart sounds: Normal heart sounds. Pulmonary:      Comments: Diminished breath sounds bilaterally with no wheezes, crackles, or rhonchi  Chest:      Comments: Pain elicited with palpation of the sternum. No evidence of gross deformity, discoloration, erythema, or discharge.   Abdominal: General: Abdomen is flat. There is no distension. Palpations: Abdomen is soft. There is no mass. Tenderness: There is no right CVA tenderness, left CVA tenderness, guarding or rebound. Hernia: No hernia is present. Comments: Tenderness to palpation elicited from the right lower quadrant. No ecchymosis, discharge, erythema, or discoloration. Musculoskeletal:      Cervical back: Normal range of motion and neck supple. Comments: Absence of the right leg. Right leg stump with no evidence of discharge, erythema, or discoloration. Left leg with full range of motion without pain elicited. +2 pulses in the left lower extremity. Skin:     General: Skin is warm and dry. Capillary Refill: Capillary refill takes less than 2 seconds. Neurological:      General: No focal deficit present. Mental Status: He is alert and oriented to person, place, and time. Psychiatric:         Mood and Affect: Mood normal.         Behavior: Behavior normal.         Thought Content:  Thought content normal.         Judgment: Judgment normal.           MEDICAL DECISION MAKING   Initial Assessment:   ACS, rule out  Pulmonary embolism, rule out  Uncontrolled diabetes mellitus type 2 with hyperglycemia, rule out  Costochondritis, rule out  Pleuritic chest pain, rule out  COVID-19, rule out  Influenza, rule out  Acute exacerbation of suspected COPD, rule out  Pancreatitis, rule out  Plan:   CBC  CMP  Troponin  EKG  CXR  D-dimer  CRP  ESR  COVID and influenza testing  Lipase  Magnesium  Phosphorus  TSH    ED RESULTS   Laboratory results:  Labs Reviewed   COVID-19 & INFLUENZA COMBO - Abnormal; Notable for the following components:       Result Value    SARS-CoV-2 RNA, RT PCR DETECTED (*)     All other components within normal limits   TROPONIN - Abnormal; Notable for the following components:    Troponin T 0.028 (*)     All other components within normal limits   CBC WITH AUTO DIFFERENTIAL - Abnormal; Notable for the following components:    RDW-SD 47.3 (*)     All other components within normal limits   COMPREHENSIVE METABOLIC PANEL - Abnormal; Notable for the following components:    Glucose 270 (*)     Chloride 95 (*)     AST 43 (*)     All other components within normal limits   C-REACTIVE PROTEIN - Abnormal; Notable for the following components:    CRP 5.97 (*)     All other components within normal limits   SEDIMENTATION RATE - Abnormal; Notable for the following components:    Sed Rate 52 (*)     All other components within normal limits   D-DIMER, QUANTITATIVE - Abnormal; Notable for the following components:    D-Dimer, Quant 1229.00 (*)     All other components within normal limits   LACTIC ACID - Abnormal; Notable for the following components:    Lactic Acid 2.2 (*)     All other components within normal limits   FERRITIN - Abnormal; Notable for the following components:    Ferritin 546 (*)     All other components within normal limits   VITAMIN D 25 HYDROXY - Abnormal; Notable for the following components:    Vit D, 25-Hydroxy 17 (*)     All other components within normal limits   LACTATE DEHYDROGENASE - Abnormal; Notable for the following components:     (*)     All other components within normal limits   TROPONIN - Abnormal; Notable for the following components:    Troponin T 0.015 (*)     All other components within normal limits   POCT GLUCOSE - Abnormal; Notable for the following components:    POC Glucose 517 (*)     All other components within normal limits   MAGNESIUM   PHOSPHORUS   LIPASE   GLOMERULAR FILTRATION RATE, ESTIMATED   TSH WITH REFLEX   ANION GAP   APTT   PROTIME-INR   TROPONIN   ANTI-XA, UNFRACTIONATED HEPARIN   ANTI-XA, UNFRACTIONATED HEPARIN   TROPONIN   TROPONIN   LACTATE DEHYDROGENASE   LACTIC ACID   BASIC METABOLIC PANEL W/ REFLEX TO MG FOR LOW K       Radiologic studies results:  CTA CHEST W WO CONTRAST   Final Result    No pulmonary emboli or pulmonary infiltrates. **This report has been created using voice recognition software. It may contain minor errors which are inherent in voice recognition technology. **      Final report electronically signed by Dr. Radha Márquez on 11/23/2022 11:22 AM      XR CHEST (2 VW)   Final Result   Stable radiographic appearance of the chest. No evidence of an acute process. **This report has been created using voice recognition software. It may contain minor errors which are inherent in voice recognition technology. **      Final report electronically signed by Dr. Radha Márquez on 11/23/2022 9:31 AM          ED Medications administered this visit:   Medications   aspirin EC tablet 324 mg (324 mg Oral Given 11/23/22 1130)   heparin (porcine) injection 4,000 Units (has no administration in time range)   heparin (porcine) injection 2,000 Units (has no administration in time range)   heparin 25,000 units in dextrose 5% 250 mL (premix) infusion (9 Units/kg/hr × 102.1 kg IntraVENous New Bag 11/23/22 1115)   sodium chloride flush 0.9 % injection 10 mL (has no administration in time range)   sodium chloride flush 0.9 % injection 10 mL (has no administration in time range)   0.9 % sodium chloride infusion (has no administration in time range)   ondansetron (ZOFRAN-ODT) disintegrating tablet 4 mg (has no administration in time range)     Or   ondansetron (ZOFRAN) injection 4 mg (has no administration in time range)   polyethylene glycol (GLYCOLAX) packet 17 g (has no administration in time range)   acetaminophen (TYLENOL) tablet 650 mg (has no administration in time range)     Or   acetaminophen (TYLENOL) suppository 650 mg (has no administration in time range)   potassium chloride (KLOR-CON M) extended release tablet 40 mEq (has no administration in time range)     Or   potassium bicarb-citric acid (EFFER-K) effervescent tablet 40 mEq (has no administration in time range)     Or   potassium chloride 10 mEq/100 mL IVPB (Peripheral Line) (has no administration in time range)   magnesium sulfate 2000 mg in 50 mL IVPB premix (has no administration in time range)   ARIPiprazole (ABILIFY) tablet 5 mg (5 mg Oral Given 11/23/22 1419)   atorvastatin (LIPITOR) tablet 80 mg (has no administration in time range)   levothyroxine (SYNTHROID) tablet 50 mcg (50 mcg Oral Given 11/23/22 1419)   sertraline (ZOLOFT) tablet 100 mg (100 mg Oral Given 11/23/22 1419)   metoprolol tartrate (LOPRESSOR) tablet 50 mg (50 mg Oral Given 11/23/22 1419)   lisinopril (PRINIVIL;ZESTRIL) tablet 10 mg (10 mg Oral Given 11/23/22 1420)   nitroGLYCERIN (NITROSTAT) SL tablet 0.4 mg (has no administration in time range)   gabapentin (NEURONTIN) capsule 300 mg (300 mg Oral Given 11/23/22 1419)   glucose chewable tablet 16 g (has no administration in time range)   dextrose bolus 10% 125 mL (has no administration in time range)     Or   dextrose bolus 10% 250 mL (has no administration in time range)   glucagon (rDNA) injection 1 mg (has no administration in time range)   dextrose 10 % infusion (has no administration in time range)   insulin lispro (HUMALOG) injection vial 20 Units (20 Units SubCUTAneous Given 11/23/22 1420)   ascorbic acid (VITAMIN C) tablet 500 mg (500 mg Oral Given 11/23/22 1420)   zinc sulfate (ZINCATE) capsule 50 mg (50 mg Oral Given 11/23/22 1420)   Vitamin D (CHOLECALCIFEROL) tablet 1,000 Units (1,000 Units Oral Given 11/23/22 1420)   benzonatate (TESSALON) capsule 200 mg (has no administration in time range)   lactated ringers infusion ( IntraVENous New Bag 11/23/22 1420)   insulin glargine (LANTUS) injection vial 50 Units (has no administration in time range)   insulin lispro (HUMALOG) injection vial 0-8 Units (has no administration in time range)   insulin lispro (HUMALOG) injection vial 0-4 Units (has no administration in time range)   heparin (porcine) injection 4,000 Units (4,000 Units IntraVENous Given 11/23/22 1116)   iopamidol (ISOVUE-370) 76 % injection 80 mL (80 mLs IntraVENous Given 11/23/22 1103)         ED COURSE   Patient was seen and evaluated at the bedside in no acute distress. Physical examination was remarkable for tenderness to palpation of the sternum. There is also evidence of diminished breath sounds bilaterally. There was also tenderness to palpation of the right lower quadrant of the abdomen. Patient's CXR was reviewed and unremarkable. COVID-19 testing was positive. His initial troponin was elevated. CMP was significant for an elevation of AST as well as glucose. His CRP, D-DIMER, and ESR were also elevated. A CTA of the chest showed no evidence of pulmonary emboli. EKG showed evidence of sinus tachycardia with a ventricular rate of 106. Given the derangements in the patient's labs as well as his elevated troponin and his HEART score of 6, he was given a loading dose of aspirin and started on a heparin drip. The hospitalist was contacted and all lab findings as well as his presentation were relayed. The hospitalist graciously agreed to admit the patient for further evaluation. The patient was placed in isolation and all findings were relayed to the patient at bedside. He verbalized understanding and is agreeable to admission. The patient was admitted in stable condition on 11/23/2022. MEDICATION CHANGES     Current Discharge Medication List            FINAL DISPOSITION     Final diagnoses:   [id number] virus infection   Chest pain on breathing   Troponin level elevated     Condition: condition: stable  Dispo: Admit to telemetry      This transcription was electronically signed. Parts of this transcriptions may have been dictated by use of voice recognition software and electronically transcribed, and parts may have been transcribed with the assistance of an ED scribe. The transcription may contain errors not detected in proofreading.   Please refer to my supervising physician's documentation if my documentation differs.     Electronically Signed: Bonnie Odonnell MD, 11/23/22, 3:26 PM        Bonnie Odonnell MD  Resident  11/23/22 1540

## 2022-11-23 NOTE — ED PROVIDER NOTES
[id number]    Pt Name: Paramjit Bacon  MRN: [id number]  [place] 1970  Date of evaluation: 9/12/20      I personally saw and examined the patient. I have reviewed and agree with the Resident findings, including all diagnostic interpretations and treatment plans as written. I was present for the key portion of any procedures performed and the inclusive time noted in any critical care statement. History: This patient was seen with Syed Hodges, resident physician. 57-year-old male with history of chest pain. It did radiate down the arm. Patient has multiple cardiac risk factors including smoking and diabetes. The patient was found to have an elevated troponin while here while getting his chest pain evaluated. He has never had a heart catheterization and does have a history of peripheral vascular disease with a right sided leg amputation. He will need to come in for rule out. However we also note that he is COVID-positive. CTA of the chest is negative for pulmonary embolism or other catastrophic process.       Diagnosis: Chest pain with elevated troponin, covid  Admitted              Mary Calixto,   11/23/22 1543

## 2022-11-23 NOTE — PROGRESS NOTES
Patients glucose 517, notified hospitalist, orders for carb control diet and dose of Lantus at this time.

## 2022-11-24 PROBLEM — U07.1 COVID-19 VIRUS INFECTION: Status: ACTIVE | Noted: 2022-11-24

## 2022-11-24 LAB
ADENOVIRUS F 40 41 PCR: NOT DETECTED
ALBUMIN SERPL-MCNC: 3.6 G/DL (ref 3.5–5.1)
ALP BLD-CCNC: 68 U/L (ref 38–126)
ALT SERPL-CCNC: 20 U/L (ref 11–66)
ANION GAP SERPL CALCULATED.3IONS-SCNC: 9 MEQ/L (ref 8–16)
AST SERPL-CCNC: 27 U/L (ref 5–40)
ASTROVIRUS PCR: NOT DETECTED
BASOPHILS # BLD: 0.7 %
BASOPHILS ABSOLUTE: 0.1 THOU/MM3 (ref 0–0.1)
BILIRUB SERPL-MCNC: 0.4 MG/DL (ref 0.3–1.2)
BUN BLDV-MCNC: 17 MG/DL (ref 7–22)
CALCIUM SERPL-MCNC: 8.4 MG/DL (ref 8.5–10.5)
CAMPYLOBACTER PCR: NOT DETECTED
CHLORIDE BLD-SCNC: 98 MEQ/L (ref 98–111)
CLOSTRIDIUM DIFFICILE, PCR: NOT DETECTED
CO2: 26 MEQ/L (ref 23–33)
CREAT SERPL-MCNC: 0.8 MG/DL (ref 0.4–1.2)
CRYPTOSPORIDIUM PCR: NOT DETECTED
CYCLOSPORA CAYETANENSIS PCR: NOT DETECTED
E COLI 0157 PCR: NORMAL
E COLI ENTEROAGGREGATIVE PCR: NOT DETECTED
E COLI ENTEROPATHOGENIC PCR: NOT DETECTED
E COLI ENTEROTOXIGENIC PCR: NOT DETECTED
E COLI SHIGA LIKE TOXIN PCR: NOT DETECTED
E COLI SHIGELLA/ENTEROINVASIVE PCR: NOT DETECTED
E HISTOLYTICA GI FILM ARRAY: NOT DETECTED
EKG ATRIAL RATE: 99 BPM
EKG P AXIS: 22 DEGREES
EKG P-R INTERVAL: 176 MS
EKG Q-T INTERVAL: 360 MS
EKG QRS DURATION: 76 MS
EKG QTC CALCULATION (BAZETT): 462 MS
EKG R AXIS: 5 DEGREES
EKG T AXIS: 38 DEGREES
EKG VENTRICULAR RATE: 99 BPM
EOSINOPHIL # BLD: 3.9 %
EOSINOPHILS ABSOLUTE: 0.3 THOU/MM3 (ref 0–0.4)
ERYTHROCYTE [DISTWIDTH] IN BLOOD BY AUTOMATED COUNT: 14.2 % (ref 11.5–14.5)
ERYTHROCYTE [DISTWIDTH] IN BLOOD BY AUTOMATED COUNT: 46.6 FL (ref 35–45)
GFR SERPL CREATININE-BSD FRML MDRD: > 60 ML/MIN/1.73M2
GIARDIA LAMBLIA PCR: NOT DETECTED
GLUCOSE BLD-MCNC: 114 MG/DL (ref 70–108)
GLUCOSE BLD-MCNC: 116 MG/DL (ref 70–108)
GLUCOSE BLD-MCNC: 116 MG/DL (ref 70–108)
GLUCOSE BLD-MCNC: 125 MG/DL (ref 70–108)
GLUCOSE BLD-MCNC: 96 MG/DL (ref 70–108)
HCT VFR BLD CALC: 40.6 % (ref 42–52)
HEMOGLOBIN: 14.1 GM/DL (ref 14–18)
HEPARIN UNFRACTIONATED: 0.21 U/ML (ref 0.3–0.7)
HEPARIN UNFRACTIONATED: 0.32 U/ML (ref 0.3–0.7)
HEPARIN UNFRACTIONATED: 0.34 U/ML (ref 0.3–0.7)
IMMATURE GRANS (ABS): 0.02 THOU/MM3 (ref 0–0.07)
IMMATURE GRANULOCYTES: 0.3 %
LACTIC ACID: 1 MMOL/L (ref 0.5–2)
LACTIC ACID: 2.3 MMOL/L (ref 0.5–2)
LYMPHOCYTES # BLD: 36.5 %
LYMPHOCYTES ABSOLUTE: 2.7 THOU/MM3 (ref 1–4.8)
MCH RBC QN AUTO: 31.5 PG (ref 26–33)
MCHC RBC AUTO-ENTMCNC: 34.7 GM/DL (ref 32.2–35.5)
MCV RBC AUTO: 90.6 FL (ref 80–94)
MONOCYTES # BLD: 13 %
MONOCYTES ABSOLUTE: 1 THOU/MM3 (ref 0.4–1.3)
NOROVIRUS GI GII PCR: NOT DETECTED
NUCLEATED RED BLOOD CELLS: 0 /100 WBC
PLATELET # BLD: 168 THOU/MM3 (ref 130–400)
PLESIOMONAS SHIGELLOIDES PCR: NOT DETECTED
PMV BLD AUTO: 9.3 FL (ref 9.4–12.4)
POTASSIUM SERPL-SCNC: 4.2 MEQ/L (ref 3.5–5.2)
RBC # BLD: 4.48 MILL/MM3 (ref 4.7–6.1)
ROTAVIRUS A PCR: NOT DETECTED
SALMONELLA PCR: NOT DETECTED
SAPOVIRUS PCR: NOT DETECTED
SEG NEUTROPHILS: 45.6 %
SEGMENTED NEUTROPHILS ABSOLUTE COUNT: 3.4 THOU/MM3 (ref 1.8–7.7)
SODIUM BLD-SCNC: 133 MEQ/L (ref 135–145)
TOTAL PROTEIN: 6.1 G/DL (ref 6.1–8)
VIBRIO CHOLERAE PCR: NOT DETECTED
VIBRIO PCR: NOT DETECTED
WBC # BLD: 7.5 THOU/MM3 (ref 4.8–10.8)
YERSINIA ENTEROCOLITICA PCR: NOT DETECTED

## 2022-11-24 PROCEDURE — 85025 COMPLETE CBC W/AUTO DIFF WBC: CPT

## 2022-11-24 PROCEDURE — 1200000000 HC SEMI PRIVATE

## 2022-11-24 PROCEDURE — 82948 REAGENT STRIP/BLOOD GLUCOSE: CPT

## 2022-11-24 PROCEDURE — 36415 COLL VENOUS BLD VENIPUNCTURE: CPT

## 2022-11-24 PROCEDURE — 83605 ASSAY OF LACTIC ACID: CPT

## 2022-11-24 PROCEDURE — 2580000003 HC RX 258: Performed by: PHYSICIAN ASSISTANT

## 2022-11-24 PROCEDURE — 6360000002 HC RX W HCPCS

## 2022-11-24 PROCEDURE — 85520 HEPARIN ASSAY: CPT

## 2022-11-24 PROCEDURE — 93010 ELECTROCARDIOGRAM REPORT: CPT | Performed by: INTERNAL MEDICINE

## 2022-11-24 PROCEDURE — 99233 SBSQ HOSP IP/OBS HIGH 50: CPT | Performed by: INTERNAL MEDICINE

## 2022-11-24 PROCEDURE — 99223 1ST HOSP IP/OBS HIGH 75: CPT | Performed by: INTERNAL MEDICINE

## 2022-11-24 PROCEDURE — 6370000000 HC RX 637 (ALT 250 FOR IP)

## 2022-11-24 PROCEDURE — 6370000000 HC RX 637 (ALT 250 FOR IP): Performed by: PHYSICIAN ASSISTANT

## 2022-11-24 PROCEDURE — 80053 COMPREHEN METABOLIC PANEL: CPT

## 2022-11-24 RX ORDER — NITROGLYCERIN 20 MG/100ML
5 INJECTION INTRAVENOUS CONTINUOUS
Status: DISCONTINUED | OUTPATIENT
Start: 2022-11-24 | End: 2022-11-26 | Stop reason: HOSPADM

## 2022-11-24 RX ADMIN — HEPARIN SODIUM 2000 UNITS: 1000 INJECTION, SOLUTION INTRAVENOUS; SUBCUTANEOUS at 00:25

## 2022-11-24 RX ADMIN — INSULIN GLARGINE 50 UNITS: 100 INJECTION, SOLUTION SUBCUTANEOUS at 20:49

## 2022-11-24 RX ADMIN — Medication 1000 UNITS: at 08:30

## 2022-11-24 RX ADMIN — HEPARIN SODIUM 15 UNITS/KG/HR: 10000 INJECTION, SOLUTION INTRAVENOUS at 20:48

## 2022-11-24 RX ADMIN — GABAPENTIN 300 MG: 300 CAPSULE ORAL at 20:50

## 2022-11-24 RX ADMIN — OXYCODONE HYDROCHLORIDE AND ACETAMINOPHEN 500 MG: 500 TABLET ORAL at 08:30

## 2022-11-24 RX ADMIN — GABAPENTIN 300 MG: 300 CAPSULE ORAL at 08:32

## 2022-11-24 RX ADMIN — HEPARIN SODIUM 13 UNITS/KG/HR: 10000 INJECTION, SOLUTION INTRAVENOUS at 00:28

## 2022-11-24 RX ADMIN — SODIUM CHLORIDE, POTASSIUM CHLORIDE, SODIUM LACTATE AND CALCIUM CHLORIDE: 600; 310; 30; 20 INJECTION, SOLUTION INTRAVENOUS at 08:23

## 2022-11-24 RX ADMIN — Medication 50 MG: at 08:30

## 2022-11-24 RX ADMIN — HEPARIN SODIUM 15 UNITS/KG/HR: 10000 INJECTION, SOLUTION INTRAVENOUS at 05:59

## 2022-11-24 RX ADMIN — METOPROLOL TARTRATE 50 MG: 50 TABLET, FILM COATED ORAL at 20:50

## 2022-11-24 RX ADMIN — SODIUM CHLORIDE, POTASSIUM CHLORIDE, SODIUM LACTATE AND CALCIUM CHLORIDE: 600; 310; 30; 20 INJECTION, SOLUTION INTRAVENOUS at 20:48

## 2022-11-24 RX ADMIN — ASPIRIN 324 MG: 81 TABLET, COATED ORAL at 08:37

## 2022-11-24 RX ADMIN — LISINOPRIL 10 MG: 10 TABLET ORAL at 08:30

## 2022-11-24 RX ADMIN — METOPROLOL TARTRATE 50 MG: 50 TABLET, FILM COATED ORAL at 08:30

## 2022-11-24 RX ADMIN — GABAPENTIN 300 MG: 300 CAPSULE ORAL at 12:54

## 2022-11-24 RX ADMIN — ARIPIPRAZOLE 5 MG: 5 TABLET ORAL at 08:30

## 2022-11-24 RX ADMIN — SERTRALINE 100 MG: 100 TABLET, FILM COATED ORAL at 08:30

## 2022-11-24 RX ADMIN — ATORVASTATIN CALCIUM 80 MG: 80 TABLET, FILM COATED ORAL at 20:50

## 2022-11-24 RX ADMIN — HEPARIN SODIUM 2000 UNITS: 1000 INJECTION, SOLUTION INTRAVENOUS; SUBCUTANEOUS at 06:00

## 2022-11-24 RX ADMIN — LEVOTHYROXINE SODIUM 50 MCG: 0.05 TABLET ORAL at 06:07

## 2022-11-24 RX ADMIN — INSULIN LISPRO 20 UNITS: 100 INJECTION, SOLUTION INTRAVENOUS; SUBCUTANEOUS at 08:31

## 2022-11-24 ASSESSMENT — PAIN DESCRIPTION - ONSET: ONSET: GRADUAL

## 2022-11-24 ASSESSMENT — PAIN DESCRIPTION - FREQUENCY: FREQUENCY: INTERMITTENT

## 2022-11-24 ASSESSMENT — PAIN SCALES - GENERAL: PAINLEVEL_OUTOF10: 6

## 2022-11-24 ASSESSMENT — PAIN DESCRIPTION - PAIN TYPE: TYPE: ACUTE PAIN

## 2022-11-24 ASSESSMENT — PAIN DESCRIPTION - ORIENTATION: ORIENTATION: MID;UPPER

## 2022-11-24 ASSESSMENT — PAIN DESCRIPTION - DESCRIPTORS: DESCRIPTORS: DULL

## 2022-11-24 ASSESSMENT — PAIN DESCRIPTION - LOCATION: LOCATION: ABDOMEN

## 2022-11-24 ASSESSMENT — PAIN - FUNCTIONAL ASSESSMENT: PAIN_FUNCTIONAL_ASSESSMENT: ACTIVITIES ARE NOT PREVENTED

## 2022-11-24 NOTE — PLAN OF CARE
Problem: Discharge Planning  Goal: Discharge to home or other facility with appropriate resources  11/24/2022 0150 by Jennifer Patrick RN  Outcome: Progressing     Problem: Pain  Goal: Verbalizes/displays adequate comfort level or baseline comfort level  11/24/2022 0150 by Jennifer Patrick RN  Outcome: Progressing     Problem: Skin/Tissue Integrity  Goal: Absence of new skin breakdown  Description: 1. Monitor for areas of redness and/or skin breakdown  2. Assess vascular access sites hourly  3. Every 4-6 hours minimum:  Change oxygen saturation probe site  4. Every 4-6 hours:  If on nasal continuous positive airway pressure, respiratory therapy assess nares and determine need for appliance change or resting period.   11/24/2022 0150 by Jennifer Patrick RN  Outcome: Progressing     Problem: Safety - Adult  Goal: Free from fall injury  11/24/2022 0150 by Jennifer Patrick RN  Outcome: Progressing     Problem: ABCDS Injury Assessment  Goal: Absence of physical injury  11/24/2022 0150 by Jennifer Patrick RN  Outcome: Progressing     Problem: Metabolic/Fluid and Electrolytes - Adult  Goal: Electrolytes maintained within normal limits  11/24/2022 0150 by Jennifer Patrick RN  Outcome: Progressing     Problem: Metabolic/Fluid and Electrolytes - Adult  Goal: Hemodynamic stability and optimal renal function maintained  11/24/2022 0150 by Jennifer Patrick RN  Outcome: Progressing     Problem: Metabolic/Fluid and Electrolytes - Adult  Goal: Glucose maintained within prescribed range  11/24/2022 0150 by Jennifer Patrick RN  Outcome: Progressing     Problem: Chronic Conditions and Co-morbidities  Goal: Patient's chronic conditions and co-morbidity symptoms are monitored and maintained or improved  11/24/2022 0150 by Jennifer Patrick RN  Outcome: Progressing

## 2022-11-24 NOTE — PROGRESS NOTES
Hospitalist Progress Note      Patient:  Katrin Cowart    Unit/Bed:6K-26/026-A  YOB: 1970  MRN: [id number]   Acct: [de-identified]     PCP: Osmani Avila MD  Date of Admission: 11/23/2022     NSTEMI-consult cardiology 11/24, aspirin, heparin drip started last night, nitro, statin, metoprolol, lisinopril-likely will need cardiac cath  Continue to trend troponin follow-up 2D echo      COVID19 infection no hypoxic respiratory failure  CTA chest no infiltrates noted, negative for PE  Supportive care- vitamins, cough suppressants, incentive spirometry     Lactic acidosis-resolved with IV fluids      Type II diabetes with diabetic neuropathy   Held home medications- jardiance and trulicity   40 units humalog before meals- start 20 units titrate up   Lantus 85 units daily- start with 50 units   Low correction scale, hypoglycemia protocol  Inpatient BS goal 140 -200     Chronic CHFpEF-compensated  Echo 60% Ef  on 9/15/20 - grade 1 diastolic dysfunction  Repeat echo pending  Daily weights, I and O's   Continue metoprolol and lisinopril     History of AKA   Right leg above knee amputation noted     Hypothyroidism   Synthroid 50 mcg     Hyperlipidemia   Statin increased to 80 mg      =======================================================================      Chief Complaint:  Chest pain     History Of Present Illness:  Katrin Cowart is a 45 y.o. male with PMHx of IIDM2 with neuropathy, primary hypertension, hypothyroidism who presents to [address] [place] [address] with chest pain. Patient states his chest pain started Saturday night 11/19 and has been constant since. He states the chest pain is around his sternum and sharp, reproducible with palpation, coughing and deep breathing. He also endorses dull aching into his left shoulder with numbness going down his left arm. He is unable to identifying any alleviating factors. He did not try any nitro and does not typically use it.    He had trouble discerning if his chest pain worsened by physical exertion or by moving his upper body/chest area. It appears to be the latter. He states he has had nausea and vomiting since the onset of his pain. He vomited twice on Saturday once each day since then as well as loose stools. diaarhea. He denies hematemesis, melena or sara bloody stools. He did not endorse any hemoptysis during my history. ED course:   CTA chest, EKG   Subjective:- (Last 24 hours)    No chest pain no shortness of breath no dizziness overnight      Past medical history, family history, social history and allergies reviewed again and is unchanged since admission. ROS (All review of systems completed. Pertinent positives noted.  Otherwise All other systems reviewed and negative.)     Scheduled Meds:   aspirin  324 mg Oral Daily    sodium chloride flush  10 mL IntraVENous 2 times per day    ARIPiprazole  5 mg Oral Daily    atorvastatin  80 mg Oral Nightly    levothyroxine  50 mcg Oral Daily    sertraline  100 mg Oral Daily    metoprolol tartrate  50 mg Oral BID    lisinopril  10 mg Oral Daily    gabapentin  300 mg Oral TID    insulin lispro  20 Units SubCUTAneous TID WC    ascorbic acid  500 mg Oral Daily    zinc sulfate  50 mg Oral Daily    Vitamin D  1,000 Units Oral Daily    insulin glargine  50 Units SubCUTAneous Nightly    insulin lispro  0-8 Units SubCUTAneous TID WC    insulin lispro  0-4 Units SubCUTAneous Nightly     Continuous Infusions:   nitroGLYCERIN Stopped (11/24/22 1252)    heparin (PORCINE) Infusion 15 Units/kg/hr (11/24/22 1245)    sodium chloride      dextrose      lactated ringers 75 mL/hr at 11/24/22 0823     PRN Meds:.heparin (porcine), heparin (porcine), sodium chloride flush, sodium chloride, ondansetron **OR** ondansetron, polyethylene glycol, acetaminophen **OR** acetaminophen, potassium chloride **OR** potassium alternative oral replacement **OR** potassium chloride, magnesium sulfate, nitroGLYCERIN, glucose, dextrose bolus **OR** dextrose bolus, glucagon (rDNA), dextrose, benzonatate     Physical Exam:    /66   Pulse 83   Temp 97.8 °F (36.6 °C) (Oral)   Resp 18   Ht 5' 6\" (1.676 m)   Wt 224 lb 14.4 oz (102 kg)   SpO2 98%   BMI 36.30 kg/m²       General appearance: No apparent distress, appears stated age. Eyes:  Pupils equal, round, and reactive to light. Conjunctivae/corneas clear. HENT: Head normal in appearance. External nares normal.  Oral mucosa moist without lesions. Hearing grossly intact. Neck: Supple, with full range of motion. Trachea midline. No gross JVD appreciated. Respiratory:  Normal respiratory effort. Clear to auscultation, bilaterally without rales or wheezes or rhonchi. Cardiovascular: tachycardic rate, regular rhythm with normal S1/S2 without murmurs. No lower extremity edema. Abdomen: Soft, non-tender, non-distended with normal bowel sounds. Musculoskeletal: aka amputation of right leg, tenderness to palpation on chest  Skin: Warm and dry. No rashes or lesions. Neurologic:  No focal sensory/motor deficits in the upper and lower extremities. Cranial nerves:  grossly non-focal 2-12. Psychiatric: Alert and oriented, normal insight and thought content. Capillary Refill: Brisk,< 3 seconds. Peripheral Pulses: +2 palpable, equal bilaterally. Labs:     Recent Labs     11/23/22  0900 11/24/22  0451   WBC 7.2 7.5   HGB 15.9 14.1   HCT 45.3 40.6*    168       Recent Labs     11/23/22  0900 11/23/22  1553 11/24/22  0451    136 133*   K 4.1 4.0 4.2   CL 95* 97* 98   CO2 25 26 26   BUN 13 12 17   CREATININE 1.0 1.0 0.8   CALCIUM 9.3 8.9 8.4*   PHOS 2.6  --   --        Recent Labs     11/23/22  0900 11/24/22  0451   AST 43* 27   ALT 30 20   BILITOT 0.5 0.4   ALKPHOS 79 68       Recent Labs     11/23/22  1041   INR 1.04     No results for input(s): Randene Holes in the last 72 hours.   Lab Results   Component Value Date/Time    NITRU NEGATIVE 07/14/2022 10:30 PM    WBCUA 0-2

## 2022-11-24 NOTE — CONSULTS
[address] [place] [address]                                  CONSULTATION    PATIENT NAME: Mira Main                     :        1970  MED REC NO:   [id number]                           ROOM:       1332  ACCOUNT NO:   [de-identified]                           ADMIT DATE: 2022  PROVIDER:     Karishma Devlin. Usha Bethea M.D.    Nan Din2022    REASON FOR CONSULTATION:  Chest pain, elevated troponin in a 45year-old  gentleman, presented with recurrent chest pain over the last four to  five days. HISTORY OF PRESENT ILLNESS:  This is a 57-year-old  gentleman  with past medical history of diabetes, above-knee amputation on the  right, was in usual state of health until last , which is five  days ago, and started to have chest pain, retrosternal, pressure,  heaviness, like 7 to 8/10, radiating to the left arm associated with  some shortness of breath, diaphoresis, and coming on and going  frequently like lasts 20 to 30 minutes and resolves on its own, and has  been having it almost every day or several times a day, and associated  with nausea, has been having a cough productive of yellowish sputum,  with fever and chills. He has been having intermittent nausea and  vomiting. So, in view of that, he came to the emergency room and has  been evaluated and found to have COVID-19 related upper respiratory  infection and elevated troponin, so the patient was admitted. Now, he  is under isolation. Cardiology evaluation was sought in view of this  chest pain and troponin elevation. The patient has been having two  episodes of chest pain this morning, lasted 20 to 30 minutes and so  currently chest pain free. No previous history of chest pain. Per  review of the record, he had cardiac catheterization in 2018 at [facility], mid LAD 50%, RCA 30% lesion. Medical therapy was recommended  at that time.     REVIEW OF SYSTEMS:  Negative except the above-mentioned ones. PAST MEDICAL HISTORY:  Includes the following: Moderate nonobstructive  coronary artery disease per the cardiac catheterization in 2018. He has  COPD, diabetes, diabetic neuropathy, diabetic foot ulcer history,  hypertension, gastroesophageal reflux disease, hyperlipidemia. PAST SURGICAL HISTORY:  Abdominal surgery, above-knee amputation on the  right, colonoscopy, endoscopy, previous cardiac catheterization. FAMILY HISTORY:  Positive for coronary artery disease and his  grandfather had coronary artery disease. SOCIAL HISTORY:  He is a former smoker, quit in 2000. No alcohol. No  drug use. ALLERGIES:  ALLERGIC TO PENICILLIN, ACTOS, CLINDAMYCIN, VANCOMYCIN,  METFORMIN. HOME MEDICATIONS:  Prior to admission include the following:  Abilify; Lipitor; Jardiance; Lasix has been started on 11/03/2022, to be taken  once a day 20 mg for two weeks and _____ as needed for weight gain, no  history of leg swelling, and must have been completed by now. Gabapentin, insulin, levothyroxine, lisinopril, metoprolol,  multivitamin, Zoloft, and Trulicity. PHYSICAL EXAMINATION:  GENERAL:  Looks sick, in no form of distress. VITAL SIGNS:  Blood pressure 109/62, pulse rate 84, respiratory rate 18,  temperature 98. 3. HEENT:  Pink conjunctivae. Anicteric sclerae. Pupils are equal and  reactive bilaterally to light. NECK:  No lymphadenopathy. No goiter. No JVD. CHEST:  Clear to auscultation and resonant to percussion. CARDIOVASCULAR:  Arteries are felt; carotid, femoral, pedal.  No bruits. S1, S2 well heard. No murmur or gallop rhythm. ABDOMEN:  Soft, nontender, nondistended. Bowel sounds are positive. GENITALIA:  No CVA, flank or suprapubic tenderness. LOCOMOTOR:  No cyanosis, clubbing, muscle or joint tenderness. SKIN:  No rashes, ulcers or nodules. CNS:  Alert, awake, and oriented to time, person, and place. Cranial  nerves are intact. Sensation is intact to light touch and pain. Motor:  Normal muscle strength and tone. Appropriate mood and affect. WORKUP:  EKG:  Sinus rhythm, no acute EKG abnormality. Echocardiogram  done in 2020:  Ejection fraction of 60%. Stress test in 2020 was  negative. Cardiac catheterization in 2018 at [facility] with  nonobstructive lesion, mid LAD 50% and RCA 30%. Blood chemistry:   Sodium 133, potassium 4.3, BUN 17, creatinine 0.8. Troponin this  admission; 0.028, 0.015, 0.019, 0.016, trending down. Hematology:   White blood cells 7.4, hemoglobin 14, and platelets 554. Chest x-ray on  this admission showed no acute abnormality noted, and microbiologic  evaluation showed positive for COVID-19, was positive for COVID-19 in  01/2022, too. ASSESSMENT:  1. Basically, this overall is a 58-year-old gentleman, presented with  chest pain basically and cough and with fever, noted to have elevated  troponin and COVID-19 infection, and has been having recurrent chest  pain even after admission. 2.  The patient should be treated as acute coronary syndrome,  non-ST-elevation myocardial infarction in view of the troponin elevation  and recurrent chest pain. 3.  COVID-19, upper respiratory tract infection with cough, productive. 4.  History of cardiac catheterization in 2018 at [facility], mid LAD  50%, RCA 30%. 5.  Diabetes. 6.  Hyperlipidemia. 7.  Above-knee amputation on the right. 8.  Hypothyroidism. PLAN AND RECOMMENDATIONS:  Start on nitroglycerin in view of the  recurrent chest pain. He is on heparin already, continue heparin. Get  an echocardiogram.  The patient needs a cardiac catheterization, so we  will schedule him for cardiac catheterization. I discussed with the  patient the need for cardiac catheterization. Risks and benefits  explained and the patient verbalized understanding and agreed with plan  of care. Based on the course, we will gauge further care.     Thank you for allowing me to participate in the care of this patient. I  will follow up with you. Danny Maya.  Mayra Dumont M.D.    D: 11/24/2022 10:47:20       T: 11/24/2022 11:23:16     JOSE/AJITH  Job#: [id number]     Doc#: [id number]    CC:

## 2022-11-24 NOTE — FLOWSHEET NOTE
PT not visualized in room at this time   11/24/22 104   Safe Environment   Safety Measures   (VIrtual RN rounds complete)

## 2022-11-24 NOTE — PROGRESS NOTES
Patient had Incontinent Stool Episode and stool sample was collected. Patient cleaned up, bath, new linen's, and transferred to bedside chair with assistance.  Denies any further needs and will continue to monitor

## 2022-11-24 NOTE — CONSULTS
Chest pain- recurrent for the last 5 days  Elev troponin  NSTEMI  Hx of cath 2018 at [facility] mid lAD 50% and RCA 30%- med RX  COVID 19 infection/URTI  DMII  RT AKA  HLP  Hypothyroidism  Heart score 6      Plan  Start NTG drip  Echo  Cont heparin  Need cardiac cath  The risk and benefit of left heart cath has been explain in detail including but not limited to  Bleeding including retroperitoneal bleed 1%, infection, MI, CVA, ELDA, Limb loss, dissection, allergic reaction,death  Each of them 1 in 2000 range. The alternative managment has been explained. Patient expressed understanding of the risk and benefit and of the alternative managment well. Patient wanted and agreed to proceed with left heart cath. Hence we will schedule him for [facility]      [id number]           Lauro Hpoe MD       Conclusions      Summary   Normal left ventricle size and systolic function. Ejection fraction was   estimated at 60 %. There were no regional left ventricular wall motion   abnormalities and wall thickness was within normal limits. Doppler parameters were consistent with abnormal left ventricular   relaxation (grade 1 diastolic dysfunction). The pericardium was normal in appearance with no evidence of a pericardial   effusion.       Signature      ----------------------------------------------------------------   Electronically signed by Arturo Morales MD (Interpreting   physician) on 09/15/2020 at 05:55 PM   ----------------------------------------------------------------

## 2022-11-24 NOTE — PROGRESS NOTES
7:59 PM-Patient was C/O Diarrhea and requesting Imodium. 8:10 PM- Patient C/O ABD pain Mid-upper ABD pain that is going up to into his chest. With one finger will drawl a line from just above belly button to mid chest. Patient is denying Chest Pain/Heart pain and does not start in the chest starts in the ABD and Radiates up.   8:23 PM-This nurse spoke to Karen Jones on call provider and She ordered GI cocktail, Stool Sample and 500 ml IV bolus due to Lactic Acid levels elevated and re-check Lactic Acid level after infusion completed. Verbal education was provided to patient on the Plan of Care and patient stated verbal understanding.   Will continue to monitor

## 2022-11-25 ENCOUNTER — APPOINTMENT (OUTPATIENT)
Dept: CARDIAC CATH/INVASIVE PROCEDURES | Age: 52
DRG: 246 | End: 2022-11-25
Payer: MEDICARE

## 2022-11-25 LAB
ACTIVATED CLOTTING TIME: 335 SECONDS (ref 1–150)
GLUCOSE BLD-MCNC: 102 MG/DL (ref 70–108)
GLUCOSE BLD-MCNC: 103 MG/DL (ref 70–108)
GLUCOSE BLD-MCNC: 116 MG/DL (ref 70–108)
GLUCOSE BLD-MCNC: 132 MG/DL (ref 70–108)
GLUCOSE BLD-MCNC: 78 MG/DL (ref 70–108)
GLUCOSE BLD-MCNC: 99 MG/DL (ref 70–108)
HEPARIN UNFRACTIONATED: 0.24 U/ML (ref 0.3–0.7)
HEPARIN UNFRACTIONATED: 0.43 U/ML (ref 0.3–0.7)
HEPARIN UNFRACTIONATED: 0.46 U/ML (ref 0.3–0.7)
HEPARIN UNFRACTIONATED: 0.52 U/ML (ref 0.3–0.7)

## 2022-11-25 PROCEDURE — 2500000003 HC RX 250 WO HCPCS

## 2022-11-25 PROCEDURE — C1725 CATH, TRANSLUMIN NON-LASER: HCPCS

## 2022-11-25 PROCEDURE — 6370000000 HC RX 637 (ALT 250 FOR IP): Performed by: PHYSICIAN ASSISTANT

## 2022-11-25 PROCEDURE — 85520 HEPARIN ASSAY: CPT

## 2022-11-25 PROCEDURE — 99232 SBSQ HOSP IP/OBS MODERATE 35: CPT | Performed by: INTERNAL MEDICINE

## 2022-11-25 PROCEDURE — 6370000000 HC RX 637 (ALT 250 FOR IP)

## 2022-11-25 PROCEDURE — C1894 INTRO/SHEATH, NON-LASER: HCPCS

## 2022-11-25 PROCEDURE — 6370000000 HC RX 637 (ALT 250 FOR IP): Performed by: INTERNAL MEDICINE

## 2022-11-25 PROCEDURE — 93005 ELECTROCARDIOGRAM TRACING: CPT | Performed by: INTERNAL MEDICINE

## 2022-11-25 PROCEDURE — 6360000002 HC RX W HCPCS

## 2022-11-25 PROCEDURE — B2111ZZ FLUOROSCOPY OF MULTIPLE CORONARY ARTERIES USING LOW OSMOLAR CONTRAST: ICD-10-PCS | Performed by: INTERNAL MEDICINE

## 2022-11-25 PROCEDURE — 85347 COAGULATION TIME ACTIVATED: CPT

## 2022-11-25 PROCEDURE — 92928 PRQ TCAT PLMT NTRAC ST 1 LES: CPT | Performed by: INTERNAL MEDICINE

## 2022-11-25 PROCEDURE — 93454 CORONARY ARTERY ANGIO S&I: CPT

## 2022-11-25 PROCEDURE — C1874 STENT, COATED/COV W/DEL SYS: HCPCS

## 2022-11-25 PROCEDURE — C1887 CATHETER, GUIDING: HCPCS

## 2022-11-25 PROCEDURE — 82948 REAGENT STRIP/BLOOD GLUCOSE: CPT

## 2022-11-25 PROCEDURE — 6360000004 HC RX CONTRAST MEDICATION: Performed by: INTERNAL MEDICINE

## 2022-11-25 PROCEDURE — 92928 PRQ TCAT PLMT NTRAC ST 1 LES: CPT

## 2022-11-25 PROCEDURE — 99232 SBSQ HOSP IP/OBS MODERATE 35: CPT | Performed by: NURSE PRACTITIONER

## 2022-11-25 PROCEDURE — C1769 GUIDE WIRE: HCPCS

## 2022-11-25 PROCEDURE — 93306 TTE W/DOPPLER COMPLETE: CPT

## 2022-11-25 PROCEDURE — 2580000003 HC RX 258: Performed by: PHYSICIAN ASSISTANT

## 2022-11-25 PROCEDURE — 1200000003 HC TELEMETRY R&B

## 2022-11-25 PROCEDURE — 36415 COLL VENOUS BLD VENIPUNCTURE: CPT

## 2022-11-25 PROCEDURE — 93454 CORONARY ARTERY ANGIO S&I: CPT | Performed by: INTERNAL MEDICINE

## 2022-11-25 PROCEDURE — 4A023N7 MEASUREMENT OF CARDIAC SAMPLING AND PRESSURE, LEFT HEART, PERCUTANEOUS APPROACH: ICD-10-PCS | Performed by: INTERNAL MEDICINE

## 2022-11-25 PROCEDURE — 027034Z DILATION OF CORONARY ARTERY, ONE ARTERY WITH DRUG-ELUTING INTRALUMINAL DEVICE, PERCUTANEOUS APPROACH: ICD-10-PCS | Performed by: INTERNAL MEDICINE

## 2022-11-25 RX ORDER — SODIUM CHLORIDE 0.9 % (FLUSH) 0.9 %
5-40 SYRINGE (ML) INJECTION PRN
Status: DISCONTINUED | OUTPATIENT
Start: 2022-11-25 | End: 2022-11-26 | Stop reason: HOSPADM

## 2022-11-25 RX ORDER — SODIUM CHLORIDE 0.9 % (FLUSH) 0.9 %
5-40 SYRINGE (ML) INJECTION EVERY 12 HOURS SCHEDULED
Status: DISCONTINUED | OUTPATIENT
Start: 2022-11-25 | End: 2022-11-26 | Stop reason: HOSPADM

## 2022-11-25 RX ORDER — ASPIRIN 81 MG/1
81 TABLET, CHEWABLE ORAL DAILY
Status: DISCONTINUED | OUTPATIENT
Start: 2022-11-26 | End: 2022-11-26 | Stop reason: HOSPADM

## 2022-11-25 RX ORDER — ACETAMINOPHEN 325 MG/1
650 TABLET ORAL EVERY 4 HOURS PRN
Status: DISCONTINUED | OUTPATIENT
Start: 2022-11-25 | End: 2022-11-26 | Stop reason: HOSPADM

## 2022-11-25 RX ORDER — SODIUM CHLORIDE 9 MG/ML
INJECTION, SOLUTION INTRAVENOUS PRN
Status: DISCONTINUED | OUTPATIENT
Start: 2022-11-25 | End: 2022-11-26 | Stop reason: HOSPADM

## 2022-11-25 RX ADMIN — ACETAMINOPHEN 650 MG: 325 TABLET ORAL at 23:18

## 2022-11-25 RX ADMIN — Medication 10 ML: at 22:20

## 2022-11-25 RX ADMIN — ONDANSETRON 4 MG: 4 TABLET, ORALLY DISINTEGRATING ORAL at 23:25

## 2022-11-25 RX ADMIN — GABAPENTIN 300 MG: 300 CAPSULE ORAL at 22:19

## 2022-11-25 RX ADMIN — GABAPENTIN 300 MG: 300 CAPSULE ORAL at 09:44

## 2022-11-25 RX ADMIN — HEPARIN SODIUM 2000 UNITS: 1000 INJECTION, SOLUTION INTRAVENOUS; SUBCUTANEOUS at 01:53

## 2022-11-25 RX ADMIN — METOPROLOL TARTRATE 50 MG: 50 TABLET, FILM COATED ORAL at 23:14

## 2022-11-25 RX ADMIN — SODIUM CHLORIDE, POTASSIUM CHLORIDE, SODIUM LACTATE AND CALCIUM CHLORIDE: 600; 310; 30; 20 INJECTION, SOLUTION INTRAVENOUS at 09:42

## 2022-11-25 RX ADMIN — SERTRALINE 100 MG: 100 TABLET, FILM COATED ORAL at 09:44

## 2022-11-25 RX ADMIN — LISINOPRIL 10 MG: 10 TABLET ORAL at 09:44

## 2022-11-25 RX ADMIN — ASPIRIN 324 MG: 81 TABLET, COATED ORAL at 09:49

## 2022-11-25 RX ADMIN — Medication 50 MG: at 09:45

## 2022-11-25 RX ADMIN — IOPAMIDOL 60 ML: 755 INJECTION, SOLUTION INTRAVENOUS at 18:54

## 2022-11-25 RX ADMIN — HEPARIN SODIUM 17 UNITS/KG/HR: 10000 INJECTION, SOLUTION INTRAVENOUS at 14:02

## 2022-11-25 RX ADMIN — HEPARIN SODIUM 17 UNITS/KG/HR: 10000 INJECTION, SOLUTION INTRAVENOUS at 01:55

## 2022-11-25 RX ADMIN — METOPROLOL TARTRATE 50 MG: 50 TABLET, FILM COATED ORAL at 09:44

## 2022-11-25 RX ADMIN — ARIPIPRAZOLE 5 MG: 5 TABLET ORAL at 09:44

## 2022-11-25 RX ADMIN — ATORVASTATIN CALCIUM 80 MG: 80 TABLET, FILM COATED ORAL at 22:19

## 2022-11-25 RX ADMIN — BENZONATATE 200 MG: 100 CAPSULE ORAL at 09:44

## 2022-11-25 RX ADMIN — OXYCODONE HYDROCHLORIDE AND ACETAMINOPHEN 500 MG: 500 TABLET ORAL at 09:44

## 2022-11-25 RX ADMIN — Medication 1000 UNITS: at 09:44

## 2022-11-25 ASSESSMENT — PAIN SCALES - GENERAL
PAINLEVEL_OUTOF10: 9
PAINLEVEL_OUTOF10: 6

## 2022-11-25 ASSESSMENT — PAIN DESCRIPTION - LOCATION: LOCATION: HEAD

## 2022-11-25 ASSESSMENT — PAIN DESCRIPTION - DESCRIPTORS: DESCRIPTORS: ACHING

## 2022-11-25 NOTE — FLOWSHEET NOTE
11/25/22 1143   Safe Environment   Safety Measures Other (comment)  (VN safety round)   VN called into patients room and introduced myself and role. Patient answered and permitted video. Video activated. . Patient resting comfortably in bed. . Patient voiced no needs or concerns at this time. Call light within reach.

## 2022-11-25 NOTE — H&P
[address] [address]  Sedation/Analgesia History & Physical    Pt Name: Paramjit Bacon  Account number: [de-identified]  MRN: [id number]  YOB: 1970  Provider Performing Procedure: Ciera Estes MD MD  Referring Provider: Maria Luisa Yanez MD   Primary Care Physician: Jose Catherine MD  Date: 11/25/2022    PRE-PROCEDURE    Code Status: FULL CODE  Brief History/Pre-Procedure Diagnosis:   NSTEMI  COVID+    Consent: : I have discussed with the patient risks, benefits, and alternatives (and relevant risks, benefits, and side effects related to alternatives or not receiving care), and likelihood of the success. The patient and/or representative appear to understand and agree to proceed. The discussion encompasses risks, benefits, and side effects related to the alternatives and the risks related to not receiving the proposed care, treatment, and services. The indication, risks and benefits of the procedure and possible therapeutic consequences and alternatives were discussed with the patient. The patient was given the opportunity to ask questions and to have them answered to his/her satisfaction. Risks of the procedure include but are not limited to the following: Bleeding, hematoma including retroperitoneal hemmorhage, infection, pain and discomfort, injury to the aorta and other blood vessels, rhythm disturbance, low blood pressure, myocardial infarction, stroke, kidney damage/failure, myocardial perforation, allergic reactions to sedatives/contrast material, loss of pulse/vascular compromise requiring surgery, aneurysm/pseudoaneurysm formation, possible loss of a limb/hand/leg, needing blood transfusion, requiring emergent open heart surgery or emergent coronary intervention, the need for intubation/respiratory support, the requirement for defibrillation/cardioversion, and death. Alternatives to and omission of the suggested procedure were discussed.  The patient had no further questions and wished to proceed; the consent form was signed. MEDICAL HISTORY   has a past medical history of Atherosclerotic heart disease of native coronary artery with unspecified angina pectoris, Bipolar 2 disorder ([address] BPH (benign prostatic hyperplasia), COPD (chronic obstructive pulmonary disease) ([address] Diabetes mellitus type 2, uncontrolled, Diabetic peripheral neuropathy ([address] Diabetic polyneuropathy ([address] Diabetic ulcer of right foot associated with type 2 diabetes mellitus ([address] Essential hypertension, GERD (gastroesophageal reflux disease), Hammer toe of left foot, Heart murmur, History of tobacco abuse, Hx of AKA (above knee amputation), right ([address] Hyperlipidemia, Macular edema, diabetic, bilateral ([address] Marijuana abuse in remission, Melena, MRSA (methicillin resistant staph aureus) culture positive, Onychomycosis, Other disorders of kidney and ureter in diseases classified elsewhere, Sleep apnea, Thyroid disease, and 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 SURGICAL HISTORY   has a past surgical history that includes other surgical history (Right, 1/14/15); Abscess Drainage (Right); Toe amputation (Right, 1/16/15); Foot Debridement (Right, 07/01/2016); Leg amputation below knee (Right, 07/20/2016); [place] tooth extraction (?when); pr drain infect shoulder bursa (Left, 8/18/2017); pr office/outpt visit,procedure only (Right, 9/20/2018); incision and drainage (Right, 2/18/2019); Leg amputation below knee (Right, 4/4/2019); AMPUTATION ABOVE KNEE (Right, 4/18/2019); Upper gastrointestinal endoscopy (N/A, 3/3/2020); Cholecystectomy, laparoscopic (N/A, 4/1/2020); Endoscopy, colon, diagnostic; Cystoscopy (N/A, 7/20/2020); Cystoscopy (N/A, 8/21/2020); Gastrocnemius Recession (Left, 11/12/2021); Abdomen surgery; Colonoscopy; Dilatation, esophagus; and Upper gastrointestinal endoscopy (N/A, 6/15/2022).   Additional information:       ALLERGIES   Allergies as of 11/23/2022 - Fully Reviewed 11/23/2022   Allergen Reaction Noted    Pcn [penicillins] Shortness Of Breath, Nausea And Vomiting, and Other (See Comments) 11/15/2014    Actos [pioglitazone] Swelling 06/11/2020    Clindamycin/lincomycin Itching 09/29/2015    Vancomycin Hives 05/13/2020    Metformin and related Swelling 05/14/2021     Additional information:       MEDICATIONS     Current Facility-Administered Medications:     nitroGLYCERIN 50 mg in dextrose 5% 250 mL infusion, 5 mcg/min, IntraVENous, Continuous, Jaspreet Yepez MD, Held at 11/24/22 1252    aspirin EC tablet 324 mg, 324 mg, Oral, Daily, Joana Bernardo MD, 324 mg at 11/24/22 0837    heparin (porcine) injection 4,000 Units, 4,000 Units, IntraVENous, PRN, Joana Bernardo MD    heparin (porcine) injection 2,000 Units, 2,000 Units, IntraVENous, PRN, Joana Bernardo MD, 2,000 Units at 11/25/22 0153    heparin 25,000 units in dextrose 5% 250 mL (premix) infusion, 5-30 Units/kg/hr, IntraVENous, Continuous, Joana Bernardo MD, Last Rate: 17.4 mL/hr at 11/25/22 0155, 17 Units/kg/hr at 11/25/22 0155    sodium chloride flush 0.9 % injection 10 mL, 10 mL, IntraVENous, 2 times per day, CINDY Childers    sodium chloride flush 0.9 % injection 10 mL, 10 mL, IntraVENous, PRN, CINDY Childers    0.9 % sodium chloride infusion, , IntraVENous, PRN, CINDY Childers    ondansetron (ZOFRAN-ODT) disintegrating tablet 4 mg, 4 mg, Oral, Q8H PRN, 4 mg at 11/23/22 2018 **OR** ondansetron (ZOFRAN) injection 4 mg, 4 mg, IntraVENous, Q6H PRN, CINDY Childers    polyethylene glycol (GLYCOLAX) packet 17 g, 17 g, Oral, Daily PRN, CINDY Childers    acetaminophen (TYLENOL) tablet 650 mg, 650 mg, Oral, Q6H PRN, 650 mg at 11/23/22 9301 **OR** acetaminophen (TYLENOL) suppository 650 mg, 650 mg, Rectal, Q6H PRN, CINDY Childers    potassium chloride (KLOR-CON M) extended release tablet 40 mEq, 40 mEq, Oral, PRN **OR** potassium bicarb-citric acid (EFFER-K) effervescent tablet 40 mEq, 40 mEq, Oral, PRN **OR** potassium chloride 10 mEq/100 mL IVPB (Peripheral Line), 10 mEq, IntraVENous, PRN, CINDY Mills    magnesium sulfate 2000 mg in 50 mL IVPB premix, 2,000 mg, IntraVENous, PRN, CINDY Mills    ARIPiprazole (ABILIFY) tablet 5 mg, 5 mg, Oral, Daily, CINDY Mills, 5 mg at 11/24/22 0830    atorvastatin (LIPITOR) tablet 80 mg, 80 mg, Oral, Nightly, CINDY Mills, 80 mg at 11/24/22 2050    levothyroxine (SYNTHROID) tablet 50 mcg, 50 mcg, Oral, Daily, CINDY Mills, 50 mcg at 11/24/22 9359    sertraline (ZOLOFT) tablet 100 mg, 100 mg, Oral, Daily, CINDY Mills, 100 mg at 11/24/22 0830    metoprolol tartrate (LOPRESSOR) tablet 50 mg, 50 mg, Oral, BID, CINDY Mills, 50 mg at 11/24/22 2050    lisinopril (PRINIVIL;ZESTRIL) tablet 10 mg, 10 mg, Oral, Daily, CINDY Mills, 10 mg at 11/24/22 0830    nitroGLYCERIN (NITROSTAT) SL tablet 0.4 mg, 0.4 mg, SubLINGual, Q5 Min PRN, CINDY Mills    gabapentin (NEURONTIN) capsule 300 mg, 300 mg, Oral, TID, CINDY Mills, 300 mg at 11/24/22 2050    glucose chewable tablet 16 g, 4 tablet, Oral, PRN, CINDY Mills    dextrose bolus 10% 125 mL, 125 mL, IntraVENous, PRN **OR** dextrose bolus 10% 250 mL, 250 mL, IntraVENous, PRN, CINDY Mills    glucagon (rDNA) injection 1 mg, 1 mg, SubCUTAneous, PRN, CINDY Mills    dextrose 10 % infusion, , IntraVENous, Continuous PRN, CINDY Mills    insulin lispro (HUMALOG) injection vial 20 Units, 20 Units, SubCUTAneous, TID WC, CINDY Mills, 20 Units at 11/24/22 0831    ascorbic acid (VITAMIN C) tablet 500 mg, 500 mg, Oral, Daily, CINDY Mills, 500 mg at 11/24/22 0830    zinc sulfate (ZINCATE) capsule 50 mg, 50 mg, Oral, Daily, CINDY Mills, 50 mg at 11/24/22 0830    Vitamin D (CHOLECALCIFEROL) tablet 1,000 Units, 1,000 Units, Oral, Daily, CINDY Mills, 1,000 Units at 11/24/22 0830    benzonatate (TESSALON) capsule 200 mg, 200 mg, Oral, TID PRN, CINDY Mills    lactated ringers infusion, , IntraVENous, Continuous, CINDY Mills, Last Rate: 75 mL/hr at 11/24/22 2048, New Bag at 11/24/22 2048    insulin glargine (LANTUS) injection vial 50 Units, 50 Units, SubCUTAneous, Nightly, Guilherme Perdomoma, 50 Units at 11/24/22 2049    insulin lispro (HUMALOG) injection vial 0-8 Units, 0-8 Units, SubCUTAneous, TID WC, CINDY Perdomo, 2 Units at 11/23/22 1752    insulin lispro (HUMALOG) injection vial 0-4 Units, 0-4 Units, SubCUTAneous, Nightly, ICNDY Perdomo  Prior to Admission medications    Medication Sig Start Date End Date Taking? Authorizing Provider   furosemide (LASIX) 20 MG tablet Take one daily x 2 weeks then as needed for weight gain 11/3/22   Phuc Ortega MD   insulin lispro (HUMALOG KWIKPEN U-200) 200 UNIT/ML SOPN pen 40 units Plus scale before meals; only takes if BG >200 - Max dose 160 units per day  Patient taking differently: 40 units Plus scale before meals; only takes if BG >200 - Max dose 160 units per day     States not doing sliding scale 10/10/22   Phuc Ortega MD   atorvastatin (LIPITOR) 40 MG tablet TAKE ONE TABLET BY MOUTH EVERY NIGHT 10/3/22   Phuc Ortega MD   metoprolol tartrate (LOPRESSOR) 50 MG tablet TAKE 1 TABLET BY MOUTH 2 TIMES DAILY. 10/3/22   Phuc Ortega MD   TRULICITY 4.5 XP/6.3PA SOPN INJECT 4.5 MG INTO THE SKIN ONCE A WEEK . 10/3/22   Phuc Ortega MD   levothyroxine (SYNTHROID) 50 MCG tablet TAKE 1 TABLET BY MOUTH ONCE DAILY.  10/3/22   Phuc Ortega MD   Multiple Vitamins-Minerals (EYE VITAMINS PO) Take 1 tablet by mouth daily    Historical Provider, MD   Omega-3 Fatty Acids (FISH OIL PO) Take 1 caplet by mouth daily    Historical Provider, MD   Probiotic Product (PROBIOTIC PO) Take 1 tablet by mouth daily    Historical Provider, MD   lisinopril (PRINIVIL;ZESTRIL) 10 MG tablet Take 1 tablet by mouth daily 8/29/22   Thong Mosher MD   Continuous Blood Gluc Sensor (DEXCOM G6 SENSOR) MISC 1 Device by Does not apply route every 14 days 8/29/22   Thong Mosher MD   Continuous Blood Gluc Transmit (DEXCOM G6 TRANSMITTER) MISC 1 Device by Does not apply route every 3 months 8/29/22   Phuc Ortega MD   empagliflozin (JARDIANCE) 25 MG tablet TAKE 1 TABLET BY MOUTH ONCE DAILY. 7/14/22   Phuc Ortega MD   gabapentin (NEURONTIN) 400 MG capsule Change to 400 mg TID  Patient taking differently: 600 mg. Change to 600mg TID 7/14/22 11/23/22  Phuc Ortega MD   Insulin Glargine, 2 Unit Dial, 300 UNIT/ML SOPN Change to 85 U in am , 85 U PM 6/27/22   Phuc Ortega MD   nitroGLYCERIN (NITROSTAT) 0.3 MG SL tablet Place 1 tablet under the tongue every 5 minutes as needed for Chest pain up to max of 3 total doses.  If no relief after 1 dose, call 911. 4/26/22   Osmani Avila MD   Insulin Pen Needle (TRUEPLUS PEN NEEDLES) 31G X 8 MM MISC USE AS DIRECTED 3/21/22   Phuc Ortega MD   sertraline (ZOLOFT) 100 MG tablet Take 100 mg by mouth daily  1/31/22   Historical Provider, MD   Continuous Blood Gluc  (DEXCOM G6 ) LIANNE 1 Device by Does not apply route 4 times daily (before meals and nightly) 3/22/21   MARILYN Owen - CNP   ARIPiprazole (ABILIFY) 5 MG tablet Take 5 mg by mouth daily  12/16/20   Historical Provider, MD     Additional information:       VITAL SIGNS   Vitals:    11/25/22 0328   BP: (!) 112/59   Pulse: 76   Resp: 16   Temp: 97.6 °F (36.4 °C)   SpO2: 93%       PHYSICAL:   General: No acute distress  HEENT:  Unremarkable for age  Neck: without increased JVD, carotid pulses 2+ bilaterally without bruits  Heart: RRR, S1 & S2 WNL, S4 gallop, without murmurs or rubs   NYHA: 2  Lungs: Clear to auscultation    Abdomen: BS present, without HSM, masses, or tenderness    Extremities: without C,C,E.  Pulses 2+ bilaterally  Mental Status: Alert & Oriented        PLANNED PROCEDURE   [x]Cath  [x]PCI                []Pacemaker/AICD  []MONIKA             []Cardioversion []Peripheral angiography/PTA  []Other:      SEDATION  Planned agent:[x]Midazolam []Meperidine [x]Sublimaze []Morphine  []Diazepam  []Other:     ASA Classification:  []1 []2 [x]3 []4 []5  Class 1: A normal healthy patient  Class 2: Pt with mild to moderate systemic disease  Class 3: Severe systemic disease or disturbance  Class 4: Severe systemic disorders that are already life threatening. Class 5: Moribund pt with little chances of survival, for more than 24 hours. Mallampati I Airway Classification:   []1 []2 [x]3 []4    [x]Pre-procedure diagnostic studies complete and results available. Comment:    [x]Previous sedation/anesthesia experiences assessed. Comment:    [x]The patient is an appropriate candidate to undergo the planned procedure sedation and anesthesia. (Refer to nursing sedation/analgesia documentation record)  [x]Formulation and discussion of sedation/procedure plan, risks, and expectations with patient and/or responsible adult completed. [x]Patient examined immediately prior to the procedure.  (Refer to nursing sedation/analgesia documentation record)    Gilda Gaytan MD MD   Electronically signed 11/25/2022 at 7:30 AM

## 2022-11-25 NOTE — PROGRESS NOTES
Cardiology Progress Note      Patient:  Marta Sharma  YOB: 1970  MRN: [id number]   Acct: [de-identified]  Admit Date:  11/23/2022  Primary Cardiologist: none  Rounding Cardiologist: Deondre Leblanc MD    Rationale for Cardiology consult: Chest pain, elevated troponin     HPI/PMH: Per Dr. Neli Montano consult on 11/24/22:  Chest pain, elevated troponin in a 45year-old  gentleman, presented with recurrent chest pain over the last four to five days. HISTORY OF PRESENT ILLNESS:  This is a 58-year-old  gentleman  with past medical history of diabetes, above-knee amputation on the  right, was in usual state of health until last Sunday, which is five  days ago, and started to have chest pain, retrosternal, pressure,  heaviness, like 7 to 8/10, radiating to the left arm associated with  some shortness of breath, diaphoresis, and coming on and going  frequently like lasts 20 to 30 minutes and resolves on its own, and has  been having it almost every day or several times a day, and associated  with nausea, has been having a cough productive of yellowish sputum,  with fever and chills. He has been having intermittent nausea and  vomiting. So, in view of that, he came to the emergency room and has  been evaluated and found to have COVID-19 related upper respiratory  infection and elevated troponin, so the patient was admitted. Now, he  is under isolation. Cardiology evaluation was sought in view of this  chest pain and troponin elevation. The patient has been having two  episodes of chest pain this morning, lasted 20 to 30 minutes and so  currently chest pain free. No previous history of chest pain. Per  review of the record, he had cardiac catheterization in 2018 at [facility], mid LAD 50%, RCA 30% lesion. Medical therapy was recommended  at that time. Chief Complaint: \"Still have that constant feeling of light chest pressure\". Subjective (Events in last 24 hours):    Stable OVN  Remains in COVID 19 isolation  Continues with harsh cough  Notes constant upper mid chest pressure - always there just little bit - maybe worsens with coughing  Does not want Ntg for the discomfort  Denies dyspnea - just aggravated by constant coughing  No lightheadedness or dyspnea    Objective:   /66   Pulse 80   Temp 98 °F (36.7 °C) (Oral)   Resp 16   Ht 5' 6\" (1.676 m)   Wt 231 lb (104.8 kg)   SpO2 99%   BMI 37.28 kg/m²        TELEMETRY: no tele    Physical Exam:  General Appearance: alert and oriented to person, place and time, in no acute distress, resting in bed  Cardiovascular: normal rate, regular rhythm, normal S1 and S2, no murmurs, rubs, clicks, or gallops, distal pulses intact, no JVD  Pulmonary/Chest: clear to auscultation bilaterally- no wheezes, rales or rhonchi, normal air movement, no respiratory distress, harsh productive cough of white phlegm  Abdomen: Obese, soft, non-tender, non-distended, normal bowel sounds  Extremities: no cyanosis or edema, pulses 2+  Skin: warm and dry, no rash or erythema  Head: normocephalic and atraumatic  Eyes: pupils equal, round, and reactive to light  Neck: supple and non-tender without mass, no thyromegaly   Musculoskeletal: normal range of motion, no joint swelling, deformity or tenderness  Neurological: alert, oriented, normal speech, no focal findings or movement disorder noted    Medications:    aspirin  324 mg Oral Daily    sodium chloride flush  10 mL IntraVENous 2 times per day    ARIPiprazole  5 mg Oral Daily    atorvastatin  80 mg Oral Nightly    levothyroxine  50 mcg Oral Daily    sertraline  100 mg Oral Daily    metoprolol tartrate  50 mg Oral BID    lisinopril  10 mg Oral Daily    gabapentin  300 mg Oral TID    insulin lispro  20 Units SubCUTAneous TID WC    ascorbic acid  500 mg Oral Daily    zinc sulfate  50 mg Oral Daily    Vitamin D  1,000 Units Oral Daily    insulin glargine  50 Units SubCUTAneous Nightly    insulin lispro  0-8 Units SubCUTAneous TID WC    insulin lispro  0-4 Units SubCUTAneous Nightly      nitroGLYCERIN Stopped (22 1252)    heparin (PORCINE) Infusion 17 Units/kg/hr (22 0155)    sodium chloride      dextrose      lactated ringers 75 mL/hr at 22 0942     heparin (porcine), 4,000 Units, PRN  heparin (porcine), 2,000 Units, PRN  sodium chloride flush, 10 mL, PRN  sodium chloride, , PRN  ondansetron, 4 mg, Q8H PRN   Or  ondansetron, 4 mg, Q6H PRN  polyethylene glycol, 17 g, Daily PRN  acetaminophen, 650 mg, Q6H PRN   Or  acetaminophen, 650 mg, Q6H PRN  potassium chloride, 40 mEq, PRN   Or  potassium alternative oral replacement, 40 mEq, PRN   Or  potassium chloride, 10 mEq, PRN  magnesium sulfate, 2,000 mg, PRN  nitroGLYCERIN, 0.4 mg, Q5 Min PRN  glucose, 4 tablet, PRN  dextrose bolus, 125 mL, PRN   Or  dextrose bolus, 250 mL, PRN  glucagon (rDNA), 1 mg, PRN  dextrose, , Continuous PRN  benzonatate, 200 mg, TID PRN      Diagnostics:  EK22  EKG 12 lead  Order: [id number]  Status: Final result    Visible to patient: Yes (not seen)    Next appt: 2022 at 11:00 AM in Internal Medicine Jeramie Manrique MD)    0 Result Notes  Component Ref Range & Units 22 1345 22 0846 22 1715 22 2119 22 0100 10/29/21 1638 21 0741   Ventricular Rate BPM 99  106  100  86  119  100  98    Atrial Rate BPM 99  106  100  86  119   98    P-R Interval ms 176  188  180  168  172   168    QRS Duration ms 76  76  76  80  74  74  78    Q-T Interval ms 360  342  348  350  318  342  350    QTc Calculation (Bazett) ms 462  454  448  418  447  441  446    P Axis degrees 22  52  34  35  47   -4    R Axis degrees 5  20  6  10  12  -24  8    T Axis degrees 38  48  49  61  53  -93  39    Resulting Agency  [address] [facility] [address] [facility] [address] [facility] [address] [facility] [address] [facility] M[facility] [address] [facility] MUSE [address] [facility] STR MUSE           Narrative & Impression    Normal sinus rhythm  Normal ECG  When compared with ECG of 2022 08:46,  No significant change was found  Confirmed by [facility] MD, [id number] [facility] ([id number]) on 11/24/2022 4:01:45 PM             Echo: 9/15/2020  Summary   Normal left ventricle size and systolic function. Ejection fraction was   estimated at 60 %. There were no regional left ventricular wall motion   abnormalities and wall thickness was within normal limits. Doppler parameters were consistent with abnormal left ventricular   relaxation (grade 1 diastolic dysfunction). The pericardium was normal in appearance with no evidence of a pericardial   effusion. Signature    ----------------------------------------------------------------   Electronically signed by Mushtaq Wallace MD (Interpreting   physician) on 09/15/2020 at 05:55 PM      Stress: 6/2020; negative  Summary   Lexiscan EKG stress test is not suggestive for ischemia. The nuclear images is not suggestive for myocardial ischemia. Signatures    ----------------------------------------------------------------   Electronically signed by Mushtaq Wallace MD (Interpreting   Cardiologist) on 06/19/2020 at 18:21    Left Heart Cath: pending    Lab Data:    Cardiac Enzymes:  No results for input(s): CKTOTAL, CKMB, CKMBINDEX, TROPONINI in the last 72 hours.     CBC:   Lab Results   Component Value Date/Time    WBC 7.5 11/24/2022 04:51 AM    RBC 4.48 11/24/2022 04:51 AM    HGB 14.1 11/24/2022 04:51 AM    HCT 40.6 11/24/2022 04:51 AM     11/24/2022 04:51 AM       CMP:    Lab Results   Component Value Date/Time     11/24/2022 04:51 AM    K 4.2 11/24/2022 04:51 AM    K 4.0 11/23/2022 03:53 PM    CL 98 11/24/2022 04:51 AM    CO2 26 11/24/2022 04:51 AM    BUN 17 11/24/2022 04:51 AM    CREATININE 0.8 11/24/2022 04:51 AM    GFRAA >60 08/24/2016 05:54 PM    LABGLOM >60 11/24/2022 01:12 AM    GLUCOSE 125 11/24/2022 04:51 AM    GLUCOSE 114 12/26/2020 09:39 AM    CALCIUM 8.4 11/24/2022 04:51 AM       Hepatic Function Panel:    Lab Results   Component Value Date/Time    ALKPHOS 68 11/24/2022 04:51 AM    ALT 20 11/24/2022 04:51 AM    AST 27 11/24/2022 04:51 AM    PROT 6.1 11/24/2022 04:51 AM    BILITOT 0.4 11/24/2022 04:51 AM    BILIDIR <0.2 07/14/2022 02:30 PM    IBILI 0.1 03/02/2015 11:30 AM    LABALBU 3.6 11/24/2022 04:51 AM       Magnesium:    Lab Results   Component Value Date/Time    MG 2.2 11/23/2022 09:00 AM       PT/INR:    Lab Results   Component Value Date/Time    PROTIME 12.0 12/26/2020 09:39 AM    PROTIME 12.2 01/03/2019 12:00 AM    INR 1.04 11/23/2022 10:41 AM       HgBA1c:    Lab Results   Component Value Date/Time    LABA1C 7.8 09/15/2022 09:06 AM       FLP:    Lab Results   Component Value Date/Time    TRIG 192 10/29/2021 04:30 PM    HDL 37 10/29/2021 04:30 PM    LDLCALC 92 10/29/2021 04:30 PM    LDLDIRECT 102.12 02/26/2021 10:53 AM       TSH:    Lab Results   Component Value Date/Time    TSH 2.480 11/23/2022 09:00 AM         Assessment/Plan:  [place]/NSTEMI  Chest pain on presentation - recurrent after admit  Continues with constant light chest pressure upper chest; no radiation; no accompanying sx; possibly worse with coughing  Down-trending troponin: 0.028,  0.015, 0.019, 0.016  No acute EKG changes  Echo 2020: EF 50%  Repeat echo 11/23/22; results pending  Orlin Gan stress 2020; neg for ischemia  Cath [facility] 2018: LAD 50%. RCA 30%  GDMT:   ASA, BB, Statin, ACEi  For cath today  COVID 19 infection  Cough and fever on admit  HLD  DM  Hypothyroidism  Right AKA       Chest discomfort atypical at this time. Cath pending. Await results. Echo results pending. If cath and echo without significant findings then cardiology will follow on prn basis. See Dr. Harmeet Arteaga in office in follow-up in 3 - 4 weeks. If significant findings; plan of care will be adjusted accordingly.         Electronically signed by MARILYN Goncalves CNP on 11/25/2022 at 12:24 PM

## 2022-11-25 NOTE — PLAN OF CARE
Problem: Discharge Planning  Goal: Discharge to home or other facility with appropriate resources  Outcome: Progressing  Flowsheets (Taken 11/24/2022 2050)  Discharge to home or other facility with appropriate resources:   Identify barriers to discharge with patient and caregiver   Arrange for needed discharge resources and transportation as appropriate   Identify discharge learning needs (meds, wound care, etc)     Problem: Pain  Goal: Verbalizes/displays adequate comfort level or baseline comfort level  Outcome: Progressing  Flowsheets (Taken 11/24/2022 2050)  Verbalizes/displays adequate comfort level or baseline comfort level:   Encourage patient to monitor pain and request assistance   Assess pain using appropriate pain scale   Administer analgesics based on type and severity of pain and evaluate response     Problem: Skin/Tissue Integrity  Goal: Absence of new skin breakdown  Description: 1. Monitor for areas of redness and/or skin breakdown  2. Assess vascular access sites hourly  3. Every 4-6 hours minimum:  Change oxygen saturation probe site  4. Every 4-6 hours:  If on nasal continuous positive airway pressure, respiratory therapy assess nares and determine need for appliance change or resting period.   Outcome: Progressing     Problem: Safety - Adult  Goal: Free from fall injury  Outcome: Progressing  Flowsheets (Taken 11/24/2022 2050)  Free From Fall Injury: Instruct family/caregiver on patient safety     Problem: ABCDS Injury Assessment  Goal: Absence of physical injury  Outcome: Progressing  Flowsheets (Taken 11/24/2022 2050)  Absence of Physical Injury: Implement safety measures based on patient assessment     Problem: Metabolic/Fluid and Electrolytes - Adult  Goal: Electrolytes maintained within normal limits  Outcome: Progressing  Flowsheets (Taken 11/24/2022 2050)  Electrolytes maintained within normal limits:   Monitor labs and assess patient for signs and symptoms of electrolyte imbalances Administer electrolyte replacement as ordered     Problem: Metabolic/Fluid and Electrolytes - Adult  Goal: Hemodynamic stability and optimal renal function maintained  Outcome: Progressing  Flowsheets (Taken 11/24/2022 2050)  Hemodynamic stability and optimal renal function maintained:   Monitor labs and assess for signs and symptoms of volume excess or deficit   Monitor intake, output and patient weight     Problem: Metabolic/Fluid and Electrolytes - Adult  Goal: Glucose maintained within prescribed range  Outcome: Progressing  Flowsheets (Taken 11/24/2022 2050)  Glucose maintained within prescribed range:   Monitor blood glucose as ordered   Assess for signs and symptoms of hyperglycemia and hypoglycemia   Administer ordered medications to maintain glucose within target range     Problem: Chronic Conditions and Co-morbidities  Goal: Patient's chronic conditions and co-morbidity symptoms are monitored and maintained or improved  Outcome: Progressing  Flowsheets (Taken 11/24/2022 2050)  Care Plan - Patient's Chronic Conditions and Co-Morbidity Symptoms are Monitored and Maintained or Improved:   Monitor and assess patient's chronic conditions and comorbid symptoms for stability, deterioration, or improvement   Collaborate with multidisciplinary team to address chronic and comorbid conditions and prevent exacerbation or deterioration   Update acute care plan with appropriate goals if chronic or comorbid symptoms are exacerbated and prevent overall improvement and discharge

## 2022-11-25 NOTE — PLAN OF CARE
Problem: Discharge Planning  Goal: Discharge to home or other facility with appropriate resources  11/25/2022 0129 by Perri Beth RN  Outcome: Progressing     Problem: Pain  Goal: Verbalizes/displays adequate comfort level or baseline comfort level  11/25/2022 0129 by Perri Beth RN  Outcome: Progressing     Problem: Skin/Tissue Integrity  Goal: Absence of new skin breakdown  Description: 1. Monitor for areas of redness and/or skin breakdown  2. Assess vascular access sites hourly  3. Every 4-6 hours minimum:  Change oxygen saturation probe site  4. Every 4-6 hours:  If on nasal continuous positive airway pressure, respiratory therapy assess nares and determine need for appliance change or resting period.   11/25/2022 0129 by Perri Beth RN  Outcome: Progressing     Problem: Safety - Adult  Goal: Free from fall injury  11/25/2022 0129 by Perri Beth RN  Outcome: Progressing     Problem: ABCDS Injury Assessment  Goal: Absence of physical injury  11/25/2022 0129 by Perri Beth RN  Outcome: Progressing     Problem: Metabolic/Fluid and Electrolytes - Adult  Goal: Electrolytes maintained within normal limits  11/25/2022 0129 by ePrri Beth RN  Outcome: Progressing     Problem: Metabolic/Fluid and Electrolytes - Adult  Goal: Hemodynamic stability and optimal renal function maintained  11/25/2022 0129 by Perri Beth RN  Outcome: Progressing     Problem: Metabolic/Fluid and Electrolytes - Adult  Goal: Glucose maintained within prescribed range  11/25/2022 0129 by Perri Beth RN  Outcome: Progressing     Problem: Chronic Conditions and Co-morbidities  Goal: Patient's chronic conditions and co-morbidity symptoms are monitored and maintained or improved  11/25/2022 0129 by Perri Beth RN  Outcome: Progressing

## 2022-11-25 NOTE — DISCHARGE INSTRUCTIONS
F/up dr Chapis Winter 2 weeks    Discharge Instructions for Radial Heart Catherization    1. Take it easy for 3-4 days. 2.  No driving for 2 days. 3.  No lifting of 5 lbs or more for 5 days with the affected arm. 4.  Can shower after 24 hours. 5.  Remove arm board after 24 hours. 6.  Apply a band aid to the insertion site daily for 5 days. May apply antibiotic ointment if desired, but not necessary. Wash site daily with soap and water. 7.  No creams, ointments, or powders near the insertion site. 8.  No tub baths, swimming, hot tubs, or hand washing dishes for 1 week. 9.  Watch for signs of infection (redness, warmth, swelling, or pus drainage) or coolness of extremity and call physician if this occurs  10. If bleeding occurs from insertion site, apply pressure and call 911.

## 2022-11-25 NOTE — CARE COORDINATION
Case Management Assessment  Initial Evaluation    Date/Time of Evaluation: 11/25/2022 11:44 AM  Assessment Completed by: Sarai Trinh RN    If patient is discharged prior to next notation, then this note serves as note for discharge by case management. Patient Name: Alejandro Mullins                   YOB: 1970  Diagnosis: Chest pain on exertion [R07.9]                   Date / Time: 11/23/2022  8:43 AM    Patient Admission Status: Inpatient     Current PCP: Carlton Haji MD  PCP verified by CM? Yes    Chart Reviewed: Yes      Patient Orientation: Alert and Oriented    Patient Cognition: Alert  History Provided by:      Hospitalization in the last 30 days (Readmission):  No    If yes, Readmission Assessment in  Navigator will be completed. Advance Directives:     Code Status: Full Code       Discharge Planning  Patient lives with: Friends Type of Home: House   Primary Caregiver: Self  Patient Support Systems include: Family Members   Current Financial resources: Medicare, Medicaid  Current community resources:    Current services prior to admission: Durable Medical Equipment   Type of Home Care services:  None    ADLS  Prior functional level: Independent in ADLs/IADLs  Current functional level: Independent in ADLs/IADLs      Family can provide assistance at DC: Yes  Would you like Case Management to discuss the discharge plan with any other family members/significant others, and if so, who?     Plans to Return to Present Housing: Yes  Other Identified Issues/Barriers to RETURNING to current housing: medical complications  Potential Assistance needed at discharge: N/A  Patient expects to discharge to: [address] [place] [address] for transportation at discharge: Self    Financial  Payor: Ozmott Carlos Jenkins / Plan: Ashlyn Pendleton / Product Type: *No Product type* /     Does insurance require precert for SNF: Yes    Potential assistance Purchasing Medications:    Meds-to-Beds request: Yes      [address], [address] [facility] [address] [facility]  [address]  [address] [facility] [address]  Phone: [phone] Fax: [phone]    SUSANNAELIZABETHADRIELNATHALIEDORIS  [address] [phone] [place] [phone] [address] [address]  Phone: [phone] Fax: [phone]      Factors facilitating achievement of predicted outcomes: Family support    Barriers to discharge: Medical complications    Additional Case Management Notes: From ED, diabetes management, Covid (+), room air with saturations at 93%, afebrile, telemetry, Cardiology consult, Asa, Lipitor, Heparin drip, IV fluids, electrolyte replacement protocols. Procedure:   11/23 CXR Stable radiographic appearance of the chest. No evidence of an acute process. 11/23 CTA Chest W WO Contrast  No pulmonary emboli or pulmonary infiltrates. 11/25 ECHO    11/25 Cardiac Cath    The Plan for Transition of Care is related to the following treatment goals of Chest pain on exertion [R07.9]    Patient Goals/Plan/Treatment Preferences: Met with Ervin Sheehan. He currently lives at home with family. He uses a wheelchair and is independent, he drives. Plan is to return home at discharge. He denies need for DME and declines HH. Will follow. Transportation/Food Security/Housekeeping Addressed: No issues identified.      Niki Moses RN  Case Management Department

## 2022-11-25 NOTE — BRIEF OP NOTE
[address] [address]  Sedation/Analgesia Post Sedation Record    Pt Name: Eldon Severance  Account number: [de-identified]  MRN: [id number]  YOB: 1970  Procedure Performed By: MD MD Pilo Stephens, [address]  Primary Care Physician: Najma Gay MD  Date: 11/25/2022    POST-PROCEDURE    Physicians/Assistants: MD MD Pilo Stephnes, RPVI    Procedure Performed:Cath/ PCI      Sedation/Anesthesia: Versed/ Fentanyl and 2% xylocaine local anesthesia. Estimated Blood Loss: < 50 ml. Specimens Removed: None         Disposition of Specimen: N/A        Complications: No Immediate Complications.        Post-procedure Diagnosis/Findings:    RCA 90% x 1 OLEG                MD MD Pilo Stephens, ERIKVI  Electronically signed 11/25/2022 at 6:51 PM  Interventional Cardiology

## 2022-11-25 NOTE — PLAN OF CARE
Problem: Discharge Planning  Goal: Discharge to home or other facility with appropriate resources  11/25/2022 1502 by Amanda Alvarez RN  Outcome: Progressing  Flowsheets (Taken 11/25/2022 1502)  Discharge to home or other facility with appropriate resources: Identify barriers to discharge with patient and caregiver  Note: Patient preference to discharge home alone      Problem: Pain  Goal: Verbalizes/displays adequate comfort level or baseline comfort level  11/25/2022 1502 by Amanda Alvarez RN  Outcome: Progressing  Flowsheets (Taken 11/25/2022 1502)  Verbalizes/displays adequate comfort level or baseline comfort level: Encourage patient to monitor pain and request assistance  Note: Patient continues to complaint of sharp pain mid sternal. Pain does not affect activities. VS stable. Patient receiving Heart cath this afternoon. The patient pain goal is 0 out of 10. Distraction, rest  and reposition are non med measures. 11/25/2022 0129 by Berto Moreira RN  Outcome: Progressing  11/25/2022 0128 by Berto Moreira RN  Outcome: Progressing     Problem: Skin/Tissue Integrity  Goal: Absence of new skin breakdown  Description: 1. Monitor for areas of redness and/or skin breakdown  2. Assess vascular access sites hourly  3. Every 4-6 hours minimum:  Change oxygen saturation probe site  4. Every 4-6 hours:  If on nasal continuous positive airway pressure, respiratory therapy assess nares and determine need for appliance change or resting period. 11/25/2022 1502 by Amanda Alvarez RN  Outcome: Progressing  Note: No new signs or symptoms of skin breakdown noted this shift, encouraging patient to turn and reposition self in bed q2h       Problem: Safety - Adult  Goal: Free from fall injury  11/25/2022 1502 by Amanda Alvarez RN  Outcome: Progressing  Note: No falls noted this shift. Continue falling star program. Bed alarm on, bed in low position. Call light and personal belongings in reach. Patient uses call light appropriately. Problem: Metabolic/Fluid and Electrolytes - Adult  Goal: Electrolytes maintained within normal limits  11/25/2022 1502 by Lisa Conner RN  Outcome: Progressing  Flowsheets (Taken 11/25/2022 1502)  Electrolytes maintained within normal limits:   Monitor labs and assess patient for signs and symptoms of electrolyte imbalances   Administer electrolyte replacement as ordered     Problem: Respiratory - Adult  Goal: Achieves optimal ventilation and oxygenation  Outcome: Progressing  Flowsheets (Taken 11/25/2022 1502)  Achieves optimal ventilation and oxygenation: Assess for changes in respiratory status     Problem: Chronic Conditions and Co-morbidities  Goal: Patient's chronic conditions and co-morbidity symptoms are monitored and maintained or improved  11/25/2022 1502 by Lisa Conner RN  Outcome: Progressing  Flowsheets (Taken 11/25/2022 1502)  Care Plan - Patient's Chronic Conditions and Co-Morbidity Symptoms are Monitored and Maintained or Improved: Monitor and assess patient's chronic conditions and comorbid symptoms for stability, deterioration, or improvement       Care plan reviewed with patient. Patient verbalize understanding of the plan of care and contribute to goal setting.

## 2022-11-26 VITALS
RESPIRATION RATE: 16 BRPM | TEMPERATURE: 97.9 F | DIASTOLIC BLOOD PRESSURE: 63 MMHG | OXYGEN SATURATION: 100 % | HEART RATE: 81 BPM | HEIGHT: 66 IN | SYSTOLIC BLOOD PRESSURE: 154 MMHG | BODY MASS INDEX: 37.12 KG/M2 | WEIGHT: 231 LBS

## 2022-11-26 LAB
ANION GAP SERPL CALCULATED.3IONS-SCNC: 11 MEQ/L (ref 8–16)
BUN BLDV-MCNC: 9 MG/DL (ref 7–22)
CALCIUM SERPL-MCNC: 9.4 MG/DL (ref 8.5–10.5)
CHLORIDE BLD-SCNC: 101 MEQ/L (ref 98–111)
CO2: 26 MEQ/L (ref 23–33)
CREAT SERPL-MCNC: 0.7 MG/DL (ref 0.4–1.2)
EKG ATRIAL RATE: 79 BPM
EKG P AXIS: 9 DEGREES
EKG P-R INTERVAL: 232 MS
EKG Q-T INTERVAL: 378 MS
EKG QRS DURATION: 76 MS
EKG QTC CALCULATION (BAZETT): 433 MS
EKG R AXIS: -5 DEGREES
EKG T AXIS: 34 DEGREES
EKG VENTRICULAR RATE: 79 BPM
ERYTHROCYTE [DISTWIDTH] IN BLOOD BY AUTOMATED COUNT: 13.9 % (ref 11.5–14.5)
ERYTHROCYTE [DISTWIDTH] IN BLOOD BY AUTOMATED COUNT: 45.8 FL (ref 35–45)
GFR SERPL CREATININE-BSD FRML MDRD: > 60 ML/MIN/1.73M2
GLUCOSE BLD-MCNC: 111 MG/DL (ref 70–108)
GLUCOSE BLD-MCNC: 200 MG/DL (ref 70–108)
GLUCOSE BLD-MCNC: 99 MG/DL (ref 70–108)
HCT VFR BLD CALC: 42.3 % (ref 42–52)
HEMOGLOBIN: 14.6 GM/DL (ref 14–18)
HEPARIN UNFRACTIONATED: < 0.04 U/ML (ref 0.3–0.7)
MCH RBC QN AUTO: 31.1 PG (ref 26–33)
MCHC RBC AUTO-ENTMCNC: 34.5 GM/DL (ref 32.2–35.5)
MCV RBC AUTO: 90.2 FL (ref 80–94)
PLATELET # BLD: 185 THOU/MM3 (ref 130–400)
PMV BLD AUTO: 9.3 FL (ref 9.4–12.4)
POTASSIUM SERPL-SCNC: 4.2 MEQ/L (ref 3.5–5.2)
RBC # BLD: 4.69 MILL/MM3 (ref 4.7–6.1)
SODIUM BLD-SCNC: 138 MEQ/L (ref 135–145)
WBC # BLD: 9.5 THOU/MM3 (ref 4.8–10.8)

## 2022-11-26 PROCEDURE — 36415 COLL VENOUS BLD VENIPUNCTURE: CPT

## 2022-11-26 PROCEDURE — 85027 COMPLETE CBC AUTOMATED: CPT

## 2022-11-26 PROCEDURE — 82948 REAGENT STRIP/BLOOD GLUCOSE: CPT

## 2022-11-26 PROCEDURE — 93010 ELECTROCARDIOGRAM REPORT: CPT | Performed by: INTERNAL MEDICINE

## 2022-11-26 PROCEDURE — 99232 SBSQ HOSP IP/OBS MODERATE 35: CPT | Performed by: PHYSICIAN ASSISTANT

## 2022-11-26 PROCEDURE — 6370000000 HC RX 637 (ALT 250 FOR IP): Performed by: PHYSICIAN ASSISTANT

## 2022-11-26 PROCEDURE — 99239 HOSP IP/OBS DSCHRG MGMT >30: CPT | Performed by: INTERNAL MEDICINE

## 2022-11-26 PROCEDURE — 85520 HEPARIN ASSAY: CPT

## 2022-11-26 PROCEDURE — 80048 BASIC METABOLIC PNL TOTAL CA: CPT

## 2022-11-26 RX ORDER — ZINC SULFATE 50(220)MG
50 CAPSULE ORAL DAILY
Qty: 30 CAPSULE | Refills: 3 | COMMUNITY
Start: 2022-11-26 | End: 2022-11-30 | Stop reason: SDUPTHER

## 2022-11-26 RX ORDER — ASCORBIC ACID 500 MG
500 TABLET ORAL DAILY
Qty: 30 TABLET | Refills: 3 | Status: SHIPPED | OUTPATIENT
Start: 2022-11-26 | End: 2022-11-30 | Stop reason: SDUPTHER

## 2022-11-26 RX ORDER — ASPIRIN 81 MG/1
81 TABLET, CHEWABLE ORAL DAILY
Qty: 30 TABLET | Refills: 3 | Status: SHIPPED | OUTPATIENT
Start: 2022-11-26 | End: 2022-11-30 | Stop reason: SDUPTHER

## 2022-11-26 RX ORDER — ATORVASTATIN CALCIUM 80 MG/1
80 TABLET, FILM COATED ORAL NIGHTLY
Qty: 30 TABLET | Refills: 3 | Status: SHIPPED | OUTPATIENT
Start: 2022-11-26 | End: 2022-11-30 | Stop reason: SDUPTHER

## 2022-11-26 RX ORDER — CHOLECALCIFEROL (VITAMIN D3) 25 MCG
1000 TABLET ORAL DAILY
Qty: 60 TABLET | Refills: 0 | Status: SHIPPED | OUTPATIENT
Start: 2022-11-26

## 2022-11-26 RX ADMIN — LEVOTHYROXINE SODIUM 50 MCG: 0.05 TABLET ORAL at 06:50

## 2022-11-26 NOTE — PROGRESS NOTES
Hospitalist Progress Note      Patient:  London Sinha    Unit/Bed:8B-38/038-A  YOB: 1970  MRN: [id number]   Acct: [de-identified]     PCP: Driss Leblanc MD  Date of Admission: 11/23/2022     NSTEMI-consult cardiology 11/24, aspirin, heparin drip started last night, nitro, statin, metoprolol, lisinopril-likely will need cardiac cath  Continue to trend troponin follow-up 2D echo      11/25-planning for cardiac cath with cardiology today    COVID19 infection no hypoxic respiratory failure  CTA chest no infiltrates noted, negative for PE  Supportive care- vitamins, cough suppressants, incentive spirometry     Lactic acidosis-resolved with IV fluids      Type II diabetes with diabetic neuropathy   Held home medications- jardiance and trulicity   40 units humalog before meals- start 20 units titrate up   Lantus 85 units daily- start with 50 units   Low correction scale, hypoglycemia protocol  Inpatient BS goal 140 -200     Chronic CHFpEF-compensated  Echo 60% Ef  on 9/15/20 - grade 1 diastolic dysfunction  Repeat echo pending  Daily weights, I and O's   Continue metoprolol and lisinopril     History of AKA   Right leg above knee amputation noted     Hypothyroidism   Synthroid 50 mcg     Hyperlipidemia   Statin increased to 80 mg      =======================================================================      Chief Complaint:  Chest pain     History Of Present Illness:  London Sinha is a 45 y.o. male with PMHx of IIDM2 with neuropathy, primary hypertension, hypothyroidism who presents to [address] [place] [address] with chest pain. Patient states his chest pain started Saturday night 11/19 and has been constant since. He states the chest pain is around his sternum and sharp, reproducible with palpation, coughing and deep breathing. He also endorses dull aching into his left shoulder with numbness going down his left arm. He is unable to identifying any alleviating factors.  He did not try any nitro and does not typically use it. He had trouble discerning if his chest pain worsened by physical exertion or by moving his upper body/chest area. It appears to be the latter. He states he has had nausea and vomiting since the onset of his pain. He vomited twice on Saturday once each day since then as well as loose stools. diaarhea. He denies hematemesis, melena or sara bloody stools. He did not endorse any hemoptysis during my history. ED course:   CTA chest, EKG   Subjective:- (Last 24 hours)    No chest pain no shortness of breath no dizziness overnight  Awaiting for cardiac catheter today      Past medical history, family history, social history and allergies reviewed again and is unchanged since admission. ROS (All review of systems completed. Pertinent positives noted.  Otherwise All other systems reviewed and negative.)     Scheduled Meds:   sodium chloride flush  5-40 mL IntraVENous 2 times per day    aspirin  81 mg Oral Daily    ticagrelor  90 mg Oral BID    sodium chloride flush  10 mL IntraVENous 2 times per day    ARIPiprazole  5 mg Oral Daily    atorvastatin  80 mg Oral Nightly    levothyroxine  50 mcg Oral Daily    sertraline  100 mg Oral Daily    metoprolol tartrate  50 mg Oral BID    lisinopril  10 mg Oral Daily    gabapentin  300 mg Oral TID    insulin lispro  20 Units SubCUTAneous TID WC    ascorbic acid  500 mg Oral Daily    zinc sulfate  50 mg Oral Daily    Vitamin D  1,000 Units Oral Daily    insulin glargine  50 Units SubCUTAneous Nightly    insulin lispro  0-8 Units SubCUTAneous TID WC    insulin lispro  0-4 Units SubCUTAneous Nightly     Continuous Infusions:   sodium chloride      nitroGLYCERIN Stopped (11/24/22 1252)    heparin (PORCINE) Infusion 17 Units/kg/hr (11/25/22 1729)    sodium chloride      dextrose      lactated ringers 75 mL/hr at 11/25/22 0942     PRN Meds:.sodium chloride flush, sodium chloride, acetaminophen, heparin (porcine), heparin (porcine), sodium chloride flush, sodium chloride, ondansetron **OR** ondansetron, polyethylene glycol, [DISCONTINUED] acetaminophen **OR** acetaminophen, potassium chloride **OR** potassium alternative oral replacement **OR** potassium chloride, magnesium sulfate, nitroGLYCERIN, glucose, dextrose bolus **OR** dextrose bolus, glucagon (rDNA), dextrose, benzonatate     Physical Exam:    BP (!) 154/63   Pulse 81   Temp 97.9 °F (36.6 °C) (Oral)   Resp 16   Ht 5' 6\" (1.676 m)   Wt 231 lb (104.8 kg)   SpO2 100%   BMI 37.28 kg/m²       General appearance: No apparent distress, appears stated age. Eyes:  Pupils equal, round, and reactive to light. Conjunctivae/corneas clear. HENT: Head normal in appearance. External nares normal.  Oral mucosa moist without lesions. Hearing grossly intact. Neck: Supple, with full range of motion. Trachea midline. No gross JVD appreciated. Respiratory:  Normal respiratory effort. Clear to auscultation, bilaterally without rales or wheezes or rhonchi. Cardiovascular: tachycardic rate, regular rhythm with normal S1/S2 without murmurs. No lower extremity edema. Abdomen: Soft, non-tender, non-distended with normal bowel sounds. Musculoskeletal: aka amputation of right leg, tenderness to palpation on chest  Skin: Warm and dry. No rashes or lesions. Neurologic:  No focal sensory/motor deficits in the upper and lower extremities. Cranial nerves:  grossly non-focal 2-12. Psychiatric: Alert and oriented, normal insight and thought content. Capillary Refill: Brisk,< 3 seconds. Peripheral Pulses: +2 palpable, equal bilaterally.          Labs:     Recent Labs     11/24/22 0451 11/26/22  0541   WBC 7.5 9.5   HGB 14.1 14.6   HCT 40.6* 42.3    185       Recent Labs     11/23/22  1553 11/24/22 0451 11/26/22  0541    133* 138   K 4.0 4.2 4.2   CL 97* 98 101   CO2 26 26 26   BUN 12 17 9   CREATININE 1.0 0.8 0.7   CALCIUM 8.9 8.4* 9.4       Recent Labs     11/24/22  0451   AST 27   ALT 20   BILITOT 0.4 ALKPHOS 68       No results for input(s): INR in the last 72 hours. No results for input(s): Marylin Bigness in the last 72 hours. Lab Results   Component Value Date/Time    NITRU NEGATIVE 07/14/2022 10:30 PM    WBCUA 0-2 07/14/2022 10:30 PM    BACTERIA NONE SEEN 07/14/2022 10:30 PM    RBCUA 0-2 07/14/2022 10:30 PM    BLOODU NEGATIVE 07/14/2022 10:30 PM    SPECGRAV 1.025 07/27/2021 10:00 PM    GLUCOSEU 250 07/14/2022 10:30 PM         Radiology:     CTA CHEST W WO CONTRAST   Final Result    No pulmonary emboli or pulmonary infiltrates. **This report has been created using voice recognition software. It may contain minor errors which are inherent in voice recognition technology. **      Final report electronically signed by Dr. Tino Amezquita on 11/23/2022 11:22 AM      XR CHEST (2 VW)   Final Result   Stable radiographic appearance of the chest. No evidence of an acute process. **This report has been created using voice recognition software. It may contain minor errors which are inherent in voice recognition technology. **      Final report electronically signed by Dr. Tino Amezquita on 11/23/2022 9:31 AM             EKG:  I have reviewed the EKG with the following interpretation:   Sinus tachycardia             Electronically signed by Joselin Bell MD on 11/26/2022 at 11:19 AM.

## 2022-11-26 NOTE — PROCEDURES
[address] [place] [address]                            CARDIAC CATHETERIZATION    PATIENT NAME: Barbara Escalona                     :        1970  MED REC NO:   [id number]                           ROOM:       0038  ACCOUNT NO:   [de-identified]                           ADMIT DATE: 2022  PROVIDER:     Felicita Tony MD    DATE OF PROCEDURE:  2022    INDICATION FOR PROCEDURE:  NSTEMI. DESCRIPTION OF PROCEDURE:  After informed consent was obtained from the  patient, he was brought to cardiac catheterization laboratory and  prepped in sterile fashion. Right radial artery was chosen as the  primary point of access. Preprocedure timeout was completed. After  infiltration of right wrist with 2% lidocaine using micropuncture,  modified Seldinger technique under fluoroscopic guidance and ultrasound  guidance, I was able to insert 6-[place] Slender sheath in right radial  artery. Standard antispasmodic/antithrombotic cocktail was given  intraarterially. We then performed diagnostic coronary angiography  using 5-[place] JR4 and 5-[place] JL4 catheters for closure. CORONARY ANGIOGRAM:  LEFT MAIN:  Patent without any significant obstruction. LAD:  Mild luminal irregularities but patent without any significant  obstruction. Diagonal 1 and diagonal 2 branches without any  obstruction. LCX:  Gives rise to large OM1, OM2 system without any obstruction. At  the takeoff of OM1, there is about 30 to 40% stenosis in AV groove of  circ. RCA:  Mid 80 to 90% stenosis. Gives rise to PDA and PLV both without  any obstruction. RCA is dominant. INTERVENTION:  Given the findings on presentation, I proceeded with  intervention of the RCA. Exchanged out for 6-[place] JR4 guiding  catheter. Heparin IV was given. ACT was confirmed to be above 250  seconds. I wired the lesion using Runthrough wire.   I then direct  stented the lesion using 2.5 x 18 Resolute Petey OLEG at nominal pressure. I postdilated the stent with 4.0 NC balloon up to 14 atmospheres. Post  PCI angiography demonstrated NANCI-3 flow without any perforation,  dissection, distal embolization, side branch loss, or guide or  guidewire-induced trauma. All equipment was removed from the patient. The patient tolerated the procedure well. IMMEDIATE COMPLICATIONS:  None. MEDICATIONS:  See MAR. ACCESS:  Vasc band used for hemostasis. ESTIMATED BLOOD LOSS:  Less than 50 mL. SUMMARY:  Successful PCI of the RCA with one drug-eluting stent. PLAN:  1. Bedrest.  2.  Optimal medical therapy. 3.  Risk factor management. 4.  Routine access site care. 5.  DAPT. 6.  IV fluids. 7.  Continue management of the patient's chronic medical issue. 8.  Uptitrate antianginals. 9.  Guideline directed therapy for CAD/ACS. 10.  Follow up with myself or primary cardiologist in three to four  weeks postprocedure. All the above was explained to the patient and the patient's family. They are agreeable and amenable to plan.         Felicity Valle MD    D: 11/25/2022 19:10:45       T: 11/25/2022 20:38:07     [id number]  Job#: [id number]     Doc#: [id number]    CC:

## 2022-11-26 NOTE — PROGRESS NOTES
Discharge teaching and instructions for diagnosis/procedure of chest pain completed with patient using teachback method. AVS reviewed. Prescriptions sent to patient's pharmacy. Patient voiced understanding regarding prescriptions, follow up appointments, and care of self at home.  Discharged in a wheelchair to  home with support per family

## 2022-11-26 NOTE — PROGRESS NOTES
Cardiology Progress Note      Patient:  Jenny Robertson  YOB: 1970  MRN: [id number]   Acct: [de-identified]  Admit Date:  11/23/2022  Primary Cardiologist:  none    Note per dr Trey Harrell FOR CONSULTATION:  Chest pain, elevated troponin in a 45year-old  gentleman, presented with recurrent chest pain over the last four to  five days. HISTORY OF PRESENT ILLNESS:  This is a 33-year-old  gentleman  with past medical history of diabetes, above-knee amputation on the  right, was in usual state of health until last Sunday, which is five  days ago, and started to have chest pain, retrosternal, pressure,  heaviness, like 7 to 8/10, radiating to the left arm associated with  some shortness of breath, diaphoresis, and coming on and going  frequently like lasts 20 to 30 minutes and resolves on its own, and has  been having it almost every day or several times a day, and associated  with nausea, has been having a cough productive of yellowish sputum,  with fever and chills. He has been having intermittent nausea and  vomiting. So, in view of that, he came to the emergency room and has  been evaluated and found to have COVID-19 related upper respiratory  infection and elevated troponin, so the patient was admitted. Now, he  is under isolation. Cardiology evaluation was sought in view of this  chest pain and troponin elevation. The patient has been having two  episodes of chest pain this morning, lasted 20 to 30 minutes and so  currently chest pain free. No previous history of chest pain. Per  review of the record, he had cardiac catheterization in 2018 at [facility], mid LAD 50%, RCA 30% lesion. Medical therapy was recommended  at that time\"    Subjective (Events in last 24 hours): pt awake and alert. NAD> no cp or sob.  No complaints  On RA      Objective:   BP (!) 140/20   Pulse 76   Temp 97.6 °F (36.4 °C) (Oral)   Resp 16   Ht 5' 6\" (1.676 m)   Wt 231 lb (104.8 kg)   SpO2 98%   BMI 37.28 kg/m²        TELEMETRY: nsr    Physical Exam:  General Appearance: alert and oriented to person, place and time, in no acute distress  Cardiovascular: normal rate, regular rhythm, normal S1 and S2, no murmurs, rubs, clicks, or gallops, distal pulses intact, no carotid bruits, no JVD  Pulmonary/Chest: clear to auscultation bilaterally- no wheezes, rales or rhonchi, normal air movement, no respiratory distress  Abdomen: soft, non-tender, non-distended, normal bowel sounds, no masses Extremities: no cyanosis, clubbing or edema, pulse   Skin: warm and dry  Head: normocephalic and atraumatic  Eyes: pupils equal, round, and reactive to light  Neck: supple and non-tender without mass, no thyromegaly   Neurological: alert, oriented, normal speech, no focal findings or movement disorder noted  Right wrist - no hematoma, +2 radial pulse    Medications:    sodium chloride flush  5-40 mL IntraVENous 2 times per day    aspirin  81 mg Oral Daily    ticagrelor  90 mg Oral BID    sodium chloride flush  10 mL IntraVENous 2 times per day    ARIPiprazole  5 mg Oral Daily    atorvastatin  80 mg Oral Nightly    levothyroxine  50 mcg Oral Daily    sertraline  100 mg Oral Daily    metoprolol tartrate  50 mg Oral BID    lisinopril  10 mg Oral Daily    gabapentin  300 mg Oral TID    insulin lispro  20 Units SubCUTAneous TID WC    ascorbic acid  500 mg Oral Daily    zinc sulfate  50 mg Oral Daily    Vitamin D  1,000 Units Oral Daily    insulin glargine  50 Units SubCUTAneous Nightly    insulin lispro  0-8 Units SubCUTAneous TID WC    insulin lispro  0-4 Units SubCUTAneous Nightly      sodium chloride      nitroGLYCERIN Stopped (11/24/22 1252)    heparin (PORCINE) Infusion 17 Units/kg/hr (11/25/22 1729)    sodium chloride      dextrose      lactated ringers 75 mL/hr at 11/25/22 0942     sodium chloride flush, 5-40 mL, PRN  sodium chloride, , PRN  acetaminophen, 650 mg, Q4H PRN  heparin (porcine), 4,000 Units, PRN  heparin (porcine), 2,000 Units, PRN  sodium chloride flush, 10 mL, PRN  sodium chloride, , PRN  ondansetron, 4 mg, Q8H PRN   Or  ondansetron, 4 mg, Q6H PRN  polyethylene glycol, 17 g, Daily PRN  acetaminophen, 650 mg, Q6H PRN  potassium chloride, 40 mEq, PRN   Or  potassium alternative oral replacement, 40 mEq, PRN   Or  potassium chloride, 10 mEq, PRN  magnesium sulfate, 2,000 mg, PRN  nitroGLYCERIN, 0.4 mg, Q5 Min PRN  glucose, 4 tablet, PRN  dextrose bolus, 125 mL, PRN   Or  dextrose bolus, 250 mL, PRN  glucagon (rDNA), 1 mg, PRN  dextrose, , Continuous PRN  benzonatate, 200 mg, TID PRN        Diagnostics:  Cath 11/25/22  CORONARY ANGIOGRAM:  LEFT MAIN:  Patent without any significant obstruction. LAD:  Mild luminal irregularities but patent without any significant  obstruction. Diagonal 1 and diagonal 2 branches without any  obstruction. LCX:  Gives rise to large OM1, OM2 system without any obstruction. At  the takeoff of OM1, there is about 30 to 40% stenosis in AV groove of  circ. RCA:  Mid 80 to 90% stenosis. Gives rise to PDA and PLV both without  any obstruction. RCA is dominant. INTERVENTION:  Given the findings on presentation, I proceeded with  intervention of the RCA. Exchanged out for 6-Guyanese JR4 guiding  catheter. Heparin IV was given. ACT was confirmed to be above 250  seconds. I wired the lesion using Runthrough wire. I then direct  stented the lesion using 2.5 x 18 Resolute Petey OLEG at nominal pressure. I postdilated the stent with 4.0 NC balloon up to 14 atmospheres. Post  PCI angiography demonstrated NANCI-3 flow without any perforation,  dissection, distal embolization, side branch loss, or guide or  guidewire-induced trauma. All equipment was removed from the patient. The patient tolerated the procedure well. IMMEDIATE COMPLICATIONS:  None. MEDICATIONS:  See MAR. ACCESS:  Vasc band used for hemostasis. ESTIMATED BLOOD LOSS:  Less than 50 mL.      SUMMARY:  Successful PCI of the RCA with one drug-eluting stent. PLAN:  1. Bedrest.  2.  Optimal medical therapy. 3.  Risk factor management. 4.  Routine access site care. 5.  DAPT. 6.  IV fluids. 7.  Continue management of the patient's chronic medical issue. 8.  Uptitrate antianginals. 9.  Guideline directed therapy for CAD/ACS. 10.  Follow up with myself or primary cardiologist in three to four  weeks postprocedure. All the above was explained to the patient and the patient's family. They are agreeable and amenable to plan. Sixto Morales MD    TTE 11/25/22  Summary   Normal left ventricle size and systolic function. Ejection fraction was   estimated at 55 %. There were no regional left ventricular wall motion   abnormalities and wall thickness was within normal limits. Signature      ----------------------------------------------------------------   Electronically signed by Shon Wilson MD (Interpreting   physician) on 11/25/2022 at 08:24 PM   ----------------------------------------------------------------      Lab Data:    Cardiac Enzymes:  No results for input(s): CKTOTAL, CKMB, CKMBINDEX, TROPONINI in the last 72 hours.     CBC:   Lab Results   Component Value Date/Time    WBC 9.5 11/26/2022 05:41 AM    RBC 4.69 11/26/2022 05:41 AM    HGB 14.6 11/26/2022 05:41 AM    HCT 42.3 11/26/2022 05:41 AM     11/26/2022 05:41 AM       CMP:    Lab Results   Component Value Date/Time     11/26/2022 05:41 AM    K 4.2 11/26/2022 05:41 AM    K 4.0 11/23/2022 03:53 PM     11/26/2022 05:41 AM    CO2 26 11/26/2022 05:41 AM    BUN 9 11/26/2022 05:41 AM    CREATININE 0.7 11/26/2022 05:41 AM    GFRAA >60 08/24/2016 05:54 PM    LABGLOM >60 11/25/2022 07:17 PM    GLUCOSE 99 11/26/2022 05:41 AM    GLUCOSE 114 12/26/2020 09:39 AM    CALCIUM 9.4 11/26/2022 05:41 AM       Hepatic Function Panel:    Lab Results   Component Value Date/Time    ALKPHOS 68 11/24/2022 04:51 AM    ALT 20 11/24/2022 04:51 AM AST 27 11/24/2022 04:51 AM    PROT 6.1 11/24/2022 04:51 AM    BILITOT 0.4 11/24/2022 04:51 AM    BILIDIR <0.2 07/14/2022 02:30 PM    IBILI 0.1 03/02/2015 11:30 AM    LABALBU 3.6 11/24/2022 04:51 AM       Magnesium:    Lab Results   Component Value Date/Time    MG 2.2 11/23/2022 09:00 AM       PT/INR:    Lab Results   Component Value Date/Time    PROTIME 12.0 12/26/2020 09:39 AM    PROTIME 12.2 01/03/2019 12:00 AM    INR 1.04 11/23/2022 10:41 AM       HgBA1c:    Lab Results   Component Value Date/Time    LABA1C 7.8 09/15/2022 09:06 AM       FLP:    Lab Results   Component Value Date/Time    TRIG 192 10/29/2021 04:30 PM    HDL 37 10/29/2021 04:30 PM    LDLCALC 92 10/29/2021 04:30 PM    LDLDIRECT 102.12 02/26/2021 10:53 AM       TSH:    Lab Results   Component Value Date/Time    TSH 2.480 11/23/2022 09:00 AM         Assessment:    NSTEMI  S/p cath 11/25/22 - s/p OLEG RCA  Ef 55 per TTE 11/25/22  HTN  HLD  DM   Hx right AKA  +COVID      Plan:    Cont asa/brilinta/statin/BB/ace  Cardiac rehab  Sl ntg upon dc  F/up dr Hawa Land 2 weeks  Will follow prn    Patient and Practitioner mutually agreed upon goal:   Patient and provider goals: Feel better and have more energy and Begin Cardiac Rehab     Electronically signed by Viri Kim PA-C on 11/26/2022 at 7:53 AM

## 2022-11-26 NOTE — DISCHARGE SUMMARY
Hospitalist discharge note      Patient:  Henrietta Looney    Unit/Bed:8B-38/038-A  YOB: 1970  MRN: [id number]   Acct: [de-identified]     PCP: Vitaly Paez MD  Date of Admission: 11/23/2022     NSTEMI-consult cardiology 11/24, aspirin, heparin drip started last night, nitro, statin, metoprolol, lisinopril-likely will need cardiac cath  Continue to trend troponin follow-up 2D echo      11/25-planning for cardiac cath with cardiology today    11/26-had drug-eluting stent to RCA on 11/25, continue aspirin, Lipitor, Brilinta, lisinopril, Toprol, nitro as needed, cardiac rehab added as per cardiologist    COVID19 infection no hypoxic respiratory failure  CTA chest no infiltrates noted, negative for PE  Supportive care- vitamins, cough suppressants, incentive spirometry     Lactic acidosis-resolved with IV fluids      Type II diabetes with diabetic neuropathy   Held home medications- jardiance and trulicity   40 units humalog before meals- start 20 units titrate up   Lantus 85 units daily- start with 50 units   Low correction scale, hypoglycemia protocol  Inpatient BS goal 140 -200     Chronic CHFpEF-compensated  Echo 60% Ef  on 9/15/20 - grade 1 diastolic dysfunction  Repeat echo pending  Daily weights, I and O's   Continue metoprolol and lisinopril     History of AKA   Right leg above knee amputation noted     Hypothyroidism   Synthroid 50 mcg     Hyperlipidemia   Statin increased to 80 mg    Stable for home today  PCP in 1 week  Cardiology in 1 to 2 weeks time as outpatient  Cardiac rehab added at discharge as per cardiologist    Discharge time 35 minutes      =======================================================================      Chief Complaint:  Chest pain     History Of Present Illness:  Henrietta Looney is a 45 y.o. male with PMHx of IIDM2 with neuropathy, primary hypertension, hypothyroidism who presents to [address] [place] [address] with chest pain.  Patient states his chest pain started Saturday night 11/19 and has been constant since. He states the chest pain is around his sternum and sharp, reproducible with palpation, coughing and deep breathing. He also endorses dull aching into his left shoulder with numbness going down his left arm. He is unable to identifying any alleviating factors. He did not try any nitro and does not typically use it. He had trouble discerning if his chest pain worsened by physical exertion or by moving his upper body/chest area. It appears to be the latter. He states he has had nausea and vomiting since the onset of his pain. He vomited twice on Saturday once each day since then as well as loose stools. diaarhea. He denies hematemesis, melena or sara bloody stools. He did not endorse any hemoptysis during my history. ED course:   CTA chest, EKG      Medication List        START taking these medications      ascorbic acid 500 MG tablet  Commonly known as: VITAMIN C  Take 1 tablet by mouth daily     aspirin 81 MG chewable tablet  Take 1 tablet by mouth daily     ticagrelor 90 MG Tabs tablet  Commonly known as: BRILINTA  Take 1 tablet by mouth 2 times daily     Vitamin D3 25 MCG Tabs  Take 1 tablet by mouth daily     zinc sulfate 220 (50 Zn) MG capsule  Commonly known as: ZINCATE  Take 1 capsule by mouth daily            CHANGE how you take these medications      atorvastatin 80 MG tablet  Commonly known as: LIPITOR  Take 1 tablet by mouth at bedtime  What changed:   medication strength  See the new instructions.      gabapentin 400 MG capsule  Commonly known as: NEURONTIN  Change to 400 mg TID  What changed:   how much to take  additional instructions     insulin lispro 200 UNIT/ML Sopn pen  Commonly known as: HUMALOG KWIKPEN U-200  40 units Plus scale before meals; only takes if BG >200 - Max dose 160 units per day  What changed: additional instructions            CONTINUE taking these medications      ARIPiprazole 5 MG tablet  Commonly known as: ABILIFY     Dexcom G6  Mollie Stephen  1 Device by Does not apply route 4 times daily (before meals and nightly)     Dexcom G6 Sensor Misc  1 Device by Does not apply route every 14 days     Dexcom G6 Transmitter Misc  1 Device by Does not apply route every 3 months     empagliflozin 25 MG tablet  Commonly known as: Jardiance  TAKE 1 TABLET BY MOUTH ONCE DAILY. EYE VITAMINS PO     FISH OIL PO     furosemide 20 MG tablet  Commonly known as: LASIX  Take one daily x 2 weeks then as needed for weight gain     Insulin Glargine (2 Unit Dial) 300 UNIT/ML Sopn  Change to 85 U in am , 85 U PM     levothyroxine 50 MCG tablet  Commonly known as: SYNTHROID  TAKE 1 TABLET BY MOUTH ONCE DAILY. lisinopril 10 MG tablet  Commonly known as: PRINIVIL;ZESTRIL  Take 1 tablet by mouth daily     metoprolol tartrate 50 MG tablet  Commonly known as: LOPRESSOR  TAKE 1 TABLET BY MOUTH 2 TIMES DAILY. nitroGLYCERIN 0.3 MG SL tablet  Commonly known as: Nitrostat  Place 1 tablet under the tongue every 5 minutes as needed for Chest pain up to max of 3 total doses. If no relief after 1 dose, call 911. PROBIOTIC PO     sertraline 100 MG tablet  Commonly known as: ZOLOFT     TRUEplus Pen Needles 31G X 8 MM Misc  Generic drug: Insulin Pen Needle  USE AS DIRECTED     Trulicity 4.5 YB/4.2MG Sopn  Generic drug: Dulaglutide  INJECT 4.5 MG INTO THE SKIN ONCE A WEEK .                Where to Get Your Medications        These medications were sent to [address] [place] [address] [phone]  [address] [place] [address]      Phone: [phone]   ascorbic acid 500 MG tablet  aspirin 81 MG chewable tablet  atorvastatin 80 MG tablet  ticagrelor 90 MG Tabs tablet  Vitamin D3 25 MCG Tabs       You can get these medications from any pharmacy    You don't need a prescription for these medications  zinc sulfate 220 (50 Zn) MG capsule      Physical Exam:    BP (!) 154/63   Pulse 81   Temp 97.9 °F (36.6 °C) (Oral) Resp 16   Ht 5' 6\" (1.676 m)   Wt 231 lb (104.8 kg)   SpO2 100%   BMI 37.28 kg/m²       General appearance: No apparent distress, appears stated age. Eyes:  Pupils equal, round, and reactive to light. Conjunctivae/corneas clear. HENT: Head normal in appearance. External nares normal.  Oral mucosa moist without lesions. Hearing grossly intact. Neck: Supple, with full range of motion. Trachea midline. No gross JVD appreciated. Respiratory:  Normal respiratory effort. Clear to auscultation, bilaterally without rales or wheezes or rhonchi. Cardiovascular: tachycardic rate, regular rhythm with normal S1/S2 without murmurs. No lower extremity edema. Abdomen: Soft, non-tender, non-distended with normal bowel sounds. Musculoskeletal: aka amputation of right leg, tenderness to palpation on chest  Skin: Warm and dry. No rashes or lesions. Neurologic:  No focal sensory/motor deficits in the upper and lower extremities. Cranial nerves:  grossly non-focal 2-12. Psychiatric: Alert and oriented, normal insight and thought content. Capillary Refill: Brisk,< 3 seconds. Peripheral Pulses: +2 palpable, equal bilaterally. Labs:     Recent Labs     11/24/22  0451 11/26/22  0541   WBC 7.5 9.5   HGB 14.1 14.6   HCT 40.6* 42.3    185       Recent Labs     11/23/22  1553 11/24/22  0451 11/26/22  0541    133* 138   K 4.0 4.2 4.2   CL 97* 98 101   CO2 26 26 26   BUN 12 17 9   CREATININE 1.0 0.8 0.7   CALCIUM 8.9 8.4* 9.4       Recent Labs     11/24/22  0451   AST 27   ALT 20   BILITOT 0.4   ALKPHOS 68       No results for input(s): INR in the last 72 hours. No results for input(s): Annabel Earnest in the last 72 hours.   Lab Results   Component Value Date/Time    NITRU NEGATIVE 07/14/2022 10:30 PM    WBCUA 0-2 07/14/2022 10:30 PM    BACTERIA NONE SEEN 07/14/2022 10:30 PM    RBCUA 0-2 07/14/2022 10:30 PM    BLOODU NEGATIVE 07/14/2022 10:30 PM    SPECGRAV 1.025 07/27/2021 10:00 PM    GLUCOSEU 250 07/14/2022 10:30 PM         Radiology:     CTA CHEST W WO CONTRAST   Final Result    No pulmonary emboli or pulmonary infiltrates. **This report has been created using voice recognition software. It may contain minor errors which are inherent in voice recognition technology. **      Final report electronically signed by Dr. Brii Benavidez on 11/23/2022 11:22 AM      XR CHEST (2 VW)   Final Result   Stable radiographic appearance of the chest. No evidence of an acute process. **This report has been created using voice recognition software. It may contain minor errors which are inherent in voice recognition technology. **      Final report electronically signed by Dr. Brii Benavidez on 11/23/2022 9:31 AM             EKG:  I have reviewed the EKG with the following interpretation:   Sinus tachycardia             Electronically signed by Tani Palacios MD on 11/26/2022 at 11:20 AM.

## 2022-11-28 ENCOUNTER — TELEPHONE (OUTPATIENT)
Dept: FAMILY MEDICINE CLINIC | Age: 52
End: 2022-11-28

## 2022-11-28 ENCOUNTER — CARE COORDINATION (OUTPATIENT)
Dept: CASE MANAGEMENT | Age: 52
End: 2022-11-28

## 2022-11-28 NOTE — CARE COORDINATION
Care Transitions Outreach Attempt    Call within 2 business days of discharge: Yes     Patient: Paramjit Bacon Patient : 1970 MRN: [id number]    Last Discharge  Street       Date Complaint Diagnosis Description Type Department Provider    22 Chest Pain COVID-19 virus infection . .. ED to Hosp-Admission (Discharged) (ADMITTED) [address], Phs/Ihs MD Darcy; Юлия Mccarthy, ... Was this an external facility discharge? No [facility]    Noted following upcoming appointments from discharge chart review:   [facility] follow up appointment(s):   Future Appointments   Date Time Provider Ebenezer Carmichael   2022 10:30 AM Jose Catherine MD [facility] - [id number]   2023 10:00 AM Francis Quezada RN [address] [facility]     Unsuccessful attempt to reach for Covid 19 Monitoring discharge call. HIPAA compliant message left requesting call back. Episode closed per protocol, no further outreach scheduled. UTR letter to Patient via My Chart. Challenges to be reviewed by the provider   Additional needs identified to be addressed with provider :    NSTEMI s/p PCI to RCA on 22. Please contact Patient for scheduling of 7 day hosp/TCM appt.     No   Method of communication with provider : staff message

## 2022-11-28 NOTE — PROGRESS NOTES
Inpatient Cardiac Rehabilitation Consult    Received consult for Phase II Cardiac Rehabilitation. Patient needs cardiac rehab due to NSTEMI, PCI on 11/25/22. Will call pt at home to complete cardiac rehab education and schedule.

## 2022-11-28 NOTE — TELEPHONE ENCOUNTER
Care Transitions Initial Follow Up Call    Outreach made within 2 business days of discharge: Yes    Patient: Halie Kramer Patient : 1970   MRN: [id number]  Reason for Admission: There are no discharge diagnoses documented for the most recent discharge.   Discharge Date: 22       Spoke with: [facility]    Discharge department/facility: [facility]        Scheduled appointment with PCP within 7-14 days    Follow Up  Future Appointments   Date Time Provider Ebenezer Carmichael   2022 10:30 AM Chapito Ricardo MD [facility] [place] [address]   2023 10:00 AM Gremain Morrow RN [facility],[address], [address] [facility]

## 2022-11-29 NOTE — TELEPHONE ENCOUNTER
Care Transitions Initial Follow Up Call    Outreach made within 2 business days of discharge: Yes    Patient: Eldon Severance Patient : 1970   MRN: [id number]  Reason for Admission: There are no discharge diagnoses documented for the most recent discharge. Discharge Date: 22       Spoke with: Alec Goff    Discharge department/facility: [facility]    TCM Interactive Patient Contact:  Was patient able to fill all prescriptions: No:   Was patient instructed to bring all medications to the follow-up visit: Yes  Is patient taking all medications as directed in the discharge summary? No  Does patient understand their discharge instructions: Yes  Does patient have questions or concerns that need addressed prior to 7-14 day follow up office visit:   yes - medications [facility] to wrong pharmacy [facility]"; needs [facility].     Scheduled appointment with PCP within 7-14 days    Follow Up  Future Appointments   Date Time Provider Ebenezer Carmichael   2022 10:30 AM MD ARIEL [facility] - [place]   2023 10:00 AM Nesha Bethea RN [facility] [place], [place]

## 2022-11-29 NOTE — TELEPHONE ENCOUNTER
Called pt to do ANYA. Pt stated that medications were sent to wrong pharmacy \"[facility]"; needs [facility] pharmacy. Can contact pt at [phone].

## 2022-11-29 NOTE — TELEPHONE ENCOUNTER
Patient was admitted 11/23-11/26- upon discharge his meds were sent to wrong pharmacy. Can you please review new meds and sign for them going to correct pharm?      Thank you

## 2022-11-30 RX ORDER — FUROSEMIDE 20 MG/1
TABLET ORAL
Qty: 30 TABLET | Refills: 2
Start: 2022-11-30

## 2022-11-30 RX ORDER — ATORVASTATIN CALCIUM 80 MG/1
80 TABLET, FILM COATED ORAL NIGHTLY
Qty: 30 TABLET | Refills: 3 | Status: SHIPPED | OUTPATIENT
Start: 2022-11-30

## 2022-11-30 RX ORDER — ASPIRIN 81 MG/1
81 TABLET, CHEWABLE ORAL DAILY
Qty: 30 TABLET | Refills: 3 | Status: SHIPPED | OUTPATIENT
Start: 2022-11-30

## 2022-11-30 RX ORDER — ASCORBIC ACID 500 MG
500 TABLET ORAL DAILY
Qty: 30 TABLET | Refills: 3 | Status: SHIPPED | OUTPATIENT
Start: 2022-11-30

## 2022-11-30 RX ORDER — ZINC SULFATE 50(220)MG
50 CAPSULE ORAL DAILY
Qty: 30 CAPSULE | Refills: 3 | COMMUNITY
Start: 2022-11-30

## 2022-11-30 NOTE — PROGRESS NOTES
[facility] PROFESSIONAL SERVICES  [facility]   [address]   [address] [address]   Dept: [phone]   Dept Fax: [phone]   Loc: [phone]      Chief Complaint   Patient presents with    Follow-up     Nstemi       Cardiologist:  Dr. Rigoberto Gardner  46 yo male presents for hfu for NSTEMI s/p PCI RCA. Had lactic acidosis, +COVID. EF 55%, HTN, HLD, DM, hx of right AKA, + COVID. Not a lot of difference since having stent placed. Some chest pain, SOB, no dizziness, no swelling. Sob is worse with lying flat on his back. Does have sleep apnea untreated. Had study done years ago and stopped breathing 39 times overnight. Did not want cpap. Has not been taking brilitna since the hospital, planning to  rx today. Having some dark urine despite no blood thinner. Started last night. General:   No fever, no chills, no weight loss, no fatigue  Pulmonary:    No dyspnea, no wheezing  Cardiac:    Denies recent chest pain   GI:     No nausea or vomiting, no abdominal pain  Neuro:      No dizziness or light headedness  Musculoskeletal:  No recent active issues  Extremities:   No edema      Past Medical History:   Diagnosis Date    Atherosclerotic heart disease of native coronary artery with unspecified angina pectoris 1/18/2022    Bipolar 2 disorder (HCC)     previously followed with Dr. Nick Claire and Servando Downs in [facility]    BPH (benign prostatic hyperplasia)     COPD (chronic obstructive pulmonary disease) ([facility] [address]     Diabetes mellitus type 2, uncontrolled     HgbA1c on 4/2/2019 was 9.1.     Diabetic peripheral neuropathy (HCC)     Diabetic polyneuropathy ([address]     Diabetic ulcer of right foot associated with type 2 diabetes mellitus ([address] 12/10/2015    Essential hypertension     \"never been on b/p medication that I know of\"    GERD (gastroesophageal reflux disease)     Hammer toe of left foot     Heart murmur     denies any chest pain or palpitations    History of tobacco abuse     Hx of AKA (above knee amputation), right ([facility] [address] 04/18/2019    Dr. Anthony Perez    Hyperlipidemia     Macular edema, diabetic, bilateral ([facility] [address] 05/04/2018    Dr. 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 Has TOLERATED amoxicillin and several different cephalosporins. Actos [Pioglitazone] Swelling    Clindamycin/Lincomycin Itching    Vancomycin Hives      Rash, with erythematous plaques to abdomen, shoulders, and proximal upper extremities with pruritis, persisted with 2nd dose administered slower.       Metformin And Related Swelling       Current Outpatient Medications   Medication Sig Dispense Refill    ticagrelor (BRILINTA) 90 MG TABS tablet Take 1 tablet by mouth 2 times daily 60 tablet 5    ascorbic acid (VITAMIN C) 500 MG tablet Take 1 tablet by mouth daily 30 tablet 3    atorvastatin (LIPITOR) 80 MG tablet Take 1 tablet by mouth at bedtime 30 tablet 3    aspirin 81 MG chewable tablet Take 1 tablet by mouth daily 30 tablet 3    zinc sulfate (ZINCATE) 220 (50 Zn) MG capsule Take 1 capsule by mouth daily 30 capsule 3    furosemide (LASIX) 20 MG tablet TAKE 1 TABLET BY MOUTH ONCE DAILY FOR 2 WEEKS THEN TAKE AS NEEDED FOR WEIGHT GAIN 30 tablet 2    Cholecalciferol (VITAMIN D) 25 MCG TABS Take 1 tablet by mouth daily 60 tablet 0    insulin lispro (HUMALOG KWIKPEN U-200) 200 UNIT/ML SOPN pen 40 units Plus scale before meals; only takes if BG >200 - Max dose 160 units per day (Patient taking differently: 40 units Plus scale before meals; only takes if BG >200 - Max dose 160 units per day     States not doing sliding scale) 30 Adjustable Dose Pre-filled Pen Syringe 3    metoprolol tartrate (LOPRESSOR) 50 MG tablet TAKE 1 TABLET BY MOUTH 2 TIMES DAILY. 90 tablet 3    TRULICITY 4.5 ZB/9.2TI SOPN INJECT 4.5 MG INTO THE SKIN ONCE A WEEK . 2 mL 3    levothyroxine (SYNTHROID) 50 MCG tablet TAKE 1 TABLET BY MOUTH ONCE DAILY. 90 tablet 1    Multiple Vitamins-Minerals (EYE VITAMINS PO) Take 1 tablet by mouth daily      Omega-3 Fatty Acids (FISH OIL PO) Take 1 caplet by mouth daily      Probiotic Product (PROBIOTIC PO) Take 1 tablet by mouth daily      lisinopril (PRINIVIL;ZESTRIL) 10 MG tablet Take 1 tablet by mouth daily 90 tablet 1    Continuous Blood Gluc Sensor (DEXCOM G6 SENSOR) MISC 1 Device by Does not apply route every 14 days 9 each 3    Continuous Blood Gluc Transmit (DEXCOM G6 TRANSMITTER) MISC 1 Device by Does not apply route every 3 months 1 each 3    empagliflozin (JARDIANCE) 25 MG tablet TAKE 1 TABLET BY MOUTH ONCE DAILY. 30 tablet 5    Insulin Glargine, 2 Unit Dial, 300 UNIT/ML SOPN Change to 85 U in am , 85 U PM 15 pen 1    nitroGLYCERIN (NITROSTAT) 0.3 MG SL tablet Place 1 tablet under the tongue every 5 minutes as needed for Chest pain up to max of 3 total doses. If no relief after 1 dose, call 911. 30 tablet 3    Insulin Pen Needle (TRUEPLUS PEN NEEDLES) 31G X 8 MM MISC USE AS DIRECTED 200 each 1    sertraline (ZOLOFT) 100 MG tablet Take 100 mg by mouth daily       Continuous Blood Gluc  (DEXCOM G6 ) LIANNE 1 Device by Does not apply route 4 times daily (before meals and nightly) 1 Device 0    ARIPiprazole (ABILIFY) 5 MG tablet Take 5 mg by mouth daily       gabapentin (NEURONTIN) 400 MG capsule Change to 400 mg TID (Patient taking differently: 600 mg. Change to 600mg TID) 90 capsule 1     No current facility-administered medications for this visit.        Social History     Socioeconomic History Marital status:     Number of children: 2    Years of education: 15   Tobacco Use    Smoking status: Former     Packs/day: 0.00     Years: 10.00     Pack years: 0.00     Types: Cigars, Cigarettes     Start date: 1985     Quit date:      Years since quittin.2    Smokeless tobacco: Never   Vaping Use    Vaping Use: Former    Substances: Nicotine, Flavoring   Substance and Sexual Activity    Alcohol use: Not Currently     Alcohol/week: 0.0 standard drinks    Drug use: No     Comment: 30 YEARS AGO    Sexual activity: Yes     Partners: Female     Social Determinants of Health     Financial Resource Strain: Low Risk     Difficulty of Paying Living Expenses: Not hard at all   Food Insecurity: No Food Insecurity    Worried About Running Out of Food in the Last Year: Never true    Ran Out of Food in the Last Year: Never true       Family History   Problem Relation Age of Onset    Diabetes Mother     Other Mother         pneumonia, H1N1    Depression Mother     Early Death Mother     High Blood Pressure Mother     High Cholesterol Mother     Vision Loss Maternal Grandmother     Arthritis Maternal Grandfather     Heart Disease Maternal Grandfather        Blood pressure (!) 140/70, pulse (!) 106, height 5' 6\" (1.676 m), weight 224 lb (101.6 kg). General:   Well developed, well nourished  Lungs:   Clear to auscultation, no rales  Heart:    RRR, Normal S1 S2, No murmur, rubs, or gallops  Abdomen:   Soft, non tender, no organomegalies, positive bowel sounds  Extremities:   No edema, no cyanosis, good peripheral pulses  Neurological:   Awake, alert, oriented. No obvious focal deficits  Musculoskeletal:  No obvious deformities    EKG:     CORONARY ANGIOGRAM:  LEFT MAIN:  Patent without any significant obstruction. LAD:  Mild luminal irregularities but patent without any significant  obstruction. Diagonal 1 and diagonal 2 branches without any  obstruction.      LCX:  Gives rise to large OM1, OM2 system without any obstruction. At  the takeoff of OM1, there is about 30 to 40% stenosis in AV groove of  circ. RCA:  Mid 80 to 90% stenosis. Gives rise to PDA and PLV both without  any obstruction. RCA is dominant    SUMMARY:  Successful PCI of the RCA with one drug-eluting stent. PLAN:  1. Bedrest.  2.  Optimal medical therapy. 3.  Risk factor management. 4.  Routine access site care. 5.  DAPT. 6.  IV fluids. 7.  Continue management of the patient's chronic medical issue. 8.  Uptitrate antianginals. 9.  Guideline directed therapy for CAD/ACS. 10.  Follow up with myself or primary cardiologist in three to four  weeks postprocedure. All the above was explained to the patient and the patient's family. They are agreeable and amenable to plan. Valencia Moran MD     D: 11/25/2022   Diagnosis Orders   1. NSTEMI (non-ST elevated myocardial infarction) ([facility] [address]  EKG 12 Lead      2. Atherosclerosis of native coronary artery of native heart with angina pectoris (HCC)  EKG 12 Lead      3. Essential hypertension  EKG 12 Lead      4. Hematuria, unspecified type  Urinalysis with Microscopic          Orders Placed This Encounter   Procedures    Urinalysis with Microscopic     Standing Status:   Future     Standing Expiration Date:   12/1/2023     Order Specific Question:   SPECIFY(EX-CATH,MIDSTREAM,CYSTO,ETC)? Answer:   midstream    EKG 12 Lead     Order Specific Question:   Reason for Exam?     Answer: Other         Assessment/Plan:   NSTEMI s/p PCI to rca- continue GDMT with brilinta, asa, statin, bb, lasix, ace. Understands importance of brilinta and asa for heart stent. Will start taking. Need to check UA for hematuria. Consider plavix, however patient not even taking brilinta currently and having possible hematuria. Hematuria-as above   HTN-systolic slightly high today. 140/70. Continue current treatment. Increasing further adjustments at f/u if needed. Preserved EF  MURALI-untreated. Understands this is likely cause of his symptoms while laying down.      Disposition:   3 months  Follow up with Dr Gerard Deal as scheduled or sooner if needed

## 2022-12-01 ENCOUNTER — HOSPITAL ENCOUNTER (OUTPATIENT)
Age: 52
Discharge: HOME | End: 2022-12-01
Payer: MEDICARE

## 2022-12-01 ENCOUNTER — OFFICE VISIT (OUTPATIENT)
Dept: CARDIOLOGY CLINIC | Age: 52
End: 2022-12-01
Payer: MEDICARE

## 2022-12-01 ENCOUNTER — TELEPHONE (OUTPATIENT)
Dept: CARDIOLOGY CLINIC | Age: 52
End: 2022-12-01

## 2022-12-01 VITALS
HEART RATE: 106 BPM | WEIGHT: 224 LBS | SYSTOLIC BLOOD PRESSURE: 140 MMHG | BODY MASS INDEX: 36 KG/M2 | HEIGHT: 66 IN | DIASTOLIC BLOOD PRESSURE: 70 MMHG

## 2022-12-01 DIAGNOSIS — R31.9 HEMATURIA, UNSPECIFIED TYPE: ICD-10-CM

## 2022-12-01 DIAGNOSIS — I21.4 NSTEMI (NON-ST ELEVATED MYOCARDIAL INFARCTION) (HCC): Primary | ICD-10-CM

## 2022-12-01 DIAGNOSIS — I10 ESSENTIAL HYPERTENSION: ICD-10-CM

## 2022-12-01 DIAGNOSIS — I25.119 ATHEROSCLEROSIS OF NATIVE CORONARY ARTERY OF NATIVE HEART WITH ANGINA PECTORIS (HCC): ICD-10-CM

## 2022-12-01 LAB
BACTERIA: ABNORMAL
BILIRUBIN URINE: NEGATIVE
BLOOD, URINE: NEGATIVE
CASTS: ABNORMAL /LPF
CASTS: ABNORMAL /LPF
CHARACTER, URINE: CLEAR
COLOR: YELLOW
CRYSTALS: ABNORMAL
EPITHELIAL CELLS, UA: ABNORMAL /HPF
GLUCOSE, URINE: 100 MG/DL
KETONES, URINE: ABNORMAL
LEUKOCYTE ESTERASE, URINE: NEGATIVE
MISCELLANEOUS LAB TEST RESULT: ABNORMAL
NITRITE, URINE: NEGATIVE
PH UA: 5 (ref 5–9)
PROTEIN UA: NEGATIVE MG/DL
RBC URINE: ABNORMAL /HPF
RENAL EPITHELIAL, UA: ABNORMAL
SPECIFIC GRAVITY UA: 1.02 (ref 1–1.03)
UROBILINOGEN, URINE: 1 EU/DL (ref 0–1)
WBC UA: ABNORMAL /HPF
YEAST: ABNORMAL

## 2022-12-01 PROCEDURE — 81001 URINALYSIS AUTO W/SCOPE: CPT

## 2022-12-01 PROCEDURE — G8417 CALC BMI ABV UP PARAM F/U: HCPCS | Performed by: STUDENT IN AN ORGANIZED HEALTH CARE EDUCATION/TRAINING PROGRAM

## 2022-12-01 PROCEDURE — 3017F COLORECTAL CA SCREEN DOC REV: CPT | Performed by: STUDENT IN AN ORGANIZED HEALTH CARE EDUCATION/TRAINING PROGRAM

## 2022-12-01 PROCEDURE — 3074F SYST BP LT 130 MM HG: CPT | Performed by: STUDENT IN AN ORGANIZED HEALTH CARE EDUCATION/TRAINING PROGRAM

## 2022-12-01 PROCEDURE — 1036F TOBACCO NON-USER: CPT | Performed by: STUDENT IN AN ORGANIZED HEALTH CARE EDUCATION/TRAINING PROGRAM

## 2022-12-01 PROCEDURE — 3078F DIAST BP <80 MM HG: CPT | Performed by: STUDENT IN AN ORGANIZED HEALTH CARE EDUCATION/TRAINING PROGRAM

## 2022-12-01 PROCEDURE — G8484 FLU IMMUNIZE NO ADMIN: HCPCS | Performed by: STUDENT IN AN ORGANIZED HEALTH CARE EDUCATION/TRAINING PROGRAM

## 2022-12-01 PROCEDURE — G8428 CUR MEDS NOT DOCUMENT: HCPCS | Performed by: STUDENT IN AN ORGANIZED HEALTH CARE EDUCATION/TRAINING PROGRAM

## 2022-12-01 PROCEDURE — 1111F DSCHRG MED/CURRENT MED MERGE: CPT | Performed by: STUDENT IN AN ORGANIZED HEALTH CARE EDUCATION/TRAINING PROGRAM

## 2022-12-01 PROCEDURE — 93000 ELECTROCARDIOGRAM COMPLETE: CPT | Performed by: STUDENT IN AN ORGANIZED HEALTH CARE EDUCATION/TRAINING PROGRAM

## 2022-12-01 PROCEDURE — 99214 OFFICE O/P EST MOD 30 MIN: CPT | Performed by: STUDENT IN AN ORGANIZED HEALTH CARE EDUCATION/TRAINING PROGRAM

## 2022-12-01 NOTE — TELEPHONE ENCOUNTER
Result note for Urinalysis with Microscopic    ----- Message from Dee Brooks PA-C sent at 12/1/2022 11:08 AM EST -----  No blood noted in the urine. Does have glucose in the urine. Will also forward to PCP Shannon for further management. Patient needs to get brilinta and continue along with asa for sure. No bleeding concerns at this time limited DAPT.

## 2022-12-02 ENCOUNTER — TELEPHONE (OUTPATIENT)
Dept: INTERNAL MEDICINE CLINIC | Age: 52
End: 2022-12-02

## 2022-12-02 NOTE — TELEPHONE ENCOUNTER
Telephone Call      I called and spoke with him , he is not a candidate for Electric wheel chair, he does have good UE strength 5/5 and I have seen   Him using the regular wheel chair without any problems. Recent COVID and URI symptoms , along with CAD/ stents by dr. Trent Ferrer. He has having SOB, and occasional CP, but no fever or chills, admits to taking his meds as directed  And encouraged him to go to the ER if symptoms get worse. This could be COVID   Related but with cardiac risk factors, with DM  Etc. Again this was a phone call today , and   Counseled him on above, and he consented.          Electronically signed by Bijan Juárez MD on 12/2/2022 at 4:43 PM

## 2022-12-07 RX ORDER — DULAGLUTIDE 4.5 MG/.5ML
INJECTION, SOLUTION SUBCUTANEOUS
Qty: 2 ML | Refills: 3 | OUTPATIENT
Start: 2022-12-07

## 2022-12-12 ENCOUNTER — TELEPHONE (OUTPATIENT)
Dept: INTERNAL MEDICINE CLINIC | Age: 52
End: 2022-12-12

## 2022-12-12 NOTE — TELEPHONE ENCOUNTER
Patient called back. He is fine with doing the labs and will go see if it is there at [facility]. He will call me back if there is any issues. No more questions currently at this time.

## 2022-12-12 NOTE — TELEPHONE ENCOUNTER
I called patient to remind him about his Micro Albumin labs. I had to leave a voicemail informing him to call me back and to get the lab done.

## 2022-12-12 NOTE — TELEPHONE ENCOUNTER
Routed by: Roxanne Troncoso, [address]Regina Jenkins                  on Mon Dec 5, 2022 10:47 AM        Routed to: Candance Griffon, MD PriArtemus Adolph     on Mon Dec 05, 2022 10:47 AM         Opened by: Candance Griffon, MD                   on Fri Dec 9, 2022 12:28 PM         Doned by: Candance Griffon, MD                   on Fri Dec 9, 2022 12:28 PM

## 2022-12-19 ENCOUNTER — OFFICE VISIT (OUTPATIENT)
Dept: INTERNAL MEDICINE CLINIC | Age: 52
End: 2022-12-19
Payer: MEDICARE

## 2022-12-19 VITALS
TEMPERATURE: 99.8 F | DIASTOLIC BLOOD PRESSURE: 74 MMHG | WEIGHT: 224 LBS | BODY MASS INDEX: 36.15 KG/M2 | OXYGEN SATURATION: 98 % | HEART RATE: 100 BPM | SYSTOLIC BLOOD PRESSURE: 116 MMHG

## 2022-12-19 DIAGNOSIS — E78.5 HYPERLIPIDEMIA LDL GOAL <70: ICD-10-CM

## 2022-12-19 DIAGNOSIS — E03.9 HYPOTHYROIDISM, UNSPECIFIED TYPE: ICD-10-CM

## 2022-12-19 DIAGNOSIS — Z79.4 TYPE 2 DIABETES MELLITUS WITH HYPERGLYCEMIA, WITH LONG-TERM CURRENT USE OF INSULIN (HCC): Primary | ICD-10-CM

## 2022-12-19 DIAGNOSIS — E11.65 TYPE 2 DIABETES MELLITUS WITH HYPERGLYCEMIA, WITH LONG-TERM CURRENT USE OF INSULIN (HCC): Primary | ICD-10-CM

## 2022-12-19 DIAGNOSIS — I10 ESSENTIAL HYPERTENSION: ICD-10-CM

## 2022-12-19 LAB — HBA1C MFR BLD: 6.5 % (ref 4.3–5.7)

## 2022-12-19 PROCEDURE — 2022F DILAT RTA XM EVC RTNOPTHY: CPT | Performed by: INTERNAL MEDICINE

## 2022-12-19 PROCEDURE — G8484 FLU IMMUNIZE NO ADMIN: HCPCS | Performed by: INTERNAL MEDICINE

## 2022-12-19 PROCEDURE — G8427 DOCREV CUR MEDS BY ELIG CLIN: HCPCS | Performed by: INTERNAL MEDICINE

## 2022-12-19 PROCEDURE — 1111F DSCHRG MED/CURRENT MED MERGE: CPT | Performed by: INTERNAL MEDICINE

## 2022-12-19 PROCEDURE — 3074F SYST BP LT 130 MM HG: CPT | Performed by: INTERNAL MEDICINE

## 2022-12-19 PROCEDURE — 99214 OFFICE O/P EST MOD 30 MIN: CPT | Performed by: INTERNAL MEDICINE

## 2022-12-19 PROCEDURE — 3017F COLORECTAL CA SCREEN DOC REV: CPT | Performed by: INTERNAL MEDICINE

## 2022-12-19 PROCEDURE — 83036 HEMOGLOBIN GLYCOSYLATED A1C: CPT | Performed by: INTERNAL MEDICINE

## 2022-12-19 PROCEDURE — G8417 CALC BMI ABV UP PARAM F/U: HCPCS | Performed by: INTERNAL MEDICINE

## 2022-12-19 PROCEDURE — 3044F HG A1C LEVEL LT 7.0%: CPT | Performed by: INTERNAL MEDICINE

## 2022-12-19 PROCEDURE — 1036F TOBACCO NON-USER: CPT | Performed by: INTERNAL MEDICINE

## 2022-12-19 PROCEDURE — 3078F DIAST BP <80 MM HG: CPT | Performed by: INTERNAL MEDICINE

## 2022-12-19 RX ORDER — FUROSEMIDE 20 MG/1
TABLET ORAL
Qty: 45 TABLET | Refills: 5 | Status: SHIPPED | OUTPATIENT
Start: 2022-12-19

## 2022-12-19 RX ORDER — ASPIRIN 81 MG/1
81 TABLET, CHEWABLE ORAL DAILY
Qty: 30 TABLET | Refills: 5 | Status: CANCELLED | OUTPATIENT
Start: 2022-12-19

## 2022-12-19 RX ORDER — DULAGLUTIDE 4.5 MG/.5ML
INJECTION, SOLUTION SUBCUTANEOUS
Qty: 2 ML | Refills: 5 | Status: SHIPPED | OUTPATIENT
Start: 2022-12-19

## 2022-12-19 RX ORDER — GABAPENTIN 600 MG/1
TABLET ORAL
COMMUNITY
Start: 2022-12-07

## 2022-12-19 RX ORDER — LEVOTHYROXINE SODIUM 0.05 MG/1
TABLET ORAL
Qty: 90 TABLET | Refills: 1 | Status: SHIPPED | OUTPATIENT
Start: 2022-12-19

## 2022-12-19 RX ORDER — METOPROLOL TARTRATE 50 MG/1
50 TABLET, FILM COATED ORAL 2 TIMES DAILY
Qty: 180 TABLET | Refills: 1 | Status: SHIPPED | OUTPATIENT
Start: 2022-12-19

## 2022-12-19 RX ORDER — ATORVASTATIN CALCIUM 80 MG/1
80 TABLET, FILM COATED ORAL NIGHTLY
Qty: 30 TABLET | Refills: 5 | Status: SHIPPED | OUTPATIENT
Start: 2022-12-19

## 2022-12-19 RX ORDER — LISINOPRIL 10 MG/1
10 TABLET ORAL DAILY
Qty: 90 TABLET | Refills: 1 | Status: SHIPPED | OUTPATIENT
Start: 2022-12-19

## 2022-12-19 NOTE — PROGRESS NOTES
[address] [facility]  [facility] [address] [place], [address] [facility]  Dept: [phone]  Dept Fax: [phone]    YOB: 1970    Augusto Sever is here for follow up of DM -2 ,   Dr. Kip Sweeney has done the surgery   Back on Nov 21 , and completed the antibiotics. Overall the LLE site healed well. He gets around in a wheel chair, A1c today is 6.5 Z%, which is great . he is off the omnipod and using the humalog and glargine insulin with good results. And he does have dex com. , no recent LOC, no fever or chills. Got his covid booster dose. .  . no recent low sugars, but the dexcom average is 195   over the last 30  days ,  45 % in the range. , 17 % in high range. He lives alone, claims makes his  Own food, recommend cut down the portions, and eat more veggies   And less carb intake. Seen dietician in the past. Had covid Vaccine   And had covid . He gets around in a wheel chair, good UE skills. He checks sugars every 1-2 hrs . He does have Right AKA back on 2016. , down in [place]. Claims he does not drink pop. He does have one cardiac stent since the last visit. , PCI of the RCA  With one OLEG, followed by dr. Dorys Coe . , recent Echo EF is normal at  55 %     He claims he had EGD recently by dr. Zavaleta Justice had colon in the past. No recent fever or chills. He complains of feeling hot , even in   [facility] room. Patient Active Problem List   Diagnosis    Status post below knee amputation of right lower extremity (HCC)    Gait disturbance    Sebaceous cyst    Uncontrolled type 2 diabetes mellitus with hyperglycemia, with long-term current use of insulin (HCC)    Severe bipolar II disorder, recent episode major depressive, remission (HCC)    Bipolar 1 disorder (HCC)    Leukocytosis    Lactic acidosis    Hyponatremia    Normocytic anemia    Obesity (BMI 30-39. 9)    History of noncompliance with medical treatment    Essential hypertension    Tobacco dependence    Gastroesophageal reflux disease without esophagitis    History of marijuana use    Physical debility    Anemia    Electrolyte imbalance    Type 2 diabetes mellitus with hyperglycemia, with long-term current use of insulin (HCC)    Weakness    Infection of above knee amputation stump of right leg (HCC)    Above knee amputation of right lower extremity (HCC)    Vitamin D deficiency    COPD exacerbation (HCC)    Influenza B    Depression, recurrent (HCC)    Major depressive disorder, recurrent (HCC)    GI bleed    Elevated lipase    Other psychoactive substance abuse, uncomplicated (HCC)    Calculus of gallbladder without cholecystitis without obstruction    Right upper quadrant abdominal pain    Pedal edema    MURALI (obstructive sleep apnea)    Shortness of breath    Adult hypothyroidism    Anxiety and depression    Dyspnea    Sinus tachycardia    Metabolic acidosis    Edema of left lower extremity    SOB (shortness of breath) on exertion    Intermittent palpitations    History of chest pain    Dizziness, nonspecific    Hyperlipidemia    Neuropathy    NSTEMI (non-ST elevated myocardial infarction) ([facility] [address]    Venous stasis ulcer of left lower leg with edema of left lower leg (HCC)    Angina pectoris, unspecified    Atherosclerotic heart disease of native coronary artery with unspecified angina pectoris    Chronic diarrhea    Lower abdominal pain    Chest pain on exertion    Above knee amputation of left lower extremity (HCC)    COVID-19 virus infection       Current Outpatient Medications   Medication Sig Dispense Refill    gabapentin (NEURONTIN) 600 MG tablet       lisinopril (PRINIVIL;ZESTRIL) 10 MG tablet Take 1 tablet by mouth daily 90 tablet 1    levothyroxine (SYNTHROID) 50 MCG tablet TAKE 1 TABLET BY MOUTH ONCE DAILY. 90 tablet 1    Dulaglutide (TRULICITY) 4.5 BH/5.4ZV SOPN INJECT 4.5 MG INTO THE SKIN ONCE A WEEK .  2 mL 5    Insulin Glargine, 2 Unit Dial, 300 UNIT/ML SOPN Change to 85 U in am , 85 U PM 15 Adjustable Dose Pre-filled Pen Syringe 1    empagliflozin (JARDIANCE) 25 MG tablet TAKE 1 TABLET BY MOUTH ONCE DAILY. 30 tablet 5    furosemide (LASIX) 20 MG tablet TAKE 1 TABLET BY MOUTH ONCE DAILY  THEN TAKE AS NEEDED FOR WEIGHT GAIN 45 tablet 5    atorvastatin (LIPITOR) 80 MG tablet Take 1 tablet by mouth at bedtime 30 tablet 5    metoprolol tartrate (LOPRESSOR) 50 MG tablet Take 1 tablet by mouth 2 times daily 180 tablet 1    ticagrelor (BRILINTA) 90 MG TABS tablet Take 1 tablet by mouth 2 times daily 60 tablet 5    ascorbic acid (VITAMIN C) 500 MG tablet Take 1 tablet by mouth daily 30 tablet 3    aspirin 81 MG chewable tablet Take 1 tablet by mouth daily 30 tablet 3    zinc sulfate (ZINCATE) 220 (50 Zn) MG capsule Take 1 capsule by mouth daily 30 capsule 3    Cholecalciferol (VITAMIN D) 25 MCG TABS Take 1 tablet by mouth daily 60 tablet 0    insulin lispro (HUMALOG KWIKPEN U-200) 200 UNIT/ML SOPN pen 40 units Plus scale before meals; only takes if BG >200 - Max dose 160 units per day (Patient taking differently: 40 units Plus scale before meals; only takes if BG >200 - Max dose 160 units per day     States not doing sliding scale) 30 Adjustable Dose Pre-filled Pen Syringe 3    Multiple Vitamins-Minerals (EYE VITAMINS PO) Take 1 tablet by mouth daily      Omega-3 Fatty Acids (FISH OIL PO) Take 1 caplet by mouth daily      Probiotic Product (PROBIOTIC PO) Take 1 tablet by mouth daily      Continuous Blood Gluc Sensor (DEXCOM G6 SENSOR) MISC 1 Device by Does not apply route every 14 days 9 each 3    Continuous Blood Gluc Transmit (DEXCOM G6 TRANSMITTER) MISC 1 Device by Does not apply route every 3 months 1 each 3    nitroGLYCERIN (NITROSTAT) 0.3 MG SL tablet Place 1 tablet under the tongue every 5 minutes as needed for Chest pain up to max of 3 total doses.  If no relief after 1 dose, call 911. 30 tablet 3    Insulin Pen Needle (TRUEPLUS PEN NEEDLES) 31G X 8 MM MISC USE AS DIRECTED 200 each 1    sertraline (ZOLOFT) 100 MG tablet Take 100 mg by mouth daily       Continuous Blood Gluc  (DEXCOM G6 ) LIANNE 1 Device by Does not apply route 4 times daily (before meals and nightly) 1 Device 0    ARIPiprazole (ABILIFY) 5 MG tablet Take 5 mg by mouth daily        No current facility-administered medications for this visit. Allergies   Allergen Reactions    Pcn [Penicillins] Shortness Of Breath, Nausea And Vomiting and Other (See Comments)     Does not remember reactions. Has TOLERATED amoxicillin and several different cephalosporins. Actos [Pioglitazone] Swelling    Clindamycin/Lincomycin Itching    Vancomycin Hives      Rash, with erythematous plaques to abdomen, shoulders, and proximal upper extremities with pruritis, persisted with 2nd dose administered slower. Metformin And Related Swelling       Review of Systems      see HPI      /74 (Site: Left Upper Arm)   Pulse 100   Temp 99.8 °F (37.7 °C)   Wt 224 lb (101.6 kg)   SpO2 98%   BMI 36.15 kg/m²       Physical Exam:    General appearance - alert, well appearing, and in no distress and overweight, in a wheel chair   Mental status - alert and oriented    Neck - supple, no significant adenopathy  Chest - clear to auscultation, no wheezes, rales or rhonchi, symmetric air entry  Heart - normal rate, regular rhythm, normal S1, S2, no murmurs, rubs, clicks or gallops  Abdomen - soft, nontender, nondistended, no masses or organomegaly  no abdominal bruits. Obese Protuberant: No  Neurological - alert, oriented, normal speech, no focal findings or movement disorder noted, motor and sensory grossly normal bilaterally  Musculoskeletal - no joint tenderness, deformity or swelling  RLE AKA   Extremities - peripheral pulses normal, no pedal edema, no clubbing or cyanosis, Eugene's sign negative LLE,   RLE  AKA ( 2016 )   Prosthesis: No  Skin - normal coloration and turgor,  LLE had an ulcer, completed antibiotics.      I have reviewed recent diagnostic testing including labs Diagnosis Orders   1. Type 2 diabetes mellitus with hyperglycemia, with long-term current use of insulin (HCC)  POCT glycosylated hemoglobin (Hb A1C)    Lipid Panel    Comprehensive Metabolic Panel      2. Essential hypertension  lisinopril (PRINIVIL;ZESTRIL) 10 MG tablet    Comprehensive Metabolic Panel      3. Hypothyroidism, unspecified type  levothyroxine (SYNTHROID) 50 MCG tablet    TSH with Reflex    T4, Free      4. Hyperlipidemia LDL goal <70  Lipid Panel            Plan:    Orders Placed This Encounter   Procedures    Lipid Panel     Check 7-10 days pre office visit     Standing Status:   Future     Standing Expiration Date:   12/19/2023     Order Specific Question:   Is Patient Fasting?/# of Hours     Answer:   yes     Comments:   12 hours    Comprehensive Metabolic Panel     Standing Status:   Future     Standing Expiration Date:   4/19/2023    TSH with Reflex     Standing Status:   Future     Standing Expiration Date:   12/19/2023    T4, Free     All labs in am , fasting     Standing Status:   Future     Standing Expiration Date:   4/19/2023    POCT glycosylated hemoglobin (Hb A1C)         Outpatient Encounter Medications as of 12/19/2022   Medication Sig Dispense Refill    gabapentin (NEURONTIN) 600 MG tablet       lisinopril (PRINIVIL;ZESTRIL) 10 MG tablet Take 1 tablet by mouth daily 90 tablet 1    levothyroxine (SYNTHROID) 50 MCG tablet TAKE 1 TABLET BY MOUTH ONCE DAILY. 90 tablet 1    Dulaglutide (TRULICITY) 4.5 KY/6.5VB SOPN INJECT 4.5 MG INTO THE SKIN ONCE A WEEK . 2 mL 5    Insulin Glargine, 2 Unit Dial, 300 UNIT/ML SOPN Change to 85 U in am , 85 U PM 15 Adjustable Dose Pre-filled Pen Syringe 1    empagliflozin (JARDIANCE) 25 MG tablet TAKE 1 TABLET BY MOUTH ONCE DAILY.  30 tablet 5    furosemide (LASIX) 20 MG tablet TAKE 1 TABLET BY MOUTH ONCE DAILY  THEN TAKE AS NEEDED FOR WEIGHT GAIN 45 tablet 5    atorvastatin (LIPITOR) 80 MG tablet Take 1 tablet by mouth at bedtime 30 tablet 5    metoprolol tartrate (LOPRESSOR) 50 MG tablet Take 1 tablet by mouth 2 times daily 180 tablet 1    ticagrelor (BRILINTA) 90 MG TABS tablet Take 1 tablet by mouth 2 times daily 60 tablet 5    ascorbic acid (VITAMIN C) 500 MG tablet Take 1 tablet by mouth daily 30 tablet 3    aspirin 81 MG chewable tablet Take 1 tablet by mouth daily 30 tablet 3    zinc sulfate (ZINCATE) 220 (50 Zn) MG capsule Take 1 capsule by mouth daily 30 capsule 3    Cholecalciferol (VITAMIN D) 25 MCG TABS Take 1 tablet by mouth daily 60 tablet 0    insulin lispro (HUMALOG KWIKPEN U-200) 200 UNIT/ML SOPN pen 40 units Plus scale before meals; only takes if BG >200 - Max dose 160 units per day (Patient taking differently: 40 units Plus scale before meals; only takes if BG >200 - Max dose 160 units per day     States not doing sliding scale) 30 Adjustable Dose Pre-filled Pen Syringe 3    Multiple Vitamins-Minerals (EYE VITAMINS PO) Take 1 tablet by mouth daily      Omega-3 Fatty Acids (FISH OIL PO) Take 1 caplet by mouth daily      Probiotic Product (PROBIOTIC PO) Take 1 tablet by mouth daily      Continuous Blood Gluc Sensor (DEXCOM G6 SENSOR) MISC 1 Device by Does not apply route every 14 days 9 each 3    Continuous Blood Gluc Transmit (DEXCOM G6 TRANSMITTER) MISC 1 Device by Does not apply route every 3 months 1 each 3    nitroGLYCERIN (NITROSTAT) 0.3 MG SL tablet Place 1 tablet under the tongue every 5 minutes as needed for Chest pain up to max of 3 total doses.  If no relief after 1 dose, call 911. 30 tablet 3    Insulin Pen Needle (TRUEPLUS PEN NEEDLES) 31G X 8 MM MISC USE AS DIRECTED 200 each 1    sertraline (ZOLOFT) 100 MG tablet Take 100 mg by mouth daily       Continuous Blood Gluc  (DEXCOM G6 ) LIANNE 1 Device by Does not apply route 4 times daily (before meals and nightly) 1 Device 0    ARIPiprazole (ABILIFY) 5 MG tablet Take 5 mg by mouth daily       [DISCONTINUED] atorvastatin (LIPITOR) 80 MG tablet Take 1 tablet by mouth at bedtime 30 tablet 3    [DISCONTINUED] furosemide (LASIX) 20 MG tablet TAKE 1 TABLET BY MOUTH ONCE DAILY FOR 2 WEEKS THEN TAKE AS NEEDED FOR WEIGHT GAIN 30 tablet 2    [DISCONTINUED] metoprolol tartrate (LOPRESSOR) 50 MG tablet TAKE 1 TABLET BY MOUTH 2 TIMES DAILY. 90 tablet 3    [DISCONTINUED] TRULICITY 4.5 OE/8.9ZK SOPN INJECT 4.5 MG INTO THE SKIN ONCE A WEEK . 2 mL 3    [DISCONTINUED] levothyroxine (SYNTHROID) 50 MCG tablet TAKE 1 TABLET BY MOUTH ONCE DAILY. 90 tablet 1    [DISCONTINUED] lisinopril (PRINIVIL;ZESTRIL) 10 MG tablet Take 1 tablet by mouth daily 90 tablet 1    [DISCONTINUED] empagliflozin (JARDIANCE) 25 MG tablet TAKE 1 TABLET BY MOUTH ONCE DAILY. 30 tablet 5    [DISCONTINUED] gabapentin (NEURONTIN) 400 MG capsule Change to 400 mg TID (Patient taking differently: 600 mg. Change to 600mg TID) 90 capsule 1    [DISCONTINUED] Insulin Glargine, 2 Unit Dial, 300 UNIT/ML SOPN Change to 85 U in am , 85 U PM 15 pen 1     No facility-administered encounter medications on file as of 12/19/2022. Controlled Substances Monitoring:       Diagnosis Orders   1. Type 2 diabetes mellitus with hyperglycemia, with long-term current use of insulin (HCC)  POCT glycosylated hemoglobin (Hb A1C)    Lipid Panel    Comprehensive Metabolic Panel      2. Essential hypertension  lisinopril (PRINIVIL;ZESTRIL) 10 MG tablet    Comprehensive Metabolic Panel      3. Hypothyroidism, unspecified type  levothyroxine (SYNTHROID) 50 MCG tablet    TSH with Reflex    T4, Free      4. Hyperlipidemia LDL goal <70  Lipid Panel          Will schedule follow up appointment in 3- 4 months     Plan:        DM-2   Sugars are great  with todays A1c of  6.5  % ,  continue with regimen of insulin and trulicity as outlined below. compliance with diet and life style modification, and meds has been highly promoted. No change in insulin regimen, follow up closely with our diabetic clinic.  Increase basaglar to 90 Units am, and evening current dose of 85 U will Increase  to 90 Units in 10 days providing he does not have hypoglycemia. On max dose of Trulicity  4.5 Q weekly, and titrate as tolerated for optimal sugars. And on max dose of Jardiance 25 mg daily . Overall we are pleased with his blood sugars. And recommended dietary consult, and follow up with Emani Soto / Gagan Coles in our  Diabetic clinic, in 6-8 weeks. Strong dietary and  life style changes and regular exercise was recommended, and to lead an active life style . Diabetic education material was provided to the patient on this visit. Pt was counseled extensively on both   Diet and exercise regimen today , along with healthy living with diabetes. HTN is stable on the current regimen on  BB and ACE no changes. Pt with diabetic neuropathy - taking 300 mg TID,  And also seeing dr. Danuta Alford , PM/R doc for MRI of the neck per his recommendations . dr. Antonietta Wall for  diabetic foot care. And he will get an eye exam later today      CAD being followed by our cardiology colleagues at [facility], encouraged  Compliance with his meds. Re check lipids and other labs pre next visit. Signed by : Lakhwinder Herrera M.D.       [address] [facility]  [address] [facility], [address] [place], [address]    Tel  [phone]  Fax [phone]

## 2022-12-27 RX ORDER — DULAGLUTIDE 4.5 MG/.5ML
INJECTION, SOLUTION SUBCUTANEOUS
Qty: 2 ML | Refills: 3 | OUTPATIENT
Start: 2022-12-27

## 2023-01-17 RX ORDER — INSULIN GLARGINE 300 U/ML
INJECTION, SOLUTION SUBCUTANEOUS
Qty: 42 ML | Refills: 2 | Status: SHIPPED | OUTPATIENT
Start: 2023-01-17

## 2023-01-20 NOTE — PATIENT INSTRUCTIONS
Jorge Shelton is a 71 year old male presenting to the walk-in clinic today  alone for,cough,  Chest congestion, runny nose,and sore throat x 5 days      Treatment tried prior to visit: zinc,vit c     Swabs/Specimens collected during triage process:  None    Patient would like communication of their results via:        Cell Phone:   Telephone Information:   Mobile [phone]     Okay to leave a message containing results? Yes      Per protocol, full PPE attire worn during the care of this patient  Medications verified,no change  Denies known Latex allergy or symptoms of latex sensitivity     Betadine to scabbed areas on fingers, keep clean and dry, open to air.    Warm soapy water to wound on shin, antibiotic ointment and non stick pad to help heal.

## 2023-01-31 ENCOUNTER — HOSPITAL ENCOUNTER (INPATIENT)
Age: 53
LOS: 2 days | Discharge: HOME | DRG: 287 | End: 2023-02-02
Attending: STUDENT IN AN ORGANIZED HEALTH CARE EDUCATION/TRAINING PROGRAM | Admitting: STUDENT IN AN ORGANIZED HEALTH CARE EDUCATION/TRAINING PROGRAM
Payer: MEDICARE

## 2023-01-31 ENCOUNTER — APPOINTMENT (OUTPATIENT)
Dept: GENERAL RADIOLOGY | Age: 53
DRG: 287 | End: 2023-01-31
Payer: MEDICARE

## 2023-01-31 DIAGNOSIS — G47.33 OSA (OBSTRUCTIVE SLEEP APNEA): ICD-10-CM

## 2023-01-31 DIAGNOSIS — R07.9 CHEST PAIN, UNSPECIFIED TYPE: Primary | ICD-10-CM

## 2023-01-31 LAB
ANION GAP SERPL CALC-SCNC: 11 MEQ/L (ref 8–16)
BASOPHILS ABSOLUTE: 0.1 THOU/MM3 (ref 0–0.1)
BASOPHILS NFR BLD AUTO: 0.5 %
BUN SERPL-MCNC: 14 MG/DL (ref 7–22)
CALCIUM SERPL-MCNC: 9.2 MG/DL (ref 8.5–10.5)
CHLORIDE SERPL-SCNC: 94 MEQ/L (ref 98–111)
CO2 SERPL-SCNC: 26 MEQ/L (ref 23–33)
CREAT SERPL-MCNC: 0.8 MG/DL (ref 0.4–1.2)
DEPRECATED RDW RBC AUTO: 45.1 FL (ref 35–45)
EKG ATRIAL RATE: 108 BPM
EKG P AXIS: 51 DEGREES
EKG P-R INTERVAL: 208 MS
EKG Q-T INTERVAL: 334 MS
EKG QRS DURATION: 76 MS
EKG QTC CALCULATION (BAZETT): 447 MS
EKG R AXIS: 12 DEGREES
EKG T AXIS: 54 DEGREES
EKG VENTRICULAR RATE: 108 BPM
EOSINOPHIL NFR BLD AUTO: 3.2 %
EOSINOPHILS ABSOLUTE: 0.4 THOU/MM3 (ref 0–0.4)
ERYTHROCYTE [DISTWIDTH] IN BLOOD BY AUTOMATED COUNT: 13.8 % (ref 11.5–14.5)
GFR SERPL CREATININE-BSD FRML MDRD: > 60 ML/MIN/1.73M2
GLUCOSE BLD STRIP.AUTO-MCNC: 137 MG/DL (ref 70–108)
GLUCOSE SERPL-MCNC: 267 MG/DL (ref 70–108)
HCT VFR BLD AUTO: 42.6 % (ref 42–52)
HGB BLD-MCNC: 15 GM/DL (ref 14–18)
IMM GRANULOCYTES # BLD AUTO: 0.04 THOU/MM3 (ref 0–0.07)
IMM GRANULOCYTES NFR BLD AUTO: 0.4 %
LYMPHOCYTES ABSOLUTE: 2.7 THOU/MM3 (ref 1–4.8)
LYMPHOCYTES NFR BLD AUTO: 24.7 %
MCH RBC QN AUTO: 31.5 PG (ref 26–33)
MCHC RBC AUTO-ENTMCNC: 35.2 GM/DL (ref 32.2–35.5)
MCV RBC AUTO: 89.5 FL (ref 80–94)
MONOCYTES ABSOLUTE: 0.9 THOU/MM3 (ref 0.4–1.3)
MONOCYTES NFR BLD AUTO: 7.9 %
NEUTROPHILS NFR BLD AUTO: 63.3 %
NRBC BLD AUTO-RTO: 0 /100 WBC
NT-PROBNP SERPL IA-MCNC: < 36 PG/ML (ref 0–124)
OSMOLALITY SERPL CALC.SUM OF ELEC: 272.5 MOSMOL/KG (ref 275–300)
PLATELET # BLD AUTO: 225 THOU/MM3 (ref 130–400)
PMV BLD AUTO: 9.4 FL (ref 9.4–12.4)
POTASSIUM SERPL-SCNC: 3.7 MEQ/L (ref 3.5–5.2)
RBC # BLD AUTO: 4.76 MILL/MM3 (ref 4.7–6.1)
SEGMENTED NEUTROPHILS ABSOLUTE COUNT: 7 THOU/MM3 (ref 1.8–7.7)
SODIUM SERPL-SCNC: 131 MEQ/L (ref 135–145)
TROPONIN T: < 0.01 NG/ML
TROPONIN T: < 0.01 NG/ML
WBC # BLD AUTO: 11 THOU/MM3 (ref 4.8–10.8)

## 2023-01-31 PROCEDURE — 82948 REAGENT STRIP/BLOOD GLUCOSE: CPT

## 2023-01-31 PROCEDURE — 99285 EMERGENCY DEPT VISIT HI MDM: CPT

## 2023-01-31 PROCEDURE — 84484 ASSAY OF TROPONIN QUANT: CPT

## 2023-01-31 PROCEDURE — 85025 COMPLETE CBC W/AUTO DIFF WBC: CPT

## 2023-01-31 PROCEDURE — 93005 ELECTROCARDIOGRAM TRACING: CPT | Performed by: STUDENT IN AN ORGANIZED HEALTH CARE EDUCATION/TRAINING PROGRAM

## 2023-01-31 PROCEDURE — 71045 X-RAY EXAM CHEST 1 VIEW: CPT

## 2023-01-31 PROCEDURE — 83880 ASSAY OF NATRIURETIC PEPTIDE: CPT

## 2023-01-31 PROCEDURE — 93010 ELECTROCARDIOGRAM REPORT: CPT | Performed by: INTERNAL MEDICINE

## 2023-01-31 PROCEDURE — 1200000003 HC TELEMETRY R&B

## 2023-01-31 PROCEDURE — 99222 1ST HOSP IP/OBS MODERATE 55: CPT | Performed by: STUDENT IN AN ORGANIZED HEALTH CARE EDUCATION/TRAINING PROGRAM

## 2023-01-31 PROCEDURE — 1200000000 HC SEMI PRIVATE

## 2023-01-31 PROCEDURE — 6360000002 HC RX W HCPCS: Performed by: STUDENT IN AN ORGANIZED HEALTH CARE EDUCATION/TRAINING PROGRAM

## 2023-01-31 PROCEDURE — 6370000000 HC RX 637 (ALT 250 FOR IP): Performed by: STUDENT IN AN ORGANIZED HEALTH CARE EDUCATION/TRAINING PROGRAM

## 2023-01-31 PROCEDURE — 36415 COLL VENOUS BLD VENIPUNCTURE: CPT

## 2023-01-31 PROCEDURE — 96374 THER/PROPH/DIAG INJ IV PUSH: CPT

## 2023-01-31 PROCEDURE — 80048 BASIC METABOLIC PNL TOTAL CA: CPT

## 2023-01-31 RX ORDER — ONDANSETRON 4 MG/1
4 TABLET, ORALLY DISINTEGRATING ORAL EVERY 8 HOURS PRN
Status: DISCONTINUED | OUTPATIENT
Start: 2023-01-31 | End: 2023-02-02 | Stop reason: HOSPADM

## 2023-01-31 RX ORDER — VITAMIN B COMPLEX
1000 TABLET ORAL DAILY
Status: DISCONTINUED | OUTPATIENT
Start: 2023-02-01 | End: 2023-02-02 | Stop reason: HOSPADM

## 2023-01-31 RX ORDER — SODIUM CHLORIDE 0.9 % (FLUSH) 0.9 %
5-40 SYRINGE (ML) INJECTION EVERY 12 HOURS SCHEDULED
Status: DISCONTINUED | OUTPATIENT
Start: 2023-01-31 | End: 2023-02-02 | Stop reason: HOSPADM

## 2023-01-31 RX ORDER — ARIPIPRAZOLE 5 MG/1
5 TABLET ORAL DAILY
Status: DISCONTINUED | OUTPATIENT
Start: 2023-02-01 | End: 2023-02-02 | Stop reason: HOSPADM

## 2023-01-31 RX ORDER — ASPIRIN 81 MG/1
81 TABLET, CHEWABLE ORAL DAILY
Status: DISCONTINUED | OUTPATIENT
Start: 2023-02-01 | End: 2023-02-02 | Stop reason: HOSPADM

## 2023-01-31 RX ORDER — ACETAMINOPHEN 325 MG/1
650 TABLET ORAL ONCE
Status: COMPLETED | OUTPATIENT
Start: 2023-01-31 | End: 2023-01-31

## 2023-01-31 RX ORDER — ONDANSETRON 2 MG/ML
4 INJECTION INTRAMUSCULAR; INTRAVENOUS EVERY 6 HOURS PRN
Status: DISCONTINUED | OUTPATIENT
Start: 2023-01-31 | End: 2023-02-02 | Stop reason: HOSPADM

## 2023-01-31 RX ORDER — ASCORBIC ACID 500 MG
500 TABLET ORAL DAILY
Status: DISCONTINUED | OUTPATIENT
Start: 2023-02-01 | End: 2023-02-02 | Stop reason: HOSPADM

## 2023-01-31 RX ORDER — ACETAMINOPHEN 650 MG/1
650 SUPPOSITORY RECTAL EVERY 6 HOURS PRN
Status: DISCONTINUED | OUTPATIENT
Start: 2023-01-31 | End: 2023-02-02 | Stop reason: HOSPADM

## 2023-01-31 RX ORDER — METOPROLOL TARTRATE 50 MG/1
50 TABLET, FILM COATED ORAL 2 TIMES DAILY
Status: DISCONTINUED | OUTPATIENT
Start: 2023-01-31 | End: 2023-02-02

## 2023-01-31 RX ORDER — INSULIN LISPRO 100 [IU]/ML
40 INJECTION, SOLUTION INTRAVENOUS; SUBCUTANEOUS
Status: DISCONTINUED | OUTPATIENT
Start: 2023-02-01 | End: 2023-02-01

## 2023-01-31 RX ORDER — SERTRALINE HYDROCHLORIDE 100 MG/1
100 TABLET, FILM COATED ORAL DAILY
Status: DISCONTINUED | OUTPATIENT
Start: 2023-02-01 | End: 2023-02-02 | Stop reason: HOSPADM

## 2023-01-31 RX ORDER — ATORVASTATIN CALCIUM 80 MG/1
80 TABLET, FILM COATED ORAL NIGHTLY
Status: DISCONTINUED | OUTPATIENT
Start: 2023-01-31 | End: 2023-02-02 | Stop reason: HOSPADM

## 2023-01-31 RX ORDER — SODIUM CHLORIDE 9 MG/ML
INJECTION, SOLUTION INTRAVENOUS PRN
Status: DISCONTINUED | OUTPATIENT
Start: 2023-01-31 | End: 2023-02-02 | Stop reason: SDUPTHER

## 2023-01-31 RX ORDER — DEXTROSE MONOHYDRATE 100 MG/ML
INJECTION, SOLUTION INTRAVENOUS CONTINUOUS PRN
Status: DISCONTINUED | OUTPATIENT
Start: 2023-01-31 | End: 2023-02-02 | Stop reason: HOSPADM

## 2023-01-31 RX ORDER — MORPHINE SULFATE 2 MG/ML
2 INJECTION, SOLUTION INTRAMUSCULAR; INTRAVENOUS EVERY 4 HOURS PRN
Status: DISCONTINUED | OUTPATIENT
Start: 2023-01-31 | End: 2023-02-02 | Stop reason: HOSPADM

## 2023-01-31 RX ORDER — MORPHINE SULFATE 4 MG/ML
4 INJECTION, SOLUTION INTRAMUSCULAR; INTRAVENOUS ONCE
Status: COMPLETED | OUTPATIENT
Start: 2023-01-31 | End: 2023-01-31

## 2023-01-31 RX ORDER — MULTIVITAMIN WITH IRON
1 TABLET ORAL DAILY
Status: DISCONTINUED | OUTPATIENT
Start: 2023-02-01 | End: 2023-02-02 | Stop reason: HOSPADM

## 2023-01-31 RX ORDER — ACETAMINOPHEN 325 MG/1
650 TABLET ORAL EVERY 6 HOURS PRN
Status: DISCONTINUED | OUTPATIENT
Start: 2023-01-31 | End: 2023-02-02 | Stop reason: HOSPADM

## 2023-01-31 RX ORDER — SODIUM CHLORIDE 0.9 % (FLUSH) 0.9 %
5-40 SYRINGE (ML) INJECTION PRN
Status: DISCONTINUED | OUTPATIENT
Start: 2023-01-31 | End: 2023-02-02 | Stop reason: HOSPADM

## 2023-01-31 RX ORDER — GABAPENTIN 600 MG/1
600 TABLET ORAL 3 TIMES DAILY
Status: DISCONTINUED | OUTPATIENT
Start: 2023-01-31 | End: 2023-02-02 | Stop reason: HOSPADM

## 2023-01-31 RX ORDER — POLYETHYLENE GLYCOL 3350 17 G/17G
17 POWDER, FOR SOLUTION ORAL DAILY PRN
Status: DISCONTINUED | OUTPATIENT
Start: 2023-01-31 | End: 2023-02-02 | Stop reason: HOSPADM

## 2023-01-31 RX ORDER — FUROSEMIDE 20 MG/1
20 TABLET ORAL DAILY
Status: DISCONTINUED | OUTPATIENT
Start: 2023-02-01 | End: 2023-02-02 | Stop reason: HOSPADM

## 2023-01-31 RX ORDER — LEVOTHYROXINE SODIUM 0.05 MG/1
50 TABLET ORAL DAILY
Status: DISCONTINUED | OUTPATIENT
Start: 2023-02-01 | End: 2023-02-02 | Stop reason: HOSPADM

## 2023-01-31 RX ORDER — INSULIN GLARGINE 100 [IU]/ML
50 INJECTION, SOLUTION SUBCUTANEOUS 2 TIMES DAILY
Status: DISCONTINUED | OUTPATIENT
Start: 2023-01-31 | End: 2023-02-02 | Stop reason: HOSPADM

## 2023-01-31 RX ORDER — LISINOPRIL 10 MG/1
10 TABLET ORAL DAILY
Status: DISCONTINUED | OUTPATIENT
Start: 2023-02-01 | End: 2023-02-02

## 2023-01-31 RX ORDER — HEPARIN SODIUM 5000 [USP'U]/ML
5000 INJECTION, SOLUTION INTRAVENOUS; SUBCUTANEOUS EVERY 8 HOURS SCHEDULED
Status: DISCONTINUED | OUTPATIENT
Start: 2023-01-31 | End: 2023-02-02 | Stop reason: HOSPADM

## 2023-01-31 RX ORDER — NITROGLYCERIN 0.4 MG/1
0.4 TABLET SUBLINGUAL EVERY 5 MIN PRN
Status: DISCONTINUED | OUTPATIENT
Start: 2023-01-31 | End: 2023-02-02 | Stop reason: HOSPADM

## 2023-01-31 RX ORDER — ZINC SULFATE 50(220)MG
50 CAPSULE ORAL DAILY
Status: DISCONTINUED | OUTPATIENT
Start: 2023-02-01 | End: 2023-02-02 | Stop reason: HOSPADM

## 2023-01-31 RX ORDER — ASPIRIN 81 MG/1
324 TABLET, CHEWABLE ORAL ONCE
Status: COMPLETED | OUTPATIENT
Start: 2023-01-31 | End: 2023-01-31

## 2023-01-31 RX ORDER — MORPHINE SULFATE 4 MG/ML
4 INJECTION, SOLUTION INTRAMUSCULAR; INTRAVENOUS EVERY 4 HOURS PRN
Status: DISCONTINUED | OUTPATIENT
Start: 2023-01-31 | End: 2023-02-02 | Stop reason: HOSPADM

## 2023-01-31 RX ADMIN — MORPHINE SULFATE 4 MG: 4 INJECTION, SOLUTION INTRAMUSCULAR; INTRAVENOUS at 20:24

## 2023-01-31 RX ADMIN — ASPIRIN 324 MG: 81 TABLET, CHEWABLE ORAL at 19:02

## 2023-01-31 RX ADMIN — ACETAMINOPHEN 650 MG: 325 TABLET ORAL at 19:03

## 2023-01-31 ASSESSMENT — PAIN SCALES - GENERAL
PAINLEVEL_OUTOF10: 10

## 2023-01-31 ASSESSMENT — PAIN - FUNCTIONAL ASSESSMENT
PAIN_FUNCTIONAL_ASSESSMENT: 0-10

## 2023-01-31 ASSESSMENT — PAIN DESCRIPTION - LOCATION: LOCATION: CHEST

## 2023-01-31 ASSESSMENT — PAIN DESCRIPTION - ORIENTATION: ORIENTATION: LEFT

## 2023-01-31 NOTE — ED TRIAGE NOTES
Pt presents to the ER with c/o CP that started 2 days ago. Pt states it is mostly on the left side and occasionally it will go down his arm. Pt had a stent placed last November. Pt denies meds pta, rates his pain 10/10. Pt is alert and oriented, respirations even and unlabored. EKG completed.

## 2023-01-31 NOTE — ED PROVIDER NOTES
[facility]        Pt Name: Elaine Polanco  MRN: [id number]  [place] 1970  Date of evaluation: 1/31/2023  Treating Resident Physician: Russ Tate MD  Supervising Physician: Dr. Juaquin Velasco MD    [address] [place] [address]       Chief Complaint   Patient presents with    Chest Pain           HISTORY OF PRESENT ILLNESS & REVIEW OF SYSTEMS   HPI    History obtained from patient. Elaine Polanco is a 52 y.o. male who presents to the emergency department for evaluation of chest pain. Patient states he has had chest pain for 3 days. States it has been constant since it started. States it started at rest.  States it feels like a pressure in the middle of his chest.  States it feels similar to previous heart attack. States he had a stent placed a while ago and is on Brilinta. States he is compliant with Brilinta. Denies any shortness of breath. Denies any headache fever chills. Denies any cough abdominal pain nausea vomiting diarrhea constipation leg swelling. Denies any previous CVA or blood clot. Denies any recent long distance travel or recent procedures or surgery. Denies any hemoptysis. Denies any history of malignancy. Denies any hormone or birth control use. Denies taking anything prior to arrival.        Patient denies any new Headache, Fever, Chills, Cough, Shortness of breath, Abdominal pain, Nausea, Vomiting, Diarrhea, Constipation, and Leg swelling. The patient has no other acute complaints at this time. Review of systems as above.           PAST MEDICAL AND SURGICAL HISTORY     Past Medical History:   Diagnosis Date    Atherosclerotic heart disease of native coronary artery with unspecified angina pectoris 1/18/2022    Bipolar 2 disorder (HCC)     previously followed with Dr. Kenia Hernandez and Mikhail Fernandes in [facility]    BPH (benign prostatic hyperplasia)     COPD [facility]chronic obstructive pulmonary disease) [facility]     Diabetes mellitus type 2, uncontrolled     HgbA1c on 4/2/2019 was 9.1.     Diabetic peripheral neuropathy (HCC)     Diabetic polyneuropathy ([facility]     Diabetic ulcer of right foot associated with type 2 diabetes mellitus ([address] 12/10/2015    Essential hypertension     \"never been on b/p medication that I know of\"    GERD (gastroesophageal reflux disease)     Hammer toe of left foot     Heart murmur     denies any chest pain or palpitations    History of tobacco abuse     Hx of AKA (above knee amputation), right ([address] 04/18/2019    Dr. Olivera Vee    Hyperlipidemia     Macular edema, diabetic, bilateral ([address] 05/04/2018    Dr. John Macias referred to retina specialist for 2nd opinion    Marijuana abuse in remission     Melena     MRSA (methicillin resistant staph aureus) culture positive     Onychomycosis     Other disorders of kidney and ureter in diseases classified elsewhere     Sleep apnea     no cpap    Thyroid disease     WD-Skin ulcer of fourth toe of right foot with necrosis of bone ([address] 6/29/2016     Past Surgical History:   Procedure Laterality Date    ABDOMEN SURGERY      ABSCESS DRAINAGE Right     foot    AMPUTATION ABOVE KNEE Right 4/18/2019    RIGHT ABOVE KNEE AMPUTATION performed by Aaron Fletcher MD at [facility] LAPAROSCOPIC N/A 4/1/2020    ROBOTIC CHOLECYSTECTOMY performed by Gaudencio Jolley MD at [facility] N/A 7/20/2020    CYSTOSCOPY performed by Lee Ann Ma MD at [address] [place] [address] 8/21/2020    CYSTOSCOPY, UROLIFT performed by Lee Ann Ma MD at [address] [place] [address], ESOPHAGUS      [facility]ON[facility] Right 07/01/2016    I & D    GASTROCNEMIUS RECESSION Left 11/12/2021    LEFT LEG GASTROCS RECESSION performed by Janis Clark MD at [facility] Right 2/18/2019    I&D RIGHT STUMP performed by Aaron Fletcher MD at [facility] Right 07/20/2016    LEG AMPUTATION BELOW KNEE Right 4/4/2019    I&D AND REVISION OF AMPUTATION RIGHT LEG performed by Kimberlee Cruz MD at [place] [address] Right 1/14/15    sole of foot I&D    KS I&D SHOULDER INFECTED BURSA Left 8/18/2017    LEFT SHOULDER INCISION AND DRAINAGE performed by Kimberlee Cruz MD at [facility] OFFICE/OUTPT [facility] Right 9/20/2018    EXCISIONAL DEBRIDEMENT RIGHT BKA STUMP performed by Jackie Mittal MD at [address] Right 1/16/15    2nd toe with wound vac applied    UPPER GASTROINTESTINAL ENDOSCOPY N/A 3/3/2020    EGD DILATION SAVORY performed by Barbara Miranda MD at [facility] [address] N/A 6/15/2022    EGD DILATION BALLOON performed by Len Ortega MD at [address] [place] [address]  ? when     Patient Active Problem List   Diagnosis Code    Status post below knee amputation of right lower extremity ([facility] Z89.511    Gait disturbance R26.9    Sebaceous cyst L72.3    Uncontrolled type 2 diabetes mellitus with hyperglycemia, with long-term current use of insulin ([facility]) E11.65, Z79.4    Severe bipolar II disorder, recent episode major depressive, remission ([facility]) F31.81    Bipolar 1 disorder ([facility]) F31.9    Leukocytosis D72.829    Lactic acidosis E87.20    Hyponatremia E87.1    Normocytic anemia D64.9    Obesity (BMI 30-39. 9) E66.9    History of noncompliance with medical treatment Z91.199    Essential hypertension I10    Tobacco dependence F17.200    Gastroesophageal reflux disease without esophagitis K21.9    History of marijuana use F12.91    Physical debility R53.81    Anemia D64.9    Electrolyte imbalance E87.8    Type 2 diabetes mellitus with hyperglycemia, with long-term current use of insulin ([facility]) E11.65, Z79.4    Weakness R53.1    Infection of above knee amputation stump of right leg ([facility]) T87.43    Above knee amputation of right lower extremity ([facility] S91.710U    Vitamin D deficiency E55.9    COPD exacerbation ([facility]) J44.1    Influenza B J10.1    Depression, recurrent (HCC) F33.9    Major depressive disorder, recurrent ([facility]) F33.9    GI bleed K92.2    Elevated lipase R74.8    Other psychoactive substance abuse, uncomplicated ([facility]) U44.15    Calculus of gallbladder without cholecystitis without obstruction K80.20    Right upper quadrant abdominal pain R10.11    Pedal edema R60.0    MURALI (obstructive sleep apnea) G47.33    Shortness of breath R06.02    Adult hypothyroidism E03.9    Anxiety and depression F41.9, F32. A    Dyspnea R06.00    Sinus tachycardia Y05.4    Metabolic acidosis A45.36    Edema of left lower extremity R60.0    SOB (shortness of breath) on exertion R06.02    Intermittent palpitations R00.2    History of chest pain Z87.898    Dizziness, nonspecific R42    Hyperlipidemia E78.5    Neuropathy G62.9    NSTEMI (non-ST elevated myocardial infarction) ([facility]) I21.4    Venous stasis ulcer of left lower leg with edema of left lower leg ([facility]) I83.029, I83.892, L97.929, R60.9    Angina pectoris, unspecified I20.9    Atherosclerotic heart disease of native coronary artery with unspecified angina pectoris I25.119    Chronic diarrhea K52.9    Lower abdominal pain R10.30    Chest pain on exertion R07.9    Above knee amputation of left lower extremity ([facility] P44.436Z    COVID-19 virus infection U07.1    Chest pain R07.9         MEDICATIONS     Current Facility-Administered Medications:     nitroGLYCERIN (NITROSTAT) SL tablet 0.4 mg, 0.4 mg, SubLINGual, Q5 Min PRN, Russ Tate MD  Current Discharge Medication List        CONTINUE these medications which have NOT CHANGED    Details   Insulin Glargine, 2 Unit Dial, (TOUJEO MAX SOLOSTAR) 300 UNIT/ML SOPN INJECT 85 UNITS IN THE MORNING, 80 UNITS IN THE EVENING. EVERY 10 DAYS INCREASE BY 5 UNITS BOTH TIMES, TO GET MORNING GLUCOSE 150-200.   Qty: 42 mL, Refills: 2      gabapentin (NEURONTIN) 600 MG tablet       lisinopril (PRINIVIL;ZESTRIL) 10 MG tablet Take 1 tablet by mouth daily  Qty: 90 tablet, Refills: 1    Associated Diagnoses: Essential hypertension      levothyroxine (SYNTHROID) 50 MCG tablet TAKE 1 TABLET BY MOUTH ONCE DAILY. Qty: 90 tablet, Refills: 1    Associated Diagnoses: Hypothyroidism, unspecified type      Dulaglutide (TRULICITY) 4.5 DD/3.2RX SOPN INJECT 4.5 MG INTO THE SKIN ONCE A WEEK . Qty: 2 mL, Refills: 5      empagliflozin (JARDIANCE) 25 MG tablet TAKE 1 TABLET BY MOUTH ONCE DAILY. Qty: 30 tablet, Refills: 5      furosemide (LASIX) 20 MG tablet TAKE 1 TABLET BY MOUTH ONCE DAILY  THEN TAKE AS NEEDED FOR WEIGHT GAIN  Qty: 45 tablet, Refills: 5      atorvastatin (LIPITOR) 80 MG tablet Take 1 tablet by mouth at bedtime  Qty: 30 tablet, Refills: 5      metoprolol tartrate (LOPRESSOR) 50 MG tablet Take 1 tablet by mouth 2 times daily  Qty: 180 tablet, Refills: 1      ticagrelor (BRILINTA) 90 MG TABS tablet Take 1 tablet by mouth 2 times daily  Qty: 60 tablet, Refills: 5      ascorbic acid (VITAMIN C) 500 MG tablet Take 1 tablet by mouth daily  Qty: 30 tablet, Refills: 3      aspirin 81 MG chewable tablet Take 1 tablet by mouth daily  Qty: 30 tablet, Refills: 3      zinc sulfate (ZINCATE) 220 (50 Zn) MG capsule Take 1 capsule by mouth daily  Qty: 30 capsule, Refills: 3      Cholecalciferol (VITAMIN D) 25 MCG TABS Take 1 tablet by mouth daily  Qty: 60 tablet, Refills: 0    Comments: Labeling may look different. 25 mcg=1000 Units. Please double check dosages.       insulin lispro (HUMALOG KWIKPEN U-200) 200 UNIT/ML SOPN pen 40 units Plus scale before meals; only takes if BG >200 - Max dose 160 units per day  Qty: 30 Adjustable Dose Pre-filled Pen Syringe, Refills: 3      Multiple Vitamins-Minerals (EYE VITAMINS PO) Take 1 tablet by mouth daily      Omega-3 Fatty Acids (FISH OIL PO) Take 1 caplet by mouth daily      Probiotic Product (PROBIOTIC PO) Take 1 tablet by mouth daily      Continuous Blood Gluc Sensor (DEXCOM G6 SENSOR) MISC 1 Device by Does not apply route every 14 days  Qty: 9 each, Refills: 3    Associated Diagnoses: Uncontrolled type 2 diabetes mellitus with hyperglycemia, with long-term current use of insulin (HCC)      Continuous Blood Gluc Transmit (DEXCOM G6 TRANSMITTER) MISC 1 Device by Does not apply route every 3 months  Qty: 1 each, Refills: 3    Associated Diagnoses: Uncontrolled type 2 diabetes mellitus with hyperglycemia, with long-term current use of insulin ([facility] [place] [facility])      nitroGLYCERIN (NITROSTAT) 0.3 MG SL tablet Place 1 tablet under the tongue every 5 minutes as needed for Chest pain up to max of 3 total doses. If no relief after 1 dose, call 911. Qty: 30 tablet, Refills: 3      Insulin Pen Needle (TRUEPLUS PEN NEEDLES) 31G X 8 MM MISC USE AS DIRECTED  Qty: 200 each, Refills: 1      sertraline (ZOLOFT) 100 MG tablet Take 100 mg by mouth daily       Continuous Blood Gluc  (DEXCOM G6 ) LIANNE 1 Device by Does not apply route 4 times daily (before meals and nightly)  Qty: 1 Device, Refills: 0    Associated Diagnoses: Uncontrolled type 2 diabetes mellitus with hyperglycemia, with long-term current use of insulin ([facility])      ARIPiprazole (ABILIFY) 5 MG tablet Take 5 mg by mouth daily                SOCIAL HISTORY     Social History     Social History Narrative    Not on file     Social History     Tobacco Use    Smoking status: Former     Packs/day: 0.00     Years: 10.00     Pack years: 0.00     Types: Cigars, Cigarettes     Start date: 1985     Quit date:      Years since quittin.4    Smokeless tobacco: Never   Vaping Use    Vaping Use: Former    Substances: Nicotine, Flavoring   Substance Use Topics    Alcohol use: Not Currently     Alcohol/week: 0.0 standard drinks    Drug use: No     Comment: 30 YEARS AGO         ALLERGIES     Allergies   Allergen Reactions    Pcn [Penicillins] Shortness Of Breath, Nausea And Vomiting and Other (See Comments)     Does not remember reactions. Has TOLERATED amoxicillin and several different cephalosporins.      Actos [Pioglitazone] Swelling Clindamycin/Lincomycin Itching    Vancomycin Hives      Rash, with erythematous plaques to abdomen, shoulders, and proximal upper extremities with pruritis, persisted with 2nd dose administered slower. Metformin And Related Swelling         FAMILY HISTORY     Family History   Problem Relation Age of Onset    Diabetes Mother     Other Mother         pneumonia, H1N1    Depression Mother     Early Death Mother     High Blood Pressure Mother     High Cholesterol Mother     Vision Loss Maternal Grandmother     Arthritis Maternal Grandfather     Heart Disease Maternal Grandfather          PREVIOUS RECORDS   Previous records reviewed:  Patient was seen 12/19/2022 for internal medicine office visit regarding type 2 diabetes . PHYSICAL EXAM     ED Triage Vitals   BP Temp Temp src Pulse Resp SpO2 Height Weight   -- -- -- -- -- -- -- --     Initial vital signs and nursing assessment reviewed and vitals are/show: Afebrile, Hypertensive, Borderline tachycardic, and Normal RR. Pulsoximetry is normal per my interpretation. Additional Vital Signs:  Vitals:    01/31/23 2026   BP: 130/61   Pulse: (!) 101   Resp: 19   Temp:    SpO2: 98%       Physical Exam  Constitutional:       General: He is not in acute distress. Appearance: Normal appearance. He is obese. He is not ill-appearing, toxic-appearing or diaphoretic. HENT:      Head: Normocephalic and atraumatic. Right Ear: External ear normal.      Left Ear: External ear normal.   Eyes:      General: No scleral icterus. Right eye: No discharge. Left eye: No discharge. Cardiovascular:      Rate and Rhythm: Regular rhythm. Tachycardia present. Pulmonary:      Effort: Pulmonary effort is normal. No respiratory distress. Breath sounds: Normal breath sounds. No stridor. No wheezing, rhonchi or rales. Chest:      Chest wall: No tenderness. Abdominal:      General: Abdomen is flat. There is no distension.       Palpations: Abdomen is soft.      Tenderness: There is no abdominal tenderness. There is no guarding or rebound. Musculoskeletal:      Cervical back: Neck supple. Left lower leg: No edema. Comments: Right AKA. Skin:     General: Skin is warm and dry. Neurological:      Mental Status: He is alert and oriented to person, place, and time. Mental status is at baseline. Psychiatric:         Mood and Affect: Mood normal.         Behavior: Behavior normal.         Thought Content: Thought content normal.         Judgment: Judgment normal.           MEDICAL DECISION MAKING/ED COURSE   Initial Assessment:     52 y.o. male with a past medical history of diabetes, bipolar, hypertension, COPD, MURALI, NSTEMI on Brilinta presenting to the ED for chest pain    Differential diagnoses include but not limited to:  ACS ACS, arrhythmia, aortic dissection, cardiac tamponade, pneumothorax, PE, esophageal rupture, pneumonia, CHF, COPD, myocarditis, pericarditis, electrolyte abnormality, valvular heart disease, GERD, musculoskeletal    Plan:       EKG  Labs  Imaging  Aspirin  Nitro  Tylenol  Well's PE: low risk  HEART Score: 5  Morphine  Admission        Brief Summary of Patient Presentation:    Patient is a 52 y.o. male with a past medical history of iabetes, bipolar, hypertension, COPD, MURALI, NSTEMI on Brilinta  who was seen and evaluated in the emergency department for chest pain. EKG was nonspecific but showed no STEMI. Vital signs initially demonstrated some tachycardia but were otherwise stable. Low suspicion for PE at this point given only having tachycardia, more likely secondary to pain in nature. Low risk wells. Labs were unremarkable. Imaging was unremarkable. We gave him aspirin and he declined nitro. He was still having significant pain so given morphine. His heart score is a 5. Given his heart score 5 we discussed with the admitting team who agreed with the plan for admission. Patient admitted.                       The patient was seen and examined. Appropriate diagnostic testing was performed and results reviewed with the patient. My independent review and interpretation of data, imaging, labs and diagnostic evaluations are as above and in the ED Course. Previous patient encounter documents & history available on EMR was reviewed  Case discussed with consulting clinician:  admitting service  Shared Decision-Making was performed and disposition discussed with the Patient/Family and questions answered. MDM  Number of Diagnoses or Management Options  Chest pain, unspecified type: new, needed workup     Amount and/or Complexity of Data Reviewed  Clinical lab tests: reviewed and ordered  Tests in the radiology section of CPT®: ordered and reviewed  Tests in the medicine section of CPT®: reviewed and ordered  Decide to obtain previous medical records or to obtain history from someone other than the patient: yes    /  ED Course as of 01/31/23 2150 Tue Jan 31, 2023 1854 EKG shows no new signs of acute ischemia;  [CR]   1926 CXR:IMPRESSION:  There is no acute intrathoracic process. [CR]   1926 CBC shows WBC 11.0  BMP shows sodium 131 otherwise Unremarkable. Troponin BNP within normal limits. [CR]   1926 Patient declined nitro per RN [CR]   1927 Patient states his pain continues. Discussed additional analgesia. Morphine ordered. Heart Rate 99 in the room. Discussed potential admission for chest pain, he reluctantly agreed. [CR]   1927 Paged Dr Christiano Ramos for admission [CR]      ED Course User Index  [CR] Logan Ayon MD       ED COURSE:  ED Course as of 01/31/23 2150 Tue Jan 31, 2023 1854 EKG shows no new signs of acute ischemia;  [CR]   1926 CXR:IMPRESSION:  There is no acute intrathoracic process. [CR]   1926 CBC shows WBC 11.0  BMP shows sodium 131 otherwise Unremarkable. Troponin BNP within normal limits. [CR]   1926 Patient declined nitro per RN [CR]   1927 Patient states his pain continues. Discussed additional analgesia. Morphine ordered. Heart Rate 99 in the room. Discussed potential admission for chest pain, he reluctantly agreed. [CR]   1927 Paged Dr Kashmir Warner for admission [CR]      ED Course User Index  [CR] Rasheeda Zambrano MD                 ED Medications administered this visit:  (None if blank)  Medications   nitroGLYCERIN (NITROSTAT) SL tablet 0.4 mg (0.4 mg SubLINGual Not Given 1/31/23 1904)   aspirin chewable tablet 324 mg (324 mg Oral Given 1/31/23 1902)   acetaminophen (TYLENOL) tablet 650 mg (650 mg Oral Given 1/31/23 1903)   morphine injection 4 mg (4 mg IntraVENous Given 1/31/23 2024)         PROCEDURES: (None if blank)  Procedures:     CRITICAL CARE: (None if blank)      DISCHARGE PRESCRIPTIONS: (None if blank)  Current Discharge Medication List          FORMAL DIAGNOSTIC RESULTS     RADIOLOGY: Interpretation per the Radiologist below, if available at the time of this note (none if blank):    XR CHEST PORTABLE   Final Result   Impression:   1. No acute cardiopulmonary disease seen. This document has been electronically signed by: Ingrid May MD on    01/31/2023 07:30 PM          LABS: (none if blank)  Labs Reviewed   BASIC METABOLIC PANEL - Abnormal; Notable for the following components:       Result Value    Sodium 131 (*)     Chloride 94 (*)     Glucose 267 (*)     All other components within normal limits   CBC WITH AUTO DIFFERENTIAL - Abnormal; Notable for the following components:    WBC 11.0 (*)     RDW-SD 45.1 (*)     All other components within normal limits   OSMOLALITY - Abnormal; Notable for the following components:    Osmolality Calc 272.5 (*)     All other components within normal limits   TROPONIN   BRAIN NATRIURETIC PEPTIDE   ANION GAP   GLOMERULAR FILTRATION RATE, ESTIMATED                MEDICATION CHANGES     Current Discharge Medication List          FINAL IMPRESSION      1.  Chest pain, unspecified type          FINAL DISPOSITION     Final diagnoses:   Chest pain, unspecified type     Condition: condition: stable  Dispo: Admit to med/surg floor      This transcription was electronically signed. Parts of this transcriptions may have been dictated by use of voice recognition software and electronically transcribed, and parts may have been transcribed with the assistance of an ED scribe. The transcription may contain errors not detected in proofreading. Please refer to my supervising physician's documentation if my documentation differs.     Electronically Signed: Melissa Christopher MD, 01/31/23, 9:50 PM          Melissa Christopher MD  Resident  01/31/23 7830

## 2023-02-01 ENCOUNTER — PREP FOR PROCEDURE (OUTPATIENT)
Dept: CARDIOLOGY | Age: 53
End: 2023-02-01

## 2023-02-01 LAB
ANION GAP SERPL CALC-SCNC: 13 MEQ/L (ref 8–16)
BASOPHILS ABSOLUTE: 0.1 THOU/MM3 (ref 0–0.1)
BASOPHILS NFR BLD AUTO: 0.8 %
BUN SERPL-MCNC: 15 MG/DL (ref 7–22)
CALCIUM SERPL-MCNC: 8.8 MG/DL (ref 8.5–10.5)
CHLORIDE SERPL-SCNC: 100 MEQ/L (ref 98–111)
CO2 SERPL-SCNC: 22 MEQ/L (ref 23–33)
CREAT SERPL-MCNC: 0.7 MG/DL (ref 0.4–1.2)
DEPRECATED RDW RBC AUTO: 46.3 FL (ref 35–45)
EKG ATRIAL RATE: 86 BPM
EKG P AXIS: 15 DEGREES
EKG P-R INTERVAL: 268 MS
EKG Q-T INTERVAL: 368 MS
EKG QRS DURATION: 80 MS
EKG QTC CALCULATION (BAZETT): 440 MS
EKG R AXIS: 0 DEGREES
EKG T AXIS: 35 DEGREES
EKG VENTRICULAR RATE: 86 BPM
EOSINOPHIL NFR BLD AUTO: 5.5 %
EOSINOPHILS ABSOLUTE: 0.6 THOU/MM3 (ref 0–0.4)
ERYTHROCYTE [DISTWIDTH] IN BLOOD BY AUTOMATED COUNT: 14.1 % (ref 11.5–14.5)
GFR SERPL CREATININE-BSD FRML MDRD: > 60 ML/MIN/1.73M2
GLUCOSE BLD STRIP.AUTO-MCNC: 119 MG/DL (ref 70–108)
GLUCOSE BLD STRIP.AUTO-MCNC: 151 MG/DL (ref 70–108)
GLUCOSE BLD STRIP.AUTO-MCNC: 188 MG/DL (ref 70–108)
GLUCOSE BLD STRIP.AUTO-MCNC: 190 MG/DL (ref 70–108)
GLUCOSE BLD STRIP.AUTO-MCNC: 201 MG/DL (ref 70–108)
GLUCOSE SERPL-MCNC: 209 MG/DL (ref 70–108)
HCT VFR BLD AUTO: 43.7 % (ref 42–52)
HGB BLD-MCNC: 15.2 GM/DL (ref 14–18)
IMM GRANULOCYTES # BLD AUTO: 0.06 THOU/MM3 (ref 0–0.07)
IMM GRANULOCYTES NFR BLD AUTO: 0.6 %
LV EF: 55 %
LVEF MODALITY: NORMAL
LYMPHOCYTES ABSOLUTE: 3.9 THOU/MM3 (ref 1–4.8)
LYMPHOCYTES NFR BLD AUTO: 36.2 %
MCH RBC QN AUTO: 31.4 PG (ref 26–33)
MCHC RBC AUTO-ENTMCNC: 34.8 GM/DL (ref 32.2–35.5)
MCV RBC AUTO: 90.3 FL (ref 80–94)
MONOCYTES ABSOLUTE: 0.9 THOU/MM3 (ref 0.4–1.3)
MONOCYTES NFR BLD AUTO: 8.4 %
NEUTROPHILS NFR BLD AUTO: 48.5 %
NRBC BLD AUTO-RTO: 0 /100 WBC
PLATELET # BLD AUTO: 218 THOU/MM3 (ref 130–400)
PMV BLD AUTO: 9.3 FL (ref 9.4–12.4)
POTASSIUM SERPL-SCNC: 4.1 MEQ/L (ref 3.5–5.2)
RBC # BLD AUTO: 4.84 MILL/MM3 (ref 4.7–6.1)
SEGMENTED NEUTROPHILS ABSOLUTE COUNT: 5.3 THOU/MM3 (ref 1.8–7.7)
SODIUM SERPL-SCNC: 135 MEQ/L (ref 135–145)
TROPONIN T: < 0.01 NG/ML
WBC # BLD AUTO: 10.9 THOU/MM3 (ref 4.8–10.8)

## 2023-02-01 PROCEDURE — 2580000003 HC RX 258: Performed by: STUDENT IN AN ORGANIZED HEALTH CARE EDUCATION/TRAINING PROGRAM

## 2023-02-01 PROCEDURE — 82948 REAGENT STRIP/BLOOD GLUCOSE: CPT

## 2023-02-01 PROCEDURE — 85025 COMPLETE CBC W/AUTO DIFF WBC: CPT

## 2023-02-01 PROCEDURE — 93005 ELECTROCARDIOGRAM TRACING: CPT | Performed by: STUDENT IN AN ORGANIZED HEALTH CARE EDUCATION/TRAINING PROGRAM

## 2023-02-01 PROCEDURE — 36415 COLL VENOUS BLD VENIPUNCTURE: CPT

## 2023-02-01 PROCEDURE — 99232 SBSQ HOSP IP/OBS MODERATE 35: CPT | Performed by: INTERNAL MEDICINE

## 2023-02-01 PROCEDURE — 6360000002 HC RX W HCPCS: Performed by: STUDENT IN AN ORGANIZED HEALTH CARE EDUCATION/TRAINING PROGRAM

## 2023-02-01 PROCEDURE — 84484 ASSAY OF TROPONIN QUANT: CPT

## 2023-02-01 PROCEDURE — 2500000003 HC RX 250 WO HCPCS: Performed by: INTERNAL MEDICINE

## 2023-02-01 PROCEDURE — 80048 BASIC METABOLIC PNL TOTAL CA: CPT

## 2023-02-01 PROCEDURE — 93010 ELECTROCARDIOGRAM REPORT: CPT | Performed by: INTERNAL MEDICINE

## 2023-02-01 PROCEDURE — 99223 1ST HOSP IP/OBS HIGH 75: CPT | Performed by: INTERNAL MEDICINE

## 2023-02-01 PROCEDURE — 6370000000 HC RX 637 (ALT 250 FOR IP): Performed by: STUDENT IN AN ORGANIZED HEALTH CARE EDUCATION/TRAINING PROGRAM

## 2023-02-01 PROCEDURE — 1200000003 HC TELEMETRY R&B

## 2023-02-01 PROCEDURE — 93307 TTE W/O DOPPLER COMPLETE: CPT

## 2023-02-01 RX ORDER — SODIUM CHLORIDE 0.9 % (FLUSH) 0.9 %
5-40 SYRINGE (ML) INJECTION PRN
Status: DISCONTINUED | OUTPATIENT
Start: 2023-02-01 | End: 2023-02-02 | Stop reason: HOSPADM

## 2023-02-01 RX ORDER — INSULIN LISPRO 100 [IU]/ML
20 INJECTION, SOLUTION INTRAVENOUS; SUBCUTANEOUS
Status: DISCONTINUED | OUTPATIENT
Start: 2023-02-01 | End: 2023-02-02 | Stop reason: HOSPADM

## 2023-02-01 RX ORDER — SODIUM CHLORIDE 0.9 % (FLUSH) 0.9 %
5-40 SYRINGE (ML) INJECTION EVERY 12 HOURS SCHEDULED
Status: DISCONTINUED | OUTPATIENT
Start: 2023-02-01 | End: 2023-02-02 | Stop reason: HOSPADM

## 2023-02-01 RX ORDER — NITROGLYCERIN 20 MG/100ML
5-200 INJECTION INTRAVENOUS CONTINUOUS
Status: DISCONTINUED | OUTPATIENT
Start: 2023-02-01 | End: 2023-02-02

## 2023-02-01 RX ORDER — SODIUM CHLORIDE 9 MG/ML
INJECTION, SOLUTION INTRAVENOUS PRN
Status: DISCONTINUED | OUTPATIENT
Start: 2023-02-01 | End: 2023-02-02 | Stop reason: SDUPTHER

## 2023-02-01 RX ADMIN — METOPROLOL TARTRATE 50 MG: 50 TABLET, FILM COATED ORAL at 01:13

## 2023-02-01 RX ADMIN — SODIUM CHLORIDE, PRESERVATIVE FREE 10 ML: 5 INJECTION INTRAVENOUS at 01:04

## 2023-02-01 RX ADMIN — HEPARIN SODIUM 5000 UNITS: 5000 INJECTION INTRAVENOUS; SUBCUTANEOUS at 05:54

## 2023-02-01 RX ADMIN — GABAPENTIN 600 MG: 600 TABLET, FILM COATED ORAL at 00:47

## 2023-02-01 RX ADMIN — ONDANSETRON 4 MG: 2 INJECTION INTRAMUSCULAR; INTRAVENOUS at 20:38

## 2023-02-01 RX ADMIN — MORPHINE SULFATE 4 MG: 4 INJECTION, SOLUTION INTRAMUSCULAR; INTRAVENOUS at 07:17

## 2023-02-01 RX ADMIN — SODIUM CHLORIDE, PRESERVATIVE FREE 10 ML: 5 INJECTION INTRAVENOUS at 09:10

## 2023-02-01 RX ADMIN — ATORVASTATIN CALCIUM 80 MG: 80 TABLET, FILM COATED ORAL at 04:00

## 2023-02-01 RX ADMIN — LEVOTHYROXINE SODIUM 50 MCG: 0.05 TABLET ORAL at 05:54

## 2023-02-01 RX ADMIN — NITROGLYCERIN 5 MCG/MIN: 20 INJECTION INTRAVENOUS at 11:16

## 2023-02-01 RX ADMIN — GABAPENTIN 600 MG: 600 TABLET, FILM COATED ORAL at 19:28

## 2023-02-01 RX ADMIN — TICAGRELOR 90 MG: 90 TABLET ORAL at 00:47

## 2023-02-01 RX ADMIN — ATORVASTATIN CALCIUM 80 MG: 80 TABLET, FILM COATED ORAL at 19:27

## 2023-02-01 RX ADMIN — MORPHINE SULFATE 4 MG: 4 INJECTION, SOLUTION INTRAMUSCULAR; INTRAVENOUS at 20:41

## 2023-02-01 RX ADMIN — MORPHINE SULFATE 4 MG: 4 INJECTION, SOLUTION INTRAMUSCULAR; INTRAVENOUS at 00:47

## 2023-02-01 RX ADMIN — HEPARIN SODIUM 5000 UNITS: 5000 INJECTION INTRAVENOUS; SUBCUTANEOUS at 00:47

## 2023-02-01 RX ADMIN — METOPROLOL TARTRATE 50 MG: 50 TABLET, FILM COATED ORAL at 19:27

## 2023-02-01 ASSESSMENT — PAIN SCALES - GENERAL
PAINLEVEL_OUTOF10: 4
PAINLEVEL_OUTOF10: 0
PAINLEVEL_OUTOF10: 7
PAINLEVEL_OUTOF10: 6
PAINLEVEL_OUTOF10: 4
PAINLEVEL_OUTOF10: 8
PAINLEVEL_OUTOF10: 5
PAINLEVEL_OUTOF10: 4
PAINLEVEL_OUTOF10: 6
PAINLEVEL_OUTOF10: 8
PAINLEVEL_OUTOF10: 0
PAINLEVEL_OUTOF10: 9
PAINLEVEL_OUTOF10: 6

## 2023-02-01 ASSESSMENT — PAIN DESCRIPTION - LOCATION
LOCATION: CHEST

## 2023-02-01 ASSESSMENT — PAIN DESCRIPTION - DESCRIPTORS: DESCRIPTORS: SHARP

## 2023-02-01 ASSESSMENT — PAIN DESCRIPTION - ORIENTATION
ORIENTATION: LEFT
ORIENTATION: MID
ORIENTATION: MID

## 2023-02-01 NOTE — PROGRESS NOTES
Hospitalist Progress Note      Patient:  David Malik    Unit/Bed:8B-28/028-A  YOB: 1970  MRN: [id number]   Acct: [de-identified]   PCP: Jordan Roberson MD  Date of Admission: 1/31/2023    Assessment/Plan:    Chest pain, concerning for ACS: Heart score of 5. Chest pain similar to nature to previous heart attack. Initial troponin  negative and EKG was without acute changes. Other possibility is musculoskeletal (as chest pain is reproducible) versus GERD. Received aspirin 324 mg at ED, will continue home aspirin and Brilinta. Continue home high intensity statin. trops x negative,  On telemetry. Cardiology consulted they  recommended LHC today due to risk factors . Complains of mild CP on IV NTG drip at this time, will titrate on 10 mcg now. , spoke with his RN    History of CAD status post OLEG to RCA on 11/25: Continue home aspirin, statin, metoprolol and Brilinta., tele . Admits to compliance       With meds. Leukocytosis: White count of 11, likely stress response. Will monitor. Diabetes mellitus type 2 with neuropathy: On Humalog 40 units 3 times daily and 85 units glargine in a.m. and 80 units in p.m. Have reduced short acting insulin to 20 units 3 times daily (should be held if remains n.p.o) and Lantus to 50 units twice daily as currently n.p.o. Sugars range 150 - 200 with last A1c of  6.5     Chronic HFpEF: EF of 55% on 11/25/2022. Hx of grade 1 diastolic dysfunction. No signs or symptoms of acute heart failure as chest x-ray negative and proBNP within normal limit. continue home metoprolol. Holding home lisinopril and Lasix due to cath today . History of AKA: Noted  Hypothyroidism: Continue home Synthroid  Hyperlipidemia: Continue home statin  History of bipolar 2 disorder: Continue home Abilify and sertraline.     Chief Complaint: chest pain     Initial H and P:-      David Malik is a 52 y.o. male past medical history of HFpEF, CAD, DM type II with neuropathy, AKA of right lower extremity who presents with complaint of chest pain. Patient reports having chest pain for the past 3 days. It started at rest when he was watching TV. It has been constant since then. It is located at middle of the chest and does not radiate elsewhere, but he does report intermittent left arm pain. No radiation to jaw. He describes it as pressure-like in nature. It feels similar in nature to his previous heart attack. Denies any trauma. It is nonpleuritic. He does not attribute it to food and it does not feel like GERD in nature. He is compliant with his Brilinta. Otherwise he denies any fever or chills. No shortness of breath. No nausea or vomiting. No abdominal pain. No issues with bowel movement or urination. At the ER, his initial troponin was negative and EKG was without acute changes. But since his heart score was 5, hospitalist team were contacted for admission for evaluation of chest pain. Subjective (past 24 hours):     Mild CP at this time on IV NTG, no diaphoresis, or SOB. No recent   Fever or chills, no UTI or URI symptoms. Past medical history, family history, social history and allergies reviewed again and is unchanged since admission. ROS (All review of systems completed. Pertinent positives noted.  Otherwise All other systems reviewed and negative.)     Medications:  Reviewed    Infusion Medications    nitroGLYCERIN 20 mcg/min (02/01/23 6734)    sodium chloride      dextrose       Scheduled Medications    insulin lispro  20 Units SubCUTAneous TID WC    sodium chloride flush  5-40 mL IntraVENous 2 times per day    aspirin  81 mg Oral Daily    atorvastatin  80 mg Oral Nightly    heparin (porcine)  5,000 Units SubCUTAneous 3 times per day    ARIPiprazole  5 mg Oral Daily    ascorbic acid  500 mg Oral Daily    Vitamin D  1,000 Units Oral Daily    [Held by provider] furosemide  20 mg Oral Daily    gabapentin 600 mg Oral TID    levothyroxine  50 mcg Oral Daily    [Held by provider] lisinopril  10 mg Oral Daily    metoprolol tartrate  50 mg Oral BID    multivitamin  1 tablet Oral Daily    sertraline  100 mg Oral Daily    ticagrelor  90 mg Oral BID    zinc sulfate  50 mg Oral Daily    insulin glargine  50 Units SubCUTAneous BID     PRN Meds: nitroGLYCERIN, sodium chloride flush, sodium chloride, ondansetron **OR** ondansetron, acetaminophen **OR** acetaminophen, polyethylene glycol, glucose, dextrose bolus **OR** dextrose bolus, glucagon (rDNA), dextrose, morphine **OR** morphine    No intake or output data in the 24 hours ending 02/01/23 1618    Diet:  Diet NPO due to [facility]     Physical Exam:  /76   Pulse 88   Temp 98 °F (36.7 °C) (Oral)   Resp 18   Ht 5' 6\" (1.676 m)   Wt 225 lb (102.1 kg)   SpO2 99%   BMI 36.32 kg/m²   General appearance: No apparent distress, appears stated age and cooperative. HEENT: Pupils equal, round, and reactive to light. Conjunctivae/corneas clear. Neck: Supple, with full range of motion. No jugular venous distention. Trachea midline. Respiratory:  Normal respiratory effort. Clear to auscultation, bilaterally without Rales/Wheezes/Rhonchi. Cardiovascular: Regular rate and rhythm with normal S1/S2 without murmurs, rubs or gallops. Abdomen: Soft, non-tender, non-distended with normal bowel sounds. Musculoskeletal: Right AKA, no clubbing or edema   Skin: Skin color, texture, turgor normal.  No rashes or lesions. Neurologic:  Neurovascularly intact without any focal sensory/motor deficits.  Cranial nerves: II-XII intact, grossly non-focal.  Psychiatric: Alert and oriented, thought content appropriate, normal insight  Capillary Refill: Brisk,< 3 seconds   Peripheral Pulses: +2 palpable, equal bilaterally     Labs:   Recent Labs     01/31/23 1845 02/01/23  0614   WBC 11.0* 10.9*   HGB 15.0 15.2   HCT 42.6 43.7    218     Recent Labs     01/31/23  1845 02/01/23  0614   * 135   K 3.7 4.1   CL 94* 100   CO2 26 22*   BUN 14 15   CREATININE 0.8 0.7   CALCIUM 9.2 8.8     No results for input(s): AST, ALT, BILIDIR, BILITOT, ALKPHOS in the last 72 hours. No results for input(s): INR in the last 72 hours. No results for input(s): Marylin Bigness in the last 72 hours. Microbiology:    Blood culture #1:   Lab Results   Component Value Date/Time    BC No growth-preliminary No growth 07/14/2022 04:48 PM       Blood culture #2:No results found for: Hema Stevens    Organism:  Lab Results   Component Value Date/Time    ORG Staphylococcus aureus 11/30/2020 04:40 PM         Lab Results   Component Value Date/Time    LABGRAM  01/04/2022 11:15 PM     Few segmented neutrophils observed. Rare epithelial cells observed. No organisms observed. MRSA culture only:No results found for: [facility]    Urine culture: No results found for: LABURIN    Respiratory culture: No results found for: CULTRESP    Aerobic and Anaerobic :  Lab Results   Component Value Date/Time    LABAERO  01/04/2022 11:15 PM     No growth-preliminary Culture yielded light growth of Staphylococcus species (coagulase negative) and gram positive bacilli most consistent with Bacillus species. Clinical correlation required. Lab Results   Component Value Date/Time    LABANAE  01/04/2022 11:15 PM     No anaerobes isolated- preliminary No anaerobes isolated       Urinalysis:      Lab Results   Component Value Date/Time    NITRU NEGATIVE 12/01/2022 09:39 AM    WBCUA 0-2 12/01/2022 09:39 AM    BACTERIA NONE SEEN 12/01/2022 09:39 AM    RBCUA 0-2 12/01/2022 09:39 AM    BLOODU NEGATIVE 12/01/2022 09:39 AM    SPECGRAV 1.022 12/01/2022 09:39 AM    GLUCOSEU 250 07/14/2022 10:30 PM       Radiology:  XR CHEST PORTABLE   Final Result   Impression:   1. No acute cardiopulmonary disease seen.       This document has been electronically signed by: Shruti Lynn MD on    01/31/2023 07:30 PM        XR CHEST PORTABLE    Result Date: 1/31/2023  Exam: One View of the chest Comparison: 7/14/2022 Findings: Hilar and mediastinal contours are within normal limits. Cardiac silhouette is not enlarged. No pneumothorax. No pleural fluid collection. No acute infiltrate     Impression: 1. No acute cardiopulmonary disease seen.  This document has been electronically signed by: Kristen Figueroa MD on 01/31/2023 07:30 PM      Electronically signed by John Maravilla MD on 2/1/2023 at 4:18 PM

## 2023-02-01 NOTE — H&P
Hospitalist - History & Physical      Patient: Ruel Ryan    Unit/Bed:8B-28/028-A  YOB: 1970  MRN: [id number]   Acct: [de-identified]   PCP: Ralph Campos MD    Date of Service: Pt seen/examined on 01/31/23  and Admitted to Inpatient with expected LOS greater than two midnights due to medical therapy. Chief Complaint: Chest pain    Assessment and Plan:-  Chest pain, concerning for ACS: Heart score of 5. Chest pain similar to nature to previous heart attack. Initial troponin  negative and EKG was without acute changes. Other possibility is musculoskeletal (as chest pain is reproducible) versus GERD. Received aspirin 324 mg at ED, will continue home aspirin and Brilinta. Continue home high intensity statin. Will trend troponin. On telemetry. Cardiology consult in a.m. n.p.o. after midnight in case of any intervention in a.m. patient does not want nitro for pain control, thus currently on morphine PRN  History of CAD status post OLEG to RCA on 11/25: Continue home aspirin, statin, metoprolol and Brilinta. Have held lisinopril in case undergoes cath tomorrow. Can be resumed if no such plans. Leukocytosis: White count of 11, likely stress response. Will monitor. Diabetes mellitus type 2 with neuropathy: On Humalog 40 units 3 times daily and 85 units glargine in a.m. and 80 units in p.m. Have reduced short acting insulin to 20 units 3 times daily (should be held if remains n.p.o) and Lantus to 50 units twice daily as currently n.p.o. Chronic HFpEF: EF of 55% on 11/25/2022. Hx of grade 1 diastolic dysfunction. No signs or symptoms of acute heart failure as chest x-ray negative and proBNP within normal limit. continue home metoprolol. Holding home lisinopril and Lasix due to above, should be resumed if no cath planned.   History of AKA: Noted  Hypothyroidism: Continue home Synthroid  Hyperlipidemia: Continue home statin  History of bipolar 2 disorder: Continue home Abilify and sertraline. History Of Present Illness:    Mr. Alejandro Mullins is a 52 y.o. male past medical history of HFpEF, CAD, DM type II with neuropathy, AKA of right lower extremity who presents with complaint of chest pain. Patient reports having chest pain for the past 3 days. It started at rest when he was watching TV. It has been constant since then. It is located at middle of the chest and does not radiate elsewhere, but he does report intermittent left arm pain. No radiation to jaw. He describes it as pressure-like in nature. It feels similar in nature to his previous heart attack. Denies any trauma. It is nonpleuritic. He does not attribute it to food and it does not feel like GERD in nature. He is compliant with his Brilinta. Otherwise he denies any fever or chills. No shortness of breath. No nausea or vomiting. No abdominal pain. No issues with bowel movement or urination. At the ER, his initial troponin was negative and EKG was without acute changes. But since his heart score was 5, hospitalist team were contacted for admission for evaluation of chest pain. Past Medical History:        Diagnosis Date    Atherosclerotic heart disease of native coronary artery with unspecified angina pectoris 1/18/2022    Bipolar 2 disorder [facility])     previously followed with Dr. Jerman Garcia and Jasmine Goodman in [facility]    BPH (benign prostatic hyperplasia)     COPD (chronic obstructive pulmonary disease) ([facility]     Diabetes mellitus type 2, uncontrolled     HgbA1c on 4/2/2019 was 9.1.     Diabetic peripheral neuropathy (HCC)     Diabetic polyneuropathy ([facility]     Diabetic ulcer of right foot associated with type 2 diabetes mellitus ([facility] 12/10/2015    Essential hypertension     \"never been on b/p medication that I know of\"    GERD (gastroesophageal reflux disease)     Hammer toe of left foot     Heart murmur     denies any chest pain or palpitations    History of tobacco abuse     Hx of AKA (above knee amputation), right ([facility] 04/18/2019    Dr. Anthony Perez    Hyperlipidemia     Macular edema, diabetic, bilateral ([address] 05/04/2018    Dr. Pamela Sinclair referred to retina specialist for 2nd opinion    Marijuana abuse in remission     Melena     MRSA (methicillin resistant staph aureus) culture positive     Onychomycosis     Other disorders of kidney and ureter in diseases classified elsewhere     Sleep apnea     no cpap    Thyroid disease     WD-Skin ulcer of fourth toe of right foot with necrosis of bone ([address] 6/29/2016       Past Surgical History:        Procedure Laterality Date    ABDOMEN SURGERY      ABSCESS DRAINAGE Right     foot    AMPUTATION ABOVE KNEE Right 4/18/2019    RIGHT ABOVE KNEE AMPUTATION performed by Rodolfo Ramirez MD at [facility] LAPAROSCOPIC N/A 4/1/2020    ROBOTIC CHOLECYSTECTOMY performed by Sis Whitehead MD at [address] [facility] N/A 7/20/2020    CYSTOSCOPY performed by Josef Newsome MD at [address] [place] [address] 8/21/2020    CYSTOSCOPY, UROLIFT performed by Josef Newsome MD at [facility], ESOPHAGUS      ENDOSCOPY, COLON, DIAGNOSTIC      FOOT DEBRIDEMENT Right 07/01/2016    I & D    GASTROCNEMIUS RECESSION Left 11/12/2021    LEFT LEG GASTROCS RECESSION performed by Vera Harada, MD at [address] Right 2/18/2019    I&D RIGHT STUMP performed by Rodolfo Ramirez MD at [address] [facility] Right 07/20/2016    LEG AMPUTATION BELOW KNEE Right 4/4/2019    I&D AND REVISION OF AMPUTATION RIGHT LEG performed by Rodolfo Ramirez MD at [address]  [facility] Right 1/14/15    sole of foot I&D    MI I&D SHOULDER INFECTED BURSA Left 8/18/2017    LEFT SHOULDER INCISION AND DRAINAGE performed by Rodolfo Ramirez MD at [address] OFFICE/OUTPT [facility] Right 9/20/2018    EXCISIONAL DEBRIDEMENT RIGHT BKA STUMP performed by Chris Busch MD at [address] [facility] AMPUTATION Right 1/16/15    2nd toe with wound vac applied    UPPER GASTROINTESTINAL ENDOSCOPY N/A 3/3/2020    EGD DILATION SAVORY performed by Laymond Heimlich, MD at [facility] 6/15/2022    EGD DILATION BALLOON performed by Danielle Murray MD at [address] [place] [address]  ? when       Home Medications:   No current facility-administered medications on file prior to encounter. Current Outpatient Medications on File Prior to Encounter   Medication Sig Dispense Refill    Insulin Glargine, 2 Unit Dial, (TOUJEO MAX SOLOSTAR) 300 UNIT/ML SOPN INJECT 85 UNITS IN THE MORNING, 80 UNITS IN THE EVENING. EVERY 10 DAYS INCREASE BY 5 UNITS BOTH TIMES, TO GET MORNING GLUCOSE 150-200. 42 mL 2    gabapentin (NEURONTIN) 600 MG tablet       lisinopril (PRINIVIL;ZESTRIL) 10 MG tablet Take 1 tablet by mouth daily 90 tablet 1    levothyroxine (SYNTHROID) 50 MCG tablet TAKE 1 TABLET BY MOUTH ONCE DAILY. 90 tablet 1    Dulaglutide (TRULICITY) 4.5 BI/4.4YU SOPN INJECT 4.5 MG INTO THE SKIN ONCE A WEEK . 2 mL 5    empagliflozin (JARDIANCE) 25 MG tablet TAKE 1 TABLET BY MOUTH ONCE DAILY.  30 tablet 5    furosemide (LASIX) 20 MG tablet TAKE 1 TABLET BY MOUTH ONCE DAILY  THEN TAKE AS NEEDED FOR WEIGHT GAIN 45 tablet 5    atorvastatin (LIPITOR) 80 MG tablet Take 1 tablet by mouth at bedtime 30 tablet 5    metoprolol tartrate (LOPRESSOR) 50 MG tablet Take 1 tablet by mouth 2 times daily 180 tablet 1    ticagrelor (BRILINTA) 90 MG TABS tablet Take 1 tablet by mouth 2 times daily 60 tablet 5    ascorbic acid (VITAMIN C) 500 MG tablet Take 1 tablet by mouth daily 30 tablet 3    aspirin 81 MG chewable tablet Take 1 tablet by mouth daily 30 tablet 3    zinc sulfate (ZINCATE) 220 (50 Zn) MG capsule Take 1 capsule by mouth daily 30 capsule 3    Cholecalciferol (VITAMIN D) 25 MCG TABS Take 1 tablet by mouth daily 60 tablet 0    insulin lispro (HUMALOG KWIKPEN U-200) 200 UNIT/ML SOPN pen 40 units Plus scale before meals; only takes if BG >200 - Max dose 160 units per day (Patient taking differently: 40 units Plus scale before meals; only takes if BG >200 - Max dose 160 units per day     States not doing sliding scale) 30 Adjustable Dose Pre-filled Pen Syringe 3    Multiple Vitamins-Minerals (EYE VITAMINS PO) Take 1 tablet by mouth daily      Omega-3 Fatty Acids (FISH OIL PO) Take 1 caplet by mouth daily      Probiotic Product (PROBIOTIC PO) Take 1 tablet by mouth daily      Continuous Blood Gluc Sensor (DEXCOM G6 SENSOR) MISC 1 Device by Does not apply route every 14 days 9 each 3    Continuous Blood Gluc Transmit (DEXCOM G6 TRANSMITTER) MISC 1 Device by Does not apply route every 3 months 1 each 3    nitroGLYCERIN (NITROSTAT) 0.3 MG SL tablet Place 1 tablet under the tongue every 5 minutes as needed for Chest pain up to max of 3 total doses. If no relief after 1 dose, call 911. 30 tablet 3    Insulin Pen Needle (TRUEPLUS PEN NEEDLES) 31G X 8 MM MISC USE AS DIRECTED 200 each 1    sertraline (ZOLOFT) 100 MG tablet Take 100 mg by mouth daily       Continuous Blood Gluc  (DEXCOM G6 ) LIANNE 1 Device by Does not apply route 4 times daily (before meals and nightly) 1 Device 0    ARIPiprazole (ABILIFY) 5 MG tablet Take 5 mg by mouth daily          Allergies:    Pcn [penicillins], Actos [pioglitazone], Clindamycin/lincomycin, Vancomycin, and Metformin and related    Social History:    reports that he quit smoking about 22 years ago. His smoking use included cigars and cigarettes. He started smoking about 38 years ago. He has never used smokeless tobacco. He reports that he does not currently use alcohol. He reports that he does not use drugs.     Family History:       Problem Relation Age of Onset    Diabetes Mother     Other Mother         pneumonia, H1N1    Depression Mother     Early Death Mother     High Blood Pressure Mother     High Cholesterol Mother     Vision Loss Maternal Grandmother Arthritis Maternal Grandfather     Heart Disease Maternal Grandfather        Diet:  Diet NPO  ADULT DIET; Regular; 4 carb choices (60 gm/meal); No Caffeine    Review of systems:   Pertinent positives as noted in the HPI. All other systems reviewed and negative. PHYSICAL EXAM:  BP (!) 141/79   Pulse 93   Temp 98.1 °F (36.7 °C) (Oral)   Resp 18   Ht 5' 6\" (1.676 m)   Wt 225 lb (102.1 kg)   SpO2 97%   BMI 36.32 kg/m²   General appearance: No apparent distress, appears stated age and cooperative. HEENT: Normal cephalic, atraumatic without obvious deformity. Pupils equal, round, and reactive to light. Extra ocular muscles intact. Conjunctivae/corneas clear. Neck: Supple, with full range of motion. No jugular venous distention. Trachea midline. Respiratory:  Normal respiratory effort. Clear to auscultation, bilaterally without Rales/Wheezes/Rhonchi. Cardiovascular: Regular rate and rhythm with normal S1/S2 without murmurs, rubs or gallops. chest wall tenderness present at middle of chest  Abdomen: Soft, non-tender, non-distended with normal bowel sounds. Musculoskeletal:  No clubbing, cyanosis or edema bilaterally. Right AKA. Skin: Skin color, texture, turgor normal.  No rashes or lesions. Neurologic:  Neurovascularly intact without any focal sensory/motor deficits. Cranial nerves: II-XII intact, grossly non-focal.  Psychiatric: Alert and oriented, thought content appropriate, normal insight    Labs:   Recent Labs     01/31/23  1845   WBC 11.0*   HGB 15.0   HCT 42.6        Recent Labs     01/31/23  1845   *   K 3.7   CL 94*   CO2 26   BUN 14   CREATININE 0.8   CALCIUM 9.2     No results for input(s): AST, ALT, BILIDIR, BILITOT, ALKPHOS in the last 72 hours. No results for input(s): INR in the last 72 hours. No results for input(s): Randene Holes in the last 72 hours.     Urinalysis:    Lab Results   Component Value Date/Time    NITRU NEGATIVE 12/01/2022 09:39 AM    WBCUA 0-2 12/01/2022 09:39 AM    BACTERIA NONE SEEN 12/01/2022 09:39 AM    RBCUA 0-2 12/01/2022 09:39 AM    BLOODU NEGATIVE 12/01/2022 09:39 AM    SPECGRAV 1.022 12/01/2022 09:39 AM    GLUCOSEU 250 07/14/2022 10:30 PM       Radiology:   XR CHEST PORTABLE   Final Result   Impression:   1. No acute cardiopulmonary disease seen. This document has been electronically signed by: Francisca Ortega MD on    01/31/2023 07:30 PM        XR CHEST PORTABLE    Result Date: 1/31/2023  Exam: One View of the chest Comparison: 7/14/2022 Findings: Hilar and mediastinal contours are within normal limits. Cardiac silhouette is not enlarged. No pneumothorax. No pleural fluid collection. No acute infiltrate     Impression: 1. No acute cardiopulmonary disease seen.  This document has been electronically signed by: Francisca Ortega MD on 01/31/2023 07:30 PM        EKG: no change since previous ECG dated 1/31/2023    Electronically signed by Dandy Jo MD on 1/31/2023 at 10:20 PM

## 2023-02-01 NOTE — ED NOTES
Pt medicated per MAR. Pt given ice water per request. Respirations even and unlabored, call light within reach.  [facility]  01/31/23 2026

## 2023-02-01 NOTE — ED NOTES
Pt refusing Nitro tablets at this time, states he took them yesterday with no relief. Provider made aware.       [place], [address] [place] [address]  01/31/23 0240

## 2023-02-01 NOTE — CONSULTS
[facility]  Cardiology Consult      Patient:  Katrin Cowart  YOB: 1970    MRN: [id number]   Acct: [de-identified]     Primary Care Physician: Osmani Avila MD    REASON FOR CONSULT:    Chest pain is similar in nature to prior heart attack, heart score of 5     CHIEF COMPLAINT:    Chest pain     HISTORY OF PRESENT ILLNESS:    Katrin Cowart is a pleasant 52year old male patient with past medical history that includes:   Past Medical History:   Diagnosis Date    Atherosclerotic heart disease of native coronary artery with unspecified angina pectoris 1/18/2022    Bipolar 2 disorder ([address]     previously followed with Dr. Alison Márquez and Nahed Archer in [facility]    BPH (benign prostatic hyperplasia)     COPD (chronic obstructive pulmonary disease) ([address]     Diabetes mellitus type 2, uncontrolled     HgbA1c on 4/2/2019 was 9.1. Diabetic peripheral neuropathy (HCC)     Diabetic polyneuropathy ([address]     Diabetic ulcer of right foot associated with type 2 diabetes mellitus ([address] 12/10/2015    Essential hypertension     \"never been on b/p medication that I know of\"    GERD (gastroesophageal reflux disease)     Hammer toe of left foot     Heart murmur     denies any chest pain or palpitations    History of tobacco abuse     Hx of AKA (above knee amputation), right ([address] 04/18/2019    Dr. Azael Karimi    Hyperlipidemia     Macular edema, diabetic, bilateral ([address] 05/04/2018    Dr. 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  He reports being \"usually compliant\" with his medications, but admits to missing his medications on one day last week. Echocardiogram on 11/2022 revealed an EF of 55%. Patient has h/o PAD, he is s/p right AKA. The patient was admitted to the hospital on 1/31/2023 after he presented to [facility] ER with chest pains. For the past few days, the patient has been experiencing recurrent episodes of chest pains, left sided, radiating to left arm on occasions with left arm numbness, without significant associated SOB. Chest pain became more severe, more constant, patient decided to present to ER. At this time, patient reports having some left sided chest pain. He is concerned that chest pain might be similar to what he had with prior MI. Troponin <0.01, <0.01, <0.01. EKG revealed SR with 1st degree AV block. CXR revealed no acute cardiopulmonary process. All labs, EKG's, diagnostic testing and images as well as cardiac cath, stress testing   were reviewed during this encounter    CARDIAC TESTING  Echo: 11/2022   Summary   Normal left ventricle size and systolic function. Ejection fraction was   estimated at 55 %. There were no regional left ventricular wall motion   abnormalities and wall thickness was within normal limits. Signature      ----------------------------------------------------------------   Electronically signed by Shon Wilson MD (Interpreting   physician) on 11/25/2022 at 08:24 PM   ----------------------------------------------------------------       Cath:11/2022  CORONARY ANGIOGRAM:  LEFT MAIN:  Patent without any significant obstruction. LAD:  Mild luminal irregularities but patent without any significant  obstruction. Diagonal 1 and diagonal 2 branches without any  obstruction. LCX:  Gives rise to large OM1, OM2 system without any obstruction. At  the takeoff of OM1, there is about 30 to 40% stenosis in AV groove of  circ. RCA:  Mid 80 to 90% stenosis. Gives rise to PDA and PLV both without  any obstruction.   RCA is dominant. INTERVENTION:  Given the findings on presentation, I proceeded with  intervention of the RCA. Exchanged out for 6-Kyrgyz JR4 guiding  catheter. Heparin IV was given. ACT was confirmed to be above 250  seconds. I wired the lesion using Runthrough wire. I then direct  stented the lesion using 2.5 x 18 Resolute Petey OLEG at nominal pressure. I postdilated the stent with 4.0 NC balloon up to 14 atmospheres. Post  PCI angiography demonstrated NANCI-3 flow without any perforation,  dissection, distal embolization, side branch loss, or guide or  guidewire-induced trauma. All equipment was removed from the patient. The patient tolerated the procedure well. IMMEDIATE COMPLICATIONS:  None. MEDICATIONS:  See MAR. ACCESS:  Vasc band used for hemostasis. ESTIMATED BLOOD LOSS:  Less than 50 mL. SUMMARY:  Successful PCI of the RCA with one drug-eluting stent. PLAN:    Past Medical History:    Past Medical History:   Diagnosis Date    Atherosclerotic heart disease of native coronary artery with unspecified angina pectoris 1/18/2022    Bipolar 2 disorder (HCC)     previously followed with Dr. Baron Runner and Carissa Gabriel in [facility]    BPH (benign prostatic hyperplasia)     COPD (chronic obstructive pulmonary disease) ([address]     Diabetes mellitus type 2, uncontrolled     HgbA1c on 4/2/2019 was 9.1.     Diabetic peripheral neuropathy (HCC)     Diabetic polyneuropathy ([address]     Diabetic ulcer of right foot associated with type 2 diabetes mellitus ([address] 12/10/2015    Essential hypertension     \"never been on b/p medication that I know of\"    GERD (gastroesophageal reflux disease)     Hammer toe of left foot     Heart murmur     denies any chest pain or palpitations    History of tobacco abuse     Hx of AKA (above knee amputation), right ([address] 04/18/2019    Dr. Marina Murillo    Hyperlipidemia     Macular edema, diabetic, bilateral ([address] 05/04/2018    Dr. 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 N/A 8/21/2020    CYSTOSCOPY, UROLIFT performed by Janna Closs., MD at [address] [facility], ESOPHAGUS      ENDOSCOPY, COLON, DIAGNOSTIC      FOOT DEBRIDEMENT Right 07/01/2016    I & D    GASTROCNEMIUS RECESSION Left 11/12/2021    LEFT LEG GASTROCS RECESSION performed by Nolia Closs, MD at [address] Right 2/18/2019    I&D RIGHT STUMP performed by Nabil Degroot MD at [address] [facility] Right 07/20/2016    LEG AMPUTATION BELOW KNEE Right 4/4/2019    I&D AND REVISION OF AMPUTATION RIGHT LEG performed by Nabil Degroot MD at . [address] Right 1/14/15    sole of foot I&D    SC I&D SHOULDER INFECTED BURSA Left 8/18/2017    LEFT SHOULDER INCISION AND DRAINAGE performed by Nabil Degroot MD at [address] at the [facility] OFFICE/OUTPT [address] Right 9/20/2018    EXCISIONAL DEBRIDEMENT RIGHT BKA STUMP performed by Larisa Pa MD at [place] [address] Right 1/16/15    2nd toe with wound vac applied    UPPER GASTROINTESTINAL ENDOSCOPY N/A 3/3/2020    EGD DILATION SAVORY performed by Byron Fox Frannie Ponce MD at [facility]    UPPER GASTROINTESTINAL ENDOSCOPY N/A 6/15/2022    EGD DILATION BALLOON performed by Filomena Panda MD at [address] [place] [address]  ? when       Medications Prior to Admission:    Medications Prior to Admission: Insulin Glargine, 2 Unit Dial, (TOUJEO MAX SOLOSTAR) 300 UNIT/ML SOPN, INJECT 85 UNITS IN THE MORNING, 80 UNITS IN THE EVENING. EVERY 10 DAYS INCREASE BY 5 UNITS BOTH TIMES, TO GET MORNING GLUCOSE 150-200.  gabapentin (NEURONTIN) 600 MG tablet,   lisinopril (PRINIVIL;ZESTRIL) 10 MG tablet, Take 1 tablet by mouth daily  levothyroxine (SYNTHROID) 50 MCG tablet, TAKE 1 TABLET BY MOUTH ONCE DAILY. Dulaglutide (TRULICITY) 4.5 UX/4.5OF SOPN, INJECT 4.5 MG INTO THE SKIN ONCE A WEEK .  empagliflozin (JARDIANCE) 25 MG tablet, TAKE 1 TABLET BY MOUTH ONCE DAILY.   furosemide (LASIX) 20 MG tablet, TAKE 1 TABLET BY MOUTH ONCE DAILY  THEN TAKE AS NEEDED FOR WEIGHT GAIN  atorvastatin (LIPITOR) 80 MG tablet, Take 1 tablet by mouth at bedtime  metoprolol tartrate (LOPRESSOR) 50 MG tablet, Take 1 tablet by mouth 2 times daily  ticagrelor (BRILINTA) 90 MG TABS tablet, Take 1 tablet by mouth 2 times daily  ascorbic acid (VITAMIN C) 500 MG tablet, Take 1 tablet by mouth daily  aspirin 81 MG chewable tablet, Take 1 tablet by mouth daily  zinc sulfate (ZINCATE) 220 (50 Zn) MG capsule, Take 1 capsule by mouth daily  Cholecalciferol (VITAMIN D) 25 MCG TABS, Take 1 tablet by mouth daily  insulin lispro (HUMALOG KWIKPEN U-200) 200 UNIT/ML SOPN pen, 40 units Plus scale before meals; only takes if BG >200 - Max dose 160 units per day (Patient taking differently: 40 units Plus scale before meals; only takes if BG >200 - Max dose 160 units per day   States not doing sliding scale)  Multiple Vitamins-Minerals (EYE VITAMINS PO), Take 1 tablet by mouth daily  Omega-3 Fatty Acids (FISH OIL PO), Take 1 caplet by mouth daily  Probiotic Product (PROBIOTIC PO), Take 1 tablet by mouth daily  Continuous Blood Gluc Sensor (DEXCOM G6 SENSOR) MISC, 1 Device by Does not apply route every 14 days  Continuous Blood Gluc Transmit (DEXCOM G6 TRANSMITTER) MISC, 1 Device by Does not apply route every 3 months  nitroGLYCERIN (NITROSTAT) 0.3 MG SL tablet, Place 1 tablet under the tongue every 5 minutes as needed for Chest pain up to max of 3 total doses. If no relief after 1 dose, call 911. Insulin Pen Needle (TRUEPLUS PEN NEEDLES) 31G X 8 MM MISC, USE AS DIRECTED  sertraline (ZOLOFT) 100 MG tablet, Take 100 mg by mouth daily   Continuous Blood Gluc  (DEXCOM G6 ) LIANNE, 1 Device by Does not apply route 4 times daily (before meals and nightly)  ARIPiprazole (ABILIFY) 5 MG tablet, Take 5 mg by mouth daily     Allergies:    Pcn [penicillins], Actos [pioglitazone], Clindamycin/lincomycin, Vancomycin, and Metformin and related    Social History:    reports that he quit smoking about 22 years ago. His smoking use included cigars and cigarettes. He started smoking about 38 years ago. He has never used smokeless tobacco. He reports that he does not currently use alcohol. He reports that he does not use drugs. Family History:   family history includes Arthritis in his maternal grandfather; Depression in his mother; Diabetes in his mother; Early Death in his mother; Heart Disease in his maternal grandfather; High Blood Pressure in his mother; High Cholesterol in his mother; Other in his mother; Vision Loss in his maternal grandmother. REVIEW OF SYSTEMS:  Constitutional: negative for anorexia, chills and fevers,weight change  Skin: negative for new skin rash per patient  HEENT: negative for head trauma or new visual changes  Respiratory: negative for cough, hemoptysis, wheezing  Cardiovascular: negative for  orthopnea, palpitations and syncope. Gastrointestinal: negative for abdominal pain,nausea , vomiting, constipation, diarrhea.   Hematologic/lymphatic: negative for bruising,prolonged bleeding,blood clots  Musculoskeletal:negative for muscle weakness, myalgias,wasting  Neurological: negative for coordination problems, dizziness, gait problems and vertigo  Behavioral/Psych:negative for mood/sleep disturbance      PHYSICAL EXAM:   Vitals:Patient Vitals for the past 24 hrs:   BP Temp Temp src Pulse Resp SpO2 Height Weight   02/01/23 1215 116/68 -- -- 82 -- 100 % -- --   02/01/23 1130 103/61 -- -- 84 -- 97 % -- --   02/01/23 1114 118/73 -- -- 83 -- 99 % -- --   02/01/23 0503 131/72 98 °F (36.7 °C) Oral 86 18 99 % -- --   02/01/23 0117 -- -- -- -- 18 -- -- --   02/01/23 0047 (!) 116/59 97.7 °F (36.5 °C) Oral 90 18 98 % -- --   01/31/23 2155 (!) 141/79 98.1 °F (36.7 °C) Oral 93 18 97 % 5' 6\" (1.676 m) 225 lb (102.1 kg)   01/31/23 2026 130/61 -- -- (!) 101 19 98 % -- --   01/31/23 2008 -- -- -- 99 -- -- -- --   01/31/23 1942 (!) 140/66 -- -- (!) 103 18 98 % -- --   01/31/23 1840 (!) 157/69 98.1 °F (36.7 °C) Oral (!) 107 16 100 % 5' 6\" (1.676 m) 225 lb (102.1 kg)       Last 3 weights: Wt Readings from Last 3 Encounters:   01/31/23 225 lb (102.1 kg)   12/19/22 224 lb (101.6 kg)   12/01/22 224 lb (101.6 kg)     24 hour intake/output:No intake or output data in the 24 hours ending 02/01/23 1255  BMI:Body mass index is 36.32 kg/m². General Appearance: alert and oriented to person, place and time, well developed and well- nourished, in no acute distress  Skin: warm and dry, no rash or erythema  Eyes: pupils equal, round, and reactive to light, extraocular eye movements intact, conjunctivae normal  Neck: supple and non-tender without mass, no thyromegaly or thyroid nodules, no cervical lymphadenopathy  Pulmonary/Chest: clear to auscultation bilaterally- no wheezes, rales or rhonchi, normal air movement, no respiratory distress  Cardiovascular: normal rate, regular rhythm, normal S1 and S2, no murmur.  No rubs, clicks, or gallops, distal pulses intact, no carotid bruits, Negative JVD  Radial Pulses: intact 2+  Abdomen: soft, non-tender, non-distended, normal bowel sounds, no masses or organomegaly  Extremities: no cyanosis, clubbing . no Edema  Musculoskeletal: normal range of motion, no joint swelling, deformity or tenderness      RADIOLOGY   XR CHEST PORTABLE    Result Date: 1/31/2023  Exam: One View of the chest Comparison: 7/14/2022 Findings: Hilar and mediastinal contours are within normal limits. Cardiac silhouette is not enlarged. No pneumothorax. No pleural fluid collection. No acute infiltrate     Impression: 1. No acute cardiopulmonary disease seen.  This document has been electronically signed by: Ingrid May MD on 01/31/2023 07:30 PM      LABS:  Recent Labs     01/31/23  1845 01/31/23  2228 02/01/23  0042   TROPONINT < 0.010 < 0.010 < 0.010     CBC:   Lab Results   Component Value Date/Time    WBC 10.9 02/01/2023 06:14 AM    RBC 4.84 02/01/2023 06:14 AM    HGB 15.2 02/01/2023 06:14 AM    HCT 43.7 02/01/2023 06:14 AM    MCV 90.3 02/01/2023 06:14 AM    MCH 31.4 02/01/2023 06:14 AM    MCHC 34.8 02/01/2023 06:14 AM    RDW 14.5 12/26/2020 09:39 AM     02/01/2023 06:14 AM    MPV 9.3 02/01/2023 06:14 AM     BMP:    Lab Results   Component Value Date/Time     02/01/2023 06:14 AM    K 4.1 02/01/2023 06:14 AM    K 4.0 11/23/2022 03:53 PM     02/01/2023 06:14 AM    CO2 22 02/01/2023 06:14 AM    BUN 15 02/01/2023 06:14 AM    LABALBU 3.6 11/24/2022 04:51 AM    CREATININE 0.7 02/01/2023 06:14 AM    CALCIUM 8.8 02/01/2023 06:14 AM    GFRAA >60 08/24/2016 05:54 PM    LABGLOM >60 02/01/2023 06:14 AM    GLUCOSE 209 02/01/2023 06:14 AM    GLUCOSE 114 12/26/2020 09:39 AM     Hepatic Function Panel:    Lab Results   Component Value Date/Time    ALKPHOS 68 11/24/2022 04:51 AM    ALT 20 11/24/2022 04:51 AM    AST 27 11/24/2022 04:51 AM    PROT 6.1 11/24/2022 04:51 AM    BILITOT 0.4 11/24/2022 04:51 AM    BILIDIR <0.2 07/14/2022 02:30 PM    IBILI 0.1 03/02/2015 11:30 AM    LABALBU 3.6 11/24/2022 04:51 AM Magnesium:    Lab Results   Component Value Date/Time    MG 2.2 11/23/2022 09:00 AM     Warfarin PT/INR:  No components found for: PTPATWAR, PTINRWAR  HgBA1c:    Lab Results   Component Value Date/Time    LABA1C 6.5 12/19/2022 10:39 AM    LABA1C 7.8 09/15/2022 09:06 AM     FLP:    Lab Results   Component Value Date/Time    TRIG 192 10/29/2021 04:30 PM    HDL 37 10/29/2021 04:30 PM    LDLCALC 92 10/29/2021 04:30 PM    LDLDIRECT 102.12 02/26/2021 10:53 AM     TSH:    Lab Results   Component Value Date/Time    TSH 2.480 11/23/2022 09:00 AM     BNP:   Lab Results   Component Value Date    BNP 19 12/26/2020       Heart Score = 5     ASSESSMENT:  Chest pain, unstable angina   CAD  H/o NSTEMI  S/p PCI of RCA 11/2022  DM  PAD  S/p right AKA  Dyslipidemia     RECOMMENDATIONS:  Has h/o CAD, PAD  On 11/2022, the patient was admitted for NSTEMI and underwent PCI/OLEG of RCA  Echocardiogram on 11/2022 revealed an EF of 55%   Troponin <0.01, <0.01, <0.01  EKG revealed SR with 1st degree AV block  He is concerned that chest pain might be similar to what he had with prior MI  Clinical presentation is worrisome for unstable angina   NTG gtt, titrate to chest pain  Repeat EKG and inform cardiology if chest pain recurs/worsens   Telemetry  ASA  Brilinta   Lipitor   Metoprolol   Keep NPO  A decision was made to proceed with cardiac catheterization with possible PCI as it it the recommended procedure for the patient. The indication, risks and benefits of the procedure and possible therapeutic consequences and alternatives were discussed with the patient. The patient was given the opportunity to ask questions and to have them answered to his/her satisfaction.  Risks of the procedure include but are not limited to the following: Bleeding, hematoma including retroperitoneal hematoma, infection, pain and discomfort, injury to the aorta and other blood vessels, rhythm disturbance, low blood pressure, myocardial infarction, need for bypass surgery, stroke, kidney damage/failure, myocardial perforation, allergic reactions to sedatives/contrast material, loss of pulse/vascular compromise requiring surgery, aneurysm/pseudoaneurysm formation, possible loss of a limb/hand/leg, death. Alternatives to and omission of the suggested procedure were discussed. The patient also understands the need for DAPT if PCI is necessary. The patient had no further questions and wished to proceed; the consent form was signed   Further recommendations pending cath findings     Above findings and plan of care were discussed with patient, questions were answered, agreeable to plan. Thank you for allowing me to participate in the care of this patient. Please let me know if I can be of any further assistance.       Lashell Mosqueda MD, [facility]   12:55 PM  2/1/2023

## 2023-02-01 NOTE — PROGRESS NOTES
Pt. refused 50 unit Lantus tonight despite education  Pt. did verbalize understanding and his blood sugar was 188 and he is currently NPO. Notified attending, Jens Piedra via perfect serve.

## 2023-02-01 NOTE — ED NOTES
Pt resting in bed, respirations even and unlabored. No needs expressed at this time. Call light within reach.  [address]  01/31/23 [id number]

## 2023-02-01 NOTE — ED NOTES
Pt transported to  28. Pt in stable condition. Floor contacted before transport spoke to juan pablo Maloney  01/31/23 [id number]

## 2023-02-01 NOTE — ED NOTES
ED to inpatient nurses report    Chief Complaint   Patient presents with    Chest Pain      Present to ED from home  LOC: alert and orientated to name, place, date  Vital signs   Vitals:    01/31/23 1840 01/31/23 1942 01/31/23 2008 01/31/23 2026   BP: (!) 157/69 (!) 140/66  130/61   Pulse: (!) 107 (!) 103 99 (!) 101   Resp: 16 18  19   Temp: 98.1 °F (36.7 °C)      TempSrc: Oral      SpO2: 100% 98%  98%   Weight: 225 lb (102.1 kg)      Height: 5' 6\" (1.676 m)         Oxygen Baseline RA    Current needs required RA Bipap/Cpap No  LDAs:   Peripheral IV 01/31/23 Right Forearm (Active)   Site Assessment Clean, dry & intact 01/31/23 2026   Line Status Normal saline locked; Flushed 01/31/23 2026   Phlebitis Assessment No symptoms 01/31/23 2026   Infiltration Assessment 0 01/31/23 2026   Dressing Status Clean, dry & intact 01/31/23 2026   Dressing Type Transparent 01/31/23 1849   Dressing Intervention New 01/31/23 1849     Mobility: Requires assistance * 1  Pending ED orders: complete  Present condition: stable    Electronically signed by Peter Clemente RN on 1/31/2023 at 8:57 PM     Peter Clemente RN  01/31/23 2057

## 2023-02-01 NOTE — H&P
[address] [address]  Sedation/Analgesia History & Physical    Pt Name: Shady Arreguin  Account number: [de-identified]  MRN: [id number]  YOB: 1970  Provider Performing Procedure: Carlos Murray DO MD  Referring Provider: Primitivo Perrin MD   Primary Care Physician: Primitivo Perrin MD  Date: 2/1/2023    PRE-PROCEDURE    Code Status: FULL CODE  Brief History/Pre-Procedure Diagnosis:   Chest pain, unstable angina   CAD  H/o NSTEMI  S/p PCI of RCA 11/2022  DM  PAD  S/p right AKA  Dyslipidemia      Consent: : I have discussed with the patient risks, benefits, and alternatives (and relevant risks, benefits, and side effects related to alternatives or not receiving care), and likelihood of the success. The patient and/or representative appear to understand and agree to proceed. The discussion encompasses risks, benefits, and side effects related to the alternatives and the risks related to not receiving the proposed care, treatment, and services. The indication, risks and benefits of the procedure and possible therapeutic consequences and alternatives were discussed with the patient. The patient was given the opportunity to ask questions and to have them answered to his/her satisfaction. Risks of the procedure include but are not limited to the following: Bleeding, hematoma including retroperitoneal hemmorhage, infection, pain and discomfort, injury to the aorta and other blood vessels, rhythm disturbance, low blood pressure, myocardial infarction, stroke, kidney damage/failure, myocardial perforation, allergic reactions to sedatives/contrast material, loss of pulse/vascular compromise requiring surgery, aneurysm/pseudoaneurysm formation, possible loss of a limb/hand/leg, needing blood transfusion, requiring emergent open heart surgery or emergent coronary intervention, the need for intubation/respiratory support, the requirement for defibrillation/cardioversion, and death.  Alternatives to and omission of the suggested procedure were discussed. The patient had no further questions and wished to proceed; the consent form was signed. MEDICAL HISTORY   has a past medical history of Atherosclerotic heart disease of native coronary artery with unspecified angina pectoris, Bipolar 2 disorder ([address] BPH (benign prostatic hyperplasia), COPD (chronic obstructive pulmonary disease) ([address] Diabetes mellitus type 2, uncontrolled, Diabetic peripheral neuropathy ([address] Diabetic polyneuropathy ([address] Diabetic ulcer of right foot associated with type 2 diabetes mellitus ([address] Essential hypertension, GERD (gastroesophageal reflux disease), Hammer toe of left foot, Heart murmur, History of tobacco abuse, Hx of AKA (above knee amputation), right ([address] Hyperlipidemia, Macular edema, diabetic, bilateral ([address] Marijuana abuse in remission, Melena, MRSA (methicillin resistant staph aureus) culture positive, Onychomycosis, Other disorders of kidney and ureter in diseases classified elsewhere, Sleep apnea, Thyroid disease, and 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 SURGICAL HISTORY   has a past surgical history that includes other surgical history (Right, 1/14/15); Abscess Drainage (Right); Toe amputation (Right, 1/16/15); Foot Debridement (Right, 07/01/2016); Leg amputation below knee (Right, 07/20/2016); Luckey tooth extraction (?when); pr i&d shoulder infected bursa (Left, 8/18/2017); pr office/outpt visit,procedure only (Right, 9/20/2018); incision and drainage (Right, 2/18/2019); Leg amputation below knee (Right, 4/4/2019); AMPUTATION ABOVE KNEE (Right, 4/18/2019); Upper gastrointestinal endoscopy (N/A, 3/3/2020); Cholecystectomy, laparoscopic (N/A, 4/1/2020); Endoscopy, colon, diagnostic; Cystoscopy (N/A, 7/20/2020); Cystoscopy (N/A, 8/21/2020); Gastrocnemius Recession (Left, 11/12/2021); Abdomen surgery;  Colonoscopy; Dilatation, esophagus; and Upper gastrointestinal endoscopy (N/A, 6/15/2022).   Additional information:       ALLERGIES   Allergies as of 01/31/2023 - Fully Reviewed 01/31/2023   Allergen Reaction Noted    Pcn [penicillins] Shortness Of Breath, Nausea And Vomiting, and Other (See Comments) 11/15/2014    Actos [pioglitazone] Swelling 06/11/2020    Clindamycin/lincomycin Itching 09/29/2015    Vancomycin Hives 05/13/2020    Metformin and related Swelling 05/14/2021     Additional information:       MEDICATIONS     Current Facility-Administered Medications:     insulin lispro (HUMALOG) injection vial 20 Units, 20 Units, SubCUTAneous, TID WC, Ashita Behl, MD    nitroGLYCERIN 50 mg in dextrose 5% 250 mL infusion, 5-200 mcg/min, IntraVENous, Continuous, Geri Hdz MD, Last Rate: 3 mL/hr at 02/01/23 1220, 10 mcg/min at 02/01/23 1220    nitroGLYCERIN (NITROSTAT) SL tablet 0.4 mg, 0.4 mg, SubLINGual, Q5 Min PRN, Rell Adam MD    sodium chloride flush 0.9 % injection 5-40 mL, 5-40 mL, IntraVENous, 2 times per day, Dandy Jo MD, 10 mL at 02/01/23 0910    sodium chloride flush 0.9 % injection 5-40 mL, 5-40 mL, IntraVENous, PRN, Ashita Behl, MD    0.9 % sodium chloride infusion, , IntraVENous, PRN, Ashita Behl, MD    ondansetron (ZOFRAN-ODT) disintegrating tablet 4 mg, 4 mg, Oral, Q8H PRN **OR** ondansetron (ZOFRAN) injection 4 mg, 4 mg, IntraVENous, Q6H PRN, Dandy Jo MD    acetaminophen (TYLENOL) tablet 650 mg, 650 mg, Oral, Q6H PRN **OR** acetaminophen (TYLENOL) suppository 650 mg, 650 mg, Rectal, Q6H PRN, Ashita Behl, MD    polyethylene glycol (GLYCOLAX) packet 17 g, 17 g, Oral, Daily PRN, Dandy Jo MD    aspirin chewable tablet 81 mg, 81 mg, Oral, Daily, Ashita Behl, MD    atorvastatin (LIPITOR) tablet 80 mg, 80 mg, Oral, Nightly, Ashita Behl, MD, 80 mg at 02/01/23 0400    heparin (porcine) injection 5,000 Units, 5,000 Units, SubCUTAneous, 3 times per day, Dandy Jo MD, 5,000 Units at 02/01/23 0554    ARIPiprazole (ABILIFY) tablet 5 mg, 5 mg, Oral, Daily, Annie Vu Piper MD    ascorbic acid (VITAMIN C) tablet 500 mg, 500 mg, Oral, Daily, Ashita Behl, MD    Vitamin D (CHOLECALCIFEROL) tablet 1,000 Units, 1,000 Units, Oral, Daily, Gayle Jerome MD    [Held by provider] furosemide (LASIX) tablet 20 mg, 20 mg, Oral, Daily, Ashita Behl, MD    gabapentin (NEURONTIN) tablet 600 mg, 600 mg, Oral, TID, Ashita Behl, MD, 600 mg at 02/01/23 0047    glucose chewable tablet 16 g, 4 tablet, Oral, PRN, Ashita Behl, MD    dextrose bolus 10% 125 mL, 125 mL, IntraVENous, PRN **OR** dextrose bolus 10% 250 mL, 250 mL, IntraVENous, PRN, Ashita Behl, MD    glucagon (rDNA) injection 1 mg, 1 mg, SubCUTAneous, PRN, Ashita Behl, MD    dextrose 10 % infusion, , IntraVENous, Continuous PRN, Gayle Jerome MD    levothyroxine (SYNTHROID) tablet 50 mcg, 50 mcg, Oral, Daily, Ashita Behl, MD, 50 mcg at 02/01/23 0554    [Held by provider] lisinopril (PRINIVIL;ZESTRIL) tablet 10 mg, 10 mg, Oral, Daily, Ashita Behl, MD    metoprolol tartrate (LOPRESSOR) tablet 50 mg, 50 mg, Oral, BID, Ashita Behl, MD, 50 mg at 02/01/23 0113    multivitamin 1 tablet, 1 tablet, Oral, Daily, Ashita Behl, MD    sertraline (ZOLOFT) tablet 100 mg, 100 mg, Oral, Daily, Ashita Behl, MD    ticagrelor (BRILINTA) tablet 90 mg, 90 mg, Oral, BID, Ashita Behl, MD, 90 mg at 02/01/23 0047    zinc sulfate (ZINCATE) capsule 50 mg, 50 mg, Oral, Daily, Ashita Behl, MD    insulin glargine (LANTUS) injection vial 50 Units, 50 Units, SubCUTAneous, BID, Ashita Behl, MD    morphine (PF) injection 2 mg, 2 mg, IntraVENous, Q4H PRN **OR** morphine injection 4 mg, 4 mg, IntraVENous, Q4H PRN, Gayle Jerome MD, 4 mg at 02/01/23 7743  Prior to Admission medications    Medication Sig Start Date End Date Taking? Authorizing Provider   Insulin Glargine, 2 Unit Dial, (TOUJEO MAX SOLOSTAR) 300 UNIT/ML SOPN INJECT 85 UNITS IN THE MORNING, 80 UNITS IN THE EVENING.  EVERY 10 DAYS INCREASE BY 5 UNITS BOTH TIMES, TO GET MORNING GLUCOSE 150-200. 1/17/23   Vega Case MD gabapentin (NEURONTIN) 600 MG tablet  12/7/22   Historical Provider, MD   lisinopril (PRINIVIL;ZESTRIL) 10 MG tablet Take 1 tablet by mouth daily 12/19/22   Phuc Ortega MD   levothyroxine (SYNTHROID) 50 MCG tablet TAKE 1 TABLET BY MOUTH ONCE DAILY. 12/19/22   Phuc Ortega MD   Dulaglutide (TRULICITY) 4.5 RD/2.5EI SOPN INJECT 4.5 MG INTO THE SKIN ONCE A WEEK . 12/19/22   Phuc Ortega MD   empagliflozin (JARDIANCE) 25 MG tablet TAKE 1 TABLET BY MOUTH ONCE DAILY.  12/19/22   Phuc Ortega MD   furosemide (LASIX) 20 MG tablet TAKE 1 TABLET BY MOUTH ONCE DAILY  THEN TAKE AS NEEDED FOR WEIGHT GAIN 12/19/22   Phuc Ortega MD   atorvastatin (LIPITOR) 80 MG tablet Take 1 tablet by mouth at bedtime 12/19/22   Elsa Robert MD   metoprolol tartrate (LOPRESSOR) 50 MG tablet Take 1 tablet by mouth 2 times daily 12/19/22   Elsa Robert MD   ticagrelor (BRILINTA) 90 MG TABS tablet Take 1 tablet by mouth 2 times daily 11/30/22   Phuc Ortega MD   ascorbic acid (VITAMIN C) 500 MG tablet Take 1 tablet by mouth daily 11/30/22   Elsa Robert MD   aspirin 81 MG chewable tablet Take 1 tablet by mouth daily 11/30/22   Phuc Ortega MD   zinc sulfate (ZINCATE) 220 (50 Zn) MG capsule Take 1 capsule by mouth daily 11/30/22   Elsa Robert MD   Cholecalciferol (VITAMIN D) 25 MCG TABS Take 1 tablet by mouth daily 11/26/22   Isaiah Tuttle MD   insulin lispro (HUMALOG KWIKPEN U-200) 200 UNIT/ML SOPN pen 40 units Plus scale before meals; only takes if BG >200 - Max dose 160 units per day  Patient taking differently: 40 units Plus scale before meals; only takes if BG >200 - Max dose 160 units per day     States not doing sliding scale 10/10/22   Phuc Ortega MD   Multiple Vitamins-Minerals (EYE VITAMINS PO) Take 1 tablet by mouth daily    Historical Provider, MD   Omega-3 Fatty Acids (FISH OIL PO) Take 1 caplet by mouth daily    Historical Provider, MD   Probiotic Product (PROBIOTIC PO) Take 1 tablet by mouth daily Historical Provider, MD   Continuous Blood Gluc Sensor (DEXCOM G6 SENSOR) MISC 1 Device by Does not apply route every 14 days 8/29/22   Maria R Bass MD   Continuous Blood Gluc Transmit (DEXCOM G6 TRANSMITTER) MISC 1 Device by Does not apply route every 3 months 8/29/22   Phuc Ortega MD   nitroGLYCERIN (NITROSTAT) 0.3 MG SL tablet Place 1 tablet under the tongue every 5 minutes as needed for Chest pain up to max of 3 total doses.  If no relief after 1 dose, call 911. 4/26/22   Maria R Bass MD   Insulin Pen Needle (TRUEPLUS PEN NEEDLES) 31G X 8 MM MISC USE AS DIRECTED 3/21/22   Phuc Ortega MD   sertraline (ZOLOFT) 100 MG tablet Take 100 mg by mouth daily  1/31/22   Historical Provider, MD   Continuous Blood Gluc  (DEXCOM G6 ) LIANNE 1 Device by Does not apply route 4 times daily (before meals and nightly) 3/22/21   MARILYN Curtis CNP   ARIPiprazole (ABILIFY) 5 MG tablet Take 5 mg by mouth daily  12/16/20   Historical Provider, MD     Additional information:       VITAL SIGNS   Vitals:    02/01/23 1430   BP: 103/76   Pulse: 83   Resp:    Temp:    SpO2: 99%       PHYSICAL:   General: No acute distress  HEENT:  Unremarkable for age  Neck: without increased JVD, carotid pulses 2+ bilaterally without bruits  Heart: RRR, S1 & S2 WNL, S4 gallop, without murmurs or rubs   NYHA: 2  Lungs: Clear to auscultation    Abdomen: BS present, without HSM, masses, or tenderness    Extremities: without C,C,E.  Pulses 2+ bilaterally  Mental Status: Alert & Oriented        PLANNED PROCEDURE   [x]Cath  [x]PCI                []Pacemaker/AICD  []MONIKA             []Cardioversion []Peripheral angiography/PTA  []Other:      SEDATION  Planned agent:[x]Midazolam []Meperidine [x]Sublimaze []Morphine  []Diazepam  []Other:     ASA Classification:  []1 []2 [x]3 []4 []5  Class 1: A normal healthy patient  Class 2: Pt with mild to moderate systemic disease  Class 3: Severe systemic disease or disturbance  Class 4: Severe systemic disorders that are already life threatening. Class 5: Moribund pt with little chances of survival, for more than 24 hours. Mallampati I Airway Classification:   []1 []2 [x]3 []4    [x]Pre-procedure diagnostic studies complete and results available. Comment:    [x]Previous sedation/anesthesia experiences assessed. Comment:    [x]The patient is an appropriate candidate to undergo the planned procedure sedation and anesthesia. (Refer to nursing sedation/analgesia documentation record)  [x]Formulation and discussion of sedation/procedure plan, risks, and expectations with patient and/or responsible adult completed. [x]Patient examined immediately prior to the procedure. (Refer to nursing sedation/analgesia documentation record)    Emile Alvarez DO MD   Electronically signed 2/1/2023 at 3:11 PM    Attending Supervising Physician's R [address]  I performed a history and physical examination on the patient and discussed the management with the resident physician. I reviewed and agree with the findings and plan as documented in the resident's note.     Electronically signed by Sekou Melton MD on 2/2/23 at 6:46 AM EST

## 2023-02-01 NOTE — CARE COORDINATION
Case Management Assessment  Initial Evaluation    Date/Time of Evaluation: 2/1/2023 12:15 PM  Assessment Completed by: Pablo Acuna RN    If patient is discharged prior to next notation, then this note serves as note for discharge by case management. Patient Name: Mac Michele                   YOB: 1970  Diagnosis: Chest pain [R07.9]  Chest pain, unspecified type [R07.9]                   Date / Time: 1/31/2023  6:34 PM  Location: [address] [place] [address]     Patient Admission Status: Inpatient   Readmission Risk (Low < 19, Mod (19-27), High > 27): Readmission Risk Score: 16.3    Current PCP: Matheus Hernandez MD  PCP verified by CM? Yes    Chart Reviewed: Yes      History Provided by: Patient  Patient Orientation: Alert and Oriented    Patient Cognition: Alert    Hospitalization in the last 30 days (Readmission):  No    If yes, Readmission Assessment in CM Navigator will be completed. Advance Directives:      Code Status: Full Code   Patient's Primary Decision Maker is: Legal Next of Kin      Discharge Planning:    Patient lives with: Other (Comment) (Room mates) Type of Home: House  Primary Care Giver:    Patient Support Systems include: Children   Current Financial resources: Medicare, Medicaid  Current community resources:    Current services prior to admission: None            Current DME: wheelchair, walker, rt leg prosthesis             Type of Home Care services:  None    ADLS  Prior functional level: Independent in ADLs/IADLs  Current functional level: Independent in ADLs/IADLs    Family can provide assistance at DC: No  Would you like Case Management to discuss the discharge plan with any other family members/significant others, and if so, who?  No  Plans to Return to Present Housing: Yes  Other Identified Issues/Barriers to RETURNING to current housing: No  Potential Assistance needed at discharge: N/A            Potential DME:    Patient expects to discharge to: [address] [place] [address] for transportation at discharge: Other (see comment) (RTA)    Financial    Payor: Benita Ordoñez / Plan: Shade Lu / Product Type: *No Product type* /     Does insurance require precert for SNF: No    Potential assistance Purchasing Medications: No  Meds-to-Beds request: Yes      [address], [address] [facility]  [address]  [address]  Phone: [phone] Fax: [phone]    [facility] [place]  [address] [phone] Sue Esqueda [phone]  [address] [place] [phone]  Phone: [phone] Fax: [phone]      Notes:    Factors facilitating achievement of predicted outcomes: Cooperative    Barriers to discharge: Does not drive. Pt is rt BKA. Additional Case Management Notes: Admitted from ER with CP. Hx CAD and stent. On Brilinta. Na +131. Trops neg. Cardiology consulted. This am VSS. Room air. Possible Cardiac Cath today. Procedure: No    The Plan for Transition of Care is related to the following treatment goals of Chest pain [R07.9]  Chest pain, unspecified type [R07.9]    Patient Goals/Plan/Treatment Preferences: Met with Stephanie Pelletier today. From home where he resides with other housemates. He tends to his own needs. He has a wheelchair, walker and rt leg prosthesis. He is insured and has a PCP. Transportation/Food Security/Housekeeping Addressed: No issues identified.      Jarret Burton RN  Case Management Department

## 2023-02-01 NOTE — PLAN OF CARE
Problem: Discharge Planning  Goal: Discharge to home or other facility with appropriate resources  Outcome: Progressing  Flowsheets (Taken 2/1/2023 0800)  Discharge to home or other facility with appropriate resources: Identify barriers to discharge with patient and caregiver     Problem: Pain  Goal: Verbalizes/displays adequate comfort level or baseline comfort level  Outcome: Progressing     Problem: Safety - Adult  Goal: Free from fall injury  Outcome: Progressing  Flowsheets (Taken 2/1/2023 1345)  Free From Fall Injury: Instruct family/caregiver on patient safety     Problem: ABCDS Injury Assessment  Goal: Absence of physical injury  Outcome: Progressing  Flowsheets (Taken 2/1/2023 1345)  Absence of Physical Injury: Implement safety measures based on patient assessment

## 2023-02-02 ENCOUNTER — APPOINTMENT (OUTPATIENT)
Dept: CARDIAC CATH/INVASIVE PROCEDURES | Age: 53
DRG: 287 | End: 2023-02-02
Payer: MEDICARE

## 2023-02-02 VITALS
TEMPERATURE: 98.2 F | HEART RATE: 95 BPM | OXYGEN SATURATION: 97 % | SYSTOLIC BLOOD PRESSURE: 107 MMHG | DIASTOLIC BLOOD PRESSURE: 61 MMHG | HEIGHT: 66 IN | BODY MASS INDEX: 36.16 KG/M2 | WEIGHT: 225 LBS | RESPIRATION RATE: 16 BRPM

## 2023-02-02 LAB
ALBUMIN SERPL BCG-MCNC: 3.3 G/DL (ref 3.5–5.1)
ALP SERPL-CCNC: 75 U/L (ref 38–126)
ALT SERPL W/O P-5'-P-CCNC: < 5 U/L (ref 11–66)
AMYLASE SERPL-CCNC: 89 U/L (ref 20–104)
ANION GAP SERPL CALC-SCNC: 15 MEQ/L (ref 8–16)
AST SERPL-CCNC: 31 U/L (ref 5–40)
BASOPHILS ABSOLUTE: 0.1 THOU/MM3 (ref 0–0.1)
BASOPHILS NFR BLD AUTO: 0.6 %
BILIRUB CONJ SERPL-MCNC: < 0.2 MG/DL (ref 0–0.3)
BILIRUB SERPL-MCNC: 0.7 MG/DL (ref 0.3–1.2)
BUN SERPL-MCNC: 18 MG/DL (ref 7–22)
CALCIUM SERPL-MCNC: 8.1 MG/DL (ref 8.5–10.5)
CHLORIDE SERPL-SCNC: 101 MEQ/L (ref 98–111)
CHOLEST SERPL-MCNC: 120 MG/DL (ref 100–199)
CO2 SERPL-SCNC: 18 MEQ/L (ref 23–33)
CREAT SERPL-MCNC: 0.8 MG/DL (ref 0.4–1.2)
DEPRECATED RDW RBC AUTO: 49.6 FL (ref 35–45)
EOSINOPHIL NFR BLD AUTO: 5.2 %
EOSINOPHILS ABSOLUTE: 0.5 THOU/MM3 (ref 0–0.4)
ERYTHROCYTE [DISTWIDTH] IN BLOOD BY AUTOMATED COUNT: 14.4 % (ref 11.5–14.5)
GFR SERPL CREATININE-BSD FRML MDRD: > 60 ML/MIN/1.73M2
GLUCOSE BLD STRIP.AUTO-MCNC: 156 MG/DL (ref 70–108)
GLUCOSE BLD STRIP.AUTO-MCNC: 185 MG/DL (ref 70–108)
GLUCOSE BLD STRIP.AUTO-MCNC: 185 MG/DL (ref 70–108)
GLUCOSE SERPL-MCNC: 151 MG/DL (ref 70–108)
HCT VFR BLD AUTO: 41.6 % (ref 42–52)
HDLC SERPL-MCNC: 27 MG/DL
HGB BLD-MCNC: 14.2 GM/DL (ref 14–18)
IMM GRANULOCYTES # BLD AUTO: 0.05 THOU/MM3 (ref 0–0.07)
IMM GRANULOCYTES NFR BLD AUTO: 0.5 %
LDLC SERPL CALC-MCNC: 53 MG/DL
LIPASE SERPL-CCNC: 138.8 U/L (ref 5.6–51.3)
LYMPHOCYTES ABSOLUTE: 2.1 THOU/MM3 (ref 1–4.8)
LYMPHOCYTES NFR BLD AUTO: 23.1 %
MCH RBC QN AUTO: 32.1 PG (ref 26–33)
MCHC RBC AUTO-ENTMCNC: 34.1 GM/DL (ref 32.2–35.5)
MCV RBC AUTO: 94.1 FL (ref 80–94)
MONOCYTES ABSOLUTE: 0.8 THOU/MM3 (ref 0.4–1.3)
MONOCYTES NFR BLD AUTO: 8.4 %
NEUTROPHILS NFR BLD AUTO: 62.2 %
NRBC BLD AUTO-RTO: 0 /100 WBC
PLATELET # BLD AUTO: 204 THOU/MM3 (ref 130–400)
PMV BLD AUTO: 9.4 FL (ref 9.4–12.4)
POTASSIUM SERPL-SCNC: 4.2 MEQ/L (ref 3.5–5.2)
PROT SERPL-MCNC: 6.8 G/DL (ref 6.1–8)
RBC # BLD AUTO: 4.42 MILL/MM3 (ref 4.7–6.1)
REASON FOR REJECTION: NORMAL
REJECTED TEST: NORMAL
SEGMENTED NEUTROPHILS ABSOLUTE COUNT: 5.8 THOU/MM3 (ref 1.8–7.7)
SODIUM SERPL-SCNC: 134 MEQ/L (ref 135–145)
TRIGL SERPL-MCNC: 200 MG/DL (ref 0–199)
WBC # BLD AUTO: 9.3 THOU/MM3 (ref 4.8–10.8)

## 2023-02-02 PROCEDURE — 99239 HOSP IP/OBS DSCHRG MGMT >30: CPT | Performed by: INTERNAL MEDICINE

## 2023-02-02 PROCEDURE — 82248 BILIRUBIN DIRECT: CPT

## 2023-02-02 PROCEDURE — 6370000000 HC RX 637 (ALT 250 FOR IP): Performed by: INTERNAL MEDICINE

## 2023-02-02 PROCEDURE — 2580000003 HC RX 258: Performed by: INTERNAL MEDICINE

## 2023-02-02 PROCEDURE — 6370000000 HC RX 637 (ALT 250 FOR IP): Performed by: STUDENT IN AN ORGANIZED HEALTH CARE EDUCATION/TRAINING PROGRAM

## 2023-02-02 PROCEDURE — C1760 CLOSURE DEV, VASC: HCPCS

## 2023-02-02 PROCEDURE — 93458 L HRT ARTERY/VENTRICLE ANGIO: CPT

## 2023-02-02 PROCEDURE — B2111ZZ FLUOROSCOPY OF MULTIPLE CORONARY ARTERIES USING LOW OSMOLAR CONTRAST: ICD-10-PCS | Performed by: INTERNAL MEDICINE

## 2023-02-02 PROCEDURE — 2580000003 HC RX 258: Performed by: STUDENT IN AN ORGANIZED HEALTH CARE EDUCATION/TRAINING PROGRAM

## 2023-02-02 PROCEDURE — 83690 ASSAY OF LIPASE: CPT

## 2023-02-02 PROCEDURE — 6360000002 HC RX W HCPCS

## 2023-02-02 PROCEDURE — 85025 COMPLETE CBC W/AUTO DIFF WBC: CPT

## 2023-02-02 PROCEDURE — C1769 GUIDE WIRE: HCPCS

## 2023-02-02 PROCEDURE — 6360000004 HC RX CONTRAST MEDICATION: Performed by: INTERNAL MEDICINE

## 2023-02-02 PROCEDURE — 4A023N7 MEASUREMENT OF CARDIAC SAMPLING AND PRESSURE, LEFT HEART, PERCUTANEOUS APPROACH: ICD-10-PCS | Performed by: INTERNAL MEDICINE

## 2023-02-02 PROCEDURE — 80061 LIPID PANEL: CPT

## 2023-02-02 PROCEDURE — 2500000003 HC RX 250 WO HCPCS

## 2023-02-02 PROCEDURE — 36415 COLL VENOUS BLD VENIPUNCTURE: CPT

## 2023-02-02 PROCEDURE — 82948 REAGENT STRIP/BLOOD GLUCOSE: CPT

## 2023-02-02 PROCEDURE — 80053 COMPREHEN METABOLIC PANEL: CPT

## 2023-02-02 PROCEDURE — 82150 ASSAY OF AMYLASE: CPT

## 2023-02-02 PROCEDURE — 6360000002 HC RX W HCPCS: Performed by: STUDENT IN AN ORGANIZED HEALTH CARE EDUCATION/TRAINING PROGRAM

## 2023-02-02 PROCEDURE — C1894 INTRO/SHEATH, NON-LASER: HCPCS

## 2023-02-02 PROCEDURE — 93458 L HRT ARTERY/VENTRICLE ANGIO: CPT | Performed by: INTERNAL MEDICINE

## 2023-02-02 RX ORDER — CEPHALEXIN 500 MG/1
500 CAPSULE ORAL EVERY 8 HOURS SCHEDULED
Status: DISCONTINUED | OUTPATIENT
Start: 2023-02-02 | End: 2023-02-02 | Stop reason: HOSPADM

## 2023-02-02 RX ORDER — ACETAMINOPHEN 325 MG/1
650 TABLET ORAL EVERY 4 HOURS PRN
Status: DISCONTINUED | OUTPATIENT
Start: 2023-02-02 | End: 2023-02-02 | Stop reason: SDUPTHER

## 2023-02-02 RX ORDER — SODIUM CHLORIDE 0.9 % (FLUSH) 0.9 %
5-40 SYRINGE (ML) INJECTION EVERY 12 HOURS SCHEDULED
Status: DISCONTINUED | OUTPATIENT
Start: 2023-02-02 | End: 2023-02-02 | Stop reason: HOSPADM

## 2023-02-02 RX ORDER — POLYETHYLENE GLYCOL 3350 17 G/17G
17 POWDER, FOR SOLUTION ORAL DAILY PRN
Qty: 527 G | Refills: 1 | Status: SHIPPED | OUTPATIENT
Start: 2023-02-02 | End: 2023-03-04

## 2023-02-02 RX ORDER — ATROPINE SULFATE 0.4 MG/ML
0.5 INJECTION, SOLUTION INTRAVENOUS
Status: DISCONTINUED | OUTPATIENT
Start: 2023-02-02 | End: 2023-02-02 | Stop reason: HOSPADM

## 2023-02-02 RX ORDER — CEPHALEXIN 500 MG/1
500 CAPSULE ORAL EVERY 8 HOURS SCHEDULED
Qty: 20 CAPSULE | Refills: 0 | Status: SHIPPED | OUTPATIENT
Start: 2023-02-02 | End: 2023-02-09

## 2023-02-02 RX ORDER — LISINOPRIL 5 MG/1
5 TABLET ORAL DAILY
Status: DISCONTINUED | OUTPATIENT
Start: 2023-02-03 | End: 2023-02-02 | Stop reason: HOSPADM

## 2023-02-02 RX ORDER — GABAPENTIN 600 MG/1
600 TABLET ORAL 3 TIMES DAILY
Qty: 90 TABLET | Refills: 3 | Status: SHIPPED | OUTPATIENT
Start: 2023-02-02 | End: 2023-02-08

## 2023-02-02 RX ORDER — SODIUM CHLORIDE 0.9 % (FLUSH) 0.9 %
5-40 SYRINGE (ML) INJECTION PRN
Status: DISCONTINUED | OUTPATIENT
Start: 2023-02-02 | End: 2023-02-02 | Stop reason: HOSPADM

## 2023-02-02 RX ORDER — 0.9 % SODIUM CHLORIDE 0.9 %
500 INTRAVENOUS SOLUTION INTRAVENOUS ONCE
Status: COMPLETED | OUTPATIENT
Start: 2023-02-02 | End: 2023-02-02

## 2023-02-02 RX ORDER — SODIUM CHLORIDE 9 MG/ML
INJECTION, SOLUTION INTRAVENOUS PRN
Status: DISCONTINUED | OUTPATIENT
Start: 2023-02-02 | End: 2023-02-02 | Stop reason: HOSPADM

## 2023-02-02 RX ORDER — LISINOPRIL 5 MG/1
5 TABLET ORAL DAILY
Qty: 30 TABLET | Refills: 3 | Status: SHIPPED | OUTPATIENT
Start: 2023-02-03

## 2023-02-02 RX ORDER — PANTOPRAZOLE SODIUM 40 MG/1
40 TABLET, DELAYED RELEASE ORAL
Status: DISCONTINUED | OUTPATIENT
Start: 2023-02-02 | End: 2023-02-02 | Stop reason: HOSPADM

## 2023-02-02 RX ORDER — FUROSEMIDE 20 MG/1
20 TABLET ORAL DAILY
Qty: 60 TABLET | Refills: 3 | Status: SHIPPED | OUTPATIENT
Start: 2023-02-02

## 2023-02-02 RX ORDER — SODIUM CHLORIDE 9 MG/ML
INJECTION, SOLUTION INTRAVENOUS CONTINUOUS
Status: DISCONTINUED | OUTPATIENT
Start: 2023-02-02 | End: 2023-02-02 | Stop reason: HOSPADM

## 2023-02-02 RX ADMIN — SODIUM CHLORIDE 500 ML: 9 INJECTION, SOLUTION INTRAVENOUS at 12:38

## 2023-02-02 RX ADMIN — ARIPIPRAZOLE 5 MG: 5 TABLET ORAL at 08:45

## 2023-02-02 RX ADMIN — Medication 1 TABLET: at 08:43

## 2023-02-02 RX ADMIN — OXYCODONE HYDROCHLORIDE AND ACETAMINOPHEN 500 MG: 500 TABLET ORAL at 08:46

## 2023-02-02 RX ADMIN — IOPAMIDOL 75 ML: 755 INJECTION, SOLUTION INTRAVENOUS at 07:29

## 2023-02-02 RX ADMIN — TICAGRELOR 90 MG: 90 TABLET ORAL at 06:35

## 2023-02-02 RX ADMIN — CEPHALEXIN 500 MG: 500 CAPSULE ORAL at 13:59

## 2023-02-02 RX ADMIN — PANTOPRAZOLE SODIUM 40 MG: 40 TABLET, DELAYED RELEASE ORAL at 09:57

## 2023-02-02 RX ADMIN — INSULIN GLARGINE 25 UNITS: 100 INJECTION, SOLUTION SUBCUTANEOUS at 08:43

## 2023-02-02 RX ADMIN — GABAPENTIN 600 MG: 600 TABLET, FILM COATED ORAL at 08:46

## 2023-02-02 RX ADMIN — SERTRALINE 100 MG: 100 TABLET, FILM COATED ORAL at 08:43

## 2023-02-02 RX ADMIN — Medication 50 MG: at 08:42

## 2023-02-02 RX ADMIN — HEPARIN SODIUM 5000 UNITS: 5000 INJECTION INTRAVENOUS; SUBCUTANEOUS at 13:59

## 2023-02-02 RX ADMIN — Medication 1000 UNITS: at 08:43

## 2023-02-02 RX ADMIN — SODIUM CHLORIDE, PRESERVATIVE FREE 10 ML: 5 INJECTION INTRAVENOUS at 08:46

## 2023-02-02 RX ADMIN — ASPIRIN 81 MG: 81 TABLET, CHEWABLE ORAL at 05:12

## 2023-02-02 RX ADMIN — SODIUM CHLORIDE: 9 INJECTION, SOLUTION INTRAVENOUS at 14:47

## 2023-02-02 RX ADMIN — ONDANSETRON 4 MG: 2 INJECTION INTRAMUSCULAR; INTRAVENOUS at 12:34

## 2023-02-02 RX ADMIN — GABAPENTIN 600 MG: 600 TABLET, FILM COATED ORAL at 13:59

## 2023-02-02 RX ADMIN — LEVOTHYROXINE SODIUM 50 MCG: 0.05 TABLET ORAL at 05:12

## 2023-02-02 NOTE — PROGRESS NOTES
Pt was sitting on the chair beside his bed at the time of the visit. He was dealing with chest pain and was looking anxious. He wanted prayer to cope and heal. Prayer was appreciated.     02/02/23 1447   Encounter Summary   Encounter Overview/Reason  Initial Encounter   Service Provided For: Patient   Referral/Consult From: Jacboy   Last Encounter  02/02/23   Complexity of Encounter Low   Begin Time 1530   End Time  1535   Total Time Calculated 5 min   Spiritual/Emotional needs   Type Spiritual Support   Assessment/Intervention/Outcome   Assessment Anxious   Intervention Empowerment

## 2023-02-02 NOTE — DISCHARGE SUMMARY
Hospital Medicine Discharge Summary      Patient Identification:   Halie Kramer   : 1970  MRN: [id number]   Account: [de-identified]   Patient's PCP: Chapito Ricardo MD    Admit Date: 2023   Discharge Date:   2023      Admitting Physician: Katherine Ochoa MD  Discharge Physician: Chapito Ricardo MD       Discharge Diagnoses:  Chest pain, concerning for ACS: Heart score of 5. Chest pain similar to nature to previous heart attack. Initial troponin  negative and EKG was without acute changes. Other possibility is musculoskeletal (as chest pain is reproducible) versus GERD. Received aspirin 324 mg at ED, will continue home aspirin and Brilinta. Continue home high intensity statin. trops x negative,  On telemetry. Cardiology consulted they  recommended LHC , was done via femoral route       Non obstructive, stent site is intact        Further work up as outpatient, including an EGD, previously        Seen dr. Peterson Ribeiro, who has done an Endo. Will refer back to his         Office. , for non cardiac causes of CP. He is on PPI's    History of CAD status post OLEG to RCA on : Continue home aspirin, statin, metoprolol and Brilinta., tele . Admits to compliance       With meds. HTN  - BP is running low, IVF given as he had normal EF, and did   Cut down the ACE and BB today and I spoke with his nurse today . Leukocytosis: White count of 11, likely stress response. Will monitor. , no evidence of infection . Diabetes mellitus type 2 with neuropathy: On Humalog 40 units 3 times daily and 85 units glargine in a.m. and 80 units in p.m. Have reduced short acting insulin to 20 units 3 times daily (should be held if remains n.p.o) and Lantus to 50 units twice daily as currently n.p.o. Sugars range 150 - 200 with last A1c of  6.5      Chronic HFpEF: EF of 55% on 2022. Hx of grade 1 diastolic dysfunction.   No signs or symptoms of acute heart failure as chest x-ray negative and proBNP within normal limit. continue home metoprolol. Holding home lisinopril and Lasix due to cath today . History of AKA: Noted  Hypothyroidism: Continue home Synthroid  Hyperlipidemia: Continue home statin  History of bipolar 2 disorder: Continue home Abilify and sertraline. Hospital Course:     Jenny Robertson is a 52 y.o. male with PMHx  CAD with stent , normal EF,   And DM-2  admitted to [facility] on 1/31/2023 for CP. Underwent a cath today , no new blockage. This was d/w Rasheeda Zamudio, and his sugars are well controlled, and he will be discharged today , and for   Outpatient evaluation by GI team, dr. Domonique Friend. ?? Low grade cellulitis  Of the LLE, so keflex has been ordered, and he will continue with the same, took in the past w/o any issues, no fever. The patient was seen and examined on day of discharge and this discharge summary is in conjunction with any daily progress note from day of discharge. The patient is discharged in stable condition. Exam:   Vitals:  Vitals:    02/02/23 1040 02/02/23 1131 02/02/23 1240 02/02/23 1340   BP: (!) 90/59 (!) 96/50 (!) 72/62 107/61   Pulse: 91 91 92 95   Resp:       Temp:       TempSrc:       SpO2: 100% 100% 96% 97%   Weight:       Height:         Weight: Weight: 225 lb (102.1 kg)     General appearance: No apparent distress, well developed, appears stated age. Eyes:  Pupils equal, round, and reactive to light. Conjunctivae/corneas clear. HENT: Head normal in appearance. External nares normal.  Oral mucosa moist without lesions. Hearing grossly intact. Neck: Supple, with full range of motion. Trachea midline. No gross JVD appreciated. Respiratory:  Normal respiratory effort. Clear to auscultation, bilaterally without rales or wheezes or rhonchi. Cardiovascular: Normal rate, regular rhythm with normal S1/S2 without murmurs. No lower extremity edema.    Abdomen: Soft, non-tender, non-distended with normal bowel sounds. Musculoskeletal: There is no joint swelling or tenderness. Normal tone. No abnormal movements. Skin: Warm and dry. No rashes or lesions. Neurologic:  No focal sensory/motor deficits in the upper and lower extremities. Cranial nerves:  grossly non-focal 2-12. Psychiatric: Alert and oriented, normal insight and thought content. Capillary Refill: Brisk,< 3 seconds. Peripheral Pulses: +2 palpable, equal bilaterally. Significant Diagnostic Studies    Labs: For convenience and continuity at follow-up the following most recent labs are provided:  CBC:    Lab Results   Component Value Date/Time    WBC 9.3 02/02/2023 07:44 AM    HGB 14.2 02/02/2023 07:44 AM    HCT 41.6 02/02/2023 07:44 AM     02/02/2023 07:44 AM     Renal:    Lab Results   Component Value Date/Time     02/02/2023 07:44 AM    K 4.2 02/02/2023 07:44 AM    K 4.0 11/23/2022 03:53 PM     02/02/2023 07:44 AM    CO2 18 02/02/2023 07:44 AM    BUN 18 02/02/2023 07:44 AM    CREATININE 0.8 02/02/2023 07:44 AM    CALCIUM 8.1 02/02/2023 07:44 AM    PHOS 2.6 11/23/2022 09:00 AM       Radiology:   XR CHEST PORTABLE   Final Result   Impression:   1. No acute cardiopulmonary disease seen. This document has been electronically signed by: Gilson Acevedo MD on    01/31/2023 07:30 PM             Consults:   IP CONSULT TO CARDIOLOGY      Disposition:  Home   Condition at Discharge: Stable    Code Status:  Full Code     Patient Instructions:    Discharge lab work: Activity: activity as tolerated  Diet: ADULT DIET; Clear Liquid      Follow-up visits:   Pratibha Castaneda MD  [facility]. [phone]    Follow up in 2 week(s)  Office will contact patient for a new patient appointment.     Raghavendra Clement MD  [address] [place] [address]  [phone]    Follow up on 2/3/2023  at 215pm         Discharge Medications:        Medication List        START taking these medications      cephALEXin 500 MG capsule  Commonly known as: KEFLEX  Take 1 capsule by mouth every 8 hours for 20 doses     polyethylene glycol 17 g packet  Commonly known as: GLYCOLAX  Take 17 g by mouth daily as needed for Constipation            CHANGE how you take these medications      furosemide 20 MG tablet  Commonly known as: LASIX  Take 1 tablet by mouth daily  What changed:   how much to take  how to take this  when to take this  additional instructions     gabapentin 600 MG tablet  Commonly known as: NEURONTIN  Take 1 tablet by mouth 3 times daily for 90 doses. What changed:   how much to take  how to take this  when to take this     insulin lispro 200 UNIT/ML Sopn pen  Commonly known as: HUMALOG KWIKPEN U-200  40 units Plus scale before meals; only takes if BG >200 - Max dose 160 units per day  What changed: additional instructions     lisinopril 5 MG tablet  Commonly known as: PRINIVIL;ZESTRIL  Take 1 tablet by mouth daily Start this med in two days from 2/4/23  Start taking on: February 3, 2023  What changed:   medication strength  how much to take  additional instructions     * metoprolol tartrate 50 MG tablet  Commonly known as: LOPRESSOR  Take 1 tablet by mouth 2 times daily  What changed: Another medication with the same name was added. Make sure you understand how and when to take each. * metoprolol tartrate 25 MG tablet  Commonly known as: LOPRESSOR  Take 1 tablet by mouth 2 times daily Re start tomorrow evening 2/3  What changed: You were already taking a medication with the same name, and this prescription was added. Make sure you understand how and when to take each. * This list has 2 medication(s) that are the same as other medications prescribed for you. Read the directions carefully, and ask your doctor or other care provider to review them with you.                 CONTINUE taking these medications      ARIPiprazole 5 MG tablet  Commonly known as: ABILIFY     ascorbic acid 500 MG tablet  Commonly known as: VITAMIN C  Take 1 tablet by mouth daily     aspirin 81 MG chewable tablet  Take 1 tablet by mouth daily     atorvastatin 80 MG tablet  Commonly known as: LIPITOR  Take 1 tablet by mouth at bedtime     Dexcom G6  Lelia  1 Device by Does not apply route 4 times daily (before meals and nightly)     Dexcom G6 Sensor Misc  1 Device by Does not apply route every 14 days     Dexcom G6 Transmitter Misc  1 Device by Does not apply route every 3 months     empagliflozin 25 MG tablet  Commonly known as: Jardiance  TAKE 1 TABLET BY MOUTH ONCE DAILY. EYE VITAMINS PO     FISH OIL PO     levothyroxine 50 MCG tablet  Commonly known as: SYNTHROID  TAKE 1 TABLET BY MOUTH ONCE DAILY. nitroGLYCERIN 0.3 MG SL tablet  Commonly known as: Nitrostat  Place 1 tablet under the tongue every 5 minutes as needed for Chest pain up to max of 3 total doses. If no relief after 1 dose, call 911. PROBIOTIC PO     sertraline 100 MG tablet  Commonly known as: ZOLOFT     ticagrelor 90 MG Tabs tablet  Commonly known as: BRILINTA  Take 1 tablet by mouth 2 times daily     Toujeo Eliazar SoloStar 300 UNIT/ML Sopn  Generic drug: Insulin Glargine (2 Unit Dial)  INJECT 85 UNITS IN THE MORNING, 80 UNITS IN THE EVENING. EVERY 10 DAYS INCREASE BY 5 UNITS BOTH TIMES, TO GET MORNING GLUCOSE 150-200. TRUEplus Pen Needles 31G X 8 MM Misc  Generic drug: Insulin Pen Needle  USE AS DIRECTED     Trulicity 4.5 OR/3.5HA Sopn  Generic drug: Dulaglutide  INJECT 4.5 MG INTO THE SKIN ONCE A WEEK .      Vitamin D3 25 MCG Tabs  Take 1 tablet by mouth daily     zinc sulfate 220 (50 Zn) MG capsule  Commonly known as: ZINCATE  Take 1 capsule by mouth daily               Where to Get Your Medications        These medications were sent to [facility] [address], [address]  [address] [place] [address]  [address] [place] [address]      Phone: [phone]   cephALEXin 500 MG capsule  furosemide 20 MG tablet  gabapentin 600 MG tablet  lisinopril 5 MG tablet  metoprolol tartrate 25 MG tablet  polyethylene glycol 17 g packet            Discharge Medications for PCI/MI (performed or attempted):       NA         Time Spent on discharge is 36 minutes in the examination, evaluation, counseling and review of medications and discharge plan. Thank you Daren Cook MD for the opportunity to be involved in this patient's care.       Signed:    Electronically signed by Daren Cook MD on 2/2/23 at 3:32 PM EST

## 2023-02-02 NOTE — CARE COORDINATION
2/2/23, 2:58 PM EST    DISCHARGE ON GOING EVALUATION    Adah Last day: 2  [facility] Reason for admit: Chest pain [R07.9]  Chest pain, unspecified type [R07.9]   Procedure: 2-2-23 Cardiac Cath  Barriers to Discharge: Cardiac Cath today with no intervention. IVF at 75/hr. PO Keflex. Q 8 hrs. PCP: Driss Leblanc MD  Readmission Risk Score: 17.8%  Patient Goals/Plan/Treatment Preferences: Return home where he resides with house mates. Has DME.     2/2/23, 3:05 PM EST    Patient goals/plan/ treatment preferences discussed by  and . Patient goals/plan/ treatment preferences reviewed with patient/ family. Patient/ family verbalize understanding of discharge plan and are in agreement with goal/plan/treatment preferences. Understanding was demonstrated using the teach back method. AVS provided by RN at time of discharge, which includes all necessary medical information pertaining to the patients current course of illness, treatment, post-discharge goals of care, and treatment preferences. Services At/After Discharge: None       IMM Letter  IMM Letter given to Patient/Family/Significant other/Guardian/POA/by[de-identified] Pt Access  IMM Letter date given[de-identified] 01/31/23  IMM Letter time given[de-identified] 2120    Potential discharge home in next 24 hours. No discharge needs anticipated.

## 2023-02-02 NOTE — PROCEDURES
[address] [place] [address]                            CARDIAC CATHETERIZATION    PATIENT NAME: Aj Natarajan                     :        1970  MED REC NO:   [id number]                           ROOM:       0028  ACCOUNT NO:   [de-identified]                           ADMIT DATE: 2023  PROVIDER:     Lizzy Maddox MD    DATE OF PROCEDURE:  2023    INDICATION:  Unstable angina. DESCRIPTION OF PROCEDURE:  After informed consent was obtained from the  patient, he was brought to the cardiac catheterization laboratory and  prepped in sterile fashion. Right radial artery was chosen as the  primary point of access. Preprocedure timeout was completed. After  infiltration of the right wrist with 2% lidocaine using micropuncture  and modified Seldinger technique under fluoroscopic guidance and  ultrasound guidance, I was not able to insert a 6-Gambian Slender sheath into  the right radial artery. I had to abort to right femoral artery due to what appears to  be right radial artery occlusion. I then performed diagnostic coronary  angiography using 5-Gambian JL4 and 5-Gambian JR4 catheters. CORONARY ANGIOGRAM:  LEFT MAIN:  Patent without any significant obstruction. LAD:  Patent without any significant obstruction. Small diagonal  branches without any significant obstruction. LCX:  No significant obstruction. It gives rise to a OM1 and OM2 system  without any significant obstruction. Of note, he has NANCI-2 flow that  improves with nitroglycerin. RCA:  RCA is patent. Previously placed stent, has no significant  obstruction, bifurcates into the PDA and the PLB, both without any  significant obstruction. Again, there is NANCI-2 flow at baseline. LV:  LV systolic pressure is 633. No significant LV to AO systolic  gradient. LVEDP is 20. IMMEDIATE COMPLICATIONS:  None. MEDICATIONS:  See EMR.     ACCESS: Vascade was used for hemostasis. ESTIMATED BLOOD LOSS:  Less than 50 mL. SUMMARY:  No significant epicardial coronary artery disease; patent RCA  stent; likely microvascular dysfunction; preserved LVEF. PLAN:  1. Bedrest.  2.  Optimal medical therapy. 3.  Risk factor management. 4.  Routine access site care. 5.  Uptitrate antianginals. 6.  If he is maximally tolerating antianginal therapy and still having  discomfort, we will search for alternative causes of the patient's chest  pain. All the above were explained to the patient and the patient's family. They are agreeable and amenable to the above plan.         Wendy Starkey MD    D: 02/02/2023 10:03:44       T: 02/02/2023 12:11:12     [email or web address]  Job#: [id number]     Doc#: [id number]    CC:

## 2023-02-02 NOTE — PLAN OF CARE
Problem: Discharge Planning  Goal: Discharge to home or other facility with appropriate resources  2/1/2023 2024 by Lawyer Bernabe RN  Outcome: Progressing  2/1/2023 1847 by Hossein Beltran RN  Outcome: Progressing  Flowsheets (Taken 2/1/2023 0800)  Discharge to home or other facility with appropriate resources: Identify barriers to discharge with patient and caregiver     Problem: Pain  Goal: Verbalizes/displays adequate comfort level or baseline comfort level  2/1/2023 2024 by Lawyer Bernabe RN  Outcome: Progressing  2/1/2023 1847 by Hossein Beltran RN  Outcome: Progressing     Problem: Safety - Adult  Goal: Free from fall injury  2/1/2023 2024 by Lawyer Bernabe RN  Outcome: Progressing  2/1/2023 1847 by Hossein Beltran RN  Outcome: Progressing  Flowsheets (Taken 2/1/2023 1345)  Free From Fall Injury: Instruct family/caregiver on patient safety     Problem: ABCDS Injury Assessment  Goal: Absence of physical injury  2/1/2023 2024 by Lawyer Bernabe RN  Outcome: Progressing  2/1/2023 1847 by Hossein Beltran RN  Outcome: Progressing  Flowsheets (Taken 2/1/2023 1345)  Absence of Physical Injury: Implement safety measures based on patient assessment

## 2023-02-02 NOTE — DISCHARGE INSTRUCTIONS
Follow with Dr. Corbin Howell 2-3 weeks -- noncardiac CP - likely MURALI   Follow with Pulmonary for MURALI - doesn't have PAP    Discharge instructions for Cardiac Cath Groin Approach  1. Take it easy for 3-4 days. 2.  No driving for 2 days. 3.  No lifting of 5 lbs or more for 5 days. - this is a gallon of milk. 4.  Can shower after 24 hours. 5.  Remove dressing in shower after 24 hours. 6.  Apply a clean band aid for 5 days over the incision with antibiotic ointment to site, then leave open. 7.  No creams, ointments, or powders near site. 8.  No bath tubs, swimming, or hot tubs for one week. 9.  Take stairs with unaffected side first.  10.  Watch for signs of infection - redness, increased pain, elevated temperature. 11.  If bleeding from incision, apply pressure and call 911. Groin Care Instructions        Normal Observation: You may or may not experience these. Soreness or tenderness that may last a few weeks. Possible bruising that could last a few weeks and up to one month. Formation of a small lump (dime to quarter size) that should last only a few weeks. Care of your incision  You may shower 24 hours after the procedure. Wet the dressing thoroughly and gently remove the bandage from the hospital during showering. It is easier to remove this way. Gently clean your site daily using soap and water while standing in the shower. Dry thoroughly. Do not apply powders or lotions to the site for 2 weeks. Keep the site clean and dry to prevent infection. Do not sit in a bathtub or a pool of water for 7 days. Inspect the site daily. Activity   You may resume normal activity in 2 days, including driving, letting pain be your     guide. Limit lifting over 5 pounds (half gallon of milk) to one week or until site heals. Limit vigorous activity (contact sports) to two weeks time. You will be able to return to work in 1-3 days.     Call our office immediately if you experience any of the following  Significant bleeding does not stop after 10 minutes of applying firm pressure directly over incision. Increased swelling of groin or leg. Unusual pain at groin or down that leg. Signs of infection: redness, warmth to touch, drainage, poorly healing incision, fever, or chills.

## 2023-02-02 NOTE — BRIEF OP NOTE
[facility]  Sedation/Analgesia Post Sedation Record    Pt Name: Mac Michele  Account number: [de-identified]  MRN: [id number]  YOB: 1970  Procedure Performed By: MD MD Nasir Cole, [address]  Primary Care Physician: Matheus Hernandez MD  Date: 2/2/2023    POST-PROCEDURE    Physicians/Assistants: MD MD Nasir Cole, TONA    Procedure Performed:Cath      Sedation/Anesthesia: Versed/ Fentanyl and 2% xylocaine local anesthesia. Estimated Blood Loss: < 50 ml. Specimens Removed: None         Disposition of Specimen: N/A        Complications: No Immediate Complications.        Post-procedure Diagnosis/Findings:    No sig CAD                MD MD Nasir Cole, TONA  Electronically signed 2/2/2023 at 7:30 AM  Interventional Cardiology

## 2023-02-02 NOTE — PROGRESS NOTES
Cardiology Progress Note      Patient:  Vianca Mooney  YOB: 1970  MRN: [id number]   Acct: [de-identified]  [facility] Date:  1/31/2023  Primary Cardiologist: Romana Dudley MD  Seen by Dr. Jeffy Iniguez     Per prior cardiology consult note-  REASON FOR CONSULT:    Chest pain is similar in nature to prior heart attack, heart score of 5      CHIEF COMPLAINT:    Chest pain      HISTORY OF PRESENT ILLNESS:    Vianca Mooney is a pleasant 52year old male patient with past medical history that includes:   Past Medical History        Past Medical History:   Diagnosis Date    Atherosclerotic heart disease of native coronary artery with unspecified angina pectoris 1/18/2022    Bipolar 2 disorder ([facility]       previously followed with Dr. Jamari Jones and Nancy Leija in [facility]    BPH (benign prostatic hyperplasia)      COPD (chronic obstructive pulmonary disease) ([address]      Diabetes mellitus type 2, uncontrolled       HgbA1c on 4/2/2019 was 9.1.     Diabetic peripheral neuropathy (HCC)      Diabetic polyneuropathy ([address]      Diabetic ulcer of right foot associated with type 2 diabetes mellitus ([address] 12/10/2015    Essential hypertension       \"never been on b/p medication that I know of\"    GERD (gastroesophageal reflux disease)      Hammer toe of left foot      Heart murmur       denies any chest pain or palpitations    History of tobacco abuse      Hx of AKA (above knee amputation), right ([address] 04/18/2019     Dr. Cheung Factor    Hyperlipidemia      Macular edema, diabetic, bilateral ([address] 05/04/2018     Dr. 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 He reports being \"usually compliant\" with his medications, but admits to missing his medications on one day last week. Echocardiogram on 11/2022 revealed an EF of 55%. Patient has h/o PAD, he is s/p right AKA. The patient was admitted to the hospital on 1/31/2023 after he presented to [facility] ER with chest pains. For the past few days, the patient has been experiencing recurrent episodes of chest pains, left sided, radiating to left arm on occasions with left arm numbness, without significant associated SOB. Chest pain became more severe, more constant, patient decided to present to ER. At this time, patient reports having some left sided chest pain. He is concerned that chest pain might be similar to what he had with prior MI. Troponin <0.01, <0.01, <0.01. EKG revealed SR with 1st degree AV block. CXR revealed no acute cardiopulmonary process.         Subjective (Events in last 24 hours):   Pt sitting in chair   Co palpitations -- nothing found on tele     Pt denies CP or pressure today   VSS    Tele SR no ectopy     Pt states has MURALI and doesn't know why he doesn't have PAP - in prior notes - it stated that he refused it     Cath site WNL     Objective:   BP (!) 96/54   Pulse 94   Temp 98.2 °F (36.8 °C) (Oral)   Resp 16   Ht 5' 6\" (1.676 m)   Wt 225 lb (102.1 kg)   SpO2 99%   BMI 36.32 kg/m²        TELEMETRY: SR no ectopy     Physical Exam:  General Appearance: alert and oriented to person, place and time, in no acute distress  Cardiovascular: normal rate, regular rhythm, normal S1 and S2, no murmurs, rubs, clicks, or gallops, distal pulses intact,   Pulmonary/Chest: clear to auscultation bilaterally- no wheezes, rales or rhonchi, normal air movement, no respiratory distress  Abdomen: soft, non-tender, non-distended, normal bowel sounds, no masses Extremities: no cyanosis, clubbing or edema, pulses present    Skin: warm and dry, no rash or erythema  Musculoskeletal: normal range of motion, no joint swelling, deformity or tenderness  Neurological: alert, oriented, normal speech, no focal findings or movement disorder noted    Medications:    pantoprazole  40 mg Oral QAM AC    cephALEXin  500 mg Oral 3 times per day    insulin lispro  20 Units SubCUTAneous TID WC    sodium chloride flush  5-40 mL IntraVENous 2 times per day    sodium chloride flush  5-40 mL IntraVENous 2 times per day    aspirin  81 mg Oral Daily    atorvastatin  80 mg Oral Nightly    heparin (porcine)  5,000 Units SubCUTAneous 3 times per day    ARIPiprazole  5 mg Oral Daily    ascorbic acid  500 mg Oral Daily    Vitamin D  1,000 Units Oral Daily    [Held by provider] furosemide  20 mg Oral Daily    gabapentin  600 mg Oral TID    levothyroxine  50 mcg Oral Daily    [Held by provider] lisinopril  10 mg Oral Daily    metoprolol tartrate  50 mg Oral BID    multivitamin  1 tablet Oral Daily    sertraline  100 mg Oral Daily    ticagrelor  90 mg Oral BID    zinc sulfate  50 mg Oral Daily    insulin glargine  50 Units SubCUTAneous BID      sodium chloride      nitroGLYCERIN 10 mcg/min (02/02/23 0427)    sodium chloride      dextrose       sodium chloride flush, 5-40 mL, PRN  sodium chloride, , PRN  nitroGLYCERIN, 0.4 mg, Q5 Min PRN  sodium chloride flush, 5-40 mL, PRN  sodium chloride, , PRN  ondansetron, 4 mg, Q8H PRN   Or  ondansetron, 4 mg, Q6H PRN  acetaminophen, 650 mg, Q6H PRN   Or  acetaminophen, 650 mg, Q6H PRN  polyethylene glycol, 17 g, Daily PRN  glucose, 4 tablet, PRN  dextrose bolus, 125 mL, PRN   Or  dextrose bolus, 250 mL, PRN  glucagon (rDNA), 1 mg, PRN  dextrose, , Continuous PRN  morphine, 2 mg, Q4H PRN   Or  morphine, 4 mg, Q4H PRN        Diagnostics:    Echo:    Electronically signed by Corey Maya MD (Interpreting   physician) on 02/01/2023 at 05:27 PM   ----------------------------------------------------------------      Findings      Mitral Valve   Structurally normal mitral valve. Aortic Valve   Structurally normal aortic valve. Tricuspid Valve   Tricuspid valve is structurally normal.      Pulmonic Valve   Pulmonic valve is structurally normal.      Left Atrium   Normal size left atrium. Left Ventricle   Mild concentric left ventricular hypertrophy. Left ventricle size is normal.   There were no regional wall motion abnormalities. Ejection fraction is visually estimated at 55%. Right Atrium   The right atrium is of normal size. Right Ventricle   Normal right ventricular size and function. Pericardial Effusion   No evidence of any pericardial effusion. Pleural Effusion   No evidence of pleural effusion. Aorta / Great Vessels   -Aortic root dimension within normal limits.   -The Pulmonary artery is within normal limits. -IVC size is within normal limits with normal respiratory phasic changes. Left Heart Cath:   Post-procedure Diagnosis/Findings:    No sig CAD                                                                                                 MD MD Juany Knowles, TONA  Electronically signed 2/2/2023 at 7:30 AM  Interventional Cardiology    Lab Data:    Cardiac Enzymes:  No results for input(s): CKTOTAL, CKMB, CKMBINDEX, TROPONINI in the last 72 hours.     CBC:   Lab Results   Component Value Date/Time    WBC 9.3 02/02/2023 07:44 AM    RBC 4.42 02/02/2023 07:44 AM    HGB 14.2 02/02/2023 07:44 AM    HCT 41.6 02/02/2023 07:44 AM     02/02/2023 07:44 AM       CMP:    Lab Results   Component Value Date/Time     02/02/2023 07:44 AM    K 4.2 02/02/2023 07:44 AM    K 4.0 11/23/2022 03:53 PM     02/02/2023 07:44 AM    CO2 18 02/02/2023 07:44 AM    BUN 18 02/02/2023 07:44 AM    CREATININE 0.8 02/02/2023 07:44 AM    GFRAA >60 08/24/2016 05:54 PM    LABGLOM >60 02/02/2023 07:44 AM    GLUCOSE 151 02/02/2023 07:44 AM    GLUCOSE 114 12/26/2020 09:39 AM    CALCIUM 8.1 02/02/2023 07:44 AM Hepatic Function Panel:    Lab Results   Component Value Date/Time    ALKPHOS 68 11/24/2022 04:51 AM    ALT 20 11/24/2022 04:51 AM    AST 27 11/24/2022 04:51 AM    PROT 6.1 11/24/2022 04:51 AM    BILITOT 0.4 11/24/2022 04:51 AM    BILIDIR <0.2 07/14/2022 02:30 PM    IBILI 0.1 03/02/2015 11:30 AM    LABALBU 3.6 11/24/2022 04:51 AM       Magnesium:    Lab Results   Component Value Date/Time    MG 2.2 11/23/2022 09:00 AM       PT/INR:    Lab Results   Component Value Date/Time    PROTIME 12.0 12/26/2020 09:39 AM    PROTIME 12.2 01/03/2019 12:00 AM    INR 1.04 11/23/2022 10:41 AM       HgBA1c:    Lab Results   Component Value Date/Time    LABA1C 6.5 12/19/2022 10:39 AM    LABA1C 7.8 09/15/2022 09:06 AM       FLP:    Lab Results   Component Value Date/Time    TRIG 192 10/29/2021 04:30 PM    HDL 37 10/29/2021 04:30 PM    LDLCALC 92 10/29/2021 04:30 PM    LDLDIRECT 102.12 02/26/2021 10:53 AM       TSH:    Lab Results   Component Value Date/Time    TSH 2.480 11/23/2022 09:00 AM         Assessment:    Chest pain / possible [place] / palpitations  - negative trop / EKG     Likely untreated MURALI as CP cause - re-send to pulmonary as OP   Per prior notes - doesn't want PAP      Sp cardiac cath 2/1/23:  No significant CAD found     Preserved EF    DM II  HTN--- marginal low BP   HLP   LDL 92   12/2022      Hx PVD- sp AKA    Hx NSTEMI with PCI to RCA 11/2022      Plan:  Likely untreated MURALI is the cause of the pts symptoms - he needs to try the PAP   Up-titrate BB as we can  14 day event for co palpitations   Ok for DC from cardiology stand point           Electronically signed by MARILYN Kerr CNP on 2/2/2023 at 10:38 AM

## 2023-02-03 ENCOUNTER — CARE COORDINATION (OUTPATIENT)
Dept: CASE MANAGEMENT | Age: 53
End: 2023-02-03

## 2023-02-03 NOTE — CARE COORDINATION
Care Transitions Initial Outreach Attempt-1st attempt    Call within 2 business days of discharge: Yes   Attempted initial 24 hour transitional call to patient. Left VM to return call directly to [phone]. Patient: Eneida Valverde Patient : 1970 MRN: [id number]    Last Discharge  Street       Date Complaint Diagnosis Description Type Department Provider    23 Chest Pain Chest pain, unspecified type . .. ED to Hosp-Admission (Discharged) (ADMITTED) Jamie Gaytan MD; Amanda Johnson MD;...             Noted following upcoming appointments from discharge chart review:   [facility] follow up appointment(s):   Future Appointments   Date Time Provider Ebenezer Carmichael   2023  1:00 PM Samara Guzmán MD [facility]   3/1/2023  8:00 AM [facility] [address]   3/21/2023 11:00 AM Stacy Avila RN [facility] Physic [address]   2023  1:45 PM E[facility] Gillis [facility] [address]     [id number] Myrna Taveras follow up appointment(s): Elliott Cortez 2/3

## 2023-02-06 ENCOUNTER — CARE COORDINATION (OUTPATIENT)
Dept: CASE MANAGEMENT | Age: 53
End: 2023-02-06

## 2023-02-06 DIAGNOSIS — R07.9 CHEST PAIN, UNSPECIFIED TYPE: Primary | ICD-10-CM

## 2023-02-06 PROCEDURE — 1111F DSCHRG MED/CURRENT MED MERGE: CPT | Performed by: INTERNAL MEDICINE

## 2023-02-06 NOTE — CARE COORDINATION
[facility] Care Transitions Initial Follow Up Call    Call within 2 business days of discharge: Yes    Care Transition Nurse contacted the patient by telephone to perform post hospital discharge assessment. Verified name and  with patient as identifiers. Provided introduction to self, and explanation of the Care Transition Nurse role. Patient: Cherie Sandoval Patient : 1970   MRN: [id number]  Reason for Admission: Chest pain  Discharge Date: 23 RARS: Readmission Risk Score: 18.2      Last Discharge [address]       Date Complaint Diagnosis Description Type Department Provider    23 Chest Pain Chest pain, unspecified type . .. ED to Hosp-Admission (Discharged) (ADMITTED) Vanita Ambrose MD; Kashmir Renee MD;... Challenges to be reviewed by the provider   Additional needs identified to be addressed with provider: Yes  Akila Elise is still having chest pain at times, radiating to left arm, little SOB. According to [address] note , he was to get a 14 day event monitor but didn't get at discharge. Heart cath didn't indicate sig CAD. Please call him to advise. Sees PCP Wed and Dr Chanell Garza 3/1. Method of communication with provider: chart routing. Spoke with Akila Elise, said he still has chest pain at times, radiates to left arm and a little SOB. Has not notified PCP and/or cardio. Was supposed to get a 14 day event monitor at discharge but did not. Will send message to cardio. Had a heart cath while inpatient, with no sig CAD found. Groin site is without s/s of infection. Reviewed medications/changes. FBS have been stable. Appetite and fluid intake is good. Denies fever, chills, n/v/d. Will see provider at PCP office on Wed. Has transportation. Denies any needs. No other questions or concerns at this time. Will continue to follow. Instructed to go to ED/UC if chest pain continues.     Care Transition Nurse reviewed discharge instructions, medical action plan, and red flags with patient who verbalized understanding. The patient was given an opportunity to ask questions and does not have any further questions or concerns at this time. Were discharge instructions available to patient? Yes. Reviewed appropriate site of care based on symptoms and resources available to patient including: PCP  Specialist  Urgent care clinics  When to call [address] The patient agrees to contact the PCP office for questions related to their healthcare. Advance Care Planning:   Does patient have an Advance Directive: reviewed and current. Medication reconciliation was performed with patient, who verbalizes understanding of administration of home medications.  Medications reviewed, 1111F entered: yes    Was patient discharged with a pulse oximeter? no    Non-face-to-face services provided:  Scheduled appointment with PCP-2/8  Scheduled appointment with Specialist-3/1 3/21  Obtained and reviewed discharge summary and/or continuity of care documents    Offered patient enrollment in the Remote Patient Monitoring (RPM) program for in-home monitoring: NA.    Care Transitions 24 Hour Call    Schedule Follow Up Appointment with PCP: Completed  Do you have a copy of your discharge instructions?: Yes  Do you have all of your prescriptions and are they filled?: Yes  Have you been contacted by a [facility] Pharmacist?: No  Have you scheduled your follow up appointment?: Yes  How are you going to get to your appointment?: Public transportation  [address] (Comment: [facility])  Patient DME: [employer] chair, Walker, Wheelchair, Other  Other Patient DME: Entrance Ramp  Do you have support at home?: Other Caregiver  Do you feel like you have everything you need to keep you well at home?: Yes  Are you an active caregiver in your home?: No  Care Transitions Interventions     Transportation Support: Declined            Follow Up  Future Appointments   Date Time Provider Ebenezer Carmichael 2/8/2023  1:00 PM Marli Dowling MD [facility]   3/1/2023  8:00 AM Abdias Regan MD N [facility] [address]   3/21/2023 11:00 AM Katina Garnett RN [facility] Physic [address]   4/20/2023  1:45 PM Quinton Gilliland MD [address]  Transition Nurse provided contact information. Plan for follow-up call in 1-2 days based on severity of symptoms and risk factors. Plan for next call: symptom management-chest pain  Cardio call?     Niko Wu RN

## 2023-02-08 ENCOUNTER — OFFICE VISIT (OUTPATIENT)
Dept: INTERNAL MEDICINE CLINIC | Age: 53
End: 2023-02-08
Payer: MEDICARE

## 2023-02-08 ENCOUNTER — CARE COORDINATION (OUTPATIENT)
Dept: CASE MANAGEMENT | Age: 53
End: 2023-02-08

## 2023-02-08 VITALS — SYSTOLIC BLOOD PRESSURE: 140 MMHG | HEART RATE: 96 BPM | DIASTOLIC BLOOD PRESSURE: 60 MMHG

## 2023-02-08 DIAGNOSIS — I20.9 ANGINA PECTORIS, UNSPECIFIED (HCC): ICD-10-CM

## 2023-02-08 DIAGNOSIS — G62.9 NEUROPATHY: ICD-10-CM

## 2023-02-08 DIAGNOSIS — E66.01 SEVERE OBESITY (BMI 35.0-39.9) WITH COMORBIDITY (HCC): ICD-10-CM

## 2023-02-08 DIAGNOSIS — Z79.4 TYPE 2 DIABETES MELLITUS WITH HYPERGLYCEMIA, WITH LONG-TERM CURRENT USE OF INSULIN (HCC): Primary | ICD-10-CM

## 2023-02-08 DIAGNOSIS — E11.65 TYPE 2 DIABETES MELLITUS WITH HYPERGLYCEMIA, WITH LONG-TERM CURRENT USE OF INSULIN (HCC): Primary | ICD-10-CM

## 2023-02-08 PROCEDURE — 1111F DSCHRG MED/CURRENT MED MERGE: CPT | Performed by: STUDENT IN AN ORGANIZED HEALTH CARE EDUCATION/TRAINING PROGRAM

## 2023-02-08 PROCEDURE — 99213 OFFICE O/P EST LOW 20 MIN: CPT | Performed by: STUDENT IN AN ORGANIZED HEALTH CARE EDUCATION/TRAINING PROGRAM

## 2023-02-08 PROCEDURE — 3074F SYST BP LT 130 MM HG: CPT | Performed by: STUDENT IN AN ORGANIZED HEALTH CARE EDUCATION/TRAINING PROGRAM

## 2023-02-08 PROCEDURE — 2022F DILAT RTA XM EVC RTNOPTHY: CPT | Performed by: STUDENT IN AN ORGANIZED HEALTH CARE EDUCATION/TRAINING PROGRAM

## 2023-02-08 PROCEDURE — 1036F TOBACCO NON-USER: CPT | Performed by: STUDENT IN AN ORGANIZED HEALTH CARE EDUCATION/TRAINING PROGRAM

## 2023-02-08 PROCEDURE — G8484 FLU IMMUNIZE NO ADMIN: HCPCS | Performed by: STUDENT IN AN ORGANIZED HEALTH CARE EDUCATION/TRAINING PROGRAM

## 2023-02-08 PROCEDURE — G8427 DOCREV CUR MEDS BY ELIG CLIN: HCPCS | Performed by: STUDENT IN AN ORGANIZED HEALTH CARE EDUCATION/TRAINING PROGRAM

## 2023-02-08 PROCEDURE — 3017F COLORECTAL CA SCREEN DOC REV: CPT | Performed by: STUDENT IN AN ORGANIZED HEALTH CARE EDUCATION/TRAINING PROGRAM

## 2023-02-08 PROCEDURE — 3078F DIAST BP <80 MM HG: CPT | Performed by: STUDENT IN AN ORGANIZED HEALTH CARE EDUCATION/TRAINING PROGRAM

## 2023-02-08 PROCEDURE — G8417 CALC BMI ABV UP PARAM F/U: HCPCS | Performed by: STUDENT IN AN ORGANIZED HEALTH CARE EDUCATION/TRAINING PROGRAM

## 2023-02-08 PROCEDURE — 3046F HEMOGLOBIN A1C LEVEL >9.0%: CPT | Performed by: STUDENT IN AN ORGANIZED HEALTH CARE EDUCATION/TRAINING PROGRAM

## 2023-02-08 RX ORDER — GABAPENTIN 800 MG/1
800 TABLET ORAL 3 TIMES DAILY
Qty: 90 TABLET | Refills: 2 | Status: SHIPPED | OUTPATIENT
Start: 2023-02-08 | End: 2023-03-10

## 2023-02-08 RX ORDER — ISOSORBIDE MONONITRATE 30 MG/1
30 TABLET, EXTENDED RELEASE ORAL DAILY
Qty: 30 TABLET | Refills: 3 | Status: SHIPPED | OUTPATIENT
Start: 2023-02-08

## 2023-02-08 SDOH — ECONOMIC STABILITY: HOUSING INSECURITY
IN THE LAST 12 MONTHS, WAS THERE A TIME WHEN YOU DID NOT HAVE A STEADY PLACE TO SLEEP OR SLEPT IN A SHELTER (INCLUDING NOW)?: NO

## 2023-02-08 SDOH — ECONOMIC STABILITY: FOOD INSECURITY: WITHIN THE PAST 12 MONTHS, THE FOOD YOU BOUGHT JUST DIDN'T LAST AND YOU DIDN'T HAVE MONEY TO GET MORE.: OFTEN TRUE

## 2023-02-08 SDOH — ECONOMIC STABILITY: FOOD INSECURITY: WITHIN THE PAST 12 MONTHS, YOU WORRIED THAT YOUR FOOD WOULD RUN OUT BEFORE YOU GOT MONEY TO BUY MORE.: NEVER TRUE

## 2023-02-08 SDOH — ECONOMIC STABILITY: INCOME INSECURITY: HOW HARD IS IT FOR YOU TO PAY FOR THE VERY BASICS LIKE FOOD, HOUSING, MEDICAL CARE, AND HEATING?: SOMEWHAT HARD

## 2023-02-08 ASSESSMENT — PATIENT HEALTH QUESTIONNAIRE - PHQ9
9. THOUGHTS THAT YOU WOULD BE BETTER OFF DEAD, OR OF HURTING YOURSELF: 0
2. FEELING DOWN, DEPRESSED OR HOPELESS: 0
SUM OF ALL RESPONSES TO PHQ QUESTIONS 1-9: 0
4. FEELING TIRED OR HAVING LITTLE ENERGY: 0
1. LITTLE INTEREST OR PLEASURE IN DOING THINGS: 0
7. TROUBLE CONCENTRATING ON THINGS, SUCH AS READING THE NEWSPAPER OR WATCHING TELEVISION: 0
SUM OF ALL RESPONSES TO PHQ QUESTIONS 1-9: 0
SUM OF ALL RESPONSES TO PHQ QUESTIONS 1-9: 0
SUM OF ALL RESPONSES TO PHQ9 QUESTIONS 1 & 2: 0
6. FEELING BAD ABOUT YOURSELF - OR THAT YOU ARE A FAILURE OR HAVE LET YOURSELF OR YOUR FAMILY DOWN: 0
5. POOR APPETITE OR OVEREATING: 0
3. TROUBLE FALLING OR STAYING ASLEEP: 0
8. MOVING OR SPEAKING SO SLOWLY THAT OTHER PEOPLE COULD HAVE NOTICED. OR THE OPPOSITE, BEING SO FIGETY OR RESTLESS THAT YOU HAVE BEEN MOVING AROUND A LOT MORE THAN USUAL: 0
SUM OF ALL RESPONSES TO PHQ QUESTIONS 1-9: 0

## 2023-02-08 NOTE — PROGRESS NOTES
Post-Discharge Transitional Care  Follow Up      Kerry Guerra   YOB: 1970    Date of Office Visit:  2/8/2023  Date of Hospital Admission: 1/31/23  Date of Hospital Discharge: 2/2/23  Risk of hospital readmission (high >=14%. Medium >=10%) :Readmission Risk Score: 18.2      Care management risk score Rising risk (score 2-5) and Complex Care (Scores >=6): No Risk Score On File     Non face to face  following discharge, date last encounter closed (first attempt may have been earlier): 02/06/2023    Call initiated 2 business days of discharge: Yes    ASSESSMENT/PLAN:   Type 2 diabetes mellitus with hyperglycemia, with long-term current use of insulin ([address]  -     AFL (Epic) - Maria Esther Gallardo MD, Endocrinology, [facility] [employer]  Severe obesity (BMI 35.0-39. 9) with comorbidity (HCC)  Angina pectoris, unspecified ([address]  -     isosorbide mononitrate (IMDUR) 30 MG extended release tablet; Take 1 tablet by mouth daily, Disp-30 tablet, R-3Normal  Neuropathy  -     gabapentin (NEURONTIN) 800 MG tablet; Take 1 tablet by mouth 3 times daily for 90 doses. , Disp-90 tablet, R-2Normal    Medical Decision Making: moderate complexity  No follow-ups on file. On this date 2/8/2023 I have spent 30 minutes reviewing previous notes, test results and face to face with the patient discussing the diagnosis and importance of compliance with the treatment plan as well as documenting on the day of the visit. Subjective:   HPI:  Follow up of Hospital problems/diagnosis(es):    Diagnosis Orders   1. Type 2 diabetes mellitus with hyperglycemia, with long-term current use of insulin (HCC)  AFL (Epic) - Maria Esther Gallardo MD, Endocrinology, [facility] ZACH MENDES [email or web address]VI[employer]ASHIR[employer]      2. Severe obesity (BMI 35.0-39. 9) with comorbidity ([address]        3. Angina pectoris, unspecified ([address]  isosorbide mononitrate (IMDUR) 30 MG extended release tablet      4.  Neuropathy  gabapentin (NEURONTIN) 800 MG tablet            Inpatient course: Discharge summary reviewed- see chart.    Interval history/Current status:   Mr Brarett Calhoun is a 51 yo male who was recently discharged from [facility] on 02/02/2023. He was admitted for chest pain, concerning for ACS. Heart score of 5. He underwent LHC which demonstrated no significant CAD. He states that he continues to have intermittent sharp pain across his chest, radiating down his left arm. No relation to rest or exercise. He also complains of occasional shortness of breath. This also has no relation to rest or exercise. Additionally, patient will occasionally take an extra pill of gabapentin daily for his neuropathic pain. Assessment/Plan:  Type 2 DM: Will decrease Toujeo to 80 units twice daily to avoid overnight hypoglycemia. Angina pectoris: We will add Imdur and assess response. Neuropathy: We will increase gabapentin to 800 mg 3 times daily    Patient Active Problem List   Diagnosis    Status post below knee amputation of right lower extremity (HCC)    Gait disturbance    Sebaceous cyst    Uncontrolled type 2 diabetes mellitus with hyperglycemia, with long-term current use of insulin ([facility])    Severe bipolar II disorder, recent episode major depressive, remission ([facility])    Bipolar 1 disorder ([facility])    Leukocytosis    Lactic acidosis    Hyponatremia    Normocytic anemia    Obesity (BMI 30-39. 9)    History of noncompliance with medical treatment    Essential hypertension    Tobacco dependence    Gastroesophageal reflux disease without esophagitis    History of marijuana use    Physical debility    Anemia    Electrolyte imbalance    Type 2 diabetes mellitus with hyperglycemia, with long-term current use of insulin ([facility])    Weakness    Infection of above knee amputation stump of right leg (HCC)    Above knee amputation of right lower extremity (HCC)    Vitamin D deficiency    COPD exacerbation (HCC)    Influenza B    Depression, recurrent (HCC)    Major depressive disorder, recurrent ([facility])    GI bleed    Elevated lipase    Other psychoactive substance abuse, uncomplicated [facility]    Calculus of gallbladder without cholecystitis without obstruction    Right upper quadrant abdominal pain    Pedal edema    MURALI (obstructive sleep apnea)    Shortness of breath    Adult hypothyroidism    Anxiety and depression    Dyspnea    Sinus tachycardia    Metabolic acidosis    Edema of left lower extremity    SOB (shortness of breath) on exertion    Intermittent palpitations    History of chest pain    Dizziness, nonspecific    Hyperlipidemia    Neuropathy    NSTEMI (non-ST elevated myocardial infarction) ([facility]    Venous stasis ulcer of left lower leg with edema of left lower leg (HCC)    Angina pectoris, unspecified    Atherosclerotic heart disease of native coronary artery with unspecified angina pectoris    Chronic diarrhea    Lower abdominal pain    Chest pain on exertion    Above knee amputation of left lower extremity ([facility]    COVID-19 virus infection    Chest pain       Medications listed as ordered at the time of discharge from hospital     Medication List            Accurate as of February 8, 2023  2:04 PM. If you have any questions, ask your nurse or doctor. START taking these medications      isosorbide mononitrate 30 MG extended release tablet  Commonly known as: IMDUR  Take 1 tablet by mouth daily            CHANGE how you take these medications      gabapentin 800 MG tablet  Commonly known as: NEURONTIN  Take 1 tablet by mouth 3 times daily for 90 doses.   What changed:   medication strength  how much to take     insulin lispro 200 UNIT/ML Sopn pen  Commonly known as: HUMALOG KWIKPEN U-200  40 units Plus scale before meals; only takes if BG >200 - Max dose 160 units per day  What changed: additional instructions            CONTINUE taking these medications      ARIPiprazole 5 MG tablet  Commonly known as: ABILIFY     ascorbic acid 500 MG tablet  Commonly known as: VITAMIN C  Take 1 tablet by mouth daily     aspirin 81 MG chewable tablet  Take 1 tablet by mouth daily     atorvastatin 80 MG tablet  Commonly known as: LIPITOR  Take 1 tablet by mouth at bedtime     cephALEXin 500 MG capsule  Commonly known as: KEFLEX  Take 1 capsule by mouth every 8 hours for 20 doses     Dexcom G6  Lelia  1 Device by Does not apply route 4 times daily (before meals and nightly)     Dexcom G6 Sensor Misc  1 Device by Does not apply route every 14 days     Dexcom G6 Transmitter Misc  1 Device by Does not apply route every 3 months     empagliflozin 25 MG tablet  Commonly known as: Jardiance  TAKE 1 TABLET BY MOUTH ONCE DAILY. EYE VITAMINS PO     FISH OIL PO     furosemide 20 MG tablet  Commonly known as: LASIX  Take 1 tablet by mouth daily     levothyroxine 50 MCG tablet  Commonly known as: SYNTHROID  TAKE 1 TABLET BY MOUTH ONCE DAILY. lisinopril 5 MG tablet  Commonly known as: PRINIVIL;ZESTRIL  Take 1 tablet by mouth daily Start this med in two days from 2/4/23     metoprolol tartrate 25 MG tablet  Commonly known as: LOPRESSOR  Take 1 tablet by mouth 2 times daily Re start tomorrow evening 2/3     nitroGLYCERIN 0.3 MG SL tablet  Commonly known as: Nitrostat  Place 1 tablet under the tongue every 5 minutes as needed for Chest pain up to max of 3 total doses. If no relief after 1 dose, call 911. polyethylene glycol 17 g packet  Commonly known as: GLYCOLAX  Take 17 g by mouth daily as needed for Constipation     PROBIOTIC PO     sertraline 100 MG tablet  Commonly known as: ZOLOFT     ticagrelor 90 MG Tabs tablet  Commonly known as: BRILINTA  Take 1 tablet by mouth 2 times daily     Toujeo Max SoloStar 300 UNIT/ML Sopn  Generic drug: Insulin Glargine (2 Unit Dial)  INJECT 85 UNITS IN THE MORNING, 80 UNITS IN THE EVENING. EVERY 10 DAYS INCREASE BY 5 UNITS BOTH TIMES, TO GET MORNING GLUCOSE 150-200.      TRUEplus Pen Needles 31G X 8 MM Misc  Generic drug: Insulin Pen Needle  USE AS DIRECTED     Trulicity 4.5 MW/1.8SR Sopn  Generic drug: Dulaglutide  INJECT 4.5 MG INTO THE SKIN ONCE A WEEK . Vitamin D3 25 MCG Tabs  Take 1 tablet by mouth daily     zinc sulfate 220 (50 Zn) MG capsule  Commonly known as: ZINCATE  Take 1 capsule by mouth daily               Where to Get Your Medications        These medications were sent to [facility] [address], [address] [facility]  [address] [place], [place] [address], [address] [address]      Phone: [phone]   gabapentin 800 MG tablet  isosorbide mononitrate 30 MG extended release tablet           Medications marked \"taking\" at this time  Outpatient Medications Marked as Taking for the 2/8/23 encounter (Office Visit) with Yin Wang MD   Medication Sig Dispense Refill    gabapentin (NEURONTIN) 800 MG tablet Take 1 tablet by mouth 3 times daily for 90 doses. 90 tablet 2    isosorbide mononitrate (IMDUR) 30 MG extended release tablet Take 1 tablet by mouth daily 30 tablet 3    furosemide (LASIX) 20 MG tablet Take 1 tablet by mouth daily 60 tablet 3    lisinopril (PRINIVIL;ZESTRIL) 5 MG tablet Take 1 tablet by mouth daily Start this med in two days from 2/4/23 30 tablet 3    metoprolol tartrate (LOPRESSOR) 25 MG tablet Take 1 tablet by mouth 2 times daily Re start tomorrow evening 2/3 60 tablet 3    cephALEXin (KEFLEX) 500 MG capsule Take 1 capsule by mouth every 8 hours for 20 doses 20 capsule 0    polyethylene glycol (GLYCOLAX) 17 g packet Take 17 g by mouth daily as needed for Constipation 527 g 1    Insulin Glargine, 2 Unit Dial, (TOUJEO MAX SOLOSTAR) 300 UNIT/ML SOPN INJECT 85 UNITS IN THE MORNING, 80 UNITS IN THE EVENING. EVERY 10 DAYS INCREASE BY 5 UNITS BOTH TIMES, TO GET MORNING GLUCOSE 150-200. 42 mL 2    levothyroxine (SYNTHROID) 50 MCG tablet TAKE 1 TABLET BY MOUTH ONCE DAILY. 90 tablet 1    Dulaglutide (TRULICITY) 4.5 UZ/4.3HW SOPN INJECT 4.5 MG INTO THE SKIN ONCE A WEEK . 2 mL 5    empagliflozin (JARDIANCE) 25 MG tablet TAKE 1 TABLET BY MOUTH ONCE DAILY.  30 tablet 5 atorvastatin (LIPITOR) 80 MG tablet Take 1 tablet by mouth at bedtime 30 tablet 5    ticagrelor (BRILINTA) 90 MG TABS tablet Take 1 tablet by mouth 2 times daily 60 tablet 5    ascorbic acid (VITAMIN C) 500 MG tablet Take 1 tablet by mouth daily 30 tablet 3    aspirin 81 MG chewable tablet Take 1 tablet by mouth daily 30 tablet 3    zinc sulfate (ZINCATE) 220 (50 Zn) MG capsule Take 1 capsule by mouth daily 30 capsule 3    Cholecalciferol (VITAMIN D) 25 MCG TABS Take 1 tablet by mouth daily 60 tablet 0    insulin lispro (HUMALOG KWIKPEN U-200) 200 UNIT/ML SOPN pen 40 units Plus scale before meals; only takes if BG >200 - Max dose 160 units per day (Patient taking differently: 40 units Plus scale before meals; only takes if BG >200 - Max dose 160 units per day     States not doing sliding scale) 30 Adjustable Dose Pre-filled Pen Syringe 3    Multiple Vitamins-Minerals (EYE VITAMINS PO) Take 1 tablet by mouth daily      Omega-3 Fatty Acids (FISH OIL PO) Take 1 caplet by mouth daily      Probiotic Product (PROBIOTIC PO) Take 1 tablet by mouth daily      Continuous Blood Gluc Sensor (DEXCOM G6 SENSOR) MISC 1 Device by Does not apply route every 14 days 9 each 3    Continuous Blood Gluc Transmit (DEXCOM G6 TRANSMITTER) MISC 1 Device by Does not apply route every 3 months 1 each 3    nitroGLYCERIN (NITROSTAT) 0.3 MG SL tablet Place 1 tablet under the tongue every 5 minutes as needed for Chest pain up to max of 3 total doses.  If no relief after 1 dose, call 911. 30 tablet 3    Insulin Pen Needle (TRUEPLUS PEN NEEDLES) 31G X 8 MM MISC USE AS DIRECTED 200 each 1    sertraline (ZOLOFT) 100 MG tablet Take 100 mg by mouth daily       Continuous Blood Gluc  (DEXCOM G6 ) LIANNE 1 Device by Does not apply route 4 times daily (before meals and nightly) 1 Device 0    ARIPiprazole (ABILIFY) 5 MG tablet Take 5 mg by mouth daily           Medications patient taking as of now reconciled against medications ordered at time of hospital discharge: Yes    A comprehensive review of systems was negative except for what was noted in the HPI. Objective:    BP (!) 140/60 (Site: Left Upper Arm)   Pulse 96   General Appearance: alert and oriented to person, place and time, well developed and well- nourished, in no acute distress  Skin: warm and dry, no rash or erythema  Head: normocephalic and atraumatic  Eyes: pupils equal, round, and reactive to light, extraocular eye movements intact, conjunctivae normal  ENT: tympanic membrane, external ear and ear canal normal bilaterally, nose without deformity, nasal mucosa and turbinates normal without polyps  Neck: supple and non-tender without mass, no thyromegaly or thyroid nodules, no cervical lymphadenopathy  Pulmonary/Chest: clear to auscultation bilaterally- no wheezes, rales or rhonchi, normal air movement, no respiratory distress  Cardiovascular: normal rate, regular rhythm, normal S1 and S2, no murmurs, rubs, clicks, or gallops, distal pulses intact, no carotid bruits  Abdomen: soft, non-tender, non-distended, normal bowel sounds, no masses or organomegaly  Extremities: no cyanosis, clubbing or edema. S/p right AKA. Musculoskeletal: normal range of motion, no joint swelling, deformity or tenderness  Neurologic: reflexes normal and symmetric, no cranial nerve deficit, gait, coordination and speech normal      An electronic signature was used to authenticate this note. --Cha Mcdonald MD  Supervised by Dr. Treasure Engel. Obie [place].  [address]  Dept: [phone]  Dept Fax: [phone] : [phone]

## 2023-02-09 ENCOUNTER — CARE COORDINATION (OUTPATIENT)
Dept: CASE MANAGEMENT | Age: 53
End: 2023-02-09

## 2023-02-09 NOTE — CARE COORDINATION
[address] Care Transitions Follow Up Call    Patient Current Location:  Home: [address] [place] [address]    Care Transition Nurse contacted the patient by telephone to follow up after admission on 23. Verified name and  with patient as identifiers. Patient: Tiny Stephens  Patient : 1970   MRN: [id number]  Reason for Admission: Chest pain  Discharge Date: 23 RARS: Readmission Risk Score: 18.2      Needs to be reviewed by the provider   Additional needs identified to be addressed with provider: No  none             Method of communication with provider: none. Spoke with Rose Marie Dozier. Still has intermittent chest pains. Dr Jose M Ivan responded to my message, to f/u with PCP for non cardiac cause of pain. Saw yesterday, started on Imdur and increased Neurontin. Told him may take a few days to get into his system to help with that pain. He will see Dr Bo Douglass for his DM, BG fluctuating. Eating, drinking fluids and sleeping ok. Denies any needs. No other questions or concerns at this time. Will continue to follow. Addressed changes since last contact:  medications-Imdur, Neurontin  Discussed follow-up appointments. If no appointment was previously scheduled, appointment scheduling offered: Yes. Is follow up appointment scheduled within 7 days of discharge? Yes. Follow Up  Future Appointments   Date Time Provider Ebenezer Carmichael   3/1/2023  8:00 AM MD JULIAN Cheng [facility] [place]   3/8/2023  1:30 PM MD KIA Pendleton [facility] END   3/21/2023 11:00 AM Celina Banks RN [facility] Physic [facility] [place]   2023  1:45 PM Osmany Garcia MD [facility] [place]     Non-[facility] follow up appointment(s): celso    Care Transition Nurse reviewed medical action plan with patient and discussed any barriers to care and/or understanding of plan of care after discharge.  Discussed appropriate site of care based on symptoms and resources available to patient including: PCP  Specialist  Urgent care clinics  When to call [address] The patient agrees to contact the PCP office for questions related to their healthcare. Advance Care Planning:   reviewed and current. Patients top risk factors for readmission: lack of knowledge about disease, medical condition-COPD, DM, HTN, and polypharmacy  Interventions to address risk factors: Scheduled appointment with PCP-4/20 and Scheduled appointment with Specialist-3/1 3/8    Offered patient enrollment in the Remote Patient Monitoring (RPM) program for in-home monitoring: NA.     Care Transitions Subsequent and Final Call    Subsequent and Final Calls  Do you have any ongoing symptoms?: No  Have your medications changed?: Yes  Patient Reports: Imdur started, Neurontin increased  Do you have any questions related to your medications?: No  Do you currently have any active services?: No  Are you currently active with any services?: Home Health  Do you have any needs or concerns that I can assist you with?: No  Identified Barriers: Lack of Education  Care Transitions Interventions     Transportation Support: Declined   Other Interventions:             Care Transition Nurse provided contact information for future needs. Plan for follow-up call in 5-7 days based on severity of symptoms and risk factors. Plan for next call: symptom management-chest pain, DM, COPD, HTN  RPM??     Joanne Guzmán RN

## 2023-02-16 ENCOUNTER — CARE COORDINATION (OUTPATIENT)
Dept: CARE COORDINATION | Age: 53
End: 2023-02-16

## 2023-02-16 ENCOUNTER — CARE COORDINATION (OUTPATIENT)
Dept: CASE MANAGEMENT | Age: 53
End: 2023-02-16

## 2023-02-16 NOTE — CARE COORDINATION
[facility] Care Transitions Follow Up Call    Patient Current Location:  Home: [address] [place] [address]    Care Transition Nurse contacted the patient by telephone to follow up after admission on 23. Verified name and  with patient as identifiers. Patient: Ehsan Dickey  Patient : 1970   MRN: [id number]  Reason for Admission: Chest pain  Discharge Date: 23 RARS: Readmission Risk Score: 18.2      Needs to be reviewed by the provider   Additional needs identified to be addressed with provider: Yes  Angelique said he had a rough night. Had heartburn and left arm pain at rest. Would he benefit from PPI? Please call pt if agree. Method of communication with provider: chart routing. Spoke with Angelique, said he had a \"rough\" night. Had heartburn really bad and left arm pain. Wasn't doing any activity. He is not on PPI, told him I would send a message to PCP if it would be beneficial for him. Other than that, he has been doing ok. BG are better, will see Dr Belinda Ward next month. Eating, drinking, sleeping ok. B&B normal.  Offered RPM, accepted. Denies any other needs. No other questions or concerns at this time. Will continue to follow. Addressed changes since last contact:   heartburn  Discussed follow-up appointments. Follow Up  Future Appointments   Date Time Provider Ebenezer Carmichael   3/1/2023  8:00 AM MD JULIAN Lemus [facility] [address]   3/8/2023  1:30 PM Cresencio Mireles MD [facility] END   3/21/2023 11:00 AM Annabel Goldman RN [facility] [address]   2023  1:45 PM Blaise Darnell MD [facility] [address]     Non-[facility] follow up appointment(s): celso    Care Transition Nurse reviewed medical action plan with patient and discussed any barriers to care and/or understanding of plan of care after discharge.  Discussed appropriate site of care based on symptoms and resources available to patient including: PCP  Specialist  Urgent care clinics  When to call [address] The patient agrees to contact the PCP office for questions related to their healthcare. Advance Care Planning:   reviewed and current. Patients top risk factors for readmission: lack of knowledge about disease, medical condition-COPD, HTN, DM, and polypharmacy  Interventions to address risk factors: Scheduled appointment with PCP-4/20 and Scheduled appointment with Specialist-3/1 3/8 3/21    Offered patient enrollment in the Remote Patient Monitoring (RPM) program for in-home monitoring: Yes, patient enrolled:     Remote Patient Monitoring Enrollment Note      Date/Time: 2/16/2023 11:53 AM    Offered patient enrollment in the [facility] Remote Patient Monitoring (RPM) program for in home monitoring for COPD, Diabetes, and HTN. Patient accepted RPM services. Patient will be monitoring the following daily:  activity level, blood pressure reading, blood pressure heart rate, glucose reading, and pulse ox reading    CTN reviewed the information below with patient:    Emergency Contact (name and contact number): Becca Esquivel (uncle) [phone]    [x] A member from the care coordination team will reach out to notify the patient once the RPM kit is ordered. [x] Once the kit is delivered, the [facility] team will contact the patient after UPS deliver to assist with set up. [x] Determined BP cuff size: large (13.8\"-19.68\")      [] Determined weight scale: na                                             [x] Hours of ACM monitoring - Monday-Friday 7038-1728                     All questions about RPM program answered at this time.   .     Care Transitions Subsequent and Final Call    Subsequent and Final Calls  Do you have any ongoing symptoms?: No  Have your medications changed?: No  Do you have any questions related to your medications?: No  Do you currently have any active services?: No  Are you currently active with any services?: Home Health  Do you have any needs or concerns that I can assist you with?: No  Identified Barriers: Lack of Education  Care Transitions Interventions     Transportation Support: Declined   Other Interventions:             Care Transition Nurse provided contact information for future needs. Plan for follow-up call in 5-7 days based on severity of symptoms and risk factors. Plan for next call: symptom management-new or worsening symptoms  RPM kit received?     Kwadwo Burnham RN

## 2023-02-16 NOTE — CARE COORDINATION
Remote Patient Kit Ordering Note      Date/Time:  2/16/2023 12:44 PM      [x] [employer] confirmed patient shipping address  [x] Patient will receive package over the next 2-4 business days. Someone 21 years or older must be present to sign for UPS delivery. [x] Patient to contact virtual installation-specific phone number listed in the patient instructions. [x] If the patient does not contact HRS within 24 hours, an [email or web address] Ambassador [facility] [place] will call the patient directly: If the patient does not answer, HRS will follow up with the clinical team notifying them about the unsuccessful attempt to contact the patient. HRS will make three call attempts to the patient. [x] LPN will contact patient once equipment is active to welcome them to the program.                                                         [x] Hours of RPM monitoring - Monday-Friday [id number]                     All questions answered at this time. LPN made aware the RPM kit has been ordered. EMTP notified patient of RPM equipment order.

## 2023-02-21 RX ORDER — OMEPRAZOLE 40 MG/1
40 CAPSULE, DELAYED RELEASE ORAL
Qty: 30 CAPSULE | Refills: 2 | OUTPATIENT
Start: 2023-02-21

## 2023-02-21 NOTE — TELEPHONE ENCOUNTER
I called and informed the patient as well. He appreciates the call. I phoned-in this prescription as directed by .

## 2023-02-23 ENCOUNTER — CARE COORDINATION (OUTPATIENT)
Dept: CASE MANAGEMENT | Age: 53
End: 2023-02-23

## 2023-02-23 ENCOUNTER — CARE COORDINATION (OUTPATIENT)
Dept: CARE COORDINATION | Age: 53
End: 2023-02-23

## 2023-02-23 NOTE — CARE COORDINATION
[address] Care Transitions Follow Up Call    Patient Current Location:  Home: [address] [place] [address]    Care Transition Nurse contacted the patient by telephone to follow up after admission on 23. Verified name and  with patient as identifiers. Patient: Gibson Breen  Patient : 1970   MRN: [id number]  Reason for Admission: Chest pain  Discharge Date: 23 RARS: Readmission Risk Score: 18.2      Needs to be reviewed by the provider   Additional needs identified to be addressed with provider: No  none             Method of communication with provider: none. Spoke with Augusto Sever, said he is doing a little better. Still has heartburn but was started on Prilosec and is a little better. BG are ok, will see Dr Jeff Rae 3/8. Has not called instillation for RPM yet but plans to today. Denies any other needs. No other questions or concerns at this time. Will continue to follow. Addressed changes since last contact:  medications-Prilosec  Discussed follow-up appointments. Follow Up  Future Appointments   Date Time Provider Ebenezer Carmichael   3/1/2023  8:00 AM Lauro Hope MD N [facility] [place]   3/8/2023  1:30 PM Dave Mckeon MD [facility]   3/21/2023 11:00 AM Elisha Benz RN [facility] [place]   2023  1:45 PM Norma Vang MD [facility] [place]     Non-[facility] follow up appointment(s): celso    Care Transition Nurse reviewed medical action plan with patient and discussed any barriers to care and/or understanding of plan of care after discharge. Discussed appropriate site of care based on symptoms and resources available to patient including: PCP  Specialist  Urgent care clinics  When to call [address] The patient agrees to contact the PCP office for questions related to their healthcare. Advance Care Planning:   reviewed and current.      Patients top risk factors for readmission: lack of knowledge about disease, medical condition-COPD, HTN, DM, and polypharmacy  Interventions to address risk factors: Scheduled appointment with PCP-4/20 and Scheduled appointment with Specialist-3/1 3/8 3/21    Offered patient enrollment in the Remote Patient Monitoring (RPM) program for in-home monitoring: Yes, patient already enrolled. Care Transitions Subsequent and Final Call    Subsequent and Final Calls  Do you have any ongoing symptoms?: No  Have your medications changed?: Yes  Patient Reports: Jose Aldana started  Do you have any questions related to your medications?: No  Do you currently have any active services?: No  Do you have any needs or concerns that I can assist you with?: No  Identified Barriers: Lack of Education  Care Transitions Interventions     Transportation Support: Declined   Other Interventions:             Care Transition Nurse provided contact information for future needs. Plan for follow-up call in 7-10 days based on severity of symptoms and risk factors.   Plan for next call: symptom management-new or worsening symptoms  follow-up appointment-review f/u 3/1  referral to ambulatory care manager-Agatha ROSENTHAL  Final call    Eda Padilla RN

## 2023-02-23 NOTE — CARE COORDINATION
Paramedic contacted patient in regards to RPM Kit installation. Patient is provided with [address] [place] [address] installation # [phone] and [facility] Support # [phone] and encouraged to reach out as soon as possible to start the program. Patient verbalizes understanding.

## 2023-03-01 ENCOUNTER — OFFICE VISIT (OUTPATIENT)
Dept: CARDIOLOGY CLINIC | Age: 53
End: 2023-03-01
Payer: MEDICARE

## 2023-03-01 VITALS
DIASTOLIC BLOOD PRESSURE: 80 MMHG | SYSTOLIC BLOOD PRESSURE: 138 MMHG | HEART RATE: 94 BPM | HEIGHT: 66 IN | WEIGHT: 225 LBS | BODY MASS INDEX: 36.16 KG/M2

## 2023-03-01 DIAGNOSIS — I25.10 CORONARY ARTERY DISEASE INVOLVING NATIVE CORONARY ARTERY OF NATIVE HEART WITHOUT ANGINA PECTORIS: Primary | ICD-10-CM

## 2023-03-01 DIAGNOSIS — Z95.820 S/P ANGIOPLASTY WITH STENT: ICD-10-CM

## 2023-03-01 DIAGNOSIS — I10 ESSENTIAL HYPERTENSION: ICD-10-CM

## 2023-03-01 DIAGNOSIS — I50.32 CHRONIC DIASTOLIC CONGESTIVE HEART FAILURE (HCC): ICD-10-CM

## 2023-03-01 DIAGNOSIS — E78.00 PURE HYPERCHOLESTEROLEMIA: ICD-10-CM

## 2023-03-01 PROBLEM — R07.9 CHEST PAIN ON EXERTION: Status: RESOLVED | Noted: 2022-11-23 | Resolved: 2023-03-01

## 2023-03-01 PROBLEM — R60.0 EDEMA OF LEFT LOWER EXTREMITY: Chronic | Status: RESOLVED | Noted: 2020-09-18 | Resolved: 2023-03-01

## 2023-03-01 PROBLEM — R00.2 INTERMITTENT PALPITATIONS: Status: RESOLVED | Noted: 2020-09-25 | Resolved: 2023-03-01

## 2023-03-01 PROBLEM — R07.9 CHEST PAIN: Status: RESOLVED | Noted: 2023-01-31 | Resolved: 2023-03-01

## 2023-03-01 PROBLEM — I20.9 ANGINA PECTORIS, UNSPECIFIED (HCC): Status: RESOLVED | Noted: 2022-01-18 | Resolved: 2023-03-01

## 2023-03-01 PROBLEM — I25.119 ATHEROSCLEROTIC HEART DISEASE OF NATIVE CORONARY ARTERY WITH UNSPECIFIED ANGINA PECTORIS (HCC): Status: RESOLVED | Noted: 2022-01-18 | Resolved: 2023-03-01

## 2023-03-01 PROCEDURE — 3017F COLORECTAL CA SCREEN DOC REV: CPT | Performed by: INTERNAL MEDICINE

## 2023-03-01 PROCEDURE — G8427 DOCREV CUR MEDS BY ELIG CLIN: HCPCS | Performed by: INTERNAL MEDICINE

## 2023-03-01 PROCEDURE — G8484 FLU IMMUNIZE NO ADMIN: HCPCS | Performed by: INTERNAL MEDICINE

## 2023-03-01 PROCEDURE — 1036F TOBACCO NON-USER: CPT | Performed by: INTERNAL MEDICINE

## 2023-03-01 PROCEDURE — G8417 CALC BMI ABV UP PARAM F/U: HCPCS | Performed by: INTERNAL MEDICINE

## 2023-03-01 PROCEDURE — 3079F DIAST BP 80-89 MM HG: CPT | Performed by: INTERNAL MEDICINE

## 2023-03-01 PROCEDURE — 3075F SYST BP GE 130 - 139MM HG: CPT | Performed by: INTERNAL MEDICINE

## 2023-03-01 PROCEDURE — 99215 OFFICE O/P EST HI 40 MIN: CPT | Performed by: INTERNAL MEDICINE

## 2023-03-01 PROCEDURE — 1111F DSCHRG MED/CURRENT MED MERGE: CPT | Performed by: INTERNAL MEDICINE

## 2023-03-01 NOTE — PROGRESS NOTES
Chief Complaint   Patient presents with    [address] F/u  CONSULT DATE:  06/19/2020    REASON OF CONSULTATION:  Worsening of shortness of breath and  palpitation with history of intermittent chest pain in the last several  weeks. PROVIDER:     Aki Johnson. Nish Fong M.D.     Shirley Carcamo:  11/24/2022     REASON FOR CONSULTATION:  Chest pain, elevated troponin in a 45year-old  gentleman, presented with recurrent chest pain over the last four to  five days. Last seen in office dec 2020  Later seen in hospital nov 2022    Today came to office for post hospital f/u        3 month follow up. And post hospital f/u  Had cath and pci of the RCA nov 2022 and cath feb 2023 and patent stent     EKG done 2-1-2023. Denied cp, sob, palpitation of edema    Occasional dizziness    RT AKA- due to diabetes      Small Pericardial effusion  noted on CT chest not confirmed on echo      Patient Active Problem List   Diagnosis    Status post below knee amputation of right lower extremity (HCC)    Gait disturbance    Sebaceous cyst    Uncontrolled type 2 diabetes mellitus with hyperglycemia, with long-term current use of insulin (HCC)    Severe bipolar II disorder, recent episode major depressive, remission (HCC)    Bipolar 1 disorder (HCC)    Hyponatremia    Normocytic anemia    Obesity (BMI 30-39. 9)    History of noncompliance with medical treatment    Essential hypertension    Tobacco dependence    Gastroesophageal reflux disease without esophagitis    History of marijuana use    Physical debility    Anemia    Electrolyte imbalance    Type 2 diabetes mellitus with hyperglycemia, with long-term current use of insulin (HCC)    Weakness    Infection of above knee amputation stump of right leg (HCC)    Above knee amputation of right lower extremity (HCC)    Vitamin D deficiency    COPD exacerbation (HCC)    Influenza B    Depression, recurrent (HCC)    Major depressive disorder, recurrent (HCC)    GI bleed Elevated lipase    Other psychoactive substance abuse, uncomplicated (HCC)    Calculus of gallbladder without cholecystitis without obstruction    Right upper quadrant abdominal pain    Pedal edema    MURALI (obstructive sleep apnea)    Shortness of breath    Adult hypothyroidism    Anxiety and depression    Dyspnea    Sinus tachycardia    SOB (shortness of breath) on exertion    History of chest pain    Dizziness, nonspecific    Hyperlipidemia    Neuropathy    NSTEMI (non-ST elevated myocardial infarction) (HCC)    Venous stasis ulcer of left lower leg with edema of left lower leg (HCC)    Chronic diarrhea    Lower abdominal pain    Above knee amputation of left lower extremity ([address]    COVID-19 virus infection    Coronary artery disease involving native coronary artery of native heart without angina pectoris    S/P angioplasty with stent of the RCA nov 2022    Chronic diastolic congestive heart failure [facility])       Past Surgical History:   Procedure Laterality Date    ABDOMEN SURGERY      ABSCESS DRAINAGE Right     foot    AMPUTATION ABOVE KNEE Right 4/18/2019    RIGHT ABOVE KNEE AMPUTATION performed by Scarlet Mathur MD at [facility] LAPAROSCOPIC N/A 4/1/2020    ROBOTIC CHOLECYSTECTOMY performed by Laura Mireles MD at [facility] N/A 7/20/2020    CYSTOSCOPY performed by Kevan Eisenberg MD at [address] [place] [address] 8/21/2020    CYSTOSCOPY, UROLIFT performed by Kevan Eisenberg MD at [facility], ESOPHAGUS      ENDOSCOPY, COLON, DIAGNOSTIC      FOOT DEBRIDEMENT Right 07/01/2016    I & D    GASTROCNEMIUS RECESSION Left 11/12/2021    LEFT LEG GASTROCS RECESSION performed by Yonas Degroot MD at [facility] Right 2/18/2019    I&D RIGHT STUMP performed by Scarlet Mathur MD at [facility] Right 07/20/2016    LEG AMPUTATION BELOW KNEE Right 4/4/2019    I&D AND REVISION OF AMPUTATION RIGHT LEG performed by Caryle Skeeter, MD at [facility] Right 1/14/15    sole of foot I&D    [place] I&D SHOULDER INFECTED BURSA Left 8/18/2017    LEFT SHOULDER INCISION AND DRAINAGE performed by Caryle Skeeter, MD at [address]  [place] [facility]/OUTPT [facility] Right 9/20/2018    EXCISIONAL DEBRIDEMENT RIGHT BKA STUMP performed by Luis Pollard MD at [address] Right 1/16/15    2nd toe with wound vac applied    UPPER GASTROINTESTINAL ENDOSCOPY N/A 3/3/2020    EGD DILATION SAVORY performed by Marissa Anthony MD at [facility] N/A 6/15/2022    EGD DILATION BALLOON performed by Domenic Kaplan MD at [address] [facility]  ? when       Allergies   Allergen Reactions    Pcn [Penicillins] Shortness Of Breath, Nausea And Vomiting and Other (See Comments)     Does not remember reactions. Has TOLERATED amoxicillin and several different cephalosporins. Actos [Pioglitazone] Swelling    Clindamycin/Lincomycin Itching    Vancomycin Hives      Rash, with erythematous plaques to abdomen, shoulders, and proximal upper extremities with pruritis, persisted with 2nd dose administered slower.       Metformin And Related Swelling        Family History   Problem Relation Age of Onset    Diabetes Mother     Other Mother         pneumonia, H1N1    Depression Mother     Early Death Mother     High Blood Pressure Mother     High Cholesterol Mother     Vision Loss Maternal Grandmother     Arthritis Maternal Grandfather     Heart Disease Maternal Grandfather         Social History     Socioeconomic History    Marital status:      Spouse name: Not on file    Number of children: 2    Years of education: 15    Highest education level: Not on file   Occupational History    Not on file   Tobacco Use    Smoking status: Former     Packs/day: 0.00     Years: 10.00     Pack years: 0.00     Types: Cigars, Cigarettes     Start date: 1/23/1985     Quit date:      Years since quittin.5    Smokeless tobacco: Never   Vaping Use    Vaping Use: Former    Substances: Nicotine, Flavoring   Substance and Sexual Activity    Alcohol use: Not Currently     Alcohol/week: 0.0 standard drinks    Drug use: No     Comment: 30 YEARS AGO    Sexual activity: Yes     Partners: Female   Other Topics Concern    Not on file   Social History Narrative    Not on file     Social Determinants of Health     Financial Resource Strain: Medium Risk    Difficulty of Paying Living Expenses: Somewhat hard   Food Insecurity: Food Insecurity Present    Worried About [employer] in the Last Year: Never true    Ran Out of Food in the Last Year: Often true   Transportation Needs: Unknown    Lack of Transportation (Medical): Not on file    Lack of Transportation (Non-Medical): No   Physical Activity: Not on file   Stress: Not on file   Social Connections: Not on file   Intimate Partner Violence: Not on file   Housing Stability: Unknown    Unable to Pay for Housing in the Last Year: Not on file    Number of Places Lived in the Last Year: Not on file    Unstable Housing in the Last Year: No       Current Outpatient Medications   Medication Sig Dispense Refill    omeprazole (PRILOSEC) 40 MG delayed release capsule Take 1 capsule by mouth every morning (before breakfast) 30 capsule 2    gabapentin (NEURONTIN) 800 MG tablet Take 1 tablet by mouth 3 times daily for 90 doses.  90 tablet 2    isosorbide mononitrate (IMDUR) 30 MG extended release tablet Take 1 tablet by mouth daily 30 tablet 3    furosemide (LASIX) 20 MG tablet Take 1 tablet by mouth daily 60 tablet 3    lisinopril (PRINIVIL;ZESTRIL) 5 MG tablet Take 1 tablet by mouth daily Start this med in two days from 23 30 tablet 3    metoprolol tartrate (LOPRESSOR) 25 MG tablet Take 1 tablet by mouth 2 times daily Re start tomorrow evening 2/3 60 tablet 3    polyethylene glycol (GLYCOLAX) 17 g packet Take 17 g by mouth daily as needed for Constipation 527 g 1    Insulin Glargine, 2 Unit Dial, (TOUJEO MAX SOLOSTAR) 300 UNIT/ML SOPN INJECT 85 UNITS IN THE MORNING, 80 UNITS IN THE EVENING. EVERY 10 DAYS INCREASE BY 5 UNITS BOTH TIMES, TO GET MORNING GLUCOSE 150-200. 42 mL 2    levothyroxine (SYNTHROID) 50 MCG tablet TAKE 1 TABLET BY MOUTH ONCE DAILY. 90 tablet 1    Dulaglutide (TRULICITY) 4.5 KZ/9.6VE SOPN INJECT 4.5 MG INTO THE SKIN ONCE A WEEK . 2 mL 5    empagliflozin (JARDIANCE) 25 MG tablet TAKE 1 TABLET BY MOUTH ONCE DAILY.  30 tablet 5    atorvastatin (LIPITOR) 80 MG tablet Take 1 tablet by mouth at bedtime 30 tablet 5    ticagrelor (BRILINTA) 90 MG TABS tablet Take 1 tablet by mouth 2 times daily 60 tablet 5    ascorbic acid (VITAMIN C) 500 MG tablet Take 1 tablet by mouth daily 30 tablet 3    aspirin 81 MG chewable tablet Take 1 tablet by mouth daily 30 tablet 3    zinc sulfate (ZINCATE) 220 (50 Zn) MG capsule Take 1 capsule by mouth daily 30 capsule 3    Cholecalciferol (VITAMIN D) 25 MCG TABS Take 1 tablet by mouth daily 60 tablet 0    insulin lispro (HUMALOG KWIKPEN U-200) 200 UNIT/ML SOPN pen 40 units Plus scale before meals; only takes if BG >200 - Max dose 160 units per day (Patient taking differently: 40 units Plus scale before meals; only takes if BG >200 - Max dose 160 units per day     States not doing sliding scale) 30 Adjustable Dose Pre-filled Pen Syringe 3    Multiple Vitamins-Minerals (EYE VITAMINS PO) Take 1 tablet by mouth daily      Omega-3 Fatty Acids (FISH OIL PO) Take 1 caplet by mouth daily      Probiotic Product (PROBIOTIC PO) Take 1 tablet by mouth daily      Continuous Blood Gluc Sensor (DEXCOM G6 SENSOR) MISC 1 Device by Does not apply route every 14 days 9 each 3    Continuous Blood Gluc Transmit (DEXCOM G6 TRANSMITTER) MISC 1 Device by Does not apply route every 3 months 1 each 3    nitroGLYCERIN (NITROSTAT) 0.3 MG SL tablet Place 1 tablet under the tongue every 5 minutes as needed for Chest pain up to max of 3 total doses. If no relief after 1 dose, call 911. 30 tablet 3    Insulin Pen Needle (TRUEPLUS PEN NEEDLES) 31G X 8 MM MISC USE AS DIRECTED 200 each 1    sertraline (ZOLOFT) 100 MG tablet Take 100 mg by mouth daily       Continuous Blood Gluc  (DEXCOM G6 ) LIANNE 1 Device by Does not apply route 4 times daily (before meals and nightly) 1 Device 0    ARIPiprazole (ABILIFY) 5 MG tablet Take 5 mg by mouth daily        No current facility-administered medications for this visit. Review of Systems -     General ROS: negative  Psychological ROS: negative  Hematological and Lymphatic ROS: No history of blood clots or bleeding disorder. Respiratory ROS: no cough,  or wheezing, the rest see HPI  Cardiovascular ROS: See HPI  Gastrointestinal ROS: negative  Genito-Urinary ROS: no dysuria, trouble voiding, or hematuria  Musculoskeletal ROS: negative  Neurological ROS: no TIA or stroke symptoms  Dermatological ROS: negative      Blood pressure 138/80, pulse 94, height 5' 6\" (1.676 m), weight 225 lb (102.1 kg).         Physical Examination:    General appearance - alert, well appearing, and in no distress  HEENT- Pink conjunctiva  , Non-icteri sclera,PERRLA  Mental status - alert, oriented to person, place, and time  Neck - supple, no significant adenopathy, no JVD, or carotid bruits  Chest - clear to auscultation, no wheezes, rales or rhonchi, symmetric air entry  Heart - normal rate, regular rhythm, normal S1, S2, no murmurs, rubs, clicks or gallops  Abdomen - soft, nontender, nondistended, no masses or organomegaly  ISMAEL- no CVA or flank tenderness, no suprapubic tenderness  Neurological - alert, oriented, normal speech, no focal findings or movement disorder noted  Musculoskeletal/limbs - no joint tenderness, deformity or swelling   - peripheral pulses normal, no pedal edema, no clubbing or cyanosis  Skin - normal coloration and turgor, no rashes, no suspicious skin lesions noted  Psych- appropriate mood and affect    Lab  No results for input(s): CKTOTAL, CKMB, CKMBINDEX, TROPONINI in the last 72 hours. CBC:   Lab Results   Component Value Date/Time    WBC 9.3 02/02/2023 07:44 AM    RBC 4.42 02/02/2023 07:44 AM    HGB 14.2 02/02/2023 07:44 AM    HCT 41.6 02/02/2023 07:44 AM    MCV 94.1 02/02/2023 07:44 AM    MCH 32.1 02/02/2023 07:44 AM    MCHC 34.1 02/02/2023 07:44 AM    RDW 14.5 12/26/2020 09:39 AM     02/02/2023 07:44 AM    MPV 9.4 02/02/2023 07:44 AM     BMP:    Lab Results   Component Value Date/Time     02/02/2023 07:44 AM    K 4.2 02/02/2023 07:44 AM    K 4.0 11/23/2022 03:53 PM     02/02/2023 07:44 AM    CO2 18 02/02/2023 07:44 AM    BUN 18 02/02/2023 07:44 AM    LABALBU 3.3 02/02/2023 07:44 AM    CREATININE 0.8 02/02/2023 07:44 AM    CALCIUM 8.1 02/02/2023 07:44 AM    GFRAA >60 08/24/2016 05:54 PM    LABGLOM >60 02/02/2023 07:44 AM    GLUCOSE 151 02/02/2023 07:44 AM    GLUCOSE 114 12/26/2020 09:39 AM     Hepatic Function Panel:    Lab Results   Component Value Date/Time    ALKPHOS 75 02/02/2023 07:44 AM    ALT <5 02/02/2023 07:44 AM    AST 31 02/02/2023 07:44 AM    PROT 6.8 02/02/2023 07:44 AM    BILITOT 0.7 02/02/2023 07:44 AM    BILIDIR <0.2 02/02/2023 07:44 AM    IBILI 0.1 03/02/2015 11:30 AM    LABALBU 3.3 02/02/2023 07:44 AM     Magnesium:    Lab Results   Component Value Date/Time    MG 2.2 11/23/2022 09:00 AM     Warfarin PT/INR:  No components found for: PTPATWAR, PTINRWAR  HgBA1c:    Lab Results   Component Value Date/Time    LABA1C 6.5 12/19/2022 10:39 AM    LABA1C 7.8 09/15/2022 09:06 AM     FLP:    Lab Results   Component Value Date/Time    TRIG 200 02/02/2023 12:40 PM    HDL 27 02/02/2023 12:40 PM    LDLCALC 53 02/02/2023 12:40 PM    LDLDIRECT 102.12 02/26/2021 10:53 AM     TSH:    Lab Results   Component Value Date/Time    TSH 2.480 11/23/2022 09:00 AM         CORONARY ANGIOGRAM:  LEFT MAIN:  Patent without any significant obstruction.      LAD:  Mild luminal irregularities but patent without any significant  obstruction. Diagonal 1 and diagonal 2 branches without any  obstruction. LCX:  Gives rise to large OM1, OM2 system without any obstruction. At  the takeoff of OM1, there is about 30 to 40% stenosis in AV groove of  circ. RCA:  Mid 80 to 90% stenosis. Gives rise to PDA and PLV both without  any obstruction. RCA is dominant. INTERVENTION:  Given the findings on presentation, I proceeded with  intervention of the RCA. Exchanged out for 6-Ind[place]ian JR4 guiding  catheter. Heparin IV was given. ACT was confirmed to be above 250  seconds. I wired the lesion using Runthrough wire. I then direct  stented the lesion using 2.5 x 18 Resolute Petey OLEG at nominal pressure. I postdilated the stent with 4.0 NC balloon up to 14 atmospheres. Post  PCI angiography demonstrated NANCI-3 flow without any perforation,  dissection, distal embolization, side branch loss, or guide or  guidewire-induced trauma. All equipment was removed from the patient. The patient tolerated the procedure well. IMMEDIATE COMPLICATIONS:  None. MEDICATIONS:  See MAR. ACCESS:  Vasc band used for hemostasis. ESTIMATED BLOOD LOSS:  Less than 50 mL. SUMMARY:  Successful PCI of the RCA with one drug-eluting stent. PLAN:  1. Bedrest.  2.  Optimal medical therapy. 3.  Risk factor management. 4.  Routine access site care. 5.  DAPT. 6.  IV fluids. 7.  Continue management of the patient's chronic medical issue. 8.  Uptitrate antianginals. 9.  Guideline directed therapy for CAD/ACS. 10.  Follow up with myself or primary cardiologist in three to four  weeks postprocedure. All the above was explained to the patient and the patient's family. They are agreeable and amenable to plan. Daren Easton MD     D: 11/25/2022 19:10:45         SUMMARY:  No significant epicardial coronary artery disease; patent RCA  stent; likely microvascular dysfunction; preserved LVEF. PLAN:  1. Bedrest.  2.  Optimal medical therapy. 3.  Risk factor management. 4.  Routine access site care. 5.  Uptitrate antianginals. 6.  If he is maximally tolerating antianginal therapy and still having  discomfort, we will search for alternative causes of the patient's chest  pain. All the above were explained to the patient and the patient's family. They are agreeable and amenable to the above plan. Lynette Fulton MD     D: 02/02/2023 10:03:           1. There is a small pericardial effusion. 2. There is suboptimal opacification of the pulmonary arterial system. However, there are no large pulmonary emboli noted in the main pulmonary outflow tract or right or left main pulmonary arteries. This report has been created using voice recognition software. It may contain minor errors which are inherent in voice recognition technology. Final report electronically signed by Dr Joe Keita on 9/3/2020 12:07 AM       Conclusions      Summary   Lexiscan EKG stress test is not suggestive for ischemia. The nuclear images is not suggestive for myocardial ischemia. Signatures      ----------------------------------------------------------------   Electronically signed by Giacomo Chisholm MD (Interpreting   Cardiologist) on 06/19/2020 at 18:21   ----------------------------------------------------------------         Conclusions      Summary   Normal left ventricle size and systolic function. Ejection fraction was   estimated at 60 %. There were no regional left ventricular wall motion   abnormalities and wall thickness was within normal limits. Doppler parameters were consistent with abnormal left ventricular   relaxation (grade 1 diastolic dysfunction). The pericardium was normal in appearance with no evidence of a pericardial   effusion.       Signature    ----------------------------------------------------------------   Electronically signed by Barak Mota, David GUILLEN (Interpreting   physician) on 09/15/2020 at 05:55 PM         Conclu    Conclusions      Summary   Normal left ventricle size and systolic function. Ejection fraction was   estimated at 55 %. There were no regional left ventricular wall motion   abnormalities and wall thickness was within normal limits. Signature      ----------------------------------------------------------------   Electronically signed by Beverley Albert MD (Interpreting   physician) on 11/25/2022 at 08:24 PM   ----------------------------------------------------------------sions      Summary   Mild concentric left ventricular hypertrophy. Left ventricle size is normal.   There were no regional wall motion abnormalities. Ejection fraction is visually estimated at 55%. IVC size is within normal limits with normal respiratory phasic changes. Signature      ----------------------------------------------------------------   Electronically signed by Rossy Carrero MD (Interpreting   physician) on 02/01/2023 at 05:27 PM   ----------------------------------------------------------------    Ekg 1/31/23  Sinus rhythm with 1st degree A-V block Otherwise normal ECG When compared with ECG of 31-JAN-2023 18:34, SD interval has increased Confirmed by Darion Rodriguez MD, Joel Mccauley ([id number]) on 2/1/2023 7:29:08 PM    Assessment       Diagnosis Orders   1. Coronary artery disease involving native coronary artery of native heart without angina pectoris        2. Essential hypertension        3. Pure hypercholesterolemia        4. Chronic diastolic congestive heart failure ([address]        5. S/P angioplasty with stent of the RCA nov 2022                Assessment nov 2022    NSTEMI  S/p cath 11/25/22 - s/p OLEG RCA  Ef 55 per TTE 11/25/22  HTN  HLD  DM   Hx right AKA  +COVID       ASSESSMENT:  1.   Basically, this overall is a 68-year-old gentleman, presented with  chest pain basically and cough and with fever, noted to have elevated  troponin and COVID-19 infection, and has been having recurrent chest  pain even after admission. 2.  The patient should be treated as acute coronary syndrome,  non-ST-elevation myocardial infarction in view of the troponin elevation  and recurrent chest pain. 3.  COVID-19, upper respiratory tract infection with cough, productive. 4.  History of cardiac catheterization in 2018 at [facility], mid LAD  50%, RCA 30%. 5.  Diabetes. 6.  Hyperlipidemia. 7.  Above-knee amputation on the right. 8.  Hypothyroidism. admission DX 2020    ASSESSMENT:  1. Shortness of breath, basically unexplained the course of which is  not very clear, so probably the patient may benefit from pulmonary  function study on that line and once cardiac cause of short of breath  has been ruled out. He has history of smoking, and he has truncal  obesity. 2.  Hypertension. 3.  Diabetes with complication, neuropathy and diabetic foot abscess,  above-the-knee amputation on the right, wheelchair-bound. 4.  Hypothyroidism. 5.  Anxiety, depression, bipolar disorder. 6.  Above-the-knee amputation on the right related to diabetes. 7.  Wheelchair-bound. 8.  Palpitation, intermittent in the last several months. 9.  History of intermittent chest pain once in a while. Plan     The  most current meds and labs reviewdd    Patient Seen, Chart, Consults notes, Labs, Radiology studies reviewed. Coronary artery disease, seems to be stable. Denies angina or change in breathing pattern  Cont DAPT  Cont statin lopressor    Hypertension, on medical treatment. Seems to be under good control. Patient is compliant with medical treatment. Hyperlipidemia: on statins, followed periodically. Patient need periodic lipid and liver profile.     Congestive heart failure: no evidence of fluid overload today, no recent hospitalization for CHF  On lasix 20 po qd not on kcl and k wnl  Hx of    MURALI and to have CPAP    Hx of Sob on exertion  PFT July 2020  Mild diffusion reduction- no other abn    D/w the pat the plan of care    Lipid panel and liver function test before next appointment      Noncompliance with f/u advised    Stable and doing well    Lab prior to next visit    Discussed use, benefit, and side effects of prescribed medications. All patient questions answered. Pt voiced understanding. Instructed to continue current medications, diet and exercise. Continue risk factor modification and medical management. Patient agreed with treatment plan. Follow up as directed.        I spent 40 minutes involved in face-to-face discussion of medical issues, prognosis, record review  and plan with the patient today and more than 50% of the time was spent on counseling and coordination of care      RTC in 4 months        [facility]

## 2023-03-02 ENCOUNTER — CARE COORDINATION (OUTPATIENT)
Dept: CASE MANAGEMENT | Age: 53
End: 2023-03-02

## 2023-03-02 NOTE — CARE COORDINATION
Care Transitions Outreach Attempt-1st attempt    Call within 2 business days of discharge: Yes   Attempted to reach patient for subsequent transitional call. Left HIPPA compliant VM to return call directly to [phone]. Patient: Gerald Rosa Patient : 1970 MRN: [id number]    Last Discharge  Street       Date Complaint Diagnosis Description Type Department Provider    23 Chest Pain Chest pain, unspecified type . .. ED to Hosp-Admission (Discharged) (ADMITTED) Domitila Jolly MD; Gilson Howard MD;...             Noted following upcoming appointments from discharge chart review:   [facility] follow up appointment(s):   Future Appointments   Date Time Provider Ebenezer Carmichael   3/8/2023  1:30 PM Vaughn Story MD [facility]   3/21/2023 11:00 AM Jerrica Grover RN [facility] [place]   2023  1:45 PM Pete Gaytan MD [facility] [place]   2023  8:15 AM Marquis Hayden MD N [facility] [place]     Non-[facility] follow up appointment(s): celso

## 2023-03-06 ENCOUNTER — CARE COORDINATION (OUTPATIENT)
Dept: CASE MANAGEMENT | Age: 53
End: 2023-03-06

## 2023-03-06 NOTE — CARE COORDINATION
Care Transitions Outreach Attempt-2nd attempt    Call within 2 business days of discharge: Yes   Attempted to reach patient for subsequent transitional call.  Left HIPPA compliant  to return call directly to [phone].  [facility] with emergency contact to have Jamie call.    Patient: Jamie Spangler Patient : 1970 MRN: [id number]    Last Discharge [facility] Facility       Date Complaint Diagnosis Description Type Department Provider    23 Chest Pain Chest pain, unspecified type ... ED to Hosp-Admission (Discharged) (ADMITTED) BLANK Ortega MD; Carmen Dawkins MD;...              Noted following upcoming appointments from discharge chart review:   [facility] follow up appointment(s):   Future Appointments   Date Time Provider Department Center   3/8/2023  1:30 PM Rayray Mitchell MD AFL [facility] END   3/21/2023 11:00 AM Lia Cruz RN [facility]PX Physic [facility] [place]   2023  1:45 PM Phuc Ortega MD [facility] Physic [facility] [place]   2023  8:15 AM David Hong MD N SRPX Heart [facility] [place]     Non-[facility] follow up appointment(s): na

## 2023-03-09 ENCOUNTER — CARE COORDINATION (OUTPATIENT)
Dept: CASE MANAGEMENT | Age: 53
End: 2023-03-09

## 2023-03-09 NOTE — CARE COORDINATION
Care Transitions Outreach Attempt-3rd attempt    Call within 2 business days of discharge: Yes   Attempted to reach patient for subsequent transitional call. Left HIPPA compliant VM to return call directly to [phone]. Will attempt next week for final attempt d/t RPM.    Patient: Gordo Santillan Patient : 1970 MRN: [id number]    Last Discharge  [address]       Date Complaint Diagnosis Description Type Department Provider    23 Chest Pain Chest pain, unspecified type . .. ED to Hosp-Admission (Discharged) (ADMITTED) Tristian Alas MD; Marilee Kearney MD;...             Noted following upcoming appointments from discharge chart review:   [facility] follow up appointment(s):   Future Appointments   Date Time Provider Ebenezer Carmichael   3/21/2023 11:00 AM Manny Reyes, RN [address] [facility]   2023 10:30 AM Jovany Bush MD AFL APEX AFL APEX END   2023  1:45 PM Gideon Bourgeois MD SRPX Physic [facility] [place]   2023  8:15 AM Katty Santiago MD N SRPX Heart [place]     Non-[facility] follow up appointment(s): celso

## 2023-03-16 ENCOUNTER — CARE COORDINATION (OUTPATIENT)
Dept: CARE COORDINATION | Age: 53
End: 2023-03-16

## 2023-03-16 ENCOUNTER — CARE COORDINATION (OUTPATIENT)
Dept: CASE MANAGEMENT | Age: 53
End: 2023-03-16

## 2023-03-16 NOTE — CARE COORDINATION
Care Transitions Outreach Attempt-4th attempt    Call within 2 business days of discharge: Yes   Attempted to reach patient for subsequent transitional call. Left HIPPA compliant VM to return call directly to [phone]. Enrolled in RPM but never activated even after said he would. This is the 4th attempt to contact him. Will request discontinue from RPM.   Unable to reach letter not sent, end of episode. Patient: Tong Torres Patient : 1970 MRN: [id number]    Last Discharge  Street       Date Complaint Diagnosis Description Type Department Provider    23 Chest Pain Chest pain, unspecified type . .. ED to Hosp-Admission (Discharged) (ADMITTED) Neeta Li MD; Jensen Perdomo MD;...             Noted following upcoming appointments from discharge chart review:   [facility] follow up appointment(s):   Future Appointments   Date Time Provider Ebenezer Carmichael   3/21/2023 11:00 AM Michael Dodd, RN [address] [facility]   2023 10:30 AM Domonique Merino MD AFL APEX AFL APEX END   2023  1:45 PM Mary Glover MD SRPX Physic [facility] [place]   2023  8:15 AM Drake Cavanaugh MD N SRPX Heart [facility]     Non-[facility] follow up appointment(s): celso

## 2023-03-16 NOTE — CARE COORDINATION
Paramedic attempted to contact patient in regards to RPM Return Kit order. Patient is unavailable at this time. Left HIPAA compliant message with Paramedic contact information, reason for call and request for call back as well as return kit process instructions. Will expect a return call. RPM Return Kit Order was completed successfully.

## 2023-03-21 ENCOUNTER — CARE COORDINATION (OUTPATIENT)
Dept: CARE COORDINATION | Age: 53
End: 2023-03-21

## 2023-03-21 ENCOUNTER — OFFICE VISIT (OUTPATIENT)
Dept: INTERNAL MEDICINE CLINIC | Age: 53
End: 2023-03-21
Payer: MEDICARE

## 2023-03-21 VITALS
HEART RATE: 99 BPM | DIASTOLIC BLOOD PRESSURE: 80 MMHG | SYSTOLIC BLOOD PRESSURE: 140 MMHG | HEIGHT: 66 IN | TEMPERATURE: 97.6 F | BODY MASS INDEX: 36.32 KG/M2

## 2023-03-21 DIAGNOSIS — E11.65 TYPE 2 DIABETES MELLITUS WITH HYPERGLYCEMIA, WITH LONG-TERM CURRENT USE OF INSULIN (HCC): Primary | ICD-10-CM

## 2023-03-21 DIAGNOSIS — Z79.4 TYPE 2 DIABETES MELLITUS WITH HYPERGLYCEMIA, WITH LONG-TERM CURRENT USE OF INSULIN (HCC): Primary | ICD-10-CM

## 2023-03-21 LAB — HBA1C MFR BLD: 6.5 % (ref 4.3–5.7)

## 2023-03-21 PROCEDURE — G0108 DIAB MANAGE TRN  PER INDIV: HCPCS | Performed by: INTERNAL MEDICINE

## 2023-03-21 PROCEDURE — 83036 HEMOGLOBIN GLYCOSYLATED A1C: CPT | Performed by: INTERNAL MEDICINE

## 2023-03-21 NOTE — CARE COORDINATION
Attempted to reach  Irving today for enrollment in care coordination. No answer. Message left to return call.

## 2023-03-21 NOTE — PATIENT INSTRUCTIONS
Think about taking 1/2 of your insulin dose instead of holding it when your blood sugar is good or low  Good job carrying glucose gel  Call the clinic if you are having trouble with your blood sugars  We want to match the carbs you are eating with the Humalog you are            Taking.          Consider trying a correction scale that is multiples of 4  Blood Sugar Dose fast acting insulin    None   140-199 4 unit   200-249 8 units   250-299 12 units   300-349 16 units   350-399 20 units   400 and above 24 units

## 2023-03-21 NOTE — PROGRESS NOTES
Current exercise: workout at gym 3 days per week; 1 hour    Health Maintenance:  Eye exam current (within one year): yes Date: 6/2/22, Tati Taveras  Any history of foot problems? yes Hx amputation right AKA  Foot exam up to date: yes Date: 6/7/22 Dr. Ish Garcia  Immunizations up to date:    Pneumonia (dose of Prevnar 20 OR Pneumovax 23 + Prevnar 13): no   Influenza: no  The ASCVD Risk score (Humaira ROE, et al., 2019) failed to calculate for the following reasons: The patient has a prior MI or stroke diagnosis   Last panel - LDL:   Lab Results   Component Value Date    LDLCALC 53 02/02/2023    LDLDIRECT 102.12 02/26/2021     Taking ASA:  Yes   Taking statin: Yes - atorvastatin  Last microalbumin/creatinine ratio: No results found for: MCACR, MALBCR, MALBCRRATEXT   Taking ACE/ARB: Yes- lisinopril  Depression screening completed. 2/8/2023    Focus:   Diabetes Education. Last A1C: 6.5% 3/21/23. Marina Dunn is doing well with A1C level, but glucose levels are quite variable. He states he is doing the same thing/ eating the same for the past several months. He is holding insulin doses when BS is  or lower. He is not limiting carbs, but is eating less overall. He does workout at the gym 3 days per week. He is very agitated today, stating he just got bad news and is very upset. He does not want to answer questions or discuss problem solving. He asks to end visit right away. Brief discussion about insulin dosing--using a scale/ not holding insulin for lower BS (use at least 1/2 dose). Agreed to end discussion today; encouraged follow up as needed for BS's not in goal. He feels he is doing well at this time.      Curriculum Area Focus: Glucose checks/goals, Carbohydrate counting/meal planning, Hypoglycemia management, Pattern management, and Medication adherence  DSME PLAN:     Think about taking 1/2 of your insulin dose instead of holding it when your blood sugar is good or low  Good job carrying glucose

## 2023-03-23 ENCOUNTER — CARE COORDINATION (OUTPATIENT)
Dept: CARE COORDINATION | Age: 53
End: 2023-03-23

## 2023-03-28 ENCOUNTER — CARE COORDINATION (OUTPATIENT)
Dept: CARE COORDINATION | Age: 53
End: 2023-03-28

## 2023-04-18 ENCOUNTER — HOSPITAL ENCOUNTER (OUTPATIENT)
Age: 53
Discharge: HOME | End: 2023-04-18
Payer: MEDICARE

## 2023-04-18 DIAGNOSIS — I10 ESSENTIAL HYPERTENSION: ICD-10-CM

## 2023-04-18 DIAGNOSIS — E11.65 TYPE 2 DIABETES MELLITUS WITH HYPERGLYCEMIA, WITH LONG-TERM CURRENT USE OF INSULIN (HCC): ICD-10-CM

## 2023-04-18 DIAGNOSIS — E03.9 HYPOTHYROIDISM, UNSPECIFIED TYPE: ICD-10-CM

## 2023-04-18 DIAGNOSIS — Z79.4 TYPE 2 DIABETES MELLITUS WITH HYPERGLYCEMIA, WITH LONG-TERM CURRENT USE OF INSULIN (HCC): ICD-10-CM

## 2023-04-18 DIAGNOSIS — E78.5 HYPERLIPIDEMIA LDL GOAL <70: ICD-10-CM

## 2023-04-18 LAB
ALBUMIN SERPL BCG-MCNC: 4.3 G/DL (ref 3.5–5.1)
ALP SERPL-CCNC: 78 U/L (ref 38–126)
ALT SERPL W/O P-5'-P-CCNC: 23 U/L (ref 11–66)
ANION GAP SERPL CALC-SCNC: 12 MEQ/L (ref 8–16)
AST SERPL-CCNC: 30 U/L (ref 5–40)
BILIRUB SERPL-MCNC: 0.6 MG/DL (ref 0.3–1.2)
BUN SERPL-MCNC: 12 MG/DL (ref 7–22)
CALCIUM SERPL-MCNC: 9.8 MG/DL (ref 8.5–10.5)
CHLORIDE SERPL-SCNC: 98 MEQ/L (ref 98–111)
CHOLEST SERPL-MCNC: 161 MG/DL (ref 100–199)
CO2 SERPL-SCNC: 26 MEQ/L (ref 23–33)
CREAT SERPL-MCNC: 0.8 MG/DL (ref 0.4–1.2)
GFR SERPL CREATININE-BSD FRML MDRD: > 60 ML/MIN/1.73M2
GLUCOSE SERPL-MCNC: 195 MG/DL (ref 70–108)
HDLC SERPL-MCNC: 39 MG/DL
LDLC SERPL CALC-MCNC: 97 MG/DL
POTASSIUM SERPL-SCNC: 5.1 MEQ/L (ref 3.5–5.2)
PROT SERPL-MCNC: 7.7 G/DL (ref 6.1–8)
SODIUM SERPL-SCNC: 136 MEQ/L (ref 135–145)
T4 FREE SERPL-MCNC: 1.23 NG/DL (ref 0.93–1.76)
TRIGL SERPL-MCNC: 123 MG/DL (ref 0–199)
TSH SERPL DL<=0.005 MIU/L-ACNC: 2.65 UIU/ML (ref 0.4–4.2)

## 2023-04-18 PROCEDURE — 80053 COMPREHEN METABOLIC PANEL: CPT

## 2023-04-18 PROCEDURE — 80061 LIPID PANEL: CPT

## 2023-04-18 PROCEDURE — 84443 ASSAY THYROID STIM HORMONE: CPT

## 2023-04-18 PROCEDURE — 84439 ASSAY OF FREE THYROXINE: CPT

## 2023-04-18 PROCEDURE — 36415 COLL VENOUS BLD VENIPUNCTURE: CPT

## 2023-04-20 ENCOUNTER — OFFICE VISIT (OUTPATIENT)
Dept: INTERNAL MEDICINE CLINIC | Age: 53
End: 2023-04-20
Payer: MEDICARE

## 2023-04-20 VITALS — HEART RATE: 84 BPM | SYSTOLIC BLOOD PRESSURE: 130 MMHG | DIASTOLIC BLOOD PRESSURE: 60 MMHG

## 2023-04-20 DIAGNOSIS — I20.9 ANGINA PECTORIS, UNSPECIFIED (HCC): ICD-10-CM

## 2023-04-20 DIAGNOSIS — L91.8 SKIN TAG: ICD-10-CM

## 2023-04-20 DIAGNOSIS — E11.65 TYPE 2 DIABETES MELLITUS WITH HYPERGLYCEMIA, WITH LONG-TERM CURRENT USE OF INSULIN (HCC): Primary | ICD-10-CM

## 2023-04-20 DIAGNOSIS — F19.10 OTHER PSYCHOACTIVE SUBSTANCE ABUSE, UNCOMPLICATED (HCC): ICD-10-CM

## 2023-04-20 DIAGNOSIS — Z79.4 TYPE 2 DIABETES MELLITUS WITH HYPERGLYCEMIA, WITH LONG-TERM CURRENT USE OF INSULIN (HCC): Primary | ICD-10-CM

## 2023-04-20 DIAGNOSIS — E03.9 HYPOTHYROIDISM, UNSPECIFIED TYPE: ICD-10-CM

## 2023-04-20 PROBLEM — I25.119 ATHEROSCLEROTIC HEART DISEASE OF NATIVE CORONARY ARTERY WITH UNSPECIFIED ANGINA PECTORIS (HCC): Status: ACTIVE | Noted: 2023-04-20

## 2023-04-20 PROCEDURE — 3017F COLORECTAL CA SCREEN DOC REV: CPT | Performed by: INTERNAL MEDICINE

## 2023-04-20 PROCEDURE — 2022F DILAT RTA XM EVC RTNOPTHY: CPT | Performed by: INTERNAL MEDICINE

## 2023-04-20 PROCEDURE — 1036F TOBACCO NON-USER: CPT | Performed by: INTERNAL MEDICINE

## 2023-04-20 PROCEDURE — 3044F HG A1C LEVEL LT 7.0%: CPT | Performed by: INTERNAL MEDICINE

## 2023-04-20 PROCEDURE — G8417 CALC BMI ABV UP PARAM F/U: HCPCS | Performed by: INTERNAL MEDICINE

## 2023-04-20 PROCEDURE — G8427 DOCREV CUR MEDS BY ELIG CLIN: HCPCS | Performed by: INTERNAL MEDICINE

## 2023-04-20 PROCEDURE — 3078F DIAST BP <80 MM HG: CPT | Performed by: INTERNAL MEDICINE

## 2023-04-20 PROCEDURE — 3075F SYST BP GE 130 - 139MM HG: CPT | Performed by: INTERNAL MEDICINE

## 2023-04-20 PROCEDURE — 99214 OFFICE O/P EST MOD 30 MIN: CPT | Performed by: INTERNAL MEDICINE

## 2023-04-20 RX ORDER — FUROSEMIDE 20 MG/1
20 TABLET ORAL DAILY
Qty: 60 TABLET | Refills: 5 | Status: SHIPPED | OUTPATIENT
Start: 2023-04-20

## 2023-04-20 RX ORDER — LEVOTHYROXINE SODIUM 0.05 MG/1
TABLET ORAL
Qty: 90 TABLET | Refills: 1 | Status: SHIPPED | OUTPATIENT
Start: 2023-04-20

## 2023-04-20 RX ORDER — ATORVASTATIN CALCIUM 80 MG/1
80 TABLET, FILM COATED ORAL NIGHTLY
Qty: 30 TABLET | Refills: 5 | Status: SHIPPED | OUTPATIENT
Start: 2023-04-20

## 2023-04-20 RX ORDER — LISINOPRIL 5 MG/1
5 TABLET ORAL DAILY
Qty: 30 TABLET | Refills: 5 | Status: SHIPPED | OUTPATIENT
Start: 2023-04-20

## 2023-04-20 RX ORDER — DULAGLUTIDE 4.5 MG/.5ML
INJECTION, SOLUTION SUBCUTANEOUS
Qty: 2 ML | Refills: 5 | Status: SHIPPED | OUTPATIENT
Start: 2023-04-20

## 2023-04-20 NOTE — PROGRESS NOTES
speech, no focal findings or movement disorder noted, motor and sensory grossly normal bilaterally  Musculoskeletal - no joint tenderness, deformity or swelling  RLE AKA   Extremities - peripheral pulses normal, no pedal edema, no clubbing or cyanosis, Eugene's sign negative LLE,   RLE  AKA ( 2016 )   Prosthesis: No  Skin - normal coloration and turgor,  LLE had an ulcer, completed antibiotics. I have reviewed recent diagnostic testing including labs     Diagnosis Orders   1. Type 2 diabetes mellitus with hyperglycemia, with long-term current use of insulin ([facility] [address]        2. Hypothyroidism, unspecified type  levothyroxine (SYNTHROID) 50 MCG tablet      3. Other psychoactive substance abuse, uncomplicated ([facility] [address]        4. Angina pectoris, unspecified        5. Skin tag  KIA Salazar MD, Dermatology, [facility] [address]            Plan:    Orders Placed This Encounter   Procedures    KIA Salazar MD, Dermatology, [facility] [address]     Referral Priority:   Routine     Referral Type:   Eval and Treat     Referral Reason:   Specialty Services Required     Referred to Provider:   Vonda Cunningham MD     Requested Specialty:   Dermatology     Number of Visits Requested:   1         Outpatient Encounter Medications as of 4/20/2023   Medication Sig Dispense Refill    lisinopril (PRINIVIL;ZESTRIL) 5 MG tablet Take 1 tablet by mouth daily Start this med in two days from 2/4/23 30 tablet 5    furosemide (LASIX) 20 MG tablet Take 1 tablet by mouth daily 60 tablet 5    levothyroxine (SYNTHROID) 50 MCG tablet TAKE 1 TABLET BY MOUTH ONCE DAILY. 90 tablet 1    Dulaglutide (TRULICITY) 4.5 DU/9.9DQ SOPN INJECT 4.5 MG INTO THE SKIN ONCE A WEEK . 2 mL 5    empagliflozin (JARDIANCE) 25 MG tablet TAKE 1 TABLET BY MOUTH ONCE DAILY.  30 tablet 5    atorvastatin (LIPITOR) 80 MG tablet Take 1 tablet by mouth at bedtime 30 tablet 5    ticagrelor (BRILINTA) 90 MG TABS tablet Take 1 tablet by mouth 2 times daily 60 tablet 5    omeprazole (PRILOSEC) 40

## 2023-05-09 DIAGNOSIS — G62.9 NEUROPATHY: ICD-10-CM

## 2023-05-09 RX ORDER — GABAPENTIN 800 MG/1
800 TABLET ORAL 3 TIMES DAILY
Qty: 270 TABLET | Refills: 0 | Status: SHIPPED | OUTPATIENT
Start: 2023-05-09 | End: 2023-08-07

## 2023-05-22 RX ORDER — DULAGLUTIDE 4.5 MG/.5ML
INJECTION, SOLUTION SUBCUTANEOUS
Qty: 2 ML | Refills: 5 | OUTPATIENT
Start: 2023-05-22

## 2023-05-22 RX ORDER — OMEPRAZOLE 40 MG/1
CAPSULE, DELAYED RELEASE ORAL
Qty: 30 CAPSULE | Refills: 2 | OUTPATIENT
Start: 2023-05-22

## 2023-05-24 DIAGNOSIS — I20.9 ANGINA PECTORIS, UNSPECIFIED (HCC): ICD-10-CM

## 2023-05-25 RX ORDER — ISOSORBIDE MONONITRATE 30 MG/1
30 TABLET, EXTENDED RELEASE ORAL DAILY
Qty: 30 TABLET | Refills: 3 | Status: SHIPPED | OUTPATIENT
Start: 2023-05-25

## 2023-05-25 NOTE — ED NOTES
-- DO NOT REPLY / DO NOT REPLY ALL --  -- Message is from Engagement Center Operations (ECO) --    General Patient Message: patient will like a call back regarding getting her office visit notes from 5/8 fax to her disability insurance company the fax number is [phone]        Caller Information       Type Contact Phone/Fax    05/25/2023 10:45 AM CDT Phone (Incoming) Harmeet Tammy X (Self) [phone] (M)        Alternative phone number: none     Can a detailed message be left? Yes    Message Turnaround:     Is it Working Hours? Yes - Working Hours     [place]:    Please give this turnaround time to the caller:   \"This message will be sent to [state Provider's name]. The clinical team will fulfill your request as soon as they review your message.\"                 Patient resting in semi fowlers position, breathing easy and unlabored. VSS. Call light within reach.       Roverto Corado, RN  08/24/21 9684

## 2023-05-31 DIAGNOSIS — I20.9 ANGINA PECTORIS, UNSPECIFIED (HCC): ICD-10-CM

## 2023-06-14 DIAGNOSIS — E03.9 HYPOTHYROIDISM, UNSPECIFIED TYPE: ICD-10-CM

## 2023-06-14 RX ORDER — LEVOTHYROXINE SODIUM 0.05 MG/1
TABLET ORAL
Qty: 90 TABLET | Refills: 1 | OUTPATIENT
Start: 2023-06-14

## 2023-06-22 ENCOUNTER — OFFICE VISIT (OUTPATIENT)
Dept: INTERNAL MEDICINE CLINIC | Age: 53
End: 2023-06-22

## 2023-06-22 DIAGNOSIS — E11.65 TYPE 2 DIABETES MELLITUS WITH HYPERGLYCEMIA, WITH LONG-TERM CURRENT USE OF INSULIN (HCC): Primary | ICD-10-CM

## 2023-06-22 DIAGNOSIS — Z79.4 TYPE 2 DIABETES MELLITUS WITH HYPERGLYCEMIA, WITH LONG-TERM CURRENT USE OF INSULIN (HCC): Primary | ICD-10-CM

## 2023-06-22 LAB — HBA1C MFR BLD: 6.8 % (ref 4.3–5.7)

## 2023-06-22 RX ORDER — KETOCONAZOLE 20 MG/ML
SHAMPOO TOPICAL PRN
COMMUNITY
Start: 2023-05-04

## 2023-06-22 NOTE — PROGRESS NOTES
[facility]  [facility] [address] [facility], [address]  [phone] (phone)  [phone] (fax)    Patient ID: Lindsay Mays 1970  Referring Provider: Dr. Uma Crawford     Patient's name and  were verified. Subjective:    He presents for a follow-up diabetic visit. He has type 2 diabetes mellitus. Home regimen includes: Insulin, GLP-1 Agonist, and SGLT-2 inhibitors He is compliant some of the time. Assessment:     Lab Results   Component Value Date/Time    LABA1C 6.8 2023 09:41 AM    LABA1C 7.8 09/15/2022 09:06 AM    BUN 12 2023 09:18 AM    CREATININE 0.8 2023 09:18 AM     Vitals:    23 0744 23 0745   BP: (!) 101/58 (!) 100/58   Site: Right Upper Arm Left Upper Arm   Position: Sitting Sitting   Pulse: 93    Temp: 98.1 °F (36.7 °C)    Height: 5' 6\" (1.676 m)      Wt Readings from Last 3 Encounters:   23 220 lb (99.8 kg)   23 225 lb (102.1 kg)   23 225 lb (102.1 kg)     Ht Readings from Last 3 Encounters:   23 5' 6\" (1.676 m)   23 5' 6\" (1.676 m)   23 5' 6\" (1.676 m)       Diabetes Pharmacotherapy:  Toujeo 70-75 units in the morning and  70-90 units at night  Humalog 35 units bkfst, 35 units lunch,35 unit dinner  Trulicity 4.5 mg weekly; Monday  Jardiacne 25mg daily             Glucose Trends:   Glucose FBS today resulted at 163mg/dl  Current monitoring regimen: Dexcom CGM - checks 4+ times daily  Home blood sugar trends:    -V. High: 28%, High: 36%, Target: 35%, Low: 1%, V. Low: 0%   -Average Glucose: 219mg/dL;%time CGM active 86%              -4-5 days ago, glucose levels appear to have fallen into goal. Olivia Degroot reports this is because he quit drinking  Any episodes of hypoglycemia?  yes -   -Treats with glucose gel    Lifestyle Factors:   Previous visit with dietician: 2021  Current diet: B: oatmeal            L: assisted living meal steak/rice                       D: skips frozen meal. Don't eat after 6pm

## 2023-06-22 NOTE — PATIENT INSTRUCTIONS
Toujeo 70 units morning and night. Stick with the same dose every day            --unless you see a pattern of low blood sugars mor than 2-3 times  Humalog 35 units with meals plus the scale  151-200, 4 units  201-250, 8 units  251-300, 12 units  301-350, 16 units  351-400, 20 units  Above 400, call MD  -call Dr. Vera Gaines office to make appointment for eye check  Continue not drinking zero pop-this seems to have a good affect on              Your blood sugars!

## 2023-06-23 VITALS
SYSTOLIC BLOOD PRESSURE: 100 MMHG | TEMPERATURE: 98.1 F | DIASTOLIC BLOOD PRESSURE: 58 MMHG | BODY MASS INDEX: 35.51 KG/M2 | HEART RATE: 93 BPM | HEIGHT: 66 IN

## 2023-06-27 ENCOUNTER — OFFICE VISIT (OUTPATIENT)
Dept: INTERNAL MEDICINE CLINIC | Age: 53
End: 2023-06-27

## 2023-06-27 DIAGNOSIS — E11.65 TYPE 2 DIABETES MELLITUS WITH HYPERGLYCEMIA, WITH LONG-TERM CURRENT USE OF INSULIN (HCC): Primary | ICD-10-CM

## 2023-06-27 DIAGNOSIS — Z79.4 TYPE 2 DIABETES MELLITUS WITH HYPERGLYCEMIA, WITH LONG-TERM CURRENT USE OF INSULIN (HCC): Primary | ICD-10-CM

## 2023-07-06 ENCOUNTER — CARE COORDINATION (OUTPATIENT)
Dept: CARE COORDINATION | Age: 53
End: 2023-07-06

## 2023-07-06 NOTE — CARE COORDINATION
Attempted to reach Cathleen Sparks today for care coordination enrollment. No answer. Message left to return call.

## 2023-07-12 ENCOUNTER — TELEPHONE (OUTPATIENT)
Dept: INTERNAL MEDICINE CLINIC | Age: 53
End: 2023-07-12

## 2023-07-13 NOTE — TELEPHONE ENCOUNTER
Patient called on call provider tonight with complaints of diarrhea with smell of blood in it - black stools. He is having some epigastric discomfort. He denies recent antibiotics. He is complaining of dizziness. He has known lactose intolerance and will not give up dairy products but I am very concerned as he likely has blood in stools - suspect upper GI source. Advised him to go to ED now and he is agreeable. He is an insulin dependent DM - He has been having hypoglycemia at least 2x a day for the last week - now seeing Dr. Todd Harris for DM but has not contacted their office yet to discuss.

## 2023-07-18 ENCOUNTER — OFFICE VISIT (OUTPATIENT)
Dept: CARDIOLOGY CLINIC | Age: 53
End: 2023-07-18
Payer: MEDICARE

## 2023-07-18 VITALS
DIASTOLIC BLOOD PRESSURE: 72 MMHG | SYSTOLIC BLOOD PRESSURE: 107 MMHG | HEIGHT: 66 IN | BODY MASS INDEX: 35.51 KG/M2 | HEART RATE: 96 BPM

## 2023-07-18 DIAGNOSIS — E78.00 PURE HYPERCHOLESTEROLEMIA: ICD-10-CM

## 2023-07-18 DIAGNOSIS — R07.9 CHEST PAIN IN ADULT: ICD-10-CM

## 2023-07-18 DIAGNOSIS — I50.32 CHRONIC DIASTOLIC CONGESTIVE HEART FAILURE (HCC): ICD-10-CM

## 2023-07-18 DIAGNOSIS — I25.10 CORONARY ARTERY DISEASE INVOLVING NATIVE CORONARY ARTERY OF NATIVE HEART, UNSPECIFIED WHETHER ANGINA PRESENT: Primary | ICD-10-CM

## 2023-07-18 DIAGNOSIS — R42 VERTIGO: ICD-10-CM

## 2023-07-18 DIAGNOSIS — Z95.820 S/P ANGIOPLASTY WITH STENT: ICD-10-CM

## 2023-07-18 DIAGNOSIS — I10 ESSENTIAL HYPERTENSION: ICD-10-CM

## 2023-07-18 PROBLEM — I25.119 ATHEROSCLEROTIC HEART DISEASE OF NATIVE CORONARY ARTERY WITH UNSPECIFIED ANGINA PECTORIS (HCC): Status: RESOLVED | Noted: 2023-04-20 | Resolved: 2023-07-18

## 2023-07-18 PROBLEM — I20.9 ANGINA PECTORIS, UNSPECIFIED (HCC): Status: RESOLVED | Noted: 2023-04-20 | Resolved: 2023-07-18

## 2023-07-18 PROCEDURE — 3074F SYST BP LT 130 MM HG: CPT | Performed by: INTERNAL MEDICINE

## 2023-07-18 PROCEDURE — 3078F DIAST BP <80 MM HG: CPT | Performed by: INTERNAL MEDICINE

## 2023-07-18 PROCEDURE — 93000 ELECTROCARDIOGRAM COMPLETE: CPT | Performed by: INTERNAL MEDICINE

## 2023-07-18 PROCEDURE — 1036F TOBACCO NON-USER: CPT | Performed by: INTERNAL MEDICINE

## 2023-07-18 PROCEDURE — G8417 CALC BMI ABV UP PARAM F/U: HCPCS | Performed by: INTERNAL MEDICINE

## 2023-07-18 PROCEDURE — 3017F COLORECTAL CA SCREEN DOC REV: CPT | Performed by: INTERNAL MEDICINE

## 2023-07-18 PROCEDURE — G8427 DOCREV CUR MEDS BY ELIG CLIN: HCPCS | Performed by: INTERNAL MEDICINE

## 2023-07-18 PROCEDURE — 99214 OFFICE O/P EST MOD 30 MIN: CPT | Performed by: INTERNAL MEDICINE

## 2023-07-18 NOTE — PROGRESS NOTES
Chief Complaint   Patient presents with    Follow-up     4 month      [address] F/u  CONSULT DATE:  06/19/2020    REASON OF CONSULTATION:  Worsening of shortness of breath and  palpitation with history of intermittent chest pain in the last several  weeks. PROVIDER:     Bill Wu. Lazarus Estrada M.D.     [facility]:  11/24/2022     REASON FOR CONSULTATION:  Chest pain, elevated troponin in a 45year-old  gentleman, presented with recurrent chest pain over the last four to  five days. Last seen in office dec 2020  Later seen in hospital nov 2022          Patient here for a 4 month follow up    EKG done today    Had one episode of chest pain- tightness at rest with sob and sweat and resolve on its own after several minutes and did not seek NTG, 8/10. No cp other one yesterday in the 4 months    Complains of room spinning - vertigo- noted in sitting , supine as well  Pat wheelchair bound with RT AKA    Had cath and pci of the RCA nov 2022 and later  cath feb 2023 nonobstructive with patent stent     Denied sob, palpitation of edema    Occasional dizziness    RT AKA- due to diabetes      Small Pericardial effusion  noted on CT chest not confirmed on echo      Patient Active Problem List   Diagnosis    Status post below knee amputation of right lower extremity ([address])    Gait disturbance    Sebaceous cyst    Uncontrolled type 2 diabetes mellitus with hyperglycemia, with long-term current use of insulin ([facility])    Severe bipolar II disorder, recent episode major depressive, remission ([address])    Bipolar 1 disorder (HCC)    Hyponatremia    Normocytic anemia    Obesity (BMI 30-39. 9)    History of noncompliance with medical treatment    Essential hypertension    Tobacco dependence    Gastroesophageal reflux disease without esophagitis    History of marijuana use    Physical debility    Anemia    Electrolyte imbalance    Type 2 diabetes mellitus with hyperglycemia, with long-term current use of insulin ([facility])    Weakness

## 2023-07-19 ENCOUNTER — CARE COORDINATION (OUTPATIENT)
Dept: CARE COORDINATION | Age: 53
End: 2023-07-19

## 2023-07-24 ENCOUNTER — TELEPHONE (OUTPATIENT)
Dept: INTERNAL MEDICINE CLINIC | Age: 53
End: 2023-07-24

## 2023-07-24 NOTE — TELEPHONE ENCOUNTER
Patient stopped in office for [facility] download. He was requesting HbA1c though one was just ran 6/22. He states his Dexcom estimates his A1c at 6.0.

## 2023-07-27 ENCOUNTER — TELEPHONE (OUTPATIENT)
Dept: INTERNAL MEDICINE CLINIC | Age: 53
End: 2023-07-27

## 2023-07-27 ENCOUNTER — CARE COORDINATION (OUTPATIENT)
Dept: CARE COORDINATION | Age: 53
End: 2023-07-27

## 2023-07-27 DIAGNOSIS — Z89.611 HISTORY OF ABOVE-KNEE AMPUTATION OF RIGHT LOWER EXTREMITY (HCC): Primary | ICD-10-CM

## 2023-07-27 NOTE — TELEPHONE ENCOUNTER
Patient is requesting a outpatient physical therapy referral. He has his prosthetic leg and would like to begin therapy with it. Please advise.

## 2023-07-27 NOTE — CARE COORDINATION
Attempted to reach Otto Oneal today for care coordination f/u. No answer. Message left requesting return call.

## 2023-07-28 ENCOUNTER — HOSPITAL ENCOUNTER (OUTPATIENT)
Dept: NON INVASIVE DIAGNOSTICS | Age: 53
Discharge: HOME | End: 2023-07-28
Attending: INTERNAL MEDICINE

## 2023-08-01 ENCOUNTER — TELEPHONE (OUTPATIENT)
Dept: CARDIOLOGY CLINIC | Age: 53
End: 2023-08-01

## 2023-08-01 NOTE — TELEPHONE ENCOUNTER
Per stress lab, pt arrived for stress test, prepped then pt stated not feeling well, did not want to proceed with test  Stress test cancelled, pt stated would call to reschedule

## 2023-08-02 DIAGNOSIS — G62.9 NEUROPATHY: ICD-10-CM

## 2023-08-02 RX ORDER — GABAPENTIN 800 MG/1
TABLET ORAL
Qty: 270 TABLET | Refills: 0 | Status: SHIPPED | OUTPATIENT
Start: 2023-08-02 | End: 2023-10-31

## 2023-08-03 ENCOUNTER — CARE COORDINATION (OUTPATIENT)
Dept: CARE COORDINATION | Age: 53
End: 2023-08-03

## 2023-08-03 NOTE — CARE COORDINATION
Attempted to reach patient for continued Care Coordination follow up and education. Patient was unavailable at the time of my call, and a generic voicemail message was left asking patient to return my call at [phone].

## 2023-08-09 ENCOUNTER — HOSPITAL ENCOUNTER (OUTPATIENT)
Dept: PHYSICAL THERAPY | Age: 53
Setting detail: THERAPIES SERIES
Discharge: HOME | End: 2023-08-09
Attending: INTERNAL MEDICINE
Payer: MEDICARE

## 2023-08-09 PROCEDURE — 97110 THERAPEUTIC EXERCISES: CPT

## 2023-08-09 PROCEDURE — 97161 PT EVAL LOW COMPLEX 20 MIN: CPT

## 2023-08-09 NOTE — PROGRESS NOTES
** PLEASE SIGN, DATE AND TIME CERTIFICATION BELOW AND RETURN TO [facility] OUTPATIENT REHABILITATION (FAX #: [phone]). ATTEST/CO-SIGN IF ACCESSING VIA [employer]. THANK YOU.**    I certify that I have examined the patient below and determined that Physical Medicine and Rehabilitation service is necessary and that I approve the established plan of care for up to 90 days or as specifically noted. Attestation, signature or co-signature of physician indicates approval of certification requirements.    ________________________ ____________ __________  Physician Signature   Date   Time        [facility]  PHYSICAL THERAPY  [x] EVALUATION  [] DAILY NOTE (LAND) [] DAILY NOTE (AQUATIC ) [] PROGRESS NOTE [] DISCHARGE NOTE    [x] [address] [facility] [address] [facility] [place]   [] [facility]    [] [facility]   [] Jacob Maria    Date: 2023  Patient Name:  Ascencion Haskins  : 1970  MRN: [id number]  CSN: [id number]    Referring Practitioner Kj Greene MD   Diagnosis History of above-knee amputation of right lower extremity ([address] [facility]) [Z89.611]    Treatment Diagnosis Deconditioning s/p R AKA   Date of Evaluation 23    Additional Pertinent History DM. Heart disease. Atherosclerotic heart. CHF. COPD. Thyroid disease. Sleep apnea. Bipolar. Right shoulder surgery per an abscess. Gallbladder removed. Left DROP FOOT. Functional Outcome Measure Used Colen Sicard   Functional Outcome Score 0 = limited household (23)       Insurance: Primary: Payor: [id number] [facility] [address] /  /  / ,   Secondary: MEDICAID [place]   Authorization Information: PRE CERTIFICATION REQUIRED: NO.  INSURANCE THERAPY BENEFIT:   Ave . AQUATIC THERAPY COVERED: YES. MODALITIES COVERED:  YES. TELEHEALTH COVERED:   REFERENCE NUMBER: [id number].    Visit # 1, 1/10 for progress note   Visits Allowed: Unlimited per medical necessity   Recertification Date:    Physician

## 2023-08-14 RX ORDER — SODIUM CHLORIDE 9 MG/ML
INJECTION, SOLUTION INTRAVENOUS CONTINUOUS
Status: DISCONTINUED | OUTPATIENT
Start: 2023-08-14 | End: 2023-08-18 | Stop reason: HOSPADM

## 2023-08-14 RX ORDER — SODIUM CHLORIDE 0.9 % (FLUSH) 0.9 %
5-40 SYRINGE (ML) INJECTION EVERY 12 HOURS SCHEDULED
Status: DISCONTINUED | OUTPATIENT
Start: 2023-08-14 | End: 2023-08-18 | Stop reason: HOSPADM

## 2023-08-14 RX ORDER — SODIUM CHLORIDE 0.9 % (FLUSH) 0.9 %
5-40 SYRINGE (ML) INJECTION PRN
Status: DISCONTINUED | OUTPATIENT
Start: 2023-08-14 | End: 2023-08-18 | Stop reason: HOSPADM

## 2023-08-14 RX ORDER — SODIUM CHLORIDE 9 MG/ML
25 INJECTION, SOLUTION INTRAVENOUS PRN
Status: DISCONTINUED | OUTPATIENT
Start: 2023-08-14 | End: 2023-08-18 | Stop reason: HOSPADM

## 2023-08-16 ENCOUNTER — CARE COORDINATION (OUTPATIENT)
Dept: CARE COORDINATION | Age: 53
End: 2023-08-16

## 2023-08-16 NOTE — CARE COORDINATION
Attempted to reach Quita Chapa today for care coordination f/u. No answer. Message left to return call.

## 2023-08-18 ENCOUNTER — HOSPITAL ENCOUNTER (OUTPATIENT)
Age: 53
Setting detail: OUTPATIENT SURGERY
Discharge: HOME | End: 2023-08-18
Attending: INTERNAL MEDICINE | Admitting: INTERNAL MEDICINE
Payer: MEDICARE

## 2023-08-18 ENCOUNTER — ANESTHESIA EVENT (OUTPATIENT)
Dept: ENDOSCOPY | Age: 53
End: 2023-08-18
Payer: MEDICARE

## 2023-08-18 ENCOUNTER — ANESTHESIA (OUTPATIENT)
Dept: ENDOSCOPY | Age: 53
End: 2023-08-18
Payer: MEDICARE

## 2023-08-18 VITALS
HEART RATE: 77 BPM | TEMPERATURE: 97.4 F | RESPIRATION RATE: 16 BRPM | DIASTOLIC BLOOD PRESSURE: 70 MMHG | OXYGEN SATURATION: 96 % | BODY MASS INDEX: 36.16 KG/M2 | WEIGHT: 225 LBS | SYSTOLIC BLOOD PRESSURE: 102 MMHG | HEIGHT: 66 IN

## 2023-08-18 PROCEDURE — 6360000002 HC RX W HCPCS: Performed by: NURSE ANESTHETIST, CERTIFIED REGISTERED

## 2023-08-18 PROCEDURE — 2709999900 HC NON-CHARGEABLE SUPPLY: Performed by: INTERNAL MEDICINE

## 2023-08-18 PROCEDURE — 7100000010 HC PHASE II RECOVERY - FIRST 15 MIN: Performed by: INTERNAL MEDICINE

## 2023-08-18 PROCEDURE — 2580000003 HC RX 258: Performed by: INTERNAL MEDICINE

## 2023-08-18 PROCEDURE — 2500000003 HC RX 250 WO HCPCS: Performed by: NURSE ANESTHETIST, CERTIFIED REGISTERED

## 2023-08-18 PROCEDURE — 3700000001 HC ADD 15 MINUTES (ANESTHESIA): Performed by: INTERNAL MEDICINE

## 2023-08-18 PROCEDURE — 3700000000 HC ANESTHESIA ATTENDED CARE: Performed by: INTERNAL MEDICINE

## 2023-08-18 PROCEDURE — 88305 TISSUE EXAM BY PATHOLOGIST: CPT

## 2023-08-18 PROCEDURE — 3609027000 HC COLONOSCOPY: Performed by: INTERNAL MEDICINE

## 2023-08-18 PROCEDURE — 3609012400 HC EGD TRANSORAL BIOPSY SINGLE/MULTIPLE: Performed by: INTERNAL MEDICINE

## 2023-08-18 PROCEDURE — 2580000003 HC RX 258: Performed by: NURSE ANESTHETIST, CERTIFIED REGISTERED

## 2023-08-18 RX ORDER — LIDOCAINE HYDROCHLORIDE 20 MG/ML
INJECTION, SOLUTION INFILTRATION; PERINEURAL PRN
Status: DISCONTINUED | OUTPATIENT
Start: 2023-08-18 | End: 2023-08-18 | Stop reason: SDUPTHER

## 2023-08-18 RX ORDER — SODIUM CHLORIDE 9 MG/ML
INJECTION, SOLUTION INTRAVENOUS CONTINUOUS PRN
Status: DISCONTINUED | OUTPATIENT
Start: 2023-08-18 | End: 2023-08-18 | Stop reason: SDUPTHER

## 2023-08-18 RX ORDER — PROPOFOL 10 MG/ML
INJECTION, EMULSION INTRAVENOUS PRN
Status: DISCONTINUED | OUTPATIENT
Start: 2023-08-18 | End: 2023-08-18 | Stop reason: SDUPTHER

## 2023-08-18 RX ADMIN — LIDOCAINE HYDROCHLORIDE 140 MG: 20 INJECTION, SOLUTION INFILTRATION; PERINEURAL at 07:24

## 2023-08-18 RX ADMIN — SODIUM CHLORIDE: 9 INJECTION, SOLUTION INTRAVENOUS at 07:23

## 2023-08-18 RX ADMIN — SODIUM CHLORIDE: 9 INJECTION, SOLUTION INTRAVENOUS at 06:49

## 2023-08-18 RX ADMIN — PROPOFOL 50 MG: 10 INJECTION, EMULSION INTRAVENOUS at 07:24

## 2023-08-18 RX ADMIN — PROPOFOL 50 MG: 10 INJECTION, EMULSION INTRAVENOUS at 07:30

## 2023-08-18 RX ADMIN — PROPOFOL 50 MG: 10 INJECTION, EMULSION INTRAVENOUS at 07:27

## 2023-08-18 ASSESSMENT — PAIN - FUNCTIONAL ASSESSMENT: PAIN_FUNCTIONAL_ASSESSMENT: NONE - DENIES PAIN

## 2023-08-18 NOTE — PROGRESS NOTES
Admitted to [facility] for EGD and colonoscopy with Dr. Edilberto Leal. Consents signed.  Call light within reach

## 2023-08-18 NOTE — PROGRESS NOTES
EGD completed, tolerated well. Biopsies taken, removed with cold forceps. 1 jars labeled and sent to lab. Photos taken. Scope  used. Colonoscopy aborted per physician due to poor prep.

## 2023-08-18 NOTE — BRIEF OP NOTE
Brief Postoperative Note      Patient: Jaren Cleveland  YOB: 1970  MRN: [id number]    Date of Procedure: 8/18/2023    Pre-Op Diagnosis Codes:     * Esophageal dysphagia [R13.19]    Post-Op Diagnosis:  egd normal biopsies obtained from mid and proximal esophagus, incomplete colon due to poor preparation. Procedure(s):  COLONOSCOPY  EGD BIOPSY    Surgeon(s):  Marely Smiley MD    Assistant:  * No surgical staff found *    Anesthesia: Monitor Anesthesia Care    Estimated Blood Loss (mL): Minimal    Complications: None    Specimens:   ID Type Source Tests Collected by Time Destination   A : Mid and approximal esophagus r/o esinophilic esophagitis Tissue Esophagus SURGICAL PATHOLOGY Marely Smiley MD 8/18/2023 0730        Implants:  * No implants in log *      Drains: * No LDAs found *    Findings: egd normal biopsies obtained from mid and proximal esophagus, incomplete colon due to poor preparation.       Electronically signed by Marely Smiley MD on 8/18/2023 at 7:47 AM

## 2023-08-18 NOTE — DISCHARGE INSTRUCTIONS
ANTI-REFLUX INSTRUCTIONS  FIBER SUPPLEMENTS  RESCHEDULE COLONOSCOPY WITH 2 DAY PREPARATION  CALL OUR OFFICE IN < 2 WKS FOR BIOPSY RESULTS.

## 2023-08-18 NOTE — PROGRESS NOTES
Patient brought to phase 2, VSS  Dr Carmen Miranda in updating patient and friend  D/c instructions reinforced.

## 2023-08-18 NOTE — OP NOTE
[place] [address]                                OPERATIVE REPORT    PATIENT NAME: Tatyana Tran                     :        1970  MED REC NO:   [id number]                           ROOM:  ACCOUNT NO:   [de-identified]                           ADMIT DATE: 2023  PROVIDER:     RICK Verma Howard OF PROCEDURE:  2023    PROCEDURE:  Esophagogastroduodenoscopy and attempted colonoscopy. INDICATION:  A 52year-old pleasant male who complains of intermittent  difficulty swallowing, diarrhea, lower abdominal pain. He is undergoing  further evaluation. Please see my brief history for details and for  physical examination. ASA CLASSIFICATION:  As per Anesthesia. Please see Anesthesia note for  details. INSTRUMENT:  PCF-H190AL pediatric colonoscope. PHOTOGRAPHS:  Yes. BIOPSIES:  Yes. ESTIMATED BLOOD LOSS:  Less than 10 mL. Bowel prep one. CONSENT:  Procedure indications and complications including but not  limited to perforation, bleeding, infection, adverse reaction to  medicine, very slight chance of missing significant lesions discussed  with the patient and the patient expressed his understanding and a  written consent was obtained. DESCRIPTION OF PROCEDURE:  The patient was placed in the left lateral  decubitus position in the endo room #11. The patient was placed on  appropriate monitoring of vitals including blood pressure, heart rate  and pulse ox. ESOPHAGOGASTRODUODENOSCOPY REPORT:  Oropharynx was sprayed with  Cetacaine spray and bite block was placed between maxilla and mandible. After the adequate total intravenous anesthesia was given by Anesthesia,  the PCF-H190AL pediatric colonoscope was inserted into the oropharynx  and the esophagus was intubated under direct vision. The scope was  advanced down through the stomach into second portion of duodenum.   On  careful

## 2023-08-18 NOTE — H&P
[place] [address]                       PREOPERATIVE HISTORY AND PHYSICAL    PATIENT NAME: Arabella Simpson                     :        1970  MED REC NO:   [id number]                           ROOM:  ACCOUNT NO:   [de-identified]                           ADMIT DATE: 2023  PROVIDER:     RICK Wu OF PROCEDURE:  2023    PROCEDURE:  Esophagogastroduodenoscopy and colonoscopy. INDICATION:  The patient is a 53-year-old pleasant male who complains of  dysphagia and intermittent dysphagia of solids, denied any dysphagia for  liquids, diarrhea. Complains of lower abdominal pain, cramping type of  pain, melena, and he is undergoing further evaluation. PAST MEDICAL HISTORY:  Atherosclerotic heart disease, bipolar disorder,  benign prostate hypertrophy, chronic diastolic congestive heart failure,  chronic obstructive pulmonary disease, diabetes mellitus type 2,  peripheral neuropathy following nephropathy, ulcer of the right foot  associated with type 2 diabetes mellitus, gastroesophageal reflux  disease, hammertoe of the left foot, heart murmur, history of above-knee  amputation of the right lower extremity, hyperlipidemia, macular edema,  MRSA, hypothyroidism, sleep apnea. PAST SURGICAL HISTORY:  Abdominal surgery, abscess drainage, amputation  above the knee on the right lower extremity, cholecystectomy,  cystoscopy, dilation of the esophagus, foot debridement, gastrocnemius  recession, incision and drainage, right below-knee amputation, toe  amputation, EGD, wisdom tooth extraction. MEDICATIONS:  Reviewed. PHYSICAL EXAMINATION:  VITAL SIGNS:  Temperature 98, pulse 93, respiratory rate 18, blood  pressure 115/78. HEART:  Regular. Normal S1 and S2. No S3.  LUNGS:  Equal to expansion on inspection. [place] air entry bilaterally. ABDOMEN:  Soft. Normoactive bowel sounds. Nontender.   Not distended. IMPRESSION:  A 52year-old pleasant male, difficulty swallowing, nausea,  lower abdominal pain, diarrhea, melenic stools. He is undergoing  further evaluation. RECOMMENDATIONS:  My recommendations keep the patient nothing by mouth,  proceed with EGD, possible dilation and colonoscopy. I have discussed  with the patient the regarding the EGD, dilation and colonoscopy, its  indications and complications including but not limited to perforation,  bleeding, infection, reaction to medicine, very slight chance of missing  significant lesions. The patient expressed his understanding. Further  plans pending the above test results.         Alok Bravo M.D.    D: 08/18/2023 7:19:51       T: 08/18/2023 7:22:15     [email or web address]  Job#: [id number]     Doc#: [id number]    CC:

## 2023-08-18 NOTE — ANESTHESIA POSTPROCEDURE EVALUATION
Department of Anesthesiology  Postprocedure Note    Patient: Merna Batista  MRN: [id number]  YOB: 1970  Date of evaluation: 8/18/2023      Procedure Summary     Date: 08/18/23 Room / Location: [address] [place] [address] [facility] / [address] [place] [address]    Anesthesia Start: 5188 Anesthesia Stop: Cam Dross    Procedures:       COLONOSCOPY      EGD BIOPSY Diagnosis:       Esophageal dysphagia      (Esophageal dysphagia [R13.19])    Surgeons: Iris Irwin MD Responsible Provider: Blas Adams MD    Anesthesia Type: MAC ASA Status: 3          Anesthesia Type: MAC    Everette Phase I: Everette Score: 10    Everette Phase II:        Anesthesia Post Evaluation    Patient location during evaluation: bedside  Patient participation: complete - patient participated  Level of consciousness: awake  Pain score: 0  Airway patency: patent  Nausea & Vomiting: no nausea and no vomiting  Complications: no  Cardiovascular status: hemodynamically stable  Respiratory status: acceptable  Hydration status: stable  Pain management: adequate

## 2023-08-23 ENCOUNTER — APPOINTMENT (OUTPATIENT)
Dept: PHYSICAL THERAPY | Age: 53
End: 2023-08-23
Attending: INTERNAL MEDICINE
Payer: MEDICARE

## 2023-08-24 ENCOUNTER — CARE COORDINATION (OUTPATIENT)
Dept: CARE COORDINATION | Age: 53
End: 2023-08-24

## 2023-08-24 DIAGNOSIS — I20.9 ANGINA PECTORIS, UNSPECIFIED (HCC): ICD-10-CM

## 2023-08-24 RX ORDER — ISOSORBIDE MONONITRATE 30 MG/1
TABLET, EXTENDED RELEASE ORAL
Qty: 30 TABLET | Refills: 12 | Status: SHIPPED | OUTPATIENT
Start: 2023-08-24

## 2023-08-24 NOTE — CARE COORDINATION
Attempted to reach [facility] today for care coordination f/u. No answer. Care coordination has not been able to reach pt last 5 outreaches. Will d/c from care coordination at this time d/t unable to reach.

## 2023-08-25 ENCOUNTER — HOSPITAL ENCOUNTER (OUTPATIENT)
Dept: PHYSICAL THERAPY | Age: 53
Setting detail: THERAPIES SERIES
End: 2023-08-25
Attending: INTERNAL MEDICINE
Payer: MEDICARE

## 2023-08-27 ENCOUNTER — HOSPITAL ENCOUNTER (EMERGENCY)
Age: 53
Discharge: HOME | End: 2023-08-27
Payer: MEDICARE

## 2023-08-27 VITALS
TEMPERATURE: 98.3 F | SYSTOLIC BLOOD PRESSURE: 135 MMHG | DIASTOLIC BLOOD PRESSURE: 65 MMHG | BODY MASS INDEX: 36.41 KG/M2 | HEART RATE: 99 BPM | WEIGHT: 225.6 LBS | RESPIRATION RATE: 16 BRPM | OXYGEN SATURATION: 98 %

## 2023-08-27 DIAGNOSIS — K52.9 CHRONIC DIARRHEA: Primary | ICD-10-CM

## 2023-08-27 LAB
ALBUMIN SERPL BCG-MCNC: 3.8 G/DL (ref 3.5–5.1)
ALP SERPL-CCNC: 71 U/L (ref 38–126)
ALT SERPL W/O P-5'-P-CCNC: 13 U/L (ref 11–66)
ANION GAP SERPL CALC-SCNC: 12 MEQ/L (ref 8–16)
AST SERPL-CCNC: 18 U/L (ref 5–40)
BASOPHILS ABSOLUTE: 0.1 THOU/MM3 (ref 0–0.1)
BASOPHILS NFR BLD AUTO: 0.7 %
BILIRUB CONJ SERPL-MCNC: < 0.2 MG/DL (ref 0–0.3)
BILIRUB SERPL-MCNC: 0.4 MG/DL (ref 0.3–1.2)
BUN SERPL-MCNC: 15 MG/DL (ref 7–22)
CALCIUM SERPL-MCNC: 8.9 MG/DL (ref 8.5–10.5)
CHLORIDE SERPL-SCNC: 99 MEQ/L (ref 98–111)
CO2 SERPL-SCNC: 23 MEQ/L (ref 23–33)
CREAT SERPL-MCNC: 0.7 MG/DL (ref 0.4–1.2)
DEPRECATED RDW RBC AUTO: 49.9 FL (ref 35–45)
EOSINOPHIL NFR BLD AUTO: 3.2 %
EOSINOPHILS ABSOLUTE: 0.4 THOU/MM3 (ref 0–0.4)
ERYTHROCYTE [DISTWIDTH] IN BLOOD BY AUTOMATED COUNT: 14.4 % (ref 11.5–14.5)
GFR SERPL CREATININE-BSD FRML MDRD: > 60 ML/MIN/1.73M2
GLUCOSE BLD STRIP.AUTO-MCNC: 152 MG/DL (ref 70–108)
GLUCOSE SERPL-MCNC: 274 MG/DL (ref 70–108)
HCT VFR BLD AUTO: 46.9 % (ref 42–52)
HGB BLD-MCNC: 15.8 GM/DL (ref 14–18)
IMM GRANULOCYTES # BLD AUTO: 0.08 THOU/MM3 (ref 0–0.07)
IMM GRANULOCYTES NFR BLD AUTO: 0.7 %
LIPASE SERPL-CCNC: 63.9 U/L (ref 5.6–51.3)
LYMPHOCYTES ABSOLUTE: 2 THOU/MM3 (ref 1–4.8)
LYMPHOCYTES NFR BLD AUTO: 18.1 %
MAGNESIUM SERPL-MCNC: 2.1 MG/DL (ref 1.6–2.4)
MCH RBC QN AUTO: 32 PG (ref 26–33)
MCHC RBC AUTO-ENTMCNC: 33.7 GM/DL (ref 32.2–35.5)
MCV RBC AUTO: 95.1 FL (ref 80–94)
MONOCYTES ABSOLUTE: 0.7 THOU/MM3 (ref 0.4–1.3)
MONOCYTES NFR BLD AUTO: 6 %
NEUTROPHILS NFR BLD AUTO: 71.3 %
NRBC BLD AUTO-RTO: 0 /100 WBC
OSMOLALITY SERPL CALC.SUM OF ELEC: 278.8 MOSMOL/KG (ref 275–300)
PLATELET # BLD AUTO: 216 THOU/MM3 (ref 130–400)
PMV BLD AUTO: 9.5 FL (ref 9.4–12.4)
POTASSIUM SERPL-SCNC: 4.5 MEQ/L (ref 3.5–5.2)
PROT SERPL-MCNC: 7.5 G/DL (ref 6.1–8)
RBC # BLD AUTO: 4.93 MILL/MM3 (ref 4.7–6.1)
SEGMENTED NEUTROPHILS ABSOLUTE COUNT: 8 THOU/MM3 (ref 1.8–7.7)
SODIUM SERPL-SCNC: 134 MEQ/L (ref 135–145)
WBC # BLD AUTO: 11.2 THOU/MM3 (ref 4.8–10.8)

## 2023-08-27 PROCEDURE — 82948 REAGENT STRIP/BLOOD GLUCOSE: CPT

## 2023-08-27 PROCEDURE — 82248 BILIRUBIN DIRECT: CPT

## 2023-08-27 PROCEDURE — 36415 COLL VENOUS BLD VENIPUNCTURE: CPT

## 2023-08-27 PROCEDURE — 85025 COMPLETE CBC W/AUTO DIFF WBC: CPT

## 2023-08-27 PROCEDURE — 83690 ASSAY OF LIPASE: CPT

## 2023-08-27 PROCEDURE — 80053 COMPREHEN METABOLIC PANEL: CPT

## 2023-08-27 PROCEDURE — 99283 EMERGENCY DEPT VISIT LOW MDM: CPT

## 2023-08-27 PROCEDURE — 83735 ASSAY OF MAGNESIUM: CPT

## 2023-08-27 PROCEDURE — 6370000000 HC RX 637 (ALT 250 FOR IP): Performed by: NURSE PRACTITIONER

## 2023-08-27 RX ORDER — DIPHENOXYLATE HYDROCHLORIDE AND ATROPINE SULFATE 2.5; .025 MG/1; MG/1
1 TABLET ORAL NIGHTLY PRN
Qty: 10 TABLET | Refills: 0 | Status: SHIPPED | OUTPATIENT
Start: 2023-08-27 | End: 2023-09-06

## 2023-08-27 RX ORDER — DIPHENOXYLATE HYDROCHLORIDE AND ATROPINE SULFATE 2.5; .025 MG/1; MG/1
1 TABLET ORAL ONCE
Status: COMPLETED | OUTPATIENT
Start: 2023-08-27 | End: 2023-08-27

## 2023-08-27 RX ADMIN — DIPHENOXYLATE HYDROCHLORIDE AND ATROPINE SULFATE 1 TABLET: 2.5; .025 TABLET ORAL at 11:19

## 2023-08-27 ASSESSMENT — PAIN - FUNCTIONAL ASSESSMENT: PAIN_FUNCTIONAL_ASSESSMENT: NONE - DENIES PAIN

## 2023-08-27 NOTE — ED PROVIDER NOTES
[facility] Emergency [address]       Chief Complaint   Patient presents with    Diarrhea    Other     Low blood sugar at night       Nurses Notes reviewed and I agree except as noted in the HPI. HISTORY OF PRESENT ILLNESS   Obie Miramontes is a 52 y.o. male who presents to the ED for evaluation of diarrhea and low blood sugars. He has a hx of right BKA, DM2, and previous GI bleeds. He states that the last 2 nights his dexcom has been reading his glucose as 53 and 60 with readings in the 130s during the day. He has had this issue in the past and was being evaluated by his endocrinologist. He has been in contact with his endocrinologist about both issues. He decreased his evening dose of insulin. This has not fixed the low evening glucose. His endocrinologist scheduled him for a colonoscopy and EGD on 8/18. Colonoscopy was canceled due to excessive stool in the colon. EGD had biopsies taken that were determined to be normal. Colonoscopy has been rescheduled for October. He presents today because imodium is not slowing the diarrhea and his stool is black and smells of blood. He says that he has diarrhea after every meal. He has not been taking any Pepto. Denies abdominal pain, urinary retention, urinary frequency, SOB, nausea, and vomiting. Experienced previously: yes    HPI was provided by the patient. PAST MEDICAL HISTORY     Past Medical History:   Diagnosis Date    Atherosclerotic heart disease of native coronary artery with unspecified angina pectoris 1/18/2022    Bipolar 2 disorder (HCC)     previously followed with Dr. Julio Cesar Dunaway and Iva Peraza in [place]    BPH (benign prostatic hyperplasia)     Chronic diastolic congestive heart failure ([address]) 3/1/2023    COPD (chronic obstructive pulmonary disease) ([address])     Diabetes mellitus type 2, uncontrolled     HgbA1c on 4/2/2019 was 9.1.     Diabetic peripheral neuropathy (HCC)     Diabetic polyneuropathy ([address])

## 2023-08-27 NOTE — ED NOTES
Pt resting on cot, watching television with call light in reach. Updated on poc. Medicated per MAR. No concerns voiced.      Nathalia Herrera RN  08/27/23 3335

## 2023-08-27 NOTE — ED NOTES
Pt to ER with concerns of his blood sugar dropping at night the past 2 night. Pt reports each time his dexacom woke him up, the first night being in the 60's and last night being in the 50's. States he would eat something to bring his sugar up, but then would end up immediately in the bathroom for a couple hours with diarrhea. Reports he has had diarrhea for the past couple days. Denies eating any less due to the diarrhea. Pt reports this diarrhea to be a darker/black in color. Glucose 152 via ER glucometer, astrid reads 160.       Dinesh Phoenix RN  08/27/23 1016

## 2023-08-28 ENCOUNTER — TELEPHONE (OUTPATIENT)
Dept: INTERNAL MEDICINE CLINIC | Age: 53
End: 2023-08-28

## 2023-08-29 ENCOUNTER — APPOINTMENT (OUTPATIENT)
Dept: PHYSICAL THERAPY | Age: 53
End: 2023-08-29
Attending: INTERNAL MEDICINE
Payer: MEDICARE

## 2023-08-31 ENCOUNTER — TELEPHONE (OUTPATIENT)
Dept: INTERNAL MEDICINE CLINIC | Age: 53
End: 2023-08-31

## 2023-08-31 DIAGNOSIS — Z89.611 HISTORY OF ABOVE-KNEE AMPUTATION OF RIGHT LOWER EXTREMITY (HCC): Primary | ICD-10-CM

## 2023-08-31 NOTE — TELEPHONE ENCOUNTER
Patient called and left message asking if you would order him physical therapy at [facility] . I left message to call the office with more details . Patient called back and left message that he needs the PT to strengthen his leg .

## 2023-09-02 ENCOUNTER — HOSPITAL ENCOUNTER (EMERGENCY)
Age: 53
Discharge: HOME | End: 2023-09-02
Attending: EMERGENCY MEDICINE
Payer: MEDICARE

## 2023-09-02 ENCOUNTER — APPOINTMENT (OUTPATIENT)
Dept: GENERAL RADIOLOGY | Age: 53
End: 2023-09-02
Payer: MEDICARE

## 2023-09-02 VITALS
SYSTOLIC BLOOD PRESSURE: 145 MMHG | RESPIRATION RATE: 17 BRPM | DIASTOLIC BLOOD PRESSURE: 74 MMHG | HEART RATE: 96 BPM | TEMPERATURE: 98 F | OXYGEN SATURATION: 100 %

## 2023-09-02 DIAGNOSIS — M10.00 ACUTE IDIOPATHIC GOUT, UNSPECIFIED SITE: Primary | ICD-10-CM

## 2023-09-02 DIAGNOSIS — M79.89 SWELLING OF RIGHT MIDDLE FINGER: ICD-10-CM

## 2023-09-02 LAB
ANION GAP SERPL CALC-SCNC: 12 MEQ/L (ref 8–16)
BASOPHILS ABSOLUTE: 0 THOU/MM3 (ref 0–0.1)
BASOPHILS NFR BLD AUTO: 0.4 %
BUN SERPL-MCNC: 12 MG/DL (ref 7–22)
CALCIUM SERPL-MCNC: 9.3 MG/DL (ref 8.5–10.5)
CHLORIDE SERPL-SCNC: 101 MEQ/L (ref 98–111)
CO2 SERPL-SCNC: 23 MEQ/L (ref 23–33)
CREAT SERPL-MCNC: 0.8 MG/DL (ref 0.4–1.2)
DEPRECATED RDW RBC AUTO: 47.2 FL (ref 35–45)
EOSINOPHIL NFR BLD AUTO: 3.9 %
EOSINOPHILS ABSOLUTE: 0.5 THOU/MM3 (ref 0–0.4)
ERYTHROCYTE [DISTWIDTH] IN BLOOD BY AUTOMATED COUNT: 13.7 % (ref 11.5–14.5)
GFR SERPL CREATININE-BSD FRML MDRD: > 60 ML/MIN/1.73M2
GLUCOSE SERPL-MCNC: 122 MG/DL (ref 70–108)
HCT VFR BLD AUTO: 44.6 % (ref 42–52)
HGB BLD-MCNC: 15.2 GM/DL (ref 14–18)
IMM GRANULOCYTES # BLD AUTO: 0.05 THOU/MM3 (ref 0–0.07)
IMM GRANULOCYTES NFR BLD AUTO: 0.4 %
LYMPHOCYTES ABSOLUTE: 2.7 THOU/MM3 (ref 1–4.8)
LYMPHOCYTES NFR BLD AUTO: 22.4 %
MCH RBC QN AUTO: 31.9 PG (ref 26–33)
MCHC RBC AUTO-ENTMCNC: 34.1 GM/DL (ref 32.2–35.5)
MCV RBC AUTO: 93.5 FL (ref 80–94)
MONOCYTES ABSOLUTE: 0.9 THOU/MM3 (ref 0.4–1.3)
MONOCYTES NFR BLD AUTO: 7.5 %
NEUTROPHILS NFR BLD AUTO: 65.4 %
NRBC BLD AUTO-RTO: 0 /100 WBC
OSMOLALITY SERPL CALC.SUM OF ELEC: 273 MOSMOL/KG (ref 275–300)
PLATELET # BLD AUTO: 217 THOU/MM3 (ref 130–400)
PMV BLD AUTO: 9.5 FL (ref 9.4–12.4)
POTASSIUM SERPL-SCNC: 3.7 MEQ/L (ref 3.5–5.2)
RBC # BLD AUTO: 4.77 MILL/MM3 (ref 4.7–6.1)
SEGMENTED NEUTROPHILS ABSOLUTE COUNT: 7.8 THOU/MM3 (ref 1.8–7.7)
SODIUM SERPL-SCNC: 136 MEQ/L (ref 135–145)
URATE SERPL-MCNC: 7.2 MG/DL (ref 3.7–7)
WBC # BLD AUTO: 11.9 THOU/MM3 (ref 4.8–10.8)

## 2023-09-02 PROCEDURE — 99284 EMERGENCY DEPT VISIT MOD MDM: CPT

## 2023-09-02 PROCEDURE — 80048 BASIC METABOLIC PNL TOTAL CA: CPT

## 2023-09-02 PROCEDURE — 6370000000 HC RX 637 (ALT 250 FOR IP): Performed by: EMERGENCY MEDICINE

## 2023-09-02 PROCEDURE — 36415 COLL VENOUS BLD VENIPUNCTURE: CPT

## 2023-09-02 PROCEDURE — 84550 ASSAY OF BLOOD/URIC ACID: CPT

## 2023-09-02 PROCEDURE — 85025 COMPLETE CBC W/AUTO DIFF WBC: CPT

## 2023-09-02 PROCEDURE — 73140 X-RAY EXAM OF FINGER(S): CPT

## 2023-09-02 RX ORDER — CEPHALEXIN 250 MG/1
500 CAPSULE ORAL ONCE
Status: COMPLETED | OUTPATIENT
Start: 2023-09-02 | End: 2023-09-02

## 2023-09-02 RX ORDER — INDOMETHACIN 50 MG/1
50 CAPSULE ORAL 3 TIMES DAILY PRN
Qty: 20 CAPSULE | Refills: 0 | Status: SHIPPED | OUTPATIENT
Start: 2023-09-02

## 2023-09-02 RX ORDER — INDOMETHACIN 50 MG/1
50 CAPSULE ORAL ONCE
Status: COMPLETED | OUTPATIENT
Start: 2023-09-02 | End: 2023-09-02

## 2023-09-02 RX ORDER — CEPHALEXIN 500 MG/1
500 CAPSULE ORAL 3 TIMES DAILY
Qty: 30 CAPSULE | Refills: 0 | Status: SHIPPED | OUTPATIENT
Start: 2023-09-02

## 2023-09-02 RX ADMIN — CEPHALEXIN 500 MG: 250 CAPSULE ORAL at 19:04

## 2023-09-02 RX ADMIN — INDOMETHACIN 50 MG: 50 CAPSULE ORAL at 19:04

## 2023-09-02 NOTE — ED NOTES
Pt arrives to ED from home with c/o finger pain and soreness on right hand  Pt states he noticed it about 3 weeks ago, when he woke up this morning it was cracked and bleeding      Ji Méndez RN  09/02/23 BEA Armenta  09/02/23 9996

## 2023-09-02 NOTE — ED PROVIDER NOTES
[facility] EMERGENCY DEPT      EMERGENCY MEDICINE     Room # D/D    Pt Name: Kyung Jensen  MRN: [id number]  [address] [facility] 1970  Date of evaluation: 9/2/2023  Provider: Fior Whitlock MD    CHIEF COMPLAINT       Chief Complaint   Patient presents with    Finger Pain     HISTORY OF PRESENT ILLNESS   Kyung Jensen is a pleasant 52 y.o. male who presents to the emergency department from from home, as a walk in to the ED lobby for evaluation of   Right middle finger swelling and pain been ongoing for the past 5 days. Denies any injury or trauma or fall, the swelling initially is very small however there is getting bigger. Patient this morning noted some blood on her bed his bed and swelling were noted more on the DIP joint of the third finger. Patient swore she did not have any injury or trauma however there is small abrasions on the DIP joint. Patient has AKA on the right leg and use a wheelchair which he propel it with his hands. .  Patient also has diabetes and his A1c lately were normal in the 6.8    PASTMEDICAL HISTORY     Past Medical History:   Diagnosis Date    Atherosclerotic heart disease of native coronary artery with unspecified angina pectoris 1/18/2022    Bipolar 2 disorder (HCC)     previously followed with Dr. Darío Clark and Marycruz Andrade in [place]    BPH (benign prostatic hyperplasia)     Chronic diastolic congestive heart failure ([address]) 3/1/2023    COPD (chronic obstructive pulmonary disease) ([address])     Diabetes mellitus type 2, uncontrolled     HgbA1c on 4/2/2019 was 9.1.     Diabetic peripheral neuropathy (HCC)     Diabetic polyneuropathy ([address])     Diabetic ulcer of right foot associated with type 2 diabetes mellitus ([address]) 12/10/2015    Essential hypertension     \"never been on b/p medication that I know of\"    GERD (gastroesophageal reflux disease)     Hammer toe of left foot     Heart murmur     denies any chest pain or palpitations    History of tobacco abuse     Hx of AKA

## 2023-09-05 ENCOUNTER — TELEPHONE (OUTPATIENT)
Dept: INTERNAL MEDICINE CLINIC | Age: 53
End: 2023-09-05

## 2023-09-14 NOTE — PROGRESS NOTES
[facility] Occupational Therapy Triage Note    Occupational therapy order received and discharged following established triage process with Physical therapy. PT screened patient for cognitive, ADL, mobility, and safety deficits (see results and discharge recommendations in PT progress note). No OT needs identified by PT. Results indicate that no acute care level Occupational therapy services are needed at this time.     OT Treatment Plan: Discharge acute care OT.    Post acute recommendations:  OT:  Discharge home.   Orthopaedic Progress Note      SUBJECTIVE   Mr. Domitila Ledbetter is post op day # 1     Patient notes no new concerns, awake and alert  S/p left shoulder Arthrotomy, cultures      OBJECTIVE      Physical    VITALS:  /81  Pulse 98  Temp 97.9 °F (36.6 °C) (Oral)   Resp 16  Ht 5' 6\" (1.676 m)  Wt 211 lb (95.7 kg)  SpO2 98%  BMI 34.06 kg/m2  DRAIN/TUBE OUTPUT:     I/O last 3 completed shifts:   In: 3116.4 [P.O.:680; I.V.:2436.4]  Out: 1065 [Urine:1050; Drains:15]      Left shoulder with a dry dressing in place, HVAC with  Minimal out      Data  CBC:   Lab Results   Component Value Date    WBC 9.0 08/19/2017    HGB 12.3 08/19/2017     08/19/2017     BMP:    Lab Results   Component Value Date     08/19/2017    K 4.5 08/19/2017     08/19/2017    CO2 23 08/19/2017    BUN 16 08/19/2017    CREATININE 0.6 08/19/2017    CALCIUM 9.0 08/19/2017    GLUCOSE 187 08/19/2017     Uric Acid:  No components found for: URIC  PT/INR:    Lab Results   Component Value Date    PROTIME 12.2 07/19/2016    INR 1.07 07/19/2016     Troponin:  No results found for: TROPONINI  Urine Culture:  No components found for: CURINE      Current Inpatient Medications    Current Facility-Administered Medications: glucose (GLUTOSE) 40 % oral gel 15 g, 15 g, Oral, PRN  dextrose 50 % solution 12.5 g, 12.5 g, Intravenous, PRN  glucagon (rDNA) injection 1 mg, 1 mg, Intramuscular, PRN  dextrose 5 % solution, 100 mL/hr, Intravenous, PRN  diphenhydrAMINE (BENADRYL) injection 25 mg, 25 mg, Intravenous, Q6H PRN  ARIPiprazole (ABILIFY) tablet 15 mg, 15 mg, Oral, Daily  doxycycline hyclate (VIBRA-TABS) tablet 100 mg, 100 mg, Oral, 2 times per day  sodium chloride flush 0.9 % injection 10 mL, 10 mL, Intravenous, 2 times per day  sodium chloride flush 0.9 % injection 10 mL, 10 mL, Intravenous, PRN  acetaminophen (TYLENOL) tablet 650 mg, 650 mg, Oral, Q4H PRN  magnesium hydroxide (MILK OF MAGNESIA) 400 MG/5ML suspension 30 mL, 30 mL, Oral, Daily PRN  ondansetron (ZOFRAN) injection 4 mg, 4 mg, Intravenous, Q6H PRN  0.9 % sodium chloride infusion, , Intravenous, Continuous  morphine injection 2 mg, 2 mg, Intravenous, Q2H PRN **OR** morphine (PF) injection 4 mg, 4 mg, Intravenous, Q2H PRN  insulin glargine (LANTUS) injection vial 15 Units, 15 Units, Subcutaneous, Nightly  insulin lispro (HUMALOG) injection vial 15 Units, 15 Units, Subcutaneous, TID AC  mirtazapine (REMERON) tablet 15 mg, 15 mg, Oral, Nightly  sertraline (ZOLOFT) tablet 200 mg, 200 mg, Oral, Daily  oxyCODONE-acetaminophen (PERCOCET) 5-325 MG per tablet 1 tablet, 1 tablet, Oral, Q4H PRN  oxyCODONE-acetaminophen (PERCOCET) 5-325 MG per tablet 2 tablet, 2 tablet, Oral, Q4H PRN        PLAN    Awaiting cultures, OK to D/C when arrangements have been made, pull drain before D/C

## 2023-09-25 SDOH — HEALTH STABILITY: PHYSICAL HEALTH: ON AVERAGE, HOW MANY DAYS PER WEEK DO YOU ENGAGE IN MODERATE TO STRENUOUS EXERCISE (LIKE A BRISK WALK)?: 0 DAYS

## 2023-09-25 ASSESSMENT — SOCIAL DETERMINANTS OF HEALTH (SDOH)
WITHIN THE LAST YEAR, HAVE YOU BEEN KICKED, HIT, SLAPPED, OR OTHERWISE PHYSICALLY HURT BY YOUR PARTNER OR EX-PARTNER?: NO
WITHIN THE LAST YEAR, HAVE YOU BEEN HUMILIATED OR EMOTIONALLY ABUSED IN OTHER WAYS BY YOUR PARTNER OR EX-PARTNER?: NO
WITHIN THE LAST YEAR, HAVE TO BEEN RAPED OR FORCED TO HAVE ANY KIND OF SEXUAL ACTIVITY BY YOUR PARTNER OR EX-PARTNER?: NO
WITHIN THE LAST YEAR, HAVE YOU BEEN AFRAID OF YOUR PARTNER OR EX-PARTNER?: NO

## 2023-09-26 ENCOUNTER — OFFICE VISIT (OUTPATIENT)
Dept: FAMILY MEDICINE CLINIC | Age: 53
End: 2023-09-26
Payer: MEDICARE

## 2023-09-26 VITALS
SYSTOLIC BLOOD PRESSURE: 138 MMHG | TEMPERATURE: 98.1 F | OXYGEN SATURATION: 94 % | DIASTOLIC BLOOD PRESSURE: 76 MMHG | HEART RATE: 98 BPM | RESPIRATION RATE: 18 BRPM

## 2023-09-26 DIAGNOSIS — K21.9 GASTROESOPHAGEAL REFLUX DISEASE WITHOUT ESOPHAGITIS: ICD-10-CM

## 2023-09-26 DIAGNOSIS — I83.029 VENOUS STASIS ULCER OF LEFT LOWER LEG WITH EDEMA OF LEFT LOWER LEG (HCC): ICD-10-CM

## 2023-09-26 DIAGNOSIS — S78.111A ABOVE KNEE AMPUTATION OF RIGHT LOWER EXTREMITY (HCC): ICD-10-CM

## 2023-09-26 DIAGNOSIS — I10 ESSENTIAL HYPERTENSION: ICD-10-CM

## 2023-09-26 DIAGNOSIS — L97.929 VENOUS STASIS ULCER OF LEFT LOWER LEG WITH EDEMA OF LEFT LOWER LEG (HCC): ICD-10-CM

## 2023-09-26 DIAGNOSIS — I25.119 ATHEROSCLEROSIS OF NATIVE CORONARY ARTERY OF NATIVE HEART WITH ANGINA PECTORIS (HCC): ICD-10-CM

## 2023-09-26 DIAGNOSIS — R60.0 VENOUS STASIS ULCER OF LEFT LOWER LEG WITH EDEMA OF LEFT LOWER LEG (HCC): ICD-10-CM

## 2023-09-26 DIAGNOSIS — Z95.820 S/P ANGIOPLASTY WITH STENT: ICD-10-CM

## 2023-09-26 DIAGNOSIS — Z79.4 TYPE 2 DIABETES MELLITUS WITH OTHER CIRCULATORY COMPLICATION, WITH LONG-TERM CURRENT USE OF INSULIN (HCC): ICD-10-CM

## 2023-09-26 DIAGNOSIS — G62.9 NEUROPATHY: ICD-10-CM

## 2023-09-26 DIAGNOSIS — Z00.00 ENCOUNTER FOR MEDICAL EXAMINATION TO ESTABLISH CARE: Primary | ICD-10-CM

## 2023-09-26 DIAGNOSIS — E11.59 TYPE 2 DIABETES MELLITUS WITH OTHER CIRCULATORY COMPLICATION, WITH LONG-TERM CURRENT USE OF INSULIN (HCC): ICD-10-CM

## 2023-09-26 DIAGNOSIS — I83.892 VENOUS STASIS ULCER OF LEFT LOWER LEG WITH EDEMA OF LEFT LOWER LEG (HCC): ICD-10-CM

## 2023-09-26 DIAGNOSIS — E03.9 HYPOTHYROIDISM, UNSPECIFIED TYPE: ICD-10-CM

## 2023-09-26 DIAGNOSIS — R39.12 WEAK URINE STREAM: ICD-10-CM

## 2023-09-26 PROBLEM — I25.10 CORONARY ARTERY DISEASE INVOLVING NATIVE CORONARY ARTERY OF NATIVE HEART: Status: RESOLVED | Noted: 2023-07-18 | Resolved: 2023-09-26

## 2023-09-26 PROBLEM — K92.2 GI BLEED: Status: RESOLVED | Noted: 2020-03-02 | Resolved: 2023-09-26

## 2023-09-26 PROBLEM — D64.9 ANEMIA: Status: RESOLVED | Noted: 2019-02-15 | Resolved: 2023-09-26

## 2023-09-26 PROBLEM — T87.43 INFECTION OF ABOVE KNEE AMPUTATION STUMP OF RIGHT LEG (HCC): Status: RESOLVED | Noted: 2019-04-18 | Resolved: 2023-09-26

## 2023-09-26 PROBLEM — S78.112A ABOVE KNEE AMPUTATION OF LEFT LOWER EXTREMITY (HCC): Status: RESOLVED | Noted: 2022-11-23 | Resolved: 2023-09-26

## 2023-09-26 PROBLEM — R10.30 LOWER ABDOMINAL PAIN: Status: RESOLVED | Noted: 2022-10-30 | Resolved: 2023-09-26

## 2023-09-26 PROBLEM — R74.8 ELEVATED LIPASE: Status: RESOLVED | Noted: 2020-03-12 | Resolved: 2023-09-26

## 2023-09-26 PROBLEM — R42 DIZZINESS, NONSPECIFIC: Status: RESOLVED | Noted: 2020-12-04 | Resolved: 2023-09-26

## 2023-09-26 PROBLEM — R06.00 DYSPNEA: Status: RESOLVED | Noted: 2020-06-19 | Resolved: 2023-09-26

## 2023-09-26 PROBLEM — E11.65 UNCONTROLLED TYPE 2 DIABETES MELLITUS WITH HYPERGLYCEMIA, WITH LONG-TERM CURRENT USE OF INSULIN (HCC): Chronic | Status: RESOLVED | Noted: 2017-07-26 | Resolved: 2023-09-26

## 2023-09-26 PROBLEM — J10.1 INFLUENZA B: Status: RESOLVED | Noted: 2019-09-03 | Resolved: 2023-09-26

## 2023-09-26 PROBLEM — R07.9 CHEST PAIN IN ADULT: Status: RESOLVED | Noted: 2023-07-18 | Resolved: 2023-09-26

## 2023-09-26 PROBLEM — J44.1 COPD EXACERBATION (HCC): Status: RESOLVED | Noted: 2019-09-03 | Resolved: 2023-09-26

## 2023-09-26 PROBLEM — Z87.898 HISTORY OF CHEST PAIN: Status: RESOLVED | Noted: 2020-09-25 | Resolved: 2023-09-26

## 2023-09-26 PROBLEM — I25.10 CORONARY ARTERY DISEASE INVOLVING NATIVE CORONARY ARTERY OF NATIVE HEART WITHOUT ANGINA PECTORIS: Status: RESOLVED | Noted: 2023-03-01 | Resolved: 2023-09-26

## 2023-09-26 PROBLEM — R06.02 SHORTNESS OF BREATH: Status: RESOLVED | Noted: 2020-06-18 | Resolved: 2023-09-26

## 2023-09-26 PROBLEM — E87.8 ELECTROLYTE IMBALANCE: Status: RESOLVED | Noted: 2019-02-15 | Resolved: 2023-09-26

## 2023-09-26 LAB — HBA1C MFR BLD: 5.6 % (ref 4.3–5.7)

## 2023-09-26 PROCEDURE — G8417 CALC BMI ABV UP PARAM F/U: HCPCS | Performed by: NURSE PRACTITIONER

## 2023-09-26 PROCEDURE — 83036 HEMOGLOBIN GLYCOSYLATED A1C: CPT | Performed by: NURSE PRACTITIONER

## 2023-09-26 PROCEDURE — 3078F DIAST BP <80 MM HG: CPT | Performed by: NURSE PRACTITIONER

## 2023-09-26 PROCEDURE — 1036F TOBACCO NON-USER: CPT | Performed by: NURSE PRACTITIONER

## 2023-09-26 PROCEDURE — 3075F SYST BP GE 130 - 139MM HG: CPT | Performed by: NURSE PRACTITIONER

## 2023-09-26 PROCEDURE — G8427 DOCREV CUR MEDS BY ELIG CLIN: HCPCS | Performed by: NURSE PRACTITIONER

## 2023-09-26 PROCEDURE — 99215 OFFICE O/P EST HI 40 MIN: CPT | Performed by: NURSE PRACTITIONER

## 2023-09-26 PROCEDURE — 2022F DILAT RTA XM EVC RTNOPTHY: CPT | Performed by: NURSE PRACTITIONER

## 2023-09-26 PROCEDURE — 3017F COLORECTAL CA SCREEN DOC REV: CPT | Performed by: NURSE PRACTITIONER

## 2023-09-26 PROCEDURE — 3044F HG A1C LEVEL LT 7.0%: CPT | Performed by: NURSE PRACTITIONER

## 2023-09-26 RX ORDER — OMEPRAZOLE 40 MG/1
40 CAPSULE, DELAYED RELEASE ORAL
Qty: 30 CAPSULE | Refills: 5 | Status: SHIPPED | OUTPATIENT
Start: 2023-09-26

## 2023-09-26 RX ORDER — ATORVASTATIN CALCIUM 80 MG/1
80 TABLET, FILM COATED ORAL NIGHTLY
Qty: 30 TABLET | Refills: 5 | Status: SHIPPED | OUTPATIENT
Start: 2023-09-26

## 2023-09-26 RX ORDER — INSULIN GLARGINE 300 U/ML
INJECTION, SOLUTION SUBCUTANEOUS
Qty: 42 ML | Refills: 2 | Status: SHIPPED
Start: 2023-09-26

## 2023-09-26 NOTE — PROGRESS NOTES
50 minutes spent on visit time today reviewing past records, developing a plan of care, examining pt and documenting visit discussing T2DM and other medications to control sugar, discussing diabetic diet and over 50% of time spent face to face. Return in about 3 months (around 12/26/2023) for f/u Diabetes .

## 2023-09-28 ENCOUNTER — HOSPITAL ENCOUNTER (EMERGENCY)
Age: 53
Discharge: HOME | End: 2023-09-28
Payer: MEDICARE

## 2023-09-28 ENCOUNTER — OFFICE VISIT (OUTPATIENT)
Dept: INTERNAL MEDICINE CLINIC | Age: 53
End: 2023-09-28
Payer: MEDICARE

## 2023-09-28 VITALS — TEMPERATURE: 101.8 F | HEART RATE: 95 BPM | SYSTOLIC BLOOD PRESSURE: 132 MMHG | DIASTOLIC BLOOD PRESSURE: 64 MMHG

## 2023-09-28 VITALS
OXYGEN SATURATION: 98 % | DIASTOLIC BLOOD PRESSURE: 77 MMHG | RESPIRATION RATE: 17 BRPM | BODY MASS INDEX: 36.16 KG/M2 | HEART RATE: 95 BPM | SYSTOLIC BLOOD PRESSURE: 157 MMHG | TEMPERATURE: 99 F | WEIGHT: 225 LBS | HEIGHT: 66 IN

## 2023-09-28 DIAGNOSIS — Z79.4 TYPE 2 DIABETES MELLITUS WITH HYPERGLYCEMIA, WITH LONG-TERM CURRENT USE OF INSULIN (HCC): Primary | ICD-10-CM

## 2023-09-28 DIAGNOSIS — M67.449 GANGLION CYST OF FINGER: Primary | ICD-10-CM

## 2023-09-28 DIAGNOSIS — E11.65 TYPE 2 DIABETES MELLITUS WITH HYPERGLYCEMIA, WITH LONG-TERM CURRENT USE OF INSULIN (HCC): Primary | ICD-10-CM

## 2023-09-28 PROCEDURE — 99999 PR OFFICE/OUTPT VISIT,PROCEDURE ONLY: CPT | Performed by: INTERNAL MEDICINE

## 2023-09-28 PROCEDURE — G0108 DIAB MANAGE TRN  PER INDIV: HCPCS | Performed by: INTERNAL MEDICINE

## 2023-09-28 PROCEDURE — 99283 EMERGENCY DEPT VISIT LOW MDM: CPT

## 2023-09-28 RX ORDER — CEPHALEXIN 500 MG/1
500 CAPSULE ORAL 2 TIMES DAILY
Qty: 10 CAPSULE | Refills: 0 | Status: SHIPPED | OUTPATIENT
Start: 2023-09-28 | End: 2023-10-03

## 2023-09-28 NOTE — ED TRIAGE NOTES
Pt presents to the ER with c/o a growth on his right middle finger. Pt states he was seen here a few weeks ago and given abx which did not help. Pt denies injury or drainage.  Pt is alert, vss

## 2023-09-28 NOTE — PATIENT INSTRUCTIONS
Time to get scheduled for your diabetes eye exam    2. Treatment of hypoglycemia (low blood sugars):   -If sugar is below 80 mg/dL, treat with 15 grams of simple sugar/rapid acting carb (3-4 glucose tablets, 4 oz of regular juice, 1/2 can of pop, 5 sugar-containing candies, 1 tablespoon of honey, 13-15 sweet tarts). -Recheck in 15 minutes. If above 80 mg/dL, have small meal or snack. If not above 80 mg/dL, repeat the 15 grams of simple carbs until above 80 mg/dL. 3. When you have high blood sugars and are not eating, give your correction scale instead of Humalog 35 units.      GROUP 2    Blood Sugar Dose fast acting insulin    None   140-199 2 unit   200-249 4 units   250-299 6 units   300-349 8 units   350-399 10 units   400 and above 12 units

## 2023-10-05 DIAGNOSIS — G62.9 NEUROPATHY: ICD-10-CM

## 2023-10-05 RX ORDER — DULAGLUTIDE 4.5 MG/.5ML
INJECTION, SOLUTION SUBCUTANEOUS
Qty: 2 ML | Refills: 5 | OUTPATIENT
Start: 2023-10-05

## 2023-10-05 RX ORDER — GABAPENTIN 800 MG/1
TABLET ORAL
Qty: 270 TABLET | Refills: 0 | OUTPATIENT
Start: 2023-10-05

## 2023-10-12 ENCOUNTER — HOSPITAL ENCOUNTER (EMERGENCY)
Age: 53
Discharge: HOME | End: 2023-10-12
Attending: EMERGENCY MEDICINE
Payer: MEDICARE

## 2023-10-12 ENCOUNTER — APPOINTMENT (OUTPATIENT)
Dept: GENERAL RADIOLOGY | Age: 53
End: 2023-10-12
Payer: MEDICARE

## 2023-10-12 VITALS
RESPIRATION RATE: 23 BRPM | HEART RATE: 95 BPM | OXYGEN SATURATION: 94 % | SYSTOLIC BLOOD PRESSURE: 138 MMHG | TEMPERATURE: 98.4 F | DIASTOLIC BLOOD PRESSURE: 67 MMHG

## 2023-10-12 DIAGNOSIS — R10.13 ABDOMINAL PAIN, EPIGASTRIC: Primary | ICD-10-CM

## 2023-10-12 DIAGNOSIS — R07.89 ATYPICAL CHEST PAIN: ICD-10-CM

## 2023-10-12 DIAGNOSIS — R74.8 ELEVATED LIPASE: ICD-10-CM

## 2023-10-12 LAB
ALBUMIN SERPL BCG-MCNC: 3.9 G/DL (ref 3.5–5.1)
ALP SERPL-CCNC: 67 U/L (ref 38–126)
ALT SERPL W/O P-5'-P-CCNC: 26 U/L (ref 11–66)
ANION GAP SERPL CALC-SCNC: 12 MEQ/L (ref 8–16)
AST SERPL-CCNC: 33 U/L (ref 5–40)
BASOPHILS ABSOLUTE: 0 THOU/MM3 (ref 0–0.1)
BASOPHILS NFR BLD AUTO: 0.5 %
BILIRUB CONJ SERPL-MCNC: < 0.2 MG/DL (ref 0–0.3)
BILIRUB SERPL-MCNC: 0.5 MG/DL (ref 0.3–1.2)
BUN SERPL-MCNC: 12 MG/DL (ref 7–22)
CALCIUM SERPL-MCNC: 9.4 MG/DL (ref 8.5–10.5)
CHLORIDE SERPL-SCNC: 102 MEQ/L (ref 98–111)
CO2 SERPL-SCNC: 24 MEQ/L (ref 23–33)
CREAT SERPL-MCNC: 0.8 MG/DL (ref 0.4–1.2)
D DIMER PPP IA.FEU-MCNC: 427 NG/ML FEU (ref 0–500)
DEPRECATED RDW RBC AUTO: 48.7 FL (ref 35–45)
EOSINOPHIL NFR BLD AUTO: 3.9 %
EOSINOPHILS ABSOLUTE: 0.4 THOU/MM3 (ref 0–0.4)
ERYTHROCYTE [DISTWIDTH] IN BLOOD BY AUTOMATED COUNT: 13.9 % (ref 11.5–14.5)
GFR SERPL CREATININE-BSD FRML MDRD: > 60 ML/MIN/1.73M2
GLUCOSE SERPL-MCNC: 170 MG/DL (ref 70–108)
HCT VFR BLD AUTO: 50.3 % (ref 42–52)
HGB BLD-MCNC: 16.8 GM/DL (ref 14–18)
IMM GRANULOCYTES # BLD AUTO: 0.06 THOU/MM3 (ref 0–0.07)
IMM GRANULOCYTES NFR BLD AUTO: 0.6 %
LIPASE SERPL-CCNC: 149.3 U/L (ref 5.6–51.3)
LYMPHOCYTES ABSOLUTE: 2.5 THOU/MM3 (ref 1–4.8)
LYMPHOCYTES NFR BLD AUTO: 26.1 %
MCH RBC QN AUTO: 31.8 PG (ref 26–33)
MCHC RBC AUTO-ENTMCNC: 33.4 GM/DL (ref 32.2–35.5)
MCV RBC AUTO: 95.3 FL (ref 80–94)
MONOCYTES ABSOLUTE: 0.8 THOU/MM3 (ref 0.4–1.3)
MONOCYTES NFR BLD AUTO: 8.7 %
NEUTROPHILS NFR BLD AUTO: 60.2 %
NRBC BLD AUTO-RTO: 0 /100 WBC
NT-PROBNP SERPL IA-MCNC: < 36 PG/ML (ref 0–124)
OSMOLALITY SERPL CALC.SUM OF ELEC: 279.4 MOSMOL/KG (ref 275–300)
PLATELET # BLD AUTO: 210 THOU/MM3 (ref 130–400)
PMV BLD AUTO: 9.5 FL (ref 9.4–12.4)
POTASSIUM SERPL-SCNC: 4.9 MEQ/L (ref 3.5–5.2)
PROT SERPL-MCNC: 7.6 G/DL (ref 6.1–8)
RBC # BLD AUTO: 5.28 MILL/MM3 (ref 4.7–6.1)
SEGMENTED NEUTROPHILS ABSOLUTE COUNT: 5.7 THOU/MM3 (ref 1.8–7.7)
SODIUM SERPL-SCNC: 138 MEQ/L (ref 135–145)
TROPONIN, HIGH SENSITIVITY: 29 NG/L (ref 0–12)
TROPONIN, HIGH SENSITIVITY: 36 NG/L (ref 0–12)
WBC # BLD AUTO: 9.5 THOU/MM3 (ref 4.8–10.8)

## 2023-10-12 PROCEDURE — 83690 ASSAY OF LIPASE: CPT

## 2023-10-12 PROCEDURE — 85379 FIBRIN DEGRADATION QUANT: CPT

## 2023-10-12 PROCEDURE — 36415 COLL VENOUS BLD VENIPUNCTURE: CPT

## 2023-10-12 PROCEDURE — 71046 X-RAY EXAM CHEST 2 VIEWS: CPT

## 2023-10-12 PROCEDURE — 83880 ASSAY OF NATRIURETIC PEPTIDE: CPT

## 2023-10-12 PROCEDURE — 80053 COMPREHEN METABOLIC PANEL: CPT

## 2023-10-12 PROCEDURE — 84484 ASSAY OF TROPONIN QUANT: CPT

## 2023-10-12 PROCEDURE — 85025 COMPLETE CBC W/AUTO DIFF WBC: CPT

## 2023-10-12 PROCEDURE — 96374 THER/PROPH/DIAG INJ IV PUSH: CPT

## 2023-10-12 PROCEDURE — 96375 TX/PRO/DX INJ NEW DRUG ADDON: CPT

## 2023-10-12 PROCEDURE — 82248 BILIRUBIN DIRECT: CPT

## 2023-10-12 PROCEDURE — 2580000003 HC RX 258

## 2023-10-12 PROCEDURE — 6360000002 HC RX W HCPCS

## 2023-10-12 PROCEDURE — 99284 EMERGENCY DEPT VISIT MOD MDM: CPT

## 2023-10-12 RX ORDER — 0.9 % SODIUM CHLORIDE 0.9 %
500 INTRAVENOUS SOLUTION INTRAVENOUS ONCE
Status: COMPLETED | OUTPATIENT
Start: 2023-10-12 | End: 2023-10-12

## 2023-10-12 RX ORDER — ONDANSETRON 2 MG/ML
4 INJECTION INTRAMUSCULAR; INTRAVENOUS ONCE
Status: COMPLETED | OUTPATIENT
Start: 2023-10-12 | End: 2023-10-12

## 2023-10-12 RX ORDER — MORPHINE SULFATE 4 MG/ML
4 INJECTION, SOLUTION INTRAMUSCULAR; INTRAVENOUS ONCE
Status: COMPLETED | OUTPATIENT
Start: 2023-10-12 | End: 2023-10-12

## 2023-10-12 RX ORDER — HYDROCODONE BITARTRATE AND ACETAMINOPHEN 5; 325 MG/1; MG/1
1 TABLET ORAL EVERY 6 HOURS PRN
Qty: 10 TABLET | Refills: 0 | Status: SHIPPED | OUTPATIENT
Start: 2023-10-12 | End: 2023-10-15

## 2023-10-12 RX ADMIN — ONDANSETRON 4 MG: 2 INJECTION INTRAMUSCULAR; INTRAVENOUS at 10:59

## 2023-10-12 RX ADMIN — SODIUM CHLORIDE 500 ML: 9 INJECTION, SOLUTION INTRAVENOUS at 11:02

## 2023-10-12 RX ADMIN — MORPHINE SULFATE 4 MG: 4 INJECTION, SOLUTION INTRAMUSCULAR; INTRAVENOUS at 10:59

## 2023-10-12 NOTE — ED PROVIDER NOTES
[facility] DEPT  EMERGENCY DEPARTMENT ENCOUNTER      Pt Name: Nona Ingram  MRN: [id number]  [address] [facility] 1970  Date of evaluation: 10/12/2023  Resident Physician: Drake Melton DO  Supervising Physician: Dr. Tory Watson MD    [address]       Chief Complaint   Patient presents with    Chest Pain       HISTORY OF PRESENT ILLNESS    Nona Ingram is a 52 y.o. male who presents to the emergency department for evaluation of Right abdominal pain  Past medical history of HTN, HLD, Smoker, thyroidism, COPD, HLD, CHF. Patient's had his gallbladder removed at this only abdominal surgery. No history of EtOH use, no liver disease, no hepatitis. Reports of some right upper quadrant pain started couple days ago he feels feverish but has been afebrile, nauseated with no vomiting, reproducible pain when he moves or takes a deep breath. Unable to lay on his right side. Still has his appendix. Of diabetes, had right leg amputated, recent A1c 5.6. Patient's orts no chest pain, chronic shortness of breath due to COPD, no urinary symptoms, chronic diarrhea scheduled for colonoscopy tomorrow with . Recently had endoscopy was normal.    Patient history of LBBB follows cardiology with Dr. Amanda Patino latest cath 1/31 showed no signifcant CAD, RCA stent patent, preserved EF of 55%. The patient has no other acute complaints at this time. PAST MEDICAL AND SURGICAL HISTORY     Past Medical History:   Diagnosis Date    Atherosclerotic heart disease of native coronary artery with unspecified angina pectoris 1/18/2022    Bipolar 2 disorder (HCC)     previously followed with Dr. Linda Kaiser and Arti Bailey in [place]    BPH (benign prostatic hyperplasia)     Chronic diastolic congestive heart failure ([address]) 3/1/2023    COPD (chronic obstructive pulmonary disease) ([address])     Diabetes mellitus type 2, uncontrolled     HgbA1c on 4/2/2019 was 9.1.     Diabetic peripheral neuropathy

## 2023-10-12 NOTE — ED NOTES
Pt resting quietly on cot in stable condition. Denies any needs at this time. Call light in reach.       Clive Vasques RN  10/12/23 1124

## 2023-10-12 NOTE — ED NOTES
Pt has removed monitoring devices and is declining assessment of vital signs. Pt is in stable condition sitting on side of cot. Pts IV was accidentally removed when he was adjusting his position. Bleeding controlled. Updated pt on current POC.       Naty Dumont RN  10/12/23 8195

## 2023-10-12 NOTE — ED TRIAGE NOTES
Pt to the ED via personal  transport. Pt presents with complaints of chest pain. Patient states that symptoms began a couple weeks ago. Patient is alert and oriented x 4. Respirations are regular and unlabored. Pt refusing EKG, telemetry,  and declines  to change into gown at this time.

## 2023-10-13 ENCOUNTER — ANESTHESIA EVENT (OUTPATIENT)
Dept: ENDOSCOPY | Age: 53
End: 2023-10-13
Payer: MEDICARE

## 2023-10-13 ENCOUNTER — ANESTHESIA (OUTPATIENT)
Dept: ENDOSCOPY | Age: 53
End: 2023-10-13
Payer: MEDICARE

## 2023-10-13 ENCOUNTER — HOSPITAL ENCOUNTER (OUTPATIENT)
Age: 53
Setting detail: OUTPATIENT SURGERY
Discharge: HOME | End: 2023-10-13
Attending: INTERNAL MEDICINE | Admitting: INTERNAL MEDICINE
Payer: MEDICARE

## 2023-10-13 VITALS
OXYGEN SATURATION: 96 % | BODY MASS INDEX: 36.16 KG/M2 | HEIGHT: 66 IN | HEART RATE: 89 BPM | RESPIRATION RATE: 18 BRPM | TEMPERATURE: 97.4 F | DIASTOLIC BLOOD PRESSURE: 65 MMHG | SYSTOLIC BLOOD PRESSURE: 132 MMHG | WEIGHT: 225 LBS

## 2023-10-13 PROCEDURE — 3609027000 HC COLONOSCOPY: Performed by: INTERNAL MEDICINE

## 2023-10-13 PROCEDURE — 7100000011 HC PHASE II RECOVERY - ADDTL 15 MIN: Performed by: INTERNAL MEDICINE

## 2023-10-13 PROCEDURE — 2580000003 HC RX 258: Performed by: INTERNAL MEDICINE

## 2023-10-13 PROCEDURE — 7100000010 HC PHASE II RECOVERY - FIRST 15 MIN: Performed by: INTERNAL MEDICINE

## 2023-10-13 PROCEDURE — 3700000000 HC ANESTHESIA ATTENDED CARE: Performed by: INTERNAL MEDICINE

## 2023-10-13 PROCEDURE — 2580000003 HC RX 258

## 2023-10-13 PROCEDURE — 6360000002 HC RX W HCPCS

## 2023-10-13 PROCEDURE — 2709999900 HC NON-CHARGEABLE SUPPLY: Performed by: INTERNAL MEDICINE

## 2023-10-13 RX ORDER — SODIUM CHLORIDE 0.9 % (FLUSH) 0.9 %
5-40 SYRINGE (ML) INJECTION PRN
Status: DISCONTINUED | OUTPATIENT
Start: 2023-10-13 | End: 2023-10-13

## 2023-10-13 RX ORDER — SODIUM CHLORIDE 9 MG/ML
INJECTION, SOLUTION INTRAVENOUS ONCE
Status: DISCONTINUED | OUTPATIENT
Start: 2023-10-13 | End: 2023-10-13 | Stop reason: HOSPADM

## 2023-10-13 RX ORDER — SODIUM CHLORIDE 9 MG/ML
25 INJECTION, SOLUTION INTRAVENOUS PRN
Status: DISCONTINUED | OUTPATIENT
Start: 2023-10-13 | End: 2023-10-13

## 2023-10-13 RX ORDER — SODIUM CHLORIDE 9 MG/ML
INJECTION, SOLUTION INTRAVENOUS CONTINUOUS PRN
Status: DISCONTINUED | OUTPATIENT
Start: 2023-10-13 | End: 2023-10-13 | Stop reason: SDUPTHER

## 2023-10-13 RX ORDER — SODIUM CHLORIDE 0.9 % (FLUSH) 0.9 %
5-40 SYRINGE (ML) INJECTION EVERY 12 HOURS SCHEDULED
Status: DISCONTINUED | OUTPATIENT
Start: 2023-10-13 | End: 2023-10-13

## 2023-10-13 RX ORDER — GABAPENTIN 600 MG/1
TABLET ORAL
COMMUNITY
Start: 2022-09-27

## 2023-10-13 RX ORDER — SODIUM CHLORIDE 0.9 % (FLUSH) 0.9 %
10 SYRINGE (ML) INJECTION 2 TIMES DAILY
Status: DISCONTINUED | OUTPATIENT
Start: 2023-10-13 | End: 2023-10-13 | Stop reason: HOSPADM

## 2023-10-13 RX ADMIN — PROPOFOL 50 MG: 10 INJECTION, EMULSION INTRAVENOUS at 07:31

## 2023-10-13 RX ADMIN — SODIUM CHLORIDE 25 ML: 9 INJECTION, SOLUTION INTRAVENOUS at 06:36

## 2023-10-13 RX ADMIN — PROPOFOL 30 MG: 10 INJECTION, EMULSION INTRAVENOUS at 07:33

## 2023-10-13 RX ADMIN — SODIUM CHLORIDE: 9 INJECTION, SOLUTION INTRAVENOUS at 07:26

## 2023-10-13 ASSESSMENT — PAIN - FUNCTIONAL ASSESSMENT: PAIN_FUNCTIONAL_ASSESSMENT: NONE - DENIES PAIN

## 2023-10-13 ASSESSMENT — ENCOUNTER SYMPTOMS: SHORTNESS OF BREATH: 1

## 2023-10-13 NOTE — H&P
[place] [id number]                       PREOPERATIVE HISTORY AND PHYSICAL    PATIENT NAME: Ness Alejo                     :        1970  MED REC NO:   [id number]                           ROOM:  ACCOUNT NO:   [de-identified]                           ADMIT DATE: 10/13/2023  PROVIDER:     Jai Jordan M.D. PROCEDURE:  Colonoscopy. INDICATION:  The patient is a 57-year-old pleasant male, who complains  of diarrhea, loose watery bowel movements. Denied any blood in the  stool, lower abdominal pain, cramping-type of pain. Pain comes and  goes. He is undergoing further evaluation. PAST MEDICAL HISTORY:  Atherosclerotic heart disease, bipolar disorder,  benign prostatic hypertrophy, chronic diastolic congestive heart  failure, chronic obstructive pulmonary disease, diabetes mellitus type  2, peripheral neuropathy, polyneuropathy, diabetic ulcer of the right  foot, essential hypertension, reflux, macular degeneration,  hypothyroidism, and sleep apnea. PAST SURGICAL HISTORY:  Abdominal surgery, abscess drainage of the right  foot, amputation above knee of the right foot, cholecystectomy,  colonoscopy, cystoscopy, dilation of the esophagus, foot debridement,  gastrocnemius recession, incision and drainage of the abscess,  below-knee amputation, EGD and dilation, and wisdom tooth extraction. MEDICATIONS:  Reviewed. PHYSICAL EXAMINATION:  VITALS:  Temperature 97.4, pulse 97, respiratory rate 18, and blood  pressure 166/84. HEART:  Regular. Normal S1 and S2. No S3.  Early systolic murmur. No  gallops. LUNGS:  Equal to expansion on inspection. [place] air entry bilaterally. ABDOMEN:  Soft. Normoactive bowel sounds. IMPRESSION:  A 52year-old pleasant male who has diarrhea, lower  abdominal pain. He is undergoing further evaluation.     RECOMMENDATIONS:  Keep the patient nothing by mouth, proceed

## 2023-10-13 NOTE — DISCHARGE INSTRUCTIONS
High fiber diet  Fiber supplements  Reschedule colonoscopy with 2 day preparation  Please call our office if any gi problems .

## 2023-10-13 NOTE — ANESTHESIA POSTPROCEDURE EVALUATION
Department of Anesthesiology  Postprocedure Note    Patient: Cecilio Hensley  MRN: [id number]  YOB: 1970  Date of evaluation: 10/13/2023      Procedure Summary     Date: 10/13/23 Room / Location: [facility] / [address] [place] [address]    Anesthesia Start: 1664 Anesthesia Stop: 0740    Procedure: COLONOSCOPY Diagnosis:       Diarrhea, unspecified type      Lower abdominal pain      (Diarrhea, unspecified type [R19.7])      (Lower abdominal pain [R10.30])    Surgeons: Joshua Denise MD Responsible Provider: Kirsten Sanon DO    Anesthesia Type: MAC ASA Status: 3          Anesthesia Type: No value filed.     Everette Phase I: Everette Score: 10    Everette Phase II:        Anesthesia Post Evaluation    Patient location during evaluation: bedside  Patient participation: complete - patient participated  Level of consciousness: awake  Pain score: 0  Airway patency: patent  Nausea & Vomiting: no nausea and no vomiting  Complications: no  Cardiovascular status: hemodynamically stable and blood pressure returned to baseline  Respiratory status: room air, nonlabored ventilation and spontaneous ventilation  Hydration status: stable

## 2023-10-13 NOTE — PROGRESS NOTES
Pt in phase 2 of recovery. Denies pain, fully awake. Pt tolerating fluids and IV removed. Dr. Maynard Keto in to speak with patient at bedside. Discharge instructions given.

## 2023-10-13 NOTE — OP NOTE
[place] [address]                                OPERATIVE REPORT    PATIENT NAME: Mitra Martins                     :        1970  MED REC NO:   [id number]                           ROOM:  ACCOUNT NO:   [de-identified]                           ADMIT DATE: 10/13/2023  PROVIDER:     RICK Yanez OF PROCEDURE:  10/13/2023    PROCEDURE:  Colonoscopy. INDICATION:  A 52year-old pleasant male who has lower abdominal pain  and diarrhea. He is undergoing further evaluation. Please see my brief  history for details and for physical examination. ASA CLASSIFICATION:  As per Anesthesia. Please see Anesthesia note for  details. INSTRUMENT:  PCF-H190AL pediatric colonoscope. PHOTOGRAPHS:  Yes. BIOPSIES:  No.    ESTIMATED BLOOD LOSS:  None. BOWEL PREP SCORE:  1/9. CONSENT:  Procedure indications and complications including but not  limited to perforation, bleeding, infection, reaction to medicine, very  slight chance of missing significant lesions discussed with the patient  and the patient expressed his understanding and a written consent was  obtained. DESCRIPTION OF PROCEDURE:  The patient was placed in the left lateral  decubitus position in endo room #13. The patient was placed on  appropriate monitoring of vitals including blood pressure, heart rate,  and pulse ox. After the rectal examination, after the adequate total  intravenous anesthesia was given by Anesthesia, the PCF-H190AL pediatric  colonoscope was inserted into the rectum, the patient had hard brown  stool noted. Scope was advanced into the distal sigmoid colon. The  patient had thick hard stool that precluded complete evaluation. At  that time, I decided not to advance the scope to increase the risk of  complications, decided on withdrawal of the scope.   On withdrawal again,  thick hard stool noted in the distal sigmoid

## 2023-10-13 NOTE — PROGRESS NOTES
admitted to endoscopy for a colonoscopy. Procedure verified and consent signed. Nella Starkey at bedside.

## 2023-10-13 NOTE — ANESTHESIA PRE PROCEDURE
Department of Anesthesiology  Preprocedure Note       Name:  Ade Barton   Age:  52 y.o.  :  1970                                          MRN:  [id number]         Date:  10/13/2023      Surgeon: Stacy Nair):  Mia Villa MD    Procedure: Procedure(s):  COLONOSCOPY    Medications prior to admission:   Prior to Admission medications    Medication Sig Start Date End Date Taking? Authorizing Provider   gabapentin (NEURONTIN) 600 MG tablet 1 tab(s) orally 3 times a day for 90 days  Patient not taking: Reported on 10/13/2023 9/27/22   Provider, MD Minesh   HYDROcodone-acetaminophen (NORCO) 5-325 MG per tablet Take 1 tablet by mouth every 6 hours as needed for Pain for up to 3 days. Intended supply: 3 days. Take lowest dose possible to manage pain Max Daily Amount: 4 tablets 10/12/23 10/15/23  Narda Vera MD   Dulaglutide (TRULICITY) 4.5 DG/0.4QB SOPN INJECT 4.5 MG INTO THE SKIN ONCE A WEEK . 10/9/23   Merlin Mitchell MD   metoprolol tartrate (LOPRESSOR) 25 MG tablet Take 1 tablet by mouth 2 times daily Re start tomorrow evening /3 9/26/23   Juan Cortes APRN - CNP   omeprazole (PRILOSEC) 40 MG delayed release capsule Take 1 capsule by mouth every morning (before breakfast) 23   Juan Cortes APRN - CNP   atorvastatin (LIPITOR) 80 MG tablet Take 1 tablet by mouth at bedtime 23   Juan Cortes APRN - CNP   Insulin Glargine, 2 Unit Dial, (TOUJEO MAX SOLOSTAR) 300 UNIT/ML SOPN INJECT 35 UNITS IN THE MORNING, 35UNITS IN THE EVENING. EVERY 10 DAYS INCREASE BY 5 UNITS BOTH TIMES, TO GET MORNING GLUCOSE 150-200.   Patient taking differently: Inject 50 Units into the skin in the morning and at bedtime 23   MARILYN Ornelas CNP   isosorbide mononitrate (IMDUR) 30 MG extended release tablet TAKE ONE TABLET BY MOUTH ONCE A DAY 23   Ra Clark DO   gabapentin (NEURONTIN) 800 MG tablet TAKE ONE TABLET BY MOUTH THREE TIMES A DAY FOR 90 DAYS 8/2/23 10/31/23  Myla Mackey MD

## 2023-10-13 NOTE — BRIEF OP NOTE
Brief Postoperative Note      Patient: Kailee Castrejon  YOB: 1970  MRN: [id number]    Date of Procedure: 10/13/2023    Pre-Op Diagnosis Codes:     * Diarrhea, unspecified type [R19.7]     * Lower abdominal pain [R10.30]    Post-Op Diagnosis:  incomplete colonoscopy to poor preparation       Procedure(s):  COLONOSCOPY    Surgeon(s):  Wayne Crawford MD    Assistant:  * No surgical staff found *    Anesthesia: Monitor Anesthesia Care    Estimated Blood Loss (mL): none    Complications: None    Specimens:   * No specimens in log *    Implants:  * No implants in log *      Drains: * No LDAs found *    Findings: incomplete colonoscopy to poor preparation      Electronically signed by Wayne Crawford MD on 10/13/2023 at 7:42 AM

## 2023-10-23 ENCOUNTER — HOSPITAL ENCOUNTER (EMERGENCY)
Age: 53
Discharge: HOME | End: 2023-10-23
Payer: MEDICARE

## 2023-10-23 VITALS
DIASTOLIC BLOOD PRESSURE: 81 MMHG | HEART RATE: 98 BPM | RESPIRATION RATE: 16 BRPM | OXYGEN SATURATION: 99 % | SYSTOLIC BLOOD PRESSURE: 139 MMHG | TEMPERATURE: 98.2 F

## 2023-10-23 DIAGNOSIS — G62.9 NEUROPATHY: ICD-10-CM

## 2023-10-23 DIAGNOSIS — H61.21 IMPACTED CERUMEN OF RIGHT EAR: Primary | ICD-10-CM

## 2023-10-23 PROCEDURE — 69209 REMOVE IMPACTED EAR WAX UNI: CPT

## 2023-10-23 PROCEDURE — 99282 EMERGENCY DEPT VISIT SF MDM: CPT

## 2023-10-23 RX ORDER — GABAPENTIN 800 MG/1
800 TABLET ORAL 3 TIMES DAILY
Qty: 270 TABLET | Refills: 0 | Status: SHIPPED | OUTPATIENT
Start: 2023-10-23 | End: 2024-01-21

## 2023-10-23 RX ORDER — INSULIN GLARGINE 300 U/ML
INJECTION, SOLUTION SUBCUTANEOUS
Qty: 42 ML | Refills: 2 | Status: SHIPPED | OUTPATIENT
Start: 2023-10-23 | End: 2023-10-26 | Stop reason: SDUPTHER

## 2023-10-23 ASSESSMENT — PAIN DESCRIPTION - LOCATION: LOCATION: EAR

## 2023-10-23 ASSESSMENT — PAIN SCALES - GENERAL: PAINLEVEL_OUTOF10: 8

## 2023-10-23 ASSESSMENT — PAIN - FUNCTIONAL ASSESSMENT: PAIN_FUNCTIONAL_ASSESSMENT: 0-10

## 2023-10-23 ASSESSMENT — PAIN DESCRIPTION - ORIENTATION: ORIENTATION: RIGHT

## 2023-10-23 NOTE — TELEPHONE ENCOUNTER
Recent Visits  Date Type Provider Dept   09/26/23 Office Visit Leo Fair, APRN - CNP [facility]   04/20/23 Office Visit Rekha Marie MD [facility].   12/19/22 Office Visit Rekha Marie MD [facility].   08/29/22 Office Visit Rekha Marie MD [facility].   07/14/22 Office Visit Rekha Marie MD [facility] recent visits within past 540 days with a meds authorizing provider and meeting all other requirements  Future Appointments  Date Type Provider Dept   12/27/23 Appointment Juan Yarbrough, APRN - CNP [facility]   Showing future appointments within next 150 days with a meds authorizing provider and meeting all other requirements

## 2023-10-23 NOTE — TELEPHONE ENCOUNTER
----- Message from Alena Singh sent at 10/23/2023  9:01 AM EDT -----  Subject: Refill Request    QUESTIONS  Name of Medication? gabapentin (NEURONTIN) 800 MG tablet  Patient-reported dosage and instructions? 800mg 3 times a day   How many days do you have left? 3  Preferred Pharmacy? [facility] phone number (if available)? [phone]  ---------------------------------------------------------------------------  --------------,  Name of Medication? Insulin Glargine, 2 Unit Dial, (TOUJEO MAX SOLOSTAR)   300 UNIT/ML SOPN  Patient-reported dosage and instructions? once in morning and once at   night  How many days do you have left? 2  Preferred Pharmacy? [facility] phone number (if available)? [phone]  ---------------------------------------------------------------------------  --------------,  Name of Medication? insulin lispro (HUMALOG KWIKPEN U-200) 200 UNIT/ML   SOPN pen  Patient-reported dosage and instructions? 3 times a day   How many days do you have left? 2  Preferred Pharmacy? [facility] phone number (if available)? [phone]  ---------------------------------------------------------------------------  --------------  CALL BACK INFO  What is the best way for the office to contact you? OK to leave message on   voicemail  Preferred Call Back Phone Number? [phone]  ---------------------------------------------------------------------------  --------------  SCRIPT ANSWERS  Relationship to Patient?  Self

## 2023-10-27 RX ORDER — SODIUM CHLORIDE 9 MG/ML
INJECTION, SOLUTION INTRAVENOUS CONTINUOUS
Status: DISCONTINUED | OUTPATIENT
Start: 2023-10-27 | End: 2023-10-30 | Stop reason: HOSPADM

## 2023-10-30 ENCOUNTER — HOSPITAL ENCOUNTER (OUTPATIENT)
Age: 53
Setting detail: OUTPATIENT SURGERY
Discharge: HOME | End: 2023-10-30
Attending: INTERNAL MEDICINE | Admitting: INTERNAL MEDICINE
Payer: MEDICARE

## 2023-10-30 ENCOUNTER — ANESTHESIA (OUTPATIENT)
Dept: ENDOSCOPY | Age: 53
End: 2023-10-30
Payer: MEDICARE

## 2023-10-30 ENCOUNTER — ANESTHESIA EVENT (OUTPATIENT)
Dept: ENDOSCOPY | Age: 53
End: 2023-10-30
Payer: MEDICARE

## 2023-10-30 VITALS
RESPIRATION RATE: 16 BRPM | WEIGHT: 225 LBS | OXYGEN SATURATION: 98 % | HEIGHT: 66 IN | DIASTOLIC BLOOD PRESSURE: 61 MMHG | TEMPERATURE: 96.9 F | HEART RATE: 83 BPM | SYSTOLIC BLOOD PRESSURE: 120 MMHG | BODY MASS INDEX: 36.16 KG/M2

## 2023-10-30 DIAGNOSIS — E11.65 TYPE 2 DIABETES MELLITUS WITH HYPERGLYCEMIA, WITH LONG-TERM CURRENT USE OF INSULIN (HCC): Primary | ICD-10-CM

## 2023-10-30 DIAGNOSIS — Z79.4 TYPE 2 DIABETES MELLITUS WITH HYPERGLYCEMIA, WITH LONG-TERM CURRENT USE OF INSULIN (HCC): Primary | ICD-10-CM

## 2023-10-30 PROCEDURE — 2500000003 HC RX 250 WO HCPCS: Performed by: NURSE ANESTHETIST, CERTIFIED REGISTERED

## 2023-10-30 PROCEDURE — 3609010300 HC COLONOSCOPY W/BIOPSY SINGLE/MULTIPLE: Performed by: INTERNAL MEDICINE

## 2023-10-30 PROCEDURE — 7100000010 HC PHASE II RECOVERY - FIRST 15 MIN: Performed by: INTERNAL MEDICINE

## 2023-10-30 PROCEDURE — 7100000011 HC PHASE II RECOVERY - ADDTL 15 MIN: Performed by: INTERNAL MEDICINE

## 2023-10-30 PROCEDURE — 3700000000 HC ANESTHESIA ATTENDED CARE: Performed by: INTERNAL MEDICINE

## 2023-10-30 PROCEDURE — 6360000002 HC RX W HCPCS: Performed by: NURSE ANESTHETIST, CERTIFIED REGISTERED

## 2023-10-30 PROCEDURE — 88305 TISSUE EXAM BY PATHOLOGIST: CPT

## 2023-10-30 PROCEDURE — 3700000001 HC ADD 15 MINUTES (ANESTHESIA): Performed by: INTERNAL MEDICINE

## 2023-10-30 PROCEDURE — 2709999900 HC NON-CHARGEABLE SUPPLY: Performed by: INTERNAL MEDICINE

## 2023-10-30 PROCEDURE — 2580000003 HC RX 258: Performed by: INTERNAL MEDICINE

## 2023-10-30 RX ORDER — LIDOCAINE HYDROCHLORIDE 20 MG/ML
INJECTION, SOLUTION INFILTRATION; PERINEURAL PRN
Status: DISCONTINUED | OUTPATIENT
Start: 2023-10-30 | End: 2023-10-30 | Stop reason: SDUPTHER

## 2023-10-30 RX ORDER — KETAMINE HCL IN NACL, ISO-OSM 100MG/10ML
SYRINGE (ML) INJECTION PRN
Status: DISCONTINUED | OUTPATIENT
Start: 2023-10-30 | End: 2023-10-30 | Stop reason: SDUPTHER

## 2023-10-30 RX ADMIN — LIDOCAINE HYDROCHLORIDE 100 MG: 20 INJECTION, SOLUTION INFILTRATION; PERINEURAL at 09:09

## 2023-10-30 RX ADMIN — PROPOFOL 100 MG: 10 INJECTION, EMULSION INTRAVENOUS at 09:12

## 2023-10-30 RX ADMIN — Medication 25 MG: at 09:09

## 2023-10-30 RX ADMIN — PROPOFOL 80 MG: 10 INJECTION, EMULSION INTRAVENOUS at 09:09

## 2023-10-30 RX ADMIN — SODIUM CHLORIDE: 9 INJECTION, SOLUTION INTRAVENOUS at 08:11

## 2023-10-30 ASSESSMENT — LIFESTYLE VARIABLES: SMOKING_STATUS: 0

## 2023-10-30 ASSESSMENT — COPD QUESTIONNAIRES: CAT_SEVERITY: NO INTERVAL CHANGE

## 2023-10-30 ASSESSMENT — PAIN - FUNCTIONAL ASSESSMENT: PAIN_FUNCTIONAL_ASSESSMENT: 0-10

## 2023-10-30 ASSESSMENT — PAIN SCALES - GENERAL: PAINLEVEL_OUTOF10: 0

## 2023-10-30 NOTE — H&P
[place] [address]                       PREOPERATIVE HISTORY AND PHYSICAL    PATIENT NAME: Cameron Walter                     :        1970  MED REC NO:   [id number]                           ROOM:  ACCOUNT NO:   [de-identified]                           ADMIT DATE: 10/30/2023  PROVIDER:     Farrukh Riley M.D.    Dakota Jaye OF PROCEDURE:  10/30/2023    PROCEDURE:  Colonoscopy. INDICATION:  The patient is a 78-year-old pleasant male who has had  diarrhea, loose watery bowel movements, lower abdominal pain, cramping  type of pain. He is undergoing further evaluation. PAST MEDICAL HISTORY:  Chronic diarrhea, COVID 19 positive, coronary  artery disease, chronic diastolic congestive heart failure, gait  disturbance, bipolar disorder, physical disability, type 2 diabetes  mellitus, obstructive sleep apnea, and neuropathy. MEDICATIONS:  Reviewed. PHYSICAL EXAMINATION:  VITAL SIGNS:  Pulse 94, respiratory rate 18, blood pressure 139/60. HEART:  Regular. Normal S1 and S2. No S3.  LUNGS:  [place] air entry bilaterally. ABDOMEN:  Soft. Normoactive bowel sounds. Nontender. Not distended. IMPRESSION:  A 52year-old pleasant male who has chronic diarrhea. He  is undergoing further evaluation. RECOMMENDATIONS:  Keep the patient nothing by mouth. Proceed with  colonoscopy as planned. The risks including but not limited to  perforation, bleeding, infection, reaction to medicine, very slight  chance of missing significant lesions. The patient expressed his  understanding. Further plans pending the above test results.         Farrukh Riley M.D.    D: 10/30/2023 9:06:45       T: 10/30/2023 9:09:03     [email or web address]  Job#: [id number]     Doc#: [id number]    CC:

## 2023-10-30 NOTE — ANESTHESIA PRE PROCEDURE
Department of Anesthesiology  Preprocedure Note       Name:  Bonnie Orozco   Age:  52 y.o.  :  1970                                          MRN:  [id number]         Date:  10/30/2023      Surgeon: Army Elias):  Gaviota Donovan MD    Procedure: Procedure(s):  COLONOSCOPY    Medications prior to admission:   Prior to Admission medications    Medication Sig Start Date End Date Taking? Authorizing Provider   Dulaglutide (TRULICITY) 4.5 KD/8.4JJ SOPN INJECT 4.5 MG INTO THE SKIN ONCE A WEEK . 10/26/23   Ferny Stubbs DO   empagliflozin (JARDIANCE) 25 MG tablet TAKE 1 TABLET BY MOUTH ONCE DAILY. 10/26/23   David Mariee DO   Insulin Glargine, 2 Unit Dial, (TOUJEO MAX SOLOSTAR) 300 UNIT/ML SOPN INJECT 35 UNITS IN THE MORNING, 35UNITS IN THE EVENING. EVERY 10 DAYS INCREASE BY 5 UNITS BOTH TIMES, TO GET MORNING GLUCOSE 150-200. 10/26/23   Ferny Stubbs DO   gabapentin (NEURONTIN) 800 MG tablet Take 1 tablet by mouth 3 times daily for 90 days.  10/23/23 1/21/24  MARILYN Dinh CNP   insulin lispro (HUMALOG KWIKPEN U-200) 200 UNIT/ML SOPN pen 35 units Plus scale before meals; only takes if BG >200 - Max dose 160 units per day     States not doing sliding scale 10/23/23   Juan Hope APRN - CNP   gabapentin (NEURONTIN) 600 MG tablet 1 tab(s) orally 3 times a day for 90 days  Patient not taking: Reported on 10/13/2023 9/27/22   Provider, MD Minesh   metoprolol tartrate (LOPRESSOR) 25 MG tablet Take 1 tablet by mouth 2 times daily Re start tomorrow evening 2/3 23   Juan Hope APRN - CNP   omeprazole (PRILOSEC) 40 MG delayed release capsule Take 1 capsule by mouth every morning (before breakfast) 23   Juan Hope APRN - CNP   atorvastatin (LIPITOR) 80 MG tablet Take 1 tablet by mouth at bedtime 23   MARILYN Dinh CNP   isosorbide mononitrate (IMDUR) 30 MG extended release tablet TAKE ONE TABLET BY MOUTH ONCE A DAY 23   Osvaldo Acosta DO   ketoconazole

## 2023-10-30 NOTE — PROGRESS NOTES
Colonoscopy completed, pt tolerated well. Biopsies taken, 1 jar labeled and sent to lab. Photos taken. Scope  used.

## 2023-10-30 NOTE — BRIEF OP NOTE
Brief Postoperative Note      Patient: Maurice Austin  YOB: 1970  MRN: [id number]    Date of Procedure: 10/30/2023    Pre-Op Diagnosis Codes:     * Diarrhea, unspecified type [R19.7]     * Lower abdominal pain [R10.30]    Post-Op Diagnosis:  normal colon       Procedure(s):  COLONOSCOPY WITH BIOPSY    Surgeon(s):  Mirna Zavala MD    Assistant:  * No surgical staff found *    Anesthesia: Monitor Anesthesia Care    Estimated Blood Loss (mL): Minimal    Complications: None    Specimens:   ID Type Source Tests Collected by Time Destination   A : Bx random r/o microscopic colitis Tissue Colon SURGICAL PATHOLOGY Mirna Zavala MD 10/30/2023 9062        Implants:  * No implants in log *      Drains: * No LDAs found *    Findings: normal colon biopsies obtained.       Electronically signed by Mirna Zavala MD on 10/30/2023 at 9:33 AM

## 2023-10-30 NOTE — OP NOTE
[place] [address]                                OPERATIVE REPORT    PATIENT NAME: Cameron Walter                     :        1970  MED REC NO:   [id number]                           ROOM:  ACCOUNT NO:   [de-identified]                           ADMIT DATE: 10/30/2023  PROVIDER:     RICK Taylor Jaye OF PROCEDURE:  10/30/2023    SURGEON:  Farrukh Riley MD    PROCEDURE:  Colonoscopy. INDICATION:  A 52year-old pleasant male complains of chronic diarrhea. He is undergoing further evaluation. Please see my brief history for  details and for physical examination. ASA classification as per Anesthesia. Please see Anesthesia note for  details. INSTRUMENT:  PCF-H190AL pediatric colonoscope. PHOTOGRAPHS:  Yes. BIOPSIES:  Yes. ESTIMATED BLOOD LOSS:  Less than 10 mL. PREP:  Bowel prep score 6/9 of the [facility] Prep Score. CONSENT:  Procedure indications and complications including but not  limited to perforation, bleeding, infection, adverse reaction to  medicine, very slight chance of missing significant lesions with the  patient and the patient expressed his understanding and a written  consent was obtained. DESCRIPTION OF THE PROCEDURE:  The patient was placed in the left  lateral decubitus position in the endo room #13. The patient was placed  on appropriate monitoring of vitals including blood pressure, heart rate  and pulse ox. After the rectal examination, after the adequate total  intravenous anesthesia was given by Anesthesia, the PCF-H190AL pediatric  colonoscope was inserted into the rectum and advanced up to the cecum  without any difficulty and terminal ileum was intubated. On careful  inspection and on withdrawal of the scope, the terminal ileum looked  normal as shown picture #A3. Appendiceal orifice, cecum looked normal  as shown in picture #A1 and 2.   Visualized ascending

## 2023-10-30 NOTE — DISCHARGE INSTRUCTIONS
HIGH FIBER DIET  FIBER SUPPLEMENTS  CALL OUR OFFICE IN < 2 WKS FOR BIOPSY RESULTS  FOLLOW UP COLON IN 10 YRS  ULTRASOUND OF LIVER AND PANCREAS  FOLLOW UP WITH US IN 6 WKS.

## 2023-10-30 NOTE — ANESTHESIA POSTPROCEDURE EVALUATION
Department of Anesthesiology  Postprocedure Note    Patient: Josue Dash  MRN: [id number]  YOB: 1970  Date of evaluation: 10/30/2023      Procedure Summary     Date: 10/30/23 Room / Location: [facility] / [address] [place] [address]    Anesthesia Start: 2450 Anesthesia Stop: 8454    Procedure: COLONOSCOPY WITH BIOPSY Diagnosis:       Diarrhea, unspecified type      Lower abdominal pain      (Diarrhea, unspecified type [R19.7])      (Lower abdominal pain [R10.30])    Surgeons: Riri Nobles MD Responsible Provider: Vini Caballero DO    Anesthesia Type: MAC, TIVA ASA Status: 3          Anesthesia Type: No value filed.     Everette Phase I: Everette Score: 10    Everette Phase II:        Anesthesia Post Evaluation    Patient location during evaluation: bedside  Patient participation: complete - patient cannot participate  Level of consciousness: awake and alert  Pain score: 0  Airway patency: patent  Nausea & Vomiting: no vomiting and no nausea  Complications: no  Cardiovascular status: hemodynamically stable  Respiratory status: spontaneous ventilation and room air  Hydration status: stable  Pain management: satisfactory to patient

## 2023-10-30 NOTE — PROGRESS NOTES
Negra Yepez in phase 2 from [place] with . Family/ at bedside with pt. Cash Severino discussed findings with pt. Discharge insturctions discussed with pt. Discharge papers signed and given to pt. Pt discharged home/facility.

## 2023-11-07 DIAGNOSIS — E03.9 HYPOTHYROIDISM, UNSPECIFIED TYPE: ICD-10-CM

## 2023-11-07 RX ORDER — LEVOTHYROXINE SODIUM 0.05 MG/1
TABLET ORAL
Qty: 90 TABLET | Refills: 1 | Status: SHIPPED | OUTPATIENT
Start: 2023-11-07

## 2023-11-07 NOTE — TELEPHONE ENCOUNTER
Last ordered 4/20, disp 90, refill 1    Last visit 4/20  Patient notified he needs to schedule a f/u appt

## 2023-11-20 ENCOUNTER — HOSPITAL ENCOUNTER (OUTPATIENT)
Dept: MRI IMAGING | Age: 53
Discharge: HOME | End: 2023-11-20
Attending: ORTHOPAEDIC SURGERY
Payer: MEDICARE

## 2023-11-20 ENCOUNTER — OFFICE VISIT (OUTPATIENT)
Dept: CARDIOLOGY CLINIC | Age: 53
End: 2023-11-20
Payer: MEDICARE

## 2023-11-20 ENCOUNTER — HOSPITAL ENCOUNTER (OUTPATIENT)
Dept: ULTRASOUND IMAGING | Age: 53
Discharge: HOME | End: 2023-11-20
Attending: INTERNAL MEDICINE
Payer: MEDICARE

## 2023-11-20 VITALS
BODY MASS INDEX: 36.16 KG/M2 | HEIGHT: 66 IN | HEART RATE: 99 BPM | DIASTOLIC BLOOD PRESSURE: 79 MMHG | WEIGHT: 225 LBS | SYSTOLIC BLOOD PRESSURE: 153 MMHG

## 2023-11-20 DIAGNOSIS — Z79.4 TYPE 2 DIABETES MELLITUS WITH HYPERGLYCEMIA, WITH LONG-TERM CURRENT USE OF INSULIN (HCC): ICD-10-CM

## 2023-11-20 DIAGNOSIS — R06.02 SOB (SHORTNESS OF BREATH) ON EXERTION: ICD-10-CM

## 2023-11-20 DIAGNOSIS — E78.00 PURE HYPERCHOLESTEROLEMIA: ICD-10-CM

## 2023-11-20 DIAGNOSIS — Z95.820 S/P ANGIOPLASTY WITH STENT: ICD-10-CM

## 2023-11-20 DIAGNOSIS — E11.65 TYPE 2 DIABETES MELLITUS WITH HYPERGLYCEMIA, WITH LONG-TERM CURRENT USE OF INSULIN (HCC): ICD-10-CM

## 2023-11-20 DIAGNOSIS — I25.10 CORONARY ARTERY DISEASE INVOLVING NATIVE CORONARY ARTERY OF NATIVE HEART WITHOUT ANGINA PECTORIS: Primary | ICD-10-CM

## 2023-11-20 DIAGNOSIS — I10 ESSENTIAL HYPERTENSION: ICD-10-CM

## 2023-11-20 DIAGNOSIS — I50.32 CHRONIC DIASTOLIC CONGESTIVE HEART FAILURE (HCC): ICD-10-CM

## 2023-11-20 DIAGNOSIS — R22.31 MASS OF FINGER OF RIGHT HAND: ICD-10-CM

## 2023-11-20 PROCEDURE — 3077F SYST BP >= 140 MM HG: CPT | Performed by: INTERNAL MEDICINE

## 2023-11-20 PROCEDURE — G8417 CALC BMI ABV UP PARAM F/U: HCPCS | Performed by: INTERNAL MEDICINE

## 2023-11-20 PROCEDURE — 3017F COLORECTAL CA SCREEN DOC REV: CPT | Performed by: INTERNAL MEDICINE

## 2023-11-20 PROCEDURE — 3078F DIAST BP <80 MM HG: CPT | Performed by: INTERNAL MEDICINE

## 2023-11-20 PROCEDURE — G8482 FLU IMMUNIZE ORDER/ADMIN: HCPCS | Performed by: INTERNAL MEDICINE

## 2023-11-20 PROCEDURE — G8427 DOCREV CUR MEDS BY ELIG CLIN: HCPCS | Performed by: INTERNAL MEDICINE

## 2023-11-20 PROCEDURE — 76705 ECHO EXAM OF ABDOMEN: CPT

## 2023-11-20 PROCEDURE — 4004F PT TOBACCO SCREEN RCVD TLK: CPT | Performed by: INTERNAL MEDICINE

## 2023-11-20 PROCEDURE — 99214 OFFICE O/P EST MOD 30 MIN: CPT | Performed by: INTERNAL MEDICINE

## 2023-11-20 PROCEDURE — 73218 MRI UPPER EXTREMITY W/O DYE: CPT

## 2023-11-20 NOTE — PROGRESS NOTES
dysfunction). The pericardium was normal in appearance with no evidence of a pericardial   effusion. Signature    ----------------------------------------------------------------   Electronically signed by Heather Younger MD (Interpreting   physician) on 09/15/2020 at 05:55 PM         Conclu    Conclusions      Summary   Normal left ventricle size and systolic function. Ejection fraction was   estimated at 55 %. There were no regional left ventricular wall motion   abnormalities and wall thickness was within normal limits. Signature      ----------------------------------------------------------------   Electronically signed by Heather Younger MD (Interpreting   physician) on 11/25/2022 at 08:24 PM   ----------------------------------------------------------------sions      Summary   Mild concentric left ventricular hypertrophy. Left ventricle size is normal.   There were no regional wall motion abnormalities. Ejection fraction is visually estimated at 55%. IVC size is within normal limits with normal respiratory phasic changes. Signature      ----------------------------------------------------------------   Electronically signed by Elayne Pollard MD (Interpreting   physician) on 02/01/2023 at 05:27 PM   ----------------------------------------------------------------    Ekg 1/31/23  Sinus rhythm with 1st degree A-V block Otherwise normal ECG When compared with ECG of 31-JAN-2023 18:34, WV interval has increased Confirmed by Marcia Goodman MD, Ly Corey ([id number]) on 2/1/2023 7:29:08 PM    Ekg 7/18/23  Sinus  Rhythm  - occasional ectopic ventricular beat     Low voltage in limb leads.    -  Nonspecific T-abnormality. No acute abn  ABNORMAL       Assessment       Diagnosis Orders   1. Coronary artery disease involving native coronary artery of native heart without angina pectoris        2. Chronic diastolic congestive heart failure ([address])        3. Essential hypertension        4.  Pure hypercholesterolemia

## 2023-12-14 RX ORDER — LISINOPRIL 5 MG/1
5 TABLET ORAL DAILY
Qty: 30 TABLET | Refills: 5 | OUTPATIENT
Start: 2023-12-14

## 2023-12-19 ENCOUNTER — TELEPHONE (OUTPATIENT)
Dept: FAMILY MEDICINE CLINIC | Age: 53
End: 2023-12-19

## 2023-12-19 NOTE — TELEPHONE ENCOUNTER
Hello, you have a appointment with Juan Orellana on 12/27/2023 she will not be in office this day and we need to get this reschedule. Please call [phone]

## 2024-01-02 ENCOUNTER — OFFICE VISIT (OUTPATIENT)
Dept: FAMILY MEDICINE CLINIC | Age: 54
End: 2024-01-02
Payer: MEDICARE

## 2024-01-02 VITALS
HEART RATE: 95 BPM | RESPIRATION RATE: 16 BRPM | DIASTOLIC BLOOD PRESSURE: 70 MMHG | SYSTOLIC BLOOD PRESSURE: 142 MMHG | HEIGHT: 66 IN | OXYGEN SATURATION: 90 % | BODY MASS INDEX: 36.16 KG/M2 | WEIGHT: 225 LBS | TEMPERATURE: 98.7 F

## 2024-01-02 DIAGNOSIS — S78.111A ABOVE KNEE AMPUTATION OF RIGHT LOWER EXTREMITY (HCC): Primary | ICD-10-CM

## 2024-01-02 DIAGNOSIS — R53.81 PHYSICAL DEBILITY: ICD-10-CM

## 2024-01-02 DIAGNOSIS — Z79.4 TYPE 2 DIABETES MELLITUS WITH OTHER CIRCULATORY COMPLICATION, WITH LONG-TERM CURRENT USE OF INSULIN (HCC): ICD-10-CM

## 2024-01-02 DIAGNOSIS — F19.10 OTHER PSYCHOACTIVE SUBSTANCE ABUSE, UNCOMPLICATED (HCC): ICD-10-CM

## 2024-01-02 DIAGNOSIS — E66.01 SEVERE OBESITY (BMI 35.0-39.9) WITH COMORBIDITY (HCC): ICD-10-CM

## 2024-01-02 DIAGNOSIS — F31.81 BIPOLAR II DISORDER (HCC): ICD-10-CM

## 2024-01-02 DIAGNOSIS — E11.59 TYPE 2 DIABETES MELLITUS WITH OTHER CIRCULATORY COMPLICATION, WITH LONG-TERM CURRENT USE OF INSULIN (HCC): ICD-10-CM

## 2024-01-02 DIAGNOSIS — G62.9 NEUROPATHY: ICD-10-CM

## 2024-01-02 PROBLEM — U07.1 COVID-19 VIRUS INFECTION: Status: RESOLVED | Noted: 2022-11-24 | Resolved: 2024-01-02

## 2024-01-02 PROBLEM — E11.65 TYPE 2 DIABETES MELLITUS WITH HYPERGLYCEMIA, WITH LONG-TERM CURRENT USE OF INSULIN (HCC): Status: RESOLVED | Noted: 2019-02-15 | Resolved: 2024-01-02

## 2024-01-02 PROBLEM — K52.9 CHRONIC DIARRHEA: Status: RESOLVED | Noted: 2022-07-26 | Resolved: 2024-01-02

## 2024-01-02 PROCEDURE — G8482 FLU IMMUNIZE ORDER/ADMIN: HCPCS | Performed by: NURSE PRACTITIONER

## 2024-01-02 PROCEDURE — 3017F COLORECTAL CA SCREEN DOC REV: CPT | Performed by: NURSE PRACTITIONER

## 2024-01-02 PROCEDURE — 2022F DILAT RTA XM EVC RTNOPTHY: CPT | Performed by: NURSE PRACTITIONER

## 2024-01-02 PROCEDURE — G8427 DOCREV CUR MEDS BY ELIG CLIN: HCPCS | Performed by: NURSE PRACTITIONER

## 2024-01-02 PROCEDURE — 3077F SYST BP >= 140 MM HG: CPT | Performed by: NURSE PRACTITIONER

## 2024-01-02 PROCEDURE — 3078F DIAST BP <80 MM HG: CPT | Performed by: NURSE PRACTITIONER

## 2024-01-02 PROCEDURE — 4004F PT TOBACCO SCREEN RCVD TLK: CPT | Performed by: NURSE PRACTITIONER

## 2024-01-02 PROCEDURE — 3046F HEMOGLOBIN A1C LEVEL >9.0%: CPT | Performed by: NURSE PRACTITIONER

## 2024-01-02 PROCEDURE — 99214 OFFICE O/P EST MOD 30 MIN: CPT | Performed by: NURSE PRACTITIONER

## 2024-01-02 PROCEDURE — G8417 CALC BMI ABV UP PARAM F/U: HCPCS | Performed by: NURSE PRACTITIONER

## 2024-01-02 RX ORDER — GABAPENTIN 800 MG/1
800 TABLET ORAL 3 TIMES DAILY
Qty: 270 TABLET | Refills: 0 | Status: SHIPPED | OUTPATIENT
Start: 2024-01-02 | End: 2024-04-01

## 2024-01-02 ASSESSMENT — ENCOUNTER SYMPTOMS
RESPIRATORY NEGATIVE: 1
BACK PAIN: 0
ABDOMINAL PAIN: 0
ABDOMINAL DISTENTION: 0

## 2024-01-02 NOTE — PROGRESS NOTES
[facility]  [address]  [place] [address]  Dept: [phone]  Dept Fax: [phone]  Loc: [phone]    Jamie Spangler is a 53 y.o. malewho presents today for his medical conditions/complaints as noted below.  Jamie rFankel c/o of Follow-up      :     HPI    Diabetes Type 2    Glucose control:   Does patient check blood glucoses at home?  Yes  Report of hypoglycemia: yes 1 time  Follows with Endocrinology for treatment   Pt states he has been diabetic for 25 years,  Mother also had Diabetes.   Lab Results   Component Value Date    LABA1C 5.6 09/26/2023     Lab Results   Component Value Date     06/30/2016       Symptoms  Polyuria, Polydipsia or Polyphagia?   No  Chest Pain, SOB, or Palpitations? -  Yes - pt does have occasional chest pain at times, sees cardiology   New Vision complaints? No  Paresthesias of the extremities?  Yes - he does have diabetic neuropathy and takes gabapentin 800 mg tid for this with does help     Medications  Current medication were reviewed.  Compliant with medications?  yes  Medication side effects?  No  On ACE-I or ARB?  Yes  On antiplatelet therapy?  Yes  On Statin?  Yes    Last Diabetic Eye Exam: due for one and has it scheduled.   Pt currently taking humalog 35 units plus scale, uses it daily  has had hypoglycemia 2 times, toujeo 34 bid units , jardiance 25 mg daily, trulicity 4.5 mg weekly        Needs a new wheelchair  Exercise  Exercise? No  Wt Readings from Last 3 Encounters:   08/27/23 225 lb 9.6 oz (102.3 kg)   08/18/23 225 lb (102.1 kg)   05/26/23 220 lb (99.8 kg)       Diet discipline?:  Low salt, fat, sugar diet?  No    Pt has a AKA of RLE  Pt has had a venous stasis ulcer of LLE but healed now  Does follow with podiatry    History of MI and CAD  Does  follow with CHF clinic and internal medicine.   Has intermittent CP  Has upcoming stress test ordered per Cardiology   Heart cath in 2.2023  HTN

## 2024-01-08 ENCOUNTER — HOSPITAL ENCOUNTER (OUTPATIENT)
Age: 54
Discharge: HOME | End: 2024-01-08
Payer: MEDICARE

## 2024-01-08 DIAGNOSIS — R39.12 WEAK URINE STREAM: ICD-10-CM

## 2024-01-08 DIAGNOSIS — I10 ESSENTIAL HYPERTENSION: ICD-10-CM

## 2024-01-08 DIAGNOSIS — E11.65 TYPE 2 DIABETES MELLITUS WITH HYPERGLYCEMIA, WITH LONG-TERM CURRENT USE OF INSULIN (HCC): ICD-10-CM

## 2024-01-08 DIAGNOSIS — I25.10 CORONARY ARTERY DISEASE INVOLVING NATIVE CORONARY ARTERY OF NATIVE HEART, UNSPECIFIED WHETHER ANGINA PRESENT: ICD-10-CM

## 2024-01-08 DIAGNOSIS — Z79.4 TYPE 2 DIABETES MELLITUS WITH OTHER CIRCULATORY COMPLICATION, WITH LONG-TERM CURRENT USE OF INSULIN (HCC): ICD-10-CM

## 2024-01-08 DIAGNOSIS — Z79.4 TYPE 2 DIABETES MELLITUS WITH HYPERGLYCEMIA, WITH LONG-TERM CURRENT USE OF INSULIN (HCC): ICD-10-CM

## 2024-01-08 DIAGNOSIS — R07.9 CHEST PAIN IN ADULT: ICD-10-CM

## 2024-01-08 DIAGNOSIS — E78.00 PURE HYPERCHOLESTEROLEMIA: ICD-10-CM

## 2024-01-08 DIAGNOSIS — Z95.820 S/P ANGIOPLASTY WITH STENT: ICD-10-CM

## 2024-01-08 DIAGNOSIS — E11.59 TYPE 2 DIABETES MELLITUS WITH OTHER CIRCULATORY COMPLICATION, WITH LONG-TERM CURRENT USE OF INSULIN (HCC): ICD-10-CM

## 2024-01-08 DIAGNOSIS — I50.32 CHRONIC DIASTOLIC CONGESTIVE HEART FAILURE (HCC): ICD-10-CM

## 2024-01-08 DIAGNOSIS — R42 VERTIGO: ICD-10-CM

## 2024-01-08 LAB
ALBUMIN SERPL BCG-MCNC: 4.2 G/DL (ref 3.5–5.1)
ALP SERPL-CCNC: 74 U/L (ref 38–126)
ALT SERPL W/O P-5'-P-CCNC: 15 U/L (ref 11–66)
ANION GAP SERPL CALC-SCNC: 11 MEQ/L (ref 8–16)
AST SERPL-CCNC: 22 U/L (ref 5–40)
BILIRUB CONJ SERPL-MCNC: < 0.2 MG/DL (ref 0–0.3)
BILIRUB SERPL-MCNC: 0.4 MG/DL (ref 0.3–1.2)
BUN SERPL-MCNC: 17 MG/DL (ref 7–22)
CALCIUM SERPL-MCNC: 9.5 MG/DL (ref 8.5–10.5)
CHLORIDE SERPL-SCNC: 104 MEQ/L (ref 98–111)
CHOLESTEROL, FASTING: 153 MG/DL (ref 100–199)
CO2 SERPL-SCNC: 25 MEQ/L (ref 23–33)
CREAT SERPL-MCNC: 1 MG/DL (ref 0.4–1.2)
DEPRECATED MEAN GLUCOSE BLD GHB EST-ACNC: 123 MG/DL (ref 70–126)
GFR SERPL CREATININE-BSD FRML MDRD: > 60 ML/MIN/1.73M2
GLUCOSE SERPL-MCNC: 76 MG/DL (ref 70–108)
HBA1C MFR BLD HPLC: 6.1 % (ref 4.4–6.4)
HDLC SERPL-MCNC: 34 MG/DL
LDLC SERPL CALC-MCNC: 87 MG/DL
MAGNESIUM SERPL-MCNC: 2.3 MG/DL (ref 1.6–2.4)
POTASSIUM SERPL-SCNC: 3.9 MEQ/L (ref 3.5–5.2)
PROT SERPL-MCNC: 7.8 G/DL (ref 6.1–8)
PSA SERPL-MCNC: 0.93 NG/ML (ref 0–1)
SODIUM SERPL-SCNC: 140 MEQ/L (ref 135–145)
TRIGLYCERIDE, FASTING: 159 MG/DL (ref 0–199)

## 2024-01-08 PROCEDURE — 80061 LIPID PANEL: CPT

## 2024-01-08 PROCEDURE — 80053 COMPREHEN METABOLIC PANEL: CPT

## 2024-01-08 PROCEDURE — 83036 HEMOGLOBIN GLYCOSYLATED A1C: CPT

## 2024-01-08 PROCEDURE — 83735 ASSAY OF MAGNESIUM: CPT

## 2024-01-08 PROCEDURE — 84153 ASSAY OF PSA TOTAL: CPT

## 2024-01-08 PROCEDURE — 36415 COLL VENOUS BLD VENIPUNCTURE: CPT

## 2024-01-08 PROCEDURE — 86337 INSULIN ANTIBODIES: CPT

## 2024-01-08 PROCEDURE — 82248 BILIRUBIN DIRECT: CPT

## 2024-01-08 PROCEDURE — 84681 ASSAY OF C-PEPTIDE: CPT

## 2024-01-09 ENCOUNTER — TELEPHONE (OUTPATIENT)
Dept: FAMILY MEDICINE CLINIC | Age: 54
End: 2024-01-09

## 2024-01-09 LAB — C PEPTIDE SERPL-MCNC: 3.1 NG/ML (ref 1.1–4.4)

## 2024-01-09 NOTE — TELEPHONE ENCOUNTER
----- Message from MARILYN Garcia - CNP sent at 1/8/2024  7:44 PM EST -----  Let pt know PSA back and non normal range

## 2024-01-12 LAB — INSULIN HUMAN AB SER-ACNC: NORMAL

## 2024-01-15 ENCOUNTER — TELEPHONE (OUTPATIENT)
Dept: FAMILY MEDICINE CLINIC | Age: 54
End: 2024-01-15

## 2024-01-15 NOTE — TELEPHONE ENCOUNTER
Left message on answering machine requesting pt to call back at earliest convenience.       Not marked on HIPAA to leave normal test results in VM

## 2024-01-15 NOTE — TELEPHONE ENCOUNTER
----- Message from MARILYN Garcia - CNP sent at 1/14/2024  4:02 PM EST -----  Let pt know all labs are stable and in appropriate range,

## 2024-01-24 DIAGNOSIS — G62.9 NEUROPATHY: ICD-10-CM

## 2024-01-24 RX ORDER — GABAPENTIN 800 MG/1
TABLET ORAL
Qty: 270 TABLET | Refills: 0 | Status: SHIPPED | OUTPATIENT
Start: 2024-01-24 | End: 2024-04-23

## 2024-01-24 NOTE — TELEPHONE ENCOUNTER
Future Appointments   Date Time Provider Department Center   1/29/2024  2:15 PM Rayray Mitchell MD ENDO [facility] [place]   3/28/2024 11:00 AM Melisa Haley [facility]   4/3/2024  3:00 PM Juan Orellana APRN - CNP [facility]   6/3/2024  3:15 PM David Hong MD N [facility]

## 2024-01-29 ENCOUNTER — OFFICE VISIT (OUTPATIENT)
Age: 54
End: 2024-01-29
Payer: MEDICARE

## 2024-01-29 VITALS
BODY MASS INDEX: 36.32 KG/M2 | SYSTOLIC BLOOD PRESSURE: 152 MMHG | DIASTOLIC BLOOD PRESSURE: 84 MMHG | HEIGHT: 66 IN | HEART RATE: 91 BPM

## 2024-01-29 DIAGNOSIS — E11.65 TYPE 2 DIABETES MELLITUS WITH HYPERGLYCEMIA, WITH LONG-TERM CURRENT USE OF INSULIN (HCC): ICD-10-CM

## 2024-01-29 DIAGNOSIS — E66.09 CLASS 2 OBESITY DUE TO EXCESS CALORIES WITH BODY MASS INDEX (BMI) OF 36.0 TO 36.9 IN ADULT, UNSPECIFIED WHETHER SERIOUS COMORBIDITY PRESENT: ICD-10-CM

## 2024-01-29 DIAGNOSIS — Z79.4 TYPE 2 DIABETES MELLITUS WITH HYPERGLYCEMIA, WITH LONG-TERM CURRENT USE OF INSULIN (HCC): ICD-10-CM

## 2024-01-29 PROCEDURE — G8482 FLU IMMUNIZE ORDER/ADMIN: HCPCS | Performed by: INTERNAL MEDICINE

## 2024-01-29 PROCEDURE — 3077F SYST BP >= 140 MM HG: CPT | Performed by: INTERNAL MEDICINE

## 2024-01-29 PROCEDURE — G8427 DOCREV CUR MEDS BY ELIG CLIN: HCPCS | Performed by: INTERNAL MEDICINE

## 2024-01-29 PROCEDURE — 3044F HG A1C LEVEL LT 7.0%: CPT | Performed by: INTERNAL MEDICINE

## 2024-01-29 PROCEDURE — 4004F PT TOBACCO SCREEN RCVD TLK: CPT | Performed by: INTERNAL MEDICINE

## 2024-01-29 PROCEDURE — 99214 OFFICE O/P EST MOD 30 MIN: CPT | Performed by: INTERNAL MEDICINE

## 2024-01-29 PROCEDURE — 3017F COLORECTAL CA SCREEN DOC REV: CPT | Performed by: INTERNAL MEDICINE

## 2024-01-29 PROCEDURE — 2022F DILAT RTA XM EVC RTNOPTHY: CPT | Performed by: INTERNAL MEDICINE

## 2024-01-29 PROCEDURE — G8417 CALC BMI ABV UP PARAM F/U: HCPCS | Performed by: INTERNAL MEDICINE

## 2024-01-29 PROCEDURE — 3079F DIAST BP 80-89 MM HG: CPT | Performed by: INTERNAL MEDICINE

## 2024-01-29 PROCEDURE — 95251 CONT GLUC MNTR ANALYSIS I&R: CPT | Performed by: INTERNAL MEDICINE

## 2024-01-29 RX ORDER — DULAGLUTIDE 4.5 MG/.5ML
INJECTION, SOLUTION SUBCUTANEOUS
Qty: 6 ML | Refills: 1 | Status: SHIPPED | OUTPATIENT
Start: 2024-01-29

## 2024-01-29 NOTE — PATIENT INSTRUCTIONS
Take  toujeo 36 units  twice a day    Take mealtime HUMALOG as follows:  Breakfast: 0 units  Lunch: 5 units  Dinner: 0 units    Plus sliding scale HUMALOG as follows:  Below 70, treat for hypoglycemia  , no additional insulin  201-250, 2 units  251-300, 4 units  301-350, 6 units  351-400, 8 units  Above 400, call MD

## 2024-01-29 NOTE — PROGRESS NOTES
Jamie Spangler is a 54 y.o. , male who comes for f/u for Diabetes Mellitus Type 2  PCP: Juan Orellana, MARILYN - CNP    HPI   This patient was diagnosed with diabetes mellitus 31 years ago.   Current therapy includes  36/36 , Trulicity 4.5 mg weekly, Jardiance 25 mg daily.  The patient has been using sliding scale Humalog as needed.  He is currently following diet.  Patient has been going to [employer] for exercises.  He checks blood sugars 4 times a day using Dexcom CGMS.  Average blood glucose is 156 mg/dL with a glucose management indicator of 7%.  The patient was within the target range 73% of the time.  In terms of symptoms he reports burning sensations in the feet and some tingling and numbness in the toes.   He is  current on eye exam, and there is h/o right foot ulcer with below-knee amputation in 2016, followed by above-knee amputation in 2019 .   Patient is being treated for hypercholesterolemia.  Patient is being treated for  hypertension.  This patient also has hypothyroidism, treated with 50 mcg of Synthroid daily.  Reports no signs or symptoms of hypothyroidism  Past Medical History:   Diagnosis Date    Atherosclerotic heart disease of native coronary artery with unspecified angina pectoris 1/18/2022    Bipolar 2 disorder (HCC)     previously followed with Dr. Taylor Dueñas and Augie Morel in [place]    BPH (benign prostatic hyperplasia)     Chronic diastolic congestive heart failure (HCC) 3/1/2023    COPD (chronic obstructive pulmonary disease) ([facility])     Diabetes mellitus type 2, uncontrolled     HgbA1c on 4/2/2019 was 9.1.    Diabetic peripheral neuropathy (HCC)     Diabetic polyneuropathy (HCC)     Diabetic ulcer of right foot associated with type 2 diabetes mellitus (HCC) 12/10/2015    Essential hypertension     \"never been on b/p medication that I know of\"    GERD (gastroesophageal reflux disease)     Hammer toe of left foot     Heart murmur     denies any chest pain or palpitations

## 2024-01-29 NOTE — PROGRESS NOTES
CGMSINTERPRETATION    CGMS interpretation:  The patient is checking blood sugars 4 times a day using Dexcom continuous glucose monitoring system.  His download includes data from 1/16/2024 through 1/29/2024 and is sufficient for interpretation.  The CGM was active 84.9% of the time.  Average blood glucose is 156 mg/dL with a coefficient of variation of 36.2% and glucose management indicator of 7.0%  The patient was within the target range 73 percent of the time.  Hypoglycemia:  Blood glucose range between 54 mg/dL and 70mg/dL:  %  Blood glucose is below 54 mg/dL: Less than 1%  Hyperglycemia:  Blood glucoses between 181 and 250 mg/dL: 18%  Blood glucoses above 250 mg/dL: 8%  In summary, this patient is having postprandial hyperglycemia at lunchtime.  Recommendations: Please refer to the assessment and plan section

## 2024-02-08 ENCOUNTER — TELEPHONE (OUTPATIENT)
Dept: FAMILY MEDICINE CLINIC | Age: 54
End: 2024-02-08

## 2024-02-08 DIAGNOSIS — Z97.10 EMPLOYS PROSTHETIC LEG: ICD-10-CM

## 2024-02-08 DIAGNOSIS — S78.111A ABOVE KNEE AMPUTATION OF RIGHT LOWER EXTREMITY (HCC): Primary | ICD-10-CM

## 2024-02-08 NOTE — TELEPHONE ENCOUNTER
Diagnosis Orders   1. Above knee amputation of right lower extremity (HCC)  [facility]      2. Employs prosthetic leg  [facility]

## 2024-02-08 NOTE — TELEPHONE ENCOUNTER
Pt requesting referral to [facility]  PT  to help with walking with his prosthesis

## 2024-02-14 NOTE — PROGRESS NOTES
Pt admitted to  6K26 in a wheelchair. Complaints: Chest pain / discomfort. IV site free of s/s of infection or infiltration. Vital signs obtained. Assessment and data collection initiated. Two nurse skin assessment performed by Cierra FIGUEREDO and Vahid Rosen RN. Oriented to room. Policies and procedures for 6 explained. Cierra FIGUEREDO discussed hourly rounding with patient addressing 5 P's. Fall prevention and safety brochure discussed with patient. Bed alarm on. Call light in reach. within normal limits

## 2024-02-21 ENCOUNTER — TELEPHONE (OUTPATIENT)
Dept: FAMILY MEDICINE CLINIC | Age: 54
End: 2024-02-21

## 2024-02-21 DIAGNOSIS — Z89.519 STATUS POST BELOW KNEE AMPUTATION, UNSPECIFIED LATERALITY (HCC): Primary | ICD-10-CM

## 2024-02-21 NOTE — TELEPHONE ENCOUNTER
Pt called asking for a script to be faxed to his insurance [facility] @ [phone].  Pt states he was told there was nothing else needed but the scripts.    Please advise

## 2024-02-21 NOTE — TELEPHONE ENCOUNTER
Sorry, the script is for an electric scooter.  Pt states this needs to be faxed to the number below and that is all that was needed per [facility].

## 2024-02-21 NOTE — TELEPHONE ENCOUNTER
If pt wanting an electric scooter, will need an eval with the OT department for scooter eval. We cna not just send in a script. He will also need to send  the paperwork form the scooter company to have filled out. They can fax it to us if he gives them our fax number. I will place the referral for the exam and our OT will contact him for a visit.

## 2024-02-22 ENCOUNTER — HOSPITAL ENCOUNTER (OUTPATIENT)
Dept: PHYSICAL THERAPY | Age: 54
Setting detail: THERAPIES SERIES
Discharge: HOME | End: 2024-02-22
Payer: MEDICARE

## 2024-02-22 PROCEDURE — 97163 PT EVAL HIGH COMPLEX 45 MIN: CPT

## 2024-02-22 NOTE — TELEPHONE ENCOUNTER
Pt called with the phone number to his insurance for the scooter.  [phone].      [facility], spoke with Víctor, he stated they can verify if the  the pt choose' would need a PA or not. The venders given covered under pt's insurance is [facility] medical equip and [employer].  Víctor, from [facility], also verified pt is needing the OT and face to face with PCP in order to qualify for the electric scooter.    Ref#: [id number]      Pt has been informed and verbally understood.    Future Appointments   Date Time Provider Department Center   3/1/2024  9:15 AM Eleuterio Marrero, PT [facility]   3/5/2024 10:00 AM Paris Mckinley, ROBIN [facility]   3/7/2024 11:00 AM Lynda Ford OT [facility]   3/12/2024 10:30 AM Eleuterio Marrero, PT [facility]   3/19/2024 10:30 AM Paris Mckinley, ROBIN [facility]   3/26/2024  8:45 AM Eleuterio Marrero, PT [facility]   3/28/2024 11:00 AM Melisa Haley RPH [facility] Physic [facility] [place]   4/3/2024  3:00 PM Juan Orellana APRN - CNP Fam Med [facility] [place]   4/30/2024  3:15 PM Rayray Mitchell MD ENDO [facility] [place]   6/3/2024  3:15 PM David Hong MD N SRPX Heart [facility] [place]

## 2024-02-22 NOTE — TELEPHONE ENCOUNTER
Spoke with pt, he is ok to do the OT, order faxed to OP therapy.  Pt is also calling [facility] to get the company the electric scooter would be ordered through and will call the office with that information.

## 2024-02-22 NOTE — TELEPHONE ENCOUNTER
Patient called back and left message on machine stating he called [facility] and they said that he needs a script and a note saying why he needs a scooter faxed.[phone]

## 2024-02-22 NOTE — PROGRESS NOTES
prosthetic.   Pt to improve LLE strength to >= 5-/5 to allow for improved Wbing activity tolerance.      Patient Education:   [x]  HEP/Education Completed: Plan of Care, Goals, body mechanics, HEP, skin checks, discussing concerns regarding current suspension with prosthetist.   [employer] Access Code: [email or web address]   []  No new Education completed  []  Reviewed Prior HEP      [x]  Patient verbalized and/or demonstrated understanding of education provided.  []  Patient unable to verbalize and/or demonstrate understanding of education provided.  Will continue education.  []  Barriers to learning:     PLAN:  Treatment Recommendations: Strengthening, Range of Motion, Balance Training, Functional Mobility Training, Transfer Training, Endurance Training, Gait Training, Stair Training, Neuromuscular Re-education, Manual Therapy - Joint Manipulation, Pain Management, Home Exercise Program, and Patient Education    [x]  Plan of care initiated.  Plan to see patient 1-2 times per week for 12 weeks to address the treatment planned outlined above.  []  Continue with current plan of care  []  Modify plan of care as follows:    []  Hold pending physician visit  []  Discharge    Time In 1310   Time Out 1406   Timed Code Minutes: 5 min   Total Treatment Time: 56 min     Electronically Signed by: Eleuterio Marrero PT

## 2024-03-01 ENCOUNTER — APPOINTMENT (OUTPATIENT)
Dept: PHYSICAL THERAPY | Age: 54
End: 2024-03-01
Payer: MEDICARE

## 2024-03-04 ENCOUNTER — TELEPHONE (OUTPATIENT)
Dept: FAMILY MEDICINE CLINIC | Age: 54
End: 2024-03-04

## 2024-03-04 DIAGNOSIS — S88.919A AMPUTATION OF LOWER LIMB (HCC): Primary | ICD-10-CM

## 2024-03-04 NOTE — TELEPHONE ENCOUNTER
Pt called asking for a script for a replacement socket for his prosthetic leg. The order will need to be faxed to [facility] at [phone].

## 2024-03-07 ENCOUNTER — HOSPITAL ENCOUNTER (OUTPATIENT)
Dept: OCCUPATIONAL THERAPY | Age: 54
Setting detail: THERAPIES SERIES
Discharge: HOME | End: 2024-03-07
Payer: MEDICARE

## 2024-03-07 PROCEDURE — 97542 WHEELCHAIR MNGMENT TRAINING: CPT

## 2024-03-07 PROCEDURE — 97166 OT EVAL MOD COMPLEX 45 MIN: CPT

## 2024-03-07 NOTE — DISCHARGE SUMMARY
** PLEASE SIGN, DATE AND TIME CERTIFICATION BELOW AND RETURN TO [facility] OUTPATIENT REHABILITATION (FAX #: [phone]).  ATTEST/CO-SIGN IF ACCESSING VIA [employer].  THANK YOU.**    I certify that I have examined the patient below and determined that Physical Medicine and Rehabilitation service is necessary and that I approve the established plan of care for up to 90 days or as specifically noted.  Attestation, signature or co-signature of physician indicates approval of certification requirements.    ________________________ ____________ __________  Physician Signature   Date   Time  [facility]  OUTPATIENT OCCUPATIONAL THERAPY  GENERAL EVALUATION  WHEELCHAIR NECESSITY FORM    Date: 3/7/2024  Patient Name:  Jamie Spangler  : 1970  MRN: [id number]  CSN: [id number]    Referring Practitioner Juan Orellana APRN - *   Diagnosis Acquired absence of unspecified leg below knee [Z89.519]    Treatment Diagnosis Z74.09 Other reduced mobility   Date of Evaluation 3/7/24    Additional Pertinent History Pt. Presents today for evaluation for a power wheelchair. Pt. Currently using a manual wheelchair  that he just got 2 months ago as a rental and no face to face assessment was done by the vendor. Pt. States that he is not able to continue to functionally use this manual chair due to drop foot on left LE. Pt. States he has also had to have small toe amputated from left foot due to diabetes and surgery to stretch his gastrocnemius. Pt. Afraid that he will develop further issues with his left LE if he had to use a walker and states his left LE is weak from having not ambulated for several years. Pt. Also has diabetic neuropathy in hands that makes it difficult to push the wheels. Pt. has not walked since his Right AKA in 2019. Pt. Has diabetes which contributed to non healing wounds on right foot, subsequent BKA with subsequent infection, and then finally an AKA in 2019. Pt. Has a LE prosthesis

## 2024-03-12 ENCOUNTER — HOSPITAL ENCOUNTER (OUTPATIENT)
Dept: PHYSICAL THERAPY | Age: 54
Setting detail: THERAPIES SERIES
Discharge: HOME | End: 2024-03-12
Payer: MEDICARE

## 2024-03-12 PROCEDURE — 97110 THERAPEUTIC EXERCISES: CPT

## 2024-03-12 NOTE — PROGRESS NOTES
[facility]  PHYSICAL THERAPY  [] EVALUATION  [x] DAILY NOTE (LAND) [] DAILY NOTE (AQUATIC ) [] PROGRESS NOTE [] DISCHARGE NOTE    [x] OUTPATIENT REHABILITATION CENTER [facility]   [] [place] [facility]    [] [facility]   [] [facility]    Date: 3/12/2024  Patient Name:  Jamie Spangler  : 1970  MRN: [id number]  CSN: [id number]    Referring Practitioner Wood Miranda DO    Diagnosis Complete traumatic amputation at level between right hip and knee, initial encounter  Presence of artificial limb (complete) (partial), unspecified    Treatment Diagnosis M62.81 Generalized Muscle Weakness  R26.2 Difficulty in walking  Z47.89 Encounter for orthopedic aftercare   Date of Evaluation 24    Additional Pertinent History CHF, COPD, DM II, Diabetic Neuropathy, HTN, Thyroid disease, Bipolar 2, Right BKA (2019), Right AKA (16), MRSA, Cholecystectomy, right foot debridement, Left Gastroc recession      Functional Outcome Measure Used AmpnoPro   Functional Outcome Score /47 K1 (24)       Insurance: Primary: Payor: [facility] MEDICARE /  /  / ,   Secondary:    Authorization Information: OUTPATIENT BENEFITS:               DEDUCTIBLE: $240 MET                  OUT OF POCKET: $8850 MET $301.37              INSURANCE PAYS AT:  80% after deductible              PATIENT RESPONSIBILITY AND/OR CO-PAY:  20% after deductible  SECONDARY INSURANCE COMPANY:  MEDICAID       PRE CERTIFICATION REQUIRED: No precert required.  INSURANCE THERAPY BENEFIT: NO LIMIT BASED ON MED NECESSITY. .   AQUATIC THERAPY COVERED: Yes  MODALITIES COVERED:  Yes   Approved Procedure Codes: Authorization of Specific CPT Codes Not Required  (Codes requested indicated by red font, codes approved indicated by black font)   Visit # 2, 2/10 for progress note (Reporting Period: 24 to  **   )   Visits Allowed: BMN   Recertification Date: 24   Physician Follow-Up: 4/3/24   Physician Orders:

## 2024-03-15 NOTE — DISCHARGE SUMMARY
Physician Discharge Summary     Patient ID:  Milly Sexton  [id number]  15 y.o.  1970    Admit date: 8/24/2017    Discharge date and time: 9/1/2017  9:30 AM     Admitting Physician: Prema Solomon MD     Discharge Physician: Willy Pritchett      Admission Diagnoses: Major depressive disorder, recurrent ([facility] [F33.9]    Discharge Diagnoses:   <principal problem not specified>     Past Medical History:   Diagnosis Date    Bipolar 1 disorder ([facility]     follows with therapist Acsota Morales    Diabetes [facility])     dx  age 20- follows with Dr Daren Ramirez Diabetes mellitus ([facility]     Diabetic foot ulcer ([facility]     Diabetic ulcer of right foot associated with type 2 diabetes mellitus ([facility] 12/10/2015    Heart murmur     denies any chest pain or palpitations    Hypertension     \"never been on b/p medication that I know of\"    WD-Skin ulcer of fourth toe of right foot with necrosis of bone ([facility] 6/29/2016      Admission Condition: poor    Discharged Condition: stable    Indication for Admission: threat to self    HPI:  Per Consult Note      Per ED note the patient is a 50 y.o. male  who presents with ongoing left shoulder pain and drainage from prior surgical wound of left shoulder.  Patient was initially seen by us in June 2017 for infected left shoulder.  He was then brought back to the OR on July 20 2017 for closure of this wound. [facility] had been doing well however recently noticed pus from the incision.  Denies any fever or chills.  No numbness or tingling.  He has been in the psych unit here at [facility] secondary to suicidal tendencies. Grace Vasquez was at [facility] where they evaluated his left shoulder.  Determined the patient would need transferred to [facility] for definitive care by Dr Cordelia Villela:   Patient is a 50 y.o.single  male with significant past psych history of Bipolar Disorder who was seen today for if in [facility] or Stan Dye in [facility]     Time Spent: 20 minutes    Engagement: Patient displayed a good level of engagement with the treatments offered during this admission.      Signed:Electronically signed by Timur Peraza MD on 9/1/2017 at 9:30 AM                                                                                                                                      Admission

## 2024-03-19 ENCOUNTER — APPOINTMENT (OUTPATIENT)
Dept: PHYSICAL THERAPY | Age: 54
End: 2024-03-19
Payer: MEDICARE

## 2024-03-22 ENCOUNTER — TELEPHONE (OUTPATIENT)
Dept: FAMILY MEDICINE CLINIC | Age: 54
End: 2024-03-22

## 2024-03-22 NOTE — TELEPHONE ENCOUNTER
Danelle called and stated that after pt was evaluated by PT/OT  and they think pt needs a power chair would better fit his needs.   Unsure if the note just needs addend or if you would like the pt to come in for another appointment.     If note addend, would like the note the faxed to  [phone]

## 2024-03-25 ENCOUNTER — HOSPITAL ENCOUNTER (OUTPATIENT)
Dept: PHYSICAL THERAPY | Age: 54
Setting detail: THERAPIES SERIES
Discharge: HOME | End: 2024-03-25
Payer: MEDICARE

## 2024-03-25 PROCEDURE — 97110 THERAPEUTIC EXERCISES: CPT

## 2024-03-25 NOTE — PROGRESS NOTES
[facility]  PHYSICAL THERAPY  [] EVALUATION  [x] DAILY NOTE (LAND) [] DAILY NOTE (AQUATIC ) [] PROGRESS NOTE [] DISCHARGE NOTE    [x] OUTPATIENT REHABILITATION CENTER [facility]   [] [place] [facility]    [] [facility]   [] [facility]    Date: 3/25/2024  Patient Name:  Jamie Spangler  : 1970  MRN: [id number]  CSN: [id number]    Referring Practitioner Wood Miranda DO    Diagnosis Complete traumatic amputation at level between right hip and knee, initial encounter  Presence of artificial limb (complete) (partial), unspecified    Treatment Diagnosis M62.81 Generalized Muscle Weakness  R26.2 Difficulty in walking  Z47.89 Encounter for orthopedic aftercare   Date of Evaluation 24    Additional Pertinent History CHF, COPD, DM II, Diabetic Neuropathy, HTN, Thyroid disease, Bipolar 2, Right BKA (2019), Right AKA (16), MRSA, Cholecystectomy, right foot debridement, Left Gastroc recession      Functional Outcome Measure Used AmpnoPro   Functional Outcome Score /47 K1 (24)       Insurance: Primary: Payor: [facility] MEDICARE /  /  / ,   Secondary:    Authorization Information: OUTPATIENT BENEFITS:               DEDUCTIBLE: $240 MET                  OUT OF POCKET: $8850 MET $301.37              INSURANCE PAYS AT:  80% after deductible              PATIENT RESPONSIBILITY AND/OR CO-PAY:  20% after deductible  SECONDARY INSURANCE COMPANY:  MEDICAID       PRE CERTIFICATION REQUIRED: No precert required.  INSURANCE THERAPY BENEFIT: NO LIMIT BASED ON MED NECESSITY. .   AQUATIC THERAPY COVERED: Yes  MODALITIES COVERED:  Yes   Approved Procedure Codes: Authorization of Specific CPT Codes Not Required  (Codes requested indicated by red font, codes approved indicated by black font)   Visit # 3, 3/10 for progress note (Reporting Period: 24 to  **   )   Visits Allowed: BMN   Recertification Date: 24   Physician Follow-Up: 4/3/24   Physician Orders:

## 2024-03-26 ENCOUNTER — APPOINTMENT (OUTPATIENT)
Dept: PHYSICAL THERAPY | Age: 54
End: 2024-03-26
Payer: MEDICARE

## 2024-04-01 ENCOUNTER — HOSPITAL ENCOUNTER (OUTPATIENT)
Dept: PHYSICAL THERAPY | Age: 54
Setting detail: THERAPIES SERIES
Discharge: HOME | End: 2024-04-01
Payer: MEDICARE

## 2024-04-01 PROCEDURE — 97110 THERAPEUTIC EXERCISES: CPT

## 2024-04-01 NOTE — PROGRESS NOTES
[facility]  PHYSICAL THERAPY  [] EVALUATION  [x] DAILY NOTE (LAND) [] DAILY NOTE (AQUATIC ) [] PROGRESS NOTE [] DISCHARGE NOTE    [x] OUTPATIENT REHABILITATION CENTER [facility]   [] [place] [facility]    [] [facility]   [] [facility]    Date: 2024  Patient Name:  Jamie Spangler  : 1970  MRN: [id number]  CSN: [id number]    Referring Practitioner Wood Miranda DO    Diagnosis Complete traumatic amputation at level between right hip and knee, initial encounter  Presence of artificial limb (complete) (partial), unspecified    Treatment Diagnosis M62.81 Generalized Muscle Weakness  R26.2 Difficulty in walking  Z47.89 Encounter for orthopedic aftercare   Date of Evaluation 24    Additional Pertinent History CHF, COPD, DM II, Diabetic Neuropathy, HTN, Thyroid disease, Bipolar 2, Right BKA (2019), Right AKA (16), MRSA, Cholecystectomy, right foot debridement, Left Gastroc recession      Functional Outcome Measure Used AmpnoPro   Functional Outcome Score 11/47 K1 (24)       Insurance: Primary: Payor: [facility] MEDICARE /  /  / ,   Secondary:    Authorization Information: OUTPATIENT BENEFITS:               DEDUCTIBLE: $240 MET                  OUT OF POCKET: $8850 MET $301.37              INSURANCE PAYS AT:  80% after deductible              PATIENT RESPONSIBILITY AND/OR CO-PAY:  20% after deductible  SECONDARY INSURANCE COMPANY:  MEDICAID       PRE CERTIFICATION REQUIRED: No precert required.  INSURANCE THERAPY BENEFIT: NO LIMIT BASED ON MED NECESSITY. .   AQUATIC THERAPY COVERED: Yes  MODALITIES COVERED:  Yes   Approved Procedure Codes: Authorization of Specific CPT Codes Not Required  (Codes requested indicated by red font, codes approved indicated by black font)   Visit # 4, 4/10 for progress note (Reporting Period: 24 to  **   )   Visits Allowed: BMN   Recertification Date: 24   Physician Follow-Up: 4/3/24   Physician Orders:

## 2024-04-02 SDOH — ECONOMIC STABILITY: FOOD INSECURITY: WITHIN THE PAST 12 MONTHS, YOU WORRIED THAT YOUR FOOD WOULD RUN OUT BEFORE YOU GOT MONEY TO BUY MORE.: SOMETIMES TRUE

## 2024-04-02 SDOH — ECONOMIC STABILITY: FOOD INSECURITY: WITHIN THE PAST 12 MONTHS, THE FOOD YOU BOUGHT JUST DIDN'T LAST AND YOU DIDN'T HAVE MONEY TO GET MORE.: SOMETIMES TRUE

## 2024-04-02 SDOH — HEALTH STABILITY: PHYSICAL HEALTH: ON AVERAGE, HOW MANY DAYS PER WEEK DO YOU ENGAGE IN MODERATE TO STRENUOUS EXERCISE (LIKE A BRISK WALK)?: 1 DAY

## 2024-04-02 SDOH — ECONOMIC STABILITY: INCOME INSECURITY: HOW HARD IS IT FOR YOU TO PAY FOR THE VERY BASICS LIKE FOOD, HOUSING, MEDICAL CARE, AND HEATING?: SOMEWHAT HARD

## 2024-04-02 ASSESSMENT — PATIENT HEALTH QUESTIONNAIRE - PHQ9
SUM OF ALL RESPONSES TO PHQ QUESTIONS 1-9: 2
SUM OF ALL RESPONSES TO PHQ QUESTIONS 1-9: 2
SUM OF ALL RESPONSES TO PHQ9 QUESTIONS 1 & 2: 2
SUM OF ALL RESPONSES TO PHQ QUESTIONS 1-9: 2
SUM OF ALL RESPONSES TO PHQ QUESTIONS 1-9: 2
2. FEELING DOWN, DEPRESSED OR HOPELESS: SEVERAL DAYS
1. LITTLE INTEREST OR PLEASURE IN DOING THINGS: SEVERAL DAYS

## 2024-04-02 ASSESSMENT — LIFESTYLE VARIABLES
HOW OFTEN DO YOU HAVE A DRINK CONTAINING ALCOHOL: 1
HOW OFTEN DO YOU HAVE SIX OR MORE DRINKS ON ONE OCCASION: 1
HOW MANY STANDARD DRINKS CONTAINING ALCOHOL DO YOU HAVE ON A TYPICAL DAY: 0
HOW OFTEN DO YOU HAVE A DRINK CONTAINING ALCOHOL: NEVER
HOW MANY STANDARD DRINKS CONTAINING ALCOHOL DO YOU HAVE ON A TYPICAL DAY: PATIENT DOES NOT DRINK

## 2024-04-03 ENCOUNTER — OFFICE VISIT (OUTPATIENT)
Dept: FAMILY MEDICINE CLINIC | Age: 54
End: 2024-04-03
Payer: MEDICARE

## 2024-04-03 VITALS
SYSTOLIC BLOOD PRESSURE: 142 MMHG | DIASTOLIC BLOOD PRESSURE: 78 MMHG | OXYGEN SATURATION: 98 % | TEMPERATURE: 98.2 F | RESPIRATION RATE: 18 BRPM | HEART RATE: 108 BPM

## 2024-04-03 DIAGNOSIS — I73.9 PERIPHERAL VASCULAR DISEASE, UNSPECIFIED (HCC): ICD-10-CM

## 2024-04-03 DIAGNOSIS — S78.111A ABOVE KNEE AMPUTATION OF RIGHT LOWER EXTREMITY (HCC): ICD-10-CM

## 2024-04-03 DIAGNOSIS — K21.9 GASTROESOPHAGEAL REFLUX DISEASE WITHOUT ESOPHAGITIS: ICD-10-CM

## 2024-04-03 DIAGNOSIS — Z79.899 CONTROLLED SUBSTANCE AGREEMENT SIGNED: ICD-10-CM

## 2024-04-03 DIAGNOSIS — E03.9 ADULT HYPOTHYROIDISM: ICD-10-CM

## 2024-04-03 DIAGNOSIS — Z79.4 TYPE 2 DIABETES MELLITUS WITH OTHER CIRCULATORY COMPLICATION, WITH LONG-TERM CURRENT USE OF INSULIN (HCC): ICD-10-CM

## 2024-04-03 DIAGNOSIS — E78.2 MIXED HYPERLIPIDEMIA: ICD-10-CM

## 2024-04-03 DIAGNOSIS — I10 ESSENTIAL HYPERTENSION: ICD-10-CM

## 2024-04-03 DIAGNOSIS — I50.32 CHRONIC DIASTOLIC CONGESTIVE HEART FAILURE (HCC): ICD-10-CM

## 2024-04-03 DIAGNOSIS — E11.59 TYPE 2 DIABETES MELLITUS WITH OTHER CIRCULATORY COMPLICATION, WITH LONG-TERM CURRENT USE OF INSULIN (HCC): ICD-10-CM

## 2024-04-03 DIAGNOSIS — G62.9 NEUROPATHY: ICD-10-CM

## 2024-04-03 DIAGNOSIS — Z72.0 TOBACCO ABUSE: ICD-10-CM

## 2024-04-03 DIAGNOSIS — G54.6 PHANTOM LIMB SYNDROME WITH PAIN (HCC): ICD-10-CM

## 2024-04-03 DIAGNOSIS — Z00.00 MEDICARE ANNUAL WELLNESS VISIT, SUBSEQUENT: Primary | ICD-10-CM

## 2024-04-03 DIAGNOSIS — I25.119 ATHEROSCLEROSIS OF NATIVE CORONARY ARTERY OF NATIVE HEART WITH ANGINA PECTORIS (HCC): ICD-10-CM

## 2024-04-03 DIAGNOSIS — Z00.01 ENCOUNTER FOR GENERAL ADULT MEDICAL EXAMINATION WITH ABNORMAL FINDINGS: ICD-10-CM

## 2024-04-03 PROBLEM — E11.9 TYPE 2 DIABETES MELLITUS (HCC): Status: ACTIVE | Noted: 2024-04-03

## 2024-04-03 LAB
AMPHETAMINES UR QL SCN: NEGATIVE
BARBITURATES UR QL SCN: NEGATIVE
BENZODIAZ UR QL SCN: NEGATIVE
BZE UR QL SCN: NEGATIVE
CANNABINOIDS UR QL SCN: NEGATIVE
FENTANYL: NEGATIVE
MICROALB/CREAT RATIO POC: < 30 MG/G
MICROALBUMIN/CREAT UR-RTO: 30 MG/L
OPIATES UR QL SCN: NEGATIVE
OXYCODONE: NEGATIVE
PCP UR QL SCN: NEGATIVE
POC CREATININE, URINE: 200 MG/DL

## 2024-04-03 PROCEDURE — 99214 OFFICE O/P EST MOD 30 MIN: CPT | Performed by: NURSE PRACTITIONER

## 2024-04-03 PROCEDURE — G8417 CALC BMI ABV UP PARAM F/U: HCPCS | Performed by: NURSE PRACTITIONER

## 2024-04-03 PROCEDURE — G0402 INITIAL PREVENTIVE EXAM: HCPCS | Performed by: NURSE PRACTITIONER

## 2024-04-03 PROCEDURE — 1036F TOBACCO NON-USER: CPT | Performed by: NURSE PRACTITIONER

## 2024-04-03 PROCEDURE — G8427 DOCREV CUR MEDS BY ELIG CLIN: HCPCS | Performed by: NURSE PRACTITIONER

## 2024-04-03 PROCEDURE — 2022F DILAT RTA XM EVC RTNOPTHY: CPT | Performed by: NURSE PRACTITIONER

## 2024-04-03 PROCEDURE — 3044F HG A1C LEVEL LT 7.0%: CPT | Performed by: NURSE PRACTITIONER

## 2024-04-03 PROCEDURE — 82044 UR ALBUMIN SEMIQUANTITATIVE: CPT | Performed by: NURSE PRACTITIONER

## 2024-04-03 PROCEDURE — 3078F DIAST BP <80 MM HG: CPT | Performed by: NURSE PRACTITIONER

## 2024-04-03 PROCEDURE — 3077F SYST BP >= 140 MM HG: CPT | Performed by: NURSE PRACTITIONER

## 2024-04-03 PROCEDURE — 3017F COLORECTAL CA SCREEN DOC REV: CPT | Performed by: NURSE PRACTITIONER

## 2024-04-03 RX ORDER — ATORVASTATIN CALCIUM 80 MG/1
80 TABLET, FILM COATED ORAL NIGHTLY
Qty: 90 TABLET | Refills: 1 | Status: SHIPPED | OUTPATIENT
Start: 2024-04-03

## 2024-04-03 RX ORDER — PREGABALIN 50 MG/1
50 CAPSULE ORAL 3 TIMES DAILY
Qty: 90 CAPSULE | Refills: 3 | Status: SHIPPED | OUTPATIENT
Start: 2024-04-03 | End: 2024-05-03

## 2024-04-03 RX ORDER — OMEPRAZOLE 40 MG/1
40 CAPSULE, DELAYED RELEASE ORAL
Qty: 90 CAPSULE | Refills: 1 | Status: SHIPPED | OUTPATIENT
Start: 2024-04-03

## 2024-04-03 ASSESSMENT — PATIENT HEALTH QUESTIONNAIRE - PHQ9
7. TROUBLE CONCENTRATING ON THINGS, SUCH AS READING THE NEWSPAPER OR WATCHING TELEVISION: NOT AT ALL
9. THOUGHTS THAT YOU WOULD BE BETTER OFF DEAD, OR OF HURTING YOURSELF: NOT AT ALL
6. FEELING BAD ABOUT YOURSELF - OR THAT YOU ARE A FAILURE OR HAVE LET YOURSELF OR YOUR FAMILY DOWN: NOT AT ALL
SUM OF ALL RESPONSES TO PHQ QUESTIONS 1-9: 6
4. FEELING TIRED OR HAVING LITTLE ENERGY: MORE THAN HALF THE DAYS
10. IF YOU CHECKED OFF ANY PROBLEMS, HOW DIFFICULT HAVE THESE PROBLEMS MADE IT FOR YOU TO DO YOUR WORK, TAKE CARE OF THINGS AT HOME, OR GET ALONG WITH OTHER PEOPLE: NOT DIFFICULT AT ALL
SUM OF ALL RESPONSES TO PHQ QUESTIONS 1-9: 6
5. POOR APPETITE OR OVEREATING: NOT AT ALL
3. TROUBLE FALLING OR STAYING ASLEEP: MORE THAN HALF THE DAYS
2. FEELING DOWN, DEPRESSED OR HOPELESS: SEVERAL DAYS
1. LITTLE INTEREST OR PLEASURE IN DOING THINGS: SEVERAL DAYS
SUM OF ALL RESPONSES TO PHQ9 QUESTIONS 1 & 2: 2
8. MOVING OR SPEAKING SO SLOWLY THAT OTHER PEOPLE COULD HAVE NOTICED. OR THE OPPOSITE, BEING SO FIGETY OR RESTLESS THAT YOU HAVE BEEN MOVING AROUND A LOT MORE THAN USUAL: NOT AT ALL

## 2024-04-03 NOTE — PROGRESS NOTES
Medicare Annual Wellness Visit    Jamie Spangler is here for Medicare AWV (No concerns ) and Health Maintenance    Assessment & Plan   Medicare annual wellness visit, subsequent  Above knee amputation of right lower extremity (HCC)  Peripheral vascular disease, unspecified (HCC)  Chronic diastolic congestive heart failure (HCC)  Managed by cardiology   Assessment & Plan:   Monitored by specialist- no acute findings meriting change in the plan  Atherosclerosis of native coronary artery of native heart with angina pectoris (HCC)  Type 2 diabetes mellitus with other circulatory complication, with long-term current use of insulin (HCC)  -improved continue with IM and current meds  -     POCT Urinalysis No Micro (Auto)  Adult hypothyroidism  controlled  Neuropathy  Not controlled stop gabapentin and change over to lyrica  Phantom limb syndrome with pain (HCC)  -     pregabalin (LYRICA) 50 MG capsule; Take 1 capsule by mouth 3 times daily for 30 days. Max Daily Amount: 150 mg, Disp-90 capsule, R-3Normal  -     Urine Drug Screen, Comprehensive; Future  Essential hypertension  controlled  -     metoprolol tartrate (LOPRESSOR) 25 MG tablet; Take 1 tablet by mouth 2 times daily Re start tomorrow evening 2/3, Disp-180 tablet, R-4Normal  -     atorvastatin (LIPITOR) 80 MG tablet; Take 1 tablet by mouth at bedtime, Disp-90 tablet, R-1Normal  Gastroesophageal reflux disease without esophagitis  GERD diet   -     omeprazole (PRILOSEC) 40 MG delayed release capsule; Take 1 capsule by mouth every morning (before breakfast), Disp-90 capsule, R-1Normal  Mixed hyperlipidemia  Low fat low cholesterol diet   -     atorvastatin (LIPITOR) 80 MG tablet; Take 1 tablet by mouth at bedtime, Disp-90 tablet, R-1Normal  Encounter for general adult medical examination with abnormal findings  Controlled substance agreement signed  -     Urine Drug Screen, Comprehensive; Future    Controlled substances monitoring: possible medication side effects,

## 2024-04-03 NOTE — PATIENT INSTRUCTIONS
[place] [facility]*  (Call 211 if need more resources.)    [facility]:    What they offer: Helps provide affordable, decent, safe housing.  Help further your education in buying a home and securing your financial future with Family Self-Sufficiency (FSS).  They have a home ownership program that helps Housing Choice voucher holders.  They have the Single Room Occupancy (SRO) which helps single homeless males apply to live in the [facility].  Shelter Plus care vouchers for individuals and small households in [facility], who have at least 1 disable person and are homeless. Project based vouchers for subsidized units by the [place] housing authority’s section 8 program.   Phone Number: 499.842.8775  Website: www.[facility]:  What they offer: Help for Domestic violence victims. Obtain emergency confidential shelter, individual and housing advocacy.    Phone Number: 676.577.5493  Website: https://[employer]    [facility]:  What they offer: Provide prevention services before families reach crisis, Homeless Shelter for families, stabilizing programs once they have secured housing to insure, they remain independent.   Phone Number:  711.558.8863  Website: https://familypromise.org/latest/affiliate/[facility]:  What they offer: Help for women in crisis. Provide the basic needs - shelter, clothing, food - as well as access to medical care, education, and transportation to necessary appointments and job interviews.  Phone Number’s: 238.924.8761 or [phone]  Website: https://[facility].org      [facility]:  What they offer: Overnight shelter for Males 21 years + or at-risk youth on a temporary basis. Day shelter for inclement weather. Meals served daily, clothing distribution, personal care items. Showers & laundry. Transitional/low-income

## 2024-04-09 ENCOUNTER — OFFICE VISIT (OUTPATIENT)
Dept: INTERNAL MEDICINE CLINIC | Age: 54
End: 2024-04-09
Payer: MEDICARE

## 2024-04-09 VITALS — SYSTOLIC BLOOD PRESSURE: 144 MMHG | DIASTOLIC BLOOD PRESSURE: 71 MMHG | TEMPERATURE: 99.6 F | HEART RATE: 107 BPM

## 2024-04-09 DIAGNOSIS — E11.65 TYPE 2 DIABETES MELLITUS WITH HYPERGLYCEMIA, WITH LONG-TERM CURRENT USE OF INSULIN (HCC): Primary | ICD-10-CM

## 2024-04-09 DIAGNOSIS — Z79.4 TYPE 2 DIABETES MELLITUS WITH HYPERGLYCEMIA, WITH LONG-TERM CURRENT USE OF INSULIN (HCC): Primary | ICD-10-CM

## 2024-04-09 PROCEDURE — NBSRV NON-BILLABLE SERVICE: Performed by: PHARMACIST

## 2024-04-09 PROCEDURE — G0108 DIAB MANAGE TRN  PER INDIV: HCPCS | Performed by: PHARMACIST

## 2024-04-09 PROCEDURE — 83036 HEMOGLOBIN GLYCOSYLATED A1C: CPT | Performed by: PHARMACIST

## 2024-04-09 NOTE — PROGRESS NOTES
[facility]  [address] [place] [address]  [phone] (phone)  [phone] (fax)    Patient ID: Jamie Spangler 1970  Referring Provider: Dr. Mitchell     Patient's name and  were verified.    Subjective:    He presents for a follow-up diabetic visit. He has type 2 diabetes mellitus. He is compliant some of the time.  Assessment:     Lab Results   Component Value Date/Time    LABA1C 6.1 2024 01:17 PM    BUN 17 2024 01:17 PM    CREATININE 1.0 2024 01:17 PM     Vitals:    24 1515   BP: (!) 144/71   Pulse: (!) 107   Temp: 99.6 °F (37.6 °C)     Wt Readings from Last 3 Encounters:   24 102.1 kg (225 lb)   23 102.1 kg (225 lb)   10/30/23 102.1 kg (225 lb)     Ht Readings from Last 3 Encounters:   24 1.676 m (5' 6\")   24 1.676 m (5' 6\")   23 1.676 m (5' 6\")     Est, Glom Filt Rate   Date Value Ref Range Status   2024 >60 >60 ml/min/1.73m2 Final     Comment:     Pediatric calculator link  https://www.kidney.org/professionals/kdoqi/gfr_calculatorped  Effective Oct 3, 2022  These results are not intended for use in patients  <18 years of age.  eGFR results are calculated without a race factor  using the  CKD-EPI equation.  Careful clinical  correlation is recommended, particularly when comparing  to results calculated using previous equations. The  CKD-EPI equation is less accurate in patients with  extremes of muscle mass, extra-renal metabolism of  creatinine, excessive creatine ingestion, or following  therapy that affects renal tubular secretion.  Performed at [facility] [address] [place] [id number]           Diabetes Pharmacotherapy:  Trulicity 4.5mg weekly  Jardiance 25mg daily  Toujeo 35 units BID  Take 30-35 units of Humalog with meals depending on glucose     Glucose Trends:   Glucose at 4 hrs PPD today resulted at 75 mg/dl - recheck showed glucose of 64 - patient given orange juice  Current monitoring regimen:

## 2024-04-09 NOTE — PATIENT INSTRUCTIONS
Take Toujeo 36 units twice a day  Take mealtime HUMALOG as follows:  Breakfast: 10 units  Lunch: 5 units  Dinner: 10 units     Plus sliding scale HUMALOG as follows:  Below 70, treat for hypoglycemia  , no additional insulin  201-250, 2 units  251-300, 4 units  301-350, 6 units  351-400, 8 units  Above 400, call MD

## 2024-04-17 ENCOUNTER — APPOINTMENT (OUTPATIENT)
Dept: PHYSICAL THERAPY | Age: 54
End: 2024-04-17
Payer: MEDICARE

## 2024-04-19 ENCOUNTER — APPOINTMENT (OUTPATIENT)
Dept: PHYSICAL THERAPY | Age: 54
End: 2024-04-19
Payer: MEDICARE

## 2024-04-26 ENCOUNTER — APPOINTMENT (OUTPATIENT)
Dept: PHYSICAL THERAPY | Age: 54
End: 2024-04-26
Payer: MEDICARE

## 2024-05-01 ENCOUNTER — TELEPHONE (OUTPATIENT)
Age: 54
End: 2024-05-01

## 2024-06-20 ENCOUNTER — OFFICE VISIT (OUTPATIENT)
Dept: INTERNAL MEDICINE CLINIC | Age: 54
End: 2024-06-20
Payer: MEDICARE

## 2024-06-20 VITALS — DIASTOLIC BLOOD PRESSURE: 72 MMHG | TEMPERATURE: 100.3 F | SYSTOLIC BLOOD PRESSURE: 138 MMHG | HEART RATE: 94 BPM

## 2024-06-20 DIAGNOSIS — E11.65 TYPE 2 DIABETES MELLITUS WITH HYPERGLYCEMIA, WITH LONG-TERM CURRENT USE OF INSULIN (HCC): Primary | ICD-10-CM

## 2024-06-20 DIAGNOSIS — Z79.4 TYPE 2 DIABETES MELLITUS WITH HYPERGLYCEMIA, WITH LONG-TERM CURRENT USE OF INSULIN (HCC): Primary | ICD-10-CM

## 2024-06-20 LAB — HBA1C MFR BLD: 7.5 % (ref 4.3–5.7)

## 2024-06-20 PROCEDURE — NBSRV NON-BILLABLE SERVICE: Performed by: PHARMACIST

## 2024-06-20 PROCEDURE — G0108 DIAB MANAGE TRN  PER INDIV: HCPCS | Performed by: PHARMACIST

## 2024-06-20 NOTE — PROGRESS NOTES
varies - two hot pockets, zero sugar pop                       D: Ramen noodles, Tuna                        Snacks: killer brownie, glass of lactose free milk                       Beverages: zero sugar pop, zero sugar Monster, water  Current exercise: no regular exercise    Health Maintenance:  Diabetes Management   Topic Date Due    Diabetic retinal exam  06/02/2023    Diabetic foot exam  05/26/2024       Eye exam current (within one year): no Date: 6/2022, Dr Espinal  Any history of foot problems? yes - right AKA  Foot exam current: no, deferred Date: 5/2023, Dr. Mitchell  Immunizations up to date:    Pneumonia (dose of Prevnar 20 OR Pneumovax 23 + Prevnar 13): no    The ASCVD Risk score (Humaira ROE, et al., 2019) failed to calculate for the following reasons:    The patient has a prior MI or stroke diagnosis   Last panel - LDL:   Lab Results   Component Value Date    LDLDIRECT 102.12 02/26/2021     Taking ASA:  No   Taking statin: Yes - atorvastatin  Last microalbumin/creatinine ratio:   Microalb/Creat Ratio POC   Date Value Ref Range Status   04/03/2024 < 30 <30 mg/g Final     Comment:     Performed at [facility] Office under CLIA #  [id number]      Taking ACE/ARB: Yes- lisinopril  Depression: At risk (4/3/2024)    PHQ-2     PHQ-2 Score: 6        Focus:   Diabetes Education. Last A1C: 7.5%, 6/20/2024.    A1c not at goal, Dexcom GMI 8.8%, reviewed with patient.  Patient has been noncompliant with insulin regimen and has made adjustments on his own.  He is unwilling to use sliding scale, refuses to do this.  He has been using Toujeo 25 units BID (less than instructed), and Humalog 30 units TID AC (higher scheduled dose than instructed, and no sliding scale).  Discussed with patient, he states he is agreeable to insulin changes suggested at today's visit.      He reports overtreating lows, and also reports ignoring lows if they occur at night. Reviewed proper hypoglycemia treatment.      He has not been going to the gym,

## 2024-06-29 DIAGNOSIS — Z79.4 TYPE 2 DIABETES MELLITUS WITH HYPERGLYCEMIA, WITH LONG-TERM CURRENT USE OF INSULIN (HCC): ICD-10-CM

## 2024-06-29 DIAGNOSIS — E11.65 TYPE 2 DIABETES MELLITUS WITH HYPERGLYCEMIA, WITH LONG-TERM CURRENT USE OF INSULIN (HCC): ICD-10-CM

## 2024-06-29 DIAGNOSIS — E66.09 CLASS 2 OBESITY DUE TO EXCESS CALORIES WITH BODY MASS INDEX (BMI) OF 36.0 TO 36.9 IN ADULT, UNSPECIFIED WHETHER SERIOUS COMORBIDITY PRESENT: ICD-10-CM

## 2024-07-01 RX ORDER — DULAGLUTIDE 4.5 MG/.5ML
INJECTION, SOLUTION SUBCUTANEOUS
Qty: 4 ADJUSTABLE DOSE PRE-FILLED PEN SYRINGE | Refills: 0 | Status: SHIPPED | OUTPATIENT
Start: 2024-07-01

## 2024-07-24 ENCOUNTER — OFFICE VISIT (OUTPATIENT)
Dept: FAMILY MEDICINE CLINIC | Age: 54
End: 2024-07-24
Payer: MEDICARE

## 2024-07-24 VITALS
HEIGHT: 66 IN | BODY MASS INDEX: 36.32 KG/M2 | HEART RATE: 91 BPM | RESPIRATION RATE: 16 BRPM | TEMPERATURE: 98.3 F | DIASTOLIC BLOOD PRESSURE: 82 MMHG | SYSTOLIC BLOOD PRESSURE: 136 MMHG | OXYGEN SATURATION: 98 %

## 2024-07-24 DIAGNOSIS — K21.9 GASTROESOPHAGEAL REFLUX DISEASE WITHOUT ESOPHAGITIS: ICD-10-CM

## 2024-07-24 DIAGNOSIS — R53.81 PHYSICAL DEBILITY: ICD-10-CM

## 2024-07-24 DIAGNOSIS — E66.9 OBESITY (BMI 30-39.9): ICD-10-CM

## 2024-07-24 DIAGNOSIS — F31.81: ICD-10-CM

## 2024-07-24 DIAGNOSIS — Z79.899 CONTROLLED SUBSTANCE AGREEMENT SIGNED: ICD-10-CM

## 2024-07-24 DIAGNOSIS — E03.9 HYPOTHYROIDISM, UNSPECIFIED TYPE: ICD-10-CM

## 2024-07-24 DIAGNOSIS — L74.9 SWEATING ABNORMALITY: ICD-10-CM

## 2024-07-24 DIAGNOSIS — G62.9 NEUROPATHY: Primary | ICD-10-CM

## 2024-07-24 DIAGNOSIS — I10 ESSENTIAL HYPERTENSION: ICD-10-CM

## 2024-07-24 DIAGNOSIS — E55.9 VITAMIN D DEFICIENCY: ICD-10-CM

## 2024-07-24 DIAGNOSIS — S78.111A ABOVE KNEE AMPUTATION OF RIGHT LOWER EXTREMITY (HCC): ICD-10-CM

## 2024-07-24 DIAGNOSIS — G54.6 PHANTOM LIMB SYNDROME WITH PAIN (HCC): ICD-10-CM

## 2024-07-24 DIAGNOSIS — F33.41 RECURRENT MAJOR DEPRESSIVE DISORDER, IN PARTIAL REMISSION (HCC): ICD-10-CM

## 2024-07-24 PROBLEM — F33.9 DEPRESSION, RECURRENT (HCC): Status: RESOLVED | Noted: 2019-10-16 | Resolved: 2024-07-24

## 2024-07-24 PROBLEM — R53.1 WEAKNESS: Status: RESOLVED | Noted: 2019-02-20 | Resolved: 2024-07-24

## 2024-07-24 PROBLEM — R60.0 PEDAL EDEMA: Status: RESOLVED | Noted: 2020-05-12 | Resolved: 2024-07-24

## 2024-07-24 PROCEDURE — 3075F SYST BP GE 130 - 139MM HG: CPT | Performed by: NURSE PRACTITIONER

## 2024-07-24 PROCEDURE — 1036F TOBACCO NON-USER: CPT | Performed by: NURSE PRACTITIONER

## 2024-07-24 PROCEDURE — G8427 DOCREV CUR MEDS BY ELIG CLIN: HCPCS | Performed by: NURSE PRACTITIONER

## 2024-07-24 PROCEDURE — 99214 OFFICE O/P EST MOD 30 MIN: CPT | Performed by: NURSE PRACTITIONER

## 2024-07-24 PROCEDURE — G8417 CALC BMI ABV UP PARAM F/U: HCPCS | Performed by: NURSE PRACTITIONER

## 2024-07-24 PROCEDURE — 3017F COLORECTAL CA SCREEN DOC REV: CPT | Performed by: NURSE PRACTITIONER

## 2024-07-24 PROCEDURE — 3079F DIAST BP 80-89 MM HG: CPT | Performed by: NURSE PRACTITIONER

## 2024-07-24 RX ORDER — PREGABALIN 50 MG/1
50 CAPSULE ORAL 3 TIMES DAILY
Qty: 90 CAPSULE | Refills: 3 | Status: SHIPPED | OUTPATIENT
Start: 2024-07-24 | End: 2024-11-21

## 2024-07-24 ASSESSMENT — ENCOUNTER SYMPTOMS
RESPIRATORY NEGATIVE: 1
GASTROINTESTINAL NEGATIVE: 1

## 2024-07-24 NOTE — PROGRESS NOTES
Component Value Date    WBC 9.5 10/12/2023    HGB 16.8 10/12/2023    HCT 50.3 10/12/2023    MCV 95.3 (H) 10/12/2023     10/12/2023      Lab Results   Component Value Date     01/08/2024    K 3.9 01/08/2024     01/08/2024    CO2 25 01/08/2024    BUN 17 01/08/2024    CREATININE 1.0 01/08/2024    GLUCOSE 76 01/08/2024    CALCIUM 9.5 01/08/2024    BILITOT 0.4 01/08/2024    ALKPHOS 74 01/08/2024    AST 22 01/08/2024    ALT 15 01/08/2024    LABGLOM >60 01/08/2024    GFRAA >60 08/24/2016        Lab Results   Component Value Date    CHOL 161 04/18/2023    TRIG 123 04/18/2023    HDL 34 01/08/2024    LDL 87 01/08/2024      Lab Results   Component Value Date    TSH 2.650 04/18/2023    TSHHS 3.090 08/24/2016      Lab Results   Component Value Date    PSA 0.93 01/08/2024    PSA 0.78 03/24/2020      I have reviewed the patient's past medical history, past surgical history, allergies, medications, social and family history and I have made updates where appropriate.  Subjective:      Review of Systems   Constitutional:  Negative for chills, fatigue and fever.   HENT: Negative.     Respiratory: Negative.     Cardiovascular: Negative.    Gastrointestinal: Negative.    Genitourinary: Negative.    Musculoskeletal: Negative.    Skin: Negative.    Neurological:  Positive for numbness (LLE neuropathy).   Psychiatric/Behavioral:  Negative for self-injury, sleep disturbance and suicidal ideas. The patient is not nervous/anxious.        Objective:     /82   Pulse 91   Temp 98.3 °F (36.8 °C)   Resp 16   Ht 1.676 m (5' 6\")   SpO2 98%   BMI 36.32 kg/m²     Physical Exam  Vitals and nursing note reviewed.   Constitutional:       Appearance: He is not ill-appearing.   HENT:      Nose: Nose normal.      Mouth/Throat:      Mouth: Mucous membranes are moist.   Cardiovascular:      Rate and Rhythm: Normal rate and regular rhythm.      Pulses: Normal pulses.      Heart sounds: Normal heart sounds. No murmur

## 2024-07-28 ENCOUNTER — APPOINTMENT (OUTPATIENT)
Dept: GENERAL RADIOLOGY | Age: 54
End: 2024-07-28
Payer: MEDICARE

## 2024-07-28 ENCOUNTER — HOSPITAL ENCOUNTER (EMERGENCY)
Age: 54
Discharge: HOME | End: 2024-07-28
Payer: MEDICARE

## 2024-07-28 VITALS
OXYGEN SATURATION: 95 % | HEART RATE: 95 BPM | DIASTOLIC BLOOD PRESSURE: 78 MMHG | TEMPERATURE: 99.3 F | HEIGHT: 66 IN | SYSTOLIC BLOOD PRESSURE: 132 MMHG | RESPIRATION RATE: 16 BRPM | WEIGHT: 210 LBS | BODY MASS INDEX: 33.75 KG/M2

## 2024-07-28 DIAGNOSIS — L03.90 CELLULITIS, UNSPECIFIED CELLULITIS SITE: Primary | ICD-10-CM

## 2024-07-28 LAB
ANION GAP SERPL CALC-SCNC: 13 MEQ/L (ref 8–16)
BASOPHILS ABSOLUTE: 0.1 THOU/MM3 (ref 0–0.1)
BASOPHILS NFR BLD AUTO: 0.4 %
BUN SERPL-MCNC: 13 MG/DL (ref 7–22)
CALCIUM SERPL-MCNC: 9.3 MG/DL (ref 8.5–10.5)
CHLORIDE SERPL-SCNC: 101 MEQ/L (ref 98–111)
CO2 SERPL-SCNC: 22 MEQ/L (ref 23–33)
CREAT SERPL-MCNC: 0.7 MG/DL (ref 0.4–1.2)
CRP SERPL-MCNC: 1.82 MG/DL (ref 0–1)
DEPRECATED RDW RBC AUTO: 45.2 FL (ref 35–45)
EOSINOPHIL NFR BLD AUTO: 2.2 %
EOSINOPHILS ABSOLUTE: 0.3 THOU/MM3 (ref 0–0.4)
ERYTHROCYTE [DISTWIDTH] IN BLOOD BY AUTOMATED COUNT: 13.6 % (ref 11.5–14.5)
ERYTHROCYTE [SEDIMENTATION RATE] IN BLOOD BY WESTERGREN METHOD: 26 MM/HR (ref 0–10)
GFR SERPL CREATININE-BSD FRML MDRD: > 90 ML/MIN/1.73M2
GLUCOSE SERPL-MCNC: 154 MG/DL (ref 70–108)
HCT VFR BLD AUTO: 45.4 % (ref 42–52)
HGB BLD-MCNC: 15.9 GM/DL (ref 14–18)
IMM GRANULOCYTES # BLD AUTO: 0.08 THOU/MM3 (ref 0–0.07)
IMM GRANULOCYTES NFR BLD AUTO: 0.6 %
LYMPHOCYTES ABSOLUTE: 1.6 THOU/MM3 (ref 1–4.8)
LYMPHOCYTES NFR BLD AUTO: 11.5 %
MCH RBC QN AUTO: 32.1 PG (ref 26–33)
MCHC RBC AUTO-ENTMCNC: 35 GM/DL (ref 32.2–35.5)
MCV RBC AUTO: 91.5 FL (ref 80–94)
MONOCYTES ABSOLUTE: 1.2 THOU/MM3 (ref 0.4–1.3)
MONOCYTES NFR BLD AUTO: 8.3 %
NEUTROPHILS ABSOLUTE: 10.9 THOU/MM3 (ref 1.8–7.7)
NEUTROPHILS NFR BLD AUTO: 77 %
NRBC BLD AUTO-RTO: 0 /100 WBC
OSMOLALITY SERPL CALC.SUM OF ELEC: 275.2 MOSMOL/KG (ref 275–300)
PLATELET # BLD AUTO: 194 THOU/MM3 (ref 130–400)
PMV BLD AUTO: 9.6 FL (ref 9.4–12.4)
POTASSIUM SERPL-SCNC: 4.2 MEQ/L (ref 3.5–5.2)
PROCALCITONIN SERPL IA-MCNC: 0.09 NG/ML (ref 0.01–0.09)
RBC # BLD AUTO: 4.96 MILL/MM3 (ref 4.7–6.1)
SODIUM SERPL-SCNC: 136 MEQ/L (ref 135–145)
WBC # BLD AUTO: 14.2 THOU/MM3 (ref 4.8–10.8)

## 2024-07-28 PROCEDURE — 96365 THER/PROPH/DIAG IV INF INIT: CPT

## 2024-07-28 PROCEDURE — 73140 X-RAY EXAM OF FINGER(S): CPT

## 2024-07-28 PROCEDURE — 96375 TX/PRO/DX INJ NEW DRUG ADDON: CPT

## 2024-07-28 PROCEDURE — 99284 EMERGENCY DEPT VISIT MOD MDM: CPT

## 2024-07-28 PROCEDURE — 85025 COMPLETE CBC W/AUTO DIFF WBC: CPT

## 2024-07-28 PROCEDURE — 36415 COLL VENOUS BLD VENIPUNCTURE: CPT

## 2024-07-28 PROCEDURE — 2580000003 HC RX 258: Performed by: PHYSICIAN ASSISTANT

## 2024-07-28 PROCEDURE — 6360000002 HC RX W HCPCS: Performed by: PHYSICIAN ASSISTANT

## 2024-07-28 PROCEDURE — 87040 BLOOD CULTURE FOR BACTERIA: CPT

## 2024-07-28 PROCEDURE — 86140 C-REACTIVE PROTEIN: CPT

## 2024-07-28 PROCEDURE — 80048 BASIC METABOLIC PNL TOTAL CA: CPT

## 2024-07-28 PROCEDURE — 84145 PROCALCITONIN (PCT): CPT

## 2024-07-28 PROCEDURE — 85651 RBC SED RATE NONAUTOMATED: CPT

## 2024-07-28 RX ORDER — MORPHINE SULFATE 4 MG/ML
4 INJECTION, SOLUTION INTRAMUSCULAR; INTRAVENOUS ONCE
Status: COMPLETED | OUTPATIENT
Start: 2024-07-28 | End: 2024-07-28

## 2024-07-28 RX ORDER — ONDANSETRON 2 MG/ML
4 INJECTION INTRAMUSCULAR; INTRAVENOUS ONCE
Status: COMPLETED | OUTPATIENT
Start: 2024-07-28 | End: 2024-07-28

## 2024-07-28 RX ORDER — DOXYCYCLINE HYCLATE 100 MG
100 TABLET ORAL 2 TIMES DAILY
Qty: 20 TABLET | Refills: 0 | Status: SHIPPED | OUTPATIENT
Start: 2024-07-28 | End: 2024-08-07

## 2024-07-28 RX ADMIN — CEFTRIAXONE SODIUM 1000 MG: 1 INJECTION, POWDER, FOR SOLUTION INTRAMUSCULAR; INTRAVENOUS at 08:46

## 2024-07-28 RX ADMIN — MORPHINE SULFATE 4 MG: 4 INJECTION, SOLUTION INTRAMUSCULAR; INTRAVENOUS at 09:17

## 2024-07-28 RX ADMIN — ONDANSETRON 4 MG: 2 INJECTION INTRAMUSCULAR; INTRAVENOUS at 09:16

## 2024-07-28 ASSESSMENT — PAIN - FUNCTIONAL ASSESSMENT: PAIN_FUNCTIONAL_ASSESSMENT: 0-10

## 2024-07-28 ASSESSMENT — PAIN DESCRIPTION - LOCATION
LOCATION: FINGER (COMMENT WHICH ONE)
LOCATION: FINGER (COMMENT WHICH ONE)

## 2024-07-28 ASSESSMENT — PAIN DESCRIPTION - ORIENTATION
ORIENTATION: RIGHT
ORIENTATION: RIGHT

## 2024-07-28 ASSESSMENT — PAIN SCALES - GENERAL
PAINLEVEL_OUTOF10: 10
PAINLEVEL_OUTOF10: 10

## 2024-07-28 NOTE — ED NOTES
Pt presents to ED with c/o R middle finger pain and swelling. Pt states cyst removed in June, states swelling has been persistent since surgery, states increased pain yesterday and pus coming from site.

## 2024-07-28 NOTE — ED PROVIDER NOTES
[facility] OR    COLONOSCOPY      COLONOSCOPY N/A 8/18/2023    COLONOSCOPY performed by Miguel Hernandez MD at [facility] ENDOSCOPY    COLONOSCOPY N/A 10/13/2023    COLONOSCOPY performed by Miguel Hernandez MD at [facility] ENDOSCOPY    COLONOSCOPY N/A 10/30/2023    COLONOSCOPY WITH BIOPSY performed by Miguel Hernandez MD at [facility] ENDOSCOPY    CYSTOSCOPY N/A 7/20/2020    CYSTOSCOPY performed by Jason Paris Jr., MD at [facility] OR    CYSTOSCOPY N/A 8/21/2020    CYSTOSCOPY, UROLIFT performed by Jason Paris Jr., MD at [facility] OR    DILATATION, ESOPHAGUS      ENDOSCOPY, COLON, DIAGNOSTIC      FOOT DEBRIDEMENT Right 07/01/2016    I & D    GASTROCNEMIUS RECESSION Left 11/12/2021    LEFT LEG GASTROCS RECESSION performed by Osvaldo Dodge MD at [facility] OR    INCISION AND DRAINAGE Right 2/18/2019    I&D RIGHT STUMP performed by Rey Hanna MD at [facility] OR    LEG AMPUTATION BELOW KNEE Right 07/20/2016    LEG AMPUTATION BELOW KNEE Right 4/4/2019    I&D AND REVISION OF AMPUTATION RIGHT LEG performed by Rey Hanna MD at [facility] OR    OTHER SURGICAL HISTORY Right 1/14/15    sole of foot I&D    PA I&D SHOULDER INFECTED BURSA Left 8/18/2017    LEFT SHOULDER INCISION AND DRAINAGE performed by Rey Hanna MD at [facility] OR    PA OFFICE/OUTPT VISIT,PROCEDURE ONLY Right 9/20/2018    EXCISIONAL DEBRIDEMENT RIGHT BKA STUMP performed by Pete Gaston MD at [facility] OR    TOE AMPUTATION Right 1/16/15    2nd toe with wound vac applied    UPPER GASTROINTESTINAL ENDOSCOPY N/A 3/3/2020    EGD DILATION SAVORY performed by Anthony Moe MD at [facility] Endoscopy    UPPER GASTROINTESTINAL ENDOSCOPY N/A 6/15/2022    EGD DILATION BALLOON performed by Miguel Hernandez MD at [facility] Endoscopy    UPPER GASTROINTESTINAL ENDOSCOPY N/A 8/18/2023    EGD BIOPSY performed by Miguel Hernandez MD at [facility] ENDOSCOPY    WISDOM TOOTH EXTRACTION  ?when       CURRENT MEDICATIONS       Discharge Medication List as of 7/28/2024 10:06 AM        CONTINUE these medications which

## 2024-08-01 DIAGNOSIS — G54.6 PHANTOM LIMB SYNDROME WITH PAIN (HCC): ICD-10-CM

## 2024-08-01 RX ORDER — PREGABALIN 50 MG/1
CAPSULE ORAL
Qty: 90 CAPSULE | Refills: 3 | OUTPATIENT
Start: 2024-08-01

## 2024-08-01 NOTE — TELEPHONE ENCOUNTER
Recent Visits  Date Type Provider Dept   07/24/24 Office Visit Esteban Juan, APRN - CNP [facility]   04/03/24 Office Visit Esteban Juan, APRN - CNP [facility]   01/02/24 Office Visit Esteban Juan, APRN - CNP [facility]   09/26/23 Office Visit Esteban Juan, APRN - CNP [facility]   04/20/23 Office Visit Phuc Ortega MD [facility] [place].   Showing recent visits within past 540 days with a meds authorizing provider and meeting all other requirements  Future Appointments  Date Type Provider Dept   10/24/24 Appointment Esteban Juan, APRN - CNP [facility]   Showing future appointments within next 150 days with a meds authorizing provider and meeting all other requirements

## 2024-08-02 LAB
BACTERIA BLD AEROBE CULT: NORMAL
BACTERIA BLD AEROBE CULT: NORMAL

## 2024-08-14 DIAGNOSIS — E11.65 TYPE 2 DIABETES MELLITUS WITH HYPERGLYCEMIA, WITH LONG-TERM CURRENT USE OF INSULIN (HCC): ICD-10-CM

## 2024-08-14 DIAGNOSIS — Z79.4 TYPE 2 DIABETES MELLITUS WITH HYPERGLYCEMIA, WITH LONG-TERM CURRENT USE OF INSULIN (HCC): ICD-10-CM

## 2024-08-14 DIAGNOSIS — E66.09 CLASS 2 OBESITY DUE TO EXCESS CALORIES WITH BODY MASS INDEX (BMI) OF 36.0 TO 36.9 IN ADULT, UNSPECIFIED WHETHER SERIOUS COMORBIDITY PRESENT: ICD-10-CM

## 2024-08-15 RX ORDER — DULAGLUTIDE 4.5 MG/.5ML
INJECTION, SOLUTION SUBCUTANEOUS
Qty: 2 ML | Refills: 0 | Status: SHIPPED | OUTPATIENT
Start: 2024-08-15

## 2024-08-29 ENCOUNTER — TELEPHONE (OUTPATIENT)
Age: 54
End: 2024-08-29

## 2024-08-29 DIAGNOSIS — E11.65 TYPE 2 DIABETES MELLITUS WITH HYPERGLYCEMIA, WITH LONG-TERM CURRENT USE OF INSULIN (HCC): ICD-10-CM

## 2024-08-29 DIAGNOSIS — Z79.4 TYPE 2 DIABETES MELLITUS WITH HYPERGLYCEMIA, WITH LONG-TERM CURRENT USE OF INSULIN (HCC): ICD-10-CM

## 2024-08-29 DIAGNOSIS — E66.09 CLASS 2 OBESITY DUE TO EXCESS CALORIES WITH BODY MASS INDEX (BMI) OF 36.0 TO 36.9 IN ADULT, UNSPECIFIED WHETHER SERIOUS COMORBIDITY PRESENT: ICD-10-CM

## 2024-08-29 RX ORDER — DULAGLUTIDE 4.5 MG/.5ML
INJECTION, SOLUTION SUBCUTANEOUS
Qty: 6 ML | Refills: 0 | Status: SHIPPED | OUTPATIENT
Start: 2024-08-29

## 2024-08-29 NOTE — TELEPHONE ENCOUNTER
Patient called in and states he would like to try ozempic.   He is also asking for his trulicity if he could be prescribed a 90 day instead of a 30 day.

## 2024-09-05 ENCOUNTER — OFFICE VISIT (OUTPATIENT)
Dept: CARDIOLOGY CLINIC | Age: 54
End: 2024-09-05
Payer: MEDICARE

## 2024-09-05 VITALS
WEIGHT: 210 LBS | DIASTOLIC BLOOD PRESSURE: 80 MMHG | SYSTOLIC BLOOD PRESSURE: 143 MMHG | HEART RATE: 88 BPM | HEIGHT: 66 IN | BODY MASS INDEX: 33.75 KG/M2

## 2024-09-05 DIAGNOSIS — I10 ESSENTIAL HYPERTENSION: ICD-10-CM

## 2024-09-05 DIAGNOSIS — I50.32 CHRONIC DIASTOLIC CONGESTIVE HEART FAILURE (HCC): ICD-10-CM

## 2024-09-05 DIAGNOSIS — Z95.820 S/P ANGIOPLASTY WITH STENT: ICD-10-CM

## 2024-09-05 DIAGNOSIS — E78.00 PURE HYPERCHOLESTEROLEMIA: ICD-10-CM

## 2024-09-05 DIAGNOSIS — Z91.199 NONCOMPLIANCE: ICD-10-CM

## 2024-09-05 DIAGNOSIS — I25.10 CORONARY ARTERY DISEASE INVOLVING NATIVE CORONARY ARTERY OF NATIVE HEART WITHOUT ANGINA PECTORIS: Primary | ICD-10-CM

## 2024-09-05 DIAGNOSIS — I73.9 PERIPHERAL VASCULAR DISEASE, UNSPECIFIED (HCC): ICD-10-CM

## 2024-09-05 PROCEDURE — G8417 CALC BMI ABV UP PARAM F/U: HCPCS | Performed by: INTERNAL MEDICINE

## 2024-09-05 PROCEDURE — 1036F TOBACCO NON-USER: CPT | Performed by: INTERNAL MEDICINE

## 2024-09-05 PROCEDURE — G8427 DOCREV CUR MEDS BY ELIG CLIN: HCPCS | Performed by: INTERNAL MEDICINE

## 2024-09-05 PROCEDURE — 3077F SYST BP >= 140 MM HG: CPT | Performed by: INTERNAL MEDICINE

## 2024-09-05 PROCEDURE — 3017F COLORECTAL CA SCREEN DOC REV: CPT | Performed by: INTERNAL MEDICINE

## 2024-09-05 PROCEDURE — 3079F DIAST BP 80-89 MM HG: CPT | Performed by: INTERNAL MEDICINE

## 2024-09-05 PROCEDURE — 99214 OFFICE O/P EST MOD 30 MIN: CPT | Performed by: INTERNAL MEDICINE

## 2024-09-05 RX ORDER — ASPIRIN 81 MG/1
81 TABLET ORAL DAILY
Qty: 90 TABLET | Refills: 3 | Status: SHIPPED | OUTPATIENT
Start: 2024-09-05

## 2024-09-05 NOTE — PROGRESS NOTES
pectoris        2. Chronic diastolic congestive heart failure (HCC)        3. Essential hypertension        4. Pure hypercholesterolemia        5. Peripheral vascular disease, unspecified ([facility])        6. S/P angioplasty with stent of the RCA nov 2022        7. Noncompliance                  Assessment nov 2022    NSTEMI  S/p cath 11/25/22 - s/p OLEG RCA  Ef 55 per TTE 11/25/22  HTN  HLD  DM   Hx right AKA  +COVID       ASSESSMENT:  1.  Basically, this overall is a 52-year-old gentleman, presented with  chest pain basically and cough and with fever, noted to have elevated  troponin and COVID-19 infection, and has been having recurrent chest  pain even after admission.  2.  The patient should be treated as acute coronary syndrome,  non-ST-elevation myocardial infarction in view of the troponin elevation  and recurrent chest pain.  3.  COVID-19, upper respiratory tract infection with cough, productive.  4.  History of cardiac catheterization in 2018 at [facility], mid LAD  50%, RCA 30%.  5.  Diabetes.  6.  Hyperlipidemia.  7.  Above-knee amputation on the right.  8.  Hypothyroidism.          admission DX 2020    ASSESSMENT:  1.  Shortness of breath, basically unexplained the course of which is  not very clear, so probably the patient may benefit from pulmonary  function study on that line and once cardiac cause of short of breath  has been ruled out.  He has history of smoking, and he has truncal  obesity.  2.  Hypertension.  3.  Diabetes with complication, neuropathy and diabetic foot abscess,  above-the-knee amputation on the right, wheelchair-bound.  4.  Hypothyroidism.  5.  Anxiety, depression, bipolar disorder.  6.  Above-the-knee amputation on the right related to diabetes.  7.  Wheelchair-bound.  8.  Palpitation, intermittent in the last several months.  9.  History of intermittent chest pain once in a while.      Plan     The  most current meds and labs reviewdd    Patient Seen, Chart, Consults notes, Labs,

## 2024-09-09 ENCOUNTER — HOSPITAL ENCOUNTER (OUTPATIENT)
Age: 54
Discharge: HOME | End: 2024-09-09
Payer: MEDICARE

## 2024-09-09 DIAGNOSIS — Z91.199 NONCOMPLIANCE: ICD-10-CM

## 2024-09-09 DIAGNOSIS — I25.10 CORONARY ARTERY DISEASE INVOLVING NATIVE CORONARY ARTERY OF NATIVE HEART WITHOUT ANGINA PECTORIS: ICD-10-CM

## 2024-09-09 DIAGNOSIS — I73.9 PERIPHERAL VASCULAR DISEASE, UNSPECIFIED (HCC): ICD-10-CM

## 2024-09-09 DIAGNOSIS — I50.32 CHRONIC DIASTOLIC CONGESTIVE HEART FAILURE (HCC): ICD-10-CM

## 2024-09-09 DIAGNOSIS — E78.00 PURE HYPERCHOLESTEROLEMIA: ICD-10-CM

## 2024-09-09 DIAGNOSIS — Z95.820 S/P ANGIOPLASTY WITH STENT: ICD-10-CM

## 2024-09-09 DIAGNOSIS — I10 ESSENTIAL HYPERTENSION: ICD-10-CM

## 2024-09-09 LAB
ALBUMIN SERPL BCG-MCNC: 4.2 G/DL (ref 3.5–5.1)
ALP SERPL-CCNC: 75 U/L (ref 38–126)
ALT SERPL W/O P-5'-P-CCNC: 23 U/L (ref 11–66)
ANION GAP SERPL CALC-SCNC: 9 MEQ/L (ref 8–16)
AST SERPL-CCNC: 26 U/L (ref 5–40)
BILIRUB CONJ SERPL-MCNC: 0.2 MG/DL (ref 0.1–13.8)
BILIRUB SERPL-MCNC: 0.7 MG/DL (ref 0.3–1.2)
BUN SERPL-MCNC: 12 MG/DL (ref 7–22)
CALCIUM SERPL-MCNC: 9.3 MG/DL (ref 8.5–10.5)
CHLORIDE SERPL-SCNC: 101 MEQ/L (ref 98–111)
CHOLEST SERPL-MCNC: 140 MG/DL (ref 100–199)
CO2 SERPL-SCNC: 27 MEQ/L (ref 23–33)
CREAT SERPL-MCNC: 0.7 MG/DL (ref 0.4–1.2)
GFR SERPL CREATININE-BSD FRML MDRD: > 90 ML/MIN/1.73M2
GLUCOSE SERPL-MCNC: 156 MG/DL (ref 70–108)
HDLC SERPL-MCNC: 32 MG/DL
LDLC SERPL CALC-MCNC: 84 MG/DL
MAGNESIUM SERPL-MCNC: 2.1 MG/DL (ref 1.6–2.4)
POTASSIUM SERPL-SCNC: 4.7 MEQ/L (ref 3.5–5.2)
PROT SERPL-MCNC: 7.9 G/DL (ref 6.1–8)
SODIUM SERPL-SCNC: 137 MEQ/L (ref 135–145)
TRIGL SERPL-MCNC: 118 MG/DL (ref 0–199)

## 2024-09-09 PROCEDURE — 80061 LIPID PANEL: CPT

## 2024-09-09 PROCEDURE — 83735 ASSAY OF MAGNESIUM: CPT

## 2024-09-09 PROCEDURE — 36415 COLL VENOUS BLD VENIPUNCTURE: CPT

## 2024-09-09 PROCEDURE — 80053 COMPREHEN METABOLIC PANEL: CPT

## 2024-09-09 PROCEDURE — 82248 BILIRUBIN DIRECT: CPT

## 2024-09-10 DIAGNOSIS — I10 ESSENTIAL HYPERTENSION: ICD-10-CM

## 2024-09-10 DIAGNOSIS — E78.2 MIXED HYPERLIPIDEMIA: ICD-10-CM

## 2024-09-10 RX ORDER — ATORVASTATIN CALCIUM 80 MG/1
80 TABLET, FILM COATED ORAL NIGHTLY
Qty: 90 TABLET | Refills: 1 | Status: SHIPPED | OUTPATIENT
Start: 2024-09-10

## 2024-09-11 ENCOUNTER — OFFICE VISIT (OUTPATIENT)
Age: 54
End: 2024-09-11
Payer: MEDICARE

## 2024-09-11 VITALS
SYSTOLIC BLOOD PRESSURE: 120 MMHG | RESPIRATION RATE: 16 BRPM | DIASTOLIC BLOOD PRESSURE: 64 MMHG | WEIGHT: 210 LBS | HEART RATE: 91 BPM | BODY MASS INDEX: 33.75 KG/M2 | HEIGHT: 66 IN

## 2024-09-11 DIAGNOSIS — E03.9 ACQUIRED HYPOTHYROIDISM: ICD-10-CM

## 2024-09-11 DIAGNOSIS — Z79.4 TYPE 2 DIABETES MELLITUS WITH HYPERGLYCEMIA, WITH LONG-TERM CURRENT USE OF INSULIN (HCC): Primary | ICD-10-CM

## 2024-09-11 DIAGNOSIS — E11.65 TYPE 2 DIABETES MELLITUS WITH HYPERGLYCEMIA, WITH LONG-TERM CURRENT USE OF INSULIN (HCC): Primary | ICD-10-CM

## 2024-09-11 PROCEDURE — 1036F TOBACCO NON-USER: CPT | Performed by: INTERNAL MEDICINE

## 2024-09-11 PROCEDURE — 99214 OFFICE O/P EST MOD 30 MIN: CPT | Performed by: INTERNAL MEDICINE

## 2024-09-11 PROCEDURE — 2022F DILAT RTA XM EVC RTNOPTHY: CPT | Performed by: INTERNAL MEDICINE

## 2024-09-11 PROCEDURE — 3078F DIAST BP <80 MM HG: CPT | Performed by: INTERNAL MEDICINE

## 2024-09-11 PROCEDURE — G8417 CALC BMI ABV UP PARAM F/U: HCPCS | Performed by: INTERNAL MEDICINE

## 2024-09-11 PROCEDURE — 3074F SYST BP LT 130 MM HG: CPT | Performed by: INTERNAL MEDICINE

## 2024-09-11 PROCEDURE — 3051F HG A1C>EQUAL 7.0%<8.0%: CPT | Performed by: INTERNAL MEDICINE

## 2024-09-11 PROCEDURE — 3017F COLORECTAL CA SCREEN DOC REV: CPT | Performed by: INTERNAL MEDICINE

## 2024-09-11 PROCEDURE — G8427 DOCREV CUR MEDS BY ELIG CLIN: HCPCS | Performed by: INTERNAL MEDICINE

## 2024-09-11 RX ORDER — ACYCLOVIR 400 MG/1
TABLET ORAL
COMMUNITY

## 2024-09-11 RX ORDER — LEVOTHYROXINE SODIUM 50 UG/1
TABLET ORAL
Qty: 90 TABLET | Refills: 1 | Status: SHIPPED | OUTPATIENT
Start: 2024-09-11

## 2024-09-19 ENCOUNTER — HOSPITAL ENCOUNTER (OUTPATIENT)
Age: 54
Discharge: HOME | End: 2024-09-19
Payer: MEDICARE

## 2024-09-19 DIAGNOSIS — E03.9 ACQUIRED HYPOTHYROIDISM: ICD-10-CM

## 2024-09-19 LAB
DEPRECATED MEAN GLUCOSE BLD GHB EST-ACNC: 138 MG/DL (ref 70–126)
HBA1C MFR BLD HPLC: 6.6 % (ref 4.4–6.4)
T4 FREE SERPL-MCNC: 1.24 NG/DL (ref 0.93–1.68)
TSH SERPL DL<=0.005 MIU/L-ACNC: 1.94 UIU/ML (ref 0.4–4.2)

## 2024-09-19 PROCEDURE — 83036 HEMOGLOBIN GLYCOSYLATED A1C: CPT

## 2024-09-19 PROCEDURE — 36415 COLL VENOUS BLD VENIPUNCTURE: CPT

## 2024-09-19 PROCEDURE — 84443 ASSAY THYROID STIM HORMONE: CPT

## 2024-09-19 PROCEDURE — 84439 ASSAY OF FREE THYROXINE: CPT

## 2024-09-23 DIAGNOSIS — K21.9 GASTROESOPHAGEAL REFLUX DISEASE WITHOUT ESOPHAGITIS: ICD-10-CM

## 2024-09-23 RX ORDER — OMEPRAZOLE 40 MG/1
CAPSULE, DELAYED RELEASE ORAL
Qty: 90 CAPSULE | Refills: 1 | Status: SHIPPED | OUTPATIENT
Start: 2024-09-23

## 2024-10-02 NOTE — ED TRIAGE NOTES
Patient to room 10 from intake for complaint of worsening infection in his residual right leg. Patient was recently seen for this and started on Keflex and Bactrim. Patient reports that an area of redness was marked during his last visit and he feels that this is worsening. Patient also reports feeling fatigued, weak, and ill. His above the knee amputation was performed on 4/28 and he reports that he is concerned because he has lost approximately 50 lbs. Spoke with patient regarding overdue INR from 10/1. Assisted patient in scheduling lab appointment for Tuesday 10/8/24.

## 2024-10-05 ENCOUNTER — HOSPITAL ENCOUNTER (EMERGENCY)
Age: 54
Discharge: HOME | End: 2024-10-05
Payer: MEDICARE

## 2024-10-05 VITALS
RESPIRATION RATE: 20 BRPM | BODY MASS INDEX: 36.16 KG/M2 | OXYGEN SATURATION: 98 % | WEIGHT: 225 LBS | HEIGHT: 66 IN | DIASTOLIC BLOOD PRESSURE: 89 MMHG | HEART RATE: 95 BPM | SYSTOLIC BLOOD PRESSURE: 139 MMHG | TEMPERATURE: 98.1 F

## 2024-10-05 DIAGNOSIS — H61.21 IMPACTED CERUMEN OF RIGHT EAR: Primary | ICD-10-CM

## 2024-10-05 PROCEDURE — 99282 EMERGENCY DEPT VISIT SF MDM: CPT

## 2024-10-05 PROCEDURE — 69209 REMOVE IMPACTED EAR WAX UNI: CPT

## 2024-10-05 ASSESSMENT — PAIN - FUNCTIONAL ASSESSMENT
PAIN_FUNCTIONAL_ASSESSMENT: PREVENTS OR INTERFERES SOME ACTIVE ACTIVITIES AND ADLS
PAIN_FUNCTIONAL_ASSESSMENT: 0-10

## 2024-10-05 ASSESSMENT — PAIN DESCRIPTION - ORIENTATION: ORIENTATION: RIGHT

## 2024-10-05 ASSESSMENT — PAIN DESCRIPTION - DESCRIPTORS: DESCRIPTORS: THROBBING

## 2024-10-05 ASSESSMENT — PAIN SCALES - GENERAL: PAINLEVEL_OUTOF10: 9

## 2024-10-05 ASSESSMENT — PAIN DESCRIPTION - FREQUENCY: FREQUENCY: CONTINUOUS

## 2024-10-05 ASSESSMENT — PAIN DESCRIPTION - LOCATION: LOCATION: EAR

## 2024-10-05 NOTE — ED NOTES
Pt presents to ED c/o right ear pain. Pt states this has been happening for the last 2-3 days. Pt states excessive earwax that is black in color coming out.

## 2024-10-06 NOTE — DISCHARGE INSTRUCTIONS
You can use an over-the-counter medicine called Debrox as directed on the box to try and avoid further earwax impactions

## 2024-10-06 NOTE — ED PROVIDER NOTES
[facility] EMERGENCY DEPT      Pt Name: Jamie Spangler  MRN: [id number]  Birthdate 1970  Date of evaluation: 10/5/2024  Provider: Bobbi Hoyt PA-C    CHIEF COMPLAINT       Chief Complaint   Patient presents with    Otalgia     Right       Nurses Notes reviewed and I agree except as noted in the HPI.      HISTORY OF PRESENT ILLNESS    Jamie Spangler is a 54 y.o. male who presents through the lobby via wheelchair for right ear problem.  Patient reports for 2 days he is unable to hear out of his right ear.  There is some pain but no drainage.  Patient tried to use a washcloth without any improvement.  Patient has had this previously.  Patient denies headache, blurred vision, nausea, vomiting, dizziness, fever, chills, URI symptoms, or other complaints.       PAST MEDICAL HISTORY    has a past medical history of Atherosclerotic heart disease of native coronary artery with unspecified angina pectoris, Bipolar 2 disorder (HCC), BPH (benign prostatic hyperplasia), Chronic diastolic congestive heart failure (HCC), COPD (chronic obstructive pulmonary disease) (HCC), Diabetes mellitus type 2, uncontrolled, Diabetic peripheral neuropathy (HCC), Diabetic polyneuropathy (HCC), Diabetic ulcer of right foot associated with type 2 diabetes mellitus (HCC), Essential hypertension, GERD (gastroesophageal reflux disease), Hammer toe of left foot, Heart murmur, History of tobacco abuse, Hx of AKA (above knee amputation), right (HCC), Hyperlipidemia, Macular edema, diabetic, bilateral (HCC), Marijuana abuse in remission, Melena, MRSA (methicillin resistant staph aureus) culture positive, Onychomycosis, Other disorders of kidney and ureter in diseases classified elsewhere, Sleep apnea, Thyroid disease, and 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    SURGICAL HISTORY      has a past surgical history that includes other surgical history (Right, 1/14/15); Abscess Drainage (Right); Toe amputation

## 2024-10-07 ENCOUNTER — TELEPHONE (OUTPATIENT)
Dept: INTERNAL MEDICINE CLINIC | Age: 54
End: 2024-10-07

## 2024-10-28 DIAGNOSIS — I10 ESSENTIAL HYPERTENSION: ICD-10-CM

## 2024-10-28 DIAGNOSIS — E78.2 MIXED HYPERLIPIDEMIA: ICD-10-CM

## 2024-10-28 DIAGNOSIS — E11.65 TYPE 2 DIABETES MELLITUS WITH HYPERGLYCEMIA, WITH LONG-TERM CURRENT USE OF INSULIN (HCC): ICD-10-CM

## 2024-10-28 DIAGNOSIS — Z79.4 TYPE 2 DIABETES MELLITUS WITH HYPERGLYCEMIA, WITH LONG-TERM CURRENT USE OF INSULIN (HCC): ICD-10-CM

## 2024-10-29 RX ORDER — ATORVASTATIN CALCIUM 80 MG/1
80 TABLET, FILM COATED ORAL NIGHTLY
Qty: 90 TABLET | Refills: 1 | Status: SHIPPED | OUTPATIENT
Start: 2024-10-29

## 2024-10-29 RX ORDER — LISINOPRIL 5 MG/1
5 TABLET ORAL DAILY
Qty: 30 TABLET | Refills: 5 | OUTPATIENT
Start: 2024-10-29

## 2024-10-29 RX ORDER — METOPROLOL TARTRATE 25 MG/1
25 TABLET, FILM COATED ORAL 2 TIMES DAILY
Qty: 180 TABLET | Refills: 4 | Status: SHIPPED | OUTPATIENT
Start: 2024-10-29

## 2024-10-29 NOTE — TELEPHONE ENCOUNTER
Recent Visits  Date Type Provider Dept   07/24/24 Office Visit EstebanJuan, APRN - CNP [facility]   04/03/24 Office Visit EstebanJuan, APRN - CNP [facility]   01/02/24 Office Visit EstebanJuan, APRN - CNP [facility]   09/26/23 Office Visit EstebanJuan, APRN - CNP [facility]   Showing recent visits within past 540 days with a meds authorizing provider and meeting all other requirements  Future Appointments  No visits were found meeting these conditions.  Showing future appointments within next 150 days with a meds authorizing provider and meeting all other requirements

## 2024-11-04 DIAGNOSIS — E66.09 CLASS 2 OBESITY DUE TO EXCESS CALORIES WITH BODY MASS INDEX (BMI) OF 36.0 TO 36.9 IN ADULT, UNSPECIFIED WHETHER SERIOUS COMORBIDITY PRESENT: ICD-10-CM

## 2024-11-04 DIAGNOSIS — E11.65 TYPE 2 DIABETES MELLITUS WITH HYPERGLYCEMIA, WITH LONG-TERM CURRENT USE OF INSULIN (HCC): ICD-10-CM

## 2024-11-04 DIAGNOSIS — E66.812 CLASS 2 OBESITY DUE TO EXCESS CALORIES WITH BODY MASS INDEX (BMI) OF 36.0 TO 36.9 IN ADULT, UNSPECIFIED WHETHER SERIOUS COMORBIDITY PRESENT: ICD-10-CM

## 2024-11-04 DIAGNOSIS — Z79.4 TYPE 2 DIABETES MELLITUS WITH HYPERGLYCEMIA, WITH LONG-TERM CURRENT USE OF INSULIN (HCC): ICD-10-CM

## 2024-11-05 RX ORDER — DULAGLUTIDE 4.5 MG/.5ML
INJECTION, SOLUTION SUBCUTANEOUS
Qty: 6 ML | Refills: 0 | Status: SHIPPED | OUTPATIENT
Start: 2024-11-05

## 2024-11-26 ENCOUNTER — OFFICE VISIT (OUTPATIENT)
Dept: FAMILY MEDICINE CLINIC | Age: 54
End: 2024-11-26

## 2024-11-26 VITALS
SYSTOLIC BLOOD PRESSURE: 136 MMHG | OXYGEN SATURATION: 97 % | HEART RATE: 80 BPM | RESPIRATION RATE: 16 BRPM | DIASTOLIC BLOOD PRESSURE: 78 MMHG | TEMPERATURE: 98.3 F

## 2024-11-26 DIAGNOSIS — R13.19 ESOPHAGEAL DYSPHAGIA: Primary | ICD-10-CM

## 2024-11-26 DIAGNOSIS — R53.81 PHYSICAL DEBILITY: ICD-10-CM

## 2024-11-26 DIAGNOSIS — K21.9 GASTROESOPHAGEAL REFLUX DISEASE WITHOUT ESOPHAGITIS: ICD-10-CM

## 2024-11-26 DIAGNOSIS — S78.111A ABOVE KNEE AMPUTATION OF RIGHT LOWER EXTREMITY (HCC): ICD-10-CM

## 2024-11-26 DIAGNOSIS — F33.41 RECURRENT MAJOR DEPRESSIVE DISORDER, IN PARTIAL REMISSION (HCC): ICD-10-CM

## 2024-11-26 DIAGNOSIS — R13.10 DYSPHAGIA, UNSPECIFIED TYPE: ICD-10-CM

## 2024-11-26 DIAGNOSIS — E11.59 TYPE 2 DIABETES MELLITUS WITH OTHER CIRCULATORY COMPLICATION, WITH LONG-TERM CURRENT USE OF INSULIN (HCC): ICD-10-CM

## 2024-11-26 DIAGNOSIS — I10 ESSENTIAL HYPERTENSION: ICD-10-CM

## 2024-11-26 DIAGNOSIS — Z95.820 S/P ANGIOPLASTY WITH STENT: ICD-10-CM

## 2024-11-26 DIAGNOSIS — G54.6 PHANTOM LIMB SYNDROME WITH PAIN (HCC): ICD-10-CM

## 2024-11-26 DIAGNOSIS — E78.00 PURE HYPERCHOLESTEROLEMIA: ICD-10-CM

## 2024-11-26 DIAGNOSIS — G62.9 NEUROPATHY: ICD-10-CM

## 2024-11-26 DIAGNOSIS — I50.32 CHRONIC DIASTOLIC CONGESTIVE HEART FAILURE (HCC): ICD-10-CM

## 2024-11-26 DIAGNOSIS — Z79.4 TYPE 2 DIABETES MELLITUS WITH OTHER CIRCULATORY COMPLICATION, WITH LONG-TERM CURRENT USE OF INSULIN (HCC): ICD-10-CM

## 2024-11-26 DIAGNOSIS — I73.9 PERIPHERAL VASCULAR DISEASE, UNSPECIFIED (HCC): ICD-10-CM

## 2024-11-26 PROBLEM — R42 VERTIGO: Status: RESOLVED | Noted: 2023-07-18 | Resolved: 2024-11-26

## 2024-11-26 PROBLEM — F19.10 OTHER PSYCHOACTIVE SUBSTANCE ABUSE, UNCOMPLICATED (HCC): Status: RESOLVED | Noted: 2020-03-12 | Resolved: 2024-11-26

## 2024-11-26 PROBLEM — I21.4 NSTEMI (NON-ST ELEVATED MYOCARDIAL INFARCTION) (HCC): Status: RESOLVED | Noted: 2021-07-27 | Resolved: 2024-11-26

## 2024-11-26 PROBLEM — R06.02 SOB (SHORTNESS OF BREATH) ON EXERTION: Status: RESOLVED | Noted: 2020-09-25 | Resolved: 2024-11-26

## 2024-11-26 RX ORDER — PREGABALIN 75 MG/1
75 CAPSULE ORAL 3 TIMES DAILY
Qty: 90 CAPSULE | Refills: 3 | Status: SHIPPED | OUTPATIENT
Start: 2024-11-26 | End: 2025-03-26

## 2024-11-26 RX ORDER — ACYCLOVIR 400 MG/1
TABLET ORAL
Status: CANCELLED | OUTPATIENT
Start: 2024-11-26

## 2024-11-26 RX ORDER — ACYCLOVIR 400 MG/1
TABLET ORAL
COMMUNITY

## 2024-11-26 ASSESSMENT — ENCOUNTER SYMPTOMS
ABDOMINAL DISTENTION: 0
BLOOD IN STOOL: 0
NAUSEA: 1
RESPIRATORY NEGATIVE: 1
CONSTIPATION: 0
VOMITING: 1
RECTAL PAIN: 0
ABDOMINAL PAIN: 0
DIARRHEA: 0
PHOTOPHOBIA: 0

## 2024-11-26 NOTE — PROGRESS NOTES
type    - [facility] - Aristeo Hernandez MD, Gastroenterology, [place]    14. Phantom limb syndrome with pain (HCC)  - pregabalin (LYRICA) 75 MG capsule; Take 1 capsule by mouth 3 times daily for 120 days. Max Daily Amount: 225 mg  Dispense: 90 capsule; Refill: 3    Controlled substances monitoring: possible medication side effects, risk of tolerance and/or dependence, and alternative treatments discussed, no signs of potential drug abuse or diversion identified, and OARRS report reviewed today- activity consistent with treatment plan.     Return in about 3 months (around 2/26/2025) for f/u pain .    Reccommended tobaccocessation options including pharmacologic methods, counseled great than 3 minutesduring this visit:  Yes[]  No  []       Patient given educational materials -see patient instructions.  Discussed use, benefit, and side effects of prescribedmedications.  All patient questions answered.  Pt voiced understanding. Reviewedhealth maintenance.  Instructed to continue current medications, diet and exercise.Patient agreed with treatment plan. Follow up as directed.       Electronicallysigned by MARILYN Garcia - CNP on 11/26/2024 at 10:32 AM

## 2024-12-02 DIAGNOSIS — E66.09 CLASS 2 OBESITY DUE TO EXCESS CALORIES WITH BODY MASS INDEX (BMI) OF 36.0 TO 36.9 IN ADULT, UNSPECIFIED WHETHER SERIOUS COMORBIDITY PRESENT: ICD-10-CM

## 2024-12-02 DIAGNOSIS — E66.812 CLASS 2 OBESITY DUE TO EXCESS CALORIES WITH BODY MASS INDEX (BMI) OF 36.0 TO 36.9 IN ADULT, UNSPECIFIED WHETHER SERIOUS COMORBIDITY PRESENT: ICD-10-CM

## 2024-12-02 DIAGNOSIS — E11.65 TYPE 2 DIABETES MELLITUS WITH HYPERGLYCEMIA, WITH LONG-TERM CURRENT USE OF INSULIN (HCC): ICD-10-CM

## 2024-12-02 DIAGNOSIS — Z79.4 TYPE 2 DIABETES MELLITUS WITH HYPERGLYCEMIA, WITH LONG-TERM CURRENT USE OF INSULIN (HCC): ICD-10-CM

## 2024-12-02 RX ORDER — ACYCLOVIR 400 MG/1
TABLET ORAL
Qty: 9 EACH | Refills: 0 | Status: SHIPPED | OUTPATIENT
Start: 2024-12-02

## 2024-12-02 RX ORDER — DULAGLUTIDE 4.5 MG/.5ML
INJECTION, SOLUTION SUBCUTANEOUS
Qty: 6 ML | Refills: 1 | Status: SHIPPED | OUTPATIENT
Start: 2024-12-02

## 2024-12-02 RX ORDER — ACYCLOVIR 400 MG/1
TABLET ORAL
Status: CANCELLED | OUTPATIENT
Start: 2024-12-02

## 2024-12-10 ENCOUNTER — HOSPITAL ENCOUNTER (EMERGENCY)
Age: 54
Discharge: HOME | End: 2024-12-10
Attending: EMERGENCY MEDICINE
Payer: MEDICARE

## 2024-12-10 ENCOUNTER — APPOINTMENT (OUTPATIENT)
Dept: GENERAL RADIOLOGY | Age: 54
End: 2024-12-10
Payer: MEDICARE

## 2024-12-10 ENCOUNTER — APPOINTMENT (OUTPATIENT)
Dept: CT IMAGING | Age: 54
End: 2024-12-10
Payer: MEDICARE

## 2024-12-10 ENCOUNTER — APPOINTMENT (OUTPATIENT)
Dept: MRI IMAGING | Age: 54
End: 2024-12-10
Payer: MEDICARE

## 2024-12-10 VITALS
SYSTOLIC BLOOD PRESSURE: 127 MMHG | DIASTOLIC BLOOD PRESSURE: 79 MMHG | RESPIRATION RATE: 16 BRPM | WEIGHT: 225 LBS | TEMPERATURE: 98.3 F | BODY MASS INDEX: 36.32 KG/M2 | OXYGEN SATURATION: 96 % | HEART RATE: 87 BPM

## 2024-12-10 DIAGNOSIS — H53.8 BLURRY VISION, RIGHT EYE: Primary | ICD-10-CM

## 2024-12-10 LAB
ALBUMIN SERPL BCG-MCNC: 3.8 G/DL (ref 3.5–5.1)
ALP SERPL-CCNC: 76 U/L (ref 38–126)
ALT SERPL W/O P-5'-P-CCNC: 26 U/L (ref 11–66)
ANION GAP SERPL CALC-SCNC: 12 MEQ/L (ref 8–16)
ANION GAP SERPL CALC-SCNC: 14 MEQ/L (ref 8–16)
APTT PPP: 22 SECONDS (ref 22–38)
AST SERPL-CCNC: 35 U/L (ref 5–40)
BASOPHILS ABSOLUTE: 0.1 THOU/MM3 (ref 0–0.1)
BASOPHILS NFR BLD AUTO: 0.9 %
BILIRUB SERPL-MCNC: 0.4 MG/DL (ref 0.3–1.2)
BUN SERPL-MCNC: 16 MG/DL (ref 7–22)
BUN SERPL-MCNC: 17 MG/DL (ref 7–22)
CALCIUM SERPL-MCNC: 9 MG/DL (ref 8.5–10.5)
CALCIUM SERPL-MCNC: 9.1 MG/DL (ref 8.5–10.5)
CHLORIDE SERPL-SCNC: 96 MEQ/L (ref 98–111)
CHLORIDE SERPL-SCNC: 98 MEQ/L (ref 98–111)
CO2 SERPL-SCNC: 20 MEQ/L (ref 23–33)
CO2 SERPL-SCNC: 25 MEQ/L (ref 23–33)
CREAT SERPL-MCNC: 0.7 MG/DL (ref 0.4–1.2)
CREAT SERPL-MCNC: 0.9 MG/DL (ref 0.4–1.2)
DEPRECATED RDW RBC AUTO: 46.8 FL (ref 35–45)
EKG ATRIAL RATE: 88 BPM
EKG P AXIS: 6 DEGREES
EKG P-R INTERVAL: 188 MS
EKG Q-T INTERVAL: 354 MS
EKG QRS DURATION: 76 MS
EKG QTC CALCULATION (BAZETT): 428 MS
EKG R AXIS: 7 DEGREES
EKG T AXIS: 23 DEGREES
EKG VENTRICULAR RATE: 88 BPM
EOSINOPHIL NFR BLD AUTO: 4.6 %
EOSINOPHILS ABSOLUTE: 0.4 THOU/MM3 (ref 0–0.4)
ERYTHROCYTE [DISTWIDTH] IN BLOOD BY AUTOMATED COUNT: 14 % (ref 11.5–14.5)
FOLATE SERPL-MCNC: 13.2 NG/ML (ref 4.8–24.2)
GFR SERPL CREATININE-BSD FRML MDRD: > 90 ML/MIN/1.73M2
GFR SERPL CREATININE-BSD FRML MDRD: > 90 ML/MIN/1.73M2
GLUCOSE BLD STRIP.AUTO-MCNC: 446 MG/DL (ref 70–108)
GLUCOSE SERPL-MCNC: 363 MG/DL (ref 70–108)
GLUCOSE SERPL-MCNC: 370 MG/DL (ref 70–108)
HCT VFR BLD AUTO: 46.3 % (ref 42–52)
HGB BLD-MCNC: 16.2 GM/DL (ref 14–18)
IMM GRANULOCYTES # BLD AUTO: 0.08 THOU/MM3 (ref 0–0.07)
IMM GRANULOCYTES NFR BLD AUTO: 0.9 %
INR PPP: 0.96 (ref 0.85–1.13)
LYMPHOCYTES ABSOLUTE: 2.2 THOU/MM3 (ref 1–4.8)
LYMPHOCYTES NFR BLD AUTO: 25.4 %
MCH RBC QN AUTO: 32 PG (ref 26–33)
MCHC RBC AUTO-ENTMCNC: 35 GM/DL (ref 32.2–35.5)
MCV RBC AUTO: 91.5 FL (ref 80–94)
MONOCYTES ABSOLUTE: 0.8 THOU/MM3 (ref 0.4–1.3)
MONOCYTES NFR BLD AUTO: 8.7 %
NEUTROPHILS ABSOLUTE: 5.2 THOU/MM3 (ref 1.8–7.7)
NEUTROPHILS NFR BLD AUTO: 59.5 %
NRBC BLD AUTO-RTO: 0 /100 WBC
OSMOLALITY SERPL CALC.SUM OF ELEC: 280.8 MOSMOL/KG (ref 275–300)
OSMOLALITY SERPL CALC.SUM OF ELEC: 282.6 MOSMOL/KG (ref 275–300)
PLATELET # BLD AUTO: 190 THOU/MM3 (ref 130–400)
PMV BLD AUTO: 9.7 FL (ref 9.4–12.4)
POC CREATININE WHOLE BLOOD: 0.8 MG/DL (ref 0.5–1.2)
POTASSIUM SERPL-SCNC: 4.3 MEQ/L (ref 3.5–5.2)
POTASSIUM SERPL-SCNC: 4.6 MEQ/L (ref 3.5–5.2)
PROT SERPL-MCNC: 7 G/DL (ref 6.1–8)
RBC # BLD AUTO: 5.06 MILL/MM3 (ref 4.7–6.1)
SODIUM SERPL-SCNC: 132 MEQ/L (ref 135–145)
SODIUM SERPL-SCNC: 133 MEQ/L (ref 135–145)
TROPONIN, HIGH SENSITIVITY: 40 NG/L (ref 0–12)
VIT B12 SERPL-MCNC: 1225 PG/ML (ref 211–911)
WBC # BLD AUTO: 8.7 THOU/MM3 (ref 4.8–10.8)

## 2024-12-10 PROCEDURE — 84484 ASSAY OF TROPONIN QUANT: CPT

## 2024-12-10 PROCEDURE — 93005 ELECTROCARDIOGRAM TRACING: CPT | Performed by: EMERGENCY MEDICINE

## 2024-12-10 PROCEDURE — 82565 ASSAY OF CREATININE: CPT

## 2024-12-10 PROCEDURE — 6360000004 HC RX CONTRAST MEDICATION: Performed by: EMERGENCY MEDICINE

## 2024-12-10 PROCEDURE — 70551 MRI BRAIN STEM W/O DYE: CPT

## 2024-12-10 PROCEDURE — 82607 VITAMIN B-12: CPT

## 2024-12-10 PROCEDURE — 85610 PROTHROMBIN TIME: CPT

## 2024-12-10 PROCEDURE — 70496 CT ANGIOGRAPHY HEAD: CPT

## 2024-12-10 PROCEDURE — 70498 CT ANGIOGRAPHY NECK: CPT

## 2024-12-10 PROCEDURE — 82948 REAGENT STRIP/BLOOD GLUCOSE: CPT

## 2024-12-10 PROCEDURE — 85025 COMPLETE CBC W/AUTO DIFF WBC: CPT

## 2024-12-10 PROCEDURE — 96374 THER/PROPH/DIAG INJ IV PUSH: CPT

## 2024-12-10 PROCEDURE — 70450 CT HEAD/BRAIN W/O DYE: CPT

## 2024-12-10 PROCEDURE — 71045 X-RAY EXAM CHEST 1 VIEW: CPT

## 2024-12-10 PROCEDURE — 93010 ELECTROCARDIOGRAM REPORT: CPT | Performed by: NUCLEAR MEDICINE

## 2024-12-10 PROCEDURE — 36415 COLL VENOUS BLD VENIPUNCTURE: CPT

## 2024-12-10 PROCEDURE — 82746 ASSAY OF FOLIC ACID SERUM: CPT

## 2024-12-10 PROCEDURE — 99285 EMERGENCY DEPT VISIT HI MDM: CPT

## 2024-12-10 PROCEDURE — 99291 CRITICAL CARE FIRST HOUR: CPT

## 2024-12-10 PROCEDURE — 85730 THROMBOPLASTIN TIME PARTIAL: CPT

## 2024-12-10 PROCEDURE — 80053 COMPREHEN METABOLIC PANEL: CPT

## 2024-12-10 PROCEDURE — 6360000002 HC RX W HCPCS

## 2024-12-10 RX ORDER — LORAZEPAM 2 MG/ML
2 INJECTION INTRAMUSCULAR ONCE
Status: COMPLETED | OUTPATIENT
Start: 2024-12-10 | End: 2024-12-10

## 2024-12-10 RX ORDER — IOPAMIDOL 755 MG/ML
80 INJECTION, SOLUTION INTRAVASCULAR
Status: COMPLETED | OUTPATIENT
Start: 2024-12-10 | End: 2024-12-10

## 2024-12-10 RX ADMIN — LORAZEPAM 2 MG: 2 INJECTION INTRAMUSCULAR; INTRAVENOUS at 16:26

## 2024-12-10 RX ADMIN — IOPAMIDOL 80 ML: 755 INJECTION, SOLUTION INTRAVENOUS at 15:32

## 2024-12-10 ASSESSMENT — VISUAL ACUITY
OD: 20/200
OU: 20/100
OS: 20/100

## 2024-12-10 NOTE — ED NOTES
Pt in MRI, stating claustrophobic and can't do the MRI. Offered pt medication and pt agreed to try to complete MRI. Dr. Means notified and states will order medication.

## 2024-12-10 NOTE — CONSULTS
MOUTH ONCE A DAY 30 tablet 12    lisinopril (PRINIVIL;ZESTRIL) 5 MG tablet Take 1 tablet by mouth daily Start this med in two days from 2/4/23 30 tablet 5    furosemide (LASIX) 20 MG tablet Take 1 tablet by mouth daily 60 tablet 5    ascorbic acid (VITAMIN C) 500 MG tablet Take 1 tablet by mouth daily 30 tablet 3    Cholecalciferol (VITAMIN D) 25 MCG TABS Take 1 tablet by mouth daily 60 tablet 0    Multiple Vitamins-Minerals (EYE VITAMINS PO) Take 1 tablet by mouth daily      Omega-3 Fatty Acids (FISH OIL PO) Take 1 caplet by mouth daily      Probiotic Product (PROBIOTIC PO) Take 1 tablet by mouth daily      Continuous Blood Gluc Sensor (DEXCOM G6 SENSOR) MISC 1 Device by Does not apply route every 14 days (Patient not taking: Reported on 9/11/2024) 9 each 3    Continuous Blood Gluc Transmit (DEXCOM G6 TRANSMITTER) MISC 1 Device by Does not apply route every 3 months (Patient not taking: Reported on 9/11/2024) 1 each 3    nitroGLYCERIN (NITROSTAT) 0.3 MG SL tablet Place 1 tablet under the tongue every 5 minutes as needed for Chest pain up to max of 3 total doses. If no relief after 1 dose, call 911. 30 tablet 3    Insulin Pen Needle (TRUEPLUS PEN NEEDLES) 31G X 8 MM MISC USE AS DIRECTED 200 each 1    sertraline (ZOLOFT) 100 MG tablet Take 1 tablet by mouth daily      Continuous Blood Gluc  (DEXCOM G6 ) LIANNE 1 Device by Does not apply route 4 times daily (before meals and nightly) (Patient not taking: Reported on 9/11/2024) 1 Device 0    ARIPiprazole (ABILIFY) 5 MG tablet Take 1 tablet by mouth daily         Past History    Past Medical History:   has a past medical history of Atherosclerotic heart disease of native coronary artery with unspecified angina pectoris, Bipolar 2 disorder ([facility]), BPH (benign prostatic hyperplasia), Chronic diastolic congestive heart failure ([facility]), COPD (chronic obstructive pulmonary disease) ([facility]), Diabetes mellitus type 2, uncontrolled, Diabetic peripheral neuropathy

## 2024-12-10 NOTE — ED PROVIDER NOTES
Transfer of Care Note:   Physician Signing out: Goldie Salgado MD  Receiving Physician: Chepe Means MD  Sign out time: 4 PM      Brief history:  Patient 54-year-old male presenting to ED for right sided vision changes.  States that he woke this morning 6 AM noted top half of his right eye said to be blurry and brown.  Denies any previous episodes of this.  Denies headache, eye pain, eye discharge.    POCUS ultrasound ocular negative for retinal detachment, vitreous hemorrhage or detachment.  Items pending that need to be checked:  Brain MRI      Tentative Impression of patient:  Right eye vision change    Expected disposition of patient:  Pending results, discharged.        Additional Assessment and results:   I have personally performed a face to face diagnostic evaluation on this patient. The patient's initial evaluation and plan have been discussed with the prior physician who initially evaluated the patient. Nursing Notes, Past Medical Hx, Past Surgical Hx, Social Hx, Allergies, vital signs and Family Hx were all reviewed.      Vitals:    12/10/24 1834   BP: 127/79   Pulse: 87   Resp: 16   Temp:    SpO2: 96%     Physical Exam  Constitutional:       General: He is not in acute distress.     Appearance: He is obese. He is not ill-appearing.   HENT:      Head: Normocephalic and atraumatic.      Nose: Nose normal.      Mouth/Throat:      Mouth: Mucous membranes are moist.      Pharynx: Oropharynx is clear.   Eyes:      General: No scleral icterus.        Right eye: No discharge.         Left eye: No discharge.      Extraocular Movements: Extraocular movements intact.      Conjunctiva/sclera: Conjunctivae normal.      Pupils: Pupils are equal, round, and reactive to light.   Cardiovascular:      Rate and Rhythm: Normal rate and regular rhythm.   Pulmonary:      Effort: Pulmonary effort is normal. No respiratory distress.   Skin:     General: Skin is warm and dry.   Neurological:      Mental Status: He is 
PATIENT NAME: Jamie Spangler  MRN: [id number]  : 1970  ROCHE: 12/10/2024    I performed a history and physical examination of the patient and discussed management with the Resident. I reviewed the Resident's note and agree with the documented findings and plan of care. Any areas of disagreement are noted on the chart. I was personally present for the key portions of any procedures and have documented in the chart those procedures where I was not present during the key portions. I have reviewed the emergency nurses triage note and agree with the chief complaint, past medical history, past surgical history, allergies, medications, social and family history as documented unless otherwise noted below.    MEDICAL DEDISION MAKING (MDM)     Jamie Spangler is a 54 y.o. male who presents to Emergency Department with Blurred Vision     He noticed right side eye top part blurry since 6 am when he woke up. No headache, No eye pain. Last time well was 11 pm last night.     Hx of HTN, HLD, Smoker, thyroidism, COPD, HLD, CHF and CAD with PCI (Last LHC on 23 showed patent L main, LAD, LCx, RCA patent stent, and preserved LVEF).      A stroke alert was called and neuro felt this may be peripheral retinal A occlusion vs globe pathology causing blurry vision.     EKG interpreted by me as normal sinus rhythm, VR 88 bpm, NH interval 188 ms, QRS 76 ms,  ms, nonspecific ST abnormality, no acute ischemic change.     Pending brain MRI. Signed out to Dr. Pittman and his resident. Plan to discharge with ophthalmology follow up. He is on ASA already.     Vitals:    12/10/24 1253 12/10/24 1543 12/10/24 1726   BP: (!) 155/84 (!) 157/61 (!) 142/65   Pulse: 99 85 84   Resp: 16 16 16   Temp: 98.3 °F (36.8 °C)     TempSrc: Oral     SpO2: 96% 96% 96%   Weight: 102.1 kg (225 lb)       Labs Reviewed   CBC WITH AUTO DIFFERENTIAL - Abnormal; Notable for the following components:       Result Value    RDW-SD 46.8 (*)     Immature Grans 
and interpreted by me in the clinical context of this patient.  All EKGs are also interpreted by our Cardiology department, final interpretation may not be available as of the writing of this note.      PREVIOUS RECORDS  AND EXTERNAL INFORMATION REVIEWED   History obtained from: the patient.    Pertinent previous and/or external records reviewed: Noncontributory.    Case discussed with specialties other than Emergency Medicine: Not Applicable      MEDICAL DECISION MAKING   Initial Assessment Summary:   Patient presenting with sudden vision changes that has been constant since this morning.  Please see ED course and MDM sections below for continuation and resolution of this initial assessment if applicable.    Initial plan:   CBC, BMP, B12 and folate  EKG  MRI brain with and without con       Comorbid conditions pertinent to this ED encounter:  Diabetes, hypertension, CAD s/p stent of RCA done 11/2022      Differential Diagnosis includes but is not limited to:  Diabetic retinopathy versus retinal detachment versus brain versus cataracts versus abrasion    Decision Rules/Clinical Scores utilized:  Not Applicable         Code Status:  Not addressed during this ED visit    Social determinants of health impacting treatment or disposition:  Considered and not applicable     MIPS documentation:  N/A    Medical Decision Making  Patient presenting with blurry vision noted on right eye that has been constant since this morning.  Denies any eye pain, headaches, or any eye discharge.  Labs obtained and remarkable for glucose on admission of 446.  CBC unremarkable.  BMP noted glucose at 370, sodium 133.  Corrected sodium 137.  B12 and folate ordered and stable.  Patient initially declining to get EKG done, however discussed risks and benefits and patient agreeable at this time.  EKG reviewed and did not show any atrial fibrillation or ST/T wave elevation/depressions.  Given patient has negative HINTS exam and a normal

## 2024-12-10 NOTE — ED NOTES
Pt lying in bed. States wants to know his results and POC. Notified Dr. Means pt would like to speak to him. States would go talk with pt. Pt denies other needs. Call light in reach.

## 2024-12-10 NOTE — DISCHARGE INSTRUCTIONS
You were seen emergency department today for blurry vision from the right eye.  CT scan of your head and neck, MRI of your brain showed no stroke at this time.    Follow-up with ophthalmology within 24 hours for evaluation of the vision changes you are having.    Return to emergency department anytime for significant worsening of your vision, loss of vision, any other worrisome changes or concerns.

## 2024-12-10 NOTE — ED NOTES
Pt to er. Pt c/o blurry vision in rt eye. States started last night when his BS was low. States also had an episode this morning when his BS was low. Pt denies any other symptoms. Pt BS upon arrival 446. Pt denies any weakness, slurred speech. Resp regular. Call light in reach. Pt refusing EKG at this time, stating \"I dont want one of those its not my heart.\"

## 2024-12-10 NOTE — PROCEDURES
PROCEDURE NOTE  Date: 12/10/2024   Name: Jamie Spangler  YOB: 1970    Procedures  12 lead EKG completed. Results handed to Alyssa FIGUEREDO. Arabella MARCOS

## 2024-12-23 ENCOUNTER — TELEPHONE (OUTPATIENT)
Age: 54
End: 2024-12-23

## 2024-12-23 DIAGNOSIS — Z79.4 TYPE 2 DIABETES MELLITUS WITH HYPERGLYCEMIA, WITH LONG-TERM CURRENT USE OF INSULIN (HCC): ICD-10-CM

## 2024-12-23 DIAGNOSIS — E11.65 TYPE 2 DIABETES MELLITUS WITH HYPERGLYCEMIA, WITH LONG-TERM CURRENT USE OF INSULIN (HCC): ICD-10-CM

## 2024-12-23 RX ORDER — ACYCLOVIR 400 MG/1
TABLET ORAL
Qty: 1 EACH | Refills: 0 | Status: SHIPPED | OUTPATIENT
Start: 2024-12-23

## 2024-12-26 ENCOUNTER — APPOINTMENT (OUTPATIENT)
Dept: CT IMAGING | Age: 54
End: 2024-12-26
Payer: MEDICARE

## 2024-12-26 ENCOUNTER — HOSPITAL ENCOUNTER (EMERGENCY)
Age: 54
Discharge: HOME | End: 2024-12-26
Attending: EMERGENCY MEDICINE
Payer: MEDICARE

## 2024-12-26 VITALS
HEIGHT: 66 IN | OXYGEN SATURATION: 97 % | TEMPERATURE: 98.2 F | BODY MASS INDEX: 36.32 KG/M2 | DIASTOLIC BLOOD PRESSURE: 66 MMHG | RESPIRATION RATE: 16 BRPM | HEART RATE: 98 BPM | SYSTOLIC BLOOD PRESSURE: 123 MMHG

## 2024-12-26 DIAGNOSIS — H34.231 RETINAL ARTERY OCCLUSION, BRANCH, RIGHT: Primary | ICD-10-CM

## 2024-12-26 LAB
ALBUMIN SERPL BCG-MCNC: 3.9 G/DL (ref 3.5–5.1)
ALP SERPL-CCNC: 68 U/L (ref 38–126)
ALT SERPL W/O P-5'-P-CCNC: 33 U/L (ref 11–66)
ANION GAP SERPL CALC-SCNC: 11 MEQ/L (ref 8–16)
AST SERPL-CCNC: 37 U/L (ref 5–40)
BASOPHILS ABSOLUTE: 0.1 THOU/MM3 (ref 0–0.1)
BASOPHILS NFR BLD AUTO: 0.6 %
BILIRUB SERPL-MCNC: 0.5 MG/DL (ref 0.3–1.2)
BUN SERPL-MCNC: 15 MG/DL (ref 7–22)
CALCIUM SERPL-MCNC: 9.4 MG/DL (ref 8.5–10.5)
CHLORIDE SERPL-SCNC: 102 MEQ/L (ref 98–111)
CO2 SERPL-SCNC: 24 MEQ/L (ref 23–33)
CREAT SERPL-MCNC: 1 MG/DL (ref 0.4–1.2)
DEPRECATED RDW RBC AUTO: 46.8 FL (ref 35–45)
EOSINOPHIL NFR BLD AUTO: 3.7 %
EOSINOPHILS ABSOLUTE: 0.4 THOU/MM3 (ref 0–0.4)
ERYTHROCYTE [DISTWIDTH] IN BLOOD BY AUTOMATED COUNT: 14 % (ref 11.5–14.5)
GFR SERPL CREATININE-BSD FRML MDRD: 88 ML/MIN/1.73M2
GLUCOSE SERPL-MCNC: 67 MG/DL (ref 70–108)
HCT VFR BLD AUTO: 45.8 % (ref 42–52)
HGB BLD-MCNC: 15.8 GM/DL (ref 14–18)
IMM GRANULOCYTES # BLD AUTO: 0.08 THOU/MM3 (ref 0–0.07)
IMM GRANULOCYTES NFR BLD AUTO: 0.7 %
LYMPHOCYTES ABSOLUTE: 2.5 THOU/MM3 (ref 1–4.8)
LYMPHOCYTES NFR BLD AUTO: 22.5 %
MCH RBC QN AUTO: 31.6 PG (ref 26–33)
MCHC RBC AUTO-ENTMCNC: 34.5 GM/DL (ref 32.2–35.5)
MCV RBC AUTO: 91.6 FL (ref 80–94)
MONOCYTES ABSOLUTE: 1 THOU/MM3 (ref 0.4–1.3)
MONOCYTES NFR BLD AUTO: 9.1 %
NEUTROPHILS ABSOLUTE: 7 THOU/MM3 (ref 1.8–7.7)
NEUTROPHILS NFR BLD AUTO: 63.4 %
NRBC BLD AUTO-RTO: 0 /100 WBC
OSMOLALITY SERPL CALC.SUM OF ELEC: 272.9 MOSMOL/KG (ref 275–300)
PLATELET # BLD AUTO: 217 THOU/MM3 (ref 130–400)
PMV BLD AUTO: 9.3 FL (ref 9.4–12.4)
POTASSIUM SERPL-SCNC: 3.7 MEQ/L (ref 3.5–5.2)
PROT SERPL-MCNC: 7.2 G/DL (ref 6.1–8)
RBC # BLD AUTO: 5 MILL/MM3 (ref 4.7–6.1)
SODIUM SERPL-SCNC: 137 MEQ/L (ref 135–145)
WBC # BLD AUTO: 11.1 THOU/MM3 (ref 4.8–10.8)

## 2024-12-26 PROCEDURE — 99285 EMERGENCY DEPT VISIT HI MDM: CPT

## 2024-12-26 PROCEDURE — 80053 COMPREHEN METABOLIC PANEL: CPT

## 2024-12-26 PROCEDURE — 36415 COLL VENOUS BLD VENIPUNCTURE: CPT

## 2024-12-26 PROCEDURE — 6360000004 HC RX CONTRAST MEDICATION: Performed by: EMERGENCY MEDICINE

## 2024-12-26 PROCEDURE — 93005 ELECTROCARDIOGRAM TRACING: CPT

## 2024-12-26 PROCEDURE — 70498 CT ANGIOGRAPHY NECK: CPT

## 2024-12-26 PROCEDURE — 70496 CT ANGIOGRAPHY HEAD: CPT

## 2024-12-26 PROCEDURE — 70450 CT HEAD/BRAIN W/O DYE: CPT

## 2024-12-26 PROCEDURE — 6370000000 HC RX 637 (ALT 250 FOR IP)

## 2024-12-26 PROCEDURE — 85025 COMPLETE CBC W/AUTO DIFF WBC: CPT

## 2024-12-26 RX ORDER — ASPIRIN 81 MG/1
81 TABLET, CHEWABLE ORAL ONCE
Status: COMPLETED | OUTPATIENT
Start: 2024-12-26 | End: 2024-12-26

## 2024-12-26 RX ORDER — ASPIRIN 81 MG/1
81 TABLET ORAL DAILY
Qty: 90 TABLET | Refills: 0 | Status: SHIPPED | OUTPATIENT
Start: 2024-12-26

## 2024-12-26 RX ORDER — IOPAMIDOL 755 MG/ML
80 INJECTION, SOLUTION INTRAVASCULAR
Status: COMPLETED | OUTPATIENT
Start: 2024-12-26 | End: 2024-12-26

## 2024-12-26 RX ADMIN — IOPAMIDOL 80 ML: 755 INJECTION, SOLUTION INTRAVENOUS at 18:31

## 2024-12-26 RX ADMIN — ASPIRIN 81 MG: 81 TABLET, CHEWABLE ORAL at 19:44

## 2024-12-26 ASSESSMENT — PAIN SCALES - GENERAL: PAINLEVEL_OUTOF10: 0

## 2024-12-26 ASSESSMENT — PAIN - FUNCTIONAL ASSESSMENT
PAIN_FUNCTIONAL_ASSESSMENT: 0-10
PAIN_FUNCTIONAL_ASSESSMENT: NONE - DENIES PAIN

## 2024-12-26 NOTE — ED NOTES
Pt updated on plan of care at this time. Pt voices no new needs or concerns. Call light in reach.

## 2024-12-26 NOTE — ED PROVIDER NOTES
[facility] [place] EMERGENCY DEPT  EMERGENCY DEPARTMENT ENCOUNTER          Pt Name: Jamie Spangler  MRN: [id number]  Birthdate 1970  Date of evaluation: 12/26/2024  Physician: Kim Acosta MD  Supervising Attending Physician: Reji Marlow MD       CHIEF COMPLAINT       Chief Complaint   Patient presents with    Eye Problem         HISTORY OF PRESENT ILLNESS    HPI  Jamie Spangler is a 54 y.o. male who presents to the emergency department from home, by private vehicle for evaluation of eye problem    Patient referred from his retina specialist due to concerns of a possible stroke.  Patient started to complain 2 weeks ago of right upper visual field defect has been stable not getting worse not associated with any other symptoms such as weakness or numbness in any of his extremities.  Patient denies any falls or head injuries.  Denies any previous history of stroke.  He is not on any blood thinners.  He is not on aspirin or Plavix.  The patient has no other acute complaints at this time.      REVIEW OF SYSTEMS   Review of Systems      PAST MEDICAL AND SURGICAL HISTORY     Past Medical History:   Diagnosis Date    Atherosclerotic heart disease of native coronary artery with unspecified angina pectoris 1/18/2022    Bipolar 2 disorder (HCC)     previously followed with Dr. Taylor Dueñas and Augie Morel in [place]    BPH (benign prostatic hyperplasia)     Chronic diastolic congestive heart failure (HCC) 3/1/2023    COPD (chronic obstructive pulmonary disease) ([facility])     Diabetes mellitus type 2, uncontrolled     HgbA1c on 4/2/2019 was 9.1.    Diabetic peripheral neuropathy (HCC)     Diabetic polyneuropathy (HCC)     Diabetic ulcer of right foot associated with type 2 diabetes mellitus (HCC) 12/10/2015    Essential hypertension     \"never been on b/p medication that I know of\"    GERD (gastroesophageal reflux disease)     Hammer toe of left foot     Heart murmur     denies any chest pain or

## 2024-12-26 NOTE — ED TRIAGE NOTES
Pt arrives to ED with c/o eye problem. Pt reports that he was sent by his eye doctor due to having a \"stroke in the retina of right eye.\" Pt states that this problem began x2 weeks ago. Pt states the top half of his vision from right eye is blurry.

## 2024-12-26 NOTE — ED PROVIDER NOTES
[facility]  EMERGENCY MEDICINE ATTENDING ATTESTATION      Evaluation of Jamie Spangler.   Case discussed and care plan developed with resident physician.   I agree with the resident physician documentation and plan as documented by him, except if my documentation differs.   Patient seen, interviewed and examined by me.  I reviewed the medical, surgical, family and social history, medications and allergies.   I have reviewed and interpreted all available lab, radiology and ekg results available at the moment.  I have reviewed the nursing documentation.     Please see the resident physician completed note for final disposition except as documented on this attestation.   I have reviewed and interpreted all available lab, radiology and ekg results available at the moment.  Diagnosis, treatment and disposition plans were discussed and agreed upon by patient.   This transcription was electronically signed. It was dictated by use of voice recognition software and electronically transcribed. The transcription may contain errors not detected in proofreading.     I performed direct supervision and was present for the critical portion following procedures: None  Critical care time on this case: None    Electronically signed by Reji Marlow MD on 12/26/24 at 5:14 PM Reji Jimenez MD  12/26/24 0087

## 2024-12-27 NOTE — DISCHARGE INSTRUCTIONS
Follow-up with him eye doctor for further assessment and management of your symptoms.    Start taking baby aspirin daily.    Return to the emergency department immediately if there is any new or concerning symptom.

## 2024-12-28 LAB
EKG ATRIAL RATE: 94 BPM
EKG P AXIS: 32 DEGREES
EKG P-R INTERVAL: 216 MS
EKG Q-T INTERVAL: 358 MS
EKG QRS DURATION: 78 MS
EKG QTC CALCULATION (BAZETT): 447 MS
EKG R AXIS: 34 DEGREES
EKG T AXIS: -7 DEGREES
EKG VENTRICULAR RATE: 94 BPM

## 2024-12-28 PROCEDURE — 93010 ELECTROCARDIOGRAM REPORT: CPT | Performed by: INTERNAL MEDICINE

## 2025-01-23 DIAGNOSIS — E11.65 TYPE 2 DIABETES MELLITUS WITH HYPERGLYCEMIA, WITH LONG-TERM CURRENT USE OF INSULIN (HCC): ICD-10-CM

## 2025-01-23 DIAGNOSIS — Z79.4 TYPE 2 DIABETES MELLITUS WITH HYPERGLYCEMIA, WITH LONG-TERM CURRENT USE OF INSULIN (HCC): ICD-10-CM

## 2025-01-23 RX ORDER — ACYCLOVIR 400 MG/1
TABLET ORAL
Qty: 1 EACH | Refills: 0 | Status: SHIPPED | OUTPATIENT
Start: 2025-01-23

## 2025-02-11 SDOH — ECONOMIC STABILITY: FOOD INSECURITY: WITHIN THE PAST 12 MONTHS, YOU WORRIED THAT YOUR FOOD WOULD RUN OUT BEFORE YOU GOT MONEY TO BUY MORE.: SOMETIMES TRUE

## 2025-02-11 SDOH — ECONOMIC STABILITY: FOOD INSECURITY: WITHIN THE PAST 12 MONTHS, THE FOOD YOU BOUGHT JUST DIDN'T LAST AND YOU DIDN'T HAVE MONEY TO GET MORE.: SOMETIMES TRUE

## 2025-02-11 SDOH — ECONOMIC STABILITY: INCOME INSECURITY: IN THE LAST 12 MONTHS, WAS THERE A TIME WHEN YOU WERE NOT ABLE TO PAY THE MORTGAGE OR RENT ON TIME?: NO

## 2025-02-11 ASSESSMENT — PATIENT HEALTH QUESTIONNAIRE - PHQ9
1. LITTLE INTEREST OR PLEASURE IN DOING THINGS: NOT AT ALL
3. TROUBLE FALLING OR STAYING ASLEEP: NOT AT ALL
4. FEELING TIRED OR HAVING LITTLE ENERGY: NOT AT ALL
SUM OF ALL RESPONSES TO PHQ QUESTIONS 1-9: 0
5. POOR APPETITE OR OVEREATING: NOT AT ALL
SUM OF ALL RESPONSES TO PHQ9 QUESTIONS 1 & 2: 0
6. FEELING BAD ABOUT YOURSELF - OR THAT YOU ARE A FAILURE OR HAVE LET YOURSELF OR YOUR FAMILY DOWN: NOT AT ALL
9. THOUGHTS THAT YOU WOULD BE BETTER OFF DEAD, OR OF HURTING YOURSELF: NOT AT ALL
10. IF YOU CHECKED OFF ANY PROBLEMS, HOW DIFFICULT HAVE THESE PROBLEMS MADE IT FOR YOU TO DO YOUR WORK, TAKE CARE OF THINGS AT HOME, OR GET ALONG WITH OTHER PEOPLE: NOT DIFFICULT AT ALL
9. THOUGHTS THAT YOU WOULD BE BETTER OFF DEAD, OR OF HURTING YOURSELF: NOT AT ALL
SUM OF ALL RESPONSES TO PHQ QUESTIONS 1-9: 0
5. POOR APPETITE OR OVEREATING: NOT AT ALL
1. LITTLE INTEREST OR PLEASURE IN DOING THINGS: NOT AT ALL
6. FEELING BAD ABOUT YOURSELF - OR THAT YOU ARE A FAILURE OR HAVE LET YOURSELF OR YOUR FAMILY DOWN: NOT AT ALL
7. TROUBLE CONCENTRATING ON THINGS, SUCH AS READING THE NEWSPAPER OR WATCHING TELEVISION: NOT AT ALL
7. TROUBLE CONCENTRATING ON THINGS, SUCH AS READING THE NEWSPAPER OR WATCHING TELEVISION: NOT AT ALL
4. FEELING TIRED OR HAVING LITTLE ENERGY: NOT AT ALL
3. TROUBLE FALLING OR STAYING ASLEEP: NOT AT ALL
10. IF YOU CHECKED OFF ANY PROBLEMS, HOW DIFFICULT HAVE THESE PROBLEMS MADE IT FOR YOU TO DO YOUR WORK, TAKE CARE OF THINGS AT HOME, OR GET ALONG WITH OTHER PEOPLE: NOT DIFFICULT AT ALL
SUM OF ALL RESPONSES TO PHQ QUESTIONS 1-9: 0
SUM OF ALL RESPONSES TO PHQ QUESTIONS 1-9: 0
2. FEELING DOWN, DEPRESSED OR HOPELESS: NOT AT ALL
SUM OF ALL RESPONSES TO PHQ QUESTIONS 1-9: 0
2. FEELING DOWN, DEPRESSED OR HOPELESS: NOT AT ALL
8. MOVING OR SPEAKING SO SLOWLY THAT OTHER PEOPLE COULD HAVE NOTICED. OR THE OPPOSITE, BEING SO FIGETY OR RESTLESS THAT YOU HAVE BEEN MOVING AROUND A LOT MORE THAN USUAL: NOT AT ALL
8. MOVING OR SPEAKING SO SLOWLY THAT OTHER PEOPLE COULD HAVE NOTICED. OR THE OPPOSITE - BEING SO FIDGETY OR RESTLESS THAT YOU HAVE BEEN MOVING AROUND A LOT MORE THAN USUAL: NOT AT ALL

## 2025-02-12 ENCOUNTER — OFFICE VISIT (OUTPATIENT)
Dept: INTERNAL MEDICINE CLINIC | Age: 55
End: 2025-02-12

## 2025-02-12 VITALS
OXYGEN SATURATION: 96 % | RESPIRATION RATE: 18 BRPM | HEART RATE: 74 BPM | TEMPERATURE: 98 F | SYSTOLIC BLOOD PRESSURE: 146 MMHG | DIASTOLIC BLOOD PRESSURE: 70 MMHG

## 2025-02-12 DIAGNOSIS — Z79.4 TYPE 2 DIABETES MELLITUS WITH CHRONIC KIDNEY DISEASE ON CHRONIC DIALYSIS, WITH LONG-TERM CURRENT USE OF INSULIN (HCC): ICD-10-CM

## 2025-02-12 DIAGNOSIS — E78.5 DYSLIPIDEMIA: ICD-10-CM

## 2025-02-12 DIAGNOSIS — N18.6 TYPE 2 DIABETES MELLITUS WITH CHRONIC KIDNEY DISEASE ON CHRONIC DIALYSIS, WITH LONG-TERM CURRENT USE OF INSULIN (HCC): ICD-10-CM

## 2025-02-12 DIAGNOSIS — E11.22 TYPE 2 DIABETES MELLITUS WITH CHRONIC KIDNEY DISEASE ON CHRONIC DIALYSIS, WITH LONG-TERM CURRENT USE OF INSULIN (HCC): ICD-10-CM

## 2025-02-12 DIAGNOSIS — E11.65 TYPE 2 DIABETES MELLITUS WITH HYPERGLYCEMIA, WITH LONG-TERM CURRENT USE OF INSULIN (HCC): ICD-10-CM

## 2025-02-12 DIAGNOSIS — Z87.891 FORMER SMOKER: Primary | ICD-10-CM

## 2025-02-12 DIAGNOSIS — Z99.2 TYPE 2 DIABETES MELLITUS WITH CHRONIC KIDNEY DISEASE ON CHRONIC DIALYSIS, WITH LONG-TERM CURRENT USE OF INSULIN (HCC): ICD-10-CM

## 2025-02-12 DIAGNOSIS — Z79.4 TYPE 2 DIABETES MELLITUS WITH HYPERGLYCEMIA, WITH LONG-TERM CURRENT USE OF INSULIN (HCC): ICD-10-CM

## 2025-02-12 DIAGNOSIS — I20.9 ANGINA PECTORIS, UNSPECIFIED (HCC): ICD-10-CM

## 2025-02-12 LAB — HBA1C MFR BLD: 6.8 % (ref 4.3–5.7)

## 2025-02-12 RX ORDER — INSULIN GLARGINE 300 U/ML
INJECTION, SOLUTION SUBCUTANEOUS
Qty: 5 EACH | Refills: 5 | Status: SHIPPED | OUTPATIENT
Start: 2025-02-12

## 2025-02-12 RX ORDER — FUROSEMIDE 20 MG/1
20 TABLET ORAL DAILY
Qty: 90 TABLET | Refills: 1 | Status: SHIPPED | OUTPATIENT
Start: 2025-02-12

## 2025-02-12 RX ORDER — PEN NEEDLE, DIABETIC 31 GX5/16"
NEEDLE, DISPOSABLE MISCELLANEOUS
Qty: 200 EACH | Refills: 3 | Status: SHIPPED | OUTPATIENT
Start: 2025-02-12

## 2025-02-12 RX ORDER — ISOSORBIDE MONONITRATE 30 MG/1
30 TABLET, EXTENDED RELEASE ORAL DAILY
Qty: 30 TABLET | Refills: 12 | Status: SHIPPED | OUTPATIENT
Start: 2025-02-12

## 2025-02-12 RX ORDER — LISINOPRIL 5 MG/1
5 TABLET ORAL DAILY
Qty: 30 TABLET | Refills: 5 | Status: SHIPPED | OUTPATIENT
Start: 2025-02-12

## 2025-02-12 SDOH — ECONOMIC STABILITY: FOOD INSECURITY: WITHIN THE PAST 12 MONTHS, YOU WORRIED THAT YOUR FOOD WOULD RUN OUT BEFORE YOU GOT MONEY TO BUY MORE.: NEVER TRUE

## 2025-02-12 SDOH — ECONOMIC STABILITY: FOOD INSECURITY: WITHIN THE PAST 12 MONTHS, THE FOOD YOU BOUGHT JUST DIDN'T LAST AND YOU DIDN'T HAVE MONEY TO GET MORE.: NEVER TRUE

## 2025-02-12 NOTE — PROGRESS NOTES
[facility] Internal Medicine   [address]  [place] [id number]  Dept: [phone]  Dept Fax: [phone]    YOB: 1970    Hypertension  and DM-2     55 y.o. male   here for follow-up of above issues. He does have hx of DM now followed by dr. Mitchell, and does have an appt with their office next month. I haven' seen him since 2023 . He called and scheduled this appt today. Now he gets around in an electric WC, had a CVA of the right eye.   Can see on the right eye , but impaired. No recent LOC.   Now he claims , would like us to follow up with his DM.    Previous A1c was 9/24 when it was 6.6.   Today we checked an A1c its 6.8 %         Current Outpatient Medications   Medication Sig Dispense Refill    Continuous Glucose  (DEXCOM G7 ) LIANNE Check blood sugars 4x/day 1 each 0    aspirin 81 MG EC tablet Take 1 tablet by mouth daily 90 tablet 0    Dulaglutide (TRULICITY) 4.5 MG/0.5ML SOAJ 4.5 mg weekly sc 6 mL 1    Continuous Glucose Sensor (DEXCOM G7 SENSOR) MISC Check blood sugars 4x/day 9 each 0    pregabalin (LYRICA) 75 MG capsule Take 1 capsule by mouth 3 times daily for 120 days. Max Daily Amount: 225 mg 90 capsule 3    empagliflozin (JARDIANCE) 25 MG tablet TAKE 1 TABLET BY MOUTH ONCE DAILY. 30 tablet 5    metoprolol tartrate (LOPRESSOR) 25 MG tablet Take 1 tablet by mouth 2 times daily Re start tomorrow evening 2/3 180 tablet 4    atorvastatin (LIPITOR) 80 MG tablet Take 1 tablet by mouth nightly 90 tablet 1    omeprazole (PRILOSEC) 40 MG delayed release capsule TAKE ONE CAPSULE BY MOUTH IN THE MORNING BEFORE BREAKFAST 90 capsule 1    levothyroxine (SYNTHROID) 50 MCG tablet TAKE 1 TABLET BY MOUTH ONCE DAILY. 90 tablet 1    Insulin Glargine, 2 Unit Dial, (TOUJEO MAX SOLOSTAR) 300 UNIT/ML SOPN INJECT 35 UNITS IN THE MORNING, 35UNITS IN THE EVENING. EVERY 10 DAYS INCREASE BY 5 UNITS BOTH TIMES, TO GET MORNING GLUCOSE 150-200. (Patient taking differently:

## 2025-03-03 DIAGNOSIS — E11.65 TYPE 2 DIABETES MELLITUS WITH HYPERGLYCEMIA, WITH LONG-TERM CURRENT USE OF INSULIN (HCC): ICD-10-CM

## 2025-03-03 DIAGNOSIS — K21.9 GASTROESOPHAGEAL REFLUX DISEASE WITHOUT ESOPHAGITIS: ICD-10-CM

## 2025-03-03 DIAGNOSIS — Z79.4 TYPE 2 DIABETES MELLITUS WITH HYPERGLYCEMIA, WITH LONG-TERM CURRENT USE OF INSULIN (HCC): ICD-10-CM

## 2025-03-03 NOTE — TELEPHONE ENCOUNTER
Recent Visits  Date Type Provider Dept   02/12/25 Office Visit Phuc Ortega MD [facility]   11/26/24 Office Visit Orellana, JanMARILYN - CNP [facility]   07/24/24 Office Visit Orellana, Jan, APRN - CNP [facility]   06/20/24 Office Visit Vickie Velasquez, [facility]   04/03/24 Office Visit EstebanJuanMARILYN - CNP [facility]   01/02/24 Office Visit Orellana, Jan, APRN - CNP [facility]   09/26/23 Office Visit Orellana, Jan, APRN - CNP [facility]   Showing recent visits within past 540 days with a meds authorizing provider and meeting all other requirements  Future Appointments  Date Type Provider Dept   06/24/25 Appointment [facility]   Showing future appointments within next 150 days with a meds authorizing provider and meeting all other requirements

## 2025-03-04 RX ORDER — ACYCLOVIR 400 MG/1
TABLET ORAL
Qty: 1 EACH | Refills: 0 | Status: SHIPPED | OUTPATIENT
Start: 2025-03-04

## 2025-03-12 RX ORDER — OMEPRAZOLE 40 MG/1
CAPSULE, DELAYED RELEASE ORAL
Qty: 90 CAPSULE | Refills: 1 | Status: ON HOLD | OUTPATIENT
Start: 2025-03-12

## 2025-03-12 NOTE — TELEPHONE ENCOUNTER
Patient of Dr. Ortega    Last ordered 9/23/24, disp 90, refill 1    Last visit 2/12/25  Next visit 6/24

## 2025-03-14 ENCOUNTER — HOSPITAL ENCOUNTER (INPATIENT)
Age: 55
LOS: 4 days | Discharge: HOME | DRG: 872 | End: 2025-03-18
Attending: EMERGENCY MEDICINE
Payer: MEDICARE

## 2025-03-14 ENCOUNTER — APPOINTMENT (OUTPATIENT)
Dept: GENERAL RADIOLOGY | Age: 55
DRG: 872 | End: 2025-03-14
Payer: MEDICARE

## 2025-03-14 DIAGNOSIS — I50.32 CHRONIC DIASTOLIC CONGESTIVE HEART FAILURE (HCC): ICD-10-CM

## 2025-03-14 DIAGNOSIS — G62.9 NEUROPATHY: ICD-10-CM

## 2025-03-14 DIAGNOSIS — R07.9 CHEST PAIN, UNSPECIFIED TYPE: Primary | ICD-10-CM

## 2025-03-14 DIAGNOSIS — L03.116 CELLULITIS OF LEFT LOWER EXTREMITY: ICD-10-CM

## 2025-03-14 DIAGNOSIS — A41.9 SEPTICEMIA (HCC): ICD-10-CM

## 2025-03-14 PROBLEM — R42 DIZZINESS: Status: ACTIVE | Noted: 2025-03-14

## 2025-03-14 LAB
ALBUMIN SERPL BCG-MCNC: 3.9 G/DL (ref 3.4–4.9)
ALP SERPL-CCNC: 76 U/L (ref 40–129)
ALT SERPL W/O P-5'-P-CCNC: 18 U/L (ref 10–50)
ANION GAP SERPL CALC-SCNC: 14 MEQ/L (ref 8–16)
AST SERPL-CCNC: 33 U/L (ref 10–50)
BASOPHILS ABSOLUTE: 0.1 THOU/MM3 (ref 0–0.1)
BASOPHILS NFR BLD AUTO: 0.7 %
BILIRUB SERPL-MCNC: 0.4 MG/DL (ref 0.3–1.2)
BUN SERPL-MCNC: 19 MG/DL (ref 8–23)
CALCIUM SERPL-MCNC: 9.2 MG/DL (ref 8.6–10)
CHLORIDE SERPL-SCNC: 100 MEQ/L (ref 98–111)
CO2 SERPL-SCNC: 22 MEQ/L (ref 22–29)
CREAT SERPL-MCNC: 0.9 MG/DL (ref 0.7–1.2)
DEPRECATED RDW RBC AUTO: 44.9 FL (ref 35–45)
EKG ATRIAL RATE: 101 BPM
EKG P AXIS: 61 DEGREES
EKG P-R INTERVAL: 232 MS
EKG Q-T INTERVAL: 358 MS
EKG QRS DURATION: 78 MS
EKG QTC CALCULATION (BAZETT): 464 MS
EKG R AXIS: 8 DEGREES
EKG T AXIS: 41 DEGREES
EKG VENTRICULAR RATE: 101 BPM
EOSINOPHIL NFR BLD AUTO: 1.4 %
EOSINOPHILS ABSOLUTE: 0.2 THOU/MM3 (ref 0–0.4)
ERYTHROCYTE [DISTWIDTH] IN BLOOD BY AUTOMATED COUNT: 13.3 % (ref 11.5–14.5)
GFR SERPL CREATININE-BSD FRML MDRD: > 90 ML/MIN/1.73M2
GLUCOSE SERPL-MCNC: 176 MG/DL (ref 74–109)
HCT VFR BLD AUTO: 49.8 % (ref 42–52)
HGB BLD-MCNC: 17.1 GM/DL (ref 14–18)
IMM GRANULOCYTES # BLD AUTO: 0.05 THOU/MM3 (ref 0–0.07)
IMM GRANULOCYTES NFR BLD AUTO: 0.4 %
LACTATE SERPL-SCNC: 2.3 MMOL/L (ref 0.5–2)
LACTATE SERPL-SCNC: 3.5 MMOL/L (ref 0.5–2)
LYMPHOCYTES ABSOLUTE: 2 THOU/MM3 (ref 1–4.8)
LYMPHOCYTES NFR BLD AUTO: 16.2 %
MAGNESIUM SERPL-MCNC: 2.3 MG/DL (ref 1.6–2.6)
MCH RBC QN AUTO: 31.5 PG (ref 26–33)
MCHC RBC AUTO-ENTMCNC: 34.3 GM/DL (ref 32.2–35.5)
MCV RBC AUTO: 91.7 FL (ref 80–94)
MONOCYTES ABSOLUTE: 1 THOU/MM3 (ref 0.4–1.3)
MONOCYTES NFR BLD AUTO: 8 %
NEUTROPHILS ABSOLUTE: 8.9 THOU/MM3 (ref 1.8–7.7)
NEUTROPHILS NFR BLD AUTO: 73.3 %
NRBC BLD AUTO-RTO: 0 /100 WBC
NT-PROBNP SERPL IA-MCNC: < 36 PG/ML (ref 0–124)
OSMOLALITY SERPL CALC.SUM OF ELEC: 278.5 MOSMOL/KG (ref 275–300)
PLATELET # BLD AUTO: 257 THOU/MM3 (ref 130–400)
PMV BLD AUTO: 9.7 FL (ref 9.4–12.4)
POTASSIUM SERPL-SCNC: 4.2 MEQ/L (ref 3.5–5.2)
PROT SERPL-MCNC: 7.5 G/DL (ref 6.4–8.3)
RBC # BLD AUTO: 5.43 MILL/MM3 (ref 4.7–6.1)
SODIUM SERPL-SCNC: 136 MEQ/L (ref 135–145)
TROPONIN, HIGH SENSITIVITY: 29 NG/L (ref 0–12)
TROPONIN, HIGH SENSITIVITY: 31 NG/L (ref 0–12)
WBC # BLD AUTO: 12.2 THOU/MM3 (ref 4.8–10.8)

## 2025-03-14 PROCEDURE — 2580000003 HC RX 258

## 2025-03-14 PROCEDURE — 84484 ASSAY OF TROPONIN QUANT: CPT

## 2025-03-14 PROCEDURE — 2500000003 HC RX 250 WO HCPCS

## 2025-03-14 PROCEDURE — 71045 X-RAY EXAM CHEST 1 VIEW: CPT

## 2025-03-14 PROCEDURE — 36415 COLL VENOUS BLD VENIPUNCTURE: CPT

## 2025-03-14 PROCEDURE — 87040 BLOOD CULTURE FOR BACTERIA: CPT

## 2025-03-14 PROCEDURE — 83880 ASSAY OF NATRIURETIC PEPTIDE: CPT

## 2025-03-14 PROCEDURE — 1200000003 HC TELEMETRY R&B

## 2025-03-14 PROCEDURE — 6370000000 HC RX 637 (ALT 250 FOR IP)

## 2025-03-14 PROCEDURE — 96374 THER/PROPH/DIAG INJ IV PUSH: CPT

## 2025-03-14 PROCEDURE — 6360000002 HC RX W HCPCS

## 2025-03-14 PROCEDURE — 83735 ASSAY OF MAGNESIUM: CPT

## 2025-03-14 PROCEDURE — 99223 1ST HOSP IP/OBS HIGH 75: CPT | Performed by: INTERNAL MEDICINE

## 2025-03-14 PROCEDURE — 96375 TX/PRO/DX INJ NEW DRUG ADDON: CPT

## 2025-03-14 PROCEDURE — 93005 ELECTROCARDIOGRAM TRACING: CPT

## 2025-03-14 PROCEDURE — 99285 EMERGENCY DEPT VISIT HI MDM: CPT

## 2025-03-14 PROCEDURE — 80053 COMPREHEN METABOLIC PANEL: CPT

## 2025-03-14 PROCEDURE — 73630 X-RAY EXAM OF FOOT: CPT

## 2025-03-14 PROCEDURE — 85025 COMPLETE CBC W/AUTO DIFF WBC: CPT

## 2025-03-14 PROCEDURE — 83605 ASSAY OF LACTIC ACID: CPT

## 2025-03-14 RX ORDER — POLYETHYLENE GLYCOL 3350 17 G/17G
17 POWDER, FOR SOLUTION ORAL DAILY PRN
Status: DISCONTINUED | OUTPATIENT
Start: 2025-03-14 | End: 2025-03-18 | Stop reason: HOSPADM

## 2025-03-14 RX ORDER — INSULIN LISPRO 100 [IU]/ML
35 INJECTION, SOLUTION INTRAVENOUS; SUBCUTANEOUS
Status: DISCONTINUED | OUTPATIENT
Start: 2025-03-15 | End: 2025-03-18 | Stop reason: HOSPADM

## 2025-03-14 RX ORDER — MORPHINE SULFATE 4 MG/ML
4 INJECTION, SOLUTION INTRAMUSCULAR; INTRAVENOUS
Refills: 0 | Status: DISCONTINUED | OUTPATIENT
Start: 2025-03-14 | End: 2025-03-18 | Stop reason: HOSPADM

## 2025-03-14 RX ORDER — SODIUM CHLORIDE 0.9 % (FLUSH) 0.9 %
5-40 SYRINGE (ML) INJECTION PRN
Status: DISCONTINUED | OUTPATIENT
Start: 2025-03-14 | End: 2025-03-18 | Stop reason: HOSPADM

## 2025-03-14 RX ORDER — SERTRALINE HYDROCHLORIDE 100 MG/1
100 TABLET, FILM COATED ORAL DAILY
Status: DISCONTINUED | OUTPATIENT
Start: 2025-03-15 | End: 2025-03-18 | Stop reason: HOSPADM

## 2025-03-14 RX ORDER — DEXTROSE MONOHYDRATE 100 MG/ML
INJECTION, SOLUTION INTRAVENOUS CONTINUOUS PRN
Status: DISCONTINUED | OUTPATIENT
Start: 2025-03-14 | End: 2025-03-18 | Stop reason: HOSPADM

## 2025-03-14 RX ORDER — MAGNESIUM SULFATE IN WATER 40 MG/ML
2000 INJECTION, SOLUTION INTRAVENOUS PRN
Status: DISCONTINUED | OUTPATIENT
Start: 2025-03-14 | End: 2025-03-18 | Stop reason: HOSPADM

## 2025-03-14 RX ORDER — ONDANSETRON 4 MG/1
4 TABLET, ORALLY DISINTEGRATING ORAL EVERY 8 HOURS PRN
Status: DISCONTINUED | OUTPATIENT
Start: 2025-03-14 | End: 2025-03-18 | Stop reason: HOSPADM

## 2025-03-14 RX ORDER — MORPHINE SULFATE 2 MG/ML
2 INJECTION, SOLUTION INTRAMUSCULAR; INTRAVENOUS
Refills: 0 | Status: DISCONTINUED | OUTPATIENT
Start: 2025-03-14 | End: 2025-03-18 | Stop reason: HOSPADM

## 2025-03-14 RX ORDER — POTASSIUM CHLORIDE 7.45 MG/ML
10 INJECTION INTRAVENOUS PRN
Status: DISCONTINUED | OUTPATIENT
Start: 2025-03-14 | End: 2025-03-18 | Stop reason: HOSPADM

## 2025-03-14 RX ORDER — ASCORBIC ACID 500 MG
500 TABLET ORAL DAILY
Status: DISCONTINUED | OUTPATIENT
Start: 2025-03-15 | End: 2025-03-18 | Stop reason: HOSPADM

## 2025-03-14 RX ORDER — ATORVASTATIN CALCIUM 80 MG/1
80 TABLET, FILM COATED ORAL NIGHTLY
Status: DISCONTINUED | OUTPATIENT
Start: 2025-03-15 | End: 2025-03-18 | Stop reason: HOSPADM

## 2025-03-14 RX ORDER — CHLORAL HYDRATE 500 MG
1 CAPSULE ORAL DAILY
Status: DISCONTINUED | OUTPATIENT
Start: 2025-03-15 | End: 2025-03-18 | Stop reason: HOSPADM

## 2025-03-14 RX ORDER — NITROGLYCERIN 0.4 MG/1
0.4 TABLET SUBLINGUAL EVERY 5 MIN PRN
Status: DISCONTINUED | OUTPATIENT
Start: 2025-03-14 | End: 2025-03-18 | Stop reason: HOSPADM

## 2025-03-14 RX ORDER — FUROSEMIDE 20 MG/1
20 TABLET ORAL DAILY
Status: DISCONTINUED | OUTPATIENT
Start: 2025-03-15 | End: 2025-03-18 | Stop reason: HOSPADM

## 2025-03-14 RX ORDER — VITAMIN B COMPLEX
1000 TABLET ORAL DAILY
Status: DISCONTINUED | OUTPATIENT
Start: 2025-03-15 | End: 2025-03-18 | Stop reason: HOSPADM

## 2025-03-14 RX ORDER — GLUCAGON 1 MG/ML
1 KIT INJECTION PRN
Status: DISCONTINUED | OUTPATIENT
Start: 2025-03-14 | End: 2025-03-18 | Stop reason: HOSPADM

## 2025-03-14 RX ORDER — 0.9 % SODIUM CHLORIDE 0.9 %
30 INTRAVENOUS SOLUTION INTRAVENOUS ONCE
Status: COMPLETED | OUTPATIENT
Start: 2025-03-14 | End: 2025-03-14

## 2025-03-14 RX ORDER — LISINOPRIL 5 MG/1
5 TABLET ORAL DAILY
Status: DISCONTINUED | OUTPATIENT
Start: 2025-03-15 | End: 2025-03-18 | Stop reason: HOSPADM

## 2025-03-14 RX ORDER — SODIUM CHLORIDE 0.9 % (FLUSH) 0.9 %
5-40 SYRINGE (ML) INJECTION EVERY 12 HOURS SCHEDULED
Status: DISCONTINUED | OUTPATIENT
Start: 2025-03-14 | End: 2025-03-18 | Stop reason: HOSPADM

## 2025-03-14 RX ORDER — METOPROLOL TARTRATE 25 MG/1
25 TABLET, FILM COATED ORAL 2 TIMES DAILY
Status: DISCONTINUED | OUTPATIENT
Start: 2025-03-15 | End: 2025-03-18 | Stop reason: HOSPADM

## 2025-03-14 RX ORDER — ASPIRIN 81 MG/1
81 TABLET ORAL DAILY
Status: DISCONTINUED | OUTPATIENT
Start: 2025-03-15 | End: 2025-03-18 | Stop reason: HOSPADM

## 2025-03-14 RX ORDER — ARIPIPRAZOLE 5 MG/1
5 TABLET ORAL DAILY
Status: DISCONTINUED | OUTPATIENT
Start: 2025-03-15 | End: 2025-03-18 | Stop reason: HOSPADM

## 2025-03-14 RX ORDER — INSULIN GLARGINE 100 [IU]/ML
30 INJECTION, SOLUTION SUBCUTANEOUS 2 TIMES DAILY
Status: DISCONTINUED | OUTPATIENT
Start: 2025-03-14 | End: 2025-03-18 | Stop reason: HOSPADM

## 2025-03-14 RX ORDER — PANTOPRAZOLE SODIUM 40 MG/1
40 TABLET, DELAYED RELEASE ORAL
Status: DISCONTINUED | OUTPATIENT
Start: 2025-03-15 | End: 2025-03-18 | Stop reason: HOSPADM

## 2025-03-14 RX ORDER — INSULIN LISPRO 100 [IU]/ML
0-8 INJECTION, SOLUTION INTRAVENOUS; SUBCUTANEOUS
Status: DISCONTINUED | OUTPATIENT
Start: 2025-03-14 | End: 2025-03-18 | Stop reason: HOSPADM

## 2025-03-14 RX ORDER — ACETAMINOPHEN 650 MG/1
650 SUPPOSITORY RECTAL EVERY 6 HOURS PRN
Status: DISCONTINUED | OUTPATIENT
Start: 2025-03-14 | End: 2025-03-18 | Stop reason: HOSPADM

## 2025-03-14 RX ORDER — SODIUM CHLORIDE 9 MG/ML
INJECTION, SOLUTION INTRAVENOUS PRN
Status: DISCONTINUED | OUTPATIENT
Start: 2025-03-14 | End: 2025-03-18 | Stop reason: HOSPADM

## 2025-03-14 RX ORDER — ISOSORBIDE MONONITRATE 30 MG/1
30 TABLET, EXTENDED RELEASE ORAL DAILY
Status: DISCONTINUED | OUTPATIENT
Start: 2025-03-15 | End: 2025-03-18 | Stop reason: HOSPADM

## 2025-03-14 RX ORDER — LEVOTHYROXINE SODIUM 50 UG/1
50 TABLET ORAL DAILY
Status: DISCONTINUED | OUTPATIENT
Start: 2025-03-15 | End: 2025-03-18 | Stop reason: HOSPADM

## 2025-03-14 RX ORDER — POTASSIUM CHLORIDE 1500 MG/1
40 TABLET, EXTENDED RELEASE ORAL PRN
Status: DISCONTINUED | OUTPATIENT
Start: 2025-03-14 | End: 2025-03-18 | Stop reason: HOSPADM

## 2025-03-14 RX ORDER — ACETAMINOPHEN 325 MG/1
650 TABLET ORAL EVERY 6 HOURS PRN
Status: DISCONTINUED | OUTPATIENT
Start: 2025-03-14 | End: 2025-03-18 | Stop reason: HOSPADM

## 2025-03-14 RX ORDER — ENOXAPARIN SODIUM 100 MG/ML
30 INJECTION SUBCUTANEOUS 2 TIMES DAILY
Status: DISCONTINUED | OUTPATIENT
Start: 2025-03-14 | End: 2025-03-18 | Stop reason: HOSPADM

## 2025-03-14 RX ORDER — ONDANSETRON 2 MG/ML
4 INJECTION INTRAMUSCULAR; INTRAVENOUS EVERY 6 HOURS PRN
Status: DISCONTINUED | OUTPATIENT
Start: 2025-03-14 | End: 2025-03-18 | Stop reason: HOSPADM

## 2025-03-14 RX ORDER — PREGABALIN 75 MG/1
75 CAPSULE ORAL 3 TIMES DAILY
Status: DISCONTINUED | OUTPATIENT
Start: 2025-03-15 | End: 2025-03-18 | Stop reason: HOSPADM

## 2025-03-14 RX ORDER — DEXTROSE MONOHYDRATE 100 MG/ML
INJECTION, SOLUTION INTRAVENOUS CONTINUOUS PRN
Status: DISCONTINUED | OUTPATIENT
Start: 2025-03-14 | End: 2025-03-14 | Stop reason: SDUPTHER

## 2025-03-14 RX ORDER — ASPIRIN 81 MG/1
324 TABLET, CHEWABLE ORAL ONCE
Status: COMPLETED | OUTPATIENT
Start: 2025-03-14 | End: 2025-03-14

## 2025-03-14 RX ADMIN — SODIUM CHLORIDE 1914 ML: 9 INJECTION, SOLUTION INTRAVENOUS at 19:38

## 2025-03-14 RX ADMIN — DOXYCYCLINE 100 MG: 100 INJECTION, POWDER, LYOPHILIZED, FOR SOLUTION INTRAVENOUS at 19:36

## 2025-03-14 RX ADMIN — WATER 1000 MG: 1 INJECTION INTRAMUSCULAR; INTRAVENOUS; SUBCUTANEOUS at 19:27

## 2025-03-14 RX ADMIN — ENOXAPARIN SODIUM 30 MG: 100 INJECTION SUBCUTANEOUS at 22:56

## 2025-03-14 RX ADMIN — ASPIRIN 324 MG: 81 TABLET, CHEWABLE ORAL at 19:25

## 2025-03-14 ASSESSMENT — HEART SCORE: ECG: NON-SPECIFC REPOLARIZATION DISTURBANCE/LBTB/PM

## 2025-03-14 ASSESSMENT — PAIN DESCRIPTION - LOCATION
LOCATION: CHEST
LOCATION: FOOT

## 2025-03-14 ASSESSMENT — PAIN SCALES - GENERAL
PAINLEVEL_OUTOF10: 7
PAINLEVEL_OUTOF10: 5
PAINLEVEL_OUTOF10: 5

## 2025-03-14 ASSESSMENT — PAIN DESCRIPTION - ORIENTATION
ORIENTATION: LEFT
ORIENTATION: MID

## 2025-03-14 ASSESSMENT — PAIN - FUNCTIONAL ASSESSMENT
PAIN_FUNCTIONAL_ASSESSMENT: 0-10
PAIN_FUNCTIONAL_ASSESSMENT: 0-10

## 2025-03-14 NOTE — ED NOTES
Patient declines COVID/Influenza swab at this time. Patient is resting in bed with easy and unlabored respirations. Call light in reach. Side rails up x2. Patient denies further complaints or concerns. Will monitor.

## 2025-03-14 NOTE — ED PROVIDER NOTES
time range)     Or   dextrose bolus 10% 250 mL (has no administration in time range)   insulin lispro (HUMALOG,ADMELOG) injection vial 35 Units (35 Units SubCUTAneous Not Given 3/15/25 1159)   cefTRIAXone (ROCEPHIN) 1,000 mg in sterile water 10 mL IV syringe (has no administration in time range)   doxycycline (VIBRAMYCIN) 100 mg in sodium chloride 0.9 % 100 mL IVPB (0 mg IntraVENous Stopped 3/15/25 1143)   cefTRIAXone (ROCEPHIN) 1,000 mg in sterile water 10 mL IV syringe (1,000 mg IntraVENous Given 3/14/25 1927)   doxycycline (VIBRAMYCIN) 100 mg in sodium chloride 0.9 % 100 mL IVPB (0 mg IntraVENous Stopped 3/14/25 2109)   aspirin chewable tablet 324 mg (324 mg Oral Given 3/14/25 1925)   sodium chloride 0.9 % bolus 1,914 mL (0 mLs IntraVENous Stopped 3/14/25 2213)     FINAL IMPRESSION AND DISPOSITION      1. Chest pain, unspecified type    2. Cellulitis of left lower extremity    3. Septicemia (HCC)    4. Chronic diastolic congestive heart failure (HCC)    5. Neuropathy        DISPOSITION Admitted 03/14/2025 08:03:14 PM     PATIENT REFERRED TO:  No follow-up provider specified.  DISCHARGE MEDICATIONS:  Current Discharge Medication List          (Please note that portions of this note were completed with a voice recognition program.  Efforts were made to edit the dictations but occasionally words aremis-transcribed.)    MD Alyssa Herring, MD Dany  03/15/25 7781    
  CULTURE, BLOOD 1   MAGNESIUM   BRAIN NATRIURETIC PEPTIDE   ANION GAP   GLOMERULAR FILTRATION RATE, ESTIMATED   OSMOLALITY   SPECIMEN REJECTION   LACTIC ACID     All laboratory results are individually reviewed and interpreted by me in the clinical context of this patient.  See ED course below for results interpretation if applicable.  (Any cultures that may have been sent were not resulted at the time of this patient ED visit)      Radiologic studies results available at the moment of this note (None if blank):  XR FOOT LEFT (MIN 3 VIEWS)   Final Result   1. No radiographic evidence for acute osteomyelitis.   2. Additional findings as above.      This document has been electronically signed by: Liliane Momin DO on    03/14/2025 07:05 PM      XR CHEST PORTABLE   Final Result   1. No acute findings.      This document has been electronically signed by: Liliane Momin DO on    03/14/2025 05:59 PM        See ED course below for my interpretation if applicable.  All radiology images independently reviewed by me in the clinical context of this patient, in addition to interpretation provided by the radiologist.      EKG interpretation (none if blank):  EKG shows sinus tachycardia first-degree AV block rate of 101 bpm, OH interval 232, QRS 78, QTc 464, no STEMI.    All EKG results are individually reviewed and interpreted by me in the clinical context of this patient.  All EKGs are also interpreted by our Cardiology department, final interpretation may not be available as of the writing of this note.      MEDICAL DECISION MAKING   Initial Assessment Summary:   Patient 55-year-old male presented to ED for chest pain, dizziness, left foot infection  Please see ED course section below for continuation and resolution of this initial assessment if applicable.      Comorbid conditions pertinent to this ED encounter:  Diabetic, bipolar 2, HTN, diabetic polyneuropathy, above-the-knee amputation, CAD s/p angioplasty with

## 2025-03-14 NOTE — ED NOTES
Patient to the ED with complaint of chest pain and dizziness. Patient states that his symptoms onset today. He denies modifying factors. Patient notes that he woke up this morning with his left foot \"bleeding.' He notes chronic wound to this. Patient is resting in bed with easy and unlabored respirations. Call light in reach. Side rails up x2. Patient denies further complaints or concerns. Will monitor.

## 2025-03-15 PROBLEM — R07.9 CHEST PAIN: Status: ACTIVE | Noted: 2025-03-15

## 2025-03-15 PROBLEM — Z91.148 NON COMPLIANCE W MEDICATION REGIMEN: Status: ACTIVE | Noted: 2024-09-05

## 2025-03-15 LAB
ANION GAP SERPL CALC-SCNC: 5 MEQ/L (ref 8–16)
BUN SERPL-MCNC: 18 MG/DL (ref 8–23)
CALCIUM SERPL-MCNC: 9 MG/DL (ref 8.6–10)
CHLORIDE SERPL-SCNC: 106 MEQ/L (ref 98–111)
CO2 SERPL-SCNC: 25 MEQ/L (ref 22–29)
CREAT SERPL-MCNC: 0.9 MG/DL (ref 0.7–1.2)
DEPRECATED RDW RBC AUTO: 45.8 FL (ref 35–45)
EKG ATRIAL RATE: 86 BPM
EKG P AXIS: 17 DEGREES
EKG P-R INTERVAL: 230 MS
EKG Q-T INTERVAL: 364 MS
EKG QRS DURATION: 78 MS
EKG QTC CALCULATION (BAZETT): 435 MS
EKG R AXIS: -4 DEGREES
EKG T AXIS: 42 DEGREES
EKG VENTRICULAR RATE: 86 BPM
ERYTHROCYTE [DISTWIDTH] IN BLOOD BY AUTOMATED COUNT: 13.5 % (ref 11.5–14.5)
GFR SERPL CREATININE-BSD FRML MDRD: > 90 ML/MIN/1.73M2
GLUCOSE BLD STRIP.AUTO-MCNC: 126 MG/DL (ref 70–108)
GLUCOSE BLD STRIP.AUTO-MCNC: 130 MG/DL (ref 70–108)
GLUCOSE BLD STRIP.AUTO-MCNC: 151 MG/DL (ref 70–108)
GLUCOSE BLD STRIP.AUTO-MCNC: 154 MG/DL (ref 70–108)
GLUCOSE BLD STRIP.AUTO-MCNC: 168 MG/DL (ref 70–108)
GLUCOSE SERPL-MCNC: 127 MG/DL (ref 74–109)
HCT VFR BLD AUTO: 43.6 % (ref 42–52)
HGB BLD-MCNC: 14.9 GM/DL (ref 14–18)
LACTATE SERPL-SCNC: 1.9 MMOL/L (ref 0.5–2)
LACTATE SERPL-SCNC: 2.5 MMOL/L (ref 0.5–2)
MAGNESIUM SERPL-MCNC: 2.2 MG/DL (ref 1.6–2.6)
MCH RBC QN AUTO: 31.6 PG (ref 26–33)
MCHC RBC AUTO-ENTMCNC: 34.2 GM/DL (ref 32.2–35.5)
MCV RBC AUTO: 92.4 FL (ref 80–94)
OSMOLALITY SERPL CALC.SUM OF ELEC: 275.4 MOSMOL/KG (ref 275–300)
PLATELET # BLD AUTO: 239 THOU/MM3 (ref 130–400)
PMV BLD AUTO: 10 FL (ref 9.4–12.4)
POTASSIUM SERPL-SCNC: 4.1 MEQ/L (ref 3.5–5.2)
RBC # BLD AUTO: 4.72 MILL/MM3 (ref 4.7–6.1)
SODIUM SERPL-SCNC: 136 MEQ/L (ref 135–145)
WBC # BLD AUTO: 10.3 THOU/MM3 (ref 4.8–10.8)

## 2025-03-15 PROCEDURE — 2500000003 HC RX 250 WO HCPCS

## 2025-03-15 PROCEDURE — 36415 COLL VENOUS BLD VENIPUNCTURE: CPT

## 2025-03-15 PROCEDURE — 6370000000 HC RX 637 (ALT 250 FOR IP)

## 2025-03-15 PROCEDURE — 82948 REAGENT STRIP/BLOOD GLUCOSE: CPT

## 2025-03-15 PROCEDURE — 93005 ELECTROCARDIOGRAM TRACING: CPT

## 2025-03-15 PROCEDURE — 85027 COMPLETE CBC AUTOMATED: CPT

## 2025-03-15 PROCEDURE — 2500000003 HC RX 250 WO HCPCS: Performed by: STUDENT IN AN ORGANIZED HEALTH CARE EDUCATION/TRAINING PROGRAM

## 2025-03-15 PROCEDURE — 6360000002 HC RX W HCPCS

## 2025-03-15 PROCEDURE — 83605 ASSAY OF LACTIC ACID: CPT

## 2025-03-15 PROCEDURE — 99233 SBSQ HOSP IP/OBS HIGH 50: CPT | Performed by: INTERNAL MEDICINE

## 2025-03-15 PROCEDURE — 2580000003 HC RX 258

## 2025-03-15 PROCEDURE — 6360000002 HC RX W HCPCS: Performed by: STUDENT IN AN ORGANIZED HEALTH CARE EDUCATION/TRAINING PROGRAM

## 2025-03-15 PROCEDURE — 80048 BASIC METABOLIC PNL TOTAL CA: CPT

## 2025-03-15 PROCEDURE — 83735 ASSAY OF MAGNESIUM: CPT

## 2025-03-15 PROCEDURE — 1200000003 HC TELEMETRY R&B

## 2025-03-15 RX ADMIN — Medication 1000 MG: at 08:34

## 2025-03-15 RX ADMIN — ASPIRIN 81 MG: 81 TABLET, COATED ORAL at 08:34

## 2025-03-15 RX ADMIN — CEFTRIAXONE SODIUM 2000 MG: 2 INJECTION, POWDER, FOR SOLUTION INTRAMUSCULAR; INTRAVENOUS at 17:52

## 2025-03-15 RX ADMIN — EMPAGLIFLOZIN 25 MG: 25 TABLET, FILM COATED ORAL at 08:34

## 2025-03-15 RX ADMIN — NITROGLYCERIN 0.4 MG: 0.4 TABLET, ORALLY DISINTEGRATING SUBLINGUAL at 02:08

## 2025-03-15 RX ADMIN — PREGABALIN 75 MG: 75 CAPSULE ORAL at 08:35

## 2025-03-15 RX ADMIN — PREGABALIN 75 MG: 75 CAPSULE ORAL at 13:50

## 2025-03-15 RX ADMIN — DOXYCYCLINE 100 MG: 100 INJECTION, POWDER, LYOPHILIZED, FOR SOLUTION INTRAVENOUS at 20:23

## 2025-03-15 RX ADMIN — PANTOPRAZOLE SODIUM 40 MG: 40 TABLET, DELAYED RELEASE ORAL at 05:55

## 2025-03-15 RX ADMIN — Medication 1000 UNITS: at 08:34

## 2025-03-15 RX ADMIN — INSULIN LISPRO 35 UNITS: 100 INJECTION, SOLUTION INTRAVENOUS; SUBCUTANEOUS at 08:35

## 2025-03-15 RX ADMIN — MORPHINE SULFATE 4 MG: 4 INJECTION, SOLUTION INTRAMUSCULAR; INTRAVENOUS at 00:31

## 2025-03-15 RX ADMIN — ARIPIPRAZOLE 5 MG: 5 TABLET ORAL at 08:34

## 2025-03-15 RX ADMIN — PREGABALIN 75 MG: 75 CAPSULE ORAL at 20:12

## 2025-03-15 RX ADMIN — INSULIN GLARGINE 30 UNITS: 100 INJECTION, SOLUTION SUBCUTANEOUS at 08:35

## 2025-03-15 RX ADMIN — MORPHINE SULFATE 4 MG: 4 INJECTION, SOLUTION INTRAMUSCULAR; INTRAVENOUS at 05:55

## 2025-03-15 RX ADMIN — ISOSORBIDE MONONITRATE 30 MG: 30 TABLET, EXTENDED RELEASE ORAL at 08:34

## 2025-03-15 RX ADMIN — OXYCODONE HYDROCHLORIDE AND ACETAMINOPHEN 500 MG: 500 TABLET ORAL at 08:34

## 2025-03-15 RX ADMIN — ENOXAPARIN SODIUM 30 MG: 100 INJECTION SUBCUTANEOUS at 08:35

## 2025-03-15 RX ADMIN — LISINOPRIL 5 MG: 5 TABLET ORAL at 08:34

## 2025-03-15 RX ADMIN — DOXYCYCLINE 100 MG: 100 INJECTION, POWDER, LYOPHILIZED, FOR SOLUTION INTRAVENOUS at 10:06

## 2025-03-15 RX ADMIN — LEVOTHYROXINE SODIUM 50 MCG: 0.05 TABLET ORAL at 05:55

## 2025-03-15 RX ADMIN — ENOXAPARIN SODIUM 30 MG: 100 INJECTION SUBCUTANEOUS at 20:12

## 2025-03-15 RX ADMIN — SODIUM CHLORIDE, PRESERVATIVE FREE 10 ML: 5 INJECTION INTRAVENOUS at 08:36

## 2025-03-15 RX ADMIN — FUROSEMIDE 20 MG: 20 TABLET ORAL at 08:34

## 2025-03-15 RX ADMIN — SODIUM CHLORIDE, PRESERVATIVE FREE 10 ML: 5 INJECTION INTRAVENOUS at 20:23

## 2025-03-15 RX ADMIN — SERTRALINE 100 MG: 100 TABLET, FILM COATED ORAL at 08:34

## 2025-03-15 RX ADMIN — METOPROLOL TARTRATE 25 MG: 25 TABLET, FILM COATED ORAL at 08:34

## 2025-03-15 RX ADMIN — ATORVASTATIN CALCIUM 80 MG: 80 TABLET, FILM COATED ORAL at 20:12

## 2025-03-15 ASSESSMENT — PAIN SCALES - GENERAL
PAINLEVEL_OUTOF10: 10
PAINLEVEL_OUTOF10: 3
PAINLEVEL_OUTOF10: 3
PAINLEVEL_OUTOF10: 0
PAINLEVEL_OUTOF10: 0
PAINLEVEL_OUTOF10: 5
PAINLEVEL_OUTOF10: 5

## 2025-03-15 ASSESSMENT — PAIN DESCRIPTION - DESCRIPTORS: DESCRIPTORS: TIGHTNESS

## 2025-03-15 ASSESSMENT — PAIN DESCRIPTION - LOCATION: LOCATION: CHEST

## 2025-03-15 ASSESSMENT — PAIN - FUNCTIONAL ASSESSMENT: PAIN_FUNCTIONAL_ASSESSMENT: ACTIVITIES ARE NOT PREVENTED

## 2025-03-15 ASSESSMENT — PAIN DESCRIPTION - ORIENTATION: ORIENTATION: MID

## 2025-03-15 NOTE — PROCEDURES
PROCEDURE NOTE  Date: 3/15/2025   Name: Jamie Spangler  YOB: 1970    Procedures        EKG was completed and handed to RN

## 2025-03-15 NOTE — CONSULTS
hours. No growth 48 hours. No growth at 5 days           Problem list of patient     Patient Active Problem List   Diagnosis    Gait disturbance    Severe bipolar II disorder, recent episode major depressive, remission ([facility])    Obesity (BMI 30-39.9)    History of noncompliance with medical treatment    Essential hypertension    Tobacco dependence    Gastroesophageal reflux disease without esophagitis    History of marijuana use    Physical debility    Above knee amputation of right lower extremity ([facility])    Vitamin D deficiency    Major depressive disorder, recurrent    MURALI (obstructive sleep apnea)    Adult hypothyroidism    Anxiety and depression    Hyperlipidemia    Neuropathy    Coronary artery disease involving native coronary artery of native heart without angina pectoris    S/P angioplasty with stent of the RCA nov 2022    Chronic diastolic congestive heart failure ([facility])    Atherosclerotic heart disease of native coronary artery with unspecified angina pectoris    Peripheral vascular disease, unspecified    Type 2 diabetes mellitus with chronic kidney disease on chronic dialysis, with long-term current use of insulin ([facility])    Noncompliance f/U REPEATED ISSUE AND RE-ADVISED    Former smoker  quit 2023    Dyslipidemia    Dizziness             Richard Beatty MD, MD, 3/15/2025 9:08 AM           
be contaminated due to normal skin saúl.  - Patient given antibiotics; per per infectious disease treatment of left foot cellulitis  - Applied dressing consisting of: Betadine  - X-rays ordered and reviewed; see above  - Arterial duplex study ordered results pending  -Weight-bear as tolerated left lower extremity  - Discussed with patient that there is no significant probing drainage or malodor that can be appreciated to the dorsal aspect of the left foot.  Patient is being treated for cellulitis primarily at this time with further workup and evaluation for current vascular status.  Discussed with patient we will apply Betadine and continue to monitor and provide local wound care.  Antibiotics to be given per infectious disease.  - All of patient's questions and concerns addressed at today's encounter.  - Podiatry will continue to follow patient    DISPO: Pending vascular workup patient otherwise stable from podiatry standpoint.    Thank you for the consultation allowing podiatry to assist in the medical welfare of this patient. Podiatry will continue to follow this patient throughout the duration of hospitalization.     Karthikeyan Anderson DPM -PGY2  Foot and Ankle Surgical Resident  3/15/2025 7:22 PM

## 2025-03-15 NOTE — ED NOTES
ED to inpatient nurses report      Chief Complaint:  Chief Complaint   Patient presents with    Chest Pain    Dizziness     Present to ED from: Home       MOA:     LOC: alert and orientated to name, place, date  Mobility: Requires assistance * 1  Oxygen Baseline: room air    Current needs required: room air     Code Status:   Prior        Mental Status:  Level of Consciousness: Alert (0)    Psych Assessment:        Vitals:  Patient Vitals for the past 24 hrs:   BP Temp Temp src Pulse Resp SpO2 Height Weight   03/14/25 2016 (!) 140/85 -- -- -- -- -- -- --   03/14/25 2014 -- -- -- 96 21 97 % -- --   03/14/25 1940 -- -- -- 93 22 -- -- --   03/14/25 1910 -- -- -- -- -- 99 % -- --   03/14/25 1855 (!) 143/61 -- -- -- -- 98 % -- --   03/14/25 1832 (!) 153/69 -- -- 90 17 98 % -- --   03/14/25 1726 (!) 153/59 99.1 °F (37.3 °C) Oral (!) 106 18 100 % 1.676 m (5' 6\") 104.3 kg (230 lb)        LDAs:   Peripheral IV 03/14/25 Posterior;Proximal;Right Forearm (Active)       Ambulatory Status:  No data recorded    Diagnosis:  DISPOSITION Admitted 03/14/2025 08:03:14 PM   Final diagnoses:   Chest pain, unspecified type   Cellulitis of left lower extremity   Septicemia (HCC)        Consults:  IP CONSULT TO PODIATRY     Pain Score:  Pain Assessment  Pain Assessment: 0-10  Pain Level: 5  Patient's Stated Pain Goal: 2  Pain Location: Foot  Pain Orientation: Left    C-SSRS:   Risk of Suicide: No Risk    Sepsis Screening:       [place] Fall Risk:       Swallow Screening        Preferred Language:   English      ALLERGIES     Pcn [penicillins], Actos [pioglitazone], Clindamycin/lincomycin, Vancomycin, and Metformin and related    SURGICAL HISTORY       Past Surgical History:   Procedure Laterality Date    ABDOMEN SURGERY      ABSCESS DRAINAGE Right     foot    AMPUTATION ABOVE KNEE Right 4/18/2019    RIGHT ABOVE KNEE AMPUTATION performed by Rey Hanna MD at [facility] OR    CHOLECYSTECTOMY, LAPAROSCOPIC N/A 4/1/2020    ROBOTIC CHOLECYSTECTOMY

## 2025-03-15 NOTE — H&P
History & Physical  Internal Medicine Resident         Patient: Jamie Spangler 55 y.o. male      : 1970  Date of Admission: 3/14/2025  Date of Service: Pt seen/examined on 25 and Admitted to Inpatient with expected LOS greater than two midnights due to medical therapy.       ASSESSMENT AND PLAN  Cellulitis: Sofa score 0, qSOFA 1/3.  Mild leukocytosis of 12.2.  Patient afebrile.  Lactic acid initially 3.5.  Received bolus 1.9 L NS, 1 g Rocephin and 100 mg doxycycline in ED. Has history cellulitis which was treated with Zyvox and Rocephin.  Past culture from left foot swab during  grew MRSA.  Zyvox contraindicated due to current SSRI therapy, therefore if need MRSA coverage consider daptomycin.  Podiatry consulted and following, appreciate input  Blood culture x 2 sent, pending  Continue ceftriaxone 1 g IV daily and doxycycline 100mg IV BID  Infectious disease consult  Lactic acidosis: Lactic acid elevated upon admission at 3.5, recheck down to 2.3.  Trend lactic acid every 2 hours until WNL  Elevated troponin: Troponin mildly elevated upon presentation with recheck downtrending at 31 => 29.  Patient initially had chest pain, was given 324 mg aspirin, but patient reports chronic history of 3-4 episodes of chest pain per week that resolved with nitro.  Chest pain during admission resolved.  EKG with no concerning ST or T wave changes.  No indication for ischemic workup.  Nitro as needed for chest pain  Stat EKG, troponin for persistent chest pain  CHF with diastolic dysfunction, not in exacerbation: Patient endorses exertional dyspnea but states it is at baseline, he is not on any home O2.  Last echo  noted EF 55%.  Complete echocardiogram ordered  Continuing Jardiance 25 mg daily, Lopressor 25 mg twice daily, lisinopril 5 mg daily  Strict I's and O's, daily weights  IDDM 2: Most recent HbA1c from 2024 was 6.8% indicating good glycemic control.  Home regimen includes Lantus 30 units

## 2025-03-16 ENCOUNTER — APPOINTMENT (OUTPATIENT)
Age: 55
DRG: 872 | End: 2025-03-16
Payer: MEDICARE

## 2025-03-16 LAB
ANION GAP SERPL CALC-SCNC: 10 MEQ/L (ref 8–16)
BUN SERPL-MCNC: 23 MG/DL (ref 8–23)
CALCIUM SERPL-MCNC: 9.1 MG/DL (ref 8.6–10)
CHLORIDE SERPL-SCNC: 102 MEQ/L (ref 98–111)
CO2 SERPL-SCNC: 24 MEQ/L (ref 22–29)
CREAT SERPL-MCNC: 1 MG/DL (ref 0.7–1.2)
DEPRECATED RDW RBC AUTO: 46.8 FL (ref 35–45)
ECHO AO ASC DIAM: 3 CM
ECHO AO ASCENDING AORTA INDEX: 1.43 CM/M2
ECHO AV CUSP MM: 1.8 CM
ECHO AV PEAK GRADIENT: 4 MMHG
ECHO AV PEAK VELOCITY: 1 M/S
ECHO AV VELOCITY RATIO: 0.7
ECHO BSA: 2.2 M2
ECHO EST RA PRESSURE: 10 MMHG
ECHO LA DIAMETER INDEX: 1.81 CM/M2
ECHO LA DIAMETER: 3.8 CM
ECHO LV EF PHYSICIAN: 55 %
ECHO LV FRACTIONAL SHORTENING: 30 % (ref 28–44)
ECHO LV INTERNAL DIMENSION DIASTOLE INDEX: 2.05 CM/M2
ECHO LV INTERNAL DIMENSION DIASTOLIC: 4.3 CM (ref 4.2–5.9)
ECHO LV INTERNAL DIMENSION SYSTOLIC INDEX: 1.43 CM/M2
ECHO LV INTERNAL DIMENSION SYSTOLIC: 3 CM
ECHO LV ISOVOLUMETRIC RELAXATION TIME (IVRT): 46 MS
ECHO LV IVSD: 1.1 CM (ref 0.6–1)
ECHO LV MASS 2D: 152.6 G (ref 88–224)
ECHO LV MASS INDEX 2D: 72.6 G/M2 (ref 49–115)
ECHO LV POSTERIOR WALL DIASTOLIC: 1 CM (ref 0.6–1)
ECHO LV RELATIVE WALL THICKNESS RATIO: 0.47
ECHO LVOT PEAK GRADIENT: 2 MMHG
ECHO LVOT PEAK VELOCITY: 0.7 M/S
ECHO MV A VELOCITY: 0.84 M/S
ECHO MV E DECELERATION TIME (DT): 208 MS
ECHO MV E VELOCITY: 0.99 M/S
ECHO MV E/A RATIO: 1.18
ECHO PV MAX VELOCITY: 0.7 M/S
ECHO PV PEAK GRADIENT: 2 MMHG
ECHO RIGHT VENTRICULAR SYSTOLIC PRESSURE (RVSP): 35 MMHG
ECHO TV E WAVE: 0.4 M/S
ECHO TV REGURGITANT MAX VELOCITY: 2.5 M/S
ECHO TV REGURGITANT PEAK GRADIENT: 25 MMHG
ERYTHROCYTE [DISTWIDTH] IN BLOOD BY AUTOMATED COUNT: 13.9 % (ref 11.5–14.5)
FLUAV RNA RESP QL NAA+PROBE: NOT DETECTED
FLUBV RNA RESP QL NAA+PROBE: NOT DETECTED
GFR SERPL CREATININE-BSD FRML MDRD: 88 ML/MIN/1.73M2
GLUCOSE BLD STRIP.AUTO-MCNC: 101 MG/DL (ref 70–108)
GLUCOSE BLD STRIP.AUTO-MCNC: 128 MG/DL (ref 70–108)
GLUCOSE BLD STRIP.AUTO-MCNC: 141 MG/DL (ref 70–108)
GLUCOSE BLD STRIP.AUTO-MCNC: 147 MG/DL (ref 70–108)
GLUCOSE SERPL-MCNC: 106 MG/DL (ref 74–109)
HCT VFR BLD AUTO: 42.7 % (ref 42–52)
HGB BLD-MCNC: 14.3 GM/DL (ref 14–18)
MAGNESIUM SERPL-MCNC: 2.4 MG/DL (ref 1.6–2.6)
MCH RBC QN AUTO: 31.3 PG (ref 26–33)
MCHC RBC AUTO-ENTMCNC: 33.5 GM/DL (ref 32.2–35.5)
MCV RBC AUTO: 93.4 FL (ref 80–94)
PLATELET # BLD AUTO: 231 THOU/MM3 (ref 130–400)
PMV BLD AUTO: 9.8 FL (ref 9.4–12.4)
POTASSIUM SERPL-SCNC: 4.3 MEQ/L (ref 3.5–5.2)
RBC # BLD AUTO: 4.57 MILL/MM3 (ref 4.7–6.1)
SARS-COV-2 RNA RESP QL NAA+PROBE: NOT DETECTED
SODIUM SERPL-SCNC: 136 MEQ/L (ref 135–145)
WBC # BLD AUTO: 11.3 THOU/MM3 (ref 4.8–10.8)

## 2025-03-16 PROCEDURE — 85027 COMPLETE CBC AUTOMATED: CPT

## 2025-03-16 PROCEDURE — 2500000003 HC RX 250 WO HCPCS

## 2025-03-16 PROCEDURE — 2580000003 HC RX 258

## 2025-03-16 PROCEDURE — 6360000002 HC RX W HCPCS

## 2025-03-16 PROCEDURE — 93306 TTE W/DOPPLER COMPLETE: CPT

## 2025-03-16 PROCEDURE — 2500000003 HC RX 250 WO HCPCS: Performed by: STUDENT IN AN ORGANIZED HEALTH CARE EDUCATION/TRAINING PROGRAM

## 2025-03-16 PROCEDURE — 6360000002 HC RX W HCPCS: Performed by: STUDENT IN AN ORGANIZED HEALTH CARE EDUCATION/TRAINING PROGRAM

## 2025-03-16 PROCEDURE — 99232 SBSQ HOSP IP/OBS MODERATE 35: CPT | Performed by: INTERNAL MEDICINE

## 2025-03-16 PROCEDURE — 93306 TTE W/DOPPLER COMPLETE: CPT | Performed by: INTERNAL MEDICINE

## 2025-03-16 PROCEDURE — 87636 SARSCOV2 & INF A&B AMP PRB: CPT

## 2025-03-16 PROCEDURE — 80048 BASIC METABOLIC PNL TOTAL CA: CPT

## 2025-03-16 PROCEDURE — 1200000003 HC TELEMETRY R&B

## 2025-03-16 PROCEDURE — 6370000000 HC RX 637 (ALT 250 FOR IP)

## 2025-03-16 PROCEDURE — 83735 ASSAY OF MAGNESIUM: CPT

## 2025-03-16 PROCEDURE — 36415 COLL VENOUS BLD VENIPUNCTURE: CPT

## 2025-03-16 PROCEDURE — 82948 REAGENT STRIP/BLOOD GLUCOSE: CPT

## 2025-03-16 RX ADMIN — SODIUM CHLORIDE, PRESERVATIVE FREE 10 ML: 5 INJECTION INTRAVENOUS at 08:04

## 2025-03-16 RX ADMIN — ARIPIPRAZOLE 5 MG: 5 TABLET ORAL at 07:57

## 2025-03-16 RX ADMIN — Medication 1000 MG: at 07:57

## 2025-03-16 RX ADMIN — ISOSORBIDE MONONITRATE 30 MG: 30 TABLET, EXTENDED RELEASE ORAL at 07:57

## 2025-03-16 RX ADMIN — CEFTRIAXONE SODIUM 2000 MG: 2 INJECTION, POWDER, FOR SOLUTION INTRAMUSCULAR; INTRAVENOUS at 18:05

## 2025-03-16 RX ADMIN — LEVOTHYROXINE SODIUM 50 MCG: 0.05 TABLET ORAL at 05:45

## 2025-03-16 RX ADMIN — ENOXAPARIN SODIUM 30 MG: 100 INJECTION SUBCUTANEOUS at 07:57

## 2025-03-16 RX ADMIN — ATORVASTATIN CALCIUM 80 MG: 80 TABLET, FILM COATED ORAL at 20:54

## 2025-03-16 RX ADMIN — FUROSEMIDE 20 MG: 20 TABLET ORAL at 07:58

## 2025-03-16 RX ADMIN — METOPROLOL TARTRATE 25 MG: 25 TABLET, FILM COATED ORAL at 07:58

## 2025-03-16 RX ADMIN — DOXYCYCLINE 100 MG: 100 INJECTION, POWDER, LYOPHILIZED, FOR SOLUTION INTRAVENOUS at 20:51

## 2025-03-16 RX ADMIN — SODIUM CHLORIDE, PRESERVATIVE FREE 10 ML: 5 INJECTION INTRAVENOUS at 20:51

## 2025-03-16 RX ADMIN — OXYCODONE HYDROCHLORIDE AND ACETAMINOPHEN 500 MG: 500 TABLET ORAL at 07:57

## 2025-03-16 RX ADMIN — PREGABALIN 75 MG: 75 CAPSULE ORAL at 20:57

## 2025-03-16 RX ADMIN — SERTRALINE 100 MG: 100 TABLET, FILM COATED ORAL at 07:58

## 2025-03-16 RX ADMIN — ASPIRIN 81 MG: 81 TABLET, COATED ORAL at 07:57

## 2025-03-16 RX ADMIN — LISINOPRIL 5 MG: 5 TABLET ORAL at 07:58

## 2025-03-16 RX ADMIN — PREGABALIN 75 MG: 75 CAPSULE ORAL at 07:57

## 2025-03-16 RX ADMIN — EMPAGLIFLOZIN 25 MG: 25 TABLET, FILM COATED ORAL at 07:58

## 2025-03-16 RX ADMIN — Medication 1000 UNITS: at 07:57

## 2025-03-16 RX ADMIN — ENOXAPARIN SODIUM 30 MG: 100 INJECTION SUBCUTANEOUS at 20:58

## 2025-03-16 RX ADMIN — PANTOPRAZOLE SODIUM 40 MG: 40 TABLET, DELAYED RELEASE ORAL at 05:45

## 2025-03-16 RX ADMIN — PREGABALIN 75 MG: 75 CAPSULE ORAL at 13:26

## 2025-03-16 RX ADMIN — METOPROLOL TARTRATE 25 MG: 25 TABLET, FILM COATED ORAL at 20:53

## 2025-03-16 RX ADMIN — DOXYCYCLINE 100 MG: 100 INJECTION, POWDER, LYOPHILIZED, FOR SOLUTION INTRAVENOUS at 08:06

## 2025-03-16 ASSESSMENT — PAIN SCALES - GENERAL
PAINLEVEL_OUTOF10: 4
PAINLEVEL_OUTOF10: 4

## 2025-03-17 ENCOUNTER — APPOINTMENT (OUTPATIENT)
Dept: INTERVENTIONAL RADIOLOGY/VASCULAR | Age: 55
DRG: 872 | End: 2025-03-17
Payer: MEDICARE

## 2025-03-17 PROBLEM — R42 DIZZINESS: Status: RESOLVED | Noted: 2025-03-14 | Resolved: 2025-03-17

## 2025-03-17 PROBLEM — M86.119: Status: RESOLVED | Noted: 2017-08-16 | Resolved: 2025-03-17

## 2025-03-17 PROBLEM — Z95.5 HISTORY OF CORONARY ARTERY STENT PLACEMENT: Status: RESOLVED | Noted: 2025-03-17 | Resolved: 2025-03-17

## 2025-03-17 PROBLEM — L02.511 ABSCESS OF FINGER OF RIGHT HAND: Status: RESOLVED | Noted: 2025-03-17 | Resolved: 2025-03-17

## 2025-03-17 PROBLEM — H25.813 COMBINED FORMS OF AGE-RELATED CATARACT OF BOTH EYES: Status: RESOLVED | Noted: 2024-12-26 | Resolved: 2025-03-17

## 2025-03-17 PROBLEM — H34.9 RETINAL ARTERY OCCLUSION: Status: RESOLVED | Noted: 2024-12-26 | Resolved: 2025-03-17

## 2025-03-17 PROBLEM — R07.9 CHEST PAIN: Status: RESOLVED | Noted: 2025-03-15 | Resolved: 2025-03-17

## 2025-03-17 PROBLEM — R45.851 SUICIDAL IDEATION: Status: RESOLVED | Noted: 2025-03-17 | Resolved: 2025-03-17

## 2025-03-17 PROBLEM — M00.012 STAPHYLOCOCCAL ARTHRITIS OF LEFT SHOULDER (HCC): Status: RESOLVED | Noted: 2017-08-15 | Resolved: 2025-03-17

## 2025-03-17 PROBLEM — N18.9 CHRONIC KIDNEY DISEASE: Status: ACTIVE | Noted: 2025-03-17

## 2025-03-17 PROBLEM — M00.9 SEPTIC JOINT OF LEFT SHOULDER REGION (HCC): Status: RESOLVED | Noted: 2025-03-17 | Resolved: 2025-03-17

## 2025-03-17 PROBLEM — T81.33XA: Status: RESOLVED | Noted: 2025-03-17 | Resolved: 2025-03-17

## 2025-03-17 PROBLEM — F33.2 MDD (MAJOR DEPRESSIVE DISORDER), RECURRENT SEVERE, WITHOUT PSYCHOSIS (HCC): Status: RESOLVED | Noted: 2017-07-07 | Resolved: 2025-03-17

## 2025-03-17 PROBLEM — A41.9 SEPTICEMIA (HCC): Status: RESOLVED | Noted: 2018-09-18 | Resolved: 2025-03-17

## 2025-03-17 LAB
ANION GAP SERPL CALC-SCNC: 10 MEQ/L (ref 8–16)
BUN SERPL-MCNC: 21 MG/DL (ref 8–23)
CALCIUM SERPL-MCNC: 9.2 MG/DL (ref 8.6–10)
CHLORIDE SERPL-SCNC: 107 MEQ/L (ref 98–111)
CO2 SERPL-SCNC: 22 MEQ/L (ref 22–29)
CREAT SERPL-MCNC: 0.9 MG/DL (ref 0.7–1.2)
DEPRECATED RDW RBC AUTO: 45.6 FL (ref 35–45)
ERYTHROCYTE [DISTWIDTH] IN BLOOD BY AUTOMATED COUNT: 13.7 % (ref 11.5–14.5)
GFR SERPL CREATININE-BSD FRML MDRD: > 90 ML/MIN/1.73M2
GLUCOSE BLD STRIP.AUTO-MCNC: 101 MG/DL (ref 70–108)
GLUCOSE BLD STRIP.AUTO-MCNC: 106 MG/DL (ref 70–108)
GLUCOSE BLD STRIP.AUTO-MCNC: 124 MG/DL (ref 70–108)
GLUCOSE BLD STRIP.AUTO-MCNC: 143 MG/DL (ref 70–108)
GLUCOSE SERPL-MCNC: 91 MG/DL (ref 74–109)
HCT VFR BLD AUTO: 39.7 % (ref 42–52)
HGB BLD-MCNC: 13.9 GM/DL (ref 14–18)
MAGNESIUM SERPL-MCNC: 2.3 MG/DL (ref 1.6–2.6)
MCH RBC QN AUTO: 31.7 PG (ref 26–33)
MCHC RBC AUTO-ENTMCNC: 35 GM/DL (ref 32.2–35.5)
MCV RBC AUTO: 90.6 FL (ref 80–94)
PLATELET # BLD AUTO: 185 THOU/MM3 (ref 130–400)
PMV BLD AUTO: 10.1 FL (ref 9.4–12.4)
POTASSIUM SERPL-SCNC: 3.9 MEQ/L (ref 3.5–5.2)
RBC # BLD AUTO: 4.38 MILL/MM3 (ref 4.7–6.1)
SODIUM SERPL-SCNC: 139 MEQ/L (ref 135–145)
WBC # BLD AUTO: 8.9 THOU/MM3 (ref 4.8–10.8)

## 2025-03-17 PROCEDURE — 36415 COLL VENOUS BLD VENIPUNCTURE: CPT

## 2025-03-17 PROCEDURE — 85027 COMPLETE CBC AUTOMATED: CPT

## 2025-03-17 PROCEDURE — 80048 BASIC METABOLIC PNL TOTAL CA: CPT

## 2025-03-17 PROCEDURE — 2500000003 HC RX 250 WO HCPCS: Performed by: STUDENT IN AN ORGANIZED HEALTH CARE EDUCATION/TRAINING PROGRAM

## 2025-03-17 PROCEDURE — 82948 REAGENT STRIP/BLOOD GLUCOSE: CPT

## 2025-03-17 PROCEDURE — 2580000003 HC RX 258

## 2025-03-17 PROCEDURE — 2500000003 HC RX 250 WO HCPCS

## 2025-03-17 PROCEDURE — 6370000000 HC RX 637 (ALT 250 FOR IP)

## 2025-03-17 PROCEDURE — 1200000003 HC TELEMETRY R&B

## 2025-03-17 PROCEDURE — 6360000002 HC RX W HCPCS

## 2025-03-17 PROCEDURE — 83735 ASSAY OF MAGNESIUM: CPT

## 2025-03-17 PROCEDURE — 99233 SBSQ HOSP IP/OBS HIGH 50: CPT

## 2025-03-17 PROCEDURE — 6360000002 HC RX W HCPCS: Performed by: STUDENT IN AN ORGANIZED HEALTH CARE EDUCATION/TRAINING PROGRAM

## 2025-03-17 PROCEDURE — 93926 LOWER EXTREMITY STUDY: CPT

## 2025-03-17 PROCEDURE — 6370000000 HC RX 637 (ALT 250 FOR IP): Performed by: STUDENT IN AN ORGANIZED HEALTH CARE EDUCATION/TRAINING PROGRAM

## 2025-03-17 RX ORDER — DOXYCYCLINE HYCLATE 100 MG
100 TABLET ORAL EVERY 12 HOURS SCHEDULED
Status: DISCONTINUED | OUTPATIENT
Start: 2025-03-17 | End: 2025-03-18 | Stop reason: HOSPADM

## 2025-03-17 RX ORDER — AMOXICILLIN 250 MG/1
250 CAPSULE ORAL EVERY 8 HOURS SCHEDULED
Status: COMPLETED | OUTPATIENT
Start: 2025-03-17 | End: 2025-03-17

## 2025-03-17 RX ORDER — DIPHENHYDRAMINE HYDROCHLORIDE 50 MG/ML
25 INJECTION, SOLUTION INTRAMUSCULAR; INTRAVENOUS PRN
Status: DISCONTINUED | OUTPATIENT
Start: 2025-03-17 | End: 2025-03-18 | Stop reason: HOSPADM

## 2025-03-17 RX ORDER — EPINEPHRINE 1 MG/ML
0.3 INJECTION, SOLUTION INTRAMUSCULAR; SUBCUTANEOUS
Status: DISCONTINUED | OUTPATIENT
Start: 2025-03-17 | End: 2025-03-18 | Stop reason: HOSPADM

## 2025-03-17 RX ORDER — EPINEPHRINE 1 MG/ML
0.3 INJECTION, SOLUTION INTRAMUSCULAR; SUBCUTANEOUS ONCE
Status: DISCONTINUED | OUTPATIENT
Start: 2025-03-17 | End: 2025-03-17

## 2025-03-17 RX ORDER — AMOXICILLIN 250 MG/1
250 CAPSULE ORAL EVERY 8 HOURS SCHEDULED
Status: DISCONTINUED | OUTPATIENT
Start: 2025-03-17 | End: 2025-03-17

## 2025-03-17 RX ADMIN — METOPROLOL TARTRATE 25 MG: 25 TABLET, FILM COATED ORAL at 20:24

## 2025-03-17 RX ADMIN — SODIUM CHLORIDE, PRESERVATIVE FREE 10 ML: 5 INJECTION INTRAVENOUS at 20:27

## 2025-03-17 RX ADMIN — PREGABALIN 75 MG: 75 CAPSULE ORAL at 13:59

## 2025-03-17 RX ADMIN — EMPAGLIFLOZIN 25 MG: 25 TABLET, FILM COATED ORAL at 08:36

## 2025-03-17 RX ADMIN — PREGABALIN 75 MG: 75 CAPSULE ORAL at 08:35

## 2025-03-17 RX ADMIN — OXYCODONE HYDROCHLORIDE AND ACETAMINOPHEN 500 MG: 500 TABLET ORAL at 08:35

## 2025-03-17 RX ADMIN — Medication 1000 UNITS: at 08:35

## 2025-03-17 RX ADMIN — PANTOPRAZOLE SODIUM 40 MG: 40 TABLET, DELAYED RELEASE ORAL at 06:02

## 2025-03-17 RX ADMIN — ENOXAPARIN SODIUM 30 MG: 100 INJECTION SUBCUTANEOUS at 08:35

## 2025-03-17 RX ADMIN — AMOXICILLIN 250 MG: 250 CAPSULE ORAL at 13:59

## 2025-03-17 RX ADMIN — SERTRALINE 100 MG: 100 TABLET, FILM COATED ORAL at 08:36

## 2025-03-17 RX ADMIN — MORPHINE SULFATE 4 MG: 4 INJECTION, SOLUTION INTRAMUSCULAR; INTRAVENOUS at 15:17

## 2025-03-17 RX ADMIN — LISINOPRIL 5 MG: 5 TABLET ORAL at 08:36

## 2025-03-17 RX ADMIN — CEFTRIAXONE SODIUM 2000 MG: 2 INJECTION, POWDER, FOR SOLUTION INTRAMUSCULAR; INTRAVENOUS at 18:54

## 2025-03-17 RX ADMIN — FUROSEMIDE 20 MG: 20 TABLET ORAL at 08:35

## 2025-03-17 RX ADMIN — LEVOTHYROXINE SODIUM 50 MCG: 0.05 TABLET ORAL at 06:02

## 2025-03-17 RX ADMIN — Medication 1000 MG: at 08:35

## 2025-03-17 RX ADMIN — ENOXAPARIN SODIUM 30 MG: 100 INJECTION SUBCUTANEOUS at 20:24

## 2025-03-17 RX ADMIN — ISOSORBIDE MONONITRATE 30 MG: 30 TABLET, EXTENDED RELEASE ORAL at 08:35

## 2025-03-17 RX ADMIN — ATORVASTATIN CALCIUM 80 MG: 80 TABLET, FILM COATED ORAL at 20:24

## 2025-03-17 RX ADMIN — SODIUM CHLORIDE, PRESERVATIVE FREE 10 ML: 5 INJECTION INTRAVENOUS at 08:35

## 2025-03-17 RX ADMIN — ARIPIPRAZOLE 5 MG: 5 TABLET ORAL at 08:35

## 2025-03-17 RX ADMIN — PREGABALIN 75 MG: 75 CAPSULE ORAL at 20:24

## 2025-03-17 RX ADMIN — DOXYCYCLINE HYCLATE 100 MG: 100 TABLET, COATED ORAL at 20:24

## 2025-03-17 RX ADMIN — METOPROLOL TARTRATE 25 MG: 25 TABLET, FILM COATED ORAL at 08:36

## 2025-03-17 RX ADMIN — ASPIRIN 81 MG: 81 TABLET, COATED ORAL at 08:36

## 2025-03-17 RX ADMIN — DOXYCYCLINE 100 MG: 100 INJECTION, POWDER, LYOPHILIZED, FOR SOLUTION INTRAVENOUS at 08:37

## 2025-03-17 ASSESSMENT — ENCOUNTER SYMPTOMS
SORE THROAT: 0
DIARRHEA: 0
COUGH: 0
NAUSEA: 0
CHEST TIGHTNESS: 0
SHORTNESS OF BREATH: 0
BACK PAIN: 0
VOMITING: 0
CONSTIPATION: 0

## 2025-03-17 ASSESSMENT — PAIN SCALES - GENERAL: PAINLEVEL_OUTOF10: 9

## 2025-03-17 NOTE — CARE COORDINATION
Case Management Assessment Initial Evaluation    Date/Time of Evaluation: 3/17/2025 7:15 AM  Assessment Completed by: Aleksandra Wang RN    If patient is discharged prior to next notation, then this note serves as note for discharge by case management.    Patient Name: Jamie Spangler                   YOB: 1970  Diagnosis: Dizziness [R42]  Septicemia (HCC) [A41.9]  Cellulitis of left lower extremity [L03.116]  Chest pain, unspecified type [R07.9]                   Date / Time: 3/14/2025  5:09 PM  Location: [facility]03/003     Patient Admission Status: Inpatient   Readmission Risk Low 0-14, Mod 15-19), High > 20: Readmission Risk Score: 16.5    Current PCP: Phuc Ortega MD  Health Care Decision Makers:   Primary Decision Maker: GiorgiFrantz - Aunt/Uncle - [phone]    Additional Case Management Notes: To ED w/ CP, dizziness and foot bleeding. Hospitalist, ID and Podiatry following. SLP eval.  LLE Arterial DUP pending. Fluid restriction. Telemetry. Asa. IV rocephin q24h. PO doxycycline. SQ lovenox. DM management. IV morphine prn. Trial PO amoxicillin.     Procedures: none    Imaging:   3/14 CXR: Negative  3/14 XR Left Foot: No radiographic evidence for acute osteomyelitis   3/17 LLE Arterial DUP: pending      Patient Goals/Plan/Treatment Preferences: Met w/ Jamie. Verified insurance and PCP. He is independent. He uses [employer] or his electric wheelchair for transportation. Has DME, listed below. Denies needs for HH or OP services. Plans to return home w/ his friends.    03/17/25 0855   Service Assessment   Patient Orientation Alert and Oriented   Cognition Alert   History Provided By Patient   Primary Caregiver Self   Accompanied By/Relationship n/a   Support Systems Family Members   Patient's Healthcare Decision Maker is: Named in Scanned ACP Document   PCP Verified by CM Yes   Last Visit to PCP Within last 3 months   Prior Functional Level Independent in ADLs/IADLs   Current Functional Level

## 2025-03-18 VITALS
SYSTOLIC BLOOD PRESSURE: 108 MMHG | TEMPERATURE: 97.7 F | RESPIRATION RATE: 18 BRPM | DIASTOLIC BLOOD PRESSURE: 70 MMHG | HEART RATE: 77 BPM | BODY MASS INDEX: 37.27 KG/M2 | OXYGEN SATURATION: 98 % | HEIGHT: 66 IN | WEIGHT: 231.92 LBS

## 2025-03-18 LAB
ANION GAP SERPL CALC-SCNC: 12 MEQ/L (ref 8–16)
BUN SERPL-MCNC: 19 MG/DL (ref 8–23)
CALCIUM SERPL-MCNC: 9.3 MG/DL (ref 8.6–10)
CHLORIDE SERPL-SCNC: 105 MEQ/L (ref 98–111)
CO2 SERPL-SCNC: 23 MEQ/L (ref 22–29)
CREAT SERPL-MCNC: 1 MG/DL (ref 0.7–1.2)
DEPRECATED RDW RBC AUTO: 45.4 FL (ref 35–45)
ECHO BSA: 2.2 M2
ERYTHROCYTE [DISTWIDTH] IN BLOOD BY AUTOMATED COUNT: 13.7 % (ref 11.5–14.5)
GFR SERPL CREATININE-BSD FRML MDRD: 88 ML/MIN/1.73M2
GLUCOSE BLD STRIP.AUTO-MCNC: 100 MG/DL (ref 70–108)
GLUCOSE BLD STRIP.AUTO-MCNC: 117 MG/DL (ref 70–108)
GLUCOSE SERPL-MCNC: 88 MG/DL (ref 74–109)
HCT VFR BLD AUTO: 42 % (ref 42–52)
HGB BLD-MCNC: 14.4 GM/DL (ref 14–18)
MAGNESIUM SERPL-MCNC: 2.3 MG/DL (ref 1.6–2.6)
MCH RBC QN AUTO: 31.4 PG (ref 26–33)
MCHC RBC AUTO-ENTMCNC: 34.3 GM/DL (ref 32.2–35.5)
MCV RBC AUTO: 91.5 FL (ref 80–94)
PLATELET # BLD AUTO: 183 THOU/MM3 (ref 130–400)
PMV BLD AUTO: 9.9 FL (ref 9.4–12.4)
POTASSIUM SERPL-SCNC: 4.4 MEQ/L (ref 3.5–5.2)
RBC # BLD AUTO: 4.59 MILL/MM3 (ref 4.7–6.1)
SODIUM SERPL-SCNC: 140 MEQ/L (ref 135–145)
WBC # BLD AUTO: 9.2 THOU/MM3 (ref 4.8–10.8)

## 2025-03-18 PROCEDURE — 83735 ASSAY OF MAGNESIUM: CPT

## 2025-03-18 PROCEDURE — 36415 COLL VENOUS BLD VENIPUNCTURE: CPT

## 2025-03-18 PROCEDURE — 6370000000 HC RX 637 (ALT 250 FOR IP): Performed by: STUDENT IN AN ORGANIZED HEALTH CARE EDUCATION/TRAINING PROGRAM

## 2025-03-18 PROCEDURE — 82948 REAGENT STRIP/BLOOD GLUCOSE: CPT

## 2025-03-18 PROCEDURE — 2500000003 HC RX 250 WO HCPCS

## 2025-03-18 PROCEDURE — 85027 COMPLETE CBC AUTOMATED: CPT

## 2025-03-18 PROCEDURE — 6370000000 HC RX 637 (ALT 250 FOR IP)

## 2025-03-18 PROCEDURE — 80048 BASIC METABOLIC PNL TOTAL CA: CPT

## 2025-03-18 PROCEDURE — 6360000002 HC RX W HCPCS

## 2025-03-18 RX ORDER — DOXYCYCLINE HYCLATE 100 MG
100 TABLET ORAL 2 TIMES DAILY
Qty: 20 TABLET | Refills: 0 | Status: SHIPPED | OUTPATIENT
Start: 2025-03-18 | End: 2025-03-28

## 2025-03-18 RX ADMIN — OXYCODONE HYDROCHLORIDE AND ACETAMINOPHEN 500 MG: 500 TABLET ORAL at 08:09

## 2025-03-18 RX ADMIN — DOXYCYCLINE HYCLATE 100 MG: 100 TABLET, COATED ORAL at 08:08

## 2025-03-18 RX ADMIN — SODIUM CHLORIDE, PRESERVATIVE FREE 10 ML: 5 INJECTION INTRAVENOUS at 08:09

## 2025-03-18 RX ADMIN — EMPAGLIFLOZIN 25 MG: 25 TABLET, FILM COATED ORAL at 08:08

## 2025-03-18 RX ADMIN — LEVOTHYROXINE SODIUM 50 MCG: 0.05 TABLET ORAL at 06:02

## 2025-03-18 RX ADMIN — Medication 1000 UNITS: at 08:08

## 2025-03-18 RX ADMIN — PANTOPRAZOLE SODIUM 40 MG: 40 TABLET, DELAYED RELEASE ORAL at 06:02

## 2025-03-18 RX ADMIN — ASPIRIN 81 MG: 81 TABLET, COATED ORAL at 08:09

## 2025-03-18 RX ADMIN — METOPROLOL TARTRATE 25 MG: 25 TABLET, FILM COATED ORAL at 08:08

## 2025-03-18 RX ADMIN — ARIPIPRAZOLE 5 MG: 5 TABLET ORAL at 08:09

## 2025-03-18 RX ADMIN — FUROSEMIDE 20 MG: 20 TABLET ORAL at 08:09

## 2025-03-18 RX ADMIN — LISINOPRIL 5 MG: 5 TABLET ORAL at 08:08

## 2025-03-18 RX ADMIN — PREGABALIN 75 MG: 75 CAPSULE ORAL at 08:09

## 2025-03-18 RX ADMIN — ISOSORBIDE MONONITRATE 30 MG: 30 TABLET, EXTENDED RELEASE ORAL at 08:08

## 2025-03-18 RX ADMIN — SERTRALINE 100 MG: 100 TABLET, FILM COATED ORAL at 08:08

## 2025-03-18 RX ADMIN — Medication 1000 MG: at 08:08

## 2025-03-18 RX ADMIN — ENOXAPARIN SODIUM 30 MG: 100 INJECTION SUBCUTANEOUS at 08:09

## 2025-03-18 NOTE — CARE COORDINATION
3/18/25, 10:32 AM EDT    Patient goals/plan/ treatment preferences discussed by  and .  Patient goals/plan/ treatment preferences reviewed with patient/ family.  Patient/ family verbalize understanding of discharge plan and are in agreement with goal/plan/treatment preferences.  Understanding was demonstrated using the teach back method.  AVS provided by RN at time of discharge, which includes all necessary medical information pertaining to the patients current course of illness, treatment, post-discharge goals of care, and treatment preferences.     Services At/After Discharge: None    Met w/ Jamie. He is agreeable with discharge today. Plans to return home w/ his friends. Has needed DME. Denies discharge needs.

## 2025-03-18 NOTE — PLAN OF CARE
Problem: Chronic Conditions and Co-morbidities  Goal: Patient's chronic conditions and co-morbidity symptoms are monitored and maintained or improved  3/18/2025 1051 by Alisson Avila RN  Outcome: Progressing  Flowsheets (Taken 3/18/2025 1051)  Care Plan - Patient's Chronic Conditions and Co-Morbidity Symptoms are Monitored and Maintained or Improved:   Collaborate with multidisciplinary team to address chronic and comorbid conditions and prevent exacerbation or deterioration   Monitor and assess patient's chronic conditions and comorbid symptoms for stability, deterioration, or improvement     Problem: Discharge Planning  Goal: Discharge to home or other facility with appropriate resources  3/18/2025 1051 by Alisson Avila RN  Outcome: Progressing  Flowsheets (Taken 3/18/2025 1051)  Discharge to home or other facility with appropriate resources:   Identify barriers to discharge with patient and caregiver   Identify discharge learning needs (meds, wound care, etc)     Problem: Pain  Goal: Verbalizes/displays adequate comfort level or baseline comfort level  3/18/2025 1051 by Alisson Avila RN  Outcome: Progressing  Flowsheets (Taken 3/18/2025 1051)  Verbalizes/displays adequate comfort level or baseline comfort level: Encourage patient to monitor pain and request assistance  Note: No complaint of pain voiced at this time.  Continue to monitor. PRN medications available if needed.      Problem: Safety - Adult  Goal: Free from fall injury  3/18/2025 1051 by Alisson Avila RN  Outcome: Progressing  Flowsheets (Taken 3/18/2025 1051)  Free From Fall Injury:   Based on caregiver fall risk screen, instruct family/caregiver to ask for assistance with transferring infant if caregiver noted to have fall risk factors   Instruct family/caregiver on patient safety  Note: No falls noted this shift. Continue falling star program. Bed alarm on, bed in low position. Call light and personal belongings in reach.  Patient uses call 
  Problem: Chronic Conditions and Co-morbidities  Goal: Patient's chronic conditions and co-morbidity symptoms are monitored and maintained or improved  Outcome: Progressing  Flowsheets (Taken 3/14/2025 2051)  Care Plan - Patient's Chronic Conditions and Co-Morbidity Symptoms are Monitored and Maintained or Improved:   Collaborate with multidisciplinary team to address chronic and comorbid conditions and prevent exacerbation or deterioration   Monitor and assess patient's chronic conditions and comorbid symptoms for stability, deterioration, or improvement     Problem: Discharge Planning  Goal: Discharge to home or other facility with appropriate resources  Outcome: Progressing     Problem: Pain  Goal: Verbalizes/displays adequate comfort level or baseline comfort level  Outcome: Progressing  Flowsheets (Taken 3/14/2025 2051)  Verbalizes/displays adequate comfort level or baseline comfort level:   Encourage patient to monitor pain and request assistance   Assess pain using appropriate pain scale     Problem: Safety - Adult  Goal: Free from fall injury  Outcome: Progressing     
  Problem: Chronic Conditions and Co-morbidities  Goal: Patient's chronic conditions and co-morbidity symptoms are monitored and maintained or improved  Outcome: Progressing  Flowsheets (Taken 3/16/2025 1149)  Care Plan - Patient's Chronic Conditions and Co-Morbidity Symptoms are Monitored and Maintained or Improved:   Monitor and assess patient's chronic conditions and comorbid symptoms for stability, deterioration, or improvement   Collaborate with multidisciplinary team to address chronic and comorbid conditions and prevent exacerbation or deterioration     Problem: Discharge Planning  Goal: Discharge to home or other facility with appropriate resources  Outcome: Progressing  Flowsheets (Taken 3/16/2025 1149)  Discharge to home or other facility with appropriate resources:   Identify discharge learning needs (meds, wound care, etc)   Identify barriers to discharge with patient and caregiver     Problem: Pain  Goal: Verbalizes/displays adequate comfort level or baseline comfort level  Outcome: Progressing  Flowsheets (Taken 3/16/2025 1149)  Verbalizes/displays adequate comfort level or baseline comfort level:   Administer analgesics based on type and severity of pain and evaluate response   Encourage patient to monitor pain and request assistance  Note: No complaint of pain voiced at this time.  Continue to monitor. PRN medications available if needed.      Problem: Safety - Adult  Goal: Free from fall injury  Outcome: Progressing  Flowsheets (Taken 3/16/2025 1149)  Free From Fall Injury:   Instruct family/caregiver on patient safety   Based on caregiver fall risk screen, instruct family/caregiver to ask for assistance with transferring infant if caregiver noted to have fall risk factors  Note: No falls noted this shift. Continue falling star program. Bed alarm on, bed in low position. Call light and personal belongings in reach.  Patient uses call light appropriately.      Problem: Skin/Tissue Integrity  Goal: 
  Problem: Chronic Conditions and Co-morbidities  Goal: Patient's chronic conditions and co-morbidity symptoms are monitored and maintained or improved  Outcome: Progressing  Flowsheets (Taken 3/17/2025 0934)  Care Plan - Patient's Chronic Conditions and Co-Morbidity Symptoms are Monitored and Maintained or Improved:   Monitor and assess patient's chronic conditions and comorbid symptoms for stability, deterioration, or improvement   Collaborate with multidisciplinary team to address chronic and comorbid conditions and prevent exacerbation or deterioration     Problem: Discharge Planning  Goal: Discharge to home or other facility with appropriate resources  Outcome: Progressing  Flowsheets (Taken 3/17/2025 0934)  Discharge to home or other facility with appropriate resources:   Identify barriers to discharge with patient and caregiver   Identify discharge learning needs (meds, wound care, etc)     Problem: Pain  Goal: Verbalizes/displays adequate comfort level or baseline comfort level  3/17/2025 0934 by Alisson Avila RN  Outcome: Progressing  Flowsheets (Taken 3/17/2025 0934)  Verbalizes/displays adequate comfort level or baseline comfort level:   Encourage patient to monitor pain and request assistance   Administer analgesics based on type and severity of pain and evaluate response  Note: No complaint of pain voiced at this time.  Continue to monitor. PRN medications available if needed.      Problem: Safety - Adult  Goal: Free from fall injury  3/17/2025 0934 by Alisson Avila RN  Outcome: Progressing  Flowsheets (Taken 3/17/2025 0934)  Free From Fall Injury:   Instruct family/caregiver on patient safety   Based on caregiver fall risk screen, instruct family/caregiver to ask for assistance with transferring infant if caregiver noted to have fall risk factors  Note: No falls noted this shift. Continue falling star program. Bed alarm on, bed in low position. Call light and personal belongings in reach.  Patient 
  Problem: Pain  Goal: Verbalizes/displays adequate comfort level or baseline comfort level  3/16/2025 2102 by Skyla Manning, RN  Outcome: Progressing     Problem: Safety - Adult  Goal: Free from fall injury  3/16/2025 2102 by Skyla Manning, RN  Outcome: Progressing     
Assess vascular access sites hourly  3.  Every 4-6 hours minimum:  Change oxygen saturation probe site  4.  Every 4-6 hours:  If on nasal continuous positive airway pressure, respiratory therapy assess nares and determine need for appliance change or resting period  3/15/2025 2139 by Lizeth Pereira, RN  Outcome: Progressing  Flowsheets (Taken 3/15/2025 1921)  Skin Integrity Remains Intact:   Monitor for areas of redness and/or skin breakdown   Assess vascular access sites hourly  3/15/2025 0859 by Matthew López, RN  Outcome: Progressing     
areas of redness and/or skin breakdown  Note: No new signs or symptoms of skin breakdown noted this shift, encouraging patient to turn and reposition self in bed q2h

## 2025-03-18 NOTE — PROGRESS NOTES
[facility] Infectious Disease         Progress Note      Jamie Spangler  MEDICAL RECORD NUMBER:  [id number]  AGE: 55 y.o.   GENDER: male  : 1970  EPISODE DATE:  3/14/2025      Assessment:     Patient is a 55-year-old male who I am consulted for second digit infection, diabetic foot ulcer.  Currently on therapy with ceftriaxone doxycycline.     I personally examined the area of ulceration on the dorsal aspect of the left foot.  There does not appear to be significant evidence of overlying infection.  This area does not probe down to bone.  Reviewed imaging which is not consistent with osteomyelitis. I briefly discussed case with podiatry in room and would recommend wound care.  This patient does not require a prolonged course of antimicrobials, I would recommend 10 days of therapy for possible overlying cellulitis.      I will attempt a trial a penicillin on patient on  with low-dose amoxicillin given that he has reportedly received amoxicillin before without effect adverse reactions.  It has been over 10 years since this was last attempted and patient cannot recall when his reaction occurred.     Continue ceftriaxone and doxycycline   Will trial amoxicillin 250mg x1 today   - If no reaction, will plan on doxycycline and augmentin for discharge, tolerating well at 2 hours  Tentative duration for 10 days   EOT 3/25/25  Case discussed with podiatry service  No indication for more advanced imaging  Does not probe to bone      Subjective:     Chief Complaint   Patient presents with    Chest Pain    Dizziness         No acute events overnight.  No new complaints or concerns this morning.  Amoxicillin trial performed without significant reaction.      Patient Active Problem List   Diagnosis Code    Gait disturbance R26.9    Severe bipolar II disorder, recent episode major depressive, remission ([facility]) F31.81    Obesity (BMI 30-39.9) E66.9    History of noncompliance with medical treatment Z91.199    
          [facility] Infectious Disease         Progress Note      Jamie Spangler  MEDICAL RECORD NUMBER:  [id number]  AGE: 55 y.o.   GENDER: male  : 1970  EPISODE DATE:  3/14/2025      Assessment:     Patient is a 55-year-old male who I am consulted for second digit infection, diabetic foot ulcer.  Currently on therapy with ceftriaxone doxycycline.     I personally examined the area of ulceration on the dorsal aspect of the left foot.  There does not appear to be significant evidence of overlying infection.  This area does not probe down to bone.  Reviewed imaging which is not consistent with osteomyelitis.  I briefly discussed case with podiatry in room and would recommend wound care.  This patient does not require a prolonged course of antimicrobials, I would recommend 10 days of therapy for possible overlying cellulitis.      I will attempt a trial a penicillin on patient on  with low-dose amoxicillin given that he has reportedly received amoxicillin before without effect adverse reactions.  It has been over 10 years since this was last attempted and patient cannot recall when his reaction occurred.   Unable to perform penicillin trial on      Continue ceftriaxone and doxycycline  Tentative duration for 10 days  Case discussed with podiatry service  No indication for more advanced imaging  Does not probe to bone      Subjective:     Chief Complaint   Patient presents with    Chest Pain    Dizziness         No acute events overnight.  No new complaints or concerns this morning.  I reexamined the foot which appears largely similar to exam on March 15.      Patient Active Problem List   Diagnosis Code    Gait disturbance R26.9    Severe bipolar II disorder, recent episode major depressive, remission ([facility]) F31.81    Obesity (BMI 30-39.9) E66.9    History of noncompliance with medical treatment Z91.199    Essential hypertension I10    Septicemia ([facility]) A41.9    Diabetic ulcer of toe of left foot 
          [facility] Infectious Disease         Progress Note      Jamie Spangler  MEDICAL RECORD NUMBER:  [id number]  AGE: 55 y.o.   GENDER: male  : 1970  EPISODE DATE:  3/14/2025      Assessment:     Patient is a 55-year-old male who I am consulted for second digit infection, diabetic foot ulcer.  Currently on therapy with ceftriaxone doxycycline.     I personally examined the area of ulceration on the dorsal aspect of the left foot.  There does not appear to be significant evidence of overlying infection.  This area does not probe down to bone.  Reviewed imaging which is not consistent with osteomyelitis. I briefly discussed case with podiatry in room and would recommend wound care.  This patient does not require a prolonged course of antimicrobials, I would recommend 10 days of therapy for possible overlying cellulitis.      I will attempt a trial a penicillin on patient on  with low-dose amoxicillin given that he has reportedly received amoxicillin before without effect adverse reactions.  It has been over 10 years since this was last attempted and patient cannot recall when his reaction occurred.     Currently on ceftriaxone and doxycycline  Patient tolerated amoxicillin trial on  without significant reaction   I have removed penicillin allergy     Therapy plan  Augmentin 875 twice daily  Doxycycline 100 mg twice daily  Tentative duration for 10 days   EOT 3/25/25      Subjective:     Chief Complaint   Patient presents with    Chest Pain    Dizziness         No acute events overnight.  No new complaints or concerns this morning.  Amoxicillin trial performed without significant reaction.      Patient Active Problem List   Diagnosis Code    Gait disturbance R26.9    Severe bipolar II disorder, recent episode major depressive, remission ([facility]) F31.81    Obesity (BMI 30-39.9) E66.9    History of noncompliance with medical treatment Z91.199    Essential hypertension I10    Diabetic ulcer of 
     [facility]  Podiatric Medicine and Surgery Progress Note      Jamie Spangler    Subjective      3/17/25  Patient seen bedside today on behalf of Dr Cruz. Patient appeared pleasant, was oriented to person, place and time and in no acute distress. Patient states he is doing well with no pain to his foot. He states he is yet to have vascular studies done while he's been here at [facility] but states he has had previous studies that showed he had poor blood flow to the LLE. States he charisma a long history of smoking. Patient denies any N/V/F/C/SOB or CP. No other pedal concerns.    History of present illness    The patient is a 55 y.o. male with significant past medical history of PAD, COPD, diabetes mellitus, peripheral neuropathy, polyneuropathy, right foot AKA, MRSA who is being seen at bedside on behalf of Dr. Cruz.  Patient states that he was taking off his sock few days ago on the left foot when he felt a scab get pulled off leaving a open wound to the top of his left foot.  He has noticed some increased redness and swelling since the incident.  Patient has extensive history of multiple amputations on the right lower extremity ultimately leading to an AKA approximately 10 to 12 years ago.  Patient utilizes a electric chair for transportation.  He also has a wound to the outside of his left foot that he states he sees Dr. Cruz for and has an appointment scheduled for this upcoming Tuesday.  Patient denies any systemic symptoms.  No other pedal concerns.           Past medical history        Diagnosis Date    Atherosclerotic heart disease of native coronary artery with unspecified angina pectoris 1/18/2022    Bipolar 2 disorder (HCC)     previously followed with Dr. Taylor Dueñas and Augie Morel in [place]    BPH (benign prostatic hyperplasia)     Chronic diastolic congestive heart failure (HCC) 3/1/2023    COPD (chronic obstructive pulmonary disease) ([facility])     Diabetes mellitus 
    Hospitalist Progress Note    Patient:  Jamie Spangler      Unit/Bed:6K-03/003-A    YOB: 1970    MRN: [id number]       Acct: [id number]     PCP: Phuc Ortega MD    Date of Admission: 3/14/2025    Active Hospital Problems    Diagnosis Date Noted    Chest pain [R07.9] 03/15/2025    Dizziness [R42] 03/14/2025     Assessment/Plan:    # Diabetic Left Foot Cellulitis/Ulcer: Sofa score 0, qSOFA 1/3.  Mild leukocytosis of 12.2.  Patient afebrile.  Lactic acid initially 3.5.  XRAY foot no osteomyelitis. Pending blood cultures. ID and Podiatry consulted. Cont IV Rocephin and Doxy. Pending vascular studies of lower extremity     # Elevated troponin: Troponin mildly elevated upon presentation with recheck downtrending at 31 => 29.  Patient initially had chest pain, was given 324 mg aspirin, but patient reports chronic history of 3-4 episodes of chest pain per week that resolved with nitro.  Chest pain during admission resolved.  EKG with no concerning ST or T wave changes.  No indication for ischemic workup     # CHF with diastolic dysfunction, not in exacerbation: Patient endorses exertional dyspnea but states it is at baseline, he is not on any home O2.  Last echo 2023 noted EF 55%. Continuing Jardiance 25 mg daily, Lopressor 25 mg twice daily, lisinopril 5 mg daily     # IDDM 2: Most recent HbA1c from February 2024 was 6.8% indicating good glycemic control.  Home regimen includes Lantus 30 units twice daily, lispro 35 units 3 times daily with meals, in addition to Jardiance 25 mg daily and Trulicity once weekly     # History of esophageal dysphagia: Patient has followed with GI for dysphagia but reportedly does not have dietary restrictions.  Has recently had hemoptysis, has informed GI and in process of making appointment for repeat EGD       Chronic Conditions (reviewed and stable unless otherwise stated)   Hypertension: Continue lisinopril, Lopressor, Imdur  Hyperlipidemia: Patient had formally been 
  Physician Progress Note      PATIENT:               FABIÁN PENA  CSN #:                  [id number]  :                       1970  ADMIT DATE:       3/14/2025 5:09 PM  DISCH DATE:  RESPONDING  PROVIDER #:        Lucas Dorado PA-C          QUERY TEXT:    Patient admitted with long-term diabetes, diabetic foot ulcer. Documentation   reflects Sepsis in final diagnosis in ED note(s) dated 3-14.  If possible,   please document in the progress notes and discharge summary if Sepsis was:    The medical record reflects the following:  Risk Factors: Cellulites  Clinical Indicators: WBC 12.2, LA 3.5, mildly tachycardic , /59,   resp 22/min. Mild leukocytosis of 12.2.  Patient afebrile.  Lactic acid   initially 3.5. Has history cellulitis which was treated  Treatment: Received bolus 1.9 L NS, 1 g Rocephin and 100 mg doxycycline in ED.   Zyvox and Rocephin.  Past culture from left foot swab during  grew MRSA.    Zyvox contraindicated due to current SSRI therapy, therefore if need MRSA   coverage consider daptomycin    Thank you. Karolina Woodall RN, Clinical Documentation Integrity, [employer]   [employer], [facility], CRCR  Options provided:  -- Sepsis  ruled out after study  -- Sepsis confirmed after study and was present on admission, Please document   evidence.  -- Other - I will add my own diagnosis  -- Disagree - Not applicable / Not valid  -- Disagree - Clinically unable to determine / Unknown  -- Refer to Clinical Documentation Reviewer    PROVIDER RESPONSE TEXT:    Sepsis confirmed after study and was present on admission    Query created by: Karolina Woodall on 3/17/2025 2:11 PM      Electronically signed by:  Lucas Dorado PA-C 3/17/2025 2:15 PM          
  Physician Progress Note      PATIENT:               FABIÁN PENA  CSN #:                  [id number]  :                       1970  ADMIT DATE:       3/14/2025 5:09 PM  DISCH DATE:  RESPONDING  PROVIDER #:        Lucas Dorado PA-C          QUERY TEXT:    Pt admitted with Cellulitis of left lower extremity. Pt noted to have   long-term diabetes and Diabetic Left Foot Cellulitis/Ulcer. If possible,   please document in progress notes and discharge summary the relationship, if   any, between cellulitis and DM.    The medical record reflects the following:  Risk Factors: Diabetes  Clinical Indicators: Cellulitis: Sofa score 0, qSOFA 1/3.  Mild leukocytosis   of 12.2.  Patient afebrile.  Lactic acid initially 3.5.  Treatment: Received bolus 1.9 L NS, 1 g Rocephin and 100 mg doxycycline in ED.   Has history cellulitis which was treated with Zyvox and Rocephin.  Past   culture from left foot swab during  grew MRSA.  Zyvox contraindicated due   to current SSRI therapy, therefore if need MRSA coverage consider daptomycin  Thank you. Karolina Woodall RN, Clinical Documentation Integrity, [employer]   [employer], [facility], CRCR  Options provided:  -- Cellulitis of left lower extremity associated with Diabetes  -- Cellulitis of left lower extremity unrelated to Diabetes  -- Other - I will add my own diagnosis  -- Disagree - Not applicable / Not valid  -- Disagree - Clinically unable to determine / Unknown  -- Refer to Clinical Documentation Reviewer    PROVIDER RESPONSE TEXT:    Cellulitis of left lower extremity associated with Diabetes.    Query created by: Karolina Woodall on 3/17/2025 2:14 PM      Electronically signed by:  Lucas Dorado PA-C 3/17/2025 2:16 PM          
Discharge teaching and instructions for diagnosis/procedure of chest and toe pain completed with patient using teachback method. AVS reviewed. Printed prescriptions given to patient. Patient voiced understanding regarding prescriptions, follow up appointments, and care of self at home. Discharged in a wheelchair to  home with support per self.   
Echo completed at bedside.  Echo given to Dr. Carrero.  
Patient refusing to wear portable telemetry box at this time. Patient was educated about the importance of the heart monitor and knows the risks involved.   
RN called into room by tech due to patients knee bleeding. Patient noted to have a new abrasion on his knee with dried blood. Patient does not know how he started bleeding or if he hit his knee on something. Bleeding noted to be stopped. Patient cleaned up. Refusing dressing at this time.   
extremity and distal upper extremities bilaterally.  Superior visual field deficit in right eye.  Cranial nerves:  grossly non-focal 2-12.     Psychiatric: Alert and oriented, normal insight and thought content.   Capillary Refill: Brisk,< 3 seconds.  Peripheral Pulses: +2 palpable, equal bilaterally.       Labs:   Recent Labs     03/16/25  0756   WBC 11.3*   HGB 14.3   HCT 42.7        Recent Labs     03/16/25  0756      K 4.3      CO2 24   BUN 23   CREATININE 1.0   CALCIUM 9.1     Recent Labs     03/14/25  1849   AST 33   ALT 18   BILITOT 0.4   ALKPHOS 76     No results for input(s): \"INR\" in the last 72 hours.  No results for input(s): \"CKTOTAL\", \"TROPONINI\" in the last 72 hours.    Urinalysis:      Lab Results   Component Value Date/Time    NITRU NEGATIVE 12/01/2022 09:39 AM    WBCUA 0-2 12/01/2022 09:39 AM    BACTERIA NONE SEEN 12/01/2022 09:39 AM    RBCUA 0-2 12/01/2022 09:39 AM    BLOODU NEGATIVE 12/01/2022 09:39 AM    GLUCOSEU 100 12/01/2022 09:39 AM    GLUCOSEU 250 07/14/2022 10:30 PM       Radiology:  All imaging reviewed     Diet: ADULT DIET; Regular; 4 carb choices (60 gm/meal); Low Sodium (2 gm); 2000 ml      Code Status: Full Code            Electronically signed by Chelita Monson MD on 3/16/2025 at 11:42 AM    
chart or assessment above.     Electronically signed by Lucas Dorado PA-C on 3/17/2025 at 2:06 PM\

## 2025-03-19 ENCOUNTER — CARE COORDINATION (OUTPATIENT)
Dept: CARE COORDINATION | Age: 55
End: 2025-03-19

## 2025-03-19 DIAGNOSIS — I10 ESSENTIAL HYPERTENSION: ICD-10-CM

## 2025-03-19 DIAGNOSIS — K21.9 GASTROESOPHAGEAL REFLUX DISEASE WITHOUT ESOPHAGITIS: ICD-10-CM

## 2025-03-19 DIAGNOSIS — E11.65 TYPE 2 DIABETES MELLITUS WITH HYPERGLYCEMIA, WITH LONG-TERM CURRENT USE OF INSULIN (HCC): ICD-10-CM

## 2025-03-19 DIAGNOSIS — E78.2 MIXED HYPERLIPIDEMIA: ICD-10-CM

## 2025-03-19 DIAGNOSIS — Z79.4 TYPE 2 DIABETES MELLITUS WITH HYPERGLYCEMIA, WITH LONG-TERM CURRENT USE OF INSULIN (HCC): ICD-10-CM

## 2025-03-19 DIAGNOSIS — L03.116 CELLULITIS OF LEFT LOWER EXTREMITY: Primary | ICD-10-CM

## 2025-03-19 LAB
BACTERIA BLD AEROBE CULT: NORMAL
BACTERIA BLD AEROBE CULT: NORMAL

## 2025-03-19 PROCEDURE — 1111F DSCHRG MED/CURRENT MED MERGE: CPT | Performed by: INTERNAL MEDICINE

## 2025-03-19 RX ORDER — ACYCLOVIR 400 MG/1
TABLET ORAL
Qty: 1 EACH | Refills: 0 | OUTPATIENT
Start: 2025-03-19

## 2025-03-19 RX ORDER — ATORVASTATIN CALCIUM 80 MG/1
80 TABLET, FILM COATED ORAL NIGHTLY
Qty: 90 TABLET | Refills: 1 | Status: CANCELLED | OUTPATIENT
Start: 2025-03-19

## 2025-03-19 RX ORDER — ATORVASTATIN CALCIUM 80 MG/1
80 TABLET, FILM COATED ORAL NIGHTLY
Qty: 90 TABLET | Refills: 1 | Status: SHIPPED | OUTPATIENT
Start: 2025-03-19

## 2025-03-19 RX ORDER — OMEPRAZOLE 40 MG/1
CAPSULE, DELAYED RELEASE ORAL
Qty: 90 CAPSULE | Refills: 1 | OUTPATIENT
Start: 2025-03-19

## 2025-03-19 RX ORDER — ATORVASTATIN CALCIUM 80 MG/1
80 TABLET, FILM COATED ORAL NIGHTLY
Qty: 90 TABLET | Refills: 1 | OUTPATIENT
Start: 2025-03-19

## 2025-03-19 NOTE — CARE COORDINATION
Care Transitions Note    Initial Call - Call within 2 business days of discharge: Yes    Patient Current Location:  Home: [address] [place] [address]    Care Transition Nurse contacted the patient by telephone to perform post hospital discharge assessment, verified name and  as identifiers.  Provided introduction to self, and explanation of the Care Transition Nurse role.    Patient: Jamie Spangler    Patient : 1970   MRN: [id number]    Reason for Admission: chest pain dizziness  Discharge Date: 3/18/25  RURS: Readmission Risk Score: 16.9      Last Discharge Facility       Date Complaint Diagnosis Description Type Department Provider    3/14/25 Chest Pain; Dizziness Chest pain, unspecified type ... ED to Hosp-Admission (Discharged) (ADMITTED) BLANK TapiaK Lucas Dorado PA-C; Dany Shah, ...            Was this an external facility discharge? No    Additional needs identified to be addressed with provider   No needs identified             Method of communication with provider: none.    Patients top risk factors for readmission: lack of knowledge about disease, medical condition-L fooet ulcer cellulitis septicemia, multiple health system providers, and polypharmacy    Interventions to address risk factors:   Review of patient management of conditions/medications: chest pain, septicemia L foot cellulitis    Care Summary Note: CTN call to Jamie today and he is on his way to [facility] for an appt. Says he is still having chest pain & dizziness. Denies sob, falls, n/v/d, swelling, fever, chills, malaise.  Meds reviewed and are correct. Taking Doxy and Augmentin.  BLOOD SUGAR= fasting  DM 3/20/25  Cardio 3/24/25  PCP 3/26/25  DPM= he will call to schedule.  Denies need for transportation.  Uses electric wheelchair to get around.  Says L foot looks the same.  Discussed seeking ER medical attention for new or worsening sxs. He expressed understanding.  Eating, drinking & sleeping ok. Denies problems w/

## 2025-03-19 NOTE — DISCHARGE SUMMARY
Medications        These medications were sent to [facility] - [place], [place] - [address] - P [phone] - F [phone]  [address], [place] [address]      Phone: [phone]   amoxicillin-clavulanate 875-125 MG per tablet  doxycycline hyclate 100 MG tablet         Time Spent on discharge is more than 45 minutes in the examination, evaluation, counseling and review of medications and discharge plan.    Signed:    Thank you Phuc Ortega MD for the opportunity to be involved in this patient's care.    Electronically signed by Lucas Dorado PA-C on 3/19/2025 at 12:24 PM

## 2025-03-19 NOTE — PROGRESS NOTES
Patient of Dr. Ortega    Last ordered 10/29/24, disp 90, refill 1    Last visit 2/12  Next visit 6/24

## 2025-03-20 ENCOUNTER — OFFICE VISIT (OUTPATIENT)
Dept: INTERNAL MEDICINE CLINIC | Age: 55
End: 2025-03-20
Payer: MEDICARE

## 2025-03-20 ENCOUNTER — CARE COORDINATION (OUTPATIENT)
Dept: CARE COORDINATION | Age: 55
End: 2025-03-20

## 2025-03-20 VITALS
BODY MASS INDEX: 37.43 KG/M2 | HEIGHT: 66 IN | HEART RATE: 72 BPM | SYSTOLIC BLOOD PRESSURE: 122 MMHG | TEMPERATURE: 98.4 F | DIASTOLIC BLOOD PRESSURE: 64 MMHG

## 2025-03-20 DIAGNOSIS — I10 ESSENTIAL HYPERTENSION: ICD-10-CM

## 2025-03-20 DIAGNOSIS — N18.6 TYPE 2 DIABETES MELLITUS WITH CHRONIC KIDNEY DISEASE ON CHRONIC DIALYSIS, WITH LONG-TERM CURRENT USE OF INSULIN (HCC): Primary | ICD-10-CM

## 2025-03-20 DIAGNOSIS — Z99.2 TYPE 2 DIABETES MELLITUS WITH CHRONIC KIDNEY DISEASE ON CHRONIC DIALYSIS, WITH LONG-TERM CURRENT USE OF INSULIN (HCC): Primary | ICD-10-CM

## 2025-03-20 DIAGNOSIS — K21.9 GASTROESOPHAGEAL REFLUX DISEASE WITHOUT ESOPHAGITIS: ICD-10-CM

## 2025-03-20 DIAGNOSIS — E11.22 TYPE 2 DIABETES MELLITUS WITH CHRONIC KIDNEY DISEASE ON CHRONIC DIALYSIS, WITH LONG-TERM CURRENT USE OF INSULIN (HCC): Primary | ICD-10-CM

## 2025-03-20 DIAGNOSIS — Z79.4 TYPE 2 DIABETES MELLITUS WITH CHRONIC KIDNEY DISEASE ON CHRONIC DIALYSIS, WITH LONG-TERM CURRENT USE OF INSULIN (HCC): Primary | ICD-10-CM

## 2025-03-20 DIAGNOSIS — E78.2 MIXED HYPERLIPIDEMIA: ICD-10-CM

## 2025-03-20 PROCEDURE — G0108 DIAB MANAGE TRN  PER INDIV: HCPCS | Performed by: REGISTERED NURSE

## 2025-03-20 NOTE — PATIENT INSTRUCTIONS
Continue to use Toujeo 25 units morning and night  Humalog 35 units with meals           Add 5 units if your blood sugar before the meal is over 200      Also at a mealtime when you are not eating=consider                   5 units for blood sugar over 200  When you get unusual blood sugars --check accuracy by doing         A calibration with a finger stick --go under events in the menu         -we weill request a Freestyle meter/ strips/ lancets  Great job with eercise efforts!

## 2025-03-20 NOTE — TELEPHONE ENCOUNTER
Future Appointments   Date Time Provider Department Center   3/20/2025 10:00 AM Lia Cruz RN [facility] Physic [facility] DEP   3/24/2025  3:00 PM David Hong MD N [facility] Heart [facility] [place]   3/26/2025  3:00 PM Kathy Lopez DO [facility] Physic [facility] DEP   6/24/2025 10:15 AM Phuc Ortega MD [facility] Physic [facility] DEP

## 2025-03-20 NOTE — PROGRESS NOTES
ACM, PCP.  Education on importance of monitoring.  Educated on when to check.    Confidence: 6/10  Anticipated Goal Completion Date: 2/7/20         Take insulin as directed/ follow guidelines   Not on track           Meter download, medications, PMH and nursing assessment reviewed.  Jamie Spangler states He is willing to participate in this plan of care and verbalized understanding of all instructions provided. Teach back used to verify comprehension.      Follow-up: 6 months    Total time involved in direct patient education: 60 minutes.   Individual Education appointment justified due to: Need for Additional Insulin Training

## 2025-03-20 NOTE — CARE COORDINATION
Care Transitions Note    Follow Up Call     Attempted to reach patient for transitions of care follow up.  Unable to reach patient.      Outreach Attempts:   Multiple attempts to contact patient at phone numbers on file.   HIPAA compliant voicemail left for patient.     Care Summary Note: called [phone], left vm     Follow Up Appointment:   Future Appointments         Provider Specialty Dept Phone    3/24/2025 3:00 PM David Hong MD Cardiology [phone]    3/26/2025 3:00 PM Kathy Lopez DO Internal Medicine [phone]    6/24/2025 10:15 AM Phuc Ortega MD Internal Medicine [phone]    9/18/2025 10:00 AM Melisa Haley [facility] Internal Medicine [phone]            Plan for follow-up on next business day.  based on severity of symptoms and risk factors. Plan for next call:     Natasha Karimi LPN

## 2025-03-21 ENCOUNTER — CARE COORDINATION (OUTPATIENT)
Dept: CARE COORDINATION | Age: 55
End: 2025-03-21

## 2025-03-21 NOTE — PATIENT INSTRUCTIONS
You may receive a survey regarding the care you received during your visit. We encourage you to complete and return your survey, as your input is valuable to us. We hope you will choose us in the future for your healthcare needs. Thank you!    Your Medical Assistant today: Sunny Salinas  Thank you for coming to our office! It was a pleasure to serve you.

## 2025-03-21 NOTE — CARE COORDINATION
Care Transitions Note  1st attempt yesterday, 03/20/2025.  2nd attempt today, 03/21/2025.    Follow Up Call     Attempted to reach patient for transitions of care follow up.  Unable to reach patient.      Outreach Attempts:   Multiple attempts to contact patient at phone numbers on file.   HIPAA compliant voicemail left for patient.     Care Summary Note: called [phone], left vm at 09:27    Follow Up Appointment:   Future Appointments         Provider Specialty Dept Phone    3/24/2025 3:00 PM David Hong MD Cardiology [phone]    3/26/2025 3:00 PM Kathy Lopez  Internal Medicine [phone]    6/24/2025 10:15 AM Phuc Ortega MD Internal Medicine [phone]    9[facility]18[facility]2025 10:[facility] [phone]          No further follow-up call was indicated on day 2 of no response, closing the program according to protocol.      Natasha Karimi LPN

## 2025-03-23 ENCOUNTER — TELEPHONE (OUTPATIENT)
Dept: INTERNAL MEDICINE CLINIC | Age: 55
End: 2025-03-23

## 2025-03-23 DIAGNOSIS — Z79.4 TYPE 2 DIABETES MELLITUS WITH CHRONIC KIDNEY DISEASE ON CHRONIC DIALYSIS, WITH LONG-TERM CURRENT USE OF INSULIN (HCC): Primary | ICD-10-CM

## 2025-03-23 DIAGNOSIS — N18.6 TYPE 2 DIABETES MELLITUS WITH CHRONIC KIDNEY DISEASE ON CHRONIC DIALYSIS, WITH LONG-TERM CURRENT USE OF INSULIN (HCC): Primary | ICD-10-CM

## 2025-03-23 DIAGNOSIS — Z99.2 TYPE 2 DIABETES MELLITUS WITH CHRONIC KIDNEY DISEASE ON CHRONIC DIALYSIS, WITH LONG-TERM CURRENT USE OF INSULIN (HCC): Primary | ICD-10-CM

## 2025-03-23 DIAGNOSIS — E11.22 TYPE 2 DIABETES MELLITUS WITH CHRONIC KIDNEY DISEASE ON CHRONIC DIALYSIS, WITH LONG-TERM CURRENT USE OF INSULIN (HCC): Primary | ICD-10-CM

## 2025-03-24 ENCOUNTER — OFFICE VISIT (OUTPATIENT)
Dept: CARDIOLOGY CLINIC | Age: 55
End: 2025-03-24
Payer: MEDICARE

## 2025-03-24 VITALS
DIASTOLIC BLOOD PRESSURE: 69 MMHG | HEART RATE: 108 BPM | SYSTOLIC BLOOD PRESSURE: 116 MMHG | HEIGHT: 66 IN | BODY MASS INDEX: 37.43 KG/M2

## 2025-03-24 DIAGNOSIS — Z87.891 FORMER SMOKER: ICD-10-CM

## 2025-03-24 DIAGNOSIS — I50.32 CHRONIC DIASTOLIC CONGESTIVE HEART FAILURE (HCC): ICD-10-CM

## 2025-03-24 DIAGNOSIS — E78.00 PURE HYPERCHOLESTEROLEMIA: ICD-10-CM

## 2025-03-24 DIAGNOSIS — Z95.820 S/P ANGIOPLASTY WITH STENT: ICD-10-CM

## 2025-03-24 DIAGNOSIS — R07.89 CHEST PAIN, ATYPICAL: Primary | ICD-10-CM

## 2025-03-24 DIAGNOSIS — I25.10 CORONARY ARTERY DISEASE INVOLVING NATIVE CORONARY ARTERY OF NATIVE HEART, UNSPECIFIED WHETHER ANGINA PRESENT: ICD-10-CM

## 2025-03-24 DIAGNOSIS — I10 ESSENTIAL HYPERTENSION: ICD-10-CM

## 2025-03-24 PROCEDURE — G8417 CALC BMI ABV UP PARAM F/U: HCPCS | Performed by: INTERNAL MEDICINE

## 2025-03-24 PROCEDURE — 99214 OFFICE O/P EST MOD 30 MIN: CPT | Performed by: INTERNAL MEDICINE

## 2025-03-24 PROCEDURE — 3017F COLORECTAL CA SCREEN DOC REV: CPT | Performed by: INTERNAL MEDICINE

## 2025-03-24 PROCEDURE — 1036F TOBACCO NON-USER: CPT | Performed by: INTERNAL MEDICINE

## 2025-03-24 PROCEDURE — G8427 DOCREV CUR MEDS BY ELIG CLIN: HCPCS | Performed by: INTERNAL MEDICINE

## 2025-03-24 PROCEDURE — 3078F DIAST BP <80 MM HG: CPT | Performed by: INTERNAL MEDICINE

## 2025-03-24 PROCEDURE — 1111F DSCHRG MED/CURRENT MED MERGE: CPT | Performed by: INTERNAL MEDICINE

## 2025-03-24 PROCEDURE — 3074F SYST BP LT 130 MM HG: CPT | Performed by: INTERNAL MEDICINE

## 2025-03-24 RX ORDER — RANOLAZINE 500 MG/1
500 TABLET, EXTENDED RELEASE ORAL 2 TIMES DAILY
Qty: 60 TABLET | Refills: 3 | Status: SHIPPED | OUTPATIENT
Start: 2025-03-24

## 2025-03-24 RX ORDER — ISOSORBIDE MONONITRATE 60 MG/1
60 TABLET, EXTENDED RELEASE ORAL DAILY
Qty: 30 TABLET | Refills: 3 | Status: SHIPPED | OUTPATIENT
Start: 2025-03-24

## 2025-03-24 NOTE — PROGRESS NOTES
myocardial infarction in view of the troponin elevation  and recurrent chest pain.  3.  COVID-19, upper respiratory tract infection with cough, productive.  4.  History of cardiac catheterization in 2018 at [facility], mid LAD  50%, RCA 30%.  5.  Diabetes.  6.  Hyperlipidemia.  7.  Above-knee amputation on the right.  8.  Hypothyroidism.          admission DX 2020    ASSESSMENT:  1.  Shortness of breath, basically unexplained the course of which is  not very clear, so probably the patient may benefit from pulmonary  function study on that line and once cardiac cause of short of breath  has been ruled out.  He has history of smoking, and he has truncal  obesity.  2.  Hypertension.  3.  Diabetes with complication, neuropathy and diabetic foot abscess,  above-the-knee amputation on the right, wheelchair-bound.  4.  Hypothyroidism.  5.  Anxiety, depression, bipolar disorder.  6.  Above-the-knee amputation on the right related to diabetes.  7.  Wheelchair-bound.  8.  Palpitation, intermittent in the last several months.  9.  History of intermittent chest pain once in a while.      Plan     The   current meds and labs reviewdd    Patient Seen, Chart, Consults notes, Labs, Radiology studies reviewed.    Coronary artery disease, seems to be stable. Denies  change in breathing pattern  PAT TOOK HIMSELFT OFF ASA AND BRILINTA  ADVISED TO RESUME ASA 81 MG PO QD  AND ADVISED TO TAKE IT LIFE LONG UNLESS INTOLERANCE  Cont statin lopressor  Ekg no new abn no acute abn  Cont with GDMT  SL NTG as needed    Chest pain atypical  Abn EKG  Omt  Increase imdur to 60  Start ranexa  500 bid  Beni nuc    Hypertension, on medical treatment. Seems to be under good control. Patient is compliant with medical treatment.      Hyperlipidemia: on statins, followed periodically. Patient need periodic lipid and liver profile.    Congestive heart failure: no evidence of fluid overload today, no recent hospitalization for CHF  On lasix 20 po qd not on

## 2025-03-24 NOTE — TELEPHONE ENCOUNTER
Jamie reports that he does not have a meter/ strips/lancets to check his blood sugar to calibrate his Dexcom G7 or as a back up to having unavailable sensors. He is asking for Freestyle meter. Scripts pended    CPA Dr. Ortega

## 2025-03-25 RX ORDER — AVOBENZONE, HOMOSALATE, OCTISALATE, OCTOCRYLENE 30; 40; 45; 26 MG/ML; MG/ML; MG/ML; MG/ML
1 CREAM TOPICAL 2 TIMES DAILY
Qty: 100 EACH | Refills: 3 | Status: SHIPPED | OUTPATIENT
Start: 2025-03-25

## 2025-03-25 RX ORDER — BLOOD-GLUCOSE METER
1 KIT MISCELLANEOUS ONCE
Qty: 1 EACH | Refills: 0 | Status: SHIPPED | OUTPATIENT
Start: 2025-03-25 | End: 2025-03-26

## 2025-03-26 ENCOUNTER — OFFICE VISIT (OUTPATIENT)
Dept: INTERNAL MEDICINE CLINIC | Age: 55
End: 2025-03-26

## 2025-03-26 VITALS
BODY MASS INDEX: 35.36 KG/M2 | WEIGHT: 220 LBS | HEART RATE: 98 BPM | HEIGHT: 66 IN | OXYGEN SATURATION: 98 % | SYSTOLIC BLOOD PRESSURE: 122 MMHG | DIASTOLIC BLOOD PRESSURE: 72 MMHG

## 2025-03-26 DIAGNOSIS — R09.A2 GLOBUS SENSATION: ICD-10-CM

## 2025-03-26 DIAGNOSIS — Z97.10 EMPLOYS PROSTHETIC LEG: ICD-10-CM

## 2025-03-26 DIAGNOSIS — E78.5 DYSLIPIDEMIA: ICD-10-CM

## 2025-03-26 DIAGNOSIS — J31.0 CHRONIC RHINITIS: ICD-10-CM

## 2025-03-26 DIAGNOSIS — Z89.611 ACQUIRED ABSENCE OF RIGHT LEG ABOVE KNEE (HCC): ICD-10-CM

## 2025-03-26 DIAGNOSIS — I20.9 ANGINA PECTORIS, UNSPECIFIED: Primary | ICD-10-CM

## 2025-03-26 RX ORDER — LIDOCAINE 50 MG/G
1 PATCH TOPICAL DAILY
Qty: 10 PATCH | Refills: 0 | Status: SHIPPED | OUTPATIENT
Start: 2025-03-26 | End: 2025-04-05

## 2025-03-26 NOTE — PROGRESS NOTES
Care Transitions Initial Follow Up Call    Outreach made within 2 business days of discharge: No    Patient: Jamie Spangler Patient : 1970   MRN: [id number]  Reason for Admission: Diabetic ulcer  Discharge Date: 3/18/25       Spoke with: Jamie    Discharge department/facility: [facility]    TCM Interactive Patient Contact:  Was patient able to fill all prescriptions: Yes  Was patient instructed to bring all medications to the follow-up visit: Yes  Is patient taking all medications as directed in the discharge summary? Yes  Does patient understand their discharge instructions: Yes  Does patient have questions or concerns that need addressed prior to 7-14 day follow up office visit: no    Additional needs identified to be addressed with provider  No needs identified             Scheduled appointment with PCP within 7-14 days    Follow Up  Future Appointments   Date Time Provider Department Center   3/26/2025  3:00 PM Thor Saldivar DO SRPX Physic [facility] DEP   2025  8:00 AM STR NUC MED HC  INJECT  RM1 STRZ NUC MED STR Rad/Card   2025  8:30 AM STR NUCLEAR MEDICINE HEART CENTER ROOM 1 STRZ NUC MED STR Rad/Card   2025  9:00 AM STR STRESS LAB 1 STRZ COLLEEN STR Rad/Card   2025  9:30 AM STR NUCLEAR [facility] HEART CENTER ROOM 1 STRZ NUC MED STR Rad/Card   2025 10:15 AM David Hong MD N SRPX Heart P - [place]   2025 10:15 AM Phuc Ortega MD SRPX Physic [facility] DEP   2025 10:00 AM Melisa Haley [facility] SRPX Physic [facility] DEP       Frannie Farias MA     Have the medications prescribed at discharge been filled? yes  Does the patient understand what the medications are for? yes  Has the patient experienced any new symptoms or have previous symptoms worsened? Still having chest pain  Is the patient experiencing any pain? yes If yes, is the pain well controlled? no  We want to be sure the patient has the best recovery possible. Does the patient understand all the discharge instructions?

## 2025-03-26 NOTE — PROGRESS NOTES
Post-Discharge Transitional Care  Follow Up      Jamie Spangler   YOB: 1970    Date of Office Visit:  3/26/2025  Date of Hospital Admission: 3/14/25  Date of Hospital Discharge: 3/18/25  Risk of hospital readmission (high >=14%. 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    On this date 3/26/2025 I have spent 30 minutes reviewing previous notes, test results and face to face with the patient discussing the diagnosis and importance of compliance with the treatment plan as well as documenting on the day of the visit.     Lidocaine patch given for angina and Zyrtec given for Allergies  Subjective:   HPI:  Follow up of Hospital problems/diagnosis(es): Diabetic Ulcer of toe and left foot with concurrent cellulitis. CAD, HFpEF, PVD, T2DM    Inpatient course: Discharge summary reviewed- see chart.    Interval history/Current status: Seen cardiologist in OP. Stress test ordered, pending. Echo normal. Has Appt with podiatry next week. Stress test and EGD scheduled in 3 months    Patient Active Problem List   Diagnosis    Gait disturbance    Severe bipolar II disorder, recent episode major depressive, remission (HCC)    Obesity (BMI 30-39.9)    History of noncompliance with medical treatment    Essential hypertension    Diabetic

## 2025-03-27 ENCOUNTER — TELEPHONE (OUTPATIENT)
Dept: INTERNAL MEDICINE CLINIC | Age: 55
End: 2025-03-27

## 2025-03-30 ENCOUNTER — HOSPITAL ENCOUNTER (EMERGENCY)
Age: 55
Discharge: HOME | End: 2025-03-30
Attending: FAMILY MEDICINE
Payer: MEDICARE

## 2025-03-30 ENCOUNTER — APPOINTMENT (OUTPATIENT)
Dept: GENERAL RADIOLOGY | Age: 55
End: 2025-03-30
Payer: MEDICARE

## 2025-03-30 VITALS
HEIGHT: 66 IN | TEMPERATURE: 98.9 F | DIASTOLIC BLOOD PRESSURE: 66 MMHG | WEIGHT: 220 LBS | RESPIRATION RATE: 20 BRPM | BODY MASS INDEX: 35.36 KG/M2 | OXYGEN SATURATION: 99 % | HEART RATE: 88 BPM | SYSTOLIC BLOOD PRESSURE: 131 MMHG

## 2025-03-30 DIAGNOSIS — R07.89 ATYPICAL CHEST PAIN: Primary | ICD-10-CM

## 2025-03-30 LAB
ALBUMIN SERPL BCG-MCNC: 4.1 G/DL (ref 3.4–4.9)
ALP SERPL-CCNC: 77 U/L (ref 40–129)
ALT SERPL W/O P-5'-P-CCNC: 20 U/L (ref 10–50)
ANION GAP SERPL CALC-SCNC: 13 MEQ/L (ref 8–16)
AST SERPL-CCNC: 32 U/L (ref 10–50)
BASOPHILS ABSOLUTE: 0.1 THOU/MM3 (ref 0–0.1)
BASOPHILS NFR BLD AUTO: 0.5 %
BILIRUB CONJ SERPL-MCNC: 0.2 MG/DL (ref 0–0.2)
BILIRUB SERPL-MCNC: 0.4 MG/DL (ref 0.3–1.2)
BUN SERPL-MCNC: 16 MG/DL (ref 8–23)
CALCIUM SERPL-MCNC: 9.6 MG/DL (ref 8.6–10)
CHLORIDE SERPL-SCNC: 101 MEQ/L (ref 98–111)
CO2 SERPL-SCNC: 23 MEQ/L (ref 22–29)
CREAT SERPL-MCNC: 0.8 MG/DL (ref 0.7–1.2)
DEPRECATED RDW RBC AUTO: 46.2 FL (ref 35–45)
EKG ATRIAL RATE: 92 BPM
EKG P-R INTERVAL: 176 MS
EKG Q-T INTERVAL: 352 MS
EKG QRS DURATION: 72 MS
EKG QTC CALCULATION (BAZETT): 435 MS
EKG R AXIS: 11 DEGREES
EKG T AXIS: 40 DEGREES
EKG VENTRICULAR RATE: 92 BPM
EOSINOPHIL NFR BLD AUTO: 3.3 %
EOSINOPHILS ABSOLUTE: 0.4 THOU/MM3 (ref 0–0.4)
ERYTHROCYTE [DISTWIDTH] IN BLOOD BY AUTOMATED COUNT: 13.9 % (ref 11.5–14.5)
GFR SERPL CREATININE-BSD FRML MDRD: > 90 ML/MIN/1.73M2
GLUCOSE SERPL-MCNC: 127 MG/DL (ref 74–109)
HCT VFR BLD AUTO: 45.8 % (ref 42–52)
HGB BLD-MCNC: 15.9 GM/DL (ref 14–18)
IMM GRANULOCYTES # BLD AUTO: 0.05 THOU/MM3 (ref 0–0.07)
IMM GRANULOCYTES NFR BLD AUTO: 0.4 %
LYMPHOCYTES ABSOLUTE: 3 THOU/MM3 (ref 1–4.8)
LYMPHOCYTES NFR BLD AUTO: 22.4 %
MAGNESIUM SERPL-MCNC: 2.3 MG/DL (ref 1.6–2.6)
MCH RBC QN AUTO: 31.6 PG (ref 26–33)
MCHC RBC AUTO-ENTMCNC: 34.7 GM/DL (ref 32.2–35.5)
MCV RBC AUTO: 91.1 FL (ref 80–94)
MONOCYTES ABSOLUTE: 1.2 THOU/MM3 (ref 0.4–1.3)
MONOCYTES NFR BLD AUTO: 8.6 %
NEUTROPHILS ABSOLUTE: 8.7 THOU/MM3 (ref 1.8–7.7)
NEUTROPHILS NFR BLD AUTO: 64.8 %
NRBC BLD AUTO-RTO: 0 /100 WBC
OSMOLALITY SERPL CALC.SUM OF ELEC: 276.6 MOSMOL/KG (ref 275–300)
PLATELET # BLD AUTO: 224 THOU/MM3 (ref 130–400)
PMV BLD AUTO: 9.7 FL (ref 9.4–12.4)
POTASSIUM SERPL-SCNC: 4.3 MEQ/L (ref 3.5–5.2)
PROT SERPL-MCNC: 7.6 G/DL (ref 6.4–8.3)
RBC # BLD AUTO: 5.03 MILL/MM3 (ref 4.7–6.1)
SODIUM SERPL-SCNC: 137 MEQ/L (ref 135–145)
TROPONIN, HIGH SENSITIVITY: 32 NG/L (ref 0–12)
WBC # BLD AUTO: 13.5 THOU/MM3 (ref 4.8–10.8)

## 2025-03-30 PROCEDURE — 82248 BILIRUBIN DIRECT: CPT

## 2025-03-30 PROCEDURE — 93005 ELECTROCARDIOGRAM TRACING: CPT | Performed by: FAMILY MEDICINE

## 2025-03-30 PROCEDURE — 71045 X-RAY EXAM CHEST 1 VIEW: CPT

## 2025-03-30 PROCEDURE — 99285 EMERGENCY DEPT VISIT HI MDM: CPT

## 2025-03-30 PROCEDURE — 80053 COMPREHEN METABOLIC PANEL: CPT

## 2025-03-30 PROCEDURE — 83735 ASSAY OF MAGNESIUM: CPT

## 2025-03-30 PROCEDURE — 36415 COLL VENOUS BLD VENIPUNCTURE: CPT

## 2025-03-30 PROCEDURE — 85025 COMPLETE CBC W/AUTO DIFF WBC: CPT

## 2025-03-30 PROCEDURE — 96374 THER/PROPH/DIAG INJ IV PUSH: CPT

## 2025-03-30 PROCEDURE — 6360000002 HC RX W HCPCS: Performed by: FAMILY MEDICINE

## 2025-03-30 PROCEDURE — 84484 ASSAY OF TROPONIN QUANT: CPT

## 2025-03-30 RX ORDER — CYCLOBENZAPRINE HCL 10 MG
10 TABLET ORAL 3 TIMES DAILY PRN
Qty: 21 TABLET | Refills: 0 | Status: ON HOLD | OUTPATIENT
Start: 2025-03-30 | End: 2025-04-09

## 2025-03-30 RX ORDER — KETOROLAC TROMETHAMINE 30 MG/ML
15 INJECTION, SOLUTION INTRAMUSCULAR; INTRAVENOUS ONCE
Status: COMPLETED | OUTPATIENT
Start: 2025-03-30 | End: 2025-03-30

## 2025-03-30 RX ADMIN — KETOROLAC TROMETHAMINE 15 MG: 30 INJECTION, SOLUTION INTRAMUSCULAR at 19:16

## 2025-03-30 ASSESSMENT — PAIN DESCRIPTION - PAIN TYPE: TYPE: ACUTE PAIN

## 2025-03-30 ASSESSMENT — PAIN SCALES - GENERAL
PAINLEVEL_OUTOF10: 4
PAINLEVEL_OUTOF10: 9

## 2025-03-30 ASSESSMENT — HEART SCORE: ECG: NORMAL

## 2025-03-30 ASSESSMENT — PAIN DESCRIPTION - ORIENTATION: ORIENTATION: RIGHT

## 2025-03-30 ASSESSMENT — PAIN - FUNCTIONAL ASSESSMENT
PAIN_FUNCTIONAL_ASSESSMENT: 0-10
PAIN_FUNCTIONAL_ASSESSMENT: 0-10

## 2025-03-30 ASSESSMENT — PAIN DESCRIPTION - LOCATION
LOCATION: CHEST
LOCATION: CHEST

## 2025-03-30 NOTE — ED TRIAGE NOTES
Pt to the ED with c/o chest pain. Pt reports the pain has been ongoing for awhile but worsened today while he was working out. Pt reports he has a stent in his heart and sees Dr. Paez for cardiology. Pt reports the pain is all on the right side of his chest. EKG obtained upon arrival to ED room.

## 2025-03-30 NOTE — ED PROVIDER NOTES
ecchymosis   Cardiovascular: Reg rate, normal rhythm, no murmurs   Vascular: Radial pulses 2+ equal bilaterally   Integument: Skin warm and dry, no petechiae   Neurologic: Alert & oriented, normal speech   MSK: chronic stable above knee amputation of RLE    EKG (interpreted by me) 3/30/25 18:55  Rhythm: Sinus   Rate: 92 BPM   Axis: normal   Conduction: normal   ST/T Segments: nonacute without acute ischemic changes    RADIOLOGY/PROCEDURES   XR CHEST PORTABLE   Final Result   1. No acute cardiopulmonary finding..               **This report has been created using voice recognition software.  It may contain   minor errors which are inherent in voice recognition technology.**      Electronically signed [facility] Dr. [facility]           LABS   Labs Reviewed   BASIC METABOLIC PANEL - Abnormal; Notable for the following components:       Result Value    Glucose 127 (*)     All other components within normal limits   CBC WITH AUTO DIFFERENTIAL - Abnormal; Notable for the following components:    WBC 13.5 (*)     RDW-SD 46.2 (*)     Neutrophils Absolute 8.7 (*)     All other components within normal limits   TROPONIN - Abnormal; Notable for the following components:    Troponin, High Sensitivity 32 (*)     All other components within normal limits   HEPATIC FUNCTION PANEL   MAGNESIUM   ANION GAP   OSMOLALITY   GLOMERULAR FILTRATION RATE, ESTIMATED        INITIAL IMPRESSION:  Chest pain of acute nature, of unknown etiology. Pt has not had any hemodynamic instability. He was not diaphoretic, was comfortable. His initial EKG did not show any signs of ischemia.    PLAN:  I ordered and reviewed pt's cardiology workup, including EKG, CXR and labs. Pt took his daily aspirin. The pain was partially reproducible. Pt is scheduled for an outpatient stress test in the next couple of weeks if not earlier.    Differential Diagnosis: Costochondritis, Pleurisy, Acute Coronary Syndrome, Congestive Heart Failure, Myocardial Infarction,

## 2025-03-31 NOTE — DISCHARGE INSTRUCTIONS
WE DID NOT FIND ANY SERIOUS CAUSES FOR YOUR CHEST PAIN. PLEASE APPLY HEAT TO THE AFFECTED MUSCLES.    TAKE FLEXERIL (MUSCLE RELAXER) 2-3 TIMES DAILY AS NEEDED.    RETURN IF WORSE.

## 2025-04-01 ENCOUNTER — TELEPHONE (OUTPATIENT)
Dept: INTERNAL MEDICINE CLINIC | Age: 55
End: 2025-04-01

## 2025-04-01 ENCOUNTER — HOSPITAL ENCOUNTER (OUTPATIENT)
Age: 55
Discharge: HOME | End: 2025-04-03
Attending: INTERNAL MEDICINE
Payer: MEDICARE

## 2025-04-01 ENCOUNTER — HOSPITAL ENCOUNTER (OUTPATIENT)
Dept: NUCLEAR MEDICINE | Age: 55
Discharge: HOME | End: 2025-04-01
Attending: INTERNAL MEDICINE
Payer: MEDICARE

## 2025-04-01 VITALS — WEIGHT: 220 LBS | HEIGHT: 66 IN | BODY MASS INDEX: 35.36 KG/M2

## 2025-04-01 DIAGNOSIS — I10 ESSENTIAL HYPERTENSION: ICD-10-CM

## 2025-04-01 DIAGNOSIS — Z95.820 S/P ANGIOPLASTY WITH STENT: ICD-10-CM

## 2025-04-01 DIAGNOSIS — E78.00 PURE HYPERCHOLESTEROLEMIA: ICD-10-CM

## 2025-04-01 DIAGNOSIS — I50.32 CHRONIC DIASTOLIC CONGESTIVE HEART FAILURE (HCC): ICD-10-CM

## 2025-04-01 DIAGNOSIS — I25.10 CORONARY ARTERY DISEASE INVOLVING NATIVE CORONARY ARTERY OF NATIVE HEART, UNSPECIFIED WHETHER ANGINA PRESENT: ICD-10-CM

## 2025-04-01 DIAGNOSIS — R07.89 CHEST PAIN, ATYPICAL: ICD-10-CM

## 2025-04-01 DIAGNOSIS — Z87.891 FORMER SMOKER: ICD-10-CM

## 2025-04-01 LAB
ECHO BSA: 2.16 M2
NUC STRESS EJECTION FRACTION: 69 %
STRESS BASELINE DIAS BP: 64 MMHG
STRESS BASELINE HR: 85 BPM
STRESS BASELINE ST DEPRESSION: 0 MM
STRESS BASELINE SYS BP: 128 MMHG
STRESS ESTIMATED WORKLOAD: 1 METS
STRESS PEAK DIAS BP: 64 MMHG
STRESS PEAK SYS BP: 128 MMHG
STRESS PERCENT HR ACHIEVED: 62 %
STRESS POST PEAK HR: 101 BPM
STRESS RATE PRESSURE PRODUCT: NORMAL BPM*MMHG
STRESS ST DEPRESSION: 0 MM
STRESS STAGE 1 BP: NORMAL MMHG
STRESS STAGE 1 DURATION: 1 MIN:SEC
STRESS STAGE 1 HR: 93 BPM
STRESS STAGE 2 BP: NORMAL MMHG
STRESS STAGE 2 DURATION: 1 MIN:SEC
STRESS STAGE 2 HR: 99 BPM
STRESS STAGE 3 BP: NORMAL MMHG
STRESS STAGE 3 DURATION: 1 MIN:SEC
STRESS STAGE 3 HR: 97 BPM
STRESS STAGE RECOVERY 1 BP: NORMAL MMHG
STRESS STAGE RECOVERY 1 DURATION: 1 MIN:SEC
STRESS STAGE RECOVERY 1 HR: 96 BPM
STRESS STAGE RECOVERY 2 BP: NORMAL MMHG
STRESS STAGE RECOVERY 2 DURATION: 1 MIN:SEC
STRESS STAGE RECOVERY 2 HR: 95 BPM
STRESS STAGE RECOVERY 3 BP: NORMAL MMHG
STRESS STAGE RECOVERY 3 DURATION: 1 MIN:SEC
STRESS STAGE RECOVERY 3 HR: 93 BPM
STRESS STAGE RECOVERY 4 BP: NORMAL MMHG
STRESS STAGE RECOVERY 4 DURATION: 2 MIN:SEC
STRESS STAGE RECOVERY 4 HR: 93 BPM
STRESS TARGET HR: 163 BPM
TID: 1.05

## 2025-04-01 PROCEDURE — A9500 TC99M SESTAMIBI: HCPCS | Performed by: INTERNAL MEDICINE

## 2025-04-01 PROCEDURE — 78452 HT MUSCLE IMAGE SPECT MULT: CPT

## 2025-04-01 PROCEDURE — 93017 CV STRESS TEST TRACING ONLY: CPT

## 2025-04-01 PROCEDURE — 6360000002 HC RX W HCPCS: Performed by: INTERNAL MEDICINE

## 2025-04-01 PROCEDURE — 3430000000 HC RX DIAGNOSTIC RADIOPHARMACEUTICAL: Performed by: INTERNAL MEDICINE

## 2025-04-01 RX ORDER — TETRAKIS(2-METHOXYISOBUTYLISOCYANIDE)COPPER(I) TETRAFLUOROBORATE 1 MG/ML
10.2 INJECTION, POWDER, LYOPHILIZED, FOR SOLUTION INTRAVENOUS
Status: COMPLETED | OUTPATIENT
Start: 2025-04-01 | End: 2025-04-01

## 2025-04-01 RX ORDER — TETRAKIS(2-METHOXYISOBUTYLISOCYANIDE)COPPER(I) TETRAFLUOROBORATE 1 MG/ML
32.8 INJECTION, POWDER, LYOPHILIZED, FOR SOLUTION INTRAVENOUS
Status: COMPLETED | OUTPATIENT
Start: 2025-04-01 | End: 2025-04-01

## 2025-04-01 RX ORDER — REGADENOSON 0.08 MG/ML
0.4 INJECTION, SOLUTION INTRAVENOUS
Status: COMPLETED | OUTPATIENT
Start: 2025-04-01 | End: 2025-04-01

## 2025-04-01 RX ADMIN — REGADENOSON 0.4 MG: 0.08 INJECTION, SOLUTION INTRAVENOUS at 07:49

## 2025-04-01 RX ADMIN — Medication 10.2 MILLICURIE: at 06:55

## 2025-04-01 RX ADMIN — Medication 32.8 MILLICURIE: at 07:50

## 2025-04-06 ENCOUNTER — APPOINTMENT (OUTPATIENT)
Dept: GENERAL RADIOLOGY | Age: 55
DRG: 287 | End: 2025-04-06
Payer: MEDICARE

## 2025-04-06 ENCOUNTER — APPOINTMENT (OUTPATIENT)
Dept: ULTRASOUND IMAGING | Age: 55
DRG: 287 | End: 2025-04-06
Payer: MEDICARE

## 2025-04-06 ENCOUNTER — APPOINTMENT (OUTPATIENT)
Dept: CT IMAGING | Age: 55
DRG: 287 | End: 2025-04-06
Payer: MEDICARE

## 2025-04-06 ENCOUNTER — HOSPITAL ENCOUNTER (INPATIENT)
Age: 55
LOS: 2 days | Discharge: HOME | DRG: 287 | End: 2025-04-08
Attending: STUDENT IN AN ORGANIZED HEALTH CARE EDUCATION/TRAINING PROGRAM | Admitting: STUDENT IN AN ORGANIZED HEALTH CARE EDUCATION/TRAINING PROGRAM
Payer: MEDICARE

## 2025-04-06 DIAGNOSIS — N18.31 STAGE 3A CHRONIC KIDNEY DISEASE (HCC): ICD-10-CM

## 2025-04-06 DIAGNOSIS — R07.9 CHEST PAIN, UNSPECIFIED TYPE: ICD-10-CM

## 2025-04-06 DIAGNOSIS — N17.9 ACUTE KIDNEY INJURY: ICD-10-CM

## 2025-04-06 DIAGNOSIS — R74.8 ELEVATED LIPASE: ICD-10-CM

## 2025-04-06 DIAGNOSIS — I25.9 CHEST PAIN DUE TO MYOCARDIAL ISCHEMIA, UNSPECIFIED ISCHEMIC CHEST PAIN TYPE: Primary | ICD-10-CM

## 2025-04-06 DIAGNOSIS — R94.39 ABNORMAL STRESS TEST: ICD-10-CM

## 2025-04-06 DIAGNOSIS — R79.89 ELEVATED TROPONIN: ICD-10-CM

## 2025-04-06 LAB
ALBUMIN SERPL BCG-MCNC: 4.4 G/DL (ref 3.4–4.9)
ALP SERPL-CCNC: 95 U/L (ref 40–129)
ALT SERPL W/O P-5'-P-CCNC: 21 U/L (ref 10–50)
ANION GAP SERPL CALC-SCNC: 13 MEQ/L (ref 8–16)
APTT PPP: 32.9 SECONDS (ref 22–38)
AST SERPL-CCNC: 33 U/L (ref 10–50)
BASOPHILS ABSOLUTE: 0.1 THOU/MM3 (ref 0–0.1)
BASOPHILS NFR BLD AUTO: 0.5 %
BILIRUB SERPL-MCNC: 0.8 MG/DL (ref 0.3–1.2)
BUN SERPL-MCNC: 27 MG/DL (ref 8–23)
CALCIUM SERPL-MCNC: 10 MG/DL (ref 8.6–10)
CHLORIDE 24H UR-SRATE: 31 MEQ/L
CHLORIDE SERPL-SCNC: 98 MEQ/L (ref 98–111)
CO2 SERPL-SCNC: 26 MEQ/L (ref 22–29)
CREAT SERPL-MCNC: 1.3 MG/DL (ref 0.7–1.2)
CREAT UR-MCNC: 79.4 MG/DL
DEPRECATED RDW RBC AUTO: 47.4 FL (ref 35–45)
EKG ATRIAL RATE: 97 BPM
EKG P AXIS: 49 DEGREES
EKG P-R INTERVAL: 222 MS
EKG Q-T INTERVAL: 356 MS
EKG QRS DURATION: 74 MS
EKG QTC CALCULATION (BAZETT): 452 MS
EKG R AXIS: 19 DEGREES
EKG T AXIS: 45 DEGREES
EKG VENTRICULAR RATE: 97 BPM
EOSINOPHIL NFR BLD AUTO: 2.9 %
EOSINOPHILS ABSOLUTE: 0.3 THOU/MM3 (ref 0–0.4)
ERYTHROCYTE [DISTWIDTH] IN BLOOD BY AUTOMATED COUNT: 14.2 % (ref 11.5–14.5)
GFR SERPL CREATININE-BSD FRML MDRD: 64 ML/MIN/1.73M2
GLUCOSE BLD STRIP.AUTO-MCNC: 140 MG/DL (ref 70–108)
GLUCOSE BLD STRIP.AUTO-MCNC: 141 MG/DL (ref 70–108)
GLUCOSE SERPL-MCNC: 144 MG/DL (ref 74–109)
HCT VFR BLD AUTO: 46.6 % (ref 42–52)
HEPARIN UNFRACTIONATED: 0.14 U/ML (ref 0.3–0.7)
HEPARIN UNFRACTIONATED: < 0.04 U/ML (ref 0.3–0.7)
HGB BLD-MCNC: 16 GM/DL (ref 14–18)
IMM GRANULOCYTES # BLD AUTO: 0.06 THOU/MM3 (ref 0–0.07)
IMM GRANULOCYTES NFR BLD AUTO: 0.5 %
INR PPP: 1.03 (ref 0.85–1.13)
LIPASE SERPL-CCNC: 186 U/L (ref 13–60)
LYMPHOCYTES ABSOLUTE: 2.6 THOU/MM3 (ref 1–4.8)
LYMPHOCYTES NFR BLD AUTO: 21.7 %
MCH RBC QN AUTO: 31.2 PG (ref 26–33)
MCHC RBC AUTO-ENTMCNC: 34.3 GM/DL (ref 32.2–35.5)
MCV RBC AUTO: 90.8 FL (ref 80–94)
MONOCYTES ABSOLUTE: 1 THOU/MM3 (ref 0.4–1.3)
MONOCYTES NFR BLD AUTO: 8 %
NEUTROPHILS ABSOLUTE: 7.9 THOU/MM3 (ref 1.8–7.7)
NEUTROPHILS NFR BLD AUTO: 66.4 %
NRBC BLD AUTO-RTO: 0 /100 WBC
NT-PROBNP SERPL IA-MCNC: < 36 PG/ML (ref 0–124)
OSMOLALITY SERPL CALC.SUM OF ELEC: 281.5 MOSMOL/KG (ref 275–300)
PLATELET # BLD AUTO: 235 THOU/MM3 (ref 130–400)
PMV BLD AUTO: 9.7 FL (ref 9.4–12.4)
POTASSIUM SERPL-SCNC: 4.5 MEQ/L (ref 3.5–5.2)
POTASSIUM UR-SCNC: 24.7 MEQ/L
PROT SERPL-MCNC: 8.1 G/DL (ref 6.4–8.3)
RBC # BLD AUTO: 5.13 MILL/MM3 (ref 4.7–6.1)
REASON FOR REJECTION: NORMAL
REJECTED TEST: NORMAL
SCAN OF BLOOD SMEAR: NORMAL
SODIUM SERPL-SCNC: 137 MEQ/L (ref 135–145)
SODIUM UR-SCNC: 46 MEQ/L
TROPONIN, HIGH SENSITIVITY: 44 NG/L (ref 0–12)
TROPONIN, HIGH SENSITIVITY: 45 NG/L (ref 0–12)
TROPONIN, HIGH SENSITIVITY: 50 NG/L (ref 0–12)
UUN 24H UR-MCNC: 612 MG/DL
WBC # BLD AUTO: 11.9 THOU/MM3 (ref 4.8–10.8)

## 2025-04-06 PROCEDURE — 96374 THER/PROPH/DIAG INJ IV PUSH: CPT

## 2025-04-06 PROCEDURE — 82570 ASSAY OF URINE CREATININE: CPT

## 2025-04-06 PROCEDURE — 74177 CT ABD & PELVIS W/CONTRAST: CPT

## 2025-04-06 PROCEDURE — 93005 ELECTROCARDIOGRAM TRACING: CPT

## 2025-04-06 PROCEDURE — 2060000000 HC ICU INTERMEDIATE R&B

## 2025-04-06 PROCEDURE — 6360000002 HC RX W HCPCS

## 2025-04-06 PROCEDURE — 85025 COMPLETE CBC W/AUTO DIFF WBC: CPT

## 2025-04-06 PROCEDURE — 99285 EMERGENCY DEPT VISIT HI MDM: CPT

## 2025-04-06 PROCEDURE — 82948 REAGENT STRIP/BLOOD GLUCOSE: CPT

## 2025-04-06 PROCEDURE — 6370000000 HC RX 637 (ALT 250 FOR IP): Performed by: STUDENT IN AN ORGANIZED HEALTH CARE EDUCATION/TRAINING PROGRAM

## 2025-04-06 PROCEDURE — 85520 HEPARIN ASSAY: CPT

## 2025-04-06 PROCEDURE — 85610 PROTHROMBIN TIME: CPT

## 2025-04-06 PROCEDURE — 99223 1ST HOSP IP/OBS HIGH 75: CPT | Performed by: INTERNAL MEDICINE

## 2025-04-06 PROCEDURE — 71046 X-RAY EXAM CHEST 2 VIEWS: CPT

## 2025-04-06 PROCEDURE — 36415 COLL VENOUS BLD VENIPUNCTURE: CPT

## 2025-04-06 PROCEDURE — 84300 ASSAY OF URINE SODIUM: CPT

## 2025-04-06 PROCEDURE — 80053 COMPREHEN METABOLIC PANEL: CPT

## 2025-04-06 PROCEDURE — 85730 THROMBOPLASTIN TIME PARTIAL: CPT

## 2025-04-06 PROCEDURE — 6360000004 HC RX CONTRAST MEDICATION

## 2025-04-06 PROCEDURE — 99223 1ST HOSP IP/OBS HIGH 75: CPT | Performed by: STUDENT IN AN ORGANIZED HEALTH CARE EDUCATION/TRAINING PROGRAM

## 2025-04-06 PROCEDURE — 6360000002 HC RX W HCPCS: Performed by: STUDENT IN AN ORGANIZED HEALTH CARE EDUCATION/TRAINING PROGRAM

## 2025-04-06 PROCEDURE — 6370000000 HC RX 637 (ALT 250 FOR IP)

## 2025-04-06 PROCEDURE — 84484 ASSAY OF TROPONIN QUANT: CPT

## 2025-04-06 PROCEDURE — 84133 ASSAY OF URINE POTASSIUM: CPT

## 2025-04-06 PROCEDURE — 84540 ASSAY OF URINE/UREA-N: CPT

## 2025-04-06 PROCEDURE — 76770 US EXAM ABDO BACK WALL COMP: CPT

## 2025-04-06 PROCEDURE — 93010 ELECTROCARDIOGRAM REPORT: CPT | Performed by: INTERNAL MEDICINE

## 2025-04-06 PROCEDURE — 83880 ASSAY OF NATRIURETIC PEPTIDE: CPT

## 2025-04-06 PROCEDURE — 82436 ASSAY OF URINE CHLORIDE: CPT

## 2025-04-06 PROCEDURE — 83690 ASSAY OF LIPASE: CPT

## 2025-04-06 RX ORDER — METOPROLOL TARTRATE 25 MG/1
25 TABLET, FILM COATED ORAL 2 TIMES DAILY
Status: DISCONTINUED | OUTPATIENT
Start: 2025-04-06 | End: 2025-04-08 | Stop reason: HOSPADM

## 2025-04-06 RX ORDER — PANTOPRAZOLE SODIUM 40 MG/1
40 TABLET, DELAYED RELEASE ORAL
Status: DISCONTINUED | OUTPATIENT
Start: 2025-04-07 | End: 2025-04-08 | Stop reason: HOSPADM

## 2025-04-06 RX ORDER — ACETAMINOPHEN 650 MG/1
650 SUPPOSITORY RECTAL EVERY 6 HOURS PRN
Status: DISCONTINUED | OUTPATIENT
Start: 2025-04-06 | End: 2025-04-08 | Stop reason: HOSPADM

## 2025-04-06 RX ORDER — DEXTROSE MONOHYDRATE 100 MG/ML
INJECTION, SOLUTION INTRAVENOUS CONTINUOUS PRN
Status: DISCONTINUED | OUTPATIENT
Start: 2025-04-06 | End: 2025-04-08 | Stop reason: HOSPADM

## 2025-04-06 RX ORDER — MAGNESIUM SULFATE IN WATER 40 MG/ML
2000 INJECTION, SOLUTION INTRAVENOUS PRN
Status: DISCONTINUED | OUTPATIENT
Start: 2025-04-06 | End: 2025-04-08 | Stop reason: HOSPADM

## 2025-04-06 RX ORDER — ASPIRIN 81 MG/1
324 TABLET, CHEWABLE ORAL ONCE
Status: COMPLETED | OUTPATIENT
Start: 2025-04-06 | End: 2025-04-06

## 2025-04-06 RX ORDER — ASPIRIN 81 MG/1
TABLET, CHEWABLE ORAL
Status: COMPLETED
Start: 2025-04-06 | End: 2025-04-06

## 2025-04-06 RX ORDER — INSULIN LISPRO 100 [IU]/ML
15 INJECTION, SOLUTION INTRAVENOUS; SUBCUTANEOUS
Status: DISCONTINUED | OUTPATIENT
Start: 2025-04-06 | End: 2025-04-06

## 2025-04-06 RX ORDER — INSULIN GLARGINE 100 [IU]/ML
25 INJECTION, SOLUTION SUBCUTANEOUS 2 TIMES DAILY
Status: DISCONTINUED | OUTPATIENT
Start: 2025-04-06 | End: 2025-04-08 | Stop reason: HOSPADM

## 2025-04-06 RX ORDER — SODIUM CHLORIDE 0.9 % (FLUSH) 0.9 %
5-40 SYRINGE (ML) INJECTION PRN
Status: DISCONTINUED | OUTPATIENT
Start: 2025-04-06 | End: 2025-04-08 | Stop reason: HOSPADM

## 2025-04-06 RX ORDER — VITAMIN B COMPLEX
1000 TABLET ORAL DAILY
Status: DISCONTINUED | OUTPATIENT
Start: 2025-04-06 | End: 2025-04-08 | Stop reason: HOSPADM

## 2025-04-06 RX ORDER — ONDANSETRON 4 MG/1
4 TABLET, ORALLY DISINTEGRATING ORAL EVERY 8 HOURS PRN
Status: DISCONTINUED | OUTPATIENT
Start: 2025-04-06 | End: 2025-04-08 | Stop reason: HOSPADM

## 2025-04-06 RX ORDER — HEPARIN SODIUM 10000 [USP'U]/100ML
5-30 INJECTION, SOLUTION INTRAVENOUS CONTINUOUS
Status: DISCONTINUED | OUTPATIENT
Start: 2025-04-06 | End: 2025-04-08 | Stop reason: HOSPADM

## 2025-04-06 RX ORDER — ASPIRIN 81 MG/1
81 TABLET ORAL DAILY
Status: DISCONTINUED | OUTPATIENT
Start: 2025-04-07 | End: 2025-04-08 | Stop reason: HOSPADM

## 2025-04-06 RX ORDER — HEPARIN SODIUM 1000 [USP'U]/ML
4000 INJECTION, SOLUTION INTRAVENOUS; SUBCUTANEOUS PRN
Status: DISCONTINUED | OUTPATIENT
Start: 2025-04-06 | End: 2025-04-08 | Stop reason: HOSPADM

## 2025-04-06 RX ORDER — GLUCAGON 1 MG/ML
1 KIT INJECTION PRN
Status: DISCONTINUED | OUTPATIENT
Start: 2025-04-06 | End: 2025-04-08 | Stop reason: HOSPADM

## 2025-04-06 RX ORDER — SERTRALINE HYDROCHLORIDE 100 MG/1
100 TABLET, FILM COATED ORAL DAILY
Status: DISCONTINUED | OUTPATIENT
Start: 2025-04-06 | End: 2025-04-07

## 2025-04-06 RX ORDER — HEPARIN SODIUM 1000 [USP'U]/ML
2000 INJECTION, SOLUTION INTRAVENOUS; SUBCUTANEOUS PRN
Status: DISCONTINUED | OUTPATIENT
Start: 2025-04-06 | End: 2025-04-08 | Stop reason: HOSPADM

## 2025-04-06 RX ORDER — ARIPIPRAZOLE 5 MG/1
5 TABLET ORAL DAILY
Status: DISCONTINUED | OUTPATIENT
Start: 2025-04-06 | End: 2025-04-07

## 2025-04-06 RX ORDER — ACETAMINOPHEN 325 MG/1
650 TABLET ORAL EVERY 6 HOURS PRN
Status: DISCONTINUED | OUTPATIENT
Start: 2025-04-06 | End: 2025-04-07 | Stop reason: SDUPTHER

## 2025-04-06 RX ORDER — INSULIN LISPRO 100 [IU]/ML
0-8 INJECTION, SOLUTION INTRAVENOUS; SUBCUTANEOUS
Status: DISCONTINUED | OUTPATIENT
Start: 2025-04-06 | End: 2025-04-08 | Stop reason: HOSPADM

## 2025-04-06 RX ORDER — POTASSIUM CHLORIDE 1500 MG/1
40 TABLET, EXTENDED RELEASE ORAL PRN
Status: DISCONTINUED | OUTPATIENT
Start: 2025-04-06 | End: 2025-04-08 | Stop reason: HOSPADM

## 2025-04-06 RX ORDER — ONDANSETRON 2 MG/ML
4 INJECTION INTRAMUSCULAR; INTRAVENOUS EVERY 6 HOURS PRN
Status: DISCONTINUED | OUTPATIENT
Start: 2025-04-06 | End: 2025-04-08 | Stop reason: HOSPADM

## 2025-04-06 RX ORDER — PREGABALIN 75 MG/1
75 CAPSULE ORAL 3 TIMES DAILY
Status: DISCONTINUED | OUTPATIENT
Start: 2025-04-06 | End: 2025-04-07

## 2025-04-06 RX ORDER — POTASSIUM CHLORIDE 7.45 MG/ML
10 INJECTION INTRAVENOUS PRN
Status: DISCONTINUED | OUTPATIENT
Start: 2025-04-06 | End: 2025-04-08 | Stop reason: HOSPADM

## 2025-04-06 RX ORDER — ASCORBIC ACID 500 MG
500 TABLET ORAL DAILY
Status: DISCONTINUED | OUTPATIENT
Start: 2025-04-06 | End: 2025-04-08 | Stop reason: HOSPADM

## 2025-04-06 RX ORDER — IOPAMIDOL 755 MG/ML
80 INJECTION, SOLUTION INTRAVASCULAR
Status: COMPLETED | OUTPATIENT
Start: 2025-04-06 | End: 2025-04-06

## 2025-04-06 RX ORDER — SODIUM CHLORIDE 0.9 % (FLUSH) 0.9 %
5-40 SYRINGE (ML) INJECTION EVERY 12 HOURS SCHEDULED
Status: DISCONTINUED | OUTPATIENT
Start: 2025-04-06 | End: 2025-04-08 | Stop reason: HOSPADM

## 2025-04-06 RX ORDER — SODIUM CHLORIDE 9 MG/ML
INJECTION, SOLUTION INTRAVENOUS PRN
Status: DISCONTINUED | OUTPATIENT
Start: 2025-04-06 | End: 2025-04-07 | Stop reason: SDUPTHER

## 2025-04-06 RX ORDER — ATORVASTATIN CALCIUM 80 MG/1
80 TABLET, FILM COATED ORAL NIGHTLY
Status: DISCONTINUED | OUTPATIENT
Start: 2025-04-06 | End: 2025-04-08 | Stop reason: HOSPADM

## 2025-04-06 RX ORDER — CYCLOBENZAPRINE HCL 10 MG
10 TABLET ORAL 3 TIMES DAILY PRN
Status: DISCONTINUED | OUTPATIENT
Start: 2025-04-06 | End: 2025-04-08 | Stop reason: HOSPADM

## 2025-04-06 RX ORDER — POLYETHYLENE GLYCOL 3350 17 G/17G
17 POWDER, FOR SOLUTION ORAL DAILY PRN
Status: DISCONTINUED | OUTPATIENT
Start: 2025-04-06 | End: 2025-04-08 | Stop reason: HOSPADM

## 2025-04-06 RX ORDER — NITROGLYCERIN 20 MG/100ML
5-200 INJECTION INTRAVENOUS CONTINUOUS
Status: DISCONTINUED | OUTPATIENT
Start: 2025-04-06 | End: 2025-04-08 | Stop reason: HOSPADM

## 2025-04-06 RX ORDER — RANOLAZINE 500 MG/1
500 TABLET, EXTENDED RELEASE ORAL 2 TIMES DAILY
Status: DISCONTINUED | OUTPATIENT
Start: 2025-04-06 | End: 2025-04-08 | Stop reason: HOSPADM

## 2025-04-06 RX ORDER — HEPARIN SODIUM 1000 [USP'U]/ML
4000 INJECTION, SOLUTION INTRAVENOUS; SUBCUTANEOUS ONCE
Status: COMPLETED | OUTPATIENT
Start: 2025-04-06 | End: 2025-04-06

## 2025-04-06 RX ORDER — LEVOTHYROXINE SODIUM 50 UG/1
50 TABLET ORAL DAILY
Status: DISCONTINUED | OUTPATIENT
Start: 2025-04-07 | End: 2025-04-08 | Stop reason: HOSPADM

## 2025-04-06 RX ADMIN — IOPAMIDOL 80 ML: 755 INJECTION, SOLUTION INTRAVENOUS at 08:36

## 2025-04-06 RX ADMIN — METOPROLOL TARTRATE 25 MG: 25 TABLET, FILM COATED ORAL at 21:11

## 2025-04-06 RX ADMIN — ATORVASTATIN CALCIUM 80 MG: 80 TABLET, FILM COATED ORAL at 21:11

## 2025-04-06 RX ADMIN — ARIPIPRAZOLE 5 MG: 5 TABLET ORAL at 16:25

## 2025-04-06 RX ADMIN — SERTRALINE 100 MG: 100 TABLET, FILM COATED ORAL at 16:26

## 2025-04-06 RX ADMIN — ASPIRIN 324 MG: 81 TABLET, CHEWABLE ORAL at 08:01

## 2025-04-06 RX ADMIN — Medication 1000 UNITS: at 16:26

## 2025-04-06 RX ADMIN — EMPAGLIFLOZIN 25 MG: 25 TABLET, FILM COATED ORAL at 17:00

## 2025-04-06 RX ADMIN — ACETAMINOPHEN 650 MG: 325 TABLET ORAL at 10:57

## 2025-04-06 RX ADMIN — HEPARIN SODIUM 2000 UNITS: 1000 INJECTION INTRAVENOUS; SUBCUTANEOUS at 18:28

## 2025-04-06 RX ADMIN — NITROGLYCERIN 5 MCG/MIN: 20 INJECTION INTRAVENOUS at 07:18

## 2025-04-06 RX ADMIN — INSULIN GLARGINE 25 UNITS: 100 INJECTION, SOLUTION SUBCUTANEOUS at 21:11

## 2025-04-06 RX ADMIN — HEPARIN SODIUM 12 UNITS/KG/HR: 10000 INJECTION, SOLUTION INTRAVENOUS at 18:28

## 2025-04-06 RX ADMIN — PREGABALIN 75 MG: 75 CAPSULE ORAL at 16:27

## 2025-04-06 RX ADMIN — ACETAMINOPHEN 650 MG: 325 TABLET ORAL at 18:14

## 2025-04-06 RX ADMIN — RANOLAZINE 500 MG: 500 TABLET, EXTENDED RELEASE ORAL at 21:11

## 2025-04-06 RX ADMIN — HEPARIN SODIUM 4000 UNITS: 1000 INJECTION INTRAVENOUS; SUBCUTANEOUS at 09:59

## 2025-04-06 RX ADMIN — OXYCODONE HYDROCHLORIDE AND ACETAMINOPHEN 500 MG: 500 TABLET ORAL at 16:26

## 2025-04-06 RX ADMIN — PREGABALIN 75 MG: 75 CAPSULE ORAL at 21:11

## 2025-04-06 RX ADMIN — HEPARIN SODIUM 10 UNITS/KG/HR: 10000 INJECTION, SOLUTION INTRAVENOUS at 10:01

## 2025-04-06 ASSESSMENT — PAIN DESCRIPTION - LOCATION
LOCATION: CHEST

## 2025-04-06 ASSESSMENT — PAIN - FUNCTIONAL ASSESSMENT
PAIN_FUNCTIONAL_ASSESSMENT: ACTIVITIES ARE NOT PREVENTED
PAIN_FUNCTIONAL_ASSESSMENT: 0-10
PAIN_FUNCTIONAL_ASSESSMENT: ACTIVITIES ARE NOT PREVENTED

## 2025-04-06 ASSESSMENT — HEART SCORE: ECG: NORMAL

## 2025-04-06 ASSESSMENT — PAIN DESCRIPTION - DESCRIPTORS
DESCRIPTORS: PRESSURE;TIGHTNESS
DESCRIPTORS: TIGHTNESS

## 2025-04-06 ASSESSMENT — PAIN DESCRIPTION - FREQUENCY
FREQUENCY: INTERMITTENT
FREQUENCY: INTERMITTENT

## 2025-04-06 ASSESSMENT — PAIN SCALES - GENERAL
PAINLEVEL_OUTOF10: 4
PAINLEVEL_OUTOF10: 6
PAINLEVEL_OUTOF10: 4
PAINLEVEL_OUTOF10: 10
PAINLEVEL_OUTOF10: 3
PAINLEVEL_OUTOF10: 6

## 2025-04-06 ASSESSMENT — PAIN DESCRIPTION - ONSET
ONSET: ON-GOING
ONSET: ON-GOING

## 2025-04-06 ASSESSMENT — PAIN DESCRIPTION - ORIENTATION
ORIENTATION: RIGHT

## 2025-04-06 ASSESSMENT — PAIN DESCRIPTION - PAIN TYPE
TYPE: ACUTE PAIN
TYPE: ACUTE PAIN

## 2025-04-06 NOTE — CONSULTS
average, but that this time the benefit outweighs this risk.  Thus, a decision was made to proceed with cardiac catheterization with possible PCI as it it the recommended procedure for the patient.The indication, risks and benefits of the procedure and possible therapeutic consequences and alternatives were discussed with the patient. The patient was given the opportunity to ask questions and to have them answered to his/her satisfaction. Risks of the procedure include but are not limited to the following: Bleeding, hematoma including retroperitoneal hematoma, infection, pain and discomfort, injury to the aorta and other blood vessels, rhythm disturbance, low blood pressure, myocardial infarction, need for bypass surgery, stroke, kidney damage/failure, myocardial perforation, allergic reactions to sedatives/contrast material, loss of pulse/vascular compromise requiring surgery, aneurysm/pseudoaneurysm formation, possible loss of a limb/hand/leg, death. Alternatives to and omission of the suggested procedure were discussed. The patient also understands the need for DAPT if PCI is necessary. The patient had no further questions and wished to proceed.  Need to ensure compliance with medical regimen  If negative cath, add Ranexa/Imdur  Further recommendations based on results and clinical course    Thank you for allowing us to participate in the care of this patient.  Please do not hesitate to call us with questions.    Time spent reviewing notes, data, discussing with patient/family, and formulating plan with clinical documentation was approximately 80 minutes.     Electronically signed by Franklin Carrero MD on 4/6/2025 at 9:53 AM    Interventional Cardiology - [facility]

## 2025-04-06 NOTE — ED PROVIDER NOTES
recognition software. It may contain   minor errors which are inherent in voice recognition technology.**         Electronically signed by Dr. Sp Wang          LABS: (none if blank)  Labs Reviewed   CBC WITH AUTO DIFFERENTIAL - Abnormal; Notable for the following components:       Result Value    WBC 11.9 (*)     RDW-SD 47.4 (*)     Neutrophils Absolute 7.9 (*)     All other components within normal limits   COMPREHENSIVE METABOLIC PANEL - Abnormal; Notable for the following components:    Glucose 144 (*)     Creatinine 1.3 (*)     BUN 27 (*)     All other components within normal limits   TROPONIN - Abnormal; Notable for the following components:    Troponin, High Sensitivity 50 (*)     All other components within normal limits   LIPASE - Abnormal; Notable for the following components:    Lipase 186.0 (*)     All other components within normal limits   TROPONIN - Abnormal; Notable for the following components:    Troponin, High Sensitivity 45 (*)     All other components within normal limits   ANTI-XA, HEPARIN - Abnormal; Notable for the following components:    Heparin Unfractionated < 0.04 (*)     All other components within normal limits   ANTI-XA, HEPARIN - Abnormal; Notable for the following components:    Heparin Unfractionated 0.14 (*)     All other components within normal limits   TROPONIN - Abnormal; Notable for the following components:    Troponin, High Sensitivity 44 (*)     All other components within normal limits   POCT GLUCOSE - Abnormal; Notable for the following components:    POC Glucose 141 (*)     All other components within normal limits   BRAIN NATRIURETIC PEPTIDE   ANION GAP   GLOMERULAR FILTRATION RATE, ESTIMATED   OSMOLALITY   SCAN OF BLOOD SMEAR   APTT   PROTIME-INR   SPECIMEN REJECTION   ELECTROLYTES URINE RANDOM   CREATININE, RANDOM URINE   UREA NITROGEN, URINE   ANTI-XA, HEPARIN   ANTI-XA, HEPARIN   BASIC METABOLIC PANEL W/ REFLEX TO MG FOR LOW K   CBC   POCT GLUCOSE   POCT

## 2025-04-06 NOTE — ED TRIAGE NOTES
Pt reports to the ED from home due to chest pain. Pt reports chest pain lasting for the past three weeks. Pt rates pain 10/10 and describes pain as stabbing in center of chest. Pt states had stress test done last week. Pt reports received news that stress test was abnormal. Pt next appointment with cardiologist is tomorrow. Cardiologist informed pt if chest pain worsens come to ED. EKG complete. Cardiac monitoring in place. INT established.

## 2025-04-06 NOTE — H&P
swelling or tenderness. Normal tone. No abnormal movements.  Extremities: Patient has R AKA, L foot has scabbed lesion on third digit & base of fifth metatarsal, is clean, dry, nonerythematous, warm, no evidence of infection.  Skin: Warm and dry. No rashes or lesions.  Neurologic:  No focal sensory/motor deficits in the upper and lower extremities. Cranial nerves:  grossly non-focal 2-12.     Psychiatric: Alert and oriented, normal insight and thought content.   Capillary Refill: Brisk,< 3 seconds.  Peripheral Pulses: +2 palpable, equal bilaterally.       Medications Prior to Admission:   Prior to Admission Medications   Prescriptions Last Dose Informant Patient Reported? Taking?   ARIPiprazole (ABILIFY) 5 MG tablet   Yes No   Sig: Take 1 tablet by mouth daily   Blood Glucose Monitoring Suppl (FREESTYLE LITE) LIANNE   No No   Si Device by Does not apply route 1 time for 1 dose Freestyle glucose meter or meter covered by patient's insurance plan   Cholecalciferol (VITAMIN D) 25 MCG TABS   No No   Sig: Take 1 tablet by mouth daily   Continuous Glucose  (DEXCOM G7 ) LIANNE   No No   Sig: USE TO CHECK BLOOD GLUCOSE 4 TIMES A DAY   Continuous Glucose Sensor (DEXCOM G7 SENSOR) MISC   No No   Sig: Check blood sugars 4x/day   Dulaglutide (TRULICITY) 4.5 MG/0.5ML SOAJ   No No   Si.5 mg weekly sc   Insulin Glargine, 2 Unit Dial, (TOUJEO MAX SOLOSTAR) 300 UNIT/ML concentrated injection pen   No No   Sig: INJECT 30 UNITS IN THE MORNING, 30 UNITS IN THE EVENING. EVERY 10 DAYS INCREASE BY 5 UNITS BOTH TIMES, TO GET MORNING GLUCOSE 150-200.   Patient taking differently: INJECT 25 UNITS IN THE MORNING, 25 UNITS IN THE EVENING. EVERY 10 DAYS INCREASE BY 5 UNITS BOTH TIMES, TO GET MORNING GLUCOSE 150-200.   Insulin Pen Needle (TRUEPLUS PEN NEEDLES) 31G X 8 MM MISC   No No   Sig: USE AS DIRECTED   Lancets MISC   No No   Si each by Does not apply route 2 times daily Please fill lancets that are compatible with

## 2025-04-06 NOTE — ED NOTES
Pt transported to [facility] on cart in stable condition. Floor contacted before transport and spoke with Sravani.  
   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

## 2025-04-07 ENCOUNTER — APPOINTMENT (OUTPATIENT)
Age: 55
DRG: 287 | End: 2025-04-07
Payer: MEDICARE

## 2025-04-07 LAB
ALBUMIN SERPL BCG-MCNC: 4 G/DL (ref 3.4–4.9)
ALP SERPL-CCNC: 80 U/L (ref 40–129)
ALT SERPL W/O P-5'-P-CCNC: 17 U/L (ref 10–50)
ANION GAP SERPL CALC-SCNC: 10 MEQ/L (ref 8–16)
AST SERPL-CCNC: 30 U/L (ref 10–50)
BILIRUB CONJ SERPL-MCNC: 0.3 MG/DL (ref 0–0.2)
BILIRUB SERPL-MCNC: 0.7 MG/DL (ref 0.3–1.2)
BUN SERPL-MCNC: 25 MG/DL (ref 8–23)
CALCIUM SERPL-MCNC: 9.4 MG/DL (ref 8.6–10)
CHLORIDE SERPL-SCNC: 100 MEQ/L (ref 98–111)
CO2 SERPL-SCNC: 24 MEQ/L (ref 22–29)
CREAT SERPL-MCNC: 1.2 MG/DL (ref 0.7–1.2)
DEPRECATED RDW RBC AUTO: 47.8 FL (ref 35–45)
ECHO AV CUSP MM: 2.1 CM
ECHO BSA: 2.16 M2
ECHO BSA: 2.16 M2
ECHO LA DIAMETER INDEX: 1.44 CM/M2
ECHO LA DIAMETER: 3 CM
ECHO LV EF PHYSICIAN: 60 %
ECHO LV FRACTIONAL SHORTENING: 28 % (ref 28–44)
ECHO LV INTERNAL DIMENSION DIASTOLE INDEX: 2.07 CM/M2
ECHO LV INTERNAL DIMENSION DIASTOLIC: 4.3 CM (ref 4.2–5.9)
ECHO LV INTERNAL DIMENSION SYSTOLIC INDEX: 1.49 CM/M2
ECHO LV INTERNAL DIMENSION SYSTOLIC: 3.1 CM
ECHO LV IVSD: 1.2 CM (ref 0.6–1)
ECHO LV MASS 2D: 162.9 G (ref 88–224)
ECHO LV MASS INDEX 2D: 78.3 G/M2 (ref 49–115)
ECHO LV POSTERIOR WALL DIASTOLIC: 1 CM (ref 0.6–1)
ECHO LV RELATIVE WALL THICKNESS RATIO: 0.47
ECHO RV INTERNAL DIMENSION: 3.2 CM
ECHO RV TAPSE: 2.1 CM (ref 1.7–?)
ERYTHROCYTE [DISTWIDTH] IN BLOOD BY AUTOMATED COUNT: 14.1 % (ref 11.5–14.5)
GFR SERPL CREATININE-BSD FRML MDRD: 70 ML/MIN/1.73M2
GLUCOSE BLD STRIP.AUTO-MCNC: 113 MG/DL (ref 70–108)
GLUCOSE BLD STRIP.AUTO-MCNC: 118 MG/DL (ref 70–108)
GLUCOSE BLD STRIP.AUTO-MCNC: 140 MG/DL (ref 70–108)
GLUCOSE BLD STRIP.AUTO-MCNC: 227 MG/DL (ref 70–108)
GLUCOSE BLD STRIP.AUTO-MCNC: 94 MG/DL (ref 70–108)
GLUCOSE SERPL-MCNC: 151 MG/DL (ref 74–109)
HCT VFR BLD AUTO: 43.6 % (ref 42–52)
HEPARIN UNFRACTIONATED: 0.22 U/ML (ref 0.3–0.7)
HEPARIN UNFRACTIONATED: 0.31 U/ML (ref 0.3–0.7)
HEPARIN UNFRACTIONATED: 0.41 U/ML (ref 0.3–0.7)
HGB BLD-MCNC: 15 GM/DL (ref 14–18)
MCH RBC QN AUTO: 31.7 PG (ref 26–33)
MCHC RBC AUTO-ENTMCNC: 34.4 GM/DL (ref 32.2–35.5)
MCV RBC AUTO: 92.2 FL (ref 80–94)
PLATELET # BLD AUTO: 216 THOU/MM3 (ref 130–400)
PMV BLD AUTO: 9.4 FL (ref 9.4–12.4)
POTASSIUM SERPL-SCNC: 4.7 MEQ/L (ref 3.5–5.2)
PROT SERPL-MCNC: 7.4 G/DL (ref 6.4–8.3)
RBC # BLD AUTO: 4.73 MILL/MM3 (ref 4.7–6.1)
SODIUM SERPL-SCNC: 134 MEQ/L (ref 135–145)
WBC # BLD AUTO: 9.9 THOU/MM3 (ref 4.8–10.8)

## 2025-04-07 PROCEDURE — 4A023N7 MEASUREMENT OF CARDIAC SAMPLING AND PRESSURE, LEFT HEART, PERCUTANEOUS APPROACH: ICD-10-PCS | Performed by: INTERNAL MEDICINE

## 2025-04-07 PROCEDURE — 6360000004 HC RX CONTRAST MEDICATION: Performed by: INTERNAL MEDICINE

## 2025-04-07 PROCEDURE — 2709999900 HC NON-CHARGEABLE SUPPLY: Performed by: INTERNAL MEDICINE

## 2025-04-07 PROCEDURE — 36415 COLL VENOUS BLD VENIPUNCTURE: CPT

## 2025-04-07 PROCEDURE — 6360000002 HC RX W HCPCS

## 2025-04-07 PROCEDURE — 80076 HEPATIC FUNCTION PANEL: CPT

## 2025-04-07 PROCEDURE — 85027 COMPLETE CBC AUTOMATED: CPT

## 2025-04-07 PROCEDURE — B2111ZZ FLUOROSCOPY OF MULTIPLE CORONARY ARTERIES USING LOW OSMOLAR CONTRAST: ICD-10-PCS | Performed by: INTERNAL MEDICINE

## 2025-04-07 PROCEDURE — C1894 INTRO/SHEATH, NON-LASER: HCPCS | Performed by: INTERNAL MEDICINE

## 2025-04-07 PROCEDURE — 6360000002 HC RX W HCPCS: Performed by: STUDENT IN AN ORGANIZED HEALTH CARE EDUCATION/TRAINING PROGRAM

## 2025-04-07 PROCEDURE — 2060000000 HC ICU INTERMEDIATE R&B

## 2025-04-07 PROCEDURE — 6370000000 HC RX 637 (ALT 250 FOR IP): Performed by: STUDENT IN AN ORGANIZED HEALTH CARE EDUCATION/TRAINING PROGRAM

## 2025-04-07 PROCEDURE — 6360000002 HC RX W HCPCS: Performed by: INTERNAL MEDICINE

## 2025-04-07 PROCEDURE — 93458 L HRT ARTERY/VENTRICLE ANGIO: CPT | Performed by: INTERNAL MEDICINE

## 2025-04-07 PROCEDURE — 2500000003 HC RX 250 WO HCPCS: Performed by: STUDENT IN AN ORGANIZED HEALTH CARE EDUCATION/TRAINING PROGRAM

## 2025-04-07 PROCEDURE — B2151ZZ FLUOROSCOPY OF LEFT HEART USING LOW OSMOLAR CONTRAST: ICD-10-PCS | Performed by: INTERNAL MEDICINE

## 2025-04-07 PROCEDURE — 82948 REAGENT STRIP/BLOOD GLUCOSE: CPT

## 2025-04-07 PROCEDURE — 99152 MOD SED SAME PHYS/QHP 5/>YRS: CPT | Performed by: INTERNAL MEDICINE

## 2025-04-07 PROCEDURE — C8923 2D TTE W OR W/O FOL W/CON,CO: HCPCS

## 2025-04-07 PROCEDURE — 85520 HEPARIN ASSAY: CPT

## 2025-04-07 PROCEDURE — 2500000003 HC RX 250 WO HCPCS: Performed by: INTERNAL MEDICINE

## 2025-04-07 PROCEDURE — C1769 GUIDE WIRE: HCPCS | Performed by: INTERNAL MEDICINE

## 2025-04-07 PROCEDURE — 80048 BASIC METABOLIC PNL TOTAL CA: CPT

## 2025-04-07 RX ORDER — ATROPINE SULFATE 0.4 MG/ML
0.5 INJECTION, SOLUTION INTRAVENOUS
Status: DISCONTINUED | OUTPATIENT
Start: 2025-04-07 | End: 2025-04-08 | Stop reason: HOSPADM

## 2025-04-07 RX ORDER — SODIUM CHLORIDE 9 MG/ML
INJECTION, SOLUTION INTRAVENOUS CONTINUOUS
Status: ACTIVE | OUTPATIENT
Start: 2025-04-07 | End: 2025-04-07

## 2025-04-07 RX ORDER — FENTANYL CITRATE 50 UG/ML
INJECTION, SOLUTION INTRAMUSCULAR; INTRAVENOUS PRN
Status: DISCONTINUED | OUTPATIENT
Start: 2025-04-07 | End: 2025-04-07 | Stop reason: HOSPADM

## 2025-04-07 RX ORDER — MIDAZOLAM HYDROCHLORIDE 1 MG/ML
INJECTION, SOLUTION INTRAMUSCULAR; INTRAVENOUS PRN
Status: DISCONTINUED | OUTPATIENT
Start: 2025-04-07 | End: 2025-04-07 | Stop reason: HOSPADM

## 2025-04-07 RX ORDER — IOPAMIDOL 755 MG/ML
INJECTION, SOLUTION INTRAVASCULAR PRN
Status: DISCONTINUED | OUTPATIENT
Start: 2025-04-07 | End: 2025-04-07 | Stop reason: HOSPADM

## 2025-04-07 RX ORDER — SODIUM CHLORIDE 0.9 % (FLUSH) 0.9 %
5-40 SYRINGE (ML) INJECTION PRN
Status: DISCONTINUED | OUTPATIENT
Start: 2025-04-07 | End: 2025-04-08 | Stop reason: HOSPADM

## 2025-04-07 RX ORDER — SODIUM CHLORIDE 9 MG/ML
INJECTION, SOLUTION INTRAVENOUS PRN
Status: DISCONTINUED | OUTPATIENT
Start: 2025-04-07 | End: 2025-04-08 | Stop reason: HOSPADM

## 2025-04-07 RX ORDER — SODIUM CHLORIDE 0.9 % (FLUSH) 0.9 %
5-40 SYRINGE (ML) INJECTION EVERY 12 HOURS SCHEDULED
Status: DISCONTINUED | OUTPATIENT
Start: 2025-04-07 | End: 2025-04-08 | Stop reason: HOSPADM

## 2025-04-07 RX ORDER — ARIPIPRAZOLE 10 MG/1
10 TABLET ORAL DAILY
COMMUNITY

## 2025-04-07 RX ORDER — ACETAMINOPHEN 325 MG/1
650 TABLET ORAL EVERY 4 HOURS PRN
Status: DISCONTINUED | OUTPATIENT
Start: 2025-04-07 | End: 2025-04-08 | Stop reason: HOSPADM

## 2025-04-07 RX ORDER — LIDOCAINE HYDROCHLORIDE 20 MG/ML
INJECTION, SOLUTION EPIDURAL; INFILTRATION; INTRACAUDAL; PERINEURAL PRN
Status: DISCONTINUED | OUTPATIENT
Start: 2025-04-07 | End: 2025-04-07 | Stop reason: HOSPADM

## 2025-04-07 RX ADMIN — METOPROLOL TARTRATE 25 MG: 25 TABLET, FILM COATED ORAL at 09:21

## 2025-04-07 RX ADMIN — ASPIRIN 81 MG: 81 TABLET, COATED ORAL at 09:21

## 2025-04-07 RX ADMIN — HEPARIN SODIUM 14 UNITS/KG/HR: 10000 INJECTION, SOLUTION INTRAVENOUS at 07:29

## 2025-04-07 RX ADMIN — RANOLAZINE 500 MG: 500 TABLET, EXTENDED RELEASE ORAL at 09:21

## 2025-04-07 RX ADMIN — METOPROLOL TARTRATE 25 MG: 25 TABLET, FILM COATED ORAL at 20:09

## 2025-04-07 RX ADMIN — HEPARIN SODIUM 2000 UNITS: 1000 INJECTION INTRAVENOUS; SUBCUTANEOUS at 01:12

## 2025-04-07 RX ADMIN — OXYCODONE HYDROCHLORIDE AND ACETAMINOPHEN 500 MG: 500 TABLET ORAL at 09:21

## 2025-04-07 RX ADMIN — NITROGLYCERIN 15 MCG/MIN: 20 INJECTION INTRAVENOUS at 07:27

## 2025-04-07 RX ADMIN — INSULIN LISPRO 2 UNITS: 100 INJECTION, SOLUTION INTRAVENOUS; SUBCUTANEOUS at 22:48

## 2025-04-07 RX ADMIN — INSULIN GLARGINE 25 UNITS: 100 INJECTION, SOLUTION SUBCUTANEOUS at 22:48

## 2025-04-07 RX ADMIN — PREGABALIN 75 MG: 75 CAPSULE ORAL at 14:40

## 2025-04-07 RX ADMIN — SODIUM CHLORIDE, PRESERVATIVE FREE 10 ML: 5 INJECTION INTRAVENOUS at 20:09

## 2025-04-07 RX ADMIN — ARIPIPRAZOLE 5 MG: 5 TABLET ORAL at 09:21

## 2025-04-07 RX ADMIN — SULFUR HEXAFLUORIDE 2 ML: KIT at 13:54

## 2025-04-07 RX ADMIN — PREGABALIN 75 MG: 75 CAPSULE ORAL at 09:21

## 2025-04-07 RX ADMIN — LEVOTHYROXINE SODIUM 50 MCG: 0.05 TABLET ORAL at 05:39

## 2025-04-07 RX ADMIN — RANOLAZINE 500 MG: 500 TABLET, EXTENDED RELEASE ORAL at 20:09

## 2025-04-07 RX ADMIN — Medication 1000 UNITS: at 09:20

## 2025-04-07 RX ADMIN — ATORVASTATIN CALCIUM 80 MG: 80 TABLET, FILM COATED ORAL at 20:09

## 2025-04-07 RX ADMIN — SERTRALINE 100 MG: 100 TABLET, FILM COATED ORAL at 09:21

## 2025-04-07 RX ADMIN — EMPAGLIFLOZIN 25 MG: 25 TABLET, FILM COATED ORAL at 09:21

## 2025-04-07 RX ADMIN — PANTOPRAZOLE SODIUM 40 MG: 40 TABLET, DELAYED RELEASE ORAL at 05:39

## 2025-04-07 ASSESSMENT — PAIN DESCRIPTION - DESCRIPTORS: DESCRIPTORS: TIGHTNESS

## 2025-04-07 ASSESSMENT — PAIN DESCRIPTION - PAIN TYPE: TYPE: ACUTE PAIN

## 2025-04-07 ASSESSMENT — PAIN DESCRIPTION - FREQUENCY: FREQUENCY: INTERMITTENT

## 2025-04-07 ASSESSMENT — PAIN DESCRIPTION - ONSET: ONSET: ON-GOING

## 2025-04-07 ASSESSMENT — PAIN DESCRIPTION - LOCATION
LOCATION: CHEST
LOCATION: CHEST

## 2025-04-07 ASSESSMENT — PAIN SCALES - GENERAL
PAINLEVEL_OUTOF10: 2
PAINLEVEL_OUTOF10: 4

## 2025-04-07 ASSESSMENT — PAIN DESCRIPTION - ORIENTATION: ORIENTATION: RIGHT

## 2025-04-07 ASSESSMENT — PAIN - FUNCTIONAL ASSESSMENT: PAIN_FUNCTIONAL_ASSESSMENT: ACTIVITIES ARE NOT PREVENTED

## 2025-04-07 NOTE — CARE COORDINATION
04/07/25 0825   Readmission Assessment   Number of Days since last admission? 8-30 days   Previous Disposition Other (comment)  (friends)   Who is being Interviewed Patient   What was the patient's/caregiver's perception as to why they think they needed to return back to the hospital? Other (Comment)  (CP)   Did you visit your Primary Care Physician after you left the hospital, before you returned this time? No   Why weren't you able to visit your PCP? Other (Comment)  (readmitted too soon)   Did you see a specialist, such as Cardiac, Pulmonary, Orthopedic Physician, etc. after you left the hospital? Yes   Who advised the patient to return to the hospital? Self-referral   Does the patient report anything that got in the way of taking their medications? No   In our efforts to provide the best possible care to you and others like you, can you think of anything that we could have done to help you after you left the hospital the first time, so that you might not have needed to return so soon? Other (Comment)  ((+) Stress Test last admit, \"they did not focus on the problem and here I am\"; plans cardiac cath today)

## 2025-04-07 NOTE — BRIEF OP NOTE
[facility]  Sedation/Analgesia Post Sedation Record        Pt Name: Jamie Spangler  MRN: [id number]  YOB: 1970  Procedure Performed By: Hakan Baugh MD MD, EMMA, JEREMY, TONA  Primary Care Physician: Phuc Ortega MD    POST-PROCEDURE    Sedation/Anesthesia:  Local Anesthesia and IV Conscious Sedation with continuous O2 monitoring    Estimated Blood Loss: 10 cc     Specimens Removed:  [x]None []Other:      Disposition of Specimen:  []Pathology []Other        Complications:   [x]None Immediate []Other:       Procedure performed: Left heart cath    Post Procedure Diagnosis/Findings:  Non-obstructive Coronary Artery Disease        Recommendations:    Medical treatment  Routine post-cath care.               Hakan Baugh MD MD, EMMA, JEREMY, TONA  Electronically signed 4/7/2025 at 3:26 PM

## 2025-04-07 NOTE — PALLIATIVE CARE
Follow Up / Progress Note        Patient:   Jamie Spangler  YOB: 1970  Age:  55 y.o.  Room:  [facility]09/009-  MRN:  [id number]               Plan/Follow-Up:  Palliative care eval received. Patient scheduled for cardiac cath today. Will follow post cath.          Electronically signed by Masood Desai RN on 4/7/2025 at 12:26 PM             Palliative Care Office: [phone]

## 2025-04-07 NOTE — CARE COORDINATION
Case Management Assessment Initial Evaluation    Date/Time of Evaluation: 4/7/2025 9:07 AM  Assessment Completed by: Celio Wills RN    If patient is discharged prior to next notation, then this note serves as note for discharge by case management.    Patient Name: Jamie Spangler                   YOB: 1970  Diagnosis: Chest pain [R07.9]  Elevated troponin [R79.89]  Acute kidney injury [N17.9]  Elevated lipase [R74.8]  Chest pain, unspecified type [R07.9]  Chest pain due to myocardial ischemia, unspecified ischemic chest pain type [I25.9]                   Date / Time: 4/6/2025  6:32 AM  [facility]: [facility]     Patient Admission Status: Inpatient   Readmission Risk Low 0-14, Mod 15-19), High > 20: Readmission Risk Score: 20.1    Current PCP: Phuc Ortega MD  Health Care Decision Makers:   Primary Decision Maker: Frantz Rand - Aunt/Uncle - [phone]    Additional Case Management Notes:   ELDA/CP  PMH: Bipolar, CHF, COPD, DM, PCI, Marijuana Use, Suicidal Ideation, Right AKA, Abnormal Stress Test  Heparin Gtt  Ntg Gtt  Await ECHO  Await Cardiac Cath  Procedures:   4/7 Cardiac Cath planned  4/7 ECHO pending    Patient Goals/Plan/Treatment Preferences: plans home w friends, has electric WC, walker, ramped entrance, uses [employer] transport, uncle/POA Frantz, current [employer]    4/8/25, 2:19 PM EDT    Patient goals/plan/ treatment preferences discussed by  and .  Patient goals/plan/ treatment preferences reviewed with patient/ family.  Patient/ family verbalize understanding of discharge plan and are in agreement with goal/plan/treatment preferences.  Understanding was demonstrated using the teach back method.  AVS provided by RN at time of discharge, which includes all necessary medical information pertaining to the patients current course of illness, treatment, post-discharge goals of care, and treatment preferences.     Services At/After Discharge:

## 2025-04-08 VITALS
TEMPERATURE: 97.8 F | RESPIRATION RATE: 18 BRPM | SYSTOLIC BLOOD PRESSURE: 129 MMHG | HEART RATE: 75 BPM | OXYGEN SATURATION: 96 % | DIASTOLIC BLOOD PRESSURE: 63 MMHG | WEIGHT: 220 LBS | BODY MASS INDEX: 35.36 KG/M2 | HEIGHT: 66 IN

## 2025-04-08 LAB
ANION GAP SERPL CALC-SCNC: 11 MEQ/L (ref 8–16)
BUN SERPL-MCNC: 22 MG/DL (ref 8–23)
CALCIUM SERPL-MCNC: 9.7 MG/DL (ref 8.6–10)
CHLORIDE SERPL-SCNC: 103 MEQ/L (ref 98–111)
CO2 SERPL-SCNC: 26 MEQ/L (ref 22–29)
CREAT SERPL-MCNC: 1.3 MG/DL (ref 0.7–1.2)
DEPRECATED RDW RBC AUTO: 47.8 FL (ref 35–45)
ERYTHROCYTE [DISTWIDTH] IN BLOOD BY AUTOMATED COUNT: 14.2 % (ref 11.5–14.5)
GFR SERPL CREATININE-BSD FRML MDRD: 64 ML/MIN/1.73M2
GLUCOSE BLD STRIP.AUTO-MCNC: 125 MG/DL (ref 70–108)
GLUCOSE BLD STRIP.AUTO-MCNC: 173 MG/DL (ref 70–108)
GLUCOSE SERPL-MCNC: 92 MG/DL (ref 74–109)
HCT VFR BLD AUTO: 45 % (ref 42–52)
HGB BLD-MCNC: 15.4 GM/DL (ref 14–18)
MCH RBC QN AUTO: 31.2 PG (ref 26–33)
MCHC RBC AUTO-ENTMCNC: 34.2 GM/DL (ref 32.2–35.5)
MCV RBC AUTO: 91.1 FL (ref 80–94)
PLATELET # BLD AUTO: 208 THOU/MM3 (ref 130–400)
PMV BLD AUTO: 9.5 FL (ref 9.4–12.4)
POTASSIUM SERPL-SCNC: 4.9 MEQ/L (ref 3.5–5.2)
RBC # BLD AUTO: 4.94 MILL/MM3 (ref 4.7–6.1)
SODIUM SERPL-SCNC: 140 MEQ/L (ref 135–145)
WBC # BLD AUTO: 10.2 THOU/MM3 (ref 4.8–10.8)

## 2025-04-08 PROCEDURE — 85027 COMPLETE CBC AUTOMATED: CPT

## 2025-04-08 PROCEDURE — 6370000000 HC RX 637 (ALT 250 FOR IP): Performed by: STUDENT IN AN ORGANIZED HEALTH CARE EDUCATION/TRAINING PROGRAM

## 2025-04-08 PROCEDURE — 36415 COLL VENOUS BLD VENIPUNCTURE: CPT

## 2025-04-08 PROCEDURE — 80048 BASIC METABOLIC PNL TOTAL CA: CPT

## 2025-04-08 PROCEDURE — 2500000003 HC RX 250 WO HCPCS: Performed by: INTERNAL MEDICINE

## 2025-04-08 PROCEDURE — 82948 REAGENT STRIP/BLOOD GLUCOSE: CPT

## 2025-04-08 PROCEDURE — 2500000003 HC RX 250 WO HCPCS: Performed by: STUDENT IN AN ORGANIZED HEALTH CARE EDUCATION/TRAINING PROGRAM

## 2025-04-08 PROCEDURE — 2580000003 HC RX 258

## 2025-04-08 RX ORDER — SODIUM CHLORIDE 9 MG/ML
INJECTION, SOLUTION INTRAVENOUS CONTINUOUS
Status: ACTIVE | OUTPATIENT
Start: 2025-04-08 | End: 2025-04-08

## 2025-04-08 RX ADMIN — INSULIN GLARGINE 25 UNITS: 100 INJECTION, SOLUTION SUBCUTANEOUS at 07:57

## 2025-04-08 RX ADMIN — PANTOPRAZOLE SODIUM 40 MG: 40 TABLET, DELAYED RELEASE ORAL at 05:29

## 2025-04-08 RX ADMIN — SODIUM CHLORIDE, PRESERVATIVE FREE 10 ML: 5 INJECTION INTRAVENOUS at 07:58

## 2025-04-08 RX ADMIN — SODIUM CHLORIDE: 0.9 INJECTION, SOLUTION INTRAVENOUS at 09:32

## 2025-04-08 RX ADMIN — EMPAGLIFLOZIN 25 MG: 25 TABLET, FILM COATED ORAL at 07:57

## 2025-04-08 RX ADMIN — RANOLAZINE 500 MG: 500 TABLET, EXTENDED RELEASE ORAL at 07:56

## 2025-04-08 RX ADMIN — LEVOTHYROXINE SODIUM 50 MCG: 0.05 TABLET ORAL at 05:29

## 2025-04-08 RX ADMIN — Medication 1000 UNITS: at 07:56

## 2025-04-08 RX ADMIN — OXYCODONE HYDROCHLORIDE AND ACETAMINOPHEN 500 MG: 500 TABLET ORAL at 07:57

## 2025-04-08 RX ADMIN — ASPIRIN 81 MG: 81 TABLET, COATED ORAL at 07:57

## 2025-04-08 RX ADMIN — SODIUM CHLORIDE, PRESERVATIVE FREE 10 ML: 5 INJECTION INTRAVENOUS at 07:57

## 2025-04-08 RX ADMIN — METOPROLOL TARTRATE 25 MG: 25 TABLET, FILM COATED ORAL at 07:57

## 2025-04-08 NOTE — DISCHARGE INSTRUCTIONS
- Follow up with PCP in one week or sooner  - Obtain labs including BMP to monitor kidney function and CBC before appointment with PCP

## 2025-04-08 NOTE — PLAN OF CARE
Problem: Chronic Conditions and Co-morbidities  Goal: Patient's chronic conditions and co-morbidity symptoms are monitored and maintained or improved  4/8/2025 1347 by Meghna De La Rosa RN  Outcome: Completed  Flowsheets (Taken 4/8/2025 0745)  Care Plan - Patient's Chronic Conditions and Co-Morbidity Symptoms are Monitored and Maintained or Improved:   Monitor and assess patient's chronic conditions and comorbid symptoms for stability, deterioration, or improvement   Collaborate with multidisciplinary team to address chronic and comorbid conditions and prevent exacerbation or deterioration  4/8/2025 0000 by Justyna Briones RN  Outcome: Progressing  Flowsheets (Taken 4/8/2025 0000)  Care Plan - Patient's Chronic Conditions and Co-Morbidity Symptoms are Monitored and Maintained or Improved:   Monitor and assess patient's chronic conditions and comorbid symptoms for stability, deterioration, or improvement   Collaborate with multidisciplinary team to address chronic and comorbid conditions and prevent exacerbation or deterioration   Update acute care plan with appropriate goals if chronic or comorbid symptoms are exacerbated and prevent overall improvement and discharge     Problem: Discharge Planning  Goal: Discharge to home or other facility with appropriate resources  4/8/2025 1347 by Meghna De La Rosa RN  Outcome: Completed  Flowsheets (Taken 4/8/2025 0745)  Discharge to home or other facility with appropriate resources:   Identify barriers to discharge with patient and caregiver   Arrange for needed discharge resources and transportation as appropriate   Identify discharge learning needs (meds, wound care, etc)   Refer to discharge planning if patient needs post-hospital services based on physician order or complex needs related to functional status, cognitive ability or social support system  4/8/2025 0000 by Justyna Briones, RN  Outcome: Progressing  Flowsheets (Taken 4/8/2025 0000)  Discharge to home or other facility 
  Problem: Chronic Conditions and Co-morbidities  Goal: Patient's chronic conditions and co-morbidity symptoms are monitored and maintained or improved  Outcome: Progressing  Flowsheets (Taken 4/8/2025 0000)  Care Plan - Patient's Chronic Conditions and Co-Morbidity Symptoms are Monitored and Maintained or Improved:   Monitor and assess patient's chronic conditions and comorbid symptoms for stability, deterioration, or improvement   Collaborate with multidisciplinary team to address chronic and comorbid conditions and prevent exacerbation or deterioration   Update acute care plan with appropriate goals if chronic or comorbid symptoms are exacerbated and prevent overall improvement and discharge     Problem: Discharge Planning  Goal: Discharge to home or other facility with appropriate resources  Outcome: Progressing  Flowsheets (Taken 4/8/2025 0000)  Discharge to home or other facility with appropriate resources:   Identify barriers to discharge with patient and caregiver   Identify discharge learning needs (meds, wound care, etc)   Arrange for needed discharge resources and transportation as appropriate   Refer to discharge planning if patient needs post-hospital services based on physician order or complex needs related to functional status, cognitive ability or social support system     Problem: Pain  Goal: Verbalizes/displays adequate comfort level or baseline comfort level  Outcome: Progressing  Flowsheets (Taken 4/8/2025 0000)  Verbalizes/displays adequate comfort level or baseline comfort level:   Encourage patient to monitor pain and request assistance   Assess pain using appropriate pain scale   Administer analgesics based on type and severity of pain and evaluate response   Notify Licensed Independent Practitioner if interventions unsuccessful or patient reports new pain   Implement non-pharmacological measures as appropriate and evaluate response     Problem: Skin/Tissue Integrity  Goal: Skin integrity 
4/7/2025 0730)  Discharge to home or other facility with appropriate resources:   Identify barriers to discharge with patient and caregiver   Arrange for needed discharge resources and transportation as appropriate   Identify discharge learning needs (meds, wound care, etc)   Refer to discharge planning if patient needs post-hospital services based on physician order or complex needs related to functional status, cognitive ability or social support system  4/7/2025 0059 by Justyna Briones RN  Outcome: Progressing  Flowsheets (Taken 4/7/2025 0059)  Discharge to home or other facility with appropriate resources:   Identify barriers to discharge with patient and caregiver   Identify discharge learning needs (meds, wound care, etc)   Refer to discharge planning if patient needs post-hospital services based on physician order or complex needs related to functional status, cognitive ability or social support system   Arrange for needed discharge resources and transportation as appropriate  4/7/2025 0058 by Justyna Briones RN  Outcome: Progressing     Problem: Pain  Goal: Verbalizes/displays adequate comfort level or baseline comfort level  4/7/2025 0944 by Meghna De La Rosa RN  Outcome: Progressing  4/7/2025 0059 by Justyna Briones RN  Outcome: Progressing  Flowsheets (Taken 4/7/2025 0059)  Verbalizes/displays adequate comfort level or baseline comfort level:   Encourage patient to monitor pain and request assistance   Assess pain using appropriate pain scale   Administer analgesics based on type and severity of pain and evaluate response   Implement non-pharmacological measures as appropriate and evaluate response   Notify Licensed Independent Practitioner if interventions unsuccessful or patient reports new pain  4/7/2025 0058 by Justyna Briones RN  Outcome: Progressing     Problem: Skin/Tissue Integrity  Goal: Skin integrity remains intact  Description: 1.  Monitor for areas of redness and/or skin breakdown  2.  Assess vascular 
as appropriate  4/7/2025 0058 by Justyna Briones RN  Outcome: Progressing     Problem: Pain  Goal: Verbalizes/displays adequate comfort level or baseline comfort level  4/7/2025 0059 by Justyna Briones RN  Outcome: Progressing  Flowsheets (Taken 4/7/2025 0059)  Verbalizes/displays adequate comfort level or baseline comfort level:   Encourage patient to monitor pain and request assistance   Assess pain using appropriate pain scale   Administer analgesics based on type and severity of pain and evaluate response   Implement non-pharmacological measures as appropriate and evaluate response   Notify Licensed Independent Practitioner if interventions unsuccessful or patient reports new pain  4/7/2025 0058 by Justyna Briones RN  Outcome: Progressing     Problem: Skin/Tissue Integrity  Goal: Skin integrity remains intact  Description: 1.  Monitor for areas of redness and/or skin breakdown  2.  Assess vascular access sites hourly  3.  Every 4-6 hours minimum:  Change oxygen saturation probe site  4.  Every 4-6 hours:  If on nasal continuous positive airway pressure, respiratory therapy assess nares and determine need for appliance change or resting period  4/7/2025 0059 by Justyna Briones RN  Outcome: Progressing  Flowsheets (Taken 4/7/2025 0059)  Skin Integrity Remains Intact:   Monitor for areas of redness and/or skin breakdown   Pressure redistribution bed/mattress (bed type)  4/7/2025 0058 by Justyna Briones RN  Outcome: Progressing     Problem: Safety - Adult  Goal: Free from fall injury  4/7/2025 0059 by Justyna Briones RN  Outcome: Progressing  Flowsheets (Taken 4/7/2025 0059)  Free From Fall Injury: Instruct family/caregiver on patient safety  4/7/2025 0058 by Justyna Briones RN  Outcome: Progressing     Problem: Cardiovascular - Adult  Goal: Maintains optimal cardiac output and hemodynamic stability  Outcome: Progressing  Flowsheets (Taken 4/7/2025 0059)  Maintains optimal cardiac output and hemodynamic stability:   Monitor blood

## 2025-04-08 NOTE — PROGRESS NOTES
All discharge instructions given to patient and family with no further questions at this time. Patient discharged off unit via wheelchair. Chart contents placed in yellow bin.    
[facility]  Sedation/Analgesia History & Physical    Pt Name: Jamie Spangler  Account number: [id number]  MRN: [id number]  YOB: 1970  Provider Performing Procedure: Hakan Baugh MD MD [facility]  Primary Care Physician: Phuc Ortega MD  Date: 4/7/2025    PRE-PROCEDURE    Code Status: FULL CODE  Brief History/Pre-Procedure Diagnosis: [place], referred for cath    Consent: : I have discussed with the patient risks, benefits, and alternatives (and relevant risks, benefits, and side effects related to alternatives or not receiving care), and likelihood of the success.   The patient and/or representative appear to understand and agree to proceed.  The discussion encompasses risks, benefits, and side effects related to the alternatives and the risks related to not receiving the proposed care, treatment, and services.       PLANNED PROCEDURE   [x]Cath  [x]PCI                []Pacemaker/AICD  []MONIKA             []Cardioversion []Peripheral angiography/PTA  []Other:      VITAL SIGNS   Vitals:    04/07/25 0727   BP: 117/68   Pulse: 83   Resp: 16   Temp: 97.8 °F (36.6 °C)   SpO2: 98%       PHYSICAL:   General: No acute distress  HEENT:  Unremarkable for age  Neck: without increased JVD, carotid pulses 2+ bilaterally without bruits  Heart: RRR, S1 & S2 WNL   Lungs: Clear to auscultation    Abdomen: BS present, without HSM, masses, or tenderness    Extremities: without C,C,E.  Pulses 2+ bilaterally  Mental Status: Alert & Oriented    SEDATION  Planned agent:[x]Midazolam []Meperidine []Sublimaze []Morphine  []Diazepam  [x]Other: fentanyl      ASA Classification:  []1 []2 [x]3 []4 []5  Class 1: A normal healthy patient  Class 2: Pt with mild to moderate systemic disease  Class 3: Severe systemic disease or disturbance  Class 4: Severe systemic disorders that are already life threatening.  Class 5: Moribund pt with little chances of survival, for more than 24 hours.  Mallampati I Airway Classification:   
Echo with Lumason completed at bedside. Echo rounder Dr. Hdz to read.    
Pharmacy Medication History Note    List of current medications patient is taking is complete.    Source of information: Patient Interview, medication dispense report, [facility], and [facility] dispense report.    Changes made to medication list:  Medications marked as not taking (include reason, ex. therapy complete or physician discontinued):  REMOVED:  Pregabalin (Patient confirms he does not take; last filled 11/2024)  NOT TAKING:  Aripiprazole ([facility]; not filled since 9/2024)  Sertraline ([facility]; not filled since 9/2024)  Empagliflozin; self-discontinued (Last filled 7/2023)   Levothyroxine; self-discontinued (last filled 2/2024)    Medications added/doses adjusted:  Aripiprazole 10 mg; changed from 5 mg  Sertraline 200 mg; changed from 100 mg daily  Insulin Glargine 25 units SC BID; changed from 30 units SC BID  Insulin Lispro 35 units SC before meals; changed from 35 units + SS before meals    Other notes (ex. Recent course of antibiotics, Coumadin dosing):  Recent courses of antibiotics: doxycycline 100 mg PO BID x 10 days; Augmentin 875-125 mg PO BID x 10 days.  Denies use of other OTC or herbal medications.    Allergies reviewed    Electronically signed by Clem RodríguezD candidate 2025 on 4/7/2025 at 9:57 AM       
Spiritual Health History and Assessment/Progress Note  [facility]    Initial Encounter,  ,  ,      Name: Jamie Spangler MRN: [id number]    Age: 55 y.o.     Sex: male   Language: English   Druze: Adventism   Chest pain     Date: 4/8/2025            Total Time Calculated: 18 min              [facility]        Referral/Consult From: Rounding   Encounter Overview/Reason: Initial Encounter  Service Provided For: Patient    Sonia, Belief, Meaning:   Patient identifies as spiritual, is connected with a sonia tradition or spiritual practice, and has beliefs or practices that help with coping during difficult times  Family/Friends No family/friends present      Importance and Influence:  Patient has spiritual/personal beliefs that influence decisions regarding their health  Family/Friends No family/friends present    Community:  Patient is connected with a spiritual community and feels well-supported. Support system includes: [place] Community and Friends  Family/Friends No family/friends present    Assessment and Plan of Care: Pt awake/alert for visit, sitting in chair. Pt welcomed visit and engaged in conversation. Pt related reason for hospitalization--chest pain--and said they've done a heart scan but they haven't yet figured out a reason/cause for pain. Pt also related chronic condition of diabetes and talked about losing his leg to amputation several years ago. Pt said he was \"mad at God\" about that but later recognized his own responsibility for his health and reconciled with God. Pt said he is currently wrestling with some \"sin\" issues and is needing reconciliation in his relationship with God. I asked Pt if he wanted to say more about that and he said no. Pt said he has a spiritual friend who belongs to a [employer]. Pt indicated he is able to take care of himself at home. I prayed with Pt and advised him that he could request a  
potassium alternative oral replacement **OR** potassium chloride, magnesium sulfate, ondansetron **OR** ondansetron, [DISCONTINUED] acetaminophen **OR** acetaminophen, polyethylene glycol, heparin (porcine), heparin (porcine), cyclobenzaprine, glucose, dextrose bolus **OR** dextrose bolus, glucagon (rDNA), dextrose    Exam:  BP (!) 146/65   Pulse 87   Temp 97.8 °F (36.6 °C) (Oral)   Resp 14   Ht 1.676 m (5' 6\")   Wt 99.8 kg (220 lb)   SpO2 94%   BMI 35.51 kg/m²   General appearance: Obese, older male, sitting on bed, in no acute distress  Eyes:  EOMI, Conjunctivae/corneas clear.  HENT: Head normal in appearance. External nares normal.  Oral mucosa moist without lesions.  Hearing grossly intact.   Neck: Supple, with full range of motion. Trachea midline.  No gross JVD appreciated.  Respiratory:  Normal respiratory effort.CTA, bilat, no rales, wheezes or rhonchi heard.  Cardiovascular: RRR, S1/S2 without murmurs. No LE edema.   Abdomen: Soft, non-tender, non-distended with normal bowel sounds.  Musculoskeletal: No joint swelling or tenderness. Normal tone. No abnormal movements.  Extremities: Patient has R AKA, L foot has scabbed lesion on third digit & base of fifth metatarsal, is clean, dry, nonerythematous, warm, no evidence of infection.  Skin: Warm and dry. No rashes or lesions.  Neurologic:  No focal sensory/motor deficits in the upper and lower extremities. Cranial nerves:  grossly non-focal 2-12.     Psychiatric: Alert and oriented, normal insight and thought content.   Capillary Refill: Brisk,< 3 seconds.  Peripheral Pulses: +2 palpable, equal bilaterally.       Labs/Radiology: See chart or assessment above.     Electronically signed by Prashant Harvey MD on 4/7/2025 at 5:26 PM  Case was discussed with Attending, Dr. HAINES.

## 2025-04-08 NOTE — DISCHARGE SUMMARY
Resident Discharge Summary (Hospitalist)      Patient: Jamie Spangler 55 y.o. male  : 1970  MRN: [id number]   Account: [id number]   Patient's PCP: Phuc Ortega MD    Admit Date: 2025   Discharge Date:   2025    Admitting Physician: No admitting provider for patient encounter.  Discharge Physician: Tashi Herron MD       Discharge Diagnoses:  Chest pain in setting of recent abnormal Stress test: Patient has history of CAD s/p OLEG to RCA  & recent abnormal stress test on  with anterior ischemia. Pain is typical and atypical-describes pain as substernal, pressure-like, but also sharp stabbing, nonradiating, nothing makes it better or worse, including eating, Trop 50-is chronically elevated, concurrent ELDA, also elevated lipase 183.  Currently alleviated by nitro drip.  Received 324 mg aspirin in ED. Started on heparin drip. LHC subsequently showed non-obstructive CAD, Recommended lipid lowering therapy.      ELDA on reported CKD- Improving:  Unclear etiology, assume prerenal, baseline creat ~0.8,1.  Cr trend: 0.8 > 1.3 > 1.2. Patient endorses no difference in oral intake or urine output. Renal US without any obstruction. Urine electrolytes: U Na: 46, U Cl: 31.0, U cr: 79.4, U k: 24.7, U Nitrogen: 612: All parameters wnl.    HFpEF, not acutely decompensated: Patient presents with no symptoms and evidence of volume overload. Last TTE 2025: EF of 55-60%. Normal wall motion. Admission weight 220. Home med regimen: Jardiance 25, metoprolol 25 BID, lisinopril 5 daily. Lasix as needed. ECHO (): EF 55-60% with no diastolic dysfunction.  Continue home medications.     PVD, unspecified: Chronic condition. No recent imaging. S/p RLE AKA. Pt has decreased pulsation in LLE. Vascular studies 3/17 without any significant arterial stenosis.     IDDM2: 2025 A1c 6.8 Home meds, lantus 30, lispro 35 units TID, Jardiance 25mg, and Trulicity weekly.     Hx Diabetic ulcer of toe of L foot w/

## 2025-04-08 NOTE — PALLIATIVE CARE
Follow Up / Progress Note        Patient:   Jamie Spangler  YOB: 1970  Age:  55 y.o.  Room:  [facility]09/[facility]-  MRN:  [id number]         Family/Patient Discussion:  Met with patient at bedside. Introduced palliative care and role on medical team, patient consented to further conversation. Patient up in chair resting comfortably. Discussed history of present illness. Patient stated he had cardiac cath and it didn't find anything but this disappointed him as he has no explanation for his chest pain. Emotional support offered. Discussed advanced directives. Patient stated he has a living will but no healthcare power of . Education provided on healthcare power of , [place] hierarchy of surrogate decision makers. Patient declined to complete power of  documents today.  Discussed code status. Education provided on cardiopulmonary resuscitation including potential risks and outcomes. Education provided on DNRCCA/DNI. Patient did not express wishes to change code status at this time. Patient Denies other needs.       Plan/Follow-Up:  Palliative care will continue to follow, please call for additional needs.         Electronically signed by Masood Desai RN on 4/8/2025 at 11:16 AM             Palliative Care Office: [phone]

## 2025-04-09 ENCOUNTER — CARE COORDINATION (OUTPATIENT)
Dept: CARE COORDINATION | Age: 55
End: 2025-04-09

## 2025-04-09 RX ORDER — ATORVASTATIN CALCIUM 80 MG/1
80 TABLET, FILM COATED ORAL NIGHTLY
Qty: 90 TABLET | Refills: 1 | Status: SHIPPED | OUTPATIENT
Start: 2025-04-09

## 2025-04-09 RX ORDER — OMEPRAZOLE 40 MG/1
CAPSULE, DELAYED RELEASE ORAL
Qty: 90 CAPSULE | Refills: 1 | Status: SHIPPED | OUTPATIENT
Start: 2025-04-09

## 2025-04-09 NOTE — CARE COORDINATION
Care Transitions Note    Initial Call - Call within 2 business days of discharge: Yes-1st attempt    Attempted to reach patient for transitions of care follow up. Unable to reach patient.    Outreach Attempts:   HIPAA compliant voicemail left for patient.   [employer] message sent.     Patient: Jamie Spangler    Patient : 1970   MRN: [id number]    Reason for Admission: chest pain  Discharge Date: 25  RURS: Readmission Risk Score: 20.2    Last Discharge Facility       Date Complaint Diagnosis Description Type Department Provider    25 Chest Pain Chest pain due to myocardial ischemia, unspecified ischemic chest pain type ... ED to Hosp-Admission (Discharged) (ADMITTED) BLANK [facility] Olvin Bowman MD; Sj Ramos, DO            Was this an external facility discharge? No    Follow Up Appointment:   Patient has hospital follow up appointment scheduled within 14 days of discharge.    Future Appointments         Provider Specialty Dept Phone    2025 10:15 AM David Hong MD Cardiology [phone]    2025 1:45 PM Phuc Ortega MD Internal Medicine [phone]    2025 10:15 AM Phuc Ortega MD Internal Medicine [phone]    2025 10:00 AM Melisa Haley [facility] Internal Medicine [phone]            Plan for follow-up on next business day.      Cheryl Schwartz RN

## 2025-04-10 ENCOUNTER — CARE COORDINATION (OUTPATIENT)
Dept: CARE COORDINATION | Age: 55
End: 2025-04-10

## 2025-04-10 NOTE — CARE COORDINATION
Care Transitions Note    Initial Call - Call within 2 business days of discharge: Yes-2nd attempt    Attempted to reach patient for transitions of care follow up. Unable to reach patient.  If no return call, CTN will sign off-2nd attempt.  Will send to Agatha for possible [facility] enrollment.    Outreach Attempts:   HIPAA compliant voicemail left for patient.   [employer] message sent.     Patient: Jamie Spangler    Patient : 1970   MRN: [id number]    Reason for Admission: chest pain  Discharge Date: 25  RURS: Readmission Risk Score: 20.2    Last Discharge Facility       Date Complaint Diagnosis Description Type Department Provider    25 Chest Pain Chest pain due to myocardial ischemia, unspecified ischemic chest pain type ... ED to Hosp-Admission (Discharged) (ADMITTED) BLANK [facility]orno[facility] Olvin Bowman MD; Sj Ramos, DO            Was this an external facility discharge? No    Follow Up Appointment:   Patient has hospital follow up appointment scheduled within 14 days of discharge.    Future Appointments         Provider Specialty Dept Phone    2025 10:15 AM David Hong MD Cardiology [phone]    2025 1:45 PM Phuc Ortega MD Internal Medicine [phone]    2025 10:15 AM Phuc Ortega MD Internal Medicine [phone]    2025 10:00 AM Melisa Haley [facility] Internal Medicine [phone]            No further follow-up call indicated     Cheryl Schwartz RN

## 2025-04-11 ENCOUNTER — CARE COORDINATION (OUTPATIENT)
Dept: CARE COORDINATION | Age: 55
End: 2025-04-11

## 2025-04-11 NOTE — CARE COORDINATION
Ambulatory Care Coordination Note     4/11/2025 10:16 AM     Patient outreach attempt by this ACM today to offer care management services. ACM was unable to reach the patient by telephone today;   left voice message requesting a return phone call to this ACM.     ACM: Agatha Mercado RN     Care Summary Note: Jamie was referred to care coordination by CTN following recent hospital stay.  CTN was not able to reach pt.  Pt has h/o: CHF, CAD, HLD, DM, Bipolar, CKD, AKA, BPH, hypothyroidism.     PCP/Specialist follow up:   Future Appointments         Provider Specialty Dept Phone    4/17/2025 10:15 AM David Hong MD Cardiology [phone]    4/17/2025 1:45 PM Phuc Ortega MD Internal Medicine [phone]    6/24/2025 10:15 AM Phuc Ortega MD Internal Medicine [phone]    9/18/2025 10:00 [facility] [phone]            Follow Up:   Plan for next ACM outreach in approximately 1 week to complete:  - outreach attempt to offer care management services.

## 2025-04-14 ENCOUNTER — CARE COORDINATION (OUTPATIENT)
Dept: CARE COORDINATION | Age: 55
End: 2025-04-14

## 2025-04-14 DIAGNOSIS — S78.111A ABOVE KNEE AMPUTATION OF RIGHT LOWER EXTREMITY: Primary | ICD-10-CM

## 2025-04-14 SDOH — ECONOMIC STABILITY: TRANSPORTATION INSECURITY: IN THE PAST 12 MONTHS, HAS LACK OF TRANSPORTATION KEPT YOU FROM MEDICAL APPOINTMENTS OR FROM GETTING MEDICATIONS?: NO

## 2025-04-14 SDOH — SOCIAL STABILITY: SOCIAL NETWORK: HOW OFTEN DO YOU GET TOGETHER WITH FRIENDS OR RELATIVES?: MORE THAN THREE TIMES A WEEK

## 2025-04-14 SDOH — ECONOMIC STABILITY: FOOD INSECURITY: WITHIN THE PAST 12 MONTHS, YOU WORRIED THAT YOUR FOOD WOULD RUN OUT BEFORE YOU GOT THE MONEY TO BUY MORE.: NEVER TRUE

## 2025-04-14 SDOH — ECONOMIC STABILITY: FOOD INSECURITY: HOW HARD IS IT FOR YOU TO PAY FOR THE VERY BASICS LIKE FOOD, HOUSING, MEDICAL CARE, AND HEATING?: NOT VERY HARD

## 2025-04-14 SDOH — SOCIAL STABILITY: SOCIAL INSECURITY: ARE YOU MARRIED, WIDOWED, DIVORCED, SEPARATED, NEVER MARRIED, OR LIVING WITH A PARTNER?: DIVORCED

## 2025-04-14 SDOH — HEALTH STABILITY: MENTAL HEALTH
DO YOU FEEL STRESS - TENSE, RESTLESS, NERVOUS, OR ANXIOUS, OR UNABLE TO SLEEP AT NIGHT BECAUSE YOUR MIND IS TROUBLED ALL THE TIME - THESE DAYS?: TO SOME EXTENT

## 2025-04-14 SDOH — ECONOMIC STABILITY: FOOD INSECURITY: WITHIN THE PAST 12 MONTHS, THE FOOD YOU BOUGHT JUST DIDN'T LAST AND YOU DIDN'T HAVE MONEY TO GET MORE.: NEVER TRUE

## 2025-04-14 SDOH — HEALTH STABILITY: PHYSICAL HEALTH: ON AVERAGE, HOW MANY DAYS PER WEEK DO YOU ENGAGE IN MODERATE TO STRENUOUS EXERCISE (LIKE A BRISK WALK)?: 3 DAYS

## 2025-04-14 SDOH — HEALTH STABILITY: PHYSICAL HEALTH: ON AVERAGE, HOW MANY MINUTES DO YOU ENGAGE IN EXERCISE AT THIS LEVEL?: 60 MIN

## 2025-04-14 SDOH — SOCIAL STABILITY: SOCIAL NETWORK
DO YOU BELONG TO ANY CLUBS OR ORGANIZATIONS SUCH AS CHURCH GROUPS, UNIONS, FRATERNAL OR ATHLETIC GROUPS, OR SCHOOL GROUPS?: NO

## 2025-04-14 SDOH — ECONOMIC STABILITY: HOUSING INSECURITY: IN THE LAST 12 MONTHS, WAS THERE A TIME WHEN YOU WERE NOT ABLE TO PAY THE MORTGAGE OR RENT ON TIME?: NO

## 2025-04-14 SDOH — SOCIAL STABILITY: SOCIAL NETWORK
IN A TYPICAL WEEK, HOW MANY TIMES DO YOU TALK ON THE PHONE WITH FAMILY, FRIENDS, OR NEIGHBORS?: MORE THAN THREE TIMES A WEEK

## 2025-04-14 SDOH — SOCIAL STABILITY: SOCIAL NETWORK: HOW OFTEN DO YOU ATTEND MEETINGS OF THE CLUBS OR ORGANIZATIONS YOU BELONG TO?: NEVER

## 2025-04-14 ASSESSMENT — ACTIVITIES OF DAILY LIVING (ADL): LACK_OF_TRANSPORTATION: NO

## 2025-04-14 NOTE — CARE COORDINATION
Ambulatory Care Coordination Note     2025 3:43 PM     Patient Current Location:  Home: [address] [place] [address]     This patient was received as a referral from Care Transitions Nurse    ACM contacted the patient by telephone. Verified name and  with patient as identifiers. Provided introduction to self, and explanation of the ACM role.   Patient accepted care management services at this time.          ACM: Agatha Mercado RN     Challenges to be reviewed by the provider   Additional needs identified to be addressed with provider Yes  Pt seen on 3/26 by Dr. Linder and Dr. Saldivar. States that he asked about getting outpt PT order but never received one.  Pt is Right AKA.  Never went through PT after receiving prosthesis.  Pt would like to go to [facility]'s outpt therapy.  Please advise.             Method of communication with provider: none.    Utilization: Initial Call - N/A    Care Summary Note: Jamie was referred to care coordination by CTN following recent hospitalization.  Pt was admitted for CP.    Pt has h/o: CHF, HTN, CKD, DM, PVD, bipolar, Right AKA, CAD, BPH, hypothyroidism.    Spoke with Jamie today.    DM: A1C 6.8.  has CGM.  Following with diabetic clinic. On Toujeo, Humalog, Jardiance.   Pt has sore on left 2nd toe.  Following w/Dr. Cruz.  Pt will be calling to arrange appt. Recently completed course of atb. Cleansing with Iodine daily.    Pt has prosthesis for Right AKA.  States that he just got it back.  Pt never completed formal outpt PT since receiving his prosthesis.  Pt is motivated to walk again and is requesting outpt PT script.  Pt was seen at office on 3/26.  Will see if note can be addended and order placed.    Pt is scheduled to see cardiology, PCP on .  Pt reports that he has to reschedule appt with Dr. Ortega b/c he has an eye appt at 130pm that day.    Pt c/o neuropathy to hands.  Was on gabapentin at one time but previous PCP took him off of medication.  Would

## 2025-04-14 NOTE — CARE COORDINATION
Ambulatory Care Coordination Note     4/14/2025 10:43 AM     Patient outreach attempt by this ACM today to offer care management services. ACM was unable to reach the patient by telephone today;   left voice message requesting a return phone call to this ACM.     ACM: Agatha Mercado RN     Care Summary Note: : Jamie was referred to care coordination by CTN following recent hospital stay.  CTN was not able to reach pt.  Pt has h/o: CHF, CAD, HLD, DM, Bipolar, CKD, AKA, BPH, hypothyroidism.     PCP/Specialist follow up:   Future Appointments         Provider Specialty Dept Phone    4/17/2025 10:15 AM David Hong MD Cardiology [phone]    4/17/2025 1:45 PM Phuc Ortega MD Internal Medicine [phone]    6/24/2025 10:15 AM Phuc Ortega MD Internal Medicine [phone]    9/18/2025 10:00 [facility] [phone]            Follow Up:   Plan for next ACM outreach in approximately 1 week to complete:  - outreach attempt to offer care management services.

## 2025-04-15 RX ORDER — SODIUM CHLORIDE 9 MG/ML
25 INJECTION, SOLUTION INTRAVENOUS PRN
Status: DISCONTINUED | OUTPATIENT
Start: 2025-04-15 | End: 2025-04-16 | Stop reason: HOSPADM

## 2025-04-15 RX ORDER — SODIUM CHLORIDE 9 MG/ML
INJECTION, SOLUTION INTRAVENOUS CONTINUOUS
Status: DISCONTINUED | OUTPATIENT
Start: 2025-04-15 | End: 2025-04-16 | Stop reason: HOSPADM

## 2025-04-15 RX ORDER — SODIUM CHLORIDE 0.9 % (FLUSH) 0.9 %
5-40 SYRINGE (ML) INJECTION EVERY 12 HOURS SCHEDULED
Status: DISCONTINUED | OUTPATIENT
Start: 2025-04-15 | End: 2025-04-16 | Stop reason: HOSPADM

## 2025-04-15 RX ORDER — SODIUM CHLORIDE 0.9 % (FLUSH) 0.9 %
5-40 SYRINGE (ML) INJECTION PRN
Status: DISCONTINUED | OUTPATIENT
Start: 2025-04-15 | End: 2025-04-16 | Stop reason: HOSPADM

## 2025-04-16 ENCOUNTER — HOSPITAL ENCOUNTER (OUTPATIENT)
Age: 55
Setting detail: OUTPATIENT SURGERY
Discharge: HOME | End: 2025-04-16
Attending: INTERNAL MEDICINE | Admitting: INTERNAL MEDICINE
Payer: MEDICARE

## 2025-04-16 ENCOUNTER — ANESTHESIA (OUTPATIENT)
Dept: ENDOSCOPY | Age: 55
End: 2025-04-16
Payer: MEDICARE

## 2025-04-16 ENCOUNTER — APPOINTMENT (OUTPATIENT)
Dept: ENDOSCOPY | Age: 55
End: 2025-04-16
Attending: INTERNAL MEDICINE
Payer: MEDICARE

## 2025-04-16 ENCOUNTER — ANESTHESIA EVENT (OUTPATIENT)
Dept: ENDOSCOPY | Age: 55
End: 2025-04-16
Payer: MEDICARE

## 2025-04-16 VITALS
RESPIRATION RATE: 16 BRPM | WEIGHT: 219 LBS | HEART RATE: 95 BPM | DIASTOLIC BLOOD PRESSURE: 72 MMHG | HEIGHT: 66 IN | BODY MASS INDEX: 35.2 KG/M2 | SYSTOLIC BLOOD PRESSURE: 132 MMHG | OXYGEN SATURATION: 99 % | TEMPERATURE: 98.2 F

## 2025-04-16 PROCEDURE — 7100000010 HC PHASE II RECOVERY - FIRST 15 MIN: Performed by: INTERNAL MEDICINE

## 2025-04-16 PROCEDURE — 88305 TISSUE EXAM BY PATHOLOGIST: CPT

## 2025-04-16 PROCEDURE — 3700000001 HC ADD 15 MINUTES (ANESTHESIA): Performed by: INTERNAL MEDICINE

## 2025-04-16 PROCEDURE — 2580000003 HC RX 258: Performed by: INTERNAL MEDICINE

## 2025-04-16 PROCEDURE — 3609012400 HC EGD TRANSORAL BIOPSY SINGLE/MULTIPLE: Performed by: INTERNAL MEDICINE

## 2025-04-16 PROCEDURE — 7100000011 HC PHASE II RECOVERY - ADDTL 15 MIN: Performed by: INTERNAL MEDICINE

## 2025-04-16 PROCEDURE — 3700000000 HC ANESTHESIA ATTENDED CARE: Performed by: INTERNAL MEDICINE

## 2025-04-16 PROCEDURE — 6360000002 HC RX W HCPCS: Performed by: NURSE ANESTHETIST, CERTIFIED REGISTERED

## 2025-04-16 RX ORDER — LIDOCAINE HYDROCHLORIDE 20 MG/ML
INJECTION, SOLUTION INFILTRATION; PERINEURAL
Status: DISCONTINUED | OUTPATIENT
Start: 2025-04-16 | End: 2025-04-16 | Stop reason: SDUPTHER

## 2025-04-16 RX ORDER — PROPOFOL 10 MG/ML
INJECTION, EMULSION INTRAVENOUS
Status: DISCONTINUED | OUTPATIENT
Start: 2025-04-16 | End: 2025-04-16 | Stop reason: SDUPTHER

## 2025-04-16 RX ADMIN — LIDOCAINE HYDROCHLORIDE 50 MG: 20 INJECTION, SOLUTION INFILTRATION; PERINEURAL at 08:38

## 2025-04-16 RX ADMIN — SODIUM CHLORIDE: 9 INJECTION, SOLUTION INTRAVENOUS at 08:28

## 2025-04-16 RX ADMIN — PROPOFOL 80 MG: 10 INJECTION, EMULSION INTRAVENOUS at 08:38

## 2025-04-16 ASSESSMENT — PAIN - FUNCTIONAL ASSESSMENT
PAIN_FUNCTIONAL_ASSESSMENT: NONE - DENIES PAIN

## 2025-04-16 NOTE — ANESTHESIA POSTPROCEDURE EVALUATION
Department of Anesthesiology  Postprocedure Note    Patient: Jamie Spangler  MRN: [id number]  YOB: 1970  Date of evaluation: 4/16/2025    Procedure Summary       Date: 04/16/25 Room / Location: [place] [address] / [facility]    Anesthesia Start: 0825 Anesthesia Stop: 0847    Procedure: ESOPHAGOGASTRODUODENOSCOPY BIOPSY Diagnosis:       Dysphagia, unspecified type      (Dysphagia, unspecified type [R13.10])    Surgeons: Miguel Hernandez MD Responsible Provider: Louie Lua DO    Anesthesia Type: MAC ASA Status: 3            Anesthesia Type: No value filed.    Everette Phase I: Everette Score: 9    Everette Phase II:      Anesthesia Post Evaluation    Patient location during evaluation: bedside  Patient participation: complete - patient participated  Level of consciousness: awake and alert  Pain score: 0  Airway patency: patent  Nausea & Vomiting: no nausea and no vomiting  Cardiovascular status: hemodynamically stable  Respiratory status: acceptable and spontaneous ventilation  Hydration status: stable  Pain management: adequate        No notable events documented.

## 2025-04-16 NOTE — PROGRESS NOTES
EGD completed. Tolerated well. Photos taken. Biopsies taken. One specimen jar labeled and sent to lab. Scope [id number].

## 2025-04-16 NOTE — ANESTHESIA PRE PROCEDURE
Department of Anesthesiology  Preprocedure Note       Name:  Jamie Spangler   Age:  55 y.o.  :  1970                                          MRN:  [id number]         Date:  2025      Surgeon: Surgeon(s):  Mgiuel Hernandez MD    Procedure: Procedure(s):  EGD    Medications prior to admission:   Prior to Admission medications    Medication Sig Start Date End Date Taking? Authorizing Provider   omeprazole (PRILOSEC) 40 MG delayed release capsule TAKE ONE CAPSULE BY MOUTH IN THE MORNING BEFORE BREAKFAST 25  Yes Phuc Ortega MD   atorvastatin (LIPITOR) 80 MG tablet TAKE ONE TABLET BY MOUTH AT BEDTIME 25  Yes Phuc Ortega MD   ARIPiprazole (ABILIFY) 10 MG tablet Take 1 tablet by mouth daily   Yes Minesh Middleton MD   isosorbide mononitrate (IMDUR) 60 MG extended release tablet Take 1 tablet by mouth daily 3/24/25  Yes David Hong MD   ranolazine (RANEXA) 500 MG extended release tablet Take 1 tablet by mouth 2 times daily 3/24/25  Yes David Hong MD   furosemide (LASIX) 20 MG tablet Take 1 tablet by mouth daily 25  Yes Phuc Ortega MD   lisinopril (PRINIVIL;ZESTRIL) 5 MG tablet Take 1 tablet by mouth daily Start this med in two days from 23  Yes Phuc Ortega MD   insulin lispro (HUMALOG KWIKPEN U-200) 200 UNIT/ML SOPN pen 35 units Plus scale before meals; only takes if BG >200 - Max dose 160 units per day     States not doing sliding scale  Patient taking differently: Inject 30 Units into the skin 3 times daily (before meals) 35 units Plus scale before meals; only takes if BG >200 - Max dose 160 units per day     States not doing sliding scale  Only taking 30 units 25  Yes Phuc Ortega MD   Insulin Glargine, 2 Unit Dial, (TOUJEO MAX SOLOSTAR) 300 UNIT/ML concentrated injection pen INJECT 30 UNITS IN THE MORNING, 30 UNITS IN THE EVENING. EVERY 10 DAYS INCREASE BY 5 UNITS BOTH TIMES, TO GET MORNING GLUCOSE 150-200.  Patient taking differently: 25 Units

## 2025-04-16 NOTE — DISCHARGE INSTRUCTIONS
ANTI-REFLUX INSTRUCTIONS  LOW RESIDUE DIET  SMALL MEALS 4 X DAILY  GASTRIC EMPTYING STUDIES SOLID PHASE ONLY  FOLLOW UP WITH ME IN 4 WKS .

## 2025-04-16 NOTE — PROGRESS NOTES
Admitted to Endo department and admitted to Endo room 12  Plan of care reviewed with patient.   Call light within reach.   Allergies reviewed with pt   Bed in lowest position, locked, with one bed rail up.   Appropriate arm bands on patient.   Bathroom offered.   All questions and concerns of patient addressed    Name: Tevin  Relationship to patient: friend  Phone number: [phone]

## 2025-04-16 NOTE — PROCEDURES
[address] [address]                             PROCEDURE NOTE      PATIENT NAME: FABIÁN PENA               : 1970  MED REC NO: [id number]                       ROOM: [facility]  ACCOUNT NO: [id number]                       ADMIT DATE: 2025  PROVIDER: Migeul Hernandez MD      DATE OF PROCEDURE:  2025    SURGEON:  Miguel Hernandez MD    PROCEDURE:  Esophagogastroduodenoscopy.    INDICATION:  Patient is a 55-year-old pleasant male who complains of nausea, difficulty swallowing.  He is undergoing further evaluation.  Please see my brief history for details and for physical examination.    ASA CLASSIFICATION:  As per Anesthesia.  Please see Anesthesia note for details.    INSTRUMENT:  GIF- gastroscope.    PHOTOGRAPHS:  Yes.    BIOPSIES:  Yes.    ESTIMATED BLOOD LOSS:  Less than 10 mL.    Procedure indications and complications including, but not limited to perforation, bleeding, infection, adverse reaction to medicine, very slight chance of missing significant lesions discussed with the patient.  The patient expressed his understanding and a written consent was obtained.    DESCRIPTION OF PROCEDURE:  The patient was placed in the left lateral decubitus position in the endo room #11.  The patient was placed on appropriate monitoring of vitals including blood pressure, heart rate, and pulse ox.  Oropharynx was sprayed with Cetacaine spray, and bite block was placed between maxilla and mandible.  After the adequate total intravenous anesthesia was given by Anesthesia, the GIF- gastroscope was inserted into the oropharynx and the esophagus was intubated.  Under direct vision the scope was advanced down through the stomach into 2nd portion of duodenum.  On careful inspection and on withdrawal of the scope, the duodenum looks normal as shown in picture number A6.  In the antrum, patient had undigested food noted

## 2025-04-16 NOTE — H&P
[address] [address]                            PREOPERATIVE H&P      PATIENT NAME: FABIÁN PENA               : 1970  MED REC NO: [id number]                       ROOM: [facility]  ACCOUNT NO: [id number]                       ADMIT DATE: 2025  PROVIDER: Miguel Hernandez MD      DATE OF PROCEDURE:  2025.    PROCEDURE:  Esophagogastroduodenoscopy.    INDICATION:  55-year-old pleasant male complains of intermittent difficulty swallowing, reflux symptoms, nausea.  He is undergoing further evaluation.    PAST MEDICAL HISTORY:  Acute osteomyelitis of shoulder region, acute pancreatitis, atherosclerotic heart disease, bipolar disorder, benign prostatic hypertrophy, chronic diastolic congestive heart failure, chronic obstructive pulmonary disease, diabetes mellitus type 2, diabetic peripheral neuropathy, diabetic polyneuropathy, diabetic ulcer of the right foot, hammertoe, coronary artery stent placement, above-knee amputation, macular degeneration, marijuana abuse, sleep apnea, shoulder repair.    PAST SURGICAL HISTORY:  Abdominal surgery, abscess drainage, amputation above the right knee, cholecystectomy, colonoscopy, cystoscopy, dilation of the esophagus, incision and drainage, below-knee leg amputation, toe amputation, EGD, wisdom tooth extraction.    MEDICATIONS:  Reviewed.    PHYSICAL EXAMINATION:  VITAL SIGNS:  Temperature 98.1, pulse 94, respiratory rate 16, blood pressure 166/44.  HEART:  Regular.  Normal S1 and S2.  No S3.  LUNGS:  Fair air entry bilaterally.  ABDOMEN:  Soft.  Normoactive bowel sounds.    IMPRESSION:  55-year-old pleasant male with nausea, complains of a history of difficulty swallowing.  He is undergoing further evaluation.    RECOMMENDATIONS:  Keep the patient nothing by mouth.  Proceed with EGD, possible dilation.  I have discussed with the patient regarding the EGD, dilation, its

## 2025-04-16 NOTE — PROGRESS NOTES
Recovery mode, pt denies discomfort. Passing gas, taking in fluids. Dr. Hernandez discussed findings with pt and responsible party. Discharge instructions provided and understanding verbalized.

## 2025-04-16 NOTE — PROGRESS NOTES
Per supervisor pt able to leave in motorized wheelchair with friend Tevin as he lived right across the street..

## 2025-04-16 NOTE — BRIEF OP NOTE
Brief Postoperative Note      Patient: Jamie Spangler  YOB: 1970  MRN: [id number]    Date of Procedure: 4/16/2025    Pre-Op Diagnosis Codes:      * Dysphagia, unspecified type [R13.10]    Post-Op Diagnosis: Post-Op Diagnosis Codes:     * Dysphagia, unspecified type [R13.10]       Procedure(s):  ESOPHAGOGASTRODUODENOSCOPY BIOPSY    Surgeon(s):  Miguel Hernandez MD    Assistant:  * No surgical staff found *    Anesthesia: Monitor Anesthesia Care    Estimated Blood Loss (mL): Minimal    Complications: None    Specimens:   ID Type Source Tests Collected by Time Destination   A : biopsy mid and proximal esophagus r/o eosinophilic esophagitis Tissue Esophagus SURGICAL PATHOLOGY Miguel Hernandez MD 4/16/2025 0843        Implants:  * No implants in log *      Drains: * No LDAs found *    Findings:  Infection Present At Time Of Surgery (PATOS) (choose all levels that have infection present):  No infection present  Other Findings: SIGNIFICANT AMOUNT OF SOLID FOOD NOTED IN THE BODY AND FUNDUS OF STOMACH    Electronically signed by Miguel Hernandez MD on 4/16/2025 at 8:52 AM

## 2025-04-17 ENCOUNTER — OFFICE VISIT (OUTPATIENT)
Dept: CARDIOLOGY CLINIC | Age: 55
End: 2025-04-17
Payer: MEDICARE

## 2025-04-17 ENCOUNTER — HOSPITAL ENCOUNTER (OUTPATIENT)
Age: 55
Discharge: HOME | End: 2025-04-17
Payer: MEDICARE

## 2025-04-17 VITALS
HEIGHT: 66 IN | DIASTOLIC BLOOD PRESSURE: 79 MMHG | BODY MASS INDEX: 35.2 KG/M2 | HEART RATE: 87 BPM | WEIGHT: 219 LBS | SYSTOLIC BLOOD PRESSURE: 138 MMHG

## 2025-04-17 DIAGNOSIS — I25.10 CORONARY ARTERY DISEASE INVOLVING NATIVE CORONARY ARTERY OF NATIVE HEART WITHOUT ANGINA PECTORIS: Primary | ICD-10-CM

## 2025-04-17 DIAGNOSIS — E78.00 PURE HYPERCHOLESTEROLEMIA: ICD-10-CM

## 2025-04-17 DIAGNOSIS — E78.5 DYSLIPIDEMIA: ICD-10-CM

## 2025-04-17 DIAGNOSIS — I50.32 CHRONIC DIASTOLIC CONGESTIVE HEART FAILURE (HCC): ICD-10-CM

## 2025-04-17 DIAGNOSIS — E11.22 TYPE 2 DIABETES MELLITUS WITH CHRONIC KIDNEY DISEASE ON CHRONIC DIALYSIS, WITH LONG-TERM CURRENT USE OF INSULIN (HCC): ICD-10-CM

## 2025-04-17 DIAGNOSIS — R07.89 CHEST PAIN, ATYPICAL: ICD-10-CM

## 2025-04-17 DIAGNOSIS — Z95.820 S/P ANGIOPLASTY WITH STENT: ICD-10-CM

## 2025-04-17 DIAGNOSIS — Z79.4 TYPE 2 DIABETES MELLITUS WITH CHRONIC KIDNEY DISEASE ON CHRONIC DIALYSIS, WITH LONG-TERM CURRENT USE OF INSULIN (HCC): ICD-10-CM

## 2025-04-17 DIAGNOSIS — N18.6 TYPE 2 DIABETES MELLITUS WITH CHRONIC KIDNEY DISEASE ON CHRONIC DIALYSIS, WITH LONG-TERM CURRENT USE OF INSULIN (HCC): ICD-10-CM

## 2025-04-17 DIAGNOSIS — Z99.2 TYPE 2 DIABETES MELLITUS WITH CHRONIC KIDNEY DISEASE ON CHRONIC DIALYSIS, WITH LONG-TERM CURRENT USE OF INSULIN (HCC): ICD-10-CM

## 2025-04-17 DIAGNOSIS — I10 ESSENTIAL HYPERTENSION: ICD-10-CM

## 2025-04-17 PROBLEM — R07.9 CHEST PAIN: Status: RESOLVED | Noted: 2025-04-06 | Resolved: 2025-04-17

## 2025-04-17 LAB
ALBUMIN SERPL BCG-MCNC: 4.1 G/DL (ref 3.4–4.9)
ALP SERPL-CCNC: 84 U/L (ref 40–129)
ALT SERPL W/O P-5'-P-CCNC: 23 U/L (ref 10–50)
ANION GAP SERPL CALC-SCNC: 11 MEQ/L (ref 8–16)
AST SERPL-CCNC: 32 U/L (ref 10–50)
BILIRUB SERPL-MCNC: 0.6 MG/DL (ref 0.3–1.2)
BUN SERPL-MCNC: 10 MG/DL (ref 8–23)
CALCIUM SERPL-MCNC: 9.5 MG/DL (ref 8.6–10)
CHLORIDE SERPL-SCNC: 101 MEQ/L (ref 98–111)
CHOLEST SERPL-MCNC: 109 MG/DL (ref 100–199)
CO2 SERPL-SCNC: 26 MEQ/L (ref 22–29)
CREAT SERPL-MCNC: 0.8 MG/DL (ref 0.7–1.2)
DEPRECATED MEAN GLUCOSE BLD GHB EST-ACNC: 153 MG/DL (ref 70–126)
DEPRECATED RDW RBC AUTO: 47.9 FL (ref 35–45)
ERYTHROCYTE [DISTWIDTH] IN BLOOD BY AUTOMATED COUNT: 14.4 % (ref 11.5–14.5)
GFR SERPL CREATININE-BSD FRML MDRD: > 90 ML/MIN/1.73M2
GLUCOSE SERPL-MCNC: 148 MG/DL (ref 74–109)
HBA1C MFR BLD HPLC: 7.1 % (ref 4–6)
HCT VFR BLD AUTO: 46.4 % (ref 42–52)
HDLC SERPL-MCNC: 37 MG/DL
HGB BLD-MCNC: 15.9 GM/DL (ref 14–18)
LDLC SERPL CALC-MCNC: 58 MG/DL
MCH RBC QN AUTO: 31.3 PG (ref 26–33)
MCHC RBC AUTO-ENTMCNC: 34.3 GM/DL (ref 32.2–35.5)
MCV RBC AUTO: 91.3 FL (ref 80–94)
PLATELET # BLD AUTO: 213 THOU/MM3 (ref 130–400)
PMV BLD AUTO: 9.7 FL (ref 9.4–12.4)
POTASSIUM SERPL-SCNC: 4.3 MEQ/L (ref 3.5–5.2)
PROT SERPL-MCNC: 7.5 G/DL (ref 6.4–8.3)
RBC # BLD AUTO: 5.08 MILL/MM3 (ref 4.7–6.1)
SODIUM SERPL-SCNC: 138 MEQ/L (ref 135–145)
TRIGL SERPL-MCNC: 68 MG/DL (ref 0–199)
WBC # BLD AUTO: 10.2 THOU/MM3 (ref 4.8–10.8)

## 2025-04-17 PROCEDURE — 3017F COLORECTAL CA SCREEN DOC REV: CPT | Performed by: INTERNAL MEDICINE

## 2025-04-17 PROCEDURE — 3078F DIAST BP <80 MM HG: CPT | Performed by: INTERNAL MEDICINE

## 2025-04-17 PROCEDURE — 80061 LIPID PANEL: CPT

## 2025-04-17 PROCEDURE — G8417 CALC BMI ABV UP PARAM F/U: HCPCS | Performed by: INTERNAL MEDICINE

## 2025-04-17 PROCEDURE — 80053 COMPREHEN METABOLIC PANEL: CPT

## 2025-04-17 PROCEDURE — 85027 COMPLETE CBC AUTOMATED: CPT

## 2025-04-17 PROCEDURE — 1036F TOBACCO NON-USER: CPT | Performed by: INTERNAL MEDICINE

## 2025-04-17 PROCEDURE — 3075F SYST BP GE 130 - 139MM HG: CPT | Performed by: INTERNAL MEDICINE

## 2025-04-17 PROCEDURE — 36415 COLL VENOUS BLD VENIPUNCTURE: CPT

## 2025-04-17 PROCEDURE — 83036 HEMOGLOBIN GLYCOSYLATED A1C: CPT

## 2025-04-17 PROCEDURE — 99214 OFFICE O/P EST MOD 30 MIN: CPT | Performed by: INTERNAL MEDICINE

## 2025-04-17 PROCEDURE — G8427 DOCREV CUR MEDS BY ELIG CLIN: HCPCS | Performed by: INTERNAL MEDICINE

## 2025-04-17 PROCEDURE — 1111F DSCHRG MED/CURRENT MED MERGE: CPT | Performed by: INTERNAL MEDICINE

## 2025-04-17 NOTE — PROGRESS NOTES
Chief Complaint   Patient presents with    Follow Up After Procedure     Heart Cath 4/7 with Dr. Baugh     [facility] F[facility]u  CONSULT DATE:  06/19/2020    REASON OF CONSULTATION:  Worsening of shortness of breath and  palpitation with history of intermittent chest pain in the last several  weeks.    PROVIDER:     David Hong M.D.     CONSULT DATE:  11/24/2022     REASON FOR CONSULTATION:  Chest pain, elevated troponin in a 52-year-old  gentleman, presented with recurrent chest pain over the last four to  five days.    Last seen in office dec 2020  Later seen in hospital nov 2022  Later seen 07/2023            [facility] f/u - heart cath 4/7/25 and pos=t hospital f/u     EKG 4-6-25    No sob    Cont to have intermittent atypical cp for over 1 month and no cp now   Cath done 47/2025 nonobstructive    Denies  palpitations, dizziness, and edema.     On wheelchair with AKA  RT, can do crunch    Hx of of room spinning - vertigo- noted in sitting , supine as well- resolved  Pat wheelchair bound with RT AKA    Had cath and pci of the RCA nov 2022 and later  cath feb 2023 nonobstructive with patent stent     Hx of Occasional dizziness    RT AKA- due to diabetes      Small Pericardial effusion  noted on CT chest not confirmed on echo      Patient Active Problem List   Diagnosis    Gait disturbance    Severe bipolar II disorder, recent episode major depressive, remission (HCC)    Obesity (BMI 30-39.9)    History of noncompliance with medical treatment    Essential hypertension    Diabetic ulcer of toe of left foot associated with type 2 diabetes mellitus, with fat layer exposed    Gastroesophageal reflux disease without esophagitis    History of marijuana use    Physical debility    Above knee amputation of right lower extremity    Vitamin D deficiency    Major depressive disorder, recurrent    MURALI (obstructive sleep apnea)    Adult hypothyroidism    Anxiety and depression    Hyperlipidemia    Neuropathy    Cellulitis of left

## 2025-04-23 ENCOUNTER — CARE COORDINATION (OUTPATIENT)
Dept: CARE COORDINATION | Age: 55
End: 2025-04-23

## 2025-04-28 ENCOUNTER — OFFICE VISIT (OUTPATIENT)
Dept: INTERNAL MEDICINE CLINIC | Age: 55
End: 2025-04-28

## 2025-04-28 ENCOUNTER — CARE COORDINATION (OUTPATIENT)
Dept: CARE COORDINATION | Age: 55
End: 2025-04-28

## 2025-04-28 VITALS
TEMPERATURE: 98 F | SYSTOLIC BLOOD PRESSURE: 100 MMHG | OXYGEN SATURATION: 96 % | RESPIRATION RATE: 18 BRPM | HEART RATE: 98 BPM | DIASTOLIC BLOOD PRESSURE: 58 MMHG

## 2025-04-28 DIAGNOSIS — E78.5 DYSLIPIDEMIA: ICD-10-CM

## 2025-04-28 DIAGNOSIS — E11.43 DIABETIC AUTONOMIC NEUROPATHY ASSOCIATED WITH TYPE 2 DIABETES MELLITUS (HCC): Primary | ICD-10-CM

## 2025-04-28 DIAGNOSIS — Z87.891 FORMER SMOKER: ICD-10-CM

## 2025-04-28 RX ORDER — GABAPENTIN 400 MG/1
400 CAPSULE ORAL 3 TIMES DAILY
Qty: 90 CAPSULE | Refills: 2 | Status: SHIPPED | OUTPATIENT
Start: 2025-04-28 | End: 2025-07-27

## 2025-04-28 RX ORDER — DULOXETIN HYDROCHLORIDE 30 MG/1
30 CAPSULE, DELAYED RELEASE ORAL DAILY
Qty: 30 CAPSULE | Refills: 3 | Status: SHIPPED | OUTPATIENT
Start: 2025-04-28

## 2025-04-28 NOTE — CARE COORDINATION
Ambulatory Care Coordination Note     4/28/2025 12:47 PM     Patient outreach attempt by this ACM today to perform care management follow up . ACM was unable to reach the patient by telephone today;   Pt answered but requests to call back later today.     -pt is scheduled to see PCP this afternoon.   ACM: Agatha Mercado RN     Care Summary Note:  Jamie was referred to care coordination by CTN following recent hospitalization.  Pt was admitted for CP.    Pt has h/o: CHF, HTN, CKD, DM, PVD, bipolar, Right AKA, CAD, BPH, hypothyroidism.      PCP/Specialist follow up:   Future Appointments         Provider Specialty Dept Phone    4/28/2025 1:45 PM Phuc Ortega MD Internal Medicine [phone]    4/30/2025 10:30 AM Eleuterio Marrero, PT Physical Therapy [phone]    6/24/2025 10:15 AM Phuc Ortega MD Internal Medicine [phone]    9/18/2025 10:00 [facility] [phone]    10/23/2025 11:15 AM David Hong MD Cardiology [phone]            Follow Up:   Plan for next ACM outreach in approximately 1 week to complete:  - CC Protocol assessments  - disease specific assessments  - goal progression  - education   - RPM.

## 2025-04-28 NOTE — PROGRESS NOTES
[facility] Internal Medicine   [address]  [place] [id number]  Dept: [phone]  Dept Fax: [phone]    YOB: 1970    Post hosp follow up     55 y.o. male   here for follow-up of above issues. He does have hx of DM now followed by dr. Mitchell, and does have an appt with their office next month. I haven' seen him since 2023 . He called and scheduled this appt today. Now he gets around in an electric WC, had a CVA of the right eye.   Can see with the right eye , but impaired. No recent LOC.   Now he claims , would like us to follow up with his DM.    Patient had a heart catheterization on 4/7/2025 secondary to chest pain which was nonobstructive subsequently seen  in the office because of ongoing intermittent atypical chest pain for over a month.  Does have a PCI of the RCA back in 2022, and also 23 had nonobstructive pattern the cardiac cath.  Unfortunately does have all the risk factors including diabetes and right above-the-knee amputation gets around in a wheelchair, with dyslipidemia etc. Jamie claims he is taking his medication as directed.  He also had endoscopy done by Dr. Hernandez on 4/16/2025 secondary to dysphagia no bleeding was noted, he feels it is undigested food likely secondary gastroparesis. He plans on seeing him , a possible candidate for gastric emptying study.     Did not bring the dexcom today , he does check sugars regularly . I am pleased with his A1c of 7.1     Current Outpatient Medications   Medication Sig Dispense Refill    omeprazole (PRILOSEC) 40 MG delayed release capsule TAKE ONE CAPSULE BY MOUTH IN THE MORNING BEFORE BREAKFAST 90 capsule 1    atorvastatin (LIPITOR) 80 MG tablet TAKE ONE TABLET BY MOUTH AT BEDTIME 90 tablet 1    ARIPiprazole (ABILIFY) 10 MG tablet Take 1 tablet by mouth daily      Blood Glucose Monitoring Suppl (FREESTYLE LITE) LIANNE 1 Device by Does not apply route 1 time for 1 dose Freestyle glucose meter or

## 2025-05-05 ENCOUNTER — CARE COORDINATION (OUTPATIENT)
Dept: CARE COORDINATION | Age: 55
End: 2025-05-05

## 2025-05-05 NOTE — CARE COORDINATION
Attempted to reach patient for continued Care Coordination follow up and education.  Patient was unavailable at the time of my call, and a generic voicemail message was left asking patient to return my call at [phone].

## 2025-05-08 ENCOUNTER — APPOINTMENT (OUTPATIENT)
Dept: GENERAL RADIOLOGY | Age: 55
End: 2025-05-08
Payer: MEDICARE

## 2025-05-08 ENCOUNTER — HOSPITAL ENCOUNTER (EMERGENCY)
Age: 55
Discharge: HOME | End: 2025-05-08
Attending: FAMILY MEDICINE
Payer: MEDICARE

## 2025-05-08 VITALS
RESPIRATION RATE: 18 BRPM | HEART RATE: 84 BPM | WEIGHT: 219 LBS | SYSTOLIC BLOOD PRESSURE: 157 MMHG | OXYGEN SATURATION: 99 % | TEMPERATURE: 97.4 F | BODY MASS INDEX: 35.2 KG/M2 | HEIGHT: 66 IN | DIASTOLIC BLOOD PRESSURE: 73 MMHG

## 2025-05-08 DIAGNOSIS — E11.65 TYPE 2 DIABETES MELLITUS WITH HYPERGLYCEMIA, WITH LONG-TERM CURRENT USE OF INSULIN (HCC): ICD-10-CM

## 2025-05-08 DIAGNOSIS — R07.89 ATYPICAL CHEST PAIN: ICD-10-CM

## 2025-05-08 DIAGNOSIS — Z79.4 TYPE 2 DIABETES MELLITUS WITH HYPERGLYCEMIA, WITH LONG-TERM CURRENT USE OF INSULIN (HCC): ICD-10-CM

## 2025-05-08 DIAGNOSIS — E66.09 CLASS 2 OBESITY DUE TO EXCESS CALORIES WITH BODY MASS INDEX (BMI) OF 36.0 TO 36.9 IN ADULT, UNSPECIFIED WHETHER SERIOUS COMORBIDITY PRESENT: ICD-10-CM

## 2025-05-08 DIAGNOSIS — R07.9 CHEST PAIN, UNSPECIFIED TYPE: Primary | ICD-10-CM

## 2025-05-08 DIAGNOSIS — E66.812 CLASS 2 OBESITY DUE TO EXCESS CALORIES WITH BODY MASS INDEX (BMI) OF 36.0 TO 36.9 IN ADULT, UNSPECIFIED WHETHER SERIOUS COMORBIDITY PRESENT: ICD-10-CM

## 2025-05-08 LAB
ALBUMIN SERPL BCG-MCNC: 3.9 G/DL (ref 3.4–4.9)
ALP SERPL-CCNC: 79 U/L (ref 40–129)
ALT SERPL W/O P-5'-P-CCNC: 11 U/L (ref 10–50)
ANION GAP SERPL CALC-SCNC: 13 MEQ/L (ref 8–16)
AST SERPL-CCNC: 22 U/L (ref 10–50)
BASOPHILS ABSOLUTE: 0.1 THOU/MM3 (ref 0–0.1)
BASOPHILS NFR BLD AUTO: 0.6 %
BILIRUB CONJ SERPL-MCNC: 0.2 MG/DL (ref 0–0.2)
BILIRUB SERPL-MCNC: 0.6 MG/DL (ref 0.3–1.2)
BUN SERPL-MCNC: 21 MG/DL (ref 8–23)
CALCIUM SERPL-MCNC: 9.6 MG/DL (ref 8.6–10)
CHLORIDE SERPL-SCNC: 99 MEQ/L (ref 98–111)
CO2 SERPL-SCNC: 26 MEQ/L (ref 22–29)
CREAT SERPL-MCNC: 1.1 MG/DL (ref 0.7–1.2)
D DIMER PPP IA.FEU-MCNC: 384 NG/ML FEU (ref 0–500)
DEPRECATED RDW RBC AUTO: 45.3 FL (ref 35–45)
EKG ATRIAL RATE: 98 BPM
EKG P AXIS: 49 DEGREES
EKG P-R INTERVAL: 226 MS
EKG Q-T INTERVAL: 350 MS
EKG QRS DURATION: 74 MS
EKG QTC CALCULATION (BAZETT): 446 MS
EKG R AXIS: 48 DEGREES
EKG T AXIS: 47 DEGREES
EKG VENTRICULAR RATE: 98 BPM
EOSINOPHIL NFR BLD AUTO: 3.1 %
EOSINOPHILS ABSOLUTE: 0.3 THOU/MM3 (ref 0–0.4)
ERYTHROCYTE [DISTWIDTH] IN BLOOD BY AUTOMATED COUNT: 13.9 % (ref 11.5–14.5)
GFR SERPL CREATININE-BSD FRML MDRD: 78 ML/MIN/1.73M2
GLUCOSE SERPL-MCNC: 307 MG/DL (ref 74–109)
HCT VFR BLD AUTO: 44.5 % (ref 42–52)
HGB BLD-MCNC: 15.4 GM/DL (ref 14–18)
IMM GRANULOCYTES # BLD AUTO: 0.06 THOU/MM3 (ref 0–0.07)
IMM GRANULOCYTES NFR BLD AUTO: 0.6 %
LYMPHOCYTES ABSOLUTE: 2.1 THOU/MM3 (ref 1–4.8)
LYMPHOCYTES NFR BLD AUTO: 20.6 %
MCH RBC QN AUTO: 31.2 PG (ref 26–33)
MCHC RBC AUTO-ENTMCNC: 34.6 GM/DL (ref 32.2–35.5)
MCV RBC AUTO: 90.1 FL (ref 80–94)
MONOCYTES ABSOLUTE: 0.6 THOU/MM3 (ref 0.4–1.3)
MONOCYTES NFR BLD AUTO: 6.4 %
NEUTROPHILS ABSOLUTE: 6.9 THOU/MM3 (ref 1.8–7.7)
NEUTROPHILS NFR BLD AUTO: 68.7 %
NRBC BLD AUTO-RTO: 0 /100 WBC
NT-PROBNP SERPL IA-MCNC: < 36 PG/ML (ref 0–124)
OSMOLALITY SERPL CALC.SUM OF ELEC: 290.2 MOSMOL/KG (ref 275–300)
PLATELET # BLD AUTO: 197 THOU/MM3 (ref 130–400)
PMV BLD AUTO: 9.7 FL (ref 9.4–12.4)
POTASSIUM SERPL-SCNC: 4.3 MEQ/L (ref 3.5–5.2)
PROT SERPL-MCNC: 7.5 G/DL (ref 6.4–8.3)
RBC # BLD AUTO: 4.94 MILL/MM3 (ref 4.7–6.1)
SODIUM SERPL-SCNC: 138 MEQ/L (ref 135–145)
TROPONIN, HIGH SENSITIVITY: 34 NG/L (ref 0–12)
TROPONIN, HIGH SENSITIVITY: 37 NG/L (ref 0–12)
WBC # BLD AUTO: 10.1 THOU/MM3 (ref 4.8–10.8)

## 2025-05-08 PROCEDURE — 71046 X-RAY EXAM CHEST 2 VIEWS: CPT

## 2025-05-08 PROCEDURE — 85025 COMPLETE CBC W/AUTO DIFF WBC: CPT

## 2025-05-08 PROCEDURE — 99285 EMERGENCY DEPT VISIT HI MDM: CPT

## 2025-05-08 PROCEDURE — 82248 BILIRUBIN DIRECT: CPT

## 2025-05-08 PROCEDURE — 80053 COMPREHEN METABOLIC PANEL: CPT

## 2025-05-08 PROCEDURE — 36415 COLL VENOUS BLD VENIPUNCTURE: CPT

## 2025-05-08 PROCEDURE — 83880 ASSAY OF NATRIURETIC PEPTIDE: CPT

## 2025-05-08 PROCEDURE — 93005 ELECTROCARDIOGRAM TRACING: CPT | Performed by: FAMILY MEDICINE

## 2025-05-08 PROCEDURE — 84484 ASSAY OF TROPONIN QUANT: CPT

## 2025-05-08 PROCEDURE — 93010 ELECTROCARDIOGRAM REPORT: CPT | Performed by: INTERNAL MEDICINE

## 2025-05-08 PROCEDURE — 6360000002 HC RX W HCPCS: Performed by: EMERGENCY MEDICINE

## 2025-05-08 PROCEDURE — 85379 FIBRIN DEGRADATION QUANT: CPT

## 2025-05-08 PROCEDURE — 6360000002 HC RX W HCPCS: Performed by: FAMILY MEDICINE

## 2025-05-08 RX ORDER — MORPHINE SULFATE 4 MG/ML
4 INJECTION, SOLUTION INTRAMUSCULAR; INTRAVENOUS ONCE
Status: COMPLETED | OUTPATIENT
Start: 2025-05-08 | End: 2025-05-08

## 2025-05-08 RX ORDER — KETOROLAC TROMETHAMINE 30 MG/ML
15 INJECTION, SOLUTION INTRAMUSCULAR; INTRAVENOUS ONCE
Status: COMPLETED | OUTPATIENT
Start: 2025-05-08 | End: 2025-05-08

## 2025-05-08 RX ORDER — DULAGLUTIDE 4.5 MG/.5ML
INJECTION, SOLUTION SUBCUTANEOUS
Qty: 6 ML | Refills: 1 | Status: SHIPPED | OUTPATIENT
Start: 2025-05-08

## 2025-05-08 RX ADMIN — KETOROLAC TROMETHAMINE 15 MG: 30 INJECTION, SOLUTION INTRAMUSCULAR at 17:04

## 2025-05-08 RX ADMIN — MORPHINE SULFATE 4 MG: 4 INJECTION, SOLUTION INTRAMUSCULAR; INTRAVENOUS at 14:51

## 2025-05-08 ASSESSMENT — PAIN DESCRIPTION - LOCATION
LOCATION: CHEST

## 2025-05-08 ASSESSMENT — PAIN SCALES - GENERAL
PAINLEVEL_OUTOF10: 6
PAINLEVEL_OUTOF10: 10
PAINLEVEL_OUTOF10: 5
PAINLEVEL_OUTOF10: 10
PAINLEVEL_OUTOF10: 10

## 2025-05-08 ASSESSMENT — HEART SCORE: ECG: NORMAL

## 2025-05-08 ASSESSMENT — PAIN - FUNCTIONAL ASSESSMENT
PAIN_FUNCTIONAL_ASSESSMENT: 0-10

## 2025-05-08 NOTE — ED TRIAGE NOTES
Pt presents to the ED for chest pain for a couple days that got worse today. Pt states he took 2 nitro at 0630 today with no relief. Pt also complaining of SOB.

## 2025-05-08 NOTE — ED PROVIDER NOTES
EMERGENCY DEPARTMENT ENCOUNTER     CHIEF COMPLAINT   Chief Complaint   Patient presents with    Chest Pain        HPI   Jamie Spangler is a 55 y.o. male with many medical co-morbidities including DM2 (s/p AKA), pancreatitis, CAD, who presents with chest pain without palpitations, onset was today. The duration has been constant. The trigger for the pain is none, though pt has chest pain frequently. Pt states the nitro didn't help so he came to be evaluated.    REVIEW OF SYSTEMS   Cardiac: +Chest Pain, Denies syncope   Respiratory: Denies cough or hemoptysis   GI: Denies Vomiting or Diarrhea   General: Denies Fever   All other review of systems are reviewed and are otherwise negative.     PAST MEDICAL & SURGICAL HISTORY   Past Medical History:   Diagnosis Date    Abscess of finger of right hand 03/17/2025    Acute osteomyelitis of shoulder region (HCC) 08/16/2017    Acute pancreatitis 12/01/2014    Atherosclerotic heart disease of native coronary artery with unspecified angina pectoris 01/18/2022    Bipolar 2 disorder ([facility])     previously followed with Dr. Taylor Dueñas and Augie Morel in [place]    BPH (benign prostatic hyperplasia)     Chronic diastolic congestive heart failure (HCC) 03/01/2023    Combined forms of age-related cataract of both eyes 12/26/2024    COPD (chronic obstructive pulmonary disease) ([facility])     Dehiscence of laceration wound of finger 03/17/2025    Diabetes mellitus type 2, uncontrolled     HgbA1c on 4/2/2019 was 9.1.    Diabetic peripheral neuropathy (HCC)     Diabetic polyneuropathy ([facility])     Diabetic ulcer of right foot associated with type 2 diabetes mellitus (HCC) 12/10/2015    Essential hypertension     \"never been on b/p medication that I know of\"    GERD (gastroesophageal reflux disease)     Hammer toe of left foot     Heart murmur     denies any chest pain or palpitations    History of coronary artery stent placement 03/17/2025    History of coronary artery stent placement

## 2025-05-08 NOTE — ED PROVIDER NOTES
[facility] EMERGENCY DEPARTMENT  TRANSFER OF CARE NOTE  EMERGENCY DEPARTMENT ENCOUNTER          Pt Name: Jamie Spangler  MRN: [id number]  Birthdate 1970  Date of encounter: 5/8/2025  Physician: Reji Marlow MD, FACEP, FAAEM, FAWM      Transfer of Care Information:   Physician Signing out: Yovany Kunz MD  Receiving Physician: Reji Marlow M.D.  Sign out time: 1600      Brief history:  Patient with history of CAD and chronic intermittent chest pain with chronic small troponin elevation, complaining of changes in his pain since earlier this morning    Items pending that need to be checked:  Repeat troponin      Tentative Impression of patient:  Atypical chest pain    Expected disposition of patient:  Pending results, discharged.        Additional Assessment and results:   I have personally performed a face to face diagnostic evaluation on this patient. The patient's initial evaluation and plan have been discussed with the prior physician who initially evaluated the patient. Nursing Notes, Past Medical Hx, Past Surgical Hx, Social Hx, Allergies, vital signs and Family Hx were all reviewed.      Vitals:    05/08/25 1629   BP: (!) 157/73   Pulse: 84   Resp: 18   Temp:    SpO2: 99%     Physical Exam      Labs Reviewed   CBC WITH AUTO DIFFERENTIAL - Abnormal; Notable for the following components:       Result Value    RDW-SD 45.3 (*)     All other components within normal limits   BASIC METABOLIC PANEL - Abnormal; Notable for the following components:    Glucose 307 (*)     All other components within normal limits   TROPONIN - Abnormal; Notable for the following components:    Troponin, High Sensitivity 37 (*)     All other components within normal limits   TROPONIN - Abnormal; Notable for the following components:    Troponin, High Sensitivity 34 (*)     All other components within normal limits   D-DIMER, QUANTITATIVE   HEPATIC FUNCTION PANEL   BRAIN NATRIURETIC PEPTIDE   ANION GAP   GLOMERULAR

## 2025-05-08 NOTE — PROGRESS NOTES
Discharge instructions discussed with the patient. Verbalized understanding. Discharge complete. Off unit by w/c to [facility] transport. The patient states that he will leave the catheter in until Monday 8- to have DR Paris remove it. 36.5

## 2025-05-08 NOTE — DISCHARGE INSTR - COC
Continuity of Care Form    Patient Name: Jamie Spangler   :  1970  MRN:  [id number]    Admit date:  2025  Discharge date:  ***    Code Status Order: Prior   Advance Directives:     Admitting Physician:  No admitting provider for patient encounter.  PCP: Phuc Ortega MD    Discharging Nurse: ***  Discharging Hospital Unit/Room#: 08008A  Discharging Unit Phone Number: ***    Emergency Contact:   Extended Emergency Contact Information  Primary Emergency Contact: GiorgiFrantz   [place]  Mobile Phone: [phone]  Relation: Aunt/Uncle    Past Surgical History:  Past Surgical History:   Procedure Laterality Date    ABDOMEN SURGERY      ABSCESS DRAINAGE Right     foot    AMPUTATION ABOVE KNEE Right 2019    RIGHT ABOVE KNEE AMPUTATION performed by Rey Hanna MD at [facility] OR    CARDIAC PROCEDURE N/A 2025    Left heart cath / coronary angiography performed by Hakan Baugh MD at [facility] CARDIAC CATH LAB    CHOLECYSTECTOMY, LAPAROSCOPIC N/A 2020    ROBOTIC CHOLECYSTECTOMY performed by Aranza Zapata MD at [facility] OR    COLONOSCOPY      COLONOSCOPY N/A 2023    COLONOSCOPY performed by Miguel Hernandez MD at [facility] ENDOSCOPY    COLONOSCOPY N/A 10/13/2023    COLONOSCOPY performed by Miguel Hernandez MD at [facility] ENDOSCOPY    COLONOSCOPY N/A 10/30/2023    COLONOSCOPY WITH BIOPSY performed by Miguel Hernandez MD at [facility] ENDOSCOPY    CYSTOSCOPY N/A 2020    CYSTOSCOPY performed by Jason Paris Jr., MD at [facility] OR    CYSTOSCOPY N/A 2020    CYSTOSCOPY, UROLIFT performed by Jason Paris Jr., MD at [facility] OR    DILATATION, ESOPHAGUS      ENDOSCOPY, COLON, DIAGNOSTIC      FOOT DEBRIDEMENT Right 2016    I & D    GASTROCNEMIUS RECESSION Left 2021    LEFT LEG GASTROCS RECESSION performed by Osvaldo Dodge MD at [facility] SURGERY CENTER OR    INCISION AND DRAINAGE Right 2019    I&D RIGHT STUMP performed by Rey Hanna MD at [facility] OR    LEG AMPUTATION BELOW KNEE

## 2025-05-08 NOTE — CARE COORDINATION
Spoke with patient about RPM program. Patient would like to due but unable to get kit home with power chair. Called and spoke with Melisa Corrales who will order kit and have it sent to patients home. Verified patients address and telephone number in chart.

## 2025-05-09 ENCOUNTER — CARE COORDINATION (OUTPATIENT)
Dept: CARE COORDINATION | Age: 55
End: 2025-05-09

## 2025-05-09 NOTE — CARE COORDINATION
Call received from Melisa becerra regarding order request for RPM.  Pt was seen in ED 5/8 but requested that RPM be delivered to his home instead of him taking one from the ED.         Remote Patient Monitoring Enrollment Note      Date/Time:  5/9/2025 10:38 AM    Offered patient enrollment in the [facility] Remote Patient Monitoring (RPM) program for in home monitoring for CHF; condition managed by Dr. Hong. Diabetes; condition managed by PCP. HTN; condition managed by PCP..  Patient accepted RPM services.    Patient will be monitoring the following daily:  blood pressure reading and glucose reading  -pt is R AKA and is currently not using his prosthesis.    AC reviewed the information below with patient:    Emergency Contact (name and contact number): unsure at this time.     [x] A member from the care coordination team will reach out to notify the patient once the RPM kit is ordered.   [x] Once the kit is delivered, the HRS team will contact the patient after UPS deliver to assist with set up.            [x] Determined BP cuff size: large (13.8\"-19.68\")      [x] Determined weight scale: unable to weigh as pt is not using prosthesis at this time.                                                [x] Hours of ACM monitoring - Monday-Friday [phone].   [x] It is important to take your vitals every day, even on the weekends,to keep your care team aware of how you are doing every day of the week.                  All questions about RPM program answered at this time.

## 2025-05-09 NOTE — CARE COORDINATION
Ambulatory Care Coordination Note     5/9/2025 10:36 AM     Patient outreach attempt by this ACM today to perform care management follow up . ACM was unable to reach the patient by telephone today;   left voice message requesting a return phone call to this ACM.     ACM: Agatha Mercado RN     Care Summary Note:  Jamie was referred to care coordination by CTN following recent hospitalization.  Pt was admitted for CP.    Pt has h/o: CHF, HTN, CKD, DM, PVD, bipolar, Right AKA, CAD, BPH, hypothyroidism.      PCP/Specialist follow up:   Future Appointments         Provider Specialty Dept Phone    5/19/2025 9:30 AM Eleuterio Marrero, KD Physical Therapy [phone]    6/24/2025 10:15 AM Phuc Ortega MD Internal Medicine [phone]    9[facility]18/2025 10:00 [facility] [phone]    10/23/2025 11:15 AM David Hong MD Cardiology [phone]            Follow Up:   Plan for next ACM outreach in approximately 1 week to complete:  - CC Protocol assessments  - disease specific assessments  - goal progression  - education   - RPM.

## 2025-05-12 ENCOUNTER — CARE COORDINATION (OUTPATIENT)
Dept: PRIMARY CARE CLINIC | Age: 55
End: 2025-05-12

## 2025-05-12 NOTE — CARE COORDINATION
Kit Order   Remote Patient Kit Ordering Note      Date/Time:  5/12/2025 9:06 AM      [facility]family [facility] to notify of RPM kit order; patient/family was available; discussed the following topics below and all questions answered.    [x] [facility]  [x] Patient will receive package over the next 1-3 business days. Someone 21 years or older must be present to sign for UPS delivery.  [] HRS will contact patient within 24 hours, an HRS  will call the patient directly: If the patient does not answer, HRS will follow up with the clinical team notifying them about the unsuccessful attempt to contact the patient. HRS will make three call attempts to the patient.Provide patient with [facility] Virtual install number is: [phone].  [] The RPM Nurse will contact patient once equipment is active to welcome them to the program.                                                         [] Hours of RPM monitoring - Monday-Friday [phone]; encourage patient to get vitals entered by Noon each day to have the alert addressed same day.  [x][facility] mailed RPM Patient flyer to patient.                      ACM made aware the RPM kit has been ordered.

## 2025-05-15 ENCOUNTER — CARE COORDINATION (OUTPATIENT)
Dept: CARE COORDINATION | Age: 55
End: 2025-05-15

## 2025-05-15 NOTE — CARE COORDINATION
Ambulatory Care Coordination Note     5/15/2025 1:01 PM     Patient outreach attempt by this ACM today to perform care management follow up . ACM was unable to reach the patient by telephone today;   left voice message requesting a return phone call to this ACM.     ACM: Agatha Mercado RN     Care Summary Note:   Jamie was referred to care coordination by CTN following recent hospitalization.  Pt was admitted for CP.    Pt has h/o: CHF, HTN, CKD, DM, PVD, bipolar, Right AKA, CAD, BPH, hypothyroidism.      PCP/Specialist follow up:   Future Appointments         Provider Specialty Dept Phone    5/19/2025 9:30 AM Eleuterio Marrero, PT Physical Therapy [phone]    6/24/2025 10:15 AM Phuc Ortega MD Internal Medicine [phone]    [facility]2025 10:00 [facility] [phone]    10/23/2025 11:15 AM David Hong MD Cardiology [phone]            Follow Up:   Plan for next ACM outreach in approximately 1 week to complete:  - CC Protocol assessments  - disease specific assessments  - goal progression  - education   - RPM. -f/u to see if equipment received as pt has not activated.   -complete CHF shane prieto in ed.

## 2025-05-21 ENCOUNTER — CARE COORDINATION (OUTPATIENT)
Dept: CARE COORDINATION | Age: 55
End: 2025-05-21

## 2025-05-21 NOTE — CARE COORDINATION
Ambulatory Care Coordination Note     5/21/2025 10:32 AM     Patient outreach attempt by this ACM today to perform care management follow up . ACM was unable to reach the patient by telephone today;   left voice message requesting a return phone call to this ACM.     ACM: Agatha Mercado RN     Care Summary Note:  Jamie was referred to care coordination by CTN following recent hospitalization.  Pt was admitted for CP.    Pt has h/o: CHF, HTN, CKD, DM, PVD, bipolar, Right AKA, CAD, BPH, hypothyroidism.      PCP/Specialist follow up:   Future Appointments         Provider Specialty Dept Phone    6/3/2025 1:45 PM Eleuterio Marrero, PT Physical Therapy [phone]    6/24/2025 10:15 AM Phuc Ortega MD Internal Medicine [phone]    [facility]18/2025 10:00 [facility] [phone]    10/23/2025 11:15 AM David Hong MD Cardiology [phone]            Follow Up:   Plan for next ACM outreach in approximately 1 week to complete:  - CC Protocol assessments  - disease specific assessments  - goal progression  - education   - RPM-pt preactivated.

## 2025-05-29 ENCOUNTER — CARE COORDINATION (OUTPATIENT)
Dept: CARE COORDINATION | Age: 55
End: 2025-05-29

## 2025-05-29 ENCOUNTER — HOSPITAL ENCOUNTER (INPATIENT)
Age: 55
LOS: 4 days | Discharge: HOME | DRG: 439 | End: 2025-06-02
Attending: STUDENT IN AN ORGANIZED HEALTH CARE EDUCATION/TRAINING PROGRAM | Admitting: STUDENT IN AN ORGANIZED HEALTH CARE EDUCATION/TRAINING PROGRAM
Payer: MEDICARE

## 2025-05-29 ENCOUNTER — APPOINTMENT (OUTPATIENT)
Dept: CT IMAGING | Age: 55
DRG: 439 | End: 2025-05-29
Payer: MEDICARE

## 2025-05-29 DIAGNOSIS — R74.8 ABNORMAL SERUM LEVEL OF LIPASE: ICD-10-CM

## 2025-05-29 DIAGNOSIS — I10 ESSENTIAL HYPERTENSION: ICD-10-CM

## 2025-05-29 DIAGNOSIS — K85.90 ACUTE PANCREATITIS WITHOUT INFECTION OR NECROSIS, UNSPECIFIED PANCREATITIS TYPE: Primary | ICD-10-CM

## 2025-05-29 LAB
ALBUMIN SERPL BCG-MCNC: 4.1 G/DL (ref 3.4–4.9)
ALP SERPL-CCNC: 80 U/L (ref 40–129)
ALT SERPL W/O P-5'-P-CCNC: 13 U/L (ref 10–50)
ANION GAP SERPL CALC-SCNC: 17 MEQ/L (ref 8–16)
AST SERPL-CCNC: 23 U/L (ref 10–50)
BASOPHILS ABSOLUTE: 0.1 THOU/MM3 (ref 0–0.1)
BASOPHILS NFR BLD AUTO: 0.6 %
BILIRUB SERPL-MCNC: 0.5 MG/DL (ref 0.3–1.2)
BUN SERPL-MCNC: 12 MG/DL (ref 8–23)
CALCIUM SERPL-MCNC: 9.1 MG/DL (ref 8.6–10)
CHLORIDE SERPL-SCNC: 96 MEQ/L (ref 98–111)
CO2 SERPL-SCNC: 21 MEQ/L (ref 22–29)
CREAT SERPL-MCNC: 1 MG/DL (ref 0.7–1.2)
DEPRECATED RDW RBC AUTO: 45.1 FL (ref 35–45)
EKG ATRIAL RATE: 86 BPM
EKG P AXIS: 12 DEGREES
EKG P-R INTERVAL: 194 MS
EKG Q-T INTERVAL: 362 MS
EKG QRS DURATION: 80 MS
EKG QTC CALCULATION (BAZETT): 433 MS
EKG R AXIS: 24 DEGREES
EKG T AXIS: 2 DEGREES
EKG VENTRICULAR RATE: 86 BPM
EOSINOPHIL NFR BLD AUTO: 3.4 %
EOSINOPHILS ABSOLUTE: 0.4 THOU/MM3 (ref 0–0.4)
ERYTHROCYTE [DISTWIDTH] IN BLOOD BY AUTOMATED COUNT: 13.9 % (ref 11.5–14.5)
ETHANOL SERPL-MCNC: < 0.01 % (ref 0–0.08)
GFR SERPL CREATININE-BSD FRML MDRD: 88 ML/MIN/1.73M2
GLUCOSE SERPL-MCNC: 393 MG/DL (ref 74–109)
HCT VFR BLD AUTO: 44.5 % (ref 42–52)
HGB BLD-MCNC: 15.3 GM/DL (ref 14–18)
IMM GRANULOCYTES # BLD AUTO: 0.08 THOU/MM3 (ref 0–0.07)
IMM GRANULOCYTES NFR BLD AUTO: 0.7 %
LIPASE SERPL-CCNC: 316 U/L (ref 13–60)
LYMPHOCYTES ABSOLUTE: 3.3 THOU/MM3 (ref 1–4.8)
LYMPHOCYTES NFR BLD AUTO: 29.4 %
MCH RBC QN AUTO: 31 PG (ref 26–33)
MCHC RBC AUTO-ENTMCNC: 34.4 GM/DL (ref 32.2–35.5)
MCV RBC AUTO: 90.1 FL (ref 80–94)
MONOCYTES ABSOLUTE: 0.9 THOU/MM3 (ref 0.4–1.3)
MONOCYTES NFR BLD AUTO: 7.9 %
NEUTROPHILS ABSOLUTE: 6.4 THOU/MM3 (ref 1.8–7.7)
NEUTROPHILS NFR BLD AUTO: 58 %
NRBC BLD AUTO-RTO: 0 /100 WBC
OSMOLALITY SERPL CALC.SUM OF ELEC: 284.4 MOSMOL/KG (ref 275–300)
PLATELET # BLD AUTO: 200 THOU/MM3 (ref 130–400)
PMV BLD AUTO: 10.1 FL (ref 9.4–12.4)
POTASSIUM SERPL-SCNC: 3.9 MEQ/L (ref 3.5–5.2)
PROT SERPL-MCNC: 7.4 G/DL (ref 6.4–8.3)
RBC # BLD AUTO: 4.94 MILL/MM3 (ref 4.7–6.1)
SODIUM SERPL-SCNC: 134 MEQ/L (ref 135–145)
TROPONIN, HIGH SENSITIVITY: 29 NG/L (ref 0–12)
WBC # BLD AUTO: 11.1 THOU/MM3 (ref 4.8–10.8)

## 2025-05-29 PROCEDURE — 36415 COLL VENOUS BLD VENIPUNCTURE: CPT

## 2025-05-29 PROCEDURE — 80053 COMPREHEN METABOLIC PANEL: CPT

## 2025-05-29 PROCEDURE — 1200000003 HC TELEMETRY R&B

## 2025-05-29 PROCEDURE — 99285 EMERGENCY DEPT VISIT HI MDM: CPT

## 2025-05-29 PROCEDURE — 85025 COMPLETE CBC W/AUTO DIFF WBC: CPT

## 2025-05-29 PROCEDURE — 93005 ELECTROCARDIOGRAM TRACING: CPT | Performed by: EMERGENCY MEDICINE

## 2025-05-29 PROCEDURE — 6370000000 HC RX 637 (ALT 250 FOR IP): Performed by: STUDENT IN AN ORGANIZED HEALTH CARE EDUCATION/TRAINING PROGRAM

## 2025-05-29 PROCEDURE — 83690 ASSAY OF LIPASE: CPT

## 2025-05-29 PROCEDURE — 82077 ASSAY SPEC XCP UR&BREATH IA: CPT

## 2025-05-29 PROCEDURE — 74177 CT ABD & PELVIS W/CONTRAST: CPT

## 2025-05-29 PROCEDURE — 6360000004 HC RX CONTRAST MEDICATION: Performed by: STUDENT IN AN ORGANIZED HEALTH CARE EDUCATION/TRAINING PROGRAM

## 2025-05-29 PROCEDURE — 84484 ASSAY OF TROPONIN QUANT: CPT

## 2025-05-29 RX ORDER — IOPAMIDOL 755 MG/ML
80 INJECTION, SOLUTION INTRAVASCULAR
Status: COMPLETED | OUTPATIENT
Start: 2025-05-29 | End: 2025-05-29

## 2025-05-29 RX ORDER — METOPROLOL TARTRATE 25 MG/1
25 TABLET, FILM COATED ORAL 2 TIMES DAILY
Qty: 180 TABLET | Refills: 4 | OUTPATIENT
Start: 2025-05-29

## 2025-05-29 RX ADMIN — IOPAMIDOL 80 ML: 755 INJECTION, SOLUTION INTRAVENOUS at 21:34

## 2025-05-29 RX ADMIN — Medication 6 UNITS: at 21:40

## 2025-05-29 ASSESSMENT — PAIN SCALES - GENERAL: PAINLEVEL_OUTOF10: 9

## 2025-05-29 ASSESSMENT — PAIN - FUNCTIONAL ASSESSMENT: PAIN_FUNCTIONAL_ASSESSMENT: 0-10

## 2025-05-29 ASSESSMENT — PAIN DESCRIPTION - LOCATION: LOCATION: ABDOMEN

## 2025-05-29 ASSESSMENT — PAIN DESCRIPTION - ORIENTATION: ORIENTATION: MID

## 2025-05-29 NOTE — CARE COORDINATION
Attempted to reach patient for continued Care Coordination follow up and education.  Patient was unavailable at the time of my call, and a generic voicemail message was left asking patient to return my call at [phone].

## 2025-05-29 NOTE — ED TRIAGE NOTES
Pt presents to ed from home with c/c of constipation and abdominal pain. States he had an eeg a week ago and was told he had a lot in his stomach. Pt has been taking laxatives and doing home enemas to relieve constipation. Pain is  9/10, has been taking aspirin for pain. Rr easy and unlabored. No distress noted.

## 2025-05-30 LAB
GLUCOSE BLD STRIP.AUTO-MCNC: 126 MG/DL (ref 70–108)
GLUCOSE BLD STRIP.AUTO-MCNC: 155 MG/DL (ref 70–108)
GLUCOSE BLD STRIP.AUTO-MCNC: 171 MG/DL (ref 70–108)
GLUCOSE BLD STRIP.AUTO-MCNC: 196 MG/DL (ref 70–108)
GLUCOSE BLD STRIP.AUTO-MCNC: 292 MG/DL (ref 70–108)
GLUCOSE BLD STRIP.AUTO-MCNC: 301 MG/DL (ref 70–108)
GLUCOSE BLD STRIP.AUTO-MCNC: 341 MG/DL (ref 70–108)
TROPONIN, HIGH SENSITIVITY: 23 NG/L (ref 0–12)

## 2025-05-30 PROCEDURE — 6370000000 HC RX 637 (ALT 250 FOR IP)

## 2025-05-30 PROCEDURE — 1200000003 HC TELEMETRY R&B

## 2025-05-30 PROCEDURE — 2500000003 HC RX 250 WO HCPCS

## 2025-05-30 PROCEDURE — 99233 SBSQ HOSP IP/OBS HIGH 50: CPT | Performed by: STUDENT IN AN ORGANIZED HEALTH CARE EDUCATION/TRAINING PROGRAM

## 2025-05-30 PROCEDURE — 36415 COLL VENOUS BLD VENIPUNCTURE: CPT

## 2025-05-30 PROCEDURE — 6360000002 HC RX W HCPCS

## 2025-05-30 PROCEDURE — 84484 ASSAY OF TROPONIN QUANT: CPT

## 2025-05-30 PROCEDURE — 2580000003 HC RX 258

## 2025-05-30 PROCEDURE — 82948 REAGENT STRIP/BLOOD GLUCOSE: CPT

## 2025-05-30 RX ORDER — METOCLOPRAMIDE 10 MG/1
10 TABLET ORAL 2 TIMES DAILY
Status: DISCONTINUED | OUTPATIENT
Start: 2025-05-30 | End: 2025-06-02 | Stop reason: HOSPADM

## 2025-05-30 RX ORDER — ISOSORBIDE MONONITRATE 60 MG/1
60 TABLET, EXTENDED RELEASE ORAL DAILY
Status: DISCONTINUED | OUTPATIENT
Start: 2025-05-30 | End: 2025-05-30

## 2025-05-30 RX ORDER — POTASSIUM CHLORIDE 7.45 MG/ML
10 INJECTION INTRAVENOUS PRN
Status: DISCONTINUED | OUTPATIENT
Start: 2025-05-30 | End: 2025-06-02 | Stop reason: HOSPADM

## 2025-05-30 RX ORDER — FUROSEMIDE 40 MG/1
20 TABLET ORAL DAILY
Status: DISCONTINUED | OUTPATIENT
Start: 2025-05-30 | End: 2025-05-30

## 2025-05-30 RX ORDER — DEXTROSE MONOHYDRATE 100 MG/ML
INJECTION, SOLUTION INTRAVENOUS CONTINUOUS PRN
Status: DISCONTINUED | OUTPATIENT
Start: 2025-05-30 | End: 2025-06-02 | Stop reason: HOSPADM

## 2025-05-30 RX ORDER — ASPIRIN 81 MG/1
81 TABLET ORAL DAILY
Status: DISCONTINUED | OUTPATIENT
Start: 2025-05-30 | End: 2025-06-02 | Stop reason: HOSPADM

## 2025-05-30 RX ORDER — METOPROLOL TARTRATE 25 MG/1
25 TABLET, FILM COATED ORAL 2 TIMES DAILY
Status: DISCONTINUED | OUTPATIENT
Start: 2025-05-30 | End: 2025-06-02 | Stop reason: HOSPADM

## 2025-05-30 RX ORDER — ONDANSETRON 2 MG/ML
4 INJECTION INTRAMUSCULAR; INTRAVENOUS EVERY 6 HOURS PRN
Status: DISCONTINUED | OUTPATIENT
Start: 2025-05-30 | End: 2025-06-02 | Stop reason: HOSPADM

## 2025-05-30 RX ORDER — GABAPENTIN 400 MG/1
400 CAPSULE ORAL 3 TIMES DAILY
Status: DISCONTINUED | OUTPATIENT
Start: 2025-05-30 | End: 2025-05-30

## 2025-05-30 RX ORDER — MAGNESIUM SULFATE IN WATER 40 MG/ML
2000 INJECTION, SOLUTION INTRAVENOUS PRN
Status: DISCONTINUED | OUTPATIENT
Start: 2025-05-30 | End: 2025-06-02 | Stop reason: HOSPADM

## 2025-05-30 RX ORDER — DULOXETIN HYDROCHLORIDE 30 MG/1
30 CAPSULE, DELAYED RELEASE ORAL DAILY
Status: DISCONTINUED | OUTPATIENT
Start: 2025-05-30 | End: 2025-06-02 | Stop reason: HOSPADM

## 2025-05-30 RX ORDER — GABAPENTIN 400 MG/1
400 CAPSULE ORAL 3 TIMES DAILY
Status: DISCONTINUED | OUTPATIENT
Start: 2025-05-30 | End: 2025-06-02 | Stop reason: HOSPADM

## 2025-05-30 RX ORDER — FUROSEMIDE 40 MG/1
20 TABLET ORAL DAILY
Status: DISCONTINUED | OUTPATIENT
Start: 2025-05-30 | End: 2025-06-02 | Stop reason: HOSPADM

## 2025-05-30 RX ORDER — ENOXAPARIN SODIUM 100 MG/ML
40 INJECTION SUBCUTANEOUS DAILY
Status: DISCONTINUED | OUTPATIENT
Start: 2025-05-30 | End: 2025-06-02 | Stop reason: HOSPADM

## 2025-05-30 RX ORDER — GLUCAGON 1 MG/ML
1 KIT INJECTION PRN
Status: DISCONTINUED | OUTPATIENT
Start: 2025-05-30 | End: 2025-06-02 | Stop reason: HOSPADM

## 2025-05-30 RX ORDER — ONDANSETRON 4 MG/1
4 TABLET, ORALLY DISINTEGRATING ORAL EVERY 8 HOURS PRN
Status: DISCONTINUED | OUTPATIENT
Start: 2025-05-30 | End: 2025-06-02 | Stop reason: HOSPADM

## 2025-05-30 RX ORDER — POTASSIUM CHLORIDE 29.8 MG/ML
20 INJECTION INTRAVENOUS PRN
Status: DISCONTINUED | OUTPATIENT
Start: 2025-05-30 | End: 2025-05-30

## 2025-05-30 RX ORDER — SODIUM CHLORIDE 0.9 % (FLUSH) 0.9 %
5-40 SYRINGE (ML) INJECTION EVERY 12 HOURS SCHEDULED
Status: DISCONTINUED | OUTPATIENT
Start: 2025-05-30 | End: 2025-06-02 | Stop reason: HOSPADM

## 2025-05-30 RX ORDER — INSULIN LISPRO 100 [IU]/ML
12 INJECTION, SOLUTION INTRAVENOUS; SUBCUTANEOUS
Status: DISCONTINUED | OUTPATIENT
Start: 2025-05-30 | End: 2025-05-30

## 2025-05-30 RX ORDER — INSULIN GLARGINE 100 [IU]/ML
24 INJECTION, SOLUTION SUBCUTANEOUS 2 TIMES DAILY
Status: DISCONTINUED | OUTPATIENT
Start: 2025-05-30 | End: 2025-06-02

## 2025-05-30 RX ORDER — ASCORBIC ACID 500 MG
500 TABLET ORAL DAILY
Status: DISCONTINUED | OUTPATIENT
Start: 2025-05-30 | End: 2025-06-02 | Stop reason: HOSPADM

## 2025-05-30 RX ORDER — BISACODYL 5 MG/1
5 TABLET, DELAYED RELEASE ORAL DAILY PRN
Status: DISCONTINUED | OUTPATIENT
Start: 2025-05-30 | End: 2025-06-02 | Stop reason: HOSPADM

## 2025-05-30 RX ORDER — ATORVASTATIN CALCIUM 80 MG/1
80 TABLET, FILM COATED ORAL NIGHTLY
Status: DISCONTINUED | OUTPATIENT
Start: 2025-05-30 | End: 2025-06-02 | Stop reason: HOSPADM

## 2025-05-30 RX ORDER — SODIUM CHLORIDE 9 MG/ML
INJECTION, SOLUTION INTRAVENOUS CONTINUOUS
Status: DISCONTINUED | OUTPATIENT
Start: 2025-05-30 | End: 2025-06-01

## 2025-05-30 RX ORDER — RANOLAZINE 500 MG/1
500 TABLET, EXTENDED RELEASE ORAL 2 TIMES DAILY
Status: DISCONTINUED | OUTPATIENT
Start: 2025-05-30 | End: 2025-06-02 | Stop reason: HOSPADM

## 2025-05-30 RX ORDER — SODIUM CHLORIDE 0.9 % (FLUSH) 0.9 %
5-40 SYRINGE (ML) INJECTION PRN
Status: DISCONTINUED | OUTPATIENT
Start: 2025-05-30 | End: 2025-06-02 | Stop reason: HOSPADM

## 2025-05-30 RX ORDER — LISINOPRIL 5 MG/1
5 TABLET ORAL DAILY
Status: DISCONTINUED | OUTPATIENT
Start: 2025-05-30 | End: 2025-06-02 | Stop reason: HOSPADM

## 2025-05-30 RX ORDER — INSULIN LISPRO 100 [IU]/ML
0-4 INJECTION, SOLUTION INTRAVENOUS; SUBCUTANEOUS
Status: DISCONTINUED | OUTPATIENT
Start: 2025-05-30 | End: 2025-06-02 | Stop reason: HOSPADM

## 2025-05-30 RX ORDER — ISOSORBIDE MONONITRATE 60 MG/1
60 TABLET, EXTENDED RELEASE ORAL DAILY
Status: DISCONTINUED | OUTPATIENT
Start: 2025-05-30 | End: 2025-06-02 | Stop reason: HOSPADM

## 2025-05-30 RX ORDER — SENNOSIDES 8.6 MG/1
1 TABLET ORAL NIGHTLY
Status: DISCONTINUED | OUTPATIENT
Start: 2025-05-30 | End: 2025-06-02 | Stop reason: HOSPADM

## 2025-05-30 RX ORDER — ENOXAPARIN SODIUM 100 MG/ML
40 INJECTION SUBCUTANEOUS DAILY
Status: DISCONTINUED | OUTPATIENT
Start: 2025-05-30 | End: 2025-05-30

## 2025-05-30 RX ORDER — DULOXETIN HYDROCHLORIDE 30 MG/1
30 CAPSULE, DELAYED RELEASE ORAL DAILY
Status: DISCONTINUED | OUTPATIENT
Start: 2025-05-30 | End: 2025-05-30

## 2025-05-30 RX ORDER — SODIUM CHLORIDE 9 MG/ML
INJECTION, SOLUTION INTRAVENOUS PRN
Status: DISCONTINUED | OUTPATIENT
Start: 2025-05-30 | End: 2025-06-02 | Stop reason: HOSPADM

## 2025-05-30 RX ORDER — PANTOPRAZOLE SODIUM 40 MG/1
40 TABLET, DELAYED RELEASE ORAL
Status: DISCONTINUED | OUTPATIENT
Start: 2025-05-30 | End: 2025-06-02 | Stop reason: HOSPADM

## 2025-05-30 RX ADMIN — GABAPENTIN 400 MG: 400 CAPSULE ORAL at 08:02

## 2025-05-30 RX ADMIN — INSULIN LISPRO 1 UNITS: 100 INJECTION, SOLUTION INTRAVENOUS; SUBCUTANEOUS at 12:01

## 2025-05-30 RX ADMIN — ENOXAPARIN SODIUM 40 MG: 100 INJECTION SUBCUTANEOUS at 08:02

## 2025-05-30 RX ADMIN — INSULIN GLARGINE 24 UNITS: 100 INJECTION, SOLUTION SUBCUTANEOUS at 02:01

## 2025-05-30 RX ADMIN — INSULIN LISPRO 3 UNITS: 100 INJECTION, SOLUTION INTRAVENOUS; SUBCUTANEOUS at 02:01

## 2025-05-30 RX ADMIN — SODIUM CHLORIDE: 0.9 INJECTION, SOLUTION INTRAVENOUS at 01:02

## 2025-05-30 RX ADMIN — INSULIN GLARGINE 24 UNITS: 100 INJECTION, SOLUTION SUBCUTANEOUS at 22:17

## 2025-05-30 RX ADMIN — ASPIRIN 81 MG: 81 TABLET, COATED ORAL at 08:02

## 2025-05-30 RX ADMIN — INSULIN GLARGINE 24 UNITS: 100 INJECTION, SOLUTION SUBCUTANEOUS at 08:01

## 2025-05-30 RX ADMIN — ISOSORBIDE MONONITRATE 60 MG: 60 TABLET, EXTENDED RELEASE ORAL at 02:02

## 2025-05-30 RX ADMIN — METOCLOPRAMIDE 10 MG: 10 TABLET ORAL at 22:18

## 2025-05-30 RX ADMIN — PANTOPRAZOLE SODIUM 40 MG: 40 TABLET, DELAYED RELEASE ORAL at 16:08

## 2025-05-30 RX ADMIN — METOCLOPRAMIDE 10 MG: 10 TABLET ORAL at 11:10

## 2025-05-30 RX ADMIN — INSULIN LISPRO 3 UNITS: 100 INJECTION, SOLUTION INTRAVENOUS; SUBCUTANEOUS at 08:01

## 2025-05-30 RX ADMIN — SODIUM CHLORIDE: 0.9 INJECTION, SOLUTION INTRAVENOUS at 11:08

## 2025-05-30 RX ADMIN — GABAPENTIN 400 MG: 400 CAPSULE ORAL at 22:18

## 2025-05-30 RX ADMIN — DULOXETINE 30 MG: 30 CAPSULE, DELAYED RELEASE ORAL at 08:02

## 2025-05-30 RX ADMIN — ATORVASTATIN CALCIUM 80 MG: 80 TABLET, FILM COATED ORAL at 22:20

## 2025-05-30 RX ADMIN — GABAPENTIN 400 MG: 400 CAPSULE ORAL at 13:34

## 2025-05-30 RX ADMIN — RANOLAZINE 500 MG: 500 TABLET, EXTENDED RELEASE ORAL at 08:02

## 2025-05-30 RX ADMIN — METOPROLOL TARTRATE 25 MG: 25 TABLET, FILM COATED ORAL at 22:18

## 2025-05-30 RX ADMIN — RANOLAZINE 500 MG: 500 TABLET, EXTENDED RELEASE ORAL at 22:17

## 2025-05-30 RX ADMIN — OXYCODONE HYDROCHLORIDE AND ACETAMINOPHEN 500 MG: 500 TABLET ORAL at 08:02

## 2025-05-30 RX ADMIN — SODIUM CHLORIDE, PRESERVATIVE FREE 10 ML: 5 INJECTION INTRAVENOUS at 22:18

## 2025-05-30 RX ADMIN — SODIUM CHLORIDE: 0.9 INJECTION, SOLUTION INTRAVENOUS at 21:08

## 2025-05-30 RX ADMIN — SENNOSIDES 8.6 MG: 8.6 TABLET, FILM COATED ORAL at 02:00

## 2025-05-30 RX ADMIN — METOPROLOL TARTRATE 25 MG: 25 TABLET, FILM COATED ORAL at 02:02

## 2025-05-30 ASSESSMENT — PAIN SCALES - GENERAL
PAINLEVEL_OUTOF10: 0

## 2025-05-30 NOTE — ED PROVIDER NOTES
constipation, testicular torsion, epididymitis/ orchitis              Pertinent Comorbid Conditions:        2)  Data Reviewed (none if blank or additional interpretation can be found in ED Course)          My Independent interpretations:     EKG:      Normal sinus rhythm with QTc of 433, no STEMI    Imaging: CT abdomen showing no acute intra-abdominal process    Labs:      Troponin mildly improved, lipase significantly elevated at 316, hyperglycemia, mild leukocytosis at 11.1, no anemia                 Decision Rules/Clinical Scores utilized:              External Documentation Reviewed:   Previous patient encounter documents & history available on EMR was reviewed as available.      3)  Treatment and Disposition         ED Reassessment: Patient stable while in the ED         Case discussed with consulting clinician: Discussed case with hospitalist team for admission         Shared Decision-Making was performed and disposition discussed with the patient/caregiver at bedside with all questions answered.          Social determinants of health impacting treatment or disposition:          Code Status:  Full code      Summary of Patient Presentation:      MDM  Number of Diagnoses or Management Options  Acute pancreatitis without infection or necrosis, unspecified pancreatitis type: new, needed workup  Diagnosis management comments: 55-year-old male presented to the ED for abdominal pain and concern for constipation and gastric retention.  Patient denying any alcohol and is status post cholecystectomy.  On workup patient found to have pancreatitis due to the upper abdominal pain and epigastric tenderness as well as an elevated lipase at 316.  No significant edema or other signs of pancreatitis seen on CT.  Patient is a poorly controlled diabetic and is hyperglycemic at this time.  He was given a dose of insulin while in the ED and after diagnosing pancreatitis was made NPO.  Also suspect the patient is centrally

## 2025-05-30 NOTE — CONSULTS
Called and left message for pt to give office a call to change lab appt that was scheduled on 5/31 as we are out of the office this day.    level of lipase           ASSESSMENT AND PLAN   Pancreatitis can be medication vs diabetes induced. Gastroparesis can be induced by Trulicity.   Reglan 10mg bid. Can start Gimoti as outpt.  Miralax 17gm daily.  Can still do GES as outpt.   Once pain has improved, ok to start low triglyceride diet.   Follow up in our office in 2-3 weeks with LFTS and lipase prior.     Assessment and plan of care discussed with supervising physician, Dr Hernandez.      Thank you Sadi Steven MD for allowing me to participate in this patient's care.    MARILYN Booth - CNP, MD,FACP 5/30/2025 8:18 AM

## 2025-05-30 NOTE — H&P
auscultation, bilaterally without rales or wheezes or rhonchi.  Cardiovascular: Normal rate, regular rhythm with normal S1/S2 without murmurs.  No lower extremity edema.   Abdomen: firm non-tender  Musculoskeletal: No joint swelling or tenderness. Normal tone. No abnormal movements.   Skin: Warm and dry. No rashes or lesions.  Neurologic:  No focal sensory/motor deficits in the upper and lower extremities..     Psychiatric: Alert and oriented, normal insight and thought content.   Capillary Refill: Brisk,< 3 seconds.  Peripheral Pulses: +2 palpable, equal bilaterally.       Medications Prior to Admission:   Prior to Admission Medications   Prescriptions Last Dose Informant Patient Reported? Taking?   ARIPiprazole (ABILIFY) 10 MG tablet   Yes No   Sig: Take 1 tablet by mouth daily   Blood Glucose Monitoring Suppl (FREESTYLE LITE) LIANNE   No No   Si Device by Does not apply route 1 time for 1 dose Freestyle glucose meter or meter covered by patient's insurance plan   Cholecalciferol (VITAMIN D) 25 MCG TABS   No No   Sig: Take 1 tablet by mouth daily   Continuous Glucose  (DEXCOM G7 ) LIANNE   No No   Sig: USE TO CHECK BLOOD GLUCOSE 4 TIMES A DAY   Continuous Glucose Sensor (DEXCOM G7 SENSOR) MISC   No No   Sig: Check blood sugars 4x/day   DULoxetine (CYMBALTA) 30 MG extended release capsule   No No   Sig: Take 1 capsule by mouth daily   Dulaglutide (TRULICITY) 4.5 MG/0.5ML SOAJ   No No   Si.5 mg weekly sc   Insulin Glargine, 2 Unit Dial, (TOUJEO MAX SOLOSTAR) 300 UNIT/ML concentrated injection pen   No No   Sig: INJECT 30 UNITS IN THE MORNING, 30 UNITS IN THE EVENING. EVERY 10 DAYS INCREASE BY 5 UNITS BOTH TIMES, TO GET MORNING GLUCOSE 150-200.   Insulin Pen Needle (TRUEPLUS PEN NEEDLES) 31G X 8 MM MISC   No No   Sig: USE AS DIRECTED   Lancets MISC   No No   Si each by Does not apply route 2 times daily Please fill lancets that are compatible with glucose meter/lancing device that is covered

## 2025-05-30 NOTE — PROGRESS NOTES
Hospitalist Progress Note      Patient:  Jamie Spangler    Unit/Bed:7K-20/020-A  YOB: 1970  MRN: [id number]   Acct: [id number]   PCP: Phuc Ortega MD  Date of Admission: 5/29/2025      ASSESSMENT AND PLAN    Pancreatitis: Abdominal pain w/ elevated lipase: Likely acute on chronic or Trulicity/DM-induced pancreatitis (lipase 316; prior 186); CT shows atrophic pancreas. Ca, EtOH normal; TG pending. IVF started, GI consulted, consider MRCP. Start low TG diet once pain improves.  Gastroparesis (Trulicity-induced): CT shows fluid-filled stomach; DM is a risk factor.GI consulted. Reglan 10?mg BID, start Gimoti outpatient. Can do GES outpatient. Would consider NG tube if symptoms persist.  Constipation: Miralax 17?g daily; continue bowel regimen; KUB if no BM.  HAGMA: Likely DM-related; continue IVF.  Leukocytosis: Likely reactive; monitor.  Distended bladder on CT: Bladder scan; straight cath if >300?cc.  Follow-up: 2-3 weeks with LFTs and lipase prior.    Chronic Conditions (reviewed and stable unless otherwise stated)   HFpEF, not acutely decompensated: TTE 03/16/2025: EF of 55-60% cont home meds (lisinorpril, imdur, lasix,Lopressor) hold Jardiance  IDDM2: 4/17/2025 A1c 7.1 Home meds, lantus 30, lispro 35 units TID, Jardiance 25mg, and Trulicity weekly. Continue Lantus 30 units daily, Medium dose SSI. POC glucose ACHS. Hold Jardiance  Neuropathy: Continue home gabapentin  Severe bipolar II disorder, recent MDD episode-in remission: Continue home Abilify 5 mg qd & Cymbalta   HTN: cont Home regimen  GERD w/out esophagitis: On Prilosec 40 mg at home: Change to Protonix 40 mg qam ac due to formulary.  HLD: Continue home Lipitor 80 mg qhs  Obesity: Increase complexity of case and risk of complication        DVT Prophylaxis: [x] Lovenox / [] Heparin / [] SCDs / [] Already on Systemic Anticoagulation / [] None     Expected discharge date:

## 2025-05-30 NOTE — CARE COORDINATION
Case Management Assessment Initial Evaluation    Date/Time of Evaluation: 2025 7:53 AM  Assessment Completed by: Jenni Coulter    If patient is discharged prior to next notation, then this note serves as note for discharge by case management.    Patient Name: Jamie Spangler                   YOB: 1970  Diagnosis: Abnormal serum level of lipase [R74.8]  Acute pancreatitis without infection or necrosis, unspecified pancreatitis type [K85.90]                   Date / Time: 2025  7:22 PM  [facility]     Patient Admission Status: Inpatient   Readmission Risk Low 0-14, Mod 15-19), High > 20: Readmission Risk Score: 21.1    Current PCP: Phuc Ortega MD  Health Care Decision Makers:   Primary Decision Maker: GiorgiFrantz - Aunt/Uncle - [phone]    Additional Case Management Notes: Patient presents to ED with Abdominal pain and constipation. Hospitalist and GI following. Recent EGD with Dr. Hernandez 25. Lipase:  316. DM Management. IVF    Procedures: none    Imagin/29 CT ABD/PEL: 1. No acute intra-abdominal process.     Patient Goals/Plan/Treatment Preferences: Patient from home , lives with friends. Denies needs. Has needed DME.        25 1404   Service Assessment   Patient Orientation Alert and Oriented   Cognition Alert   History Provided By Patient   Primary Caregiver Self   Accompanied By/Relationship n/a   Support Systems Family Members;Friends/Neighbors   Patient's Healthcare Decision Maker is: Named in Scanned ACP Document   PCP Verified by CM Yes   Last Visit to PCP Within last 3 months   Prior Functional Level Independent in ADLs/IADLs;Bathing;Dressing;Toileting;Cooking;Feeding;Housework;Shopping;Mobility   Current Functional Level Independent in ADLs/IADLs;Bathing;Dressing;Toileting;Feeding;Housework;Cooking;Shopping;Mobility   Can patient return to prior living arrangement Yes   Ability to make needs known: Good   Family able to assist with home care

## 2025-05-31 LAB
ANION GAP SERPL CALC-SCNC: 9 MEQ/L (ref 8–16)
BASOPHILS ABSOLUTE: 0.1 THOU/MM3 (ref 0–0.1)
BASOPHILS NFR BLD AUTO: 0.6 %
BUN SERPL-MCNC: 10 MG/DL (ref 8–23)
CA-I BLD ISE-SCNC: 1.17 MMOL/L (ref 1.12–1.32)
CALCIUM SERPL-MCNC: 8.3 MG/DL (ref 8.6–10)
CHLORIDE SERPL-SCNC: 103 MEQ/L (ref 98–111)
CO2 SERPL-SCNC: 26 MEQ/L (ref 22–29)
CREAT SERPL-MCNC: 0.8 MG/DL (ref 0.7–1.2)
DEPRECATED RDW RBC AUTO: 47 FL (ref 35–45)
EOSINOPHIL NFR BLD AUTO: 4.6 %
EOSINOPHILS ABSOLUTE: 0.5 THOU/MM3 (ref 0–0.4)
ERYTHROCYTE [DISTWIDTH] IN BLOOD BY AUTOMATED COUNT: 14.1 % (ref 11.5–14.5)
GFR SERPL CREATININE-BSD FRML MDRD: > 90 ML/MIN/1.73M2
GLUCOSE BLD STRIP.AUTO-MCNC: 136 MG/DL (ref 70–108)
GLUCOSE BLD STRIP.AUTO-MCNC: 144 MG/DL (ref 70–108)
GLUCOSE BLD STRIP.AUTO-MCNC: 181 MG/DL (ref 70–108)
GLUCOSE BLD STRIP.AUTO-MCNC: 283 MG/DL (ref 70–108)
GLUCOSE SERPL-MCNC: 133 MG/DL (ref 74–109)
HCT VFR BLD AUTO: 40.4 % (ref 42–52)
HGB BLD-MCNC: 13.9 GM/DL (ref 14–18)
IMM GRANULOCYTES # BLD AUTO: 0.08 THOU/MM3 (ref 0–0.07)
IMM GRANULOCYTES NFR BLD AUTO: 0.8 %
LYMPHOCYTES ABSOLUTE: 2.3 THOU/MM3 (ref 1–4.8)
LYMPHOCYTES NFR BLD AUTO: 22 %
MAGNESIUM SERPL-MCNC: 2 MG/DL (ref 1.6–2.6)
MCH RBC QN AUTO: 31.2 PG (ref 26–33)
MCHC RBC AUTO-ENTMCNC: 34.4 GM/DL (ref 32.2–35.5)
MCV RBC AUTO: 90.8 FL (ref 80–94)
MONOCYTES ABSOLUTE: 0.9 THOU/MM3 (ref 0.4–1.3)
MONOCYTES NFR BLD AUTO: 8.4 %
NEUTROPHILS ABSOLUTE: 6.7 THOU/MM3 (ref 1.8–7.7)
NEUTROPHILS NFR BLD AUTO: 63.6 %
NRBC BLD AUTO-RTO: 0 /100 WBC
PHOSPHATE SERPL-MCNC: 2.4 MG/DL (ref 2.5–4.5)
PLATELET # BLD AUTO: 171 THOU/MM3 (ref 130–400)
PMV BLD AUTO: 9.5 FL (ref 9.4–12.4)
POTASSIUM SERPL-SCNC: 4.1 MEQ/L (ref 3.5–5.2)
RBC # BLD AUTO: 4.45 MILL/MM3 (ref 4.7–6.1)
SODIUM SERPL-SCNC: 138 MEQ/L (ref 135–145)
TRIGL SERPL-MCNC: 208 MG/DL (ref 0–199)
WBC # BLD AUTO: 10.6 THOU/MM3 (ref 4.8–10.8)

## 2025-05-31 PROCEDURE — 1200000003 HC TELEMETRY R&B

## 2025-05-31 PROCEDURE — 6370000000 HC RX 637 (ALT 250 FOR IP)

## 2025-05-31 PROCEDURE — 84100 ASSAY OF PHOSPHORUS: CPT

## 2025-05-31 PROCEDURE — 80048 BASIC METABOLIC PNL TOTAL CA: CPT

## 2025-05-31 PROCEDURE — 84478 ASSAY OF TRIGLYCERIDES: CPT

## 2025-05-31 PROCEDURE — 85025 COMPLETE CBC W/AUTO DIFF WBC: CPT

## 2025-05-31 PROCEDURE — 99232 SBSQ HOSP IP/OBS MODERATE 35: CPT

## 2025-05-31 PROCEDURE — 82330 ASSAY OF CALCIUM: CPT

## 2025-05-31 PROCEDURE — 36415 COLL VENOUS BLD VENIPUNCTURE: CPT

## 2025-05-31 PROCEDURE — 6360000002 HC RX W HCPCS

## 2025-05-31 PROCEDURE — 83735 ASSAY OF MAGNESIUM: CPT

## 2025-05-31 PROCEDURE — 2580000003 HC RX 258

## 2025-05-31 PROCEDURE — 82948 REAGENT STRIP/BLOOD GLUCOSE: CPT

## 2025-05-31 RX ORDER — POLYETHYLENE GLYCOL 3350 17 G/17G
17 POWDER, FOR SOLUTION ORAL DAILY
Status: DISCONTINUED | OUTPATIENT
Start: 2025-05-31 | End: 2025-06-02 | Stop reason: HOSPADM

## 2025-05-31 RX ADMIN — GABAPENTIN 400 MG: 400 CAPSULE ORAL at 15:02

## 2025-05-31 RX ADMIN — INSULIN GLARGINE 24 UNITS: 100 INJECTION, SOLUTION SUBCUTANEOUS at 08:53

## 2025-05-31 RX ADMIN — PANTOPRAZOLE SODIUM 40 MG: 40 TABLET, DELAYED RELEASE ORAL at 15:02

## 2025-05-31 RX ADMIN — ISOSORBIDE MONONITRATE 60 MG: 60 TABLET, EXTENDED RELEASE ORAL at 08:53

## 2025-05-31 RX ADMIN — METOPROLOL TARTRATE 25 MG: 25 TABLET, FILM COATED ORAL at 08:53

## 2025-05-31 RX ADMIN — INSULIN LISPRO 1 UNITS: 100 INJECTION, SOLUTION INTRAVENOUS; SUBCUTANEOUS at 20:52

## 2025-05-31 RX ADMIN — INSULIN LISPRO 2 UNITS: 100 INJECTION, SOLUTION INTRAVENOUS; SUBCUTANEOUS at 16:52

## 2025-05-31 RX ADMIN — INSULIN GLARGINE 24 UNITS: 100 INJECTION, SOLUTION SUBCUTANEOUS at 20:52

## 2025-05-31 RX ADMIN — DULOXETINE 30 MG: 30 CAPSULE, DELAYED RELEASE ORAL at 08:52

## 2025-05-31 RX ADMIN — METOCLOPRAMIDE 10 MG: 10 TABLET ORAL at 20:52

## 2025-05-31 RX ADMIN — GABAPENTIN 400 MG: 400 CAPSULE ORAL at 08:52

## 2025-05-31 RX ADMIN — ASPIRIN 81 MG: 81 TABLET, COATED ORAL at 08:53

## 2025-05-31 RX ADMIN — FUROSEMIDE 20 MG: 40 TABLET ORAL at 08:53

## 2025-05-31 RX ADMIN — RANOLAZINE 500 MG: 500 TABLET, EXTENDED RELEASE ORAL at 08:52

## 2025-05-31 RX ADMIN — LISINOPRIL 5 MG: 5 TABLET ORAL at 08:53

## 2025-05-31 RX ADMIN — ATORVASTATIN CALCIUM 80 MG: 80 TABLET, FILM COATED ORAL at 20:52

## 2025-05-31 RX ADMIN — OXYCODONE HYDROCHLORIDE AND ACETAMINOPHEN 500 MG: 500 TABLET ORAL at 08:53

## 2025-05-31 RX ADMIN — METOPROLOL TARTRATE 25 MG: 25 TABLET, FILM COATED ORAL at 20:52

## 2025-05-31 RX ADMIN — PANTOPRAZOLE SODIUM 40 MG: 40 TABLET, DELAYED RELEASE ORAL at 06:21

## 2025-05-31 RX ADMIN — SODIUM CHLORIDE: 0.9 INJECTION, SOLUTION INTRAVENOUS at 06:19

## 2025-05-31 RX ADMIN — RANOLAZINE 500 MG: 500 TABLET, EXTENDED RELEASE ORAL at 20:52

## 2025-05-31 RX ADMIN — GABAPENTIN 400 MG: 400 CAPSULE ORAL at 20:52

## 2025-05-31 RX ADMIN — METOCLOPRAMIDE 10 MG: 10 TABLET ORAL at 08:52

## 2025-05-31 RX ADMIN — SODIUM CHLORIDE: 0.9 INJECTION, SOLUTION INTRAVENOUS at 16:57

## 2025-05-31 RX ADMIN — ENOXAPARIN SODIUM 40 MG: 100 INJECTION SUBCUTANEOUS at 08:53

## 2025-05-31 ASSESSMENT — PAIN SCALES - GENERAL
PAINLEVEL_OUTOF10: 0
PAINLEVEL_OUTOF10: 0

## 2025-05-31 NOTE — PROGRESS NOTES
Hospitalist Progress Note      Patient:  Jamie Spangler 55 y.o. male       : 1970  Unit/Bed:7-/020-A    Date of Admission: 2025      ASSESSMENT AND PLAN    Active Problems  Pancreatitis: Abdominal pain with elevated lipase, likely acute on chronic.  Likely Trulicity or DM induced pancreatitis.  CT shows atrophic pancreas.  EtOH normal,   GI consulted- Okay to advance diet as tolerated.  Follow-up in office in 2 to 3 weeks with LFTs and lipase prior  Continue IV fluids  Diet advance to full liquids with low TG  Gastroparesis: Trulicity induced.  CT shows fluid-filled stomach.  GI following-Reglan 10 mg twice daily, can start Imodium outpatient  GES outpatient  Constipation: Continue MiraLAX daily, senna daily and Colace as needed  Distended bladder on CT: Patient voiding spontaneously      Resolved Problems  Leukocytosis  HAGMA    Chronic Conditions (reviewed, stable, and home medications resumed, unless otherwise stated)  HFpEF, not acutely decompensated: TTE 2025: EF of 55-60% cont home meds (lisinorpril, imdur, lasix,Lopressor) hold Jardiance  IDDM2: 2025 A1c 7.1 Home meds, lantus 30, lispro 35 units TID, Jardiance 25mg, and Trulicity weekly. Continue Lantus 30 units daily, Medium dose SSI. POC glucose ACHS. Hold Jardiance  Neuropathy: Continue home gabapentin  Severe bipolar II disorder, recent MDD episode-in remission: Continue home Abilify 5 mg qd & Cymbalta   HTN: cont Home regimen  GERD w/out esophagitis: On Prilosec 40 mg at home: Change to Protonix 40 mg qam ac due to formulary.  HLD: Continue home Lipitor 80 mg qhs  Obesity: Increase complexity of case and risk of complication      LDA: []CVC / []PICC / []Midline / []Mitchell / []Drains / []Mediport / [x]None  Antibiotics: None  Steroids: None  Labs (still needed?): [x]Yes / []No  IVF (still needed?): [x]Yes / []No    Level of care: []Step Down / [x]Med-Surg  Bed Status: [x]Inpatient / []Observation  Telemetry: [x]Yes /

## 2025-06-01 ENCOUNTER — APPOINTMENT (OUTPATIENT)
Dept: GENERAL RADIOLOGY | Age: 55
DRG: 439 | End: 2025-06-01
Payer: MEDICARE

## 2025-06-01 LAB
ANION GAP SERPL CALC-SCNC: 9 MEQ/L (ref 8–16)
BUN SERPL-MCNC: 8 MG/DL (ref 8–23)
CALCIUM SERPL-MCNC: 7.8 MG/DL (ref 8.6–10)
CHLORIDE SERPL-SCNC: 108 MEQ/L (ref 98–111)
CO2 SERPL-SCNC: 24 MEQ/L (ref 22–29)
CREAT SERPL-MCNC: 0.9 MG/DL (ref 0.7–1.2)
DEPRECATED RDW RBC AUTO: 48.3 FL (ref 35–45)
ERYTHROCYTE [DISTWIDTH] IN BLOOD BY AUTOMATED COUNT: 14.4 % (ref 11.5–14.5)
GFR SERPL CREATININE-BSD FRML MDRD: > 90 ML/MIN/1.73M2
GLUCOSE BLD STRIP.AUTO-MCNC: 115 MG/DL (ref 70–108)
GLUCOSE BLD STRIP.AUTO-MCNC: 154 MG/DL (ref 70–108)
GLUCOSE BLD STRIP.AUTO-MCNC: 155 MG/DL (ref 70–108)
GLUCOSE BLD STRIP.AUTO-MCNC: 196 MG/DL (ref 70–108)
GLUCOSE SERPL-MCNC: 88 MG/DL (ref 74–109)
HCT VFR BLD AUTO: 38.7 % (ref 42–52)
HGB BLD-MCNC: 13.5 GM/DL (ref 14–18)
MCH RBC QN AUTO: 32.1 PG (ref 26–33)
MCHC RBC AUTO-ENTMCNC: 34.9 GM/DL (ref 32.2–35.5)
MCV RBC AUTO: 91.9 FL (ref 80–94)
PLATELET # BLD AUTO: 160 THOU/MM3 (ref 130–400)
PMV BLD AUTO: 9.8 FL (ref 9.4–12.4)
POTASSIUM SERPL-SCNC: 4 MEQ/L (ref 3.5–5.2)
RBC # BLD AUTO: 4.21 MILL/MM3 (ref 4.7–6.1)
SODIUM SERPL-SCNC: 141 MEQ/L (ref 135–145)
WBC # BLD AUTO: 10 THOU/MM3 (ref 4.8–10.8)

## 2025-06-01 PROCEDURE — 71045 X-RAY EXAM CHEST 1 VIEW: CPT

## 2025-06-01 PROCEDURE — 99232 SBSQ HOSP IP/OBS MODERATE 35: CPT

## 2025-06-01 PROCEDURE — 80048 BASIC METABOLIC PNL TOTAL CA: CPT

## 2025-06-01 PROCEDURE — 2580000003 HC RX 258: Performed by: NURSE PRACTITIONER

## 2025-06-01 PROCEDURE — 2500000003 HC RX 250 WO HCPCS

## 2025-06-01 PROCEDURE — 6370000000 HC RX 637 (ALT 250 FOR IP): Performed by: NURSE PRACTITIONER

## 2025-06-01 PROCEDURE — 2580000003 HC RX 258: Performed by: PHYSICIAN ASSISTANT

## 2025-06-01 PROCEDURE — 85027 COMPLETE CBC AUTOMATED: CPT

## 2025-06-01 PROCEDURE — 82948 REAGENT STRIP/BLOOD GLUCOSE: CPT

## 2025-06-01 PROCEDURE — 1200000003 HC TELEMETRY R&B

## 2025-06-01 PROCEDURE — 6370000000 HC RX 637 (ALT 250 FOR IP)

## 2025-06-01 PROCEDURE — 2580000003 HC RX 258

## 2025-06-01 PROCEDURE — 6360000002 HC RX W HCPCS

## 2025-06-01 PROCEDURE — 36415 COLL VENOUS BLD VENIPUNCTURE: CPT

## 2025-06-01 RX ORDER — SODIUM CHLORIDE, SODIUM LACTATE, POTASSIUM CHLORIDE, AND CALCIUM CHLORIDE .6; .31; .03; .02 G/100ML; G/100ML; G/100ML; G/100ML
1000 INJECTION, SOLUTION INTRAVENOUS ONCE
Status: COMPLETED | OUTPATIENT
Start: 2025-06-01 | End: 2025-06-01

## 2025-06-01 RX ORDER — 0.9 % SODIUM CHLORIDE 0.9 %
500 INTRAVENOUS SOLUTION INTRAVENOUS ONCE
Status: COMPLETED | OUTPATIENT
Start: 2025-06-01 | End: 2025-06-01

## 2025-06-01 RX ORDER — 0.9 % SODIUM CHLORIDE 0.9 %
500 INTRAVENOUS SOLUTION INTRAVENOUS ONCE
Status: DISCONTINUED | OUTPATIENT
Start: 2025-06-01 | End: 2025-06-02 | Stop reason: HOSPADM

## 2025-06-01 RX ADMIN — METOCLOPRAMIDE 10 MG: 10 TABLET ORAL at 08:22

## 2025-06-01 RX ADMIN — ASPIRIN 81 MG: 81 TABLET, COATED ORAL at 08:22

## 2025-06-01 RX ADMIN — INSULIN GLARGINE 24 UNITS: 100 INJECTION, SOLUTION SUBCUTANEOUS at 08:22

## 2025-06-01 RX ADMIN — METOPROLOL TARTRATE 25 MG: 25 TABLET, FILM COATED ORAL at 10:00

## 2025-06-01 RX ADMIN — INSULIN GLARGINE 24 UNITS: 100 INJECTION, SOLUTION SUBCUTANEOUS at 20:55

## 2025-06-01 RX ADMIN — ATORVASTATIN CALCIUM 80 MG: 80 TABLET, FILM COATED ORAL at 20:55

## 2025-06-01 RX ADMIN — SODIUM CHLORIDE, PRESERVATIVE FREE 5 ML: 5 INJECTION INTRAVENOUS at 20:59

## 2025-06-01 RX ADMIN — ENOXAPARIN SODIUM 40 MG: 100 INJECTION SUBCUTANEOUS at 08:22

## 2025-06-01 RX ADMIN — GABAPENTIN 400 MG: 400 CAPSULE ORAL at 13:49

## 2025-06-01 RX ADMIN — DULOXETINE 30 MG: 30 CAPSULE, DELAYED RELEASE ORAL at 08:22

## 2025-06-01 RX ADMIN — PANTOPRAZOLE SODIUM 40 MG: 40 TABLET, DELAYED RELEASE ORAL at 06:17

## 2025-06-01 RX ADMIN — METOPROLOL TARTRATE 25 MG: 25 TABLET, FILM COATED ORAL at 20:55

## 2025-06-01 RX ADMIN — GABAPENTIN 400 MG: 400 CAPSULE ORAL at 20:55

## 2025-06-01 RX ADMIN — RANOLAZINE 500 MG: 500 TABLET, EXTENDED RELEASE ORAL at 08:22

## 2025-06-01 RX ADMIN — RANOLAZINE 500 MG: 500 TABLET, EXTENDED RELEASE ORAL at 20:55

## 2025-06-01 RX ADMIN — LISINOPRIL 5 MG: 5 TABLET ORAL at 10:00

## 2025-06-01 RX ADMIN — Medication 500 ML: at 02:39

## 2025-06-01 RX ADMIN — SODIUM CHLORIDE, SODIUM LACTATE, POTASSIUM CHLORIDE, AND CALCIUM CHLORIDE 1000 ML: .6; .31; .03; .02 INJECTION, SOLUTION INTRAVENOUS at 05:17

## 2025-06-01 RX ADMIN — INSULIN LISPRO 1 UNITS: 100 INJECTION, SOLUTION INTRAVENOUS; SUBCUTANEOUS at 20:55

## 2025-06-01 RX ADMIN — PANTOPRAZOLE SODIUM 40 MG: 40 TABLET, DELAYED RELEASE ORAL at 16:47

## 2025-06-01 RX ADMIN — SODIUM CHLORIDE 500 ML: 0.9 INJECTION, SOLUTION INTRAVENOUS at 00:11

## 2025-06-01 RX ADMIN — OXYCODONE HYDROCHLORIDE AND ACETAMINOPHEN 500 MG: 500 TABLET ORAL at 08:22

## 2025-06-01 RX ADMIN — GABAPENTIN 400 MG: 400 CAPSULE ORAL at 08:22

## 2025-06-01 RX ADMIN — SODIUM CHLORIDE: 0.9 INJECTION, SOLUTION INTRAVENOUS at 03:33

## 2025-06-01 RX ADMIN — ISOSORBIDE MONONITRATE 60 MG: 60 TABLET, EXTENDED RELEASE ORAL at 08:22

## 2025-06-01 RX ADMIN — METOCLOPRAMIDE 10 MG: 10 TABLET ORAL at 20:55

## 2025-06-01 ASSESSMENT — PAIN SCALES - GENERAL
PAINLEVEL_OUTOF10: 0

## 2025-06-01 NOTE — PROGRESS NOTES
Hospitalist Progress Note      Patient:  Jamie Spangler 55 y.o. male       : 1970  Unit/Bed:7K-20/020-A    Date of Admission: 2025      ASSESSMENT AND PLAN    Active Problems  Pancreatitis- improved: Abdominal pain with elevated lipase, likely acute on chronic.  Likely Trulicity or DM induced pancreatitis.  CT shows atrophic pancreas.  EtOH normal,   GI consulted- Okay to advance diet as tolerated.  Follow-up in office in 2 to 3 weeks with LFTs and lipase prior  IV fluids discontinued  Diet advance to regular diet with low TG  Likely discharge tomorrow if tolerating diet   Gastroparesis: Trulicity induced.  CT shows fluid-filled stomach.  GI following-Reglan 10 mg twice daily, can start Imodium outpatient  GES outpatient  Distended bladder on CT: Patient voiding spontaneously    Resolved Problems  Leukocytosis  HAGMA  Constipation: had BM , . Continue bowel regimen    Chronic Conditions (reviewed, stable, and home medications resumed, unless otherwise stated)  HFpEF, not acutely decompensated: TTE 2025: EF of 55-60% cont home meds (lisinorpril, imdur, lasix,Lopressor) hold Jardiance  IDDM2: 2025 A1c 7.1 Home meds, lantus 30, lispro 35 units TID, Jardiance 25mg, and Trulicity weekly. Continue Lantus 30 units daily, Medium dose SSI. POC glucose ACHS. Hold Jardiance  Neuropathy: Continue home gabapentin  Severe bipolar II disorder, recent MDD episode-in remission: Continue home Abilify 5 mg qd & Cymbalta   HTN: cont Home regimen  GERD w/out esophagitis: On Prilosec 40 mg at home: Change to Protonix 40 mg qam ac due to formulary.  HLD: Continue home Lipitor 80 mg qhs  Obesity: Increase complexity of case and risk of complication      LDA: []CVC / []PICC / []Midline / []Mitchell / []Drains / []Mediport / [x]None  Antibiotics: None  Steroids: None  Labs (still needed?): [x]Yes / []No  IVF (still needed?): [x]Yes / []No    Level of care: []Step Down / [x]Med-Surg  Bed Status:

## 2025-06-02 VITALS
OXYGEN SATURATION: 100 % | HEIGHT: 66 IN | DIASTOLIC BLOOD PRESSURE: 69 MMHG | RESPIRATION RATE: 19 BRPM | TEMPERATURE: 97.9 F | HEART RATE: 70 BPM | SYSTOLIC BLOOD PRESSURE: 137 MMHG | WEIGHT: 219 LBS | BODY MASS INDEX: 35.2 KG/M2

## 2025-06-02 LAB
GLUCOSE BLD STRIP.AUTO-MCNC: 101 MG/DL (ref 70–108)
GLUCOSE BLD STRIP.AUTO-MCNC: 79 MG/DL (ref 70–108)

## 2025-06-02 PROCEDURE — 6370000000 HC RX 637 (ALT 250 FOR IP)

## 2025-06-02 PROCEDURE — 82948 REAGENT STRIP/BLOOD GLUCOSE: CPT

## 2025-06-02 PROCEDURE — 2500000003 HC RX 250 WO HCPCS

## 2025-06-02 PROCEDURE — 6360000002 HC RX W HCPCS

## 2025-06-02 PROCEDURE — 6370000000 HC RX 637 (ALT 250 FOR IP): Performed by: NURSE PRACTITIONER

## 2025-06-02 PROCEDURE — 99239 HOSP IP/OBS DSCHRG MGMT >30: CPT

## 2025-06-02 RX ORDER — INSULIN GLARGINE 100 [IU]/ML
20 INJECTION, SOLUTION SUBCUTANEOUS 2 TIMES DAILY
Status: DISCONTINUED | OUTPATIENT
Start: 2025-06-02 | End: 2025-06-02 | Stop reason: HOSPADM

## 2025-06-02 RX ORDER — BISACODYL 5 MG/1
5 TABLET, DELAYED RELEASE ORAL DAILY PRN
Qty: 30 TABLET | Refills: 0 | Status: SHIPPED | OUTPATIENT
Start: 2025-06-02

## 2025-06-02 RX ORDER — METOCLOPRAMIDE 10 MG/1
10 TABLET ORAL 2 TIMES DAILY
Qty: 60 TABLET | Refills: 0 | Status: SHIPPED | OUTPATIENT
Start: 2025-06-02

## 2025-06-02 RX ORDER — SENNOSIDES 8.6 MG/1
1 TABLET ORAL NIGHTLY
Qty: 30 TABLET | Refills: 0 | Status: SHIPPED | OUTPATIENT
Start: 2025-06-02 | End: 2025-07-02

## 2025-06-02 RX ADMIN — ENOXAPARIN SODIUM 40 MG: 100 INJECTION SUBCUTANEOUS at 09:33

## 2025-06-02 RX ADMIN — DULOXETINE 30 MG: 30 CAPSULE, DELAYED RELEASE ORAL at 09:33

## 2025-06-02 RX ADMIN — SODIUM CHLORIDE, PRESERVATIVE FREE 10 ML: 5 INJECTION INTRAVENOUS at 09:32

## 2025-06-02 RX ADMIN — METOCLOPRAMIDE 10 MG: 10 TABLET ORAL at 09:33

## 2025-06-02 RX ADMIN — GABAPENTIN 400 MG: 400 CAPSULE ORAL at 09:33

## 2025-06-02 RX ADMIN — METOPROLOL TARTRATE 25 MG: 25 TABLET, FILM COATED ORAL at 09:33

## 2025-06-02 RX ADMIN — ISOSORBIDE MONONITRATE 60 MG: 60 TABLET, EXTENDED RELEASE ORAL at 09:33

## 2025-06-02 RX ADMIN — INSULIN GLARGINE 20 UNITS: 100 INJECTION, SOLUTION SUBCUTANEOUS at 09:34

## 2025-06-02 RX ADMIN — ASPIRIN 81 MG: 81 TABLET, COATED ORAL at 09:33

## 2025-06-02 RX ADMIN — OXYCODONE HYDROCHLORIDE AND ACETAMINOPHEN 500 MG: 500 TABLET ORAL at 09:33

## 2025-06-02 RX ADMIN — PANTOPRAZOLE SODIUM 40 MG: 40 TABLET, DELAYED RELEASE ORAL at 06:28

## 2025-06-02 RX ADMIN — LISINOPRIL 5 MG: 5 TABLET ORAL at 09:33

## 2025-06-02 NOTE — FLOWSHEET NOTE
1200-outpatient pharmacy tubed up pt medications, RN gave to pt, had him sign pharmacy slip and returned slip to outpatient pharmacy.  No questions from pt.    1515- RN reviewed discharge instructions with pt.  Tech removed IV, no complications noted per RN. Discussed with hospitalist that pt wanted to leave on his motorized wheelchair. Pt states he lives just down the road.  Alexa was okay with pt transporting this way.

## 2025-06-02 NOTE — PROGRESS NOTES
Spiritual Health History and Assessment/Progress Note  [facility]    (P) Initial Encounter,  ,  ,      Name: Jamie Spangler MRN: [id number]    Age: 55 y.o.     Sex: male   Language: English   Nondenominational: Lutheran   Abnormal serum level of lipase     Date: 6/2/2025            Total Time Calculated: (P) 4 min              Spiritual Assessment began in [facility] 7K        Referral/Consult From: (P) Rounding   Encounter Overview/Reason: (P) Initial Encounter  Service Provided For: (P) Patient    Sonia, Belief, Meaning:   Patient Other: None  Family/Friends No family/friends present      Importance and Influence:  Patient Other: None  Family/Friends No family/friends present    Community:  Patient Other: None  Family/Friends No family/friends present    Assessment and Plan of Care:     Patient Interventions include: Facilitated expression of thoughts and feelings  Family/Friends Interventions include: No family/friends present    Patient Plan of Care: Spiritual Care available upon further referral  Family/Friends Plan of Care: No family/friends present    Electronically signed by ROBLES Marlow on 6/2/2025 at 4:14 PM

## 2025-06-02 NOTE — PLAN OF CARE
Problem: Chronic Conditions and Co-morbidities  Goal: Patient's chronic conditions and co-morbidity symptoms are monitored and maintained or improved  5/31/2025 2151 by Nohelia Boo, RN  Outcome: Progressing  Flowsheets (Taken 5/31/2025 2151)  Care Plan - Patient's Chronic Conditions and Co-Morbidity Symptoms are Monitored and Maintained or Improved:   Monitor and assess patient's chronic conditions and comorbid symptoms for stability, deterioration, or improvement   Update acute care plan with appropriate goals if chronic or comorbid symptoms are exacerbated and prevent overall improvement and discharge   Collaborate with multidisciplinary team to address chronic and comorbid conditions and prevent exacerbation or deterioration  5/31/2025 1220 by Sanket Flores RN  Outcome: Progressing  Flowsheets (Taken 5/31/2025 0844)  Care Plan - Patient's Chronic Conditions and Co-Morbidity Symptoms are Monitored and Maintained or Improved: Monitor and assess patient's chronic conditions and comorbid symptoms for stability, deterioration, or improvement     Problem: Discharge Planning  Goal: Discharge to home or other facility with appropriate resources  5/31/2025 2151 by Nohelia Boo, RN  Outcome: Progressing  Flowsheets (Taken 5/31/2025 2151)  Discharge to home or other facility with appropriate resources:   Identify barriers to discharge with patient and caregiver   Arrange for needed discharge resources and transportation as appropriate   Identify discharge learning needs (meds, wound care, etc)  5/31/2025 1220 by Sanket Flores, RN  Outcome: Progressing  Flowsheets (Taken 5/31/2025 0844)  Discharge to home or other facility with appropriate resources: Identify barriers to discharge with patient and caregiver     Problem: Pain  Goal: Verbalizes/displays adequate comfort level or baseline comfort level  5/31/2025 2151 by Nohelia Boo, RN  Outcome: Progressing  Flowsheets (Taken 5/31/2025 
  Problem: Chronic Conditions and Co-morbidities  Goal: Patient's chronic conditions and co-morbidity symptoms are monitored and maintained or improved  6/1/2025 2305 by Nohelia Boo, RN  Outcome: Progressing  Flowsheets (Taken 6/1/2025 2305)  Care Plan - Patient's Chronic Conditions and Co-Morbidity Symptoms are Monitored and Maintained or Improved:   Monitor and assess patient's chronic conditions and comorbid symptoms for stability, deterioration, or improvement   Collaborate with multidisciplinary team to address chronic and comorbid conditions and prevent exacerbation or deterioration   Update acute care plan with appropriate goals if chronic or comorbid symptoms are exacerbated and prevent overall improvement and discharge     Problem: Discharge Planning  Goal: Discharge to home or other facility with appropriate resources  6/1/2025 2305 by Nohelia Boo, RN  Outcome: Progressing  Flowsheets (Taken 6/1/2025 2305)  Discharge to home or other facility with appropriate resources:   Identify barriers to discharge with patient and caregiver   Arrange for needed discharge resources and transportation as appropriate   Identify discharge learning needs (meds, wound care, etc)     Problem: Pain  Goal: Verbalizes/displays adequate comfort level or baseline comfort level  6/1/2025 2305 by Nohelia Boo, RN  Outcome: Progressing  Flowsheets (Taken 6/1/2025 2305)  Verbalizes/displays adequate comfort level or baseline comfort level:   Encourage patient to monitor pain and request assistance   Assess pain using appropriate pain scale   Administer analgesics based on type and severity of pain and evaluate response   Implement non-pharmacological measures as appropriate and evaluate response     Problem: Safety - Adult  Goal: Free from fall injury  6/1/2025 2305 by Nohelia Boo, RN  Outcome: Progressing  Flowsheets (Taken 6/1/2025 2305)  Free From Fall Injury: Instruct family/caregiver on patient safety     Problem: 
  Problem: Chronic Conditions and Co-morbidities  Goal: Patient's chronic conditions and co-morbidity symptoms are monitored and maintained or improved  Outcome: Adequate for Discharge  Flowsheets (Taken 6/2/2025 0927)  Care Plan - Patient's Chronic Conditions and Co-Morbidity Symptoms are Monitored and Maintained or Improved:   Monitor and assess patient's chronic conditions and comorbid symptoms for stability, deterioration, or improvement   Collaborate with multidisciplinary team to address chronic and comorbid conditions and prevent exacerbation or deterioration     Problem: Discharge Planning  Goal: Discharge to home or other facility with appropriate resources  Outcome: Adequate for Discharge  Flowsheets (Taken 6/2/2025 0927)  Discharge to home or other facility with appropriate resources:   Identify barriers to discharge with patient and caregiver   Arrange for needed discharge resources and transportation as appropriate   Identify discharge learning needs (meds, wound care, etc)     Problem: Pain  Goal: Verbalizes/displays adequate comfort level or baseline comfort level  Outcome: Adequate for Discharge  Flowsheets (Taken 6/2/2025 0915)  Verbalizes/displays adequate comfort level or baseline comfort level:   Encourage patient to monitor pain and request assistance   Assess pain using appropriate pain scale     Problem: Safety - Adult  Goal: Free from fall injury  Outcome: Adequate for Discharge     Problem: Skin/Tissue Integrity  Goal: Skin integrity remains intact  Description: 1.  Monitor for areas of redness and/or skin breakdown2.  Assess vascular access sites hourly3.  Every 4-6 hours minimum:  Change oxygen saturation probe site4.  Every 4-6 hours:  If on nasal continuous positive airway pressure, respiratory therapy assess nares and determine need for appliance change or resting period  Outcome: Adequate for Discharge  Flowsheets (Taken 6/2/2025 0927)  Skin Integrity Remains Intact:   Monitor for 
  Problem: Chronic Conditions and Co-morbidities  Goal: Patient's chronic conditions and co-morbidity symptoms are monitored and maintained or improved  Outcome: Progressing  Flowsheets (Taken 5/30/2025 0752)  Care Plan - Patient's Chronic Conditions and Co-Morbidity Symptoms are Monitored and Maintained or Improved:   Monitor and assess patient's chronic conditions and comorbid symptoms for stability, deterioration, or improvement   Collaborate with multidisciplinary team to address chronic and comorbid conditions and prevent exacerbation or deterioration   Update acute care plan with appropriate goals if chronic or comorbid symptoms are exacerbated and prevent overall improvement and discharge     Problem: Discharge Planning  Goal: Discharge to home or other facility with appropriate resources  Outcome: Progressing  Flowsheets (Taken 5/30/2025 0752)  Discharge to home or other facility with appropriate resources:   Identify barriers to discharge with patient and caregiver   Arrange for needed discharge resources and transportation as appropriate   Identify discharge learning needs (meds, wound care, etc)     Problem: Pain  Goal: Verbalizes/displays adequate comfort level or baseline comfort level  Outcome: Progressing  Flowsheets (Taken 5/30/2025 0752)  Verbalizes/displays adequate comfort level or baseline comfort level:   Encourage patient to monitor pain and request assistance   Assess pain using appropriate pain scale   Administer analgesics based on type and severity of pain and evaluate response   Implement non-pharmacological measures as appropriate and evaluate response   Consider cultural and social influences on pain and pain management     Problem: Safety - Adult  Goal: Free from fall injury  5/30/2025 1449 by Alisson Barba, RN  Outcome: Progressing  5/30/2025 0147 by Christel Evans, RN  Flowsheets (Taken 5/30/2025 0147)  Free From Fall Injury:   Instruct family/caregiver on patient safety   Based on 
  Problem: Chronic Conditions and Co-morbidities  Goal: Patient's chronic conditions and co-morbidity symptoms are monitored and maintained or improved  Outcome: Progressing  Flowsheets (Taken 6/1/2025 1426)  Care Plan - Patient's Chronic Conditions and Co-Morbidity Symptoms are Monitored and Maintained or Improved:   Monitor and assess patient's chronic conditions and comorbid symptoms for stability, deterioration, or improvement   Collaborate with multidisciplinary team to address chronic and comorbid conditions and prevent exacerbation or deterioration   Update acute care plan with appropriate goals if chronic or comorbid symptoms are exacerbated and prevent overall improvement and discharge     Problem: Discharge Planning  Goal: Discharge to home or other facility with appropriate resources  Outcome: Progressing  Flowsheets (Taken 6/1/2025 1426)  Discharge to home or other facility with appropriate resources:   Identify barriers to discharge with patient and caregiver   Identify discharge learning needs (meds, wound care, etc)   Refer to discharge planning if patient needs post-hospital services based on physician order or complex needs related to functional status, cognitive ability or social support system   Arrange for needed discharge resources and transportation as appropriate     Problem: Pain  Goal: Verbalizes/displays adequate comfort level or baseline comfort level  Outcome: Progressing  Flowsheets (Taken 6/1/2025 1426)  Verbalizes/displays adequate comfort level or baseline comfort level:   Encourage patient to monitor pain and request assistance   Administer analgesics based on type and severity of pain and evaluate response   Consider cultural and social influences on pain and pain management   Assess pain using appropriate pain scale   Implement non-pharmacological measures as appropriate and evaluate response   Notify Licensed Independent Practitioner if interventions unsuccessful or patient 
  Problem: Safety - Adult  Goal: Free from fall injury  Flowsheets (Taken 5/30/2025 0147)  Free From Fall Injury:   Instruct family/caregiver on patient safety   Based on caregiver fall risk screen, instruct family/caregiver to ask for assistance with transferring infant if caregiver noted to have fall risk factors  Note: Care plan reviewed with patient.     
maintaining nutritional needs:   Assess nutritional status and recommend course of action   Monitor oral intake, labs, and treatment plans

## 2025-06-02 NOTE — CARE COORDINATION
6/2/25, 10:40 AM EDT    Patient goals/plan/ treatment preferences discussed by  and .  Patient goals/plan/ treatment preferences reviewed with patient/ family.  Patient/ family verbalize understanding of discharge plan and are in agreement with goal/plan/treatment preferences.  Understanding was demonstrated using the teach back method.  AVS provided by RN at time of discharge, which includes all necessary medical information pertaining to the patients current course of illness, treatment, post-discharge goals of care, and treatment preferences.     Services At/After Discharge: Outpatient     Plans home today with friends; motorized wheelchair.  Sees GI in 2 weeks and needs GES as outpatient/labs.

## 2025-06-02 NOTE — DISCHARGE SUMMARY
Hospitalist Discharge Summary    Patient: Jamie Spangler  YOB: 1970  MRN: [id number]   Acct: [id number]    Primary Care Physician: Phuc Ortega MD    Admit date  5/29/2025    Discharge date:      Chief Complaint on presentation: Abdominal pain    Initial H&P and Hospital course:  Per H&P 5/29 \" Jamie Spangler is a 55 y.o. male with PMHx of  has a past medical history of Abscess of finger of right hand, Acute osteomyelitis of shoulder region ([facility]), Acute pancreatitis, Atherosclerotic heart disease of native coronary artery with unspecified angina pectoris, Bipolar 2 disorder ([facility]), BPH (benign prostatic hyperplasia), Chronic diastolic congestive heart failure ([facility]), Combined forms of age-related cataract of both eyes, COPD (chronic obstructive pulmonary disease) ([facility]), Dehiscence of laceration wound of finger, Diabetes mellitus type 2, uncontrolled, Diabetic peripheral neuropathy ([facility]), Diabetic polyneuropathy ([facility]), Diabetic ulcer of right foot associated with type 2 diabetes mellitus ([facility]), Essential hypertension, GERD (gastroesophageal reflux disease), Hammer toe of left foot, Heart murmur, History of coronary artery stent placement, History of coronary artery stent placement, History of tobacco abuse, Hx of AKA (above knee amputation), right (HCC), Hyperlipidemia, Macular edema, diabetic, bilateral (HCC), Marijuana abuse in remission, Melena, MRSA (methicillin resistant staph aureus) culture positive, Onychomycosis, Other disorders of kidney and ureter in diseases classified elsewhere, Retinal artery occlusion, Septic joint of left shoulder region ([facility]), Sleep apnea, Staphylococcal arthritis of left shoulder ([facility]), Suicidal ideation, Thyroid disease, and 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      who presents to [facility] for evaluation of abdominal pain and constipation.      Patient is stated that the abdominal pain has been going on for

## 2025-06-02 NOTE — ADT AUTH CERT
outpt.  Miralax 17gm daily.  Can still do GES as outpt.   Once pain has improved, ok to start low triglyceride diet.   Follow up in our office in 2-3 weeks with LFTS and lipase prior.         Internal med-   Pancreatitis: Abdominal pain w/ elevated lipase: Likely acute on chronic or Trulicity/DM-induced pancreatitis (lipase 316; prior 186); CT shows atrophic pancreas. Ca, EtOH normal; TG pending. IVF started, GI consulted, consider MRCP. Start low TG diet once pain improves.  Gastroparesis (Trulicity-induced): CT shows fluid-filled stomach; DM is a risk factor.GI consulted. Reglan 10?mg BID, start Gimoti outpatient. Can do GES outpatient. Would consider NG tube if symptoms persist.  Constipation: Miralax 17?g daily; continue bowel regimen; KUB if no BM.  HAGMA: Likely DM-related; continue IVF.  Leukocytosis: Likely reactive; monitor.  Distended bladder on CT: Bladder scan; straight cath if >300?cc.  Follow-up: 2-3 weeks with LFTs and lipase prior.     Chronic Conditions (reviewed and stable unless otherwise stated)   HFpEF, not acutely decompensated: TTE 03/16/2025: EF of 55-60% cont home meds (lisinorpril, imdur, lasix,Lopressor) hold Jardiance  IDDM2: 4/17/2025 A1c 7.1 Home meds, lantus 30, lispro 35 units TID, Jardiance 25mg, and Trulicity weekly. Continue Lantus 30 units daily, Medium dose SSI. POC glucose ACHS. Hold Jardiance  Neuropathy: Continue home gabapentin  Severe bipolar II disorder, recent MDD episode-in remission: Continue home Abilify 5 mg qd & Cymbalta   HTN: cont Home regimen  GERD w/out esophagitis: On Prilosec 40 mg at home: Change to Protonix 40 mg qam ac due to formulary.  HLD: Continue home Lipitor 80 mg qhs  Obesity: Increase complexity of case and risk of complication     MEDICATIONS:  Reglan 10 mg po bid  Protonix 40 mg po bid  Ns ivf at 100 ml/hr     ORDERS:  Cl liq diet  Up w asst  Glucose mgt  Telem  scd     PT/OT/SLP/CM/RN ASSESSMENT OR NOTES:  Dc plan- home        Pancreatitis Clinical

## 2025-06-03 ENCOUNTER — CARE COORDINATION (OUTPATIENT)
Dept: CARE COORDINATION | Age: 55
End: 2025-06-03

## 2025-06-03 NOTE — CARE COORDINATION
Remote Patient Monitoring Disenrollment      Date/Time:  6/3/2025 2:27 PM  Patient Current Location: [place]  Patient has been dis-enrolled from Remote Patient Monitoring program on 6/3/2025 due to disengagement. Patient has been provided instruction on process to return RPM equipment and RPM has been deactivated.     Patient has ACM's contact information for any further questions, concerns, or needs.      Ambulatory Care Coordination Note     6/3/2025 2:28 PM     patient outreach attempt by this ACM today to perform care management follow up  and perform hospital follow up. ACM was unable to reach the patient by telephone today;   hospital follow up call within 2 business days of discharge: Yes     Patient closed (unable to reach patient) from the High Risk Care Management program on 6/3/2025.  No further Ambulatory Care Manager follow up scheduled.

## 2025-06-09 RX ORDER — INSULIN LISPRO 200 [IU]/ML
INJECTION, SOLUTION SUBCUTANEOUS
Qty: 18 ML | Refills: 5 | Status: SHIPPED | OUTPATIENT
Start: 2025-06-09

## 2025-06-12 ENCOUNTER — TRANSCRIBE ORDERS (OUTPATIENT)
Dept: ADMINISTRATIVE | Age: 55
End: 2025-06-12

## 2025-06-12 DIAGNOSIS — R11.0 NAUSEA: Primary | ICD-10-CM

## 2025-06-19 ENCOUNTER — HOSPITAL ENCOUNTER (OUTPATIENT)
Dept: PHYSICAL THERAPY | Age: 55
Setting detail: THERAPIES SERIES
Discharge: HOME | End: 2025-06-19
Payer: MEDICARE

## 2025-06-19 PROCEDURE — 97162 PT EVAL MOD COMPLEX 30 MIN: CPT

## 2025-06-19 NOTE — PROGRESS NOTES
[facility]  PHYSICAL THERAPY  [x] EVALUATION  [] DAILY NOTE (LAND) [] DAILY NOTE (AQUATIC ) [] PROGRESS NOTE [] DISCHARGE NOTE    [x] OUTPATIENT REHABILITATION CENTER [facility]   [] [facility]    [] [facility]   [] [facility]    Date: 2025  Patient Name:  Jamie Spangler  : 1970  MRN: [id number]  CSN: [id number]    Referring Practitioner Alvarado Linder MD [id number]      Diagnosis  Diagnoses       S78.111A (ICD-10-CM) - Above knee amputation of right lower extremity (HCC)           Treatment Diagnosis R26.89  Abnormalities of gait and mobility  M25,651 Stiffness of right hip, not elsewhere classified  RIGHT ABOVE THE KNEE AMPUTATION   Date of Evaluation 25   Additional Pertinent History     Jamie Spangler has a past medical history of Abscess of finger of right hand, Acute osteomyelitis of shoulder region (HCC), Acute pancreatitis, Atherosclerotic heart disease of native coronary artery with unspecified angina pectoris, Bipolar 2 disorder (HCC), BPH (benign prostatic hyperplasia), Chronic diastolic congestive heart failure (HCC), Combined forms of age-related cataract of both eyes, COPD (chronic obstructive pulmonary disease) (HCC), Dehiscence of laceration wound of finger, Diabetes mellitus type 2, uncontrolled, Diabetic peripheral neuropathy (HCC), Diabetic polyneuropathy (HCC), Diabetic ulcer of right foot associated with type 2 diabetes mellitus (HCC), Essential hypertension, GERD (gastroesophageal reflux disease), Hammer toe of left foot, Heart murmur, History of coronary artery stent placement, History of coronary artery stent placement, History of tobacco abuse, Hx of AKA (above knee amputation), right (HCC), Hyperlipidemia, Macular edema, diabetic, bilateral (HCC), Marijuana abuse in remission, Melena, MRSA (methicillin resistant staph aureus) culture positive, Onychomycosis, Other disorders of kidney and ureter in diseases classified

## 2025-06-24 ENCOUNTER — HOSPITAL ENCOUNTER (OUTPATIENT)
Dept: NUCLEAR MEDICINE | Age: 55
Discharge: HOME | End: 2025-06-24
Payer: MEDICARE

## 2025-06-24 ENCOUNTER — OFFICE VISIT (OUTPATIENT)
Dept: INTERNAL MEDICINE CLINIC | Age: 55
End: 2025-06-24

## 2025-06-24 VITALS — OXYGEN SATURATION: 97 % | SYSTOLIC BLOOD PRESSURE: 132 MMHG | HEART RATE: 82 BPM | DIASTOLIC BLOOD PRESSURE: 70 MMHG

## 2025-06-24 DIAGNOSIS — K21.9 GASTROESOPHAGEAL REFLUX DISEASE WITHOUT ESOPHAGITIS: ICD-10-CM

## 2025-06-24 DIAGNOSIS — Z95.5 HISTORY OF CORONARY ARTERY STENT PLACEMENT: ICD-10-CM

## 2025-06-24 DIAGNOSIS — Z79.4 TYPE 2 DIABETES MELLITUS WITH CHRONIC KIDNEY DISEASE ON CHRONIC DIALYSIS, WITH LONG-TERM CURRENT USE OF INSULIN (HCC): Primary | ICD-10-CM

## 2025-06-24 DIAGNOSIS — E11.22 TYPE 2 DIABETES MELLITUS WITH CHRONIC KIDNEY DISEASE ON CHRONIC DIALYSIS, WITH LONG-TERM CURRENT USE OF INSULIN (HCC): Primary | ICD-10-CM

## 2025-06-24 DIAGNOSIS — Z79.4 UNCONTROLLED TYPE 2 DIABETES MELLITUS WITH HYPERGLYCEMIA, WITH LONG-TERM CURRENT USE OF INSULIN (HCC): Chronic | ICD-10-CM

## 2025-06-24 DIAGNOSIS — E66.9 OBESITY (BMI 30-39.9): ICD-10-CM

## 2025-06-24 DIAGNOSIS — R11.0 NAUSEA: ICD-10-CM

## 2025-06-24 DIAGNOSIS — E11.65 UNCONTROLLED TYPE 2 DIABETES MELLITUS WITH HYPERGLYCEMIA, WITH LONG-TERM CURRENT USE OF INSULIN (HCC): Chronic | ICD-10-CM

## 2025-06-24 DIAGNOSIS — Z89.611 HX OF AKA (ABOVE KNEE AMPUTATION), RIGHT (HCC): ICD-10-CM

## 2025-06-24 DIAGNOSIS — Z99.2 TYPE 2 DIABETES MELLITUS WITH CHRONIC KIDNEY DISEASE ON CHRONIC DIALYSIS, WITH LONG-TERM CURRENT USE OF INSULIN (HCC): Primary | ICD-10-CM

## 2025-06-24 DIAGNOSIS — N18.6 TYPE 2 DIABETES MELLITUS WITH CHRONIC KIDNEY DISEASE ON CHRONIC DIALYSIS, WITH LONG-TERM CURRENT USE OF INSULIN (HCC): Primary | ICD-10-CM

## 2025-06-24 PROCEDURE — 78264 GASTRIC EMPTYING IMG STUDY: CPT

## 2025-06-24 PROCEDURE — 3430000000 HC RX DIAGNOSTIC RADIOPHARMACEUTICAL: Performed by: NURSE PRACTITIONER

## 2025-06-24 PROCEDURE — A9541 TC99M SULFUR COLLOID: HCPCS | Performed by: NURSE PRACTITIONER

## 2025-06-24 RX ORDER — LISINOPRIL 5 MG/1
5 TABLET ORAL DAILY
Qty: 30 TABLET | Refills: 5 | Status: SHIPPED | OUTPATIENT
Start: 2025-06-24

## 2025-06-24 RX ORDER — HYDROCHLOROTHIAZIDE 12.5 MG/1
CAPSULE ORAL
Qty: 2 EACH | Refills: 5 | Status: SHIPPED | OUTPATIENT
Start: 2025-06-24

## 2025-06-24 RX ORDER — FUROSEMIDE 20 MG/1
20 TABLET ORAL DAILY
Qty: 90 TABLET | Refills: 1 | Status: SHIPPED | OUTPATIENT
Start: 2025-06-24

## 2025-06-24 RX ORDER — KETOROLAC TROMETHAMINE 30 MG/ML
INJECTION, SOLUTION INTRAMUSCULAR; INTRAVENOUS
Qty: 1 EACH | Refills: 0 | Status: SHIPPED | OUTPATIENT
Start: 2025-06-24

## 2025-06-24 RX ADMIN — Medication 1 MILLICURIE: at 07:19

## 2025-06-24 NOTE — PROGRESS NOTES
[facility]   [address]  [place] [id number]  Dept: [phone]  Dept Fax: [phone]    YOB: 1970    Post hosp follow up     55 y.o. male   here for follow-up of above issues. He does have hx of DM now followed by dr. Mitchell, and does have an appt with their office next month. I haven' seen him since 2023 . He called and scheduled this appt today. Now he gets around in an electric WC, had a CVA of the right eye.   Can see with the right eye , but impaired. No recent LOC.   Now he claims , would like us to follow up with his DM.    Patient had a heart catheterization on 4/7/2025 secondary to chest pain which was nonobstructive subsequently seen  in the office because of ongoing intermittent atypical chest pain for over a month.  Does have a PCI of the RCA back in 2022, and also 23 had nonobstructive pattern the cardiac cath.  Unfortunately does have all the risk factors including diabetes and right above-the-knee amputation gets around in a wheelchair, with dyslipidemia etc. Jamie claims he is taking his medication as directed.  He also had endoscopy done by Dr. Hernandez on 4/16/2025 secondary to dysphagia no bleeding was noted.    Did not bring the dexcom today , he does check sugars regularly . Had gastric emptying study today, was ordered  By GI doc's office ,  He gets nausea and GERD symptoms .  Jamie gets around in an electric wheel chair. He does have old left AKA and does see dr. Boyd quarterly for his eye.      Current Outpatient Medications   Medication Sig Dispense Refill    insulin lispro (HUMALOG KWIKPEN) 200 UNIT/ML SOPN pen INJECT 35 UNITS PLUS SLIDING SCALE BEFORE MEALS. ONLY USE IF BLOOD GLUCOSE IS GREATER THAN 200. MAX DOSE: 160 UNITS PER DAY. 18 mL 5    metoclopramide (REGLAN) 10 MG tablet Take 1 tablet by mouth in the morning and at bedtime 60 tablet 0    senna (SENOKOT) 8.6 MG tablet Take 1 tablet by mouth nightly 30

## 2025-06-25 ENCOUNTER — APPOINTMENT (OUTPATIENT)
Dept: PHYSICAL THERAPY | Age: 55
End: 2025-06-25
Payer: MEDICARE

## 2025-07-02 DIAGNOSIS — Z99.2 TYPE 2 DIABETES MELLITUS WITH CHRONIC KIDNEY DISEASE ON CHRONIC DIALYSIS, WITH LONG-TERM CURRENT USE OF INSULIN (HCC): ICD-10-CM

## 2025-07-02 DIAGNOSIS — N18.6 TYPE 2 DIABETES MELLITUS WITH CHRONIC KIDNEY DISEASE ON CHRONIC DIALYSIS, WITH LONG-TERM CURRENT USE OF INSULIN (HCC): ICD-10-CM

## 2025-07-02 DIAGNOSIS — Z79.4 TYPE 2 DIABETES MELLITUS WITH CHRONIC KIDNEY DISEASE ON CHRONIC DIALYSIS, WITH LONG-TERM CURRENT USE OF INSULIN (HCC): ICD-10-CM

## 2025-07-02 DIAGNOSIS — E11.22 TYPE 2 DIABETES MELLITUS WITH CHRONIC KIDNEY DISEASE ON CHRONIC DIALYSIS, WITH LONG-TERM CURRENT USE OF INSULIN (HCC): ICD-10-CM

## 2025-07-02 RX ORDER — RANOLAZINE 500 MG/1
500 TABLET, EXTENDED RELEASE ORAL 2 TIMES DAILY
Qty: 180 TABLET | Refills: 1 | Status: SHIPPED | OUTPATIENT
Start: 2025-07-02

## 2025-07-02 RX ORDER — ISOSORBIDE MONONITRATE 60 MG/1
60 TABLET, EXTENDED RELEASE ORAL DAILY
Qty: 90 TABLET | Refills: 1 | Status: SHIPPED | OUTPATIENT
Start: 2025-07-02

## 2025-07-02 NOTE — TELEPHONE ENCOUNTER
Gabapentin last ordered 4/28, disp 90, refill 2    Last visit 6/24:     He does have DM with neuropathy, was on lyrica in the past by kayla Orellana, no longer seeing her. I checked [place]  And he was on gabapentin  800 mg , will start on 400 mg TID and added cymbalta 30 mg , and stopped zoloft.     Next visit 10/29

## 2025-07-04 RX ORDER — KETOROLAC TROMETHAMINE 30 MG/ML
INJECTION, SOLUTION INTRAMUSCULAR; INTRAVENOUS
Qty: 1 EACH | Refills: 0 | Status: SHIPPED | OUTPATIENT
Start: 2025-07-04

## 2025-07-04 RX ORDER — GABAPENTIN 400 MG/1
CAPSULE ORAL
Qty: 90 CAPSULE | Refills: 2 | Status: SHIPPED | OUTPATIENT
Start: 2025-07-04 | End: 2025-10-02

## 2025-07-08 ENCOUNTER — HOSPITAL ENCOUNTER (OUTPATIENT)
Dept: PHYSICAL THERAPY | Age: 55
Setting detail: THERAPIES SERIES
Discharge: HOME | End: 2025-07-08
Payer: MEDICARE

## 2025-07-08 PROCEDURE — 97110 THERAPEUTIC EXERCISES: CPT

## 2025-07-08 NOTE — PROGRESS NOTES
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  he has a past surgical history that includes other surgical history (Right, 1/14/15); Abscess Drainage (Right); Toe amputation (Right, 1/16/15); Foot Debridement (Right, 07/01/2016); Leg amputation below knee (Right, 07/20/2016); [place] tooth extraction (?when); pr i&d shoulder infected bursa (Left, 8/18/2017); pr office/outpt visit,procedure only (Right, 9/20/2018); incision and drainage (Right, 2/18/2019); Leg amputation below knee (Right, 4/4/2019); AMPUTATION ABOVE KNEE (Right, 4/18/2019); Upper gastrointestinal endoscopy (N/A, 3/3/2020); Cholecystectomy, laparoscopic (N/A, 4/1/2020); Endoscopy, colon, diagnostic; Cystoscopy (N/A, 7/20/2020); Cystoscopy (N/A, 8/21/2020); Gastrocnemius Recession (Left, 11/12/2021); Abdomen surgery; Colonoscopy; Dilatation, esophagus; Upper gastrointestinal endoscopy (N/A, 6/15/2022); Colonoscopy (N/A, 8/18/2023); Upper gastrointestinal endoscopy (N/A, 8/18/2023); Colonoscopy (N/A, 10/13/2023); Colonoscopy (N/A, 10/30/2023); Cardiac procedure (N/A, 4/7/2025); and Upper gastrointestinal endoscopy (N/A, 4/16/2025).     Allergies Allergies   Allergen Reactions    Actos [Pioglitazone] Swelling    Clindamycin/Lincomycin Itching    Vancomycin Hives      Rash, with erythematous plaques to abdomen, shoulders, and proximal upper extremities with pruritis, persisted with 2nd dose administered slower.      Metformin And Related Swelling      Medications   Current Outpatient Medications:     Continuous Glucose  (FREESTYLE MARIANELA 3 READER) OLGA ABDALLA TO READ BLOOD GLUCOSE 5 TIMES PER DAY, Disp: 1 each, Rfl: 0    gabapentin (NEURONTIN) 400 MG capsule, TAKE ONE CAPSULE BY MOUTH 3 TIMES A DAY, Disp: 90 capsule, Rfl: 2    isosorbide

## 2025-07-14 ENCOUNTER — APPOINTMENT (OUTPATIENT)
Dept: PHYSICAL THERAPY | Age: 55
End: 2025-07-14
Payer: MEDICARE

## 2025-07-16 ENCOUNTER — APPOINTMENT (OUTPATIENT)
Dept: PHYSICAL THERAPY | Age: 55
End: 2025-07-16
Payer: MEDICARE

## 2025-07-23 ENCOUNTER — APPOINTMENT (OUTPATIENT)
Dept: PHYSICAL THERAPY | Age: 55
End: 2025-07-23
Payer: MEDICARE

## 2025-07-28 ENCOUNTER — APPOINTMENT (OUTPATIENT)
Dept: PHYSICAL THERAPY | Age: 55
End: 2025-07-28
Payer: MEDICARE

## 2025-08-11 RX ORDER — FUROSEMIDE 20 MG/1
20 TABLET ORAL DAILY
Qty: 90 TABLET | Refills: 1 | Status: SHIPPED | OUTPATIENT
Start: 2025-08-11

## 2025-08-11 RX ORDER — DULOXETIN HYDROCHLORIDE 30 MG/1
30 CAPSULE, DELAYED RELEASE ORAL DAILY
Qty: 30 CAPSULE | Refills: 3 | Status: SHIPPED | OUTPATIENT
Start: 2025-08-11

## 2025-08-13 ENCOUNTER — OFFICE VISIT (OUTPATIENT)
Dept: ENT CLINIC | Age: 55
End: 2025-08-13
Payer: MEDICARE

## 2025-08-13 VITALS
BODY MASS INDEX: 35.35 KG/M2 | HEART RATE: 86 BPM | DIASTOLIC BLOOD PRESSURE: 62 MMHG | HEIGHT: 66 IN | OXYGEN SATURATION: 99 % | TEMPERATURE: 97.4 F | SYSTOLIC BLOOD PRESSURE: 128 MMHG | RESPIRATION RATE: 20 BRPM

## 2025-08-13 DIAGNOSIS — Z78.9 INTOLERANCE OF CONTINUOUS POSITIVE AIRWAY PRESSURE (CPAP) VENTILATION: ICD-10-CM

## 2025-08-13 DIAGNOSIS — G47.10 HYPERSOMNOLENCE: ICD-10-CM

## 2025-08-13 DIAGNOSIS — K21.9 GASTROESOPHAGEAL REFLUX DISEASE, UNSPECIFIED WHETHER ESOPHAGITIS PRESENT: ICD-10-CM

## 2025-08-13 DIAGNOSIS — J34.3 HYPERTROPHY OF POSTERIOR END OF INFERIOR TURBINATE: ICD-10-CM

## 2025-08-13 DIAGNOSIS — R13.14 PHARYNGOESOPHAGEAL DYSPHAGIA: ICD-10-CM

## 2025-08-13 DIAGNOSIS — J34.89 NASAL SEPTAL PERFORATION: ICD-10-CM

## 2025-08-13 DIAGNOSIS — G47.33 OBSTRUCTIVE SLEEP APNEA: Primary | ICD-10-CM

## 2025-08-13 DIAGNOSIS — E65 ABDOMINAL OBESITY: ICD-10-CM

## 2025-08-13 DIAGNOSIS — J34.8212: ICD-10-CM

## 2025-08-13 DIAGNOSIS — J34.2 NASAL SEPTAL DEVIATION: ICD-10-CM

## 2025-08-13 PROCEDURE — 99203 OFFICE O/P NEW LOW 30 MIN: CPT | Performed by: OTOLARYNGOLOGY

## 2025-08-13 PROCEDURE — 1036F TOBACCO NON-USER: CPT | Performed by: OTOLARYNGOLOGY

## 2025-08-13 PROCEDURE — G8427 DOCREV CUR MEDS BY ELIG CLIN: HCPCS | Performed by: OTOLARYNGOLOGY

## 2025-08-13 PROCEDURE — 3074F SYST BP LT 130 MM HG: CPT | Performed by: OTOLARYNGOLOGY

## 2025-08-13 PROCEDURE — 3078F DIAST BP <80 MM HG: CPT | Performed by: OTOLARYNGOLOGY

## 2025-08-13 PROCEDURE — 3017F COLORECTAL CA SCREEN DOC REV: CPT | Performed by: OTOLARYNGOLOGY

## 2025-08-13 PROCEDURE — G8417 CALC BMI ABV UP PARAM F/U: HCPCS | Performed by: OTOLARYNGOLOGY

## 2025-08-14 ENCOUNTER — OFFICE VISIT (OUTPATIENT)
Age: 55
End: 2025-08-14

## 2025-08-14 VITALS
SYSTOLIC BLOOD PRESSURE: 124 MMHG | OXYGEN SATURATION: 97 % | DIASTOLIC BLOOD PRESSURE: 74 MMHG | HEART RATE: 92 BPM | BODY MASS INDEX: 36.16 KG/M2 | HEIGHT: 66 IN | TEMPERATURE: 98.4 F | WEIGHT: 225 LBS

## 2025-08-14 DIAGNOSIS — G47.8 NON-RESTORATIVE SLEEP: ICD-10-CM

## 2025-08-14 DIAGNOSIS — S78.111A ABOVE KNEE AMPUTATION OF RIGHT LOWER EXTREMITY (HCC): ICD-10-CM

## 2025-08-14 DIAGNOSIS — F32.A ANXIETY AND DEPRESSION: ICD-10-CM

## 2025-08-14 DIAGNOSIS — F41.9 ANXIETY AND DEPRESSION: ICD-10-CM

## 2025-08-14 DIAGNOSIS — I10 ESSENTIAL HYPERTENSION: ICD-10-CM

## 2025-08-14 DIAGNOSIS — Z86.69 HISTORY OF OBSTRUCTIVE SLEEP APNEA: ICD-10-CM

## 2025-08-14 DIAGNOSIS — G47.33 OSA (OBSTRUCTIVE SLEEP APNEA): Primary | ICD-10-CM

## 2025-08-14 DIAGNOSIS — F31.81: ICD-10-CM

## 2025-08-14 PROBLEM — G47.10 HYPERSOMNOLENCE: Status: ACTIVE | Noted: 2025-08-14

## 2025-08-14 PROBLEM — R13.14 PHARYNGOESOPHAGEAL DYSPHAGIA: Status: ACTIVE | Noted: 2025-08-14

## 2025-08-14 PROBLEM — E65 ABDOMINAL OBESITY: Status: ACTIVE | Noted: 2025-08-14

## 2025-08-14 PROBLEM — H25.813 COMBINED FORMS OF AGE-RELATED CATARACT OF BOTH EYES: Status: ACTIVE | Noted: 2024-12-26

## 2025-08-14 PROBLEM — J34.3 HYPERTROPHY OF POSTERIOR END OF INFERIOR TURBINATE: Status: ACTIVE | Noted: 2025-08-14

## 2025-08-14 PROBLEM — J34.8212: Status: ACTIVE | Noted: 2025-08-14

## 2025-08-14 PROBLEM — J34.2 NASAL SEPTAL DEVIATION: Status: ACTIVE | Noted: 2025-08-14

## 2025-08-14 PROBLEM — K21.9 GASTROESOPHAGEAL REFLUX DISEASE: Status: ACTIVE | Noted: 2025-08-14

## 2025-08-14 PROBLEM — Z78.9 INTOLERANCE OF CONTINUOUS POSITIVE AIRWAY PRESSURE (CPAP) VENTILATION: Status: ACTIVE | Noted: 2025-08-14

## 2025-08-14 PROBLEM — H34.9 RETINAL ARTERY OCCLUSION: Status: ACTIVE | Noted: 2024-12-26

## 2025-08-14 PROBLEM — J34.89 NASAL SEPTAL PERFORATION: Status: ACTIVE | Noted: 2025-08-14

## 2025-08-28 ENCOUNTER — PATIENT MESSAGE (OUTPATIENT)
Dept: ENT CLINIC | Age: 55
End: 2025-08-28

## 2025-08-28 DIAGNOSIS — K21.9 GASTROESOPHAGEAL REFLUX DISEASE WITHOUT ESOPHAGITIS: ICD-10-CM

## 2025-08-28 RX ORDER — OMEPRAZOLE 40 MG/1
40 CAPSULE, DELAYED RELEASE ORAL 2 TIMES DAILY
Qty: 180 CAPSULE | Refills: 0 | Status: SHIPPED | OUTPATIENT
Start: 2025-08-28 | End: 2025-11-26

## 2025-08-28 RX ORDER — FAMOTIDINE 40 MG/1
40 TABLET, FILM COATED ORAL 2 TIMES DAILY
Qty: 180 TABLET | Refills: 0 | Status: SHIPPED | OUTPATIENT
Start: 2025-08-28 | End: 2025-11-26

## (undated) DEVICE — SYRINGE MED 10ML LUERLOCK TIP W/O SFTY DISP

## (undated) DEVICE — GLOVE ORANGE PI 7   MSG9070

## (undated) DEVICE — GLOVE SURG SZ 9 THK91MIL LTX FREE SYN POLYISOPRENE ANTI

## (undated) DEVICE — BLADE SAW W25XL90MM THK147MM S STL SAG HVY DUTY BVL EDGE NO

## (undated) DEVICE — Z DISCONTINUED BY MEDLINE USE 2711682 TRAY SKIN PREP DRY W/ PREM GLV

## (undated) DEVICE — SOLUTION SURG PREP POV IOD 7.5% 4 OZ

## (undated) DEVICE — SOLUTION SCRB 4OZ 4% CHG H2O AIDED FOR PREOPERATIVE SKIN

## (undated) DEVICE — CANNULA SEAL

## (undated) DEVICE — CONTAINER,SPECIMEN,PNEU TUBE,4OZ,OR STRL: Brand: MEDLINE

## (undated) DEVICE — SET IRRIG L94IN ID0.281IN W/ 4.5IN DST FLX CONN 2 LD ON OFF

## (undated) DEVICE — LINER SUCT CANSTR 1500CC SEMI RIG W/ POR HYDROPHOBIC SHUT

## (undated) DEVICE — SKIN AFFIX SURG ADHESIVE 72/CS 0.55ML: Brand: MEDLINE

## (undated) DEVICE — SOLUTION IV IRRIG POUR BRL 0.9% SODIUM CHL 2F7124

## (undated) DEVICE — GAUZE,SPONGE,8"X4",12PLY,XRAY,STRL,LF: Brand: MEDLINE

## (undated) DEVICE — COVER ARMBRD W13XL28.5IN IMPERV BLU FOR OP RM

## (undated) DEVICE — PREP SOL PVP IODINE 4%  4 OZ/BTL

## (undated) DEVICE — BASIC SINGLE BASIN BTC-LF: Brand: MEDLINE INDUSTRIES, INC.

## (undated) DEVICE — CATHETER DIAG 5FR L100CM LUMN ID0.047IN JR4 CRV 0 SIDE H

## (undated) DEVICE — CHLORAPREP 26ML ORANGE

## (undated) DEVICE — BASIC SINGLE BASIN 1-LF: Brand: MEDLINE INDUSTRIES, INC.

## (undated) DEVICE — ARM DRAPE

## (undated) DEVICE — BANDAGE COMPR E SGL LAYERED CLSR BGE W/ CLP W4INXL15FT

## (undated) DEVICE — GUIDEWIRE VASC L260CM DIA0.035IN RAD 3MM J TIP L7CM PTFE

## (undated) DEVICE — CATHETER DIAG 5FR L100CM LUMN ID0.047IN JL3.5 CRV 0 SIDE H

## (undated) DEVICE — GOWN,SIRUS,NON REINFRCD,LARGE,SET IN SL: Brand: MEDLINE

## (undated) DEVICE — Device: Brand: NOMOLINE™ LH ADULT NASAL CO2 CANNULA WITH O2 4M

## (undated) DEVICE — GENERAL LAPAROSCOPY PACK-LF: Brand: MEDLINE INDUSTRIES, INC.

## (undated) DEVICE — HYPODERMIC SAFETY NEEDLE: Brand: MAGELLAN

## (undated) DEVICE — 450 ML BOTTLE OF 0.05% CHLORHEXIDINE GLUCONATE IN 99.95% STERILE WATER FOR IRRIGATION, USP AND APPLICATOR.: Brand: IRRISEPT ANTIMICROBIAL WOUND LAVAGE

## (undated) DEVICE — 4-PORT MANIFOLD: Brand: NEPTUNE 2

## (undated) DEVICE — DRAPE,HIP,W/POUCHES,STERILE: Brand: MEDLINE

## (undated) DEVICE — TIBURON NEONATAL DRAPE: Brand: CONVERTORS

## (undated) DEVICE — VELCLOSE LATEX FREE ELASTIC BANDAGE D/L 6INX10YD

## (undated) DEVICE — SPONGE GZ W4XL4IN COT 12 PLY TYP VII WVN C FLD DSGN

## (undated) DEVICE — VERSAJET II HYDROSURGERY SYSTEM HANDSET, 45DEG 14MM EXACT: Brand: VERSAJET

## (undated) DEVICE — SPONGE LAP W18XL18IN WHT COT 4 PLY FLD STRUNG RADPQ DISP ST

## (undated) DEVICE — LINER GLOVEXL RED CUT RESIST STRL REPEL LT

## (undated) DEVICE — GLOVE SURG SZ 65 THK91MIL LTX FREE SYN POLYISOPRENE

## (undated) DEVICE — REDUCER: Brand: ENDOWRIST

## (undated) DEVICE — DRAPE KIT RAMAPR RADIATION SHIELD

## (undated) DEVICE — BANDAGE GZ W45INXL4 1 10YD FLUF RL 6 PLY DERMACEA

## (undated) DEVICE — DRAPE,EXTREMITY,89X128,STERILE: Brand: MEDLINE

## (undated) DEVICE — SUTURE ETHBND D SPEC NO 0 UR 6 30IN D9436

## (undated) DEVICE — BANDAGE,GAUZE,4.5"X4.1YD,STERILE,LF: Brand: MEDLINE

## (undated) DEVICE — DRESSING,GAUZE,XEROFORM,CURAD,5"X9",ST: Brand: CURAD

## (undated) DEVICE — TUBING, SUCTION, 1/4" X 20', STRAIGHT: Brand: MEDLINE INDUSTRIES, INC.

## (undated) DEVICE — INTRODUCER CATH L3.5IN DIA7.5FR FOR CHOLANGIOGRAPHY TAUT

## (undated) DEVICE — SUTURE VCRL SZ 0 L27IN ABSRB UD L36MM CP-1 1/2 CIR REV CUT J267H

## (undated) DEVICE — PACK PROCEDURE SURG ORTH BASIC SRHP LF

## (undated) DEVICE — GLOVE ORANGE PI 7 1/2   MSG9075

## (undated) DEVICE — PENCIL ES L3M BTTN SWCH HOLSTER W/ BLDE ELECTRD EDGE

## (undated) DEVICE — ABDOMINAL PAD: Brand: DERMACEA

## (undated) DEVICE — DUAL CUT SAGITTAL BLADE

## (undated) DEVICE — PAD,ABDOMINAL,5"X9",ST,LF,25/BX: Brand: MEDLINE INDUSTRIES, INC.

## (undated) DEVICE — ELECTROSURGICAL PENCIL BUTTON SWITCH E-Z CLEAN COATED BLADE ELECTRODE 10 FT (3 M) CORD HOLSTER: Brand: MEGADYNE

## (undated) DEVICE — BANDAGE COMPR W6INXL5YD SELF ADH COHESIVE CO FLX

## (undated) DEVICE — 3M™ COBAN™ NL STERILE NON-LATEX SELF-ADHERENT WRAP, 2084S, 4 IN X 5 YD (10 CM X 4,5 M), 18 ROLLS/CASE: Brand: 3M™ COBAN™

## (undated) DEVICE — GLOVE ORANGE PI 8 1/2   MSG9085

## (undated) DEVICE — Device

## (undated) DEVICE — SUTURE MCRYL SZ 2-0 L27IN ABSRB UD SH L26MM TAPERPOINT NDL Y417H

## (undated) DEVICE — YANKAUER,BULB TIP,W/O VENT,RIGID,STERILE: Brand: MEDLINE

## (undated) DEVICE — IMPREGNATED GAUZE DRESSING: Brand: CUTICERIN 7.5X7.5CM CTN 50

## (undated) DEVICE — TOWEL,OR,DSP,ST,BLUE,DLX,4/PK,20PK/CS: Brand: MEDLINE

## (undated) DEVICE — PATIENT RETURN ELECTRODE, SINGLE-USE, CONTACT QUALITY MONITORING, ADULT, WITH 9FT CORD, FOR PATIENTS WEIGING OVER 33LBS. (15KG): Brand: MEGADYNE

## (undated) DEVICE — INTENDED FOR TISSUE SEPARATION, AND OTHER PROCEDURES THAT REQUIRE A SHARP SURGICAL BLADE TO PUNCTURE OR CUT.: Brand: BARD-PARKER ® CARBON RIB-BACK BLADES

## (undated) DEVICE — TUBING INSUFFLATOR HEAT HUMIDIFIED SMK EVAC SET PNEUMOCLEAR

## (undated) DEVICE — ROYAL SILK SURGICAL GOWN, XXL: Brand: CONVERTORS

## (undated) DEVICE — SEAL

## (undated) DEVICE — SUTURE ETHLN SZ 3-0 L30IN NONABSORBABLE BLK L36MM FSLX 3/8 1673BH

## (undated) DEVICE — DILATOR ENDOSCP L180CM DIA6FR BLLN L8CM DIA54-60FR

## (undated) DEVICE — DRAPE,U/SHT,SPLIT,FILM,60X84,STERILE: Brand: MEDLINE

## (undated) DEVICE — GLIDESHEATH SLENDER ACCESS KIT: Brand: GLIDESHEATH SLENDER

## (undated) DEVICE — STOCKINETTE,IMPERVIOUS,12X48,STERILE: Brand: MEDLINE

## (undated) DEVICE — Z DUP USE 2257490 ADHESIVE SKIN CLSRE 036ML TPCL 2CTL CNCRLTE HIGH VSCSTY DRMB

## (undated) DEVICE — TETRA-FLEX CF WOVEN LATEX FREE ELASTIC BANDAGE 6" X 5.5 YD: Brand: TETRA-FLEX™CF

## (undated) DEVICE — BLADE SAW  6 13X90X1.27MM

## (undated) DEVICE — DUP USE 304928 DRESSING GZ 12 PLY 4X4 STRL

## (undated) DEVICE — SYRINGE IRRIG 60ML SFT PLIABLE BLB EZ TO GRP 1 HND USE W/

## (undated) DEVICE — GOWN,SIRUS,NONRNF,SETINSLV,XL,20/CS: Brand: MEDLINE

## (undated) DEVICE — CONVERTED USE 338908 SPONGES LAP 18X18 ST

## (undated) DEVICE — BAND COMPR L24CM REG CLR PLAS HEMSTAT EXT HK AND LOOP RETEN

## (undated) DEVICE — CATHETER CHOLGM 4.5FR L18IN TIP 5.5FR W/ MTL SUPP TB TAUT

## (undated) DEVICE — OFF - ST. RITAS VASC: Brand: MEDLINE INDUSTRIES, INC.

## (undated) DEVICE — SUTURE VCRL SZ 2-0 L27IN ABSRB UD L36MM CP-1 1/2 CIR REV J266H

## (undated) DEVICE — PACK PROCEDURE SURG SET UP SRMC

## (undated) DEVICE — BANDAGE COMPR W4INXL55YD 1 LAYR COT CLSR DLX E HVY DUTY W

## (undated) DEVICE — SOLUTION IV 1000ML 0.9% SOD CHL PH 5 INJ USP VIAFLX PLAS

## (undated) DEVICE — COLUMN DRAPE

## (undated) DEVICE — SUTURE ETHLN SZ 2-0 L30IN NONABSORBABLE BLK L36MM FSLX 3/8 1674H

## (undated) DEVICE — TRAY PREP DRY W/ PREM GLV 2 APPL 6 SPNG 2 UNDPD 1 OVERWRAP

## (undated) DEVICE — ELECTRO LUBE IS A SINGLE PATIENT USE DEVICE THAT IS INTENDED TO BE USED ON ELECTROSURGICAL ELECTRODES TO REDUCE STICKING.: Brand: KEY SURGICAL ELECTRO LUBE

## (undated) DEVICE — BLADELESS OBTURATOR: Brand: WECK VISTA

## (undated) DEVICE — GLOVE ORANGE PI 8   MSG9080

## (undated) DEVICE — BLANKET WRM W29.9XL79.1IN UP BODY FORC AIR MISTRAL-AIR

## (undated) DEVICE — SOLUTION ANTIFOG VIS SYS CLEARIFY LAPSCP

## (undated) DEVICE — GLOVE SURG SZ 6 THK91MIL LTX FREE SYN POLYISOPRENE ANTI

## (undated) DEVICE — TIP COVER ACCESSORY

## (undated) DEVICE — GAMMEX® NON-LATEX SIZE 7.5, STERILE NEOPRENE POWDER-FREE SURGICAL GLOVE: Brand: GAMMEX